# Patient Record
Sex: FEMALE | Race: AMERICAN INDIAN OR ALASKA NATIVE | NOT HISPANIC OR LATINO | Employment: OTHER | URBAN - METROPOLITAN AREA
[De-identification: names, ages, dates, MRNs, and addresses within clinical notes are randomized per-mention and may not be internally consistent; named-entity substitution may affect disease eponyms.]

---

## 2017-01-06 ENCOUNTER — TELEPHONE (OUTPATIENT)
Dept: ENDOSCOPY | Facility: HOSPITAL | Age: 67
End: 2017-01-06

## 2017-01-06 ENCOUNTER — OFFICE VISIT (OUTPATIENT)
Dept: GASTROENTEROLOGY | Facility: CLINIC | Age: 67
End: 2017-01-06
Payer: MEDICARE

## 2017-01-06 VITALS
WEIGHT: 149.94 LBS | BODY MASS INDEX: 24.1 KG/M2 | HEIGHT: 66 IN | HEART RATE: 75 BPM | DIASTOLIC BLOOD PRESSURE: 68 MMHG | SYSTOLIC BLOOD PRESSURE: 129 MMHG

## 2017-01-06 DIAGNOSIS — R13.10 DYSPHAGIA, UNSPECIFIED TYPE: Primary | ICD-10-CM

## 2017-01-06 DIAGNOSIS — K21.9 GASTROESOPHAGEAL REFLUX DISEASE WITHOUT ESOPHAGITIS: ICD-10-CM

## 2017-01-06 DIAGNOSIS — Z51.81 ENCOUNTER FOR MONITORING LONG-TERM PROTON PUMP INHIBITOR THERAPY: ICD-10-CM

## 2017-01-06 DIAGNOSIS — Z79.899 ENCOUNTER FOR MONITORING LONG-TERM PROTON PUMP INHIBITOR THERAPY: ICD-10-CM

## 2017-01-06 PROCEDURE — 99999 PR PBB SHADOW E&M-EST. PATIENT-LVL IV: CPT | Mod: PBBFAC,,, | Performed by: NURSE PRACTITIONER

## 2017-01-06 PROCEDURE — 99214 OFFICE O/P EST MOD 30 MIN: CPT | Mod: S$PBB,,, | Performed by: NURSE PRACTITIONER

## 2017-01-06 PROCEDURE — 99214 OFFICE O/P EST MOD 30 MIN: CPT | Mod: PBBFAC | Performed by: NURSE PRACTITIONER

## 2017-01-06 NOTE — MR AVS SNAPSHOT
[facility] - Gastroenterology  [address]  [place] [address]  Phone: [phone]  Fax: [phone]                  Yamel Shah   2017 3:30 PM   Office Visit    Description:  Female : 1950   Provider:  Jeannette Pringle NP   Department:  [facility]           Reason for Visit     Gastroesophageal Reflux                To Do List           Future Appointments        Provider Department Dept Phone    2017 10:00 AM LAB, APPOINTMENT [place] [facility] [phone]    2017 11:00 AM Joshua Adames DO [facility] Rheumatology [phone]      Goals (5 Years of Data)     None      [facility]     [facility] -  Assistance  Registered nurses in the [facility] provide clinical advisement, health education, appointment booking, and other advisory services.  Call for this free service at [phone].             Medications           Message regarding Medications     Verify the changes and/or additions to your medication regime listed below are the same as discussed with your clinician today.  If any of these changes or additions are incorrect, please notify your healthcare provider.        STOP taking these medications     tramadol (ULTRAM) 50 mg tablet Take 1 tablet (50 mg total) by mouth every 6 (six) hours as needed for Pain.    FLUVIRIN [id number] 45 mcg (15 mcg x 3)/0.5 mL Susp            Verify that the below list of medications is an accurate representation of the medications you are currently taking.  If none reported, the list may be blank. If incorrect, please contact your healthcare provider. Carry this list with you in case of emergency.           Current Medications     ADCIRCA 20 mg Tab TAKE TWO TABLETS (40 MG) BY MOUTH ONCE DAILY. CALL [phone] FOR REFILLS.    albuterol 90 mcg/actuation inhaler Inhale 2 puffs into the lungs every 6 (six) hours as needed for Wheezing.     "gabapentin (NEURONTIN) 300 MG capsule TAKE 1 CAPSULE BY MOUTH THREE TIMES DAILY    hydrocodone-acetaminophen 5-325mg (NORCO) 5-325 mg per tablet Take 1 tablet by mouth 2 (two) times daily as needed for Pain.    meloxicam (MOBIC) 7.5 MG tablet TAKE 1 TABLET BY MOUTH TWICE DAILY WITH FOOD    multivitamin capsule Take 1 capsule by mouth once daily.     nifedipine (ADALAT CC) 90 MG TbSR Take 1 tablet (90 mg total) by mouth once daily.    omeprazole (PRILOSEC) 40 MG capsule TAKE ONE CAPSULE BY MOUTH EVERY MORNING    PATADAY 0.2 % Drop 1 drop once daily.     pravastatin (PRAVACHOL) 20 MG tablet TAKE 1 TABLET BY MOUTH EVERY EVENING    PREVIDENT 5000 BOOSTER PLUS 1.1 % Pste     ranitidine (ZANTAC) 150 MG tablet Take 1 tablet (150 mg total) by mouth nightly.    VITAMIN D2 50,000 unit capsule TAKE ONE CAPSULE BY MOUTH EVERY 7 DAYS    alprazolam (XANAX) 0.5 MG tablet Take 1 tablet (0.5 mg total) by mouth once as needed for Anxiety. Take one 0.5 mg tablet of xanax 1h before the EVLT procedure.  You may take a second tablet right before the procedure; please check with our nurse.    desonide (DESOWEN) 0.05 % cream Apply topically 2 (two) times daily.    ferrous gluconate (FERGON) 325 MG Tab Take 1 tablet (325 mg total) by mouth daily with breakfast.    mupirocin (BACTROBAN) 2 % ointment Apply topically 3 (three) times daily.           Clinical Reference Information           Vital Signs - Last Recorded  Most recent update: 1/6/2017  3:26 PM by Jessica Fabian MA    BP Pulse Ht Wt BMI    129/68 75 5' 6" (1.676 m) 68 kg (149 lb 14.6 oz) 24.2 kg/m2      Blood Pressure          Most Recent Value    BP  129/68      Allergies as of 1/6/2017     Sulfa (Sulfonamide Antibiotics)      Immunizations Administered on Date of Encounter - 1/6/2017     None      "

## 2017-01-06 NOTE — PATIENT INSTRUCTIONS
To aid in effective swallowing:  Take small bites.  Cut foods into tiny pieces before chewing.  Chew foods thoroughly  Wash (drink small amounts of liquids) with liquids between bites.  Take your time while eating.  Do not talk while eating.  Avoid trigger foods.

## 2017-01-13 ENCOUNTER — TELEPHONE (OUTPATIENT)
Dept: RHEUMATOLOGY | Facility: CLINIC | Age: 67
End: 2017-01-13

## 2017-01-13 ENCOUNTER — LAB VISIT (OUTPATIENT)
Dept: LAB | Facility: HOSPITAL | Age: 67
End: 2017-01-13
Attending: INTERNAL MEDICINE
Payer: MEDICARE

## 2017-01-13 DIAGNOSIS — I27.29 PULMONARY HYPERTENSION ASSOCIATED WITH SYSTEMIC DISORDER: Chronic | ICD-10-CM

## 2017-01-13 DIAGNOSIS — R77.8 ELEVATED TOTAL PROTEIN: Primary | ICD-10-CM

## 2017-01-13 DIAGNOSIS — Z51.81 ENCOUNTER FOR MONITORING LONG-TERM PROTON PUMP INHIBITOR THERAPY: ICD-10-CM

## 2017-01-13 DIAGNOSIS — M19.079 OSTEOARTHRITIS OF ANKLE AND FOOT, UNSPECIFIED LATERALITY: ICD-10-CM

## 2017-01-13 DIAGNOSIS — Z79.899 ENCOUNTER FOR MONITORING LONG-TERM PROTON PUMP INHIBITOR THERAPY: ICD-10-CM

## 2017-01-13 LAB
ALBUMIN SERPL BCP-MCNC: 3.5 G/DL
ALBUMIN SERPL BCP-MCNC: 3.5 G/DL
ALP SERPL-CCNC: 114 U/L
ALP SERPL-CCNC: 114 U/L
ALT SERPL W/O P-5'-P-CCNC: 7 U/L
ALT SERPL W/O P-5'-P-CCNC: 7 U/L
ANION GAP SERPL CALC-SCNC: 9 MMOL/L
ANION GAP SERPL CALC-SCNC: 9 MMOL/L
AST SERPL-CCNC: 17 U/L
AST SERPL-CCNC: 17 U/L
BILIRUB SERPL-MCNC: 0.3 MG/DL
BILIRUB SERPL-MCNC: 0.3 MG/DL
BUN SERPL-MCNC: 16 MG/DL
BUN SERPL-MCNC: 16 MG/DL
CALCIUM SERPL-MCNC: 9.2 MG/DL
CALCIUM SERPL-MCNC: 9.2 MG/DL
CHLORIDE SERPL-SCNC: 106 MMOL/L
CHLORIDE SERPL-SCNC: 106 MMOL/L
CO2 SERPL-SCNC: 25 MMOL/L
CO2 SERPL-SCNC: 25 MMOL/L
CREAT SERPL-MCNC: 0.8 MG/DL
CREAT SERPL-MCNC: 0.8 MG/DL
CRP SERPL-MCNC: 5.2 MG/L
ERYTHROCYTE [SEDIMENTATION RATE] IN BLOOD BY WESTERGREN METHOD: 23 MM/HR
EST. GFR  (AFRICAN AMERICAN): >60 ML/MIN/1.73 M^2
EST. GFR  (AFRICAN AMERICAN): >60 ML/MIN/1.73 M^2
EST. GFR  (NON AFRICAN AMERICAN): >60 ML/MIN/1.73 M^2
EST. GFR  (NON AFRICAN AMERICAN): >60 ML/MIN/1.73 M^2
GLUCOSE SERPL-MCNC: 92 MG/DL
GLUCOSE SERPL-MCNC: 92 MG/DL
IGA SERPL-MCNC: 487 MG/DL
IGG SERPL-MCNC: 2625 MG/DL
IGM SERPL-MCNC: 121 MG/DL
POTASSIUM SERPL-SCNC: 3.8 MMOL/L
POTASSIUM SERPL-SCNC: 3.8 MMOL/L
PROT SERPL-MCNC: 8.9 G/DL
PROT SERPL-MCNC: 8.9 G/DL
SODIUM SERPL-SCNC: 140 MMOL/L
SODIUM SERPL-SCNC: 140 MMOL/L
VIT B12 SERPL-MCNC: 522 PG/ML

## 2017-01-13 PROCEDURE — 86334 IMMUNOFIX E-PHORESIS SERUM: CPT

## 2017-01-13 PROCEDURE — 86140 C-REACTIVE PROTEIN: CPT

## 2017-01-13 PROCEDURE — 82607 VITAMIN B-12: CPT

## 2017-01-13 PROCEDURE — 85651 RBC SED RATE NONAUTOMATED: CPT

## 2017-01-13 PROCEDURE — 84165 PROTEIN E-PHORESIS SERUM: CPT

## 2017-01-13 PROCEDURE — 84165 PROTEIN E-PHORESIS SERUM: CPT | Mod: 26,,, | Performed by: PATHOLOGY

## 2017-01-13 PROCEDURE — 80053 COMPREHEN METABOLIC PANEL: CPT

## 2017-01-13 PROCEDURE — 86334 IMMUNOFIX E-PHORESIS SERUM: CPT | Mod: 26,,, | Performed by: PATHOLOGY

## 2017-01-13 PROCEDURE — 36415 COLL VENOUS BLD VENIPUNCTURE: CPT

## 2017-01-13 PROCEDURE — 82784 ASSAY IGA/IGD/IGG/IGM EACH: CPT | Mod: 59

## 2017-01-16 LAB
ALBUMIN SERPL ELPH-MCNC: 3.78 G/DL
ALPHA1 GLOB SERPL ELPH-MCNC: 0.3 G/DL
ALPHA2 GLOB SERPL ELPH-MCNC: 0.71 G/DL
B-GLOBULIN SERPL ELPH-MCNC: 1.14 G/DL
GAMMA GLOB SERPL ELPH-MCNC: 2.57 G/DL
INTERPRETATION SERPL IFE-IMP: NORMAL
PATHOLOGIST INTERPRETATION IFE: NORMAL
PATHOLOGIST INTERPRETATION SPE: NORMAL
PROT SERPL-MCNC: 8.5 G/DL

## 2017-01-17 ENCOUNTER — HOSPITAL ENCOUNTER (OUTPATIENT)
Dept: RADIOLOGY | Facility: HOSPITAL | Age: 67
Discharge: HOME | End: 2017-01-17
Attending: INTERNAL MEDICINE
Payer: MEDICARE

## 2017-01-17 ENCOUNTER — OFFICE VISIT (OUTPATIENT)
Dept: RHEUMATOLOGY | Facility: CLINIC | Age: 67
End: 2017-01-17
Payer: MEDICARE

## 2017-01-17 VITALS
HEART RATE: 72 BPM | SYSTOLIC BLOOD PRESSURE: 110 MMHG | BODY MASS INDEX: 25.16 KG/M2 | WEIGHT: 147.38 LBS | HEIGHT: 64 IN | DIASTOLIC BLOOD PRESSURE: 55 MMHG

## 2017-01-17 DIAGNOSIS — K21.9 GASTROESOPHAGEAL REFLUX DISEASE, ESOPHAGITIS PRESENCE NOT SPECIFIED: ICD-10-CM

## 2017-01-17 DIAGNOSIS — I78.1 TELANGIECTASIA: ICD-10-CM

## 2017-01-17 DIAGNOSIS — I87.2 VENOUS INSUFFICIENCY OF BOTH LOWER EXTREMITIES: ICD-10-CM

## 2017-01-17 DIAGNOSIS — E55.9 VITAMIN D DEFICIENCY: ICD-10-CM

## 2017-01-17 DIAGNOSIS — J84.9 ILD (INTERSTITIAL LUNG DISEASE): ICD-10-CM

## 2017-01-17 DIAGNOSIS — L97.509 ULCER OF OTHER PART OF FOOT: ICD-10-CM

## 2017-01-17 DIAGNOSIS — M34.9 SCLERODERMA: ICD-10-CM

## 2017-01-17 DIAGNOSIS — M34.9 SCLERODERMA: Primary | ICD-10-CM

## 2017-01-17 PROCEDURE — 99999 PR PBB SHADOW E&M-EST. PATIENT-LVL IV: CPT | Mod: PBBFAC,GC,, | Performed by: INTERNAL MEDICINE

## 2017-01-17 PROCEDURE — 73130 X-RAY EXAM OF HAND: CPT | Mod: TC,RT

## 2017-01-17 PROCEDURE — 73130 X-RAY EXAM OF HAND: CPT | Mod: 26,RT,, | Performed by: RADIOLOGY

## 2017-01-17 PROCEDURE — 99214 OFFICE O/P EST MOD 30 MIN: CPT | Mod: PBBFAC,25 | Performed by: INTERNAL MEDICINE

## 2017-01-17 PROCEDURE — 99214 OFFICE O/P EST MOD 30 MIN: CPT | Mod: S$PBB,GC,, | Performed by: INTERNAL MEDICINE

## 2017-01-17 ASSESSMENT — ROUTINE ASSESSMENT OF PATIENT INDEX DATA (RAPID3)
AM STIFFNESS SCORE: 0, NO
MDHAQ FUNCTION SCORE: .9
PAIN SCORE: 3
PATIENT GLOBAL ASSESSMENT SCORE: 3
FATIGUE SCORE: 3
TOTAL RAPID3 SCORE: 3
PSYCHOLOGICAL DISTRESS SCORE: 3.3

## 2017-01-17 NOTE — PROGRESS NOTES
Subjective:       Patient ID: Yamel Shah is a 66 y.o. female.    Chief Complaint: No chief complaint on file.    S:  67 yo F for follow-up for scleroderma, diagnosed 1983- She has been seeing Dr. Ahuja for over 10 years. She is +SCL-70, -RNAP3. She has stable ILD on imaging 9/2015 and had mild exercise induced pulmonary hypertension in 2013 that has resolved on 6/2015 RHC. PFTs 4/2016 show normal DLCO, RV, FEV1. Mild restriction.    She has problems with digital ulcerations and required admission 6/2015 for epoprostenol. She is also on adcirca and nifedipine 90mg daily. She did not qualify for Bosentan. We last saw her lat September and she had worsening of they ulcers on he fit and we sent in for admission for epoprostenol. Her ulcers have improved since and healing but not totally resolved yet. She also saw vascular who will consider EVLT once ulcers have healed.      Her skin and breathing are unchanged.     Labs from 9/22 show worsening microcytic anemia with Hb of 9.5. She has some occasional trouble with food getting stuck in her chest, but denies hematemesis, hemoptysis, dark or bloody stools. She has GERD on EGD from 2014, but denies abdominal pain though she does take prilosec daily and zantac prn. She is scheduled for an EGD 1/26/17    She injured her hand recently and concerned she may have fractured her R 5th digit.     ROS:  Fever: no  Dry eyes no  Red eyes yes  Dry mouth:no  Mouth ulcers:no  Swollen glands:no  Headache:no  Abnormal hair loss:no  Chest pain: no  SOB: no  Difficulty swallowing:no  Cough: no  Heartburn: yes  Constipation:no  Diarrhea:no  Genital ulcers:no  Burning on urination: no  Skin rash: no  Color changes fingers:no  Burning or tingling:no  Bruising:no    Current Outpatient Prescriptions on File Prior to Visit   Medication Sig Dispense Refill    ADCIRCA 20 mg Tab TAKE TWO TABLETS (40 MG) BY MOUTH ONCE DAILY. CALL [phone] FOR REFILLS. 180 tablet 2    multivitamin  capsule Take 1 capsule by mouth once daily.       nifedipine (ADALAT CC) 90 MG TbSR Take 1 tablet (90 mg total) by mouth once daily. 90 tablet 3    omeprazole (PRILOSEC) 40 MG capsule TAKE ONE CAPSULE BY MOUTH EVERY MORNING 90 capsule 3    PATADAY 0.2 % Drop 1 drop once daily.   30    pravastatin (PRAVACHOL) 20 MG tablet TAKE 1 TABLET BY MOUTH EVERY EVENING 90 tablet 3    PREVIDENT 5000 BOOSTER PLUS 1.1 % Pste       VITAMIN D2 50,000 unit capsule TAKE ONE CAPSULE BY MOUTH EVERY 7 DAYS 12 capsule 0    [DISCONTINUED] albuterol 90 mcg/actuation inhaler Inhale 2 puffs into the lungs every 6 (six) hours as needed for Wheezing. 18 g 0    desonide (DESOWEN) 0.05 % cream Apply topically 2 (two) times daily. 1 Tube 0    mupirocin (BACTROBAN) 2 % ointment Apply topically 3 (three) times daily. 30 g 1    [DISCONTINUED] alprazolam (XANAX) 0.5 MG tablet Take 1 tablet (0.5 mg total) by mouth once as needed for Anxiety. Take one 0.5 mg tablet of xanax 1h before the EVLT procedure.  You may take a second tablet right before the procedure; please check with our nurse. 2 tablet 0    [DISCONTINUED] ferrous gluconate (FERGON) 325 MG Tab Take 1 tablet (325 mg total) by mouth daily with breakfast. 90 tablet 0    [DISCONTINUED] gabapentin (NEURONTIN) 300 MG capsule TAKE 1 CAPSULE BY MOUTH THREE TIMES DAILY 270 capsule 0    [DISCONTINUED] hydrocodone-acetaminophen 5-325mg (NORCO) 5-325 mg per tablet Take 1 tablet by mouth 2 (two) times daily as needed for Pain. 60 tablet 0    [DISCONTINUED] meloxicam (MOBIC) 7.5 MG tablet TAKE 1 TABLET BY MOUTH TWICE DAILY WITH FOOD 180 tablet 0    [DISCONTINUED] ranitidine (ZANTAC) 150 MG tablet Take 1 tablet (150 mg total) by mouth nightly. 90 tablet 3     No current facility-administered medications on file prior to visit.        Objective:     Vitals:    01/17/17 1126   BP: (!) 110/55   Pulse: 72     Constitutional: She is oriented to person, place, and time.   HENT:   Head:  Normocephalic and atraumatic.   Mouth/Throat: No oropharyngeal exudate.  + mauskopf face  Eyes: Conjunctivae and EOM are normal. Pupils are equal, round, and reactive to light. Right eye exhibits no discharge. Left eye exhibits no discharge. No scleral icterus.   Neck: Normal range of motion. No tracheal deviation present. No thyromegaly present.   Cardiovascular: Normal rate and regular rhythm.   Pulmonary/Chest: She has She has rales and mild wheezing at bases.   Abdominal: Soft. Bowel sounds are normal. She exhibits no distension and no mass. There is no tenderness. There is no rebound and no guarding.   Neurological: She is oriented to person, place, and time. She displays normal reflexes. Coordination normal.   Skin: Skin is warm. 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 She refuses to unwrap them.  Vasc: no edema    MRS -  28-->33-->19--> 28--> 35--> 28--->28 -->21--> 14.5--<18 today not including the feet.     LABS:  +anti-scleroderma Ab (topoisomerase Ab)  -RNA Polymerase III  +YG  +SSA  Low complement C4         STUDIES:  RHC 6/2015  Exercise:  AOSAT: 97  FICKCI: 3.42  FICKCO: 6.06  PAPRES: 35/12 (21)  PASAT: 73  PVR: 1.98  PWPRES: 12/12 (9)  RAPRES: 11/9 (5)  RVPRES: 31/1, 6    C. SUMMARY/POST-OPERATIVE DIAGNOSIS:    1. RHC demonstrates normal right and left-sided filling pressures.   2. Normal Cardiac output / index.   3. Normal pulmonary pressures.   4. No evidence of exercise induced pulmonary hypertension .    Right heart cath 1/2013  PAPRES: 29/9 (16)  PA:43/15 (26)  1. Normal PAP at rest  2. Normal right and left-sided filling pressures.   3. Normal Cardiac output / index.   4. Mild Exercise induced pulmonary hypertension with a significant decrease in CO/Ci with exercise. Consider ischemic evaluation if pt has not had one recently.    Echo:  7/2016: PPAP  30 (28)    PFTs  4/2016  FVC 70%  RV 89%  DLCO 84%  Mild restriction     9/2015  fvc 66%  TLC 66%  DLCO 71%  6 minute walk not done due to foot ulcerations    4/4/14- EGD  Findings:  One 7 mm polyp with no bleeding was found at the gastroesophageal   junction. This was biopsied with a cold forceps for histology.   Estimated blood loss was minimal.  The Z-line was irregular.  The entire examined stomach was normal.  The examined duodenum was normal.    Path EGD 4/2014  #1 GASTRIC TYPE MUCOSA WITH MILD ACUTE AND CHRONIC INFLAMMATION, AND HYPERPLASTIC  CHANGE.  NO INTESTINAL METAPLASIA OR DYSPLASIA IDENTIFIED.    HRCT 9/2015  Stable-appearing chronic interstitial lung disease consistent with patient's history of scleroderma. No acute abnormality is identified. Given the long-term stability of findings dating back to 2007, follow-up as clinically warranted.    Assessment:       1. Scleroderma (GERD, +telangiectasias, ILD, exercise induced pHTN (resolved 6/2015 RHC), Raynaud's, sclerodactyly with claw hand) Skin score up and down over the last 2 years (range 19-33). Skin, pulmonary all stable today. Foot ulcers improving. She self manages her dressings.     2. Exercise induced Pulmonary hypertension RHC 1/13- resolved on RHC 6/2015. Doesn't qualify for Bosentan. She is on Adcirca 40mg daily.      3. ILD (interstitial lung disease) - PFTS 4/2016 with normal DLCO, FEV1 and RV. 9/2015 HRCT stable ILD from 2007.    4. GERD (gastroesophageal reflux disease) - on PPI once daily with persistent symptoms. Had gastric polyp on EGD 2014, scheduled for EGD 1/26/17   5. Vitamin D insufficiency- on vitamin D weekly   6. Claw hand, unspecified laterality    7.  Osteopenia             Plan:   Continue Nifedipine SR 90mg daily, Continue statin  Continue ASA 81mg daily  Continue daily PPI and start evening zantac prn  Continue adcirca 40mg daily    Follow up with vascular surgery once ulcers heal    Repeat DXA, PFTs     Immunizations  UTD    Check Xray R hand    RTC in 3 months    Joshua Adames DO  Rheumatology fellow  PGY5

## 2017-01-17 NOTE — MR AVS SNAPSHOT
[facility] Rheumatology  [address]  [place] [address]  Phone: [phone]  Fax: [phone]                  Yamel Shah   2017 11:00 AM   Office Visit    Description:  Female : 1950   Provider:  Joshua Adames DO   Department:  [facility] - Rheumatology           Diagnoses this Visit        Comments    Scleroderma    -  Primary     Vitamin D deficiency         Gastroesophageal reflux disease, esophagitis presence not specified         Ulcer of other part of foot         Telangiectasia         Venous insufficiency of both lower extremities         Secondary pulmonary hypertension         ILD (interstitial lung disease)                To Do List           Future Appointments        Provider Department Dept Phone    2017 12:15 PM LAB, SAME DAY [facility] [phone]    2017 12:45 PM [facility] [id number] [address] LIMIT [facility] [phone]    2017 1:00 PM PULMONARY FUNCTION [facility] Pulmonary Lab [phone]    2017 1:15 PM PULMONARY FUNCTION [facility] - Pulmonary Lab [phone]    2017 1:30 PM PULMONARY FUNCTION [facility] Pulmonary Lab [phone]      Your Future Surgeries/Procedures     2017   Surgery with Annamaria Mendoza MD   [facility]    [address] [address] [phone]              Goals (5 Years of Data)     None      Follow-Up and Disposition     Return in about 3 months (around 2017).      [facility]     [facility] Care [facility] -  Assistance  Registered nurses in the [facility] provide clinical advisement, health education, appointment booking, and other advisory services.  Call for this free service at [phone].             Medications           Message regarding Medications     Verify the changes and/or additions to your medication regime listed below are the same as discussed  with your clinician today.  If any of these changes or additions are incorrect, please notify your healthcare provider.        STOP taking these medications     albuterol 90 mcg/actuation inhaler Inhale 2 puffs into the lungs every 6 (six) hours as needed for Wheezing.    alprazolam (XANAX) 0.5 MG tablet Take 1 tablet (0.5 mg total) by mouth once as needed for Anxiety. Take one 0.5 mg tablet of xanax 1h before the EVLT procedure.  You may take a second tablet right before the procedure; please check with our nurse.    ferrous gluconate (FERGON) 325 MG Tab Take 1 tablet (325 mg total) by mouth daily with breakfast.    gabapentin (NEURONTIN) 300 MG capsule TAKE 1 CAPSULE BY MOUTH THREE TIMES DAILY    hydrocodone-acetaminophen 5-325mg (NORCO) 5-325 mg per tablet Take 1 tablet by mouth 2 (two) times daily as needed for Pain.    meloxicam (MOBIC) 7.5 MG tablet TAKE 1 TABLET BY MOUTH TWICE DAILY WITH FOOD    ranitidine (ZANTAC) 150 MG tablet Take 1 tablet (150 mg total) by mouth nightly.           Verify that the below list of medications is an accurate representation of the medications you are currently taking.  If none reported, the list may be blank. If incorrect, please contact your healthcare provider. Carry this list with you in case of emergency.           Current Medications     ADCIRCA 20 mg Tab TAKE TWO TABLETS (40 MG) BY MOUTH ONCE DAILY. CALL [phone] FOR REFILLS.    multivitamin capsule Take 1 capsule by mouth once daily.     nifedipine (ADALAT CC) 90 MG TbSR Take 1 tablet (90 mg total) by mouth once daily.    omeprazole (PRILOSEC) 40 MG capsule TAKE ONE CAPSULE BY MOUTH EVERY MORNING    PATADAY 0.2 % Drop 1 drop once daily.     pravastatin (PRAVACHOL) 20 MG tablet TAKE 1 TABLET BY MOUTH EVERY EVENING    PREVIDENT 5000 BOOSTER PLUS 1.1 % Pste     ranitidine (ZANTAC) 150 MG capsule Take 150 mg by mouth 2 (two) times daily.    VITAMIN D2 50,000 unit capsule TAKE ONE CAPSULE BY MOUTH EVERY 7 DAYS     "desonide (DESOWEN) 0.05 % cream Apply topically 2 (two) times daily.    mupirocin (BACTROBAN) 2 % ointment Apply topically 3 (three) times daily.           Clinical Reference Information           Vital Signs - Last Recorded  Most recent update: 1/17/2017 11:29 AM by Mirian Cabrera MA    BP Pulse Ht Wt BMI    (!) 110/55 72 5' 3.6" (1.615 m) 66.9 kg (147 lb 6.4 oz) 25.62 kg/m2      Blood Pressure          Most Recent Value    BP  (!)  110/55      Allergies as of 1/17/2017     Sulfa (Sulfonamide Antibiotics)      Immunizations Administered on Date of Encounter - 1/17/2017     None      Orders Placed During Today's Visit     Future Labs/Procedures Expected by Expires    DXA Bone Density Spine And Hip_Axial Skeleton  1/17/2017 1/18/2018    X-Ray Hand Complete Right  1/17/2017 1/17/2018    Recurring Lab Work Interval Expires    C-reactive protein   7/17/2017    CBC auto differential   7/17/2017    Comprehensive metabolic panel   7/17/2017    Sedimentation rate, manual   7/17/2017      "

## 2017-01-17 NOTE — PROGRESS NOTES
I  Have personally take the history and examined the patient and agree with fellow's note as stated above. Would also make another attempt to obtain bosentan which is indicated for recurrent/ new digital ulcers. It is approved in [place] for treatment of scleroderma with recurrent digital ulcers. Will send through [facility].

## 2017-01-18 ENCOUNTER — DOCUMENTATION ONLY (OUTPATIENT)
Dept: PHARMACY | Facility: CLINIC | Age: 67
End: 2017-01-18

## 2017-01-18 DIAGNOSIS — I27.29 PULMONARY HYPERTENSION ASSOCIATED WITH SYSTEMIC DISORDER: Primary | Chronic | ICD-10-CM

## 2017-01-18 RX ORDER — BOSENTAN 125 MG/1
TABLET, FILM COATED ORAL
Qty: 60 TABLET | Refills: 5 | Status: ON HOLD | OUTPATIENT
Start: 2017-01-18 | End: 2017-01-26 | Stop reason: SDUPTHER

## 2017-01-18 NOTE — PROGRESS NOTES
[facility] recevied prescription for Tracleer 62.5mg daily for 4 weeks then 125mg twice daily.    Studied dosage:  62.5 mg twice daily for 4 weeks followed by an increase to a maintenance dose of 125 mg twice daily; has been studied in clinical trials for up to 12 weeks (Monica 2004) for the prevention of digital ulcers in systemic sclerosis (off-label use).

## 2017-01-20 ENCOUNTER — TELEPHONE (OUTPATIENT)
Dept: PHARMACY | Facility: CLINIC | Age: 67
End: 2017-01-20

## 2017-01-25 ENCOUNTER — TELEPHONE (OUTPATIENT)
Dept: PHARMACY | Facility: CLINIC | Age: 67
End: 2017-01-25

## 2017-01-25 ENCOUNTER — TELEPHONE (OUTPATIENT)
Dept: TRANSPLANT | Facility: CLINIC | Age: 67
End: 2017-01-25

## 2017-01-25 DIAGNOSIS — I27.29 PULMONARY HYPERTENSION ASSOCIATED WITH SYSTEMIC DISORDER: Chronic | ICD-10-CM

## 2017-01-25 NOTE — TELEPHONE ENCOUNTER
Returned call. Patient states that she has only 2 days left of Adcirca and when she called the pharmacy they told her that a prior authorization was needed and the medication was still listed under Dr. Bowens name.  Notified Tamika at [facility]. Will send sample if needed.

## 2017-01-25 NOTE — TELEPHONE ENCOUNTER
----- Message from Carolyn Lainez sent at 1/25/2017  4:16 PM CST -----  Contact: self   Pt is calling in to get a refill on her meds.     Pt has stated that she has talked to the pharmacy and they need authorization fromt he doctor to fill the meds.     Please contact pt for more information needed at  [phone].    Thanks

## 2017-01-26 ENCOUNTER — HOSPITAL ENCOUNTER (OUTPATIENT)
Facility: HOSPITAL | Age: 67
Discharge: HOME | End: 2017-01-26
Attending: INTERNAL MEDICINE | Admitting: INTERNAL MEDICINE
Payer: MEDICARE

## 2017-01-26 ENCOUNTER — ANESTHESIA (OUTPATIENT)
Dept: ENDOSCOPY | Facility: HOSPITAL | Age: 67
End: 2017-01-26
Payer: MEDICARE

## 2017-01-26 ENCOUNTER — ANESTHESIA EVENT (OUTPATIENT)
Dept: ENDOSCOPY | Facility: HOSPITAL | Age: 67
End: 2017-01-26
Payer: MEDICARE

## 2017-01-26 ENCOUNTER — PATIENT MESSAGE (OUTPATIENT)
Dept: RHEUMATOLOGY | Facility: CLINIC | Age: 67
End: 2017-01-26

## 2017-01-26 VITALS
RESPIRATION RATE: 16 BRPM | OXYGEN SATURATION: 99 % | WEIGHT: 145 LBS | TEMPERATURE: 98 F | HEART RATE: 67 BPM | DIASTOLIC BLOOD PRESSURE: 62 MMHG | SYSTOLIC BLOOD PRESSURE: 119 MMHG | BODY MASS INDEX: 23.3 KG/M2 | HEIGHT: 66 IN

## 2017-01-26 DIAGNOSIS — R13.10 DYSPHAGIA, UNSPECIFIED TYPE: Primary | ICD-10-CM

## 2017-01-26 DIAGNOSIS — R13.10 DYSPHAGIA: ICD-10-CM

## 2017-01-26 DIAGNOSIS — I27.29 PULMONARY HYPERTENSION ASSOCIATED WITH SYSTEMIC DISORDER: Primary | ICD-10-CM

## 2017-01-26 PROCEDURE — D9220A PRA ANESTHESIA: Mod: CRNA,,, | Performed by: NURSE ANESTHETIST, CERTIFIED REGISTERED

## 2017-01-26 PROCEDURE — 27201089 HC SNARE, DISP (ANY): Performed by: INTERNAL MEDICINE

## 2017-01-26 PROCEDURE — 37000009 HC ANESTHESIA EA ADD 15 MINS: Performed by: INTERNAL MEDICINE

## 2017-01-26 PROCEDURE — 25000003 PHARM REV CODE 250: Performed by: INTERNAL MEDICINE

## 2017-01-26 PROCEDURE — 43251 EGD REMOVE LESION SNARE: CPT | Mod: ,,, | Performed by: INTERNAL MEDICINE

## 2017-01-26 PROCEDURE — 43239 EGD BIOPSY SINGLE/MULTIPLE: CPT | Mod: 59,,, | Performed by: INTERNAL MEDICINE

## 2017-01-26 PROCEDURE — D9220A PRA ANESTHESIA: Mod: ANES,,, | Performed by: ANESTHESIOLOGY

## 2017-01-26 PROCEDURE — 88312 SPECIAL STAINS GROUP 1: CPT | Mod: 26,,, | Performed by: PATHOLOGY

## 2017-01-26 PROCEDURE — 27201012 HC FORCEPS, HOT/COLD, DISP: Performed by: INTERNAL MEDICINE

## 2017-01-26 PROCEDURE — 25000003 PHARM REV CODE 250: Performed by: NURSE ANESTHETIST, CERTIFIED REGISTERED

## 2017-01-26 PROCEDURE — 88305 TISSUE EXAM BY PATHOLOGIST: CPT | Performed by: PATHOLOGY

## 2017-01-26 PROCEDURE — 43239 EGD BIOPSY SINGLE/MULTIPLE: CPT | Performed by: INTERNAL MEDICINE

## 2017-01-26 PROCEDURE — 43251 EGD REMOVE LESION SNARE: CPT | Performed by: INTERNAL MEDICINE

## 2017-01-26 PROCEDURE — 88305 TISSUE EXAM BY PATHOLOGIST: CPT | Mod: 26,,, | Performed by: PATHOLOGY

## 2017-01-26 PROCEDURE — 27201042 HC RETRIEVAL NET: Performed by: INTERNAL MEDICINE

## 2017-01-26 PROCEDURE — 63600175 PHARM REV CODE 636 W HCPCS: Performed by: NURSE ANESTHETIST, CERTIFIED REGISTERED

## 2017-01-26 PROCEDURE — 37000008 HC ANESTHESIA 1ST 15 MINUTES: Performed by: INTERNAL MEDICINE

## 2017-01-26 RX ORDER — LIDOCAINE HCL/PF 100 MG/5ML
SYRINGE (ML) INTRAVENOUS
Status: DISCONTINUED | OUTPATIENT
Start: 2017-01-26 | End: 2017-01-26

## 2017-01-26 RX ORDER — PROPOFOL 10 MG/ML
VIAL (ML) INTRAVENOUS CONTINUOUS PRN
Status: DISCONTINUED | OUTPATIENT
Start: 2017-01-26 | End: 2017-01-26

## 2017-01-26 RX ORDER — PROPOFOL 10 MG/ML
VIAL (ML) INTRAVENOUS
Status: DISCONTINUED | OUTPATIENT
Start: 2017-01-26 | End: 2017-01-26

## 2017-01-26 RX ORDER — BOSENTAN 125 MG/1
TABLET, FILM COATED ORAL
Qty: 60 TABLET | Refills: 5 | Status: SHIPPED | OUTPATIENT
Start: 2017-01-26 | End: 2017-02-09 | Stop reason: SDUPTHER

## 2017-01-26 RX ORDER — GLUCAGON 1 MG
KIT INJECTION
Status: DISCONTINUED | OUTPATIENT
Start: 2017-01-26 | End: 2017-01-26

## 2017-01-26 RX ORDER — TADALAFIL 20 MG/1
40 TABLET ORAL DAILY
Qty: 14 TABLET | Refills: 0 | COMMUNITY
Start: 2017-01-26 | End: 2017-02-10 | Stop reason: SDUPTHER

## 2017-01-26 RX ORDER — BOSENTAN 125 MG/1
TABLET, FILM COATED ORAL
Qty: 60 TABLET | Refills: 5 | Status: SHIPPED | OUTPATIENT
Start: 2017-01-26 | End: 2017-01-26 | Stop reason: SDUPTHER

## 2017-01-26 RX ORDER — SODIUM CHLORIDE 9 MG/ML
INJECTION, SOLUTION INTRAVENOUS CONTINUOUS
Status: DISCONTINUED | OUTPATIENT
Start: 2017-01-26 | End: 2017-01-26 | Stop reason: HOSPADM

## 2017-01-26 RX ADMIN — SODIUM CHLORIDE: 0.9 INJECTION, SOLUTION INTRAVENOUS at 10:01

## 2017-01-26 RX ADMIN — LIDOCAINE HYDROCHLORIDE 100 MG: 20 INJECTION, SOLUTION INTRAVENOUS at 10:01

## 2017-01-26 RX ADMIN — SODIUM CHLORIDE: 0.9 INJECTION, SOLUTION INTRAVENOUS at 09:01

## 2017-01-26 RX ADMIN — GLUCAGON HYDROCHLORIDE 0.5 MG: KIT at 10:01

## 2017-01-26 RX ADMIN — PROPOFOL 150 MCG/KG/MIN: 10 INJECTION, EMULSION INTRAVENOUS at 10:01

## 2017-01-26 RX ADMIN — PROPOFOL 50 MG: 10 INJECTION, EMULSION INTRAVENOUS at 10:01

## 2017-01-26 NOTE — IP AVS SNAPSHOT
[facility]  [address]  [facility] [address]  Phone: [phone]           Patient Discharge Instructions     Our goal is to set you up for success. This packet includes information on your condition, medications, and your home care. It will help you to care for yourself so you don't get sicker and need to go back to the hospital.     Please ask your nurse if you have any questions.        There are many details to remember when preparing to leave the hospital. Here is what you will need to do:    1. Take your medicine. If you are prescribed medications, review your Medication List in the following pages. You may have new medications to  at the pharmacy and others that you'll need to stop taking. Review the instructions for how and when to take your medications. Talk with your doctor or nurses if you are unsure of what to do.     2. Go to your follow-up appointments. Specific follow-up information is listed in the following pages. Your may be contacted by a transition nurse or clinical provider about future appointments. Be sure we have all of the phone numbers to reach you, if needed. Please contact your provider's office if you are unable to make an appointment.     3. Watch for warning signs. Your doctor or nurse will give you detailed warning signs to watch for and when to call for assistance. These instructions may also include educational information about your condition. If you experience any of warning signs to your health, call your doctor.               [facility]  Unless otherwise directed by your provider, please contact [facility], our nurse care line that is available for 24/7 assistance.     [phone] (toll-free)    Registered nurses in the [facility] provide clinical advisement, health education, appointment booking, and other advisory services.                    ** Verify the list of medication(s) below is accurate and up  to date. Carry this with you in case of emergency. If your medications have changed, please notify your healthcare provider.             Medication List      ASK your doctor about these medications        Additional Info                      ADCIRCA 20 mg Tab   Quantity:  180 tablet   Refills:  2   Comments:  Refill reqest   Generic drug:  tadalafil (PULMONARY HYPERTENSION)    Instructions:  TAKE TWO TABLETS (40 MG) BY MOUTH ONCE DAILY. CALL [phone] FOR REFILLS.     Begin Date    AM    Noon    PM    Bedtime       bosentan 125 MG Tab   Commonly known as:  TRACLEER   Quantity:  60 tablet   Refills:  5    Instructions:  Take one half tab daily for 4 weeks, then increase to one full tab twice daily thereafter     Begin Date    AM    Noon    PM    Bedtime       desonide 0.05 % cream   Commonly known as:  DESOWEN   Quantity:  1 Tube   Refills:  0    Instructions:  Apply topically 2 (two) times daily.     Begin Date    AM    Noon    PM    Bedtime       multivitamin capsule   Refills:  0   Dose:  1 capsule    Instructions:  Take 1 capsule by mouth once daily.     Begin Date    AM    Noon    PM    Bedtime       mupirocin 2 % ointment   Commonly known as:  BACTROBAN   Quantity:  30 g   Refills:  1    Instructions:  Apply topically 3 (three) times daily.     Begin Date    AM    Noon    PM    Bedtime       nifedipine 90 MG Tbsr   Commonly known as:  ADALAT CC   Quantity:  90 tablet   Refills:  3   Dose:  90 mg    Instructions:  Take 1 tablet (90 mg total) by mouth once daily.     Begin Date    AM    Noon    PM    Bedtime       omeprazole 40 MG capsule   Commonly known as:  PRILOSEC   Quantity:  90 capsule   Refills:  3    Instructions:  TAKE ONE CAPSULE BY MOUTH EVERY MORNING     Begin Date    AM    Noon    PM    Bedtime       PATADAY 0.2 % Drop   Refills:  30   Dose:  1 drop   Generic drug:  olopatadine    Instructions:  1 drop once daily.     Begin Date    AM    Noon    PM    Bedtime       pravastatin 20 MG tablet    Commonly known as:  PRAVACHOL   Quantity:  90 tablet   Refills:  3    Instructions:  TAKE 1 TABLET BY MOUTH EVERY EVENING     Begin Date    AM    Noon    PM    Bedtime       PREVIDENT 5000 BOOSTER PLUS 1.1 % Pste   Refills:  0   Generic drug:  sodium fluoride      Begin Date    AM    Noon    PM    Bedtime       ranitidine 150 MG capsule   Commonly known as:  ZANTAC   Refills:  0   Dose:  150 mg    Instructions:  Take 150 mg by mouth 2 (two) times daily.     Begin Date    AM    Noon    PM    Bedtime       VITAMIN D2 50,000 unit Cap   Quantity:  12 capsule   Refills:  0   Comments:  **Patient requests 90 days supply**   Generic drug:  ergocalciferol    Instructions:  TAKE ONE CAPSULE BY MOUTH EVERY 7 DAYS     Begin Date    AM    Noon    PM    Bedtime                  Please bring to all follow up appointments:    1. A copy of your discharge instructions.  2. All medicines you are currently taking in their original bottles.  3. Identification and insurance card.    Please arrive 15 minutes ahead of scheduled appointment time.    Please call 24 hours in advance if you must reschedule your appointment and/or time.        Your Scheduled Appointments     Apr 13, 2017 10:00 AM CDT   Non-Fasting Lab with LAB, APPOINTMENT [place] [facility] ([facility])    [address] [facility] [address]   [phone]            Apr 18, 2017 11:00 AM CDT   Established Patient Visit with Joshua Adames DO   [facility] - Rheumatology ([facility] )    [address]  [place] [address]   [phone]                  Discharge Instructions         Upper GI Endoscopy     During endoscopy, a long, flexible tube is used to view the inside of your upper GI tract.      Upper GI endoscopy allows your healthcare provider to look directly into the beginning of your gastrointestinal (GI) tract. The esophagus, stomach, and duodenum (the first part of the small intestine) make up the  upper GI tract.   Before the exam  Follow these and any other instructions you are given before your endoscopy. If you dont follow the healthcare providers instructions carefully, the test may need to be canceled or done over:  · Don't eat or drink anything after midnight the night before your exam. If your exam is in the afternoon, drink only clear liquids in the morning. Don't eat or drink anything for 8 hours before the exam. In some cases, you may be able to take medicines with sips of water until 2 hours before the procedure. Speak with your healthcare provider about this.   · Bring your X-rays and any other test results you have.  · Because you will be sedated, arrange for an adult to drive you home after the exam.  · Tell your healthcare provider before the exam if you are taking any medicines or have any medical problems.  The procedure  Here is what to expect:  · You will lie on the endoscopy table. Usually patients lie on the left side.  · You will be monitored and given oxygen.  · Your throat may be numbed with a spray or gargle. You are given medicine through an intravenous (IV) line that will help you relax and remain comfortable. You may be awake or asleep during the procedure.  · The healthcare provider will put the endoscope in your mouth and down your esophagus. It is thinner than most pieces of food that you swallow. It will not affect your breathing. The medicine helps keep you from gagging.  · Air is put into your GI tract to expand it. It can make you burp.  · During the procedure, the healthcare provider can take biopsies (tissue samples), remove abnormalities, such as polyps, or treat abnormalities through a variety of devices placed through the endoscope. You will not feel this.   · The endoscope carries images of your upper GI tract to a video screen. If you are awake, you may be able to look at the images.  · After the procedure is done, you will rest for a time. An adult must drive you  "home.  When to call your healthcare provider  Contact your healthcare provider if you have:  · Black or tarry stools, or blood in your stool  · Fever  · Pain in your belly that does not go away  · Nausea and vomiting, or vomiting blood   © 6371-5091 [employer]. [address] [place] [address]. All rights reserved. This information is not intended as a substitute for professional medical care. Always follow your healthcare professional's instructions.            Admission Information     Date & Time Provider Department CSN    1/26/2017  8:46 AM Annamaria Mendoza MD [facility] [id number]      Care Providers     Provider Role Specialty Primary office phone    Annamaria Mendoza MD Attending Provider Gastroenterology [phone]    Annamaria Mendoza MD Surgeon  Gastroenterology [phone]      Your Vitals Were     BP Pulse Temp Resp Height Weight    104/51 (BP Location: Left arm, Patient Position: Lying, BP Method: Automatic) 68 98 °F (36.7 °C) (Axillary) 16 5' 6" (1.676 m) 65.8 kg (145 lb)    SpO2 BMI             99% 23.4 kg/m2         Recent Lab Values     No lab values to display.      Pending Labs     Order Current Status    Specimen to Pathology - Surgery Collected (01/26/17 1117)      Allergies as of 1/26/2017        Reactions    Sulfa (Sulfonamide Antibiotics)     Other reaction(s): Rash      Advance Directives     An advance directive is a document which, in the event you are no longer able to make decisions for yourself, tells your healthcare team what kind of treatment you do or do not want to receive, or who you would like to make those decisions for you.  If you do not currently have an advance directive, [facility] encourages you to create one.  For more information call:  [phone]), [phone] ([phone]),  or log on to [email or web address].        Language Assistance Services     ATTENTION: Language assistance services are available, free " of charge. Please call [phone].      ATENCIÓN: Si habla español, tiene a hamilton disposición servicios gratuitos de asistencia lingüística. Llame al [phone].     CHÚ Ý: N?u b?n nói Ti?ng Vi?t, có các d?ch v? h? tr? ngôn ng? mi?n phí dành cho b?n. G?i s? [phone].         [facility] complies with applicable Federal civil rights laws and does not discriminate on the basis of race, color, national origin, age, disability, or sex.

## 2017-01-26 NOTE — DISCHARGE INSTRUCTIONS

## 2017-01-26 NOTE — ANESTHESIA PREPROCEDURE EVALUATION
01/26/2017  Yamel Shah is a 66 y.o., female.  Patient Active Problem List   Diagnosis    Scleroderma    Idiopathic neuropathy    Pulmonary hypertension associated with systemic disorder    Venous insufficiency of both lower extremities    Telangiectasia    ILD (interstitial lung disease)    Fecal incontinence    Urinary incontinence    GERD (gastroesophageal reflux disease)    Vitamin D deficiency    Osteopenia    Ulcer of other part of foot    Varicose veins of leg with complications    Varicose veins of lower extremities with ulcer    Cutaneous scleroderma    Cardiomegaly    Secondary pulmonary hypertension    Dysphagia       [facility] Anesthesia Evaluation    I have reviewed the Patient Summary Reports.    I have reviewed the Nursing Notes.   I have reviewed the Medications.     Review of Systems      Physical Exam  General:  Well nourished    Airway/Jaw/Neck:  Airway Findings: Mouth Opening: Normal General Airway Assessment: Adult  Mallampati: II  Improves to II with phonation.  Jaw/Neck Findings:  Limited Ability to Prognath  Neck ROM: Normal ROM     Eyes/Ears/Nose:  Eyes/Ears/Nose Findings:    Dental:  Dental Findings: In tact   Chest/Lungs:  Chest/Lungs Findings: Clear to auscultation, Normal Respiratory Rate     Heart/Vascular:  Heart Findings: Rate: Normal  Rhythm: Regular Rhythm  Sounds: Normal     Abdomen:  Abdomen Findings:  Normal     Musculoskeletal:  Musculoskeletal Findings:    Skin:  Skin Findings:     Mental Status:  Mental Status Findings:  Cooperative, Alert and Oriented         Anesthesia Plan  Type of Anesthesia, risks & benefits discussed:  Anesthesia Type:  general, MAC  Patient's Preference:   Intra-op Monitoring Plan:   Intra-op Monitoring Plan Comments:   Post Op Pain Control Plan:   Post Op Pain Control Plan Comments:   Induction:   IV  Beta Blocker:  Patient  is not currently on a Beta-Blocker (No further documentation required).       Informed Consent: Patient understands risks and agrees with Anesthesia plan.  Questions answered. Anesthesia consent signed with patient.  ASA Score: 2     Day of Surgery Review of History & Physical:    H&P update referred to the surgeon.         Ready For Surgery From Anesthesia Perspective.

## 2017-01-26 NOTE — H&P
Short Stay Endoscopy History and Physical    PCP - Aly Rand MD     Procedure - EGD  ASA - per anesthesia  Mallampati - per anesthesia  History of Anesthesia problems - no  Family history Anesthesia problems -  no   Plan of anesthesia - General    HPI:  This is a 66 y.o. female here for evaluation of dysphagia, gerd, nodule at gej w EGD     ROS:  Constitutional: No fevers, chills, No weight loss  CV: No chest pain  Pulm: No cough, No shortness of breath  Ophtho: No vision changes  GI: see HPI  Derm: No rash    Medical History:  has a past medical history of Abnormal Pap smear; Acid reflux; Allergy; Arthritis; GERD (gastroesophageal reflux disease); History of migraine headaches; Hypertension; Idiopathic neuropathy (7/20/2012); ILD (interstitial lung disease) (11/6/2013); Iron deficiency anemia (3/18/2014); MRSA carrier; Osteopenia; Pulmonary fibrosis; Pulmonary hypertension; Raynaud's disease; Scleroderma, diffuse; Sjogren's syndrome; and Vitamin D deficiency (11/14/2013).    Surgical History:  has a past surgical history that includes Dilation and curettage of uterus; Breast biopsy; Cervical conization w/ laser (1970); and Hysterectomy (1990).    Family History: family history includes Breast cancer in her maternal aunt, mother, and sister; Stroke in her father. There is no history of Melanoma, Colon cancer, Crohn's disease, Stomach cancer, Ulcerative colitis, Rectal cancer, Irritable bowel syndrome, Esophageal cancer, or Celiac disease.. Otherwise no colon cancer, inflammatory bowel disease, or GI malignancies.    Social History:  reports that she has never smoked. She has never used smokeless tobacco. She reports that she drinks alcohol. She reports that she does not use illicit drugs.    Review of patient's allergies indicates:   Allergen Reactions    Sulfa (sulfonamide antibiotics)      Other reaction(s): Rash       Medications:   Prescriptions Prior to Admission   Medication Sig Dispense Refill Last  Dose    ADCIRCA 20 mg Tab TAKE TWO TABLETS (40 MG) BY MOUTH ONCE DAILY. CALL [phone] FOR REFILLS. 180 tablet 2 1/25/2017 at Unknown time    bosentan (TRACLEER) 125 MG Tab Take one half tab daily for 4 weeks, then increase to one full tab twice daily thereafter 60 tablet 5 1/25/2017 at Unknown time    multivitamin capsule Take 1 capsule by mouth once daily.    1/25/2017 at Unknown time    nifedipine (ADALAT CC) 90 MG TbSR Take 1 tablet (90 mg total) by mouth once daily. 90 tablet 3 1/25/2017 at Unknown time    omeprazole (PRILOSEC) 40 MG capsule TAKE ONE CAPSULE BY MOUTH EVERY MORNING 90 capsule 3 1/25/2017 at Unknown time    PATADAY 0.2 % Drop 1 drop once daily.   30 1/25/2017 at Unknown time    pravastatin (PRAVACHOL) 20 MG tablet TAKE 1 TABLET BY MOUTH EVERY EVENING 90 tablet 3 1/25/2017 at Unknown time    PREVIDENT 5000 BOOSTER PLUS 1.1 % Pste    1/25/2017 at Unknown time    ranitidine (ZANTAC) 150 MG capsule Take 150 mg by mouth 2 (two) times daily.   1/25/2017 at Unknown time    VITAMIN D2 50,000 unit capsule TAKE ONE CAPSULE BY MOUTH EVERY 7 DAYS 12 capsule 0 1/25/2017 at Unknown time    desonide (DESOWEN) 0.05 % cream Apply topically 2 (two) times daily. 1 Tube 0 Taking    mupirocin (BACTROBAN) 2 % ointment Apply topically 3 (three) times daily. 30 g 1 Taking       Physical Exam:    Vital Signs:   Vitals:    01/26/17 0935   BP: (!) 141/65   Pulse: 76   Resp: 15   Temp: 97.9 °F (36.6 °C)       General Appearance: Well appearing in no acute distress  Eyes:    No scleral icterus  Lungs: CTA anteriorly  Heart:  Regular rate, S1, S2 normal, no murmurs heard.  Abdomen: Soft, non tender, non distended with normal bowel sounds. No hepatosplenomegaly, ascites, or mass.  Extremities: No edema  Skin: No rash    Labs:  Lab Results   Component Value Date    WBC 3.64 (L) 10/09/2016    HGB 8.9 (L) 10/09/2016    HCT 27.7 (L) 10/09/2016     10/09/2016    CHOL 122 06/01/2016    TRIG 53 06/01/2016     HDL 46 06/01/2016    ALT 7 (L) 01/13/2017    ALT 7 (L) 01/13/2017    AST 17 01/13/2017    AST 17 01/13/2017     01/13/2017     01/13/2017    K 3.8 01/13/2017    K 3.8 01/13/2017     01/13/2017     01/13/2017    CREATININE 0.8 01/13/2017    CREATININE 0.8 01/13/2017    BUN 16 01/13/2017    BUN 16 01/13/2017    CO2 25 01/13/2017    CO2 25 01/13/2017    TSH 1.287 11/25/2015    INR 1.0 06/29/2015       I have explained the risks and benefits of endoscopy procedures to the patient including but not limited to bleeding, perforation, infection, and death.      Annamaria Mendoza MD

## 2017-01-26 NOTE — ANESTHESIA POSTPROCEDURE EVALUATION
"Anesthesia Post Evaluation    Patient: Yamel Shah    Procedure(s) Performed: Procedure(s) (LRB):  ESOPHAGOGASTRODUODENOSCOPY (EGD) (N/A)    Final Anesthesia Type: general  Patient location during evaluation: PACU  Patient participation: Yes- Able to Participate  Level of consciousness: awake and alert and oriented  Pain management: adequate  Airway patency: patent  PONV status at discharge: No PONV  Anesthetic complications: no      Cardiovascular status: blood pressure returned to baseline and hemodynamically stable  Respiratory status: unassisted  Hydration status: euvolemic  Follow-up not needed.        Visit Vitals    /62 (BP Location: Left arm, Patient Position: Lying, BP Method: Automatic)    Pulse 67    Temp 36.7 °C (98 °F) (Axillary)    Resp 16    Ht 5' 6" (1.676 m)    Wt 65.8 kg (145 lb)    SpO2 99%    Breastfeeding No    BMI 23.4 kg/m2       Pain/Lory Score: Pain Assessment Performed: Yes (1/26/2017 11:40 AM)  Presence of Pain: denies (1/26/2017 11:40 AM)  Lory Score: 10 (1/26/2017 11:40 AM)      "

## 2017-01-26 NOTE — TRANSFER OF CARE
"Anesthesia Transfer of Care Note    Patient: Yamel Shah    Procedure(s) Performed: Procedure(s) (LRB):  ESOPHAGOGASTRODUODENOSCOPY (EGD) (N/A)    Patient location: GI    Anesthesia Type: MAC    Transport from OR: Transported from OR on room air with adequate spontaneous ventilation    Post pain: adequate analgesia    Post assessment: no apparent anesthetic complications and tolerated procedure well    Post vital signs: stable    Level of consciousness: awake, alert and oriented    Nausea/Vomiting: no nausea/vomiting    Complications: none          Last vitals:   Visit Vitals    BP (!) 141/65    Pulse 76    Temp 36.6 °C (97.9 °F)    Resp 15    Ht 5' 6" (1.676 m)    Wt 65.8 kg (145 lb)    SpO2 100%    Breastfeeding No    BMI 23.4 kg/m2     "

## 2017-01-26 NOTE — TELEPHONE ENCOUNTER
Dr SEVILLA,    [facility] received the rx for the Tracleer in error.  I routed this to [facility] for you.  Please cosign my order.    Jeff Ayers, PharmD, [facility]  [facility]  [phone]

## 2017-02-02 ENCOUNTER — TELEPHONE (OUTPATIENT)
Dept: ENDOSCOPY | Facility: HOSPITAL | Age: 67
End: 2017-02-02

## 2017-02-09 ENCOUNTER — TELEPHONE (OUTPATIENT)
Dept: RHEUMATOLOGY | Facility: CLINIC | Age: 67
End: 2017-02-09

## 2017-02-09 DIAGNOSIS — I27.29 PULMONARY HYPERTENSION ASSOCIATED WITH SYSTEMIC DISORDER: Primary | ICD-10-CM

## 2017-02-09 DIAGNOSIS — I27.29 PULMONARY HYPERTENSION ASSOCIATED WITH SYSTEMIC DISORDER: ICD-10-CM

## 2017-02-09 RX ORDER — BOSENTAN 125 MG/1
TABLET, FILM COATED ORAL
Qty: 60 TABLET | Refills: 5 | Status: SHIPPED | OUTPATIENT
Start: 2017-02-09 | End: 2017-02-20

## 2017-02-09 RX ORDER — BOSENTAN 125 MG/1
TABLET, FILM COATED ORAL
Qty: 60 TABLET | Refills: 5 | Status: SHIPPED | OUTPATIENT
Start: 2017-02-09 | End: 2017-02-09 | Stop reason: SDUPTHER

## 2017-02-14 RX ORDER — TADALAFIL 20 MG/1
40 TABLET ORAL DAILY
Qty: 14 TABLET | Refills: 11 | COMMUNITY
Start: 2017-02-14 | End: 2018-02-05 | Stop reason: SDUPTHER

## 2017-02-20 ENCOUNTER — HOSPITAL ENCOUNTER (OUTPATIENT)
Dept: RADIOLOGY | Facility: HOSPITAL | Age: 67
Discharge: HOME | End: 2017-02-20
Attending: INTERNAL MEDICINE
Payer: MEDICARE

## 2017-02-20 ENCOUNTER — OFFICE VISIT (OUTPATIENT)
Dept: INTERNAL MEDICINE | Facility: CLINIC | Age: 67
End: 2017-02-20
Payer: MEDICARE

## 2017-02-20 VITALS
WEIGHT: 147.5 LBS | TEMPERATURE: 100 F | SYSTOLIC BLOOD PRESSURE: 128 MMHG | RESPIRATION RATE: 16 BRPM | OXYGEN SATURATION: 96 % | DIASTOLIC BLOOD PRESSURE: 58 MMHG | BODY MASS INDEX: 23.7 KG/M2 | HEART RATE: 91 BPM | HEIGHT: 66 IN

## 2017-02-20 DIAGNOSIS — J18.9 PNEUMONIA OF LEFT LUNG DUE TO INFECTIOUS ORGANISM, UNSPECIFIED PART OF LUNG: Primary | ICD-10-CM

## 2017-02-20 DIAGNOSIS — J84.9 ILD (INTERSTITIAL LUNG DISEASE): ICD-10-CM

## 2017-02-20 DIAGNOSIS — R05.9 COUGH: ICD-10-CM

## 2017-02-20 DIAGNOSIS — R50.9 FEVER, UNSPECIFIED FEVER CAUSE: ICD-10-CM

## 2017-02-20 DIAGNOSIS — M34.9 SCLERODERMA: ICD-10-CM

## 2017-02-20 LAB
FLUAV AG SPEC QL IA: NEGATIVE
FLUBV AG SPEC QL IA: NEGATIVE
SPECIMEN SOURCE: NORMAL

## 2017-02-20 PROCEDURE — 99999 PR PBB SHADOW E&M-EST. PATIENT-LVL IV: CPT | Mod: PBBFAC,,, | Performed by: INTERNAL MEDICINE

## 2017-02-20 PROCEDURE — 71020 XR CHEST PA AND LATERAL: CPT | Mod: TC,PO

## 2017-02-20 PROCEDURE — 99214 OFFICE O/P EST MOD 30 MIN: CPT | Mod: S$PBB,,, | Performed by: INTERNAL MEDICINE

## 2017-02-20 PROCEDURE — 71020 XR CHEST PA AND LATERAL: CPT | Mod: 26,GC,, | Performed by: RADIOLOGY

## 2017-02-20 RX ORDER — CEFTRIAXONE 1 G/1
1 INJECTION, POWDER, FOR SOLUTION INTRAMUSCULAR; INTRAVENOUS
Status: COMPLETED | OUTPATIENT
Start: 2017-02-20 | End: 2017-02-20

## 2017-02-20 RX ORDER — LEVOFLOXACIN 500 MG/1
500 TABLET, FILM COATED ORAL DAILY
Qty: 10 TABLET | Refills: 0 | Status: SHIPPED | OUTPATIENT
Start: 2017-02-20 | End: 2017-03-02

## 2017-02-20 RX ORDER — PROMETHAZINE HYDROCHLORIDE AND CODEINE PHOSPHATE 6.25; 1 MG/5ML; MG/5ML
5 SOLUTION ORAL EVERY 4 HOURS PRN
Qty: 240 ML | Refills: 0 | Status: SHIPPED | OUTPATIENT
Start: 2017-02-20 | End: 2017-03-02

## 2017-02-20 RX ORDER — ALBUTEROL SULFATE 90 UG/1
2 AEROSOL, METERED RESPIRATORY (INHALATION) EVERY 4 HOURS PRN
Qty: 1 EACH | Refills: 1 | Status: SHIPPED | OUTPATIENT
Start: 2017-02-20 | End: 2019-01-31 | Stop reason: SDUPTHER

## 2017-02-20 RX ADMIN — CEFTRIAXONE 1 G: 1 INJECTION, POWDER, FOR SOLUTION INTRAMUSCULAR; INTRAVENOUS at 05:02

## 2017-02-20 NOTE — PROGRESS NOTES
Subjective:       Patient ID: Yamel Shah is a 66 y.o. female.    Chief Complaint: Cough; Headache; Chills; and Sore Throat    HPI   The patient presents with a 3 day history of sore throat, headache and cough.  Symptoms have been progressive.  She now notes that the chest hurts and she is slightly short of breath.  Increased chest congestion is reported.  Cough is productive of purulent sputum.  She is also noting intermittent wheezing.  She has felt chills but no documented fevers noted.  Slight rhinorrhea is noted this afternoon.    The patient has received influenza vaccine.  She also has received Pneumovax.  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    The patient has scleroderma and associated interstitial lung disease.  She is followed closely by rheumatology.    Review of Systems   Constitutional: Positive for chills and fatigue. Negative for activity change, appetite change and unexpected weight change.   HENT: Positive for congestion, rhinorrhea and sore throat.    Respiratory: Positive for cough, shortness of breath and wheezing.    Cardiovascular: Positive for chest pain. Negative for palpitations and leg swelling.   Gastrointestinal: Negative for abdominal distention, abdominal pain, diarrhea, nausea and vomiting.   Genitourinary: Negative for dysuria and flank pain.   Musculoskeletal: Positive for myalgias. Negative for back pain, gait problem and joint swelling.   Skin: Negative for rash.   Neurological: Positive for weakness and headaches.   Psychiatric/Behavioral: Negative for confusion.       Objective:      Physical Exam   Constitutional: She is oriented to person, place, and time. She appears well-developed and well-nourished. No distress.   She has a cough.   HENT:   Head: Normocephalic and atraumatic.   Right Ear: External ear normal.   Left Ear: External ear normal.   Pharynx is mildly injected; no  exudate is noted.   Eyes: Conjunctivae are normal. No scleral icterus.   Neck: Normal range of motion. Neck supple.   Cardiovascular: Normal rate, regular rhythm and normal heart sounds.  Exam reveals no gallop and no friction rub.    No murmur heard.  Pulmonary/Chest: Effort normal. No respiratory distress.   Crackles are noted at the bases.  No wheezes are audible at this time.   Abdominal: Soft. Bowel sounds are normal. She exhibits no distension and no mass. There is no tenderness.   Musculoskeletal:   sclerodactyly is present bilaterally.   Lymphadenopathy:     She has no cervical adenopathy.   Neurological: She is alert and oriented to person, place, and time.   Skin: Skin is warm and dry. No rash noted.   Psychiatric: She has a normal mood and affect. Her behavior is normal.   Nursing note and vitals reviewed.      The rapid flu test for influenza A and B was negative today.    Chest x-ray today shows some interstitial lung changes.  A left mid lung field infiltrate is noted suspicious for pneumonia.  No effusions are noted.  Assessment:       1. Pneumonia of left lung due to infectious organism, unspecified part of lung    2. Fever, unspecified fever cause    3. ILD (interstitial lung disease)    4. Scleroderma    5. Cough        Plan:     Yamel was seen today for cough, headache, chills and sore throat.  The patient has evidence of left midlung pneumonia on chest x-ray.  Rocephin will be administered at this time.  Prescriptions for Levaquin and Ventolin will be given to the patient.  She has been advised to rest at home and to use Tylenol or ibuprofen for relief of pain or fever.  She should increase oral fluid intake to maintain adequate hydration.  She has been advised not to return to work at this time.  She should spend at least one week at home completing antibiotic therapy.  A follow-up chest x-ray is recommended in 3 weeks.  The patient was advised if she develops worsening shortness of breath  or congestion in spite of current therapy she should go to the emergency room for further evaluation for probable hospital admission.    Diagnoses and all orders for this visit:    Pneumonia of left lung due to infectious organism, unspecified part of lung    Fever, unspecified fever cause  -     X-Ray Chest PA And Lateral; Future  -     Influenza antigen Nasopharyngeal Swab    ILD (interstitial lung disease)    Scleroderma    Cough  -     X-Ray Chest PA And Lateral; Future    Other orders  -     cefTRIAXone injection 1 g; Inject 1 g into the muscle one time.  -     levoFLOXacin (LEVAQUIN) 500 MG tablet; Take 1 tablet (500 mg total) by mouth once daily. For infection.  -     albuterol 90 mcg/actuation inhaler; Inhale 2 puffs into the lungs every 4 (four) hours as needed for Wheezing.  -     promethazine-codeine 6.25-10 mg/5 ml (PHENERGAN WITH CODEINE) 6.25-10 mg/5 mL syrup; Take 5 mLs by mouth every 4 (four) hours as needed for Cough.

## 2017-02-22 ENCOUNTER — TELEPHONE (OUTPATIENT)
Dept: GASTROENTEROLOGY | Facility: CLINIC | Age: 67
End: 2017-02-22

## 2017-02-22 NOTE — TELEPHONE ENCOUNTER
Pt needs yearly f/u (next year) for long term PPI use, but can come sooner PRN for GI problems.    Thanks,  Tiff,NP

## 2017-03-09 ENCOUNTER — TELEPHONE (OUTPATIENT)
Dept: TRANSPLANT | Facility: CLINIC | Age: 67
End: 2017-03-09

## 2017-03-16 ENCOUNTER — TELEPHONE (OUTPATIENT)
Dept: TRANSPLANT | Facility: CLINIC | Age: 67
End: 2017-03-16

## 2017-03-17 ENCOUNTER — OFFICE VISIT (OUTPATIENT)
Dept: INTERNAL MEDICINE | Facility: CLINIC | Age: 67
End: 2017-03-17
Payer: MEDICARE

## 2017-03-17 ENCOUNTER — HOSPITAL ENCOUNTER (OUTPATIENT)
Dept: RADIOLOGY | Facility: HOSPITAL | Age: 67
Discharge: HOME | End: 2017-03-17
Attending: INTERNAL MEDICINE
Payer: MEDICARE

## 2017-03-17 VITALS
HEIGHT: 66 IN | BODY MASS INDEX: 23.84 KG/M2 | DIASTOLIC BLOOD PRESSURE: 60 MMHG | SYSTOLIC BLOOD PRESSURE: 140 MMHG | HEART RATE: 81 BPM | WEIGHT: 148.38 LBS | RESPIRATION RATE: 16 BRPM | TEMPERATURE: 98 F

## 2017-03-17 DIAGNOSIS — R19.7 DIARRHEA, UNSPECIFIED TYPE: ICD-10-CM

## 2017-03-17 DIAGNOSIS — J13 PNEUMONIA OF LEFT LOWER LOBE DUE TO STREPTOCOCCUS PNEUMONIAE: ICD-10-CM

## 2017-03-17 DIAGNOSIS — M34.9 SCLERODERMA: ICD-10-CM

## 2017-03-17 DIAGNOSIS — J13 PNEUMONIA OF LEFT LOWER LOBE DUE TO STREPTOCOCCUS PNEUMONIAE: Primary | ICD-10-CM

## 2017-03-17 PROCEDURE — 71020 XR CHEST PA AND LATERAL: CPT | Mod: TC,PO

## 2017-03-17 PROCEDURE — 71020 XR CHEST PA AND LATERAL: CPT | Mod: 26,76,, | Performed by: RADIOLOGY

## 2017-03-17 PROCEDURE — 99213 OFFICE O/P EST LOW 20 MIN: CPT | Mod: S$PBB,,, | Performed by: INTERNAL MEDICINE

## 2017-03-17 PROCEDURE — 99999 PR PBB SHADOW E&M-EST. PATIENT-LVL III: CPT | Mod: PBBFAC,,, | Performed by: INTERNAL MEDICINE

## 2017-03-17 PROCEDURE — 99213 OFFICE O/P EST LOW 20 MIN: CPT | Mod: PBBFAC,PO | Performed by: INTERNAL MEDICINE

## 2017-03-17 RX ORDER — ERGOCALCIFEROL 1.25 MG/1
50000 CAPSULE ORAL
Qty: 12 CAPSULE | Refills: 0 | Status: SHIPPED | OUTPATIENT
Start: 2017-03-17 | End: 2017-06-10 | Stop reason: SDUPTHER

## 2017-03-23 NOTE — PROGRESS NOTES
Subjective:       Patient ID: Yamel Shah is a 66 y.o. female.    Chief Complaint: Follow-up    HPI   The patient presents for follow-up of pneumonia.  Her cough is clearing well.  She is not experiencing any systemic symptoms of chills and fever.  Her appetite is improving although she has had diarrhea for the past 2 weeks.  Review of Systems   Constitutional: Positive for appetite change and fatigue. Negative for chills and fever.   Respiratory: Positive for cough. Negative for shortness of breath and wheezing.    Cardiovascular: Negative for chest pain.   Gastrointestinal: Positive for diarrhea. Negative for abdominal pain, blood in stool, nausea and vomiting.   Neurological: Negative for dizziness and headaches.       Objective:      Physical Exam   Constitutional: She is oriented to person, place, and time. She appears well-developed and well-nourished. No distress.   Neck: Normal range of motion.   Cardiovascular: Normal rate, regular rhythm and normal heart sounds.  Exam reveals no gallop.    No murmur heard.  Pulmonary/Chest: Effort normal and breath sounds normal. She has no wheezes. She has no rales.   Abdominal: Soft. Bowel sounds are normal. She exhibits no distension and no mass. There is no tenderness.   Musculoskeletal: She exhibits no tenderness.   Lymphadenopathy:     She has no cervical adenopathy.   Neurological: She is alert and oriented to person, place, and time.   Skin: Skin is warm and dry. No rash noted.   Nursing note and vitals reviewed.      Chest x-ray today showed nearly complete clearing of the left lingula infiltrate.  Assessment:       1. Pneumonia of left lower lobe due to Streptococcus pneumoniae    2. Scleroderma    3. Diarrhea, unspecified type        Plan:       Yamel was seen today for follow-up.  Stool will be sent for testing for C. difficile and other pathogens.  Current medications be continued.  The patient is to return to clinic in 4 months.    Diagnoses and  all orders for this visit:    Pneumonia of left lower lobe due to Streptococcus pneumoniae  -     X-Ray Chest PA And Lateral; Future    Scleroderma    Diarrhea, unspecified type  -     CULTURE, STOOL; Future  -     WBC, Stool; Future  -     Stool Exam-Ova,Cysts,Parasites; Future  -     CLOSTRIDIUM DIFFICILE; Future    Other orders  -     ergocalciferol (VITAMIN D2) 50,000 unit Cap; Take 1 capsule (50,000 Units total) by mouth every 7 days.

## 2017-03-31 RX ORDER — OMEPRAZOLE 40 MG/1
CAPSULE, DELAYED RELEASE ORAL
Qty: 90 CAPSULE | Refills: 3 | Status: SHIPPED | OUTPATIENT
Start: 2017-03-31 | End: 2018-03-01 | Stop reason: SDUPTHER

## 2017-06-02 ENCOUNTER — TELEPHONE (OUTPATIENT)
Dept: WOUND CARE | Facility: CLINIC | Age: 67
End: 2017-06-02

## 2017-06-02 ENCOUNTER — OFFICE VISIT (OUTPATIENT)
Dept: WOUND CARE | Facility: CLINIC | Age: 67
End: 2017-06-02
Payer: MEDICARE

## 2017-06-02 VITALS
BODY MASS INDEX: 24.12 KG/M2 | DIASTOLIC BLOOD PRESSURE: 59 MMHG | WEIGHT: 153.69 LBS | SYSTOLIC BLOOD PRESSURE: 134 MMHG | TEMPERATURE: 98 F | HEART RATE: 85 BPM | HEIGHT: 67 IN

## 2017-06-02 DIAGNOSIS — M34.89 CUTANEOUS SCLERODERMA: ICD-10-CM

## 2017-06-02 DIAGNOSIS — L97.519 SKIN ULCERS OF BOTH FEET: Primary | ICD-10-CM

## 2017-06-02 DIAGNOSIS — L97.529 SKIN ULCERS OF BOTH FEET: Primary | ICD-10-CM

## 2017-06-02 PROCEDURE — 99999 PR PBB SHADOW E&M-EST. PATIENT-LVL V: CPT | Mod: PBBFAC,,, | Performed by: NURSE PRACTITIONER

## 2017-06-02 PROCEDURE — 99215 OFFICE O/P EST HI 40 MIN: CPT | Mod: PBBFAC | Performed by: NURSE PRACTITIONER

## 2017-06-02 PROCEDURE — 99213 OFFICE O/P EST LOW 20 MIN: CPT | Mod: S$PBB,,, | Performed by: NURSE PRACTITIONER

## 2017-06-02 NOTE — PATIENT INSTRUCTIONS
You may shower using a mild soap such as Dove.  Irrigate the wound with lukewarm water for 5 minutes and dry thoroughly.  Medihoney gel to bilateral foot ulcers and cover with aquacel extra.  Hydrofiber and gauze in between toes.  Cover with gauze and secure with roll gauze.  Change dressing daily.

## 2017-06-02 NOTE — TELEPHONE ENCOUNTER
----- Message from Estee Hernadez sent at 6/1/2017  3:31 PM CDT -----  Contact: self  Yamel States that they need a call returned by a nurse in reference to her having ulcers in her feet and they are very painful and burning  .Please call Yamel  @  [phone]                                            Thanks :)

## 2017-06-02 NOTE — PROGRESS NOTES
Subjective:       Patient ID: Yamel Shah is a 66 y.o. female.    Chief Complaint: Wound Check    Wound Check     This patient is well-known to my service.  She was last seen in this clinic in July 2015 when she was treated for ulcers to both feet.  She did not follow up in wound care following this last visit.  She presents today for reevaluation and management of these same ulcers to both feet.  The ulcers on the left foot started in January 2015 and the one on the right foot began the end of March 2015.  She was using iodosorb gel on the wounds daily and felt they were getting better until recently when they started draining a lot more fluid.  In the past she has had pulsed Veletri infusions which have helped temporarily.  With her history of scleroderma this could possibly a vasculitis.  She is afebrile and denies swelling or redness.  She reports a large amount of serous exudate.  Her wound healing is complicated by scleroderma and venous insufficiency.  Her pain level is 7/10.  Review of Systems   Constitutional: Negative for chills, diaphoresis and fever.   HENT: Positive for tinnitus (left hear). Negative for hearing loss, postnasal drip, rhinorrhea, sinus pressure, sneezing, sore throat and trouble swallowing.    Eyes: Negative for visual disturbance.   Respiratory: Negative for cough, shortness of breath and wheezing.    Cardiovascular: Negative for chest pain, palpitations and leg swelling.   Gastrointestinal: Negative for constipation, diarrhea, nausea and vomiting.   Genitourinary: Negative for difficulty urinating, dysuria and hematuria.        Urinary incontinence   Musculoskeletal: Negative for arthralgias, back pain and joint swelling.   Skin: Positive for wound.   Neurological: Negative for dizziness, light-headedness and headaches.   Hematological: Bruises/bleeds easily.   Psychiatric/Behavioral: Positive for sleep disturbance. Negative for decreased concentration. The patient is not  nervous/anxious.        Objective:      Physical Exam   Constitutional: She is oriented to person, place, and time. She appears well-developed and well-nourished. No distress.   HENT:   Head: Normocephalic and atraumatic.   Neck: Normal range of motion.   Cardiovascular: Intact distal pulses.    Pulmonary/Chest: Effort normal. No respiratory distress.   Musculoskeletal: Normal range of motion. She exhibits no edema or tenderness.        Feet:    Neurological: She is alert and oriented to person, place, and time.   Skin: Skin is warm and dry. No rash noted. She is not diaphoretic. No cyanosis or erythema. Nails show no clubbing.   Psychiatric: She has a normal mood and affect. Her behavior is normal. Judgment and thought content normal.   Nursing note and vitals reviewed.      Assessment:       1. Skin ulcers of both feet    2. Cutaneous scleroderma        Plan:           Refer to dermatology for biopsy to rule out vasculitis.  Medihoney gel to bilateral foot ulcers and cover with hydrofiber.  Hydrofiber and gauze in between toes.  Cover with gauze and secure with roll gauze.  Return to clinic in 2-3 weeks.    Right foot    Left foot    Bilateral plantar feet

## 2017-06-12 RX ORDER — ERGOCALCIFEROL 1.25 MG/1
CAPSULE ORAL
Qty: 12 CAPSULE | Refills: 0 | Status: SHIPPED | OUTPATIENT
Start: 2017-06-12 | End: 2017-09-06 | Stop reason: SDUPTHER

## 2017-06-26 ENCOUNTER — TELEPHONE (OUTPATIENT)
Dept: RADIOLOGY | Facility: HOSPITAL | Age: 67
End: 2017-06-26

## 2017-06-27 ENCOUNTER — HOSPITAL ENCOUNTER (OUTPATIENT)
Dept: RADIOLOGY | Facility: HOSPITAL | Age: 67
Discharge: HOME | End: 2017-06-27
Attending: INTERNAL MEDICINE
Payer: MEDICARE

## 2017-06-27 ENCOUNTER — OFFICE VISIT (OUTPATIENT)
Dept: TRANSPLANT | Facility: CLINIC | Age: 67
End: 2017-06-27
Payer: MEDICARE

## 2017-06-27 VITALS
HEART RATE: 74 BPM | DIASTOLIC BLOOD PRESSURE: 53 MMHG | WEIGHT: 151.69 LBS | BODY MASS INDEX: 24.38 KG/M2 | OXYGEN SATURATION: 100 % | HEIGHT: 66 IN | SYSTOLIC BLOOD PRESSURE: 135 MMHG

## 2017-06-27 DIAGNOSIS — I27.29 PULMONARY HYPERTENSION ASSOCIATED WITH SYSTEMIC DISORDER: Chronic | ICD-10-CM

## 2017-06-27 DIAGNOSIS — J84.9 ILD (INTERSTITIAL LUNG DISEASE): ICD-10-CM

## 2017-06-27 DIAGNOSIS — I27.29 PULMONARY HYPERTENSION ASSOCIATED WITH SYSTEMIC DISORDER: Primary | Chronic | ICD-10-CM

## 2017-06-27 DIAGNOSIS — M34.9 SCLERODERMA: ICD-10-CM

## 2017-06-27 DIAGNOSIS — I78.1 TELANGIECTASIA: ICD-10-CM

## 2017-06-27 PROCEDURE — 78582 LUNG VENTILAT&PERFUS IMAGING: CPT | Mod: 26,,, | Performed by: RADIOLOGY

## 2017-06-27 PROCEDURE — 99214 OFFICE O/P EST MOD 30 MIN: CPT | Mod: S$PBB,,, | Performed by: PHYSICIAN ASSISTANT

## 2017-06-27 PROCEDURE — 99999 PR PBB SHADOW E&M-EST. PATIENT-LVL IV: CPT | Mod: PBBFAC,,, | Performed by: PHYSICIAN ASSISTANT

## 2017-06-27 PROCEDURE — 99214 OFFICE O/P EST MOD 30 MIN: CPT | Mod: PBBFAC,25 | Performed by: PHYSICIAN ASSISTANT

## 2017-06-27 NOTE — PROGRESS NOTES
Subjective:    Patient ID:  Yamel Shah is a 66 y.o. female who presents for follow-up of Pulmonary Hypertension (1yr visit...Pt unable to do the 6mwt due to foot issues...c/o getting tired easily)    HPI   very pleasant 65 year old woman with an underlying history of scleroderma, diagnosed in 1983, previously followed by Dr Boudreaux for pulmonary hypertension who prsents to establish care with me-  She was started on Revatio 2013, but had difficulty with affording it, so she was switched to Adcirca 40mg daily. She was last seen by Dr kong 2/14 prior to his leaving for [employer]- she had planned to follow him, but decided to stay here on the advice on Dr Ahuja.  Since last visit patient states she is doing fine. No new complaints. Still having issues with bilateral foot ulcers, she sees wound care regularly and is having biopsy in august to r/o vasculitis. States her SOB is at baseline, and her ulcers (not SOB) prevent her from being active. Last echo one year ago, will order repeat for surveillance and schedule patient at her earliest convenience. Still no 6 minute walk due to foot ulcers/pain. Currently on Adcirca 20 QD. BP controlled, O2 sats on room air 100%. V/Q scan today low probability for PE.     TTE 7/22/16  1 - Normal left ventricular systolic function (EF 60-65%).   2 - Mild left atrial enlargement.   3 - Normal right ventricular systolic function .   4 - The estimated PA systolic pressure is 30 mmHg.     PFTs 9/16  Normal airflow, mild restriction, normal DLCO    CT chest 9/15  Stable-appearing chronic interstitial lung disease consistent with patient's history of scleroderma. No acute abnormality is identified.  Given the long-term stability of findings dating back to 2007, follow-up as clinically warranted.    Dependent secretions throughout the esophagus to suggest dysmotility    RHC 6/29/ 15  AOSAT: 97  FICKCI: 3.42  FICKCO: 6.06  PAPRES: 35/12 (21)  PASAT: 73  PVR: 1.98  PWPRES: 12/12  "(9)  RAPRES: 11/9 (5)  RVPRES: 31/1, 6     Review of Systems   Constitution: Negative for chills, fever, malaise/fatigue and weight gain.   HENT: Negative.    Eyes: Negative.    Cardiovascular: Negative for chest pain, dyspnea on exertion, leg swelling, near-syncope, orthopnea, palpitations, paroxysmal nocturnal dyspnea and syncope.   Respiratory: Negative for cough and shortness of breath.    Endocrine: Negative.    Skin: Negative.    Musculoskeletal: Negative.         Foot pain due to ulcers   Gastrointestinal: Negative for bloating, abdominal pain and change in bowel habit.   Neurological: Negative for dizziness and light-headedness.   Psychiatric/Behavioral: Negative for depression.     Objective:   BP (!) 135/53   Pulse 74   Ht 5' 6" (1.676 m)   Wt 68.8 kg (151 lb 10.8 oz)   SpO2 100%   BMI 24.48 kg/m²      Physical Exam   Constitutional: She is oriented to person, place, and time. She appears well-developed and well-nourished.   LE in orthotic ankle boots due to ulcers   HENT:   Head: Normocephalic and atraumatic.   Eyes: Right eye exhibits no discharge. Left eye exhibits no discharge.   Neck: Neck supple. No JVD present. No thyromegaly present.   Cardiovascular: Normal rate and regular rhythm.  Exam reveals no gallop and no friction rub.    No murmur heard.       Pulmonary/Chest: Effort normal and breath sounds normal. No respiratory distress. She has no wheezes. She has no rales.   Abdominal: Soft. Bowel sounds are normal. She exhibits no distension. There is no tenderness.   Musculoskeletal: Normal range of motion. She exhibits no edema or tenderness.   Neurological: She is alert and oriented to person, place, and time. No cranial nerve deficit. Coordination normal.   Skin: Skin is warm and dry. No rash noted.   Severe tightening of skin hands   Psychiatric: She has a normal mood and affect. Judgment and thought content normal.         Lab Results   Component Value Date    BNP 69 07/22/2016     " 01/13/2017     01/13/2017    K 3.8 01/13/2017    K 3.8 01/13/2017    MG 2.1 10/09/2016     01/13/2017     01/13/2017    CO2 25 01/13/2017    CO2 25 01/13/2017    BUN 16 01/13/2017    BUN 16 01/13/2017    CREATININE 0.8 01/13/2017    CREATININE 0.8 01/13/2017    GLU 92 01/13/2017    GLU 92 01/13/2017    AST 17 01/13/2017    AST 17 01/13/2017    ALT 7 (L) 01/13/2017    ALT 7 (L) 01/13/2017    ALBUMIN 3.5 01/13/2017    ALBUMIN 3.5 01/13/2017    PROT 8.9 (H) 01/13/2017    PROT 8.9 (H) 01/13/2017    BILITOT 0.3 01/13/2017    BILITOT 0.3 01/13/2017    CHOL 122 06/01/2016    HDL 46 06/01/2016    LDLCALC 65.4 06/01/2016    TRIG 53 06/01/2016       Magnesium   Date Value Ref Range Status   10/09/2016 2.1 1.6 - 2.6 mg/dL Final       Lab Results   Component Value Date    WBC 3.64 (L) 10/09/2016    HGB 8.9 (L) 10/09/2016    HCT 27.7 (L) 10/09/2016    MCV 81 (L) 10/09/2016     10/09/2016       Lab Results   Component Value Date    INR 1.0 06/29/2015    INR 1.0 01/21/2013    INR 1.0 09/29/2007     BNP   Date Value Ref Range Status   07/22/2016 69 0 - 99 pg/mL Final     Comment:     Values of less than 100 pg/ml are consistent with non-CHF populations.   09/01/2015 46 0 - 99 pg/mL Final     Comment:     Values of less than 100 pg/ml are consistent with non-CHF populations.   06/18/2013 37 0 - 99 pg/mL Final     Comment:     Values of less than 100 pg/ml are consistent with non-CHF populations.     LD   Date Value Ref Range Status   09/30/2007 139 110 - 260 U/L Final       Assessment:       1. Pulmonary hypertension associated with systemic disorder- normal PAP on RHC earlier this year, with normal RV function by echo, BNP normal, euvolemic on exam- no 6mw due to foot ulcers   2. Scleroderma    3. Telangiectasia    4. ILD (interstitial lung disease) - PFts also surprisingly good, chest imaging stable     Plan:   - VQ scan today low probability for PE  - Continue Adcirca 20, echo ordered today for routine  monitoring  - Patient can bee seen in 6 months, will call her with echo results. Continue to see wound care, will have to delay 6 minute walk until patients foot pain is controlled.

## 2017-07-19 ENCOUNTER — OFFICE VISIT (OUTPATIENT)
Dept: WOUND CARE | Facility: CLINIC | Age: 67
End: 2017-07-19
Payer: MEDICARE

## 2017-07-19 VITALS
DIASTOLIC BLOOD PRESSURE: 60 MMHG | TEMPERATURE: 98 F | HEIGHT: 66 IN | WEIGHT: 149.13 LBS | BODY MASS INDEX: 23.97 KG/M2 | SYSTOLIC BLOOD PRESSURE: 135 MMHG | HEART RATE: 77 BPM

## 2017-07-19 DIAGNOSIS — M34.9 SCLERODERMA: ICD-10-CM

## 2017-07-19 DIAGNOSIS — L97.529 SKIN ULCERS OF BOTH FEET: Primary | ICD-10-CM

## 2017-07-19 DIAGNOSIS — L97.519 SKIN ULCERS OF BOTH FEET: Primary | ICD-10-CM

## 2017-07-19 PROCEDURE — 99212 OFFICE O/P EST SF 10 MIN: CPT | Mod: S$PBB,,, | Performed by: NURSE PRACTITIONER

## 2017-07-19 PROCEDURE — 99999 PR PBB SHADOW E&M-EST. PATIENT-LVL IV: CPT | Mod: PBBFAC,,, | Performed by: NURSE PRACTITIONER

## 2017-07-19 PROCEDURE — 99214 OFFICE O/P EST MOD 30 MIN: CPT | Mod: PBBFAC | Performed by: NURSE PRACTITIONER

## 2017-07-19 NOTE — PATIENT INSTRUCTIONS
Wash your feet with a mild soap such as dove and irrigate the wounds with lukewarm water for 5 minutes.  Dry thoroughly.  Medihoney gel to bilateral foot ulcers and cover with hydrofiber.  Hydrofiber and gauze in between toes.  Cover with gauze and secure with roll gauze.

## 2017-07-19 NOTE — PROGRESS NOTES
Subjective:       Patient ID: Yamel Shah is a 66 y.o. female.    Chief Complaint: Wound Check    Wound Check     This patient is seen today for reevaluation of ulcers to both feet.  The ulcers on the left foot started in January 2015 and the one on the right foot began the end of March 2015.  She is using medihoney gel daily on the wounds and covering with a hydrofiber rope dressing.  In the past she has had pulsed Veletri infusions which have helped temporarily.  She is afebrile.  She denies increased swelling, redness or purulent drainage.  Her wound healing is complicated by scleroderma and venous insufficiency.  Her pain level is 6/10.  Review of Systems   Constitutional: Negative for chills, diaphoresis and fever.   HENT: Positive for tinnitus (left hear). Negative for hearing loss, postnasal drip, rhinorrhea, sinus pressure, sneezing, sore throat and trouble swallowing.    Eyes: Negative for visual disturbance.   Respiratory: Negative for cough, shortness of breath and wheezing.    Cardiovascular: Negative for chest pain, palpitations and leg swelling.   Gastrointestinal: Negative for constipation, diarrhea, nausea and vomiting.   Genitourinary: Negative for difficulty urinating, dysuria and hematuria.        Urinary incontinence   Musculoskeletal: Negative for arthralgias, back pain and joint swelling.   Skin: Positive for wound.   Neurological: Negative for dizziness, light-headedness and headaches.   Hematological: Bruises/bleeds easily.   Psychiatric/Behavioral: Positive for sleep disturbance. Negative for decreased concentration. The patient is not nervous/anxious.        Objective:      Physical Exam   Constitutional: She is oriented to person, place, and time. She appears well-developed and well-nourished. No distress.   HENT:   Head: Normocephalic and atraumatic.   Neck: Normal range of motion.   Cardiovascular: Intact distal pulses.    Pulmonary/Chest: Effort normal. No respiratory distress.    Musculoskeletal: Normal range of motion. She exhibits no edema or tenderness.        Feet:    Neurological: She is alert and oriented to person, place, and time.   Skin: Skin is warm and dry. No rash noted. She is not diaphoretic. No cyanosis or erythema. Nails show no clubbing.   Psychiatric: She has a normal mood and affect. Her behavior is normal. Judgment and thought content normal.   Nursing note and vitals reviewed.      Assessment:       1. Skin ulcers of both feet    2. Scleroderma        Plan:           Medihoney gel to bilateral foot ulcers and cover with hydrofiber.  Hydrofiber and gauze in between toes.  Cover with gauze and secure with roll gauze.  Return to clinic in 2-3 weeks.    Bilateral dorsal feet        Bilateral plantar feet

## 2017-08-09 ENCOUNTER — CLINICAL SUPPORT (OUTPATIENT)
Dept: CARDIOLOGY | Facility: CLINIC | Age: 67
End: 2017-08-09
Payer: MEDICARE

## 2017-08-09 ENCOUNTER — INITIAL CONSULT (OUTPATIENT)
Dept: DERMATOLOGY | Facility: CLINIC | Age: 67
End: 2017-08-09
Payer: MEDICARE

## 2017-08-09 ENCOUNTER — OFFICE VISIT (OUTPATIENT)
Dept: WOUND CARE | Facility: CLINIC | Age: 67
End: 2017-08-09
Payer: MEDICARE

## 2017-08-09 VITALS
DIASTOLIC BLOOD PRESSURE: 48 MMHG | HEIGHT: 66 IN | WEIGHT: 144.31 LBS | HEART RATE: 89 BPM | SYSTOLIC BLOOD PRESSURE: 111 MMHG | TEMPERATURE: 98 F | BODY MASS INDEX: 23.19 KG/M2

## 2017-08-09 VITALS — BODY MASS INDEX: 23.24 KG/M2 | WEIGHT: 144 LBS

## 2017-08-09 DIAGNOSIS — I27.29 PULMONARY HYPERTENSION ASSOCIATED WITH SYSTEMIC DISORDER: Chronic | ICD-10-CM

## 2017-08-09 DIAGNOSIS — L97.529 SKIN ULCERS OF BOTH FEET: Primary | ICD-10-CM

## 2017-08-09 DIAGNOSIS — M34.9 SCLERODERMA: Primary | ICD-10-CM

## 2017-08-09 DIAGNOSIS — M34.89 CUTANEOUS SCLERODERMA: ICD-10-CM

## 2017-08-09 DIAGNOSIS — L08.0 PYODERMA: ICD-10-CM

## 2017-08-09 DIAGNOSIS — L97.519 SKIN ULCERS OF BOTH FEET: Primary | ICD-10-CM

## 2017-08-09 LAB
DIASTOLIC DYSFUNCTION: NO
ESTIMATED PA SYSTOLIC PRESSURE: 27.8
MITRAL VALVE REGURGITATION: NORMAL
RETIRED EF AND QEF - SEE NOTES: 65 (ref 55–65)
TRICUSPID VALVE REGURGITATION: NORMAL

## 2017-08-09 PROCEDURE — 99999 PR PBB SHADOW E&M-EST. PATIENT-LVL IV: CPT | Mod: PBBFAC,,, | Performed by: NURSE PRACTITIONER

## 2017-08-09 PROCEDURE — 99999 PR PBB SHADOW E&M-EST. PATIENT-LVL III: CPT | Mod: PBBFAC,,, | Performed by: DERMATOLOGY

## 2017-08-09 PROCEDURE — 11100 PR BIOPSY OF SKIN LESION: CPT | Mod: S$PBB,,, | Performed by: DERMATOLOGY

## 2017-08-09 PROCEDURE — 3008F BODY MASS INDEX DOCD: CPT | Mod: ,,, | Performed by: DERMATOLOGY

## 2017-08-09 PROCEDURE — 11100 PR BIOPSY OF SKIN LESION: CPT | Mod: PBBFAC,PO | Performed by: DERMATOLOGY

## 2017-08-09 PROCEDURE — 3008F BODY MASS INDEX DOCD: CPT | Mod: ,,, | Performed by: NURSE PRACTITIONER

## 2017-08-09 PROCEDURE — 88312 SPECIAL STAINS GROUP 1: CPT | Mod: 26,,, | Performed by: PATHOLOGY

## 2017-08-09 PROCEDURE — 88305 TISSUE EXAM BY PATHOLOGIST: CPT | Performed by: PATHOLOGY

## 2017-08-09 PROCEDURE — 93306 TTE W/DOPPLER COMPLETE: CPT | Mod: PBBFAC,PO | Performed by: INTERNAL MEDICINE

## 2017-08-09 PROCEDURE — 99212 OFFICE O/P EST SF 10 MIN: CPT | Mod: S$PBB,,, | Performed by: NURSE PRACTITIONER

## 2017-08-09 PROCEDURE — 99213 OFFICE O/P EST LOW 20 MIN: CPT | Mod: 25,S$PBB,, | Performed by: DERMATOLOGY

## 2017-08-09 NOTE — PROGRESS NOTES
Subjective:       Patient ID: Yamel Shah is a 66 y.o. female.    Chief Complaint: Wound Check    Wound Check     This patient is seen today for reevaluation of ulcers to both feet.  The ulcers on the left foot started in January 2015 and the one on the right foot began the end of March 2015.  She is using medihoney gel daily on the wounds and covering with a hydrofiber rope dressing.  She is unable to tolerate Santyl due to excessive burning.  The left third toe wound is healed.  However, she has a new wound to the left dorsal foot and right lateral ankle.  In the past she has had pulsed Veletri infusions which have helped temporarily.  She is scheduled to see dermatology today for a biopsy to rule out vasculitis.  She is afebrile.  She denies increased swelling, redness or purulent drainage.  Her wound healing is complicated by scleroderma and venous insufficiency.  Her pain level is 8/10.  Review of Systems   Constitutional: Negative for chills, diaphoresis and fever.   HENT: Positive for tinnitus (left hear). Negative for hearing loss, postnasal drip, rhinorrhea, sinus pressure, sneezing, sore throat and trouble swallowing.    Eyes: Negative for visual disturbance.   Respiratory: Negative for cough, shortness of breath and wheezing.    Cardiovascular: Negative for chest pain, palpitations and leg swelling.   Gastrointestinal: Negative for constipation, diarrhea, nausea and vomiting.   Genitourinary: Negative for difficulty urinating, dysuria and hematuria.        Urinary incontinence   Musculoskeletal: Negative for arthralgias, back pain and joint swelling.   Skin: Positive for wound.   Neurological: Negative for dizziness, light-headedness and headaches.   Hematological: Bruises/bleeds easily.   Psychiatric/Behavioral: Positive for sleep disturbance. Negative for decreased concentration. The patient is not nervous/anxious.        Objective:      Physical Exam   Constitutional: She is oriented to  person, place, and time. She appears well-developed and well-nourished. No distress.   HENT:   Head: Normocephalic and atraumatic.   Neck: Normal range of motion.   Cardiovascular: Intact distal pulses.    Pulmonary/Chest: Effort normal. No respiratory distress.   Musculoskeletal: Normal range of motion. She exhibits no edema or tenderness.        Feet:    Neurological: She is alert and oriented to person, place, and time.   Skin: Skin is warm and dry. No rash noted. She is not diaphoretic. No cyanosis or erythema. Nails show no clubbing.   Psychiatric: She has a normal mood and affect. Her behavior is normal. Judgment and thought content normal.   Nursing note and vitals reviewed.      Assessment:       1. Skin ulcers of both feet    2. Cutaneous scleroderma        Plan:           Medihoney gel to bilateral foot ulcers and cover with hydrofiber.  Hydrofiber and gauze in between toes.  Pad left lateral foot with ABD pad for cushioning.  Cover with gauze and secure with roll gauze.  Return to clinic in 2-3 weeks.    Bilateral dorsal feet     Left dorsal foot    Right lateral ankle

## 2017-08-09 NOTE — LETTER
August 9, 2017      Citlaly Rogers, NP  [address] [facility] [address]           [place] [facility]  [address] [facility]  [place] [address]  Phone: [phone]  Fax: [phone]          Patient: Yamel Shah   MR Number: [id number]   YOB: 1950   Date of Visit: 8/9/2017       Dear Citlaly Rogers:    Thank you for referring Yamel Shah to me for evaluation. Attached you will find relevant portions of my assessment and plan of care.    If you have questions, please do not hesitate to call me. I look forward to following Yamel Shah along with you.    Sincerely,    Abena Auguste MD    Enclosure  CC:  No Recipients    If you would like to receive this communication electronically, please contact [email or web address] or [phone] to request more information on [employer] Link access.    For providers and/or their staff who would like to refer a patient to [facility], please contact us through our one-stop-shop provider referral line, [facility], at [phone].    If you feel you have received this communication in error or would no longer like to receive these types of communications, please e-mail [email or web address]

## 2017-08-09 NOTE — PROGRESS NOTES
"  Subjective:       Patient ID:  Yamel Shah is a 66 y.o. female who presents for   Chief Complaint   Patient presents with    Lesion     History of Present Illness: The patient presents with chief complaint of ulcers.  Location: feet  Duration: years  Signs/Symptoms: painful    Prior treatments: see notes Pallavi Rogers    Referred for bx to r/o vasculitis on lesion left dorsal foot.         Lesion         Review of Systems   Constitutional: Negative for fever.   Skin: Negative for itching and rash.   Hematologic/Lymphatic: Does not bruise/bleed easily.        Objective:    Physical Exam   Skin:   Areas Examined (abnormalities noted in diagram):   RLE Inspected  LLE Inspection Performed                   Assessment / Plan:      Pathology Orders:      Normal Orders This Visit    Tissue Specimen To Pathology, Dermatology     Questions:    Directional Terms:  Other(comment)    Clinical information:  scleroderma    Specific Site:  left foot        Scleroderma  -     Tissue Specimen To Pathology, Dermatology  -     Aerobic culture  Left foot  Punch bx performed after anesthesia 1% xylocaine with epi, patient tolerated procedure well.  Directions for post bx care given.   difficult to obtain due to pain and "hidebound:" quality of area   Clinical picture c/w scleroderma               Return if symptoms worsen or fail to improve.  "

## 2017-08-09 NOTE — PATIENT INSTRUCTIONS
Medihoney gel to bilateral foot ulcers and cover with hydrofiber.  Hydrofiber and gauze in between toes.  Pad left lateral foot with Kotex pad for cushioning.  Cover with gauze and secure with roll gauze.

## 2017-08-14 ENCOUNTER — TELEPHONE (OUTPATIENT)
Dept: DERMATOLOGY | Facility: CLINIC | Age: 67
End: 2017-08-14

## 2017-08-14 DIAGNOSIS — L08.0 PYODERMA: Primary | ICD-10-CM

## 2017-08-14 LAB
BACTERIA SPEC AEROBE CULT: NORMAL
BACTERIA SPEC AEROBE CULT: NORMAL

## 2017-08-14 RX ORDER — CIPROFLOXACIN 500 MG/1
500 TABLET ORAL 2 TIMES DAILY
Qty: 14 TABLET | Refills: 3 | Status: SHIPPED | OUTPATIENT
Start: 2017-08-14 | End: 2018-02-21

## 2017-08-14 NOTE — TELEPHONE ENCOUNTER
----- Message from Abena Auguste MD sent at 8/14/2017  8:51 AM CDT -----  The culture grew 2 bacteria, an antibiotic has been called in for her to take for a week twice a day.

## 2017-08-14 NOTE — TELEPHONE ENCOUNTER
Left message on voice mail (cell ) that a rx is at her pharmacy and to call the office with any questions.

## 2017-08-16 ENCOUNTER — TELEPHONE (OUTPATIENT)
Dept: DERMATOLOGY | Facility: CLINIC | Age: 67
End: 2017-08-16

## 2017-08-16 NOTE — TELEPHONE ENCOUNTER
Left another message on recorder that it is important that she take the medications prescribed. Will also mail a letter to the patient as I have not been able to reach her by phone.

## 2017-08-17 ENCOUNTER — TELEPHONE (OUTPATIENT)
Dept: DERMATOLOGY | Facility: CLINIC | Age: 67
End: 2017-08-17

## 2017-08-30 ENCOUNTER — OFFICE VISIT (OUTPATIENT)
Dept: WOUND CARE | Facility: CLINIC | Age: 67
End: 2017-08-30
Payer: MEDICARE

## 2017-08-30 VITALS
HEIGHT: 66 IN | TEMPERATURE: 98 F | WEIGHT: 144.13 LBS | DIASTOLIC BLOOD PRESSURE: 63 MMHG | SYSTOLIC BLOOD PRESSURE: 104 MMHG | BODY MASS INDEX: 23.16 KG/M2 | HEART RATE: 100 BPM

## 2017-08-30 DIAGNOSIS — L97.529 SKIN ULCERS OF BOTH FEET: Primary | ICD-10-CM

## 2017-08-30 DIAGNOSIS — I83.899 VARICOSE VEINS OF LEG WITH COMPLICATIONS: ICD-10-CM

## 2017-08-30 DIAGNOSIS — M34.89 CUTANEOUS SCLERODERMA: ICD-10-CM

## 2017-08-30 DIAGNOSIS — L97.519 SKIN ULCERS OF BOTH FEET: Primary | ICD-10-CM

## 2017-08-30 PROCEDURE — 99212 OFFICE O/P EST SF 10 MIN: CPT | Mod: S$PBB,,, | Performed by: NURSE PRACTITIONER

## 2017-08-30 PROCEDURE — 99999 PR PBB SHADOW E&M-EST. PATIENT-LVL V: CPT | Mod: PBBFAC,,, | Performed by: NURSE PRACTITIONER

## 2017-08-30 PROCEDURE — 99215 OFFICE O/P EST HI 40 MIN: CPT | Mod: PBBFAC | Performed by: NURSE PRACTITIONER

## 2017-08-30 NOTE — PROGRESS NOTES
Subjective:       Patient ID: Yamel Shah is a 66 y.o. female.    Chief Complaint: Wound Check    Wound Check     This patient is seen today for reevaluation of ulcers to both feet.  The ulcers on the left foot started in January 2015 and the one on the right foot began the end of March 2015.  She is using medihoney gel daily on the wounds and covering with a hydrofiber rope dressing.  She is unable to tolerate Santyl due to excessive burning.  The toe wounds are improving as evidenced by wound contracture.  However, the left dorsal foot and right lateral ankle wounds are larger in size due to debridement with medihoney.  All of her wounds are full-thickness.  In the past she has had pulsed Veletri infusions which have helped temporarily.  She just completed a course of cipro prescribed for a deep tissue culture positive for Citrobacter koseri and Pseudomonas aeruginosa.  She is afebrile.  She denies increased swelling, redness or purulent drainage.  Her wound healing is complicated by scleroderma and venous insufficiency.  Her pain level is 7/10.  Review of Systems   Constitutional: Negative for chills, diaphoresis and fever.   HENT: Positive for tinnitus (left hear). Negative for hearing loss, postnasal drip, rhinorrhea, sinus pressure, sneezing, sore throat and trouble swallowing.    Eyes: Negative for visual disturbance.   Respiratory: Negative for cough, shortness of breath and wheezing.    Cardiovascular: Negative for chest pain, palpitations and leg swelling.   Gastrointestinal: Negative for constipation, diarrhea, nausea and vomiting.   Genitourinary: Negative for difficulty urinating, dysuria and hematuria.        Urinary incontinence   Musculoskeletal: Negative for arthralgias, back pain and joint swelling.   Skin: Positive for wound.   Neurological: Negative for dizziness, light-headedness and headaches.   Hematological: Bruises/bleeds easily.   Psychiatric/Behavioral: Positive for sleep  disturbance. Negative for decreased concentration. The patient is not nervous/anxious.        Objective:      Physical Exam   Constitutional: She is oriented to person, place, and time. She appears well-developed and well-nourished. No distress.   HENT:   Head: Normocephalic and atraumatic.   Neck: Normal range of motion.   Cardiovascular: Intact distal pulses.    Pulmonary/Chest: Effort normal. No respiratory distress.   Musculoskeletal: Normal range of motion. She exhibits no edema or tenderness.        Feet:    Neurological: She is alert and oriented to person, place, and time.   Skin: Skin is warm and dry. No rash noted. She is not diaphoretic. No cyanosis or erythema. Nails show no clubbing.   Psychiatric: She has a normal mood and affect. Her behavior is normal. Judgment and thought content normal.   Nursing note and vitals reviewed.      Assessment:       1. Skin ulcers of both feet    2. Cutaneous scleroderma        Plan:           Medihoney gel to bilateral foot full-thickness ulcers and cover with hydrofiber.  Hydrofiber and gauze in between toes.  Pad left lateral foot with ABD pad for cushioning.  Cover with gauze and secure with roll gauze.  Return to clinic in 3-4 weeks.    Bilateral dorsal feet      Left dorsal foot      Right lateral ankle

## 2017-08-30 NOTE — PATIENT INSTRUCTIONS
Medihoney gel to bilateral foot ulcers and cover with hydrofiber.  Hydrofiber and gauze in between toes.  Pad left lateral foot with ABD pad for cushioning.  Cover with gauze and secure with roll gauze.

## 2017-09-06 RX ORDER — ERGOCALCIFEROL 1.25 MG/1
CAPSULE ORAL
Qty: 12 CAPSULE | Refills: 0 | Status: SHIPPED | OUTPATIENT
Start: 2017-09-06 | End: 2017-11-30 | Stop reason: SDUPTHER

## 2017-09-24 RX ORDER — NIFEDIPINE 90 MG/1
TABLET, FILM COATED, EXTENDED RELEASE ORAL
Qty: 90 TABLET | Refills: 3 | Status: SHIPPED | OUTPATIENT
Start: 2017-09-24 | End: 2018-02-21 | Stop reason: SDUPTHER

## 2017-09-28 ENCOUNTER — TELEPHONE (OUTPATIENT)
Dept: WOUND CARE | Facility: CLINIC | Age: 67
End: 2017-09-28

## 2017-10-09 ENCOUNTER — TELEPHONE (OUTPATIENT)
Dept: TRANSPLANT | Facility: CLINIC | Age: 67
End: 2017-10-09

## 2017-10-09 NOTE — TELEPHONE ENCOUNTER
"Note from Pharmacy. Patient has been unreachable. Gave pharmacy her emergency contact and will also try to reach patient.    "Yamel Shah, 1950, [place]  I just tried reaching out to schedule next shipment. I called [phone] first; the voicemail was full and I was unable to leave a message. [phone] just rings with no option to leave a message. Do you have a better contact number for patient? I left a live message on 9/29. The automated system left messages on 9/20 and 9/27. We also sent refill reminders via email on 9/15, 9/17, 9/26, 9/29, 10/1 and today (10/9). We last shipped a 90 day supply of medication on 6/26; she had medication on hand at that time but should be running low. Thanks!"  "

## 2017-10-12 ENCOUNTER — OFFICE VISIT (OUTPATIENT)
Dept: WOUND CARE | Facility: CLINIC | Age: 67
End: 2017-10-12
Payer: MEDICARE

## 2017-10-12 VITALS
SYSTOLIC BLOOD PRESSURE: 102 MMHG | HEIGHT: 66 IN | DIASTOLIC BLOOD PRESSURE: 50 MMHG | TEMPERATURE: 98 F | HEART RATE: 69 BPM | BODY MASS INDEX: 22.16 KG/M2 | WEIGHT: 137.88 LBS

## 2017-10-12 DIAGNOSIS — L97.519 SKIN ULCERS OF BOTH FEET: Primary | ICD-10-CM

## 2017-10-12 DIAGNOSIS — M34.89 CUTANEOUS SCLERODERMA: ICD-10-CM

## 2017-10-12 DIAGNOSIS — L97.529 SKIN ULCERS OF BOTH FEET: Primary | ICD-10-CM

## 2017-10-12 PROCEDURE — 99999 PR PBB SHADOW E&M-EST. PATIENT-LVL V: CPT | Mod: PBBFAC,,, | Performed by: NURSE PRACTITIONER

## 2017-10-12 PROCEDURE — 99215 OFFICE O/P EST HI 40 MIN: CPT | Mod: PBBFAC | Performed by: NURSE PRACTITIONER

## 2017-10-12 PROCEDURE — 99212 OFFICE O/P EST SF 10 MIN: CPT | Mod: S$PBB,,, | Performed by: NURSE PRACTITIONER

## 2017-10-12 NOTE — PROGRESS NOTES
Subjective:       Patient ID: Yamel Shah is a 66 y.o. female.    Chief Complaint: Wound Check    Wound Check     This patient is seen today for reevaluation of ulcers to both feet.  The ulcers on the left foot started in January 2015 and the one on the right foot began the end of March 2015.  She is using medihoney gel daily on the wounds and covering with a hydrofiber rope dressing.  She is unable to tolerate Santyl due to excessive burning.  The toe wounds are improving as evidenced by wound contracture.  However, the left dorsal foot and right lateral ankle wounds are larger in size due to debridement with medihoney.  All of her wounds are full-thickness.  In the past she has had pulsed Veletri infusions which have helped temporarily.  She is afebrile.  She denies increased swelling, redness or purulent drainage.  Her wound healing is complicated by scleroderma and venous insufficiency.  Her pain level is 5/10.  Review of Systems   Constitutional: Negative for chills, diaphoresis and fever.   HENT: Positive for tinnitus (left hear). Negative for hearing loss, postnasal drip, rhinorrhea, sinus pressure, sneezing, sore throat and trouble swallowing.    Eyes: Negative for visual disturbance.   Respiratory: Negative for cough, shortness of breath and wheezing.    Cardiovascular: Negative for chest pain, palpitations and leg swelling.   Gastrointestinal: Negative for constipation, diarrhea, nausea and vomiting.   Genitourinary: Negative for difficulty urinating, dysuria and hematuria.        Urinary incontinence   Musculoskeletal: Negative for arthralgias, back pain and joint swelling.   Skin: Positive for wound.   Neurological: Negative for dizziness, light-headedness and headaches.   Hematological: Bruises/bleeds easily.   Psychiatric/Behavioral: Positive for sleep disturbance. Negative for decreased concentration. The patient is not nervous/anxious.        Objective:      Physical Exam   Constitutional:  "She is oriented to person, place, and time. She appears well-developed and well-nourished. No distress.   HENT:   Head: Normocephalic and atraumatic.   Neck: Normal range of motion.   Cardiovascular: Intact distal pulses.    Pulmonary/Chest: Effort normal. No respiratory distress.   Musculoskeletal: Normal range of motion. She exhibits no edema or tenderness.        Feet:    Neurological: She is alert and oriented to person, place, and time.   Skin: Skin is warm and dry. No rash noted. She is not diaphoretic. No cyanosis or erythema. Nails show no clubbing.   Psychiatric: She has a normal mood and affect. Her behavior is normal. Judgment and thought content normal.   Nursing note and vitals reviewed.      Assessment:       1. Skin ulcers of both feet    2. Cutaneous scleroderma        Plan:           Medihoney gel to bilateral foot full-thickness ulcers and cover with hydrofiber.  Hydrofiber and gauze in between toes.  Pad left lateral foot with ABD pad for cushioning.  Cover with gauze and secure with roll gauze.  Compression with one 4" ace wrap bilateral lower legs.  Return to clinic in 4 weeks.    Bilateral dorsal feet      Bilateral plantar feet      Left medial third toe      Right lateral ankle                                                                                        "

## 2017-10-12 NOTE — PATIENT INSTRUCTIONS
"Medihoney gel to bilateral foot full-thickness ulcers and cover with hydrofiber.  Hydrofiber and gauze in between toes.  Pad left lateral foot with ABD pad for cushioning.  Cover with gauze and secure with roll gauze.  Compression with one 4" ace wrap bilateral lower legs.    "

## 2017-10-17 RX ORDER — GABAPENTIN 300 MG/1
CAPSULE ORAL
Qty: 270 CAPSULE | Refills: 0 | Status: SHIPPED | OUTPATIENT
Start: 2017-10-17 | End: 2018-01-02 | Stop reason: SDUPTHER

## 2017-11-02 DIAGNOSIS — M34.9 SCLERODERMA: ICD-10-CM

## 2017-11-02 DIAGNOSIS — E78.5 HYPERLIPIDEMIA: ICD-10-CM

## 2017-11-03 ENCOUNTER — TELEPHONE (OUTPATIENT)
Dept: RHEUMATOLOGY | Facility: CLINIC | Age: 67
End: 2017-11-03

## 2017-11-03 DIAGNOSIS — Z79.899 ON STATIN THERAPY: ICD-10-CM

## 2017-11-03 DIAGNOSIS — M34.9 SCLERODERMA: Primary | ICD-10-CM

## 2017-11-03 RX ORDER — PRAVASTATIN SODIUM 20 MG/1
TABLET ORAL
Qty: 90 TABLET | Refills: 3 | Status: SHIPPED | OUTPATIENT
Start: 2017-11-03 | End: 2018-02-21 | Stop reason: SDUPTHER

## 2017-11-03 NOTE — TELEPHONE ENCOUNTER
Please schedule overdue standing labs and lipid panel and visit with fellow and myself, former pt of Dr. Adames Thanks OMAR

## 2017-11-09 ENCOUNTER — OFFICE VISIT (OUTPATIENT)
Dept: WOUND CARE | Facility: CLINIC | Age: 67
End: 2017-11-09
Payer: MEDICARE

## 2017-11-09 VITALS
DIASTOLIC BLOOD PRESSURE: 49 MMHG | HEART RATE: 92 BPM | TEMPERATURE: 97 F | SYSTOLIC BLOOD PRESSURE: 99 MMHG | HEIGHT: 66 IN | WEIGHT: 141.19 LBS | BODY MASS INDEX: 22.69 KG/M2

## 2017-11-09 DIAGNOSIS — M34.89 CUTANEOUS SCLERODERMA: ICD-10-CM

## 2017-11-09 DIAGNOSIS — L97.529 SKIN ULCERS OF BOTH FEET: Primary | ICD-10-CM

## 2017-11-09 DIAGNOSIS — L97.519 SKIN ULCERS OF BOTH FEET: Primary | ICD-10-CM

## 2017-11-09 PROCEDURE — 99215 OFFICE O/P EST HI 40 MIN: CPT | Mod: PBBFAC | Performed by: NURSE PRACTITIONER

## 2017-11-09 PROCEDURE — 99999 PR PBB SHADOW E&M-EST. PATIENT-LVL V: CPT | Mod: PBBFAC,,, | Performed by: NURSE PRACTITIONER

## 2017-11-09 PROCEDURE — 99212 OFFICE O/P EST SF 10 MIN: CPT | Mod: S$PBB,,, | Performed by: NURSE PRACTITIONER

## 2017-11-09 NOTE — PROGRESS NOTES
Subjective:       Patient ID: Yamel Shah is a 66 y.o. female.    Chief Complaint: Wound Check    Wound Check     This patient is seen today for reevaluation of ulcers to both feet.  The ulcers on the left foot started in January 2015 and the one on the right foot began the end of March 2015.  She is using medihoney gel daily on the wounds and covering with a hydrofiber rope dressing.  She is unable to tolerate Santyl due to excessive burning.  All of the original wounds are healing.  However she has new wounds to the left lateral ankle and right medial foot.  All of her wounds are full-thickness.  In the past she has had pulsed Veletri infusions which have helped temporarily.  She is afebrile.  She denies increased swelling, redness or purulent drainage.  Her wound healing is complicated by scleroderma and venous insufficiency.  Her pain level is 5/10.  Review of Systems   Constitutional: Negative for chills, diaphoresis and fever.   HENT: Positive for tinnitus (left hear). Negative for hearing loss, postnasal drip, rhinorrhea, sinus pressure, sneezing, sore throat and trouble swallowing.    Eyes: Negative for visual disturbance.   Respiratory: Negative for cough, shortness of breath and wheezing.    Cardiovascular: Negative for chest pain, palpitations and leg swelling.   Gastrointestinal: Negative for constipation, diarrhea, nausea and vomiting.   Genitourinary: Negative for difficulty urinating, dysuria and hematuria.        Urinary incontinence   Musculoskeletal: Negative for arthralgias, back pain and joint swelling.   Skin: Positive for wound.   Neurological: Negative for dizziness, light-headedness and headaches.   Hematological: Bruises/bleeds easily.   Psychiatric/Behavioral: Positive for sleep disturbance. Negative for decreased concentration. The patient is not nervous/anxious.        Objective:      Physical Exam   Constitutional: She is oriented to person, place, and time. She appears  "well-developed and well-nourished. No distress.   HENT:   Head: Normocephalic and atraumatic.   Neck: Normal range of motion.   Cardiovascular: Intact distal pulses.    Pulmonary/Chest: Effort normal. No respiratory distress.   Musculoskeletal: Normal range of motion. She exhibits no edema or tenderness.        Feet:    Neurological: She is alert and oriented to person, place, and time.   Skin: Skin is warm and dry. No rash noted. She is not diaphoretic. No cyanosis or erythema. Nails show no clubbing.   Psychiatric: She has a normal mood and affect. Her behavior is normal. Judgment and thought content normal.   Nursing note and vitals reviewed.      Assessment:       1. Skin ulcers of both feet    2. Cutaneous scleroderma        Plan:           Medihoney gel to bilateral foot full-thickness ulcers and cover with hydrofiber.  Hydrofiber and gauze in between toes.  Pad left lateral foot with ABD pad for cushioning.  Cover with gauze and secure with roll gauze.  Compression with one 4" ace wrap bilateral lower legs.  Return to clinic in 4 weeks.    Right dorsal foot    Left dorsal foot    Left medial second toe    Left lateral great toe    Left lateral dorsal foot and ankle ulcers    Right medial foot    Right lateral ankle                                                                                        "

## 2017-12-02 RX ORDER — ERGOCALCIFEROL 1.25 MG/1
CAPSULE ORAL
Qty: 12 CAPSULE | Refills: 0 | Status: SHIPPED | OUTPATIENT
Start: 2017-12-02 | End: 2018-02-21 | Stop reason: SDUPTHER

## 2017-12-07 ENCOUNTER — OFFICE VISIT (OUTPATIENT)
Dept: WOUND CARE | Facility: CLINIC | Age: 67
End: 2017-12-07
Payer: MEDICARE

## 2017-12-07 ENCOUNTER — TELEPHONE (OUTPATIENT)
Dept: RHEUMATOLOGY | Facility: CLINIC | Age: 67
End: 2017-12-07

## 2017-12-07 VITALS
SYSTOLIC BLOOD PRESSURE: 124 MMHG | TEMPERATURE: 97 F | HEIGHT: 66 IN | WEIGHT: 143.31 LBS | HEART RATE: 74 BPM | DIASTOLIC BLOOD PRESSURE: 48 MMHG | BODY MASS INDEX: 23.03 KG/M2

## 2017-12-07 DIAGNOSIS — M34.89 CUTANEOUS SCLERODERMA: ICD-10-CM

## 2017-12-07 DIAGNOSIS — L97.519 SKIN ULCERS OF BOTH FEET: Primary | ICD-10-CM

## 2017-12-07 DIAGNOSIS — L97.529 SKIN ULCERS OF BOTH FEET: Primary | ICD-10-CM

## 2017-12-07 PROCEDURE — 99212 OFFICE O/P EST SF 10 MIN: CPT | Mod: S$PBB,,, | Performed by: NURSE PRACTITIONER

## 2017-12-07 PROCEDURE — 99999 PR PBB SHADOW E&M-EST. PATIENT-LVL V: CPT | Mod: PBBFAC,,, | Performed by: NURSE PRACTITIONER

## 2017-12-07 PROCEDURE — 99215 OFFICE O/P EST HI 40 MIN: CPT | Mod: PBBFAC | Performed by: NURSE PRACTITIONER

## 2017-12-07 NOTE — PROGRESS NOTES
Subjective:       Patient ID: Yamel Shah is a 67 y.o. female.    Chief Complaint: Wound Check    Wound Check     This patient is seen today for reevaluation of ulcers to both feet.  The ulcers on the left foot started in January 2015 and the one on the right foot began the end of March 2015.  She is using medihoney gel daily on the wounds and covering with a hydrofiber rope dressing.  She is unable to tolerate Santyl due to excessive burning.  The wounds are healing very slowly.  All of her wounds are full-thickness.  In the past she has had pulsed Veletri infusions which have helped temporarily.  She is afebrile.  She denies increased swelling, redness or purulent drainage.  Her wound healing is complicated by scleroderma and venous insufficiency.  Her pain level is 6/10.  Review of Systems   Constitutional: Negative for chills, diaphoresis and fever.   HENT: Positive for tinnitus (left hear). Negative for hearing loss, postnasal drip, rhinorrhea, sinus pressure, sneezing, sore throat and trouble swallowing.    Eyes: Negative for visual disturbance.   Respiratory: Negative for cough, shortness of breath and wheezing.    Cardiovascular: Negative for chest pain, palpitations and leg swelling.   Gastrointestinal: Negative for constipation, diarrhea, nausea and vomiting.   Genitourinary: Negative for difficulty urinating, dysuria and hematuria.        Urinary incontinence   Musculoskeletal: Negative for arthralgias, back pain and joint swelling.   Skin: Positive for wound.   Neurological: Negative for dizziness, light-headedness and headaches.   Hematological: Bruises/bleeds easily.   Psychiatric/Behavioral: Positive for sleep disturbance. Negative for decreased concentration. The patient is not nervous/anxious.        Objective:      Physical Exam   Constitutional: She is oriented to person, place, and time. She appears well-developed and well-nourished. No distress.   HENT:   Head: Normocephalic and  atraumatic.   Neck: Normal range of motion.   Cardiovascular: Intact distal pulses.    Pulmonary/Chest: Effort normal. No respiratory distress.   Musculoskeletal: Normal range of motion. She exhibits no edema or tenderness.        Feet:    Neurological: She is alert and oriented to person, place, and time.   Skin: Skin is warm and dry. No rash noted. She is not diaphoretic. No cyanosis or erythema. Nails show no clubbing.   Psychiatric: She has a normal mood and affect. Her behavior is normal. Judgment and thought content normal.   Nursing note and vitals reviewed.      Assessment:       1. Skin ulcers of both feet    2. Cutaneous scleroderma        Plan:           Iodosorb gel to bilateral foot full-thickness ulcers and cover with hydrofiber.  Hydrofiber and gauze in between toes.  Pad left lateral foot with ABD pad for cushioning.  Cover with gauze and secure with roll gauze.  Return to clinic in 4 weeks.    Right dorsal foot      Left dorsal foot      Left medial second toe      Left lateral dorsal foot and ankle ulcers      Right medial foot        Right lateral ankle      Right medial ankle

## 2017-12-07 NOTE — TELEPHONE ENCOUNTER
Note 11/3/17:     Please schedule overdue standing labs and lipid panel and visit with fellow and myself, former pt of Dr. Adames Thanks OMAR

## 2018-01-01 ENCOUNTER — OFFICE VISIT (OUTPATIENT)
Dept: URGENT CARE | Facility: CLINIC | Age: 68
End: 2018-01-01
Payer: MEDICARE

## 2018-01-01 VITALS
RESPIRATION RATE: 16 BRPM | TEMPERATURE: 99 F | SYSTOLIC BLOOD PRESSURE: 156 MMHG | HEART RATE: 74 BPM | OXYGEN SATURATION: 100 % | WEIGHT: 143 LBS | BODY MASS INDEX: 22.98 KG/M2 | DIASTOLIC BLOOD PRESSURE: 59 MMHG | HEIGHT: 66 IN

## 2018-01-01 DIAGNOSIS — J98.01 ACUTE BRONCHOSPASM: Primary | ICD-10-CM

## 2018-01-01 DIAGNOSIS — B96.89 ACUTE BACTERIAL SINUSITIS: ICD-10-CM

## 2018-01-01 DIAGNOSIS — J20.9 ACUTE PURULENT BRONCHITIS: ICD-10-CM

## 2018-01-01 DIAGNOSIS — J01.90 ACUTE BACTERIAL SINUSITIS: ICD-10-CM

## 2018-01-01 PROCEDURE — 99213 OFFICE O/P EST LOW 20 MIN: CPT | Mod: 25,S$GLB,, | Performed by: INTERNAL MEDICINE

## 2018-01-01 PROCEDURE — 96372 THER/PROPH/DIAG INJ SC/IM: CPT | Mod: S$GLB,,, | Performed by: INTERNAL MEDICINE

## 2018-01-01 RX ORDER — ALBUTEROL SULFATE 90 UG/1
2 AEROSOL, METERED RESPIRATORY (INHALATION) EVERY 4 HOURS PRN
Qty: 1 INHALER | Refills: 0 | Status: SHIPPED | OUTPATIENT
Start: 2018-01-01 | End: 2018-01-31

## 2018-01-01 RX ORDER — AZITHROMYCIN 250 MG/1
TABLET, FILM COATED ORAL
Qty: 6 TABLET | Refills: 0 | Status: SHIPPED | OUTPATIENT
Start: 2018-01-01 | End: 2018-01-09 | Stop reason: ALTCHOICE

## 2018-01-01 RX ORDER — BETAMETHASONE SODIUM PHOSPHATE AND BETAMETHASONE ACETATE 3; 3 MG/ML; MG/ML
9 INJECTION, SUSPENSION INTRA-ARTICULAR; INTRALESIONAL; INTRAMUSCULAR; SOFT TISSUE ONCE
Status: COMPLETED | OUTPATIENT
Start: 2018-01-01 | End: 2018-01-01

## 2018-01-01 RX ADMIN — BETAMETHASONE SODIUM PHOSPHATE AND BETAMETHASONE ACETATE 9 MG: 3; 3 INJECTION, SUSPENSION INTRA-ARTICULAR; INTRALESIONAL; INTRAMUSCULAR; SOFT TISSUE at 02:01

## 2018-01-01 NOTE — PROGRESS NOTES
"Subjective:       Patient ID: Yamel Shah is a 67 y.o. female.    Vitals:  height is 5' 6" (1.676 m) and weight is 64.9 kg (143 lb). Her temperature is 98.8 °F (37.1 °C). Her blood pressure is 156/59 (abnormal) and her pulse is 74. Her respiration is 16 and oxygen saturation is 100%.     Chief Complaint: Sore Throat    Pt states sore throat started yesterday, pt also has cough and runny nose      Sore Throat    This is a new problem. The current episode started yesterday. The problem has been gradually worsening. There has been no fever. Associated symptoms include coughing and trouble swallowing. Pertinent negatives include no abdominal pain, congestion, ear pain, headaches, hoarse voice or shortness of breath.     Review of Systems   Constitution: Negative for chills, fever and malaise/fatigue.   HENT: Positive for sore throat and trouble swallowing. Negative for congestion, ear pain and hoarse voice.    Eyes: Negative for discharge and redness.   Cardiovascular: Negative for chest pain, dyspnea on exertion and leg swelling.   Respiratory: Positive for cough and sputum production. Negative for shortness of breath and wheezing.    Musculoskeletal: Negative for myalgias.   Gastrointestinal: Negative for abdominal pain and nausea.   Neurological: Negative for headaches.       Objective:      Physical Exam   Constitutional: She appears well-developed and well-nourished.   HENT:   Head: Normocephalic and atraumatic.   Nose: Mucosal edema present. Right sinus exhibits maxillary sinus tenderness. Left sinus exhibits maxillary sinus tenderness.   Mouth/Throat: Posterior oropharyngeal edema and posterior oropharyngeal erythema present.   Eyes: Conjunctivae and EOM are normal. Pupils are equal, round, and reactive to light.   Neck: Normal range of motion. Neck supple.   Cardiovascular: Normal rate.    Pulmonary/Chest: Effort normal.   Upper airway congestion with exp wheeze   Vitals reviewed.      Assessment:     "   1. Acute bronchospasm    2. Acute purulent bronchitis    3. Acute bacterial sinusitis        Plan:         Acute bronchospasm  -     betamethasone acetate-betamethasone sodium phosphate injection 9 mg; Inject 1.5 mLs (9 mg total) into the muscle once.  -     albuterol 90 mcg/actuation inhaler; Inhale 2 puffs into the lungs every 4 (four) hours as needed for Wheezing. Rescue  Dispense: 1 Inhaler; Refill: 0    Acute purulent bronchitis  -     azithromycin (ZITHROMAX Z-IMELDA) 250 MG tablet; Take 2 tablets (500 mg) on  Day 1,  followed by 1 tablet (250 mg) once daily on Days 2 through 5.  Dispense: 6 tablet; Refill: 0    Acute bacterial sinusitis  -     betamethasone acetate-betamethasone sodium phosphate injection 9 mg; Inject 1.5 mLs (9 mg total) into the muscle once.  -     azithromycin (ZITHROMAX Z-IMELDA) 250 MG tablet; Take 2 tablets (500 mg) on  Day 1,  followed by 1 tablet (250 mg) once daily on Days 2 through 5.  Dispense: 6 tablet; Refill: 0

## 2018-01-02 ENCOUNTER — OFFICE VISIT (OUTPATIENT)
Dept: TRANSPLANT | Facility: CLINIC | Age: 68
End: 2018-01-02
Payer: MEDICARE

## 2018-01-02 ENCOUNTER — OFFICE VISIT (OUTPATIENT)
Dept: WOUND CARE | Facility: CLINIC | Age: 68
End: 2018-01-02
Payer: MEDICARE

## 2018-01-02 ENCOUNTER — TELEPHONE (OUTPATIENT)
Dept: RHEUMATOLOGY | Facility: CLINIC | Age: 68
End: 2018-01-02

## 2018-01-02 VITALS
TEMPERATURE: 98 F | WEIGHT: 142 LBS | BODY MASS INDEX: 22.82 KG/M2 | DIASTOLIC BLOOD PRESSURE: 51 MMHG | SYSTOLIC BLOOD PRESSURE: 108 MMHG | HEART RATE: 82 BPM | HEIGHT: 66 IN

## 2018-01-02 VITALS
SYSTOLIC BLOOD PRESSURE: 127 MMHG | OXYGEN SATURATION: 97 % | HEIGHT: 66 IN | WEIGHT: 145.31 LBS | DIASTOLIC BLOOD PRESSURE: 60 MMHG | BODY MASS INDEX: 23.35 KG/M2 | HEART RATE: 87 BPM

## 2018-01-02 DIAGNOSIS — L97.519 SKIN ULCERS OF BOTH FEET: Primary | ICD-10-CM

## 2018-01-02 DIAGNOSIS — I51.7 CARDIOMEGALY: ICD-10-CM

## 2018-01-02 DIAGNOSIS — L97.529 SKIN ULCERS OF BOTH FEET: ICD-10-CM

## 2018-01-02 DIAGNOSIS — M34.9 SCLERODERMA: ICD-10-CM

## 2018-01-02 DIAGNOSIS — L97.519 SKIN ULCERS OF BOTH FEET: ICD-10-CM

## 2018-01-02 DIAGNOSIS — M34.89 CUTANEOUS SCLERODERMA: ICD-10-CM

## 2018-01-02 DIAGNOSIS — L97.529 SKIN ULCERS OF BOTH FEET: Primary | ICD-10-CM

## 2018-01-02 DIAGNOSIS — I27.29 PULMONARY HYPERTENSION ASSOCIATED WITH SYSTEMIC DISORDER: Primary | Chronic | ICD-10-CM

## 2018-01-02 PROCEDURE — 99213 OFFICE O/P EST LOW 20 MIN: CPT | Mod: PBBFAC,27 | Performed by: INTERNAL MEDICINE

## 2018-01-02 PROCEDURE — 99999 PR PBB SHADOW E&M-EST. PATIENT-LVL V: CPT | Mod: PBBFAC,,, | Performed by: NURSE PRACTITIONER

## 2018-01-02 PROCEDURE — 99204 OFFICE O/P NEW MOD 45 MIN: CPT | Mod: S$PBB,,, | Performed by: INTERNAL MEDICINE

## 2018-01-02 PROCEDURE — 99212 OFFICE O/P EST SF 10 MIN: CPT | Mod: S$PBB,,, | Performed by: NURSE PRACTITIONER

## 2018-01-02 PROCEDURE — 99215 OFFICE O/P EST HI 40 MIN: CPT | Mod: PBBFAC | Performed by: NURSE PRACTITIONER

## 2018-01-02 PROCEDURE — 99999 PR PBB SHADOW E&M-EST. PATIENT-LVL III: CPT | Mod: PBBFAC,,, | Performed by: INTERNAL MEDICINE

## 2018-01-02 RX ORDER — GABAPENTIN 300 MG/1
300 CAPSULE ORAL 3 TIMES DAILY
Qty: 360 CAPSULE | Refills: 1 | Status: SHIPPED | OUTPATIENT
Start: 2018-01-02 | End: 2018-02-21 | Stop reason: SDUPTHER

## 2018-01-02 RX ORDER — BENZONATATE 100 MG/1
100 CAPSULE ORAL 3 TIMES DAILY PRN
Qty: 60 CAPSULE | Refills: 1 | Status: SHIPPED | OUTPATIENT
Start: 2018-01-02 | End: 2018-01-12

## 2018-01-02 NOTE — PROGRESS NOTES
Subjective:    Patient ID:  Yamel Shah is a 67 y.o. female who presents for follow-up of Pulmonary Hypertension (6mo visit. Pt seen in  for Bronchitis yesterday)      HPI   very pleasant 67 year old woman with scleroderma, diagnosed in 1983, previously followed by Dr Boudreaux for pulmonary hypertension here for PH f/u-  She was started on Revatio 2013, but had difficulty with affording it, so she was switched to Adcirca 40mg daily.     Since last visit pt was dx'd with bronchitis (had an urgent care visit yest and given a zpack albuterol and mucinex)  Sx started Sunday as she was flying back from seeing her daughter. Before this says everything was going well, was doing ok with her breathing- main issue has been ulcers on her feet. Has had a little swelling in her ankles which she attributes to inflammation, and because she's not walking due to the ulcers she tires more easily when she walks than she used to.  She is going to wound care and sees Dr Ahuja again later this month. Says she has a little discomofrt in her chest from coughing, but other than that none, sleeps on 3 pillows for her acid reflux- no PND.       No 6mw today due to the ulcers in her feet    TTE 8/17    1 - Upper limit of normal left ventricular enlargement.     2 - Normal left ventricular systolic function (EF 60-65%).     3 - No wall motion abnormalities.     4 - Normal left ventricular diastolic function.     5 - Mild left atrial enlargement.     6 - Normal right ventricular systolic function .     7 - Trivial to mild mitral regurgitation.     8 - The estimated PA systolic pressure is 28 mmHg.     TTE   1 - Normal left ventricular systolic function (EF 60-65%).     2 - Mild left atrial enlargement.     3 - Normal right ventricular systolic function .     4 - The estimated PA systolic pressure is 30 mmHg.     PFTs 9/16  Normal airflow, mild restriction, normal DLCO    CT chest 9/15  Stable-appearing chronic interstitial lung  "disease consistent with patient's history of scleroderma. No acute abnormality is identified.  Given the long-term stability of findings dating back to 2007, follow-up as clinically warranted.    Dependent secretions throughout the esophagus to suggest dysmotility  RHC 6/29/ 15    AOSAT: 97  FICKCI: 3.42  FICKCO: 6.06  PAPRES: 35/12 (21)  PASAT: 73  PVR: 1.98  PWPRES: 12/12 (9)  RAPRES: 11/9 (5)  RVPRES: 31/1, 6    TTE 6/15  1 - Normal left ventricular systolic function (EF 60-65%).   2 - Normal left ventricular diastolic function.   3 - Normal right ventricular systolic function .   4 - Trivial tricuspid regurgitation.      Review of Systems   Constitution: Negative for chills, fever, malaise/fatigue and weight gain.   HENT: Negative.    Eyes: Negative.    Cardiovascular: Negative for chest pain, dyspnea on exertion, leg swelling, near-syncope, orthopnea, palpitations, paroxysmal nocturnal dyspnea and syncope.   Respiratory: Negative for cough and shortness of breath.    Endocrine: Negative.    Skin: Negative.    Musculoskeletal: Negative.    Gastrointestinal: Negative for bloating, abdominal pain and change in bowel habit.   Neurological: Negative for dizziness and light-headedness.   Psychiatric/Behavioral: Negative for depression.        Objective:   /60   Pulse 87   Ht 5' 6" (1.676 m)   Wt 65.9 kg (145 lb 4.5 oz)   SpO2 97%   BMI 23.45 kg/m²       Physical Exam   Constitutional: She is oriented to person, place, and time. She appears well-developed and well-nourished.   HENT:   Head: Normocephalic and atraumatic.   Eyes: Right eye exhibits no discharge. Left eye exhibits no discharge.   Neck: Neck supple. No JVD present. No thyromegaly present.   Cardiovascular: Normal rate and regular rhythm.  Exam reveals no gallop and no friction rub.    No murmur heard.       Pulmonary/Chest: Effort normal and breath sounds normal. No respiratory distress. She has no wheezes. She has no rales.   Abdominal: Soft. " Bowel sounds are normal. She exhibits no distension. There is no tenderness.   Musculoskeletal: Normal range of motion. She exhibits no edema or tenderness.   Neurological: She is alert and oriented to person, place, and time. No cranial nerve deficit. Coordination normal.   Skin: Skin is warm and dry. No rash noted.   Severe tightening of skin hands   Psychiatric: She has a normal mood and affect. Judgment and thought content normal.         Lab Results   Component Value Date    BNP 48 06/27/2017     06/27/2017    K 3.8 06/27/2017    MG 2.1 10/09/2016     06/27/2017    CO2 23 06/27/2017    BUN 12 06/27/2017    CREATININE 0.7 06/27/2017    GLU 99 06/27/2017    AST 17 06/27/2017    ALT 8 (L) 06/27/2017    ALBUMIN 3.3 (L) 06/27/2017    PROT 8.4 06/27/2017    BILITOT 0.3 06/27/2017    CHOL 122 06/01/2016    HDL 46 06/01/2016    LDLCALC 65.4 06/01/2016    TRIG 53 06/01/2016       Magnesium   Date Value Ref Range Status   10/09/2016 2.1 1.6 - 2.6 mg/dL Final       Lab Results   Component Value Date    WBC 5.35 06/27/2017    HGB 8.1 (L) 06/27/2017    HCT 27.9 (L) 06/27/2017    MCV 71 (L) 06/27/2017     06/27/2017       Lab Results   Component Value Date    INR 1.0 06/29/2015    INR 1.0 01/21/2013    INR 1.0 09/29/2007       BNP   Date Value Ref Range Status   06/27/2017 48 0 - 99 pg/mL Final     Comment:     Values of less than 100 pg/ml are consistent with non-CHF populations.   07/22/2016 69 0 - 99 pg/mL Final     Comment:     Values of less than 100 pg/ml are consistent with non-CHF populations.   09/01/2015 46 0 - 99 pg/mL Final     Comment:     Values of less than 100 pg/ml are consistent with non-CHF populations.       LD   Date Value Ref Range Status   09/30/2007 139 110 - 260 U/L Final           Assessment:       1. Pulmonary hypertension associated with systemic disorder- normal PAP on RHC  with normal RV function by echo, BNP normal, euvolemic on exam- no 6mw due to foot ulcers   2.  Scleroderma    3. Telangiectasia    4. ILD (interstitial lung disease) - PFts also surprisingly good, chest imaging stable        Plan:    no PH medicine changes today- added tessalon perles for her to take a night to help her rest with the cough and renewed her neurontin today    From a PH standpoint, based on her last echo and RHC she appears to be doing very well- PFTs also surprisingly good, chest imaging stable    Will plan to see pt back this summer with repeat echo, labs and walk if she is able to do it

## 2018-01-02 NOTE — PATIENT INSTRUCTIONS
Continue mucinex during the day, try the tessalon perles to help you rest at night    By next Monday if no better call your primary care doc     Keep salt intake to under 2000 mg sodium, fluids to under 2 L (64 oz)    Call us if you find yourself getting more short of breath, have more swelling or unexpected weight changes

## 2018-01-02 NOTE — PROGRESS NOTES
Subjective:       Patient ID: Yamel Shah is a 67 y.o. female.    Chief Complaint: Wound Check    Wound Check     This patient is seen today for reevaluation of ulcers to both feet.  The ulcers on the left foot started in January 2015 and the one on the right foot began the end of March 2015.  She is using iodosorb gel daily on the wounds and covering with a hydrofiber rope dressing.  She is unable to tolerate the iodosorb due to excessive burning.  The wounds are healing very slowly.  All of her wounds are full-thickness.  In the past she has had pulsed Veletri infusions which have helped temporarily.  She is afebrile.  She denies increased swelling, redness or purulent drainage.  Her wound healing is complicated by scleroderma and venous insufficiency.  Her pain level is 8/10.  Review of Systems   Constitutional: Negative for chills, diaphoresis and fever.   HENT: Positive for tinnitus (left hear). Negative for hearing loss, postnasal drip, rhinorrhea, sinus pressure, sneezing, sore throat and trouble swallowing.    Eyes: Negative for visual disturbance.   Respiratory: Negative for cough, shortness of breath and wheezing.    Cardiovascular: Negative for chest pain, palpitations and leg swelling.   Gastrointestinal: Negative for constipation, diarrhea, nausea and vomiting.   Genitourinary: Negative for difficulty urinating, dysuria and hematuria.        Urinary incontinence   Musculoskeletal: Negative for arthralgias, back pain and joint swelling.   Skin: Positive for wound.   Neurological: Negative for dizziness, light-headedness and headaches.   Hematological: Bruises/bleeds easily.   Psychiatric/Behavioral: Positive for sleep disturbance. Negative for decreased concentration. The patient is not nervous/anxious.        Objective:      Physical Exam   Constitutional: She is oriented to person, place, and time. She appears well-developed and well-nourished. No distress.   HENT:   Head: Normocephalic and  atraumatic.   Neck: Normal range of motion.   Cardiovascular: Intact distal pulses.    Pulmonary/Chest: Effort normal. No respiratory distress.   Musculoskeletal: Normal range of motion. She exhibits no edema or tenderness.        Feet:    Neurological: She is alert and oriented to person, place, and time.   Skin: Skin is warm and dry. No rash noted. She is not diaphoretic. No cyanosis or erythema. Nails show no clubbing.   Psychiatric: She has a normal mood and affect. Her behavior is normal. Judgment and thought content normal.   Nursing note and vitals reviewed.      Assessment:       1. Skin ulcers of both feet    2. Cutaneous scleroderma        Plan:           Medihoney gel to bilateral foot full-thickness ulcers and cover with hydrofiber.  Hydrofiber and gauze in between toes.  Pad left lateral foot with ABD pad for cushioning.  Cover with gauze and secure with roll gauze.  Return to clinic in 4 weeks.    Right dorsal foot      Left dorsal foot      Left medial second toe      Left lateral second toe      Left lateral ankle ulcers      Right medial foot      Right lateral ankle      Right medial ankle

## 2018-01-02 NOTE — PATIENT INSTRUCTIONS
Medihoney gel to bilateral foot full-thickness ulcers and cover with hydrofiber.  Hydrofiber and gauze in between toes.  Cover with gauze and secure with roll gauze.

## 2018-01-09 ENCOUNTER — HOSPITAL ENCOUNTER (OUTPATIENT)
Dept: RADIOLOGY | Facility: HOSPITAL | Age: 68
Discharge: HOME | End: 2018-01-09
Attending: INTERNAL MEDICINE
Payer: MEDICARE

## 2018-01-09 ENCOUNTER — TELEPHONE (OUTPATIENT)
Dept: INTERNAL MEDICINE | Facility: CLINIC | Age: 68
End: 2018-01-09

## 2018-01-09 ENCOUNTER — OFFICE VISIT (OUTPATIENT)
Dept: INTERNAL MEDICINE | Facility: CLINIC | Age: 68
End: 2018-01-09
Payer: MEDICARE

## 2018-01-09 VITALS
BODY MASS INDEX: 22.6 KG/M2 | WEIGHT: 140.63 LBS | HEART RATE: 84 BPM | DIASTOLIC BLOOD PRESSURE: 56 MMHG | SYSTOLIC BLOOD PRESSURE: 107 MMHG | TEMPERATURE: 98 F | HEIGHT: 66 IN

## 2018-01-09 DIAGNOSIS — J84.9 ILD (INTERSTITIAL LUNG DISEASE): ICD-10-CM

## 2018-01-09 DIAGNOSIS — R05.9 COUGH: ICD-10-CM

## 2018-01-09 DIAGNOSIS — R68.89 FLU-LIKE SYMPTOMS: Primary | ICD-10-CM

## 2018-01-09 DIAGNOSIS — I27.29 PULMONARY HYPERTENSION ASSOCIATED WITH SYSTEMIC DISORDER: Chronic | ICD-10-CM

## 2018-01-09 DIAGNOSIS — R09.82 POST-NASAL DRIP: ICD-10-CM

## 2018-01-09 LAB
FLUAV AG SPEC QL IA: NEGATIVE
FLUBV AG SPEC QL IA: NEGATIVE
SPECIMEN SOURCE: NORMAL

## 2018-01-09 PROCEDURE — 99213 OFFICE O/P EST LOW 20 MIN: CPT | Mod: PBBFAC,PO | Performed by: INTERNAL MEDICINE

## 2018-01-09 PROCEDURE — 99999 PR PBB SHADOW E&M-EST. PATIENT-LVL III: CPT | Mod: PBBFAC,,, | Performed by: INTERNAL MEDICINE

## 2018-01-09 PROCEDURE — 71046 X-RAY EXAM CHEST 2 VIEWS: CPT | Mod: 26,,, | Performed by: RADIOLOGY

## 2018-01-09 PROCEDURE — 71046 X-RAY EXAM CHEST 2 VIEWS: CPT | Mod: TC,PO

## 2018-01-09 PROCEDURE — 87400 INFLUENZA A/B EACH AG IA: CPT | Mod: PO

## 2018-01-09 PROCEDURE — 99214 OFFICE O/P EST MOD 30 MIN: CPT | Mod: S$PBB,,, | Performed by: INTERNAL MEDICINE

## 2018-01-09 NOTE — PROGRESS NOTES
"Subjective:       Patient ID: Yamel Shah is a 67 y.o. female.    Chief Complaint: Cough; Otalgia; Generalized Body Aches; Nasal Congestion; and Sore Throat    HPI   This is a new patient to me but established to [facility].      Pt went to  on [place] and diagnosed w/ bronchitis -given zpak. Maybe had some improvement. She still reports some wheezing. She has a lot of coughing. Cough is dry. Sore throat is improved. No fevers. She had body aches at first but denies any currently. She reports increased dyspnea and is concerned that when she went to  that she didn't get a CXR.     Review of Systems   Constitutional: Negative for chills and fever.   HENT: Positive for postnasal drip and sore throat. Negative for rhinorrhea and sinus pain.    Eyes: Negative for discharge and redness.   Respiratory: Positive for cough and wheezing.    Cardiovascular: Negative for chest pain.   Gastrointestinal: Negative for diarrhea, nausea and vomiting.   Musculoskeletal: Negative for arthralgias and myalgias.   Skin: Negative for rash.   Allergic/Immunologic: Negative for immunocompromised state.   Neurological: Negative for headaches.   Hematological: Negative for adenopathy.       Objective:        Vitals:    01/09/18 1554   BP: (!) 107/56   BP Location: Right arm   Patient Position: Sitting   BP Method: Medium (Automatic)   Pulse: 84   Temp: 97.6 °F (36.4 °C)   TempSrc: Oral   Weight: 63.8 kg (140 lb 10.5 oz)   Height: 5' 6" (1.676 m)       Body mass index is 22.7 kg/m².    Physical Exam   Constitutional: She is oriented to person, place, and time. She appears well-developed and well-nourished.   HENT:   Head: Normocephalic and atraumatic.   Right Ear: External ear normal.   Left Ear: External ear normal.   Nose: Mucosal edema and rhinorrhea present. Right sinus exhibits no maxillary sinus tenderness and no frontal sinus tenderness. Left sinus exhibits no maxillary sinus tenderness and no frontal sinus tenderness. "   Mouth/Throat: Posterior oropharyngeal erythema present. No posterior oropharyngeal edema. No tonsillar exudate.   Cobblestoning of posterior oropharynx     Eyes: Conjunctivae are normal. Right eye exhibits no discharge. Left eye exhibits no discharge. Right conjunctiva is not injected. Left conjunctiva is not injected.   Neck: Normal range of motion.   Cardiovascular: Normal rate, regular rhythm, normal heart sounds and intact distal pulses.    Pulmonary/Chest: Effort normal. No respiratory distress. She has wheezes.   Abdominal: Soft. Bowel sounds are normal.   Lymphadenopathy:     She has no cervical adenopathy.   Neurological: She is alert and oriented to person, place, and time.   Skin: Skin is warm and dry. No rash noted.   Psychiatric: She has a normal mood and affect.       Assessment:     1. Flu-like symptoms    2. Cough    3. ILD (interstitial lung disease)    4. Pulmonary hypertension associated with systemic disorder    5. Post-nasal drip           Plan:         1. Flu-like symptoms  - given high risk pt will eval for flu despite body aches now being resolved. Will treat based on results.  - Influenza antigen Nasopharyngeal Swab    2. Cough  - suspect d/t post nasal drip. Discussed antihistamine, nasal steroid spray, nasal saline rinse. Okay to continue tessalon perles, albuterol inhaler  - X-Ray Chest PA And Lateral; Future    3. ILD (interstitial lung disease)  - stable, continue meds  - X-Ray Chest PA And Lateral; Future    4. Pulmonary hypertension associated with systemic disorder  - stable, managed by cardiology    5. Post-nasal drip  - discussed otc symptomatic treatment: antihistamine, nasal steroid spray, nasal saline rinse; chloraseptic spray for sore throat; dextromethorphan to suppress coughing.

## 2018-01-10 ENCOUNTER — TELEPHONE (OUTPATIENT)
Dept: INTERNAL MEDICINE | Facility: CLINIC | Age: 68
End: 2018-01-10

## 2018-01-10 NOTE — TELEPHONE ENCOUNTER
----- Message from Keisha Burgos MD sent at 1/10/2018  7:43 AM CST -----  No pneumonia seen on chest x-ray.

## 2018-01-25 RX ORDER — GABAPENTIN 300 MG/1
CAPSULE ORAL
Qty: 270 CAPSULE | Refills: 0 | Status: SHIPPED | OUTPATIENT
Start: 2018-01-25 | End: 2018-02-21 | Stop reason: SDUPTHER

## 2018-02-06 ENCOUNTER — OFFICE VISIT (OUTPATIENT)
Dept: WOUND CARE | Facility: CLINIC | Age: 68
End: 2018-02-06
Payer: MEDICARE

## 2018-02-06 VITALS
TEMPERATURE: 97 F | WEIGHT: 142.69 LBS | BODY MASS INDEX: 22.93 KG/M2 | DIASTOLIC BLOOD PRESSURE: 54 MMHG | HEART RATE: 80 BPM | SYSTOLIC BLOOD PRESSURE: 100 MMHG | HEIGHT: 66 IN

## 2018-02-06 DIAGNOSIS — L97.519 SKIN ULCERS OF BOTH FEET: Primary | ICD-10-CM

## 2018-02-06 DIAGNOSIS — M34.9 SCLERODERMA: ICD-10-CM

## 2018-02-06 DIAGNOSIS — L97.529 SKIN ULCERS OF BOTH FEET: Primary | ICD-10-CM

## 2018-02-06 PROCEDURE — 1159F MED LIST DOCD IN RCRD: CPT | Mod: ,,, | Performed by: NURSE PRACTITIONER

## 2018-02-06 PROCEDURE — 1125F AMNT PAIN NOTED PAIN PRSNT: CPT | Mod: ,,, | Performed by: NURSE PRACTITIONER

## 2018-02-06 PROCEDURE — 99999 PR PBB SHADOW E&M-EST. PATIENT-LVL V: CPT | Mod: PBBFAC,,, | Performed by: NURSE PRACTITIONER

## 2018-02-06 PROCEDURE — 99215 OFFICE O/P EST HI 40 MIN: CPT | Mod: PBBFAC | Performed by: NURSE PRACTITIONER

## 2018-02-06 PROCEDURE — 99212 OFFICE O/P EST SF 10 MIN: CPT | Mod: S$PBB,,, | Performed by: NURSE PRACTITIONER

## 2018-02-06 NOTE — PROGRESS NOTES
Subjective:       Patient ID: Yamel Shah is a 67 y.o. female.    Chief Complaint: No chief complaint on file.    Wound Check     This patient is seen today for reevaluation of ulcers to both feet.  The ulcers on the left foot started in January 2015 and the one on the right foot began the end of March 2015.  She is using iodosorb gel daily on the wounds and covering with a hydrofiber rope dressing.  She is unable to tolerate the iodosorb due to excessive burning.  The wounds are healing very slowly.  All of her wounds are full-thickness.  In the past she has had pulsed Veletri infusions which have helped temporarily.  She is afebrile.  She denies increased swelling, redness or purulent drainage.  Her wound healing is complicated by scleroderma and venous insufficiency.  Her pain level is 8/10.  Review of Systems   Constitutional: Negative for chills, diaphoresis and fever.   HENT: Positive for tinnitus (left hear). Negative for hearing loss, postnasal drip, rhinorrhea, sinus pressure, sneezing, sore throat and trouble swallowing.    Eyes: Negative for visual disturbance.   Respiratory: Negative for cough, shortness of breath and wheezing.    Cardiovascular: Negative for chest pain, palpitations and leg swelling.   Gastrointestinal: Negative for constipation, diarrhea, nausea and vomiting.   Genitourinary: Negative for difficulty urinating, dysuria and hematuria.        Urinary incontinence   Musculoskeletal: Negative for arthralgias, back pain and joint swelling.   Skin: Positive for wound.   Neurological: Negative for dizziness, light-headedness and headaches.   Hematological: Bruises/bleeds easily.   Psychiatric/Behavioral: Positive for sleep disturbance. Negative for decreased concentration. The patient is not nervous/anxious.        Objective:      Physical Exam   Constitutional: She is oriented to person, place, and time. She appears well-developed and well-nourished. No distress.   HENT:   Head:  Normocephalic and atraumatic.   Neck: Normal range of motion.   Cardiovascular: Intact distal pulses.    Pulmonary/Chest: Effort normal. No respiratory distress.   Musculoskeletal: Normal range of motion. She exhibits no edema or tenderness.        Feet:    Neurological: She is alert and oriented to person, place, and time.   Skin: Skin is warm and dry. No rash noted. She is not diaphoretic. No cyanosis or erythema. Nails show no clubbing.   Psychiatric: She has a normal mood and affect. Her behavior is normal. Judgment and thought content normal.   Nursing note and vitals reviewed.      Assessment:       1. Skin ulcers of both feet    2. Scleroderma        Plan:           Medihoney gel to bilateral foot full-thickness ulcers and cover with hydrofiber.  Hydrofiber and gauze in between toes.  Pad left lateral foot with ABD pad for cushioning.  Cover with gauze and secure with roll gauze.  Return to clinic in 4 weeks.    Right dorsal foot      Left dorsal/lateral foot        Left medial second toe      Right lateral ankle      Right medial ankle

## 2018-02-06 NOTE — PATIENT INSTRUCTIONS
Medihoney gel to bilateral foot full-thickness ulcers and cover with hydrofiber.  Hydrofiber and gauze in between toes.  Pad left lateral foot with ABD pad for cushioning.  Cover with gauze and secure with roll gauze.

## 2018-02-08 RX ORDER — TADALAFIL 20 MG/1
TABLET ORAL
Qty: 180 TABLET | Refills: 2 | Status: SHIPPED | OUTPATIENT
Start: 2018-02-08 | End: 2018-02-21 | Stop reason: SDUPTHER

## 2018-02-21 ENCOUNTER — HOSPITAL ENCOUNTER (INPATIENT)
Facility: HOSPITAL | Age: 68
LOS: 1 days | Discharge: HOME | DRG: 546 | End: 2018-02-22
Attending: EMERGENCY MEDICINE | Admitting: EMERGENCY MEDICINE
Payer: MEDICARE

## 2018-02-21 ENCOUNTER — OFFICE VISIT (OUTPATIENT)
Dept: RHEUMATOLOGY | Facility: CLINIC | Age: 68
DRG: 546 | End: 2018-02-21
Payer: MEDICARE

## 2018-02-21 VITALS
WEIGHT: 143.81 LBS | BODY MASS INDEX: 24.55 KG/M2 | HEART RATE: 82 BPM | SYSTOLIC BLOOD PRESSURE: 114 MMHG | DIASTOLIC BLOOD PRESSURE: 52 MMHG | HEIGHT: 64 IN

## 2018-02-21 DIAGNOSIS — E78.5 HYPERLIPIDEMIA, UNSPECIFIED HYPERLIPIDEMIA TYPE: ICD-10-CM

## 2018-02-21 DIAGNOSIS — J84.9 ILD (INTERSTITIAL LUNG DISEASE): ICD-10-CM

## 2018-02-21 DIAGNOSIS — K21.9 GASTROESOPHAGEAL REFLUX DISEASE, ESOPHAGITIS PRESENCE NOT SPECIFIED: ICD-10-CM

## 2018-02-21 DIAGNOSIS — R79.9 ABNORMAL FINDING OF BLOOD CHEMISTRY: ICD-10-CM

## 2018-02-21 DIAGNOSIS — M89.9 DISORDER OF BONE: ICD-10-CM

## 2018-02-21 DIAGNOSIS — I87.2 VENOUS INSUFFICIENCY OF BOTH LOWER EXTREMITIES: ICD-10-CM

## 2018-02-21 DIAGNOSIS — E46 PROTEIN-CALORIE MALNUTRITION, UNSPECIFIED SEVERITY: ICD-10-CM

## 2018-02-21 DIAGNOSIS — D64.9 ANEMIA: ICD-10-CM

## 2018-02-21 DIAGNOSIS — I27.20 PULMONARY HYPERTENSION: ICD-10-CM

## 2018-02-21 DIAGNOSIS — M34.9 SCLERODERMA: Primary | ICD-10-CM

## 2018-02-21 LAB
ABO + RH BLD: NORMAL
BASOPHILS # BLD AUTO: 0.04 K/UL
BASOPHILS NFR BLD: 1 %
BILIRUB UR QL STRIP: NEGATIVE
BLD GP AB SCN CELLS X3 SERPL QL: NORMAL
CLARITY UR REFRACT.AUTO: CLEAR
COLOR UR AUTO: NORMAL
DIFFERENTIAL METHOD: ABNORMAL
EOSINOPHIL # BLD AUTO: 0.1 K/UL
EOSINOPHIL NFR BLD: 2.4 %
ERYTHROCYTE [DISTWIDTH] IN BLOOD BY AUTOMATED COUNT: 25 %
GLUCOSE UR QL STRIP: NEGATIVE
HCT VFR BLD AUTO: 23.8 %
HGB BLD-MCNC: 6.9 G/DL
HGB UR QL STRIP: NEGATIVE
IMM GRANULOCYTES # BLD AUTO: 0.01 K/UL
IMM GRANULOCYTES NFR BLD AUTO: 0.2 %
KETONES UR QL STRIP: NEGATIVE
LEUKOCYTE ESTERASE UR QL STRIP: NEGATIVE
LYMPHOCYTES # BLD AUTO: 1.4 K/UL
LYMPHOCYTES NFR BLD: 34 %
MCH RBC QN AUTO: 21.3 PG
MCHC RBC AUTO-ENTMCNC: 29 G/DL
MCV RBC AUTO: 74 FL
MONOCYTES # BLD AUTO: 0.4 K/UL
MONOCYTES NFR BLD: 10.2 %
NEUTROPHILS # BLD AUTO: 2.2 K/UL
NEUTROPHILS NFR BLD: 52.2 %
NITRITE UR QL STRIP: NEGATIVE
NRBC BLD-RTO: 0 /100 WBC
PH UR STRIP: 6 [PH] (ref 5–8)
PLATELET # BLD AUTO: 313 K/UL
PMV BLD AUTO: 10.3 FL
PROT UR QL STRIP: NEGATIVE
RBC # BLD AUTO: 3.24 M/UL
SP GR UR STRIP: 1 (ref 1–1.03)
URN SPEC COLLECT METH UR: NORMAL
UROBILINOGEN UR STRIP-ACNC: NEGATIVE EU/DL
WBC # BLD AUTO: 4.12 K/UL

## 2018-02-21 PROCEDURE — 99214 OFFICE O/P EST MOD 30 MIN: CPT | Mod: S$PBB,GC,, | Performed by: INTERNAL MEDICINE

## 2018-02-21 PROCEDURE — 99215 OFFICE O/P EST HI 40 MIN: CPT | Mod: PBBFAC | Performed by: INTERNAL MEDICINE

## 2018-02-21 PROCEDURE — 86860 RBC ANTIBODY ELUTION: CPT

## 2018-02-21 PROCEDURE — 86880 COOMBS TEST DIRECT: CPT

## 2018-02-21 PROCEDURE — 85025 COMPLETE CBC W/AUTO DIFF WBC: CPT

## 2018-02-21 PROCEDURE — 86922 COMPATIBILITY TEST ANTIGLOB: CPT

## 2018-02-21 PROCEDURE — 1159F MED LIST DOCD IN RCRD: CPT | Mod: GC,,, | Performed by: INTERNAL MEDICINE

## 2018-02-21 PROCEDURE — 20600001 HC STEP DOWN PRIVATE ROOM

## 2018-02-21 PROCEDURE — 86850 RBC ANTIBODY SCREEN: CPT

## 2018-02-21 PROCEDURE — 99999 PR PBB SHADOW E&M-EST. PATIENT-LVL V: CPT | Mod: PBBFAC,GC,, | Performed by: INTERNAL MEDICINE

## 2018-02-21 PROCEDURE — 81003 URINALYSIS AUTO W/O SCOPE: CPT

## 2018-02-21 PROCEDURE — 99285 EMERGENCY DEPT VISIT HI MDM: CPT | Mod: 27

## 2018-02-21 PROCEDURE — 1125F AMNT PAIN NOTED PAIN PRSNT: CPT | Mod: GC,,, | Performed by: INTERNAL MEDICINE

## 2018-02-21 PROCEDURE — 25000003 PHARM REV CODE 250: Performed by: STUDENT IN AN ORGANIZED HEALTH CARE EDUCATION/TRAINING PROGRAM

## 2018-02-21 PROCEDURE — 12000002 HC ACUTE/MED SURGE SEMI-PRIVATE ROOM

## 2018-02-21 PROCEDURE — 86905 BLOOD TYPING RBC ANTIGENS: CPT

## 2018-02-21 PROCEDURE — 86978 RBC PRETREATMENT SERUM: CPT

## 2018-02-21 PROCEDURE — 86870 RBC ANTIBODY IDENTIFICATION: CPT

## 2018-02-21 RX ORDER — GABAPENTIN 300 MG/1
CAPSULE ORAL
Qty: 360 CAPSULE | Refills: 4 | Status: SHIPPED | OUTPATIENT
Start: 2018-02-21 | End: 2018-04-04 | Stop reason: SDUPTHER

## 2018-02-21 RX ORDER — BOSENTAN 62.5 MG/1
62.5 TABLET, FILM COATED ORAL EVERY 12 HOURS
Qty: 60 TABLET | Refills: 2 | Status: SHIPPED | OUTPATIENT
Start: 2018-02-21 | End: 2018-02-21 | Stop reason: ALTCHOICE

## 2018-02-21 RX ORDER — ERGOCALCIFEROL 1.25 MG/1
50000 CAPSULE ORAL
Qty: 12 CAPSULE | Refills: 0 | Status: SHIPPED | OUTPATIENT
Start: 2018-02-21 | End: 2018-05-25 | Stop reason: SDUPTHER

## 2018-02-21 RX ORDER — TADALAFIL 20 MG/1
TABLET ORAL
Qty: 180 TABLET | Refills: 2 | Status: SHIPPED | OUTPATIENT
Start: 2018-02-21 | End: 2018-03-22 | Stop reason: SDUPTHER

## 2018-02-21 RX ORDER — PRAVASTATIN SODIUM 20 MG/1
20 TABLET ORAL NIGHTLY
Qty: 90 TABLET | Refills: 3 | Status: SHIPPED | OUTPATIENT
Start: 2018-02-21 | End: 2019-07-05

## 2018-02-21 RX ORDER — NIFEDIPINE 90 MG/1
TABLET, FILM COATED, EXTENDED RELEASE ORAL
Qty: 90 TABLET | Refills: 3 | Status: SHIPPED | OUTPATIENT
Start: 2018-02-21 | End: 2020-01-11 | Stop reason: SDUPTHER

## 2018-02-21 RX ORDER — HYDROCODONE BITARTRATE AND ACETAMINOPHEN 500; 5 MG/1; MG/1
TABLET ORAL
Status: DISCONTINUED | OUTPATIENT
Start: 2018-02-21 | End: 2018-02-22 | Stop reason: HOSPADM

## 2018-02-21 RX ORDER — GABAPENTIN 300 MG/1
300 CAPSULE ORAL
Status: COMPLETED | OUTPATIENT
Start: 2018-02-21 | End: 2018-02-21

## 2018-02-21 RX ADMIN — GABAPENTIN 300 MG: 300 CAPSULE ORAL at 11:02

## 2018-02-21 ASSESSMENT — ROUTINE ASSESSMENT OF PATIENT INDEX DATA (RAPID3)
TOTAL RAPID3 SCORE: 5.72
PAIN SCORE: 8
AM STIFFNESS SCORE: 0, NO
MDHAQ FUNCTION SCORE: 1.1
FATIGUE SCORE: 5
PSYCHOLOGICAL DISTRESS SCORE: 3.3
PATIENT GLOBAL ASSESSMENT SCORE: 5.5

## 2018-02-21 NOTE — PROGRESS NOTES
Subjective:       Patient ID: Yamel Shah is a 67 y.o. female.    Chief Complaint: No chief complaint on file.    HPI   68 yo F for follow-up for systemic scleroderma, diagnosed 1983- She has been seeing Dr. Ahuja for over 10 years. She is +SCL-70, -RNAP3. She has stable ILD on imaging 9/2015 and had mild exercise induced pulmonary hypertension in 2013 that has resolved on 6/2015 RHC and repeat echo. PFTs 4/2016 show normal DLCO, RV, FEV1. Mild restriction.     Pt has not been in clinic since 1/2017. She reports that her b/l wounds are still active and are causing her the most problems. Pt denies any joint pains/swelling, denies hemoptysis.. She feels well but still has difficulty swallowing at times. She has not seen GI since 2016 but was suppose to f/u post EGD. She notes weight loss as well.   In the past pt's digital ulcerations required admission and treatment with epoprostenol (6/2015 and Sept/2016). She does not feel epo helped with the wounds.   She is on  adcirca and nifedipine 90mg daily. She did not qualify for Bosentan in the past.    She also saw vascular surgery in the past who will consider EVLT once ulcers have healed.      Her breathing is unchanged but she feels her skin has improved.   Pt has seen Dr. Lim recently and is stable, no change in medications, planning for repeat echo this summer.      Review of Systems   Constitutional: Positive for unexpected weight change. Negative for appetite change, chills, fatigue and fever.   HENT: Positive for facial swelling and trouble swallowing. Negative for mouth sores, nosebleeds and sore throat.         Dry mouth     Eyes: Negative for photophobia, pain and visual disturbance.   Respiratory: Negative for cough, choking, chest tightness and shortness of breath.    Cardiovascular: Negative for chest pain, palpitations and leg swelling.   Musculoskeletal: Negative for arthralgias, back pain, gait problem and joint swelling.   Skin: Positive  "for color change and wound. Negative for pallor and rash.   Neurological: Negative for tremors, weakness, light-headedness, numbness and headaches.         Objective:   BP (!) 114/52   Pulse 82   Ht 5' 3.6" (1.615 m)   Wt 65.2 kg (143 lb 12.8 oz)   BMI 24.99 kg/m²      Physical Exam   Constitutional: She is well-developed, well-nourished, and in no distress.   HENT:   Mouth/Throat: Oropharynx is clear and moist.   Eyes: Conjunctivae are normal. No scleral icterus.   Neck: Normal range of motion. Neck supple.   Cardiovascular: Normal rate, normal heart sounds and intact distal pulses.    Pulmonary/Chest: Effort normal. She has rales.   Abdominal: Soft.   Skin: Skin is warm and dry. No rash noted. No erythema.     MRSS 20  Telangiectasias on the face and upper chest wall  Skin tightening b/l  Claw hand deformity. Flexion contractures b/l.   Feet bandaged---see wound care pictures.    Musculoskeletal: She exhibits deformity. She exhibits no edema or tenderness.           Labs:   Results for orders placed or performed in visit on 06/27/17   CBC auto differential   Result Value Ref Range    WBC 5.35 3.90 - 12.70 K/uL    RBC 3.92 (L) 4.00 - 5.40 M/uL    Hemoglobin 8.1 (L) 12.0 - 16.0 g/dL    Hematocrit 27.9 (L) 37.0 - 48.5 %    MCV 71 (L) 82 - 98 fL    MCH 20.7 (L) 27.0 - 31.0 pg    MCHC 29.0 (L) 32.0 - 36.0 %    RDW 18.1 (H) 11.5 - 14.5 %    Platelets 309 150 - 350 K/uL    MPV 10.4 9.2 - 12.9 fL    Gran # (ANC) 3.7 1.8 - 7.7 K/uL    Lymph # 1.1 1.0 - 4.8 K/uL    Mono # 0.5 0.3 - 1.0 K/uL    Eos # 0.1 0.0 - 0.5 K/uL    Baso # 0.03 0.00 - 0.20 K/uL    Gran% 69.1 38.0 - 73.0 %    Lymph% 19.8 18.0 - 48.0 %    Mono% 9.0 4.0 - 15.0 %    Eosinophil% 1.3 0.0 - 8.0 %    Basophil% 0.6 0.0 - 1.9 %    Differential Method Automated      Results for orders placed or performed in visit on 06/27/17   Comprehensive metabolic panel   Result Value Ref Range    Sodium 140 136 - 145 mmol/L    Potassium 3.8 3.5 - 5.1 mmol/L    Chloride " 110 95 - 110 mmol/L    CO2 23 23 - 29 mmol/L    Glucose 99 70 - 110 mg/dL    BUN, Bld 12 8 - 23 mg/dL    Creatinine 0.7 0.5 - 1.4 mg/dL    Calcium 8.5 (L) 8.7 - 10.5 mg/dL    Total Protein 8.4 6.0 - 8.4 g/dL    Albumin 3.3 (L) 3.5 - 5.2 g/dL    Total Bilirubin 0.3 0.1 - 1.0 mg/dL    Alkaline Phosphatase 118 55 - 135 U/L    AST 17 10 - 40 U/L    ALT 8 (L) 10 - 44 U/L    Anion Gap 7 (L) 8 - 16 mmol/L    eGFR if African American >60.0 >60 mL/min/1.73 m^2    eGFR if non African American >60.0 >60 mL/min/1.73 m^2     Results for orders placed or performed in visit on 06/01/16   Vitamin D   Result Value Ref Range    Vit D, 25-Hydroxy 53 30 - 96 ng/mL     No results found for this or any previous visit.  No results found for this or any previous visit.  Results for orders placed or performed in visit on 01/13/17   Sedimentation rate, manual   Result Value Ref Range    Sed Rate 23 (H) 0 - 20 mm/Hr     Results for orders placed or performed in visit on 01/13/17   C-reactive protein   Result Value Ref Range    CRP 5.2 0.0 - 8.2 mg/L       Results for orders placed or performed in visit on 11/10/09   Sjogrens syndrome-A extractable nuclear antibody   Result Value Ref Range    Anti-SSA Antibody 181.22 (A) EU    Anti-SSA Interpretation Positive (A)      Results for orders placed or performed in visit on 11/10/09   Sjogrens syndrome-B extractable nuclear antibody   Result Value Ref Range    Anti-SSB Antibody 16.46 EU    Anti-SSB Interpretation Negative      Results for orders placed or performed in visit on 11/10/09   Anti-DNA antibody, double-stranded   Result Value Ref Range    ds DNA Ab Negative Negative Titer     Results for orders placed or performed in visit on 11/11/13   Urinalysis   Result Value Ref Range    Specimen UA Urine, Unspecified     Color, UA Straw Yellow, Straw, [place]    Appearance, UA Clear Clear    pH, UA 7.0 5.0 - 8.0    Specific Gravity, UA 1.010 1.005 - 1.030    Protein, UA Negative Negative    Glucose, UA  Negative Negative    Ketones, UA Negative Negative    Bilirubin (UA) Negative Negative    Occult Blood UA Negative Negative    Nitrite, UA Negative Negative    Urobilinogen, UA Negative <2.0 EU/dL    Leukocytes, UA Negative Negative     Results for orders placed or performed in visit on 11/15/10   Protein / creatinine ratio, urine   Result Value Ref Range    Protein, Urine Random 25 mg/dl    Creatinine, Random Ur 281 15 - 325 mg/dl    Prot/Creat Ratio, Ur 0.09 0.05 - 1.07       Imaging reviewed.      Assessment:       1. Scleroderma    2. Venous insufficiency of both lower extremities    3. Gastroesophageal reflux disease, esophagitis presence not specified    4. ILD (interstitial lung disease)    5. Abnormal finding of blood chemistry     6. Protein-calorie malnutrition, unspecified severity     7. Disorder of bone     8. Pulmonary hypertension    9. Hyperlipidemia, unspecified hyperlipidemia type             Scleroderma (GERD, +telangiectasias, ILD, exercise induced pHTN (resolved 6/2015 RHC), Raynaud's, sclerodactyly with claw hand) Skin score 20 . Skin, pulmonary all stable today. Foot ulcers persistent.  She follows closely with wound care.  Scleroderma stable.   She was unable to have EVLT due to persistent wounds    Exercise induced Pulmonary hypertension RHC 1/13- resolved on RHC 6/2015. Doesn't qualify for Bosentan. She is on Adcirca 40mg daily.       ILD (interstitial lung disease) - PFTS 4/2016 with normal DLCO, FEV1 and RV. 9/2015 HRCT stable ILD from 2007.     GERD (gastroesophageal reflux disease) - on PPI once daily with persistent symptoms    Vitamin D insufficiency- on vitamin D weekly    Claw hand, unspecified laterality    .  Osteopenia- due for repeat dexa   Microcytic anemia- will obtain CBC and anemia labs as she has not had them in some time.   HLD- statin refilled.   Baseline EKG done.     Plan:   - Continue with adcira 40 mg daily  - Repeat PFTs prior to next visit along with DEXA  -  Referral back to GI given ongoing trouble swallowing   - Given persistent ulcerations will ask for repeat vascular evaluation for EVLT to assist with wound healing as noted with above article.   - Pt needs Tdap vaccination  - Standing scleroderma labs plus lipids  - given Pulmonary hypertension will send Rx for Bosentan to [facility].   - continue wound care.   - repeat Echo per Dr. Lim this summer, last was normal.       RTC in 3 months.         --------Addendum:   Labs returned today with significant iron deficiency  anemia. Pt notified to go to ED for evaluation for anemia.

## 2018-02-22 ENCOUNTER — TELEPHONE (OUTPATIENT)
Dept: GASTROENTEROLOGY | Facility: CLINIC | Age: 68
End: 2018-02-22

## 2018-02-22 VITALS
TEMPERATURE: 98 F | OXYGEN SATURATION: 99 % | SYSTOLIC BLOOD PRESSURE: 159 MMHG | HEART RATE: 63 BPM | DIASTOLIC BLOOD PRESSURE: 70 MMHG | RESPIRATION RATE: 16 BRPM

## 2018-02-22 DIAGNOSIS — D50.8 OTHER IRON DEFICIENCY ANEMIA: Primary | ICD-10-CM

## 2018-02-22 PROBLEM — D64.9 SYMPTOMATIC ANEMIA: Status: ACTIVE | Noted: 2018-02-22

## 2018-02-22 LAB
ANION GAP SERPL CALC-SCNC: 8 MMOL/L
BASOPHILS # BLD AUTO: 0.04 K/UL
BASOPHILS # BLD AUTO: 0.04 K/UL
BASOPHILS NFR BLD: 0.9 %
BASOPHILS NFR BLD: 1.1 %
BLD PROD TYP BPU: NORMAL
BLD PROD TYP BPU: NORMAL
BLOOD GROUP ANTIBODIES SERPL: NORMAL
BLOOD UNIT EXPIRATION DATE: NORMAL
BLOOD UNIT EXPIRATION DATE: NORMAL
BLOOD UNIT TYPE CODE: 5100
BLOOD UNIT TYPE CODE: 5100
BLOOD UNIT TYPE: NORMAL
BLOOD UNIT TYPE: NORMAL
BUN SERPL-MCNC: 15 MG/DL
CALCIUM SERPL-MCNC: 8.5 MG/DL
CHLORIDE SERPL-SCNC: 109 MMOL/L
CO2 SERPL-SCNC: 22 MMOL/L
CODING SYSTEM: NORMAL
CODING SYSTEM: NORMAL
CREAT SERPL-MCNC: 0.7 MG/DL
DAT IGG-SP REAG RBC-IMP: NORMAL
DIFFERENTIAL METHOD: ABNORMAL
DIFFERENTIAL METHOD: ABNORMAL
DISPENSE STATUS: NORMAL
DISPENSE STATUS: NORMAL
EOSINOPHIL # BLD AUTO: 0.1 K/UL
EOSINOPHIL # BLD AUTO: 0.1 K/UL
EOSINOPHIL NFR BLD: 1.5 %
EOSINOPHIL NFR BLD: 3.3 %
ERYTHROCYTE [DISTWIDTH] IN BLOOD BY AUTOMATED COUNT: 23.8 %
ERYTHROCYTE [DISTWIDTH] IN BLOOD BY AUTOMATED COUNT: 25.5 %
EST. GFR  (AFRICAN AMERICAN): >60 ML/MIN/1.73 M^2
EST. GFR  (NON AFRICAN AMERICAN): >60 ML/MIN/1.73 M^2
GIANT PLATELETS BLD QL SMEAR: PRESENT
GLUCOSE SERPL-MCNC: 85 MG/DL
HAPTOGLOB SERPL-MCNC: 122 MG/DL
HCT VFR BLD AUTO: 20.6 %
HCT VFR BLD AUTO: 30.3 %
HGB BLD-MCNC: 5.9 G/DL
HGB BLD-MCNC: 9 G/DL
IMM GRANULOCYTES # BLD AUTO: 0.01 K/UL
IMM GRANULOCYTES # BLD AUTO: 0.02 K/UL
IMM GRANULOCYTES NFR BLD AUTO: 0.3 %
IMM GRANULOCYTES NFR BLD AUTO: 0.4 %
LDH SERPL L TO P-CCNC: 161 U/L
LYMPHOCYTES # BLD AUTO: 1.2 K/UL
LYMPHOCYTES # BLD AUTO: 1.5 K/UL
LYMPHOCYTES NFR BLD: 24.9 %
LYMPHOCYTES NFR BLD: 39.7 %
MCH RBC QN AUTO: 20.7 PG
MCH RBC QN AUTO: 21.5 PG
MCHC RBC AUTO-ENTMCNC: 28.6 G/DL
MCHC RBC AUTO-ENTMCNC: 29.7 G/DL
MCV RBC AUTO: 72 FL
MCV RBC AUTO: 73 FL
MONOCYTES # BLD AUTO: 0.4 K/UL
MONOCYTES # BLD AUTO: 0.5 K/UL
MONOCYTES NFR BLD: 10.7 %
MONOCYTES NFR BLD: 9.9 %
NEUTROPHILS # BLD AUTO: 1.6 K/UL
NEUTROPHILS # BLD AUTO: 2.9 K/UL
NEUTROPHILS NFR BLD: 44.9 %
NEUTROPHILS NFR BLD: 62.4 %
NRBC BLD-RTO: 0 /100 WBC
NRBC BLD-RTO: 0 /100 WBC
NUM UNITS TRANS PACKED RBC: NORMAL
PLATELET # BLD AUTO: 276 K/UL
PLATELET # BLD AUTO: 292 K/UL
PLATELET BLD QL SMEAR: ABNORMAL
PMV BLD AUTO: 10.1 FL
PMV BLD AUTO: 10.6 FL
POTASSIUM SERPL-SCNC: 4.3 MMOL/L
RBC # BLD AUTO: 2.85 M/UL
RBC # BLD AUTO: 4.18 M/UL
SODIUM SERPL-SCNC: 139 MMOL/L
TRANS ERYTHROCYTES VOL PATIENT: NORMAL ML
WBC # BLD AUTO: 3.65 K/UL
WBC # BLD AUTO: 4.65 K/UL

## 2018-02-22 PROCEDURE — 25000003 PHARM REV CODE 250: Performed by: STUDENT IN AN ORGANIZED HEALTH CARE EDUCATION/TRAINING PROGRAM

## 2018-02-22 PROCEDURE — 80048 BASIC METABOLIC PNL TOTAL CA: CPT

## 2018-02-22 PROCEDURE — 86902 BLOOD TYPE ANTIGEN DONOR EA: CPT | Mod: 59

## 2018-02-22 PROCEDURE — 85025 COMPLETE CBC W/AUTO DIFF WBC: CPT | Mod: 91

## 2018-02-22 PROCEDURE — 83010 ASSAY OF HAPTOGLOBIN QUANT: CPT

## 2018-02-22 PROCEDURE — 83615 LACTATE (LD) (LDH) ENZYME: CPT

## 2018-02-22 PROCEDURE — P9021 RED BLOOD CELLS UNIT: HCPCS

## 2018-02-22 PROCEDURE — P9016 RBC LEUKOCYTES REDUCED: HCPCS

## 2018-02-22 PROCEDURE — 36430 TRANSFUSION BLD/BLD COMPNT: CPT

## 2018-02-22 PROCEDURE — 99223 1ST HOSP IP/OBS HIGH 75: CPT | Mod: AI,GC,, | Performed by: HOSPITALIST

## 2018-02-22 PROCEDURE — 99223 1ST HOSP IP/OBS HIGH 75: CPT | Mod: GC,,, | Performed by: INTERNAL MEDICINE

## 2018-02-22 PROCEDURE — 36415 COLL VENOUS BLD VENIPUNCTURE: CPT

## 2018-02-22 RX ORDER — TADALAFIL 20 MG/1
20 TABLET ORAL DAILY
Status: DISCONTINUED | OUTPATIENT
Start: 2018-02-22 | End: 2018-02-22 | Stop reason: HOSPADM

## 2018-02-22 RX ORDER — NIFEDIPINE 30 MG/1
90 TABLET, EXTENDED RELEASE ORAL DAILY
Status: DISCONTINUED | OUTPATIENT
Start: 2018-02-22 | End: 2018-02-22 | Stop reason: HOSPADM

## 2018-02-22 RX ORDER — FERROUS SULFATE 325(65) MG
325 TABLET, DELAYED RELEASE (ENTERIC COATED) ORAL 3 TIMES DAILY
Status: DISCONTINUED | OUTPATIENT
Start: 2018-02-22 | End: 2018-02-22 | Stop reason: HOSPADM

## 2018-02-22 RX ORDER — ACETAMINOPHEN 325 MG/1
650 TABLET ORAL EVERY 8 HOURS PRN
Status: DISCONTINUED | OUTPATIENT
Start: 2018-02-22 | End: 2018-02-22 | Stop reason: HOSPADM

## 2018-02-22 RX ORDER — AMOXICILLIN 250 MG
1 CAPSULE ORAL DAILY
Status: DISCONTINUED | OUTPATIENT
Start: 2018-02-22 | End: 2018-02-22 | Stop reason: HOSPADM

## 2018-02-22 RX ORDER — HYDROCODONE BITARTRATE AND ACETAMINOPHEN 500; 5 MG/1; MG/1
TABLET ORAL
Status: DISCONTINUED | OUTPATIENT
Start: 2018-02-22 | End: 2018-02-22 | Stop reason: HOSPADM

## 2018-02-22 RX ORDER — ALBUTEROL SULFATE 90 UG/1
2 AEROSOL, METERED RESPIRATORY (INHALATION) EVERY 4 HOURS PRN
Status: DISCONTINUED | OUTPATIENT
Start: 2018-02-22 | End: 2018-02-22 | Stop reason: HOSPADM

## 2018-02-22 RX ORDER — PANTOPRAZOLE SODIUM 40 MG/1
40 TABLET, DELAYED RELEASE ORAL DAILY
Status: DISCONTINUED | OUTPATIENT
Start: 2018-02-22 | End: 2018-02-22 | Stop reason: HOSPADM

## 2018-02-22 RX ORDER — PRAVASTATIN SODIUM 20 MG/1
20 TABLET ORAL NIGHTLY
Status: DISCONTINUED | OUTPATIENT
Start: 2018-02-22 | End: 2018-02-22 | Stop reason: HOSPADM

## 2018-02-22 RX ORDER — GABAPENTIN 300 MG/1
300 CAPSULE ORAL 3 TIMES DAILY
Status: DISCONTINUED | OUTPATIENT
Start: 2018-02-22 | End: 2018-02-22

## 2018-02-22 RX ORDER — FERROUS SULFATE 325(65) MG
325 TABLET, DELAYED RELEASE (ENTERIC COATED) ORAL 3 TIMES DAILY
Refills: 0 | COMMUNITY
Start: 2018-02-22 | End: 2020-01-17

## 2018-02-22 RX ORDER — GABAPENTIN 100 MG/1
200 CAPSULE ORAL 3 TIMES DAILY
Status: DISCONTINUED | OUTPATIENT
Start: 2018-02-22 | End: 2018-02-22 | Stop reason: HOSPADM

## 2018-02-22 RX ADMIN — GABAPENTIN 200 MG: 100 CAPSULE ORAL at 02:02

## 2018-02-22 RX ADMIN — PANTOPRAZOLE SODIUM 40 MG: 40 TABLET, DELAYED RELEASE ORAL at 10:02

## 2018-02-22 RX ADMIN — PRAVASTATIN SODIUM 20 MG: 20 TABLET ORAL at 01:02

## 2018-02-22 RX ADMIN — STANDARDIZED SENNA CONCENTRATE AND DOCUSATE SODIUM 1 TABLET: 8.6; 5 TABLET, FILM COATED ORAL at 10:02

## 2018-02-22 RX ADMIN — NIFEDIPINE 90 MG: 30 TABLET, FILM COATED, EXTENDED RELEASE ORAL at 10:02

## 2018-02-22 RX ADMIN — GABAPENTIN 200 MG: 100 CAPSULE ORAL at 10:02

## 2018-02-22 RX ADMIN — ACETAMINOPHEN 650 MG: 325 TABLET ORAL at 10:02

## 2018-02-22 RX ADMIN — FERROUS SULFATE TAB EC 325 MG (65 MG FE EQUIVALENT) 325 MG: 325 (65 FE) TABLET DELAYED RESPONSE at 02:02

## 2018-02-22 RX ADMIN — FERROUS SULFATE TAB EC 325 MG (65 MG FE EQUIVALENT) 325 MG: 325 (65 FE) TABLET DELAYED RESPONSE at 06:02

## 2018-02-22 RX ADMIN — TADALAFIL 20 MG: 20 TABLET ORAL at 10:02

## 2018-02-22 RX ADMIN — ALUMINUM HYDROXIDE, MAGNESIUM HYDROXIDE, AND SIMETHICONE: 200; 200; 20 SUSPENSION ORAL at 02:02

## 2018-02-22 NOTE — PROGRESS NOTES
Discharge instructions reviewed. Prescriptions reviewed and given to patient. Pt . Verbalized understanding. All questions answered. PIV d'c'd with cath tip intact and discarded. Pt currently awaiting transport.

## 2018-02-22 NOTE — ED NOTES
Patient ambulated to bathroom with steady gait, patient given urine specimen cup - instructed on how to obtain clean catch urine specimen. Patient acknowledged understanding, denies any further questions at this time.

## 2018-02-22 NOTE — PLAN OF CARE
Problem: Patient Care Overview  Goal: Plan of Care Review  Outcome: Ongoing (interventions implemented as appropriate)  Plan of care reviewed with patient and . Verbal understanding received. Patient AOX4 on RA, VSS. PT Hgb 6.4, RN to transfuse 1 unit PRBC as prescribed. Patient independent to bathroom. Tolerating regular diet. Bed in low locked position, call light within reach. Frequent rounding made to ensure patient safety and comfort. RN will continue to monitor

## 2018-02-22 NOTE — ED NOTES
Report given to Brandy Dior. Opportunity for questions given, denies any further questions at this time. Transport requested.

## 2018-02-22 NOTE — ASSESSMENT & PLAN NOTE
Iron studies suggestive of ROXANNE. Recent EGD with Dr. Mendoza and colon 2014 - unremarkable.   No evidence of intraluminal bleeding such as melena, dark stools, hematemesis. FOBT negative.     Recommend supportive care with blood transfusion  Consider alternative diagnosis of anemia, such as other hematological problems.   We will arrange outpatient EGD/colonoscopy - if negative, will discuss VCE as outpatient.   No inpatient workup from GI perspective.

## 2018-02-22 NOTE — TELEPHONE ENCOUNTER
----- Message from Jean-Claude Dior MD sent at 2/22/2018  3:43 PM CST -----  Marlon Braswell,   Can I see her in clinic as follow up, ideally in 6-8 weeks, but no rush.   Thanks.   Jean-Claude

## 2018-02-22 NOTE — CONSULTS
[facility]  Gastroenterology  Consult Note    Patient Name: Yamel Shah  MRN: [id number]  Admission Date: 2/21/2018  Hospital Length of Stay: 1 days  Code Status: Full Code   Attending Provider: Patel Wang MD   Consulting Provider: Michelle Narayanan MD  Primary Care Physician: Aly Rand MD  Principal Problem:Anemia    Inpatient consult to Gastroenterology  Consult performed by: MICHELLE NARAYANAN  Consult ordered by: NORMA FUENTES  Reason for consult: anemia        Subjective:     HPI:  Yamel Shah is a 67 y.o. female with h/o scleroderma,  ROXANNE currently not on iron supplement presents to ED with anemia associated with fatigue. She had routine labs done by clinic which showed drop in Hgb from baseline ~ 8 to 6.5. Denies melena, BRBPR or bleeding from any other sites. She had EGD one year ago ( hyperplastic GE junction polyp resected ) and unremarkable colonoscopy in 2014 for iron deficiency anemia. Dr. Mendoza recommended surveillance EGD 6 months later but patient forgot to follow up. CBC and iron studies consistent with iron deficiency anemia with likely background anemia of chronic disease.FOBT negative per ED. No evidence of acute blood loss.     Past Medical History:   Diagnosis Date    Abnormal Pap smear     Acid reflux     Allergy     Arthritis     GERD (gastroesophageal reflux disease)     History of migraine headaches     Hypertension     Idiopathic neuropathy 7/20/2012    ILD (interstitial lung disease) 11/6/2013    Iron deficiency anemia 3/18/2014    MRSA carrier     Osteopenia     Pulmonary fibrosis     Pulmonary hypertension     Raynaud's disease     Scleroderma, diffuse     Sjogren's syndrome     Vitamin D deficiency 11/14/2013       Past Surgical History:   Procedure Laterality Date    BREAST BIOPSY      Left, benign    CERVICAL CONIZATION   W/ LASER  1970    DILATION AND CURETTAGE OF UTERUS      HYSTERECTOMY  1990     FRANDY (AUB, Fibroids), ovaries remain       Review of patient's allergies indicates:   Allergen Reactions    Sulfa (sulfonamide antibiotics)      Other reaction(s): Rash     Family History     Problem Relation (Age of Onset)    Breast cancer Mother, Sister, Maternal Aunt    Stroke Father        Social History Main Topics    Smoking status: Never Smoker    Smokeless tobacco: Never Used    Alcohol use 0.0 oz/week      Comment: ocassional glass of wine    Drug use: No    Sexual activity: Yes     Partners: Male     Review of Systems   Constitutional: Positive for activity change and appetite change.   HENT: Negative for ear discharge and facial swelling.    Eyes: Negative for photophobia and redness.   Respiratory: Negative for wheezing and stridor.    Cardiovascular: Negative for chest pain and palpitations.   Gastrointestinal: Negative for abdominal distention, abdominal pain, anal bleeding, blood in stool, constipation, diarrhea, nausea, rectal pain and vomiting.   Endocrine: Negative for cold intolerance and heat intolerance.   Genitourinary: Negative for dysuria and frequency.   Skin: Negative for color change and rash.   Allergic/Immunologic: Negative for food allergies.   Neurological: Negative for seizures and numbness.   Hematological: Does not bruise/bleed easily.   Psychiatric/Behavioral: Negative for agitation and behavioral problems.     Objective:     Vital Signs (Most Recent):  Temp: 98.2 °F (36.8 °C) (02/22/18 1149)  Pulse: 63 (02/22/18 1149)  Resp: 16 (02/22/18 1149)  BP: (!) 159/70 (02/22/18 1149)  SpO2: 99 % (02/22/18 1149) Vital Signs (24h Range):  Temp:  [97.5 °F (36.4 °C)-98.5 °F (36.9 °C)] 98.2 °F (36.8 °C)  Pulse:  [60-88] 63  Resp:  [14-42] 16  SpO2:  [94 %-100 %] 99 %  BP: (136-175)/(55-76) 159/70        There is no height or weight on file to calculate BMI.      Intake/Output Summary (Last 24 hours) at 02/22/18 1347  Last data filed at 02/22/18 1154   Gross per 24 hour   Intake            624.24 ml   Output             1600 ml   Net          -975.76 ml       Lines/Drains/Airways     Peripheral Intravenous Line                 Peripheral IV - Single Lumen 10/06/16 1352 Left Hand 504 days         Peripheral IV - Single Lumen 02/21/18 2026 Right Antecubital less than 1 day                Physical Exam   Constitutional: She is oriented to person, place, and time. She appears well-developed and well-nourished.   Eyes: No scleral icterus.   Neck: Neck supple.   Cardiovascular: Normal rate and regular rhythm.  Exam reveals no gallop.    Pulmonary/Chest: Effort normal. No respiratory distress. She has rales.   Abdominal: Soft. Bowel sounds are normal. She exhibits no distension and no mass. There is no tenderness. There is no rebound and no guarding. No hernia.   Musculoskeletal: She exhibits no edema.   Neurological: She is alert and oriented to person, place, and time.   Skin: Skin is warm.   Psychiatric: She has a normal mood and affect. Her behavior is normal.     Lab Results   Component Value Date    WBC 4.65 02/22/2018    HGB 9.0 (L) 02/22/2018    HCT 30.3 (L) 02/22/2018    MCV 73 (L) 02/22/2018     02/22/2018     Lab Results   Component Value Date    INR 1.0 06/29/2015     Lab Results   Component Value Date     02/22/2018    K 4.3 02/22/2018    CREATININE 0.7 02/22/2018     Lab Results   Component Value Date    ALBUMIN 3.2 (L) 02/21/2018    ALBUMIN 3.2 (L) 02/21/2018    ALT 7 (L) 02/21/2018    ALT 7 (L) 02/21/2018    AST 16 02/21/2018    AST 16 02/21/2018    ALKPHOS 100 02/21/2018    ALKPHOS 100 02/21/2018    BILITOT 0.3 02/21/2018    BILITOT 0.3 02/21/2018     Imaging:  Reviewed and as noted in HPI.         Assessment/Plan:     * Anemia    Iron studies suggestive of ROXANNE. Recent EGD with Dr. Mendoza and colon 2014 - unremarkable.   No evidence of intraluminal bleeding such as melena, dark stools, hematemesis. FOBT negative.     Recommend supportive care with blood transfusion  Consider  alternative diagnosis of anemia, such as other hematological problems.   We will arrange outpatient EGD/colonoscopy - if negative, will discuss VCE as outpatient.   No inpatient workup from GI perspective.             Thank you for your consult. I will follow-up with patient. Please contact us if you have any additional questions.    Jean-Claude Dior MD  Gastroenterology  [facility]

## 2018-02-22 NOTE — PLAN OF CARE
Aly Rand MD   [address] [facility] [address] [place] [address]       [facility] - [place], [place] - [address] AT [place] [address]  [address]  [facility] [address]  Phone: [phone] Fax: [phone]    [facility] - [place], [place] - [address] AT [address] [address]  [address]  [facility] [address]  Phone: [phone] Fax: [phone]    [facility] - [place], [place] - [address]  [address]  [address]  [facility] [address]  Phone: [phone] Fax: [phone]    [facility] [place] - [address]  [address]  [facility] [address]  Phone: [phone] Fax: [phone]    [facility] [place] - [address] [facility] [address]  [address] [facility] [address]  Phone: [phone] Fax: [phone]    [facility] [address] [facility]  [address] [facility] [address]  Phone: [phone] Fax: [phone]    Payor: MEDICARE / Plan: MEDICARE PART A & B / Product Type: [facility] /         02/22/18 1129   Discharge Assessment   Assessment Type Discharge Planning Assessment   Confirmed/corrected address and phone number on facesheet? Yes   Assessment information obtained from? Patient   Expected Length of Stay (days) 2   Communicated expected length of stay with patient/caregiver yes   Prior to hospitilization cognitive status: Alert/Oriented   Prior to hospitalization functional status: Independent   Current cognitive status: Alert/Oriented   Current Functional Status: Independent   Lives With spouse   Able to Return to Prior Arrangements yes   Is patient able to care for self after discharge? Yes   Who are your caregiver(s) and their phone number(s)? Lewis Shah ( spouse) [phone]   Patient's perception of discharge disposition  home or selfcare   Readmission Within The Last 30 Days no previous admission in last 30 days   Patient currently being followed by outpatient case management? No   Patient currently receives any other outside agency services? No   Equipment Currently Used at Home cane, straight   Do you have any problems affording any of your prescribed medications? No   Is the patient taking medications as prescribed? yes   Does the patient have transportation home? Yes   Transportation Available car;family or friend will provide   Does the patient receive services at the [facility]? No   Discharge Plan A Home with family   Patient/Family In Agreement With Plan yes

## 2018-02-22 NOTE — H&P
[facility] Medicine  History & Physical    Patient Name: Yamel Shah  MRN: [id number]  Admission Date: 2/21/2018  Attending Physician: Patel Wang MD   Primary Care Provider: Aly Rand MD    [facility] Medicine Team: [facility] [place] [facility]P MED 2 Juanjo Barlow MD     Patient information was obtained from patient, spouse/SO, past medical records and ER records.     Subjective:     Principal Problem:Anemia    Chief Complaint:   Chief Complaint   Patient presents with    Abnormal Lab     pt explains being recommended to the ed for low blod count. pt H/H 6.4/22.5. pt is assymptomatic. pt is aox 4.         HPI: Yamel Shah is a 67-year-old female with scleroderma and ILD with pulmonary hypertension presenting from home with anemia. Patient was in her usual state of health and underwent routine blood work with her rheumatologist today which resulted a H/H of 6.4/22.5 and was instructed to present to the ED. She only reports mild fatigue over the past several days. She denies abdominal pain, melena, BRBPR, or vaginal bleeding. She uses naproxen daily over the past several years for pain control related to her scleroderma. She underwent an EGD in 1/2017 for indigestion and dysphagia that did not reveal any ulcers or bleeding. She has a past history of iron deficient anemia for which she took iron supplements however she is unsure when or why they were stopped.    Her work-up is significant for MCV 71, Iron 12, TIBC 521, and Ferritin 3. A ANGELINE in the ED was negative for blood. She is currently asymptomatic and has no complaints.    Past Medical History:   Diagnosis Date    Abnormal Pap smear     Acid reflux     Allergy     Arthritis     GERD (gastroesophageal reflux disease)     History of migraine headaches     Hypertension     Idiopathic neuropathy 7/20/2012    ILD (interstitial lung disease) 11/6/2013    Iron deficiency anemia 3/18/2014    MRSA carrier      Osteopenia     Pulmonary fibrosis     Pulmonary hypertension     Raynaud's disease     Scleroderma, diffuse     Sjogren's syndrome     Vitamin D deficiency 11/14/2013       Past Surgical History:   Procedure Laterality Date    BREAST BIOPSY      Left, benign    CERVICAL CONIZATION   W/ LASER  1970    DILATION AND CURETTAGE OF UTERUS      HYSTERECTOMY  1990    FRANDY (AUB, Fibroids), ovaries remain       Review of patient's allergies indicates:   Allergen Reactions    Sulfa (sulfonamide antibiotics)      Other reaction(s): Rash       No current facility-administered medications on file prior to encounter.      Current Outpatient Prescriptions on File Prior to Encounter   Medication Sig    albuterol 90 mcg/actuation inhaler Inhale 2 puffs into the lungs every 4 (four) hours as needed for Wheezing.    ergocalciferol (ERGOCALCIFEROL) 50,000 unit Cap Take 1 capsule (50,000 Units total) by mouth every 7 days.    gabapentin (NEURONTIN) 300 MG capsule Take 2 in the morning, 1 pill in the afternoon, and 3 pills at night.    NIFEdipine (ADALAT CC) 90 MG TbSR TAKE 1 TABLET(90 MG) BY MOUTH EVERY DAY    omeprazole (PRILOSEC) 40 MG capsule TAKE 1 CAPSULE BY MOUTH EVERY MORNING    pravastatin (PRAVACHOL) 20 MG tablet Take 1 tablet (20 mg total) by mouth every evening.    tadalafil, PULMONARY HYPERTENSION, (ADCIRCA) 20 mg Tab TAKE TWO  TABLETS (40MG) BY MOUTH ONCE DAILY. CALL [phone] TO REFILL.    desonide (DESOWEN) 0.05 % cream Apply topically 2 (two) times daily.    FLUZONE HIGH-DOSE 2017-18, PF, 180 mcg/0.5 mL vaccine ADM 0.5ML IM UTD    multivitamin capsule Take 1 capsule by mouth once daily.     PATADAY 0.2 % Drop 1 drop once daily.     PREVIDENT 5000 BOOSTER PLUS 1.1 % Pste     ranitidine (ZANTAC) 150 MG capsule Take 150 mg by mouth 2 (two) times daily.     Family History     Problem Relation (Age of Onset)    Breast cancer Mother, Sister, Maternal Aunt    Stroke Father        Social History Main  Topics    Smoking status: Never Smoker    Smokeless tobacco: Never Used    Alcohol use 0.0 oz/week      Comment: ocassional glass of wine    Drug use: No    Sexual activity: Yes     Partners: Male     Review of Systems   Constitutional: Positive for fatigue. Negative for chills and fever.   HENT: Negative for congestion and sore throat.    Respiratory: Negative for cough, chest tightness and shortness of breath.    Cardiovascular: Negative for chest pain and palpitations.   Gastrointestinal: Negative for abdominal distention, abdominal pain, anal bleeding and blood in stool.   Genitourinary: Negative for vaginal bleeding.   Neurological: Negative for light-headedness and headaches.   Hematological: Does not bruise/bleed easily.     Objective:     Vital Signs (Most Recent):  Temp: 98.2 °F (36.8 °C) (02/21/18 1925)  Pulse: 69 (02/21/18 2340)  Resp: 16 (02/21/18 2340)  BP: (!) 165/70 (02/21/18 2340)  SpO2: 99 % (02/21/18 2340) Vital Signs (24h Range):  Temp:  [98.2 °F (36.8 °C)] 98.2 °F (36.8 °C)  Pulse:  [69-88] 69  Resp:  [14-42] 16  SpO2:  [95 %-100 %] 99 %  BP: (114-173)/(52-76) 165/70        There is no height or weight on file to calculate BMI.    Physical Exam   Constitutional: She is oriented to person, place, and time. She appears well-developed and well-nourished.   HENT:   Head: Normocephalic and atraumatic.   Eyes: EOM are normal. Pupils are equal, round, and reactive to light.   Neck: Normal range of motion. Neck supple.   Cardiovascular: Normal rate and regular rhythm.  Exam reveals no friction rub.    No murmur heard.  Pulmonary/Chest: Effort normal and breath sounds normal. No respiratory distress. She has no wheezes.   Abdominal: Soft. Bowel sounds are normal.   Genitourinary: Rectal exam shows guaiac negative stool (minimal clear stool on ANGELINE, per ED).   Musculoskeletal: She exhibits deformity (amputations of multiple digits).   Neurological: She is alert and oriented to person, place, and time.  No cranial nerve deficit.   Skin: Skin is warm and dry.         CRANIAL NERVES     CN III, IV, VI   Pupils are equal, round, and reactive to light.  Extraocular motions are normal.        Significant Labs:   BMP:   Recent Labs  Lab 02/21/18  1112   GLU 88  88     139   K 4.2  4.2     107   CO2 22*  22*   BUN 16  16   CREATININE 0.7  0.7   CALCIUM 8.7  8.7     CMP:   Recent Labs  Lab 02/21/18  1112     139   K 4.2  4.2     107   CO2 22*  22*   GLU 88  88   BUN 16  16   CREATININE 0.7  0.7   CALCIUM 8.7  8.7   PROT 8.6*  8.6*   ALBUMIN 3.2*  3.2*   BILITOT 0.3  0.3   ALKPHOS 100  100   AST 16  16   ALT 7*  7*   ANIONGAP 10  10   EGFRNONAA >60.0  >60.0       Significant Imaging: No new imaging    Assessment/Plan:     * Anemia    Microcytic anemia on lab work indicating ROXANNE (MCV 71, Iron 12, TIBC 521, and Ferritin 3). Chronically anemic likely due to anemia of chronic disease 2/2 scleroderma. Previous history of ROXANNE treated with iron supplementation. No clear source of bleeding (GI vs vaginal). Asymptomatic.  - Hgb 6.4 on admit  - Transfuse 1 unit pRBC  - oral iron replacement with stool softener  - protonix  - Follow-up CBC after transfusion  - Consider outpatient GI follow-up if Hgb remains stable        Pulmonary hypertension associated with systemic disorder    Stable  - continue tadalafil        Idiopathic neuropathy    - continue gabapentin          VTE Risk Mitigation         Ordered     Medium Risk of VTE  Once      02/22/18 0047     Place sequential compression device  Until discontinued      02/22/18 0047     Reason for No Pharmacological VTE Prophylaxis  Once      02/22/18 0047             Juanjo Barlow MD  Department of Hospital Medicine   [facility]

## 2018-02-22 NOTE — HPI
Yamel Shah is a 67-year-old female with scleroderma and ILD with pulmonary hypertension presenting from home with anemia. Patient was in her usual state of health and underwent routine blood work with her rheumatologist today which resulted a H/H of 6.4/22.5 and was instructed to present to the ED. She only reports mild fatigue over the past several days. She denies abdominal pain, melena, BRBPR, or vaginal bleeding. She uses naproxen daily over the past several years for pain control related to her scleroderma. She underwent an EGD in 1/2017 for indigestion and dysphagia that did not reveal any ulcers or bleeding. She has a past history of iron deficient anemia for which she took iron supplements however she is unsure when or why they were stopped.

## 2018-02-22 NOTE — SUBJECTIVE & OBJECTIVE
Past Medical History:   Diagnosis Date    Abnormal Pap smear     Acid reflux     Allergy     Arthritis     GERD (gastroesophageal reflux disease)     History of migraine headaches     Hypertension     Idiopathic neuropathy 7/20/2012    ILD (interstitial lung disease) 11/6/2013    Iron deficiency anemia 3/18/2014    MRSA carrier     Osteopenia     Pulmonary fibrosis     Pulmonary hypertension     Raynaud's disease     Scleroderma, diffuse     Sjogren's syndrome     Vitamin D deficiency 11/14/2013       Past Surgical History:   Procedure Laterality Date    BREAST BIOPSY      Left, benign    CERVICAL CONIZATION   W/ LASER  1970    DILATION AND CURETTAGE OF UTERUS      HYSTERECTOMY  1990    FRANDY (AUB, Fibroids), ovaries remain       Review of patient's allergies indicates:   Allergen Reactions    Sulfa (sulfonamide antibiotics)      Other reaction(s): Rash     Family History     Problem Relation (Age of Onset)    Breast cancer Mother, Sister, Maternal Aunt    Stroke Father        Social History Main Topics    Smoking status: Never Smoker    Smokeless tobacco: Never Used    Alcohol use 0.0 oz/week      Comment: ocassional glass of wine    Drug use: No    Sexual activity: Yes     Partners: Male     Review of Systems   Constitutional: Positive for activity change and appetite change.   HENT: Negative for ear discharge and facial swelling.    Eyes: Negative for photophobia and redness.   Respiratory: Negative for wheezing and stridor.    Cardiovascular: Negative for chest pain and palpitations.   Gastrointestinal: Negative for abdominal distention, abdominal pain, anal bleeding, blood in stool, constipation, diarrhea, nausea, rectal pain and vomiting.   Endocrine: Negative for cold intolerance and heat intolerance.   Genitourinary: Negative for dysuria and frequency.   Skin: Negative for color change and rash.   Allergic/Immunologic: Negative for food allergies.   Neurological: Negative for  seizures and numbness.   Hematological: Does not bruise/bleed easily.   Psychiatric/Behavioral: Negative for agitation and behavioral problems.     Objective:     Vital Signs (Most Recent):  Temp: 98.2 °F (36.8 °C) (02/22/18 1149)  Pulse: 63 (02/22/18 1149)  Resp: 16 (02/22/18 1149)  BP: (!) 159/70 (02/22/18 1149)  SpO2: 99 % (02/22/18 1149) Vital Signs (24h Range):  Temp:  [97.5 °F (36.4 °C)-98.5 °F (36.9 °C)] 98.2 °F (36.8 °C)  Pulse:  [60-88] 63  Resp:  [14-42] 16  SpO2:  [94 %-100 %] 99 %  BP: (136-175)/(55-76) 159/70        There is no height or weight on file to calculate BMI.      Intake/Output Summary (Last 24 hours) at 02/22/18 1347  Last data filed at 02/22/18 1154   Gross per 24 hour   Intake           624.24 ml   Output             1600 ml   Net          -975.76 ml       Lines/Drains/Airways     Peripheral Intravenous Line                 Peripheral IV - Single Lumen 10/06/16 1352 Left Hand 504 days         Peripheral IV - Single Lumen 02/21/18 2026 Right Antecubital less than 1 day                Physical Exam   Constitutional: She is oriented to person, place, and time. She appears well-developed and well-nourished.   Eyes: No scleral icterus.   Neck: Neck supple.   Cardiovascular: Normal rate and regular rhythm.  Exam reveals no gallop.    Pulmonary/Chest: Effort normal. No respiratory distress. She has rales.   Abdominal: Soft. Bowel sounds are normal. She exhibits no distension and no mass. There is no tenderness. There is no rebound and no guarding. No hernia.   Musculoskeletal: She exhibits no edema.   Neurological: She is alert and oriented to person, place, and time.   Skin: Skin is warm.   Psychiatric: She has a normal mood and affect. Her behavior is normal.     Lab Results   Component Value Date    WBC 4.65 02/22/2018    HGB 9.0 (L) 02/22/2018    HCT 30.3 (L) 02/22/2018    MCV 73 (L) 02/22/2018     02/22/2018     Lab Results   Component Value Date    INR 1.0 06/29/2015     Lab Results    Component Value Date     02/22/2018    K 4.3 02/22/2018    CREATININE 0.7 02/22/2018     Lab Results   Component Value Date    ALBUMIN 3.2 (L) 02/21/2018    ALBUMIN 3.2 (L) 02/21/2018    ALT 7 (L) 02/21/2018    ALT 7 (L) 02/21/2018    AST 16 02/21/2018    AST 16 02/21/2018    ALKPHOS 100 02/21/2018    ALKPHOS 100 02/21/2018    BILITOT 0.3 02/21/2018    BILITOT 0.3 02/21/2018     Imaging:  Reviewed and as noted in HPI.

## 2018-02-22 NOTE — ED NOTES
"Patient states went to Primary MD- Dr Belcher, today for well check up annual exam. Patient states routine blood work performed and hemoglobin noted to be to "maybe a 6" per patient. Patient denies any s/s at this time. Denies dizziness, shortness of breath, chest pain. Patient A7O x's 4. Pink, warm, dry. No acute distress noted at this time.  "

## 2018-02-22 NOTE — HPI
Yamel Shah is a 67 y.o. female with h/o scleroderma,  ROXANNE currently not on iron supplement presents to ED with anemia associated with fatigue. She had routine labs done by clinic which showed drop in Hgb from baseline ~ 8 to 6.5. Denies melena, BRBPR or bleeding from any other sites. She had EGD one year ago ( hyperplastic GE junction polyp resected ) and unremarkable colonoscopy in 2014 for iron deficiency anemia. Dr. Mendoza recommended surveillance EGD 6 months later but patient forgot to follow up. CBC and iron studies consistent with iron deficiency anemia with likely background anemia of chronic disease.FOBT negative per ED. No evidence of acute blood loss.

## 2018-02-22 NOTE — ASSESSMENT & PLAN NOTE
Microcytic anemia on lab work indicating ROXANNE (MCV 71, Iron 12, TIBC 521, and Ferritin 3). Chronically anemic likely due to anemia of chronic disease 2/2 scleroderma. Previous history of ROXANNE treated with iron supplementation. No clear source of bleeding (GI vs vaginal). Asymptomatic.  - Hgb 6.4 on admit  - Transfuse 1 unit pRBC  - oral iron replacement with stool softener  - protonix  - Follow-up CBC after transfusion  - Consider outpatient GI follow-up if Hgb remains stable

## 2018-02-22 NOTE — ED NOTES
"Attempted to call report for room 629. States "give 5 minutes and call back for report" will return call for report.  "

## 2018-02-22 NOTE — SUBJECTIVE & OBJECTIVE
Past Medical History:   Diagnosis Date    Abnormal Pap smear     Acid reflux     Allergy     Arthritis     GERD (gastroesophageal reflux disease)     History of migraine headaches     Hypertension     Idiopathic neuropathy 7/20/2012    ILD (interstitial lung disease) 11/6/2013    Iron deficiency anemia 3/18/2014    MRSA carrier     Osteopenia     Pulmonary fibrosis     Pulmonary hypertension     Raynaud's disease     Scleroderma, diffuse     Sjogren's syndrome     Vitamin D deficiency 11/14/2013       Past Surgical History:   Procedure Laterality Date    BREAST BIOPSY      Left, benign    CERVICAL CONIZATION   W/ LASER  1970    DILATION AND CURETTAGE OF UTERUS      HYSTERECTOMY  1990    FRANDY (AUB, Fibroids), ovaries remain       Review of patient's allergies indicates:   Allergen Reactions    Sulfa (sulfonamide antibiotics)      Other reaction(s): Rash       No current facility-administered medications on file prior to encounter.      Current Outpatient Prescriptions on File Prior to Encounter   Medication Sig    albuterol 90 mcg/actuation inhaler Inhale 2 puffs into the lungs every 4 (four) hours as needed for Wheezing.    ergocalciferol (ERGOCALCIFEROL) 50,000 unit Cap Take 1 capsule (50,000 Units total) by mouth every 7 days.    gabapentin (NEURONTIN) 300 MG capsule Take 2 in the morning, 1 pill in the afternoon, and 3 pills at night.    NIFEdipine (ADALAT CC) 90 MG TbSR TAKE 1 TABLET(90 MG) BY MOUTH EVERY DAY    omeprazole (PRILOSEC) 40 MG capsule TAKE 1 CAPSULE BY MOUTH EVERY MORNING    pravastatin (PRAVACHOL) 20 MG tablet Take 1 tablet (20 mg total) by mouth every evening.    tadalafil, PULMONARY HYPERTENSION, (ADCIRCA) 20 mg Tab TAKE TWO  TABLETS (40MG) BY MOUTH ONCE DAILY. CALL [phone] TO REFILL.    desonide (DESOWEN) 0.05 % cream Apply topically 2 (two) times daily.    FLUZONE HIGH-DOSE 2017-18, PF, 180 mcg/0.5 mL vaccine ADM 0.5ML IM UTD    multivitamin capsule Take 1  capsule by mouth once daily.     PATADAY 0.2 % Drop 1 drop once daily.     PREVIDENT 5000 BOOSTER PLUS 1.1 % Pste     ranitidine (ZANTAC) 150 MG capsule Take 150 mg by mouth 2 (two) times daily.     Family History     Problem Relation (Age of Onset)    Breast cancer Mother, Sister, Maternal Aunt    Stroke Father        Social History Main Topics    Smoking status: Never Smoker    Smokeless tobacco: Never Used    Alcohol use 0.0 oz/week      Comment: ocassional glass of wine    Drug use: No    Sexual activity: Yes     Partners: Male     Review of Systems   Constitutional: Positive for fatigue. Negative for chills and fever.   HENT: Negative for congestion and sore throat.    Respiratory: Negative for cough, chest tightness and shortness of breath.    Cardiovascular: Negative for chest pain and palpitations.   Gastrointestinal: Negative for abdominal distention, abdominal pain, anal bleeding and blood in stool.   Genitourinary: Negative for vaginal bleeding.   Neurological: Negative for light-headedness and headaches.   Hematological: Does not bruise/bleed easily.     Objective:     Vital Signs (Most Recent):  Temp: 98.2 °F (36.8 °C) (02/21/18 1925)  Pulse: 69 (02/21/18 2340)  Resp: 16 (02/21/18 2340)  BP: (!) 165/70 (02/21/18 2340)  SpO2: 99 % (02/21/18 2340) Vital Signs (24h Range):  Temp:  [98.2 °F (36.8 °C)] 98.2 °F (36.8 °C)  Pulse:  [69-88] 69  Resp:  [14-42] 16  SpO2:  [95 %-100 %] 99 %  BP: (114-173)/(52-76) 165/70        There is no height or weight on file to calculate BMI.    Physical Exam   Constitutional: She is oriented to person, place, and time. She appears well-developed and well-nourished.   HENT:   Head: Normocephalic and atraumatic.   Eyes: EOM are normal. Pupils are equal, round, and reactive to light.   Neck: Normal range of motion. Neck supple.   Cardiovascular: Normal rate and regular rhythm.  Exam reveals no friction rub.    No murmur heard.  Pulmonary/Chest: Effort normal and breath  sounds normal. No respiratory distress. She has no wheezes.   Abdominal: Soft. Bowel sounds are normal.   Genitourinary: Rectal exam shows guaiac negative stool (minimal clear stool on ANGELINE, per ED).   Musculoskeletal: She exhibits deformity (amputations of multiple digits).   Neurological: She is alert and oriented to person, place, and time. No cranial nerve deficit.   Skin: Skin is warm and dry.         CRANIAL NERVES     CN III, IV, VI   Pupils are equal, round, and reactive to light.  Extraocular motions are normal.        Significant Labs:   BMP:   Recent Labs  Lab 02/21/18  1112   GLU 88  88     139   K 4.2  4.2     107   CO2 22*  22*   BUN 16  16   CREATININE 0.7  0.7   CALCIUM 8.7  8.7     CMP:   Recent Labs  Lab 02/21/18  1112     139   K 4.2  4.2     107   CO2 22*  22*   GLU 88  88   BUN 16  16   CREATININE 0.7  0.7   CALCIUM 8.7  8.7   PROT 8.6*  8.6*   ALBUMIN 3.2*  3.2*   BILITOT 0.3  0.3   ALKPHOS 100  100   AST 16  16   ALT 7*  7*   ANIONGAP 10  10   EGFRNONAA >60.0  >60.0       Significant Imaging: No new imaging

## 2018-02-22 NOTE — ED PROVIDER NOTES
Encounter Date: 2/21/2018       History     Chief Complaint   Patient presents with    Abnormal Lab     pt explains being recommended to the ed for low blod count. pt H/H 6.4/22.5. pt is assymptomatic. pt is aox 4.      Yamel Shah is a very pleasant 67 year old female with pertinent h/o Scleroderma (dx'd in 1983), ROXANNE who presents after routine labs revealed a Hgb of 6.4. Endorses SOB on exertion which is minimally worse than b/l however denies dizziness, blurred vision, chest pain, shortness of breath, melena, BRBPR, epistaxis, hematuria. She is not on any blood thinners. States she has never had a transfusion. Denies using any blood thinners. Of note, patient had an EGD done in 1/2017 which revealed a polyp at the GE junction with recommended follow up 6 months from the procedure to ensure complete removal however patient forgot to get follow up scope.          Review of patient's allergies indicates:   Allergen Reactions    Sulfa (sulfonamide antibiotics)      Other reaction(s): Rash     Past Medical History:   Diagnosis Date    Abnormal Pap smear     Acid reflux     Allergy     Arthritis     GERD (gastroesophageal reflux disease)     History of migraine headaches     Hypertension     Idiopathic neuropathy 7/20/2012    ILD (interstitial lung disease) 11/6/2013    Iron deficiency anemia 3/18/2014    MRSA carrier     Osteopenia     Pulmonary fibrosis     Pulmonary hypertension     Raynaud's disease     Scleroderma, diffuse     Sjogren's syndrome     Vitamin D deficiency 11/14/2013     Past Surgical History:   Procedure Laterality Date    BREAST BIOPSY      Left, benign    CERVICAL CONIZATION   W/ LASER  1970    DILATION AND CURETTAGE OF UTERUS      HYSTERECTOMY  1990    FRANDY (AUB, Fibroids), ovaries remain     Family History   Problem Relation Age of Onset    Breast cancer Mother     Stroke Father     Breast cancer Sister     Breast cancer Maternal Aunt     Melanoma Neg Hx      Colon cancer Neg Hx     Crohn's disease Neg Hx     Stomach cancer Neg Hx     Ulcerative colitis Neg Hx     Rectal cancer Neg Hx     Irritable bowel syndrome Neg Hx     Esophageal cancer Neg Hx     Celiac disease Neg Hx      Social History   Substance Use Topics    Smoking status: Never Smoker    Smokeless tobacco: Never Used    Alcohol use 0.0 oz/week      Comment: ocassional glass of wine     Review of Systems   Constitutional: Negative for activity change, chills and fever.   HENT: Negative for congestion and sore throat.    Eyes: Negative.    Respiratory: Negative for cough and shortness of breath.    Cardiovascular: Negative for chest pain and leg swelling.   Gastrointestinal: Negative for abdominal pain and constipation.   Endocrine: Negative.    Genitourinary: Negative for difficulty urinating, dysuria, hematuria and vaginal bleeding.   Musculoskeletal: Negative.    Neurological: Negative for dizziness and headaches.       Physical Exam     Initial Vitals [02/21/18 1925]   BP Pulse Resp Temp SpO2   (!) 173/73 80 20 98.2 °F (36.8 °C) 100 %      MAP       106.33         Physical Exam    Constitutional: She appears well-developed and well-nourished.   HENT:   Head: Normocephalic and atraumatic.   Eyes: Pupils are equal, round, and reactive to light.   Neck: Normal range of motion.   Cardiovascular: Normal rate and regular rhythm. Exam reveals no gallop and no friction rub.    No murmur heard.  Pulmonary/Chest: No respiratory distress. She has no wheezes. She has no rales.   Abdominal: Soft. There is no tenderness.   Genitourinary:   Genitourinary Comments: -FOBT, very minimal clear stool in rectal vault.   Neurological: She is alert and oriented to person, place, and time.         ED Course   Procedures  Labs Reviewed   CBC W/ AUTO DIFFERENTIAL - Abnormal; Notable for the following:        Result Value    RBC 3.24 (*)     Hemoglobin 6.9 (*)     Hematocrit 23.8 (*)     MCV 74 (*)     MCH 21.3 (*)      MCHC 29.0 (*)     RDW 25.0 (*)     All other components within normal limits    Narrative:     cbcwd add on per Renan Gaytan MD [id number]   02/21/2018  21:36    URINALYSIS, REFLEX TO URINE CULTURE    Narrative:     Preferred Collection Type->Urine, Clean Catch  Received a cup of urine.   CBC W/ AUTO DIFFERENTIAL   TYPE & SCREEN   ANTIBODY IDENTIFICATION                   APC / Resident Notes:   Patient is a 66 yo F who presents after routine labs revealed Hgb 6.4. Given 1 U pRBC. CBC ordered prior to unit and Hgb actually 6.9. Admitted to hospital medicine.         Attending Attestation:   Physician Attestation Statement for Resident:  As the supervising MD   Physician Attestation Statement: I have personally seen and examined this patient.   I agree with the above history. -:   As the supervising MD I agree with the above PE.    As the supervising MD I agree with the above treatment, course, plan, and disposition.   -: Patient with acute symptomatic anemia.  Fecal occult blood was negative.  We will admit to medicine for transfusion.  I have reviewed and agree with the residents interpretation of the following: lab data.                       Clinical Impression:   The encounter diagnosis was Anemia.    Disposition:   Disposition: Admitted  Condition: Stable                        Edi Duarte MD  Resident  02/21/18 2240       Edi Duarte MD  Resident  02/21/18 2240       Renan Gaytan MD  02/21/18 7654

## 2018-02-23 ENCOUNTER — TELEPHONE (OUTPATIENT)
Dept: RHEUMATOLOGY | Facility: CLINIC | Age: 68
End: 2018-02-23

## 2018-02-23 DIAGNOSIS — I87.2 VENOUS INSUFFICIENCY OF BOTH LOWER EXTREMITIES: Primary | ICD-10-CM

## 2018-02-23 DIAGNOSIS — D50.0 IRON DEFICIENCY ANEMIA DUE TO CHRONIC BLOOD LOSS: Primary | ICD-10-CM

## 2018-02-23 NOTE — PLAN OF CARE
Future Appointments  Date Time Provider Department Center   2/26/2018 2:30 PM Timmy Claudio MD [facility] VASCSUR [facility]   3/6/2018 7:40 AM Citlaly Rogers NP [facility] WOUND [facility]   3/6/2018 9:00 AM PULMONARY FUNCTION [facility] PULMLAB [facility]   3/6/2018 9:30 AM PULMONARY FUNCTION [facility] PUL[facility]   3/6/2018 10:00 AM PULMONARY FUNCTION [facility] PULMLAB [facility]   3/23/2018 11:20 AM [facility], DEXA1 [facility] BMD [facility]   3/23/2018 1:00 PM Jeannette Pringle NP [facility] GASTRO [facility]   5/18/2018 8:30 AM LAB, APPOINTMENT [facility] VNP [facility]   5/23/2018 9:00 AM Nahum Ayoub MD [facility] RHEUM [facility]        02/23/18 0747   Final Note   Assessment Type Final Discharge Note   Discharge Disposition Home   What phone number can be called within the next 1-3 days to see how you are doing after discharge? [phone]   Hospital Follow Up  Appt(s) scheduled? Yes   Right Care Referral Info   Post Acute Recommendation No Care

## 2018-02-23 NOTE — DISCHARGE SUMMARY
[facility] Hospital Medicine  Discharge Summary      Patient Name: Yamel Shah  MRN: [id number]  Admission Date: 2/21/2018  Hospital Length of Stay: 1 days  Discharge Date and Time:  02/22/2018 8:42 PM  Attending Physician: Patel Wang MD  Discharging Provider: Maria Quintanilla MD  Primary Care Provider: lAy Rand MD  [facility] Medicine Team: [facility] MED 2 Maria Quintanilla MD    HPI:   Yamel Shah is a 67-year-old female with scleroderma and ILD with pulmonary hypertension presenting from home with anemia. Patient was in her usual state of health and underwent routine blood work with her rheumatologist today which resulted a H/H of 6.4/22.5 and was instructed to present to the ED. She only reports mild fatigue over the past several days. She denies abdominal pain, melena, BRBPR, or vaginal bleeding. She uses naproxen daily over the past several years for pain control related to her scleroderma. She underwent an EGD in 1/2017 for indigestion and dysphagia that did not reveal any ulcers or bleeding. She has a past history of iron deficient anemia for which she took iron supplements however she is unsure when or why they were stopped.      * No surgery found *      Hospital Course:   Upon presentation to the ED she was hemodynamically stable, ANGELINE negative for gross blood in stool. In the labs, Hgb 6.9, MCV 71, Iron 12, TIBC 521, and Ferritin 3. Patient admitted for blood transfusion after consent was obtained, received 2 units of pRBC without any complications and Hgb improved to 9. LDH and haptoglobin WNL as well. She was evaluated by GI team who recommended supportive care and outpatient follow up in the clinic for colonoscopy and EGD, since the patient did not have any findings indicative of active bleeding.         Review of Systems   Constitutional: Positive for fatigue. Negative for chills and fever.   HENT: Negative for sore throat.    Eyes: Negative for visual  disturbance.   Respiratory: Positive for shortness of breath (upon going up the stairs). Negative for cough.    Cardiovascular: Negative for chest pain and palpitations.   Gastrointestinal: Negative for abdominal pain, blood in stool, diarrhea, nausea and vomiting.   Genitourinary: Negative for hematuria and vaginal bleeding.   Musculoskeletal: Positive for arthralgias and myalgias.   Skin: Negative for rash.   Neurological: Negative for dizziness, syncope and headaches.   Psychiatric/Behavioral: Negative for agitation and confusion. The patient is not nervous/anxious.       Objective:      Vital Signs (Most Recent):  Temp: 98.2 °F (36.8 °C) (02/22/18 1149)  Pulse: 63 (02/22/18 1149)  Resp: 16 (02/22/18 1149)  BP: (!) 159/70 (02/22/18 1149)  SpO2: 99 % (02/22/18 1149) Vital Signs (24h Range):  Temp:  [97.5 °F (36.4 °C)-98.5 °F (36.9 °C)] 98.2 °F (36.8 °C)  Pulse:  [60-88] 63  Resp:  [14-42] 16  SpO2:  [94 %-100 %] 99 %  BP: (136-175)/(55-76) 159/70      There is no height or weight on file to calculate BMI.     Intake/Output Summary (Last 24 hours) at 02/22/18 2016  Last data filed at 02/22/18 1154    Gross per 24 hour   Intake           624.24 ml   Output             1600 ml   Net          -975.76 ml      Physical Exam   Constitutional: She is oriented to person, place, and time. She is cooperative. She does not appear ill. No distress.   HENT:   Head: Normocephalic and atraumatic.   Mouth/Throat: Mucous membranes are normal.   Wrinkling of the lips  Mild microstomia   Eyes: Conjunctivae and EOM are normal. Pupils are equal, round, and reactive to light. No scleral icterus.   Neck: Normal range of motion. Neck supple.   Cardiovascular: Normal rate and regular rhythm.  Exam reveals no gallop.    No murmur heard.  Pulses:       Radial pulses are 2+ on the right side, and 2+ on the left side.   Pulmonary/Chest: Effort normal. No accessory muscle usage. No tachypnea. No respiratory distress. She has no decreased breath  sounds. She has wheezes (right sided, mild). She has no rales.   Abdominal: Soft. Bowel sounds are normal. She exhibits no distension. There is no tenderness. There is no rigidity and no guarding.   Musculoskeletal: She exhibits deformity (distal extremities). She exhibits no edema.   Sclerodactyly  Degeneration and contracture of the digital joints  Multiple ulcers in bilateral feet  Neurological: She is alert and oriented to person, place, and time.   Skin: Skin is warm. No erythema.   Increased thickness more evident in the face  absence of appendicular hair   No evident sign of calcinosis cutis   Psychiatric: She has a normal mood and affect. Her speech is normal and behavior is normal. Cognition and memory are normal.   Vitals reviewed.       Consults:   Consults         Status Ordering Provider     Inpatient consult to Gastroenterology  Once     Provider:  (Not yet assigned)    NORMA Taylor          * Anemia    Microcytic anemia on lab work indicating ROXANNE (MCV 71, Iron 12, TIBC 521, and Ferritin 3). Asymptomatic. No evidence of active bleeding  - on arrival Hgb 6.9  - Transfused 2 units of pRBC   - repeat Hgb 9  - GI evaluated the patient and will have her follow up in the clinic for outpatient EGD and colonoscopy  - Discharged with iron supplement and stool softeners        Pulmonary hypertension associated with systemic disorder    stable  - continue tadalafil        Idiopathic neuropathy    - continue gabapentin          Final Active Diagnoses:    Diagnosis Date Noted POA    PRINCIPAL PROBLEM:  Anemia [D64.9] 02/21/2018 Yes    Pulmonary hypertension associated with systemic disorder [I27.29] 01/08/2013 Yes     Chronic    Idiopathic neuropathy [G60.9] 07/20/2012 Yes      Problems Resolved During this Admission:    Diagnosis Date Noted Date Resolved POA       Discharged Condition: good    Disposition: Home or Self Care    Follow Up:  Follow-up Information     [facility].     Specialty:  Gastroenterology  Why:  you will be contacted by GI clinic to schedule an appointment for outpatient colonoscopy and EGD  Contact information:  An[facility] Gatica  [facility] [address] [phone]  Additional information:  [facility] [address]                 Significant Diagnostic Studies: Labs:   CMP   Recent Labs  Lab 02/21/18  1112 02/22/18  0410     139 139   K 4.2  4.2 4.3     107 109   CO2 22*  22* 22*   GLU 88  88 85   BUN 16  16 15   CREATININE 0.7  0.7 0.7   CALCIUM 8.7  8.7 8.5*   PROT 8.6*  8.6*  --    ALBUMIN 3.2*  3.2*  --    BILITOT 0.3  0.3  --    ALKPHOS 100  100  --    AST 16  16  --    ALT 7*  7*  --    ANIONGAP 10  10 8   ESTGFRAFRICA >60.0  >60.0 >60.0   EGFRNONAA >60.0  >60.0 >60.0   , CBC   Recent Labs  Lab 02/21/18  2043 02/22/18  0410 02/22/18  1307   WBC 4.12 3.65* 4.65   HGB 6.9* 5.9* 9.0*   HCT 23.8* 20.6* 30.3*    276 292   , INR   Lab Results   Component Value Date    INR 1.0 06/29/2015    INR 1.0 01/21/2013    INR 1.0 09/29/2007    and Lipid Panel   Lab Results   Component Value Date    CHOL 118 (L) 02/21/2018    HDL 54 02/21/2018    LDLCALC 52.4 (L) 02/21/2018    TRIG 58 02/21/2018    CHOLHDL 45.8 02/21/2018       Pending Diagnostic Studies:     None         Medications:  Reconciled Home Medications:   Discharge Medication List as of 2/22/2018  2:56 PM      START taking these medications    Details   ferrous sulfate 325 (65 FE) MG EC tablet Take 1 tablet (325 mg total) by mouth 3 (three) times daily., Starting Thu 2/22/2018, OTC         CONTINUE these medications which have NOT CHANGED    Details   albuterol 90 mcg/actuation inhaler Inhale 2 puffs into the lungs every 4 (four) hours as needed for Wheezing., Starting 2/20/2017, Until Discontinued, Normal      desonide (DESOWEN) 0.05 % cream Apply topically 2 (two) times daily., Starting 11/10/2015, Until Fri 11/20/15, Normal      ergocalciferol (ERGOCALCIFEROL) 50,000 unit  Cap Take 1 capsule (50,000 Units total) by mouth every 7 days., Starting Wed 2/21/2018, Normal      gabapentin (NEURONTIN) 300 MG capsule Take 2 in the morning, 1 pill in the afternoon, and 3 pills at night., Normal      multivitamin capsule Take 1 capsule by mouth once daily. , Until Discontinued, Historical Med      NIFEdipine (ADALAT CC) 90 MG TbSR TAKE 1 TABLET(90 MG) BY MOUTH EVERY DAY, Normal      omeprazole (PRILOSEC) 40 MG capsule TAKE 1 CAPSULE BY MOUTH EVERY MORNING, Normal      PATADAY 0.2 % Drop 1 drop once daily. , Starting 8/1/2015, Until Discontinued, Historical Med      pravastatin (PRAVACHOL) 20 MG tablet Take 1 tablet (20 mg total) by mouth every evening., Starting Wed 2/21/2018, Normal      PREVIDENT 5000 BOOSTER PLUS 1.1 % Pste Starting 12/18/2015, Until Discontinued, Historical Med      ranitidine (ZANTAC) 150 MG capsule Take 150 mg by mouth 2 (two) times daily., Until Discontinued, Historical Med      tadalafil, PULMONARY HYPERTENSION, (ADCIRCA) 20 mg Tab TAKE TWO  TABLETS (40MG) BY MOUTH ONCE DAILY. CALL [phone] TO REFILL., Normal         STOP taking these medications       FLUZONE HIGH-DOSE 2017-18, PF, 180 mcg/0.5 mL vaccine Comments:   Reason for Stopping:               Indwelling Lines/Drains at time of discharge:   Lines/Drains/Airways          No matching active lines, drains, or airways          Time spent on the discharge of patient: 45 minutes  Patient was seen and examined on the date of discharge and determined to be suitable for discharge.       Maria Quintanilla MD  Department of Hospital Medicine  [facility]

## 2018-02-23 NOTE — ASSESSMENT & PLAN NOTE
Microcytic anemia on lab work indicating ROXANNE (MCV 71, Iron 12, TIBC 521, and Ferritin 3). Asymptomatic. No evidence of active bleeding  - on arrival Hgb 6.9  - Transfused 2 units of pRBC   - repeat Hgb 9  - GI evaluated the patient and will have her follow up in the clinic for outpatient EGD and colonoscopy  - Discharged with iron supplement and stool softeners

## 2018-02-23 NOTE — HOSPITAL COURSE
Upon presentation to the ED she was hemodynamically stable, ANGELINE negative for gross blood in stool. In the labs, Hgb 6.9, MCV 71, Iron 12, TIBC 521, and Ferritin 3. Patient admitted for blood transfusion after consent was obtained, received 2 units of pRBC without any complications and Hgb improved to 9. LDH and haptoglobin WNL as well. She was evaluated by GI team who recommended supportive care and outpatient follow up in the clinic for colonoscopy and EGD, since the patient did not have any findings indicative of active bleeding.         Review of Systems   Constitutional: Positive for fatigue. Negative for chills and fever.   HENT: Negative for sore throat.    Eyes: Negative for visual disturbance.   Respiratory: Positive for shortness of breath (upon going up the stairs). Negative for cough.    Cardiovascular: Negative for chest pain and palpitations.   Gastrointestinal: Negative for abdominal pain, blood in stool, diarrhea, nausea and vomiting.   Genitourinary: Negative for hematuria and vaginal bleeding.   Musculoskeletal: Positive for arthralgias and myalgias.   Skin: Negative for rash.   Neurological: Negative for dizziness, syncope and headaches.   Psychiatric/Behavioral: Negative for agitation and confusion. The patient is not nervous/anxious.       Objective:      Vital Signs (Most Recent):  Temp: 98.2 °F (36.8 °C) (02/22/18 1149)  Pulse: 63 (02/22/18 1149)  Resp: 16 (02/22/18 1149)  BP: (!) 159/70 (02/22/18 1149)  SpO2: 99 % (02/22/18 1149) Vital Signs (24h Range):  Temp:  [97.5 °F (36.4 °C)-98.5 °F (36.9 °C)] 98.2 °F (36.8 °C)  Pulse:  [60-88] 63  Resp:  [14-42] 16  SpO2:  [94 %-100 %] 99 %  BP: (136-175)/(55-76) 159/70      There is no height or weight on file to calculate BMI.     Intake/Output Summary (Last 24 hours) at 02/22/18 2016  Last data filed at 02/22/18 1154    Gross per 24 hour   Intake           624.24 ml   Output             1600 ml   Net          -975.76 ml      Physical Exam    Constitutional: She is oriented to person, place, and time. She is cooperative. She does not appear ill. No distress.   HENT:   Head: Normocephalic and atraumatic.   Mouth/Throat: Mucous membranes are normal.   Wrinkling of the lips  Mild microstomia   Eyes: Conjunctivae and EOM are normal. Pupils are equal, round, and reactive to light. No scleral icterus.   Neck: Normal range of motion. Neck supple.   Cardiovascular: Normal rate and regular rhythm.  Exam reveals no gallop.    No murmur heard.  Pulses:       Radial pulses are 2+ on the right side, and 2+ on the left side.   Pulmonary/Chest: Effort normal. No accessory muscle usage. No tachypnea. No respiratory distress. She has no decreased breath sounds. She has wheezes (right sided, mild). She has no rales.   Abdominal: Soft. Bowel sounds are normal. She exhibits no distension. There is no tenderness. There is no rigidity and no guarding.   Musculoskeletal: She exhibits deformity (distal extremities). She exhibits no edema.   Sclerodactyly  Degeneration and contracture of the digital joints  Multiple ulcers in bilateral feet  Neurological: She is alert and oriented to person, place, and time.   Skin: Skin is warm. No erythema.   Increased thickness more evident in the face  absence of appendicular hair   No evident sign of calcinosis cutis   Psychiatric: She has a normal mood and affect. Her speech is normal and behavior is normal. Cognition and memory are normal.   Vitals reviewed.

## 2018-02-23 NOTE — SUBJECTIVE & OBJECTIVE
Review of Systems   Constitutional: Positive for fatigue. Negative for chills and fever.   HENT: Negative for sore throat.    Eyes: Negative for visual disturbance.   Respiratory: Positive for shortness of breath (upon going up the stairs). Negative for cough.    Cardiovascular: Negative for chest pain and palpitations.   Gastrointestinal: Negative for abdominal pain, blood in stool, diarrhea, nausea and vomiting.   Genitourinary: Negative for hematuria and vaginal bleeding.   Musculoskeletal: Positive for arthralgias and myalgias.   Skin: Negative for rash.   Neurological: Negative for dizziness, syncope and headaches.   Psychiatric/Behavioral: Negative for agitation and confusion. The patient is not nervous/anxious.      Objective:     Vital Signs (Most Recent):  Temp: 98.2 °F (36.8 °C) (02/22/18 1149)  Pulse: 63 (02/22/18 1149)  Resp: 16 (02/22/18 1149)  BP: (!) 159/70 (02/22/18 1149)  SpO2: 99 % (02/22/18 1149) Vital Signs (24h Range):  Temp:  [97.5 °F (36.4 °C)-98.5 °F (36.9 °C)] 98.2 °F (36.8 °C)  Pulse:  [60-88] 63  Resp:  [14-42] 16  SpO2:  [94 %-100 %] 99 %  BP: (136-175)/(55-76) 159/70        There is no height or weight on file to calculate BMI.    Intake/Output Summary (Last 24 hours) at 02/22/18 2016  Last data filed at 02/22/18 1154   Gross per 24 hour   Intake           624.24 ml   Output             1600 ml   Net          -975.76 ml      Physical Exam   Constitutional: She is oriented to person, place, and time. She is cooperative. She does not appear ill. No distress.   HENT:   Head: Normocephalic and atraumatic.   Mouth/Throat: Mucous membranes are normal.   Wrinkling of the lips  Mild microstomia     Eyes: Conjunctivae and EOM are normal. Pupils are equal, round, and reactive to light. No scleral icterus.   Neck: Normal range of motion. Neck supple.   Cardiovascular: Normal rate and regular rhythm.  Exam reveals no gallop.    No murmur heard.  Pulses:       Radial pulses are 2+ on the right  side, and 2+ on the left side.   Pulmonary/Chest: Effort normal. No accessory muscle usage. No tachypnea. No respiratory distress. She has no decreased breath sounds. She has wheezes (right sided, mild). She has no rales.   Abdominal: Soft. Bowel sounds are normal. She exhibits no distension. There is no tenderness. There is no rigidity and no guarding.   Musculoskeletal: She exhibits deformity (distal extremities). She exhibits no edema.   Sclerodactyly  Degeneration and contracture of the digital joints  Multiple ulcers in bilateral feet     Neurological: She is alert and oriented to person, place, and time.   Skin: Skin is warm. No erythema.   Increased thickness more evident in the face  absence of appendicular hair   No evident sign of calcinosis cutis   Psychiatric: She has a normal mood and affect. Her speech is normal and behavior is normal. Cognition and memory are normal.   Vitals reviewed.      Significant Labs:   CBC:   Recent Labs  Lab 02/21/18  2043 02/22/18  0410 02/22/18  1307   WBC 4.12 3.65* 4.65   HGB 6.9* 5.9* 9.0*   HCT 23.8* 20.6* 30.3*    276 292     CMP:   Recent Labs  Lab 02/21/18  1112 02/22/18  0410     139 139   K 4.2  4.2 4.3     107 109   CO2 22*  22* 22*   GLU 88  88 85   BUN 16  16 15   CREATININE 0.7  0.7 0.7   CALCIUM 8.7  8.7 8.5*   PROT 8.6*  8.6*  --    ALBUMIN 3.2*  3.2*  --    BILITOT 0.3  0.3  --    ALKPHOS 100  100  --    AST 16  16  --    ALT 7*  7*  --    ANIONGAP 10  10 8   EGFRNONAA >60.0  >60.0 >60.0     Lipid Panel:   Recent Labs  Lab 02/21/18  1112   CHOL 118*   HDL 54   LDLCALC 52.4*   TRIG 58   CHOLHDL 45.8     Recent Lab Results       02/22/18  1307 02/22/18  0410 02/21/18  2144 02/21/18  2043 02/21/18  2040      Immature Granulocytes 0.4 0.3  0.2      Immature Grans (Abs) 0.02  Comment:  Mild elevation in immature granulocytes is non specific and   can be seen in a variety of conditions including stress response,   acute  inflammation, trauma and pregnancy. Correlation with other   laboratory and clinical findings is essential.   0.01  Comment:  Mild elevation in immature granulocytes is non specific and   can be seen in a variety of conditions including stress response,   acute inflammation, trauma and pregnancy. Correlation with other   laboratory and clinical findings is essential.    0.01  Comment:  Mild elevation in immature granulocytes is non specific and   can be seen in a variety of conditions including stress response,   acute inflammation, trauma and pregnancy. Correlation with other   laboratory and clinical findings is essential.        Unit Blood Type Code     5100[P]     Unit Expiration     [id number][P]     Unit Blood Type     O POS[P]     Anion Gap  8        Antibody Identification     POS  Comment:  Warm Autoagglutinin  Anti-V       Appearance, UA   Clear       Baso # 0.04 0.04  0.04      Basophil% 0.9 1.1  1.0      Bilirubin (UA)   Negative       BUN, Bld  15        Calcium  8.5(L)        Chloride  109        CO2  22(L)        CODING SYSTEM     [id number][P]     Color, UA   Straw       Creatinine  0.7        Differential Method Automated Automated  Automated      Direct Sameer (DORA)          DISPENSE STATUS     ISSUED[P]     eGFR if   >60.0        eGFR if non   >60.0  Comment:  Calculation used to obtain the estimated glomerular filtration  rate (eGFR) is the CKD-EPI equation.           Eos # 0.1 0.1  0.1      Eosinophil% 1.5 3.3  2.4      Large/Giant Platelets  Present        Glucose  85        Glucose, UA   Negative       Gran # (ANC) 2.9 1.6(L)  2.2      Gran% 62.4 44.9  52.2      Group & Rh          Haptoglobin 122         Hematocrit 30.3(L) 20.6(L)  23.8(L)      Hemoglobin 9.0(L) 5.9  Comment:  HGB critical result(s) called and verbal readback obtained from   GIDEON MADISON RN, 02/22/2018 08:15  (LL)  6.9(L)      INDIRECT SAMEER          Ketones, UA   Negative       LD  161  Comment:  Results are increased in hemolyzed samples.         Leukocytes, UA   Negative       Lymph # 1.2 1.5  1.4      Lymph% 24.9 39.7  34.0      MCH 21.5(L) 20.7(L)  21.3(L)      MCHC 29.7(L) 28.6(L)  29.0(L)      MCV 73(L) 72(L)  74(L)      Mono # 0.5 0.4  0.4      Mono% 9.9 10.7  10.2      MPV 10.1 10.6  10.3      Nitrite, UA   Negative       nRBC 0 0  0      Occult Blood UA   Negative       pH, UA   6.0       Platelet Estimate  Appears normal        Platelets 292 276  313      Potassium  4.3        PRODUCT CODE     E1554K61[P]     Protein, UA   Negative  Comment:  Recommend a 24 hour urine protein or a urine   protein/creatinine ratio if globulin induced proteinuria is  clinically suspected.         RBC 4.18 2.85(L)  3.24(L)      RDW 25.5(H) 23.8(H)  25.0(H)      Sodium  139        Specific Waka, UA   1.005       Specimen UA   Urine, Clean Catch       UNIT NUMBER     [id number][P]     Urobilinogen, UA   Negative       WBC 4.65 3.65(L)  4.12                  02/21/18  2030      Immature Granulocytes      Immature Grans (Abs)      Unit Blood Type Code 5100[P]      5100[P]      5100[P]     Unit Expiration [id number][P]      [id number][P]      [id number][P]     Unit Blood Type O POS[P]      O POS[P]      O POS[P]     Anion Gap      Antibody Identification      Appearance, UA      Baso #      Basophil%      Bilirubin (UA)      BUN, Bld      Calcium      Chloride      CO2      CODING SYSTEM [id number][P]      OIAN683[P]      NXJA847[P]     Color, UA      Creatinine      Differential Method      Direct Papo (DORA) POS  Comment:  @02/22/18 10:03 by PCT:  The patient's DORA is strongly reactive.  The patient's cells are coated  with both IGG (3+) and Complement (weakly positive).  An elution, prepared  from the patient's cells, is strongly reactive with all cells tested.       DISPENSE STATUS CROSSMATCHED[P]      CROSSMATCHED[P]      ISSUED[P]     eGFR if       eGFR if non        Eos #      Eosinophil%      Large/Giant Platelets      Glucose      Glucose, UA      Gran # (ANC)      Gran%      Group & Rh O POS     Haptoglobin      Hematocrit      Hemoglobin      INDIRECT SAMEER POS     Ketones, UA      LD      Leukocytes, UA      Lymph #      Lymph%      MCH      MCHC      MCV      Mono #      Mono%      MPV      Nitrite, UA      nRBC      Occult Blood UA      pH, UA      Platelet Estimate      Platelets      Potassium      PRODUCT CODE [id number][P]      O5108C92[P]      S6258S92[P]     Protein, UA      RBC      RDW      Sodium      Specific Gravity, UA      Specimen UA      UNIT NUMBER [id number][P]      [id number][P]      [id number][P]     Urobilinogen, UA      WBC            Significant Imaging: none

## 2018-02-23 NOTE — TELEPHONE ENCOUNTER
Please schedule gastro consult for severe iron deficiency anemia, as well as weekly cbc x 2 and f/u with Dr. Ayoub and myself in a month. Thanks SARINA

## 2018-02-25 LAB
BLD PROD TYP BPU: NORMAL
BLD PROD TYP BPU: NORMAL
BLOOD UNIT EXPIRATION DATE: NORMAL
BLOOD UNIT EXPIRATION DATE: NORMAL
BLOOD UNIT TYPE CODE: 5100
BLOOD UNIT TYPE CODE: 5100
BLOOD UNIT TYPE: NORMAL
BLOOD UNIT TYPE: NORMAL
CODING SYSTEM: NORMAL
CODING SYSTEM: NORMAL
DISPENSE STATUS: NORMAL
DISPENSE STATUS: NORMAL
TRANS ERYTHROCYTES VOL PATIENT: NORMAL ML
TRANS ERYTHROCYTES VOL PATIENT: NORMAL ML

## 2018-03-01 DIAGNOSIS — K21.9 GASTROESOPHAGEAL REFLUX DISEASE, ESOPHAGITIS PRESENCE NOT SPECIFIED: ICD-10-CM

## 2018-03-01 DIAGNOSIS — M34.9 SCLERODERMA: Primary | ICD-10-CM

## 2018-03-01 RX ORDER — OMEPRAZOLE 40 MG/1
40 CAPSULE, DELAYED RELEASE ORAL
Qty: 90 CAPSULE | Refills: 3 | Status: SHIPPED | OUTPATIENT
Start: 2018-03-01 | End: 2018-09-04

## 2018-03-05 ENCOUNTER — OFFICE VISIT (OUTPATIENT)
Dept: INTERNAL MEDICINE | Facility: CLINIC | Age: 68
End: 2018-03-05
Payer: MEDICARE

## 2018-03-05 VITALS
SYSTOLIC BLOOD PRESSURE: 145 MMHG | BODY MASS INDEX: 24.72 KG/M2 | WEIGHT: 144.81 LBS | TEMPERATURE: 99 F | HEIGHT: 64 IN | HEART RATE: 93 BPM | DIASTOLIC BLOOD PRESSURE: 62 MMHG

## 2018-03-05 DIAGNOSIS — R13.10 DYSPHAGIA, UNSPECIFIED TYPE: ICD-10-CM

## 2018-03-05 DIAGNOSIS — M34.9 SCLERODERMA: ICD-10-CM

## 2018-03-05 DIAGNOSIS — D50.8 OTHER IRON DEFICIENCY ANEMIA: Primary | ICD-10-CM

## 2018-03-05 DIAGNOSIS — K21.9 GASTROESOPHAGEAL REFLUX DISEASE, ESOPHAGITIS PRESENCE NOT SPECIFIED: ICD-10-CM

## 2018-03-05 PROCEDURE — 99999 PR PBB SHADOW E&M-EST. PATIENT-LVL III: CPT | Mod: PBBFAC,,, | Performed by: INTERNAL MEDICINE

## 2018-03-05 PROCEDURE — 99214 OFFICE O/P EST MOD 30 MIN: CPT | Mod: S$PBB,,, | Performed by: INTERNAL MEDICINE

## 2018-03-05 PROCEDURE — 99213 OFFICE O/P EST LOW 20 MIN: CPT | Mod: PBBFAC,PO | Performed by: INTERNAL MEDICINE

## 2018-03-06 ENCOUNTER — HOSPITAL ENCOUNTER (OUTPATIENT)
Dept: PULMONOLOGY | Facility: CLINIC | Age: 68
Discharge: HOME | End: 2018-03-06
Payer: MEDICARE

## 2018-03-06 ENCOUNTER — OFFICE VISIT (OUTPATIENT)
Dept: WOUND CARE | Facility: CLINIC | Age: 68
End: 2018-03-06
Payer: MEDICARE

## 2018-03-06 VITALS
WEIGHT: 145.31 LBS | TEMPERATURE: 97 F | BODY MASS INDEX: 25.75 KG/M2 | SYSTOLIC BLOOD PRESSURE: 111 MMHG | HEIGHT: 63 IN | HEART RATE: 73 BPM | DIASTOLIC BLOOD PRESSURE: 57 MMHG

## 2018-03-06 DIAGNOSIS — J84.9 ILD (INTERSTITIAL LUNG DISEASE): ICD-10-CM

## 2018-03-06 DIAGNOSIS — L97.529 SKIN ULCERS OF BOTH FEET: Primary | ICD-10-CM

## 2018-03-06 DIAGNOSIS — M34.89 CUTANEOUS SCLERODERMA: ICD-10-CM

## 2018-03-06 DIAGNOSIS — K21.9 GASTROESOPHAGEAL REFLUX DISEASE, ESOPHAGITIS PRESENCE NOT SPECIFIED: ICD-10-CM

## 2018-03-06 DIAGNOSIS — L97.519 SKIN ULCERS OF BOTH FEET: Primary | ICD-10-CM

## 2018-03-06 DIAGNOSIS — M34.9 SCLERODERMA: ICD-10-CM

## 2018-03-06 LAB
PRE FEV1 FVC: 79
PRE FEV1: 1.52
PRE FVC: 1.93
PREDICTED FEV1 FVC: 78
PREDICTED FEV1: 2.38
PREDICTED FVC: 3.03

## 2018-03-06 PROCEDURE — 94729 DIFFUSING CAPACITY: CPT | Mod: 26,S$PBB,, | Performed by: INTERNAL MEDICINE

## 2018-03-06 PROCEDURE — 94010 BREATHING CAPACITY TEST: CPT | Mod: 26,S$PBB,, | Performed by: INTERNAL MEDICINE

## 2018-03-06 PROCEDURE — 94727 GAS DIL/WSHOT DETER LNG VOL: CPT | Mod: PBBFAC | Performed by: INTERNAL MEDICINE

## 2018-03-06 PROCEDURE — 99215 OFFICE O/P EST HI 40 MIN: CPT | Mod: PBBFAC,25 | Performed by: NURSE PRACTITIONER

## 2018-03-06 PROCEDURE — 99999 PR PBB SHADOW E&M-EST. PATIENT-LVL V: CPT | Mod: PBBFAC,,, | Performed by: NURSE PRACTITIONER

## 2018-03-06 PROCEDURE — 99212 OFFICE O/P EST SF 10 MIN: CPT | Mod: S$PBB,,, | Performed by: NURSE PRACTITIONER

## 2018-03-06 PROCEDURE — 94729 DIFFUSING CAPACITY: CPT | Mod: PBBFAC | Performed by: INTERNAL MEDICINE

## 2018-03-06 PROCEDURE — 94010 BREATHING CAPACITY TEST: CPT | Mod: PBBFAC | Performed by: INTERNAL MEDICINE

## 2018-03-06 PROCEDURE — 94727 GAS DIL/WSHOT DETER LNG VOL: CPT | Mod: 26,S$PBB,, | Performed by: INTERNAL MEDICINE

## 2018-03-06 NOTE — PROGRESS NOTES
Subjective:       Patient ID: Yamel Shah is a 67 y.o. female.    Chief Complaint: Wound Check    Wound Check     This patient is seen today for reevaluation of ulcers to both feet.  The ulcers on the left foot started in January 2015 and the one on the right foot began the end of March 2015.  She is using medihoney gel gel daily on the wounds and covering with a hydrofiber rope dressing.  The wounds are healing slowly as evidenced by increased granulation and wound contracture.  All of her wounds are full-thickness.  In the past she has had pulsed Veletri infusions which have helped temporarily.  She is afebrile.  She denies increased swelling, redness or purulent drainage.  Her wound healing is complicated by scleroderma and venous insufficiency.  Her pain level is 8/10.  Review of Systems   Constitutional: Negative for chills, diaphoresis and fever.   HENT: Positive for tinnitus (left hear). Negative for hearing loss, postnasal drip, rhinorrhea, sinus pressure, sneezing, sore throat and trouble swallowing.    Eyes: Negative for visual disturbance.   Respiratory: Negative for cough, shortness of breath and wheezing.    Cardiovascular: Negative for chest pain, palpitations and leg swelling.   Gastrointestinal: Negative for constipation, diarrhea, nausea and vomiting.   Genitourinary: Negative for difficulty urinating, dysuria and hematuria.        Urinary incontinence   Musculoskeletal: Negative for arthralgias, back pain and joint swelling.   Skin: Positive for wound.   Neurological: Negative for dizziness, light-headedness and headaches.   Hematological: Bruises/bleeds easily.   Psychiatric/Behavioral: Positive for sleep disturbance. Negative for decreased concentration. The patient is not nervous/anxious.        Objective:      Physical Exam   Constitutional: She is oriented to person, place, and time. She appears well-developed and well-nourished. No distress.   HENT:   Head: Normocephalic and  atraumatic.   Neck: Normal range of motion.   Cardiovascular: Intact distal pulses.    Pulmonary/Chest: Effort normal. No respiratory distress.   Musculoskeletal: Normal range of motion. She exhibits no edema or tenderness.        Feet:    Neurological: She is alert and oriented to person, place, and time.   Skin: Skin is warm and dry. No rash noted. She is not diaphoretic. No cyanosis or erythema. Nails show no clubbing.   Psychiatric: She has a normal mood and affect. Her behavior is normal. Judgment and thought content normal.   Nursing note and vitals reviewed.      Assessment:       1. Skin ulcers of both feet    2. Cutaneous scleroderma        Plan:           Skin protectant to periwound area.  Medihoney gel to bilateral foot full-thickness ulcers and cover with hydrofiber.  Hydrofiber and gauze in between toes.  Pad both feet with ABD pads for cushioning.  Cover with gauze and secure with roll gauze.  Return to clinic in 4 weeks.    Right dorsal foot        Left dorsal/lateral foot        Left medial second toe        Right lateral ankle      Right medial ankle

## 2018-03-06 NOTE — PATIENT INSTRUCTIONS
Skin protectant to periwound area.  Medihoney gel to bilateral foot full-thickness ulcers and cover with hydrofiber.  Hydrofiber and gauze in between toes.  Pad both feet with ABD pads for cushioning.  Cover with gauze and secure with roll gauze.      The supply company is [employer].  The telephone number is [phone].

## 2018-03-07 ENCOUNTER — TELEPHONE (OUTPATIENT)
Dept: RHEUMATOLOGY | Facility: CLINIC | Age: 68
End: 2018-03-07

## 2018-03-07 NOTE — TELEPHONE ENCOUNTER
Mirian, please schedule pt with Nahum and myself last week of March or first week of April Thanks SARINA

## 2018-03-07 NOTE — TELEPHONE ENCOUNTER
----- Message from Luis Ahuja MD sent at 3/7/2018  9:06 AM CST -----  Please tell pt the spirometry shows normal airflow. SARINA

## 2018-03-12 ENCOUNTER — OFFICE VISIT (OUTPATIENT)
Dept: VASCULAR SURGERY | Facility: CLINIC | Age: 68
End: 2018-03-12
Payer: MEDICARE

## 2018-03-12 VITALS
SYSTOLIC BLOOD PRESSURE: 126 MMHG | HEIGHT: 66 IN | TEMPERATURE: 98 F | WEIGHT: 142.88 LBS | HEART RATE: 81 BPM | BODY MASS INDEX: 22.96 KG/M2 | DIASTOLIC BLOOD PRESSURE: 61 MMHG

## 2018-03-12 DIAGNOSIS — I87.2 VENOUS INSUFFICIENCY OF BOTH LOWER EXTREMITIES: Primary | ICD-10-CM

## 2018-03-12 PROCEDURE — 99999 PR PBB SHADOW E&M-EST. PATIENT-LVL III: CPT | Mod: PBBFAC,,, | Performed by: SURGERY

## 2018-03-12 PROCEDURE — 99213 OFFICE O/P EST LOW 20 MIN: CPT | Mod: PBBFAC | Performed by: SURGERY

## 2018-03-12 PROCEDURE — 99214 OFFICE O/P EST MOD 30 MIN: CPT | Mod: S$PBB,,, | Performed by: SURGERY

## 2018-03-12 NOTE — PROGRESS NOTES
Yamel Shah  03/12/2018    HPI:  Patient is a 67 y.o. female with PMH of scleroderma (recent rx with iv veletri / prostaglandin) who is here today for f/u -  She presents with bilateral lower extremity venous ulcers, these began in February 2015; healed in summer 2015.   She also reports abut a 10 year history of venous ulcers, which in the past have always responded to compression dressings. She did have a 1 year period of time immediately preceding this most recent episode where she did not have ulcers.  Now has toe ulcers thought to be from sclerodema  - s/p recent iv veletri    No MI/stroke  Tobacco use: no  History of DVT/PE: no    Pain interfers with daily activities; has attempted elevation and analgesics with minimal relief and pain persists.    Past Medical History:   Diagnosis Date    Abnormal Pap smear     Acid reflux     Allergy     Arthritis     GERD (gastroesophageal reflux disease)     History of migraine headaches     Hypertension     Idiopathic neuropathy 7/20/2012    ILD (interstitial lung disease) 11/6/2013    Iron deficiency anemia 3/18/2014    MRSA carrier     Osteopenia     Pulmonary fibrosis     Pulmonary hypertension     Raynaud's disease     Scleroderma, diffuse     Sjogren's syndrome     Vitamin D deficiency 11/14/2013     Past Surgical History:   Procedure Laterality Date    BREAST BIOPSY      Left, benign    CERVICAL CONIZATION   W/ LASER  1970    DILATION AND CURETTAGE OF UTERUS      HYSTERECTOMY  1990    FRANDY (AUB, Fibroids), ovaries remain     Family History   Problem Relation Age of Onset    Breast cancer Mother     Stroke Father     Breast cancer Sister     Breast cancer Maternal Aunt     Melanoma Neg Hx     Colon cancer Neg Hx     Crohn's disease Neg Hx     Stomach cancer Neg Hx     Ulcerative colitis Neg Hx     Rectal cancer Neg Hx     Irritable bowel syndrome Neg Hx     Esophageal cancer Neg Hx     Celiac disease Neg Hx      Social  History     Social History    Marital status:      Spouse name: Lewis    Number of children: 4    Years of education: N/A     Occupational History    -retired [employer]     Retired     Social History Main Topics    Smoking status: Never Smoker    Smokeless tobacco: Never Used    Alcohol use 0.0 oz/week      Comment: ocassional glass of wine    Drug use: No    Sexual activity: Yes     Partners: Male     Other Topics Concern    Are You Pregnant Or Think You May Be? No    Breast-Feeding No     Social History Narrative         Current Outpatient Prescriptions on File Prior to Visit   Medication Sig    albuterol 90 mcg/actuation inhaler Inhale 2 puffs into the lungs every 4 (four) hours as needed for Wheezing.    desonide (DESOWEN) 0.05 % cream Apply topically 2 (two) times daily.    ergocalciferol (ERGOCALCIFEROL) 50,000 unit Cap Take 1 capsule (50,000 Units total) by mouth every 7 days.    ferrous sulfate 325 (65 FE) MG EC tablet Take 1 tablet (325 mg total) by mouth 3 (three) times daily.    gabapentin (NEURONTIN) 300 MG capsule Take 2 in the morning, 1 pill in the afternoon, and 3 pills at night.    multivitamin capsule Take 1 capsule by mouth once daily.     NIFEdipine (ADALAT CC) 90 MG TbSR TAKE 1 TABLET(90 MG) BY MOUTH EVERY DAY    omeprazole (PRILOSEC) 40 MG capsule Take 1 capsule (40 mg total) by mouth before breakfast.    PATADAY 0.2 % Drop 1 drop once daily.     pravastatin (PRAVACHOL) 20 MG tablet Take 1 tablet (20 mg total) by mouth every evening.    PREVIDENT 5000 BOOSTER PLUS 1.1 % Pste     ranitidine (ZANTAC) 150 MG capsule Take 1 capsule (150 mg total) by mouth 2 (two) times daily.    tadalafil, PULMONARY HYPERTENSION, (ADCIRCA) 20 mg Tab TAKE TWO  TABLETS (40MG) BY MOUTH ONCE DAILY. CALL [phone] TO REFILL.     No current facility-administered medications on file prior to visit.        REVIEW OF SYSTEMS:  General: negative;  ENT: negative; Allergy and Immunology: negative; Hematological and Lymphatic: Negative; Endocrine: negative; Respiratory: no cough, shortness of breath, or wheezing; Cardiovascular: no chest pain or dyspnea on exertion; Gastrointestinal: no abdominal pain/back, change in bowel habits, or bloody stools; Genito-Urinary: no dysuria, trouble voiding, or hematuria; Musculoskeletal: Leg discomfort secondary to chronic venous insufficiency; Neurological: no TIA or stroke symptoms    PHYSICAL EXAM:   Right Arm BP - Sittin/61 (18 1451)  Left Arm BP - Sittin/58 (18 1451)  Pulse: 81  Temp: 98.2 °F (36.8 °C)    General appearance:  Alert, well-appearing, and in no distress.  Oriented to person, place, and time   Neurological: Normal speech, no focal findings noted; CN II - XII grossly intact           Musculoskeletal: Digits/nail without cyanosis/clubbing.  Normal muscle strength/tone.                 Neck: Supple, no significant adenopathy; thyroid is not enlarged                  Carotid bruits cannot be auscultated                Chest:  Clear to auscultation, no wheezes, rales or rhonchi, symmetric air entry     No use of accessory muscles             Cardiac: Normal rate and regular rhythm, S1 and S2 normal; PMI non-displaced          Abdomen: Soft, nontender, nondistended, no masses or organomegaly     No rebound tenderness noted; bowel sounds normal     Pulsatile aortic mass is not palpable.      Extremities:   2+ femoral pulses bilaterally      2+ pedal pulses palpable.     No pedal edema     Edema/leg swellin+ bilateral leg     Hyperpigmentation: bilateral leg.     R medial and lateral ulcers; R dorsal ulcers    LAB RESULTS:  Lab Results   Component Value Date    K 4.3 2018    K 4.2 2018    K 4.2 2018    CREATININE 0.7 2018    CREATININE 0.7 2018    CREATININE 0.7 2018     Lab Results   Component Value Date    WBC 5.51 2018    WBC 4.65 2018     WBC 3.65 (L) 02/22/2018    HCT 31.9 (L) 03/06/2018    HCT 30.3 (L) 02/22/2018    HCT 20.6 (L) 02/22/2018     03/06/2018     02/22/2018     02/22/2018     No results found for: HGBA1C  IMAGING:  Venous U/S:  R GSV: 5.4 mm  L GSV: 5.8 mm    R SSV: 4.2 mm  L SSV:3.9 mm  No DVT    IMP/PLAN:  67 y.o. female with h/o scleroderma and bilateral chronic venous insufficiency - CEAP C5 bilaterally  Though the location and character of these ulcers is not classic findings for venous ulcers, though she has edema x2 and healed / previous venous stasis ulcers - she seeks any help to prevent recurrence    With her history of scleroderma this may be more likely related to a vasculitis of the L foot  She does have good palpable DP pulses / no PAD  Could plan for R GSV evlt in future; ideally wait for the medial and lateral ulcers to heal so she can be ambulatory post-procedure; she agrees and understands bc currently not ambulating much    Timmy Claudio MD FACS  Vascular/Endovascular Surgery

## 2018-03-12 NOTE — LETTER
March 12, 2018      Nahum Ayoub MD  [address]  [place] [place] [facility] [address]           [facility]  [address]  [place] [address]  Phone: [phone]  Fax: [phone]          Patient: Yamel Shah   MR Number: [id number]   YOB: 1950   Date of Visit: 3/12/2018       Dear Dr. Nahum Ayoub:    Thank you for referring Yamel Shah to me for evaluation. Attached you will find relevant portions of my assessment and plan of care.    If you have questions, please do not hesitate to call me. I look forward to following Yamel Shah along with you.    Sincerely,    Timmy Claudio MD    Enclosure  CC:  No Recipients    If you would like to receive this communication electronically, please contact [email or web address] or [phone] to request more information on [employer] access.    For providers and/or their staff who would like to refer a patient to [facility], please contact us through our one-stop-shop provider referral line, [facility], at [phone].    If you feel you have received this communication in error or would no longer like to receive these types of communications, please e-mail [email or web address]

## 2018-03-12 NOTE — PROGRESS NOTES
Subjective:       Patient ID: Yamel Shah is a 67 y.o. female.    Chief Complaint: Follow-up (hospital)    HPI   The patient presents for follow-up posthospitalization for anemia.  The patient was transfused 2 units of packed red blood cells for symptomatic anemia.  She was found to be iron deficient.  Symptoms of weakness and lightheadedness have improved.  She feels her appetite is good but she does have problems with swallowing.  Food often gets stuck after swallowing.  These symptoms have been present for the past 2-3 months.  Intermittent symptoms of acid reflux or ulcer noted.  She rarely experiences aspiration.  She denies having any gross rectal bleeding.  The patient is followed regularly by her rheumatologist for scleroderma.      Review of Systems   Constitutional: Positive for fatigue and unexpected weight change. Negative for activity change and appetite change.   HENT: Positive for trouble swallowing.    Eyes: Negative for visual disturbance.   Respiratory: Negative for cough and shortness of breath.    Cardiovascular: Negative for chest pain, palpitations and leg swelling.   Gastrointestinal: Negative for abdominal pain, anal bleeding, blood in stool, constipation and diarrhea.   Genitourinary: Negative for dysuria and hematuria.   Musculoskeletal: Positive for arthralgias. Negative for neck pain and neck stiffness.   Skin: Negative for rash.   Neurological: Negative for dizziness, syncope and headaches.   Psychiatric/Behavioral: Negative for sleep disturbance.        Sleepiness is noted related to use of gabapentin.       Objective:      Physical Exam   Constitutional: She is oriented to person, place, and time. She appears well-developed and well-nourished. No distress.   HENT:   Head: Normocephalic and atraumatic.   Eyes: Conjunctivae and EOM are normal. No scleral icterus.   Neck: Normal range of motion. Neck supple. No JVD present. No thyromegaly present.   Cardiovascular: Normal rate,  regular rhythm, normal heart sounds and intact distal pulses.  Exam reveals no gallop and no friction rub.    No murmur heard.  Pulmonary/Chest: Effort normal and breath sounds normal. No respiratory distress. She has no wheezes. She has no rales.   Abdominal: Soft. Bowel sounds are normal. She exhibits no mass. There is no tenderness.   Musculoskeletal: She exhibits no tenderness.   Sclerodactyly is noted involving both hands.  No hand ulcerations are present.   Lymphadenopathy:     She has no cervical adenopathy.   Neurological: She is alert and oriented to person, place, and time.   Skin: Skin is warm and dry. No rash noted.   Nursing note and vitals reviewed.      Assessment:       1. Other iron deficiency anemia    2. Dysphagia, unspecified type    3. Gastroesophageal reflux disease, esophagitis presence not specified    4. Scleroderma        Plan:     Yamel was seen today for follow-up.  The patient was advised that she may add ranitidine at bedtime since this has provided symptomatic relief before.  She will continue with Prilosec every morning.  She does not wish to use it twice daily at this time.  An EGD is pending.  She is due to follow up with GI.  Colonoscopy will be ordered.  The patient may need.  Capsule endoscopy if standard endoscopy is negative.  Blood tests will be repeated in 2 weeks.  Routine medical follow-up visit in 3 months is recommended.    Diagnoses and all orders for this visit:    Other iron deficiency anemia  -     Cancel: Case request GI: COLONOSCOPY    Dysphagia, unspecified type    Gastroesophageal reflux disease, esophagitis presence not specified    Scleroderma  -     CBC auto differential; Future  -     Comprehensive metabolic panel; Future    Other orders  -     ranitidine (ZANTAC) 150 MG capsule; Take 1 capsule (150 mg total) by mouth 2 (two) times daily.

## 2018-03-15 ENCOUNTER — TELEPHONE (OUTPATIENT)
Dept: WOUND CARE | Facility: CLINIC | Age: 68
End: 2018-03-15

## 2018-03-15 NOTE — TELEPHONE ENCOUNTER
----- Message from Henry Fraga sent at 3/14/2018  4:53 PM CDT -----  Pt is asking for a call back regarding medical supplies. Pt said she was sent one wrong item. Please call pt regarding this at [phone]

## 2018-03-16 ENCOUNTER — TELEPHONE (OUTPATIENT)
Dept: WOUND CARE | Facility: CLINIC | Age: 68
End: 2018-03-16

## 2018-03-16 NOTE — TELEPHONE ENCOUNTER
----- Message from Estee Hernadez sent at 3/16/2018 11:17 AM CDT -----  Contact: self   Yamel States that she needs a call returned by a nurse in reference to some wound care supplies but it was the incorrect order. Please call Yamel @  [phone]. Thanks :)

## 2018-03-16 NOTE — TELEPHONE ENCOUNTER
----- Message from Henry Fraga sent at 3/16/2018 11:56 AM CDT -----  Pt is returning missed phone call regarding the incorrect supplies she received. Please call pt at [phone]

## 2018-03-17 ENCOUNTER — LAB VISIT (OUTPATIENT)
Dept: LAB | Facility: HOSPITAL | Age: 68
End: 2018-03-17
Attending: INTERNAL MEDICINE
Payer: MEDICARE

## 2018-03-17 DIAGNOSIS — D50.0 IRON DEFICIENCY ANEMIA DUE TO CHRONIC BLOOD LOSS: ICD-10-CM

## 2018-03-17 LAB
ANISOCYTOSIS BLD QL SMEAR: ABNORMAL
BASOPHILS # BLD AUTO: 0.03 K/UL
BASOPHILS NFR BLD: 0.5 %
DIFFERENTIAL METHOD: ABNORMAL
EOSINOPHIL # BLD AUTO: 0 K/UL
EOSINOPHIL NFR BLD: 0.7 %
ERYTHROCYTE [DISTWIDTH] IN BLOOD BY AUTOMATED COUNT: 34.1 %
HCT VFR BLD AUTO: 37 %
HGB BLD-MCNC: 11.4 G/DL
IMM GRANULOCYTES # BLD AUTO: 0.01 K/UL
IMM GRANULOCYTES NFR BLD AUTO: 0.2 %
LYMPHOCYTES # BLD AUTO: 1.4 K/UL
LYMPHOCYTES NFR BLD: 24.8 %
MCH RBC QN AUTO: 34.5 PG
MCHC RBC AUTO-ENTMCNC: 30.8 G/DL
MCV RBC AUTO: ABNORMAL FL
MONOCYTES # BLD AUTO: 0.3 K/UL
MONOCYTES NFR BLD: 5.8 %
NEUTROPHILS # BLD AUTO: 3.9 K/UL
NEUTROPHILS NFR BLD: 68 %
NRBC BLD-RTO: 0 /100 WBC
PLATELET # BLD AUTO: 238 K/UL
PLATELET BLD QL SMEAR: ABNORMAL
PMV BLD AUTO: 11.4 FL
POIKILOCYTOSIS BLD QL SMEAR: SLIGHT
RBC # BLD AUTO: 3.3 M/UL
RBC AGGLUT BLD QL: PRESENT
WBC # BLD AUTO: 5.69 K/UL

## 2018-03-17 PROCEDURE — 85025 COMPLETE CBC W/AUTO DIFF WBC: CPT

## 2018-03-17 PROCEDURE — 36415 COLL VENOUS BLD VENIPUNCTURE: CPT | Mod: PO

## 2018-03-22 DIAGNOSIS — M34.9 SCLERODERMA: ICD-10-CM

## 2018-03-22 DIAGNOSIS — I27.20 PULMONARY HYPERTENSION: ICD-10-CM

## 2018-03-22 DIAGNOSIS — J84.9 ILD (INTERSTITIAL LUNG DISEASE): ICD-10-CM

## 2018-03-22 RX ORDER — TADALAFIL 20 MG/1
TABLET ORAL
Qty: 60 TABLET | Refills: 11 | Status: SHIPPED | OUTPATIENT
Start: 2018-03-22 | End: 2019-06-14 | Stop reason: SDUPTHER

## 2018-03-23 ENCOUNTER — OFFICE VISIT (OUTPATIENT)
Dept: GASTROENTEROLOGY | Facility: CLINIC | Age: 68
End: 2018-03-23
Payer: MEDICARE

## 2018-03-23 VITALS
WEIGHT: 144.38 LBS | HEART RATE: 68 BPM | DIASTOLIC BLOOD PRESSURE: 65 MMHG | SYSTOLIC BLOOD PRESSURE: 132 MMHG | BODY MASS INDEX: 24.06 KG/M2 | HEIGHT: 65 IN

## 2018-03-23 DIAGNOSIS — K21.9 GASTROESOPHAGEAL REFLUX DISEASE WITHOUT ESOPHAGITIS: ICD-10-CM

## 2018-03-23 DIAGNOSIS — R15.1 FECAL SOILING: ICD-10-CM

## 2018-03-23 DIAGNOSIS — D50.9 IRON DEFICIENCY ANEMIA, UNSPECIFIED IRON DEFICIENCY ANEMIA TYPE: Primary | ICD-10-CM

## 2018-03-23 DIAGNOSIS — R13.19 ESOPHAGEAL DYSPHAGIA: ICD-10-CM

## 2018-03-23 PROCEDURE — 99214 OFFICE O/P EST MOD 30 MIN: CPT | Mod: S$PBB,,, | Performed by: NURSE PRACTITIONER

## 2018-03-23 PROCEDURE — 99999 PR PBB SHADOW E&M-EST. PATIENT-LVL IV: CPT | Mod: PBBFAC,,, | Performed by: NURSE PRACTITIONER

## 2018-03-23 PROCEDURE — 99214 OFFICE O/P EST MOD 30 MIN: CPT | Mod: PBBFAC | Performed by: NURSE PRACTITIONER

## 2018-03-23 NOTE — LETTER
March 23, 2018      Nahum Ayoub MD  [address] [facility] [address]           [facility] - Gastroenterology  [address]  [facility] [address]  Phone: [phone]  Fax: [phone]          Patient: Yamel Shah   MR Number: [id number]   YOB: 1950   Date of Visit: 3/23/2018       Dear Dr. Nahum Ayoub:    Thank you for referring Yamel Shah to me for evaluation. Attached you will find relevant portions of my assessment and plan of care.    If you have questions, please do not hesitate to call me. I look forward to following Yamel Shah along with you.    Sincerely,    Jeannette Pringle, SEAN    Enclosure  CC:  No Recipients    If you would like to receive this communication electronically, please contact [email or web address] or [phone] to request more information on [facility] [facility] access.    For providers and/or their staff who would like to refer a patient to [facility], please contact us through our one-stop-shop provider referral line, [facility] , at [phone].    If you feel you have received this communication in error or would no longer like to receive these types of communications, please e-mail [email or web address]

## 2018-03-23 NOTE — PROGRESS NOTES
[facility] Established Patient Visit    Reason for Visit:  The primary encounter diagnosis was Iron deficiency anemia, unspecified iron deficiency anemia type. Diagnoses of Esophageal dysphagia, Gastroesophageal reflux disease without esophagitis, and Fecal soiling were also pertinent to this visit.    PCP: Aly Rand    HPI:  Anemia    Onset-chronic with recent worsening. Admitted to the hospital from 2/21-2/22/18 with fatigue and drop in hgb/hcrt to 6.4/22.5. She received 2 u of PRBC. GI was consulted and advised out pt EGD/colon for further eval  SOB-no  CP-no  Increased fatigue-improving. Still some fatigue  Syncope/near syncope-none  Dizziness/ light headedness-none  PICA-was craving ice prior to blood transfusion  GI Bleeding-none. Dark stools s/p PO iron initially, none in a while. Take PO iron TID since hospital d/c  Previous studies and /or eval per GI-[id number]    Lab Results   Component Value Date    IRON 12 (L) 02/21/2018    TIBC 521 (H) 02/21/2018    FERRITIN 3 (L) 02/21/2018     Lab Results   Component Value Date    WBC 5.69 03/17/2018    HGB 11.4 (L) 03/17/2018    HCT 37.0 03/17/2018    MCV SEE COMMENT 03/17/2018     03/17/2018     Abdominal pain-denies  Reflux - + hx GERD. Tastes acid three days weekly. 40 mg omeprazole once daily 1 hour b/f breakfast x multiple years. She also takes 150 mg Zantac PRN HS for night time s/s.   Dysphagia/odynophagia - +hx dysphagia. Intermittent. Lately multiple days weekly. Solids get stuck in mid esophagus. Some improvement if she chews thoroughly. No problems with liquids. materials eventually pass after about 30 minutes. Washing with liquids does not help. Has not had to go to ED for this. No problems initiating swallow.   Bowel habits - 1 formed BM/day.  2-3 days per week will pass mucus. With fecal leakage once weekly. Wears pad.    GI bleeding - denies hematochezia, hematemesis, melena, BRBPR, black/tarry stools, and coffee ground  emesis  NSAID usage - 81 mg ASA just about daily.       Previous HPI (1/2017): This is a 67 y.o. female here for f/u of GERD and dysphagia.  Ms. Shah has been previously seen in GI by JUNIOR Flores. Her last GI clinic visit was in 2014. She has a hx of esophageal candida, and GEJ polyps. She is overdue for a recommended 6 month repeat EGD. She has a hx notable for scleroderma, ILD, PHTN.    ROS:  Constitutional: No fevers, no chills, No unintentional weight loss, + improving fatigue,   ENT: No allergies  CV: No chest pain, no palpitations, +  perif. edema, no sob on exertion  Pulm: No cough, no shortness of breath, + wheezes, no sputum  Ophtho: No vision changes  GI: see HPI; also no nausea, no vomiting, no change in appetite  Derm: No rash  Heme: No lymphadenopathy, No bruising  MSK: + arthritis, no muscle pain, no muscle weakness  : No dysuria, No hematuria  Endo: No hot or cold intolerance  Neuro: No syncope, No seizure,     PMHX:  has a past medical history of Abnormal Pap smear; Acid reflux; Allergy; Arthritis; GERD (gastroesophageal reflux disease); History of migraine headaches; Hypertension; Idiopathic neuropathy (7/20/2012); ILD (interstitial lung disease) (11/6/2013); Iron deficiency anemia (3/18/2014); MRSA carrier; Osteopenia; Pulmonary fibrosis; Pulmonary hypertension; Raynaud's disease; Scleroderma, diffuse; Sjogren's syndrome; and Vitamin D deficiency (11/14/2013).    PSHX:  has a past surgical history that includes Dilation and curettage of uterus; Breast biopsy; Cervical conization w/ laser (1970); and Hysterectomy (1990).    The patient's social and family histories were reviewed by me and updated in the appropriate section of the electronic medical record.    Review of patient's allergies indicates:   Allergen Reactions    Sulfa (sulfonamide antibiotics)      Other reaction(s): Rash       Current Outpatient Prescriptions   Medication Sig    ADCIRCA 20 mg Tab TAKE TWO  TABLETS (40MG) BY  "MOUTH ONCE DAILY. CALL [phone] TO REFILL.    albuterol 90 mcg/actuation inhaler Inhale 2 puffs into the lungs every 4 (four) hours as needed for Wheezing.    ergocalciferol (ERGOCALCIFEROL) 50,000 unit Cap Take 1 capsule (50,000 Units total) by mouth every 7 days.    ferrous sulfate 325 (65 FE) MG EC tablet Take 1 tablet (325 mg total) by mouth 3 (three) times daily.    gabapentin (NEURONTIN) 300 MG capsule Take 2 in the morning, 1 pill in the afternoon, and 3 pills at night.    multivitamin capsule Take 1 capsule by mouth once daily.     NIFEdipine (ADALAT CC) 90 MG TbSR TAKE 1 TABLET(90 MG) BY MOUTH EVERY DAY    omeprazole (PRILOSEC) 40 MG capsule Take 1 capsule (40 mg total) by mouth before breakfast.    PATADAY 0.2 % Drop 1 drop once daily.     pravastatin (PRAVACHOL) 20 MG tablet Take 1 tablet (20 mg total) by mouth every evening.    PREVIDENT 5000 BOOSTER PLUS 1.1 % Pste     ranitidine (ZANTAC) 150 MG capsule Take 1 capsule (150 mg total) by mouth 2 (two) times daily.    desonide (DESOWEN) 0.05 % cream Apply topically 2 (two) times daily.     No current facility-administered medications for this visit.          Objective Findings:    Vital Signs:  /65   Pulse 68   Ht 5' 5" (1.651 m)   Wt 65.5 kg (144 lb 6.4 oz)   BMI 24.03 kg/m²  Body mass index is 24.03 kg/m².    Physical Exam:   General Appearance: Well appearing in no acute distress  Head:   Normocephalic, without obvious abnormality  Eyes:    No scleral icterus, EOMI  Throat: Lips, mucosa, and tongue normal; teeth and gums normal  Lungs: CTA bilaterally in anterior and posterior fields, no wheezes, no crackles.  Heart:  Regular rate and rhythm, S1, S2 normal, no murmurs heard  Abdomen: Soft, non tender, non distended with positive bowel sounds in all four quadrants. No hepatosplenomegaly, ascites, or mass  Extremities:    2+ radial pulses, no clubbing, cyanosis. + bilateral periferal edema. Skin changes to fingers c/w hx of " scleroderma.  Skin: No rash to exposed areas  Neurologic: A&Ox4      Labs:  Lab Results   Component Value Date    WBC 5.69 03/17/2018    HGB 11.4 (L) 03/17/2018    HCT 37.0 03/17/2018     03/17/2018    CHOL 118 (L) 02/21/2018    TRIG 58 02/21/2018    HDL 54 02/21/2018    ALT 7 (L) 02/21/2018    ALT 7 (L) 02/21/2018    AST 16 02/21/2018    AST 16 02/21/2018     02/22/2018    K 4.3 02/22/2018     02/22/2018    CREATININE 0.7 02/22/2018    BUN 15 02/22/2018    CO2 22 (L) 02/22/2018    TSH 1.287 11/25/2015    INR 1.0 06/29/2015       Endoscopy:   1/26/2017 EGD Lammi: NL esophagus(-).  irrg z line (-). Nl stomach. Duodenal congestion (mild chr inflamm). Esophageal polyps (hp). Rpt 6 months to check for complete removal.   2014 EGD Dr. Gusman-GEJ polyps. bx-chronic inflammation;hyperplastic change. Repeat in 6 months for surveillance.   2014 colonoscopy Dr. Gusman - 3 mm descending colon polyp. Medium internal hemorrhoids. path- fecal matter, no human tissue present. Repeat in 5 years.  2011 EGD Ch- HH. White nummular esophageal lesions. irregular z line. Esophageal nodule. Path- mild chronic gastritis. No IM. Esophageal candida.  2011 colonoscopy Ch- sigmoid tics. Internal hemorrhoids. 4 mm ascending colon polyp. Path-hyperplastic polyp. repeat in 5 years.   2009 EGD Ch- Schatzki ring, esophogeal nodule, HH, irregular z line. Path-chronic gastritis  2007 colonoscopy Girgrah- WNL  2007 EGD Girgrah- irregular z line. Path-chronic inflammation. No IM.    Assessment:    1. Iron deficiency anemia, unspecified iron deficiency anemia type    2. Esophageal dysphagia    3. Gastroesophageal reflux disease without esophagitis    4. Fecal soiling          Recommendations:  1. ROXANNE- EGD/colon as previously ordered. VCE if unrevealing.  2. Dysphagia- EGD. Manometry if EGD unrevealing. Swallow precautions.  3. GERD- continue  omeprazole 40 mg daily. Take Zantac every HS.   4. Fiber as per handout provided.      F/u pending results.     Order summary:       Thank you for allowing me to participate in the care of JACINTA Aguilera, FNP-C

## 2018-03-23 NOTE — PATIENT INSTRUCTIONS
HIGH FIBER KEY POINTS:  1. Goal of 20-25 grams of fiber each day for women, 30-35 grams each day for men. Slowly build up to this goal as you may experience gas and bloating at first.  2. Take a fiber supplement to help reach this goal: Metamucil, Citrucel, Fibercon, Konsyl, or Psyllium  3. For Constipation: Finely ground psyllium husk (with or without flavoring or Additives)   - To start: 1 teaspoon once a day in the morning with 8 oz of liquid    followed by an additional 8 ounce glass of water   - After a week: add a second teaspoon in the middle of the day   - After two weeks: add a third teaspoon at bedtime   - Follow each dose with another glass of water. Can add a splash of juice or lemonade for taste.  3. Drink 6-8 glasses of water a day.  4. Try to do plant fiber (fruits and vegetables) over processed fibers (cereals and breads)     HIGH FIBER DIET  Fiber is present in all fruits, vegetables, cereals and grains. Fiber passes through the body undigested. A high fiber diet helps food move through the intestinal tract. The added bulk is helpful in preventing constipation. In persons with diverticulosis it serves to clean out the pouches along the colon wall while preventing new ones from forming. A high fiber diet may reduce the risk of colon cancer, decreases blood cholesterol and prevents high blood sugar in diabetics.    The foods listed below are high in fiber and should be included in your diet. If you are not used to high fiber foods, start with 1 or 2 foods from this list. Every 3-4 days add a new one to your diet until you are eating 4 high fiber foods per day. This should give you 20-35 Gm of fiber/day. It is also important to drink a lot of water when you are on this diet (6-8 glasses a day). Water causes the fiber to swell and increases the benefit.  Add Fiber to Your Diet   Adding fiber to your diet can help relieve constipation by making stools softer and easier to pass. To increase your fiber  intake, your doctor may recommend a bulking agent, such as psyllium. This is a high-fiber supplement available at most grocery and drugstores. Eating more fiber-rich foods will also help. There are two types of fiber:   Insoluble fiber is the main ingredient in bulking agents. Its also found in foods such as wheat bran, whole-grain breads, fresh fruits, and vegetables.   Soluble fiber is found in foods such as oat bran. Although soluble fiber is good for you, it may not ease constipation as much as foods high in insoluble fiber.    FOODS HIGH IN DIETARY FIBER:  BREADS: Made with 100% whole wheat flour; Kian, wheat or rye crackers; tortillas, bran muffins. Whole grains, such as wheat bran, corn bran, and brown rice.  CEREALS: Whole grain cereal with bran (Chex, Raisin Bran, Corn Bran), oatmeal, rolled oats, granola, wheat flakes, brown rice  NUTS: Any nuts  FRUITS: All fresh fruits along with edible skins, (bananas, citrus fruit, mangoes, pears, prunes, raisins, apples, pineapple, apricot, melon, jams and marmalades), fruit juices (especially prune juice)  VEGETABLES: All types, preferably raw or lightly cooked: especially, celery, eggplant, potatoes,spinach, broccoli, brussel sprouts, winter squash, carrots, cauliflower, soybeans, lentils, fresh and dried beans of all kinds  OTHER: Popcorn, any spices.   Nuts and legumes, especially peanuts, lentils, and kidney beans.  Easy Ways to Add Fiber   The tips below offer some simple ways to add more high-fiber foods to your meals.   Start your day with a high-fiber breakfast. Eat a wheat bran cereal along with a sliced banana. Or, try peanut butter on whole-wheat toast.   Eat carrot sticks for snacks. Theyre easy to prepare, taste great, and are low in calories.   Use whole-grain breads instead of white bread for sandwiches.   Eat fruits for treats. Try an apple and some raisins instead of a candy bar.  Vegetables  Artichoke, cooked 10.3  Asparagus - 2.8  Beans -  2  Broccoli, boiled 1 cup - 5.1  [place] sprouts, cooked 1 cup - 4.1  Carrots - boiled 2.5, raw 4  Celery - 1  Corn - 4  Corn on the Cob - 5.9  Lettuce - 1  Peas (canned) - 4  Peas (dried) - 7.9  Green peas (cooked) - 8.8  Potato, with skin, baked - 3.0  Spinach - 4  Sweet potato - 3.4  Turnip greens, boiled 1 cup - 5.0  Sweet corn, cooked  1 cup  4.0  Tomato paste -1/4 cup -2.7  Yam - 2.7  Legumes, Nuts, and Seeds  Split peas, cooked  1 cup  16.3  Lentils, cooked 1 cup 15.6  Black beans, cooked 1 cup 15.0  Black eyed peas (1/2 cup) 4.2  Chick peas (1/2 cup) 5  Lima beans, cooked 1 cup  13.2  Baked beans, vegetarian, canned, cooked 1 cup 10.4  Sunflower seed kernels 1/4 cup 3.9  Almonds 1 ounce (23 nuts)  3.5  Pistachio nuts 1 ounce (49 nuts) 2.9  Pecans 1 ounce (19 halves) 2.7  Beans (lima,kidney,baked) - 10 (1/2 cup)  Refried beans -12 (1cup)  Lentils - 8  Soybeans (1/2 cup) 5  Fruit  Raspberries  1 cup  8.0  Pear, with skin - 5.5  Apple, with skin 4.4 (Juice = 0)  Banana - 3.1  Orange - 3.1  Strawberries (halves) 1 cup - 3.0  Figs, dried 2 medium - 1.6  Raisins 1 ounce (60 raisins) -1.0  Grapefruit - 1.4  Peach - 2  Kiwi - 5  Geovani - 3.7  Pineapple - 2  Cereal, Grains, and Pasta  Spaghetti, whole-wheat, cooked 1 cup 6.3  Barley, pearled, cooked 1 cup 6.0  Bran flakes 3/4 cup 5.3  Oat bran muffin 1 medium 5.2  Oatmeal, instant, cooked 1 cup 4.0  Popcorn, air-popped 3 cups 3.5  Brown rice, cooked  1 cup  3.5  Bread, rye 1 slice 1.9, pumpernickel - 2.1  Bagel - 1.6  Bread, whole-wheat or multigrain  1 slice 1.9  Fiber One - 14  100% Bran - 13  Raisin Bran - 3.5  All Bran Extra Fiber - 13

## 2018-03-24 ENCOUNTER — LAB VISIT (OUTPATIENT)
Dept: LAB | Facility: HOSPITAL | Age: 68
End: 2018-03-24
Attending: INTERNAL MEDICINE
Payer: MEDICARE

## 2018-03-24 DIAGNOSIS — M34.9 SCLERODERMA: ICD-10-CM

## 2018-03-24 LAB
ALBUMIN SERPL BCP-MCNC: 3.3 G/DL
ALP SERPL-CCNC: 94 U/L
ALT SERPL W/O P-5'-P-CCNC: 9 U/L
ANION GAP SERPL CALC-SCNC: 8 MMOL/L
ANISOCYTOSIS BLD QL SMEAR: ABNORMAL
AST SERPL-CCNC: 20 U/L
BASOPHILS # BLD AUTO: 0.04 K/UL
BASOPHILS NFR BLD: 0.9 %
BILIRUB SERPL-MCNC: 0.4 MG/DL
BUN SERPL-MCNC: 15 MG/DL
CALCIUM SERPL-MCNC: 9.1 MG/DL
CHLORIDE SERPL-SCNC: 107 MMOL/L
CO2 SERPL-SCNC: 25 MMOL/L
CREAT SERPL-MCNC: 0.7 MG/DL
DACRYOCYTES BLD QL SMEAR: ABNORMAL
DIFFERENTIAL METHOD: ABNORMAL
EOSINOPHIL # BLD AUTO: 0.1 K/UL
EOSINOPHIL NFR BLD: 1.4 %
ERYTHROCYTE [DISTWIDTH] IN BLOOD BY AUTOMATED COUNT: ABNORMAL %
EST. GFR  (AFRICAN AMERICAN): >60 ML/MIN/1.73 M^2
EST. GFR  (NON AFRICAN AMERICAN): >60 ML/MIN/1.73 M^2
GLUCOSE SERPL-MCNC: 90 MG/DL
HCT VFR BLD AUTO: 35.7 %
HGB BLD-MCNC: 12.1 G/DL
HYPOCHROMIA BLD QL SMEAR: ABNORMAL
IMM GRANULOCYTES # BLD AUTO: 0.01 K/UL
IMM GRANULOCYTES NFR BLD AUTO: 0.2 %
LYMPHOCYTES # BLD AUTO: 1.6 K/UL
LYMPHOCYTES NFR BLD: 35.6 %
MCH RBC QN AUTO: 28.8 PG
MCHC RBC AUTO-ENTMCNC: 33.9 G/DL
MCV RBC AUTO: 85 FL
MONOCYTES # BLD AUTO: 0.4 K/UL
MONOCYTES NFR BLD: 7.9 %
NEUTROPHILS # BLD AUTO: 2.4 K/UL
NEUTROPHILS NFR BLD: 54 %
NRBC BLD-RTO: 0 /100 WBC
OVALOCYTES BLD QL SMEAR: ABNORMAL
PLATELET # BLD AUTO: 199 K/UL
PLATELET BLD QL SMEAR: ABNORMAL
PMV BLD AUTO: ABNORMAL FL
POIKILOCYTOSIS BLD QL SMEAR: SLIGHT
POLYCHROMASIA BLD QL SMEAR: ABNORMAL
POTASSIUM SERPL-SCNC: 4.1 MMOL/L
PROT SERPL-MCNC: 8.5 G/DL
RBC # BLD AUTO: 4.2 M/UL
SODIUM SERPL-SCNC: 140 MMOL/L
WBC # BLD AUTO: 4.41 K/UL

## 2018-03-24 PROCEDURE — 80053 COMPREHEN METABOLIC PANEL: CPT

## 2018-03-24 PROCEDURE — 36415 COLL VENOUS BLD VENIPUNCTURE: CPT | Mod: PO

## 2018-03-24 PROCEDURE — 85025 COMPLETE CBC W/AUTO DIFF WBC: CPT

## 2018-04-04 ENCOUNTER — OFFICE VISIT (OUTPATIENT)
Dept: RHEUMATOLOGY | Facility: CLINIC | Age: 68
End: 2018-04-04
Payer: MEDICARE

## 2018-04-04 VITALS
SYSTOLIC BLOOD PRESSURE: 132 MMHG | WEIGHT: 146.13 LBS | HEART RATE: 68 BPM | DIASTOLIC BLOOD PRESSURE: 70 MMHG | BODY MASS INDEX: 24.95 KG/M2 | HEIGHT: 64 IN

## 2018-04-04 DIAGNOSIS — L97.519 SKIN ULCERS OF BOTH FEET: Primary | ICD-10-CM

## 2018-04-04 DIAGNOSIS — L97.529 SKIN ULCERS OF BOTH FEET: Primary | ICD-10-CM

## 2018-04-04 DIAGNOSIS — M34.9 SCLERODERMA: ICD-10-CM

## 2018-04-04 DIAGNOSIS — E55.9 VITAMIN D DEFICIENCY: ICD-10-CM

## 2018-04-04 DIAGNOSIS — M89.9 DISORDER OF BONE: ICD-10-CM

## 2018-04-04 DIAGNOSIS — G60.9 IDIOPATHIC NEUROPATHY: ICD-10-CM

## 2018-04-04 PROCEDURE — 99215 OFFICE O/P EST HI 40 MIN: CPT | Mod: PBBFAC | Performed by: INTERNAL MEDICINE

## 2018-04-04 PROCEDURE — 99999 PR PBB SHADOW E&M-EST. PATIENT-LVL V: CPT | Mod: PBBFAC,GC,, | Performed by: INTERNAL MEDICINE

## 2018-04-04 PROCEDURE — 99214 OFFICE O/P EST MOD 30 MIN: CPT | Mod: S$PBB,GC,, | Performed by: INTERNAL MEDICINE

## 2018-04-04 RX ORDER — GABAPENTIN 300 MG/1
CAPSULE ORAL
Qty: 420 CAPSULE | Refills: 4 | Status: SHIPPED | OUTPATIENT
Start: 2018-04-04 | End: 2018-07-24

## 2018-04-04 ASSESSMENT — ROUTINE ASSESSMENT OF PATIENT INDEX DATA (RAPID3)
PAIN SCORE: 8
PATIENT GLOBAL ASSESSMENT SCORE: 6
TOTAL RAPID3 SCORE: 5.89
FATIGUE SCORE: 5
MDHAQ FUNCTION SCORE: 1.1
PSYCHOLOGICAL DISTRESS SCORE: 3.3
AM STIFFNESS SCORE: 0, NO

## 2018-04-04 NOTE — PROGRESS NOTES
Subjective:       Patient ID: Yamel Shah is a 67 y.o. female.    Chief Complaint: Disease Management    HPI   66 yo F for follow-up for systemic scleroderma, diagnosed 1983- She has been seeing Dr. Ahuja for over 10 years. She is +SCL-70, -RNAP3. She has stable ILD on imaging 9/2015 and had mild exercise induced pulmonary hypertension in 2013 that has resolved on 6/2015 RHC and repeat echo. PFTs 4/2016 show normal DLCO, RV, FEV1. Mild restriction.    Interval History:   Pt was seen in clinic on 2/22/18 and found to have a significant ROXANNE, hb 5 which required hospital admission and 2 units of pRBCs. Her anemia has resolved, she is currently on iron 325 tid. Pt will undergo gi w/u with EGD given anemia and dysphagia. 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   Mrs. Shah' only complaints today are the dysphagia and pain with the feet associated with her wounds. She feels the wounds are getting slightly better. She takes gabapentin 300 mg, 2 in the am, 1 in the afternoon, and 3 in the evening.   In the past pt's digital ulcerations required admission and treatment with epoprostenol (6/2015 and Sept/2016). She does not feel epo helped with the wounds.   She is on  adcirca 40 mg  and nifedipine 90mg daily. She did not qualify for Bosentan in the past.    She also saw vascular surgery in the past who will consider EVLT once ulcers have healed, repeat assessment revealed the same assessment.      Her breathing is unchanged but she feels her skin has improved.   Pt has seen Dr. Lim recently and is stable, no change in medications, planning for repeat echo this summer.  Her PFTs are stable but mod restriction.      Review of Systems   Constitutional: Positive for unexpected weight change. Negative for appetite change, chills, fatigue and fever.   HENT: Positive for trouble swallowing. Negative for facial swelling, mouth sores, nosebleeds and sore throat.         Dry mouth     Eyes: Negative for photophobia,  "pain and visual disturbance.   Respiratory: Negative for cough, choking, chest tightness and shortness of breath.    Cardiovascular: Negative for chest pain, palpitations and leg swelling.   Musculoskeletal: Negative for arthralgias, back pain, gait problem and joint swelling.   Skin: Positive for color change and wound. Negative for pallor and rash.   Neurological: Negative for tremors, weakness, light-headedness, numbness and headaches.         Objective:   /70   Pulse 68   Ht 5' 3.6" (1.615 m)   Wt 66.3 kg (146 lb 1.6 oz)   BMI 25.39 kg/m²      Physical Exam   Constitutional: She is well-developed, well-nourished, and in no distress.   HENT:   Mouth/Throat: Oropharynx is clear and moist.   Eyes: Conjunctivae are normal. No scleral icterus.   Neck: Normal range of motion. Neck supple.   Cardiovascular: Normal rate, normal heart sounds and intact distal pulses.    Pulmonary/Chest: Effort normal. She has rales.   Abdominal: Soft.   Skin: Skin is warm and dry. No rash noted. No erythema.     MRSS 22  Telangiectasias on the face and upper chest wall  Skin tightening b/l  Claw hand deformity. Flexion contractures b/l.   Feet bandaged---see wound care pictures.    Musculoskeletal: She exhibits deformity. She exhibits no edema or tenderness.           Labs:   Results for orders placed or performed in visit on 03/24/18   CBC auto differential   Result Value Ref Range    WBC 4.41 3.90 - 12.70 K/uL    RBC 4.20 4.00 - 5.40 M/uL    Hemoglobin 12.1 12.0 - 16.0 g/dL    Hematocrit 35.7 (L) 37.0 - 48.5 %    MCV 85 82 - 98 fL    MCH 28.8 27.0 - 31.0 pg    MCHC 33.9 32.0 - 36.0 g/dL    RDW SEE COMMENT 11.5 - 14.5 %    Platelets 199 150 - 350 K/uL    MPV SEE COMMENT 9.2 - 12.9 fL    Immature Granulocytes 0.2 0.0 - 0.5 %    Gran # (ANC) 2.4 1.8 - 7.7 K/uL    Immature Grans (Abs) 0.01 0.00 - 0.04 K/uL    Lymph # 1.6 1.0 - 4.8 K/uL    Mono # 0.4 0.3 - 1.0 K/uL    Eos # 0.1 0.0 - 0.5 K/uL    Baso # 0.04 0.00 - 0.20 K/uL    " nRBC 0 0 /100 WBC    Gran% 54.0 38.0 - 73.0 %    Lymph% 35.6 18.0 - 48.0 %    Mono% 7.9 4.0 - 15.0 %    Eosinophil% 1.4 0.0 - 8.0 %    Basophil% 0.9 0.0 - 1.9 %    Platelet Estimate Appears normal     Aniso Moderate     Poik Slight     Poly Occasional     Hypo Occasional     Ovalocytes Occasional     Tear Drop Cells Occasional     Differential Method Automated      Results for orders placed or performed in visit on 03/24/18   Comprehensive metabolic panel   Result Value Ref Range    Sodium 140 136 - 145 mmol/L    Potassium 4.1 3.5 - 5.1 mmol/L    Chloride 107 95 - 110 mmol/L    CO2 25 23 - 29 mmol/L    Glucose 90 70 - 110 mg/dL    BUN, Bld 15 8 - 23 mg/dL    Creatinine 0.7 0.5 - 1.4 mg/dL    Calcium 9.1 8.7 - 10.5 mg/dL    Total Protein 8.5 (H) 6.0 - 8.4 g/dL    Albumin 3.3 (L) 3.5 - 5.2 g/dL    Total Bilirubin 0.4 0.1 - 1.0 mg/dL    Alkaline Phosphatase 94 55 - 135 U/L    AST 20 10 - 40 U/L    ALT 9 (L) 10 - 44 U/L    Anion Gap 8 8 - 16 mmol/L    eGFR if African American >60.0 >60 mL/min/1.73 m^2    eGFR if non African American >60.0 >60 mL/min/1.73 m^2     Results for orders placed or performed in visit on 06/01/16   Vitamin D   Result Value Ref Range    Vit D, 25-Hydroxy 53 30 - 96 ng/mL     No results found for this or any previous visit.  No results found for this or any previous visit.  Results for orders placed or performed in visit on 02/21/18   SEDIMENTATION RATE, MANUAL   Result Value Ref Range    Sed Rate 61 (H) 0 - 20 mm/Hr     Results for orders placed or performed in visit on 02/21/18   C-REACTIVE PROTEIN   Result Value Ref Range    CRP 4.1 0.0 - 8.2 mg/L       Results for orders placed or performed in visit on 11/10/09   Sjogrens syndrome-A extractable nuclear antibody   Result Value Ref Range    Anti-SSA Antibody 181.22 (A) EU    Anti-SSA Interpretation Positive (A)      Results for orders placed or performed in visit on 11/10/09   Sjogrens syndrome-B extractable nuclear antibody   Result Value Ref  Range    Anti-SSB Antibody 16.46 EU    Anti-SSB Interpretation Negative      Results for orders placed or performed in visit on 11/10/09   Anti-DNA antibody, double-stranded   Result Value Ref Range    ds DNA Ab Negative Negative Titer     Results for orders placed or performed in visit on 11/11/13   Urinalysis   Result Value Ref Range    Specimen UA Urine, Unspecified     Color, UA Straw Yellow, Straw, Omaira    Appearance, UA Clear Clear    pH, UA 7.0 5.0 - 8.0    Specific Gravity, UA 1.010 1.005 - 1.030    Protein, UA Negative Negative    Glucose, UA Negative Negative    Ketones, UA Negative Negative    Bilirubin (UA) Negative Negative    Occult Blood UA Negative Negative    Nitrite, UA Negative Negative    Urobilinogen, UA Negative <2.0 EU/dL    Leukocytes, UA Negative Negative     Results for orders placed or performed in visit on 11/15/10   Protein / creatinine ratio, urine   Result Value Ref Range    Protein, Urine Random 25 mg/dl    Creatinine, Random Ur 281 15 - 325 mg/dl    Prot/Creat Ratio, Ur 0.09 0.05 - 1.07       Imaging reviewed.      Assessment:       1. Skin ulcers of both feet    2. Vitamin D deficiency    3. Disorder of bone              Scleroderma (GERD, +telangiectasias, ILD, exercise induced pHTN (resolved 6/2015 RHC), Raynaud's, sclerodactyly with claw hand) Skin score 20 . Skin, pulmonary all stable today. Foot ulcers persistent.  She follows closely with wound care.  Scleroderma stable.   She is unable to have EVLT due to persistent wounds    Exercise induced Pulmonary hypertension RHC 1/13- resolved on RHC 6/2015. Doesn't qualify for Bosentan. She is on Adcirca 40mg daily.       ILD (interstitial lung disease) - PFTS stable with moderate restriction     GERD (gastroesophageal reflux disease) - on PPI once daily with persistent symptoms    Vitamin D insufficiency- on vitamin D weekly 50,000    Claw hand, unspecified laterality    .  Osteopenia- due for repeat dexa   ROXANNE- resolved, on iron  supplements, s/p 2 units pRBCs  Baseline EKG done.     Plan:   - Continue with adcira 40 mg daily  - Plan to do DEXA prior to next visit.   - Continue GI f/u for dysphagia evaluation, planning for EGD, etc   - Will follow PFTs every 6 months.    - continue wound care .   - repeat Echo per Dr. Lim this summer, last was normal.   - referral to pain management for pain associated with wound ulcers.   - increase gabapentin 300 mg to 2 pills in the morning, 2 in the afternoon, and 3 in the evening..     RTC in 3 months.

## 2018-04-04 NOTE — PATIENT INSTRUCTIONS
Take your reflux medication as directed by GI ( Prilosec in the morning and Zantac at night)  Take gabapentin 2 in the morning, 2 in the afternoon, and 3 in the evening.   Decrease Iron supplement to twice daily.

## 2018-04-06 ENCOUNTER — TELEPHONE (OUTPATIENT)
Dept: WOUND CARE | Facility: CLINIC | Age: 68
End: 2018-04-06

## 2018-04-10 ENCOUNTER — OFFICE VISIT (OUTPATIENT)
Dept: WOUND CARE | Facility: CLINIC | Age: 68
End: 2018-04-10
Payer: MEDICARE

## 2018-04-10 VITALS
HEART RATE: 84 BPM | BODY MASS INDEX: 25.11 KG/M2 | WEIGHT: 147.06 LBS | TEMPERATURE: 97 F | SYSTOLIC BLOOD PRESSURE: 119 MMHG | HEIGHT: 64 IN | DIASTOLIC BLOOD PRESSURE: 62 MMHG

## 2018-04-10 DIAGNOSIS — L97.519 SKIN ULCERS OF BOTH FEET: Primary | ICD-10-CM

## 2018-04-10 DIAGNOSIS — M34.89 CUTANEOUS SCLERODERMA: ICD-10-CM

## 2018-04-10 DIAGNOSIS — L97.529 SKIN ULCERS OF BOTH FEET: Primary | ICD-10-CM

## 2018-04-10 PROCEDURE — 99212 OFFICE O/P EST SF 10 MIN: CPT | Mod: S$PBB,,, | Performed by: NURSE PRACTITIONER

## 2018-04-10 PROCEDURE — 99999 PR PBB SHADOW E&M-EST. PATIENT-LVL V: CPT | Mod: PBBFAC,,, | Performed by: NURSE PRACTITIONER

## 2018-04-10 PROCEDURE — 99215 OFFICE O/P EST HI 40 MIN: CPT | Mod: PBBFAC | Performed by: NURSE PRACTITIONER

## 2018-04-10 NOTE — PATIENT INSTRUCTIONS
Skin protectant to periwound area.  Medihoney gel to bilateral foot full-thickness ulcers and cover with hydrofiber.  Hydrofiber and gauze in between toes.  Pad both feet with ABD pads for cushioning.  Cover with gauze and secure with roll gauze.

## 2018-04-10 NOTE — PROGRESS NOTES
Subjective:       Patient ID: Yamel Shah is a 67 y.o. female.    Chief Complaint: Wound Check    Wound Check     This patient is seen today for reevaluation of ulcers to both feet.  The ulcers on the left foot started in January 2015 and the one on the right foot began the end of March 2015.  She is using medihoney gel gel daily on the wounds and covering with a hydrofiber rope dressing.  The wounds are healing slowly as evidenced by increased granulation and wound contracture.  All of her wounds are full-thickness.  In the past she has had pulsed Veletri infusions which have helped temporarily.  She is afebrile.  She denies increased swelling, redness or purulent drainage.  Her wound healing is complicated by scleroderma and venous insufficiency.  Her pain level is 8/10.  Review of Systems   Constitutional: Negative for chills, diaphoresis and fever.   HENT: Positive for tinnitus (left hear). Negative for hearing loss, postnasal drip, rhinorrhea, sinus pressure, sneezing, sore throat and trouble swallowing.    Eyes: Negative for visual disturbance.   Respiratory: Negative for cough, shortness of breath and wheezing.    Cardiovascular: Negative for chest pain, palpitations and leg swelling.   Gastrointestinal: Negative for constipation, diarrhea, nausea and vomiting.   Genitourinary: Negative for difficulty urinating, dysuria and hematuria.        Urinary incontinence   Musculoskeletal: Negative for arthralgias, back pain and joint swelling.   Skin: Positive for wound.   Neurological: Negative for dizziness, light-headedness and headaches.   Hematological: Bruises/bleeds easily.   Psychiatric/Behavioral: Positive for sleep disturbance. Negative for decreased concentration. The patient is not nervous/anxious.        Objective:      Physical Exam   Constitutional: She is oriented to person, place, and time. She appears well-developed and well-nourished. No distress.   HENT:   Head: Normocephalic and  atraumatic.   Neck: Normal range of motion.   Cardiovascular: Intact distal pulses.    Pulmonary/Chest: Effort normal. No respiratory distress.   Musculoskeletal: Normal range of motion. She exhibits no edema or tenderness.        Feet:    Neurological: She is alert and oriented to person, place, and time.   Skin: Skin is warm and dry. No rash noted. She is not diaphoretic. No cyanosis or erythema. Nails show no clubbing.   Psychiatric: She has a normal mood and affect. Her behavior is normal. Judgment and thought content normal.   Nursing note and vitals reviewed.      Assessment:       1. Skin ulcers of both feet    2. Cutaneous scleroderma        Plan:           Skin protectant to periwound area.  Medihoney gel to bilateral foot full-thickness ulcers and cover with hydrofiber.  Hydrofiber and gauze in between toes.  Pad both feet with ABD pads for cushioning.  Cover with gauze and secure with roll gauze.  Return to clinic in 4 weeks.    Right dorsal foot      Left dorsal/lateral foot      Right lateral ankle      Right medial ankle

## 2018-04-18 ENCOUNTER — HOSPITAL ENCOUNTER (OUTPATIENT)
Dept: RADIOLOGY | Facility: OTHER | Age: 68
Discharge: HOME | End: 2018-04-18
Attending: ANESTHESIOLOGY
Payer: MEDICARE

## 2018-04-18 ENCOUNTER — OFFICE VISIT (OUTPATIENT)
Dept: SPINE | Facility: CLINIC | Age: 68
End: 2018-04-18
Attending: ANESTHESIOLOGY
Payer: MEDICARE

## 2018-04-18 VITALS
WEIGHT: 147 LBS | BODY MASS INDEX: 23.63 KG/M2 | HEART RATE: 72 BPM | DIASTOLIC BLOOD PRESSURE: 56 MMHG | HEIGHT: 66 IN | TEMPERATURE: 96 F | SYSTOLIC BLOOD PRESSURE: 115 MMHG

## 2018-04-18 DIAGNOSIS — G60.9 IDIOPATHIC NEUROPATHY: ICD-10-CM

## 2018-04-18 DIAGNOSIS — L97.529 SKIN ULCERS OF BOTH FEET: ICD-10-CM

## 2018-04-18 DIAGNOSIS — M54.16 LUMBAR RADICULOPATHY: ICD-10-CM

## 2018-04-18 DIAGNOSIS — M54.16 LUMBAR RADICULOPATHY: Primary | ICD-10-CM

## 2018-04-18 DIAGNOSIS — L97.519 SKIN ULCERS OF BOTH FEET: ICD-10-CM

## 2018-04-18 PROCEDURE — 99999 PR PBB SHADOW E&M-EST. PATIENT-LVL IV: CPT | Mod: PBBFAC,,, | Performed by: ANESTHESIOLOGY

## 2018-04-18 PROCEDURE — 72114 X-RAY EXAM L-S SPINE BENDING: CPT | Mod: TC,FY

## 2018-04-18 PROCEDURE — 72114 X-RAY EXAM L-S SPINE BENDING: CPT | Mod: 26,,, | Performed by: INTERNAL MEDICINE

## 2018-04-18 PROCEDURE — 99214 OFFICE O/P EST MOD 30 MIN: CPT | Mod: PBBFAC,25 | Performed by: ANESTHESIOLOGY

## 2018-04-18 PROCEDURE — 99204 OFFICE O/P NEW MOD 45 MIN: CPT | Mod: S$PBB,,, | Performed by: ANESTHESIOLOGY

## 2018-04-18 RX ORDER — GABAPENTIN 300 MG/1
CAPSULE ORAL
Qty: 270 CAPSULE | Refills: 2 | Status: SHIPPED | OUTPATIENT
Start: 2018-04-18 | End: 2018-07-13 | Stop reason: SDUPTHER

## 2018-04-18 NOTE — PROGRESS NOTES
Chronic Pain - New Consult    Referring Physician: Nahum Ayoub MD    Chief Complaint:   Chief Complaint   Patient presents with    Foot Pain     Both         SUBJECTIVE: Disclaimer: This note has been generated using voice-recognition software. There may be typographical errors that have been missed during proof-reading    Initial encounter:    Yamel Shah presents to the clinic for the evaluation of foot pain. The pain started 2 years ago following ulcers and symptoms have been worsening.    Brief history: History of scleroderma    Pain Description:    The pain is located in the both feet in the area of slowly healing chronic foot ulcers which were partly from venous insufficiency and stasis associated with her scleroderma.  In her right lower extremity she describes radicular symptoms in the L4/5 distribution.    At BEST  5/10     At WORST  10/10 on the WORST day.      On average pain is rated as 8/10.     Today the pain is rated as 8/10    The pain is described as burning, sharp, shooting and constant      Symptoms interfere with daily activity, sleeping and work.     Exacerbating factors: Laying, Walking, Night Time, Morning and Getting out of bed/chair.      Mitigating factors medications.     Patient denies night fever/night sweats, urinary incontinence, bowel incontinence, significant weight loss, significant motor weakness and loss of sensations.  Patient denies any suicidal or homicidal ideations    Pain Medications:  Current:  Aleve  Gabapentin 2100    Tried in Past:  NSAIDs -Never  TCA -Never  SNRI -Never  Anti-convulsants -Never  Muscle Relaxants -Never  Opioids-Never    Physical Therapy/Home Exercise: no       report:  Reviewed and consistent with medication use as prescribed.    Pain Procedures:     Chiropractor -never  Acupuncture - never  TENS unit -never  Spinal decompression -never  Joint replacement -never    Imaging:   X-ray lumbar spine 6/1/2016:  2 views: Alignment is  normal. There is mild DJD. No fracture dislocation bone destruction seen.   Impression      Mild DJD.           Past Medical History:   Diagnosis Date    Abnormal Pap smear     Acid reflux     Allergy     Arthritis     GERD (gastroesophageal reflux disease)     History of migraine headaches     Hypertension     Idiopathic neuropathy 7/20/2012    ILD (interstitial lung disease) 11/6/2013    Iron deficiency anemia 3/18/2014    MRSA carrier     Osteopenia     Pulmonary fibrosis     Pulmonary hypertension     Raynaud's disease     Scleroderma, diffuse     Sjogren's syndrome     Vitamin D deficiency 11/14/2013     Past Surgical History:   Procedure Laterality Date    BREAST BIOPSY      Left, benign    CERVICAL CONIZATION   W/ LASER  1970    DILATION AND CURETTAGE OF UTERUS      HYSTERECTOMY  1990    FRANDY (AUB, Fibroids), ovaries remain     Social History     Social History    Marital status:      Spouse name: Lewis    Number of children: 4    Years of education: N/A     Occupational History    -retired [employer]     Retired     Social History Main Topics    Smoking status: Never Smoker    Smokeless tobacco: Never Used    Alcohol use 0.0 oz/week      Comment: ocassional glass of wine    Drug use: No    Sexual activity: Yes     Partners: Male     Other Topics Concern    Are You Pregnant Or Think You May Be? No    Breast-Feeding No     Social History Narrative         Family History   Problem Relation Age of Onset    Breast cancer Mother     Stroke Father     Breast cancer Sister     Breast cancer Maternal Aunt     Melanoma Neg Hx     Colon cancer Neg Hx     Crohn's disease Neg Hx     Stomach cancer Neg Hx     Ulcerative colitis Neg Hx     Rectal cancer Neg Hx     Irritable bowel syndrome Neg Hx     Esophageal cancer Neg Hx     Celiac disease Neg Hx        Review of patient's allergies indicates:   Allergen Reactions    Sulfa  (sulfonamide antibiotics)      Other reaction(s): Rash       Current Outpatient Prescriptions   Medication Sig    ADCIRCA 20 mg Tab TAKE TWO  TABLETS (40MG) BY MOUTH ONCE DAILY. CALL [phone] TO REFILL.    ergocalciferol (ERGOCALCIFEROL) 50,000 unit Cap Take 1 capsule (50,000 Units total) by mouth every 7 days.    ferrous sulfate 325 (65 FE) MG EC tablet Take 1 tablet (325 mg total) by mouth 3 (three) times daily.    gabapentin (NEURONTIN) 300 MG capsule Take 2 in the morning, 2 pills in the afternoon, and 3 pills at night.    multivitamin capsule Take 1 capsule by mouth once daily.     NIFEdipine (ADALAT CC) 90 MG TbSR TAKE 1 TABLET(90 MG) BY MOUTH EVERY DAY    omeprazole (PRILOSEC) 40 MG capsule Take 1 capsule (40 mg total) by mouth before breakfast.    pravastatin (PRAVACHOL) 20 MG tablet Take 1 tablet (20 mg total) by mouth every evening.    PREVIDENT 5000 BOOSTER PLUS 1.1 % Pste     ranitidine (ZANTAC) 150 MG capsule Take 1 capsule (150 mg total) by mouth 2 (two) times daily.    albuterol 90 mcg/actuation inhaler Inhale 2 puffs into the lungs every 4 (four) hours as needed for Wheezing.    desonide (DESOWEN) 0.05 % cream Apply topically 2 (two) times daily.    gabapentin (NEURONTIN) 300 MG capsule Then increase to 4 tablets at night, 2 in AM and 2 in afternoon for 7 days.  Then increase to 4 tablets at night and 2 in morning, 3 tablet in afternoon    PATADAY 0.2 % Drop 1 drop once daily.      No current facility-administered medications for this visit.        REVIEW OF SYSTEMS:    GENERAL:  No weight loss, malaise or fevers.  HEENT:   No recent changes in vision or hearing  NECK:  Negative for lumps,  difficulty with swallowing associated with scleroderma.  RESPIRATORY:  Negative for cough, wheezing or shortness of breath, patient denies any recent URI. Albuterol (history of ILD)  CARDIOVASCULAR:  Negative for chest pain, leg swelling or palpitations.  GI:  Negative for abdominal discomfort,  "blood in stools or black stools or change in bowel habits. GERD controlled zantac  MUSCULOSKELETAL:  See HPI.  SKIN:   Chronic low healing venous stasis ulcerations bilateral lower extremities   PSYCH:  No mood disorder or recent psychosocial stressors.  Patients sleep is not disturbed secondary to pain.  HEMATOLOGY/LYMPHOLOGY:   History of iron deficiency anemia states that she received several units of blood several months ago but has been having improved feeling in her feet since transfusion, takes daily iron supplementation.    ENDO: No history of diabetes or thyroid dysfunction  NEURO:   No history of headaches, syncope, paralysis, seizures or tremors.  All other reviewed and negative other than HPI.    OBJECTIVE:    BP (!) 115/56   Pulse 72   Temp 96.3 °F (35.7 °C)   Ht 5' 6" (1.676 m)   Wt 66.7 kg (147 lb)   BMI 23.73 kg/m²     PHYSICAL EXAMINATION:    GENERAL: Well appearing, in no acute distress, alert and oriented x3.  PSYCH:  Mood and affect appropriate.  SKIN: Tight skin associated with scleroderma. Pictures of foot ulcers seen in wound consult note, dressings not removed in clinic today  HEAD/FACE:  Normocephalic, atraumatic. Cranial nerves grossly intact.  CV: RRR with palpation of the radial artery.  PULM: No evidence of respiratory difficulty, symmetric chest rise.  BACK:  There is pain with palpation over the facet joints of the lumbar spine bilaterally. There is decreased range of motion with extension to 15 degrees, and facet loading maneuvers cause reproducible pain.    EXTREMITIES: contractures over on bilateral hands with ulcerations.  MUSCULOSKELETAL:  There is no pain to palpation over the greater trochanteric bursa bilaterally.  FABERs test is positive.  FADIRs test is negative.   Bilateral lower extremity strength testing limited secondary to pain in the feet.    NEURO: Bilateral lower extremity coordination and muscle stretch reflexes are physiologic and symmetric.  Plantar response " are downgoing. No clonus.  No loss of sensation is noted.  GAIT: Antalgic, ambulates without assistance         Lab Results   Component Value Date    WBC 4.41 03/24/2018    HGB 12.1 03/24/2018    HCT 35.7 (L) 03/24/2018    MCV 85 03/24/2018     03/24/2018     BMP  Lab Results   Component Value Date     03/24/2018    K 4.1 03/24/2018     03/24/2018    CO2 25 03/24/2018    BUN 15 03/24/2018    CREATININE 0.7 03/24/2018    CALCIUM 9.1 03/24/2018    ANIONGAP 8 03/24/2018    ESTGFRAFRICA >60.0 03/24/2018    EGFRNONAA >60.0 03/24/2018         ASSESSMENT: 67 y.o. year old female with pain, consistent with     Encounter Diagnoses   Name Primary?    Lumbar radiculopathy Yes    Skin ulcers of both feet     Idiopathic neuropathy        PLAN:   Continue escalation of gabapentin as the patient is tolerating side effects at this time.  A dose escalation schedule was provided the patient, with dose escalation results in side effects such as significant sedation we can trial Lyrica as an alternative but we do note that anticonvulsants are helping with her pain but not completely.    Topical compounded pain cream to apply over painful areas without applying to any open or broken skin    Trial tramadol 50 mg tablets every 8 hours by mouth when necessary quantity #10, refill 0 if this is helpful without significant side effects we can potentially continue this medication for intermittent pain exacerbations.      Flexion extension x-rays lumbar spine to rule out any change compared to x-rays from 2016 that would result in neuroforaminal stenosis potentially adding to radiculopathy, if there is any evidence may benefit from MRI of the lumbar spine in the future and epidural steroid injections for diagnostic/therapeutic purposes.    Follow-up in 2 weeks to review imaging and response to gabapentin escalation or need for further workup and medication change.      Greater than 25 minutes spent in total in todays visit  with the patient, with more than half that time direct face to face counseling and education with the patient today. We discussed the disease process, prognosis, treatment plan, and risks and benefits.     Adalgisa King  04/18/2018

## 2018-04-18 NOTE — LETTER
April 18, 2018      Nahum Ayoub MD  [address]  [facility] [address]           [facility]  [address], [address]  [facility] [address]  Phone: [phone]  Fax: [phone]          Patient: Yamel Shah   MR Number: [id number]   YOB: 1950   Date of Visit: 4/18/2018       Dear Dr. Nahum Ayoub:    Thank you for referring Yamel Shah to me for evaluation. Attached you will find relevant portions of my assessment and plan of care.    If you have questions, please do not hesitate to call me. I look forward to following Yamel Shah along with you.    Sincerely,    Adalgisa King MD    Enclosure  CC:  No Recipients    If you would like to receive this communication electronically, please contact [email or web address] or [phone] to request more information on "[employer] Link access.    For providers and/or their staff who would like to refer a patient to [facility], please contact us through our one-stop-shop provider referral line, [facility], at [phone].    If you feel you have received this communication in error or would no longer like to receive these types of communications, please e-mail [email or web address]

## 2018-04-25 ENCOUNTER — TELEPHONE (OUTPATIENT)
Dept: PAIN MEDICINE | Facility: CLINIC | Age: 68
End: 2018-04-25

## 2018-04-25 NOTE — TELEPHONE ENCOUNTER
Request from [facility] for demographics on patient in order to fill. Demographics faxed to [facility].

## 2018-05-15 ENCOUNTER — TELEPHONE (OUTPATIENT)
Dept: WOUND CARE | Facility: CLINIC | Age: 68
End: 2018-05-15

## 2018-05-16 ENCOUNTER — OFFICE VISIT (OUTPATIENT)
Dept: SPINE | Facility: CLINIC | Age: 68
End: 2018-05-16
Attending: ANESTHESIOLOGY
Payer: MEDICARE

## 2018-05-16 VITALS
TEMPERATURE: 96 F | BODY MASS INDEX: 24.49 KG/M2 | HEIGHT: 65 IN | SYSTOLIC BLOOD PRESSURE: 110 MMHG | DIASTOLIC BLOOD PRESSURE: 52 MMHG | HEART RATE: 83 BPM | WEIGHT: 147 LBS

## 2018-05-16 DIAGNOSIS — G60.9 IDIOPATHIC NEUROPATHY: ICD-10-CM

## 2018-05-16 DIAGNOSIS — M34.89 CUTANEOUS SCLERODERMA: Primary | ICD-10-CM

## 2018-05-16 DIAGNOSIS — L97.529 SKIN ULCERS OF BOTH FEET: ICD-10-CM

## 2018-05-16 DIAGNOSIS — L97.519 SKIN ULCERS OF BOTH FEET: ICD-10-CM

## 2018-05-16 PROCEDURE — 99999 PR PBB SHADOW E&M-EST. PATIENT-LVL IV: CPT | Mod: PBBFAC,,, | Performed by: ANESTHESIOLOGY

## 2018-05-16 PROCEDURE — 99214 OFFICE O/P EST MOD 30 MIN: CPT | Mod: S$PBB,,, | Performed by: ANESTHESIOLOGY

## 2018-05-16 PROCEDURE — 99214 OFFICE O/P EST MOD 30 MIN: CPT | Mod: PBBFAC | Performed by: ANESTHESIOLOGY

## 2018-05-16 RX ORDER — TRAMADOL HYDROCHLORIDE 50 MG/1
50 TABLET ORAL EVERY 6 HOURS PRN
Qty: 10 TABLET | Refills: 0 | Status: SHIPPED | OUTPATIENT
Start: 2018-05-16 | End: 2018-05-26

## 2018-05-16 RX ORDER — LIDOCAINE AND PRILOCAINE 25; 25 MG/G; MG/G
CREAM TOPICAL
COMMUNITY
Start: 2018-04-26 | End: 2019-03-12

## 2018-05-16 NOTE — PROGRESS NOTES
Chronic Pain - Follow Up    Referring Physician: No ref. provider found    Chief Complaint:   Chief Complaint   Patient presents with    Foot Pain     Both        SUBJECTIVE: Disclaimer: This note has been generated using voice-recognition software. There may be typographical errors that have been missed during proof-reading  Interval history 05/16/2018:      The patient has had x-rays of the lumbar spine which did not reveal any evidence of significant neuroforaminal stenosis with degenerative disc disease.  There is mild facet arthropathy.  She has escalated her gabapentin and is currently taking 2100 mg of gabapentin per day without any noticeable improvement but daytime somnolence.  She continues to have nonhealing ulcers and topical pain cream is helping to a limited degree over her leg in areas where there is no skin disruption.    Initial encounter:    Yamel Shah presents to the clinic for the evaluation of foot pain. The pain started 2 years ago following ulcers and symptoms have been worsening.    Brief history: History of scleroderma    Pain Description:    The pain is located in the both feet in the area of slowly healing chronic foot ulcers which were partly from venous insufficiency and stasis associated with her scleroderma.  In her right lower extremity she describes radicular symptoms in the L4/5 distribution.    At BEST  5/10     At WORST  10/10 on the WORST day.      On average pain is rated as 8/10.     Today the pain is rated as 8/10    The pain is described as burning, sharp, shooting and constant      Symptoms interfere with daily activity, sleeping and work.     Exacerbating factors: Laying, Walking, Night Time, Morning and Getting out of bed/chair.      Mitigating factors medications.     Patient denies night fever/night sweats, urinary incontinence, bowel incontinence, significant weight loss, significant motor weakness and loss of sensations.  Patient denies any suicidal or  homicidal ideations    Pain Medications:  Current:  Aleve  Gabapentin 2100    Tried in Past:  NSAIDs -Never  TCA -Never  SNRI -Never  Anti-convulsants -Never  Muscle Relaxants -Never  Opioids-Never    Physical Therapy/Home Exercise: no       report:  Reviewed and consistent with medication use as prescribed.    Pain Procedures:     Chiropractor -never  Acupuncture - never  TENS unit -never  Spinal decompression -never  Joint replacement -never    Imaging:   X-ray lumbar spine 6/1/2016:  2 views: Alignment is normal. There is mild DJD. No fracture dislocation bone destruction seen.   Impression      Mild DJD.     Xray lumbar spine 4/2018  EXAMINATION:  XR LUMBAR SPINE 5 VIEW WITH FLEX AND EXT    CLINICAL HISTORY:  evaluate facet arthropathy and r/o instability and neuroforaminal stenosis;  Radiculopathy, lumbar region    TECHNIQUE:  Five views of the lumbar spine plus flexion extension views were performed.    COMPARISON:  June 2016    FINDINGS:  There is slight curvature of the lumbar spine.  The vertebral bodies are normally aligned and normal in height.  Mild disc space narrowing present at L5-S1.  There is mild facet degenerative change in the lower spine.  No significant osteophytic spurring present.  There is no change in alignment with flexion or extension.      Past Medical History:   Diagnosis Date    Abnormal Pap smear     Acid reflux     Allergy     Arthritis     GERD (gastroesophageal reflux disease)     History of migraine headaches     Hypertension     Idiopathic neuropathy 7/20/2012    ILD (interstitial lung disease) 11/6/2013    Iron deficiency anemia 3/18/2014    MRSA carrier     Osteopenia     Pulmonary fibrosis     Pulmonary hypertension     Raynaud's disease     Scleroderma, diffuse     Sjogren's syndrome     Vitamin D deficiency 11/14/2013     Past Surgical History:   Procedure Laterality Date    BREAST BIOPSY      Left, benign    CERVICAL CONIZATION   W/ LASER  1970     DILATION AND CURETTAGE OF UTERUS      HYSTERECTOMY  1990    FRANDY (AUB, Fibroids), ovaries remain     Social History     Social History    Marital status:      Spouse name: Lewis    Number of children: 4    Years of education: N/A     Occupational History    -retired [employer]     Retired     Social History Main Topics    Smoking status: Never Smoker    Smokeless tobacco: Never Used    Alcohol use 0.0 oz/week      Comment: ocassional glass of wine    Drug use: No    Sexual activity: Yes     Partners: Male     Other Topics Concern    Are You Pregnant Or Think You May Be? No    Breast-Feeding No     Social History Narrative         Family History   Problem Relation Age of Onset    Breast cancer Mother     Stroke Father     Breast cancer Sister     Breast cancer Maternal Aunt     Melanoma Neg Hx     Colon cancer Neg Hx     Crohn's disease Neg Hx     Stomach cancer Neg Hx     Ulcerative colitis Neg Hx     Rectal cancer Neg Hx     Irritable bowel syndrome Neg Hx     Esophageal cancer Neg Hx     Celiac disease Neg Hx        Review of patient's allergies indicates:   Allergen Reactions    Sulfa (sulfonamide antibiotics)      Other reaction(s): Rash       Current Outpatient Prescriptions   Medication Sig    ADCIRCA 20 mg Tab TAKE TWO  TABLETS (40MG) BY MOUTH ONCE DAILY. CALL [phone] TO REFILL.    albuterol 90 mcg/actuation inhaler Inhale 2 puffs into the lungs every 4 (four) hours as needed for Wheezing.    ergocalciferol (ERGOCALCIFEROL) 50,000 unit Cap Take 1 capsule (50,000 Units total) by mouth every 7 days.    ferrous sulfate 325 (65 FE) MG EC tablet Take 1 tablet (325 mg total) by mouth 3 (three) times daily.    gabapentin (NEURONTIN) 300 MG capsule Take 2 in the morning, 2 pills in the afternoon, and 3 pills at night.    gabapentin (NEURONTIN) 300 MG capsule Then increase to 4 tablets at night, 2 in AM and 2 in afternoon for 7  days.  Then increase to 4 tablets at night and 2 in morning, 3 tablet in afternoon    lidocaine-prilocaine (EMLA) cream     multivitamin capsule Take 1 capsule by mouth once daily.     NIFEdipine (ADALAT CC) 90 MG TbSR TAKE 1 TABLET(90 MG) BY MOUTH EVERY DAY    omeprazole (PRILOSEC) 40 MG capsule Take 1 capsule (40 mg total) by mouth before breakfast.    PATADAY 0.2 % Drop 1 drop once daily.     pravastatin (PRAVACHOL) 20 MG tablet Take 1 tablet (20 mg total) by mouth every evening.    ranitidine (ZANTAC) 150 MG capsule Take 1 capsule (150 mg total) by mouth 2 (two) times daily.    desonide (DESOWEN) 0.05 % cream Apply topically 2 (two) times daily.    PREVIDENT 5000 BOOSTER PLUS 1.1 % Pste      No current facility-administered medications for this visit.        REVIEW OF SYSTEMS:    GENERAL:  No weight loss, malaise or fevers.  HEENT:   No recent changes in vision or hearing  NECK:  Negative for lumps,  difficulty with swallowing associated with scleroderma.  RESPIRATORY:  Negative for cough, wheezing or shortness of breath, patient denies any recent URI. Albuterol (history of ILD)  CARDIOVASCULAR:  Negative for chest pain, leg swelling or palpitations.  GI:  Negative for abdominal discomfort, blood in stools or black stools or change in bowel habits. GERD controlled zantac  MUSCULOSKELETAL:  See HPI.  SKIN:   Chronic low healing venous stasis ulcerations bilateral lower extremities   PSYCH:  No mood disorder or recent psychosocial stressors.  Patients sleep is not disturbed secondary to pain.  HEMATOLOGY/LYMPHOLOGY:   History of iron deficiency anemia states that she received several units of blood several months ago but has been having improved feeling in her feet since transfusion, takes daily iron supplementation.    ENDO: No history of diabetes or thyroid dysfunction  NEURO:   No history of headaches, syncope, paralysis, seizures or tremors.  All other reviewed and negative other than  "HPI.    OBJECTIVE:    BP (!) 110/52   Pulse 83   Temp 96.2 °F (35.7 °C)   Ht 5' 5" (1.651 m)   Wt 66.7 kg (147 lb)   BMI 24.46 kg/m²     PHYSICAL EXAMINATION:    GENERAL: Well appearing, in no acute distress, alert and oriented x3.  PSYCH:  Mood and affect appropriate.  SKIN: Tight skin associated with scleroderma. Pictures of foot ulcers seen in wound consult note, dressings not removed in clinic today  HEAD/FACE:  Normocephalic, atraumatic. Cranial nerves grossly intact.  CV: RRR with palpation of the radial artery.  PULM: No evidence of respiratory difficulty, symmetric chest rise.  BACK:  There is pain with palpation over the facet joints of the lumbar spine bilaterally. There is decreased range of motion with extension to 15 degrees, and facet loading maneuvers cause reproducible pain.    EXTREMITIES: contractures over on bilateral hands with ulcerations.  MUSCULOSKELETAL:  There is no pain to palpation over the greater trochanteric bursa bilaterally.  FABERs test is positive.  FADIRs test is negative.   Bilateral lower extremity strength testing limited secondary to pain in the feet.    NEURO: Bilateral lower extremity coordination and muscle stretch reflexes are physiologic and symmetric.  Plantar response are downgoing. No clonus.  No loss of sensation is noted.  GAIT: Antalgic, ambulates without assistance         Lab Results   Component Value Date    WBC 4.41 03/24/2018    HGB 12.1 03/24/2018    HCT 35.7 (L) 03/24/2018    MCV 85 03/24/2018     03/24/2018     BMP  Lab Results   Component Value Date     03/24/2018    K 4.1 03/24/2018     03/24/2018    CO2 25 03/24/2018    BUN 15 03/24/2018    CREATININE 0.7 03/24/2018    CALCIUM 9.1 03/24/2018    ANIONGAP 8 03/24/2018    ESTGFRAFRICA >60.0 03/24/2018    EGFRNONAA >60.0 03/24/2018         ASSESSMENT: 67 y.o. year old female with pain, consistent with     Encounter Diagnoses   Name Primary?    Cutaneous scleroderma Yes    Skin ulcers " of both feet     Idiopathic neuropathy        PLAN:   It is uncertain if gabapentin is helping for her lower extremity pain is radicular versus vascular at this time. Based on x-ray imaging does not appear to be any significant neuroforaminal stenosis.  We will deescalate Gabapentin by removing 1 tablet per day every 3 days, if pain starts to worsen I instructed patient to resume previous effective dose and call clinic with update.   If she completed discontinues Gabapentin, we can consider trialing Lyrica versus further workup for vascular deficiency.    Topical compounded pain cream to apply over painful areas without applying to any open or broken skin    Trial tramadol 50 mg tablets every 8 hours by mouth when necessary quantity #10, refill 0 if this is helpful without significant side effects we can potentially continue this medication for intermittent pain exacerbations.      F/u in 1 month      Adalgisa King  05/16/2018

## 2018-05-25 RX ORDER — ERGOCALCIFEROL 1.25 MG/1
50000 CAPSULE ORAL
Qty: 12 CAPSULE | Refills: 0 | Status: SHIPPED | OUTPATIENT
Start: 2018-05-25 | End: 2018-09-04 | Stop reason: SDUPTHER

## 2018-06-01 ENCOUNTER — HOSPITAL ENCOUNTER (OUTPATIENT)
Dept: CARDIOLOGY | Facility: CLINIC | Age: 68
Discharge: HOME | End: 2018-06-01
Attending: INTERNAL MEDICINE
Payer: MEDICARE

## 2018-06-01 ENCOUNTER — OFFICE VISIT (OUTPATIENT)
Dept: TRANSPLANT | Facility: CLINIC | Age: 68
End: 2018-06-01
Payer: MEDICARE

## 2018-06-01 VITALS
BODY MASS INDEX: 24.55 KG/M2 | SYSTOLIC BLOOD PRESSURE: 130 MMHG | DIASTOLIC BLOOD PRESSURE: 56 MMHG | WEIGHT: 152.75 LBS | HEART RATE: 64 BPM | OXYGEN SATURATION: 98 % | HEIGHT: 66 IN

## 2018-06-01 DIAGNOSIS — I27.29 PULMONARY HYPERTENSION ASSOCIATED WITH SYSTEMIC DISORDER: Chronic | ICD-10-CM

## 2018-06-01 DIAGNOSIS — I78.1 TELANGIECTASIA: ICD-10-CM

## 2018-06-01 DIAGNOSIS — M34.89 CUTANEOUS SCLERODERMA: ICD-10-CM

## 2018-06-01 DIAGNOSIS — I27.29 PULMONARY HYPERTENSION ASSOCIATED WITH SYSTEMIC DISORDER: Primary | Chronic | ICD-10-CM

## 2018-06-01 DIAGNOSIS — J84.9 ILD (INTERSTITIAL LUNG DISEASE): ICD-10-CM

## 2018-06-01 DIAGNOSIS — D50.8 OTHER IRON DEFICIENCY ANEMIA: ICD-10-CM

## 2018-06-01 DIAGNOSIS — I51.7 CARDIOMEGALY: ICD-10-CM

## 2018-06-01 DIAGNOSIS — I36.9 NONRHEUMATIC TRICUSPID VALVE DISORDER: ICD-10-CM

## 2018-06-01 LAB
DIASTOLIC DYSFUNCTION: NO
ESTIMATED PA SYSTOLIC PRESSURE: 28.4
MITRAL VALVE REGURGITATION: NORMAL
RETIRED EF AND QEF - SEE NOTES: 65 (ref 55–65)
TRICUSPID VALVE REGURGITATION: NORMAL

## 2018-06-01 PROCEDURE — 93306 TTE W/DOPPLER COMPLETE: CPT | Mod: PBBFAC | Performed by: INTERNAL MEDICINE

## 2018-06-01 PROCEDURE — 99214 OFFICE O/P EST MOD 30 MIN: CPT | Mod: S$PBB,,, | Performed by: INTERNAL MEDICINE

## 2018-06-01 PROCEDURE — 99213 OFFICE O/P EST LOW 20 MIN: CPT | Mod: PBBFAC,25 | Performed by: INTERNAL MEDICINE

## 2018-06-01 PROCEDURE — 99999 PR PBB SHADOW E&M-EST. PATIENT-LVL III: CPT | Mod: PBBFAC,,, | Performed by: INTERNAL MEDICINE

## 2018-06-01 NOTE — PATIENT INSTRUCTIONS
1. Continue current therapy.  2.  Keep salt intake to under 2000 mg sodium, fluids to under 2 L (64 oz)  3  Check your weights every morning after getting out of bed and urinating. If your weight goes up 3# overnight or 5# in one week call us  4. Call us if you find yourself getting more short of breath, have more swelling or unexpected weight changes, fatigue or chest pain    Flu shot in the fall  You will be due to for the PREVNAR pneumonia shot because you last got it in 2013  You got pneumovax in 2016 (due for 2021)    Talk to your PCP about the shingles vaccine

## 2018-06-01 NOTE — PROGRESS NOTES
Subjective:    Patient ID:  Yamel Shah is a 67 y.o. female who presents for follow-up of Pulmonary Hypertension (5mo clinic visit w/ ECHO done today)      HPI   very pleasant 67 year old woman with scleroderma, diagnosed in 1983, previously followed by Dr Boudreaux for pulmonary hypertension here for PH f/u-  She was started on Revatio 2013, but had difficulty with affording it, so she was switched to Adcirca 40mg daily.     Since last visit pt cont to deal with the ulcers on her feet, because of this she cant walk like she used to. Has chronic swelling in her ankles, R>L, has right leg wrapped- thinks the three ulcers contribute to the swelling  Says her breathing has been better and no CP.  No palps, orthopnea, PND    No 6mw today due to the ulcers in her feet    TTE today  Right Ventricle: The right ventricle is normal in size measuring 3.8 cm at the base in the apical right ventricle-focused view. Global right ventricular systolic function appears normal. Tricuspid annular plane systolic excursion (TAPSE) is 2.5 cm.   Tissue Doppler-derived tricuspid annular peak systolic velocity (S prime) is 12.5 cm/s. The estimated PA systolic pressure is 28 mmHg.   left atrial volume index is mildly enlarged, measuring 38.00 cc/m2.     Left Ventricle: The left ventricle is upper limit of normal, with an end-diastolic diameter of 5.0 cm, and an end-systolic diameter of 2.9 cm. LV wall thickness is normal, with the septum measuring 0.8 cm and the posterior wall measuring 0.9 cm across.   Relative wall thickness was normal at 0.36, and the LV mass index was 97.2 g/m2 consistent with normal left ventricular mass. There are no regional wall motion abnormalities. Left ventricular systolic function appears normal. Visually estimated ejection   fraction is 60-65%. The LV Doppler derived stroke volume equals 63.0 ccs.    TTE 8/17    1 - Upper limit of normal left ventricular enlargement.     2 - Normal left ventricular  systolic function (EF 60-65%).     3 - No wall motion abnormalities.     4 - Normal left ventricular diastolic function.     5 - Mild left atrial enlargement.     6 - Normal right ventricular systolic function .     7 - Trivial to mild mitral regurgitation.     8 - The estimated PA systolic pressure is 28 mmHg.     TTE   1 - Normal left ventricular systolic function (EF 60-65%).     2 - Mild left atrial enlargement.     3 - Normal right ventricular systolic function .     4 - The estimated PA systolic pressure is 30 mmHg.     PFTs 9/16  Normal airflow, mild restriction, normal DLCO    CT chest 9/15  Stable-appearing chronic interstitial lung disease consistent with patient's history of scleroderma. No acute abnormality is identified.  Given the long-term stability of findings dating back to 2007, follow-up as clinically warranted.    Dependent secretions throughout the esophagus to suggest dysmotility  RHC 6/29/ 15    AOSAT: 97  FICKCI: 3.42  FICKCO: 6.06  PAPRES: 35/12 (21)  PASAT: 73  PVR: 1.98  PWPRES: 12/12 (9)  RAPRES: 11/9 (5)  RVPRES: 31/1, 6    TTE 6/15  1 - Normal left ventricular systolic function (EF 60-65%).   2 - Normal left ventricular diastolic function.   3 - Normal right ventricular systolic function .   4 - Trivial tricuspid regurgitation.      Review of Systems   Constitution: Negative for chills, fever, malaise/fatigue and weight gain.   HENT: Negative.    Eyes: Negative.    Cardiovascular: Negative for chest pain, dyspnea on exertion, leg swelling, near-syncope, orthopnea, palpitations, paroxysmal nocturnal dyspnea and syncope.   Respiratory: Negative for cough and shortness of breath.    Endocrine: Negative.    Skin: Negative.    Musculoskeletal: Negative.         Foot ulcer pain   Gastrointestinal: Negative for bloating, abdominal pain and change in bowel habit.   Neurological: Negative for dizziness and light-headedness.   Psychiatric/Behavioral: Negative for depression.        Objective:  "  BP (!) 130/56   Pulse 64   Ht 5' 6" (1.676 m)   Wt 69.3 kg (152 lb 12.5 oz)   SpO2 98%   BMI 24.66 kg/m²       Physical Exam   Constitutional: She is oriented to person, place, and time. She appears well-developed and well-nourished.   HENT:   Head: Normocephalic and atraumatic.   Eyes: Right eye exhibits no discharge. Left eye exhibits no discharge.   Neck: Neck supple. No JVD present. No thyromegaly present.   Cardiovascular: Normal rate and regular rhythm.  Exam reveals no gallop and no friction rub.    No murmur heard.       Pulmonary/Chest: Effort normal and breath sounds normal. No respiratory distress. She has no wheezes. She has no rales.   Abdominal: Soft. Bowel sounds are normal. She exhibits no distension. There is no tenderness.   Musculoskeletal: Normal range of motion. She exhibits no edema or tenderness.   Neurological: She is alert and oriented to person, place, and time. No cranial nerve deficit. Coordination normal.   Skin: Skin is warm and dry. No rash noted.   Severe tightening of skin hands   Psychiatric: She has a normal mood and affect. Judgment and thought content normal.         Lab Results   Component Value Date    BNP 75 06/01/2018     06/01/2018    K 4.1 06/01/2018    MG 2.1 10/09/2016     06/01/2018    CO2 25 06/01/2018    BUN 20 06/01/2018    CREATININE 0.7 06/01/2018    GLU 93 06/01/2018    AST 20 06/01/2018    ALT 10 06/01/2018    ALBUMIN 3.3 (L) 06/01/2018    PROT 8.2 06/01/2018    BILITOT 0.5 06/01/2018    CHOL 118 (L) 02/21/2018    HDL 54 02/21/2018    LDLCALC 52.4 (L) 02/21/2018    TRIG 58 02/21/2018       Magnesium   Date Value Ref Range Status   10/09/2016 2.1 1.6 - 2.6 mg/dL Final       Lab Results   Component Value Date    WBC 4.33 06/01/2018    HGB 12.4 06/01/2018    HCT 36.3 (L) 06/01/2018    MCV 96 06/01/2018     06/01/2018       Lab Results   Component Value Date    INR 1.0 06/29/2015    INR 1.0 01/21/2013    INR 1.0 09/29/2007       BNP   Date " Value Ref Range Status   06/01/2018 75 0 - 99 pg/mL Final     Comment:     Values of less than 100 pg/ml are consistent with non-CHF populations.   06/27/2017 48 0 - 99 pg/mL Final     Comment:     Values of less than 100 pg/ml are consistent with non-CHF populations.   07/22/2016 69 0 - 99 pg/mL Final     Comment:     Values of less than 100 pg/ml are consistent with non-CHF populations.       LD   Date Value Ref Range Status   02/22/2018 161 110 - 260 U/L Final     Comment:     Results are increased in hemolyzed samples.   09/30/2007 139 110 - 260 U/L Final           Assessment:       1. Pulmonary hypertension associated with systemic disorder- normal PAP on RHC  with normal RV function by echo, BNP normal, euvolemic on exam- no 6mw due to foot ulcers   2. Scleroderma    3. Telangiectasia    4. ILD (interstitial lung disease) - PFts also surprisingly good, chest imaging stable        Plan:    no PH medicine changes today-    Due for prevnar- asked her to talk to her PCP about this and shingles when she sees him next week    From a PH standpoint, based on her echo today and last RHC she appears to be doing very well- PFTs also surprisingly good, chest imaging stable    Keep salt intake to under 2000 mg sodium, fluids to under 2 L (64 oz)    Check weights every morning after getting out of bed and urinating. If weight goes up 3# overnight or 5# in one week she should call us    Will plan to see pt back in 4 mo, labs and walk if she is able to do it

## 2018-06-04 RX ORDER — TRAMADOL HYDROCHLORIDE 50 MG/1
50 TABLET ORAL EVERY 8 HOURS PRN
Qty: 90 TABLET | Refills: 0 | Status: SHIPPED | OUTPATIENT
Start: 2018-06-04 | End: 2018-07-24

## 2018-06-04 NOTE — TELEPHONE ENCOUNTER
----- Message from Maya Coyne sent at 6/4/2018  1:42 PM CDT -----      Can the clinic reply in [facility]: no      Please refill the medication(s) listed below. Please call the patient when the prescription(s) is ready for  at this phone number  [phone].       Medication #1 -  traMADol (ULTRAM) 50 mg tablet 10 tablet 0 5/16/2018 5/26/2018 --  Sig - Route: Take 1 tablet (50 mg total) by mouth every 6 (six) hours as needed for Pain. - Oral  Class: Normal  Order: [id number]        Medication #2 n/a      Preferred Pharmacy:[facility] [address] [place] [address] [address] AT [address] [phone] (Phone)  [phone] (Fax)

## 2018-06-04 NOTE — TELEPHONE ENCOUNTER
Patient requesting refill on tramadol.     Office visit note from 05/16/2018 states the following:   Trial tramadol 50 mg tablets every 8 hours by mouth when necessary quantity #10, refill 0 if this is helpful without significant side effects we can potentially continue this medication for intermittent pain exacerbations.

## 2018-06-05 ENCOUNTER — HOSPITAL ENCOUNTER (OUTPATIENT)
Dept: RADIOLOGY | Facility: HOSPITAL | Age: 68
Discharge: HOME | End: 2018-06-05
Attending: INTERNAL MEDICINE
Payer: MEDICARE

## 2018-06-05 ENCOUNTER — OFFICE VISIT (OUTPATIENT)
Dept: INTERNAL MEDICINE | Facility: CLINIC | Age: 68
End: 2018-06-05
Payer: MEDICARE

## 2018-06-05 VITALS
SYSTOLIC BLOOD PRESSURE: 112 MMHG | BODY MASS INDEX: 24.13 KG/M2 | HEART RATE: 71 BPM | HEIGHT: 66 IN | DIASTOLIC BLOOD PRESSURE: 54 MMHG | TEMPERATURE: 98 F | WEIGHT: 150.13 LBS

## 2018-06-05 DIAGNOSIS — S89.91XA INJURY OF RIGHT KNEE, INITIAL ENCOUNTER: Primary | ICD-10-CM

## 2018-06-05 DIAGNOSIS — K21.9 GASTROESOPHAGEAL REFLUX DISEASE, ESOPHAGITIS PRESENCE NOT SPECIFIED: ICD-10-CM

## 2018-06-05 DIAGNOSIS — M34.9 SCLERODERMA: ICD-10-CM

## 2018-06-05 DIAGNOSIS — D50.8 OTHER IRON DEFICIENCY ANEMIA: ICD-10-CM

## 2018-06-05 DIAGNOSIS — I27.29 PULMONARY HYPERTENSION ASSOCIATED WITH SYSTEMIC DISORDER: Chronic | ICD-10-CM

## 2018-06-05 DIAGNOSIS — E78.49 OTHER HYPERLIPIDEMIA: ICD-10-CM

## 2018-06-05 DIAGNOSIS — S89.91XA INJURY OF RIGHT KNEE, INITIAL ENCOUNTER: ICD-10-CM

## 2018-06-05 PROCEDURE — 73562 X-RAY EXAM OF KNEE 3: CPT | Mod: 26,RT,, | Performed by: RADIOLOGY

## 2018-06-05 PROCEDURE — 99214 OFFICE O/P EST MOD 30 MIN: CPT | Mod: S$PBB,,, | Performed by: INTERNAL MEDICINE

## 2018-06-05 PROCEDURE — 99214 OFFICE O/P EST MOD 30 MIN: CPT | Mod: PBBFAC,25,PO | Performed by: INTERNAL MEDICINE

## 2018-06-05 PROCEDURE — 73562 X-RAY EXAM OF KNEE 3: CPT | Mod: TC,PO,RT

## 2018-06-05 PROCEDURE — 99999 PR PBB SHADOW E&M-EST. PATIENT-LVL IV: CPT | Mod: PBBFAC,,, | Performed by: INTERNAL MEDICINE

## 2018-06-14 NOTE — PROGRESS NOTES
Subjective:       Patient ID: Yamel Shah is a 67 y.o. female.    Chief Complaint: Follow-up (3 month)    HPI     The patient presents for follow-up of the arthritis, iron deficiency anemia, GERD, and right knee injury.  The patient is seen a pain medicine specialist.  She is currently on tramadol.  She is being weaned off of gabapentin since this does not appear to be effective.  She has been experiencing recent symptoms of posterior neck pain and headache since tapering off of the gabapentin.  Transient nausea has also been noted.    She has been receiving chronic wound care therapy for chronic right foot pain with associated foot ulcers.    The patient has been evaluated by GI for persistent symptoms of GERD.  She is using omeprazole and Zantac at bedtime in an effort to control her reflux symptoms.  An EGD and colonoscopy have been ordered.      She has been experiencing right knee pain for the past 2 months following an injury.    Review of Systems   Constitutional: Negative for activity change, appetite change, chills, fatigue, fever and unexpected weight change.   Eyes: Negative for visual disturbance.   Respiratory: Negative for cough and shortness of breath.    Cardiovascular: Negative for chest pain, palpitations and leg swelling.   Gastrointestinal: Negative for abdominal pain, blood in stool, constipation and diarrhea.   Genitourinary: Negative for dysuria and hematuria.   Musculoskeletal: Positive for arthralgias and neck pain. Negative for neck stiffness.   Skin: Positive for wound. Negative for rash.   Neurological: Positive for headaches. Negative for dizziness and syncope.   Psychiatric/Behavioral: Negative for sleep disturbance.       Objective:      Physical Exam   Constitutional: She is oriented to person, place, and time. She appears well-developed and well-nourished. No distress.   HENT:   Head: Normocephalic and atraumatic.   Eyes: Conjunctivae and EOM are normal. No scleral icterus.    Neck: Normal range of motion. Neck supple. No JVD present. No thyromegaly present.   Cardiovascular: Normal rate, regular rhythm, normal heart sounds and intact distal pulses.  Exam reveals no gallop and no friction rub.    No murmur heard.  Pulmonary/Chest: Effort normal and breath sounds normal. No respiratory distress. She has no wheezes. She has no rales.   Abdominal: Soft. Bowel sounds are normal. She exhibits no mass. There is no tenderness.   Musculoskeletal: She exhibits tenderness and deformity.   Chronic hand joint deformities are present.  Slight tenderness is noted along the lateral aspect of the right knee on palpation.  Knee range of motion is intact.  No effusion is present.     Lymphadenopathy:     She has no cervical adenopathy.   Neurological: She is alert and oriented to person, place, and time.   Skin: Skin is warm and dry. No rash noted.   Nursing note and vitals reviewed.      Assessment:       1. Injury of right knee, initial encounter    2. Other iron deficiency anemia    3. Gastroesophageal reflux disease, esophagitis presence not specified    4. Scleroderma    5. Pulmonary hypertension associated with systemic disorder    6. Other hyperlipidemia        Plan:     Yamel was seen today for follow-up.  X-rays of the right knee will be obtained.  The patient will follow up with her pain specialist as scheduled.  Current therapy will be continued.  Blood tests and a follow-up visit in 3 months will be obtained.    Diagnoses and all orders for this visit:    Injury of right knee, initial encounter  -     Cancel: X-Ray Knee Complete 4 Or More Views Right; Future    Other iron deficiency anemia  -     CBC auto differential; Future    Gastroesophageal reflux disease, esophagitis presence not specified    Scleroderma    Pulmonary hypertension associated with systemic disorder    Other hyperlipidemia  -     Comprehensive metabolic panel; Future  -     Lipid panel; Future

## 2018-06-29 DIAGNOSIS — Z12.39 BREAST CANCER SCREENING: ICD-10-CM

## 2018-07-11 ENCOUNTER — OFFICE VISIT (OUTPATIENT)
Dept: PAIN MEDICINE | Facility: CLINIC | Age: 68
End: 2018-07-11
Attending: ANESTHESIOLOGY
Payer: MEDICARE

## 2018-07-11 VITALS
DIASTOLIC BLOOD PRESSURE: 59 MMHG | BODY MASS INDEX: 24.1 KG/M2 | HEART RATE: 69 BPM | HEIGHT: 66 IN | SYSTOLIC BLOOD PRESSURE: 132 MMHG | RESPIRATION RATE: 18 BRPM | TEMPERATURE: 98 F | WEIGHT: 149.94 LBS

## 2018-07-11 DIAGNOSIS — G60.9 IDIOPATHIC NEUROPATHY: Primary | ICD-10-CM

## 2018-07-11 DIAGNOSIS — M34.9 SCLERODERMA: ICD-10-CM

## 2018-07-11 DIAGNOSIS — M34.89 CUTANEOUS SCLERODERMA: ICD-10-CM

## 2018-07-11 PROCEDURE — 99213 OFFICE O/P EST LOW 20 MIN: CPT | Mod: S$PBB,,, | Performed by: ANESTHESIOLOGY

## 2018-07-11 PROCEDURE — 99999 PR PBB SHADOW E&M-EST. PATIENT-LVL III: CPT | Mod: PBBFAC,,, | Performed by: ANESTHESIOLOGY

## 2018-07-11 PROCEDURE — 99213 OFFICE O/P EST LOW 20 MIN: CPT | Mod: PBBFAC | Performed by: ANESTHESIOLOGY

## 2018-07-11 RX ORDER — MELOXICAM 15 MG/1
15 TABLET ORAL DAILY
Qty: 30 TABLET | Refills: 2 | Status: SHIPPED | OUTPATIENT
Start: 2018-07-11 | End: 2018-07-24

## 2018-07-11 RX ORDER — PREGABALIN 75 MG/1
CAPSULE ORAL
Qty: 60 CAPSULE | Refills: 2 | Status: SHIPPED | OUTPATIENT
Start: 2018-07-11 | End: 2018-08-30 | Stop reason: SDUPTHER

## 2018-07-11 NOTE — PROGRESS NOTES
Chronic Pain - Follow Up    Referring Physician: No ref. provider found    Chief Complaint:   No chief complaint on file.       SUBJECTIVE: Disclaimer: This note has been generated using voice-recognition software. There may be typographical errors that have been missed during proof-reading  Interval History 7/11/2018:  Since previous encounter she has weaned from gabapentin to 600mg / day and did not notice significant worsening of pain, but was having somnelence from higher doses of the medication in the past.  We are weaning in an attempt to trial Lyrica as an alternative to gabapentin.  Tramadol causes significant sedation, limiting its use.  She has discontinued the use of the tramadol.  Additionally she does have benefit from anti-inflammatory medication, has been using aleve, has not trialed CANTRELL-2 inhibitors in the past.    Interval history 05/16/2018:      The patient has had x-rays of the lumbar spine which did not reveal any evidence of significant neuroforaminal stenosis with degenerative disc disease.  There is mild facet arthropathy.  She has escalated her gabapentin and is currently taking 2100 mg of gabapentin per day without any noticeable improvement but daytime somnolence.  She continues to have nonhealing ulcers and topical pain cream is helping to a limited degree over her leg in areas where there is no skin disruption.    Initial encounter:    Yamel Shah presents to the clinic for the evaluation of foot pain. The pain started 2 years ago following ulcers and symptoms have been worsening.    Brief history: History of scleroderma    Pain Description:    The pain is located in the both feet in the area of slowly healing chronic foot ulcers which were partly from venous insufficiency and stasis associated with her scleroderma.  In her right lower extremity she describes radicular symptoms in the L4/5 distribution.    At BEST  5/10     At WORST  10/10 on the WORST day.      On average  pain is rated as 8/10.     Today the pain is rated as 8/10    The pain is described as burning, sharp, shooting and constant      Symptoms interfere with daily activity, sleeping and work.     Exacerbating factors: Laying, Walking, Night Time, Morning and Getting out of bed/chair.      Mitigating factors medications.     Patient denies night fever/night sweats, urinary incontinence, bowel incontinence, significant weight loss, significant motor weakness and loss of sensations.  Patient denies any suicidal or homicidal ideations    Pain Medications:  Current:  Aleve  Gabapentin 2100    Tried in Past:  NSAIDs -Never  TCA -Never  SNRI -Never  Anti-convulsants -Never  Muscle Relaxants -Never  Opioids-Never    Physical Therapy/Home Exercise: no       report:  Reviewed and consistent with medication use as prescribed.    Pain Procedures:     Chiropractor -never  Acupuncture - never  TENS unit -never  Spinal decompression -never  Joint replacement -never    Imaging:   X-ray lumbar spine 6/1/2016:  2 views: Alignment is normal. There is mild DJD. No fracture dislocation bone destruction seen.   Impression      Mild DJD.     Xray lumbar spine 4/2018  EXAMINATION:  XR LUMBAR SPINE 5 VIEW WITH FLEX AND EXT    CLINICAL HISTORY:  evaluate facet arthropathy and r/o instability and neuroforaminal stenosis;  Radiculopathy, lumbar region    TECHNIQUE:  Five views of the lumbar spine plus flexion extension views were performed.    COMPARISON:  June 2016    FINDINGS:  There is slight curvature of the lumbar spine.  The vertebral bodies are normally aligned and normal in height.  Mild disc space narrowing present at L5-S1.  There is mild facet degenerative change in the lower spine.  No significant osteophytic spurring present.  There is no change in alignment with flexion or extension.      Past Medical History:   Diagnosis Date    Abnormal Pap smear     Acid reflux     Allergy     Arthritis     GERD (gastroesophageal reflux  disease)     History of migraine headaches     Hypertension     Idiopathic neuropathy 7/20/2012    ILD (interstitial lung disease) 11/6/2013    Iron deficiency anemia 3/18/2014    MRSA carrier     Osteopenia     Pulmonary fibrosis     Pulmonary hypertension     Raynaud's disease     Scleroderma, diffuse     Sjogren's syndrome     Vitamin D deficiency 11/14/2013     Past Surgical History:   Procedure Laterality Date    BREAST BIOPSY      Left, benign    CERVICAL CONIZATION   W/ LASER  1970    DILATION AND CURETTAGE OF UTERUS      HYSTERECTOMY  1990    FRANDY (AUB, Fibroids), ovaries remain     Social History     Social History    Marital status:      Spouse name: Lewis    Number of children: 4    Years of education: N/A     Occupational History    -retired [employer]     Retired     Social History Main Topics    Smoking status: Never Smoker    Smokeless tobacco: Never Used    Alcohol use 0.0 oz/week      Comment: ocassional glass of wine    Drug use: No    Sexual activity: Yes     Partners: Male     Other Topics Concern    Are You Pregnant Or Think You May Be? No    Breast-Feeding No     Social History Narrative         Family History   Problem Relation Age of Onset    Breast cancer Mother     Stroke Father     Breast cancer Sister     Breast cancer Maternal Aunt     Melanoma Neg Hx     Colon cancer Neg Hx     Crohn's disease Neg Hx     Stomach cancer Neg Hx     Ulcerative colitis Neg Hx     Rectal cancer Neg Hx     Irritable bowel syndrome Neg Hx     Esophageal cancer Neg Hx     Celiac disease Neg Hx        Review of patient's allergies indicates:   Allergen Reactions    Sulfa (sulfonamide antibiotics)      Other reaction(s): Rash       Current Outpatient Prescriptions   Medication Sig    ADCIRCA 20 mg Tab TAKE TWO  TABLETS (40MG) BY MOUTH ONCE DAILY. CALL [phone] TO REFILL.    albuterol 90 mcg/actuation inhaler Inhale  2 puffs into the lungs every 4 (four) hours as needed for Wheezing.    desonide (DESOWEN) 0.05 % cream Apply topically 2 (two) times daily.    ergocalciferol (ERGOCALCIFEROL) 50,000 unit Cap Take 1 capsule (50,000 Units total) by mouth every 7 days.    ferrous sulfate 325 (65 FE) MG EC tablet Take 1 tablet (325 mg total) by mouth 3 (three) times daily.    gabapentin (NEURONTIN) 300 MG capsule Take 2 in the morning, 2 pills in the afternoon, and 3 pills at night.    lidocaine-prilocaine (EMLA) cream     multivitamin capsule Take 1 capsule by mouth once daily.     NIFEdipine (ADALAT CC) 90 MG TbSR TAKE 1 TABLET(90 MG) BY MOUTH EVERY DAY    omeprazole (PRILOSEC) 40 MG capsule Take 1 capsule (40 mg total) by mouth before breakfast.    PATADAY 0.2 % Drop 1 drop once daily.     pravastatin (PRAVACHOL) 20 MG tablet Take 1 tablet (20 mg total) by mouth every evening.    PREVIDENT 5000 BOOSTER PLUS 1.1 % Pste     ranitidine (ZANTAC) 150 MG capsule Take 1 capsule (150 mg total) by mouth 2 (two) times daily.    traMADol (ULTRAM) 50 mg tablet Take 1 tablet (50 mg total) by mouth every 8 (eight) hours as needed for Pain.     No current facility-administered medications for this visit.        REVIEW OF SYSTEMS:    GENERAL:  No weight loss, malaise or fevers.  HEENT:   No recent changes in vision or hearing  NECK:  Negative for lumps,  difficulty with swallowing associated with scleroderma.  RESPIRATORY:  Negative for cough, wheezing or shortness of breath, patient denies any recent URI. Albuterol (history of ILD)  CARDIOVASCULAR:  Negative for chest pain, leg swelling or palpitations.  GI:  Negative for abdominal discomfort, blood in stools or black stools or change in bowel habits. GERD controlled zantac  MUSCULOSKELETAL:  See HPI.  SKIN:   Chronic low healing venous stasis ulcerations bilateral lower extremities   PSYCH:  No mood disorder or recent psychosocial stressors.  Patients sleep is not disturbed secondary  "to pain.  HEMATOLOGY/LYMPHOLOGY:   History of iron deficiency anemia states that she received several units of blood several months ago but has been having improved feeling in her feet since transfusion, takes daily iron supplementation.    ENDO: No history of diabetes or thyroid dysfunction  NEURO:   No history of headaches, syncope, paralysis, seizures or tremors.  All other reviewed and negative other than HPI.    OBJECTIVE:    BP (!) 132/59   Pulse 69   Temp 97.8 °F (36.6 °C) (Oral)   Resp 18   Ht 5' 6" (1.676 m)   Wt 68 kg (149 lb 14.6 oz)   BMI 24.20 kg/m²     PHYSICAL EXAMINATION:    GENERAL: Well appearing, in no acute distress, alert and oriented x3.  PSYCH:  Mood and affect appropriate.  SKIN: Tight skin associated with scleroderma. Pictures of foot ulcers seen in wound consult note, dressings not removed in clinic today  HEAD/FACE:  Normocephalic, atraumatic. Cranial nerves grossly intact.  CV: RRR with palpation of the radial artery.  PULM: No evidence of respiratory difficulty, symmetric chest rise.  BACK:  There is pain with palpation over the facet joints of the lumbar spine bilaterally. There is decreased range of motion with extension to 15 degrees, and facet loading maneuvers cause reproducible pain.    EXTREMITIES: contractures over on bilateral hands with ulcerations.  MUSCULOSKELETAL:  There is no pain to palpation over the greater trochanteric bursa bilaterally.  FABERs test is positive.  FADIRs test is negative.   Bilateral lower extremity strength testing limited secondary to pain in the feet.    NEURO: Bilateral lower extremity coordination and muscle stretch reflexes are physiologic and symmetric.  Plantar response are downgoing. No clonus.  No loss of sensation is noted.  GAIT: Antalgic, ambulates without assistance         Lab Results   Component Value Date    WBC 4.33 06/01/2018    HGB 12.4 06/01/2018    HCT 36.3 (L) 06/01/2018    MCV 96 06/01/2018     06/01/2018 "     BMP  Lab Results   Component Value Date     06/01/2018    K 4.1 06/01/2018     06/01/2018    CO2 25 06/01/2018    BUN 20 06/01/2018    CREATININE 0.7 06/01/2018    CALCIUM 8.9 06/01/2018    ANIONGAP 6 (L) 06/01/2018    ESTGFRAFRICA >60.0 06/01/2018    EGFRNONAA >60.0 06/01/2018         ASSESSMENT: 67 y.o. year old female with pain, consistent with     Encounter Diagnoses   Name Primary?    Idiopathic neuropathy Yes    Scleroderma     Cutaneous scleroderma        PLAN:   It is uncertain if gabapentin is helping for her lower extremity pain is radicular versus vascular at this time. Based on x-ray imaging does not appear to be any significant neuroforaminal stenosis.   She has sufficiently weaned her gabapentin.  We will make the transition to Lyrica.      Discontinue use naproxen, trial meloxicam 15mg/day for several days before transition to lyrica to r/o any side effects from the medication.    In the future she may require further vascular workup.    Topical compounded pain cream to apply over painful areas without applying to any open or broken skin    Discontinue use of tramadol    F/u in 1 month      Adalgisa King  07/11/2018

## 2018-07-24 ENCOUNTER — OFFICE VISIT (OUTPATIENT)
Dept: RHEUMATOLOGY | Facility: CLINIC | Age: 68
End: 2018-07-24
Payer: MEDICARE

## 2018-07-24 VITALS
DIASTOLIC BLOOD PRESSURE: 67 MMHG | HEIGHT: 66 IN | SYSTOLIC BLOOD PRESSURE: 116 MMHG | HEART RATE: 78 BPM | WEIGHT: 151.19 LBS | BODY MASS INDEX: 24.3 KG/M2

## 2018-07-24 DIAGNOSIS — I27.29 PULMONARY HYPERTENSION ASSOCIATED WITH SYSTEMIC DISORDER: Chronic | ICD-10-CM

## 2018-07-24 DIAGNOSIS — L97.529 SKIN ULCERS OF BOTH FEET: ICD-10-CM

## 2018-07-24 DIAGNOSIS — L97.519 SKIN ULCERS OF BOTH FEET: ICD-10-CM

## 2018-07-24 DIAGNOSIS — M34.9 SCLERODERMA: Primary | ICD-10-CM

## 2018-07-24 DIAGNOSIS — J84.9 ILD (INTERSTITIAL LUNG DISEASE): ICD-10-CM

## 2018-07-24 DIAGNOSIS — K21.9 GASTROESOPHAGEAL REFLUX DISEASE, ESOPHAGITIS PRESENCE NOT SPECIFIED: ICD-10-CM

## 2018-07-24 DIAGNOSIS — M85.80 OSTEOPENIA, UNSPECIFIED LOCATION: ICD-10-CM

## 2018-07-24 DIAGNOSIS — I78.1 TELANGIECTASIA: ICD-10-CM

## 2018-07-24 PROCEDURE — 99999 PR PBB SHADOW E&M-EST. PATIENT-LVL V: CPT | Mod: PBBFAC,GC,, | Performed by: INTERNAL MEDICINE

## 2018-07-24 PROCEDURE — 99215 OFFICE O/P EST HI 40 MIN: CPT | Mod: PBBFAC | Performed by: INTERNAL MEDICINE

## 2018-07-24 PROCEDURE — 99215 OFFICE O/P EST HI 40 MIN: CPT | Mod: S$PBB,GC,, | Performed by: INTERNAL MEDICINE

## 2018-07-24 NOTE — PATIENT INSTRUCTIONS
Labs in 3 months    CT chest and lung volumes and DEXA    Continue Nifedipine and Adcirca    Referral placed for vascular surgery and GI

## 2018-07-24 NOTE — PROGRESS NOTES
"Subjective:       Patient ID: Yamel Shah is a 67 y.o. female.    Chief Complaint: No chief complaint on file.    HPI   67YF with Systemic scleroderma, diagnosed 1983- She has been seeing Dr. Ahuja for over 10 years.( +SCL-70, -RNAP3, ILD). She has stable ILD on imaging 9/2015 and had mild exercise induced pulmonary hypertension in 2013 that has resolved on 6/2015 RHC and repeat echo. PFTs 4/2016 show normal DLCO, RV, FEV1. Mild restriction. Repeat PFTs 03/2018 show normal DLco, decreased RV (89-->64), SVC remains the same at 70 and FVC 70.    Since last visit 04/2018, pt was weaned off gabapentin and placed on tramadol by pain management which has been discontinued due to intolerance and is now on Lyrica. Seen by Dr Lim with repeat 2D ECHO with normal EF ,sPAP 28. "From a PH standpoint, based on her echo today and last RHC she appears to be doing very well- PFTs also surprisingly good, chest imaging stable"  Pt's main compliant is the ulcers on her feet. She had seen Vascular surgery in 03/2018 who will consider EVLT once ulcers have healed. Pt reports compliance with adcirca 40 mg and nifedipine 90mg daily.    Previous hx  Pt was seen in clinic on 2/22/18 and found to have a significant ROXANNE, hb 5 which required hospital admission and 2 units of pRBCs. Her anemia has resolved, she is currently on iron 325 tid. Pt will undergo gi w/u with EGD given anemia and dysphagia. 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   In the past pt's digital ulcerations required admission and treatment with epoprostenol (6/2015 and Sept/2016). She does not feel epo helped with the wounds. She is on adcirca 40 mg  and nifedipine 90mg daily. She did not qualify for Bosentan in the past.     Review of Systems   Constitutional: Negative for chills and fever.   HENT: Negative for dental problem, mouth sores and trouble swallowing.    Eyes: Negative for visual disturbance.   Respiratory: Negative for chest tightness and shortness " "of breath.    Cardiovascular: Negative for chest pain and leg swelling.   Gastrointestinal: Negative for abdominal pain, blood in stool, diarrhea, nausea and vomiting.   Endocrine: Negative for polyuria.   Genitourinary: Negative for difficulty urinating, dysuria, hematuria and urgency.   Musculoskeletal: Negative for arthralgias, joint swelling and neck pain.   Skin: Positive for color change and wound. Negative for rash.   Neurological: Negative for dizziness, weakness and numbness.   Psychiatric/Behavioral: Negative for decreased concentration and sleep disturbance.         Objective:   /67   Pulse 78   Ht 5' 6" (1.676 m)   Wt 68.6 kg (151 lb 3.2 oz)   BMI 24.40 kg/m²      Physical Exam   Constitutional: She is oriented to person, place, and time and well-developed, well-nourished, and in no distress.   HENT:   Head: Normocephalic and atraumatic.   Eyes: EOM are normal. Pupils are equal, round, and reactive to light.   Neck: Normal range of motion. Neck supple.   Cardiovascular: Normal rate and regular rhythm.    Pulmonary/Chest: Effort normal and breath sounds normal.   Abdominal: Soft. Bowel sounds are normal.   Neurological: She is alert and oriented to person, place, and time.   Skin: Skin is warm and dry.     Psychiatric: Affect normal.   Musculoskeletal: Normal range of motion. She exhibits deformity. She exhibits no edema or tenderness.   mRSS 25  Telangiectasias on the face and upper chest wall  Skin tightening b/l  Claw hand deformity. Flexion contractures b/l.                    Results for DANA GARCÍA (MRN [id number]) as of 7/24/2018 13:08   Ref. Range 3/24/2018 08:36 6/1/2018 09:18   WBC Latest Ref Range: 3.90 - 12.70 K/uL 4.41 4.33   RBC Latest Ref Range: 4.00 - 5.40 M/uL 4.20 3.78 (L)   Hemoglobin Latest Ref Range: 12.0 - 16.0 g/dL 12.1 12.4   Hematocrit Latest Ref Range: 37.0 - 48.5 % 35.7 (L) 36.3 (L)   MCV Latest Ref Range: 82 - 98 fL 85 96   MCH Latest Ref Range: 27.0 - 31.0 pg " 28.8 32.8 (H)   MCHC Latest Ref Range: 32.0 - 36.0 g/dL 33.9 34.2   RDW Latest Ref Range: 11.5 - 14.5 % SEE COMMENT 14.5   Platelets Latest Ref Range: 150 - 350 K/uL 199 176   MPV Latest Ref Range: 9.2 - 12.9 fL SEE COMMENT 10.9   Gran% Latest Ref Range: 38.0 - 73.0 % 54.0 55.0   Gran # (ANC) Latest Ref Range: 1.8 - 7.7 K/uL 2.4 2.4   Immature Granulocytes Latest Ref Range: 0.0 - 0.5 % 0.2 0.2   Immature Grans (Abs) Latest Ref Range: 0.00 - 0.04 K/uL 0.01 0.01   Lymph% Latest Ref Range: 18.0 - 48.0 % 35.6 32.8   Lymph # Latest Ref Range: 1.0 - 4.8 K/uL 1.6 1.4   Mono% Latest Ref Range: 4.0 - 15.0 % 7.9 8.5   Mono # Latest Ref Range: 0.3 - 1.0 K/uL 0.4 0.4   Eosinophil% Latest Ref Range: 0.0 - 8.0 % 1.4 2.8   Eos # Latest Ref Range: 0.0 - 0.5 K/uL 0.1 0.1   Basophil% Latest Ref Range: 0.0 - 1.9 % 0.9 0.7   Baso # Latest Ref Range: 0.00 - 0.20 K/uL 0.04 0.03   nRBC Latest Ref Range: 0 /100 WBC 0 0     Results for DANA GARCÍA (MRN [id number]) as of 7/24/2018 13:08   Ref. Range 3/24/2018 08:36 6/1/2018 09:18   Sodium Latest Ref Range: 136 - 145 mmol/L 140 141   Potassium Latest Ref Range: 3.5 - 5.1 mmol/L 4.1 4.1   Chloride Latest Ref Range: 95 - 110 mmol/L 107 110   CO2 Latest Ref Range: 23 - 29 mmol/L 25 25   Anion Gap Latest Ref Range: 8 - 16 mmol/L 8 6 (L)   BUN, Bld Latest Ref Range: 8 - 23 mg/dL 15 20   Creatinine Latest Ref Range: 0.5 - 1.4 mg/dL 0.7 0.7   eGFR if non African American Latest Ref Range: >60 mL/min/1.73 m^2 >60.0 >60.0   eGFR if African American Latest Ref Range: >60 mL/min/1.73 m^2 >60.0 >60.0   Glucose Latest Ref Range: 70 - 110 mg/dL 90 93   Calcium Latest Ref Range: 8.7 - 10.5 mg/dL 9.1 8.9   Alkaline Phosphatase Latest Ref Range: 55 - 135 U/L 94 99   Total Protein Latest Ref Range: 6.0 - 8.4 g/dL 8.5 (H) 8.2   Albumin Latest Ref Range: 3.5 - 5.2 g/dL 3.3 (L) 3.3 (L)   Total Bilirubin Latest Ref Range: 0.1 - 1.0 mg/dL 0.4 0.5   AST Latest Ref Range: 10 - 40 U/L 20 20   ALT Latest Ref  Range: 10 - 44 U/L 9 (L) 10     Results for DANA GARCÍA (MRN [id number]) as of 7/24/2018 13:08   Ref. Range 11/10/2009 16:40 1/7/2015 12:33   Anti-SSA Antibody Latest Units: .22 (A)    Anti-SSA Interpretation Unknown Positive (A)    Anti-SSB Antibody Latest Units: EU 16.46    Anti-SSB Interpretation Unknown Negative    ds DNA Ab Latest Ref Range: Negative Titer Negative    Scleroderma SCL- Latest Ref Range: <20 UNITS  133 (H)   Complement (C-3) Latest Ref Range: 50 - 180 mg/dl 87    Complement (C-4) Latest Ref Range: 11 - 44 mg/dl 10 (L)    Complement,Total, Serum Latest Ref Range: 30 - 75 U/mL 43       Results for DANA GARCÍA (MRN [id number]) as of 7/24/2018 13:08   Ref. Range 1/13/2017 08:20   IgG - Serum Latest Ref Range: 650 - 1600 mg/dL 2625 (H)   IgM Latest Ref Range: 50 - 300 mg/dL 121   IgA Latest Ref Range: 40 - 350 mg/dL 487 (H)     SPEP 1/2017  Electronically reviewed and signed by:   Camila Soliman M.D.   Signed on 01/16/17 at 15:54   Increased total protein. Increased gamma globulin, polyclonal.     EGD 01/2017  Normal esophagus. Two biopsies were obtained in                         the upper third of the esophagus.                        - Z-line irregular. Biopsied.                        - Esophageal polyp(s) were found. Resected and                         retrieved.                        - Normal stomach.                        - Congested duodenal mucosa. Biopsied.  Recommendation:       - Await pathology results.                        - Discharge patient to home (ambulatory).                        - Resume previous diet.                        - Continue present medications.                        - Repeat the upper endoscopy in 6 months to assure                         complete removal of polyp at GE junction.    2D ECHO 06/2018  CONCLUSIONS     1 - Upper limit of normal left ventricular enlargement.     2 - Normal left ventricular systolic function (EF 60-65%).     3 -  No wall motion abnormalities.     4 - Normal left ventricular diastolic function.     5 - Biatrial enlargement.     6 - Normal right ventricular systolic function .     7 - Trivial mitral regurgitation.     8 - Mild tricuspid regurgitation.     9 - Trivial pulmonic regurgitation.     10 - The estimated PA systolic pressure is 28 mmHg.     CT chest 09/2015  Findings:  Examination of the vascular and soft tissue structures at the base of the neck is unremarkable.  The thoracic aorta maintains normal caliber, contour, and course without significant atherosclerotic calcification within its course.  The heart is not enlarged and there is no evidence of pericardial effusion. The esophagus is mildly dilated and   retained pneumatized secretions are identified within the dependent portion of the esophagus placing the patient at risk for aspiration and also suggestive esophageal dysmotility associated with scleroderma or reflux. There is no axillary, mediastinal,   or hilar lymphadenopathy.    A benign appearing but enlarged left axillary lymph node is identified, which appears stable to the prior examination dated 11/27/07.  Scattered normal sized and benign appearing mediastinal lymphadenopathy is also appreciated, also grossly similar to   the prior examination.     The lungs again are symmetrically expanded and demonstrate chronic generalized lung disease with small opacities and reticulations in a peripheral predominance.  This again is most notable in the lung bases were there is dilatation of small airways and   faint groundglass opacity.  Findings appear grossly stable to the prior chest CT in 2007 and are consistent with interstitial lung disease as can be seen with scleroderma, pulmonary fibrosis or interstitial pneumonitis.  No focal mass lesion is   identified.  Limited views of the abdomen demonstrate nothing unusual on this noncontrast examination.  The osseous structures demonstrate mild degenerative changes  and a stable appearing compression deformity of the T9 vertebral body.    PFTs       FVC     SVC      RV     DLco    3/6/18      64         70         64      112  4/8/16      70         70         89       84  9/4/15      66         62         72       71  1/19/15    66           2/14/14    64                                 87  Assessment:       1. Scleroderma    2. Telangiectasia    3. ILD (interstitial lung disease)    4. Pulmonary hypertension associated with systemic disorder    5. Osteopenia, unspecified location            Plan:         Diagnoses and all orders for this visit:    Scleroderma ( +scl-70, neg RNAP3, Raynauds, ILD, GERD, sclerodactyly with claw hand + telangiectasias, exercise induced pHTN (resolved 6/2015 RHC) )  mRSS increased 20-->25 since last visit. Lung volumes DLco wnl however decrease in RV, will obtain CT chest for further evaluation of ILD  Labs show normocytic anemia, platelet wnl. Metabolic profile with normal renal and liver function.   Continue Nifedipine and adcirca  Continue statin  Repeat lung volumes   May consider therapy with Cellcept pending repeat CT chest and lung volumes  -     Sedimentation rate; Standing  -     CBC auto differential; Standing  -     C-reactive protein; Standing  -     Comprehensive metabolic panel; Standing  -     CT Chest Without Contrast; Future  -     Spriometry w/Tracings; Future  -     DLCO; Future  -     Lung Volume; Future  -     Ambulatory referral to Vascular Surgery  -     Ambulatory Referral to Gastroenterology    Telangiectasia    ILD (interstitial lung disease)  -     CT Chest Without Contrast; Future  -     Ambulatory Referral to Gastroenterology    Pulmonary hypertension associated with systemic disorder  Exercise induced Pulmonary hypertension RHC 1/13- resolved on RHC 6/2015.  Stable. Doesn't qualify for Bosentan. She is on Adcirca 40mg daily  -     Ambulatory Referral to Gastroenterology    Osteopenia, unspecified location  DEXA  01/2015  Repeat DEXA    Skin ulcers of both feet  Re-refer to Vascular for evaluation for consideration of EVLT. Pt also wanted to know if hyperbaric oxygen will help with her ulcers. Advised her to discuss with Vascular surgery.  Advised to take Lyrica 75mg BID  -     Ambulatory referral to Vascular Surgery    Gastroesophageal reflux disease, esophagitis presence not specified  Refer to GI for evaluation of dysphagia  Pt was supposed to have repeat EGD 05/2018 which was not done.     Other orders  -     (In Office Administered) Tdap Vaccine  -     (In Office Administered) Zoster Recombinant Vaccine

## 2018-08-30 ENCOUNTER — OFFICE VISIT (OUTPATIENT)
Dept: PAIN MEDICINE | Facility: CLINIC | Age: 68
End: 2018-08-30
Payer: MEDICARE

## 2018-08-30 VITALS
SYSTOLIC BLOOD PRESSURE: 112 MMHG | BODY MASS INDEX: 25.01 KG/M2 | DIASTOLIC BLOOD PRESSURE: 56 MMHG | HEIGHT: 66 IN | HEART RATE: 69 BPM | WEIGHT: 155.63 LBS | TEMPERATURE: 97 F

## 2018-08-30 DIAGNOSIS — M34.89 CUTANEOUS SCLERODERMA: ICD-10-CM

## 2018-08-30 DIAGNOSIS — M34.9 SCLERODERMA: ICD-10-CM

## 2018-08-30 DIAGNOSIS — G60.9 IDIOPATHIC NEUROPATHY: Primary | ICD-10-CM

## 2018-08-30 DIAGNOSIS — G60.9 IDIOPATHIC NEUROPATHY: ICD-10-CM

## 2018-08-30 PROCEDURE — 99213 OFFICE O/P EST LOW 20 MIN: CPT | Mod: S$PBB,,, | Performed by: NURSE PRACTITIONER

## 2018-08-30 PROCEDURE — 99213 OFFICE O/P EST LOW 20 MIN: CPT | Mod: PBBFAC | Performed by: NURSE PRACTITIONER

## 2018-08-30 PROCEDURE — 99999 PR PBB SHADOW E&M-EST. PATIENT-LVL III: CPT | Mod: PBBFAC,,, | Performed by: NURSE PRACTITIONER

## 2018-08-30 RX ORDER — CELECOXIB 200 MG/1
200 CAPSULE ORAL 2 TIMES DAILY
Qty: 60 CAPSULE | Refills: 0 | Status: SHIPPED | OUTPATIENT
Start: 2018-08-30 | End: 2018-08-30 | Stop reason: SDUPTHER

## 2018-08-30 RX ORDER — PREGABALIN 75 MG/1
75 CAPSULE ORAL 3 TIMES DAILY
Qty: 90 CAPSULE | Refills: 1 | Status: SHIPPED | OUTPATIENT
Start: 2018-08-30 | End: 2018-11-20 | Stop reason: SDUPTHER

## 2018-08-30 RX ORDER — CELECOXIB 200 MG/1
CAPSULE ORAL
Qty: 180 CAPSULE | Refills: 0 | Status: SHIPPED | OUTPATIENT
Start: 2018-08-30 | End: 2018-11-15 | Stop reason: SDUPTHER

## 2018-08-30 NOTE — PROGRESS NOTES
Chronic Pain - Follow Up    Referring Physician: No ref. provider found    Chief Complaint:   Chief Complaint   Patient presents with    Follow-up        SUBJECTIVE: Disclaimer: This note has been generated using voice-recognition software. There may be typographical errors that have been missed during proof-reading    Interval History 8/30/2018:  The patient returns to clinic today for follow up. She continues to report bilateral foot pain that is burning and tingling in nature. She reports that this pain is worse at night. She is currently taking Lyrica 75 mg BID with limited relief. She did not have any benefit with Mobic. She continues to take Aleve with some benefit. She continues to have nonhealing ulcers. She wears an ACE bandage to her right calf, as well as boots to her bilateral feet. She denies any other health changes. Her pain today is 7/10.     Interval History 7/11/2018:  Since previous encounter she has weaned from gabapentin to 600mg / day and did not notice significant worsening of pain, but was having somnelence from higher doses of the medication in the past.  We are weaning in an attempt to trial Lyrica as an alternative to gabapentin.  Tramadol causes significant sedation, limiting its use.  She has discontinued the use of the tramadol.  Additionally she does have benefit from anti-inflammatory medication, has been using aleve, has not trialed CANTRELL-2 inhibitors in the past.    Interval history 05/16/2018:      The patient has had x-rays of the lumbar spine which did not reveal any evidence of significant neuroforaminal stenosis with degenerative disc disease.  There is mild facet arthropathy.  She has escalated her gabapentin and is currently taking 2100 mg of gabapentin per day without any noticeable improvement but daytime somnolence.  She continues to have nonhealing ulcers and topical pain cream is helping to a limited degree over her leg in areas where there is no skin  disruption.    Initial encounter:    Yamel Shah presents to the clinic for the evaluation of foot pain. The pain started 2 years ago following ulcers and symptoms have been worsening.    Brief history: History of scleroderma    Pain Description:    The pain is located in the both feet in the area of slowly healing chronic foot ulcers which were partly from venous insufficiency and stasis associated with her scleroderma.  In her right lower extremity she describes radicular symptoms in the L4/5 distribution.    At BEST  5/10     At WORST  10/10 on the WORST day.      On average pain is rated as 8/10.     Today the pain is rated as 8/10    The pain is described as burning, sharp, shooting and constant      Symptoms interfere with daily activity, sleeping and work.     Exacerbating factors: Laying, Walking, Night Time, Morning and Getting out of bed/chair.      Mitigating factors medications.     Patient denies night fever/night sweats, urinary incontinence, bowel incontinence, significant weight loss, significant motor weakness and loss of sensations.  Patient denies any suicidal or homicidal ideations    Pain Medications:  Current:  Aleve  Lyrica 75 mg BID    Tried in Past:  NSAIDs -Aleve, Mobic  TCA -Never  SNRI -Never  Anti-convulsants - Gabapentin, Lyrica  Muscle Relaxants -Never  Opioids-Tramadol    Physical Therapy/Home Exercise: no       report:  Reviewed and consistent with medication use as prescribed.    Pain Procedures: none    Chiropractor -never  Acupuncture - never  TENS unit -never  Spinal decompression -never  Joint replacement -never    Imaging:   X-ray lumbar spine 6/1/2016:  2 views: Alignment is normal. There is mild DJD. No fracture dislocation bone destruction seen.   Impression      Mild DJD.     Xray lumbar spine 4/2018:  COMPARISON:  June 2016    FINDINGS:  There is slight curvature of the lumbar spine.  The vertebral bodies are normally aligned and normal in height.  Mild disc  space narrowing present at L5-S1.  There is mild facet degenerative change in the lower spine.  No significant osteophytic spurring present.  There is no change in alignment with flexion or extension.      Past Medical History:   Diagnosis Date    Abnormal Pap smear     Acid reflux     Allergy     Arthritis     GERD (gastroesophageal reflux disease)     History of migraine headaches     Hypertension     Idiopathic neuropathy 7/20/2012    ILD (interstitial lung disease) 11/6/2013    Iron deficiency anemia 3/18/2014    MRSA carrier     Osteopenia     Pulmonary fibrosis     Pulmonary hypertension     Raynaud's disease     Scleroderma, diffuse     Sjogren's syndrome     Vitamin D deficiency 11/14/2013     Past Surgical History:   Procedure Laterality Date    BREAST BIOPSY      Left, benign    CERVICAL CONIZATION   W/ LASER  1970    DILATION AND CURETTAGE OF UTERUS      HYSTERECTOMY  1990    FRANDY (AUB, Fibroids), ovaries remain     Social History     Socioeconomic History    Marital status:      Spouse name: Lewis    Number of children: 4    Years of education: Not on file    Highest education level: Not on file   Social Needs    Financial resource strain: Not on file    Food insecurity - worry: Not on file    Food insecurity - inability: Not on file    Transportation needs - medical: Not on file    Transportation needs - non-medical: Not on file   Occupational History    Occupation: -retired     Employer: [employer]     Comment: [place]     Employer: RETIRED   Tobacco Use    Smoking status: Never Smoker    Smokeless tobacco: Never Used   Substance and Sexual Activity    Alcohol use: Yes     Alcohol/week: 0.0 oz     Comment: ocassional glass of wine    Drug use: No    Sexual activity: Yes     Partners: Male   Other Topics Concern    Are you pregnant or think you may be? No    Breast-feeding No   Social History Narrative         Family History    Problem Relation Age of Onset    Breast cancer Mother     Stroke Father     Breast cancer Sister     Breast cancer Maternal Aunt     Melanoma Neg Hx     Colon cancer Neg Hx     Crohn's disease Neg Hx     Stomach cancer Neg Hx     Ulcerative colitis Neg Hx     Rectal cancer Neg Hx     Irritable bowel syndrome Neg Hx     Esophageal cancer Neg Hx     Celiac disease Neg Hx        Review of patient's allergies indicates:   Allergen Reactions    Sulfa (sulfonamide antibiotics)      Other reaction(s): Rash       Current Outpatient Medications   Medication Sig    ADCIRCA 20 mg Tab TAKE TWO  TABLETS (40MG) BY MOUTH ONCE DAILY. CALL [phone] TO REFILL.    albuterol 90 mcg/actuation inhaler Inhale 2 puffs into the lungs every 4 (four) hours as needed for Wheezing.    desonide (DESOWEN) 0.05 % cream Apply topically 2 (two) times daily.    ergocalciferol (ERGOCALCIFEROL) 50,000 unit Cap Take 1 capsule (50,000 Units total) by mouth every 7 days.    ferrous sulfate 325 (65 FE) MG EC tablet Take 1 tablet (325 mg total) by mouth 3 (three) times daily.    lidocaine-prilocaine (EMLA) cream     multivitamin capsule Take 1 capsule by mouth once daily.     NIFEdipine (ADALAT CC) 90 MG TbSR TAKE 1 TABLET(90 MG) BY MOUTH EVERY DAY    omeprazole (PRILOSEC) 40 MG capsule Take 1 capsule (40 mg total) by mouth before breakfast.    PATADAY 0.2 % Drop 1 drop once daily.     pravastatin (PRAVACHOL) 20 MG tablet Take 1 tablet (20 mg total) by mouth every evening.    pregabalin (LYRICA) 75 MG capsule Take 1 at night for 7 days, then increase to morning and night    PREVIDENT 5000 BOOSTER PLUS 1.1 % Pste     ranitidine (ZANTAC) 150 MG capsule Take 1 capsule (150 mg total) by mouth 2 (two) times daily.     No current facility-administered medications for this visit.        REVIEW OF SYSTEMS:    GENERAL:  No weight loss, malaise or fevers.  HEENT:   No recent changes in vision or hearing  NECK:  Negative for lumps,   "difficulty with swallowing associated with scleroderma.  RESPIRATORY:  Negative for cough, wheezing or shortness of breath, patient denies any recent URI. Albuterol (history of ILD)  CARDIOVASCULAR:  Negative for chest pain, leg swelling or palpitations.  GI:  Negative for abdominal discomfort, blood in stools or black stools or change in bowel habits. GERD controlled zantac  MUSCULOSKELETAL:  See HPI.  SKIN:   Chronic low healing venous stasis ulcerations bilateral lower extremities   PSYCH:  No mood disorder or recent psychosocial stressors.  Patients sleep is not disturbed secondary to pain.  HEMATOLOGY/LYMPHOLOGY:   History of iron deficiency anemia states that she received several units of blood several months ago but has been having improved feeling in her feet since transfusion, takes daily iron supplementation.    ENDO: No history of diabetes or thyroid dysfunction  NEURO:   No history of headaches, syncope, paralysis, seizures or tremors.  All other reviewed and negative other than HPI.    OBJECTIVE:    BP (!) 112/56   Pulse 69   Temp 97.2 °F (36.2 °C)   Ht 5' 6" (1.676 m)   Wt 70.6 kg (155 lb 10.3 oz)   BMI 25.12 kg/m²     PHYSICAL EXAMINATION:    GENERAL: Well appearing, in no acute distress, alert and oriented x3.  PSYCH:  Mood and affect appropriate.  SKIN: Tight skin associated with scleroderma. Pictures of foot ulcers seen in wound consult note, dressings not removed in clinic today  HEAD/FACE:  Normocephalic, atraumatic. Cranial nerves grossly intact.  CV: RRR with palpation of the radial artery.  PULM: No evidence of respiratory difficulty, symmetric chest rise.  BACK:  There is mild pain with palpation over the facet joints of the lumbar spine bilaterally. Limited ROM with mild pain on extension. Positive facet loading bilaterally.    EXTREMITIES: contractures over on bilateral hands with ulcerations. RLE with ACE wrap. Boots noted to bilateral feet.   MUSCULOSKELETAL:  There is no pain to " palpation over the greater trochanteric bursa bilaterally.  FABERs test is positive.  FADIRs test is negative.   Bilateral lower extremity strength testing limited secondary to pain in the feet.    NEURO: Bilateral lower extremity coordination and muscle stretch reflexes are physiologic and symmetric.  Plantar response are downgoing. No clonus.  No loss of sensation is noted.  GAIT: Antalgic, ambulates without assistance         Lab Results   Component Value Date    WBC 4.33 06/01/2018    HGB 12.4 06/01/2018    HCT 36.3 (L) 06/01/2018    MCV 96 06/01/2018     06/01/2018     BMP  Lab Results   Component Value Date     06/01/2018    K 4.1 06/01/2018     06/01/2018    CO2 25 06/01/2018    BUN 20 06/01/2018    CREATININE 0.7 06/01/2018    CALCIUM 8.9 06/01/2018    ANIONGAP 6 (L) 06/01/2018    ESTGFRAFRICA >60.0 06/01/2018    EGFRNONAA >60.0 06/01/2018         ASSESSMENT: 67 y.o. year old female with pain, consistent with     Encounter Diagnoses   Name Primary?    Idiopathic neuropathy Yes    Scleroderma     Cutaneous scleroderma        PLAN:     - Previous imaging was reviewed and discussed with the patient today.    - Increase Lyrica to 75 mg TID. Medication management provided by Dr. King.     - Trial Celebrex 200 mg BID. Discontinue Aleve.     - If limited relief, we may consider vascular work up.     - RTC in 1 month.     The above plan and management options were discussed at length with patient. Patient is in agreement with the above and verbalized understanding.     Viktoriya Camara NP  08/30/2018

## 2018-09-04 ENCOUNTER — HOSPITAL ENCOUNTER (OUTPATIENT)
Dept: RADIOLOGY | Facility: CLINIC | Age: 68
Discharge: HOME | End: 2018-09-04
Attending: INTERNAL MEDICINE
Payer: MEDICARE

## 2018-09-04 ENCOUNTER — OFFICE VISIT (OUTPATIENT)
Dept: GASTROENTEROLOGY | Facility: CLINIC | Age: 68
End: 2018-09-04
Payer: COMMERCIAL

## 2018-09-04 VITALS
HEIGHT: 66 IN | HEART RATE: 78 BPM | BODY MASS INDEX: 25.22 KG/M2 | DIASTOLIC BLOOD PRESSURE: 74 MMHG | WEIGHT: 156.94 LBS | SYSTOLIC BLOOD PRESSURE: 125 MMHG

## 2018-09-04 DIAGNOSIS — M34.9 SCLERODERMA: ICD-10-CM

## 2018-09-04 DIAGNOSIS — R13.19 ESOPHAGEAL DYSPHAGIA: ICD-10-CM

## 2018-09-04 DIAGNOSIS — M89.9 DISORDER OF BONE: ICD-10-CM

## 2018-09-04 DIAGNOSIS — K21.9 GASTROESOPHAGEAL REFLUX DISEASE WITHOUT ESOPHAGITIS: ICD-10-CM

## 2018-09-04 DIAGNOSIS — D50.9 IRON DEFICIENCY ANEMIA, UNSPECIFIED IRON DEFICIENCY ANEMIA TYPE: Primary | ICD-10-CM

## 2018-09-04 DIAGNOSIS — K21.9 GASTROESOPHAGEAL REFLUX DISEASE, ESOPHAGITIS PRESENCE NOT SPECIFIED: ICD-10-CM

## 2018-09-04 PROCEDURE — 77080 DXA BONE DENSITY AXIAL: CPT | Mod: 26,,, | Performed by: INTERNAL MEDICINE

## 2018-09-04 PROCEDURE — 99999 PR PBB SHADOW E&M-EST. PATIENT-LVL IV: CPT | Mod: PBBFAC,,, | Performed by: NURSE PRACTITIONER

## 2018-09-04 PROCEDURE — 99214 OFFICE O/P EST MOD 30 MIN: CPT | Mod: PBBFAC,25 | Performed by: NURSE PRACTITIONER

## 2018-09-04 PROCEDURE — 99214 OFFICE O/P EST MOD 30 MIN: CPT | Mod: S$PBB,,, | Performed by: NURSE PRACTITIONER

## 2018-09-04 PROCEDURE — 77080 DXA BONE DENSITY AXIAL: CPT | Mod: TC

## 2018-09-04 RX ORDER — OMEPRAZOLE 40 MG/1
40 CAPSULE, DELAYED RELEASE ORAL
Qty: 180 CAPSULE | Refills: 3 | Status: SHIPPED | OUTPATIENT
Start: 2018-09-04 | End: 2019-01-17 | Stop reason: SDUPTHER

## 2018-09-04 NOTE — PROGRESS NOTES
[facility] Established Patient Visit    Reason for Visit:  The primary encounter diagnosis was Iron deficiency anemia, unspecified iron deficiency anemia type. Diagnoses of Gastroesophageal reflux disease without esophagitis and Esophageal dysphagia were also pertinent to this visit.    PCP: Aly Rand    HPI:This is a 67 y.o. female here for f/u of anemia and dysphagia. She has a hx of esophageal candida, and GEJ polyps. She is overdue for a recommended 6 month repeat EGD. She has a hx notable for scleroderma, ILD, PHTN.    Anemia- stable labs.  Onset-chronic. Admitted to the hospital from 2/21-2/22/18 with fatigue and drop in hgb/hcrt to 6.4/22.5. She received 2 u of PRBC. GI was consulted and advised out pt EGD/colon for further eval, but pt has not had as previously advised.  SOB-no  CP-no  Increased fatigue- Still with some fatigue  Syncope/near syncope-none  Dizziness/ light headedness-some dizziness since increasing lyrica dose  PICA-was craving ice prior to blood transfusion, none since  GI Bleeding-none. Dark stools s/p PO iron initially, none in a while. Takes PO iron TID 2/2018  Previous studies and /or eval per GI-EGD 2017 with GEJ polyp. EGD/[id number], EGD with polyps. Previous EGD with GAVE    Lab Results   Component Value Date    IRON 12 (L) 02/21/2018    TIBC 521 (H) 02/21/2018    FERRITIN 3 (L) 02/21/2018     Lab Results   Component Value Date    WBC 4.33 06/01/2018    HGB 12.4 06/01/2018    HCT 36.3 (L) 06/01/2018    MCV 96 06/01/2018     06/01/2018     Abdominal pain-denies  Reflux - + hx GERD. Tastes acid 1-2 days weekly.  Takes omeprazole 40 mg once daily 1 hour b/f breakfast x multiple years. She also takes 150 mg Zantac PRN  Dysphagia/odynophagia - +hx dysphagia.  Lately multiple days weekly. Solids get stuck in mid esophagus. Some improvement if she chews thoroughly. No problems with liquids. materials eventually pass after about 30 minutes. Washing with liquids does  not help. Has not had to go to ED for this. No problems initiating swallow. Improvement with dilation in the past. Has systemic sclerosis  Bowel habits - 1 formed BM/day.  2-3 days per week will pass mucus. With fecal leakage once weekly. Wears pad.    GI bleeding - denies hematochezia, hematemesis, melena, BRBPR, black/tarry stools, and coffee ground emesis  NSAID usage - 81 mg ASA just about daily.     ROS:  Constitutional: No fevers, no chills, No unintentional weight loss, + improving fatigue,   ENT: No allergies  CV: No chest pain, no palpitations, +  perif. edema, no sob on exertion  Pulm: No cough, no shortness of breath, + wheezes, no sputum  Ophtho: No vision changes  GI: see HPI; also no nausea, no vomiting, no change in appetite  Derm: No rash  Heme: No lymphadenopathy, No bruising  MSK: + arthritis, no muscle pain, no muscle weakness  : No dysuria, No hematuria  Endo: No hot or cold intolerance  Neuro: No syncope, No seizure,     PMHX:  has a past medical history of Abnormal Pap smear, Acid reflux, Allergy, Arthritis, GERD (gastroesophageal reflux disease), History of migraine headaches, Hypertension, Idiopathic neuropathy (7/20/2012), ILD (interstitial lung disease) (11/6/2013), Iron deficiency anemia (3/18/2014), MRSA carrier, Osteopenia, Pulmonary fibrosis, Pulmonary hypertension, Raynaud's disease, Scleroderma, diffuse, Sjogren's syndrome, and Vitamin D deficiency (11/14/2013).    PSHX:  has a past surgical history that includes Dilation and curettage of uterus; Breast biopsy; Cervical conization w/ laser (1970); Hysterectomy (1990); ESOPHAGOGASTRODUODENOSCOPY (EGD) (N/A, 1/26/2017); EGD (ESOPHAGOGASTRODUODENOSCOPY) (N/A, 4/4/2014); and COLONOSCOPY (N/A, 4/4/2014).    The patient's social and family histories were reviewed by me and updated in the appropriate section of the electronic medical record.    Review of patient's allergies indicates:   Allergen Reactions    Sulfa (sulfonamide  "antibiotics)      Other reaction(s): Rash       Current Outpatient Medications   Medication Sig    ADCIRCA 20 mg Tab TAKE TWO  TABLETS (40MG) BY MOUTH ONCE DAILY. CALL [phone] TO REFILL.    albuterol 90 mcg/actuation inhaler Inhale 2 puffs into the lungs every 4 (four) hours as needed for Wheezing.    celecoxib (CELEBREX) 200 MG capsule TAKE 1 CAPSULE(200 MG) BY MOUTH TWICE DAILY    ferrous sulfate 325 (65 FE) MG EC tablet Take 1 tablet (325 mg total) by mouth 3 (three) times daily.    lidocaine-prilocaine (EMLA) cream     multivitamin capsule Take 1 capsule by mouth once daily.     NIFEdipine (ADALAT CC) 90 MG TbSR TAKE 1 TABLET(90 MG) BY MOUTH EVERY DAY    PATADAY 0.2 % Drop 1 drop once daily.     pravastatin (PRAVACHOL) 20 MG tablet Take 1 tablet (20 mg total) by mouth every evening.    pregabalin (LYRICA) 75 MG capsule Take 1 capsule (75 mg total) by mouth 3 (three) times daily.    PREVIDENT 5000 BOOSTER PLUS 1.1 % Pste     ranitidine (ZANTAC) 150 MG capsule Take 1 capsule (150 mg total) by mouth 2 (two) times daily.    desonide (DESOWEN) 0.05 % cream Apply topically 2 (two) times daily.    ergocalciferol (ERGOCALCIFEROL) 50,000 unit Cap Take 1 capsule (50,000 Units total) by mouth every 7 days.    omeprazole (PRILOSEC) 40 MG capsule Take 1 capsule (40 mg total) by mouth 2 (two) times daily before meals.     No current facility-administered medications for this visit.          Objective Findings:    Vital Signs:  /74   Pulse 78   Ht 5' 6" (1.676 m)   Wt 71.2 kg (156 lb 15.5 oz)   BMI 25.34 kg/m²  Body mass index is 25.34 kg/m².    Physical Exam:   General Appearance: Well appearing in no acute distress  Head:   Normocephalic, without obvious abnormality  Eyes:    No scleral icterus, EOMI  Throat: Lips, mucosa, and tongue normal; teeth and gums normal  Lungs: CTA bilaterally in anterior and posterior fields, no wheezes, no crackles.  Heart:  Regular rate and rhythm, S1, S2 normal, " no murmurs heard  Abdomen: Soft, non tender, non distended with positive bowel sounds in all four quadrants. No hepatosplenomegaly, ascites, or mass  Extremities:    2+ radial pulses, no clubbing, cyanosis. + bilateral periferal edema. Skin changes to fingers c/w hx of scleroderma.  Skin: No rash to exposed areas  Neurologic: A&Ox4      Labs:  Lab Results   Component Value Date    WBC 4.33 06/01/2018    HGB 12.4 06/01/2018    HCT 36.3 (L) 06/01/2018     06/01/2018    CHOL 118 (L) 02/21/2018    TRIG 58 02/21/2018    HDL 54 02/21/2018    ALT 10 06/01/2018    AST 20 06/01/2018     06/01/2018    K 4.1 06/01/2018     06/01/2018    CREATININE 0.7 06/01/2018    BUN 20 06/01/2018    CO2 25 06/01/2018    TSH 1.287 11/25/2015    INR 1.0 06/29/2015       Endoscopy:   1/26/2017 EGD L[facility]i: NL esophagus(-).  irrg z line (-). Nl stomach. Duodenal congestion (mild chr inflamm). Esophageal polyps (hp). Rpt 6 months to check for complete removal.   2014 EGD Dr. Gusman-GEJ polyps. bx-chronic inflammation;hyperplastic change. Repeat in 6 months for surveillance.   2014 colonoscopy Dr. Gusman - 3 mm descending colon polyp. Medium internal hemorrhoids. path- fecal matter, no human tissue present. Repeat in 5 years.  2011 EGD [facility]- HH. White nummular esophageal lesions. irregular z line. Esophageal nodule. Path- mild chronic gastritis. No IM. Esophageal candida.  2011 colonoscopy [facility]- sigmoid tics. Internal hemorrhoids. 4 mm ascending colon polyp. Path-hyperplastic polyp. repeat in 5 years.   2009 EGD [facility]- Schatzki ring, esophogeal nodule, HH, irregular z line. Path-chronic gastritis  2007 colonoscopy [facility]- WNL  2007 EGD [facility]- irregular z line. Path-chronic inflammation. No IM.    Assessment:    1. Iron deficiency anemia, unspecified iron deficiency anemia type    2. Gastroesophageal reflux disease without esophagitis    3. Esophageal dysphagia          Recommendations:  1. ROXANNE- EGD/colon as previously  ordered. VCE if unrevealing. Check ferritin and iron and TIBC  2. Dysphagia- EGD. Manometry if EGD unrevealing. Swallow precautions.  3. GERD- increase omeprazole 40 mg to  BID.     F/u pending results.     Order summary:  Orders Placed This Encounter    Ferritin    Iron and TIBC    omeprazole (PRILOSEC) 40 MG capsule    Case request GI: EGD (ESOPHAGOGASTRODUODENOSCOPY), COLONOSCOPY       Thank you for allowing me to participate in the care of Yamel Pringle, APRN, FNP-C

## 2018-09-05 RX ORDER — ERGOCALCIFEROL 1.25 MG/1
50000 CAPSULE ORAL
Qty: 12 CAPSULE | Refills: 0 | Status: SHIPPED | OUTPATIENT
Start: 2018-09-05 | End: 2018-11-28 | Stop reason: SDUPTHER

## 2018-09-06 ENCOUNTER — LAB VISIT (OUTPATIENT)
Dept: LAB | Facility: HOSPITAL | Age: 68
End: 2018-09-06
Attending: INTERNAL MEDICINE
Payer: MEDICARE

## 2018-09-06 ENCOUNTER — OFFICE VISIT (OUTPATIENT)
Dept: INTERNAL MEDICINE | Facility: CLINIC | Age: 68
End: 2018-09-06
Payer: MEDICARE

## 2018-09-06 VITALS
DIASTOLIC BLOOD PRESSURE: 54 MMHG | HEIGHT: 66 IN | WEIGHT: 156.94 LBS | BODY MASS INDEX: 25.22 KG/M2 | TEMPERATURE: 98 F | SYSTOLIC BLOOD PRESSURE: 113 MMHG | HEART RATE: 82 BPM

## 2018-09-06 DIAGNOSIS — D50.8 OTHER IRON DEFICIENCY ANEMIA: ICD-10-CM

## 2018-09-06 DIAGNOSIS — L03.114 CELLULITIS OF LEFT UPPER EXTREMITY: Primary | ICD-10-CM

## 2018-09-06 DIAGNOSIS — E78.49 OTHER HYPERLIPIDEMIA: ICD-10-CM

## 2018-09-06 DIAGNOSIS — D50.9 IRON DEFICIENCY ANEMIA, UNSPECIFIED IRON DEFICIENCY ANEMIA TYPE: ICD-10-CM

## 2018-09-06 LAB
ALBUMIN SERPL BCP-MCNC: 3.2 G/DL
ALP SERPL-CCNC: 118 U/L
ALT SERPL W/O P-5'-P-CCNC: 11 U/L
ANION GAP SERPL CALC-SCNC: 8 MMOL/L
AST SERPL-CCNC: 18 U/L
BASOPHILS # BLD AUTO: 0.03 K/UL
BASOPHILS NFR BLD: 0.6 %
BILIRUB SERPL-MCNC: 0.4 MG/DL
BUN SERPL-MCNC: 15 MG/DL
CALCIUM SERPL-MCNC: 9.4 MG/DL
CHLORIDE SERPL-SCNC: 108 MMOL/L
CHOLEST SERPL-MCNC: 115 MG/DL
CHOLEST/HDLC SERPL: 2.7 {RATIO}
CO2 SERPL-SCNC: 23 MMOL/L
CREAT SERPL-MCNC: 0.7 MG/DL
DIFFERENTIAL METHOD: ABNORMAL
EOSINOPHIL # BLD AUTO: 0.1 K/UL
EOSINOPHIL NFR BLD: 2.4 %
ERYTHROCYTE [DISTWIDTH] IN BLOOD BY AUTOMATED COUNT: 14 %
EST. GFR  (AFRICAN AMERICAN): >60 ML/MIN/1.73 M^2
EST. GFR  (NON AFRICAN AMERICAN): >60 ML/MIN/1.73 M^2
FERRITIN SERPL-MCNC: 31 NG/ML
GLUCOSE SERPL-MCNC: 88 MG/DL
HCT VFR BLD AUTO: 37.5 %
HDLC SERPL-MCNC: 43 MG/DL
HDLC SERPL: 37.4 %
HGB BLD-MCNC: 13.3 G/DL
IMM GRANULOCYTES # BLD AUTO: 0.01 K/UL
IMM GRANULOCYTES NFR BLD AUTO: 0.2 %
IRON SERPL-MCNC: 45 UG/DL
LDLC SERPL CALC-MCNC: 62.2 MG/DL
LYMPHOCYTES # BLD AUTO: 1.1 K/UL
LYMPHOCYTES NFR BLD: 21.7 %
MCH RBC QN AUTO: 35.3 PG
MCHC RBC AUTO-ENTMCNC: 35.5 G/DL
MCV RBC AUTO: 100 FL
MONOCYTES # BLD AUTO: 0.4 K/UL
MONOCYTES NFR BLD: 7.7 %
NEUTROPHILS # BLD AUTO: 3.4 K/UL
NEUTROPHILS NFR BLD: 67.4 %
NONHDLC SERPL-MCNC: 72 MG/DL
NRBC BLD-RTO: 0 /100 WBC
PLATELET # BLD AUTO: 214 K/UL
PMV BLD AUTO: 12.4 FL
POTASSIUM SERPL-SCNC: 4.1 MMOL/L
PROT SERPL-MCNC: 8.5 G/DL
RBC # BLD AUTO: 3.77 M/UL
SATURATED IRON: 13 %
SODIUM SERPL-SCNC: 139 MMOL/L
TOTAL IRON BINDING CAPACITY: 339 UG/DL
TRANSFERRIN SERPL-MCNC: 229 MG/DL
TRIGL SERPL-MCNC: 49 MG/DL
WBC # BLD AUTO: 5.06 K/UL

## 2018-09-06 PROCEDURE — 85025 COMPLETE CBC W/AUTO DIFF WBC: CPT

## 2018-09-06 PROCEDURE — 87186 SC STD MICRODIL/AGAR DIL: CPT

## 2018-09-06 PROCEDURE — 99999 PR PBB SHADOW E&M-EST. PATIENT-LVL III: CPT | Mod: PBBFAC,,, | Performed by: INTERNAL MEDICINE

## 2018-09-06 PROCEDURE — 87077 CULTURE AEROBIC IDENTIFY: CPT

## 2018-09-06 PROCEDURE — 83540 ASSAY OF IRON: CPT

## 2018-09-06 PROCEDURE — 36415 COLL VENOUS BLD VENIPUNCTURE: CPT | Mod: PO

## 2018-09-06 PROCEDURE — 82728 ASSAY OF FERRITIN: CPT

## 2018-09-06 PROCEDURE — 87070 CULTURE OTHR SPECIMN AEROBIC: CPT

## 2018-09-06 PROCEDURE — 80061 LIPID PANEL: CPT

## 2018-09-06 PROCEDURE — 80053 COMPREHEN METABOLIC PANEL: CPT

## 2018-09-06 PROCEDURE — 99213 OFFICE O/P EST LOW 20 MIN: CPT | Mod: S$PBB,,, | Performed by: INTERNAL MEDICINE

## 2018-09-06 PROCEDURE — 99213 OFFICE O/P EST LOW 20 MIN: CPT | Mod: PBBFAC,PO | Performed by: INTERNAL MEDICINE

## 2018-09-06 RX ORDER — CEPHALEXIN 500 MG/1
500 CAPSULE ORAL EVERY 6 HOURS
Qty: 40 CAPSULE | Refills: 1 | Status: SHIPPED | OUTPATIENT
Start: 2018-09-06 | End: 2019-01-02 | Stop reason: ALTCHOICE

## 2018-09-10 LAB — BACTERIA SPEC AEROBE CULT: NORMAL

## 2018-09-12 NOTE — PROGRESS NOTES
Subjective:       Patient ID: Yamel Shah is a 67 y.o. female.    Chief Complaint: Cyst (left elbow)    HPI     The patient presents with complaints of an enlarging cyst on the left elbow.  Patient states this has been present for approximately 2 weeks.  There has been no discharge from the area; however, she has noted increased tenderness at the site.  She does not recall sustaining any trauma to the elbow or sustaining any skin insect  bites to the area.  Intermittent sharp stinging pain is reported.  She has not experienced any chills or fever.     Review of Systems   Constitutional: Negative for activity change, chills and fever.   Musculoskeletal: Positive for arthralgias and joint swelling.   Skin: Positive for wound.       Objective:      Physical Exam   Constitutional: She is oriented to person, place, and time. She appears well-developed and well-nourished.   Musculoskeletal:   Left elbow exhibits soft tissue swelling on the dorsal aspect.  There is some peeling of the skin but no discharge is present.  Mild erythema of the site is present. Decreased flexion and extension of the joint is noted.    Neurological: She is alert and oriented to person, place, and time. No sensory deficit. She exhibits normal muscle tone.   Skin: Skin is warm and dry.   Nursing note and vitals reviewed.      A small amount of discharge was obtained from the elbow wound site.  This was sent for culture.   Assessment:       1. Cellulitis of left upper extremity        Plan:       Yamel was seen today for  Elbow swelling.  Cephalexin will be ordered.  Warm compresses to the affected area is recommended.  The patient should follow up with her rheumatologist regarding her left elbow swelling.  The patient should return to clinic in 2 weeks for a recheck.     Diagnoses and all orders for this visit:    Cellulitis of left upper extremity  -     CULTURE, AEROBIC  (SPECIFY SOURCE)    Other orders  -     cephALEXin (KEFLEX)  500 MG capsule; Take 1 capsule (500 mg total) by mouth every 6 (six) hours. For infection.

## 2018-09-14 ENCOUNTER — OFFICE VISIT (OUTPATIENT)
Dept: INTERNAL MEDICINE | Facility: CLINIC | Age: 68
End: 2018-09-14
Payer: MEDICARE

## 2018-09-14 ENCOUNTER — HOSPITAL ENCOUNTER (OUTPATIENT)
Dept: RADIOLOGY | Facility: HOSPITAL | Age: 68
Discharge: HOME | End: 2018-09-14
Attending: INTERNAL MEDICINE
Payer: MEDICARE

## 2018-09-14 VITALS
HEIGHT: 66 IN | SYSTOLIC BLOOD PRESSURE: 122 MMHG | DIASTOLIC BLOOD PRESSURE: 70 MMHG | WEIGHT: 158.31 LBS | BODY MASS INDEX: 25.44 KG/M2 | TEMPERATURE: 99 F | RESPIRATION RATE: 17 BRPM | HEART RATE: 78 BPM

## 2018-09-14 DIAGNOSIS — M34.9 SCLERODERMA: ICD-10-CM

## 2018-09-14 DIAGNOSIS — D50.8 OTHER IRON DEFICIENCY ANEMIA: ICD-10-CM

## 2018-09-14 DIAGNOSIS — L03.114 CELLULITIS OF LEFT ELBOW: ICD-10-CM

## 2018-09-14 DIAGNOSIS — L03.114 CELLULITIS OF LEFT ELBOW: Primary | ICD-10-CM

## 2018-09-14 DIAGNOSIS — K21.9 GASTROESOPHAGEAL REFLUX DISEASE, ESOPHAGITIS PRESENCE NOT SPECIFIED: ICD-10-CM

## 2018-09-14 PROCEDURE — 73070 X-RAY EXAM OF ELBOW: CPT | Mod: 26,LT,, | Performed by: RADIOLOGY

## 2018-09-14 PROCEDURE — 99213 OFFICE O/P EST LOW 20 MIN: CPT | Mod: PBBFAC,25,PO | Performed by: INTERNAL MEDICINE

## 2018-09-14 PROCEDURE — 99213 OFFICE O/P EST LOW 20 MIN: CPT | Mod: S$PBB,,, | Performed by: INTERNAL MEDICINE

## 2018-09-14 PROCEDURE — 99999 PR PBB SHADOW E&M-EST. PATIENT-LVL III: CPT | Mod: PBBFAC,,, | Performed by: INTERNAL MEDICINE

## 2018-09-14 PROCEDURE — 73070 X-RAY EXAM OF ELBOW: CPT | Mod: TC,PO,LT

## 2018-09-14 RX ORDER — MUPIROCIN 20 MG/G
OINTMENT TOPICAL 3 TIMES DAILY
Qty: 30 G | Refills: 0 | Status: SHIPPED | OUTPATIENT
Start: 2018-09-14 | End: 2019-03-12

## 2018-09-14 NOTE — PROGRESS NOTES
Subjective:       Patient ID: Yamel Shah is a 67 y.o. female.    Chief Complaint: Follow-up    HPI     The patient presents for follow-up of left elbow cellulitis.  She tolerated Keflex well.  The skin discharge was noted soon after starting antibiotic therapy.  Elbow range of motion is reported to be good.    The [facility] bilateral foot ulcers.    Patient is requiring twice daily omeprazole for management of her reflux.   Review of Systems   Constitutional: Negative for activity change, chills and fever.   Respiratory: Negative for shortness of breath.    Cardiovascular: Negative for chest pain.   Musculoskeletal: Positive for arthralgias.   Skin: Positive for wound.   Neurological: Negative for dizziness and headaches.       Objective:      Physical Exam   Constitutional: She appears well-developed and well-nourished. No distress.   Musculoskeletal:   The left elbow exhibits good range of motion.  Two small areas of crusting of the skin are noted.  No discharge is present.      Foot wounds are dressed and are in orthopedic boots.       A culture obtained of the skin of the left elbow showed Staph aureus which was sensitive to all routine antibiotics.   Assessment:       1. Cellulitis of left elbow    2. Scleroderma    3. Gastroesophageal reflux disease, esophagitis presence not specified    4. Other iron deficiency anemia        Plan:       Yamel was seen today for follow-up. X-ray of the left elbow will be obtained.  Bactroban ointment will be ordered.   Routine follow-up in 4 months is recommended.     Diagnoses and all orders for this visit:    Cellulitis of left elbow  -     X-Ray Elbow 2 Views Left; Future    Scleroderma    Gastroesophageal reflux disease, esophagitis presence not specified    Other iron deficiency anemia    Other orders  -     mupirocin (BACTROBAN) 2 % ointment; Apply topically 3 (three) times daily.

## 2018-10-29 RX ORDER — NIFEDIPINE 90 MG/1
TABLET, FILM COATED, EXTENDED RELEASE ORAL
Qty: 90 TABLET | Refills: 3 | Status: SHIPPED | OUTPATIENT
Start: 2018-10-29 | End: 2019-01-02 | Stop reason: SDUPTHER

## 2018-11-03 ENCOUNTER — HOSPITAL ENCOUNTER (OUTPATIENT)
Dept: RADIOLOGY | Facility: HOSPITAL | Age: 68
Discharge: HOME | End: 2018-11-03
Attending: INTERNAL MEDICINE
Payer: MEDICARE

## 2018-11-03 DIAGNOSIS — Z12.39 BREAST CANCER SCREENING: ICD-10-CM

## 2018-11-03 PROCEDURE — 77067 SCR MAMMO BI INCL CAD: CPT | Mod: TC

## 2018-11-03 PROCEDURE — 77063 BREAST TOMOSYNTHESIS BI: CPT | Mod: 26,,, | Performed by: RADIOLOGY

## 2018-11-03 PROCEDURE — 77063 BREAST TOMOSYNTHESIS BI: CPT | Mod: TC

## 2018-11-03 PROCEDURE — 77067 SCR MAMMO BI INCL CAD: CPT | Mod: 26,,, | Performed by: RADIOLOGY

## 2018-11-15 DIAGNOSIS — M34.9 SCLERODERMA: ICD-10-CM

## 2018-11-15 DIAGNOSIS — G60.9 IDIOPATHIC NEUROPATHY: ICD-10-CM

## 2018-11-15 DIAGNOSIS — M34.89 CUTANEOUS SCLERODERMA: ICD-10-CM

## 2018-11-16 RX ORDER — CELECOXIB 200 MG/1
CAPSULE ORAL
Qty: 180 CAPSULE | Refills: 0 | Status: SHIPPED | OUTPATIENT
Start: 2018-11-16 | End: 2018-12-05 | Stop reason: SDUPTHER

## 2018-11-20 ENCOUNTER — TELEPHONE (OUTPATIENT)
Dept: PAIN MEDICINE | Facility: CLINIC | Age: 68
End: 2018-11-20

## 2018-11-20 DIAGNOSIS — M34.9 SCLERODERMA: ICD-10-CM

## 2018-11-20 DIAGNOSIS — G60.9 IDIOPATHIC NEUROPATHY: Primary | ICD-10-CM

## 2018-11-20 RX ORDER — PREGABALIN 75 MG/1
75 CAPSULE ORAL 3 TIMES DAILY
Qty: 90 CAPSULE | Refills: 2 | Status: SHIPPED | OUTPATIENT
Start: 2018-11-20 | End: 2018-12-20

## 2018-11-20 NOTE — TELEPHONE ENCOUNTER
----- Message from Roshni Solis LPN sent at 11/20/2018 10:43 AM CST -----  Contact: Patient Herself      ----- Message -----  From: Kaylah Hidalgo  Sent: 11/20/2018   9:41 AM  To: Fernando Diana Staff                                                    MEDICATION  REFILL  REQUEST      Please refill the medication(s) listed below. Please call the patient when the prescription(s) is ready for  at this phone number   Patient / ph#  [phone]      Medication #1  pregabalin (LYRICA) 75 MG capsule          Preferred Pharmacy:  [facility] # [id number] - [place] - [address] AT [address]     [phone] (Phone)  [phone] (Fax)

## 2018-11-28 RX ORDER — ERGOCALCIFEROL 1.25 MG/1
CAPSULE ORAL
Qty: 12 CAPSULE | Refills: 1 | Status: SHIPPED | OUTPATIENT
Start: 2018-11-28 | End: 2019-06-25 | Stop reason: SDUPTHER

## 2018-12-05 ENCOUNTER — OFFICE VISIT (OUTPATIENT)
Dept: SPINE | Facility: CLINIC | Age: 68
End: 2018-12-05
Attending: ANESTHESIOLOGY
Payer: MEDICARE

## 2018-12-05 VITALS
BODY MASS INDEX: 25.39 KG/M2 | SYSTOLIC BLOOD PRESSURE: 151 MMHG | WEIGHT: 158 LBS | HEIGHT: 66 IN | DIASTOLIC BLOOD PRESSURE: 70 MMHG | TEMPERATURE: 98 F | HEART RATE: 65 BPM

## 2018-12-05 DIAGNOSIS — M34.89 CUTANEOUS SCLERODERMA: ICD-10-CM

## 2018-12-05 DIAGNOSIS — M34.9 SCLERODERMA: ICD-10-CM

## 2018-12-05 DIAGNOSIS — G60.9 IDIOPATHIC NEUROPATHY: Primary | ICD-10-CM

## 2018-12-05 PROCEDURE — 99214 OFFICE O/P EST MOD 30 MIN: CPT | Mod: S$PBB,,, | Performed by: ANESTHESIOLOGY

## 2018-12-05 PROCEDURE — 99999 PR PBB SHADOW E&M-EST. PATIENT-LVL IV: CPT | Mod: PBBFAC,,, | Performed by: ANESTHESIOLOGY

## 2018-12-05 PROCEDURE — 99214 OFFICE O/P EST MOD 30 MIN: CPT | Mod: PBBFAC | Performed by: ANESTHESIOLOGY

## 2018-12-05 RX ORDER — CELECOXIB 200 MG/1
CAPSULE ORAL
Qty: 180 CAPSULE | Refills: 0 | Status: SHIPPED | OUTPATIENT
Start: 2018-12-05 | End: 2019-01-31

## 2018-12-05 RX ORDER — PREGABALIN 150 MG/1
150 CAPSULE ORAL 3 TIMES DAILY
Qty: 90 CAPSULE | Refills: 6 | Status: SHIPPED | OUTPATIENT
Start: 2018-12-05 | End: 2019-06-13 | Stop reason: SDUPTHER

## 2018-12-05 RX ORDER — ZOSTER VACCINE RECOMBINANT, ADJUVANTED 50 MCG/0.5
KIT INTRAMUSCULAR
Refills: 0 | COMMUNITY
Start: 2018-11-30 | End: 2019-01-16

## 2018-12-05 NOTE — PROGRESS NOTES
Chronic Pain - Follow Up    Referring Physician: No ref. provider found    Chief Complaint:   Chief Complaint   Patient presents with    Follow-up        SUBJECTIVE: Disclaimer: This note has been generated using voice-recognition software. There may be typographical errors that have been missed during proof-reading    Interval History 12/5/18:  Patient returns to clinic today for follow up. She reports that since increasing her medications, she is having significant improvement in pain during the day time. She is currently taking Lyrica 75mg TID and Celebrex 200mg TID. However, she states that the burning  And tingling pain in both her feet increase in the evening around 6 or 7pm. She is unable to sleep during the nights due to the pain. She is still wearing her bilateral boots due to non healing ulcers from her scleroderma.     Interval History 8/30/2018:  The patient returns to clinic today for follow up. She continues to report bilateral foot pain that is burning and tingling in nature. She reports that this pain is worse at night. She is currently taking Lyrica 75 mg BID with limited relief. She did not have any benefit with Mobic. She continues to take Aleve with some benefit. She continues to have nonhealing ulcers. She wears an ACE bandage to her right calf, as well as boots to her bilateral feet. She denies any other health changes. Her pain today is 7/10.     Interval History 7/11/2018:  Since previous encounter she has weaned from gabapentin to 600mg / day and did not notice significant worsening of pain, but was having somnelence from higher doses of the medication in the past.  We are weaning in an attempt to trial Lyrica as an alternative to gabapentin.  Tramadol causes significant sedation, limiting its use.  She has discontinued the use of the tramadol.  Additionally she does have benefit from anti-inflammatory medication, has been using aleve, has not trialed CANTRELL-2 inhibitors in the  past.    Interval history 05/16/2018:      The patient has had x-rays of the lumbar spine which did not reveal any evidence of significant neuroforaminal stenosis with degenerative disc disease.  There is mild facet arthropathy.  She has escalated her gabapentin and is currently taking 2100 mg of gabapentin per day without any noticeable improvement but daytime somnolence.  She continues to have nonhealing ulcers and topical pain cream is helping to a limited degree over her leg in areas where there is no skin disruption.    Initial encounter:    Yamel Shah presents to the clinic for the evaluation of foot pain. The pain started 2 years ago following ulcers and symptoms have been worsening.    Brief history: History of scleroderma    Pain Description:    The pain is located in the both feet in the area of slowly healing chronic foot ulcers which were partly from venous insufficiency and stasis associated with her scleroderma.  In her right lower extremity she describes radicular symptoms in the L4/5 distribution.    At BEST  5/10     At WORST  10/10 on the WORST day.      On average pain is rated as 8/10.     Today the pain is rated as 8/10    The pain is described as burning, sharp, shooting and constant      Symptoms interfere with daily activity, sleeping and work.     Exacerbating factors: Laying, Walking, Night Time, Morning and Getting out of bed/chair.      Mitigating factors medications.     Patient denies night fever/night sweats, urinary incontinence, bowel incontinence, significant weight loss, significant motor weakness and loss of sensations.  Patient denies any suicidal or homicidal ideations    Pain Medications:  Current:  Lyrica 75 mg TID    Tried in Past:  NSAIDs -Aleve, Mobic  TCA -Never  SNRI -Never  Anti-convulsants - Gabapentin, Lyrica  Muscle Relaxants -Never  Opioids-Tramadol    Physical Therapy/Home Exercise: no       report:  Reviewed and consistent with medication use as  prescribed.    Pain Procedures: none    Chiropractor -never  Acupuncture - never  TENS unit -never  Spinal decompression -never  Joint replacement -never    Imaging:   X-ray lumbar spine 6/1/2016:  2 views: Alignment is normal. There is mild DJD. No fracture dislocation bone destruction seen.   Impression      Mild DJD.     Xray lumbar spine 4/2018:  COMPARISON:  June 2016    FINDINGS:  There is slight curvature of the lumbar spine.  The vertebral bodies are normally aligned and normal in height.  Mild disc space narrowing present at L5-S1.  There is mild facet degenerative change in the lower spine.  No significant osteophytic spurring present.  There is no change in alignment with flexion or extension.      Past Medical History:   Diagnosis Date    Abnormal Pap smear     Acid reflux     Allergy     Arthritis     GERD (gastroesophageal reflux disease)     History of migraine headaches     Hypertension     Idiopathic neuropathy 7/20/2012    ILD (interstitial lung disease) 11/6/2013    Iron deficiency anemia 3/18/2014    MRSA carrier     Osteopenia     Pulmonary fibrosis     Pulmonary hypertension     Raynaud's disease     Scleroderma, diffuse     Sjogren's syndrome     Vitamin D deficiency 11/14/2013     Past Surgical History:   Procedure Laterality Date    BREAST BIOPSY      Left, benign    CERVICAL CONIZATION   W/ LASER  1970    COLONOSCOPY N/A 4/4/2014    [facility] (4TH FLR)    DILATION AND CURETTAGE OF UTERUS      EGD (ESOPHAGOGASTRODUODENOSCOPY) N/A 4/4/2014    Performed by [facility] Gus[facility] at [facility] ENDO (4TH FLR)    ESOPHAGOGASTRODUODENOSCOPY (EGD) N/A 1/26/2017    Performed by Annamaria Mendoza MD at [facility] ENDO (4TH FLR)    HYSTERECTOMY  1990    FRANDY (AUB, Fibroids), ovaries remain     Social History     Socioeconomic History    Marital status:      Spouse name: Lewis    Number of children: 4    Years of education: Not on file    Highest  education level: Not on file   Social Needs    Financial resource strain: Not on file    Food insecurity - worry: Not on file    Food insecurity - inability: Not on file    Transportation needs - medical: Not on file    Transportation needs - non-medical: Not on file   Occupational History    Occupation: -retired     Employer: [employer]     Comment: [employer]     Employer: RETIRED   Tobacco Use    Smoking status: Never Smoker    Smokeless tobacco: Never Used   Substance and Sexual Activity    Alcohol use: Yes     Alcohol/week: 0.0 oz     Comment: ocassional glass of wine    Drug use: No    Sexual activity: Yes     Partners: Male   Other Topics Concern    Are you pregnant or think you may be? No    Breast-feeding No   Social History Narrative         Family History   Problem Relation Age of Onset    Breast cancer Mother     Stroke Father     Breast cancer Sister     Breast cancer Maternal Aunt     Melanoma Neg Hx     Colon cancer Neg Hx     Crohn's disease Neg Hx     Stomach cancer Neg Hx     Ulcerative colitis Neg Hx     Rectal cancer Neg Hx     Irritable bowel syndrome Neg Hx     Esophageal cancer Neg Hx     Celiac disease Neg Hx        Review of patient's allergies indicates:   Allergen Reactions    Sulfa (sulfonamide antibiotics)      Other reaction(s): Rash       Current Outpatient Medications   Medication Sig    ADCIRCA 20 mg Tab TAKE TWO  TABLETS (40MG) BY MOUTH ONCE DAILY. CALL [phone] TO REFILL.    celecoxib (CELEBREX) 200 MG capsule TAKE 1 CAPSULE(200 MG) BY MOUTH TWICE DAILY    ergocalciferol (ERGOCALCIFEROL) 50,000 unit Cap TAKE 1 CAPSULE BY MOUTH EVERY 7 DAYS    ferrous sulfate 325 (65 FE) MG EC tablet Take 1 tablet (325 mg total) by mouth 3 (three) times daily.    FLUZONE HIGH-DOSE 2018-19, PF, 180 mcg/0.5 mL vaccine ADM 0.5ML IM UTD    multivitamin capsule Take 1 capsule by mouth once daily.     NIFEdipine (ADALAT CC) 90 MG TbSR TAKE  1 TABLET(90 MG) BY MOUTH EVERY DAY    NIFEdipine (ADALAT CC) 90 MG TbSR TAKE 1 TABLET(90 MG) BY MOUTH EVERY DAY    pravastatin (PRAVACHOL) 20 MG tablet Take 1 tablet (20 mg total) by mouth every evening.    pregabalin (LYRICA) 75 MG capsule Take 1 capsule (75 mg total) by mouth 3 (three) times daily.    PREVIDENT 5000 BOOSTER PLUS 1.1 % Pste     ranitidine (ZANTAC) 150 MG capsule TAKE 1 CAPSULE(150 MG) BY MOUTH TWICE DAILY    SHINGRIX, PF, 50 mcg/0.5 mL injection ADM 0.5ML IM UTD    albuterol 90 mcg/actuation inhaler Inhale 2 puffs into the lungs every 4 (four) hours as needed for Wheezing.    cephALEXin (KEFLEX) 500 MG capsule Take 1 capsule (500 mg total) by mouth every 6 (six) hours. For infection.    desonide (DESOWEN) 0.05 % cream Apply topically 2 (two) times daily.    lidocaine-prilocaine (EMLA) cream     mupirocin (BACTROBAN) 2 % ointment Apply topically 3 (three) times daily.    omeprazole (PRILOSEC) 40 MG capsule Take 1 capsule (40 mg total) by mouth 2 (two) times daily before meals.    PATADAY 0.2 % Drop 1 drop once daily.      No current facility-administered medications for this visit.        REVIEW OF SYSTEMS:    GENERAL:  No weight loss, malaise or fevers.  HEENT:   No recent changes in vision or hearing  NECK:  Negative for lumps,  difficulty with swallowing associated with scleroderma.  RESPIRATORY:  Negative for cough, wheezing or shortness of breath, patient denies any recent URI. Albuterol (history of ILD)  CARDIOVASCULAR:  Negative for chest pain, leg swelling or palpitations.  GI:  Negative for abdominal discomfort, blood in stools or black stools or change in bowel habits. GERD controlled zantac  MUSCULOSKELETAL:  See HPI.  SKIN:   Chronic low healing venous stasis ulcerations bilateral lower extremities   PSYCH:  No mood disorder or recent psychosocial stressors.  Patients sleep is not disturbed secondary to pain.  HEMATOLOGY/LYMPHOLOGY:   History of iron deficiency anemia states  "that she received several units of blood several months ago but has been having improved feeling in her feet since transfusion, takes daily iron supplementation.    ENDO: No history of diabetes or thyroid dysfunction  NEURO:   No history of headaches, syncope, paralysis, seizures or tremors.  All other reviewed and negative other than HPI.    OBJECTIVE:    BP (!) 151/70   Pulse 65   Temp 98.3 °F (36.8 °C)   Ht 5' 6" (1.676 m)   Wt 71.7 kg (158 lb)   BMI 25.50 kg/m²     PHYSICAL EXAMINATION:    GENERAL: Well appearing, in no acute distress, alert and oriented x3.  PSYCH:  Mood and affect appropriate.  SKIN: Tight skin associated with scleroderma. Pictures of foot ulcers seen in wound consult note, dressings not removed in clinic today  HEAD/FACE:  Normocephalic, atraumatic. Cranial nerves grossly intact.  CV: RRR with palpation of the radial artery.  PULM: No evidence of respiratory difficulty, symmetric chest rise.  BACK:  There is mild pain with palpation over the facet joints of the lumbar spine bilaterally. Limited ROM with mild pain on extension. Positive facet loading bilaterally.    EXTREMITIES: contractures over on bilateral hands with ulcerations. RLE with ACE wrap. Boots noted to bilateral feet.   MUSCULOSKELETAL:  There is no pain to palpation over the greater trochanteric bursa bilaterally.  FABERs test is positive.  FADIRs test is negative.   Bilateral lower extremity strength testing limited secondary to pain in the feet.    NEURO: Bilateral lower extremity coordination and muscle stretch reflexes are physiologic and symmetric.  Plantar response are downgoing. No clonus.  No loss of sensation is noted.  GAIT: Antalgic, ambulates without assistance         Lab Results   Component Value Date    WBC 5.06 09/06/2018    HGB 13.3 09/06/2018    HCT 37.5 09/06/2018     (H) 09/06/2018     09/06/2018     BMP  Lab Results   Component Value Date     09/06/2018    K 4.1 09/06/2018     " 09/06/2018    CO2 23 09/06/2018    BUN 15 09/06/2018    CREATININE 0.7 09/06/2018    CALCIUM 9.4 09/06/2018    ANIONGAP 8 09/06/2018    ESTGFRAFRICA >60.0 09/06/2018    EGFRNONAA >60.0 09/06/2018         ASSESSMENT: 68 y.o. year old female with pain, consistent with     Encounter Diagnoses   Name Primary?    Idiopathic neuropathy Yes    Scleroderma     Cutaneous scleroderma        PLAN:     - Will Increase Lyrica to 150mg tabs BID from 75mg tabs TID x1 week. If no additional benefit and no side effects, pt instructed to increase to 150mg TID. A new prescription provided today    - Continue Celebrex 200mg TID if no side effects.      - Will order BMP to monitor BUN/Cr with increased Celebrex dosing. Pt instructed to decrease dose to BID if kidney function impaired.     - If pt experiences improvement with increased lyrica dosing, will reduce Celebrex dose in the future.    - Pt to see vascular surgery for workup in January (once pt retires).    - Recommend pt starting vitamin B12.    - RTC in 2 months.    The above plan and management options were discussed at length with patient. Patient is in agreement with the above and verbalized understanding.     Susanne Smith DO  12/05/2018     I reviewed and edited the  fellow's note, I conducted my own interview and physical examination and agree with the findings.     Adalgisa King  12/05/2018

## 2018-12-08 ENCOUNTER — LAB VISIT (OUTPATIENT)
Dept: LAB | Facility: HOSPITAL | Age: 68
End: 2018-12-08
Attending: ANESTHESIOLOGY
Payer: MEDICARE

## 2018-12-08 DIAGNOSIS — G60.9 IDIOPATHIC NEUROPATHY: ICD-10-CM

## 2018-12-08 DIAGNOSIS — M34.89 CUTANEOUS SCLERODERMA: ICD-10-CM

## 2018-12-08 DIAGNOSIS — M34.9 SCLERODERMA: ICD-10-CM

## 2018-12-08 LAB
ANION GAP SERPL CALC-SCNC: 7 MMOL/L
BUN SERPL-MCNC: 15 MG/DL
CALCIUM SERPL-MCNC: 9.1 MG/DL
CHLORIDE SERPL-SCNC: 107 MMOL/L
CO2 SERPL-SCNC: 25 MMOL/L
CREAT SERPL-MCNC: 0.7 MG/DL
EST. GFR  (AFRICAN AMERICAN): >60 ML/MIN/1.73 M^2
EST. GFR  (NON AFRICAN AMERICAN): >60 ML/MIN/1.73 M^2
GLUCOSE SERPL-MCNC: 90 MG/DL
POTASSIUM SERPL-SCNC: 4.4 MMOL/L
SODIUM SERPL-SCNC: 139 MMOL/L

## 2018-12-08 PROCEDURE — 36415 COLL VENOUS BLD VENIPUNCTURE: CPT | Mod: PO

## 2018-12-08 PROCEDURE — 80048 BASIC METABOLIC PNL TOTAL CA: CPT

## 2018-12-10 DIAGNOSIS — I10 HYPERTENSION, ESSENTIAL: Primary | ICD-10-CM

## 2018-12-11 ENCOUNTER — TELEPHONE (OUTPATIENT)
Dept: PAIN MEDICINE | Facility: CLINIC | Age: 68
End: 2018-12-11

## 2018-12-11 NOTE — TELEPHONE ENCOUNTER
----- Message from Zofia Loredo sent at 12/11/2018 11:25 AM CST -----  Name of Who is Calling: [facility] ([facility])      What is the request in detail: Needs an order for a urinalysis      Can the clinic reply by [facility]:    No       What Number to Call Back if not in [facility]: [phone] or [phone]

## 2018-12-11 NOTE — TELEPHONE ENCOUNTER
"Chief Complaint   Patient presents with     Derm Problem     itching red round dry spots on both arms x 2 month       Initial /74 mmHg  Pulse 80  Temp(Src) 97  F (36.1  C) (Oral)  Ht 5' 7\" (1.702 m)  Wt 141 lb 12.8 oz (64.32 kg)  BMI 22.20 kg/m2  SpO2 98% Estimated body mass index is 22.2 kg/(m^2) as calculated from the following:    Height as of this encounter: 5' 7\" (1.702 m).    Weight as of this encounter: 141 lb 12.8 oz (64.32 kg).  BP completed using cuff size: dolores Clark CMA      " Spoke with lab. This was ordered by PCP.

## 2018-12-26 ENCOUNTER — PES CALL (OUTPATIENT)
Dept: ADMINISTRATIVE | Facility: CLINIC | Age: 68
End: 2018-12-26

## 2019-01-02 ENCOUNTER — OFFICE VISIT (OUTPATIENT)
Dept: INTERNAL MEDICINE | Facility: CLINIC | Age: 69
End: 2019-01-02
Payer: MEDICARE

## 2019-01-02 ENCOUNTER — HOSPITAL ENCOUNTER (OUTPATIENT)
Dept: RADIOLOGY | Facility: HOSPITAL | Age: 69
Discharge: HOME | End: 2019-01-02
Attending: INTERNAL MEDICINE
Payer: MEDICARE

## 2019-01-02 VITALS
SYSTOLIC BLOOD PRESSURE: 146 MMHG | TEMPERATURE: 99 F | WEIGHT: 157.63 LBS | RESPIRATION RATE: 18 BRPM | DIASTOLIC BLOOD PRESSURE: 60 MMHG | HEART RATE: 102 BPM | BODY MASS INDEX: 25.33 KG/M2 | HEIGHT: 66 IN

## 2019-01-02 DIAGNOSIS — J20.8 ACUTE BACTERIAL BRONCHITIS: ICD-10-CM

## 2019-01-02 DIAGNOSIS — B96.89 ACUTE BACTERIAL BRONCHITIS: ICD-10-CM

## 2019-01-02 DIAGNOSIS — J84.9 ILD (INTERSTITIAL LUNG DISEASE): ICD-10-CM

## 2019-01-02 DIAGNOSIS — J01.80 ACUTE NON-RECURRENT SINUSITIS OF OTHER SINUS: Primary | ICD-10-CM

## 2019-01-02 PROCEDURE — 99214 PR OFFICE/OUTPT VISIT, EST, LEVL IV, 30-39 MIN: ICD-10-PCS | Mod: S$PBB,,, | Performed by: INTERNAL MEDICINE

## 2019-01-02 PROCEDURE — 71046 X-RAY EXAM CHEST 2 VIEWS: CPT | Mod: TC,PO

## 2019-01-02 PROCEDURE — 96372 THER/PROPH/DIAG INJ SC/IM: CPT | Mod: PBBFAC,PO

## 2019-01-02 PROCEDURE — 99999 PR PBB SHADOW E&M-EST. PATIENT-LVL III: ICD-10-PCS | Mod: PBBFAC,,, | Performed by: INTERNAL MEDICINE

## 2019-01-02 PROCEDURE — 71046 X-RAY EXAM CHEST 2 VIEWS: CPT | Mod: 26,,, | Performed by: RADIOLOGY

## 2019-01-02 PROCEDURE — 99214 OFFICE O/P EST MOD 30 MIN: CPT | Mod: S$PBB,,, | Performed by: INTERNAL MEDICINE

## 2019-01-02 PROCEDURE — 99213 OFFICE O/P EST LOW 20 MIN: CPT | Mod: PBBFAC,PO,25 | Performed by: INTERNAL MEDICINE

## 2019-01-02 PROCEDURE — 99999 PR PBB SHADOW E&M-EST. PATIENT-LVL III: CPT | Mod: PBBFAC,,, | Performed by: INTERNAL MEDICINE

## 2019-01-02 PROCEDURE — 71046 XR CHEST PA AND LATERAL: ICD-10-PCS | Mod: 26,,, | Performed by: RADIOLOGY

## 2019-01-02 RX ORDER — AZITHROMYCIN 250 MG/1
TABLET, FILM COATED ORAL
Qty: 6 TABLET | Refills: 0 | Status: SHIPPED | OUTPATIENT
Start: 2019-01-02 | End: 2019-01-07

## 2019-01-02 RX ORDER — TRIAMCINOLONE ACETONIDE 40 MG/ML
40 INJECTION, SUSPENSION INTRA-ARTICULAR; INTRAMUSCULAR
Status: COMPLETED | OUTPATIENT
Start: 2019-01-02 | End: 2019-01-02

## 2019-01-02 RX ORDER — CODEINE PHOSPHATE AND GUAIFENESIN 10; 100 MG/5ML; MG/5ML
5 SOLUTION ORAL 3 TIMES DAILY PRN
Qty: 236 ML | Refills: 0 | Status: SHIPPED | OUTPATIENT
Start: 2019-01-02 | End: 2019-01-12

## 2019-01-02 RX ORDER — MULTIVIT WITH MINERALS/HERBS
1 TABLET ORAL DAILY
COMMUNITY
End: 2020-01-17

## 2019-01-02 RX ADMIN — TRIAMCINOLONE ACETONIDE 40 MG: 40 INJECTION, SUSPENSION INTRA-ARTICULAR; INTRAMUSCULAR at 01:01

## 2019-01-11 ENCOUNTER — OFFICE VISIT (OUTPATIENT)
Dept: INTERNAL MEDICINE | Facility: CLINIC | Age: 69
End: 2019-01-11
Payer: MEDICARE

## 2019-01-11 VITALS
DIASTOLIC BLOOD PRESSURE: 60 MMHG | HEART RATE: 67 BPM | HEIGHT: 66 IN | WEIGHT: 155.19 LBS | SYSTOLIC BLOOD PRESSURE: 114 MMHG | BODY MASS INDEX: 24.94 KG/M2 | TEMPERATURE: 98 F

## 2019-01-11 DIAGNOSIS — I10 HYPERTENSION, UNSPECIFIED TYPE: ICD-10-CM

## 2019-01-11 DIAGNOSIS — M34.9 SCLERODERMA: Primary | ICD-10-CM

## 2019-01-11 DIAGNOSIS — K21.9 GASTROESOPHAGEAL REFLUX DISEASE, ESOPHAGITIS PRESENCE NOT SPECIFIED: ICD-10-CM

## 2019-01-11 DIAGNOSIS — E55.9 VITAMIN D DEFICIENCY: ICD-10-CM

## 2019-01-11 DIAGNOSIS — M85.80 OSTEOPENIA, UNSPECIFIED LOCATION: ICD-10-CM

## 2019-01-11 PROCEDURE — 99999 PR PBB SHADOW E&M-EST. PATIENT-LVL III: ICD-10-PCS | Mod: PBBFAC,,, | Performed by: INTERNAL MEDICINE

## 2019-01-11 PROCEDURE — 99214 OFFICE O/P EST MOD 30 MIN: CPT | Mod: S$PBB,,, | Performed by: INTERNAL MEDICINE

## 2019-01-11 PROCEDURE — 99213 OFFICE O/P EST LOW 20 MIN: CPT | Mod: PBBFAC,PO | Performed by: INTERNAL MEDICINE

## 2019-01-11 PROCEDURE — 99214 PR OFFICE/OUTPT VISIT, EST, LEVL IV, 30-39 MIN: ICD-10-PCS | Mod: S$PBB,,, | Performed by: INTERNAL MEDICINE

## 2019-01-11 PROCEDURE — 99999 PR PBB SHADOW E&M-EST. PATIENT-LVL III: CPT | Mod: PBBFAC,,, | Performed by: INTERNAL MEDICINE

## 2019-01-11 RX ORDER — ALBUTEROL SULFATE 90 UG/1
2 AEROSOL, METERED RESPIRATORY (INHALATION) EVERY 4 HOURS PRN
Qty: 18 G | Refills: 1 | Status: SHIPPED | OUTPATIENT
Start: 2019-01-11 | End: 2019-09-06

## 2019-01-11 NOTE — PROGRESS NOTES
Subjective:       Patient ID: Yamel Shah is a 68 y.o. female.    Chief Complaint: Follow-up (4 month)    HPI   The patient presents for follow-up of medical conditions.  She has scleroderma complicated by pulmonary hypertension, Raynaud's disease without gangrene, skin ulcers of both feet, interstitial lung disease, venous insufficiency of both lower extremities, and idiopathic neuropathy.  Today her chief complaint this is an upper respiratory infection.  She has been experiencing laryngitis since 01/01/2019.  She denies having any head congestion although she does experience nocturnal wheezing with the respiratory infection.  She has minimal cough.  She denies having any chills, fever, or night sweats.  She has not experienced any chest pain.  Review of Systems   Constitutional: Positive for unexpected weight change. Negative for activity change.   HENT: Positive for hearing loss and voice change. Negative for rhinorrhea, sore throat and trouble swallowing.    Eyes: Positive for visual disturbance. Negative for discharge.   Respiratory: Positive for wheezing. Negative for chest tightness.    Cardiovascular: Negative for chest pain and palpitations.   Gastrointestinal: Negative for blood in stool, constipation, diarrhea and vomiting.   Endocrine: Negative for polydipsia and polyuria.   Genitourinary: Negative for difficulty urinating, dysuria, hematuria and menstrual problem.   Musculoskeletal: Positive for arthralgias. Negative for joint swelling and neck pain.   Skin: Positive for wound (Bilateral foot ulcers are noted.).   Neurological: Positive for weakness. Negative for headaches.   Psychiatric/Behavioral: Negative for confusion and dysphoric mood.       Objective:      Physical Exam   Constitutional: She is oriented to person, place, and time. She appears well-developed and well-nourished. No distress.   HENT:   Head: Normocephalic and atraumatic.   Right Ear: External ear normal.   Left Ear:  External ear normal.   Mouth/Throat: Oropharynx is clear and moist.   The patient is hoarse.   Eyes: Conjunctivae and EOM are normal. Right eye exhibits no discharge. Left eye exhibits no discharge. No scleral icterus.   Neck: Normal range of motion. Neck supple. No JVD present. No thyromegaly present.   Cardiovascular: Normal rate, regular rhythm and normal heart sounds. Exam reveals no gallop and no friction rub.   No murmur heard.  Pulmonary/Chest: Effort normal. No respiratory distress. She has no wheezes. She has rales (Faint bibasilar crackles are present.).   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    Wound dressings are intact on both feet.   Lymphadenopathy:     She has no cervical adenopathy.   Neurological: She is alert and oriented to person, place, and time.   Skin: Skin is warm and dry. No rash noted.   Small crusted area of the right elbow is noted. Left elbow is clear with no ulcer present.   Psychiatric: She has a normal mood and affect. Her behavior is normal. Thought content normal.   Nursing note and vitals reviewed.      Assessment:       1. Scleroderma    2. Gastroesophageal reflux disease, esophagitis presence not specified    3. Vitamin D deficiency    4. Osteopenia, unspecified location    5. Hypertension, unspecified type        Plan:       Yamel was seen today for follow-up.  Ventolin inhaler will be ordered to use for wheezing as needed.  Current medications will be continued.  Blood tests and a follow-up visit in 4 months will be obtained.    Diagnoses and all orders for this visit:    Scleroderma    Gastroesophageal reflux disease, esophagitis presence not specified    Vitamin D deficiency  -     Vitamin D; Future    Osteopenia, unspecified location    Hypertension, unspecified type  -     CBC auto differential; Future  -     Comprehensive metabolic panel; Future  -     Lipid panel; Future  -     TSH; Future    Other orders  -     albuterol (VENTOLIN HFA) 90  mcg/actuation inhaler; Inhale 2 puffs into the lungs every 4 (four) hours as needed for Wheezing. Rescue

## 2019-01-15 ENCOUNTER — TELEPHONE (OUTPATIENT)
Dept: PAIN MEDICINE | Facility: CLINIC | Age: 69
End: 2019-01-15

## 2019-01-15 PROBLEM — D64.9 SYMPTOMATIC ANEMIA: Status: RESOLVED | Noted: 2018-02-22 | Resolved: 2019-01-15

## 2019-01-15 PROBLEM — D64.9 ANEMIA: Status: RESOLVED | Noted: 2018-02-21 | Resolved: 2019-01-15

## 2019-01-15 NOTE — TELEPHONE ENCOUNTER
Staff contacted the patient to reschedule her 2/6/19 9 am Back and Spine appointment with Dr. Fernando MD due to the provider not being available. At this time the appointment is cancelled. Staff apologize for any inconvenience.     Patient accepted an appointment with Viktoriya Camara Np in Pain-Management for 2/6/19 8:40am. Patient verbalized understanding and expressed thanks.

## 2019-01-16 ENCOUNTER — LAB VISIT (OUTPATIENT)
Dept: LAB | Facility: HOSPITAL | Age: 69
End: 2019-01-16
Attending: NURSE PRACTITIONER
Payer: MEDICARE

## 2019-01-16 ENCOUNTER — OFFICE VISIT (OUTPATIENT)
Dept: INTERNAL MEDICINE | Facility: CLINIC | Age: 69
End: 2019-01-16
Payer: MEDICARE

## 2019-01-16 VITALS
RESPIRATION RATE: 15 BRPM | DIASTOLIC BLOOD PRESSURE: 60 MMHG | WEIGHT: 155 LBS | HEIGHT: 66 IN | HEART RATE: 68 BPM | SYSTOLIC BLOOD PRESSURE: 100 MMHG | BODY MASS INDEX: 24.91 KG/M2

## 2019-01-16 DIAGNOSIS — M34.89 CUTANEOUS SCLERODERMA: ICD-10-CM

## 2019-01-16 DIAGNOSIS — E55.9 VITAMIN D DEFICIENCY: ICD-10-CM

## 2019-01-16 DIAGNOSIS — J84.9 ILD (INTERSTITIAL LUNG DISEASE): ICD-10-CM

## 2019-01-16 DIAGNOSIS — Z00.00 ENCOUNTER FOR PREVENTIVE HEALTH EXAMINATION: Primary | ICD-10-CM

## 2019-01-16 DIAGNOSIS — M85.80 OSTEOPENIA, UNSPECIFIED LOCATION: ICD-10-CM

## 2019-01-16 DIAGNOSIS — I78.1 TELANGIECTASIA: ICD-10-CM

## 2019-01-16 DIAGNOSIS — K21.9 GASTROESOPHAGEAL REFLUX DISEASE, ESOPHAGITIS PRESENCE NOT SPECIFIED: ICD-10-CM

## 2019-01-16 DIAGNOSIS — Z11.59 NEED FOR HEPATITIS C SCREENING TEST: ICD-10-CM

## 2019-01-16 DIAGNOSIS — M34.9 SCLERODERMA: ICD-10-CM

## 2019-01-16 DIAGNOSIS — L97.519 SKIN ULCERS OF BOTH FEET: ICD-10-CM

## 2019-01-16 DIAGNOSIS — R32 URINARY INCONTINENCE, UNSPECIFIED TYPE: ICD-10-CM

## 2019-01-16 DIAGNOSIS — R13.10 DYSPHAGIA, UNSPECIFIED TYPE: ICD-10-CM

## 2019-01-16 DIAGNOSIS — R15.9 INCONTINENCE OF FECES, UNSPECIFIED FECAL INCONTINENCE TYPE: ICD-10-CM

## 2019-01-16 DIAGNOSIS — M24.50 FLEXION CONTRACTURES: ICD-10-CM

## 2019-01-16 DIAGNOSIS — I87.2 VENOUS INSUFFICIENCY OF BOTH LOWER EXTREMITIES: ICD-10-CM

## 2019-01-16 DIAGNOSIS — L97.529 SKIN ULCERS OF BOTH FEET: ICD-10-CM

## 2019-01-16 DIAGNOSIS — I27.29 PULMONARY HYPERTENSION ASSOCIATED WITH SYSTEMIC DISORDER: Chronic | ICD-10-CM

## 2019-01-16 DIAGNOSIS — I73.00 RAYNAUD'S DISEASE WITHOUT GANGRENE: ICD-10-CM

## 2019-01-16 DIAGNOSIS — G60.9 IDIOPATHIC NEUROPATHY: ICD-10-CM

## 2019-01-16 PROCEDURE — 99999 PR PBB SHADOW E&M-EST. PATIENT-LVL V: CPT | Mod: PBBFAC,,, | Performed by: NURSE PRACTITIONER

## 2019-01-16 PROCEDURE — G0439 PR MEDICARE ANNUAL WELLNESS SUBSEQUENT VISIT: ICD-10-PCS | Mod: S$GLB,,, | Performed by: NURSE PRACTITIONER

## 2019-01-16 PROCEDURE — G0439 PPPS, SUBSEQ VISIT: HCPCS | Mod: S$GLB,,, | Performed by: NURSE PRACTITIONER

## 2019-01-16 PROCEDURE — 99999 PR PBB SHADOW E&M-EST. PATIENT-LVL V: ICD-10-PCS | Mod: PBBFAC,,, | Performed by: NURSE PRACTITIONER

## 2019-01-16 PROCEDURE — 36415 COLL VENOUS BLD VENIPUNCTURE: CPT | Mod: PO

## 2019-01-16 PROCEDURE — 86803 HEPATITIS C AB TEST: CPT

## 2019-01-16 PROCEDURE — 99215 OFFICE O/P EST HI 40 MIN: CPT | Mod: PBBFAC,PO | Performed by: NURSE PRACTITIONER

## 2019-01-16 NOTE — PATIENT INSTRUCTIONS
Counseling and Referral of Other Preventative  (Italic type indicates deductible and co-insurance are waived)    Patient Name: Yamel Shah  Today's Date: 1/16/2019    Health Maintenance       Date Due Completion Date    Hepatitis C Screening Ordered   ---    TETANUS VACCINE In future for inuury   ---    Mammogram 11/03/2019   11/3/2018    DEXA SCAN 09/04/2020 9/4/2018    Pneumococcal Vaccine (65+ Low/Medium Risk) (2 of 2 - PPSV23) Already received- Not needed   9/27/2016    Lipid Panel 09/06/2019 9/6/2018    Colonoscopy 04/04/2019 4/4/2014        Orders Placed This Encounter   Procedures    Hepatitis C antibody     The following information is provided to all patients.  This information is to help you find resources for any of the problems found today that may be affecting your health:                Living healthy guide: www.[facility].[place].gov      Understanding Diabetes: www.diabetes.org      Eating healthy: www.cdc.gov/healthyweight      CDC home safety checklist: www.cdc.gov/steadi/patient.html      Agency on Aging: www.goea.[place].gov      Alcoholics anonymous (AA): www.aa.org      Physical Activity: www.damon.nih.gov/mz3mlow      Tobacco use: www.quitwithusla.org

## 2019-01-16 NOTE — Clinical Note
Primary Care Providers:Aly Rand MD, MD (General)Your patient was seen today for a HRA visit. Gap(s) in care (HEDIS gaps) have been identified during this visit that require additional testing and possible follow up.Orders Placed This Encounter    Hepatitis C antibody        Standing Status: Future        Number of Occurrences: 1        Standing Expiration Date: 1/16/2020These orders were placed using [facility] approved protocol and any results will be forwarded to your office for appropriate follow up. I have included a copy of my visit note; please review the note and feel free to contact me with any questions. Thank you for allowing me to participate in the care of your patients.Brandy Mccoy NP

## 2019-01-16 NOTE — PROGRESS NOTES
"Yamel Shah presented for a  Medicare AWV and comprehensive Health Risk Assessment today. The following components were reviewed and updated:    · Medical history  · Family History  · Social history- retired from law firm.  · Allergies and Current Medications  · Health Risk Assessment  · Health Maintenance  · Care Team     ** See Completed Assessments for Annual Wellness Visit within the encounter summary.**       The following assessments were completed:  · Living Situation- states independent w ADLs in lieu of her hand/fingers contractures  · CAGE  · Depression Screening  · Timed Get Up and Go  · Whisper Test  · Cognitive Function Screening  · Nutrition Screening  · ADL Screening  · PAQ Screening    Vitals:    01/16/19 1051   BP: 100/60   Pulse: 68   Resp: 15   Weight: 70.3 kg (155 lb)   Height: 5' 6" (1.676 m)     Body mass index is 25.02 kg/m².  Physical Exam   Constitutional: She is oriented to person, place, and time. She appears well-developed and well-nourished.   HENT:   Head: Normocephalic and atraumatic.   Cardiovascular: Normal rate, regular rhythm and normal heart sounds.   No murmur heard.  Pulmonary/Chest: Effort normal and breath sounds normal.   Abdominal: Soft. Bowel sounds are normal. She exhibits no distension.   Musculoskeletal: She exhibits deformity. She exhibits no edema.   Claw deformities to rick hands- fingers     Neurological: She is alert and oriented to person, place, and time.   Skin: Skin is warm and dry.   Thickened perioral & hand skin texture    Psychiatric: She has a normal mood and affect. Her behavior is normal. Judgment and thought content normal.   Nursing note and vitals reviewed.        Diagnoses and health risks identified today and associated recommendations/orders:    1. Cutaneous scleroderma  Stable- followed by rheumatology    2. Osteopenia, unspecified location  Stable- followed by rheumatology., PCP-last DXA 9/4/18    3. Gastroesophageal reflux disease, " esophagitis presence not specified  Stable- followed by PCP    4. ILD (interstitial lung disease)  Stable- followed by rheumatology    5. Telangiectasia  Stable- followed by rheumatology    6. Venous insufficiency of both lower extremities  Stable- followed by Dr Claudio    7. Idiopathic neuropathy  Stable- followed by  PCP, pain mgt    8. Scleroderma  Stable- followed by rheumatology    9. Vitamin D deficiency  Stable- followed by rheumatology    10. Dysphagia, unspecified type  Stable- followed by rheumatology, PCP, GI    11. Pulmonary hypertension associated with systemic disorder  Stable- followed by transplant    12. Need for hepatitis C screening test  Stable- followed by PCP  - Hepatitis C antibody; Future    13. Encounter for preventive health examination  Assessments completed  Preventative health recommendations reviewed       14. Incontinence of feces, unspecified fecal incontinence type  Stable- followed by PCP    15. Urinary incontinence, unspecified type  Stable- followed by PCP    16. Skin ulcers of both feet  Stable- followed by PCP- wound care    17. 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 Recommendations were developed using the USPSTF age appropriate recommendations. Education, counseling, and referrals were provided as needed. After Visit Summary printed and given to patient which includes a list of additional screenings\tests needed. Does not exercise due to feet pains-followed in wound clinic for foot ulcers.She states she has been performing wound care per self. Calcium food handout given.    Follow-up in about 1 year (around 1/16/2020) for HRA.    Brandy Mccoy NP

## 2019-01-17 ENCOUNTER — OFFICE VISIT (OUTPATIENT)
Dept: GASTROENTEROLOGY | Facility: CLINIC | Age: 69
End: 2019-01-17
Payer: MEDICARE

## 2019-01-17 VITALS
WEIGHT: 156.75 LBS | HEART RATE: 62 BPM | BODY MASS INDEX: 25.19 KG/M2 | DIASTOLIC BLOOD PRESSURE: 65 MMHG | SYSTOLIC BLOOD PRESSURE: 129 MMHG | HEIGHT: 66 IN

## 2019-01-17 DIAGNOSIS — K21.9 GASTROESOPHAGEAL REFLUX DISEASE, ESOPHAGITIS PRESENCE NOT SPECIFIED: Primary | ICD-10-CM

## 2019-01-17 DIAGNOSIS — R13.19 ESOPHAGEAL DYSPHAGIA: ICD-10-CM

## 2019-01-17 DIAGNOSIS — D50.9 IRON DEFICIENCY ANEMIA, UNSPECIFIED IRON DEFICIENCY ANEMIA TYPE: ICD-10-CM

## 2019-01-17 LAB — HCV AB SERPL QL IA: NEGATIVE

## 2019-01-17 PROCEDURE — 99215 OFFICE O/P EST HI 40 MIN: CPT | Mod: PBBFAC | Performed by: NURSE PRACTITIONER

## 2019-01-17 PROCEDURE — 99213 OFFICE O/P EST LOW 20 MIN: CPT | Mod: S$PBB,,, | Performed by: NURSE PRACTITIONER

## 2019-01-17 PROCEDURE — 99999 PR PBB SHADOW E&M-EST. PATIENT-LVL V: CPT | Mod: PBBFAC,,, | Performed by: NURSE PRACTITIONER

## 2019-01-17 PROCEDURE — 99213 PR OFFICE/OUTPT VISIT, EST, LEVL III, 20-29 MIN: ICD-10-PCS | Mod: S$PBB,,, | Performed by: NURSE PRACTITIONER

## 2019-01-17 PROCEDURE — 99999 PR PBB SHADOW E&M-EST. PATIENT-LVL V: ICD-10-PCS | Mod: PBBFAC,,, | Performed by: NURSE PRACTITIONER

## 2019-01-17 RX ORDER — OMEPRAZOLE 40 MG/1
40 CAPSULE, DELAYED RELEASE ORAL
Qty: 180 CAPSULE | Refills: 3 | Status: SHIPPED | OUTPATIENT
Start: 2019-01-17 | End: 2019-10-25 | Stop reason: SDUPTHER

## 2019-01-17 NOTE — PATIENT INSTRUCTIONS
For reflux: take omeprazole 40 mg 30-45 minutes before breakfast and 30-45 mg minutes before dinner and take zantac/ranitadine 150 mg before bedtime.    For difficulty swallowing: continue to practice swallow precautions. Use biotin 3-4 times daily (before meals) to help with dryness.     Proceed with your EGD/colonoscopy as previously advised/ordered.

## 2019-01-17 NOTE — PROGRESS NOTES
[facility] Established Patient Visit    Reason for Visit:  The primary encounter diagnosis was Gastroesophageal reflux disease, esophagitis presence not specified. Diagnoses of Esophageal dysphagia and Iron deficiency anemia, unspecified iron deficiency anemia type were also pertinent to this visit.    PCP: Aly Rand    HPI:This is a 67 y.o. female here for f/u of anemia, gerd,  and dysphagia. She has a hx of esophageal candida, and GEJ polyps. She is overdue for a recommended 6 month repeat EGD for the GEJ polyps. She has a hx notable for scleroderma, ILD, PHTN.    Anemia- improved labs. On PO iron once daily  Onset-chronic. Admitted to the hospital from 2/21-2/22/18 with fatigue and drop in hgb/hcrt to 6.4/22.5. She received 2 u of PRBC. GI was consulted and advised out pt EGD/colon for further eval, but pt has not had as previously advised.  SOB-no  CP-no  Increased fatigue- improving fatigue  Syncope/near syncope-none  Dizziness/ light headedness-none  PICA-was craving ice prior to blood transfusion, none since  GI Bleeding-none. Dark stools s/p PO iron initially, none in a while. Takes PO iron once daily   Previous studies and /or eval per GI-EGD 2017 with GEJ polyp. EGD/[id number], EGD with polyps. Previous EGD with GAVE    Lab Results   Component Value Date    IRON 45 09/06/2018    TIBC 339 09/06/2018    FERRITIN 31 09/06/2018     Lab Results   Component Value Date    WBC 5.06 09/06/2018    HGB 13.3 09/06/2018    HCT 37.5 09/06/2018     (H) 09/06/2018     09/06/2018     Abdominal pain-denies  Reflux - + hx GERD. Tastes acid 2-3 days weekly.  Takes omeprazole 40 mg once daily 1 hour b/f breakfast x multiple years. She also takes 150 mg Zantac HS  Dysphagia/odynophagia - +hx dysphagia.  Improving. Lately occurring multiple days monthly. Solids get stuck in mid esophagus. Some improvement if she chews thoroughly. No problems with liquids. materials eventually pass after about 30  minutes. Washing with liquids does not help. Has not had to go to ED for this. No problems initiating swallow. Improvement with dilation in the past. Has systemic sclerosis. Uses biotin mouth wash sometimes  Bowel habits - 1 formed BM/day.  2-3 days per week will pass mucus. With fecal leakage once weekly. Wears pad in underwear.    GI bleeding - denies hematochezia, hematemesis, melena, BRBPR, black/tarry stools, and coffee ground emesis  NSAID usage - none    ROS:  Constitutional: No fevers, no chills, No unintentional weight loss, + improving fatigue,   ENT: No allergies  CV: No chest pain, no palpitations, +  perif. edema, no sob on exertion  Pulm: No cough, no shortness of breath, + wheezes, no sputum  Ophtho: No vision changes  GI: see HPI; also no nausea, no vomiting, no change in appetite  Derm: No rash  Heme: No lymphadenopathy, No bruising  MSK: + arthritis, no muscle pain, no muscle weakness  : No dysuria, No hematuria  Endo: No hot or cold intolerance  Neuro: No syncope, No seizure,     PMHX:  has a past medical history of Abnormal Pap smear, Acid reflux, Allergy, Arthritis, GERD (gastroesophageal reflux disease), History of migraine headaches, Hypertension, Idiopathic neuropathy (7/20/2012), ILD (interstitial lung disease) (11/6/2013), Iron deficiency anemia (3/18/2014), MRSA carrier, Osteopenia, Pulmonary fibrosis, Pulmonary hypertension, Raynaud's disease, Scleroderma, diffuse, Sjogren's syndrome, and Vitamin D deficiency (11/14/2013).    PSHX:  has a past surgical history that includes Dilation and curettage of uterus; Breast biopsy; Cervical conization w/ laser (1970); and Hysterectomy (1990).    The patient's social and family histories were reviewed by me and updated in the appropriate section of the electronic medical record.    Review of patient's allergies indicates:   Allergen Reactions    Sulfa (sulfonamide antibiotics)      Other reaction(s): Rash       Current Outpatient Medications  "  Medication Sig    ADCIRCA 20 mg Tab TAKE TWO  TABLETS (40MG) BY MOUTH ONCE DAILY. CALL [phone] TO REFILL.    albuterol (VENTOLIN HFA) 90 mcg/actuation inhaler Inhale 2 puffs into the lungs every 4 (four) hours as needed for Wheezing. Rescue    albuterol 90 mcg/actuation inhaler Inhale 2 puffs into the lungs every 4 (four) hours as needed for Wheezing.    b complex vitamins tablet Take 1 tablet by mouth once daily.    celecoxib (CELEBREX) 200 MG capsule TAKE 1 CAPSULE(200 MG) BY MOUTH TWICE DAILY    ergocalciferol (ERGOCALCIFEROL) 50,000 unit Cap TAKE 1 CAPSULE BY MOUTH EVERY 7 DAYS    ferrous sulfate 325 (65 FE) MG EC tablet Take 1 tablet (325 mg total) by mouth 3 (three) times daily.    multivitamin capsule Take 1 capsule by mouth once daily.     NIFEdipine (ADALAT CC) 90 MG TbSR TAKE 1 TABLET(90 MG) BY MOUTH EVERY DAY    pravastatin (PRAVACHOL) 20 MG tablet Take 1 tablet (20 mg total) by mouth every evening.    pregabalin (LYRICA) 150 MG capsule Take 1 capsule (150 mg total) by mouth 3 (three) times daily.    PREVIDENT 5000 BOOSTER PLUS 1.1 % Pste     ranitidine (ZANTAC) 150 MG capsule TAKE 1 CAPSULE(150 MG) BY MOUTH TWICE DAILY    desonide (DESOWEN) 0.05 % cream Apply topically 2 (two) times daily.    lidocaine-prilocaine (EMLA) cream     mupirocin (BACTROBAN) 2 % ointment Apply topically 3 (three) times daily.    omeprazole (PRILOSEC) 40 MG capsule Take 1 capsule (40 mg total) by mouth 2 (two) times daily before meals.    PATADAY 0.2 % Drop 1 drop once daily.      No current facility-administered medications for this visit.          Objective Findings:    Vital Signs:  /65   Pulse 62   Ht 5' 6" (1.676 m)   Wt 71.1 kg (156 lb 12 oz)   BMI 25.30 kg/m²  Body mass index is 25.3 kg/m².    Physical Exam:   General Appearance: Well appearing in no acute distress  Head:   Normocephalic, without obvious abnormality  Eyes:    No scleral icterus, EOMI  Throat: Lips, mucosa, and tongue " normal; teeth and gums normal; narrow oral apeture  Lungs: CTA bilaterally in anterior and posterior fields, no wheezes, no crackles.  Heart:  Regular rate and rhythm, S1, S2 normal, no murmurs heard  Abdomen: Soft, non tender, non distended with positive bowel sounds in all four quadrants. No hepatosplenomegaly, ascites, or mass  Extremities:    2+ radial pulses, no clubbing, cyanosis. + bilateral periferal edema. Skin changes to fingers c/w hx of scleroderma.  Skin: No rash to exposed areas  Neurologic: A&Ox4      Labs:  Lab Results   Component Value Date    WBC 5.06 09/06/2018    HGB 13.3 09/06/2018    HCT 37.5 09/06/2018     09/06/2018    CHOL 115 (L) 09/06/2018    TRIG 49 09/06/2018    HDL 43 09/06/2018    ALT 11 09/06/2018    AST 18 09/06/2018     12/08/2018    K 4.4 12/08/2018     12/08/2018    CREATININE 0.7 12/08/2018    BUN 15 12/08/2018    CO2 25 12/08/2018    TSH 1.287 11/25/2015    INR 1.0 06/29/2015       Endoscopy:   1/26/2017 EGD [facility]: NL esophagus(-).  irrg z line (-). Nl stomach. Duodenal congestion (mild chr inflamm). Esophageal polyps (hp). Rpt 6 months to check for complete removal.   2014 EGD Dr. Gusman-GEJ polyps. bx-chronic inflammation;hyperplastic change. Repeat in 6 months for surveillance.   2014 colonoscopy Dr. Gusman - 3 mm descending colon polyp. Medium internal hemorrhoids. path- fecal matter, no human tissue present. Repeat in 5 years.  2011 EGD Ch- HH. White nummular esophageal lesions. irregular z line. Esophageal nodule. Path- mild chronic gastritis. No IM. Esophageal candida.  2011 colonoscopy Ch- sigmoid tics. Internal hemorrhoids. 4 mm ascending colon polyp. Path-hyperplastic polyp. repeat in 5 years.   2009 EGD Ch- Schatzki ring, esophogeal nodule, HH, irregular z line. Path-chronic gastritis  2007 colonoscopy [facility]- WNL  2007 EGD [facility]- irregular z line. Path-chronic inflammation. No IM.    Assessment:    1. Gastroesophageal reflux disease,  esophagitis presence not specified    2. Esophageal dysphagia    3. Iron deficiency anemia, unspecified iron deficiency anemia type          Recommendations:  1. GERD- increase omeprazole 40 mg to twice daily. Continue zantac 150 mg HS.   2. Esophageal dysphagia- better. Continue swallow precautions. Use biotin 3-4 times daily. Will consider esophageal manometry/esophagram with 13 mm BT if worsens.  3. ROXANNE-EGD/colon as previously advised.     F/u pending results.     Order summary:  Orders Placed This Encounter    omeprazole (PRILOSEC) 40 MG capsule       Thank you for allowing me to participate in the care of Yamel Pringle, APRN, FNP-C

## 2019-01-30 ENCOUNTER — TELEPHONE (OUTPATIENT)
Dept: ENDOSCOPY | Facility: HOSPITAL | Age: 69
End: 2019-01-30

## 2019-01-30 NOTE — TELEPHONE ENCOUNTER
Dr. Mendoza,    Patient was seen in clinic by Jeannette Pringle NP. She placed an order for EGD/Colonoscopy with you. When discussing preps with patient, she stated her last colonoscopy, she did not do a prescription prep. She said she was put on a modified 3 day diet and did enemas. I do not see this information documented in patient's chart. She stated the last time she attempted a colonoscopy prep, it burned and she was unable to keep it down. Please advise on prep you want patient to do for procedures.    Thank you,  Marlene

## 2019-01-30 NOTE — TELEPHONE ENCOUNTER
Spoke with pt and informed her that Jeannette has no knowledge of what prep she took during her previous colonoscopy b/c she did not order it. Jeannette's  Zofran for nausea recommendation was given.

## 2019-01-31 ENCOUNTER — OFFICE VISIT (OUTPATIENT)
Dept: TRANSPLANT | Facility: CLINIC | Age: 69
End: 2019-01-31
Payer: MEDICARE

## 2019-01-31 VITALS
SYSTOLIC BLOOD PRESSURE: 126 MMHG | OXYGEN SATURATION: 82 % | DIASTOLIC BLOOD PRESSURE: 59 MMHG | WEIGHT: 157.63 LBS | BODY MASS INDEX: 25.44 KG/M2 | HEART RATE: 63 BPM

## 2019-01-31 DIAGNOSIS — J84.9 ILD (INTERSTITIAL LUNG DISEASE): Primary | ICD-10-CM

## 2019-01-31 DIAGNOSIS — I73.00 RAYNAUD'S DISEASE WITHOUT GANGRENE: ICD-10-CM

## 2019-01-31 DIAGNOSIS — Z12.11 SPECIAL SCREENING FOR MALIGNANT NEOPLASMS, COLON: Primary | ICD-10-CM

## 2019-01-31 DIAGNOSIS — I27.29 PULMONARY HYPERTENSION ASSOCIATED WITH SYSTEMIC DISORDER: Chronic | ICD-10-CM

## 2019-01-31 DIAGNOSIS — I87.2 VENOUS INSUFFICIENCY OF BOTH LOWER EXTREMITIES: ICD-10-CM

## 2019-01-31 DIAGNOSIS — M34.89 CUTANEOUS SCLERODERMA: ICD-10-CM

## 2019-01-31 PROCEDURE — 99999 PR PBB SHADOW E&M-EST. PATIENT-LVL III: ICD-10-PCS | Mod: PBBFAC,,, | Performed by: INTERNAL MEDICINE

## 2019-01-31 PROCEDURE — 99999 PR PBB SHADOW E&M-EST. PATIENT-LVL III: CPT | Mod: PBBFAC,,, | Performed by: INTERNAL MEDICINE

## 2019-01-31 PROCEDURE — 99213 OFFICE O/P EST LOW 20 MIN: CPT | Mod: PBBFAC | Performed by: INTERNAL MEDICINE

## 2019-01-31 PROCEDURE — 99215 PR OFFICE/OUTPT VISIT, EST, LEVL V, 40-54 MIN: ICD-10-PCS | Mod: S$PBB,,, | Performed by: INTERNAL MEDICINE

## 2019-01-31 PROCEDURE — 99215 OFFICE O/P EST HI 40 MIN: CPT | Mod: S$PBB,,, | Performed by: INTERNAL MEDICINE

## 2019-01-31 RX ORDER — SODIUM, POTASSIUM,MAG SULFATES 17.5-3.13G
1 SOLUTION, RECONSTITUTED, ORAL ORAL ONCE
Qty: 1 BOTTLE | Refills: 0 | Status: SHIPPED | OUTPATIENT
Start: 2019-01-31 | End: 2019-01-31

## 2019-01-31 NOTE — PROGRESS NOTES
Subjective:    Patient ID:  Yamel Shah is a 68 y.o. female who presents for follow-up of Pulmonary Hypertension (Colonoscopy Clearance)      HPI   very pleasant 68 year old woman with scleroderma, diagnosed in 1983, previously followed by Dr Boudreaux for pulmonary hypertension here for PH f/u-  Here for a colonoscopy. She is doing well      She was started on Revatio 2013, but had difficulty with affording it, so she was switched to Adcirca 40mg daily.     Since last visit pt cont to deal with the ulcers on her feet, because of this she cant walk like she used to. Has chronic swelling in her ankles, R>L, has right leg wrapped- thinks the three ulcers contribute to the swelling  Says her breathing has been better and no CP.  No palps, orthopnea, PND    No 6mw today due to the ulcers in her feet    TTE today  Right Ventricle: The right ventricle is normal in size measuring 3.8 cm at the base in the apical right ventricle-focused view. Global right ventricular systolic function appears normal. Tricuspid annular plane systolic excursion (TAPSE) is 2.5 cm.   Tissue Doppler-derived tricuspid annular peak systolic velocity (S prime) is 12.5 cm/s. The estimated PA systolic pressure is 28 mmHg.   left atrial volume index is mildly enlarged, measuring 38.00 cc/m2.     Left Ventricle: The left ventricle is upper limit of normal, with an end-diastolic diameter of 5.0 cm, and an end-systolic diameter of 2.9 cm. LV wall thickness is normal, with the septum measuring 0.8 cm and the posterior wall measuring 0.9 cm across.   Relative wall thickness was normal at 0.36, and the LV mass index was 97.2 g/m2 consistent with normal left ventricular mass. There are no regional wall motion abnormalities. Left ventricular systolic function appears normal. Visually estimated ejection   fraction is 60-65%. The LV Doppler derived stroke volume equals 63.0 ccs.    TTE 8/17    1 - Upper limit of normal left ventricular enlargement.      2 - Normal left ventricular systolic function (EF 60-65%).     3 - No wall motion abnormalities.     4 - Normal left ventricular diastolic function.     5 - Mild left atrial enlargement.     6 - Normal right ventricular systolic function .     7 - Trivial to mild mitral regurgitation.     8 - The estimated PA systolic pressure is 28 mmHg.     TTE   1 - Normal left ventricular systolic function (EF 60-65%).     2 - Mild left atrial enlargement.     3 - Normal right ventricular systolic function .     4 - The estimated PA systolic pressure is 30 mmHg.     PFTs 9/16  Normal airflow, mild restriction, normal DLCO    CT chest 9/15  Stable-appearing chronic interstitial lung disease consistent with patient's history of scleroderma. No acute abnormality is identified.  Given the long-term stability of findings dating back to 2007, follow-up as clinically warranted.    Dependent secretions throughout the esophagus to suggest dysmotility  RHC 6/29/ 15    AOSAT: 97  FICKCI: 3.42  FICKCO: 6.06  PAPRES: 35/12 (21)  PASAT: 73  PVR: 1.98  PWPRES: 12/12 (9)  RAPRES: 11/9 (5)  RVPRES: 31/1, 6    TTE 6/15  1 - Normal left ventricular systolic function (EF 60-65%).   2 - Normal left ventricular diastolic function.   3 - Normal right ventricular systolic function .   4 - Trivial tricuspid regurgitation.      Review of Systems   Constitution: Negative for chills, fever, malaise/fatigue and weight gain.   HENT: Negative.    Eyes: Negative.    Cardiovascular: Negative for chest pain, dyspnea on exertion, leg swelling, near-syncope, orthopnea, palpitations, paroxysmal nocturnal dyspnea and syncope.   Respiratory: Negative for cough and shortness of breath.    Endocrine: Negative.    Skin: Negative.    Musculoskeletal: Negative.         Foot ulcer pain   Gastrointestinal: Negative for bloating, abdominal pain and change in bowel habit.   Neurological: Negative for dizziness and light-headedness.   Psychiatric/Behavioral: Negative for  depression.        Objective:   BP (!) 126/59 (BP Location: Left arm, Patient Position: Sitting, BP Method: Medium (Automatic))   Pulse 63   Wt 71.5 kg (157 lb 10.1 oz)   SpO2 (!) 82%   BMI 25.44 kg/m²       Physical Exam   Constitutional: She is oriented to person, place, and time. She appears well-developed and well-nourished.   HENT:   Head: Normocephalic and atraumatic.   Eyes: Right eye exhibits no discharge. Left eye exhibits no discharge.   Neck: Neck supple. No JVD present. No thyromegaly present.   Cardiovascular: Normal rate and regular rhythm. Exam reveals no gallop and no friction rub.   No murmur heard.       Pulmonary/Chest: Effort normal and breath sounds normal. No respiratory distress. She has no wheezes. She has no rales.   Abdominal: Soft. Bowel sounds are normal. She exhibits no distension. There is no tenderness.   Musculoskeletal: Normal range of motion. She exhibits no edema or tenderness.   Neurological: She is alert and oriented to person, place, and time. No cranial nerve deficit. Coordination normal.   Skin: Skin is warm and dry. No rash noted.   Severe tightening of skin hands   Psychiatric: She has a normal mood and affect. Judgment and thought content normal.         Lab Results   Component Value Date    BNP 75 06/01/2018     12/08/2018    K 4.4 12/08/2018    MG 2.1 10/09/2016     12/08/2018    CO2 25 12/08/2018    BUN 15 12/08/2018    CREATININE 0.7 12/08/2018    GLU 90 12/08/2018    AST 18 09/06/2018    ALT 11 09/06/2018    ALBUMIN 3.2 (L) 09/06/2018    PROT 8.5 (H) 09/06/2018    BILITOT 0.4 09/06/2018    CHOL 115 (L) 09/06/2018    HDL 43 09/06/2018    LDLCALC 62.2 (L) 09/06/2018    TRIG 49 09/06/2018       Magnesium   Date Value Ref Range Status   10/09/2016 2.1 1.6 - 2.6 mg/dL Final       Lab Results   Component Value Date    WBC 5.06 09/06/2018    HGB 13.3 09/06/2018    HCT 37.5 09/06/2018     (H) 09/06/2018     09/06/2018       Lab Results   Component  Value Date    INR 1.0 06/29/2015    INR 1.0 01/21/2013    INR 1.0 09/29/2007       BNP   Date Value Ref Range Status   06/01/2018 75 0 - 99 pg/mL Final     Comment:     Values of less than 100 pg/ml are consistent with non-CHF populations.   06/27/2017 48 0 - 99 pg/mL Final     Comment:     Values of less than 100 pg/ml are consistent with non-CHF populations.   07/22/2016 69 0 - 99 pg/mL Final     Comment:     Values of less than 100 pg/ml are consistent with non-CHF populations.       LD   Date Value Ref Range Status   02/22/2018 161 110 - 260 U/L Final     Comment:     Results are increased in hemolyzed samples.   09/30/2007 139 110 - 260 U/L Final           Assessment:       1. Pulmonary hypertension associated with systemic disorder- normal PAP on RHC  with normal RV function by echo, BNP normal, euvolemic on exam- no 6mw due to foot ulcers   2. Scleroderma    3. Telangiectasia    4. ILD (interstitial lung disease) - PFts also surprisingly good, chest imaging stable        Plan:       Low risk for a colonoscopy    no PH medicine changes today-    From a PH standpoint, based on her echo today and last RHC she appears to be doing very well- PFTs also surprisingly good, chest imaging stable    Keep salt intake to under 2000 mg sodium, fluids to under 2 L (64 oz)    Check weights every morning after getting out of bed and urinating. If weight goes up 3# overnight or 5# in one week she should call us    RTC as planned

## 2019-02-05 DIAGNOSIS — K21.9 GASTROESOPHAGEAL REFLUX DISEASE, ESOPHAGITIS PRESENCE NOT SPECIFIED: ICD-10-CM

## 2019-02-05 DIAGNOSIS — M34.9 SCLERODERMA: ICD-10-CM

## 2019-02-05 DIAGNOSIS — M35.9 PULMONARY ARTERY HYPERTENSION ASSOCIATED WITH CONNECTIVE TISSUE DISEASE: Primary | ICD-10-CM

## 2019-02-05 DIAGNOSIS — I27.21 PULMONARY ARTERY HYPERTENSION ASSOCIATED WITH CONNECTIVE TISSUE DISEASE: Primary | ICD-10-CM

## 2019-02-05 RX ORDER — OMEPRAZOLE 40 MG/1
CAPSULE, DELAYED RELEASE ORAL
Qty: 90 CAPSULE | Refills: 3 | Status: SHIPPED | OUTPATIENT
Start: 2019-02-05 | End: 2019-03-12 | Stop reason: SDUPTHER

## 2019-02-06 ENCOUNTER — OFFICE VISIT (OUTPATIENT)
Dept: PAIN MEDICINE | Facility: CLINIC | Age: 69
End: 2019-02-06
Attending: ANESTHESIOLOGY
Payer: MEDICARE

## 2019-02-06 VITALS
TEMPERATURE: 98 F | BODY MASS INDEX: 25.15 KG/M2 | DIASTOLIC BLOOD PRESSURE: 47 MMHG | HEART RATE: 69 BPM | SYSTOLIC BLOOD PRESSURE: 119 MMHG | HEIGHT: 66 IN | WEIGHT: 156.5 LBS

## 2019-02-06 DIAGNOSIS — G60.9 IDIOPATHIC NEUROPATHY: Primary | ICD-10-CM

## 2019-02-06 DIAGNOSIS — M34.9 SCLERODERMA: ICD-10-CM

## 2019-02-06 DIAGNOSIS — G89.4 CHRONIC PAIN DISORDER: ICD-10-CM

## 2019-02-06 PROCEDURE — 99999 PR PBB SHADOW E&M-EST. PATIENT-LVL III: ICD-10-PCS | Mod: PBBFAC,,, | Performed by: NURSE PRACTITIONER

## 2019-02-06 PROCEDURE — 99213 OFFICE O/P EST LOW 20 MIN: CPT | Mod: PBBFAC | Performed by: NURSE PRACTITIONER

## 2019-02-06 PROCEDURE — 99213 OFFICE O/P EST LOW 20 MIN: CPT | Mod: S$PBB,,, | Performed by: NURSE PRACTITIONER

## 2019-02-06 PROCEDURE — 99213 PR OFFICE/OUTPT VISIT, EST, LEVL III, 20-29 MIN: ICD-10-PCS | Mod: S$PBB,,, | Performed by: NURSE PRACTITIONER

## 2019-02-06 PROCEDURE — 99999 PR PBB SHADOW E&M-EST. PATIENT-LVL III: CPT | Mod: PBBFAC,,, | Performed by: NURSE PRACTITIONER

## 2019-02-06 NOTE — PROGRESS NOTES
Chronic Pain - Follow Up    Referring Physician: No ref. provider found    Chief Complaint:   Chief Complaint   Patient presents with    Follow-up     2 month        SUBJECTIVE: Disclaimer: This note has been generated using voice-recognition software. There may be typographical errors that have been missed during proof-reading    Interval History 2/6/2019:  The patient returns to clinic today for follow up. She reports that her bilateral foot wounds have significantly improved since last visit. She does report some significant improvement in her foot pain. She reports intermittent bilateral foot pain especially with prolonged walking. She describes this pain as heavy and tingling in nature. She is no longer taking Celebrex. She is currently taking Lyrica 150 mg BID with benefit. She does report that the Lyrica is expensive for her. She denies any other health changes. Her pain today is 5/10.    Interval History 12/5/18:  Patient returns to clinic today for follow up. She reports that since increasing her medications, she is having significant improvement in pain during the day time. She is currently taking Lyrica 75mg TID and Celebrex 200mg TID. However, she states that the burning  And tingling pain in both her feet increase in the evening around 6 or 7pm. She is unable to sleep during the nights due to the pain. She is still wearing her bilateral boots due to non healing ulcers from her scleroderma.     Interval History 8/30/2018:  The patient returns to clinic today for follow up. She continues to report bilateral foot pain that is burning and tingling in nature. She reports that this pain is worse at night. She is currently taking Lyrica 75 mg BID with limited relief. She did not have any benefit with Mobic. She continues to take Aleve with some benefit. She continues to have nonhealing ulcers. She wears an ACE bandage to her right calf, as well as boots to her bilateral feet. She denies any other health  changes. Her pain today is 7/10.     Interval History 7/11/2018:  Since previous encounter she has weaned from gabapentin to 600mg / day and did not notice significant worsening of pain, but was having somnelence from higher doses of the medication in the past.  We are weaning in an attempt to trial Lyrica as an alternative to gabapentin.  Tramadol causes significant sedation, limiting its use.  She has discontinued the use of the tramadol.  Additionally she does have benefit from anti-inflammatory medication, has been using aleve, has not trialed CANTRELL-2 inhibitors in the past.    Interval history 05/16/2018:      The patient has had x-rays of the lumbar spine which did not reveal any evidence of significant neuroforaminal stenosis with degenerative disc disease.  There is mild facet arthropathy.  She has escalated her gabapentin and is currently taking 2100 mg of gabapentin per day without any noticeable improvement but daytime somnolence.  She continues to have nonhealing ulcers and topical pain cream is helping to a limited degree over her leg in areas where there is no skin disruption.    Initial encounter:    Yamel Shah presents to the clinic for the evaluation of foot pain. The pain started 2 years ago following ulcers and symptoms have been worsening.    Brief history: History of scleroderma    Pain Description:    The pain is located in the both feet in the area of slowly healing chronic foot ulcers which were partly from venous insufficiency and stasis associated with her scleroderma.  In her right lower extremity she describes radicular symptoms in the L4/5 distribution.    At BEST  5/10     At WORST  10/10 on the WORST day.      On average pain is rated as 8/10.     Today the pain is rated as 8/10    The pain is described as burning, sharp, shooting and constant      Symptoms interfere with daily activity, sleeping and work.     Exacerbating factors: Laying, Walking, Night Time, Morning and  Getting out of bed/chair.      Mitigating factors medications.     Patient denies night fever/night sweats, urinary incontinence, bowel incontinence, significant weight loss, significant motor weakness and loss of sensations.  Patient denies any suicidal or homicidal ideations    Pain Medications:  Current:  Aleve  Lyrica 150 mg BID    Tried in Past:  NSAIDs -Aleve, Mobic, Celebrex  TCA -Never  SNRI -Never  Anti-convulsants - Gabapentin, Lyrica  Muscle Relaxants -Never  Opioids-Tramadol    Physical Therapy/Home Exercise: no       report:  Reviewed and consistent with medication use as prescribed.    Pain Procedures: none    Chiropractor -never  Acupuncture - never  TENS unit -never  Spinal decompression -never  Joint replacement -never    Imaging:   X-ray lumbar spine 6/1/2016:  2 views: Alignment is normal. There is mild DJD. No fracture dislocation bone destruction seen.   Impression      Mild DJD.     Xray lumbar spine 4/2018:  COMPARISON:  June 2016    FINDINGS:  There is slight curvature of the lumbar spine.  The vertebral bodies are normally aligned and normal in height.  Mild disc space narrowing present at L5-S1.  There is mild facet degenerative change in the lower spine.  No significant osteophytic spurring present.  There is no change in alignment with flexion or extension.      Past Medical History:   Diagnosis Date    Abnormal Pap smear     Acid reflux     Allergy     Arthritis     GERD (gastroesophageal reflux disease)     History of migraine headaches     Hypertension     Idiopathic neuropathy 7/20/2012    ILD (interstitial lung disease) 11/6/2013    Iron deficiency anemia 3/18/2014    MRSA carrier     Osteopenia     Pulmonary fibrosis     Pulmonary hypertension     Raynaud's disease     Scleroderma, diffuse     Sjogren's syndrome     Vitamin D deficiency 11/14/2013     Past Surgical History:   Procedure Laterality Date    BREAST BIOPSY      Left, benign    CERVICAL CONIZATION    W/ LASER  1970    COLONOSCOPY N/A 4/4/2014    Performed by [facility]enn[facility] Gusman [facility] at [facility]    DILATION AND CURETTAGE OF UTERUS      EGD (ESOPHAGOGASTRODUODENOSCOPY) N/A 4/4/2014    Performed by [facility] at [facility] ([facility])    ESOPHAGOGASTRODUODENOSCOPY (EGD) N/A 1/26/2017    Performed by Annamaria Mendoza MD at [facility] (4[facility] F[facility])    HYSTERECTOMY  1990    FRANDY (AUB, Fibroids), ovaries remain     Social History     Socioeconomic History    Marital status:      Spouse name: Lewis    Number of children: 4    Years of education: Not on file    Highest education level: Not on file   Social Needs    Financial resource strain: Not on file    Food insecurity - worry: Not on file    Food insecurity - inability: Not on file    Transportation needs - medical: Not on file    Transportation needs - non-medical: Not on file   Occupational History    Occupation: -retired     Employer: [email or web address]     Comment:  district [address]     Employer: RETIRED   Tobacco Use    Smoking status: Never Smoker    Smokeless tobacco: Never Used   Substance and Sexual Activity    Alcohol use: No     Alcohol/week: 0.0 oz     Frequency: Never    Drug use: No    Sexual activity: Yes     Partners: Male   Other Topics Concern    Are you pregnant or think you may be? No    Breast-feeding No   Social History Narrative         Family History   Problem Relation Age of Onset    Breast cancer Mother     Stroke Father     No Known Problems Daughter     No Known Problems Son     No Known Problems Daughter     No Known Problems Son     Breast cancer Sister     Breast cancer Maternal Aunt     Melanoma Neg Hx     Colon cancer Neg Hx     Crohn's disease Neg Hx     Stomach cancer Neg Hx     Ulcerative colitis Neg Hx     Rectal cancer Neg Hx     Irritable bowel syndrome Neg Hx     Esophageal cancer Neg Hx     Celiac disease Neg Hx        Review of patient's allergies indicates:    Allergen Reactions    Sulfa (sulfonamide antibiotics)      Other reaction(s): Rash       Current Outpatient Medications   Medication Sig    ADCIRCA 20 mg Tab TAKE TWO  TABLETS (40MG) BY MOUTH ONCE DAILY. CALL [phone] TO REFILL.    albuterol (VENTOLIN HFA) 90 mcg/actuation inhaler Inhale 2 puffs into the lungs every 4 (four) hours as needed for Wheezing. Rescue    b complex vitamins tablet Take 1 tablet by mouth once daily.    desonide (DESOWEN) 0.05 % cream Apply topically 2 (two) times daily.    ergocalciferol (ERGOCALCIFEROL) 50,000 unit Cap TAKE 1 CAPSULE BY MOUTH EVERY 7 DAYS    ferrous sulfate 325 (65 FE) MG EC tablet Take 1 tablet (325 mg total) by mouth 3 (three) times daily.    lidocaine-prilocaine (EMLA) cream     multivitamin capsule Take 1 capsule by mouth once daily.     mupirocin (BACTROBAN) 2 % ointment Apply topically 3 (three) times daily.    NIFEdipine (ADALAT CC) 90 MG TbSR TAKE 1 TABLET(90 MG) BY MOUTH EVERY DAY    omeprazole (PRILOSEC) 40 MG capsule Take 1 capsule (40 mg total) by mouth 2 (two) times daily before meals.    omeprazole (PRILOSEC) 40 MG capsule TAKE 1 CAPSULE(40 MG) BY MOUTH BEFORE BREAKFAST    pravastatin (PRAVACHOL) 20 MG tablet Take 1 tablet (20 mg total) by mouth every evening.    pregabalin (LYRICA) 150 MG capsule Take 1 capsule (150 mg total) by mouth 3 (three) times daily.    ranitidine (ZANTAC) 150 MG capsule TAKE 1 CAPSULE(150 MG) BY MOUTH TWICE DAILY     No current facility-administered medications for this visit.        REVIEW OF SYSTEMS:    GENERAL:  No weight loss, malaise or fevers.  HEENT:   No recent changes in vision or hearing  NECK:  Negative for lumps,  difficulty with swallowing associated with scleroderma.  RESPIRATORY:  Negative for cough, wheezing or shortness of breath, patient denies any recent URI. Albuterol (history of ILD)  CARDIOVASCULAR:  Negative for chest pain, leg swelling or palpitations.  GI:  Negative for abdominal  "discomfort, blood in stools or black stools or change in bowel habits. GERD controlled zantac  MUSCULOSKELETAL:  See HPI.  SKIN:   Chronic low healing venous stasis ulcerations bilateral lower extremities   PSYCH:  No mood disorder or recent psychosocial stressors.  Patients sleep is not disturbed secondary to pain.  HEMATOLOGY/LYMPHOLOGY:   History of iron deficiency anemia states that she received several units of blood several months ago but has been having improved feeling in her feet since transfusion, takes daily iron supplementation.    ENDO: No history of diabetes or thyroid dysfunction  NEURO:   No history of headaches, syncope, paralysis, seizures or tremors.  All other reviewed and negative other than HPI.    OBJECTIVE:    BP (!) 119/47   Pulse 69   Temp 97.9 °F (36.6 °C)   Ht 5' 6" (1.676 m)   Wt 71 kg (156 lb 8.4 oz)   BMI 25.26 kg/m²     PHYSICAL EXAMINATION:    GENERAL: Well appearing, in no acute distress, alert and oriented x3.  PSYCH:  Mood and affect appropriate.  SKIN: Tight skin associated with scleroderma. Pictures of foot ulcers seen in wound consult note, dressings not removed in clinic today.   HEAD/FACE:  Normocephalic, atraumatic. Cranial nerves grossly intact.  CV: RRR with palpation of the radial artery.  PULM: No evidence of respiratory difficulty, symmetric chest rise.  BACK:  There is mild pain with palpation over the facet joints of the lumbar spine bilaterally. Limited ROM with mild pain on extension. Positive facet loading bilaterally.    EXTREMITIES: Contractures over on bilateral hands with ulcerations. Boots noted to bilateral feet.   MUSCULOSKELETAL:  There is no pain to palpation over the greater trochanteric bursa bilaterally.  FABERs test is positive bilaterally.  FADIRs test is negative.   Bilateral lower extremity strength testing limited secondary to pain in the feet.    NEURO: Bilateral lower extremity coordination and muscle stretch reflexes are physiologic and " symmetric.  Plantar response are downgoing. No clonus.  No loss of sensation is noted.  GAIT: Antalgic, ambulates without assistance         Lab Results   Component Value Date    WBC 5.06 09/06/2018    HGB 13.3 09/06/2018    HCT 37.5 09/06/2018     (H) 09/06/2018     09/06/2018     BMP  Lab Results   Component Value Date     12/08/2018    K 4.4 12/08/2018     12/08/2018    CO2 25 12/08/2018    BUN 15 12/08/2018    CREATININE 0.7 12/08/2018    CALCIUM 9.1 12/08/2018    ANIONGAP 7 (L) 12/08/2018    ESTGFRAFRICA >60.0 12/08/2018    EGFRNONAA >60.0 12/08/2018         ASSESSMENT: 68 y.o. year old female with pain, consistent with     Encounter Diagnoses   Name Primary?    Idiopathic neuropathy Yes    Scleroderma     Chronic pain disorder        PLAN:     - Previous imaging was reviewed and discussed with the patient today.    - Continue Lyrica 150 mg BID. She does not need a refill today.     - If cost continues to be an issue, we may consider changing to Gabapentin.     - Continue to follow up with wound care.     - RTC in 1 month.     The above plan and management options were discussed at length with patient. Patient is in agreement with the above and verbalized understanding.     Viktoriya Camara NP  02/06/2019

## 2019-02-08 ENCOUNTER — IMMUNIZATION (OUTPATIENT)
Dept: PHARMACY | Facility: CLINIC | Age: 69
End: 2019-02-08
Payer: MEDICARE

## 2019-03-12 ENCOUNTER — OFFICE VISIT (OUTPATIENT)
Dept: TRANSPLANT | Facility: CLINIC | Age: 69
End: 2019-03-12
Payer: MEDICARE

## 2019-03-12 ENCOUNTER — HOSPITAL ENCOUNTER (OUTPATIENT)
Dept: PULMONOLOGY | Facility: CLINIC | Age: 69
Discharge: HOME | End: 2019-03-12
Payer: MEDICARE

## 2019-03-12 ENCOUNTER — HOSPITAL ENCOUNTER (OUTPATIENT)
Dept: CARDIOLOGY | Facility: CLINIC | Age: 69
Discharge: HOME | End: 2019-03-12
Attending: INTERNAL MEDICINE
Payer: MEDICARE

## 2019-03-12 ENCOUNTER — OFFICE VISIT (OUTPATIENT)
Dept: RHEUMATOLOGY | Facility: CLINIC | Age: 69
End: 2019-03-12
Payer: MEDICARE

## 2019-03-12 VITALS
HEART RATE: 63 BPM | WEIGHT: 161.81 LBS | HEIGHT: 66 IN | DIASTOLIC BLOOD PRESSURE: 66 MMHG | BODY MASS INDEX: 26.01 KG/M2 | SYSTOLIC BLOOD PRESSURE: 148 MMHG

## 2019-03-12 VITALS
BODY MASS INDEX: 25.07 KG/M2 | DIASTOLIC BLOOD PRESSURE: 67 MMHG | HEART RATE: 74 BPM | HEIGHT: 66 IN | WEIGHT: 156 LBS | SYSTOLIC BLOOD PRESSURE: 167 MMHG

## 2019-03-12 VITALS
DIASTOLIC BLOOD PRESSURE: 67 MMHG | SYSTOLIC BLOOD PRESSURE: 144 MMHG | BODY MASS INDEX: 25.68 KG/M2 | WEIGHT: 163.63 LBS | HEART RATE: 71 BPM | HEIGHT: 67 IN

## 2019-03-12 DIAGNOSIS — I51.89 DIASTOLIC DYSFUNCTION: ICD-10-CM

## 2019-03-12 DIAGNOSIS — J84.9 ILD (INTERSTITIAL LUNG DISEASE): ICD-10-CM

## 2019-03-12 DIAGNOSIS — M34.9 SCLERODERMA: ICD-10-CM

## 2019-03-12 DIAGNOSIS — I10 ESSENTIAL HYPERTENSION: ICD-10-CM

## 2019-03-12 DIAGNOSIS — M35.9 PULMONARY ARTERY HYPERTENSION ASSOCIATED WITH CONNECTIVE TISSUE DISEASE: ICD-10-CM

## 2019-03-12 DIAGNOSIS — M85.89 OSTEOPENIA OF MULTIPLE SITES: ICD-10-CM

## 2019-03-12 DIAGNOSIS — I87.2 VENOUS INSUFFICIENCY OF BOTH LOWER EXTREMITIES: Primary | ICD-10-CM

## 2019-03-12 DIAGNOSIS — I27.21 PULMONARY ARTERY HYPERTENSION ASSOCIATED WITH CONNECTIVE TISSUE DISEASE: ICD-10-CM

## 2019-03-12 DIAGNOSIS — I73.00 RAYNAUD'S DISEASE WITHOUT GANGRENE: ICD-10-CM

## 2019-03-12 DIAGNOSIS — L97.519 SKIN ULCERS OF BOTH FEET: ICD-10-CM

## 2019-03-12 DIAGNOSIS — I27.29 PULMONARY HYPERTENSION ASSOCIATED WITH SYSTEMIC DISORDER: Chronic | ICD-10-CM

## 2019-03-12 DIAGNOSIS — I78.1 TELANGIECTASIA: ICD-10-CM

## 2019-03-12 DIAGNOSIS — L97.529 SKIN ULCERS OF BOTH FEET: ICD-10-CM

## 2019-03-12 DIAGNOSIS — R10.11 RIGHT UPPER QUADRANT ABDOMINAL PAIN: ICD-10-CM

## 2019-03-12 DIAGNOSIS — M34.9 SCLERODERMA: Primary | ICD-10-CM

## 2019-03-12 DIAGNOSIS — I27.29 PULMONARY HYPERTENSION ASSOCIATED WITH SYSTEMIC DISORDER: Primary | Chronic | ICD-10-CM

## 2019-03-12 LAB
ASCENDING AORTA: 3 CM
AV INDEX (PROSTH): 0.66
AV MEAN GRADIENT: 5.4 MMHG
AV PEAK GRADIENT: 10.24 MMHG
AV VALVE AREA: 2.07 CM2
AV VELOCITY RATIO: 0.59
BSA FOR ECHO PROCEDURE: 1.82 M2
CV ECHO LV RWT: 0.31 CM
DOP CALC AO PEAK VEL: 1.6 M/S
DOP CALC AO VTI: 38.11 CM
DOP CALC LVOT AREA: 3.11 CM2
DOP CALC LVOT DIAMETER: 1.99 CM
DOP CALC LVOT PEAK VEL: 0.94 M/S
DOP CALC LVOT STROKE VOLUME: 78.71 CM3
DOP CALCLVOT PEAK VEL VTI: 25.32 CM
E WAVE DECELERATION TIME: 217.17 MSEC
E/A RATIO: 1.18
E/E' RATIO: 14.46
ECHO LV POSTERIOR WALL: 0.8 CM (ref 0.6–1.1)
FRACTIONAL SHORTENING: 34 % (ref 28–44)
INTERVENTRICULAR SEPTUM: 0.9 CM (ref 0.6–1.1)
IVRT: 0.1 MSEC
LA MAJOR: 5.5 CM
LA MINOR: 6 CM
LA WIDTH: 3.38 CM
LEFT ATRIUM SIZE: 3.8 CM
LEFT ATRIUM VOLUME INDEX: 34.8 ML/M2
LEFT ATRIUM VOLUME: 62.66 CM3
LEFT INTERNAL DIMENSION IN SYSTOLE: 3.39 CM (ref 2.1–4)
LEFT VENTRICLE DIASTOLIC VOLUME INDEX: 68.51 ML/M2
LEFT VENTRICLE DIASTOLIC VOLUME: 123.3 ML
LEFT VENTRICLE MASS INDEX: 84.4 G/M2
LEFT VENTRICLE SYSTOLIC VOLUME INDEX: 26.1 ML/M2
LEFT VENTRICLE SYSTOLIC VOLUME: 47.04 ML
LEFT VENTRICULAR INTERNAL DIMENSION IN DIASTOLE: 5.1 CM (ref 3.5–6)
LEFT VENTRICULAR MASS: 151.84 G
LV LATERAL E/E' RATIO: 13.43
LV SEPTAL E/E' RATIO: 15.67
MV PEAK A VEL: 0.8 M/S
MV PEAK E VEL: 0.94 M/S
PISA TR MAX VEL: 3.09 M/S
PRE FEV1 FVC: 79
PRE FEV1: 1.43
PRE FVC: 1.82
PREDICTED FEV1 FVC: 78
PREDICTED FEV1: 2.36
PREDICTED FVC: 3.01
PULM VEIN S/D RATIO: 1.35
PV PEAK D VEL: 0.43 M/S
PV PEAK S VEL: 0.58 M/S
RA MAJOR: 4.53 CM
RA PRESSURE: 3 MMHG
RA WIDTH: 4.09 CM
RIGHT VENTRICULAR END-DIASTOLIC DIMENSION: 3.27 CM
RV TISSUE DOPPLER FREE WALL SYSTOLIC VELOCITY 1 (APICAL 4 CHAMBER VIEW): 13.51 M/S
SINUS: 3.15 CM
STJ: 2.66 CM
TDI LATERAL: 0.07
TDI SEPTAL: 0.06
TDI: 0.07
TR MAX PG: 38.19 MMHG
TRICUSPID ANNULAR PLANE SYSTOLIC EXCURSION: 2.81 CM
TV REST PULMONARY ARTERY PRESSURE: 41 MMHG

## 2019-03-12 PROCEDURE — 94729 PR C02/MEMBANE DIFFUSE CAPACITY: ICD-10-PCS | Mod: 26,S$PBB,, | Performed by: INTERNAL MEDICINE

## 2019-03-12 PROCEDURE — 99214 OFFICE O/P EST MOD 30 MIN: CPT | Mod: S$PBB,GC,, | Performed by: INTERNAL MEDICINE

## 2019-03-12 PROCEDURE — 94727 GAS DIL/WSHOT DETER LNG VOL: CPT | Mod: PBBFAC | Performed by: INTERNAL MEDICINE

## 2019-03-12 PROCEDURE — 94727 GAS DIL/WSHOT DETER LNG VOL: CPT | Mod: 26,S$PBB,, | Performed by: INTERNAL MEDICINE

## 2019-03-12 PROCEDURE — 99999 PR PBB SHADOW E&M-EST. PATIENT-LVL IV: ICD-10-PCS | Mod: PBBFAC,GC,, | Performed by: INTERNAL MEDICINE

## 2019-03-12 PROCEDURE — 93306 TTE W/DOPPLER COMPLETE: CPT | Mod: PBBFAC | Performed by: INTERNAL MEDICINE

## 2019-03-12 PROCEDURE — 94729 DIFFUSING CAPACITY: CPT | Mod: PBBFAC | Performed by: INTERNAL MEDICINE

## 2019-03-12 PROCEDURE — 99213 OFFICE O/P EST LOW 20 MIN: CPT | Mod: PBBFAC,25 | Performed by: INTERNAL MEDICINE

## 2019-03-12 PROCEDURE — 99999 PR PBB SHADOW E&M-EST. PATIENT-LVL IV: CPT | Mod: PBBFAC,GC,, | Performed by: INTERNAL MEDICINE

## 2019-03-12 PROCEDURE — 99214 OFFICE O/P EST MOD 30 MIN: CPT | Mod: PBBFAC,25,27 | Performed by: INTERNAL MEDICINE

## 2019-03-12 PROCEDURE — 94729 DIFFUSING CAPACITY: CPT | Mod: 26,S$PBB,, | Performed by: INTERNAL MEDICINE

## 2019-03-12 PROCEDURE — 99214 PR OFFICE/OUTPT VISIT, EST, LEVL IV, 30-39 MIN: ICD-10-PCS | Mod: S$PBB,,, | Performed by: INTERNAL MEDICINE

## 2019-03-12 PROCEDURE — 94010 BREATHING CAPACITY TEST: ICD-10-PCS | Mod: 26,S$PBB,, | Performed by: INTERNAL MEDICINE

## 2019-03-12 PROCEDURE — 94727 PR PULM FUNCTION TEST BY GAS: ICD-10-PCS | Mod: 26,S$PBB,, | Performed by: INTERNAL MEDICINE

## 2019-03-12 PROCEDURE — 99214 OFFICE O/P EST MOD 30 MIN: CPT | Mod: S$PBB,,, | Performed by: INTERNAL MEDICINE

## 2019-03-12 PROCEDURE — 94010 BREATHING CAPACITY TEST: CPT | Mod: 26,S$PBB,, | Performed by: INTERNAL MEDICINE

## 2019-03-12 PROCEDURE — 93306 TRANSTHORACIC ECHO (TTE) COMPLETE (CUPID ONLY): ICD-10-PCS | Mod: 26,S$PBB,, | Performed by: INTERNAL MEDICINE

## 2019-03-12 PROCEDURE — 94010 BREATHING CAPACITY TEST: CPT | Mod: PBBFAC | Performed by: INTERNAL MEDICINE

## 2019-03-12 PROCEDURE — 99999 PR PBB SHADOW E&M-EST. PATIENT-LVL III: CPT | Mod: PBBFAC,,, | Performed by: INTERNAL MEDICINE

## 2019-03-12 PROCEDURE — 99999 PR PBB SHADOW E&M-EST. PATIENT-LVL III: ICD-10-PCS | Mod: PBBFAC,,, | Performed by: INTERNAL MEDICINE

## 2019-03-12 PROCEDURE — 99214 PR OFFICE/OUTPT VISIT, EST, LEVL IV, 30-39 MIN: ICD-10-PCS | Mod: S$PBB,GC,, | Performed by: INTERNAL MEDICINE

## 2019-03-12 RX ORDER — SPIRONOLACTONE 25 MG/1
25 TABLET ORAL DAILY
Qty: 30 TABLET | Refills: 11 | Status: SHIPPED | OUTPATIENT
Start: 2019-03-12 | End: 2020-03-13

## 2019-03-12 ASSESSMENT — ROUTINE ASSESSMENT OF PATIENT INDEX DATA (RAPID3)
MDHAQ FUNCTION SCORE: 1.2
PSYCHOLOGICAL DISTRESS SCORE: 3.3
PATIENT GLOBAL ASSESSMENT SCORE: 5
TOTAL RAPID3 SCORE: 5
FATIGUE SCORE: 5
PAIN SCORE: 6
AM STIFFNESS SCORE: 0, NO

## 2019-03-12 NOTE — PROGRESS NOTES
Subjective:       Patient ID: Yamel Shah is a 68 y.o. female.    Chief Complaint: Disease Management    HPI   67YF with Systemic scleroderma, diagnosed - She has been seeing Dr. Ahuja for over 10 years.( +SSA, +SCL-70, -RNAP3, ILD). She has stable ILD on imaging 2015 and had mild exercise induced pulmonary hypertension in  that has resolved on 2015 RHC and most recent ECHO done 3/2019 shows normal EF with grade 2 diastolic dysfunction, increased sPAP 41 compared to 28mmHg in 2018. PFTs 2016 showed normal DLCO, RV, FEV1 with mild restriction. Repeat PFTs 2018 show normal DLco, decreased RV (89-->64), SVC remains the same at 70 and FVC 70. Planned to obtain CT chest and repeat lung volumes on last visit, however was not done. Pt reports that pain meds were adjusted with Lyrica was increased to 150mg TID which is helping with her symptoms although only taking Lyrica 150mg daily. He recommended Vitamin B supplements which she started taking and noticed that the ulcers are starting to heal faster. ( 3 out of 7 ulcers have healed).     Pt c/o several months history of RUQ abd pain worse with bending, better with rest, no association with food, no nausea/vomiting or changes in bowel habits.    Seen by GI 2019 with plans for EGD/C-scope 19 for further workup of ROXANNE. Pt reports that she was doing well with breathing until this AM where she felt more SOB and NEW. Recently came back from [place] for a  and thinks it might be related to the weather changes.      Pt was referred to vascular surgery on last visit for EVLT of ulcers however cancelled appointment. She had seen Vascular surgery in 2018 who will consider EVLT once ulcers have healed.  Pt is currently on nifedipine 90mg, pravastatin 20mg and adcirca 40mg    Pt denies fever, chills, CP, changes in bowel/bladder habits, new digital ulcers on fingertips, nausea/vomiting, hair loss, joint pain/swelling.     Recently retired in  "Dec 2018, worked as a .       Clinic visit 04/2018  pt was weaned off gabapentin and placed on tramadol by pain management which has been discontinued due to intolerance and is now on Lyrica. Seen by Dr Lim with repeat 2D ECHO with normal EF ,sPAP 28. "From a PH standpoint, based on her echo today and last RHC she appears to be doing very well- PFTs also surprisingly good, chest imaging stable"  Pt's main compliant is the ulcers on her feet. Pt reports compliance with adcirca 40 mg and nifedipine 90mg daily.    Previous hx  Pt was seen in clinic on 2/22/18 and found to have a significant ROXANNE, hb 5 which required hospital admission and 2 units of pRBCs. Her anemia has resolved, she is currently on iron 325 tid. Pt will undergo gi w/u with EGD given anemia and dysphagia. 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   In the past pt's digital ulcerations required admission and treatment with epoprostenol (6/2015 and Sept/2016). She does not feel epo helped with the wounds. She is on adcirca 40 mg  and nifedipine 90mg daily. She did not qualify for Bosentan in the past.       Review of Systems   Constitutional: Negative for chills and fever.   HENT: Negative for dental problem, mouth sores and trouble swallowing.    Eyes: Negative for visual disturbance.   Respiratory: Negative for chest tightness and shortness of breath.    Cardiovascular: Negative for chest pain and leg swelling.   Gastrointestinal: Negative for abdominal pain, blood in stool, diarrhea, nausea and vomiting.   Endocrine: Negative for polyuria.   Genitourinary: Negative for difficulty urinating, dysuria, hematuria and urgency.   Musculoskeletal: Negative for arthralgias, joint swelling and neck pain.   Skin: Positive for color change and wound. Negative for rash.   Neurological: Negative for dizziness, weakness and numbness.   Psychiatric/Behavioral: Negative for decreased concentration and sleep disturbance.         Objective:   BP (!) 144/67  " " Pulse 71   Ht 5' 7.2" (1.707 m)   Wt 74.2 kg (163 lb 9.6 oz)   BMI 25.47 kg/m²      Physical Exam   Constitutional: She is oriented to person, place, and time and well-developed, well-nourished, and in no distress.   HENT:   Head: Normocephalic and atraumatic.   Eyes: EOM are normal. Pupils are equal, round, and reactive to light.   Neck: Normal range of motion. Neck supple.   Cardiovascular: Normal rate and regular rhythm.    Pulmonary/Chest: Effort normal.   Bibasilar crackles    Abdominal: Soft. Bowel sounds are normal.   Neurological: She is alert and oriented to person, place, and time.   Skin: Skin is warm and dry.     Feet wrapped in dressing   Psychiatric: Affect normal.   Musculoskeletal: Normal range of motion. She exhibits deformity. She exhibits no edema or tenderness.   mRSS 31  Telangiectasias on the face and upper chest wall  Skin tightening b/l  Claw hand deformity. Flexion contractures b/l.                  Results for DANA GARCÍA (MRN [id number]) as of 3/12/2019 09:54   Ref. Range 6/1/2018 09:18 9/6/2018 09:52   WBC Latest Ref Range: 3.90 - 12.70 K/uL 4.33 5.06   RBC Latest Ref Range: 4.00 - 5.40 M/uL 3.78 (L) 3.77 (L)   Hemoglobin Latest Ref Range: 12.0 - 16.0 g/dL 12.4 13.3   Hematocrit Latest Ref Range: 37.0 - 48.5 % 36.3 (L) 37.5   MCV Latest Ref Range: 82 - 98 fL 96 100 (H)   MCH Latest Ref Range: 27.0 - 31.0 pg 32.8 (H) 35.3 (H)   MCHC Latest Ref Range: 32.0 - 36.0 g/dL 34.2 35.5   RDW Latest Ref Range: 11.5 - 14.5 % 14.5 14.0   Platelets Latest Ref Range: 150 - 350 K/uL 176 214   MPV Latest Ref Range: 9.2 - 12.9 fL 10.9 12.4   Gran% Latest Ref Range: 38.0 - 73.0 % 55.0 67.4   Gran # (ANC) Latest Ref Range: 1.8 - 7.7 K/uL 2.4 3.4   Immature Granulocytes Latest Ref Range: 0.0 - 0.5 % 0.2 0.2   Immature Grans (Abs) Latest Ref Range: 0.00 - 0.04 K/uL 0.01 0.01   Lymph% Latest Ref Range: 18.0 - 48.0 % 32.8 21.7   Lymph # Latest Ref Range: 1.0 - 4.8 K/uL 1.4 1.1   Mono% Latest Ref " Range: 4.0 - 15.0 % 8.5 7.7   Mono # Latest Ref Range: 0.3 - 1.0 K/uL 0.4 0.4   Eosinophil% Latest Ref Range: 0.0 - 8.0 % 2.8 2.4   Eos # Latest Ref Range: 0.0 - 0.5 K/uL 0.1 0.1   Basophil% Latest Ref Range: 0.0 - 1.9 % 0.7 0.6   Baso # Latest Ref Range: 0.00 - 0.20 K/uL 0.03 0.03   nRBC Latest Ref Range: 0 /100 WBC 0 0     Results for DANA GARCÍA (MRN [id number]) as of 3/12/2019 09:54   Ref. Range 9/6/2018 09:52 12/8/2018 10:53   Sodium Latest Ref Range: 136 - 145 mmol/L 139 139   Potassium Latest Ref Range: 3.5 - 5.1 mmol/L 4.1 4.4   Chloride Latest Ref Range: 95 - 110 mmol/L 108 107   CO2 Latest Ref Range: 23 - 29 mmol/L 23 25   Anion Gap Latest Ref Range: 8 - 16 mmol/L 8 7 (L)   BUN, Bld Latest Ref Range: 8 - 23 mg/dL 15 15   Creatinine Latest Ref Range: 0.5 - 1.4 mg/dL 0.7 0.7   eGFR if non African American Latest Ref Range: >60 mL/min/1.73 m^2 >60.0 >60.0   eGFR if African American Latest Ref Range: >60 mL/min/1.73 m^2 >60.0 >60.0   Glucose Latest Ref Range: 70 - 110 mg/dL 88 90   Calcium Latest Ref Range: 8.7 - 10.5 mg/dL 9.4 9.1   Alkaline Phosphatase Latest Ref Range: 55 - 135 U/L 118    Total Protein Latest Ref Range: 6.0 - 8.4 g/dL 8.5 (H)    Albumin Latest Ref Range: 3.5 - 5.2 g/dL 3.2 (L)    Total Bilirubin Latest Ref Range: 0.1 - 1.0 mg/dL 0.4    AST Latest Ref Range: 10 - 40 U/L 18    ALT Latest Ref Range: 10 - 44 U/L 11        Results for DANA GARCÍA (MRN [id number]) as of 7/24/2018 13:08   Ref. Range 11/10/2009 16:40 1/7/2015 12:33   Anti-SSA Antibody Latest Units: .22 (A)    Anti-SSA Interpretation Unknown Positive (A)    Anti-SSB Antibody Latest Units: EU 16.46    Anti-SSB Interpretation Unknown Negative    ds DNA Ab Latest Ref Range: Negative Titer Negative    Scleroderma SCL- Latest Ref Range: <20 UNITS  133 (H)   Complement (C-3) Latest Ref Range: 50 - 180 mg/dl 87    Complement (C-4) Latest Ref Range: 11 - 44 mg/dl 10 (L)    Complement,Total, Serum Latest Ref Range: 30 -  75 U/mL 43       Results for DANA GARCÍA (MRN [id number]) as of 7/24/2018 13:08   Ref. Range 1/13/2017 08:20   IgG - Serum Latest Ref Range: 650 - 1600 mg/dL 2625 (H)   IgM Latest Ref Range: 50 - 300 mg/dL 121   IgA Latest Ref Range: 40 - 350 mg/dL 487 (H)     Results for DANA GARCÍA (MRN [id number]) as of 3/12/2019 14:06   Ref. Range 2/21/2018 11:12 2/21/2018 11:12 3/12/2019 11:04   Sed Rate Latest Ref Range: 0 - 36 mm/Hr 61 (H) 61 (H) 46 (H)     Results for DANA GARCÍA (MRN [id number]) as of 3/12/2019 14:06   Ref. Range 2/21/2018 11:12 2/21/2018 11:12 3/12/2019 11:04   CRP Latest Ref Range: 0.0 - 8.2 mg/L 4.1 4.1 34.0 (H)     SPEP 1/2017  Electronically reviewed and signed by:   Camila Soliman M.D.   Signed on 01/16/17 at 15:54   Increased total protein. Increased gamma globulin, polyclonal.     EGD 01/2017  Normal esophagus. Two biopsies were obtained in                         the upper third of the esophagus.                        - Z-line irregular. Biopsied.                        - Esophageal polyp(s) were found. Resected and                         retrieved.                        - Normal stomach.                        - Congested duodenal mucosa. Biopsied.  Recommendation:       - Await pathology results.                        - Discharge patient to home (ambulatory).                        - Resume previous diet.                        - Continue present medications.                        - Repeat the upper endoscopy in 6 months to assure                         complete removal of polyp at GE junction.      DEXA 09/2018  Osteopenia. There is a 11.9% risk of a major osteoporotic fracture and a 2.6% risk of hip fracture in the next 10 years (FRAX).  Compared with previous DXA, BMD at the lumbar spine has remained stable, and the BMD at the total hip has decreased by -7.4%    2D ECHO 03/2019  · Normal left ventricular systolic function. The estimated ejection fraction is  60%  · Grade II (moderate) left ventricular diastolic dysfunction consistent with pseudonormalization.  · Elevated left atrial pressure.  · No wall motion abnormalities.  · Normal right ventricular systolic function.  · Mild left atrial enlargement.  · Mild mitral regurgitation.  · The estimated PA systolic pressure is 41 mm Hg  · Normal central venous pressure (3 mm Hg).      2D ECHO 06/2018  CONCLUSIONS     1 - Upper limit of normal left ventricular enlargement.     2 - Normal left ventricular systolic function (EF 60-65%).     3 - No wall motion abnormalities.     4 - Normal left ventricular diastolic function.     5 - Biatrial enlargement.     6 - Normal right ventricular systolic function .     7 - Trivial mitral regurgitation.     8 - Mild tricuspid regurgitation.     9 - Trivial pulmonic regurgitation.     10 - The estimated PA systolic pressure is 28 mmHg.     CT chest 09/2015  Findings:  Examination of the vascular and soft tissue structures at the base of the neck is unremarkable.  The thoracic aorta maintains normal caliber, contour, and course without significant atherosclerotic calcification within its course.  The heart is not enlarged and there is no evidence of pericardial effusion. The esophagus is mildly dilated and   retained pneumatized secretions are identified within the dependent portion of the esophagus placing the patient at risk for aspiration and also suggestive esophageal dysmotility associated with scleroderma or reflux. There is no axillary, mediastinal,   or hilar lymphadenopathy.    A benign appearing but enlarged left axillary lymph node is identified, which appears stable to the prior examination dated 11/27/07.  Scattered normal sized and benign appearing mediastinal lymphadenopathy is also appreciated, also grossly similar to   the prior examination.     The lungs again are symmetrically expanded and demonstrate chronic generalized lung disease with small opacities and  reticulations in a peripheral predominance.  This again is most notable in the lung bases were there is dilatation of small airways and faint groundglass opacity.  Findings appear grossly stable to the prior chest CT in 2007 and are consistent with interstitial lung disease as can be seen with scleroderma, pulmonary fibrosis or interstitial pneumonitis.  No focal mass lesion is identified.Limited views of the abdomen demonstrate nothing unusual on this noncontrast examination.  The osseous structures demonstrate mild degenerative changes and a stable appearing compression deformity of the T9 vertebral body.    PFTs       FVC     SVC      RV     DLco    3/6/18      64         70         64      112  4/8/16      70         70         89       84  9/4/15      66         62         72       71  1/19/15    66           2/14/14    64                                 87  Assessment:       1. Scleroderma    2. Right upper quadrant abdominal pain    3. ILD (interstitial lung disease)    4. Pulmonary hypertension associated with systemic disorder    5. Raynaud's disease without gangrene    6. Osteopenia of multiple sites    7. Telangiectasia    8. Skin ulcers of both feet            Plan:         Diagnoses and all orders for this visit:    Scleroderma ( +scl-70, neg RNAP3, Raynauds, ILD, GERD, sclerodactyly with claw hand + telangiectasias, exercise induced pHTN (resolved 6/2015 RHC) )  mRSS increased 25-->31 since last visit. Lung volumes DLco wnl however decrease in RV, will reschedule CT chest for further evaluation of ILD  Most recent labs show anemia improved with normal WBC and platelet wnl. Metabolic profile with normal renal and liver function.   Continue Nifedipine and adcirca  Continue statin  Repeat lung volumes   May consider therapy with Cellcept pending repeat CT chest and lung volumes.   -     Sedimentation rate; Standing  -     CBC auto differential; Standing  -     C-reactive protein; Standing  -      Comprehensive metabolic panel; Standing  -     CT Chest Without Contrast; Future  -     Spriometry w/Tracings; Future  -     DLCO; Future  -     Lung Volume; Future    RUQ abd pain  Unsure etiology. Obtain CT abd for further evaluation    Telangiectasia    ILD (interstitial lung disease)  -     CT Chest Without Contrast; Future      Pulmonary hypertension associated with systemic disorder  Exercise induced Pulmonary hypertension RHC 1/13- resolved on RHC 6/2015.  Stable. Doesn't qualify for Bosentan. She is on Adcirca 40mg daily  2D ECHO done 3/2019 shows increased sPAP 28-->41mmg with diastolic dysfunction and normal EF.  Keep appt with Dr Lim      Osteopenia, unspecified location  DEXA 09/2018  FRAX 11.9% MOF, HF 2.6%  Continue Vit D and Ca supplements    Skin ulcers of both feet  Re-refer to Vascular for further evaluation for consideration of EVLT  Continue Lyrica 150mg, pt is currently taking once daily  Continue Vitamin B supplements      Gastroesophageal reflux disease, esophagitis presence not specified  Continue prilosec and rantidine  Scheduled for EGD/C-scope this month        UTD with immunizations

## 2019-03-12 NOTE — PROGRESS NOTES
Subjective:    Patient ID:  Yamel Shah is a 68 y.o. female who presents for follow-up of Pulmonary Hypertension      HPI   very pleasant 68 year old woman with scleroderma, diagnosed in 1983, previously followed by Dr Boudreaux for pulmonary hypertension here for PH f/u-        She was started on Revatio 2013, but had difficulty with affording it, so she was switched to Adcirca 40mg daily.     Since last visit pt cont to deal with the ulcers on her feet, both feet still in boots today. Says the ulcers are healing. Saw Dr Ahuja today and he ordered PFTs today. Thinks the swelling in her feet is less this visit than last.     Says her breathing has been better and no CP.  No palps, orthopnea, PND    No 6mw today due to the ulcers in her feet    TTE today  · Normal left ventricular systolic function. The estimated ejection fraction is 60%  · Grade II (moderate) left ventricular diastolic dysfunction consistent with pseudonormalization.  · Elevated left atrial pressure.  · No wall motion abnormalities.  · Normal right ventricular systolic function.  · Mild left atrial enlargement.  · Mild mitral regurgitation.  · The estimated PA systolic pressure is 41 mm Hg  · Normal central venous pressure (3 mm Hg).   Normal cavity size, wall thickness and ejection fraction. Wall motion normal. RV 3.3  TAPSE 2.8      TTE   Right Ventricle: The right ventricle is normal in size measuring 3.8 cm at the base in the apical right ventricle-focused view. Global right ventricular systolic function appears normal. Tricuspid annular plane systolic excursion (TAPSE) is 2.5 cm.   Tissue Doppler-derived tricuspid annular peak systolic velocity (S prime) is 12.5 cm/s. The estimated PA systolic pressure is 28 mmHg.   left atrial volume index is mildly enlarged, measuring 38.00 cc/m2.     Left Ventricle: The left ventricle is upper limit of normal, with an end-diastolic diameter of 5.0 cm, and an end-systolic diameter of 2.9 cm. LV  wall thickness is normal, with the septum measuring 0.8 cm and the posterior wall measuring 0.9 cm across.   Relative wall thickness was normal at 0.36, and the LV mass index was 97.2 g/m2 consistent with normal left ventricular mass. There are no regional wall motion abnormalities. Left ventricular systolic function appears normal. Visually estimated ejection   fraction is 60-65%. The LV Doppler derived stroke volume equals 63.0 ccs.    TTE 8/17    1 - Upper limit of normal left ventricular enlargement.     2 - Normal left ventricular systolic function (EF 60-65%).     3 - No wall motion abnormalities.     4 - Normal left ventricular diastolic function.     5 - Mild left atrial enlargement.     6 - Normal right ventricular systolic function .     7 - Trivial to mild mitral regurgitation.     8 - The estimated PA systolic pressure is 28 mmHg.     TTE   1 - Normal left ventricular systolic function (EF 60-65%).     2 - Mild left atrial enlargement.     3 - Normal right ventricular systolic function .     4 - The estimated PA systolic pressure is 30 mmHg.     PFTs 9/16  Normal airflow, mild restriction, normal DLCO    CT chest 9/15  Stable-appearing chronic interstitial lung disease consistent with patient's history of scleroderma. No acute abnormality is identified.  Given the long-term stability of findings dating back to 2007, follow-up as clinically warranted.    Dependent secretions throughout the esophagus to suggest dysmotility  RHC 6/29/ 15    AOSAT: 97  FICKCI: 3.42  FICKCO: 6.06  PAPRES: 35/12 (21)  PASAT: 73  PVR: 1.98  PWPRES: 12/12 (9)  RAPRES: 11/9 (5)  RVPRES: 31/1, 6    TTE 6/15  1 - Normal left ventricular systolic function (EF 60-65%).   2 - Normal left ventricular diastolic function.   3 - Normal right ventricular systolic function .   4 - Trivial tricuspid regurgitation.      Review of Systems   Constitution: Negative for chills, fever, malaise/fatigue and weight gain.   HENT: Negative.    Eyes:  "Negative.    Cardiovascular: Negative for chest pain, dyspnea on exertion, leg swelling, near-syncope, orthopnea, palpitations, paroxysmal nocturnal dyspnea and syncope.   Respiratory: Negative for cough and shortness of breath.    Endocrine: Negative.    Skin: Negative.    Musculoskeletal: Negative.         Foot ulcer pain   Gastrointestinal: Negative for bloating, abdominal pain and change in bowel habit.   Neurological: Negative for dizziness and light-headedness.   Psychiatric/Behavioral: Negative for depression.        Objective:   BP (!) 148/66 (BP Location: Right arm, Patient Position: Sitting, BP Method: Small (Automatic))   Pulse 63   Ht 5' 6" (1.676 m)   Wt 73.4 kg (161 lb 13.1 oz)   BMI 26.12 kg/m²       Physical Exam   Constitutional: She is oriented to person, place, and time. She appears well-developed and well-nourished.   HENT:   Head: Normocephalic and atraumatic.   Eyes: Right eye exhibits no discharge. Left eye exhibits no discharge.   Neck: Neck supple. No JVD present. No thyromegaly present.   Cardiovascular: Normal rate and regular rhythm. Exam reveals no gallop and no friction rub.   No murmur heard.       Pulmonary/Chest: Effort normal and breath sounds normal. No respiratory distress. She has no wheezes. She has no rales.   Abdominal: Soft. Bowel sounds are normal. She exhibits no distension. There is no tenderness.   Musculoskeletal: Normal range of motion. She exhibits no edema or tenderness.   Neurological: She is alert and oriented to person, place, and time. No cranial nerve deficit. Coordination normal.   Skin: Skin is warm and dry. No rash noted.   Severe tightening of skin hands   Psychiatric: She has a normal mood and affect. Judgment and thought content normal.         Lab Results   Component Value Date    BNP 37 03/12/2019     03/12/2019    K 3.8 03/12/2019    MG 2.1 10/09/2016     03/12/2019    CO2 23 03/12/2019    BUN 20 03/12/2019    CREATININE 0.7 03/12/2019    " GLU 91 03/12/2019    AST 20 03/12/2019    ALT 11 03/12/2019    ALBUMIN 3.5 03/12/2019    PROT 8.5 (H) 03/12/2019    BILITOT 0.5 03/12/2019    CHOL 115 (L) 09/06/2018    HDL 43 09/06/2018    LDLCALC 62.2 (L) 09/06/2018    TRIG 49 09/06/2018       Magnesium   Date Value Ref Range Status   10/09/2016 2.1 1.6 - 2.6 mg/dL Final       Lab Results   Component Value Date    WBC 5.04 03/12/2019    HGB 13.1 03/12/2019    HCT 39.8 03/12/2019    MCV 93 03/12/2019     03/12/2019       Lab Results   Component Value Date    INR 1.0 06/29/2015    INR 1.0 01/21/2013    INR 1.0 09/29/2007       BNP   Date Value Ref Range Status   03/12/2019 37 0 - 99 pg/mL Final     Comment:     Values of less than 100 pg/ml are consistent with non-CHF populations.   06/01/2018 75 0 - 99 pg/mL Final     Comment:     Values of less than 100 pg/ml are consistent with non-CHF populations.   06/27/2017 48 0 - 99 pg/mL Final     Comment:     Values of less than 100 pg/ml are consistent with non-CHF populations.       LD   Date Value Ref Range Status   02/22/2018 161 110 - 260 U/L Final     Comment:     Results are increased in hemolyzed samples.   09/30/2007 139 110 - 260 U/L Final           Assessment:       1. Pulmonary hypertension associated with systemic disorder- normal PAP on RHC  with normal RV function by echo, BNP normal, euvolemic on exam- no 6mw due to foot ulcers. Now with diastolic dysfxn on echo along with mild LAE   2. Scleroderma    3. Telangiectasia    4. ILD (interstitial lung disease) - PFts also surprisingly good, chest imaging stable        Plan:       Given appearance of diastolic dysfxn on echo today and uncontrolled HTN/swelling, will go ahead and add aldactone 25mg daily today- needs BMP/BNP this am and again in 1 wk    no PH medicine changes today-    From a PH standpoint, based on her echo today and last RHC she appears to be doing very well- PFTs to be repeated per Dr Ahuja, chest imaging stable    Keep salt intake to  under 2000 mg sodium, fluids to under 2 L (64 oz)    Check weights every morning after getting out of bed and urinating. If weight goes up 3# overnight or 5# in one week she should call us    RTC 4 mo with labs and walk if she can do it

## 2019-03-12 NOTE — PATIENT INSTRUCTIONS
Add spironolactone 25mg once a day for blood pressure, the stiff heart and swelling.     Start tracking blood pressure at home and bring log to next visit.    Labs today and again at one week to recheck the kidney function and potassium level on the new medicine    Keep salt intake to under 2000 mg sodium, fluids to under 2 L (64 oz)

## 2019-03-13 ENCOUNTER — HOSPITAL ENCOUNTER (OUTPATIENT)
Dept: RADIOLOGY | Facility: OTHER | Age: 69
Discharge: HOME | End: 2019-03-13
Attending: INTERNAL MEDICINE
Payer: MEDICARE

## 2019-03-13 ENCOUNTER — OFFICE VISIT (OUTPATIENT)
Dept: PAIN MEDICINE | Facility: CLINIC | Age: 69
End: 2019-03-13
Payer: MEDICARE

## 2019-03-13 VITALS
WEIGHT: 158.75 LBS | DIASTOLIC BLOOD PRESSURE: 75 MMHG | TEMPERATURE: 98 F | RESPIRATION RATE: 18 BRPM | SYSTOLIC BLOOD PRESSURE: 115 MMHG | BODY MASS INDEX: 25.51 KG/M2 | HEIGHT: 66 IN | HEART RATE: 75 BPM

## 2019-03-13 DIAGNOSIS — R10.11 RIGHT UPPER QUADRANT ABDOMINAL PAIN: ICD-10-CM

## 2019-03-13 DIAGNOSIS — J84.9 ILD (INTERSTITIAL LUNG DISEASE): Primary | ICD-10-CM

## 2019-03-13 DIAGNOSIS — M34.89 CUTANEOUS SCLERODERMA: ICD-10-CM

## 2019-03-13 DIAGNOSIS — J84.9 ILD (INTERSTITIAL LUNG DISEASE): ICD-10-CM

## 2019-03-13 DIAGNOSIS — G60.9 IDIOPATHIC NEUROPATHY: ICD-10-CM

## 2019-03-13 DIAGNOSIS — M34.9 SCLERODERMA: ICD-10-CM

## 2019-03-13 DIAGNOSIS — G89.4 CHRONIC PAIN DISORDER: Primary | ICD-10-CM

## 2019-03-13 PROCEDURE — 99999 PR PBB SHADOW E&M-EST. PATIENT-LVL III: CPT | Mod: PBBFAC,,, | Performed by: NURSE PRACTITIONER

## 2019-03-13 PROCEDURE — 99213 OFFICE O/P EST LOW 20 MIN: CPT | Mod: S$PBB,,, | Performed by: NURSE PRACTITIONER

## 2019-03-13 PROCEDURE — 71250 CT THORAX DX C-: CPT | Mod: 26,,, | Performed by: RADIOLOGY

## 2019-03-13 PROCEDURE — 99213 OFFICE O/P EST LOW 20 MIN: CPT | Mod: PBBFAC,25 | Performed by: NURSE PRACTITIONER

## 2019-03-13 PROCEDURE — 74150 CT ABDOMEN W/O CONTRAST: CPT | Mod: TC

## 2019-03-13 PROCEDURE — 99213 PR OFFICE/OUTPT VISIT, EST, LEVL III, 20-29 MIN: ICD-10-PCS | Mod: S$PBB,,, | Performed by: NURSE PRACTITIONER

## 2019-03-13 PROCEDURE — 74150 CT ABDOMEN W/O CONTRAST: CPT | Mod: 26,,, | Performed by: RADIOLOGY

## 2019-03-13 PROCEDURE — 74150 CT CHEST ABDOMEN WITHOUT CONTRAST (XPD): ICD-10-PCS | Mod: 26,,, | Performed by: RADIOLOGY

## 2019-03-13 PROCEDURE — 71250 CT CHEST ABDOMEN WITHOUT CONTRAST (XPD): ICD-10-PCS | Mod: 26,,, | Performed by: RADIOLOGY

## 2019-03-13 PROCEDURE — 25500020 PHARM REV CODE 255: Performed by: INTERNAL MEDICINE

## 2019-03-13 PROCEDURE — 99999 PR PBB SHADOW E&M-EST. PATIENT-LVL III: ICD-10-PCS | Mod: PBBFAC,,, | Performed by: NURSE PRACTITIONER

## 2019-03-13 RX ADMIN — IOHEXOL 15 ML: 350 INJECTION, SOLUTION INTRAVENOUS at 01:03

## 2019-03-13 NOTE — PROGRESS NOTES
Chronic Pain - Follow Up    Referring Physician: No ref. provider found    Chief Complaint:   Chief Complaint   Patient presents with    Follow-up        SUBJECTIVE: Disclaimer: This note has been generated using voice-recognition software. There may be typographical errors that have been missed during proof-reading    Interval History 3/13/2019:  The patient returns to clinic today for follow up. She continues to report bilateral foot pain that is burning and tingling in nature. Her pain is worse with prolonged walking and standing. She continues to have bilateral foot wounds. She reports that these wounds have significantly improved since last visit. She is currently taking Vitamin B12 with benefit. She continues to report benefit with Lyrica. She is currently taking this once daily. She denies any other health changes. Her pain today is 5/10.    Interval History 2/6/2019:  The patient returns to clinic today for follow up. She reports that her bilateral foot wounds have significantly improved since last visit. She does report some significant improvement in her foot pain. She reports intermittent bilateral foot pain especially with prolonged walking. She describes this pain as heavy and tingling in nature. She is no longer taking Celebrex. She is currently taking Lyrica 150 mg BID with benefit. She does report that the Lyrica is expensive for her. She denies any other health changes. Her pain today is 5/10.    Interval History 12/5/18:  Patient returns to clinic today for follow up. She reports that since increasing her medications, she is having significant improvement in pain during the day time. She is currently taking Lyrica 75mg TID and Celebrex 200mg TID. However, she states that the burning  And tingling pain in both her feet increase in the evening around 6 or 7pm. She is unable to sleep during the nights due to the pain. She is still wearing her bilateral boots due to non healing ulcers from her  scleroderma.     Interval History 8/30/2018:  The patient returns to clinic today for follow up. She continues to report bilateral foot pain that is burning and tingling in nature. She reports that this pain is worse at night. She is currently taking Lyrica 75 mg BID with limited relief. She did not have any benefit with Mobic. She continues to take Aleve with some benefit. She continues to have nonhealing ulcers. She wears an ACE bandage to her right calf, as well as boots to her bilateral feet. She denies any other health changes. Her pain today is 7/10.     Interval History 7/11/2018:  Since previous encounter she has weaned from gabapentin to 600mg / day and did not notice significant worsening of pain, but was having somnelence from higher doses of the medication in the past.  We are weaning in an attempt to trial Lyrica as an alternative to gabapentin.  Tramadol causes significant sedation, limiting its use.  She has discontinued the use of the tramadol.  Additionally she does have benefit from anti-inflammatory medication, has been using aleve, has not trialed CANTRELL-2 inhibitors in the past.    Interval history 05/16/2018:      The patient has had x-rays of the lumbar spine which did not reveal any evidence of significant neuroforaminal stenosis with degenerative disc disease.  There is mild facet arthropathy.  She has escalated her gabapentin and is currently taking 2100 mg of gabapentin per day without any noticeable improvement but daytime somnolence.  She continues to have nonhealing ulcers and topical pain cream is helping to a limited degree over her leg in areas where there is no skin disruption.    Initial encounter:    Yamel JUDE Shah presents to the clinic for the evaluation of foot pain. The pain started 2 years ago following ulcers and symptoms have been worsening.    Brief history: History of scleroderma    Pain Description:    The pain is located in the both feet in the area of slowly healing  chronic foot ulcers which were partly from venous insufficiency and stasis associated with her scleroderma.  In her right lower extremity she describes radicular symptoms in the L4/5 distribution.    At BEST  5/10     At WORST  10/10 on the WORST day.      On average pain is rated as 8/10.     Today the pain is rated as 8/10    The pain is described as burning, sharp, shooting and constant      Symptoms interfere with daily activity, sleeping and work.     Exacerbating factors: Laying, Walking, Night Time, Morning and Getting out of bed/chair.      Mitigating factors medications.     Patient denies night fever/night sweats, urinary incontinence, bowel incontinence, significant weight loss, significant motor weakness and loss of sensations.  Patient denies any suicidal or homicidal ideations    Pain Medications:  Current:  Aleve  Lyrica 150 mg daily    Tried in Past:  NSAIDs -Aleve, Mobic, Celebrex  TCA -Never  SNRI -Never  Anti-convulsants - Gabapentin, Lyrica  Muscle Relaxants -Never  Opioids-Tramadol    Physical Therapy/Home Exercise: no       report:  Reviewed and consistent with medication use as prescribed.    Pain Procedures: none    Chiropractor -never  Acupuncture - never  TENS unit -never  Spinal decompression -never  Joint replacement -never    Imaging:   X-ray lumbar spine 6/1/2016:  2 views: Alignment is normal. There is mild DJD. No fracture dislocation bone destruction seen.   Impression      Mild DJD.     Xray lumbar spine 4/2018:  COMPARISON:  June 2016    FINDINGS:  There is slight curvature of the lumbar spine.  The vertebral bodies are normally aligned and normal in height.  Mild disc space narrowing present at L5-S1.  There is mild facet degenerative change in the lower spine.  No significant osteophytic spurring present.  There is no change in alignment with flexion or extension.      Past Medical History:   Diagnosis Date    Abnormal Pap smear     Acid reflux     Allergy     Arthritis      GERD (gastroesophageal reflux disease)     History of migraine headaches     Hypertension     Idiopathic neuropathy 7/20/2012    ILD (interstitial lung disease) 11/6/2013    Iron deficiency anemia 3/18/2014    MRSA carrier     Osteopenia     Pulmonary fibrosis     Pulmonary hypertension     Raynaud's disease     Scleroderma, diffuse     Sjogren's syndrome     Vitamin D deficiency 11/14/2013     Past Surgical History:   Procedure Laterality Date    BREAST BIOPSY      Left, benign    CERVICAL CONIZATION   W/ LASER  1970    COLONOSCOPY N/A 4/4/2014    Performed by [facility] Gusman [facility] at [facility] (4TH FLR)    DILATION AND CURETTAGE OF UTERUS      EGD (ESOPHAGOGASTRODUODENOSCOPY) N/A 4/4/2014    Performed by [facility] at [facility] (4TH FLR)    ESOPHAGOGASTRODUODENOSCOPY (EGD) N/A 1/26/2017    Performed by Annamaria Mendoza MD at [facility] (4TH FLR)    HYSTERECTOMY  1990    FRANDY (AUB, Fibroids), ovaries remain     Social History     Socioeconomic History    Marital status:      Spouse name: Lewis    Number of children: 4    Years of education: Not on file    Highest education level: Not on file   Social Needs    Financial resource strain: Not on file    Food insecurity - worry: Not on file    Food insecurity - inability: Not on file    Transportation needs - medical: Not on file    Transportation needs - non-medical: Not on file   Occupational History    Occupation: -retired     Employer: [employer]     Comment: [place]     Employer: RETIRED   Tobacco Use    Smoking status: Never Smoker    Smokeless tobacco: Never Used   Substance and Sexual Activity    Alcohol use: No     Alcohol/week: 0.0 oz     Frequency: Never    Drug use: No    Sexual activity: Yes     Partners: Male   Other Topics Concern    Are you pregnant or think you may be? No    Breast-feeding No   Social History Narrative         Family History   Problem Relation Age of  Onset    Breast cancer Mother     Stroke Father     No Known Problems Daughter     No Known Problems Son     No Known Problems Daughter     No Known Problems Son     Breast cancer Sister     Breast cancer Maternal Aunt     Melanoma Neg Hx     Colon cancer Neg Hx     Crohn's disease Neg Hx     Stomach cancer Neg Hx     Ulcerative colitis Neg Hx     Rectal cancer Neg Hx     Irritable bowel syndrome Neg Hx     Esophageal cancer Neg Hx     Celiac disease Neg Hx        Review of patient's allergies indicates:   Allergen Reactions    Sulfa (sulfonamide antibiotics)      Other reaction(s): Rash       Current Outpatient Medications   Medication Sig    ADCIRCA 20 mg Tab TAKE TWO  TABLETS (40MG) BY MOUTH ONCE DAILY. CALL [phone] TO REFILL.    albuterol (VENTOLIN HFA) 90 mcg/actuation inhaler Inhale 2 puffs into the lungs every 4 (four) hours as needed for Wheezing. Rescue    b complex vitamins tablet Take 1 tablet by mouth once daily.    ergocalciferol (ERGOCALCIFEROL) 50,000 unit Cap TAKE 1 CAPSULE BY MOUTH EVERY 7 DAYS    ferrous sulfate 325 (65 FE) MG EC tablet Take 1 tablet (325 mg total) by mouth 3 (three) times daily. (Patient taking differently: Take 325 mg by mouth once daily. )    multivitamin capsule Take 1 capsule by mouth once daily.     NIFEdipine (ADALAT CC) 90 MG TbSR TAKE 1 TABLET(90 MG) BY MOUTH EVERY DAY    omeprazole (PRILOSEC) 40 MG capsule Take 1 capsule (40 mg total) by mouth 2 (two) times daily before meals. (Patient taking differently: Take 40 mg by mouth every morning. )    pravastatin (PRAVACHOL) 20 MG tablet Take 1 tablet (20 mg total) by mouth every evening.    pregabalin (LYRICA) 150 MG capsule Take 1 capsule (150 mg total) by mouth 3 (three) times daily. (Patient taking differently: Take 150 mg by mouth once daily. )    ranitidine (ZANTAC) 150 MG capsule TAKE 1 CAPSULE(150 MG) BY MOUTH TWICE DAILY (Patient taking differently: TAKE 1 CAPSULE(150 MG) BY MOUTH  "DAILY EVENINGS)    spironolactone (ALDACTONE) 25 MG tablet Take 1 tablet (25 mg total) by mouth once daily.     No current facility-administered medications for this visit.        REVIEW OF SYSTEMS:    GENERAL:  No weight loss, malaise or fevers.  HEENT:   No recent changes in vision or hearing  NECK:  Negative for lumps,  difficulty with swallowing associated with scleroderma.  RESPIRATORY:  Negative for cough, wheezing or shortness of breath, patient denies any recent URI. Albuterol (history of ILD)  CARDIOVASCULAR:  Negative for chest pain, leg swelling or palpitations.  GI:  Negative for abdominal discomfort, blood in stools or black stools or change in bowel habits. GERD controlled zantac  MUSCULOSKELETAL:  See HPI.  SKIN:   Chronic low healing venous stasis ulcerations bilateral lower extremities   PSYCH:  No mood disorder or recent psychosocial stressors.  Patients sleep is not disturbed secondary to pain.  HEMATOLOGY/LYMPHOLOGY:   History of iron deficiency anemia states that she received several units of blood several months ago but has been having improved feeling in her feet since transfusion, takes daily iron supplementation.    ENDO: No history of diabetes or thyroid dysfunction  NEURO:   No history of headaches, syncope, paralysis, seizures or tremors.  All other reviewed and negative other than HPI.    OBJECTIVE:    /75   Pulse 75   Temp 98 °F (36.7 °C) (Oral)   Resp 18   Ht 5' 6" (1.676 m)   Wt 72 kg (158 lb 11.7 oz)   BMI 25.62 kg/m²     PHYSICAL EXAMINATION:    GENERAL: Well appearing, in no acute distress, alert and oriented x3.  PSYCH:  Mood and affect appropriate.  SKIN: Tight skin associated with scleroderma.   HEAD/FACE:  Normocephalic, atraumatic. Cranial nerves grossly intact.  CV: RRR with palpation of the radial artery.  PULM: No evidence of respiratory difficulty, symmetric chest rise.  BACK:  There is mild pain with palpation over the facet joints of the lumbar spine " bilaterally. Limited ROM with mild pain on extension. Positive facet loading bilaterally.    EXTREMITIES: Contractures over on bilateral hands with ulcerations to knuckles. Boots noted to bilateral feet.   MUSCULOSKELETAL:  There is no pain to palpation over the greater trochanteric bursa bilaterally.  FABERs test is positive bilaterally.  FADIRs test is negative.   Bilateral lower extremity strength testing limited secondary to pain in the feet.    NEURO: Bilateral lower extremity coordination and muscle stretch reflexes are physiologic and symmetric. Decreased sensation to BLE. Plantar response are downgoing. No clonus.  No loss of sensation is noted.  GAIT: Antalgic, ambulates without assistance         Lab Results   Component Value Date    WBC 5.04 03/12/2019    HGB 13.1 03/12/2019    HCT 39.8 03/12/2019    MCV 93 03/12/2019     03/12/2019     BMP  Lab Results   Component Value Date     03/12/2019    K 3.8 03/12/2019     03/12/2019    CO2 23 03/12/2019    BUN 20 03/12/2019    CREATININE 0.7 03/12/2019    CALCIUM 9.2 03/12/2019    ANIONGAP 7 (L) 03/12/2019    ESTGFRAFRICA >60.0 03/12/2019    EGFRNONAA >60.0 03/12/2019         ASSESSMENT: 68 y.o. year old female with pain, consistent with     Encounter Diagnoses   Name Primary?    Chronic pain disorder Yes    Cutaneous scleroderma     Idiopathic neuropathy        PLAN:     - Previous imaging was reviewed and discussed with the patient today.    - Continue Lyrica 150 mg daily. She may call for refills.     - Continue to follow up with wound care.     - Continues home exercise routine.     - RTC in 3 months.     - Dr. King was consulted on the patient and agrees with this plan.    The above plan and management options were discussed at length with patient. Patient is in agreement with the above and verbalized understanding.     Viktoriya Camara, SEAN  03/13/2019

## 2019-03-14 ENCOUNTER — TELEPHONE (OUTPATIENT)
Dept: TRANSPLANT | Facility: CLINIC | Age: 69
End: 2019-03-14

## 2019-03-14 NOTE — TELEPHONE ENCOUNTER
----- Message from Lucy Blum MA sent at 3/14/2019  3:24 PM CDT -----  Contact: pt      ----- Message -----  From: Deisy Le  Sent: 3/14/2019   3:18 PM  To: [facility]    Pt of Dr. Lim is calling to get a referral to the [facility] and can be reached at [phone].    Thank you

## 2019-03-14 NOTE — TELEPHONE ENCOUNTER
Returned call to pt, who requests referral for digital HTN clinic. Uncertain how to refer patient. Message sent to [facility] pool regarding same. Requested pt contact us again, if she does not hear back from somebody regarding possible enrollment in program in next several days. Pt verbalized understanding.

## 2019-03-14 NOTE — TELEPHONE ENCOUNTER
Contacted patient regarding scheduling pulmonary visit with Dr. Nixon.  Explained the reason for the referral to us from Dr. Guo and offered to schedule pt tomorrow, 3/15.  She accepts.  Explained our location and told patient we would see her for 10:00.  Pt verbalized understanding.    ----- Message from Roberta Nixon DO sent at 3/14/2019  1:42 PM CDT -----  Can we schedule as a pulmonary appointment with no testing?  Thanks.      ----- Message -----  From: Mirian Cabrera MA  Sent: 3/13/2019   4:18 PM  To: Roberta Nixon DO    Afternoon dr nixon. Dr guo would like you to see miss james.  For her ILD AND scleroderma

## 2019-03-15 ENCOUNTER — OFFICE VISIT (OUTPATIENT)
Dept: TRANSPLANT | Facility: CLINIC | Age: 69
End: 2019-03-15
Payer: MEDICARE

## 2019-03-15 VITALS
WEIGHT: 156 LBS | DIASTOLIC BLOOD PRESSURE: 62 MMHG | HEIGHT: 66 IN | OXYGEN SATURATION: 98 % | SYSTOLIC BLOOD PRESSURE: 129 MMHG | TEMPERATURE: 97 F | BODY MASS INDEX: 25.07 KG/M2 | RESPIRATION RATE: 20 BRPM | HEART RATE: 80 BPM

## 2019-03-15 DIAGNOSIS — I27.20 PULMONARY HYPERTENSION: ICD-10-CM

## 2019-03-15 DIAGNOSIS — I10 HYPERTENSION, UNSPECIFIED TYPE: ICD-10-CM

## 2019-03-15 DIAGNOSIS — I27.0 PRIMARY PULMONARY HYPERTENSION: ICD-10-CM

## 2019-03-15 DIAGNOSIS — M35.9 PULMONARY ARTERY HYPERTENSION ASSOCIATED WITH CONNECTIVE TISSUE DISEASE: ICD-10-CM

## 2019-03-15 DIAGNOSIS — I73.00 RAYNAUD'S DISEASE WITHOUT GANGRENE: ICD-10-CM

## 2019-03-15 DIAGNOSIS — I78.1 TELANGIECTASIA: ICD-10-CM

## 2019-03-15 DIAGNOSIS — M34.9 SCLERODERMA: ICD-10-CM

## 2019-03-15 DIAGNOSIS — I27.21 PULMONARY ARTERY HYPERTENSION ASSOCIATED WITH CONNECTIVE TISSUE DISEASE: ICD-10-CM

## 2019-03-15 DIAGNOSIS — J84.9 ILD (INTERSTITIAL LUNG DISEASE): ICD-10-CM

## 2019-03-15 DIAGNOSIS — I10 ESSENTIAL HYPERTENSION: Primary | ICD-10-CM

## 2019-03-15 DIAGNOSIS — J84.9 ILD (INTERSTITIAL LUNG DISEASE): Primary | ICD-10-CM

## 2019-03-15 PROCEDURE — 99204 OFFICE O/P NEW MOD 45 MIN: CPT | Mod: S$PBB,GC,, | Performed by: INTERNAL MEDICINE

## 2019-03-15 PROCEDURE — 99213 OFFICE O/P EST LOW 20 MIN: CPT | Mod: PBBFAC | Performed by: INTERNAL MEDICINE

## 2019-03-15 PROCEDURE — 99999 PR PBB SHADOW E&M-EST. PATIENT-LVL III: CPT | Mod: PBBFAC,,, | Performed by: INTERNAL MEDICINE

## 2019-03-15 PROCEDURE — 99204 PR OFFICE/OUTPT VISIT, NEW, LEVL IV, 45-59 MIN: ICD-10-PCS | Mod: S$PBB,GC,, | Performed by: INTERNAL MEDICINE

## 2019-03-15 PROCEDURE — 99999 PR PBB SHADOW E&M-EST. PATIENT-LVL III: ICD-10-PCS | Mod: PBBFAC,,, | Performed by: INTERNAL MEDICINE

## 2019-03-15 RX ORDER — TADALAFIL 20 MG/1
40 TABLET ORAL DAILY
Qty: 180 TABLET | Refills: 3 | Status: CANCELLED | OUTPATIENT
Start: 2019-03-15

## 2019-03-15 NOTE — PROGRESS NOTES
PULMONARY MEDICINE INITIAL EVALUATION                                                                                                                                           Reason for Visit:  Evaluation for Interstitial lung disease    Referring Physician: Luis Ahuja MD    History of Present Illness: Ms. Yamel Shah is a 68YF with Systemic scleroderma (+SSA, +SCL-70, -RNAP3, ILD), diagnosed 1983- She has been seeing Dr. Ahuja for over 10 years. She has stable ILD on imaging. She had mild exercise induced pulmonary hypertension in 2013 that has resolved on 6/2015 RHC and most recent ECHO done 3/2019 shows normal EF with grade 2 diastolic dysfunction, increased sPAP 41 compared to 28mmHg in 06/2018. She was started on Revatio 2013, but had difficulty with affording it, so she was switched to Adcirca 40mg daily. She follows with Dr. Ahuja for Rheumatology and Dr. Cooper for Pulmonary hypertension.      She is referred to Pulmonary to establish care for ILD. She reports her breathing is good. Walking up to 1-2 blocks make her SOB. Over last 1 year, she feels the distance has not changed. She has acid reflux at nights when she starts coughing and has occasional wheezing as well. No cough, wheezing, chest pain, fever, chills in the daytime. Denies orthopnea or PND. Denies being on ventilator or being hospitalized because of her breathing. She is on PRN albuterol inhaler for her breathing but she has not used it in last 1 year.     She also has raynaud' of both the hands. Sometimes she has difficulty swallowing. She has bilateral digital ulcerations of her hands and feet. Feet ulcers are painful which limit her exercise capacity.    Past Medical History:   Diagnosis Date    Abnormal Pap smear     Acid reflux     Allergy     Arthritis     GERD (gastroesophageal reflux disease)     History of migraine headaches     Hypertension     Idiopathic neuropathy 7/20/2012    ILD (interstitial lung  disease) 11/6/2013    Iron deficiency anemia 3/18/2014    MRSA carrier     Osteopenia     Pulmonary fibrosis     Pulmonary hypertension     Raynaud's disease     Scleroderma, diffuse     Sjogren's syndrome     Vitamin D deficiency 11/14/2013       Past Surgical History:   Procedure Laterality Date    BREAST BIOPSY      Left, benign    CERVICAL CONIZATION   W/ LASER  1970    COLONOSCOPY N/A 4/4/2014    Performed by Gennaro Gusman MD at [facility] ENDO (4TH FLR)    DILATION AND CURETTAGE OF UTERUS      EGD (ESOPHAGOGASTRODUODENOSCOPY) N/A 4/4/2014    Performed by Gennaro Gusman MD at [facility] ENDO (4TH FLR)    ESOPHAGOGASTRODUODENOSCOPY (EGD) N/A 1/26/2017    Performed by Annamaria Mendoza MD at [facility] ENDO (4TH FLR)    HYSTERECTOMY  1990    FRANDY (AUB, Fibroids), ovaries remain       Allergies: Sulfa (sulfonamide antibiotics)    Current Outpatient Medications   Medication Sig    ADCIRCA 20 mg Tab TAKE TWO  TABLETS (40MG) BY MOUTH ONCE DAILY. CALL [phone] TO REFILL.    albuterol (VENTOLIN HFA) 90 mcg/actuation inhaler Inhale 2 puffs into the lungs every 4 (four) hours as needed for Wheezing. Rescue    b complex vitamins tablet Take 1 tablet by mouth once daily.    ergocalciferol (ERGOCALCIFEROL) 50,000 unit Cap TAKE 1 CAPSULE BY MOUTH EVERY 7 DAYS    ferrous sulfate 325 (65 FE) MG EC tablet Take 1 tablet (325 mg total) by mouth 3 (three) times daily. (Patient taking differently: Take 325 mg by mouth once daily. )    multivitamin capsule Take 1 capsule by mouth once daily.     NIFEdipine (ADALAT CC) 90 MG TbSR TAKE 1 TABLET(90 MG) BY MOUTH EVERY DAY    omeprazole (PRILOSEC) 40 MG capsule Take 1 capsule (40 mg total) by mouth 2 (two) times daily before meals. (Patient taking differently: Take 40 mg by mouth every morning. )    pravastatin (PRAVACHOL) 20 MG tablet Take 1 tablet (20 mg total) by mouth every evening.    pregabalin (LYRICA) 150 MG capsule Take 1 capsule (150 mg total) by mouth 3  (three) times daily. (Patient taking differently: Take 150 mg by mouth once daily. )    ranitidine (ZANTAC) 150 MG capsule TAKE 1 CAPSULE(150 MG) BY MOUTH TWICE DAILY (Patient taking differently: TAKE 1 CAPSULE(150 MG) BY MOUTH DAILY EVENINGS)    spironolactone (ALDACTONE) 25 MG tablet Take 1 tablet (25 mg total) by mouth once daily.     No current facility-administered medications for this visit.        Immunization History   Administered Date(s) Administered    Influenza 11/02/2015    Influenza - High Dose 10/11/2017, 10/27/2018    Influenza - Quadrivalent - PF 09/27/2016    Influenza Split 11/03/2006, 10/08/2007, 09/29/2009, 09/26/2014    Pneumococcal Conjugate 01/16/2013    Pneumococcal Conjugate - 13 Valent 01/16/2013    Pneumococcal Polysaccharide - 23 Valent 09/27/2016    Tdap 02/06/2019    Zoster 01/16/2013    Zoster Recombinant 11/30/2018, 02/12/2019     Family History:    Family History   Problem Relation Age of Onset    Breast cancer Mother     No Known Problems Daughter     No Known Problems Son     No Known Problems Daughter     No Known Problems Son     Breast cancer Sister     Breast cancer Maternal Aunt     Osteoarthritis Brother     Melanoma Neg Hx     Colon cancer Neg Hx     Crohn's disease Neg Hx     Stomach cancer Neg Hx     Ulcerative colitis Neg Hx     Rectal cancer Neg Hx     Irritable bowel syndrome Neg Hx     Esophageal cancer Neg Hx     Celiac disease Neg Hx      Social History     Substance and Sexual Activity   Alcohol Use No    Alcohol/week: 0.0 oz    Frequency: Never      Social History     Substance and Sexual Activity   Drug Use No      Social History     Socioeconomic History    Marital status:      Spouse name: Lewis    Number of children: 4    Years of education: Not on file    Highest education level: Not on file   Social Needs    Financial resource strain: Not on file    Food insecurity - worry: Not on file    Food insecurity -  "inability: Not on file    Transportation needs - medical: Not on file    Transportation needs - non-medical: Not on file   Occupational History    Occupation: -retired     Employer: [employer]     Comment: [place] [employer]     Employer: RETIRED   Tobacco Use    Smoking status: Never Smoker    Smokeless tobacco: Never Used   Substance and Sexual Activity    Alcohol use: No     Alcohol/week: 0.0 oz     Frequency: Never    Drug use: No    Sexual activity: Yes     Partners: Male   Other Topics Concern    Are you pregnant or think you may be? No    Breast-feeding No   Social History Narrative         Review of Systems   Constitutional: Negative for chills, fever, malaise/fatigue and weight loss.   HENT: Negative for congestion, ear discharge, ear pain, nosebleeds, sinus pain and sore throat.    Eyes: Negative for blurred vision and double vision.   Cardiovascular: Negative for palpitations, orthopnea, claudication, leg swelling and PND.   Gastrointestinal: Positive for heartburn. Negative for abdominal pain, diarrhea, nausea and vomiting.   Musculoskeletal: Positive for joint pain and myalgias. Negative for back pain and neck pain.        Raynaud's phenomenon   Skin: Positive for rash (Digital ulcerations). Negative for itching.   Neurological: Positive for tingling. Negative for tremors, focal weakness, seizures and headaches.   Endo/Heme/Allergies: Does not bruise/bleed easily.   Psychiatric/Behavioral: Negative for depression and hallucinations. The patient is not nervous/anxious and does not have insomnia.      Vitals  /62   Pulse 80   Temp 96.5 °F (35.8 °C) (Oral)   Resp 20   Ht 5' 6" (1.676 m)   Wt 70.8 kg (156 lb)   SpO2 98% Comment: room air  BMI 25.18 kg/m²   Physical Exam   Constitutional: She is oriented to person, place, and time and well-developed, well-nourished, and in no distress. No distress.   HENT:   Head: Normocephalic and atraumatic.   Facial and forehead " telangectasia   Eyes: EOM are normal.   Neck: Neck supple. No tracheal deviation present.   Cardiovascular: Normal rate and regular rhythm. Exam reveals friction rub.   No murmur heard.  Pulmonary/Chest: Effort normal. No stridor. No respiratory distress. She has no wheezes. She has rales (Bilateral basal crackles).   Abdominal: Soft. Bowel sounds are normal. She exhibits no distension. There is no tenderness.   Musculoskeletal: She exhibits deformity (Sclerodactyly of both hands, feet, tiny ulcers of the knuckles). She exhibits no edema or tenderness.   Neurological: She is alert and oriented to person, place, and time. GCS score is 15.   Skin: Skin is warm and dry.   Dermal sclerosis on face, hands   Psychiatric: Mood, affect and judgment normal.       Labs:  Hospital Outpatient Visit on 03/12/2019   Component Date Value    BSA 03/12/2019 1.82     TDI SEPTAL 03/12/2019 0.06     LV LATERAL E/E' RATIO 03/12/2019 13.43     LV SEPTAL E/E' RATIO 03/12/2019 15.67     LA WIDTH 03/12/2019 3.38     TDI LATERAL 03/12/2019 0.07     LVIDD 03/12/2019 5.10     IVS 03/12/2019 0.90     PW 03/12/2019 0.80     LVIDS 03/12/2019 3.39     FS 03/12/2019 34     LA volume 03/12/2019 62.66     Sinus 03/12/2019 3.15     STJ 03/12/2019 2.66     Ascending aorta 03/12/2019 3.00     LV mass 03/12/2019 151.84     LA size 03/12/2019 3.80     RVDD 03/12/2019 3.27     TAPSE 03/12/2019 2.81     RV S' 03/12/2019 13.51     Left Ventricle Relative * 03/12/2019 0.31     AV mean gradient 03/12/2019 5.40     AV valve area 03/12/2019 2.07     AV Velocity Ratio 03/12/2019 0.59     AV index (prosthetic) 03/12/2019 0.66     E/A ratio 03/12/2019 1.18     Mean e' 03/12/2019 0.07     E wave decelartion time 03/12/2019 217.17     IVRT 03/12/2019 0.10     Pulm vein S/D ratio 03/12/2019 1.35     LVOT diameter 03/12/2019 1.99     LVOT area 03/12/2019 3.11     LVOT peak jung 03/12/2019 [id number]     LVOT peak VTI 03/12/2019  25.32     Ao peak dionicio 03/12/2019 1.60     Ao VTI 03/12/2019 38.11     LVOT stroke volume 03/12/2019 78.71     AV peak gradient 03/12/2019 10.24     E/E' ratio 03/12/2019 14.46     MV Peak E Dionicio 03/12/2019 0.94     TR Max Dionicio 03/12/2019 3.09     MV Peak A Dionicio 03/12/2019 0.80     PV Peak S Dionicio 03/12/2019 0.58     PV Peak D Dionicio 03/12/2019 0.43     LV Systolic Volume 03/12/2019 47.04     LV Systolic Volume Index 03/12/2019 26.1     LV Diastolic Volume 03/12/2019 123.30     LV Diastolic Volume Index 03/12/2019 68.51     LA Volume Index 03/12/2019 34.8     LV Mass Index 03/12/2019 84.4     RA Major Axis 03/12/2019 4.53     Left Atrium Minor Axis 03/12/2019 6.00     Left Atrium Major Axis 03/12/2019 5.50     Triscuspid Valve Regurgi* 03/12/2019 38.19     RA Width 03/12/2019 4.09     Right Atrial Pressure (f* 03/12/2019 3     TV rest pulmonary artery* 03/12/2019 41    Lab Visit on 03/12/2019   Component Date Value    BNP 03/12/2019 37     WBC 03/12/2019 5.04     RBC 03/12/2019 4.29     Hemoglobin 03/12/2019 13.1     Hematocrit 03/12/2019 39.8     MCV 03/12/2019 93     MCH 03/12/2019 30.5     MCHC 03/12/2019 32.9     RDW 03/12/2019 16.7*    Platelets 03/12/2019 181     MPV 03/12/2019 11.9     Immature Granulocytes 03/12/2019 0.0     Gran # (ANC) 03/12/2019 3.3     Immature Grans (Abs) 03/12/2019 0.00     Lymph # 03/12/2019 1.2     Mono # 03/12/2019 0.3     Eos # 03/12/2019 0.2     Baso # 03/12/2019 0.03     nRBC 03/12/2019 0     Gran% 03/12/2019 65.6     Lymph% 03/12/2019 24.6     Mono% 03/12/2019 6.2     Eosinophil% 03/12/2019 3.0     Basophil% 03/12/2019 0.6     Differential Method 03/12/2019 Automated     Sodium 03/12/2019 138     Potassium 03/12/2019 3.8     Chloride 03/12/2019 108     CO2 03/12/2019 23     Glucose 03/12/2019 91     BUN, Bld 03/12/2019 20     Creatinine 03/12/2019 0.7     Calcium 03/12/2019 9.2     Total Protein 03/12/2019 8.5*    Albumin  03/12/2019 3.5     Total Bilirubin 03/12/2019 0.5     Alkaline Phosphatase 03/12/2019 104     AST 03/12/2019 20     ALT 03/12/2019 11     Anion Gap 03/12/2019 7*    eGFR if African American 03/12/2019 >60.0     eGFR if non African Amer* 03/12/2019 >60.0     Sed Rate 03/12/2019 46*    CRP 03/12/2019 34.0*       Pulmonary Function Tests 3/12/2019 3/6/2018 4/8/2016   FVC 1.82 1.93 2.06   FEV1 1.43 1.52 1.65   TLC (liters) 3.13 3.39 3.74   DLCO (ml/mmHg sec) 15 16.9 15.4   FVC% 60 64 70   FEV1% 61 64 71   FEF 25-75 1.32 1.47 1.54   FEF 25-75% 58 64 66   TLC% 62 68 77   RV 1.21 1.27 1.68   RV% 61 64 89   DLCO% 82 112 84     6MW 9/4/2015 1/19/2015 2/14/2014 6/18/2013 1/8/2013   6MWT Status not completed completed without stopping completed without stopping - completed without stopping   Patient Reported - No complaints No complaints No complaints No complaints   Was O2 used? No No No No No   6MW Distance walked (feet) - 1700 1500 1800 1740   Distance walked (meters) - 518.16 457.2 548.64 530.35   Did patient stop? - No No No No   Oxygen Saturation 99 98 100 96 98   Supplemental Oxygen Room Air Room Air Room Air Room Air Room Air   Heart Rate 67 65 65 89 71   Blood Pressure 124/65 135/62 112/55 128/60 133/77   Ju Dyspnea Rating  moderate very, very light (just noticeable) moderate light moderate   Oxygen Saturation - 100 100 96 96   Supplemental Oxygen - Room Air Room Air Room Air Room Air   Heart Rate - 49 93 120 100   Blood Pressure - 143/60 147/66 143/63 165/71   Uj Dyspnea Rating  - light moderate moderate very light   Recovery Time (seconds) - 72 61 70 140   Oxygen Saturation - 100 100 98 98   Supplemental Oxygen - Room Air Room Air Room Air Room Air   Heart Rate - 71 88 99 78     Results for DANA GARCÍA (MRN [id number]) as of 3/12/2019 09:54    Ref. Range 6/1/2018 09:18 9/6/2018 09:52   WBC Latest Ref Range: 3.90 - 12.70 K/uL 4.33 5.06   RBC Latest Ref Range: 4.00 - 5.40 M/uL 3.78 (L) 3.77 (L)    Hemoglobin Latest Ref Range: 12.0 - 16.0 g/dL 12.4 13.3   Hematocrit Latest Ref Range: 37.0 - 48.5 % 36.3 (L) 37.5   MCV Latest Ref Range: 82 - 98 fL 96 100 (H)   MCH Latest Ref Range: 27.0 - 31.0 pg 32.8 (H) 35.3 (H)   MCHC Latest Ref Range: 32.0 - 36.0 g/dL 34.2 35.5   RDW Latest Ref Range: 11.5 - 14.5 % 14.5 14.0   Platelets Latest Ref Range: 150 - 350 K/uL 176 214   MPV Latest Ref Range: 9.2 - 12.9 fL 10.9 12.4   Gran% Latest Ref Range: 38.0 - 73.0 % 55.0 67.4   Gran # (ANC) Latest Ref Range: 1.8 - 7.7 K/uL 2.4 3.4   Immature Granulocytes Latest Ref Range: 0.0 - 0.5 % 0.2 0.2   Immature Grans (Abs) Latest Ref Range: 0.00 - 0.04 K/uL 0.01 0.01   Lymph% Latest Ref Range: 18.0 - 48.0 % 32.8 21.7   Lymph # Latest Ref Range: 1.0 - 4.8 K/uL 1.4 1.1   Mono% Latest Ref Range: 4.0 - 15.0 % 8.5 7.7   Mono # Latest Ref Range: 0.3 - 1.0 K/uL 0.4 0.4   Eosinophil% Latest Ref Range: 0.0 - 8.0 % 2.8 2.4   Eos # Latest Ref Range: 0.0 - 0.5 K/uL 0.1 0.1   Basophil% Latest Ref Range: 0.0 - 1.9 % 0.7 0.6   Baso # Latest Ref Range: 0.00 - 0.20 K/uL 0.03 0.03   nRBC Latest Ref Range: 0 /100 WBC 0 0      Results for TEJAS GARCÍAINE JUDE (MRN [id number]) as of 3/12/2019 09:54    Ref. Range 9/6/2018 09:52 12/8/2018 10:53   Sodium Latest Ref Range: 136 - 145 mmol/L 139 139   Potassium Latest Ref Range: 3.5 - 5.1 mmol/L 4.1 4.4   Chloride Latest Ref Range: 95 - 110 mmol/L 108 107   CO2 Latest Ref Range: 23 - 29 mmol/L 23 25   Anion Gap Latest Ref Range: 8 - 16 mmol/L 8 7 (L)   BUN, Bld Latest Ref Range: 8 - 23 mg/dL 15 15   Creatinine Latest Ref Range: 0.5 - 1.4 mg/dL 0.7 0.7   eGFR if non African American Latest Ref Range: >60 mL/min/1.73 m^2 >60.0 >60.0   eGFR if African American Latest Ref Range: >60 mL/min/1.73 m^2 >60.0 >60.0   Glucose Latest Ref Range: 70 - 110 mg/dL 88 90   Calcium Latest Ref Range: 8.7 - 10.5 mg/dL 9.4 9.1   Alkaline Phosphatase Latest Ref Range: 55 - 135 U/L 118     Total Protein Latest Ref Range: 6.0 - 8.4 g/dL  8.5 (H)     Albumin Latest Ref Range: 3.5 - 5.2 g/dL 3.2 (L)     Total Bilirubin Latest Ref Range: 0.1 - 1.0 mg/dL 0.4     AST Latest Ref Range: 10 - 40 U/L 18     ALT Latest Ref Range: 10 - 44 U/L 11           Results for DANA GARCÍA (MRN [id number]) as of 7/24/2018 13:08    Ref. Range 11/10/2009 16:40 1/7/2015 12:33   Anti-SSA Antibody Latest Units: .22 (A)     Anti-SSA Interpretation Unknown Positive (A)     Anti-SSB Antibody Latest Units: EU 16.46     Anti-SSB Interpretation Unknown Negative     ds DNA Ab Latest Ref Range: Negative Titer Negative     Scleroderma SCL- Latest Ref Range: <20 UNITS   133 (H)   Complement (C-3) Latest Ref Range: 50 - 180 mg/dl 87     Complement (C-4) Latest Ref Range: 11 - 44 mg/dl 10 (L)     Complement,Total, Serum Latest Ref Range: 30 - 75 U/mL 43         Results for DANA GARCÍA (MRN [id number]) as of 7/24/2018 13:08    Ref. Range 1/13/2017 08:20   IgG - Serum Latest Ref Range: 650 - 1600 mg/dL 2625 (H)   IgM Latest Ref Range: 50 - 300 mg/dL 121   IgA Latest Ref Range: 40 - 350 mg/dL 487 (H)      Results for DANA GARCÍA (MRN [id number]) as of 3/12/2019 14:06    Ref. Range 2/21/2018 11:12 2/21/2018 11:12 3/12/2019 11:04   Sed Rate Latest Ref Range: 0 - 36 mm/Hr 61 (H) 61 (H) 46 (H)      Results for DANA GARCÍA (MRN [id number]) as of 3/12/2019 14:06    Ref. Range 2/21/2018 11:12 2/21/2018 11:12 3/12/2019 11:04   CRP Latest Ref Range: 0.0 - 8.2 mg/L 4.1 4.1 34.0 (H)          Imaging:  CT CHEST ABDOMEN WITHOUT CONTRAST (XPD) 03/13/2019  CLINICAL HISTORY:  pt with scleroderma with ILD complaining of worsening SOB with reduced lung volumes and right sided abdominal pain. Please evaluate.;Systemic sclerosis, unspecified    TECHNIQUE:  Low dose axial images, sagittal and coronal reformations were obtained from the thoracic inlet to the pelvic inlet following the oral administration of 30 mL of Omnipaque 350.    COMPARISON:  CT chest  09/18/2015    FINDINGS:  Thoracic soft tissues: No significant abnormality.    Aorta: Normal in course and caliber, without significant atherosclerotic plaque. There are three branching vessels at the arch.    Heart: Normal in size. No pericardial effusion.    Barbara/Mediastinum: No significant lymphadenopathy  the esophagus is mildly dilated and fluid-filled.    Lungs: There is biapical scarring.  In addition, there is chronic changes again noted predominantly in the lung bases where there is cystic change in prominence of the interstitial markings.  In addition there is associated dilation of the small airways in this location.  Multiple scattered foci of tree-in-bud nodularity are noted throughout the lungs (for example in the right upper lobe series 2, image 338).  There is a 4 mm pulmonary nodule in the right upper lobe (series 2, image 14) which was seen on the 2015 examination..    Liver: Normal in size and attenuation, with no focal hepatic lesions.    Gallbladder: No calcified gallstones.    Bile Ducts: No evidence of dilated ducts.    Pancreas: No mass or peripancreatic fat stranding.    Spleen: Unremarkable.    Adrenals: Unremarkable.    Kidneys/ Ureters: Normal in size and location. Normal concentration and excretion of contrast. No hydronephrosis or nephrolithiasis. No ureteral dilatation.    The visualized portion of the GI tract is grossly unremarkable with no evidence of bowel wall thickening or inflammation.    Bones: No acute fracture.      Impression       Stable appearing chronic interstitial lung disease consistent with patient's known history of scleroderma; it is worth noting that there is new tree-in-bud opacity noted in the right upper lobe which while nonspecific can be seen in the setting of inflammatory infectious processes such as aspiration.    Persistently fluid-filled esophagus which can place the patient at risk for aspiration         Results for orders placed during the hospital  encounter of 01/02/19   X-Ray Chest PA And Lateral    Narrative EXAMINATION:  XR CHEST PA AND LATERAL    CLINICAL HISTORY:  Acute bronchitis due to other specified organisms    FINDINGS:  Heart size is normal.  There is pattern of bibasal interstitial fibrosis.  No acute process seen.      Impression See above    Basal interstitial fibrosis.      Electronically signed by: Tejas Morejon MD  Date:    01/02/2019  Time:    14:03     Results for orders placed during the hospital encounter of 09/04/18   DXA Bone Density Spine And Hip    Narrative EXAMINATION:  DEXA BONE DENSITY SPINE HIP    CLINICAL HISTORY:  Disorder of bone, [id number] y/o female with no history of fractures.  She had a Hysterectomy at 40 y/o and Menopausal symptoms at 47 y/o.  She is taking 50,000 once a week once a week.  She has a history of Rheumatoid Arthritis.  She does not exercise or smoke.    TECHNIQUE:  DXA specification: [facility] [employer] [id number].    Bone Mineral Density scanning was performed over the hip and lumbar spine. Review of the images confirms satisfactory positioning and technique.    COMPARISON:  Comparison study done on 01/19/2015. Lumbar spine BMD 0.824 g/cm2 and the T-score -2.0.  The Total Hip BMD 0.823 g/cm2 and the T-score -1.0.    FINDINGS:  Lumbar Spine: Lumbar bone mineral density L1-L4 is 0.823g/cm2, which is a T-score of -2.0. The Z-score is -0.1.    Total Hip: Total hip bone mineral density is 0.763g/cm2.  The T-score is -1.5, and the Z-score is -0.3.    Femoral neck: Bone mineral density is 0.599g/cm2 and the T-score is -2.2 and the Z-score is -0.8 g/cm2.    There is a 11.9% risk of a major osteoporotic fracture and a 2.6% risk of hip fracture in the next 10 years (FRAX).    Compared with previous DXA, BMD at the lumbar spine has remained stable, and the BMD at the total hip has decreased by -7.4%.      Impression Osteopenia of the total hip, femoral neck and lumbar spine.  FRAX calculations do not  suggest treatment.    RECOMMENDATIONS of [facility]:    1.  Calcium 5733-2458 mg daily and vitamin D 800-1000 units daily, adequate exercise.    2.  Recommended therapy    3.  Repeat BMD in 2 years    EXPLANATION OF RESULTS:    The T-score compares these results to the average bone density of a 20 year-old of the same gender. The Z-score compares this result to the average bone density to people of the same age, gender, and race. The amounts indicate the number of standard deviations above or below the mean.    * Osteoporosis is generally defined as having a T-score between less than or equal to -2.5.    * Osteopenia is generally defined as having a T-score between -1.0 and -2.5.    * The normal range is generally defined as having a t-score greater than or equal to -1.0.    * Calculated FRAX scores for fracture risk prediction may not be accurate in the setting of certain clinical factors such as pharmacologic therapy for osteoporosis, prior fragility fractures, high dose glucocorticoid use etc.      Electronically signed by: Rere Olmstead MD  Date:    09/07/2018  Time:    14:18     Cardiodiagnostics:    Assessment:  1. ILD (interstitial lung disease)    2. Scleroderma    3. 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 Raynaud's disease without gangrene    5. Telangiectasia      Plan:   - Patient with significant scleroderma multiorgan involvement  - Her PFTs reveal trend towards decrease in FVC  - A 6MWT is not available at this time  - Patient does have poor exercise function because of pain in the feet when she ambulates.  - She may benefit from starting on Immunosuppression with mycophenolate, although risks and benefit need to be reviewed further since she has had scleroderma since 1983. We initiated and provided the patient with basic information ont he medication. She is going to review with Dr. Ahuja as well.   - Recommend to  repeat PFTs w/DLCO and 6MWT (If patient can tolerate) every 6 months.   - May enroll in Pulmonary rehab once leg ulcers improve.    RTC in 6 months    Attending Note:     I have seen and evaluated the patient with the fellow. Their note reflects the content of our discussion and my plan of care.  Patient presents to establish care for scleroderma associated ILD.      Patient has clear evidence of ILD associated with scleroderma as well as Group I PAH.  It is odd that she has been relatively stable since the 1980's when she was diagnosed.  Her FVC has had a slight decrease over time but her DLCO has remained stable.  We discussed initiation of MMF to help with disease stabilization.  She is somewhat resistant given her stability over time.  We discussed MMF and it's safety profile, risks, and benefits.  Family will plan to review with Dr. Ahuja on their next visit.  We will continue to follow every 6 months with PFTs and 6MWT.  Should she have continued evidence of decline, would then recommend treatment at that time.       Roberta Leigh, DO  Pulmonary/Critical Care Medicine

## 2019-03-18 RX ORDER — TADALAFIL 20 MG/1
40 TABLET ORAL DAILY
Qty: 180 TABLET | Refills: 3 | OUTPATIENT
Start: 2019-03-18

## 2019-03-18 RX ORDER — TADALAFIL 20 MG/1
TABLET ORAL
Qty: 60 TABLET | Refills: 11 | OUTPATIENT
Start: 2019-03-18

## 2019-03-19 ENCOUNTER — PATIENT MESSAGE (OUTPATIENT)
Dept: ADMINISTRATIVE | Facility: OTHER | Age: 69
End: 2019-03-19

## 2019-03-19 ENCOUNTER — LAB VISIT (OUTPATIENT)
Dept: LAB | Facility: HOSPITAL | Age: 69
End: 2019-03-19
Attending: INTERNAL MEDICINE
Payer: MEDICARE

## 2019-03-19 DIAGNOSIS — I27.29 PULMONARY HYPERTENSION ASSOCIATED WITH SYSTEMIC DISORDER: Chronic | ICD-10-CM

## 2019-03-19 DIAGNOSIS — I51.89 DIASTOLIC DYSFUNCTION: ICD-10-CM

## 2019-03-19 LAB
ANION GAP SERPL CALC-SCNC: 7 MMOL/L
BUN SERPL-MCNC: 13 MG/DL
CALCIUM SERPL-MCNC: 9.2 MG/DL
CHLORIDE SERPL-SCNC: 107 MMOL/L
CO2 SERPL-SCNC: 24 MMOL/L
CREAT SERPL-MCNC: 0.8 MG/DL
EST. GFR  (AFRICAN AMERICAN): >60 ML/MIN/1.73 M^2
EST. GFR  (NON AFRICAN AMERICAN): >60 ML/MIN/1.73 M^2
GLUCOSE SERPL-MCNC: 116 MG/DL
POTASSIUM SERPL-SCNC: 4.1 MMOL/L
SODIUM SERPL-SCNC: 138 MMOL/L

## 2019-03-19 PROCEDURE — 36415 COLL VENOUS BLD VENIPUNCTURE: CPT

## 2019-03-19 PROCEDURE — 80048 BASIC METABOLIC PNL TOTAL CA: CPT

## 2019-03-21 ENCOUNTER — PATIENT OUTREACH (OUTPATIENT)
Dept: OTHER | Facility: OTHER | Age: 69
End: 2019-03-21

## 2019-03-21 NOTE — LETTER
March 21, 2019     Yamel Shah  [address] [facility] [address]       Dear Yamel,    Welcome to [facility]! Our goal is to make care effective, proactive and convenient by using data you send us from home to better treat your chronic conditions.          My name is Johnna Carroll, and I am your dedicated Digital Medicine clinician. As an expert in medication management, I will help ensure that the medications you are taking continue to provide the intended benefits and help you reach your goals. You can reach me directly at [phone] or by sending me a message directly through your [facility] account.        I am Melany Salazar and I will be your health . My job is to help you identify lifestyle changes to improve your disease control. We will talk about nutrition, exercise, and other ways you may be able to adjust your current habits to better your health. Additionally, we will help ensure you are completing the tests and screenings that are necessary to help manage your conditions. You can reach me directly at [phone] or by sending me a message directly through your [facility] account.    Most importantly, YOU are at the center of this team. Together, we will work to improve your overall health and encourage you to meet your goals for a healthier lifestyle.     What we expect from YOU:  · Please take frequent home blood pressure measurements. We ask that you take at least 1 blood pressure reading per week, but more information will better help us get you know you. Be sure you rest for a few minutes before taking the reading in a quiet, comfortable place.     Be available to receive phone calls or [facility] messages, when appropriate, from your care team. Please let us know if there are any specific days or times that work best for us to reach you via phone.     Complete routine tests and screenings. Dont worry, we will help keep you on track!           What you  should expect from your Digital Medicine Care Team:   We will work with you to create a personalized plan of care and provide you with encouragement and education, including regarding lifestyle changes, that could help you manage your disease states.     We will adjust your current medications, if needed, and continue to monitor your long-term progress.     We will provide you and your physician with monthly progress reports after you have been in the program for more than 30 days.     We will send you reminders through [facility] and text messages to help ensure you do not miss any testing deadlines to help manage your disease states.    You will be able to reach us by phone or through your [facility] account by clicking our names under Care Team on the right side of the home screen.    I look forward to working with you to achieve your blood pressure goals!    We look forward to working with you to help manage your health,    Sincerely,    Your Digital Medicine Team    Please visit our websites to learn more:   · Hypertension: [email or web address]      Remember, we are not available for emergencies. If you have an emergency, please contact your doctors office directly or call [facility] on[facility]call ([phone] or [phone]) or [phone].

## 2019-03-21 NOTE — PROGRESS NOTES
Digital Medicine Enrollment Call    Introduced Mrs. Yamel Shah to Digital Medicine.     Discussed program expectations and requirements.    Introduced digital medicine care team.     Reviewed the importance of self-monitoring for digital medicine participation.     Reviewed that the Digital Medicine team is not available for emergencies and instructed the patient to call 911 or [facility] ([phone] or [phone]) if one arises.          1st attempt for enrollment call. Left voicemail.         Last 5 Patient Entered Readings                                      Current 30 Day Average: 138/69     Recent Readings 3/20/2019 3/19/2019    SBP (mmHg) 136 140    DBP (mmHg) 67 70    Pulse 79 64

## 2019-03-27 ENCOUNTER — PATIENT OUTREACH (OUTPATIENT)
Dept: OTHER | Facility: OTHER | Age: 69
End: 2019-03-27

## 2019-03-27 NOTE — PROGRESS NOTES
"Last 5 Patient Entered Readings                                      Current 30 Day Average: 137/68     Recent Readings 3/26/2019 3/25/2019 3/24/2019 3/23/2019 3/22/2019    SBP (mmHg) 130 131 145 133 154    DBP (mmHg) 80 66 69 62 68    Pulse 86 70 70 78 73        Patient was taking readings while sitting on bed. Reviewed proper BP technique and importance of adherence to ensure accurate readings.     Digital Medicine: Health  Introduction    Introduced Mrs. Yamel Shah to Digital Medicine. Discussed health  role and recommended lifestyle modifications.    Lifestyle Assessment:  Current Dietary Habits(i.e. low sodium, food labels, dining out): Reports she does not eat "salty foods". Started a plant-based diet last week. Reports eating most meals at home, dining out once weekly, typically [employer]. Seasons meals with pepper or Mediterranean mix, no added salt. Reports limited processed foods. Patient reports she is reading food labels, limiting sodium to 100 mg per serving. Typical meals consist of the following:    Breakfast: oatmeal, boiled eggs, muffin, egg McMuffin without ham, or grits   Lunch: fruit, cottage cheese, tuna in water, cheese and crackers   Dinner: chicken, fish, "a lot" of vegetables   Snacks: nuts, sweet potato chips, unsalted tortilla chips    Beverages: 2 16.9-oz water bottles, 1 cup coffee, tea, milk    Exercise: Patient reports walking, ulcers on feet limit movement. Verbalized goal to lose weight. Will encourage patient to set SMART goal next call.     Alcohol/Tobacco: Wine "occasionally".    Medication Adherence: has been compliant with the medicaiton regimen. Reports forgetting to take BP medication about twice per month.     Reviewed AHA/AACE recommendations:  Limit sodium intake to <2000mg/day  Perform 150 minutes of physical activity per week    Reviewed the importance of self-monitoring, medication adherence, and that the health  can be used as a resource for " lifestyle modifications to help reduce or maintain a healthy lifestyle.  Reviewed that the Digital Medicine team is not available for emergencies and instructed the patient to call 911 or [facility] ([phone] or [phone]) if one arises.

## 2019-03-28 ENCOUNTER — TELEPHONE (OUTPATIENT)
Dept: INTERNAL MEDICINE | Facility: CLINIC | Age: 69
End: 2019-03-28

## 2019-03-29 ENCOUNTER — HOSPITAL ENCOUNTER (OUTPATIENT)
Facility: HOSPITAL | Age: 69
Discharge: HOME | End: 2019-03-29
Attending: INTERNAL MEDICINE | Admitting: INTERNAL MEDICINE
Payer: MEDICARE

## 2019-03-29 ENCOUNTER — ANESTHESIA (OUTPATIENT)
Dept: ENDOSCOPY | Facility: HOSPITAL | Age: 69
End: 2019-03-29
Payer: MEDICARE

## 2019-03-29 ENCOUNTER — ANESTHESIA EVENT (OUTPATIENT)
Dept: ENDOSCOPY | Facility: HOSPITAL | Age: 69
End: 2019-03-29
Payer: MEDICARE

## 2019-03-29 VITALS
BODY MASS INDEX: 24.11 KG/M2 | DIASTOLIC BLOOD PRESSURE: 58 MMHG | HEIGHT: 66 IN | RESPIRATION RATE: 15 BRPM | HEART RATE: 54 BPM | WEIGHT: 150 LBS | OXYGEN SATURATION: 98 % | SYSTOLIC BLOOD PRESSURE: 125 MMHG | TEMPERATURE: 98 F

## 2019-03-29 DIAGNOSIS — D50.9 IRON DEFICIENCY ANEMIA, UNSPECIFIED IRON DEFICIENCY ANEMIA TYPE: Primary | ICD-10-CM

## 2019-03-29 DIAGNOSIS — D50.9 IDA (IRON DEFICIENCY ANEMIA): ICD-10-CM

## 2019-03-29 PROCEDURE — 43239 PR EGD, FLEX, W/BIOPSY, SGL/MULTI: ICD-10-PCS | Mod: 51,,, | Performed by: INTERNAL MEDICINE

## 2019-03-29 PROCEDURE — 45380 COLONOSCOPY AND BIOPSY: CPT | Performed by: INTERNAL MEDICINE

## 2019-03-29 PROCEDURE — 88305 TISSUE EXAM BY PATHOLOGIST: CPT | Performed by: PATHOLOGY

## 2019-03-29 PROCEDURE — 43239 EGD BIOPSY SINGLE/MULTIPLE: CPT | Performed by: INTERNAL MEDICINE

## 2019-03-29 PROCEDURE — D9220A PRA ANESTHESIA: Mod: CRNA,,, | Performed by: NURSE ANESTHETIST, CERTIFIED REGISTERED

## 2019-03-29 PROCEDURE — D9220A PRA ANESTHESIA: ICD-10-PCS | Mod: ANES,,, | Performed by: ANESTHESIOLOGY

## 2019-03-29 PROCEDURE — 45380 COLONOSCOPY AND BIOPSY: CPT | Mod: ,,, | Performed by: INTERNAL MEDICINE

## 2019-03-29 PROCEDURE — 63600175 PHARM REV CODE 636 W HCPCS: Performed by: NURSE ANESTHETIST, CERTIFIED REGISTERED

## 2019-03-29 PROCEDURE — 37000008 HC ANESTHESIA 1ST 15 MINUTES: Performed by: INTERNAL MEDICINE

## 2019-03-29 PROCEDURE — 45380 PR COLONOSCOPY,BIOPSY: ICD-10-PCS | Mod: ,,, | Performed by: INTERNAL MEDICINE

## 2019-03-29 PROCEDURE — D9220A PRA ANESTHESIA: ICD-10-PCS | Mod: CRNA,,, | Performed by: NURSE ANESTHETIST, CERTIFIED REGISTERED

## 2019-03-29 PROCEDURE — 88305 TISSUE EXAM BY PATHOLOGIST: CPT | Mod: 26,,, | Performed by: PATHOLOGY

## 2019-03-29 PROCEDURE — 27201012 HC FORCEPS, HOT/COLD, DISP: Performed by: INTERNAL MEDICINE

## 2019-03-29 PROCEDURE — 88305 TISSUE SPECIMEN TO PATHOLOGY - SURGERY: ICD-10-PCS | Mod: 26,,, | Performed by: PATHOLOGY

## 2019-03-29 PROCEDURE — 37000009 HC ANESTHESIA EA ADD 15 MINS: Performed by: INTERNAL MEDICINE

## 2019-03-29 PROCEDURE — 43239 EGD BIOPSY SINGLE/MULTIPLE: CPT | Mod: 51,,, | Performed by: INTERNAL MEDICINE

## 2019-03-29 PROCEDURE — D9220A PRA ANESTHESIA: Mod: ANES,,, | Performed by: ANESTHESIOLOGY

## 2019-03-29 PROCEDURE — 25000003 PHARM REV CODE 250: Performed by: INTERNAL MEDICINE

## 2019-03-29 RX ORDER — HYDROMORPHONE HYDROCHLORIDE 1 MG/ML
0.2 INJECTION, SOLUTION INTRAMUSCULAR; INTRAVENOUS; SUBCUTANEOUS EVERY 5 MIN PRN
Status: DISCONTINUED | OUTPATIENT
Start: 2019-03-29 | End: 2019-03-29 | Stop reason: HOSPADM

## 2019-03-29 RX ORDER — LIDOCAINE HCL/PF 100 MG/5ML
SYRINGE (ML) INTRAVENOUS
Status: DISCONTINUED | OUTPATIENT
Start: 2019-03-29 | End: 2019-03-29

## 2019-03-29 RX ORDER — ONDANSETRON 2 MG/ML
4 INJECTION INTRAMUSCULAR; INTRAVENOUS ONCE AS NEEDED
Status: DISCONTINUED | OUTPATIENT
Start: 2019-03-29 | End: 2019-03-29 | Stop reason: HOSPADM

## 2019-03-29 RX ORDER — PROPOFOL 10 MG/ML
VIAL (ML) INTRAVENOUS
Status: DISCONTINUED | OUTPATIENT
Start: 2019-03-29 | End: 2019-03-29

## 2019-03-29 RX ORDER — PROPOFOL 10 MG/ML
VIAL (ML) INTRAVENOUS CONTINUOUS PRN
Status: DISCONTINUED | OUTPATIENT
Start: 2019-03-29 | End: 2019-03-29

## 2019-03-29 RX ORDER — SODIUM CHLORIDE 9 MG/ML
INJECTION, SOLUTION INTRAVENOUS CONTINUOUS
Status: DISCONTINUED | OUTPATIENT
Start: 2019-03-29 | End: 2019-03-29 | Stop reason: HOSPADM

## 2019-03-29 RX ORDER — FENTANYL CITRATE 50 UG/ML
25 INJECTION, SOLUTION INTRAMUSCULAR; INTRAVENOUS EVERY 5 MIN PRN
Status: DISCONTINUED | OUTPATIENT
Start: 2019-03-29 | End: 2019-03-29 | Stop reason: HOSPADM

## 2019-03-29 RX ORDER — SODIUM CHLORIDE 0.9 % (FLUSH) 0.9 %
10 SYRINGE (ML) INJECTION
Status: DISCONTINUED | OUTPATIENT
Start: 2019-03-29 | End: 2019-03-29 | Stop reason: HOSPADM

## 2019-03-29 RX ORDER — MEPERIDINE HYDROCHLORIDE 25 MG/ML
12.5 INJECTION INTRAMUSCULAR; INTRAVENOUS; SUBCUTANEOUS ONCE AS NEEDED
Status: DISCONTINUED | OUTPATIENT
Start: 2019-03-29 | End: 2019-03-29 | Stop reason: HOSPADM

## 2019-03-29 RX ORDER — DIPHENHYDRAMINE HYDROCHLORIDE 50 MG/ML
25 INJECTION INTRAMUSCULAR; INTRAVENOUS EVERY 6 HOURS PRN
Status: DISCONTINUED | OUTPATIENT
Start: 2019-03-29 | End: 2019-03-29 | Stop reason: HOSPADM

## 2019-03-29 RX ADMIN — PROPOFOL 30 MG: 10 INJECTION, EMULSION INTRAVENOUS at 10:03

## 2019-03-29 RX ADMIN — LIDOCAINE HYDROCHLORIDE 50 MG: 20 INJECTION, SOLUTION INTRAVENOUS at 09:03

## 2019-03-29 RX ADMIN — SODIUM CHLORIDE: 0.9 INJECTION, SOLUTION INTRAVENOUS at 09:03

## 2019-03-29 RX ADMIN — PROPOFOL 80 MG: 10 INJECTION, EMULSION INTRAVENOUS at 09:03

## 2019-03-29 RX ADMIN — LIDOCAINE HYDROCHLORIDE 100 MG: 20 INJECTION, SOLUTION INTRAVENOUS at 10:03

## 2019-03-29 RX ADMIN — PROPOFOL 150 MCG/KG/MIN: 10 INJECTION, EMULSION INTRAVENOUS at 09:03

## 2019-03-29 NOTE — TRANSFER OF CARE
"Anesthesia Transfer of Care Note    Patient: Yamel Shah    Procedure(s) Performed: Procedure(s) (LRB):  EGD (ESOPHAGOGASTRODUODENOSCOPY) (N/A)  COLONOSCOPY (N/A)    Patient location: PACU    Anesthesia Type: general    Transport from OR: Transported from OR on room air with adequate spontaneous ventilation    Post pain: adequate analgesia    Post assessment: no apparent anesthetic complications    Post vital signs: stable    Level of consciousness: awake    Nausea/Vomiting: no nausea/vomiting    Complications: none    Transfer of care protocol was followed      Last vitals:   Visit Vitals  /66 (BP Location: Left arm, Patient Position: Lying)   Pulse 74   Temp 37.1 °C (98.8 °F) (Oral)   Resp 18   Ht 5' 6" (1.676 m)   Wt 68 kg (150 lb)   SpO2 99%   Breastfeeding? No   BMI 24.21 kg/m²     "

## 2019-03-29 NOTE — PLAN OF CARE
Patient states they are ready to be discharged. Instructions given to patient and family. Both verbalize understanding. Patient tolerating po liquids with no difficulty. Patient states pain is at a tolerable level for them. Anesthesia consent and surgical consent in chart upon patient's discharge from [facility].

## 2019-03-29 NOTE — [employer]ATION PATIENT INSTRUCTIONS
Discharge Summary/Instructions after an Endoscopic Procedure  Patient Name: Yamel Shah  Patient MRN: [id number]  Patient YOB: 1950 Friday, March 29, 2019  Annamaria Mendoza MD  RESTRICTIONS:  During your procedure today, you received medications for sedation.  These   medications may affect your judgment, balance and coordination.  Therefore,   for 24 hours, you have the following restrictions:   - DO NOT drive a car, operate machinery, make legal/financial decisions,   sign important papers or drink alcohol.    ACTIVITY:  Today: no heavy lifting, straining or running due to procedural   sedation/anesthesia.  The following day: return to full activity including work.  DIET:  Eat and drink normally unless instructed otherwise.     TREATMENT FOR COMMON SIDE EFFECTS:  - Mild abdominal pain, nausea, belching, bloating or excessive gas:  rest,   eat lightly and use a heating pad.  - Sore Throat: treat with throat lozenges and/or gargle with warm salt   water.  - Because air was used during the procedure, expelling large amounts of air   from your rectum or belching is normal.  - If a bowel prep was taken, you may not have a bowel movement for 1-3 days.    This is normal.  SYMPTOMS TO WATCH FOR AND REPORT TO YOUR PHYSICIAN:  1. Abdominal pain or bloating, other than gas cramps.  2. Chest pain.  3. Back pain.  4. Signs of infection such as: chills or fever occurring within 24 hours   after the procedure.  5. Rectal bleeding, which would show as bright red, maroon, or black stools.   (A tablespoon of blood from the rectum is not serious, especially if   hemorrhoids are present.)  6. Vomiting.  7. Weakness or dizziness.  GO DIRECTLY TO THE NEAREST EMERGENCY ROOM IF YOU HAVE ANY OF THE FOLLOWING:      Difficulty breathing              Chills and/or fever over 101 F   Persistent vomiting and/or vomiting blood   Severe abdominal pain   Severe chest pain   Black, tarry stools   Bleeding- more than one  tablespoon   Any other symptom or condition that you feel may need urgent attention  Your doctor recommends these additional instructions:  If any biopsies were taken, your doctors clinic will contact you in 1 to 2   weeks with any results.  - Discharge patient to home (with escort).   - Resume previous diet.   - Continue present medications.   - Repeat colonoscopy in 3 years for surveillance.   - The findings and recommendations were discussed with the patient.   - Patient has a contact number available for emergencies.  The signs and   symptoms of potential delayed complications were discussed with the   patient.  Return to normal activities tomorrow.  Written discharge   instructions were provided to the patient.   - Follow up with Jeannette stahl NP.    - Check CBC and iron studies.  Check VCE if evidence of ROXANNE.  For questions, problems or results please call your physician - Annamaria Mendoza MD at Work:  [phone].  [facility], EMERGENCY ROOM PHONE NUMBER: [phone]  IF A COMPLICATION OR EMERGENCY SITUATION ARISES AND YOU ARE UNABLE TO REACH   YOUR PHYSICIAN - GO DIRECTLY TO THE EMERGENCY ROOM.  Annamaria Mendoza MD  3/29/2019 10:46:41 AM  This report has been verified and signed electronically.  [employer]

## 2019-03-29 NOTE — ANESTHESIA PREPROCEDURE EVALUATION
Difficult airway predicted                                                                                                                   03/29/2019  Yamel Shah is a 68 y.o., female.    Anesthesia Evaluation         Review of Systems  Anesthesia Hx:  No problems with previous Anesthesia   Social:  Non-Smoker    Cardiovascular:   Exercise tolerance: poor Hypertension Denies CAD.     Denies Angina. NEW  Functional Capacity Can you climb two flights of stairs? ==> Yes    Pulmonary:   Asthma Denies Recent URI.  Denies Sleep Apnea.             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 Denies Hiatal Hernia. GERD, well controlled Denies Liver Disease.  Denies Hepatitis.    Musculoskeletal:               Scleroderma               Neurological:   Denies CVA. Denies Seizures.    Endocrine:   Denies Diabetes. Denies Hypothyroidism.        Physical Exam  General:  Well nourished    Airway/Jaw/Neck:  Airway Findings: Mouth Opening: < 3 cm Tongue: Normal  General Airway Assessment: Adult, Possible difficult mask airway, Possible difficult intubation  Mallampati: IV  Improves to III with phonation.  TM Distance: < 4 cm  Jaw/Neck Findings:  Neck ROM: Extension Decreased, Mild  Neck Findings:              Scleroderma               Eyes/Ears/Nose:  EYES/EARS/NOSE FINDINGS: Normal   Dental:  Dental Findings: In tact   Chest/Lungs:  Chest/Lungs Findings: Clear to auscultation     Heart/Vascular:  Heart Findings: Rate: Normal  Rhythm: Regular Rhythm  Sounds: Normal        Mental Status:  Mental Status Findings:  Alert and Oriented         Anesthesia Plan  Type of Anesthesia, risks & benefits discussed:  Anesthesia Type:  general  Patient's Preference: Proceed with anesthesia understanding that the risks are very small but could be serious or life threatening.  Intra-op Monitoring Plan: standard ASA monitors  Intra-op Monitoring Plan Comments:   Post Op Pain  PHYSICAL THERAPY Evaluation NOTE  Patient Name: Poncho Ozuna  TQVBN'R Date: 3/15/2019     AGE:   81 y o  Mrn:   [id number]  ADMIT DX:  UTI (urinary tract infection) [N39 0]  Weakness [R53 1]  Elevated troponin [R74 8]    Past Medical History:   Diagnosis Date    [facility] pectoris ([facility] )     Chronic kidney disease     Coronary artery disease     Diabetes mellitus ([facility] )     Glaucoma     Hypertension     Multiple myeloma ([facility] )     Occluded coronary artery stent     Left     Length Of Stay: 2  PHYSICAL THERAPY EVALUATION :    03/15/19 1009   Note Type   Note type Eval only   Pain Assessment   Pain Assessment No/denies pain   Home Living   Type of Home House; Other (Comment)  (+ LUIS)   Home Layout Two level;Bed/bath upstairs;1/2 bath on main level; Laundry in basement   [employer] Grab bars in shower; Shower chair   Bathroom Accessibility Accessible; Other (Comment)  (primary bathroom up flight of stairs)   Home Equipment Cane;Grab bars   Additional Comments Pt reports living alone in 2   Pt reports having L handrail on steps when ascending and grasp rail w/ B UE   Prior Function   Level of [place] Independent with ADLs and functional mobility   Lives With Alone   Receives Help From Family; Other (Comment)  (supportive son, niece/ nephew)   ADL Assistance Independent   IADLs Needs assistance  (-)   Falls in the last 6 months 1 to 4  (pt reports 1 fall on ice)   Vocational Retired   Comments Pt reports no AD for functional mobility at baseline, but uses furniture / walls for support  Pt does not drive, and family assist w/ shopping, meal prep  Pt reports doing laundry in basement   Restrictions/Precautions   Weight Bearing Precautions Per Order No   Other Precautions Chair Alarm; Bed Alarm; Fall Risk;Telemetry   General   Additional Pertinent History Pt  Control Plan:   Post Op Pain Control Plan Comments:   Induction:   IV  Beta Blocker:  Patient is not currently on a Beta-Blocker (No further documentation required).       Informed Consent: Patient understands risks and agrees with Anesthesia plan.  Questions answered. Anesthesia consent signed with patient.  ASA Score: 3     Day of Surgery Review of History & Physical: I have interviewed and examined the patient. I have reviewed the patient's H&P dated:            Ready For Surgery From Anesthesia Perspective.             Difficult airway predicted               is an 80 yo M who presents with SOB  Pt  reports over the last week has become extremely dyspneic on exertion, reporting that minimal activity makes him extremely SOB  Dx: Acute on Chronic Combined CHF, comorbidities include DM2, HLD, CKD4, Anemia, Kappa light chain myeloma, UTI, generalized weakness, cardiomyopathy, MRSA, cough, and dizziness  Family/Caregiver Present No   Cognition   Overall Cognitive Status WFL   Attention Attends with cues to redirect   Orientation Level Oriented to person;Oriented to place;Oriented to time;Oriented to situation  (able to report month , year w/ + time)   Memory Decreased recall of recent events  (increaesd time)   Following Commands Follows multistep commands with increased time or repetition   Comments Identified pt by full name and birthdate  Pt able to participate in conversation w/ increased time  Able to provide social history w/ increased time for consistent recall  Alert, cooperative and generally oriented  Pt   RLE Assessment   RLE Assessment   (grossly 4-/5)   LLE Assessment   LLE Assessment   (grossly 4-/5)   Coordination   Movements are Fluid and Coordinated 1   Sensation WFL   Light Touch   RLE Light Touch Grossly intact   LLE Light Touch Grossly intact   Bed Mobility   Additional Comments Pt  seated OOB in chair prior to PT session   Transfers   Sit to Stand 5  Supervision   Additional items Assist x 1; Armrests; Increased time required   Stand to Sit 5  Supervision   Additional items Assist x 1; Armrests; Increased time required   Ambulation/Elevation   Gait pattern Poor UE support;Narrow DUNG; Forward Flexion;Decreased foot clearance;Shuffling; Short stride;Redundant gait at times; Excessively slow   Gait Assistance 4  Minimal assist  (CGA)   Additional items Assist x 1;Verbal cues  (for AD management and sequencign)   Assistive Device Rolling walker   Distance 150'x1   Stair Management Assistance 4  Minimal assist   Additional items Assist x 1   Stair Management Technique One rail L;Step to pattern   Number of Stairs 15  (required seated rest break following steps; BP-115/54 w/ SOB)   Balance   Static Sitting Good   Dynamic Sitting Fair   Static Standing Fair -   Dynamic Standing Poor +   Ambulatory Poor +   Activity Tolerance   Activity Tolerance Patient limited by fatigue   Medical Staff Made Aware Spoke to Susana Agudelo OT    Nurse Made Aware Spoke to RNKrissy   Assessment   Prognosis Good   Problem List Decreased strength;Decreased range of motion;Decreased endurance; Impaired balance;Decreased mobility   Assessment Pt  is an 80 yo M who presents with SOB  Pt  reports over the last week has become extremely dyspneic on exertion, reporting that minimal activity makes him extremely SOB  Dx: Acute on Chronic Combined CHF, order placed for PT eval and tx, w/ activity order of up as tolerated  pt presents w/ comorbidities of DM2, HLD, CKD4, Anemia, Kappa light chain myeloma, UTI, generalized weakness, cardiomyopathy, MRSA, cough, and dizziness  and personal factors of advanced age, living in 2 story house, mobilizing w/ assistive device, stair(s) to enter home, inability to navigate community distances, inability to navigate level surfaces w/o external assistance, unable to perform dynamic tasks in community, positive fall history, inability to perform IADLs and inability to live alone  pt presents w/ weakness, decreased ROM, decreased endurance, impaired cognition, impaired balance, gait deviations, decreased safety awareness and fall risk  these impairments are evident in findings from physical examination (weakness and edema of extremities), mobility assessment (need for Min assist w/ all phases of mobility when usually mobilizing independently, tolerance to only 150 feet of ambulation and need for cueing for mobility technique), and Barthel Index: 70/100  pt needed input for task focus and mobility technique   pt is at risk for falls due to physical and safety awareness deficits  pt's clinical presentation is unstable/unpredictable (evident in need for assist w/ all phases of mobility when usually mobilizing independently, tolerance to only 150 feet of ambulation, need for input for mobility technique, need for input for task focus and mobility technique and need for input for mobility technique/safety)  pt needs inpatient PT tx to improve mobility deficits  discharge recommendation is for inpatient rehab to reduce fall risk and maximize level of functional independence  Goals   Patient Goals to return to his home   STG Expiration Date 03/25/19   Short Term Goal #1 pt will: Increase bilateral LE strength 1/2 grade to facilitate independent mobility, Perform all bed mobility tasks independently to decrease fall risk factors, Perform all transfers modified independent to improve independence, Ambulate 350 ft  with least restrictive assistive device modified independent w/o LOB, Navigate 15 stairs w/ supervision with unilateral handrail to facilitate return to previous living environment, Increase all balance 1/2 grade to decrease risk for falls and Improve Barthel Index score to 95 or greater to facilitate independence   Treatment Day 0   Plan   Treatment/Interventions Functional transfer training;LE strengthening/ROM; Elevations; Therapeutic exercise; Endurance training;Equipment eval/education;Patient/family training;Bed mobility;Gait training;Spoke to nursing;Spoke to case management;OT   PT Frequency 5x/wk   Recommendation   Recommendation Short-term skilled PT   Equipment Recommended Walker   PT - OK to Discharge Yes   Additional Comments to rehab when medically appropriate   Barthel Index   Feeding 10   Bathing 0   Grooming Score 5   Dressing Score 5   Bladder Score 10   Bowels Score 10   Toilet Use Score 5   Transfers (Bed/Chair) Score 10   Mobility (Level Surface) Score 10   Stairs Score 5   Barthel Index Score 70     Skilled PT recommended while in hospital and upon DC to progress pt toward treatment goals       Tami Alcantara, PT 3/15/2019

## 2019-03-29 NOTE — PROVATION PATIENT INSTRUCTIONS
Discharge Summary/Instructions after an Endoscopic Procedure  Patient Name: Yamel Shah  Patient MRN: [id number]  Patient YOB: 1950 Friday, March 29, 2019  Annamaria Mendoza MD  RESTRICTIONS:  During your procedure today, you received medications for sedation.  These   medications may affect your judgment, balance and coordination.  Therefore,   for 24 hours, you have the following restrictions:   - DO NOT drive a car, operate machinery, make legal/financial decisions,   sign important papers or drink alcohol.    ACTIVITY:  Today: no heavy lifting, straining or running due to procedural   sedation/anesthesia.  The following day: return to full activity including work.  DIET:  Eat and drink normally unless instructed otherwise.     TREATMENT FOR COMMON SIDE EFFECTS:  - Mild abdominal pain, nausea, belching, bloating or excessive gas:  rest,   eat lightly and use a heating pad.  - Sore Throat: treat with throat lozenges and/or gargle with warm salt   water.  - Because air was used during the procedure, expelling large amounts of air   from your rectum or belching is normal.  - If a bowel prep was taken, you may not have a bowel movement for 1-3 days.    This is normal.  SYMPTOMS TO WATCH FOR AND REPORT TO YOUR PHYSICIAN:  1. Abdominal pain or bloating, other than gas cramps.  2. Chest pain.  3. Back pain.  4. Signs of infection such as: chills or fever occurring within 24 hours   after the procedure.  5. Rectal bleeding, which would show as bright red, maroon, or black stools.   (A tablespoon of blood from the rectum is not serious, especially if   hemorrhoids are present.)  6. Vomiting.  7. Weakness or dizziness.  GO DIRECTLY TO THE NEAREST EMERGENCY ROOM IF YOU HAVE ANY OF THE FOLLOWING:      Difficulty breathing              Chills and/or fever over 101 F   Persistent vomiting and/or vomiting blood   Severe abdominal pain   Severe chest pain   Black, tarry stools   Bleeding- more than one  tablespoon   Any other symptom or condition that you feel may need urgent attention  Your doctor recommends these additional instructions:  If any biopsies were taken, your doctors clinic will contact you in 1 to 2   weeks with any results.  - Await pathology results.   - Discharge patient to home (with escort).   - Resume previous diet.   - Continue present medications.   - Return to my office as previously scheduled.   - The findings and recommendations were discussed with the patient.   - Patient has a contact number available for emergencies.  The signs and   symptoms of potential delayed complications were discussed with the   patient.  Return to normal activities tomorrow.  Written discharge   instructions were provided to the patient.  For questions, problems or results please call your physician - Annamaria Mendoza MD at Work:  [phone].  [facility], EMERGENCY ROOM PHONE NUMBER: [phone]  IF A COMPLICATION OR EMERGENCY SITUATION ARISES AND YOU ARE UNABLE TO REACH   YOUR PHYSICIAN - GO DIRECTLY TO THE EMERGENCY ROOM.  Annamaria Mendoza MD  3/29/2019 10:04:18 AM  This report has been verified and signed electronically.  [employer]ATION

## 2019-03-29 NOTE — ANESTHESIA POSTPROCEDURE EVALUATION
Anesthesia Post Evaluation    Patient: Yamel Shah    Procedure(s) Performed: Procedure(s) (LRB):  EGD (ESOPHAGOGASTRODUODENOSCOPY) (N/A)  COLONOSCOPY (N/A)    Final Anesthesia Type: general  Patient location during evaluation: PACU  Patient participation: Yes- Able to Participate  Level of consciousness: awake and alert  Post-procedure vital signs: reviewed and stable  Pain management: adequate  Airway patency: patent  PONV status at discharge: No PONV  Anesthetic complications: no      Cardiovascular status: blood pressure returned to baseline  Respiratory status: unassisted  Hydration status: euvolemic  Follow-up not needed.          Vitals Value Taken Time   /58 3/29/2019 12:17 PM   Temp 36.8 °C (98.2 °F) 3/29/2019 12:15 PM   Pulse 57 3/29/2019 12:20 PM   Resp 15 3/29/2019 12:15 PM   SpO2 100 % 3/29/2019 12:20 PM   Vitals shown include unvalidated device data.      No case tracking events are documented in the log.      Pain/Lory Score: Lory Score: 10 (3/29/2019 10:55 AM)

## 2019-03-29 NOTE — H&P
Short Stay Endoscopy History and Physical    PCP - Aly Rand MD    Procedure - Colonoscopy/EGD  ASA - per anesthesia  Mallampati - per anesthesia  History of Anesthesia problems - no  Family history Anesthesia problems - no   Plan of anesthesia - General    HPI:  This is a 68 y.o. female here for evaluation of ROXANNE, dysphagia:      ROS:  Constitutional: No fevers, chills, No weight loss  CV: No chest pain  Pulm: No cough, No shortness of breath  GI: see HPI  Derm: No rash    Medical History:  has a past medical history of Abnormal Pap smear, Acid reflux, Allergy, Arthritis, GERD (gastroesophageal reflux disease), History of migraine headaches, Hypertension, Idiopathic neuropathy (7/20/2012), ILD (interstitial lung disease) (11/6/2013), Iron deficiency anemia (3/18/2014), MRSA carrier, Osteopenia, Pulmonary fibrosis, Pulmonary hypertension, Raynaud's disease, Scleroderma, diffuse, Sjogren's syndrome, and Vitamin D deficiency (11/14/2013).    Surgical History:  has a past surgical history that includes Dilation and curettage of uterus; Breast biopsy; Cervical conization w/ laser (1970); Hysterectomy (1990); Esophagogastroduodenoscopy; and Colonoscopy.    Family History: family history includes Breast cancer in her maternal aunt, mother, and sister; No Known Problems in her daughter, daughter, son, and son; Osteoarthritis in her brother.. Otherwise no colon cancer, inflammatory bowel disease, or GI malignancies.    Social History:  reports that she has never smoked. She has never used smokeless tobacco. She reports that she does not drink alcohol or use drugs.    Review of patient's allergies indicates:   Allergen Reactions    Sulfa (sulfonamide antibiotics)      Other reaction(s): Rash       Medications:   Medications Prior to Admission   Medication Sig Dispense Refill Last Dose    ADCIRCA 20 mg Tab TAKE TWO  TABLETS (40MG) BY MOUTH ONCE DAILY. CALL [phone] TO REFILL. 60 tablet 11 3/28/2019 at Unknown  time    albuterol (VENTOLIN HFA) 90 mcg/actuation inhaler Inhale 2 puffs into the lungs every 4 (four) hours as needed for Wheezing. Rescue 18 g 1 Past Week at Unknown time    b complex vitamins tablet Take 1 tablet by mouth once daily.   Past Week at Unknown time    ergocalciferol (ERGOCALCIFEROL) 50,000 unit Cap TAKE 1 CAPSULE BY MOUTH EVERY 7 DAYS 12 capsule 1 Past Week at Unknown time    ferrous sulfate 325 (65 FE) MG EC tablet Take 1 tablet (325 mg total) by mouth 3 (three) times daily. (Patient taking differently: Take 325 mg by mouth once daily. )  0 Past Week at Unknown time    multivitamin capsule Take 1 capsule by mouth once daily.    Past Week at Unknown time    NIFEdipine (ADALAT CC) 90 MG TbSR TAKE 1 TABLET(90 MG) BY MOUTH EVERY DAY 90 tablet 3 3/28/2019 at Unknown time    omeprazole (PRILOSEC) 40 MG capsule Take 1 capsule (40 mg total) by mouth 2 (two) times daily before meals. (Patient taking differently: Take 40 mg by mouth every morning. ) 180 capsule 3 3/29/2019 at Unknown time    pravastatin (PRAVACHOL) 20 MG tablet Take 1 tablet (20 mg total) by mouth every evening. 90 tablet 3 3/28/2019 at Unknown time    pregabalin (LYRICA) 150 MG capsule Take 1 capsule (150 mg total) by mouth 3 (three) times daily. (Patient taking differently: Take 150 mg by mouth once daily. ) 90 capsule 6 3/28/2019 at Unknown time    ranitidine (ZANTAC) 150 MG capsule TAKE 1 CAPSULE(150 MG) BY MOUTH TWICE DAILY (Patient taking differently: TAKE 1 CAPSULE(150 MG) BY MOUTH DAILY EVENINGS) 60 capsule 0 3/28/2019 at Unknown time    spironolactone (ALDACTONE) 25 MG tablet Take 1 tablet (25 mg total) by mouth once daily. 30 tablet 11 3/28/2019 at Unknown time         Physical Exam:    Vital Signs:   Vitals:    03/29/19 0834   BP: 129/66   Pulse: 74   Resp: 18   Temp: 98.8 °F (37.1 °C)       General Appearance: Well appearing in no acute distress  Eyes:    No scleral icterus  Lungs: CTA bilaterally  Heart:  S1, S2  normal, no murmurs heard  Abdomen: Soft, non tender, non distended with positive bowel sounds. No hepatosplenomegaly, ascites, or mass.  Extremities: 2+ pulses, no clubbing, cyanosis or edema  Skin: No rash      Labs:  Lab Results   Component Value Date    WBC 5.04 03/12/2019    HGB 13.1 03/12/2019    HCT 39.8 03/12/2019     03/12/2019    CHOL 115 (L) 09/06/2018    TRIG 49 09/06/2018    HDL 43 09/06/2018    ALT 11 03/12/2019    AST 20 03/12/2019     03/19/2019    K 4.1 03/19/2019     03/19/2019    CREATININE 0.8 03/19/2019    BUN 13 03/19/2019    CO2 24 03/19/2019    TSH 1.287 11/25/2015    INR 1.0 06/29/2015       I have explained the risks and benefits of endoscopy procedures to the patient including but not limited to bleeding, perforation, infection, and death.      Annamaria Mendoza MD

## 2019-04-01 ENCOUNTER — TELEPHONE (OUTPATIENT)
Dept: ENDOSCOPY | Facility: HOSPITAL | Age: 69
End: 2019-04-01

## 2019-04-05 ENCOUNTER — TELEPHONE (OUTPATIENT)
Dept: GASTROENTEROLOGY | Facility: CLINIC | Age: 69
End: 2019-04-05

## 2019-04-10 ENCOUNTER — TELEPHONE (OUTPATIENT)
Dept: VASCULAR SURGERY | Facility: CLINIC | Age: 69
End: 2019-04-10

## 2019-04-10 ENCOUNTER — LAB VISIT (OUTPATIENT)
Dept: LAB | Facility: HOSPITAL | Age: 69
End: 2019-04-10
Payer: MEDICARE

## 2019-04-10 ENCOUNTER — OFFICE VISIT (OUTPATIENT)
Dept: GASTROENTEROLOGY | Facility: CLINIC | Age: 69
End: 2019-04-10
Payer: MEDICARE

## 2019-04-10 VITALS
HEIGHT: 66 IN | SYSTOLIC BLOOD PRESSURE: 118 MMHG | WEIGHT: 160.5 LBS | DIASTOLIC BLOOD PRESSURE: 65 MMHG | HEART RATE: 66 BPM | BODY MASS INDEX: 25.79 KG/M2

## 2019-04-10 DIAGNOSIS — D50.9 IRON DEFICIENCY ANEMIA, UNSPECIFIED IRON DEFICIENCY ANEMIA TYPE: ICD-10-CM

## 2019-04-10 DIAGNOSIS — D50.9 IRON DEFICIENCY ANEMIA, UNSPECIFIED IRON DEFICIENCY ANEMIA TYPE: Primary | ICD-10-CM

## 2019-04-10 DIAGNOSIS — K21.9 GASTROESOPHAGEAL REFLUX DISEASE, ESOPHAGITIS PRESENCE NOT SPECIFIED: ICD-10-CM

## 2019-04-10 DIAGNOSIS — R13.19 ESOPHAGEAL DYSPHAGIA: ICD-10-CM

## 2019-04-10 LAB
BASOPHILS # BLD AUTO: 0.03 K/UL (ref 0–0.2)
BASOPHILS NFR BLD: 0.6 % (ref 0–1.9)
DIFFERENTIAL METHOD: ABNORMAL
EOSINOPHIL # BLD AUTO: 0 K/UL (ref 0–0.5)
EOSINOPHIL NFR BLD: 0.6 % (ref 0–8)
ERYTHROCYTE [DISTWIDTH] IN BLOOD BY AUTOMATED COUNT: 15.9 % (ref 11.5–14.5)
FERRITIN SERPL-MCNC: 25 NG/ML (ref 20–300)
HCT VFR BLD AUTO: 40.5 % (ref 37–48.5)
HGB BLD-MCNC: 13.4 G/DL (ref 12–16)
IMM GRANULOCYTES # BLD AUTO: 0.01 K/UL (ref 0–0.04)
IMM GRANULOCYTES NFR BLD AUTO: 0.2 % (ref 0–0.5)
IRON SERPL-MCNC: 43 UG/DL (ref 30–160)
LYMPHOCYTES # BLD AUTO: 1.4 K/UL (ref 1–4.8)
LYMPHOCYTES NFR BLD: 26.3 % (ref 18–48)
MCH RBC QN AUTO: 31.2 PG (ref 27–31)
MCHC RBC AUTO-ENTMCNC: 33.1 G/DL (ref 32–36)
MCV RBC AUTO: 94 FL (ref 82–98)
MONOCYTES # BLD AUTO: 0.4 K/UL (ref 0.3–1)
MONOCYTES NFR BLD: 7.9 % (ref 4–15)
NEUTROPHILS # BLD AUTO: 3.3 K/UL (ref 1.8–7.7)
NEUTROPHILS NFR BLD: 64.4 % (ref 38–73)
NRBC BLD-RTO: 0 /100 WBC
PLATELET # BLD AUTO: 195 K/UL (ref 150–350)
PMV BLD AUTO: 12 FL (ref 9.2–12.9)
RBC # BLD AUTO: 4.3 M/UL (ref 4–5.4)
SATURATED IRON: 12 % (ref 20–50)
TOTAL IRON BINDING CAPACITY: 354 UG/DL (ref 250–450)
TRANSFERRIN SERPL-MCNC: 239 MG/DL (ref 200–375)
WBC # BLD AUTO: 5.18 K/UL (ref 3.9–12.7)

## 2019-04-10 PROCEDURE — 99213 OFFICE O/P EST LOW 20 MIN: CPT | Mod: S$PBB,,, | Performed by: NURSE PRACTITIONER

## 2019-04-10 PROCEDURE — 99999 PR PBB SHADOW E&M-EST. PATIENT-LVL IV: CPT | Mod: PBBFAC,,, | Performed by: NURSE PRACTITIONER

## 2019-04-10 PROCEDURE — 99213 PR OFFICE/OUTPT VISIT, EST, LEVL III, 20-29 MIN: ICD-10-PCS | Mod: S$PBB,,, | Performed by: NURSE PRACTITIONER

## 2019-04-10 PROCEDURE — 85025 COMPLETE CBC W/AUTO DIFF WBC: CPT

## 2019-04-10 PROCEDURE — 83540 ASSAY OF IRON: CPT

## 2019-04-10 PROCEDURE — 36415 COLL VENOUS BLD VENIPUNCTURE: CPT

## 2019-04-10 PROCEDURE — 99214 OFFICE O/P EST MOD 30 MIN: CPT | Mod: PBBFAC | Performed by: NURSE PRACTITIONER

## 2019-04-10 PROCEDURE — 82728 ASSAY OF FERRITIN: CPT

## 2019-04-10 PROCEDURE — 99999 PR PBB SHADOW E&M-EST. PATIENT-LVL IV: ICD-10-PCS | Mod: PBBFAC,,, | Performed by: NURSE PRACTITIONER

## 2019-04-10 NOTE — TELEPHONE ENCOUNTER
Spoke to Mrs Shah herself and informed her of an appt change in the schedule. She agreed to the new  appt time and a letter was mailed out.

## 2019-04-10 NOTE — PROGRESS NOTES
"    [facility] Established Patient Visit    Reason for Visit:  The primary encounter diagnosis was Iron deficiency anemia, unspecified iron deficiency anemia type. Diagnoses of Gastroesophageal reflux disease, esophagitis presence not specified and Esophageal dysphagia were also pertinent to this visit.    PCP: Aly Rand    HPI:This is a 67 y.o. female here for f/u of anemia, gerd,  and dysphagia. She has a hx of esophageal candida, and GEJ polyps.  She has a hx notable for scleroderma, ILD, PHTN.    Anemia- improved labs. On PO iron once daily  Onset-chronic. Admitted to the hospital from 2/21-2/22/18 with fatigue and drop in hgb/hcrt to 6.4/22.5. She received 2 u of PRBC. GI was consulted and advised out pt EGD/colon for further eval, but pt has not had as previously advised.  SOB-some SOB since being told she has fluid "around heart". Has started aldactone for this  CP-no  Increased fatigue- improving fatigue  Syncope/near syncope-none  Dizziness/ light headedness-none  PICA-was craving ice prior to blood transfusion, none since  GI Bleeding-none. Dark stools s/p PO iron initially, none in a while. Takes PO iron once daily   Previous studies and /or eval per GI-EGD 2017 with GEJ polyp. EGD/[id number], EGD with polyps. Previous EGD with GAVE. EGD/colon 3/29/ 2019 with dilated esophagus with benign appearing stricture, benign gastric polyps, a 3 cm hiatal hernia and 5 small transverse colon TAs.     Lab Results   Component Value Date    IRON 45 09/06/2018    TIBC 339 09/06/2018    FERRITIN 31 09/06/2018     Lab Results   Component Value Date    WBC 5.04 03/12/2019    HGB 13.1 03/12/2019    HCT 39.8 03/12/2019    MCV 93 03/12/2019     03/12/2019     Abdominal pain-denies  Reflux - + hx GERD. Currently well controlled  Takes omeprazole 40 mg once daily 1 hour b/f breakfast x multiple years. She also takes Zantac 150 mg BID  Dysphagia/odynophagia - +hx dysphagia.  Improving since changing to " plant based diet (less meat, more fruits). Lately occurring twice monthly. Solids get stuck in mid esophagus. Some improvement if she chews thoroughly. No problems with liquids. materials eventually pass after about 30 minutes. Washing with liquids does not help. Has not had to go to ED for this. No problems initiating swallow. Improvement with dilation in the past. Has systemic sclerosis. Uses biotin mouth wash sometimes  Bowel habits - 1 formed BM/day.  2-3 days per week will pass mucus. With fecal leakage once weekly. Wears pad in underwear. GI bleeding - denies hematochezia, hematemesis, melena, BRBPR, black/tarry stools, and coffee ground emesis  NSAID usage - none    ROS:  Constitutional: No fevers, no chills, No unintentional weight loss, + improving fatigue,   ENT: No allergies  CV: No chest pain, no palpitations, +  perif. edema, no sob on exertion  Pulm: No cough, no shortness of breath, + wheezes, no sputum  Ophtho: No vision changes  GI: see HPI; also no nausea, no vomiting, no change in appetite  Derm: No rash  Heme: No lymphadenopathy, No bruising  MSK: + arthritis, no muscle pain, no muscle weakness  : No dysuria, No hematuria  Endo: No hot or cold intolerance  Neuro: No syncope, No seizure,     PMHX:  has a past medical history of Abnormal Pap smear, Acid reflux, Allergy, Arthritis, GERD (gastroesophageal reflux disease), History of migraine headaches, Hypertension, Idiopathic neuropathy (7/20/2012), ILD (interstitial lung disease) (11/6/2013), Iron deficiency anemia (3/18/2014), MRSA carrier, Osteopenia, Pulmonary fibrosis, Pulmonary hypertension, Raynaud's disease, Scleroderma, diffuse, Sjogren's syndrome, and Vitamin D deficiency (11/14/2013).    PSHX:  has a past surgical history that includes Dilation and curettage of uterus; Breast biopsy; Cervical conization w/ laser (1970); Hysterectomy (1990); Esophagogastroduodenoscopy; Colonoscopy; Esophagogastroduodenoscopy (N/A, 3/29/2019); and  "Colonoscopy (N/A, 3/29/2019).    The patient's social and family histories were reviewed by me and updated in the appropriate section of the electronic medical record.    Review of patient's allergies indicates:   Allergen Reactions    Sulfa (sulfonamide antibiotics)      Other reaction(s): Rash       Current Outpatient Medications   Medication Sig    ADCIRCA 20 mg Tab TAKE TWO  TABLETS (40MG) BY MOUTH ONCE DAILY. CALL [phone] TO REFILL.    b complex vitamins tablet Take 1 tablet by mouth once daily.    ergocalciferol (ERGOCALCIFEROL) 50,000 unit Cap TAKE 1 CAPSULE BY MOUTH EVERY 7 DAYS    ferrous sulfate 325 (65 FE) MG EC tablet Take 1 tablet (325 mg total) by mouth 3 (three) times daily. (Patient taking differently: Take 325 mg by mouth once daily. )    multivitamin capsule Take 1 capsule by mouth once daily.     NIFEdipine (ADALAT CC) 90 MG TbSR TAKE 1 TABLET(90 MG) BY MOUTH EVERY DAY    omeprazole (PRILOSEC) 40 MG capsule Take 1 capsule (40 mg total) by mouth 2 (two) times daily before meals. (Patient taking differently: Take 40 mg by mouth every morning. )    pravastatin (PRAVACHOL) 20 MG tablet Take 1 tablet (20 mg total) by mouth every evening.    pregabalin (LYRICA) 150 MG capsule Take 1 capsule (150 mg total) by mouth 3 (three) times daily. (Patient taking differently: Take 150 mg by mouth once daily. )    ranitidine (ZANTAC) 150 MG capsule TAKE 1 CAPSULE(150 MG) BY MOUTH TWICE DAILY (Patient taking differently: TAKE 1 CAPSULE(150 MG) BY MOUTH DAILY EVENINGS)    spironolactone (ALDACTONE) 25 MG tablet Take 1 tablet (25 mg total) by mouth once daily.    albuterol (VENTOLIN HFA) 90 mcg/actuation inhaler Inhale 2 puffs into the lungs every 4 (four) hours as needed for Wheezing. Rescue     No current facility-administered medications for this visit.          Objective Findings:    Vital Signs:  /65   Pulse 66   Ht 5' 6" (1.676 m)   Wt 72.8 kg (160 lb 7.9 oz)   BMI 25.90 kg/m²  Body " mass index is 25.9 kg/m².    Physical Exam:   General appearance: alert, cooperative, no distress. + evidence of systemic sclerosis w  narrow oral aperture.   HENT: Normocephalic, atraumatic, neck symmetrical, no nasal discharge  Eyes: conjunctivae/corneas clear, PERRL, EOM's intact  Lungs: clear to auscultation bilaterally, no dullness to percussion bilaterally  Heart: regular rate and rhythm without rub; no displacement of the PMI  Abdomen: soft, non-tender; bowel sounds normoactive; no organomegaly  Extremities: extremities symmetric; no clubbing, cyanosis, or edema, no raynauds.   +sclerodactyly, osteolysis of digits, digital ulcers.  Integument: Skin color, texture, turgor normal; no rashes; hair distrubution normal,  +telangectasia, and tightness of skin.    Neurologic: Alert and oriented X 3, normal strength, normal coordination and gait  Psychiatric: no pressured speech; normal affect; no evidence of impaired cognition        Labs:  Lab Results   Component Value Date    WBC 5.04 03/12/2019    HGB 13.1 03/12/2019    HCT 39.8 03/12/2019     03/12/2019    CHOL 115 (L) 09/06/2018    TRIG 49 09/06/2018    HDL 43 09/06/2018    ALT 11 03/12/2019    AST 20 03/12/2019     03/19/2019    K 4.1 03/19/2019     03/19/2019    CREATININE 0.8 03/19/2019    BUN 13 03/19/2019    CO2 24 03/19/2019    TSH 1.287 11/25/2015    INR 1.0 06/29/2015       Endoscopy:   3/29/19 EGD:dilation in the entire esophagus. irreg zline (-). 3 cm HH. Benign appearing esophageal stenosis. Nl stomach. Few gastric polyps (hp). Nl duodenum (-).   3/29/19 colon:GPTTI. Five 2-5 mm TC polyps (TAs). Rpt in 3 yrs  1/26/2017 EGD [facility]: NL esophagus(-).  irrg z line (-). Nl stomach. Duodenal congestion (mild chr inflamm). Esophageal polyps (hp). Rpt 6 months to check for complete removal.   2014 EGD Dr. Gusman-[facility] [place] polyps. bx-chronic inflammation;hyperplastic change. Repeat in 6 months for surveillance.   2014 colonoscopy Dr. Gusman - 3 mm  descending colon polyp. Medium internal hemorrhoids. path- fecal matter, no human tissue present. Repeat in 5 years.  2011 EGD [facility]- HH. White nummular esophageal lesions. irregular z line. Esophageal nodule. Path- mild chronic gastritis. No IM. Esophageal candida.  2011 colonoscopy [facility]- sigmoid tics. Internal hemorrhoids. 4 mm ascending colon polyp. Path-hyperplastic polyp. repeat in 5 years.   2009 EGD [facility]- Schatzki ring, esophogeal nodule, HH, irregular z line. Path-chronic gastritis  2007 colonoscopy [facility]- WNL  2007 EGD [facility]- irregular z line. Path-chronic inflammation. No IM.    Assessment:    1. Iron deficiency anemia, unspecified iron deficiency anemia type    2. Gastroesophageal reflux disease, esophagitis presence not specified    3. Esophageal dysphagia          Recommendations:  1. GERD- well controlled. Continue present meds  2. Esophageal dysphagia- better. Continue swallow precautions.continue  biotin 3-4 times daily. Will consider esophageal manometry/esophagram with 13 mm BT if worsens.  3. ROXANNE-check labs. If ROXANNE, will discuss VCE.     F/u pending results.     Order summary:  Orders Placed This Encounter    CBC auto differential    Iron and TIBC    Ferritin       Thank you for allowing me to participate in the care of Yamel Pringle, JACINTA, FNP-C

## 2019-04-15 ENCOUNTER — TELEPHONE (OUTPATIENT)
Dept: GASTROENTEROLOGY | Facility: CLINIC | Age: 69
End: 2019-04-15

## 2019-04-15 NOTE — TELEPHONE ENCOUNTER
----- Message from Lela Mooney sent at 4/15/2019 11:50 AM CDT -----  Contact: pt [phone]  Patient Returning Call from [facility]    Who Left Message for Patient:Esthela  Communication Preference:pt [phone]  Additional Information:

## 2019-04-15 NOTE — TELEPHONE ENCOUNTER
----- Message from Annamaria Mendoza MD sent at 4/12/2019  6:49 PM CDT -----  PATH:    Please let the pt know that pathology from recent procedure shows:    Pre-cancerous polyps. These type of polyps may develop into cancer if not removed. The polyp(s) were removed.      Benign polyps in the stomqch . These type of polyps do not develop into cancer.     No other significant abnormality    Will need repeat colonoscopy in 3 years      Endoscopy and colonoscopy is not a perfect exam of the colon. Rarely a significant polyp or colon cancer can be missed. He/she should not ignore symptoms such as blood with bowel movements or abdominal pain and report these symptoms to the doctor if they occur.     Should follow up with ALIS Pringle NP next available

## 2019-04-17 ENCOUNTER — TELEPHONE (OUTPATIENT)
Dept: GASTROENTEROLOGY | Facility: CLINIC | Age: 69
End: 2019-04-17

## 2019-04-17 NOTE — TELEPHONE ENCOUNTER
----- Message from Jeannette Pringle NP sent at 4/17/2019  3:45 PM CDT -----  Results released to pt in [facility].  Please have pt f/u with me in motility clinic in 6 months.    Thanks,  Clara, NP

## 2019-04-17 NOTE — TELEPHONE ENCOUNTER
Spoke w/ pt and informed her that her results have been released on the portal and Jeannette will like for her to f/u in 6 months in the motility clinic.

## 2019-04-22 ENCOUNTER — PATIENT OUTREACH (OUTPATIENT)
Dept: OTHER | Facility: OTHER | Age: 69
End: 2019-04-22

## 2019-04-22 NOTE — PROGRESS NOTES
HPI:  Mrs. Yamel Shah is a 68 y.o. female who is newly enrolled in the [facility]. Pertinent PMH includes pulmonary hypertension with . Reviewed allergies and current list of medications on file. Patient takes nifedipine in the evening and spironolactone in the morning with no complaints. She is concerned that blood pressure cuff is not accurate as in office readings have been lower. Reviewed technique and patient is checking blood pressure cuff properly. She plans to take device to the [facility].        Last 5 Patient Entered Readings                                      Current 30 Day Average: 138/68     Recent Readings 4/22/2019 4/20/2019 4/18/2019 4/17/2019 4/17/2019    SBP (mmHg) 152 141 138 159 158    DBP (mmHg) 72 64 69 72 77    Pulse 64 81 64 60 62          Patient denies s/s of hypotension (lightheadedness, dizziness, nausea, fatigue) associated with low readings. Instructed patient to inform me if this occurs, patient confirms understanding.    Patient denies s/s of hypertension (SOB, CP, severe headaches, changes in vision) associated with high readings. Instructed patient to go to the ED if BP >180/110 and accompanied by hypertensive s/s, patient confirms understanding.    Assessment:  Patient's current 30-day average is slightly above goal of <130/80 mmHg. Possible concern regarding cuff.        Plan:  Continue current regimen.  Patient referred to the [facility].   Patients health , Melany Salazar, will be following up every 3-4 weeks.   I will continue to monitor regularly and will follow-up in 5 weeks, sooner if blood pressure begins to trend upward or downward.     Current medication regimen:  Hypertension Medications             NIFEdipine (ADALAT CC) 90 MG TbSR TAKE 1 TABLET(90 MG) BY MOUTH EVERY DAY    spironolactone (ALDACTONE) 25 MG tablet Take 1 tablet (25 mg total) by mouth once daily.        Patient has my contact information and knows to call with any concerns  or clinical changes.

## 2019-04-25 ENCOUNTER — PATIENT OUTREACH (OUTPATIENT)
Dept: OTHER | Facility: OTHER | Age: 69
End: 2019-04-25

## 2019-04-25 NOTE — PROGRESS NOTES
Last 5 Patient Entered Readings                                      Current 30 Day Average: 138/69     Recent Readings 4/25/2019 4/24/2019 4/23/2019 4/22/2019 4/20/2019    SBP (mmHg) 125 157 123 152 141    DBP (mmHg) 63 75 75 72 64    Pulse 81 59 73 64 81          Patient has been taking BP readings while sitting in a chair, arm supported, sitting for 5 minutes, and waiting at least an hour after taking BP medications. Reviewed proper BP technique and importance of adherence to ensure accurate readings.    Digital Medicine: Health  Follow Up    Lifestyle Modifications:    1.Dietary Modifications (Sodium intake <2,000mg/day, food labels, dining out): Reports continued salt monitoring and plant-based diet, limiting processed foods.Typical meals consist of the following:    Breakfast: banana, fruit, oatmeal, 2 boiled eggs every day, egg McMuffin 1/week    Lunch: leftovers, sandwich    Dinner: chicken, stir clemente vegetablels   Snacks: dark chocolate   Beverages: water, tea, root bear, green tea or coffee    2.Physical Activity: Patient reports walking, ulcers on feet limit movement. Verbalized goal to lose weight, has already noticed slight weight loss. Has been trying to walk stairs and stretching at home. Would like to try wrist weights, requested links be sent via email.     3.Medication Therapy: Patient has been compliant with the medication regimen.     4.Patient has the following medication side effects/concerns: None    Follow up with Mrs. Yamel Shah completed. No further questions or concerns. Will continue to follow up to achieve health goals.

## 2019-05-10 ENCOUNTER — LAB VISIT (OUTPATIENT)
Dept: LAB | Facility: HOSPITAL | Age: 69
End: 2019-05-10
Attending: INTERNAL MEDICINE
Payer: MEDICARE

## 2019-05-10 DIAGNOSIS — E55.9 VITAMIN D DEFICIENCY: ICD-10-CM

## 2019-05-10 DIAGNOSIS — I10 HYPERTENSION, UNSPECIFIED TYPE: ICD-10-CM

## 2019-05-10 LAB
25(OH)D3+25(OH)D2 SERPL-MCNC: 31 NG/ML (ref 30–96)
ALBUMIN SERPL BCP-MCNC: 3.4 G/DL (ref 3.5–5.2)
ALP SERPL-CCNC: 102 U/L (ref 55–135)
ALT SERPL W/O P-5'-P-CCNC: 9 U/L (ref 10–44)
ANION GAP SERPL CALC-SCNC: 6 MMOL/L (ref 8–16)
AST SERPL-CCNC: 19 U/L (ref 10–40)
BASOPHILS # BLD AUTO: 0.03 K/UL (ref 0–0.2)
BASOPHILS NFR BLD: 0.5 % (ref 0–1.9)
BILIRUB SERPL-MCNC: 0.4 MG/DL (ref 0.1–1)
BUN SERPL-MCNC: 17 MG/DL (ref 8–23)
CALCIUM SERPL-MCNC: 9.5 MG/DL (ref 8.7–10.5)
CHLORIDE SERPL-SCNC: 108 MMOL/L (ref 95–110)
CHOLEST SERPL-MCNC: 125 MG/DL (ref 120–199)
CHOLEST/HDLC SERPL: 2.9 {RATIO} (ref 2–5)
CO2 SERPL-SCNC: 25 MMOL/L (ref 23–29)
CREAT SERPL-MCNC: 0.7 MG/DL (ref 0.5–1.4)
DIFFERENTIAL METHOD: ABNORMAL
EOSINOPHIL # BLD AUTO: 0.1 K/UL (ref 0–0.5)
EOSINOPHIL NFR BLD: 0.9 % (ref 0–8)
ERYTHROCYTE [DISTWIDTH] IN BLOOD BY AUTOMATED COUNT: 15 % (ref 11.5–14.5)
EST. GFR  (AFRICAN AMERICAN): >60 ML/MIN/1.73 M^2
EST. GFR  (NON AFRICAN AMERICAN): >60 ML/MIN/1.73 M^2
GLUCOSE SERPL-MCNC: 94 MG/DL (ref 70–110)
HCT VFR BLD AUTO: 37.6 % (ref 37–48.5)
HDLC SERPL-MCNC: 43 MG/DL (ref 40–75)
HDLC SERPL: 34.4 % (ref 20–50)
HGB BLD-MCNC: 12.7 G/DL (ref 12–16)
IMM GRANULOCYTES # BLD AUTO: 0.01 K/UL (ref 0–0.04)
IMM GRANULOCYTES NFR BLD AUTO: 0.2 % (ref 0–0.5)
LDLC SERPL CALC-MCNC: 64.6 MG/DL (ref 63–159)
LYMPHOCYTES # BLD AUTO: 1.4 K/UL (ref 1–4.8)
LYMPHOCYTES NFR BLD: 24.2 % (ref 18–48)
MCH RBC QN AUTO: 33.1 PG (ref 27–31)
MCHC RBC AUTO-ENTMCNC: 33.8 G/DL (ref 32–36)
MCV RBC AUTO: 98 FL (ref 82–98)
MONOCYTES # BLD AUTO: 0.4 K/UL (ref 0.3–1)
MONOCYTES NFR BLD: 6.8 % (ref 4–15)
NEUTROPHILS # BLD AUTO: 3.8 K/UL (ref 1.8–7.7)
NEUTROPHILS NFR BLD: 67.4 % (ref 38–73)
NONHDLC SERPL-MCNC: 82 MG/DL
NRBC BLD-RTO: 0 /100 WBC
PLATELET # BLD AUTO: 193 K/UL (ref 150–350)
PMV BLD AUTO: 13 FL (ref 9.2–12.9)
POTASSIUM SERPL-SCNC: 4.2 MMOL/L (ref 3.5–5.1)
PROT SERPL-MCNC: 8.4 G/DL (ref 6–8.4)
RBC # BLD AUTO: 3.84 M/UL (ref 4–5.4)
SODIUM SERPL-SCNC: 139 MMOL/L (ref 136–145)
TRIGL SERPL-MCNC: 87 MG/DL (ref 30–150)
TSH SERPL DL<=0.005 MIU/L-ACNC: 1.49 UIU/ML (ref 0.4–4)
WBC # BLD AUTO: 5.59 K/UL (ref 3.9–12.7)

## 2019-05-10 PROCEDURE — 84443 ASSAY THYROID STIM HORMONE: CPT

## 2019-05-10 PROCEDURE — 80061 LIPID PANEL: CPT

## 2019-05-10 PROCEDURE — 82306 VITAMIN D 25 HYDROXY: CPT

## 2019-05-10 PROCEDURE — 85025 COMPLETE CBC W/AUTO DIFF WBC: CPT

## 2019-05-10 PROCEDURE — 80053 COMPREHEN METABOLIC PANEL: CPT

## 2019-05-10 PROCEDURE — 36415 COLL VENOUS BLD VENIPUNCTURE: CPT | Mod: PO

## 2019-05-13 ENCOUNTER — OFFICE VISIT (OUTPATIENT)
Dept: INTERNAL MEDICINE | Facility: CLINIC | Age: 69
End: 2019-05-13
Payer: MEDICARE

## 2019-05-13 VITALS
RESPIRATION RATE: 14 BRPM | SYSTOLIC BLOOD PRESSURE: 140 MMHG | DIASTOLIC BLOOD PRESSURE: 62 MMHG | HEART RATE: 76 BPM | TEMPERATURE: 98 F | BODY MASS INDEX: 26.83 KG/M2 | WEIGHT: 166.25 LBS

## 2019-05-13 DIAGNOSIS — L97.529 SKIN ULCERS OF BOTH FEET: ICD-10-CM

## 2019-05-13 DIAGNOSIS — D50.9 IRON DEFICIENCY ANEMIA, UNSPECIFIED IRON DEFICIENCY ANEMIA TYPE: ICD-10-CM

## 2019-05-13 DIAGNOSIS — G60.9 IDIOPATHIC NEUROPATHY: ICD-10-CM

## 2019-05-13 DIAGNOSIS — M34.9 SCLERODERMA: ICD-10-CM

## 2019-05-13 DIAGNOSIS — L97.519 SKIN ULCERS OF BOTH FEET: ICD-10-CM

## 2019-05-13 DIAGNOSIS — G89.29 OTHER CHRONIC PAIN: Chronic | ICD-10-CM

## 2019-05-13 DIAGNOSIS — I10 ESSENTIAL HYPERTENSION: Primary | ICD-10-CM

## 2019-05-13 PROCEDURE — 99213 OFFICE O/P EST LOW 20 MIN: CPT | Mod: PBBFAC,PO | Performed by: INTERNAL MEDICINE

## 2019-05-13 PROCEDURE — 99214 PR OFFICE/OUTPT VISIT, EST, LEVL IV, 30-39 MIN: ICD-10-PCS | Mod: S$PBB,,, | Performed by: INTERNAL MEDICINE

## 2019-05-13 PROCEDURE — 99999 PR PBB SHADOW E&M-EST. PATIENT-LVL III: CPT | Mod: PBBFAC,,, | Performed by: INTERNAL MEDICINE

## 2019-05-13 PROCEDURE — 99999 PR PBB SHADOW E&M-EST. PATIENT-LVL III: ICD-10-PCS | Mod: PBBFAC,,, | Performed by: INTERNAL MEDICINE

## 2019-05-13 PROCEDURE — 99214 OFFICE O/P EST MOD 30 MIN: CPT | Mod: S$PBB,,, | Performed by: INTERNAL MEDICINE

## 2019-05-19 PROBLEM — I77.1 ARTERIAL INSUFFICIENCY WITH ISCHEMIC ULCER: Status: ACTIVE | Noted: 2019-05-19

## 2019-05-19 PROBLEM — L98.499 ARTERIAL INSUFFICIENCY WITH ISCHEMIC ULCER: Status: ACTIVE | Noted: 2019-05-19

## 2019-05-19 PROBLEM — G89.29 CHRONIC PAIN: Chronic | Status: ACTIVE | Noted: 2019-05-19

## 2019-05-19 PROBLEM — E46 PROTEIN-CALORIE MALNUTRITION: Status: ACTIVE | Noted: 2019-05-19

## 2019-05-19 NOTE — PROGRESS NOTES
Subjective:       Patient ID: Yamel Shah is a 68 y.o. female.    Chief Complaint: Hypertension and Follow-up    HPI   The patient presents for follow-up on medical conditions which include hypertension, peripheral neuropathy, iron deficiency anemia, scleroderma, chronic skin ulcers of both feet, GERD, and chronic pain. The patient is currently using omeprazole in the morning and ranitidine at bedtime for management of her reflux symptoms.  The patient reports she had changed to a plant based diet for the past 2 months.    She remains on Lyrica therapy for chronic pain primarily due to her peripheral neuropathy.  She has chronic bilateral foot ulcers.  She is caring for these at home on her own.  She has declined going to [facility] for re-evaluation feeling that she can perform the same skin treatments at home.  Other medical conditions include scleroderma and iron deficiency anemia.  She is followed by Rheumatology for her scleroderma.  In spite of her pain she still remains active at home.  She recently retired from her job.    Patient reports her blood pressures do fluctuate.  She is followed by the Digital Hypertension service.  Systolic blood pressures have ranged from 115-130.    Review of Systems   Constitutional: Positive for activity change, fatigue and unexpected weight change. Negative for chills and fever.   HENT: Negative for sore throat, trouble swallowing and voice change.    Respiratory: Negative for cough and shortness of breath.    Cardiovascular: Positive for leg swelling. Negative for chest pain.   Gastrointestinal: Negative for abdominal pain.   Genitourinary: Negative for dysuria.   Musculoskeletal: Positive for arthralgias.   Skin: Positive for wound.   Neurological: Negative for dizziness, tremors, weakness and headaches.       Objective:      Physical Exam   Constitutional: She is oriented to person, place, and time. She appears well-developed and well-nourished.  No distress.   Patient has gained 11 lb since 01/01/2019.   HENT:   Head: Normocephalic and atraumatic.   Mouth/Throat: Oropharynx is clear and moist.   Eyes: Conjunctivae are normal. No scleral icterus.   Neck: Normal range of motion. Neck supple. No thyromegaly present.   Cardiovascular: Normal rate, regular rhythm and normal heart sounds. Exam reveals no gallop.   Pulmonary/Chest: Effort normal. No respiratory distress. She has no wheezes. She has no rales.   Abdominal: Soft. Bowel sounds are normal. She exhibits no distension and no mass. There is no tenderness.   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   Lymphadenopathy:     She has no cervical adenopathy.   Neurological: She is alert and oriented to person, place, and time. No cranial nerve deficit. Coordination normal.   Skin: Skin is warm and dry. No rash noted.   Compression dressings are present on both lower extremities.   Psychiatric: She has a normal mood and affect. Her behavior is normal.   Nursing note and vitals reviewed.      Assessment:       1. Essential hypertension    2. Iron deficiency anemia, unspecified iron deficiency anemia type    3. Scleroderma    4. Idiopathic neuropathy    5. Skin ulcers of both feet    6. Other chronic pain        Plan:     Yamel was seen today for hypertension and follow-up.  Blood tests and follow-up visit will be obtained in 4 months.  The patient has scheduled follow-up vascular surgery soon regarding peripheral ischemia.  Current therapy will be continued.  She was encouraged to use omeprazole twice daily for the next and then decrease to once daily for maintenance therapy.  This should alleviate her flare of reflux.    Diagnoses and all orders for this visit:    Essential hypertension  -     CBC auto differential; Future  -     Comprehensive metabolic panel; Future    Iron deficiency anemia, unspecified iron deficiency anemia type    Scleroderma    Idiopathic neuropathy    Skin ulcers of  both feet    Other chronic pain

## 2019-05-28 ENCOUNTER — TELEPHONE (OUTPATIENT)
Dept: VASCULAR SURGERY | Facility: CLINIC | Age: 69
End: 2019-05-28

## 2019-05-28 NOTE — TELEPHONE ENCOUNTER
----- Message from Shae Rand sent at 5/28/2019 11:02 AM CDT -----  Reason for call:Pt calling to schedule a appt.        Communication Preference:[phone]    Additional Information:

## 2019-05-28 NOTE — PROGRESS NOTES
Last 5 Patient Entered Readings                                      Current 30 Day Average: 128/63     Recent Readings 5/28/2019 5/27/2019 5/26/2019 5/25/2019 5/24/2019    SBP (mmHg) 128 128 157 115 113    DBP (mmHg) 73 66 62 58 54    Pulse 74 69 60 68 86        Chart Reviewed. Patient's current 30-day average is at goal of <130/80 mmHg per 2017 ACC/AHA HTN guidelines. No medication recommendations at this time. I will continue monitoring and follow-up as scheduled, sooner if blood pressure begins to trend upward or downward.

## 2019-05-29 ENCOUNTER — PATIENT OUTREACH (OUTPATIENT)
Dept: OTHER | Facility: OTHER | Age: 69
End: 2019-05-29

## 2019-05-29 NOTE — PROGRESS NOTES
Last 5 Patient Entered Readings                                      Current 30 Day Average: 128/63     Recent Readings 5/29/2019 5/29/2019 5/28/2019 5/27/2019 5/26/2019    SBP (mmHg) 139 160 128 128 157    DBP (mmHg) 56 75 73 66 62    Pulse 63 72 74 69 60          Digital Medicine: Health  Follow Up    Left voicemail to follow up with Ms. Yamel Shah.  Current BP average 128/63 mmHg is at goal,130/80

## 2019-05-29 NOTE — PROGRESS NOTES
"Last 5 Patient Entered Readings                                      Current 30 Day Average: 128/63     Recent Readings 5/29/2019 5/29/2019 5/28/2019 5/27/2019 5/26/2019    SBP (mmHg) 139 160 128 128 157    DBP (mmHg) 56 75 73 66 62    Pulse 63 72 74 69 60        Digital Medicine: Health  Follow Up    Lifestyle Modifications:    1.Dietary Modifications (Sodium intake <2,000mg/day, food labels, dining out): Patient follows plant-based diet. Recently switched to pink salt, discussed sodium content. Goes to [employer] for egg McMuffin 3 times per week, discussed elevated sodium content (550+ mg sodium). Patient stated she will start making her own at home to reduce sodium intake. Typical meals consist of the following:    Breakfast:oatmeal with banana or berries, [employer] egg and muffin, grits without hot sauce or salt   Lunch: big salad with cranberries, raisins, apples, mixed greens, grilled chicken, raspberry dressing   Dinner: deferred    Snacks: 1-2 scoops of ice cream, italian ice cream    Beverages: "large fast food sized-cup" of water    2.Physical Activity: Patient enjoys walking in [employer]. Staying active doing yard work. Purchased wrist weights from [employer], verbalized intent to start routine. Will try various times/durations to discuss further next call.     3.Medication Therapy: Patient has been compliant with the medication regimen. Reports forgetting to take BP medications "about twice" per week.     4.Patient has the following medication side effects/concerns: Patient reports feet feel "inflammed" sometimes. Reports recently gaining "a lot" of weight on Lyrica. Has appointment with pain management in June, verbalized intent to discuss further then.       Follow up with Ms. Yamel Shah completed. No further questions or concerns. Will continue to follow up to achieve health goals.    "

## 2019-06-13 ENCOUNTER — TELEPHONE (OUTPATIENT)
Dept: PAIN MEDICINE | Facility: CLINIC | Age: 69
End: 2019-06-13

## 2019-06-13 ENCOUNTER — OFFICE VISIT (OUTPATIENT)
Dept: PAIN MEDICINE | Facility: CLINIC | Age: 69
End: 2019-06-13
Payer: MEDICARE

## 2019-06-13 VITALS
SYSTOLIC BLOOD PRESSURE: 132 MMHG | HEART RATE: 70 BPM | DIASTOLIC BLOOD PRESSURE: 64 MMHG | TEMPERATURE: 98 F | WEIGHT: 156.75 LBS | BODY MASS INDEX: 25.19 KG/M2 | HEIGHT: 66 IN

## 2019-06-13 DIAGNOSIS — G89.4 CHRONIC PAIN DISORDER: Primary | ICD-10-CM

## 2019-06-13 DIAGNOSIS — M34.89 CUTANEOUS SCLERODERMA: ICD-10-CM

## 2019-06-13 DIAGNOSIS — G60.9 IDIOPATHIC NEUROPATHY: ICD-10-CM

## 2019-06-13 PROCEDURE — 99213 OFFICE O/P EST LOW 20 MIN: CPT | Mod: PBBFAC | Performed by: NURSE PRACTITIONER

## 2019-06-13 PROCEDURE — 99999 PR PBB SHADOW E&M-EST. PATIENT-LVL III: CPT | Mod: PBBFAC,,, | Performed by: NURSE PRACTITIONER

## 2019-06-13 PROCEDURE — 99999 PR PBB SHADOW E&M-EST. PATIENT-LVL III: ICD-10-PCS | Mod: PBBFAC,,, | Performed by: NURSE PRACTITIONER

## 2019-06-13 PROCEDURE — 99213 OFFICE O/P EST LOW 20 MIN: CPT | Mod: S$PBB,,, | Performed by: NURSE PRACTITIONER

## 2019-06-13 PROCEDURE — 99213 PR OFFICE/OUTPT VISIT, EST, LEVL III, 20-29 MIN: ICD-10-PCS | Mod: S$PBB,,, | Performed by: NURSE PRACTITIONER

## 2019-06-13 RX ORDER — PREGABALIN 150 MG/1
150 CAPSULE ORAL 2 TIMES DAILY
Qty: 60 CAPSULE | Refills: 6 | Status: SHIPPED | OUTPATIENT
Start: 2019-06-13 | End: 2019-09-05

## 2019-06-13 NOTE — PROGRESS NOTES
Chronic Pain - Follow Up    Referring Physician: No ref. provider found    Chief Complaint:   Chief Complaint   Patient presents with    Follow-up     3 months        SUBJECTIVE: Disclaimer: This note has been generated using voice-recognition software. There may be typographical errors that have been missed during proof-reading    Interval History 6/13/2019:  The patient returns to clinic for follow up for bilateral foot pain. She continues to report bilateral foot pain that is burning in nature. She continues to have slow healing wounds to both feet from ulcers. She continues to take Lyrica once daily but reports increased pain. She continues to take Vitamin B12. She denies any other health changes. Her pain today is 8/10.    Interval History 3/13/2019:  The patient returns to clinic today for follow up. She continues to report bilateral foot pain that is burning and tingling in nature. Her pain is worse with prolonged walking and standing. She continues to have bilateral foot wounds. She reports that these wounds have significantly improved since last visit. She is currently taking Vitamin B12 with benefit. She continues to report benefit with Lyrica. She is currently taking this once daily. She denies any other health changes. Her pain today is 5/10.    Interval History 2/6/2019:  The patient returns to clinic today for follow up. She reports that her bilateral foot wounds have significantly improved since last visit. She does report some significant improvement in her foot pain. She reports intermittent bilateral foot pain especially with prolonged walking. She describes this pain as heavy and tingling in nature. She is no longer taking Celebrex. She is currently taking Lyrica 150 mg BID with benefit. She does report that the Lyrica is expensive for her. She denies any other health changes. Her pain today is 5/10.    Interval History 12/5/18:  Patient returns to clinic today for follow up. She reports that  since increasing her medications, she is having significant improvement in pain during the day time. She is currently taking Lyrica 75mg TID and Celebrex 200mg TID. However, she states that the burning  And tingling pain in both her feet increase in the evening around 6 or 7pm. She is unable to sleep during the nights due to the pain. She is still wearing her bilateral boots due to non healing ulcers from her scleroderma.     Interval History 8/30/2018:  The patient returns to clinic today for follow up. She continues to report bilateral foot pain that is burning and tingling in nature. She reports that this pain is worse at night. She is currently taking Lyrica 75 mg BID with limited relief. She did not have any benefit with Mobic. She continues to take Aleve with some benefit. She continues to have nonhealing ulcers. She wears an ACE bandage to her right calf, as well as boots to her bilateral feet. She denies any other health changes. Her pain today is 7/10.     Interval History 7/11/2018:  Since previous encounter she has weaned from gabapentin to 600mg / day and did not notice significant worsening of pain, but was having somnelence from higher doses of the medication in the past.  We are weaning in an attempt to trial Lyrica as an alternative to gabapentin.  Tramadol causes significant sedation, limiting its use.  She has discontinued the use of the tramadol.  Additionally she does have benefit from anti-inflammatory medication, has been using aleve, has not trialed CANTRELL-2 inhibitors in the past.    Interval history 05/16/2018:      The patient has had x-rays of the lumbar spine which did not reveal any evidence of significant neuroforaminal stenosis with degenerative disc disease.  There is mild facet arthropathy.  She has escalated her gabapentin and is currently taking 2100 mg of gabapentin per day without any noticeable improvement but daytime somnolence.  She continues to have nonhealing ulcers and topical  pain cream is helping to a limited degree over her leg in areas where there is no skin disruption.    Initial encounter:    Yamel Shah presents to the clinic for the evaluation of foot pain. The pain started 2 years ago following ulcers and symptoms have been worsening.    Brief history: History of scleroderma    Pain Description:    The pain is located in the both feet in the area of slowly healing chronic foot ulcers which were partly from venous insufficiency and stasis associated with her scleroderma.  In her right lower extremity she describes radicular symptoms in the L4/5 distribution.    At BEST  5/10     At WORST  10/10 on the WORST day.      On average pain is rated as 8/10.     Today the pain is rated as 8/10    The pain is described as burning, sharp, shooting and constant      Symptoms interfere with daily activity, sleeping and work.     Exacerbating factors: Laying, Walking, Night Time, Morning and Getting out of bed/chair.      Mitigating factors medications.     Patient denies night fever/night sweats, urinary incontinence, bowel incontinence, significant weight loss, significant motor weakness and loss of sensations.  Patient denies any suicidal or homicidal ideations    Pain Medications:  Current:  Lyrica 150 mg daily    Tried in Past:  NSAIDs -Aleve, Mobic, Celebrex  TCA -Never  SNRI -Never  Anti-convulsants - Gabapentin, Lyrica  Muscle Relaxants -Never  Opioids-Tramadol    Physical Therapy/Home Exercise: no       report:  Reviewed and consistent with medication use as prescribed.    Pain Procedures: none    Chiropractor -never  Acupuncture - never  TENS unit -never  Spinal decompression -never  Joint replacement -never    Imaging:   X-ray lumbar spine 6/1/2016:  2 views: Alignment is normal. There is mild DJD. No fracture dislocation bone destruction seen.   Impression      Mild DJD.     Xray lumbar spine 4/2018:  COMPARISON:  June 2016    FINDINGS:  There is slight curvature of the  lumbar spine.  The vertebral bodies are normally aligned and normal in height.  Mild disc space narrowing present at L5-S1.  There is mild facet degenerative change in the lower spine.  No significant osteophytic spurring present.  There is no change in alignment with flexion or extension.      Past Medical History:   Diagnosis Date    Abnormal Pap smear     Acid reflux     Allergy     Arthritis     GERD (gastroesophageal reflux disease)     History of migraine headaches     Hypertension     Idiopathic neuropathy 7/20/2012    ILD (interstitial lung disease) 11/6/2013    Iron deficiency anemia 3/18/2014    MRSA carrier     Osteopenia     Pulmonary fibrosis     Pulmonary hypertension     Raynaud's disease     Scleroderma, diffuse     Sjogren's syndrome     Vitamin D deficiency 11/14/2013     Past Surgical History:   Procedure Laterality Date    BREAST BIOPSY      Left, benign    CERVICAL CONIZATION   W/ LASER  1970    COLONOSCOPY      COLONOSCOPY N/A 3/29/2019    [facility] at [facility] (2ND F[facility])    COLONOSCOPY N/A 4/4/2014    Performed by [facility] Gusman [facility] at [facility] END[facility] (4TH FLR)    DILATION AND CURETTAGE OF UTERUS      EGD (ESOPHAGOGASTRODUODENOSCOPY) N/A 3/29/2019    Performed by [facility] at [facility] END[facility] (2ND FLR)    EGD (ESOPHAGOGASTRODUODENOSCOPY) N/A 4/4/2014    Performed by [facility] at [facility] END[facility] (4TH FLR)    ESOPHAGOGASTRODUODENOSCOPY      ESOPHAGOGASTRODUODENOSCOPY (EGD) N/A 1/26/2017    Performed by [facility] at [facility] (4TH FLR)    HYSTERECTOMY  1990    FRANDY (AUB, Fibroids), ovaries remain     Social History     Socioeconomic History    Marital status:      Spouse name: Lewis    Number of children: 4    Years of education: Not on file    Highest education level: Not on file   Occupational History    Occupation: -retired     Employer: [employer]     Comment[place]     Employer: RETIRED    Social Needs    Financial resource strain: Not on file    Food insecurity:     Worry: Not on file     Inability: Not on file    Transportation needs:     Medical: Not on file     Non-medical: Not on file   Tobacco Use    Smoking status: Never Smoker    Smokeless tobacco: Never Used   Substance and Sexual Activity    Alcohol use: No     Alcohol/week: 0.0 oz     Frequency: Never    Drug use: No    Sexual activity: Yes     Partners: Male   Lifestyle    Physical activity:     Days per week: Not on file     Minutes per session: Not on file    Stress: Not on file   Relationships    Social connections:     Talks on phone: Not on file     Gets together: Not on file     Attends Gnosticist service: Not on file     Active member of club or organization: Not on file     Attends meetings of clubs or organizations: Not on file     Relationship status: Not on file   Other Topics Concern    Are you pregnant or think you may be? No    Breast-feeding No   Social History Narrative         Family History   Problem Relation Age of Onset    Breast cancer Mother     No Known Problems Daughter     No Known Problems Son     No Known Problems Daughter     No Known Problems Son     Breast cancer Sister     Breast cancer Maternal Aunt     Osteoarthritis Brother     Melanoma Neg Hx     Colon cancer Neg Hx     Crohn's disease Neg Hx     Stomach cancer Neg Hx     Ulcerative colitis Neg Hx     Rectal cancer Neg Hx     Irritable bowel syndrome Neg Hx     Esophageal cancer Neg Hx     Celiac disease Neg Hx        Review of patient's allergies indicates:   Allergen Reactions    Sulfa (sulfonamide antibiotics)      Other reaction(s): Rash       Current Outpatient Medications   Medication Sig    ADCIRCA 20 mg Tab TAKE TWO  TABLETS (40MG) BY MOUTH ONCE DAILY. CALL [phone] TO REFILL.    albuterol (VENTOLIN HFA) 90 mcg/actuation inhaler Inhale 2 puffs into the lungs every 4 (four) hours as needed for Wheezing.  Rescue    b complex vitamins tablet Take 1 tablet by mouth once daily.    ergocalciferol (ERGOCALCIFEROL) 50,000 unit Cap TAKE 1 CAPSULE BY MOUTH EVERY 7 DAYS    ferrous sulfate 325 (65 FE) MG EC tablet Take 1 tablet (325 mg total) by mouth 3 (three) times daily. (Patient taking differently: Take 325 mg by mouth once daily. )    multivitamin capsule Take 1 capsule by mouth once daily.     NIFEdipine (ADALAT CC) 90 MG TbSR TAKE 1 TABLET(90 MG) BY MOUTH EVERY DAY    pravastatin (PRAVACHOL) 20 MG tablet Take 1 tablet (20 mg total) by mouth every evening.    ranitidine (ZANTAC) 150 MG capsule TAKE 1 CAPSULE(150 MG) BY MOUTH TWICE DAILY (Patient taking differently: TAKE 1 CAPSULE(150 MG) BY MOUTH DAILY EVENINGS)    spironolactone (ALDACTONE) 25 MG tablet Take 1 tablet (25 mg total) by mouth once daily.    omeprazole (PRILOSEC) 40 MG capsule Take 1 capsule (40 mg total) by mouth 2 (two) times daily before meals. (Patient taking differently: Take 40 mg by mouth every morning. )    pregabalin (LYRICA) 150 MG capsule Take 1 capsule (150 mg total) by mouth 3 (three) times daily. (Patient taking differently: Take 150 mg by mouth once daily. )     No current facility-administered medications for this visit.        REVIEW OF SYSTEMS:    GENERAL:  No weight loss, malaise or fevers.  HEENT:   No recent changes in vision or hearing  NECK:  Negative for lumps,  difficulty with swallowing associated with scleroderma.  RESPIRATORY:  Negative for cough, wheezing or shortness of breath, patient denies any recent URI. Albuterol (history of ILD)  CARDIOVASCULAR:  Negative for chest pain, leg swelling or palpitations.  GI:  Negative for abdominal discomfort, blood in stools or black stools or change in bowel habits. GERD controlled zantac  MUSCULOSKELETAL:  See HPI.  SKIN:   Chronic low healing venous stasis ulcerations to bilateral lower extremities   PSYCH:  No mood disorder or recent psychosocial stressors.  Patients sleep is  "not disturbed secondary to pain.  HEMATOLOGY/LYMPHOLOGY:   History of iron deficiency anemia, takes daily iron supplementation.    ENDO: No history of diabetes or thyroid dysfunction  NEURO:   No history of headaches, syncope, paralysis, seizures or tremors.  All other reviewed and negative other than HPI.    OBJECTIVE:    /64   Pulse 70   Temp 97.8 °F (36.6 °C)   Ht 5' 6" (1.676 m)   Wt 71.1 kg (156 lb 12 oz)   BMI 25.30 kg/m²     PHYSICAL EXAMINATION:    GENERAL: Well appearing, in no acute distress, alert and oriented x3.  PSYCH:  Mood and affect appropriate.  SKIN: Tight skin associated with scleroderma.   HEAD/FACE:  Normocephalic, atraumatic. Cranial nerves grossly intact.  CV: RRR with palpation of the radial artery.  PULM: No evidence of respiratory difficulty, symmetric chest rise.  BACK:  There is no pain with palpation over the facet joints of the lumbar spine bilaterally. Limited ROM with mild pain on extension. Positive facet loading bilaterally.    EXTREMITIES: Contractures over on bilateral hands with ulcerations to knuckles, right greater than left. Boots noted to bilateral feet.   MUSCULOSKELETAL:  There is no pain to palpation over the greater trochanteric bursa bilaterally.  FABERs test is positive bilaterally.  FADIRs test is negative.   Bilateral lower extremity strength testing limited secondary to pain in the feet.    NEURO: Bilateral lower extremity coordination and muscle stretch reflexes are physiologic and symmetric. Decreased sensation to BLE. Plantar response are downgoing. No clonus.  No loss of sensation is noted.  GAIT: Antalgic, ambulates without assistance         Lab Results   Component Value Date    WBC 5.59 05/10/2019    HGB 12.7 05/10/2019    HCT 37.6 05/10/2019    MCV 98 05/10/2019     05/10/2019     BMP  Lab Results   Component Value Date     05/10/2019    K 4.2 05/10/2019     05/10/2019    CO2 25 05/10/2019    BUN 17 05/10/2019    CREATININE 0.7 " 05/10/2019    CALCIUM 9.5 05/10/2019    ANIONGAP 6 (L) 05/10/2019    ESTGFRAFRICA >60.0 05/10/2019    EGFRNONAA >60.0 05/10/2019         ASSESSMENT: 68 y.o. year old female with pain, consistent with     Encounter Diagnoses   Name Primary?    Chronic pain disorder Yes    Cutaneous scleroderma     Idiopathic neuropathy        PLAN:     - Previous imaging was reviewed and discussed with the patient today.    - Increase Lyrica to 150 mg BID, #60. Refill provided today. Medication management provided by Dr. King.     - Continue to follow up with wound care.     - Continues home exercise routine.     - RTC in 3 months.     - Dr. King was consulted on the patient and agrees with this plan.    The above plan and management options were discussed at length with patient. Patient is in agreement with the above and verbalized understanding.     Viktoriya Camara NP  06/13/2019

## 2019-06-13 NOTE — TELEPHONE ENCOUNTER
----- Message from Kanchan Mccoy sent at 6/13/2019 11:42 AM CDT -----  Contact: huag with [facility]   Name of Who is Calling: huag with [facility]       What is the request in detail: pharmacy would like to speak with nurse regarding pregabalin (LYRICA) 150 MG capsule medication. Please call     Can the clinic reply by [facility]: no    What Number to Call Back if not in [facility]: [phone]

## 2019-06-14 ENCOUNTER — TELEPHONE (OUTPATIENT)
Dept: TRANSPLANT | Facility: CLINIC | Age: 69
End: 2019-06-14

## 2019-06-14 RX ORDER — TADALAFIL 20 MG/1
20 TABLET ORAL DAILY
Qty: 60 TABLET | Refills: 11 | Status: SHIPPED | OUTPATIENT
Start: 2019-06-14 | End: 2019-06-19

## 2019-06-14 NOTE — TELEPHONE ENCOUNTER
----- Message from Kari Mckinley sent at 6/14/2019  9:23 AM CDT -----  Contact: Self  .Needs Advice    Reason for call: Pt is asking if she can take the generic- ADCIRCA because of price. Thanks        Communication Preference:  [phone]    Additional Information:

## 2019-06-19 ENCOUNTER — TELEPHONE (OUTPATIENT)
Dept: TRANSPLANT | Facility: CLINIC | Age: 69
End: 2019-06-19

## 2019-06-19 RX ORDER — TADALAFIL 20 MG/1
40 TABLET ORAL DAILY
Qty: 60 TABLET | Refills: 11
Start: 2019-06-19 | End: 2019-10-10 | Stop reason: SDUPTHER

## 2019-06-19 NOTE — TELEPHONE ENCOUNTER
----- Message from Kari Mckinley sent at 6/19/2019  3:55 PM CDT -----  Contact: [facility]  .Pharmacy Calling    Reason for call: Clarifies direction-     [facility]    Prescription Name: tadalafil (ADCIRCA) 20 mg Tab    Phone Number:   Direct line RX- [phone]    Additional Information: Pt use to take 2 tabs daily, prescription now state- Take 1 tablet (20 mg total) by mouth once daily.    .  [id number] - [place], [place] - [address]  [address] [address]  Phone: [phone] Fax: [phone]    [facility] [address] [place] [address]  Phone: [phone] Fax: [phone]        [id number] - [place], [place] - [address] [facility]  [address] [facility]  [address]  [facility] [address]  Phone: [phone] Fax: [phone]

## 2019-06-20 ENCOUNTER — HOSPITAL ENCOUNTER (OUTPATIENT)
Dept: VASCULAR SURGERY | Facility: CLINIC | Age: 69
Discharge: HOME | End: 2019-06-20
Attending: SURGERY
Payer: MEDICARE

## 2019-06-20 DIAGNOSIS — I87.2 VENOUS INSUFFICIENCY OF BOTH LOWER EXTREMITIES: ICD-10-CM

## 2019-06-20 PROCEDURE — 93970 PR US DUPLEX, UPPER OR LOWER EXT VENOUS,COMPLETE BILAT: ICD-10-PCS | Mod: 26,S$PBB,, | Performed by: SURGERY

## 2019-06-20 PROCEDURE — 93970 EXTREMITY STUDY: CPT | Mod: PBBFAC | Performed by: SURGERY

## 2019-06-20 PROCEDURE — 93970 EXTREMITY STUDY: CPT | Mod: 26,S$PBB,, | Performed by: SURGERY

## 2019-06-21 ENCOUNTER — LAB VISIT (OUTPATIENT)
Dept: LAB | Facility: HOSPITAL | Age: 69
End: 2019-06-21
Attending: INTERNAL MEDICINE
Payer: MEDICARE

## 2019-06-21 DIAGNOSIS — M34.9 SCLERODERMA: ICD-10-CM

## 2019-06-21 LAB
ALBUMIN SERPL BCP-MCNC: 3.6 G/DL (ref 3.5–5.2)
ALP SERPL-CCNC: 107 U/L (ref 55–135)
ALT SERPL W/O P-5'-P-CCNC: 10 U/L (ref 10–44)
ANION GAP SERPL CALC-SCNC: 11 MMOL/L (ref 8–16)
AST SERPL-CCNC: 19 U/L (ref 10–40)
BASOPHILS # BLD AUTO: 0.03 K/UL (ref 0–0.2)
BASOPHILS NFR BLD: 0.5 % (ref 0–1.9)
BILIRUB SERPL-MCNC: 0.3 MG/DL (ref 0.1–1)
BUN SERPL-MCNC: 14 MG/DL (ref 8–23)
CALCIUM SERPL-MCNC: 9.1 MG/DL (ref 8.7–10.5)
CHLORIDE SERPL-SCNC: 107 MMOL/L (ref 95–110)
CO2 SERPL-SCNC: 21 MMOL/L (ref 23–29)
CREAT SERPL-MCNC: 0.7 MG/DL (ref 0.5–1.4)
CRP SERPL-MCNC: 13 MG/L (ref 0–8.2)
DIFFERENTIAL METHOD: ABNORMAL
EOSINOPHIL # BLD AUTO: 0.1 K/UL (ref 0–0.5)
EOSINOPHIL NFR BLD: 0.8 % (ref 0–8)
ERYTHROCYTE [DISTWIDTH] IN BLOOD BY AUTOMATED COUNT: 14.7 % (ref 11.5–14.5)
ERYTHROCYTE [SEDIMENTATION RATE] IN BLOOD BY WESTERGREN METHOD: 52 MM/HR (ref 0–36)
EST. GFR  (AFRICAN AMERICAN): >60 ML/MIN/1.73 M^2
EST. GFR  (NON AFRICAN AMERICAN): >60 ML/MIN/1.73 M^2
GLUCOSE SERPL-MCNC: 87 MG/DL (ref 70–110)
HCT VFR BLD AUTO: 41.6 % (ref 37–48.5)
HGB BLD-MCNC: 13.3 G/DL (ref 12–16)
IMM GRANULOCYTES # BLD AUTO: 0.01 K/UL (ref 0–0.04)
IMM GRANULOCYTES NFR BLD AUTO: 0.2 % (ref 0–0.5)
LYMPHOCYTES # BLD AUTO: 1.6 K/UL (ref 1–4.8)
LYMPHOCYTES NFR BLD: 26.4 % (ref 18–48)
MCH RBC QN AUTO: 31.7 PG (ref 27–31)
MCHC RBC AUTO-ENTMCNC: 32 G/DL (ref 32–36)
MCV RBC AUTO: 99 FL (ref 82–98)
MONOCYTES # BLD AUTO: 0.4 K/UL (ref 0.3–1)
MONOCYTES NFR BLD: 6 % (ref 4–15)
NEUTROPHILS # BLD AUTO: 4.1 K/UL (ref 1.8–7.7)
NEUTROPHILS NFR BLD: 66.1 % (ref 38–73)
NRBC BLD-RTO: 0 /100 WBC
PLATELET # BLD AUTO: 198 K/UL (ref 150–350)
PMV BLD AUTO: 13.3 FL (ref 9.2–12.9)
POTASSIUM SERPL-SCNC: 4 MMOL/L (ref 3.5–5.1)
PROT SERPL-MCNC: 8.5 G/DL (ref 6–8.4)
RBC # BLD AUTO: 4.19 M/UL (ref 4–5.4)
SODIUM SERPL-SCNC: 139 MMOL/L (ref 136–145)
WBC # BLD AUTO: 6.13 K/UL (ref 3.9–12.7)

## 2019-06-21 PROCEDURE — 86140 C-REACTIVE PROTEIN: CPT

## 2019-06-21 PROCEDURE — 85025 COMPLETE CBC W/AUTO DIFF WBC: CPT

## 2019-06-21 PROCEDURE — 80053 COMPREHEN METABOLIC PANEL: CPT

## 2019-06-21 PROCEDURE — 36415 COLL VENOUS BLD VENIPUNCTURE: CPT | Mod: PN

## 2019-06-21 PROCEDURE — 85652 RBC SED RATE AUTOMATED: CPT

## 2019-06-25 ENCOUNTER — TELEPHONE (OUTPATIENT)
Dept: PHARMACY | Facility: CLINIC | Age: 69
End: 2019-06-25

## 2019-06-25 ENCOUNTER — OFFICE VISIT (OUTPATIENT)
Dept: RHEUMATOLOGY | Facility: CLINIC | Age: 69
End: 2019-06-25
Payer: MEDICARE

## 2019-06-25 ENCOUNTER — TELEPHONE (OUTPATIENT)
Dept: RHEUMATOLOGY | Facility: CLINIC | Age: 69
End: 2019-06-25

## 2019-06-25 VITALS
WEIGHT: 160.88 LBS | HEIGHT: 64 IN | DIASTOLIC BLOOD PRESSURE: 60 MMHG | SYSTOLIC BLOOD PRESSURE: 112 MMHG | HEART RATE: 72 BPM | BODY MASS INDEX: 27.46 KG/M2

## 2019-06-25 DIAGNOSIS — Z11.4 ENCOUNTER FOR SCREENING FOR HIV: ICD-10-CM

## 2019-06-25 DIAGNOSIS — L97.529 SKIN ULCERS OF BOTH FEET: ICD-10-CM

## 2019-06-25 DIAGNOSIS — K21.9 GASTROESOPHAGEAL REFLUX DISEASE, ESOPHAGITIS PRESENCE NOT SPECIFIED: ICD-10-CM

## 2019-06-25 DIAGNOSIS — J84.9 ILD (INTERSTITIAL LUNG DISEASE): ICD-10-CM

## 2019-06-25 DIAGNOSIS — E55.9 VITAMIN D DEFICIENCY: ICD-10-CM

## 2019-06-25 DIAGNOSIS — M85.89 OSTEOPENIA OF MULTIPLE SITES: ICD-10-CM

## 2019-06-25 DIAGNOSIS — L97.519 SKIN ULCERS OF BOTH FEET: ICD-10-CM

## 2019-06-25 DIAGNOSIS — I78.1 TELANGIECTASIA: ICD-10-CM

## 2019-06-25 DIAGNOSIS — I73.00 RAYNAUD'S DISEASE WITHOUT GANGRENE: ICD-10-CM

## 2019-06-25 DIAGNOSIS — I27.29 PULMONARY HYPERTENSION ASSOCIATED WITH SYSTEMIC DISORDER: Chronic | ICD-10-CM

## 2019-06-25 DIAGNOSIS — M34.9 SCLERODERMA: Primary | ICD-10-CM

## 2019-06-25 PROCEDURE — 99214 OFFICE O/P EST MOD 30 MIN: CPT | Mod: S$PBB,GC,, | Performed by: INTERNAL MEDICINE

## 2019-06-25 PROCEDURE — 99999 PR PBB SHADOW E&M-EST. PATIENT-LVL III: ICD-10-PCS | Mod: PBBFAC,GC,, | Performed by: INTERNAL MEDICINE

## 2019-06-25 PROCEDURE — 99999 PR PBB SHADOW E&M-EST. PATIENT-LVL III: CPT | Mod: PBBFAC,GC,, | Performed by: INTERNAL MEDICINE

## 2019-06-25 PROCEDURE — 99214 PR OFFICE/OUTPT VISIT, EST, LEVL IV, 30-39 MIN: ICD-10-PCS | Mod: S$PBB,GC,, | Performed by: INTERNAL MEDICINE

## 2019-06-25 PROCEDURE — 99213 OFFICE O/P EST LOW 20 MIN: CPT | Mod: PBBFAC | Performed by: INTERNAL MEDICINE

## 2019-06-25 RX ORDER — ERGOCALCIFEROL 1.25 MG/1
50000 CAPSULE ORAL
Qty: 12 CAPSULE | Refills: 1 | Status: SHIPPED | OUTPATIENT
Start: 2019-06-25 | End: 2019-08-20 | Stop reason: SDUPTHER

## 2019-06-25 RX ORDER — MYCOPHENOLATE MOFETIL 500 MG/1
TABLET ORAL
Qty: 120 TABLET | Refills: 2 | Status: SHIPPED | OUTPATIENT
Start: 2019-06-25 | End: 2019-07-15

## 2019-06-25 ASSESSMENT — ROUTINE ASSESSMENT OF PATIENT INDEX DATA (RAPID3)
TOTAL RAPID3 SCORE: 5.44
PSYCHOLOGICAL DISTRESS SCORE: 3.3
AM STIFFNESS SCORE: 0, NO
MDHAQ FUNCTION SCORE: 1
FATIGUE SCORE: 4
PAIN SCORE: 6.5
PATIENT GLOBAL ASSESSMENT SCORE: 6.5

## 2019-06-25 NOTE — TELEPHONE ENCOUNTER
LVM for callback to inform patient that [facility] received prescription for Mycophenolate and benefits investigation is required.  [facility] will be back in touch once insurance determination is received.

## 2019-06-25 NOTE — PROGRESS NOTES
I have personally reviewed the history, confirmed exam findings, and discussed assessment and plan with fellow.

## 2019-06-25 NOTE — PATIENT INSTRUCTIONS
Start Cellcept ( mycophenolate mofetil)     Labs every 2 weeks for 1 month and then CBC and CMP monthly    Setup appt with Dr Claudio

## 2019-06-25 NOTE — PROGRESS NOTES
"Subjective:       Patient ID: Yamel Shah is a 68 y.o. female.    Chief Complaint: Disease Management    HPI   67YF with Systemic scleroderma, diagnosed 1983- She has been seeing Dr. Ahuja for over 10 years.( +SSA, +SCL-70, -RNAP3, ILD). She has stable ILD on imaging 9/2015 and had mild exercise induced pulmonary hypertension in 2013 that has resolved on 6/2015 RHC and most recent ECHO done 3/2019 shows normal EF with grade 2 diastolic dysfunction, increased sPAP 41 compared to 28mmHg in 06/2018. PFTs 4/2016 showed normal DLCO, RV, FEV1 with mild restriction. Repeat PFTs 03/2018 show normal DLco, decreased RV (89-->64), SVC remains the same at 70 and FVC 70. Repeat PFTs 03/2019 showed FVC 60, SVC 64, RV 61, DLco 82.     Since last visit 03/2019, CT chest/abd was done which showed stable appearing chronic interstitial lung disease consistent with patient's known history of scleroderma; there is new tree-in-bud opacity noted in the right upper lobe which while nonspecific can be seen in the setting of inflammatory infectious processes such as aspiration. Pt was referred to Dr Barry for evaluation of ILD.   As per Dr Barry note " Patient with significant scleroderma multiorgan involvement, Her PFTs reveal trend towards decrease in FVC,A 6MWT is not available at this time,Patient does have poor exercise function because of pain in the feet when she ambulates.  - She may benefit from starting on Immunosuppression with mycophenolate, although risks and benefit need to be reviewed further since she has had scleroderma since 1983. We initiated and provided the patient with basic information on the medication. She is going to review with Dr. Ahuja as well.   - Recommend to repeat PFTs w/DLCO and 6MWT (If patient can tolerate) every 6 months. May enroll in Pulmonary rehab once leg ulcers improve.    Seen by Dr Lim 03/2019 as per note "Given appearance of diastolic dysfxn on echo today and uncontrolled " "HTN/swelling, will go ahead and add aldactone 25mg daily today- needs BMP/BNP this am and again in 1 wk,no PH medicine changes today. From a PH standpoint, based on her echo today and last RHC she appears to be doing very well- PFTs to be repeated per Dr Ahuja, chest imaging stable"    S/p EGD and C-scope as part of workup for anemia (report below). She states that the leg ulcers seem to be slowly healing, she had US venous done for further workup of her LLE ulcers. (Report below). Planning on going to [place] to visit her daughter    Pt denies fever, chills, CP, changes in bowel/bladder habits, new digital ulcers on fingertips, nausea/vomiting, hair loss, joint pain/swelling.     Clinic visit 2019  Pt reports that pain meds were adjusted with Lyrica was increased to 150mg TID which is helping with her symptoms although only taking Lyrica 150mg daily. He recommended Vitamin B supplements which she started taking and noticed that the ulcers are starting to heal faster. ( 3 out of 7 ulcers have healed).     Pt c/o several months history of RUQ abd pain worse with bending, better with rest, no association with food, no nausea/vomiting or changes in bowel habits.    Seen by GI 2019 with plans for EGD/C-scope 19 for further workup of ROXANNE. Pt reports that she was doing well with breathing until this AM where she felt more SOB and NEW. Recently came back from [place] for a  and thinks it might be related to the weather changes.      Pt was referred to vascular surgery on last visit for EVLT of ulcers however cancelled appointment. She had seen Vascular surgery in 2018 who will consider EVLT once ulcers have healed.  Pt is currently on nifedipine 90mg, pravastatin 20mg and adcirca 40mg      Recently retired in Dec 2018, worked as a .       Clinic visit 2018  pt was weaned off gabapentin and placed on tramadol by pain management which has been discontinued due to intolerance and is now on " "Kirill. Seen by Dr Lim with repeat 2D ECHO with normal EF ,sPAP 28. "From a PH standpoint, based on her echo today and last RHC she appears to be doing very well- PFTs also surprisingly good, chest imaging stable"  Pt's main compliant is the ulcers on her feet. Pt reports compliance with adcirca 40 mg and nifedipine 90mg daily.    Previous hx  Pt was seen in clinic on 2/22/18 and found to have a significant ROXANNE, hb 5 which required hospital admission and 2 units of pRBCs. Her anemia has resolved, she is currently on iron 325 tid. Pt will undergo gi w/u with EGD given anemia and dysphagia. 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   In the past pt's digital ulcerations required admission and treatment with epoprostenol (6/2015 and Sept/2016). She does not feel epo helped with the wounds. She is on adcirca 40 mg  and nifedipine 90mg daily. She did not qualify for Bosentan in the past.       Review of Systems   Constitutional: Negative for chills and fever.   HENT: Negative for dental problem, mouth sores and trouble swallowing.    Eyes: Negative for visual disturbance.   Respiratory: Negative for chest tightness and shortness of breath.    Cardiovascular: Negative for chest pain and leg swelling.   Gastrointestinal: Negative for abdominal pain, blood in stool, diarrhea, nausea and vomiting.   Endocrine: Negative for polyuria.   Genitourinary: Negative for difficulty urinating, dysuria, hematuria and urgency.   Musculoskeletal: Negative for arthralgias, joint swelling and neck pain.   Skin: Positive for color change and wound. Negative for rash.   Neurological: Negative for dizziness, weakness and numbness.   Psychiatric/Behavioral: Negative for decreased concentration and sleep disturbance.         Objective:   /60   Pulse 72   Ht 5' 3.6" (1.615 m)   Wt 73 kg (160 lb 14.4 oz)   BMI 27.97 kg/m²      Physical Exam   Constitutional: She is oriented to person, place, and time and well-developed, " well-nourished, and in no distress.   HENT:   Head: Normocephalic and atraumatic.   Eyes: EOM are normal. Pupils are equal, round, and reactive to light.   Neck: Normal range of motion. Neck supple.   Cardiovascular: Normal rate and regular rhythm.    Pulmonary/Chest: Effort normal.   Bibasilar crackles    Abdominal: Soft. Bowel sounds are normal.   Neurological: She is alert and oriented to person, place, and time.   Skin: Skin is warm and dry.     Psychiatric: Affect normal.   Musculoskeletal: Normal range of motion. She exhibits deformity. She exhibits no edema or tenderness.   mRSS 28  Telangiectasias on the face and upper chest wall  Skin tightening b/l  Claw hand deformity. Flexion contractures b/l.                                            Results for DANA GARCÍA (MRN [id number]) as of 6/25/2019 08:28   Ref. Range 6/21/2019 11:01   WBC Latest Ref Range: 3.90 - 12.70 K/uL 6.13   RBC Latest Ref Range: 4.00 - 5.40 M/uL 4.19   Hemoglobin Latest Ref Range: 12.0 - 16.0 g/dL 13.3   Hematocrit Latest Ref Range: 37.0 - 48.5 % 41.6   MCV Latest Ref Range: 82 - 98 fL 99 (H)   MCH Latest Ref Range: 27.0 - 31.0 pg 31.7 (H)   MCHC Latest Ref Range: 32.0 - 36.0 g/dL 32.0   RDW Latest Ref Range: 11.5 - 14.5 % 14.7 (H)   Platelets Latest Ref Range: 150 - 350 K/uL 198   MPV Latest Ref Range: 9.2 - 12.9 fL 13.3 (H)   Gran% Latest Ref Range: 38.0 - 73.0 % 66.1   Gran # (ANC) Latest Ref Range: 1.8 - 7.7 K/uL 4.1   Lymph% Latest Ref Range: 18.0 - 48.0 % 26.4   Lymph # Latest Ref Range: 1.0 - 4.8 K/uL 1.6   Mono% Latest Ref Range: 4.0 - 15.0 % 6.0   Mono # Latest Ref Range: 0.3 - 1.0 K/uL 0.4   Eosinophil% Latest Ref Range: 0.0 - 8.0 % 0.8   Eos # Latest Ref Range: 0.0 - 0.5 K/uL 0.1   Basophil% Latest Ref Range: 0.0 - 1.9 % 0.5   Baso # Latest Ref Range: 0.00 - 0.20 K/uL 0.03   nRBC Latest Ref Range: 0 /100 WBC 0   Differential Method Unknown Automated   Immature Grans (Abs) Latest Ref Range: 0.00 - 0.04 K/uL 0.01    Immature Granulocytes Latest Ref Range: 0.0 - 0.5 % 0.2     Results for DANA GARCÍA (MRN [id number]) as of 6/25/2019 08:28   Ref. Range 6/21/2019 11:01   Sodium Latest Ref Range: 136 - 145 mmol/L 139   Potassium Latest Ref Range: 3.5 - 5.1 mmol/L 4.0   Chloride Latest Ref Range: 95 - 110 mmol/L 107   CO2 Latest Ref Range: 23 - 29 mmol/L 21 (L)   Anion Gap Latest Ref Range: 8 - 16 mmol/L 11   BUN, Bld Latest Ref Range: 8 - 23 mg/dL 14   Creatinine Latest Ref Range: 0.5 - 1.4 mg/dL 0.7   eGFR if non African American Latest Ref Range: >60 mL/min/1.73 m^2 >60.0   eGFR if African American Latest Ref Range: >60 mL/min/1.73 m^2 >60.0   Glucose Latest Ref Range: 70 - 110 mg/dL 87   Calcium Latest Ref Range: 8.7 - 10.5 mg/dL 9.1   Alkaline Phosphatase Latest Ref Range: 55 - 135 U/L 107   PROTEIN TOTAL Latest Ref Range: 6.0 - 8.4 g/dL 8.5 (H)   Albumin Latest Ref Range: 3.5 - 5.2 g/dL 3.6   BILIRUBIN TOTAL Latest Ref Range: 0.1 - 1.0 mg/dL 0.3   AST Latest Ref Range: 10 - 40 U/L 19   ALT Latest Ref Range: 10 - 44 U/L 10     Results for DANA GARCÍA (MRN [id number]) as of 6/25/2019 08:28   Ref. Range 2/21/2018 11:12 2/21/2018 11:12 3/12/2019 11:04 6/21/2019 11:01   Sed Rate Latest Ref Range: 0 - 36 mm/Hr 61 (H) 61 (H) 46 (H) 52 (H)     Results for DANA GARCÍA (MRN [id number]) as of 6/25/2019 08:28   Ref. Range 2/21/2018 11:12 3/12/2019 11:04 6/21/2019 11:01   CRP Latest Ref Range: 0.0 - 8.2 mg/L 4.1 34.0 (H) 13.0 (H)     Results for DANA GARCÍA (MRN [id number]) as of 7/24/2018 13:08   Ref. Range 11/10/2009 16:40 1/7/2015 12:33   Anti-SSA Antibody Latest Units: .22 (A)    Anti-SSA Interpretation Unknown Positive (A)    Anti-SSB Antibody Latest Units: EU 16.46    Anti-SSB Interpretation Unknown Negative    ds DNA Ab Latest Ref Range: Negative Titer Negative    Scleroderma SCL- Latest Ref Range: <20 UNITS  133 (H)   Complement (C-3) Latest Ref Range: 50 - 180 mg/dl 87    Complement (C-4) Latest  Ref Range: 11 - 44 mg/dl 10 (L)    Complement,Total, Serum Latest Ref Range: 30 - 75 U/mL 43       Results for DANA GARCÍA (MRN [id number]) as of 7/24/2018 13:08   Ref. Range 1/13/2017 08:20   IgG - Serum Latest Ref Range: 650 - 1600 mg/dL 2625 (H)   IgM Latest Ref Range: 50 - 300 mg/dL 121   IgA Latest Ref Range: 40 - 350 mg/dL 487 (H)         SPEP 1/2017  Electronically reviewed and signed by:   Camila Soliman M.D.   Signed on 01/16/17 at 15:54   Increased total protein. Increased gamma globulin, polyclonal.      US Venous 06/2019  Impression:   Right Leg:  Color flow evaluation of the lower extremity demonstrates fully compressible and patent veins; however, due to heavy pitting edema, one of the paired peroneal veins cannot be appreciated which could suggest chronic occlusive thrombus versus   poor imaging. There is no evidence of venous thrombosis in the remaining deep or superficial veins.  Significant reflux is noted in the GSV including the saphenofemoral junction (SFJ) and the SSV including the saphenopopliteal junction (SPJ). There is no   evidence of reflux in the Accessory vein. Many branches are noted throughout the entire course of the GSV and SSV.  Incidental finding of significant reflux is noted in the FV and POP V.    Left Leg:  Color flow evaluation of the lower extremity demonstrates chronic occlusive thrombus in a small segment of the GSV BK. There is no evidence of venous thrombosis in the remaining deep or superficial veins.  Significant reflux is noted in the   GSV including the saphenofemoral junction (SFJ) and the SSV including the saphenopopliteal junction (SPJ). There is no evidence of reflux in the Accessory vein. Noted is a diameter decrease in the GSV from 4.8 mm to 2.6 mm at the level of the distal   thigh and 1.9 mm at the knee.  Multiple branches of the GSV are also noted throughout its entire course. Incidental finding of significant reflux is noted in the FV and POP  V.      EGD 01/2017  Normal esophagus. Two biopsies were obtained in                         the upper third of the esophagus.                        - Z-line irregular. Biopsied.                        - Esophageal polyp(s) were found. Resected and                         retrieved.                        - Normal stomach.                        - Congested duodenal mucosa. Biopsied.  Recommendation:  - Await pathology results.                        - Discharge patient to home (ambulatory).                        - Resume previous diet.                        - Continue present medications.                        - Repeat the upper endoscopy in 6 months to assure                         complete removal of polyp at GE junction.      EGD 03/2019  Findings:       The lumen of the esophagus was moderately dilated.       The Z-line was irregular (1-cm) and was found 38 cm from the        incisors. Four biopsies were taken with a cold forceps for histology.       A 3 cm hiatal hernia was present.       One benign-appearing, intrinsic mild stenosis was found 38 cm from        the incisors. This stenosis measured >2.5 cm (inner diameter) x less        than one cm (in length). The stenosis was traversed.       The entire examined stomach was normal.       A few 3 mm sessile polyps were found in the gastric body. Biopsies        were taken with a cold forceps for histology.       The cardia and gastric fundus were normal on retroflexion.       The examined duodenum was normal. Biopsies for histology were taken        with a cold forceps for evaluation of celiac disease.  Impression:    - Dilation in the entire esophagus.                        - Z-line irregular, 38 cm from the incisors.                         Biopsied.                        - 3 cm hiatal hernia.                        - Benign-appearing esophageal stenosis.                        - Normal stomach.                        - A few gastric polyps.  Biopsied.                        - Normal examined duodenum. Biopsied.  Recommendation:  - Await pathology results.                        - Discharge patient to home (with escort).                        - Resume previous diet.    C-scope 03/2019  Findings:       The perianal and digital rectal examinations were normal.       Five sessile polyps were found in the transverse colon. The polyps        were 2 to 5 mm in size. These polyps were removed with a jumbo cold        forceps. Resection and retrieval were complete.       The colon (entire examined portion) appeared normal.       The retroflexed view of the distal rectum and anal verge was normal        and showed no anal or rectal abnormalities.       The terminal ileum appeared normal.       Non-bleeding internal hemorrhoids were found during retroflexion.        The hemorrhoids were large.  Impression:     - Five 2 to 5 mm polyps in the transverse colon,                         removed with a jumbo cold forceps. Resected and                         retrieved.                        - The entire examined colon is normal.                        - The distal rectum and anal verge are normal on                         retroflexion view.                        - The examined portion of the ileum was normal.  Recommendation:    - Discharge patient to home (with escort).    CT chest/abd 03/2019   FINDINGS:  Thoracic soft tissues: No significant abnormality.    Aorta: Normal in course and caliber, without significant atherosclerotic plaque. There are three branching vessels at the arch.    Heart: Normal in size. No pericardial effusion.    Barbara/Mediastinum: No significant lymphadenopathy  the esophagus is mildly dilated and fluid-filled.    Lungs: There is biapical scarring.  In addition, there is chronic changes again noted predominantly in the lung bases where there is cystic change in prominence of the interstitial markings.  In addition there is associated  dilation of the small airways in this location.  Multiple scattered foci of tree-in-bud nodularity are noted throughout the lungs (for example in the right upper lobe series 2, image 338).  There is a 4 mm pulmonary nodule in the right upper lobe (series 2, image 14) which was seen on the 2015 examination..    Liver: Normal in size and attenuation, with no focal hepatic lesions.    Gallbladder: No calcified gallstones.    Bile Ducts: No evidence of dilated ducts.    Pancreas: No mass or peripancreatic fat stranding.    Spleen: Unremarkable.    Adrenals: Unremarkable.    Kidneys/ Ureters: Normal in size and location. Normal concentration and excretion of contrast. No hydronephrosis or nephrolithiasis. No ureteral dilatation.    The visualized portion of the GI tract is grossly unremarkable with no evidence of bowel wall thickening or inflammation.    Bones: No acute fracture.      Impression       Stable appearing chronic interstitial lung disease consistent with patient's known history of scleroderma; it is worth noting that there is new tree-in-bud opacity noted in the right upper lobe which while nonspecific can be seen in the setting of inflammatory infectious processes such as aspiration.    Persistently fluid-filled esophagus which can place the patient at risk for aspiration           DEXA 09/2018  Osteopenia. There is a 11.9% risk of a major osteoporotic fracture and a 2.6% risk of hip fracture in the next 10 years (FRAX).  Compared with previous DXA, BMD at the lumbar spine has remained stable, and the BMD at the total hip has decreased by -7.4%    2D ECHO 03/2019  · Normal left ventricular systolic function. The estimated ejection fraction is 60%  · Grade II (moderate) left ventricular diastolic dysfunction consistent with pseudonormalization.  · Elevated left atrial pressure.  · No wall motion abnormalities.  · Normal right ventricular systolic function.  · Mild left atrial enlargement.  · Mild mitral  regurgitation.  · The estimated PA systolic pressure is 41 mm Hg  · Normal central venous pressure (3 mm Hg).      2D ECHO 06/2018  CONCLUSIONS     1 - Upper limit of normal left ventricular enlargement.     2 - Normal left ventricular systolic function (EF 60-65%).     3 - No wall motion abnormalities.     4 - Normal left ventricular diastolic function.     5 - Biatrial enlargement.     6 - Normal right ventricular systolic function .     7 - Trivial mitral regurgitation.     8 - Mild tricuspid regurgitation.     9 - Trivial pulmonic regurgitation.     10 - The estimated PA systolic pressure is 28 mmHg.     CT chest 09/2015  Findings:  Examination of the vascular and soft tissue structures at the base of the neck is unremarkable.  The thoracic aorta maintains normal caliber, contour, and course without significant atherosclerotic calcification within its course.  The heart is not enlarged and there is no evidence of pericardial effusion. The esophagus is mildly dilated and   retained pneumatized secretions are identified within the dependent portion of the esophagus placing the patient at risk for aspiration and also suggestive esophageal dysmotility associated with scleroderma or reflux. There is no axillary, mediastinal,   or hilar lymphadenopathy.    A benign appearing but enlarged left axillary lymph node is identified, which appears stable to the prior examination dated 11/27/07.  Scattered normal sized and benign appearing mediastinal lymphadenopathy is also appreciated, also grossly similar to   the prior examination.     The lungs again are symmetrically expanded and demonstrate chronic generalized lung disease with small opacities and reticulations in a peripheral predominance.  This again is most notable in the lung bases were there is dilatation of small airways and faint groundglass opacity.  Findings appear grossly stable to the prior chest CT in 2007 and are consistent with interstitial lung disease  as can be seen with scleroderma, pulmonary fibrosis or interstitial pneumonitis.  No focal mass lesion is identified.Limited views of the abdomen demonstrate nothing unusual on this noncontrast examination.  The osseous structures demonstrate mild degenerative changes and a stable appearing compression deformity of the T9 vertebral body.      PFTs       FVC     SVC      RV     DLco  3/12/19   60         64         61        82  3/6/18      64         70         64      112  4/8/16      70         70         89       84  9/4/15      66         62         72       71  1/19/15    66           2/14/14    64                                 87  Assessment:       1. Scleroderma    2. ILD (interstitial lung disease)    3. Telangiectasia    4. Skin ulcers of both feet    5. Osteopenia of multiple sites    6. Vitamin D deficiency    7. Pulmonary hypertension associated with systemic disorder    8. Raynaud's disease without gangrene    9. Gastroesophageal reflux disease, esophagitis presence not specified            Plan:         Diagnoses and all orders for this visit:    Scleroderma ( +scl-70, neg RNAP3, Raynauds, ILD, GERD, sclerodactyly with claw hand + telangiectasias, exercise induced pHTN (resolved 6/2015 RHC) )  mRSS 31---> 28 c/w moderate activity since last visit. Most recent Lung volumes DLco wnl however decrease in RV, thus CT chest was done which showed stable ILD changes with non specific new tree in bud RUL which could be related to aspiration  Most recent labs show normal WBC, normal Hb and platelet wnl. Metabolic profile with normal renal and liver function. ESR 46--> 54,CRP 34-->13  Continue Nifedipine and adcirca  Continue statin  After evalaution by pulmonary Dr Barry, recommends that pt may benefit from Cellcept due to downward trend in FVC.   Pre-DMARD labs today  Start Cellcept 500mg BID for 1 week and then increase to 1000mg BID to prevent progression of ILD. If pt unable to tolerate pills, will  switch to liquid formulation  Discussed risks and benefits of medication, pt voiced understanding and all questions were answered.  Hand out given on Cellcept  CBC in 2 weeks for 1 month then CBC CMP in 1 month  Labs prior to next clinic visit  -     Sedimentation rate; Standing  -     CBC auto differential; Standing  -     C-reactive protein; Standing  -     Comprehensive metabolic panel; Standing        Telangiectasia    ILD (interstitial lung disease)  #See above    Pulmonary hypertension associated with systemic disorder  Exercise induced Pulmonary hypertension RHC 1/13- resolved on RHC 6/2015.  Stable. Doesn't qualify for Bosentan. She is on Adcirca 40mg daily  2D ECHO done 3/2019 shows increased sPAP 28-->41mmg with diastolic dysfunction and normal EF.  F/u with Dr Lim      Osteopenia, unspecified location  DEXA 09/2018  FRAX 11.9% MOF, HF 2.6%  Continue Vit D and Ca supplements    Skin ulcers of both feet  Re-refer to Vascular for further evaluation for consideration of EVLT. Keep appt with Dr Claudio  Continue Lyrica 150mg, pt is currently taking once daily  Continue Vitamin B supplements      Gastroesophageal reflux disease, esophagitis presence not specified  Continue prilosec and rantidine  F/u GI        UTD with immunizations

## 2019-06-26 ENCOUNTER — PATIENT OUTREACH (OUTPATIENT)
Dept: OTHER | Facility: OTHER | Age: 69
End: 2019-06-26

## 2019-06-26 ENCOUNTER — LAB VISIT (OUTPATIENT)
Dept: LAB | Facility: HOSPITAL | Age: 69
End: 2019-06-26
Payer: MEDICARE

## 2019-06-26 DIAGNOSIS — Z11.4 ENCOUNTER FOR SCREENING FOR HIV: ICD-10-CM

## 2019-06-26 DIAGNOSIS — M34.9 SCLERODERMA: ICD-10-CM

## 2019-06-26 PROCEDURE — 86790 VIRUS ANTIBODY NOS: CPT

## 2019-06-26 PROCEDURE — 36415 COLL VENOUS BLD VENIPUNCTURE: CPT

## 2019-06-26 PROCEDURE — 86704 HEP B CORE ANTIBODY TOTAL: CPT

## 2019-06-26 PROCEDURE — 87340 HEPATITIS B SURFACE AG IA: CPT

## 2019-06-26 PROCEDURE — 86706 HEP B SURFACE ANTIBODY: CPT

## 2019-06-26 PROCEDURE — 86592 SYPHILIS TEST NON-TREP QUAL: CPT

## 2019-06-26 PROCEDURE — 86787 VARICELLA-ZOSTER ANTIBODY: CPT

## 2019-06-26 PROCEDURE — 86803 HEPATITIS C AB TEST: CPT

## 2019-06-26 PROCEDURE — 86703 HIV-1/HIV-2 1 RESULT ANTBDY: CPT

## 2019-06-26 NOTE — PROGRESS NOTES
Last 5 Patient Entered Readings                                      Current 30 Day Average: 127/64     Recent Readings 6/24/2019 6/23/2019 6/22/2019 6/18/2019 6/17/2019    SBP (mmHg) 123 155 114 128 117    DBP (mmHg) 58 69 61 62 55    Pulse 91 68 87 83 79            Digital Medicine: Health  Follow Up    Left voicemail to follow up with Ms. Yamel Shah.  Current BP average 127/64 mmHg is at goal, 130/80

## 2019-06-26 NOTE — PROGRESS NOTES
Last 5 Patient Entered Readings                                      Current 30 Day Average: 127/64     Recent Readings 6/24/2019 6/23/2019 6/22/2019 6/18/2019 6/17/2019    SBP (mmHg) 123 155 114 128 117    DBP (mmHg) 58 69 61 62 55    Pulse 91 68 87 83 79          Patient RCB to complete HC f/u.

## 2019-06-27 LAB
HBV CORE AB SERPL QL IA: NEGATIVE
HBV SURFACE AB SER-ACNC: NEGATIVE M[IU]/ML
HBV SURFACE AG SERPL QL IA: NEGATIVE
HCV AB SERPL QL IA: NEGATIVE
HEPATITIS A ANTIBODY, IGG: POSITIVE
HIV 1+2 AB+HIV1 P24 AG SERPL QL IA: NEGATIVE
RPR SER QL: NORMAL
STRONGYLOIDES ANTIBODY IGG: NEGATIVE
VARICELLA INTERPRETATION: POSITIVE
VARICELLA ZOSTER IGG: 3.56 ISR (ref 0–0.9)

## 2019-06-29 ENCOUNTER — PATIENT MESSAGE (OUTPATIENT)
Dept: RHEUMATOLOGY | Facility: CLINIC | Age: 69
End: 2019-06-29

## 2019-07-02 NOTE — TELEPHONE ENCOUNTER
DOCUMENTATION ONLY:  Prior authorization for Mycophenolate not required.    Co-pay: $5.00    Patient Assistance is not required.

## 2019-07-03 NOTE — PROGRESS NOTES
Last 5 Patient Entered Readings                                      Current 30 Day Average: 125/65     Recent Readings 7/2/2019 7/1/2019 6/30/2019 6/29/2019 6/27/2019    SBP (mmHg) 120 114 118 122 117    DBP (mmHg) 64 63 66 70 77    Pulse 77 86 79 74 95            Digital Medicine: Health  Follow Up    Left voicemail to follow up with Ms. Yamel Shah.  Current BP average 125/65 mmHg is at goal, 130/80. Sent [employer] message.

## 2019-07-05 DIAGNOSIS — M34.9 SCLERODERMA: ICD-10-CM

## 2019-07-05 DIAGNOSIS — E78.5 HYPERLIPIDEMIA: ICD-10-CM

## 2019-07-05 RX ORDER — PRAVASTATIN SODIUM 20 MG/1
TABLET ORAL
Qty: 90 TABLET | Refills: 0 | Status: SHIPPED | OUTPATIENT
Start: 2019-07-05 | End: 2019-10-06 | Stop reason: SDUPTHER

## 2019-07-09 ENCOUNTER — TELEPHONE (OUTPATIENT)
Dept: TRANSPLANT | Facility: CLINIC | Age: 69
End: 2019-07-09

## 2019-07-10 ENCOUNTER — TELEPHONE (OUTPATIENT)
Dept: TRANSPLANT | Facility: CLINIC | Age: 69
End: 2019-07-10

## 2019-07-15 ENCOUNTER — PATIENT OUTREACH (OUTPATIENT)
Dept: OTHER | Facility: OTHER | Age: 69
End: 2019-07-15

## 2019-07-15 RX ORDER — MYCOPHENOLATE MOFETIL 200 MG/ML
500 POWDER, FOR SUSPENSION ORAL 2 TIMES DAILY
Qty: 320 ML | Refills: 1 | Status: SHIPPED | OUTPATIENT
Start: 2019-07-15 | End: 2019-08-20 | Stop reason: SDUPTHER

## 2019-07-15 NOTE — PROGRESS NOTES
HPI:  Patient returned call for digital medicine follow-up. She reports adherence to medication with no complaints. Prescribed mycophenolate however, unable to start new medication due to difficulty swallowing. Will discuss changing to liquid and wanted to make me aware of new medication.     Last 5 Patient Entered Readings                                      Current 30 Day Average: 125/64     Recent Readings 7/12/2019 7/9/2019 7/5/2019 7/2/2019 7/1/2019    SBP (mmHg) 116 131 139 120 114    DBP (mmHg) 59 65 67 64 63    Pulse 84 67 74 77 86          Patient denies s/s of hypotension (lightheadedness, dizziness, nausea, fatigue) associated with low readings. Instructed patient to inform me if this occurs, patient confirms understanding.    Patient denies s/s of hypertension (SOB, CP, severe headaches, changes in vision) associated with high readings. Instructed patient to go to the ED if BP >180/110 and accompanied by hypertensive s/s, patient confirms understanding.    Assessment:  Patient's current 30-day average is at goal of <130/80 mmHg.       Plan:  Continue current regimen.   Patients health , Melany Salazar, will follow-up as scheduled.    I will continue to monitor regularly and will follow-up in 12 weeks, sooner if blood pressure begins to trend upward or downward.     Current medication regimen:  Hypertension Medications             NIFEdipine (ADALAT CC) 90 MG TbSR TAKE 1 TABLET(90 MG) BY MOUTH EVERY DAY    spironolactone (ALDACTONE) 25 MG tablet Take 1 tablet (25 mg total) by mouth once daily.        Patient has my contact information and knows to call with any concerns or clinical changes.

## 2019-07-23 NOTE — PROGRESS NOTES
Last 5 Patient Entered Readings                                      Current 30 Day Average: 127/65     Recent Readings 7/20/2019 7/19/2019 7/17/2019 7/16/2019 7/12/2019    SBP (mmHg) 118 148 104 153 116    DBP (mmHg) 60 71 50 75 59    Pulse 74 58 75 70 84            Digital Medicine: Health  Follow Up    Left voicemail to follow up with Ms. Yamel Shah.  Current BP average 127/65 mmHg is at goal, 130/80

## 2019-07-25 ENCOUNTER — TELEPHONE (OUTPATIENT)
Dept: PHARMACY | Facility: CLINIC | Age: 69
End: 2019-07-25

## 2019-07-26 ENCOUNTER — TELEPHONE (OUTPATIENT)
Dept: PHARMACY | Facility: CLINIC | Age: 69
End: 2019-07-26

## 2019-07-26 NOTE — TELEPHONE ENCOUNTER
Initial Mycophenolate 200mg/ml suspension consult completed on . $15 copay. Patient intends to start Mycophenolate on .  Confirmed 2 patient identifiers - name and . Therapy Appropriate.      Patient was counseled on the administration directions for Mycophenolate suspension:  -Take 2.5ml twice daily for 7 days, increase to 5 ml twice daily thereafter.  -Should be taken on an empty stomach (1 hour before or 2 hours after meals) but can be taken with food when necessary to decrease gastrointestinal side effects       Patient was counseled on the following possible side effects, which include, but are not limited to:   · GI: Abdominal pain (25% to 63%), nausea (20% to 55%), diarrhea (31% to 51%), constipation (19% to 41%), vomiting (33% to 34%), anorexia (25%), dyspepsia (22%)  · Hematologic & oncologic: Leukopenia (23% to 46%), anemia (26% to 43%), leukocytosis (22% to 41%), thrombocytopenia (24% to 38%), hypochromic anemia (25%) increased risk of malignancies (particularly of skin)  · Cardiovascular: Hyper/hypotension, edema, chest pain (26%), tachycardia (20% to 22%)  · Central nervous system: Pain (31% to 76%), headache (16% to 54%), insomnia (41% to 52%), dizziness (29%), anxiety (28%), paresthesia (21%)  · Dermatologic: Skin rash (22%)  · Endocrine & metabolic: Hyperglycemia (44% to 47%), hypercholesterolemia (41%), hypomagnesemia (39%), hypokalemia (32% to 37%), hypocalcemia (30%), increased lactate dehydrogenase (23%), hyperkalemia (22%)  · Genitourinary: Urinary tract infection (37%)  · Hepatic: Abnormal hepatic function tests (25%), ascites (24%)  · Infection: Sepsis (27%), infection (18% to 27%), candidiasis (17% to 22%), herpes simplex infection (10% to 21%)  · Neuromuscular & skeletal: Back pain (35% to 47%), weakness (35% to 43%), tremor (24% to 34%)  · Renal: Increased serum creatinine (39%), increased blood urea nitrogen (35%), renal function abnormality (22% to 26%)  · Respiratory: Dyspnea  (31% to 37%), respiratory tract infection (22% to 37%), pleural effusion (34%), cough (31%), pulmonary disease (22% to 30%), sinusitis (26%)  · Miscellaneous: Fever (21% to 52%)      Patient verbalized understanding. Consultation included: indication; goals of treatment; administration; storage and handling; side effects; how to handle side effects; the importance of compliance; how to handle missed doses; the importance of laboratory monitoring; the importance of keeping all follow up appointments. Mycophenolate REMS Medication Guide reviewed. Patient understands to report any medication changes to [facility] and provider.  All questions answered and addressed to patients satisfaction. I will f/u with patient in 1 week from start, [facility] to contact patient in 3 weeks for refills.

## 2019-08-02 ENCOUNTER — LAB VISIT (OUTPATIENT)
Dept: LAB | Facility: HOSPITAL | Age: 69
End: 2019-08-02
Attending: INTERNAL MEDICINE
Payer: MEDICARE

## 2019-08-02 DIAGNOSIS — M34.9 SCLERODERMA: ICD-10-CM

## 2019-08-02 LAB
ALBUMIN SERPL BCP-MCNC: 3.6 G/DL (ref 3.5–5.2)
ALP SERPL-CCNC: 109 U/L (ref 55–135)
ALT SERPL W/O P-5'-P-CCNC: 12 U/L (ref 10–44)
ANION GAP SERPL CALC-SCNC: 10 MMOL/L (ref 8–16)
AST SERPL-CCNC: 21 U/L (ref 10–40)
BASOPHILS # BLD AUTO: 0.02 K/UL (ref 0–0.2)
BASOPHILS NFR BLD: 0.4 % (ref 0–1.9)
BILIRUB SERPL-MCNC: 0.3 MG/DL (ref 0.1–1)
BUN SERPL-MCNC: 14 MG/DL (ref 8–23)
CALCIUM SERPL-MCNC: 8.7 MG/DL (ref 8.7–10.5)
CHLORIDE SERPL-SCNC: 107 MMOL/L (ref 95–110)
CO2 SERPL-SCNC: 20 MMOL/L (ref 23–29)
CREAT SERPL-MCNC: 0.8 MG/DL (ref 0.5–1.4)
DIFFERENTIAL METHOD: ABNORMAL
EOSINOPHIL # BLD AUTO: 0 K/UL (ref 0–0.5)
EOSINOPHIL NFR BLD: 0.9 % (ref 0–8)
ERYTHROCYTE [DISTWIDTH] IN BLOOD BY AUTOMATED COUNT: 14.9 % (ref 11.5–14.5)
EST. GFR  (AFRICAN AMERICAN): >60 ML/MIN/1.73 M^2
EST. GFR  (NON AFRICAN AMERICAN): >60 ML/MIN/1.73 M^2
GLUCOSE SERPL-MCNC: 110 MG/DL (ref 70–110)
HCT VFR BLD AUTO: 40.6 % (ref 37–48.5)
HGB BLD-MCNC: 13.2 G/DL (ref 12–16)
IMM GRANULOCYTES # BLD AUTO: 0.01 K/UL (ref 0–0.04)
IMM GRANULOCYTES NFR BLD AUTO: 0.2 % (ref 0–0.5)
LYMPHOCYTES # BLD AUTO: 1.4 K/UL (ref 1–4.8)
LYMPHOCYTES NFR BLD: 31.6 % (ref 18–48)
MCH RBC QN AUTO: 32.3 PG (ref 27–31)
MCHC RBC AUTO-ENTMCNC: 32.5 G/DL (ref 32–36)
MCV RBC AUTO: 99 FL (ref 82–98)
MONOCYTES # BLD AUTO: 0.4 K/UL (ref 0.3–1)
MONOCYTES NFR BLD: 8.1 % (ref 4–15)
NEUTROPHILS # BLD AUTO: 2.7 K/UL (ref 1.8–7.7)
NEUTROPHILS NFR BLD: 58.8 % (ref 38–73)
NRBC BLD-RTO: 0 /100 WBC
PLATELET # BLD AUTO: 204 K/UL (ref 150–350)
PMV BLD AUTO: 13.3 FL (ref 9.2–12.9)
POTASSIUM SERPL-SCNC: 3.7 MMOL/L (ref 3.5–5.1)
PROT SERPL-MCNC: 8.5 G/DL (ref 6–8.4)
RBC # BLD AUTO: 4.09 M/UL (ref 4–5.4)
SODIUM SERPL-SCNC: 137 MMOL/L (ref 136–145)
WBC # BLD AUTO: 4.55 K/UL (ref 3.9–12.7)

## 2019-08-02 PROCEDURE — 80053 COMPREHEN METABOLIC PANEL: CPT

## 2019-08-02 PROCEDURE — 85025 COMPLETE CBC W/AUTO DIFF WBC: CPT

## 2019-08-02 PROCEDURE — 36415 COLL VENOUS BLD VENIPUNCTURE: CPT | Mod: PN

## 2019-08-06 DIAGNOSIS — I27.29 PULMONARY HYPERTENSION ASSOCIATED WITH SYSTEMIC DISORDER: Primary | Chronic | ICD-10-CM

## 2019-08-06 DIAGNOSIS — J84.9 ILD (INTERSTITIAL LUNG DISEASE): ICD-10-CM

## 2019-08-06 DIAGNOSIS — M34.9 SCLERODERMA: ICD-10-CM

## 2019-08-06 NOTE — PROGRESS NOTES
Last 5 Patient Entered Readings                                      Current 30 Day Average: 128/65     Recent Readings 8/5/2019 8/4/2019 8/3/2019 8/2/2019 8/1/2019    SBP (mmHg) 128 113 149 115 120    DBP (mmHg) 66 59 74 64 63    Pulse 96 95 60 79 75            Digital Medicine: Health  Follow Up    Left voicemail to follow up with Ms. Yamel Shah.  Current BP average 128/65 mmHg is at goal, 130/80

## 2019-08-20 ENCOUNTER — OFFICE VISIT (OUTPATIENT)
Dept: RHEUMATOLOGY | Facility: CLINIC | Age: 69
End: 2019-08-20
Payer: MEDICARE

## 2019-08-20 VITALS
BODY MASS INDEX: 25.39 KG/M2 | HEART RATE: 77 BPM | SYSTOLIC BLOOD PRESSURE: 128 MMHG | WEIGHT: 158 LBS | DIASTOLIC BLOOD PRESSURE: 64 MMHG | HEIGHT: 66 IN

## 2019-08-20 DIAGNOSIS — L97.519 SKIN ULCERS OF BOTH FEET: ICD-10-CM

## 2019-08-20 DIAGNOSIS — E55.9 VITAMIN D DEFICIENCY: ICD-10-CM

## 2019-08-20 DIAGNOSIS — I27.29 PULMONARY HYPERTENSION ASSOCIATED WITH SYSTEMIC DISORDER: ICD-10-CM

## 2019-08-20 DIAGNOSIS — M34.9 SCLERODERMA: Primary | ICD-10-CM

## 2019-08-20 DIAGNOSIS — I87.2 VENOUS INSUFFICIENCY OF BOTH LOWER EXTREMITIES: ICD-10-CM

## 2019-08-20 DIAGNOSIS — K21.9 GASTROESOPHAGEAL REFLUX DISEASE, ESOPHAGITIS PRESENCE NOT SPECIFIED: ICD-10-CM

## 2019-08-20 DIAGNOSIS — L97.529 SKIN ULCERS OF BOTH FEET: ICD-10-CM

## 2019-08-20 DIAGNOSIS — M85.89 OSTEOPENIA OF MULTIPLE SITES: ICD-10-CM

## 2019-08-20 DIAGNOSIS — J84.9 ILD (INTERSTITIAL LUNG DISEASE): ICD-10-CM

## 2019-08-20 PROCEDURE — 99213 OFFICE O/P EST LOW 20 MIN: CPT | Mod: PBBFAC | Performed by: STUDENT IN AN ORGANIZED HEALTH CARE EDUCATION/TRAINING PROGRAM

## 2019-08-20 PROCEDURE — 99999 PR PBB SHADOW E&M-EST. PATIENT-LVL III: CPT | Mod: PBBFAC,GC,, | Performed by: STUDENT IN AN ORGANIZED HEALTH CARE EDUCATION/TRAINING PROGRAM

## 2019-08-20 PROCEDURE — 99999 PR PBB SHADOW E&M-EST. PATIENT-LVL III: ICD-10-PCS | Mod: PBBFAC,GC,, | Performed by: STUDENT IN AN ORGANIZED HEALTH CARE EDUCATION/TRAINING PROGRAM

## 2019-08-20 PROCEDURE — 99214 OFFICE O/P EST MOD 30 MIN: CPT | Mod: S$PBB,GC,, | Performed by: STUDENT IN AN ORGANIZED HEALTH CARE EDUCATION/TRAINING PROGRAM

## 2019-08-20 PROCEDURE — 99214 PR OFFICE/OUTPT VISIT, EST, LEVL IV, 30-39 MIN: ICD-10-PCS | Mod: S$PBB,GC,, | Performed by: STUDENT IN AN ORGANIZED HEALTH CARE EDUCATION/TRAINING PROGRAM

## 2019-08-20 RX ORDER — ERGOCALCIFEROL 1.25 MG/1
50000 CAPSULE ORAL
Qty: 12 CAPSULE | Refills: 1 | Status: CANCELLED | OUTPATIENT
Start: 2019-08-20

## 2019-08-20 RX ORDER — ERGOCALCIFEROL 1.25 MG/1
50000 CAPSULE ORAL
Qty: 12 CAPSULE | Refills: 1 | Status: SHIPPED | OUTPATIENT
Start: 2019-08-20 | End: 2020-11-06 | Stop reason: SDUPTHER

## 2019-08-20 RX ORDER — MYCOPHENOLATE MOFETIL 200 MG/ML
1500 POWDER, FOR SUSPENSION ORAL 2 TIMES DAILY
Qty: 480 ML | Refills: 1 | Status: SHIPPED | OUTPATIENT
Start: 2019-08-20 | End: 2019-10-15

## 2019-08-20 ASSESSMENT — ROUTINE ASSESSMENT OF PATIENT INDEX DATA (RAPID3)
PAIN SCORE: 6
MDHAQ FUNCTION SCORE: 1
TOTAL RAPID3 SCORE: 4.78
FATIGUE SCORE: 5
PSYCHOLOGICAL DISTRESS SCORE: 3.3
AM STIFFNESS SCORE: 0, NO
PATIENT GLOBAL ASSESSMENT SCORE: 5

## 2019-08-20 NOTE — PROGRESS NOTES
"Subjective:       Patient ID: Yamel Shah is a 68 y.o. female.    Chief Complaint: Disease Managment    HPI: Follow up for Systemic scleroderma. Last seen by Dr. Ahuja and Dr. Thornton on 6/25/2019.    Interval Hx: Since last visit, pt has started mycophenolate 500 mg BID, increased to 1000 mg BID (liquid formulation=1 tsp BID or 5ml BID). Pt reports increase in energy. She states she has been able to get 8 hrs of sleep now. She also reports looser skin. She states she feels slightly "shaking or anxious" within 1st hr of taking the medication but it goes away quickly. She takes the medicine with food. AM stiffness ~15 min.    67YF with Systemic scleroderma, diagnosed 1983- She has been seeing Dr. Ahuja for over 10 years.( +SSA, +SCL-70, -RNAP3, ILD). She has stable ILD on imaging 9/2015 and had mild exercise induced pulmonary hypertension in 2013 that has resolved on 6/2015 RHC and most recent ECHO done 3/2019 shows normal EF with grade 2 diastolic dysfunction, increased sPAP 41 compared to 28mmHg in 06/2018. PFTs 4/2016 showed normal DLCO, RV, FEV1 with mild restriction. Repeat PFTs 03/2018 show normal DLco, decreased RV (89-->64), SVC remains the same at 70 and FVC 70. Repeat PFTs 03/2019 showed FVC 60, SVC 64, RV 61, DLco 82.      03/2019 CT chest/abd was done which showed stable appearing chronic interstitial lung disease consistent with patient's known history of scleroderma; there is new tree-in-bud opacity noted in the right upper lobe which while nonspecific can be seen in the setting of inflammatory infectious processes such as aspiration. Pt was referred to Dr Barry for evaluation of ILD.   As per Dr Barry note " Patient with significant scleroderma multiorgan involvement, Her PFTs reveal trend towards decrease in FVC,A 6MWT is not available at this time,Patient does have poor exercise function because of pain in the feet when she ambulates.  - She may benefit from starting on " "Immunosuppression with mycophenolate, although risks and benefit need to be reviewed further since she has had scleroderma since . We initiated and provided the patient with basic information on the medication. She is going to review with Dr. Ahuja as well.   - Recommend to repeat PFTs w/DLCO and 6MWT (If patient can tolerate) every 6 months. May enroll in Pulmonary rehab once leg ulcers improve.     Seen by Dr Lim 2019 as per note "Given appearance of diastolic dysfxn on echo today and uncontrolled HTN/swelling, will go ahead and add aldactone 25mg daily today- needs BMP/BNP this am and again in 1 wk,no PH medicine changes today. From a PH standpoint, based on her echo today and last RHC she appears to be doing very well- PFTs to be repeated per Dr Ahuja, chest imaging stable"     S/p EGD and C-scope as part of workup for anemia (report below). She states that the leg ulcers seem to be slowly healing, she had US venous done for further workup of her LLE ulcers. (Report below). Planning on going to [place] to visit her daughter     Pt denies fever, chills, CP, changes in bowel/bladder habits, new digital ulcers on fingertips, nausea/vomiting, hair loss, joint pain/swelling.      Clinic visit 2019  Pt reports that pain meds were adjusted with Lyrica was increased to 150mg TID which is helping with her symptoms although only taking Lyrica 150mg daily. He recommended Vitamin B supplements which she started taking and noticed that the ulcers are starting to heal faster. ( 3 out of 7 ulcers have healed).      Pt c/o several months history of RUQ abd pain worse with bending, better with rest, no association with food, no nausea/vomiting or changes in bowel habits.     Seen by GI 2019 with plans for EGD/C-scope 19 for further workup of ROXANNE. Pt reports that she was doing well with breathing until this AM where she felt more SOB and NEW. Recently came back from [place] for a  and thinks it might " "be related to the weather changes.       Pt was referred to vascular surgery on last visit for EVLT of ulcers however cancelled appointment. She had seen Vascular surgery in 03/2018 who will consider EVLT once ulcers have healed.  Pt is currently on nifedipine 90mg, pravastatin 20mg and adcirca 40mg        Recently retired in Dec 2018, worked as a .         Clinic visit 04/2018  pt was weaned off gabapentin and placed on tramadol by pain management which has been discontinued due to intolerance and is now on Lyrica. Seen by Dr Lim with repeat 2D ECHO with normal EF ,sPAP 28. "From a PH standpoint, based on her echo today and last RHC she appears to be doing very well- PFTs also surprisingly good, chest imaging stable"  Pt's main compliant is the ulcers on her feet. Pt reports compliance with adcirca 40 mg and nifedipine 90mg daily.     Previous hx  Pt was seen in clinic on 2/22/18 and found to have a significant ROXANNE, hb 5 which required hospital admission and 2 units of pRBCs. Her anemia has resolved, she is currently on iron 325 tid. Pt will undergo gi w/u with EGD given anemia and dysphagia. 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   In the past pt's digital ulcerations required admission and treatment with epoprostenol (6/2015 and Sept/2016). She does not feel epo helped with the wounds. She is on adcirca 40 mg  and nifedipine 90mg daily. She did not qualify for Bosentan in the past.     Review of Systems   Constitutional: Negative for fever and unexpected weight change.   HENT: Negative for trouble swallowing.    Eyes: Negative for redness.   Respiratory: Negative for cough and shortness of breath.    Cardiovascular: Negative for chest pain.   Gastrointestinal: Negative for constipation and diarrhea.   Genitourinary: Negative for dysuria and genital sores.   Skin: Negative for rash.   Neurological: Negative for headaches.   Hematological: Does not bruise/bleed easily.         Objective:   /64  " " Pulse 77   Ht 5' 6" (1.676 m)   Wt 71.7 kg (158 lb)   BMI 25.50 kg/m²      Physical Exam   Vitals reviewed.  Constitutional: She is oriented to person, place, and time and well-developed, well-nourished, and in no distress. No distress.   HENT:   Head: Normocephalic and atraumatic.   Right Ear: External ear normal.   Left Ear: External ear normal.   Nose: Nose normal.   Mouth/Throat: Oropharynx is clear and moist. No oropharyngeal exudate.   Eyes: Conjunctivae and EOM are normal. Pupils are equal, round, and reactive to light. Right eye exhibits no discharge. Left eye exhibits no discharge. No scleral icterus.   Neck: Neck supple. No thyromegaly present.   Cardiovascular: Normal rate, regular rhythm, normal heart sounds and intact distal pulses.    No murmur heard.  Pulmonary/Chest: Effort normal. No respiratory distress. She exhibits no tenderness.   B/l crackles   Abdominal: Soft. Bowel sounds are normal. She exhibits no distension. There is no tenderness. There is no guarding.   Neurological: She is alert and oriented to person, place, and time.   Skin: Skin is warm and dry. She is not diaphoretic.     Psychiatric: Mood and affect normal.   Musculoskeletal: She exhibits deformity. She exhibits no edema or tenderness.   Please see pics from prior visit.  Skin tightness  Claw hand deformity, flexion contractures b/l  LE bandaged and covered today, improving per pt report  Skin score: 32                  SPEP 1/2017  Electronically reviewed and signed by:   Camila Soliman M.D.   Signed on 01/16/17 at 15:54   Increased total protein. Increased gamma globulin, polyclonal.      US Venous 06/2019  Impression:   Right Leg:  Color flow evaluation of the lower extremity demonstrates fully compressible and patent veins; however, due to heavy pitting edema, one of the paired peroneal veins cannot be appreciated which could suggest chronic occlusive thrombus versus   poor imaging. There is no evidence of venous " thrombosis in the remaining deep or superficial veins.  Significant reflux is noted in the GSV including the saphenofemoral junction (SFJ) and the SSV including the saphenopopliteal junction (SPJ). There is no   evidence of reflux in the Accessory vein. Many branches are noted throughout the entire course of the GSV and SSV.  Incidental finding of significant reflux is noted in the FV and POP V.    Left Leg:  Color flow evaluation of the lower extremity demonstrates chronic occlusive thrombus in a small segment of the GSV BK. There is no evidence of venous thrombosis in the remaining deep or superficial veins.  Significant reflux is noted in the   GSV including the saphenofemoral junction (SFJ) and the SSV including the saphenopopliteal junction (SPJ). There is no evidence of reflux in the Accessory vein. Noted is a diameter decrease in the GSV from 4.8 mm to 2.6 mm at the level of the distal   thigh and 1.9 mm at the knee.  Multiple branches of the GSV are also noted throughout its entire course. Incidental finding of significant reflux is noted in the FV and POP V.     EGD 01/2017  Normal esophagus. Two biopsies were obtained in                         the upper third of the esophagus.                        - Z-line irregular. Biopsied.                        - Esophageal polyp(s) were found. Resected and                         retrieved.                        - Normal stomach.                        - Congested duodenal mucosa. Biopsied.  Recommendation:  - Await pathology results.                        - Discharge patient to home (ambulatory).                        - Resume previous diet.                        - Continue present medications.                        - Repeat the upper endoscopy in 6 months to assure                         complete removal of polyp at GE junction.        EGD 03/2019  Impression:    - Dilation in the entire esophagus.                        - Z-line irregular, 38 cm from the  incisors.                         Biopsied.                        - 3 cm hiatal hernia.                        - Benign-appearing esophageal stenosis.                        - Normal stomach.                        - A few gastric polyps. Biopsied.                        - Normal examined duodenum. Biopsied.  Recommendation:  - Await pathology results.                        - Discharge patient to home (with escort).                        - Resume previous diet.     C-scope 03/2019  Impression:     - Five 2 to 5 mm polyps in the transverse colon,                         removed with a jumbo cold forceps. Resected and                         retrieved.                        - The entire examined colon is normal.                        - The distal rectum and anal verge are normal on                         retroflexion view.                        - The examined portion of the ileum was normal.  Recommendation:    - Discharge patient to home (with escort).     CT chest/abd 03/2019              Impression         Stable appearing chronic interstitial lung disease consistent with patient's known history of scleroderma; it is worth noting that there is new tree-in-bud opacity noted in the right upper lobe which while nonspecific can be seen in the setting of inflammatory infectious processes such as aspiration.    Persistently fluid-filled esophagus which can place the patient at risk for aspiration            DEXA 09/2018  Osteopenia. There is a 11.9% risk of a major osteoporotic fracture and a 2.6% risk of hip fracture in the next 10 years (FRAX).  Compared with previous DXA, BMD at the lumbar spine has remained stable, and the BMD at the total hip has decreased by -7.4%     2D ECHO 03/2019  · Normal left ventricular systolic function. The estimated ejection fraction is 60%  · Grade II (moderate) left ventricular diastolic dysfunction consistent with pseudonormalization.  · Elevated left atrial pressure.  · No wall  motion abnormalities.  · Normal right ventricular systolic function.  · Mild left atrial enlargement.  · Mild mitral regurgitation.  · The estimated PA systolic pressure is 41 mm Hg  · Normal central venous pressure (3 mm Hg).     CT chest 09/2015  Findings:  Examination of the vascular and soft tissue structures at the base of the neck is unremarkable.  The thoracic aorta maintains normal caliber, contour, and course without significant atherosclerotic calcification within its course.  The heart is not enlarged and there is no evidence of pericardial effusion. The esophagus is mildly dilated and   retained pneumatized secretions are identified within the dependent portion of the esophagus placing the patient at risk for aspiration and also suggestive esophageal dysmotility associated with scleroderma or reflux. There is no axillary, mediastinal,   or hilar lymphadenopathy.    A benign appearing but enlarged left axillary lymph node is identified, which appears stable to the prior examination dated 11/27/07.  Scattered normal sized and benign appearing mediastinal lymphadenopathy is also appreciated, also grossly similar to   the prior examination.     The lungs again are symmetrically expanded and demonstrate chronic generalized lung disease with small opacities and reticulations in a peripheral predominance.  This again is most notable in the lung bases were there is dilatation of small airways and faint groundglass opacity.  Findings appear grossly stable to the prior chest CT in 2007 and are consistent with interstitial lung disease as can be seen with scleroderma, pulmonary fibrosis or interstitial pneumonitis.  No focal mass lesion is identified.Limited views of the abdomen demonstrate nothing unusual on this noncontrast examination.  The osseous structures demonstrate mild degenerative changes and a stable appearing compression deformity of the T9 vertebral body.        PFTs                FVC     SVC      RV  "    DLco  3/12/19   60         64         61        82  3/6/18      64         70         64      112  4/8/16      70         70         89       84  9/4/15      66         62         72       71  1/19/15    66           2/14/14    64                                 87      Assessment:       1. Scleroderma    2. Vitamin D deficiency    3. ILD (interstitial lung disease)    4. Skin ulcers of both feet    5. Venous insufficiency of both lower extremities    6. Osteopenia of multiple sites    7. Pulmonary hypertension associated with systemic disorder    8. Gastroesophageal reflux disease, esophagitis presence not specified            Plan:       Follow up for Systemic scleroderma. Last seen by Dr. Ahuja and Dr. Thornton on 6/25/2019.    67YF with Systemic scleroderma, diagnosed 1983- She has been seeing Dr. Ahuja for over 10 years.( +SSA, +SCL-70, -RNAP3, ILD). She has stable ILD on imaging 9/2015 and had mild exercise induced pulmonary hypertension. Since last visit, pt has started mycophenolate 500 mg BID, increased to 1000 mg BID (liquid formulation=1 tsp BID or 5ml BID). Pt reports increase in energy. She states she has been able to get 8 hrs of sleep now. She also reports looser skin. She states she feels slightly "shaking or anxious" within 1st hr of taking the medication but it goes away quickly. She takes the medicine with food. AM stiffness ~15 min. Follows with Vascular Surgery for venous insufficienc, pulmonary for ILD recommending q6 month PFTs and 6 MWT, Dr. Lim with [facility] for PH on Adcirca 40 daily, and GI for reflux on omeprazole and Zantac. On Vit D and Ca supplements for osteopenia from DXA 9/2018.     - ergocalciferol (ERGOCALCIFEROL) 50,000 unit Cap; Take 1 capsule (50,000 Units total) by mouth every 7 days.  Dispense: 12 capsule; Refill: 1  - Ambulatory Referral to Vascular Surgery    Plan:  - Increase Mycophenolate to 1500 mg BID (7.5 mls BID or 1.5 tsp BID). If any increased "shakiness or " "anxiety" with increased dose, pt to notify us.   - refilled Vit D   - amb referral to vasc surgery to see Dr. Claudio. Recent imaging with VAS U/S Venous Lower Extremities suggesting significant reflux. EVLT may help improve ulcers.  - CBC in 2 wks. CBC, CMP, ESR, CRP in 4 wks. RTC in 6 wks    Discussed with Dr. Ahuja.    Gretchen Madison,   Rheumatology, PGY4    "

## 2019-08-20 NOTE — PROGRESS NOTES
Last 5 Patient Entered Readings                                      Current 30 Day Average: 129/66     Recent Readings 8/16/2019 8/14/2019 8/14/2019 8/13/2019 8/12/2019    SBP (mmHg) 119 135 165 127 143    DBP (mmHg) 63 63 76 66 66    Pulse 84 65 58 72 62            Digital Medicine: Health  Follow Up    Left voicemail to follow up with Ms. Yamel Shah.  Current BP average 129/66 mmHg is at goal, 130/80. Sent [employer] message

## 2019-08-28 ENCOUNTER — TELEPHONE (OUTPATIENT)
Dept: PHARMACY | Facility: CLINIC | Age: 69
End: 2019-08-28

## 2019-08-28 NOTE — TELEPHONE ENCOUNTER
Cellcept Refill. Pt confirmed PickUp of [facility]cept on 8/29. $15 copay in 004. Pt has ~4 doses on hand. Pt is aware of Cellcept dose change to 7.5ml BID and has been taking 7.5ml BID since her 8/20 appt. Pt reported no missed doses. Pt did not start any new medications. Pt had no further questions or concerns.

## 2019-09-05 ENCOUNTER — NURSE TRIAGE (OUTPATIENT)
Dept: ADMINISTRATIVE | Facility: CLINIC | Age: 69
End: 2019-09-05

## 2019-09-05 ENCOUNTER — TELEPHONE (OUTPATIENT)
Dept: PAIN MEDICINE | Facility: CLINIC | Age: 69
End: 2019-09-05

## 2019-09-05 DIAGNOSIS — G89.4 CHRONIC PAIN DISORDER: Primary | ICD-10-CM

## 2019-09-05 DIAGNOSIS — M34.89 CUTANEOUS SCLERODERMA: ICD-10-CM

## 2019-09-05 DIAGNOSIS — G60.9 IDIOPATHIC NEUROPATHY: ICD-10-CM

## 2019-09-05 RX ORDER — GABAPENTIN 300 MG/1
300 CAPSULE ORAL NIGHTLY
Qty: 30 CAPSULE | Refills: 4 | Status: SHIPPED | OUTPATIENT
Start: 2019-09-05 | End: 2019-09-17

## 2019-09-05 NOTE — TELEPHONE ENCOUNTER
Patient states that she reported that yesterday she woke up with a headache, dizziness, chills, cough- chest discomfort with cough, fever, and chest pressure. Patient states that the chest pressure is constant lasting longer than 5 minutes and SOB. Patient advised to call 911 and go the ER. Patient refused disposition and was not happy that she had to talk to the nurse triage line before attempting to make an appointment. Patient refused disposition.     Reason for Disposition   Chest pain lasting longer than 5 minutes and ANY of the following:* Over 50 years old* Over 30 years old and at least one cardiac risk factor (i.e., high blood pressure, diabetes, high cholesterol, obesity, smoker or strong family history of heart disease)* Pain is crushing, pressure-like, or heavy * Took nitroglycerin and chest pain was not relieved* History of heart disease (i.e., angina, heart attack, bypass surgery, angioplasty, CHF)    Additional Information   Negative: SEVERE difficulty breathing (e.g., struggling for each breath, speaks in single words)   Negative: Passed out (i.e., fainted, collapsed and was not responding)    Protocols used: ST CHEST PAIN-A-[place]

## 2019-09-05 NOTE — TELEPHONE ENCOUNTER
Phoned patient and discussed her message. She reports that her Lyrica co-pay is $400 which she cannot afford at this time. She would like to switch to Gabapentin. We will start with one pill at bedtime. We may escalate this further in the future. RX sent to pharmacy.

## 2019-09-05 NOTE — TELEPHONE ENCOUNTER
"----- Message from Kaylah Hidalgo sent at 9/5/2019  9:10 AM CDT -----  Contact: DANA GARCÍA [[id number]]  Name of Who is Calling: DANA GARCÍA [[id number]]      What is the request in detail:   Patient called requesting a cheaper medication. Patient states,  "pregabalin (LYRICA) 150 MG capsule is too expensive."  Patient is requesting something cheaper.  Please give a call back and further advise.     THANKS!      Can the clinic reply by [facility]: no      Number to Call Back :  DANA GARCÍA / ph# [phone] (M)                                      "

## 2019-09-06 ENCOUNTER — HOSPITAL ENCOUNTER (EMERGENCY)
Facility: HOSPITAL | Age: 69
Discharge: HOME | End: 2019-09-06
Attending: EMERGENCY MEDICINE
Payer: MEDICARE

## 2019-09-06 VITALS
BODY MASS INDEX: 25.39 KG/M2 | WEIGHT: 158 LBS | HEIGHT: 66 IN | DIASTOLIC BLOOD PRESSURE: 68 MMHG | HEART RATE: 78 BPM | OXYGEN SATURATION: 100 % | TEMPERATURE: 98 F | RESPIRATION RATE: 16 BRPM | SYSTOLIC BLOOD PRESSURE: 134 MMHG

## 2019-09-06 DIAGNOSIS — J18.9 PNEUMONIA DUE TO INFECTIOUS ORGANISM, UNSPECIFIED LATERALITY, UNSPECIFIED PART OF LUNG: Primary | ICD-10-CM

## 2019-09-06 DIAGNOSIS — R06.02 SHORTNESS OF BREATH: ICD-10-CM

## 2019-09-06 DIAGNOSIS — J98.8 WHEEZING-ASSOCIATED RESPIRATORY INFECTION (WARI): ICD-10-CM

## 2019-09-06 DIAGNOSIS — R05.9 COUGH: ICD-10-CM

## 2019-09-06 LAB
ALBUMIN SERPL BCP-MCNC: 4 G/DL (ref 3.5–5.2)
ALP SERPL-CCNC: 104 U/L (ref 55–135)
ALT SERPL W/O P-5'-P-CCNC: 13 U/L (ref 10–44)
ANION GAP SERPL CALC-SCNC: 9 MMOL/L (ref 8–16)
AST SERPL-CCNC: 19 U/L (ref 10–40)
BASOPHILS # BLD AUTO: 0.03 K/UL (ref 0–0.2)
BASOPHILS NFR BLD: 0.4 % (ref 0–1.9)
BILIRUB SERPL-MCNC: 0.5 MG/DL (ref 0.1–1)
BNP SERPL-MCNC: 42 PG/ML (ref 0–99)
BUN SERPL-MCNC: 11 MG/DL (ref 8–23)
CALCIUM SERPL-MCNC: 9.4 MG/DL (ref 8.7–10.5)
CHLORIDE SERPL-SCNC: 107 MMOL/L (ref 95–110)
CO2 SERPL-SCNC: 24 MMOL/L (ref 23–29)
CREAT SERPL-MCNC: 0.8 MG/DL (ref 0.5–1.4)
DIFFERENTIAL METHOD: ABNORMAL
EOSINOPHIL # BLD AUTO: 0 K/UL (ref 0–0.5)
EOSINOPHIL NFR BLD: 0.1 % (ref 0–8)
ERYTHROCYTE [DISTWIDTH] IN BLOOD BY AUTOMATED COUNT: 14.6 % (ref 11.5–14.5)
EST. GFR  (AFRICAN AMERICAN): >60 ML/MIN/1.73 M^2
EST. GFR  (NON AFRICAN AMERICAN): >60 ML/MIN/1.73 M^2
GLUCOSE SERPL-MCNC: 110 MG/DL (ref 70–110)
HCT VFR BLD AUTO: 39.6 % (ref 37–48.5)
HGB BLD-MCNC: 13.5 G/DL (ref 12–16)
IMM GRANULOCYTES # BLD AUTO: 0.02 K/UL (ref 0–0.04)
IMM GRANULOCYTES NFR BLD AUTO: 0.3 % (ref 0–0.5)
LYMPHOCYTES # BLD AUTO: 1.3 K/UL (ref 1–4.8)
LYMPHOCYTES NFR BLD: 19.1 % (ref 18–48)
MCH RBC QN AUTO: 31.5 PG (ref 27–31)
MCHC RBC AUTO-ENTMCNC: 34.1 G/DL (ref 32–36)
MCV RBC AUTO: 92 FL (ref 82–98)
MONOCYTES # BLD AUTO: 0.5 K/UL (ref 0.3–1)
MONOCYTES NFR BLD: 7.7 % (ref 4–15)
NEUTROPHILS # BLD AUTO: 4.9 K/UL (ref 1.8–7.7)
NEUTROPHILS NFR BLD: 72.4 % (ref 38–73)
NRBC BLD-RTO: 0 /100 WBC
PLATELET # BLD AUTO: 185 K/UL (ref 150–350)
PMV BLD AUTO: 11.1 FL (ref 9.2–12.9)
POTASSIUM SERPL-SCNC: 4.2 MMOL/L (ref 3.5–5.1)
PROT SERPL-MCNC: 9.4 G/DL (ref 6–8.4)
RBC # BLD AUTO: 4.29 M/UL (ref 4–5.4)
SODIUM SERPL-SCNC: 140 MMOL/L (ref 136–145)
TROPONIN I SERPL DL<=0.01 NG/ML-MCNC: 0.01 NG/ML (ref 0–0.03)
WBC # BLD AUTO: 6.71 K/UL (ref 3.9–12.7)

## 2019-09-06 PROCEDURE — 93010 ELECTROCARDIOGRAM REPORT: CPT | Mod: ,,, | Performed by: INTERNAL MEDICINE

## 2019-09-06 PROCEDURE — 93005 ELECTROCARDIOGRAM TRACING: CPT

## 2019-09-06 PROCEDURE — 99285 EMERGENCY DEPT VISIT HI MDM: CPT | Mod: 25

## 2019-09-06 PROCEDURE — 84484 ASSAY OF TROPONIN QUANT: CPT

## 2019-09-06 PROCEDURE — 85025 COMPLETE CBC W/AUTO DIFF WBC: CPT

## 2019-09-06 PROCEDURE — 94640 AIRWAY INHALATION TREATMENT: CPT

## 2019-09-06 PROCEDURE — 36000 PLACE NEEDLE IN VEIN: CPT

## 2019-09-06 PROCEDURE — 25000242 PHARM REV CODE 250 ALT 637 W/ HCPCS: Performed by: EMERGENCY MEDICINE

## 2019-09-06 PROCEDURE — 99285 EMERGENCY DEPT VISIT HI MDM: CPT | Mod: ,,, | Performed by: EMERGENCY MEDICINE

## 2019-09-06 PROCEDURE — 93010 EKG 12-LEAD: ICD-10-PCS | Mod: ,,, | Performed by: INTERNAL MEDICINE

## 2019-09-06 PROCEDURE — 83880 ASSAY OF NATRIURETIC PEPTIDE: CPT

## 2019-09-06 PROCEDURE — 99285 PR EMERGENCY DEPT VISIT,LEVEL V: ICD-10-PCS | Mod: ,,, | Performed by: EMERGENCY MEDICINE

## 2019-09-06 PROCEDURE — 80053 COMPREHEN METABOLIC PANEL: CPT

## 2019-09-06 RX ORDER — ALBUTEROL SULFATE 90 UG/1
2 AEROSOL, METERED RESPIRATORY (INHALATION) EVERY 4 HOURS PRN
Qty: 18 G | Refills: 1 | Status: SHIPPED | OUTPATIENT
Start: 2019-09-06 | End: 2019-09-06 | Stop reason: SDUPTHER

## 2019-09-06 RX ORDER — AMOXICILLIN AND CLAVULANATE POTASSIUM 400; 57 MG/5ML; MG/5ML
25 POWDER, FOR SUSPENSION ORAL EVERY 12 HOURS
Qty: 154 ML | Refills: 0 | Status: SHIPPED | OUTPATIENT
Start: 2019-09-06 | End: 2019-09-06 | Stop reason: SDUPTHER

## 2019-09-06 RX ORDER — AMOXICILLIN AND CLAVULANATE POTASSIUM 875; 125 MG/1; MG/1
1 TABLET, FILM COATED ORAL 2 TIMES DAILY
Qty: 14 TABLET | Refills: 0 | Status: SHIPPED | OUTPATIENT
Start: 2019-09-06 | End: 2019-09-06 | Stop reason: CLARIF

## 2019-09-06 RX ORDER — AMOXICILLIN AND CLAVULANATE POTASSIUM 400; 57 MG/5ML; MG/5ML
25 POWDER, FOR SUSPENSION ORAL EVERY 12 HOURS
Qty: 154 ML | Refills: 0 | Status: SHIPPED | OUTPATIENT
Start: 2019-09-06 | End: 2019-09-13

## 2019-09-06 RX ORDER — IPRATROPIUM BROMIDE AND ALBUTEROL SULFATE 2.5; .5 MG/3ML; MG/3ML
3 SOLUTION RESPIRATORY (INHALATION)
Status: COMPLETED | OUTPATIENT
Start: 2019-09-06 | End: 2019-09-06

## 2019-09-06 RX ORDER — ALBUTEROL SULFATE 90 UG/1
2 AEROSOL, METERED RESPIRATORY (INHALATION) EVERY 4 HOURS PRN
Qty: 18 G | Refills: 1 | Status: SHIPPED | OUTPATIENT
Start: 2019-09-06 | End: 2020-12-11 | Stop reason: SDUPTHER

## 2019-09-06 RX ADMIN — IPRATROPIUM BROMIDE AND ALBUTEROL SULFATE 3 ML: .5; 3 SOLUTION RESPIRATORY (INHALATION) at 02:09

## 2019-09-06 NOTE — DISCHARGE INSTRUCTIONS
X-Ray Chest PA And Lateral (Final result)   Result time 09/06/19 14:51:29   Final result by Tejas Morejon III, MD (09/06/19 14:51:29)                Impression:      Mild edema versus infiltrate.      Electronically signed by: Tejas Morejon MD  Date: 09/06/2019  Time: 14:51            Narrative:    EXAMINATION:  XR CHEST PA AND LATERAL    CLINICAL HISTORY:  Cough    FINDINGS:  Heart size is normal.  There is mild edema.  Bones bowel gas noncontributory.

## 2019-09-06 NOTE — ED TRIAGE NOTES
Pt arrived POV from home with c/o SOB, generalized weakness, slight CP. Started on Wednesday and got better yesterday but then still SOB today with slight chest pain. Denies fever, chills, nausea or emesis.

## 2019-09-06 NOTE — ED PROVIDER NOTES
Encounter Date: 9/6/2019    SCRIBE #1 NOTE: I, Jose Bowen, am scribing for, and in the presence of,  Dr. Puri. I have scribed the entire note.       History     Chief Complaint   Patient presents with    Shortness of Breath     with CP since wednesday. Denies Cp current, reports SOB current. hx of scleraderma     Time patient was seen by the provider: 12:57 PM      The patient is a 68 y.o. female with known scleroderma and other co-morbidities including: HTN, and idiopathic neuropathy, who presents to the ED with a complaint of 3-4 days of worsening general malaise, pleuritic non-radiating chest pain, fatigue, SOB, and cough. Patient reports she has chest pain and shortness of breath since last Wednesday, about 9 days ago, after taking a trip to [facility]. She states while reaching a high elevation on the plane she began to have the SOB and got an albuterol when she landed. She then notes that at Wednesday morning 2 days ago she awoke with Acid reflux, took Pepto bismol, then went back to bed. When she awoke again she had chills, fever, chest pain, generalized weakness, cough, and SOB. She describes her chest pain as worse on breathing, sharp, and radiating to her back at times. She reports no exacerbation of pain on rotation. No hx of blood clots, PE, and denies any cold symptoms. Patient does note that her legs are slightly swollen at this time. She denies any other associated symptoms at this time.    The history is provided by the patient.     Review of patient's allergies indicates:   Allergen Reactions    Sulfa (sulfonamide antibiotics)      Other reaction(s): Rash     Past Medical History:   Diagnosis Date    Abnormal Pap smear     Acid reflux     Allergy     Arthritis     GERD (gastroesophageal reflux disease)     History of migraine headaches     Hypertension     Idiopathic neuropathy 7/20/2012    ILD (interstitial lung disease) 11/6/2013    Iron deficiency anemia 3/18/2014    MRSA  carrier     Osteopenia     Pulmonary fibrosis     Pulmonary hypertension     Raynaud's disease     Scleroderma, diffuse     Sjogren's syndrome     Vitamin D deficiency 11/14/2013     Past Surgical History:   Procedure Laterality Date    BREAST BIOPSY      Left, benign    CERVICAL CONIZATION   W/ LASER  1970    COLONOSCOPY      COLONOSCOPY N/A 3/29/2019    Performed by [facility]endoza MD at [facility] (2ND FLR)    COLONOSCOPY N/A 4/4/2014    Performed by [facility]ennaro Gusman MD at [facility] END[facility] (4TH FLR)    DILATION AND CURETTAGE OF UTERUS      EGD (ESOPHAGOGASTRODUODENOSCOPY) N/A 3/29/2019    Performed by An[facility] MD at [facility] END[facility] (2ND FLR)    EGD (ESOPHAGOGASTRODUODENOSCOPY) N/A 4/4/2014    Performed by [facility]enn[facility] Gusman MD at [facility] END[facility] (4TH FLR)    ESOPHAGOGASTRODUODENOSCOPY      ESOPHAGOGASTRODUODENOSCOPY (EGD) N/A 1/26/2017    Performed by [facility] MD at [facility] END[facility] (4TH FLR)    HYSTERECTOMY  1990    FRANDY (AUB, Fibroids), ovaries remain     Family History   Problem Relation Age of Onset    Breast cancer Mother     No Known Problems Daughter     No Known Problems Son     No Known Problems Daughter     No Known Problems Son     Breast cancer Sister     Breast cancer Maternal Aunt     Osteoarthritis Brother     Melanoma Neg Hx     Colon cancer Neg Hx     Crohn's disease Neg Hx     Stomach cancer Neg Hx     Ulcerative colitis Neg Hx     Rectal cancer Neg Hx     Irritable bowel syndrome Neg Hx     Esophageal cancer Neg Hx     Celiac disease Neg Hx      Social History     Tobacco Use    Smoking status: Never Smoker    Smokeless tobacco: Never Used   Substance Use Topics    Alcohol use: No     Alcohol/week: 0.0 oz     Frequency: Never    Drug use: No     Review of Systems   Constitutional: Positive for chills, fatigue and fever.   HENT: Negative for congestion, ear pain, hearing loss, rhinorrhea, sinus pressure, sneezing, sore throat and voice change.    Eyes: Negative for  redness.   Respiratory: Positive for cough and shortness of breath.    Cardiovascular: Positive for chest pain and leg swelling.   Gastrointestinal: Negative for abdominal pain, blood in stool, diarrhea, nausea and vomiting.   Genitourinary: Negative for difficulty urinating, dysuria, hematuria, vaginal bleeding and vaginal discharge.   Musculoskeletal: Negative for back pain and joint swelling.   Skin: Negative for rash.   Neurological: Negative for seizures and weakness.   Psychiatric/Behavioral: Negative for hallucinations.   All other systems reviewed and are negative.      Physical Exam     Initial Vitals [09/06/19 1149]   BP Pulse Resp Temp SpO2   139/61 80 20 98.1 °F (36.7 °C) 98 %      MAP       --         Physical Exam    Nursing note and vitals reviewed.  Constitutional: She appears well-developed and well-nourished. She is not diaphoretic. She is active and cooperative.  Non-toxic appearance. She does not have a sickly appearance. She appears ill (Chronically ill). No distress.   HENT:   Head: Normocephalic and atraumatic.   Eyes: EOM are normal. Pupils are equal, round, and reactive to light.   Neck: Normal range of motion and phonation normal. No stridor present.   Cardiovascular: Normal rate, regular rhythm and normal heart sounds.   Pulmonary/Chest: She has wheezes (Scattered expiratory wheezing). Rales: Scattered.   Musculoskeletal: Normal range of motion. She exhibits edema. She exhibits no tenderness.   Skin:   1+ pitting edema to the tibia. Hyperpigmented skin.          ED Course   Procedures  Labs Reviewed   CBC W/ AUTO DIFFERENTIAL - Abnormal; Notable for the following components:       Result Value    Mean Corpuscular Hemoglobin 31.5 (*)     RDW 14.6 (*)     All other components within normal limits   COMPREHENSIVE METABOLIC PANEL - Abnormal; Notable for the following components:    Total Protein 9.4 (*)     All other components within normal limits   TROPONIN I   B-TYPE NATRIURETIC PEPTIDE         ECG Results          EKG 12-lead (Final result)  Result time 09/06/19 15:18:14    Final result by Interface, Lab In [facility] (09/06/19 15:18:14)                 Narrative:    Test Reason : R06.02,    Vent. Rate : 077 BPM     Atrial Rate : 077 BPM     P-R Int : 152 ms          QRS Dur : 082 ms      QT Int : 382 ms       P-R-T Axes : 049 -42 079 degrees     QTc Int : 432 ms    Normal sinus rhythm  Left atrial enlargement  Left anterior fascicular (deniz) block  Cannot rule out Anterior infarct (cited on or before 06-SEP-2019)  Abnormal ECG  When compared with ECG of 14-FEB-2014 14:10,    The axis Shifted left  Confirmed by SOL NAVAS MD ([id number]) on 9/6/2019 3:18:00 PM    Referred By:             Confirmed By:SOL NAVAS MD                            Imaging Results          X-Ray Chest PA And Lateral (Final result)  Result time 09/06/19 14:51:29    Final result by Tejas Morejon III, MD (09/06/19 14:51:29)                 Impression:      Mild edema versus infiltrate.      Electronically signed by: Tejas Morejon MD  Date:    09/06/2019  Time:    14:51             Narrative:    EXAMINATION:  XR CHEST PA AND LATERAL    CLINICAL HISTORY:  Cough    FINDINGS:  Heart size is normal.  There is mild edema.  Bones bowel gas noncontributory.                                 Medical Decision Making:   History:   Old Medical Records: I decided to obtain old medical records.  Initial Assessment:   Afebrile, atraumatic and hemodynamically stable F p/w Si/Sx URT with radiological evidence of PNA. No confusion, hypoxia, tachypnea or any resp distress warranting inpatient Tx. Rx provided and discussed Sx warranting ED return, which she and  understood.  ____________________  Kenneth Puri MD, FAAEM  Emergency Medicine Staff    Independently Interpreted Test(s):   I have ordered and independently interpreted EKG Reading(s) - see prior notes  Clinical Tests:   Lab Tests: Ordered and Reviewed  Radiological Study:  Ordered and Reviewed  Medical Tests: Ordered and Reviewed            Scribe Attestation:   Scribe #1: I performed the above scribed service and the documentation accurately describes the services I performed. I attest to the accuracy of the note.  Comments: STAFF ATTENDING PHYSICIAN NOTE:  I provided and agree with the documentation provided by TROY on Yamel Shah.  ____________________  Kenneth Puri MD, [facility]  Emergency Medicine Staff               Clinical Impression:       ICD-10-CM ICD-9-CM   1. Pneumonia due to infectious organism, unspecified laterality, unspecified part of lung J18.9 136.9     484.8   2. Shortness of breath R06.02 786.05   3. Cough R05 786.2   4. Wheezing-associated respiratory infection (WARI) J98.8 519.8         Disposition:   Disposition: Discharged  Condition: Stable                        Matt Puri MD  09/13/19 0840

## 2019-09-09 ENCOUNTER — HOSPITAL ENCOUNTER (OUTPATIENT)
Dept: PULMONOLOGY | Facility: CLINIC | Age: 69
Discharge: HOME | End: 2019-09-09
Payer: MEDICARE

## 2019-09-09 ENCOUNTER — OFFICE VISIT (OUTPATIENT)
Dept: TRANSPLANT | Facility: CLINIC | Age: 69
End: 2019-09-09
Payer: MEDICARE

## 2019-09-09 ENCOUNTER — IMMUNIZATION (OUTPATIENT)
Dept: PHARMACY | Facility: CLINIC | Age: 69
End: 2019-09-09
Payer: MEDICARE

## 2019-09-09 VITALS
OXYGEN SATURATION: 100 % | BODY MASS INDEX: 26.08 KG/M2 | HEART RATE: 56 BPM | HEIGHT: 65 IN | DIASTOLIC BLOOD PRESSURE: 52 MMHG | SYSTOLIC BLOOD PRESSURE: 138 MMHG | WEIGHT: 156.5 LBS

## 2019-09-09 VITALS — HEIGHT: 65 IN | WEIGHT: 156.31 LBS | BODY MASS INDEX: 26.04 KG/M2

## 2019-09-09 DIAGNOSIS — I27.29 PULMONARY HYPERTENSION ASSOCIATED WITH SYSTEMIC DISORDER: Chronic | ICD-10-CM

## 2019-09-09 DIAGNOSIS — I27.29 PULMONARY HYPERTENSION ASSOCIATED WITH SYSTEMIC DISORDER: Primary | Chronic | ICD-10-CM

## 2019-09-09 DIAGNOSIS — I77.1 ARTERIAL INSUFFICIENCY WITH ISCHEMIC ULCER: ICD-10-CM

## 2019-09-09 DIAGNOSIS — L98.499 ARTERIAL INSUFFICIENCY WITH ISCHEMIC ULCER: ICD-10-CM

## 2019-09-09 DIAGNOSIS — I10 ESSENTIAL HYPERTENSION: ICD-10-CM

## 2019-09-09 DIAGNOSIS — J84.9 ILD (INTERSTITIAL LUNG DISEASE): ICD-10-CM

## 2019-09-09 DIAGNOSIS — M34.9 SCLERODERMA: ICD-10-CM

## 2019-09-09 DIAGNOSIS — D50.9 IRON DEFICIENCY ANEMIA, UNSPECIFIED IRON DEFICIENCY ANEMIA TYPE: ICD-10-CM

## 2019-09-09 LAB
DLCO ADJ PRE: 12.72 ML/(MIN*MMHG) (ref 16.58–28.05)
DLCO SINGLE BREATH LLN: 16.58
DLCO SINGLE BREATH PRE REF: 56.6 %
DLCO SINGLE BREATH REF: 22.32
DLCOC SBVA LLN: 2.97
DLCOC SBVA PRE REF: 95.8 %
DLCOC SBVA REF: 4.38
DLCOC SINGLE BREATH LLN: 16.58
DLCOC SINGLE BREATH PRE REF: 57 %
DLCOC SINGLE BREATH REF: 22.32
DLCOCSBVAULN: 5.79
DLCOCSINGLEBREATHULN: 28.05
DLCOSINGLEBREATHULN: 28.05
DLCOVA LLN: 2.97
DLCOVA PRE REF: 95.2 %
DLCOVA PRE: 4.17 ML/(MIN*MMHG*L) (ref 2.97–5.79)
DLCOVA REF: 4.38
DLCOVAULN: 5.79
DLVAADJ PRE: 4.19 ML/(MIN*MMHG*L) (ref 2.97–5.79)
FEF 25 75 LLN: 0.89
FEF 25 75 PRE REF: 92.2 %
FEF 25 75 REF: 1.89
FEV05 LLN: 0.94
FEV05 REF: 1.79
FEV1 FVC LLN: 67
FEV1 FVC PRE REF: 102 %
FEV1 FVC REF: 79
FEV1 LLN: 1.6
FEV1 PRE REF: 73 %
FEV1 REF: 2.19
FVC LLN: 2.08
FVC PRE REF: 71.1 %
FVC REF: 2.78
IVC PRE: 1.89 L (ref 2.08–3.48)
IVC SINGLE BREATH LLN: 2.08
IVC SINGLE BREATH PRE REF: 67.8 %
IVC SINGLE BREATH REF: 2.78
IVCSINGLEBREATHULN: 3.48
PRE DLCO: 12.64 ML/(MIN*MMHG) (ref 16.58–28.05)
PRE FEF 25 75: 1.74 L/S (ref 0.89–2.89)
PRE FET 100: 6.58 SEC
PRE FEV05 REF: 75.3 %
PRE FEV1 FVC: 80.68 % (ref 67.19–91.03)
PRE FEV1: 1.59 L (ref 1.6–2.77)
PRE FEV5: 1.35 L (ref 0.94–2.65)
PRE FVC: 1.98 L (ref 2.08–3.48)
PRE PEF: 5.45 L/S
VA PRE: 3.04 L (ref 4.95–4.95)
VA SINGLE BREATH LLN: 4.95
VA SINGLE BREATH PRE REF: 61.5 %
VA SINGLE BREATH REF: 4.95
VASINGLEBREATHULN: 4.95

## 2019-09-09 PROCEDURE — 94729 DIFFUSING CAPACITY: CPT | Mod: 26,S$PBB,, | Performed by: INTERNAL MEDICINE

## 2019-09-09 PROCEDURE — 94618 PULMONARY STRESS TESTING: CPT | Mod: 26,S$PBB,, | Performed by: INTERNAL MEDICINE

## 2019-09-09 PROCEDURE — 99214 PR OFFICE/OUTPT VISIT, EST, LEVL IV, 30-39 MIN: ICD-10-PCS | Mod: S$PBB,,, | Performed by: INTERNAL MEDICINE

## 2019-09-09 PROCEDURE — 99214 OFFICE O/P EST MOD 30 MIN: CPT | Mod: S$PBB,,, | Performed by: INTERNAL MEDICINE

## 2019-09-09 PROCEDURE — 99999 PR PBB SHADOW E&M-EST. PATIENT-LVL IV: CPT | Mod: PBBFAC,,, | Performed by: INTERNAL MEDICINE

## 2019-09-09 PROCEDURE — 94729 PR C02/MEMBANE DIFFUSE CAPACITY: ICD-10-PCS | Mod: 26,S$PBB,, | Performed by: INTERNAL MEDICINE

## 2019-09-09 PROCEDURE — 99214 OFFICE O/P EST MOD 30 MIN: CPT | Mod: PBBFAC,25 | Performed by: INTERNAL MEDICINE

## 2019-09-09 PROCEDURE — 94010 BREATHING CAPACITY TEST: CPT | Mod: 26,S$PBB,, | Performed by: INTERNAL MEDICINE

## 2019-09-09 PROCEDURE — 94618 PULMONARY STRESS TESTING: ICD-10-PCS | Mod: 26,S$PBB,, | Performed by: INTERNAL MEDICINE

## 2019-09-09 PROCEDURE — 94729 DIFFUSING CAPACITY: CPT | Mod: PBBFAC | Performed by: INTERNAL MEDICINE

## 2019-09-09 PROCEDURE — 94010 BREATHING CAPACITY TEST: CPT | Mod: PBBFAC | Performed by: INTERNAL MEDICINE

## 2019-09-09 PROCEDURE — 94618 PULMONARY STRESS TESTING: CPT | Mod: PBBFAC | Performed by: INTERNAL MEDICINE

## 2019-09-09 PROCEDURE — 94010 BREATHING CAPACITY TEST: ICD-10-PCS | Mod: 26,S$PBB,, | Performed by: INTERNAL MEDICINE

## 2019-09-09 PROCEDURE — 99999 PR PBB SHADOW E&M-EST. PATIENT-LVL IV: ICD-10-PCS | Mod: PBBFAC,,, | Performed by: INTERNAL MEDICINE

## 2019-09-09 RX ORDER — PREGABALIN 150 MG/1
150 CAPSULE ORAL 2 TIMES DAILY
Refills: 3 | COMMUNITY
Start: 2019-09-06 | End: 2019-09-17 | Stop reason: SDUPTHER

## 2019-09-09 NOTE — PATIENT INSTRUCTIONS
No medicine changes today    Keep salt intake to under 2000 mg sodium, fluids to under 2 L (64 oz)    Flu shot this fall    Call us if you find yourself getting more short of breath, have more swelling or unexpected weight changes

## 2019-09-09 NOTE — PROGRESS NOTES
Subjective:    Patient ID:  Yamel Shah is a 68 y.o. female who presents for follow-up of Pulmonary Hypertension (4 mos. w/ labs,6mwt)      HPI   very pleasant 68 year old woman with scleroderma, diagnosed in 1983, previously followed by Dr Boudreaux for pulmonary hypertension here for PH f/u-    She was started on Revatio 2013, but had difficulty with affording it, so she was switched to Adcirca 40mg daily.     Since last visit  Pt had an ER visit last weekend with SOB- was in [employer] the week before, had to go through [place] and think the elevation played a role- got an inhaler which she used while in [place] but then when she got home, started having F/C/HA/SOB- CXr with Mild edema versus infiltrate- prescribed abx and inhaler and got a breathing tx in the ED- feeling much better today- did her 6mw today for the first time in a long time and says it felt good to feel her heart pumping- foot ulcers are slowly healing  Had some swelling in her legs and feels like her belly is distended- carries her wt in her belly recently (x2-3 mo) and has felt more winded- didn't get winded during her walk today though she felt her heart beating faster and got alittle LH  Walks around grocery store without having to stop to catch her breath (only if her feet hurt)    No orthopnea, PND    6mw today 335 m (   m in    )                                              O2 sat 98  -> 98  %                                                           HR  63 -> 80                                                                  BP  146 /67   ->129 /58                                                         Ju   2  -> 3      TTE 3/12/19  · Normal left ventricular systolic function. The estimated ejection fraction is 60%  · Grade II (moderate) left ventricular diastolic dysfunction consistent with pseudonormalization.  · Elevated left atrial pressure.  · No wall motion abnormalities.  · Normal right ventricular systolic function.  · Mild  left atrial enlargement.  · Mild mitral regurgitation.  · The estimated PA systolic pressure is 41 mm Hg  · Normal central venous pressure (3 mm Hg).   Normal cavity size, wall thickness and ejection fraction. Wall motion normal. RV 3.3  TAPSE 2.8      TTE   Right Ventricle: The right ventricle is normal in size measuring 3.8 cm at the base in the apical right ventricle-focused view. Global right ventricular systolic function appears normal. Tricuspid annular plane systolic excursion (TAPSE) is 2.5 cm.   Tissue Doppler-derived tricuspid annular peak systolic velocity (S prime) is 12.5 cm/s. The estimated PA systolic pressure is 28 mmHg.   left atrial volume index is mildly enlarged, measuring 38.00 cc/m2.     Left Ventricle: The left ventricle is upper limit of normal, with an end-diastolic diameter of 5.0 cm, and an end-systolic diameter of 2.9 cm. LV wall thickness is normal, with the septum measuring 0.8 cm and the posterior wall measuring 0.9 cm across.   Relative wall thickness was normal at 0.36, and the LV mass index was 97.2 g/m2 consistent with normal left ventricular mass. There are no regional wall motion abnormalities. Left ventricular systolic function appears normal. Visually estimated ejection   fraction is 60-65%. The LV Doppler derived stroke volume equals 63.0 ccs.    TTE 8/17    1 - Upper limit of normal left ventricular enlargement.     2 - Normal left ventricular systolic function (EF 60-65%).     3 - No wall motion abnormalities.     4 - Normal left ventricular diastolic function.     5 - Mild left atrial enlargement.     6 - Normal right ventricular systolic function .     7 - Trivial to mild mitral regurgitation.     8 - The estimated PA systolic pressure is 28 mmHg.     TTE   1 - Normal left ventricular systolic function (EF 60-65%).     2 - Mild left atrial enlargement.     3 - Normal right ventricular systolic function .     4 - The estimated PA systolic pressure is 30 mmHg.     PFTs  "9/16  Normal airflow, mild restriction, normal DLCO    CT chest 9/15  Stable-appearing chronic interstitial lung disease consistent with patient's history of scleroderma. No acute abnormality is identified.  Given the long-term stability of findings dating back to 2007, follow-up as clinically warranted.    Dependent secretions throughout the esophagus to suggest dysmotility  RHC 6/29/ 15    AOSAT: 97  FICKCI: 3.42  FICKCO: 6.06  PAPRES: 35/12 (21)  PASAT: 73  PVR: 1.98  PWPRES: 12/12 (9)  RAPRES: 11/9 (5)  RVPRES: 31/1, 6    TTE 6/15  1 - Normal left ventricular systolic function (EF 60-65%).   2 - Normal left ventricular diastolic function.   3 - Normal right ventricular systolic function .   4 - Trivial tricuspid regurgitation.      Review of Systems   Constitution: Negative for chills, fever, malaise/fatigue and weight gain.   HENT: Negative.    Eyes: Negative.    Cardiovascular: Negative for chest pain, dyspnea on exertion, leg swelling, near-syncope, orthopnea, palpitations, paroxysmal nocturnal dyspnea and syncope.   Respiratory: Negative for cough and shortness of breath.    Endocrine: Negative.    Skin: Negative.    Musculoskeletal: Negative.         Foot ulcer pain   Gastrointestinal: Negative for bloating, abdominal pain and change in bowel habit.   Neurological: Negative for dizziness and light-headedness.   Psychiatric/Behavioral: Negative for depression.        Objective:   BP (!) 138/52 (BP Location: Right arm, Patient Position: Sitting, BP Method: Medium (Automatic))   Pulse (!) 56   Ht 5' 5" (1.651 m)   Wt 71 kg (156 lb 8.4 oz)   SpO2 100%   BMI 26.05 kg/m²       Physical Exam   Constitutional: She is oriented to person, place, and time. She appears well-developed and well-nourished.   HENT:   Head: Normocephalic and atraumatic.   Eyes: Right eye exhibits no discharge. Left eye exhibits no discharge.   Neck: Neck supple. No JVD present. No thyromegaly present.   Cardiovascular: Normal rate and " regular rhythm. Exam reveals no gallop and no friction rub.   No murmur heard.       Pulmonary/Chest: Effort normal and breath sounds normal. No respiratory distress. She has no wheezes. She has no rales.   Abdominal: Soft. Bowel sounds are normal. She exhibits no distension. There is no tenderness.   Musculoskeletal: Normal range of motion. She exhibits no edema or tenderness.   Neurological: She is alert and oriented to person, place, and time. No cranial nerve deficit. Coordination normal.   Skin: Skin is warm and dry. No rash noted.   Severe tightening of skin hands   Psychiatric: She has a normal mood and affect. Judgment and thought content normal.         Lab Results   Component Value Date    BNP 36 09/09/2019     09/06/2019    K 4.2 09/06/2019    MG 2.1 10/09/2016     09/06/2019    CO2 24 09/06/2019    BUN 11 09/06/2019    CREATININE 0.8 09/06/2019     09/06/2019    AST 19 09/06/2019    ALT 13 09/06/2019    ALBUMIN 4.0 09/06/2019    PROT 9.4 (H) 09/06/2019    BILITOT 0.5 09/06/2019    CHOL 125 05/10/2019    HDL 43 05/10/2019    LDLCALC 64.6 05/10/2019    TRIG 87 05/10/2019       Magnesium   Date Value Ref Range Status   10/09/2016 2.1 1.6 - 2.6 mg/dL Final       Lab Results   Component Value Date    WBC 4.25 09/09/2019    HGB 12.4 09/09/2019    HCT 38.3 09/09/2019    MCV 97 09/09/2019     09/09/2019       Lab Results   Component Value Date    INR 1.0 06/29/2015    INR 1.0 01/21/2013    INR 1.0 09/29/2007       BNP   Date Value Ref Range Status   09/09/2019 36 0 - 99 pg/mL Final     Comment:     Values of less than 100 pg/ml are consistent with non-CHF populations.   09/06/2019 42 0 - 99 pg/mL Final     Comment:     Values of less than 100 pg/ml are consistent with non-CHF populations.   03/12/2019 37 0 - 99 pg/mL Final     Comment:     Values of less than 100 pg/ml are consistent with non-CHF populations.       LD   Date Value Ref Range Status   02/22/2018 161 110 - 260 U/L Final      Comment:     Results are increased in hemolyzed samples.   09/30/2007 139 110 - 260 U/L Final           Assessment:       1. Pulmonary hypertension associated with systemic disorder- normal PAP on RHC  with normal RV function by echo, BNP normal, euvolemic on exam- 6mw limited by foot ulcers. Now with diastolic dysfxn on echo along with mild LAE   2. Scleroderma    3. Telangiectasia    4. ILD (interstitial lung disease) - PFts also surprisingly good, chest imaging stable        Plan:       no PH medicine changes today- cont adcirca, flu shot this fall (now on MMF)    From a PH standpoint, based on her echo and last RHC she appears to be doing very well- PFTs repeated per Dr Ahuja, chest imaging stable    Keep salt intake to under 2000 mg sodium, fluids to under 2 L (64 oz)    Check weights every morning after getting out of bed and urinating. If weight goes up 3# overnight or 5# in one week she should call us    RTC 6 mo with echo labs and walk if she can do it

## 2019-09-09 NOTE — PROCEDURES
Yamel Shah is a 68 y.o.  female patient, who presents for a 6 minute walk test ordered by MD Raphael.  The diagnosis is Scleroderma/CREST.  The patient's BMI is 26.1 kg/m2.  Predicted distance (lower limit of normal) is 324.53 meters.      Test Results:    The test was completed without stopping.The total time walked was 360 seconds.  During walking, the patient reported:  Dyspnea. The patient used no assistive devices  during testing.     09/09/2019---------Distance: 335.28 meters (1100 feet)     O2 Sat % Supplemental Oxygen Heart Rate Blood Pressure Ju Scale   Pre-exercise  (Resting) 98 % Room Air 63 bpm 146/67 mmHg 2   During Exercise 98 % Room Air 80 bpm 129/58 mmHg 3   Post-exercise  (Recovery) 99 % Room Air  67 bpm 128/62 mmHg       Recovery Time: 180 seconds    Performing nurse/tech: Frannie ZALDIVAR      PREVIOUS STUDY:   The patient had a previous study.    01/19/2015---------Distance: 518.16 meters (1700 feet)       O2 Sat % Supplemental Oxygen Heart Rate Blood Pressure Ju Scale   Pre-exercise  (Resting) 98 % Room Air 65 bpm 135/62 mmHg 0.5   During Exercise 100 % Room Air 49 bpm 143/60 mmHg 2   Post-exercise  (Recovery) 100 % Room Air  71 bpm          CLINICAL INTERPRETATION:  Six minute walk distance is 335.28 meters (1100 feet) with moderate dyspnea.  During exercise, there was no significant desaturation while breathing room air.  Blood pressure decreased significantly and Heart rate remained stable with walking.  This may represent an abnormal cardiovascular response to exercise.  The patient did not report non-pulmonary symptoms during exercise.  Since the previous study in January 2015, exercise capacity may be somewhat worse.  Based upon age and body mass index, exercise capacity is normal.

## 2019-09-16 ENCOUNTER — OFFICE VISIT (OUTPATIENT)
Dept: INTERNAL MEDICINE | Facility: CLINIC | Age: 69
End: 2019-09-16
Payer: MEDICARE

## 2019-09-16 VITALS
RESPIRATION RATE: 19 BRPM | TEMPERATURE: 98 F | HEIGHT: 65 IN | BODY MASS INDEX: 25.93 KG/M2 | DIASTOLIC BLOOD PRESSURE: 62 MMHG | WEIGHT: 155.63 LBS | SYSTOLIC BLOOD PRESSURE: 110 MMHG | HEART RATE: 72 BPM

## 2019-09-16 DIAGNOSIS — I27.29 PULMONARY HYPERTENSION ASSOCIATED WITH SYSTEMIC DISORDER: Chronic | ICD-10-CM

## 2019-09-16 DIAGNOSIS — G60.9 IDIOPATHIC NEUROPATHY: ICD-10-CM

## 2019-09-16 DIAGNOSIS — J18.9 PNEUMONIA DUE TO INFECTIOUS ORGANISM, UNSPECIFIED LATERALITY, UNSPECIFIED PART OF LUNG: Primary | ICD-10-CM

## 2019-09-16 DIAGNOSIS — M34.9 SCLERODERMA: ICD-10-CM

## 2019-09-16 DIAGNOSIS — I10 ESSENTIAL HYPERTENSION: ICD-10-CM

## 2019-09-16 PROCEDURE — 99214 PR OFFICE/OUTPT VISIT, EST, LEVL IV, 30-39 MIN: ICD-10-PCS | Mod: S$PBB,,, | Performed by: INTERNAL MEDICINE

## 2019-09-16 PROCEDURE — 99999 PR PBB SHADOW E&M-EST. PATIENT-LVL III: CPT | Mod: PBBFAC,,, | Performed by: INTERNAL MEDICINE

## 2019-09-16 PROCEDURE — 99214 OFFICE O/P EST MOD 30 MIN: CPT | Mod: S$PBB,,, | Performed by: INTERNAL MEDICINE

## 2019-09-16 PROCEDURE — 99213 OFFICE O/P EST LOW 20 MIN: CPT | Mod: PBBFAC,PO | Performed by: INTERNAL MEDICINE

## 2019-09-16 PROCEDURE — 99999 PR PBB SHADOW E&M-EST. PATIENT-LVL III: ICD-10-PCS | Mod: PBBFAC,,, | Performed by: INTERNAL MEDICINE

## 2019-09-17 ENCOUNTER — OFFICE VISIT (OUTPATIENT)
Dept: PAIN MEDICINE | Facility: CLINIC | Age: 69
End: 2019-09-17
Payer: MEDICARE

## 2019-09-17 VITALS
RESPIRATION RATE: 18 BRPM | SYSTOLIC BLOOD PRESSURE: 134 MMHG | BODY MASS INDEX: 26.08 KG/M2 | HEIGHT: 65 IN | HEART RATE: 73 BPM | WEIGHT: 156.5 LBS | DIASTOLIC BLOOD PRESSURE: 59 MMHG

## 2019-09-17 DIAGNOSIS — G89.4 CHRONIC PAIN DISORDER: Primary | ICD-10-CM

## 2019-09-17 DIAGNOSIS — M34.89 CUTANEOUS SCLERODERMA: ICD-10-CM

## 2019-09-17 DIAGNOSIS — G60.9 IDIOPATHIC NEUROPATHY: ICD-10-CM

## 2019-09-17 PROCEDURE — 99213 OFFICE O/P EST LOW 20 MIN: CPT | Mod: S$PBB,,, | Performed by: NURSE PRACTITIONER

## 2019-09-17 PROCEDURE — 99999 PR PBB SHADOW E&M-EST. PATIENT-LVL III: CPT | Mod: PBBFAC,,, | Performed by: NURSE PRACTITIONER

## 2019-09-17 PROCEDURE — 99213 PR OFFICE/OUTPT VISIT, EST, LEVL III, 20-29 MIN: ICD-10-PCS | Mod: S$PBB,,, | Performed by: NURSE PRACTITIONER

## 2019-09-17 PROCEDURE — 99213 OFFICE O/P EST LOW 20 MIN: CPT | Mod: PBBFAC | Performed by: NURSE PRACTITIONER

## 2019-09-17 PROCEDURE — 99999 PR PBB SHADOW E&M-EST. PATIENT-LVL III: ICD-10-PCS | Mod: PBBFAC,,, | Performed by: NURSE PRACTITIONER

## 2019-09-17 RX ORDER — PREGABALIN 150 MG/1
150 CAPSULE ORAL 2 TIMES DAILY
Qty: 60 CAPSULE | Refills: 2 | Status: SHIPPED | OUTPATIENT
Start: 2019-09-17 | End: 2019-12-18 | Stop reason: DRUGHIGH

## 2019-09-17 NOTE — PROGRESS NOTES
Chronic Pain - Follow Up    Referring Physician: No ref. provider found    Chief Complaint:   Chief Complaint   Patient presents with    Follow-up        SUBJECTIVE: Disclaimer: This note has been generated using voice-recognition software. There may be typographical errors that have been missed during proof-reading    Interval History 9/17/2019:  The patient returns to clinic today for follow up. She continues to report bilateral foot pain that is burning and tingling in nature. Her pain is worse with sitting and at night. She continues to have slow healing ulcers to both feet. She continues to perform a home wound care program. She is no longer taking Gabapentin which was previously called in. She is now taking Pregabalin generic with benefit. She denies any other health changes. Her pain today is 4/10.    Interval History 6/13/2019:  The patient returns to clinic for follow up for bilateral foot pain. She continues to report bilateral foot pain that is burning in nature. She continues to have slow healing wounds to both feet from ulcers. She continues to take Lyrica once daily but reports increased pain. She continues to take Vitamin B12. She denies any other health changes. Her pain today is 8/10.    Interval History 3/13/2019:  The patient returns to clinic today for follow up. She continues to report bilateral foot pain that is burning and tingling in nature. Her pain is worse with prolonged walking and standing. She continues to have bilateral foot wounds. She reports that these wounds have significantly improved since last visit. She is currently taking Vitamin B12 with benefit. She continues to report benefit with Lyrica. She is currently taking this once daily. She denies any other health changes. Her pain today is 5/10.    Interval History 2/6/2019:  The patient returns to clinic today for follow up. She reports that her bilateral foot wounds have significantly improved since last visit. She does  report some significant improvement in her foot pain. She reports intermittent bilateral foot pain especially with prolonged walking. She describes this pain as heavy and tingling in nature. She is no longer taking Celebrex. She is currently taking Lyrica 150 mg BID with benefit. She does report that the Lyrica is expensive for her. She denies any other health changes. Her pain today is 5/10.    Interval History 12/5/18:  Patient returns to clinic today for follow up. She reports that since increasing her medications, she is having significant improvement in pain during the day time. She is currently taking Lyrica 75mg TID and Celebrex 200mg TID. However, she states that the burning  And tingling pain in both her feet increase in the evening around 6 or 7pm. She is unable to sleep during the nights due to the pain. She is still wearing her bilateral boots due to non healing ulcers from her scleroderma.     Interval History 8/30/2018:  The patient returns to clinic today for follow up. She continues to report bilateral foot pain that is burning and tingling in nature. She reports that this pain is worse at night. She is currently taking Lyrica 75 mg BID with limited relief. She did not have any benefit with Mobic. She continues to take Aleve with some benefit. She continues to have nonhealing ulcers. She wears an ACE bandage to her right calf, as well as boots to her bilateral feet. She denies any other health changes. Her pain today is 7/10.     Interval History 7/11/2018:  Since previous encounter she has weaned from gabapentin to 600mg / day and did not notice significant worsening of pain, but was having somnelence from higher doses of the medication in the past.  We are weaning in an attempt to trial Lyrica as an alternative to gabapentin.  Tramadol causes significant sedation, limiting its use.  She has discontinued the use of the tramadol.  Additionally she does have benefit from anti-inflammatory medication,  has been using aleve, has not trialed CANTRELL-2 inhibitors in the past.    Interval history 05/16/2018:      The patient has had x-rays of the lumbar spine which did not reveal any evidence of significant neuroforaminal stenosis with degenerative disc disease.  There is mild facet arthropathy.  She has escalated her gabapentin and is currently taking 2100 mg of gabapentin per day without any noticeable improvement but daytime somnolence.  She continues to have nonhealing ulcers and topical pain cream is helping to a limited degree over her leg in areas where there is no skin disruption.    Initial encounter:    Yamel Shah presents to the clinic for the evaluation of foot pain. The pain started 2 years ago following ulcers and symptoms have been worsening.    Brief history: History of scleroderma    Pain Description:    The pain is located in the both feet in the area of slowly healing chronic foot ulcers which were partly from venous insufficiency and stasis associated with her scleroderma.  In her right lower extremity she describes radicular symptoms in the L4/5 distribution.    At BEST  5/10     At WORST  10/10 on the WORST day.      On average pain is rated as 8/10.     Today the pain is rated as 8/10    The pain is described as burning, sharp, shooting and constant      Symptoms interfere with daily activity, sleeping and work.     Exacerbating factors: Laying, Walking, Night Time, Morning and Getting out of bed/chair.      Mitigating factors medications.     Patient denies night fever/night sweats, urinary incontinence, bowel incontinence, significant weight loss, significant motor weakness and loss of sensations.  Patient denies any suicidal or homicidal ideations    Pain Medications:  Current:  Lyrica 150 mg daily    Tried in Past:  NSAIDs -Aleve, Mobic, Celebrex  TCA -Never  SNRI -Never  Anti-convulsants - Gabapentin, Lyrica  Muscle Relaxants -Never  Opioids-Tramadol    Physical Therapy/Home Exercise:  no       report:  Reviewed and consistent with medication use as prescribed.    Pain Procedures: none    Chiropractor -never  Acupuncture - never  TENS unit -never  Spinal decompression -never  Joint replacement -never    Imaging:   X-ray lumbar spine 6/1/2016:  2 views: Alignment is normal. There is mild DJD. No fracture dislocation bone destruction seen.   Impression      Mild DJD.     Xray lumbar spine 4/2018:  COMPARISON:  June 2016    FINDINGS:  There is slight curvature of the lumbar spine.  The vertebral bodies are normally aligned and normal in height.  Mild disc space narrowing present at L5-S1.  There is mild facet degenerative change in the lower spine.  No significant osteophytic spurring present.  There is no change in alignment with flexion or extension.      Past Medical History:   Diagnosis Date    Abnormal Pap smear     Acid reflux     Allergy     Arthritis     GERD (gastroesophageal reflux disease)     History of migraine headaches     Hypertension     Idiopathic neuropathy 7/20/2012    ILD (interstitial lung disease) 11/6/2013    Iron deficiency anemia 3/18/2014    MRSA carrier     Osteopenia     Pulmonary fibrosis     Pulmonary hypertension     Raynaud's disease     Scleroderma, diffuse     Sjogren's syndrome     Vitamin D deficiency 11/14/2013     Past Surgical History:   Procedure Laterality Date    BREAST BIOPSY      Left, benign    CERVICAL CONIZATION   W/ LASER  1970    COLONOSCOPY      COLONOSCOPY N/A 3/29/2019    Performed by [facility] at [facility] (2ND FLR)    COLONOSCOPY N/A 4/4/2014    [facility] at [facility] (4TH FLR)    DILATION AND CURETTAGE OF UTERUS      EGD (ESOPHAGOGASTRODUODENOSCOPY) N/A 3/29/2019    Performed by [facility] at [facility] (2ND FLR)    EGD (ESOPHAGOGASTRODUODENOSCOPY) N/A 4/4/2014    Performed by [facility] at 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     Employer: RETIRED   Social Needs    Financial resource strain: Not on file    Food insecurity:     Worry: Not on file     Inability: Not on file    Transportation needs:     Medical: Not on file     Non-medical: Not on file   Tobacco Use    Smoking status: Never Smoker    Smokeless tobacco: Never Used   Substance and Sexual Activity    Alcohol use: No     Alcohol/week: 0.0 oz     Frequency: Never    Drug use: No    Sexual activity: Yes     Partners: Male   Lifestyle    Physical activity:     Days per week: Not on file     Minutes per session: Not on file    Stress: Not on file   Relationships    Social connections:     Talks on phone: Not on file     Gets together: Not on file     Attends Confucianist service: Not on file     Active member of club or organization: Not on file     Attends meetings of clubs or organizations: Not on file     Relationship status: Not on file   Other Topics Concern    Are you pregnant or think you may be? No    Breast-feeding No   Social History Narrative         Family History   Problem Relation Age of Onset    Breast cancer Mother     No Known Problems Daughter     No Known Problems Son     No Known Problems Daughter     No Known Problems Son     Breast cancer Sister     Breast cancer Maternal Aunt     Osteoarthritis Brother     Melanoma Neg Hx     Colon cancer Neg Hx     Crohn's disease Neg Hx     Stomach cancer Neg Hx     Ulcerative colitis Neg Hx     Rectal cancer Neg Hx      Irritable bowel syndrome Neg Hx     Esophageal cancer Neg Hx     Celiac disease Neg Hx        Review of patient's allergies indicates:   Allergen Reactions    Sulfa (sulfonamide antibiotics)      Other reaction(s): Rash       Current Outpatient Medications   Medication Sig    albuterol (VENTOLIN HFA) 90 mcg/actuation inhaler Inhale 2 puffs into the lungs every 4 (four) hours as needed for Wheezing. Rescue    b complex vitamins tablet Take 1 tablet by mouth once daily.    ergocalciferol (ERGOCALCIFEROL) 50,000 unit Cap Take 1 capsule (50,000 Units total) by mouth every 7 days.    ferrous sulfate 325 (65 FE) MG EC tablet Take 1 tablet (325 mg total) by mouth 3 (three) times daily. (Patient taking differently: Take 325 mg by mouth once daily. )    flu vacc [id number],65yr up,PF (FLUZONE HIGH-DOSE 2019-20, PF,) 180 mcg/0.5 mL Syrg INJECT INTO THE MUSCLE.    gabapentin (NEURONTIN) 300 MG capsule Take 1 capsule (300 mg total) by mouth every evening.    multivitamin capsule Take 1 capsule by mouth once daily.     mycophenolate mofetil (CELLCEPT) 200 mg/mL SusR Take 7.5 mLs (1,500 mg total) by mouth 2 (two) times daily.    NIFEdipine (ADALAT CC) 90 MG TbSR TAKE 1 TABLET(90 MG) BY MOUTH EVERY DAY    omeprazole (PRILOSEC) 40 MG capsule Take 1 capsule (40 mg total) by mouth 2 (two) times daily before meals. (Patient taking differently: Take 40 mg by mouth every morning. )    pravastatin (PRAVACHOL) 20 MG tablet TAKE 1 TABLET BY MOUTH EVERY EVENING    pregabalin (LYRICA) 150 MG capsule Take 150 mg by mouth 2 (two) times daily.    ranitidine (ZANTAC) 150 MG capsule TAKE 1 CAPSULE(150 MG) BY MOUTH TWICE DAILY (Patient taking differently: TAKE 1 CAPSULE(150 MG) BY MOUTH DAILY EVENINGS)    spironolactone (ALDACTONE) 25 MG tablet Take 1 tablet (25 mg total) by mouth once daily.    tadalafil (ADCIRCA) 20 mg Tab Take 2 tablets (40 mg total) by mouth once daily.     No current facility-administered medications for  "this visit.        REVIEW OF SYSTEMS:    GENERAL:  No weight loss, malaise or fevers.  HEENT:   No recent changes in vision or hearing  NECK:  Negative for lumps,  difficulty with swallowing associated with scleroderma.  RESPIRATORY:  Negative for cough, wheezing or shortness of breath, patient denies any recent URI. Albuterol (history of ILD)  CARDIOVASCULAR:  Negative for chest pain, leg swelling or palpitations.  GI:  Negative for abdominal discomfort, blood in stools or black stools or change in bowel habits. GERD controlled zantac  MUSCULOSKELETAL:  See HPI.  SKIN:   Chronic low healing venous stasis ulcerations to bilateral lower extremities   PSYCH:  No mood disorder or recent psychosocial stressors.  Patients sleep is not disturbed secondary to pain.  HEMATOLOGY/LYMPHOLOGY:   History of iron deficiency anemia, takes daily iron supplementation.    ENDO: No history of diabetes or thyroid dysfunction  NEURO:   No history of headaches, syncope, paralysis, seizures or tremors.  All other reviewed and negative other than HPI.    OBJECTIVE:    BP (!) 134/59   Pulse 73   Resp 18   Ht 5' 5" (1.651 m)   Wt 71 kg (156 lb 8.4 oz)   BMI 26.05 kg/m²     PHYSICAL EXAMINATION:    GENERAL: Well appearing, in no acute distress, alert and oriented x3.  PSYCH:  Mood and affect appropriate.  SKIN: Tight skin associated with scleroderma. Dressing noted to bilateral feet.   HEAD/FACE:  Normocephalic, atraumatic. Cranial nerves grossly intact.  CV: RRR with palpation of the radial artery.  PULM: No evidence of respiratory difficulty, symmetric chest rise.  BACK:  There is no pain with palpation over the facet joints of the lumbar spine bilaterally. Limited ROM with mild pain on extension. Positive facet loading bilaterally.    EXTREMITIES: Contractures over on bilateral hands with ulcerations to knuckles, right greater than left. Boots noted to bilateral feet.   MUSCULOSKELETAL:  There is no pain to palpation over the greater " trochanteric bursa bilaterally.  FABERs test is positive bilaterally.  FADIRs test is negative.   Bilateral lower extremity strength testing limited secondary to pain in the feet.    NEURO: Bilateral lower extremity coordination and muscle stretch reflexes are physiologic and symmetric. Decreased sensation to BLE. Plantar response are downgoing. No clonus.  No loss of sensation is noted.  GAIT: Antalgic, ambulates without assistance         Lab Results   Component Value Date    WBC 4.25 09/09/2019    HGB 12.4 09/09/2019    HCT 38.3 09/09/2019    MCV 97 09/09/2019     09/09/2019     BMP  Lab Results   Component Value Date     09/09/2019    K 4.2 09/09/2019     09/09/2019    CO2 20 (L) 09/09/2019    BUN 13 09/09/2019    CREATININE 0.7 09/09/2019    CALCIUM 9.3 09/09/2019    ANIONGAP 11 09/09/2019    ESTGFRAFRICA >60.0 09/09/2019    EGFRNONAA >60.0 09/09/2019         ASSESSMENT: 68 y.o. year old female with pain, consistent with     Encounter Diagnoses   Name Primary?    Chronic pain disorder Yes    Cutaneous scleroderma     Idiopathic neuropathy        PLAN:     - Previous imaging was reviewed and discussed with the patient today.    - Continue Lyrica to 150 mg BID, #60. Refill provided today. Medication management provided by Dr. King.     - Continue to follow up with wound care.     - Continues home exercise routine.     - RTC in 3 months.     - Dr. King was consulted on the patient and agrees with this plan.    The above plan and management options were discussed at length with patient. Patient is in agreement with the above and verbalized understanding.     Viktoriya Camara NP  09/17/2019

## 2019-09-22 NOTE — PROGRESS NOTES
Subjective:       Patient ID: Yamel Shah is a 68 y.o. female.    Chief Complaint: Follow-up (4 Month)    HPI   The patient presents for follow-up of medical conditions.  She is status post treatment for pneumonia.  She is doing much better.  She denies having any shortness of breath, chills, fever, or sweats at this time.    Active medical conditions include pulmonary hypertension, scleroderma.  The patient is managing her chronic foot ulcers.  Intermittent dysphagia is reported.  Her appetite remained stable.  She is followed by Rheumatology and was recently started on CellCept.    Review of Systems   Constitutional: Negative for activity change, appetite change, fatigue and unexpected weight change.   HENT: Positive for trouble swallowing.    Eyes: Negative for visual disturbance.   Respiratory: Negative for cough and shortness of breath.    Cardiovascular: Negative for chest pain, palpitations and leg swelling.   Gastrointestinal: Negative for abdominal pain, blood in stool, constipation and diarrhea.   Genitourinary: Negative for dysuria and hematuria.   Musculoskeletal: Negative for arthralgias, neck pain and neck stiffness.   Skin: Positive for wound. Negative for rash.   Neurological: Negative for dizziness, syncope and headaches.   Psychiatric/Behavioral: Negative for sleep disturbance.       Objective:      Physical Exam   Constitutional: She is oriented to person, place, and time. She appears well-developed and well-nourished. No distress.   HENT:   Head: Normocephalic and atraumatic.   Eyes: Conjunctivae and EOM are normal. No scleral icterus.   Neck: Normal range of motion. Neck supple. No JVD present. No thyromegaly present.   Cardiovascular: Normal rate, regular rhythm, normal heart sounds and intact distal pulses. Exam reveals no gallop and no friction rub.   No murmur heard.  Pulmonary/Chest: Effort normal. No respiratory distress. She has no wheezes.   Abdominal: Soft. Bowel sounds are  normal. She exhibits no mass. There is no tenderness.   Musculoskeletal: Normal range of motion. She exhibits no tenderness.   Lymphadenopathy:     She has no cervical adenopathy.   Neurological: She is alert and oriented to person, place, and time.   Skin: Skin is warm and dry. No rash noted.   Wound dressings are in place on both feet.   Nursing note and vitals reviewed.      Assessment:       1. Pneumonia due to infectious organism, unspecified laterality, unspecified part of lung    2. Pulmonary hypertension associated with systemic disorder    3. Essential hypertension    4. Scleroderma    5. Idiopathic neuropathy        Plan:     Yamel was seen today for follow-up.  Current therapy will be continued.  A follow-up chest x-ray will be obtained in 1 month to assure clearing up.  The patient will follow up with Cardiology as scheduled.    Diagnoses and all orders for this visit:    Pneumonia due to infectious organism, unspecified laterality, unspecified part of lung  -     X-Ray Chest PA And Lateral; Future    Pulmonary hypertension associated with systemic disorder    Essential hypertension    Scleroderma    Idiopathic neuropathy

## 2019-09-24 ENCOUNTER — TELEPHONE (OUTPATIENT)
Dept: PHARMACY | Facility: CLINIC | Age: 69
End: 2019-09-24

## 2019-10-02 ENCOUNTER — OFFICE VISIT (OUTPATIENT)
Dept: PRIMARY CARE CLINIC | Facility: CLINIC | Age: 69
End: 2019-10-02
Payer: MEDICARE

## 2019-10-02 ENCOUNTER — LAB VISIT (OUTPATIENT)
Dept: LAB | Facility: HOSPITAL | Age: 69
End: 2019-10-02
Attending: NURSE PRACTITIONER
Payer: MEDICARE

## 2019-10-02 VITALS
DIASTOLIC BLOOD PRESSURE: 58 MMHG | HEIGHT: 65 IN | SYSTOLIC BLOOD PRESSURE: 136 MMHG | TEMPERATURE: 98 F | BODY MASS INDEX: 26.26 KG/M2 | OXYGEN SATURATION: 98 % | WEIGHT: 157.63 LBS | HEART RATE: 69 BPM

## 2019-10-02 DIAGNOSIS — D84.821 IMMUNOSUPPRESSION DUE TO DRUG THERAPY: ICD-10-CM

## 2019-10-02 DIAGNOSIS — J18.9 RECURRENT PNEUMONIA: Primary | ICD-10-CM

## 2019-10-02 DIAGNOSIS — H04.123 DRY EYES, BILATERAL: ICD-10-CM

## 2019-10-02 DIAGNOSIS — K21.9 GASTROESOPHAGEAL REFLUX DISEASE, ESOPHAGITIS PRESENCE NOT SPECIFIED: ICD-10-CM

## 2019-10-02 DIAGNOSIS — J18.9 RECURRENT PNEUMONIA: ICD-10-CM

## 2019-10-02 DIAGNOSIS — J30.89 ENVIRONMENTAL AND SEASONAL ALLERGIES: ICD-10-CM

## 2019-10-02 DIAGNOSIS — Z79.899 IMMUNOSUPPRESSION DUE TO DRUG THERAPY: ICD-10-CM

## 2019-10-02 LAB
BASOPHILS # BLD AUTO: 0.03 K/UL (ref 0–0.2)
BASOPHILS NFR BLD: 1 % (ref 0–1.9)
DIFFERENTIAL METHOD: ABNORMAL
EOSINOPHIL # BLD AUTO: 0 K/UL (ref 0–0.5)
EOSINOPHIL NFR BLD: 1.3 % (ref 0–8)
ERYTHROCYTE [DISTWIDTH] IN BLOOD BY AUTOMATED COUNT: 14.6 % (ref 11.5–14.5)
HCT VFR BLD AUTO: 35.7 % (ref 37–48.5)
HGB BLD-MCNC: 11.2 G/DL (ref 12–16)
IMM GRANULOCYTES # BLD AUTO: 0.02 K/UL (ref 0–0.04)
IMM GRANULOCYTES NFR BLD AUTO: 0.6 % (ref 0–0.5)
LYMPHOCYTES # BLD AUTO: 1.1 K/UL (ref 1–4.8)
LYMPHOCYTES NFR BLD: 34.3 % (ref 18–48)
MCH RBC QN AUTO: 31 PG (ref 27–31)
MCHC RBC AUTO-ENTMCNC: 31.4 G/DL (ref 32–36)
MCV RBC AUTO: 99 FL (ref 82–98)
MONOCYTES # BLD AUTO: 0.4 K/UL (ref 0.3–1)
MONOCYTES NFR BLD: 13.1 % (ref 4–15)
NEUTROPHILS # BLD AUTO: 1.6 K/UL (ref 1.8–7.7)
NEUTROPHILS NFR BLD: 49.7 % (ref 38–73)
NRBC BLD-RTO: 0 /100 WBC
PLATELET # BLD AUTO: 228 K/UL (ref 150–350)
PMV BLD AUTO: 12.1 FL (ref 9.2–12.9)
RBC # BLD AUTO: 3.61 M/UL (ref 4–5.4)
WBC # BLD AUTO: 3.12 K/UL (ref 3.9–12.7)

## 2019-10-02 PROCEDURE — 99215 OFFICE O/P EST HI 40 MIN: CPT | Mod: PBBFAC,PN,25 | Performed by: NURSE PRACTITIONER

## 2019-10-02 PROCEDURE — 99999 PR PBB SHADOW E&M-EST. PATIENT-LVL V: CPT | Mod: PBBFAC,,, | Performed by: NURSE PRACTITIONER

## 2019-10-02 PROCEDURE — 99214 PR OFFICE/OUTPT VISIT, EST, LEVL IV, 30-39 MIN: ICD-10-PCS | Mod: S$PBB,,, | Performed by: NURSE PRACTITIONER

## 2019-10-02 PROCEDURE — 99214 OFFICE O/P EST MOD 30 MIN: CPT | Mod: S$PBB,,, | Performed by: NURSE PRACTITIONER

## 2019-10-02 PROCEDURE — 36415 COLL VENOUS BLD VENIPUNCTURE: CPT | Mod: PN

## 2019-10-02 PROCEDURE — 85025 COMPLETE CBC W/AUTO DIFF WBC: CPT

## 2019-10-02 PROCEDURE — 99999 PR PBB SHADOW E&M-EST. PATIENT-LVL V: ICD-10-PCS | Mod: PBBFAC,,, | Performed by: NURSE PRACTITIONER

## 2019-10-02 PROCEDURE — 96372 THER/PROPH/DIAG INJ SC/IM: CPT | Mod: PBBFAC,PN

## 2019-10-02 RX ORDER — CODEINE PHOSPHATE AND GUAIFENESIN 10; 100 MG/5ML; MG/5ML
5-10 SOLUTION ORAL NIGHTLY
Qty: 120 ML | Refills: 0 | Status: SHIPPED | OUTPATIENT
Start: 2019-10-02 | End: 2019-11-12

## 2019-10-02 RX ORDER — LIDOCAINE HYDROCHLORIDE 10 MG/ML
1 INJECTION INFILTRATION; PERINEURAL
Status: COMPLETED | OUTPATIENT
Start: 2019-10-02 | End: 2019-10-02

## 2019-10-02 RX ORDER — CARBOXYMETHYLCELLULOSE SODIUM 5 MG/ML
SOLUTION/ DROPS OPHTHALMIC
COMMUNITY
Start: 2019-10-02

## 2019-10-02 RX ORDER — DOXYCYCLINE 100 MG/1
CAPSULE ORAL
COMMUNITY
Start: 2019-09-27 | End: 2019-11-12

## 2019-10-02 RX ORDER — AZITHROMYCIN 250 MG/1
TABLET, FILM COATED ORAL
COMMUNITY
Start: 2019-09-27 | End: 2019-11-12

## 2019-10-02 RX ORDER — CEFTRIAXONE 500 MG/1
500 INJECTION, POWDER, FOR SOLUTION INTRAMUSCULAR; INTRAVENOUS
Status: COMPLETED | OUTPATIENT
Start: 2019-10-02 | End: 2019-10-02

## 2019-10-02 RX ORDER — SODIUM FLUORIDE 6 MG/ML
PASTE, DENTIFRICE DENTAL
COMMUNITY
Start: 2014-04-07 | End: 2020-01-17

## 2019-10-02 RX ORDER — GUAIFENESIN 600 MG/1
TABLET, EXTENDED RELEASE ORAL
Qty: 60 TABLET | Refills: 1 | Status: SHIPPED | OUTPATIENT
Start: 2019-10-02 | End: 2019-11-12

## 2019-10-02 RX ORDER — MONTELUKAST SODIUM 10 MG/1
10 TABLET ORAL NIGHTLY
Qty: 30 TABLET | Refills: 0 | Status: SHIPPED | OUTPATIENT
Start: 2019-10-02 | End: 2019-11-01

## 2019-10-02 RX ADMIN — LIDOCAINE HYDROCHLORIDE 1 ML: 10 INJECTION INFILTRATION; PERINEURAL at 03:10

## 2019-10-02 RX ADMIN — CEFTRIAXONE SODIUM 500 MG: 1 INJECTION, POWDER, FOR SOLUTION INTRAMUSCULAR; INTRAVENOUS at 03:10

## 2019-10-02 NOTE — PROGRESS NOTES
Two patient identifiers verified.  Allergies reviewed.  Ceftriaxone 500 mg IM administered to Right Dorsagluteal per MD order.  Patient tolerated injection well; no redness, bleeding, or bruising noted to injection site.  Patient instructed to remain in clinic setting for 15 minutes.  Verbalizes understanding.

## 2019-10-02 NOTE — Clinical Note
"Dr. Ahuja, I saw Ms. Shah today for pneumonia that was diagnosed this past weekend while she was in [place].  She was also diagnosed with pneumonia on 9/6/19. The xray on 9/6 mentioned only "mild edema." The report (uploaded as clinic photo in media) for the cxr on 9/29 mentions bilateral infiltrates L>R. On 8/20, you increased her cellcept from 500mg BID to 1500mg BID. So I thought you would want to be aware of these developments. If I can be of further assistance, please let me know. Kind regards, JACINTA Mccoy"

## 2019-10-02 NOTE — PROGRESS NOTES
"Subjective:       Patient ID: Yamel Shah is a 68 y.o. female.    Chief Complaint: Cough (bad cough with acid reflux); Fever (body aches, chills, felt like flu symptoms, went to [facility] in [place] last week and was told she has pneumonia); and Headache    Ms. Shah presents today for follow up of pneumonia. She was in the [facility] area this past weekend. On Thursday 9/26 she went to bed with severe heart burn and awoke on Friday 9/27 with a bad cough, right sided chest pain, and fever. She went to [facility] urgent care and was diagnosed with pneumonia. This is her second bout of pneumonia in the month of September. She was started on azithromycin and doxycycline. She returned to that clinic 2 days later, on Sunday 9/29 and had a repeat CBC. She was told her WBC count was improving and so the treatment is effective. She flew back to [place] the following day. Since being home she does not feel she has improved. She has also noticed her eyes feel itchy and dry and now her nose is runny and she has a headache. She has been taking Tylenol for the headache, which provides mild relief.   Of note, her dose of cellcept was increased on 8/20/19 from 500 mg BID to 1500 mg BID. The first diagnosis of pneumonia this month occurred on 9/6/19 at the [facility] ER. PMH also includes ILD, pulmonary htn, GERD, scleroderma, Reynaud's, PAD with ulcerations of both feet and peripheral neuropathy.     Review of Systems   Constitutional: Positive for fatigue and fever.   HENT: Positive for rhinorrhea and sneezing. Negative for facial swelling.    Respiratory: Positive for cough and shortness of breath.    Cardiovascular: Positive for chest pain.   Gastrointestinal: Negative for diarrhea, nausea and vomiting.   Genitourinary: Negative for dysuria.   Musculoskeletal: Negative for gait problem.   Neurological: Positive for headaches.   Psychiatric/Behavioral: Negative for confusion.       Objective:    "   Physical Exam   Constitutional: She is oriented to person, place, and time. She appears well-developed and well-nourished. No distress.   HENT:   Head: Normocephalic.   Right Ear: Tympanic membrane and ear canal normal.   Left Ear: Tympanic membrane and ear canal normal.   Nose: Rhinorrhea present. No mucosal edema.   Eyes: Right eye exhibits no discharge and no exudate. Left eye exhibits no discharge and no exudate. Right conjunctiva is injected. Left conjunctiva is injected. No scleral icterus.   Neck: Normal range of motion. Neck supple.   Cardiovascular: Normal rate, regular rhythm and normal heart sounds.   Pulmonary/Chest: She has wheezes. She has rales.   Lymphadenopathy:     She has cervical adenopathy.   Neurological: She is alert and oriented to person, place, and time.   Skin: She is not diaphoretic.   Psychiatric: Her behavior is normal.   Nursing note and vitals reviewed.      Assessment:       1. Recurrent pneumonia    2. Gastroesophageal reflux disease, esophagitis presence not specified    3. Dry eyes, bilateral    4. Environmental and seasonal allergies    5. Immunosuppression due to drug therapy        Plan:   1. Recurrent pneumonia  - Ambulatory consult to Speech Therapy  - guaifenesin-codeine 100-10 mg/5 ml (TUSSI-ORGANIDIN NR)  mg/5 mL syrup; Take 5-10 mLs by mouth every evening.  Dispense: 120 mL; Refill: 0  - guaiFENesin (MUCINEX) 600 mg 12 hr tablet; Take 2 tablets in the morning with a large glass of water  Dispense: 60 tablet; Refill: 1  - cefTRIAXone injection 500 mg  - CBC auto differential; Future    2. Gastroesophageal reflux disease, esophagitis presence not specified  - Ambulatory consult to Speech Therapy    3. Dry eyes, bilateral  - carboxymethylcellulose sodium (REFRESH TEARS) 0.5 % Drop; Apply 1-2 drops to every as needed    4. Environmental and seasonal allergies  - montelukast (SINGULAIR) 10 mg tablet; Take 1 tablet (10 mg total) by mouth every evening.  Dispense: 30  tablet; Refill: 0    5. Immunosuppression due to drug therapy  - Hold cellcept while on antibiotics until respiratory infection is resolved, as recommended by Dr. Ahuja.   - This was communicated to the patient, post visit, by phone. She verbalized understanding of these instructions.     Pt has been given instructions populated from [employer] database and has verbalized understanding of the after visit summary and information contained wherein.    Follow up with a primary care provider. May go to ER for acute shortness of breath, lightheadedness, fever, or any other emergent complaints or changes in condition.

## 2019-10-03 ENCOUNTER — PATIENT MESSAGE (OUTPATIENT)
Dept: PHARMACY | Facility: CLINIC | Age: 69
End: 2019-10-03

## 2019-10-03 ENCOUNTER — TELEPHONE (OUTPATIENT)
Dept: SPEECH THERAPY | Facility: HOSPITAL | Age: 69
End: 2019-10-03

## 2019-10-03 ENCOUNTER — TELEPHONE (OUTPATIENT)
Dept: INTERNAL MEDICINE | Facility: CLINIC | Age: 69
End: 2019-10-03

## 2019-10-03 NOTE — TELEPHONE ENCOUNTER
Spoke with patient she was told she needs to check with her pharmacy to see if her medication was involved in the recall.

## 2019-10-03 NOTE — PROGRESS NOTES
"Digital Medicine: Health  Follow-Up    Patient reports she is well, no complaints.   Reports she has been busy lately, apologized for missing calls. Recovering from pneumonia.     The history is provided by the patient.           Diet:   Patient reports eating or drinking the following: fast food, soda, water, restaurant food and green tea, trying to cut back on soft drinks    The patient drinks 32 ounces of water per day.    She skips meals regularly.  late lunch/ skip dinner      The patient states that she typically eats a non-home cooked meal (ex: dining out, take out, frozen meals) 3-4 times per week.  She cooks for self, has meals prepared by family and .    Patient does the shopping for groceries and lets family grocery shop.  She gets groceries from the grocery store.      Not eating "as much" recently, has been sick    Typical meals consist of the following:    Breakfast: bagel with cream cheese, oatmeal with raisin and apple   Lunch: macaroni, green beans, [employer], [employer]   Dinner: sometimes skips   Snacks: small snickers, milkshakes from [employer] or [employer]    "Trying to watch salt intake".     Physical Activity:   When asked if exercising, patient responded: unable    Stated "my main activity is sleeping".     Medication Adherence:   She misses doses: once a week      Forgets to take BP medication when traveling.       [facility]    INTERVENTION(S)  recommended diet modifications and goal setting    PLAN  patient verbalizes understanding and patient amenable to changes    Declined additional health coaching/ resources at this time.       There are no preventive care reminders to display for this patient.    Last 5 Patient Entered Readings                                      Current 30 Day Average: 136/68     Recent Readings 10/1/2019 9/30/2019 9/29/2019 9/27/2019 9/25/2019    SBP (mmHg) 148 134 147 123 149    DBP (mmHg) 68 67 66 59 71    Pulse 103 90 110 85 72                "

## 2019-10-03 NOTE — TELEPHONE ENCOUNTER
----- Message from Mirian Garzon sent at 10/3/2019 10:48 AM CDT -----  Contact: self/ [phone]  Please call patient about her medication Ranitidine. It has been recalled.

## 2019-10-06 DIAGNOSIS — M34.9 SCLERODERMA: ICD-10-CM

## 2019-10-06 DIAGNOSIS — E78.5 HYPERLIPIDEMIA: ICD-10-CM

## 2019-10-07 ENCOUNTER — PATIENT MESSAGE (OUTPATIENT)
Dept: ADMINISTRATIVE | Facility: OTHER | Age: 69
End: 2019-10-07

## 2019-10-07 ENCOUNTER — PATIENT OUTREACH (OUTPATIENT)
Dept: OTHER | Facility: OTHER | Age: 69
End: 2019-10-07

## 2019-10-07 NOTE — PROGRESS NOTES
"Digital Medicine: Clinician Follow-Up    Called patient for follow-up. She reports adherence to medication regimen with no complaints and denies missed doses. Recently seen in the ED for SOB and treated for pneumonia. States that she has just started feeling better however, that blood pressure was "up and down" when ill. Most recent BP reading today WNL.     The history is provided by the patient. No  was used.     Follow Up  Follow-up reason(s): reading review      Readings are trending up due to acute illness.            Sleep Apnea  Patient not previously diagnosed with MARY and         Current 30-day average is slightly above goal of <130/80 mmHg though at goal in the past prior to developing pneumonia.       INTERVENTION(S)  encouragement/support    PLAN  await resolution of current disease process and additional monitoring needed    Continue current medication regimen.  Continue monitoring and follow-up in 4-6 weeks, sooner if needed.       There are no preventive care reminders to display for this patient.    Last 5 Patient Entered Readings                                      Current 30 Day Average: 136/68     Recent Readings 10/7/2019 10/1/2019 9/30/2019 9/29/2019 9/27/2019    SBP (mmHg) 125 148 134 147 123    DBP (mmHg) 61 68 67 66 59    Pulse 77 103 90 110 85             Hypertension Medications             NIFEdipine (ADALAT CC) 90 MG TbSR TAKE 1 TABLET(90 MG) BY MOUTH EVERY DAY    spironolactone (ALDACTONE) 25 MG tablet Take 1 tablet (25 mg total) by mouth once daily.               "

## 2019-10-08 RX ORDER — PRAVASTATIN SODIUM 20 MG/1
TABLET ORAL
Qty: 90 TABLET | Refills: 0 | Status: SHIPPED | OUTPATIENT
Start: 2019-10-08 | End: 2020-01-06 | Stop reason: SDUPTHER

## 2019-10-10 ENCOUNTER — TELEPHONE (OUTPATIENT)
Dept: PULMONOLOGY | Facility: CLINIC | Age: 69
End: 2019-10-10

## 2019-10-10 DIAGNOSIS — J84.9 ILD (INTERSTITIAL LUNG DISEASE): Primary | ICD-10-CM

## 2019-10-10 RX ORDER — TADALAFIL 20 MG/1
40 TABLET ORAL DAILY
Qty: 60 TABLET | Refills: 11 | Status: SHIPPED | OUTPATIENT
Start: 2019-10-10 | End: 2019-10-15

## 2019-10-11 ENCOUNTER — TELEPHONE (OUTPATIENT)
Dept: PULMONOLOGY | Facility: CLINIC | Age: 69
End: 2019-10-11

## 2019-10-14 ENCOUNTER — HOSPITAL ENCOUNTER (OUTPATIENT)
Dept: RADIOLOGY | Facility: HOSPITAL | Age: 69
Discharge: HOME | End: 2019-10-14
Attending: INTERNAL MEDICINE
Payer: MEDICARE

## 2019-10-14 DIAGNOSIS — J18.9 PNEUMONIA DUE TO INFECTIOUS ORGANISM, UNSPECIFIED LATERALITY, UNSPECIFIED PART OF LUNG: ICD-10-CM

## 2019-10-14 PROCEDURE — 71046 X-RAY EXAM CHEST 2 VIEWS: CPT | Mod: 26,,, | Performed by: RADIOLOGY

## 2019-10-14 PROCEDURE — 71046 XR CHEST PA AND LATERAL: ICD-10-PCS | Mod: 26,,, | Performed by: RADIOLOGY

## 2019-10-14 PROCEDURE — 71046 X-RAY EXAM CHEST 2 VIEWS: CPT | Mod: TC,PO

## 2019-10-15 ENCOUNTER — OFFICE VISIT (OUTPATIENT)
Dept: RHEUMATOLOGY | Facility: CLINIC | Age: 69
End: 2019-10-15
Payer: MEDICARE

## 2019-10-15 ENCOUNTER — LAB VISIT (OUTPATIENT)
Dept: LAB | Facility: HOSPITAL | Age: 69
End: 2019-10-15
Attending: INTERNAL MEDICINE
Payer: MEDICARE

## 2019-10-15 VITALS
BODY MASS INDEX: 25.07 KG/M2 | HEART RATE: 57 BPM | SYSTOLIC BLOOD PRESSURE: 150 MMHG | HEIGHT: 66 IN | DIASTOLIC BLOOD PRESSURE: 70 MMHG | WEIGHT: 156 LBS

## 2019-10-15 DIAGNOSIS — I87.2 VENOUS INSUFFICIENCY OF BOTH LOWER EXTREMITIES: ICD-10-CM

## 2019-10-15 DIAGNOSIS — M34.9 SCLERODERMA: ICD-10-CM

## 2019-10-15 DIAGNOSIS — I27.29 PULMONARY HYPERTENSION ASSOCIATED WITH SYSTEMIC DISORDER: ICD-10-CM

## 2019-10-15 DIAGNOSIS — J84.9 ILD (INTERSTITIAL LUNG DISEASE): ICD-10-CM

## 2019-10-15 DIAGNOSIS — L97.529 SKIN ULCERS OF BOTH FEET: ICD-10-CM

## 2019-10-15 DIAGNOSIS — K21.9 GASTROESOPHAGEAL REFLUX DISEASE, ESOPHAGITIS PRESENCE NOT SPECIFIED: ICD-10-CM

## 2019-10-15 DIAGNOSIS — L97.519 SKIN ULCERS OF BOTH FEET: ICD-10-CM

## 2019-10-15 DIAGNOSIS — M34.9 SCLERODERMA: Primary | ICD-10-CM

## 2019-10-15 LAB
BASOPHILS # BLD AUTO: 0.03 K/UL (ref 0–0.2)
BASOPHILS NFR BLD: 0.8 % (ref 0–1.9)
CRP SERPL-MCNC: 9.4 MG/L (ref 0–8.2)
DIFFERENTIAL METHOD: ABNORMAL
EOSINOPHIL # BLD AUTO: 0 K/UL (ref 0–0.5)
EOSINOPHIL NFR BLD: 0.5 % (ref 0–8)
ERYTHROCYTE [DISTWIDTH] IN BLOOD BY AUTOMATED COUNT: 14.7 % (ref 11.5–14.5)
ERYTHROCYTE [SEDIMENTATION RATE] IN BLOOD BY WESTERGREN METHOD: 49 MM/HR (ref 0–36)
HCT VFR BLD AUTO: 40.7 % (ref 37–48.5)
HGB BLD-MCNC: 12.6 G/DL (ref 12–16)
IMM GRANULOCYTES # BLD AUTO: 0 K/UL (ref 0–0.04)
IMM GRANULOCYTES NFR BLD AUTO: 0 % (ref 0–0.5)
LYMPHOCYTES # BLD AUTO: 1.2 K/UL (ref 1–4.8)
LYMPHOCYTES NFR BLD: 32.4 % (ref 18–48)
MCH RBC QN AUTO: 30.9 PG (ref 27–31)
MCHC RBC AUTO-ENTMCNC: 31 G/DL (ref 32–36)
MCV RBC AUTO: 100 FL (ref 82–98)
MONOCYTES # BLD AUTO: 0.3 K/UL (ref 0.3–1)
MONOCYTES NFR BLD: 8.4 % (ref 4–15)
NEUTROPHILS # BLD AUTO: 2.1 K/UL (ref 1.8–7.7)
NEUTROPHILS NFR BLD: 57.9 % (ref 38–73)
NRBC BLD-RTO: 0 /100 WBC
PLATELET # BLD AUTO: 217 K/UL (ref 150–350)
PMV BLD AUTO: 11.5 FL (ref 9.2–12.9)
RBC # BLD AUTO: 4.08 M/UL (ref 4–5.4)
WBC # BLD AUTO: 3.7 K/UL (ref 3.9–12.7)

## 2019-10-15 PROCEDURE — 85025 COMPLETE CBC W/AUTO DIFF WBC: CPT

## 2019-10-15 PROCEDURE — 99214 OFFICE O/P EST MOD 30 MIN: CPT | Mod: S$PBB,GC,, | Performed by: STUDENT IN AN ORGANIZED HEALTH CARE EDUCATION/TRAINING PROGRAM

## 2019-10-15 PROCEDURE — 85652 RBC SED RATE AUTOMATED: CPT

## 2019-10-15 PROCEDURE — 99213 OFFICE O/P EST LOW 20 MIN: CPT | Mod: PBBFAC | Performed by: STUDENT IN AN ORGANIZED HEALTH CARE EDUCATION/TRAINING PROGRAM

## 2019-10-15 PROCEDURE — 36415 COLL VENOUS BLD VENIPUNCTURE: CPT

## 2019-10-15 PROCEDURE — 86140 C-REACTIVE PROTEIN: CPT

## 2019-10-15 PROCEDURE — 99214 PR OFFICE/OUTPT VISIT, EST, LEVL IV, 30-39 MIN: ICD-10-PCS | Mod: S$PBB,GC,, | Performed by: STUDENT IN AN ORGANIZED HEALTH CARE EDUCATION/TRAINING PROGRAM

## 2019-10-15 PROCEDURE — 99999 PR PBB SHADOW E&M-EST. PATIENT-LVL III: ICD-10-PCS | Mod: PBBFAC,GC,, | Performed by: STUDENT IN AN ORGANIZED HEALTH CARE EDUCATION/TRAINING PROGRAM

## 2019-10-15 PROCEDURE — 99999 PR PBB SHADOW E&M-EST. PATIENT-LVL III: CPT | Mod: PBBFAC,GC,, | Performed by: STUDENT IN AN ORGANIZED HEALTH CARE EDUCATION/TRAINING PROGRAM

## 2019-10-15 RX ORDER — TADALAFIL 20 MG/1
40 TABLET ORAL DAILY
Qty: 180 TABLET | Refills: 3 | Status: SHIPPED | OUTPATIENT
Start: 2019-10-15 | End: 2020-01-14 | Stop reason: SDUPTHER

## 2019-10-15 RX ORDER — MYCOPHENOLATE MOFETIL 200 MG/ML
1000 POWDER, FOR SUSPENSION ORAL 2 TIMES DAILY
Qty: 480 ML | Refills: 1 | Status: SHIPPED | OUTPATIENT
Start: 2019-10-15 | End: 2020-01-14

## 2019-10-15 ASSESSMENT — ROUTINE ASSESSMENT OF PATIENT INDEX DATA (RAPID3)
PATIENT GLOBAL ASSESSMENT SCORE: .5
TOTAL RAPID3 SCORE: 2.61
MDHAQ FUNCTION SCORE: .7
PSYCHOLOGICAL DISTRESS SCORE: 3.3
FATIGUE SCORE: 5
PAIN SCORE: 5
AM STIFFNESS SCORE: 0, NO

## 2019-10-15 NOTE — PROGRESS NOTES
"   Patient ID: Yamel Shah is a 68 y.o. female.    Chief Complaint: F/u for systemic scleroderma disease management    Follow up for Systemic scleroderma. Last seen by Dr. Ahuja and myself on 8/20/2019.     Interval Hx: Since last visit, pt has increased mycophenolate to 1500 BID, (liquid formulation=1.5 tsp BID or 7.5ml BID). Since last visit, pt went to the ER with SOB and cough, diagnosed with PNA, unclear if/what abx she was given. She has traveled to [place] and then to [facility] but cough worsened and combined with fevers. She was seen in [place], [place] and given antibiotics, doxycycline and ceftriaxone IM x 1. She also messaged Dr. Ahuja and was told to hold her mycophenolate during this infection. Her last dose of abx was last Wednesday. She feels much improved today, no cough, SOB, or fever/chills.  She also reports looser skin. She states the slightly "shaking or anxious" feeling within 1st hr of taking the medication has improved. She takes the medicine with food. AM stiffness ~15 min.    Pt states she has been walking a lot per Dr. Claudio's (vas surgery) request. Will f/u in the future but LE ulcers improving and swelling decreased. Has appt with Dr. Leigh with pulm on 10/25. Saw Dr. Lim for Pulm HTN on 9/9, continues on Adcirca (tadalifil). 6 MWT on 9/9 "exercise capacity maybe somewhate worse," but hasn't been able to do one in 3 yrs. UTD immunizations.    Initial Hx:   67YF with Systemic scleroderma, diagnosed 1983- She has been seeing Dr. Ahuja for over 10 years.( +SSA, +SCL-70, -RNAP3, ILD). She has stable ILD on imaging 9/2015 and had mild exercise induced pulmonary hypertension in 2013 that has resolved on 6/2015 RHC and most recent ECHO done 3/2019 shows normal EF with grade 2 diastolic dysfunction, increased sPAP 41 compared to 28mmHg in 06/2018. PFTs 4/2016 showed normal DLCO, RV, FEV1 with mild restriction. Repeat PFTs 03/2018 show normal DLco, decreased RV (89-->64), " "SVC remains the same at 70 and FVC 70. Repeat PFTs 03/2019 showed FVC 60, SVC 64, RV 61, DLco 82.      03/2019 CT chest/abd was done which showed stable appearing chronic interstitial lung disease consistent with patient's known history of scleroderma; there is new tree-in-bud opacity noted in the right upper lobe which while nonspecific can be seen in the setting of inflammatory infectious processes such as aspiration. Pt was referred to Dr Barry for evaluation of ILD.   As per Dr Barry note " Patient with significant scleroderma multiorgan involvement, Her PFTs reveal trend towards decrease in FVC,A 6MWT is not available at this time,Patient does have poor exercise function because of pain in the feet when she ambulates.  - She may benefit from starting on Immunosuppression with mycophenolate, although risks and benefit need to be reviewed further since she has had scleroderma since 1983. We initiated and provided the patient with basic information on the medication. She is going to review with Dr. Ahuja as well.   - Recommend to repeat PFTs w/DLCO and 6MWT (If patient can tolerate) every 6 months. May enroll in Pulmonary rehab once leg ulcers improve.     Seen by Dr Lim 03/2019 as per note "Given appearance of diastolic dysfxn on echo today and uncontrolled HTN/swelling, will go ahead and add aldactone 25mg daily today- needs BMP/BNP this am and again in 1 wk,no PH medicine changes today. From a PH standpoint, based on her echo and last RHC she appears to be doing very well- PFTs to be repeated per Dr Ahuja, chest imaging stable"    She had seen Vascular surgery in 03/2018 who will consider EVLT once ulcers have healed.  Pt is currently on nifedipine 90mg, pravastatin 20mg and adcirca 40mg     Recently retired in Dec 2018, worked as a .     Review of Systems   Constitutional: Negative for fever and unexpected weight change.   HENT: Negative for trouble swallowing.    Eyes: Negative for " "redness.   Respiratory: Positive for cough (improved). Negative for shortness of breath.    Cardiovascular: Negative for chest pain.   Gastrointestinal: Negative for constipation and diarrhea.   Genitourinary: Negative for dysuria and genital sores.   Skin: Negative for rash.   Neurological: Negative for headaches.   Hematological: Does not bruise/bleed easily.         Objective:   BP (!) 150/70   Pulse (!) 57   Ht 5' 6" (1.676 m)   Wt 70.8 kg (156 lb)   BMI 25.18 kg/m²      Physical Exam   Vitals reviewed.  Constitutional: She is oriented to person, place, and time and well-developed, well-nourished, and in no distress. No distress.   HENT:   Head: Normocephalic and atraumatic.   Right Ear: External ear normal.   Left Ear: External ear normal.   Nose: Nose normal.   Mouth/Throat: Oropharynx is clear and moist. No oropharyngeal exudate.   Eyes: Conjunctivae and EOM are normal. Pupils are equal, round, and reactive to light. Right eye exhibits no discharge. Left eye exhibits no discharge. No scleral icterus.   Neck: Neck supple. No thyromegaly present.   Cardiovascular: Normal rate, regular rhythm, normal heart sounds and intact distal pulses.    No murmur heard.  Pulmonary/Chest: Effort normal. No respiratory distress. She has no wheezes. She exhibits no tenderness.   B/l crackles   Abdominal: Soft. Bowel sounds are normal. She exhibits no distension. There is no tenderness. There is no guarding.   Neurological: She is alert and oriented to person, place, and time.   Skin: Skin is warm and dry. No rash noted. She is not diaphoretic. No erythema.     LE ulcers improving. Decreased swelling from prior   Psychiatric: Mood and affect normal.   Musculoskeletal: Normal range of motion. She exhibits deformity. She exhibits no edema or tenderness.   Please see pics from prior visit.  Skin tightness  Claw hand deformity, flexion contractures b/l  LE bandaged and covered today, improving. See pics  Skin score: 30      "         [facility] 1/2017  Electronically reviewed and signed by:   Camila Soliman M.D.   Signed on 01/16/17 at 15:54   Increased total protein. Increased gamma globulin, polyclonal.      US Venous 06/2019  Impression:   Right Leg:  Color flow evaluation of the lower extremity demonstrates fully compressible and patent veins; however, due to heavy pitting edema, one of the paired peroneal veins cannot be appreciated which could suggest chronic occlusive thrombus versus   poor imaging. There is no evidence of venous thrombosis in the remaining deep or superficial veins.  Significant reflux is noted in the GSV including the saphenofemoral junction (SFJ) and the SSV including the saphenopopliteal junction (SPJ). There is no   evidence of reflux in the Accessory vein. Many branches are noted throughout the entire course of the GSV and SSV.  Incidental finding of significant reflux is noted in the FV and POP V.    Left Leg:  Color flow evaluation of the lower extremity demonstrates chronic occlusive thrombus in a small segment of the GSV BK. There is no evidence of venous thrombosis in the remaining deep or superficial veins.  Significant reflux is noted in the   GSV including the saphenofemoral junction (SFJ) and the SSV including the saphenopopliteal junction (SPJ). There is no evidence of reflux in the Accessory vein. Noted is a diameter decrease in the GSV from 4.8 mm to 2.6 mm at the level of the distal   thigh and 1.9 mm at the knee.  Multiple branches of the GSV are also noted throughout its entire course. Incidental finding of significant reflux is noted in the FV and POP V.     EGD 01/2017  Normal esophagus. Two biopsies were obtained in                         the upper third of the esophagus.                        - Z-line irregular. Biopsied.                        - Esophageal polyp(s) were found. Resected and                         retrieved.                        - Normal stomach.                        -  Congested duodenal mucosa. Biopsied.  Recommendation:  - Await pathology results.                        - Discharge patient to home (ambulatory).                        - Resume previous diet.                        - Continue present medications.                        - Repeat the upper endoscopy in 6 months to assure                         complete removal of polyp at GE junction.        EGD 03/2019  Impression:    - Dilation in the entire esophagus.                        - Z-line irregular, 38 cm from the incisors.                         Biopsied.                        - 3 cm hiatal hernia.                        - Benign-appearing esophageal stenosis.                        - Normal stomach.                        - A few gastric polyps. Biopsied.                        - Normal examined duodenum. Biopsied.  Recommendation:  - Await pathology results.                        - Discharge patient to home (with escort).                        - Resume previous diet.     C-scope 03/2019  Impression:     - Five 2 to 5 mm polyps in the transverse colon,                         removed with a jumbo cold forceps. Resected and                         retrieved.                        - The entire examined colon is normal.                        - The distal rectum and anal verge are normal on                         retroflexion view.                        - The examined portion of the ileum was normal.  Recommendation:    - Discharge patient to home (with escort).     CT chest/abd 03/2019                   Impression         Stable appearing chronic interstitial lung disease consistent with patient's known history of scleroderma; it is worth noting that there is new tree-in-bud opacity noted in the right upper lobe which while nonspecific can be seen in the setting of inflammatory infectious processes such as aspiration.    Persistently fluid-filled esophagus which can place the patient at risk for aspiration             DEXA 09/2018  Osteopenia. There is a 11.9% risk of a major osteoporotic fracture and a 2.6% risk of hip fracture in the next 10 years (FRAX).  Compared with previous DXA, BMD at the lumbar spine has remained stable, and the BMD at the total hip has decreased by -7.4%     2D ECHO 03/2019  · Normal left ventricular systolic function. The estimated ejection fraction is 60%  · Grade II (moderate) left ventricular diastolic dysfunction consistent with pseudonormalization.  · Elevated left atrial pressure.  · No wall motion abnormalities.  · Normal right ventricular systolic function.  · Mild left atrial enlargement.  · Mild mitral regurgitation.  · The estimated PA systolic pressure is 41 mm Hg  · Normal central venous pressure (3 mm Hg).     CT chest 09/2015  Findings:  Examination of the vascular and soft tissue structures at the base of the neck is unremarkable.  The thoracic aorta maintains normal caliber, contour, and course without significant atherosclerotic calcification within its course.  The heart is not enlarged and there is no evidence of pericardial effusion. The esophagus is mildly dilated and   retained pneumatized secretions are identified within the dependent portion of the esophagus placing the patient at risk for aspiration and also suggestive esophageal dysmotility associated with scleroderma or reflux. There is no axillary, mediastinal,   or hilar lymphadenopathy.    A benign appearing but enlarged left axillary lymph node is identified, which appears stable to the prior examination dated 11/27/07.  Scattered normal sized and benign appearing mediastinal lymphadenopathy is also appreciated, also grossly similar to   the prior examination.     The lungs again are symmetrically expanded and demonstrate chronic generalized lung disease with small opacities and reticulations in a peripheral predominance.  This again is most notable in the lung bases were there is dilatation of small airways  and faint groundglass opacity.  Findings appear grossly stable to the prior chest CT in 2007 and are consistent with interstitial lung disease as can be seen with scleroderma, pulmonary fibrosis or interstitial pneumonitis.  No focal mass lesion is identified.Limited views of the abdomen demonstrate nothing unusual on this noncontrast examination.  The osseous structures demonstrate mild degenerative changes and a stable appearing compression deformity of the T9 vertebral body.        PFTs                FVC     SVC      RV     DLco  3/12/19   60         64         61        82  3/6/18      64         70         64      112  4/8/16      70         70         89       84  9/4/15      66         62         72       71  1/19/15    66           2/14/14    64                                 87       Assessment:       1. Scleroderma    2. ILD (interstitial lung disease)    3. Skin ulcers of both feet    4. Venous insufficiency of both lower extremities    5. Pulmonary hypertension associated with systemic disorder    6. Gastroesophageal reflux disease, esophagitis presence not specified            Plan:       Problem List Items Addressed This Visit        Derm    Skin ulcers of both feet       Pulmonary    Pulmonary hypertension associated with systemic disorder (Chronic)    ILD (interstitial lung disease)       Cardiac/Vascular    Venous insufficiency of both lower extremities       Immunology/Multi System    Scleroderma - Primary    Relevant Orders    CBC auto differential    Sedimentation rate    C-reactive protein       GI    GERD (gastroesophageal reflux disease)        Follow up for Systemic scleroderma. Last seen by Dr. Ahuja and myself on 8/20/2019.     Interval Hx: Since last visit, pt has increased mycophenolate to 1500 BID, (liquid formulation=1.5 tsp BID or 7.5ml BID). Since last visit, pt went to the ER with SOB and cough, diagnosed with PNA, unclear if/what abx she was given. She has traveled to [place] and  "then to [facility] but cough worsened and combined with fevers. She was seen in [place], [place] and given antibiotics, doxycycline and ceftriaxone IM x 1. She also messaged Dr. Ahuja and was told to hold her mycophenolate during this infection. Her last dose of abx was last Wednesday. She feels much improved today, no cough, SOB, or fever/chills.  She also reports looser skin. She states the slightly "shaking or anxious" feeling within 1st hr of taking the medication has improved. She takes the medicine with food. AM stiffness ~15 min.    Pt states she has been walking a lot per Dr. Claudio's ([facility] surgery) request. Will f/u in the future but LE ulcers improving and swelling decreased. Has appt with Dr. Leigh with pulm on 10/25. Saw Dr. Lim for Pulm HTN on 9/9, continues on Adcirca (tadalifil). 6 MWT on 9/9 "exercise capacity maybe somewhate worse," but hasn't been able to do one in 3 yrs. UTD immunizations.    Plan:  - labs today: CBC, ESR, CRP. Standing labs in 1 month.  - Decrease mycophenolate to 1000 mg BID (liquid formulation=1.5 tsp BID or 7.5ml BID) given leukopenia and anemia  - Discussed to hold mycophenolate for any infections or antibiotic use  - UTD flu vaccine    Discussed with Dr. Ahuja. RTC 3 months.    Gretchen Madison,   Rheumatology, PGY4  "

## 2019-10-16 ENCOUNTER — TELEPHONE (OUTPATIENT)
Dept: PRIMARY CARE CLINIC | Facility: CLINIC | Age: 69
End: 2019-10-16

## 2019-10-16 ENCOUNTER — TELEPHONE (OUTPATIENT)
Dept: SPEECH THERAPY | Facility: HOSPITAL | Age: 69
End: 2019-10-16

## 2019-10-16 DIAGNOSIS — J18.9 RECURRENT PNEUMONIA: ICD-10-CM

## 2019-10-16 DIAGNOSIS — K21.9 GASTROESOPHAGEAL REFLUX DISEASE, ESOPHAGITIS PRESENCE NOT SPECIFIED: Primary | ICD-10-CM

## 2019-10-16 NOTE — TELEPHONE ENCOUNTER
I recommended that Ms. Shah see [facility] [place] a swallow study. I heard back from that department that I need to order the study, not them. So, I have ordered it. Can you (or the ) reach out to her to schedule this?  Thanks

## 2019-10-16 NOTE — PROGRESS NOTES
I have personally taken the history and examined the patient and agree with the fellow's note as stated above with the following exceptions: the dose of mycophenolate liquid 200mg/ml, new reduced dose is 1000mg(5ml) po twice daily, not 7.5ml twice daily. SARINA

## 2019-10-23 ENCOUNTER — HOSPITAL ENCOUNTER (OUTPATIENT)
Dept: PULMONOLOGY | Facility: CLINIC | Age: 69
Discharge: HOME | End: 2019-10-23
Payer: MEDICARE

## 2019-10-23 VITALS — BODY MASS INDEX: 25.99 KG/M2 | WEIGHT: 156 LBS | HEIGHT: 65 IN

## 2019-10-23 DIAGNOSIS — J84.9 ILD (INTERSTITIAL LUNG DISEASE): ICD-10-CM

## 2019-10-23 PROCEDURE — 94618 PULMONARY STRESS TESTING: CPT | Mod: 26,S$PBB,, | Performed by: INTERNAL MEDICINE

## 2019-10-23 PROCEDURE — 94618 PULMONARY STRESS TESTING: ICD-10-PCS | Mod: 26,S$PBB,, | Performed by: INTERNAL MEDICINE

## 2019-10-23 PROCEDURE — 94618 PULMONARY STRESS TESTING: CPT | Mod: PBBFAC | Performed by: INTERNAL MEDICINE

## 2019-10-24 ENCOUNTER — TELEPHONE (OUTPATIENT)
Dept: PHARMACY | Facility: CLINIC | Age: 69
End: 2019-10-24

## 2019-10-24 NOTE — TELEPHONE ENCOUNTER
Pt plans to pickup Cellcept suspension (5ml BID) from [facility] on 10/25. Name and  verified, $10 copay in 004. Pt will restart her Cellcept tomorrow. Pt has a full bottle on hand, but this was picked up . Advised pt to discard remaining doses on hand since Cellcept suspension is stable for 60days once reconstituted. Pt voiced understanding and had no further questions.

## 2019-10-25 ENCOUNTER — OFFICE VISIT (OUTPATIENT)
Dept: PULMONOLOGY | Facility: CLINIC | Age: 69
End: 2019-10-25
Payer: MEDICARE

## 2019-10-25 VITALS
HEIGHT: 65 IN | WEIGHT: 158.94 LBS | BODY MASS INDEX: 26.48 KG/M2 | HEART RATE: 89 BPM | DIASTOLIC BLOOD PRESSURE: 62 MMHG | SYSTOLIC BLOOD PRESSURE: 110 MMHG | OXYGEN SATURATION: 98 %

## 2019-10-25 DIAGNOSIS — M34.9 SCLERODERMA WITH PULMONARY INVOLVEMENT: Primary | ICD-10-CM

## 2019-10-25 DIAGNOSIS — I27.29 PULMONARY HYPERTENSION ASSOCIATED WITH SYSTEMIC DISORDER: ICD-10-CM

## 2019-10-25 DIAGNOSIS — K21.9 GASTROESOPHAGEAL REFLUX DISEASE, ESOPHAGITIS PRESENCE NOT SPECIFIED: ICD-10-CM

## 2019-10-25 PROCEDURE — 99214 OFFICE O/P EST MOD 30 MIN: CPT | Mod: S$PBB,,, | Performed by: INTERNAL MEDICINE

## 2019-10-25 PROCEDURE — 99213 OFFICE O/P EST LOW 20 MIN: CPT | Mod: PBBFAC | Performed by: INTERNAL MEDICINE

## 2019-10-25 PROCEDURE — 99999 PR PBB SHADOW E&M-EST. PATIENT-LVL III: CPT | Mod: PBBFAC,,, | Performed by: INTERNAL MEDICINE

## 2019-10-25 PROCEDURE — 99999 PR PBB SHADOW E&M-EST. PATIENT-LVL III: ICD-10-PCS | Mod: PBBFAC,,, | Performed by: INTERNAL MEDICINE

## 2019-10-25 PROCEDURE — 99214 PR OFFICE/OUTPT VISIT, EST, LEVL IV, 30-39 MIN: ICD-10-PCS | Mod: S$PBB,,, | Performed by: INTERNAL MEDICINE

## 2019-10-25 RX ORDER — OMEPRAZOLE 40 MG/1
40 CAPSULE, DELAYED RELEASE ORAL
Qty: 180 CAPSULE | Refills: 3 | Status: SHIPPED | OUTPATIENT
Start: 2019-10-25 | End: 2020-03-04

## 2019-10-25 NOTE — PROGRESS NOTES
Subjective:       Patient ID: Yamel Shah is a 68 y.o. female.    Chief Complaint: Interstitial Lung Disease    Patient presents today for routine advanced lung disease clinic follow-up.  She has a diagnosis of Sc-ILD.  She follows with Dr. Ahuja for her scleroderma.  Since her last visit, she had been started on MMF.  This was held as she developed a fever/pneumonia symptoms earlier this month.  She was seen in the ED at [facility] [place] and given abx.  She then traveled and was seen at an  who again gave her abx.  Most recently, she was seen at a local  and given a rocephin shot.  Since then, she has been doing well.  States that it took her longer than she anticipated for her to get over her illness but that she is now back to her baseline.  She was previously limited in mobility due to ulcers on her feet.  These are now improving and she states that she has been more active with little to no respiratory complaints.  Her main issue is GERD.  She states that she gets constant burning in her throat and a dry cough/bad taste in her mouth that is present in the mornings.  She currently takes omeprazole daily and zantac.      Review of Systems   Constitutional: Negative for fever, chills, weight loss, weight gain, activity change, appetite change, fatigue, night sweats and weakness.   HENT: Negative for nosebleeds, postnasal drip, rhinorrhea, sinus pressure, sore throat, trouble swallowing, voice change, congestion, ear pain and hearing loss.    Eyes: Negative for redness.   Respiratory: Negative for snoring, cough, sputum production, choking, chest tightness, shortness of breath, wheezing, orthopnea, dyspnea on extertion and Paroxysmal Nocturnal Dyspnea.    Cardiovascular: Negative for chest pain, palpitations and leg swelling.   Genitourinary: Negative for difficulty urinating.   Endocrine: Negative for polydipsia, cold intolerance, heat intolerance and polyuria.    Musculoskeletal: Positive for gait problem.  Negative for arthralgias, back pain, joint swelling and myalgias.   Skin: Negative for rash.   Gastrointestinal: Positive for acid reflux. Negative for nausea, vomiting, abdominal pain and abdominal distention.   Neurological: Negative for dizziness, syncope, weakness, light-headedness and headaches.   Hematological: Negative for adenopathy. Does not bruise/bleed easily.   Psychiatric/Behavioral: Negative for confusion. The patient is not nervous/anxious.        Objective:      Physical Exam   Constitutional: She is oriented to person, place, and time. She appears well-developed and well-nourished. No distress.   HENT:   Head: Normocephalic.   Nose: Nose normal.   Mouth/Throat: Oropharynx is clear and moist. Normal dentition. No oropharyngeal exudate.   Neck: Normal range of motion. Neck supple. No JVD present. No tracheal deviation present. No thyromegaly present.   Cardiovascular: Normal rate, regular rhythm, normal heart sounds and intact distal pulses. Exam reveals no gallop and no friction rub.   No murmur heard.  Pulmonary/Chest: Normal expansion, symmetric chest wall expansion and effort normal. No respiratory distress. She has no decreased breath sounds. She has no wheezes. She has no rhonchi. She has rales in the right lower field and the left lower field. She exhibits no tenderness.   Abdominal: Soft. Bowel sounds are normal. She exhibits no distension. There is no tenderness.   Musculoskeletal: Normal range of motion. She exhibits no edema, tenderness or deformity.   Lymphadenopathy:     She has no cervical adenopathy.   Neurological: She is alert and oriented to person, place, and time. Gait normal.   Skin: Skin is warm and dry. She is not diaphoretic.   Psychiatric: She has a normal mood and affect. Judgment normal.   Nursing note and vitals reviewed.    Personal Diagnostic Review  6MW 10/23/2019 9/9/2019 9/4/2015 1/19/2015 2/14/2014 6/18/2013 1/8/2013   6MWT Status completed without stopping completed  without stopping not completed completed without stopping completed without stopping - completed without stopping   Patient Reported Dyspnea;Leg pain Dyspnea - No complaints No complaints No complaints No complaints   Was O2 used? No No No No No No No   6MW Distance walked (feet) 1362 1100 - 1700 1500 1800 1740   Distance walked (meters) 415.14 335.28 - 518.16 457.2 548.64 530.35   Did patient stop? No No - No No No No   Oxygen Saturation 99 98 99 98 100 96 98   Supplemental Oxygen Room Air Room Air Room Air Room Air Room Air Room Air Room Air   Heart Rate 76 63 67 65 65 89 71   Blood Pressure 133/80 146/67 124/65 135/62 112/55 128/60 133/77   Ju Dyspnea Rating  light light moderate very, very light (just noticeable) moderate light moderate   Oxygen Saturation 99 98 - 100 100 96 96   Supplemental Oxygen Room Air Room Air - Room Air Room Air Room Air Room Air   Heart Rate 92 80 - 49 93 120 100   Blood Pressure 136/60 129/58 - 143/60 147/66 143/63 165/71   Ju Dyspnea Rating  moderate moderate - light moderate moderate very light   Recovery Time (seconds) 78 180 - 72 61 70 140   Oxygen Saturation 99 99 - 100 100 98 98   Supplemental Oxygen Room Air Room Air - Room Air Room Air Room Air Room Air   Heart Rate 71 67 - 71 88 99 78           Assessment:       1. Scleroderma with pulmonary involvement    2. Pulmonary hypertension associated with systemic disorder    3. Gastroesophageal reflux disease, esophagitis presence not specified        Outpatient Encounter Medications as of 10/25/2019   Medication Sig Dispense Refill    albuterol (VENTOLIN HFA) 90 mcg/actuation inhaler Inhale 2 puffs into the lungs every 4 (four) hours as needed for Wheezing. Rescue 18 g 1    azithromycin (Z-IMELDA) 250 MG tablet       b complex vitamins tablet Take 1 tablet by mouth once daily.      carboxymethylcellulose sodium (REFRESH TEARS) 0.5 % Drop Apply 1-2 drops to every as needed      doxycycline (VIBRAMYCIN) 100 MG Cap        ergocalciferol (ERGOCALCIFEROL) 50,000 unit Cap Take 1 capsule (50,000 Units total) by mouth every 7 days. 12 capsule 1    ferrous sulfate 325 (65 FE) MG EC tablet Take 1 tablet (325 mg total) by mouth 3 (three) times daily. (Patient taking differently: Take 325 mg by mouth once daily. )  0    flu vacc [id number],65yr up,PF (FLUZONE HIGH-DOSE 2019-20, PF,) 180 mcg/0.5 mL Syrg INJECT INTO THE MUSCLE. 0.5 mL 0    fluoride, sodium, (PREVIDENT 5000) 1.1 % Pste       guaiFENesin (MUCINEX) 600 mg 12 hr tablet Take 2 tablets in the morning with a large glass of water 60 tablet 1    guaifenesin-codeine 100-10 mg/5 ml (TUSSI-ORGANIDIN NR)  mg/5 mL syrup Take 5-10 mLs by mouth every evening. 120 mL 0    montelukast (SINGULAIR) 10 mg tablet Take 1 tablet (10 mg total) by mouth every evening. 30 tablet 0    multivitamin capsule Take 1 capsule by mouth once daily.       NIFEdipine (ADALAT CC) 90 MG TbSR TAKE 1 TABLET(90 MG) BY MOUTH EVERY DAY 90 tablet 3    pravastatin (PRAVACHOL) 20 MG tablet TAKE 1 TABLET BY MOUTH EVERY EVENING 90 tablet 0    pregabalin (LYRICA) 150 MG capsule Take 1 capsule (150 mg total) by mouth 2 (two) times daily. 60 capsule 2    ranitidine (ZANTAC) 150 MG capsule TAKE 1 CAPSULE(150 MG) BY MOUTH TWICE DAILY (Patient taking differently: TAKE 1 CAPSULE(150 MG) BY MOUTH DAILY EVENINGS) 60 capsule 0    spironolactone (ALDACTONE) 25 MG tablet Take 1 tablet (25 mg total) by mouth once daily. 30 tablet 11    tadalafil (ADCIRCA) 20 mg Tab Take 2 tablets (40 mg total) by mouth once daily. 180 tablet 3    mycophenolate mofetil (CELLCEPT) 200 mg/mL SusR Take 5 mLs (1,000 mg total) by mouth 2 (two) times daily. (Patient not taking: Reported on 10/25/2019) 480 mL 1    omeprazole (PRILOSEC) 40 MG capsule Take 1 capsule (40 mg total) by mouth 2 (two) times daily before meals. 180 capsule 3    [DISCONTINUED] omeprazole (PRILOSEC) 40 MG capsule Take 1 capsule (40 mg total) by mouth 2 (two) times daily  before meals. (Patient taking differently: Take 40 mg by mouth every morning. ) 180 capsule 3     No facility-administered encounter medications on file as of 10/25/2019.      No orders of the defined types were placed in this encounter.      Plan:       1. Currently followed for Sc-ILD.  Her radiographic findings and PFTs have been stable.  Symptomatically she is doing well; has recovered from her recent acute illness.  Has plans to restart her MMF per Dr. Ahuja.  Continue with MMF.  Will continue to monitor her lung function and monitor her FVC and DLCO over time.  Would see how she does on MMF and if her lung function remains stable, would not advocate for more therapy.  She may be a candidate for addition of OFEV but I would not offer this at this time due to her uncontrolled GI symptoms. She is very interested in pulmonary rehab and I will place a referral today.     2. Continue to follow with Dr. Lim for PH.  Her disease appears well controlled and she is on adcirca.  No desaturations noted on 6MWT.      3. Her GERD appears uncontrolled with known dilated esophagus and presumed dysmotility as seen on EGD and CT chest.  I recommend aggressive treatment and lifestyle modifications as GERD can trigger ongoing pulmonary fibrosis.  I increased her PPI to BID today.  Discussed the importance of limiting oral intake 2-4 hours prior to bed.  Recommended that she position her bed at a slight incline and limit spicy/fatty food intake.  She verbalized understanding.    4. Follow-up in 3 months with repeat PFTs.      Roberta Leigh D.O.  Pulmonary/Critical Care Medicine

## 2019-10-28 NOTE — PROCEDURES
Yamel Shah is a 68 y.o.  female patient, who presents for a 6 minute walk test ordered by Roberta Leigh DO.  The diagnosis is Scleroderma; Pulmonary Hypertension; Interstitial Lung Disease.  The patient's BMI is 26 kg/m2.  Predicted distance (lower limit of normal) is 325.15 meters.      Test Results:    The test was completed without stopping.  The total time walked was 360 seconds.  During walking, the patient reported:  Dyspnea, Leg pain.  The patient used no assistive devices during testing.     10/23/2019---------Distance: 415.14 meters (1362 feet)     O2 Sat % Supplemental Oxygen Heart Rate Blood Pressure Ju Scale   Pre-exercise  (Resting) 99 % Room Air 76 bpm 133/80 mmHg 2   During Exercise 99 % Room Air 92 bpm 136/60 mmHg 3   Post-exercise  (Recovery) 99 % Room Air  71 bpm       Recovery Time:  78 seconds    Performing nurse/tech:  Ap ZALDIVAR      PREVIOUS STUDY:   09/09/2019---------Distance: 335.28 meters (1100 feet)       O2 Sat % Supplemental Oxygen Heart Rate Blood Pressure Ju Scale   Pre-exercise  (Resting) 98 % Room Air 63 bpm 146/67 mmHg 2   During Exercise   98 % Room Air 80 bpm 129/58 mmHg 3   Post-exercise  (Recovery) 99 % Room Air  67 bpm 128/62 mmHg        CLINICAL INTERPRETATION:  Six minute walk distance is 415.14 meters (1362 feet) with moderate dyspnea.  During exercise, there was no desaturation while breathing room air.  Both blood pressure and heart rate remained stable with walking.  The patient reported non-pulmonary symptoms during exercise.  Since the previous study in September 2019, exercise capacity is significantly improved.  Based upon age and body mass index, exercise capacity is normal.

## 2019-10-30 ENCOUNTER — PATIENT MESSAGE (OUTPATIENT)
Dept: GASTROENTEROLOGY | Facility: CLINIC | Age: 69
End: 2019-10-30

## 2019-10-30 ENCOUNTER — TELEPHONE (OUTPATIENT)
Dept: RADIOLOGY | Facility: HOSPITAL | Age: 69
End: 2019-10-30

## 2019-10-30 ENCOUNTER — PATIENT OUTREACH (OUTPATIENT)
Dept: OTHER | Facility: OTHER | Age: 69
End: 2019-10-30

## 2019-10-30 DIAGNOSIS — K21.9 GASTROESOPHAGEAL REFLUX DISEASE, ESOPHAGITIS PRESENCE NOT SPECIFIED: Primary | ICD-10-CM

## 2019-10-30 RX ORDER — FAMOTIDINE 20 MG/1
20 TABLET, FILM COATED ORAL 2 TIMES DAILY
Qty: 60 TABLET | Refills: 11 | Status: SHIPPED | OUTPATIENT
Start: 2019-10-30 | End: 2019-12-17

## 2019-10-31 ENCOUNTER — CLINICAL SUPPORT (OUTPATIENT)
Dept: SPEECH THERAPY | Facility: HOSPITAL | Age: 69
End: 2019-10-31
Payer: MEDICARE

## 2019-10-31 ENCOUNTER — HOSPITAL ENCOUNTER (OUTPATIENT)
Dept: RADIOLOGY | Facility: HOSPITAL | Age: 69
Discharge: HOME | End: 2019-10-31
Attending: NURSE PRACTITIONER
Payer: MEDICARE

## 2019-10-31 DIAGNOSIS — J18.9 RECURRENT PNEUMONIA: ICD-10-CM

## 2019-10-31 DIAGNOSIS — K21.9 GASTROESOPHAGEAL REFLUX DISEASE, ESOPHAGITIS PRESENCE NOT SPECIFIED: ICD-10-CM

## 2019-10-31 PROCEDURE — A9698 NON-RAD CONTRAST MATERIALNOC: HCPCS | Performed by: NURSE PRACTITIONER

## 2019-10-31 PROCEDURE — 74230 X-RAY XM SWLNG FUNCJ C+: CPT | Mod: 26,,, | Performed by: RADIOLOGY

## 2019-10-31 PROCEDURE — 92611 MOTION FLUOROSCOPY/SWALLOW: CPT | Mod: GN

## 2019-10-31 PROCEDURE — 25500020 PHARM REV CODE 255: Performed by: NURSE PRACTITIONER

## 2019-10-31 PROCEDURE — 74230 FL MODIFIED BARIUM SWALLOW SPEECH STUDY: ICD-10-PCS | Mod: 26,,, | Performed by: RADIOLOGY

## 2019-10-31 PROCEDURE — 74230 X-RAY XM SWLNG FUNCJ C+: CPT | Mod: TC

## 2019-10-31 RX ADMIN — BARIUM SULFATE 5 G: 960 POWDER, FOR SUSPENSION ORAL at 02:10

## 2019-10-31 NOTE — Clinical Note
Hi Hope, I completed a MBSS for this patient yesterday. She has a PMH of PNA, scleroderma, and esophageal issues. She had silent penetration with cup sips of thin liquids that cleared spontaneously. She also has decreased tongue base retraction and pharyngeal contraction resulting in vallecular and pyriform residue. In addition, PE segment opening is mildly reduced. I think she will benefit from pharyngeal and base of tongue strengthening exercises to increase swallowing efficiency. Please let me know if you have any questions. Thanks!

## 2019-10-31 NOTE — PROGRESS NOTES
Referring provider: Natalie Dominguez  Reason for visit:  Modified barium swallow study (CPT 69246)    Report Summary   --Date: 10/31/2019  --Purpose: to evaluate anatomy and physiology of the oropharyngeal swallow, to determine effectiveness of rehabilitation strategies, and to determine diet consistency and intervention recommendations.   --Diet recommendations: regular as tolerated, thin liquids.     Subjective / History    Yamel Shah is a 68 y.o. female referred by Natalie Dominguez for Modified Barium Swallow Study (96099).  She is currently on a regular diet with thin liquids.  Patient reports a  history of difficulty swallowing heavier food such as meat; she describes feeling that these foods go down slow and that she needs to drink water when eating. She also reports coughing when swallowing large pills and liquid.   She describes having lots of reflux at night and in the morning. She takes pepcid for reflux symptoms. She was recently diagnosed with pneumonia and told that the cause was reflux related.     Diet modification as a result of this problem: no.    Weight loss: 20 # in 1 year.  History of aspiration pneumonia: Yes, patient with h/o recent pneumonia.     Past Medical History:   Diagnosis Date    Abnormal Pap smear     Acid reflux     Allergy     Arthritis     GERD (gastroesophageal reflux disease)     History of migraine headaches     Hypertension     Idiopathic neuropathy 7/20/2012    ILD (interstitial lung disease) 11/6/2013    Iron deficiency anemia 3/18/2014    MRSA carrier     Osteopenia     Pulmonary fibrosis     Pulmonary hypertension     Raynaud's disease     Scleroderma, diffuse     Sjogren's syndrome     Vitamin D deficiency 11/14/2013     Current Outpatient Medications on File Prior to Visit   Medication Sig Dispense Refill    albuterol (VENTOLIN HFA) 90 mcg/actuation inhaler Inhale 2 puffs into the lungs every 4 (four) hours as needed for Wheezing. Rescue 18 g 1     azithromycin (Z-IMELDA) 250 MG tablet       b complex vitamins tablet Take 1 tablet by mouth once daily.      carboxymethylcellulose sodium (REFRESH TEARS) 0.5 % Drop Apply 1-2 drops to every as needed      doxycycline (VIBRAMYCIN) 100 MG Cap       ergocalciferol (ERGOCALCIFEROL) 50,000 unit Cap Take 1 capsule (50,000 Units total) by mouth every 7 days. 12 capsule 1    famotidine (PEPCID) 20 MG tablet Take 1 tablet (20 mg total) by mouth 2 (two) times daily. 60 tablet 11    ferrous sulfate 325 (65 FE) MG EC tablet Take 1 tablet (325 mg total) by mouth 3 (three) times daily. (Patient taking differently: Take 325 mg by mouth once daily. )  0    flu vacc [id number],65yr up,PF (FLUZONE HIGH-DOSE 2019-20, PF,) 180 mcg/0.5 mL Syrg INJECT INTO THE MUSCLE. 0.5 mL 0    fluoride, sodium, (PREVIDENT 5000) 1.1 % Pste       guaiFENesin (MUCINEX) 600 mg 12 hr tablet Take 2 tablets in the morning with a large glass of water 60 tablet 1    guaifenesin-codeine 100-10 mg/5 ml (TUSSI-ORGANIDIN NR)  mg/5 mL syrup Take 5-10 mLs by mouth every evening. 120 mL 0    montelukast (SINGULAIR) 10 mg tablet Take 1 tablet (10 mg total) by mouth every evening. 30 tablet 0    multivitamin capsule Take 1 capsule by mouth once daily.       mycophenolate mofetil (CELLCEPT) 200 mg/mL SusR Take 5 mLs (1,000 mg total) by mouth 2 (two) times daily. (Patient not taking: Reported on 10/25/2019) 480 mL 1    NIFEdipine (ADALAT CC) 90 MG TbSR TAKE 1 TABLET(90 MG) BY MOUTH EVERY DAY 90 tablet 3    omeprazole (PRILOSEC) 40 MG capsule Take 1 capsule (40 mg total) by mouth 2 (two) times daily before meals. 180 capsule 3    pravastatin (PRAVACHOL) 20 MG tablet TAKE 1 TABLET BY MOUTH EVERY EVENING 90 tablet 0    pregabalin (LYRICA) 150 MG capsule Take 1 capsule (150 mg total) by mouth 2 (two) times daily. 60 capsule 2    spironolactone (ALDACTONE) 25 MG tablet Take 1 tablet (25 mg total) by mouth once daily. 30 tablet 11    tadalafil (ADCIRCA)  20 mg Tab Take 2 tablets (40 mg total) by mouth once daily. 180 tablet 3     No current facility-administered medications on file prior to visit.        Objective   Lateral videofluorographic views were obtained. The radiologist and speech pathologist were present for the entire procedure.     Consistencies assessed / method of administration  Thin liquid/cup sip  Thin liquid/sequential cup sips  Applesauce/tsp  Pudding/tsp  Cracker  16 Fr capsule w/ water    Oral phase  - Adequate lip closure.  - Adequate tongue control during bolus hold.  - Adequate bolus preparation.  - Reduced bolus transport/lingual motion across all consistencies, resulting in mild residue on posterior lingual surface.   - Mild oral residue with thin liquid consistencies. Moderate oral residue with piecemeal deglutition demonstrated with apple sauce, pudding, and cracker consistency.     Pharyngeal phase  - Delayed swallow initiation to the pyriform sinuses across all consistencies.   - Adequate soft palate elevation.  - Slightly reduced laryngeal elevation and severely reduced anterior hyoid excursion.  - Mild to moderately reduced tongue base retraction across all consistencies.   - Incomplete epiglottic movement across all consistencies.   - Incomplete laryngeal vestibular closure with cup sips and sequential sips of thin liquid, resulting in penetration that was spontaneously cleared.   - Mild to moderately reduced pharyngeal stripping wave across all consistencies. Moderately reduced pharyngeal stripping wave with 16 Fr pill, resulting in brief pyriform stasis. Pill cleared through the UES; however, patient began coughing and reported that the pill felt stuck.   - Moderate vallecular and moderate to severe pyriform residue with cup sips of thin liquid consistencies, which increased with larger sips and sequential sips. Moderate vallecular and mild to moderate pyriform sinus residue with applesauce consistency. Moderate to severe  vallecular residue with pudding consistency. With pudding consistency, patient volitionally brought up residue in valleculae to her oral cavity and swallowed remainder of bolus.   - Mildly reduced extent of PE segment opening across all consistencies, which worsened with larger volumes.     Strategies/therapeutic interventions attempted  - Ongoing treatment recommendations and rationale (if treatment is recommended): Target base of tongue as well as pharyngeal strengthening exercises.  -Alternate solids with liquid wash was effective in reducing vallecular and pyriform residue.  -Multiple swallows per bolus was effective in clearing vallecular and pyriform residue.  -Effortful swallow was mildly effective in reducing vallecular and pyriform residue.     Rosenbek Penetration-Aspiration Scale (Rosenbek et al 1996)  Best Trial: 1. Contrast does not enter airway.   Worst Trial: 2. Contrast enters airway but remains above TVF, no residue.     Functional goals  Length Status Goal   Long term Initiated  Patient will maintain adequate hydration/nutrition with optimum safety and efficiency of swallowing function on least restrictive PO diet level without overt signs and symptoms of aspiration.    Long term Initiated  Patient will utilize compensatory strategies with optimum safety and efficiency of swallowing function on least restrictive PO diet level without overt signs and symptoms of aspiration.    Short term Initiated  Patient will correctly demonstrate pharyngeal swallow strengthening exercises on 10/10 trials.   Short term Initiated  Patient will independently alternate liquids and solids as well as take multiple swallows per bolus to clear any oral cavity and pharyngeal residue on 8/10 trials.       Assessment / Impressions   The results of this MBSS indicate that patient demonstrates moderate swallowing dysfunction c/b intermittent silent penetration of thin liquids as well as moderate pharyngeal residue across  consistencies. No aspiration observed. Given patient's esophageal symptoms and her history of scleroderma, prognosis for improvement of swallowing with therapy is good/ fair.      Recommendations / POC   -Diet: recommend thin liquids and regular as tolerated.   -Therapeutic technique: recommend small bites/sips, alternate solid with liquid wash and multiple swallows per bolus.  -Dysphagia therapy, for 4-6 sessions over the course of 4-6 weeks, in order to increase tongue base retraction, increase pharyngeal contraction and increase swallow safety by implementing therapeutic maneuvers.   -Repeat MBSS in 6-8 weeks for reeval after completion of dysphagia therapy.  -Contact clinician or referring provider for repeat MBSS if swallowing status changes or worsens.  -Recommend follow-up with GI.

## 2019-11-01 ENCOUNTER — TELEPHONE (OUTPATIENT)
Dept: PULMONOLOGY | Facility: CLINIC | Age: 69
End: 2019-11-01

## 2019-11-01 ENCOUNTER — TELEPHONE (OUTPATIENT)
Dept: GASTROENTEROLOGY | Facility: CLINIC | Age: 69
End: 2019-11-01

## 2019-11-01 NOTE — TELEPHONE ENCOUNTER
Received fax from [facility] that indicated PA needed for famotidine 20 mg. Attempted to contact insurance to initiate PA. Insurance rep stated that this rx requires no PA and that pt needs to contact them reg coverage options.

## 2019-11-01 NOTE — PROGRESS NOTES
"Digital Medicine: Health  Follow-Up    Patient reports she is well, no complaints.     The history is provided by the patient.     Follow Up  Follow-up reason(s): reading review and routine education          INTERVENTION(S)  recommended diet modifications, recommend physical activity, encouragement/support, denied further coaching, denied resources and denied questions    PLAN  patient verbalizes understanding, patient amenable to changes and continue monitoring      There are no preventive care reminders to display for this patient.    Last 5 Patient Entered Readings                                      Current 30 Day Average: 133/65     Recent Readings 10/31/2019 10/30/2019 10/29/2019 10/28/2019 10/26/2019    SBP (mmHg) 135 140 138 135 142    DBP (mmHg) 61 69 73 65 65    Pulse 81 80 80 85 70                      Diet Screening   No change to diet.  Patient reports eating or drinking the following: fast food, soda, water, fresh vegetables and desserts/sweetsShe has the following dietary restrictions: low sodium diet    The patient states that she typically eats a non-home cooked meal (ex: dining out, take out, frozen meals) 1-2 times per week.  She cooks for self.    Patient does the shopping for groceries.  She gets groceries from the grocery store.      She does not find cooking difficult.      Reports she only drinks soda only twice per month now, assessed goal in chart. Reports eating fast food "every now and then".    Reports she is making efforts to cut back on sweets and chips. Reports she gives it to her  to take to [employer].     Physical Activity Screening   When asked if exercising, patient responded: yes    Patient participates in the following activities: walking    Reports she is trying to walk more, particularly in [facility].     Walks up and down the stairs at home.     Medication Adherence Screening   She misses doses: never        SDOH  "

## 2019-11-01 NOTE — TELEPHONE ENCOUNTER
Pt. Called and informed that pulmonary rehab is not covered by her insurance for her diagnosis, instructed pt that Dr. Leigh recommended possibly finding pulmonary rehab videos online to exercise with, pt verbalized understanding

## 2019-11-12 ENCOUNTER — OFFICE VISIT (OUTPATIENT)
Dept: PRIMARY CARE CLINIC | Facility: CLINIC | Age: 69
End: 2019-11-12
Payer: MEDICARE

## 2019-11-12 ENCOUNTER — HOSPITAL ENCOUNTER (OUTPATIENT)
Dept: RADIOLOGY | Facility: HOSPITAL | Age: 69
Discharge: HOME | End: 2019-11-12
Attending: NURSE PRACTITIONER
Payer: MEDICARE

## 2019-11-12 VITALS
OXYGEN SATURATION: 99 % | BODY MASS INDEX: 26.55 KG/M2 | SYSTOLIC BLOOD PRESSURE: 122 MMHG | HEIGHT: 65 IN | HEART RATE: 71 BPM | TEMPERATURE: 98 F | WEIGHT: 159.38 LBS | DIASTOLIC BLOOD PRESSURE: 60 MMHG

## 2019-11-12 DIAGNOSIS — D84.821 IMMUNOSUPPRESSION DUE TO DRUG THERAPY: ICD-10-CM

## 2019-11-12 DIAGNOSIS — J18.9 PNEUMONIA OF LEFT LOWER LOBE DUE TO INFECTIOUS ORGANISM: ICD-10-CM

## 2019-11-12 DIAGNOSIS — J18.9 PNEUMONIA OF LEFT LOWER LOBE DUE TO INFECTIOUS ORGANISM: Primary | ICD-10-CM

## 2019-11-12 DIAGNOSIS — Z79.899 IMMUNOSUPPRESSION DUE TO DRUG THERAPY: ICD-10-CM

## 2019-11-12 PROBLEM — E46 PROTEIN-CALORIE MALNUTRITION: Status: RESOLVED | Noted: 2019-05-19 | Resolved: 2019-11-12

## 2019-11-12 PROCEDURE — 71046 X-RAY EXAM CHEST 2 VIEWS: CPT | Mod: 26,,, | Performed by: RADIOLOGY

## 2019-11-12 PROCEDURE — 99214 OFFICE O/P EST MOD 30 MIN: CPT | Mod: S$PBB,,, | Performed by: NURSE PRACTITIONER

## 2019-11-12 PROCEDURE — 99999 PR PBB SHADOW E&M-EST. PATIENT-LVL V: ICD-10-PCS | Mod: PBBFAC,,, | Performed by: NURSE PRACTITIONER

## 2019-11-12 PROCEDURE — 99214 PR OFFICE/OUTPT VISIT, EST, LEVL IV, 30-39 MIN: ICD-10-PCS | Mod: S$PBB,,, | Performed by: NURSE PRACTITIONER

## 2019-11-12 PROCEDURE — 71046 X-RAY EXAM CHEST 2 VIEWS: CPT | Mod: TC,PN

## 2019-11-12 PROCEDURE — 99215 OFFICE O/P EST HI 40 MIN: CPT | Mod: PBBFAC,25,PN | Performed by: NURSE PRACTITIONER

## 2019-11-12 PROCEDURE — 96372 THER/PROPH/DIAG INJ SC/IM: CPT | Mod: PBBFAC,PN

## 2019-11-12 PROCEDURE — 99999 PR PBB SHADOW E&M-EST. PATIENT-LVL V: CPT | Mod: PBBFAC,,, | Performed by: NURSE PRACTITIONER

## 2019-11-12 PROCEDURE — 71046 XR CHEST PA AND LATERAL: ICD-10-PCS | Mod: 26,,, | Performed by: RADIOLOGY

## 2019-11-12 RX ORDER — DOXYCYCLINE HYCLATE 100 MG
100 TABLET ORAL 2 TIMES DAILY
Qty: 20 TABLET | Refills: 0 | Status: SHIPPED | OUTPATIENT
Start: 2019-11-12 | End: 2019-11-12

## 2019-11-12 RX ORDER — CODEINE PHOSPHATE AND GUAIFENESIN 10; 100 MG/5ML; MG/5ML
5-10 SOLUTION ORAL NIGHTLY
Qty: 120 ML | Refills: 0 | Status: SHIPPED | OUTPATIENT
Start: 2019-11-12 | End: 2019-12-08 | Stop reason: SDUPTHER

## 2019-11-12 RX ORDER — LIDOCAINE HYDROCHLORIDE 10 MG/ML
1 INJECTION INFILTRATION; PERINEURAL
Status: COMPLETED | OUTPATIENT
Start: 2019-11-12 | End: 2019-11-12

## 2019-11-12 RX ORDER — DOXYCYCLINE HYCLATE 100 MG
100 TABLET ORAL 2 TIMES DAILY
Qty: 20 TABLET | Refills: 0 | Status: SHIPPED | OUTPATIENT
Start: 2019-11-12 | End: 2019-11-22

## 2019-11-12 RX ORDER — CEFTRIAXONE 500 MG/1
500 INJECTION, POWDER, FOR SOLUTION INTRAMUSCULAR; INTRAVENOUS
Status: COMPLETED | OUTPATIENT
Start: 2019-11-12 | End: 2019-11-12

## 2019-11-12 RX ADMIN — CEFTRIAXONE SODIUM 500 MG: 500 INJECTION, POWDER, FOR SOLUTION INTRAMUSCULAR; INTRAVENOUS at 11:11

## 2019-11-12 RX ADMIN — LIDOCAINE HYDROCHLORIDE 1 ML: 10 INJECTION INFILTRATION; PERINEURAL at 11:11

## 2019-11-12 NOTE — PROGRESS NOTES
Subjective:       Patient ID: Yamel Shah is a 68 y.o. female.    Chief Complaint: Sore Throat; Cough; Hoarse; and Headache    Ms Shah presents today for cough, body aches, headaches, sore throat and fatigue that began Sunday and has worsened. The cough is productive of yellowish mucus. She is immunosuppressed with Cellcept for scleroderma. She has had 2 episodes of pneumonia since the Cellcept dose was increased. She does find that the higher dose is very helpful, when she is not ill.       Review of Systems   Constitutional: Positive for chills and fatigue. Negative for fever.   HENT: Positive for sore throat. Negative for facial swelling.    Respiratory: Positive for cough. Negative for shortness of breath.    Cardiovascular: Negative for chest pain.   Gastrointestinal: Negative for diarrhea, nausea and vomiting.   Genitourinary: Negative for dysuria.   Musculoskeletal: Negative for gait problem.   Skin: Negative for rash.   Neurological: Positive for headaches.   Psychiatric/Behavioral: Negative for confusion.       Objective:      Physical Exam   Constitutional: She is oriented to person, place, and time. She appears well-developed and well-nourished. She appears ill. No distress.   HENT:   Mouth/Throat: No oropharyngeal exudate.   Eyes: No scleral icterus.   Cardiovascular: Normal rate, regular rhythm and normal heart sounds.   Pulmonary/Chest: No tachypnea. No respiratory distress. She has rales in the left lower field.   Neurological: She is alert and oriented to person, place, and time.   Skin: Skin is warm and dry. She is not diaphoretic.   Psychiatric: She has a normal mood and affect. Her behavior is normal.   Nursing note and vitals reviewed.      Assessment:       1. Pneumonia of left lower lobe due to infectious organism    2. Immunosuppression due to drug therapy    3. Recurrent pneumonia        Plan:   1. Pneumonia of left lower lobe due to infectious organism  - X-Ray Chest PA And  Lateral; Future  - cefTRIAXone injection 500 mg  - doxycycline (VIBRA-TABS) 100 MG tablet; Take 1 tablet (100 mg total) by mouth 2 (two) times daily. for 10 days  Dispense: 20 tablet; Refill: 0  - guaifenesin-codeine 100-10 mg/5 ml (TUSSI-ORGANIDIN NR)  mg/5 mL syrup; Take 5-10 mLs by mouth every evening.  Dispense: 120 mL; Refill: 0  - lidocaine HCL 10 mg/ml (1%) injection 1 mL    2. Immunosuppression due to drug therapy  - doxycycline (VIBRA-TABS) 100 MG tablet; Take 1 tablet (100 mg total) by mouth 2 (two) times daily. for 10 days  Dispense: 20 tablet; Refill: 0      Pt has been given instructions populated from [employer] database and has verbalized understanding of the after visit summary and information contained wherein.    Follow up with a primary care provider. May go to ER for acute shortness of breath, lightheadedness, fever, or any other emergent complaints or changes in condition.

## 2019-11-12 NOTE — PATIENT INSTRUCTIONS
- Hold mycophenolate (cellcept) until you have taken the entire course of antibiotics, then restart it.

## 2019-11-12 NOTE — Clinical Note
Hi Dr. Ahuja, I advised Ms. Shah to hold mycophenolate until she completes the course of doxycycline, based on the recommendations you made in early October. If you do not agree with this, please let me know so I can advise her differently. Thank you. Natalie

## 2019-11-14 ENCOUNTER — PATIENT OUTREACH (OUTPATIENT)
Dept: OTHER | Facility: OTHER | Age: 69
End: 2019-11-14

## 2019-11-15 ENCOUNTER — LAB VISIT (OUTPATIENT)
Dept: LAB | Facility: HOSPITAL | Age: 69
End: 2019-11-15
Attending: INTERNAL MEDICINE
Payer: MEDICARE

## 2019-11-15 DIAGNOSIS — I27.29 PULMONARY HYPERTENSION ASSOCIATED WITH SYSTEMIC DISORDER: Chronic | ICD-10-CM

## 2019-11-15 DIAGNOSIS — M34.9 SCLERODERMA: ICD-10-CM

## 2019-11-15 LAB
ALBUMIN SERPL BCP-MCNC: 3.7 G/DL (ref 3.5–5.2)
ALP SERPL-CCNC: 116 U/L (ref 55–135)
ALT SERPL W/O P-5'-P-CCNC: 10 U/L (ref 10–44)
ANION GAP SERPL CALC-SCNC: 8 MMOL/L (ref 8–16)
AST SERPL-CCNC: 17 U/L (ref 10–40)
BASOPHILS # BLD AUTO: 0.02 K/UL (ref 0–0.2)
BASOPHILS NFR BLD: 0.5 % (ref 0–1.9)
BILIRUB SERPL-MCNC: 0.4 MG/DL (ref 0.1–1)
BUN SERPL-MCNC: 18 MG/DL (ref 8–23)
CALCIUM SERPL-MCNC: 9.2 MG/DL (ref 8.7–10.5)
CHLORIDE SERPL-SCNC: 108 MMOL/L (ref 95–110)
CO2 SERPL-SCNC: 24 MMOL/L (ref 23–29)
CREAT SERPL-MCNC: 0.7 MG/DL (ref 0.5–1.4)
CRP SERPL-MCNC: 9.7 MG/L (ref 0–8.2)
DIFFERENTIAL METHOD: ABNORMAL
EOSINOPHIL # BLD AUTO: 0.1 K/UL (ref 0–0.5)
EOSINOPHIL NFR BLD: 1.3 % (ref 0–8)
ERYTHROCYTE [DISTWIDTH] IN BLOOD BY AUTOMATED COUNT: 14.4 % (ref 11.5–14.5)
ERYTHROCYTE [SEDIMENTATION RATE] IN BLOOD BY WESTERGREN METHOD: 62 MM/HR (ref 0–36)
EST. GFR  (AFRICAN AMERICAN): >60 ML/MIN/1.73 M^2
EST. GFR  (NON AFRICAN AMERICAN): >60 ML/MIN/1.73 M^2
GLUCOSE SERPL-MCNC: 111 MG/DL (ref 70–110)
HCT VFR BLD AUTO: 41.3 % (ref 37–48.5)
HGB BLD-MCNC: 13.1 G/DL (ref 12–16)
IMM GRANULOCYTES # BLD AUTO: 0.01 K/UL (ref 0–0.04)
IMM GRANULOCYTES NFR BLD AUTO: 0.3 % (ref 0–0.5)
LYMPHOCYTES # BLD AUTO: 1.5 K/UL (ref 1–4.8)
LYMPHOCYTES NFR BLD: 38.7 % (ref 18–48)
MCH RBC QN AUTO: 30 PG (ref 27–31)
MCHC RBC AUTO-ENTMCNC: 31.7 G/DL (ref 32–36)
MCV RBC AUTO: 95 FL (ref 82–98)
MONOCYTES # BLD AUTO: 0.3 K/UL (ref 0.3–1)
MONOCYTES NFR BLD: 8.4 % (ref 4–15)
NEUTROPHILS # BLD AUTO: 1.9 K/UL (ref 1.8–7.7)
NEUTROPHILS NFR BLD: 50.8 % (ref 38–73)
NRBC BLD-RTO: 0 /100 WBC
PLATELET # BLD AUTO: 207 K/UL (ref 150–350)
PMV BLD AUTO: 11.3 FL (ref 9.2–12.9)
POTASSIUM SERPL-SCNC: 4.1 MMOL/L (ref 3.5–5.1)
PROT SERPL-MCNC: 8.6 G/DL (ref 6–8.4)
RBC # BLD AUTO: 4.37 M/UL (ref 4–5.4)
SODIUM SERPL-SCNC: 140 MMOL/L (ref 136–145)
WBC # BLD AUTO: 3.8 K/UL (ref 3.9–12.7)

## 2019-11-15 PROCEDURE — 36415 COLL VENOUS BLD VENIPUNCTURE: CPT

## 2019-11-15 PROCEDURE — 85652 RBC SED RATE AUTOMATED: CPT

## 2019-11-15 PROCEDURE — 86140 C-REACTIVE PROTEIN: CPT

## 2019-11-15 PROCEDURE — 85025 COMPLETE CBC W/AUTO DIFF WBC: CPT

## 2019-11-15 PROCEDURE — 80053 COMPREHEN METABOLIC PANEL: CPT

## 2019-11-16 ENCOUNTER — TELEPHONE (OUTPATIENT)
Dept: RHEUMATOLOGY | Facility: CLINIC | Age: 69
End: 2019-11-16

## 2019-11-16 DIAGNOSIS — R70.0 SEDIMENTATION RATE ELEVATION: Primary | ICD-10-CM

## 2019-11-18 ENCOUNTER — CLINICAL SUPPORT (OUTPATIENT)
Dept: REHABILITATION | Facility: HOSPITAL | Age: 69
End: 2019-11-18
Payer: MEDICARE

## 2019-11-18 DIAGNOSIS — R13.14 PHARYNGOESOPHAGEAL DYSPHAGIA: Primary | ICD-10-CM

## 2019-11-18 PROCEDURE — 92610 EVALUATE SWALLOWING FUNCTION: CPT | Mod: PO

## 2019-11-18 PROCEDURE — 92526 ORAL FUNCTION THERAPY: CPT | Mod: PO

## 2019-11-18 NOTE — PROGRESS NOTES
Chart reviewed. Current average is still above goal however, readings are trending down appropriately. Call pushed to provide additional monitoring.     Last 5 Patient Entered Readings                                      Current 30 Day Average: 139/70     Recent Readings 11/12/2019 11/11/2019 11/9/2019 11/8/2019 11/7/2019    SBP (mmHg) 132 137 127 134 148    DBP (mmHg) 68 68 66 60 79    Pulse 96 90 72 77 74          Hypertension Medications             NIFEdipine (ADALAT CC) 90 MG TbSR TAKE 1 TABLET(90 MG) BY MOUTH EVERY DAY    spironolactone (ALDACTONE) 25 MG tablet Take 1 tablet (25 mg total) by mouth once daily.

## 2019-11-18 NOTE — PLAN OF CARE
"Outpatient Neurological Rehabilitation  Speech and Language Therapy Evaluation    Date: 11/18/2019     Name: Yamel Shah   MRN: [id number]    Therapy Diagnosis:   Encounter Diagnosis   Name Primary?    Pharyngoesophageal dysphagia Yes    Physician: Natalie Dominguez NP  Physician Orders: Ambulatory Consult to Speech Therapy; Concern for possible aspiration  Medical Diagnosis: Recurrent pneumonia [J18.9]  Gastroesophageal reflux disease, esophagitis presence not specified [K21.9]    Visit # / Visits Authorized:  1 / 20   Date of Evaluation:  11/18/2019   Insurance Authorization Period: 10/2/19 to 12/31/19  Plan of Care Certification:    11/18/2019 to 1/3/19      Time In: 0945   Time Out: 1030   Total Billable Time: 45 minutes   Procedure Min.   Swallow and Oral Function Evaluation   25   Dysphagia Therapy  20     Precautions:Standard and aspiration  Subjective   Date of Onset: fall 2019 for most recent onset  History of Current Condition:   Pt reports " I think the reason they referred me here is because I was having swallowing problems." reports that she is having difficulty with meats or anything big, gets suck mid esophagus and goes slowly down. Any food that is bulky has a problem going down. She recently had pneumonia from acid reflux per her own report. Yamel has a history of scleroderma and has had swallowing problems in the remote past.    Past Medical History: Yamel Shah  has a past medical history of Abnormal Pap smear, Acid reflux, Allergy, Arthritis, GERD (gastroesophageal reflux disease), History of migraine headaches, Hypertension, Idiopathic neuropathy (7/20/2012), ILD (interstitial lung disease) (11/6/2013), Iron deficiency anemia (3/18/2014), MRSA carrier, Osteopenia, Pulmonary fibrosis, Pulmonary hypertension, Raynaud's disease, Scleroderma, diffuse, Sjogren's syndrome, and Vitamin D deficiency (11/14/2013).  Yamel Shah  has a past surgical history that includes " "Dilation and curettage of uterus; Breast biopsy; Cervical conization w/ laser (1970); Hysterectomy (1990); Esophagogastroduodenoscopy; Colonoscopy; Esophagogastroduodenoscopy (N/A, 3/29/2019); and Colonoscopy (N/A, 3/29/2019).  Medical Hx and Allergies: Yamel has a current medication list which includes the following prescription(s): albuterol, b complex vitamins, carboxymethylcellulose sodium, conjugated estrogens, doxycycline, ergocalciferol, famotidine, ferrous sulfate, fluoride (sodium), guaifenesin-codeine 100-10 mg/5 ml, multivitamin, mycophenolate mofetil, nifedipine, omeprazole, pravastatin, pregabalin, spironolactone, and tadalafil.   Review of patient's allergies indicates:   Allergen Reactions    Sulfa (sulfonamide antibiotics)      Other reaction(s): Rash     Prior Therapy:  MBSS 10/31/19  Social History:  Pt eats a variety of foods and textures.   prior Level of Function: minimal to no difficulty swallowing    Current Level of Function: globus sensation with solids; GERD is reportedly poorly managed  Pain:   0/10  Pain Location / Description: n/a  Nutrition:  Regular diet, thin liquids  Patient's Therapy Goals:  To have the stuck feeling in her esophagus go away.  Objective   Formal Assessment:  MBSS Results:  "- Delayed swallow initiation to the pyriform sinuses across all consistencies.   - Adequate soft palate elevation.  - Slightly reduced laryngeal elevation and severely reduced anterior hyoid excursion.  - Mild to moderately reduced tongue base retraction across all consistencies.   - Incomplete epiglottic movement across all consistencies.   - Incomplete laryngeal vestibular closure with cup sips and sequential sips of thin liquid, resulting in penetration that was spontaneously cleared.   - Mild to moderately reduced pharyngeal stripping wave across all consistencies. Moderately reduced pharyngeal stripping wave with 16 Fr pill, resulting in brief pyriform stasis. Pill cleared through the " "UES; however, patient began coughing and reported that the pill felt stuck.   - Moderate vallecular and moderate to severe pyriform residue with cup sips of thin liquid consistencies, which increased with larger sips and sequential sips. Moderate vallecular and mild to moderate pyriform sinus residue with applesauce consistency. Moderate to severe vallecular residue with pudding consistency. With pudding consistency, patient volitionally brought up residue in valleculae to her oral cavity and swallowed remainder of bolus.   - Mildly reduced extent of PE segment opening across all consistencies, which worsened with larger volumes."    Clinical Swallow Exam/ Stacie Mechanism Exam:  Oral Motor Exam:  Mandibular Strength and Mobility - Trigeminal Nerve (CNV) Rest: Range of Motion WFL for oral intake    Lateralization: Range of Motion WFL for oral intake   Protrusion: Range of Motion WFL for oral intake   Retraction:  Range of Motion WFL for oral intake   Involuntary Movement: absent    Oral Labial Strength and Mobility -Facial Nerve (CN VII)  Rest: Range of Motion WFL for oral intake    Lateralization: Range of Motion WFL for oral intake   Protrusion: Range of Motion WFL for oral intake   Retraction:  Range of Motion WFL for oral intake   Involuntary Movement: absent    Lingual Strength and Mobility- Hypoglossal Nerve (CN XII)  Rest: Range of Motion WFL for oral intake    Lateralization: Range of Motion WFL for oral intake   Protrusion: slight deviation to left on protrusion   Retraction:  Range of Motion WFL for oral intake   Involuntary Movement: absent    Velar Elevation - Glossopharyngeal Nerve (CN IX) and Vagus (CN X) Rest: symmetrical   Symmetry with Short Production of "ah": symmetrical  Maximum Phonation Time: did not test   Involuntary Movement: absent    Buccal Strength and Mobility - Facial Nerve (CN VII)  WFL **   Facial Sensation and Movement - Facial Nerve (CN VII) Symmetrical at rest: yes  Wrinkle forehead: " yes  Able to close eyes tightly: yes  Taste to Anterior 2/3 of Tongue: yes     Structure Abnormalities: yes - small jaw- unclear if diagnosed with micrognathia in the remote past  Dentition: missing bottom molars bilaterally  Secretion Management: Range of Motion WFL for oral intake   Mucosal Quality: good    S/z ratio: 1.25    Oxford Swallow Protocol:  PASS   Oxford Swallow Protocol dictates pt remain NPO if fail screener; (Sergo et al. 2014) however, as objective swallow assessment is not available for greater than a week, pt will remain on current diet until objective assessment is completed unless otherwise indicated.     Clinical Swallow Examination:   Pt presented with:   THIN:-3 oz water test and self regulated thin liquid via cup    PUREE:- tsp bites of pudding x2    DENTAL SOFT:- tsp bites of peaches x2    SOLID: -bite of nancy cracker x1     DESCRIPTION: oral phase: oral phase of swallow was WFL. Adequate seal around cup and spoon with trace buccal residue.  Pharyngeal Phase: pharyngeal deficits likely due to history of esophageal dysphagia. No over s/sx of aspiration today on clinical swallow exam but symptoms are well documented on MBSS.      [place] National Outcome Measures System (NOMS):   NOMS Swallowing  LEVEL 5: Swallowing is safe with minimal diet restriction and/or occasionally requires minimal cueing to use compensatory strategies. The individual may occasionally self-cue. All nutrition and hydration needs are met by mouth at mealtime     Treatment   Treatment Time In: 1010  Treatment Time Out: 1030  Total Treatment Time: 20 minutes  effortful swallow reviewed in detail. max cues required to perform effortful swallow. pt perfromed effortful swallow x10 with those cues.    Education: {Plan of Care, role of SLP in care, aspiration precautions , GERD precautions and results of MBSS were discussed with pt.  Extensive education regarding reflux management was reviewed. Swallow physiology was reviewed  Patient expressed understanding.     Home Program: complete effortful swallow 10x each, 2-3 times per day  Assessment     Yamel presents to [facility] s/p medical diagnosis of  GERD and dysphagia.  Demonstrates impairments including limitations as described in the problem list.She presents with pharyngoesophageal dysphagia c/b reduced hyolaryngeal elevation / excursion, reduced tongue base retraction, reduced laryngeal vestibule closure, reduced pharyngeal stripping wave, reduced PE segment opening.  Positive prognostic factors include patient motivation. Negative prognostic factors include history of scleroderma which often results in dysphagia .No barriers to therapy identified. Patient will benefit from skilled, outpatient neurological rehabilitation speech therapy.    Rehab Potential: fair  Pt's spiritual, cultural and educational needs considered and pt agreeable to plan of care and goals.    Short Term Goals (3 weeks):   1. Pt will complete effortful swallow 60-90x per session to improve pharyngeal stripping wave and tongue base retraction.  2. Pt willcomplete Chin Tuck Against Resistance (CTAR) hold for 45 seconds immediately followed by 45 repetitions to improve hyolaryngeal elevation and excursion and PE segment opening.   3. Pt will complete supraglottic swallow 30-45x per session to improve laryngeal vestibule closure.  4. Pt will complete mendelsohn maneuver 30-45x per session to improve hyolaryneal elevation / excursion.     Long Term Goals (6 weeks):   1.  She  will maintain adequate hydration/nutrition with optimum safety and efficiency of swallowing function on PO intake without overt signs and symptoms of aspiration for regular diet level.     2. She  will utilize compensatory strategies with optimum safety and efficiency of swallowing function on PO intake without overt signs and symptoms of aspiration for regular diet level.      Plan     Plan of Care Certification  Period: 11/18/2019  to 1/3/19    Recommended Treatment Plan:  Patient will participate in the [facility] neurological rehabilitation program for speech therapy 2 times per week to address her  Swallow deficits, to educate patient and their family, and to participate in a home exercise program.    Other Recommendations: none at this time     Therapist's Name:   CARMEN Fierro, CCC-SLP   11/18/2019     Physician Name: _______________________________    Physician Signature: ____________________________

## 2019-11-19 ENCOUNTER — OFFICE VISIT (OUTPATIENT)
Dept: OBSTETRICS AND GYNECOLOGY | Facility: CLINIC | Age: 69
End: 2019-11-19
Payer: MEDICARE

## 2019-11-19 VITALS
HEIGHT: 65 IN | DIASTOLIC BLOOD PRESSURE: 62 MMHG | WEIGHT: 157.88 LBS | SYSTOLIC BLOOD PRESSURE: 110 MMHG | BODY MASS INDEX: 26.3 KG/M2

## 2019-11-19 DIAGNOSIS — Z01.419 WELL WOMAN EXAM WITH ROUTINE GYNECOLOGICAL EXAM: Primary | ICD-10-CM

## 2019-11-19 DIAGNOSIS — N64.4 BREAST PAIN: ICD-10-CM

## 2019-11-19 DIAGNOSIS — R32 URINARY INCONTINENCE, UNSPECIFIED TYPE: ICD-10-CM

## 2019-11-19 DIAGNOSIS — N89.8 VAGINAL DRYNESS: ICD-10-CM

## 2019-11-19 DIAGNOSIS — Z12.31 ENCOUNTER FOR SCREENING MAMMOGRAM FOR MALIGNANT NEOPLASM OF BREAST: ICD-10-CM

## 2019-11-19 LAB
BACTERIA #/AREA URNS AUTO: ABNORMAL /HPF
BILIRUB SERPL-MCNC: ABNORMAL MG/DL
BILIRUB UR QL STRIP: NEGATIVE
BLOOD URINE, POC: 1
CLARITY UR REFRACT.AUTO: ABNORMAL
COLOR UR AUTO: ABNORMAL
COLOR, POC UA: ABNORMAL
GLUCOSE UR QL STRIP: ABNORMAL
GLUCOSE UR QL STRIP: NEGATIVE
HGB UR QL STRIP: ABNORMAL
HYALINE CASTS UR QL AUTO: 0 /LPF
KETONES UR QL STRIP: ABNORMAL
KETONES UR QL STRIP: NEGATIVE
LEUKOCYTE ESTERASE UR QL STRIP: ABNORMAL
LEUKOCYTE ESTERASE URINE, POC: ABNORMAL
MICROSCOPIC COMMENT: ABNORMAL
NITRITE UR QL STRIP: NEGATIVE
NITRITE, POC UA: ABNORMAL
PH UR STRIP: 5 [PH] (ref 5–8)
PH, POC UA: ABNORMAL
PROT UR QL STRIP: ABNORMAL
PROTEIN, POC: ABNORMAL
RBC #/AREA URNS AUTO: 4 /HPF (ref 0–4)
SP GR UR STRIP: 1.02 (ref 1–1.03)
SPECIFIC GRAVITY, POC UA: ABNORMAL
SQUAMOUS #/AREA URNS AUTO: 1 /HPF
URN SPEC COLLECT METH UR: ABNORMAL
UROBILINOGEN, POC UA: 1
WBC #/AREA URNS AUTO: 0 /HPF (ref 0–5)

## 2019-11-19 PROCEDURE — G0101 CA SCREEN;PELVIC/BREAST EXAM: HCPCS | Mod: S$PBB,,, | Performed by: STUDENT IN AN ORGANIZED HEALTH CARE EDUCATION/TRAINING PROGRAM

## 2019-11-19 PROCEDURE — G0101 CA SCREEN;PELVIC/BREAST EXAM: HCPCS | Mod: PBBFAC,PN | Performed by: STUDENT IN AN ORGANIZED HEALTH CARE EDUCATION/TRAINING PROGRAM

## 2019-11-19 PROCEDURE — 99999 PR PBB SHADOW E&M-EST. PATIENT-LVL III: ICD-10-PCS | Mod: PBBFAC,,, | Performed by: STUDENT IN AN ORGANIZED HEALTH CARE EDUCATION/TRAINING PROGRAM

## 2019-11-19 PROCEDURE — 81002 URINALYSIS NONAUTO W/O SCOPE: CPT | Mod: PBBFAC,PN | Performed by: STUDENT IN AN ORGANIZED HEALTH CARE EDUCATION/TRAINING PROGRAM

## 2019-11-19 PROCEDURE — 99999 PR PBB SHADOW E&M-EST. PATIENT-LVL III: CPT | Mod: PBBFAC,,, | Performed by: STUDENT IN AN ORGANIZED HEALTH CARE EDUCATION/TRAINING PROGRAM

## 2019-11-19 PROCEDURE — 81001 URINALYSIS AUTO W/SCOPE: CPT

## 2019-11-19 PROCEDURE — G0101 PR CA SCREEN;PELVIC/BREAST EXAM: ICD-10-PCS | Mod: S$PBB,,, | Performed by: STUDENT IN AN ORGANIZED HEALTH CARE EDUCATION/TRAINING PROGRAM

## 2019-11-19 PROCEDURE — 87086 URINE CULTURE/COLONY COUNT: CPT

## 2019-11-19 PROCEDURE — 99213 OFFICE O/P EST LOW 20 MIN: CPT | Mod: PBBFAC,PN,25 | Performed by: STUDENT IN AN ORGANIZED HEALTH CARE EDUCATION/TRAINING PROGRAM

## 2019-11-19 NOTE — PATIENT INSTRUCTIONS
Persons nine to 18 years of age: 1,300 mg of calcium, 600 IU of vitamin D  Persons 19 to 50 years of age: 1,000 mg of calcium, 600 IU of vitamin D  Persons 51 to 70 years of age: 1,200 mg of calcium, 600 IU of vitamin D  Persons 71 years and older: 1,200 mg of calcium, 800 IU of vitamin D    Constipation:  Controlling constipation may help bladder urgency/leakage and fiber may better control cholesterol and blood glucose.  Start daily fiber.  Take 1 tsp of fiber powder (psyllium or other sugar-free powder).  Mix in 8 oz of water.  Take x 3-5 days.  Then, increase fiber by 1 tsp every 3-5 days until stool is easy to pass.  Stop and continue at that dose.   Do not exceed 6 tsps/day.  May also use over the counter stool softener 1-2 x/day.  AVOID laxatives.    Vaginal Dryness:  Vagifem  Olive oil, coconut oil  Premarin cream/estrogen cream     Mixed urinary incontinence, urge > stress:    --Empty bladder every 3 hours.  Empty well: wait a minute, lean forward on toilet.    --Avoid dietary irritants (see sheet).  Keep diary x 3-5 days to determine your irritants.  --KEGELS: do 10 in AM and 10 in PM, holding each x 10 seconds.  When you feel urge to go, STOP, KEGEL, and when urge has passed, then go to bathroom.  Consider PT in future.    --URGE: consider anticholinergic.   --STRESS:  Pessary vs. Sling.     Bladder Irritants  Certain foods and drinks have been associated with worsening symptoms of urinary frequency, urgency, urge incontinence, or  bladder pain. If you suffer from any of these conditions, you may wish to try eliminating one or more of these foods from your  diet and see if your symptoms improve. If bladder symptoms are related to dietary factors, strict adherence to a diet that  eliminates the food should bring marked relief in 10 days. Once you are feeling better, you can begin to add foods back into  your diet, one at a time. If symptoms return, you will be able to identify the irritant. As you add  foods back to your diet it is  very important that you drink significant amounts of water.    List of Common Bladder Irritants*  Alcoholic beverages  Apples and apple juice  Cantaloupe  Carbonated beverages  Chili and spicy foods  Chocolate  Citrus fruit  Coffee (including decaffeinated)  Cranberries and cranberry juice  Grapes  Guava  Milk Products: milk, cheese, cottage cheese, yogurt, ice cream  Peaches  Pineapple  Plums  Strawberries  Sugar especially artificial sweeteners, saccharin, aspartame, corn sweeteners, honey, fructose, sucrose, lactose  Tea  Tomatoes and tomato juice  Vitamin B complex  Vinegar  *Most people are not sensitive to ALL of these products; your goal is to find the foods that make YOUR  symptoms worse     Low-acid fruit substitutions include apricots, papaya, pears and watermelon. Coffee drinkers can drink Kava or other lowacid instant drinks. Tea drinkers can substitute non-citrus herbal and sun brewed teas. Calcium carbonate co-buffered with  calcium ascorbate can be substituted for Vitamin C. Prelief is a dietary supplement that works as an acid blocker for the  bladder.  Where to get more information:   Overcoming Bladder Disorders by Lynn Tate and Fabricio Smith, 1990   You Dont Have to Live with Cystitis! By Nuria Echols, 1988

## 2019-11-21 LAB — BACTERIA UR CULT: NO GROWTH

## 2019-11-21 NOTE — PROGRESS NOTES
Discussed missing BP readings, patient reports she has recently been very busy.     Verbalized intent to submit a BP reading today to discuss in 1-2 weeks.

## 2019-11-22 ENCOUNTER — PATIENT MESSAGE (OUTPATIENT)
Dept: PHARMACY | Facility: CLINIC | Age: 69
End: 2019-11-22

## 2019-11-22 ENCOUNTER — CLINICAL SUPPORT (OUTPATIENT)
Dept: REHABILITATION | Facility: HOSPITAL | Age: 69
End: 2019-11-22
Payer: MEDICARE

## 2019-11-22 ENCOUNTER — TELEPHONE (OUTPATIENT)
Dept: PHARMACY | Facility: CLINIC | Age: 69
End: 2019-11-22

## 2019-11-22 ENCOUNTER — HOSPITAL ENCOUNTER (OUTPATIENT)
Dept: RADIOLOGY | Facility: HOSPITAL | Age: 69
Discharge: HOME | End: 2019-11-22
Attending: STUDENT IN AN ORGANIZED HEALTH CARE EDUCATION/TRAINING PROGRAM
Payer: MEDICARE

## 2019-11-22 DIAGNOSIS — Z01.419 WELL WOMAN EXAM WITH ROUTINE GYNECOLOGICAL EXAM: ICD-10-CM

## 2019-11-22 DIAGNOSIS — R13.14 PHARYNGOESOPHAGEAL DYSPHAGIA: ICD-10-CM

## 2019-11-22 DIAGNOSIS — N64.4 BREAST PAIN: ICD-10-CM

## 2019-11-22 PROCEDURE — 77066 MAMMO DIGITAL DIAGNOSTIC BILAT WITH TOMOSYNTHESIS_CAD: ICD-10-PCS | Mod: 26,,, | Performed by: RADIOLOGY

## 2019-11-22 PROCEDURE — 76642 ULTRASOUND BREAST LIMITED: CPT | Mod: TC,PO,LT

## 2019-11-22 PROCEDURE — 77066 DX MAMMO INCL CAD BI: CPT | Mod: 26,,, | Performed by: RADIOLOGY

## 2019-11-22 PROCEDURE — 92526 ORAL FUNCTION THERAPY: CPT | Mod: PO

## 2019-11-22 PROCEDURE — 77062 MAMMO DIGITAL DIAGNOSTIC BILAT WITH TOMOSYNTHESIS_CAD: ICD-10-PCS | Mod: 26,,, | Performed by: RADIOLOGY

## 2019-11-22 PROCEDURE — 76642 ULTRASOUND BREAST LIMITED: CPT | Mod: 26,LT,, | Performed by: RADIOLOGY

## 2019-11-22 PROCEDURE — 76642 US BREAST LEFT LIMITED: ICD-10-PCS | Mod: 26,LT,, | Performed by: RADIOLOGY

## 2019-11-22 PROCEDURE — 77066 DX MAMMO INCL CAD BI: CPT | Mod: TC,PO

## 2019-11-22 PROCEDURE — 77062 BREAST TOMOSYNTHESIS BI: CPT | Mod: 26,,, | Performed by: RADIOLOGY

## 2019-11-22 NOTE — PATIENT INSTRUCTIONS
Effortful Swallow:  Goal: The goal of this activity is to keep food or fluid from getting stuck in your pharynx, or throat, by improving the force and timing of your swallow.    Directions:  · Swallow normally, but tightly squeeze your tongue and throat muscles throughout the swallow.  · Try to swallow with as much effort as you can.  · Repeat as instructed by your therapist.    Explanation: The muscles of your tongue and pharynx work together to help you swallow properly. By swallowing with as much effort as possible, you can keep food from getting stuck in your throat.    Supraglottic Swallow:  Goal: The goal of this activity is to improve your ability to close your trachea, or windpipe, before you swallow, and clear any remaining food or liquid.    Directions:  · Take a moderate breath in and hold it.  · Consciously hold your breath and swallow.  · Before taking another breath, immediately cough and swallow again.  · Repeat as instructed by your therapist.    Explanation: Your epiglottis and vocal folds work together to prevent any food or liquid from traveling into your trachea when you swallow. When you swallow, your vocal folds close together, and your epiglottis folds down, to form a barrier to protect your airway. When you consciously hold your breath in this activity, your vocal folds close together so nothing can pass through to your trachea. Then by immediately coughing and swallowing again you clear any remaining food or fluid in your throat.    Chin Tuck Against Resistance (CTAR):  Goal: to strengthen the suprahyoid muscles  Directions:   · Place small resistance ball between chin and chest  · Press chin into ball on chest for 45 repetitions  · Press chin into ball on chest and hold for 45 seconds    Complete each exercise 15x unless otherwise stated 2-3x per day.     Exercise descriptions from:   Home Exercise Program. (n.d.). Retrieved October 10, 2017, from  [email or web address]

## 2019-11-22 NOTE — PROGRESS NOTES
Outpatient Neurological Rehabilitation   Speech and Language Therapy Daily Note  Date:  11/22/2019     Name: Yamel Shah   MRN: [id number]   Therapy Diagnosis:   Encounter Diagnosis   Name Primary?    Pharyngoesophageal dysphagia    Physician: Natalie Dominguez NP  Physician Orders: Ambulatory Consult to Speech Therapy; Concern for possible aspiration  Medical Diagnosis: Recurrent pneumonia [J18.9]  Gastroesophageal reflux disease, esophagitis presence not specified [K21.9]     Visit # / Visits Authorized:  1 / 20   Date of Evaluation:  11/18/2019   Insurance Authorization Period: 10/2/19 to 12/31/19  Plan of Care Certification:    11/18/2019 to 1/3/19   Extended POC:  n/a   Progress Note: due 12/18/19   Visits Cancelled: 0  Visits No Show: 0    Time In:  1115  Time Out:  1155  Total Billable Time: 40 minutes      Precautions: Standard and aspiration, GERD    Subjective:   Pt reports: that even though she is still doing the effortful swallow, she still is experiencing reflux.   She was compliant to home exercise program.   Response to previous treatment: thought it was interesting   Pain Scale:  0/10 on VAS currently.   Pain Location: n/a  Objective:   UNTIMED  Procedure Min.   Dysphagia Therapy  40   Total Untimed Units: 1  Charges Billed/# of units: 1    Short Term Goals: (3 weeks) Current Progress:   1. Pt will complete effortful swallow 60-90x per session to improve pharyngeal stripping wave and tongue base retraction.  Progressing/ Not Met 11/22/2019   X 61 with thin liquids with max verbal cues (1 sip of thin followed by 2 dry swallows in sets of 20)   2. Pt willcomplete Chin Tuck Against Resistance (CTAR) hold for 45 seconds immediately followed by 45 repetitions to improve hyolaryngeal elevation and excursion and PE segment opening.   Progressing/ Not Met 11/22/2019   X 45 second hold x 3  X 45 repetitions with ball x2  X 45 repetitions with rolled towel x 1   3. Pt will complete supraglottic swallow  30-45x per session to improve laryngeal vestibule closure.  Progressing/ Not Met 11/22/2019    x 25 with max verbal cues  (1 sip of thin, effortful swallow followed by two dry swallows with supraglottic swallow)   4. Pt will complete mendelsohn maneuver 30-45x per session to improve hyolaryneal elevation / excursion.   Progressing/ Not Met 11/22/2019   Not formally addressed       Patient Education/Response:   Proper technique for each exercise reviewed. Home program reviewed. Pt verbalized understanding to all.     Written Home Exercises Provided: yes.  Exercises were reviewed and Yamel was able to demonstrate them prior to the end of the session.  Yamel demonstrated good  understanding of the education provided.     See EMR under Patient Instructions for exercises provided 11/22/2019.  Assessment:   Yamel is progressing well towards her goals. Decreased reported symptoms of dysphagia in home environment today. Continues to complain of reflux. Unclear if patient is attempting to manage diet to manage reflux. independence performing exercises increased as session progress. No greater than fatigue level 2/10 reported. Current goals remain appropriate. Goals to be updated as necessary.     Pt prognosis is Fair. Pt will continue to benefit from skilled outpatient speech and language therapy to address the deficits listed in the problem list on initial evaluation, provide pt/family education and to maximize pt's level of independence in the home and community environment.   Medical necessity is demonstrated by the following IMPAIRMENTS:  Decreased efficiency of swallow results in risk for development of aspiration pneumonia  Barriers to Therapy: none   Pt's spiritual, cultural and educational needs considered and pt agreeable to plan of care and goals.  Plan:   Continue POC with focus on improving swallow physiology.    CARMEN Fierro, CCC-SLP   11/22/2019

## 2019-11-22 NOTE — TELEPHONE ENCOUNTER
LVM with patient on 11/22 for Cellcept refill/follow-up. Sent [employer] message. $15 copay in 004.

## 2019-11-26 ENCOUNTER — CLINICAL SUPPORT (OUTPATIENT)
Dept: REHABILITATION | Facility: HOSPITAL | Age: 69
End: 2019-11-26
Payer: MEDICARE

## 2019-11-26 DIAGNOSIS — R13.14 PHARYNGOESOPHAGEAL DYSPHAGIA: ICD-10-CM

## 2019-11-26 PROCEDURE — 92526 ORAL FUNCTION THERAPY: CPT | Mod: PO

## 2019-11-26 NOTE — PROGRESS NOTES
Outpatient Neurological Rehabilitation   Speech and Language Therapy Daily Note  Date:  11/26/2019     Name: Yamel Shah   MRN: [id number]   Therapy Diagnosis:   Encounter Diagnosis   Name Primary?    Pharyngoesophageal dysphagia    Physician: Natalie Dominguez NP  Physician Orders: Ambulatory Consult to Speech Therapy; Concern for possible aspiration  Medical Diagnosis: Recurrent pneumonia [J18.9]  Gastroesophageal reflux disease, esophagitis presence not specified [K21.9]     Visit # / Visits Authorized:  2 / 20 (1 prior to this authorization)  Date of Evaluation:  11/18/2019   Insurance Authorization Period: 10/2/19 to 12/31/19  Plan of Care Certification:    11/18/2019 to 1/3/19   Extended POC:  n/a   Progress Note: due 12/18/19   Visits Cancelled: 0  Visits No Show: 0    Time In:  1304  Time Out:  1345   Total Billable Time: 41 minutes      Precautions: Standard and aspiration, GERD  Subjective:   Pt reports: that swallowing beef this weekend with effortful swallow was easier. Experienced reflux again this weekend after drinkinga drink with a citrus juice.    She was compliant to home exercise program.   Response to previous treatment: thought it was interesting   Pain Scale:  0/10 on VAS currently.   Pain Location: n/a  Objective:   UNTIMED  Procedure Min.   Dysphagia Therapy  41    Total Untimed Units: 1  Charges Billed/# of units: 1    Short Term Goals: (3 weeks) Current Progress:   1. Pt will complete effortful swallow 60-90x per session to improve pharyngeal stripping wave and tongue base retraction.  Progressing/ Not Met 11/26/2019   X 63with thin liquids with max verbal cues (1 sip of thin followed by 2 dry swallows in sets of 21)   2. Pt willcomplete Chin Tuck Against Resistance (CTAR) hold for 45 seconds immediately followed by 45 repetitions to improve hyolaryngeal elevation and excursion and PE segment opening.   Progressing/ Not Met 11/26/2019   X 45 second hold with rolled towel x 2  X 45  repetitions with rolled towel x2   3. Pt will complete supraglottic swallow 30-45x per session to improve laryngeal vestibule closure.  Progressing/ Not Met 11/26/2019    x 30 with max verbal cues     4. Pt will complete mendelsohn maneuver 30-45x per session to improve hyolaryneal elevation / excursion.   Progressing/ Not Met 11/26/2019   Not formally addressed       Patient Education/Response:   Use of washcloth reviewed. Pt asked why she needed to use the towel and not just nod her head. SLP explained that the towel provided resistance to work the muscles. Proper technique for CTAR reviewed. Discussed that Yamel should not push up with her hands while stabilizing the towel the in CTAR exercise.  Pt verbalized understanding to all.       See EMR under Patient Instructions for exercises provided 11/22/2019.  Assessment:   Yamel is progressing well towards her goals. Decreased cueing required for effortful swallow. Current goals remain appropriate. Goals to be updated as necessary.     Pt prognosis is Fair. Pt will continue to benefit from skilled outpatient speech and language therapy to address the deficits listed in the problem list on initial evaluation, provide pt/family education and to maximize pt's level of independence in the home and community environment.   Medical necessity is demonstrated by the following IMPAIRMENTS:  Decreased efficiency of swallow results in risk for development of aspiration pneumonia  Barriers to Therapy: none   Pt's spiritual, cultural and educational needs considered and pt agreeable to plan of care and goals.  Plan:   Continue POC with focus on improving swallow physiology. Attempt exercises with thicker consistency tomorrow.     CARMEN Fierro, CCC-SLP   11/26/2019

## 2019-11-27 ENCOUNTER — DOCUMENTATION ONLY (OUTPATIENT)
Dept: REHABILITATION | Facility: HOSPITAL | Age: 69
End: 2019-11-27

## 2019-11-27 DIAGNOSIS — R13.14 PHARYNGOESOPHAGEAL DYSPHAGIA: ICD-10-CM

## 2019-12-02 ENCOUNTER — CLINICAL SUPPORT (OUTPATIENT)
Dept: REHABILITATION | Facility: HOSPITAL | Age: 69
End: 2019-12-02
Payer: MEDICARE

## 2019-12-02 DIAGNOSIS — R13.14 PHARYNGOESOPHAGEAL DYSPHAGIA: ICD-10-CM

## 2019-12-02 PROCEDURE — 92526 ORAL FUNCTION THERAPY: CPT | Mod: PO

## 2019-12-02 NOTE — PROGRESS NOTES
Outpatient Neurological Rehabilitation   Speech and Language Therapy Daily Note  Date:  12/2/2019     Name: Yamel Shah   MRN: [id number]   Therapy Diagnosis:   Encounter Diagnosis   Name Primary?    Pharyngoesophageal dysphagia    Physician: Natalie Dominguez NP  Physician Orders: Ambulatory Consult to Speech Therapy; Concern for possible aspiration  Medical Diagnosis: Recurrent pneumonia [J18.9]  Gastroesophageal reflux disease, esophagitis presence not specified [K21.9]     Visit # / Visits Authorized:  3/ 20 (1 prior to this authorization)  Date of Evaluation:  11/18/2019   Insurance Authorization Period: 10/2/19 to 12/31/19  Plan of Care Certification:    11/18/2019 to 1/3/19   Extended POC:  n/a   Progress Note: due 12/18/19   Visits Cancelled: 0  Visits No Show: 1    Time In:  0950   Time Out:  1030   Total Billable Time: 40 minutes      Precautions: Standard and aspiration, GERD  Subjective:   Pt reports: that she had difficulty with meats at Thanksgiving where she felt the food stuck in the middle of her esophagus and then moved very slowly down.    She was compliant to home exercise program.   Response to previous treatment: feels it has been helpful  Pain Scale:  0/10 on VAS currently.   Pain Location: n/a  Objective:   UNTIMED  Procedure Min.   Dysphagia Therapy  40    Total Untimed Units: 1  Charges Billed/# of units: 1    Short Term Goals: (3 weeks) Current Progress:   1. Pt will complete effortful swallow 60-90x per session to improve pharyngeal stripping wave and tongue base retraction.  Progressing/ Not Met 12/2/2019   X 21 thin liquids with max verbal cues (1 sip of puree followed by 2 dry swallows in sets of 21)   2. Pt willcomplete Chin Tuck Against Resistance (CTAR) hold for 45 seconds immediately followed by 45 repetitions to improve hyolaryngeal elevation and excursion and PE segment opening.   Progressing/ Not Met 12/2/2019   X 45 second hold with rolled towel x 1  X 45 repetitions with  rolled towel x1   3. Pt will complete supraglottic swallow 30-45x per session to improve laryngeal vestibule closure.  Progressing/ Not Met 12/2/2019   Not formally addressed     4. Pt will complete mendelsohn maneuver 30-45x per session to improve hyolaryneal elevation / excursion.   Progressing/ Not Met 12/2/2019   Not formally addressed       Patient Education/Response:   Pt reports neck pain from hitting her head on rafter of attic x 1 month. Discussed reporting these complaints to PCP and requesting PT referral if appropriate. Discussed progress and importance of GI referral. Discussed that her symptoms have shifted from including more pharyngeal phase deficits to more esophageal phase deficits.  Pt verbalized understanding to all.       See EMR under Patient Instructions for exercises provided 11/22/2019.  Assessment:   Yamel is progressing well towards her goals. Decreasing cueing while performing CTAR allowed for less perceived neck discomfort. Current goals remain appropriate. Goals to be updated as necessary.     Pt prognosis is Fair. Pt will continue to benefit from skilled outpatient speech and language therapy to address the deficits listed in the problem list on initial evaluation, provide pt/family education and to maximize pt's level of independence in the home and community environment.   Medical necessity is demonstrated by the following IMPAIRMENTS:  Decreased efficiency of swallow results in risk for development of aspiration pneumonia  Barriers to Therapy: none   Pt's spiritual, cultural and educational needs considered and pt agreeable to plan of care and goals.  Plan:   Continue POC with focus on improving swallow physiology. Attempt exercises with thicker consistency tomorrow.     CARMEN Fierro, CCC-SLP   12/2/2019

## 2019-12-02 NOTE — TELEPHONE ENCOUNTER
Attempted to reach patient for [employer] refill and follow-up. No answer. Left voicemail for call back. Will follow-up.

## 2019-12-03 NOTE — PROGRESS NOTES
Outpatient Neurological Rehabilitation   Speech and Language Therapy Daily Note  Date:  12/5/2019     Name: Yamel Shah   MRN: [id number]   Therapy Diagnosis:   Encounter Diagnosis   Name Primary?    Pharyngoesophageal dysphagia    Physician: Natalie Dominguez NP  Physician Orders: Ambulatory Consult to Speech Therapy; Concern for possible aspiration  Medical Diagnosis: Recurrent pneumonia [J18.9]  Gastroesophageal reflux disease, esophagitis presence not specified [K21.9]     Visit # / Visits Authorized:  4/ 20 (1 prior to this authorization)  Date of Evaluation:  11/18/2019   Insurance Authorization Period: 10/2/19 to 12/31/19  Plan of Care Certification:    11/18/2019 to 1/3/19   Extended POC:  n/a   Progress Note: due 12/18/19   Visits Cancelled: 0  Visits No Show: 1    Time In:  0945  Time Out:  1020   Total Billable Time: 35  minutes      Precautions: Standard and aspiration, GERD  Subjective:   Pt reports: that she continues to experience daily reflux. She also reports a 2 year history of hoarseness that worsens when her reflux is worse. SLP counseled that we will re-assess voice after reflux is managed.  She was compliant to home exercise program.   Pain Scale:  0/10 on VAS currently.   Pain Location: n/a  Objective:   UNTIMED  Procedure Min.   Dysphagia Therapy  40    Total Untimed Units: 1  Charges Billed/# of units: 1    Short Term Goals: (3 weeks) Current Progress:   1. Pt will complete effortful swallow 60-90x per session to improve pharyngeal stripping wave and tongue base retraction.  Progressing/ Not Met 12/5/2019   X 40 thin liquids with max verbal cues (2 sets of 20)   2. Pt willcomplete Chin Tuck Against Resistance (CTAR) hold for 45 seconds immediately followed by 45 repetitions to improve hyolaryngeal elevation and excursion and PE segment opening.   Progressing/ Not Met 12/5/2019   X 45 second hold with rolled towel x 1  X 30 second hold with a rolled towel x 1  X 45 repetitions with a  rolled towel x 1  X 30 repetitions with a rolled towel x 1    Decreased discomfort on 30 second hold and repetitions of 30    3. Pt will complete supraglottic swallow 30-45x per session to improve laryngeal vestibule closure.  Progressing/ Not Met 12/5/2019   Not formally addressed     4. Pt will complete mendelsohn maneuver 30-45x per session to improve hyolaryneal elevation / excursion.   Progressing/ Not Met 12/5/2019   Not formally addressed       Patient Education/Response:   Discussed patient communicating with her PCP regarding neck pain. Discussed putting ST on hold until after GI referral. Pt to call GI office to make appointment. Pt verbalized understanding to all.       See EMR under Patient Instructions for exercises provided 11/22/2019.  Assessment:   Yamel is progressing well towards her goals. Pt performed exercises independently with verbal cues prior to onset of exercises. As Yamel is no longer experiencing overt s/sx of aspiration, it is likely that her swallowing complaints are esophageal primary which is best managed by GI. ST on hold until reflux is managed. Yamel to follow up with SLP after GI consult.      Pt prognosis is Fair. Pt will continue to benefit from skilled outpatient speech and language therapy to address the deficits listed in the problem list on initial evaluation, provide pt/family education and to maximize pt's level of independence in the home and community environment.   Medical necessity is demonstrated by the following IMPAIRMENTS:  Decreased efficiency of swallow results in risk for development of aspiration pneumonia  Barriers to Therapy: none   Pt's spiritual, cultural and educational needs considered and pt agreeable to plan of care and goals.  Plan:   ST on hold until after GI consult.     CARMEN Fierro, CCC-SLP   12/5/2019

## 2019-12-05 ENCOUNTER — CLINICAL SUPPORT (OUTPATIENT)
Dept: REHABILITATION | Facility: HOSPITAL | Age: 69
End: 2019-12-05
Payer: MEDICARE

## 2019-12-05 ENCOUNTER — PATIENT MESSAGE (OUTPATIENT)
Dept: GASTROENTEROLOGY | Facility: CLINIC | Age: 69
End: 2019-12-05

## 2019-12-05 DIAGNOSIS — R13.14 PHARYNGOESOPHAGEAL DYSPHAGIA: ICD-10-CM

## 2019-12-05 PROCEDURE — 92526 ORAL FUNCTION THERAPY: CPT | Mod: PO

## 2019-12-05 NOTE — PROGRESS NOTES
Subjective:       Patient ID: Yamel Shah is a 69 y.o. female.    Chief Complaint: Neck Pain    Patient is a 69 y.o.female who presents today for neck pain.     A couple of months ago, she hit her head on one of the rafters on her pepe. It hurt for a while but it had resolved. Pain is now located in occiput and traveling to the neck. Ongoing for two months. No LOC when she hit the rafter. No changes in vision. When she does sidebend in either direction she does have decreased range of motion to the cervical spine. She rates the pain 4/10 and worse at night. She is taking lyrica and it does help her discomfort.   Review of Systems   Constitutional: Negative for appetite change, chills, diaphoresis and fever.   HENT: Negative for congestion, ear discharge, ear pain, postnasal drip, tinnitus, trouble swallowing and voice change.    Eyes: Negative for discharge, redness and itching.   Respiratory: Negative for cough, chest tightness, shortness of breath and wheezing.    Cardiovascular: Negative for chest pain, palpitations and leg swelling.   Gastrointestinal: Negative for abdominal pain, constipation, diarrhea, nausea and vomiting.   Endocrine: Negative for cold intolerance and heat intolerance.   Genitourinary: Negative for difficulty urinating, flank pain, hematuria and urgency.   Musculoskeletal: Positive for myalgias and neck pain. Negative for arthralgias, gait problem and neck stiffness.   Skin: Negative for color change and rash.   Neurological: Negative for dizziness, seizures, syncope and headaches.   Hematological: Negative for adenopathy.   Psychiatric/Behavioral: Negative for agitation, behavioral problems, confusion and sleep disturbance.       Objective:      Physical Exam   Constitutional: She is oriented to person, place, and time. She appears well-developed and well-nourished. No distress.   HENT:   Head: Normocephalic and atraumatic.   Right Ear: External ear normal.   Left Ear: External ear  normal.   Nose: Nose normal.   Mouth/Throat: Oropharynx is clear and moist. No oropharyngeal exudate.   Eyes: Pupils are equal, round, and reactive to light. Conjunctivae and EOM are normal. Right eye exhibits no discharge. Left eye exhibits no discharge. No scleral icterus.   Neck: Neck supple. No JVD present. Spinous process tenderness and muscular tenderness present. No neck rigidity. Decreased range of motion (upon flexion, bilateral sidebending and bilateral rotation) present. No tracheal deviation, no edema and no erythema present. No thyromegaly present.   Cardiovascular: Normal rate, normal heart sounds and intact distal pulses. Exam reveals no gallop and no friction rub.   No murmur heard.  Pulmonary/Chest: Effort normal and breath sounds normal. No stridor. No respiratory distress. She has no wheezes. She has no rales. She exhibits no tenderness.   Abdominal: Soft. Bowel sounds are normal. She exhibits no distension. There is no tenderness. There is no rebound.   Musculoskeletal: She exhibits no edema or tenderness.   Lymphadenopathy:     She has no cervical adenopathy.   Neurological: She is alert and oriented to person, place, and time.   Skin: Skin is warm and dry. No rash noted. She is not diaphoretic. No erythema.   Psychiatric: She has a normal mood and affect. Her behavior is normal.   Nursing note and vitals reviewed.      Assessment and Plan:       1. Nonintractable headache, unspecified chronicity pattern, unspecified headache type  - CT Head Without Contrast; Future    2. Neck pain  - X-Ray Cervical Spine Complete 5 view; Future      Notify clinic if symptoms change, worsen or do not improve    Will wait on imaging to decide plan of care.        No follow-ups on file.

## 2019-12-06 ENCOUNTER — HOSPITAL ENCOUNTER (OUTPATIENT)
Dept: RADIOLOGY | Facility: HOSPITAL | Age: 69
Discharge: HOME | End: 2019-12-06
Attending: INTERNAL MEDICINE
Payer: MEDICARE

## 2019-12-06 ENCOUNTER — OFFICE VISIT (OUTPATIENT)
Dept: INTERNAL MEDICINE | Facility: CLINIC | Age: 69
End: 2019-12-06
Payer: MEDICARE

## 2019-12-06 ENCOUNTER — PATIENT MESSAGE (OUTPATIENT)
Dept: INTERNAL MEDICINE | Facility: CLINIC | Age: 69
End: 2019-12-06

## 2019-12-06 VITALS
BODY MASS INDEX: 26.48 KG/M2 | WEIGHT: 158.94 LBS | DIASTOLIC BLOOD PRESSURE: 60 MMHG | HEART RATE: 72 BPM | HEIGHT: 65 IN | RESPIRATION RATE: 16 BRPM | SYSTOLIC BLOOD PRESSURE: 118 MMHG | TEMPERATURE: 98 F

## 2019-12-06 DIAGNOSIS — R51.9 NONINTRACTABLE HEADACHE, UNSPECIFIED CHRONICITY PATTERN, UNSPECIFIED HEADACHE TYPE: Primary | ICD-10-CM

## 2019-12-06 DIAGNOSIS — M54.2 NECK PAIN: ICD-10-CM

## 2019-12-06 DIAGNOSIS — R51.9 NONINTRACTABLE HEADACHE, UNSPECIFIED CHRONICITY PATTERN, UNSPECIFIED HEADACHE TYPE: ICD-10-CM

## 2019-12-06 PROCEDURE — 99214 PR OFFICE/OUTPT VISIT, EST, LEVL IV, 30-39 MIN: ICD-10-PCS | Mod: S$PBB,,, | Performed by: INTERNAL MEDICINE

## 2019-12-06 PROCEDURE — 72050 X-RAY EXAM NECK SPINE 4/5VWS: CPT | Mod: 26,,, | Performed by: RADIOLOGY

## 2019-12-06 PROCEDURE — 99999 PR PBB SHADOW E&M-EST. PATIENT-LVL III: CPT | Mod: PBBFAC,,, | Performed by: INTERNAL MEDICINE

## 2019-12-06 PROCEDURE — 99999 PR PBB SHADOW E&M-EST. PATIENT-LVL III: ICD-10-PCS | Mod: PBBFAC,,, | Performed by: INTERNAL MEDICINE

## 2019-12-06 PROCEDURE — 70450 CT HEAD WITHOUT CONTRAST: ICD-10-PCS | Mod: 26,,, | Performed by: RADIOLOGY

## 2019-12-06 PROCEDURE — 72050 XR CERVICAL SPINE COMPLETE 5 VIEW: ICD-10-PCS | Mod: 26,,, | Performed by: RADIOLOGY

## 2019-12-06 PROCEDURE — 70450 CT HEAD/BRAIN W/O DYE: CPT | Mod: TC

## 2019-12-06 PROCEDURE — 70450 CT HEAD/BRAIN W/O DYE: CPT | Mod: 26,,, | Performed by: RADIOLOGY

## 2019-12-06 PROCEDURE — 1125F AMNT PAIN NOTED PAIN PRSNT: CPT | Mod: ,,, | Performed by: INTERNAL MEDICINE

## 2019-12-06 PROCEDURE — 99214 OFFICE O/P EST MOD 30 MIN: CPT | Mod: S$PBB,,, | Performed by: INTERNAL MEDICINE

## 2019-12-06 PROCEDURE — 72050 X-RAY EXAM NECK SPINE 4/5VWS: CPT | Mod: TC

## 2019-12-06 PROCEDURE — 1159F MED LIST DOCD IN RCRD: CPT | Mod: ,,, | Performed by: INTERNAL MEDICINE

## 2019-12-06 PROCEDURE — 1159F PR MEDICATION LIST DOCUMENTED IN MEDICAL RECORD: ICD-10-PCS | Mod: ,,, | Performed by: INTERNAL MEDICINE

## 2019-12-06 PROCEDURE — 99213 OFFICE O/P EST LOW 20 MIN: CPT | Mod: PBBFAC,PO,25 | Performed by: INTERNAL MEDICINE

## 2019-12-06 PROCEDURE — 1125F PR PAIN SEVERITY QUANTIFIED, PAIN PRESENT: ICD-10-PCS | Mod: ,,, | Performed by: INTERNAL MEDICINE

## 2019-12-06 RX ORDER — CYCLOBENZAPRINE HCL 5 MG
5 TABLET ORAL NIGHTLY
Qty: 10 TABLET | Refills: 0 | Status: SHIPPED | OUTPATIENT
Start: 2019-12-06 | End: 2019-12-16

## 2019-12-08 ENCOUNTER — OFFICE VISIT (OUTPATIENT)
Dept: URGENT CARE | Facility: CLINIC | Age: 69
End: 2019-12-08
Payer: MEDICARE

## 2019-12-08 VITALS
OXYGEN SATURATION: 98 % | HEIGHT: 65 IN | BODY MASS INDEX: 26.33 KG/M2 | RESPIRATION RATE: 16 BRPM | TEMPERATURE: 99 F | DIASTOLIC BLOOD PRESSURE: 63 MMHG | SYSTOLIC BLOOD PRESSURE: 134 MMHG | HEART RATE: 80 BPM | WEIGHT: 158 LBS

## 2019-12-08 DIAGNOSIS — R52 BODY ACHES: ICD-10-CM

## 2019-12-08 DIAGNOSIS — R05.9 COUGH: ICD-10-CM

## 2019-12-08 DIAGNOSIS — J40 SINOBRONCHITIS: Primary | ICD-10-CM

## 2019-12-08 DIAGNOSIS — J98.01 ACUTE BRONCHOSPASM: ICD-10-CM

## 2019-12-08 DIAGNOSIS — J32.9 SINOBRONCHITIS: Primary | ICD-10-CM

## 2019-12-08 LAB
CTP QC/QA: YES
FLUAV AG NPH QL: NEGATIVE
FLUBV AG NPH QL: NEGATIVE

## 2019-12-08 PROCEDURE — 96372 PR INJECTION,THERAP/PROPH/DIAG2ST, IM OR SUBCUT: ICD-10-PCS | Mod: 59,S$GLB,, | Performed by: NURSE PRACTITIONER

## 2019-12-08 PROCEDURE — 87804 POCT INFLUENZA A/B: ICD-10-PCS | Mod: QW,S$GLB,, | Performed by: NURSE PRACTITIONER

## 2019-12-08 PROCEDURE — 99214 PR OFFICE/OUTPT VISIT, EST, LEVL IV, 30-39 MIN: ICD-10-PCS | Mod: 25,S$GLB,, | Performed by: NURSE PRACTITIONER

## 2019-12-08 PROCEDURE — 96372 THER/PROPH/DIAG INJ SC/IM: CPT | Mod: 59,S$GLB,, | Performed by: NURSE PRACTITIONER

## 2019-12-08 PROCEDURE — 71046 XR CHEST PA AND LATERAL: ICD-10-PCS | Mod: S$GLB,,, | Performed by: RADIOLOGY

## 2019-12-08 PROCEDURE — 87804 INFLUENZA ASSAY W/OPTIC: CPT | Mod: QW,S$GLB,, | Performed by: NURSE PRACTITIONER

## 2019-12-08 PROCEDURE — 99214 OFFICE O/P EST MOD 30 MIN: CPT | Mod: 25,S$GLB,, | Performed by: NURSE PRACTITIONER

## 2019-12-08 PROCEDURE — 94640 AIRWAY INHALATION TREATMENT: CPT | Mod: S$GLB,,, | Performed by: NURSE PRACTITIONER

## 2019-12-08 PROCEDURE — 94640 PR INHAL RX, AIRWAY OBST/DX SPUTUM INDUCT: ICD-10-PCS | Mod: S$GLB,,, | Performed by: NURSE PRACTITIONER

## 2019-12-08 PROCEDURE — 71046 X-RAY EXAM CHEST 2 VIEWS: CPT | Mod: S$GLB,,, | Performed by: RADIOLOGY

## 2019-12-08 RX ORDER — AMOXICILLIN AND CLAVULANATE POTASSIUM 600; 42.9 MG/5ML; MG/5ML
875 POWDER, FOR SUSPENSION ORAL EVERY 12 HOURS
Qty: 150 ML | Refills: 0 | Status: SHIPPED | OUTPATIENT
Start: 2019-12-08 | End: 2019-12-17

## 2019-12-08 RX ORDER — CODEINE PHOSPHATE AND GUAIFENESIN 10; 100 MG/5ML; MG/5ML
10 SOLUTION ORAL EVERY 6 HOURS PRN
Qty: 120 ML | Refills: 0 | Status: SHIPPED | OUTPATIENT
Start: 2019-12-08 | End: 2019-12-17

## 2019-12-08 RX ORDER — IPRATROPIUM BROMIDE 0.5 MG/2.5ML
0.5 SOLUTION RESPIRATORY (INHALATION)
Status: COMPLETED | OUTPATIENT
Start: 2019-12-08 | End: 2019-12-08

## 2019-12-08 RX ORDER — CEFTRIAXONE 1 G/1
1 INJECTION, POWDER, FOR SOLUTION INTRAMUSCULAR; INTRAVENOUS
Status: COMPLETED | OUTPATIENT
Start: 2019-12-08 | End: 2019-12-08

## 2019-12-08 RX ORDER — ALBUTEROL SULFATE 0.83 MG/ML
2.5 SOLUTION RESPIRATORY (INHALATION)
Status: COMPLETED | OUTPATIENT
Start: 2019-12-08 | End: 2019-12-08

## 2019-12-08 RX ADMIN — ALBUTEROL SULFATE 2.5 MG: 0.83 SOLUTION RESPIRATORY (INHALATION) at 02:12

## 2019-12-08 RX ADMIN — CEFTRIAXONE 1 G: 1 INJECTION, POWDER, FOR SOLUTION INTRAMUSCULAR; INTRAVENOUS at 02:12

## 2019-12-08 RX ADMIN — IPRATROPIUM BROMIDE 0.5 MG: 0.5 SOLUTION RESPIRATORY (INHALATION) at 02:12

## 2019-12-08 NOTE — PATIENT INSTRUCTIONS
Follow up closely with your PCP and/or Pulmonologist.    Please drink plenty of fluids.  Please get plenty of rest.  Please return here or go to the Emergency Department for any concerns or worsening of condition.  If you were prescribed antibiotics, please take them to completion.  If you were prescribed a narcotic medication, do not drive or operate heavy equipment or machinery while taking these medications.  If you were given a steroid shot in the clinic and have also been given a prescription for a steroid such as Prednisone or a Medrol Dose Pack, please begin taking them tomorrow.  If you do not have Hypertension or any history of palpitations, it is ok to take over the counter Sudafed or Mucinex D or Allegra-D or Claritin-D or Zyrtec-D.  If you do take one of the above, it is ok to combine that with plain over the counter Mucinex or Allegra or Claritin or Zyrtec.  If for example you are taking Zyrtec -D, you can combine that with Mucinex, but not Mucinex-D.  If you are taking Mucinex-D, you can combine that with plain Allegra or Claritin or Zyrtec.   If you do have Hypertension or palpitations, it is safe to take Coricidin HBP for relief of sinus symptoms.  If not allergic, please take over the counter Tylenol (Acetaminophen) and/or Motrin (Ibuprofen) as directed for control of pain and/or fever.  Please follow up with your primary care doctor or specialist as needed.    If you  smoke, please stop smoking.  Bronchitis, Antibiotic Treatment (Adult)    Bronchitis is an infection of the air passages (bronchial tubes) in your lungs. It often occurs when you have a cold. This illness is contagious during the first few days and is spread through the air by coughing and sneezing, or by direct contact (touching the sick person and then touching your own eyes, nose, or mouth).  Symptoms of bronchitis include cough with mucus (phlegm) and low-grade fever. Bronchitis usually lasts 7 to 14 days. Mild cases can be treated  with simple home remedies. More severe infection is treated with an antibiotic.  Home care  Follow these guidelines when caring for yourself at home:  · If your symptoms are severe, rest at home for the first 2 to 3 days. When you go back to your usual activities, don't let yourself get too tired.  · Do not smoke. Also avoid being exposed to secondhand smoke.  · You may use over-the-counter medicines to control fever or pain, unless another medicine was prescribed. (Note: If you have chronic liver or kidney disease or have ever had a stomach ulcer or gastrointestinal bleeding, talk with your healthcare provider before using these medicines. Also talk to your provider if you are taking medicine to prevent blood clots.) Aspirin should never be given to anyone younger than 18 years of age who is ill with a viral infection or fever. It may cause severe liver or brain damage.  · Your appetite may be poor, so a light diet is fine. Avoid dehydration by drinking 6 to 8 glasses of fluids per day (such as water, soft drinks, sports drinks, juices, tea, or soup). Extra fluids will help loosen secretions in the nose and lungs.  · Over-the-counter cough, cold, and sore-throat medicines will not shorten the length of the illness, but they may be helpful to reduce symptoms. (Note: Do not use decongestants if you have high blood pressure.)  · Finish all antibiotic medicine. Do this even if you are feeling better after only a few days.  Follow-up care  Follow up with your healthcare provider, or as advised. If you had an X-ray or ECG (electrocardiogram), a specialist will review it. You will be notified of any new findings that may affect your care.  Note: If you are age 65 or older, or if you have a chronic lung disease or condition that affects your immune system, or you smoke, talk to your healthcare provider about having pneumococcal vaccinations and a yearly influenza vaccination (flu shot).  When to seek medical advice  Call  your healthcare provider right away if any of these occur:  · Fever of 100.4°F (38°C) or higher  · Coughing up increased amounts of colored sputum  · Weakness, drowsiness, headache, facial pain, ear pain, or a stiff neck  Call 911, or get immediate medical care  Contact emergency services right away if any of these occur.  · Coughing up blood  · Worsening weakness, drowsiness, headache, or stiff neck  · Trouble breathing, wheezing, or pain with breathing  Date Last Reviewed: 9/13/2015 © 2000-2017 [employer]. [address] [place] [id number]. All rights reserved. This information is not intended as a substitute for professional medical care. Always follow your healthcare professional's instructions.

## 2019-12-08 NOTE — PROGRESS NOTES
"Subjective:       Patient ID: Yamel Shah is a 69 y.o. female.    Vitals:  height is 5' 5" (1.651 m) and weight is 71.7 kg (158 lb). Her temperature is 99.4 °F (37.4 °C). Her blood pressure is 134/63 and her pulse is 80. Her respiration is 16 and oxygen saturation is 98%.     Chief Complaint: URI    Pt states her symptoms started yesterday with running nose, and today the coughing and body aches started.  Patient states that she keeps getting this about once a month and is being treated by her PCP every time like a recurrent pneumonia.  Hx of Scleroderma and immunosuppression.  She has a nebulizer at home but has not used it for this.  Denies sick contacts.    URI    This is a new problem. The current episode started yesterday. The problem has been unchanged. Maximum temperature: Subjective, never measured. Associated symptoms include congestion, coughing, headaches, rhinorrhea, sneezing, a sore throat and wheezing. Pertinent negatives include no abdominal pain, chest pain, diarrhea, dysuria, ear pain, joint pain, joint swelling, nausea, neck pain, plugged ear sensation, rash, sinus pain, swollen glands or vomiting. She has tried nothing for the symptoms.       Constitution: Negative for chills, sweating, fatigue and fever.   HENT: Positive for congestion, sinus pressure and sore throat. Negative for ear pain, sinus pain and voice change.    Neck: Negative for neck pain and painful lymph nodes.   Cardiovascular: Negative for chest pain.   Eyes: Negative for eye redness.   Respiratory: Positive for cough and wheezing. Negative for chest tightness, sputum production, bloody sputum, COPD, shortness of breath, stridor and asthma.    Gastrointestinal: Negative for abdominal pain, nausea, vomiting and diarrhea.   Genitourinary: Negative for dysuria.   Musculoskeletal: Positive for muscle ache.   Skin: Negative for rash.   Allergic/Immunologic: Positive for sneezing. Negative for seasonal allergies and asthma. "   Neurological: Positive for headaches.   Hematologic/Lymphatic: Negative for swollen lymph nodes.       Objective:      Physical Exam   Constitutional: She is oriented to person, place, and time. She appears well-developed and well-nourished. She is cooperative.  Non-toxic appearance. She does not have a sickly appearance. She does not appear ill. No distress.   HENT:   Head: Normocephalic and atraumatic.   Right Ear: Hearing, tympanic membrane, external ear and ear canal normal.   Left Ear: Hearing, tympanic membrane, external ear and ear canal normal.   Nose: Nose normal. No mucosal edema, rhinorrhea or nasal deformity. No epistaxis. Right sinus exhibits no maxillary sinus tenderness and no frontal sinus tenderness. Left sinus exhibits no maxillary sinus tenderness and no frontal sinus tenderness.   Mouth/Throat: Uvula is midline, oropharynx is clear and moist and mucous membranes are normal. No trismus in the jaw. Normal dentition. No uvula swelling. No oropharyngeal exudate, posterior oropharyngeal edema or posterior oropharyngeal erythema.   Eyes: Conjunctivae and lids are normal. No scleral icterus.   Neck: Trachea normal, full passive range of motion without pain and phonation normal. Neck supple. No neck rigidity. No edema and no erythema present.   Cardiovascular: Normal rate, regular rhythm, normal heart sounds, intact distal pulses and normal pulses.   Pulmonary/Chest: Effort normal. No respiratory distress. She has no decreased breath sounds. She has wheezes in the right upper field, the right lower field and the left lower field. She has no rhonchi.   Abdominal: Normal appearance.   Musculoskeletal: Normal range of motion. She exhibits no edema or deformity.   Neurological: She is alert and oriented to person, place, and time. She exhibits normal muscle tone. Coordination normal.   Skin: Skin is warm, dry, intact, not diaphoretic and not pale.   Psychiatric: She has a normal mood and affect. Her speech  is normal and behavior is normal. Judgment and thought content normal. Cognition and memory are normal.   Nursing note and vitals reviewed.    Results for orders placed or performed in visit on 12/08/19   POCT Influenza A/B   Result Value Ref Range    Rapid Influenza A Ag Negative Negative    Rapid Influenza B Ag Negative Negative     Acceptable Yes      X-ray Chest Pa And Lateral    Result Date: 12/8/2019  EXAMINATION: XR CHEST PA AND LATERAL CLINICAL HISTORY: Cough TECHNIQUE: PA and lateral views of the chest were performed. COMPARISON: Radiograph 11/12/2019. FINDINGS: Mediastinal structures are midline.  Hilar contours are unremarkable.  Cardiac silhouette is stable in size.  Lung volumes are low but symmetric.  Subsegmental areas of ground-glass attenuation and reticulation noted within the lower lung zones, findings are stable when compared to the previous exam and consistent with the patient's known interstitial lung disease.  No new focal consolidation.  No pneumothorax or pleural effusions.  No free air beneath the diaphragm.  No acute osseous abnormalities.     1. No interval detrimental change when compared to radiograph dated 11/12/2019.  Stable chronic findings as detailed above. Electronically signed by: Aleks Nelson MD Date:    12/08/2019 Time:    14:31    Breath sounds much improved to auscultation post breathing treatment.  Pulse ox improved to 98% on room air.      Assessment:       1. Sinobronchitis    2. Body aches    3. Cough    4. Acute bronchospasm        Plan:         Sinobronchitis  -     amoxicillin-clavulanate (AUGMENTIN) 600-42.9 mg/5 mL SusR; Take 7.5 mLs (900 mg total) by mouth every 12 (twelve) hours. for 10 days  Dispense: 150 mL; Refill: 0  -     cefTRIAXone injection 1 g  -     guaifenesin-codeine 100-10 mg/5 ml (TUSSI-ORGANIDIN NR)  mg/5 mL syrup; Take 10 mLs by mouth every 6 (six) hours as needed for Cough.  Dispense: 120 mL; Refill: 0    Body aches  -      POCT Influenza A/B    Cough  -     X-Ray Chest PA And Lateral; Future; Expected date: 12/08/2019    Acute bronchospasm  -     X-Ray Chest PA And Lateral; Future; Expected date: 12/08/2019  -     ipratropium 0.02 % nebulizer solution 0.5 mg  -     albuterol nebulizer solution 2.5 mg      Patient Instructions   Follow up closely with your PCP and/or Pulmonologist.    Please drink plenty of fluids.  Please get plenty of rest.  Please return here or go to the Emergency Department for any concerns or worsening of condition.  If you were prescribed antibiotics, please take them to completion.  If you were prescribed a narcotic medication, do not drive or operate heavy equipment or machinery while taking these medications.  If you were given a steroid shot in the clinic and have also been given a prescription for a steroid such as Prednisone or a Medrol Dose Pack, please begin taking them tomorrow.  If you do not have Hypertension or any history of palpitations, it is ok to take over the counter Sudafed or Mucinex D or Allegra-D or Claritin-D or Zyrtec-D.  If you do take one of the above, it is ok to combine that with plain over the counter Mucinex or Allegra or Claritin or Zyrtec.  If for example you are taking Zyrtec -D, you can combine that with Mucinex, but not Mucinex-D.  If you are taking Mucinex-D, you can combine that with plain Allegra or Claritin or Zyrtec.   If you do have Hypertension or palpitations, it is safe to take Coricidin HBP for relief of sinus symptoms.  If not allergic, please take over the counter Tylenol (Acetaminophen) and/or Motrin (Ibuprofen) as directed for control of pain and/or fever.  Please follow up with your primary care doctor or specialist as needed.    If you  smoke, please stop smoking.  Bronchitis, Antibiotic Treatment (Adult)    Bronchitis is an infection of the air passages (bronchial tubes) in your lungs. It often occurs when you have a cold. This illness is contagious during the  first few days and is spread through the air by coughing and sneezing, or by direct contact (touching the sick person and then touching your own eyes, nose, or mouth).  Symptoms of bronchitis include cough with mucus (phlegm) and low-grade fever. Bronchitis usually lasts 7 to 14 days. Mild cases can be treated with simple home remedies. More severe infection is treated with an antibiotic.  Home care  Follow these guidelines when caring for yourself at home:  · If your symptoms are severe, rest at home for the first 2 to 3 days. When you go back to your usual activities, don't let yourself get too tired.  · Do not smoke. Also avoid being exposed to secondhand smoke.  · You may use over-the-counter medicines to control fever or pain, unless another medicine was prescribed. (Note: If you have chronic liver or kidney disease or have ever had a stomach ulcer or gastrointestinal bleeding, talk with your healthcare provider before using these medicines. Also talk to your provider if you are taking medicine to prevent blood clots.) Aspirin should never be given to anyone younger than 18 years of age who is ill with a viral infection or fever. It may cause severe liver or brain damage.  · Your appetite may be poor, so a light diet is fine. Avoid dehydration by drinking 6 to 8 glasses of fluids per day (such as water, soft drinks, sports drinks, juices, tea, or soup). Extra fluids will help loosen secretions in the nose and lungs.  · Over-the-counter cough, cold, and sore-throat medicines will not shorten the length of the illness, but they may be helpful to reduce symptoms. (Note: Do not use decongestants if you have high blood pressure.)  · Finish all antibiotic medicine. Do this even if you are feeling better after only a few days.  Follow-up care  Follow up with your healthcare provider, or as advised. If you had an X-ray or ECG (electrocardiogram), a specialist will review it. You will be notified of any new findings  that may affect your care.  Note: If you are age 65 or older, or if you have a chronic lung disease or condition that affects your immune system, or you smoke, talk to your healthcare provider about having pneumococcal vaccinations and a yearly influenza vaccination (flu shot).  When to seek medical advice  Call your healthcare provider right away if any of these occur:  · Fever of 100.4°F (38°C) or higher  · Coughing up increased amounts of colored sputum  · Weakness, drowsiness, headache, facial pain, ear pain, or a stiff neck  Call 911, or get immediate medical care  Contact emergency services right away if any of these occur.  · Coughing up blood  · Worsening weakness, drowsiness, headache, or stiff neck  · Trouble breathing, wheezing, or pain with breathing  Date Last Reviewed: 9/13/2015  © 5146-5994 [email or web address] [address] [place] [address]. All rights reserved. This information is not intended as a substitute for professional medical care. Always follow your healthcare professional's instructions.

## 2019-12-09 ENCOUNTER — PATIENT MESSAGE (OUTPATIENT)
Dept: PRIMARY CARE CLINIC | Facility: CLINIC | Age: 69
End: 2019-12-09

## 2019-12-09 ENCOUNTER — PATIENT MESSAGE (OUTPATIENT)
Dept: RHEUMATOLOGY | Facility: CLINIC | Age: 69
End: 2019-12-09

## 2019-12-10 ENCOUNTER — PATIENT MESSAGE (OUTPATIENT)
Dept: RHEUMATOLOGY | Facility: CLINIC | Age: 69
End: 2019-12-10

## 2019-12-10 NOTE — PROGRESS NOTES
"[facility]: Health  Follow-Up    Patient reports she is "okay", recovering from an upper respiratory infection. Reports she has been resting in bed, feeling a "little better". Reports her  is taking care of her. Reports she is using OTC Flonase, denies use of decongestants. Reports she is able to sleep through the night.     Reports her children and grandchildren are coming in town for [place] this year.     The history is provided by the patient.     Follow Up  Follow-up reason(s): reading review and routine education      Readings are trending up due to lifestyle change and respiratory infection .        INTERVENTION(S)  encouragement/support, denied further coaching, denied resources and denied questions    PLAN  patient verbalizes understanding, demonstrates understanding via teach back, patient amenable to changes and continue monitoring      There are no preventive care reminders to display for this patient.    Last 5 Patient Entered Readings                                      Current 30 Day Average: 137/66     Recent Readings 12/6/2019 12/3/2019 11/30/2019 11/26/2019 11/23/2019    SBP (mmHg) 138 148 144 144 124    DBP (mmHg) 67 70 68 66 56    Pulse 87 64 87 70 69                      Diet Screening   No change to diet.  Patient reports eating or drinking the following: "mostly" water, hot tea     Recovering from upper respiratory infection.     Physical Activity Screening   When asked if exercising, patient responded: unable    Medication Adherence Screening   She misses doses: never          "

## 2019-12-14 ENCOUNTER — OFFICE VISIT (OUTPATIENT)
Dept: INTERNAL MEDICINE | Facility: CLINIC | Age: 69
End: 2019-12-14
Payer: MEDICARE

## 2019-12-14 VITALS
SYSTOLIC BLOOD PRESSURE: 120 MMHG | DIASTOLIC BLOOD PRESSURE: 74 MMHG | HEART RATE: 86 BPM | WEIGHT: 158.31 LBS | HEIGHT: 65 IN | BODY MASS INDEX: 26.38 KG/M2 | OXYGEN SATURATION: 97 %

## 2019-12-14 DIAGNOSIS — B96.89 ACUTE BACTERIAL BRONCHITIS: Primary | ICD-10-CM

## 2019-12-14 DIAGNOSIS — K21.9 GASTROESOPHAGEAL REFLUX DISEASE, ESOPHAGITIS PRESENCE NOT SPECIFIED: ICD-10-CM

## 2019-12-14 DIAGNOSIS — R13.14 PHARYNGOESOPHAGEAL DYSPHAGIA: ICD-10-CM

## 2019-12-14 DIAGNOSIS — G60.9 IDIOPATHIC NEUROPATHY: ICD-10-CM

## 2019-12-14 DIAGNOSIS — I77.1 ARTERIAL INSUFFICIENCY WITH ISCHEMIC ULCER: ICD-10-CM

## 2019-12-14 DIAGNOSIS — M24.50 FLEXION CONTRACTURES: ICD-10-CM

## 2019-12-14 DIAGNOSIS — D50.9 IRON DEFICIENCY ANEMIA, UNSPECIFIED IRON DEFICIENCY ANEMIA TYPE: ICD-10-CM

## 2019-12-14 DIAGNOSIS — M34.9 SCLERODERMA: ICD-10-CM

## 2019-12-14 DIAGNOSIS — R32 URINARY INCONTINENCE, UNSPECIFIED TYPE: ICD-10-CM

## 2019-12-14 DIAGNOSIS — I27.29 PULMONARY HYPERTENSION ASSOCIATED WITH SYSTEMIC DISORDER: Chronic | ICD-10-CM

## 2019-12-14 DIAGNOSIS — I10 ESSENTIAL HYPERTENSION: ICD-10-CM

## 2019-12-14 DIAGNOSIS — M34.89 CUTANEOUS SCLERODERMA: ICD-10-CM

## 2019-12-14 DIAGNOSIS — L97.529 SKIN ULCERS OF BOTH FEET: ICD-10-CM

## 2019-12-14 DIAGNOSIS — J84.9 ILD (INTERSTITIAL LUNG DISEASE): ICD-10-CM

## 2019-12-14 DIAGNOSIS — I78.1 TELANGIECTASIA: ICD-10-CM

## 2019-12-14 DIAGNOSIS — J20.8 ACUTE BACTERIAL BRONCHITIS: Primary | ICD-10-CM

## 2019-12-14 DIAGNOSIS — M85.89 OSTEOPENIA OF MULTIPLE SITES: ICD-10-CM

## 2019-12-14 DIAGNOSIS — E55.9 VITAMIN D DEFICIENCY: ICD-10-CM

## 2019-12-14 DIAGNOSIS — G89.29 OTHER CHRONIC PAIN: Chronic | ICD-10-CM

## 2019-12-14 DIAGNOSIS — I87.2 VENOUS INSUFFICIENCY OF BOTH LOWER EXTREMITIES: ICD-10-CM

## 2019-12-14 DIAGNOSIS — I73.00 RAYNAUD'S DISEASE WITHOUT GANGRENE: ICD-10-CM

## 2019-12-14 DIAGNOSIS — R15.9 INCONTINENCE OF FECES, UNSPECIFIED FECAL INCONTINENCE TYPE: ICD-10-CM

## 2019-12-14 DIAGNOSIS — L98.499 ARTERIAL INSUFFICIENCY WITH ISCHEMIC ULCER: ICD-10-CM

## 2019-12-14 DIAGNOSIS — L97.519 SKIN ULCERS OF BOTH FEET: ICD-10-CM

## 2019-12-14 PROCEDURE — 96372 THER/PROPH/DIAG INJ SC/IM: CPT | Mod: PBBFAC

## 2019-12-14 PROCEDURE — 99999 PR PBB SHADOW E&M-EST. PATIENT-LVL III: ICD-10-PCS | Mod: PBBFAC,,, | Performed by: FAMILY MEDICINE

## 2019-12-14 PROCEDURE — 1125F PR PAIN SEVERITY QUANTIFIED, PAIN PRESENT: ICD-10-PCS | Mod: ,,, | Performed by: FAMILY MEDICINE

## 2019-12-14 PROCEDURE — 99213 PR OFFICE/OUTPT VISIT, EST, LEVL III, 20-29 MIN: ICD-10-PCS | Mod: S$PBB,,, | Performed by: FAMILY MEDICINE

## 2019-12-14 PROCEDURE — 99213 OFFICE O/P EST LOW 20 MIN: CPT | Mod: PBBFAC,25 | Performed by: FAMILY MEDICINE

## 2019-12-14 PROCEDURE — 1159F MED LIST DOCD IN RCRD: CPT | Mod: ,,, | Performed by: FAMILY MEDICINE

## 2019-12-14 PROCEDURE — 1159F PR MEDICATION LIST DOCUMENTED IN MEDICAL RECORD: ICD-10-PCS | Mod: ,,, | Performed by: FAMILY MEDICINE

## 2019-12-14 PROCEDURE — 1125F AMNT PAIN NOTED PAIN PRSNT: CPT | Mod: ,,, | Performed by: FAMILY MEDICINE

## 2019-12-14 PROCEDURE — 99999 PR PBB SHADOW E&M-EST. PATIENT-LVL III: CPT | Mod: PBBFAC,,, | Performed by: FAMILY MEDICINE

## 2019-12-14 PROCEDURE — 99213 OFFICE O/P EST LOW 20 MIN: CPT | Mod: S$PBB,,, | Performed by: FAMILY MEDICINE

## 2019-12-14 RX ORDER — FLUCONAZOLE 150 MG/1
150 TABLET ORAL DAILY
Qty: 1 TABLET | Refills: 0 | Status: SHIPPED | OUTPATIENT
Start: 2019-12-14 | End: 2019-12-15

## 2019-12-14 RX ORDER — ALPRAZOLAM 0.25 MG/1
0.25 TABLET ORAL 3 TIMES DAILY PRN
Qty: 15 TABLET | Refills: 0 | Status: SHIPPED | OUTPATIENT
Start: 2019-12-14 | End: 2020-01-17

## 2019-12-14 RX ORDER — PROMETHAZINE HYDROCHLORIDE AND DEXTROMETHORPHAN HYDROBROMIDE 6.25; 15 MG/5ML; MG/5ML
5 SYRUP ORAL NIGHTLY PRN
Qty: 118 ML | Refills: 0 | Status: SHIPPED | OUTPATIENT
Start: 2019-12-14 | End: 2019-12-24

## 2019-12-14 RX ORDER — TRIAMCINOLONE ACETONIDE 40 MG/ML
40 INJECTION, SUSPENSION INTRA-ARTICULAR; INTRAMUSCULAR
Status: COMPLETED | OUTPATIENT
Start: 2019-12-14 | End: 2019-12-14

## 2019-12-14 RX ORDER — LEVOFLOXACIN 500 MG/1
500 TABLET, FILM COATED ORAL DAILY
Qty: 10 TABLET | Refills: 0 | Status: SHIPPED | OUTPATIENT
Start: 2019-12-14 | End: 2020-01-17

## 2019-12-14 RX ORDER — METHYLPREDNISOLONE 4 MG/1
TABLET ORAL
Qty: 1 PACKAGE | Refills: 0 | Status: SHIPPED | OUTPATIENT
Start: 2019-12-14 | End: 2020-01-17

## 2019-12-14 RX ADMIN — TRIAMCINOLONE ACETONIDE 40 MG: 40 INJECTION, SUSPENSION INTRA-ARTICULAR; INTRAMUSCULAR at 09:12

## 2019-12-14 NOTE — PROGRESS NOTES
Subjective:   Patient ID: Yamel Shah is a 69 y.o. female.    Chief Complaint: Follow-up; Cough; Nasal Congestion; Wheezing; and Sore Throat (pain rate 3)      Cough   This is a new problem. The current episode started 1 to 4 weeks ago. The problem has been rapidly worsening. The cough is productive of sputum and productive of purulent sputum. Associated symptoms include chills, ear pain, a fever, headaches, nasal congestion and postnasal drip. Pertinent negatives include no chest pain, rhinorrhea, sore throat or shortness of breath. The symptoms are aggravated by dust and exercise. She has tried nothing for the symptoms. The treatment provided no relief. There is no history of asthma.       Patient queried and denies any further complaints.    LOCATION  DURATION  SEVERITY  QUALITY  TIMING  CAUSE  ASSOCIATED SYMPTOMS  MODIFIERS.    ALLERGIES AND MEDICATIONS: updated and reviewed.  Review of patient's allergies indicates:   Allergen Reactions    Sulfa (sulfonamide antibiotics)      Other reaction(s): Rash       Current Outpatient Medications:     albuterol (VENTOLIN HFA) 90 mcg/actuation inhaler, Inhale 2 puffs into the lungs every 4 (four) hours as needed for Wheezing. Rescue (Patient not taking: Reported on 12/6/2019), Disp: 18 g, Rfl: 1    ALPRAZolam (XANAX) 0.25 MG tablet, Take 1 tablet (0.25 mg total) by mouth 3 (three) times daily as needed for Anxiety. Do not take regularly, Disp: 15 tablet, Rfl: 0    amoxicillin-clavulanate (AUGMENTIN) 600-42.9 mg/5 mL SusR, Take 7.5 mLs (900 mg total) by mouth every 12 (twelve) hours. for 10 days, Disp: 150 mL, Rfl: 0    b complex vitamins tablet, Take 1 tablet by mouth once daily., Disp: , Rfl:     carboxymethylcellulose sodium (REFRESH TEARS) 0.5 % Drop, Apply 1-2 drops to every as needed (Patient not taking: Reported on 12/8/2019), Disp: , Rfl:     conjugated estrogens (PREMARIN) vaginal cream, Place 1 g vaginally twice a week. Place pea sized amount to  vagina twice per day for two weeks then twice per week therafter, Disp: 30 g, Rfl: 12    cyclobenzaprine (FLEXERIL) 5 MG tablet, Take 1 tablet (5 mg total) by mouth nightly. for 10 days, Disp: 10 tablet, Rfl: 0    ergocalciferol (ERGOCALCIFEROL) 50,000 unit Cap, Take 1 capsule (50,000 Units total) by mouth every 7 days., Disp: 12 capsule, Rfl: 1    famotidine (PEPCID) 20 MG tablet, Take 1 tablet (20 mg total) by mouth 2 (two) times daily. (Patient not taking: Reported on 12/8/2019), Disp: 60 tablet, Rfl: 11    ferrous sulfate 325 (65 FE) MG EC tablet, Take 1 tablet (325 mg total) by mouth 3 (three) times daily. (Patient not taking: Reported on 12/6/2019), Disp: , Rfl: 0    fluconazole (DIFLUCAN) 150 MG Tab, Take 1 tablet (150 mg total) by mouth once daily. Prn y east vaginitis for 1 day, Disp: 1 tablet, Rfl: 0    fluoride, sodium, (PREVIDENT 5000) 1.1 % Pste, , Disp: , Rfl:     guaifenesin-codeine 100-10 mg/5 ml (TUSSI-ORGANIDIN NR)  mg/5 mL syrup, Take 10 mLs by mouth every 6 (six) hours as needed for Cough., Disp: 120 mL, Rfl: 0    levoFLOXacin (LEVAQUIN) 500 MG tablet, Take 1 tablet (500 mg total) by mouth once daily., Disp: 10 tablet, Rfl: 0    methylPREDNISolone (MEDROL DOSEPACK) 4 mg tablet, use as directed, Disp: 1 Package, Rfl: 0    multivitamin capsule, Take 1 capsule by mouth once daily. , Disp: , Rfl:     mycophenolate mofetil (CELLCEPT) 200 mg/mL SusR, Take 5 mLs (1,000 mg total) by mouth 2 (two) times daily., Disp: 480 mL, Rfl: 1    NIFEdipine (ADALAT CC) 90 MG TbSR, TAKE 1 TABLET(90 MG) BY MOUTH EVERY DAY, Disp: 90 tablet, Rfl: 3    omeprazole (PRILOSEC) 40 MG capsule, Take 1 capsule (40 mg total) by mouth 2 (two) times daily before meals., Disp: 180 capsule, Rfl: 3    pravastatin (PRAVACHOL) 20 MG tablet, TAKE 1 TABLET BY MOUTH EVERY EVENING, Disp: 90 tablet, Rfl: 0    pregabalin (LYRICA) 150 MG capsule, Take 1 capsule (150 mg total) by mouth 2 (two) times daily., Disp: 60  "capsule, Rfl: 2    promethazine-dextromethorphan (PROMETHAZINE-DM) 6.25-15 mg/5 mL Syrp, Take 5 mLs by mouth nightly as needed. Do not take and drive. Do not take while working., Disp: 118 mL, Rfl: 0    spironolactone (ALDACTONE) 25 MG tablet, Take 1 tablet (25 mg total) by mouth once daily., Disp: 30 tablet, Rfl: 11    tadalafil (ADCIRCA) 20 mg Tab, Take 2 tablets (40 mg total) by mouth once daily., Disp: 180 tablet, Rfl: 3  No current facility-administered medications for this visit.     Review of Systems   Constitutional: Positive for chills and fever. Negative for fatigue.   HENT: Positive for ear pain and postnasal drip. Negative for congestion, rhinorrhea, sinus pressure and sore throat.    Respiratory: Positive for cough. Negative for shortness of breath.    Cardiovascular: Negative for chest pain, palpitations and leg swelling.   Gastrointestinal: Negative for abdominal pain, diarrhea, nausea and vomiting.   Genitourinary: Negative for dysuria.   Neurological: Positive for headaches. Negative for dizziness and light-headedness.       Objective:     Vitals:    12/14/19 0808   BP: 120/74   Pulse: 86   SpO2: 97%   Weight: 71.8 kg (158 lb 4.6 oz)   Height: 5' 5" (1.651 m)   PainSc:   3     Body mass index is 26.34 kg/m².    Physical Exam   Constitutional: She is oriented to person, place, and time. She appears well-developed and well-nourished. She is cooperative. She does not have a sickly appearance. No distress.   HENT:   Head: Normocephalic and atraumatic.   Right Ear: Hearing, tympanic membrane, external ear and ear canal normal. No tenderness.   Left Ear: Hearing, tympanic membrane, external ear and ear canal normal. No tenderness.   Nose: Nose normal.   Mouth/Throat: Oropharynx is clear and moist. Normal dentition. No oropharyngeal exudate, posterior oropharyngeal edema or posterior oropharyngeal erythema.   Eyes: Conjunctivae and lids are normal. Right eye exhibits no discharge. Left eye exhibits no " discharge. Right conjunctiva is not injected. Left conjunctiva is not injected. No scleral icterus. Right eye exhibits normal extraocular motion. Left eye exhibits normal extraocular motion.   Neck: Normal range of motion. Neck supple. No JVD present. Carotid bruit is not present. No tracheal deviation and no edema present. No thyromegaly present.   Cardiovascular: Normal rate, regular rhythm, normal heart sounds and normal pulses. Exam reveals no friction rub.   No murmur heard.  Pulmonary/Chest: Effort normal and breath sounds normal. No accessory muscle usage. No respiratory distress. She has no wheezes. She has no rhonchi. She has no rales.   Musculoskeletal: She exhibits no edema.   Lymphadenopathy:        Head (right side): No submandibular adenopathy present.        Head (left side): No submandibular adenopathy present.     She has no cervical adenopathy.   Neurological: She is alert and oriented to person, place, and time.   Skin: Skin is warm and dry. She is not diaphoretic.   Psychiatric: Her speech is normal and behavior is normal. Thought content normal. Her mood appears not anxious. Her affect is not angry, not labile and not inappropriate. She does not exhibit a depressed mood.   Nursing note and vitals reviewed.      Assessment and Plan:   Yamel was seen today for follow-up, cough, nasal congestion, wheezing and sore throat.    Diagnoses and all orders for this visit:    Acute bacterial bronchitis    Other chronic pain    Idiopathic neuropathy    Telangiectasia    Skin ulcers of both feet    Pulmonary hypertension associated with systemic disorder    ILD (interstitial lung disease)    Venous insufficiency of both lower extremities    Raynaud's disease without gangrene    Essential hypertension    Arterial insufficiency with ischemic ulcer    Urinary incontinence, unspecified type    Scleroderma    Cutaneous scleroderma    Iron deficiency anemia, unspecified iron deficiency anemia type    Vitamin D  deficiency    Incontinence of feces, unspecified fecal incontinence type    Gastroesophageal reflux disease, esophagitis presence not specified    Pharyngoesophageal dysphagia    Osteopenia of multiple sites    contracture    Other orders  -     triamcinolone acetonide injection 40 mg  -     promethazine-dextromethorphan (PROMETHAZINE-DM) 6.25-15 mg/5 mL Syrp; Take 5 mLs by mouth nightly as needed. Do not take and drive. Do not take while working.  -     methylPREDNISolone (MEDROL DOSEPACK) 4 mg tablet; use as directed  -     levoFLOXacin (LEVAQUIN) 500 MG tablet; Take 1 tablet (500 mg total) by mouth once daily.  -     ALPRAZolam (XANAX) 0.25 MG tablet; Take 1 tablet (0.25 mg total) by mouth 3 (three) times daily as needed for Anxiety. Do not take regularly  -     fluconazole (DIFLUCAN) 150 MG Tab; Take 1 tablet (150 mg total) by mouth once daily. Prn y east vaginitis for 1 day      Hydrate, rest, OTC Mucinex Expectorant as directed, Nasal saline as needed.  OTC Zyrtec as directed.    Follow up in about 2 weeks (around 12/28/2019).    THIS NOTE WILL BE SHARED WITH THE PATIENT.

## 2019-12-16 ENCOUNTER — TELEPHONE (OUTPATIENT)
Dept: GASTROENTEROLOGY | Facility: CLINIC | Age: 69
End: 2019-12-16

## 2019-12-16 ENCOUNTER — TELEPHONE (OUTPATIENT)
Dept: PAIN MEDICINE | Facility: CLINIC | Age: 69
End: 2019-12-16

## 2019-12-16 ENCOUNTER — OFFICE VISIT (OUTPATIENT)
Dept: GASTROENTEROLOGY | Facility: CLINIC | Age: 69
End: 2019-12-16
Payer: MEDICARE

## 2019-12-16 VITALS
DIASTOLIC BLOOD PRESSURE: 67 MMHG | HEIGHT: 66 IN | BODY MASS INDEX: 25.12 KG/M2 | HEART RATE: 79 BPM | SYSTOLIC BLOOD PRESSURE: 125 MMHG | WEIGHT: 156.31 LBS

## 2019-12-16 DIAGNOSIS — K21.9 GASTROESOPHAGEAL REFLUX DISEASE, ESOPHAGITIS PRESENCE NOT SPECIFIED: Primary | ICD-10-CM

## 2019-12-16 DIAGNOSIS — R13.10 DYSPHAGIA, UNSPECIFIED TYPE: ICD-10-CM

## 2019-12-16 DIAGNOSIS — R15.1 FECAL SOILING: ICD-10-CM

## 2019-12-16 DIAGNOSIS — R14.0 BLOATING: ICD-10-CM

## 2019-12-16 PROCEDURE — 99214 OFFICE O/P EST MOD 30 MIN: CPT | Mod: S$PBB,,, | Performed by: NURSE PRACTITIONER

## 2019-12-16 PROCEDURE — 99999 PR PBB SHADOW E&M-EST. PATIENT-LVL V: ICD-10-PCS | Mod: PBBFAC,,, | Performed by: NURSE PRACTITIONER

## 2019-12-16 PROCEDURE — 99214 PR OFFICE/OUTPT VISIT, EST, LEVL IV, 30-39 MIN: ICD-10-PCS | Mod: S$PBB,,, | Performed by: NURSE PRACTITIONER

## 2019-12-16 PROCEDURE — 99999 PR PBB SHADOW E&M-EST. PATIENT-LVL V: CPT | Mod: PBBFAC,,, | Performed by: NURSE PRACTITIONER

## 2019-12-16 PROCEDURE — 1159F MED LIST DOCD IN RCRD: CPT | Mod: ,,, | Performed by: NURSE PRACTITIONER

## 2019-12-16 PROCEDURE — 1159F PR MEDICATION LIST DOCUMENTED IN MEDICAL RECORD: ICD-10-PCS | Mod: ,,, | Performed by: NURSE PRACTITIONER

## 2019-12-16 PROCEDURE — 99215 OFFICE O/P EST HI 40 MIN: CPT | Mod: PBBFAC | Performed by: NURSE PRACTITIONER

## 2019-12-16 NOTE — PROGRESS NOTES
[facility] Established Patient Visit    Reason for Visit:  The primary encounter diagnosis was Gastroesophageal reflux disease, esophagitis presence not specified. Diagnoses of Dysphagia, unspecified type, Bloating, and Fecal soiling were also pertinent to this visit.    PCP: Aly Rand    HPI:This is a 69 y.o. Female with a PMH of  scleroderma, ILD, PHTN here today for the following GI symptoms:    GERD. Pyrosis. Regurgitation.   Worse x 1 month. Prev well controlled w omeprazole 40 mg daily.   No longer on zantac d/t recall. Worse with pepcid.   caffeine is a trigger.    Has had aspiration pneumonia x 4 and pulm is concerned it may be d/t reflux    Dysphagia.  Previously improved after changing to plant based diet (less meat, more fruits).   Lately occurring 3 days weekly; prev few times monthly.    Solids get stuck in mid esophagus. No problems with liquids.  Materials eventually pass after about 30 minutes.   Washing with liquids helps a little. Some improvement if she chews thoroughly.  Improvement with dilation in the past.   Has not had to go to ED for this.       Dry mouth. Uses biotin mouth wash sometimes.     History of  esophageal candida, and GEJ polyps.     Bloating  And gas. With distention. Worse with meals. Worse as the day progresses. Consumes lactose and artificial sugars.    Normal Bowel movements. 1 bristol type 4,5 BM/day.  will pass mucus almost daily.     Fecal leakage once weekly. Wears pad in underwear.     Anemia. Now with normal blood counts and HGB. No longer on PO iron once daily. EGD/colon negative.    Denies abdominal pain, diarrhea, constipation, anxiety/deperssion, insomnia, melena, BRBPR.    Total visit time was 30 minutes, more than 50% of which was spent in face-to-face counseling with patient regarding symptoms, diagnostic results, prognosis, risks and benefits of treatment options, instructions for management, stress reduction, coping strategies and  coordination of care.      Previous Studies:  MBSS 10/31/19:Flash penetration, no aspiration.  Moderate vallecular pooling, mild Piriforms sinus pooling. The results of this MBSS indicate that patient demonstrates moderate swallowing dysfunction c/b intermittent silent penetration of thin liquids as well as moderate pharyngeal residue across consistencies. No aspiration observed. Given patient's esophageal symptoms and her history of scleroderma, prognosis for improvement of swallowing with therapy is good/ fair.   Recommendations / POC   -Diet: recommend thin liquids and regular as tolerated.   -Therapeutic technique: recommend small bites/sips, alternate solid with liquid wash and multiple swallows per bolus.  -Dysphagia therapy, for 4-6 sessions over the course of 4-6 weeks, in order to increase tongue base retraction, increase pharyngeal contraction and increase swallow safety by implementing therapeutic maneuvers.   -Repeat MBSS in 6-8 weeks for reeval after completion of dysphagia therapy.  -Contact clinician or referring provider for repeat MBSS if swallowing status changes or worsens.  -Recommend follow-up with GI.     3/29/19 EGD:dilation in the entire esophagus. irreg zline (-). 3 cm HH. Benign appearing esophageal stenosis. Nl stomach. Few gastric polyps (hp). Nl duodenum (-).   3/29/19 colon:GPTTI. Five 2-5 mm TC polyps (TAs). Rpt in 3 yrs  1/26/2017 EGD Lammi: NL esophagus(-).  irrg z line (-). Nl stomach. Duodenal congestion (mild chr inflamm). Esophageal polyps (hp). Rpt 6 months to check for complete removal.   2014 EGD Dr. Gusman-GEJ polyps. bx-chronic inflammation;hyperplastic change. Repeat in 6 months for surveillance.   2014 colonoscopy Dr. Gusman - 3 mm descending colon polyp. Medium internal hemorrhoids. path- fecal matter, no human tissue present. Repeat in 5 years.  2011 EGD [facility]- HH. White nummular esophageal lesions. irregular z line. Esophageal nodule. Path- mild chronic gastritis. No  IM. Esophageal candida.  2011 colonoscopy Ch- sigmoid tics. Internal hemorrhoids. 4 mm ascending colon polyp. Path-hyperplastic polyp. repeat in 5 years.   2009 EGD Ch- Schatzki ring, esophogeal nodule, HH, irregular z line. Path-chronic gastritis  2007 colonoscopy Girgrah- WNL  2007 EGD Girgrah- irregular z line. Path-chronic inflammation. No IM.      ROS:  Constitutional: No fevers, no chills, No unintentional weight loss, + improving fatigue,   ENT: No allergies  CV: No chest pain, no palpitations, +  perif. edema, no sob on exertion  Pulm: No cough, no shortness of breath, + wheezes, no sputum  Ophtho: No vision changes  GI: see HPI; also no nausea, no vomiting, no change in appetite  Derm: No rash  Heme: No lymphadenopathy, No bruising  MSK: + arthritis, no muscle pain, no muscle weakness  : No dysuria, No hematuria  Endo: No hot or cold intolerance  Neuro: No syncope, No seizure,     PMHX:  has a past medical history of Abnormal Pap smear, Acid reflux, Allergy, Arthritis, GERD (gastroesophageal reflux disease), History of migraine headaches, Hypertension, Idiopathic neuropathy (7/20/2012), ILD (interstitial lung disease) (11/6/2013), Iron deficiency anemia (3/18/2014), MRSA carrier, Osteopenia, Pulmonary fibrosis, Pulmonary hypertension, Raynaud's disease, Scleroderma, diffuse, Sjogren's syndrome, and Vitamin D deficiency (11/14/2013).    PSHX:  has a past surgical history that includes Dilation and curettage of uterus; Breast biopsy; Cervical conization w/ laser (1970); Hysterectomy (1990); Esophagogastroduodenoscopy; Colonoscopy; Esophagogastroduodenoscopy (N/A, 3/29/2019); and Colonoscopy (N/A, 3/29/2019).    The patient's social and family histories were reviewed by me and updated in the appropriate section of the electronic medical record.    Review of patient's allergies indicates:   Allergen Reactions    Sulfa (sulfonamide antibiotics)      Other reaction(s): Rash       Current Outpatient  Medications   Medication Sig    albuterol (VENTOLIN HFA) 90 mcg/actuation inhaler Inhale 2 puffs into the lungs every 4 (four) hours as needed for Wheezing. Rescue    ALPRAZolam (XANAX) 0.25 MG tablet Take 1 tablet (0.25 mg total) by mouth 3 (three) times daily as needed for Anxiety. Do not take regularly    b complex vitamins tablet Take 1 tablet by mouth once daily.    carboxymethylcellulose sodium (REFRESH TEARS) 0.5 % Drop Apply 1-2 drops to every as needed    conjugated estrogens (PREMARIN) vaginal cream Place 1 g vaginally twice a week. Place pea sized amount to vagina twice per day for two weeks then twice per week therafter    ergocalciferol (ERGOCALCIFEROL) 50,000 unit Cap Take 1 capsule (50,000 Units total) by mouth every 7 days.    famotidine (PEPCID) 20 MG tablet Take 1 tablet (20 mg total) by mouth 2 (two) times daily.    ferrous sulfate 325 (65 FE) MG EC tablet Take 1 tablet (325 mg total) by mouth 3 (three) times daily.    fluoride, sodium, (PREVIDENT 5000) 1.1 % Pste     guaifenesin-codeine 100-10 mg/5 ml (TUSSI-ORGANIDIN NR)  mg/5 mL syrup Take 10 mLs by mouth every 6 (six) hours as needed for Cough.    levoFLOXacin (LEVAQUIN) 500 MG tablet Take 1 tablet (500 mg total) by mouth once daily.    methylPREDNISolone (MEDROL DOSEPACK) 4 mg tablet use as directed    multivitamin capsule Take 1 capsule by mouth once daily.     mycophenolate mofetil (CELLCEPT) 200 mg/mL SusR Take 5 mLs (1,000 mg total) by mouth 2 (two) times daily.    NIFEdipine (ADALAT CC) 90 MG TbSR TAKE 1 TABLET(90 MG) BY MOUTH EVERY DAY    omeprazole (PRILOSEC) 40 MG capsule Take 1 capsule (40 mg total) by mouth 2 (two) times daily before meals.    pravastatin (PRAVACHOL) 20 MG tablet TAKE 1 TABLET BY MOUTH EVERY EVENING    pregabalin (LYRICA) 150 MG capsule Take 1 capsule (150 mg total) by mouth 2 (two) times daily.    promethazine-dextromethorphan (PROMETHAZINE-DM) 6.25-15 mg/5 mL Syrp Take 5 mLs by mouth  "nightly as needed. Do not take and drive. Do not take while working.    spironolactone (ALDACTONE) 25 MG tablet Take 1 tablet (25 mg total) by mouth once daily.    tadalafil (ADCIRCA) 20 mg Tab Take 2 tablets (40 mg total) by mouth once daily.    amoxicillin-clavulanate (AUGMENTIN) 600-42.9 mg/5 mL SusR Take 7.5 mLs (900 mg total) by mouth every 12 (twelve) hours. for 10 days (Patient not taking: Reported on 12/16/2019)     No current facility-administered medications for this visit.          Objective Findings:    Vital Signs:  /67   Pulse 79   Ht 5' 6" (1.676 m)   Wt 70.9 kg (156 lb 4.9 oz)   BMI 25.23 kg/m²  Body mass index is 25.23 kg/m².    Physical Exam:   General appearance: alert, cooperative, no distress. + evidence of systemic sclerosis w  narrow oral aperture.   HENT: Normocephalic, atraumatic, neck symmetrical, no nasal discharge  Eyes: conjunctivae/corneas clear,EOM's intact  Lungs: clear to auscultation bilaterally,   Heart: regular rate and rhythm without rub;  Abdomen: soft, non-tender; bowel sounds normoactive; no organomegaly  Extremities: extremities symmetric; no clubbing, cyanosis, or edema, no raynauds.   +sclerodactyly, osteolysis of digits, digital ulcers.  Integument: Skin color, texture, turgor normal; no rashes; hair distrubution normal,  +telangectasia, and tightness of skin.    Neurologic: Alert and oriented X 3, normal strength, normal coordination and gait  Psychiatric: no pressured speech; normal affect; no evidence of impaired cognition        Labs:  Lab Results   Component Value Date    WBC 3.80 (L) 11/15/2019    HGB 13.1 11/15/2019    HCT 41.3 11/15/2019     11/15/2019    CHOL 125 05/10/2019    TRIG 87 05/10/2019    HDL 43 05/10/2019    ALT 10 11/15/2019    AST 17 11/15/2019     11/15/2019    K 4.1 11/15/2019     11/15/2019    CREATININE 0.7 11/15/2019    BUN 18 11/15/2019    CO2 24 11/15/2019    TSH 1.487 05/10/2019    INR 1.0 06/29/2015 "         Assessment and plan:  This is a 69 y.o. Female with a PMH of  scleroderma, ILD, PHTN here today for the following GI symptoms:    GERD.  Prev well controlled w omeprazole 40 mg daily. Has had aspiration pneumonia x 4 and pulmonolgy/rheumatology is concerned it may be due to reflux  No longer on zantac due to recall.   Worse with pepcid.   -Continue omeprazole 40 mg BID  -Start gaviscon w alginate PRN.  -Esophageal manometry with pH impedance off PPI      Dysphagia. MBSS with moderate swallowing dysfunction c/b intermittent silent penetration of thin liquids as well as moderate pharyngeal residue across consistencies. No aspiration observed. Speech therapy recommended.   Previously improved after changing to plant based diet (less meat, more fruits).   Washing with liquids helps a little. Some improvement if she chews thoroughly.  Improvement with dilation in the past.   -Esophageal manometry    Dry mouth. Uses biotin mouth wash sometimes.     History of  esophageal candida, and GEJ polyps.     Gas and bloating. distention.   -Eliminate lactose and artificial sugars.    Normal Bowel movements. 1 bristol type 4,5 BM/day.  will pass mucus almost daily.     Fecal leakage once weekly. Wears pad in underwear.     Anemia. Now with normal blood counts and HGB. No longer on PO iron once daily. EGD/colon negative.    Follow up in about 3 months (around 3/16/2020) for Motility w Dr. Mendoza.      1. Gastroesophageal reflux disease, esophagitis presence not specified    2. Dysphagia, unspecified type    3. Bloating    4. Fecal soiling        Orders Placed This Encounter   Procedures    Case request GI: MANOMETRY, ESOPHAGUS, WITH IMPEDANCE MEASUREMENT, IMPEDANCE PH STUDY, ESOPHAGEAL, 24 HOUR, IN PATIENT NOT TAKING ACID REDUCING MEDICATION     Order Specific Question:   Medical Necessity:     Answer:   Medically Urgent [101]     Order Specific Question:   CPT Code:     Answer:   AK ESOPHAGEAL MOTILITY STUDY, MANOMETRY W  3-D PRESSURE TOPOGRAPHY [0240T]     Order Specific Question:   CPT Code:     Answer:   MO GERD TST W/ NASAL PH ELECTROD [47632]     Order Specific Question:   Case Referring Provider     Answer:   DIANE FERRER [[id number]]         Thank you for allowing me to participate in the care of JACINTA Aguilera, FNP-C

## 2019-12-16 NOTE — TELEPHONE ENCOUNTER
MOTILITY CLINIC PROCEDURE ORDERS    CLEARANCE FOR PROCEDURES:  Not needed     PROCEDURES  Esophageal manometry with impedance - dysphagia   pH Impedance 24 hours     FLOOR:  4th Floor     PREP  Standard Prep    MEDICATIONS   pH Studies    OFF PPI/H2 Blocker     Motility Studies (esophageal manometry/anorectal manometry)  Hold Narcotics x 1 days   Hold TCA x 1 days  Propofol only. No fentanyl or benzodiazepine during sedation. If additional sedation needed, discuss with Dr. Mendoza.    ORDER OF TESTING:    No special requirements - pt lives locally

## 2019-12-16 NOTE — LETTER
December 17, 2019      Aly Rand MD  [address] [place] [address]           [facility]  [address]  [place] [address]  Phone: [phone]  Fax: [phone]          Patient: Yamel Shah   MR Number: [id number]   YOB: 1950   Date of Visit: 12/16/2019       Dear Dr. Aly Rand:    Thank you for referring Yamel Shah to me for evaluation. Attached you will find relevant portions of my assessment and plan of care.    If you have questions, please do not hesitate to call me. I look forward to following Yamel Shah along with you.    Sincerely,    Jeannette Pringle, SEAN    Enclosure  CC:  No Recipients    If you would like to receive this communication electronically, please contact [email or web address] or [phone] to request more information on [facility] Link access.    For providers and/or their staff who would like to refer a patient to [facility], please contact us through our one-stop-shop provider referral line, [facility] , at [phone].    If you feel you have received this communication in error or would no longer like to receive these types of communications, please e-mail [email or web address]

## 2019-12-16 NOTE — PATIENT INSTRUCTIONS
I have ordered an esophageal manometry with pH acid testing for further evaluation.    Continue omeprazole 40 mg twice daily for acid reflux. Also take over the counter gaviscon with algintate 1-4 times daily as needed for reflux. Take this in addition to your routine reflux medications. You may have to order it online (ie amazon.com).    Eliminate all forms of dairy/lactose (milk, cheese, butter, creamers, ice cream etc) and artificial sweeteners (splenda, equal, stevia, truvia, crystal lite, diet sodas, sugar free candy/gum etc) from your diet for 14 days to see if gas and bloating improve.

## 2019-12-17 ENCOUNTER — OFFICE VISIT (OUTPATIENT)
Dept: PAIN MEDICINE | Facility: CLINIC | Age: 69
End: 2019-12-17
Payer: MEDICARE

## 2019-12-17 VITALS
BODY MASS INDEX: 26 KG/M2 | HEART RATE: 61 BPM | SYSTOLIC BLOOD PRESSURE: 157 MMHG | RESPIRATION RATE: 18 BRPM | OXYGEN SATURATION: 100 % | TEMPERATURE: 98 F | DIASTOLIC BLOOD PRESSURE: 76 MMHG | WEIGHT: 156.06 LBS | HEIGHT: 65 IN

## 2019-12-17 DIAGNOSIS — G60.9 IDIOPATHIC NEUROPATHY: ICD-10-CM

## 2019-12-17 DIAGNOSIS — G89.4 CHRONIC PAIN DISORDER: Primary | ICD-10-CM

## 2019-12-17 DIAGNOSIS — M34.89 CUTANEOUS SCLERODERMA: ICD-10-CM

## 2019-12-17 PROCEDURE — 99999 PR PBB SHADOW E&M-EST. PATIENT-LVL IV: ICD-10-PCS | Mod: PBBFAC,,, | Performed by: NURSE PRACTITIONER

## 2019-12-17 PROCEDURE — 1159F PR MEDICATION LIST DOCUMENTED IN MEDICAL RECORD: ICD-10-PCS | Mod: ,,, | Performed by: NURSE PRACTITIONER

## 2019-12-17 PROCEDURE — 1125F PR PAIN SEVERITY QUANTIFIED, PAIN PRESENT: ICD-10-PCS | Mod: ,,, | Performed by: NURSE PRACTITIONER

## 2019-12-17 PROCEDURE — 99213 PR OFFICE/OUTPT VISIT, EST, LEVL III, 20-29 MIN: ICD-10-PCS | Mod: S$PBB,,, | Performed by: NURSE PRACTITIONER

## 2019-12-17 PROCEDURE — 1125F AMNT PAIN NOTED PAIN PRSNT: CPT | Mod: ,,, | Performed by: NURSE PRACTITIONER

## 2019-12-17 PROCEDURE — 1159F MED LIST DOCD IN RCRD: CPT | Mod: ,,, | Performed by: NURSE PRACTITIONER

## 2019-12-17 PROCEDURE — 99213 OFFICE O/P EST LOW 20 MIN: CPT | Mod: S$PBB,,, | Performed by: NURSE PRACTITIONER

## 2019-12-17 PROCEDURE — 99999 PR PBB SHADOW E&M-EST. PATIENT-LVL IV: CPT | Mod: PBBFAC,,, | Performed by: NURSE PRACTITIONER

## 2019-12-17 PROCEDURE — 99214 OFFICE O/P EST MOD 30 MIN: CPT | Mod: PBBFAC | Performed by: NURSE PRACTITIONER

## 2019-12-17 NOTE — PROGRESS NOTES
Chronic Pain - Follow Up    Referring Physician: No ref. provider found    Chief Complaint:   Chief Complaint   Patient presents with    Follow-up     3 months        SUBJECTIVE: Disclaimer: This note has been generated using voice-recognition software. There may be typographical errors that have been missed during proof-reading    Interval History 12/17/2019:  The patient returns to clinic today for follow up. She reports improving foot pain. She reports improved healing ulcers to her left foot. She continues to report right foot pain that is burning and tingling in nature. She continues to participate in a home wound care routine. She continues to take Lyrica. She asks about decreasing this. She denies any other health changes. Her pain today is 5/10.    Interval History 9/17/2019:  The patient returns to clinic today for follow up. She continues to report bilateral foot pain that is burning and tingling in nature. Her pain is worse with sitting and at night. She continues to have slow healing ulcers to both feet. She continues to perform a home wound care program. She is no longer taking Gabapentin which was previously called in. She is now taking Pregabalin generic with benefit. She denies any other health changes. Her pain today is 4/10.    Interval History 6/13/2019:  The patient returns to clinic for follow up for bilateral foot pain. She continues to report bilateral foot pain that is burning in nature. She continues to have slow healing wounds to both feet from ulcers. She continues to take Lyrica once daily but reports increased pain. She continues to take Vitamin B12. She denies any other health changes. Her pain today is 8/10.    Interval History 3/13/2019:  The patient returns to clinic today for follow up. She continues to report bilateral foot pain that is burning and tingling in nature. Her pain is worse with prolonged walking and standing. She continues to have bilateral foot wounds. She reports  that these wounds have significantly improved since last visit. She is currently taking Vitamin B12 with benefit. She continues to report benefit with Lyrica. She is currently taking this once daily. She denies any other health changes. Her pain today is 5/10.    Interval History 2/6/2019:  The patient returns to clinic today for follow up. She reports that her bilateral foot wounds have significantly improved since last visit. She does report some significant improvement in her foot pain. She reports intermittent bilateral foot pain especially with prolonged walking. She describes this pain as heavy and tingling in nature. She is no longer taking Celebrex. She is currently taking Lyrica 150 mg BID with benefit. She does report that the Lyrica is expensive for her. She denies any other health changes. Her pain today is 5/10.    Interval History 12/5/18:  Patient returns to clinic today for follow up. She reports that since increasing her medications, she is having significant improvement in pain during the day time. She is currently taking Lyrica 75mg TID and Celebrex 200mg TID. However, she states that the burning  And tingling pain in both her feet increase in the evening around 6 or 7pm. She is unable to sleep during the nights due to the pain. She is still wearing her bilateral boots due to non healing ulcers from her scleroderma.     Interval History 8/30/2018:  The patient returns to clinic today for follow up. She continues to report bilateral foot pain that is burning and tingling in nature. She reports that this pain is worse at night. She is currently taking Lyrica 75 mg BID with limited relief. She did not have any benefit with Mobic. She continues to take Aleve with some benefit. She continues to have nonhealing ulcers. She wears an ACE bandage to her right calf, as well as boots to her bilateral feet. She denies any other health changes. Her pain today is 7/10.     Interval History 7/11/2018:  Since  previous encounter she has weaned from gabapentin to 600mg / day and did not notice significant worsening of pain, but was having somnelence from higher doses of the medication in the past.  We are weaning in an attempt to trial Lyrica as an alternative to gabapentin.  Tramadol causes significant sedation, limiting its use.  She has discontinued the use of the tramadol.  Additionally she does have benefit from anti-inflammatory medication, has been using aleve, has not trialed CANTRELL-2 inhibitors in the past.    Interval history 05/16/2018:      The patient has had x-rays of the lumbar spine which did not reveal any evidence of significant neuroforaminal stenosis with degenerative disc disease.  There is mild facet arthropathy.  She has escalated her gabapentin and is currently taking 2100 mg of gabapentin per day without any noticeable improvement but daytime somnolence.  She continues to have nonhealing ulcers and topical pain cream is helping to a limited degree over her leg in areas where there is no skin disruption.    Initial encounter:    Yamel Shah presents to the clinic for the evaluation of foot pain. The pain started 2 years ago following ulcers and symptoms have been worsening.    Brief history: History of scleroderma    Pain Description:    The pain is located in the both feet in the area of slowly healing chronic foot ulcers which were partly from venous insufficiency and stasis associated with her scleroderma.  In her right lower extremity she describes radicular symptoms in the L4/5 distribution.    At BEST  5/10     At WORST  10/10 on the WORST day.      On average pain is rated as 8/10.     Today the pain is rated as 8/10    The pain is described as burning, sharp, shooting and constant      Symptoms interfere with daily activity, sleeping and work.     Exacerbating factors: Laying, Walking, Night Time, Morning and Getting out of bed/chair.      Mitigating factors medications.     Patient  denies night fever/night sweats, urinary incontinence, bowel incontinence, significant weight loss, significant motor weakness and loss of sensations.  Patient denies any suicidal or homicidal ideations    Pain Medications:  Current:  Lyrica 150 mg daily    Tried in Past:  NSAIDs -Aleve, Mobic, Celebrex  TCA -Never  SNRI -Never  Anti-convulsants - Gabapentin, Lyrica  Muscle Relaxants -Never  Opioids-Tramadol    Physical Therapy/Home Exercise: no       report:  Reviewed and consistent with medication use as prescribed.    Pain Procedures: none    Chiropractor -never  Acupuncture - never  TENS unit -never  Spinal decompression -never  Joint replacement -never    Imaging:   X-ray lumbar spine 6/1/2016:  2 views: Alignment is normal. There is mild DJD. No fracture dislocation bone destruction seen.   Impression      Mild DJD.     Xray lumbar spine 4/2018:  COMPARISON:  June 2016    FINDINGS:  There is slight curvature of the lumbar spine.  The vertebral bodies are normally aligned and normal in height.  Mild disc space narrowing present at L5-S1.  There is mild facet degenerative change in the lower spine.  No significant osteophytic spurring present.  There is no change in alignment with flexion or extension.      Past Medical History:   Diagnosis Date    Abnormal Pap smear     Acid reflux     Allergy     Arthritis     GERD (gastroesophageal reflux disease)     History of migraine headaches     Hypertension     Idiopathic neuropathy 7/20/2012    ILD (interstitial lung disease) 11/6/2013    Iron deficiency anemia 3/18/2014    MRSA carrier     Osteopenia     Pulmonary fibrosis     Pulmonary hypertension     Raynaud's disease     Scleroderma, diffuse     Sjogren's syndrome     Vitamin D deficiency 11/14/2013     Past Surgical History:   Procedure Laterality Date    BREAST BIOPSY      Left, benign    CERVICAL CONIZATION   W/ LASER  1970    COLONOSCOPY      COLONOSCOPY N/A 3/29/2019    Procedure:  COLONOSCOPY;  Surgeon: Annamaria Mendoza MD;  Location: [facility] ([address] [place] [address]);  Service: Endoscopy;  Laterality: N/A;    DILATION AND CURETTAGE OF UTERUS      ESOPHAGOGASTRODUODENOSCOPY      ESOPHAGOGASTRODUODENOSCOPY N/A 3/29/2019    Procedure: EGD (ESOPHAGOGASTRODUODENOSCOPY);  Surgeon: Annamaria Mendoza MD;  Location: [facility] ([address] [place] [address]);  Service: Endoscopy;  Laterality: N/A;  pulmonary htn    HYSTERECTOMY  1990    FRANDY (AUB, Fibroids), ovaries remain     Social History     Socioeconomic History    Marital status:      Spouse name: Lewis    Number of children: 4    Years of education: Not on file    Highest education level: Not on file   Occupational History    Occupation: -retired     Employer: [employer]     Comment: [employer] [address]     Employer: RETIRED   Social Needs    Financial resource strain: Not very hard    Food insecurity:     Worry: Sometimes true     Inability: Never true    Transportation needs:     Medical: No     Non-medical: No   Tobacco Use    Smoking status: Never Smoker    Smokeless tobacco: Never Used   Substance and Sexual Activity    Alcohol use: Yes     Alcohol/week: 0.0 standard drinks     Frequency: Never     Comment: ocasionally    Drug use: No    Sexual activity: Yes     Partners: Male     Birth control/protection: None   Lifestyle    Physical activity:     Days per week: Not on file     Minutes per session: Not on file    Stress: Not on file   Relationships    Social connections:     Talks on phone: More than three times a week     Gets together: Once a week     Attends [employer] service: More than 4 times per year     Active member of club or organization: No     Attends meetings of clubs or organizations: Never     Relationship status:    Other Topics Concern    Are you pregnant or think you may be? No    Breast-feeding No   Social History Narrative         Family History   Problem Relation Age of Onset    Breast cancer Mother      No Known Problems Daughter     No Known Problems Son     No Known Problems Daughter     No Known Problems Son     Breast cancer Sister     Breast cancer Maternal Aunt     Osteoarthritis Brother     Melanoma Neg Hx     Colon cancer Neg Hx     Crohn's disease Neg Hx     Stomach cancer Neg Hx     Ulcerative colitis Neg Hx     Rectal cancer Neg Hx     Irritable bowel syndrome Neg Hx     Esophageal cancer Neg Hx     Celiac disease Neg Hx     Ovarian cancer Neg Hx        Review of patient's allergies indicates:   Allergen Reactions    Sulfa (sulfonamide antibiotics)      Other reaction(s): Rash       Current Outpatient Medications   Medication Sig    albuterol (VENTOLIN HFA) 90 mcg/actuation inhaler Inhale 2 puffs into the lungs every 4 (four) hours as needed for Wheezing. Rescue    ALPRAZolam (XANAX) 0.25 MG tablet Take 1 tablet (0.25 mg total) by mouth 3 (three) times daily as needed for Anxiety. Do not take regularly    amoxicillin-clavulanate (AUGMENTIN) 600-42.9 mg/5 mL SusR Take 7.5 mLs (900 mg total) by mouth every 12 (twelve) hours. for 10 days (Patient not taking: Reported on 12/16/2019)    b complex vitamins tablet Take 1 tablet by mouth once daily.    carboxymethylcellulose sodium (REFRESH TEARS) 0.5 % Drop Apply 1-2 drops to every as needed    conjugated estrogens (PREMARIN) vaginal cream Place 1 g vaginally twice a week. Place pea sized amount to vagina twice per day for two weeks then twice per week therafter    ergocalciferol (ERGOCALCIFEROL) 50,000 unit Cap Take 1 capsule (50,000 Units total) by mouth every 7 days.    famotidine (PEPCID) 20 MG tablet Take 1 tablet (20 mg total) by mouth 2 (two) times daily.    ferrous sulfate 325 (65 FE) MG EC tablet Take 1 tablet (325 mg total) by mouth 3 (three) times daily.    fluoride, sodium, (PREVIDENT 5000) 1.1 % Pste     guaifenesin-codeine 100-10 mg/5 ml (TUSSI-ORGANIDIN NR)  mg/5 mL syrup Take 10 mLs by mouth every 6 (six)  hours as needed for Cough.    levoFLOXacin (LEVAQUIN) 500 MG tablet Take 1 tablet (500 mg total) by mouth once daily.    methylPREDNISolone (MEDROL DOSEPACK) 4 mg tablet use as directed    multivitamin capsule Take 1 capsule by mouth once daily.     mycophenolate mofetil (CELLCEPT) 200 mg/mL SusR Take 5 mLs (1,000 mg total) by mouth 2 (two) times daily.    NIFEdipine (ADALAT CC) 90 MG TbSR TAKE 1 TABLET(90 MG) BY MOUTH EVERY DAY    omeprazole (PRILOSEC) 40 MG capsule Take 1 capsule (40 mg total) by mouth 2 (two) times daily before meals.    pravastatin (PRAVACHOL) 20 MG tablet TAKE 1 TABLET BY MOUTH EVERY EVENING    pregabalin (LYRICA) 150 MG capsule Take 1 capsule (150 mg total) by mouth 2 (two) times daily.    promethazine-dextromethorphan (PROMETHAZINE-DM) 6.25-15 mg/5 mL Syrp Take 5 mLs by mouth nightly as needed. Do not take and drive. Do not take while working.    spironolactone (ALDACTONE) 25 MG tablet Take 1 tablet (25 mg total) by mouth once daily.    tadalafil (ADCIRCA) 20 mg Tab Take 2 tablets (40 mg total) by mouth once daily.     No current facility-administered medications for this visit.        REVIEW OF SYSTEMS:    GENERAL:  No weight loss, malaise or fevers.  HEENT:   No recent changes in vision or hearing  NECK:  Negative for lumps,  difficulty with swallowing associated with scleroderma.  RESPIRATORY:  Negative for cough, wheezing or shortness of breath, patient denies any recent URI. Albuterol (history of ILD)  CARDIOVASCULAR:  Negative for chest pain, leg swelling or palpitations.  GI:  Negative for abdominal discomfort, blood in stools or black stools or change in bowel habits. GERD controlled zantac  MUSCULOSKELETAL:  See HPI.  SKIN:   Chronic low healing venous stasis ulcerations to bilateral lower extremities   PSYCH:  No mood disorder or recent psychosocial stressors.  Patients sleep is not disturbed secondary to pain.  HEMATOLOGY/LYMPHOLOGY:   History of iron deficiency anemia,  "takes daily iron supplementation.    ENDO: No history of diabetes or thyroid dysfunction  NEURO:   No history of headaches, syncope, paralysis, seizures or tremors.  All other reviewed and negative other than HPI.    OBJECTIVE:    BP (!) 157/76   Pulse 61   Temp 97.6 °F (36.4 °C)   Resp 18   Ht 5' 5" (1.651 m)   Wt 70.8 kg (156 lb 1.4 oz)   SpO2 100%   BMI 25.97 kg/m²     PHYSICAL EXAMINATION:    GENERAL: Well appearing, in no acute distress, alert and oriented x3.  PSYCH:  Mood and affect appropriate.  SKIN: Tight skin associated with scleroderma.   HEAD/FACE:  Normocephalic, atraumatic. Cranial nerves grossly intact.  CV: RRR with palpation of the radial artery.  PULM: No evidence of respiratory difficulty, symmetric chest rise.  BACK:  There is no pain with palpation over the facet joints of the lumbar spine bilaterally. Limited ROM with mild pain on extension. Positive facet loading bilaterally.    EXTREMITIES: Contractures over on bilateral hands with ulcerations to knuckles, right greater than left. Boots noted to bilateral feet.   MUSCULOSKELETAL:  There is no pain to palpation over the greater trochanteric bursa bilaterally.  FABERs test is positive bilaterally.  FADIRs test is negative.   Bilateral lower extremity strength testing limited secondary to pain in the feet.    NEURO: Bilateral lower extremity coordination and muscle stretch reflexes are physiologic and symmetric. Decreased sensation to BLE. Plantar response are downgoing. No clonus.  No loss of sensation is noted.  GAIT: Antalgic, ambulates without assistance         Lab Results   Component Value Date    WBC 3.80 (L) 11/15/2019    HGB 13.1 11/15/2019    HCT 41.3 11/15/2019    MCV 95 11/15/2019     11/15/2019     BMP  Lab Results   Component Value Date     11/15/2019    K 4.1 11/15/2019     11/15/2019    CO2 24 11/15/2019    BUN 18 11/15/2019    CREATININE 0.7 11/15/2019    CALCIUM 9.2 11/15/2019    ANIONGAP 8 11/15/2019 "    ESTGFRAFRICA >60.0 11/15/2019    EGFRNONAA >60.0 11/15/2019         ASSESSMENT: 69 y.o. year old female with pain, consistent with     Encounter Diagnoses   Name Primary?    Chronic pain disorder Yes    Cutaneous scleroderma     Idiopathic neuropathy        PLAN:     - Previous imaging was reviewed and discussed with the patient today.    - Decrease Lyrica to 100 mg BID.     - Continue to follow up with wound care.     - Continues home exercise routine.     - RTC in 1 month.     - Dr. King was consulted on the patient and agrees with this plan.    The above plan and management options were discussed at length with patient. Patient is in agreement with the above and verbalized understanding.     Viktoriya Camara NP  12/17/2019

## 2019-12-18 ENCOUNTER — PATIENT MESSAGE (OUTPATIENT)
Dept: GASTROENTEROLOGY | Facility: CLINIC | Age: 69
End: 2019-12-18

## 2019-12-18 RX ORDER — PREGABALIN 100 MG/1
100 CAPSULE ORAL 2 TIMES DAILY
Qty: 60 CAPSULE | Refills: 2 | Status: SHIPPED | OUTPATIENT
Start: 2019-12-18 | End: 2020-01-17

## 2019-12-30 ENCOUNTER — TELEPHONE (OUTPATIENT)
Dept: PULMONOLOGY | Facility: CLINIC | Age: 69
End: 2019-12-30

## 2019-12-30 DIAGNOSIS — M34.9 SCLERODERMA WITH PULMONARY INVOLVEMENT: Primary | ICD-10-CM

## 2020-01-02 ENCOUNTER — TELEPHONE (OUTPATIENT)
Dept: GASTROENTEROLOGY | Facility: CLINIC | Age: 70
End: 2020-01-02

## 2020-01-02 DIAGNOSIS — E78.5 HYPERLIPIDEMIA: ICD-10-CM

## 2020-01-02 DIAGNOSIS — M34.9 SCLERODERMA: ICD-10-CM

## 2020-01-02 NOTE — TELEPHONE ENCOUNTER
Yes, the schedule for 24 hr pH test are booked up currently through the end of February, she will be contacted soon to schedule procedure for sometime in March.

## 2020-01-03 ENCOUNTER — TELEPHONE (OUTPATIENT)
Dept: ENDOSCOPY | Facility: HOSPITAL | Age: 70
End: 2020-01-03

## 2020-01-03 NOTE — TELEPHONE ENCOUNTER
Patient is scheduled for 24 hr pH impedance test and Esophageal Manometry on 3/4/20.    Thanks,  Rae

## 2020-01-06 DIAGNOSIS — E78.5 HYPERLIPIDEMIA: ICD-10-CM

## 2020-01-06 DIAGNOSIS — M34.9 SCLERODERMA: ICD-10-CM

## 2020-01-06 RX ORDER — PRAVASTATIN SODIUM 20 MG/1
TABLET ORAL
Qty: 90 TABLET | Refills: 0 | Status: SHIPPED | OUTPATIENT
Start: 2020-01-06 | End: 2021-02-05 | Stop reason: SDUPTHER

## 2020-01-06 RX ORDER — PRAVASTATIN SODIUM 20 MG/1
20 TABLET ORAL NIGHTLY
Qty: 90 TABLET | Refills: 3 | Status: SHIPPED | OUTPATIENT
Start: 2020-01-06 | End: 2020-01-17 | Stop reason: SDUPTHER

## 2020-01-10 ENCOUNTER — HOSPITAL ENCOUNTER (OUTPATIENT)
Dept: PULMONOLOGY | Facility: CLINIC | Age: 70
Discharge: HOME | End: 2020-01-10
Payer: MEDICARE

## 2020-01-10 ENCOUNTER — TELEPHONE (OUTPATIENT)
Dept: RHEUMATOLOGY | Facility: CLINIC | Age: 70
End: 2020-01-10

## 2020-01-10 ENCOUNTER — OFFICE VISIT (OUTPATIENT)
Dept: PULMONOLOGY | Facility: CLINIC | Age: 70
End: 2020-01-10
Payer: MEDICARE

## 2020-01-10 VITALS
HEART RATE: 69 BPM | OXYGEN SATURATION: 99 % | HEIGHT: 65 IN | BODY MASS INDEX: 25.49 KG/M2 | DIASTOLIC BLOOD PRESSURE: 60 MMHG | WEIGHT: 153 LBS | SYSTOLIC BLOOD PRESSURE: 128 MMHG

## 2020-01-10 DIAGNOSIS — M34.9 SCLERODERMA WITH PULMONARY INVOLVEMENT: ICD-10-CM

## 2020-01-10 DIAGNOSIS — K21.9 GASTROESOPHAGEAL REFLUX DISEASE, ESOPHAGITIS PRESENCE NOT SPECIFIED: ICD-10-CM

## 2020-01-10 DIAGNOSIS — J84.9 ILD (INTERSTITIAL LUNG DISEASE): ICD-10-CM

## 2020-01-10 DIAGNOSIS — D69.6 THROMBOCYTOPENIA: Primary | ICD-10-CM

## 2020-01-10 DIAGNOSIS — I27.29 PULMONARY HYPERTENSION ASSOCIATED WITH SYSTEMIC DISORDER: Chronic | ICD-10-CM

## 2020-01-10 LAB
DLCO ADJ PRE: 16.03 ML/(MIN*MMHG) (ref 16.44–27.9)
DLCO SINGLE BREATH LLN: 16.44
DLCO SINGLE BREATH PRE REF: 71.6 %
DLCO SINGLE BREATH REF: 22.17
DLCOC SBVA LLN: 2.94
DLCOC SBVA PRE REF: 103.8 %
DLCOC SBVA REF: 4.35
DLCOC SINGLE BREATH LLN: 16.44
DLCOC SINGLE BREATH PRE REF: 72.3 %
DLCOC SINGLE BREATH REF: 22.17
DLCOCSBVAULN: 5.75
DLCOCSINGLEBREATHULN: 27.9
DLCOSINGLEBREATHULN: 27.9
DLCOVA LLN: 2.94
DLCOVA PRE REF: 102.8 %
DLCOVA PRE: 4.47 ML/(MIN*MMHG*L) (ref 2.94–5.75)
DLCOVA REF: 4.35
DLCOVAULN: 5.75
DLVAADJ PRE: 4.51 ML/(MIN*MMHG*L) (ref 2.94–5.75)
ERVN2 LLN: 0.69
ERVN2 PRE REF: 52.2 %
ERVN2 PRE: 0.36 L (ref 0.69–0.69)
ERVN2 REF: 0.69
ERVN2ULN: 0.69
FEF 25 75 LLN: 0.68
FEF 25 75 PRE REF: 102.9 %
FEF 25 75 REF: 1.75
FEV05 LLN: 0.92
FEV05 REF: 1.78
FEV1 FVC LLN: 66
FEV1 FVC PRE REF: 104.5 %
FEV1 FVC REF: 79
FEV1 LLN: 1.42
FEV1 PRE REF: 78.2 %
FEV1 REF: 2.01
FRCN2 LLN: 1.94
FRCN2 PRE REF: 54.3 %
FRCN2 REF: 2.76
FRCN2ULN: 3.59
FVC LLN: 1.84
FVC PRE REF: 74.3 %
FVC REF: 2.58
IVC PRE: 2.12 L (ref 1.84–3.31)
IVC SINGLE BREATH LLN: 1.84
IVC SINGLE BREATH PRE REF: 82.4 %
IVC SINGLE BREATH REF: 2.58
IVCSINGLEBREATHULN: 3.31
PEF LLN: 3.11
PEF PRE REF: 118.3 %
PEF REF: 5.17
PHYSICIAN COMMENT: ABNORMAL
PRE DLCO: 15.88 ML/(MIN*MMHG) (ref 16.44–27.9)
PRE FEF 25 75: 1.8 L/S (ref 0.68–2.82)
PRE FET 100: 5.14 SEC
PRE FEV05 REF: 75.3 %
PRE FEV1 FVC: 82.19 % (ref 66.03–91.3)
PRE FEV1: 1.57 L (ref 1.42–2.61)
PRE FEV5: 1.34 L (ref 0.92–2.63)
PRE FRC N2: 1.5 L
PRE FVC: 1.92 L (ref 1.84–3.31)
PRE PEF: 6.12 L/S (ref 3.11–7.24)
RVN2 LLN: 1.5
RVN2 PRE REF: 55 %
RVN2 PRE: 1.14 L (ref 1.5–2.65)
RVN2 REF: 2.07
RVN2TLCN2 LLN: 32.49
RVN2TLCN2 PRE REF: 85.2 %
RVN2TLCN2 PRE: 35.85 % (ref 32.49–51.67)
RVN2TLCN2 REF: 42.08
RVN2TLCN2ULN: 51.67
RVN2ULN: 2.65
TLCN2 LLN: 4.11
TLCN2 PRE REF: 62.4 %
TLCN2 PRE: 3.18 L (ref 4.11–6.09)
TLCN2 REF: 5.1
TLCN2ULN: 6.09
VA PRE: 3.55 L (ref 4.95–4.95)
VA SINGLE BREATH LLN: 4.95
VA SINGLE BREATH PRE REF: 71.8 %
VA SINGLE BREATH REF: 4.95
VASINGLEBREATHULN: 4.95
VCMAXN2 LLN: 1.84
VCMAXN2 PRE REF: 79.2 %
VCMAXN2 PRE: 2.04 L (ref 1.84–3.31)
VCMAXN2 REF: 2.58
VCMAXN2ULN: 3.31

## 2020-01-10 PROCEDURE — 99214 OFFICE O/P EST MOD 30 MIN: CPT | Mod: 25,S$PBB,, | Performed by: INTERNAL MEDICINE

## 2020-01-10 PROCEDURE — 94010 BREATHING CAPACITY TEST: CPT | Mod: PBBFAC | Performed by: INTERNAL MEDICINE

## 2020-01-10 PROCEDURE — 99999 PR PBB SHADOW E&M-EST. PATIENT-LVL III: ICD-10-PCS | Mod: PBBFAC,,, | Performed by: INTERNAL MEDICINE

## 2020-01-10 PROCEDURE — 1126F PR PAIN SEVERITY QUANTIFIED, NO PAIN PRESENT: ICD-10-PCS | Mod: ,,, | Performed by: INTERNAL MEDICINE

## 2020-01-10 PROCEDURE — 94727 GAS DIL/WSHOT DETER LNG VOL: CPT | Mod: 26,S$PBB,, | Performed by: INTERNAL MEDICINE

## 2020-01-10 PROCEDURE — 1159F MED LIST DOCD IN RCRD: CPT | Mod: ,,, | Performed by: INTERNAL MEDICINE

## 2020-01-10 PROCEDURE — 1126F AMNT PAIN NOTED NONE PRSNT: CPT | Mod: ,,, | Performed by: INTERNAL MEDICINE

## 2020-01-10 PROCEDURE — 94729 DIFFUSING CAPACITY: CPT | Mod: 26,S$PBB,, | Performed by: INTERNAL MEDICINE

## 2020-01-10 PROCEDURE — 99213 OFFICE O/P EST LOW 20 MIN: CPT | Mod: PBBFAC,25 | Performed by: INTERNAL MEDICINE

## 2020-01-10 PROCEDURE — 94727 PR PULM FUNCTION TEST BY GAS: ICD-10-PCS | Mod: 26,S$PBB,, | Performed by: INTERNAL MEDICINE

## 2020-01-10 PROCEDURE — 94729 DIFFUSING CAPACITY: CPT | Mod: PBBFAC | Performed by: INTERNAL MEDICINE

## 2020-01-10 PROCEDURE — 99214 PR OFFICE/OUTPT VISIT, EST, LEVL IV, 30-39 MIN: ICD-10-PCS | Mod: 25,S$PBB,, | Performed by: INTERNAL MEDICINE

## 2020-01-10 PROCEDURE — 94727 GAS DIL/WSHOT DETER LNG VOL: CPT | Mod: PBBFAC | Performed by: INTERNAL MEDICINE

## 2020-01-10 PROCEDURE — 99999 PR PBB SHADOW E&M-EST. PATIENT-LVL III: CPT | Mod: PBBFAC,,, | Performed by: INTERNAL MEDICINE

## 2020-01-10 PROCEDURE — 94010 BREATHING CAPACITY TEST: ICD-10-PCS | Mod: 26,S$PBB,, | Performed by: INTERNAL MEDICINE

## 2020-01-10 PROCEDURE — 1159F PR MEDICATION LIST DOCUMENTED IN MEDICAL RECORD: ICD-10-PCS | Mod: ,,, | Performed by: INTERNAL MEDICINE

## 2020-01-10 PROCEDURE — 94729 PR C02/MEMBANE DIFFUSE CAPACITY: ICD-10-PCS | Mod: 26,S$PBB,, | Performed by: INTERNAL MEDICINE

## 2020-01-10 PROCEDURE — 94010 BREATHING CAPACITY TEST: CPT | Mod: 26,S$PBB,, | Performed by: INTERNAL MEDICINE

## 2020-01-11 RX ORDER — NIFEDIPINE 90 MG/1
TABLET, FILM COATED, EXTENDED RELEASE ORAL
Qty: 90 TABLET | Refills: 3 | Status: ON HOLD | OUTPATIENT
Start: 2020-01-11 | End: 2021-02-01 | Stop reason: SDUPTHER

## 2020-01-13 NOTE — PROGRESS NOTES
ADVANCED LUNG DISEASE FOLLOW-UP    Subjective:       Patient ID: Yamel Shah is a 69 y.o. female.    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  Since her last visit, she is currently doing well.  States that her dyspnea is at her baseline and only present with heavy exertion.  Her skin and joint findings remains stable with her current therapy.  She has been able to be more active as the wound on her foot continues to heal.  She did have a few bouts of pneumonia a couple months ago.  She states that she developed a productive cough and was given antibiotics which eventually improved her symptoms.  She does endorse significant GERD and is scheduled to see Dr. Mendoza.  She believes that a large portion of her pulmonary symptoms are related to aspiration.      Review of Systems   Constitutional: Negative for fever, chills, weight loss, weight gain, activity change, appetite change, fatigue, night sweats and weakness.   HENT: Negative for nosebleeds, postnasal drip, rhinorrhea, sinus pressure, sore throat, trouble swallowing, voice change, congestion, ear pain and hearing loss.    Eyes: Negative for redness.   Respiratory: Negative for snoring, cough, sputum production, choking, chest tightness, shortness of breath, wheezing, orthopnea, dyspnea on extertion and Paroxysmal Nocturnal Dyspnea.    Cardiovascular: Negative for chest pain, palpitations and leg swelling.   Genitourinary: Negative for difficulty urinating.   Endocrine: Negative for polydipsia, cold intolerance, heat intolerance and polyuria.    Musculoskeletal: Positive for gait problem. Negative for arthralgias, back pain, joint swelling and myalgias.   Skin: Negative for rash.   Gastrointestinal: Positive for acid reflux. Negative for nausea, vomiting, abdominal pain and abdominal distention.   Neurological: Negative for dizziness, syncope, weakness, light-headedness and headaches.    Hematological: Negative for adenopathy. Does not bruise/bleed easily.   Psychiatric/Behavioral: Negative for confusion. The patient is not nervous/anxious.        Objective:      Physical Exam   Constitutional: She is oriented to person, place, and time. She appears well-developed and well-nourished. No distress.   HENT:   Head: Normocephalic.   Nose: Nose normal.   Mouth/Throat: Oropharynx is clear and moist. Normal dentition. No oropharyngeal exudate.   Neck: Normal range of motion. Neck supple. No JVD present. No tracheal deviation present. No thyromegaly present.   Cardiovascular: Normal rate, regular rhythm, normal heart sounds and intact distal pulses. Exam reveals no gallop and no friction rub.   No murmur heard.  Pulmonary/Chest: Normal expansion, symmetric chest wall expansion and effort normal. No respiratory distress. She has no decreased breath sounds. She has no wheezes. She has no rhonchi. She has rales in the right lower field and the left lower field. She exhibits no tenderness.   Abdominal: Soft. Bowel sounds are normal. She exhibits no distension. There is no tenderness.   Musculoskeletal: Normal range of motion. She exhibits no edema, tenderness or deformity.   Lymphadenopathy:     She has no cervical adenopathy.   Neurological: She is alert and oriented to person, place, and time. Gait normal.   Skin: Skin is warm and dry. She is not diaphoretic.   Psychiatric: She has a normal mood and affect. Judgment normal.   Nursing note and vitals reviewed.    Personal Diagnostic Review  PFTs today reviewed.  FVC and DLCO stable from previous.      6MW 10/23/2019 9/9/2019 9/4/2015 1/19/2015 2/14/2014 6/18/2013 1/8/2013   6MWT Status completed without stopping completed without stopping not completed completed without stopping completed without stopping - completed without stopping   Patient Reported Dyspnea;Leg pain Dyspnea - No complaints No complaints No complaints No complaints   Was O2 used? No No No  No No No No   6MW Distance walked (feet) 1362 1100 - 1700 1500 1800 1740   Distance walked (meters) 415.14 335.28 - 518.16 457.2 548.64 530.35   Did patient stop? No No - No No No No   Oxygen Saturation 99 98 99 98 100 96 98   Supplemental Oxygen Room Air Room Air Room Air Room Air Room Air Room Air Room Air   Heart Rate 76 63 67 65 65 89 71   Blood Pressure 133/80 146/67 124/65 135/62 112/55 128/60 133/77   Ju Dyspnea Rating  light light moderate very, very light (just noticeable) moderate light moderate   Oxygen Saturation 99 98 - 100 100 96 96   Supplemental Oxygen Room Air Room Air - Room Air Room Air Room Air Room Air   Heart Rate 92 80 - 49 93 120 100   Blood Pressure 136/60 129/58 - 143/60 147/66 143/63 165/71   Ju Dyspnea Rating  moderate moderate - light moderate moderate very light   Recovery Time (seconds) 78 180 - 72 61 70 140   Oxygen Saturation 99 99 - 100 100 98 98   Supplemental Oxygen Room Air Room Air - Room Air Room Air Room Air Room Air   Heart Rate 71 67 - 71 88 99 78        Assessment:       No diagnosis found.    Outpatient Encounter Medications as of 1/10/2020   Medication Sig Dispense Refill    albuterol (VENTOLIN HFA) 90 mcg/actuation inhaler Inhale 2 puffs into the lungs every 4 (four) hours as needed for Wheezing. Rescue 18 g 1    carboxymethylcellulose sodium (REFRESH TEARS) 0.5 % Drop Apply 1-2 drops to every as needed      conjugated estrogens (PREMARIN) vaginal cream Place 1 g vaginally twice a week. Place pea sized amount to vagina twice per day for two weeks then twice per week therafter 30 g 12    ergocalciferol (ERGOCALCIFEROL) 50,000 unit Cap Take 1 capsule (50,000 Units total) by mouth every 7 days. 12 capsule 1    multivitamin capsule Take 1 capsule by mouth once daily.       mycophenolate mofetil (CELLCEPT) 200 mg/mL SusR Take 5 mLs (1,000 mg total) by mouth 2 (two) times daily. 480 mL 1    omeprazole (PRILOSEC) 40 MG capsule Take 1 capsule (40 mg total) by mouth 2  (two) times daily before meals. 180 capsule 3    pravastatin (PRAVACHOL) 20 MG tablet TAKE 1 TABLET BY MOUTH EVERY EVENING 90 tablet 0    pravastatin (PRAVACHOL) 20 MG tablet Take 1 tablet (20 mg total) by mouth every evening. 90 tablet 3    pregabalin (LYRICA) 100 MG capsule Take 1 capsule (100 mg total) by mouth 2 (two) times daily. 60 capsule 2    spironolactone (ALDACTONE) 25 MG tablet Take 1 tablet (25 mg total) by mouth once daily. 30 tablet 11    tadalafil (ADCIRCA) 20 mg Tab Take 2 tablets (40 mg total) by mouth once daily. 180 tablet 3    [DISCONTINUED] NIFEdipine (ADALAT CC) 90 MG TbSR TAKE 1 TABLET(90 MG) BY MOUTH EVERY DAY 90 tablet 3    ALPRAZolam (XANAX) 0.25 MG tablet Take 1 tablet (0.25 mg total) by mouth 3 (three) times daily as needed for Anxiety. Do not take regularly (Patient not taking: Reported on 1/10/2020) 15 tablet 0    b complex vitamins tablet Take 1 tablet by mouth once daily.      ferrous sulfate 325 (65 FE) MG EC tablet Take 1 tablet (325 mg total) by mouth 3 (three) times daily. (Patient not taking: Reported on 1/10/2020)  0    fluoride, sodium, (PREVIDENT 5000) 1.1 % Pste       levoFLOXacin (LEVAQUIN) 500 MG tablet Take 1 tablet (500 mg total) by mouth once daily. (Patient not taking: Reported on 1/10/2020) 10 tablet 0    methylPREDNISolone (MEDROL DOSEPACK) 4 mg tablet use as directed (Patient not taking: Reported on 1/10/2020) 1 Package 0     No facility-administered encounter medications on file as of 1/10/2020.      No orders of the defined types were placed in this encounter.      Plan:          Problem List Items Addressed This Visit        Pulmonary    Pulmonary hypertension associated with systemic disorder (Chronic)    Overview     6/2015 [facility] - no resting or exercise induced pulmonary hypertension. Not a bosentan candidate.          Current Assessment & Plan     Continue with Adcirca.         ILD (interstitial lung disease)    Overview     PFTs       FVC     SVC       RV     DLco    1/10/20    74       72  3/12/19    60         64         61      82  3/6/18      64         70         64      112  4/8/16      70         70         89       84  9/4/15      66         62         72       71  1/19/15    66           2/14/14    64                                 87           Current Assessment & Plan     2/2 SSc-ILD.  FVC and DLCO are stable therefore I do not suspect any progression of her ILD.  Continue with MMF 1000mg BID per rheumatology.  I suspect that her occasional acute respiratory symptoms are related to her GERD and encouraged GI follow-up with Dr. Mendoza.  Will continue to monitor PFTs ever 6 months.  Given her GI issues and stability in her PFTs, I do not think she would benefit from OFEV at this time.              GI    GERD (gastroesophageal reflux disease)    Current Assessment & Plan     Scheduled to follow with Dr. Mendoza.  GERD symptoms stable.  Likely has esophageal dysmotility and microaspiration.             Follow-up in 6 months with repeat PFTs.      Roberta Leigh D.O.  Pulmonary/Critical Care Medicine

## 2020-01-13 NOTE — ASSESSMENT & PLAN NOTE
Scheduled to follow with Dr. Mendoza.  GERD symptoms stable.  Likely has esophageal dysmotility and microaspiration.

## 2020-01-13 NOTE — ASSESSMENT & PLAN NOTE
2/2 SSc-ILD.  FVC and DLCO are stable therefore I do not suspect any progression of her ILD.  Continue with MMF 1000mg BID per rheumatology.  I suspect that her occasional acute respiratory symptoms are related to her GERD and encouraged GI follow-up with Dr. Mendoza.  Will continue to monitor PFTs ever 6 months.  Given her GI issues and stability in her PFTs, I do not think she would benefit from OFEV at this time.

## 2020-01-14 ENCOUNTER — TELEPHONE (OUTPATIENT)
Dept: PHARMACY | Facility: CLINIC | Age: 70
End: 2020-01-14

## 2020-01-14 ENCOUNTER — OFFICE VISIT (OUTPATIENT)
Dept: RHEUMATOLOGY | Facility: CLINIC | Age: 70
End: 2020-01-14
Payer: MEDICARE

## 2020-01-14 VITALS
SYSTOLIC BLOOD PRESSURE: 114 MMHG | HEIGHT: 66 IN | BODY MASS INDEX: 25.15 KG/M2 | HEART RATE: 87 BPM | WEIGHT: 156.5 LBS | DIASTOLIC BLOOD PRESSURE: 63 MMHG

## 2020-01-14 DIAGNOSIS — D69.6 THROMBOCYTOPENIA: ICD-10-CM

## 2020-01-14 DIAGNOSIS — L97.519 SKIN ULCERS OF BOTH FEET: ICD-10-CM

## 2020-01-14 DIAGNOSIS — M34.9 SCLERODERMA: Primary | ICD-10-CM

## 2020-01-14 DIAGNOSIS — L97.529 SKIN ULCERS OF BOTH FEET: ICD-10-CM

## 2020-01-14 DIAGNOSIS — J84.9 ILD (INTERSTITIAL LUNG DISEASE): ICD-10-CM

## 2020-01-14 DIAGNOSIS — I27.29 PULMONARY HYPERTENSION ASSOCIATED WITH SYSTEMIC DISORDER: ICD-10-CM

## 2020-01-14 DIAGNOSIS — K21.9 GASTROESOPHAGEAL REFLUX DISEASE, ESOPHAGITIS PRESENCE NOT SPECIFIED: ICD-10-CM

## 2020-01-14 DIAGNOSIS — I87.2 VENOUS INSUFFICIENCY OF BOTH LOWER EXTREMITIES: ICD-10-CM

## 2020-01-14 PROCEDURE — 1159F MED LIST DOCD IN RCRD: CPT | Mod: GC,,, | Performed by: STUDENT IN AN ORGANIZED HEALTH CARE EDUCATION/TRAINING PROGRAM

## 2020-01-14 PROCEDURE — 1125F AMNT PAIN NOTED PAIN PRSNT: CPT | Mod: GC,,, | Performed by: STUDENT IN AN ORGANIZED HEALTH CARE EDUCATION/TRAINING PROGRAM

## 2020-01-14 PROCEDURE — 99214 OFFICE O/P EST MOD 30 MIN: CPT | Mod: S$PBB,GC,, | Performed by: STUDENT IN AN ORGANIZED HEALTH CARE EDUCATION/TRAINING PROGRAM

## 2020-01-14 PROCEDURE — 99214 PR OFFICE/OUTPT VISIT, EST, LEVL IV, 30-39 MIN: ICD-10-PCS | Mod: S$PBB,GC,, | Performed by: STUDENT IN AN ORGANIZED HEALTH CARE EDUCATION/TRAINING PROGRAM

## 2020-01-14 PROCEDURE — 99999 PR PBB SHADOW E&M-EST. PATIENT-LVL IV: ICD-10-PCS | Mod: PBBFAC,GC,, | Performed by: STUDENT IN AN ORGANIZED HEALTH CARE EDUCATION/TRAINING PROGRAM

## 2020-01-14 PROCEDURE — 1159F PR MEDICATION LIST DOCUMENTED IN MEDICAL RECORD: ICD-10-PCS | Mod: GC,,, | Performed by: STUDENT IN AN ORGANIZED HEALTH CARE EDUCATION/TRAINING PROGRAM

## 2020-01-14 PROCEDURE — 99999 PR PBB SHADOW E&M-EST. PATIENT-LVL IV: CPT | Mod: PBBFAC,GC,, | Performed by: STUDENT IN AN ORGANIZED HEALTH CARE EDUCATION/TRAINING PROGRAM

## 2020-01-14 PROCEDURE — 99214 OFFICE O/P EST MOD 30 MIN: CPT | Mod: PBBFAC | Performed by: STUDENT IN AN ORGANIZED HEALTH CARE EDUCATION/TRAINING PROGRAM

## 2020-01-14 PROCEDURE — 1125F PR PAIN SEVERITY QUANTIFIED, PAIN PRESENT: ICD-10-PCS | Mod: GC,,, | Performed by: STUDENT IN AN ORGANIZED HEALTH CARE EDUCATION/TRAINING PROGRAM

## 2020-01-14 RX ORDER — MYCOPHENOLATE MOFETIL 200 MG/ML
500 POWDER, FOR SUSPENSION ORAL 2 TIMES DAILY
Qty: 480 ML | Refills: 1 | Status: SHIPPED | OUTPATIENT
Start: 2020-01-14 | End: 2020-07-30 | Stop reason: SDUPTHER

## 2020-01-14 RX ORDER — TADALAFIL 20 MG/1
40 TABLET ORAL DAILY
Qty: 180 TABLET | Refills: 3 | Status: SHIPPED | OUTPATIENT
Start: 2020-01-14 | End: 2020-01-23

## 2020-01-14 ASSESSMENT — ROUTINE ASSESSMENT OF PATIENT INDEX DATA (RAPID3)
FATIGUE SCORE: 3
MDHAQ FUNCTION SCORE: 1.9
AM STIFFNESS SCORE: 0, NO
TOTAL RAPID3 SCORE: 4.78
PSYCHOLOGICAL DISTRESS SCORE: 0
PAIN SCORE: 5
PATIENT GLOBAL ASSESSMENT SCORE: 3

## 2020-01-14 NOTE — PROGRESS NOTES
Subjective:       Patient ID: Yamel Shah is a 69 y.o. female.    Chief Complaint: F/u for systemic scleroderma disease    Follow up for Systemic scleroderma. Last seen by Dr. Ahuja and myself on 10/15/2019.     Interval Hx: Since last visit, pt has had 2 infections and is now off of mycophenolate. Nov 12 went to urgent care and diganosed with PNA and given abx x 1 week. Mycophenolate stopped on Nov 12 and restarted on Nov 26. On Dec 7, she had cough/sore throat/mucous and went to urgent care, dx with Bronchitis, given abx and steroid shot. She stopped Mycophenolate on Dec 8 and has remained off up to this point. Pt was supposed to be on mycophenolate to 1000 BID, (liquid formulation= 5ml BID).  She is feeling back to baseline now.     Seen by pulmonary Dr. Barry 1/13/2020 for follow-up of SSc-ILD.  Dyspnea is at her baseline and only present with heavy exertion.  Her skin and joint findings remains stable with her current therapy.  She has been able to be more active as the wound on her foot continues to heal.  She did have a few bouts of pneumonia a couple months ago.  She states that she developed a productive cough and was given antibiotics which eventually improved her symptoms.  She does endorse significant GERD and is scheduled to see Dr. Mendoza.  She believes that a large portion of her pulmonary symptoms are related to aspiration. PFTs reviewed. FVC and DLCO are stable therefore Dr. Leigh does not suspect any progression of her ILD.  Continue with MMF 1000mg BID.  She suspects that her occasional acute respiratory symptoms are related to her GERD and encouraged GI follow-up with Dr. Mendoza. Monitor PFTs ever 6 months.  Given her GI issues and stability in her PFTs, do not think she would benefit from OFEV at this time. Continue with Adcirca.    10/28 6 MWT : Six minute walk distance is 415.14 meters (1362 feet) with moderate dyspnea. During exercise, there was no desaturation while breathing  "room air. Both blood pressure and heart rate remained stable with walking. The patient reported non-pulmonary symptoms during exercise. Since the previous study in September 2019, exercise capacity is significantly improved. Based upon age and body mass index, exercise capacity is normal.    Appt with GI Dr. Mendoza scheduled, Patient is scheduled for 24 hr pH impedance test and Esophageal Manometry on 3/4/20.    Follows with pain management, Dr. Celis and NP Viktoriya Camara. Seen on 12/18/19, decreased Lyrica to 100 mg BID.    Pt states she has been walking a lot per Dr. Claudio's ([facility] surgery) request. Will f/u in the future but for now LE ulcers improving and swelling decreased. Saw Dr. Lim for Pulm HTN on 9/9, continues on Adcirca (tadalifil). UTD immunizations.         Initial Hx:   67YF with Systemic scleroderma, diagnosed 1983- She has been seeing Dr. Ahuja for over 10 years.( +SSA, +SCL-70, -RNAP3, ILD). She has stable ILD on imaging 9/2015 and had mild exercise induced pulmonary hypertension in 2013 that has resolved on 6/2015 RHC and most recent ECHO done 3/2019 shows normal EF with grade 2 diastolic dysfunction, increased sPAP 41 compared to 28mmHg in 06/2018. PFTs 4/2016 showed normal DLCO, RV, FEV1 with mild restriction. Repeat PFTs 03/2018 show normal DLco, decreased RV (89-->64), SVC remains the same at 70 and FVC 70. Repeat PFTs 03/2019 showed FVC 60, SVC 64, RV 61, DLco 82.      03/2019 CT chest/abd was done which showed stable appearing chronic interstitial lung disease consistent with patient's known history of scleroderma; there is new tree-in-bud opacity noted in the right upper lobe which while nonspecific can be seen in the setting of inflammatory infectious processes such as aspiration. Pt was referred to Dr Barry for evaluation of ILD.   As per Dr Barry note " Patient with significant scleroderma multiorgan involvement, Her PFTs reveal trend towards decrease in FVC,A 6MWT is not " "available at this time,Patient does have poor exercise function because of pain in the feet when she ambulates.  - She may benefit from starting on Immunosuppression with mycophenolate, although risks and benefit need to be reviewed further since she has had scleroderma since 1983. We initiated and provided the patient with basic information on the medication. She is going to review with Dr. Ahuja as well.   - Recommend to repeat PFTs w/DLCO and 6MWT (If patient can tolerate) every 6 months. May enroll in Pulmonary rehab once leg ulcers improve.     Seen by Dr Lim 03/2019 as per note "Given appearance of diastolic dysfxn on echo today and uncontrolled HTN/swelling, will go ahead and add aldactone 25mg daily today- needs BMP/BNP this am and again in 1 wk,no PH medicine changes today. From a PH standpoint, based on her echo and last RHC she appears to be doing very well- PFTs to be repeated per Dr Ahuja, chest imaging stable"     She had seen Vascular surgery in 03/2018 who will consider EVLT once ulcers have healed.  Pt is currently on nifedipine 90mg, pravastatin 20mg and adcirca 40mg     Recently retired in Dec 2018, worked as a .     Review of Systems   Constitutional: Negative for fatigue and fever.   HENT: Negative for congestion and rhinorrhea.    Eyes: Negative for pain and visual disturbance.   Respiratory: Negative for cough and shortness of breath.    Cardiovascular: Negative for chest pain and palpitations.   Gastrointestinal: Negative for abdominal pain and nausea.   Genitourinary: Negative for dysuria and hematuria.   Musculoskeletal: Negative for arthralgias and back pain.   Skin: Negative for color change and rash.   Neurological: Negative for dizziness and headaches.   Hematological: Negative for adenopathy. Does not bruise/bleed easily.   Psychiatric/Behavioral: Negative for agitation and confusion.         Objective:   /63   Pulse 87   Ht 5' 6" (1.676 m)   Wt 71 kg (156 lb 8 " oz)   BMI 25.26 kg/m²      Physical Exam   Vitals reviewed.  Constitutional: She is oriented to person, place, and time and well-developed, well-nourished, and in no distress. No distress.   HENT:   Head: Normocephalic and atraumatic.   Right Ear: External ear normal.   Left Ear: External ear normal.   Nose: Nose normal.   Mouth/Throat: Oropharynx is clear and moist. No oropharyngeal exudate.   Eyes: Conjunctivae and EOM are normal. Pupils are equal, round, and reactive to light. Right eye exhibits no discharge. Left eye exhibits no discharge. No scleral icterus.   Neck: Neck supple. No thyromegaly present.   Cardiovascular: Normal rate, regular rhythm, normal heart sounds and intact distal pulses.    No murmur heard.  Pulmonary/Chest: Effort normal. No respiratory distress. She has wheezes (mild). She has no rales. She exhibits no tenderness.   Abdominal: Soft. Bowel sounds are normal. She exhibits no distension. There is no tenderness. There is no guarding.   Neurological: She is alert and oriented to person, place, and time.   Skin: Skin is warm and dry. She is not diaphoretic.     Psychiatric: Mood and affect normal.   Musculoskeletal: Normal range of motion. She exhibits deformity. She exhibits no edema or tenderness.   Please see pics from prior visit.  Skin tightness  Claw hand deformity, flexion contractures b/l  LE bandaged and covered today, improving. See pics form prior  Skin score: 29  LE ulcers improving. Decreased swelling from prior        SPEP 1/10/2020  Increased total protein.   Increased gamma globulin, polyclonal.     JASON 1/10/2020  No monoclonal peaks identified.      US Venous 06/2019  Impression:   Right Leg:  Color flow evaluation of the lower extremity demonstrates fully compressible and patent veins; however, due to heavy pitting edema, one of the paired peroneal veins cannot be appreciated which could suggest chronic occlusive thrombus versus   poor imaging. There is no evidence of  venous thrombosis in the remaining deep or superficial veins.  Significant reflux is noted in the GSV including the saphenofemoral junction (SFJ) and the SSV including the saphenopopliteal junction (SPJ). There is no   evidence of reflux in the Accessory vein. Many branches are noted throughout the entire course of the GSV and SSV.  Incidental finding of significant reflux is noted in the FV and POP V.    Left Leg:  Color flow evaluation of the lower extremity demonstrates chronic occlusive thrombus in a small segment of the GSV BK. There is no evidence of venous thrombosis in the remaining deep or superficial veins.  Significant reflux is noted in the   GSV including the saphenofemoral junction (SFJ) and the SSV including the saphenopopliteal junction (SPJ). There is no evidence of reflux in the Accessory vein. Noted is a diameter decrease in the GSV from 4.8 mm to 2.6 mm at the level of the distal   thigh and 1.9 mm at the knee.  Multiple branches of the GSV are also noted throughout its entire course. Incidental finding of significant reflux is noted in the FV and POP V.     EGD 01/2017  Normal esophagus. Two biopsies were obtained in                         the upper third of the esophagus.                        - Z-line irregular. Biopsied.                        - Esophageal polyp(s) were found. Resected and                         retrieved.                        - Normal stomach.                        - Congested duodenal mucosa. Biopsied.  Recommendation:  - Await pathology results.                        - Discharge patient to home (ambulatory).                        - Resume previous diet.                        - Continue present medications.                        - Repeat the upper endoscopy in 6 months to assure                         complete removal of polyp at GE junction.        EGD 03/2019  Impression:    - Dilation in the entire esophagus.                        - Z-line irregular, 38 cm  from the incisors.                         Biopsied.                        - 3 cm hiatal hernia.                        - Benign-appearing esophageal stenosis.                        - Normal stomach.                        - A few gastric polyps. Biopsied.                        - Normal examined duodenum. Biopsied.  Recommendation:  - Await pathology results.                        - Discharge patient to home (with escort).                        - Resume previous diet.     C-scope 03/2019  Impression:     - Five 2 to 5 mm polyps in the transverse colon,                         removed with a jumbo cold forceps. Resected and                         retrieved.                        - The entire examined colon is normal.                        - The distal rectum and anal verge are normal on                         retroflexion view.                        - The examined portion of the ileum was normal.  Recommendation:    - Discharge patient to home (with escort).     CT chest/abd 03/2019                   Impression         Stable appearing chronic interstitial lung disease consistent with patient's known history of scleroderma; it is worth noting that there is new tree-in-bud opacity noted in the right upper lobe which while nonspecific can be seen in the setting of inflammatory infectious processes such as aspiration.    Persistently fluid-filled esophagus which can place the patient at risk for aspiration            DEXA 09/2018  Osteopenia. There is a 11.9% risk of a major osteoporotic fracture and a 2.6% risk of hip fracture in the next 10 years (FRAX).  Compared with previous DXA, BMD at the lumbar spine has remained stable, and the BMD at the total hip has decreased by -7.4%     2D ECHO 03/2019  · Normal left ventricular systolic function. The estimated ejection fraction is 60%  · Grade II (moderate) left ventricular diastolic dysfunction consistent with pseudonormalization.  · Elevated left atrial  pressure.  · No wall motion abnormalities.  · Normal right ventricular systolic function.  · Mild left atrial enlargement.  · Mild mitral regurgitation.  · The estimated PA systolic pressure is 41 mm Hg  · Normal central venous pressure (3 mm Hg).     CT chest 09/2015  Findings:  Examination of the vascular and soft tissue structures at the base of the neck is unremarkable.  The thoracic aorta maintains normal caliber, contour, and course without significant atherosclerotic calcification within its course.  The heart is not enlarged and there is no evidence of pericardial effusion. The esophagus is mildly dilated and   retained pneumatized secretions are identified within the dependent portion of the esophagus placing the patient at risk for aspiration and also suggestive esophageal dysmotility associated with scleroderma or reflux. There is no axillary, mediastinal,   or hilar lymphadenopathy.    A benign appearing but enlarged left axillary lymph node is identified, which appears stable to the prior examination dated 11/27/07.  Scattered normal sized and benign appearing mediastinal lymphadenopathy is also appreciated, also grossly similar to   the prior examination.     The lungs again are symmetrically expanded and demonstrate chronic generalized lung disease with small opacities and reticulations in a peripheral predominance.  This again is most notable in the lung bases were there is dilatation of small airways and faint groundglass opacity.  Findings appear grossly stable to the prior chest CT in 2007 and are consistent with interstitial lung disease as can be seen with scleroderma, pulmonary fibrosis or interstitial pneumonitis.  No focal mass lesion is identified.Limited views of the abdomen demonstrate nothing unusual on this noncontrast examination.  The osseous structures demonstrate mild degenerative changes and a stable appearing compression deformity of the T9 vertebral body.     PFTs       FVC      SVC      RV     DLco     1/10/20    74                                72  3/12/19    60         64         61      82  3/6/18      64         70         64      112  4/8/16      70         70         89       84  9/4/15      66         62         72       71  1/19/15    66           2/14/14    64                                 87        Assessment:       1. Scleroderma    2. ILD (interstitial lung disease)    3. Skin ulcers of both feet    4. Venous insufficiency of both lower extremities    5. Pulmonary hypertension associated with systemic disorder    6. Gastroesophageal reflux disease, esophagitis presence not specified    7. Thrombocytopenia          Plan:       Problem List Items Addressed This Visit        Derm    Skin ulcers of both feet       Pulmonary    Pulmonary hypertension associated with systemic disorder (Chronic)    ILD (interstitial lung disease)       Cardiac/Vascular    Venous insufficiency of both lower extremities       GI    GERD (gastroesophageal reflux disease)      Other Visit Diagnoses     Scleroderma    -  Primary    Thrombocytopenia             Follow up for Systemic scleroderma. Last seen by Dr. Ahuja and myself on 10/15/2019.     Interval Hx: Since last visit, pt has had 2 infections and is now off of mycophenolate. Nov 12 went to urgent care and diganosed with PNA and given abx x 1 week. Mycophenolate stopped on Nov 12 and restarted on Nov 26. On Dec 7, she had cough/sore throat/mucous and went to urgent care, dx with Bronchitis, given abx and steroid shot. She stopped Mycophenolate on Dec 8 and has remained off up to this point. Pt was supposed to be on mycophenolate to 1000 BID, (liquid formulation= 5ml BID).  She is feeling back to baseline now.     Seen by pulmonary Dr. Barry 1/13/2020 for follow-up of SSc-ILD.  Dyspnea is at her baseline and only present with heavy exertion.  Her skin and joint findings remains stable with her current therapy.  She has been able to be more  active as the wound on her foot continues to heal.  She did have a few bouts of pneumonia a couple months ago.  She states that she developed a productive cough and was given antibiotics which eventually improved her symptoms.  She does endorse significant GERD and is scheduled to see Dr. Mendoza.  She believes that a large portion of her pulmonary symptoms are related to aspiration. PFTs reviewed. FVC and DLCO are stable therefore Dr. Leigh does not suspect any progression of her ILD.  Continue with MMF 1000mg BID. Suspect that her occasional acute respiratory symptoms are related to her GERD and encouraged GI follow-up with Dr. Mendoza. Monitor PFTs ever 6 months.  Given her GI issues and stability in her PFTs, do not think she would benefit from OFEV at this time. Continue with Adcirca.    10/28 6 MWT : Six minute walk distance is 415.14 meters (1362 feet) with moderate dyspnea. During exercise, there was no desaturation while breathing room air. Both blood pressure and heart rate remained stable with walking. The patient reported non-pulmonary symptoms during exercise. Since the previous study in September 2019, exercise capacity is significantly improved. Based upon age and body mass index, exercise capacity is normal.    Appt with GI Dr. Mendoza, Patient is scheduled for 24 hr pH impedance test and Esophageal Manometry on 3/4 and f/u appt 3/23.    Follows with pain management, Dr. Celis and NP Viktoriya Camara. Seen on 12/18/19, decreased Lyrica to 100 mg BID.    Pt states she has been walking a lot per Dr. Claudio's ([facility] surgery) request. Will f/u in the future but for now LE ulcers improving and swelling decreased. Saw Dr. Lim for Pulm HTN on 9/9, continues on Adcirca (tadalifil). UTD immunizations.    Recent labs: WBC improved 4.6, hgb 13.2, plts decreased 207 > 113. Unremarkable CMP. wnl ESR 62 > 36 and CRP 9.7 > 6.6. 12/8/19 CXR: No interval detrimental change when compared to radiograph dated 11/12/2019.   Stable chronic findings.    Plan:  - Reviewed recent CBC, CMP, ESR, CRP. Inflammatory markers improved and wnl. CBC with thrombocytopenia 207 > 113. Repeat CBC in 2 wks. However thrombocytopenia was OFF of mycophenolate, so likely 2/2 abx/recent infections.  - Decrease mycophenolate to 500 mg BID (liquid formulation= 2.5 ml BID). Changed her prescription to reflect this and written in [facility] pt instructions.   - Discussed to hold mycophenolate for any infections or antibiotic use  - cont following with pulm Dr. Leigh with repeat PFTs in 6 months. F/u with GI Dr. Mendoza 3/4/2020  - UTD vaccines     Discussed with Dr. Ahuja. RTC 2-3 months. CBC in 2 wks, 4 labs (CBC, CMP, ESR, CRP) in 4 weeks.     Gretchen Madison DO  Rheumatology, PGY4

## 2020-01-14 NOTE — TELEPHONE ENCOUNTER
Patient picked-up Cellcept from [facility] on .$15 copay in 004. Name and  verified.  Reviewed that Cellcept dose changed to 2.5 ml po BID. Patient voiced understanding, and reported she plans to start dose change today. She reported 0 ml on hand and said it has been over a month since her last dose due to holding from being sick. She stated she is feeling well now, and MD cleared her to take Cellcept at appt today. Patient reported no new medications or dose changes. She stated she has not developed any new allergies or health conditions. Patient had no further questions or concerns.

## 2020-01-17 ENCOUNTER — OFFICE VISIT (OUTPATIENT)
Dept: PAIN MEDICINE | Facility: CLINIC | Age: 70
End: 2020-01-17
Payer: MEDICARE

## 2020-01-17 ENCOUNTER — TELEPHONE (OUTPATIENT)
Dept: PHARMACY | Facility: CLINIC | Age: 70
End: 2020-01-17

## 2020-01-17 VITALS
RESPIRATION RATE: 18 BRPM | HEART RATE: 76 BPM | HEIGHT: 66 IN | BODY MASS INDEX: 24.8 KG/M2 | SYSTOLIC BLOOD PRESSURE: 130 MMHG | WEIGHT: 154.31 LBS | TEMPERATURE: 97 F | DIASTOLIC BLOOD PRESSURE: 60 MMHG

## 2020-01-17 DIAGNOSIS — G60.9 IDIOPATHIC NEUROPATHY: ICD-10-CM

## 2020-01-17 DIAGNOSIS — M34.89 CUTANEOUS SCLERODERMA: ICD-10-CM

## 2020-01-17 DIAGNOSIS — G89.4 CHRONIC PAIN DISORDER: Primary | ICD-10-CM

## 2020-01-17 PROCEDURE — 1159F PR MEDICATION LIST DOCUMENTED IN MEDICAL RECORD: ICD-10-PCS | Mod: ,,, | Performed by: NURSE PRACTITIONER

## 2020-01-17 PROCEDURE — 99999 PR PBB SHADOW E&M-EST. PATIENT-LVL IV: ICD-10-PCS | Mod: PBBFAC,,, | Performed by: NURSE PRACTITIONER

## 2020-01-17 PROCEDURE — 1125F PR PAIN SEVERITY QUANTIFIED, PAIN PRESENT: ICD-10-PCS | Mod: ,,, | Performed by: NURSE PRACTITIONER

## 2020-01-17 PROCEDURE — 99213 OFFICE O/P EST LOW 20 MIN: CPT | Mod: S$PBB,,, | Performed by: NURSE PRACTITIONER

## 2020-01-17 PROCEDURE — 99999 PR PBB SHADOW E&M-EST. PATIENT-LVL IV: CPT | Mod: PBBFAC,,, | Performed by: NURSE PRACTITIONER

## 2020-01-17 PROCEDURE — 1125F AMNT PAIN NOTED PAIN PRSNT: CPT | Mod: ,,, | Performed by: NURSE PRACTITIONER

## 2020-01-17 PROCEDURE — 99214 OFFICE O/P EST MOD 30 MIN: CPT | Mod: PBBFAC | Performed by: NURSE PRACTITIONER

## 2020-01-17 PROCEDURE — 1159F MED LIST DOCD IN RCRD: CPT | Mod: ,,, | Performed by: NURSE PRACTITIONER

## 2020-01-17 PROCEDURE — 99213 PR OFFICE/OUTPT VISIT, EST, LEVL III, 20-29 MIN: ICD-10-PCS | Mod: S$PBB,,, | Performed by: NURSE PRACTITIONER

## 2020-01-17 RX ORDER — PREGABALIN 150 MG/1
150 CAPSULE ORAL DAILY
Qty: 30 CAPSULE | Refills: 2 | Status: SHIPPED | OUTPATIENT
Start: 2020-01-17 | End: 2020-05-05

## 2020-01-17 NOTE — TELEPHONE ENCOUNTER
LVM for callback to inform patient that [facility] received prescription for Adcirca and benefits investigation is required.  [facility] will be back in touch once insurance determination is received.

## 2020-01-17 NOTE — PROGRESS NOTES
Chronic Pain - Follow Up    Referring Physician: No ref. provider found    Chief Complaint:   Chief Complaint   Patient presents with    Foot Pain        SUBJECTIVE: Disclaimer: This note has been generated using voice-recognition software. There may be typographical errors that have been missed during proof-reading    Interval History 1/17/2020:  The patient returns to clinic today for follow up. She continues to report bilateral foot pain. She describes this pain as burning and tingling in nature. At last visit, we decreased her Lyrica dose to 100 mg at night. She reports increased pain since then. She would like to go to back to the 150 mg dose. She continues to have ulcers to her feet, left worse than right. She continues to participate in a home wound care program. She denies any other health changes. Her pain today is 6/10.    Interval History 12/17/2019:  The patient returns to clinic today for follow up. She reports improving foot pain. She reports improved healing ulcers to her left foot. She continues to report right foot pain that is burning and tingling in nature. She continues to participate in a home wound care routine. She continues to take Lyrica. She asks about decreasing this. She denies any other health changes. Her pain today is 5/10.    Interval History 9/17/2019:  The patient returns to clinic today for follow up. She continues to report bilateral foot pain that is burning and tingling in nature. Her pain is worse with sitting and at night. She continues to have slow healing ulcers to both feet. She continues to perform a home wound care program. She is no longer taking Gabapentin which was previously called in. She is now taking Pregabalin generic with benefit. She denies any other health changes. Her pain today is 4/10.    Interval History 6/13/2019:  The patient returns to clinic for follow up for bilateral foot pain. She continues to report bilateral foot pain that is burning in nature.  She continues to have slow healing wounds to both feet from ulcers. She continues to take Lyrica once daily but reports increased pain. She continues to take Vitamin B12. She denies any other health changes. Her pain today is 8/10.    Interval History 3/13/2019:  The patient returns to clinic today for follow up. She continues to report bilateral foot pain that is burning and tingling in nature. Her pain is worse with prolonged walking and standing. She continues to have bilateral foot wounds. She reports that these wounds have significantly improved since last visit. She is currently taking Vitamin B12 with benefit. She continues to report benefit with Lyrica. She is currently taking this once daily. She denies any other health changes. Her pain today is 5/10.    Interval History 2/6/2019:  The patient returns to clinic today for follow up. She reports that her bilateral foot wounds have significantly improved since last visit. She does report some significant improvement in her foot pain. She reports intermittent bilateral foot pain especially with prolonged walking. She describes this pain as heavy and tingling in nature. She is no longer taking Celebrex. She is currently taking Lyrica 150 mg BID with benefit. She does report that the Lyrica is expensive for her. She denies any other health changes. Her pain today is 5/10.    Interval History 12/5/18:  Patient returns to clinic today for follow up. She reports that since increasing her medications, she is having significant improvement in pain during the day time. She is currently taking Lyrica 75mg TID and Celebrex 200mg TID. However, she states that the burning  And tingling pain in both her feet increase in the evening around 6 or 7pm. She is unable to sleep during the nights due to the pain. She is still wearing her bilateral boots due to non healing ulcers from her scleroderma.     Interval History 8/30/2018:  The patient returns to clinic today for follow  up. She continues to report bilateral foot pain that is burning and tingling in nature. She reports that this pain is worse at night. She is currently taking Lyrica 75 mg BID with limited relief. She did not have any benefit with Mobic. She continues to take Aleve with some benefit. She continues to have nonhealing ulcers. She wears an ACE bandage to her right calf, as well as boots to her bilateral feet. She denies any other health changes. Her pain today is 7/10.     Interval History 7/11/2018:  Since previous encounter she has weaned from gabapentin to 600mg / day and did not notice significant worsening of pain, but was having somnelence from higher doses of the medication in the past.  We are weaning in an attempt to trial Lyrica as an alternative to gabapentin.  Tramadol causes significant sedation, limiting its use.  She has discontinued the use of the tramadol.  Additionally she does have benefit from anti-inflammatory medication, has been using aleve, has not trialed CANTRELL-2 inhibitors in the past.    Interval history 05/16/2018:      The patient has had x-rays of the lumbar spine which did not reveal any evidence of significant neuroforaminal stenosis with degenerative disc disease.  There is mild facet arthropathy.  She has escalated her gabapentin and is currently taking 2100 mg of gabapentin per day without any noticeable improvement but daytime somnolence.  She continues to have nonhealing ulcers and topical pain cream is helping to a limited degree over her leg in areas where there is no skin disruption.    Initial encounter:    Yamel Shah presents to the clinic for the evaluation of foot pain. The pain started 2 years ago following ulcers and symptoms have been worsening.    Brief history: History of scleroderma    Pain Description:    The pain is located in the both feet in the area of slowly healing chronic foot ulcers which were partly from venous insufficiency and stasis associated with  her scleroderma.  In her right lower extremity she describes radicular symptoms in the L4/5 distribution.    At BEST  5/10     At WORST  10/10 on the WORST day.      On average pain is rated as 8/10.     Today the pain is rated as 8/10    The pain is described as burning, sharp, shooting and constant      Symptoms interfere with daily activity, sleeping and work.     Exacerbating factors: Laying, Walking, Night Time, Morning and Getting out of bed/chair.      Mitigating factors medications.     Patient denies night fever/night sweats, urinary incontinence, bowel incontinence, significant weight loss, significant motor weakness and loss of sensations.  Patient denies any suicidal or homicidal ideations    Pain Medications:  Current:  Lyrica 150 mg daily    Tried in Past:  NSAIDs -Aleve, Mobic, Celebrex  TCA -Never  SNRI -Never  Anti-convulsants - Gabapentin, Lyrica  Muscle Relaxants -Never  Opioids-Tramadol    Physical Therapy/Home Exercise: no       report:  Reviewed and consistent with medication use as prescribed.    Pain Procedures: none    Chiropractor -never  Acupuncture - never  TENS unit -never  Spinal decompression -never  Joint replacement -never    Imaging:   X-ray lumbar spine 6/1/2016:  2 views: Alignment is normal. There is mild DJD. No fracture dislocation bone destruction seen.   Impression      Mild DJD.     Xray lumbar spine 4/2018:  COMPARISON:  June 2016    FINDINGS:  There is slight curvature of the lumbar spine.  The vertebral bodies are normally aligned and normal in height.  Mild disc space narrowing present at L5-S1.  There is mild facet degenerative change in the lower spine.  No significant osteophytic spurring present.  There is no change in alignment with flexion or extension.      Past Medical History:   Diagnosis Date    Abnormal Pap smear     Acid reflux     Allergy     Arthritis     GERD (gastroesophageal reflux disease)     History of migraine headaches     Hypertension      Idiopathic neuropathy 7/20/2012    ILD (interstitial lung disease) 11/6/2013    Iron deficiency anemia 3/18/2014    MRSA carrier     Osteopenia     Pulmonary fibrosis     Pulmonary hypertension     Raynaud's disease     Scleroderma, diffuse     Sjogren's syndrome     Vitamin D deficiency 11/14/2013     Past Surgical History:   Procedure Laterality Date    BREAST BIOPSY      Left, benign    CERVICAL CONIZATION   W/ LASER  1970    COLONOSCOPY      COLONOSCOPY N/A 3/29/2019    Procedure: COLONOSCOPY;  Surgeon: Annamaria Mendoza MD;  Location: [facility] ([address] [place] [address]);  Service: Endoscopy;  Laterality: N/A;    DILATION AND CURETTAGE OF UTERUS      ESOPHAGOGASTRODUODENOSCOPY      ESOPHAGOGASTRODUODENOSCOPY N/A 3/29/2019    Procedure: EGD (ESOPHAGOGASTRODUODENOSCOPY);  Surgeon: Annamaria Mendoza MD;  Location: [facility] ([address] [place] [address]);  Service: Endoscopy;  Laterality: N/A;  pulmonary htn    HYSTERECTOMY  1990    FRANDY (AUB, Fibroids), ovaries remain     Social History     Socioeconomic History    Marital status:      Spouse name: Lewis    Number of children: 4    Years of education: Not on file    Highest education level: Not on file   Occupational History    Occupation: -retired     Employer: [employer]     Comment:  district [employer]     Employer: RETIRED   Social Needs    Financial resource strain: Not very hard    Food insecurity:     Worry: Sometimes true     Inability: Never true    Transportation needs:     Medical: No     Non-medical: No   Tobacco Use    Smoking status: Never Smoker    Smokeless tobacco: Never Used   Substance and Sexual Activity    Alcohol use: Yes     Alcohol/week: 0.0 standard drinks     Frequency: Never     Comment: ocasionally    Drug use: No    Sexual activity: Yes     Partners: Male     Birth control/protection: None   Lifestyle    Physical activity:     Days per week: Not on file     Minutes per session: Not on file    Stress: Not on file    Relationships    Social connections:     Talks on phone: More than three times a week     Gets together: Once a week     Attends Protestant service: More than 4 times per year     Active member of club or organization: No     Attends meetings of clubs or organizations: Never     Relationship status:    Other Topics Concern    Are you pregnant or think you may be? No    Breast-feeding No   Social History Narrative         Family History   Problem Relation Age of Onset    Breast cancer Mother     No Known Problems Daughter     No Known Problems Son     No Known Problems Daughter     No Known Problems Son     Breast cancer Sister     Breast cancer Maternal Aunt     Osteoarthritis Brother     Melanoma Neg Hx     Colon cancer Neg Hx     Crohn's disease Neg Hx     Stomach cancer Neg Hx     Ulcerative colitis Neg Hx     Rectal cancer Neg Hx     Irritable bowel syndrome Neg Hx     Esophageal cancer Neg Hx     Celiac disease Neg Hx     Ovarian cancer Neg Hx        Review of patient's allergies indicates:   Allergen Reactions    Sulfa (sulfonamide antibiotics)      Other reaction(s): Rash       Current Outpatient Medications   Medication Sig    albuterol (VENTOLIN HFA) 90 mcg/actuation inhaler Inhale 2 puffs into the lungs every 4 (four) hours as needed for Wheezing. Rescue    carboxymethylcellulose sodium (REFRESH TEARS) 0.5 % Drop Apply 1-2 drops to every as needed    conjugated estrogens (PREMARIN) vaginal cream Place 1 g vaginally twice a week. Place pea sized amount to vagina twice per day for two weeks then twice per week therafter    ergocalciferol (ERGOCALCIFEROL) 50,000 unit Cap Take 1 capsule (50,000 Units total) by mouth every 7 days.    multivitamin capsule Take 1 capsule by mouth once daily.     mycophenolate mofetil (CELLCEPT) 200 mg/mL SusR Take 2.5 mLs (500 mg total) by mouth 2 (two) times daily.    NIFEdipine (ADALAT CC) 90 MG TbSR TAKE 1 TABLET(90 MG) BY MOUTH  EVERY DAY    omeprazole (PRILOSEC) 40 MG capsule Take 1 capsule (40 mg total) by mouth 2 (two) times daily before meals.    pravastatin (PRAVACHOL) 20 MG tablet TAKE 1 TABLET BY MOUTH EVERY EVENING    pregabalin (LYRICA) 100 MG capsule Take 1 capsule (100 mg total) by mouth 2 (two) times daily.    spironolactone (ALDACTONE) 25 MG tablet Take 1 tablet (25 mg total) by mouth once daily.    tadalafil (ADCIRCA) 20 mg Tab Take 2 tablets (40 mg total) by mouth once daily.    ALPRAZolam (XANAX) 0.25 MG tablet Take 1 tablet (0.25 mg total) by mouth 3 (three) times daily as needed for Anxiety. Do not take regularly (Patient not taking: Reported on 1/10/2020)    b complex vitamins tablet Take 1 tablet by mouth once daily.    ferrous sulfate 325 (65 FE) MG EC tablet Take 1 tablet (325 mg total) by mouth 3 (three) times daily. (Patient not taking: Reported on 1/10/2020)    fluoride, sodium, (PREVIDENT 5000) 1.1 % Pste     levoFLOXacin (LEVAQUIN) 500 MG tablet Take 1 tablet (500 mg total) by mouth once daily. (Patient not taking: Reported on 1/10/2020)    methylPREDNISolone (MEDROL DOSEPACK) 4 mg tablet use as directed (Patient not taking: Reported on 1/10/2020)    pravastatin (PRAVACHOL) 20 MG tablet Take 1 tablet (20 mg total) by mouth every evening.     No current facility-administered medications for this visit.        REVIEW OF SYSTEMS:    GENERAL:  No weight loss, malaise or fevers.  HEENT:   No recent changes in vision or hearing  NECK:  Negative for lumps,  difficulty with swallowing associated with scleroderma.  RESPIRATORY:  Negative for cough, wheezing or shortness of breath, patient denies any recent URI. Albuterol (history of ILD)  CARDIOVASCULAR:  Negative for chest pain, leg swelling or palpitations.  GI:  Negative for abdominal discomfort, blood in stools or black stools or change in bowel habits. GERD controlled zantac  MUSCULOSKELETAL:  See HPI.  SKIN:   Chronic low healing venous stasis ulcerations  "to bilateral lower extremities   PSYCH:  No mood disorder or recent psychosocial stressors.  Patients sleep is not disturbed secondary to pain.  HEMATOLOGY/LYMPHOLOGY:   History of iron deficiency anemia, takes daily iron supplementation.    ENDO: No history of diabetes or thyroid dysfunction  NEURO:   No history of headaches, syncope, paralysis, seizures or tremors.  All other reviewed and negative other than HPI.    OBJECTIVE:    /60   Pulse 76   Temp 97.4 °F (36.3 °C) (Oral)   Resp 18   Ht 5' 6" (1.676 m)   Wt 70 kg (154 lb 5.2 oz)   BMI 24.91 kg/m²     PHYSICAL EXAMINATION:    GENERAL: Well appearing, in no acute distress, alert and oriented x3.  PSYCH:  Mood and affect appropriate.  SKIN: Tight skin associated with scleroderma.   HEAD/FACE:  Normocephalic, atraumatic. Cranial nerves grossly intact.  CV: RRR with palpation of the radial artery.  PULM: No evidence of respiratory difficulty, symmetric chest rise.  BACK:  There is no pain with palpation over the facet joints of the lumbar spine bilaterally. Limited ROM with mild pain on extension. Positive facet loading bilaterally.    EXTREMITIES: Contractures over on bilateral hands with ulcerations to knuckles, right greater than left. Dressings noted to bilateral feet.   MUSCULOSKELETAL:  There is no pain to palpation over the greater trochanteric bursa bilaterally.  FABERs test is positive bilaterally.  FADIRs test is negative.   Bilateral lower extremity strength testing limited secondary to pain in the feet.    NEURO: Bilateral lower extremity coordination and muscle stretch reflexes are physiologic and symmetric. Decreased sensation to BLE. Plantar response are downgoing. No clonus.  No loss of sensation is noted.  GAIT: Antalgic, ambulates without assistance         Lab Results   Component Value Date    WBC 4.61 01/10/2020    HGB 13.2 01/10/2020    HCT 42.8 01/10/2020    MCV 97 01/10/2020     (L) 01/10/2020     BMP  Lab Results "   Component Value Date     01/10/2020    K 3.6 01/10/2020     01/10/2020    CO2 25 01/10/2020    BUN 17 01/10/2020    CREATININE 0.8 01/10/2020    CALCIUM 9.2 01/10/2020    ANIONGAP 8 01/10/2020    ESTGFRAFRICA >60.0 01/10/2020    EGFRNONAA >60.0 01/10/2020         ASSESSMENT: 69 y.o. year old female with pain, consistent with     Encounter Diagnoses   Name Primary?    Chronic pain disorder Yes    Cutaneous scleroderma     Idiopathic neuropathy        PLAN:     - Previous imaging was reviewed and discussed with the patient today.    - Increase Lyrica to 150 mg daily.     - Continue to follow up with wound care.     - Continues home exercise routine.     - RTC in 3 months.     - Dr. King was consulted on the patient and agrees with this plan.    The above plan and management options were discussed at length with patient. Patient is in agreement with the above and verbalized understanding.     Viktoriya Camara NP  01/17/2020

## 2020-01-20 ENCOUNTER — PATIENT OUTREACH (OUTPATIENT)
Dept: OTHER | Facility: OTHER | Age: 70
End: 2020-01-20

## 2020-01-20 NOTE — PROGRESS NOTES
"Digital Medicine: Health  Follow-Up    Patient reports she is well. Reports she resumed cellcept medication recently and reports feeling tired.     Denies symptoms of hypertension, including HA, chest pains, SOB and changes in vision. Stated no changes to lifestyle since last call.     Declined to set new health goal today.     The history is provided by the patient.     Follow Up  Follow-up reason(s): reading review and routine education      Readings are trending down       INTERVENTION(S)  recommended diet modifications, recommend physical activity, encouragement/support, denied further coaching, denied resources and denied questions    PLAN  patient verbalizes understanding, demonstrates understanding via teach back, patient amenable to changes and continue monitoring      There are no preventive care reminders to display for this patient.    Last 5 Patient Entered Readings                                      Current 30 Day Average: 136/69     Recent Readings 1/20/2020 1/19/2020 1/16/2020 1/14/2020 1/13/2020    SBP (mmHg) 140 130 121 134 129    DBP (mmHg) 74 65 64 61 66    Pulse 86 75 71 80 71                      Diet Screening   No change to diet.      Patient stated "no change" to eating habits since last call, declined to discuss further today.     Physical Activity Screening   No change to exercise routine.        Patient stated "no change" to physical activity since last call, declined to discuss further today.     Medication Adherence Screening   She misses doses: never        SDOH  "

## 2020-01-22 NOTE — TELEPHONE ENCOUNTER
"[facility] Staff; MD Dr. Raphael Suarez and staff,     [facility] is unable to fill Tadalafil for Ms. Shah.  Her insurance requires that she continue to receive it from [facility].     Please send Tadalafil prescription to [facility] (added to patient's EPIC profile).     To complete this in EPIC, the original order MUST be discontinued and re-typed as a new prescription with the updated pharmacy listed. Clicking "reorder" will continue to route the rx to [facility] even if the pharmacy is changed. Please opt the patient out of [facility] when the BPA is fired.     Thank you for allowing us to participate in the care for your patients.  Please inform us if there is anything further we can do to assist.        "

## 2020-01-23 RX ORDER — TADALAFIL 20 MG/1
40 TABLET ORAL DAILY
Qty: 60 TABLET | Refills: 11 | Status: SHIPPED | OUTPATIENT
Start: 2020-01-23 | End: 2020-05-22 | Stop reason: SDUPTHER

## 2020-01-27 ENCOUNTER — PATIENT OUTREACH (OUTPATIENT)
Dept: OTHER | Facility: OTHER | Age: 70
End: 2020-01-27
Payer: MEDICARE

## 2020-01-27 ENCOUNTER — LAB VISIT (OUTPATIENT)
Dept: LAB | Facility: HOSPITAL | Age: 70
End: 2020-01-27
Attending: INTERNAL MEDICINE
Payer: MEDICARE

## 2020-01-27 DIAGNOSIS — I10 ESSENTIAL HYPERTENSION: Primary | ICD-10-CM

## 2020-01-27 DIAGNOSIS — D69.6 THROMBOCYTOPENIA: ICD-10-CM

## 2020-01-27 LAB
BASOPHILS # BLD AUTO: 0.03 K/UL (ref 0–0.2)
BASOPHILS NFR BLD: 0.5 % (ref 0–1.9)
DIFFERENTIAL METHOD: ABNORMAL
EOSINOPHIL # BLD AUTO: 0 K/UL (ref 0–0.5)
EOSINOPHIL NFR BLD: 0.3 % (ref 0–8)
ERYTHROCYTE [DISTWIDTH] IN BLOOD BY AUTOMATED COUNT: 15.4 % (ref 11.5–14.5)
HCT VFR BLD AUTO: 44 % (ref 37–48.5)
HGB BLD-MCNC: 13.3 G/DL (ref 12–16)
IMM GRANULOCYTES # BLD AUTO: 0.01 K/UL (ref 0–0.04)
IMM GRANULOCYTES NFR BLD AUTO: 0.2 % (ref 0–0.5)
LYMPHOCYTES # BLD AUTO: 1.5 K/UL (ref 1–4.8)
LYMPHOCYTES NFR BLD: 23.5 % (ref 18–48)
MCH RBC QN AUTO: 28.8 PG (ref 27–31)
MCHC RBC AUTO-ENTMCNC: 30.2 G/DL (ref 32–36)
MCV RBC AUTO: 95 FL (ref 82–98)
MONOCYTES # BLD AUTO: 0.5 K/UL (ref 0.3–1)
MONOCYTES NFR BLD: 7.4 % (ref 4–15)
NEUTROPHILS # BLD AUTO: 4.2 K/UL (ref 1.8–7.7)
NEUTROPHILS NFR BLD: 68.1 % (ref 38–73)
NRBC BLD-RTO: 0 /100 WBC
PLATELET # BLD AUTO: 187 K/UL (ref 150–350)
PMV BLD AUTO: 11.4 FL (ref 9.2–12.9)
RBC # BLD AUTO: 4.62 M/UL (ref 4–5.4)
WBC # BLD AUTO: 6.22 K/UL (ref 3.9–12.7)

## 2020-01-27 PROCEDURE — 85025 COMPLETE CBC W/AUTO DIFF WBC: CPT

## 2020-01-27 PROCEDURE — 36415 COLL VENOUS BLD VENIPUNCTURE: CPT

## 2020-02-07 ENCOUNTER — TELEPHONE (OUTPATIENT)
Dept: PHARMACY | Facility: CLINIC | Age: 70
End: 2020-02-07

## 2020-02-19 ENCOUNTER — TELEPHONE (OUTPATIENT)
Dept: GASTROENTEROLOGY | Facility: CLINIC | Age: 70
End: 2020-02-19

## 2020-02-19 NOTE — TELEPHONE ENCOUNTER
----- Message from Fabiola Quigley sent at 2/19/2020 10:45 AM CST -----  I spoke to my supervisor who advised we would not know that information due to pre-service just checking the codes on the Medicare payable list, in which code 0240T is no longer a billable code. She asked if you could possibly check with the doctor to get a valid code?  If so, I can update the new code on my end.        ----- Message -----  From: Jeannette Pringle NP  Sent: 2/19/2020   9:56 AM CST  To: Fabiola Quigley    I do not know how to change the CPT code. This is the one we always use for this procedure. Also, since it is linked to a scheduled procedure, I cannot edit the order. What CPT code is billable?    SEAN Machado  ----- Message -----  From: Karyn Rodney MA  Sent: 2/19/2020   8:04 AM CST  To: Jeannette Pringle NP        ----- Message -----  From: Fabiola Quigley  Sent: 2/18/2020   8:30 PM CST  To: Yary Machado Staff    Good Evening,     I am working to get this case cleared for DOS 3/4/2020, but I can not get it approved with CPT code 0240T. Can you please update with a billable CPT code and remove 0240T?  Please use one of the valid payable codes for a Pr Esophageal Motility Study, Manometry W 3-D Pressure Topography.  Thanks     Fabiola CARLISLE  [phone]

## 2020-02-20 ENCOUNTER — OFFICE VISIT (OUTPATIENT)
Dept: INTERNAL MEDICINE | Facility: CLINIC | Age: 70
End: 2020-02-20
Payer: MEDICARE

## 2020-02-20 VITALS
HEIGHT: 65 IN | DIASTOLIC BLOOD PRESSURE: 62 MMHG | WEIGHT: 158.31 LBS | SYSTOLIC BLOOD PRESSURE: 112 MMHG | BODY MASS INDEX: 26.38 KG/M2 | HEART RATE: 76 BPM | TEMPERATURE: 98 F

## 2020-02-20 DIAGNOSIS — R13.14 PHARYNGOESOPHAGEAL DYSPHAGIA: ICD-10-CM

## 2020-02-20 DIAGNOSIS — K21.9 GASTROESOPHAGEAL REFLUX DISEASE, ESOPHAGITIS PRESENCE NOT SPECIFIED: ICD-10-CM

## 2020-02-20 DIAGNOSIS — I77.1 ARTERIAL INSUFFICIENCY WITH ISCHEMIC ULCER: ICD-10-CM

## 2020-02-20 DIAGNOSIS — J84.9 ILD (INTERSTITIAL LUNG DISEASE): ICD-10-CM

## 2020-02-20 DIAGNOSIS — I10 ESSENTIAL HYPERTENSION: ICD-10-CM

## 2020-02-20 DIAGNOSIS — M34.9 SCLERODERMA WITH PULMONARY INVOLVEMENT: ICD-10-CM

## 2020-02-20 DIAGNOSIS — L97.529 SKIN ULCERS OF BOTH FEET: Primary | ICD-10-CM

## 2020-02-20 DIAGNOSIS — L97.519 SKIN ULCERS OF BOTH FEET: Primary | ICD-10-CM

## 2020-02-20 DIAGNOSIS — L98.499 ARTERIAL INSUFFICIENCY WITH ISCHEMIC ULCER: ICD-10-CM

## 2020-02-20 DIAGNOSIS — G60.9 IDIOPATHIC NEUROPATHY: ICD-10-CM

## 2020-02-20 PROCEDURE — 99213 OFFICE O/P EST LOW 20 MIN: CPT | Mod: PBBFAC,PO | Performed by: INTERNAL MEDICINE

## 2020-02-20 PROCEDURE — 99214 PR OFFICE/OUTPT VISIT, EST, LEVL IV, 30-39 MIN: ICD-10-PCS | Mod: S$PBB,,, | Performed by: INTERNAL MEDICINE

## 2020-02-20 PROCEDURE — 99214 OFFICE O/P EST MOD 30 MIN: CPT | Mod: S$PBB,,, | Performed by: INTERNAL MEDICINE

## 2020-02-20 PROCEDURE — 99999 PR PBB SHADOW E&M-EST. PATIENT-LVL III: CPT | Mod: PBBFAC,,, | Performed by: INTERNAL MEDICINE

## 2020-02-20 PROCEDURE — 99999 PR PBB SHADOW E&M-EST. PATIENT-LVL III: ICD-10-PCS | Mod: PBBFAC,,, | Performed by: INTERNAL MEDICINE

## 2020-02-20 NOTE — PROGRESS NOTES
Subjective:       Patient ID: Yamel Shah is a 69 y.o. female.    Chief Complaint: Follow-up (4 mo)    HPI   The patient is still noting reflux approximately 3 nights a week.  This has been a chronic problem.  It has persisted in spite of changing medications.  She experiences burning in the throat and coughing at night in association with the reflux.  She has been avoiding dairy products, artificial sweeteners, and carbonated beverages.  She does complain of bloating although her appetite remains good.    She has a chronic ft ulcers.  The left foot ulcer has healed.  She still notes 1 ulcer on the right foot.  She has been performing her own wound care at home.    Active medical conditions include scleroderma artificial insufficiency with ischemic ulcer, hypertension, GERD, interstitial lung disease, iron deficiency anemia, idiopathic neuropathy, osteopenia, pharyngoesophageal dysphagia, Raynaud's disease, vitamin-D deficiency, urinary incontinence.    Review of Systems   Constitutional: Negative for activity change and unexpected weight change.   HENT: Positive for trouble swallowing. Negative for hearing loss and rhinorrhea.    Eyes: Negative for discharge and visual disturbance.   Respiratory: Positive for wheezing. Negative for chest tightness.    Cardiovascular: Negative for chest pain and palpitations.   Gastrointestinal: Negative for blood in stool, constipation, diarrhea and vomiting.   Endocrine: Negative for polydipsia and polyuria.   Genitourinary: Negative for difficulty urinating, dysuria, hematuria and menstrual problem.   Musculoskeletal: Positive for arthralgias. Negative for joint swelling and neck pain.   Skin: Positive for wound.   Neurological: Negative for weakness and headaches.   Psychiatric/Behavioral: Negative for confusion and dysphoric mood.       Objective:      Physical Exam   Constitutional: She is oriented to person, place, and time. She appears well-developed and  well-nourished. No distress.   The patient has gained 3 lb since 07/16/2019.   HENT:   Head: Normocephalic and atraumatic.   Eyes: Conjunctivae and EOM are normal. No scleral icterus.   Neck: Normal range of motion. Neck supple. No JVD present. No thyromegaly present.   Cardiovascular: Normal rate, regular rhythm, normal heart sounds and intact distal pulses. Exam reveals no gallop and no friction rub.   No murmur heard.  Pulmonary/Chest: Effort normal. No respiratory distress. She has no wheezes. She has no rales.   Course breath sounds are noted at both bases posteriorly.   Abdominal: Soft. Bowel sounds are normal. She exhibits no mass. There is no tenderness.   Musculoskeletal: She exhibits no tenderness.   Contractures of the fingers are present bilaterally.   Lymphadenopathy:     She has no cervical adenopathy.   Neurological: She is alert and oriented to person, place, and time.   Skin: Skin is warm and dry. No rash noted.   Left ankle and foot:  ulcer site has healed.  Right foot:  Ulcer with a clean base is present along the lateral malleolar area.   Psychiatric: She has a normal mood and affect. Her behavior is normal.   Nursing note and vitals reviewed.      Assessment:       1. Skin ulcers of both feet    2. Idiopathic neuropathy    3. Essential hypertension    4. Arterial insufficiency with ischemic ulcer    5. ILD (interstitial lung disease)    6. Scleroderma with pulmonary involvement    7. Gastroesophageal reflux disease, esophagitis presence not specified    8. Pharyngoesophageal dysphagia        Plan:     Yamel was seen today for follow-up.  Current medications will be continued.  The patient will follow up with her gastroenterologist regarding reflux symptoms.  The patient is to return to clinic in 4 months.    Diagnoses and all orders for this visit:    Skin ulcers of both feet    Idiopathic neuropathy    Essential hypertension    Arterial insufficiency with ischemic ulcer    ILD (interstitial  lung disease)    Scleroderma with pulmonary involvement    Gastroesophageal reflux disease, esophagitis presence not specified    Pharyngoesophageal dysphagia

## 2020-03-04 ENCOUNTER — HOSPITAL ENCOUNTER (OUTPATIENT)
Facility: HOSPITAL | Age: 70
Discharge: HOME | End: 2020-03-04
Attending: INTERNAL MEDICINE | Admitting: INTERNAL MEDICINE
Payer: MEDICARE

## 2020-03-04 VITALS
SYSTOLIC BLOOD PRESSURE: 146 MMHG | DIASTOLIC BLOOD PRESSURE: 65 MMHG | HEIGHT: 65 IN | BODY MASS INDEX: 25.99 KG/M2 | HEART RATE: 75 BPM | TEMPERATURE: 98 F | RESPIRATION RATE: 14 BRPM | OXYGEN SATURATION: 100 % | WEIGHT: 156 LBS

## 2020-03-04 DIAGNOSIS — R13.10 DYSPHAGIA: ICD-10-CM

## 2020-03-04 PROCEDURE — 91037 ESOPH IMPED FUNCTION TEST: CPT | Performed by: INTERNAL MEDICINE

## 2020-03-04 PROCEDURE — 91010 ESOPHAGUS MOTILITY STUDY: CPT | Mod: 26,,, | Performed by: INTERNAL MEDICINE

## 2020-03-04 PROCEDURE — 91037 PR GERD TST W/ NASAL IMPEDENCE ELECTROD: ICD-10-PCS | Mod: 26,,, | Performed by: INTERNAL MEDICINE

## 2020-03-04 PROCEDURE — 25000003 PHARM REV CODE 250: Performed by: INTERNAL MEDICINE

## 2020-03-04 PROCEDURE — 91010 ESOPHAGUS MOTILITY STUDY: CPT | Performed by: INTERNAL MEDICINE

## 2020-03-04 PROCEDURE — 91037 ESOPH IMPED FUNCTION TEST: CPT | Mod: 26,,, | Performed by: INTERNAL MEDICINE

## 2020-03-04 PROCEDURE — 91010 PR ESOPHAGEAL MOTILITY STUDY, MA2METRY: ICD-10-PCS | Mod: 26,,, | Performed by: INTERNAL MEDICINE

## 2020-03-04 RX ORDER — LIDOCAINE HYDROCHLORIDE 20 MG/ML
JELLY TOPICAL ONCE
Status: COMPLETED | OUTPATIENT
Start: 2020-03-04 | End: 2020-03-04

## 2020-03-04 RX ADMIN — LIDOCAINE HYDROCHLORIDE 10 ML: 20 JELLY TOPICAL at 09:03

## 2020-03-05 NOTE — OR NURSING
24 hour pH impedance catheter removed on 03/05/2020 at 12:31.  Skin intact.  Catheter at 36.5 at right naris.  Patient tolerated well.

## 2020-03-06 ENCOUNTER — TELEPHONE (OUTPATIENT)
Dept: GASTROENTEROLOGY | Facility: CLINIC | Age: 70
End: 2020-03-06

## 2020-03-06 PROCEDURE — 91037 PR GERD TST W/ NASAL IMPEDENCE ELECTROD: ICD-10-PCS | Mod: 26,,, | Performed by: INTERNAL MEDICINE

## 2020-03-06 PROCEDURE — 91037 ESOPH IMPED FUNCTION TEST: CPT | Mod: 26,,, | Performed by: INTERNAL MEDICINE

## 2020-03-07 NOTE — TELEPHONE ENCOUNTER
Manometry Results:  Low LES pressure with complete relaxation  Absent contractility (Scleroderma esophagus)  Incomplete bolus clearance  Hiatal hernia   Abnormal multiple rapid swallows test  No significant difference with upright swallows  Unable to properly perform applesauce and nancy cracker swallows without double swallowing  Evidence of residual liquid in esophagus after 150 cc bolus    pH Impedence Results:    Significant acid reflux off   Total percent time pH less than 4 (< 4.2): 41  Percent time pH less than 4 Upright (<6.3):32  Percent time pH less than 4 Recumbent (<1.2):53  DeMeester Score (<14.7): 133  No weakly acidic reflux  No non-acidic reflux  Impedance results likely affected by poor esophageal motility  Good correlation between regurgitation and susan reflux episodes    Please let patient know that the pH Impedence study shows  Severe acid reflux    Please let patient know that the manometry shows  Weak muscles of the esophagus from scleroderma    Recommendation:  Continue omeprazole 40mg BID   -Use gaviscon with alginate.  Chew 1-2 tablets after meals and at bedtime as needed (up to 4x a day).  For best results follow by a half glass of water or other liquid.   Gaviscon is available in liquid formulation.  Take 1-4 tablespoons 4x a day for Regular Strength and 1-4 teaspoonfuls 4x a day for Extra Strength.  The special european formulation with alginate appears more effective and is usually available on [employer].  You can try regular Gaviscon if you are unable to find the one with alginate      -We need to check GES to see if her stomach is slow and contributing to acid reflux   -Fu in June w me

## 2020-03-07 NOTE — [employer] PATIENT INSTRUCTIONS
Discharge Summary/Instructions after an Endoscopic Procedure  Patient Name: Yamel Shah  Patient MRN: [id number]  Patient YOB: 1950 Friday, March 06, 2020  Annamaria Mendoza MD  RESTRICTIONS:  During your procedure today, you received medications for sedation.  These   medications may affect your judgment, balance and coordination.  Therefore,   for 24 hours, you have the following restrictions:   - DO NOT drive a car, operate machinery, make legal/financial decisions,   sign important papers or drink alcohol.    ACTIVITY:  Today: no heavy lifting, straining or running due to procedural   sedation/anesthesia.  The following day: return to full activity including work.  DIET:  Eat and drink normally unless instructed otherwise.     TREATMENT FOR COMMON SIDE EFFECTS:  - Mild abdominal pain, nausea, belching, bloating or excessive gas:  rest,   eat lightly and use a heating pad.  - Sore Throat: treat with throat lozenges and/or gargle with warm salt   water.  - Because air was used during the procedure, expelling large amounts of air   from your rectum or belching is normal.  - If a bowel prep was taken, you may not have a bowel movement for 1-3 days.    This is normal.  SYMPTOMS TO WATCH FOR AND REPORT TO YOUR PHYSICIAN:  1. Abdominal pain or bloating, other than gas cramps.  2. Chest pain.  3. Back pain.  4. Signs of infection such as: chills or fever occurring within 24 hours   after the procedure.  5. Rectal bleeding, which would show as bright red, maroon, or black stools.   (A tablespoon of blood from the rectum is not serious, especially if   hemorrhoids are present.)  6. Vomiting.  7. Weakness or dizziness.  GO DIRECTLY TO THE NEAREST EMERGENCY ROOM IF YOU HAVE ANY OF THE FOLLOWING:      Difficulty breathing              Chills and/or fever over 101 F   Persistent vomiting and/or vomiting blood   Severe abdominal pain   Severe chest pain   Black, tarry stools   Bleeding- more than one  tablespoon   Any other symptom or condition that you feel may need urgent attention  Your doctor recommends these additional instructions:  If any biopsies were taken, your doctors clinic will contact you in 1 to 2   weeks with any results.  - Return to my office as previously scheduled.  For questions, problems or results please call your physician - Annamaria Mendoza MD at Work:  [phone].  [facility] [place], EMERGENCY ROOM PHONE NUMBER: [phone]  IF A COMPLICATION OR EMERGENCY SITUATION ARISES AND YOU ARE UNABLE TO REACH   YOUR PHYSICIAN - GO DIRECTLY TO THE EMERGENCY ROOM.  Annamaria Mendoza MD  3/6/2020 6:18:26 PM  This report has been verified and signed electronically.  [employer]

## 2020-03-09 ENCOUNTER — TELEPHONE (OUTPATIENT)
Dept: GASTROENTEROLOGY | Facility: CLINIC | Age: 70
End: 2020-03-09

## 2020-03-09 ENCOUNTER — PATIENT OUTREACH (OUTPATIENT)
Dept: OTHER | Facility: OTHER | Age: 70
End: 2020-03-09

## 2020-03-09 DIAGNOSIS — K21.9 GASTROESOPHAGEAL REFLUX DISEASE, ESOPHAGITIS PRESENCE NOT SPECIFIED: Primary | ICD-10-CM

## 2020-03-09 NOTE — TELEPHONE ENCOUNTER
----- Message from Tiera Navarro MA sent at 3/9/2020 10:08 AM CDT -----  Contact: [phone] (M)  Pt said she had a missed a call aand  thought that it may be about her test results.     Please try again  [phone] (M)

## 2020-03-09 NOTE — TELEPHONE ENCOUNTER
----- Message from Tiera Navarro MA sent at 3/9/2020 10:08 AM CDT -----  Contact: [phone] (M)  Pt said she had a missed a call aand  thought that it may be about her test results.     Please try again  [phone] (M)

## 2020-03-09 NOTE — TELEPHONE ENCOUNTER
----- Message from Tiera Navarro MA sent at 3/9/2020 10:08 AM CDT -----  Contact: [phone] (M)  Pt said she had a missed a call aand  thought that it may be about her test results.     Please try again  [phone] (M)

## 2020-03-09 NOTE — PROGRESS NOTES
"Digital Medicine: Health  Follow-Up    Patient reports she is well, no complaints. Denies symptoms of hypertension, including HA, chest pains, SOB and changes in vision.   Reminded patient to charge BP device 1-2 times per month.     The history is provided by the patient.     Follow Up  Follow-up reason(s): reading review and routine education      Readings are trending up due to anxiety from procedure last week .        INTERVENTION(S)  recommended diet modifications, recommend physical activity, encouragement/support, denied further coaching, denied resources and denied questions    PLAN  patient verbalizes understanding, demonstrates understanding via teach back, patient amenable to changes and continue monitoring      There are no preventive care reminders to display for this patient.    Last 5 Patient Entered Readings                                      Current 30 Day Average: 138/68     Recent Readings 3/6/2020 3/2/2020 3/1/2020 2/29/2020 2/28/2020    SBP (mmHg) 132 125 145 140 135    DBP (mmHg) 62 65 68 63 67    Pulse 89 83 70 78 67                      Diet Screening   Breakfast is typically between. Cereal with banana, tea.  Lunch is typically between. "not sure"- turkey hot dog .    Dinner is typically between. Red beans and rice .    Snacks are typically. Apple sauce .    Patient reports eating or drinking the following: soda, fruit, water, caffeine, packaged/processed foods, deli meat and oat milk, coffee, candy- snickers (100mg)    She eats 3 meals and 2 snacks per day.      Reports she is working to increase vegetable intake, discussed ideas to eat frozen vegetables because when she purchases fresh vegetables, "they go bad too quickly".    Reports she monitors salt intake, reports using "very little salt".     Read nutrition label on hot dog during call, 480 mg per hot dog + bun (250mg)  + mustard.     Reports she is planning to plant tomatoes in her backyard this spring.    Intervention(s): low " sodium diet education    Physical Activity Screening   No change to exercise routine.    When asked if exercising, patient responded: yes  Patient has limited mobility: old injury  Patient has the following chronic pain: foot ulcers    Patient participates in the following activities: walking    She identified the following barriers to physical activity: pain/injury/recent surgery    Reports she walks around [employer] and other big [employer].     Reports ulcers on her feet limit movement.     Medication Adherence Screening   She did not miss a dose this month.      SDOH

## 2020-03-10 ENCOUNTER — TELEPHONE (OUTPATIENT)
Dept: GASTROENTEROLOGY | Facility: CLINIC | Age: 70
End: 2020-03-10

## 2020-03-10 NOTE — TELEPHONE ENCOUNTER
Spoke with patient about scheduling her GES appt also letting her know of the upcoming appt with ms stahl was cancel and her f/u will be in June

## 2020-03-13 ENCOUNTER — TELEPHONE (OUTPATIENT)
Dept: PHARMACY | Facility: CLINIC | Age: 70
End: 2020-03-13

## 2020-03-13 RX ORDER — SPIRONOLACTONE 25 MG/1
TABLET ORAL
Qty: 30 TABLET | Refills: 11 | Status: SHIPPED | OUTPATIENT
Start: 2020-03-13 | End: 2020-11-06

## 2020-03-15 ENCOUNTER — PATIENT MESSAGE (OUTPATIENT)
Dept: RHEUMATOLOGY | Facility: CLINIC | Age: 70
End: 2020-03-15

## 2020-03-23 ENCOUNTER — DOCUMENTATION ONLY (OUTPATIENT)
Dept: REHABILITATION | Facility: HOSPITAL | Age: 70
End: 2020-03-23

## 2020-03-23 DIAGNOSIS — R13.14 PHARYNGOESOPHAGEAL DYSPHAGIA: ICD-10-CM

## 2020-03-23 NOTE — PROGRESS NOTES
"  Outpatient Therapy Discharge Summary   Discharge Date: 3/23/2020   Name: Yamel Shah  Clinic Number: [id number]  Therapy Diagnosis: No diagnosis found.  Physician: Natalie Dominguez NP  Physician Orders: Ambulatory Consult to Speech Therapy; Concern for possible aspiration  Medical Diagnosis: Recurrent pneumonia [J18.9]  Gastroesophageal reflux disease, esophagitis presence not specified [K21.9]     Visit # / Visits Authorized:  4/ 20 (1 prior to this authorization)  Date of Evaluation:  11/18/2019   Insurance Authorization Period: 10/2/19 to 12/31/19  Plan of Care Certification:    11/18/2019 to 1/3/19   Extended POC:  n/a   Progress Note: due 12/18/19   Visits Cancelled: 0  No Show Visits: 1    Assessment    Assessment of Current Status: per assessment 12/5/19 "Pt performed exercises independently with verbal cues prior to onset of exercises. As Yamel is no longer experiencing overt s/sx of aspiration, it is likely that her swallowing complaints are esophageal primary which is best managed by GI. ST on hold until reflux is managed. Yamel to follow up with SLP after GI consult.  "     Goals:   Short Term Goals: (3 weeks) Current Progress:   1. Pt will complete effortful swallow 60-90x per session to improve pharyngeal stripping wave and tongue base retraction. Goal Met Intermittently / Discontinue    2. Pt willcomplete Chin Tuck Against Resistance (CTAR) hold for 45 seconds immediately followed by 45 repetitions to improve hyolaryngeal elevation and excursion and PE segment opening.  Goal Met / Discontinue     3. Pt will complete supraglottic swallow 30-45x per session to improve laryngeal vestibule closure. Goal Met Intermittently / Discontinue    4. Pt will complete mendelsohn maneuver 30-45x per session to improve hyolaryneal elevation / excursion.  Goal Met Intermittently / Discontinue      Long Term Goals:  1.  She  will maintain adequate hydration/nutrition with optimum safety and efficiency of " swallowing function on PO intake without overt signs and symptoms of aspiration for regular diet level. Goal Met / Discontinue       2. She  will utilize compensatory strategies with optimum safety and efficiency of swallowing function on PO intake without overt signs and symptoms of aspiration for regular diet level.   Goal Met / Discontinue      LILIANA NOMS (National Outcome Measure System)  NOMS Swallowing  LEVEL 6: Swallowing is safe, and the individual eats and drinks independently and may rarely require minimal cueing. The individual usually self-cues when difficulty occurs. May need to avoid specific food items (e.g., popcorn and nuts), or require additional time (due to dysphagia).      Discharge reason: Other:  Pt has met pharyngeal goals for dysphagia. Esophageal dysphagia is being managed by GI.    Plan   This patient is discharged from Speech Therapy    [place] CARMEN Tracey, CCC-SLP   3/23/2020

## 2020-03-27 ENCOUNTER — LAB VISIT (OUTPATIENT)
Dept: LAB | Facility: HOSPITAL | Age: 70
End: 2020-03-27
Attending: INTERNAL MEDICINE
Payer: MEDICARE

## 2020-03-27 DIAGNOSIS — M34.9 SCLERODERMA: ICD-10-CM

## 2020-03-27 LAB
ALBUMIN SERPL BCP-MCNC: 3.8 G/DL (ref 3.5–5.2)
ALP SERPL-CCNC: 115 U/L (ref 55–135)
ALT SERPL W/O P-5'-P-CCNC: 13 U/L (ref 10–44)
ANION GAP SERPL CALC-SCNC: 10 MMOL/L (ref 8–16)
AST SERPL-CCNC: 24 U/L (ref 10–40)
BASOPHILS # BLD AUTO: 0.04 K/UL (ref 0–0.2)
BASOPHILS NFR BLD: 0.8 % (ref 0–1.9)
BILIRUB SERPL-MCNC: 0.3 MG/DL (ref 0.1–1)
BUN SERPL-MCNC: 19 MG/DL (ref 8–23)
CALCIUM SERPL-MCNC: 9.1 MG/DL (ref 8.7–10.5)
CHLORIDE SERPL-SCNC: 108 MMOL/L (ref 95–110)
CO2 SERPL-SCNC: 23 MMOL/L (ref 23–29)
CREAT SERPL-MCNC: 0.8 MG/DL (ref 0.5–1.4)
CRP SERPL-MCNC: 6.5 MG/L (ref 0–8.2)
DIFFERENTIAL METHOD: ABNORMAL
EOSINOPHIL # BLD AUTO: 0.1 K/UL (ref 0–0.5)
EOSINOPHIL NFR BLD: 1 % (ref 0–8)
ERYTHROCYTE [DISTWIDTH] IN BLOOD BY AUTOMATED COUNT: 16.5 % (ref 11.5–14.5)
ERYTHROCYTE [SEDIMENTATION RATE] IN BLOOD BY WESTERGREN METHOD: 45 MM/HR (ref 0–36)
EST. GFR  (AFRICAN AMERICAN): >60 ML/MIN/1.73 M^2
EST. GFR  (NON AFRICAN AMERICAN): >60 ML/MIN/1.73 M^2
GLUCOSE SERPL-MCNC: 105 MG/DL (ref 70–110)
HCT VFR BLD AUTO: 38.9 % (ref 37–48.5)
HGB BLD-MCNC: 13 G/DL (ref 12–16)
IMM GRANULOCYTES # BLD AUTO: 0.01 K/UL (ref 0–0.04)
IMM GRANULOCYTES NFR BLD AUTO: 0.2 % (ref 0–0.5)
LYMPHOCYTES # BLD AUTO: 2 K/UL (ref 1–4.8)
LYMPHOCYTES NFR BLD: 41.2 % (ref 18–48)
MCH RBC QN AUTO: 31.6 PG (ref 27–31)
MCHC RBC AUTO-ENTMCNC: 33.4 G/DL (ref 32–36)
MCV RBC AUTO: 94 FL (ref 82–98)
MONOCYTES # BLD AUTO: 0.4 K/UL (ref 0.3–1)
MONOCYTES NFR BLD: 8.9 % (ref 4–15)
NEUTROPHILS # BLD AUTO: 2.4 K/UL (ref 1.8–7.7)
NEUTROPHILS NFR BLD: 47.9 % (ref 38–73)
NRBC BLD-RTO: 0 /100 WBC
PLATELET # BLD AUTO: 190 K/UL (ref 150–350)
PMV BLD AUTO: 13.2 FL (ref 9.2–12.9)
POTASSIUM SERPL-SCNC: 3.7 MMOL/L (ref 3.5–5.1)
PROT SERPL-MCNC: 8.4 G/DL (ref 6–8.4)
RBC # BLD AUTO: 4.12 M/UL (ref 4–5.4)
SODIUM SERPL-SCNC: 141 MMOL/L (ref 136–145)
WBC # BLD AUTO: 4.93 K/UL (ref 3.9–12.7)

## 2020-03-27 PROCEDURE — 86140 C-REACTIVE PROTEIN: CPT

## 2020-03-27 PROCEDURE — 85025 COMPLETE CBC W/AUTO DIFF WBC: CPT

## 2020-03-27 PROCEDURE — 36415 COLL VENOUS BLD VENIPUNCTURE: CPT | Mod: PN

## 2020-03-27 PROCEDURE — 80053 COMPREHEN METABOLIC PANEL: CPT

## 2020-03-27 PROCEDURE — 85652 RBC SED RATE AUTOMATED: CPT

## 2020-03-31 ENCOUNTER — TELEPHONE (OUTPATIENT)
Dept: PAIN MEDICINE | Facility: CLINIC | Age: 70
End: 2020-03-31

## 2020-03-31 ENCOUNTER — TELEPHONE (OUTPATIENT)
Dept: ADMINISTRATIVE | Facility: OTHER | Age: 70
End: 2020-03-31

## 2020-03-31 NOTE — TELEPHONE ENCOUNTER
----- Message from Venus Denise sent at 3/31/2020 12:10 PM CDT -----  Contact: DANA GARCÍA [[id number]]  Type:  Patient Returning Call    Who Called: DANA GARCÍA [[id number]]    Who Left Message for Patient: Danna    Does the patient know what this is regarding?: Appointment     Can the clinic reply in [facility]: No    Best Call Back Number: [phone]    Additional Information: N/A

## 2020-04-03 ENCOUNTER — PATIENT MESSAGE (OUTPATIENT)
Dept: RHEUMATOLOGY | Facility: CLINIC | Age: 70
End: 2020-04-03

## 2020-04-13 ENCOUNTER — TELEPHONE (OUTPATIENT)
Dept: TRANSPLANT | Facility: CLINIC | Age: 70
End: 2020-04-13

## 2020-04-13 NOTE — TELEPHONE ENCOUNTER
----- Message from DINA Ocampo, RN sent at 4/13/2020  9:56 AM CDT -----  [facility] called and can't reach pt. Requesting updated contact info.    Their number is [phone].

## 2020-04-15 ENCOUNTER — TELEPHONE (OUTPATIENT)
Dept: PHARMACY | Facility: CLINIC | Age: 70
End: 2020-04-15

## 2020-04-16 ENCOUNTER — TELEPHONE (OUTPATIENT)
Dept: GASTROENTEROLOGY | Facility: CLINIC | Age: 70
End: 2020-04-16

## 2020-04-17 ENCOUNTER — TELEPHONE (OUTPATIENT)
Dept: GASTROENTEROLOGY | Facility: CLINIC | Age: 70
End: 2020-04-17

## 2020-04-17 NOTE — TELEPHONE ENCOUNTER
----- Message from Lela Mooney sent at 4/17/2020  3:39 PM CDT -----  Karyn -- pt is returning your call [phone]

## 2020-05-03 ENCOUNTER — PATIENT MESSAGE (OUTPATIENT)
Dept: RHEUMATOLOGY | Facility: CLINIC | Age: 70
End: 2020-05-03

## 2020-05-04 ENCOUNTER — TELEPHONE (OUTPATIENT)
Dept: GASTROENTEROLOGY | Facility: CLINIC | Age: 70
End: 2020-05-04

## 2020-05-04 ENCOUNTER — PATIENT OUTREACH (OUTPATIENT)
Dept: OTHER | Facility: OTHER | Age: 70
End: 2020-05-04

## 2020-05-04 ENCOUNTER — TELEPHONE (OUTPATIENT)
Dept: RHEUMATOLOGY | Facility: CLINIC | Age: 70
End: 2020-05-04

## 2020-05-04 ENCOUNTER — PATIENT MESSAGE (OUTPATIENT)
Dept: RHEUMATOLOGY | Facility: CLINIC | Age: 70
End: 2020-05-04

## 2020-05-04 ENCOUNTER — HOSPITAL ENCOUNTER (OUTPATIENT)
Dept: RADIOLOGY | Facility: HOSPITAL | Age: 70
Discharge: HOME | End: 2020-05-04
Attending: INTERNAL MEDICINE
Payer: MEDICARE

## 2020-05-04 DIAGNOSIS — K21.9 GASTROESOPHAGEAL REFLUX DISEASE, ESOPHAGITIS PRESENCE NOT SPECIFIED: ICD-10-CM

## 2020-05-04 PROCEDURE — 78264 NM GASTRIC EMPTYING: ICD-10-PCS | Mod: 26,,, | Performed by: RADIOLOGY

## 2020-05-04 PROCEDURE — 78264 GASTRIC EMPTYING IMG STUDY: CPT | Mod: 26,,, | Performed by: RADIOLOGY

## 2020-05-04 PROCEDURE — A9541 TC99M SULFUR COLLOID: HCPCS

## 2020-05-04 NOTE — TELEPHONE ENCOUNTER
Pls let pt know that hir/her gastric emptying scan shows significant delay if emptying of food from the stomach.  He/she should eat six small, low fat and low fiber meals per day.  We will address this further at next clinic appointment, please arrange next available tele health apt

## 2020-05-05 ENCOUNTER — OFFICE VISIT (OUTPATIENT)
Dept: RHEUMATOLOGY | Facility: CLINIC | Age: 70
End: 2020-05-05
Payer: MEDICARE

## 2020-05-05 ENCOUNTER — PATIENT MESSAGE (OUTPATIENT)
Dept: RHEUMATOLOGY | Facility: CLINIC | Age: 70
End: 2020-05-05

## 2020-05-05 VITALS
HEIGHT: 66 IN | WEIGHT: 158 LBS | BODY MASS INDEX: 25.39 KG/M2 | HEART RATE: 92 BPM | DIASTOLIC BLOOD PRESSURE: 62 MMHG | SYSTOLIC BLOOD PRESSURE: 112 MMHG

## 2020-05-05 DIAGNOSIS — H93.19 TINNITUS, UNSPECIFIED LATERALITY: ICD-10-CM

## 2020-05-05 DIAGNOSIS — J84.9 ILD (INTERSTITIAL LUNG DISEASE): ICD-10-CM

## 2020-05-05 DIAGNOSIS — L97.529 SKIN ULCERS OF BOTH FEET: ICD-10-CM

## 2020-05-05 DIAGNOSIS — I87.2 VENOUS INSUFFICIENCY OF BOTH LOWER EXTREMITIES: ICD-10-CM

## 2020-05-05 DIAGNOSIS — L97.519 SKIN ULCERS OF BOTH FEET: ICD-10-CM

## 2020-05-05 DIAGNOSIS — I27.29 PULMONARY HYPERTENSION ASSOCIATED WITH SYSTEMIC DISORDER: ICD-10-CM

## 2020-05-05 DIAGNOSIS — M34.9 SCLERODERMA: Primary | ICD-10-CM

## 2020-05-05 DIAGNOSIS — K21.9 GASTROESOPHAGEAL REFLUX DISEASE, ESOPHAGITIS PRESENCE NOT SPECIFIED: ICD-10-CM

## 2020-05-05 PROCEDURE — 99213 OFFICE O/P EST LOW 20 MIN: CPT | Mod: 95,GC,, | Performed by: STUDENT IN AN ORGANIZED HEALTH CARE EDUCATION/TRAINING PROGRAM

## 2020-05-05 PROCEDURE — 99213 PR OFFICE/OUTPT VISIT, EST, LEVL III, 20-29 MIN: ICD-10-PCS | Mod: 95,GC,, | Performed by: STUDENT IN AN ORGANIZED HEALTH CARE EDUCATION/TRAINING PROGRAM

## 2020-05-05 ASSESSMENT — ROUTINE ASSESSMENT OF PATIENT INDEX DATA (RAPID3)
FATIGUE SCORE: 3
PAIN SCORE: 4
MDHAQ FUNCTION SCORE: 1
PATIENT GLOBAL ASSESSMENT SCORE: 6
AM STIFFNESS SCORE: 0, NO
TOTAL RAPID3 SCORE: 4.44
PSYCHOLOGICAL DISTRESS SCORE: 3.3

## 2020-05-05 NOTE — PROGRESS NOTES
Pre chart  Answers for HPI/ROS submitted by the patient on 5/4/2020   fever: No  eye redness: No  headaches: No  shortness of breath: No  chest pain: No  trouble swallowing: No  diarrhea: No  constipation: No  unexpected weight change: No  genital sore: No  dysuria: No  During the last 3 days, have you had a skin rash?: No  Bruises or bleeds easily: No  cough: No

## 2020-05-05 NOTE — PROGRESS NOTES
I have personally reviewed the history, unable to examine as audio only and discussed assessment and plan with fellow. Dr. Madison did participate in video as well as audio.

## 2020-05-05 NOTE — PATIENT INSTRUCTIONS
Eating Heart-Healthy Food: Using the DASH Plan    Eating for your heart doesnt have to be hard or boring. You just need to know how to make healthier choices. The DASH eating plan has been developed to help you do just that. DASH stands for Dietary Approaches to Stop Hypertension. It is a plan that has been proven to be healthier for your heart and to lower your risk for high blood pressure. It can also help lower your risk for cancer, heart disease, osteoporosis, and diabetes.  Choosing from each food group  Choose foods from each of the food groups below each day. Try to get the recommended number of servings for each food group. The serving numbers are based on a diet of 2,000 calories a day. Talk to your doctor if youre unsure about your calorie needs. Along with getting the correct servings, the DASH plan also recommends a sodium intake less than 2,300 mg per day.        Grains  Servings: 6 to 8 a day  A serving is:  · 1 slice bread  · 1 ounce dry cereal  · Half a cup cooked rice, pasta or cereal  Best choices: Whole grains and any grains high in fiber. Vegetables  Servings: 4 to 5 a day  A serving is:  · 1 cup raw leafy vegetable  · Half a cup cut-up raw or cooked vegetable  · Half a cup vegetable juice  Best choices: Fresh or frozen vegetables prepared without added salt or fat.   Fruits  Servings: 4 to 5 a day  A serving is:  · 1 medium fruit  · One-quarter cup dried fruit  · Half a cup fresh, frozen, or canned fruit  · Half a cup of 100% fruit juices  Best choices: A variety of fresh fruits of different colors. Whole fruits are a better choice than fruit juices. Low-fat or fat-free dairy  Servings: 2 to 3 a day  A serving is:  · 1 cup milk  · 1 cup yogurt  · One and a half ounces cheese  Best choices: Skim or 1% milk, low-fat or fat-free yogurt or buttermilk, and low-fat cheeses.         Lean meats, poultry, fish  Servings: 6 or fewer a day  A serving is:  · 1 ounce cooked meats, poultry, or fish  · 1  egg  Best choices: Lean poultry and fish. Trim away visible fat. Broil, grill, roast, or boil instead of frying. Remove skin from poultry before eating. Limit how much red meat you eat.  Nuts, seeds, beans  Servings: 4 to 5 a week  A serving is:  · One-third cup nuts (one and a half ounces)  · 2 tablespoons nut butter or seeds  · Half a cup cooked dry beans or legumes  Best choices: Dry roasted nuts with no salt added, lentils, kidney beans, garbanzo beans, and whole charles beans.   Fats and oils  Servings: 2 to 3 a day  A serving is:  · 1 teaspoon vegetable oil  · 1 teaspoon soft margarine  · 1 tablespoon mayonnaise  · 2 tablespoons salad dressing  Best choices: Nut and vegetable oils (nontropical vegetable oils), such as olive and canola oil. Sweets  Servings: 5 a week or fewer  A serving is:  · 1 tablespoon sugar, maple syrup, or honey  · 1 tablespoon jam or jelly  · 1 half-ounce jelly beans (about 15)  · 1 cup lemonade  Best choices: Dried fruit can be a satisfying sweet. Choose low-fat sweets. And watch your serving sizes!      For more on the DASH eating plan, visit:  www.nhlbi.nih.gov/health/health-topics/topics/dash   Date Last Reviewed: 6/1/2016  © 9842-7065 [employer]. [address] [place] [address], [place] [address]. All rights reserved. This information is not intended as a substitute for professional medical care. Always follow your healthcare professional's instructions.

## 2020-05-05 NOTE — PROGRESS NOTES
Subjective:       Patient ID: Yamel Shah is a 69 y.o. female.    Chief Complaint: F/u for systemic scleroderma disease    The patient location is: home  The chief complaint leading to consultation is: f/u  Visit type: audiovisual  Total time spent with patient: 30 min  Each patient to whom he or she provides medical services by telemedicine is:  (1) informed of the relationship between the physician and patient and the respective role of any other health care provider with respect to management of the patient; and (2) notified that he or she may decline to receive medical services by telemedicine and may withdraw from such care at any time.    Follow up for Systemic scleroderma. Last seen by Dr. Ahuja and myself on 1/14/2020.     Interval Hx: Since last visit, pt states she is doing well. No more infections and has remained on mycophenolate. She is supposed to be on mycophenolate 500 mg BID (2.5 mLs BID) but states she is forgetting to take evening doses so she takes all 5 mLs in the AM. States she feels her skin is looser and sees more wrinkles. LE swelling and ulcers have improved, only 1 ulcer remaining on R ankle. Monitors BP at home, well controlled today. Complains of ringing of her ears which is new and bothersome. Recent labs unremarkable, CRP wnl and ESR stable 43.     Seen by pulmonary Dr. Barry 1/13/2020 for follow-up of SSc-ILD.  Dyspnea is at her baseline and only present with heavy exertion.  Her skin and joint findings remains stable with her current therapy.  She has been able to be more active as the wound on her foot continues to heal.  She did have a few bouts of pneumonia a couple months ago.  She states that she developed a productive cough and was given antibiotics which eventually improved her symptoms.  She does endorse significant GERD and is scheduled to see Dr. Mendoza.  She believes that a large portion of her pulmonary symptoms are related to aspiration. PFTs reviewed. FVC  "and DLCO are stable therefore Dr. Leigh does not suspect any progression of her ILD.  She suspects that her occasional acute respiratory symptoms are related to her GERD and encouraged GI follow-up with Dr. Mendoza. Monitor PFTs ever 6 months.  Given her GI issues and stability in her PFTs, do not think she would benefit from OFEV at this time. Continue with Adcirca.     10/28 6 MWT : Six minute walk distance is 415.14 meters (1362 feet) with moderate dyspnea. During exercise, there was no desaturation while breathing room air. Both blood pressure and heart rate remained stable with walking. The patient reported non-pulmonary symptoms during exercise. Since the previous study in September 2019, exercise capacity is significantly improved. Based upon age and body mass index, exercise capacity is normal.     Follows with GI Dr. Mendoza, Patient is scheduled for 24 hr pH impedance test and Esophageal Manometry on 3/4/20. Showed "her gastric emptying scan shows significant delay if emptying of food from the stomach.  she should eat six small, low fat and low fiber meals per day."     Follows with pain management, Dr. Celis and NP Viktoriya Camara. Was on Lyrica to 100 mg BID, now off. Pt able to wean herself off as her pain has improved.     Pt states she has been walking a lot per Dr. Claudio's ([facility] surgery) request. Will f/u in the future but for now LE ulcers improving and swelling decreased. Saw Dr. Lim for Pulm HTN, continues on Adcirca (tadalifil). UTD immunizations.           Initial Hx:   67YF with Systemic scleroderma, diagnosed 1983- She has been seeing Dr. Ahuja for over 10 years.( +SSA, +SCL-70, -RNAP3, ILD). She has stable ILD on imaging 9/2015 and had mild exercise induced pulmonary hypertension in 2013 that has resolved on 6/2015 RHC and most recent ECHO done 3/2019 shows normal EF with grade 2 diastolic dysfunction, increased sPAP 41 compared to 28mmHg in 06/2018. PFTs 4/2016 showed normal DLCO, RV, " "FEV1 with mild restriction. Repeat PFTs 03/2018 show normal DLco, decreased RV (89-->64), SVC remains the same at 70 and FVC 70. Repeat PFTs 03/2019 showed FVC 60, SVC 64, RV 61, DLco 82.      03/2019 CT chest/abd was done which showed stable appearing chronic interstitial lung disease consistent with patient's known history of scleroderma; there is new tree-in-bud opacity noted in the right upper lobe which while nonspecific can be seen in the setting of inflammatory infectious processes such as aspiration. Pt was referred to Dr Barry for evaluation of ILD.   As per Dr Barry note " Patient with significant scleroderma multiorgan involvement, Her PFTs reveal trend towards decrease in FVC,A 6MWT is not available at this time,Patient does have poor exercise function because of pain in the feet when she ambulates.  - She may benefit from starting on Immunosuppression with mycophenolate, although risks and benefit need to be reviewed further since she has had scleroderma since 1983. We initiated and provided the patient with basic information on the medication. She is going to review with Dr. Ahuja as well.   - Recommend to repeat PFTs w/DLCO and 6MWT (If patient can tolerate) every 6 months. May enroll in Pulmonary rehab once leg ulcers improve.     Seen by Dr Lim 03/2019 as per note "Given appearance of diastolic dysfxn on echo today and uncontrolled HTN/swelling, will go ahead and add aldactone 25mg daily today- needs BMP/BNP this am and again in 1 wk,no PH medicine changes today. From a PH standpoint, based on her echo and last RHC she appears to be doing very well- PFTs to be repeated per Dr Ahuja, chest imaging stable"     She had seen Vascular surgery in 03/2018 who will consider EVLT once ulcers have healed.  Pt is currently on nifedipine 90mg, pravastatin 20mg and adcirca 40mg     Recently retired in Dec 2018, worked as a .     Review of Systems   Constitutional: Negative for chills, " "fatigue, fever and unexpected weight change.   HENT: Negative for hearing loss and trouble swallowing.    Eyes: Negative for redness and visual disturbance.   Respiratory: Negative for cough and shortness of breath.    Cardiovascular: Negative for chest pain.   Gastrointestinal: Negative for abdominal pain, constipation and diarrhea.   Genitourinary: Negative for dysuria and genital sores.   Musculoskeletal: Negative for arthralgias, back pain and joint swelling.   Skin: Positive for wound. Negative for color change and rash.   Neurological: Negative for weakness and headaches.   Hematological: Does not bruise/bleed easily.   Psychiatric/Behavioral: Negative for agitation. The patient is not nervous/anxious.          Objective:   /62   Pulse 92   Ht 5' 6" (1.676 m)   Wt 71.7 kg (158 lb)   BMI 25.50 kg/m²      Physical Exam   Not performed 2/2 telemedicine.      SPEP 1/10/2020  Increased total protein.   Increased gamma globulin, polyclonal.      JASON 1/10/2020  No monoclonal peaks identified.      US Venous 06/2019  Impression:   Right Leg:  Color flow evaluation of the lower extremity demonstrates fully compressible and patent veins; however, due to heavy pitting edema, one of the paired peroneal veins cannot be appreciated which could suggest chronic occlusive thrombus versus   poor imaging. There is no evidence of venous thrombosis in the remaining deep or superficial veins.  Significant reflux is noted in the GSV including the saphenofemoral junction (SFJ) and the SSV including the saphenopopliteal junction (SPJ). There is no   evidence of reflux in the Accessory vein. Many branches are noted throughout the entire course of the GSV and SSV.  Incidental finding of significant reflux is noted in the FV and POP V.    Left Leg:  Color flow evaluation of the lower extremity demonstrates chronic occlusive thrombus in a small segment of the GSV BK. There is no evidence of venous thrombosis in the remaining " deep or superficial veins.  Significant reflux is noted in the   GSV including the saphenofemoral junction (SFJ) and the SSV including the saphenopopliteal junction (SPJ). There is no evidence of reflux in the Accessory vein. Noted is a diameter decrease in the GSV from 4.8 mm to 2.6 mm at the level of the distal   thigh and 1.9 mm at the knee.  Multiple branches of the GSV are also noted throughout its entire course. Incidental finding of significant reflux is noted in the FV and POP V.     EGD 01/2017  Normal esophagus. Two biopsies were obtained in                         the upper third of the esophagus.                        - Z-line irregular. Biopsied.                        - Esophageal polyp(s) were found. Resected and                         retrieved.                        - Normal stomach.                        - Congested duodenal mucosa. Biopsied.  Recommendation:  - Await pathology results.                        - Discharge patient to home (ambulatory).                        - Resume previous diet.                        - Continue present medications.                        - Repeat the upper endoscopy in 6 months to assure                         complete removal of polyp at GE junction.        EGD 03/2019  Impression:    - Dilation in the entire esophagus.                        - Z-line irregular, 38 cm from the incisors.                         Biopsied.                        - 3 cm hiatal hernia.                        - Benign-appearing esophageal stenosis.                        - Normal stomach.                        - A few gastric polyps. Biopsied.                        - Normal examined duodenum. Biopsied.  Recommendation:  - Await pathology results.                        - Discharge patient to home (with escort).                        - Resume previous diet.     C-scope 03/2019  Impression:     - Five 2 to 5 mm polyps in the transverse colon,                         removed with  a jumbo cold forceps. Resected and                         retrieved.                        - The entire examined colon is normal.                        - The distal rectum and anal verge are normal on                         retroflexion view.                        - The examined portion of the ileum was normal.  Recommendation:    - Discharge patient to home (with escort).     CT chest/abd 03/2019                   Impression         Stable appearing chronic interstitial lung disease consistent with patient's known history of scleroderma; it is worth noting that there is new tree-in-bud opacity noted in the right upper lobe which while nonspecific can be seen in the setting of inflammatory infectious processes such as aspiration.    Persistently fluid-filled esophagus which can place the patient at risk for aspiration         DEXA 09/2018  Osteopenia. There is a 11.9% risk of a major osteoporotic fracture and a 2.6% risk of hip fracture in the next 10 years (FRAX).  Compared with previous DXA, BMD at the lumbar spine has remained stable, and the BMD at the total hip has decreased by -7.4%     2D ECHO 03/2019  · Normal left ventricular systolic function. The estimated ejection fraction is 60%  · Grade II (moderate) left ventricular diastolic dysfunction consistent with pseudonormalization.  · Elevated left atrial pressure.  · No wall motion abnormalities.  · Normal right ventricular systolic function.  · Mild left atrial enlargement.  · Mild mitral regurgitation.  · The estimated PA systolic pressure is 41 mm Hg  · Normal central venous pressure (3 mm Hg).     CT chest 09/2015  Findings:  Examination of the vascular and soft tissue structures at the base of the neck is unremarkable.  The thoracic aorta maintains normal caliber, contour, and course without significant atherosclerotic calcification within its course.  The heart is not enlarged and there is no evidence of pericardial effusion. The esophagus is  mildly dilated and   retained pneumatized secretions are identified within the dependent portion of the esophagus placing the patient at risk for aspiration and also suggestive esophageal dysmotility associated with scleroderma or reflux. There is no axillary, mediastinal,   or hilar lymphadenopathy.    A benign appearing but enlarged left axillary lymph node is identified, which appears stable to the prior examination dated 11/27/07.  Scattered normal sized and benign appearing mediastinal lymphadenopathy is also appreciated, also grossly similar to   the prior examination.     The lungs again are symmetrically expanded and demonstrate chronic generalized lung disease with small opacities and reticulations in a peripheral predominance.  This again is most notable in the lung bases were there is dilatation of small airways and faint groundglass opacity.  Findings appear grossly stable to the prior chest CT in 2007 and are consistent with interstitial lung disease as can be seen with scleroderma, pulmonary fibrosis or interstitial pneumonitis.  No focal mass lesion is identified.Limited views of the abdomen demonstrate nothing unusual on this noncontrast examination.  The osseous structures demonstrate mild degenerative changes and a stable appearing compression deformity of the T9 vertebral body.       PFTs       FVC     SVC      RV     DLco     1/10/20    74                                72  3/12/19    60         64         61      82  3/6/18      64         70         64      112  4/8/16      70         70         89       84  9/4/15      66         62         72       71  1/19/15    66           2/14/14    64                                 87         Assessment:       1. Scleroderma    2. Tinnitus, unspecified laterality    3. ILD (interstitial lung disease)    4. Skin ulcers of both feet    5. Venous insufficiency of both lower extremities    6. Gastroesophageal reflux disease, esophagitis presence not  specified    7. Pulmonary hypertension associated with systemic disorder            Plan:       Problem List Items Addressed This Visit        Derm    Skin ulcers of both feet       Pulmonary    Pulmonary hypertension associated with systemic disorder (Chronic)    ILD (interstitial lung disease)       Cardiac/Vascular    Venous insufficiency of both lower extremities       GI    GERD (gastroesophageal reflux disease)      Other Visit Diagnoses     Scleroderma    -  Primary    Relevant Orders    Urinalysis    Protein / creatinine ratio, urine    Ambulatory referral/consult to Physical/Occupational Therapy    Tinnitus, unspecified laterality        Relevant Orders    Ambulatory referral/consult to ENT        Follow up for Systemic scleroderma.      Interval Hx: Since last visit, pt states she is doing well. No more infections and has remained on mycophenolate. She is supposed to be on mycophenolate 500 mg BID (2.5 mLs BID) but states she is forgetting to take evening doses so she takes all 5 mLs in the AM. States she feels her skin is looser and sees more wrinkles. LE swelling and ulcers have improved, only 1 ulcer remaining on R ankle. Monitors BP at home, well controlled today. Complains of ringing of her ears which is new and bothersome. Recent labs unremarkable, CRP wnl and ESR stable 43.     Seen by pulmonary Dr. Barry 1/13/2020 for follow-up of SSc-ILD.  Dyspnea is at her baseline and only present with heavy exertion.  Her skin and joint findings remains stable with her current therapy.  She has been able to be more active as the wound on her foot continues to heal.  She did have a few bouts of pneumonia a couple months ago.  She states that she developed a productive cough and was given antibiotics which eventually improved her symptoms.  She does endorse significant GERD and is scheduled to see Dr. Mendoza.  She believes that a large portion of her pulmonary symptoms are related to aspiration. PFTs reviewed.  "FVC and DLCO are stable therefore Dr. Leigh does not suspect any progression of her ILD.  She suspects that her occasional acute respiratory symptoms are related to her GERD and encouraged GI follow-up with Dr. Mendoza. Monitor PFTs ever 6 months.  Given her GI issues and stability in her PFTs, do not think she would benefit from OFEV at this time. Continue with Adcirca.     10/28 6 MWT : Six minute walk distance is 415.14 meters (1362 feet) with moderate dyspnea. During exercise, there was no desaturation while breathing room air. Both blood pressure and heart rate remained stable with walking. The patient reported non-pulmonary symptoms during exercise. Since the previous study in September 2019, exercise capacity is significantly improved. Based upon age and body mass index, exercise capacity is normal.     Follows with GI Dr. Mendoza, Patient is scheduled for 24 hr pH impedance test and Esophageal Manometry on 3/4/20. Showed "her gastric emptying scan shows significant delay if emptying of food from the stomach.  she should eat six small, low fat and low fiber meals per day."     Follows with pain management, Dr. Celis and NP Viktoriya Camara. Was on Lyrica to 100 mg BID, now off. Pt able to wean herself off as her pain has improved.     Pt states she has been walking a lot per Dr. Claudio's ([facility] surgery) request. Will f/u in the future but for now LE ulcers improving and swelling decreased. Saw Dr. Lim for Pulm HTN, continues on Adcirca (tadalifil). UTD immunizations.     Recent labs: Unremarkable CBC and CMP. Persistently elevated ESR 45 > 43 and CRP wnl. 12/8/19 CXR: No interval detrimental change when compared to radiograph dated 11/12/2019.  Stable chronic findings.     - Urinalysis; Standing  - Protein / creatinine ratio, urine; Standing  - Ambulatory referral/consult to Physical/Occupational Therapy; Future  - Ambulatory referral/consult to ENT; Future      Plan:  - Reviewed recent CBC, CMP, ESR, CRP. " Inflammatory markers with persistently elevated ESR and wnl CRP. Check UA and UPCR with standing labs  - Mycophenolate 500 mg BID (liquid formulation= 2.5 ml BID). Discussed importance of taking medication twice a day rather than daily  - Discussed to hold mycophenolate for any infections or antibiotic use  - pt to monitor BP at home and message if consistently elevated > 120/80  - cont following with pulm Dr. Leigh with repeat PFTs in 6 months and with GI Dr. Mendoza. Information on low fiber diet and DASH diet sent to pt via portal.  - UTD vaccines  - ENT referral to tinnitus  - PT/OT for deformity of hands and need of hand splint     Discussed with Dr. Ahuja. RTC 3 months.     Gretchen Madison DO  Rheumatology, PGY4

## 2020-05-08 ENCOUNTER — PATIENT MESSAGE (OUTPATIENT)
Dept: TRANSPLANT | Facility: CLINIC | Age: 70
End: 2020-05-08

## 2020-05-13 ENCOUNTER — TELEPHONE (OUTPATIENT)
Dept: PHARMACY | Facility: CLINIC | Age: 70
End: 2020-05-13

## 2020-05-14 ENCOUNTER — PATIENT MESSAGE (OUTPATIENT)
Dept: TRANSPLANT | Facility: CLINIC | Age: 70
End: 2020-05-14

## 2020-05-19 NOTE — TELEPHONE ENCOUNTER
Confirmed [employer] shipment  to arrive to patient home . Patient stated she has enough medication on hand to last until Friday,  and no missed doses in the past month. Address and  verified. $15 copay [facility][id number] ([id number]).

## 2020-05-20 ENCOUNTER — OFFICE VISIT (OUTPATIENT)
Dept: OTOLARYNGOLOGY | Facility: CLINIC | Age: 70
End: 2020-05-20
Payer: MEDICARE

## 2020-05-20 ENCOUNTER — CLINICAL SUPPORT (OUTPATIENT)
Dept: AUDIOLOGY | Facility: CLINIC | Age: 70
End: 2020-05-20
Payer: MEDICARE

## 2020-05-20 VITALS — BODY MASS INDEX: 26.47 KG/M2 | HEIGHT: 66 IN | WEIGHT: 164.69 LBS

## 2020-05-20 DIAGNOSIS — H90.3 SENSORINEURAL HEARING LOSS (SNHL) OF BOTH EARS: Primary | ICD-10-CM

## 2020-05-20 DIAGNOSIS — H93.12 TINNITUS, LEFT: ICD-10-CM

## 2020-05-20 DIAGNOSIS — H90.3 SENSORINEURAL HEARING LOSS (SNHL), BILATERAL: Primary | ICD-10-CM

## 2020-05-20 DIAGNOSIS — H93.19 TINNITUS, UNSPECIFIED LATERALITY: ICD-10-CM

## 2020-05-20 PROCEDURE — 99999 PR PBB SHADOW E&M-EST. PATIENT-LVL III: CPT | Mod: PBBFAC,,, | Performed by: OTOLARYNGOLOGY

## 2020-05-20 PROCEDURE — 99204 PR OFFICE/OUTPT VISIT, NEW, LEVL IV, 45-59 MIN: ICD-10-PCS | Mod: S$PBB,,, | Performed by: OTOLARYNGOLOGY

## 2020-05-20 PROCEDURE — 99211 OFF/OP EST MAY X REQ PHY/QHP: CPT | Mod: PBBFAC,25 | Performed by: AUDIOLOGIST

## 2020-05-20 PROCEDURE — 92567 TYMPANOMETRY: CPT | Mod: PBBFAC | Performed by: AUDIOLOGIST

## 2020-05-20 PROCEDURE — 92557 COMPREHENSIVE HEARING TEST: CPT | Mod: PBBFAC | Performed by: AUDIOLOGIST

## 2020-05-20 PROCEDURE — 99999 PR PBB SHADOW E&M-EST. PATIENT-LVL I: ICD-10-PCS | Mod: PBBFAC,,, | Performed by: AUDIOLOGIST

## 2020-05-20 PROCEDURE — 99999 PR PBB SHADOW E&M-EST. PATIENT-LVL III: ICD-10-PCS | Mod: PBBFAC,,, | Performed by: OTOLARYNGOLOGY

## 2020-05-20 PROCEDURE — 99204 OFFICE O/P NEW MOD 45 MIN: CPT | Mod: S$PBB,,, | Performed by: OTOLARYNGOLOGY

## 2020-05-20 PROCEDURE — 99999 PR PBB SHADOW E&M-EST. PATIENT-LVL I: CPT | Mod: PBBFAC,,, | Performed by: AUDIOLOGIST

## 2020-05-20 PROCEDURE — 99213 OFFICE O/P EST LOW 20 MIN: CPT | Mod: PBBFAC,27,25 | Performed by: OTOLARYNGOLOGY

## 2020-05-20 RX ORDER — OMEPRAZOLE 40 MG/1
CAPSULE, DELAYED RELEASE ORAL
COMMUNITY
Start: 2020-05-11 | End: 2020-10-30 | Stop reason: SDUPTHER

## 2020-05-20 NOTE — LETTER
May 20, 2020      Gretchen Madison MD  [address]  [facility] [address]           [facility] Otorhinol[facility]ology  [address]  [facility] [address]  Phone: [phone]  Fax: [phone]          Patient: Yamel Shah   MR Number: [id number]   YOB: 1950   Date of Visit: 5/20/2020       Dear Dr. Gretchen Madison:    Thank you for referring Yamel Shah to me for evaluation. Attached you will find relevant portions of my assessment and plan of care.    If you have questions, please do not hesitate to call me. I look forward to following Yamel Shah along with you.    Sincerely,    Mc Odom MD    Enclosure  CC:  No Recipients    If you would like to receive this communication electronically, please contact [email or web address] or [phone] to request more information on [employer] access.    For providers and/or their staff who would like to refer a patient to [facility], please contact us through our one-stop-shop provider referral line, [facility], at [phone].    If you feel you have received this communication in error or would no longer like to receive these types of communications, please e-mail [email or web address]

## 2020-05-20 NOTE — PROGRESS NOTES
Name: Yamel Shah  [facility] Number: [id number]  Date of Treatment: 05/20/2020   Diagnosis:   Encounter Diagnoses   Name Primary?    Tinnitus, unspecified laterality     Sensorineural hearing loss (SNHL) of both ears Yes       Subjective:  70 y/o F presents for evaluation of tinnitus that has been ongoing for many years. She started taking medications including Cellcept and Aldactone about a year ago. Since that time she feels like her tinnitus has been getting worse. She feels like her hearing is not a problem. Denies any otalgia, otorrhea, or vertigo.     Past Medical History:   Diagnosis Date    Abnormal Pap smear     Acid reflux     Allergy     Arthritis     GERD (gastroesophageal reflux disease)     History of migraine headaches     Hypertension     Idiopathic neuropathy 7/20/2012    ILD (interstitial lung disease) 11/6/2013    Iron deficiency anemia 3/18/2014    MRSA carrier     Osteopenia     Pulmonary fibrosis     Pulmonary hypertension     Raynaud's disease     Scleroderma, diffuse     Sjogren's syndrome     Vitamin D deficiency 11/14/2013     Past Surgical History:   Procedure Laterality Date    24 HOUR IMPEDANCE PH MONITORING OF ESOPHAGUS IN PATIENT NOT TAKING ACID REDUCING MEDICATIONS N/A 3/4/2020    Procedure: IMPEDANCE PH STUDY, ESOPHAGEAL, 24 HOUR, IN PATIENT NOT TAKING ACID REDUCING MEDICATION;  Surgeon: Annamaria Mendoza MD;  Location: [facility] (4TH FLR);  Service: Endoscopy;  Laterality: N/A;  OFF PPI/H2 Blocker   Motility Studies   Hold Narcotics x 1 days   Hold TCA x 1 days  2/26 - LVM attempting to confirm appt  2/27 - Confirmed appt    BREAST BIOPSY      Left, benign    CERVICAL CONIZATION   W/ LASER  1970    COLONOSCOPY      COLONOSCOPY N/A 3/29/2019    Procedure: COLONOSCOPY;  Surgeon: Annamaria Mendoza MD;  Location: [facility] (2ND FLR);  Service: Endoscopy;  Laterality: N/A;    DILATION AND CURETTAGE OF UTERUS      ESOPHAGEAL MANOMETRY WITH MEASUREMENT OF  IMPEDANCE N/A 3/4/2020    Procedure: MANOMETRY, ESOPHAGUS, WITH IMPEDANCE MEASUREMENT;  Surgeon: Annamaria Mendoza MD;  Location: [facility] (4TH FLR);  Service: Endoscopy;  Laterality: N/A;  OFF PPI/H2 Blocker   Motility Studies   Hold Narcotics x 1 days   Hold TCA x 1 days    ESOPHAGOGASTRODUODENOSCOPY      ESOPHAGOGASTRODUODENOSCOPY N/A 3/29/2019    Procedure: EGD (ESOPHAGOGASTRODUODENOSCOPY);  Surgeon: Annamaria Mendoza MD;  Location: [facility] (2ND FLR);  Service: Endoscopy;  Laterality: N/A;  pulmonary htn    HYSTERECTOMY  1990    FRANDY (AUB, Fibroids), ovaries remain     Current Outpatient Medications on File Prior to Visit   Medication Sig Dispense Refill    albuterol (VENTOLIN HFA) 90 mcg/actuation inhaler Inhale 2 puffs into the lungs every 4 (four) hours as needed for Wheezing. Rescue 18 g 1    carboxymethylcellulose sodium (REFRESH TEARS) 0.5 % Drop Apply 1-2 drops to every as needed      ergocalciferol (ERGOCALCIFEROL) 50,000 unit Cap Take 1 capsule (50,000 Units total) by mouth every 7 days. 12 capsule 1    multivitamin capsule Take 1 capsule by mouth once daily.       mycophenolate mofetil (CELLCEPT) 200 mg/mL SusR Take 2.5 mLs (500 mg total) by mouth 2 (two) times daily. 480 mL 1    NIFEdipine (ADALAT CC) 90 MG TbSR TAKE 1 TABLET(90 MG) BY MOUTH EVERY DAY 90 tablet 3    omeprazole (PRILOSEC) 40 MG capsule       pravastatin (PRAVACHOL) 20 MG tablet TAKE 1 TABLET BY MOUTH EVERY EVENING 90 tablet 0    spironolactone (ALDACTONE) 25 MG tablet TAKE 1 TABLET(25 MG) BY MOUTH EVERY DAY 30 tablet 11    tadalafil (ADCIRCA) 20 mg Tab Take 2 tablets (40 mg total) by mouth once daily. 60 tablet 11    conjugated estrogens (PREMARIN) vaginal cream Place 1 g vaginally twice a week. Place pea sized amount to vagina twice per day for two weeks then twice per week therafter 30 g 12     No current facility-administered medications on file prior to visit.      Review of Systems   Constitutional: Negative for  chills and fever.   HENT: Positive for tinnitus. Negative for ear discharge and ear pain.    Eyes: Negative for pain and discharge.   Respiratory: Negative for cough.    Cardiovascular: Negative for chest pain.   Gastrointestinal: Negative for nausea and vomiting.   Neurological: Negative for dizziness.   Psychiatric/Behavioral: Negative for depression.       Objective  Physical Exam   Constitutional: She is oriented to person, place, and time and well-developed, well-nourished, and in no distress. No distress.   HENT:   Head: Normocephalic and atraumatic.   Right Ear: External ear normal. Tympanic membrane is not scarred, not erythematous and not retracted.   Left Ear: External ear normal. Tympanic membrane is not scarred, not erythematous and not retracted.   Eyes: Pupils are equal, round, and reactive to light. Conjunctivae are normal.   Cardiovascular: Normal rate.   Pulmonary/Chest: Effort normal.   Musculoskeletal: Normal range of motion.   Neurological: She is alert and oriented to person, place, and time.   Skin: Skin is warm.   Psychiatric: Affect normal.             Assessment:   Yamel was seen today for tinnitus and hearing loss.    Diagnoses and all orders for this visit:    Sensorineural hearing loss (SNHL) of both ears    Tinnitus, unspecified laterality  -     Ambulatory referral/consult to ENT          Plan:  - discussed talking with her doctors regarding switching aldactone to help with tinnitus  - RTC prn

## 2020-05-21 ENCOUNTER — PATIENT MESSAGE (OUTPATIENT)
Dept: TRANSPLANT | Facility: CLINIC | Age: 70
End: 2020-05-21

## 2020-05-21 ENCOUNTER — PATIENT MESSAGE (OUTPATIENT)
Dept: PAIN MEDICINE | Facility: CLINIC | Age: 70
End: 2020-05-21

## 2020-05-22 ENCOUNTER — PATIENT MESSAGE (OUTPATIENT)
Dept: RHEUMATOLOGY | Facility: CLINIC | Age: 70
End: 2020-05-22

## 2020-05-22 RX ORDER — TADALAFIL 20 MG/1
40 TABLET ORAL DAILY
Qty: 28 TABLET | Refills: 11 | Status: SHIPPED | OUTPATIENT
Start: 2020-05-22 | End: 2021-12-30 | Stop reason: SDUPTHER

## 2020-05-27 ENCOUNTER — PES CALL (OUTPATIENT)
Dept: ADMINISTRATIVE | Facility: CLINIC | Age: 70
End: 2020-05-27

## 2020-05-29 ENCOUNTER — OFFICE VISIT (OUTPATIENT)
Dept: PULMONOLOGY | Facility: CLINIC | Age: 70
End: 2020-05-29
Payer: MEDICARE

## 2020-05-29 DIAGNOSIS — I27.29 PULMONARY HYPERTENSION ASSOCIATED WITH SYSTEMIC DISORDER: Chronic | ICD-10-CM

## 2020-05-29 DIAGNOSIS — J84.9 ILD (INTERSTITIAL LUNG DISEASE): ICD-10-CM

## 2020-05-29 PROCEDURE — 99213 OFFICE O/P EST LOW 20 MIN: CPT | Mod: 95,,, | Performed by: INTERNAL MEDICINE

## 2020-05-29 PROCEDURE — 99213 PR OFFICE/OUTPT VISIT, EST, LEVL III, 20-29 MIN: ICD-10-PCS | Mod: 95,,, | Performed by: INTERNAL MEDICINE

## 2020-05-31 PROCEDURE — 99454 REM MNTR PHYSIOL PARAM 16-30: CPT | Mod: PBBFAC | Performed by: INTERNAL MEDICINE

## 2020-06-01 NOTE — ASSESSMENT & PLAN NOTE
Due to SSc-ILD.  FVC and DLCO have previously been stable.  Will need repeat testing but based on symptoms, I do not suspect progression of her disease.  Continue with MMF.  She has significant esophageal dysfunction and GERD which I believe is contributing to her symptoms the most.  Continue with outpatient follow-up with Dr. Mendoza.  Given GI issues and previous stability in her PFTs, I do not think that addition of OFEV is warranted at the time.

## 2020-06-01 NOTE — PROGRESS NOTES
ADVANCED LUNG DISEASE CLINIC FOLLOW-UP    The patient location is: Home  The chief complaint leading to consultation is: ILD follow-up    Visit type: audio only    15 minutes of total time spent on the encounter, which includes face to face time and non-face to face time preparing to see the patient (eg, review of tests), Obtaining and/or reviewing separately obtained history, Documenting clinical information in the electronic or other health record, Independently interpreting results (not separately reported) and communicating results to the patient/family/caregiver, or Care coordination (not separately reported).     Each patient to whom he or she provides medical services by telemedicine is:  (1) informed of the relationship between the physician and patient and the respective role of any other health care provider with respect to management of the patient; and (2) notified that he or she may decline to receive medical services by telemedicine and may withdraw from such care at any time.      Subjective:       Patient ID: Yamel Shah is a 69 y.o. female.    Chief Complaint: No chief complaint on file.    Patient presents via audio call for ILD follow-up.  She states that since her last visit she has been doing well.  Self isolating due to the COVID pandemic.  She denies any changes in her dyspnea.  Has occasional cough.  Has significant GERD symptoms that are worse at night.  Follows with Dr. Lim for PAH.  Has a lot of questions today regarding COVID and safety of coming to the hospital or community.      Review of Systems   Constitutional: Negative for fever, chills, weight loss, weight gain, activity change, appetite change, fatigue, night sweats and weakness.   HENT: Negative for nosebleeds, postnasal drip, rhinorrhea, sinus pressure, sore throat, trouble swallowing, voice change, congestion, ear pain and hearing loss.    Eyes: Negative for redness.   Respiratory: Negative for snoring, cough,  sputum production, choking, chest tightness, shortness of breath, wheezing, orthopnea, dyspnea on extertion and Paroxysmal Nocturnal Dyspnea.    Cardiovascular: Negative for chest pain, palpitations and leg swelling.   Genitourinary: Negative for difficulty urinating.   Endocrine: Negative for polydipsia, cold intolerance, heat intolerance and polyuria.    Musculoskeletal: Negative for arthralgias, back pain, gait problem, joint swelling and myalgias.   Skin: Negative for rash.   Gastrointestinal: Positive for acid reflux. Negative for nausea, vomiting, abdominal pain and abdominal distention.   Neurological: Negative for dizziness, syncope, weakness, light-headedness and headaches.   Hematological: Negative for adenopathy. Does not bruise/bleed easily.   Psychiatric/Behavioral: Negative for confusion. The patient is not nervous/anxious.        Objective:      Physical Exam Deferred due to audio only visit.    Personal Diagnostic Review   No new PFTs available today.  Previous FVC and DLCO have remained stable.    6MW 10/23/2019 9/9/2019 9/4/2015 1/19/2015 2/14/2014 6/18/2013 1/8/2013   6MWT Status completed without stopping completed without stopping not completed completed without stopping completed without stopping - completed without stopping   Patient Reported Dyspnea;Leg pain Dyspnea - No complaints No complaints No complaints No complaints   Was O2 used? No No No No No No No   6MW Distance walked (feet) 1362 1100 - 1700 1500 1800 1740   Distance walked (meters) 415.14 335.28 - 518.16 457.2 548.64 530.35   Did patient stop? No No - No No No No   Oxygen Saturation 99 98 99 98 100 96 98   Supplemental Oxygen Room Air Room Air Room Air Room Air Room Air Room Air Room Air   Heart Rate 76 63 67 65 65 89 71   Blood Pressure 133/80 146/67 124/65 135/62 112/55 128/60 133/77   Ju Dyspnea Rating  light light moderate very, very light (just noticeable) moderate light moderate   Oxygen Saturation 99 98 - 100 100 96 96    Supplemental Oxygen Room Air Room Air - Room Air Room Air Room Air Room Air   Heart Rate 92 80 - 49 93 120 100   Blood Pressure 136/60 129/58 - 143/60 147/66 143/63 165/71   Ju Dyspnea Rating  moderate moderate - light moderate moderate very light   Recovery Time (seconds) 78 180 - 72 61 70 140   Oxygen Saturation 99 99 - 100 100 98 98   Supplemental Oxygen Room Air Room Air - Room Air Room Air Room Air Room Air   Heart Rate 71 67 - 71 88 99 78     Results for orders placed during the hospital encounter of 09/18/15   CT Chest Without Contrast    Narrative Technique:  The chest was surveyed from the lung apices through the costophrenic angles without the use of intravenous contrast material.  Data was reformatted for contiguous 5 mm images in the axial, sagittal, and coronal planes.  Data was post   processed for extraction of 1.25 mm images at 10 mm increments for effective high-resolution CT imaging without additional radiation to the patient.    Comparison: Chest radiograph 09/01/15; CT thorax 11/27/07.    Findings:    Examination of the vascular and soft tissue structures at the base of the neck is unremarkable.    The thoracic aorta maintains normal caliber, contour, and course without significant atherosclerotic calcification within its course.  The heart is not enlarged and there is no evidence of pericardial effusion. The esophagus is mildly dilated and   retained pneumatized secretions are identified within the dependent portion of the esophagus placing the patient at risk for aspiration and also suggestive esophageal dysmotility associated with scleroderma or reflux. There is no axillary, mediastinal,   or hilar lymphadenopathy.      A benign appearing but enlarged left axillary lymph node is identified, which appears stable to the prior examination dated 11/27/07.  Scattered normal sized and benign appearing mediastinal lymphadenopathy is also appreciated, also grossly similar to   the prior  examination.       The lungs again are symmetrically expanded and demonstrate chronic generalized lung disease with small opacities and reticulations in a peripheral predominance.  This again is most notable in the lung bases were there is dilatation of small airways and   faint groundglass opacity.  Findings appear grossly stable to the prior chest CT in 2007 and are consistent with interstitial lung disease as can be seen with scleroderma, pulmonary fibrosis or interstitial pneumonitis.  No focal mass lesion is   identified.    Limited views of the abdomen demonstrate nothing unusual on this noncontrast examination.    The osseous structures demonstrate mild degenerative changes and a stable appearing compression deformity of the T9 vertebral body.    Impression Stable-appearing chronic interstitial lung disease consistent with patient's history of scleroderma. No acute abnormality is identified.  Given the long-term stability of findings dating back to 2007, follow-up as clinically warranted.    Dependent secretions throughout the esophagus to suggest dysmotility.  ______________________________________     Electronically signed by resident: RANDY CAST MD  Date:     09/19/15  Time:    07:57          As the supervising and teaching physician, I personally reviewed the images and resident's interpretation and I agree with the findings.          Electronically signed by: ANALILIA MEDELLIN MD  Date:     09/19/15  Time:    08:57      TTE 3/2019  · Normal left ventricular systolic function. The estimated ejection fraction is 60%  · Grade II (moderate) left ventricular diastolic dysfunction consistent with pseudonormalization.  · Elevated left atrial pressure.  · No wall motion abnormalities.  · Normal right ventricular systolic function.  · Mild left atrial enlargement.  · Mild mitral regurgitation.  · The estimated PA systolic pressure is 41 mm Hg  Normal central venous pressure (3 mm Hg).   Assessment:       1.  ILD (interstitial lung disease)    2. Pulmonary hypertension associated with systemic disorder        Outpatient Encounter Medications as of 5/29/2020   Medication Sig Dispense Refill    albuterol (VENTOLIN HFA) 90 mcg/actuation inhaler Inhale 2 puffs into the lungs every 4 (four) hours as needed for Wheezing. Rescue 18 g 1    carboxymethylcellulose sodium (REFRESH TEARS) 0.5 % Drop Apply 1-2 drops to every as needed      ergocalciferol (ERGOCALCIFEROL) 50,000 unit Cap Take 1 capsule (50,000 Units total) by mouth every 7 days. 12 capsule 1    multivitamin capsule Take 1 capsule by mouth once daily.       mycophenolate mofetil (CELLCEPT) 200 mg/mL SusR Take 2.5 mLs (500 mg total) by mouth 2 (two) times daily. 480 mL 1    NIFEdipine (ADALAT CC) 90 MG TbSR TAKE 1 TABLET(90 MG) BY MOUTH EVERY DAY 90 tablet 3    omeprazole (PRILOSEC) 40 MG capsule       pravastatin (PRAVACHOL) 20 MG tablet TAKE 1 TABLET BY MOUTH EVERY EVENING 90 tablet 0    spironolactone (ALDACTONE) 25 MG tablet TAKE 1 TABLET(25 MG) BY MOUTH EVERY DAY 30 tablet 11    tadalafil (ADCIRCA) 20 mg Tab Take 2 tablets (40 mg total) by mouth once daily. 28 tablet 11     No facility-administered encounter medications on file as of 5/29/2020.      No orders of the defined types were placed in this encounter.      Plan:       Problem List Items Addressed This Visit        Pulmonary    Pulmonary hypertension associated with systemic disorder (Chronic)     Continue follow-up with Dr. Lim.  Currently on Adcirca.         ILD (interstitial lung disease)     Due to SSc-ILD.  FVC and DLCO have previously been stable.  Will need repeat testing but based on symptoms, I do not suspect progression of her disease.  Continue with MMF.  She has significant esophageal dysfunction and GERD which I believe is contributing to her symptoms the most.  Continue with outpatient follow-up with Dr. Mendoza.  Given GI issues and previous stability in her PFTs, I do not think  that addition of OFEV is warranted at the time.             Follow-up in 6 months.  Will need PFTs this fall.      Roberta Leigh D.O.  Pulmonary/Critical Care Medicine

## 2020-06-02 NOTE — PROGRESS NOTES
"Digital Medicine: Health  Follow-Up    Patient reports she is well, no complaints. Denies symptoms of hypertension- severe HA, chest pains and SOB. Reports she had a headache this weekend, but believes it was related to neck arthritis.    Discussed COVID-19 and importance of proper hand hygiene and social distancing.       The history is provided by the patient.     Follow Up  Follow-up reason(s): reading review and routine education      Readings are trending up       INTERVENTION(S)  recommended diet modifications, recommend physical activity, encouragement/support, denied further coaching, denied resources and denied questions    PLAN  patient verbalizes understanding, demonstrates understanding via teach back, patient amenable to changes and continue monitoring      There are no preventive care reminders to display for this patient.    Last 5 Patient Entered Readings                                      Current 30 Day Average: 134/66     Recent Readings 6/1/2020 5/31/2020 5/29/2020 5/23/2020 5/22/2020    SBP (mmHg) 140 132 142 144 134    DBP (mmHg) 68 67 71 63 63    Pulse 80 78 97 80 96                      Diet Screening   Breakfast is typically between. Eggs, fruits.  Patient reports eating or drinking the following: fruit, water, fresh vegetables and avocados, baked/broiled meats, "occasional" fried chickenShe has the following dietary restrictions: low sodium diet and DASH    Reports she has recently changed her diet due to doctor's recommendation, following DASH/mediteranean diet. Using herbs, pepper, and "a little" salt to season meals. Using "a little Zatarans" to season meals.     Reports drinking water, tea, almond milk and soda "every once in a while". Reports she has not been drinking coffee lately.     Physical Activity Screening   When asked if exercising, patient responded: yes    Reports she has been doing "limited" activity, walking up and down her stairs "sometimes",, but not walking " outside due to COVID-19 concerns. Advised patient to increase activity as tolerated, discussed walking around her house and up/ down her stairs.     Medication Adherence Screening   She did not miss a dose this month.      SDOH

## 2020-06-03 ENCOUNTER — PES CALL (OUTPATIENT)
Dept: ADMINISTRATIVE | Facility: CLINIC | Age: 70
End: 2020-06-03

## 2020-06-05 ENCOUNTER — OFFICE VISIT (OUTPATIENT)
Dept: PAIN MEDICINE | Facility: CLINIC | Age: 70
End: 2020-06-05
Payer: MEDICARE

## 2020-06-05 DIAGNOSIS — G60.9 IDIOPATHIC NEUROPATHY: ICD-10-CM

## 2020-06-05 DIAGNOSIS — G89.4 CHRONIC PAIN DISORDER: Primary | ICD-10-CM

## 2020-06-05 DIAGNOSIS — M34.89 CUTANEOUS SCLERODERMA: ICD-10-CM

## 2020-06-05 PROCEDURE — 99499 UNLISTED E&M SERVICE: CPT | Mod: 95,,, | Performed by: NURSE PRACTITIONER

## 2020-06-05 PROCEDURE — 99441 PR PHYSICIAN TELEPHONE EVALUATION 5-10 MIN: CPT | Mod: 95,,, | Performed by: NURSE PRACTITIONER

## 2020-06-05 PROCEDURE — 99441 PR PHYSICIAN TELEPHONE EVALUATION 5-10 MIN: ICD-10-PCS | Mod: 95,,, | Performed by: NURSE PRACTITIONER

## 2020-06-05 PROCEDURE — 99499 NO LOS: ICD-10-PCS | Mod: 95,,, | Performed by: NURSE PRACTITIONER

## 2020-06-05 NOTE — PROGRESS NOTES
Chronic Pain - Follow Up  Chronic Pain-Tele-Medicine-Established Note (Follow up visit)        The patient location is: Home  The chief complaint leading to consultation is: pain  Visit type: Virtual visit with synchronous audio only. The reason for the audio only service rather than synchronous audio and video virtual visit was related to technical difficulties or patient preference/necessity.  Total time spent with patient: 5 min  Each patient to whom he or she provides medical services by telemedicine is:  (1) informed of the relationship between the physician and patient and the respective role of any other health care provider with respect to management of the patient; and (2) notified that he or she may decline to receive medical services by telemedicine and may withdraw from such care at any time.    Referring Physician: No ref. provider found    Chief Complaint:   Chief Complaint   Patient presents with    Leg Pain        SUBJECTIVE: Disclaimer: This note has been generated using voice-recognition software. There may be typographical errors that have been missed during proof-reading    Interval History 6/5/2020:  The patient requests audio visit today for follow up of bilateral foot pain. She reports intermittent foot pain that is tolerable at this time. She has discontinued Lyrica as of April. She continues to have ulcers to her feet. She does have wound care. She denies any other health changes.     Interval History 1/17/2020:  The patient returns to clinic today for follow up. She continues to report bilateral foot pain. She describes this pain as burning and tingling in nature. At last visit, we decreased her Lyrica dose to 100 mg at night. She reports increased pain since then. She would like to go to back to the 150 mg dose. She continues to have ulcers to her feet, left worse than right. She continues to participate in a home wound care program. She denies any other health changes. Her pain today  is 6/10.    Interval History 12/17/2019:  The patient returns to clinic today for follow up. She reports improving foot pain. She reports improved healing ulcers to her left foot. She continues to report right foot pain that is burning and tingling in nature. She continues to participate in a home wound care routine. She continues to take Lyrica. She asks about decreasing this. She denies any other health changes. Her pain today is 5/10.    Interval History 9/17/2019:  The patient returns to clinic today for follow up. She continues to report bilateral foot pain that is burning and tingling in nature. Her pain is worse with sitting and at night. She continues to have slow healing ulcers to both feet. She continues to perform a home wound care program. She is no longer taking Gabapentin which was previously called in. She is now taking Pregabalin generic with benefit. She denies any other health changes. Her pain today is 4/10.    Interval History 6/13/2019:  The patient returns to clinic for follow up for bilateral foot pain. She continues to report bilateral foot pain that is burning in nature. She continues to have slow healing wounds to both feet from ulcers. She continues to take Lyrica once daily but reports increased pain. She continues to take Vitamin B12. She denies any other health changes. Her pain today is 8/10.    Interval History 3/13/2019:  The patient returns to clinic today for follow up. She continues to report bilateral foot pain that is burning and tingling in nature. Her pain is worse with prolonged walking and standing. She continues to have bilateral foot wounds. She reports that these wounds have significantly improved since last visit. She is currently taking Vitamin B12 with benefit. She continues to report benefit with Lyrica. She is currently taking this once daily. She denies any other health changes. Her pain today is 5/10.    Interval History 2/6/2019:  The patient returns to clinic  today for follow up. She reports that her bilateral foot wounds have significantly improved since last visit. She does report some significant improvement in her foot pain. She reports intermittent bilateral foot pain especially with prolonged walking. She describes this pain as heavy and tingling in nature. She is no longer taking Celebrex. She is currently taking Lyrica 150 mg BID with benefit. She does report that the Lyrica is expensive for her. She denies any other health changes. Her pain today is 5/10.    Interval History 12/5/18:  Patient returns to clinic today for follow up. She reports that since increasing her medications, she is having significant improvement in pain during the day time. She is currently taking Lyrica 75mg TID and Celebrex 200mg TID. However, she states that the burning  And tingling pain in both her feet increase in the evening around 6 or 7pm. She is unable to sleep during the nights due to the pain. She is still wearing her bilateral boots due to non healing ulcers from her scleroderma.     Interval History 8/30/2018:  The patient returns to clinic today for follow up. She continues to report bilateral foot pain that is burning and tingling in nature. She reports that this pain is worse at night. She is currently taking Lyrica 75 mg BID with limited relief. She did not have any benefit with Mobic. She continues to take Aleve with some benefit. She continues to have nonhealing ulcers. She wears an ACE bandage to her right calf, as well as boots to her bilateral feet. She denies any other health changes. Her pain today is 7/10.     Interval History 7/11/2018:  Since previous encounter she has weaned from gabapentin to 600mg / day and did not notice significant worsening of pain, but was having somnelence from higher doses of the medication in the past.  We are weaning in an attempt to trial Lyrica as an alternative to gabapentin.  Tramadol causes significant sedation, limiting its use.   She has discontinued the use of the tramadol.  Additionally she does have benefit from anti-inflammatory medication, has been using aleve, has not trialed CANTRELL-2 inhibitors in the past.    Interval history 05/16/2018:      The patient has had x-rays of the lumbar spine which did not reveal any evidence of significant neuroforaminal stenosis with degenerative disc disease.  There is mild facet arthropathy.  She has escalated her gabapentin and is currently taking 2100 mg of gabapentin per day without any noticeable improvement but daytime somnolence.  She continues to have nonhealing ulcers and topical pain cream is helping to a limited degree over her leg in areas where there is no skin disruption.    Initial encounter:    Yamel Shah presents to the clinic for the evaluation of foot pain. The pain started 2 years ago following ulcers and symptoms have been worsening.    Brief history: History of scleroderma    Pain Description:    The pain is located in the both feet in the area of slowly healing chronic foot ulcers which were partly from venous insufficiency and stasis associated with her scleroderma.  In her right lower extremity she describes radicular symptoms in the L4/5 distribution.    At BEST  5/10     At WORST  10/10 on the WORST day.      On average pain is rated as 8/10.     Today the pain is rated as 8/10    The pain is described as burning, sharp, shooting and constant      Symptoms interfere with daily activity, sleeping and work.     Exacerbating factors: Laying, Walking, Night Time, Morning and Getting out of bed/chair.      Mitigating factors medications.     Patient denies night fever/night sweats, urinary incontinence, bowel incontinence, significant weight loss, significant motor weakness and loss of sensations.  Patient denies any suicidal or homicidal ideations    Pain Medications:  Current:  Lyrica 150 mg daily    Tried in Past:  NSAIDs -Aleve, Mobic, Celebrex  TCA -Never  SNRI  -Never  Anti-convulsants - Gabapentin, Lyrica  Muscle Relaxants -Never  Opioids-Tramadol    Physical Therapy/Home Exercise: no       report:  Reviewed and consistent with medication use as prescribed.    Pain Procedures: none    Chiropractor -never  Acupuncture - never  TENS unit -never  Spinal decompression -never  Joint replacement -never    Imaging:   X-ray lumbar spine 6/1/2016:  2 views: Alignment is normal. There is mild DJD. No fracture dislocation bone destruction seen.   Impression      Mild DJD.     Xray lumbar spine 4/2018:  COMPARISON:  June 2016    FINDINGS:  There is slight curvature of the lumbar spine.  The vertebral bodies are normally aligned and normal in height.  Mild disc space narrowing present at L5-S1.  There is mild facet degenerative change in the lower spine.  No significant osteophytic spurring present.  There is no change in alignment with flexion or extension.      Past Medical History:   Diagnosis Date    Abnormal Pap smear     Acid reflux     Allergy     Arthritis     GERD (gastroesophageal reflux disease)     History of migraine headaches     Hypertension     Idiopathic neuropathy 7/20/2012    ILD (interstitial lung disease) 11/6/2013    Iron deficiency anemia 3/18/2014    MRSA carrier     Osteopenia     Pulmonary fibrosis     Pulmonary hypertension     Raynaud's disease     Scleroderma, diffuse     Sjogren's syndrome     Vitamin D deficiency 11/14/2013     Past Surgical History:   Procedure Laterality Date    24 HOUR IMPEDANCE PH MONITORING OF ESOPHAGUS IN PATIENT NOT TAKING ACID REDUCING MEDICATIONS N/A 3/4/2020    Procedure: IMPEDANCE PH STUDY, ESOPHAGEAL, 24 HOUR, IN PATIENT NOT TAKING ACID REDUCING MEDICATION;  Surgeon: Annamaria Mendoza MD;  Location: [facility] ([address] [place] [address]);  Service: Endoscopy;  Laterality: N/A;  OFF PPI/H2 Blocker   Motility Studies   Hold Narcotics x 1 days   Hold TCA x 1 days  2/26 - LVM attempting to confirm appt  2/27 - Confirmed appt     BREAST BIOPSY      Left, benign    CERVICAL CONIZATION   W/ LASER  1970    COLONOSCOPY      COLONOSCOPY N/A 3/29/2019    Procedure: COLONOSCOPY;  Surgeon: Annamaria Mendoza MD;  Location: [facility] (2ND FLR);  Service: Endoscopy;  Laterality: N/A;    DILATION AND CURETTAGE OF UTERUS      ESOPHAGEAL MANOMETRY WITH MEASUREMENT OF IMPEDANCE N/A 3/4/2020    Procedure: MANOMETRY, ESOPHAGUS, WITH IMPEDANCE MEASUREMENT;  Surgeon: Annamaria Mendoza MD;  Location: [facility] (4TH FLR);  Service: Endoscopy;  Laterality: N/A;  OFF PPI/H2 Blocker   Motility Studies   Hold Narcotics x 1 days   Hold TCA x 1 days    ESOPHAGOGASTRODUODENOSCOPY      ESOPHAGOGASTRODUODENOSCOPY N/A 3/29/2019    Procedure: EGD (ESOPHAGOGASTRODUODENOSCOPY);  Surgeon: Annamaria Mendoza MD;  Location: 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     Employer: RETIRED   Social Needs    Financial resource strain: Not very hard    Food insecurity:     Worry: Never true     Inability: Never true    Transportation needs:     Medical: No     Non-medical: No   Tobacco Use    Smoking status: Never Smoker    Smokeless tobacco: Never Used   Substance and Sexual Activity    Alcohol use: Yes     Alcohol/week: 0.0 standard drinks     Frequency: Monthly or less     Drinks per session: 1 or 2     Binge frequency: Never     Comment: ocasionally    Drug use: No    Sexual activity: Yes     Partners: Male     Birth control/protection: None   Lifestyle    Physical activity:     Days per week: 0 days     Minutes per session: 0 min    Stress: Only a little    Relationships    Social connections:     Talks on phone: More than three times a week     Gets together: Patient refused     Attends Confucianist service: More than 4 times per year     Active member of club or organization: No     Attends meetings of clubs or organizations: Never     Relationship status:    Other Topics Concern    Are you pregnant or think you may be? No    Breast-feeding No   Social History Narrative         Family History   Problem Relation Age of Onset    Breast cancer Mother     No Known Problems Daughter     No Known Problems Son     No Known Problems Daughter     No Known Problems Son     Breast cancer Sister     Breast cancer Maternal Aunt     Osteoarthritis Brother     Melanoma Neg Hx     Colon cancer Neg Hx     Crohn's disease Neg Hx     Stomach cancer Neg Hx     Ulcerative colitis Neg Hx     Rectal cancer Neg Hx     Irritable bowel syndrome Neg Hx     Esophageal cancer Neg Hx     Celiac disease Neg Hx     Ovarian cancer Neg Hx        Review of patient's allergies indicates:   Allergen Reactions    Sulfa (sulfonamide antibiotics)      Other reaction(s): Rash       Current Outpatient Medications   Medication Sig    albuterol (VENTOLIN HFA) 90 mcg/actuation inhaler Inhale 2 puffs into the lungs every 4 (four) hours as needed for Wheezing. Rescue    carboxymethylcellulose sodium (REFRESH TEARS) 0.5 % Drop Apply 1-2 drops to every as needed    conjugated estrogens (PREMARIN) vaginal cream Place 1 g vaginally twice a week. Place pea sized amount to vagina twice per day for two weeks then twice per week therafter    ergocalciferol (ERGOCALCIFEROL) 50,000 unit Cap Take 1 capsule (50,000 Units total) by mouth every 7 days.    multivitamin capsule Take 1 capsule by mouth once daily.     mycophenolate mofetil (CELLCEPT) 200 mg/mL SusR Take 2.5 mLs (500 mg total) by mouth 2 (two) times daily.    NIFEdipine (ADALAT CC) 90 MG TbSR TAKE 1 TABLET(90 MG) BY MOUTH  EVERY DAY    omeprazole (PRILOSEC) 40 MG capsule     pravastatin (PRAVACHOL) 20 MG tablet TAKE 1 TABLET BY MOUTH EVERY EVENING    spironolactone (ALDACTONE) 25 MG tablet TAKE 1 TABLET(25 MG) BY MOUTH EVERY DAY    tadalafil (ADCIRCA) 20 mg Tab Take 2 tablets (40 mg total) by mouth once daily.     No current facility-administered medications for this visit.        REVIEW OF SYSTEMS:    GENERAL:  No weight loss, malaise or fevers.  HEENT:   No recent changes in vision or hearing  NECK:  Negative for lumps,  difficulty with swallowing associated with scleroderma.  RESPIRATORY:  Negative for cough, wheezing or shortness of breath, patient denies any recent URI. Albuterol (history of ILD)  CARDIOVASCULAR:  Negative for chest pain, leg swelling or palpitations.  GI:  Negative for abdominal discomfort, blood in stools or black stools or change in bowel habits. GERD controlled zantac  MUSCULOSKELETAL:  See HPI.  SKIN:   Chronic low healing venous stasis ulcerations to bilateral lower extremities   PSYCH:  No mood disorder or recent psychosocial stressors.  Patients sleep is not disturbed secondary to pain.  HEMATOLOGY/LYMPHOLOGY:   History of iron deficiency anemia, takes daily iron supplementation.    ENDO: No history of diabetes or thyroid dysfunction  NEURO:   No history of headaches, syncope, paralysis, seizures or tremors.  All other reviewed and negative other than HPI.    OBJECTIVE:    PHYSICAL EXAMINATION:  Voice normal.  Normal affect without evidence of distress.      Previous physical exam:  There were no vitals taken for this visit.    PHYSICAL EXAMINATION:    GENERAL: Well appearing, in no acute distress, alert and oriented x3.  PSYCH:  Mood and affect appropriate.  SKIN: Tight skin associated with scleroderma.   HEAD/FACE:  Normocephalic, atraumatic. Cranial nerves grossly intact.  CV: RRR with palpation of the radial artery.  PULM: No evidence of respiratory difficulty, symmetric chest rise.  BACK:  There  is no pain with palpation over the facet joints of the lumbar spine bilaterally. Limited ROM with mild pain on extension. Positive facet loading bilaterally.    EXTREMITIES: Contractures over on bilateral hands with ulcerations to knuckles, right greater than left. Dressings noted to bilateral feet.   MUSCULOSKELETAL:  There is no pain to palpation over the greater trochanteric bursa bilaterally.  FABERs test is positive bilaterally.  FADIRs test is negative.   Bilateral lower extremity strength testing limited secondary to pain in the feet.    NEURO: Bilateral lower extremity coordination and muscle stretch reflexes are physiologic and symmetric. Decreased sensation to BLE. Plantar response are downgoing. No clonus.  No loss of sensation is noted.  GAIT: Antalgic, ambulates without assistance         Lab Results   Component Value Date    WBC 4.74 05/04/2020    HGB 12.8 05/04/2020    HCT 41.6 05/04/2020    MCV 92 05/04/2020     05/04/2020     BMP  Lab Results   Component Value Date     05/04/2020    K 3.7 05/04/2020     05/04/2020    CO2 24 05/04/2020    BUN 17 05/04/2020    CREATININE 0.8 05/04/2020    CALCIUM 9.1 05/04/2020    ANIONGAP 8 05/04/2020    ESTGFRAFRICA >60.0 05/04/2020    EGFRNONAA >60.0 05/04/2020         ASSESSMENT: 69 y.o. year old female with pain, consistent with     Encounter Diagnoses   Name Primary?    Chronic pain disorder Yes    Cutaneous scleroderma     Idiopathic neuropathy        PLAN:     - Previous imaging was reviewed and discussed with the patient today.    - She is no longer taking Lyrica. We discussed that we can restart this at a lower dose should her pain worsen.     - Continue to follow up with wound care.     - Continues home exercise routine.     - RTC PRN.     The above plan and management options were discussed at length with patient. Patient is in agreement with the above and verbalized understanding.     Viktoriya Camara NP  06/05/2020

## 2020-06-16 ENCOUNTER — TELEPHONE (OUTPATIENT)
Dept: PHARMACY | Facility: CLINIC | Age: 70
End: 2020-06-16

## 2020-06-19 ENCOUNTER — PES CALL (OUTPATIENT)
Dept: ADMINISTRATIVE | Facility: CLINIC | Age: 70
End: 2020-06-19

## 2020-06-25 ENCOUNTER — PES CALL (OUTPATIENT)
Dept: ADMINISTRATIVE | Facility: CLINIC | Age: 70
End: 2020-06-25

## 2020-07-01 NOTE — TELEPHONE ENCOUNTER
Refill call regarding [facility]. Patient has 1 dose left, Will ship out on  for patient to receive . Patient states that she has missed a couple of doses in the last month and was transferred to [facility]. Copay is $15.00 CCO[facility] 8899.Patient has not started any new medications including OTC drugs. Patient has not had any medication/ dose or instruction changes. No new allergies or side effects reported with this shipment. Medication is being taken as prescribed by physician and properly stored. Two patient identifiers:  and Address verified. Patient has no questions or concerns for [facility].ARS

## 2020-07-02 ENCOUNTER — OFFICE VISIT (OUTPATIENT)
Dept: INTERNAL MEDICINE | Facility: CLINIC | Age: 70
End: 2020-07-02
Payer: MEDICARE

## 2020-07-02 VITALS
OXYGEN SATURATION: 95 % | DIASTOLIC BLOOD PRESSURE: 62 MMHG | HEIGHT: 65 IN | TEMPERATURE: 99 F | BODY MASS INDEX: 26.08 KG/M2 | SYSTOLIC BLOOD PRESSURE: 114 MMHG | WEIGHT: 156.5 LBS | HEART RATE: 86 BPM | RESPIRATION RATE: 18 BRPM

## 2020-07-02 DIAGNOSIS — I10 ESSENTIAL HYPERTENSION: ICD-10-CM

## 2020-07-02 DIAGNOSIS — M34.9 SCLERODERMA: Primary | ICD-10-CM

## 2020-07-02 DIAGNOSIS — I27.29 PULMONARY HYPERTENSION ASSOCIATED WITH SYSTEMIC DISORDER: ICD-10-CM

## 2020-07-02 DIAGNOSIS — K21.9 GASTROESOPHAGEAL REFLUX DISEASE, ESOPHAGITIS PRESENCE NOT SPECIFIED: ICD-10-CM

## 2020-07-02 DIAGNOSIS — G47.9 SLEEP DISTURBANCE: ICD-10-CM

## 2020-07-02 DIAGNOSIS — D50.9 IRON DEFICIENCY ANEMIA, UNSPECIFIED IRON DEFICIENCY ANEMIA TYPE: ICD-10-CM

## 2020-07-02 DIAGNOSIS — G89.29 OTHER CHRONIC PAIN: ICD-10-CM

## 2020-07-02 PROCEDURE — 99214 PR OFFICE/OUTPT VISIT, EST, LEVL IV, 30-39 MIN: ICD-10-PCS | Mod: S$PBB,,, | Performed by: INTERNAL MEDICINE

## 2020-07-02 PROCEDURE — 99999 PR PBB SHADOW E&M-EST. PATIENT-LVL IV: ICD-10-PCS | Mod: PBBFAC,,, | Performed by: INTERNAL MEDICINE

## 2020-07-02 PROCEDURE — 99999 PR PBB SHADOW E&M-EST. PATIENT-LVL IV: CPT | Mod: PBBFAC,,, | Performed by: INTERNAL MEDICINE

## 2020-07-02 PROCEDURE — 99214 OFFICE O/P EST MOD 30 MIN: CPT | Mod: S$PBB,,, | Performed by: INTERNAL MEDICINE

## 2020-07-02 PROCEDURE — 99214 OFFICE O/P EST MOD 30 MIN: CPT | Mod: PBBFAC,PO | Performed by: INTERNAL MEDICINE

## 2020-07-02 RX ORDER — TRAZODONE HYDROCHLORIDE 50 MG/1
50 TABLET ORAL NIGHTLY
Qty: 30 TABLET | Refills: 4 | Status: SHIPPED | OUTPATIENT
Start: 2020-07-02 | End: 2020-12-15

## 2020-07-02 RX ORDER — SODIUM FLUORIDE/POTASSIUM NIT 1.1 %-5 %
PASTE (ML) DENTAL
COMMUNITY
Start: 2020-06-17

## 2020-07-13 NOTE — PROGRESS NOTES
Subjective:       Patient ID: Yamel Shah is a 69 y.o. female.    Chief Complaint: Follow-up (4 mo)    HPI   The patient presents for follow-up of medical conditions which include hypertension, scleroderma, pulmonary hypertension, hyperlipidemia, chronic right foot ulcer, chronic neck pain, and sleep disturbance.    The patient has been enrolled in the home digital Medicine program for hypertension.  Blood pressures have been elevated to a maximum of 140 systolic.  Recent blood pressure readings have ranged from 120-130 systolic.    The patient has been confined to home due to the COVID-19 pandemic in view of her underlying severe chronic illnesses.  She has not experienced any recent respiratory infections.    Chronic right foot pain due to foot ulcers is still noted.  The patient has been performing her own wound care at home.  She has 2 right foot ulcers which have been slowly healing.    Chronic neck pain is unchanged.  She has had associated headaches.    Review of Systems   Constitutional: Negative for activity change, appetite change, fatigue and unexpected weight change.   HENT: Positive for hearing loss and trouble swallowing.    Eyes: Negative for visual disturbance.   Respiratory: Negative for cough and shortness of breath.    Cardiovascular: Negative for chest pain, palpitations and leg swelling.   Gastrointestinal: Positive for reflux. Negative for abdominal pain, blood in stool, constipation and diarrhea.   Genitourinary: Negative for dysuria and hematuria.   Musculoskeletal: Positive for arthralgias and neck pain. Negative for neck stiffness.   Integumentary:  Positive for wound. Negative for rash.   Neurological: Positive for headaches. Negative for dizziness and syncope.   Psychiatric/Behavioral: Positive for sleep disturbance.         Objective:      Physical Exam  Vitals signs and nursing note reviewed.   Constitutional:       General: She is not in acute distress.     Appearance: She is  well-developed.   HENT:      Head: Normocephalic and atraumatic.   Eyes:      General: No scleral icterus.     Conjunctiva/sclera: Conjunctivae normal.   Neck:      Musculoskeletal: Normal range of motion and neck supple.      Thyroid: No thyromegaly.      Vascular: No JVD.   Cardiovascular:      Rate and Rhythm: Normal rate and regular rhythm.      Heart sounds: Normal heart sounds. No murmur. No friction rub. No gallop.    Pulmonary:      Effort: Pulmonary effort is normal. No respiratory distress.      Breath sounds: No wheezing.      Comments: Crackles are noted at both lung bases.  Abdominal:      General: Bowel sounds are normal.      Palpations: Abdomen is soft. There is no mass.      Tenderness: There is no abdominal tenderness.   Musculoskeletal: Normal range of motion.         General: No tenderness.   Lymphadenopathy:      Cervical: No cervical adenopathy.   Skin:     General: Skin is warm and dry.      Findings: No rash.      Comments: Superficial ulcers are noted on the lateral aspect of the right foot and on the dorsal aspect of the right foot.   Neurological:      Mental Status: She is alert and oriented to person, place, and time.         Assessment:       1. Scleroderma    2. Pulmonary hypertension associated with systemic disorder    3. Essential hypertension    4. Gastroesophageal reflux disease, esophagitis presence not specified    5. Iron deficiency anemia, unspecified iron deficiency anemia type    6. Other chronic pain    7. Sleep disturbance        Plan:     Yamel was seen today for follow-up.  Trazodone will be ordered for sleep.  The patient has been encouraged to increase her exercise level and to lose weight.  She may use Tylenol Arthritis for pain.  She is also scheduled to follow-up with Pain Management regarding chronic pain symptoms.  The patient will continue to follow-up with her rheumatologist as scheduled.    Diagnoses and all orders for this  visit:    Scleroderma    Pulmonary hypertension associated with systemic disorder    Essential hypertension    Gastroesophageal reflux disease, esophagitis presence not specified    Iron deficiency anemia, unspecified iron deficiency anemia type    Other chronic pain    Sleep disturbance    Other orders  -     traZODone (DESYREL) 50 MG tablet; Take 1 tablet (50 mg total) by mouth every evening.

## 2020-07-14 ENCOUNTER — CLINICAL SUPPORT (OUTPATIENT)
Dept: REHABILITATION | Facility: HOSPITAL | Age: 70
End: 2020-07-14
Payer: MEDICARE

## 2020-07-14 ENCOUNTER — PATIENT OUTREACH (OUTPATIENT)
Dept: OTHER | Facility: OTHER | Age: 70
End: 2020-07-14

## 2020-07-14 DIAGNOSIS — M34.9 SCLERODERMA: ICD-10-CM

## 2020-07-14 DIAGNOSIS — M25.60 STIFFNESS IN JOINT: ICD-10-CM

## 2020-07-14 PROCEDURE — L3913 HFO W/O JOINTS CF: HCPCS | Mod: PO

## 2020-07-14 NOTE — PROGRESS NOTES
"OCCUPATIONAL THERAPY --- ORTHOTIC EVALUATION - FITTING - TRAINING     Patient: Yamel Shah  Date of Evaluation: 7/14/2020  MRN: [id number]  Diagnosis:   Encounter Diagnoses   Name Primary?    Scleroderma     Stiffness in joint      Physician: Gretchen Madison MD    SUBJECTIVE:   " I have been having this since 1980    Pain: 0 /10    OBJECTIVE:     Observations: contracture of left hand     Range of Motion (in degrees): contracture     Strength (in pounds): Deferred       Circumferential Edema Measurements (in cm): Deferred       ADLs/Function:      ASSESSMENT:   Initial OT evaluation completed.  Fabricated custom orthotic resting mitt orthosis per MD orders.  Instructed in orthotic use, wear, care and precautions in detail w/ patient.    Supplied extra straps and padding.    Rehab Potential: good      PLAN:    Discharge with static resting pan orthosis for night use       ISREAL Zhou, OTR/L, CHT  Occupational therapist, Certified Hand Therapist    "

## 2020-07-15 DIAGNOSIS — Z71.89 COMPLEX CARE COORDINATION: ICD-10-CM

## 2020-07-23 ENCOUNTER — TELEPHONE (OUTPATIENT)
Dept: PHARMACY | Facility: CLINIC | Age: 70
End: 2020-07-23

## 2020-07-31 ENCOUNTER — TELEPHONE (OUTPATIENT)
Dept: PHARMACY | Facility: CLINIC | Age: 70
End: 2020-07-31

## 2020-07-31 RX ORDER — MYCOPHENOLATE MOFETIL 200 MG/ML
500 POWDER, FOR SUSPENSION ORAL 2 TIMES DAILY
Qty: 480 ML | Refills: 1 | Status: SHIPPED | OUTPATIENT
Start: 2020-07-31 | End: 2020-08-11 | Stop reason: SDUPTHER

## 2020-08-06 ENCOUNTER — LAB VISIT (OUTPATIENT)
Dept: LAB | Facility: HOSPITAL | Age: 70
End: 2020-08-06
Attending: INTERNAL MEDICINE
Payer: MEDICARE

## 2020-08-06 ENCOUNTER — OFFICE VISIT (OUTPATIENT)
Dept: INTERNAL MEDICINE | Facility: CLINIC | Age: 70
End: 2020-08-06
Payer: MEDICARE

## 2020-08-06 VITALS
HEIGHT: 65 IN | HEART RATE: 76 BPM | DIASTOLIC BLOOD PRESSURE: 60 MMHG | WEIGHT: 156.5 LBS | BODY MASS INDEX: 26.08 KG/M2 | SYSTOLIC BLOOD PRESSURE: 116 MMHG

## 2020-08-06 DIAGNOSIS — I27.29 PULMONARY HYPERTENSION ASSOCIATED WITH SYSTEMIC DISORDER: Chronic | ICD-10-CM

## 2020-08-06 DIAGNOSIS — L98.499 ARTERIAL INSUFFICIENCY WITH ISCHEMIC ULCER: ICD-10-CM

## 2020-08-06 DIAGNOSIS — M34.9 SCLERODERMA WITH PULMONARY INVOLVEMENT: ICD-10-CM

## 2020-08-06 DIAGNOSIS — G60.9 IDIOPATHIC NEUROPATHY: ICD-10-CM

## 2020-08-06 DIAGNOSIS — L97.519 SKIN ULCERS OF BOTH FEET: ICD-10-CM

## 2020-08-06 DIAGNOSIS — M34.89 CUTANEOUS SCLERODERMA: ICD-10-CM

## 2020-08-06 DIAGNOSIS — I10 ESSENTIAL HYPERTENSION: ICD-10-CM

## 2020-08-06 DIAGNOSIS — Z00.00 ENCOUNTER FOR PREVENTIVE HEALTH EXAMINATION: Primary | ICD-10-CM

## 2020-08-06 DIAGNOSIS — R32 URINARY INCONTINENCE, UNSPECIFIED TYPE: ICD-10-CM

## 2020-08-06 DIAGNOSIS — I87.2 VENOUS INSUFFICIENCY OF BOTH LOWER EXTREMITIES: ICD-10-CM

## 2020-08-06 DIAGNOSIS — D50.9 IRON DEFICIENCY ANEMIA, UNSPECIFIED IRON DEFICIENCY ANEMIA TYPE: ICD-10-CM

## 2020-08-06 DIAGNOSIS — G89.29 OTHER CHRONIC PAIN: Chronic | ICD-10-CM

## 2020-08-06 DIAGNOSIS — I77.1 ARTERIAL INSUFFICIENCY WITH ISCHEMIC ULCER: ICD-10-CM

## 2020-08-06 DIAGNOSIS — L97.529 SKIN ULCERS OF BOTH FEET: ICD-10-CM

## 2020-08-06 DIAGNOSIS — E55.9 VITAMIN D DEFICIENCY: ICD-10-CM

## 2020-08-06 DIAGNOSIS — K21.9 GASTROESOPHAGEAL REFLUX DISEASE, ESOPHAGITIS PRESENCE NOT SPECIFIED: ICD-10-CM

## 2020-08-06 DIAGNOSIS — M85.89 OSTEOPENIA OF MULTIPLE SITES: ICD-10-CM

## 2020-08-06 DIAGNOSIS — I73.00 RAYNAUD'S DISEASE WITHOUT GANGRENE: ICD-10-CM

## 2020-08-06 DIAGNOSIS — M24.549 CONTRACTURE OF HAND: ICD-10-CM

## 2020-08-06 DIAGNOSIS — J84.9 ILD (INTERSTITIAL LUNG DISEASE): ICD-10-CM

## 2020-08-06 DIAGNOSIS — M34.9 SCLERODERMA: ICD-10-CM

## 2020-08-06 LAB
ALBUMIN SERPL BCP-MCNC: 3.7 G/DL (ref 3.5–5.2)
ALP SERPL-CCNC: 115 U/L (ref 55–135)
ALT SERPL W/O P-5'-P-CCNC: 11 U/L (ref 10–44)
ANION GAP SERPL CALC-SCNC: 8 MMOL/L (ref 8–16)
AST SERPL-CCNC: 19 U/L (ref 10–40)
BASOPHILS # BLD AUTO: 0.03 K/UL (ref 0–0.2)
BASOPHILS # BLD AUTO: 0.03 K/UL (ref 0–0.2)
BASOPHILS NFR BLD: 0.6 % (ref 0–1.9)
BASOPHILS NFR BLD: 0.6 % (ref 0–1.9)
BILIRUB SERPL-MCNC: 0.3 MG/DL (ref 0.1–1)
BUN SERPL-MCNC: 15 MG/DL (ref 8–23)
CALCIUM SERPL-MCNC: 8.9 MG/DL (ref 8.7–10.5)
CHLORIDE SERPL-SCNC: 109 MMOL/L (ref 95–110)
CO2 SERPL-SCNC: 24 MMOL/L (ref 23–29)
CREAT SERPL-MCNC: 0.8 MG/DL (ref 0.5–1.4)
CRP SERPL-MCNC: 11.6 MG/L (ref 0–8.2)
CRP SERPL-MCNC: 11.6 MG/L (ref 0–8.2)
DIFFERENTIAL METHOD: ABNORMAL
DIFFERENTIAL METHOD: ABNORMAL
EOSINOPHIL # BLD AUTO: 0 K/UL (ref 0–0.5)
EOSINOPHIL # BLD AUTO: 0 K/UL (ref 0–0.5)
EOSINOPHIL NFR BLD: 0.8 % (ref 0–8)
EOSINOPHIL NFR BLD: 0.8 % (ref 0–8)
ERYTHROCYTE [DISTWIDTH] IN BLOOD BY AUTOMATED COUNT: 15.6 % (ref 11.5–14.5)
ERYTHROCYTE [DISTWIDTH] IN BLOOD BY AUTOMATED COUNT: 15.6 % (ref 11.5–14.5)
ERYTHROCYTE [SEDIMENTATION RATE] IN BLOOD BY WESTERGREN METHOD: 45 MM/HR (ref 0–36)
ERYTHROCYTE [SEDIMENTATION RATE] IN BLOOD BY WESTERGREN METHOD: 45 MM/HR (ref 0–36)
EST. GFR  (AFRICAN AMERICAN): >60 ML/MIN/1.73 M^2
EST. GFR  (NON AFRICAN AMERICAN): >60 ML/MIN/1.73 M^2
GLUCOSE SERPL-MCNC: 117 MG/DL (ref 70–110)
HCT VFR BLD AUTO: 40.1 % (ref 37–48.5)
HCT VFR BLD AUTO: 40.1 % (ref 37–48.5)
HGB BLD-MCNC: 12.2 G/DL (ref 12–16)
HGB BLD-MCNC: 12.2 G/DL (ref 12–16)
IMM GRANULOCYTES # BLD AUTO: 0.01 K/UL (ref 0–0.04)
IMM GRANULOCYTES # BLD AUTO: 0.01 K/UL (ref 0–0.04)
IMM GRANULOCYTES NFR BLD AUTO: 0.2 % (ref 0–0.5)
IMM GRANULOCYTES NFR BLD AUTO: 0.2 % (ref 0–0.5)
LYMPHOCYTES # BLD AUTO: 2 K/UL (ref 1–4.8)
LYMPHOCYTES # BLD AUTO: 2 K/UL (ref 1–4.8)
LYMPHOCYTES NFR BLD: 39.8 % (ref 18–48)
LYMPHOCYTES NFR BLD: 39.8 % (ref 18–48)
MCH RBC QN AUTO: 28.4 PG (ref 27–31)
MCH RBC QN AUTO: 28.4 PG (ref 27–31)
MCHC RBC AUTO-ENTMCNC: 30.4 G/DL (ref 32–36)
MCHC RBC AUTO-ENTMCNC: 30.4 G/DL (ref 32–36)
MCV RBC AUTO: 93 FL (ref 82–98)
MCV RBC AUTO: 93 FL (ref 82–98)
MONOCYTES # BLD AUTO: 0.4 K/UL (ref 0.3–1)
MONOCYTES # BLD AUTO: 0.4 K/UL (ref 0.3–1)
MONOCYTES NFR BLD: 8.2 % (ref 4–15)
MONOCYTES NFR BLD: 8.2 % (ref 4–15)
NEUTROPHILS # BLD AUTO: 2.6 K/UL (ref 1.8–7.7)
NEUTROPHILS # BLD AUTO: 2.6 K/UL (ref 1.8–7.7)
NEUTROPHILS NFR BLD: 50.4 % (ref 38–73)
NEUTROPHILS NFR BLD: 50.4 % (ref 38–73)
NRBC BLD-RTO: 0 /100 WBC
NRBC BLD-RTO: 0 /100 WBC
PLATELET # BLD AUTO: 183 K/UL (ref 150–350)
PLATELET # BLD AUTO: 183 K/UL (ref 150–350)
PMV BLD AUTO: 11.9 FL (ref 9.2–12.9)
PMV BLD AUTO: 11.9 FL (ref 9.2–12.9)
POTASSIUM SERPL-SCNC: 3.8 MMOL/L (ref 3.5–5.1)
PROT SERPL-MCNC: 8.4 G/DL (ref 6–8.4)
RBC # BLD AUTO: 4.3 M/UL (ref 4–5.4)
RBC # BLD AUTO: 4.3 M/UL (ref 4–5.4)
SODIUM SERPL-SCNC: 141 MMOL/L (ref 136–145)
WBC # BLD AUTO: 5.1 K/UL (ref 3.9–12.7)
WBC # BLD AUTO: 5.1 K/UL (ref 3.9–12.7)

## 2020-08-06 PROCEDURE — G0439 PR MEDICARE ANNUAL WELLNESS SUBSEQUENT VISIT: ICD-10-PCS | Mod: S$GLB,,, | Performed by: NURSE PRACTITIONER

## 2020-08-06 PROCEDURE — 86140 C-REACTIVE PROTEIN: CPT

## 2020-08-06 PROCEDURE — 99999 PR PBB SHADOW E&M-EST. PATIENT-LVL V: CPT | Mod: PBBFAC,,, | Performed by: NURSE PRACTITIONER

## 2020-08-06 PROCEDURE — G0439 PPPS, SUBSEQ VISIT: HCPCS | Mod: S$GLB,,, | Performed by: NURSE PRACTITIONER

## 2020-08-06 PROCEDURE — 80053 COMPREHEN METABOLIC PANEL: CPT

## 2020-08-06 PROCEDURE — 36415 COLL VENOUS BLD VENIPUNCTURE: CPT

## 2020-08-06 PROCEDURE — 99215 OFFICE O/P EST HI 40 MIN: CPT | Mod: PBBFAC | Performed by: NURSE PRACTITIONER

## 2020-08-06 PROCEDURE — 85025 COMPLETE CBC W/AUTO DIFF WBC: CPT

## 2020-08-06 PROCEDURE — 85652 RBC SED RATE AUTOMATED: CPT

## 2020-08-06 PROCEDURE — 99999 PR PBB SHADOW E&M-EST. PATIENT-LVL V: ICD-10-PCS | Mod: PBBFAC,,, | Performed by: NURSE PRACTITIONER

## 2020-08-06 NOTE — PROGRESS NOTES
I offered to discuss end of life issues, including information on how to make advance directives that the patient could use to name someone who would make medical decisions on their behalf if they became too ill to make themselves.    _X_Patient reports that she has advanced directives at home.  She just needs to fill them out and bring them in  ___Patient is interested, I provided paper work and offered to discuss.

## 2020-08-06 NOTE — PROGRESS NOTES
"  Yamel Shah presented for a  Medicare AWV and comprehensive Health Risk Assessment today. The following components were reviewed and updated:    · Medical history  · Family History  · Social history  · Allergies and Current Medications  · Health Risk Assessment  · Health Maintenance  · Care Team     ** See Completed Assessments for Annual Wellness Visit within the encounter summary.**         The following assessments were completed:  · Living Situation  · CAGE  · Depression Screening  · Timed Get Up and Go  · Whisper Test  · Cognitive Function Screening  · Nutrition Screening  · ADL Screening  · PAQ Screening        Vitals:    08/06/20 0941   BP: 116/60   BP Location: Left arm   Patient Position: Sitting   Pulse: 76   Weight: 71 kg (156 lb 8.4 oz)   Height: 5' 5" (1.651 m)     Body mass index is 26.05 kg/m².     Physical Exam  Constitutional:       General: She is not in acute distress.     Appearance: She is well-developed. She is not ill-appearing or diaphoretic.   HENT:      Head: Normocephalic and atraumatic.   Eyes:      General: No scleral icterus.     Conjunctiva/sclera: Conjunctivae normal.   Neck:      Musculoskeletal: Normal range of motion and neck supple.   Cardiovascular:      Rate and Rhythm: Normal rate and regular rhythm.      Pulses: Normal pulses.      Heart sounds: Normal heart sounds.   Pulmonary:      Effort: Pulmonary effort is normal. No respiratory distress.      Breath sounds: Wheezing and rales present.      Comments: Occasional rale and wheeze noted  Abdominal:      General: There is no distension.   Musculoskeletal: Normal range of motion.   Skin:     General: Skin is warm and dry.   Neurological:      Mental Status: She is alert and oriented to person, place, and time.   Psychiatric:         Behavior: Behavior normal.           Diagnoses and health risks identified today and associated recommendations/orders:    1. Encounter for preventive health examination  Assessment " completed  Preventive health recommendations reviewed    2. Cutaneous scleroderma  Stable.   Controlled with current medical therapy  Followed by rheumatology     3. Scleroderma with pulmonary involvement  Stable.   Controlled with current medical therapy  Followed by rheumatology and pulmonology      - Ambulatory referral/consult to Orthopedics; Future    4. ILD (interstitial lung disease)  Stable.   Controlled with current medical therapy  Followed by pulmonology.     5. Pulmonary hypertension associated with systemic disorder  Stable.   Controlled with current medical therapy  Followed by cardiology.     6. Raynaud's disease without gangrene  Stable.   Controlled with current medical therapy  Followed by PCP.     - Ambulatory referral/consult to Orthopedics; Future    7. Arterial insufficiency with ischemic ulcer  Stable.   Controlled with current medical therapy  Followed by PCP.  was at 1 time seen wound care.  Now dressing wound herself    8. Venous insufficiency of both lower extremities  Stable.   Controlled with current medical therapy  Followed by PCP.     9. Skin ulcers of both feet  Stable.   Controlled with current medical therapy  Followed by PCP.  was at 1 time seen wound care.  Now dressing wound herself     10. Contracture of hand  Patient has contractures of hands from scleroderma.  She was made a orthotic brace by occupational therapy, however does not feel that this brace fits well and does what it is desired to do and would like orders for another custom brace or for a referral to a place that can make her one    - Ambulatory referral/consult to Orthopedics; Future    11. Idiopathic neuropathy  Stable.   Controlled with current medical therapy  Followed by PCP and pain management.     12. Essential hypertension  Stable.   Controlled with current medical therapy  Followed by PCP.     13. Iron deficiency anemia, unspecified iron deficiency anemia type  Per patient this is improved  Followed by  PCP.     14. Vitamin D deficiency  Stable.   Controlled with current medical therapy  Followed by PCP.     15. Gastroesophageal reflux disease, esophagitis presence not specified  Stable.   Controlled with current medical therapy  Followed by PCP.     16. Urinary incontinence, unspecified type  Stable.   Followed by PCP.     17. Other chronic pain  Stable.   Controlled with current medical therapy  Followed by pain management.     18. Osteopenia of multiple sites  Stable.   Controlled with current medical therapy  Followed by PCP.     Provided Yamel with a 5-10 year written screening schedule and personal prevention plan. Recommendations were developed using the USPSTF age appropriate recommendations. Education, counseling, and referrals were provided as needed. After Visit Summary printed and given to patient which includes a list of additional screenings\tests needed.    Follow up in 3 months (on 11/6/2020) for a routine visit with your primary care provider or sooner if problems arise.    Katherine Galdamez NP

## 2020-08-06 NOTE — PATIENT INSTRUCTIONS
Referral placed for hand clinic.  Can schedule appointment at the check out desk      Counseling and Referral of Other Preventative  (Italic type indicates deductible and co-insurance are waived)    Patient Name: Yamel Shah  Today's Date: 8/6/2020    Health Maintenance       Date Due Completion Date    Influenza Vaccine (1) 09/01/2020 9/9/2019    DEXA SCAN 09/04/2021 9/4/2018    Pneumococcal Vaccine (65+ Low/Medium Risk) (2 of 2 - PPSV23) 09/27/2021 9/27/2016    Mammogram 11/22/2021 11/22/2019    Colorectal Cancer Screening 03/29/2022 3/29/2019    Lipid Panel 05/10/2024 5/10/2019    TETANUS VACCINE 02/06/2029 2/6/2019        No orders of the defined types were placed in this encounter.    The following information is provided to all patients.  This information is to help you find resources for any of the problems found today that may be affecting your health:                Living healthy guide: www.[facility].[place].[facility]      Understanding Diabetes: www.diabetes.org      Eating healthy: www.cdc.gov/healthyweight      CDC home safety checklist: www.cdc.gov/steadi/patient.html      Agency on Aging: www.goea.[place].[facility]      Alcoholics anonymous (AA): www.aa.org      Physical Activity: www.damon.nih.gov/mc4makr      Tobacco use: www.quitwithusla.org

## 2020-08-10 ENCOUNTER — HOSPITAL ENCOUNTER (OUTPATIENT)
Dept: RADIOLOGY | Facility: HOSPITAL | Age: 70
Discharge: HOME | End: 2020-08-10
Attending: ORTHOPAEDIC SURGERY
Payer: MEDICARE

## 2020-08-10 DIAGNOSIS — M79.642 BILATERAL HAND PAIN: Primary | ICD-10-CM

## 2020-08-10 DIAGNOSIS — M79.641 BILATERAL HAND PAIN: Primary | ICD-10-CM

## 2020-08-10 DIAGNOSIS — M79.642 LEFT HAND PAIN: Primary | ICD-10-CM

## 2020-08-10 DIAGNOSIS — M79.642 LEFT HAND PAIN: ICD-10-CM

## 2020-08-10 PROCEDURE — 73130 X-RAY EXAM OF HAND: CPT | Mod: TC,LT

## 2020-08-10 PROCEDURE — 73130 XR HAND COMPLETE 3 VIEW LEFT: ICD-10-PCS | Mod: 26,LT,, | Performed by: RADIOLOGY

## 2020-08-10 PROCEDURE — 73130 X-RAY EXAM OF HAND: CPT | Mod: 26,LT,, | Performed by: RADIOLOGY

## 2020-08-10 NOTE — PROGRESS NOTES
"Digital Medicine: Health  Follow-Up    Reports her reading are not transmitting to chart, placed CRM and gave her direct number to Tech Support. Patient reports she is well, no complaints. Denies symptoms of hypertension- HA, chest pains and SOB. Reports neck arthritis causes headaches "every now and then".     Discussed COVID-19 and importance of proper hand hygiene and social distancing.     Reports her reading today was 120/80, pulse is 80. Patient verbalized intent to continue checking BP, and send me her readings via [employer].     The history is provided by the patient.             Reason for review: Blood pressure at goal        Topics Covered on Call: physical activity and Diet          Diet-Change  24 hour dietary recall  Dinner is typically between. Baked chicken-sweet and sour, sweet potatoes, spinach.     Dietary Improvements:Reports she has been following low-sodium diet. Still working to limit sugar intake. Reports she feels she is eating healthier- more fruits and vegetables. Reports her doctor recommended the mediterranean diet to her in efforts to reduce inflammation.     Reports she has been drinking plenty of water lately.     Dietary Indiscretions:          Physical Activity-Change      Additional physical activity details: Walking up and down her stairs, going outside "sometimes".       Medication Adherence-Medication adherence was asssessed.  Patient continue taking medication as prescribed.          Substance, Sleep, Stress-change  stress-not assessed  Details:  Intervention(s):    Sleep-assessed  Details:sleeping 7-8 hours per night.   Intervention(s):    Alcohol -not assessed  Details:  Intervention(s):    Tobacco-Not Assessed  Details:  Intervention(s):          Continue current diet/physical activity routine.  Tech support needed.  Provided patient education.       Addressed any questions or concerns and patient has my contact information if needed prior to next outreach. Patient " verbalizes understanding.      Explained the importance of self-monitoring and medication adherence. Encouraged the patient to communicate with their health  for lifestyle modifications to help improve or maintain a healthy lifestyle.            There are no preventive care reminders to display for this patient.    Last 5 Patient Entered Readings                                      Current 30 Day Average: 123/62     Recent Readings 8/2/2020 7/31/2020 7/30/2020 7/28/2020 7/27/2020    SBP (mmHg) 121 116 128 118 126    DBP (mmHg) 66 65 60 61 62    Pulse 84 74 82 84 87

## 2020-08-11 ENCOUNTER — OFFICE VISIT (OUTPATIENT)
Dept: ORTHOPEDICS | Facility: CLINIC | Age: 70
End: 2020-08-11
Payer: MEDICARE

## 2020-08-11 ENCOUNTER — OFFICE VISIT (OUTPATIENT)
Dept: RHEUMATOLOGY | Facility: CLINIC | Age: 70
End: 2020-08-11
Payer: MEDICARE

## 2020-08-11 ENCOUNTER — TELEPHONE (OUTPATIENT)
Dept: PHARMACY | Facility: CLINIC | Age: 70
End: 2020-08-11

## 2020-08-11 VITALS
BODY MASS INDEX: 25.39 KG/M2 | WEIGHT: 158 LBS | SYSTOLIC BLOOD PRESSURE: 120 MMHG | DIASTOLIC BLOOD PRESSURE: 59 MMHG | HEIGHT: 66 IN | HEART RATE: 80 BPM

## 2020-08-11 VITALS — WEIGHT: 155.44 LBS | BODY MASS INDEX: 24.98 KG/M2 | HEIGHT: 66 IN

## 2020-08-11 DIAGNOSIS — I27.29 PULMONARY HYPERTENSION ASSOCIATED WITH SYSTEMIC DISORDER: ICD-10-CM

## 2020-08-11 DIAGNOSIS — M85.80 OSTEOPENIA, UNSPECIFIED LOCATION: ICD-10-CM

## 2020-08-11 DIAGNOSIS — J84.9 ILD (INTERSTITIAL LUNG DISEASE): ICD-10-CM

## 2020-08-11 DIAGNOSIS — M34.9 SCLERODERMA WITH PULMONARY INVOLVEMENT: ICD-10-CM

## 2020-08-11 DIAGNOSIS — I73.00 RAYNAUD'S DISEASE WITHOUT GANGRENE: ICD-10-CM

## 2020-08-11 DIAGNOSIS — K21.9 GASTROESOPHAGEAL REFLUX DISEASE, ESOPHAGITIS PRESENCE NOT SPECIFIED: ICD-10-CM

## 2020-08-11 DIAGNOSIS — R70.0 SEDIMENTATION RATE ELEVATION: ICD-10-CM

## 2020-08-11 DIAGNOSIS — M34.9 SCLERODERMA: Primary | ICD-10-CM

## 2020-08-11 DIAGNOSIS — L97.529 SKIN ULCERS OF BOTH FEET: ICD-10-CM

## 2020-08-11 DIAGNOSIS — I87.2 VENOUS INSUFFICIENCY OF BOTH LOWER EXTREMITIES: ICD-10-CM

## 2020-08-11 DIAGNOSIS — L97.519 SKIN ULCERS OF BOTH FEET: ICD-10-CM

## 2020-08-11 DIAGNOSIS — M24.549 CONTRACTURE OF HAND: ICD-10-CM

## 2020-08-11 DIAGNOSIS — Z78.0 ASYMPTOMATIC MENOPAUSAL STATE: ICD-10-CM

## 2020-08-11 PROCEDURE — 99999 PR PBB SHADOW E&M-EST. PATIENT-LVL IV: CPT | Mod: PBBFAC,,, | Performed by: ORTHOPAEDIC SURGERY

## 2020-08-11 PROCEDURE — 99204 PR OFFICE/OUTPT VISIT, NEW, LEVL IV, 45-59 MIN: ICD-10-PCS | Mod: S$PBB,,, | Performed by: ORTHOPAEDIC SURGERY

## 2020-08-11 PROCEDURE — 99999 PR PBB SHADOW E&M-EST. PATIENT-LVL IV: ICD-10-PCS | Mod: PBBFAC,GC,, | Performed by: STUDENT IN AN ORGANIZED HEALTH CARE EDUCATION/TRAINING PROGRAM

## 2020-08-11 PROCEDURE — 99204 OFFICE O/P NEW MOD 45 MIN: CPT | Mod: S$PBB,,, | Performed by: ORTHOPAEDIC SURGERY

## 2020-08-11 PROCEDURE — 99214 OFFICE O/P EST MOD 30 MIN: CPT | Mod: S$PBB,GC,, | Performed by: STUDENT IN AN ORGANIZED HEALTH CARE EDUCATION/TRAINING PROGRAM

## 2020-08-11 PROCEDURE — 99214 OFFICE O/P EST MOD 30 MIN: CPT | Mod: PBBFAC,27 | Performed by: STUDENT IN AN ORGANIZED HEALTH CARE EDUCATION/TRAINING PROGRAM

## 2020-08-11 PROCEDURE — 99214 OFFICE O/P EST MOD 30 MIN: CPT | Mod: PBBFAC | Performed by: ORTHOPAEDIC SURGERY

## 2020-08-11 PROCEDURE — 99999 PR PBB SHADOW E&M-EST. PATIENT-LVL IV: CPT | Mod: PBBFAC,GC,, | Performed by: STUDENT IN AN ORGANIZED HEALTH CARE EDUCATION/TRAINING PROGRAM

## 2020-08-11 PROCEDURE — 99214 PR OFFICE/OUTPT VISIT, EST, LEVL IV, 30-39 MIN: ICD-10-PCS | Mod: S$PBB,GC,, | Performed by: STUDENT IN AN ORGANIZED HEALTH CARE EDUCATION/TRAINING PROGRAM

## 2020-08-11 PROCEDURE — 99999 PR PBB SHADOW E&M-EST. PATIENT-LVL IV: ICD-10-PCS | Mod: PBBFAC,,, | Performed by: ORTHOPAEDIC SURGERY

## 2020-08-11 RX ORDER — MYCOPHENOLATE MOFETIL 200 MG/ML
750 POWDER, FOR SUSPENSION ORAL 2 TIMES DAILY
Qty: 480 ML | Refills: 1 | Status: SHIPPED | OUTPATIENT
Start: 2020-08-11 | End: 2020-12-29 | Stop reason: SDUPTHER

## 2020-08-11 RX ORDER — PREGABALIN 100 MG/1
100 CAPSULE ORAL 2 TIMES DAILY
Qty: 90 CAPSULE | Refills: 5 | Status: SHIPPED | OUTPATIENT
Start: 2020-08-11 | End: 2020-10-02 | Stop reason: SDUPTHER

## 2020-08-11 ASSESSMENT — ROUTINE ASSESSMENT OF PATIENT INDEX DATA (RAPID3)
PAIN SCORE: 4
FATIGUE SCORE: 0
AM STIFFNESS SCORE: 0, NO
PATIENT GLOBAL ASSESSMENT SCORE: 3
TOTAL RAPID3 SCORE: 3.89
PSYCHOLOGICAL DISTRESS SCORE: 0
MDHAQ FUNCTION SCORE: 1.4

## 2020-08-11 NOTE — PROGRESS NOTES
Subjective:       Patient ID: Yamel Shah is a 69 y.o. female.    Chief Complaint: F/u for systemic scleroderma disease    Follow up for Systemic scleroderma. Last seen by Dr. Ahuja and myself on 5/5/2020. On Mycophenolate since 6/2019.    Interval Hx: Since last visit, pt states she is doing well. Continues on Mycophenolate 500 mg BID (2.5 mls BID), no recent infections. States she feels her skin is looser and sees more wrinkles. LE swelling and ulcers stable, only 2 ulcers remaining on R ankle and forefoot. Has seen Dr. Leigh on 6/1: no progression of disease, no need for OFEV. She suspects that her occasional acute respiratory symptoms are related to her GERD. Plan for repeat PFTs in the fall. Pt saw ENT for tinnitus, stopped aldactone. Pt now off x few days, Dr. Lim aware. PT/OT and hand splint but too large. Pt has appt with Dr. Phelps hand sx today for customized splint. Recent labs unremarkable including CBC, CMP UA, UPCR. CRP 7.3 > 11.6 and ESR 45>43>45 always elevated. Monitors BP at home, well controlled today.    Endorses R foot 3rd toe with burning nerve pain intermittently x 2 wks. States improved with Lyrica, She called pain clinic for a refill but was told she needs to be seen in person. She had some Lyrica left over so is currently taking 100 mg qHS which helps the toe pain and chronic neck pain with DJD. Pt would like a refill of Lyrica 100 mg BID for now until she can get into pain clinic again. She was seen virtually June 2020 but they want to see her in person.          10/28 6 MWT : Six minute walk distance is 415.14 meters (1362 feet) with moderate dyspnea. During exercise, there was no desaturation while breathing room air. Both blood pressure and heart rate remained stable with walking. The patient reported non-pulmonary symptoms during exercise. Since the previous study in September 2019, exercise capacity is significantly improved. Based upon age and body mass index,  "exercise capacity is normal.     Follows with GI Dr. Mendoza, Patient is scheduled for 24 hr pH impedance test and Esophageal Manometry on 3/4/20. Showed "her gastric emptying scan shows significant delay if emptying of food from the stomach.  she should eat six small, low fat and low fiber meals per day."     Follows with pain management, Dr. Celis and NP Viktoriya Camara. Was on Lyrica to 100 mg BID. Pt was able to wean herself off as her pain has improved last visit.     Pt states she has been walking a lot per Dr. Claudio's ([facility] surgery) request. Will f/u in the future but for now LE ulcers improving and swelling decreased. Saw Dr. Lim for Pulm HTN, continues on Adcirca (tadalifil). UTD immunizations.           Initial Hx:   67YF with Systemic scleroderma, diagnosed 1983- She has been seeing Dr. Ahuja for over 10 years.( +SSA, +SCL-70, -RNAP3, ILD). She has stable ILD on imaging 9/2015 and had mild exercise induced pulmonary hypertension in 2013 that has resolved on 6/2015 RHC and most recent ECHO done 3/2019 shows normal EF with grade 2 diastolic dysfunction, increased sPAP 41 compared to 28mmHg in 06/2018. PFTs 4/2016 showed normal DLCO, RV, FEV1 with mild restriction. Repeat PFTs 03/2018 show normal DLco, decreased RV (89-->64), SVC remains the same at 70 and FVC 70. Repeat PFTs 03/2019 showed FVC 60, SVC 64, RV 61, DLco 82.      03/2019 CT chest/abd was done which showed stable appearing chronic interstitial lung disease consistent with patient's known history of scleroderma; there is new tree-in-bud opacity noted in the right upper lobe which while nonspecific can be seen in the setting of inflammatory infectious processes such as aspiration. Pt was referred to Dr Barry for evaluation of ILD.   As per Dr Barry note " Patient with significant scleroderma multiorgan involvement, Her PFTs reveal trend towards decrease in FVC,A 6MWT is not available at this time,Patient does have poor exercise function " "because of pain in the feet when she ambulates.  - She may benefit from starting on Immunosuppression with mycophenolate, although risks and benefit need to be reviewed further since she has had scleroderma since 1983. We initiated and provided the patient with basic information on the medication. She is going to review with Dr. Ahuja as well.   - Recommend to repeat PFTs w/DLCO and 6MWT (If patient can tolerate) every 6 months. May enroll in Pulmonary rehab once leg ulcers improve.     Seen by Dr Lim 03/2019 as per note "Given appearance of diastolic dysfxn on echo today and uncontrolled HTN/swelling, will go ahead and add aldactone 25mg daily today- needs BMP/BNP this am and again in 1 wk,no PH medicine changes today. From a PH standpoint, based on her echo and last RHC she appears to be doing very well- PFTs to be repeated per Dr Ahuja, chest imaging stable"     She had seen Vascular surgery in 03/2018 who will consider EVLT once ulcers have healed.  Pt is currently on nifedipine 90mg, pravastatin 20mg and adcirca 40mg     Recently retired in Dec 2018, worked as a .     Review of Systems   Constitutional: Negative for chills, fatigue, fever and unexpected weight change.   HENT: Negative for hearing loss and trouble swallowing.    Eyes: Negative for redness and visual disturbance.   Respiratory: Negative for cough and shortness of breath.    Cardiovascular: Negative for chest pain.   Gastrointestinal: Negative for abdominal pain, constipation and diarrhea.   Genitourinary: Negative for dysuria and genital sores.   Musculoskeletal: Negative for arthralgias, back pain and joint swelling.   Skin: Positive for wound. Negative for color change and rash.   Neurological: Negative for weakness and headaches.   Hematological: Does not bruise/bleed easily.   Psychiatric/Behavioral: Negative for agitation. The patient is not nervous/anxious.          Objective:   BP (!) 120/59   Pulse 80   Ht 5' 6" (1.676 m) "   Wt 71.7 kg (158 lb)   BMI 25.50 kg/m²      Physical Exam   Vitals reviewed.  Constitutional: She is oriented to person, place, and time and well-developed, well-nourished, and in no distress. No distress.   HENT:   Head: Normocephalic and atraumatic.   Right Ear: External ear normal.   Left Ear: External ear normal.   Nose: Nose normal.   Mouth/Throat: Oropharynx is clear and moist. No oropharyngeal exudate.   Eyes: Conjunctivae and EOM are normal. Pupils are equal, round, and reactive to light. Right eye exhibits no discharge. Left eye exhibits no discharge. No scleral icterus.   Neck: Neck supple. No thyromegaly present.   Cardiovascular: Normal rate, regular rhythm, normal heart sounds and intact distal pulses.    No murmur heard.  Pulmonary/Chest: Effort normal and breath sounds normal. No respiratory distress. She has no wheezes. She exhibits no tenderness.   Crackles b/l stable from prior exam   Abdominal: Soft. Bowel sounds are normal. She exhibits no distension. There is no abdominal tenderness. There is no guarding.   Neurological: She is alert and oriented to person, place, and time.   Skin: Skin is warm and dry. No rash noted. She is not diaphoretic. No erythema.     Psychiatric: Mood and affect normal.   Musculoskeletal: Normal range of motion. Deformity present. No tenderness or edema.      Comments: Please see pics from prior visit.  Skin tightness  Claw hand deformity, flexion contractures b/l  LE bandaged and covered today, improving. See pics form prior  Skin score: 34  LE ulcers improving overall but still w/2 ulcers and swelling LLE        Not performed 2/2 telemedicine.      SPEP 1/10/2020  Increased total protein.   Increased gamma globulin, polyclonal.      JASON 1/10/2020  No monoclonal peaks identified.      US Venous 06/2019  Impression:   Right Leg:  Color flow evaluation of the lower extremity demonstrates fully compressible and patent veins; however, due to heavy pitting edema, one of  the paired peroneal veins cannot be appreciated which could suggest chronic occlusive thrombus versus   poor imaging. There is no evidence of venous thrombosis in the remaining deep or superficial veins.  Significant reflux is noted in the GSV including the saphenofemoral junction (SFJ) and the SSV including the saphenopopliteal junction (SPJ). There is no   evidence of reflux in the Accessory vein. Many branches are noted throughout the entire course of the GSV and SSV.  Incidental finding of significant reflux is noted in the FV and POP V.    Left Leg:  Color flow evaluation of the lower extremity demonstrates chronic occlusive thrombus in a small segment of the GSV BK. There is no evidence of venous thrombosis in the remaining deep or superficial veins.  Significant reflux is noted in the   GSV including the saphenofemoral junction (SFJ) and the SSV including the saphenopopliteal junction (SPJ). There is no evidence of reflux in the Accessory vein. Noted is a diameter decrease in the GSV from 4.8 mm to 2.6 mm at the level of the distal   thigh and 1.9 mm at the knee.  Multiple branches of the GSV are also noted throughout its entire course. Incidental finding of significant reflux is noted in the FV and POP V.     EGD 01/2017  Normal esophagus. Two biopsies were obtained in                         the upper third of the esophagus.                        - Z-line irregular. Biopsied.                        - Esophageal polyp(s) were found. Resected and                         retrieved.                        - Normal stomach.                        - Congested duodenal mucosa. Biopsied.  Recommendation:  - Await pathology results.                        - Discharge patient to home (ambulatory).                        - Resume previous diet.                        - Continue present medications.                        - Repeat the upper endoscopy in 6 months to assure                         complete removal of  polyp at GE junction.        EGD 03/2019  Impression:    - Dilation in the entire esophagus.                        - Z-line irregular, 38 cm from the incisors.                         Biopsied.                        - 3 cm hiatal hernia.                        - Benign-appearing esophageal stenosis.                        - Normal stomach.                        - A few gastric polyps. Biopsied.                        - Normal examined duodenum. Biopsied.  Recommendation:  - Await pathology results.                        - Discharge patient to home (with escort).                        - Resume previous diet.     C-scope 03/2019  Impression:     - Five 2 to 5 mm polyps in the transverse colon,                         removed with a jumbo cold forceps. Resected and                         retrieved.                        - The entire examined colon is normal.                        - The distal rectum and anal verge are normal on                         retroflexion view.                        - The examined portion of the ileum was normal.  Recommendation:    - Discharge patient to home (with escort).     CT chest/abd 03/2019                   Impression         Stable appearing chronic interstitial lung disease consistent with patient's known history of scleroderma; it is worth noting that there is new tree-in-bud opacity noted in the right upper lobe which while nonspecific can be seen in the setting of inflammatory infectious processes such as aspiration.    Persistently fluid-filled esophagus which can place the patient at risk for aspiration         DEXA 09/2018  Osteopenia. There is a 11.9% risk of a major osteoporotic fracture and a 2.6% risk of hip fracture in the next 10 years (FRAX).  Compared with previous DXA, BMD at the lumbar spine has remained stable, and the BMD at the total hip has decreased by -7.4%     2D ECHO 03/2019  · Normal left ventricular systolic function. The estimated ejection  fraction is 60%  · Grade II (moderate) left ventricular diastolic dysfunction consistent with pseudonormalization.  · Elevated left atrial pressure.  · No wall motion abnormalities.  · Normal right ventricular systolic function.  · Mild left atrial enlargement.  · Mild mitral regurgitation.  · The estimated PA systolic pressure is 41 mm Hg  · Normal central venous pressure (3 mm Hg).     CT chest 09/2015  Findings:  Examination of the vascular and soft tissue structures at the base of the neck is unremarkable.  The thoracic aorta maintains normal caliber, contour, and course without significant atherosclerotic calcification within its course.  The heart is not enlarged and there is no evidence of pericardial effusion. The esophagus is mildly dilated and   retained pneumatized secretions are identified within the dependent portion of the esophagus placing the patient at risk for aspiration and also suggestive esophageal dysmotility associated with scleroderma or reflux. There is no axillary, mediastinal,   or hilar lymphadenopathy.    A benign appearing but enlarged left axillary lymph node is identified, which appears stable to the prior examination dated 11/27/07.  Scattered normal sized and benign appearing mediastinal lymphadenopathy is also appreciated, also grossly similar to   the prior examination.     The lungs again are symmetrically expanded and demonstrate chronic generalized lung disease with small opacities and reticulations in a peripheral predominance.  This again is most notable in the lung bases were there is dilatation of small airways and faint groundglass opacity.  Findings appear grossly stable to the prior chest CT in 2007 and are consistent with interstitial lung disease as can be seen with scleroderma, pulmonary fibrosis or interstitial pneumonitis.  No focal mass lesion is identified.Limited views of the abdomen demonstrate nothing unusual on this noncontrast examination.  The osseous  structures demonstrate mild degenerative changes and a stable appearing compression deformity of the T9 vertebral body.       PFTs       FVC     SVC      RV     DLco     1/10/20    74                                72  3/12/19    60         64         61      82  3/6/18      64         70         64      112  4/8/16      70         70         89       84  9/4/15      66         62         72       71  1/19/15    66           2/14/14    64                                 87         Assessment:       1. Scleroderma    2. ILD (interstitial lung disease)    3. Skin ulcers of both feet    4. Gastroesophageal reflux disease, esophagitis presence not specified    5. Pulmonary hypertension associated with systemic disorder    6. Sedimentation rate elevation    7. Venous insufficiency of both lower extremities    8. Osteopenia, unspecified location    9. Asymptomatic menopausal state             Plan:       Problem List Items Addressed This Visit        Derm    Skin ulcers of both feet    Relevant Orders    Ambulatory referral/consult to Wound Clinic       Pulmonary    Pulmonary hypertension associated with systemic disorder (Chronic)    ILD (interstitial lung disease)    Relevant Orders    Complete PFT with bronchodilator    PULSE OXIMETRY WITH REST - PULM    COVID-19 Routine Screening       Cardiac/Vascular    Venous insufficiency of both lower extremities       GI    GERD (gastroesophageal reflux disease)       Orthopedic    Osteopenia    Relevant Orders    DXA Bone Density Spine And Hip      Other Visit Diagnoses     Scleroderma    -  Primary    Relevant Medications    mycophenolate mofetil (CELLCEPT) 200 mg/mL SusR    pregabalin (LYRICA) 100 MG capsule    Sedimentation rate elevation        Asymptomatic menopausal state         Relevant Orders    DXA Bone Density Spine And Hip         Follow up for Systemic scleroderma. Last seen by Dr. Ahuja and myself on 5/5/2020. On Mycophenolate since 6/2019.    Interval Hx: Since  "last visit, pt states she is doing well. Continues on Mycophenolate 500 mg BID (2.5 mls BID), no recent infections. States she feels her skin is looser and sees more wrinkles. LE swelling and ulcers stable, only 2 ulcers remaining on R ankle and forefoot. Pt saw ENT for tinnitus, stopped aldactone. Pt now off x few days, Dr. Lim aware. PT/OT and hand splint but too large. Pt has appt with Dr. Phelps hand sx today for customized splint. Recent labs unremarkable including CBC, CMP UA, UPCR. CRP 7.3 > 11.6 and ESR 45>43>45 always elevated. Monitors BP at home, well controlled today.    Has seen Dr. Leigh on 6/1. FVC and DLCO are stable therefore Dr. Leigh does not suspect any progression of her ILD.  She suspects that her occasional acute respiratory symptoms are related to her GERD and encouraged GI follow-up with Dr. Mendoza. Monitor PFTs ever 6 months.  Given her GI issues and stability in her PFTs, do not think she would benefit from OFEV at this time. Continue with Adcirca.     Follows with GI Dr. Mendoza, Patient is scheduled for 24 hr pH impedance test and Esophageal Manometry on 3/4/20. Showed "her gastric emptying scan shows significant delay if emptying of food from the stomach.  she should eat six small, low fat and low fiber meals per day."     Follows with pain management, Dr. Celis and NP Viktoriya Camara. Was on Lyrica to 100 mg BID, needs refill.     Pt states she has been walking a lot per Dr. Claudio's (vas surgery) request. Will f/u in the future but for now LE ulcers improving and swelling decreased. Saw Dr. Lim for Pulm HTN, continues on Adcirca (tadalifil). UTD immunizations.     Recent labs: Unremarkable CBC and CMP. Persistently elevated ESR 45 and CRP wnl. 12/8/19 CXR: No interval detrimental change when compared to radiograph dated 11/12/2019.  Stable chronic findings.     - mycophenolate mofetil (CELLCEPT) 200 mg/mL SusR; Take 3.75 mLs (750 mg total) by mouth 2 (two) times daily.  " Dispense: 480 mL; Refill: 1  - pregabalin (LYRICA) 100 MG capsule; Take 1 capsule (100 mg total) by mouth 2 (two) times daily.  Dispense: 90 capsule; Refill: 5  - Complete PFT with bronchodilator; Future  - PULSE OXIMETRY WITH REST - PULM; Future  - COVID-19 Routine Screening  - Ambulatory referral/consult to Wound Clinic; Future  - DXA Bone Density Spine And Hip; Future      Plan:  - Reviewed recent CBC, CMP, ESR, CRP. Inflammatory markers with persistently elevated ESR and CRP. wnl UA and UPCR.  - Increase Mycophenolate 500 mg BID (liquid formulation= 2.5 ml BID) to 750 mg BID (3.75 ml BID). Discussed importance of taking medication twice a day rather than daily  -  CBC in 2 wks, then CBC and CMP in 4 wks. Check lipid panel at next appt  - Discussed to hold mycophenolate for any infections or antibiotic use  -  DXA  - Referral to wound clinic and f/u in Vascular sx clinic with Dr. Claudio  - pt to monitor BP at home and message if consistently elevated > 120/80  - cont following with pulm Dr. Leigh with repeat PFTs. PFTs ordered.  - Cont to f/u with GI Dr. Mendoza as needed. Information on low fiber diet and DASH diet sent to pt via portal.  - PT/OT for deformity of hands and need of hand splint. Splint was too large. Appt with hand sx Dr. Phelps today for customized splint  - Cont to f/u with ENT as need  - UTD vaccines  - will message pt with quad exercises  - refilled Lyrica 100 mg BID until she can be seen in pain clinic    Discussed with Dr. Ahuja. RTC 3 months with standing labs prior.     Gretchen Madison DO  Rheumatology, PGY5

## 2020-08-11 NOTE — PROGRESS NOTES
[facility]  History & Physical  Orthopedics    SUBJECTIVE:      COVID-19 attestation:  This patient was treated during the COVID-19 pandemic.  This was discussed with the patient, they are aware of our current policies and procedures, were given the option of delaying their visit and or switching to a virtual visit, delaying their surgery when applicable, and they elect to proceed.    Chief Complaint: Need new left wrist splint    Referring Provider: Katherine Galdamez NP     History of Present Illness:  Patient is a 69 y.o. left hand dominant female who presents today with complaints of needing a new, better fitted brace. Pt states she has had scleroderma since the 80s and progressive stiffness in her hand. Her rheumatologist recommenced she see OT for custom splints.  The custom splint was made but she feels it does not fit her well and will not keep her motion in her fingers from degrading. She states her right hand is a 'lost cause'. Pt denies any pain, numbness, or new symptoms.     The patient is a/an does not work.    Onset of symptoms/DOI was 1980s.    Symptoms are aggravated by activity and at night.    Symptoms are alleviated by rest and immobilization.    Symptoms consist of decreased ROM.    The patient rates their pain as a 2/10.    Attempted treatment(s) and/or interventions include physical and/or occupational therapy, immobilization and splinting/casting.     The patient denies any fevers, chills, N/V, D/C and presents for evaluation.       Past Medical History:   Diagnosis Date    Abnormal Pap smear     Acid reflux     Allergy     Arthritis     GERD (gastroesophageal reflux disease)     History of migraine headaches     Hypertension     Idiopathic neuropathy 7/20/2012    ILD (interstitial lung disease) 11/6/2013    Iron deficiency anemia 3/18/2014    MRSA carrier     Osteopenia     Pneumonia     Pulmonary fibrosis     Pulmonary hypertension     Raynaud's  disease     Scleroderma, diffuse     Sjogren's syndrome     Vitamin D deficiency 11/14/2013     Past Surgical History:   Procedure Laterality Date    24 HOUR IMPEDANCE PH MONITORING OF ESOPHAGUS IN PATIENT NOT TAKING ACID REDUCING MEDICATIONS N/A 3/4/2020    Procedure: IMPEDANCE PH STUDY, ESOPHAGEAL, 24 HOUR, IN PATIENT NOT TAKING ACID REDUCING MEDICATION;  Surgeon: Annamaria Mendoza MD;  Location: [facility] (4TH FLR);  Service: Endoscopy;  Laterality: N/A;  OFF PPI/H2 Blocker   Motility Studies   Hold Narcotics x 1 days   Hold TCA x 1 days  2/26 - LVM attempting to confirm appt  2/27 - Confirmed appt    BREAST BIOPSY      Left, benign    CERVICAL CONIZATION   W/ LASER  1970    COLONOSCOPY      COLONOSCOPY N/A 3/29/2019    Procedure: COLONOSCOPY;  Surgeon: Annamaria Mendoza MD;  Location: [facility] (2ND FLR);  Service: Endoscopy;  Laterality: N/A;    DILATION AND CURETTAGE OF UTERUS      ESOPHAGEAL MANOMETRY WITH MEASUREMENT OF IMPEDANCE N/A 3/4/2020    Procedure: MANOMETRY, ESOPHAGUS, WITH IMPEDANCE MEASUREMENT;  Surgeon: Annamaria Mendoza MD;  Location: [facility] (4TH FLR);  Service: Endoscopy;  Laterality: N/A;  OFF PPI/H2 Blocker   Motility Studies   Hold Narcotics x 1 days   Hold TCA x 1 days    ESOPHAGOGASTRODUODENOSCOPY      ESOPHAGOGASTRODUODENOSCOPY N/A 3/29/2019    Procedure: EGD (ESOPHAGOGASTRODUODENOSCOPY);  Surgeon: Annamaria Mendoza MD;  Location: [facility] (2ND FLR);  Service: Endoscopy;  Laterality: N/A;  pulmonary htn    HYSTERECTOMY  1990    FRANDY (AUB, Fibroids), ovaries remain     Review of patient's allergies indicates:   Allergen Reactions    Sulfa (sulfonamide antibiotics)      Other reaction(s): Rash     Social History     Social History Narrative         Family History   Problem Relation Age of Onset    Breast cancer Mother     No Known Problems Daughter     No Known Problems Son     No Known Problems Daughter     No Known Problems Son     Breast cancer Sister      Breast cancer Maternal Aunt     Osteoarthritis Brother     Melanoma Neg Hx     Colon cancer Neg Hx     Crohn's disease Neg Hx     Stomach cancer Neg Hx     Ulcerative colitis Neg Hx     Rectal cancer Neg Hx     Irritable bowel syndrome Neg Hx     Esophageal cancer Neg Hx     Celiac disease Neg Hx     Ovarian cancer Neg Hx          Current Outpatient Medications:     albuterol (VENTOLIN HFA) 90 mcg/actuation inhaler, Inhale 2 puffs into the lungs every 4 (four) hours as needed for Wheezing. Rescue, Disp: 18 g, Rfl: 1    carboxymethylcellulose sodium (REFRESH TEARS) 0.5 % Drop, Apply 1-2 drops to every as needed, Disp: , Rfl:     ergocalciferol (ERGOCALCIFEROL) 50,000 unit Cap, Take 1 capsule (50,000 Units total) by mouth every 7 days., Disp: 12 capsule, Rfl: 1    multivitamin capsule, Take 1 capsule by mouth once daily. , Disp: , Rfl:     mycophenolate mofetil (CELLCEPT) 200 mg/mL SusR, Take 3.75 mLs (750 mg total) by mouth 2 (two) times daily., Disp: 480 mL, Rfl: 1    NIFEdipine (ADALAT CC) 90 MG TbSR, TAKE 1 TABLET(90 MG) BY MOUTH EVERY DAY, Disp: 90 tablet, Rfl: 3    omeprazole (PRILOSEC) 40 MG capsule, , Disp: , Rfl:     pravastatin (PRAVACHOL) 20 MG tablet, TAKE 1 TABLET BY MOUTH EVERY EVENING, Disp: 90 tablet, Rfl: 0    pregabalin (LYRICA) 100 MG capsule, Take 1 capsule (100 mg total) by mouth 2 (two) times daily., Disp: 90 capsule, Rfl: 5    PREVIDENT 5000 SENSITIVE 1.1-5 % Pste, BRUSH FOR 2 MINUTES TWICE PER DAY, Disp: , Rfl:     spironolactone (ALDACTONE) 25 MG tablet, TAKE 1 TABLET(25 MG) BY MOUTH EVERY DAY, Disp: 30 tablet, Rfl: 11    tadalafil (ADCIRCA) 20 mg Tab, Take 2 tablets (40 mg total) by mouth once daily., Disp: 28 tablet, Rfl: 11    traZODone (DESYREL) 50 MG tablet, Take 1 tablet (50 mg total) by mouth every evening., Disp: 30 tablet, Rfl: 4    conjugated estrogens (PREMARIN) vaginal cream, Place 1 g vaginally twice a week. Place pea sized amount to vagina twice per day  "for two weeks then twice per week therafter, Disp: 30 g, Rfl: 12      Review of Systems:  Constitutional: no fever or chills  Eyes: no visual changes  ENT: no nasal congestion or sore throat  Respiratory: no cough or shortness of breath  Cardiovascular: no chest pain  Gastrointestinal: no nausea or vomiting, tolerating diet  Musculoskeletal: arthralgias and decreased ROM    OBJECTIVE:      Vital Signs (Most Recent):  Vitals:    08/11/20 1040   Weight: 70.5 kg (155 lb 6.8 oz)   Height: 5' 6" (1.676 m)     Body mass index is 25.09 kg/m².      Physical Exam:  Constitutional: The patient appears well-developed and well-nourished. No distress.   Head: Normocephalic and atraumatic.   Nose: Nose normal.   Eyes: Conjunctivae and EOM are normal.   Neck: No tracheal deviation present.   Cardiovascular: Normal rate and intact distal pulses.    Pulmonary/Chest: Effort normal. No respiratory distress.   Abdominal: There is no guarding.   Neurological: The patient is alert.   Psychiatric: The patient has a normal mood and affect.     Bilateral Hand/Wrist Examination:    Observation/Inspection:  Swelling  none    Deformity  End stage contraction of right hand, milder on left but very advanced contracture   Discoloration  none     Scars   none    Atrophy  none    HAND/WRIST EXAMINATION:  Unable to passively extend any digits of her right hand  Much more motion of left hand particularly at MCPJ    Neurovascular Exam:  Digits WWP, brisk CR < 3s throughout  NVI motor/LTS to M/R/U nerves, radial pulse 2+  Tinel's Test - Carpal Tunnel  Neg  Tinel's Test - Cubital Tunnel  Neg  Phalen's Test    Neg  Median Nerve Compression Test Neg    ROM hand/wrist/elbow full, painless        Diagnostic Results:     Xray - Carpal fusion, advanced degenerative changes of the MCPs, PIPs, DIPs.   EMG - None    ASSESSMENT/PLAN:      Yamel Shah is a 69 y.o. female with scleroderma with hand contractures.   Plan:   1) She would like to have a new " brace for her left hand and wrist  2) I will refer her for new splint with OT  3) RTC PRAURELIA Phelps M.D.     Please be aware that this note has been generated with the assistance of [employer] voice-to-text.  Please excuse any spelling or grammatical errors.

## 2020-08-11 NOTE — LETTER
August 11, 2020      Katherine Galdamez, NP  [address]  [facility] [address]           [facility] - Orthopedics  [address]  [facility] [address]  Phone: [phone]          Patient: Yamel Shah   MR Number: [id number]   YOB: 1950   Date of Visit: 8/11/2020       Dear Katherine Galdamez:    Thank you for referring Yamel Shah to me for evaluation. Attached you will find relevant portions of my assessment and plan of care.    If you have questions, please do not hesitate to call me. I look forward to following Yamel Shah along with you.    Sincerely,    Ruben Phelps MD    Enclosure  CC:  No Recipients    If you would like to receive this communication electronically, please contact [email or web address] or [phone] to request more information on [employer] Link access.    For providers and/or their staff who would like to refer a patient to [facility], please contact us through our one-stop-shop provider referral line, [facility], at [phone].    If you feel you have received this communication in error or would no longer like to receive these types of communications, please e-mail [email or web address]

## 2020-08-17 ENCOUNTER — OFFICE VISIT (OUTPATIENT)
Dept: WOUND CARE | Facility: CLINIC | Age: 70
End: 2020-08-17
Payer: MEDICARE

## 2020-08-17 ENCOUNTER — TELEPHONE (OUTPATIENT)
Dept: REHABILITATION | Facility: HOSPITAL | Age: 70
End: 2020-08-17

## 2020-08-17 VITALS
HEIGHT: 66 IN | WEIGHT: 154.31 LBS | SYSTOLIC BLOOD PRESSURE: 124 MMHG | HEART RATE: 72 BPM | TEMPERATURE: 97 F | DIASTOLIC BLOOD PRESSURE: 76 MMHG | BODY MASS INDEX: 24.8 KG/M2

## 2020-08-17 DIAGNOSIS — L97.529 SKIN ULCERS OF BOTH FEET: ICD-10-CM

## 2020-08-17 DIAGNOSIS — L97.519 SKIN ULCERS OF BOTH FEET: ICD-10-CM

## 2020-08-17 PROCEDURE — 99999 PR PBB SHADOW E&M-EST. PATIENT-LVL V: CPT | Mod: PBBFAC,,, | Performed by: NURSE PRACTITIONER

## 2020-08-17 PROCEDURE — 99215 OFFICE O/P EST HI 40 MIN: CPT | Mod: PBBFAC | Performed by: NURSE PRACTITIONER

## 2020-08-17 PROCEDURE — 99213 PR OFFICE/OUTPT VISIT, EST, LEVL III, 20-29 MIN: ICD-10-PCS | Mod: S$PBB,,, | Performed by: NURSE PRACTITIONER

## 2020-08-17 PROCEDURE — 99213 OFFICE O/P EST LOW 20 MIN: CPT | Mod: S$PBB,,, | Performed by: NURSE PRACTITIONER

## 2020-08-17 PROCEDURE — 99999 PR PBB SHADOW E&M-EST. PATIENT-LVL V: ICD-10-PCS | Mod: PBBFAC,,, | Performed by: NURSE PRACTITIONER

## 2020-08-17 NOTE — PROGRESS NOTES
Subjective:       Patient ID: Yamel Shah is a 69 y.o. female.    Chief Complaint: Follow-up    HPI     69 y.o. female presents for evaluation and treatment of ulcers to right foot that have been present on and off since 2015.  She is known to this clinic, last seen 4/2018 for ulcers to her feet. She has a history of Raynaud's disease and scleroderma with progressive contraction and stiffness to her hands, R>L.  Current wound care is OTC antibiotic ointment to wounds every other day and covering with dry dressing.  States wounds have improved with the ointment.  States medihoney she used in the past made the wounds worse and debridement of wounds make it worse also. Her wound healing is complicated by scleroderma and venous insufficiency.  She has venous US 6/2019 and has f/u with vascular 8/26/2020.     Past Medical History:   Diagnosis Date    Abnormal Pap smear     Acid reflux     Allergy     Arthritis     GERD (gastroesophageal reflux disease)     History of migraine headaches     Hypertension     Idiopathic neuropathy 7/20/2012    ILD (interstitial lung disease) 11/6/2013    Iron deficiency anemia 3/18/2014    MRSA carrier     Osteopenia     Pneumonia     Pulmonary fibrosis     Pulmonary hypertension     Raynaud's disease     Scleroderma, diffuse     Sjogren's syndrome     Vitamin D deficiency 11/14/2013     Past Surgical History:   Procedure Laterality Date    24 HOUR IMPEDANCE PH MONITORING OF ESOPHAGUS IN PATIENT NOT TAKING ACID REDUCING MEDICATIONS N/A 3/4/2020    Procedure: IMPEDANCE PH STUDY, ESOPHAGEAL, 24 HOUR, IN PATIENT NOT TAKING ACID REDUCING MEDICATION;  Surgeon: Annamaria Mendoza MD;  Location: [facility] ([address] [place] [address]);  Service: Endoscopy;  Laterality: N/A;  OFF PPI/H2 Blocker   Motility Studies   Hold Narcotics x 1 days   Hold TCA x 1 days  2/26 - LVM attempting to confirm appt  2/27 - Confirmed appt    BREAST BIOPSY      Left, benign    CERVICAL CONIZATION   W/ LASER   1970    COLONOSCOPY      COLONOSCOPY N/A 3/29/2019    Procedure: COLONOSCOPY;  Surgeon: Annamaria Mendoza MD;  Location: [facility] (2ND FLR);  Service: Endoscopy;  Laterality: N/A;    DILATION AND CURETTAGE OF UTERUS      ESOPHAGEAL MANOMETRY WITH MEASUREMENT OF IMPEDANCE N/A 3/4/2020    Procedure: MANOMETRY, ESOPHAGUS, WITH IMPEDANCE MEASUREMENT;  Surgeon: Annamaria Mendoza MD;  Location: [facility] (4TH FLR);  Service: Endoscopy;  Laterality: N/A;  OFF PPI/H2 Blocker   Motility Studies   Hold Narcotics x 1 days   Hold TCA x 1 days    ESOPHAGOGASTRODUODENOSCOPY      ESOPHAGOGASTRODUODENOSCOPY N/A 3/29/2019    Procedure: EGD (ESOPHAGOGASTRODUODENOSCOPY);  Surgeon: Annamaria Mendoza MD;  Location: [facility] (2ND FLR);  Service: Endoscopy;  Laterality: N/A;  pulmonary htn    HYSTERECTOMY  1990    FRANDY (AUB, Fibroids), ovaries remain          Review of Systems   Constitutional: Negative for activity change, chills, diaphoresis, fatigue and fever.   Respiratory: Negative for apnea, cough, chest tightness and shortness of breath.    Cardiovascular: Negative for chest pain, palpitations and leg swelling.   Musculoskeletal: Negative for gait problem and joint swelling.   Skin: Positive for wound. Negative for pallor and rash.   Neurological: Negative for syncope, weakness and numbness.   Psychiatric/Behavioral: Negative for agitation. The patient is not nervous/anxious.    All other systems reviewed and are negative.            Objective:      Physical Exam  Vitals signs reviewed.   Constitutional:       General: She is not in acute distress.     Appearance: She is well-developed.   HENT:      Head: Normocephalic and atraumatic.   Eyes:      Pupils: Pupils are equal, round, and reactive to light.   Neck:      Musculoskeletal: Normal range of motion.   Cardiovascular:      Rate and Rhythm: Normal rate.   Pulmonary:      Effort: Pulmonary effort is normal. No respiratory distress.   Musculoskeletal:         General:  Deformity (hands bilaterally) present.   Skin:     General: Skin is warm and dry.      Capillary Refill: Capillary refill takes less than 2 seconds.   Neurological:      Mental Status: She is alert and oriented to person, place, and time.   Psychiatric:         Judgment: Judgment normal.         Assessment:       1. Skin ulcers of right feet           Wound 08/17/20 0916 Ulceration Right lateral Leg #1 (Active)   08/17/20 0916    Pre-existing: Yes   Primary Wound Type: Ulceration   Side: Right   Orientation: lateral   Location: Leg   Wound Number (optional): #1   Ankle-Brachial Index:    Pulses:    Removal Indication and Assessment:    Wound Outcome:    (Retired) Wound Type:    (Retired) Wound Length (cm):    (Retired) Wound Width (cm):    (Retired) Depth (cm):    Wound Description (Comments):    Removal Indications:    Wound Image   08/17/20 0916   Dressing Appearance Dry;Intact 08/17/20 0916   Drainage Amount Scant 08/17/20 0916   Drainage Characteristics/Odor Clear 08/17/20 0916   Appearance Eschar;Red;Fibrin 08/17/20 0916   Tissue loss description Partial thickness 08/17/20 0916   Black (%), Wound Tissue Color 25 % 08/17/20 0916   Red (%), Wound Tissue Color 75 % 08/17/20 0916   Periwound Area Intact 08/17/20 0916   Wound Edges Undefined 08/17/20 0916   Wound Length (cm) 1 cm 08/17/20 0916   Wound Width (cm) 1.7 cm 08/17/20 0916   Wound Depth (cm) 0.1 cm 08/17/20 0916   Wound Volume (cm^3) 0.17 cm^3 08/17/20 0916   Wound Surface Area (cm^2) 1.7 cm^2 08/17/20 0916            Wound 08/17/20 0919 Ulceration Right dorsal Foot #2 (Active)   08/17/20 0919    Pre-existing: Yes   Primary Wound Type: Ulceration   Side: Right   Orientation: dorsal   Location: Foot   Wound Number (optional): #2   Ankle-Brachial Index:    Pulses:    Removal Indication and Assessment:    Wound Outcome:    (Retired) Wound Type:    (Retired) Wound Length (cm):    (Retired) Wound Width (cm):    (Retired) Depth (cm):    Wound Description  (Comments):    Removal Indications:    Wound Image   08/17/20 0919   Dressing Appearance Dry;Intact 08/17/20 0919   Drainage Amount Scant 08/17/20 0919   Drainage Characteristics/Odor Clear 08/17/20 0919   Appearance Red;Fibrin 08/17/20 0919   Tissue loss description Partial thickness 08/17/20 0919   Red (%), Wound Tissue Color 100 % 08/17/20 0919   Periwound Area Intact 08/17/20 0919   Wound Edges Undefined 08/17/20 0919   Wound Length (cm) 1 cm 08/17/20 0919   Wound Width (cm) 0.7 cm 08/17/20 0919   Wound Depth (cm) 0.1 cm 08/17/20 0919   Wound Volume (cm^3) 0.07 cm^3 08/17/20 0919   Wound Surface Area (cm^2) 0.7 cm^2 08/17/20 0919           Plan:       Clean wounds with mild soap and water and pat dry  Hydrofera blue ready to wounds and cover with dry dressing, change every 5 days and PRN if soiled   Do not get wound dressings wet, if wet remove soiled dressings and apply new dressings  Observe for signs and symptoms of infection and report symptoms to clinic  RTC 3 weeks

## 2020-08-17 NOTE — PATIENT INSTRUCTIONS
Clean wounds with mild soap and water and pat dry  Apply hydrofera blue ready to wounds and cover with dry dressing, change dressing every 5 days and as needed if soiled  Do not get wound dressings wet, if wet remove soiled dressings and apply new dressings  Observe for signs and symptoms of infection and report symptoms to clinic

## 2020-08-17 NOTE — LETTER
August 17, 2020      Gretchen Madison MD  [address]  [facility] [address]           [facility]  [address]  [facility] [address]  Phone: [phone]          Patient: Yamel Shah   MR Number: [id number]   YOB: 1950   Date of Visit: 8/17/2020       Dear Dr. Gretchen Madison:    Thank you for referring Yamel Shah to me for evaluation. Attached you will find relevant portions of my assessment and plan of care.    If you have questions, please do not hesitate to call me. I look forward to following Yamel Shah along with you.    Sincerely,    Diana Ortiz, SEAN    Enclosure  CC:  No Recipients    If you would like to receive this communication electronically, please contact [email or web address] or [phone] to request more information on [employer] access.    For providers and/or their staff who would like to refer a patient to [facility], please contact us through our one-stop-shop provider referral line, [facility], at [phone].    If you feel you have received this communication in error or would no longer like to receive these types of communications, please e-mail [email or web address]

## 2020-08-26 ENCOUNTER — OFFICE VISIT (OUTPATIENT)
Dept: VASCULAR SURGERY | Facility: CLINIC | Age: 70
End: 2020-08-26
Payer: MEDICARE

## 2020-08-26 ENCOUNTER — LAB VISIT (OUTPATIENT)
Dept: LAB | Facility: HOSPITAL | Age: 70
End: 2020-08-26
Attending: INTERNAL MEDICINE
Payer: MEDICARE

## 2020-08-26 ENCOUNTER — TELEPHONE (OUTPATIENT)
Dept: VASCULAR SURGERY | Facility: CLINIC | Age: 70
End: 2020-08-26

## 2020-08-26 VITALS
WEIGHT: 155.19 LBS | TEMPERATURE: 98 F | BODY MASS INDEX: 25.85 KG/M2 | SYSTOLIC BLOOD PRESSURE: 119 MMHG | HEIGHT: 65 IN | RESPIRATION RATE: 20 BRPM | DIASTOLIC BLOOD PRESSURE: 62 MMHG | HEART RATE: 84 BPM

## 2020-08-26 DIAGNOSIS — I87.2 VENOUS INSUFFICIENCY OF BOTH LOWER EXTREMITIES: Primary | ICD-10-CM

## 2020-08-26 DIAGNOSIS — M34.9 SCLERODERMA: ICD-10-CM

## 2020-08-26 LAB
BASOPHILS # BLD AUTO: 0.02 K/UL (ref 0–0.2)
BASOPHILS NFR BLD: 0.4 % (ref 0–1.9)
DIFFERENTIAL METHOD: ABNORMAL
EOSINOPHIL # BLD AUTO: 0.1 K/UL (ref 0–0.5)
EOSINOPHIL NFR BLD: 0.9 % (ref 0–8)
ERYTHROCYTE [DISTWIDTH] IN BLOOD BY AUTOMATED COUNT: 15.6 % (ref 11.5–14.5)
HCT VFR BLD AUTO: 38.5 % (ref 37–48.5)
HGB BLD-MCNC: 12.4 G/DL (ref 12–16)
IMM GRANULOCYTES # BLD AUTO: 0.01 K/UL (ref 0–0.04)
IMM GRANULOCYTES NFR BLD AUTO: 0.2 % (ref 0–0.5)
LYMPHOCYTES # BLD AUTO: 2.1 K/UL (ref 1–4.8)
LYMPHOCYTES NFR BLD: 37.8 % (ref 18–48)
MCH RBC QN AUTO: 30.2 PG (ref 27–31)
MCHC RBC AUTO-ENTMCNC: 32.2 G/DL (ref 32–36)
MCV RBC AUTO: 94 FL (ref 82–98)
MONOCYTES # BLD AUTO: 0.5 K/UL (ref 0.3–1)
MONOCYTES NFR BLD: 8.6 % (ref 4–15)
NEUTROPHILS # BLD AUTO: 2.9 K/UL (ref 1.8–7.7)
NEUTROPHILS NFR BLD: 52.1 % (ref 38–73)
NRBC BLD-RTO: 0 /100 WBC
PLATELET # BLD AUTO: 181 K/UL (ref 150–350)
PMV BLD AUTO: 13.3 FL (ref 9.2–12.9)
RBC # BLD AUTO: 4.1 M/UL (ref 4–5.4)
WBC # BLD AUTO: 5.47 K/UL (ref 3.9–12.7)

## 2020-08-26 PROCEDURE — 36415 COLL VENOUS BLD VENIPUNCTURE: CPT | Mod: PN

## 2020-08-26 PROCEDURE — 99215 OFFICE O/P EST HI 40 MIN: CPT | Mod: PBBFAC | Performed by: SURGERY

## 2020-08-26 PROCEDURE — 99999 PR PBB SHADOW E&M-EST. PATIENT-LVL V: ICD-10-PCS | Mod: PBBFAC,,, | Performed by: SURGERY

## 2020-08-26 PROCEDURE — 99214 OFFICE O/P EST MOD 30 MIN: CPT | Mod: S$PBB,,, | Performed by: SURGERY

## 2020-08-26 PROCEDURE — 99999 PR PBB SHADOW E&M-EST. PATIENT-LVL V: CPT | Mod: PBBFAC,,, | Performed by: SURGERY

## 2020-08-26 PROCEDURE — 99214 PR OFFICE/OUTPT VISIT, EST, LEVL IV, 30-39 MIN: ICD-10-PCS | Mod: S$PBB,,, | Performed by: SURGERY

## 2020-08-26 PROCEDURE — 85025 COMPLETE CBC W/AUTO DIFF WBC: CPT

## 2020-08-26 RX ORDER — ALPRAZOLAM 0.5 MG/1
0.5 TABLET ORAL ONCE AS NEEDED
Qty: 2 TABLET | Refills: 0 | Status: SHIPPED | OUTPATIENT
Start: 2020-08-26 | End: 2020-10-16

## 2020-08-26 RX ORDER — MELOXICAM 7.5 MG/1
7.5 TABLET ORAL 2 TIMES DAILY
Qty: 10 TABLET | Refills: 0 | Status: SHIPPED | OUTPATIENT
Start: 2020-08-26 | End: 2020-08-31

## 2020-08-26 NOTE — PROGRESS NOTES
Yamel Shah  08/26/2020    HPI:  Patient is a 69 y.o. female h/o scleroderma (recent rx with iv veletri / prostaglandin) who is here today for f/u -  She presents with bilateral lower extremity venous ulcers, these began in February 2015; healed in summer 2015.   She also reports abut a 10 year history of venous ulcers, which in the past have always responded to compression dressings. She did have a 1 year period of time immediately preceding this most recent episode where she did not have ulcers.  Now has toe ulcers thought to be from sclerodema  - s/p recent iv veletri     No MI/stroke  Tobacco use: no  History of DVT/PE: no    Has done local care to area for years; not tolerated compression  Pain interfers with daily activities; has attempted elevation and analgesics with minimal relief and pain persists.    Past Medical History:   Diagnosis Date    Abnormal Pap smear     Acid reflux     Allergy     Arthritis     GERD (gastroesophageal reflux disease)     History of migraine headaches     Hypertension     Idiopathic neuropathy 7/20/2012    ILD (interstitial lung disease) 11/6/2013    Iron deficiency anemia 3/18/2014    MRSA carrier     Osteopenia     Pneumonia     Pulmonary fibrosis     Pulmonary hypertension     Raynaud's disease     Scleroderma, diffuse     Sjogren's syndrome     Vitamin D deficiency 11/14/2013     Past Surgical History:   Procedure Laterality Date    24 HOUR IMPEDANCE PH MONITORING OF ESOPHAGUS IN PATIENT NOT TAKING ACID REDUCING MEDICATIONS N/A 3/4/2020    Procedure: IMPEDANCE PH STUDY, ESOPHAGEAL, 24 HOUR, IN PATIENT NOT TAKING ACID REDUCING MEDICATION;  Surgeon: Annamaria Mendoza MD;  Location: [facility] ([address] [place] [address]);  Service: Endoscopy;  Laterality: N/A;  OFF PPI/H2 Blocker   Motility Studies   Hold Narcotics x 1 days   Hold TCA x 1 days  2/26 - LVM attempting to confirm appt  2/27 - Confirmed appt    BREAST BIOPSY      Left, benign    CERVICAL CONIZATION   W/  LASER  1970    COLONOSCOPY      COLONOSCOPY N/A 3/29/2019    Procedure: COLONOSCOPY;  Surgeon: Annamaria Mendoza MD;  Location: [facility] ENDO (2ND FLR);  Service: Endoscopy;  Laterality: N/A;    DILATION AND CURETTAGE OF UTERUS      ESOPHAGEAL MANOMETRY WITH MEASUREMENT OF IMPEDANCE N/A 3/4/2020    Procedure: MANOMETRY, ESOPHAGUS, WITH IMPEDANCE MEASUREMENT;  Surgeon: Annamaria Mendoza MD;  Location: [facility] ENDO (4TH FLR);  Service: Endoscopy;  Laterality: N/A;  OFF PPI/H2 Blocker   Motility Studies   Hold Narcotics x 1 days   Hold TCA x 1 days    ESOPHAGOGASTRODUODENOSCOPY      ESOPHAGOGASTRODUODENOSCOPY N/A 3/29/2019    Procedure: EGD (ESOPHAGOGASTRODUODENOSCOPY);  Surgeon: Annamaria Mendoza MD;  Location: [facility] AUGUSTIN (2ND FLR);  Service: Endoscopy;  Laterality: N/A;  pulmonary htn    HYSTERECTOMY  1990    FRANDY (AUB, Fibroids), ovaries remain     Family History   Problem Relation Age of Onset    Breast cancer Mother     No Known Problems Daughter     No Known Problems Son     No Known Problems Daughter     No Known Problems Son     Breast cancer Sister     Breast cancer Maternal Aunt     Osteoarthritis Brother     Melanoma Neg Hx     Colon cancer Neg Hx     Crohn's disease Neg Hx     Stomach cancer Neg Hx     Ulcerative colitis Neg Hx     Rectal cancer Neg Hx     Irritable bowel syndrome Neg Hx     Esophageal cancer Neg Hx     Celiac disease Neg Hx     Ovarian cancer Neg Hx      Social History     Socioeconomic History    Marital status:      Spouse name: Lewis    Number of children: 4    Years of education: Not on file    Highest education level: Not on file   Occupational History    Occupation: -retired     Employer: [employer]     Comment: [employer]     Employer: RETIRED   Social Needs    Financial resource strain: Not very hard    Food insecurity     Worry: Never true     Inability: Never true    Transportation needs     Medical: No     Non-medical: No    Tobacco Use    Smoking status: Never Smoker    Smokeless tobacco: Never Used   Substance and Sexual Activity    Alcohol use: Yes     Alcohol/week: 0.0 standard drinks     Frequency: Monthly or less     Drinks per session: 1 or 2     Binge frequency: Never     Comment: ocasionally    Drug use: No    Sexual activity: Yes     Partners: Male     Birth control/protection: None   Lifestyle    Physical activity     Days per week: 0 days     Minutes per session: 0 min    Stress: Only a little   Relationships    Social connections     Talks on phone: More than three times a week     Gets together: Patient refused     Attends [employer] service: More than 4 times per year     Active member of club or organization: No     Attends meetings of clubs or organizations: Never     Relationship status:    Other Topics Concern    Are you pregnant or think you may be? No    Breast-feeding No   Social History Narrative           Current Outpatient Medications:     albuterol (VENTOLIN HFA) 90 mcg/actuation inhaler, Inhale 2 puffs into the lungs every 4 (four) hours as needed for Wheezing. Rescue, Disp: 18 g, Rfl: 1    carboxymethylcellulose sodium (REFRESH TEARS) 0.5 % Drop, Apply 1-2 drops to every as needed, Disp: , Rfl:     ergocalciferol (ERGOCALCIFEROL) 50,000 unit Cap, Take 1 capsule (50,000 Units total) by mouth every 7 days., Disp: 12 capsule, Rfl: 1    multivitamin capsule, Take 1 capsule by mouth once daily. , Disp: , Rfl:     mycophenolate mofetil (CELLCEPT) 200 mg/mL SusR, Take 3.75 mLs (750 mg total) by mouth 2 (two) times daily., Disp: 480 mL, Rfl: 1    NIFEdipine (ADALAT CC) 90 MG TbSR, TAKE 1 TABLET(90 MG) BY MOUTH EVERY DAY, Disp: 90 tablet, Rfl: 3    omeprazole (PRILOSEC) 40 MG capsule, , Disp: , Rfl:     pravastatin (PRAVACHOL) 20 MG tablet, TAKE 1 TABLET BY MOUTH EVERY EVENING, Disp: 90 tablet, Rfl: 0    pregabalin (LYRICA) 100 MG capsule, Take 1 capsule (100 mg total) by mouth 2  (two) times daily., Disp: 90 capsule, Rfl: 5    PREVIDENT 5000 SENSITIVE 1.1-5 % Pste, BRUSH FOR 2 MINUTES TWICE PER DAY, Disp: , Rfl:     tadalafil (ADCIRCA) 20 mg Tab, Take 2 tablets (40 mg total) by mouth once daily., Disp: 28 tablet, Rfl: 11    conjugated estrogens (PREMARIN) vaginal cream, Place 1 g vaginally twice a week. Place pea sized amount to vagina twice per day for two weeks then twice per week therafter, Disp: 30 g, Rfl: 12    spironolactone (ALDACTONE) 25 MG tablet, TAKE 1 TABLET(25 MG) BY MOUTH EVERY DAY (Patient not taking: Reported on 8/26/2020), Disp: 30 tablet, Rfl: 11    traZODone (DESYREL) 50 MG tablet, Take 1 tablet (50 mg total) by mouth every evening. (Patient not taking: Reported on 8/26/2020), Disp: 30 tablet, Rfl: 4    REVIEW OF SYSTEMS:  General: negative; ENT: negative; Allergy and Immunology: negative; Hematological and Lymphatic: Negative; Endocrine: negative; Respiratory: no cough, shortness of breath, or wheezing; Cardiovascular: no chest pain or dyspnea on exertion; Gastrointestinal: no abdominal pain/back, change in bowel habits, or bloody stools; Genito-Urinary: no dysuria, trouble voiding, or hematuria; Musculoskeletal: Leg discomfort secondary to chronic venous insufficiency; Neurological: no TIA or stroke symptoms; Psychiatric: no nervousness, anxiety or depression.    PHYSICAL EXAM:       Vitals:    08/26/20 0930   BP: 119/62   Pulse: 84   Resp:    Temp:      Pulse: 84  Temp: 98.3 °F (36.8 °C)    General appearance:  Alert, well-appearing, and in no distress.  Oriented to person, place, and time   Neurological: Normal speech, no focal findings noted; CN II - XII grossly intact           Musculoskeletal: Digits/nail without cyanosis/clubbing.  Normal muscle strength/tone.                 Neck: Supple, no significant adenopathy; thyroid is not enlarged                  Carotid bruits cannot be auscultated                Chest:  Clear to auscultation, no wheezes, rales or  rhonchi, symmetric air entry     No use of accessory muscles             Cardiac: Normal rate and regular rhythm, S1 and S2 normal; PMI non-displaced          Abdomen: Soft, nontender, nondistended, no masses or organomegaly     No rebound tenderness noted; bowel sounds normal     Pulsatile aortic mass is not palpable.      Extremities:    2+ femoral pulses bilaterally                                                2+ pedal pulses palpable.                                               No pedal edema                                               Edema/leg swellin+ bilateral leg                                               Hyperpigmentation: bilateral leg.                                               Healed R medial; active R lateral ulcers; R dorsal ulcers    LAB RESULTS:  Lab Results   Component Value Date    K 3.8 2020    K 3.7 2020    K 3.7 2020    CREATININE 0.8 2020    CREATININE 0.8 2020    CREATININE 0.8 2020     Lab Results   Component Value Date    WBC 5.10 2020    WBC 5.10 2020    WBC 4.74 2020    HCT 40.1 2020    HCT 40.1 2020    HCT 41.6 2020     2020     2020     2020     No results found for: HGBA1C  IMAGING:    Prior:  Venous U/S:  R GSV: 5.4 mm  L GSV: 5.8 mm    R SSV: 4.2 mm  L SSV:3.9 mm  No DVT    IMP/PLAN:  69 y.o. female withh/o scleroderma (recent rx with iv veletri / prostaglandin) with venous ulcers, these began in 2015; healed in summer 2015; now Chronic venous insufficiency - CEAP C6 R lateral ulcer, C5 R medial ulcer    More ambulatory now  Plan for R GSV evlt, followed by R LSV elvt    Timmy Claudio MD [facility]  [facility]    The EVLT procedure will be done as an ambulatory procedure in the office / procedure room under sterile conditions with local anesthesia.Venous U/S:

## 2020-08-26 NOTE — TELEPHONE ENCOUNTER
Attempted to contact pt again but no answer.Left a voice message with a call back number   ----- Message from Naina Cordova sent at 8/26/2020 11:38 AM CDT -----  Regarding: Pt  Reason: Pt returning Anca call. Please call     Communication: [phone]

## 2020-09-01 DIAGNOSIS — I87.2 VENOUS INSUFFICIENCY OF BOTH LOWER EXTREMITIES: Primary | ICD-10-CM

## 2020-09-03 ENCOUNTER — PATIENT OUTREACH (OUTPATIENT)
Dept: ADMINISTRATIVE | Facility: OTHER | Age: 70
End: 2020-09-03

## 2020-09-04 ENCOUNTER — HOSPITAL ENCOUNTER (OUTPATIENT)
Dept: VASCULAR SURGERY | Facility: CLINIC | Age: 70
Discharge: HOME | End: 2020-09-04
Attending: SURGERY
Payer: MEDICARE

## 2020-09-04 DIAGNOSIS — I87.2 VENOUS INSUFFICIENCY OF BOTH LOWER EXTREMITIES: ICD-10-CM

## 2020-09-04 PROCEDURE — 93970 PR US DUPLEX, UPPER OR LOWER EXT VENOUS,COMPLETE BILAT: ICD-10-PCS | Mod: 26,S$PBB,, | Performed by: SURGERY

## 2020-09-04 PROCEDURE — 93970 EXTREMITY STUDY: CPT | Mod: 26,S$PBB,, | Performed by: SURGERY

## 2020-09-04 PROCEDURE — 93970 EXTREMITY STUDY: CPT | Mod: PBBFAC | Performed by: SURGERY

## 2020-09-04 NOTE — PROGRESS NOTES
LINKS immunization registry updated  Care Everywhere updated  Health Maintenance updated  Chart reviewed for overdue Proactive [facility] Encounters (OPAL) health maintenance testing (CRS, Breast Ca, Diabetic Eye Exam)   Orders entered:N/A

## 2020-09-08 ENCOUNTER — HOSPITAL ENCOUNTER (OUTPATIENT)
Dept: RADIOLOGY | Facility: CLINIC | Age: 70
Discharge: HOME | End: 2020-09-08
Attending: STUDENT IN AN ORGANIZED HEALTH CARE EDUCATION/TRAINING PROGRAM
Payer: MEDICARE

## 2020-09-08 ENCOUNTER — OFFICE VISIT (OUTPATIENT)
Dept: WOUND CARE | Facility: CLINIC | Age: 70
End: 2020-09-08
Payer: MEDICARE

## 2020-09-08 ENCOUNTER — TELEPHONE (OUTPATIENT)
Dept: RHEUMATOLOGY | Facility: CLINIC | Age: 70
End: 2020-09-08

## 2020-09-08 VITALS
SYSTOLIC BLOOD PRESSURE: 120 MMHG | WEIGHT: 156.06 LBS | DIASTOLIC BLOOD PRESSURE: 70 MMHG | TEMPERATURE: 97 F | BODY MASS INDEX: 25.97 KG/M2 | HEART RATE: 64 BPM | RESPIRATION RATE: 20 BRPM

## 2020-09-08 DIAGNOSIS — M34.9 SCLERODERMA: Primary | ICD-10-CM

## 2020-09-08 DIAGNOSIS — E83.51 HYPOCALCEMIA: Primary | ICD-10-CM

## 2020-09-08 DIAGNOSIS — L97.529 SKIN ULCERS OF BOTH FEET: Primary | ICD-10-CM

## 2020-09-08 DIAGNOSIS — D64.9 ANEMIA, UNSPECIFIED TYPE: ICD-10-CM

## 2020-09-08 DIAGNOSIS — Z79.899 OTHER LONG TERM (CURRENT) DRUG THERAPY: ICD-10-CM

## 2020-09-08 DIAGNOSIS — Z78.0 ASYMPTOMATIC MENOPAUSAL STATE: ICD-10-CM

## 2020-09-08 DIAGNOSIS — M85.80 OSTEOPENIA, UNSPECIFIED LOCATION: ICD-10-CM

## 2020-09-08 DIAGNOSIS — L97.519 SKIN ULCERS OF BOTH FEET: Primary | ICD-10-CM

## 2020-09-08 PROCEDURE — 99213 OFFICE O/P EST LOW 20 MIN: CPT | Mod: S$PBB,,, | Performed by: NURSE PRACTITIONER

## 2020-09-08 PROCEDURE — 77080 DXA BONE DENSITY AXIAL: CPT | Mod: TC

## 2020-09-08 PROCEDURE — 99215 OFFICE O/P EST HI 40 MIN: CPT | Mod: PBBFAC,25 | Performed by: NURSE PRACTITIONER

## 2020-09-08 PROCEDURE — 77080 DXA BONE DENSITY AXIAL: CPT | Mod: 26,,, | Performed by: INTERNAL MEDICINE

## 2020-09-08 PROCEDURE — 99213 PR OFFICE/OUTPT VISIT, EST, LEVL III, 20-29 MIN: ICD-10-PCS | Mod: S$PBB,,, | Performed by: NURSE PRACTITIONER

## 2020-09-08 PROCEDURE — 99999 PR PBB SHADOW E&M-EST. PATIENT-LVL V: CPT | Mod: PBBFAC,,, | Performed by: NURSE PRACTITIONER

## 2020-09-08 PROCEDURE — 77080 DEXA BONE DENSITY SPINE HIP: ICD-10-PCS | Mod: 26,,, | Performed by: INTERNAL MEDICINE

## 2020-09-08 PROCEDURE — 99999 PR PBB SHADOW E&M-EST. PATIENT-LVL V: ICD-10-PCS | Mod: PBBFAC,,, | Performed by: NURSE PRACTITIONER

## 2020-09-08 NOTE — PATIENT INSTRUCTIONS
Clean wounds with mild soap and water and pat dry  Hydrofera blue ready to wounds and cover with dry dressing, change every 5 days and PRN if soiled   Do not get wound dressings wet, if wet remove soiled dressings and apply new dressings  Observe for signs and symptoms of infection and report symptoms to clinic

## 2020-09-08 NOTE — PROGRESS NOTES
Subjective:       Patient ID: Yamel Shah is a 69 y.o. female.    Chief Complaint: Wound Check    HPI     69 y.o. female presents for evaluation and treatment of ulcers to right foot that have been present on and off since 2015.  She is known to this clinic, last seen 4/2018 for ulcers to her feet. She has a history of Raynaud's disease and scleroderma with progressive contraction and stiffness to her hands, R>L.  Wound care was OTC antibiotic ointment to wounds every other day and covering with dry dressing.  [facility] wounds have improved with the ointment.  States medihoney she used in the past made the wounds worse and debridement of wounds make it worse also and she does not tolerated compression. Current wound care is hydrofera blue ready and kerlix, states wound has improved since last visit. Her wound healing is complicated by scleroderma and venous insufficiency.  She has venous US 6/2019 and had f/u with vascular 8/26/2020 with bilateral lower extremity reflux on 9/4/20, results reviewed. She is scheduled for GSV EVLT of RLE in October.          Past Medical History:   Diagnosis Date    Abnormal Pap smear     Acid reflux     Allergy     Arthritis     GERD (gastroesophageal reflux disease)     History of migraine headaches     Hypertension     Idiopathic neuropathy 7/20/2012    ILD (interstitial lung disease) 11/6/2013    Iron deficiency anemia 3/18/2014    MRSA carrier     Osteopenia     Pneumonia     Pulmonary fibrosis     Pulmonary hypertension     Raynaud's disease     Scleroderma, diffuse     Sjogren's syndrome     Vitamin D deficiency 11/14/2013     Past Surgical History:   Procedure Laterality Date    24 HOUR IMPEDANCE PH MONITORING OF ESOPHAGUS IN PATIENT NOT TAKING ACID REDUCING MEDICATIONS N/A 3/4/2020    Procedure: IMPEDANCE PH STUDY, ESOPHAGEAL, 24 HOUR, IN PATIENT NOT TAKING ACID REDUCING MEDICATION;  Surgeon: Annamaria Mendoza MD;  Location[facility] ([address] [place] [address]);   Service: Endoscopy;  Laterality: N/A;  OFF PPI/H2 Blocker   Motility Studies   Hold Narcotics x 1 days   Hold TCA x 1 days  2/26 - LVM attempting to confirm appt  2/27 - Confirmed appt    BREAST BIOPSY      Left, benign    CERVICAL CONIZATION   W/ LASER  1970    COLONOSCOPY      COLONOSCOPY N/A 3/29/2019    Procedure: COLONOSCOPY;  Surgeon: Annamaria Mendoza MD;  Location: [facility] (2ND FLR);  Service: Endoscopy;  Laterality: N/A;    DILATION AND CURETTAGE OF UTERUS      ESOPHAGEAL MANOMETRY WITH MEASUREMENT OF IMPEDANCE N/A 3/4/2020    Procedure: MANOMETRY, ESOPHAGUS, WITH IMPEDANCE MEASUREMENT;  Surgeon: Annamaria Mendoza MD;  Location: [facility] (4TH FLR);  Service: Endoscopy;  Laterality: N/A;  OFF PPI/H2 Blocker   Motility Studies   Hold Narcotics x 1 days   Hold TCA x 1 days    ESOPHAGOGASTRODUODENOSCOPY      ESOPHAGOGASTRODUODENOSCOPY N/A 3/29/2019    Procedure: EGD (ESOPHAGOGASTRODUODENOSCOPY);  Surgeon: Annamaria Mendoza MD;  Location: [facility] (2ND FLR);  Service: Endoscopy;  Laterality: N/A;  pulmonary htn    HYSTERECTOMY  1990    FRANDY (AUB, Fibroids), ovaries remain          Review of Systems   Constitutional: Negative for activity change, chills, diaphoresis, fatigue and fever.   Respiratory: Negative for apnea, cough, chest tightness and shortness of breath.    Cardiovascular: Negative for chest pain, palpitations and leg swelling.   Musculoskeletal: Negative for gait problem and joint swelling.   Skin: Positive for wound. Negative for pallor and rash.   Neurological: Negative for syncope, weakness and numbness.   Psychiatric/Behavioral: Negative for agitation. The patient is not nervous/anxious.    All other systems reviewed and are negative.            Objective:      Physical Exam  Vitals signs reviewed.   Constitutional:       General: She is not in acute distress.     Appearance: She is well-developed.   HENT:      Head: Normocephalic and atraumatic.   Eyes:      Pupils: Pupils are equal,  round, and reactive to light.   Neck:      Musculoskeletal: Normal range of motion.   Cardiovascular:      Rate and Rhythm: Normal rate.   Pulmonary:      Effort: Pulmonary effort is normal. No respiratory distress.   Musculoskeletal:         General: Deformity (hands bilaterally) present.   Skin:     General: Skin is warm and dry.      Capillary Refill: Capillary refill takes less than 2 seconds.   Neurological:      Mental Status: She is alert and oriented to person, place, and time.   Psychiatric:         Judgment: Judgment normal.         Assessment:       1. Skin ulcers of right feet           Wound 08/17/20 0916 Ulceration Right lateral Leg #1 (Active)   08/17/20 0916    Pre-existing: Yes   Primary Wound Type: Ulceration   Side: Right   Orientation: lateral   Location: Leg   Wound Number (optional): #1   Ankle-Brachial Index:    Pulses:    Removal Indication and Assessment:    Wound Outcome:    (Retired) Wound Type:    (Retired) Wound Length (cm):    (Retired) Wound Width (cm):    (Retired) Depth (cm):    Wound Description (Comments):    Removal Indications:    Wound Image   09/08/20 0906   Dressing Appearance Dry;Intact 09/08/20 0906   Drainage Amount Scant 09/08/20 0906   Drainage Characteristics/Odor Clear 09/08/20 0906   Appearance Slough;Granulating 09/08/20 0906   Tissue loss description Partial thickness 09/08/20 0906   Red (%), Wound Tissue Color 75 % 09/08/20 0906   Yellow (%), Wound Tissue Color 25 % 09/08/20 0906   Periwound Area Intact 09/08/20 0906   Wound Edges Undefined 09/08/20 0906   Wound Length (cm) 1 cm 09/08/20 0906   Wound Width (cm) 1.9 cm 09/08/20 0906   Wound Depth (cm) 0.1 cm 09/08/20 0906   Wound Volume (cm^3) 0.19 cm^3 09/08/20 0906   Wound Surface Area (cm^2) 1.9 cm^2 09/08/20 0906            Wound 08/17/20 0919 Ulceration Right dorsal Foot #2 (Active)   08/17/20 0919    Pre-existing: Yes   Primary Wound Type: Ulceration   Side: Right   Orientation: dorsal   Location: Foot   Wound  Number (optional): #2   Ankle-Brachial Index:    Pulses:    Removal Indication and Assessment:    Wound Outcome:    (Retired) Wound Type:    (Retired) Wound Length (cm):    (Retired) Wound Width (cm):    (Retired) Depth (cm):    Wound Description (Comments):    Removal Indications:    Wound Image   09/08/20 0906   Dressing Appearance Dry;Intact 09/08/20 0906   Drainage Amount Scant 09/08/20 0906   Drainage Characteristics/Odor Clear 09/08/20 0906   Appearance Pink;Slough;Granulating 09/08/20 0906   Tissue loss description Partial thickness 09/08/20 0906   Red (%), Wound Tissue Color 75 % 09/08/20 0906   Yellow (%), Wound Tissue Color 25 % 09/08/20 0906   Periwound Area Intact 09/08/20 0906   Wound Edges Undefined 09/08/20 0906   Wound Length (cm) 1.4 cm 09/08/20 0906   Wound Width (cm) 0.6 cm 09/08/20 0906   Wound Depth (cm) 0.1 cm 09/08/20 0906   Wound Volume (cm^3) 0.08 cm^3 09/08/20 0906   Wound Surface Area (cm^2) 0.84 cm^2 09/08/20 0906           Plan:       Clean wounds with mild soap and water and pat dry  Hydrofera blue ready to wounds and cover with dry dressing, change every 5 days and PRN if soiled   Do not get wound dressings wet, if wet remove soiled dressings and apply new dressings  Observe for signs and symptoms of infection and report symptoms to clinic  RTC 1 month

## 2020-09-09 ENCOUNTER — LAB VISIT (OUTPATIENT)
Dept: SURGERY | Facility: CLINIC | Age: 70
End: 2020-09-09
Payer: MEDICARE

## 2020-09-09 DIAGNOSIS — Z13.9 SCREENING PROCEDURE: ICD-10-CM

## 2020-09-09 LAB — SARS-COV-2 RNA RESP QL NAA+PROBE: NOT DETECTED

## 2020-09-09 PROCEDURE — U0003 INFECTIOUS AGENT DETECTION BY NUCLEIC ACID (DNA OR RNA); SEVERE ACUTE RESPIRATORY SYNDROME CORONAVIRUS 2 (SARS-COV-2) (CORONAVIRUS DISEASE [COVID-19]), AMPLIFIED PROBE TECHNIQUE, MAKING USE OF HIGH THROUGHPUT TECHNOLOGIES AS DESCRIBED BY CMS-2020-01-R: HCPCS

## 2020-09-11 ENCOUNTER — TELEPHONE (OUTPATIENT)
Dept: RHEUMATOLOGY | Facility: CLINIC | Age: 70
End: 2020-09-11

## 2020-09-11 ENCOUNTER — HOSPITAL ENCOUNTER (OUTPATIENT)
Dept: PULMONOLOGY | Facility: CLINIC | Age: 70
Discharge: HOME | End: 2020-09-11
Payer: MEDICARE

## 2020-09-11 DIAGNOSIS — I27.20 PULMONARY HYPERTENSION: Primary | ICD-10-CM

## 2020-09-11 DIAGNOSIS — J84.9 ILD (INTERSTITIAL LUNG DISEASE): ICD-10-CM

## 2020-09-11 PROCEDURE — 94729 DIFFUSING CAPACITY: CPT | Mod: PBBFAC | Performed by: INTERNAL MEDICINE

## 2020-09-11 PROCEDURE — 94727 PR PULM FUNCTION TEST BY GAS: ICD-10-PCS | Mod: 26,S$PBB,, | Performed by: INTERNAL MEDICINE

## 2020-09-11 PROCEDURE — 94727 GAS DIL/WSHOT DETER LNG VOL: CPT | Mod: PBBFAC | Performed by: INTERNAL MEDICINE

## 2020-09-11 PROCEDURE — 94729 DIFFUSING CAPACITY: CPT | Mod: 26,S$PBB,, | Performed by: INTERNAL MEDICINE

## 2020-09-11 PROCEDURE — 94060 EVALUATION OF WHEEZING: CPT | Mod: 26,S$PBB,, | Performed by: INTERNAL MEDICINE

## 2020-09-11 PROCEDURE — 94729 PR C02/MEMBANE DIFFUSE CAPACITY: ICD-10-PCS | Mod: 26,S$PBB,, | Performed by: INTERNAL MEDICINE

## 2020-09-11 PROCEDURE — 94727 GAS DIL/WSHOT DETER LNG VOL: CPT | Mod: 26,S$PBB,, | Performed by: INTERNAL MEDICINE

## 2020-09-11 PROCEDURE — 94060 PR EVAL OF BRONCHOSPASM: ICD-10-PCS | Mod: 26,S$PBB,, | Performed by: INTERNAL MEDICINE

## 2020-09-11 PROCEDURE — 94060 EVALUATION OF WHEEZING: CPT | Mod: PBBFAC | Performed by: INTERNAL MEDICINE

## 2020-09-11 NOTE — TELEPHONE ENCOUNTER
Please tell pt she is overdue for echocardiogram to check pulmonary hypertension. Please schedule this week.  Thanks SARINA

## 2020-09-11 NOTE — TELEPHONE ENCOUNTER
yaneli Vela TTE needs f/u in Heart Transplant saw Dr. Lim in Sept 2019 and was due in 6 months. Please schedule with Dr. Martins in Heart Transplant as Dr. Lim leaving. Thanks OMAR

## 2020-09-14 ENCOUNTER — IMMUNIZATION (OUTPATIENT)
Dept: PHARMACY | Facility: CLINIC | Age: 70
End: 2020-09-14
Payer: MEDICARE

## 2020-09-14 ENCOUNTER — PATIENT OUTREACH (OUTPATIENT)
Dept: OTHER | Facility: OTHER | Age: 70
End: 2020-09-14

## 2020-09-15 NOTE — PROGRESS NOTES
Digital Medicine: Health  Follow-Up    The history is provided by the patient.             Reason for review: Blood pressure at goal        Topics Covered on Call: physical activity and Diet    Additional Follow-up details: BP essentially at goal, 131/65. Patient reports she is well, no complaints. Denies symptoms of hypertension- HA, chest pains and SOB. Denies symptoms of hypotension.     Discussed COVID-19 and importance of proper hand hygiene and social distancing.             Diet-Change    Patient reports eating or drinking the following: Reports she drinks 2 bottles of water per day. Cooking all her meals at home, cooking with less salt. Eating smaller portions of food lately.       Physical Activity-Change      Additional physical activity details:  recently had surgery, has been very busy going to appointments with him. Staying inside usually.       Medication Adherence-Medication adherence was assessed.        Substance, Sleep, Stress-change  stress-assessed  Details:COVID, but doing well   Intervention(s):    Sleep-assessed  Details:sleeping well   Intervention(s):    Alcohol -  Details:  Intervention(s):    Tobacco-  Details:  Intervention(s):          Continue current diet/physical activity routine.  Provided patient education.       Addressed patient questions and patient has my contact information if needed prior to next outreach. Patient verbalizes understanding.      Explained the importance of self-monitoring and medication adherence. Encouraged the patient to communicate with their health  for lifestyle modifications to help improve or maintain a healthy lifestyle.            There are no preventive care reminders to display for this patient.    Last 5 Patient Entered Readings                                      Current 30 Day Average: 131/65     Recent Readings 9/12/2020 9/7/2020 9/3/2020 8/31/2020 8/31/2020    SBP (mmHg) 132 119 127 134 145    DBP (mmHg) 65 56 59 67 66    Pulse  66 73 68 65 67

## 2020-09-18 ENCOUNTER — HOSPITAL ENCOUNTER (OUTPATIENT)
Dept: CARDIOLOGY | Facility: HOSPITAL | Age: 70
Discharge: HOME | End: 2020-09-18
Attending: INTERNAL MEDICINE
Payer: MEDICARE

## 2020-09-18 ENCOUNTER — TELEPHONE (OUTPATIENT)
Dept: RHEUMATOLOGY | Facility: CLINIC | Age: 70
End: 2020-09-18

## 2020-09-18 VITALS
HEART RATE: 78 BPM | DIASTOLIC BLOOD PRESSURE: 58 MMHG | SYSTOLIC BLOOD PRESSURE: 130 MMHG | WEIGHT: 156 LBS | HEIGHT: 65 IN | BODY MASS INDEX: 25.99 KG/M2

## 2020-09-18 DIAGNOSIS — I27.20 PULMONARY HYPERTENSION: ICD-10-CM

## 2020-09-18 LAB
ASCENDING AORTA: 2.83 CM
AV INDEX (PROSTH): 0.68
AV MEAN GRADIENT: 5 MMHG
AV PEAK GRADIENT: 9 MMHG
AV VALVE AREA: 2.21 CM2
AV VELOCITY RATIO: 0.57
BSA FOR ECHO PROCEDURE: 1.8 M2
CV ECHO LV RWT: 0.34 CM
DOP CALC AO PEAK VEL: 1.5 M/S
DOP CALC AO VTI: 31.5 CM
DOP CALC LVOT AREA: 3.2 CM2
DOP CALC LVOT DIAMETER: 2.03 CM
DOP CALC LVOT PEAK VEL: 0.85 M/S
DOP CALC LVOT STROKE VOLUME: 69.62 CM3
DOP CALCLVOT PEAK VEL VTI: 21.52 CM
E WAVE DECELERATION TIME: 199.64 MSEC
E/A RATIO: 1.19
E/E' RATIO: 10 M/S
ECHO LV POSTERIOR WALL: 0.88 CM (ref 0.6–1.1)
FRACTIONAL SHORTENING: 31 % (ref 28–44)
INTERVENTRICULAR SEPTUM: 0.76 CM (ref 0.6–1.1)
IVRT: 85.63 MSEC
LA MAJOR: 6.04 CM
LA MINOR: 6.1 CM
LA WIDTH: 4.65 CM
LEFT ATRIUM SIZE: 3.54 CM
LEFT ATRIUM VOLUME INDEX: 47.7 ML/M2
LEFT ATRIUM VOLUME: 84.93 CM3
LEFT INTERNAL DIMENSION IN SYSTOLE: 3.57 CM (ref 2.1–4)
LEFT VENTRICLE DIASTOLIC VOLUME INDEX: 71.7 ML/M2
LEFT VENTRICLE DIASTOLIC VOLUME: 127.62 ML
LEFT VENTRICLE MASS INDEX: 83 G/M2
LEFT VENTRICLE SYSTOLIC VOLUME INDEX: 30 ML/M2
LEFT VENTRICLE SYSTOLIC VOLUME: 53.43 ML
LEFT VENTRICULAR INTERNAL DIMENSION IN DIASTOLE: 5.17 CM (ref 3.5–6)
LEFT VENTRICULAR MASS: 148.39 G
LV LATERAL E/E' RATIO: 9.5 M/S
LV SEPTAL E/E' RATIO: 10.56 M/S
MV PEAK A VEL: 0.8 M/S
MV PEAK E VEL: 0.95 M/S
MV STENOSIS PRESSURE HALF TIME: 57.9 MS
MV VALVE AREA P 1/2 METHOD: 3.8 CM2
PISA TR MAX VEL: 2.83 M/S
PULM VEIN S/D RATIO: 1.28
PV PEAK D VEL: 0.46 M/S
PV PEAK S VEL: 0.59 M/S
RA MAJOR: 5.34 CM
RA PRESSURE: 8 MMHG
RA WIDTH: 4.36 CM
RIGHT VENTRICULAR END-DIASTOLIC DIMENSION: 3.85 CM
RV TISSUE DOPPLER FREE WALL SYSTOLIC VELOCITY 1 (APICAL 4 CHAMBER VIEW): 11.71 CM/S
SINUS: 2.99 CM
STJ: 2.66 CM
TDI LATERAL: 0.1 M/S
TDI SEPTAL: 0.09 M/S
TDI: 0.1 M/S
TR MAX PG: 32 MMHG
TRICUSPID ANNULAR PLANE SYSTOLIC EXCURSION: 2.49 CM
TV REST PULMONARY ARTERY PRESSURE: 40 MMHG

## 2020-09-18 PROCEDURE — 93306 ECHO (CUPID ONLY): ICD-10-PCS | Mod: 26,,, | Performed by: INTERNAL MEDICINE

## 2020-09-18 PROCEDURE — 93306 TTE W/DOPPLER COMPLETE: CPT

## 2020-09-18 PROCEDURE — 93306 TTE W/DOPPLER COMPLETE: CPT | Mod: 26,,, | Performed by: INTERNAL MEDICINE

## 2020-09-18 NOTE — TELEPHONE ENCOUNTER
Former pt of Dr. Lim, overdue for f/u in Heart Transplant, please schedule with Dr.Sapna Martins. Thank you.SARINA

## 2020-09-19 ENCOUNTER — TELEPHONE (OUTPATIENT)
Dept: PHARMACY | Facility: CLINIC | Age: 70
End: 2020-09-19

## 2020-09-21 ENCOUNTER — PATIENT MESSAGE (OUTPATIENT)
Dept: TRANSPLANT | Facility: CLINIC | Age: 70
End: 2020-09-21

## 2020-09-22 ENCOUNTER — PATIENT MESSAGE (OUTPATIENT)
Dept: RESEARCH | Facility: OTHER | Age: 70
End: 2020-09-22

## 2020-09-22 ENCOUNTER — CLINICAL SUPPORT (OUTPATIENT)
Dept: REHABILITATION | Facility: HOSPITAL | Age: 70
End: 2020-09-22
Payer: MEDICARE

## 2020-09-22 DIAGNOSIS — M25.60 STIFFNESS IN JOINT: ICD-10-CM

## 2020-09-22 PROCEDURE — 97763 ORTHC/PROSTC MGMT SBSQ ENC: CPT

## 2020-09-22 NOTE — PROGRESS NOTES
"OCCUPATIONAL THERAPY --- ORTHOTIC EVALUATION - FITTING - TRAINING     Patient: Yamel Shah  Date of Evaluation: 9/22/2020  MRN: [id number]  Diagnosis:   Encounter Diagnosis   Name Primary?    Stiffness in joint      Physician: Ruben Phelps MD    SUBJECTIVE:   " I have been having this since 1980 (reffering to contractures)   Patient referred for re-fabrication of left hand orthosis due to being uncomfortable and patient being unable to doff/chanel IND'ly without difficulty.     Pain: 0 /10    OBJECTIVE:     Observations: contracture of left hand     Range of Motion (in degrees): contracture     Strength (in pounds): Deferred       Circumferential Edema Measurements (in cm): Deferred       ADLs/Function:      ASSESSMENT:    Fabricated custom orthotic resting hand based static volar mitt orthosis (HFO - billed [id number] x 2 (30 min) per MD orders with [employer]. Padded with foam for comfort and support  Instructed in orthotic use, wear, care and precautions in detail w/ patient.    Supplied extra straps and padding.    Rehab Potential: good      PLAN:    Discharge with static resting hand based orthosis  for night use to prevent further contractures      ES Perez, CHT  Occupational therapist, Certified Hand Therapist      "

## 2020-09-29 ENCOUNTER — TELEPHONE (OUTPATIENT)
Dept: RHEUMATOLOGY | Facility: CLINIC | Age: 70
End: 2020-09-29

## 2020-10-01 NOTE — TELEPHONE ENCOUNTER
Confirmed [employer] shipment 10/1 to arrive to patient home 10/2. Patient reported enough doses on hand to last until Monday and about 4 missed doses in the past month. Reviewed use of phone reminders/alarm. Patient stated she would try that and would also make taking her medication as soon as she gets up a routine like making her bed. Emphasized the importance of compliance. Patient voiced understanding. Address and  verified. $15 copay (004).

## 2020-10-02 ENCOUNTER — OFFICE VISIT (OUTPATIENT)
Dept: PAIN MEDICINE | Facility: CLINIC | Age: 70
End: 2020-10-02
Payer: MEDICARE

## 2020-10-02 VITALS
RESPIRATION RATE: 18 BRPM | HEART RATE: 63 BPM | OXYGEN SATURATION: 100 % | WEIGHT: 154.31 LBS | BODY MASS INDEX: 25.71 KG/M2 | TEMPERATURE: 98 F | DIASTOLIC BLOOD PRESSURE: 62 MMHG | SYSTOLIC BLOOD PRESSURE: 134 MMHG | HEIGHT: 65 IN

## 2020-10-02 DIAGNOSIS — M34.89 CUTANEOUS SCLERODERMA: ICD-10-CM

## 2020-10-02 DIAGNOSIS — M79.18 MYOFASCIAL PAIN: ICD-10-CM

## 2020-10-02 DIAGNOSIS — M50.30 DDD (DEGENERATIVE DISC DISEASE), CERVICAL: ICD-10-CM

## 2020-10-02 DIAGNOSIS — G89.4 CHRONIC PAIN DISORDER: Primary | ICD-10-CM

## 2020-10-02 DIAGNOSIS — G60.9 IDIOPATHIC NEUROPATHY: ICD-10-CM

## 2020-10-02 DIAGNOSIS — M47.812 CERVICAL SPONDYLOSIS: ICD-10-CM

## 2020-10-02 PROCEDURE — 99213 OFFICE O/P EST LOW 20 MIN: CPT | Mod: S$PBB,,, | Performed by: NURSE PRACTITIONER

## 2020-10-02 PROCEDURE — 99999 PR PBB SHADOW E&M-EST. PATIENT-LVL V: CPT | Mod: PBBFAC,,, | Performed by: NURSE PRACTITIONER

## 2020-10-02 PROCEDURE — 99999 PR PBB SHADOW E&M-EST. PATIENT-LVL V: ICD-10-PCS | Mod: PBBFAC,,, | Performed by: NURSE PRACTITIONER

## 2020-10-02 PROCEDURE — 99215 OFFICE O/P EST HI 40 MIN: CPT | Mod: PBBFAC | Performed by: NURSE PRACTITIONER

## 2020-10-02 PROCEDURE — 99213 PR OFFICE/OUTPT VISIT, EST, LEVL III, 20-29 MIN: ICD-10-PCS | Mod: S$PBB,,, | Performed by: NURSE PRACTITIONER

## 2020-10-02 RX ORDER — PREGABALIN 100 MG/1
100 CAPSULE ORAL 2 TIMES DAILY
Qty: 60 CAPSULE | Refills: 5 | Status: SHIPPED | OUTPATIENT
Start: 2020-10-02 | End: 2020-11-13

## 2020-10-02 RX ORDER — TIZANIDINE 4 MG/1
4 TABLET ORAL NIGHTLY PRN
Qty: 30 TABLET | Refills: 1 | Status: SHIPPED | OUTPATIENT
Start: 2020-10-02 | End: 2020-11-01

## 2020-10-02 NOTE — PROGRESS NOTES
Chronic Pain - Follow Up    Referring Physician: No ref. provider found    Chief Complaint:   Chief Complaint   Patient presents with    Foot Pain        SUBJECTIVE: Disclaimer: This note has been generated using voice-recognition software. There may be typographical errors that have been missed during proof-reading    Interval History 10/2/2020:  The patient returns to clinic today for follow up of foot pain. She reports increased bilateral foot pain over the last few months. This pain is burning in nature. This pain is worse at night. She continues to have ulcers to her feet related to her scleroderma. She would like to restart the Lyrica. She also reports increased neck pain that is sore and aching in nature. She denies any radiating arm pain. This is worse with turning her head and at night. She denies any other health changes. Her pain today is 7/10.    Interval History 6/5/2020:  The patient requests audio visit today for follow up of bilateral foot pain. She reports intermittent foot pain that is tolerable at this time. She has discontinued Lyrica as of April. She continues to have ulcers to her feet. She does have wound care. She denies any other health changes.     Interval History 1/17/2020:  The patient returns to clinic today for follow up. She continues to report bilateral foot pain. She describes this pain as burning and tingling in nature. At last visit, we decreased her Lyrica dose to 100 mg at night. She reports increased pain since then. She would like to go to back to the 150 mg dose. She continues to have ulcers to her feet, left worse than right. She continues to participate in a home wound care program. She denies any other health changes. Her pain today is 6/10.    Interval History 12/17/2019:  The patient returns to clinic today for follow up. She reports improving foot pain. She reports improved healing ulcers to her left foot. She continues to report right foot pain that is burning and  tingling in nature. She continues to participate in a home wound care routine. She continues to take Lyrica. She asks about decreasing this. She denies any other health changes. Her pain today is 5/10.    Interval History 9/17/2019:  The patient returns to clinic today for follow up. She continues to report bilateral foot pain that is burning and tingling in nature. Her pain is worse with sitting and at night. She continues to have slow healing ulcers to both feet. She continues to perform a home wound care program. She is no longer taking Gabapentin which was previously called in. She is now taking Pregabalin generic with benefit. She denies any other health changes. Her pain today is 4/10.    Interval History 6/13/2019:  The patient returns to clinic for follow up for bilateral foot pain. She continues to report bilateral foot pain that is burning in nature. She continues to have slow healing wounds to both feet from ulcers. She continues to take Lyrica once daily but reports increased pain. She continues to take Vitamin B12. She denies any other health changes. Her pain today is 8/10.    Interval History 3/13/2019:  The patient returns to clinic today for follow up. She continues to report bilateral foot pain that is burning and tingling in nature. Her pain is worse with prolonged walking and standing. She continues to have bilateral foot wounds. She reports that these wounds have significantly improved since last visit. She is currently taking Vitamin B12 with benefit. She continues to report benefit with Lyrica. She is currently taking this once daily. She denies any other health changes. Her pain today is 5/10.    Interval History 2/6/2019:  The patient returns to clinic today for follow up. She reports that her bilateral foot wounds have significantly improved since last visit. She does report some significant improvement in her foot pain. She reports intermittent bilateral foot pain especially with prolonged  walking. She describes this pain as heavy and tingling in nature. She is no longer taking Celebrex. She is currently taking Lyrica 150 mg BID with benefit. She does report that the Lyrica is expensive for her. She denies any other health changes. Her pain today is 5/10.    Interval History 12/5/18:  Patient returns to clinic today for follow up. She reports that since increasing her medications, she is having significant improvement in pain during the day time. She is currently taking Lyrica 75mg TID and Celebrex 200mg TID. However, she states that the burning  And tingling pain in both her feet increase in the evening around 6 or 7pm. She is unable to sleep during the nights due to the pain. She is still wearing her bilateral boots due to non healing ulcers from her scleroderma.     Interval History 8/30/2018:  The patient returns to clinic today for follow up. She continues to report bilateral foot pain that is burning and tingling in nature. She reports that this pain is worse at night. She is currently taking Lyrica 75 mg BID with limited relief. She did not have any benefit with Mobic. She continues to take Aleve with some benefit. She continues to have nonhealing ulcers. She wears an ACE bandage to her right calf, as well as boots to her bilateral feet. She denies any other health changes. Her pain today is 7/10.     Interval History 7/11/2018:  Since previous encounter she has weaned from gabapentin to 600mg / day and did not notice significant worsening of pain, but was having somnelence from higher doses of the medication in the past.  We are weaning in an attempt to trial Lyrica as an alternative to gabapentin.  Tramadol causes significant sedation, limiting its use.  She has discontinued the use of the tramadol.  Additionally she does have benefit from anti-inflammatory medication, has been using aleve, has not trialed CANTRELL-2 inhibitors in the past.    Interval history 05/16/2018:      The patient has had  x-rays of the lumbar spine which did not reveal any evidence of significant neuroforaminal stenosis with degenerative disc disease.  There is mild facet arthropathy.  She has escalated her gabapentin and is currently taking 2100 mg of gabapentin per day without any noticeable improvement but daytime somnolence.  She continues to have nonhealing ulcers and topical pain cream is helping to a limited degree over her leg in areas where there is no skin disruption.    Initial encounter:    Yamel Shah presents to the clinic for the evaluation of foot pain. The pain started 2 years ago following ulcers and symptoms have been worsening.    Brief history: History of scleroderma    Pain Description:    The pain is located in the both feet in the area of slowly healing chronic foot ulcers which were partly from venous insufficiency and stasis associated with her scleroderma.  In her right lower extremity she describes radicular symptoms in the L4/5 distribution.    At BEST  5/10     At WORST  10/10 on the WORST day.      On average pain is rated as 8/10.     Today the pain is rated as 8/10    The pain is described as burning, sharp, shooting and constant      Symptoms interfere with daily activity, sleeping and work.     Exacerbating factors: Laying, Walking, Night Time, Morning and Getting out of bed/chair.      Mitigating factors medications.     Patient denies night fever/night sweats, urinary incontinence, bowel incontinence, significant weight loss, significant motor weakness and loss of sensations.  Patient denies any suicidal or homicidal ideations    Pain Medications:  Current:  Lyrica 150 mg daily    Tried in Past:  NSAIDs -Aleve, Mobic, Celebrex  TCA -Never  SNRI -Never  Anti-convulsants - Gabapentin, Lyrica  Muscle Relaxants -Never  Opioids-Tramadol    Physical Therapy/Home Exercise: no       report:  Reviewed and consistent with medication use as prescribed.    Pain Procedures: none    Chiropractor  -never  Acupuncture - never  TENS unit -never  Spinal decompression -never  Joint replacement -never    Imaging:   Xray Cervical Spine 12/6/2019:  FINDINGS:  Odontoid prevertebral soft tissues and posterior elements are intact.  Neural foramina are patent.  No fracture dislocation bone destruction seen.  There is mild DJD.     Impression:     Mild DJD.    X-ray lumbar spine 6/1/2016:  2 views: Alignment is normal. There is mild DJD. No fracture dislocation bone destruction seen.   Impression      Mild DJD.     Xray lumbar spine 4/2018:  COMPARISON:  June 2016    FINDINGS:  There is slight curvature of the lumbar spine.  The vertebral bodies are normally aligned and normal in height.  Mild disc space narrowing present at L5-S1.  There is mild facet degenerative change in the lower spine.  No significant osteophytic spurring present.  There is no change in alignment with flexion or extension.      Past Medical History:   Diagnosis Date    Abnormal Pap smear     Acid reflux     Allergy     Arthritis     GERD (gastroesophageal reflux disease)     History of migraine headaches     Hypertension     Idiopathic neuropathy 7/20/2012    ILD (interstitial lung disease) 11/6/2013    Iron deficiency anemia 3/18/2014    MRSA carrier     Osteopenia     Pneumonia     Pulmonary fibrosis     Pulmonary hypertension     Raynaud's disease     Scleroderma, diffuse     Sjogren's syndrome     Vitamin D deficiency 11/14/2013     Past Surgical History:   Procedure Laterality Date    24 HOUR IMPEDANCE PH MONITORING OF ESOPHAGUS IN PATIENT NOT TAKING ACID REDUCING MEDICATIONS N/A 3/4/2020    Procedure: IMPEDANCE PH STUDY, ESOPHAGEAL, 24 HOUR, IN PATIENT NOT TAKING ACID REDUCING MEDICATION;  Surgeon: Annamaria Mendoza MD;  Location: [facility] ([address] [place] [address]);  Service: Endoscopy;  Laterality: N/A;  OFF PPI/H2 Blocker   Motility Studies   Hold Narcotics x 1 days   Hold TCA x 1 days  2/26 - LVM attempting to confirm appt  2/27 -  Confirmed appt    BREAST BIOPSY      Left, benign    CERVICAL CONIZATION   W/ LASER  1970    COLONOSCOPY      COLONOSCOPY N/A 3/29/2019    Procedure: COLONOSCOPY;  Surgeon: Annamaria Mendoza MD;  Location: [facility] (2ND FLR);  Service: Endoscopy;  Laterality: N/A;    DILATION AND CURETTAGE OF UTERUS      ESOPHAGEAL MANOMETRY WITH MEASUREMENT OF IMPEDANCE N/A 3/4/2020    Procedure: MANOMETRY, ESOPHAGUS, WITH IMPEDANCE MEASUREMENT;  Surgeon: Annamaria Mendoza MD;  Location: [facility] (4TH FLR);  Service: Endoscopy;  Laterality: N/A;  OFF PPI/H2 Blocker   Motility Studies   Hold Narcotics x 1 days   Hold TCA x 1 days    ESOPHAGOGASTRODUODENOSCOPY      ESOPHAGOGASTRODUODENOSCOPY N/A 3/29/2019    Procedure: EGD (ESOPHAGOGASTRODUODENOSCOPY);  Surgeon: Annamaria Mendoza MD;  Location: [facility] (2ND FLR);  Service: Endoscopy;  Laterality: N/A;  pulmonary htn    HYSTERECTOMY  1990    FRANDY (AUB, Fibroids), ovaries remain     Social History     Socioeconomic History    Marital status:      Spouse name: Lewis    Number of children: 4    Years of education: Not on file    Highest education level: Not on file   Occupational History    Occupation: -retired     Employer: [employer]     Comment: [employer]     Employer: RETIRED   Social Needs    Financial resource strain: Not very hard    Food insecurity     Worry: Never true     Inability: Never true    Transportation needs     Medical: No     Non-medical: No   Tobacco Use    Smoking status: Never Smoker    Smokeless tobacco: Never Used   Substance and Sexual Activity    Alcohol use: Yes     Alcohol/week: 0.0 standard drinks     Frequency: Monthly or less     Drinks per session: 1 or 2     Binge frequency: Never     Comment: ocasionally    Drug use: No    Sexual activity: Yes     Partners: Male     Birth control/protection: None   Lifestyle    Physical activity     Days per week: 0 days     Minutes per session: 0 min    Stress: Only  a little   Relationships    Social connections     Talks on phone: More than three times a week     Gets together: Patient refused     Attends [employer] service: More than 4 times per year     Active member of club or organization: No     Attends meetings of clubs or organizations: Never     Relationship status:    Other Topics Concern    Are you pregnant or think you may be? No    Breast-feeding No   Social History Narrative         Family History   Problem Relation Age of Onset    Breast cancer Mother     No Known Problems Daughter     No Known Problems Son     No Known Problems Daughter     No Known Problems Son     Breast cancer Sister     Breast cancer Maternal Aunt     Osteoarthritis Brother     Melanoma Neg Hx     Colon cancer Neg Hx     Crohn's disease Neg Hx     Stomach cancer Neg Hx     Ulcerative colitis Neg Hx     Rectal cancer Neg Hx     Irritable bowel syndrome Neg Hx     Esophageal cancer Neg Hx     Celiac disease Neg Hx     Ovarian cancer Neg Hx        Review of patient's allergies indicates:   Allergen Reactions    Sulfa (sulfonamide antibiotics)      Other reaction(s): Rash       Current Outpatient Medications   Medication Sig    albuterol (VENTOLIN HFA) 90 mcg/actuation inhaler Inhale 2 puffs into the lungs every 4 (four) hours as needed for Wheezing. Rescue    carboxymethylcellulose sodium (REFRESH TEARS) 0.5 % Drop Apply 1-2 drops to every as needed    ergocalciferol (ERGOCALCIFEROL) 50,000 unit Cap Take 1 capsule (50,000 Units total) by mouth every 7 days.    multivitamin capsule Take 1 capsule by mouth once daily.     mycophenolate mofetil (CELLCEPT) 200 mg/mL SusR Take 3.75 mLs (750 mg total) by mouth 2 (two) times daily.    NIFEdipine (ADALAT CC) 90 MG TbSR TAKE 1 TABLET(90 MG) BY MOUTH EVERY DAY    omeprazole (PRILOSEC) 40 MG capsule     pravastatin (PRAVACHOL) 20 MG tablet TAKE 1 TABLET BY MOUTH EVERY EVENING    pregabalin (LYRICA) 100 MG  capsule Take 1 capsule (100 mg total) by mouth 2 (two) times daily.    PREVIDENT 5000 SENSITIVE 1.1-5 % Pste BRUSH FOR 2 MINUTES TWICE PER DAY    spironolactone (ALDACTONE) 25 MG tablet TAKE 1 TABLET(25 MG) BY MOUTH EVERY DAY    tadalafil (ADCIRCA) 20 mg Tab Take 2 tablets (40 mg total) by mouth once daily.    traZODone (DESYREL) 50 MG tablet Take 1 tablet (50 mg total) by mouth every evening.    ALPRAZolam (XANAX) 0.5 MG tablet Take 1 tablet (0.5 mg total) by mouth once as needed for Anxiety. Take one 0.5 mg tablet of xanax 1h before the EVLT procedure.  You may take a second tablet right before the procedure; please check with our nurse.    conjugated estrogens (PREMARIN) vaginal cream Place 1 g vaginally twice a week. Place pea sized amount to vagina twice per day for two weeks then twice per week therafter     No current facility-administered medications for this visit.        REVIEW OF SYSTEMS:    GENERAL:  No weight loss, malaise or fevers.  HEENT:   No recent changes in vision or hearing  NECK:  Negative for lumps,  difficulty with swallowing associated with scleroderma.  RESPIRATORY:  Negative for cough, wheezing or shortness of breath, patient denies any recent URI. Albuterol (history of ILD)  CARDIOVASCULAR:  Negative for chest pain, leg swelling or palpitations.  GI:  Negative for abdominal discomfort, blood in stools or black stools or change in bowel habits. GERD controlled zantac  MUSCULOSKELETAL:  See HPI.  SKIN:   Chronic low healing venous stasis ulcerations to bilateral lower extremities   PSYCH:  No mood disorder or recent psychosocial stressors.  Patients sleep is not disturbed secondary to pain.  HEMATOLOGY/LYMPHOLOGY:   History of iron deficiency anemia, takes daily iron supplementation.    ENDO: No history of diabetes or thyroid dysfunction  NEURO:   No history of headaches, syncope, paralysis, seizures or tremors.  All other reviewed and negative other than HPI.    OBJECTIVE:      BP  "134/62   Pulse 63   Temp 97.6 °F (36.4 °C)   Resp 18   Ht 5' 5" (1.651 m)   Wt 70 kg (154 lb 5.2 oz)   SpO2 100%   BMI 25.68 kg/m²     PHYSICAL EXAMINATION:    GENERAL: Well appearing, in no acute distress, alert and oriented x3.  PSYCH:  Mood and affect appropriate.  SKIN: Tight skin associated with scleroderma.   HEAD/FACE:  Normocephalic, atraumatic. Cranial nerves grossly intact.  NECK: There is pain with palpation over cervical paraspinals and trapezius muscle on the right. There is pain with palpation over cervical facet joints on the right. Limited ROM with pain on flexion, extension, and lateral rotation on the right.   CV: RRR with palpation of the radial artery.  PULM: No evidence of respiratory difficulty, symmetric chest rise.  BACK:  There is no pain with palpation over the facet joints of the lumbar spine bilaterally. Limited ROM with mild pain on extension. Positive facet loading bilaterally.    EXTREMITIES: Contractures over on bilateral hands with ulcerations to knuckles, right greater than left. Dressings noted to bilateral feet.   MUSCULOSKELETAL:  There is no pain to palpation over the greater trochanteric bursa bilaterally.  FABERs test is positive bilaterally.  FADIRs test is negative.   Bilateral lower extremity strength testing limited secondary to pain in the feet.    NEURO: Bilateral lower extremity coordination and muscle stretch reflexes are physiologic and symmetric. Decreased sensation to BLE. Plantar response are downgoing. No clonus.  No loss of sensation is noted.  GAIT: Antalgic, ambulates without assistance         Lab Results   Component Value Date    WBC 3.62 (L) 09/08/2020    HGB 11.7 (L) 09/08/2020    HCT 38.0 09/08/2020    MCV 93 09/08/2020     09/08/2020     BMP  Lab Results   Component Value Date     09/08/2020    K 3.7 09/08/2020     09/08/2020    CO2 23 09/08/2020    BUN 12 09/08/2020    CREATININE 0.7 09/08/2020    CALCIUM 8.5 (L) 09/08/2020    " ANIONGAP 7 (L) 09/08/2020    ESTGFRAFRICA >60.0 09/08/2020    EGFRNONAA >60.0 09/08/2020         ASSESSMENT: 69 y.o. year old female with pain, consistent with     Encounter Diagnoses   Name Primary?    Chronic pain disorder Yes    Cutaneous scleroderma     Idiopathic neuropathy     Cervical spondylosis     DDD (degenerative disc disease), cervical     Myofascial pain        PLAN:     - Previous imaging was reviewed and discussed with the patient today.    - Consider cervical facet joint injections in the future.     - Consult physical therapy.     - Lyrica 100 mg BID.     - Trial Zanaflex 4 mg at bedtime.     - Continue to follow up with wound care.     - RTC in 6 weeks or sooner if needed.     - Dr. King was consulted on the patient and agrees with this plan.    The above plan and management options were discussed at length with patient. Patient is in agreement with the above and verbalized understanding.     Viktoriya Camara NP  10/02/2020

## 2020-10-06 ENCOUNTER — OFFICE VISIT (OUTPATIENT)
Dept: WOUND CARE | Facility: CLINIC | Age: 70
End: 2020-10-06
Payer: MEDICARE

## 2020-10-06 VITALS
TEMPERATURE: 98 F | BODY MASS INDEX: 26.3 KG/M2 | DIASTOLIC BLOOD PRESSURE: 63 MMHG | HEIGHT: 65 IN | HEART RATE: 90 BPM | WEIGHT: 157.88 LBS | SYSTOLIC BLOOD PRESSURE: 125 MMHG

## 2020-10-06 DIAGNOSIS — L97.519 SKIN ULCERS OF BOTH FEET: Primary | ICD-10-CM

## 2020-10-06 DIAGNOSIS — L97.529 SKIN ULCERS OF BOTH FEET: Primary | ICD-10-CM

## 2020-10-06 PROCEDURE — 99999 PR PBB SHADOW E&M-EST. PATIENT-LVL V: ICD-10-PCS | Mod: PBBFAC,,, | Performed by: NURSE PRACTITIONER

## 2020-10-06 PROCEDURE — 99215 OFFICE O/P EST HI 40 MIN: CPT | Mod: PBBFAC | Performed by: NURSE PRACTITIONER

## 2020-10-06 PROCEDURE — 99999 PR PBB SHADOW E&M-EST. PATIENT-LVL V: CPT | Mod: PBBFAC,,, | Performed by: NURSE PRACTITIONER

## 2020-10-06 PROCEDURE — 99213 OFFICE O/P EST LOW 20 MIN: CPT | Mod: S$PBB,,, | Performed by: NURSE PRACTITIONER

## 2020-10-06 PROCEDURE — 99213 PR OFFICE/OUTPT VISIT, EST, LEVL III, 20-29 MIN: ICD-10-PCS | Mod: S$PBB,,, | Performed by: NURSE PRACTITIONER

## 2020-10-06 RX ORDER — CEPHALEXIN 500 MG/1
CAPSULE ORAL
COMMUNITY
Start: 2020-09-23 | End: 2020-11-24 | Stop reason: SDUPTHER

## 2020-10-06 NOTE — PROGRESS NOTES
Subjective:       Patient ID: Yamel Shah is a 69 y.o. female.    Chief Complaint: Wound Check    HPI     69 y.o. female presents for evaluation and treatment of ulcers to right foot that have been present on and off since 2015.  She is known to this clinic, last seen 4/2018 for ulcers to her feet. She has a history of Raynaud's disease and scleroderma with progressive contraction and stiffness to her hands, R>L.  Wound care was OTC antibiotic ointment to wounds every other day and covering with dry dressing.  [facility] wounds have improved with the ointment.  States medihoney she used in the past made the wounds worse and debridement of wounds make it worse also and she does not tolerated compression. Current wound care is hydrofera blue ready and kerlix, states wound has improved since last visit. Her wound healing is complicated by scleroderma and venous insufficiency.  She has venous US 6/2019 and had f/u with vascular 8/26/2020 with bilateral lower extremity reflux on 9/4/20, results reviewed. She is scheduled for GSV EVLT of RLE in October.          Past Medical History:   Diagnosis Date    Abnormal Pap smear     Acid reflux     Allergy     Arthritis     GERD (gastroesophageal reflux disease)     History of migraine headaches     Hypertension     Idiopathic neuropathy 7/20/2012    ILD (interstitial lung disease) 11/6/2013    Iron deficiency anemia 3/18/2014    MRSA carrier     Osteopenia     Pneumonia     Pulmonary fibrosis     Pulmonary hypertension     Raynaud's disease     Scleroderma, diffuse     Sjogren's syndrome     Vitamin D deficiency 11/14/2013     Past Surgical History:   Procedure Laterality Date    24 HOUR IMPEDANCE PH MONITORING OF ESOPHAGUS IN PATIENT NOT TAKING ACID REDUCING MEDICATIONS N/A 3/4/2020    Procedure: IMPEDANCE PH STUDY, ESOPHAGEAL, 24 HOUR, IN PATIENT NOT TAKING ACID REDUCING MEDICATION;  Surgeon: Annamaria Mendoza MD;  Location: [facility] ([address] [place] [address]);   Service: Endoscopy;  Laterality: N/A;  OFF PPI/H2 Blocker   Motility Studies   Hold Narcotics x 1 days   Hold TCA x 1 days  2/26 - LVM attempting to confirm appt  2/27 - Confirmed appt    BREAST BIOPSY      Left, benign    CERVICAL CONIZATION   W/ LASER  1970    COLONOSCOPY      COLONOSCOPY N/A 3/29/2019    Procedure: COLONOSCOPY;  Surgeon: Annamaria Mendoza MD;  Location: [facility] (2ND FLR);  Service: Endoscopy;  Laterality: N/A;    DILATION AND CURETTAGE OF UTERUS      ESOPHAGEAL MANOMETRY WITH MEASUREMENT OF IMPEDANCE N/A 3/4/2020    Procedure: MANOMETRY, ESOPHAGUS, WITH IMPEDANCE MEASUREMENT;  Surgeon: Annamaria Mendoza MD;  Location: [facility] (4TH FLR);  Service: Endoscopy;  Laterality: N/A;  OFF PPI/H2 Blocker   Motility Studies   Hold Narcotics x 1 days   Hold TCA x 1 days    ESOPHAGOGASTRODUODENOSCOPY      ESOPHAGOGASTRODUODENOSCOPY N/A 3/29/2019    Procedure: EGD (ESOPHAGOGASTRODUODENOSCOPY);  Surgeon: Annamaria Mendoza MD;  Location: [facility] (2ND FLR);  Service: Endoscopy;  Laterality: N/A;  pulmonary htn    HYSTERECTOMY  1990    FRANDY (AUB, Fibroids), ovaries remain          Review of Systems   Constitutional: Negative for activity change, chills, diaphoresis, fatigue and fever.   Respiratory: Negative for apnea, cough, chest tightness and shortness of breath.    Cardiovascular: Negative for chest pain, palpitations and leg swelling.   Musculoskeletal: Negative for gait problem and joint swelling.   Skin: Positive for wound. Negative for pallor and rash.   Neurological: Negative for syncope, weakness and numbness.   Psychiatric/Behavioral: Negative for agitation. The patient is not nervous/anxious.    All other systems reviewed and are negative.            Objective:      Physical Exam  Vitals signs reviewed.   Constitutional:       General: She is not in acute distress.     Appearance: She is well-developed.   HENT:      Head: Normocephalic and atraumatic.   Eyes:      Pupils: Pupils are equal,  round, and reactive to light.   Neck:      Musculoskeletal: Normal range of motion.   Cardiovascular:      Rate and Rhythm: Normal rate.   Pulmonary:      Effort: Pulmonary effort is normal. No respiratory distress.   Musculoskeletal:         General: Deformity (hands bilaterally) present.   Skin:     General: Skin is warm and dry.      Capillary Refill: Capillary refill takes less than 2 seconds.   Neurological:      Mental Status: She is alert and oriented to person, place, and time.   Psychiatric:         Judgment: Judgment normal.         Assessment:       1. Skin ulcers of right feet           Wound 08/17/20 0916 Ulceration Right lateral Leg #1 (Active)   08/17/20 0916    Pre-existing: Yes   Primary Wound Type: Ulceration   Side: Right   Orientation: lateral   Location: Leg   Wound Number (optional): #1   Ankle-Brachial Index:    Pulses:    Removal Indication and Assessment:    Wound Outcome:    (Retired) Wound Type:    (Retired) Wound Length (cm):    (Retired) Wound Width (cm):    (Retired) Depth (cm):    Wound Description (Comments):    Removal Indications:    Wound Image   10/06/20 0920   Dressing Appearance Dry;Intact 10/06/20 0920   Drainage Amount Small 10/06/20 0920   Drainage Characteristics/Odor Serosanguineous 10/06/20 0920   Appearance Yellow;Red;Granulating;Slough 10/06/20 0920   Tissue loss description Full thickness 10/06/20 0920   Red (%), Wound Tissue Color 50 % 10/06/20 0920   Yellow (%), Wound Tissue Color 50 % 10/06/20 0920   Periwound Area Intact 10/06/20 0920   Wound Edges Undefined 10/06/20 0920   Wound Length (cm) 1 cm 10/06/20 0920   Wound Width (cm) 1.2 cm 10/06/20 0920   Wound Depth (cm) 0.1 cm 10/06/20 0920   Wound Volume (cm^3) 0.12 cm^3 10/06/20 0920   Wound Surface Area (cm^2) 1.2 cm^2 10/06/20 0920            Wound 08/17/20 0919 Ulceration Right dorsal Foot #2 (Active)   08/17/20 0919    Pre-existing: Yes   Primary Wound Type: Ulceration   Side: Right   Orientation: dorsal    Location: Foot   Wound Number (optional): #2   Ankle-Brachial Index:    Pulses:    Removal Indication and Assessment:    Wound Outcome:    (Retired) Wound Type:    (Retired) Wound Length (cm):    (Retired) Wound Width (cm):    (Retired) Depth (cm):    Wound Description (Comments):    Removal Indications:    Wound Image   10/06/20 0921   Dressing Appearance Dry;Intact 10/06/20 0921   Drainage Amount Small 10/06/20 0921   Drainage Characteristics/Odor Serosanguineous 10/06/20 0921   Appearance Red;Yellow;Granulating 10/06/20 0921   Tissue loss description Full thickness 10/06/20 0921   Red (%), Wound Tissue Color 50 % 10/06/20 0921   Yellow (%), Wound Tissue Color 50 % 10/06/20 0921   Periwound Area Intact 10/06/20 0921   Wound Edges Undefined 10/06/20 0921   Wound Length (cm) 1.5 cm 10/06/20 0921   Wound Width (cm) 0.6 cm 10/06/20 0921   Wound Depth (cm) 0.1 cm 10/06/20 0921   Wound Volume (cm^3) 0.09 cm^3 10/06/20 0921   Wound Surface Area (cm^2) 0.9 cm^2 10/06/20 0921           Plan:       Clean wounds with mild soap and water and pat dry  Hydrofera blue ready to wounds and cover with dry dressing, change every 3 days and PRN if soiled   Do not get wound dressings wet, if wet remove soiled dressings and apply new dressings  Observe for signs and symptoms of infection and report symptoms to clinic  RTC 1 month

## 2020-10-12 ENCOUNTER — PATIENT MESSAGE (OUTPATIENT)
Dept: RHEUMATOLOGY | Facility: CLINIC | Age: 70
End: 2020-10-12

## 2020-10-15 ENCOUNTER — PATIENT MESSAGE (OUTPATIENT)
Dept: VASCULAR SURGERY | Facility: CLINIC | Age: 70
End: 2020-10-15

## 2020-10-15 DIAGNOSIS — I87.2 VENOUS INSUFFICIENCY OF BOTH LOWER EXTREMITIES: Primary | ICD-10-CM

## 2020-10-16 ENCOUNTER — PROCEDURE VISIT (OUTPATIENT)
Dept: VASCULAR SURGERY | Facility: CLINIC | Age: 70
End: 2020-10-16
Payer: MEDICARE

## 2020-10-16 VITALS
SYSTOLIC BLOOD PRESSURE: 136 MMHG | TEMPERATURE: 98 F | DIASTOLIC BLOOD PRESSURE: 78 MMHG | HEART RATE: 81 BPM | BODY MASS INDEX: 26.19 KG/M2 | WEIGHT: 162.94 LBS | HEIGHT: 66 IN

## 2020-10-16 DIAGNOSIS — I87.2 VENOUS INSUFFICIENCY (CHRONIC) (PERIPHERAL): Primary | ICD-10-CM

## 2020-10-16 DIAGNOSIS — I87.2 VENOUS INSUFFICIENCY OF BOTH LOWER EXTREMITIES: ICD-10-CM

## 2020-10-16 PROCEDURE — 36478 PR ENDOVENOUS LASER, 1ST VEIN: ICD-10-PCS | Mod: S$PBB,RT,, | Performed by: SURGERY

## 2020-10-16 PROCEDURE — 36478 ENDOVENOUS LASER 1ST VEIN: CPT | Mod: S$PBB,RT,, | Performed by: SURGERY

## 2020-10-16 PROCEDURE — 36478 ENDOVENOUS LASER 1ST VEIN: CPT | Mod: PBBFAC,RT | Performed by: SURGERY

## 2020-10-16 RX ORDER — MELOXICAM 7.5 MG/1
7.5 TABLET ORAL DAILY
Qty: 7 TABLET | Refills: 0 | Status: SHIPPED | OUTPATIENT
Start: 2020-10-16 | End: 2020-11-06

## 2020-10-16 RX ORDER — MELOXICAM 7.5 MG/1
7.5 TABLET ORAL DAILY
Qty: 7 TABLET | Refills: 0 | Status: SHIPPED | OUTPATIENT
Start: 2020-10-16 | End: 2020-12-04 | Stop reason: SDUPTHER

## 2020-10-16 NOTE — PROCEDURES
Yamel Shah; 10/16/2020    Pre-Procedure Diagnosis: Right greater saphenous vein reflux; significant superficial venous insufficiency    Post-Procedure Diagnsosis: Same    Procedure: Laser endovenous ablation of the right greater saphenous vein    Surgeon: Timmy Claudio MD FACS     Anesthesia: Local    The skin of the leg was prepped and draped in sterile fashion.  Ultrasound-guidance was used throughout the procedure with a portable duplex ultrasound machine.  The right GSV was cannulated below the knee using a micro-puncture system.  An 0.035 wire J-tip followed by a 4-Fr Angiodynamics sheath was placed throughout the right GSV.   Using tumescent anesthesia, the entire sheathed portion of the GSV was anesthesized keeping the vein at least one cm from the skin; Klein pump was used.  Position of the tip of the laser was then reconfirmed to be approximately 2 cm from the saphenofemoral junction.  The 1470 nm laser was then activated and the entire length of the vein was treated at ~ 49 J/cm.  The fiber and sheath were then removed intact.  Duplex confirmed occlusion of the GSV with continued patency of the common femoral vein.   The incision was closed with Steri-strips.   Sterile compression dressings and a compression stocking were applied and the patient was discharged to home in a satisfactory condition.    Cannulation site: Below-the-knee    Sheath length: 10 + 29 cm    Mora of power: 7 W    Laser time: 206.4 sec    Joules: 1444.4 J           Timmy Claudio MD FACS   Vascular & Endovascular Surgery

## 2020-10-21 ENCOUNTER — HOSPITAL ENCOUNTER (OUTPATIENT)
Dept: VASCULAR SURGERY | Facility: CLINIC | Age: 70
Discharge: HOME | End: 2020-10-21
Attending: SURGERY
Payer: MEDICARE

## 2020-10-21 ENCOUNTER — OFFICE VISIT (OUTPATIENT)
Dept: VASCULAR SURGERY | Facility: CLINIC | Age: 70
End: 2020-10-21
Payer: MEDICARE

## 2020-10-21 VITALS
WEIGHT: 158.75 LBS | TEMPERATURE: 98 F | DIASTOLIC BLOOD PRESSURE: 65 MMHG | HEIGHT: 66 IN | BODY MASS INDEX: 25.51 KG/M2 | SYSTOLIC BLOOD PRESSURE: 141 MMHG | HEART RATE: 68 BPM

## 2020-10-21 DIAGNOSIS — I87.2 VENOUS INSUFFICIENCY OF BOTH LOWER EXTREMITIES: Primary | ICD-10-CM

## 2020-10-21 DIAGNOSIS — I87.2 VENOUS INSUFFICIENCY OF BOTH LOWER EXTREMITIES: ICD-10-CM

## 2020-10-21 PROCEDURE — 93971 EXTREMITY STUDY: CPT | Mod: PBBFAC | Performed by: SURGERY

## 2020-10-21 PROCEDURE — 93971 EXTREMITY STUDY: CPT | Mod: 26,S$PBB,, | Performed by: SURGERY

## 2020-10-21 PROCEDURE — 99214 PR OFFICE/OUTPT VISIT, EST, LEVL IV, 30-39 MIN: ICD-10-PCS | Mod: S$PBB,,, | Performed by: SURGERY

## 2020-10-21 PROCEDURE — 99214 OFFICE O/P EST MOD 30 MIN: CPT | Mod: S$PBB,,, | Performed by: SURGERY

## 2020-10-21 PROCEDURE — 93971 PR US DUPLEX, UPPER OR LOWER EXT VENOUS,UNILAT OR LTD: ICD-10-PCS | Mod: 26,S$PBB,, | Performed by: SURGERY

## 2020-10-21 PROCEDURE — 99999 PR PBB SHADOW E&M-EST. PATIENT-LVL IV: ICD-10-PCS | Mod: PBBFAC,,, | Performed by: SURGERY

## 2020-10-21 PROCEDURE — 99214 OFFICE O/P EST MOD 30 MIN: CPT | Mod: PBBFAC,25 | Performed by: SURGERY

## 2020-10-21 PROCEDURE — 99999 PR PBB SHADOW E&M-EST. PATIENT-LVL IV: CPT | Mod: PBBFAC,,, | Performed by: SURGERY

## 2020-10-21 RX ORDER — ALPRAZOLAM 0.5 MG/1
0.5 TABLET ORAL ONCE AS NEEDED
Qty: 2 TABLET | Refills: 0 | Status: SHIPPED | OUTPATIENT
Start: 2020-10-21 | End: 2020-12-15

## 2020-10-21 RX ORDER — MELOXICAM 7.5 MG/1
7.5 TABLET ORAL 2 TIMES DAILY
Qty: 10 TABLET | Refills: 0 | Status: SHIPPED | OUTPATIENT
Start: 2020-10-21 | End: 2020-10-26

## 2020-10-23 DIAGNOSIS — I87.2 VENOUS INSUFFICIENCY OF BOTH LOWER EXTREMITIES: Primary | ICD-10-CM

## 2020-10-26 ENCOUNTER — CLINICAL SUPPORT (OUTPATIENT)
Dept: REHABILITATION | Facility: OTHER | Age: 70
End: 2020-10-26
Attending: NURSE PRACTITIONER
Payer: MEDICARE

## 2020-10-26 DIAGNOSIS — R29.898 WEAKNESS OF BOTH UPPER EXTREMITIES: ICD-10-CM

## 2020-10-26 DIAGNOSIS — M54.2 PAINFUL CERVICAL RANGE OF MOTION: ICD-10-CM

## 2020-10-26 DIAGNOSIS — M47.812 CERVICAL SPONDYLOSIS: ICD-10-CM

## 2020-10-26 DIAGNOSIS — R29.3 POSTURE ABNORMALITY: ICD-10-CM

## 2020-10-26 PROCEDURE — 97162 PT EVAL MOD COMPLEX 30 MIN: CPT | Mod: PN

## 2020-10-27 PROBLEM — R29.3 POSTURE ABNORMALITY: Status: ACTIVE | Noted: 2020-10-27

## 2020-10-27 PROBLEM — M54.2 PAINFUL CERVICAL RANGE OF MOTION: Status: ACTIVE | Noted: 2020-10-27

## 2020-10-27 PROBLEM — R29.898 WEAKNESS OF BOTH UPPER EXTREMITIES: Status: ACTIVE | Noted: 2020-10-27

## 2020-10-29 NOTE — PLAN OF CARE
"  [facility] - PHYSICAL THERAPY EVALUATION     Name: Yamel Shah  Clinic Number: [id number]    Therapy Diagnosis:   Encounter Diagnoses   Name Primary?    Cervical spondylosis     Painful cervical range of motion     Weakness of both upper extremities     Posture abnormality      Physician: Viktoriya Camara, NP    Physician Orders: PT Eval and Treat     Medical Diagnosis from Referral: M47.812 (ICD-10-CM) - Cervical spondylosis    Evaluation Date: 10/26/2020  Authorization Period Expiration: 10/02/2021  Plan of Care Expiration: 12/21/20 (8 weeks)  Reassessment Due: 11/27/20  Visit # / Visits authorized: 1/ 1    Time In:  0930 (Patient arrived late to session)  Time Out: 1000  Total Billable Time: 30 minutes    Precautions: Hx of MRSA; Recent Endovenous laser procedure; Scleroderma with pulmonary involvement; Venous and arterial insufficiencies; chronic pain; Hx of fecal incontinence; Hx of contractures; (See medical problem list). Deformed hands/fingers.      Subjective   Date of onset: Months.     History of current condition - Yamel reports "I have arthritis at the R side of the my". "It is painful to turn my neck". Pain is at the right side with movements to both sides. She denies any MVA or falls recently. She denies any radicular symptoms to either UE's, and no shooting pain. Neck hurts with bending fwd, flexion.      Medical History:   Past Medical History:   Diagnosis Date    Abnormal Pap smear     Acid reflux     Allergy     Arthritis     GERD (gastroesophageal reflux disease)     History of migraine headaches     Hypertension     Idiopathic neuropathy 7/20/2012    ILD (interstitial lung disease) 11/6/2013    Iron deficiency anemia 3/18/2014    MRSA carrier     Osteopenia     Pneumonia     Pulmonary fibrosis     Pulmonary hypertension     Raynaud's disease     Scleroderma, diffuse     Sjogren's syndrome     Vitamin D deficiency 11/14/2013       Surgical History:   Yamel ROQUE" "Pablo  has a past surgical history that includes Dilation and curettage of uterus; Breast biopsy; Cervical conization w/ laser (1970); Hysterectomy (1990); Esophagogastroduodenoscopy; Colonoscopy; Esophagogastroduodenoscopy (N/A, 3/29/2019); Colonoscopy (N/A, 3/29/2019); Esophageal manometry with measurement of impedance (N/A, 3/4/2020); and 24 hour impedance pH monitoring of esophagus in patient not taking acid reducing medications (N/A, 3/4/2020).    Medications:   Yamel has a current medication list which includes the following prescription(s): albuterol, alprazolam, carboxymethylcellulose sodium, cephalexin, conjugated estrogens, ergocalciferol, meloxicam, meloxicam, multivitamin, mycophenolate mofetil, nifedipine, omeprazole, pravastatin, pregabalin, prevident 5000 sensitive, spironolactone, tadalafil, tizanidine, and trazodone, and the following Facility-Administered Medications: lidocaine-EPINEPHrine 1%-1:100,000 30 mL, lidocaine HCL 10 mg/ml (1%) 20 mL, sodium bicarbonate 10 mL in sodium chloride 0.9% 500 mL solution.    Allergies:   Review of patient's allergies indicates:   Allergen Reactions    Sulfa (sulfonamide antibiotics)      Other reaction(s): Rash        Imaging:  Imaging:   Xray Cervical Spine 12/6/2019:  FINDINGS:  Odontoid prevertebral soft tissues and posterior elements are intact.  Neural foramina are patent.  No fracture dislocation bone destruction seen.  There is mild DJD.    Impression:  Mild DJD    Prior Therapy: No  Prior Treatment: No  Social History:  Lives with .  Occupation: Retired  Leisure: [place], [employer] research at [employer]       Prior Level of Function: Unlimited   Current Level of Function: unable to sit to sewe or use computer for a long time; unable to turn or bend head fwd head comfortably  DME owned/used: NA    Pain: Took pain medication today  Current 5/10, worst 8/10, best 3/10   Location: right neck  Description: "Pulling/tearing muscle, some " "sharp"  Aggravating Factors: Prolonged sitting, neck rotation B; bending head down   Easing Factors: pain medication and heating pad  Disturbed Sleep: No    Pattern of pain questions:  1.  Where is your pain the worst? R side neck  2.  Is your pain constant or intermittent? Constant  3.  Does bending forward make your typical pain worse? Yes  4.  Since the start of your neck pain, has there been a change in your bowel or bladder? No  5.  What can't you do now that you use to be able to do? Turn head to either sides, bending neck fwd, sitting for a long time at computer      Pts goals: "Feel better; alleviate pain".       Red Flag Screening:   Cough  Sneeze  Strain: (--)  Bladder/ bowel: (--)  Falls: (--)  Night pain: (--)  Unexplained weight loss: (--)  General health: "Fair; due to scleroderma; take a lot of medication".     OBJECTIVE     Postural examination/scapula alignment: Rounded shoulder, Head forward and Slouched posture  Joint integrity: Hypomobile cervical spine  Skin integrity: Consistent with venous and arterial condition. Patient with compression wrap at R LE.   Edema: Mild    Cervical Range of Motion      Flexion 35 deg ERP   Extension 30 deg ERP   Side bending Right 10 deg ERP   Side bending Left 10 deg ERP   Rotation Right ERP right 50 deg   Rotation Left  ERP right 45 deg   Neck Protraction WFL==>ERP   Neck Retraction WFL==>ERP       Upper Extremity Strength  (R) UE  (L) UE    Shoulder flexion: 4/5 Shoulder flexion: 4/5   Shoulder Abduction: 4/5 Shoulder abduction: 4/5   Elbow flexion: 4/5 Elbow flexion: 4/5   Elbow extension: 4/5 Elbow extension: 4/5   Wrist flexion: 4/5 Wrist flexion: 4/5   Wrist extension: 4/5 Wrist extension: 4/5    4/5 : 4/5     NEUROLOGICAL SCREEN    Sensory deficit: WNL B Upper Quarter to light touch    Special Tests: DNT today    Reflexes: Did not test today    REPEATED TEST MOVEMENTS:   Repeated Protraction in Sitting end range pain  worse   Repeated Flexion in " Sitting worse  tightness   Repeated Retraction in Sitting  pain during motion  no worse   Repeated Retraction Extension in Sitting worse   Repeated Protraction in lying worse   Repeated Flexion in lying worse   Repeated Retraction in lying mild discomfort   Repeated Retraction Extension in lying DNT     GAIT:  Assistive Device used: none  Level of Assistance: independent  Patient displays the following gait deviations:  unsteady gait.            Treatment     Total Treatment time separate from Evaluation: 00 minutes    Yamel received therapeutic exercises to develop/improve posture, lumbar/cervical ROM, strength and muscular endurance for 00 minutes including the following exercises:     Continue self management at home per relieving factors identified.  HEP at next visit.  Seated cervical retractions  Open book (upper trunk/neck rotation SL)  Scap retractions  UT stretch    Yamel received cold pack for 00 minutes.    Assessment   Yamel is a 69 y.o. female referred to [facility] with a medical diagnosis of Cervical spondylosis. Pt presents with R side neck/shoulder pain mostly with B rotations. Patient with cervical spine imaging study revealing mild DJD. Patient presented with limited and  painful cervical range of motion; weakness of both upper extremities; and posture abnormality noted. Repeated movement asessment for pain pattern, revealed a possible cervical spine retraction positive response in relation to none pain worsening movement. Patient will be provided with postural correction, and cervico-thoracic stabilization exercises, at next visit.       Pt prognosis is Good.   Pt will benefit from skilled outpatient Physical Therapy to address the deficits stated above and in the chart below, provide pt/family education, and to maximize pt's level of independence.     Plan of care discussed with patient: Yes  Pt's spiritual, cultural and educational needs considered and patient is agreeable  to the plan of care and goals as stated below:     Anticipated Barriers for therapy: Multiple co-morbidities    PT Evaluation Completed? Yes    Medical necessity is demonstrated by the following problem list.    Pt presents with the following impairments:     History  Co-morbidities and personal factors that may impact the plan of care Co-morbidities:   Hx of MRSA; Recent Endovenous laser procedure; Scleroderma with pulmonary involvement; Venous and arterial insufficiencies; chronic pain; Hx of fecal incontinence; Hx of contractures; (See medical problem list). Deformed hands/fingers.    Personal Factors:   lifestyle     moderate   Examination  Body Structures and Functions, activity limitations and participation restrictions that may impact the plan of care Body Regions:   neck  upper extremities  trunk    Body Systems:    ROM  strength  balance  transfers  transitions    Participation Restrictions:   Per tolerance    Activity limitations:   Learning and applying knowledge  no deficits    General Tasks and Commands  no deficits    Communication  no deficits    Mobility  lifting and carrying objects  walking  using transportation (bus, train, plane, car)  driving (bike, car, motorcycle)    Self care  washing oneself (bathing, drying, washing hands)  dressing    Domestic Life  shopping  cooking  doing house work (cleaning house, washing dishes, laundry)  assisting others    Interactions/Relationships  no deficits    Life Areas  no deficits    Community and Social Life  community life  recreation and leisure         moderate   Clinical Presentation evolving clinical presentation with changing clinical characteristics moderate   Decision Making/ Complexity Score: moderate       GOALS: Pt is in agreement with the following goals.      Short term goals: 4 weeks    1.  Pt will demonstrate  >50%cervical AROM  W/o increasing ERP by more than 1-2 points  2. Pt will initiate home exercise program to improve strength,  "flexibility, endurance, and neck mobility  to return pt to PLOF   3. Pt will report improved ability to lift and carry, and sustain good posture while performing ADL's and work w/o  Increasing neck pain     Long term goals: 8 weeks  1. Pt will demonstrate >70% of  cervical AROM without increasing  ERP by more than 1 point,  which results in WFL ROM of neck for ease with ADLs and driving, and working/reading  2. Pt will demonstrate reduced pain and improved functional outcomes as reported on the FOTO by reaching a limitation score of  20% or less in order to demonstrate subjective improvement in pt's condition.    3. Pt will demonstrate independence with reducing or controlling symptoms with ther ex, movement, or position independently, able to reduce pain at worst at B shoulders 3-4 points overall on pain scale using strategies taught in therapy  4. Pt will demonstrate independence with the HEP at discharge.  5. Pt will demonstrate B UE MMT strength at 4+/5     Plan   Outpatient physical therapy 2x week for 8 weeks (POC Exp:12/21/20) to include the following:   - Patient education  - Therapeutic exercise  - Manual therapy  - Dry needling   - Neuromuscular Re-education  - Therapeutic activity   - Modalities    Pt may be seen by PTA as part of the rehabilitation team.     Therapist: Kenton Snell, PT  10/27/2020      "I certify the need for these services furnished under this plan of treatment and while under my care."    ____________________________________  Physician/Referring Practitioner    _______________  Date of Signature            "

## 2020-11-06 ENCOUNTER — CLINICAL SUPPORT (OUTPATIENT)
Dept: REHABILITATION | Facility: OTHER | Age: 70
End: 2020-11-06
Attending: NURSE PRACTITIONER
Payer: MEDICARE

## 2020-11-06 ENCOUNTER — OFFICE VISIT (OUTPATIENT)
Dept: INTERNAL MEDICINE | Facility: CLINIC | Age: 70
End: 2020-11-06
Payer: MEDICARE

## 2020-11-06 ENCOUNTER — LAB VISIT (OUTPATIENT)
Dept: LAB | Facility: HOSPITAL | Age: 70
End: 2020-11-06
Attending: INTERNAL MEDICINE
Payer: MEDICARE

## 2020-11-06 VITALS
RESPIRATION RATE: 18 BRPM | HEIGHT: 66 IN | WEIGHT: 159.63 LBS | SYSTOLIC BLOOD PRESSURE: 124 MMHG | OXYGEN SATURATION: 96 % | DIASTOLIC BLOOD PRESSURE: 64 MMHG | TEMPERATURE: 98 F | HEART RATE: 67 BPM | BODY MASS INDEX: 25.66 KG/M2

## 2020-11-06 DIAGNOSIS — M34.9 SCLERODERMA: ICD-10-CM

## 2020-11-06 DIAGNOSIS — I10 ESSENTIAL HYPERTENSION: Primary | ICD-10-CM

## 2020-11-06 DIAGNOSIS — M54.2 PAINFUL CERVICAL RANGE OF MOTION: ICD-10-CM

## 2020-11-06 DIAGNOSIS — R29.898 WEAKNESS OF BOTH UPPER EXTREMITIES: ICD-10-CM

## 2020-11-06 DIAGNOSIS — E55.9 VITAMIN D DEFICIENCY: ICD-10-CM

## 2020-11-06 DIAGNOSIS — L97.911: ICD-10-CM

## 2020-11-06 DIAGNOSIS — R29.3 POSTURE ABNORMALITY: ICD-10-CM

## 2020-11-06 DIAGNOSIS — M34.9 SCLERODERMA WITH PULMONARY INVOLVEMENT: ICD-10-CM

## 2020-11-06 DIAGNOSIS — K21.9 GASTROESOPHAGEAL REFLUX DISEASE, UNSPECIFIED WHETHER ESOPHAGITIS PRESENT: ICD-10-CM

## 2020-11-06 PROCEDURE — 99214 PR OFFICE/OUTPT VISIT, EST, LEVL IV, 30-39 MIN: ICD-10-PCS | Mod: S$PBB,,, | Performed by: INTERNAL MEDICINE

## 2020-11-06 PROCEDURE — 36415 COLL VENOUS BLD VENIPUNCTURE: CPT | Mod: PO

## 2020-11-06 PROCEDURE — 99214 OFFICE O/P EST MOD 30 MIN: CPT | Mod: S$PBB,,, | Performed by: INTERNAL MEDICINE

## 2020-11-06 PROCEDURE — 99213 OFFICE O/P EST LOW 20 MIN: CPT | Mod: PBBFAC,PO | Performed by: INTERNAL MEDICINE

## 2020-11-06 PROCEDURE — 99999 PR PBB SHADOW E&M-EST. PATIENT-LVL III: ICD-10-PCS | Mod: PBBFAC,,, | Performed by: INTERNAL MEDICINE

## 2020-11-06 PROCEDURE — 99999 PR PBB SHADOW E&M-EST. PATIENT-LVL III: CPT | Mod: PBBFAC,,, | Performed by: INTERNAL MEDICINE

## 2020-11-06 PROCEDURE — 82306 VITAMIN D 25 HYDROXY: CPT

## 2020-11-06 PROCEDURE — 97110 THERAPEUTIC EXERCISES: CPT | Mod: PN,CQ

## 2020-11-06 RX ORDER — ERGOCALCIFEROL 1.25 MG/1
50000 CAPSULE ORAL
Qty: 12 CAPSULE | Refills: 1 | Status: SHIPPED | OUTPATIENT
Start: 2020-11-06 | End: 2021-03-22

## 2020-11-06 RX ORDER — TIZANIDINE 4 MG/1
TABLET ORAL
COMMUNITY
Start: 2020-11-02 | End: 2020-11-13 | Stop reason: SDUPTHER

## 2020-11-06 NOTE — PROGRESS NOTES
"    Physical Therapy Daily Treatment Note     Name: Yamel Shah  Clinic Number: [id number]    Therapy Diagnosis:   Encounter Diagnoses   Name Primary?    Painful cervical range of motion     Weakness of both upper extremities     Posture abnormality      Physician: Viktoriya Camara NP    Visit Date: 11/6/2020    Physician Orders: PT Eval and Treat      Medical Diagnosis from Referral: M47.812 (ICD-10-CM) - Cervical spondylosis     Evaluation Date: 10/26/2020  Authorization Period Expiration: 10/02/2021  Plan of Care Expiration: 12/21/20 (8 weeks)  Reassessment Due: 11/27/20  Visit # / Visits authorized: 2/ 20     Time In: 1200 (arrived 75 minutes late )  Time Out: 1245  Total Billable Time: 45 minutes    Precautions: Hx of MRSA; Recent Endovenous laser procedure; Scleroderma with pulmonary involvement; Venous and arterial insufficiencies; chronic pain; Hx of fecal incontinence; Hx of contractures; (See medical problem list). Deformed hands/fingers.         Subjective     Pt reports: States her neck hurts when she tries to turn her head or look down. States she did see some improvement since her last PT session.   She was compliant with home exercise program.  Response to previous treatment: tolerated well  Functional change: na  Prior Level of Function: Unlimited   Current Level of Function: unable to sit to sewe or use computer for a long time; unable to turn or bend head fwd head comfortably  DME owned/used: NA     Pain: Took pain medication today  Current 4/10, worst 8/10, best 3/10   Location: right neck  Description: "Pulling/tearing muscle, some sharp"  Aggravating Factors: Prolonged sitting, neck rotation B; bending head down   Easing Factors: pain medication and heating pad  Disturbed Sleep: No     Pattern of pain questions:  1.  Where is your pain the worst? R side neck  2.  Is your pain constant or intermittent? Constant  3.  Does bending forward make your typical pain worse? Yes  4.  Since the " "start of your neck pain, has there been a change in your bowel or bladder? No  5.  What can't you do now that you use to be able to do? Turn head to either sides, bending neck fwd, sitting for a long time at computer        Pts goals: "Feel better; alleviate pain".       Objective     Yamel received therapeutic exercises to develop/improve posture, lumbar/cervical ROM, strength and muscular endurance for 45 minutes including the following exercises:      Seated cervical retractions 20x3"  +Seated no monies/slouch corrections otb 20x3" modified with loops for wrists  Open book (upper trunk/neck rotation SL) 20x ea  +Supine chin tucks 20x  Scap retractions 20x3"  UT stretch 3x30" B  +Rows otb 20x  modified with loops for wrists  +Shld ext  otb 20x -modified with loops for wrists          Yamel received the following manual therapy techniques: Manual traction and Soft tissue Mobilization were applied to the: 00 for 00 minutes, including:  NP    Yamel participated in neuromuscular re-education activities to improve: Posture for 00 minutes. The following activities were included:  NP      Yamel received hot pack for 00 minutes to NP.    Ymael received cold pack for 00 minutes to NP.      Home Exercises Provided and Patient Education Provided     Education provided:   - Pt education regarding proper technique for there-ex/HEP, as well as rationale for exercise/ POC.       Written Home Exercises Provided: yes.  Exercises were reviewed and Yamel was able to demonstrate them prior to the end of the session.  Yamel demonstrated good  understanding of the education provided.     See EMR under Patient Instructions for exercises provided 11/6/2020.    Assessment   Pt tolerated exercise well. Decreased cervical ROM was noted in all planes. Pt reported increased pain with cervical flexion. Pt was able to perform postural reeducation exercises with min >mod verbal/tactile cuing. Modification was " required with T-band exercises for hands/UE with good performance noted. HEP was updated and pt received education regarding safe and proper jay for HEP execution.     Yamel is progressing well towards her goals.   Pt prognosis is Good.       Pt will continue to benefit from skilled outpatient physical therapy to address the deficits listed in the problem list box on initial evaluation, provide pt/family education and to maximize pt's level of independence in the home and community environment.     Pt's spiritual, cultural and educational needs considered and pt agreeable to plan of care and goals.     Anticipated Barriers for therapy: Multiple co-morbidities    Short term goals: 4 weeks    1.  Pt will demonstrate  >50%cervical AROM  W/o increasing ERP by more than 1-2 points  2. Pt will initiate home exercise program to improve strength, flexibility, endurance, and neck mobility  to return pt to PLOF   3. Pt will report improved ability to lift and carry, and sustain good posture while performing ADL's and work w/o  Increasing neck pain     Long term goals: 8 weeks  1. Pt will demonstrate >70% of  cervical AROM without increasing  ERP by more than 1 point,  which results in WFL ROM of neck for ease with ADLs and driving, and working/reading  2. Pt will demonstrate reduced pain and improved functional outcomes as reported on the FOTO by reaching a limitation score of  20% or less in order to demonstrate subjective improvement in pt's condition.    3. Pt will demonstrate independence with reducing or controlling symptoms with ther ex, movement, or position independently, able to reduce pain at worst at B shoulders 3-4 points overall on pain scale using strategies taught in therapy  4. Pt will demonstrate independence with the HEP at discharge.  5. Pt will demonstrate B UE MMT strength at 4+/5       Plan   Outpatient physical therapy 2x week for 8 weeks (POC Exp:12/21/20) to include the following:   - Patient  education  - Therapeutic exercise  - Manual therapy  - Dry needling   - Neuromuscular Re-education  - Therapeutic activity   - Modalities    Continue with current POC for further strengthening, flexibility, improve ROM and ADL performance in accordance with goals set forth by supervising PT.  Will progress there-ex as tolerated.     Ag Trejo, PTA

## 2020-11-07 LAB — 25(OH)D3+25(OH)D2 SERPL-MCNC: 38 NG/ML (ref 30–96)

## 2020-11-11 ENCOUNTER — CLINICAL SUPPORT (OUTPATIENT)
Dept: REHABILITATION | Facility: OTHER | Age: 70
End: 2020-11-11
Attending: NURSE PRACTITIONER
Payer: MEDICARE

## 2020-11-11 DIAGNOSIS — M54.2 PAINFUL CERVICAL RANGE OF MOTION: ICD-10-CM

## 2020-11-11 DIAGNOSIS — R29.3 POSTURE ABNORMALITY: ICD-10-CM

## 2020-11-11 DIAGNOSIS — R29.898 WEAKNESS OF BOTH UPPER EXTREMITIES: ICD-10-CM

## 2020-11-11 PROCEDURE — 97110 THERAPEUTIC EXERCISES: CPT | Mod: PN,CQ

## 2020-11-11 NOTE — PROGRESS NOTES
"    Physical Therapy Daily Treatment Note     Name: Yamel Shah  Clinic Number: [id number]    Therapy Diagnosis:   Encounter Diagnoses   Name Primary?    Painful cervical range of motion     Weakness of both upper extremities     Posture abnormality      Physician: Viktoriya Camara NP    Visit Date: 11/11/2020    Physician Orders: PT Eval and Treat      Medical Diagnosis from Referral: M47.812 (ICD-10-CM) - Cervical spondylosis     Evaluation Date: 10/26/2020  Authorization Period Expiration: 10/02/2021  Plan of Care Expiration: 12/21/20 (8 weeks)  Reassessment Due: 11/27/20  Visit # / Visits authorized: 3/ 20     Time In: 1145  Time Out: 1230  Total Billable Time: 45 minutes    Precautions: Hx of MRSA; Recent Endovenous laser procedure; Scleroderma with pulmonary involvement; Venous and arterial insufficiencies; chronic pain; Hx of fecal incontinence; Hx of contractures; (See medical problem list). Deformed hands/fingers.         Subjective     Pt reports: she is feeling a little better since her last PT session. States she is having some pain in her UT on the R.   She was compliant with home exercise program.  Response to previous treatment: tolerated well  Functional change: na  Prior Level of Function: Unlimited   Current Level of Function: unable to sit to sewe or use computer for a long time; unable to turn or bend head fwd head comfortably  DME owned/used: NA     Pain: Took pain medication today  Current 5/10, worst 8/10, best 3/10   Location: right neck  Description: "Pulling/tearing muscle, some sharp"  Aggravating Factors: Prolonged sitting, neck rotation B; bending head down   Easing Factors: pain medication and heating pad  Disturbed Sleep: No     Pattern of pain questions:  1.  Where is your pain the worst? R side neck  2.  Is your pain constant or intermittent? Constant  3.  Does bending forward make your typical pain worse? Yes  4.  Since the start of your neck pain, has there been a change " "in your bowel or bladder? No  5.  What can't you do now that you use to be able to do? Turn head to either sides, bending neck fwd, sitting for a long time at computer        Pts goals: "Feel better; alleviate pain".       Objective     Yamel received therapeutic exercises to develop/improve posture, lumbar/cervical ROM, strength and muscular endurance for 45 minutes including the following exercises:      Seated cervical retractions 20x3"  Seated no monies/slouch corrections otb 20x3" modified with loops for wrists  Open book (upper trunk/neck rotation SL) 20x ea  Supine chin tucks 20x  Scap retractions 20x3"  UT stretch 3x30" B  Rows otb 20x  modified with loops for wrists  Shld ext  otb 20x -modified with loops for wrists          Yamel received the following manual therapy techniques: Manual traction and Soft tissue Mobilization were applied to the: 00 for 00 minutes, including:  NP    Yamel participated in neuromuscular re-education activities to improve: Posture for 00 minutes. The following activities were included:  NP      Yamel received hot pack for 00 minutes to NP.    Yamel received cold pack for 00 minutes to NP.      Home Exercises Provided and Patient Education Provided     Education provided:   - Pt education regarding proper technique for there-ex/HEP, as well as rationale for exercise/ POC.       Written Home Exercises Provided: Patient instructed to cont prior HEP.  Exercises were reviewed and Yamel was able to demonstrate them prior to the end of the session.  Yamel demonstrated good  understanding of the education provided.     See EMR under Patient Instructions for exercises provided 11/6/2020.    Assessment   Pt tolerated exercise well. Decreased cervical ROM was noted in all planes. Pt reported increased pain with cervical flexion. Weakness in scapular muscles is noted as well as kyphotic posture is noted in sitting/standing postures. Modification was required " with T-band exercises for use hands/UE with good tolerance. Will continue to focus on improving scap strength and postural reeducation.     Yamel is progressing well towards her goals.   Pt prognosis is Good.       Pt will continue to benefit from skilled outpatient physical therapy to address the deficits listed in the problem list box on initial evaluation, provide pt/family education and to maximize pt's level of independence in the home and community environment.     Pt's spiritual, cultural and educational needs considered and pt agreeable to plan of care and goals.     Anticipated Barriers for therapy: Multiple co-morbidities    Short term goals: 4 weeks    1.  Pt will demonstrate  >50%cervical AROM  W/o increasing ERP by more than 1-2 points  2. Pt will initiate home exercise program to improve strength, flexibility, endurance, and neck mobility  to return pt to PLOF   3. Pt will report improved ability to lift and carry, and sustain good posture while performing ADL's and work w/o  Increasing neck pain     Long term goals: 8 weeks  1. Pt will demonstrate >70% of  cervical AROM without increasing  ERP by more than 1 point,  which results in WFL ROM of neck for ease with ADLs and driving, and working/reading  2. Pt will demonstrate reduced pain and improved functional outcomes as reported on the FOTO by reaching a limitation score of  20% or less in order to demonstrate subjective improvement in pt's condition.    3. Pt will demonstrate independence with reducing or controlling symptoms with ther ex, movement, or position independently, able to reduce pain at worst at B shoulders 3-4 points overall on pain scale using strategies taught in therapy  4. Pt will demonstrate independence with the HEP at discharge.  5. Pt will demonstrate B UE MMT strength at 4+/5       Plan   Outpatient physical therapy 2x week for 8 weeks (POC Exp:12/21/20) to include the following:   - Patient education  - Therapeutic  exercise  - Manual therapy  - Dry needling   - Neuromuscular Re-education  - Therapeutic activity   - Modalities    Continue with current POC for further strengthening, flexibility, improve ROM and ADL performance in accordance with goals set forth by supervising PT.  Will progress there-ex as tolerated.     Ag Trejo, PTA

## 2020-11-13 ENCOUNTER — CLINICAL SUPPORT (OUTPATIENT)
Dept: REHABILITATION | Facility: OTHER | Age: 70
End: 2020-11-13
Attending: NURSE PRACTITIONER
Payer: MEDICARE

## 2020-11-13 ENCOUNTER — OFFICE VISIT (OUTPATIENT)
Dept: PAIN MEDICINE | Facility: CLINIC | Age: 70
End: 2020-11-13
Payer: MEDICARE

## 2020-11-13 VITALS
OXYGEN SATURATION: 100 % | WEIGHT: 174.19 LBS | HEIGHT: 66 IN | TEMPERATURE: 98 F | BODY MASS INDEX: 27.99 KG/M2 | RESPIRATION RATE: 18 BRPM | SYSTOLIC BLOOD PRESSURE: 135 MMHG | HEART RATE: 83 BPM | DIASTOLIC BLOOD PRESSURE: 64 MMHG

## 2020-11-13 DIAGNOSIS — M47.812 CERVICAL SPONDYLOSIS: ICD-10-CM

## 2020-11-13 DIAGNOSIS — M54.2 PAINFUL CERVICAL RANGE OF MOTION: Primary | ICD-10-CM

## 2020-11-13 DIAGNOSIS — G60.9 IDIOPATHIC NEUROPATHY: ICD-10-CM

## 2020-11-13 DIAGNOSIS — M34.89 CUTANEOUS SCLERODERMA: ICD-10-CM

## 2020-11-13 DIAGNOSIS — G89.4 CHRONIC PAIN DISORDER: Primary | ICD-10-CM

## 2020-11-13 DIAGNOSIS — R29.3 POSTURE ABNORMALITY: ICD-10-CM

## 2020-11-13 DIAGNOSIS — R29.898 WEAKNESS OF BOTH UPPER EXTREMITIES: ICD-10-CM

## 2020-11-13 DIAGNOSIS — M79.18 MYOFASCIAL PAIN: ICD-10-CM

## 2020-11-13 DIAGNOSIS — M50.30 DDD (DEGENERATIVE DISC DISEASE), CERVICAL: ICD-10-CM

## 2020-11-13 PROCEDURE — 97110 THERAPEUTIC EXERCISES: CPT | Mod: PN

## 2020-11-13 PROCEDURE — 99213 OFFICE O/P EST LOW 20 MIN: CPT | Mod: S$PBB,,, | Performed by: NURSE PRACTITIONER

## 2020-11-13 PROCEDURE — 99213 PR OFFICE/OUTPT VISIT, EST, LEVL III, 20-29 MIN: ICD-10-PCS | Mod: S$PBB,,, | Performed by: NURSE PRACTITIONER

## 2020-11-13 PROCEDURE — 99215 OFFICE O/P EST HI 40 MIN: CPT | Mod: PBBFAC | Performed by: NURSE PRACTITIONER

## 2020-11-13 PROCEDURE — 99999 PR PBB SHADOW E&M-EST. PATIENT-LVL V: ICD-10-PCS | Mod: PBBFAC,,, | Performed by: NURSE PRACTITIONER

## 2020-11-13 PROCEDURE — 99999 PR PBB SHADOW E&M-EST. PATIENT-LVL V: CPT | Mod: PBBFAC,,, | Performed by: NURSE PRACTITIONER

## 2020-11-13 RX ORDER — PREGABALIN 300 MG/1
300 CAPSULE ORAL DAILY
Qty: 30 CAPSULE | Refills: 6 | Status: SHIPPED | OUTPATIENT
Start: 2020-11-13 | End: 2021-05-06 | Stop reason: SDUPTHER

## 2020-11-13 RX ORDER — TIZANIDINE 4 MG/1
4 TABLET ORAL NIGHTLY PRN
Qty: 30 TABLET | Refills: 3 | Status: SHIPPED | OUTPATIENT
Start: 2020-11-13 | End: 2021-01-08 | Stop reason: SDUPTHER

## 2020-11-13 NOTE — PROGRESS NOTES
"    Physical Therapy Daily Treatment Note     Name: Yamel Shah  Clinic Number: [id number]    Therapy Diagnosis:   Encounter Diagnoses   Name Primary?    Painful cervical range of motion Yes    Weakness of both upper extremities     Posture abnormality      Physician: Viktoriya Camara NP    Visit Date: 11/13/2020    Physician Orders: PT Eval and Treat      Medical Diagnosis from Referral: M47.812 (ICD-10-CM) - Cervical spondylosis     Evaluation Date: 10/26/2020  Authorization Period Expiration: 10/02/2021  Plan of Care Expiration: 12/21/20 (8 weeks)  Reassessment Due: 11/27/20  Visit # / Visits authorized: 4/ 20     Time In: 1120  Time Out: 1205  Total Billable Time: 45 minutes    Precautions: Hx of MRSA; Recent Endovenous laser procedure; Scleroderma with pulmonary involvement; Venous and arterial insufficiencies; chronic pain; Hx of fecal incontinence; Hx of contractures; (See medical problem list). Deformed hands/fingers.         Subjective     Yamel returns to PT and reports feeling improving in her condition. She still felt a 4/10 pain at the R UT this morning, which is less at this moment. Turning her head left to right has gotten a lot better she reports.     She was compliant with home exercise program.  Response to previous treatment: tolerated well  Functional change: na  Prior Level of Function: Unlimited   Current Level of Function: unable to sit to sewe or use computer for a long time; unable to turn or bend head fwd head comfortably  DME owned/used: NA     Pain: Took pain medication today  Current 4/10, worst 8/10, best 3/10   Location: right neck  Description: "Pulling/tearing muscle, some sharp"  Aggravating Factors: Prolonged sitting, neck rotation B; bending head down   Easing Factors: pain medication and heating pad  Disturbed Sleep: No     Pattern of pain questions:  1.  Where is your pain the worst? R side neck  2.  Is your pain constant or intermittent? Constant  3.  Does bending " "forward make your typical pain worse? Yes  4.  Since the start of your neck pain, has there been a change in your bowel or bladder? No  5.  What can't you do now that you use to be able to do? Turn head to either sides, bending neck fwd, sitting for a long time at computer        Pts goals: "Feel better; alleviate pain".       Objective     Yamel received therapeutic exercises to develop/improve posture, lumbar/cervical ROM, strength and muscular endurance for 45 minutes including the following exercises:      Recumbent bike for 5 minutes L 1    Seated cervical retractions 2x 10 x2-3"  Seated no monies/slouch corrections otb 20x3" modified with loops for wrists  Open book (upper trunk/neck rotation SL) 20x ea  Supine chin tucks 20x  Scap retractions 20x3"   UT stretch 3x30" B   Rows otb 20x  modified with loops for wrists  Shld ext  otb 20x -modified with loops for wrists    Yamel received the following manual therapy techniques: Manual traction and Soft tissue Mobilization were applied to the: 00 for 00 minutes, including:  NP    Yamel participated in neuromuscular re-education activities to improve: Posture for 00 minutes. The following activities were included:  NP    Yamel received hot pack for 00 minutes to NP.    Yamel received cold pack for 00 minutes to NP.      Home Exercises Provided and Patient Education Provided     Education provided:   - Pt education regarding proper technique for there-ex/HEP, as well as rationale for exercise/ POC.       Written Home Exercises Provided: Patient instructed to cont prior HEP.  Exercises were reviewed and Yamel was able to demonstrate them prior to the end of the session.  Yamel demonstrated good  understanding of the education provided.     See EMR under Patient Instructions for exercises provided 11/6/2020.    Assessment     Patient completed all therex today w/o increasing prior symptoms. She reports increased exertion and muscle fatigue " with the exercises. It appeared that she is progressing well with exercise tolerance. Continue with cervico-thoracic stabilization and postural correction exercises.     Yamel is progressing well towards her goals.   Pt prognosis is Good.       Pt will continue to benefit from skilled outpatient physical therapy to address the deficits listed in the problem list box on initial evaluation, provide pt/family education and to maximize pt's level of independence in the home and community environment.     Pt's spiritual, cultural and educational needs considered and pt agreeable to plan of care and goals.     Anticipated Barriers for therapy: Multiple co-morbidities    Short term goals: 4 weeks    1.  Pt will demonstrate  >50%cervical AROM  W/o increasing ERP by more than 1-2 points  2. Pt will initiate home exercise program to improve strength, flexibility, endurance, and neck mobility  to return pt to PLOF   3. Pt will report improved ability to lift and carry, and sustain good posture while performing ADL's and work w/o  Increasing neck pain     Long term goals: 8 weeks  1. Pt will demonstrate >70% of  cervical AROM without increasing  ERP by more than 1 point,  which results in WFL ROM of neck for ease with ADLs and driving, and working/reading  2. Pt will demonstrate reduced pain and improved functional outcomes as reported on the FOTO by reaching a limitation score of  20% or less in order to demonstrate subjective improvement in pt's condition.    3. Pt will demonstrate independence with reducing or controlling symptoms with ther ex, movement, or position independently, able to reduce pain at worst at B shoulders 3-4 points overall on pain scale using strategies taught in therapy  4. Pt will demonstrate independence with the HEP at discharge.  5. Pt will demonstrate B UE MMT strength at 4+/5       Plan   Outpatient physical therapy 2x week for 8 weeks (POC Exp:12/21/20) to include the following:   - Patient  education  - Therapeutic exercise  - Manual therapy  - Dry needling   - Neuromuscular Re-education  - Therapeutic activity   - Modalities    Continue with current POC for further strengthening, flexibility, improve ROM and ADL performance in accordance with goals set forth by supervising PT.  Will progress there-ex as tolerated.     Kenton Snell, PT

## 2020-11-13 NOTE — PROGRESS NOTES
Chronic Pain - Follow Up    Referring Physician: No ref. provider found    Chief Complaint:   Chief Complaint   Patient presents with    Neck Pain        SUBJECTIVE: Disclaimer: This note has been generated using voice-recognition software. There may be typographical errors that have been missed during proof-reading    Interval History 11/13/2020:  The patient returns to clinic today for follow up of neck and foot pain. She continues to report bilateral foot pain. Since last visit, she had a venous ablation procedure on her right leg through [facility]. She is scheduled for the left side in the next month. She continues to report bilateral foot pain, worse at night. She continues to report neck pain that is tight and aching in nature. She denies any radicular pain. Her pain is worse flexion and turning her head to the side. She is currently participating in physical therapy with some benefit. She continues to take Lyrica but does ask if this could be increased. She continues to take Zanaflex with benefit. She denies any other health changes. Her pain today is 5/10    Interval History 10/2/2020:  The patient returns to clinic today for follow up of foot pain. She reports increased bilateral foot pain over the last few months. This pain is burning in nature. This pain is worse at night. She continues to have ulcers to her feet related to her scleroderma. She would like to restart the Lyrica. She also reports increased neck pain that is sore and aching in nature. She denies any radiating arm pain. This is worse with turning her head and at night. She denies any other health changes. Her pain today is 7/10.    Interval History 6/5/2020:  The patient requests audio visit today for follow up of bilateral foot pain. She reports intermittent foot pain that is tolerable at this time. She has discontinued Lyrica as of April. She continues to have ulcers to her feet. She does have wound care. She denies any other health  changes.     Interval History 1/17/2020:  The patient returns to clinic today for follow up. She continues to report bilateral foot pain. She describes this pain as burning and tingling in nature. At last visit, we decreased her Lyrica dose to 100 mg at night. She reports increased pain since then. She would like to go to back to the 150 mg dose. She continues to have ulcers to her feet, left worse than right. She continues to participate in a home wound care program. She denies any other health changes. Her pain today is 6/10.    Interval History 12/17/2019:  The patient returns to clinic today for follow up. She reports improving foot pain. She reports improved healing ulcers to her left foot. She continues to report right foot pain that is burning and tingling in nature. She continues to participate in a home wound care routine. She continues to take Lyrica. She asks about decreasing this. She denies any other health changes. Her pain today is 5/10.    Interval History 9/17/2019:  The patient returns to clinic today for follow up. She continues to report bilateral foot pain that is burning and tingling in nature. Her pain is worse with sitting and at night. She continues to have slow healing ulcers to both feet. She continues to perform a home wound care program. She is no longer taking Gabapentin which was previously called in. She is now taking Pregabalin generic with benefit. She denies any other health changes. Her pain today is 4/10.    Interval History 6/13/2019:  The patient returns to clinic for follow up for bilateral foot pain. She continues to report bilateral foot pain that is burning in nature. She continues to have slow healing wounds to both feet from ulcers. She continues to take Lyrica once daily but reports increased pain. She continues to take Vitamin B12. She denies any other health changes. Her pain today is 8/10.    Interval History 3/13/2019:  The patient returns to clinic today for follow  up. She continues to report bilateral foot pain that is burning and tingling in nature. Her pain is worse with prolonged walking and standing. She continues to have bilateral foot wounds. She reports that these wounds have significantly improved since last visit. She is currently taking Vitamin B12 with benefit. She continues to report benefit with Lyrica. She is currently taking this once daily. She denies any other health changes. Her pain today is 5/10.    Interval History 2/6/2019:  The patient returns to clinic today for follow up. She reports that her bilateral foot wounds have significantly improved since last visit. She does report some significant improvement in her foot pain. She reports intermittent bilateral foot pain especially with prolonged walking. She describes this pain as heavy and tingling in nature. She is no longer taking Celebrex. She is currently taking Lyrica 150 mg BID with benefit. She does report that the Lyrica is expensive for her. She denies any other health changes. Her pain today is 5/10.    Interval History 12/5/18:  Patient returns to clinic today for follow up. She reports that since increasing her medications, she is having significant improvement in pain during the day time. She is currently taking Lyrica 75mg TID and Celebrex 200mg TID. However, she states that the burning  And tingling pain in both her feet increase in the evening around 6 or 7pm. She is unable to sleep during the nights due to the pain. She is still wearing her bilateral boots due to non healing ulcers from her scleroderma.     Interval History 8/30/2018:  The patient returns to clinic today for follow up. She continues to report bilateral foot pain that is burning and tingling in nature. She reports that this pain is worse at night. She is currently taking Lyrica 75 mg BID with limited relief. She did not have any benefit with Mobic. She continues to take Aleve with some benefit. She continues to have  nonhealing ulcers. She wears an ACE bandage to her right calf, as well as boots to her bilateral feet. She denies any other health changes. Her pain today is 7/10.     Interval History 7/11/2018:  Since previous encounter she has weaned from gabapentin to 600mg / day and did not notice significant worsening of pain, but was having somnelence from higher doses of the medication in the past.  We are weaning in an attempt to trial Lyrica as an alternative to gabapentin.  Tramadol causes significant sedation, limiting its use.  She has discontinued the use of the tramadol.  Additionally she does have benefit from anti-inflammatory medication, has been using aleve, has not trialed CANTRELL-2 inhibitors in the past.    Interval history 05/16/2018:      The patient has had x-rays of the lumbar spine which did not reveal any evidence of significant neuroforaminal stenosis with degenerative disc disease.  There is mild facet arthropathy.  She has escalated her gabapentin and is currently taking 2100 mg of gabapentin per day without any noticeable improvement but daytime somnolence.  She continues to have nonhealing ulcers and topical pain cream is helping to a limited degree over her leg in areas where there is no skin disruption.    Initial encounter:    Yamel Shah presents to the clinic for the evaluation of foot pain. The pain started 2 years ago following ulcers and symptoms have been worsening.    Brief history: History of scleroderma    Pain Description:    The pain is located in the both feet in the area of slowly healing chronic foot ulcers which were partly from venous insufficiency and stasis associated with her scleroderma.  In her right lower extremity she describes radicular symptoms in the L4/5 distribution.    At BEST  5/10     At WORST  10/10 on the WORST day.      On average pain is rated as 8/10.     Today the pain is rated as 8/10    The pain is described as burning, sharp, shooting and constant       Symptoms interfere with daily activity, sleeping and work.     Exacerbating factors: Laying, Walking, Night Time, Morning and Getting out of bed/chair.      Mitigating factors medications.     Patient denies night fever/night sweats, urinary incontinence, bowel incontinence, significant weight loss, significant motor weakness and loss of sensations.  Patient denies any suicidal or homicidal ideations    Pain Medications:  Current:  Lyrica 150 mg daily    Tried in Past:  NSAIDs -Aleve, Mobic, Celebrex  TCA -Never  SNRI -Never  Anti-convulsants - Gabapentin, Lyrica  Muscle Relaxants -Never  Opioids-Tramadol    Physical Therapy/Home Exercise: no       report:  Reviewed and consistent with medication use as prescribed.    Pain Procedures: none    Chiropractor -never  Acupuncture - never  TENS unit -never  Spinal decompression -never  Joint replacement -never    Imaging:   Xray Cervical Spine 12/6/2019:  FINDINGS:  Odontoid prevertebral soft tissues and posterior elements are intact.  Neural foramina are patent.  No fracture dislocation bone destruction seen.  There is mild DJD.     Impression:     Mild DJD.    X-ray lumbar spine 6/1/2016:  2 views: Alignment is normal. There is mild DJD. No fracture dislocation bone destruction seen.   Impression      Mild DJD.     Xray lumbar spine 4/2018:  COMPARISON:  June 2016    FINDINGS:  There is slight curvature of the lumbar spine.  The vertebral bodies are normally aligned and normal in height.  Mild disc space narrowing present at L5-S1.  There is mild facet degenerative change in the lower spine.  No significant osteophytic spurring present.  There is no change in alignment with flexion or extension.      Past Medical History:   Diagnosis Date    Abnormal Pap smear     Acid reflux     Allergy     Arthritis     GERD (gastroesophageal reflux disease)     History of migraine headaches     Hypertension     Idiopathic neuropathy 7/20/2012    ILD (interstitial lung  disease) 11/6/2013    Iron deficiency anemia 3/18/2014    MRSA carrier     Osteopenia     Pneumonia     Pulmonary fibrosis     Pulmonary hypertension     Raynaud's disease     Scleroderma, diffuse     Sjogren's syndrome     Vitamin D deficiency 11/14/2013     Past Surgical History:   Procedure Laterality Date    24 HOUR IMPEDANCE PH MONITORING OF ESOPHAGUS IN PATIENT NOT TAKING ACID REDUCING MEDICATIONS N/A 3/4/2020    Procedure: IMPEDANCE PH STUDY, ESOPHAGEAL, 24 HOUR, IN PATIENT NOT TAKING ACID REDUCING MEDICATION;  Surgeon: Annamaria Mendoza MD;  Location: [facility] (4TH FLR);  Service: Endoscopy;  Laterality: N/A;  OFF PPI/H2 Blocker   Motility Studies   Hold Narcotics x 1 days   Hold TCA x 1 days  2/26 - LVM attempting to confirm appt  2/27 - Confirmed appt    BREAST BIOPSY      Left, benign    CERVICAL CONIZATION   W/ LASER  1970    COLONOSCOPY      COLONOSCOPY N/A 3/29/2019    Procedure: COLONOSCOPY;  Surgeon: Annamaria Mendoza MD;  Location: [facility] (2ND FLR);  Service: Endoscopy;  Laterality: N/A;    DILATION AND CURETTAGE OF UTERUS      ESOPHAGEAL MANOMETRY WITH MEASUREMENT OF IMPEDANCE N/A 3/4/2020    Procedure: MANOMETRY, ESOPHAGUS, WITH IMPEDANCE MEASUREMENT;  Surgeon: Annamaria Mendoza MD;  Location: [facility] (4TH FLR);  Service: Endoscopy;  Laterality: N/A;  OFF PPI/H2 Blocker   Motility Studies   Hold Narcotics x 1 days   Hold TCA x 1 days    ESOPHAGOGASTRODUODENOSCOPY      ESOPHAGOGASTRODUODENOSCOPY N/A 3/29/2019    Procedure: EGD (ESOPHAGOGASTRODUODENOSCOPY);  Surgeon: Annamaria Mendoza MD;  Location: [facility] (2ND FLR);  Service: Endoscopy;  Laterality: N/A;  pulmonary htn    HYSTERECTOMY  1990    FRANDY (AUB, Fibroids), ovaries remain     Social History     Socioeconomic History    Marital status:      Spouse name: Lewis    Number of children: 4    Years of education: Not on file    Highest education level: Not on file   Occupational History    Occupation: Center  -retired     Employer: [employer]     Comment[place]     Employer: RETIRED   Social Needs    Financial resource strain: Not very hard    Food insecurity     Worry: Never true     Inability: Never true    Transportation needs     Medical: No     Non-medical: No   Tobacco Use    Smoking status: Never Smoker    Smokeless tobacco: Never Used   Substance and Sexual Activity    Alcohol use: Yes     Alcohol/week: 0.0 standard drinks     Frequency: Monthly or less     Drinks per session: 1 or 2     Binge frequency: Never     Comment: ocasionally    Drug use: No    Sexual activity: Yes     Partners: Male     Birth control/protection: None   Lifestyle    Physical activity     Days per week: 0 days     Minutes per session: 0 min    Stress: Only a little   Relationships    Social connections     Talks on phone: More than three times a week     Gets together: Patient refused     Attends Judaism service: More than 4 times per year     Active member of club or organization: No     Attends meetings of clubs or organizations: Never     Relationship status:    Other Topics Concern    Are you pregnant or think you may be? No    Breast-feeding No   Social History Narrative         Family History   Problem Relation Age of Onset    Breast cancer Mother     No Known Problems Daughter     No Known Problems Son     No Known Problems Daughter     No Known Problems Son     Breast cancer Sister     Breast cancer Maternal Aunt     Osteoarthritis Brother     Melanoma Neg Hx     Colon cancer Neg Hx     Crohn's disease Neg Hx     Stomach cancer Neg Hx     Ulcerative colitis Neg Hx     Rectal cancer Neg Hx     Irritable bowel syndrome Neg Hx     Esophageal cancer Neg Hx     Celiac disease Neg Hx     Ovarian cancer Neg Hx        Review of patient's allergies indicates:   Allergen Reactions    Sulfa (sulfonamide antibiotics)      Other reaction(s): Rash       Current Outpatient  Medications   Medication Sig    albuterol (VENTOLIN HFA) 90 mcg/actuation inhaler Inhale 2 puffs into the lungs every 4 (four) hours as needed for Wheezing. Rescue    carboxymethylcellulose sodium (REFRESH TEARS) 0.5 % Drop Apply 1-2 drops to every as needed    cephALEXin (KEFLEX) 500 MG capsule TK ONE C PO  Q 8 H TAT    ergocalciferol (ERGOCALCIFEROL) 50,000 unit Cap Take 1 capsule (50,000 Units total) by mouth every 7 days.    meloxicam (MOBIC) 7.5 MG tablet Take 1 tablet (7.5 mg total) by mouth once daily.    multivitamin capsule Take 1 capsule by mouth once daily.     mycophenolate mofetil (CELLCEPT) 200 mg/mL SusR Take 3.75 mLs (750 mg total) by mouth 2 (two) times daily.    NIFEdipine (ADALAT CC) 90 MG TbSR TAKE 1 TABLET(90 MG) BY MOUTH EVERY DAY    omeprazole (PRILOSEC) 40 MG capsule TAKE 1 CAPSULE(40 MG) BY MOUTH TWICE DAILY BEFORE MEALS    pravastatin (PRAVACHOL) 20 MG tablet TAKE 1 TABLET BY MOUTH EVERY EVENING    pregabalin (LYRICA) 100 MG capsule Take 1 capsule (100 mg total) by mouth 2 (two) times daily.    PREVIDENT 5000 SENSITIVE 1.1-5 % Pste BRUSH FOR 2 MINUTES TWICE PER DAY    tadalafil (ADCIRCA) 20 mg Tab Take 2 tablets (40 mg total) by mouth once daily.    tiZANidine (ZANAFLEX) 4 MG tablet     traZODone (DESYREL) 50 MG tablet Take 1 tablet (50 mg total) by mouth every evening.    ALPRAZolam (XANAX) 0.5 MG tablet Take 1 tablet by mouth 1 hour  before the EVLT procedure.  You may take a second tablet right before the procedure; please check with our nurse.    conjugated estrogens (PREMARIN) vaginal cream Place 1 g vaginally twice a week. Place pea sized amount to vagina twice per day for two weeks then twice per week therafter     Current Facility-Administered Medications   Medication    lidocaine-EPINEPHrine 1%-1:100,000 30 mL, lidocaine HCL 10 mg/ml (1%) 20 mL, sodium bicarbonate 10 mL in sodium chloride 0.9% 500 mL solution       REVIEW OF SYSTEMS:    GENERAL:  No weight loss,  "malaise or fevers.  HEENT:   No recent changes in vision or hearing  NECK:  Negative for lumps,  difficulty with swallowing associated with scleroderma.  RESPIRATORY:  Negative for cough, wheezing or shortness of breath, patient denies any recent URI. Albuterol (history of ILD)  CARDIOVASCULAR:  Negative for chest pain, leg swelling or palpitations.  GI:  Negative for abdominal discomfort, blood in stools or black stools or change in bowel habits. GERD controlled zantac  MUSCULOSKELETAL:  See HPI.  SKIN:   Chronic low healing venous stasis ulcerations to bilateral lower extremities   PSYCH:  No mood disorder or recent psychosocial stressors.  Patients sleep is not disturbed secondary to pain.  HEMATOLOGY/LYMPHOLOGY:   History of iron deficiency anemia, takes daily iron supplementation.    ENDO: No history of diabetes or thyroid dysfunction  NEURO:   No history of headaches, syncope, paralysis, seizures or tremors.  All other reviewed and negative other than HPI.    OBJECTIVE:      /64   Pulse 83   Temp 98.2 °F (36.8 °C)   Resp 18   Ht 5' 6" (1.676 m)   Wt 79 kg (174 lb 2.6 oz)   SpO2 100%   BMI 28.11 kg/m²     PHYSICAL EXAMINATION:    GENERAL: Well appearing, in no acute distress, alert and oriented x3.  PSYCH:  Mood and affect appropriate.  SKIN: Tight skin associated with scleroderma.   HEAD/FACE:  Normocephalic, atraumatic. Cranial nerves grossly intact.  NECK: There is pain with palpation over cervical paraspinals and trapezius muscle bilaterally. There is pain with palpation over cervical facet joints on the right. Limited ROM with pain on flexion, extension, and lateral rotation on the right.   CV: RRR with palpation of the radial artery.  PULM: No evidence of respiratory difficulty, symmetric chest rise.  BACK:  There is no pain with palpation over the facet joints of the lumbar spine bilaterally. Limited ROM with mild pain on extension. Positive facet loading bilaterally.    EXTREMITIES: " Contractures over on bilateral hands with ulcerations to knuckles, right greater than left.   MUSCULOSKELETAL:  There is no pain to palpation over the greater trochanteric bursa bilaterally.  FABERs test is positive bilaterally.  FADIRs test is negative.   Bilateral lower extremity strength testing limited secondary to pain in the feet.    NEURO: Bilateral lower extremity coordination and muscle stretch reflexes are physiologic and symmetric. Decreased sensation to BLE. Plantar response are downgoing. No clonus.  No loss of sensation is noted.  GAIT: Antalgic, ambulates without assistance         Lab Results   Component Value Date    WBC 3.62 (L) 09/08/2020    HGB 11.7 (L) 09/08/2020    HCT 38.0 09/08/2020    MCV 93 09/08/2020     09/08/2020     BMP  Lab Results   Component Value Date     09/08/2020    K 3.7 09/08/2020     09/08/2020    CO2 23 09/08/2020    BUN 12 09/08/2020    CREATININE 0.7 09/08/2020    CALCIUM 8.5 (L) 09/08/2020    ANIONGAP 7 (L) 09/08/2020    ESTGFRAFRICA >60.0 09/08/2020    EGFRNONAA >60.0 09/08/2020         ASSESSMENT: 69 y.o. year old female with pain, consistent with     Encounter Diagnoses   Name Primary?    Chronic pain disorder Yes    Cutaneous scleroderma     Idiopathic neuropathy     Cervical spondylosis     DDD (degenerative disc disease), cervical     Myofascial pain        PLAN:     - Previous imaging was reviewed and discussed with the patient today.    - Consider cervical facet joint injections in the future. She would like to wait at this time.     - Continue physical therapy.     - Increase Lyrica to 300 mg once daily.     - Continue Zanaflex 4 mg at bedtime. Refill provided today.     - Continue to follow up with wound care. Continue follow up with V[facility].     - RTC in 6 weeks or sooner if needed.     The above plan and management options were discussed at length with patient. Patient is in agreement with the above and verbalized understanding.      Viktoriya Camara, SEAN  11/13/2020

## 2020-11-16 NOTE — PROGRESS NOTES
Subjective:       Patient ID: Yamel Shah is a 69 y.o. female.    Chief Complaint: Follow-up and Medication Refill (Vit D)    HPI   The patient presents for follow-up of medical conditions which include hypertension, scleroderma, skin ulcer of right lower extremities, GERD, and vitamin-D deficiency.    The patient has started physical therapy for her neck.  She is status post right greater saphenous vein EVLT procedure.  She is scheduled to have a 2nd procedure next month.  She is followed by Dr. Claudio of the [facility] Surgery service.  She still notes right lower extremity ulcers on the leg    Review of Systems   Constitutional: Negative for activity change, appetite change, fatigue and unexpected weight change.   Eyes: Negative for visual disturbance.   Respiratory: Negative for cough and shortness of breath.    Cardiovascular: Negative for chest pain, palpitations and leg swelling.   Gastrointestinal: Negative for abdominal pain, blood in stool, constipation and diarrhea.   Genitourinary: Negative for dysuria and hematuria.   Musculoskeletal: Positive for arthralgias, back pain, myalgias and neck pain. Negative for neck stiffness.   Integumentary:  Negative for rash.   Neurological: Negative for dizziness, syncope and headaches.   Psychiatric/Behavioral: Negative for sleep disturbance.         Objective:      Physical Exam  Vitals signs and nursing note reviewed.   Constitutional:       General: She is not in acute distress.     Appearance: She is well-developed.   HENT:      Head: Normocephalic and atraumatic.   Eyes:      General: No scleral icterus.     Conjunctiva/sclera: Conjunctivae normal.   Neck:      Musculoskeletal: Normal range of motion and neck supple.      Thyroid: No thyromegaly.      Vascular: No JVD.   Cardiovascular:      Rate and Rhythm: Normal rate and regular rhythm.      Heart sounds: Normal heart sounds. No murmur. No friction rub. No gallop.    Pulmonary:      Effort: Pulmonary  effort is normal. No respiratory distress.      Breath sounds: Normal breath sounds. No wheezing or rales.   Abdominal:      General: Bowel sounds are normal.      Palpations: Abdomen is soft. There is no mass.      Tenderness: There is no abdominal tenderness.   Musculoskeletal: Normal range of motion.         General: Deformity present. No tenderness.      Comments: The right lower extremity is in a dressing.  This was not removed for today's visit.    No finger ulcers are present.  There is tightening of the skin of both hands.   Lymphadenopathy:      Cervical: No cervical adenopathy.   Skin:     General: Skin is warm and dry.      Findings: No rash.   Neurological:      Mental Status: She is alert and oriented to person, place, and time.         Assessment:       1. Essential hypertension    2. Scleroderma with pulmonary involvement    3. Scleroderma    4. Vitamin D deficiency    5. Skin ulcer of multiple sites of right lower extremity, limited to breakdown of skin    6. Gastroesophageal reflux disease, unspecified whether esophagitis present        Plan:     Yamel was seen today for follow-up and medication refill.  Current therapy will be continued.  Anti reflux measures were discussed.  Vitamin-D level will be obtained today.  The patient should use calcium in a dose of 1158-5206 mg daily.  A follow-up visit in 4 months is recommended.    Diagnoses and all orders for this visit:    Essential hypertension    Scleroderma with pulmonary involvement    Scleroderma    Vitamin D deficiency  -     ergocalciferol (ERGOCALCIFEROL) 50,000 unit Cap; Take 1 capsule (50,000 Units total) by mouth every 7 days.  -     Vitamin D; Future    Skin ulcer of multiple sites of right lower extremity, limited to breakdown of skin    Gastroesophageal reflux disease, unspecified whether esophagitis present

## 2020-11-17 ENCOUNTER — SPECIALTY PHARMACY (OUTPATIENT)
Dept: PHARMACY | Facility: CLINIC | Age: 70
End: 2020-11-17

## 2020-11-17 ENCOUNTER — CLINICAL SUPPORT (OUTPATIENT)
Dept: REHABILITATION | Facility: OTHER | Age: 70
End: 2020-11-17
Attending: NURSE PRACTITIONER
Payer: MEDICARE

## 2020-11-17 DIAGNOSIS — R29.898 WEAKNESS OF BOTH UPPER EXTREMITIES: ICD-10-CM

## 2020-11-17 DIAGNOSIS — R29.3 POSTURE ABNORMALITY: ICD-10-CM

## 2020-11-17 DIAGNOSIS — M54.2 PAINFUL CERVICAL RANGE OF MOTION: ICD-10-CM

## 2020-11-17 PROCEDURE — 97110 THERAPEUTIC EXERCISES: CPT | Mod: PN,CQ

## 2020-11-17 NOTE — TELEPHONE ENCOUNTER
Specialty Pharmacy - Clinical Reassessment  Specialty Pharmacy - Refill Coordination    Specialty Medication Orders Linked to Encounter      Most Recent Value   Medication #1  mycophenolate mofetil (CELLCEPT) 200 mg/mL SusR (Order#[id number], Rx#[id number])        Subjective    Yamel Shah is a 69 y.o. female, who is followed by the specialty pharmacy service for management and education.    Recent Encounters     Date Type Provider Description    11/17/2020 Specialty Pharmacy Patrick Miranda Follow-up Clinical Reassessment; Refill Coordination        Clinical call attempts since last clinical assessment   11/6/2020  4:12 PM - Specialty Pharmacy - Clinical Reassessment by Ruben Chino PharmD  11/11/2020  6:10 PM - Specialty Pharmacy - Clinical Reassessment by Ruben Chino PharmD  11/17/2020  4:55 PM - Specialty Pharmacy - Clinical Reassessment by Ruben Chino PharmD     Today she received follow up education for her specialty medication(s).    Current Outpatient Medications   Medication Sig    albuterol (VENTOLIN HFA) 90 mcg/actuation inhaler Inhale 2 puffs into the lungs every 4 (four) hours as needed for Wheezing. Rescue    ALPRAZolam (XANAX) 0.5 MG tablet Take 1 tablet by mouth 1 hour  before the EVLT procedure.  You may take a second tablet right before the procedure; please check with our nurse.    carboxymethylcellulose sodium (REFRESH TEARS) 0.5 % Drop Apply 1-2 drops to every as needed    cephALEXin (KEFLEX) 500 MG capsule TK ONE C PO  Q 8 H TAT    conjugated estrogens (PREMARIN) vaginal cream Place 1 g vaginally twice a week. Place pea sized amount to vagina twice per day for two weeks then twice per week therafter    ergocalciferol (ERGOCALCIFEROL) 50,000 unit Cap Take 1 capsule (50,000 Units total) by mouth every 7 days.    meloxicam (MOBIC) 7.5 MG tablet Take 1 tablet (7.5 mg total) by mouth once daily.    multivitamin capsule Take 1 capsule by mouth once daily.     mycophenolate mofetil  (CELLCEPT) 200 mg/mL SusR Take 3.75 mLs (750 mg total) by mouth 2 (two) times daily.    NIFEdipine (ADALAT CC) 90 MG TbSR TAKE 1 TABLET(90 MG) BY MOUTH EVERY DAY    omeprazole (PRILOSEC) 40 MG capsule TAKE 1 CAPSULE(40 MG) BY MOUTH TWICE DAILY BEFORE MEALS    pravastatin (PRAVACHOL) 20 MG tablet TAKE 1 TABLET BY MOUTH EVERY EVENING    pregabalin (LYRICA) 300 MG Cap Take 1 capsule (300 mg total) by mouth once daily.    PREVIDENT 5000 SENSITIVE 1.1-5 % Pste BRUSH FOR 2 MINUTES TWICE PER DAY    tadalafil (ADCIRCA) 20 mg Tab Take 2 tablets (40 mg total) by mouth once daily.    tiZANidine (ZANAFLEX) 4 MG tablet Take 1 tablet (4 mg total) by mouth nightly as needed.    traZODone (DESYREL) 50 MG tablet Take 1 tablet (50 mg total) by mouth every evening.   Last reviewed on 11/13/2020  1:21 PM by Viktoriya Camara NP    Review of patient's allergies indicates:   Allergen Reactions    Sulfa (sulfonamide antibiotics)      Other reaction(s): Rash   Last reviewed on  11/13/2020 1:21 PM by Viktoriya Camara    Drug Interactions    Drug interactions evaluated: yes  Clinically relevant drug interactions identified: no  Provided the patient with educational material regarding drug interactions: not applicable       Medication Adherence    Patient reported X missed doses in the last month: 0  Any gaps in refill history greater than 2 weeks in the last 3 months: no  Demonstrates understanding of importance of adherence: yes  Informant: patient  Reliability of informant: reliable  Provider-estimated medication adherence level: good  Reasons for non-adherence: no problems identified  Support network for adherence: family member  Confirmed plan for next specialty medication refill: delivery by pharmacy  Refills needed for supportive medications: not needed         Assessment Questions - Documented Responses      Most Recent Value   Assessment   Medication Reconciliation completed for patient  Yes   During the past 4 weeks, has patient  missed any activities due to condition or medication?  No   During the past 4 weeks, did patient have any of the following urgent care visits?  None   Goals of Therapy Status  Achieving   Welcome packet contents reviewed and discussed with patient?  No   Assesment completed?  Yes   Plan  Therapy continued   Do you need to open a clinical intervention (i-vent)?  No   Do you want to schedule first shipment?  No   Medication #1 Assessment Info   Patient status  Existing medication, Exisiting to [facility]   Is this medication appropriate for the patient?  Yes   Is this medication effective?  Yes        Refill Questions - Documented Responses      Most Recent Value   Relationship to patient of person spoken to?  Self   HIPAA/medical authority confirmed?  Yes   Any changes in contact preferences or allowed representatives?  No   Has the patient had any insurance changes?  No   Has the patient had any changes to specialty medication, dose, or instructions?  No   Has the patient started taking any new medications, herbals, or supplements?  No   Has the patient been diagnosed with any new medical conditions?  No   Does the patient have any new allergies to medications or foods?  No   Does the patient have any concerns about side effects?  No   Can the patient store medication/sharps container properly (at the correct temperature, away from children/pets, etc.)?  Yes   Can the patient call emergency services (911) in the event of an emergency?  Yes   Does the patient have any concerns or questions about taking or administering this medication as prescribed?  No   How many doses did the patient miss in the past 4 weeks or since the last fill?  0   How many doses does the patient have on hand?  5   Does the number of doses/days supply remaining match pharmacy expected amounts?  Yes   Does the patient feel that this medication is effective?  Yes   During the past 4 weeks, has patient missed any activities due to condition or  "medication?  No   During the past 4 weeks, did patient have any of the following urgent care visits?  None   How will the patient receive the medication?  Mail   When does the patient need to receive the medication?  11/20/20   Shipping Address  Home   Address in [facility] confirmed and updated if neccessary?  Yes   Expected Copay ($)  15   Is the patient able to afford the medication copay?  Yes   Payment Method  CC on file   Days supply of Refill  42   Would patient like to speak to a pharmacist?  No   Do you want to trigger an intervention?  No   Do you want to trigger an additional referral task?  No   Refill activity completed?  Yes   Refill activity plan  Refill scheduled   Shipment/Pickup Date:  11/19/20          Objective    She has a past medical history of Abnormal Pap smear, Acid reflux, Allergy, Arthritis, GERD (gastroesophageal reflux disease), History of migraine headaches, Hypertension, Idiopathic neuropathy (7/20/2012), ILD (interstitial lung disease) (11/6/2013), Iron deficiency anemia (3/18/2014), MRSA carrier, Osteopenia, Pneumonia, Pulmonary fibrosis, Pulmonary hypertension, Raynaud's disease, Scleroderma, diffuse, Sjogren's syndrome, and Vitamin D deficiency (11/14/2013).        BP Readings from Last 4 Encounters:   11/13/20 135/64   11/06/20 124/64   10/21/20 (!) 141/65   10/16/20 136/78     Ht Readings from Last 4 Encounters:   11/13/20 5' 6" (1.676 m)   11/06/20 5' 6" (1.676 m)   10/21/20 5' 6" (1.676 m)   10/16/20 5' 6" (1.676 m)     Wt Readings from Last 4 Encounters:   11/13/20 79 kg (174 lb 2.6 oz)   11/06/20 72.4 kg (159 lb 9.8 oz)   10/21/20 72 kg (158 lb 11.7 oz)   10/16/20 73.9 kg (162 lb 14.7 oz)     Recent Labs   Lab Result Units 09/08/20  0934   Creatinine mg/dL 0.7   ALT U/L 9 L   AST U/L 17     The goals of prescribed drug therapy management include:  · Supporting patient to meet the prescriber's medical treatment objectives  · Improving or maintaining quality of " life  · Maintaining optimal therapy adherence  · Minimizing and managing side effects      Goals of Therapy Status: Achieving    Assessment/Plan  Patient plans to continue therapy without changes      Indication, dosage, appropriateness, effectiveness, safety and convenience of her specialty medication(s) were reviewed today.     Patient Counseling    Counseled the patient on the following: doses and administration discussed, safe handling, storage, and disposal discussed, possible adverse effects and management discussed, possible drug and prescription drug interactions discussed, possible drug and OTC drug and food interactions discussed, lab monitoring and follow-up discussed, therapeutic rationale discussed, cost of medications and cost implications discussed, adherence and missed doses discussed, pharmacy contact information discussed       Cellcept refill and follow up were completed on 11/17/20. Pt takes cellcept for Scleroderma and ILD. Pt states that she is doing very well on the medication, and her symptoms have improved. She has not experienced any side effects from Cellcept. Pt's medication list was reviewed. No DDIs. Pt is adherent. She has not missed work or planned activities. Pt denies new allergies, new medications, new conditions, and recent ER / urgent care visits. Pt confirmed shipping of Cellcept on 11/19 for delivery on 11/20. $15.00 copayment in 004 will be charged to the [facility]. Pt had no further questions or concerns.    Tasks added this encounter   No tasks added.   Tasks due within next 3 months   11/6/2020 - Clinical - Follow Up Assesement (365 day)  11/6/2020 - Refill Call     Patrick Miranda  [facility]  [address] [place] [id number]  Phone: [phone]  Fax: [phone]

## 2020-11-17 NOTE — PROGRESS NOTES
"    Physical Therapy Daily Treatment Note     Name: Yamel Shah  Clinic Number: [id number]    Therapy Diagnosis:   Encounter Diagnoses   Name Primary?    Painful cervical range of motion     Weakness of both upper extremities     Posture abnormality      Physician: Viktoriya Camara NP    Visit Date: 11/17/2020    Physician Orders: PT Eval and Treat      Medical Diagnosis from Referral: M47.812 (ICD-10-CM) - Cervical spondylosis     Evaluation Date: 10/26/2020  Authorization Period Expiration: 10/02/2021  Plan of Care Expiration: 12/21/20 (8 weeks)  Reassessment Due: 11/27/20  Visit # / Visits authorized: 5/ 20     Time In: 1107  Time Out: 1150  Total Billable Time: 43 minutes    Precautions: Hx of MRSA; Recent Endovenous laser procedure; Scleroderma with pulmonary involvement; Venous and arterial insufficiencies; chronic pain; Hx of fecal incontinence; Hx of contractures; (See medical problem list). Deformed hands/fingers.         Subjective     Pt reports feeling the same w/ 4/10 pn in neck.  Pt mentioned she feels as if therapy is helping.      She was compliant with home exercise program.  Response to previous treatment: some muscle soreness   Functional change: no change     Prior Level of Function: Unlimited   Current Level of Function: unable to sit to sewe or use computer for a long time; unable to turn or bend head fwd head comfortably  DME owned/used: NA     Pain: Took pain medication today  Current 4/10, worst 8/10, best 3/10   Location: right neck  Description: "Pulling/tearing muscle, some sharp"  Aggravating Factors: Prolonged sitting, neck rotation B; bending head down   Easing Factors: pain medication and heating pad  Disturbed Sleep: No     Pattern of pain questions:  1.  Where is your pain the worst? R side neck  2.  Is your pain constant or intermittent? Constant  3.  Does bending forward make your typical pain worse? Yes  4.  Since the start of your neck pain, has there been a change in " "your bowel or bladder? No  5.  What can't you do now that you use to be able to do? Turn head to either sides, bending neck fwd, sitting for a long time at computer        Pts goals: "Feel better; alleviate pain".       Objective     Yamel received therapeutic exercises to develop/improve posture, lumbar/cervical ROM, strength and muscular endurance for 43 minutes including the following exercises:      Recumbent bike for 5 minutes L 2 to increase blood flow   UT stretch 3x30" B   Scap retractions 20x3"   Seated cervical retractions 3 x 10 x 2-3" supine ( unable to perform seated)   Seated no monies/slouch corrections gtb 3 x 10 3" modified with loops for wrists  Supine chin tucks 20x  Open book (upper trunk/neck rotation SL) 20x ea  Rows otb 20x  modified with loops for wrists  Shld ext  otb 20x -modified with loops for wrists    Yamel received the following manual therapy techniques: Manual traction and Soft tissue Mobilization were applied to the: 00 for 00 minutes, including:  NP    Yamel participated in neuromuscular re-education activities to improve: Posture for 00 minutes. The following activities were included:  NP    Yamel received hot pack for 00 minutes to NP.    Yamel received cold pack for 00 minutes to NP.      Home Exercises Provided and Patient Education Provided     Education provided:   - Pt edu on proper exercise technique.        Written Home Exercises Provided: Patient instructed to cont prior HEP.  Exercises were reviewed and Yamel was able to demonstrate them prior to the end of the session.  Yamel demonstrated good  understanding of the education provided.     See EMR under Patient Instructions for exercises provided 11/6/2020.    Assessment   Pt unable to perform cervical retraction seated w/out extending her back.  Pt displayed increased strength during therex.  Pt cont to need VCs to avoid upper trap activation during therex.  Pt jelly tx well w/ no c/o pn.  "       Yamel is progressing well towards her goals.   Pt prognosis is Good.       Pt will continue to benefit from skilled outpatient physical therapy to address the deficits listed in the problem list box on initial evaluation, provide pt/family education and to maximize pt's level of independence in the home and community environment.     Pt's spiritual, cultural and educational needs considered and pt agreeable to plan of care and goals.     Anticipated Barriers for therapy: Multiple co-morbidities    Short term goals: 4 weeks    1.  Pt will demonstrate  >50%cervical AROM  W/o increasing ERP by more than 1-2 points  2. Pt will initiate home exercise program to improve strength, flexibility, endurance, and neck mobility  to return pt to PLOF   3. Pt will report improved ability to lift and carry, and sustain good posture while performing ADL's and work w/o  Increasing neck pain     Long term goals: 8 weeks  1. Pt will demonstrate >70% of  cervical AROM without increasing  ERP by more than 1 point,  which results in WFL ROM of neck for ease with ADLs and driving, and working/reading  2. Pt will demonstrate reduced pain and improved functional outcomes as reported on the FOTO by reaching a limitation score of  20% or less in order to demonstrate subjective improvement in pt's condition.    3. Pt will demonstrate independence with reducing or controlling symptoms with ther ex, movement, or position independently, able to reduce pain at worst at B shoulders 3-4 points overall on pain scale using strategies taught in therapy  4. Pt will demonstrate independence with the HEP at discharge.  5. Pt will demonstrate B UE MMT strength at 4+/5       Plan   Cont to progress towards goals set by PT.  Work to increase cervical ROM and posture next visit.          Jerel Orona, PTA

## 2020-11-19 ENCOUNTER — CLINICAL SUPPORT (OUTPATIENT)
Dept: REHABILITATION | Facility: OTHER | Age: 70
End: 2020-11-19
Attending: NURSE PRACTITIONER
Payer: MEDICARE

## 2020-11-19 DIAGNOSIS — R29.3 POSTURE ABNORMALITY: ICD-10-CM

## 2020-11-19 DIAGNOSIS — R29.898 WEAKNESS OF BOTH UPPER EXTREMITIES: ICD-10-CM

## 2020-11-19 DIAGNOSIS — M54.2 PAINFUL CERVICAL RANGE OF MOTION: ICD-10-CM

## 2020-11-19 PROCEDURE — 97110 THERAPEUTIC EXERCISES: CPT | Mod: PN,CQ

## 2020-11-19 NOTE — PROGRESS NOTES
"    Physical Therapy Daily Treatment Note     Name: Yamel Shah  Clinic Number: [id number]    Therapy Diagnosis:   Encounter Diagnoses   Name Primary?    Painful cervical range of motion     Weakness of both upper extremities     Posture abnormality      Physician: Viktoriya Camara NP    Visit Date: 11/19/2020    Physician Orders: PT Eval and Treat      Medical Diagnosis from Referral: M47.812 (ICD-10-CM) - Cervical spondylosis     Evaluation Date: 10/26/2020  Authorization Period Expiration: 10/02/2021  Plan of Care Expiration: 12/21/20 (8 weeks)  Reassessment Due: 11/27/20  Visit # / Visits authorized: 6/ 20     Time In: 1246  Time Out: 1334   Total Billable Time: 48 minutes    Precautions: Hx of MRSA; Recent Endovenous laser procedure; Scleroderma with pulmonary involvement; Venous and arterial insufficiencies; chronic pain; Hx of fecal incontinence; Hx of contractures; (See medical problem list). Deformed hands/fingers.         Subjective     Pt states feeling better w/ no c/o pn in neck.     She was compliant with home exercise program.  Response to previous treatment: some muscle soreness   Functional change: no change     Prior Level of Function: Unlimited   Current Level of Function: unable to sit to sewe or use computer for a long time; unable to turn or bend head fwd head comfortably  DME owned/used: NA     Pain: Took pain medication today  Current 4/10, worst 8/10, best 3/10   Location: right neck  Description: "Pulling/tearing muscle, some sharp"  Aggravating Factors: Prolonged sitting, neck rotation B; bending head down   Easing Factors: pain medication and heating pad  Disturbed Sleep: No     Pattern of pain questions:  1.  Where is your pain the worst? R side neck  2.  Is your pain constant or intermittent? Constant  3.  Does bending forward make your typical pain worse? Yes  4.  Since the start of your neck pain, has there been a change in your bowel or bladder? No  5.  What can't you do now " "that you use to be able to do? Turn head to either sides, bending neck fwd, sitting for a long time at computer        Pts goals: "Feel better; alleviate pain".       Objective     Yamel received therapeutic exercises to develop/improve posture, lumbar/cervical ROM, strength and muscular endurance for 48 minutes including the following exercises:      Recumbent bike for 5 minutes L 2 to increase blood flow   UT stretch 3x30" B   Scap retractions 30x3"   Supine cervical retractions 3 x 10 x 3"  ( unable to perform seated)   Supine chin tucks 30x  Open book (upper trunk/neck rotation SL) 20x ea  Seated no monies/slouch corrections gtb 3 x 10 3" modified with loops for wrists  Rows otb 20x  modified with loops for wrists  Shld ext  otb 20x -modified with loops for wrists    Yamel received the following manual therapy techniques: Manual traction and Soft tissue Mobilization were applied to the: 00 for 00 minutes, including:  NP    Yamel participated in neuromuscular re-education activities to improve: Posture for 00 minutes. The following activities were included:  NP    Yamel received hot pack for 00 minutes to NP.    Yamel received cold pack for 00 minutes to NP.      Home Exercises Provided and Patient Education Provided     Education provided:   - Pt edu on proper exercise technique.        Written Home Exercises Provided: Patient instructed to cont prior HEP.  Exercises were reviewed and Yamel was able to demonstrate them prior to the end of the session.  Yamel demonstrated good  understanding of the education provided.     See EMR under Patient Instructions for exercises provided 11/6/2020.    Assessment   Pt displayed improved mobility with cervical retraction.  Pt had no adverse effects from tx.  Pt cont to lack scap control and some cervical ROM.  Pt showed increased muscular endurance during therex.    Yamel is progressing well towards her goals.   Pt prognosis is Good. "       Pt will continue to benefit from skilled outpatient physical therapy to address the deficits listed in the problem list box on initial evaluation, provide pt/family education and to maximize pt's level of independence in the home and community environment.     Pt's spiritual, cultural and educational needs considered and pt agreeable to plan of care and goals.     Anticipated Barriers for therapy: Multiple co-morbidities    Short term goals: 4 weeks    1.  Pt will demonstrate  >50%cervical AROM  W/o increasing ERP by more than 1-2 points  2. Pt will initiate home exercise program to improve strength, flexibility, endurance, and neck mobility  to return pt to PLOF   3. Pt will report improved ability to lift and carry, and sustain good posture while performing ADL's and work w/o  Increasing neck pain     Long term goals: 8 weeks  1. Pt will demonstrate >70% of  cervical AROM without increasing  ERP by more than 1 point,  which results in WFL ROM of neck for ease with ADLs and driving, and working/reading  2. Pt will demonstrate reduced pain and improved functional outcomes as reported on the FOTO by reaching a limitation score of  20% or less in order to demonstrate subjective improvement in pt's condition.    3. Pt will demonstrate independence with reducing or controlling symptoms with ther ex, movement, or position independently, able to reduce pain at worst at B shoulders 3-4 points overall on pain scale using strategies taught in therapy  4. Pt will demonstrate independence with the HEP at discharge.  5. Pt will demonstrate B UE MMT strength at 4+/5       Plan   Cont to progress towards goals set by PT.  Work to increase cervical ROM and posture next visit.          Jerel Orona, PTA

## 2020-11-23 ENCOUNTER — PATIENT MESSAGE (OUTPATIENT)
Dept: GASTROENTEROLOGY | Facility: CLINIC | Age: 70
End: 2020-11-23

## 2020-11-24 ENCOUNTER — OFFICE VISIT (OUTPATIENT)
Dept: RHEUMATOLOGY | Facility: CLINIC | Age: 70
End: 2020-11-24
Payer: MEDICARE

## 2020-11-24 ENCOUNTER — CLINICAL SUPPORT (OUTPATIENT)
Dept: REHABILITATION | Facility: OTHER | Age: 70
End: 2020-11-24
Attending: NURSE PRACTITIONER
Payer: MEDICARE

## 2020-11-24 ENCOUNTER — HOSPITAL ENCOUNTER (OUTPATIENT)
Dept: RADIOLOGY | Facility: HOSPITAL | Age: 70
Discharge: HOME | End: 2020-11-24
Attending: STUDENT IN AN ORGANIZED HEALTH CARE EDUCATION/TRAINING PROGRAM
Payer: MEDICARE

## 2020-11-24 ENCOUNTER — TELEPHONE (OUTPATIENT)
Dept: PULMONOLOGY | Facility: CLINIC | Age: 70
End: 2020-11-24

## 2020-11-24 VITALS
HEIGHT: 66 IN | SYSTOLIC BLOOD PRESSURE: 130 MMHG | WEIGHT: 165 LBS | DIASTOLIC BLOOD PRESSURE: 63 MMHG | BODY MASS INDEX: 26.52 KG/M2 | HEART RATE: 80 BPM

## 2020-11-24 DIAGNOSIS — D64.9 ANEMIA, UNSPECIFIED TYPE: ICD-10-CM

## 2020-11-24 DIAGNOSIS — M34.9 SCLERODERMA: ICD-10-CM

## 2020-11-24 DIAGNOSIS — R29.3 POSTURE ABNORMALITY: ICD-10-CM

## 2020-11-24 DIAGNOSIS — R29.898 WEAKNESS OF BOTH UPPER EXTREMITIES: ICD-10-CM

## 2020-11-24 DIAGNOSIS — M54.2 PAINFUL CERVICAL RANGE OF MOTION: ICD-10-CM

## 2020-11-24 DIAGNOSIS — M34.9 SCLERODERMA: Primary | ICD-10-CM

## 2020-11-24 DIAGNOSIS — K21.9 GASTROESOPHAGEAL REFLUX DISEASE, UNSPECIFIED WHETHER ESOPHAGITIS PRESENT: ICD-10-CM

## 2020-11-24 DIAGNOSIS — L03.114 CELLULITIS OF LEFT UPPER EXTREMITY: ICD-10-CM

## 2020-11-24 DIAGNOSIS — J84.9 ILD (INTERSTITIAL LUNG DISEASE): ICD-10-CM

## 2020-11-24 DIAGNOSIS — I87.2 VENOUS INSUFFICIENCY OF BOTH LOWER EXTREMITIES: ICD-10-CM

## 2020-11-24 DIAGNOSIS — I27.20 PULMONARY HYPERTENSION: ICD-10-CM

## 2020-11-24 PROCEDURE — 73070 X-RAY EXAM OF ELBOW: CPT | Mod: TC,LT

## 2020-11-24 PROCEDURE — 99214 OFFICE O/P EST MOD 30 MIN: CPT | Mod: S$PBB,GC,, | Performed by: STUDENT IN AN ORGANIZED HEALTH CARE EDUCATION/TRAINING PROGRAM

## 2020-11-24 PROCEDURE — 73070 XR ELBOW 2 VIEWS LEFT: ICD-10-PCS | Mod: 26,LT,, | Performed by: RADIOLOGY

## 2020-11-24 PROCEDURE — 73070 X-RAY EXAM OF ELBOW: CPT | Mod: 26,LT,, | Performed by: RADIOLOGY

## 2020-11-24 PROCEDURE — 97110 THERAPEUTIC EXERCISES: CPT | Mod: PN,CQ

## 2020-11-24 PROCEDURE — 99999 PR PBB SHADOW E&M-EST. PATIENT-LVL V: ICD-10-PCS | Mod: PBBFAC,GC,, | Performed by: STUDENT IN AN ORGANIZED HEALTH CARE EDUCATION/TRAINING PROGRAM

## 2020-11-24 PROCEDURE — 99215 OFFICE O/P EST HI 40 MIN: CPT | Mod: PBBFAC,25 | Performed by: STUDENT IN AN ORGANIZED HEALTH CARE EDUCATION/TRAINING PROGRAM

## 2020-11-24 PROCEDURE — 99214 PR OFFICE/OUTPT VISIT, EST, LEVL IV, 30-39 MIN: ICD-10-PCS | Mod: S$PBB,GC,, | Performed by: STUDENT IN AN ORGANIZED HEALTH CARE EDUCATION/TRAINING PROGRAM

## 2020-11-24 PROCEDURE — 99999 PR PBB SHADOW E&M-EST. PATIENT-LVL V: CPT | Mod: PBBFAC,GC,, | Performed by: STUDENT IN AN ORGANIZED HEALTH CARE EDUCATION/TRAINING PROGRAM

## 2020-11-24 RX ORDER — CEPHALEXIN 500 MG/1
500 CAPSULE ORAL EVERY 6 HOURS
Qty: 20 CAPSULE | Refills: 0 | Status: SHIPPED | OUTPATIENT
Start: 2020-11-24 | End: 2020-11-29

## 2020-11-24 NOTE — PROGRESS NOTES
"    Physical Therapy Daily Treatment Note     Name: Yamel Shah  Clinic Number: [id number]    Therapy Diagnosis:   Encounter Diagnoses   Name Primary?    Painful cervical range of motion     Weakness of both upper extremities     Posture abnormality      Physician: Viktoriya Camara NP    Visit Date: 11/24/2020    Physician Orders: PT Eval and Treat      Medical Diagnosis from Referral: M47.812 (ICD-10-CM) - Cervical spondylosis     Evaluation Date: 10/26/2020  Authorization Period Expiration: 10/02/2021  Plan of Care Expiration: 12/21/20 (8 weeks)  Reassessment Due: 11/27/20  Visit # / Visits authorized: 7/ 20     Time In: 1600  Time Out: 1645   Total Billable Time: 45 minutes    Precautions: Hx of MRSA; Recent Endovenous laser procedure; Scleroderma with pulmonary involvement; Venous and arterial insufficiencies; chronic pain; Hx of fecal incontinence; Hx of contractures; (See medical problem list). Deformed hands/fingers.         Subjective     Pt reports w/ no c/o pn in neck currently.      She was compliant with home exercise program.  Response to previous treatment: no adverse effects  Functional change: no change     Prior Level of Function: Unlimited   Current Level of Function: unable to sit to sewe or use computer for a long time; unable to turn or bend head fwd head comfortably  DME owned/used: NA     Pain: Took pain medication today  Current 4/10, worst 8/10, best 3/10   Location: right neck  Description: "Pulling/tearing muscle, some sharp"  Aggravating Factors: Prolonged sitting, neck rotation B; bending head down   Easing Factors: pain medication and heating pad  Disturbed Sleep: No     Pattern of pain questions:  1.  Where is your pain the worst? R side neck  2.  Is your pain constant or intermittent? Constant  3.  Does bending forward make your typical pain worse? Yes  4.  Since the start of your neck pain, has there been a change in your bowel or bladder? No  5.  What can't you do now that " "you use to be able to do? Turn head to either sides, bending neck fwd, sitting for a long time at computer        Pts goals: "Feel better; alleviate pain".       Objective     Yamel received therapeutic exercises to develop/improve posture, lumbar/cervical ROM, strength and muscular endurance for 45 minutes including the following exercises:      Recumbent bike for 5 minutes L 2 to increase blood flow   UT stretch 3x30" B   Scap retractions 30x5"   Supine cervical retractions 2 x 15 x 3"  ( unable to perform seated)   Supine chin tucks 30x  Supine cervical rotations 2 x 10 B   Open book (upper trunk/neck rotation SL) 20x ea  Seated no monies/slouch corrections gtb 3 x 10 3" modified with loops for wrists  Rows otb 20x  modified with loops for wrists  Shld ext  otb 20x -modified with loops for wrists    Yamel received the following manual therapy techniques: Manual traction and Soft tissue Mobilization were applied to the: 00 for 00 minutes, including:  NP    Yamel participated in neuromuscular re-education activities to improve: Posture for 00 minutes. The following activities were included:  NP    Yamel received hot pack for 00 minutes to NP.    Yamel received cold pack for 00 minutes to NP.      Home Exercises Provided and Patient Education Provided     Education provided:   - Pt edu on proper exercise technique.        Written Home Exercises Provided: Patient instructed to cont prior HEP.  Exercises were reviewed and Yamel was able to demonstrate them prior to the end of the session.  Yamel demonstrated good  understanding of the education provided.     See EMR under Patient Instructions for exercises provided 11/6/2020.    Assessment     Pt able to jelly cervical rotation in supine.  Cervical ROM cont to be somewhat limited.  Pt showed increased strength and endurance during therex.    Yamel is progressing well towards her goals.   Pt prognosis is Good.       Pt will continue to " benefit from skilled outpatient physical therapy to address the deficits listed in the problem list box on initial evaluation, provide pt/family education and to maximize pt's level of independence in the home and community environment.     Pt's spiritual, cultural and educational needs considered and pt agreeable to plan of care and goals.     Anticipated Barriers for therapy: Multiple co-morbidities    Short term goals: 4 weeks    1.  Pt will demonstrate  >50%cervical AROM  W/o increasing ERP by more than 1-2 points  2. Pt will initiate home exercise program to improve strength, flexibility, endurance, and neck mobility  to return pt to PLOF   3. Pt will report improved ability to lift and carry, and sustain good posture while performing ADL's and work w/o  Increasing neck pain     Long term goals: 8 weeks  1. Pt will demonstrate >70% of  cervical AROM without increasing  ERP by more than 1 point,  which results in WFL ROM of neck for ease with ADLs and driving, and working/reading  2. Pt will demonstrate reduced pain and improved functional outcomes as reported on the FOTO by reaching a limitation score of  20% or less in order to demonstrate subjective improvement in pt's condition.    3. Pt will demonstrate independence with reducing or controlling symptoms with ther ex, movement, or position independently, able to reduce pain at worst at B shoulders 3-4 points overall on pain scale using strategies taught in therapy  4. Pt will demonstrate independence with the HEP at discharge.  5. Pt will demonstrate B UE MMT strength at 4+/5       Plan   Cont to progress towards goals set by PT.  Work to increase cervical ROM and posture next visit.          Jerel Orona, PTA

## 2020-11-24 NOTE — PROGRESS NOTES
"Subjective:       Patient ID: Yamel Shah is a 70 y.o. female.    Chief Complaint: F/u for systemic scleroderma disease    Follow up for Systemic scleroderma. Last seen by Dr. Ahuja and myself on 8/11/2020. On Mycophenolate since 6/2019.    Interval Hx: Since last visit, pt states she is doing well. Last visit increased to Mycophenolate 750 mg BID (3.75 mls BID), no recent infections. States she feels her skin is looser and sees more wrinkles. LE swelling and ulcers stable, only 2 ulcers remaining on R ankle and forefoot.     PULM: Saw Dr. Leigh on 6/1: no progression of disease, no need for OFEV. She suspects that her occasional acute respiratory symptoms are related to her GERD. Repeat PFTs in Sept 2020 stable FVC, slight decrease TLC, but decreased DLCO. Needs to see pulmonary again.    HRT TX: Was following with Dr. Lim who has left. Needs referral to see another doctor. Echo done with stable Pulm HTN 40.  ENT: for tinnitus, stopped aldactone. Tinnitus not improved, and pt remains off diuretic, Dr. Lim was aware.   ORTHO: Dr. Phelps hand sx for customized splint in the past, but she wants 2nd opinion.   PAIN CLINIC: on Lyrica 300 mg daily and Zanaflex 4 mg qHS.  GI: Follows with GI Dr. Mendoza, Patient had 24 hr pH impedance test and Esophageal Manometry on 3/4/20. Showed "her gastric emptying scan shows significant delay if emptying of food from the stomach.  she should eat six small, low fat and low fiber meals per day."    LABS:  Recent labs with leukopenia WBC 3.62 but normal ANC and lymphocyte count. Anemia hgb 11.7, anemia labs never scheduled. Wnl CMP, UA, UPCR. CRP 7.3 > 11.6 and ESR 45>43>45 always elevated. Monitors BP at home, well controlled today.  DXA osteopenia, on Vit D supplements and dietary Ca    Today, pt endorses increased acid reflux, occurs daily ~3-4AM. Follows with GI Dr. Mendoza on Prilosec 40 mg BID. Pt has messaged her and was told there is no available appts until " "January. Also, with LE edema and no longer on diuretic. Pt to discuss with hrt tx. Also with L elbow possible infection, states daily pus drainage from area. None today to culture. Has taken Keflex in the past without issue. Sulfa allergy.          Initial Hx:   67YF with Systemic scleroderma, diagnosed 1983- She has been seeing Dr. Ahuja for over 10 years.( +SSA, +SCL-70, -RNAP3, ILD). She has stable ILD on imaging 9/2015 and had mild exercise induced pulmonary hypertension in 2013 that has resolved on 6/2015 RHC and most recent ECHO done 3/2019 shows normal EF with grade 2 diastolic dysfunction, increased sPAP 41 compared to 28mmHg in 06/2018. PFTs 4/2016 showed normal DLCO, RV, FEV1 with mild restriction. Repeat PFTs 03/2018 show normal DLco, decreased RV (89-->64), SVC remains the same at 70 and FVC 70. Repeat PFTs 03/2019 showed FVC 60, SVC 64, RV 61, DLco 82.      03/2019 CT chest/abd was done which showed stable appearing chronic interstitial lung disease consistent with patient's known history of scleroderma; there is new tree-in-bud opacity noted in the right upper lobe which while nonspecific can be seen in the setting of inflammatory infectious processes such as aspiration. Pt was referred to Dr Barry for evaluation of ILD.   As per Dr Barry note " Patient with significant scleroderma multiorgan involvement, Her PFTs reveal trend towards decrease in FVC,A 6MWT is not available at this time,Patient does have poor exercise function because of pain in the feet when she ambulates.  - She may benefit from starting on Immunosuppression with mycophenolate, although risks and benefit need to be reviewed further since she has had scleroderma since 1983. We initiated and provided the patient with basic information on the medication. She is going to review with Dr. Ahuja as well.   - Recommend to repeat PFTs w/DLCO and 6MWT (If patient can tolerate) every 6 months. May enroll in Pulmonary rehab once leg " "ulcers improve.     10/28 6 MWT : Six minute walk distance is 415.14 meters (1362 feet) with moderate dyspnea. During exercise, there was no desaturation while breathing room air. Both blood pressure and heart rate remained stable with walking. The patient reported non-pulmonary symptoms during exercise. Since the previous study in September 2019, exercise capacity is significantly improved. Based upon age and body mass index, exercise capacity is normal.    Seen by Dr Lim 03/2019 as per note "Given appearance of diastolic dysfxn on echo today and uncontrolled HTN/swelling, will go ahead and add aldactone 25mg daily today- needs BMP/BNP this am and again in 1 wk,no PH medicine changes today. From a PH standpoint, based on her echo and last RHC she appears to be doing very well- PFTs to be repeated per Dr Ahuja, chest imaging stable"     She had seen Vascular surgery in 03/2018 who will consider EVLT once ulcers have healed.  Pt is currently on nifedipine 90mg, pravastatin 20mg and adcirca 40mg     Recently retired in Dec 2018, worked as a .     Review of Systems   Constitutional: Negative for chills, fatigue, fever and unexpected weight change.   HENT: Negative for hearing loss, mouth sores and trouble swallowing.    Eyes: Negative for photophobia, pain, redness and visual disturbance.   Respiratory: Negative for cough and shortness of breath.    Cardiovascular: Negative for chest pain.   Gastrointestinal: Negative for abdominal pain, constipation, diarrhea and vomiting.        Reflux   Genitourinary: Negative for dysuria and genital sores.   Musculoskeletal: Negative for arthralgias, back pain, gait problem, joint swelling and myalgias.   Skin: Positive for wound (chronic LE improving). Negative for color change and rash.   Neurological: Negative for weakness and headaches.   Hematological: Does not bruise/bleed easily.   Psychiatric/Behavioral: Negative for agitation. The patient is not " "nervous/anxious.          Objective:   /63   Pulse 80   Ht 5' 6" (1.676 m)   Wt 74.8 kg (165 lb)   BMI 26.63 kg/m²      Physical Exam   Vitals reviewed.  Constitutional: She is oriented to person, place, and time and well-developed, well-nourished, and in no distress. No distress.   HENT:   Head: Normocephalic and atraumatic.   Right Ear: External ear normal.   Left Ear: External ear normal.   Nose: Nose normal.   Mouth/Throat: Oropharynx is clear and moist. No oropharyngeal exudate.   Eyes: Conjunctivae and EOM are normal. Pupils are equal, round, and reactive to light. Right eye exhibits no discharge. Left eye exhibits no discharge. No scleral icterus.   Neck: Neck supple. No thyromegaly present.   Cardiovascular: Normal rate, regular rhythm, normal heart sounds and intact distal pulses.    No murmur heard.  Pulmonary/Chest: Effort normal and breath sounds normal. No respiratory distress. She has no wheezes. She exhibits no tenderness.   Crackles b/l stable from prior exam   Abdominal: Soft. Bowel sounds are normal. She exhibits no distension. There is no abdominal tenderness. There is no guarding.   Neurological: She is alert and oriented to person, place, and time.   Skin: Skin is warm and dry. No rash noted. She is not diaphoretic. No erythema.     Psychiatric: Mood and affect normal.   Musculoskeletal: Normal range of motion. Deformity present. No tenderness or edema.      Comments: Please see pics from prior visit.  Skin tightness  Claw hand deformity, flexion contractures b/l  R LE bandaged and covered today, improving. See pics form prior  Skin score: 34  LE ulcers improving overall but still w/2 ulcers and pitting edema                 SPEP 1/10/2020  Increased total protein.   Increased gamma globulin, polyclonal.      JASON 1/10/2020  No monoclonal peaks identified.      US Venous 06/2019  Impression:   Right Leg:  Color flow evaluation of the lower extremity demonstrates fully compressible and " patent veins; however, due to heavy pitting edema, one of the paired peroneal veins cannot be appreciated which could suggest chronic occlusive thrombus versus   poor imaging. There is no evidence of venous thrombosis in the remaining deep or superficial veins.  Significant reflux is noted in the GSV including the saphenofemoral junction (SFJ) and the SSV including the saphenopopliteal junction (SPJ). There is no   evidence of reflux in the Accessory vein. Many branches are noted throughout the entire course of the GSV and SSV.  Incidental finding of significant reflux is noted in the FV and POP V.    Left Leg:  Color flow evaluation of the lower extremity demonstrates chronic occlusive thrombus in a small segment of the GSV BK. There is no evidence of venous thrombosis in the remaining deep or superficial veins.  Significant reflux is noted in the   GSV including the saphenofemoral junction (SFJ) and the SSV including the saphenopopliteal junction (SPJ). There is no evidence of reflux in the Accessory vein. Noted is a diameter decrease in the GSV from 4.8 mm to 2.6 mm at the level of the distal   thigh and 1.9 mm at the knee.  Multiple branches of the GSV are also noted throughout its entire course. Incidental finding of significant reflux is noted in the FV and POP V.     EGD 01/2017  Normal esophagus. Two biopsies were obtained in                         the upper third of the esophagus.                        - Z-line irregular. Biopsied.                        - Esophageal polyp(s) were found. Resected and                         retrieved.                        - Normal stomach.                        - Congested duodenal mucosa. Biopsied.  Recommendation:  - Await pathology results.                        - Discharge patient to home (ambulatory).                        - Resume previous diet.                        - Continue present medications.                        - Repeat the upper endoscopy in 6  months to assure                         complete removal of polyp at GE junction.        EGD 03/2019  Impression:    - Dilation in the entire esophagus.                        - Z-line irregular, 38 cm from the incisors.                         Biopsied.                        - 3 cm hiatal hernia.                        - Benign-appearing esophageal stenosis.                        - Normal stomach.                        - A few gastric polyps. Biopsied.                        - Normal examined duodenum. Biopsied.  Recommendation:  - Await pathology results.                        - Discharge patient to home (with escort).                        - Resume previous diet.     C-scope 03/2019  Impression:     - Five 2 to 5 mm polyps in the transverse colon,                         removed with a jumbo cold forceps. Resected and                         retrieved.                        - The entire examined colon is normal.                        - The distal rectum and anal verge are normal on                         retroflexion view.                        - The examined portion of the ileum was normal.  Recommendation:    - Discharge patient to home (with escort).     CT chest/abd 03/2019                   Impression         Stable appearing chronic interstitial lung disease consistent with patient's known history of scleroderma; it is worth noting that there is new tree-in-bud opacity noted in the right upper lobe which while nonspecific can be seen in the setting of inflammatory infectious processes such as aspiration.    Persistently fluid-filled esophagus which can place the patient at risk for aspiration         DEXA 09/2018  Osteopenia. There is a 11.9% risk of a major osteoporotic fracture and a 2.6% risk of hip fracture in the next 10 years (FRAX).  Compared with previous DXA, BMD at the lumbar spine has remained stable, and the BMD at the total hip has decreased by -7.4%     2D ECHO 03/2019  · Normal left  ventricular systolic function. The estimated ejection fraction is 60%  · Grade II (moderate) left ventricular diastolic dysfunction consistent with pseudonormalization.  · Elevated left atrial pressure.  · No wall motion abnormalities.  · Normal right ventricular systolic function.  · Mild left atrial enlargement.  · Mild mitral regurgitation.  · The estimated PA systolic pressure is 41 mm Hg  · Normal central venous pressure (3 mm Hg).     CT chest 09/2015  Findings:  Examination of the vascular and soft tissue structures at the base of the neck is unremarkable.  The thoracic aorta maintains normal caliber, contour, and course without significant atherosclerotic calcification within its course.  The heart is not enlarged and there is no evidence of pericardial effusion. The esophagus is mildly dilated and   retained pneumatized secretions are identified within the dependent portion of the esophagus placing the patient at risk for aspiration and also suggestive esophageal dysmotility associated with scleroderma or reflux. There is no axillary, mediastinal,   or hilar lymphadenopathy.    A benign appearing but enlarged left axillary lymph node is identified, which appears stable to the prior examination dated 11/27/07.  Scattered normal sized and benign appearing mediastinal lymphadenopathy is also appreciated, also grossly similar to   the prior examination.     The lungs again are symmetrically expanded and demonstrate chronic generalized lung disease with small opacities and reticulations in a peripheral predominance.  This again is most notable in the lung bases were there is dilatation of small airways and faint groundglass opacity.  Findings appear grossly stable to the prior chest CT in 2007 and are consistent with interstitial lung disease as can be seen with scleroderma, pulmonary fibrosis or interstitial pneumonitis.  No focal mass lesion is identified.Limited views of the abdomen demonstrate nothing  unusual on this noncontrast examination.  The osseous structures demonstrate mild degenerative changes and a stable appearing compression deformity of the T9 vertebral body.       PFTs       FVC     SVC      RV     DLco     1/10/20    74                                72  3/12/19    60         64         61      82  3/6/18      64         70         64      112  4/8/16      70         70         89       84  9/4/15      66         62         72       71  1/19/15    66           2/14/14    64                                 87         Assessment:       1. Scleroderma    2. Pulmonary hypertension    3. ILD (interstitial lung disease)    4. Venous insufficiency of both lower extremities    5. Gastroesophageal reflux disease, unspecified whether esophagitis present    6. Anemia, unspecified type    7. Cellulitis of left upper extremity          Plan:       Problem List Items Addressed This Visit        Pulmonary    ILD (interstitial lung disease)    Relevant Orders    Ambulatory referral/consult to Pulmonology       Cardiac/Vascular    Venous insufficiency of both lower extremities       GI    GERD (gastroesophageal reflux disease)    Relevant Orders    Ambulatory referral/consult to Gastroenterology      Other Visit Diagnoses     Scleroderma    -  Primary    Relevant Orders    X-Ray Elbow 2 Views Left    HME - OTHER    Pulmonary hypertension        Relevant Orders    Ambulatory referral/consult to Transplant, Heart    Anemia, unspecified type        Relevant Orders    CBC Auto Differential    Cellulitis of left upper extremity        Relevant Medications    cephALEXin (KEFLEX) 500 MG capsule        Follow up for Systemic scleroderma. Last seen by Dr. Ahuja and myself on 5/5/2020. On Mycophenolate since 6/2019.  On Mycophenolate 750 mg BID (3.75 mls BID), no recent infections.      PULM: Saw Dr. Leigh on 6/1: no progression of disease, no need for OFEV. She suspects that her occasional acute respiratory symptoms are  "related to her GERD. Repeat PFTs in Sept 2020 stable FVC, slight decrease TLC, but decreased DLCO. Needs to see pulmonary again.    HRT TX: Was following with Dr. Lim who has left. Needs referral to see another doctor. Echo done with stable Pulm HTN 40.  ENT: for tinnitus, stopped aldactone. Tinnitus not improved, and pt remains off diuretic, Dr. Lim was aware.   ORTHO: Dr. Phelps hand sx for customized splint in the past, but she wants 2nd opinion.   PAIN CLINIC: on Lyrica 300 mg daily and Zanaflex 4 mg qHS.  GI: Follows with GI Dr. Mendoza, Patient had 24 hr pH impedance test and Esophageal Manometry on 3/4/20. Showed "her gastric emptying scan shows significant delay if emptying of food from the stomach.  she should eat six small, low fat and low fiber meals per day."    LABS:  Recent labs with leukopenia WBC 3.62 but normal ANC and lymphocyte count. Anemia hgb 11.7, anemia labs never scheduled. Wnl CMP, UA, UPCR. CRP 7.3 > 11.6 and ESR 45>43>45 always elevated. Monitors BP at home, well controlled today.  DXA osteopenia, on Vit D supplements and dietary Ca    - Ambulatory referral/consult to Transplant, Heart; Future  - Ambulatory referral/consult to Pulmonology; Future  - Ambulatory referral/consult to Gastroenterology; Future  - CBC Auto Differential; Future  - X-Ray Elbow 2 Views Left; Future  - HME - OTHER  - cephALEXin (KEFLEX) 500 MG capsule; Take 1 capsule (500 mg total) by mouth every 6 (six) hours. for 5 days  Dispense: 20 capsule; Refill: 0      Plan:  - Reviewed recent CBC, CMP, ESR, CRP. Inflammatory markers with persistently elevated ESR and CRP. wnl UA and UPCR.  - Continue Mycophenolate 750 mg BID (3.75 ml BID). Discussed importance of taking medication.  - Check anemia work up previously ordered per Dr. Ahuja. Check lipid panel at next appt when fasting.  - Discussed to hold mycophenolate for any infections or antibiotic use. Current L elbow cellulitis, Keflex prescription sent. Hold " Mycophenolate until infection resolved. If no improvement, pt to discuss with PCP about infection. Will check L elbow xray.  - referral to pulmonary (was seeing Dr. Leigh) for ILD and referral to heart tx for Pulm HTN (former pt of Dr. Lim)  - Cont to f/u with vascular surgery Dr. Claudio  - Cont to f/u with wound clinic  - Cont to f/u with GI Dr. Mendoza as needed. No appt available soon so referral to GI sent for sooner appt. Information on low fiber diet and DASH diet sent to pt via portal prior visit.  - Cont to f/u with ENT as needed  - pt to monitor BP at home and message PCP if consistently elevated > 120/80  - PT/OT for deformity of hands and hand splint. DME elbow pad prescription sent.  - UTD vaccines    Discussed with Dr. Ahuja. RTC 3 months with standing labs prior.     Gretchen Madison,   Rheumatology, PGY5

## 2020-11-24 NOTE — PROGRESS NOTES
Pre chart    Answers for HPI/ROS submitted by the patient on 11/23/2020   fever: No  eye redness: No  mouth sores: No  headaches: No  shortness of breath: No  chest pain: No  trouble swallowing: Yes  diarrhea: No  constipation: No  unexpected weight change: No  genital sore: No  dysuria: No  During the last 3 days, have you had a skin rash?: No  Bruises or bleeds easily: No  cough: No

## 2020-11-25 DIAGNOSIS — R06.82 TACHYPNEA: ICD-10-CM

## 2020-11-25 DIAGNOSIS — Z79.899 POLYPHARMACY: Primary | ICD-10-CM

## 2020-11-25 DIAGNOSIS — E83.51 HYPOCALCEMIA: Primary | ICD-10-CM

## 2020-11-25 NOTE — TELEPHONE ENCOUNTER
PLease let her know that this is related to her gastroparesis and we should concentrate of optimizing gastroparesis. She should see our Gi dietitian as soon as possible for GP diet. PLs mail her GP diet handout. She should avoid eating anything for three hours prior to bedtime. She should start consuming significantly more food in liquid form (high protein shakes, soups) as these travel down the gi tract more easily.  IT may be heplful to switch to liquid meal for dinner so that her stomach is empty before bedtime. Schedule fu w me as soon as one is available

## 2020-11-27 ENCOUNTER — PATIENT OUTREACH (OUTPATIENT)
Dept: OTHER | Facility: OTHER | Age: 70
End: 2020-11-27

## 2020-11-27 NOTE — PROGRESS NOTES
"Digital Medicine: Health  Follow-Up    The history is provided by the patient.             Reason for review: Blood pressure not at goal        Topics Covered on Call: physical activity and Diet    Additional Follow-up details: Patient reports she is well, no complaints. Denies symptoms of hypertension- HA, chest pains and SOB. Reports she feels tired sometimes, but denies feeling lightheaded or dizzy. Reports she was on her feet all day yesterday cooking for Thanksgiving, resting today.     Discussed COVID-19 and importance of proper hand hygiene and social distancing.     Patient denies further needs from the program today.               Diet-Change      Dietary Improvements:Stopped drinking coffee since last call, switched to tea. Reports she is still trying not to cook with salt, but did eat his son's gumbo yesterday. Reports her  says her food tastes like "hospital food" due to low sodium content              Physical Activity-Change      She exercises for 45 minutes per day     Additional physical activity details: Reports she has been going to physical therapy, for her neck.       Medication Adherence-Medication adherence was assessed.        Substance, Sleep, Stress-No change  stress-assessed  Details:reports she is worried about her blood pressure sometimes, likely causing elevations  Intervention(s):    Sleep-assessed  Details:reports she deals with acid reflux around 3/4am every night, interupts her sleep. going to see gastrologist to discuss this next week.   Intervention(s):    Alcohol -  Details:  Intervention(s):    Tobacco-  Details:  Intervention(s):          Continue current diet/physical activity routine.  Provided patient education.  Reviewed Device Techniques.     Addressed patient questions and patient has my contact information if needed prior to next outreach. Patient verbalizes understanding.      Explained the importance of self-monitoring and medication adherence. Encouraged " the patient to communicate with their health  for lifestyle modifications to help improve or maintain a healthy lifestyle.               There are no preventive care reminders to display for this patient.      Last 5 Patient Entered Readings                                      Current 30 Day Average: 141/64     Recent Readings 11/23/2020 11/23/2020 11/18/2020 11/12/2020 11/10/2020    SBP (mmHg) 145 147 138 137 131    DBP (mmHg) 63 67 65 66 61    Pulse 75 64 65 63 71

## 2020-11-30 ENCOUNTER — CLINICAL SUPPORT (OUTPATIENT)
Dept: GASTROENTEROLOGY | Facility: CLINIC | Age: 70
End: 2020-11-30
Payer: MEDICARE

## 2020-11-30 DIAGNOSIS — R13.10 DYSPHAGIA, UNSPECIFIED TYPE: ICD-10-CM

## 2020-11-30 DIAGNOSIS — M34.9 SCLERODERMA WITH PULMONARY INVOLVEMENT: ICD-10-CM

## 2020-11-30 DIAGNOSIS — K21.9 GASTROESOPHAGEAL REFLUX DISEASE, UNSPECIFIED WHETHER ESOPHAGITIS PRESENT: ICD-10-CM

## 2020-11-30 DIAGNOSIS — R13.14 PHARYNGOESOPHAGEAL DYSPHAGIA: Primary | ICD-10-CM

## 2020-11-30 PROCEDURE — 98968 PR NONPHYSICIAN TELEPHONE ASSESSMENT 21-30 MIN: ICD-10-PCS | Mod: 95,,, | Performed by: DIETITIAN, REGISTERED

## 2020-11-30 PROCEDURE — 98968 PH1 ASSMT&MGMT NQHP 21-30: CPT | Mod: 95,,, | Performed by: DIETITIAN, REGISTERED

## 2020-11-30 NOTE — PROGRESS NOTES
Virtual Visit   The patient location is: home   The chief complaint leading to consultation is: gastroparesis ; dysphagia   Visit type: Virtual visit with synchronous audio and video  Total time spent with patient: 60 minutes   Each patient to whom he or she provides medical services by telemedicine is:  (1) informed of the relationship between the physician and patient and the respective role of any other health care provider with respect to management of the patient; and (2) notified that he or she may decline to receive medical services by telemedicine and may withdraw from such care at any time.        GI NUTRITIONAL ASSESSMENT  Referring Provider: Dr. Kelsie Shah is a 70 y.o. female referred regarding the following problems:  Reason for Visit: Nutrition assessment and recommendations related to:   Chief Complaint   Patient presents with    Gastroesophageal Reflux     HOB elevated ; nocturnal Reflux     Dysphagia with low moisture foods     Water not tolerated as well as milk viscosity , mixed texture trigger coughing     Dry mouth and missing teeth     difficulty masticating foods mayte meat and vegetables     MARY ? snoring /reflux    Gastroparesis     infrequent nausea/gassiness        Symptoms: swallowing problems with low moisture food( bread, rice, meat)  ; Reflux with high fat foods  Patient Nutrition Goals : ease of swallowing foods; absence of reflux  Medical/Surgical History  Pertinent Social History:  Social History     Tobacco Use    Smoking status: Never Smoker    Smokeless tobacco: Never Used   Substance Use Topics    Alcohol use: Yes     Alcohol/week: 0.0 standard drinks     Frequency: Monthly or less     Drinks per session: 1 or 2     Binge frequency: Never     Comment: ocasionally    Drug use: No        Previous Medical History:  Past Medical History:   Diagnosis Date    Abnormal Pap smear     Acid reflux     Allergy     Arthritis     GERD (gastroesophageal  reflux disease)     History of migraine headaches     Hypertension     Idiopathic neuropathy 7/20/2012    ILD (interstitial lung disease) 11/6/2013    Iron deficiency anemia 3/18/2014    MRSA carrier     Osteopenia     Pneumonia     Pulmonary fibrosis     Pulmonary hypertension     Raynaud's disease     Scleroderma, diffuse     Sjogren's syndrome     Vitamin D deficiency 11/14/2013       Previous Surgical History:  Past Surgical History:   Procedure Laterality Date    24 HOUR IMPEDANCE PH MONITORING OF ESOPHAGUS IN PATIENT NOT TAKING ACID REDUCING MEDICATIONS N/A 3/4/2020    Procedure: IMPEDANCE PH STUDY, ESOPHAGEAL, 24 HOUR, IN PATIENT NOT TAKING ACID REDUCING MEDICATION;  Surgeon: Annamaria Mendoza MD;  Location: [facility] (4TH FLR);  Service: Endoscopy;  Laterality: N/A;  OFF PPI/H2 Blocker   Motility Studies   Hold Narcotics x 1 days   Hold TCA x 1 days  2/26 - LVM attempting to confirm appt  2/27 - Confirmed appt    BREAST BIOPSY      Left, benign    CERVICAL CONIZATION   W/ LASER  1970    COLONOSCOPY      COLONOSCOPY N/A 3/29/2019    Procedure: COLONOSCOPY;  Surgeon: Annamaria Mendoza MD;  Location: [facility] (2ND FLR);  Service: Endoscopy;  Laterality: N/A;    DILATION AND CURETTAGE OF UTERUS      ESOPHAGEAL MANOMETRY WITH MEASUREMENT OF IMPEDANCE N/A 3/4/2020    Procedure: MANOMETRY, ESOPHAGUS, WITH IMPEDANCE MEASUREMENT;  Surgeon: Annamaria Mendoza MD;  Location: [facility] (4TH FLR);  Service: Endoscopy;  Laterality: N/A;  OFF PPI/H2 Blocker   Motility Studies   Hold Narcotics x 1 days   Hold TCA x 1 days    ESOPHAGOGASTRODUODENOSCOPY      ESOPHAGOGASTRODUODENOSCOPY N/A 3/29/2019    Procedure: EGD (ESOPHAGOGASTRODUODENOSCOPY);  Surgeon: Annamaria Mendoza MD;  Location: [facility] (2ND FLR);  Service: Endoscopy;  Laterality: N/A;  pulmonary htn    HYSTERECTOMY  1990    FRANDY (AUB, Fibroids), ovaries remain      Anthropometric Data Usual Weight: 155#   has increased 10# pounds over last year   "  Patient perplexed because intake has not increased and in fact- intake of animal protein has decreased due to dental changes and difficulty chewing   Estimated body mass index is 26.63 kg/m² as calculated from the following:    Height as of 11/24/20: 5' 6" (1.676 m).    Weight as of 11/24/20: 74.8 kg (165 lb).  Weight History:  Wt Readings from Last 12 Encounters:   11/24/20 74.8 kg (165 lb)   11/13/20 79 kg (174 lb 2.6 oz)   11/06/20 72.4 kg (159 lb 9.8 oz)   10/21/20 72 kg (158 lb 11.7 oz)   10/16/20 73.9 kg (162 lb 14.7 oz)   10/06/20 71.6 kg (157 lb 13.6 oz)   10/02/20 70 kg (154 lb 5.2 oz)   09/18/20 70.8 kg (156 lb)   09/08/20 70.8 kg (156 lb 1.4 oz)   08/26/20 70.4 kg (155 lb 3.3 oz)   08/17/20 70 kg (154 lb 5.2 oz)   08/11/20 70.5 kg (155 lb 6.8 oz)   ]  BMI: There is no height or weight on file to calculate BMI.  Calculated calorie needs : 1500 calories ( Msj x 1.2)   Calculated Protein needs :60 grams (.8 grams /kg)   Nutrition Focused Physical Findings:   Unable to assess via video    Meds  and Biochemical Data   Labs  @BRIEFLAB(GLU,K,PHOS,MG,CHOL,HDL,LDL,TRIG,ALBUMIN,PREALBUMIN,AMMONIA,HGBA1C,CALCIUM)  Lab Results   Component Value Date    WBC 4.93 11/24/2020    HGB 11.4 (L) 11/24/2020    HCT 36.8 (L) 11/24/2020    MCV 92 11/24/2020     11/24/2020     Lab Results   Component Value Date    FERRITIN 5 (L) 11/24/2020     Lab Results   Component Value Date     11/24/2020    K 3.7 11/24/2020     11/24/2020    CO2 25 11/24/2020     (H) 11/24/2020    BUN 16 11/24/2020    CREATININE 0.7 11/24/2020    CALCIUM 8.6 (L) 11/24/2020    CALCIUM 8.6 (L) 11/24/2020    PROT 8.0 11/24/2020    ALBUMIN 3.5 11/24/2020    BILITOT 0.3 11/24/2020    ALKPHOS 135 11/24/2020    AST 16 11/24/2020    ALT 10 11/24/2020     Lab Results   Component Value Date    TSH 1.487 05/10/2019     Lab Results   Component Value Date    CRP 12.9 (H) 11/24/2020     Phosphorus   Date Value Ref Range Status   11/24/2020 3.2 " 2.7 - 4.5 mg/dL Final     No results found for: PREALBUMIN  Cholesterol   Date Value Ref Range Status   05/10/2019 125 120 - 199 mg/dL Final     Comment:     The National Cholesterol Education Program (NCEP) has set the  following guidelines (reference ranges) for Cholesterol:  Optimal.....................<200 mg/dL  Borderline High.............200-239 mg/dL  High........................> or = 240 mg/dL     09/06/2018 115 (L) 120 - 199 mg/dL Final     Comment:     The National Cholesterol Education Program (NCEP) has set the  following guidelines (reference ranges) for Cholesterol:  Optimal.....................<200 mg/dL  Borderline High.............200-239 mg/dL  High........................> or = 240 mg/dL       Iron   Date Value Ref Range Status   11/24/2020 29 (L) 30 - 160 ug/dL Final   04/10/2019 43 30 - 160 ug/dL Final     Folate   Date Value Ref Range Status   11/24/2020 11.5 4.0 - 24.0 ng/mL Final   02/21/2018 9.5 4.0 - 24.0 ng/mL Final     No components found for: BOEIZCEN06  No components found for: BTRKHUVE22  No components found for: VITAMIND2  No components found for: VITAMIND    Lab Results   Component Value Date    FERRITIN 5 (L) 11/24/2020     Lab Results   Component Value Date    CRP 12.9 (H) 11/24/2020     Lab Results   Component Value Date    SEDRATE 42 (H) 11/24/2020     Assessment of Lab Values: Decreased iron and H &H  , increased BG ; decreased calcium   Medication:  Current Outpatient Medications   Medication Sig    albuterol (VENTOLIN HFA) 90 mcg/actuation inhaler Inhale 2 puffs into the lungs every 4 (four) hours as needed for Wheezing. Rescue    ALPRAZolam (XANAX) 0.5 MG tablet Take 1 tablet by mouth 1 hour  before the EVLT procedure.  You may take a second tablet right before the procedure; please check with our nurse.    carboxymethylcellulose sodium (REFRESH TEARS) 0.5 % Drop Apply 1-2 drops to every as needed    conjugated estrogens (PREMARIN) vaginal cream Place 1 g vaginally  twice a week. Place pea sized amount to vagina twice per day for two weeks then twice per week therafter    ergocalciferol (ERGOCALCIFEROL) 50,000 unit Cap Take 1 capsule (50,000 Units total) by mouth every 7 days.    meloxicam (MOBIC) 7.5 MG tablet Take 1 tablet (7.5 mg total) by mouth once daily.    multivitamin capsule Take 1 capsule by mouth once daily.     mycophenolate mofetil (CELLCEPT) 200 mg/mL SusR Take 3.75 mLs (750 mg total) by mouth 2 (two) times daily.    NIFEdipine (ADALAT CC) 90 MG TbSR TAKE 1 TABLET(90 MG) BY MOUTH EVERY DAY    omeprazole (PRILOSEC) 40 MG capsule TAKE 1 CAPSULE(40 MG) BY MOUTH TWICE DAILY BEFORE MEALS    pravastatin (PRAVACHOL) 20 MG tablet TAKE 1 TABLET BY MOUTH EVERY EVENING    pregabalin (LYRICA) 300 MG Cap Take 1 capsule (300 mg total) by mouth once daily.    PREVIDENT 5000 SENSITIVE 1.1-5 % Pste BRUSH FOR 2 MINUTES TWICE PER DAY    tadalafil (ADCIRCA) 20 mg Tab Take 2 tablets (40 mg total) by mouth once daily.    tiZANidine (ZANAFLEX) 4 MG tablet Take 1 tablet (4 mg total) by mouth nightly as needed.    traZODone (DESYREL) 50 MG tablet Take 1 tablet (50 mg total) by mouth every evening.     Current Facility-Administered Medications   Medication    lidocaine-EPINEPHrine 1%-1:100,000 30 mL, lidocaine HCL 10 mg/ml (1%) 20 mL, sodium bicarbonate 10 mL in sodium chloride 0.9% 500 mL solution     Nutritionally significant meds: Cellcept ( increases lipids ,anorexia, abd pain,  Pravachol ( decreases lipids )  Vitamin/Supplements/Herbs: refer to above   Potential Food drug Interaction:   Allergies:  Review of patient's allergies indicates:   Allergen Reactions    Sulfa (sulfonamide antibiotics)      Other reaction(s): Rash      Dietary Data  Current Diet: Difficulty chewing foods due to tooth loss and compromised dentition - missing lower teeth due to delay due to COVID ; foods particularly difficult to chew and swallow : bread, rice, meat ; foods best tolerated - Milk  using 2% milk and almond milk ,ice cream,  Pudding , jello , grits, eggs;   Diet Recall: Breakfast : grits and egg, toast, [place] : lunch [place]: tuna salad ; supper : Turkey chopped, stuffing , sweet potatoes, cake or pudding   Fluid Intake /quantity: Milk , water,   Fruit: increased volume banana   Vegetables: cooked well   Meal preparation/shopping: self   Dining out: none   Foods poorly tolerated : breads, meats, crackers, cookies, rice ,nuts, vegetables unless overcooked to make easier to swallow   Milk tolerance : no problem noted - uses 2%  Or almond   Nuts/seeds: avoids due to difficulty chewing   Fat tolerance : notes it helps with swallowing but has rebound reflux and digestive effects ( increased amount-looser stools)  Sugars and desserts increased preference recently   Supplements/ MVI: above  Review of patient's allergies indicates:   Allergen Reactions    Sulfa (sulfonamide antibiotics)      Other reaction(s): Rash      Nutrition Diagnosis: Altered GI Function related to mouth dryness, reflux ( especially nocturnal) , dysphagia with low moisture foods and meats partly due to absence of teeth as evidenced by increased use of cooked starches ,desserts and fewer meats and vegetables .    Goals/Recommendation: Increase moisture of bolus( crock pot, sauces, ff gravies) ; mechanically modify meats,vegetables ; limit bread and crackers unless moistened ; avoidance of foods which cause reflux ; limit meal size to reduce reflux ; address sleep /snoring which may be contributing to reflux ; address dental issues when safe and appropriate.  Education Provided by email Reflux, moistening food bolus ( crock pot, sauces, ff gravy , jelly) ; mechanically modifying foods in absence of dentition ; meal replacements   Patient and/or family comprehend instructions: yes   Outcome: patient indicated understanding through clarification questions   Monitoring: weight, evidence of reflux/aspiration, BG, reflux symptoms ,  coughing, choking   Follow-up: 1 month per scheduling /audio appt requested   Counseling Time: 60 minutes

## 2020-12-03 ENCOUNTER — OFFICE VISIT (OUTPATIENT)
Dept: WOUND CARE | Facility: CLINIC | Age: 70
End: 2020-12-03
Payer: MEDICARE

## 2020-12-03 VITALS
BODY MASS INDEX: 26.75 KG/M2 | WEIGHT: 166.44 LBS | TEMPERATURE: 97 F | HEIGHT: 66 IN | DIASTOLIC BLOOD PRESSURE: 52 MMHG | HEART RATE: 89 BPM | SYSTOLIC BLOOD PRESSURE: 107 MMHG

## 2020-12-03 DIAGNOSIS — L97.529 SKIN ULCERS OF BOTH FEET: Primary | ICD-10-CM

## 2020-12-03 DIAGNOSIS — I87.2 VENOUS INSUFFICIENCY OF BOTH LOWER EXTREMITIES: Primary | ICD-10-CM

## 2020-12-03 DIAGNOSIS — L97.519 SKIN ULCERS OF BOTH FEET: Primary | ICD-10-CM

## 2020-12-03 PROCEDURE — 99999 PR PBB SHADOW E&M-EST. PATIENT-LVL V: CPT | Mod: PBBFAC,,, | Performed by: NURSE PRACTITIONER

## 2020-12-03 PROCEDURE — 99215 OFFICE O/P EST HI 40 MIN: CPT | Mod: PBBFAC | Performed by: NURSE PRACTITIONER

## 2020-12-03 PROCEDURE — 99999 PR PBB SHADOW E&M-EST. PATIENT-LVL V: ICD-10-PCS | Mod: PBBFAC,,, | Performed by: NURSE PRACTITIONER

## 2020-12-03 PROCEDURE — 99213 OFFICE O/P EST LOW 20 MIN: CPT | Mod: S$PBB,,, | Performed by: NURSE PRACTITIONER

## 2020-12-03 PROCEDURE — 99213 PR OFFICE/OUTPT VISIT, EST, LEVL III, 20-29 MIN: ICD-10-PCS | Mod: S$PBB,,, | Performed by: NURSE PRACTITIONER

## 2020-12-03 NOTE — PROGRESS NOTES
Subjective:       Patient ID: Yamel Shah is a 70 y.o. female.    Chief Complaint: Wound Check    Wound Check         69 y.o. female presents for evaluation and treatment of ulcers to right foot that have been present on and off since 2015.  She is known to this clinic, last seen 4/2018 for ulcers to her feet. She has a history of Raynaud's disease and scleroderma with progressive contraction and stiffness to her hands, R>L.  Wound care was OTC antibiotic ointment to wounds every other day and covering with dry dressing.  States wounds have improved with the ointment.  States she cannot tolerate medihoney or Santyl and that they  made the wounds worse and debridement of wounds make it worse also and she does not tolerated compression. She also states she cannot tolerated the hydrofera blue ready.  She had been applying just dry dressing with improvement to the wounds. Her wound healing is complicated by scleroderma and venous insufficiency.  She has venous US 6/2019 and had f/u with vascular 8/26/2020 with bilateral lower extremity reflux on 9/4/20, results reviewed.       Past Medical History:   Diagnosis Date    Abnormal Pap smear     Acid reflux     Allergy     Arthritis     GERD (gastroesophageal reflux disease)     History of migraine headaches     Hypertension     Idiopathic neuropathy 7/20/2012    ILD (interstitial lung disease) 11/6/2013    Iron deficiency anemia 3/18/2014    MRSA carrier     Osteopenia     Pneumonia     Pulmonary fibrosis     Pulmonary hypertension     Raynaud's disease     Scleroderma, diffuse     Sjogren's syndrome     Vitamin D deficiency 11/14/2013     Past Surgical History:   Procedure Laterality Date    24 HOUR IMPEDANCE PH MONITORING OF ESOPHAGUS IN PATIENT NOT TAKING ACID REDUCING MEDICATIONS N/A 3/4/2020    Procedure: IMPEDANCE PH STUDY, ESOPHAGEAL, 24 HOUR, IN PATIENT NOT TAKING ACID REDUCING MEDICATION;  Surgeon: Annamaria Mendoza MD;  Location: [facility]  ENDO (4TH FLR);  Service: Endoscopy;  Laterality: N/A;  OFF PPI/H2 Blocker   Motility Studies   Hold Narcotics x 1 days   Hold TCA x 1 days  2/26 - LVM attempting to confirm appt  2/27 - Confirmed appt    BREAST BIOPSY      Left, benign    CERVICAL CONIZATION   W/ LASER  1970    COLONOSCOPY      COLONOSCOPY N/A 3/29/2019    Procedure: COLONOSCOPY;  Surgeon: Annamaria Mendoza MD;  Location: [facility] (2ND FLR);  Service: Endoscopy;  Laterality: N/A;    DILATION AND CURETTAGE OF UTERUS      ESOPHAGEAL MANOMETRY WITH MEASUREMENT OF IMPEDANCE N/A 3/4/2020    Procedure: MANOMETRY, ESOPHAGUS, WITH IMPEDANCE MEASUREMENT;  Surgeon: Annamaria Mendoza MD;  Location: [facility] (4TH FLR);  Service: Endoscopy;  Laterality: N/A;  OFF PPI/H2 Blocker   Motility Studies   Hold Narcotics x 1 days   Hold TCA x 1 days    ESOPHAGOGASTRODUODENOSCOPY      ESOPHAGOGASTRODUODENOSCOPY N/A 3/29/2019    Procedure: EGD (ESOPHAGOGASTRODUODENOSCOPY);  Surgeon: Annamaria Mendoza MD;  Location: [facility] (2ND FLR);  Service: Endoscopy;  Laterality: N/A;  pulmonary htn    HYSTERECTOMY  1990    FRANDY (AUB, Fibroids), ovaries remain          Review of Systems   Constitutional: Negative for activity change, chills, diaphoresis, fatigue and fever.   Respiratory: Negative for apnea, cough, chest tightness and shortness of breath.    Cardiovascular: Negative for chest pain, palpitations and leg swelling.   Musculoskeletal: Negative for gait problem and joint swelling.   Skin: Positive for wound. Negative for pallor and rash.   Neurological: Negative for syncope, weakness and numbness.   Psychiatric/Behavioral: Negative for agitation. The patient is not nervous/anxious.    All other systems reviewed and are negative.            Objective:      Physical Exam  Vitals signs reviewed.   Constitutional:       General: She is not in acute distress.     Appearance: She is well-developed.   HENT:      Head: Normocephalic and atraumatic.   Eyes:      Pupils:  Pupils are equal, round, and reactive to light.   Neck:      Musculoskeletal: Normal range of motion.   Cardiovascular:      Rate and Rhythm: Normal rate.   Pulmonary:      Effort: Pulmonary effort is normal. No respiratory distress.   Musculoskeletal:         General: Deformity (hands bilaterally) present.   Skin:     General: Skin is warm and dry.      Capillary Refill: Capillary refill takes less than 2 seconds.   Neurological:      Mental Status: She is alert and oriented to person, place, and time.   Psychiatric:         Judgment: Judgment normal.         Assessment:       1. Skin ulcers of right feet           Wound 08/17/20 0916 Ulceration Right lateral Leg #1 (Active)   08/17/20 0916    Pre-existing: Yes   Primary Wound Type: Ulceration   Side: Right   Orientation: lateral   Location: Leg   Wound Number (optional): #1   Ankle-Brachial Index:    Pulses:    Removal Indication and Assessment:    Wound Outcome:    (Retired) Wound Type:    (Retired) Wound Length (cm):    (Retired) Wound Width (cm):    (Retired) Depth (cm):    Wound Description (Comments):    Removal Indications:    Wound Image   12/03/20 1115   Dressing Appearance Intact;Dry 12/03/20 1115   Drainage Amount Small 12/03/20 1115   Drainage Characteristics/Odor Clear 12/03/20 1115   Appearance Red;Slough;Granulating 12/03/20 1115   Tissue loss description Partial thickness 12/03/20 1115   Red (%), Wound Tissue Color 50 % 12/03/20 1115   Yellow (%), Wound Tissue Color 50 % 12/03/20 1115   Periwound Area Intact 12/03/20 1115   Wound Edges Undefined 12/03/20 1115   Wound Length (cm) 1 cm 12/03/20 1115   Wound Width (cm) 1.1 cm 12/03/20 1115   Wound Depth (cm) 0.2 cm 12/03/20 1115   Wound Volume (cm^3) 0.22 cm^3 12/03/20 1115   Wound Surface Area (cm^2) 1.1 cm^2 12/03/20 1115   Care Cleansed with:;Wound cleanser 12/03/20 1115   Dressing Applied;Gauze 12/03/20 1115            Wound 08/17/20 0919 Ulceration Right dorsal Foot #2 (Active)   08/17/20 0919     Pre-existing: Yes   Primary Wound Type: Ulceration   Side: Right   Orientation: dorsal   Location: Foot   Wound Number (optional): #2   Ankle-Brachial Index:    Pulses:    Removal Indication and Assessment:    Wound Outcome:    (Retired) Wound Type:    (Retired) Wound Length (cm):    (Retired) Wound Width (cm):    (Retired) Depth (cm):    Wound Description (Comments):    Removal Indications:    Wound Image   12/03/20 1115   Dressing Appearance Dry;Intact 12/03/20 1115   Drainage Amount Small 12/03/20 1115   Drainage Characteristics/Odor Clear 12/03/20 1115   Appearance Slough 12/03/20 1115   Tissue loss description Partial thickness 12/03/20 1115   Yellow (%), Wound Tissue Color 100 % 12/03/20 1115   Periwound Area Dry;Intact 12/03/20 1115   Wound Edges Undefined 12/03/20 1115   Wound Length (cm) 1 cm 12/03/20 1115   Wound Width (cm) 1 cm 12/03/20 1115   Wound Depth (cm) 0.2 cm 12/03/20 1115   Wound Volume (cm^3) 0.2 cm^3 12/03/20 1115   Wound Surface Area (cm^2) 1 cm^2 12/03/20 1115   Care Cleansed with:;Wound cleanser 12/03/20 1115   Dressing Applied;Gauze 12/03/20 1115           Plan:       Per patient request, apply dry gauze to wounds daily  Advised the importance of wound dressings for optimal wound healing  Do not get wound dressings wet, if wet remove soiled dressings and apply new dressings  Observe for signs and symptoms of infection and report symptoms to clinic  F/U with vascular as scheduled  RTC as scheduled

## 2020-12-04 ENCOUNTER — PROCEDURE VISIT (OUTPATIENT)
Dept: VASCULAR SURGERY | Facility: CLINIC | Age: 70
End: 2020-12-04
Payer: MEDICARE

## 2020-12-04 VITALS
HEART RATE: 70 BPM | DIASTOLIC BLOOD PRESSURE: 68 MMHG | BODY MASS INDEX: 26.75 KG/M2 | HEIGHT: 66 IN | SYSTOLIC BLOOD PRESSURE: 160 MMHG | TEMPERATURE: 98 F | WEIGHT: 166.44 LBS

## 2020-12-04 DIAGNOSIS — I87.2 VENOUS INSUFFICIENCY OF BOTH LOWER EXTREMITIES: ICD-10-CM

## 2020-12-04 DIAGNOSIS — I87.2 VENOUS INSUFFICIENCY (CHRONIC) (PERIPHERAL): Primary | ICD-10-CM

## 2020-12-04 PROCEDURE — 36478 PR ENDOVENOUS LASER, 1ST VEIN: ICD-10-PCS | Mod: S$PBB,RT,, | Performed by: SURGERY

## 2020-12-04 PROCEDURE — 36478 ENDOVENOUS LASER 1ST VEIN: CPT | Mod: S$PBB,RT,, | Performed by: SURGERY

## 2020-12-04 PROCEDURE — 36478 ENDOVENOUS LASER 1ST VEIN: CPT | Mod: PBBFAC,RT | Performed by: SURGERY

## 2020-12-04 RX ORDER — MELOXICAM 7.5 MG/1
7.5 TABLET ORAL DAILY
Qty: 7 TABLET | Refills: 0 | Status: SHIPPED | OUTPATIENT
Start: 2020-12-04 | End: 2020-12-15

## 2020-12-04 NOTE — PROCEDURES
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  Ultrasound-guidance was used throughout the procedure with a portable duplex ultrasound machine.  The right LSV was cannulated using a micro-puncture system.  An 0.018 wire J-tip followed by a 4-Fr Angiodynamics sheath was placed 4 cm from the sapheno-popliteal function.  The laser fiber was passed through the sheath and the sheath was then pin-pulled back leaving the laser tip 4 cm from the junction.  Using tumescent anesthesia, the entire sheathed portion of the LSV was anesthesized keeping the vein at least one cm from the skin; Klein pump was used.  Position of the tip of the laser was then reconfirmed to be 4 cm from the saphenopopliteal junction.  The 1470 nm laser was then activated and the entire length of the vein was treated at ~ 35 J/cm.    The fiber and sheath were then removed intact.  Duplex confirmed occlusion of the LSV with continued patency of the popliteal vein.  The incisions were closed with Steri-strips.   Sterile compression dressings and a compression stocking were applied and the patient was discharged to home in a satisfactory condition.    Cannulation site: Distal posterior calf    Sheath length: 20 cm    Mora of power: 7 W    Laser time: 100 sec    Joules: 240 J         Timmy Claudio MD DFSVS FACS  Vascular & Endovascular Surgery

## 2020-12-07 ENCOUNTER — CLINICAL SUPPORT (OUTPATIENT)
Dept: REHABILITATION | Facility: OTHER | Age: 70
End: 2020-12-07
Attending: NURSE PRACTITIONER
Payer: MEDICARE

## 2020-12-07 DIAGNOSIS — R29.3 POSTURE ABNORMALITY: ICD-10-CM

## 2020-12-07 DIAGNOSIS — R29.898 WEAKNESS OF BOTH UPPER EXTREMITIES: ICD-10-CM

## 2020-12-07 DIAGNOSIS — M54.2 PAINFUL CERVICAL RANGE OF MOTION: ICD-10-CM

## 2020-12-07 PROCEDURE — 97110 THERAPEUTIC EXERCISES: CPT | Mod: PN,CQ

## 2020-12-07 NOTE — PROGRESS NOTES
"    Physical Therapy Daily Treatment Note     Name: Yamel Shah  Clinic Number: [id number]    Therapy Diagnosis:   Encounter Diagnoses   Name Primary?    Painful cervical range of motion     Weakness of both upper extremities     Posture abnormality      Physician: Viktoriya Camara NP    Visit Date: 12/7/2020    Physician Orders: PT Eval and Treat      Medical Diagnosis from Referral: M47.812 (ICD-10-CM) - Cervical spondylosis     Evaluation Date: 10/26/2020  Authorization Period Expiration: 10/02/2021  Plan of Care Expiration: 12/21/20 (8 weeks)  Reassessment Due: 11/27/20  Visit # / Visits authorized: 8/ 20     Time In: 1348  Time Out: 1424  Total Billable Time: 30 minutes    Precautions: Hx of MRSA; Recent Endovenous laser procedure; Scleroderma with pulmonary involvement; Venous and arterial insufficiencies; chronic pain; Hx of fecal incontinence; Hx of contractures; (See medical problem list). Deformed hands/fingers.         Subjective     Pt states having a LE procedure performed Friday.  Pt mentioned her physician wants her to limit lower body activity until Wednesday 12/9/2020.  Pt reports w/ an ulcer on her L elbow that she is currently taking antibiotics for.  Pt also mentioned burning her L wrist cooking last Wednesday.  Pt asked if she can use hot packs for the soreness in her neck    She was compliant with home exercise program.  Response to previous treatment: no adverse effects  Functional change: no change     Prior Level of Function: Unlimited   Current Level of Function: unable to sit to sewe or use computer for a long time; unable to turn or bend head fwd head comfortably  DME owned/used: NA     Pain: Took pain medication today  Current 4/10, worst 8/10, best 3/10   Location: right neck  Description: "Pulling/tearing muscle, some sharp"  Aggravating Factors: Prolonged sitting, neck rotation B; bending head down   Easing Factors: pain medication and heating pad  Disturbed Sleep: No   " "  Pattern of pain questions:  1.  Where is your pain the worst? R side neck  2.  Is your pain constant or intermittent? Constant  3.  Does bending forward make your typical pain worse? Yes  4.  Since the start of your neck pain, has there been a change in your bowel or bladder? No  5.  What can't you do now that you use to be able to do? Turn head to either sides, bending neck fwd, sitting for a long time at computer        Pts goals: "Feel better; alleviate pain".       Objective     Yamel received therapeutic exercises to develop/improve posture, lumbar/cervical ROM, strength and muscular endurance for  30 minutes including the following exercises:    Scap retractions 30x5"    Supine chin tucks 30x  Supine cervical rotations 2 x 10 B     Recumbent bike for 5 minutes L 2 to increase blood flow   UT stretch 3x30" B   Supine cervical retractions 2 x 15 x 3"  ( unable to perform seated)Open book (upper trunk/neck rotation SL) 20x ea  Seated no monies/slouch corrections gtb 3 x 10 3" modified with loops for wrists  Rows otb 20x  modified with loops for wrists  Shld ext  otb 20x -modified with loops for wrists    Yamel received the following manual therapy techniques: Manual traction and Soft tissue Mobilization were applied to the: 00 for 00 minutes, including:  NP    Yamel participated in neuromuscular re-education activities to improve: Posture for 00 minutes. The following activities were included:  NP    Yamel received hot pack for 00 minutes to NP.    Yamel received cold pack for 00 minutes to NP.      Home Exercises Provided and Patient Education Provided     Education provided:   - Pt edu on proper exercise technique.  Pt advised to use hot packs with caution making sure not to burn her skin and not to apply anywhere with any loss of sensation.        Written Home Exercises Provided: Patient instructed to cont prior HEP.  Exercises were reviewed and Yamel was able to demonstrate them " prior to the end of the session.  Yamel demonstrated good  understanding of the education provided.     See EMR under Patient Instructions for exercises provided 11/6/2020.    Assessment   Therex limited 2* LE procedure and L elbow ulcer.  Pt jelly tx well w/ no c/o pn.  Pt cont to lack cervical ROM.        Yamel is progressing well towards her goals.   Pt prognosis is Good.       Pt will continue to benefit from skilled outpatient physical therapy to address the deficits listed in the problem list box on initial evaluation, provide pt/family education and to maximize pt's level of independence in the home and community environment.     Pt's spiritual, cultural and educational needs considered and pt agreeable to plan of care and goals.     Anticipated Barriers for therapy: Multiple co-morbidities    Short term goals: 4 weeks    1.  Pt will demonstrate  >50%cervical AROM  W/o increasing ERP by more than 1-2 points  2. Pt will initiate home exercise program to improve strength, flexibility, endurance, and neck mobility  to return pt to PLOF   3. Pt will report improved ability to lift and carry, and sustain good posture while performing ADL's and work w/o  Increasing neck pain     Long term goals: 8 weeks  1. Pt will demonstrate >70% of  cervical AROM without increasing  ERP by more than 1 point,  which results in WFL ROM of neck for ease with ADLs and driving, and working/reading  2. Pt will demonstrate reduced pain and improved functional outcomes as reported on the FOTO by reaching a limitation score of  20% or less in order to demonstrate subjective improvement in pt's condition.    3. Pt will demonstrate independence with reducing or controlling symptoms with ther ex, movement, or position independently, able to reduce pain at worst at B shoulders 3-4 points overall on pain scale using strategies taught in therapy  4. Pt will demonstrate independence with the HEP at discharge.  5. Pt will demonstrate B UE  MMT strength at 4+/5       Plan   Cont to progress towards goals set by PT.  Work to increase cervical ROM and posture next visit.          Jerel Orona, PTA

## 2020-12-08 ENCOUNTER — OFFICE VISIT (OUTPATIENT)
Dept: GASTROENTEROLOGY | Facility: CLINIC | Age: 70
End: 2020-12-08
Payer: MEDICARE

## 2020-12-08 ENCOUNTER — LAB VISIT (OUTPATIENT)
Dept: LAB | Facility: HOSPITAL | Age: 70
End: 2020-12-08
Attending: INTERNAL MEDICINE
Payer: MEDICARE

## 2020-12-08 VITALS
SYSTOLIC BLOOD PRESSURE: 127 MMHG | HEART RATE: 78 BPM | HEIGHT: 66 IN | BODY MASS INDEX: 26.72 KG/M2 | DIASTOLIC BLOOD PRESSURE: 60 MMHG | WEIGHT: 166.25 LBS

## 2020-12-08 DIAGNOSIS — R63.5 WEIGHT GAIN: ICD-10-CM

## 2020-12-08 DIAGNOSIS — D50.9 IRON DEFICIENCY ANEMIA, UNSPECIFIED IRON DEFICIENCY ANEMIA TYPE: Primary | ICD-10-CM

## 2020-12-08 DIAGNOSIS — R13.10 DYSPHAGIA, UNSPECIFIED TYPE: ICD-10-CM

## 2020-12-08 DIAGNOSIS — R14.0 BLOATING: ICD-10-CM

## 2020-12-08 DIAGNOSIS — K21.9 GASTROESOPHAGEAL REFLUX DISEASE, UNSPECIFIED WHETHER ESOPHAGITIS PRESENT: ICD-10-CM

## 2020-12-08 DIAGNOSIS — D50.9 IRON DEFICIENCY ANEMIA, UNSPECIFIED IRON DEFICIENCY ANEMIA TYPE: ICD-10-CM

## 2020-12-08 DIAGNOSIS — R10.9 ABDOMINAL PAIN, UNSPECIFIED ABDOMINAL LOCATION: ICD-10-CM

## 2020-12-08 DIAGNOSIS — R68.81 EARLY SATIETY: ICD-10-CM

## 2020-12-08 LAB
IGA SERPL-MCNC: 406 MG/DL (ref 40–350)
TSH SERPL DL<=0.005 MIU/L-ACNC: 1.37 UIU/ML (ref 0.4–4)

## 2020-12-08 PROCEDURE — 36415 COLL VENOUS BLD VENIPUNCTURE: CPT

## 2020-12-08 PROCEDURE — 82784 ASSAY IGA/IGD/IGG/IGM EACH: CPT

## 2020-12-08 PROCEDURE — 99215 PR OFFICE/OUTPT VISIT, EST, LEVL V, 40-54 MIN: ICD-10-PCS | Mod: S$PBB,,, | Performed by: INTERNAL MEDICINE

## 2020-12-08 PROCEDURE — 99999 PR PBB SHADOW E&M-EST. PATIENT-LVL IV: ICD-10-PCS | Mod: PBBFAC,,, | Performed by: INTERNAL MEDICINE

## 2020-12-08 PROCEDURE — 99999 PR PBB SHADOW E&M-EST. PATIENT-LVL IV: CPT | Mod: PBBFAC,,, | Performed by: INTERNAL MEDICINE

## 2020-12-08 PROCEDURE — 99214 OFFICE O/P EST MOD 30 MIN: CPT | Mod: PBBFAC | Performed by: INTERNAL MEDICINE

## 2020-12-08 PROCEDURE — 99215 OFFICE O/P EST HI 40 MIN: CPT | Mod: S$PBB,,, | Performed by: INTERNAL MEDICINE

## 2020-12-08 PROCEDURE — 83516 IMMUNOASSAY NONANTIBODY: CPT

## 2020-12-08 PROCEDURE — 84443 ASSAY THYROID STIM HORMONE: CPT

## 2020-12-08 RX ORDER — DEXLANSOPRAZOLE 30 MG/1
30 CAPSULE, DELAYED RELEASE ORAL EVERY 12 HOURS
Qty: 90 CAPSULE | Refills: 3 | Status: SHIPPED | OUTPATIENT
Start: 2020-12-08 | End: 2021-02-08

## 2020-12-08 NOTE — PROGRESS NOTES
[facility] Established Patient Visit    Reason for Visit:  The primary encounter diagnosis was Iron deficiency anemia, unspecified iron deficiency anemia type. Diagnoses of Weight gain, Dysphagia, unspecified type, Gastroesophageal reflux disease, unspecified whether esophagitis present, Early satiety, Bloating, and Abdominal pain, unspecified abdominal location were also pertinent to this visit.    PCP: Aly Rand    HPI:This is a 70 y.o. Female with a PMH of  scleroderma, ILD, PHTN here today for the following GI symptoms:    GERD.   Pyrosis. Every night at 3am, ok during the day   Regurgitation. Daily. More at night  Coughing at nigh    MEDs:  omeprazole 40 mg BID, helps somewhat, not as much at night   Caffeine is a trigger.      Dysphagia.  Difficult w solids  Doing ok w plants    Dry mouth. Not using biotin mouth wash anymore    Gastroparesis.   Denies nausea, vomiting   Early satiety daily   Seen by dietitian re gp diet     Abd pain.   Located in upper abd/epigastric and lower mid  Better w BMs  Associated w distention     Bloating  And gas.  With distention. Worse with meals. Worse as the day progresses.   Still consumes lactose bc elimination    and artificial sugars.    Normal Bowel movements.   Har[place] type 4-5  BM daily    Fecal incontinece. Resolved    Anemia. ROXANNE. No overt bleeding.   Not taking iron     Denies abdominal pain, diarrhea, constipation, anxiety/deperssion, insomnia, melena, BRBPR.    Weight gain. 155 to 166.      Total visit time was 40 minutes, more than 50% of which was spent in face-to-face counseling with patient regarding symptoms, diagnostic results, prognosis, risks and benefits of treatment options, instructions for management, stress reduction, coping strategies and coordination of care.        Previous Studies:  Gastric emptying study 05/04/2020:  Delayed gastric emptying.  For hour % retention of 25%.  PH impedance 03/06/2020:  Significant acid reflux of  PI.  Total % pH less than 4 was 41.  [place] 133.  Manometry 03/04/2020:  Low LES pressure with complete relaxation.  Absent contractility.  Incomplete bolus clearance.  Hiatal hernia.  Abnormal multiple rapid swallow test.  Unable to perform apple and nancy cracker swallows.  Evidence of residual liquid after 150 cc bolus  MBSS 10/31/19:Flash penetration, no aspiration.  Moderate vallecular pooling, mild Piriforms sinus pooling. The results of this MBSS indicate that patient demonstrates moderate swallowing dysfunction c/b intermittent silent penetration of thin liquids as well as moderate pharyngeal residue across consistencies. No aspiration observed. Given patient's esophageal symptoms and her history of scleroderma, prognosis for improvement of swallowing with therapy is good/ fair.   -Diet: recommend thin liquids and regular as tolerated.   -Therapeutic technique: recommend small bites/sips, alternate solid with liquid wash and multiple swallows per bolus.  -Dysphagia therapy, for 4-6 sessions over the course of 4-6 weeks, in order to increase tongue base retraction, increase pharyngeal contraction and increase swallow safety by implementing therapeutic maneuvers.   -Repeat MBSS in 6-8 weeks for reeval after completion of dysphagia therapy.  -Contact clinician or referring provider for repeat MBSS if swallowing status changes or worsens.  -Recommend follow-up with GI.     3/29/19 EGD:dilation in the entire esophagus. irreg zline (-). 3 cm HH. Benign appearing esophageal stenosis. Nl stomach. Few gastric polyps (hp). Nl duodenum (-).   3/29/19 colon:GPTTI. Five 2-5 mm TC polyps (TAs). Rpt in 3 yrs  1/26/2017 EGD Lammi: NL esophagus(-).  irrg z line (-). Nl stomach. Duodenal congestion (mild chr inflamm). Esophageal polyps (hp). Rpt 6 months to check for complete removal.   2014 EGD Dr. Gusman-GEJ polyps. bx-chronic inflammation;hyperplastic change. Repeat in 6 months for surveillance.   2014 colonoscopy   Naseem - 3 mm descending colon polyp. Medium internal hemorrhoids. path- fecal matter, no human tissue present. Repeat in 5 years.  2011 EGD Ch- HH. White nummular esophageal lesions. irregular z line. Esophageal nodule. Path- mild chronic gastritis. No IM. Esophageal candida.  2011 colonoscopy Ch- sigmoid tics. Internal hemorrhoids. 4 mm ascending colon polyp. Path-hyperplastic polyp. repeat in 5 years.   2009 EGD Ch- Schatzki ring, esophogeal nodule, HH, irregular z line. Path-chronic gastritis  2007 colonoscopy Girgrah- WNL  2007 EGD Girgrah- irregular z line. Path-chronic inflammation. No IM.      ROS:  Constitutional: No fevers, no chills, No unintentional weight loss, + improving fatigue,   ENT: No allergies  CV: No chest pain, no palpitations, +  perif. edema, no sob on exertion  Pulm: No cough, no shortness of breath, + wheezes, no sputum  Ophtho: No vision changes  GI: see HPI; also no nausea, no vomiting, no change in appetite  Derm: No rash  Heme: No lymphadenopathy, No bruising  MSK: + arthritis, no muscle pain, no muscle weakness  : No dysuria, No hematuria  Endo: No hot or cold intolerance  Neuro: No syncope, No seizure,     PMHX:  has a past medical history of Abnormal Pap smear, Acid reflux, Allergy, Arthritis, GERD (gastroesophageal reflux disease), History of migraine headaches, Hypertension, Idiopathic neuropathy (7/20/2012), ILD (interstitial lung disease) (11/6/2013), Iron deficiency anemia (3/18/2014), MRSA carrier, Osteopenia, Pneumonia, Pulmonary fibrosis, Pulmonary hypertension, Raynaud's disease, Scleroderma, diffuse, Sjogren's syndrome, and Vitamin D deficiency (11/14/2013).    PSHX:  has a past surgical history that includes Dilation and curettage of uterus; Breast biopsy; Cervical conization w/ laser (1970); Hysterectomy (1990); Esophagogastroduodenoscopy; Colonoscopy; Esophagogastroduodenoscopy (N/A, 3/29/2019); Colonoscopy (N/A, 3/29/2019); Esophageal manometry with  measurement of impedance (N/A, 3/4/2020); and 24 hour impedance pH monitoring of esophagus in patient not taking acid reducing medications (N/A, 3/4/2020).    The patient's social and family histories were reviewed by me and updated in the appropriate section of the electronic medical record.    Review of patient's allergies indicates:   Allergen Reactions    Sulfa (sulfonamide antibiotics)      Other reaction(s): Rash       Current Outpatient Medications   Medication Sig    albuterol (VENTOLIN HFA) 90 mcg/actuation inhaler Inhale 2 puffs into the lungs every 4 (four) hours as needed for Wheezing. Rescue    carboxymethylcellulose sodium (REFRESH TEARS) 0.5 % Drop Apply 1-2 drops to every as needed    ergocalciferol (ERGOCALCIFEROL) 50,000 unit Cap Take 1 capsule (50,000 Units total) by mouth every 7 days.    meloxicam (MOBIC) 7.5 MG tablet Take 1 tablet (7.5 mg total) by mouth once daily.    multivitamin capsule Take 1 capsule by mouth once daily.     mycophenolate mofetil (CELLCEPT) 200 mg/mL SusR Take 3.75 mLs (750 mg total) by mouth 2 (two) times daily.    NIFEdipine (ADALAT CC) 90 MG TbSR TAKE 1 TABLET(90 MG) BY MOUTH EVERY DAY    omeprazole (PRILOSEC) 40 MG capsule TAKE 1 CAPSULE(40 MG) BY MOUTH TWICE DAILY BEFORE MEALS    pravastatin (PRAVACHOL) 20 MG tablet TAKE 1 TABLET BY MOUTH EVERY EVENING    pregabalin (LYRICA) 300 MG Cap Take 1 capsule (300 mg total) by mouth once daily.    PREVIDENT 5000 SENSITIVE 1.1-5 % Pste BRUSH FOR 2 MINUTES TWICE PER DAY    tadalafil (ADCIRCA) 20 mg Tab Take 2 tablets (40 mg total) by mouth once daily.    tiZANidine (ZANAFLEX) 4 MG tablet Take 1 tablet (4 mg total) by mouth nightly as needed.    traZODone (DESYREL) 50 MG tablet Take 1 tablet (50 mg total) by mouth every evening.    ALPRAZolam (XANAX) 0.5 MG tablet Take 1 tablet by mouth 1 hour  before the EVLT procedure.  You may take a second tablet right before the procedure; please check with our nurse.     "conjugated estrogens (PREMARIN) vaginal cream Place 1 g vaginally twice a week. Place pea sized amount to vagina twice per day for two weeks then twice per week therafter    dexlansoprazole (DEXILANT) 30 mg CpDM Take 1 capsule (30 mg total) by mouth every 12 (twelve) hours.     Current Facility-Administered Medications   Medication    lidocaine-EPINEPHrine 1%-1:100,000 30 mL, lidocaine HCL 10 mg/ml (1%) 20 mL, sodium bicarbonate 10 mL in sodium chloride 0.9% 500 mL solution    lidocaine-EPINEPHrine 1%-1:100,000 30 mL, lidocaine HCL 10 mg/ml (1%) 20 mL, sodium bicarbonate 10 mL in sodium chloride 0.9% 500 mL solution         Objective Findings:    Vital Signs:  /60   Pulse 78   Ht 5' 6" (1.676 m)   Wt 75.4 kg (166 lb 3.6 oz)   BMI 26.83 kg/m²  Body mass index is 26.83 kg/m².    Physical Exam:   General appearance: alert, cooperative, no distress. + evidence of systemic sclerosis w  narrow oral aperture.   HENT: Normocephalic, atraumatic, neck symmetrical, no nasal discharge  Eyes: conjunctivae/corneas clear,EOM's intact  Lungs: clear to auscultation bilaterally,   Heart: regular rate and rhythm without rub;  Abdomen: soft, non-tender; bowel sounds normoactive; no organomegaly  Extremities: extremities symmetric; no clubbing, cyanosis, or edema, no raynauds.   +sclerodactyly, osteolysis of digits, digital ulcers.  Integument: Skin color, texture, turgor normal; no rashes; hair distrubution normal,  +telangectasia, and tightness of skin.    Neurologic: Alert and oriented X 3, normal strength, normal coordination and gait  Psychiatric: no pressured speech; normal affect; no evidence of impaired cognition        Labs:  Lab Results   Component Value Date    WBC 4.93 11/24/2020    HGB 11.4 (L) 11/24/2020    HCT 36.8 (L) 11/24/2020     11/24/2020    CHOL 125 05/10/2019    TRIG 87 05/10/2019    HDL 43 05/10/2019    ALT 10 11/24/2020    AST 16 11/24/2020     11/24/2020    K 3.7 11/24/2020     " 11/24/2020    CREATININE 0.7 11/24/2020    BUN 16 11/24/2020    CO2 25 11/24/2020    TSH 1.371 12/08/2020    INR 1.0 06/29/2015         Assessment and plan:  This is a 70 y.o. Female with a PMH of  scleroderma, ILD, PHTN here today for the following GI symptoms:    GERD.  Severe GERD on Ph impedance  Ho aspiration pneumonia x 4 and pulmonolgy/rheumatology is concerned it may be due to reflux  Prev well controlled w omeprazole 40 mg daily.   No longer on zantac due to recall.   Worse with pepcid.   No improvement with omeprazole 40 mg BID  -Start dexilant 30mg BID  -Gaviscon w alginate PRN.  Optimize gastroapresis       Dysphagia. Esophageal manometry w scleroderma esophagus  Improvement with dilation in the past.   -Swallowing precautions    Oropharyngeal dysphagia.   MBSS with moderate swallowing dysfunction. No aspiration observed.Recs: Dysphagia therapy, for 4-6 sessions over the course of 4-6 weeks, in order to increase tongue base retraction, increase pharyngeal contraction and increase swallow safety by implementing therapeutic maneuvers. Repeat MBSS in 6-8 weeks for reeval after completion of dysphagia therapy.  -Never went to speech therapy.  Will discuss at next visit     Dry mouth.   -Start biotin    History of  esophageal candida    Gastroparesis. Early satiety.  No nausea or vomiting.   -Start Gp diet    -See gi dietitian   -Will consider promotility agents     Epigastric discomfort. Lower abd discomfort related to BMs  -IBguard prn   -FDgiuard prn      Gas and bloating. distention.   -Avoids artificial sugars.  -eliminate lactose  -Eliminate honey   -Provided handout    Fecal incontinence.  Improved.     ROXANNE. EGD/Colon negative 2019  Start iron BID  -EGD w g/d bx/dissach     Weight gain   -Check TSH   -Fu w PCP     Follow up in about 4 months (around 4/8/2021).      1. Iron deficiency anemia, unspecified iron deficiency anemia type    2. Weight gain    3. Dysphagia, unspecified type    4.  Gastroesophageal reflux disease, unspecified whether esophagitis present    5. Early satiety    6. Bloating    7. Abdominal pain, unspecified abdominal location        Orders Placed This Encounter   Procedures    TSH     Standing Status:   Future     Number of Occurrences:   1     Standing Expiration Date:   2/6/2022    Tissue transglutaminase, IgA     Standing Status:   Future     Number of Occurrences:   1     Standing Expiration Date:   12/8/2021    IgA     Standing Status:   Future     Number of Occurrences:   1     Standing Expiration Date:   12/8/2021    Case Request Endoscopy: ESOPHAGOGASTRODUODENOSCOPY (EGD)     Order Specific Question:   CPT Code:     Answer:   NM EGD, FLEX, DIAGNOSTIC [09671]     Order Specific Question:   Case Referring Provider     Answer:   ELENO AMBRIZ [[id number]]     Order Specific Question:   Medical Necessity:     Answer:   Medically Non-Urgent [100]     Order Specific Question:   Is an on-site pathologist required for this procedure?     Answer:   N/A         Thank you for allowing me to participate in the care of Yamel Ambriz MD

## 2020-12-08 NOTE — PATIENT INSTRUCTIONS
-Start dexilant 30 twice per day   -Stop omeprazole if your insurance covers dexilant   -Use biotin 3-4 times per day as needed for dry mouth. This should improve your difficulty swallowing as saliva is needed to lubricate food.    -Eat small, frequent meals. Avoid large or medium sized meals.  -Never lay down after eating. The food sitting in  our esophagus can regurgitate and end up in your lungs.   -Give yourself 3 hours after eating before laying down.   -Be very careful about taking pills. Pills can spend a long time in your esophagus and cause significant damage. Swallow pills with a large amount of water (8-12 oz) and remain upright for 1 hr after taking them. You may also try getting liquid formulations of your medicines if possible. Compounding pharmacies can make almost any medication in liquid form.   Eat sitting completely upright only as gravity can help empty the esophagus.   Swallow only once per bite. Avoid double swallows as taking a second swallow can diminish esophageal motility. Wait 15 seconds after every swallow before initiating a second swallow.   -Follow up with Gi dietitian to work on gastroparesis diet   -Eliminate all forms of dairy/lactose (milk, cheese, butter, creamers, ice cream etc) from your diet for 14 days to see if your symptoms improve  -Stop honey for 2 weeks to see if your bloating improves  -Try FDguard 1 tablet three times per day as needed for abdominal pain, nausea, satiety, vomiting.  You can purchase this over the counter. Look for coupons on line.   -Try IBguard 1 tablet three times per day as needed for abdominal pain, bloating.  You can purchase this over the counter. Look for coupons on line.   -Your labs show low iron level anemia. Please start over the counter iron 325 (65 Fe) MG twice daily   -Complete EGD to evaluate anemia.  -Check labs   -Follow up with Dr. Rand  To discuss weight gain

## 2020-12-09 ENCOUNTER — HOSPITAL ENCOUNTER (OUTPATIENT)
Dept: VASCULAR SURGERY | Facility: CLINIC | Age: 70
Discharge: HOME | End: 2020-12-09
Attending: SURGERY
Payer: MEDICARE

## 2020-12-09 ENCOUNTER — CLINICAL SUPPORT (OUTPATIENT)
Dept: REHABILITATION | Facility: OTHER | Age: 70
End: 2020-12-09
Attending: NURSE PRACTITIONER
Payer: MEDICARE

## 2020-12-09 ENCOUNTER — OFFICE VISIT (OUTPATIENT)
Dept: VASCULAR SURGERY | Facility: CLINIC | Age: 70
End: 2020-12-09
Payer: MEDICARE

## 2020-12-09 VITALS
BODY MASS INDEX: 27.18 KG/M2 | WEIGHT: 163.13 LBS | SYSTOLIC BLOOD PRESSURE: 127 MMHG | HEART RATE: 70 BPM | DIASTOLIC BLOOD PRESSURE: 57 MMHG | TEMPERATURE: 98 F | HEIGHT: 65 IN

## 2020-12-09 DIAGNOSIS — M54.2 PAINFUL CERVICAL RANGE OF MOTION: Primary | ICD-10-CM

## 2020-12-09 DIAGNOSIS — R29.3 POSTURE ABNORMALITY: ICD-10-CM

## 2020-12-09 DIAGNOSIS — R29.898 WEAKNESS OF BOTH UPPER EXTREMITIES: ICD-10-CM

## 2020-12-09 DIAGNOSIS — I87.2 VENOUS INSUFFICIENCY OF BOTH LOWER EXTREMITIES: Primary | ICD-10-CM

## 2020-12-09 DIAGNOSIS — I87.2 VENOUS INSUFFICIENCY OF BOTH LOWER EXTREMITIES: ICD-10-CM

## 2020-12-09 PROCEDURE — 97110 THERAPEUTIC EXERCISES: CPT | Mod: PN

## 2020-12-09 PROCEDURE — 99214 OFFICE O/P EST MOD 30 MIN: CPT | Mod: PBBFAC,25 | Performed by: SURGERY

## 2020-12-09 PROCEDURE — 99213 PR OFFICE/OUTPT VISIT, EST, LEVL III, 20-29 MIN: ICD-10-PCS | Mod: S$PBB,,, | Performed by: SURGERY

## 2020-12-09 PROCEDURE — 99213 OFFICE O/P EST LOW 20 MIN: CPT | Mod: S$PBB,,, | Performed by: SURGERY

## 2020-12-09 PROCEDURE — 99999 PR PBB SHADOW E&M-EST. PATIENT-LVL IV: ICD-10-PCS | Mod: PBBFAC,,, | Performed by: SURGERY

## 2020-12-09 PROCEDURE — 93971 PR US DUPLEX, UPPER OR LOWER EXT VENOUS,UNILAT OR LTD: ICD-10-PCS | Mod: 26,S$PBB,, | Performed by: SURGERY

## 2020-12-09 PROCEDURE — 99999 PR PBB SHADOW E&M-EST. PATIENT-LVL IV: CPT | Mod: PBBFAC,,, | Performed by: SURGERY

## 2020-12-09 PROCEDURE — 93971 EXTREMITY STUDY: CPT | Mod: 26,S$PBB,, | Performed by: SURGERY

## 2020-12-09 PROCEDURE — 93971 EXTREMITY STUDY: CPT | Mod: PBBFAC | Performed by: SURGERY

## 2020-12-09 NOTE — PROGRESS NOTES
"    Physical Therapy Re-assessment     Name: Yamel Shah  Clinic Number: [id number]    Therapy Diagnosis:   Encounter Diagnoses   Name Primary?    Painful cervical range of motion Yes    Weakness of both upper extremities     Posture abnormality      Physician: Viktoriya Camara NP    Visit Date: 12/9/2020    Physician Orders: PT Eval and Treat      Medical Diagnosis from Referral: M47.812 (ICD-10-CM) - Cervical spondylosis     Evaluation Date: 10/26/2020  Authorization Period Expiration: 10/02/2021  Plan of Care Expiration: 12/21/20 (8 weeks)  Reassessment Completed: 12/09/20  Visit # / Visits authorized: 9/ 20     Time In: 1115  Time Out: 1200  See POC for eval note.   Total Billable Time: 45 minutes    Precautions: Hx of MRSA; Recent Endovenous laser procedure; Scleroderma with pulmonary involvement; Venous and arterial insufficiencies; chronic pain; Hx of fecal incontinence; Hx of contractures; (See medical problem list). Deformed hands/fingers.       Subjective     Yamel returns to PT and reports the neck is getting better. Able to tolerate prolonged sitting more now; easier to turn neck to either way and turn it further; less pain with fwd neck flexion. She has not been on the computer much lately. She feels the exercises are helping in general.     She was compliant with home exercise program.  Response to previous treatment: no adverse effects  Functional change: no change     Prior Level of Function: Unlimited   Current Level of Function: unable to sit to sewe or use computer for a long time; unable to turn or bend head fwd head comfortably  DME owned/used: NA     Pain: Took pain medication today  Current 4/10, worst 6/10, best 3-4/10   Location: right neck  Description: "Pulling/tearing muscle, some sharp"  Aggravating Factors: Prolonged sitting, neck rotation B; bending head down   Easing Factors: pain medication and heating pad  Disturbed Sleep: No     Pattern of pain questions:  1.  Where is " "your pain the worst? R side neck  2.  Is your pain constant or intermittent? Constant  3.  Does bending forward make your typical pain worse? Yes  4.  Since the start of your neck pain, has there been a change in your bowel or bladder? No  5.  What can't you do now that you use to be able to do? Turn head to either sides, bending neck fwd, sitting for a long time at computer        Pts goals: "Feel better; alleviate pain".       Objective      12/09/20  Postural examination/scapula alignment: Rounded shoulder, Head forward and Slouched posture  Joint integrity: Hypomobile cervical spine  Skin integrity: Consistent with venous and arterial condition. Patient with compression wrap at R LE.   Edema: Mild     Cervical Range of Motion    (Bolded=Re-assessment 12/09/20)   Flexion 35 deg ERP==>60 deg less ERP   Extension 30 deg ERP==>30 deg ERP   Side bending Right 10 deg ERP==> 20 deg ERP   Side bending Left 10 deg ERP ==> 20 deg ERP   Rotation Right ERP right 50 deg==> 50 deg no pain   Rotation Left  ERP right 45 deg==> 50 deg No pain   Neck Protraction WFL==>ERP ==>(no change) mild ERP   Neck Retraction WFL==>ERP ==>(no change) mild ERP         Upper Extremity Strength  (R) UE   (L) UE     Shoulder flexion: 5/5 Shoulder flexion: 5/5   Shoulder Abduction: 5/5 Shoulder abduction: 5/5   Elbow flexion: 5/5 Elbow flexion: 5/5   Elbow extension: 5/5 Elbow extension: 5/5   Wrist flexion: +/5 Wrist flexion: 4+/5   Wrist extension: 4+/5 Wrist extension: 4+/5    4/5 : 4/5      NEUROLOGICAL SCREEN     Sensory deficit: WNL B Upper Quarter to light touch     Special Tests: DNT today     Reflexes: Did not test today     REPEATED TEST MOVEMENTS:   Repeated Protraction in Sitting end range pain  worse   Repeated Flexion in Sitting worse  tightness   Repeated Retraction in Sitting  pain during motion  no worse   Repeated Retraction Extension in Sitting worse   Repeated Protraction in lying worse   Repeated Flexion in lying " "worse   Repeated Retraction in lying mild discomfort   Repeated Retraction Extension in lying DNT      GAIT:  Assistive Device used: none  Level of Assistance: independent  Patient displays the following gait deviations:  unsteady gait.        Treatment     Yamel received therapeutic exercises to develop/improve posture, lumbar/cervical ROM, strength and muscular endurance for  45  minutes including the following exercises:   Scap retractions 30x5"   Supine chin tucks 30x  Open book cervical rotations x20 B     Recumbent bike for 5 minutes L 2 to increase blood flow   UT stretch 3x30" B   Supine cervical retractions 2 x 15 x 3"  ( unable to perform seated)Open book (upper trunk/neck rotation SL) 20x ea  Seated no monies/slouch corrections gtb 3 x 10 3" modified with loops for wrists  Rows otb 20x  modified with loops for wrists  Shld ext  otb 20x -modified with loops for wrists    Yamel received the following manual therapy techniques: Manual traction and Soft tissue Mobilization were applied to the: 00 for 00 minutes, including:  NP    Yamel participated in neuromuscular re-education activities to improve: Posture for 00 minutes. The following activities were included:  NP    Yamel received hot pack for 00 minutes to NP.    Yamel received cold pack for 00 minutes to NP.      Home Exercises Provided and Patient Education Provided     Education provided:   - Pt edu on proper exercise technique.  Pt advised to use hot packs with caution making sure not to burn her skin and not to apply anywhere with any loss of sensation.        Written Home Exercises Provided: Patient instructed to cont prior HEP.  Exercises were reviewed and Yamel was able to demonstrate them prior to the end of the session.  Yamel demonstrated good  understanding of the education provided.     See EMR under Patient Instructions for exercises provided 11/6/2020.    Assessment     Patient displayed improvement with " cervical spine AROM, with persistent ERP with some movements, and no ERP with others (Flex, rotation). Bilateral UE MMT strength improved as well; but FOTO score remained unchanged per previous score of 47% impairment/limitation.      Yamel is progressing well towards her goals.   Pt prognosis is Good.       Pt will continue to benefit from skilled outpatient physical therapy to address the deficits listed in the problem list box on initial evaluation, provide pt/family education and to maximize pt's level of independence in the home and community environment.     Pt's spiritual, cultural and educational needs considered and pt agreeable to plan of care and goals.     Anticipated Barriers for therapy: Multiple co-morbidities    Short term goals: 4 weeks    1.  Pt will demonstrate  >50%cervical AROM  W/o increasing ERP by more than 1-2 points MET 12/9/20  2. Pt will initiate home exercise program to improve strength, flexibility, endurance, and neck mobility  to return pt to PLOF (Progressing well)  3. Pt will report improved ability to lift and carry, and sustain good posture while performing ADL's and work w/o  Increasing neck pain    (Progressing well with a little bit of irritation with ability to carry groceries and laundry)     Long term goals: 8 weeks  1. Pt will demonstrate >70% of  cervical AROM without increasing  ERP by more than 1 point,  which results in WFL ROM of neck for ease with ADLs and driving, and working/reading   (Not met, progressing slowly)  2. Pt will demonstrate reduced pain and improved functional outcomes as reported on the FOTO by reaching a limitation score of  20% or less in order to demonstrate subjective improvement in pt's condition.(Not met, progressing slowly)  3. Pt will demonstrate independence with reducing or controlling symptoms with ther ex, movement, or position independently, able to reduce pain at worst at B shoulders 3-4 points overall on pain scale using strategies  taught in therapy (Progressing well)  4. Pt will demonstrate independence with the HEP at discharge. (Progressing well)  5. Pt will demonstrate B UE MMT strength at 4+/5 (MET 12/9/20; continue)      Plan   Cont to progress towards goals set by PT.  Work to increase cervical ROM and posture next visit.          Kenton Snell, PT

## 2020-12-10 ENCOUNTER — OFFICE VISIT (OUTPATIENT)
Dept: ENDOCRINOLOGY | Facility: CLINIC | Age: 70
End: 2020-12-10
Payer: MEDICARE

## 2020-12-10 ENCOUNTER — LAB VISIT (OUTPATIENT)
Dept: LAB | Facility: HOSPITAL | Age: 70
End: 2020-12-10
Payer: MEDICARE

## 2020-12-10 VITALS
BODY MASS INDEX: 27.42 KG/M2 | RESPIRATION RATE: 18 BRPM | HEIGHT: 65 IN | WEIGHT: 164.56 LBS | HEART RATE: 72 BPM | SYSTOLIC BLOOD PRESSURE: 116 MMHG | DIASTOLIC BLOOD PRESSURE: 60 MMHG

## 2020-12-10 DIAGNOSIS — R79.9 ABNORMAL FINDING OF BLOOD CHEMISTRY, UNSPECIFIED: ICD-10-CM

## 2020-12-10 DIAGNOSIS — I10 ESSENTIAL HYPERTENSION: ICD-10-CM

## 2020-12-10 DIAGNOSIS — E55.9 VITAMIN D DEFICIENCY: ICD-10-CM

## 2020-12-10 DIAGNOSIS — E83.51 HYPOCALCEMIA: ICD-10-CM

## 2020-12-10 DIAGNOSIS — I10 ESSENTIAL HYPERTENSION: Primary | ICD-10-CM

## 2020-12-10 DIAGNOSIS — M85.89 OSTEOPENIA OF MULTIPLE SITES: ICD-10-CM

## 2020-12-10 LAB
ESTIMATED AVG GLUCOSE: 108 MG/DL (ref 68–131)
HBA1C MFR BLD HPLC: 5.4 % (ref 4–5.6)
TTG IGA SER-ACNC: 10 UNITS

## 2020-12-10 PROCEDURE — 99203 OFFICE O/P NEW LOW 30 MIN: CPT | Mod: S$PBB,GC,, | Performed by: STUDENT IN AN ORGANIZED HEALTH CARE EDUCATION/TRAINING PROGRAM

## 2020-12-10 PROCEDURE — 83036 HEMOGLOBIN GLYCOSYLATED A1C: CPT

## 2020-12-10 PROCEDURE — 99999 PR PBB SHADOW E&M-EST. PATIENT-LVL V: CPT | Mod: PBBFAC,GC,, | Performed by: STUDENT IN AN ORGANIZED HEALTH CARE EDUCATION/TRAINING PROGRAM

## 2020-12-10 PROCEDURE — 99999 PR PBB SHADOW E&M-EST. PATIENT-LVL V: ICD-10-PCS | Mod: PBBFAC,GC,, | Performed by: STUDENT IN AN ORGANIZED HEALTH CARE EDUCATION/TRAINING PROGRAM

## 2020-12-10 PROCEDURE — 36415 COLL VENOUS BLD VENIPUNCTURE: CPT

## 2020-12-10 PROCEDURE — 99203 PR OFFICE/OUTPT VISIT, NEW, LEVL III, 30-44 MIN: ICD-10-PCS | Mod: S$PBB,GC,, | Performed by: STUDENT IN AN ORGANIZED HEALTH CARE EDUCATION/TRAINING PROGRAM

## 2020-12-10 PROCEDURE — 99215 OFFICE O/P EST HI 40 MIN: CPT | Mod: PBBFAC | Performed by: STUDENT IN AN ORGANIZED HEALTH CARE EDUCATION/TRAINING PROGRAM

## 2020-12-10 NOTE — ASSESSMENT & PLAN NOTE
Found to have calcium 8.5 on chemistry  However corrected with albumin is 8.9 which is normal  Ionized calcium is normal  No symptoms of hypocalcemia  Celiac testing obtained by GI, she has normal vitamin D  PTH is mildly elevated which can be in the setting of low calcium diet  Counseled on intake of calcium of at least 1,000 mg daily - list of foods rich in calcium given, she needs to adjust based on GERD/scleroderma diet

## 2020-12-10 NOTE — ASSESSMENT & PLAN NOTE
DXA from Sep 2020 osteopenia, FRAX does not suggest treatment  Continue vitamin D  Calcium 1,000 daily - [email or web address] recommendations given  DXA in 2022

## 2020-12-10 NOTE — PROGRESS NOTES
Subjective:      Patient ID: Dana Shah is a 70 y.o. female.    Chief Complaint:  Hypocalcemia      History of Present Illness  This is a 70 y.o. female. with a past medical history of systemic sclerosis, ILD, pulmonary HTN, GERD here referred by Dr. Gretchen Madison for hypocalcemia    With regards to hypocalcemia  Noted calcium low as per reference range in Sep 2020  However, corrected calcium 8.9-9.0 mg/dL which is normal  Ionized calcium normal  No perioral numbness or cramping     5/4/2020 10:06 8/6/2020 08:35 9/8/2020 09:34 11/24/2020 12:39 11/24/2020 12:39   Calcium 9.1 8.9 8.5 (L) 8.6 (L) 8.6 (L)   Ionized Calcium    1.19    Phosphorus    3.2    Magnesium    2.0    Albumin 3.7 3.7 3.5 3.5      Results for DANA SHAH (MRN [id number]) as of 12/10/2020 09:10   Ref. Range 6/1/2018 09:18 5/10/2019 08:55 11/6/2020 15:35   Vit D, 25-Hydroxy Latest Ref Range: 30 - 96 ng/mL 35 31 38     PTH 87    DXA Sep 2020  Lumbar spine (L1-L4):   BMD is 0.830 g/cm2, T-score is -2.0, and Z-score is 0.1.  Total hip:                    BMD is 0.753 g/cm2, T-score is -1.6, and Z-score is -0.2.  Femoral neck:             BMD is 0.606 g/cm2, T-score is -2.2, and Z-score is -0.6.  There is a 9.1% risk of a major osteoporotic fracture and a 1.9% risk of hip fracture in the next 10 years (FRAX adjusted for TBS).      Calcium intake is very low as she was advised to avoid dairy due to GERD/scleroderma       Review of Systems   Constitutional: Negative for unexpected weight change.   Eyes: Negative for visual disturbance.   Respiratory: Negative for shortness of breath.    Cardiovascular: Negative for chest pain.   Gastrointestinal: Negative for abdominal pain.   Musculoskeletal: Negative for arthralgias.   Skin: Negative for wound.   Allergic/Immunologic: Negative for food allergies.   Neurological: Negative for numbness and headaches.   Hematological: Does not bruise/bleed easily.       Social and family history  "reviewed  Current medications and allergies reviewed    Objective:   /60   Pulse 72   Resp 18   Ht 5' 5" (1.651 m)   Wt 74.6 kg (164 lb 9.2 oz)   BMI 27.39 kg/m²   Physical Exam  Constitutional:       Appearance: She is well-developed.   HENT:      Head: Normocephalic and atraumatic.   Neck:      Musculoskeletal: Neck supple.      Thyroid: No thyromegaly.   Cardiovascular:      Rate and Rhythm: Normal rate and regular rhythm.      Heart sounds: Normal heart sounds.   Pulmonary:      Effort: Pulmonary effort is normal. No respiratory distress.   Abdominal:      Palpations: Abdomen is soft.      Tenderness: There is no abdominal tenderness.   Skin:     General: Skin is warm.      Findings: No rash.      Comments: +sclerodactyly, osteolysis of digits, digital ulcers   Neurological:      Mental Status: She is alert and oriented to person, place, and time.   Psychiatric:         Behavior: Behavior normal.       BP Readings from Last 1 Encounters:   12/10/20 116/60      Wt Readings from Last 1 Encounters:   12/10/20 0900 74.6 kg (164 lb 9.2 oz)     Body mass index is 27.39 kg/m².    Lab Review:   Lab Results   Component Value Date    HGBA1C 5.4 12/10/2020     Lab Results   Component Value Date    CHOL 125 05/10/2019    HDL 43 05/10/2019    LDLCALC 64.6 05/10/2019    TRIG 87 05/10/2019    CHOLHDL 34.4 05/10/2019     Lab Results   Component Value Date     11/24/2020    K 3.7 11/24/2020     11/24/2020    CO2 25 11/24/2020     (H) 11/24/2020    BUN 16 11/24/2020    CREATININE 0.7 11/24/2020    CALCIUM 8.6 (L) 11/24/2020    CALCIUM 8.6 (L) 11/24/2020    PROT 8.0 11/24/2020    ALBUMIN 3.5 11/24/2020    BILITOT 0.3 11/24/2020    ALKPHOS 135 11/24/2020    AST 16 11/24/2020    ALT 10 11/24/2020    ANIONGAP 7 (L) 11/24/2020    ESTGFRAFRICA >60.0 11/24/2020    EGFRNONAA >60.0 11/24/2020    TSH 1.371 12/08/2020       All pertinent labs reviewed    Assessment and Plan     Abnormal finding of blood " chemistry, unspecified   Found to have calcium 8.5 on chemistry  However corrected with albumin is 8.9 which is normal  Ionized calcium is normal  No symptoms of hypocalcemia  Celiac testing obtained by GI, she has normal vitamin D  PTH is mildly elevated which can be in the setting of low calcium diet  Counseled on intake of calcium of at least 1,000 mg daily - list of foods rich in calcium given, she needs to adjust based on GERD/scleroderma diet      Osteopenia  DXA from Sep 2020 osteopenia, FRAX does not suggest treatment  Continue vitamin D  Calcium 1,000 daily - [email or web address] recommendations given  DXA in 2022    Vitamin D deficiency  Vitamin D levels at goal  Continue ergo 50K weekly    Follow up PRN    Isaac Leyva MD  Endocrinology Fellow    I, Andreina Smith MD,  have personally taken the history and examined the patient and agree with the resident's note as stated above.

## 2020-12-11 ENCOUNTER — OFFICE VISIT (OUTPATIENT)
Dept: PULMONOLOGY | Facility: CLINIC | Age: 70
End: 2020-12-11
Payer: MEDICARE

## 2020-12-11 VITALS
DIASTOLIC BLOOD PRESSURE: 62 MMHG | WEIGHT: 162.69 LBS | OXYGEN SATURATION: 98 % | HEIGHT: 65 IN | SYSTOLIC BLOOD PRESSURE: 124 MMHG | HEART RATE: 70 BPM | BODY MASS INDEX: 27.1 KG/M2

## 2020-12-11 DIAGNOSIS — J84.9 ILD (INTERSTITIAL LUNG DISEASE): Primary | ICD-10-CM

## 2020-12-11 PROCEDURE — 99214 OFFICE O/P EST MOD 30 MIN: CPT | Mod: PBBFAC | Performed by: INTERNAL MEDICINE

## 2020-12-11 PROCEDURE — 99214 PR OFFICE/OUTPT VISIT, EST, LEVL IV, 30-39 MIN: ICD-10-PCS | Mod: S$PBB,GC,, | Performed by: INTERNAL MEDICINE

## 2020-12-11 PROCEDURE — 99999 PR PBB SHADOW E&M-EST. PATIENT-LVL IV: CPT | Mod: PBBFAC,GC,, | Performed by: INTERNAL MEDICINE

## 2020-12-11 PROCEDURE — 99214 OFFICE O/P EST MOD 30 MIN: CPT | Mod: S$PBB,GC,, | Performed by: INTERNAL MEDICINE

## 2020-12-11 PROCEDURE — 99999 PR PBB SHADOW E&M-EST. PATIENT-LVL IV: ICD-10-PCS | Mod: PBBFAC,GC,, | Performed by: INTERNAL MEDICINE

## 2020-12-11 RX ORDER — ALBUTEROL SULFATE 90 UG/1
2 AEROSOL, METERED RESPIRATORY (INHALATION) EVERY 4 HOURS PRN
Qty: 18 G | Refills: 1 | Status: SHIPPED | OUTPATIENT
Start: 2020-12-11 | End: 2022-12-05 | Stop reason: SDUPTHER

## 2020-12-11 NOTE — PROGRESS NOTES
Subjective:       Patient ID: Yamel Shah is a 70 y.o. female.    Chief Complaint: No chief complaint on file.    Ms. Shah is a 69yo F with PMHx significant for Systemic scleroderma (+SSA, +SCL-70, -RNAP3, ILD) and ILD with associated dysphagia that presents to Pulmonary clinic for follow-up. Last seen in Pulmonary Clinic in June 2020 with Dr. Leigh. During that clinic visit there was discussion about starting her on Ofev for her ILD, however decision was made to reassess as she has had no progression of her symptoms or disease. This clinic visit she reports that she is doing well -- denies dyspnea, cough, wheezing, NEW, etc. Overall from a respiratory status she is stable. Does express some concerns for aspiration, however on review of MBBS, no aspiration events. Discussed her lifestyle habits around meals and appropriate way to take PPI/H2 blockers.     Review of Systems   Constitutional: Negative for weight loss and weight gain.   Respiratory: Positive for pleurisy. Negative for cough, sputum production, chest tightness, shortness of breath, wheezing, asthma nighttime symptoms and use of rescue inhaler.        Objective:      Physical Exam   Constitutional: She appears well-developed and well-nourished. She appears not cachectic. She is not obese.   Cardiovascular: Normal rate and regular rhythm.   Pulmonary/Chest:   Crackles in the bases   Skin:   Tightened skin with deformities over bilateral digits    Vitals reviewed.    Personal Diagnostic Review  CT Chest 3/2019  IMPRESSION:      Stable appearing chronic interstitial lung disease consistent with patient's known history of scleroderma; it is worth noting that there is new tree-in-bud opacity noted in the right upper lobe which while nonspecific can be seen in the setting of inflammatory infectious processes such as aspiration.     Persistently fluid-filled esophagus which can place the patient at risk for aspiration        No flowsheet data  found.      Assessment:       No diagnosis found.    Outpatient Encounter Medications as of 12/11/2020   Medication Sig Dispense Refill    albuterol (VENTOLIN HFA) 90 mcg/actuation inhaler Inhale 2 puffs into the lungs every 4 (four) hours as needed for Wheezing. Rescue 18 g 1    carboxymethylcellulose sodium (REFRESH TEARS) 0.5 % Drop Apply 1-2 drops to every as needed      dexlansoprazole (DEXILANT) 30 mg CpDM Take 1 capsule (30 mg total) by mouth every 12 (twelve) hours. 90 capsule 3    ergocalciferol (ERGOCALCIFEROL) 50,000 unit Cap Take 1 capsule (50,000 Units total) by mouth every 7 days. 12 capsule 1    meloxicam (MOBIC) 7.5 MG tablet Take 1 tablet (7.5 mg total) by mouth once daily. 7 tablet 0    multivitamin capsule Take 1 capsule by mouth once daily.       mycophenolate mofetil (CELLCEPT) 200 mg/mL SusR Take 3.75 mLs (750 mg total) by mouth 2 (two) times daily. 480 mL 1    NIFEdipine (ADALAT CC) 90 MG TbSR TAKE 1 TABLET(90 MG) BY MOUTH EVERY DAY 90 tablet 3    omeprazole (PRILOSEC) 40 MG capsule TAKE 1 CAPSULE(40 MG) BY MOUTH TWICE DAILY BEFORE MEALS 180 capsule 11    pravastatin (PRAVACHOL) 20 MG tablet TAKE 1 TABLET BY MOUTH EVERY EVENING 90 tablet 0    pregabalin (LYRICA) 300 MG Cap Take 1 capsule (300 mg total) by mouth once daily. 30 capsule 6    PREVIDENT 5000 SENSITIVE 1.1-5 % Pste BRUSH FOR 2 MINUTES TWICE PER DAY      tadalafil (ADCIRCA) 20 mg Tab Take 2 tablets (40 mg total) by mouth once daily. 28 tablet 11    tiZANidine (ZANAFLEX) 4 MG tablet Take 1 tablet (4 mg total) by mouth nightly as needed. 30 tablet 3    traZODone (DESYREL) 50 MG tablet Take 1 tablet (50 mg total) by mouth every evening. 30 tablet 4    ALPRAZolam (XANAX) 0.5 MG tablet Take 1 tablet by mouth 1 hour  before the EVLT procedure.  You may take a second tablet right before the procedure; please check with our nurse. (Patient not taking: Reported on 12/10/2020) 2 tablet 0    conjugated estrogens (PREMARIN)  vaginal cream Place 1 g vaginally twice a week. Place pea sized amount to vagina twice per day for two weeks then twice per week therafter 30 g 12    [DISCONTINUED] omeprazole (PRILOSEC) 40 MG capsule        Facility-Administered Encounter Medications as of 12/11/2020   Medication Dose Route Frequency Provider Last Rate Last Dose    lidocaine-EPINEPHrine 1%-1:100,000 30 mL, lidocaine HCL 10 mg/ml (1%) 20 mL, sodium bicarbonate 10 mL in sodium chloride 0.9% 500 mL solution   MISCELLANEOUS 1 time in Clinic/HOD Timmy Claudio MD        lidocaine-EPINEPHrine 1%-1:100,000 30 mL, lidocaine HCL 10 mg/ml (1%) 20 mL, sodium bicarbonate 10 mL in sodium chloride 0.9% 500 mL solution   MISCELLANEOUS 1 time in Clinic/HOD Timmy Claudio MD         No orders of the defined types were placed in this encounter.      Assessment and Plan:   #ILD  Due to SSc-ILD.  Continue with MMF. FVC and DLCO have previously been stable.  Will need repeat testing.   PFTs with [facility] prior to next clinic visit.         This case was discussed with staff, Dr. Sanjiv Merlos.           Ludmila Hannon M.D., PGY - V  [employer] Pulmonary/Critical Care Fellow

## 2020-12-14 ENCOUNTER — CLINICAL SUPPORT (OUTPATIENT)
Dept: REHABILITATION | Facility: OTHER | Age: 70
End: 2020-12-14
Attending: NURSE PRACTITIONER
Payer: MEDICARE

## 2020-12-14 DIAGNOSIS — M54.2 PAINFUL CERVICAL RANGE OF MOTION: ICD-10-CM

## 2020-12-14 DIAGNOSIS — R29.3 POSTURE ABNORMALITY: ICD-10-CM

## 2020-12-14 DIAGNOSIS — R29.898 WEAKNESS OF BOTH UPPER EXTREMITIES: ICD-10-CM

## 2020-12-14 PROCEDURE — 97110 THERAPEUTIC EXERCISES: CPT | Mod: PN,CQ

## 2020-12-14 NOTE — PROGRESS NOTES
"    Physical Therapy Re-assessment     Name: Yamel Shah  Clinic Number: [id number]    Therapy Diagnosis:   Encounter Diagnoses   Name Primary?    Painful cervical range of motion     Weakness of both upper extremities     Posture abnormality      Physician: Viktoriya Camara NP    Visit Date: 12/14/2020    Physician Orders: PT Eval and Treat      Medical Diagnosis from Referral: M47.812 (ICD-10-CM) - Cervical spondylosis     Evaluation Date: 10/26/2020  Authorization Period Expiration: 10/02/2021  Plan of Care Expiration: 12/21/20 (8 weeks)  Reassessment Completed: 12/09/20  Visit # / Visits authorized: 10/ 20     Time In: 1345  Time Out: 1419  See POC for eval note.   Total Billable Time: 34 minutes    Precautions: Hx of MRSA; Recent Endovenous laser procedure; Scleroderma with pulmonary involvement; Venous and arterial insufficiencies; chronic pain; Hx of fecal incontinence; Hx of contractures; (See medical problem list). Deformed hands/fingers.       Subjective     Pt reports w/ no c/o pn in neck.  Pt mentioned her wrist is feeling better as well.  Pt asked to refrain from exercises involving her hands today.     She was compliant with home exercise program.  Response to previous treatment: no adverse effects  Functional change: no change     Prior Level of Function: Unlimited   Current Level of Function: unable to sit to sewe or use computer for a long time; unable to turn or bend head fwd head comfortably  DME owned/used: NA     Pain: Took pain medication today  Current 4/10, worst 6/10, best 3-4/10   Location: right neck  Description: "Pulling/tearing muscle, some sharp"  Aggravating Factors: Prolonged sitting, neck rotation B; bending head down   Easing Factors: pain medication and heating pad  Disturbed Sleep: No     Pattern of pain questions:  1.  Where is your pain the worst? R side neck  2.  Is your pain constant or intermittent? Constant  3.  Does bending forward make your typical pain worse? " "Yes  4.  Since the start of your neck pain, has there been a change in your bowel or bladder? No  5.  What can't you do now that you use to be able to do? Turn head to either sides, bending neck fwd, sitting for a long time at computer        Pts goals: "Feel better; alleviate pain".       Objective      Yamel received therapeutic exercises to develop/improve posture, lumbar/cervical ROM, strength and muscular endurance for 34  minutes including the following exercises:   Scap retractions 30x5"   Supine chin tucks 30x  Supine cervical Rotations as jelly 20 x ea   Open book cervical rotations x20 B 3 sec hold   Seated marching for posture 2 x 30     Recumbent bike for 5 minutes L 2 to increase blood flow   UT stretch 3x30" B   Supine cervical retractions 2 x 15 x 3"  ( unable to perform seated)Open book (upper trunk/neck rotation SL) 20x ea  Seated no monies/slouch corrections gtb 3 x 10 3" modified with loops for wrists  Rows otb 20x  modified with loops for wrists  Shld ext  otb 20x -modified with loops for wrists    Yamel received the following manual therapy techniques: Manual traction and Soft tissue Mobilization were applied to the: 00 for 00 minutes, including:  NP    Yamel participated in neuromuscular re-education activities to improve: Posture for 00 minutes. The following activities were included:  NP    Yamel received hot pack for 00 minutes to NP.    Yamel received cold pack for 00 minutes to NP.      Home Exercises Provided and Patient Education Provided     Education provided:   - Pt edu on proper exercise technique.        Written Home Exercises Provided: Patient instructed to cont prior HEP.  Exercises were reviewed and Yamel was able to demonstrate them prior to the end of the session.  Yamel demonstrated good  understanding of the education provided.     See EMR under Patient Instructions for exercises provided 11/6/2020.    Assessment   Pt displayed increased muscular " endurance during therex.  Pt cont to lack some scap control and cervical mobility.  Pt had no adverse effects from t.x.        Yamel is progressing well towards her goals.   Pt prognosis is Good.       Pt will continue to benefit from skilled outpatient physical therapy to address the deficits listed in the problem list box on initial evaluation, provide pt/family education and to maximize pt's level of independence in the home and community environment.     Pt's spiritual, cultural and educational needs considered and pt agreeable to plan of care and goals.     Anticipated Barriers for therapy: Multiple co-morbidities    Short term goals: 4 weeks    1.  Pt will demonstrate  >50%cervical AROM  W/o increasing ERP by more than 1-2 points MET 12/9/20  2. Pt will initiate home exercise program to improve strength, flexibility, endurance, and neck mobility  to return pt to PLOF (Progressing well)  3. Pt will report improved ability to lift and carry, and sustain good posture while performing ADL's and work w/o  Increasing neck pain    (Progressing well with a little bit of irritation with ability to carry groceries and laundry)     Long term goals: 8 weeks  1. Pt will demonstrate >70% of  cervical AROM without increasing  ERP by more than 1 point,  which results in WFL ROM of neck for ease with ADLs and driving, and working/reading   (Not met, progressing slowly)  2. Pt will demonstrate reduced pain and improved functional outcomes as reported on the FOTO by reaching a limitation score of  20% or less in order to demonstrate subjective improvement in pt's condition.(Not met, progressing slowly)  3. Pt will demonstrate independence with reducing or controlling symptoms with ther ex, movement, or position independently, able to reduce pain at worst at B shoulders 3-4 points overall on pain scale using strategies taught in therapy (Progressing well)  4. Pt will demonstrate independence with the HEP at discharge.  (Progressing well)  5. Pt will demonstrate B UE MMT strength at 4+/5 (MET 12/9/20; continue)      Plan   Cont to progress towards goals set by PT.  Cont to improve cervical ROM and posture next visit.          Jerel Orona, PTA

## 2020-12-15 ENCOUNTER — HOSPITAL ENCOUNTER (OUTPATIENT)
Dept: PULMONOLOGY | Facility: CLINIC | Age: 70
Discharge: HOME | End: 2020-12-15
Payer: MEDICARE

## 2020-12-15 ENCOUNTER — OFFICE VISIT (OUTPATIENT)
Dept: TRANSPLANT | Facility: CLINIC | Age: 70
End: 2020-12-15
Payer: MEDICARE

## 2020-12-15 VITALS — HEIGHT: 65 IN | BODY MASS INDEX: 26.66 KG/M2 | WEIGHT: 160 LBS

## 2020-12-15 VITALS
DIASTOLIC BLOOD PRESSURE: 60 MMHG | SYSTOLIC BLOOD PRESSURE: 137 MMHG | HEIGHT: 65 IN | HEART RATE: 64 BPM | WEIGHT: 161.81 LBS | BODY MASS INDEX: 26.96 KG/M2

## 2020-12-15 DIAGNOSIS — I10 ESSENTIAL HYPERTENSION: ICD-10-CM

## 2020-12-15 DIAGNOSIS — J84.9 ILD (INTERSTITIAL LUNG DISEASE): ICD-10-CM

## 2020-12-15 DIAGNOSIS — Z01.818 PREOP TESTING: ICD-10-CM

## 2020-12-15 DIAGNOSIS — I27.20 PULMONARY HYPERTENSION: ICD-10-CM

## 2020-12-15 DIAGNOSIS — I73.00 RAYNAUD'S DISEASE WITHOUT GANGRENE: ICD-10-CM

## 2020-12-15 DIAGNOSIS — D64.89 ANEMIA DUE TO OTHER CAUSE, NOT CLASSIFIED: ICD-10-CM

## 2020-12-15 DIAGNOSIS — I27.29 PULMONARY HYPERTENSION ASSOCIATED WITH SYSTEMIC DISORDER: Primary | Chronic | ICD-10-CM

## 2020-12-15 PROCEDURE — 94618 PULMONARY STRESS TESTING: CPT | Mod: 26,S$PBB,, | Performed by: INTERNAL MEDICINE

## 2020-12-15 PROCEDURE — 99214 OFFICE O/P EST MOD 30 MIN: CPT | Mod: S$PBB,,, | Performed by: INTERNAL MEDICINE

## 2020-12-15 PROCEDURE — 94618 PULMONARY STRESS TESTING: ICD-10-PCS | Mod: 26,S$PBB,, | Performed by: INTERNAL MEDICINE

## 2020-12-15 PROCEDURE — 99214 PR OFFICE/OUTPT VISIT, EST, LEVL IV, 30-39 MIN: ICD-10-PCS | Mod: S$PBB,,, | Performed by: INTERNAL MEDICINE

## 2020-12-15 PROCEDURE — 99215 OFFICE O/P EST HI 40 MIN: CPT | Mod: PBBFAC,25 | Performed by: INTERNAL MEDICINE

## 2020-12-15 PROCEDURE — 99999 PR PBB SHADOW E&M-EST. PATIENT-LVL V: CPT | Mod: PBBFAC,,, | Performed by: INTERNAL MEDICINE

## 2020-12-15 PROCEDURE — 99999 PR PBB SHADOW E&M-EST. PATIENT-LVL V: ICD-10-PCS | Mod: PBBFAC,,, | Performed by: INTERNAL MEDICINE

## 2020-12-15 PROCEDURE — 94618 PULMONARY STRESS TESTING: CPT | Mod: PBBFAC | Performed by: INTERNAL MEDICINE

## 2020-12-15 RX ORDER — HYDROCHLOROTHIAZIDE 12.5 MG/1
12.5 CAPSULE ORAL DAILY
Qty: 30 CAPSULE | Refills: 11 | Status: SHIPPED | OUTPATIENT
Start: 2020-12-15 | End: 2021-11-12

## 2020-12-15 RX ORDER — HYDROCHLOROTHIAZIDE 12.5 MG/1
12.5 CAPSULE ORAL DAILY
Qty: 30 CAPSULE | Refills: 11 | Status: SHIPPED | OUTPATIENT
Start: 2020-12-15 | End: 2020-12-15 | Stop reason: SDUPTHER

## 2020-12-15 NOTE — PATIENT INSTRUCTIONS
Start hydrochlorothiazide 12.5mg one tablet daily.     Repeat lab 1 week.     Can eat a banana a day or other higher potassium food.     Check blood pressure at home, keep an eye on it, see you feel with adding this medication.

## 2020-12-15 NOTE — PROGRESS NOTES
Subjective:   Pulmonary hypertension    HPI:  Ms. Shah is a 70 y.o. year old     or    or Other  female who has presents for pulmonary hypertension. She was diagnosed with scleroderma in 1983, followed by Dr. Lim (and previously Dr. Boudreaux). She was on revatio in 2013, then switched to adcirca 40mg po daily, which she has remained on every since. pateint states she has minimal swelling in legs, more of a tightness feeling, had been on lasix in the past, but was taken off of it due to issues with other meds. Is asking if she can resume them. Is on nifedipine which can lead to some leg swelling. Would like to try another one if possible, to see if she does better with it. Home blood pressures running similar to today, 130s or so. Breathing is stable, but does not walk fast. 6MWT today was similar to what she did previously.     12/15/20: 6MWT 1380 feet, 420 meters, 95% starting, not able to get with walking, 99% after recoveryHR 65-94-71  //64    TTE 09/18/20:  · Normal left ventricular systolic function. The estimated ejection fraction is 60%.  · Indeterminate left ventricular diastolic function.  · No wall motion abnormalities.  · Normal right ventricular systolic function.  · Moderate biatrial enlargement.  · Mild mitral regurgitation.  · Mild tricuspid regurgitation.  · The estimated PA systolic pressure is 40 mmHg.  · Intermediate central venous pressure (8 mmHg).    PFTs 09/11/20:  DLCO % predicted 62  FEV1/FVC 99.8, post 83    EKG 09/06/19:  Normal sinus rhythm  Left atrial enlargement  Left anterior fascicular (deniz) block  Cannot rule out Anterior infarct (cited on or before 06-SEP-2019)  Abnormal ECG  When compared with    CT chest/abdomen without contrast 03/13/19:     Stable appearing chronic interstitial lung disease consistent with patient's known history of scleroderma; it is worth noting that there is new tree-in-bud  opacity noted in the right upper lobe which while nonspecific can be seen in the setting of inflammatory infectious processes such as aspiration.     Persistently fluid-filled esophagus which can place the patient at risk for aspiration     RHC 06/29/15:  AOSAT: 97   FICKCI: 3.42   FICKCO: 6.06   PAPRES: 35/12 (21)   PASAT: 73   PVR: 1.98   PWPRES: 12/12 (9)   RAPRES: 11/9 (5)   RVPRES: 31/1, 6     DSE 02/14/14:    1 - Normal left ventricular systolic function (EF 55-60%).     2 - Normal right ventricular systolic function .     3 - Normal left ventricular diastolic function.     4 - Mild left atrial enlargement.     No evidence of stress induced myocardial ischemia. Sensitivity is impaired due to failure to reach target heart rate.     Past Medical History:   Diagnosis Date    Abnormal Pap smear     Acid reflux     Allergy     Arthritis     GERD (gastroesophageal reflux disease)     History of migraine headaches     Hypertension     Idiopathic neuropathy 7/20/2012    ILD (interstitial lung disease) 11/6/2013    Iron deficiency anemia 3/18/2014    MRSA carrier     Osteopenia     Pneumonia     Pulmonary fibrosis     Pulmonary hypertension     Raynaud's disease     Scleroderma, diffuse     Sjogren's syndrome     Vitamin D deficiency 11/14/2013     Past Surgical History:   Procedure Laterality Date    24 HOUR IMPEDANCE PH MONITORING OF ESOPHAGUS IN PATIENT NOT TAKING ACID REDUCING MEDICATIONS N/A 3/4/2020    Procedure: IMPEDANCE PH STUDY, ESOPHAGEAL, 24 HOUR, IN PATIENT NOT TAKING ACID REDUCING MEDICATION;  Surgeon: Annamaria Mendoza MD;  Location: [address]);  Service: Endoscopy;  Laterality: N/A;  OFF PPI/H2 Blocker   Motility Studies   Hold Narcotics x 1 days   Hold TCA x 1 days  2/26 - LVM attempting to confirm appt  2/27 - Confirmed appt    BREAST BIOPSY      Left, benign    CERVICAL CONIZATION   W/ LASER  1970    COLONOSCOPY      COLONOSCOPY N/A 3/29/2019    Procedure: COLONOSCOPY;   Surgeon: Annamaria Mendoza MD;  Location: [facility] (2ND FLR);  Service: Endoscopy;  Laterality: N/A;    DILATION AND CURETTAGE OF UTERUS      ESOPHAGEAL MANOMETRY WITH MEASUREMENT OF IMPEDANCE N/A 3/4/2020    Procedure: MANOMETRY, ESOPHAGUS, WITH IMPEDANCE MEASUREMENT;  Surgeon: Annamaria Mendoza MD;  Location: [facility] ENDO (4TH FLR);  Service: Endoscopy;  Laterality: N/A;  OFF PPI/H2 Blocker   Motility Studies   Hold Narcotics x 1 days   Hold TCA x 1 days    ESOPHAGOGASTRODUODENOSCOPY      ESOPHAGOGASTRODUODENOSCOPY N/A 3/29/2019    Procedure: EGD (ESOPHAGOGASTRODUODENOSCOPY);  Surgeon: Annamaria Mendoza MD;  Location: [facility] ENDO (2ND FLR);  Service: Endoscopy;  Laterality: N/A;  pulmonary htn    HYSTERECTOMY  1990    FRANDY (AUB, Fibroids), ovaries remain       Family History   Problem Relation Age of Onset    Breast cancer Mother     No Known Problems Daughter     No Known Problems Son     No Known Problems Daughter     No Known Problems Son     Breast cancer Sister     Breast cancer Maternal Aunt     Osteoarthritis Brother     Melanoma Neg Hx     Colon cancer Neg Hx     Crohn's disease Neg Hx     Stomach cancer Neg Hx     Ulcerative colitis Neg Hx     Rectal cancer Neg Hx     Irritable bowel syndrome Neg Hx     Esophageal cancer Neg Hx     Celiac disease Neg Hx     Ovarian cancer Neg Hx        Review of Systems   Constitution: Positive for malaise/fatigue. Negative for chills, decreased appetite, diaphoresis, fever, weight gain and weight loss.   HENT: Negative for congestion.    Eyes: Negative for blurred vision and visual disturbance.   Cardiovascular: Positive for dyspnea on exertion and leg swelling. Negative for chest pain, irregular heartbeat, near-syncope, orthopnea, palpitations, paroxysmal nocturnal dyspnea and syncope.   Respiratory: Positive for shortness of breath. Negative for cough, sleep disturbances due to breathing, snoring and wheezing.    Hematologic/Lymphatic: Negative for  "bleeding problem. Does not bruise/bleed easily.   Skin: Positive for poor wound healing. Negative for rash.   Musculoskeletal: Positive for joint pain. Negative for arthritis and muscle weakness.   Gastrointestinal: Negative for bloating, abdominal pain, anorexia, constipation, diarrhea, hematemesis, hematochezia, melena, nausea and vomiting.   Genitourinary: Negative for frequency and urgency.   Neurological: Negative for difficulty with concentration, excessive daytime sleepiness, dizziness, headaches, light-headedness and weakness.   Psychiatric/Behavioral: Negative for depression. The patient does not have insomnia and is not nervous/anxious.        Objective:   Blood pressure 137/60, pulse 64, height 5' 5" (1.651 m), weight 73.4 kg (161 lb 13.1 oz).body mass index is 26.93 kg/m².    Physical Exam  Vitals signs and nursing note reviewed.   Constitutional:       General: She is not in acute distress.     Appearance: She is well-developed. She is not diaphoretic.   HENT:      Head: Normocephalic and atraumatic.   Eyes:      General:         Right eye: No discharge.         Left eye: No discharge.      Conjunctiva/sclera: Conjunctivae normal.   Neck:      Musculoskeletal: Normal range of motion and neck supple.      Vascular: No JVD.   Cardiovascular:      Rate and Rhythm: Normal rate and regular rhythm.      Heart sounds: No murmur. No friction rub. No gallop.    Pulmonary:      Effort: Pulmonary effort is normal.      Breath sounds: Normal breath sounds. No wheezing or rales.   Chest:      Chest wall: No tenderness.   Abdominal:      General: Bowel sounds are normal. There is no distension.      Palpations: Abdomen is soft. There is no mass.      Tenderness: There is no abdominal tenderness. There is no guarding or rebound.   Musculoskeletal: Normal range of motion.         General: No tenderness.      Comments: Digits on hansd with some amputations, some skin breakdown.    Skin:     General: Skin is warm and " dry.      Findings: No erythema or rash.      Comments: Tightness of skin over hands and face noted   Neurological:      Mental Status: She is alert and oriented to person, place, and time.   Psychiatric:         Behavior: Behavior normal.         Thought Content: Thought content normal.         Judgment: Judgment normal.         Labs:      Chemistry        Component Value Date/Time     12/15/2020 0805    K 3.9 12/15/2020 0805     12/15/2020 0805    CO2 24 12/15/2020 0805    BUN 17 12/15/2020 0805    CREATININE 0.7 12/15/2020 0805     12/15/2020 0805        Component Value Date/Time    CALCIUM 8.6 (L) 12/15/2020 0805    ALKPHOS 113 12/15/2020 0805    AST 18 12/15/2020 0805    ALT 11 12/15/2020 0805    BILITOT 0.3 12/15/2020 0805    ESTGFRAFRICA >60.0 12/15/2020 0805    EGFRNONAA >60.0 12/15/2020 0805          Magnesium   Date Value Ref Range Status   12/15/2020 2.0 1.6 - 2.6 mg/dL Final     Lab Results   Component Value Date    WBC 5.96 12/15/2020    HGB 11.3 (L) 12/15/2020    HCT 36.5 (L) 12/15/2020    MCV 89 12/15/2020     12/15/2020     BNP   Date Value Ref Range Status   12/15/2020 50 0 - 99 pg/mL Final     Comment:     Values of less than 100 pg/ml are consistent with non-CHF populations.   09/09/2019 36 0 - 99 pg/mL Final     Comment:     Values of less than 100 pg/ml are consistent with non-CHF populations.   09/06/2019 42 0 - 99 pg/mL Final     Comment:     Values of less than 100 pg/ml are consistent with non-CHF populations.     No results found for this or any previous visit.    Labs were reviewed with the patient.    Assessment:      1. Pulmonary hypertension associated with systemic disorder    2. Pulmonary hypertension    3. ILD (interstitial lung disease)    4. Raynaud's disease without gangrene    5. Essential hypertension        Plan:   Patient some decrease in DLCO noted on PFTs. Last CT was stable from 2019. That being said, will repeat CT chest and proceed with repeat  RHC with covid pretest.  Start HCTZ 12.5mg po daily.  Repeat labs 1 week to see how she does on HCTZ.   Recommend 2 gram sodium restriction and 1500cc fluid restriction.  Encourage physical activity with graded exercise program.  Requested patient to weigh themselves daily, and to notify us if their weight increases by more than 3 lbs in 1 day or 5 lbs in 1 week.    Tamanna Martins MD

## 2020-12-16 ENCOUNTER — CLINICAL SUPPORT (OUTPATIENT)
Dept: REHABILITATION | Facility: OTHER | Age: 70
End: 2020-12-16
Attending: NURSE PRACTITIONER
Payer: MEDICARE

## 2020-12-16 DIAGNOSIS — R29.898 WEAKNESS OF BOTH UPPER EXTREMITIES: ICD-10-CM

## 2020-12-16 DIAGNOSIS — R29.3 POSTURE ABNORMALITY: ICD-10-CM

## 2020-12-16 DIAGNOSIS — M54.2 PAINFUL CERVICAL RANGE OF MOTION: Primary | ICD-10-CM

## 2020-12-16 PROCEDURE — 97110 THERAPEUTIC EXERCISES: CPT | Mod: PN

## 2020-12-16 NOTE — PROCEDURES
Yamel Shah is a 70 y.o.  female patient, who presents for a 6 minute walk test ordered by MD Eliezer.  The diagnosis is Pulmonary Hypertension.  The patient's BMI is 26.6 kg/m2.  Predicted distance (lower limit of normal) is 309.75 meters.      Test Results:    The test was completed without stopping.   The total time walked was 360 seconds.  During walking, the patient reported:  Dyspnea. The patient used no assistive devices  during testing.     12/15/2020---------Distance: 420.62 meters (1380 feet)     O2 Sat % Supplemental Oxygen Heart Rate Blood Pressure Ju Scale   Pre-exercise  (Resting) 95 % Room Air 65 bpm 130/57 mmHg 3   During Exercise Unable to obtain Room Air 94 bpm 139/64 mmHg 4   Post-exercise  (Recovery) 99 % Room Air  71 bpm   mmHg       Recovery Time: 124 seconds    Performing nurse/tech: Yary WALTON      PREVIOUS STUDY:   The patient had a previous study.  10/23/2019---------Distance: 415.14 meters (1362 feet)       O2 Sat % Supplemental Oxygen Heart Rate Blood Pressure Ju Scale   Pre-exercise  (Resting) 99 % Room Air 76 bpm 133/80 mmHg 2   During Exercise 99 % Room Air 92 bpm 136/60 mmHg 3   Post-exercise  (Recovery) 99 % Room Air  71 bpm              CLINICAL INTERPRETATION:  Six minute walk distance is 420.62 meters (1380 feet) with somewhat heavy dyspnea.  Both blood pressure and heart rate remained stable with walking.  The patient did not report non-pulmonary symptoms during exercise.  Since the previous study in October 2019, exercise capacity is unchanged.  Based upon age and body mass index, exercise capacity is normal.

## 2020-12-16 NOTE — PROGRESS NOTES
"    Physical Therapy daily treatment note     Name: Yamel Shah  Clinic Number: [id number]    Therapy Diagnosis:   Encounter Diagnoses   Name Primary?    Painful cervical range of motion Yes    Weakness of both upper extremities     Posture abnormality      Physician: Viktoriya Camara NP    Visit Date: 12/16/2020    Physician Orders: PT Eval and Treat      Medical Diagnosis from Referral: M47.812 (ICD-10-CM) - Cervical spondylosis     Evaluation Date: 10/26/2020  Authorization Period Expiration: 10/02/2021  Plan of Care Expiration: 12/21/20 (8 weeks)  Reassessment Completed: 12/09/20  Visit # / Visits authorized: 11/ 20     Time In: 1205  Time Out: 1245    Total Billable Time: 40 minutes    Precautions: Hx of MRSA; Recent Endovenous laser procedure; Scleroderma with pulmonary involvement; Venous and arterial insufficiencies; chronic pain; Hx of fecal incontinence; Hx of contractures; (See medical problem list). Deformed hands/fingers.       Subjective     Yamel returns to PT and denies any pain to the neck/shoulders. She reports her hands and elbow are doing fine. She was using antibiotics for the elbow. She reports her aggravating factors are improving, feeling less and less discomfort.     She was compliant with home exercise program.  Response to previous treatment: no adverse effects  Functional change: no change     Prior Level of Function: Unlimited   Current Level of Function: unable to sit to sewe or use computer for a long time; unable to turn or bend head fwd head comfortably  DME owned/used: NA     Pain: She did not take any pain medication today (she took one last night for the leg pain)  Current 0/10, worst 6/10, best 3-4/10   Location: right neck  Description: "Pulling/tearing muscle, some sharp"  Aggravating Factors: Prolonged sitting, neck rotation B; bending head down   Easing Factors: pain medication and heating pad  Disturbed Sleep: No     Pattern of pain questions:  1.  Where is " "your pain the worst? R side neck  2.  Is your pain constant or intermittent? Constant  3.  Does bending forward make your typical pain worse? Yes  4.  Since the start of your neck pain, has there been a change in your bowel or bladder? No  5.  What can't you do now that you use to be able to do? Turn head to either sides, bending neck fwd, sitting for a long time at computer        Pts goals: "Feel better; alleviate pain".       Objective      Yamel received therapeutic exercises to develop/improve posture, lumbar/cervical ROM, strength and muscular endurance for 40  minutes including the following exercises:     Scap retractions 30x5"   Supine chin tucks 30x with 3-5 sec holds  Open book cervical rotations x15-20 B 3 sec hold   Seated marching for posture alternating for 3 minutes (TA activation and scap lightly retracted)  Supine cervical Rotations as jelly 20 x ea   Seated no monies/slouch corrections gtb 2x 15 reps for  3" modified with loops for wrists  +Standing Y x10 with 5 s/h  +Standing cervical ext IM activation hold head/ball/wall with B UE flexion to  deg x15  ==>Progress to Scaption, Scap retractions, abduction/snow angles; then progress with wrist weight 1-2# as tolerated.     Recumbent bike for 5 minutes L 2 to increase blood flow   UT stretch 3x30" B   Supine cervical retractions 2 x 15 x 3"  ( unable to perform seated)Open book (upper trunk/neck rotation SL) 20x ea  Rows otb 20x  modified with loops for wrists  Shld ext  otb 20x -modified with loops for wrists    Yamel received the following manual therapy techniques: Manual traction and Soft tissue Mobilization were applied to the: 00 for 00 minutes, including:  NP    Yamel participated in neuromuscular re-education activities to improve: Posture for 00 minutes. The following activities were included:  NP    Yamel received hot pack for 00 minutes to NP.    Yamel received cold pack for 00 minutes to NP.      Home Exercises " Provided and Patient Education Provided     Education provided:   - Pt edu on proper exercise technique.        Written Home Exercises Provided: Patient instructed to cont prior HEP.  Exercises were reviewed and Yamel was able to demonstrate them prior to the end of the session.  Yamel demonstrated good  understanding of the education provided.     See EMR under Patient Instructions for exercises provided 11/6/2020.    Assessment     Cervico-thoracic stabilization was progressed today, with extensors activation; requiring mod cues to good form to promote appropriate muscle activation. Patient reported increased muscle fatigue/mild tension with exercises, but no worse. Continue with extensors strengthening to improve posture and neck mobility.       Yamel is progressing well towards her goals.   Pt prognosis is Good.       Pt will continue to benefit from skilled outpatient physical therapy to address the deficits listed in the problem list box on initial evaluation, provide pt/family education and to maximize pt's level of independence in the home and community environment.     Pt's spiritual, cultural and educational needs considered and pt agreeable to plan of care and goals.     Anticipated Barriers for therapy: Multiple co-morbidities    Short term goals: 4 weeks    1.  Pt will demonstrate  >50%cervical AROM  W/o increasing ERP by more than 1-2 points MET 12/9/20  2. Pt will initiate home exercise program to improve strength, flexibility, endurance, and neck mobility  to return pt to PLOF (Progressing well)  3. Pt will report improved ability to lift and carry, and sustain good posture while performing ADL's and work w/o  Increasing neck pain    (Progressing well with a little bit of irritation with ability to carry groceries and laundry)     Long term goals: 8 weeks  1. Pt will demonstrate >70% of  cervical AROM without increasing  ERP by more than 1 point,  which results in WFL ROM of neck for ease  with ADLs and driving, and working/reading   (Not met, progressing slowly)  2. Pt will demonstrate reduced pain and improved functional outcomes as reported on the FOTO by reaching a limitation score of  20% or less in order to demonstrate subjective improvement in pt's condition.(Not met, progressing slowly)  3. Pt will demonstrate independence with reducing or controlling symptoms with ther ex, movement, or position independently, able to reduce pain at worst at B shoulders 3-4 points overall on pain scale using strategies taught in therapy (Progressing well)  4. Pt will demonstrate independence with the HEP at discharge. (Progressing well)  5. Pt will demonstrate B UE MMT strength at 4+/5 (MET 12/9/20; continue)      Plan   Cont to progress towards goals set by PT.  Cont to improve cervical ROM and posture next visit.          Kenton Snell, PT

## 2020-12-18 ENCOUNTER — PATIENT MESSAGE (OUTPATIENT)
Dept: ADMINISTRATIVE | Facility: OTHER | Age: 70
End: 2020-12-18

## 2020-12-22 ENCOUNTER — PATIENT MESSAGE (OUTPATIENT)
Dept: ENDOSCOPY | Facility: HOSPITAL | Age: 70
End: 2020-12-22

## 2020-12-22 ENCOUNTER — HOSPITAL ENCOUNTER (OUTPATIENT)
Dept: RADIOLOGY | Facility: HOSPITAL | Age: 70
Discharge: HOME | End: 2020-12-22
Attending: INTERNAL MEDICINE
Payer: MEDICARE

## 2020-12-22 DIAGNOSIS — Z01.818 PRE-OP TESTING: ICD-10-CM

## 2020-12-22 DIAGNOSIS — I27.20 PULMONARY HYPERTENSION: ICD-10-CM

## 2020-12-22 PROCEDURE — 71250 CT THORAX DX C-: CPT | Mod: TC

## 2020-12-22 PROCEDURE — 71250 CT CHEST WITHOUT CONTRAST: ICD-10-PCS | Mod: 26,,, | Performed by: RADIOLOGY

## 2020-12-22 PROCEDURE — 71250 CT THORAX DX C-: CPT | Mod: 26,,, | Performed by: RADIOLOGY

## 2020-12-23 ENCOUNTER — DOCUMENTATION ONLY (OUTPATIENT)
Dept: REHABILITATION | Facility: OTHER | Age: 70
End: 2020-12-23

## 2020-12-23 NOTE — PROGRESS NOTES
"    Physical Therapy daily treatment note     Name: Yamel Shah  Clinic Number: [id number]    Therapy Diagnosis:   Encounter Diagnoses   Name Primary?    Painful cervical range of motion     Weakness of both upper extremities     Posture abnormality      Physician: Viktoriya Camara NP    Visit Date: 12/24/2020    Physician Orders: PT Eval and Treat      Medical Diagnosis from Referral: M47.812 (ICD-10-CM) - Cervical spondylosis     Evaluation Date: 10/26/2020  Authorization Period Expiration: 10/02/2021  Plan of Care Expiration: 12/21/20 (8 weeks)  Reassessment Completed: 12/09/20  Visit # / Visits authorized: 12/ 20     Time In: 10:47 am  Time Out: 11:30 am    Total Billable Time: 43 minutes    Precautions: Hx of MRSA; Recent Endovenous laser procedure; Scleroderma with pulmonary involvement; Venous and arterial insufficiencies; chronic pain; Hx of fecal incontinence; Hx of contractures; (See medical problem list). Deformed hands/fingers.       Subjective     Yamel states she has been coming to therapy for her neck pain.  She notes neck is feeling okay stating "maybe 3/10" right now.  She is currently taking pain pills and states that she has arthritis and this is her usual pain.     She was compliant with home exercise program.  Response to previous treatment: no adverse effects  Functional change: no change     Prior Level of Function: Unlimited   Current Level of Function: unable to sit to sewe or use computer for a long time; unable to turn or bend head fwd head comfortably  DME owned/used: NA     Pain: She did not take any pain medication today (she took one last night for the leg pain)  Current 0/10, worst 6/10, best 3-4/10   Location: right neck  Description: "Pulling/tearing muscle, some sharp"  Aggravating Factors: Prolonged sitting, neck rotation B; bending head down   Easing Factors: pain medication and heating pad  Disturbed Sleep: No     Pattern of pain questions:  1.  Where is your " "pain the worst? R side neck  2.  Is your pain constant or intermittent? Constant  3.  Does bending forward make your typical pain worse? Yes  4.  Since the start of your neck pain, has there been a change in your bowel or bladder? No  5.  What can't you do now that you use to be able to do? Turn head to either sides, bending neck fwd, sitting for a long time at computer        Pts goals: "Feel better; alleviate pain".       Objective      Yamel received therapeutic exercises to develop/improve posture, lumbar/cervical ROM, strength and muscular endurance for 43 minutes including the following exercises:     Scap retractions 30x5"   Supine chin tucks 30x with 3-5 sec holds  Open book cervical rotations x15-20 B 3 sec hold   Seated marching for posture alternating for 3 minutes (TA activation and scap lightly retracted)  Supine cervical Rotations as jelly 20 x ea   Seated no monies/slouch corrections gtb 2x 15 reps for  3" modified with loops for wrists  Standing Y x10 with 5 s/h  +Standing cervical ext IM activation hold head/ball/wall with B UE flexion and abductio to  deg x15  ==>Progress to Scaption, Scap retractions, snow angles; then progress with wrist weight 1-2# as tolerated.     Recumbent bike for 5 minutes L 2 to increase blood flow   UT stretch 3x30" B   Supine cervical retractions 2 x 15 x 3"  ( unable to perform seated)Open book (upper trunk/neck rotation SL) 20x ea  Rows otb 20x  modified with loops for wrists  Shld ext  otb 20x -modified with loops for wrists        Home Exercises Provided and Patient Education Provided     Education provided:   - Pt edu on proper exercise technique.        Written Home Exercises Provided: Patient instructed to cont prior HEP.  Exercises were reviewed and Yamel was able to demonstrate them prior to the end of the session.  Yamel demonstrated good  understanding of the education provided.     See EMR under Patient Instructions for exercises provided " 11/6/2020.    Assessment     Patient had good tolerance to therapy today with no increase in pain or symptoms.  Core weakness evident with seated exercise as well as progressions of standing cervical retraction against wall and shoulder elevation.  Cueing to decrease trunk involvement today. DRE Montesinos is progressing well towards her goals.   Pt prognosis is Good.       Pt will continue to benefit from skilled outpatient physical therapy to address the deficits listed in the problem list box on initial evaluation, provide pt/family education and to maximize pt's level of independence in the home and community environment.     Pt's spiritual, cultural and educational needs considered and pt agreeable to plan of care and goals.     Anticipated Barriers for therapy: Multiple co-morbidities    Short term goals: 4 weeks    1.  Pt will demonstrate  >50%cervical AROM  W/o increasing ERP by more than 1-2 points MET 12/9/20  2. Pt will initiate home exercise program to improve strength, flexibility, endurance, and neck mobility  to return pt to PLOF (Progressing well)  3. Pt will report improved ability to lift and carry, and sustain good posture while performing ADL's and work w/o  Increasing neck pain    (Progressing well with a little bit of irritation with ability to carry groceries and laundry)     Long term goals: 8 weeks  1. Pt will demonstrate >70% of  cervical AROM without increasing  ERP by more than 1 point,  which results in WFL ROM of neck for ease with ADLs and driving, and working/reading   (Not met, progressing slowly)  2. Pt will demonstrate reduced pain and improved functional outcomes as reported on the FOTO by reaching a limitation score of  20% or less in order to demonstrate subjective improvement in pt's condition.(Not met, progressing slowly)  3. Pt will demonstrate independence with reducing or controlling symptoms with ther ex, movement, or position independently, able to reduce pain at worst  at B shoulders 3-4 points overall on pain scale using strategies taught in therapy (Progressing well)  4. Pt will demonstrate independence with the HEP at discharge. (Progressing well)  5. Pt will demonstrate B UE MMT strength at 4+/5 (MET 12/9/20; continue)      Plan   Cont to progress towards goals set by PT.  Cont to improve cervical ROM and posture next visit.          Franca Platt, PT

## 2020-12-24 ENCOUNTER — PATIENT MESSAGE (OUTPATIENT)
Dept: RHEUMATOLOGY | Facility: CLINIC | Age: 70
End: 2020-12-24

## 2020-12-24 ENCOUNTER — CLINICAL SUPPORT (OUTPATIENT)
Dept: REHABILITATION | Facility: OTHER | Age: 70
End: 2020-12-24
Attending: NURSE PRACTITIONER
Payer: MEDICARE

## 2020-12-24 DIAGNOSIS — M54.2 PAINFUL CERVICAL RANGE OF MOTION: ICD-10-CM

## 2020-12-24 DIAGNOSIS — R29.3 POSTURE ABNORMALITY: ICD-10-CM

## 2020-12-24 DIAGNOSIS — R29.898 WEAKNESS OF BOTH UPPER EXTREMITIES: ICD-10-CM

## 2020-12-24 PROCEDURE — 97110 THERAPEUTIC EXERCISES: CPT | Mod: PN

## 2020-12-28 ENCOUNTER — CLINICAL SUPPORT (OUTPATIENT)
Dept: REHABILITATION | Facility: OTHER | Age: 70
End: 2020-12-28
Attending: NURSE PRACTITIONER
Payer: MEDICARE

## 2020-12-28 DIAGNOSIS — R29.3 POSTURE ABNORMALITY: ICD-10-CM

## 2020-12-28 DIAGNOSIS — M54.2 PAINFUL CERVICAL RANGE OF MOTION: ICD-10-CM

## 2020-12-28 DIAGNOSIS — R29.898 WEAKNESS OF BOTH UPPER EXTREMITIES: ICD-10-CM

## 2020-12-28 PROCEDURE — 97110 THERAPEUTIC EXERCISES: CPT | Mod: PN,CQ

## 2020-12-28 NOTE — PROGRESS NOTES
"    Physical Therapy daily treatment note     Name: Yamel Shah  [facility] Number: [id number]    Therapy Diagnosis:   Encounter Diagnoses   Name Primary?    Painful cervical range of motion     Weakness of both upper extremities     Posture abnormality      Physician: Viktoriya Camara NP    Visit Date: 12/28/2020    Physician Orders: PT Eval and Treat      Medical Diagnosis from Referral: M47.812 (ICD-10-CM) - Cervical spondylosis     Evaluation Date: 10/26/2020  Authorization Period Expiration: 10/02/2021  Plan of Care Expiration: 12/21/20 (8 weeks)  Reassessment Completed: 12/09/20  Visit # / Visits authorized: 13/ 20     Time In: 1345  Time Out: 1430     Total Billable Time: 45 minutes    Precautions: Hx of MRSA; Recent Endovenous laser procedure; Scleroderma with pulmonary involvement; Venous and arterial insufficiencies; chronic pain; Hx of fecal incontinence; Hx of contractures; (See medical problem list). Deformed hands/fingers.       Subjective     Pt states feeling better w/ no c/o pn in neck.  She was compliant with home exercise program.  Response to previous treatment: no adverse effects  Functional change: no change     Prior Level of Function: Unlimited   Current Level of Function: unable to sit to sewe or use computer for a long time; unable to turn or bend head fwd head comfortably  DME owned/used: NA     Pain: She did not take any pain medication today (she took one last night for the leg pain)  Current 0/10, worst 6/10, best 3-4/10   Location: right neck  Description: "Pulling/tearing muscle, some sharp"  Aggravating Factors: Prolonged sitting, neck rotation B; bending head down   Easing Factors: pain medication and heating pad  Disturbed Sleep: No     Pattern of pain questions:  1.  Where is your pain the worst? R side neck  2.  Is your pain constant or intermittent? Constant  3.  Does bending forward make your typical pain worse? Yes  4.  Since the start of your neck pain, has there " "been a change in your bowel or bladder? No  5.  What can't you do now that you use to be able to do? Turn head to either sides, bending neck fwd, sitting for a long time at computer        Pts goals: "Feel better; alleviate pain".       Objective      Yamel received therapeutic exercises to develop/improve posture, lumbar/cervical ROM, strength and muscular endurance for 45 minutes including the following exercises:     Scap retractions 30x5"   Supine chin tucks 30x with 5 sec holds  Snow angles 20 x (VCs needed)   Open book cervical rotations x15-20 B 3 sec hold   Supine cervical Rotations as jelly 30 x ea   Seated marching for posture alternating for 3 minutes (TA activation and scap lightly retracted)  Seated no monies/slouch corrections btb 2x 15 reps for  3" modified with loops for wrists  Standing Y 20 x  with 5 s/h  +Standing cervical ext IM activation hold head/ball/wall with B UE flexion and abductio to  deg 20 x     ==>Progress to Scaption,  then progress with wrist weight 1-2# as tolerated.     Recumbent bike for 5 minutes L 2 to increase blood flow   UT stretch 3x30" B   Supine cervical retractions 2 x 15 x 3"  ( unable to perform seated)Open book (upper trunk/neck rotation SL) 20x ea  Rows otb 20x  modified with loops for wrists  Shld ext  otb 20x -modified with loops for wrists        Home Exercises Provided and Patient Education Provided     Education provided:   - Pt edu on proper exercise technique.        Written Home Exercises Provided: Patient instructed to cont prior HEP.  Exercises were reviewed and Yamel was able to demonstrate them prior to the end of the session.  Yamel demonstrated good  understanding of the education provided.     See EMR under Patient Instructions for exercises provided 11/6/2020.    Assessment   Pt showed increased cervical mobility and shld strength.  Pt cont to lack some scap control.  Pt had no adverse effects      Yamel is progressing well " towards her goals.   Pt prognosis is Good.       Pt will continue to benefit from skilled outpatient physical therapy to address the deficits listed in the problem list box on initial evaluation, provide pt/family education and to maximize pt's level of independence in the home and community environment.     Pt's spiritual, cultural and educational needs considered and pt agreeable to plan of care and goals.     Anticipated Barriers for therapy: Multiple co-morbidities    Short term goals: 4 weeks    1.  Pt will demonstrate  >50%cervical AROM  W/o increasing ERP by more than 1-2 points MET 12/9/20  2. Pt will initiate home exercise program to improve strength, flexibility, endurance, and neck mobility  to return pt to PLOF (Progressing well)  3. Pt will report improved ability to lift and carry, and sustain good posture while performing ADL's and work w/o  Increasing neck pain    (Progressing well with a little bit of irritation with ability to carry groceries and laundry)     Long term goals: 8 weeks  1. Pt will demonstrate >70% of  cervical AROM without increasing  ERP by more than 1 point,  which results in WFL ROM of neck for ease with ADLs and driving, and working/reading   (Not met, progressing slowly)  2. Pt will demonstrate reduced pain and improved functional outcomes as reported on the FOTO by reaching a limitation score of  20% or less in order to demonstrate subjective improvement in pt's condition.(Not met, progressing slowly)  3. Pt will demonstrate independence with reducing or controlling symptoms with ther ex, movement, or position independently, able to reduce pain at worst at B shoulders 3-4 points overall on pain scale using strategies taught in therapy (Progressing well)  4. Pt will demonstrate independence with the HEP at discharge. (Progressing well)  5. Pt will demonstrate B UE MMT strength at 4+/5 (MET 12/9/20; continue)      Plan   Cont to progress towards goals set by PT.  Cont to improve  cervical ROM and posture next visit.          Jerel Orona, PTA

## 2020-12-29 DIAGNOSIS — M34.9 SCLERODERMA: ICD-10-CM

## 2020-12-30 ENCOUNTER — CLINICAL SUPPORT (OUTPATIENT)
Dept: REHABILITATION | Facility: OTHER | Age: 70
End: 2020-12-30
Attending: NURSE PRACTITIONER
Payer: MEDICARE

## 2020-12-30 DIAGNOSIS — R29.898 WEAKNESS OF BOTH UPPER EXTREMITIES: ICD-10-CM

## 2020-12-30 DIAGNOSIS — R29.3 POSTURE ABNORMALITY: ICD-10-CM

## 2020-12-30 DIAGNOSIS — M54.2 PAINFUL CERVICAL RANGE OF MOTION: Primary | ICD-10-CM

## 2020-12-30 PROCEDURE — 97110 THERAPEUTIC EXERCISES: CPT | Mod: PN

## 2020-12-30 RX ORDER — MYCOPHENOLATE MOFETIL 200 MG/ML
750 POWDER, FOR SUSPENSION ORAL 2 TIMES DAILY
Qty: 480 ML | Refills: 1 | Status: SHIPPED | OUTPATIENT
Start: 2020-12-30 | End: 2021-05-25 | Stop reason: SDUPTHER

## 2020-12-30 NOTE — PROGRESS NOTES
"    Physical Therapy Recertification     Name: Yamel Shah  [facility] Number: [id number]    Therapy Diagnosis:   Encounter Diagnoses   Name Primary?    Painful cervical range of motion Yes    Weakness of both upper extremities     Posture abnormality      Physician: Viktoriya Camara NP    Visit Date: 12/30/2020    Physician Orders: PT Eval and Treat      Medical Diagnosis from Referral: M47.812 (ICD-10-CM) - Cervical spondylosis     Evaluation Date: 10/26/2020  Authorization Period Expiration: 10/02/2021  Plan of Care Expiration: 12/21/20 (8 weeks)==> Recommend Extending POC to continue 2x/week for 4 weeks (until 01/27/2021)  Reassessment Completed: 12/30/20  Visit # / Visits authorized: 14/ 20     Time In: 1330  Time Out: 1415      Total Billable Time: 45 minutes    Precautions: Hx of MRSA; Recent Endovenous laser procedure; Scleroderma with pulmonary involvement; Venous and arterial insufficiencies; chronic pain; Hx of fecal incontinence; Hx of contractures; (See medical problem list). Deformed hands/fingers.       Subjective     Patient returns to PT today and reports "my neck is feeling better, but still having pain and stiffness". She does not have pain with fwd neck flexion, nor with L rotation. There still pain R rotation and SB B. She reports feeling close to 80% of full function with her neck.     She was compliant with home exercise program.  Response to previous treatment: no adverse effects  Functional change: Improved prolonged sitting and looking down,  w/o pain.    Prior Level of Function: Unlimited   Current Level of Function: unable to sit to sewe or use computer for a long time; unable to turn or bend head fwd head comfortably  DME owned/used: NA     Pain: She did not take any pain medication today (she took one last night for the leg pain)  Current 3/10, worst 5/10, best  0/10   Location: right neck/shoulder  Description: "Pulling/tearing muscle, some sharp"  Aggravating Factors: " "Prolonged sitting, neck rotation B; bending head down   Easing Factors: pain medication and heating pad  Disturbed Sleep: No        Pts goals: "Feel better; alleviate pain".       Objective      Postural examination/scapula alignment: Rounded shoulder, Head forward and Slouched posture  Joint integrity: Hypomobile cervical spine  Skin integrity: Consistent with venous and arterial condition. Patient with compression wrap at R LE.   Edema: Mild     Cervical Range of Motion    (Bolded=Re-assessment 12/30/20)   Flexion 35 deg ERP==>60 deg less ERP   Extension 30 deg ERP==>30 deg ERP   Side bending Right 10 deg ERP==> 20 deg ERP   Side bending Left 10 deg ERP ==> 20 deg ERP   Rotation Right ERP right 50 deg==> 50 deg no pain   Rotation Left  ERP right 45 deg==> 50 deg No pain   Neck Protraction WFL==>ERP ==>(no change) mild ERP   Neck Retraction WFL==>ERP ==>(no change) mild ERP         Upper Extremity Strength  (R) UE   (L) UE     Shoulder flexion: 5/5 Shoulder flexion: 5/5   Shoulder Abduction: 5/5 Shoulder abduction: 5/5   Elbow flexion: 5/5 Elbow flexion: 5/5   Elbow extension: 5/5 Elbow extension: 5/5   Wrist flexion: +/5 Wrist flexion: 4+/5   Wrist extension: 4+/5 Wrist extension: 4+/5    4/5 : 4/5           Treatment      Yamel received therapeutic exercises to develop/improve posture, lumbar/cervical ROM, strength and muscular endurance for 45 minutes including the following exercises:   Scap retractions 30x5"   Supine chin tucks 30x with 5 sec holds  Snow angles 20 x (VCs needed)   Open book cervical rotations x20 B 3 sec hold   Seated cervical Rotations as jelly 20 x ea   Seated marching for posture alternating for 3 minutes (TA activation and scap lightly retracted)  Seated no monies/slouch corrections GTB 2x 15 reps for  3" modified with loops for wrists  Standing Y 20 x  with 5 s/h  +Standing cervical ext IM activation hold head/ball/wall with B UE flexion and abduction and W with 1# weights to " " deg x15 each       Recumbent bike for 5 minutes L 2 to increase blood flow   UT stretch 3x30" B   Supine cervical retractions 2 x 15 x 3"  ( unable to perform seated)  Open book (upper trunk/neck rotation SL) 20x ea  Rows otb 20x  modified with loops for wrists  Shld ext  otb 20x -modified with loops for wrists    Home Exercises Provided and Patient Education Provided     Education provided:   - Pt edu on proper exercise technique.        Written Home Exercises Provided: Patient instructed to cont prior HEP.  Exercises were reviewed and Yamel was able to demonstrate them prior to the end of the session.  Yamel demonstrated good  understanding of the education provided.     See EMR under Patient Instructions for exercises provided 11/6/2020.    Assessment     Patient is progressing well with pain-free neck ROM, B UE's strength and activities tolerance (improving foto score). She will require addition therapy to continue to address ROM, and cervico-thoracic muscle strengthening to improve neck mobility.       Yamel is progressing well towards her goals.   Pt prognosis is Good.       Pt will continue to benefit from skilled outpatient physical therapy to address the deficits listed in the problem list box on initial evaluation, provide pt/family education and to maximize pt's level of independence in the home and community environment.     Pt's spiritual, cultural and educational needs considered and pt agreeable to plan of care and goals.     Anticipated Barriers for therapy: Multiple co-morbidities    Long Term goals: 4 weeks      1. Pt will report improved ability to lift and carry, and sustain good posture while performing ADL's and work w/o  Increasing neck pain    (Progressing well with a little bit of irritation with ability to carry groceries and laundry)  2 Pt will demonstrate >80% of  cervical AROM without increasing  ERP by more than 1 point,  which results in WFL ROM of neck for ease with " ADLs and driving, and working/reading   (Not met, progressing slowly)  3. Pt will demonstrate reduced pain and improved functional outcomes as reported on the FOTO by reaching a limitation score of  20% or less in order to demonstrate subjective improvement in pt's condition.(Not met, progressing slowly)  4. Pt will demonstrate independence with reducing or controlling symptoms with ther ex, movement, or position independently, able to reduce pain at worst at B shoulders 2-3 points overall on pain scale using strategies taught in therapy (Progressing well)  5. Pt will demonstrate independence with the HEP at discharge. (Progressing well)  6. Pt will demonstrate B UE MMT strength at 5/5 (Progressing      Plan     ==> Recommend Extending POC to continue 2x/week for 4 weeks (until 01/27/2021)    Kenton Snell, PT

## 2020-12-30 NOTE — PLAN OF CARE
"    Physical Therapy Recertification     Name: Yamel Shah  [facility] Number: [id number]    Therapy Diagnosis:   Encounter Diagnoses   Name Primary?    Painful cervical range of motion Yes    Weakness of both upper extremities     Posture abnormality      Physician: Viktoriya Camara NP    Visit Date: 12/30/2020    Physician Orders: PT Eval and Treat      Medical Diagnosis from Referral: M47.812 (ICD-10-CM) - Cervical spondylosis     Evaluation Date: 10/26/2020  Authorization Period Expiration: 10/02/2021  Plan of Care Expiration: 12/21/20 (8 weeks)==> Recommend Extending POC to continue 2x/week for 4 weeks (until 01/27/2021)  Reassessment Completed: 12/30/20  Visit # / Visits authorized: 14/ 20     Time In: 1330  Time Out: 1415      Total Billable Time: 45 minutes    Precautions: Hx of MRSA; Recent Endovenous laser procedure; Scleroderma with pulmonary involvement; Venous and arterial insufficiencies; chronic pain; Hx of fecal incontinence; Hx of contractures; (See medical problem list). Deformed hands/fingers.       Subjective     Patient returns to PT today and reports "my neck is feeling better, but still having pain and stiffness". She does not have pain with fwd neck flexion, nor with L rotation. There still pain R rotation and SB B. She reports feeling close to 80% of full function with her neck.     She was compliant with home exercise program.  Response to previous treatment: no adverse effects  Functional change: Improved prolonged sitting and looking down,  w/o pain.    Prior Level of Function: Unlimited   Current Level of Function: unable to sit to sewe or use computer for a long time; unable to turn or bend head fwd head comfortably  DME owned/used: NA     Pain: She did not take any pain medication today (she took one last night for the leg pain)  Current 3/10, worst 5/10, best  0/10   Location: right neck/shoulder  Description: "Pulling/tearing muscle, some sharp"  Aggravating Factors: " "Prolonged sitting, neck rotation B; bending head down   Easing Factors: pain medication and heating pad  Disturbed Sleep: No        Pts goals: "Feel better; alleviate pain".       Objective      Postural examination/scapula alignment: Rounded shoulder, Head forward and Slouched posture  Joint integrity: Hypomobile cervical spine  Skin integrity: Consistent with venous and arterial condition. Patient with compression wrap at R LE.   Edema: Mild     Cervical Range of Motion    (Bolded=Re-assessment 12/30/20)   Flexion 35 deg ERP==>60 deg less ERP   Extension 30 deg ERP==>30 deg ERP   Side bending Right 10 deg ERP==> 20 deg ERP   Side bending Left 10 deg ERP ==> 20 deg ERP   Rotation Right ERP right 50 deg==> 50 deg no pain   Rotation Left  ERP right 45 deg==> 50 deg No pain   Neck Protraction WFL==>ERP ==>(no change) mild ERP   Neck Retraction WFL==>ERP ==>(no change) mild ERP         Upper Extremity Strength  (R) UE   (L) UE     Shoulder flexion: 5/5 Shoulder flexion: 5/5   Shoulder Abduction: 5/5 Shoulder abduction: 5/5   Elbow flexion: 5/5 Elbow flexion: 5/5   Elbow extension: 5/5 Elbow extension: 5/5   Wrist flexion: +/5 Wrist flexion: 4+/5   Wrist extension: 4+/5 Wrist extension: 4+/5    4/5 : 4/5           Treatment      Yamel received therapeutic exercises to develop/improve posture, lumbar/cervical ROM, strength and muscular endurance for 45 minutes including the following exercises:   Scap retractions 30x5"   Supine chin tucks 30x with 5 sec holds  Snow angles 20 x (VCs needed)   Open book cervical rotations x20 B 3 sec hold   Seated cervical Rotations as jelly 20 x ea   Seated marching for posture alternating for 3 minutes (TA activation and scap lightly retracted)  Seated no monies/slouch corrections GTB 2x 15 reps for  3" modified with loops for wrists  Standing Y 20 x  with 5 s/h  +Standing cervical ext IM activation hold head/ball/wall with B UE flexion and abduction and W with 1# weights to " " deg x15 each       Recumbent bike for 5 minutes L 2 to increase blood flow   UT stretch 3x30" B   Supine cervical retractions 2 x 15 x 3"  ( unable to perform seated)  Open book (upper trunk/neck rotation SL) 20x ea  Rows otb 20x  modified with loops for wrists  Shld ext  otb 20x -modified with loops for wrists    Home Exercises Provided and Patient Education Provided     Education provided:   - Pt edu on proper exercise technique.        Written Home Exercises Provided: Patient instructed to cont prior HEP.  Exercises were reviewed and Yamel was able to demonstrate them prior to the end of the session.  Yamel demonstrated good  understanding of the education provided.     See EMR under Patient Instructions for exercises provided 11/6/2020.    Assessment     Patient is progressing well with pain-free neck ROM, B UE's strength and activities tolerance (improving foto score). She will require addition therapy to continue to address ROM, and cervico-thoracic muscle strengthening to improve neck mobility.       Yamel is progressing well towards her goals.   Pt prognosis is Good.       Pt will continue to benefit from skilled outpatient physical therapy to address the deficits listed in the problem list box on initial evaluation, provide pt/family education and to maximize pt's level of independence in the home and community environment.     Pt's spiritual, cultural and educational needs considered and pt agreeable to plan of care and goals.     Anticipated Barriers for therapy: Multiple co-morbidities    Long Term goals: 4 weeks  (01/27/21)    1. Pt will report improved ability to lift and carry, and sustain good posture while performing ADL's and work w/o  Increasing neck pain    (Progressing well with a little bit of irritation with ability to carry groceries and laundry)  2 Pt will demonstrate >80% of  cervical AROM without increasing  ERP by more than 1 point,  which results in WFL ROM of neck for " ease with ADLs and driving, and working/reading   (Not met, progressing slowly)  3. Pt will demonstrate reduced pain and improved functional outcomes as reported on the FOTO by reaching a limitation score of  20% or less in order to demonstrate subjective improvement in pt's condition.(Not met, progressing slowly)  4. Pt will demonstrate independence with reducing or controlling symptoms with ther ex, movement, or position independently, able to reduce pain at worst at B shoulders 2-3 points overall on pain scale using strategies taught in therapy (Progressing well)  5. Pt will demonstrate independence with the HEP at discharge. (Progressing well)  6. Pt will demonstrate B UE MMT strength at 5/5 (Progressing      Plan     ==> Recommend Extending POC to continue 2x/week for 4 weeks (until 01/27/2021)    Kenton Snell, PT

## 2021-01-03 ENCOUNTER — LAB VISIT (OUTPATIENT)
Dept: URGENT CARE | Facility: CLINIC | Age: 71
End: 2021-01-03
Payer: MEDICARE

## 2021-01-03 VITALS — TEMPERATURE: 99 F

## 2021-01-03 DIAGNOSIS — Z01.818 PREOP TESTING: ICD-10-CM

## 2021-01-03 LAB — SARS-COV-2 RNA RESP QL NAA+PROBE: NOT DETECTED

## 2021-01-03 PROCEDURE — U0003 INFECTIOUS AGENT DETECTION BY NUCLEIC ACID (DNA OR RNA); SEVERE ACUTE RESPIRATORY SYNDROME CORONAVIRUS 2 (SARS-COV-2) (CORONAVIRUS DISEASE [COVID-19]), AMPLIFIED PROBE TECHNIQUE, MAKING USE OF HIGH THROUGHPUT TECHNOLOGIES AS DESCRIBED BY CMS-2020-01-R: HCPCS

## 2021-01-05 ENCOUNTER — PATIENT MESSAGE (OUTPATIENT)
Dept: INTERNAL MEDICINE | Facility: CLINIC | Age: 71
End: 2021-01-05

## 2021-01-05 ENCOUNTER — DOCUMENTATION ONLY (OUTPATIENT)
Dept: REHABILITATION | Facility: OTHER | Age: 71
End: 2021-01-05

## 2021-01-05 ENCOUNTER — PATIENT OUTREACH (OUTPATIENT)
Dept: ADMINISTRATIVE | Facility: OTHER | Age: 71
End: 2021-01-05

## 2021-01-05 ENCOUNTER — HOSPITAL ENCOUNTER (OUTPATIENT)
Facility: HOSPITAL | Age: 71
Discharge: HOME | End: 2021-01-05
Attending: INTERNAL MEDICINE | Admitting: INTERNAL MEDICINE
Payer: MEDICARE

## 2021-01-05 VITALS
SYSTOLIC BLOOD PRESSURE: 136 MMHG | HEIGHT: 65 IN | WEIGHT: 159.88 LBS | TEMPERATURE: 97 F | HEART RATE: 75 BPM | BODY MASS INDEX: 26.64 KG/M2 | OXYGEN SATURATION: 100 % | RESPIRATION RATE: 18 BRPM | DIASTOLIC BLOOD PRESSURE: 65 MMHG

## 2021-01-05 DIAGNOSIS — Z12.31 BREAST CANCER SCREENING BY MAMMOGRAM: Primary | ICD-10-CM

## 2021-01-05 DIAGNOSIS — I27.20 PULMONARY HYPERTENSION: ICD-10-CM

## 2021-01-05 PROCEDURE — G0378 HOSPITAL OBSERVATION PER HR: HCPCS

## 2021-01-05 PROCEDURE — 99499 NO LOS: ICD-10-PCS | Mod: ,,, | Performed by: INTERNAL MEDICINE

## 2021-01-05 PROCEDURE — C1751 CATH, INF, PER/CENT/MIDLINE: HCPCS | Performed by: INTERNAL MEDICINE

## 2021-01-05 PROCEDURE — 25000003 PHARM REV CODE 250: Performed by: INTERNAL MEDICINE

## 2021-01-05 PROCEDURE — 93451 RIGHT HEART CATH: CPT | Performed by: INTERNAL MEDICINE

## 2021-01-05 PROCEDURE — C1894 INTRO/SHEATH, NON-LASER: HCPCS | Performed by: INTERNAL MEDICINE

## 2021-01-05 PROCEDURE — 93451 PR RIGHT HEART CATH O2 SATURATION & CARDIAC OUTPUT: ICD-10-PCS | Mod: 26,,, | Performed by: INTERNAL MEDICINE

## 2021-01-05 PROCEDURE — 93451 RIGHT HEART CATH: CPT | Mod: 26,,, | Performed by: INTERNAL MEDICINE

## 2021-01-05 PROCEDURE — 99499 UNLISTED E&M SERVICE: CPT | Mod: ,,, | Performed by: INTERNAL MEDICINE

## 2021-01-05 RX ORDER — LIDOCAINE HYDROCHLORIDE 20 MG/ML
INJECTION, SOLUTION INFILTRATION; PERINEURAL
Status: DISCONTINUED | OUTPATIENT
Start: 2021-01-05 | End: 2021-01-05 | Stop reason: HOSPADM

## 2021-01-07 ENCOUNTER — TELEPHONE (OUTPATIENT)
Dept: PAIN MEDICINE | Facility: CLINIC | Age: 71
End: 2021-01-07

## 2021-01-08 ENCOUNTER — OFFICE VISIT (OUTPATIENT)
Dept: PAIN MEDICINE | Facility: CLINIC | Age: 71
End: 2021-01-08
Payer: MEDICARE

## 2021-01-08 VITALS
TEMPERATURE: 98 F | BODY MASS INDEX: 26.66 KG/M2 | DIASTOLIC BLOOD PRESSURE: 59 MMHG | WEIGHT: 160 LBS | RESPIRATION RATE: 18 BRPM | HEART RATE: 60 BPM | SYSTOLIC BLOOD PRESSURE: 129 MMHG | HEIGHT: 65 IN

## 2021-01-08 DIAGNOSIS — M50.30 DDD (DEGENERATIVE DISC DISEASE), CERVICAL: ICD-10-CM

## 2021-01-08 DIAGNOSIS — G60.9 IDIOPATHIC NEUROPATHY: ICD-10-CM

## 2021-01-08 DIAGNOSIS — G89.4 CHRONIC PAIN DISORDER: Primary | ICD-10-CM

## 2021-01-08 DIAGNOSIS — M79.18 MYOFASCIAL PAIN: ICD-10-CM

## 2021-01-08 DIAGNOSIS — M47.812 CERVICAL SPONDYLOSIS: ICD-10-CM

## 2021-01-08 DIAGNOSIS — M34.89 CUTANEOUS SCLERODERMA: ICD-10-CM

## 2021-01-08 PROCEDURE — 99213 OFFICE O/P EST LOW 20 MIN: CPT | Mod: S$PBB,,, | Performed by: NURSE PRACTITIONER

## 2021-01-08 PROCEDURE — 99213 PR OFFICE/OUTPT VISIT, EST, LEVL III, 20-29 MIN: ICD-10-PCS | Mod: S$PBB,,, | Performed by: NURSE PRACTITIONER

## 2021-01-08 PROCEDURE — 99999 PR PBB SHADOW E&M-EST. PATIENT-LVL IV: ICD-10-PCS | Mod: PBBFAC,,, | Performed by: NURSE PRACTITIONER

## 2021-01-08 PROCEDURE — 99999 PR PBB SHADOW E&M-EST. PATIENT-LVL IV: CPT | Mod: PBBFAC,,, | Performed by: NURSE PRACTITIONER

## 2021-01-08 PROCEDURE — 99214 OFFICE O/P EST MOD 30 MIN: CPT | Mod: PBBFAC | Performed by: NURSE PRACTITIONER

## 2021-01-08 RX ORDER — TIZANIDINE 4 MG/1
4 TABLET ORAL NIGHTLY PRN
Qty: 30 TABLET | Refills: 3 | Status: SHIPPED | OUTPATIENT
Start: 2021-01-08 | End: 2021-05-05 | Stop reason: SDUPTHER

## 2021-01-11 ENCOUNTER — SPECIALTY PHARMACY (OUTPATIENT)
Dept: PHARMACY | Facility: CLINIC | Age: 71
End: 2021-01-11

## 2021-01-13 ENCOUNTER — CLINICAL SUPPORT (OUTPATIENT)
Dept: REHABILITATION | Facility: OTHER | Age: 71
End: 2021-01-13
Attending: NURSE PRACTITIONER
Payer: MEDICARE

## 2021-01-13 DIAGNOSIS — R29.898 WEAKNESS OF BOTH UPPER EXTREMITIES: ICD-10-CM

## 2021-01-13 DIAGNOSIS — M54.2 PAINFUL CERVICAL RANGE OF MOTION: Primary | ICD-10-CM

## 2021-01-13 DIAGNOSIS — R29.3 POSTURE ABNORMALITY: ICD-10-CM

## 2021-01-13 PROCEDURE — 97110 THERAPEUTIC EXERCISES: CPT | Mod: PN

## 2021-01-20 ENCOUNTER — OFFICE VISIT (OUTPATIENT)
Dept: INTERNAL MEDICINE | Facility: CLINIC | Age: 71
End: 2021-01-20
Payer: MEDICARE

## 2021-01-20 ENCOUNTER — OFFICE VISIT (OUTPATIENT)
Dept: WOUND CARE | Facility: CLINIC | Age: 71
End: 2021-01-20
Payer: MEDICARE

## 2021-01-20 ENCOUNTER — OFFICE VISIT (OUTPATIENT)
Dept: GASTROENTEROLOGY | Facility: CLINIC | Age: 71
End: 2021-01-20
Payer: MEDICARE

## 2021-01-20 ENCOUNTER — TELEPHONE (OUTPATIENT)
Dept: INTERNAL MEDICINE | Facility: CLINIC | Age: 71
End: 2021-01-20

## 2021-01-20 VITALS
WEIGHT: 163.56 LBS | DIASTOLIC BLOOD PRESSURE: 63 MMHG | TEMPERATURE: 97 F | HEART RATE: 67 BPM | HEIGHT: 65 IN | BODY MASS INDEX: 27.25 KG/M2 | SYSTOLIC BLOOD PRESSURE: 142 MMHG

## 2021-01-20 VITALS
BODY MASS INDEX: 27.44 KG/M2 | HEIGHT: 65 IN | SYSTOLIC BLOOD PRESSURE: 130 MMHG | HEART RATE: 77 BPM | DIASTOLIC BLOOD PRESSURE: 58 MMHG | WEIGHT: 164.69 LBS

## 2021-01-20 DIAGNOSIS — L97.529 SKIN ULCERS OF BOTH FEET: Primary | ICD-10-CM

## 2021-01-20 DIAGNOSIS — K21.9 GASTROESOPHAGEAL REFLUX DISEASE, UNSPECIFIED WHETHER ESOPHAGITIS PRESENT: Primary | ICD-10-CM

## 2021-01-20 DIAGNOSIS — R13.10 DYSPHAGIA, UNSPECIFIED TYPE: ICD-10-CM

## 2021-01-20 DIAGNOSIS — L97.519 SKIN ULCERS OF BOTH FEET: Primary | ICD-10-CM

## 2021-01-20 DIAGNOSIS — D50.9 IRON DEFICIENCY ANEMIA, UNSPECIFIED IRON DEFICIENCY ANEMIA TYPE: ICD-10-CM

## 2021-01-20 DIAGNOSIS — R13.19 ESOPHAGEAL DYSPHAGIA: ICD-10-CM

## 2021-01-20 PROCEDURE — 99999 PR PBB SHADOW E&M-EST. PATIENT-LVL IV: CPT | Mod: PBBFAC,,, | Performed by: STUDENT IN AN ORGANIZED HEALTH CARE EDUCATION/TRAINING PROGRAM

## 2021-01-20 PROCEDURE — 99215 OFFICE O/P EST HI 40 MIN: CPT | Mod: PBBFAC,27 | Performed by: NURSE PRACTITIONER

## 2021-01-20 PROCEDURE — 99213 OFFICE O/P EST LOW 20 MIN: CPT | Mod: S$PBB,,, | Performed by: NURSE PRACTITIONER

## 2021-01-20 PROCEDURE — 99999 PR PBB SHADOW E&M-EST. PATIENT-LVL V: ICD-10-PCS | Mod: PBBFAC,,, | Performed by: NURSE PRACTITIONER

## 2021-01-20 PROCEDURE — 99442 PR PHYSICIAN TELEPHONE EVALUATION 11-20 MIN: CPT | Mod: 95,,, | Performed by: NURSE PRACTITIONER

## 2021-01-20 PROCEDURE — 99214 OFFICE O/P EST MOD 30 MIN: CPT | Mod: PBBFAC | Performed by: STUDENT IN AN ORGANIZED HEALTH CARE EDUCATION/TRAINING PROGRAM

## 2021-01-20 PROCEDURE — 99214 PR OFFICE/OUTPT VISIT, EST, LEVL IV, 30-39 MIN: ICD-10-PCS | Mod: S$PBB,,, | Performed by: STUDENT IN AN ORGANIZED HEALTH CARE EDUCATION/TRAINING PROGRAM

## 2021-01-20 PROCEDURE — 99442 PR PHYSICIAN TELEPHONE EVALUATION 11-20 MIN: ICD-10-PCS | Mod: 95,,, | Performed by: NURSE PRACTITIONER

## 2021-01-20 PROCEDURE — 99999 PR PBB SHADOW E&M-EST. PATIENT-LVL V: CPT | Mod: PBBFAC,,, | Performed by: NURSE PRACTITIONER

## 2021-01-20 PROCEDURE — 99214 OFFICE O/P EST MOD 30 MIN: CPT | Mod: S$PBB,,, | Performed by: STUDENT IN AN ORGANIZED HEALTH CARE EDUCATION/TRAINING PROGRAM

## 2021-01-20 PROCEDURE — 99999 PR PBB SHADOW E&M-EST. PATIENT-LVL IV: ICD-10-PCS | Mod: PBBFAC,,, | Performed by: STUDENT IN AN ORGANIZED HEALTH CARE EDUCATION/TRAINING PROGRAM

## 2021-01-20 PROCEDURE — 99213 PR OFFICE/OUTPT VISIT, EST, LEVL III, 20-29 MIN: ICD-10-PCS | Mod: S$PBB,,, | Performed by: NURSE PRACTITIONER

## 2021-01-20 RX ORDER — BENZONATATE 200 MG/1
200 CAPSULE ORAL 3 TIMES DAILY PRN
Qty: 30 CAPSULE | Refills: 0 | Status: SHIPPED | OUTPATIENT
Start: 2021-01-20 | End: 2021-01-20 | Stop reason: SDUPTHER

## 2021-01-20 RX ORDER — BENZONATATE 200 MG/1
200 CAPSULE ORAL 3 TIMES DAILY PRN
Qty: 30 CAPSULE | Refills: 0 | Status: SHIPPED | OUTPATIENT
Start: 2021-01-20 | End: 2021-01-30

## 2021-01-21 ENCOUNTER — CLINICAL SUPPORT (OUTPATIENT)
Dept: REHABILITATION | Facility: OTHER | Age: 71
End: 2021-01-21
Attending: NURSE PRACTITIONER
Payer: MEDICARE

## 2021-01-21 DIAGNOSIS — R29.898 WEAKNESS OF BOTH UPPER EXTREMITIES: ICD-10-CM

## 2021-01-21 DIAGNOSIS — R29.3 POSTURE ABNORMALITY: ICD-10-CM

## 2021-01-21 DIAGNOSIS — M54.2 PAINFUL CERVICAL RANGE OF MOTION: ICD-10-CM

## 2021-01-21 PROCEDURE — 97110 THERAPEUTIC EXERCISES: CPT | Mod: PN

## 2021-01-26 ENCOUNTER — CLINICAL SUPPORT (OUTPATIENT)
Dept: REHABILITATION | Facility: OTHER | Age: 71
End: 2021-01-26
Attending: NURSE PRACTITIONER
Payer: MEDICARE

## 2021-01-26 DIAGNOSIS — M54.2 PAINFUL CERVICAL RANGE OF MOTION: ICD-10-CM

## 2021-01-26 DIAGNOSIS — R29.3 POSTURE ABNORMALITY: ICD-10-CM

## 2021-01-26 DIAGNOSIS — R29.898 WEAKNESS OF BOTH UPPER EXTREMITIES: ICD-10-CM

## 2021-01-26 PROCEDURE — 97110 THERAPEUTIC EXERCISES: CPT | Mod: PN,CQ

## 2021-01-27 ENCOUNTER — HOSPITAL ENCOUNTER (OUTPATIENT)
Dept: RADIOLOGY | Facility: HOSPITAL | Age: 71
Discharge: HOME | End: 2021-01-27
Attending: INTERNAL MEDICINE
Payer: MEDICARE

## 2021-01-27 DIAGNOSIS — Z12.31 BREAST CANCER SCREENING BY MAMMOGRAM: ICD-10-CM

## 2021-01-27 PROCEDURE — 77063 MAMMO DIGITAL SCREENING BILAT WITH TOMO: ICD-10-PCS | Mod: 26,,, | Performed by: RADIOLOGY

## 2021-01-27 PROCEDURE — 77063 BREAST TOMOSYNTHESIS BI: CPT | Mod: 26,,, | Performed by: RADIOLOGY

## 2021-01-27 PROCEDURE — 77067 MAMMO DIGITAL SCREENING BILAT WITH TOMO: ICD-10-PCS | Mod: 26,,, | Performed by: RADIOLOGY

## 2021-01-27 PROCEDURE — 77067 SCR MAMMO BI INCL CAD: CPT | Mod: 26,,, | Performed by: RADIOLOGY

## 2021-01-27 PROCEDURE — 77067 SCR MAMMO BI INCL CAD: CPT | Mod: TC

## 2021-01-28 ENCOUNTER — CLINICAL SUPPORT (OUTPATIENT)
Dept: REHABILITATION | Facility: OTHER | Age: 71
End: 2021-01-28
Attending: NURSE PRACTITIONER
Payer: MEDICARE

## 2021-01-28 DIAGNOSIS — R29.898 WEAKNESS OF BOTH UPPER EXTREMITIES: ICD-10-CM

## 2021-01-28 DIAGNOSIS — M54.2 PAINFUL CERVICAL RANGE OF MOTION: ICD-10-CM

## 2021-01-28 DIAGNOSIS — R29.3 POSTURE ABNORMALITY: ICD-10-CM

## 2021-01-28 PROCEDURE — 97110 THERAPEUTIC EXERCISES: CPT | Mod: PN

## 2021-01-30 ENCOUNTER — LAB VISIT (OUTPATIENT)
Dept: INTERNAL MEDICINE | Facility: CLINIC | Age: 71
End: 2021-01-30
Payer: MEDICARE

## 2021-01-30 DIAGNOSIS — Z01.818 PRE-OP TESTING: ICD-10-CM

## 2021-01-30 PROCEDURE — U0003 INFECTIOUS AGENT DETECTION BY NUCLEIC ACID (DNA OR RNA); SEVERE ACUTE RESPIRATORY SYNDROME CORONAVIRUS 2 (SARS-COV-2) (CORONAVIRUS DISEASE [COVID-19]), AMPLIFIED PROBE TECHNIQUE, MAKING USE OF HIGH THROUGHPUT TECHNOLOGIES AS DESCRIBED BY CMS-2020-01-R: HCPCS

## 2021-01-31 LAB — SARS-COV-2 RNA RESP QL NAA+PROBE: NOT DETECTED

## 2021-02-02 ENCOUNTER — ANESTHESIA EVENT (OUTPATIENT)
Dept: ENDOSCOPY | Facility: HOSPITAL | Age: 71
End: 2021-02-02
Payer: MEDICARE

## 2021-02-02 ENCOUNTER — HOSPITAL ENCOUNTER (OUTPATIENT)
Facility: HOSPITAL | Age: 71
Discharge: HOME | End: 2021-02-02
Attending: INTERNAL MEDICINE | Admitting: INTERNAL MEDICINE
Payer: MEDICARE

## 2021-02-02 ENCOUNTER — TELEPHONE (OUTPATIENT)
Dept: GASTROENTEROLOGY | Facility: CLINIC | Age: 71
End: 2021-02-02

## 2021-02-02 ENCOUNTER — PATIENT MESSAGE (OUTPATIENT)
Dept: GASTROENTEROLOGY | Facility: CLINIC | Age: 71
End: 2021-02-02

## 2021-02-02 ENCOUNTER — ANESTHESIA (OUTPATIENT)
Dept: ENDOSCOPY | Facility: HOSPITAL | Age: 71
End: 2021-02-02
Payer: MEDICARE

## 2021-02-02 VITALS
DIASTOLIC BLOOD PRESSURE: 62 MMHG | WEIGHT: 158 LBS | HEART RATE: 61 BPM | SYSTOLIC BLOOD PRESSURE: 122 MMHG | TEMPERATURE: 98 F | HEIGHT: 65 IN | BODY MASS INDEX: 26.33 KG/M2 | OXYGEN SATURATION: 96 % | RESPIRATION RATE: 21 BRPM

## 2021-02-02 DIAGNOSIS — R13.10 DYSPHAGIA, UNSPECIFIED TYPE: Primary | ICD-10-CM

## 2021-02-02 DIAGNOSIS — D50.9 IDA (IRON DEFICIENCY ANEMIA): ICD-10-CM

## 2021-02-02 PROCEDURE — 43239 EGD BIOPSY SINGLE/MULTIPLE: CPT | Performed by: INTERNAL MEDICINE

## 2021-02-02 PROCEDURE — D9220A PRA ANESTHESIA: ICD-10-PCS | Mod: CRNA,,, | Performed by: NURSE ANESTHETIST, CERTIFIED REGISTERED

## 2021-02-02 PROCEDURE — 82657 ENZYME CELL ACTIVITY: CPT | Performed by: PATHOLOGY

## 2021-02-02 PROCEDURE — 88305 TISSUE EXAM BY PATHOLOGIST: CPT | Mod: 26,,, | Performed by: PATHOLOGY

## 2021-02-02 PROCEDURE — D9220A PRA ANESTHESIA: ICD-10-PCS | Mod: ANES,,, | Performed by: ANESTHESIOLOGY

## 2021-02-02 PROCEDURE — 88305 TISSUE EXAM BY PATHOLOGIST: CPT | Mod: 59 | Performed by: PATHOLOGY

## 2021-02-02 PROCEDURE — 88342 CHG IMMUNOCYTOCHEMISTRY: ICD-10-PCS | Mod: 26,,, | Performed by: PATHOLOGY

## 2021-02-02 PROCEDURE — 88305 TISSUE EXAM BY PATHOLOGIST: ICD-10-PCS | Mod: 26,,, | Performed by: PATHOLOGY

## 2021-02-02 PROCEDURE — 25000003 PHARM REV CODE 250: Performed by: INTERNAL MEDICINE

## 2021-02-02 PROCEDURE — 27201012 HC FORCEPS, HOT/COLD, DISP: Performed by: INTERNAL MEDICINE

## 2021-02-02 PROCEDURE — D9220A PRA ANESTHESIA: Mod: ANES,,, | Performed by: ANESTHESIOLOGY

## 2021-02-02 PROCEDURE — D9220A PRA ANESTHESIA: Mod: CRNA,,, | Performed by: NURSE ANESTHETIST, CERTIFIED REGISTERED

## 2021-02-02 PROCEDURE — 43239 PR EGD, FLEX, W/BIOPSY, SGL/MULTI: ICD-10-PCS | Mod: ,,, | Performed by: INTERNAL MEDICINE

## 2021-02-02 PROCEDURE — 88342 IMHCHEM/IMCYTCHM 1ST ANTB: CPT | Mod: 26,,, | Performed by: PATHOLOGY

## 2021-02-02 PROCEDURE — 63600175 PHARM REV CODE 636 W HCPCS: Performed by: NURSE ANESTHETIST, CERTIFIED REGISTERED

## 2021-02-02 PROCEDURE — 88342 IMHCHEM/IMCYTCHM 1ST ANTB: CPT | Performed by: PATHOLOGY

## 2021-02-02 PROCEDURE — 37000008 HC ANESTHESIA 1ST 15 MINUTES: Performed by: INTERNAL MEDICINE

## 2021-02-02 PROCEDURE — 43239 EGD BIOPSY SINGLE/MULTIPLE: CPT | Mod: ,,, | Performed by: INTERNAL MEDICINE

## 2021-02-02 PROCEDURE — 37000009 HC ANESTHESIA EA ADD 15 MINS: Performed by: INTERNAL MEDICINE

## 2021-02-02 RX ORDER — NIFEDIPINE 90 MG/1
TABLET, FILM COATED, EXTENDED RELEASE ORAL
Qty: 90 TABLET | Refills: 3 | Status: SHIPPED | OUTPATIENT
Start: 2021-02-02 | End: 2021-11-12 | Stop reason: SDUPTHER

## 2021-02-02 RX ORDER — PROPOFOL 10 MG/ML
VIAL (ML) INTRAVENOUS
Status: DISCONTINUED | OUTPATIENT
Start: 2021-02-02 | End: 2021-02-02

## 2021-02-02 RX ORDER — LIDOCAINE HCL/PF 100 MG/5ML
SYRINGE (ML) INTRAVENOUS
Status: DISCONTINUED | OUTPATIENT
Start: 2021-02-02 | End: 2021-02-02

## 2021-02-02 RX ORDER — SODIUM CHLORIDE 9 MG/ML
INJECTION, SOLUTION INTRAVENOUS CONTINUOUS
Status: DISCONTINUED | OUTPATIENT
Start: 2021-02-02 | End: 2021-02-02 | Stop reason: HOSPADM

## 2021-02-02 RX ORDER — SODIUM CHLORIDE 0.9 % (FLUSH) 0.9 %
10 SYRINGE (ML) INJECTION
Status: DISCONTINUED | OUTPATIENT
Start: 2021-02-02 | End: 2021-02-02 | Stop reason: HOSPADM

## 2021-02-02 RX ORDER — PROPOFOL 10 MG/ML
VIAL (ML) INTRAVENOUS CONTINUOUS PRN
Status: DISCONTINUED | OUTPATIENT
Start: 2021-02-02 | End: 2021-02-02

## 2021-02-02 RX ADMIN — PROPOFOL 200 MCG/KG/MIN: 10 INJECTION, EMULSION INTRAVENOUS at 11:02

## 2021-02-02 RX ADMIN — PROPOFOL 80 MG: 10 INJECTION, EMULSION INTRAVENOUS at 11:02

## 2021-02-02 RX ADMIN — Medication 100 MG: at 11:02

## 2021-02-02 RX ADMIN — SODIUM CHLORIDE: 0.9 INJECTION, SOLUTION INTRAVENOUS at 11:02

## 2021-02-02 RX ADMIN — SODIUM CHLORIDE: 0.9 INJECTION, SOLUTION INTRAVENOUS at 10:02

## 2021-02-04 ENCOUNTER — PATIENT MESSAGE (OUTPATIENT)
Dept: RHEUMATOLOGY | Facility: CLINIC | Age: 71
End: 2021-02-04

## 2021-02-04 ENCOUNTER — PATIENT MESSAGE (OUTPATIENT)
Dept: INTERNAL MEDICINE | Facility: CLINIC | Age: 71
End: 2021-02-04

## 2021-02-05 ENCOUNTER — PATIENT MESSAGE (OUTPATIENT)
Dept: RHEUMATOLOGY | Facility: CLINIC | Age: 71
End: 2021-02-05

## 2021-02-05 ENCOUNTER — TELEPHONE (OUTPATIENT)
Dept: PAIN MEDICINE | Facility: CLINIC | Age: 71
End: 2021-02-05

## 2021-02-05 DIAGNOSIS — E78.5 HYPERLIPIDEMIA: ICD-10-CM

## 2021-02-05 DIAGNOSIS — M34.9 SCLERODERMA: ICD-10-CM

## 2021-02-05 RX ORDER — PRAVASTATIN SODIUM 20 MG/1
20 TABLET ORAL NIGHTLY
Qty: 90 TABLET | Refills: 1 | Status: SHIPPED | OUTPATIENT
Start: 2021-02-05 | End: 2021-09-17 | Stop reason: SDUPTHER

## 2021-02-08 ENCOUNTER — OFFICE VISIT (OUTPATIENT)
Dept: PAIN MEDICINE | Facility: CLINIC | Age: 71
End: 2021-02-08
Payer: MEDICARE

## 2021-02-08 VITALS
BODY MASS INDEX: 26.96 KG/M2 | DIASTOLIC BLOOD PRESSURE: 65 MMHG | SYSTOLIC BLOOD PRESSURE: 137 MMHG | TEMPERATURE: 98 F | HEART RATE: 64 BPM | WEIGHT: 161.81 LBS | HEIGHT: 65 IN

## 2021-02-08 DIAGNOSIS — G89.4 CHRONIC PAIN DISORDER: Primary | ICD-10-CM

## 2021-02-08 DIAGNOSIS — M50.30 DDD (DEGENERATIVE DISC DISEASE), CERVICAL: ICD-10-CM

## 2021-02-08 DIAGNOSIS — M47.812 CERVICAL SPONDYLOSIS: ICD-10-CM

## 2021-02-08 DIAGNOSIS — G60.9 IDIOPATHIC NEUROPATHY: ICD-10-CM

## 2021-02-08 DIAGNOSIS — M34.89 CUTANEOUS SCLERODERMA: ICD-10-CM

## 2021-02-08 DIAGNOSIS — M79.18 MYOFASCIAL PAIN: ICD-10-CM

## 2021-02-08 LAB
FINAL PATHOLOGIC DIAGNOSIS: NORMAL
FINAL PATHOLOGIC DIAGNOSIS: NORMAL
GROSS: NORMAL
GROSS: NORMAL
Lab: NORMAL
Lab: NORMAL
MICROSCOPIC EXAM: NORMAL

## 2021-02-08 PROCEDURE — 99999 PR PBB SHADOW E&M-EST. PATIENT-LVL IV: CPT | Mod: PBBFAC,,, | Performed by: NURSE PRACTITIONER

## 2021-02-08 PROCEDURE — 99213 OFFICE O/P EST LOW 20 MIN: CPT | Mod: S$PBB,,, | Performed by: NURSE PRACTITIONER

## 2021-02-08 PROCEDURE — 99214 OFFICE O/P EST MOD 30 MIN: CPT | Mod: PBBFAC | Performed by: NURSE PRACTITIONER

## 2021-02-08 PROCEDURE — 99999 PR PBB SHADOW E&M-EST. PATIENT-LVL IV: ICD-10-PCS | Mod: PBBFAC,,, | Performed by: NURSE PRACTITIONER

## 2021-02-08 PROCEDURE — 99213 PR OFFICE/OUTPT VISIT, EST, LEVL III, 20-29 MIN: ICD-10-PCS | Mod: S$PBB,,, | Performed by: NURSE PRACTITIONER

## 2021-02-17 ENCOUNTER — PATIENT MESSAGE (OUTPATIENT)
Dept: RHEUMATOLOGY | Facility: CLINIC | Age: 71
End: 2021-02-17

## 2021-02-17 ENCOUNTER — TELEPHONE (OUTPATIENT)
Dept: PULMONOLOGY | Facility: CLINIC | Age: 71
End: 2021-02-17

## 2021-02-22 ENCOUNTER — CLINICAL SUPPORT (OUTPATIENT)
Dept: REHABILITATION | Facility: HOSPITAL | Age: 71
End: 2021-02-22
Payer: MEDICARE

## 2021-02-22 ENCOUNTER — SPECIALTY PHARMACY (OUTPATIENT)
Dept: PHARMACY | Facility: CLINIC | Age: 71
End: 2021-02-22

## 2021-02-22 DIAGNOSIS — M25.60 STIFFNESS IN JOINT: ICD-10-CM

## 2021-02-22 PROCEDURE — 97760 ORTHOTIC MGMT&TRAING 1ST ENC: CPT

## 2021-02-23 ENCOUNTER — PATIENT MESSAGE (OUTPATIENT)
Dept: RHEUMATOLOGY | Facility: CLINIC | Age: 71
End: 2021-02-23

## 2021-02-26 ENCOUNTER — PATIENT MESSAGE (OUTPATIENT)
Dept: GASTROENTEROLOGY | Facility: CLINIC | Age: 71
End: 2021-02-26

## 2021-02-26 ENCOUNTER — TELEPHONE (OUTPATIENT)
Dept: GASTROENTEROLOGY | Facility: CLINIC | Age: 71
End: 2021-02-26

## 2021-03-02 ENCOUNTER — OFFICE VISIT (OUTPATIENT)
Dept: GASTROENTEROLOGY | Facility: CLINIC | Age: 71
End: 2021-03-02
Payer: COMMERCIAL

## 2021-03-02 ENCOUNTER — TELEPHONE (OUTPATIENT)
Dept: ENDOSCOPY | Facility: HOSPITAL | Age: 71
End: 2021-03-02

## 2021-03-02 ENCOUNTER — TELEPHONE (OUTPATIENT)
Dept: GASTROENTEROLOGY | Facility: CLINIC | Age: 71
End: 2021-03-02

## 2021-03-02 VITALS
HEIGHT: 65 IN | DIASTOLIC BLOOD PRESSURE: 57 MMHG | WEIGHT: 159.69 LBS | OXYGEN SATURATION: 100 % | HEART RATE: 87 BPM | BODY MASS INDEX: 26.6 KG/M2 | SYSTOLIC BLOOD PRESSURE: 111 MMHG

## 2021-03-02 DIAGNOSIS — Z01.818 PRE-OP TESTING: ICD-10-CM

## 2021-03-02 DIAGNOSIS — K21.9 GASTROESOPHAGEAL REFLUX DISEASE, UNSPECIFIED WHETHER ESOPHAGITIS PRESENT: ICD-10-CM

## 2021-03-02 DIAGNOSIS — R68.81 EARLY SATIETY: ICD-10-CM

## 2021-03-02 DIAGNOSIS — D50.9 IRON DEFICIENCY ANEMIA, UNSPECIFIED IRON DEFICIENCY ANEMIA TYPE: Primary | ICD-10-CM

## 2021-03-02 DIAGNOSIS — R14.0 BLOATING: ICD-10-CM

## 2021-03-02 DIAGNOSIS — R13.10 DYSPHAGIA, UNSPECIFIED TYPE: ICD-10-CM

## 2021-03-02 DIAGNOSIS — R15.9 INCONTINENCE OF FECES, UNSPECIFIED FECAL INCONTINENCE TYPE: ICD-10-CM

## 2021-03-02 DIAGNOSIS — R10.9 ABDOMINAL PAIN, UNSPECIFIED ABDOMINAL LOCATION: ICD-10-CM

## 2021-03-02 PROCEDURE — 99999 PR PBB SHADOW E&M-EST. PATIENT-LVL IV: ICD-10-PCS | Mod: PBBFAC,,, | Performed by: INTERNAL MEDICINE

## 2021-03-02 PROCEDURE — 99214 OFFICE O/P EST MOD 30 MIN: CPT | Mod: PBBFAC | Performed by: INTERNAL MEDICINE

## 2021-03-02 PROCEDURE — 1159F PR MEDICATION LIST DOCUMENTED IN MEDICAL RECORD: ICD-10-PCS | Mod: S$PBB,,, | Performed by: INTERNAL MEDICINE

## 2021-03-02 PROCEDURE — 99215 PR OFFICE/OUTPT VISIT, EST, LEVL V, 40-54 MIN: ICD-10-PCS | Mod: S$PBB,,, | Performed by: INTERNAL MEDICINE

## 2021-03-02 PROCEDURE — 1159F MED LIST DOCD IN RCRD: CPT | Mod: S$PBB,,, | Performed by: INTERNAL MEDICINE

## 2021-03-02 PROCEDURE — 99999 PR PBB SHADOW E&M-EST. PATIENT-LVL IV: CPT | Mod: PBBFAC,,, | Performed by: INTERNAL MEDICINE

## 2021-03-02 PROCEDURE — 99215 OFFICE O/P EST HI 40 MIN: CPT | Mod: S$PBB,,, | Performed by: INTERNAL MEDICINE

## 2021-03-02 RX ORDER — RABEPRAZOLE SODIUM 20 MG/1
20 TABLET, DELAYED RELEASE ORAL 2 TIMES DAILY
Qty: 60 TABLET | Refills: 11 | Status: SHIPPED | OUTPATIENT
Start: 2021-03-02 | End: 2022-03-09 | Stop reason: SDUPTHER

## 2021-03-03 ENCOUNTER — TELEPHONE (OUTPATIENT)
Dept: GASTROENTEROLOGY | Facility: CLINIC | Age: 71
End: 2021-03-03

## 2021-03-03 DIAGNOSIS — Z12.11 COLON CANCER SCREENING: Primary | ICD-10-CM

## 2021-03-03 RX ORDER — SODIUM, POTASSIUM,MAG SULFATES 17.5-3.13G
SOLUTION, RECONSTITUTED, ORAL ORAL
Qty: 1 KIT | Refills: 0 | Status: SHIPPED | OUTPATIENT
Start: 2021-03-03 | End: 2021-05-17

## 2021-03-03 NOTE — TELEPHONE ENCOUNTER
3/3/2021    Louise Parker (:  1958) is a 65 Sonya Lau MD Informed the patient of test results.   

## 2021-03-04 ENCOUNTER — TELEPHONE (OUTPATIENT)
Dept: GASTROENTEROLOGY | Facility: CLINIC | Age: 71
End: 2021-03-04

## 2021-03-04 DIAGNOSIS — K29.80 DUODENITIS: Primary | ICD-10-CM

## 2021-03-05 ENCOUNTER — PATIENT MESSAGE (OUTPATIENT)
Dept: INTERNAL MEDICINE | Facility: CLINIC | Age: 71
End: 2021-03-05

## 2021-03-06 ENCOUNTER — PATIENT MESSAGE (OUTPATIENT)
Dept: ENDOSCOPY | Facility: HOSPITAL | Age: 71
End: 2021-03-06

## 2021-03-07 ENCOUNTER — NURSE TRIAGE (OUTPATIENT)
Dept: ADMINISTRATIVE | Facility: CLINIC | Age: 71
End: 2021-03-07

## 2021-03-08 ENCOUNTER — LAB VISIT (OUTPATIENT)
Dept: PRIMARY CARE CLINIC | Facility: OTHER | Age: 71
End: 2021-03-08
Attending: INTERNAL MEDICINE
Payer: MEDICARE

## 2021-03-08 ENCOUNTER — TELEPHONE (OUTPATIENT)
Dept: INTERNAL MEDICINE | Facility: CLINIC | Age: 71
End: 2021-03-08

## 2021-03-08 DIAGNOSIS — Z20.822 ENCOUNTER FOR LABORATORY TESTING FOR COVID-19 VIRUS: ICD-10-CM

## 2021-03-08 PROCEDURE — U0003 INFECTIOUS AGENT DETECTION BY NUCLEIC ACID (DNA OR RNA); SEVERE ACUTE RESPIRATORY SYNDROME CORONAVIRUS 2 (SARS-COV-2) (CORONAVIRUS DISEASE [COVID-19]), AMPLIFIED PROBE TECHNIQUE, MAKING USE OF HIGH THROUGHPUT TECHNOLOGIES AS DESCRIBED BY CMS-2020-01-R: HCPCS | Performed by: INTERNAL MEDICINE

## 2021-03-09 LAB — SARS-COV-2 RNA RESP QL NAA+PROBE: NOT DETECTED

## 2021-03-10 ENCOUNTER — TELEPHONE (OUTPATIENT)
Dept: ENDOSCOPY | Facility: HOSPITAL | Age: 71
End: 2021-03-10

## 2021-03-10 ENCOUNTER — PATIENT MESSAGE (OUTPATIENT)
Dept: ENDOSCOPY | Facility: HOSPITAL | Age: 71
End: 2021-03-10

## 2021-03-15 ENCOUNTER — LAB VISIT (OUTPATIENT)
Dept: LAB | Facility: HOSPITAL | Age: 71
End: 2021-03-15
Attending: INTERNAL MEDICINE
Payer: MEDICARE

## 2021-03-15 ENCOUNTER — TELEPHONE (OUTPATIENT)
Dept: RHEUMATOLOGY | Facility: CLINIC | Age: 71
End: 2021-03-15

## 2021-03-15 DIAGNOSIS — Z79.899 OTHER LONG TERM (CURRENT) DRUG THERAPY: ICD-10-CM

## 2021-03-15 DIAGNOSIS — M34.9 SCLERODERMA: ICD-10-CM

## 2021-03-15 DIAGNOSIS — D64.9 ANEMIA, UNSPECIFIED TYPE: Primary | ICD-10-CM

## 2021-03-15 DIAGNOSIS — E83.51 HYPOCALCEMIA: ICD-10-CM

## 2021-03-15 DIAGNOSIS — K29.80 DUODENITIS: ICD-10-CM

## 2021-03-15 LAB
ALBUMIN SERPL BCP-MCNC: 3.5 G/DL (ref 3.5–5.2)
ALP SERPL-CCNC: 127 U/L (ref 55–135)
ALT SERPL W/O P-5'-P-CCNC: 10 U/L (ref 10–44)
ANION GAP SERPL CALC-SCNC: 8 MMOL/L (ref 8–16)
AST SERPL-CCNC: 18 U/L (ref 10–40)
BASOPHILS # BLD AUTO: 0.04 K/UL (ref 0–0.2)
BASOPHILS NFR BLD: 0.9 % (ref 0–1.9)
BILIRUB SERPL-MCNC: 0.3 MG/DL (ref 0.1–1)
BUN SERPL-MCNC: 15 MG/DL (ref 8–23)
CALCIUM SERPL-MCNC: 8.5 MG/DL (ref 8.7–10.5)
CHLORIDE SERPL-SCNC: 107 MMOL/L (ref 95–110)
CO2 SERPL-SCNC: 25 MMOL/L (ref 23–29)
CREAT SERPL-MCNC: 0.8 MG/DL (ref 0.5–1.4)
CRP SERPL-MCNC: 9.4 MG/L (ref 0–8.2)
DIFFERENTIAL METHOD: ABNORMAL
EOSINOPHIL # BLD AUTO: 0.1 K/UL (ref 0–0.5)
EOSINOPHIL NFR BLD: 1.3 % (ref 0–8)
ERYTHROCYTE [DISTWIDTH] IN BLOOD BY AUTOMATED COUNT: 18.6 % (ref 11.5–14.5)
ERYTHROCYTE [SEDIMENTATION RATE] IN BLOOD BY WESTERGREN METHOD: 58 MM/HR (ref 0–36)
EST. GFR  (AFRICAN AMERICAN): >60 ML/MIN/1.73 M^2
EST. GFR  (NON AFRICAN AMERICAN): >60 ML/MIN/1.73 M^2
GLUCOSE SERPL-MCNC: 98 MG/DL (ref 70–110)
HCT VFR BLD AUTO: 35 % (ref 37–48.5)
HGB BLD-MCNC: 11.1 G/DL (ref 12–16)
IMM GRANULOCYTES # BLD AUTO: 0.01 K/UL (ref 0–0.04)
IMM GRANULOCYTES NFR BLD AUTO: 0.2 % (ref 0–0.5)
LYMPHOCYTES # BLD AUTO: 1.5 K/UL (ref 1–4.8)
LYMPHOCYTES NFR BLD: 32.3 % (ref 18–48)
MCH RBC QN AUTO: 26.7 PG (ref 27–31)
MCHC RBC AUTO-ENTMCNC: 31.7 G/DL (ref 32–36)
MCV RBC AUTO: 84 FL (ref 82–98)
MONOCYTES # BLD AUTO: 0.4 K/UL (ref 0.3–1)
MONOCYTES NFR BLD: 9.3 % (ref 4–15)
NEUTROPHILS # BLD AUTO: 2.6 K/UL (ref 1.8–7.7)
NEUTROPHILS NFR BLD: 56 % (ref 38–73)
NRBC BLD-RTO: 0 /100 WBC
PLATELET # BLD AUTO: 197 K/UL (ref 150–350)
PMV BLD AUTO: 11 FL (ref 9.2–12.9)
POTASSIUM SERPL-SCNC: 3.5 MMOL/L (ref 3.5–5.1)
PROT SERPL-MCNC: 8 G/DL (ref 6–8.4)
RBC # BLD AUTO: 4.16 M/UL (ref 4–5.4)
SODIUM SERPL-SCNC: 140 MMOL/L (ref 136–145)
WBC # BLD AUTO: 4.61 K/UL (ref 3.9–12.7)

## 2021-03-15 PROCEDURE — 86140 C-REACTIVE PROTEIN: CPT | Performed by: INTERNAL MEDICINE

## 2021-03-15 PROCEDURE — 85025 COMPLETE CBC W/AUTO DIFF WBC: CPT | Performed by: INTERNAL MEDICINE

## 2021-03-15 PROCEDURE — 36415 COLL VENOUS BLD VENIPUNCTURE: CPT | Performed by: INTERNAL MEDICINE

## 2021-03-15 PROCEDURE — 81375 HLA II TYPING AG EQUIV LR: CPT | Mod: PO | Performed by: INTERNAL MEDICINE

## 2021-03-15 PROCEDURE — 85652 RBC SED RATE AUTOMATED: CPT | Performed by: INTERNAL MEDICINE

## 2021-03-15 PROCEDURE — 80053 COMPREHEN METABOLIC PANEL: CPT | Performed by: INTERNAL MEDICINE

## 2021-03-16 ENCOUNTER — OFFICE VISIT (OUTPATIENT)
Dept: RHEUMATOLOGY | Facility: CLINIC | Age: 71
End: 2021-03-16
Payer: MEDICARE

## 2021-03-16 VITALS
SYSTOLIC BLOOD PRESSURE: 132 MMHG | DIASTOLIC BLOOD PRESSURE: 65 MMHG | BODY MASS INDEX: 27.16 KG/M2 | HEIGHT: 65 IN | HEART RATE: 66 BPM | WEIGHT: 163 LBS

## 2021-03-16 DIAGNOSIS — M34.9 SCLERODERMA: Primary | ICD-10-CM

## 2021-03-16 DIAGNOSIS — I27.20 PULMONARY HYPERTENSION: ICD-10-CM

## 2021-03-16 DIAGNOSIS — K21.9 GASTROESOPHAGEAL REFLUX DISEASE, UNSPECIFIED WHETHER ESOPHAGITIS PRESENT: ICD-10-CM

## 2021-03-16 DIAGNOSIS — L97.529 SKIN ULCERS OF BOTH FEET: ICD-10-CM

## 2021-03-16 DIAGNOSIS — J84.9 ILD (INTERSTITIAL LUNG DISEASE): ICD-10-CM

## 2021-03-16 DIAGNOSIS — Z79.899 OTHER LONG TERM (CURRENT) DRUG THERAPY: ICD-10-CM

## 2021-03-16 DIAGNOSIS — L97.519 SKIN ULCERS OF BOTH FEET: ICD-10-CM

## 2021-03-16 DIAGNOSIS — I87.2 VENOUS INSUFFICIENCY OF BOTH LOWER EXTREMITIES: ICD-10-CM

## 2021-03-16 PROCEDURE — 99999 PR PBB SHADOW E&M-EST. PATIENT-LVL III: CPT | Mod: PBBFAC,GC,, | Performed by: STUDENT IN AN ORGANIZED HEALTH CARE EDUCATION/TRAINING PROGRAM

## 2021-03-16 PROCEDURE — 99214 OFFICE O/P EST MOD 30 MIN: CPT | Mod: S$PBB,GC,, | Performed by: STUDENT IN AN ORGANIZED HEALTH CARE EDUCATION/TRAINING PROGRAM

## 2021-03-16 PROCEDURE — 99214 PR OFFICE/OUTPT VISIT, EST, LEVL IV, 30-39 MIN: ICD-10-PCS | Mod: S$PBB,GC,, | Performed by: STUDENT IN AN ORGANIZED HEALTH CARE EDUCATION/TRAINING PROGRAM

## 2021-03-16 PROCEDURE — 99213 OFFICE O/P EST LOW 20 MIN: CPT | Mod: PBBFAC | Performed by: STUDENT IN AN ORGANIZED HEALTH CARE EDUCATION/TRAINING PROGRAM

## 2021-03-16 PROCEDURE — 99999 PR PBB SHADOW E&M-EST. PATIENT-LVL III: ICD-10-PCS | Mod: PBBFAC,GC,, | Performed by: STUDENT IN AN ORGANIZED HEALTH CARE EDUCATION/TRAINING PROGRAM

## 2021-03-17 ENCOUNTER — OFFICE VISIT (OUTPATIENT)
Dept: WOUND CARE | Facility: CLINIC | Age: 71
End: 2021-03-17
Payer: MEDICARE

## 2021-03-17 VITALS
TEMPERATURE: 98 F | HEART RATE: 77 BPM | DIASTOLIC BLOOD PRESSURE: 59 MMHG | BODY MASS INDEX: 26.45 KG/M2 | SYSTOLIC BLOOD PRESSURE: 142 MMHG | HEIGHT: 65 IN | WEIGHT: 158.75 LBS

## 2021-03-17 DIAGNOSIS — L97.519 SKIN ULCERS OF BOTH FEET: Primary | ICD-10-CM

## 2021-03-17 DIAGNOSIS — L97.529 SKIN ULCERS OF BOTH FEET: Primary | ICD-10-CM

## 2021-03-17 PROCEDURE — 99213 OFFICE O/P EST LOW 20 MIN: CPT | Mod: S$PBB,,, | Performed by: NURSE PRACTITIONER

## 2021-03-17 PROCEDURE — 99999 PR PBB SHADOW E&M-EST. PATIENT-LVL IV: CPT | Mod: PBBFAC,,, | Performed by: NURSE PRACTITIONER

## 2021-03-17 PROCEDURE — 99213 PR OFFICE/OUTPT VISIT, EST, LEVL III, 20-29 MIN: ICD-10-PCS | Mod: S$PBB,,, | Performed by: NURSE PRACTITIONER

## 2021-03-17 PROCEDURE — 99214 OFFICE O/P EST MOD 30 MIN: CPT | Mod: PBBFAC | Performed by: NURSE PRACTITIONER

## 2021-03-17 PROCEDURE — 99999 PR PBB SHADOW E&M-EST. PATIENT-LVL IV: ICD-10-PCS | Mod: PBBFAC,,, | Performed by: NURSE PRACTITIONER

## 2021-03-18 ENCOUNTER — PATIENT MESSAGE (OUTPATIENT)
Dept: ADMINISTRATIVE | Facility: OTHER | Age: 71
End: 2021-03-18

## 2021-03-22 ENCOUNTER — CLINICAL SUPPORT (OUTPATIENT)
Dept: GASTROENTEROLOGY | Facility: CLINIC | Age: 71
End: 2021-03-22
Payer: MEDICARE

## 2021-03-22 DIAGNOSIS — K31.84 GASTROPARESIS: ICD-10-CM

## 2021-03-22 DIAGNOSIS — R13.10 DYSPHAGIA, UNSPECIFIED TYPE: Primary | ICD-10-CM

## 2021-03-22 DIAGNOSIS — M34.9 SCLERODERMA WITH PULMONARY INVOLVEMENT: ICD-10-CM

## 2021-03-22 DIAGNOSIS — J84.9 ILD (INTERSTITIAL LUNG DISEASE): ICD-10-CM

## 2021-03-22 DIAGNOSIS — K21.9 GASTROESOPHAGEAL REFLUX DISEASE, UNSPECIFIED WHETHER ESOPHAGITIS PRESENT: ICD-10-CM

## 2021-03-22 DIAGNOSIS — E55.9 VITAMIN D DEFICIENCY: ICD-10-CM

## 2021-03-22 PROCEDURE — 99499 UNLISTED E&M SERVICE: CPT | Mod: 95,,, | Performed by: DIETITIAN, REGISTERED

## 2021-03-22 PROCEDURE — 99499 NO LOS: ICD-10-PCS | Mod: 95,,, | Performed by: DIETITIAN, REGISTERED

## 2021-03-25 ENCOUNTER — TELEPHONE (OUTPATIENT)
Dept: RHEUMATOLOGY | Facility: CLINIC | Age: 71
End: 2021-03-25

## 2021-03-25 ENCOUNTER — TELEPHONE (OUTPATIENT)
Dept: RHEUMATOLOGY | Facility: CLINIC | Age: 71
End: 2021-03-25
Payer: MEDICARE

## 2021-03-25 ENCOUNTER — PATIENT MESSAGE (OUTPATIENT)
Dept: ADMINISTRATIVE | Facility: OTHER | Age: 71
End: 2021-03-25

## 2021-03-25 ENCOUNTER — LAB VISIT (OUTPATIENT)
Dept: LAB | Facility: HOSPITAL | Age: 71
End: 2021-03-25
Attending: INTERNAL MEDICINE
Payer: MEDICARE

## 2021-03-25 DIAGNOSIS — M34.9 SCLERODERMA: ICD-10-CM

## 2021-03-25 DIAGNOSIS — E83.51 HYPOCALCEMIA: ICD-10-CM

## 2021-03-25 DIAGNOSIS — Z79.899 OTHER LONG TERM (CURRENT) DRUG THERAPY: ICD-10-CM

## 2021-03-25 DIAGNOSIS — D64.9 ANEMIA, UNSPECIFIED TYPE: ICD-10-CM

## 2021-03-25 LAB
25(OH)D3+25(OH)D2 SERPL-MCNC: 48 NG/ML (ref 30–96)
BASOPHILS # BLD AUTO: 0.03 K/UL (ref 0–0.2)
BASOPHILS NFR BLD: 1 % (ref 0–1.9)
CA-I BLDV-SCNC: 1.21 MMOL/L (ref 1.06–1.42)
CALCIUM SERPL-MCNC: 8.6 MG/DL (ref 8.7–10.5)
DIFFERENTIAL METHOD: ABNORMAL
EOSINOPHIL # BLD AUTO: 0 K/UL (ref 0–0.5)
EOSINOPHIL NFR BLD: 1 % (ref 0–8)
ERYTHROCYTE [DISTWIDTH] IN BLOOD BY AUTOMATED COUNT: 18.7 % (ref 11.5–14.5)
FERRITIN SERPL-MCNC: 6 NG/ML (ref 20–300)
FOLATE SERPL-MCNC: 14.8 NG/ML (ref 4–24)
HAPTOGLOB SERPL-MCNC: 137 MG/DL (ref 30–250)
HCT VFR BLD AUTO: 36.7 % (ref 37–48.5)
HGB BLD-MCNC: 11.4 G/DL (ref 12–16)
IMM GRANULOCYTES # BLD AUTO: 0.01 K/UL (ref 0–0.04)
IMM GRANULOCYTES NFR BLD AUTO: 0.3 % (ref 0–0.5)
IRON SERPL-MCNC: 29 UG/DL (ref 30–160)
LDH SERPL L TO P-CCNC: 149 U/L (ref 110–260)
LYMPHOCYTES # BLD AUTO: 1 K/UL (ref 1–4.8)
LYMPHOCYTES NFR BLD: 33.9 % (ref 18–48)
MAGNESIUM SERPL-MCNC: 2.1 MG/DL (ref 1.6–2.6)
MCH RBC QN AUTO: 25.9 PG (ref 27–31)
MCHC RBC AUTO-ENTMCNC: 31.1 G/DL (ref 32–36)
MCV RBC AUTO: 83 FL (ref 82–98)
MONOCYTES # BLD AUTO: 0.4 K/UL (ref 0.3–1)
MONOCYTES NFR BLD: 11.4 % (ref 4–15)
NEUTROPHILS # BLD AUTO: 1.6 K/UL (ref 1.8–7.7)
NEUTROPHILS NFR BLD: 52.4 % (ref 38–73)
NRBC BLD-RTO: 0 /100 WBC
PATH REV BLD -IMP: NORMAL
PATH REV BLD -IMP: NORMAL
PHOSPHATE SERPL-MCNC: 3.6 MG/DL (ref 2.7–4.5)
PLATELET # BLD AUTO: 202 K/UL (ref 150–350)
PMV BLD AUTO: 12.1 FL (ref 9.2–12.9)
RBC # BLD AUTO: 4.4 M/UL (ref 4–5.4)
RETICS/RBC NFR AUTO: 1.9 % (ref 0.5–2.5)
SATURATED IRON: 6 % (ref 20–50)
TOTAL IRON BINDING CAPACITY: 453 UG/DL (ref 250–450)
TRANSFERRIN SERPL-MCNC: 306 MG/DL (ref 200–375)
VIT B12 SERPL-MCNC: 622 PG/ML (ref 210–950)
WBC # BLD AUTO: 3.07 K/UL (ref 3.9–12.7)

## 2021-03-25 PROCEDURE — 85045 AUTOMATED RETICULOCYTE COUNT: CPT | Performed by: INTERNAL MEDICINE

## 2021-03-25 PROCEDURE — 82310 ASSAY OF CALCIUM: CPT | Performed by: INTERNAL MEDICINE

## 2021-03-25 PROCEDURE — 83010 ASSAY OF HAPTOGLOBIN QUANT: CPT | Performed by: INTERNAL MEDICINE

## 2021-03-25 PROCEDURE — 83735 ASSAY OF MAGNESIUM: CPT | Performed by: INTERNAL MEDICINE

## 2021-03-25 PROCEDURE — 83540 ASSAY OF IRON: CPT | Performed by: INTERNAL MEDICINE

## 2021-03-25 PROCEDURE — 84100 ASSAY OF PHOSPHORUS: CPT | Performed by: INTERNAL MEDICINE

## 2021-03-25 PROCEDURE — 85060 PATHOLOGIST REVIEW: ICD-10-PCS | Mod: ,,, | Performed by: PATHOLOGY

## 2021-03-25 PROCEDURE — 83615 LACTATE (LD) (LDH) ENZYME: CPT | Performed by: INTERNAL MEDICINE

## 2021-03-25 PROCEDURE — 82607 VITAMIN B-12: CPT | Performed by: INTERNAL MEDICINE

## 2021-03-25 PROCEDURE — 85060 BLOOD SMEAR INTERPRETATION: CPT | Mod: ,,, | Performed by: PATHOLOGY

## 2021-03-25 PROCEDURE — 36415 COLL VENOUS BLD VENIPUNCTURE: CPT | Performed by: INTERNAL MEDICINE

## 2021-03-25 PROCEDURE — 85025 COMPLETE CBC W/AUTO DIFF WBC: CPT | Performed by: INTERNAL MEDICINE

## 2021-03-25 PROCEDURE — 82746 ASSAY OF FOLIC ACID SERUM: CPT | Performed by: INTERNAL MEDICINE

## 2021-03-25 PROCEDURE — 82306 VITAMIN D 25 HYDROXY: CPT | Performed by: INTERNAL MEDICINE

## 2021-03-25 PROCEDURE — 82330 ASSAY OF CALCIUM: CPT | Performed by: INTERNAL MEDICINE

## 2021-03-25 PROCEDURE — 82728 ASSAY OF FERRITIN: CPT | Performed by: INTERNAL MEDICINE

## 2021-03-26 LAB
CELIA INTERPRETATION: NORMAL
CELIA TESTING DATE: NORMAL
HLA DQA1 1: NORMAL
HLA DQA1 2: NORMAL
HLA DRB4 1: NORMAL
HLA-DP 1 SERO. EQUIV: NORMAL
HLA-DP 2 SERO. EQUIV: NORMAL
HLA-DPA1 1: NORMAL
HLA-DPA1 2: NORMAL
HLA-DPB1 1: NORMAL
HLA-DPB1 2: NORMAL
HLA-DQ 1 SERO. EQUIV: 7
HLA-DQ 2 SERO. EQUIV: NORMAL
HLA-DQB1 1: NORMAL
HLA-DQB1 2: NORMAL
HLA-DRB1 1 SERO. EQUIV: NORMAL
HLA-DRB1 1: NORMAL
HLA-DRB1 2 SERO. EQUIV: NORMAL
HLA-DRB1 2: NORMAL
HLA-DRB3 1: NORMAL
HLA-DRB3 2: NORMAL
HLA-DRB345 1 SERO. EQUIV: NORMAL
HLA-DRB345 2 SERO. EQUIV: NORMAL
HLA-DRB4 2: NORMAL
HLA-DRB5 1: NORMAL
HLA-DRB5 2: NORMAL

## 2021-04-06 ENCOUNTER — PATIENT MESSAGE (OUTPATIENT)
Dept: PHARMACY | Facility: CLINIC | Age: 71
End: 2021-04-06

## 2021-04-08 ENCOUNTER — LAB VISIT (OUTPATIENT)
Dept: LAB | Facility: HOSPITAL | Age: 71
End: 2021-04-08
Attending: INTERNAL MEDICINE
Payer: MEDICARE

## 2021-04-08 DIAGNOSIS — M34.9 SCLERODERMA: ICD-10-CM

## 2021-04-08 LAB
BASOPHILS # BLD AUTO: 0.03 K/UL (ref 0–0.2)
BASOPHILS NFR BLD: 0.8 % (ref 0–1.9)
DIFFERENTIAL METHOD: ABNORMAL
EOSINOPHIL # BLD AUTO: 0 K/UL (ref 0–0.5)
EOSINOPHIL NFR BLD: 1 % (ref 0–8)
ERYTHROCYTE [DISTWIDTH] IN BLOOD BY AUTOMATED COUNT: 20.4 % (ref 11.5–14.5)
HCT VFR BLD AUTO: 38.3 % (ref 37–48.5)
HGB BLD-MCNC: 12.2 G/DL (ref 12–16)
IMM GRANULOCYTES # BLD AUTO: 0 K/UL (ref 0–0.04)
IMM GRANULOCYTES NFR BLD AUTO: 0 % (ref 0–0.5)
LYMPHOCYTES # BLD AUTO: 1.6 K/UL (ref 1–4.8)
LYMPHOCYTES NFR BLD: 40.3 % (ref 18–48)
MCH RBC QN AUTO: 27.5 PG (ref 27–31)
MCHC RBC AUTO-ENTMCNC: 31.9 G/DL (ref 32–36)
MCV RBC AUTO: 86 FL (ref 82–98)
MONOCYTES # BLD AUTO: 0.4 K/UL (ref 0.3–1)
MONOCYTES NFR BLD: 9.6 % (ref 4–15)
NEUTROPHILS # BLD AUTO: 1.9 K/UL (ref 1.8–7.7)
NEUTROPHILS NFR BLD: 48.3 % (ref 38–73)
NRBC BLD-RTO: 0 /100 WBC
PLATELET # BLD AUTO: 189 K/UL (ref 150–450)
PMV BLD AUTO: 11.7 FL (ref 9.2–12.9)
RBC # BLD AUTO: 4.44 M/UL (ref 4–5.4)
WBC # BLD AUTO: 3.87 K/UL (ref 3.9–12.7)

## 2021-04-08 PROCEDURE — 36415 COLL VENOUS BLD VENIPUNCTURE: CPT | Performed by: INTERNAL MEDICINE

## 2021-04-08 PROCEDURE — 85025 COMPLETE CBC W/AUTO DIFF WBC: CPT | Performed by: INTERNAL MEDICINE

## 2021-04-12 ENCOUNTER — PATIENT MESSAGE (OUTPATIENT)
Dept: PHARMACY | Facility: CLINIC | Age: 71
End: 2021-04-12

## 2021-04-19 ENCOUNTER — PATIENT MESSAGE (OUTPATIENT)
Dept: PHARMACY | Facility: CLINIC | Age: 71
End: 2021-04-19

## 2021-04-23 ENCOUNTER — TELEPHONE (OUTPATIENT)
Dept: GASTROENTEROLOGY | Facility: CLINIC | Age: 71
End: 2021-04-23

## 2021-04-23 ENCOUNTER — SPECIALTY PHARMACY (OUTPATIENT)
Dept: PHARMACY | Facility: CLINIC | Age: 71
End: 2021-04-23

## 2021-04-23 ENCOUNTER — TELEPHONE (OUTPATIENT)
Dept: PHARMACY | Facility: CLINIC | Age: 71
End: 2021-04-23

## 2021-04-28 ENCOUNTER — TELEPHONE (OUTPATIENT)
Dept: GASTROENTEROLOGY | Facility: CLINIC | Age: 71
End: 2021-04-28

## 2021-05-03 ENCOUNTER — TELEPHONE (OUTPATIENT)
Dept: GASTROENTEROLOGY | Facility: CLINIC | Age: 71
End: 2021-05-03

## 2021-05-04 ENCOUNTER — TELEPHONE (OUTPATIENT)
Dept: PAIN MEDICINE | Facility: CLINIC | Age: 71
End: 2021-05-04

## 2021-05-05 ENCOUNTER — OFFICE VISIT (OUTPATIENT)
Dept: PAIN MEDICINE | Facility: CLINIC | Age: 71
End: 2021-05-05
Payer: MEDICARE

## 2021-05-05 VITALS
DIASTOLIC BLOOD PRESSURE: 66 MMHG | HEIGHT: 65 IN | WEIGHT: 158.5 LBS | BODY MASS INDEX: 26.41 KG/M2 | SYSTOLIC BLOOD PRESSURE: 138 MMHG | RESPIRATION RATE: 18 BRPM | HEART RATE: 67 BPM

## 2021-05-05 DIAGNOSIS — M50.30 DDD (DEGENERATIVE DISC DISEASE), CERVICAL: ICD-10-CM

## 2021-05-05 DIAGNOSIS — G60.9 IDIOPATHIC NEUROPATHY: ICD-10-CM

## 2021-05-05 DIAGNOSIS — M34.89 CUTANEOUS SCLERODERMA: ICD-10-CM

## 2021-05-05 DIAGNOSIS — G89.4 CHRONIC PAIN DISORDER: Primary | ICD-10-CM

## 2021-05-05 DIAGNOSIS — M79.18 MYOFASCIAL PAIN: ICD-10-CM

## 2021-05-05 DIAGNOSIS — M47.812 CERVICAL SPONDYLOSIS: ICD-10-CM

## 2021-05-05 PROCEDURE — 99213 OFFICE O/P EST LOW 20 MIN: CPT | Mod: S$PBB,,, | Performed by: NURSE PRACTITIONER

## 2021-05-05 PROCEDURE — 99213 PR OFFICE/OUTPT VISIT, EST, LEVL III, 20-29 MIN: ICD-10-PCS | Mod: S$PBB,,, | Performed by: NURSE PRACTITIONER

## 2021-05-05 PROCEDURE — 99214 OFFICE O/P EST MOD 30 MIN: CPT | Mod: PBBFAC | Performed by: NURSE PRACTITIONER

## 2021-05-05 PROCEDURE — 99999 PR PBB SHADOW E&M-EST. PATIENT-LVL IV: ICD-10-PCS | Mod: PBBFAC,,, | Performed by: NURSE PRACTITIONER

## 2021-05-05 PROCEDURE — 99999 PR PBB SHADOW E&M-EST. PATIENT-LVL IV: CPT | Mod: PBBFAC,,, | Performed by: NURSE PRACTITIONER

## 2021-05-05 RX ORDER — TIZANIDINE 4 MG/1
4 TABLET ORAL NIGHTLY PRN
Qty: 30 TABLET | Refills: 3 | Status: SHIPPED | OUTPATIENT
Start: 2021-05-05 | End: 2021-11-24

## 2021-05-06 DIAGNOSIS — G89.4 CHRONIC PAIN DISORDER: Primary | ICD-10-CM

## 2021-05-06 DIAGNOSIS — M34.89 CUTANEOUS SCLERODERMA: ICD-10-CM

## 2021-05-06 DIAGNOSIS — G60.9 IDIOPATHIC NEUROPATHY: ICD-10-CM

## 2021-05-06 RX ORDER — PREGABALIN 300 MG/1
300 CAPSULE ORAL 2 TIMES DAILY
Qty: 60 CAPSULE | Refills: 6 | Status: SHIPPED | OUTPATIENT
Start: 2021-05-06 | End: 2021-05-20 | Stop reason: SDUPTHER

## 2021-05-07 ENCOUNTER — HOSPITAL ENCOUNTER (OUTPATIENT)
Dept: PREADMISSION TESTING | Facility: OTHER | Age: 71
Discharge: HOME | End: 2021-05-07
Attending: ANESTHESIOLOGY
Payer: MEDICARE

## 2021-05-07 DIAGNOSIS — Z01.818 PRE-OP TESTING: ICD-10-CM

## 2021-05-07 LAB — SARS-COV-2 RNA RESP QL NAA+PROBE: NOT DETECTED

## 2021-05-07 PROCEDURE — U0005 INFEC AGEN DETEC AMPLI PROBE: HCPCS | Performed by: FAMILY MEDICINE

## 2021-05-07 PROCEDURE — U0003 INFECTIOUS AGENT DETECTION BY NUCLEIC ACID (DNA OR RNA); SEVERE ACUTE RESPIRATORY SYNDROME CORONAVIRUS 2 (SARS-COV-2) (CORONAVIRUS DISEASE [COVID-19]), AMPLIFIED PROBE TECHNIQUE, MAKING USE OF HIGH THROUGHPUT TECHNOLOGIES AS DESCRIBED BY CMS-2020-01-R: HCPCS | Performed by: FAMILY MEDICINE

## 2021-05-10 ENCOUNTER — ANESTHESIA EVENT (OUTPATIENT)
Dept: ENDOSCOPY | Facility: HOSPITAL | Age: 71
End: 2021-05-10
Payer: MEDICARE

## 2021-05-10 ENCOUNTER — TELEPHONE (OUTPATIENT)
Dept: GASTROENTEROLOGY | Facility: CLINIC | Age: 71
End: 2021-05-10

## 2021-05-10 ENCOUNTER — HOSPITAL ENCOUNTER (OUTPATIENT)
Facility: HOSPITAL | Age: 71
Discharge: HOME | End: 2021-05-10
Attending: INTERNAL MEDICINE | Admitting: INTERNAL MEDICINE
Payer: MEDICARE

## 2021-05-10 ENCOUNTER — ANESTHESIA (OUTPATIENT)
Dept: ENDOSCOPY | Facility: HOSPITAL | Age: 71
End: 2021-05-10
Payer: MEDICARE

## 2021-05-10 VITALS
SYSTOLIC BLOOD PRESSURE: 115 MMHG | DIASTOLIC BLOOD PRESSURE: 61 MMHG | BODY MASS INDEX: 25.49 KG/M2 | HEART RATE: 62 BPM | WEIGHT: 153 LBS | TEMPERATURE: 98 F | HEIGHT: 65 IN | RESPIRATION RATE: 16 BRPM | OXYGEN SATURATION: 98 %

## 2021-05-10 DIAGNOSIS — D50.9 IRON DEFICIENCY ANEMIA, UNSPECIFIED IRON DEFICIENCY ANEMIA TYPE: Primary | ICD-10-CM

## 2021-05-10 DIAGNOSIS — D50.9 IDA (IRON DEFICIENCY ANEMIA): ICD-10-CM

## 2021-05-10 PROCEDURE — 45380 PR COLONOSCOPY,BIOPSY: ICD-10-PCS | Mod: ,,, | Performed by: INTERNAL MEDICINE

## 2021-05-10 PROCEDURE — 88305 TISSUE EXAM BY PATHOLOGIST: CPT | Performed by: PATHOLOGY

## 2021-05-10 PROCEDURE — D9220A PRA ANESTHESIA: Mod: CRNA,,, | Performed by: NURSE ANESTHETIST, CERTIFIED REGISTERED

## 2021-05-10 PROCEDURE — D9220A PRA ANESTHESIA: ICD-10-PCS | Mod: ANES,,, | Performed by: ANESTHESIOLOGY

## 2021-05-10 PROCEDURE — 88305 TISSUE EXAM BY PATHOLOGIST: ICD-10-PCS | Mod: 26,,, | Performed by: PATHOLOGY

## 2021-05-10 PROCEDURE — 91122 HC ANORECTAL MANOMETRY: CPT | Performed by: INTERNAL MEDICINE

## 2021-05-10 PROCEDURE — 88305 TISSUE EXAM BY PATHOLOGIST: CPT | Mod: 26,,, | Performed by: PATHOLOGY

## 2021-05-10 PROCEDURE — 37000008 HC ANESTHESIA 1ST 15 MINUTES: Performed by: INTERNAL MEDICINE

## 2021-05-10 PROCEDURE — D9220A PRA ANESTHESIA: Mod: ANES,,, | Performed by: ANESTHESIOLOGY

## 2021-05-10 PROCEDURE — D9220A PRA ANESTHESIA: ICD-10-PCS | Mod: CRNA,,, | Performed by: NURSE ANESTHETIST, CERTIFIED REGISTERED

## 2021-05-10 PROCEDURE — 25000003 PHARM REV CODE 250: Performed by: INTERNAL MEDICINE

## 2021-05-10 PROCEDURE — 27201012 HC FORCEPS, HOT/COLD, DISP: Performed by: INTERNAL MEDICINE

## 2021-05-10 PROCEDURE — 25000003 PHARM REV CODE 250: Performed by: NURSE ANESTHETIST, CERTIFIED REGISTERED

## 2021-05-10 PROCEDURE — 45380 COLONOSCOPY AND BIOPSY: CPT | Mod: ,,, | Performed by: INTERNAL MEDICINE

## 2021-05-10 PROCEDURE — 45380 COLONOSCOPY AND BIOPSY: CPT | Performed by: INTERNAL MEDICINE

## 2021-05-10 PROCEDURE — 37000009 HC ANESTHESIA EA ADD 15 MINS: Performed by: INTERNAL MEDICINE

## 2021-05-10 PROCEDURE — 63600175 PHARM REV CODE 636 W HCPCS: Performed by: NURSE ANESTHETIST, CERTIFIED REGISTERED

## 2021-05-10 RX ORDER — PROPOFOL 10 MG/ML
VIAL (ML) INTRAVENOUS
Status: DISCONTINUED | OUTPATIENT
Start: 2021-05-10 | End: 2021-05-10

## 2021-05-10 RX ORDER — LIDOCAINE HYDROCHLORIDE 20 MG/ML
INJECTION, SOLUTION EPIDURAL; INFILTRATION; INTRACAUDAL; PERINEURAL
Status: DISCONTINUED | OUTPATIENT
Start: 2021-05-10 | End: 2021-05-10

## 2021-05-10 RX ORDER — PROPOFOL 10 MG/ML
VIAL (ML) INTRAVENOUS CONTINUOUS PRN
Status: DISCONTINUED | OUTPATIENT
Start: 2021-05-10 | End: 2021-05-10

## 2021-05-10 RX ORDER — SODIUM CHLORIDE 0.9 % (FLUSH) 0.9 %
10 SYRINGE (ML) INJECTION
Status: DISCONTINUED | OUTPATIENT
Start: 2021-05-10 | End: 2021-05-10 | Stop reason: HOSPADM

## 2021-05-10 RX ORDER — SODIUM CHLORIDE 9 MG/ML
INJECTION, SOLUTION INTRAVENOUS CONTINUOUS
Status: DISCONTINUED | OUTPATIENT
Start: 2021-05-10 | End: 2021-05-10 | Stop reason: HOSPADM

## 2021-05-10 RX ORDER — SODIUM CHLORIDE 0.9 % (FLUSH) 0.9 %
3 SYRINGE (ML) INJECTION
Status: DISCONTINUED | OUTPATIENT
Start: 2021-05-10 | End: 2021-05-10 | Stop reason: HOSPADM

## 2021-05-10 RX ADMIN — LIDOCAINE HYDROCHLORIDE 60 MG: 20 INJECTION, SOLUTION EPIDURAL; INFILTRATION; INTRACAUDAL at 01:05

## 2021-05-10 RX ADMIN — PROPOFOL 20 MG: 10 INJECTION, EMULSION INTRAVENOUS at 01:05

## 2021-05-10 RX ADMIN — PROPOFOL 80 MG: 10 INJECTION, EMULSION INTRAVENOUS at 01:05

## 2021-05-10 RX ADMIN — SODIUM CHLORIDE: 0.9 INJECTION, SOLUTION INTRAVENOUS at 12:05

## 2021-05-10 RX ADMIN — Medication 150 MCG/KG/MIN: at 01:05

## 2021-05-12 ENCOUNTER — TELEPHONE (OUTPATIENT)
Dept: GASTROENTEROLOGY | Facility: CLINIC | Age: 71
End: 2021-05-12

## 2021-05-12 DIAGNOSIS — R15.9 FECAL INCONTINENCE WITH INCOMPLETE DEFECATION: Primary | ICD-10-CM

## 2021-05-12 DIAGNOSIS — R15.0 FECAL INCONTINENCE WITH INCOMPLETE DEFECATION: Primary | ICD-10-CM

## 2021-05-12 PROCEDURE — 91120 PR RECTAL SENSATION TEST, BALLOON: CPT | Mod: 26,,, | Performed by: INTERNAL MEDICINE

## 2021-05-12 PROCEDURE — 91120 PR RECTAL SENSATION TEST, BALLOON: ICD-10-PCS | Mod: 26,,, | Performed by: INTERNAL MEDICINE

## 2021-05-12 PROCEDURE — 91122 PR ANAL PRESSURE RECORD: ICD-10-PCS | Mod: 26,,, | Performed by: INTERNAL MEDICINE

## 2021-05-12 PROCEDURE — 91122 PR ANAL PRESSURE RECORD: CPT | Mod: 26,,, | Performed by: INTERNAL MEDICINE

## 2021-05-13 ENCOUNTER — TELEPHONE (OUTPATIENT)
Dept: TRANSPLANT | Facility: CLINIC | Age: 71
End: 2021-05-13

## 2021-05-13 ENCOUNTER — TELEPHONE (OUTPATIENT)
Dept: GASTROENTEROLOGY | Facility: CLINIC | Age: 71
End: 2021-05-13

## 2021-05-14 ENCOUNTER — PATIENT MESSAGE (OUTPATIENT)
Dept: GASTROENTEROLOGY | Facility: CLINIC | Age: 71
End: 2021-05-14

## 2021-05-14 ENCOUNTER — TELEPHONE (OUTPATIENT)
Dept: GASTROENTEROLOGY | Facility: CLINIC | Age: 71
End: 2021-05-14

## 2021-05-14 LAB
FINAL PATHOLOGIC DIAGNOSIS: NORMAL
GROSS: NORMAL
Lab: NORMAL

## 2021-05-17 ENCOUNTER — TELEPHONE (OUTPATIENT)
Dept: GASTROENTEROLOGY | Facility: CLINIC | Age: 71
End: 2021-05-17

## 2021-05-18 ENCOUNTER — TELEPHONE (OUTPATIENT)
Dept: GASTROENTEROLOGY | Facility: CLINIC | Age: 71
End: 2021-05-18

## 2021-05-18 ENCOUNTER — OFFICE VISIT (OUTPATIENT)
Dept: GASTROENTEROLOGY | Facility: CLINIC | Age: 71
End: 2021-05-18
Payer: MEDICARE

## 2021-05-18 VITALS
DIASTOLIC BLOOD PRESSURE: 58 MMHG | SYSTOLIC BLOOD PRESSURE: 115 MMHG | HEART RATE: 68 BPM | BODY MASS INDEX: 26.22 KG/M2 | HEIGHT: 65 IN | WEIGHT: 157.38 LBS | OXYGEN SATURATION: 99 %

## 2021-05-18 DIAGNOSIS — R13.10 DYSPHAGIA, UNSPECIFIED TYPE: ICD-10-CM

## 2021-05-18 DIAGNOSIS — R68.81 EARLY SATIETY: ICD-10-CM

## 2021-05-18 DIAGNOSIS — K21.9 GASTROESOPHAGEAL REFLUX DISEASE, UNSPECIFIED WHETHER ESOPHAGITIS PRESENT: ICD-10-CM

## 2021-05-18 DIAGNOSIS — R15.9 INCONTINENCE OF FECES, UNSPECIFIED FECAL INCONTINENCE TYPE: ICD-10-CM

## 2021-05-18 DIAGNOSIS — K59.00 CONSTIPATION, UNSPECIFIED CONSTIPATION TYPE: ICD-10-CM

## 2021-05-18 DIAGNOSIS — D50.9 IRON DEFICIENCY ANEMIA, UNSPECIFIED IRON DEFICIENCY ANEMIA TYPE: Primary | ICD-10-CM

## 2021-05-18 PROCEDURE — 99214 PR OFFICE/OUTPT VISIT, EST, LEVL IV, 30-39 MIN: ICD-10-PCS | Mod: S$PBB,,, | Performed by: INTERNAL MEDICINE

## 2021-05-18 PROCEDURE — 99214 OFFICE O/P EST MOD 30 MIN: CPT | Mod: S$PBB,,, | Performed by: INTERNAL MEDICINE

## 2021-05-18 PROCEDURE — 99999 PR PBB SHADOW E&M-EST. PATIENT-LVL V: ICD-10-PCS | Mod: PBBFAC,,, | Performed by: INTERNAL MEDICINE

## 2021-05-18 PROCEDURE — 99999 PR PBB SHADOW E&M-EST. PATIENT-LVL V: CPT | Mod: PBBFAC,,, | Performed by: INTERNAL MEDICINE

## 2021-05-18 PROCEDURE — 99215 OFFICE O/P EST HI 40 MIN: CPT | Mod: PBBFAC | Performed by: INTERNAL MEDICINE

## 2021-05-19 ENCOUNTER — TELEPHONE (OUTPATIENT)
Dept: PAIN MEDICINE | Facility: CLINIC | Age: 71
End: 2021-05-19

## 2021-05-19 ENCOUNTER — OFFICE VISIT (OUTPATIENT)
Dept: WOUND CARE | Facility: CLINIC | Age: 71
End: 2021-05-19
Payer: MEDICARE

## 2021-05-19 VITALS
DIASTOLIC BLOOD PRESSURE: 63 MMHG | WEIGHT: 154.75 LBS | SYSTOLIC BLOOD PRESSURE: 158 MMHG | HEART RATE: 61 BPM | BODY MASS INDEX: 25.75 KG/M2 | TEMPERATURE: 99 F

## 2021-05-19 DIAGNOSIS — L97.529 SKIN ULCERS OF BOTH FEET: Primary | ICD-10-CM

## 2021-05-19 DIAGNOSIS — L97.519 SKIN ULCERS OF BOTH FEET: Primary | ICD-10-CM

## 2021-05-19 DIAGNOSIS — L97.521 ULCER OF LEFT FOOT, LIMITED TO BREAKDOWN OF SKIN: ICD-10-CM

## 2021-05-19 PROCEDURE — 99999 PR PBB SHADOW E&M-EST. PATIENT-LVL V: CPT | Mod: PBBFAC,,, | Performed by: NURSE PRACTITIONER

## 2021-05-19 PROCEDURE — 99213 PR OFFICE/OUTPT VISIT, EST, LEVL III, 20-29 MIN: ICD-10-PCS | Mod: S$PBB,,, | Performed by: NURSE PRACTITIONER

## 2021-05-19 PROCEDURE — 99999 PR PBB SHADOW E&M-EST. PATIENT-LVL V: ICD-10-PCS | Mod: PBBFAC,,, | Performed by: NURSE PRACTITIONER

## 2021-05-19 PROCEDURE — 99213 OFFICE O/P EST LOW 20 MIN: CPT | Mod: S$PBB,,, | Performed by: NURSE PRACTITIONER

## 2021-05-19 PROCEDURE — 99215 OFFICE O/P EST HI 40 MIN: CPT | Mod: PBBFAC | Performed by: NURSE PRACTITIONER

## 2021-05-19 RX ORDER — MUPIROCIN 20 MG/G
OINTMENT TOPICAL DAILY
Qty: 30 G | Refills: 1 | Status: SHIPPED | OUTPATIENT
Start: 2021-05-19 | End: 2021-05-26

## 2021-05-20 DIAGNOSIS — K59.00 CONSTIPATION, UNSPECIFIED CONSTIPATION TYPE: ICD-10-CM

## 2021-05-20 DIAGNOSIS — G89.4 CHRONIC PAIN DISORDER: ICD-10-CM

## 2021-05-20 DIAGNOSIS — M34.89 CUTANEOUS SCLERODERMA: ICD-10-CM

## 2021-05-20 DIAGNOSIS — G60.9 IDIOPATHIC NEUROPATHY: ICD-10-CM

## 2021-05-20 RX ORDER — SODIUM FLUORIDE/POTASSIUM NIT 1.1 %-5 %
PASTE (ML) DENTAL
OUTPATIENT
Start: 2021-05-20

## 2021-05-20 RX ORDER — PREGABALIN 300 MG/1
300 CAPSULE ORAL 2 TIMES DAILY
Qty: 60 CAPSULE | Refills: 6 | Status: SHIPPED | OUTPATIENT
Start: 2021-05-20 | End: 2021-09-17 | Stop reason: SDUPTHER

## 2021-05-25 ENCOUNTER — OFFICE VISIT (OUTPATIENT)
Dept: RHEUMATOLOGY | Facility: CLINIC | Age: 71
End: 2021-05-25
Payer: MEDICARE

## 2021-05-25 ENCOUNTER — TELEPHONE (OUTPATIENT)
Dept: PHARMACY | Facility: CLINIC | Age: 71
End: 2021-05-25

## 2021-05-25 ENCOUNTER — HOSPITAL ENCOUNTER (OUTPATIENT)
Dept: PULMONOLOGY | Facility: CLINIC | Age: 71
Discharge: HOME | End: 2021-05-25
Payer: MEDICARE

## 2021-05-25 VITALS
HEIGHT: 65 IN | BODY MASS INDEX: 26.33 KG/M2 | WEIGHT: 158 LBS | SYSTOLIC BLOOD PRESSURE: 109 MMHG | DIASTOLIC BLOOD PRESSURE: 60 MMHG | HEART RATE: 58 BPM

## 2021-05-25 DIAGNOSIS — L97.529 SKIN ULCERS OF BOTH FEET: ICD-10-CM

## 2021-05-25 DIAGNOSIS — L97.519 SKIN ULCERS OF BOTH FEET: ICD-10-CM

## 2021-05-25 DIAGNOSIS — M34.9 SCLERODERMA: Primary | ICD-10-CM

## 2021-05-25 DIAGNOSIS — J84.9 ILD (INTERSTITIAL LUNG DISEASE): ICD-10-CM

## 2021-05-25 DIAGNOSIS — I87.2 VENOUS INSUFFICIENCY OF BOTH LOWER EXTREMITIES: ICD-10-CM

## 2021-05-25 DIAGNOSIS — Z79.899 OTHER LONG TERM (CURRENT) DRUG THERAPY: ICD-10-CM

## 2021-05-25 DIAGNOSIS — K21.9 GASTROESOPHAGEAL REFLUX DISEASE, UNSPECIFIED WHETHER ESOPHAGITIS PRESENT: ICD-10-CM

## 2021-05-25 DIAGNOSIS — M85.80 OSTEOPENIA, UNSPECIFIED LOCATION: ICD-10-CM

## 2021-05-25 DIAGNOSIS — E55.9 VITAMIN D DEFICIENCY: ICD-10-CM

## 2021-05-25 PROCEDURE — 99999 PR PBB SHADOW E&M-EST. PATIENT-LVL IV: CPT | Mod: PBBFAC,GC,, | Performed by: STUDENT IN AN ORGANIZED HEALTH CARE EDUCATION/TRAINING PROGRAM

## 2021-05-25 PROCEDURE — 94727 GAS DIL/WSHOT DETER LNG VOL: CPT | Mod: PBBFAC | Performed by: INTERNAL MEDICINE

## 2021-05-25 PROCEDURE — 94060 EVALUATION OF WHEEZING: CPT | Mod: PBBFAC | Performed by: INTERNAL MEDICINE

## 2021-05-25 PROCEDURE — 94729 PR C02/MEMBANE DIFFUSE CAPACITY: ICD-10-PCS | Mod: 26,S$PBB,, | Performed by: INTERNAL MEDICINE

## 2021-05-25 PROCEDURE — 99999 PR PBB SHADOW E&M-EST. PATIENT-LVL IV: ICD-10-PCS | Mod: PBBFAC,GC,, | Performed by: STUDENT IN AN ORGANIZED HEALTH CARE EDUCATION/TRAINING PROGRAM

## 2021-05-25 PROCEDURE — 94729 DIFFUSING CAPACITY: CPT | Mod: PBBFAC | Performed by: INTERNAL MEDICINE

## 2021-05-25 PROCEDURE — 94060 EVALUATION OF WHEEZING: CPT | Mod: 26,S$PBB,, | Performed by: INTERNAL MEDICINE

## 2021-05-25 PROCEDURE — 94729 DIFFUSING CAPACITY: CPT | Mod: 26,S$PBB,, | Performed by: INTERNAL MEDICINE

## 2021-05-25 PROCEDURE — 94060 PR EVAL OF BRONCHOSPASM: ICD-10-PCS | Mod: 26,S$PBB,, | Performed by: INTERNAL MEDICINE

## 2021-05-25 PROCEDURE — 99214 OFFICE O/P EST MOD 30 MIN: CPT | Mod: S$PBB,GC,, | Performed by: STUDENT IN AN ORGANIZED HEALTH CARE EDUCATION/TRAINING PROGRAM

## 2021-05-25 PROCEDURE — 99214 PR OFFICE/OUTPT VISIT, EST, LEVL IV, 30-39 MIN: ICD-10-PCS | Mod: S$PBB,GC,, | Performed by: STUDENT IN AN ORGANIZED HEALTH CARE EDUCATION/TRAINING PROGRAM

## 2021-05-25 PROCEDURE — 94727 GAS DIL/WSHOT DETER LNG VOL: CPT | Mod: 26,S$PBB,, | Performed by: INTERNAL MEDICINE

## 2021-05-25 PROCEDURE — 94727 PR PULM FUNCTION TEST BY GAS: ICD-10-PCS | Mod: 26,S$PBB,, | Performed by: INTERNAL MEDICINE

## 2021-05-25 PROCEDURE — 99214 OFFICE O/P EST MOD 30 MIN: CPT | Mod: PBBFAC | Performed by: STUDENT IN AN ORGANIZED HEALTH CARE EDUCATION/TRAINING PROGRAM

## 2021-05-25 RX ORDER — MYCOPHENOLATE MOFETIL 200 MG/ML
1000 POWDER, FOR SUSPENSION ORAL 2 TIMES DAILY
Qty: 480 ML | Refills: 1 | Status: SHIPPED | OUTPATIENT
Start: 2021-05-25 | End: 2021-05-25

## 2021-05-25 RX ORDER — FERROUS SULFATE 325(65) MG
325 TABLET, DELAYED RELEASE (ENTERIC COATED) ORAL EVERY MORNING
COMMUNITY
End: 2023-09-18

## 2021-05-25 RX ORDER — MYCOPHENOLATE MOFETIL 200 MG/ML
1000 POWDER, FOR SUSPENSION ORAL 2 TIMES DAILY
Qty: 480 ML | Refills: 1 | Status: SHIPPED | OUTPATIENT
Start: 2021-05-25 | End: 2021-10-26 | Stop reason: SDUPTHER

## 2021-05-27 ENCOUNTER — TELEPHONE (OUTPATIENT)
Dept: PHARMACY | Facility: CLINIC | Age: 71
End: 2021-05-27

## 2021-05-31 ENCOUNTER — PATIENT MESSAGE (OUTPATIENT)
Dept: GASTROENTEROLOGY | Facility: CLINIC | Age: 71
End: 2021-05-31

## 2021-05-31 ENCOUNTER — SPECIALTY PHARMACY (OUTPATIENT)
Dept: PHARMACY | Facility: CLINIC | Age: 71
End: 2021-05-31

## 2021-06-02 ENCOUNTER — TELEPHONE (OUTPATIENT)
Dept: PAIN MEDICINE | Facility: CLINIC | Age: 71
End: 2021-06-02

## 2021-06-02 ENCOUNTER — PATIENT MESSAGE (OUTPATIENT)
Dept: PHARMACY | Facility: CLINIC | Age: 71
End: 2021-06-02

## 2021-06-03 ENCOUNTER — TELEPHONE (OUTPATIENT)
Dept: GASTROENTEROLOGY | Facility: CLINIC | Age: 71
End: 2021-06-03

## 2021-06-03 ENCOUNTER — CLINICAL SUPPORT (OUTPATIENT)
Dept: GASTROENTEROLOGY | Facility: CLINIC | Age: 71
End: 2021-06-03
Payer: MEDICARE

## 2021-06-03 ENCOUNTER — OFFICE VISIT (OUTPATIENT)
Dept: PAIN MEDICINE | Facility: CLINIC | Age: 71
End: 2021-06-03
Payer: MEDICARE

## 2021-06-03 VITALS
WEIGHT: 153 LBS | HEART RATE: 56 BPM | SYSTOLIC BLOOD PRESSURE: 140 MMHG | BODY MASS INDEX: 25.49 KG/M2 | TEMPERATURE: 97 F | RESPIRATION RATE: 18 BRPM | HEIGHT: 65 IN | DIASTOLIC BLOOD PRESSURE: 67 MMHG

## 2021-06-03 DIAGNOSIS — G89.4 CHRONIC PAIN DISORDER: Primary | ICD-10-CM

## 2021-06-03 DIAGNOSIS — G60.9 IDIOPATHIC NEUROPATHY: ICD-10-CM

## 2021-06-03 DIAGNOSIS — M47.812 CERVICAL SPONDYLOSIS: ICD-10-CM

## 2021-06-03 DIAGNOSIS — M79.18 MYOFASCIAL PAIN: ICD-10-CM

## 2021-06-03 DIAGNOSIS — D50.9 IRON DEFICIENCY ANEMIA, UNSPECIFIED IRON DEFICIENCY ANEMIA TYPE: ICD-10-CM

## 2021-06-03 DIAGNOSIS — M50.30 DDD (DEGENERATIVE DISC DISEASE), CERVICAL: ICD-10-CM

## 2021-06-03 DIAGNOSIS — M34.89 CUTANEOUS SCLERODERMA: ICD-10-CM

## 2021-06-03 PROCEDURE — 99214 OFFICE O/P EST MOD 30 MIN: CPT | Mod: PBBFAC | Performed by: NURSE PRACTITIONER

## 2021-06-03 PROCEDURE — 99211 OFF/OP EST MAY X REQ PHY/QHP: CPT | Mod: PBBFAC,27

## 2021-06-03 PROCEDURE — 99999 PR PBB SHADOW E&M-EST. PATIENT-LVL I: ICD-10-PCS | Mod: PBBFAC,,,

## 2021-06-03 PROCEDURE — 99999 PR PBB SHADOW E&M-EST. PATIENT-LVL IV: ICD-10-PCS | Mod: PBBFAC,,, | Performed by: NURSE PRACTITIONER

## 2021-06-03 PROCEDURE — 99999 PR PBB SHADOW E&M-EST. PATIENT-LVL IV: CPT | Mod: PBBFAC,,, | Performed by: NURSE PRACTITIONER

## 2021-06-03 PROCEDURE — 99213 OFFICE O/P EST LOW 20 MIN: CPT | Mod: S$PBB,,, | Performed by: NURSE PRACTITIONER

## 2021-06-03 PROCEDURE — 99999 PR PBB SHADOW E&M-EST. PATIENT-LVL I: CPT | Mod: PBBFAC,,,

## 2021-06-03 PROCEDURE — 99213 PR OFFICE/OUTPT VISIT, EST, LEVL III, 20-29 MIN: ICD-10-PCS | Mod: S$PBB,,, | Performed by: NURSE PRACTITIONER

## 2021-06-09 ENCOUNTER — TELEPHONE (OUTPATIENT)
Dept: PULMONOLOGY | Facility: CLINIC | Age: 71
End: 2021-06-09

## 2021-06-10 ENCOUNTER — PES CALL (OUTPATIENT)
Dept: ADMINISTRATIVE | Facility: CLINIC | Age: 71
End: 2021-06-10

## 2021-07-02 ENCOUNTER — PATIENT MESSAGE (OUTPATIENT)
Dept: RHEUMATOLOGY | Facility: CLINIC | Age: 71
End: 2021-07-02

## 2021-07-02 ENCOUNTER — PATIENT MESSAGE (OUTPATIENT)
Dept: GASTROENTEROLOGY | Facility: CLINIC | Age: 71
End: 2021-07-02

## 2021-07-06 ENCOUNTER — OFFICE VISIT (OUTPATIENT)
Dept: PULMONOLOGY | Facility: CLINIC | Age: 71
End: 2021-07-06
Payer: MEDICARE

## 2021-07-06 ENCOUNTER — CLINICAL SUPPORT (OUTPATIENT)
Dept: GASTROENTEROLOGY | Facility: CLINIC | Age: 71
End: 2021-07-06

## 2021-07-06 ENCOUNTER — TELEPHONE (OUTPATIENT)
Dept: GASTROENTEROLOGY | Facility: CLINIC | Age: 71
End: 2021-07-06

## 2021-07-06 ENCOUNTER — HOSPITAL ENCOUNTER (OUTPATIENT)
Dept: PULMONOLOGY | Facility: CLINIC | Age: 71
Discharge: HOME | End: 2021-07-06
Payer: MEDICARE

## 2021-07-06 ENCOUNTER — TELEPHONE (OUTPATIENT)
Dept: RHEUMATOLOGY | Facility: CLINIC | Age: 71
End: 2021-07-06

## 2021-07-06 VITALS — HEIGHT: 65 IN | BODY MASS INDEX: 26.33 KG/M2 | WEIGHT: 158 LBS

## 2021-07-06 VITALS
DIASTOLIC BLOOD PRESSURE: 58 MMHG | HEIGHT: 65 IN | SYSTOLIC BLOOD PRESSURE: 122 MMHG | WEIGHT: 157.44 LBS | BODY MASS INDEX: 26.23 KG/M2

## 2021-07-06 DIAGNOSIS — J84.9 ILD (INTERSTITIAL LUNG DISEASE): Primary | ICD-10-CM

## 2021-07-06 DIAGNOSIS — J84.9 ILD (INTERSTITIAL LUNG DISEASE): ICD-10-CM

## 2021-07-06 DIAGNOSIS — R77.8 ELEVATED TOTAL PROTEIN: Primary | ICD-10-CM

## 2021-07-06 PROCEDURE — 99214 PR OFFICE/OUTPT VISIT, EST, LEVL IV, 30-39 MIN: ICD-10-PCS | Mod: S$PBB,,, | Performed by: INTERNAL MEDICINE

## 2021-07-06 PROCEDURE — 94618 PULMONARY STRESS TESTING: CPT | Mod: 26,S$PBB,, | Performed by: INTERNAL MEDICINE

## 2021-07-06 PROCEDURE — 99999 PR PBB SHADOW E&M-EST. PATIENT-LVL III: ICD-10-PCS | Mod: PBBFAC,,, | Performed by: INTERNAL MEDICINE

## 2021-07-06 PROCEDURE — 99213 OFFICE O/P EST LOW 20 MIN: CPT | Mod: PBBFAC | Performed by: INTERNAL MEDICINE

## 2021-07-06 PROCEDURE — 94618 PULMONARY STRESS TESTING: CPT | Mod: PBBFAC | Performed by: INTERNAL MEDICINE

## 2021-07-06 PROCEDURE — 99214 OFFICE O/P EST MOD 30 MIN: CPT | Mod: S$PBB,,, | Performed by: INTERNAL MEDICINE

## 2021-07-06 PROCEDURE — 99999 PR PBB SHADOW E&M-EST. PATIENT-LVL III: CPT | Mod: PBBFAC,,, | Performed by: INTERNAL MEDICINE

## 2021-07-06 PROCEDURE — 94618 PULMONARY STRESS TESTING: ICD-10-PCS | Mod: 26,S$PBB,, | Performed by: INTERNAL MEDICINE

## 2021-07-10 ENCOUNTER — PATIENT MESSAGE (OUTPATIENT)
Dept: PHARMACY | Facility: CLINIC | Age: 71
End: 2021-07-10

## 2021-07-13 ENCOUNTER — SPECIALTY PHARMACY (OUTPATIENT)
Dept: PHARMACY | Facility: CLINIC | Age: 71
End: 2021-07-13

## 2021-07-26 ENCOUNTER — OFFICE VISIT (OUTPATIENT)
Dept: WOUND CARE | Facility: CLINIC | Age: 71
End: 2021-07-26
Payer: MEDICARE

## 2021-07-26 ENCOUNTER — LAB VISIT (OUTPATIENT)
Dept: LAB | Facility: HOSPITAL | Age: 71
End: 2021-07-26
Attending: INTERNAL MEDICINE
Payer: MEDICARE

## 2021-07-26 VITALS
HEART RATE: 66 BPM | SYSTOLIC BLOOD PRESSURE: 114 MMHG | BODY MASS INDEX: 26.19 KG/M2 | HEIGHT: 65 IN | RESPIRATION RATE: 20 BRPM | DIASTOLIC BLOOD PRESSURE: 59 MMHG | WEIGHT: 157.19 LBS

## 2021-07-26 DIAGNOSIS — R06.82 TACHYPNEA: ICD-10-CM

## 2021-07-26 DIAGNOSIS — M34.89 CUTANEOUS SCLERODERMA: ICD-10-CM

## 2021-07-26 DIAGNOSIS — L97.519 SKIN ULCERS OF BOTH FEET: Primary | ICD-10-CM

## 2021-07-26 DIAGNOSIS — L97.529 SKIN ULCERS OF BOTH FEET: Primary | ICD-10-CM

## 2021-07-26 DIAGNOSIS — Z79.899 POLYPHARMACY: ICD-10-CM

## 2021-07-26 DIAGNOSIS — R77.8 ELEVATED TOTAL PROTEIN: ICD-10-CM

## 2021-07-26 LAB
ALBUMIN SERPL BCP-MCNC: 3.4 G/DL (ref 3.5–5.2)
ALP SERPL-CCNC: 104 U/L (ref 55–135)
ALT SERPL W/O P-5'-P-CCNC: 12 U/L (ref 10–44)
ANION GAP SERPL CALC-SCNC: 12 MMOL/L (ref 8–16)
AST SERPL-CCNC: 18 U/L (ref 10–40)
BASOPHILS # BLD AUTO: 0.02 K/UL (ref 0–0.2)
BASOPHILS NFR BLD: 0.5 % (ref 0–1.9)
BILIRUB SERPL-MCNC: 0.3 MG/DL (ref 0.1–1)
BNP SERPL-MCNC: 106 PG/ML (ref 0–99)
BUN SERPL-MCNC: 11 MG/DL (ref 8–23)
CALCIUM SERPL-MCNC: 9.6 MG/DL (ref 8.7–10.5)
CHLORIDE SERPL-SCNC: 110 MMOL/L (ref 95–110)
CO2 SERPL-SCNC: 22 MMOL/L (ref 23–29)
CREAT SERPL-MCNC: 0.7 MG/DL (ref 0.5–1.4)
DIFFERENTIAL METHOD: ABNORMAL
EOSINOPHIL # BLD AUTO: 0 K/UL (ref 0–0.5)
EOSINOPHIL NFR BLD: 0.9 % (ref 0–8)
ERYTHROCYTE [DISTWIDTH] IN BLOOD BY AUTOMATED COUNT: 16.1 % (ref 11.5–14.5)
EST. GFR  (AFRICAN AMERICAN): >60 ML/MIN/1.73 M^2
EST. GFR  (NON AFRICAN AMERICAN): >60 ML/MIN/1.73 M^2
GLUCOSE SERPL-MCNC: 136 MG/DL (ref 70–110)
HCT VFR BLD AUTO: 37.4 % (ref 37–48.5)
HGB BLD-MCNC: 12.2 G/DL (ref 12–16)
IGA SERPL-MCNC: 433 MG/DL (ref 40–350)
IGG SERPL-MCNC: 2059 MG/DL (ref 650–1600)
IGM SERPL-MCNC: 90 MG/DL (ref 50–300)
IMM GRANULOCYTES # BLD AUTO: 0.01 K/UL (ref 0–0.04)
IMM GRANULOCYTES NFR BLD AUTO: 0.2 % (ref 0–0.5)
LYMPHOCYTES # BLD AUTO: 1.3 K/UL (ref 1–4.8)
LYMPHOCYTES NFR BLD: 28.9 % (ref 18–48)
MAGNESIUM SERPL-MCNC: 1.9 MG/DL (ref 1.6–2.6)
MCH RBC QN AUTO: 29.8 PG (ref 27–31)
MCHC RBC AUTO-ENTMCNC: 32.6 G/DL (ref 32–36)
MCV RBC AUTO: 91 FL (ref 82–98)
MONOCYTES # BLD AUTO: 0.3 K/UL (ref 0.3–1)
MONOCYTES NFR BLD: 6.3 % (ref 4–15)
NEUTROPHILS # BLD AUTO: 2.7 K/UL (ref 1.8–7.7)
NEUTROPHILS NFR BLD: 63.2 % (ref 38–73)
NRBC BLD-RTO: 0 /100 WBC
PLATELET # BLD AUTO: 196 K/UL (ref 150–450)
PMV BLD AUTO: 11.9 FL (ref 9.2–12.9)
POTASSIUM SERPL-SCNC: 3.4 MMOL/L (ref 3.5–5.1)
PROT SERPL-MCNC: 8.2 G/DL (ref 6–8.4)
RBC # BLD AUTO: 4.1 M/UL (ref 4–5.4)
SODIUM SERPL-SCNC: 144 MMOL/L (ref 136–145)
WBC # BLD AUTO: 4.32 K/UL (ref 3.9–12.7)

## 2021-07-26 PROCEDURE — 99214 PR OFFICE/OUTPT VISIT, EST, LEVL IV, 30-39 MIN: ICD-10-PCS | Mod: S$PBB,,, | Performed by: NURSE PRACTITIONER

## 2021-07-26 PROCEDURE — 84165 PROTEIN E-PHORESIS SERUM: CPT | Performed by: INTERNAL MEDICINE

## 2021-07-26 PROCEDURE — 99214 OFFICE O/P EST MOD 30 MIN: CPT | Mod: S$PBB,,, | Performed by: NURSE PRACTITIONER

## 2021-07-26 PROCEDURE — 80053 COMPREHEN METABOLIC PANEL: CPT | Performed by: INTERNAL MEDICINE

## 2021-07-26 PROCEDURE — 99215 OFFICE O/P EST HI 40 MIN: CPT | Mod: PBBFAC | Performed by: NURSE PRACTITIONER

## 2021-07-26 PROCEDURE — 99999 PR PBB SHADOW E&M-EST. PATIENT-LVL V: CPT | Mod: PBBFAC,,, | Performed by: NURSE PRACTITIONER

## 2021-07-26 PROCEDURE — 83735 ASSAY OF MAGNESIUM: CPT | Performed by: INTERNAL MEDICINE

## 2021-07-26 PROCEDURE — 82784 ASSAY IGA/IGD/IGG/IGM EACH: CPT | Mod: 59 | Performed by: INTERNAL MEDICINE

## 2021-07-26 PROCEDURE — 36415 COLL VENOUS BLD VENIPUNCTURE: CPT | Performed by: INTERNAL MEDICINE

## 2021-07-26 PROCEDURE — 84165 PATHOLOGIST INTERPRETATION SPE: ICD-10-PCS | Mod: 26,,, | Performed by: PATHOLOGY

## 2021-07-26 PROCEDURE — 86334 PATHOLOGIST INTERPRETATION IFE: ICD-10-PCS | Mod: 26,,, | Performed by: PATHOLOGY

## 2021-07-26 PROCEDURE — 86334 IMMUNOFIX E-PHORESIS SERUM: CPT | Performed by: INTERNAL MEDICINE

## 2021-07-26 PROCEDURE — 99999 PR PBB SHADOW E&M-EST. PATIENT-LVL V: ICD-10-PCS | Mod: PBBFAC,,, | Performed by: NURSE PRACTITIONER

## 2021-07-26 PROCEDURE — 84165 PROTEIN E-PHORESIS SERUM: CPT | Mod: 26,,, | Performed by: PATHOLOGY

## 2021-07-26 PROCEDURE — 83880 ASSAY OF NATRIURETIC PEPTIDE: CPT | Performed by: INTERNAL MEDICINE

## 2021-07-26 PROCEDURE — 85025 COMPLETE CBC W/AUTO DIFF WBC: CPT | Performed by: INTERNAL MEDICINE

## 2021-07-26 PROCEDURE — 86334 IMMUNOFIX E-PHORESIS SERUM: CPT | Mod: 26,,, | Performed by: PATHOLOGY

## 2021-07-27 ENCOUNTER — OFFICE VISIT (OUTPATIENT)
Dept: INTERNAL MEDICINE | Facility: CLINIC | Age: 71
End: 2021-07-27
Payer: MEDICARE

## 2021-07-27 ENCOUNTER — HOSPITAL ENCOUNTER (OUTPATIENT)
Dept: RADIOLOGY | Facility: HOSPITAL | Age: 71
Discharge: HOME | End: 2021-07-27
Attending: INTERNAL MEDICINE
Payer: MEDICARE

## 2021-07-27 VITALS
RESPIRATION RATE: 16 BRPM | WEIGHT: 156.5 LBS | DIASTOLIC BLOOD PRESSURE: 48 MMHG | SYSTOLIC BLOOD PRESSURE: 116 MMHG | TEMPERATURE: 98 F | BODY MASS INDEX: 26.08 KG/M2 | HEIGHT: 65 IN | HEART RATE: 64 BPM

## 2021-07-27 DIAGNOSIS — M34.9 SCLERODERMA: ICD-10-CM

## 2021-07-27 DIAGNOSIS — M25.561 ACUTE PAIN OF RIGHT KNEE: Primary | ICD-10-CM

## 2021-07-27 DIAGNOSIS — I10 ESSENTIAL HYPERTENSION: ICD-10-CM

## 2021-07-27 DIAGNOSIS — M25.561 ACUTE PAIN OF RIGHT KNEE: ICD-10-CM

## 2021-07-27 LAB
ALBUMIN SERPL ELPH-MCNC: 3.74 G/DL (ref 3.35–5.55)
ALPHA1 GLOB SERPL ELPH-MCNC: 0.36 G/DL (ref 0.17–0.41)
ALPHA2 GLOB SERPL ELPH-MCNC: 0.76 G/DL (ref 0.43–0.99)
B-GLOBULIN SERPL ELPH-MCNC: 1.06 G/DL (ref 0.5–1.1)
GAMMA GLOB SERPL ELPH-MCNC: 2.09 G/DL (ref 0.67–1.58)
INTERPRETATION SERPL IFE-IMP: NORMAL
PROT SERPL-MCNC: 8 G/DL (ref 6–8.4)

## 2021-07-27 PROCEDURE — 99999 PR PBB SHADOW E&M-EST. PATIENT-LVL V: ICD-10-PCS | Mod: PBBFAC,,, | Performed by: INTERNAL MEDICINE

## 2021-07-27 PROCEDURE — 99215 OFFICE O/P EST HI 40 MIN: CPT | Mod: PBBFAC,PO | Performed by: INTERNAL MEDICINE

## 2021-07-27 PROCEDURE — 99213 OFFICE O/P EST LOW 20 MIN: CPT | Mod: S$PBB,,, | Performed by: INTERNAL MEDICINE

## 2021-07-27 PROCEDURE — 96372 THER/PROPH/DIAG INJ SC/IM: CPT | Mod: PBBFAC,PO

## 2021-07-27 PROCEDURE — 99213 PR OFFICE/OUTPT VISIT, EST, LEVL III, 20-29 MIN: ICD-10-PCS | Mod: S$PBB,,, | Performed by: INTERNAL MEDICINE

## 2021-07-27 PROCEDURE — 99999 PR PBB SHADOW E&M-EST. PATIENT-LVL V: CPT | Mod: PBBFAC,,, | Performed by: INTERNAL MEDICINE

## 2021-07-27 PROCEDURE — 73562 X-RAY EXAM OF KNEE 3: CPT | Mod: TC,PO,RT

## 2021-07-27 PROCEDURE — 73562 X-RAY EXAM OF KNEE 3: CPT | Mod: 26,RT,, | Performed by: RADIOLOGY

## 2021-07-27 PROCEDURE — 73562 XR KNEE 3 VIEW RIGHT: ICD-10-PCS | Mod: 26,RT,, | Performed by: RADIOLOGY

## 2021-07-27 RX ORDER — KETOROLAC TROMETHAMINE 30 MG/ML
30 INJECTION, SOLUTION INTRAMUSCULAR; INTRAVENOUS
Status: COMPLETED | OUTPATIENT
Start: 2021-07-27 | End: 2021-07-27

## 2021-07-27 RX ORDER — NABUMETONE 750 MG/1
750 TABLET, FILM COATED ORAL 2 TIMES DAILY PRN
Qty: 60 TABLET | Refills: 3 | Status: SHIPPED | OUTPATIENT
Start: 2021-07-27 | End: 2021-12-08 | Stop reason: SDUPTHER

## 2021-07-27 RX ADMIN — KETOROLAC TROMETHAMINE 30 MG: 30 INJECTION, SOLUTION INTRAMUSCULAR; INTRAVENOUS at 10:07

## 2021-07-28 ENCOUNTER — PATIENT MESSAGE (OUTPATIENT)
Dept: INTERNAL MEDICINE | Facility: CLINIC | Age: 71
End: 2021-07-28

## 2021-07-28 ENCOUNTER — PATIENT MESSAGE (OUTPATIENT)
Dept: RHEUMATOLOGY | Facility: CLINIC | Age: 71
End: 2021-07-28

## 2021-07-28 ENCOUNTER — TELEPHONE (OUTPATIENT)
Dept: RHEUMATOLOGY | Facility: CLINIC | Age: 71
End: 2021-07-28

## 2021-07-28 LAB
PATHOLOGIST INTERPRETATION IFE: NORMAL
PATHOLOGIST INTERPRETATION SPE: NORMAL

## 2021-08-06 ENCOUNTER — TELEPHONE (OUTPATIENT)
Dept: ADMINISTRATIVE | Facility: CLINIC | Age: 71
End: 2021-08-06

## 2021-08-09 ENCOUNTER — OFFICE VISIT (OUTPATIENT)
Dept: HOME HEALTH SERVICES | Facility: CLINIC | Age: 71
End: 2021-08-09
Payer: MEDICARE

## 2021-08-09 VITALS — WEIGHT: 158 LBS | BODY MASS INDEX: 26.33 KG/M2 | HEIGHT: 65 IN

## 2021-08-09 DIAGNOSIS — G89.29 OTHER CHRONIC PAIN: Chronic | ICD-10-CM

## 2021-08-09 DIAGNOSIS — I73.00 RAYNAUD'S DISEASE WITHOUT GANGRENE: ICD-10-CM

## 2021-08-09 DIAGNOSIS — I87.2 VENOUS INSUFFICIENCY OF BOTH LOWER EXTREMITIES: ICD-10-CM

## 2021-08-09 DIAGNOSIS — K21.9 GASTROESOPHAGEAL REFLUX DISEASE, UNSPECIFIED WHETHER ESOPHAGITIS PRESENT: ICD-10-CM

## 2021-08-09 DIAGNOSIS — Z00.00 ENCOUNTER FOR PREVENTIVE HEALTH EXAMINATION: Primary | ICD-10-CM

## 2021-08-09 DIAGNOSIS — I27.20 PULMONARY HYPERTENSION: ICD-10-CM

## 2021-08-09 DIAGNOSIS — R32 URINARY INCONTINENCE, UNSPECIFIED TYPE: ICD-10-CM

## 2021-08-09 DIAGNOSIS — L97.519 SKIN ULCERS OF BOTH FEET: ICD-10-CM

## 2021-08-09 DIAGNOSIS — J84.9 ILD (INTERSTITIAL LUNG DISEASE): ICD-10-CM

## 2021-08-09 DIAGNOSIS — I27.29 PULMONARY HYPERTENSION ASSOCIATED WITH SYSTEMIC DISORDER: Chronic | ICD-10-CM

## 2021-08-09 DIAGNOSIS — I10 ESSENTIAL HYPERTENSION: ICD-10-CM

## 2021-08-09 DIAGNOSIS — I77.1 ARTERIAL INSUFFICIENCY WITH ISCHEMIC ULCER: ICD-10-CM

## 2021-08-09 DIAGNOSIS — L97.529 SKIN ULCERS OF BOTH FEET: ICD-10-CM

## 2021-08-09 DIAGNOSIS — L98.499 ARTERIAL INSUFFICIENCY WITH ISCHEMIC ULCER: ICD-10-CM

## 2021-08-09 DIAGNOSIS — M34.89 CUTANEOUS SCLERODERMA: ICD-10-CM

## 2021-08-09 DIAGNOSIS — M85.89 OSTEOPENIA OF MULTIPLE SITES: ICD-10-CM

## 2021-08-09 DIAGNOSIS — G60.9 IDIOPATHIC NEUROPATHY: ICD-10-CM

## 2021-08-09 PROCEDURE — G0439 PR MEDICARE ANNUAL WELLNESS SUBSEQUENT VISIT: ICD-10-PCS | Mod: 95,,, | Performed by: NURSE PRACTITIONER

## 2021-08-09 PROCEDURE — G0439 PPPS, SUBSEQ VISIT: HCPCS | Mod: 95,,, | Performed by: NURSE PRACTITIONER

## 2021-08-27 ENCOUNTER — OFFICE VISIT (OUTPATIENT)
Dept: PRIMARY CARE CLINIC | Facility: CLINIC | Age: 71
End: 2021-08-27
Payer: MEDICARE

## 2021-08-27 ENCOUNTER — HOSPITAL ENCOUNTER (OUTPATIENT)
Dept: RADIOLOGY | Facility: HOSPITAL | Age: 71
Discharge: HOME | End: 2021-08-27
Attending: INTERNAL MEDICINE
Payer: MEDICARE

## 2021-08-27 VITALS
TEMPERATURE: 98 F | SYSTOLIC BLOOD PRESSURE: 140 MMHG | BODY MASS INDEX: 25.42 KG/M2 | WEIGHT: 152.56 LBS | HEART RATE: 68 BPM | HEIGHT: 65 IN | DIASTOLIC BLOOD PRESSURE: 60 MMHG

## 2021-08-27 DIAGNOSIS — R05.9 COUGH: ICD-10-CM

## 2021-08-27 DIAGNOSIS — R05.9 COUGH: Primary | ICD-10-CM

## 2021-08-27 PROCEDURE — 99999 PR PBB SHADOW E&M-EST. PATIENT-LVL IV: ICD-10-PCS | Mod: PBBFAC,,, | Performed by: INTERNAL MEDICINE

## 2021-08-27 PROCEDURE — 99214 OFFICE O/P EST MOD 30 MIN: CPT | Mod: PBBFAC,PN | Performed by: INTERNAL MEDICINE

## 2021-08-27 PROCEDURE — 71046 X-RAY EXAM CHEST 2 VIEWS: CPT | Mod: 26,,, | Performed by: RADIOLOGY

## 2021-08-27 PROCEDURE — 71046 X-RAY EXAM CHEST 2 VIEWS: CPT | Mod: TC,PN

## 2021-08-27 PROCEDURE — 99999 PR PBB SHADOW E&M-EST. PATIENT-LVL IV: CPT | Mod: PBBFAC,,, | Performed by: INTERNAL MEDICINE

## 2021-08-27 PROCEDURE — 71046 XR CHEST PA AND LATERAL: ICD-10-PCS | Mod: 26,,, | Performed by: RADIOLOGY

## 2021-08-27 PROCEDURE — 99214 OFFICE O/P EST MOD 30 MIN: CPT | Mod: S$PBB,,, | Performed by: INTERNAL MEDICINE

## 2021-08-27 PROCEDURE — 99214 PR OFFICE/OUTPT VISIT, EST, LEVL IV, 30-39 MIN: ICD-10-PCS | Mod: S$PBB,,, | Performed by: INTERNAL MEDICINE

## 2021-08-27 RX ORDER — AMOXICILLIN AND CLAVULANATE POTASSIUM 250; 62.5 MG/5ML; MG/5ML
10 POWDER, FOR SUSPENSION ORAL 2 TIMES DAILY
Qty: 140 ML | Refills: 0 | Status: SHIPPED | OUTPATIENT
Start: 2021-08-27 | End: 2021-09-03

## 2021-08-27 RX ORDER — BENZONATATE 100 MG/1
100 CAPSULE ORAL 3 TIMES DAILY
COMMUNITY
Start: 2021-08-11 | End: 2022-01-24

## 2021-08-27 RX ORDER — AMOXICILLIN AND CLAVULANATE POTASSIUM 875; 125 MG/1; MG/1
1 TABLET, FILM COATED ORAL 2 TIMES DAILY
Qty: 14 TABLET | Refills: 0 | Status: SHIPPED | OUTPATIENT
Start: 2021-08-27 | End: 2021-08-27

## 2021-09-05 ENCOUNTER — SPECIALTY PHARMACY (OUTPATIENT)
Dept: PHARMACY | Facility: CLINIC | Age: 71
End: 2021-09-05

## 2021-09-13 ENCOUNTER — SPECIALTY PHARMACY (OUTPATIENT)
Dept: PHARMACY | Facility: CLINIC | Age: 71
End: 2021-09-13

## 2021-09-17 DIAGNOSIS — M34.9 SCLERODERMA: ICD-10-CM

## 2021-09-17 DIAGNOSIS — E78.5 HYPERLIPIDEMIA: ICD-10-CM

## 2021-09-17 RX ORDER — PRAVASTATIN SODIUM 20 MG/1
20 TABLET ORAL NIGHTLY
Qty: 90 TABLET | Refills: 1 | Status: SHIPPED | OUTPATIENT
Start: 2021-09-17 | End: 2021-12-21

## 2021-09-21 ENCOUNTER — IMMUNIZATION (OUTPATIENT)
Dept: PHARMACY | Facility: CLINIC | Age: 71
End: 2021-09-21
Payer: MEDICARE

## 2021-09-21 DIAGNOSIS — Z23 NEED FOR VACCINATION: Primary | ICD-10-CM

## 2021-09-22 ENCOUNTER — TELEPHONE (OUTPATIENT)
Dept: PHARMACY | Facility: CLINIC | Age: 71
End: 2021-09-22

## 2021-09-27 ENCOUNTER — PATIENT OUTREACH (OUTPATIENT)
Dept: ADMINISTRATIVE | Facility: OTHER | Age: 71
End: 2021-09-27

## 2021-09-30 ENCOUNTER — OFFICE VISIT (OUTPATIENT)
Dept: GASTROENTEROLOGY | Facility: CLINIC | Age: 71
End: 2021-09-30
Payer: MEDICARE

## 2021-09-30 ENCOUNTER — PATIENT MESSAGE (OUTPATIENT)
Dept: GASTROENTEROLOGY | Facility: CLINIC | Age: 71
End: 2021-09-30

## 2021-09-30 DIAGNOSIS — K21.9 GASTROESOPHAGEAL REFLUX DISEASE, UNSPECIFIED WHETHER ESOPHAGITIS PRESENT: Primary | ICD-10-CM

## 2021-09-30 DIAGNOSIS — R13.10 DYSPHAGIA, UNSPECIFIED TYPE: ICD-10-CM

## 2021-09-30 DIAGNOSIS — M34.9 SCLERODERMA: ICD-10-CM

## 2021-09-30 DIAGNOSIS — R10.9 ABDOMINAL PAIN, UNSPECIFIED ABDOMINAL LOCATION: ICD-10-CM

## 2021-09-30 DIAGNOSIS — R68.81 EARLY SATIETY: ICD-10-CM

## 2021-09-30 DIAGNOSIS — K59.00 CONSTIPATION, UNSPECIFIED CONSTIPATION TYPE: ICD-10-CM

## 2021-09-30 DIAGNOSIS — R15.9 INCONTINENCE OF FECES, UNSPECIFIED FECAL INCONTINENCE TYPE: ICD-10-CM

## 2021-09-30 PROCEDURE — 99213 OFFICE O/P EST LOW 20 MIN: CPT | Mod: 95,,, | Performed by: INTERNAL MEDICINE

## 2021-09-30 PROCEDURE — 99213 PR OFFICE/OUTPT VISIT, EST, LEVL III, 20-29 MIN: ICD-10-PCS | Mod: 95,,, | Performed by: INTERNAL MEDICINE

## 2021-10-04 ENCOUNTER — PATIENT MESSAGE (OUTPATIENT)
Dept: ADMINISTRATIVE | Facility: OTHER | Age: 71
End: 2021-10-04

## 2021-10-06 ENCOUNTER — OFFICE VISIT (OUTPATIENT)
Dept: PAIN MEDICINE | Facility: CLINIC | Age: 71
End: 2021-10-06
Payer: MEDICARE

## 2021-10-06 ENCOUNTER — LAB VISIT (OUTPATIENT)
Dept: LAB | Facility: OTHER | Age: 71
End: 2021-10-06
Attending: STUDENT IN AN ORGANIZED HEALTH CARE EDUCATION/TRAINING PROGRAM
Payer: MEDICARE

## 2021-10-06 VITALS
DIASTOLIC BLOOD PRESSURE: 54 MMHG | HEART RATE: 69 BPM | TEMPERATURE: 98 F | WEIGHT: 152.56 LBS | BODY MASS INDEX: 25.42 KG/M2 | RESPIRATION RATE: 19 BRPM | HEIGHT: 65 IN | SYSTOLIC BLOOD PRESSURE: 136 MMHG

## 2021-10-06 DIAGNOSIS — M50.30 DDD (DEGENERATIVE DISC DISEASE), CERVICAL: ICD-10-CM

## 2021-10-06 DIAGNOSIS — M47.812 CERVICAL SPONDYLOSIS: ICD-10-CM

## 2021-10-06 DIAGNOSIS — G60.9 IDIOPATHIC NEUROPATHY: ICD-10-CM

## 2021-10-06 DIAGNOSIS — M79.18 MYOFASCIAL PAIN: ICD-10-CM

## 2021-10-06 DIAGNOSIS — G89.4 CHRONIC PAIN DISORDER: Primary | ICD-10-CM

## 2021-10-06 DIAGNOSIS — M34.9 SCLERODERMA: ICD-10-CM

## 2021-10-06 LAB
ALBUMIN SERPL BCP-MCNC: 3.7 G/DL (ref 3.5–5.2)
ALP SERPL-CCNC: 119 U/L (ref 55–135)
ALT SERPL W/O P-5'-P-CCNC: 13 U/L (ref 10–44)
ANION GAP SERPL CALC-SCNC: 9 MMOL/L (ref 8–16)
AST SERPL-CCNC: 23 U/L (ref 10–40)
BASOPHILS # BLD AUTO: 0.04 K/UL (ref 0–0.2)
BASOPHILS NFR BLD: 0.8 % (ref 0–1.9)
BILIRUB SERPL-MCNC: 0.4 MG/DL (ref 0.1–1)
BUN SERPL-MCNC: 21 MG/DL (ref 8–23)
CALCIUM SERPL-MCNC: 9.2 MG/DL (ref 8.7–10.5)
CHLORIDE SERPL-SCNC: 106 MMOL/L (ref 95–110)
CO2 SERPL-SCNC: 24 MMOL/L (ref 23–29)
CREAT SERPL-MCNC: 0.8 MG/DL (ref 0.5–1.4)
CRP SERPL-MCNC: 18.5 MG/L (ref 0–8.2)
DIFFERENTIAL METHOD: ABNORMAL
EOSINOPHIL # BLD AUTO: 0.1 K/UL (ref 0–0.5)
EOSINOPHIL NFR BLD: 3 % (ref 0–8)
ERYTHROCYTE [DISTWIDTH] IN BLOOD BY AUTOMATED COUNT: 15.6 % (ref 11.5–14.5)
ERYTHROCYTE [SEDIMENTATION RATE] IN BLOOD: 80 MM/HR (ref 0–20)
EST. GFR  (AFRICAN AMERICAN): >60 ML/MIN/1.73 M^2
EST. GFR  (NON AFRICAN AMERICAN): >60 ML/MIN/1.73 M^2
GLUCOSE SERPL-MCNC: 89 MG/DL (ref 70–110)
HCT VFR BLD AUTO: 37.3 % (ref 37–48.5)
HGB BLD-MCNC: 12.4 G/DL (ref 12–16)
IMM GRANULOCYTES # BLD AUTO: 0.01 K/UL (ref 0–0.04)
IMM GRANULOCYTES NFR BLD AUTO: 0.2 % (ref 0–0.5)
LYMPHOCYTES # BLD AUTO: 1.5 K/UL (ref 1–4.8)
LYMPHOCYTES NFR BLD: 32.5 % (ref 18–48)
MCH RBC QN AUTO: 32 PG (ref 27–31)
MCHC RBC AUTO-ENTMCNC: 33.2 G/DL (ref 32–36)
MCV RBC AUTO: 96 FL (ref 82–98)
MONOCYTES # BLD AUTO: 0.4 K/UL (ref 0.3–1)
MONOCYTES NFR BLD: 8 % (ref 4–15)
NEUTROPHILS # BLD AUTO: 2.6 K/UL (ref 1.8–7.7)
NEUTROPHILS NFR BLD: 55.5 % (ref 38–73)
NRBC BLD-RTO: 0 /100 WBC
PLATELET # BLD AUTO: 190 K/UL (ref 150–450)
PMV BLD AUTO: 11.7 FL (ref 9.2–12.9)
POTASSIUM SERPL-SCNC: 4.3 MMOL/L (ref 3.5–5.1)
PROT SERPL-MCNC: 8.8 G/DL (ref 6–8.4)
RBC # BLD AUTO: 3.87 M/UL (ref 4–5.4)
SODIUM SERPL-SCNC: 139 MMOL/L (ref 136–145)
WBC # BLD AUTO: 4.74 K/UL (ref 3.9–12.7)

## 2021-10-06 PROCEDURE — 85651 RBC SED RATE NONAUTOMATED: CPT | Performed by: STUDENT IN AN ORGANIZED HEALTH CARE EDUCATION/TRAINING PROGRAM

## 2021-10-06 PROCEDURE — 36415 COLL VENOUS BLD VENIPUNCTURE: CPT | Performed by: STUDENT IN AN ORGANIZED HEALTH CARE EDUCATION/TRAINING PROGRAM

## 2021-10-06 PROCEDURE — 99214 PR OFFICE/OUTPT VISIT, EST, LEVL IV, 30-39 MIN: ICD-10-PCS | Mod: S$PBB,,, | Performed by: ANESTHESIOLOGY

## 2021-10-06 PROCEDURE — 80053 COMPREHEN METABOLIC PANEL: CPT | Performed by: STUDENT IN AN ORGANIZED HEALTH CARE EDUCATION/TRAINING PROGRAM

## 2021-10-06 PROCEDURE — 99213 OFFICE O/P EST LOW 20 MIN: CPT | Mod: PBBFAC | Performed by: ANESTHESIOLOGY

## 2021-10-06 PROCEDURE — 99214 OFFICE O/P EST MOD 30 MIN: CPT | Mod: S$PBB,,, | Performed by: ANESTHESIOLOGY

## 2021-10-06 PROCEDURE — 85025 COMPLETE CBC W/AUTO DIFF WBC: CPT | Performed by: STUDENT IN AN ORGANIZED HEALTH CARE EDUCATION/TRAINING PROGRAM

## 2021-10-06 PROCEDURE — 99999 PR PBB SHADOW E&M-EST. PATIENT-LVL III: ICD-10-PCS | Mod: PBBFAC,,, | Performed by: ANESTHESIOLOGY

## 2021-10-06 PROCEDURE — 99999 PR PBB SHADOW E&M-EST. PATIENT-LVL III: CPT | Mod: PBBFAC,,, | Performed by: ANESTHESIOLOGY

## 2021-10-06 PROCEDURE — 86140 C-REACTIVE PROTEIN: CPT | Performed by: STUDENT IN AN ORGANIZED HEALTH CARE EDUCATION/TRAINING PROGRAM

## 2021-10-06 RX ORDER — TRAMADOL HYDROCHLORIDE 50 MG/1
50 TABLET ORAL EVERY 8 HOURS PRN
Qty: 30 TABLET | Refills: 0 | Status: SHIPPED | OUTPATIENT
Start: 2021-10-06 | End: 2021-10-20

## 2021-10-12 ENCOUNTER — SPECIALTY PHARMACY (OUTPATIENT)
Dept: PHARMACY | Facility: CLINIC | Age: 71
End: 2021-10-12
Payer: MEDICARE

## 2021-10-13 ENCOUNTER — TELEPHONE (OUTPATIENT)
Dept: RHEUMATOLOGY | Facility: CLINIC | Age: 71
End: 2021-10-13

## 2021-10-15 ENCOUNTER — PATIENT MESSAGE (OUTPATIENT)
Dept: PAIN MEDICINE | Facility: CLINIC | Age: 71
End: 2021-10-15
Payer: MEDICARE

## 2021-10-18 ENCOUNTER — PATIENT MESSAGE (OUTPATIENT)
Dept: TRANSPLANT | Facility: CLINIC | Age: 71
End: 2021-10-18
Payer: MEDICARE

## 2021-10-20 ENCOUNTER — PATIENT MESSAGE (OUTPATIENT)
Dept: WOUND CARE | Facility: CLINIC | Age: 71
End: 2021-10-20

## 2021-10-20 ENCOUNTER — TELEPHONE (OUTPATIENT)
Dept: PAIN MEDICINE | Facility: CLINIC | Age: 71
End: 2021-10-20

## 2021-10-20 ENCOUNTER — OFFICE VISIT (OUTPATIENT)
Dept: PAIN MEDICINE | Facility: CLINIC | Age: 71
End: 2021-10-20
Payer: MEDICARE

## 2021-10-20 DIAGNOSIS — M79.18 MYOFASCIAL PAIN: ICD-10-CM

## 2021-10-20 DIAGNOSIS — G89.4 CHRONIC PAIN DISORDER: ICD-10-CM

## 2021-10-20 DIAGNOSIS — G89.4 CHRONIC PAIN DISORDER: Primary | ICD-10-CM

## 2021-10-20 DIAGNOSIS — M47.812 CERVICAL SPONDYLOSIS: ICD-10-CM

## 2021-10-20 DIAGNOSIS — M50.30 DDD (DEGENERATIVE DISC DISEASE), CERVICAL: ICD-10-CM

## 2021-10-20 DIAGNOSIS — G60.9 IDIOPATHIC NEUROPATHY: Primary | ICD-10-CM

## 2021-10-20 DIAGNOSIS — G60.9 IDIOPATHIC NEUROPATHY: ICD-10-CM

## 2021-10-20 DIAGNOSIS — M34.89 CUTANEOUS SCLERODERMA: ICD-10-CM

## 2021-10-20 PROCEDURE — 99213 PR OFFICE/OUTPT VISIT, EST, LEVL III, 20-29 MIN: ICD-10-PCS | Mod: 95,,, | Performed by: NURSE PRACTITIONER

## 2021-10-20 PROCEDURE — 99213 OFFICE O/P EST LOW 20 MIN: CPT | Mod: 95,,, | Performed by: NURSE PRACTITIONER

## 2021-10-20 RX ORDER — ACETAMINOPHEN AND CODEINE PHOSPHATE 300; 30 MG/1; MG/1
1 TABLET ORAL EVERY 8 HOURS PRN
Qty: 10 TABLET | Refills: 0 | Status: SHIPPED | OUTPATIENT
Start: 2021-10-20 | End: 2021-11-01 | Stop reason: SDUPTHER

## 2021-10-21 ENCOUNTER — LAB VISIT (OUTPATIENT)
Dept: LAB | Facility: HOSPITAL | Age: 71
End: 2021-10-21
Payer: MEDICARE

## 2021-10-21 DIAGNOSIS — M34.9 SCLERODERMA: ICD-10-CM

## 2021-10-21 LAB
ALBUMIN SERPL BCP-MCNC: 3.3 G/DL (ref 3.5–5.2)
ALP SERPL-CCNC: 104 U/L (ref 55–135)
ALT SERPL W/O P-5'-P-CCNC: 10 U/L (ref 10–44)
ANION GAP SERPL CALC-SCNC: 10 MMOL/L (ref 8–16)
AST SERPL-CCNC: 17 U/L (ref 10–40)
BASOPHILS # BLD AUTO: 0.03 K/UL (ref 0–0.2)
BASOPHILS NFR BLD: 0.8 % (ref 0–1.9)
BILIRUB SERPL-MCNC: 0.3 MG/DL (ref 0.1–1)
BUN SERPL-MCNC: 12 MG/DL (ref 8–23)
CALCIUM SERPL-MCNC: 8.6 MG/DL (ref 8.7–10.5)
CHLORIDE SERPL-SCNC: 108 MMOL/L (ref 95–110)
CO2 SERPL-SCNC: 23 MMOL/L (ref 23–29)
CREAT SERPL-MCNC: 0.7 MG/DL (ref 0.5–1.4)
CRP SERPL-MCNC: 13.6 MG/L (ref 0–8.2)
DIFFERENTIAL METHOD: ABNORMAL
EOSINOPHIL # BLD AUTO: 0 K/UL (ref 0–0.5)
EOSINOPHIL NFR BLD: 0.8 % (ref 0–8)
ERYTHROCYTE [DISTWIDTH] IN BLOOD BY AUTOMATED COUNT: 14.9 % (ref 11.5–14.5)
ERYTHROCYTE [SEDIMENTATION RATE] IN BLOOD BY WESTERGREN METHOD: 38 MM/HR (ref 0–36)
EST. GFR  (AFRICAN AMERICAN): >60 ML/MIN/1.73 M^2
EST. GFR  (NON AFRICAN AMERICAN): >60 ML/MIN/1.73 M^2
GLUCOSE SERPL-MCNC: 108 MG/DL (ref 70–110)
HCT VFR BLD AUTO: 38.1 % (ref 37–48.5)
HGB BLD-MCNC: 12.3 G/DL (ref 12–16)
IMM GRANULOCYTES # BLD AUTO: 0.02 K/UL (ref 0–0.04)
IMM GRANULOCYTES NFR BLD AUTO: 0.5 % (ref 0–0.5)
LYMPHOCYTES # BLD AUTO: 1.3 K/UL (ref 1–4.8)
LYMPHOCYTES NFR BLD: 33.1 % (ref 18–48)
MCH RBC QN AUTO: 31.8 PG (ref 27–31)
MCHC RBC AUTO-ENTMCNC: 32.3 G/DL (ref 32–36)
MCV RBC AUTO: 98 FL (ref 82–98)
MONOCYTES # BLD AUTO: 0.3 K/UL (ref 0.3–1)
MONOCYTES NFR BLD: 8.5 % (ref 4–15)
NEUTROPHILS # BLD AUTO: 2.3 K/UL (ref 1.8–7.7)
NEUTROPHILS NFR BLD: 56.3 % (ref 38–73)
NRBC BLD-RTO: 0 /100 WBC
PLATELET # BLD AUTO: 200 K/UL (ref 150–450)
PMV BLD AUTO: 11.6 FL (ref 9.2–12.9)
POTASSIUM SERPL-SCNC: 3.6 MMOL/L (ref 3.5–5.1)
PROT SERPL-MCNC: 7.6 G/DL (ref 6–8.4)
RBC # BLD AUTO: 3.87 M/UL (ref 4–5.4)
SODIUM SERPL-SCNC: 141 MMOL/L (ref 136–145)
WBC # BLD AUTO: 3.99 K/UL (ref 3.9–12.7)

## 2021-10-21 PROCEDURE — 86140 C-REACTIVE PROTEIN: CPT | Performed by: STUDENT IN AN ORGANIZED HEALTH CARE EDUCATION/TRAINING PROGRAM

## 2021-10-21 PROCEDURE — 85025 COMPLETE CBC W/AUTO DIFF WBC: CPT | Performed by: STUDENT IN AN ORGANIZED HEALTH CARE EDUCATION/TRAINING PROGRAM

## 2021-10-21 PROCEDURE — 80053 COMPREHEN METABOLIC PANEL: CPT | Performed by: STUDENT IN AN ORGANIZED HEALTH CARE EDUCATION/TRAINING PROGRAM

## 2021-10-21 PROCEDURE — 85652 RBC SED RATE AUTOMATED: CPT | Performed by: STUDENT IN AN ORGANIZED HEALTH CARE EDUCATION/TRAINING PROGRAM

## 2021-10-21 PROCEDURE — 36415 COLL VENOUS BLD VENIPUNCTURE: CPT | Performed by: STUDENT IN AN ORGANIZED HEALTH CARE EDUCATION/TRAINING PROGRAM

## 2021-10-26 ENCOUNTER — PATIENT MESSAGE (OUTPATIENT)
Dept: PHARMACY | Facility: CLINIC | Age: 71
End: 2021-10-26
Payer: MEDICARE

## 2021-10-26 DIAGNOSIS — M34.9 SCLERODERMA: ICD-10-CM

## 2021-10-26 RX ORDER — MYCOPHENOLATE MOFETIL 200 MG/ML
1000 POWDER, FOR SUSPENSION ORAL 2 TIMES DAILY
Qty: 480 ML | Refills: 1 | Status: SHIPPED | OUTPATIENT
Start: 2021-10-26 | End: 2022-03-12 | Stop reason: SDUPTHER

## 2021-10-26 RX ORDER — MYCOPHENOLATE MOFETIL 200 MG/ML
1000 POWDER, FOR SUSPENSION ORAL 2 TIMES DAILY
Qty: 480 ML | Refills: 1 | OUTPATIENT
Start: 2021-10-26 | End: 2021-10-26 | Stop reason: SDUPTHER

## 2021-10-27 ENCOUNTER — TELEPHONE (OUTPATIENT)
Dept: PODIATRY | Facility: CLINIC | Age: 71
End: 2021-10-27
Payer: MEDICARE

## 2021-10-27 ENCOUNTER — OFFICE VISIT (OUTPATIENT)
Dept: PODIATRY | Facility: CLINIC | Age: 71
End: 2021-10-27
Payer: MEDICARE

## 2021-10-27 ENCOUNTER — OFFICE VISIT (OUTPATIENT)
Dept: RHEUMATOLOGY | Facility: CLINIC | Age: 71
End: 2021-10-27
Payer: MEDICARE

## 2021-10-27 VITALS
BODY MASS INDEX: 26.23 KG/M2 | HEART RATE: 66 BPM | WEIGHT: 157.44 LBS | HEIGHT: 65 IN | SYSTOLIC BLOOD PRESSURE: 126 MMHG | DIASTOLIC BLOOD PRESSURE: 74 MMHG

## 2021-10-27 VITALS
SYSTOLIC BLOOD PRESSURE: 150 MMHG | HEIGHT: 65 IN | BODY MASS INDEX: 26.16 KG/M2 | RESPIRATION RATE: 18 BRPM | DIASTOLIC BLOOD PRESSURE: 66 MMHG | HEART RATE: 61 BPM | WEIGHT: 157 LBS

## 2021-10-27 DIAGNOSIS — J84.9 ILD (INTERSTITIAL LUNG DISEASE): ICD-10-CM

## 2021-10-27 DIAGNOSIS — M34.89 CUTANEOUS SCLERODERMA: ICD-10-CM

## 2021-10-27 DIAGNOSIS — I87.2 VENOUS INSUFFICIENCY OF BOTH LOWER EXTREMITIES: ICD-10-CM

## 2021-10-27 DIAGNOSIS — L97.519 SKIN ULCERS OF BOTH FEET: ICD-10-CM

## 2021-10-27 DIAGNOSIS — I87.2 VENOUS STASIS ULCER OF OTHER PART OF LEFT FOOT WITH FAT LAYER EXPOSED WITHOUT VARICOSE VEINS: ICD-10-CM

## 2021-10-27 DIAGNOSIS — M34.9 SCLERODERMA: ICD-10-CM

## 2021-10-27 DIAGNOSIS — L97.529 SKIN ULCERS OF BOTH FEET: ICD-10-CM

## 2021-10-27 DIAGNOSIS — K21.9 GASTROESOPHAGEAL REFLUX DISEASE, UNSPECIFIED WHETHER ESOPHAGITIS PRESENT: ICD-10-CM

## 2021-10-27 DIAGNOSIS — I27.20 PULMONARY HYPERTENSION: ICD-10-CM

## 2021-10-27 DIAGNOSIS — G60.9 IDIOPATHIC PERIPHERAL NEUROPATHY: Primary | ICD-10-CM

## 2021-10-27 DIAGNOSIS — L97.512 VENOUS STASIS ULCER OF OTHER PART OF RIGHT FOOT WITH FAT LAYER EXPOSED WITHOUT VARICOSE VEINS: ICD-10-CM

## 2021-10-27 DIAGNOSIS — L97.522 VENOUS STASIS ULCER OF OTHER PART OF LEFT FOOT WITH FAT LAYER EXPOSED WITHOUT VARICOSE VEINS: ICD-10-CM

## 2021-10-27 DIAGNOSIS — M34.9 SCLERODERMA: Primary | ICD-10-CM

## 2021-10-27 DIAGNOSIS — R70.0 SEDIMENTATION RATE ELEVATION: ICD-10-CM

## 2021-10-27 DIAGNOSIS — M85.80 OSTEOPENIA, UNSPECIFIED LOCATION: ICD-10-CM

## 2021-10-27 DIAGNOSIS — I87.2 VENOUS STASIS ULCER OF OTHER PART OF RIGHT FOOT WITH FAT LAYER EXPOSED WITHOUT VARICOSE VEINS: ICD-10-CM

## 2021-10-27 PROCEDURE — 99214 PR OFFICE/OUTPT VISIT, EST, LEVL IV, 30-39 MIN: ICD-10-PCS | Mod: S$PBB,GC,, | Performed by: STUDENT IN AN ORGANIZED HEALTH CARE EDUCATION/TRAINING PROGRAM

## 2021-10-27 PROCEDURE — 99215 OFFICE O/P EST HI 40 MIN: CPT | Mod: PBBFAC,25 | Performed by: PODIATRIST

## 2021-10-27 PROCEDURE — 99999 PR PBB SHADOW E&M-EST. PATIENT-LVL V: CPT | Mod: PBBFAC,,, | Performed by: PODIATRIST

## 2021-10-27 PROCEDURE — 99999 PR PBB SHADOW E&M-EST. PATIENT-LVL V: ICD-10-PCS | Mod: PBBFAC,,, | Performed by: PODIATRIST

## 2021-10-27 PROCEDURE — 99215 OFFICE O/P EST HI 40 MIN: CPT | Mod: PBBFAC,27 | Performed by: STUDENT IN AN ORGANIZED HEALTH CARE EDUCATION/TRAINING PROGRAM

## 2021-10-27 PROCEDURE — 99999 PR PBB SHADOW E&M-EST. PATIENT-LVL V: ICD-10-PCS | Mod: PBBFAC,GC,, | Performed by: STUDENT IN AN ORGANIZED HEALTH CARE EDUCATION/TRAINING PROGRAM

## 2021-10-27 PROCEDURE — 99999 PR PBB SHADOW E&M-EST. PATIENT-LVL V: CPT | Mod: PBBFAC,GC,, | Performed by: STUDENT IN AN ORGANIZED HEALTH CARE EDUCATION/TRAINING PROGRAM

## 2021-10-27 PROCEDURE — 99203 OFFICE O/P NEW LOW 30 MIN: CPT | Mod: S$PBB,,, | Performed by: PODIATRIST

## 2021-10-27 PROCEDURE — 99203 PR OFFICE/OUTPT VISIT, NEW, LEVL III, 30-44 MIN: ICD-10-PCS | Mod: S$PBB,,, | Performed by: PODIATRIST

## 2021-10-27 PROCEDURE — 99214 OFFICE O/P EST MOD 30 MIN: CPT | Mod: S$PBB,GC,, | Performed by: STUDENT IN AN ORGANIZED HEALTH CARE EDUCATION/TRAINING PROGRAM

## 2021-10-27 ASSESSMENT — ROUTINE ASSESSMENT OF PATIENT INDEX DATA (RAPID3)
FATIGUE SCORE: 5
PAIN SCORE: 8
PATIENT GLOBAL ASSESSMENT SCORE: 5
AM STIFFNESS SCORE: 0, NO
MDHAQ FUNCTION SCORE: 1.3
TOTAL RAPID3 SCORE: 5.78
PSYCHOLOGICAL DISTRESS SCORE: 3.3

## 2021-11-05 ENCOUNTER — TELEPHONE (OUTPATIENT)
Dept: PAIN MEDICINE | Facility: CLINIC | Age: 71
End: 2021-11-05
Payer: MEDICARE

## 2021-11-08 ENCOUNTER — OFFICE VISIT (OUTPATIENT)
Dept: PAIN MEDICINE | Facility: CLINIC | Age: 71
End: 2021-11-08
Payer: MEDICARE

## 2021-11-08 ENCOUNTER — PATIENT MESSAGE (OUTPATIENT)
Dept: INTERNAL MEDICINE | Facility: CLINIC | Age: 71
End: 2021-11-08
Payer: MEDICARE

## 2021-11-08 DIAGNOSIS — M47.812 CERVICAL SPONDYLOSIS: ICD-10-CM

## 2021-11-08 DIAGNOSIS — G89.4 CHRONIC PAIN DISORDER: Primary | ICD-10-CM

## 2021-11-08 DIAGNOSIS — G60.9 IDIOPATHIC NEUROPATHY: ICD-10-CM

## 2021-11-08 DIAGNOSIS — M50.30 DDD (DEGENERATIVE DISC DISEASE), CERVICAL: ICD-10-CM

## 2021-11-08 DIAGNOSIS — M34.89 CUTANEOUS SCLERODERMA: ICD-10-CM

## 2021-11-08 DIAGNOSIS — M79.18 MYOFASCIAL PAIN: ICD-10-CM

## 2021-11-08 PROCEDURE — 99213 OFFICE O/P EST LOW 20 MIN: CPT | Mod: 95,,, | Performed by: NURSE PRACTITIONER

## 2021-11-08 PROCEDURE — 99213 PR OFFICE/OUTPT VISIT, EST, LEVL III, 20-29 MIN: ICD-10-PCS | Mod: 95,,, | Performed by: NURSE PRACTITIONER

## 2021-11-10 ENCOUNTER — CLINICAL SUPPORT (OUTPATIENT)
Dept: REHABILITATION | Facility: HOSPITAL | Age: 71
End: 2021-11-10
Attending: INTERNAL MEDICINE
Payer: MEDICARE

## 2021-11-10 DIAGNOSIS — R15.9 FECAL INCONTINENCE WITH INCOMPLETE DEFECATION: ICD-10-CM

## 2021-11-10 DIAGNOSIS — M62.89 PELVIC FLOOR DYSFUNCTION: ICD-10-CM

## 2021-11-10 DIAGNOSIS — R15.0 FECAL INCONTINENCE WITH INCOMPLETE DEFECATION: ICD-10-CM

## 2021-11-10 PROCEDURE — 97110 THERAPEUTIC EXERCISES: CPT | Mod: PO

## 2021-11-10 PROCEDURE — 97162 PT EVAL MOD COMPLEX 30 MIN: CPT | Mod: PO

## 2021-11-12 ENCOUNTER — LAB VISIT (OUTPATIENT)
Dept: LAB | Facility: HOSPITAL | Age: 71
End: 2021-11-12
Payer: MEDICARE

## 2021-11-12 ENCOUNTER — OFFICE VISIT (OUTPATIENT)
Dept: TRANSPLANT | Facility: CLINIC | Age: 71
End: 2021-11-12
Payer: MEDICARE

## 2021-11-12 ENCOUNTER — HOSPITAL ENCOUNTER (OUTPATIENT)
Dept: WOUND CARE | Facility: HOSPITAL | Age: 71
Discharge: HOME | End: 2021-11-12
Attending: PREVENTIVE MEDICINE
Payer: MEDICARE

## 2021-11-12 VITALS
DIASTOLIC BLOOD PRESSURE: 63 MMHG | HEIGHT: 65 IN | WEIGHT: 149.94 LBS | TEMPERATURE: 98 F | BODY MASS INDEX: 24.98 KG/M2 | SYSTOLIC BLOOD PRESSURE: 141 MMHG | HEART RATE: 69 BPM

## 2021-11-12 VITALS
OXYGEN SATURATION: 99 % | HEART RATE: 64 BPM | BODY MASS INDEX: 25.56 KG/M2 | WEIGHT: 153.44 LBS | DIASTOLIC BLOOD PRESSURE: 68 MMHG | SYSTOLIC BLOOD PRESSURE: 153 MMHG | HEIGHT: 65 IN

## 2021-11-12 DIAGNOSIS — D64.9 ANEMIA, UNSPECIFIED TYPE: ICD-10-CM

## 2021-11-12 DIAGNOSIS — L97.521 ULCER OF LEFT FOOT, LIMITED TO BREAKDOWN OF SKIN: ICD-10-CM

## 2021-11-12 DIAGNOSIS — Z79.899 POLYPHARMACY: ICD-10-CM

## 2021-11-12 DIAGNOSIS — R06.82 TACHYPNEA: ICD-10-CM

## 2021-11-12 DIAGNOSIS — J84.9 ILD (INTERSTITIAL LUNG DISEASE): ICD-10-CM

## 2021-11-12 DIAGNOSIS — I87.2 VENOUS STASIS ULCER OF OTHER PART OF RIGHT FOOT WITH FAT LAYER EXPOSED WITHOUT VARICOSE VEINS: Primary | ICD-10-CM

## 2021-11-12 DIAGNOSIS — L97.529 SKIN ULCERS OF BOTH FEET: ICD-10-CM

## 2021-11-12 DIAGNOSIS — I10 ESSENTIAL HYPERTENSION: ICD-10-CM

## 2021-11-12 DIAGNOSIS — I87.2 VENOUS STASIS ULCER OF OTHER PART OF LEFT FOOT WITH FAT LAYER EXPOSED WITHOUT VARICOSE VEINS: ICD-10-CM

## 2021-11-12 DIAGNOSIS — L97.519 SKIN ULCERS OF BOTH FEET: ICD-10-CM

## 2021-11-12 DIAGNOSIS — M34.9 SCLERODERMA WITH PULMONARY INVOLVEMENT: ICD-10-CM

## 2021-11-12 DIAGNOSIS — I27.29 PULMONARY HYPERTENSION ASSOCIATED WITH SYSTEMIC DISORDER: Primary | ICD-10-CM

## 2021-11-12 DIAGNOSIS — L97.512 VENOUS STASIS ULCER OF OTHER PART OF RIGHT FOOT WITH FAT LAYER EXPOSED WITHOUT VARICOSE VEINS: Primary | ICD-10-CM

## 2021-11-12 DIAGNOSIS — L97.522 VENOUS STASIS ULCER OF OTHER PART OF LEFT FOOT WITH FAT LAYER EXPOSED WITHOUT VARICOSE VEINS: ICD-10-CM

## 2021-11-12 DIAGNOSIS — G60.9 IDIOPATHIC PERIPHERAL NEUROPATHY: ICD-10-CM

## 2021-11-12 DIAGNOSIS — I27.20 PULMONARY HYPERTENSION: ICD-10-CM

## 2021-11-12 LAB
ALBUMIN SERPL BCP-MCNC: 3.6 G/DL (ref 3.5–5.2)
ALP SERPL-CCNC: 114 U/L (ref 55–135)
ALT SERPL W/O P-5'-P-CCNC: 11 U/L (ref 10–44)
ANION GAP SERPL CALC-SCNC: 9 MMOL/L (ref 8–16)
AST SERPL-CCNC: 16 U/L (ref 10–40)
BASOPHILS # BLD AUTO: 0.03 K/UL (ref 0–0.2)
BASOPHILS # BLD AUTO: 0.03 K/UL (ref 0–0.2)
BASOPHILS NFR BLD: 0.6 % (ref 0–1.9)
BASOPHILS NFR BLD: 0.6 % (ref 0–1.9)
BILIRUB SERPL-MCNC: 0.3 MG/DL (ref 0.1–1)
BNP SERPL-MCNC: 80 PG/ML (ref 0–99)
BUN SERPL-MCNC: 13 MG/DL (ref 8–23)
CALCIUM SERPL-MCNC: 9.2 MG/DL (ref 8.7–10.5)
CHLORIDE SERPL-SCNC: 109 MMOL/L (ref 95–110)
CO2 SERPL-SCNC: 24 MMOL/L (ref 23–29)
CREAT SERPL-MCNC: 0.7 MG/DL (ref 0.5–1.4)
DIFFERENTIAL METHOD: ABNORMAL
DIFFERENTIAL METHOD: ABNORMAL
EOSINOPHIL # BLD AUTO: 0 K/UL (ref 0–0.5)
EOSINOPHIL # BLD AUTO: 0 K/UL (ref 0–0.5)
EOSINOPHIL NFR BLD: 0.4 % (ref 0–8)
EOSINOPHIL NFR BLD: 0.4 % (ref 0–8)
ERYTHROCYTE [DISTWIDTH] IN BLOOD BY AUTOMATED COUNT: 14.8 % (ref 11.5–14.5)
ERYTHROCYTE [DISTWIDTH] IN BLOOD BY AUTOMATED COUNT: 14.8 % (ref 11.5–14.5)
EST. GFR  (AFRICAN AMERICAN): >60 ML/MIN/1.73 M^2
EST. GFR  (NON AFRICAN AMERICAN): >60 ML/MIN/1.73 M^2
GLUCOSE SERPL-MCNC: 111 MG/DL (ref 70–110)
HCT VFR BLD AUTO: 37.5 % (ref 37–48.5)
HCT VFR BLD AUTO: 37.5 % (ref 37–48.5)
HGB BLD-MCNC: 12.3 G/DL (ref 12–16)
HGB BLD-MCNC: 12.3 G/DL (ref 12–16)
IMM GRANULOCYTES # BLD AUTO: 0.02 K/UL (ref 0–0.04)
IMM GRANULOCYTES # BLD AUTO: 0.02 K/UL (ref 0–0.04)
IMM GRANULOCYTES NFR BLD AUTO: 0.4 % (ref 0–0.5)
IMM GRANULOCYTES NFR BLD AUTO: 0.4 % (ref 0–0.5)
LYMPHOCYTES # BLD AUTO: 1.1 K/UL (ref 1–4.8)
LYMPHOCYTES # BLD AUTO: 1.1 K/UL (ref 1–4.8)
LYMPHOCYTES NFR BLD: 20.2 % (ref 18–48)
LYMPHOCYTES NFR BLD: 20.2 % (ref 18–48)
MAGNESIUM SERPL-MCNC: 2.1 MG/DL (ref 1.6–2.6)
MCH RBC QN AUTO: 31.5 PG (ref 27–31)
MCH RBC QN AUTO: 31.5 PG (ref 27–31)
MCHC RBC AUTO-ENTMCNC: 32.8 G/DL (ref 32–36)
MCHC RBC AUTO-ENTMCNC: 32.8 G/DL (ref 32–36)
MCV RBC AUTO: 96 FL (ref 82–98)
MCV RBC AUTO: 96 FL (ref 82–98)
MONOCYTES # BLD AUTO: 0.3 K/UL (ref 0.3–1)
MONOCYTES # BLD AUTO: 0.3 K/UL (ref 0.3–1)
MONOCYTES NFR BLD: 5.5 % (ref 4–15)
MONOCYTES NFR BLD: 5.5 % (ref 4–15)
NEUTROPHILS # BLD AUTO: 3.9 K/UL (ref 1.8–7.7)
NEUTROPHILS # BLD AUTO: 3.9 K/UL (ref 1.8–7.7)
NEUTROPHILS NFR BLD: 72.9 % (ref 38–73)
NEUTROPHILS NFR BLD: 72.9 % (ref 38–73)
NRBC BLD-RTO: 0 /100 WBC
NRBC BLD-RTO: 0 /100 WBC
PLATELET # BLD AUTO: 283 K/UL (ref 150–450)
PLATELET # BLD AUTO: 283 K/UL (ref 150–450)
PMV BLD AUTO: 11.7 FL (ref 9.2–12.9)
PMV BLD AUTO: 11.7 FL (ref 9.2–12.9)
POTASSIUM SERPL-SCNC: 3.9 MMOL/L (ref 3.5–5.1)
PROT SERPL-MCNC: 8.3 G/DL (ref 6–8.4)
RBC # BLD AUTO: 3.91 M/UL (ref 4–5.4)
RBC # BLD AUTO: 3.91 M/UL (ref 4–5.4)
SODIUM SERPL-SCNC: 142 MMOL/L (ref 136–145)
WBC # BLD AUTO: 5.31 K/UL (ref 3.9–12.7)
WBC # BLD AUTO: 5.31 K/UL (ref 3.9–12.7)

## 2021-11-12 PROCEDURE — 80053 COMPREHEN METABOLIC PANEL: CPT | Performed by: INTERNAL MEDICINE

## 2021-11-12 PROCEDURE — 83735 ASSAY OF MAGNESIUM: CPT | Performed by: INTERNAL MEDICINE

## 2021-11-12 PROCEDURE — 99214 OFFICE O/P EST MOD 30 MIN: CPT | Mod: S$PBB,,, | Performed by: INTERNAL MEDICINE

## 2021-11-12 PROCEDURE — 99205 OFFICE O/P NEW HI 60 MIN: CPT

## 2021-11-12 PROCEDURE — 36415 COLL VENOUS BLD VENIPUNCTURE: CPT | Performed by: INTERNAL MEDICINE

## 2021-11-12 PROCEDURE — 99999 PR PBB SHADOW E&M-EST. PATIENT-LVL IV: ICD-10-PCS | Mod: PBBFAC,,, | Performed by: INTERNAL MEDICINE

## 2021-11-12 PROCEDURE — 85025 COMPLETE CBC W/AUTO DIFF WBC: CPT | Performed by: STUDENT IN AN ORGANIZED HEALTH CARE EDUCATION/TRAINING PROGRAM

## 2021-11-12 PROCEDURE — 83880 ASSAY OF NATRIURETIC PEPTIDE: CPT | Performed by: INTERNAL MEDICINE

## 2021-11-12 PROCEDURE — 99214 OFFICE O/P EST MOD 30 MIN: CPT | Mod: PBBFAC,27 | Performed by: INTERNAL MEDICINE

## 2021-11-12 PROCEDURE — 99214 PR OFFICE/OUTPT VISIT, EST, LEVL IV, 30-39 MIN: ICD-10-PCS | Mod: S$PBB,,, | Performed by: INTERNAL MEDICINE

## 2021-11-12 PROCEDURE — 99999 PR PBB SHADOW E&M-EST. PATIENT-LVL IV: CPT | Mod: PBBFAC,,, | Performed by: INTERNAL MEDICINE

## 2021-11-12 RX ORDER — SODIUM CHLORIDE 0.9 G/100ML
2 IRRIGANT IRRIGATION DAILY PRN
Qty: 3000 ML | Refills: 0 | Status: SHIPPED | OUTPATIENT
Start: 2021-11-12 | End: 2023-02-28

## 2021-11-12 RX ORDER — NIFEDIPINE 90 MG/1
TABLET, FILM COATED, EXTENDED RELEASE ORAL
Qty: 90 TABLET | Refills: 3 | Status: SHIPPED | OUTPATIENT
Start: 2021-11-12 | End: 2022-10-18

## 2021-11-12 RX ORDER — NIFEDIPINE 30 MG/1
30 TABLET, EXTENDED RELEASE ORAL DAILY
Qty: 30 TABLET | Refills: 11 | Status: SHIPPED | OUTPATIENT
Start: 2021-11-12 | End: 2022-11-14

## 2021-11-12 RX ORDER — SILVER 2" X 2"
BANDAGE TOPICAL DAILY PRN
Qty: 10 G | Refills: 3 | Status: SHIPPED | OUTPATIENT
Start: 2021-11-12 | End: 2022-10-04

## 2021-11-15 ENCOUNTER — IMMUNIZATION (OUTPATIENT)
Dept: PHARMACY | Facility: CLINIC | Age: 71
End: 2021-11-15
Payer: MEDICARE

## 2021-11-30 ENCOUNTER — TELEPHONE (OUTPATIENT)
Dept: INTERNAL MEDICINE | Facility: CLINIC | Age: 71
End: 2021-11-30
Payer: MEDICARE

## 2021-11-30 ENCOUNTER — PATIENT MESSAGE (OUTPATIENT)
Dept: PHARMACY | Facility: CLINIC | Age: 71
End: 2021-11-30
Payer: MEDICARE

## 2021-12-01 ENCOUNTER — CLINICAL SUPPORT (OUTPATIENT)
Dept: REHABILITATION | Facility: HOSPITAL | Age: 71
End: 2021-12-01
Attending: INTERNAL MEDICINE
Payer: MEDICARE

## 2021-12-01 DIAGNOSIS — M62.89 PELVIC FLOOR DYSFUNCTION: Primary | ICD-10-CM

## 2021-12-01 PROCEDURE — 97112 NEUROMUSCULAR REEDUCATION: CPT | Mod: PO

## 2021-12-07 ENCOUNTER — TELEPHONE (OUTPATIENT)
Dept: PAIN MEDICINE | Facility: CLINIC | Age: 71
End: 2021-12-07
Payer: MEDICARE

## 2021-12-07 DIAGNOSIS — L98.499 ARTERIAL INSUFFICIENCY WITH ISCHEMIC ULCER: Primary | ICD-10-CM

## 2021-12-07 DIAGNOSIS — I77.1 ARTERIAL INSUFFICIENCY WITH ISCHEMIC ULCER: Primary | ICD-10-CM

## 2021-12-08 ENCOUNTER — HOSPITAL ENCOUNTER (OUTPATIENT)
Dept: VASCULAR SURGERY | Facility: CLINIC | Age: 71
Discharge: HOME | End: 2021-12-08
Attending: SURGERY
Payer: MEDICARE

## 2021-12-08 ENCOUNTER — OFFICE VISIT (OUTPATIENT)
Dept: PAIN MEDICINE | Facility: CLINIC | Age: 71
End: 2021-12-08
Payer: MEDICARE

## 2021-12-08 ENCOUNTER — OFFICE VISIT (OUTPATIENT)
Dept: VASCULAR SURGERY | Facility: CLINIC | Age: 71
End: 2021-12-08
Payer: MEDICARE

## 2021-12-08 VITALS
HEIGHT: 65 IN | WEIGHT: 152.13 LBS | TEMPERATURE: 98 F | DIASTOLIC BLOOD PRESSURE: 56 MMHG | BODY MASS INDEX: 25.34 KG/M2 | HEART RATE: 61 BPM | SYSTOLIC BLOOD PRESSURE: 122 MMHG

## 2021-12-08 DIAGNOSIS — I77.1 ARTERIAL INSUFFICIENCY WITH ISCHEMIC ULCER: ICD-10-CM

## 2021-12-08 DIAGNOSIS — M47.812 CERVICAL SPONDYLOSIS: ICD-10-CM

## 2021-12-08 DIAGNOSIS — G89.4 CHRONIC PAIN DISORDER: ICD-10-CM

## 2021-12-08 DIAGNOSIS — G89.4 CHRONIC PAIN DISORDER: Primary | ICD-10-CM

## 2021-12-08 DIAGNOSIS — L98.499 ARTERIAL INSUFFICIENCY WITH ISCHEMIC ULCER: ICD-10-CM

## 2021-12-08 DIAGNOSIS — I87.2 VENOUS INSUFFICIENCY OF BOTH LOWER EXTREMITIES: Primary | ICD-10-CM

## 2021-12-08 DIAGNOSIS — M79.18 MYOFASCIAL PAIN: ICD-10-CM

## 2021-12-08 DIAGNOSIS — G60.9 IDIOPATHIC NEUROPATHY: ICD-10-CM

## 2021-12-08 DIAGNOSIS — M34.89 CUTANEOUS SCLERODERMA: ICD-10-CM

## 2021-12-08 DIAGNOSIS — I73.9 PVD (PERIPHERAL VASCULAR DISEASE): ICD-10-CM

## 2021-12-08 DIAGNOSIS — M50.30 DDD (DEGENERATIVE DISC DISEASE), CERVICAL: ICD-10-CM

## 2021-12-08 PROCEDURE — 99999 PR PBB SHADOW E&M-EST. PATIENT-LVL IV: CPT | Mod: PBBFAC,,, | Performed by: SURGERY

## 2021-12-08 PROCEDURE — 99999 PR PBB SHADOW E&M-EST. PATIENT-LVL IV: ICD-10-PCS | Mod: PBBFAC,,, | Performed by: SURGERY

## 2021-12-08 PROCEDURE — 99214 OFFICE O/P EST MOD 30 MIN: CPT | Mod: S$PBB,,, | Performed by: SURGERY

## 2021-12-08 PROCEDURE — 93923 UPR/LXTR ART STDY 3+ LVLS: CPT | Mod: 26,S$PBB,, | Performed by: SURGERY

## 2021-12-08 PROCEDURE — 99214 OFFICE O/P EST MOD 30 MIN: CPT | Mod: PBBFAC | Performed by: SURGERY

## 2021-12-08 PROCEDURE — 99214 PR OFFICE/OUTPT VISIT, EST, LEVL IV, 30-39 MIN: ICD-10-PCS | Mod: S$PBB,,, | Performed by: SURGERY

## 2021-12-08 PROCEDURE — 93923 UPR/LXTR ART STDY 3+ LVLS: CPT | Mod: PBBFAC | Performed by: SURGERY

## 2021-12-08 PROCEDURE — 93923 PR NON-INVASIVE PHYSIOLOGIC STUDY EXTREMITY 3 LEVELS: ICD-10-PCS | Mod: 26,S$PBB,, | Performed by: SURGERY

## 2021-12-08 PROCEDURE — 99213 PR OFFICE/OUTPT VISIT, EST, LEVL III, 20-29 MIN: ICD-10-PCS | Mod: 95,,, | Performed by: NURSE PRACTITIONER

## 2021-12-08 PROCEDURE — 99213 OFFICE O/P EST LOW 20 MIN: CPT | Mod: 95,,, | Performed by: NURSE PRACTITIONER

## 2021-12-08 RX ORDER — ACETAMINOPHEN AND CODEINE PHOSPHATE 300; 30 MG/1; MG/1
1 TABLET ORAL DAILY PRN
Qty: 30 TABLET | Refills: 0 | Status: SHIPPED | OUTPATIENT
Start: 2021-12-23 | End: 2022-01-22

## 2021-12-08 RX ORDER — NABUMETONE 750 MG/1
750 TABLET, FILM COATED ORAL 2 TIMES DAILY PRN
Qty: 60 TABLET | Refills: 3 | Status: SHIPPED | OUTPATIENT
Start: 2021-12-08 | End: 2022-05-26 | Stop reason: SDUPTHER

## 2021-12-08 RX ORDER — TIZANIDINE 4 MG/1
4 TABLET ORAL NIGHTLY PRN
Qty: 30 TABLET | Refills: 3 | Status: SHIPPED | OUTPATIENT
Start: 2021-12-08 | End: 2022-02-15 | Stop reason: SDUPTHER

## 2021-12-15 ENCOUNTER — CLINICAL SUPPORT (OUTPATIENT)
Dept: REHABILITATION | Facility: HOSPITAL | Age: 71
End: 2021-12-15
Attending: INTERNAL MEDICINE
Payer: MEDICARE

## 2021-12-15 DIAGNOSIS — M62.89 PELVIC FLOOR DYSFUNCTION: Primary | ICD-10-CM

## 2021-12-15 PROCEDURE — 97112 NEUROMUSCULAR REEDUCATION: CPT | Mod: PO

## 2021-12-18 DIAGNOSIS — E55.9 VITAMIN D DEFICIENCY: ICD-10-CM

## 2021-12-18 DIAGNOSIS — E78.5 HYPERLIPIDEMIA: ICD-10-CM

## 2021-12-18 DIAGNOSIS — M34.9 SCLERODERMA: ICD-10-CM

## 2021-12-21 ENCOUNTER — SPECIALTY PHARMACY (OUTPATIENT)
Dept: PHARMACY | Facility: CLINIC | Age: 71
End: 2021-12-21
Payer: MEDICARE

## 2021-12-21 RX ORDER — PRAVASTATIN SODIUM 20 MG/1
TABLET ORAL
Qty: 90 TABLET | Refills: 1 | Status: SHIPPED | OUTPATIENT
Start: 2021-12-21 | End: 2022-08-02

## 2021-12-21 RX ORDER — ERGOCALCIFEROL 1.25 MG/1
CAPSULE ORAL
Qty: 12 CAPSULE | Refills: 1 | Status: SHIPPED | OUTPATIENT
Start: 2021-12-21 | End: 2022-05-25 | Stop reason: SDUPTHER

## 2021-12-28 ENCOUNTER — TELEPHONE (OUTPATIENT)
Dept: RESEARCH | Facility: HOSPITAL | Age: 71
End: 2021-12-28
Payer: MEDICARE

## 2021-12-29 ENCOUNTER — RESEARCH ENCOUNTER (OUTPATIENT)
Dept: RESEARCH | Facility: HOSPITAL | Age: 71
End: 2021-12-29

## 2021-12-29 ENCOUNTER — HOSPITAL ENCOUNTER (OUTPATIENT)
Dept: CARDIOLOGY | Facility: HOSPITAL | Age: 71
Discharge: HOME | End: 2021-12-29
Attending: STUDENT IN AN ORGANIZED HEALTH CARE EDUCATION/TRAINING PROGRAM
Payer: MEDICARE

## 2021-12-29 VITALS
SYSTOLIC BLOOD PRESSURE: 177 MMHG | DIASTOLIC BLOOD PRESSURE: 75 MMHG | HEIGHT: 65 IN | WEIGHT: 152 LBS | BODY MASS INDEX: 25.33 KG/M2 | HEART RATE: 54 BPM

## 2021-12-29 DIAGNOSIS — M34.9 SCLERODERMA: ICD-10-CM

## 2021-12-29 LAB
ASCENDING AORTA: 2.92 CM
AV INDEX (PROSTH): 0.71
AV MEAN GRADIENT: 4 MMHG
AV PEAK GRADIENT: 6 MMHG
AV VALVE AREA: 3.09 CM2
AV VELOCITY RATIO: 0.7
BSA FOR ECHO PROCEDURE: 1.78 M2
CV ECHO LV RWT: 0.31 CM
DOP CALC AO PEAK VEL: 1.27 M/S
DOP CALC AO VTI: 33.56 CM
DOP CALC LVOT AREA: 4.4 CM2
DOP CALC LVOT DIAMETER: 2.36 CM
DOP CALC LVOT PEAK VEL: 0.89 M/S
DOP CALC LVOT STROKE VOLUME: 103.71 CM3
DOP CALCLVOT PEAK VEL VTI: 23.72 CM
E WAVE DECELERATION TIME: 282.16 MSEC
E/A RATIO: 1.04
E/E' RATIO: 12.92 M/S
ECHO LV POSTERIOR WALL: 0.82 CM (ref 0.6–1.1)
EJECTION FRACTION: 63 %
FRACTIONAL SHORTENING: 37 % (ref 28–44)
INTERVENTRICULAR SEPTUM: 0.62 CM (ref 0.6–1.1)
LA MAJOR: 4.22 CM
LA MINOR: 4.22 CM
LA WIDTH: 3.56 CM
LEFT ATRIUM SIZE: 3.69 CM
LEFT ATRIUM VOLUME INDEX MOD: 20.5 ML/M2
LEFT ATRIUM VOLUME INDEX: 26.8 ML/M2
LEFT ATRIUM VOLUME MOD: 36.13 CM3
LEFT ATRIUM VOLUME: 47.12 CM3
LEFT INTERNAL DIMENSION IN SYSTOLE: 3.33 CM (ref 2.1–4)
LEFT VENTRICLE DIASTOLIC VOLUME INDEX: 77.22 ML/M2
LEFT VENTRICLE DIASTOLIC VOLUME: 135.91 ML
LEFT VENTRICLE MASS INDEX: 75 G/M2
LEFT VENTRICLE SYSTOLIC VOLUME INDEX: 25.7 ML/M2
LEFT VENTRICLE SYSTOLIC VOLUME: 45.22 ML
LEFT VENTRICULAR INTERNAL DIMENSION IN DIASTOLE: 5.31 CM (ref 3.5–6)
LEFT VENTRICULAR MASS: 131.91 G
LV LATERAL E/E' RATIO: 10.5 M/S
LV SEPTAL E/E' RATIO: 16.8 M/S
MV A" WAVE DURATION": 12.94 MSEC
MV PEAK A VEL: 0.81 M/S
MV PEAK E VEL: 0.84 M/S
MV STENOSIS PRESSURE HALF TIME: 81.83 MS
MV VALVE AREA P 1/2 METHOD: 2.69 CM2
PISA MRMAX VEL: 0.05 M/S
PISA TR MAX VEL: 2.68 M/S
PULM VEIN S/D RATIO: 1.42
PV PEAK D VEL: 0.26 M/S
PV PEAK S VEL: 0.37 M/S
RA MAJOR: 3.74 CM
RA PRESSURE: 3 MMHG
RA WIDTH: 3.36 CM
RIGHT VENTRICULAR END-DIASTOLIC DIMENSION: 3.34 CM
RV TISSUE DOPPLER FREE WALL SYSTOLIC VELOCITY 1 (APICAL 4 CHAMBER VIEW): 12.42 CM/S
SINUS: 2.86 CM
STJ: 2.8 CM
TDI LATERAL: 0.08 M/S
TDI SEPTAL: 0.05 M/S
TDI: 0.07 M/S
TR MAX PG: 29 MMHG
TRICUSPID ANNULAR PLANE SYSTOLIC EXCURSION: 2.08 CM
TV REST PULMONARY ARTERY PRESSURE: 32 MMHG

## 2021-12-29 PROCEDURE — 93306 TTE W/DOPPLER COMPLETE: CPT

## 2021-12-29 PROCEDURE — 93306 ECHO (CUPID ONLY): ICD-10-PCS | Mod: 26,,, | Performed by: INTERNAL MEDICINE

## 2021-12-29 PROCEDURE — 93306 TTE W/DOPPLER COMPLETE: CPT | Mod: 26,,, | Performed by: INTERNAL MEDICINE

## 2021-12-30 RX ORDER — TADALAFIL 20 MG/1
40 TABLET ORAL DAILY
Qty: 28 TABLET | Refills: 11 | Status: SHIPPED | OUTPATIENT
Start: 2021-12-30 | End: 2022-03-08 | Stop reason: SDUPTHER

## 2022-01-03 ENCOUNTER — HOSPITAL ENCOUNTER (OUTPATIENT)
Dept: PULMONOLOGY | Facility: CLINIC | Age: 72
Discharge: HOME | End: 2022-01-03
Payer: MEDICARE

## 2022-01-03 ENCOUNTER — OFFICE VISIT (OUTPATIENT)
Dept: RHEUMATOLOGY | Facility: CLINIC | Age: 72
End: 2022-01-03
Payer: MEDICARE

## 2022-01-03 ENCOUNTER — TELEPHONE (OUTPATIENT)
Dept: PAIN MEDICINE | Facility: CLINIC | Age: 72
End: 2022-01-03
Payer: MEDICARE

## 2022-01-03 VITALS
SYSTOLIC BLOOD PRESSURE: 111 MMHG | HEIGHT: 65 IN | DIASTOLIC BLOOD PRESSURE: 58 MMHG | BODY MASS INDEX: 26.04 KG/M2 | HEART RATE: 73 BPM | WEIGHT: 156.31 LBS

## 2022-01-03 DIAGNOSIS — J84.9 ILD (INTERSTITIAL LUNG DISEASE): ICD-10-CM

## 2022-01-03 DIAGNOSIS — M34.9 SCLERODERMA: ICD-10-CM

## 2022-01-03 DIAGNOSIS — I87.2 VENOUS INSUFFICIENCY OF BOTH LOWER EXTREMITIES: ICD-10-CM

## 2022-01-03 DIAGNOSIS — E55.9 VITAMIN D DEFICIENCY: ICD-10-CM

## 2022-01-03 DIAGNOSIS — M85.80 OSTEOPENIA, UNSPECIFIED LOCATION: ICD-10-CM

## 2022-01-03 DIAGNOSIS — I27.20 PULMONARY HYPERTENSION: ICD-10-CM

## 2022-01-03 DIAGNOSIS — M34.9 SCLERODERMA: Primary | ICD-10-CM

## 2022-01-03 DIAGNOSIS — Z01.812 PRE-PROCEDURE LAB EXAM: Primary | ICD-10-CM

## 2022-01-03 LAB
FEF 25 75 LLN: 0.83
FEF 25 75 PRE REF: 77.7 %
FEF 25 75 REF: 1.81
FET100 CHG: 1 %
FEV05 LLN: 0.89
FEV05 REF: 1.75
FEV1 CHG: 9.8 %
FEV1 FVC LLN: 67
FEV1 FVC PRE REF: 100 %
FEV1 FVC REF: 79
FEV1 LLN: 1.54
FEV1 PRE REF: 68 %
FEV1 REF: 2.12
FEV1 VOL CHG: 0.14
FVC CHG: 5.4 %
FVC LLN: 2.01
FVC PRE REF: 67.5 %
FVC REF: 2.71
FVC VOL CHG: 0.1
PEF LLN: 4.38
PEF PRE REF: 98.6 %
PEF REF: 5.87
PHYSICIAN COMMENT: ABNORMAL
POST FEF 25 75: 1.86 L/S (ref 0.83–2.79)
POST FET 100: 6.2 SEC
POST FEV1 FVC: 82.2 % (ref 66.62–91.03)
POST FEV1: 1.58 L (ref 1.54–2.7)
POST FEV5: 1.37 L (ref 0.89–2.6)
POST FVC: 1.93 L (ref 2.01–3.41)
POST PEF: 6.15 L/S (ref 4.38–7.35)
PRE FEF 25 75: 1.41 L/S (ref 0.83–2.79)
PRE FET 100: 6.14 SEC
PRE FEV05 REF: 70.1 %
PRE FEV1 FVC: 78.84 % (ref 66.62–91.03)
PRE FEV1: 1.44 L (ref 1.54–2.7)
PRE FEV5: 1.23 L (ref 0.89–2.6)
PRE FVC: 1.83 L (ref 2.01–3.41)
PRE PEF: 5.78 L/S (ref 4.38–7.35)
SARS-COV-2 RDRP RESP QL NAA+PROBE: NEGATIVE

## 2022-01-03 PROCEDURE — 94060 EVALUATION OF WHEEZING: CPT | Mod: 26,S$PBB,, | Performed by: INTERNAL MEDICINE

## 2022-01-03 PROCEDURE — 99214 PR OFFICE/OUTPT VISIT, EST, LEVL IV, 30-39 MIN: ICD-10-PCS | Mod: S$PBB,GC,, | Performed by: STUDENT IN AN ORGANIZED HEALTH CARE EDUCATION/TRAINING PROGRAM

## 2022-01-03 PROCEDURE — 99214 OFFICE O/P EST MOD 30 MIN: CPT | Mod: S$PBB,GC,, | Performed by: STUDENT IN AN ORGANIZED HEALTH CARE EDUCATION/TRAINING PROGRAM

## 2022-01-03 PROCEDURE — 99999 PR PBB SHADOW E&M-EST. PATIENT-LVL V: CPT | Mod: PBBFAC,GC,, | Performed by: STUDENT IN AN ORGANIZED HEALTH CARE EDUCATION/TRAINING PROGRAM

## 2022-01-03 PROCEDURE — 99215 OFFICE O/P EST HI 40 MIN: CPT | Mod: PBBFAC,25 | Performed by: STUDENT IN AN ORGANIZED HEALTH CARE EDUCATION/TRAINING PROGRAM

## 2022-01-03 PROCEDURE — 94060 EVALUATION OF WHEEZING: CPT | Mod: PBBFAC | Performed by: INTERNAL MEDICINE

## 2022-01-03 PROCEDURE — U0002 COVID-19 LAB TEST NON-CDC: HCPCS | Performed by: STUDENT IN AN ORGANIZED HEALTH CARE EDUCATION/TRAINING PROGRAM

## 2022-01-03 PROCEDURE — 99999 PR PBB SHADOW E&M-EST. PATIENT-LVL V: ICD-10-PCS | Mod: PBBFAC,GC,, | Performed by: STUDENT IN AN ORGANIZED HEALTH CARE EDUCATION/TRAINING PROGRAM

## 2022-01-03 PROCEDURE — 94060 PR EVAL OF BRONCHOSPASM: ICD-10-PCS | Mod: 26,S$PBB,, | Performed by: INTERNAL MEDICINE

## 2022-01-03 NOTE — PROGRESS NOTES
Rapid3 Question Responses and Scores 12/27/2021   MDHAQ Score 1   Psychologic Score 3.3   Pain Score 7   When you awakened in the morning OVER THE LAST WEEK, did you feel stiff? No   Fatigue Score 5   Global Health Score 5   RAPID3 Score 5.11     Answers for HPI/ROS submitted by the patient on 12/27/2021  fever: No  eye redness: No  mouth sores: No  headaches: No  shortness of breath: No  chest pain: No  trouble swallowing: No  diarrhea: No  constipation: No  unexpected weight change: No  genital sore: No  dysuria: No  During the last 3 days, have you had a skin rash?: No  Bruises or bleeds easily: No  cough: No

## 2022-01-03 NOTE — TELEPHONE ENCOUNTER
This message is for patient in regards to his/her appointment 01/04/22 at 11:00a       [facility] Policy: For the safety of all patients and staff members.     Patient Visitor policy: Due to social distancing and limited seating staff are requesting patient to arrive to their schedule appointments on time. During this visit we're asking all patients to only have one visitor over the age of 18yrs old to accompany to be seen by Viktoriya turpin NP. If patient do not required assistance with their visit, we're asking all visitors to remain outside the waiting area.    Upon arriving to your schedule appointment; patients are required to wear a face mask, if patient do not have a face mask one will be provided entering the facility. We ask patients to contact clinical staff at this number [phone] to notify staff that they have arrived or they may do so by utilizing the Mobile checked in [place](if patient have patient portal; clinical staff will send a message through there letting them know it's okay to proceed to their visit). If you have any questions or concerns please contact [phone]    Staff left  for patient regarding appt date & time listed above.

## 2022-01-03 NOTE — PROGRESS NOTES
Subjective:       Patient ID: Yamel Shah is a 71 y.o. female.    Chief Complaint: Systemic scleroderma disease    HPI     Yamel Shah is a 71 y.o. 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 She was originally diagnosed in 1983 and has been seeing Dr. Ahuja for over 10 years.( +SSA, +SCL-70, -RNAP3, ILD). She has been on MMF since 2019. Used to follow with Dr. Leigh in pulmonology but now follows with Dr. Merlos. Additionally, she has chronic ulcers in her feet and sees podiatry and vascular. She is s/p laser venous ablation on right foot in 12/2020 by Dr. Claudio. She follows with Dr. Martins in Cardiology and had a right heart cath 1/2021 which showed normal PA pressures. She takes tadalafil. Her last echo was in 9/2020 with indeteriminate diasystolic dysfunction and PA 40 mmhg.     Interval History:  10/27/21: Doing well today, does report persistent pain in her feet due to her chronic ulcers. She will see podiatry later today and states that she thinks that they are healing slowly. She denies joint pain, joint swelling, oral/nasal ulcers, fevers, or chest pain. She has occasional shortness of breath when she goes up stairs but states that this is stable. She was last seen by Dr. Merlos in pulmonology on 7/2021 where her PFTs were noted to be stable. Prior to this, seen by Dr. Leigh in 5/2020, stated that not a candidate for antifibrotic at that time. She will see podiatry later today for the chronic ulcers on her feet and states that these appear to be healing slowly. She last saw vascular surgery in 12/2020. Her last echo was in 9/2020 with indeteriminate diasystolic dysfunction and PA 40 mmhg. She had a right heart cath 1/2021 which showed normal PA pressures. She denies joint pain, joint swelling, oral/nasal ulcers, fevers,  "or chest pain.    1/3/21: she reports that she is doing well and that her ulcers are unchanged from prior visit. She was seen by Dr. Claudio in vascular surgery who stated that the ulcers due to venous insufficiency and not arterial, requiring podiatry and wound care. ABIs were normal from 12/2021. Last echo in 12/29/21 showed normal EF with PA pressure 32 hgmm (prior in 2020 was 40 hgmm). She denies increased SOB or chest pain. Got PneumoVax 23 in 11/2021. She has not had an issue with Raynaud's this season. She continues on nifedipine and tadalafil. Reports receiving COVID booster since last appointment but did not hold MMF for a week after this.    Treatment Regimen: (as of 1/3/21)  She is currently on MMF 1000mg BID (liquid suspension). Reports compliance with this    Review of Systems   Constitutional: Negative for fever and unexpected weight change.   HENT: Positive for trouble swallowing. Negative for mouth sores.    Eyes: Negative for redness.   Respiratory: Negative for cough and shortness of breath.    Cardiovascular: Negative for chest pain.   Gastrointestinal: Negative for constipation and diarrhea.   Genitourinary: Negative for dysuria and genital sores.   Musculoskeletal: Negative for joint swelling.   Skin: Positive for rash.   Neurological: Negative for headaches.   Hematological: Bruises/bleeds easily.         Objective:   BP (!) 111/58   Pulse 73   Ht 5' 5" (1.651 m)   Wt 70.9 kg (156 lb 4.9 oz)   BMI 26.01 kg/m²      Physical Exam   Vitals reviewed.  Constitutional: She is oriented to person, place, and time and well-developed, well-nourished, and in no distress. No distress.   HENT:   Head: Normocephalic and atraumatic.   Right Ear: External ear normal.   Left Ear: External ear normal.   Nose: Nose normal.   Mouth/Throat: Oropharynx is clear and moist.   Eyes: Conjunctivae and EOM are normal. Right eye exhibits no discharge. Left eye exhibits no discharge. No scleral icterus.   Neck: No " thyromegaly present.   Cardiovascular: Normal rate, regular rhythm, normal heart sounds and intact distal pulses.    No murmur heard.  Pulmonary/Chest: Effort normal and breath sounds normal. No respiratory distress. She has no wheezes. She exhibits no tenderness.   Crackles b/l stable from prior exam   Abdominal: Soft. Bowel sounds are normal. She exhibits no distension. There is no abdominal tenderness.   Neurological: She is alert and oriented to person, place, and time.   Skin: Skin is warm and dry. No rash noted. She is not diaphoretic. No erythema.     Psychiatric: Mood and affect normal.   Musculoskeletal: Normal range of motion. Deformity present. No tenderness or edema.      Comments: Skin tightness  Claw hand deformity, flexion contractures b/l  Skin score: 29  Chronic LE ulcers. New L dorsal foot ulcer.       Modified Skin Score:  Skin score head= 0  Chest= 1  Stomach=2  Right upper arm=1  Left upper arm=1    Right lower arm=1  Left lower arm: 1  Right hand=3 Left hand=2  Fingers= 3        Fingers=3  Right upper leg= 2  Left upper leg=2  Right lower leg= 1  Left lower leg= 1  Right foot= 3            Left foot= 3    Modified Skin Score: 29      Assessment:       1. Scleroderma    2. Venous insufficiency of both lower extremities    3. ILD (interstitial lung disease)    4. Pulmonary hypertension    5. Osteopenia, unspecified location    6. Vitamin D deficiency        Yamel Shah is a 70 y.o. female with SSc (+SSA, +SCL-70, -RNAP3) with ILD, diastolic dysfunction, and GERD with Pagan's esophagus who presents for follow up for disease management. She was originally diagnosed in 1983 and has been seeing Dr. Ahuja for over 10 years.( +SSA, +SCL-70, -RNAP3, ILD). She has been on MMF since 2019. Used to follow with Dr. Leigh in pulmonology but now follows with Dr. Merlos. Additionally, she has chronic ulcers in her feet and sees podiatry and vascular. She is s/p laser venous ablation on right  foot in 12/2020 by Dr. Claudio. She follows with Dr. Martins in Cardiology and had a right heart cath 1/2021 which showed normal PA pressures. She takes tadalafil. Her last echo was in 9/2020 with indeteriminate diasystolic dysfunction and PA 40 mmhg. She comes for follow up today. She is due for k2cittr PFTs.  Will obtain labs  with the addition uric acid and pro-BNP to help stratify using the DETECT algorithm. Will also get COVID ab to determine if developed immunity from recent COVID vaccine.    Plan:       Problem List Items Addressed This Visit        Pulmonary    Pulmonary hypertension    ILD (interstitial lung disease)       Cardiac/Vascular    Venous insufficiency of both lower extremities       Endocrine    Vitamin D deficiency       Orthopedic    Osteopenia      Other Visit Diagnoses     Scleroderma    -  Primary    Relevant Orders    Ambulatory referral/consult to Pulmonology        - continue MMF 1000mg BID (liquid suspension)  - PFTs ordered last appointment but have scheduled today, to see pulmonology in 1/26/2022, to evaluate if she would benefit from anti-fibrotic agent  - Will get labs today, pro-BNP and uric acid next time to help stratify using DETECT algorithm   - up to date on covid series and covid booster, will get COVID ab to see if developed immunity    Follow-up in 3 months with labs before visit    Patient seen and evaluated with Dr. Ahuja

## 2022-01-04 ENCOUNTER — OFFICE VISIT (OUTPATIENT)
Dept: PAIN MEDICINE | Facility: CLINIC | Age: 72
End: 2022-01-04
Payer: MEDICARE

## 2022-01-04 DIAGNOSIS — G89.4 CHRONIC PAIN DISORDER: Primary | ICD-10-CM

## 2022-01-04 DIAGNOSIS — M34.89 CUTANEOUS SCLERODERMA: ICD-10-CM

## 2022-01-04 DIAGNOSIS — M50.30 DDD (DEGENERATIVE DISC DISEASE), CERVICAL: ICD-10-CM

## 2022-01-04 DIAGNOSIS — M79.18 MYOFASCIAL PAIN: ICD-10-CM

## 2022-01-04 DIAGNOSIS — G60.9 IDIOPATHIC NEUROPATHY: ICD-10-CM

## 2022-01-04 DIAGNOSIS — M47.812 CERVICAL SPONDYLOSIS: ICD-10-CM

## 2022-01-04 PROCEDURE — 99213 PR OFFICE/OUTPT VISIT, EST, LEVL III, 20-29 MIN: ICD-10-PCS | Mod: 95,,, | Performed by: NURSE PRACTITIONER

## 2022-01-04 PROCEDURE — 99213 OFFICE O/P EST LOW 20 MIN: CPT | Mod: 95,,, | Performed by: NURSE PRACTITIONER

## 2022-01-04 NOTE — PROGRESS NOTES
I have personally reviewed the history, confirmed exam findings, and discussed assessment and plan with fellow. Would favor adding nintedinib for anti-fibrotic effect added to mycophenolate for scleroderma ILD SARINA

## 2022-01-04 NOTE — PROGRESS NOTES
Chronic Pain - Follow Up  Chronic Pain-Tele-Medicine-Established Note (Follow up visit)        The patient location is: Home  The chief complaint leading to consultation is: pain  Visit type: Virtual visit with synchronous audio and video  Total time spent with patient: 25 min  Each patient to whom he or she provides medical services by telemedicine is:  (1) informed of the relationship between the physician and patient and the respective role of any other health care provider with respect to management of the patient; and (2) notified that he or she may decline to receive medical services by telemedicine and may withdraw from such care at any time.      Referring Physician: No ref. provider found    Chief Complaint:   Chief Complaint   Patient presents with    Neck Pain        SUBJECTIVE: Disclaimer: This note has been generated using voice-recognition software. There may be typographical errors that have been missed during proof-reading    Interval History 1/4/2022:  The patient returns to clinic today for follow up of foot pain via virtual visit. She continues to report foot pain and wounds. She continues to follow up with podiatry and wound care. She reports increased neck pain. This pain is tight and aching in nature. She denies any radicular arm pain. She is currently taking Relafen, Lyrica, and Tylenol #3 with benefit and without adverse effects. She reports limited benefit with Zanaflex. She denies any other health changes.     Interval History 12/8/2021:  The patient returns to clinic today for foot pain via virtual visit. She continues to report foot pain. She does have foot wounds. She recently saw Dr. Claudio in [facility]. He does not recommend any vascular interventions at this time. She continues to follow up with Podiatry and wound care. She continues to take Zanaflex, Relafen, Lyrica and Tylenol #3 with benefit. She denies any adverse effects. She denies any other health changes. Her pain today is  7/10.    Interval History 11/8/2021:  The patient returns to clinic today for follow up of foot pain via virtual visit. At last visit, she was starting on Tylenol #3. She does find benefit with this. She sometimes breaks this in half. She continues to take Zanaflex, Relafen, and Lyrica. She continues to report bilateral foot pain and ulcers. She continues to follow up with podiatry. Her pain is worse at night. She denies any other health changes. Her pain today is 7/10.    Interval History 10/20/2021:  The patient returns to clinic today for follow up of foot pain. At last visit, she was started on Tramadol. She did have relief but had significant side effects. She experienced sedation, itching, headaches, and decreased appetite. This has resolved after stopping the medication. She continues to report foot pain. This pain is worse at night. She continues to follow up with podiatry. She continues to take Tizanidine, Relafen, and Lyrica with some benefit. She denies any adverse effects. She denies any other health changes. Her pain today is 8/10.    Interval history 10/06/2021:  Since previous encounter the patient continues to have nonhealing wound has been followed up with wound care and is scheduled to follow with Rheumatology for the wounds in her legs she was referred to podiatry with stated that they have done nothing different me that she with doing on her own.  She continues to have neck pain and bilateral foot pain.  She has been taking tizanidine Relafen Tylenol p.m. and Lyrica 600 mg per day.  She states that all these medications are helpful.    Interval History 6/3/2021:  The patient returns to clinic today for follow up of foot pain. She continues to report left foot pain secondary to ulcer. She is seeing Wound Care. She describes this pain as burning in nature. Her pain is worse with prolonged activity. She reports intermittent neck pain. She continues to take Zanaflex. She reports limited benefit with  increased Lyrica dose. She denies any other health changes. Her pain today is 5/10.    Interval History 5/5/2021:  The patient returns to clinic today for follow up of foot pain. She reports a new ulcer on her left foot. She is seeing Wound Care. She reports increased pain with this new ulcer. She describes this pain as burning. She continues to report neck pain that is tight and aching. She denies any radicular arm pain. Her pain is worse with prolonged activity, especially turning her head to the side. She continues to perform a home exercise routine. She continues to take Lyrica and Zanaflex with benefit. She denies any other health changes. Her pain today is 5/10.    Interval History 2/8/2021:  The patient returns to clinic today for follow up of neck and foot pain. She reports that her neck pain has significantly improved since last visit. She has recently completed physical therapy for this. She is performing a home stretching routine. She reports intermittent neck pain that is tight and aching in nature. She denies any radicular arm pain. She continues to report bilateral foot pain. This is worse at night. She continues to take Lyrica and Zanaflex with benefit. She denies any other health changes. Her pain today is 4/10.    Interval History 1/8/2021:  The patient returns to clinic today for follow up of neck and foot pain. She continues to report neck pain that is tight and aching in nature. She denies any radicular pain. She continues to participate in physical therapy and a home exercise routine. She continues to report bilateral foot pain, worse at night. She reports that increased Lyrica dose has provided improved benefit. She also takes Zanaflex with benefit. She denies any other health changes. Her pain today is 3/10.    Interval History 11/13/2020:  The patient returns to clinic today for follow up of neck and foot pain. She continues to report bilateral foot pain. Since last visit, she had a venous  ablation procedure on her right leg through Vascular. She is scheduled for the left side in the next month. She continues to report bilateral foot pain, worse at night. She continues to report neck pain that is tight and aching in nature. She denies any radicular pain. Her pain is worse flexion and turning her head to the side. She is currently participating in physical therapy with some benefit. She continues to take Lyrica but does ask if this could be increased. She continues to take Zanaflex with benefit. She denies any other health changes. Her pain today is 5/10    Interval History 10/2/2020:  The patient returns to clinic today for follow up of foot pain. She reports increased bilateral foot pain over the last few months. This pain is burning in nature. This pain is worse at night. She continues to have ulcers to her feet related to her scleroderma. She would like to restart the Lyrica. She also reports increased neck pain that is sore and aching in nature. She denies any radiating arm pain. This is worse with turning her head and at night. She denies any other health changes. Her pain today is 7/10.    Interval History 6/5/2020:  The patient requests audio visit today for follow up of bilateral foot pain. She reports intermittent foot pain that is tolerable at this time. She has discontinued Lyrica as of April. She continues to have ulcers to her feet. She does have wound care. She denies any other health changes.     Interval History 1/17/2020:  The patient returns to clinic today for follow up. She continues to report bilateral foot pain. She describes this pain as burning and tingling in nature. At last visit, we decreased her Lyrica dose to 100 mg at night. She reports increased pain since then. She would like to go to back to the 150 mg dose. She continues to have ulcers to her feet, left worse than right. She continues to participate in a home wound care program. She denies any other health changes.  Her pain today is 6/10.    Interval History 12/17/2019:  The patient returns to clinic today for follow up. She reports improving foot pain. She reports improved healing ulcers to her left foot. She continues to report right foot pain that is burning and tingling in nature. She continues to participate in a home wound care routine. She continues to take Lyrica. She asks about decreasing this. She denies any other health changes. Her pain today is 5/10.    Interval History 9/17/2019:  The patient returns to clinic today for follow up. She continues to report bilateral foot pain that is burning and tingling in nature. Her pain is worse with sitting and at night. She continues to have slow healing ulcers to both feet. She continues to perform a home wound care program. She is no longer taking Gabapentin which was previously called in. She is now taking Pregabalin generic with benefit. She denies any other health changes. Her pain today is 4/10.    Interval History 6/13/2019:  The patient returns to clinic for follow up for bilateral foot pain. She continues to report bilateral foot pain that is burning in nature. She continues to have slow healing wounds to both feet from ulcers. She continues to take Lyrica once daily but reports increased pain. She continues to take Vitamin B12. She denies any other health changes. Her pain today is 8/10.    Interval History 3/13/2019:  The patient returns to clinic today for follow up. She continues to report bilateral foot pain that is burning and tingling in nature. Her pain is worse with prolonged walking and standing. She continues to have bilateral foot wounds. She reports that these wounds have significantly improved since last visit. She is currently taking Vitamin B12 with benefit. She continues to report benefit with Lyrica. She is currently taking this once daily. She denies any other health changes. Her pain today is 5/10.    Interval History 2/6/2019:  The patient  returns to clinic today for follow up. She reports that her bilateral foot wounds have significantly improved since last visit. She does report some significant improvement in her foot pain. She reports intermittent bilateral foot pain especially with prolonged walking. She describes this pain as heavy and tingling in nature. She is no longer taking Celebrex. She is currently taking Lyrica 150 mg BID with benefit. She does report that the Lyrica is expensive for her. She denies any other health changes. Her pain today is 5/10.    Interval History 12/5/18:  Patient returns to clinic today for follow up. She reports that since increasing her medications, she is having significant improvement in pain during the day time. She is currently taking Lyrica 75mg TID and Celebrex 200mg TID. However, she states that the burning  And tingling pain in both her feet increase in the evening around 6 or 7pm. She is unable to sleep during the nights due to the pain. She is still wearing her bilateral boots due to non healing ulcers from her scleroderma.     Interval History 8/30/2018:  The patient returns to clinic today for follow up. She continues to report bilateral foot pain that is burning and tingling in nature. She reports that this pain is worse at night. She is currently taking Lyrica 75 mg BID with limited relief. She did not have any benefit with Mobic. She continues to take Aleve with some benefit. She continues to have nonhealing ulcers. She wears an ACE bandage to her right calf, as well as boots to her bilateral feet. She denies any other health changes. Her pain today is 7/10.     Interval History 7/11/2018:  Since previous encounter she has weaned from gabapentin to 600mg / day and did not notice significant worsening of pain, but was having somnelence from higher doses of the medication in the past.  We are weaning in an attempt to trial Lyrica as an alternative to gabapentin.  Tramadol causes significant sedation,  limiting its use.  She has discontinued the use of the tramadol.  Additionally she does have benefit from anti-inflammatory medication, has been using aleve, has not trialed CANTRELL-2 inhibitors in the past.    Interval history 05/16/2018:      The patient has had x-rays of the lumbar spine which did not reveal any evidence of significant neuroforaminal stenosis with degenerative disc disease.  There is mild facet arthropathy.  She has escalated her gabapentin and is currently taking 2100 mg of gabapentin per day without any noticeable improvement but daytime somnolence.  She continues to have nonhealing ulcers and topical pain cream is helping to a limited degree over her leg in areas where there is no skin disruption.    Initial encounter:    Yamel Shah presents to the clinic for the evaluation of foot pain. The pain started 2 years ago following ulcers and symptoms have been worsening.    Brief history: History of scleroderma    Pain Description:    The pain is located in the both feet in the area of slowly healing chronic foot ulcers which were partly from venous insufficiency and stasis associated with her scleroderma.  In her right lower extremity she describes radicular symptoms in the L4/5 distribution.    At BEST  5/10     At WORST  10/10 on the WORST day.      On average pain is rated as 8/10.     Today the pain is rated as 8/10    The pain is described as burning, sharp, shooting and constant      Symptoms interfere with daily activity, sleeping and work.     Exacerbating factors: Laying, Walking, Night Time, Morning and Getting out of bed/chair.      Mitigating factors medications.     Patient denies night fever/night sweats, urinary incontinence, bowel incontinence, significant weight loss, significant motor weakness and loss of sensations.  Patient denies any suicidal or homicidal ideations    Pain Medications:  Current:  Lyrica 300 mg daily  Zanaflex    Tried in Past:  NSAIDs -Aleve, Mobic,  Celebrex  TCA -Never  SNRI -Never  Anti-convulsants - Gabapentin, Lyrica  Muscle Relaxants -Never  Opioids-Tramadol    Physical Therapy/Home Exercise: no       report:  Reviewed and consistent with medication use as prescribed.    Pain Procedures: none    Chiropractor -never  Acupuncture - never  TENS unit -never  Spinal decompression -never  Joint replacement -never    Imaging:   Xray Cervical Spine 12/6/2019:  FINDINGS:  Odontoid prevertebral soft tissues and posterior elements are intact.  Neural foramina are patent.  No fracture dislocation bone destruction seen.  There is mild DJD.     Impression:     Mild DJD.    X-ray lumbar spine 6/1/2016:  2 views: Alignment is normal. There is mild DJD. No fracture dislocation bone destruction seen.   Impression      Mild DJD.     Xray lumbar spine 4/2018:  COMPARISON:  June 2016    FINDINGS:  There is slight curvature of the lumbar spine.  The vertebral bodies are normally aligned and normal in height.  Mild disc space narrowing present at L5-S1.  There is mild facet degenerative change in the lower spine.  No significant osteophytic spurring present.  There is no change in alignment with flexion or extension.      Past Medical History:   Diagnosis Date    Abnormal Pap smear     Acid reflux     Allergy     Arthritis     Encounter for blood transfusion     GERD (gastroesophageal reflux disease)     History of migraine headaches     Hx of colonic polyp     Hypertension     Idiopathic neuropathy 7/20/2012    ILD (interstitial lung disease) 11/6/2013    Iron deficiency anemia 3/18/2014    MRSA carrier     Osteopenia     Pneumonia     Pulmonary fibrosis     Pulmonary hypertension     Raynaud's disease     Scleroderma, diffuse     Sjogren's syndrome     Vitamin D deficiency 11/14/2013     Past Surgical History:   Procedure Laterality Date    24 HOUR IMPEDANCE PH MONITORING OF ESOPHAGUS IN PATIENT NOT TAKING ACID REDUCING MEDICATIONS N/A 3/4/2020     Procedure: IMPEDANCE PH STUDY, ESOPHAGEAL, 24 HOUR, IN PATIENT NOT TAKING ACID REDUCING MEDICATION;  Surgeon: Annamaria Mendoza MD;  Location: [facility] (4TH FLR);  Service: Endoscopy;  Laterality: N/A;  OFF PPI/H2 Blocker   Motility Studies   Hold Narcotics x 1 days   Hold TCA x 1 days  2/26 - LVM attempting to confirm appt  2/27 - Confirmed appt    ANORECTAL MANOMETRY N/A 5/10/2021    Procedure: MANOMETRY, ANORECTAL with balloon expulsion test;  Surgeon: Annamaria Mendoza MD;  Location: [facility] (4TH FLR);  Service: Endoscopy;  Laterality: N/A;  order combined  covid test 5/7 Conf[facility]ianist, instructions emailed-[facility]    BREAST BIOPSY      Left, benign    CERVICAL CONIZATION   W/ LASER  1970    COLONOSCOPY      COLONOSCOPY N/A 3/29/2019    Procedure: COLONOSCOPY;  Surgeon: Annamaria Mendoza MD;  Location: [facility] (2ND FLR);  Service: Endoscopy;  Laterality: N/A;    COLONOSCOPY N/A 5/10/2021    Procedure: COLONOSCOPY;  Surgeon: Annamaria Mendoza MD;  Location: [facility] (4TH FLR);  Service: Endoscopy;  Laterality: N/A;  2nd floor-previous scopes done on 2nd floor, gastroparesis  full liquid diet x2 days, clear liquid x1 day prior to procedure  covid test 5/7 Confucianist, instructions emailed-[facility]  5/6 pt confirmed [id number]    DILATION AND CURETTAGE OF UTERUS      ESOPHAGEAL MANOMETRY WITH MEASUREMENT OF IMPEDANCE N/A 3/4/2020    Procedure: MANOMETRY, ESOPHAGUS, WITH IMPEDANCE MEASUREMENT;  Surgeon: Annamaria Mendoza MD;  Location: [facility] (4TH FLR);  Service: Endoscopy;  Laterality: N/A;  OFF PPI/H2 Blocker   Motility Studies   Hold Narcotics x 1 days   Hold TCA x 1 days    ESOPHAGOGASTRODUODENOSCOPY      ESOPHAGOGASTRODUODENOSCOPY N/A 3/29/2019    Procedure: EGD (ESOPHAGOGASTRODUODENOSCOPY);  Surgeon: Annamaria Mendoza MD;  Location: [facility] ENDO (2ND FLR);  Service: Endoscopy;  Laterality: N/A;  pulmonary htn    ESOPHAGOGASTRODUODENOSCOPY N/A 2/2/2021    Procedure: ESOPHAGOGASTRODUODENOSCOPY (EGD);  Surgeon: Annamaria  SARA Mendoza MD;  Location: [facility] ENDO (2ND FLR);  Service: Endoscopy;  Laterality: N/A;  2nd floor gastroparesis  3 days full liquid diet and 1 day clears  covid [facility] 1[facility]30 [facility]    HYSTERECTOMY  1990    FRANDY (AUB, Fibroids), ovaries remain    RIGHT HEART CATHETERIZATION Right 1/5/2021    Procedure: INSERTION, CATHETER, RIGHT HEART;  Surgeon: Aureliano Sy MD;  Location: [facility] CATH LAB;  Service: Cardiology;  Laterality: Right;    VARICOSE VEIN SURGERY       Social History     Socioeconomic History    Marital status:      Spouse name: Lewis    Number of children: 4   Occupational History    Occupation: -retired     Employer: [employer]     Comment: [place] [employer]     Employer: RETIRED   Tobacco Use    Smoking status: Never Smoker    Smokeless tobacco: Never Used   Substance and Sexual Activity    Alcohol use: Yes     Alcohol/week: 0.0 standard drinks     Comment: occasionally    Drug use: No    Sexual activity: Yes     Partners: Male     Birth control/protection: None   Other Topics Concern    Are you pregnant or think you may be? No    Breast-feeding No   Social History Narrative         Social Determinants of Health     Financial Resource Strain: Low Risk     Difficulty of Paying Living Expenses: Not very hard   Food Insecurity: No Food Insecurity    Worried About Running Out of Food in the Last Year: Never true    Ran Out of Food in the Last Year: Never true   Transportation Needs: No Transportation Needs    Lack of Transportation (Medical): No    Lack of Transportation (Non-Medical): No   Physical Activity: Insufficiently Active    Days of Exercise per Week: 2 days    Minutes of Exercise per Session: 10 min   Stress: No Stress Concern Present    Feeling of Stress : Only a little   Social Connections: Moderately Isolated    Frequency of Communication with Friends and Family: More than three times a week     Frequency of Social Gatherings with Friends and Family: Once a week    Attends Cheondoism Services: Never    Active Member of Clubs or Organizations: No    Attends Club or Organization Meetings: Never    Marital Status:    Housing Stability: Low Risk     Unable to Pay for Housing in the Last Year: No    Number of Places Lived in the Last Year: 1    Unstable Housing in the Last Year: No     Family History   Problem Relation Age of Onset    Breast cancer Mother     Hypertension Father     No Known Problems Daughter     No Known Problems Son     No Known Problems Daughter     No Known Problems Son     Breast cancer Sister     Diabetes Sister     Breast cancer Maternal Aunt     Osteoarthritis Brother     Diabetes Brother     Melanoma Neg Hx     Colon cancer Neg Hx     Crohn's disease Neg Hx     Stomach cancer Neg Hx     Ulcerative colitis Neg Hx     Rectal cancer Neg Hx     Irritable bowel syndrome Neg Hx     Esophageal cancer Neg Hx     Celiac disease Neg Hx     Ovarian cancer Neg Hx     Liver cancer Neg Hx     Pancreatic cancer Neg Hx        Review of patient's allergies indicates:   Allergen Reactions    Sulfa (sulfonamide antibiotics)      Other reaction(s): Rash       Current Outpatient Medications   Medication Sig    acetaminophen-codeine 300-30mg (TYLENOL #3) 300-30 mg Tab Take 1 tablet by mouth daily as needed (pain). (Patient not taking: Reported on 1/3/2022)    albuterol (VENTOLIN HFA) 90 mcg/actuation inhaler Inhale 2 puffs into the lungs every 4 (four) hours as needed for Wheezing. Rescue (Patient not taking: Reported on 1/3/2022)    benzonatate (TESSALON) 100 MG capsule Take 100 mg by mouth 3 (three) times daily.    cadexomer iodine (IODOSORB) 0.9 % gel Apply topically daily as needed for Wound Care. (Patient not taking: Reported on 1/3/2022)    carboxymethylcellulose sodium (REFRESH TEARS) 0.5 % Drop Apply 1-2 drops to every as needed    ergocalciferol  (ERGOCALCIFEROL) 50,000 unit Cap TAKE 1 CAPSULE BY MOUTH EVERY 7 DAYS    ferrous sulfate 325 (65 FE) MG EC tablet Take 325 mg by mouth 2 (two) times daily.    multivitamin capsule Take 1 capsule by mouth once daily.    mycophenolate mofetil (CELLCEPT) 200 mg/mL SusR Take 5 mLs (1,000 mg total) by mouth 2 (two) times daily.    nabumetone (RELAFEN) 750 MG tablet Take 1 tablet (750 mg total) by mouth 2 (two) times daily as needed for Pain.    NIFEdipine (ADALAT CC) 90 MG TbSR TAKE 1 TABLET(90 MG) BY MOUTH EVERY DAY    NIFEdipine (PROCARDIA-XL) 30 MG (OSM) 24 hr tablet Take 1 tablet (30 mg total) by mouth once daily.    pravastatin (PRAVACHOL) 20 MG tablet TAKE 1 TABLET(20 MG) BY MOUTH EVERY EVENING    pregabalin (LYRICA) 300 MG Cap Take 1 capsule (300 mg total) by mouth 2 (two) times daily.    PREVIDENT 5000 SENSITIVE 1.1-5 % Pste BRUSH FOR 2 MINUTES TWICE PER DAY    RABEprazole (ACIPHEX) 20 mg tablet Take 1 tablet (20 mg total) by mouth 2 (two) times daily.    sodium chloride 0.9% 0.9 % irrigation Irrigate with 2 mLs as directed daily as needed (wound cleaning). (Patient not taking: Reported on 1/3/2022)    tadalafil (ADCIRCA) 20 mg Tab Take 2 tablets (40 mg total) by mouth once daily.    tiZANidine (ZANAFLEX) 4 MG tablet Take 1 tablet (4 mg total) by mouth nightly as needed (muscle pain).     No current facility-administered medications for this visit.       REVIEW OF SYSTEMS:    GENERAL:  No weight loss, malaise or fevers.  HEENT:   No recent changes in vision or hearing  NECK:  Negative for lumps,  difficulty with swallowing associated with scleroderma.  RESPIRATORY:  Negative for cough, wheezing or shortness of breath, patient denies any recent URI. Albuterol (history of ILD)  CARDIOVASCULAR:  Negative for chest pain, leg swelling or palpitations.  GI:  Negative for abdominal discomfort, blood in stools or black stools or change in bowel habits. GERD controlled zantac  MUSCULOSKELETAL:  See  HPI.  SKIN:   Chronic low healing venous stasis ulcerations to bilateral lower extremities   PSYCH:  No mood disorder or recent psychosocial stressors.  Patients sleep is not disturbed secondary to pain.  HEMATOLOGY/LYMPHOLOGY:   History of iron deficiency anemia, takes daily iron supplementation.    ENDO: No history of diabetes or thyroid dysfunction  NEURO:   No history of headaches, syncope, paralysis, seizures or tremors.  All other reviewed and negative other than HPI.    OBJECTIVE:    Exam limited due to virtual visit:  GENERAL: Well appearing, in no acute distress, alert and oriented x3.  PSYCH:  Mood and affect appropriate.    Previous physical exam:  There were no vitals taken for this visit.    PHYSICAL EXAMINATION:    GENERAL: Well appearing, in no acute distress, alert and oriented x3.  PSYCH:  Mood and affect appropriate.  SKIN: Tight skin associated with scleroderma. Wearing shoes today.   HEAD/FACE:  Normocephalic, atraumatic. Cranial nerves grossly intact.  NECK: There is mild pain with palpation over trapezius muscle bilaterally. There is no pain with palpation over cervical facet joints. Limited ROM with mild pain on extension and lateral rotation.    CV: RRR with palpation of the radial artery.  PULM: No evidence of respiratory difficulty, symmetric chest rise.  EXTREMITIES: Contractures over on bilateral hands with ulcerations to knuckles, right greater than left.   MUSCULOSKELETAL:   Bilateral lower extremity strength testing limited secondary to pain in the feet.    NEURO: Bilateral lower extremity coordination and muscle stretch reflexes are physiologic and symmetric. Decreased sensation to BLE. Plantar response are downgoing. No clonus.  No loss of sensation is noted.  GAIT: Antalgic, ambulates without assistance         Lab Results   Component Value Date    WBC 5.06 01/03/2022    HGB 12.3 01/03/2022    HCT 39.7 01/03/2022    MCV 97 01/03/2022     01/03/2022     BMP  Lab Results    Component Value Date     01/03/2022    K 4.1 01/03/2022     01/03/2022    CO2 25 01/03/2022    BUN 18 01/03/2022    CREATININE 0.7 01/03/2022    CALCIUM 9.7 01/03/2022    ANIONGAP 7 (L) 01/03/2022    ESTGFRAFRICA >60.0 01/03/2022    EGFRNONAA >60.0 01/03/2022         ASSESSMENT: 71 y.o. year old female with pain, consistent with     Encounter Diagnoses   Name Primary?    Chronic pain disorder Yes    Cutaneous scleroderma     Idiopathic neuropathy     Myofascial pain     Cervical spondylosis     DDD (degenerative disc disease), cervical        PLAN:     - We discussed that Dr. King has left [facility]. I will have her establish care with Dr. Hinson.     - Previous imaging was reviewed and discussed with the patient today.    - Consider cervical facet joint injections in the future.     - Continue home exercise routine.     - Cotinue Tylenol #3 once daily PRN.     - Continue Lyrica 300 mg BID, refill provided.     - Continue Relafen.     - Discontinue Zanaflex. Trial Flexeril 5 mg TID PRN muscle pain.     - Continue to follow up with wound care. Continue follow up with Vascular.     - RTC in 1 month.     The above plan and management options were discussed at length with patient. Patient is in agreement with the above and verbalized understanding.     Viktoriya Caamra NP  01/04/2022

## 2022-01-06 ENCOUNTER — CLINICAL SUPPORT (OUTPATIENT)
Dept: REHABILITATION | Facility: HOSPITAL | Age: 72
End: 2022-01-06
Attending: INTERNAL MEDICINE
Payer: MEDICARE

## 2022-01-06 ENCOUNTER — TELEPHONE (OUTPATIENT)
Dept: TRANSPLANT | Facility: CLINIC | Age: 72
End: 2022-01-06
Payer: MEDICARE

## 2022-01-06 DIAGNOSIS — M62.89 PELVIC FLOOR DYSFUNCTION: Primary | ICD-10-CM

## 2022-01-06 PROCEDURE — 97112 NEUROMUSCULAR REEDUCATION: CPT

## 2022-01-06 NOTE — PROGRESS NOTES
"  Pelvic Health Physical Therapy   Treatment Note     Name: Yamel Shah  Clinic Number: [id number]    Therapy Diagnosis:   Encounter Diagnosis   Name Primary?    Pelvic floor dysfunction Yes     Physician: Annamaria Mendoza MD    Visit Date: 1/6/2022    Physician Orders: PT Eval and Treat    Medical Diagnosis from Referral: Fecal incontinence with incomplete defecation [R15.9, R15.0]  Evaluation Date: 11/10/2021  Authorization Period Expiration: 12/31/2021  Plan of Care Expiration: 2/10/2022  Visit # / Visits authorized: 4/ 20    Cancelled Visits: 0  No Show Visits: 0    Time In: 12:08  Time Out: 12:48  Total Billable Time: 40 minutes    Precautions: Standard    Subjective     Pt reports: went to [place] for the holidays and got out of her exercise routine.  Urine leakage is "very very little".  Still having fecal smudges now and then but overall better.    She was compliant with home exercise program.  Response to previous treatment: no adverse effects  Functional change: less urinary leakage during the day, and less fecal leakage.      Pain: 0/10      Objective     Yamel participated in neuromuscular re-education activities to develop Coordination, Control and Down training for 40 minutes including: Kegels with assist of SEMG and abdominal sets  We worked in supine, and standing with external lead wires.  She demonstrated normal baseline resting, good WR rise, and fair holding in 10 sec Kegels.  Her derecruitment was delayed but more complete than in previous session.  We then looked at standing Kegels and she demonstrated good WR rise, fair/good holding ability.  Her baseline resting activity was normal in all positions.     Her holding ability in standing Kegels improved with reps.  We spoke about continuing her HEP and her current bowel regimen, and that if in time her symptoms worsen or return she is to follow up with MD for further workup of referral back to PT.  Pt verbalized understanding of " this instruction.      Home Exercises Provided and Patient Education Provided     Education provided:   - posture/body mechanices  Discussed progression of plan of care with patient; educated pt in activity modification; reviewed HEP with pt. Pt demonstrated and verbalized understanding of all instruction and was provided with a handout of HEP (see Patient Instructions).    Written Home Exercises Provided: yes.  Exercises were reviewed and Yamel was able to demonstrate them prior to the end of the session.  Yamel demonstrated good  understanding of the education provided.     See EMR under Patient Instructions for exercises provided prior sessions.    Assessment     Pt is reporting improvement in both urinary and fecal leakage, and is having regular BM's.  She will continue her home program I'ly.  No further need for PT services at this time.          Yamel is progressing well towards her goals.    Pt prognosis is Good.     Pt will continue to benefit from skilled outpatient physical therapy to address the deficits listed in the problem list box on initial evaluation, provide pt/family education and to maximize pt's level of independence in the home and community environment.     Pt's spiritual, cultural and educational needs considered and pt agreeable to plan of care and goals.     Anticipated barriers to physical therapy: none    Goals: 12 weeks   Pt will report improved ability to perform ADLs (ie. dressing, bathing, functional transfers) with little (drops) to no urinary leakage 7/7 days per week.MET  Pt will verbalize improved awareness of PFM activity as palpated by PT in order to improve activity involvement with HEP.MET  Pt will report improved ability to perform iADLs (ie. driving, home chores, grocery shopping) with little (drops) to no urinary leakage 7/7 days per week. MET  Pt will report improved ability to perform ADLs (ie. dressing, bathing, functional transfers) with little (drops) to no  fecal leakage 7/7 days per week. MET  Pt will report improved ability to perform iADLs (ie. driving, home chores, grocery shopping) with little (drops) to no fecal leakage 7/7 days per week. MET  Pt/family will be independent with HEP for continued self-management of symptoms.MET      Plan     D/c PT    Tracy Maya, PT, BCB-PMD

## 2022-01-06 NOTE — TELEPHONE ENCOUNTER
Contacted patient regarding the pulmonary / ALD consult from Dr. Wilson to Dr. Leigh.  Patient stated that she was aware of the referral.  Notified her of clinic availability.  Patient requested an appointment in February.  Informed patient that the schedule for the providers is not finalized for February.  Informed her that I will contact her once the February schedule is finalized.  She verbalized her understanding.    ----- Message from Cady Manzano MD sent at 1/6/2022  9:49 AM CST -----  Regarding: RE: Request for appointment  Just a 6MWT     Thanks the info you provided is helpful    ----- Message -----  From: Sammie Corcoran RN  Sent: 1/5/2022   8:27 PM CST  To: Magaly León RN, Cady Manzano MD  Subject: RE: Request for appointment                      What testing do you want ordered?  She had spirometry only on 1/3/22.  Last complete PFTs were done in May 2021.    CT of chest 12/20/20  6 min walk 7/6/21  Echo 12/29/21      ----- Message -----  From: Cady Manzano MD  Sent: 1/5/2022  12:53 PM CST  To: Magaly León RN, Sammie Corcoran RN  Subject: RE: Request for appointment                      Since Roberta has seen this patient we can continue seeing this patient in our clinic for now.        ----- Message -----  From: Magaly León RN  Sent: 1/5/2022  10:07 AM CST  To: Sammie Corcoran RN, Cady Manzano MD  Subject: RE: Request for appointment                      Cady,  The patient has been scheduled to see Dr. Merlos on 1/26.  Do you want her to just keep this pulmonary appt? Or do you want her scheduled with either you or Roberta?    ----- Message -----  From: Cady Manzano MD  Sent: 1/4/2022  12:12 PM CST  To: Magaly León RN, Sammie Corcoran RN, #  Subject: RE: Request for appointment                      Hey there Dr. Wilson    I have included my coordinators on this thread.  In the future, if the patient has been seen by us you can just have them call  up our clinic to make a follow up appt.  If its a new patient, I do appreciate a bit a of background similar to this message and you can put it a referral to [facility].      Thanks   [facility]   ----- Message -----  From: Jessica Wilson MD  Sent: 1/3/2022   6:34 PM CST  To: Roberta Leigh, DO  Subject: Request for appointment                          Dr. Leigh,  I wanted to reach out to you for this mutual patient. She is currently scheduled to in general pulmonology on 1/26/22. My staff Dr. Ahuja asked me to reach out to see if she would be able to see you in clinic as she had previously established with you in 2020 and especially given your speciality in ILD associated with connective tissue disease. He believes that she may be a good candidate to consider a antifibrotic agent.    Thanks,    Jessica Wilson PGY 4  Rheumatology

## 2022-01-14 ENCOUNTER — TELEPHONE (OUTPATIENT)
Dept: GASTROENTEROLOGY | Facility: CLINIC | Age: 72
End: 2022-01-14
Payer: MEDICARE

## 2022-01-14 NOTE — TELEPHONE ENCOUNTER
Called pt to marce fu visit in feb.  Pt requested  Fu visit in march.  Explained when march schedule opens, I will call her to schedule.  Pt agreed

## 2022-01-19 ENCOUNTER — TELEPHONE (OUTPATIENT)
Dept: TRANSPLANT | Facility: CLINIC | Age: 72
End: 2022-01-19
Payer: MEDICARE

## 2022-01-19 DIAGNOSIS — M34.9 SCLERODERMA WITH PULMONARY INVOLVEMENT: ICD-10-CM

## 2022-01-19 DIAGNOSIS — I27.29 PULMONARY HYPERTENSION ASSOCIATED WITH SYSTEMIC DISORDER: Primary | ICD-10-CM

## 2022-01-19 DIAGNOSIS — J84.9 ILD (INTERSTITIAL LUNG DISEASE): ICD-10-CM

## 2022-01-19 NOTE — TELEPHONE ENCOUNTER
Contacted patient to schedule an appointment per Rheumatology's request.  Informed patient that we have the February schedule.  Notified her of our clinic availability and the testing that is needed (6 minute walk test).  Patient requested an appointment with Dr. Leigh since she has seen her in the past.  Notified her of Dr. Leigh's schedule.  Patient requested an appointment on 3/15/22.  She also requested that I cancel the pulmonary appointment with Dr. Merlos since it is scheduled in [place] and since she will be seeing Dr. Leigh.    Appointment with Dr. Merlos canceled per patient's request.

## 2022-01-20 NOTE — PROGRESS NOTES
Digital Medicine: Clinician Follow-Up    Called patient for digital medicine follow-up patient reports doing well overall. Blood pressure readings were elevated and nifedipine dose was increased. Patient is tolerating with no complaints. Attributes spikes to pain.    The history is provided by the patient.      Review of patient's allergies indicates:   -- Sulfa (sulfonamide antibiotics)     --  Other reaction(s): Rash   -- Tramadol -- Itching  Follow-up reason(s): routine follow up.     Hypertension    Patient's blood pressure readings are labile.   Patient is not experiencing signs/symptoms of hypotension.  Patient is not experiencing signs/symptoms of hypertension.         Last 5 Patient Entered Readings                Current 30 Day Average: 155/65  Recent Readings 1/14/2022 12/20/2021 12/6/2021 11/29/2021 11/23/2021   SBP (mmHg) 155 120 126 139 145   DBP (mmHg) 65 57 64 62 68   Pulse 77 72 73 76 67                    Hypertension Medications             NIFEdipine (ADALAT CC) 90 MG TbSR TAKE 1 TABLET(90 MG) BY MOUTH EVERY DAY    NIFEdipine (PROCARDIA-XL) 30 MG (OSM) 24 hr tablet Take 1 tablet (30 mg total) by mouth once daily.                            ASSESSMENT(S)  Patients BP average is 135/64 mmHg, which is above goal. Patient's BP goal is less than or equal to 130/80.         Hypertension Plan  Additional monitoring needed.  Continue current therapy.  Encouraged patient to avoid checking blood pressure when in moderate to severe pain.      Addressed patient questions and patient has my contact information if needed prior to next outreach. Patient verbalizes understanding.            There are no preventive care reminders to display for this patient.

## 2022-01-24 ENCOUNTER — OFFICE VISIT (OUTPATIENT)
Dept: URGENT CARE | Facility: CLINIC | Age: 72
End: 2022-01-24
Payer: MEDICARE

## 2022-01-24 ENCOUNTER — SPECIALTY PHARMACY (OUTPATIENT)
Dept: PHARMACY | Facility: CLINIC | Age: 72
End: 2022-01-24
Payer: MEDICARE

## 2022-01-24 VITALS
RESPIRATION RATE: 18 BRPM | BODY MASS INDEX: 25.99 KG/M2 | DIASTOLIC BLOOD PRESSURE: 55 MMHG | TEMPERATURE: 98 F | SYSTOLIC BLOOD PRESSURE: 140 MMHG | WEIGHT: 156 LBS | OXYGEN SATURATION: 99 % | HEIGHT: 65 IN | HEART RATE: 78 BPM

## 2022-01-24 DIAGNOSIS — R09.81 NASAL CONGESTION: ICD-10-CM

## 2022-01-24 DIAGNOSIS — R05.9 COUGH: Primary | ICD-10-CM

## 2022-01-24 DIAGNOSIS — U07.1 COVID-19: ICD-10-CM

## 2022-01-24 LAB
CTP QC/QA: YES
SARS-COV-2 RDRP RESP QL NAA+PROBE: POSITIVE

## 2022-01-24 PROCEDURE — U0002 COVID-19 LAB TEST NON-CDC: HCPCS | Mod: QW,CR,S$GLB,

## 2022-01-24 PROCEDURE — U0002: ICD-10-PCS | Mod: QW,CR,S$GLB,

## 2022-01-24 PROCEDURE — 99213 PR OFFICE/OUTPT VISIT, EST, LEVL III, 20-29 MIN: ICD-10-PCS | Mod: CR,S$GLB,,

## 2022-01-24 PROCEDURE — 99213 OFFICE O/P EST LOW 20 MIN: CPT | Mod: CR,S$GLB,,

## 2022-01-24 RX ORDER — LORATADINE 10 MG/1
10 TABLET ORAL DAILY
Qty: 30 TABLET | Refills: 11 | Status: SHIPPED | OUTPATIENT
Start: 2022-01-24 | End: 2022-09-12

## 2022-01-24 RX ORDER — BENZONATATE 200 MG/1
200 CAPSULE ORAL 3 TIMES DAILY PRN
Qty: 30 CAPSULE | Refills: 0 | Status: SHIPPED | OUTPATIENT
Start: 2022-01-24 | End: 2022-02-03

## 2022-01-24 NOTE — TELEPHONE ENCOUNTER
Patient returned [facility]'s call for Cellcept refill. Patient reports that she does not want to set up refill at this time as she just tested positive for COVID-19 as of today (confirmed in chart). Advised patient to hold Cellcept until infection resolves and RPh will follow up in a week to assess infection status and determine if Cellcept is appropriate to refill at that time. Patient verbalized understanding.

## 2022-01-24 NOTE — PROGRESS NOTES
"Subjective:       Patient ID: Yamel Shah is a 71 y.o. female.    Vitals:  height is 5' 5" (1.651 m) and weight is 70.8 kg (156 lb). Her temperature is 97.6 °F (36.4 °C). Her blood pressure is 140/55 (abnormal) and her pulse is 78. Her respiration is 18 and oxygen saturation is 99%.     Chief Complaint: Cough (Sore throat, runny nose - Entered by patient)    PT REPORTS FULLY COVID VACCINATED, NO BOOSTER. NO KNOWN RECENT EXPOSURE TO COVID. Symptoms started Saturday.She has been using flonase and robitussin for symptoms which has helped. She is having nasal congestion. Denies fever, body aches or chills. She has also been having diarrhea for the last 2 days. She has been having loose stools, sometimes watery. No blood. She had about 6 bouts of diarrhea yesterday. She had 2 normal bowel movements today. She states she is keeping up with fluids.     Cough  This is a new problem. The current episode started in the past 7 days (X2 DAYS). The problem has been unchanged. The problem occurs every few hours. The cough is productive of sputum. Associated symptoms include chills, nasal congestion, rhinorrhea and a sore throat. Pertinent negatives include no chest pain, ear pain, eye redness, fever, headaches, myalgias, rash or shortness of breath. Nothing aggravates the symptoms. She has tried OTC cough suppressant (ROBITUSSIN AND FLONASE) for the symptoms. The treatment provided no relief.       Constitution: Positive for chills. Negative for fever.   HENT: Positive for congestion, sinus pain, sinus pressure and sore throat. Negative for ear pain, tinnitus and hearing loss.    Neck: Negative for neck pain and neck stiffness.   Cardiovascular: Negative for chest pain.   Eyes: Negative for eye pain and eye redness.   Respiratory: Positive for cough. Negative for shortness of breath.    Gastrointestinal: Positive for diarrhea. Negative for nausea, vomiting and constipation.   Musculoskeletal: Negative for muscle " ache.   Skin: Negative for color change, pale and rash.   Allergic/Immunologic: Negative for itching and sneezing.   Neurological: Negative for headaches, disorientation and altered mental status.   Psychiatric/Behavioral: Negative for altered mental status, disorientation and confusion.       Objective:      Physical Exam   Constitutional: She is oriented to person, place, and time. She appears well-developed and well-nourished. She is cooperative.  Non-toxic appearance. She does not have a sickly appearance. She does not appear ill. No distress.      Comments:Patient sits comfortably in exam chair. Answers questions in complete sentences. Does not show any signs of distress or discoloration.      HENT:   Head: Normocephalic and atraumatic.   Ears:   Right Ear: Hearing, tympanic membrane, external ear and ear canal normal.   Left Ear: Hearing, tympanic membrane, external ear and ear canal normal.   Nose: Rhinorrhea and congestion present. No mucosal edema or nasal deformity. No epistaxis. Right sinus exhibits no maxillary sinus tenderness and no frontal sinus tenderness. Left sinus exhibits no maxillary sinus tenderness and no frontal sinus tenderness.   Mouth/Throat: Uvula is midline, oropharynx is clear and moist and mucous membranes are normal. No trismus in the jaw. Normal dentition. No uvula swelling. No oropharyngeal exudate, posterior oropharyngeal edema or posterior oropharyngeal erythema.   Eyes: Conjunctivae and lids are normal. No scleral icterus.   Neck: Trachea normal and phonation normal. Neck supple. No edema present. No erythema present. No neck rigidity present.   Cardiovascular: Normal rate, regular rhythm, normal heart sounds, intact distal pulses and normal pulses.   Pulmonary/Chest: Effort normal and breath sounds normal. No stridor. No respiratory distress. She has no decreased breath sounds. She has no wheezes. She has no rhonchi. She has no rales.   Abdominal: Normal appearance.    Musculoskeletal: Normal range of motion.         General: No deformity or edema. Normal range of motion.   Neurological: She is alert and oriented to person, place, and time. She exhibits normal muscle tone. Coordination normal.   Skin: Skin is warm, dry, intact, not diaphoretic and not pale.   Psychiatric: She has a normal mood and affect. Her speech is normal and behavior is normal. Judgment and thought content normal. Cognition and memory  Nursing note and vitals reviewed.        Results for orders placed or performed in visit on 01/24/22   POCT COVID-19 Rapid Screening   Result Value Ref Range    POC Rapid COVID Positive (A) Negative     Acceptable Yes      *Note: Due to a large number of results and/or encounters for the requested time period, some results have not been displayed. A complete set of results can be found in Results Review.     4     Assessment:       1. Cough    2. Nasal congestion    3. COVID-19          Plan:         Cough  -     POCT COVID-19 Rapid Screening  -     benzonatate (TESSALON) 200 MG capsule; Take 1 capsule (200 mg total) by mouth 3 (three) times daily as needed for Cough.  Dispense: 30 capsule; Refill: 0    Nasal congestion  -     loratadine (CLARITIN) 10 mg tablet; Take 1 tablet (10 mg total) by mouth once daily.  Dispense: 30 tablet; Refill: 11    COVID-19  -     Ambulatory referral/consult to [facility]  -     COVID-19 Home Symptom Monitoring  - Duration (days): 14                 Patient Instructions   - Rest.    - Drink plenty of fluids.    - Acetaminophen (tylenol) or Ibuprofen (advil,motrin) as directed as needed for fever/pain. Avoid tylenol if you have a history of liver disease. Do not take ibuprofen if you have a history of GI bleeding, kidney disease, or if you take blood thinners.     - You must understand that you have received an Urgent Care treatment only and that you may be released before all of your medical problems are known or treated.   -  You, the patient, will arrange for follow up care as instructed.   - If your condition worsens or fails to improve we recommend that you receive another evaluation at the ER immediately or contact your PCP to discuss your concerns or return here.   - Follow up with your PCP or specialty clinic as directed in the next 1-2 weeks if not improved or as needed.  You can call [phone] to schedule an appointment with the appropriate provider.      If your symptoms do not improve or worsen, go to the emergency room immediately.    Patient Education       [place] Diet   About this topic   A bland diet is made up of foods which are soft, not spicy, cooked, low in fat, and low in fiber. These foods are not likely to upset the GI tract.  What will the results be?   This diet will not make your stomach upset. This diet will help you get nutrients while you do not feel well.  What changes to diet are needed?   · Eat small meals more often during the day.  · Avoid large meals.  · Stay away from spicy or seasoned foods or other foods that should be avoided.  · Stay away from fatty foods, like fried foods and high-fat dairy products.  · Eat slowly and chew your food carefully.  · Drink fluids slowly.  · Do not eat for at least 2 hours before going to bed.  Who should use this diet?   People with ulcers, heartburn, upset stomach, gas, loose stools, or throwing up should eat a bland diet.  What foods are good to eat?   · Low-fat milk and other dairy products  · Lactose-free products, like soy milk  · Cooked, canned, or frozen vegetables  · Fruit and vegetable juices without pulp. Dilute fruit juices with water if you have a problem with loose stools.  · Cooked or canned fruit with no skin or seeds, like applesauce  · Ripe bananas and melons  · Breads, crackers, and pasta made with white flour  · Hot cereals like cream of rice or cream of wheat  · Lean, tender meats that are steamed, baked, or grilled with no added fat  · Creamy  peanut butter  · Eggs  · Tofu  · Soup and broth  · Drinks without caffeine     What foods should be limited or avoided?   · Avoid any food or drinks that make your symptoms worse  · Full-fat dairy foods, like whole milk  · Raw vegetables  · Strong cheese, like pamela cheese  · Vegetables that can cause gas like broccoli, cabbage, and [place] sprouts  · Any fruits or vegetables that cause heartburn symptoms like tomatoes and citrus fruits  · Fresh fruits (besides ripe bananas and melons), dried fruits, or fruits canned in heavy syrup  · Whole grain or bran cereals, bread, crackers, or pasta  · Fried pastries, such as donuts  · Pickles, sauerkraut, and similar foods  · Spicy foods and seasonings  · Pepper  · Foods with a lot of sugar or honey in them  · Seeds and nuts  · Higher fat cuts of meat, poultry with the skin, hotdogs, salami, and sausage  · Meat or fish that has been seasoned or cured, like sardines and mcclure  · Fried foods  · Chocolate  · Beer, wine, and mixed drinks (alcohol)  · Peppermint and spearmint flavored foods and drinks  · Drinks with caffeine  When do I need to call the doctor?   · You are not feeling better in 2 to 3 days or you are feeling worse  · If you have questions about your diet  · Signs of fluid loss. These include dark-colored urine or no urine for more than 8 hours, dry mouth, cracked lips, sunken eyes, lack of energy, feeling faint, or passing out.  Last Reviewed Date   2021-03-12  Consumer Information Use and Disclaimer   This information is not specific medical advice and does not replace information you receive from your health care provider. This is only a brief summary of general information. It does NOT include all information about conditions, illnesses, injuries, tests, procedures, treatments, therapies, discharge instructions or life-style choices that may apply to you. You must talk with your health care provider for complete information about your health and treatment  options. This information should not be used to decide whether or not to accept your health care providers advice, instructions or recommendations. Only your health care provider has the knowledge and training to provide advice that is right for you.  Copyright   Copyright © 2021 [employer] and its affiliates and/or licensors. All rights reserved.  Patient Education       COVID-19 Discharge Instructions   About this topic   Coronavirus disease 2019 is also known as COVID-19. It is a viral illness that infects the lungs. It is caused by a virus called SARS-associated coronavirus (SARS-CoV-2).  The signs of COVID-19 most often start a few days after you have been infected. In some people, it takes longer to show signs. Others never show signs of the infection. You may have a cough, fever, shaking chills and it may be hard to breathe. You may be very tired, have muscle aches, a headache or sore throat. Some people have an upset stomach or loose stools. Others lose their sense of smell or taste. You may not have these signs all the time and they may come and go while you are sick.  The virus spreads easily through droplets when you talk, sneeze, or cough. You can pass the virus to others when you are talking close together, singing, hugging, sharing food, or shaking hands. Doctors believe the germs also survive on surfaces like tables, door handles, and telephones. However, this is not a common way that COVID-19 spreads. Doctors believe you can also spread the infection even if you dont have any symptoms, but they do not know how that happens. This is why getting vaccinated is one of the best ways to keep you healthy and slow the spread of the virus.  Some people have a mild case of COVID-19 and are able to stay at home and away from others until they feel better. Others may need to be in the hospital if they are very sick. Some people with COVID-19 can have some symptoms for weeks or months. People with COVID-19  must isolate themselves. You can start to be around others when your doctor says it is safe to do so.       What care is needed at home?   · Ask your doctor what you need to do when you go home. Make sure you ask questions if you do not understand what the doctor says.  · Drink lots of water, juice, or broth to replace fluids lost from a fever.  · You may use cool mist humidifiers to help ease congestion and coughing.  · Use 2 to 3 pillows to prop yourself up when you lie down to make it easier to breathe and sleep.  · Do not smoke and do not drink beer, wine, and mixed drinks (alcohol).  · To lower the chance of passing the infection to others, get a COVID-19 vaccine after your infection has resolved.  · If you have not been fully vaccinated:  ? Wear a mask over your mouth and nose if you are around others who are not sick. Cloth masks work best if they have more than one layer of fabric.  ? Wash your hands often.  ? Stay home in a separate room, if possible, away from others. Only go out to get medical care.  ? Use a separate bathroom if possible.  ? Do not make food for others.  What follow-up care is needed?   · Your doctor may ask you to make visits to the office to check on your progress. Be sure to keep these visits. Make sure you wear a mask at these visits.  · If you can, tell the staff you have COVID-19 ahead of time so they can take extra care to stop the disease from spreading.  · It may take a few weeks before your health returns to normal.  What drugs may be needed?   The doctor may order drugs to:  · Help with breathing  · Help with fever  · Help with swelling in your airways and lungs  · Control coughing  · Ease a sore throat  · Help a runny or stuffy nose  Will physical activity be limited?   You may have to limit your physical activity. Talk to your doctor about the right amount of activity for you. If you have been very sick with COVID-19, it can take some time to get your strength back.  Will  there be any other care needed?   Doctors do not know how long you can pass the virus on to others after you are sick. This is why it is important to stay in a separate room, if possible, when you are sick. For now, doctors are giving general guidelines for you to follow after you have been sick. Before you go around other people, you should:  · Be fever free for 24 hours without taking any drugs to lower the fever  · Have no symptoms of cough or shortness of breath  · Wait at least 10 days after first having symptoms or your first positive test, and you need to be symptom free as above. Some experts suggest waiting 20 days if you have had a more severe infection.  Talk with your doctor about getting a COVID-19 vaccine.  What problems could happen?   · Fluid loss. This is dehydration.  · Short-term or long-term lung damage  · Heart problems  · Death  When do I need to call the doctor?   · You are having so much trouble breathing that you can only say one or two words at a time.  · You need to sit upright at all times to be able to breathe and/or cannot lie down.  · You are very confused or cannot stay awake.  · Your lips or skin start to turn blue or grey.  · You think you might be having a medical emergency. Some examples of medical emergencies are:  ? Severe chest pain.  ? Not able to speak or move normally.  · You have trouble breathing when talking or sitting still.  · You have new shortness of breath.  · You become weak or dizzy.  · You have very dark urine or do not pass urine for more than 8 hours.  · You have new or worsening COVID-19 symptoms like:  ? Fever  ? Cough  ? Feeling very tired  ? Shaking chills  ? Headache  ? Trouble swallowing  ? Throwing up  ? Loose stools  ? Reddish purple spots on your fingers or toes  Teach Back: Helping You Understand   The Teach Back Method helps you understand the information we are giving you. After you talk with the staff, tell them in your own words what you learned.  This helps to make sure the staff has described each thing clearly. It also helps to explain things that may have been confusing. Before going home, make sure you can do these:  · I can tell you about my condition.  · I can tell you what may help ease my breathing.  · I can tell you what I can do to help avoid passing the infection to others.  · I can tell you what I will do if I have trouble breathing; feel sleepy or confused; or my fingertips, fingernails, skin, or lips are blue.  Where can I learn more?   Centers for Disease Control and Prevention  https://www.cdc.gov/coronavirus/2019-ncov/about/index.html   Centers for Disease Control and Prevention  https://www.cdc.gov/coronavirus/2019-ncov/hcp/disposition-in-home-patients.html   World Health Organization  https://www.who.int/news-room/q-a-detail/g-s-biiysirpcsmdp   Last Reviewed Date   2021-10-05  Consumer Information Use and Disclaimer   This information is not specific medical advice and does not replace information you receive from your health care provider. This is only a brief summary of general information. It does NOT include all information about conditions, illnesses, injuries, tests, procedures, treatments, therapies, discharge instructions or life-style choices that may apply to you. You must talk with your health care provider for complete information about your health and treatment options. This information should not be used to decide whether or not to accept your health care providers advice, instructions or recommendations. Only your health care provider has the knowledge and training to provide advice that is right for you.  Copyright   Copyright © 2021 [employer] and its affiliates and/or licensors. All rights reserved.

## 2022-01-24 NOTE — PATIENT INSTRUCTIONS
- Rest.    - Drink plenty of fluids.    - Acetaminophen (tylenol) or Ibuprofen (advil,motrin) as directed as needed for fever/pain. Avoid tylenol if you have a history of liver disease. Do not take ibuprofen if you have a history of GI bleeding, kidney disease, or if you take blood thinners.     - You must understand that you have received an Urgent Care treatment only and that you may be released before all of your medical problems are known or treated.   - You, the patient, will arrange for follow up care as instructed.   - If your condition worsens or fails to improve we recommend that you receive another evaluation at the ER immediately or contact your PCP to discuss your concerns or return here.   - Follow up with your PCP or specialty clinic as directed in the next 1-2 weeks if not improved or as needed.  You can call [phone] to schedule an appointment with the appropriate provider.      If your symptoms do not improve or worsen, go to the emergency room immediately.    Patient Education       [place] Diet   About this topic   A bland diet is made up of foods which are soft, not spicy, cooked, low in fat, and low in fiber. These foods are not likely to upset the GI tract.  What will the results be?   This diet will not make your stomach upset. This diet will help you get nutrients while you do not feel well.  What changes to diet are needed?   · Eat small meals more often during the day.  · Avoid large meals.  · Stay away from spicy or seasoned foods or other foods that should be avoided.  · Stay away from fatty foods, like fried foods and high-fat dairy products.  · Eat slowly and chew your food carefully.  · Drink fluids slowly.  · Do not eat for at least 2 hours before going to bed.  Who should use this diet?   People with ulcers, heartburn, upset stomach, gas, loose stools, or throwing up should eat a bland diet.  What foods are good to eat?   · Low-fat milk and other dairy products  · Lactose-free  products, like soy milk  · Cooked, canned, or frozen vegetables  · Fruit and vegetable juices without pulp. Dilute fruit juices with water if you have a problem with loose stools.  · Cooked or canned fruit with no skin or seeds, like applesauce  · Ripe bananas and melons  · Breads, crackers, and pasta made with white flour  · Hot cereals like cream of rice or cream of wheat  · Lean, tender meats that are steamed, baked, or grilled with no added fat  · Creamy peanut butter  · Eggs  · Tofu  · Soup and broth  · Drinks without caffeine     What foods should be limited or avoided?   · Avoid any food or drinks that make your symptoms worse  · Full-fat dairy foods, like whole milk  · Raw vegetables  · Strong cheese, like pamela cheese  · Vegetables that can cause gas like broccoli, cabbage, and [place] sprouts  · Any fruits or vegetables that cause heartburn symptoms like tomatoes and citrus fruits  · Fresh fruits (besides ripe bananas and melons), dried fruits, or fruits canned in heavy syrup  · Whole grain or bran cereals, bread, crackers, or pasta  · Fried pastries, such as donuts  · Pickles, sauerkraut, and similar foods  · Spicy foods and seasonings  · Pepper  · Foods with a lot of sugar or honey in them  · Seeds and nuts  · Higher fat cuts of meat, poultry with the skin, hotdogs, salami, and sausage  · Meat or fish that has been seasoned or cured, like sardines and mcclure  · Fried foods  · Chocolate  · Beer, wine, and mixed drinks (alcohol)  · Peppermint and spearmint flavored foods and drinks  · Drinks with caffeine  When do I need to call the doctor?   · You are not feeling better in 2 to 3 days or you are feeling worse  · If you have questions about your diet  · Signs of fluid loss. These include dark-colored urine or no urine for more than 8 hours, dry mouth, cracked lips, sunken eyes, lack of energy, feeling faint, or passing out.  Last Reviewed Date   2021-03-12  Consumer Information Use and Disclaimer   This  information is not specific medical advice and does not replace information you receive from your health care provider. This is only a brief summary of general information. It does NOT include all information about conditions, illnesses, injuries, tests, procedures, treatments, therapies, discharge instructions or life-style choices that may apply to you. You must talk with your health care provider for complete information about your health and treatment options. This information should not be used to decide whether or not to accept your health care providers advice, instructions or recommendations. Only your health care provider has the knowledge and training to provide advice that is right for you.  Copyright   Copyright © 2021 [employer] and its affiliates and/or licensors. All rights reserved.  Patient Education       COVID-19 Discharge Instructions   About this topic   Coronavirus disease 2019 is also known as COVID-19. It is a viral illness that infects the lungs. It is caused by a virus called SARS-associated coronavirus (SARS-CoV-2).  The signs of COVID-19 most often start a few days after you have been infected. In some people, it takes longer to show signs. Others never show signs of the infection. You may have a cough, fever, shaking chills and it may be hard to breathe. You may be very tired, have muscle aches, a headache or sore throat. Some people have an upset stomach or loose stools. Others lose their sense of smell or taste. You may not have these signs all the time and they may come and go while you are sick.  The virus spreads easily through droplets when you talk, sneeze, or cough. You can pass the virus to others when you are talking close together, singing, hugging, sharing food, or shaking hands. Doctors believe the germs also survive on surfaces like tables, door handles, and telephones. However, this is not a common way that COVID-19 spreads. Doctors believe you can also spread the  infection even if you dont have any symptoms, but they do not know how that happens. This is why getting vaccinated is one of the best ways to keep you healthy and slow the spread of the virus.  Some people have a mild case of COVID-19 and are able to stay at home and away from others until they feel better. Others may need to be in the hospital if they are very sick. Some people with COVID-19 can have some symptoms for weeks or months. People with COVID-19 must isolate themselves. You can start to be around others when your doctor says it is safe to do so.       What care is needed at home?   · Ask your doctor what you need to do when you go home. Make sure you ask questions if you do not understand what the doctor says.  · Drink lots of water, juice, or broth to replace fluids lost from a fever.  · You may use cool mist humidifiers to help ease congestion and coughing.  · Use 2 to 3 pillows to prop yourself up when you lie down to make it easier to breathe and sleep.  · Do not smoke and do not drink beer, wine, and mixed drinks (alcohol).  · To lower the chance of passing the infection to others, get a COVID-19 vaccine after your infection has resolved.  · If you have not been fully vaccinated:  ? Wear a mask over your mouth and nose if you are around others who are not sick. Cloth masks work best if they have more than one layer of fabric.  ? Wash your hands often.  ? Stay home in a separate room, if possible, away from others. Only go out to get medical care.  ? Use a separate bathroom if possible.  ? Do not make food for others.  What follow-up care is needed?   · Your doctor may ask you to make visits to the office to check on your progress. Be sure to keep these visits. Make sure you wear a mask at these visits.  · If you can, tell the staff you have COVID-19 ahead of time so they can take extra care to stop the disease from spreading.  · It may take a few weeks before your health returns to normal.  What  drugs may be needed?   The doctor may order drugs to:  · Help with breathing  · Help with fever  · Help with swelling in your airways and lungs  · Control coughing  · Ease a sore throat  · Help a runny or stuffy nose  Will physical activity be limited?   You may have to limit your physical activity. Talk to your doctor about the right amount of activity for you. If you have been very sick with COVID-19, it can take some time to get your strength back.  Will there be any other care needed?   Doctors do not know how long you can pass the virus on to others after you are sick. This is why it is important to stay in a separate room, if possible, when you are sick. For now, doctors are giving general guidelines for you to follow after you have been sick. Before you go around other people, you should:  · Be fever free for 24 hours without taking any drugs to lower the fever  · Have no symptoms of cough or shortness of breath  · Wait at least 10 days after first having symptoms or your first positive test, and you need to be symptom free as above. Some experts suggest waiting 20 days if you have had a more severe infection.  Talk with your doctor about getting a COVID-19 vaccine.  What problems could happen?   · Fluid loss. This is dehydration.  · Short-term or long-term lung damage  · Heart problems  · Death  When do I need to call the doctor?   · You are having so much trouble breathing that you can only say one or two words at a time.  · You need to sit upright at all times to be able to breathe and/or cannot lie down.  · You are very confused or cannot stay awake.  · Your lips or skin start to turn blue or grey.  · You think you might be having a medical emergency. Some examples of medical emergencies are:  ? Severe chest pain.  ? Not able to speak or move normally.  · You have trouble breathing when talking or sitting still.  · You have new shortness of breath.  · You become weak or dizzy.  · You have very dark urine  or do not pass urine for more than 8 hours.  · You have new or worsening COVID-19 symptoms like:  ? Fever  ? Cough  ? Feeling very tired  ? Shaking chills  ? Headache  ? Trouble swallowing  ? Throwing up  ? Loose stools  ? Reddish purple spots on your fingers or toes  Teach Back: Helping You Understand   The Teach Back Method helps you understand the information we are giving you. After you talk with the staff, tell them in your own words what you learned. This helps to make sure the staff has described each thing clearly. It also helps to explain things that may have been confusing. Before going home, make sure you can do these:  · I can tell you about my condition.  · I can tell you what may help ease my breathing.  · I can tell you what I can do to help avoid passing the infection to others.  · I can tell you what I will do if I have trouble breathing; feel sleepy or confused; or my fingertips, fingernails, skin, or lips are blue.  Where can I learn more?   Centers for Disease Control and Prevention  https://www.cdc.gov/coronavirus/2019-ncov/about/index.html   Centers for Disease Control and Prevention  https://www.cdc.gov/coronavirus/2019-ncov/hcp/disposition-in-home-patients.html   World Health Organization  https://www.who.int/news-room/q-a-detail/x-h-hkixgpmxykypz   Last Reviewed Date   2021-10-05  Consumer Information Use and Disclaimer   This information is not specific medical advice and does not replace information you receive from your health care provider. This is only a brief summary of general information. It does NOT include all information about conditions, illnesses, injuries, tests, procedures, treatments, therapies, discharge instructions or life-style choices that may apply to you. You must talk with your health care provider for complete information about your health and treatment options. This information should not be used to decide whether or not to accept your health care providers advice,  instructions or recommendations. Only your health care provider has the knowledge and training to provide advice that is right for you.  Copyright   Copyright © 2021 [employer] and its affiliates and/or licensors. All rights reserved.

## 2022-01-25 ENCOUNTER — RESEARCH ENCOUNTER (OUTPATIENT)
Dept: RESEARCH | Facility: HOSPITAL | Age: 72
End: 2022-01-25
Payer: MEDICARE

## 2022-01-25 ENCOUNTER — INFUSION (OUTPATIENT)
Dept: INFECTIOUS DISEASES | Facility: HOSPITAL | Age: 72
End: 2022-01-25
Payer: MEDICARE

## 2022-01-25 VITALS
DIASTOLIC BLOOD PRESSURE: 73 MMHG | SYSTOLIC BLOOD PRESSURE: 167 MMHG | TEMPERATURE: 99 F | WEIGHT: 155 LBS | OXYGEN SATURATION: 95 % | HEART RATE: 73 BPM | BODY MASS INDEX: 25.83 KG/M2 | RESPIRATION RATE: 16 BRPM | HEIGHT: 65 IN

## 2022-01-25 RX ORDER — ACETAMINOPHEN 325 MG/1
650 TABLET ORAL ONCE AS NEEDED
Status: DISCONTINUED | OUTPATIENT
Start: 2022-01-25 | End: 2022-10-04

## 2022-01-25 RX ORDER — EPINEPHRINE 0.3 MG/.3ML
0.3 INJECTION SUBCUTANEOUS
Status: DISCONTINUED | OUTPATIENT
Start: 2022-01-25 | End: 2022-10-04

## 2022-01-25 RX ORDER — SODIUM CHLORIDE 0.9 % (FLUSH) 0.9 %
10 SYRINGE (ML) INJECTION
Status: DISCONTINUED | OUTPATIENT
Start: 2022-01-25 | End: 2022-10-04

## 2022-01-25 RX ORDER — DIPHENHYDRAMINE HYDROCHLORIDE 50 MG/ML
25 INJECTION INTRAMUSCULAR; INTRAVENOUS ONCE AS NEEDED
Status: DISCONTINUED | OUTPATIENT
Start: 2022-01-25 | End: 2022-10-04

## 2022-01-25 RX ORDER — ONDANSETRON 4 MG/1
4 TABLET, ORALLY DISINTEGRATING ORAL ONCE AS NEEDED
Status: DISCONTINUED | OUTPATIENT
Start: 2022-01-25 | End: 2022-10-04

## 2022-01-25 RX ORDER — ALBUTEROL SULFATE 90 UG/1
2 AEROSOL, METERED RESPIRATORY (INHALATION)
Status: DISCONTINUED | OUTPATIENT
Start: 2022-01-25 | End: 2022-10-04

## 2022-01-25 NOTE — PROGRESS NOTES
Patient remains with no signs of complications noted. Patient received sotrovimab infusion with filtered tubing according to FDA recommendations and [facility] S[facility] without complications noted and left with mask in place. Drug fact sheet provided. Pt discharged home. Ambulatory. Remains AAox3. No distress noted. RR even and unlabored.

## 2022-01-25 NOTE — PROGRESS NOTES
Patient arrives for sotrovimab infusion. Ambulatory. Pt AAox3. No distress noted. RR even and unlabored.     Symptoms and onset date:  01/22/22 cough, congestion, headache, diarrhea     Tested COVID + on 01/24/22

## 2022-01-25 NOTE — PROGRESS NOTES
The Patient was called today regarding the patient's participation in (IRB #[id number] PI: Emily).   The Verbal Informed Consent was read and discussed by the consenter. The following was discussed:   Types of specimens to be collected   All medical information released to researchers will be stripped of identifiers and no patient information will be given to anyone outside of this research project.    Participating in a research study is not the same as getting regular medical care and will not improve the patient's health. The purpose of a research study is to gather information.  Being in this study does not interfere with your regular medical care.   The patient/LAR does not have to participate in this study. If they do not join, their care at [facility] will not be affected.  The person granting permission was provided adequate time to ask questions regarding the scope and purpose of the study.  Permission was obtained by telephone.   The above statements were read by the person obtaining permission to the person granting permission and witnessed by SANGITA Menjivar, who also confirmed the patient/LAR's consent to the study. The witness information was documented on the verbal consent form as well.  This Verbal Informed Consent process was conducted prior to initiation of any study procedures.

## 2022-01-28 ENCOUNTER — PATIENT MESSAGE (OUTPATIENT)
Dept: RHEUMATOLOGY | Facility: CLINIC | Age: 72
End: 2022-01-28
Payer: MEDICARE

## 2022-02-01 ENCOUNTER — PATIENT MESSAGE (OUTPATIENT)
Dept: RHEUMATOLOGY | Facility: CLINIC | Age: 72
End: 2022-02-01
Payer: MEDICARE

## 2022-02-07 ENCOUNTER — CLINICAL SUPPORT (OUTPATIENT)
Dept: URGENT CARE | Facility: CLINIC | Age: 72
End: 2022-02-07
Payer: MEDICARE

## 2022-02-07 DIAGNOSIS — Z20.822 ENCOUNTER FOR LABORATORY TESTING FOR COVID-19 VIRUS: Primary | ICD-10-CM

## 2022-02-07 LAB
CTP QC/QA: YES
SARS-COV-2 RDRP RESP QL NAA+PROBE: NEGATIVE

## 2022-02-07 PROCEDURE — U0002: ICD-10-PCS | Mod: QW,CR,S$GLB, | Performed by: PHYSICIAN ASSISTANT

## 2022-02-07 PROCEDURE — U0002 COVID-19 LAB TEST NON-CDC: HCPCS | Mod: QW,CR,S$GLB, | Performed by: PHYSICIAN ASSISTANT

## 2022-02-07 NOTE — PROGRESS NOTES
Requests COVID testing after exposure. VSS, no symptoms. Will call with results.     Patient Instructions   Instructions for Patients Awaiting COVID-19 Test Results    You will either be called with your test result or it will be released to the patient portal.  If you have any questions about your test, please visit [email or web address] or call our COVID-19 information line at [phone].    Prevention steps for patients with confirmed or suspected COVID-19     Stay home except to get medical care.   Separate yourself from other people and animals in your home   Call ahead before visiting your doctor.   Wear a facemask.   Cover your coughs and sneezes.   Clean your hands often.   Avoid sharing personal household items.   Clean all high-touch surfaces every day.   Monitor your symptoms. Seek prompt medical attention if your illness is worsening (e.g., difficulty breathing). Before seeking care, call your healthcare provider.   If you have a medical emergency and need to call 911, notify the dispatch personnel that you have, or are being evaluated for COVID-19. If possible, put on a facemask before emergency medical services arrive.   Discontinuing home isolation. Call your provider about guidance to discontinue home isolation.    Recommended precautions for household members, intimate partners, and caregivers in a nonhealthcare setting of a patient with symptomatic laboratory-confirmed COVID-19 or a patient under investigation.  Household members, intimate partners, and caregivers in a nonhealthcare setting may have close contact with a person with symptomatic, laboratory-confirmed COVID-19 or a person under investigation. Close contacts should monitor their health; they should call their healthcare provider right away if they develop symptoms suggestive of COVID-19 (e.g., fever, cough, shortness of breath).    Close contacts should also follow these recommendations:   Make sure that you  understand and can help the patient follow their healthcare provider's instructions for medication(s) and care. You should help the patient with basic needs in the home and provide support for getting groceries, prescriptions, and other personal needs.   Monitor the patient's symptoms. If the patient is getting sicker, call his or her healthcare provider and tell them that the patient has laboratory-confirmed COVID-19. This will help the healthcare provider's office take steps to keep other people in the office or waiting room from getting infected. Ask the healthcare provider to call the local or [facility] health department for additional guidance. If the patient has a medical emergency and you need to call 911, notify the dispatch personnel that the patient has, or is being evaluated for COVID-19.   Household members should stay in another room or be  from the patient as much as possible. Household members should use a separate bedroom and bathroom, if available.   Prohibit visitors who do not have an essential need to be in the home.   Household members should care for any pets in the home. Do not handle pets or other animals while sick.   Make sure that shared spaces in the home have good air flow, such as by an air conditioner or an opened window, weather permitting.   Perform hand hygiene frequently. Wash your hands often with soap and water for at least 20 seconds or use an alcohol-based hand  that contains 60 to 95% alcohol, covering all surfaces of your hands and rubbing them together until they feel dry. Soap and water should be used preferentially if hands are visibly dirty.   Avoid touching your eyes, nose, and mouth with unwashed hands.   The patient should wear a facemask when you are around other people. If the patient is not able to wear a facemask (for example, because it causes trouble breathing), you, as the caregiver should wear a mask when you are in the same room as the  patient.   Wear a disposable facemask and gloves when you touch or have contact with the patient's blood, stool, or body fluids, such as saliva, sputum, nasal mucus, vomit, urine.  o Throw out disposable facemasks and gloves after using them. Do not reuse.  o When removing personal protective equipment, first remove and dispose of gloves. Then, immediately clean your hands with soap and water or alcohol-based hand . Next, remove and dispose of facemask, and immediately clean your hands again with soap and water or alcohol-based hand .   Avoid sharing household items with the patient. You should not share dishes, drinking glasses, cups, eating utensils, towels, bedding, or other items. After the patient uses these items, you should wash them thoroughly (see below Wash laundry thoroughly).   Clean all high-touch surfaces, such as counters, tabletops, doorknobs, bathroom fixtures, toilets, phones, keyboards, tablets, and bedside tables, every day. Also, clean any surfaces that may have blood, stool, or body fluids on them.   Use a household cleaning spray or wipe, according to the label instructions. Labels contain instructions for safe and effective use of the cleaning product including precautions you should take when applying the product, such as wearing gloves and making sure you have good ventilation during use of the product.   Wash laundry thoroughly.  o Immediately remove and wash clothes or bedding that have blood, stool, or body fluids on them.  o Wear disposable gloves while handling soiled items and keep soiled items away from your body. Clean your hands (with soap and water or an alcohol-based hand ) immediately after removing your gloves.  o Read and follow directions on labels of laundry or clothing items and detergent. In general, using a normal laundry detergent according to washing machine instructions and dry thoroughly using the warmest temperatures recommended on  the clothing label.   Place all used disposable gloves, facemasks, and other contaminated items in a lined container before disposing of them with other household waste. Clean your hands (with soap and water or an alcohol-based hand ) immediately after handling these items. Soap and water should be used preferentially if hands are visibly dirty.   Discuss any additional questions with your [facility] or local health department or healthcare provider. Check available hours when contacting your local health department.    For more information see CDC link below.      https://www.cdc.gov/coronavirus/2019-ncov/hcp/guidance-prevent-spread.html#precautions        Sources:  [facility], [place] [facility] and [facility]        If not allergic,take tylenol (acetominophen) for fever control, chills, or body aches every 4 hours. Do not exceed 4000 mg/ day.If not allergic, take Motrin (Ibuprofen) every 4 hours for fever, chills, pain or inflammation. Do not exceed 2400 mg/day. You can alternate taking tylenol and motrin.    If you were prescribed a narcotic medication, do not drive or operate heavy equipment or machinery while taking these medications.  You must understand that you've received an Urgent Care treatment only and that you may be released before all your medical problems are known or treated. You, the patient, will arrange for follow up care as instructed.  Follow up with your PCP or specialty clinic as directed in the next 1-2 weeks if not improved or as needed.  You can call [phone] to schedule an appointment with the appropriate provider.  If your condition worsens we recommend that you receive another evaluation at the emergency room immediately or contact your primary medical clinics after hours call service to discuss your concerns.    Please return here or go to the Emergency Department for any concerns or worsening of condition.    If you have been referred to another provider and wish to  call to check on the status of your referral, please call [facility] Scheduling at [phone]

## 2022-02-07 NOTE — PATIENT INSTRUCTIONS
CDC Testing and Quarantine Guidelines for patients with exposure to a known-positive COVID-19 person:   A 'close exposure' is defined as anyone who has had an exposure (masked or unmasked) to a known COVID -19 positive person   o within 6 feet of someone   o for a cumulative total of 15 minutes or more over a 24-hour period.    vaccinated Have been boosted or completed the primary series of Pfizer or Moderna vaccine within the last 6 months or completed the primary series of J&J vaccine within the last 2 months and/or had a positive test within 90 days   o do NOT need to quarantine after contact with someone who had COVID-19 unless they have symptoms.   o fully vaccinated people who have not had a positive test within 90 days, should get tested 3-5 days after their exposure, even if they don't have symptoms and wear a mask indoors in public for 10 days following exposure or until their test result is negative on day 5.  o If you develop symptoms test and quarantine.     Unvaccinated, or are more than six months out from their second mRNA dose (or more than 2 months after the J&J vaccine) and not yet boosted,  and/or NOT had a positive test within 90 days and meet 'close exposure'  o you are required by CDC guidelines to quarantine for at least 5 days from time of exposure followed by 5 days of strict masking. It is recommended, but not required to test after 5 days, unless you develop symptoms, in which case you should test at that time.  o If you do decide to test at 5 days and are asymptomatic, the risk is that if you test without symptoms on Day 5 for example) and you are positive, your 5 day isolation begins on that day, and you turned your 5 day quarantine into 10 days.  o If your exposure does not meet the above definition, you can return to your normal daily activities to include social distancing, wearing a mask and frequent handwashing.  o Alternatively, if a 5-day quarantine is not feasible, it is  imperative that an exposed person wear a well-fitting mask at all times when around others for 10 days after exposure.

## 2022-02-14 ENCOUNTER — SPECIALTY PHARMACY (OUTPATIENT)
Dept: PHARMACY | Facility: CLINIC | Age: 72
End: 2022-02-14
Payer: MEDICARE

## 2022-02-14 ENCOUNTER — TELEPHONE (OUTPATIENT)
Dept: PAIN MEDICINE | Facility: CLINIC | Age: 72
End: 2022-02-14
Payer: MEDICARE

## 2022-02-14 NOTE — TELEPHONE ENCOUNTER
"This message is for patient in regards to his/her appointment 02/15/22 at 11:00a       [facility] Policy: For the safety of all patients and staff members.     Patient Visitor policy: Due to social distancing and limited seating staff are requesting patient to arrive to their schedule appointments on time.     Upon arriving to facility; patients are required to wear a face mask, if patient do not have a face mask one will be provided. Upon arriving patient we ask patients to contact clinic at this number [phone] to notify staff that they have arrived or they may do so by utilizing the [facility] in [place](if patient have patient portal; clinical staff will send a message through there letting them know it's okay to proceed to their visit). Staff will give the patient the "okay" to come up or wait inside their vehicle until clinic is ready for patient to be seen by Dr. Sree Hinson MD. If you have any questions or concerns please contact [phone]    [facility]  "

## 2022-02-14 NOTE — TELEPHONE ENCOUNTER
Specialty Pharmacy - Refill Coordination    Specialty Medication Orders Linked to Encounter    Flowsheet Row Most Recent Value   Medication #1 mycophenolate mofetil (CELLCEPT) 200 mg/mL SusR (Order#[id number], Rx#[id number])          Refill Questions - Documented Responses    Flowsheet Row Most Recent Value   Patient Availability and HIPAA Verification    Does patient want to proceed with activity? Yes   HIPAA/medical authority confirmed? Yes   Relationship to patient of person spoken to? Self   Refill Screening Questions    Changes to allergies? No   Changes to medications? No   New conditions since last clinic visit? No   Unplanned office visit, urgent care, ED, or hospital admission in the last 4 weeks? No   How does patient/caregiver feel medication is working? Excellent   How many doses of your specialty medications were missed in the last 4 weeks? > 5   Why were doses missed? Felt ill or sick   Would patient like to speak to a pharmacist? No   When does the patient need to receive the medication? 02/15/22   Refill Delivery Questions    How will the patient receive the medication? Delivery Subha   When does the patient need to receive the medication? 02/15/22   Shipping Address Home   Address in [facility] confirmed and updated if neccessary? Yes   Expected Copay ($) 15   Is the patient able to afford the medication copay? Yes   Payment Method CC on file   Days supply of Refill 32   Supplies needed? No supplies needed   Refill activity completed? Yes   Refill activity plan Refill scheduled   Shipment/Pickup Date: 02/15/22          Current Outpatient Medications   Medication Sig    albuterol (VENTOLIN HFA) 90 mcg/actuation inhaler Inhale 2 puffs into the lungs every 4 (four) hours as needed for Wheezing. Rescue (Patient not taking: No sig reported)    cadexomer iodine (IODOSORB) 0.9 % gel Apply topically daily as needed for Wound Care. (Patient not taking: No sig reported)    carboxymethylcellulose  sodium (REFRESH TEARS) 0.5 % Drop Apply 1-2 drops to every as needed    ergocalciferol (ERGOCALCIFEROL) 50,000 unit Cap TAKE 1 CAPSULE BY MOUTH EVERY 7 DAYS    ferrous sulfate 325 (65 FE) MG EC tablet Take 325 mg by mouth 2 (two) times daily.    loratadine (CLARITIN) 10 mg tablet Take 1 tablet (10 mg total) by mouth once daily.    multivitamin capsule Take 1 capsule by mouth once daily.    mycophenolate mofetil (CELLCEPT) 200 mg/mL SusR Take 5 mLs (1,000 mg total) by mouth 2 (two) times daily.    nabumetone (RELAFEN) 750 MG tablet Take 1 tablet (750 mg total) by mouth 2 (two) times daily as needed for Pain.    NIFEdipine (ADALAT CC) 90 MG TbSR TAKE 1 TABLET(90 MG) BY MOUTH EVERY DAY    NIFEdipine (PROCARDIA-XL) 30 MG (OSM) 24 hr tablet Take 1 tablet (30 mg total) by mouth once daily.    pravastatin (PRAVACHOL) 20 MG tablet TAKE 1 TABLET(20 MG) BY MOUTH EVERY EVENING    pregabalin (LYRICA) 300 MG Cap Take 1 capsule (300 mg total) by mouth 2 (two) times daily.    PREVIDENT 5000 SENSITIVE 1.1-5 % Pste BRUSH FOR 2 MINUTES TWICE PER DAY    RABEprazole (ACIPHEX) 20 mg tablet Take 1 tablet (20 mg total) by mouth 2 (two) times daily.    sodium chloride 0.9% 0.9 % irrigation Irrigate with 2 mLs as directed daily as needed (wound cleaning). (Patient not taking: No sig reported)    tadalafil (ADCIRCA) 20 mg Tab Take 2 tablets (40 mg total) by mouth once daily.    tiZANidine (ZANAFLEX) 4 MG tablet Take 1 tablet (4 mg total) by mouth nightly as needed (muscle pain).   Last reviewed on 1/24/2022 10:05 AM by Carla Orta PA-C    Review of patient's allergies indicates:   Allergen Reactions    Sulfa (sulfonamide antibiotics)      Other reaction(s): Rash    Tramadol Itching    Last reviewed on  1/25/2022 12:58 PM by Trey Gaytan      Tasks added this encounter   No tasks added.   Tasks due within next 3 months   2/21/2022 - Refill Call (Auto Added)     Ross, PharmD  [facility]  [id number]  [facility] [address]  Phone: [phone]  Fax: [phone]

## 2022-02-15 ENCOUNTER — OFFICE VISIT (OUTPATIENT)
Dept: PAIN MEDICINE | Facility: CLINIC | Age: 72
End: 2022-02-15
Payer: MEDICARE

## 2022-02-15 VITALS
DIASTOLIC BLOOD PRESSURE: 75 MMHG | HEART RATE: 77 BPM | RESPIRATION RATE: 18 BRPM | WEIGHT: 149.69 LBS | TEMPERATURE: 96 F | HEIGHT: 65 IN | SYSTOLIC BLOOD PRESSURE: 144 MMHG | BODY MASS INDEX: 24.94 KG/M2

## 2022-02-15 DIAGNOSIS — M79.18 MYOFASCIAL PAIN: ICD-10-CM

## 2022-02-15 DIAGNOSIS — M34.89 CUTANEOUS SCLERODERMA: ICD-10-CM

## 2022-02-15 DIAGNOSIS — G89.4 CHRONIC PAIN DISORDER: ICD-10-CM

## 2022-02-15 DIAGNOSIS — G60.9 IDIOPATHIC NEUROPATHY: ICD-10-CM

## 2022-02-15 PROCEDURE — 99215 OFFICE O/P EST HI 40 MIN: CPT | Mod: PBBFAC | Performed by: ANESTHESIOLOGY

## 2022-02-15 PROCEDURE — 99999 PR PBB SHADOW E&M-EST. PATIENT-LVL V: ICD-10-PCS | Mod: PBBFAC,,, | Performed by: ANESTHESIOLOGY

## 2022-02-15 PROCEDURE — 99999 PR PBB SHADOW E&M-EST. PATIENT-LVL V: CPT | Mod: PBBFAC,,, | Performed by: ANESTHESIOLOGY

## 2022-02-15 PROCEDURE — 99214 PR OFFICE/OUTPT VISIT, EST, LEVL IV, 30-39 MIN: ICD-10-PCS | Mod: S$PBB,,, | Performed by: ANESTHESIOLOGY

## 2022-02-15 PROCEDURE — 99214 OFFICE O/P EST MOD 30 MIN: CPT | Mod: S$PBB,,, | Performed by: ANESTHESIOLOGY

## 2022-02-15 RX ORDER — PREGABALIN 300 MG/1
300 CAPSULE ORAL 2 TIMES DAILY
Qty: 60 CAPSULE | Refills: 6 | Status: SHIPPED | OUTPATIENT
Start: 2022-02-15 | End: 2022-08-26 | Stop reason: SDUPTHER

## 2022-02-15 RX ORDER — TIZANIDINE 4 MG/1
4 TABLET ORAL NIGHTLY PRN
Qty: 30 TABLET | Refills: 3 | Status: SHIPPED | OUTPATIENT
Start: 2022-02-15 | End: 2022-08-26 | Stop reason: SDUPTHER

## 2022-02-15 RX ORDER — ACETAMINOPHEN AND CODEINE PHOSPHATE 300; 30 MG/1; MG/1
1 TABLET ORAL DAILY PRN
Qty: 30 TABLET | Refills: 0 | Status: SHIPPED | OUTPATIENT
Start: 2022-02-15 | End: 2022-03-22 | Stop reason: SDUPTHER

## 2022-02-15 NOTE — PROGRESS NOTES
Subjective:       Patient ID: Yamel Shah is a 71 y.o. female.    Chief Complaint: Neck Pain (Pain on her neck, feet, and right index finger-Evaluation )    Interval History 2/15/22  Patient presents in follow up. Was previously Dr. King patient with primary complaints of neck and foot pain. She reports her pain has been stable since her last visit. She did have covid in January and stopped all of ehr pain medications. She got an antibody infusion. She has fully recovered. She restarted her pain medications and reports that they are effective. She is taking Tylenol 3 daily, nambumetome 750 mg BID, lyrica 150 mg BID and Zanaflex 4 mg TID. She did do PT for her neck November to January per her report. She continues with mild neck pain without radiation into the arms or hands. Pain is worse with sidebending and rotation to the right and better with rest and medications. She denies any numbness tingling weakness or b/b incontinence.      Interval History 1/4/2022:  The patient returns to clinic today for follow up of foot pain via virtual visit. She continues to report foot pain and wounds. She continues to follow up with podiatry and wound care. She reports increased neck pain. This pain is tight and aching in nature. She denies any radicular arm pain. She is currently taking Relafen, Lyrica, and Tylenol #3 with benefit and without adverse effects. She reports limited benefit with Zanaflex. She denies any other health changes.      Interval History 12/8/2021:  The patient returns to clinic today for foot pain via virtual visit. She continues to report foot pain. She does have foot wounds. She recently saw Dr. Claudio in Vascular. He does not recommend any vascular interventions at this time. She continues to follow up with Podiatry and wound care. She continues to take Zanaflex, Relafen, Lyrica and Tylenol #3 with benefit. She denies any adverse effects. She denies any other health changes. Her pain  today is 7/10.     Interval History 11/8/2021:  The patient returns to clinic today for follow up of foot pain via virtual visit. At last visit, she was starting on Tylenol #3. She does find benefit with this. She sometimes breaks this in half. She continues to take Zanaflex, Relafen, and Lyrica. She continues to report bilateral foot pain and ulcers. She continues to follow up with podiatry. Her pain is worse at night. She denies any other health changes. Her pain today is 7/10.     Interval History 10/20/2021:  The patient returns to clinic today for follow up of foot pain. At last visit, she was started on Tramadol. She did have relief but had significant side effects. She experienced sedation, itching, headaches, and decreased appetite. This has resolved after stopping the medication. She continues to report foot pain. This pain is worse at night. She continues to follow up with podiatry. She continues to take Tizanidine, Relafen, and Lyrica with some benefit. She denies any adverse effects. She denies any other health changes. Her pain today is 8/10.     Interval history 10/06/2021:  Since previous encounter the patient continues to have nonhealing wound has been followed up with wound care and is scheduled to follow with Rheumatology for the wounds in her legs she was referred to podiatry with stated that they have done nothing different me that she with doing on her own.  She continues to have neck pain and bilateral foot pain.  She has been taking tizanidine Relafen Tylenol p.m. and Lyrica 600 mg per day.  She states that all these medications are helpful.     Interval History 6/3/2021:  The patient returns to clinic today for follow up of foot pain. She continues to report left foot pain secondary to ulcer. She is seeing Wound Care. She describes this pain as burning in nature. Her pain is worse with prolonged activity. She reports intermittent neck pain. She continues to take Zanaflex. She reports limited  benefit with increased Lyrica dose. She denies any other health changes. Her pain today is 5/10.     Interval History 5/5/2021:  The patient returns to clinic today for follow up of foot pain. She reports a new ulcer on her left foot. She is seeing Wound Care. She reports increased pain with this new ulcer. She describes this pain as burning. She continues to report neck pain that is tight and aching. She denies any radicular arm pain. Her pain is worse with prolonged activity, especially turning her head to the side. She continues to perform a home exercise routine. She continues to take Lyrica and Zanaflex with benefit. She denies any other health changes. Her pain today is 5/10.     Interval History 2/8/2021:  The patient returns to clinic today for follow up of neck and foot pain. She reports that her neck pain has significantly improved since last visit. She has recently completed physical therapy for this. She is performing a home stretching routine. She reports intermittent neck pain that is tight and aching in nature. She denies any radicular arm pain. She continues to report bilateral foot pain. This is worse at night. She continues to take Lyrica and Zanaflex with benefit. She denies any other health changes. Her pain today is 4/10.     Interval History 1/8/2021:  The patient returns to clinic today for follow up of neck and foot pain. She continues to report neck pain that is tight and aching in nature. She denies any radicular pain. She continues to participate in physical therapy and a home exercise routine. She continues to report bilateral foot pain, worse at night. She reports that increased Lyrica dose has provided improved benefit. She also takes Zanaflex with benefit. She denies any other health changes. Her pain today is 3/10.     Interval History 11/13/2020:  The patient returns to clinic today for follow up of neck and foot pain. She continues to report bilateral foot pain. Since last visit,  she had a venous ablation procedure on her right leg through Vascular. She is scheduled for the left side in the next month. She continues to report bilateral foot pain, worse at night. She continues to report neck pain that is tight and aching in nature. She denies any radicular pain. Her pain is worse flexion and turning her head to the side. She is currently participating in physical therapy with some benefit. She continues to take Lyrica but does ask if this could be increased. She continues to take Zanaflex with benefit. She denies any other health changes. Her pain today is 5/10     Interval History 10/2/2020:  The patient returns to clinic today for follow up of foot pain. She reports increased bilateral foot pain over the last few months. This pain is burning in nature. This pain is worse at night. She continues to have ulcers to her feet related to her scleroderma. She would like to restart the Lyrica. She also reports increased neck pain that is sore and aching in nature. She denies any radiating arm pain. This is worse with turning her head and at night. She denies any other health changes. Her pain today is 7/10.     Interval History 6/5/2020:  The patient requests audio visit today for follow up of bilateral foot pain. She reports intermittent foot pain that is tolerable at this time. She has discontinued Lyrica as of April. She continues to have ulcers to her feet. She does have wound care. She denies any other health changes.      Interval History 1/17/2020:  The patient returns to clinic today for follow up. She continues to report bilateral foot pain. She describes this pain as burning and tingling in nature. At last visit, we decreased her Lyrica dose to 100 mg at night. She reports increased pain since then. She would like to go to back to the 150 mg dose. She continues to have ulcers to her feet, left worse than right. She continues to participate in a home wound care program. She denies any  other health changes. Her pain today is 6/10.     Interval History 12/17/2019:  The patient returns to clinic today for follow up. She reports improving foot pain. She reports improved healing ulcers to her left foot. She continues to report right foot pain that is burning and tingling in nature. She continues to participate in a home wound care routine. She continues to take Lyrica. She asks about decreasing this. She denies any other health changes. Her pain today is 5/10.     Interval History 9/17/2019:  The patient returns to clinic today for follow up. She continues to report bilateral foot pain that is burning and tingling in nature. Her pain is worse with sitting and at night. She continues to have slow healing ulcers to both feet. She continues to perform a home wound care program. She is no longer taking Gabapentin which was previously called in. She is now taking Pregabalin generic with benefit. She denies any other health changes. Her pain today is 4/10.     Interval History 6/13/2019:  The patient returns to clinic for follow up for bilateral foot pain. She continues to report bilateral foot pain that is burning in nature. She continues to have slow healing wounds to both feet from ulcers. She continues to take Lyrica once daily but reports increased pain. She continues to take Vitamin B12. She denies any other health changes. Her pain today is 8/10.     Interval History 3/13/2019:  The patient returns to clinic today for follow up. She continues to report bilateral foot pain that is burning and tingling in nature. Her pain is worse with prolonged walking and standing. She continues to have bilateral foot wounds. She reports that these wounds have significantly improved since last visit. She is currently taking Vitamin B12 with benefit. She continues to report benefit with Lyrica. She is currently taking this once daily. She denies any other health changes. Her pain today is 5/10.     Interval History  2/6/2019:  The patient returns to clinic today for follow up. She reports that her bilateral foot wounds have significantly improved since last visit. She does report some significant improvement in her foot pain. She reports intermittent bilateral foot pain especially with prolonged walking. She describes this pain as heavy and tingling in nature. She is no longer taking Celebrex. She is currently taking Lyrica 150 mg BID with benefit. She does report that the Lyrica is expensive for her. She denies any other health changes. Her pain today is 5/10.     Interval History 12/5/18:  Patient returns to clinic today for follow up. She reports that since increasing her medications, she is having significant improvement in pain during the day time. She is currently taking Lyrica 75mg TID and Celebrex 200mg TID. However, she states that the burning  And tingling pain in both her feet increase in the evening around 6 or 7pm. She is unable to sleep during the nights due to the pain. She is still wearing her bilateral boots due to non healing ulcers from her scleroderma.      Interval History 8/30/2018:  The patient returns to clinic today for follow up. She continues to report bilateral foot pain that is burning and tingling in nature. She reports that this pain is worse at night. She is currently taking Lyrica 75 mg BID with limited relief. She did not have any benefit with Mobic. She continues to take Aleve with some benefit. She continues to have nonhealing ulcers. She wears an ACE bandage to her right calf, as well as boots to her bilateral feet. She denies any other health changes. Her pain today is 7/10.      Interval History 7/11/2018:  Since previous encounter she has weaned from gabapentin to 600mg / day and did not notice significant worsening of pain, but was having somnelence from higher doses of the medication in the past.  We are weaning in an attempt to trial Lyrica as an alternative to gabapentin.  Tramadol  causes significant sedation, limiting its use.  She has discontinued the use of the tramadol.  Additionally she does have benefit from anti-inflammatory medication, has been using aleve, has not trialed CANTRELL-2 inhibitors in the past.     Interval history 05/16/2018:       The patient has had x-rays of the lumbar spine which did not reveal any evidence of significant neuroforaminal stenosis with degenerative disc disease.  There is mild facet arthropathy.  She has escalated her gabapentin and is currently taking 2100 mg of gabapentin per day without any noticeable improvement but daytime somnolence.  She continues to have nonhealing ulcers and topical pain cream is helping to a limited degree over her leg in areas where there is no skin disruption.     Initial encounter:     Yamel Shah presents to the clinic for the evaluation of foot pain. The pain started 2 years ago following ulcers and symptoms have been worsening.     Brief history: History of scleroderma     Pain Description:     The pain is located in the both feet in the area of slowly healing chronic foot ulcers which were partly from venous insufficiency and stasis associated with her scleroderma.  In her right lower extremity she describes radicular symptoms in the L4/5 distribution.     At BEST  5/10      At WORST  10/10 on the WORST day.       On average pain is rated as 8/10.      Today the pain is rated as 8/10     The pain is described as burning, sharp, shooting and constant       Symptoms interfere with daily activity, sleeping and work.      Exacerbating factors: Laying, Walking, Night Time, Morning and Getting out of bed/chair.       Mitigating factors medications.      Patient denies night fever/night sweats, urinary incontinence, bowel incontinence, significant weight loss, significant motor weakness and loss of sensations.  Patient denies any suicidal or homicidal ideations     Pain Medications:  Current:  Lyrica 300 mg  daily  Zanaflex 4 mg  Tylenol 3 prn  Nambumetome 750 mg BID     Tried in Past:  NSAIDs -Aleve, Mobic, Celebrex  TCA -Never  SNRI -Never  Anti-convulsants - Gabapentin, Lyrica  Muscle Relaxants -Never  Opioids-Tramadol     Physical Therapy/Home Exercise: no        report:  Reviewed and consistent with medication use as prescribed.     Pain Procedures: none     Chiropractor -never  Acupuncture - never  TENS unit -never  Spinal decompression -never  Joint replacement -never     Imaging:   Xray Cervical Spine 12/6/2019:  FINDINGS:  Odontoid prevertebral soft tissues and posterior elements are intact.  Neural foramina are patent.  No fracture dislocation bone destruction seen.  There is mild DJD.     Impression:     Mild DJD.     X-ray lumbar spine 6/1/2016:  2 views: Alignment is normal. There is mild DJD. No fracture dislocation bone destruction seen.   Impression       Mild DJD.      Xray lumbar spine 4/2018:  COMPARISON:  June 2016    FINDINGS:  There is slight curvature of the lumbar spine.  The vertebral bodies are normally aligned and normal in height.  Mild disc space narrowing present at L5-S1.  There is mild facet degenerative change in the lower spine.  No significant osteophytic spurring present.  There is no change in alignment with flexion or extension.           Review of Systems   Constitutional: Negative for activity change.   HENT: Positive for mouth dryness.          Objective:      Physical Exam    Assessment:     71 year old female with chronic pain and PMH of scleroderma:    Problem List Items Addressed This Visit        Neuro    Idiopathic neuropathy    Relevant Medications    pregabalin (LYRICA) 300 MG Cap    Chronic pain disorder    Relevant Medications    pregabalin (LYRICA) 300 MG Cap    acetaminophen-codeine 300-30mg (TYLENOL #3) 300-30 mg Tab    tiZANidine (ZANAFLEX) 4 MG tablet       Immunology/Multi System    Cutaneous scleroderma    Relevant Medications    pregabalin (LYRICA) 300 MG Cap       Other Visit Diagnoses     Myofascial pain        Relevant Medications    tiZANidine (ZANAFLEX) 4 MG tablet          Plan:       PLAN:        - Previous imaging was reviewed and discussed with the patient today.     - Consider cervical facet joint injections in the future.      - Continue home exercise routine.      - Cotinue Tylenol #3 once daily PRN. Refilled today.     - Continue Lyrica 300 mg BID, refill provided.      - Continue Relafen.      - Refilled Tizanidine      - Continue to follow up with wound care. Continue follow up with Vascular.      - RTC in 2 months with G[facility].      The above plan and management options were discussed at length with patient. Patient is in agreement with the above and verbalized understanding.     Bebeto Rucker DO  [facility]       I have reviewed and concur with the resident's history, physical, assessment, and plan.  I have personally interviewed and examined the patient at bedside.  See below addendum for my evaluation and additional findings.    Sree Hinson MD

## 2022-02-24 ENCOUNTER — PATIENT MESSAGE (OUTPATIENT)
Dept: PAIN MEDICINE | Facility: CLINIC | Age: 72
End: 2022-02-24
Payer: MEDICARE

## 2022-02-24 RX ORDER — NABUMETONE 750 MG/1
750 TABLET, FILM COATED ORAL 2 TIMES DAILY
Qty: 60 TABLET | Refills: 3 | Status: SHIPPED | OUTPATIENT
Start: 2022-02-24 | End: 2022-03-26

## 2022-03-04 ENCOUNTER — TELEPHONE (OUTPATIENT)
Dept: TRANSPLANT | Facility: CLINIC | Age: 72
End: 2022-03-04
Payer: MEDICARE

## 2022-03-04 NOTE — TELEPHONE ENCOUNTER
----- Message from Sergio Wilks sent at 3/4/2022  3:14 PM CST -----  Regarding: cancel appt  Pt calling to reschedule visit 3/15    Call # [phone]

## 2022-03-07 ENCOUNTER — TELEPHONE (OUTPATIENT)
Dept: GASTROENTEROLOGY | Facility: CLINIC | Age: 72
End: 2022-03-07
Payer: MEDICARE

## 2022-03-09 ENCOUNTER — PATIENT MESSAGE (OUTPATIENT)
Dept: GASTROENTEROLOGY | Facility: CLINIC | Age: 72
End: 2022-03-09
Payer: MEDICARE

## 2022-03-09 RX ORDER — TADALAFIL 20 MG/1
40 TABLET ORAL DAILY
Qty: 28 TABLET | Refills: 11 | Status: SHIPPED | OUTPATIENT
Start: 2022-03-09 | End: 2023-05-01 | Stop reason: SDUPTHER

## 2022-03-10 RX ORDER — RABEPRAZOLE SODIUM 20 MG/1
20 TABLET, DELAYED RELEASE ORAL 2 TIMES DAILY
Qty: 60 TABLET | Refills: 11 | Status: SHIPPED | OUTPATIENT
Start: 2022-03-10 | End: 2022-06-27 | Stop reason: SDUPTHER

## 2022-03-12 ENCOUNTER — PATIENT MESSAGE (OUTPATIENT)
Dept: PHARMACY | Facility: CLINIC | Age: 72
End: 2022-03-12
Payer: MEDICARE

## 2022-03-12 DIAGNOSIS — M34.9 SCLERODERMA: ICD-10-CM

## 2022-03-13 RX ORDER — MYCOPHENOLATE MOFETIL 200 MG/ML
1000 POWDER, FOR SUSPENSION ORAL 2 TIMES DAILY
Qty: 480 ML | Refills: 1 | Status: SHIPPED | OUTPATIENT
Start: 2022-03-13 | End: 2022-08-26 | Stop reason: SDUPTHER

## 2022-03-15 ENCOUNTER — SPECIALTY PHARMACY (OUTPATIENT)
Dept: PHARMACY | Facility: CLINIC | Age: 72
End: 2022-03-15
Payer: MEDICARE

## 2022-03-19 ENCOUNTER — PATIENT MESSAGE (OUTPATIENT)
Dept: ADMINISTRATIVE | Facility: OTHER | Age: 72
End: 2022-03-19
Payer: MEDICARE

## 2022-03-24 DIAGNOSIS — J84.9 ILD (INTERSTITIAL LUNG DISEASE): Primary | ICD-10-CM

## 2022-04-07 ENCOUNTER — LAB VISIT (OUTPATIENT)
Dept: LAB | Facility: HOSPITAL | Age: 72
End: 2022-04-07
Payer: MEDICARE

## 2022-04-07 ENCOUNTER — PATIENT OUTREACH (OUTPATIENT)
Dept: ADMINISTRATIVE | Facility: OTHER | Age: 72
End: 2022-04-07
Payer: MEDICARE

## 2022-04-07 ENCOUNTER — TELEPHONE (OUTPATIENT)
Dept: RHEUMATOLOGY | Facility: CLINIC | Age: 72
End: 2022-04-07
Payer: MEDICARE

## 2022-04-07 DIAGNOSIS — Z79.899 OTHER LONG TERM (CURRENT) DRUG THERAPY: ICD-10-CM

## 2022-04-07 DIAGNOSIS — M34.9 SCLERODERMA: ICD-10-CM

## 2022-04-07 DIAGNOSIS — D64.9 ANEMIA, UNSPECIFIED TYPE: Primary | ICD-10-CM

## 2022-04-07 LAB
ALBUMIN SERPL BCP-MCNC: 3.4 G/DL (ref 3.5–5.2)
ALP SERPL-CCNC: 104 U/L (ref 55–135)
ALT SERPL W/O P-5'-P-CCNC: 7 U/L (ref 10–44)
ANION GAP SERPL CALC-SCNC: 7 MMOL/L (ref 8–16)
AST SERPL-CCNC: 14 U/L (ref 10–40)
BASOPHILS # BLD AUTO: 0.03 K/UL (ref 0–0.2)
BASOPHILS NFR BLD: 0.6 % (ref 0–1.9)
BILIRUB SERPL-MCNC: 0.3 MG/DL (ref 0.1–1)
BUN SERPL-MCNC: 19 MG/DL (ref 8–23)
CALCIUM SERPL-MCNC: 8.9 MG/DL (ref 8.7–10.5)
CHLORIDE SERPL-SCNC: 109 MMOL/L (ref 95–110)
CO2 SERPL-SCNC: 25 MMOL/L (ref 23–29)
CREAT SERPL-MCNC: 0.7 MG/DL (ref 0.5–1.4)
CRP SERPL-MCNC: 10.4 MG/L (ref 0–8.2)
DIFFERENTIAL METHOD: ABNORMAL
EOSINOPHIL # BLD AUTO: 0.1 K/UL (ref 0–0.5)
EOSINOPHIL NFR BLD: 1.5 % (ref 0–8)
ERYTHROCYTE [DISTWIDTH] IN BLOOD BY AUTOMATED COUNT: 15 % (ref 11.5–14.5)
ERYTHROCYTE [SEDIMENTATION RATE] IN BLOOD BY WESTERGREN METHOD: 46 MM/HR (ref 0–36)
EST. GFR  (AFRICAN AMERICAN): >60 ML/MIN/1.73 M^2
EST. GFR  (NON AFRICAN AMERICAN): >60 ML/MIN/1.73 M^2
GLUCOSE SERPL-MCNC: 93 MG/DL (ref 70–110)
HCT VFR BLD AUTO: 33.3 % (ref 37–48.5)
HGB BLD-MCNC: 10.6 G/DL (ref 12–16)
IMM GRANULOCYTES # BLD AUTO: 0.02 K/UL (ref 0–0.04)
IMM GRANULOCYTES NFR BLD AUTO: 0.4 % (ref 0–0.5)
LYMPHOCYTES # BLD AUTO: 1 K/UL (ref 1–4.8)
LYMPHOCYTES NFR BLD: 18.9 % (ref 18–48)
MCH RBC QN AUTO: 29.5 PG (ref 27–31)
MCHC RBC AUTO-ENTMCNC: 31.8 G/DL (ref 32–36)
MCV RBC AUTO: 93 FL (ref 82–98)
MONOCYTES # BLD AUTO: 0.5 K/UL (ref 0.3–1)
MONOCYTES NFR BLD: 9.6 % (ref 4–15)
NEUTROPHILS # BLD AUTO: 3.7 K/UL (ref 1.8–7.7)
NEUTROPHILS NFR BLD: 69 % (ref 38–73)
NRBC BLD-RTO: 0 /100 WBC
PLATELET # BLD AUTO: 206 K/UL (ref 150–450)
PMV BLD AUTO: 11.7 FL (ref 9.2–12.9)
POTASSIUM SERPL-SCNC: 4.2 MMOL/L (ref 3.5–5.1)
PROT SERPL-MCNC: 7.7 G/DL (ref 6–8.4)
RBC # BLD AUTO: 3.59 M/UL (ref 4–5.4)
SODIUM SERPL-SCNC: 141 MMOL/L (ref 136–145)
WBC # BLD AUTO: 5.4 K/UL (ref 3.9–12.7)

## 2022-04-07 PROCEDURE — 85652 RBC SED RATE AUTOMATED: CPT | Performed by: STUDENT IN AN ORGANIZED HEALTH CARE EDUCATION/TRAINING PROGRAM

## 2022-04-07 PROCEDURE — 86140 C-REACTIVE PROTEIN: CPT | Performed by: STUDENT IN AN ORGANIZED HEALTH CARE EDUCATION/TRAINING PROGRAM

## 2022-04-07 PROCEDURE — 36415 COLL VENOUS BLD VENIPUNCTURE: CPT | Performed by: STUDENT IN AN ORGANIZED HEALTH CARE EDUCATION/TRAINING PROGRAM

## 2022-04-07 PROCEDURE — 85025 COMPLETE CBC W/AUTO DIFF WBC: CPT | Performed by: STUDENT IN AN ORGANIZED HEALTH CARE EDUCATION/TRAINING PROGRAM

## 2022-04-07 PROCEDURE — 80053 COMPREHEN METABOLIC PANEL: CPT | Performed by: STUDENT IN AN ORGANIZED HEALTH CARE EDUCATION/TRAINING PROGRAM

## 2022-04-07 NOTE — PROGRESS NOTES
Health Maintenance Due   Topic Date Due    Sign Pain Contract  Never done    Complete Opioid Risk Tool  Never done    COVID-19 Vaccine (2 - Moderna risk 4-dose series) 10/19/2021    Mammogram  01/27/2022     Updates were requested from care everywhere.  Chart was reviewed for overdue Proactive [facility] Encounters (OPAL) topics (CRS, Breast Cancer Screening, Eye exam)  Health Maintenance has been updated.  LINKS immunization registry triggered.  Immunizations were reconciled.

## 2022-04-09 NOTE — PROGRESS NOTES
Subjective:       Patient ID: Yamel Shah is a 71 y.o. female.    Chief Complaint: Systemic scleroderma disease    HPI     Yamel Shah is a 71 y.o. 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 She was originally diagnosed in 1983 and has been seeing Dr. Ahuja for over 10 years.( +SSA, +SCL-70, -RNAP3, ILD). She has been on MMF since 2019. Used to follow with Dr. Leigh in pulmonology but now follows with Dr. Merlos. Additionally, she has chronic ulcers in her feet and sees podiatry and vascular. She is s/p laser venous ablation on right foot in 12/2020 by Dr. Claudio. She follows with Dr. Martins in Cardiology and had a right heart cath 1/2021 which showed normal PA pressures. She takes tadalafil. Her last echo was in 9/2020 with indeteriminate diasystolic dysfunction and PA 40 mmhg.     Interval History:  4/11/22: She comes today for follow-up.  She was previously taking CellCept but discontinued this 3 weeks ago due to concern that it was causing her to have frequent chills.  Of note, she denies fever, chest pain, shortness of breath, joint pains, or joint swelling. CBC from 4/7 showed moderate anemia (hgb 12.3-> 10.6), otherwise within normal limits.  She is scheduled to see GI after this appointment today.  She denies any bloody bowel movements or any dark tarry stools.  She also denies abdominal pain.  She is concerned about her nonhealing ulcers but states that these do appear improved from last appointment.  She was seen by Wound care in [place] last year who recommended possible skin grafts but she declines this at this time.  She is also scheduled to see pulmonology on 04/19/2022.      10/27/21: Doing well today, does report persistent pain in her feet due to her chronic ulcers. She will see podiatry later today and states that  she thinks that they are healing slowly. She denies joint pain, joint swelling, oral/nasal ulcers, fevers, or chest pain. She has occasional shortness of breath when she goes up stairs but states that this is stable. She was last seen by Dr. Merlos in pulmonology on 7/2021 where her PFTs were noted to be stable. Prior to this, seen by Dr. Leigh in 5/2020, stated that not a candidate for antifibrotic at that time. She will see podiatry later today for the chronic ulcers on her feet and states that these appear to be healing slowly. She last saw vascular surgery in 12/2020. Her last echo was in 9/2020 with indeteriminate diasystolic dysfunction and PA 40 mmhg. She had a right heart cath 1/2021 which showed normal PA pressures. She denies joint pain, joint swelling, oral/nasal ulcers, fevers, or chest pain.    1/3/21: she reports that she is doing well and that her ulcers are unchanged from prior visit. She was seen by Dr. Claudio in vascular surgery who stated that the ulcers due to venous insufficiency and not arterial, requiring podiatry and wound care. ABIs were normal from 12/2021. Last echo in 12/29/21 showed normal EF with PA pressure 32 hgmm (prior in 2020 was 40 hgmm). She denies increased SOB or chest pain. Got PneumoVax 23 in 11/2021. She has not had an issue with Raynaud's this season. She continues on nifedipine and tadalafil. Reports receiving COVID booster since last appointment but did not hold MMF for a week after this.    Treatment Regimen:   She is currently on MMF 1000mg BID (liquid suspension) (she reports that she has held this for the past 3 weeks as of 4/11/22)    Review of Systems   Constitutional: Negative for fever and unexpected weight change.   HENT: Positive for trouble swallowing. Negative for mouth sores.    Eyes: Negative for redness.   Respiratory: Negative for cough and shortness of breath.    Cardiovascular: Negative for chest pain.   Gastrointestinal: Negative for constipation and  "diarrhea.   Genitourinary: Negative for dysuria and genital sores.   Musculoskeletal: Negative for joint swelling.   Skin: Positive for rash.   Neurological: Negative for headaches.   Hematological: Bruises/bleeds easily.         Objective:   /65   Pulse 80   Temp 97 °F (36.1 °C)   Ht 5' 5" (1.651 m)   Wt 69.9 kg (154 lb)   BMI 25.63 kg/m²      Physical Exam   Constitutional: She is oriented to person, place, and time. No distress.   HENT:   Head: Normocephalic and atraumatic.   Right Ear: External ear normal.   Left Ear: External ear normal.   Nose: Nose normal.   Mouth/Throat: Oropharynx is clear and moist.   Eyes: Conjunctivae are normal. Right eye exhibits no discharge. Left eye exhibits no discharge. No scleral icterus.   Neck: No thyromegaly present.   Cardiovascular: Normal rate, regular rhythm and normal heart sounds.   No murmur heard.  Pulmonary/Chest: Effort normal and breath sounds normal. No respiratory distress. She has no wheezes. She exhibits no tenderness.   Crackles b/l stable from prior exam   Abdominal: Soft. Bowel sounds are normal. She exhibits no distension. There is no abdominal tenderness.   Musculoskeletal:         General: Deformity present. No tenderness. Normal range of motion.      Comments: Skin tightness  Claw hand deformity, flexion contractures b/l  Skin score: 33  Chronic LE ulcers.   Neurological: She is alert and oriented to person, place, and time.   Skin: Skin is warm and dry. No rash noted. She is not diaphoretic. No erythema.   Psychiatric: Mood and affect normal.   Vitals reviewed.                Modified Skin Score:  Skin score head= 2  Chest= 1  Stomach=2  Right upper arm=1  Left upper arm=1    Right lower arm=2  Left lower arm: 1  Right hand=3 Left hand=2  Fingers= 3        Fingers=3  Right upper leg= 2  Left upper leg=2  Right lower leg= 1  Left lower leg= 1  Right foot= 3            Left foot= 3    Modified Skin Score: 33      Assessment:       1. Osteopenia, " unspecified location    2. Scleroderma    3. ILD (interstitial lung disease)    4. Pulmonary hypertension    5. Vitamin D deficiency    6. Other long term (current) drug therapy     7. Anemia, unspecified type    8. Asymptomatic menopausal state         Yamel Shah is a 70 y.o. female with SSc (+SSA, +SCL-70, -RNAP3) with ILD, diastolic dysfunction, and GERD with Pagan's esophagus who presents for follow up for disease management. She was originally diagnosed in 1983 and has been seeing Dr. Ahuja for over 10 years.( +SSA, +SCL-70, -RNAP3, ILD). She has been on MMF since 2019. Used to follow with Dr. Leigh in pulmonology. She follows with Dr. Martins in Cardiology and had a right heart cath 1/2021 which showed normal PA pressures. She takes tadalafil. Her last echo was in 9/2020 with indeteriminate diasystolic dysfunction and PA 40 mmhg. Additionally, she has chronic ulcers in her feet. She is s/p laser venous ablation on right foot in 12/2020 by Dr. Claudio, recently seen in 12/2021 where ABIs were normal without signs of PVD. She comes for follow up today. We discussed that CellCept also for her ILD, she is amenable to restarting CellCept and monitoring.  Otherwise she has tolerated this medication well in the past.  Labs showed concern for decreased and hemoglobin, she denies melena or hematochezia.  She is scheduled to see GI after this appointment today. She will also see pulmonology later this month.  Will schedule DEXA today.    Plan:       Problem List Items Addressed This Visit        Pulmonary    Pulmonary hypertension    ILD (interstitial lung disease)       Endocrine    Vitamin D deficiency       Orthopedic    Osteopenia - Primary    Relevant Orders    DXA Bone Density Spine And Hip      Other Visit Diagnoses     Scleroderma        Other long term (current) drug therapy         Anemia, unspecified type        Relevant Orders    CBC Auto Differential    Asymptomatic menopausal state          Relevant Orders    DXA Bone Density Spine And Hip        - encouraged to resume continue MMF 1000mg BID (liquid suspension) and monitor for side-effects  - Cardiac: last echo 12/2021 with hgmm 32, EF 63%, and trivial posterior cardiac effusion   - Pulm: PFT from 1/202022 without DLCO, to see pulmonology Dr. Leigh in 4/19/2022, to evaluate if she would benefit from anti-fibrotic agent  - encouraged to get 2nd COVID booster vaccine and hold MMF for 1 week after this  - up to date on covid series and covid booster, will get COVID ab to see if developed immunity    Follow-up in 3 months with labs before visit    Patient seen and evaluated with Dr. Ahuja

## 2022-04-11 ENCOUNTER — OFFICE VISIT (OUTPATIENT)
Dept: RHEUMATOLOGY | Facility: CLINIC | Age: 72
End: 2022-04-11
Payer: MEDICARE

## 2022-04-11 ENCOUNTER — OFFICE VISIT (OUTPATIENT)
Dept: GASTROENTEROLOGY | Facility: CLINIC | Age: 72
End: 2022-04-11
Payer: MEDICARE

## 2022-04-11 ENCOUNTER — TELEPHONE (OUTPATIENT)
Dept: GASTROENTEROLOGY | Facility: CLINIC | Age: 72
End: 2022-04-11
Payer: MEDICARE

## 2022-04-11 ENCOUNTER — PATIENT MESSAGE (OUTPATIENT)
Dept: GASTROENTEROLOGY | Facility: CLINIC | Age: 72
End: 2022-04-11

## 2022-04-11 ENCOUNTER — LAB VISIT (OUTPATIENT)
Dept: LAB | Facility: HOSPITAL | Age: 72
End: 2022-04-11
Attending: INTERNAL MEDICINE
Payer: MEDICARE

## 2022-04-11 VITALS
BODY MASS INDEX: 25.44 KG/M2 | HEART RATE: 79 BPM | HEIGHT: 65 IN | OXYGEN SATURATION: 100 % | WEIGHT: 152.69 LBS | SYSTOLIC BLOOD PRESSURE: 121 MMHG | DIASTOLIC BLOOD PRESSURE: 66 MMHG

## 2022-04-11 VITALS
DIASTOLIC BLOOD PRESSURE: 65 MMHG | SYSTOLIC BLOOD PRESSURE: 120 MMHG | WEIGHT: 154 LBS | BODY MASS INDEX: 25.66 KG/M2 | HEIGHT: 65 IN | HEART RATE: 80 BPM | TEMPERATURE: 97 F

## 2022-04-11 DIAGNOSIS — R15.9 INCONTINENCE OF FECES, UNSPECIFIED FECAL INCONTINENCE TYPE: ICD-10-CM

## 2022-04-11 DIAGNOSIS — E55.9 VITAMIN D DEFICIENCY: ICD-10-CM

## 2022-04-11 DIAGNOSIS — Z79.899 OTHER LONG TERM (CURRENT) DRUG THERAPY: ICD-10-CM

## 2022-04-11 DIAGNOSIS — R14.0 BLOATING: ICD-10-CM

## 2022-04-11 DIAGNOSIS — D64.9 ANEMIA, UNSPECIFIED TYPE: ICD-10-CM

## 2022-04-11 DIAGNOSIS — M85.80 OSTEOPENIA, UNSPECIFIED LOCATION: Primary | ICD-10-CM

## 2022-04-11 DIAGNOSIS — M34.9 SCLERODERMA: ICD-10-CM

## 2022-04-11 DIAGNOSIS — R11.0 NAUSEA: ICD-10-CM

## 2022-04-11 DIAGNOSIS — R19.7 DIARRHEA, UNSPECIFIED TYPE: ICD-10-CM

## 2022-04-11 DIAGNOSIS — J84.9 ILD (INTERSTITIAL LUNG DISEASE): ICD-10-CM

## 2022-04-11 DIAGNOSIS — Z78.0 ASYMPTOMATIC MENOPAUSAL STATE: ICD-10-CM

## 2022-04-11 DIAGNOSIS — I27.20 PULMONARY HYPERTENSION: ICD-10-CM

## 2022-04-11 DIAGNOSIS — K21.9 GASTROESOPHAGEAL REFLUX DISEASE, UNSPECIFIED WHETHER ESOPHAGITIS PRESENT: ICD-10-CM

## 2022-04-11 DIAGNOSIS — D50.9 IRON DEFICIENCY ANEMIA, UNSPECIFIED IRON DEFICIENCY ANEMIA TYPE: Primary | ICD-10-CM

## 2022-04-11 LAB
BASOPHILS # BLD AUTO: 0.04 K/UL (ref 0–0.2)
BASOPHILS NFR BLD: 0.7 % (ref 0–1.9)
DIFFERENTIAL METHOD: ABNORMAL
EOSINOPHIL # BLD AUTO: 0.1 K/UL (ref 0–0.5)
EOSINOPHIL NFR BLD: 1.3 % (ref 0–8)
ERYTHROCYTE [DISTWIDTH] IN BLOOD BY AUTOMATED COUNT: 15.2 % (ref 11.5–14.5)
FERRITIN SERPL-MCNC: 13 NG/ML (ref 20–300)
FOLATE SERPL-MCNC: 11.4 NG/ML (ref 4–24)
HAPTOGLOB SERPL-MCNC: 160 MG/DL (ref 30–250)
HCT VFR BLD AUTO: 36.1 % (ref 37–48.5)
HGB BLD-MCNC: 12 G/DL (ref 12–16)
IMM GRANULOCYTES # BLD AUTO: 0.01 K/UL (ref 0–0.04)
IMM GRANULOCYTES NFR BLD AUTO: 0.2 % (ref 0–0.5)
IRON SERPL-MCNC: 39 UG/DL (ref 30–160)
LDH SERPL L TO P-CCNC: 184 U/L (ref 110–260)
LYMPHOCYTES # BLD AUTO: 1.3 K/UL (ref 1–4.8)
LYMPHOCYTES NFR BLD: 24.6 % (ref 18–48)
MCH RBC QN AUTO: 31.2 PG (ref 27–31)
MCHC RBC AUTO-ENTMCNC: 33.2 G/DL (ref 32–36)
MCV RBC AUTO: 94 FL (ref 82–98)
MONOCYTES # BLD AUTO: 0.3 K/UL (ref 0.3–1)
MONOCYTES NFR BLD: 5.9 % (ref 4–15)
NEUTROPHILS # BLD AUTO: 3.7 K/UL (ref 1.8–7.7)
NEUTROPHILS NFR BLD: 67.3 % (ref 38–73)
NRBC BLD-RTO: 0 /100 WBC
PATH REV BLD -IMP: NORMAL
PLATELET # BLD AUTO: 196 K/UL (ref 150–450)
PMV BLD AUTO: 11.8 FL (ref 9.2–12.9)
RBC # BLD AUTO: 3.85 M/UL (ref 4–5.4)
RETICS/RBC NFR AUTO: 1.8 % (ref 0.5–2.5)
SATURATED IRON: 8 % (ref 20–50)
TOTAL IRON BINDING CAPACITY: 482 UG/DL (ref 250–450)
TRANSFERRIN SERPL-MCNC: 326 MG/DL (ref 200–375)
VIT B12 SERPL-MCNC: 520 PG/ML (ref 210–950)
WBC # BLD AUTO: 5.44 K/UL (ref 3.9–12.7)

## 2022-04-11 PROCEDURE — 99214 PR OFFICE/OUTPT VISIT, EST, LEVL IV, 30-39 MIN: ICD-10-PCS | Mod: S$PBB,GC,, | Performed by: STUDENT IN AN ORGANIZED HEALTH CARE EDUCATION/TRAINING PROGRAM

## 2022-04-11 PROCEDURE — 85060 BLOOD SMEAR INTERPRETATION: CPT | Mod: ,,, | Performed by: PATHOLOGY

## 2022-04-11 PROCEDURE — 85025 COMPLETE CBC W/AUTO DIFF WBC: CPT | Performed by: STUDENT IN AN ORGANIZED HEALTH CARE EDUCATION/TRAINING PROGRAM

## 2022-04-11 PROCEDURE — 84466 ASSAY OF TRANSFERRIN: CPT | Performed by: INTERNAL MEDICINE

## 2022-04-11 PROCEDURE — 99214 OFFICE O/P EST MOD 30 MIN: CPT | Mod: S$PBB,GC,, | Performed by: STUDENT IN AN ORGANIZED HEALTH CARE EDUCATION/TRAINING PROGRAM

## 2022-04-11 PROCEDURE — 83615 LACTATE (LD) (LDH) ENZYME: CPT | Performed by: INTERNAL MEDICINE

## 2022-04-11 PROCEDURE — 99213 OFFICE O/P EST LOW 20 MIN: CPT | Mod: PBBFAC | Performed by: INTERNAL MEDICINE

## 2022-04-11 PROCEDURE — 85045 AUTOMATED RETICULOCYTE COUNT: CPT | Performed by: INTERNAL MEDICINE

## 2022-04-11 PROCEDURE — 36415 COLL VENOUS BLD VENIPUNCTURE: CPT | Performed by: INTERNAL MEDICINE

## 2022-04-11 PROCEDURE — 82607 VITAMIN B-12: CPT | Performed by: INTERNAL MEDICINE

## 2022-04-11 PROCEDURE — 85060 PATHOLOGIST REVIEW: ICD-10-PCS | Mod: ,,, | Performed by: PATHOLOGY

## 2022-04-11 PROCEDURE — 99215 OFFICE O/P EST HI 40 MIN: CPT | Mod: PBBFAC,27 | Performed by: STUDENT IN AN ORGANIZED HEALTH CARE EDUCATION/TRAINING PROGRAM

## 2022-04-11 PROCEDURE — 99999 PR PBB SHADOW E&M-EST. PATIENT-LVL III: CPT | Mod: PBBFAC,,, | Performed by: INTERNAL MEDICINE

## 2022-04-11 PROCEDURE — 83010 ASSAY OF HAPTOGLOBIN QUANT: CPT | Performed by: INTERNAL MEDICINE

## 2022-04-11 PROCEDURE — 82728 ASSAY OF FERRITIN: CPT | Performed by: INTERNAL MEDICINE

## 2022-04-11 PROCEDURE — 99999 PR PBB SHADOW E&M-EST. PATIENT-LVL III: ICD-10-PCS | Mod: PBBFAC,,, | Performed by: INTERNAL MEDICINE

## 2022-04-11 PROCEDURE — 99999 PR PBB SHADOW E&M-EST. PATIENT-LVL V: CPT | Mod: PBBFAC,GC,, | Performed by: STUDENT IN AN ORGANIZED HEALTH CARE EDUCATION/TRAINING PROGRAM

## 2022-04-11 PROCEDURE — 99999 PR PBB SHADOW E&M-EST. PATIENT-LVL V: ICD-10-PCS | Mod: PBBFAC,GC,, | Performed by: STUDENT IN AN ORGANIZED HEALTH CARE EDUCATION/TRAINING PROGRAM

## 2022-04-11 PROCEDURE — 99213 PR OFFICE/OUTPT VISIT, EST, LEVL III, 20-29 MIN: ICD-10-PCS | Mod: S$PBB,,, | Performed by: INTERNAL MEDICINE

## 2022-04-11 PROCEDURE — 82746 ASSAY OF FOLIC ACID SERUM: CPT | Performed by: INTERNAL MEDICINE

## 2022-04-11 PROCEDURE — 99213 OFFICE O/P EST LOW 20 MIN: CPT | Mod: S$PBB,,, | Performed by: INTERNAL MEDICINE

## 2022-04-11 RX ORDER — FLUORIDE (SODIUM) 1.1 %
PASTE (ML) DENTAL
COMMUNITY
Start: 2022-02-14

## 2022-04-11 NOTE — PROGRESS NOTES
Pre chart    Answers for HPI/ROS submitted by the patient on 4/7/2022  fever: No  eye redness: No  mouth sores: No  headaches: No  shortness of breath: No  chest pain: No  trouble swallowing: Yes  diarrhea: No  constipation: No  unexpected weight change: No  genital sore: No  dysuria: No  During the last 3 days, have you had a skin rash?: No  Bruises or bleeds easily: Yes  cough: No

## 2022-04-11 NOTE — TELEPHONE ENCOUNTER
[facility] PROCEDURE ORDERS    CLEARANCE FOR PROCEDURES:  [] Not needed   [] Cardiology   [] Pulmonary  [] PCP    PROCEDURES  [] EGD   [] Colonoscopy   [] EGD with EndoFlip   [] Esophageal manometry with impedance - dysphagia   [] Esophageal manometry with impedance - rumination  [] Esophageal manometry with impedance - belching   [] EGD with BRAVO 48 hours   [] EGD with BRAVO 96 hours   [] pH Impedance 24 hours   [] Anorectal manometry   [] Rectal Ultrasound     Does manometry need to be placed endoscopically:   [] Yes    FLOOR:  [] 4th Floor   [] 2nd Floor    Reason for 2nd Floor:   [] Gastroparesis   [] End stage achalasia  [] Pre lung transplant   [] Pre hear transplant   [] Pulmonary HTN (PAP>50 or on meds)   [] Severe pulmonary Disease   [] Complex medical problems   [] BMI>50  [] History of anesthesia issues  [] Other:    PREP  [] Standard Prep  [] Severe Constipation Prep (Low residue diet 7 days.  1.5 prep the day prior, 0.5 prep the day of procedure)  [] Full liquid diet 5 days   [] Full liquid diet 3 days   [] Full liquid diet 2 days   [] Full liquid diet 1 day   [] Other:     MEDICATIONS    pH Studies  [] ON PPI/H2 Blocker   [] OFF PPI/H2 Blocker     Metal allergy (stainless steal w chromium, nickel, copper, cobalt and iron).:   []  Yes    Pacemaker/defibrillator:  [] Yes    Motility Studies (esophageal manometry/anorectal manometry)  Hold narcotics x 1 days if able   Hold TCA x 1 days if able  Propofol/lidocaine only during sedation.  Discuss with Dr. Mendoza if additional sedation needed.   Hold baclofen for thee days (at least one day) if able   Hold muscle relaxants x 1 day if able     Anticoagulation/antiplt agents:   []  Yes    ORDER OF TESTING:  Day 1:   Day 2:  Day 3:  Day 4:     1 week after:  2 weeks after:   4 weeks after:     [] No special requirements - pt lives locally     SCHEDULING PRIORITY  [] Urgent  (<7 days)  [] Priority (<30 days)  [] Routine 1 (schedule ASAP)  [] Routine 2 (next  available, < 3 months)   [] Routine 3 (ok if > 3 months)       PHYSICIAN  []  Ok with Dr. Benites   []  Ok with any GI MD       [facility] PROCEDURE ORDERS    CLEARANCE FOR PROCEDURES:  [x] Not needed   [] Cardiology   [] Pulmonary  [] PCP    PROCEDURES  [x] EGD   [] Colonoscopy   [] EGD with EndoFlip   [] Esophageal manometry with impedance - dysphagia   [] Esophageal manometry with impedance - rumination  [] Esophageal manometry with impedance - belching   [] EGD with BRAVO 48 hours   [] EGD with BRAVO 96 hours   [] pH Impedance 24 hours   [] Anorectal manometry   [] Rectal Ultrasound     Does manometry need to be placed endoscopically:   [] Yes    FLOOR:  [] 4th Floor   [x] 2nd Floor    Reason for 2nd Floor:   [x] Gastroparesis   [] End stage achalasia  [] Pre lung transplant   [] Pre hear transplant   [] Pulmonary HTN (PAP>50 or on meds)   [] Severe pulmonary Disease   [] Complex medical problems   [] BMI>50  [] History of anesthesia issues  [] Other:    PREP  [] Standard Prep  [] Severe Constipation Prep (Low residue diet 7 days.  1.5 prep the day prior, 0.5 prep the day of procedure)  [] Full liquid diet 5 days   [x] Full liquid diet 3 days   [] Full liquid diet 2 days   [] Full liquid diet 1 day   [] Other:     MEDICATIONS    pH Studies  [] ON PPI/H2 Blocker   [] OFF PPI/H2 Blocker     Metal allergy (stainless steal w chromium, nickel, copper, cobalt and iron).:   []  Yes    Pacemaker/defibrillator:  [] Yes    Motility Studies (esophageal manometry/anorectal manometry)  Hold narcotics x 1 days if able   Hold TCA x 1 days if able  Propofol/lidocaine only during sedation.  Discuss with Dr. Mendoza if additional sedation needed.   Hold baclofen for thee days (at least one day) if able   Hold muscle relaxants x 1 day if able     Anticoagulation/antiplt agents:   []  Yes    ORDER OF TESTING:  Day 1: EGD     [] No special requirements - pt lives locally     SCHEDULING PRIORITY  [] Urgent  (<7 days)  [x] Priority  (<30 days)  [] Routine 1 (schedule ASAP)  [] Routine 2 (next available, < 3 months)   [] Routine 3 (ok if > 3 months)       PHYSICIAN  []  Ok with Dr. Benites   []  Ok with any GI MD

## 2022-04-11 NOTE — PROGRESS NOTES
[facility] Visit    Reason for Visit:  The primary encounter diagnosis was Iron deficiency anemia, unspecified iron deficiency anemia type. Diagnoses of Gastroesophageal reflux disease, unspecified whether esophagitis present, Diarrhea, unspecified type, Incontinence of feces, unspecified fecal incontinence type, Bloating, and Nausea were also pertinent to this visit.    PCP: Aly Rand     Ref: Jeannette Pringle NP    HPI:This is a 71 y.o. Female with a PMH of  scleroderma, ILD, PHTN here today for the following GI symptoms:    GERD.   Pyrosis. 3 per week   Regurgitation. 3 per week   [facility]ough[facility] at [facility]  Nocturnal reflux    MEDs:  aciphex 20 BID  Tums prn  Gaviscon prn     Dysphagia. Improved. Occasional     Dry mouth.   using biotin    Gastroparesis.   Denies nausea, vomiting   Early satiety occasional     Following GP diet.   Seen by dietitian re gp diet     Abd pain.   Better w BMs  Associated w distention   Improved   No meds     Bloating and gas.Improved  Consumes some lactose  Avoids artificial sugars.    Lactase deficiency  Following lactose free diet-feels better     Constipation   Normal Bowel movements.   Vermilion type 4  BM 2/day     Med:  No longer on motegrity  No miralax     Fecal incontinece. Occasional   Completed PT w improvement  Not interested in interstim at this time     Anemia. ROXANNE. No overt bleeding.   Ho IV iron w normalization of anemia   No longer on iron, unsure when stopped   Rheum labs pending     Denies diarrhea, melena, BRBPR.    Weight gain.   Stable now. 153 from 157      Total visit time was 23 minutes, more than 50% of which was spent in face-to-face counseling with patient regarding symptoms, diagnostic results, prognosis, risks and benefits of treatment options, instructions for management, stress reduction, coping strategies and coordination of care.        Previous Studies:  Rectum manometry 05/12/2021:  Low basal sphincter pressure.  Inadequate squeeze.   Dyssynergic defecation type 2.  Hypersensitivity in rectum.  Rare present.  Patient unable to evacuate 50 cc balloon.  Colonoscopy 05/10/2021:  Good prep to cecum.  5 mm polyp in ascending colon (TA).  6 mm polyp descending colon (-).  4 2-4 mm polyps at rectosigmoid (Ta + HP).  Nonbleeding internal hemorrhoids.  Repeat in 5 years.  EGD 02/02/2021:  Normal esophagus.  Grade a esophagitis.  Z line irregular (Intestinal metaplasia).  2 cm hiatal hernia.  Erythema in the body (-).  Few gastric polyps (FG).  Normal duodenum (mild increased intraepithelial lymphocytes (see comment, Dissach: lactase deficiency).  Gastric emptying study 05/04/2020:  Delayed gastric emptying.  For hour % retention of 25%.  PH impedance 03/06/2020:  Significant acid reflux of PI.  Total % pH less than 4 was 41.  DeMeester 133.  Manometry 03/04/2020:  Low LES pressure with complete relaxation.  Absent contractility.  Incomplete bolus clearance.  Hiatal hernia.  Abnormal multiple rapid swallow test.  Unable to perform apple and nancy cracker swallows.  Evidence of residual liquid after 150 cc bolus  MBSS 10/31/19:Flash penetration, no aspiration.  Moderate vallecular pooling, mild Piriforms sinus pooling. The results of this MBSS indicate that patient demonstrates moderate swallowing dysfunction c/b intermittent silent penetration of thin liquids as well as moderate pharyngeal residue across consistencies. No aspiration observed. Given patient's esophageal symptoms and her history of scleroderma, prognosis for improvement of swallowing with therapy is good/ fair.   -Diet: recommend thin liquids and regular as tolerated.   -Therapeutic technique: recommend small bites/sips, alternate solid with liquid wash and multiple swallows per bolus.  -Dysphagia therapy, for 4-6 sessions over the course of 4-6 weeks, in order to increase tongue base retraction, increase pharyngeal contraction and increase swallow safety by implementing therapeutic  maneuvers.   -Repeat MBSS in 6-8 weeks for reeval after completion of dysphagia therapy.  -Contact clinician or referring provider for repeat MBSS if swallowing status changes or worsens.  -Recommend follow-up with GI.   3/29/19 EGD:dilation in the entire esophagus. irreg zline (-). 3 cm HH. Benign appearing esophageal stenosis. Nl stomach. Few gastric polyps (hp). Nl duodenum (-).   3/29/19 colon:GPTTI. Five 2-5 mm TC polyps (TAs). Rpt in 3 yrs  1/26/2017 EGD L[facility]i: NL esophagus(-).  irrg z line (-). Nl stomach. Duodenal congestion (mild chr inflamm). Esophageal polyps (hp). Rpt 6 months to check for complete removal.   2014 EGD Dr. Gusman-GEJ polyps. bx-chronic inflammation;hyperplastic change. Repeat in 6 months for surveillance.   2014 colonoscopy Dr. Gusman - 3 mm descending colon polyp. Medium internal hemorrhoids. path- fecal matter, no human tissue present. Repeat in 5 years.  2011 EGD [facility]- HH. White nummular esophageal lesions. irregular z line. Esophageal nodule. Path- mild chronic gastritis. No IM. Esophageal candida.  2011 colonoscopy [facility]- sigmoid tics. Internal hemorrhoids. 4 mm ascending colon polyp. Path-hyperplastic polyp. repeat in 5 years.   2009 EGD [facility]- Schatzki ring, esophogeal nodule, HH, irregular z line. Path-chronic gastritis  2007 colonoscopy [facility]- WNL  [facility]-chronic inflammation. No IM.      ROS:  Constitutional: No fevers, no chills, +weight loss,   ENT: No allergies  CV: No chest pain, no palpitations, +  perif. edema, no sob on exertion  Pulm: No cough, no shortness of breath, + wheezes, no sputum  Ophtho: No vision changes  GI: see HPI; also no nausea, no vomiting, no change in appetite  Derm: No rash  Heme: No lymphadenopathy, No bruising  MSK: + arthritis, no muscle pain, no muscle weakness  : No dysuria, No hematuria  Endo: No hot or cold intolerance  Neuro: No syncope, No seizure,     PMHX:  has a past medical history of Abnormal Pap  smear, Acid reflux, Allergy, Arthritis, Encounter for blood transfusion, GERD (gastroesophageal reflux disease), History of migraine headaches, colonic polyp, Hypertension, Idiopathic neuropathy (7/20/2012), ILD (interstitial lung disease) (11/6/2013), Iron deficiency anemia (3/18/2014), MRSA carrier, Osteopenia, Pneumonia, Pulmonary fibrosis, Pulmonary hypertension, Raynaud's disease, Scleroderma, diffuse, Sjogren's syndrome, and Vitamin D deficiency (11/14/2013).    PSHX:  has a past surgical history that includes Dilation and curettage of uterus; Breast biopsy; Cervical conization w/ laser (1970); Hysterectomy (1990); Esophagogastroduodenoscopy; Colonoscopy; Esophagogastroduodenoscopy (N/A, 3/29/2019); Colonoscopy (N/A, 3/29/2019); Esophageal manometry with measurement of impedance (N/A, 3/4/2020); 24 hour impedance pH monitoring of esophagus in patient not taking acid reducing medications (N/A, 3/4/2020); Varicose vein surgery; Right heart catheterization (Right, 1/5/2021); Esophagogastroduodenoscopy (N/A, 2/2/2021); Colonoscopy (N/A, 5/10/2021); and Anorectal manometry (N/A, 5/10/2021).    The patient's social and family histories were reviewed by me and updated in the appropriate section of the electronic medical record.    Review of patient's allergies indicates:   Allergen Reactions    Sulfa (sulfonamide antibiotics)      Other reaction(s): Rash       Current Outpatient Medications   Medication Sig    acetaminophen-codeine 300-30mg (TYLENOL #3) 300-30 mg Tab Take 1 tablet by mouth daily as needed.    albuterol (VENTOLIN HFA) 90 mcg/actuation inhaler Inhale 2 puffs into the lungs every 4 (four) hours as needed for Wheezing. Rescue    carboxymethylcellulose sodium (REFRESH TEARS) 0.5 % Drop Apply 1-2 drops to every as needed    ergocalciferol (ERGOCALCIFEROL) 50,000 unit Cap TAKE 1 CAPSULE BY MOUTH EVERY 7 DAYS    ferrous sulfate 325 (65 FE) MG EC tablet Take 325 mg by mouth 2 (two) times daily.     "loratadine (CLARITIN) 10 mg tablet Take 1 tablet (10 mg total) by mouth once daily.    multivitamin capsule Take 1 capsule by mouth once daily.    mycophenolate mofetil (CELLCEPT) 200 mg/mL SusR Take 5 mLs (1,000 mg total) by mouth 2 (two) times daily.    nabumetone (RELAFEN) 750 MG tablet Take 1 tablet (750 mg total) by mouth 2 (two) times daily as needed for Pain.    NIFEdipine (ADALAT CC) 90 MG TbSR TAKE 1 TABLET(90 MG) BY MOUTH EVERY DAY    NIFEdipine (PROCARDIA-XL) 30 MG (OSM) 24 hr tablet Take 1 tablet (30 mg total) by mouth once daily.    pravastatin (PRAVACHOL) 20 MG tablet TAKE 1 TABLET(20 MG) BY MOUTH EVERY EVENING    pregabalin (LYRICA) 300 MG Cap Take 1 capsule (300 mg total) by mouth 2 (two) times daily.    PREVIDENT 5000 BOOSTER PLUS 1.1 % Pste SMARTSIG:To Teeth    PREVIDENT 5000 SENSITIVE 1.1-5 % Pste BRUSH FOR 2 MINUTES TWICE PER DAY    RABEprazole (ACIPHEX) 20 mg tablet Take 1 tablet (20 mg total) by mouth 2 (two) times daily.    sodium chloride 0.9% 0.9 % irrigation Irrigate with 2 mLs as directed daily as needed (wound cleaning).    tadalafil (ADCIRCA) 20 mg Tab Take 2 tablets (40 mg total) by mouth once daily.    tiZANidine (ZANAFLEX) 4 MG tablet Take 1 tablet (4 mg total) by mouth nightly as needed (muscle pain).    cadexomer iodine (IODOSORB) 0.9 % gel Apply topically daily as needed for Wound Care. (Patient not taking: Reported on 4/11/2022)     Current Facility-Administered Medications   Medication    acetaminophen tablet 650 mg    albuterol inhaler 2 puff    diphenhydrAMINE injection 25 mg    EPINEPHrine (EPIPEN) 0.3 mg/0.3 mL pen injection 0.3 mg    methylPREDNISolone sodium succinate injection 40 mg    ondansetron disintegrating tablet 4 mg    sodium chloride 0.9% 500 mL flush bag    sodium chloride 0.9% flush 10 mL         Objective Findings:    Vital Signs:  /66   Pulse 79   Ht 5' 5" (1.651 m)   Wt 69.3 kg (152 lb 11.2 oz)   SpO2 100%   BMI 25.41 kg/m²  " Body mass index is 25.41 kg/m².    Physical Exam:   General appearance: alert, cooperative, no distress, evidence of systemic sclerosis w  narrow oral aperture.   HENT: Normocephalic, atraumatic, neck symmetrical, no nasal discharge  Eyes: conjunctivae/corneas clear, PERRL, EOM's intact  Lungs: clear to auscultation bilaterally, no dullness to percussion bilaterally  Heart: regular rate and rhythm without rub; no displacement of the PMI  Abdomen: soft, non-tender; bowel sounds normoactive; no organomegaly  Extremities: extremities symmetric; no clubbing, cyanosis, or edema,  raynauds,   sclerodactyly, osteolysis of digits, digital ulcers.  Integument: Skin color, texture, turgor normal; no rashes; hair distrubution normal,  telangectasia, tightness of skin.    Neurologic: Alert and oriented X 3, normal strength, normal coordination and gait  Psychiatric: no pressured speech; normal affect; no evidence of impaired cognition        Labs:  Lab Results   Component Value Date    WBC 5.40 04/07/2022    HGB 10.6 (L) 04/07/2022    HCT 33.3 (L) 04/07/2022     04/07/2022    CHOL 125 05/10/2019    TRIG 87 05/10/2019    HDL 43 05/10/2019    ALT 7 (L) 04/07/2022    AST 14 04/07/2022     04/07/2022    K 4.2 04/07/2022     04/07/2022    CREATININE 0.7 04/07/2022    BUN 19 04/07/2022    CO2 25 04/07/2022    TSH 1.371 12/08/2020    INR 1.0 06/29/2015    HGBA1C 5.4 12/10/2020         Assessment and plan:  This is a 71 y.o. Female with a PMH of  scleroderma, ILD, PHTN here today for the following GI symptoms:    GERD.  Severe GERD on Ph impedance  Ho aspiration pneumonia x 4 and pulmonolgy/rheumatology is concerned it may be due to reflux  No improvement with omeprazole 40 mg BID  Unable to get dexilant 30mg BID  -aciphex 20mg BID   -Gaviscon w alginate PRN.  -Optimize gastroapresis     New Barretts Esophagus.  No dysplasia  -Repeat EGD w GEJ bx in 3 years - 02/02/2024:      Dysphagia. Esophageal manometry w  scleroderma esophagus  Improvement with dilation in the past.   -Swallowing precautions    Oropharyngeal dysphagia.   MBSS with moderate swallowing dysfunction. No aspiration observed.Recs: Dysphagia therapy, for 4-6 sessions over the course of 4-6 weeks, in order to increase tongue base retraction, increase pharyngeal contraction and increase swallow safety by implementing therapeutic maneuvers. Repeat MBSS in 6-8 weeks for reeval after completion of dysphagia therapy.  -Never went to speech therapy, but doing well right now.      Dry mouth.   -biotin    History of  esophageal candida    Gastroparesis. Early satiety.  No nausea or vomiting.   -GP diet.   -Fu w gi dietitian   -Stop prucalopride due to increased FI     Epigastric discomfort. Lower abd discomfort related to BMs.  Resolved     Gas and bloating. distention.   Lactase deficiency   -Avoids artificial sugars.  -eliminate lactose  -Eliminate honey     Constipation.  Resolved    Fecal incontinence.   ARM abnormal  -Stoped prucalopride due to increased FI  -Improved w PT  -Not interested in Inter Stim     Recurrent ROXANNE off iron .   Egd/Colon negative 2021. VCE negative 2021     -EGD w GEJ bx  -Rheum labs pending   -Fu w rheumatology to treat w IV vs PO iron      Weigh loss  -Shakes daily     Duodenum w increased lymphocytes, nl villi on gluten.  TTG IGA negative on gluten  HLA Dq2/8 negative     Colon polyps.   -C-scope repeat 5/2026    Systemic Sclerosis      Follow up in about 4 months (around 8/11/2022).      1. Iron deficiency anemia, unspecified iron deficiency anemia type    2. Gastroesophageal reflux disease, unspecified whether esophagitis present    3. Diarrhea, unspecified type    4. Incontinence of feces, unspecified fecal incontinence type    5. Bloating    6. Nausea        Orders Placed This Encounter   Procedures    Case Request Endoscopy: ESOPHAGOGASTRODUODENOSCOPY (EGD)     Order Specific Question:   CPT Code:     Answer:   RI EGD, FLEX,  DIAGNOSTIC [[id number]]     Order Specific Question:   Case Referring Provider     Answer:   ELENO AMBRIZ [[id number]]     Order Specific Question:   Medical Necessity:     Answer:   Medically Urgent [101]     Order Specific Question:   Is an on-site pathologist required for this procedure?     Answer:   N/A         Thank you for allowing me to participate in the care of Yamel Ambriz MD

## 2022-04-11 NOTE — PATIENT INSTRUCTIONS
-Drink high protein, lactose shake daily to help maintain your weight and nutrition     These lifestyle modifications may help with acid reflux at night   -Elevate the head of the bed.  This can be achieved by purchasing an adjustable bed frame or by putting six- to eight-inch blocks under the legs at the head of the bed or a styrofoam wedge under the mattress.   -Refrain from laying down after meals  -Avoid meals two to three hours before bedtime    You can try the following products to help with dry mouth.  This should improve your difficulty swallowing as saliva is needed to lubricate food.    -Biotene (artificial saliva) 3-4 times per day  prior to meals or as needed for dry mouth.  -Xylimelts.  You can find these in local pharmacy, such as [facility] or on internet.  -Lozenge - Maynard's Breezers, ACT dry mouth lozenges sold with cough drops  -Therabreath mouthwash  -ACT mouthwash  -Grether's Pastilles, these are usually found on internet      -Eliminate all forms of dairy/lactose (milk, cheese, butter, creamers, ice cream etc) from your diet for 14 days to see if your symptoms improve.    -Complete EGD to evaluate anemia   EGD is an endoscopic procedure that allows your doctor to examine your esophagus, stomach and duodenum (part of your small intestine). EGD is an outpatient procedure, meaning you can go home that same day. It takes approximately 30 to 60 minutes to perform.    -Follow up with Rheumatology to discuss IV iron treatment vs iron pills

## 2022-04-12 LAB — PATH REV BLD -IMP: NORMAL

## 2022-04-12 NOTE — TELEPHONE ENCOUNTER
Pt does not want [facility] to call her for refills. She still plans to fill cellcept with [facility], but she will call when she needs a refill.

## 2022-04-14 ENCOUNTER — TELEPHONE (OUTPATIENT)
Dept: PAIN MEDICINE | Facility: CLINIC | Age: 72
End: 2022-04-14
Payer: MEDICARE

## 2022-04-14 ENCOUNTER — TELEPHONE (OUTPATIENT)
Dept: PULMONOLOGY | Facility: CLINIC | Age: 72
End: 2022-04-14
Payer: MEDICARE

## 2022-04-14 NOTE — TELEPHONE ENCOUNTER
Staff called pt to confirmed appt. Patient stated she would like to cancel and will call  to r/s. Staff verbalized understanding and confirmed. Sg

## 2022-04-14 NOTE — TELEPHONE ENCOUNTER
----- Message from Leobardo Escamilla sent at 4/13/2022 12:05 PM CDT -----  Regarding: Nia with [facility]      The Caller states that the script that was sent out wasn't what was ordered before and the Qty ordered doesn't match with the days.    The caller states that you ordered a Qty of 60 for 30 Days, as the Pt will pay a $65.00 copay for the 14 day supply that was ordered and it is the same copay for the 30 day supply.    Please contact the pharmacy to discuss.    Pharmacy Ph # [phone] (anyone can help, just ask for a pharmacist)

## 2022-04-14 NOTE — TELEPHONE ENCOUNTER
I spoke to pharmacist, Ramses. They were calling to correct rx  tadalafil (ADCIRCA) 20 mg Tab changed to qty 60 for 30 days with 11 refills. It was sent for qty 28 for 14 days. Ramses verbalized understanding.

## 2022-04-18 ENCOUNTER — PATIENT MESSAGE (OUTPATIENT)
Dept: ADMINISTRATIVE | Facility: OTHER | Age: 72
End: 2022-04-18
Payer: MEDICARE

## 2022-04-21 ENCOUNTER — IMMUNIZATION (OUTPATIENT)
Dept: PHARMACY | Facility: CLINIC | Age: 72
End: 2022-04-21
Payer: MEDICARE

## 2022-04-21 DIAGNOSIS — Z23 NEED FOR VACCINATION: Primary | ICD-10-CM

## 2022-04-27 ENCOUNTER — PATIENT MESSAGE (OUTPATIENT)
Dept: INTERNAL MEDICINE | Facility: CLINIC | Age: 72
End: 2022-04-27
Payer: MEDICARE

## 2022-04-27 DIAGNOSIS — Z00.00 ANNUAL PHYSICAL EXAM: Primary | ICD-10-CM

## 2022-04-27 DIAGNOSIS — Z12.31 BREAST CANCER SCREENING BY MAMMOGRAM: ICD-10-CM

## 2022-04-28 ENCOUNTER — DOCUMENTATION ONLY (OUTPATIENT)
Dept: TRANSPLANT | Facility: CLINIC | Age: 72
End: 2022-04-28
Payer: MEDICARE

## 2022-04-29 ENCOUNTER — SPECIALTY PHARMACY (OUTPATIENT)
Dept: PHARMACY | Facility: CLINIC | Age: 72
End: 2022-04-29
Payer: MEDICARE

## 2022-04-29 NOTE — TELEPHONE ENCOUNTER
Specialty Pharmacy - Refill Coordination    Specialty Medication Orders Linked to Encounter    Flowsheet Row Most Recent Value   Medication #1 mycophenolate mofetil (CELLCEPT) 200 mg/mL SusR (Order#[id number], Rx#[id number])          Refill Questions - Documented Responses    Flowsheet Row Most Recent Value   Patient Availability and HIPAA Verification    Does patient want to proceed with activity? Yes   HIPAA/medical authority confirmed? Yes   Relationship to patient of person spoken to? Self   Refill Screening Questions    Would patient like to speak to a pharmacist? No   When does the patient need to receive the medication? 04/29/22   Refill Delivery Questions    How will the patient receive the medication? Pickup   When does the patient need to receive the medication? 04/29/22   Address in [facility] confirmed and updated if neccessary? No   Expected Copay ($) 15   Is the patient able to afford the medication copay? Yes   Payment Method CC on file   Days supply of Refill 48   Supplies needed? No supplies needed   Refill activity completed? Yes   Refill activity plan Refill scheduled   Shipment/Pickup Date: 04/29/22          Current Outpatient Medications   Medication Sig    albuterol (VENTOLIN HFA) 90 mcg/actuation inhaler Inhale 2 puffs into the lungs every 4 (four) hours as needed for Wheezing. Rescue    cadexomer iodine (IODOSORB) 0.9 % gel Apply topically daily as needed for Wound Care. (Patient not taking: Reported on 4/11/2022)    carboxymethylcellulose sodium (REFRESH TEARS) 0.5 % Drop Apply 1-2 drops to every as needed    ergocalciferol (ERGOCALCIFEROL) 50,000 unit Cap TAKE 1 CAPSULE BY MOUTH EVERY 7 DAYS    ferrous sulfate 325 (65 FE) MG EC tablet Take 325 mg by mouth 2 (two) times daily.    loratadine (CLARITIN) 10 mg tablet Take 1 tablet (10 mg total) by mouth once daily.    multivitamin capsule Take 1 capsule by mouth once daily.    mycophenolate mofetil (CELLCEPT) 200 mg/mL SusR Take  5 mLs (1,000 mg total) by mouth 2 (two) times daily.    nabumetone (RELAFEN) 750 MG tablet Take 1 tablet (750 mg total) by mouth 2 (two) times daily as needed for Pain.    NIFEdipine (ADALAT CC) 90 MG TbSR TAKE 1 TABLET(90 MG) BY MOUTH EVERY DAY    NIFEdipine (PROCARDIA-XL) 30 MG (OSM) 24 hr tablet Take 1 tablet (30 mg total) by mouth once daily.    pravastatin (PRAVACHOL) 20 MG tablet TAKE 1 TABLET(20 MG) BY MOUTH EVERY EVENING    pregabalin (LYRICA) 300 MG Cap Take 1 capsule (300 mg total) by mouth 2 (two) times daily.    PREVIDENT 5000 BOOSTER PLUS 1.1 % Pste SMARTSIG:To Teeth    PREVIDENT 5000 SENSITIVE 1.1-5 % Pste BRUSH FOR 2 MINUTES TWICE PER DAY    RABEprazole (ACIPHEX) 20 mg tablet Take 1 tablet (20 mg total) by mouth 2 (two) times daily.    sars-cov-2, covid-19, (MODERNA COVID-19) 50 mcg/0.25 ml injection (BOOSTER) Inject into the muscle.    sodium chloride 0.9% 0.9 % irrigation Irrigate with 2 mLs as directed daily as needed (wound cleaning).    tadalafil (ADCIRCA) 20 mg Tab Take 2 tablets (40 mg total) by mouth once daily.    tiZANidine (ZANAFLEX) 4 MG tablet Take 1 tablet (4 mg total) by mouth nightly as needed (muscle pain).   Last reviewed on 4/11/2022  9:58 AM by Judith Lawrence MA    Review of patient's allergies indicates:   Allergen Reactions    Sulfa (sulfonamide antibiotics)      Other reaction(s): Rash    Tramadol Itching    Last reviewed on  4/11/2022 9:50 AM by Judith Lawrence      Tasks added this encounter   No tasks added.   Tasks due within next 3 months   3/12/2022 - Refill Call (Auto Added)     Mario Blake, PharmD  [facility]  [address] [place] [id number]  Phone: [phone]  Fax: [phone]

## 2022-05-03 ENCOUNTER — OFFICE VISIT (OUTPATIENT)
Dept: TRANSPLANT | Facility: CLINIC | Age: 72
End: 2022-05-03
Payer: MEDICARE

## 2022-05-03 ENCOUNTER — HOSPITAL ENCOUNTER (OUTPATIENT)
Dept: PULMONOLOGY | Facility: CLINIC | Age: 72
Discharge: HOME | End: 2022-05-03
Payer: MEDICARE

## 2022-05-03 VITALS
BODY MASS INDEX: 25.83 KG/M2 | DIASTOLIC BLOOD PRESSURE: 58 MMHG | SYSTOLIC BLOOD PRESSURE: 128 MMHG | TEMPERATURE: 97 F | HEART RATE: 74 BPM | WEIGHT: 155 LBS | OXYGEN SATURATION: 99 % | RESPIRATION RATE: 20 BRPM | HEIGHT: 65 IN

## 2022-05-03 VITALS — HEIGHT: 65 IN | BODY MASS INDEX: 25.83 KG/M2 | WEIGHT: 155 LBS

## 2022-05-03 DIAGNOSIS — M34.9 SCLERODERMA WITH PULMONARY INVOLVEMENT: ICD-10-CM

## 2022-05-03 DIAGNOSIS — I27.20 PULMONARY HYPERTENSION: Primary | ICD-10-CM

## 2022-05-03 DIAGNOSIS — I27.29 PULMONARY HYPERTENSION ASSOCIATED WITH SYSTEMIC DISORDER: ICD-10-CM

## 2022-05-03 DIAGNOSIS — J84.9 ILD (INTERSTITIAL LUNG DISEASE): ICD-10-CM

## 2022-05-03 PROCEDURE — 99213 OFFICE O/P EST LOW 20 MIN: CPT | Mod: PBBFAC,TXP | Performed by: INTERNAL MEDICINE

## 2022-05-03 PROCEDURE — 99214 PR OFFICE/OUTPT VISIT, EST, LEVL IV, 30-39 MIN: ICD-10-PCS | Mod: S$PBB,NTX,, | Performed by: INTERNAL MEDICINE

## 2022-05-03 PROCEDURE — 94618 PULMONARY STRESS TESTING: ICD-10-PCS | Mod: 26,S$PBB,NTX, | Performed by: INTERNAL MEDICINE

## 2022-05-03 PROCEDURE — 99999 PR PBB SHADOW E&M-EST. PATIENT-LVL III: ICD-10-PCS | Mod: PBBFAC,TXP,, | Performed by: INTERNAL MEDICINE

## 2022-05-03 PROCEDURE — 99999 PR PBB SHADOW E&M-EST. PATIENT-LVL III: CPT | Mod: PBBFAC,TXP,, | Performed by: INTERNAL MEDICINE

## 2022-05-03 PROCEDURE — 99214 OFFICE O/P EST MOD 30 MIN: CPT | Mod: S$PBB,NTX,, | Performed by: INTERNAL MEDICINE

## 2022-05-03 PROCEDURE — 94618 PULMONARY STRESS TESTING: CPT | Mod: 26,S$PBB,NTX, | Performed by: INTERNAL MEDICINE

## 2022-05-03 PROCEDURE — 94618 PULMONARY STRESS TESTING: CPT | Mod: PBBFAC,NTX | Performed by: INTERNAL MEDICINE

## 2022-05-03 NOTE — LETTER
May 3, 2022        Luis Ahuja  [address]  [facility] [address]  Phone: [phone]  Fax: [phone]             [facility] 1st Fl  [address]  [facility] [address]  Phone: [phone]   Patient: Yamel Shah   MR Number: [id number]   YOB: 1950   Date of Visit: 5/3/2022       Dear Dr. Luis Ahuja    Thank you for referring Yamel Shah to me for evaluation. Attached you will find relevant portions of my assessment and plan of care.    If you have questions, please do not hesitate to call me. I look forward to following Yamel Shah along with you.    Sincerely,    Roberta Leigh, DO    Enclosure    If you would like to receive this communication electronically, please contact [email or web address] or [phone] to request [employer] access.    [employer] is a tool which provides read-only access to select patient information with whom you have a relationship. Its easy to use and provides real time access to review your patients record including encounter summaries, notes, results, and demographic information.    If you feel you have received this communication in error or would no longer like to receive these types of communications, please e-mail [email or web address]

## 2022-05-03 NOTE — PROGRESS NOTES
ADVANCED LUNG DISEASE CLINIC FOLLOW-UP    Subjective:       Patient ID: Yamel Shah is a 71 y.o. female.    Chief Complaint: Shortness of Breath (With exertion)    Ms. Shah presents for a follow up appointment.  She states that since her last visit she has been doing well.  She did contract [employer] in January this year, but had mild symptoms.  She denies any changes in her dyspnea overall and feels that she is stable.  Has occasional cough.  Has GERD symptoms for which she is taking Aciphex.  Follows with [facility] for PAH.  Reports having good days and bad days, but overall she is stable and doing well.  Compliant with her medications.     Shortness of Breath  Pertinent negatives include no abdominal pain, chest pain, ear pain, fever, headaches, leg swelling, rash, rhinorrhea, sore throat, sputum production, vomiting or wheezing.     Review of Systems   Constitutional: Negative for fever, chills, weight loss, weight gain, activity change, appetite change, fatigue, night sweats and weakness.   HENT: Negative for nosebleeds, postnasal drip, rhinorrhea, sinus pressure, sore throat, trouble swallowing, voice change, congestion, ear pain and hearing loss.    Eyes: Negative for redness.   Respiratory: Positive for shortness of breath. Negative for snoring, cough, sputum production, choking, chest tightness, wheezing, orthopnea, dyspnea on extertion and Paroxysmal Nocturnal Dyspnea.    Cardiovascular: Negative for chest pain, palpitations and leg swelling.   Genitourinary: Negative for difficulty urinating.   Endocrine: Negative for polydipsia, cold intolerance, heat intolerance and polyuria.    Musculoskeletal: Negative for arthralgias, back pain, gait problem, joint swelling and myalgias.   Skin: Negative for rash.   Gastrointestinal: Positive for acid reflux. Negative for nausea, vomiting, abdominal pain and abdominal distention.   Neurological: Negative for dizziness, syncope, weakness, light-headedness  and headaches.   Hematological: Negative for adenopathy. Does not bruise/bleed easily.   Psychiatric/Behavioral: Negative for confusion. The patient is not nervous/anxious.        Objective:      Physical Exam   Constitutional: She is oriented to person, place, and time. She appears well-developed and well-nourished.   HENT:   Head: Normocephalic.   Cardiovascular: Normal rate and regular rhythm.   Pulmonary/Chest: No accessory muscle usage. No respiratory distress. She has no decreased breath sounds. She has no wheezes. She has rhonchi in the right lower field and the left lower field. She has no rales.   Musculoskeletal:         General: Edema (BLE from calves to feet) present.      Cervical back: Normal range of motion.   Neurological: She is alert and oriented to person, place, and time.   Skin:   Bilateral feet wrapped and with noted edema   Psychiatric: She has a normal mood and affect.        X-Ray Chest PA And Lateral    Narrative  EXAMINATION:  XR CHEST PA AND LATERAL    CLINICAL HISTORY:  Cough    TECHNIQUE:  PA and lateral views of the chest were performed.    COMPARISON:  Chest x-ray 12/08/2019    FINDINGS:  Mediastinal structures are midline. Visualized air in the thoracic esophagus.  Cardiac silhouette is are normal.  Prominent pulmonary vasculature.    Stable reticular opacities in both lungs.  No acute consolidation, pleural effusion or pneumothorax.    Osseous structures are normal.    Impression  Stable chronic interstitial lung disease.  Visualized air within the thoracic esophagus could be related to underlying dysmotility.    Electronically signed by resident: Clare Morris  Date:    08/27/2021  Time:    09:57    Electronically signed by: Lilia Cueto  Date:    08/27/2021  Time:    11:14    Results for orders placed during the hospital encounter of 12/29/21    Echo    Interpretation Summary  · The left ventricle is normal in size with normal systolic function.  · The estimated ejection  fraction is 63%.  · Normal left ventricular diastolic function.  · Normal right ventricular size with normal right ventricular systolic function.  · Mild pulmonic regurgitation.  · Normal central venous pressure (3 mmHg).  · The estimated PA systolic pressure is 32 mmHg.  · Trivial posterior pericardial effusion.    6MW 5/3/2022 7/6/2021 12/15/2020 10/23/2019 9/9/2019 9/4/2015 1/19/2015   6MWT Status completed without stopping completed without stopping completed without stopping completed without stopping completed without stopping not completed completed without stopping   Patient Reported Dyspnea;Leg pain Dyspnea Dyspnea Dyspnea;Leg pain Dyspnea - No complaints   Was O2 used? No No No No No No No   6MW Distance walked (feet) 1400 1400 1380 1362 1100 - 1700   Distance walked (meters) 426.72 426.72 420.62 415.14 335.28 - 518.16   Did patient stop? No No No No No - No   Oxygen Saturation 99 98 95 99 98 99 98   Supplemental Oxygen Room Air Room Air Room Air Room Air Room Air Room Air Room Air   Heart Rate 71 66 65 76 63 67 65   Blood Pressure 115/54 119/58 130/57 133/80 146/67 124/65 135/62   Ju Dyspnea Rating  moderate moderate moderate light light moderate very, very light (just noticeable)   Oxygen Saturation 96 97 - 99 98 - 100   Supplemental Oxygen Room Air Room Air Room Air Room Air Room Air - Room Air   Heart Rate 88 97 94 92 80 - 49   Blood Pressure 138/63 135/63 139/64 136/60 129/58 - 143/60   Unable to obtain - - Oxygen Saturation - - - -   Ju Dyspnea Rating  somewhat heavy somewhat heavy somewhat heavy moderate moderate - light   Recovery Time (seconds) 130 145 124 78 180 - 72   Oxygen Saturation 98 99 99 99 99 - 100   Supplemental Oxygen Room Air Room Air Room Air Room Air Room Air - Room Air   Heart Rate 83 72 71 71 67 - 71       Assessment:       1. ILD (interstitial lung disease)        Outpatient Encounter Medications as of 5/3/2022   Medication Sig Dispense Refill    albuterol (VENTOLIN HFA) 90  mcg/actuation inhaler Inhale 2 puffs into the lungs every 4 (four) hours as needed for Wheezing. Rescue 18 g 1    carboxymethylcellulose sodium (REFRESH TEARS) 0.5 % Drop Apply 1-2 drops to every as needed      ergocalciferol (ERGOCALCIFEROL) 50,000 unit Cap TAKE 1 CAPSULE BY MOUTH EVERY 7 DAYS 12 capsule 1    ferrous sulfate 325 (65 FE) MG EC tablet Take 325 mg by mouth 2 (two) times daily.      loratadine (CLARITIN) 10 mg tablet Take 1 tablet (10 mg total) by mouth once daily. 30 tablet 11    multivitamin capsule Take 1 capsule by mouth once daily.      mycophenolate mofetil (CELLCEPT) 200 mg/mL SusR Take 5 mLs (1,000 mg total) by mouth 2 (two) times daily. 480 mL 1    nabumetone (RELAFEN) 750 MG tablet Take 1 tablet (750 mg total) by mouth 2 (two) times daily as needed for Pain. 60 tablet 3    NIFEdipine (ADALAT CC) 90 MG TbSR TAKE 1 TABLET(90 MG) BY MOUTH EVERY DAY 90 tablet 3    NIFEdipine (PROCARDIA-XL) 30 MG (OSM) 24 hr tablet Take 1 tablet (30 mg total) by mouth once daily. 30 tablet 11    pravastatin (PRAVACHOL) 20 MG tablet TAKE 1 TABLET(20 MG) BY MOUTH EVERY EVENING 90 tablet 1    pregabalin (LYRICA) 300 MG Cap Take 1 capsule (300 mg total) by mouth 2 (two) times daily. 60 capsule 6    PREVIDENT 5000 SENSITIVE 1.1-5 % Pste BRUSH FOR 2 MINUTES TWICE PER DAY      RABEprazole (ACIPHEX) 20 mg tablet Take 1 tablet (20 mg total) by mouth 2 (two) times daily. 60 tablet 11    sodium chloride 0.9% 0.9 % irrigation Irrigate with 2 mLs as directed daily as needed (wound cleaning). 3000 mL 0    tadalafil (ADCIRCA) 20 mg Tab Take 2 tablets (40 mg total) by mouth once daily. 28 tablet 11    tiZANidine (ZANAFLEX) 4 MG tablet Take 1 tablet (4 mg total) by mouth nightly as needed (muscle pain). 30 tablet 3    cadexomer iodine (IODOSORB) 0.9 % gel Apply topically daily as needed for Wound Care. (Patient not taking: No sig reported) 10 g 3    PREVIDENT 5000 BOOSTER PLUS 1.1 % Pste SMARTSIG:To Teeth       sars-cov-2, covid-19, (MODERNA COVID-19) 50 mcg/0.25 ml injection (BOOSTER) Inject into the muscle. (Patient not taking: Reported on 5/3/2022) 0.25 mL 0     Facility-Administered Encounter Medications as of 5/3/2022   Medication Dose Route Frequency Provider Last Rate Last Admin    acetaminophen tablet 650 mg  650 mg Oral Once PRN Darrius Rose MD        albuterol inhaler 2 puff  2 puff Inhalation Q20 Min PRN Darrius Rose MD        diphenhydrAMINE injection 25 mg  25 mg Intravenous Once PRN Darrius Rose MD        EPINEPHrine (EPIPEN) 0.3 mg/0.3 mL pen injection 0.3 mg  0.3 mg Intramuscular PRN Darrius Rose MD        methylPREDNISolone sodium succinate injection 40 mg  40 mg Intravenous Once PRN Darrius Rose MD        ondansetron disintegrating tablet 4 mg  4 mg Oral Once PRN Darrius Rose MD        sodium chloride 0.9% 500 mL flush bag   Intravenous PRN Darrius Rose MD        sodium chloride 0.9% flush 10 mL  10 mL Intravenous PRN Darrius Rose MD         No orders of the defined types were placed in this encounter.      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  FVC and DLCO from January 2022 remain stable.  Continue with MMF.  6MWT stable.  Has GERD which is currently stable.  Given GI and esophageal issues and stability in her PFTs, will continue to hold off on initiating OFEV at this time.      2. Continue Adcirca and follow up with cardiology for PAH.    3. RTC in 6 months.    Lexa Bush NP   Lung Transplant    Attending Note:     I have seen and evaluated the patient with Lexa Bush NP. Their note reflects the content of our discussion and my plan of care.  Pt followed for SSc-ILD with associated PAH.  FVC remains stable.  No desaturation on 6MWT.  On adcirca for PAH and followed by cardiology.  On MMF for SSc.  Has severe  esophageal disease and is on appropriate PPI therapy; knows dietary and lifestyle modifications.  Continue with current therapy.  Given her spirometric stability and severe GI disease, will avoid antifibrotics until she has clinical decline.  Pt aware and in agreement to the plan.         Roberta Leigh D.O.  Pulmonary/Critical Care and Lung Transplantation  [facility] [place]

## 2022-05-03 NOTE — PROCEDURES
Yamel Shah is a 71 y.o.  female patient, who presents for a 6 minute walk test ordered by DO Lu.  The diagnosis is  (ILD).  The patient's BMI is 25.8 kg/m2.  Predicted distance (lower limit of normal) is 308.91 meters.      Test Results:    The test was completed without stopping.   The total time walked was 360 seconds.  During walking, the patient reported:  Dyspnea, Leg pain. The patient used no assistive devices  during testing.     05/03/2022---------Distance: 426.72 meters (1400 feet)     O2 Sat % Supplemental Oxygen Heart Rate Blood Pressure Ju Scale   Pre-exercise  (Resting) 99 % Room Air 71 bpm 115/54 mmHg 3   During Exercise 96 % Room Air 88 bpm 138/63 mmHg 4   Post-exercise  (Recovery) 98 % Room Air  83 bpm   mmHg       Recovery Time: 130 seconds    Performing nurse/tech: HELEN Hernandez      PREVIOUS STUDY:   The patient had a previous study.  07/06/2021---------Distance: 426.72 meters (1400 feet)       O2 Sat % Supplemental Oxygen Heart Rate Blood Pressure Ju Scale   Pre-exercise  (Resting) 98 % Room Air 66 bpm 119/58 mmHg 3   During Exercise 97 % Room Air 97 bpm 135/63 mmHg 4   Post-exercise  (Recovery) 99 % Room Air  72 bpm   mmHg           CLINICAL INTERPRETATION:  Six minute walk distance is 426.72 meters (1400 feet) with somewhat heavy dyspnea.  During exercise, there was significant desaturation while breathing room air.  Both blood pressure and heart rate remained stable with walking.  The patient reported non-pulmonary symptoms during exercise.  Since the previous study in July 2021, exercise capacity is unchanged.  Based upon age and body mass index, exercise capacity is normal.

## 2022-05-09 ENCOUNTER — PATIENT MESSAGE (OUTPATIENT)
Dept: ENDOSCOPY | Facility: HOSPITAL | Age: 72
End: 2022-05-09
Payer: MEDICARE

## 2022-05-09 ENCOUNTER — TELEPHONE (OUTPATIENT)
Dept: ENDOSCOPY | Facility: HOSPITAL | Age: 72
End: 2022-05-09
Payer: MEDICARE

## 2022-05-25 DIAGNOSIS — E55.9 VITAMIN D DEFICIENCY: ICD-10-CM

## 2022-05-25 RX ORDER — ERGOCALCIFEROL 1.25 MG/1
50000 CAPSULE ORAL
Qty: 12 CAPSULE | Refills: 1 | Status: SHIPPED | OUTPATIENT
Start: 2022-05-25 | End: 2022-09-07 | Stop reason: SDUPTHER

## 2022-05-26 ENCOUNTER — OFFICE VISIT (OUTPATIENT)
Dept: PAIN MEDICINE | Facility: CLINIC | Age: 72
End: 2022-05-26
Payer: MEDICARE

## 2022-05-26 DIAGNOSIS — M50.30 DDD (DEGENERATIVE DISC DISEASE), CERVICAL: ICD-10-CM

## 2022-05-26 DIAGNOSIS — M79.18 MYOFASCIAL PAIN: ICD-10-CM

## 2022-05-26 DIAGNOSIS — G60.9 IDIOPATHIC NEUROPATHY: ICD-10-CM

## 2022-05-26 DIAGNOSIS — M34.89 CUTANEOUS SCLERODERMA: ICD-10-CM

## 2022-05-26 DIAGNOSIS — G89.4 CHRONIC PAIN DISORDER: Primary | ICD-10-CM

## 2022-05-26 DIAGNOSIS — M47.812 CERVICAL SPONDYLOSIS: ICD-10-CM

## 2022-05-26 PROCEDURE — 99213 OFFICE O/P EST LOW 20 MIN: CPT | Mod: 95,,, | Performed by: NURSE PRACTITIONER

## 2022-05-26 PROCEDURE — 99213 PR OFFICE/OUTPT VISIT, EST, LEVL III, 20-29 MIN: ICD-10-PCS | Mod: 95,,, | Performed by: NURSE PRACTITIONER

## 2022-05-26 RX ORDER — NABUMETONE 750 MG/1
750 TABLET, FILM COATED ORAL 2 TIMES DAILY PRN
Qty: 60 TABLET | Refills: 3 | Status: SHIPPED | OUTPATIENT
Start: 2022-05-26 | End: 2022-08-26 | Stop reason: SDUPTHER

## 2022-05-26 NOTE — PROGRESS NOTES
Chronic Pain - Follow Up  Chronic Pain-Tele-Medicine-Established Note (Follow up visit)        The patient location is: Home  The chief complaint leading to consultation is: pain  Visit type: Virtual visit with synchronous audio and video  Total time spent with patient: 25 min  Each patient to whom he or she provides medical services by telemedicine is:  (1) informed of the relationship between the physician and patient and the respective role of any other health care provider with respect to management of the patient; and (2) notified that he or she may decline to receive medical services by telemedicine and may withdraw from such care at any time.      Referring Physician: No ref. provider found    Chief Complaint:   Chief Complaint   Patient presents with    Neck Pain        SUBJECTIVE: Disclaimer: This note has been generated using voice-recognition software. There may be typographical errors that have been missed during proof-reading    Interval History 5/26/2022:  The patient returns to clinic today for follow up of neck and foot pain via virtual visit. She continues to report bilateral foot pain. She continues to report ulcers to her feet. She continues to report neck pain with intermittent radiating pain into her arms. She continues to report benefit with current medications. She is taking Lyrica, Zanaflex, and Relafen. She also takes Tylenol #3 with benefit. She denies any adverse effects with these medications. She continues to perform a home exercise routine. She denies any other health changes.     Interval History 2/15/22  Patient presents in follow up. Was previously Dr. King patient with primary complaints of neck and foot pain. She reports her pain has been stable since her last visit. She did have covid in January and stopped all of ehr pain medications. She got an antibody infusion. She has fully recovered. She restarted her pain medications and reports that they are effective. She is taking  Tylenol 3 daily, nambumetome 750 mg BID, lyrica 150 mg BID and Zanaflex 4 mg TID. She did do PT for her neck November to January per her report. She continues with mild neck pain without radiation into the arms or hands. Pain is worse with sidebending and rotation to the right and better with rest and medications. She denies any numbness tingling weakness or b/b incontinence.    Interval History 1/4/2022:  The patient returns to clinic today for follow up of foot pain via virtual visit. She continues to report foot pain and wounds. She continues to follow up with podiatry and wound care. She reports increased neck pain. This pain is tight and aching in nature. She denies any radicular arm pain. She is currently taking Relafen, Lyrica, and Tylenol #3 with benefit and without adverse effects. She reports limited benefit with Zanaflex. She denies any other health changes.     Interval History 12/8/2021:  The patient returns to clinic today for foot pain via virtual visit. She continues to report foot pain. She does have foot wounds. She recently saw Dr. Claudio in [facility]. He does not recommend any vascular interventions at this time. She continues to follow up with Podiatry and wound care. She continues to take Zanaflex, Relafen, Lyrica and Tylenol #3 with benefit. She denies any adverse effects. She denies any other health changes. Her pain today is 7/10.    Interval History 11/8/2021:  The patient returns to clinic today for follow up of foot pain via virtual visit. At last visit, she was starting on Tylenol #3. She does find benefit with this. She sometimes breaks this in half. She continues to take Zanaflex, Relafen, and Lyrica. She continues to report bilateral foot pain and ulcers. She continues to follow up with podiatry. Her pain is worse at night. She denies any other health changes. Her pain today is 7/10.    Interval History 10/20/2021:  The patient returns to clinic today for follow up of foot pain. At last  visit, she was started on Tramadol. She did have relief but had significant side effects. She experienced sedation, itching, headaches, and decreased appetite. This has resolved after stopping the medication. She continues to report foot pain. This pain is worse at night. She continues to follow up with podiatry. She continues to take Tizanidine, Relafen, and Lyrica with some benefit. She denies any adverse effects. She denies any other health changes. Her pain today is 8/10.    Interval history 10/06/2021:  Since previous encounter the patient continues to have nonhealing wound has been followed up with wound care and is scheduled to follow with Rheumatology for the wounds in her legs she was referred to podiatry with stated that they have done nothing different me that she with doing on her own.  She continues to have neck pain and bilateral foot pain.  She has been taking tizanidine Relafen Tylenol p.m. and Lyrica 600 mg per day.  She states that all these medications are helpful.    Interval History 6/3/2021:  The patient returns to clinic today for follow up of foot pain. She continues to report left foot pain secondary to ulcer. She is seeing Wound Care. She describes this pain as burning in nature. Her pain is worse with prolonged activity. She reports intermittent neck pain. She continues to take Zanaflex. She reports limited benefit with increased Lyrica dose. She denies any other health changes. Her pain today is 5/10.    Interval History 5/5/2021:  The patient returns to clinic today for follow up of foot pain. She reports a new ulcer on her left foot. She is seeing Wound Care. She reports increased pain with this new ulcer. She describes this pain as burning. She continues to report neck pain that is tight and aching. She denies any radicular arm pain. Her pain is worse with prolonged activity, especially turning her head to the side. She continues to perform a home exercise routine. She continues to  take Lyrica and Zanaflex with benefit. She denies any other health changes. Her pain today is 5/10.    Interval History 2/8/2021:  The patient returns to clinic today for follow up of neck and foot pain. She reports that her neck pain has significantly improved since last visit. She has recently completed physical therapy for this. She is performing a home stretching routine. She reports intermittent neck pain that is tight and aching in nature. She denies any radicular arm pain. She continues to report bilateral foot pain. This is worse at night. She continues to take Lyrica and Zanaflex with benefit. She denies any other health changes. Her pain today is 4/10.    Interval History 1/8/2021:  The patient returns to clinic today for follow up of neck and foot pain. She continues to report neck pain that is tight and aching in nature. She denies any radicular pain. She continues to participate in physical therapy and a home exercise routine. She continues to report bilateral foot pain, worse at night. She reports that increased Lyrica dose has provided improved benefit. She also takes Zanaflex with benefit. She denies any other health changes. Her pain today is 3/10.    Interval History 11/13/2020:  The patient returns to clinic today for follow up of neck and foot pain. She continues to report bilateral foot pain. Since last visit, she had a venous ablation procedure on her right leg through Vascular. She is scheduled for the left side in the next month. She continues to report bilateral foot pain, worse at night. She continues to report neck pain that is tight and aching in nature. She denies any radicular pain. Her pain is worse flexion and turning her head to the side. She is currently participating in physical therapy with some benefit. She continues to take Lyrica but does ask if this could be increased. She continues to take Zanaflex with benefit. She denies any other health changes. Her pain today is  5/10    Interval History 10/2/2020:  The patient returns to clinic today for follow up of foot pain. She reports increased bilateral foot pain over the last few months. This pain is burning in nature. This pain is worse at night. She continues to have ulcers to her feet related to her scleroderma. She would like to restart the Lyrica. She also reports increased neck pain that is sore and aching in nature. She denies any radiating arm pain. This is worse with turning her head and at night. She denies any other health changes. Her pain today is 7/10.    Interval History 6/5/2020:  The patient requests audio visit today for follow up of bilateral foot pain. She reports intermittent foot pain that is tolerable at this time. She has discontinued Lyrica as of April. She continues to have ulcers to her feet. She does have wound care. She denies any other health changes.     Interval History 1/17/2020:  The patient returns to clinic today for follow up. She continues to report bilateral foot pain. She describes this pain as burning and tingling in nature. At last visit, we decreased her Lyrica dose to 100 mg at night. She reports increased pain since then. She would like to go to back to the 150 mg dose. She continues to have ulcers to her feet, left worse than right. She continues to participate in a home wound care program. She denies any other health changes. Her pain today is 6/10.    Interval History 12/17/2019:  The patient returns to clinic today for follow up. She reports improving foot pain. She reports improved healing ulcers to her left foot. She continues to report right foot pain that is burning and tingling in nature. She continues to participate in a home wound care routine. She continues to take Lyrica. She asks about decreasing this. She denies any other health changes. Her pain today is 5/10.    Interval History 9/17/2019:  The patient returns to clinic today for follow up. She continues to report  bilateral foot pain that is burning and tingling in nature. Her pain is worse with sitting and at night. She continues to have slow healing ulcers to both feet. She continues to perform a home wound care program. She is no longer taking Gabapentin which was previously called in. She is now taking Pregabalin generic with benefit. She denies any other health changes. Her pain today is 4/10.    Interval History 6/13/2019:  The patient returns to clinic for follow up for bilateral foot pain. She continues to report bilateral foot pain that is burning in nature. She continues to have slow healing wounds to both feet from ulcers. She continues to take Lyrica once daily but reports increased pain. She continues to take Vitamin B12. She denies any other health changes. Her pain today is 8/10.    Interval History 3/13/2019:  The patient returns to clinic today for follow up. She continues to report bilateral foot pain that is burning and tingling in nature. Her pain is worse with prolonged walking and standing. She continues to have bilateral foot wounds. She reports that these wounds have significantly improved since last visit. She is currently taking Vitamin B12 with benefit. She continues to report benefit with Lyrica. She is currently taking this once daily. She denies any other health changes. Her pain today is 5/10.    Interval History 2/6/2019:  The patient returns to clinic today for follow up. She reports that her bilateral foot wounds have significantly improved since last visit. She does report some significant improvement in her foot pain. She reports intermittent bilateral foot pain especially with prolonged walking. She describes this pain as heavy and tingling in nature. She is no longer taking Celebrex. She is currently taking Lyrica 150 mg BID with benefit. She does report that the Lyrica is expensive for her. She denies any other health changes. Her pain today is 5/10.    Interval History 12/5/18:  Patient  returns to clinic today for follow up. She reports that since increasing her medications, she is having significant improvement in pain during the day time. She is currently taking Lyrica 75mg TID and Celebrex 200mg TID. However, she states that the burning  And tingling pain in both her feet increase in the evening around 6 or 7pm. She is unable to sleep during the nights due to the pain. She is still wearing her bilateral boots due to non healing ulcers from her scleroderma.     Interval History 8/30/2018:  The patient returns to clinic today for follow up. She continues to report bilateral foot pain that is burning and tingling in nature. She reports that this pain is worse at night. She is currently taking Lyrica 75 mg BID with limited relief. She did not have any benefit with Mobic. She continues to take Aleve with some benefit. She continues to have nonhealing ulcers. She wears an ACE bandage to her right calf, as well as boots to her bilateral feet. She denies any other health changes. Her pain today is 7/10.     Interval History 7/11/2018:  Since previous encounter she has weaned from gabapentin to 600mg / day and did not notice significant worsening of pain, but was having somnelence from higher doses of the medication in the past.  We are weaning in an attempt to trial Lyrica as an alternative to gabapentin.  Tramadol causes significant sedation, limiting its use.  She has discontinued the use of the tramadol.  Additionally she does have benefit from anti-inflammatory medication, has been using aleve, has not trialed CANTRELL-2 inhibitors in the past.    Interval history 05/16/2018:      The patient has had x-rays of the lumbar spine which did not reveal any evidence of significant neuroforaminal stenosis with degenerative disc disease.  There is mild facet arthropathy.  She has escalated her gabapentin and is currently taking 2100 mg of gabapentin per day without any noticeable improvement but daytime  somnolence.  She continues to have nonhealing ulcers and topical pain cream is helping to a limited degree over her leg in areas where there is no skin disruption.    Initial encounter:    Yamel Shah presents to the clinic for the evaluation of foot pain. The pain started 2 years ago following ulcers and symptoms have been worsening.    Brief history: History of scleroderma    Pain Description:    The pain is located in the both feet in the area of slowly healing chronic foot ulcers which were partly from venous insufficiency and stasis associated with her scleroderma.  In her right lower extremity she describes radicular symptoms in the L4/5 distribution.    At BEST  5/10     At WORST  10/10 on the WORST day.      On average pain is rated as 8/10.     Today the pain is rated as 8/10    The pain is described as burning, sharp, shooting and constant      Symptoms interfere with daily activity, sleeping and work.     Exacerbating factors: Laying, Walking, Night Time, Morning and Getting out of bed/chair.      Mitigating factors medications.     Patient denies night fever/night sweats, urinary incontinence, bowel incontinence, significant weight loss, significant motor weakness and loss of sensations.  Patient denies any suicidal or homicidal ideations    Pain Medications:  Current:  Lyrica 300 mg daily  Zanaflex    Tried in Past:  NSAIDs -Aleve, Mobic, Celebrex  TCA -Never  SNRI -Never  Anti-convulsants - Gabapentin, Lyrica  Muscle Relaxants -Never  Opioids-Tramadol    Physical Therapy/Home Exercise: no       report:  Reviewed and consistent with medication use as prescribed.    Pain Procedures: none    Chiropractor -never  Acupuncture - never  TENS unit -never  Spinal decompression -never  Joint replacement -never    Imaging:   Xray Cervical Spine 12/6/2019:  FINDINGS:  Odontoid prevertebral soft tissues and posterior elements are intact.  Neural foramina are patent.  No fracture dislocation bone  destruction seen.  There is mild DJD.     Impression:     Mild DJD.    X-ray lumbar spine 6/1/2016:  2 views: Alignment is normal. There is mild DJD. No fracture dislocation bone destruction seen.   Impression      Mild DJD.     Xray lumbar spine 4/2018:  COMPARISON:  June 2016    FINDINGS:  There is slight curvature of the lumbar spine.  The vertebral bodies are normally aligned and normal in height.  Mild disc space narrowing present at L5-S1.  There is mild facet degenerative change in the lower spine.  No significant osteophytic spurring present.  There is no change in alignment with flexion or extension.      Past Medical History:   Diagnosis Date    Abnormal Pap smear     Acid reflux     Allergy     Arthritis     Encounter for blood transfusion     GERD (gastroesophageal reflux disease)     History of migraine headaches     Hx of colonic polyp     Hypertension     Idiopathic neuropathy 7/20/2012    ILD (interstitial lung disease) 11/6/2013    Iron deficiency anemia 3/18/2014    MRSA carrier     Osteopenia     Pneumonia     Pulmonary fibrosis     Pulmonary hypertension     Raynaud's disease     Scleroderma, diffuse     Sjogren's syndrome     Vitamin D deficiency 11/14/2013     Past Surgical History:   Procedure Laterality Date    24 HOUR IMPEDANCE PH MONITORING OF ESOPHAGUS IN PATIENT NOT TAKING ACID REDUCING MEDICATIONS N/A 3/4/2020    Procedure: IMPEDANCE PH STUDY, ESOPHAGEAL, 24 HOUR, IN PATIENT NOT TAKING ACID REDUCING MEDICATION;  Surgeon: Annamaria Mendoza MD;  [facility]  Service: Endoscopy;  Laterality: N/A;  OFF PPI/H2 Blocker   Motility Studies   Hold Narcotics x 1 days   Hold TCA x 1 days  2/26 - LVM attempting to confirm appt  2/27 - Confirmed appt    ANORECTAL MANOMETRY N/A 5/10/2021    Procedure: MANOMETRY, ANORECTAL with balloon expulsion test;  Surgeon: Annamaria Mendoza MD;  [facility]  Service: Endoscopy;  Laterality: N/A;  order  combined  covid test 5/7 [facility], [facility]    BREAST BIOPSY      Left, benign    CERVICAL CONIZATION   W/ LASER  1970    COLONOSCOPY      COLONOSCOPY N/A 3/29/2019    Procedure: COLONOSCOPY;  Surgeon: Annamaria Mendoza MD;  Location: [facility] (2ND FLR);  Service: Endoscopy;  Laterality: N/A;    COLONOSCOPY N/A 5/10/2021    Procedure: COLONOSCOPY;  Surgeon: Annamaria Mendoza MD;  Location: [facility] (4TH FLR);  Service: Endoscopy;  Laterality: N/A;  2nd floor-previous scopes done on 2nd floor, gastroparesis  full liquid diet x2 days, clear liquid x1 day prior to procedure  covid test 5/7 [facility]  [facility]    DILATION AND CURETTAGE OF UTERUS      ESOPHAGEAL MANOMETRY WITH MEASUREMENT OF IMPEDANCE N/A 3/4/2020    Procedure: MANOMETRY, ESOPHAGUS, WITH IMPEDANCE MEASUREMENT;  Surgeon: Annamaria Mendoza MD;  Location: [facility] (4TH FLR);  Service: Endoscopy;  Laterality: N/A;  OFF PPI/H2 Blocker   Motility Studies   Hold Narcotics x 1 days   Hold TCA x 1 days    ESOPHAGOGASTRODUODENOSCOPY      ESOPHAGOGASTRODUODENOSCOPY N/A 3/29/2019    Procedure: EGD (ESOPHAGOGASTRODUODENOSCOPY);  Surgeon: Annamaria Mendoza MD;  Location: [facility] (2ND FLR);  Service: Endoscopy;  Laterality: N/A;  pulmonary htn    ESOPHAGOGASTRODUODENOSCOPY N/A 2/2/2021    Procedure: ESOPHAGOGASTRODUODENOSCOPY (EGD);  Surgeon: Annamaria Mendoza MD;  Location: [facility] (2ND FLR);  Service: Endoscopy;  Laterality: N/A;  2nd floor gastroparesis  3 days full liquid diet and 1 day clears  covid test 1/30 [facility]    HYSTERECTOMY  1990    FRANDY (AUB, Fibroids), ovaries remain    RIGHT HEART CATHETERIZATION Right 1/5/2021    Procedure: INSERTION, CATHETER, RIGHT HEART;  Surgeon: Aureliano Sy MD;  Location: [facility] CATH LAB;  Service: Cardiology;  Laterality: Right;    VARICOSE VEIN SURGERY       Social History     Socioeconomic History     Marital status:      Spouse name: Lewis    Number of children: 4   Occupational History    Occupation: -retired     Employer: [employer]     Comment[place]     Employer: RETIRED   Tobacco Use    Smoking status: Never Smoker    Smokeless tobacco: Never Used   Substance and Sexual Activity    Alcohol use: Yes     Alcohol/week: 0.0 standard drinks     Comment: occasionally    Drug use: No    Sexual activity: Yes     Partners: Male     Birth control/protection: None   Other Topics Concern    Are you pregnant or think you may be? No    Breast-feeding No   Social History Narrative         Social Determinants of Health     Financial Resource Strain: Low Risk     Difficulty of Paying Living Expenses: Not very hard   Food Insecurity: No Food Insecurity    Worried About Running Out of Food in the Last Year: Never true    Ran Out of Food in the Last Year: Never true   Transportation Needs: No Transportation Needs    Lack of Transportation (Medical): No    Lack of Transportation (Non-Medical): No   Physical Activity: Insufficiently Active    Days of Exercise per Week: 2 days    Minutes of Exercise per Session: 10 min   Stress: No Stress Concern Present    Feeling of Stress : Only a little   Social Connections: Moderately Isolated    Frequency of Communication with Friends and Family: More than three times a week    Frequency of Social Gatherings with Friends and Family: Once a week    Attends Taoism Services: Never    Active Member of Clubs or Organizations: No    Attends Club or Organization Meetings: Never    Marital Status:    Housing Stability: Low Risk     Unable to Pay for Housing in the Last Year: No    Number of Places Lived in the Last Year: 1    Unstable Housing in the Last Year: No     Family History   Problem Relation Age of Onset    Breast cancer Mother     Hypertension Father     No Known Problems Daughter     No Known Problems Son      No Known Problems Daughter     No Known Problems Son     Breast cancer Sister     Diabetes Sister     Breast cancer Maternal Aunt     Osteoarthritis Brother     Diabetes Brother     Melanoma Neg Hx     Colon cancer Neg Hx     Crohn's disease Neg Hx     Stomach cancer Neg Hx     Ulcerative colitis Neg Hx     Rectal cancer Neg Hx     Irritable bowel syndrome Neg Hx     Esophageal cancer Neg Hx     Celiac disease Neg Hx     Ovarian cancer Neg Hx     Liver cancer Neg Hx     Pancreatic cancer Neg Hx        Review of patient's allergies indicates:   Allergen Reactions    Sulfa (sulfonamide antibiotics)      Other reaction(s): Rash       Current Outpatient Medications   Medication Sig    albuterol (VENTOLIN HFA) 90 mcg/actuation inhaler Inhale 2 puffs into the lungs every 4 (four) hours as needed for Wheezing. Rescue    cadexomer iodine (IODOSORB) 0.9 % gel Apply topically daily as needed for Wound Care. (Patient not taking: No sig reported)    carboxymethylcellulose sodium (REFRESH TEARS) 0.5 % Drop Apply 1-2 drops to every as needed    ergocalciferol (ERGOCALCIFEROL) 50,000 unit Cap Take 1 capsule (50,000 Units total) by mouth every 7 days.    ferrous sulfate 325 (65 FE) MG EC tablet Take 325 mg by mouth 2 (two) times daily.    loratadine (CLARITIN) 10 mg tablet Take 1 tablet (10 mg total) by mouth once daily.    multivitamin capsule Take 1 capsule by mouth once daily.    mycophenolate mofetil (CELLCEPT) 200 mg/mL SusR Take 5 mLs (1,000 mg total) by mouth 2 (two) times daily.    nabumetone (RELAFEN) 750 MG tablet Take 1 tablet (750 mg total) by mouth 2 (two) times daily as needed for Pain.    NIFEdipine (ADALAT CC) 90 MG TbSR TAKE 1 TABLET(90 MG) BY MOUTH EVERY DAY    NIFEdipine (PROCARDIA-XL) 30 MG (OSM) 24 hr tablet Take 1 tablet (30 mg total) by mouth once daily.    pravastatin (PRAVACHOL) 20 MG tablet TAKE 1 TABLET(20 MG) BY MOUTH EVERY EVENING    pregabalin (LYRICA) 300 MG Cap  Take 1 capsule (300 mg total) by mouth 2 (two) times daily.    PREVIDENT 5000 BOOSTER PLUS 1.1 % Pste SMARTSIG:To Teeth    PREVIDENT 5000 SENSITIVE 1.1-5 % Pste BRUSH FOR 2 MINUTES TWICE PER DAY    RABEprazole (ACIPHEX) 20 mg tablet Take 1 tablet (20 mg total) by mouth 2 (two) times daily.    sars-cov-2, covid-19, (MODERNA COVID-19) 50 mcg/0.25 ml injection (BOOSTER) Inject into the muscle. (Patient not taking: Reported on 5/3/2022)    sodium chloride 0.9% 0.9 % irrigation Irrigate with 2 mLs as directed daily as needed (wound cleaning).    tadalafil (ADCIRCA) 20 mg Tab Take 2 tablets (40 mg total) by mouth once daily.    tiZANidine (ZANAFLEX) 4 MG tablet Take 1 tablet (4 mg total) by mouth nightly as needed (muscle pain).     Current Facility-Administered Medications   Medication    acetaminophen tablet 650 mg    albuterol inhaler 2 puff    diphenhydrAMINE injection 25 mg    EPINEPHrine (EPIPEN) 0.3 mg/0.3 mL pen injection 0.3 mg    methylPREDNISolone sodium succinate injection 40 mg    ondansetron disintegrating tablet 4 mg    sodium chloride 0.9% 500 mL flush bag    sodium chloride 0.9% flush 10 mL       REVIEW OF SYSTEMS:    GENERAL:  No weight loss, malaise or fevers.  HEENT:   No recent changes in vision or hearing  NECK:  Negative for lumps,  difficulty with swallowing associated with scleroderma.  RESPIRATORY:  Negative for cough, wheezing or shortness of breath, patient denies any recent URI. Albuterol (history of ILD)  CARDIOVASCULAR:  Negative for chest pain, leg swelling or palpitations.  GI:  Negative for abdominal discomfort, blood in stools or black stools or change in bowel habits. GERD controlled zantac  MUSCULOSKELETAL:  See HPI.  SKIN:   Chronic low healing venous stasis ulcerations to bilateral lower extremities   PSYCH:  No mood disorder or recent psychosocial stressors.  Patients sleep is not disturbed secondary to pain.  HEMATOLOGY/LYMPHOLOGY:   History of iron deficiency  anemia, takes daily iron supplementation.    ENDO: No history of diabetes or thyroid dysfunction  NEURO:   No history of headaches, syncope, paralysis, seizures or tremors.  All other reviewed and negative other than HPI.    OBJECTIVE:    Exam limited due to virtual visit:  GENERAL: Well appearing, in no acute distress, alert and oriented x3.  PSYCH:  Mood and affect appropriate.    Previous physical exam:  There were no vitals taken for this visit.    PHYSICAL EXAMINATION:    GENERAL: Well appearing, in no acute distress, alert and oriented x3.  PSYCH:  Mood and affect appropriate.  SKIN: Tight skin associated with scleroderma. Wearing shoes today.   HEAD/FACE:  Normocephalic, atraumatic. Cranial nerves grossly intact.  NECK: There is mild pain with palpation over trapezius muscle bilaterally. There is no pain with palpation over cervical facet joints. Limited ROM with mild pain on extension and lateral rotation.    CV: RRR with palpation of the radial artery.  PULM: No evidence of respiratory difficulty, symmetric chest rise.  EXTREMITIES: Contractures over on bilateral hands with ulcerations to knuckles, right greater than left.   MUSCULOSKELETAL:   Bilateral lower extremity strength testing limited secondary to pain in the feet.    NEURO: Bilateral lower extremity coordination and muscle stretch reflexes are physiologic and symmetric. Decreased sensation to BLE. Plantar response are downgoing. No clonus.  No loss of sensation is noted.  GAIT: Antalgic, ambulates without assistance       ASSESSMENT: 71 y.o. year old female with pain, consistent with     Encounter Diagnoses   Name Primary?    Chronic pain disorder Yes    Cutaneous scleroderma     Idiopathic neuropathy     Myofascial pain     Cervical spondylosis     DDD (degenerative disc disease), cervical        PLAN:     - Previous imaging was reviewed and discussed with the patient today.    - Consider cervical facet joint injections in the future.     -  Continue Lyrica 300 mg BID.     - Continue Relafen.     - Continue Zanaflex, refill provided.     - Continue Tylenol #3 once daily PRN.     - The patient is here today for a refill of current pain medications and they believe these provide effective pain control and improvements in quality of life.  The patient notes no serious side effects, and feels the benefits outweigh the risks.  The patient was reminded of the pain contract that they signed previously as well as the risks and benefits of the medication including possible death.  The updated [place] Board  Pharmacy prescription monitoring program was reviewed, and the patient has been found to be compliant with current treatment plan.    - Continue to follow up with wound care. Continue follow up with Vascular.     - Continue home exercise routine.     - RTC in 3 months or sooner if needed. She may call for refills.     - Dr. Hinson was consulted on the patient and agrees with this plan.    The above plan and management options were discussed at length with patient. Patient is in agreement with the above and verbalized understanding.     Viktoriya Camara NP  05/26/2022

## 2022-05-27 DIAGNOSIS — M34.89 CUTANEOUS SCLERODERMA: ICD-10-CM

## 2022-05-27 DIAGNOSIS — G89.4 CHRONIC PAIN DISORDER: Primary | ICD-10-CM

## 2022-05-27 DIAGNOSIS — G60.9 IDIOPATHIC NEUROPATHY: ICD-10-CM

## 2022-05-27 RX ORDER — ACETAMINOPHEN AND CODEINE PHOSPHATE 300; 30 MG/1; MG/1
1 TABLET ORAL DAILY PRN
Qty: 30 TABLET | Refills: 0 | Status: SHIPPED | OUTPATIENT
Start: 2022-05-27 | End: 2022-06-25 | Stop reason: SDUPTHER

## 2022-05-31 ENCOUNTER — HOSPITAL ENCOUNTER (OUTPATIENT)
Dept: RADIOLOGY | Facility: HOSPITAL | Age: 72
Discharge: HOME | End: 2022-05-31
Attending: INTERNAL MEDICINE
Payer: MEDICARE

## 2022-05-31 VITALS — BODY MASS INDEX: 25.99 KG/M2 | WEIGHT: 156 LBS | HEIGHT: 65 IN

## 2022-05-31 DIAGNOSIS — Z12.31 BREAST CANCER SCREENING BY MAMMOGRAM: ICD-10-CM

## 2022-05-31 PROCEDURE — 77067 SCR MAMMO BI INCL CAD: CPT | Mod: 26,NTX,, | Performed by: RADIOLOGY

## 2022-05-31 PROCEDURE — 77063 BREAST TOMOSYNTHESIS BI: CPT | Mod: 26,NTX,, | Performed by: RADIOLOGY

## 2022-05-31 PROCEDURE — 77063 BREAST TOMOSYNTHESIS BI: CPT | Mod: TC,NTX

## 2022-05-31 PROCEDURE — 77067 SCR MAMMO BI INCL CAD: CPT | Mod: TC,NTX

## 2022-05-31 PROCEDURE — 77063 MAMMO DIGITAL SCREENING BILAT WITH TOMO: ICD-10-PCS | Mod: 26,NTX,, | Performed by: RADIOLOGY

## 2022-05-31 PROCEDURE — 77067 MAMMO DIGITAL SCREENING BILAT WITH TOMO: ICD-10-PCS | Mod: 26,NTX,, | Performed by: RADIOLOGY

## 2022-06-03 ENCOUNTER — TELEPHONE (OUTPATIENT)
Dept: ENDOSCOPY | Facility: HOSPITAL | Age: 72
End: 2022-06-03
Payer: MEDICARE

## 2022-06-03 ENCOUNTER — PATIENT MESSAGE (OUTPATIENT)
Dept: ENDOSCOPY | Facility: HOSPITAL | Age: 72
End: 2022-06-03
Payer: MEDICARE

## 2022-06-03 NOTE — TELEPHONE ENCOUNTER
Contacted patient to schedule procedure. As per our conversation,  patient is scheduled for EGD on 7/1/22 at 10:45am. Instructions reviewed with patient and informed instructions will be on patient portal for review.

## 2022-06-08 ENCOUNTER — SPECIALTY PHARMACY (OUTPATIENT)
Dept: PHARMACY | Facility: CLINIC | Age: 72
End: 2022-06-08
Payer: MEDICARE

## 2022-06-08 NOTE — TELEPHONE ENCOUNTER
Spoke w pt regarding Cellcept refill. Pt stated she is going out of town tomorrow and would like to pickup today (6/8). Pt stated she missed a few doses and waiting on approval from [facility] to set up refill.

## 2022-06-08 NOTE — TELEPHONE ENCOUNTER
Specialty Pharmacy - Refill Coordination    Specialty Medication Orders Linked to Encounter    Flowsheet Row Most Recent Value   Medication #1 mycophenolate mofetil (CELLCEPT) 200 mg/mL SusR (Order#[id number], Rx#[id number])          Refill Questions - Documented Responses    Flowsheet Row Most Recent Value   Patient Availability and HIPAA Verification    Does patient want to proceed with activity? Yes   HIPAA/medical authority confirmed? Yes   Relationship to patient of person spoken to? Self   Refill Screening Questions    Would patient like to speak to a pharmacist? No   When does the patient need to receive the medication? 06/08/22   Refill Delivery Questions    How will the patient receive the medication? Pickup   When does the patient need to receive the medication? 06/08/22   Address in [facility] confirmed and updated if neccessary? Yes   Expected Copay ($) 15   Is the patient able to afford the medication copay? Yes   Payment Method CC on file   Days supply of Refill 32   Supplies needed? No supplies needed   Refill activity completed? Yes   Refill activity plan Refill scheduled   Shipment/Pickup Date: 06/08/22          Current Outpatient Medications   Medication Sig    acetaminophen-codeine 300-30mg (TYLENOL #3) 300-30 mg Tab Take 1 tablet by mouth daily as needed (pain).    albuterol (VENTOLIN HFA) 90 mcg/actuation inhaler Inhale 2 puffs into the lungs every 4 (four) hours as needed for Wheezing. Rescue    cadexomer iodine (IODOSORB) 0.9 % gel Apply topically daily as needed for Wound Care. (Patient not taking: No sig reported)    carboxymethylcellulose sodium (REFRESH TEARS) 0.5 % Drop Apply 1-2 drops to every as needed    ergocalciferol (ERGOCALCIFEROL) 50,000 unit Cap Take 1 capsule (50,000 Units total) by mouth every 7 days.    ferrous sulfate 325 (65 FE) MG EC tablet Take 325 mg by mouth 2 (two) times daily.    loratadine (CLARITIN) 10 mg tablet Take 1 tablet (10 mg total) by mouth once  daily.    multivitamin capsule Take 1 capsule by mouth once daily.    mycophenolate mofetil (CELLCEPT) 200 mg/mL SusR Take 5 mLs (1,000 mg total) by mouth 2 (two) times daily.    nabumetone (RELAFEN) 750 MG tablet Take 1 tablet (750 mg total) by mouth 2 (two) times daily as needed for Pain.    NIFEdipine (ADALAT CC) 90 MG TbSR TAKE 1 TABLET(90 MG) BY MOUTH EVERY DAY    NIFEdipine (PROCARDIA-XL) 30 MG (OSM) 24 hr tablet Take 1 tablet (30 mg total) by mouth once daily.    pravastatin (PRAVACHOL) 20 MG tablet TAKE 1 TABLET(20 MG) BY MOUTH EVERY EVENING    pregabalin (LYRICA) 300 MG Cap Take 1 capsule (300 mg total) by mouth 2 (two) times daily.    PREVIDENT 5000 BOOSTER PLUS 1.1 % Pste SMARTSIG:To Teeth    PREVIDENT 5000 SENSITIVE 1.1-5 % Pste BRUSH FOR 2 MINUTES TWICE PER DAY    RABEprazole (ACIPHEX) 20 mg tablet Take 1 tablet (20 mg total) by mouth 2 (two) times daily.    sars-cov-2, covid-19, (MODERNA COVID-19) 50 mcg/0.25 ml injection (BOOSTER) Inject into the muscle. (Patient not taking: Reported on 5/3/2022)    sodium chloride 0.9% 0.9 % irrigation Irrigate with 2 mLs as directed daily as needed (wound cleaning).    tadalafil (ADCIRCA) 20 mg Tab Take 2 tablets (40 mg total) by mouth once daily.    tiZANidine (ZANAFLEX) 4 MG tablet Take 1 tablet (4 mg total) by mouth nightly as needed (muscle pain).   Last reviewed on 5/27/2022  9:09 AM by Viktoriya Camara NP    Review of patient's allergies indicates:   Allergen Reactions    Sulfa (sulfonamide antibiotics)      Other reaction(s): Rash    Tramadol Itching    Last reviewed on  6/3/2022 1:50 PM by Lizy Lawrence      Tasks added this encounter   7/3/2022 - Refill Call (Auto Added)   Tasks due within next 3 months   3/12/2022 - Refill Call (Auto Added)     Mckenna Garrison, Patient Care Assistant  [facility]  [address] [place] [address]  Phone: [phone]  Fax: [phone]

## 2022-06-27 ENCOUNTER — TELEPHONE (OUTPATIENT)
Dept: GASTROENTEROLOGY | Facility: CLINIC | Age: 72
End: 2022-06-27
Payer: MEDICARE

## 2022-06-27 RX ORDER — RABEPRAZOLE SODIUM 20 MG/1
20 TABLET, DELAYED RELEASE ORAL 2 TIMES DAILY
Qty: 60 TABLET | Refills: 11 | Status: SHIPPED | OUTPATIENT
Start: 2022-06-27 | End: 2023-03-22

## 2022-06-27 NOTE — TELEPHONE ENCOUNTER
[facility] explained [facility] RX rabeprazole transferred from [facility] to [facility] [place].  Pt was told rx needed PA.  Explained will ask Dr Mendoza to sent Rx to [facility] pharm to help with PA.  After PA obtained pt can have rx mailed or can pickup at [facility] pharm.    Pt prefers to  at [facility] location.

## 2022-06-27 NOTE — TELEPHONE ENCOUNTER
Called jean warren explaining pt's rx aciphex has been cancelled by dr quinonez because it has been re-ordered and sent to another pharmacy.  Direct ph no provided if pharmacy has any questions.

## 2022-06-29 ENCOUNTER — TELEPHONE (OUTPATIENT)
Dept: PHARMACY | Facility: CLINIC | Age: 72
End: 2022-06-29
Payer: MEDICARE

## 2022-06-30 ENCOUNTER — ANESTHESIA EVENT (OUTPATIENT)
Dept: ENDOSCOPY | Facility: HOSPITAL | Age: 72
End: 2022-06-30
Payer: MEDICARE

## 2022-06-30 ENCOUNTER — PATIENT MESSAGE (OUTPATIENT)
Dept: RHEUMATOLOGY | Facility: CLINIC | Age: 72
End: 2022-06-30
Payer: MEDICARE

## 2022-06-30 NOTE — ANESTHESIA PREPROCEDURE EVALUATION
06/30/2022  Yamel Shah is a 71 y.o., female.  Patient Active Problem List   Diagnosis    Scleroderma with pulmonary involvement    Idiopathic neuropathy    Pulmonary hypertension associated with systemic disorder    Venous insufficiency of both lower extremities    Telangiectasia    ILD (interstitial lung disease)    Fecal incontinence    Urinary incontinence    GERD (gastroesophageal reflux disease)    Vitamin D deficiency    Osteopenia    Skin ulcers of both feet    Cutaneous scleroderma    Raynaud's disease without gangrene    contracture    Essential hypertension    ROXANNE (iron deficiency anemia)    Arterial insufficiency with ischemic ulcer    Chronic pain disorder    Dysphagia    Stiffness in joint    Painful cervical range of motion    Weakness of both upper extremities    Posture abnormality    Abnormal finding of blood chemistry, unspecified     Pulmonary hypertension    Cervical spondylosis    Pelvic floor dysfunction    PVD (peripheral vascular disease)    Pre-procedure lab exam           Pre-op Assessment          Review of Systems      Physical Exam    Airway:  Mallampati: III   Mouth Opening: Normal  TM Distance: < 4 cm  No airway management difficulties anticipated  Dental:  Prominent Incisors  No active dental issues noted  Chest/Lungs:  Clear to auscultation    Heart:  Rate: Normal  Rhythm: Regular Rhythm  Sounds: Normal        Anesthesia Plan  Type of Anesthesia, risks & benefits discussed:    Anesthesia Type: Gen Natural Airway  Informed Consent: Informed consent signed with the Patient and all parties understand the risks and agree with anesthesia plan.  All questions answered.   ASA Score: 3  Anesthesia Plan Notes: Chart reviewed. Patient seen and examined. Anesthesia plan discussed and questions answered. E-consent signed. Darrius Roa  MD    Ready For Surgery From Anesthesia Perspective.     .

## 2022-07-01 ENCOUNTER — ANESTHESIA (OUTPATIENT)
Dept: ENDOSCOPY | Facility: HOSPITAL | Age: 72
End: 2022-07-01
Payer: MEDICARE

## 2022-07-01 ENCOUNTER — HOSPITAL ENCOUNTER (OUTPATIENT)
Facility: HOSPITAL | Age: 72
Discharge: HOME | End: 2022-07-01
Attending: INTERNAL MEDICINE | Admitting: INTERNAL MEDICINE
Payer: MEDICARE

## 2022-07-01 VITALS
HEIGHT: 65 IN | HEART RATE: 66 BPM | WEIGHT: 154 LBS | OXYGEN SATURATION: 98 % | RESPIRATION RATE: 17 BRPM | BODY MASS INDEX: 25.66 KG/M2 | TEMPERATURE: 98 F | DIASTOLIC BLOOD PRESSURE: 75 MMHG | SYSTOLIC BLOOD PRESSURE: 109 MMHG

## 2022-07-01 DIAGNOSIS — D50.9 IDA (IRON DEFICIENCY ANEMIA): ICD-10-CM

## 2022-07-01 DIAGNOSIS — D50.9 IRON DEFICIENCY ANEMIA, UNSPECIFIED IRON DEFICIENCY ANEMIA TYPE: Primary | ICD-10-CM

## 2022-07-01 PROCEDURE — 88342 IMHCHEM/IMCYTCHM 1ST ANTB: CPT | Mod: TXP | Performed by: PATHOLOGY

## 2022-07-01 PROCEDURE — 25000003 PHARM REV CODE 250: Mod: TXP | Performed by: INTERNAL MEDICINE

## 2022-07-01 PROCEDURE — 37000008 HC ANESTHESIA 1ST 15 MINUTES: Mod: NTX | Performed by: INTERNAL MEDICINE

## 2022-07-01 PROCEDURE — 88342 CHG IMMUNOCYTOCHEMISTRY: ICD-10-PCS | Mod: 26,NTX,, | Performed by: PATHOLOGY

## 2022-07-01 PROCEDURE — 25000003 PHARM REV CODE 250: Mod: TXP | Performed by: NURSE ANESTHETIST, CERTIFIED REGISTERED

## 2022-07-01 PROCEDURE — 63600175 PHARM REV CODE 636 W HCPCS: Mod: NTX | Performed by: NURSE ANESTHETIST, CERTIFIED REGISTERED

## 2022-07-01 PROCEDURE — 43239 EGD BIOPSY SINGLE/MULTIPLE: CPT | Mod: NTX,,, | Performed by: INTERNAL MEDICINE

## 2022-07-01 PROCEDURE — D9220A PRA ANESTHESIA: Mod: ANES,NTX,, | Performed by: ANESTHESIOLOGY

## 2022-07-01 PROCEDURE — 43239 PR EGD, FLEX, W/BIOPSY, SGL/MULTI: ICD-10-PCS | Mod: NTX,,, | Performed by: INTERNAL MEDICINE

## 2022-07-01 PROCEDURE — 43239 EGD BIOPSY SINGLE/MULTIPLE: CPT | Mod: TXP | Performed by: INTERNAL MEDICINE

## 2022-07-01 PROCEDURE — 88305 TISSUE EXAM BY PATHOLOGIST: CPT | Mod: 26,NTX,, | Performed by: PATHOLOGY

## 2022-07-01 PROCEDURE — 88342 IMHCHEM/IMCYTCHM 1ST ANTB: CPT | Mod: 26,NTX,, | Performed by: PATHOLOGY

## 2022-07-01 PROCEDURE — D9220A PRA ANESTHESIA: ICD-10-PCS | Mod: ANES,NTX,, | Performed by: ANESTHESIOLOGY

## 2022-07-01 PROCEDURE — 27201012 HC FORCEPS, HOT/COLD, DISP: Mod: TXP | Performed by: INTERNAL MEDICINE

## 2022-07-01 PROCEDURE — D9220A PRA ANESTHESIA: ICD-10-PCS | Mod: CRNA,NTX,, | Performed by: NURSE ANESTHETIST, CERTIFIED REGISTERED

## 2022-07-01 PROCEDURE — 88305 TISSUE EXAM BY PATHOLOGIST: CPT | Mod: NTX | Performed by: PATHOLOGY

## 2022-07-01 PROCEDURE — D9220A PRA ANESTHESIA: Mod: CRNA,NTX,, | Performed by: NURSE ANESTHETIST, CERTIFIED REGISTERED

## 2022-07-01 PROCEDURE — 37000009 HC ANESTHESIA EA ADD 15 MINS: Mod: TXP | Performed by: INTERNAL MEDICINE

## 2022-07-01 PROCEDURE — 88305 TISSUE EXAM BY PATHOLOGIST: ICD-10-PCS | Mod: 26,NTX,, | Performed by: PATHOLOGY

## 2022-07-01 RX ORDER — SODIUM CHLORIDE 9 MG/ML
INJECTION, SOLUTION INTRAVENOUS CONTINUOUS
Status: DISCONTINUED | OUTPATIENT
Start: 2022-07-01 | End: 2022-07-01 | Stop reason: HOSPADM

## 2022-07-01 RX ORDER — ONDANSETRON 2 MG/ML
4 INJECTION INTRAMUSCULAR; INTRAVENOUS ONCE AS NEEDED
Status: DISCONTINUED | OUTPATIENT
Start: 2022-07-01 | End: 2022-07-01 | Stop reason: HOSPADM

## 2022-07-01 RX ORDER — HYDROMORPHONE HYDROCHLORIDE 1 MG/ML
0.2 INJECTION, SOLUTION INTRAMUSCULAR; INTRAVENOUS; SUBCUTANEOUS EVERY 5 MIN PRN
Status: DISCONTINUED | OUTPATIENT
Start: 2022-07-01 | End: 2022-07-01 | Stop reason: HOSPADM

## 2022-07-01 RX ORDER — FENTANYL CITRATE 50 UG/ML
25 INJECTION, SOLUTION INTRAMUSCULAR; INTRAVENOUS EVERY 5 MIN PRN
Status: DISCONTINUED | OUTPATIENT
Start: 2022-07-01 | End: 2022-07-01 | Stop reason: HOSPADM

## 2022-07-01 RX ORDER — LIDOCAINE HYDROCHLORIDE 20 MG/ML
INJECTION INTRAVENOUS
Status: DISCONTINUED | OUTPATIENT
Start: 2022-07-01 | End: 2022-07-01

## 2022-07-01 RX ORDER — DIPHENHYDRAMINE HYDROCHLORIDE 50 MG/ML
25 INJECTION INTRAMUSCULAR; INTRAVENOUS EVERY 6 HOURS PRN
Status: DISCONTINUED | OUTPATIENT
Start: 2022-07-01 | End: 2022-07-01 | Stop reason: HOSPADM

## 2022-07-01 RX ORDER — PROPOFOL 10 MG/ML
VIAL (ML) INTRAVENOUS
Status: DISCONTINUED | OUTPATIENT
Start: 2022-07-01 | End: 2022-07-01

## 2022-07-01 RX ORDER — PROPOFOL 10 MG/ML
VIAL (ML) INTRAVENOUS CONTINUOUS PRN
Status: DISCONTINUED | OUTPATIENT
Start: 2022-07-01 | End: 2022-07-01

## 2022-07-01 RX ORDER — SODIUM CHLORIDE 0.9 % (FLUSH) 0.9 %
10 SYRINGE (ML) INJECTION
Status: DISCONTINUED | OUTPATIENT
Start: 2022-07-01 | End: 2022-07-01 | Stop reason: HOSPADM

## 2022-07-01 RX ADMIN — LIDOCAINE HYDROCHLORIDE 75 MG: 20 INJECTION, SOLUTION INTRAVENOUS at 08:07

## 2022-07-01 RX ADMIN — PROPOFOL 70 MG: 10 INJECTION, EMULSION INTRAVENOUS at 08:07

## 2022-07-01 RX ADMIN — Medication 175 MCG/KG/MIN: at 08:07

## 2022-07-01 RX ADMIN — SODIUM CHLORIDE: 0.9 INJECTION, SOLUTION INTRAVENOUS at 07:07

## 2022-07-01 NOTE — PLAN OF CARE
Discharge instructions given to patient. Copies provided. Patient voiced understanding. VSS. No c/o pain or nausea. No s/s of distress noted. No RX given. MD spoke with patient at bedside. Patient meets criteria for discharge. Patient taken to vehicle via wc.

## 2022-07-01 NOTE — H&P
Short Stay Endoscopy History and Physical    PCP - Aly Rand MD     Procedure - EGD  ASA - per anesthesia  Mallampati - per anesthesia  History of Anesthesia problems - no  Family history Anesthesia problems -  no   Plan of anesthesia - General    HPI:  This is a 71 y.o. female here for evaluation of ROXANNE        Medical History:  has a past medical history of Abnormal Pap smear, Acid reflux, Allergy, Arthritis, Encounter for blood transfusion, GERD (gastroesophageal reflux disease), History of migraine headaches, colonic polyp, Hypertension, Idiopathic neuropathy (7/20/2012), ILD (interstitial lung disease) (11/6/2013), Iron deficiency anemia (3/18/2014), MRSA carrier, Osteopenia, Pneumonia, Pulmonary fibrosis, Pulmonary hypertension, Raynaud's disease, Scleroderma, diffuse, Sjogren's syndrome, and Vitamin D deficiency (11/14/2013).    Surgical History:  has a past surgical history that includes Dilation and curettage of uterus; Breast biopsy; Cervical conization w/ laser (1970); Hysterectomy (1990); Esophagogastroduodenoscopy; Colonoscopy; Esophagogastroduodenoscopy (N/A, 3/29/2019); Colonoscopy (N/A, 3/29/2019); Esophageal manometry with measurement of impedance (N/A, 3/4/2020); 24 hour impedance pH monitoring of esophagus in patient not taking acid reducing medications (N/A, 3/4/2020); Varicose vein surgery; Right heart catheterization (Right, 1/5/2021); Esophagogastroduodenoscopy (N/A, 2/2/2021); Colonoscopy (N/A, 5/10/2021); and Anorectal manometry (N/A, 5/10/2021).    Family History: family history includes Breast cancer in her maternal aunt, mother, and sister; Diabetes in her brother and sister; Hypertension in her father; No Known Problems in her daughter, daughter, son, and son; Osteoarthritis in her brother.. Otherwise no colon cancer, inflammatory bowel disease, or GI malignancies.    Social History:  reports that she has never smoked. She has never used smokeless tobacco. She reports current  alcohol use. She reports that she does not use drugs.    Review of patient's allergies indicates:   Allergen Reactions    Sulfa (sulfonamide antibiotics)      Other reaction(s): Rash    Tramadol Itching       Medications:   Facility-Administered Medications Prior to Admission   Medication Dose Route Frequency Provider Last Rate Last Admin    acetaminophen tablet 650 mg  650 mg Oral Once PRN Darrius Rose MD        albuterol inhaler 2 puff  2 puff Inhalation Q20 Min PRN Darrius Rose MD        diphenhydrAMINE injection 25 mg  25 mg Intravenous Once PRN Darrius Rose MD        EPINEPHrine (EPIPEN) 0.3 mg/0.3 mL pen injection 0.3 mg  0.3 mg Intramuscular PRN Darrius Rose MD        methylPREDNISolone sodium succinate injection 40 mg  40 mg Intravenous Once PRN Darrius Rose MD        ondansetron disintegrating tablet 4 mg  4 mg Oral Once PRN Darrius Rose MD        sodium chloride 0.9% 500 mL flush bag   Intravenous PRN Darrius Rose MD        sodium chloride 0.9% flush 10 mL  10 mL Intravenous PRN Darrius Rose MD         Medications Prior to Admission   Medication Sig Dispense Refill Last Dose    ergocalciferol (ERGOCALCIFEROL) 50,000 unit Cap Take 1 capsule (50,000 Units total) by mouth every 7 days. 12 capsule 1 Past Week at Unknown time    ferrous sulfate 325 (65 FE) MG EC tablet Take 325 mg by mouth 2 (two) times daily.   Past Week at Unknown time    mycophenolate mofetil (CELLCEPT) 200 mg/mL SusR Take 5 mLs (1,000 mg total) by mouth 2 (two) times daily. 480 mL 1 6/30/2022    nabumetone (RELAFEN) 750 MG tablet Take 1 tablet (750 mg total) by mouth 2 (two) times daily as needed for Pain. 60 tablet 3 6/30/2022 at Unknown time    NIFEdipine (ADALAT CC) 90 MG TbSR TAKE 1 TABLET(90 MG) BY MOUTH EVERY DAY 90 tablet 3 6/30/2022 at Unknown time    pravastatin (PRAVACHOL) 20 MG tablet TAKE 1 TABLET(20 MG) BY MOUTH EVERY EVENING 90 tablet 1 6/30/2022 at Unknown time     pregabalin (LYRICA) 300 MG Cap Take 1 capsule (300 mg total) by mouth 2 (two) times daily. 60 capsule 6 6/30/2022 at Unknown time    RABEprazole (ACIPHEX) 20 mg tablet Take 1 tablet (20 mg total) by mouth 2 (two) times daily. 60 tablet 11 6/30/2022 at Unknown time    tadalafil (ADCIRCA) 20 mg Tab Take 2 tablets (40 mg total) by mouth once daily. 28 tablet 11 6/30/2022 at Unknown time    tiZANidine (ZANAFLEX) 4 MG tablet Take 1 tablet (4 mg total) by mouth nightly as needed (muscle pain). 30 tablet 3 6/30/2022 at Unknown time    acetaminophen-codeine 300-30mg (TYLENOL #3) 300-30 mg Tab Take 1 tablet by mouth daily as needed (pain). 30 tablet 0     albuterol (VENTOLIN HFA) 90 mcg/actuation inhaler Inhale 2 puffs into the lungs every 4 (four) hours as needed for Wheezing. Rescue 18 g 1 More than a month at Unknown time    cadexomer iodine (IODOSORB) 0.9 % gel Apply topically daily as needed for Wound Care. (Patient not taking: No sig reported) 10 g 3     carboxymethylcellulose sodium (REFRESH TEARS) 0.5 % Drop Apply 1-2 drops to every as needed       loratadine (CLARITIN) 10 mg tablet Take 1 tablet (10 mg total) by mouth once daily. 30 tablet 11     multivitamin capsule Take 1 capsule by mouth once daily.       NIFEdipine (PROCARDIA-XL) 30 MG (OSM) 24 hr tablet Take 1 tablet (30 mg total) by mouth once daily. 30 tablet 11     PREVIDENT 5000 BOOSTER PLUS 1.1 % Pste SMARTSIG:To Teeth       PREVIDENT 5000 SENSITIVE 1.1-5 % Pste BRUSH FOR 2 MINUTES TWICE PER DAY       sars-cov-2, covid-19, (MODERNA COVID-19) 50 mcg/0.25 ml injection (BOOSTER) Inject into the muscle. (Patient not taking: Reported on 5/3/2022) 0.25 mL 0     sodium chloride 0.9% 0.9 % irrigation Irrigate with 2 mLs as directed daily as needed (wound cleaning). 3000 mL 0        Physical Exam:    Vital Signs:   Vitals:    07/01/22 0730   BP: 139/64   Pulse: 69   Resp: 16   Temp: 99.1 °F (37.3 °C)       I have explained the risks and benefits  of endoscopy procedures to the patient including but not limited to bleeding, perforation, infection, and death.      Annamaria Mendoza MD

## 2022-07-01 NOTE — ANESTHESIA POSTPROCEDURE EVALUATION
Anesthesia Post Evaluation    Patient: Yamel Shah    Procedure(s) Performed: Procedure(s) (LRB):  ESOPHAGOGASTRODUODENOSCOPY (EGD) (N/A)    Final Anesthesia Type: general      Level of consciousness: awake and alert  Post-procedure vital signs: reviewed and stable  Pain control: Pain has been treated.  Airway patency: patent    PONV status: Absent or treated.  Anesthetic complications: no      Cardiovascular status: hemodynamically stable  Respiratory status: unassisted  Hydration status: euvolemic            Vitals Value Taken Time   /75 07/01/22 0900   Temp 36.4 °C (97.5 °F) 07/01/22 0825   Pulse 63 07/01/22 0901   Resp 43 07/01/22 0900   SpO2 98 % 07/01/22 0901   Vitals shown include unvalidated device data.      No case tracking events are documented in the log.      Pain/Lory Score: Lory Score: 10 (7/1/2022  9:10 AM)

## 2022-07-01 NOTE — TRANSFER OF CARE
"Anesthesia Transfer of Care Note    Patient: Yamel Shah    Procedure(s) Performed: Procedure(s) (LRB):  ESOPHAGOGASTRODUODENOSCOPY (EGD) (N/A)    Patient location: PACU    Anesthesia Type: general    Transport from OR: Transported from OR on room air with adequate spontaneous ventilation    Post pain: adequate analgesia    Post assessment: no apparent anesthetic complications and tolerated procedure well    Post vital signs: stable    Level of consciousness: awake, alert and oriented    Nausea/Vomiting: no nausea/vomiting    Complications: none    Transfer of care protocol was followed      Last vitals:   Visit Vitals  /55   Pulse 62   Temp 37.3 °C (99.1 °F)   Resp 16   Ht 5' 5" (1.651 m)   Wt 69.9 kg (154 lb)   SpO2 99%   Breastfeeding No   BMI 25.63 kg/m²     "

## 2022-07-01 NOTE — PROVATION PATIENT INSTRUCTIONS
Discharge Summary/Instructions after an Endoscopic Procedure  Patient Name: Yamel Shah  Patient MRN: [id number]  Patient YOB: 1950 Friday, July 1, 2022  Annamaria Mendoza MD  Dear patient,  As a result of recent federal legislation (The Federal Cures Act), you may   receive lab or pathology results from your procedure in your [facility]   account before your physician is able to contact you. Your physician or   their representative will relay the results to you with their   recommendations at their soonest availability.  Thank you,  RESTRICTIONS:  During your procedure today, you received medications for sedation.  These   medications may affect your judgment, balance and coordination.  Therefore,   for 24 hours, you have the following restrictions:   - DO NOT drive a car, operate machinery, make legal/financial decisions,   sign important papers or drink alcohol.    ACTIVITY:  Today: no heavy lifting, straining or running due to procedural   sedation/anesthesia.  The following day: return to full activity including work.  DIET:  Eat and drink normally unless instructed otherwise.     TREATMENT FOR COMMON SIDE EFFECTS:  - Mild abdominal pain, nausea, belching, bloating or excessive gas:  rest,   eat lightly and use a heating pad.  - Sore Throat: treat with throat lozenges and/or gargle with warm salt   water.  - Because air was used during the procedure, expelling large amounts of air   from your rectum or belching is normal.  - If a bowel prep was taken, you may not have a bowel movement for 1-3 days.    This is normal.  SYMPTOMS TO WATCH FOR AND REPORT TO YOUR PHYSICIAN:  1. Abdominal pain or bloating, other than gas cramps.  2. Chest pain.  3. Back pain.  4. Signs of infection such as: chills or fever occurring within 24 hours   after the procedure.  5. Rectal bleeding, which would show as bright red, maroon, or black stools.   (A tablespoon of blood from the rectum is not serious, especially if    hemorrhoids are present.)  6. Vomiting.  7. Weakness or dizziness.  GO DIRECTLY TO THE NEAREST EMERGENCY ROOM IF YOU HAVE ANY OF THE FOLLOWING:      Difficulty breathing              Chills and/or fever over 101 F   Persistent vomiting and/or vomiting blood   Severe abdominal pain   Severe chest pain   Black, tarry stools   Bleeding- more than one tablespoon   Any other symptom or condition that you feel may need urgent attention  Your doctor recommends these additional instructions:  If any biopsies were taken, your doctors clinic will contact you in 1 to 2   weeks with any results.  - Await pathology results.   - Discharge patient to home (with escort).   - Resume previous diet.   - Continue present medications.   - The findings and recommendations were discussed with the patient.   - Patient has a contact number available for emergencies.  The signs and   symptoms of potential delayed complications were discussed with the   patient.  Return to normal activities tomorrow.  Written discharge   instructions were provided to the patient.  For questions, problems or results please call your physician - Annamaria Mendoza MD at Work:  [phone].  [facility], EMERGENCY ROOM PHONE NUMBER: [phone]  IF A COMPLICATION OR EMERGENCY SITUATION ARISES AND YOU ARE UNABLE TO REACH   YOUR PHYSICIAN - GO DIRECTLY TO THE EMERGENCY ROOM.  Annamaria Mendoza MD  7/1/2022 8:23:59 AM  This report has been verified and signed electronically.  Dear patient,  As a result of recent federal legislation (The Federal Cures Act), you may   receive lab or pathology results from your procedure in your [facility]   account before your physician is able to contact you. Your physician or   their representative will relay the results to you with their   recommendations at their soonest availability.  Thank you,  PROVATION

## 2022-07-05 ENCOUNTER — SPECIALTY PHARMACY (OUTPATIENT)
Dept: PHARMACY | Facility: CLINIC | Age: 72
End: 2022-07-05
Payer: MEDICARE

## 2022-07-05 NOTE — TELEPHONE ENCOUNTER
Specialty Pharmacy - Refill Coordination    Specialty Medication Orders Linked to Encounter    Flowsheet Row Most Recent Value   Medication #1 mycophenolate mofetil (CELLCEPT) 200 mg/mL SusR (Order#[id number], Rx#[id number])          Refill Questions - Documented Responses    Flowsheet Row Most Recent Value   Patient Availability and HIPAA Verification    Does patient want to proceed with activity? Yes   HIPAA/medical authority confirmed? Yes   Relationship to patient of person spoken to? Self   Refill Screening Questions    Would patient like to speak to a pharmacist? No   When does the patient need to receive the medication? 07/13/22   Refill Delivery Questions    How will the patient receive the medication? Delivery Subha   When does the patient need to receive the medication? 07/13/22   Shipping Address Home   Address in [facility] confirmed and updated if neccessary? Yes   Expected Copay ($) 15   Is the patient able to afford the medication copay? Yes   Payment Method CC on file   Days supply of Refill 32   Supplies needed? No supplies needed   Refill activity completed? Yes   Refill activity plan Refill scheduled   Shipment/Pickup Date: 07/05/22          Current Outpatient Medications   Medication Sig    acetaminophen-codeine 300-30mg (TYLENOL #3) 300-30 mg Tab Take 1 tablet by mouth daily as needed (pain).    albuterol (VENTOLIN HFA) 90 mcg/actuation inhaler Inhale 2 puffs into the lungs every 4 (four) hours as needed for Wheezing. Rescue    cadexomer iodine (IODOSORB) 0.9 % gel Apply topically daily as needed for Wound Care. (Patient not taking: No sig reported)    carboxymethylcellulose sodium (REFRESH TEARS) 0.5 % Drop Apply 1-2 drops to every as needed    ergocalciferol (ERGOCALCIFEROL) 50,000 unit Cap Take 1 capsule (50,000 Units total) by mouth every 7 days.    ferrous sulfate 325 (65 FE) MG EC tablet Take 325 mg by mouth 2 (two) times daily.    loratadine (CLARITIN) 10 mg tablet Take 1  tablet (10 mg total) by mouth once daily.    multivitamin capsule Take 1 capsule by mouth once daily.    mycophenolate mofetil (CELLCEPT) 200 mg/mL SusR Take 5 mLs (1,000 mg total) by mouth 2 (two) times daily.    nabumetone (RELAFEN) 750 MG tablet Take 1 tablet (750 mg total) by mouth 2 (two) times daily as needed for Pain.    NIFEdipine (ADALAT CC) 90 MG TbSR TAKE 1 TABLET(90 MG) BY MOUTH EVERY DAY    NIFEdipine (PROCARDIA-XL) 30 MG (OSM) 24 hr tablet Take 1 tablet (30 mg total) by mouth once daily.    pravastatin (PRAVACHOL) 20 MG tablet TAKE 1 TABLET(20 MG) BY MOUTH EVERY EVENING    pregabalin (LYRICA) 300 MG Cap Take 1 capsule (300 mg total) by mouth 2 (two) times daily.    PREVIDENT 5000 BOOSTER PLUS 1.1 % Pste SMARTSIG:To Teeth    PREVIDENT 5000 SENSITIVE 1.1-5 % Pste BRUSH FOR 2 MINUTES TWICE PER DAY    RABEprazole (ACIPHEX) 20 mg tablet Take 1 tablet (20 mg total) by mouth 2 (two) times daily.    sars-cov-2, covid-19, (MODERNA COVID-19) 50 mcg/0.25 ml injection (BOOSTER) Inject into the muscle. (Patient not taking: Reported on 5/3/2022)    sodium chloride 0.9% 0.9 % irrigation Irrigate with 2 mLs as directed daily as needed (wound cleaning).    tadalafil (ADCIRCA) 20 mg Tab Take 2 tablets (40 mg total) by mouth once daily.    tiZANidine (ZANAFLEX) 4 MG tablet Take 1 tablet (4 mg total) by mouth nightly as needed (muscle pain).   Last reviewed on 7/1/2022  7:25 AM by Aaron Parikh RN    Review of patient's allergies indicates:   Allergen Reactions    Sulfa (sulfonamide antibiotics)      Other reaction(s): Rash    Tramadol Itching    Last reviewed on  7/1/2022 8:39 AM by Norma Hernandez      Tasks added this encounter   No tasks added.   Tasks due within next 3 months   3/12/2022 - Refill Call (Auto Added)     Iva [facility]  [id number] [facility] [address]  Phone: [phone]  Fax: [phone]

## 2022-07-12 ENCOUNTER — PATIENT MESSAGE (OUTPATIENT)
Dept: ADMINISTRATIVE | Facility: OTHER | Age: 72
End: 2022-07-12
Payer: MEDICARE

## 2022-07-12 LAB
FINAL PATHOLOGIC DIAGNOSIS: NORMAL
Lab: NORMAL

## 2022-07-15 ENCOUNTER — PES CALL (OUTPATIENT)
Dept: ADMINISTRATIVE | Facility: CLINIC | Age: 72
End: 2022-07-15
Payer: MEDICARE

## 2022-08-26 ENCOUNTER — OFFICE VISIT (OUTPATIENT)
Dept: PAIN MEDICINE | Facility: CLINIC | Age: 72
End: 2022-08-26
Payer: MEDICARE

## 2022-08-26 ENCOUNTER — PATIENT MESSAGE (OUTPATIENT)
Dept: ADMINISTRATIVE | Facility: CLINIC | Age: 72
End: 2022-08-26
Payer: MEDICARE

## 2022-08-26 ENCOUNTER — SPECIALTY PHARMACY (OUTPATIENT)
Dept: PHARMACY | Facility: CLINIC | Age: 72
End: 2022-08-26
Payer: MEDICARE

## 2022-08-26 ENCOUNTER — TELEPHONE (OUTPATIENT)
Dept: ADMINISTRATIVE | Facility: CLINIC | Age: 72
End: 2022-08-26
Payer: MEDICARE

## 2022-08-26 VITALS
DIASTOLIC BLOOD PRESSURE: 67 MMHG | WEIGHT: 147.69 LBS | SYSTOLIC BLOOD PRESSURE: 132 MMHG | BODY MASS INDEX: 24.61 KG/M2 | HEIGHT: 65 IN | HEART RATE: 71 BPM | RESPIRATION RATE: 18 BRPM

## 2022-08-26 DIAGNOSIS — M79.18 MYOFASCIAL PAIN: ICD-10-CM

## 2022-08-26 DIAGNOSIS — Z79.891 ENCOUNTER FOR MONITORING OPIOID MAINTENANCE THERAPY: ICD-10-CM

## 2022-08-26 DIAGNOSIS — G89.4 CHRONIC PAIN DISORDER: Primary | ICD-10-CM

## 2022-08-26 DIAGNOSIS — G89.4 CHRONIC PAIN DISORDER: ICD-10-CM

## 2022-08-26 DIAGNOSIS — M50.30 DDD (DEGENERATIVE DISC DISEASE), CERVICAL: ICD-10-CM

## 2022-08-26 DIAGNOSIS — M34.9 SCLERODERMA: ICD-10-CM

## 2022-08-26 DIAGNOSIS — Z51.81 ENCOUNTER FOR MONITORING OPIOID MAINTENANCE THERAPY: ICD-10-CM

## 2022-08-26 DIAGNOSIS — G60.9 IDIOPATHIC NEUROPATHY: ICD-10-CM

## 2022-08-26 DIAGNOSIS — M34.89 CUTANEOUS SCLERODERMA: ICD-10-CM

## 2022-08-26 DIAGNOSIS — M47.812 CERVICAL SPONDYLOSIS: ICD-10-CM

## 2022-08-26 PROCEDURE — 80326 AMPHETAMINES 5 OR MORE: CPT | Mod: TXP | Performed by: NURSE PRACTITIONER

## 2022-08-26 PROCEDURE — 99214 PR OFFICE/OUTPT VISIT, EST, LEVL IV, 30-39 MIN: ICD-10-PCS | Mod: S$PBB,,, | Performed by: NURSE PRACTITIONER

## 2022-08-26 PROCEDURE — 99999 PR PBB SHADOW E&M-EST. PATIENT-LVL V: ICD-10-PCS | Mod: PBBFAC,,, | Performed by: NURSE PRACTITIONER

## 2022-08-26 PROCEDURE — 99215 OFFICE O/P EST HI 40 MIN: CPT | Mod: PBBFAC,NTX | Performed by: NURSE PRACTITIONER

## 2022-08-26 PROCEDURE — 99214 OFFICE O/P EST MOD 30 MIN: CPT | Mod: S$PBB,,, | Performed by: NURSE PRACTITIONER

## 2022-08-26 PROCEDURE — 99999 PR PBB SHADOW E&M-EST. PATIENT-LVL V: CPT | Mod: PBBFAC,,, | Performed by: NURSE PRACTITIONER

## 2022-08-26 RX ORDER — NABUMETONE 750 MG/1
750 TABLET, FILM COATED ORAL 2 TIMES DAILY PRN
Qty: 60 TABLET | Refills: 3 | Status: SHIPPED | OUTPATIENT
Start: 2022-08-26 | End: 2022-12-30 | Stop reason: SDUPTHER

## 2022-08-26 RX ORDER — PREGABALIN 300 MG/1
300 CAPSULE ORAL 2 TIMES DAILY
Qty: 60 CAPSULE | Refills: 6 | Status: SHIPPED | OUTPATIENT
Start: 2022-08-26 | End: 2022-12-30 | Stop reason: SDUPTHER

## 2022-08-26 RX ORDER — TIZANIDINE 4 MG/1
4 TABLET ORAL NIGHTLY PRN
Qty: 30 TABLET | Refills: 3 | Status: SHIPPED | OUTPATIENT
Start: 2022-08-26 | End: 2022-12-30 | Stop reason: SDUPTHER

## 2022-08-26 RX ORDER — MYCOPHENOLATE MOFETIL 200 MG/ML
1000 POWDER, FOR SUSPENSION ORAL 2 TIMES DAILY
Qty: 480 ML | Refills: 1 | Status: SHIPPED | OUTPATIENT
Start: 2022-08-26 | End: 2023-01-26 | Stop reason: SDUPTHER

## 2022-08-26 RX ORDER — ACETAMINOPHEN AND CODEINE PHOSPHATE 300; 30 MG/1; MG/1
1 TABLET ORAL DAILY PRN
Qty: 30 TABLET | Refills: 0 | Status: SHIPPED | OUTPATIENT
Start: 2022-08-26 | End: 2022-09-25

## 2022-08-26 NOTE — TELEPHONE ENCOUNTER
Patients reports only a few doses on hand for Cellcept, sent refill request to provider. I informed patient we will call her back as soon as refill is approved.

## 2022-08-26 NOTE — TELEPHONE ENCOUNTER
Called pt; no answer; left message informing patient I was calling to confirm her virtual EAWV on 8/29/22 at 1:00pm and to see if she needed any help with setting up for virtual appt or e-pre check and to login 15 minutes prior to scheduled appt; left my name & number for pt to return my call if she has any questions or concerns; sent message through portal

## 2022-08-26 NOTE — PROGRESS NOTES
Chronic Pain - Follow Up  Chronic Pain-Tele-Medicine-Established Note (Follow up visit)        The patient location is: Home  The chief complaint leading to consultation is: pain  Visit type: Virtual visit with synchronous audio and video  Total time spent with patient: 25 min  Each patient to whom he or she provides medical services by telemedicine is:  (1) informed of the relationship between the physician and patient and the respective role of any other health care provider with respect to management of the patient; and (2) notified that he or she may decline to receive medical services by telemedicine and may withdraw from such care at any time.      Referring Physician: No ref. provider found    Chief Complaint:   Chief Complaint   Patient presents with    Foot Pain        SUBJECTIVE: Disclaimer: This note has been generated using voice-recognition software. There may be typographical errors that have been missed during proof-reading    Interval History 8/26/2022:  The patient returns to clinic today for follow up neck and foot pain. She reports increased pain over the last few days. She attributes this as she is out of Lyrica. She continues to report bilateral foot pain. She continues to have wounds. She continues to perform wound care. She continues to report intermittent neck pain. She continues to take Lyrica, Zanaflex, and Relafen with benefit. She continues to take Tylenol #3 for severe pain as needed with benefit. She denies any adverse effects. She denies any other health changes. Her pain today is 4/10.    Interval History 5/26/2022:  The patient returns to clinic today for follow up of neck and foot pain via virtual visit. She continues to report bilateral foot pain. She continues to report ulcers to her feet. She continues to report neck pain with intermittent radiating pain into her arms. She continues to report benefit with current medications. She is taking Lyrica, Zanaflex, and Relafen. She  also takes Tylenol #3 with benefit. She denies any adverse effects with these medications. She continues to perform a home exercise routine. She denies any other health changes.     Interval History 2/15/22  Patient presents in follow up. Was previously Dr. King patient with primary complaints of neck and foot pain. She reports her pain has been stable since her last visit. She did have covid in January and stopped all of ehr pain medications. She got an antibody infusion. She has fully recovered. She restarted her pain medications and reports that they are effective. She is taking Tylenol 3 daily, nambumetome 750 mg BID, lyrica 150 mg BID and Zanaflex 4 mg TID. She did do PT for her neck November to January per her report. She continues with mild neck pain without radiation into the arms or hands. Pain is worse with sidebending and rotation to the right and better with rest and medications. She denies any numbness tingling weakness or b/b incontinence.    Interval History 1/4/2022:  The patient returns to clinic today for follow up of foot pain via virtual visit. She continues to report foot pain and wounds. She continues to follow up with podiatry and wound care. She reports increased neck pain. This pain is tight and aching in nature. She denies any radicular arm pain. She is currently taking Relafen, Lyrica, and Tylenol #3 with benefit and without adverse effects. She reports limited benefit with Zanaflex. She denies any other health changes.     Interval History 12/8/2021:  The patient returns to clinic today for foot pain via virtual visit. She continues to report foot pain. She does have foot wounds. She recently saw Dr. Claudio in [facility]. He does not recommend any vascular interventions at this time. She continues to follow up with Podiatry and wound care. She continues to take Zanaflex, Relafen, Lyrica and Tylenol #3 with benefit. She denies any adverse effects. She denies any other health changes. Her  pain today is 7/10.    Interval History 11/8/2021:  The patient returns to clinic today for follow up of foot pain via virtual visit. At last visit, she was starting on Tylenol #3. She does find benefit with this. She sometimes breaks this in half. She continues to take Zanaflex, Relafen, and Lyrica. She continues to report bilateral foot pain and ulcers. She continues to follow up with podiatry. Her pain is worse at night. She denies any other health changes. Her pain today is 7/10.    Interval History 10/20/2021:  The patient returns to clinic today for follow up of foot pain. At last visit, she was started on Tramadol. She did have relief but had significant side effects. She experienced sedation, itching, headaches, and decreased appetite. This has resolved after stopping the medication. She continues to report foot pain. This pain is worse at night. She continues to follow up with podiatry. She continues to take Tizanidine, Relafen, and Lyrica with some benefit. She denies any adverse effects. She denies any other health changes. Her pain today is 8/10.    Interval history 10/06/2021:  Since previous encounter the patient continues to have nonhealing wound has been followed up with wound care and is scheduled to follow with Rheumatology for the wounds in her legs she was referred to podiatry with stated that they have done nothing different me that she with doing on her own.  She continues to have neck pain and bilateral foot pain.  She has been taking tizanidine Relafen Tylenol p.m. and Lyrica 600 mg per day.  She states that all these medications are helpful.    Interval History 6/3/2021:  The patient returns to clinic today for follow up of foot pain. She continues to report left foot pain secondary to ulcer. She is seeing Wound Care. She describes this pain as burning in nature. Her pain is worse with prolonged activity. She reports intermittent neck pain. She continues to take Zanaflex. She reports limited  benefit with increased Lyrica dose. She denies any other health changes. Her pain today is 5/10.    Interval History 5/5/2021:  The patient returns to clinic today for follow up of foot pain. She reports a new ulcer on her left foot. She is seeing Wound Care. She reports increased pain with this new ulcer. She describes this pain as burning. She continues to report neck pain that is tight and aching. She denies any radicular arm pain. Her pain is worse with prolonged activity, especially turning her head to the side. She continues to perform a home exercise routine. She continues to take Lyrica and Zanaflex with benefit. She denies any other health changes. Her pain today is 5/10.    Interval History 2/8/2021:  The patient returns to clinic today for follow up of neck and foot pain. She reports that her neck pain has significantly improved since last visit. She has recently completed physical therapy for this. She is performing a home stretching routine. She reports intermittent neck pain that is tight and aching in nature. She denies any radicular arm pain. She continues to report bilateral foot pain. This is worse at night. She continues to take Lyrica and Zanaflex with benefit. She denies any other health changes. Her pain today is 4/10.    Interval History 1/8/2021:  The patient returns to clinic today for follow up of neck and foot pain. She continues to report neck pain that is tight and aching in nature. She denies any radicular pain. She continues to participate in physical therapy and a home exercise routine. She continues to report bilateral foot pain, worse at night. She reports that increased Lyrica dose has provided improved benefit. She also takes Zanaflex with benefit. She denies any other health changes. Her pain today is 3/10.    Interval History 11/13/2020:  The patient returns to clinic today for follow up of neck and foot pain. She continues to report bilateral foot pain. Since last visit, she  had a venous ablation procedure on her right leg through Vascular. She is scheduled for the left side in the next month. She continues to report bilateral foot pain, worse at night. She continues to report neck pain that is tight and aching in nature. She denies any radicular pain. Her pain is worse flexion and turning her head to the side. She is currently participating in physical therapy with some benefit. She continues to take Lyrica but does ask if this could be increased. She continues to take Zanaflex with benefit. She denies any other health changes. Her pain today is 5/10    Interval History 10/2/2020:  The patient returns to clinic today for follow up of foot pain. She reports increased bilateral foot pain over the last few months. This pain is burning in nature. This pain is worse at night. She continues to have ulcers to her feet related to her scleroderma. She would like to restart the Lyrica. She also reports increased neck pain that is sore and aching in nature. She denies any radiating arm pain. This is worse with turning her head and at night. She denies any other health changes. Her pain today is 7/10.    Interval History 6/5/2020:  The patient requests audio visit today for follow up of bilateral foot pain. She reports intermittent foot pain that is tolerable at this time. She has discontinued Lyrica as of April. She continues to have ulcers to her feet. She does have wound care. She denies any other health changes.     Interval History 1/17/2020:  The patient returns to clinic today for follow up. She continues to report bilateral foot pain. She describes this pain as burning and tingling in nature. At last visit, we decreased her Lyrica dose to 100 mg at night. She reports increased pain since then. She would like to go to back to the 150 mg dose. She continues to have ulcers to her feet, left worse than right. She continues to participate in a home wound care program. She denies any other  health changes. Her pain today is 6/10.    Interval History 12/17/2019:  The patient returns to clinic today for follow up. She reports improving foot pain. She reports improved healing ulcers to her left foot. She continues to report right foot pain that is burning and tingling in nature. She continues to participate in a home wound care routine. She continues to take Lyrica. She asks about decreasing this. She denies any other health changes. Her pain today is 5/10.    Interval History 9/17/2019:  The patient returns to clinic today for follow up. She continues to report bilateral foot pain that is burning and tingling in nature. Her pain is worse with sitting and at night. She continues to have slow healing ulcers to both feet. She continues to perform a home wound care program. She is no longer taking Gabapentin which was previously called in. She is now taking Pregabalin generic with benefit. She denies any other health changes. Her pain today is 4/10.    Interval History 6/13/2019:  The patient returns to clinic for follow up for bilateral foot pain. She continues to report bilateral foot pain that is burning in nature. She continues to have slow healing wounds to both feet from ulcers. She continues to take Lyrica once daily but reports increased pain. She continues to take Vitamin B12. She denies any other health changes. Her pain today is 8/10.    Interval History 3/13/2019:  The patient returns to clinic today for follow up. She continues to report bilateral foot pain that is burning and tingling in nature. Her pain is worse with prolonged walking and standing. She continues to have bilateral foot wounds. She reports that these wounds have significantly improved since last visit. She is currently taking Vitamin B12 with benefit. She continues to report benefit with Lyrica. She is currently taking this once daily. She denies any other health changes. Her pain today is 5/10.    Interval History  2/6/2019:  The patient returns to clinic today for follow up. She reports that her bilateral foot wounds have significantly improved since last visit. She does report some significant improvement in her foot pain. She reports intermittent bilateral foot pain especially with prolonged walking. She describes this pain as heavy and tingling in nature. She is no longer taking Celebrex. She is currently taking Lyrica 150 mg BID with benefit. She does report that the Lyrica is expensive for her. She denies any other health changes. Her pain today is 5/10.    Interval History 12/5/18:  Patient returns to clinic today for follow up. She reports that since increasing her medications, she is having significant improvement in pain during the day time. She is currently taking Lyrica 75mg TID and Celebrex 200mg TID. However, she states that the burning  And tingling pain in both her feet increase in the evening around 6 or 7pm. She is unable to sleep during the nights due to the pain. She is still wearing her bilateral boots due to non healing ulcers from her scleroderma.     Interval History 8/30/2018:  The patient returns to clinic today for follow up. She continues to report bilateral foot pain that is burning and tingling in nature. She reports that this pain is worse at night. She is currently taking Lyrica 75 mg BID with limited relief. She did not have any benefit with Mobic. She continues to take Aleve with some benefit. She continues to have nonhealing ulcers. She wears an ACE bandage to her right calf, as well as boots to her bilateral feet. She denies any other health changes. Her pain today is 7/10.     Interval History 7/11/2018:  Since previous encounter she has weaned from gabapentin to 600mg / day and did not notice significant worsening of pain, but was having somnelence from higher doses of the medication in the past.  We are weaning in an attempt to trial Lyrica as an alternative to gabapentin.  Tramadol  causes significant sedation, limiting its use.  She has discontinued the use of the tramadol.  Additionally she does have benefit from anti-inflammatory medication, has been using aleve, has not trialed CANTRELL-2 inhibitors in the past.    Interval history 05/16/2018:      The patient has had x-rays of the lumbar spine which did not reveal any evidence of significant neuroforaminal stenosis with degenerative disc disease.  There is mild facet arthropathy.  She has escalated her gabapentin and is currently taking 2100 mg of gabapentin per day without any noticeable improvement but daytime somnolence.  She continues to have nonhealing ulcers and topical pain cream is helping to a limited degree over her leg in areas where there is no skin disruption.    Initial encounter:    Yamel Shah presents to the clinic for the evaluation of foot pain. The pain started 2 years ago following ulcers and symptoms have been worsening.    Brief history: History of scleroderma    Pain Description:    The pain is located in the both feet in the area of slowly healing chronic foot ulcers which were partly from venous insufficiency and stasis associated with her scleroderma.  In her right lower extremity she describes radicular symptoms in the L4/5 distribution.    At BEST  5/10     At WORST  10/10 on the WORST day.      On average pain is rated as 8/10.     Today the pain is rated as 8/10    The pain is described as burning, sharp, shooting and constant      Symptoms interfere with daily activity, sleeping and work.     Exacerbating factors: Laying, Walking, Night Time, Morning and Getting out of bed/chair.      Mitigating factors medications.     Patient denies night fever/night sweats, urinary incontinence, bowel incontinence, significant weight loss, significant motor weakness and loss of sensations.  Patient denies any suicidal or homicidal ideations    Pain Medications:  Current:  Lyrica 300 mg daily  Zanaflex    Tried in  Past:  NSAIDs -Aleve, Mobic, Celebrex  TCA -Never  SNRI -Never  Anti-convulsants - Gabapentin, Lyrica  Muscle Relaxants -Never  Opioids-Tramadol    Physical Therapy/Home Exercise: no       report:  Reviewed and consistent with medication use as prescribed.    Pain Procedures: none    Chiropractor -never  Acupuncture - never  TENS unit -never  Spinal decompression -never  Joint replacement -never    Imaging:   Xray Cervical Spine 12/6/2019:  FINDINGS:  Odontoid prevertebral soft tissues and posterior elements are intact.  Neural foramina are patent.  No fracture dislocation bone destruction seen.  There is mild DJD.     Impression:     Mild DJD.    X-ray lumbar spine 6/1/2016:  2 views: Alignment is normal. There is mild DJD. No fracture dislocation bone destruction seen.   Impression      Mild DJD.     Xray lumbar spine 4/2018:  COMPARISON:  June 2016    FINDINGS:  There is slight curvature of the lumbar spine.  The vertebral bodies are normally aligned and normal in height.  Mild disc space narrowing present at L5-S1.  There is mild facet degenerative change in the lower spine.  No significant osteophytic spurring present.  There is no change in alignment with flexion or extension.      Past Medical History:   Diagnosis Date    Abnormal Pap smear     Acid reflux     Allergy     Arthritis     Encounter for blood transfusion     GERD (gastroesophageal reflux disease)     History of migraine headaches     Hx of colonic polyp     Hypertension     Idiopathic neuropathy 7/20/2012    ILD (interstitial lung disease) 11/6/2013    Iron deficiency anemia 3/18/2014    MRSA carrier     Osteopenia     Pneumonia     Pulmonary fibrosis     Pulmonary hypertension     Raynaud's disease     Scleroderma, diffuse     Sjogren's syndrome     Vitamin D deficiency 11/14/2013     Past Surgical History:   Procedure Laterality Date    24 HOUR IMPEDANCE PH MONITORING OF ESOPHAGUS IN PATIENT NOT TAKING ACID REDUCING  MEDICATIONS N/A 3/4/2020    Procedure: IMPEDANCE PH STUDY, ESOPHAGEAL, 24 HOUR, IN PATIENT NOT TAKING ACID REDUCING MEDICATION;  Surgeon: Annamaria Mendoza MD;  Location: [facility] (4TH FLR);  Service: Endoscopy;  Laterality: N/A;  OFF PPI/H2 Blocker   Motility Studies   Hold Narcotics x 1 days   Hold TCA x 1 days  2/26 - LVM attempting to confirm appt  2/27 - Confirmed appt    ANORECTAL MANOMETRY N/A 5/10/2021    Procedure: MANOMETRY, ANORECTAL with balloon expulsion test;  Surgeon: Annamaria Mendoza MD;  Location: [facility] (4TH FLR);  Service: Endoscopy;  Laterality: N/A;  order combined  covid test 5/7 [employer], instructions emailed-[facility]    BREAST BIOPSY      Left, benign    CERVICAL CONIZATION   W/ LASER  1970    COLONOSCOPY      COLONOSCOPY N/A 3/29/2019    Procedure: COLONOSCOPY;  Surgeon: Annamaria Mendoza MD;  Location: [facility] (2ND FLR);  Service: Endoscopy;  Laterality: N/A;    COLONOSCOPY N/A 5/10/2021    Procedure: COLONOSCOPY;  Surgeon: Annamaria Mendoza MD;  Location: [facility] (4TH FLR);  Service: Endoscopy;  Laterality: N/A;  2nd floor-previous scopes done on 2nd floor, gastroparesis  full liquid diet x2 days, clear liquid x1 day prior to procedure  covid test 5/7 [employer], instructions emailed-[facility]  5/6 pt confirmed appt-[facility]    DILATION AND CURETTAGE OF UTERUS      ESOPHAGEAL MANOMETRY WITH MEASUREMENT OF IMPEDANCE N/A 3/4/2020    Procedure: MANOMETRY, ESOPHAGUS, WITH IMPEDANCE MEASUREMENT;  Surgeon: Annamaria Mendoza MD;  Location: [facility] (4TH FLR);  Service: Endoscopy;  Laterality: N/A;  OFF PPI/H2 Blocker   Motility Studies   Hold Narcotics x 1 days   Hold TCA x 1 days    ESOPHAGOGASTRODUODENOSCOPY      ESOPHAGOGASTRODUODENOSCOPY N/A 3/29/2019    Procedure: EGD (ESOPHAGOGASTRODUODENOSCOPY);  Surgeon: Annamaria Mendoza MD;  Location: [facility] (2ND FLR);  Service: Endoscopy;  Laterality: N/A;  pulmonary htn    ESOPHAGOGASTRODUODENOSCOPY N/A 2/2/2021    Procedure:  ESOPHAGOGASTRODUODENOSCOPY (EGD);  Surgeon: Annamaria Mendoza MD;  [facility]  Service: Endoscopy;  Laterality: N/A;  2nd floor gastroparesis  3 days full liquid diet and 1 day clears  covid test 1/30 primary care, instructions sent to [facility]    ESOPHAGOGASTRODUODENOSCOPY N/A 7/1/2022    Procedure: ESOPHAGOGASTRODUODENOSCOPY (EGD);  Surgeon: Annamaria Mendoza MD;  Location: [address]);  Service: Endoscopy;  Laterality: N/A;  2nd floor-gastroparesis  full liquid diet x3 days, clear liquid diet x1 day prior to procedure  fully vaccinated, instructions sent to [facility]  6/29-pt confirmed new arrival time-[facility]    HYSTERECTOMY  1990    FRANDY (AUB, Fibroids), ovaries remain    RIGHT HEART CATHETERIZATION Right 1/5/2021    Procedure: INSERTION, CATHETER, RIGHT HEART;  Surgeon: Aureliano Sy MD;  Location: [facility] CATH LAB;  Service: Cardiology;  Laterality: Right;    VARICOSE VEIN SURGERY       Social History     Socioeconomic History    Marital status:      Spouse name: Lewis    Number of children: 4   Occupational History    Occupation: -retired     Employer: [employer]     Comment: [employer] [address] [employer]     Employer: RETIRED   Tobacco Use    Smoking status: Never Smoker    Smokeless tobacco: Never Used   Substance and Sexual Activity    Alcohol use: Yes     Alcohol/week: 0.0 standard drinks     Comment: occasionally    Drug use: No    Sexual activity: Yes     Partners: Male     Birth control/protection: None   Other Topics Concern    Are you pregnant or think you may be? No    Breast-feeding No   Social History Narrative         Family History   Problem Relation Age of Onset    Breast cancer Mother     Hypertension Father     No Known Problems Daughter     No Known Problems Son     No Known Problems Daughter     No Known Problems Son     Breast cancer Sister     Diabetes Sister     Breast cancer Maternal Aunt     Osteoarthritis  Brother     Diabetes Brother     Melanoma Neg Hx     Colon cancer Neg Hx     Crohn's disease Neg Hx     Stomach cancer Neg Hx     Ulcerative colitis Neg Hx     Rectal cancer Neg Hx     Irritable bowel syndrome Neg Hx     Esophageal cancer Neg Hx     Celiac disease Neg Hx     Ovarian cancer Neg Hx     Liver cancer Neg Hx     Pancreatic cancer Neg Hx        Review of patient's allergies indicates:   Allergen Reactions    Sulfa (sulfonamide antibiotics)      Other reaction(s): Rash       Current Outpatient Medications   Medication Sig    albuterol (VENTOLIN HFA) 90 mcg/actuation inhaler Inhale 2 puffs into the lungs every 4 (four) hours as needed for Wheezing. Rescue    cadexomer iodine (IODOSORB) 0.9 % gel Apply topically daily as needed for Wound Care. (Patient not taking: No sig reported)    carboxymethylcellulose sodium (REFRESH TEARS) 0.5 % Drop Apply 1-2 drops to every as needed    ergocalciferol (ERGOCALCIFEROL) 50,000 unit Cap Take 1 capsule (50,000 Units total) by mouth every 7 days.    ferrous sulfate 325 (65 FE) MG EC tablet Take 325 mg by mouth 2 (two) times daily.    loratadine (CLARITIN) 10 mg tablet Take 1 tablet (10 mg total) by mouth once daily.    multivitamin capsule Take 1 capsule by mouth once daily.    mycophenolate mofetil (CELLCEPT) 200 mg/mL SusR Take 5 mLs (1,000 mg total) by mouth 2 (two) times daily.    nabumetone (RELAFEN) 750 MG tablet Take 1 tablet (750 mg total) by mouth 2 (two) times daily as needed for Pain.    NIFEdipine (ADALAT CC) 90 MG TbSR TAKE 1 TABLET(90 MG) BY MOUTH EVERY DAY    NIFEdipine (PROCARDIA-XL) 30 MG (OSM) 24 hr tablet Take 1 tablet (30 mg total) by mouth once daily.    pravastatin (PRAVACHOL) 20 MG tablet TAKE 1 TABLET(20 MG) BY MOUTH EVERY EVENING    pregabalin (LYRICA) 300 MG Cap Take 1 capsule (300 mg total) by mouth 2 (two) times daily.    PREVIDENT 5000 BOOSTER PLUS 1.1 % Pste SMARTSIG:To Teeth    PREVIDENT 5000 SENSITIVE 1.1-5 %  Pste BRUSH FOR 2 MINUTES TWICE PER DAY    RABEprazole (ACIPHEX) 20 mg tablet Take 1 tablet (20 mg total) by mouth 2 (two) times daily.    sars-cov-2, covid-19, (MODERNA COVID-19) 50 mcg/0.25 ml injection (BOOSTER) Inject into the muscle. (Patient not taking: Reported on 5/3/2022)    sodium chloride 0.9% 0.9 % irrigation Irrigate with 2 mLs as directed daily as needed (wound cleaning).    tadalafil (ADCIRCA) 20 mg Tab Take 2 tablets (40 mg total) by mouth once daily.    tiZANidine (ZANAFLEX) 4 MG tablet Take 1 tablet (4 mg total) by mouth nightly as needed (muscle pain).     Current Facility-Administered Medications   Medication    acetaminophen tablet 650 mg    albuterol inhaler 2 puff    diphenhydrAMINE injection 25 mg    EPINEPHrine (EPIPEN) 0.3 mg/0.3 mL pen injection 0.3 mg    methylPREDNISolone sodium succinate injection 40 mg    ondansetron disintegrating tablet 4 mg    sodium chloride 0.9% 500 mL flush bag    sodium chloride 0.9% flush 10 mL       REVIEW OF SYSTEMS:    GENERAL:  No weight loss, malaise or fevers.  HEENT:   No recent changes in vision or hearing  NECK:  Negative for lumps,  difficulty with swallowing associated with scleroderma.  RESPIRATORY:  Negative for cough, wheezing or shortness of breath, patient denies any recent URI. Albuterol (history of ILD)  CARDIOVASCULAR:  Negative for chest pain, leg swelling or palpitations.  GI:  Negative for abdominal discomfort, blood in stools or black stools or change in bowel habits. GERD controlled zantac  MUSCULOSKELETAL:  See HPI.  SKIN:   Chronic low healing venous stasis ulcerations to bilateral lower extremities   PSYCH:  No mood disorder or recent psychosocial stressors.  Patients sleep is not disturbed secondary to pain.  HEMATOLOGY/LYMPHOLOGY:   History of iron deficiency anemia, takes daily iron supplementation.    ENDO: No history of diabetes or thyroid dysfunction  NEURO:   No history of headaches, syncope, paralysis, seizures or  "tremors.  All other reviewed and negative other than HPI.    OBJECTIVE:    /67 (BP Location: Right arm, Patient Position: Sitting, BP Method: Medium (Automatic))   Pulse 71   Resp 18   Ht 5' 5" (1.651 m)   Wt 67 kg (147 lb 11.3 oz)   BMI 24.58 kg/m²     PHYSICAL EXAMINATION:    GENERAL: Well appearing, in no acute distress, alert and oriented x3.  PSYCH:  Mood and affect appropriate.  SKIN: Tight skin associated with scleroderma. Wearing shoes today.   HEAD/FACE:  Normocephalic, atraumatic. Cranial nerves grossly intact.  NECK: There is no pain with palpation over trapezius muscle bilaterally. There is no pain with palpation over cervical facet joints. Limited ROM with mild pain on extension and lateral rotation.    CV: RRR with palpation of the radial artery.  PULM: No evidence of respiratory difficulty, symmetric chest rise.  EXTREMITIES: Contractures over on bilateral hands with ulcerations to knuckles, right greater than left.   MUSCULOSKELETAL:   Bilateral lower extremity strength testing limited secondary to pain in the feet.    NEURO: Bilateral lower extremity coordination and muscle stretch reflexes are physiologic and symmetric. Decreased sensation to BLE. Plantar response are downgoing. No clonus.  No loss of sensation is noted.  GAIT: Antalgic, ambulates without assistance       ASSESSMENT: 71 y.o. year old female with pain, consistent with     Encounter Diagnoses   Name Primary?    Chronic pain disorder Yes    Cutaneous scleroderma     Idiopathic neuropathy     Myofascial pain     Cervical spondylosis     DDD (degenerative disc disease), cervical     Encounter for monitoring opioid maintenance therapy        PLAN:     - Previous imaging was reviewed and discussed with the patient today.    - Consider cervical facet joint injections in the future. She declines at this time.     - Continue Lyrica 300 mg BID. Refill provided.     - Continue Relafen. Refill provided.     - Continue " Zanaflex, refill provided.     - Pain Contract signed today.     - Continue Tylenol #3 once daily PRN. Refill provided.     - The patient is here today for a refill of current pain medications and they believe these provide effective pain control and improvements in quality of life.  The patient notes no serious side effects, and feels the benefits outweigh the risks.  The patient was reminded of the pain contract that they signed previously as well as the risks and benefits of the medication including possible death.  The updated [place] Board  Pharmacy prescription monitoring program was reviewed, and the patient has been found to be compliant with current treatment plan.    - UDS done today.      - Continue home exercise routine.     - RTC in 3 months or sooner if needed. She may call for refills.     - Dr. Hinson was consulted on the patient and agrees with this plan.    The above plan and management options were discussed at length with patient. Patient is in agreement with the above and verbalized understanding.     Viktoriya Camara NP  08/26/2022

## 2022-08-29 ENCOUNTER — TELEPHONE (OUTPATIENT)
Dept: ADMINISTRATIVE | Facility: CLINIC | Age: 72
End: 2022-08-29
Payer: MEDICARE

## 2022-08-29 ENCOUNTER — OFFICE VISIT (OUTPATIENT)
Dept: HOME HEALTH SERVICES | Facility: CLINIC | Age: 72
End: 2022-08-29
Payer: MEDICARE

## 2022-08-29 DIAGNOSIS — D84.821 IMMUNOSUPPRESSION DUE TO DRUG THERAPY: ICD-10-CM

## 2022-08-29 DIAGNOSIS — R32 URINARY INCONTINENCE, UNSPECIFIED TYPE: ICD-10-CM

## 2022-08-29 DIAGNOSIS — I27.20 PULMONARY HYPERTENSION: ICD-10-CM

## 2022-08-29 DIAGNOSIS — G89.4 CHRONIC PAIN DISORDER: ICD-10-CM

## 2022-08-29 DIAGNOSIS — M34.89 CUTANEOUS SCLERODERMA: ICD-10-CM

## 2022-08-29 DIAGNOSIS — L97.519 SKIN ULCERS OF BOTH FEET: ICD-10-CM

## 2022-08-29 DIAGNOSIS — M85.89 OSTEOPENIA OF MULTIPLE SITES: ICD-10-CM

## 2022-08-29 DIAGNOSIS — L97.529 SKIN ULCERS OF BOTH FEET: ICD-10-CM

## 2022-08-29 DIAGNOSIS — Z00.00 ENCOUNTER FOR PREVENTIVE HEALTH EXAMINATION: Primary | ICD-10-CM

## 2022-08-29 DIAGNOSIS — Z79.899 IMMUNOSUPPRESSION DUE TO DRUG THERAPY: ICD-10-CM

## 2022-08-29 DIAGNOSIS — M47.812 CERVICAL SPONDYLOSIS: ICD-10-CM

## 2022-08-29 DIAGNOSIS — R15.9 INCONTINENCE OF FECES, UNSPECIFIED FECAL INCONTINENCE TYPE: ICD-10-CM

## 2022-08-29 DIAGNOSIS — I70.0 CALCIFICATION OF AORTA: ICD-10-CM

## 2022-08-29 DIAGNOSIS — I87.2 VENOUS INSUFFICIENCY OF BOTH LOWER EXTREMITIES: ICD-10-CM

## 2022-08-29 DIAGNOSIS — I10 ESSENTIAL HYPERTENSION: ICD-10-CM

## 2022-08-29 DIAGNOSIS — G60.9 IDIOPATHIC NEUROPATHY: ICD-10-CM

## 2022-08-29 DIAGNOSIS — J84.9 ILD (INTERSTITIAL LUNG DISEASE): ICD-10-CM

## 2022-08-29 DIAGNOSIS — I77.1 ARTERIAL INSUFFICIENCY WITH ISCHEMIC ULCER: ICD-10-CM

## 2022-08-29 DIAGNOSIS — L98.499 ARTERIAL INSUFFICIENCY WITH ISCHEMIC ULCER: ICD-10-CM

## 2022-08-29 DIAGNOSIS — K21.9 GASTROESOPHAGEAL REFLUX DISEASE, UNSPECIFIED WHETHER ESOPHAGITIS PRESENT: ICD-10-CM

## 2022-08-29 PROCEDURE — G0439 PPPS, SUBSEQ VISIT: HCPCS | Mod: 95,NTX,, | Performed by: NURSE PRACTITIONER

## 2022-08-29 PROCEDURE — G0439 PR MEDICARE ANNUAL WELLNESS SUBSEQUENT VISIT: ICD-10-PCS | Mod: 95,NTX,, | Performed by: NURSE PRACTITIONER

## 2022-08-29 NOTE — TELEPHONE ENCOUNTER
Specialty Pharmacy - Refill Coordination    Specialty Medication Orders Linked to Encounter      Flowsheet Row Most Recent Value   Medication #1 mycophenolate mofetil (CELLCEPT) 200 mg/mL SusR (Order#[id number], Rx#[id number])            Refill Questions - Documented Responses      Flowsheet Row Most Recent Value   Patient Availability and HIPAA Verification    Does patient want to proceed with activity? Yes   HIPAA/medical authority confirmed? Yes   Relationship to patient of person spoken to? Self   Refill Screening Questions    Would patient like to speak to a pharmacist? No   When does the patient need to receive the medication? 08/30/22   Refill Delivery Questions    How will the patient receive the medication? Delivery Subha   When does the patient need to receive the medication? 08/30/22   Shipping Address Home   Address in [facility] confirmed and updated if neccessary? Yes   Expected Copay ($) 15   Is the patient able to afford the medication copay? Yes   Payment Method CC on file   Days supply of Refill 48   Supplies needed? No supplies needed   Refill activity completed? Yes   Refill activity plan Refill scheduled   Shipment/Pickup Date: 08/29/22            Current Outpatient Medications   Medication Sig    acetaminophen-codeine 300-30mg (TYLENOL #3) 300-30 mg Tab Take 1 tablet by mouth daily as needed (pain).    albuterol (VENTOLIN HFA) 90 mcg/actuation inhaler Inhale 2 puffs into the lungs every 4 (four) hours as needed for Wheezing. Rescue    cadexomer iodine (IODOSORB) 0.9 % gel Apply topically daily as needed for Wound Care. (Patient not taking: No sig reported)    carboxymethylcellulose sodium (REFRESH TEARS) 0.5 % Drop Apply 1-2 drops to every as needed    ergocalciferol (ERGOCALCIFEROL) 50,000 unit Cap Take 1 capsule (50,000 Units total) by mouth every 7 days.    ferrous sulfate 325 (65 FE) MG EC tablet Take 325 mg by mouth 2 (two) times daily.    loratadine (CLARITIN) 10 mg tablet Take 1  tablet (10 mg total) by mouth once daily.    multivitamin capsule Take 1 capsule by mouth once daily.    mycophenolate mofetil (CELLCEPT) 200 mg/mL SusR Take 5 mLs (1,000 mg total) by mouth 2 (two) times daily.    nabumetone (RELAFEN) 750 MG tablet Take 1 tablet (750 mg total) by mouth 2 (two) times daily as needed for Pain.    NIFEdipine (ADALAT CC) 90 MG TbSR TAKE 1 TABLET(90 MG) BY MOUTH EVERY DAY    NIFEdipine (PROCARDIA-XL) 30 MG (OSM) 24 hr tablet Take 1 tablet (30 mg total) by mouth once daily.    pravastatin (PRAVACHOL) 20 MG tablet TAKE 1 TABLET(20 MG) BY MOUTH EVERY EVENING    pregabalin (LYRICA) 300 MG Cap Take 1 capsule (300 mg total) by mouth 2 (two) times daily.    PREVIDENT 5000 BOOSTER PLUS 1.1 % Pste SMARTSIG:To Teeth    PREVIDENT 5000 SENSITIVE 1.1-5 % Pste BRUSH FOR 2 MINUTES TWICE PER DAY    RABEprazole (ACIPHEX) 20 mg tablet Take 1 tablet (20 mg total) by mouth 2 (two) times daily.    sars-cov-2, covid-19, (MODERNA COVID-19) 50 mcg/0.25 ml injection (BOOSTER) Inject into the muscle.    sodium chloride 0.9% 0.9 % irrigation Irrigate with 2 mLs as directed daily as needed (wound cleaning). (Patient not taking: Reported on 8/29/2022)    tadalafil (ADCIRCA) 20 mg Tab Take 2 tablets (40 mg total) by mouth once daily.    tiZANidine (ZANAFLEX) 4 MG tablet Take 1 tablet (4 mg total) by mouth nightly as needed (muscle pain).   Last reviewed on 8/29/2022  1:10 PM by Romelia Christianson, SEAN    Review of patient's allergies indicates:   Allergen Reactions    Sulfa (sulfonamide antibiotics)      Other reaction(s): Rash    Tramadol Itching    Last reviewed on  8/29/2022 1:06 PM by Romelia Christianson      Tasks added this encounter   No tasks added.   Tasks due within next 3 months   No tasks due.     Harley Mitchell, PharmD  [facility]  [address] [facility] [address] [facility] [address] [facility] [address]  Phone: [phone]  Fax: [phone]

## 2022-08-29 NOTE — TELEPHONE ENCOUNTER
New script received for refill. No PA required.     Outgoing call to pt to set up refill. No answer, LVM.

## 2022-08-29 NOTE — PROGRESS NOTES
"The patient location is: [place]  The chief complaint leading to consultation is: Health Risk Assessment    Visit type: audiovisual    Face to Face time with patient: 25  40 minutes of total time spent on the encounter, which includes face to face time and non-face to face time preparing to see the patient (eg, review of tests), Obtaining and/or reviewing separately obtained history, Documenting clinical information in the electronic or other health record, Independently interpreting results (not separately reported) and communicating results to the patient/family/caregiver, or Care coordination (not separately reported).         Each patient to whom he or she provides medical services by telemedicine is:  (1) informed of the relationship between the physician and patient and the respective role of any other health care provider with respect to management of the patient; and (2) notified that he or she may decline to receive medical services by telemedicine and may withdraw from such care at any time.    Notes:       Yamel Shah presented for a  Medicare AWV and comprehensive Health Risk Assessment today. The following components were reviewed and updated:    Medical history  Family History  Social history  Allergies and Current Medications  Health Risk Assessment  Health Maintenance  Care Team         ** See Completed Assessments for Annual Wellness Visit within the encounter summary.**         The following assessments were completed:  Living Situation  CAGE  Depression Screening  Fall Risk Assessment (MACH 10)  Hearing Assessment(HHI)  Cognitive Function Screening  Nutrition Screening  ADL Screening  PAQ Screening      Vitals:    08/29/22 1306   Weight: 66.2 kg (146 lb)   Height: 5' 5" (1.651 m)     Body mass index is 24.3 kg/m².  Physical Exam  Constitutional:       Appearance: Normal appearance.   Neurological:      General: No focal deficit present.      Mental Status: She is alert and oriented to " person, place, and time.             Diagnoses and health risks identified today and associated recommendations/orders:    1. Encounter for preventive health examination  Assessments completed. Preventive measures and health maintenance reviewed with patient.    2. Immunosuppression due to drug therapy  Stable, patient on Cellcept. Followed by Rheumatology.    3. ILD (interstitial lung disease)  Stable, patient has Albuterol inhaler for use as needed. Followed by PCP.    4. Calcification of aorta  Stable, patient on Pravastatin. Followed by PCP.    5. Pulmonary hypertension  Stable, patient on Tadalafil. Followed by Transplant.     6. Cutaneous scleroderma  Stable, patient on Cellcept. Followed by Rheumatology.    7. Arterial insufficiency with ischemic ulcer  Stable, followed by Podiatry and Vascular Surgery.    8. Venous insufficiency of both lower extremities  Stable, followed by Vascular Surgery and PCP.    9. Skin ulcers of both feet  Stable, followed by Podiatry and Vascular Surgery.    10. Chronic pain disorder  Stable, patient on Zanaflex and Tylenol #3. Followed by Pain Management.    11. Cervical spondylosis  Stable, patient on Zanaflex and Tylenol #3. Followed by Pain Management.    12. Idiopathic neuropathy  Stable, patient on Lyrica. Followed by PCP.    13. Essential hypertension  Stable, patient on Nifedipine. Followed by PCP.    14. Urinary incontinence, unspecified type  Stable, followed by Gastroenterology.    15. Incontinence of feces, unspecified fecal incontinence type  Stable, followed by Gastroenterology.    16. Osteopenia of multiple sites  Stable, followed by PCP.    17. Gastroesophageal reflux disease, unspecified whether esophagitis present  Stable, patient on Aciphex. Followed by PCP.      Provided Yamel with a 5-10 year written screening schedule and personal prevention plan. Recommendations were developed using the USPSTF age appropriate recommendations. Education, counseling, and  referrals were provided as needed. After Visit Summary printed and given to patient which includes a list of additional screenings\tests needed.    Follow up in about 1 year (around 8/29/2023) for your next annual wellness visit.    Romelia Christianson NP  I offered to discuss advanced care planning, including how to pick a person who would make decisions for you if you were unable to make them for yourself, called a health care power of , and what kind of decisions you might make such as use of life sustaining treatments such as ventilators and tube feeding when faced with a life limiting illness recorded on a living will that they will need to know. (How you want to be cared for as you near the end of your natural life)     X Patient is interested in learning more about how to make advanced directives.  I provided them paperwork and offered to discuss this with them.

## 2022-08-29 NOTE — PATIENT INSTRUCTIONS
Counseling and Referral of Other Preventative  (Italic type indicates deductible and co-insurance are waived)    Patient Name: Yamel Shah  Today's Date: 8/29/2022    Health Maintenance       Date Due Completion Date    Sign Pain Contract Never done ---    Complete Opioid Risk Tool Never done ---    COVID-19 Vaccine (3 - Moderna risk series) 05/19/2022 4/21/2022    DEXA Scan 09/08/2022 9/8/2020    Influenza Vaccine (1) 09/01/2022 9/21/2021    Mammogram 05/31/2023 5/31/2022    Colorectal Cancer Screening 05/10/2024 5/10/2021    Lipid Panel 05/10/2024 5/10/2019    TETANUS VACCINE 02/06/2029 2/6/2019        No orders of the defined types were placed in this encounter.    The following information is provided to all patients.  This information is to help you find resources for any of the problems found today that may be affecting your health:                Living healthy guide: www.[facility].[place].[facility]      Understanding Diabetes: www.diabetes.org      Eating healthy: www.cdc.gov/healthyweight      CDC home safety checklist: www.cdc.gov/steadi/patient.html      Agency on Aging: www.goea.[place].gov      Alcoholics anonymous (AA): www.aa.org      Physical Activity: www.damon.nih.gov/vg2sbaf      Tobacco use: www.quitwithusla.org

## 2022-08-30 VITALS — HEIGHT: 65 IN | BODY MASS INDEX: 24.32 KG/M2 | WEIGHT: 146 LBS

## 2022-08-30 PROBLEM — Z79.899 IMMUNOSUPPRESSION DUE TO DRUG THERAPY: Status: ACTIVE | Noted: 2022-08-30

## 2022-08-30 PROBLEM — I70.0 CALCIFICATION OF AORTA: Status: ACTIVE | Noted: 2022-08-30

## 2022-08-30 PROBLEM — D84.821 IMMUNOSUPPRESSION DUE TO DRUG THERAPY: Status: ACTIVE | Noted: 2022-08-30

## 2022-09-01 LAB
6MAM UR QL: NOT DETECTED
7AMINOCLONAZEPAM UR QL: NOT DETECTED
A-OH ALPRAZ UR QL: NOT DETECTED
ALPHA-OH-MIDAZOLAM: NOT DETECTED
ALPRAZ UR QL: NOT DETECTED
AMPHET UR QL SCN: NOT DETECTED
ANNOTATION COMMENT IMP: NORMAL
ANNOTATION COMMENT IMP: NORMAL
BARBITURATES UR QL: NOT DETECTED
BUPRENORPHINE UR QL: NOT DETECTED
BZE UR QL: NOT DETECTED
CARBOXYTHC UR QL: NOT DETECTED
CARISOPRODOL UR QL: NOT DETECTED
CLONAZEPAM UR QL: NOT DETECTED
CODEINE UR QL: NOT DETECTED
CREAT UR-MCNC: 344.5 MG/DL (ref 20–400)
DIAZEPAM UR QL: NOT DETECTED
ETHYL GLUCURONIDE UR QL: PRESENT
FENTANYL UR QL: NOT DETECTED
GABAPENTIN: NOT DETECTED
HYDROCODONE UR QL: NOT DETECTED
HYDROMORPHONE UR QL: NOT DETECTED
LORAZEPAM UR QL: NOT DETECTED
MDA UR QL: NOT DETECTED
MDEA UR QL: NOT DETECTED
MDMA UR QL: NOT DETECTED
ME-PHENIDATE UR QL: NOT DETECTED
METHADONE UR QL: NOT DETECTED
METHAMPHET UR QL: NOT DETECTED
MIDAZOLAM UR QL SCN: NOT DETECTED
MORPHINE UR QL: NOT DETECTED
NALOXONE: NOT DETECTED
NORBUPRENORPHINE UR QL CFM: NOT DETECTED
NORDIAZEPAM UR QL: NOT DETECTED
NORFENTANYL UR QL: NOT DETECTED
NORHYDROCODONE UR QL CFM: NOT DETECTED
NORMEPERIDINE UR QL CFM: NOT DETECTED
NOROXYCODONE UR QL CFM: NOT DETECTED
NOROXYMORPHONE UR QL SCN: NOT DETECTED
OXAZEPAM UR QL: NOT DETECTED
OXYCODONE UR QL: NOT DETECTED
OXYMORPHONE UR QL: NOT DETECTED
PATHOLOGY STUDY: NORMAL
PCP UR QL: NOT DETECTED
PHENTERMINE UR QL: NOT DETECTED
PREGABALIN: PRESENT
SERVICE CMNT-IMP: NORMAL
TAPENTADOL UR QL SCN: NOT DETECTED
TAPENTADOL UR QL SCN: NOT DETECTED
TEMAZEPAM UR QL: NOT DETECTED
TRAMADOL UR QL: NOT DETECTED
ZOLPIDEM METABOLITE: NOT DETECTED
ZOLPIDEM UR QL: NOT DETECTED

## 2022-09-09 ENCOUNTER — LAB VISIT (OUTPATIENT)
Dept: LAB | Facility: HOSPITAL | Age: 72
End: 2022-09-09
Attending: INTERNAL MEDICINE
Payer: MEDICARE

## 2022-09-09 DIAGNOSIS — M34.9 SCLERODERMA: ICD-10-CM

## 2022-09-09 LAB
ALBUMIN SERPL BCP-MCNC: 3.5 G/DL (ref 3.5–5.2)
ALP SERPL-CCNC: 120 U/L (ref 55–135)
ALT SERPL W/O P-5'-P-CCNC: 13 U/L (ref 10–44)
ANION GAP SERPL CALC-SCNC: 6 MMOL/L (ref 8–16)
AST SERPL-CCNC: 21 U/L (ref 10–40)
BASOPHILS # BLD AUTO: 0.02 K/UL (ref 0–0.2)
BASOPHILS NFR BLD: 0.5 % (ref 0–1.9)
BILIRUB SERPL-MCNC: 0.3 MG/DL (ref 0.1–1)
BUN SERPL-MCNC: 15 MG/DL (ref 8–23)
CALCIUM SERPL-MCNC: 8.8 MG/DL (ref 8.7–10.5)
CHLORIDE SERPL-SCNC: 109 MMOL/L (ref 95–110)
CO2 SERPL-SCNC: 24 MMOL/L (ref 23–29)
CREAT SERPL-MCNC: 0.7 MG/DL (ref 0.5–1.4)
CRP SERPL-MCNC: 12.2 MG/L (ref 0–8.2)
DIFFERENTIAL METHOD: ABNORMAL
EOSINOPHIL # BLD AUTO: 0.1 K/UL (ref 0–0.5)
EOSINOPHIL NFR BLD: 2.4 % (ref 0–8)
ERYTHROCYTE [DISTWIDTH] IN BLOOD BY AUTOMATED COUNT: 15.4 % (ref 11.5–14.5)
ERYTHROCYTE [SEDIMENTATION RATE] IN BLOOD BY PHOTOMETRIC METHOD: 51 MM/HR (ref 0–36)
EST. GFR  (NO RACE VARIABLE): >60 ML/MIN/1.73 M^2
GLUCOSE SERPL-MCNC: 99 MG/DL (ref 70–110)
HCT VFR BLD AUTO: 36 % (ref 37–48.5)
HGB BLD-MCNC: 12.2 G/DL (ref 12–16)
IMM GRANULOCYTES # BLD AUTO: 0.01 K/UL (ref 0–0.04)
IMM GRANULOCYTES NFR BLD AUTO: 0.3 % (ref 0–0.5)
LYMPHOCYTES # BLD AUTO: 1.1 K/UL (ref 1–4.8)
LYMPHOCYTES NFR BLD: 30.1 % (ref 18–48)
MCH RBC QN AUTO: 30.3 PG (ref 27–31)
MCHC RBC AUTO-ENTMCNC: 33.9 G/DL (ref 32–36)
MCV RBC AUTO: 89 FL (ref 82–98)
MONOCYTES # BLD AUTO: 0.3 K/UL (ref 0.3–1)
MONOCYTES NFR BLD: 7.1 % (ref 4–15)
NEUTROPHILS # BLD AUTO: 2.3 K/UL (ref 1.8–7.7)
NEUTROPHILS NFR BLD: 59.6 % (ref 38–73)
NRBC BLD-RTO: 0 /100 WBC
PLATELET # BLD AUTO: 202 K/UL (ref 150–450)
PMV BLD AUTO: 11.1 FL (ref 9.2–12.9)
POTASSIUM SERPL-SCNC: 4.1 MMOL/L (ref 3.5–5.1)
PROT SERPL-MCNC: 8 G/DL (ref 6–8.4)
RBC # BLD AUTO: 4.03 M/UL (ref 4–5.4)
SODIUM SERPL-SCNC: 139 MMOL/L (ref 136–145)
WBC # BLD AUTO: 3.79 K/UL (ref 3.9–12.7)

## 2022-09-09 PROCEDURE — 80053 COMPREHEN METABOLIC PANEL: CPT | Mod: NTX | Performed by: INTERNAL MEDICINE

## 2022-09-09 PROCEDURE — 86140 C-REACTIVE PROTEIN: CPT | Mod: NTX | Performed by: INTERNAL MEDICINE

## 2022-09-09 PROCEDURE — 36415 COLL VENOUS BLD VENIPUNCTURE: CPT | Mod: NTX | Performed by: INTERNAL MEDICINE

## 2022-09-09 PROCEDURE — 85652 RBC SED RATE AUTOMATED: CPT | Mod: TXP | Performed by: INTERNAL MEDICINE

## 2022-09-09 PROCEDURE — 85025 COMPLETE CBC W/AUTO DIFF WBC: CPT | Mod: NTX | Performed by: INTERNAL MEDICINE

## 2022-09-12 ENCOUNTER — OFFICE VISIT (OUTPATIENT)
Dept: RHEUMATOLOGY | Facility: CLINIC | Age: 72
End: 2022-09-12
Payer: MEDICARE

## 2022-09-12 VITALS
SYSTOLIC BLOOD PRESSURE: 115 MMHG | DIASTOLIC BLOOD PRESSURE: 59 MMHG | WEIGHT: 155.88 LBS | BODY MASS INDEX: 25.97 KG/M2 | HEART RATE: 79 BPM | HEIGHT: 65 IN

## 2022-09-12 DIAGNOSIS — I27.20 PULMONARY HYPERTENSION: ICD-10-CM

## 2022-09-12 DIAGNOSIS — M85.80 OSTEOPENIA, UNSPECIFIED LOCATION: ICD-10-CM

## 2022-09-12 DIAGNOSIS — J84.9 ILD (INTERSTITIAL LUNG DISEASE): ICD-10-CM

## 2022-09-12 DIAGNOSIS — M34.9 SCLERODERMA: Primary | ICD-10-CM

## 2022-09-12 DIAGNOSIS — D64.9 ANEMIA, UNSPECIFIED TYPE: ICD-10-CM

## 2022-09-12 PROCEDURE — 99214 PR OFFICE/OUTPT VISIT, EST, LEVL IV, 30-39 MIN: ICD-10-PCS | Mod: S$PBB,GC,NTX, | Performed by: STUDENT IN AN ORGANIZED HEALTH CARE EDUCATION/TRAINING PROGRAM

## 2022-09-12 PROCEDURE — 99999 PR PBB SHADOW E&M-EST. PATIENT-LVL III: ICD-10-PCS | Mod: PBBFAC,GC,TXP, | Performed by: STUDENT IN AN ORGANIZED HEALTH CARE EDUCATION/TRAINING PROGRAM

## 2022-09-12 PROCEDURE — 99999 PR PBB SHADOW E&M-EST. PATIENT-LVL III: CPT | Mod: PBBFAC,GC,TXP, | Performed by: STUDENT IN AN ORGANIZED HEALTH CARE EDUCATION/TRAINING PROGRAM

## 2022-09-12 PROCEDURE — 99213 OFFICE O/P EST LOW 20 MIN: CPT | Mod: PBBFAC,NTX | Performed by: STUDENT IN AN ORGANIZED HEALTH CARE EDUCATION/TRAINING PROGRAM

## 2022-09-12 PROCEDURE — 99214 OFFICE O/P EST MOD 30 MIN: CPT | Mod: S$PBB,GC,NTX, | Performed by: STUDENT IN AN ORGANIZED HEALTH CARE EDUCATION/TRAINING PROGRAM

## 2022-09-12 ASSESSMENT — ROUTINE ASSESSMENT OF PATIENT INDEX DATA (RAPID3)
MDHAQ FUNCTION SCORE: 1.2
TOTAL RAPID3 SCORE: 5.33
FATIGUE SCORE: 5.5
PSYCHOLOGICAL DISTRESS SCORE: 3.3
PATIENT GLOBAL ASSESSMENT SCORE: 6
AM STIFFNESS SCORE: 0, NO
PAIN SCORE: 6

## 2022-09-12 NOTE — PROGRESS NOTES
Subjective:       Patient ID: Yamel Shah is a 71 y.o. female.    Chief Complaint: Systemic scleroderma disease    HPI     Yamel Shah is a 71 y.o. 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 She was originally diagnosed in 1983 and has been seeing Dr. Ahuja for over 10 years.( +SSA, +SCL-70, -RNAP3, ILD). She has been on MMF since 2019. Used to follow with Dr. Leigh in pulmonology but now follows with Dr. Merlos. Additionally, she has chronic ulcers in her feet and sees podiatry and vascular. She is s/p laser venous ablation on right foot in 12/2020 by Dr. Claudio. She follows with Dr. Martins in Cardiology and had a right heart cath 1/2021 which showed normal PA pressures. She takes tadalafil. Her last echo was in 9/2020 with indeteriminate diasystolic dysfunction and PA 40 mmhg.     Interval History:  9/12/22: she states that she is doing well. She denies fever, nights sweats, joint pain, rash, chest pain, or SOB. She does states that she notices more hot-flashes within the last week but no fever. She is compliant with MMF.    4/11/22: She comes today for follow-up.  She was previously taking CellCept but discontinued this 3 weeks ago due to concern that it was causing her to have frequent chills.  Of note, she denies fever, chest pain, shortness of breath, joint pains, or joint swelling. CBC from 4/7 showed moderate anemia (hgb 12.3-> 10.6), otherwise within normal limits.  She is scheduled to see GI after this appointment today.  She denies any bloody bowel movements or any dark tarry stools.  She also denies abdominal pain.  She is concerned about her nonhealing ulcers but states that these do appear improved from last appointment.  She was seen by Wound care in [place] last year who recommended possible skin grafts but she  declines this at this time.  She is also scheduled to see pulmonology on 04/19/2022.      10/27/21: Doing well today, does report persistent pain in her feet due to her chronic ulcers. She will see podiatry later today and states that she thinks that they are healing slowly. She denies joint pain, joint swelling, oral/nasal ulcers, fevers, or chest pain. She has occasional shortness of breath when she goes up stairs but states that this is stable. She was last seen by Dr. Merlos in pulmonology on 7/2021 where her PFTs were noted to be stable. Prior to this, seen by Dr. Leigh in 5/2020, stated that not a candidate for antifibrotic at that time. She will see podiatry later today for the chronic ulcers on her feet and states that these appear to be healing slowly. She last saw vascular surgery in 12/2020. Her last echo was in 9/2020 with indeteriminate diasystolic dysfunction and PA 40 mmhg. She had a right heart cath 1/2021 which showed normal PA pressures. She denies joint pain, joint swelling, oral/nasal ulcers, fevers, or chest pain.    1/3/21: she reports that she is doing well and that her ulcers are unchanged from prior visit. She was seen by Dr. Claudio in vascular surgery who stated that the ulcers due to venous insufficiency and not arterial, requiring podiatry and wound care. ABIs were normal from 12/2021. Last echo in 12/29/21 showed normal EF with PA pressure 32 hgmm (prior in 2020 was 40 hgmm). She denies increased SOB or chest pain. Got PneumoVax 23 in 11/2021. She has not had an issue with Raynaud's this season. She continues on nifedipine and tadalafil. Reports receiving COVID booster since last appointment but did not hold MMF for a week after this.    Treatment Regimen:   She is currently on MMF 1000mg BID (liquid suspension) (she reports that she has held this for the past 3 weeks as of 4/11/22)    Review of Systems   Constitutional:  Negative for fever and unexpected weight change.   HENT:  Positive  "for trouble swallowing. Negative for mouth sores.    Eyes:  Negative for redness.   Respiratory:  Negative for cough and shortness of breath.    Cardiovascular:  Negative for chest pain.   Gastrointestinal:  Negative for constipation and diarrhea.   Genitourinary:  Negative for dysuria and genital sores.   Musculoskeletal:  Negative for joint swelling.   Skin:  Positive for rash.   Neurological:  Negative for headaches.   Hematological:  Bruises/bleeds easily.       Objective:   BP (!) 115/59   Pulse 79   Ht 5' 5" (1.651 m)   Wt 70.7 kg (155 lb 13.8 oz)   BMI 25.94 kg/m²      Physical Exam   Constitutional: She is oriented to person, place, and time. No distress.   HENT:   Head: Normocephalic and atraumatic.   Right Ear: External ear normal.   Left Ear: External ear normal.   Nose: Nose normal.   Mouth/Throat: Oropharynx is clear and moist.   Eyes: Conjunctivae are normal. Right eye exhibits no discharge. Left eye exhibits no discharge. No scleral icterus.   Neck: No thyromegaly present.   Cardiovascular: Normal rate, regular rhythm and normal heart sounds.   No murmur heard.  Pulmonary/Chest: Effort normal and breath sounds normal. No respiratory distress. She has no wheezes. She exhibits no tenderness.   Crackles b/l stable from prior exam   Abdominal: Soft. Bowel sounds are normal. She exhibits no distension. There is no abdominal tenderness.   Musculoskeletal:         General: Deformity present. No tenderness. Normal range of motion.      Comments: Skin tightness  Claw hand deformity, flexion contractures b/l  Skin score: 33  Chronic LE ulcers.   Neurological: She is alert and oriented to person, place, and time.   Skin: Skin is warm and dry. No rash noted. She is not diaphoretic. No erythema.   Psychiatric: Mood and affect normal.   Vitals reviewed.            Modified Skin Score:  Skin score head= 1  Chest= 1  abdomen=1         Right upper arm=2  Left upper arm=1    Right lower arm=1 Left lower arm: " 1  Right hand=3 Left hand=2  R Fingers= 3        L Fingers=2  Right upper leg= 2  Left upper leg=2  Right lower leg= 1  Left lower leg= 1  Right foot= 3            Left foot= 3    Modified Skin Score: 22        Assessment:       1. Scleroderma    2. ILD (interstitial lung disease)    3. Pulmonary hypertension    4. Osteopenia, unspecified location    5. Anemia, unspecified type        Yamel Shah is a 70 y.o. female with SSc (+SSA, +SCL-70, -RNAP3) with ILD, diastolic dysfunction, and GERD with Pagan's esophagus who presents for follow up for disease management. She was originally diagnosed in 1983 and has been seeing Dr. Ahuja for over 10 years.( +SSA, +SCL-70, -RNAP3, ILD). She has been on MMF since 2019. Used to follow with Dr. Leigh in pulmonology. She follows with Dr. Martins in Cardiology and had a right heart cath 1/2021 which showed normal PA pressures. She takes tadalafil. Her last echo was in 9/2020 with indeteriminate diasystolic dysfunction and PA 40 mmhg. Additionally, she has chronic ulcers in her feet. She is s/p laser venous ablation on right foot in 12/2020 by Dr. Claudio, recently seen in 12/2021 where ABIs were normal without signs of PVD. Will refer back to vascular to see if additional intervention is needed.     Plan:       Problem List Items Addressed This Visit          Pulmonary    Pulmonary hypertension    ILD (interstitial lung disease)       Orthopedic    Osteopenia     Other Visit Diagnoses       Scleroderma    -  Primary    Anemia, unspecified type              - continue MMF 1000mg BID (liquid suspension)  - Cardiac: pHTN secondary to SSc, last echo 12/2021 with hgmm 32, EF 63%, and trivial posterior cardiac effusion   - Pulm: ILD, Last CT chest with stable from 2020. PFT from 1/202022 without DLCO, due to Dr. Leigh in 11/2022; will repeat PFTs & 6MWT today  2018: FVC 1.93/ FEV1 1.52/ FVC: FEV1 79/ DLCO n/a  2019: FVC 1.98/ FEV1 1.59/ FVC: FEV1 80/ DLCO 12.7  2020:  FVC 1.92/ FEV1 1.57/ FVC: FEV1 82/ DLCO 16.03  2022: FVC 1.83/ FEV1 1.44/ FVC: FEV1 78/ DLCO n/a  - Vascular: Chronic bilateral Foot ulcers; seen last by vascular surgery Dr. Claudio in 12/2021 and noted to ulcers are likely due to chronic venous insufficiency; will schedule follow up with Dr. Claudio to see if additional intervention is needed  - encouraged to get flu vaccine today, instructed to hold MMF for 1 week after this  - up to date on covid series and covid booster, can get 3rd COVID vaccine when available    Follow-up in 3 months with labs before visit    Patient seen and evaluated with Dr. Ahuja

## 2022-09-12 NOTE — PROGRESS NOTES
Rapid3 Question Responses and Scores 9/6/2022   MDHAQ Score 1.2   Psychologic Score 3.3   Pain Score 6   When you awakened in the morning OVER THE LAST WEEK, did you feel stiff? No   Fatigue Score 5.5   Global Health Score 6   RAPID3 Score 5.33

## 2022-09-13 ENCOUNTER — IMMUNIZATION (OUTPATIENT)
Dept: PHARMACY | Facility: CLINIC | Age: 72
End: 2022-09-13
Payer: MEDICARE

## 2022-09-19 ENCOUNTER — OFFICE VISIT (OUTPATIENT)
Dept: GASTROENTEROLOGY | Facility: CLINIC | Age: 72
End: 2022-09-19
Payer: MEDICARE

## 2022-09-19 VITALS
HEIGHT: 65 IN | BODY MASS INDEX: 25.47 KG/M2 | HEART RATE: 74 BPM | WEIGHT: 152.88 LBS | SYSTOLIC BLOOD PRESSURE: 109 MMHG | OXYGEN SATURATION: 99 % | DIASTOLIC BLOOD PRESSURE: 62 MMHG

## 2022-09-19 DIAGNOSIS — K59.00 CONSTIPATION, UNSPECIFIED CONSTIPATION TYPE: ICD-10-CM

## 2022-09-19 DIAGNOSIS — R13.10 DYSPHAGIA, UNSPECIFIED TYPE: ICD-10-CM

## 2022-09-19 DIAGNOSIS — R15.9 INCONTINENCE OF FECES, UNSPECIFIED FECAL INCONTINENCE TYPE: ICD-10-CM

## 2022-09-19 DIAGNOSIS — R14.0 BLOATING: ICD-10-CM

## 2022-09-19 DIAGNOSIS — K21.9 GASTROESOPHAGEAL REFLUX DISEASE, UNSPECIFIED WHETHER ESOPHAGITIS PRESENT: Primary | ICD-10-CM

## 2022-09-19 DIAGNOSIS — R68.81 EARLY SATIETY: ICD-10-CM

## 2022-09-19 PROCEDURE — 99999 PR PBB SHADOW E&M-EST. PATIENT-LVL III: ICD-10-PCS | Mod: PBBFAC,TXP,, | Performed by: INTERNAL MEDICINE

## 2022-09-19 PROCEDURE — 99999 PR PBB SHADOW E&M-EST. PATIENT-LVL III: CPT | Mod: PBBFAC,TXP,, | Performed by: INTERNAL MEDICINE

## 2022-09-19 PROCEDURE — 99215 OFFICE O/P EST HI 40 MIN: CPT | Mod: S$PBB,NTX,, | Performed by: INTERNAL MEDICINE

## 2022-09-19 PROCEDURE — 99215 PR OFFICE/OUTPT VISIT, EST, LEVL V, 40-54 MIN: ICD-10-PCS | Mod: S$PBB,NTX,, | Performed by: INTERNAL MEDICINE

## 2022-09-19 PROCEDURE — 99213 OFFICE O/P EST LOW 20 MIN: CPT | Mod: PBBFAC,TXP | Performed by: INTERNAL MEDICINE

## 2022-09-19 NOTE — PATIENT INSTRUCTIONS
-Try gaviscon with alginate at night before going to sleep.  Chew 1-2 tablets after meals and at bedtime as needed (up to 4x a day).  For best results follow by a half glass of water or other liquid.   Gaviscon is available in liquid formulation.  Take 1-4 tablespoons 4x a day for Regular Strength and 1-4 teaspoonfuls 4x a day for Extra Strength.  The special european formulation with alginate appears more effective and is usually available on [employer].  You can try regular Gaviscon if you are unable to find the one with alginate      -Take aciphex 20mg twice per day, 30 minutes before breakfast and dinner  -Eat small, frequent meals. Avoid large or medium sized meals.  -Never lay down after eating. The food sitting in  our esophagus can regurgitate and end up in your lungs.   -Give yourself 3 hours after eating before laying down.   -Be very careful about taking pills. Pills can spend a long time in your esophagus and cause significant damage. Swallow pills with a large amount of water (8-12 oz) and remain upright for 1 hr after taking them. You may also try getting liquid formulations of your medicines if possible. Compounding pharmacies can make almost any medication in liquid form.   Eat sitting completely upright only as gravity can help empty the esophagus.   Swallow only once per bite. Avoid double swallows as taking a second swallow can diminish esophageal motility. Wait 15 seconds after every swallow before initiating a second swallow.   Take iron (ferrous sulfate) 325 (65 Fe) MG daily.   -See Dr. Segura to discuss surgical treatment for acid reflux

## 2022-09-19 NOTE — PROGRESS NOTES
[facility] Visit    Reason for Visit:  The primary encounter diagnosis was Gastroesophageal reflux disease, unspecified whether esophagitis present. Diagnoses of Dysphagia, unspecified type, Early satiety, Bloating, Constipation, unspecified constipation type, and Incontinence of feces, unspecified fecal incontinence type were also pertinent to this visit.    PCP: Aly Rand     Ref: Jeannette Pringle NP    HPI:This is a 71 y.o. Female with a PMH of  scleroderma, ILD, PHTN here today for the following GI symptoms:    GERD.   Pyrosis. 2-3 per week   Regurgitation. 2-3 per week   Nocturnal reflux    MEDs:  aciphex 20 BID  Gaviscon prn     Dysphagia. Occasional-rare.  Few per month      Dry mouth.   Using biotin    Gastroparesis.   Denies nausea, vomiting   Early satiety occasional     Following GP diet.   Seen by dietitian re gp diet     Abd pain. Resolved   Improved   No meds     Bloating and gas. Resolved   Consumes some lactose  Avoids artificial sugars.    Lactase deficiency  Following lactose free diet    Constipation Improved   [place] type 4-5  BM 2/day     Med:  No longer on motegrity  No miralax     Fecal incontinece. Occasional   Completed PT w improvement  Not interested in interstim at this time     Anemia.Resolved  No overt bleeding.   Ho IV iron  On Fe daily   Rheum labs pending     Denies diarrhea, melena, BRBPR.    Weight gain. Stable  153 from 153      Total visit time was 23 minutes, more than 50% of which was spent in face-to-face counseling with patient regarding symptoms, diagnostic results, prognosis, risks and benefits of treatment options, instructions for management, stress reduction, coping strategies and coordination of care.        Previous Studies:  EGD 07/01/2022: Normal esophagus.  Grade a esophagitis.  Esophageal mucosal changes consistent with short-segment Pagan's.  Biopsied (intestinal metaplasia, no dysplasia).  Benign-appearing stenosis.  The stenosis was more  than 13 cm in length.  The stenosis was transverse.  Z-line 38 cm.  2 cm hiatal hernia.  Normal stomach.  Sessile polyps in the stomach (hyperplastic).  Normal duodenum (-).    Rectum manometry 05/12/2021:  Low basal sphincter pressure.  Inadequate squeeze.  Dyssynergic defecation type 2.  Hypersensitivity in rectum.  Rare present.  Patient unable to evacuate 50 cc balloon.  Colonoscopy 05/10/2021:  Good prep to cecum.  5 mm polyp in ascending colon (TA).  6 mm polyp descending colon (-).  4 2-4 mm polyps at rectosigmoid (Ta + HP).  Nonbleeding internal hemorrhoids.  Repeat in 5 years.  EGD 02/02/2021:  Normal esophagus.  Grade a esophagitis.  Z line irregular (Intestinal metaplasia).  2 cm hiatal hernia.  Erythema in the body (-).  Few gastric polyps (FG).  Normal duodenum (mild increased intraepithelial lymphocytes (see comment, Dissach: lactase deficiency).  Gastric emptying study 05/04/2020:  Delayed gastric emptying.  For hour % retention of 25%.  PH impedance 03/06/2020:  Significant acid reflux of PI.  Total % pH less than 4 was 41.  [place] 133.  Manometry 03/04/2020:  Low LES pressure with complete relaxation.  Absent contractility.  Incomplete bolus clearance.  Hiatal hernia.  Abnormal multiple rapid swallow test.  Unable to perform apple and nancy cracker swallows.  Evidence of residual liquid after 150 cc bolus  MBSS 10/31/19:Flash penetration, no aspiration.  Moderate vallecular pooling, mild Piriforms sinus pooling. The results of this MBSS indicate that patient demonstrates moderate swallowing dysfunction c/b intermittent silent penetration of thin liquids as well as moderate pharyngeal residue across consistencies. No aspiration observed. Given patient's esophageal symptoms and her history of scleroderma, prognosis for improvement of swallowing with therapy is good/ fair.   -Diet: recommend thin liquids and regular as tolerated.   -Therapeutic technique: recommend small bites/sips, alternate solid  with liquid wash and multiple swallows per bolus.  -Dysphagia therapy, for 4-6 sessions over the course of 4-6 weeks, in order to increase tongue base retraction, increase pharyngeal contraction and increase swallow safety by implementing therapeutic maneuvers.   -Repeat MBSS in 6-8 weeks for reeval after completion of dysphagia therapy.  -Contact clinician or referring provider for repeat MBSS if swallowing status changes or worsens.  -Recommend follow-up with GI.   3/29/19 EGD:dilation in the entire esophagus. irreg zline (-). 3 cm HH. Benign appearing esophageal stenosis. Nl stomach. Few gastric polyps (hp). Nl duodenum (-).   3/29/19 colon:GPTTI. Five 2-5 mm TC polyps (TAs). Rpt in 3 yrs  1/26/2017 EGD Lammi: NL esophagus(-).  irrg z line (-). Nl stomach. Duodenal congestion (mild chr inflamm). Esophageal polyps (hp). Rpt 6 months to check for complete removal.   2014 EGD Dr. Gusman-GEJ polyps. bx-chronic inflammation;hyperplastic change. Repeat in 6 months for surveillance.   2014 colonoscopy Dr. Gusman - 3 mm descending colon polyp. Medium internal hemorrhoids. path- fecal matter, no human tissue present. Repeat in 5 years.  2011 EGD Ch- HH. White nummular esophageal lesions. irregular z line. Esophageal nodule. Path- mild chronic gastritis. No IM. Esophageal candida.  2011 colonoscopy Ch- sigmoid tics. Internal hemorrhoids. 4 mm ascending colon polyp. Path-hyperplastic polyp. repeat in 5 years.   2009 EGD Ch- Schatzki ring, esophogeal nodule, HH, irregular z line. Path-chronic gastritis  2007 colonoscopy Girgrah- WNL  2007 EGD Girgr- irregular z line. Path-chronic inflammation. No IM.      ROS:  Constitutional: No fevers, no chills, +weight loss,   ENT: No allergies  CV: No chest pain, no palpitations, +  perif. edema, no sob on exertion  Pulm: No cough, no shortness of breath, + wheezes, no sputum  Ophtho: No vision changes  GI: see HPI; also no nausea, no vomiting, no change in appetite  Derm:  No rash  Heme: No lymphadenopathy, No bruising  MSK: + arthritis, no muscle pain, no muscle weakness  : No dysuria, No hematuria  Endo: No hot or cold intolerance  Neuro: No syncope, No seizure,     PMHX:  has a past medical history of Abnormal Pap smear, Acid reflux, Allergy, Arthritis, Encounter for blood transfusion, GERD (gastroesophageal reflux disease), History of migraine headaches, colonic polyp, Hypertension, Idiopathic neuropathy (7/20/2012), ILD (interstitial lung disease) (11/6/2013), Iron deficiency anemia (3/18/2014), MRSA carrier, Osteopenia, Pneumonia, Pulmonary fibrosis, Pulmonary hypertension, Raynaud's disease, Scleroderma, diffuse, Sjogren's syndrome, and Vitamin D deficiency (11/14/2013).    PSHX:  has a past surgical history that includes Dilation and curettage of uterus; Breast biopsy; Cervical conization w/ laser (1970); Hysterectomy (1990); Esophagogastroduodenoscopy; Colonoscopy; Esophagogastroduodenoscopy (N/A, 3/29/2019); Colonoscopy (N/A, 3/29/2019); Esophageal manometry with measurement of impedance (N/A, 3/4/2020); 24 hour impedance pH monitoring of esophagus in patient not taking acid reducing medications (N/A, 3/4/2020); Varicose vein surgery; Right heart catheterization (Right, 1/5/2021); Esophagogastroduodenoscopy (N/A, 2/2/2021); Colonoscopy (N/A, 5/10/2021); Anorectal manometry (N/A, 5/10/2021); and Esophagogastroduodenoscopy (N/A, 7/1/2022).    The patient's social and family histories were reviewed by me and updated in the appropriate section of the electronic medical record.    Review of patient's allergies indicates:   Allergen Reactions    Sulfa (sulfonamide antibiotics)      Other reaction(s): Rash       Current Outpatient Medications   Medication Sig    acetaminophen-codeine 300-30mg (TYLENOL #3) 300-30 mg Tab Take 1 tablet by mouth daily as needed (pain).    albuterol (VENTOLIN HFA) 90 mcg/actuation inhaler Inhale 2 puffs into the lungs every 4 (four) hours as needed  for Wheezing. Rescue    carboxymethylcellulose sodium (REFRESH TEARS) 0.5 % Drop Apply 1-2 drops to every as needed    ergocalciferol (ERGOCALCIFEROL) 50,000 unit Cap Take 1 capsule (50,000 Units total) by mouth every 7 days.    ferrous sulfate 325 (65 FE) MG EC tablet Take 325 mg by mouth 2 (two) times daily.    multivitamin capsule Take 1 capsule by mouth once daily.    mycophenolate mofetil (CELLCEPT) 200 mg/mL SusR Take 5 mLs (1,000 mg total) by mouth 2 (two) times daily.    nabumetone (RELAFEN) 750 MG tablet Take 1 tablet (750 mg total) by mouth 2 (two) times daily as needed for Pain.    NIFEdipine (ADALAT CC) 90 MG TbSR TAKE 1 TABLET(90 MG) BY MOUTH EVERY DAY    NIFEdipine (PROCARDIA-XL) 30 MG (OSM) 24 hr tablet Take 1 tablet (30 mg total) by mouth once daily.    pravastatin (PRAVACHOL) 20 MG tablet TAKE 1 TABLET(20 MG) BY MOUTH EVERY EVENING    pregabalin (LYRICA) 300 MG Cap Take 1 capsule (300 mg total) by mouth 2 (two) times daily.    PREVIDENT 5000 BOOSTER PLUS 1.1 % Pste SMARTSIG:To Teeth    PREVIDENT 5000 SENSITIVE 1.1-5 % Pste BRUSH FOR 2 MINUTES TWICE PER DAY    RABEprazole (ACIPHEX) 20 mg tablet Take 1 tablet (20 mg total) by mouth 2 (two) times daily.    tadalafil (ADCIRCA) 20 mg Tab Take 2 tablets (40 mg total) by mouth once daily.    tiZANidine (ZANAFLEX) 4 MG tablet Take 1 tablet (4 mg total) by mouth nightly as needed (muscle pain).    cadexomer iodine (IODOSORB) 0.9 % gel Apply topically daily as needed for Wound Care. (Patient not taking: Reported on 9/19/2022)    flu vac 2022 65up-knaNY20X,PF, (FLUAD QUAD 2022-23,65Y UP,,PF,) 60 mcg (15 mcg x 4)/0.5 mL Syrg Inject into the muscle.    sars-cov-2, covid-19, (MODERNA COVID-19) 50 mcg/0.25 ml injection (BOOSTER) Inject into the muscle.    sodium chloride 0.9% 0.9 % irrigation Irrigate with 2 mLs as directed daily as needed (wound cleaning).     Current Facility-Administered Medications   Medication    acetaminophen tablet 650 mg    albuterol  "inhaler 2 puff    diphenhydrAMINE injection 25 mg    EPINEPHrine (EPIPEN) 0.3 mg/0.3 mL pen injection 0.3 mg    methylPREDNISolone sodium succinate injection 40 mg    ondansetron disintegrating tablet 4 mg    sodium chloride 0.9% 500 mL flush bag    sodium chloride 0.9% flush 10 mL         Objective Findings:    Vital Signs:  /62   Pulse 74   Ht 5' 5" (1.651 m)   Wt 69.4 kg (152 lb 14.4 oz)   SpO2 99%   BMI 25.44 kg/m²  Body mass index is 25.44 kg/m².    Physical Exam:   General appearance: alert, cooperative, no distress, evidence of systemic sclerosis w  narrow oral aperture.   HENT: Normocephalic, atraumatic, neck symmetrical, no nasal discharge  Eyes: conjunctivae/corneas clear, PERRL, EOM's intact  Lungs: clear to auscultation bilaterally, no dullness to percussion bilaterally  Heart: regular rate and rhythm without rub; no displacement of the PMI  Abdomen: soft, non-tender; bowel sounds normoactive; no organomegaly  Extremities: extremities symmetric; no clubbing, cyanosis, or edema,  raynauds,   sclerodactyly, osteolysis of digits, digital ulcers.  Integument: Skin color, texture, turgor normal; no rashes; hair distrubution normal,  telangectasia, tightness of skin.    Neurologic: Alert and oriented X 3, normal strength, normal coordination and gait  Psychiatric: no pressured speech; normal affect; no evidence of impaired cognition        Labs:  Lab Results   Component Value Date    WBC 3.79 (L) 09/09/2022    HGB 12.2 09/09/2022    HCT 36.0 (L) 09/09/2022     09/09/2022    CHOL 125 05/10/2019    TRIG 87 05/10/2019    HDL 43 05/10/2019    ALT 13 09/09/2022    AST 21 09/09/2022     09/09/2022    K 4.1 09/09/2022     09/09/2022    CREATININE 0.7 09/09/2022    BUN 15 09/09/2022    CO2 24 09/09/2022    TSH 1.371 12/08/2020    INR 1.0 06/29/2015    HGBA1C 5.4 12/10/2020         Assessment and plan:  This is a 71 y.o. Female with a PMH of  scleroderma, ILD, PHTN here today for the " following GI symptoms:    GERD.    Severe GERD on Ph impedance  2cm HH  EGD w Gr A esophagitis on aciphex BID  Ho aspiration pneumonia x 4 and pulmonolgy/rheumatology is concerned it may be due to reflux  No improvement with omeprazole 40 mg BID  Unable to get dexilant 30mg BID  -aciphex 20mg BID   -Add Gaviscon w alginate at night   -Discussed pathophysiology, presentation and treatment of GERD including surgical options  -Discussed that gastroparesis may be contributing to GERD symptoms GP diet should help manage symptoms.    -Discussed that dysphagia makes surgical intervention more complicated.    -Discussed GERD lifestyle precautions    -Ref to Dr. Segura to discuss surgical treatment options      Pagan's Esophagus.  No dysplasia  -Repeat EGD w GEJ bx in 3 years - 02/02/2027:      Dysphagia.   Esophageal manometry w scleroderma esophagus  Improvement with dilation in the past.   -Swallowing precautions    Oropharyngeal dysphagia.   MBSS with moderate swallowing dysfunction. No aspiration observed.Recs: Dysphagia therapy, for 4-6 sessions over the course of 4-6 weeks, in order to increase tongue base retraction, increase pharyngeal contraction and increase swallow safety by implementing therapeutic maneuvers. Repeat MBSS in 6-8 weeks for reeval after completion of dysphagia therapy.  -Never went to speech therapy, but doing well right now.      Dry mouth.   -biotin    History of  esophageal candida    Gastroparesis. Early satiety.  No nausea or vomiting.   -GP diet.   -Seen gi dietitian   -Stop prucalopride due to increased FI     Epigastric discomfort. Lower abd discomfort related to BMs.  Resolved     Gas and bloating. distention. Improved   Lactase deficiency   -Avoids artificial sugars.  -eliminate lactose  -Eliminate honey     Constipation.  Resolved    Fecal incontinence.   ARM abnormal  -Stoped prucalopride due to increased FI  -Improved w PT  -Not interested in Inter Stim     Recurrent ROXANNE off iron .    Resolved on iron   Egd/Colon negative 2021. VCE negative 2021     EGD w Gr A esophagitis   -Po Fe daily       Weigh loss.  Stable  -Shakes daily     Duodenum w increased lymphocytes, nl villi on gluten.  TTG IGA negative on gluten  HLA Dq2/8 negative     Colon polyps.   -C-scope repeat 5/2026    Systemic Sclerosis      Follow up in about 5 months (around 2/19/2023).      1. Gastroesophageal reflux disease, unspecified whether esophagitis present    2. Dysphagia, unspecified type    3. Early satiety    4. Bloating    5. Constipation, unspecified constipation type    6. Incontinence of feces, unspecified fecal incontinence type          Orders Placed This Encounter   Procedures    Ambulatory referral/consult to General Surgery     Standing Status:   Future     Standing Expiration Date:   10/19/2023     Referral Priority:   Routine     Referral Type:   Surgical     Referral Reason:   Specialty Services Required     Referred to Provider:   Katherine Segura MD     Requested Specialty:   General Surgery     Number of Visits Requested:   1           Thank you for allowing me to participate in the care of Yamel Mendoza MD

## 2022-09-28 ENCOUNTER — TELEPHONE (OUTPATIENT)
Dept: INTERNAL MEDICINE | Facility: CLINIC | Age: 72
End: 2022-09-28
Payer: MEDICARE

## 2022-09-28 NOTE — TELEPHONE ENCOUNTER
----- Message from Florence Garzon sent at 9/28/2022  4:21 PM CDT -----  Contact: pt [phone]  Pt is returning a call she missed from Anay. Per pt, please call.          Thank you      Normal

## 2022-10-04 ENCOUNTER — OFFICE VISIT (OUTPATIENT)
Dept: SURGERY | Facility: CLINIC | Age: 72
End: 2022-10-04
Payer: MEDICARE

## 2022-10-04 VITALS
SYSTOLIC BLOOD PRESSURE: 132 MMHG | DIASTOLIC BLOOD PRESSURE: 60 MMHG | WEIGHT: 154 LBS | HEIGHT: 65 IN | BODY MASS INDEX: 25.66 KG/M2 | HEART RATE: 84 BPM

## 2022-10-04 DIAGNOSIS — G89.4 CHRONIC PAIN DISORDER: ICD-10-CM

## 2022-10-04 DIAGNOSIS — G60.9 IDIOPATHIC NEUROPATHY: ICD-10-CM

## 2022-10-04 DIAGNOSIS — I10 ESSENTIAL HYPERTENSION: ICD-10-CM

## 2022-10-04 DIAGNOSIS — J84.9 ILD (INTERSTITIAL LUNG DISEASE): ICD-10-CM

## 2022-10-04 DIAGNOSIS — K21.00 HIATAL HERNIA WITH GERD AND ESOPHAGITIS: Primary | ICD-10-CM

## 2022-10-04 DIAGNOSIS — M34.1 SCLERODERMA OF ESOPHAGUS: ICD-10-CM

## 2022-10-04 DIAGNOSIS — M34.9 SCLERODERMA WITH PULMONARY INVOLVEMENT: ICD-10-CM

## 2022-10-04 DIAGNOSIS — I73.9 PVD (PERIPHERAL VASCULAR DISEASE): ICD-10-CM

## 2022-10-04 DIAGNOSIS — K44.9 HIATAL HERNIA WITH GERD AND ESOPHAGITIS: Primary | ICD-10-CM

## 2022-10-04 DIAGNOSIS — I27.29 PULMONARY HYPERTENSION ASSOCIATED WITH SYSTEMIC DISORDER: Chronic | ICD-10-CM

## 2022-10-04 DIAGNOSIS — I73.00 RAYNAUD'S DISEASE WITHOUT GANGRENE: ICD-10-CM

## 2022-10-04 PROCEDURE — 99214 OFFICE O/P EST MOD 30 MIN: CPT | Mod: PBBFAC,NTX | Performed by: SURGERY

## 2022-10-04 PROCEDURE — 99999 PR PBB SHADOW E&M-EST. PATIENT-LVL IV: ICD-10-PCS | Mod: PBBFAC,TXP,, | Performed by: SURGERY

## 2022-10-04 PROCEDURE — 99999 PR PBB SHADOW E&M-EST. PATIENT-LVL IV: CPT | Mod: PBBFAC,TXP,, | Performed by: SURGERY

## 2022-10-04 PROCEDURE — 99214 OFFICE O/P EST MOD 30 MIN: CPT | Mod: S$PBB,NTX,, | Performed by: SURGERY

## 2022-10-04 PROCEDURE — 99214 PR OFFICE/OUTPT VISIT, EST, LEVL IV, 30-39 MIN: ICD-10-PCS | Mod: S$PBB,NTX,, | Performed by: SURGERY

## 2022-10-04 RX ORDER — CEFAZOLIN SODIUM 1 G/50ML
1 SOLUTION INTRAVENOUS
Status: CANCELLED | OUTPATIENT
Start: 2022-10-04

## 2022-10-04 RX ORDER — SODIUM CHLORIDE 9 MG/ML
INJECTION, SOLUTION INTRAVENOUS CONTINUOUS
Status: CANCELLED | OUTPATIENT
Start: 2022-10-04

## 2022-10-04 NOTE — PROGRESS NOTES
General Surgery Office Visit   History and Physical    Patient Name: Yamel Shah  YOB: 1950 (71 y.o.)  MRN: [id number]  Today's Date: 10/04/2022    Referring Md:   Annamaria Mendoza Md  [address] [place] [address]    SUBJECTIVE:     Chief Complaint: GERD    History of Present Illness:  Yamel Shah is a 71 y.o. female with PMHx of pHTN, ILD, ROXANNE, scleroderma, Raynaud's who presents to the clinic today complaining of GERD for the last 30 years. Has been taking Aciphex for last 2 years and still reports symptoms. Reports bloating, dysphagia, burning in throat, early satiety, regurgitation. She denies fever, chills, unintentional weight loss, n/v/d, constipation, hematochezia, dysuria, hematuria, CP, SOB, and all other symptoms. Patient reports being compliant with home medication regimen.     Taking Gaviscon nightly.    GERD Questionnaire:  On PPI-currently Aciphex with minor improvement  no Typical heartburn  yes Regurgitation  Occasional Dysphagia solids  occasional Dysphagia liquids  occasional Hoarseness  occasional Sore throat  occasional Cough  no Asthma  no Chest pain  occasional Water brash  no Globus  no Nausea  no Vomiting    Patient denies personal history of MI, CVA, DM  Previous abdominal surgeries include: hysterectomy with bilat salpingectomy   Denies alcohol, tobacco, and elicit drug use.   Not currently on any anticoagulants      Review of patient's allergies indicates:   Allergen Reactions    Sulfa (sulfonamide antibiotics)      Other reaction(s): Rash    Tramadol Itching       Past Medical History:   Diagnosis Date    Abnormal Pap smear     Acid reflux     Allergy     Arthritis     Encounter for blood transfusion     GERD (gastroesophageal reflux disease)     History of migraine headaches     Hx of colonic polyp     Hypertension     Idiopathic neuropathy 7/20/2012    ILD (interstitial lung disease) 11/6/2013    Iron deficiency  anemia 3/18/2014    MRSA carrier     Osteopenia     Pneumonia     Pulmonary fibrosis     Pulmonary hypertension     Raynaud's disease     Scleroderma, diffuse     Sjogren's syndrome     Vitamin D deficiency 11/14/2013     Past Surgical History:   Procedure Laterality Date    24 HOUR IMPEDANCE PH MONITORING OF ESOPHAGUS IN PATIENT NOT TAKING ACID REDUCING MEDICATIONS N/A 3/4/2020    Procedure: IMPEDANCE PH STUDY, ESOPHAGEAL, 24 HOUR, IN PATIENT NOT TAKING ACID REDUCING MEDICATION;  Surgeon: Annamaria Mendoza MD;  Location: [facility] (4TH FLR);  Service: Endoscopy;  Laterality: N/A;  OFF PPI/H2 Blocker   Motility Studies   Hold Narcotics x 1 days   Hold TCA x 1 days  2/26 - LVM attempting to confirm appt  2/27 - Confirmed appt    ANORECTAL MANOMETRY N/A 5/10/2021    Procedure: MANOMETRY, ANORECTAL with balloon expulsion test;  Surgeon: Annamaria Mendoza MD;  Location: [facility] (4TH FLR);  Service: Endoscopy;  Laterality: N/A;  order combined  covid test 5/7 Denominational, instructions emailed-[facility]    BREAST BIOPSY      Left, benign    CERVICAL CONIZATION   W/ LASER  1970    COLONOSCOPY      COLONOSCOPY N/A 3/29/2019    Procedure: COLONOSCOPY;  Surgeon: Annamaria Mendoza MD;  Location: [facility] (2ND FLR);  Service: Endoscopy;  Laterality: N/A;    COLONOSCOPY N/A 5/10/2021    Procedure: COLONOSCOPY;  Surgeon: Annamaria Mendoza MD;  Location: [facility] (4TH FLR);  Service: Endoscopy;  Laterality: N/A;  2nd floor-previous scopes done on 2nd floor, gastroparesis  full liquid diet x2 days, clear liquid x1 day prior to procedure  covid test 5/7 Denominational, instructions emailed-vt  5/6 pt confirmed appt-[facility]    DILATION AND CURETTAGE OF UTERUS      ESOPHAGEAL MANOMETRY WITH MEASUREMENT OF IMPEDANCE N/A 3/4/2020    Procedure: MANOMETRY, ESOPHAGUS, WITH IMPEDANCE MEASUREMENT;  Surgeon: Annamaria Mendoza MD;  Location: [facility] (4TH FLR);  Service: Endoscopy;  Laterality: N/A;  OFF PPI/H2 Blocker   Motility Studies    Hold Narcotics x 1 days   Hold TCA x 1 days    ESOPHAGOGASTRODUODENOSCOPY      ESOPHAGOGASTRODUODENOSCOPY N/A 3/29/2019    Procedure: EGD (ESOPHAGOGASTRODUODENOSCOPY);  Surgeon: Annamaria Mendoza MD;  [facility]  Service: Endoscopy;  Laterality: N/A;  pulmonary htn    ESOPHAGOGASTRODUODENOSCOPY N/A 2/2/2021    Procedure: ESOPHAGOGASTRODUODENOSCOPY (EGD);  Surgeon: Annamaria Mendoza MD;  [facility]  Service: Endoscopy;  Laterality: N/A;  2nd floor gastroparesis  3 days full liquid diet and 1 day clears  covid test 1/30 primary care, instructions sent to [facility]    ESOPHAGOGASTRODUODENOSCOPY N/A 7/1/2022    Procedure: ESOPHAGOGASTRODUODENOSCOPY (EGD);  Surgeon: Annamaria Mendoza MD;  Location: [facility] ([address] [place] [address]);  Service: Endoscopy;  Laterality: N/A;  2nd floor-gastroparesis  full liquid diet x3 days, clear liquid diet x1 day prior to procedure  fully vaccinated, instructions sent to [facility]  6/29-pt confirmed new arrival time-[facility]    HYSTERECTOMY  1990 TAH (AUB, Fibroids), ovaries remain    RIGHT HEART CATHETERIZATION Right 1/5/2021    Procedure: INSERTION, CATHETER, RIGHT HEART;  Surgeon: Aureliano Sy MD;  Location: [facility] CATH LAB;  Service: Cardiology;  Laterality: Right;    VARICOSE VEIN SURGERY       Family History   Problem Relation Age of Onset    Breast cancer Mother     Hypertension Father     Breast cancer Sister     Diabetes Sister     Osteoarthritis Brother     Diabetes Brother     No Known Problems Daughter     No Known Problems Daughter     No Known Problems Son     No Known Problems Son     Breast cancer Maternal Aunt     Melanoma Neg Hx     Colon cancer Neg Hx     Crohn's disease Neg Hx     Stomach cancer Neg Hx     Ulcerative colitis Neg Hx     Rectal cancer Neg Hx     Irritable bowel syndrome Neg Hx     Esophageal cancer Neg Hx     Celiac disease Neg Hx     Ovarian cancer Neg Hx     Liver cancer Neg Hx      "Pancreatic cancer Neg Hx      Social History     Tobacco Use    Smoking status: Never    Smokeless tobacco: Never   Substance Use Topics    Alcohol use: Yes     Comment: wine occasionally    Drug use: No        Review of Systems:  ROS    OBJECTIVE:     Vital Signs (Most Recent)  Ht 5' 5" (1.651 m)   Wt 69.8 kg (153 lb 15.9 oz)   BMI 25.63 kg/m²     Physical Exam      Labs:     Lab Results   Component Value Date    WBC 3.79 (L) 09/09/2022    HGB 12.2 09/09/2022    HCT 36.0 (L) 09/09/2022    MCV 89 09/09/2022     09/09/2022         CMP  Sodium   Date Value Ref Range Status   09/09/2022 139 136 - 145 mmol/L Final     Potassium   Date Value Ref Range Status   09/09/2022 4.1 3.5 - 5.1 mmol/L Final     Chloride   Date Value Ref Range Status   09/09/2022 109 95 - 110 mmol/L Final     CO2   Date Value Ref Range Status   09/09/2022 24 23 - 29 mmol/L Final     Glucose   Date Value Ref Range Status   09/09/2022 99 70 - 110 mg/dL Final     BUN   Date Value Ref Range Status   09/09/2022 15 8 - 23 mg/dL Final     Creatinine   Date Value Ref Range Status   09/09/2022 0.7 0.5 - 1.4 mg/dL Final     Calcium   Date Value Ref Range Status   09/09/2022 8.8 8.7 - 10.5 mg/dL Final     Total Protein   Date Value Ref Range Status   09/09/2022 8.0 6.0 - 8.4 g/dL Final     Albumin   Date Value Ref Range Status   09/09/2022 3.5 3.5 - 5.2 g/dL Final     Total Bilirubin   Date Value Ref Range Status   09/09/2022 0.3 0.1 - 1.0 mg/dL Final     Comment:     For infants and newborns, interpretation of results should be based  on gestational age, weight and in agreement with clinical  observations.    Premature Infant recommended reference ranges:  Up to 24 hours.............<8.0 mg/dL  Up to 48 hours............<12.0 mg/dL  3-5 days..................<15.0 mg/dL  6-29 days.................<15.0 mg/dL       Alkaline Phosphatase   Date Value Ref Range Status   09/09/2022 120 55 - 135 U/L Final     AST   Date Value Ref Range Status "   09/09/2022 21 10 - 40 U/L Final     ALT   Date Value Ref Range Status   09/09/2022 13 10 - 44 U/L Final     Anion Gap   Date Value Ref Range Status   09/09/2022 6 (L) 8 - 16 mmol/L Final     eGFR if    Date Value Ref Range Status   04/07/2022 >60.0 >60 mL/min/1.73 m^2 Final     eGFR if non    Date Value Ref Range Status   04/07/2022 >60.0 >60 mL/min/1.73 m^2 Final     Comment:     Calculation used to obtain the estimated glomerular filtration  rate (eGFR) is the CKD-EPI equation.              Imaging: EGD 7/1/22                         - LA Grade A reflux esophagitis with no bleeding.                          - Esophageal mucosal changes consistent with                          short-segment Pagan's esophagus. Biopsied.                          - Benign-appearing patent esophageal stenosis.                          - Z-line irregular, 38 cm from the incisors.                          - 2 cm hiatal hernia.                    Esophageal manometry 3/4/20            Low LES pressure with complete relaxation                         Absent contractility (Scleroderma esophagus)                         Incomplete bolus clearance                         Hiatal hernia                         Abnormal multiple rapid swallows test                         No significant difference with upright swallows                         Unable to properly perform applesauce and nancy                         cracker swallows without double swallowing                         Evidence of residual liquid in esophagus after 150                         cc bolus       ASSESSMENT/PLAN:     Diagnoses and all orders for this visit:    Gastroesophageal reflux disease, unspecified whether esophagitis present  -     Ambulatory referral/consult to General Surgery      Yameltiffany Shah is a 71 y.o. female PMHx of pHTN, ILD, ROXANNE, scleroderma, Raynaud's here for GERD. Patient has failed medical management for  her GERD and       - Schedule for hiatal hernia repair without fundoplication  - Consider pyloroplasty at future date given gastroparesis symptoms  - Patient instructed to call clinic with any questions, concerns, or new symptoms  - Patient understands and in agreement with plan; all questions answered    Case discussed with Dr. Segura.      Flakito Dahl M.D.  General Surgery PGY-1  [facility]

## 2022-10-05 ENCOUNTER — TELEPHONE (OUTPATIENT)
Dept: SURGERY | Facility: CLINIC | Age: 72
End: 2022-10-05
Payer: MEDICARE

## 2022-10-05 DIAGNOSIS — K44.9 HIATAL HERNIA WITH GERD AND ESOPHAGITIS: Primary | ICD-10-CM

## 2022-10-05 DIAGNOSIS — K21.00 HIATAL HERNIA WITH GERD AND ESOPHAGITIS: Primary | ICD-10-CM

## 2022-10-05 NOTE — TELEPHONE ENCOUNTER
MAILED SX BOOK , POST OP DIET RESTRICTIONS , AN INFORMED PATIENT OF PRE-OP LABS/EKG THAT'LL BE NEEDED.

## 2022-10-07 ENCOUNTER — HOSPITAL ENCOUNTER (OUTPATIENT)
Dept: RADIOLOGY | Facility: CLINIC | Age: 72
Discharge: HOME | End: 2022-10-07
Attending: STUDENT IN AN ORGANIZED HEALTH CARE EDUCATION/TRAINING PROGRAM
Payer: MEDICARE

## 2022-10-07 ENCOUNTER — HOSPITAL ENCOUNTER (OUTPATIENT)
Dept: PULMONOLOGY | Facility: CLINIC | Age: 72
Discharge: HOME | End: 2022-10-07
Payer: MEDICARE

## 2022-10-07 VITALS — WEIGHT: 155 LBS | HEIGHT: 65 IN | BODY MASS INDEX: 25.83 KG/M2

## 2022-10-07 DIAGNOSIS — J84.9 ILD (INTERSTITIAL LUNG DISEASE): ICD-10-CM

## 2022-10-07 DIAGNOSIS — M85.80 OSTEOPENIA, UNSPECIFIED LOCATION: ICD-10-CM

## 2022-10-07 DIAGNOSIS — Z78.0 ASYMPTOMATIC MENOPAUSAL STATE: ICD-10-CM

## 2022-10-07 PROCEDURE — 94726 PLETHYSMOGRAPHY LUNG VOLUMES: CPT | Mod: 26,S$PBB,NTX, | Performed by: INTERNAL MEDICINE

## 2022-10-07 PROCEDURE — 94010 BREATHING CAPACITY TEST: CPT | Mod: PBBFAC,NTX | Performed by: INTERNAL MEDICINE

## 2022-10-07 PROCEDURE — 94618 PULMONARY STRESS TESTING: CPT | Mod: PBBFAC,NTX | Performed by: INTERNAL MEDICINE

## 2022-10-07 PROCEDURE — 94729 PR C02/MEMBANE DIFFUSE CAPACITY: ICD-10-PCS | Mod: 26,S$PBB,NTX, | Performed by: INTERNAL MEDICINE

## 2022-10-07 PROCEDURE — 77080 DEXA BONE DENSITY SPINE HIP: ICD-10-PCS | Mod: 26,NTX,S$GLB, | Performed by: INTERNAL MEDICINE

## 2022-10-07 PROCEDURE — 94618 PULMONARY STRESS TESTING: ICD-10-PCS | Mod: 26,S$PBB,NTX, | Performed by: INTERNAL MEDICINE

## 2022-10-07 PROCEDURE — 94726 PULM FUNCT TST PLETHYSMOGRAP: ICD-10-PCS | Mod: 26,S$PBB,NTX, | Performed by: INTERNAL MEDICINE

## 2022-10-07 PROCEDURE — 94729 DIFFUSING CAPACITY: CPT | Mod: PBBFAC,NTX | Performed by: INTERNAL MEDICINE

## 2022-10-07 PROCEDURE — 94010 BREATHING CAPACITY TEST: CPT | Mod: 26,S$PBB,NTX, | Performed by: INTERNAL MEDICINE

## 2022-10-07 PROCEDURE — 94726 PLETHYSMOGRAPHY LUNG VOLUMES: CPT | Mod: PBBFAC,NTX | Performed by: INTERNAL MEDICINE

## 2022-10-07 PROCEDURE — 94729 DIFFUSING CAPACITY: CPT | Mod: 26,S$PBB,NTX, | Performed by: INTERNAL MEDICINE

## 2022-10-07 PROCEDURE — 94618 PULMONARY STRESS TESTING: CPT | Mod: 26,S$PBB,NTX, | Performed by: INTERNAL MEDICINE

## 2022-10-07 PROCEDURE — 77080 DXA BONE DENSITY AXIAL: CPT | Mod: 26,NTX,S$GLB, | Performed by: INTERNAL MEDICINE

## 2022-10-07 PROCEDURE — 94010 BREATHING CAPACITY TEST: ICD-10-PCS | Mod: 26,S$PBB,NTX, | Performed by: INTERNAL MEDICINE

## 2022-10-07 PROCEDURE — 77080 DXA BONE DENSITY AXIAL: CPT | Mod: TC,NTX

## 2022-10-10 ENCOUNTER — TELEPHONE (OUTPATIENT)
Dept: TRANSPLANT | Facility: CLINIC | Age: 72
End: 2022-10-10
Payer: MEDICARE

## 2022-10-10 NOTE — PROCEDURES
Yamel Shah is a 71 y.o.  female patient, who presents for a 6 minute walk test ordered by MD Steve.  The diagnosis is Scleroderma/CREST; Interstitial Lung Disease.  The patient's BMI is 25.8 kg/m2.  Predicted distance (lower limit of normal) is 308.91 meters.      Test Results:    The test was completed without stopping.  The total time walked was 360 seconds.  During walking, the patient reported:  Dyspnea, Lightheadedness.  The patient used no assistive devices during testing.     10/07/2022---------Distance: 426.72 meters (1400 feet)     O2 Sat % Supplemental Oxygen Heart Rate Blood Pressure Ju Scale   Pre-exercise  (Resting) 98 % Room Air 85 bpm 126/61 mmHg 3   During Exercise 97 % Room Air 108 bpm 138/63 mmHg 4   Post-exercise  (Recovery) 97 % Room Air  103 bpm       Recovery Time: 39 seconds    Performing nurse/tech: Zully ZALDIVAR      PREVIOUS STUDY:   05/03/2022---------Distance: 426.72 meters (1400 feet)       O2 Sat % Supplemental Oxygen Heart Rate Blood Pressure Ju Scale   Pre-exercise  (Resting) 99 % Room Air 71 bpm 115/54 mmHg 3   During Exercise 96 % Room Air 88 bpm 138/63 mmHg 4   Post-exercise  (Recovery) 98 % Room Air  83 bpm   mmHg        CLINICAL INTERPRETATION:  Six minute walk distance is 426.72 meters (1400 feet) with somewhat heavy dyspnea.  During exercise, there was no significant desaturation while breathing room air.  Blood pressure remained stable and Heart rate increased significantly with walking.  The patient reported non-pulmonary symptoms during exercise.  Since the previous study in May 2022, exercise capacity is unchanged.  Based upon age and body mass index, exercise capacity is normal.

## 2022-10-11 LAB
DLCO ADJ PRE: 12.12 ML/(MIN*MMHG) (ref 16.17–27.64)
DLCO SINGLE BREATH LLN: 16.17
DLCO SINGLE BREATH PRE REF: 54.3 %
DLCO SINGLE BREATH REF: 21.9
DLCOC SBVA LLN: 2.89
DLCOC SBVA PRE REF: 100.6 %
DLCOC SBVA REF: 4.29
DLCOC SINGLE BREATH LLN: 16.17
DLCOC SINGLE BREATH PRE REF: 55.3 %
DLCOC SINGLE BREATH REF: 21.9
DLCOCSBVAULN: 5.69
DLCOCSINGLEBREATHULN: 27.64
DLCOSINGLEBREATHULN: 27.64
DLCOVA LLN: 2.89
DLCOVA PRE REF: 98.7 %
DLCOVA PRE: 4.23 ML/(MIN*MMHG*L) (ref 2.89–5.69)
DLCOVA REF: 4.29
DLCOVAULN: 5.69
DLVAADJ PRE: 4.31 ML/(MIN*MMHG*L) (ref 2.89–5.69)
ERV LLN: -16449.34
ERV PRE REF: 91.1 %
ERV REF: 0.66
ERVULN: ABNORMAL
FEF 25 75 LLN: 0.81
FEF 25 75 PRE REF: 95.4 %
FEF 25 75 REF: 1.78
FEV05 LLN: 0.9
FEV05 REF: 1.75
FEV1 FVC LLN: 66
FEV1 FVC PRE REF: 104.1 %
FEV1 FVC REF: 79
FEV1 LLN: 1.52
FEV1 PRE REF: 70.9 %
FEV1 REF: 2.1
FRCPLETH LLN: 1.95
FRCPLETH PREREF: 69.8 %
FRCPLETH REF: 2.77
FRCPLETHULN: 3.59
FVC LLN: 1.99
FVC PRE REF: 67.6 %
FVC REF: 2.69
IVC PRE: 1.85 L (ref 1.99–3.42)
IVC SINGLE BREATH LLN: 1.99
IVC SINGLE BREATH PRE REF: 68.9 %
IVC SINGLE BREATH REF: 2.69
IVCSINGLEBREATHULN: 3.42
LLN IC: -16447.93
PEF LLN: 4.39
PEF PRE REF: 92.9 %
PEF REF: 5.87
PHYSICIAN COMMENT: ABNORMAL
PRE DLCO: 11.89 ML/(MIN*MMHG) (ref 16.17–27.64)
PRE ERV: 0.6 L (ref -16449.34–16450.66)
PRE FEF 25 75: 1.7 L/S (ref 0.81–3.17)
PRE FET 100: 6.31 SEC
PRE FEV05 REF: 73 %
PRE FEV1 FVC: 81.98 % (ref 66.43–89.45)
PRE FEV1: 1.49 L (ref 1.52–2.66)
PRE FEV5: 1.28 L (ref 0.9–2.61)
PRE FRC PL: 1.93 L (ref 1.95–3.59)
PRE FVC: 1.82 L (ref 1.99–3.42)
PRE IC: 1.27 L (ref -16447.93–16452.07)
PRE PEF: 5.45 L/S (ref 4.39–7.35)
PRE REF IC: 61.3 %
PRE RV: 1.33 L (ref 1.53–2.68)
PRE TLC: 3.2 L (ref 4.12–6.09)
PRE VTG: 2.03 L
RAW PRE REF: 133.1 %
RAW PRE: 4.07 CMH2O*S/L (ref 3.06–3.06)
RAW REF: 3.06
REF IC: 2.07
RV LLN: 1.53
RV PRE REF: 63.1 %
RV REF: 2.11
RVTLC LLN: 33
RVTLC PRE REF: 97.1 %
RVTLC PRE: 41.52 % (ref 33.17–52.35)
RVTLC REF: 43
RVTLCULN: 52
RVULN: 2.68
SGAW PRE REF: 94.8 %
SGAW PRE: 0.1 1/(CMH2O*S) (ref 0.1–0.1)
SGAW REF: 0.1
TLC LLN: 4.12
TLC PRE REF: 62.7 %
TLC REF: 5.11
TLC ULN: 6.09
ULN IC: ABNORMAL
VA PRE: 2.81 L (ref 4.96–4.96)
VA SINGLE BREATH LLN: 4.96
VA SINGLE BREATH PRE REF: 56.7 %
VA SINGLE BREATH REF: 4.96
VASINGLEBREATHULN: 4.96
VC LLN: 1.99
VC PRE REF: 69.7 %
VC PRE: 1.87 L (ref 1.99–3.42)
VC REF: 2.69
VC ULN: 3.42

## 2022-10-17 ENCOUNTER — OFFICE VISIT (OUTPATIENT)
Dept: INTERNAL MEDICINE | Facility: CLINIC | Age: 72
End: 2022-10-17
Payer: MEDICARE

## 2022-10-17 VITALS
HEART RATE: 74 BPM | SYSTOLIC BLOOD PRESSURE: 130 MMHG | DIASTOLIC BLOOD PRESSURE: 70 MMHG | RESPIRATION RATE: 14 BRPM | BODY MASS INDEX: 25.64 KG/M2 | HEIGHT: 65 IN | TEMPERATURE: 98 F | WEIGHT: 153.88 LBS | OXYGEN SATURATION: 98 %

## 2022-10-17 DIAGNOSIS — M34.9 SCLERODERMA WITH PULMONARY INVOLVEMENT: ICD-10-CM

## 2022-10-17 DIAGNOSIS — I27.29 PULMONARY HYPERTENSION ASSOCIATED WITH SYSTEMIC DISORDER: Chronic | ICD-10-CM

## 2022-10-17 DIAGNOSIS — L97.529 SKIN ULCERS OF BOTH FEET: ICD-10-CM

## 2022-10-17 DIAGNOSIS — J84.9 ILD (INTERSTITIAL LUNG DISEASE): ICD-10-CM

## 2022-10-17 DIAGNOSIS — I87.2 VENOUS INSUFFICIENCY OF BOTH LOWER EXTREMITIES: ICD-10-CM

## 2022-10-17 DIAGNOSIS — K21.9 GASTROESOPHAGEAL REFLUX DISEASE, UNSPECIFIED WHETHER ESOPHAGITIS PRESENT: ICD-10-CM

## 2022-10-17 DIAGNOSIS — D50.9 IRON DEFICIENCY ANEMIA, UNSPECIFIED IRON DEFICIENCY ANEMIA TYPE: ICD-10-CM

## 2022-10-17 DIAGNOSIS — L97.519 SKIN ULCERS OF BOTH FEET: ICD-10-CM

## 2022-10-17 DIAGNOSIS — I10 ESSENTIAL HYPERTENSION: Primary | ICD-10-CM

## 2022-10-17 PROCEDURE — 99999 PR PBB SHADOW E&M-EST. PATIENT-LVL V: ICD-10-PCS | Mod: PBBFAC,,, | Performed by: INTERNAL MEDICINE

## 2022-10-17 PROCEDURE — 99214 OFFICE O/P EST MOD 30 MIN: CPT | Mod: S$PBB,,, | Performed by: INTERNAL MEDICINE

## 2022-10-17 PROCEDURE — 99214 PR OFFICE/OUTPT VISIT, EST, LEVL IV, 30-39 MIN: ICD-10-PCS | Mod: S$PBB,,, | Performed by: INTERNAL MEDICINE

## 2022-10-17 PROCEDURE — 99999 PR PBB SHADOW E&M-EST. PATIENT-LVL V: CPT | Mod: PBBFAC,,, | Performed by: INTERNAL MEDICINE

## 2022-10-17 PROCEDURE — 99215 OFFICE O/P EST HI 40 MIN: CPT | Mod: PBBFAC,PO | Performed by: INTERNAL MEDICINE

## 2022-11-07 ENCOUNTER — TELEPHONE (OUTPATIENT)
Dept: TRANSPLANT | Facility: CLINIC | Age: 72
End: 2022-11-07
Payer: MEDICARE

## 2022-11-08 ENCOUNTER — OFFICE VISIT (OUTPATIENT)
Dept: TRANSPLANT | Facility: CLINIC | Age: 72
End: 2022-11-08
Payer: MEDICARE

## 2022-11-08 ENCOUNTER — CONFERENCE (OUTPATIENT)
Dept: SURGERY | Facility: CLINIC | Age: 72
End: 2022-11-08
Payer: MEDICARE

## 2022-11-08 VITALS
BODY MASS INDEX: 25.75 KG/M2 | SYSTOLIC BLOOD PRESSURE: 134 MMHG | WEIGHT: 154.56 LBS | DIASTOLIC BLOOD PRESSURE: 63 MMHG | OXYGEN SATURATION: 99 % | HEART RATE: 73 BPM | TEMPERATURE: 98 F | RESPIRATION RATE: 18 BRPM | HEIGHT: 65 IN

## 2022-11-08 DIAGNOSIS — J84.9 ILD (INTERSTITIAL LUNG DISEASE): Primary | ICD-10-CM

## 2022-11-08 DIAGNOSIS — K21.9 GASTROESOPHAGEAL REFLUX DISEASE, UNSPECIFIED WHETHER ESOPHAGITIS PRESENT: ICD-10-CM

## 2022-11-08 DIAGNOSIS — M34.9 SCLERODERMA WITH PULMONARY INVOLVEMENT: ICD-10-CM

## 2022-11-08 PROCEDURE — 99999 PR PBB SHADOW E&M-EST. PATIENT-LVL III: CPT | Mod: PBBFAC,TXP,, | Performed by: INTERNAL MEDICINE

## 2022-11-08 PROCEDURE — 99213 OFFICE O/P EST LOW 20 MIN: CPT | Mod: PBBFAC,NTX | Performed by: INTERNAL MEDICINE

## 2022-11-08 PROCEDURE — 99999 PR PBB SHADOW E&M-EST. PATIENT-LVL III: ICD-10-PCS | Mod: PBBFAC,TXP,, | Performed by: INTERNAL MEDICINE

## 2022-11-08 PROCEDURE — 99214 OFFICE O/P EST MOD 30 MIN: CPT | Mod: S$PBB,NTX,, | Performed by: INTERNAL MEDICINE

## 2022-11-08 PROCEDURE — 99214 PR OFFICE/OUTPT VISIT, EST, LEVL IV, 30-39 MIN: ICD-10-PCS | Mod: S$PBB,NTX,, | Performed by: INTERNAL MEDICINE

## 2022-11-08 NOTE — PROGRESS NOTES
ADVANCED LUNG DISEASE CLINIC FOLLOW-UP                                                                                                                                             Reason for Visit:  SSc-ILD    Referring Physician: No ref. provider found    History of Present Illness: Yamel Shah is a 71 y.o. female who is on 0L of oxygen.  She is on no assisted ventilation.  Her New York Heart Association Class is II and a Karnofsky score of 90% - Able to carry on normal activity: minor symptoms of disease. She is not diabetic.    She presents today for routine SSc-ILD follow-up.  She states that she has been doing well.  Does not have any changes in respiratory symptoms.  Does not require supplemental oxygen.  [place] the [employer] game yesterday and walked up 4 flights of stairs with minimal dyspnea.  Her GERD is fairly well controlled.  She knows her triggers and takes a PPI but does still endorse occasional reflux mainly at night when laying flat.  Denies any cough.  Has PH on Adcirca.  Denies BLE edema.  Overall, doing very well.    Past Medical History:   Diagnosis Date    Abnormal Pap smear     Acid reflux     Allergy     Arthritis     Encounter for blood transfusion     GERD (gastroesophageal reflux disease)     History of migraine headaches     Hx of colonic polyp     Hypertension     Idiopathic neuropathy 7/20/2012    ILD (interstitial lung disease) 11/6/2013    Iron deficiency anemia 3/18/2014    MRSA carrier     Osteopenia     Pneumonia     Pulmonary fibrosis     Pulmonary hypertension     Raynaud's disease     Scleroderma, diffuse     Sjogren's syndrome     Vitamin D deficiency 11/14/2013       Past Surgical History:   Procedure Laterality Date    24 HOUR IMPEDANCE PH MONITORING OF ESOPHAGUS IN PATIENT NOT TAKING ACID REDUCING MEDICATIONS N/A 3/4/2020    Procedure: IMPEDANCE PH STUDY, ESOPHAGEAL, 24 HOUR, IN PATIENT NOT TAKING ACID REDUCING MEDICATION;  Surgeon: Annamaria Mendoza MD;  Location:  [facility] (4TH FLR);  Service: Endoscopy;  Laterality: N/A;  OFF PPI/H2 Blocker   Motility Studies   Hold Narcotics x 1 days   Hold TCA x 1 days  2/26 - LVM attempting to confirm appt  2/27 - Confirmed appt    ANORECTAL MANOMETRY N/A 5/10/2021    Procedure: MANOMETRY, ANORECTAL with balloon expulsion test;  Surgeon: Annamaria Mendoza MD;  Location: [facility] (4TH FLR);  Service: Endoscopy;  Laterality: N/A;  order combined  covid test 5/7 Yarsani, instructions emailed-[facility]    BREAST BIOPSY      Left, benign    CERVICAL CONIZATION   W/ LASER  1970    COLONOSCOPY      COLONOSCOPY N/A 3/29/2019    Procedure: COLONOSCOPY;  Surgeon: Annamaria Mendoza MD;  Location: [facility] (2ND FLR);  Service: Endoscopy;  Laterality: N/A;    COLONOSCOPY N/A 5/10/2021    Procedure: COLONOSCOPY;  Surgeon: Annamaria Mendoza MD;  Location: [facility] (4TH FLR);  Service: Endoscopy;  Laterality: N/A;  2nd floor-previous scopes done on 2nd floor, gastroparesis  full liquid diet x2 days, clear liquid x1 day prior to procedure  covid test 5/7 Yarsani, instructions emailed-[facility]  5/6 pt confirmed appt-[facility]    DILATION AND CURETTAGE OF UTERUS      ESOPHAGEAL MANOMETRY WITH MEASUREMENT OF IMPEDANCE N/A 3/4/2020    Procedure: MANOMETRY, ESOPHAGUS, WITH IMPEDANCE MEASUREMENT;  Surgeon: Annamaria Mendoza MD;  Location: [facility] (4TH FLR);  Service: Endoscopy;  Laterality: N/A;  OFF PPI/H2 Blocker   Motility Studies   Hold Narcotics x 1 days   Hold TCA x 1 days    ESOPHAGOGASTRODUODENOSCOPY      ESOPHAGOGASTRODUODENOSCOPY N/A 3/29/2019    Procedure: EGD (ESOPHAGOGASTRODUODENOSCOPY);  Surgeon: Annamaria Mendoza MD;  Location: [facility] (2ND FLR);  Service: Endoscopy;  Laterality: N/A;  pulmonary htn    ESOPHAGOGASTRODUODENOSCOPY N/A 2/2/2021    Procedure: ESOPHAGOGASTRODUODENOSCOPY (EGD);  Surgeon: Annamaria Mendoza MD;  Location: [facility] (2ND FLR);  Service: Endoscopy;  Laterality: N/A;  2nd floor gastroparesis  3 days full liquid diet and 1 day  clears  covid test 1/30 primary care, instructions sent to [facility]    ESOPHAGOGASTRODUODENOSCOPY N/A 7/1/2022    Procedure: ESOPHAGOGASTRODUODENOSCOPY (EGD);  Surgeon: Annamaria Mendoza MD;  [facility]  Service: Endoscopy;  Laterality: N/A;  2nd floor-gastroparesis  full liquid diet x3 days, clear liquid diet x1 day prior to procedure  fully vaccinated, instructions sent to [facility]  6/29-pt confirmed new arrival time-[facility]    HYSTERECTOMY  1990    FRANDY (AUB, Fibroids), ovaries remain    RIGHT HEART CATHETERIZATION Right 1/5/2021    Procedure: INSERTION, CATHETER, RIGHT HEART;  Surgeon: Aureliano Sy MD;  Location: [facility] CATH LAB;  Service: Cardiology;  Laterality: Right;    VARICOSE VEIN SURGERY         Allergies: Sulfa (sulfonamide antibiotics) and Tramadol    Current Outpatient Medications   Medication Sig    albuterol (VENTOLIN HFA) 90 mcg/actuation inhaler Inhale 2 puffs into the lungs every 4 (four) hours as needed for Wheezing. Rescue    carboxymethylcellulose sodium (REFRESH TEARS) 0.5 % Drop Apply 1-2 drops to every as needed    ergocalciferol (ERGOCALCIFEROL) 50,000 unit Cap Take 1 capsule (50,000 Units total) by mouth every 7 days.    ferrous sulfate 325 (65 FE) MG EC tablet Take 325 mg by mouth 2 (two) times daily.    flu vac 2022 [id number]-[id number],PF, (FLUAD QUAD 2022-23,65Y UP,,PF,) 60 mcg (15 mcg x 4)/0.5 mL Syrg Inject into the muscle.    multivitamin capsule Take 1 capsule by mouth once daily.    mycophenolate mofetil (CELLCEPT) 200 mg/mL SusR Take 5 mLs (1,000 mg total) by mouth 2 (two) times daily.    nabumetone (RELAFEN) 750 MG tablet Take 1 tablet (750 mg total) by mouth 2 (two) times daily as needed for Pain.    NIFEdipine (ADALAT CC) 90 MG TbSR TAKE 1 TABLET(90 MG) BY MOUTH EVERY DAY    NIFEdipine (PROCARDIA-XL) 30 MG (OSM) 24 hr tablet Take 1 tablet (30 mg total) by mouth once daily.    pravastatin (PRAVACHOL) 20 MG tablet TAKE 1 TABLET(20 MG) BY MOUTH EVERY  EVENING    pregabalin (LYRICA) 300 MG Cap Take 1 capsule (300 mg total) by mouth 2 (two) times daily.    PREVIDENT 5000 BOOSTER PLUS 1.1 % Pste SMARTSIG:To Teeth    PREVIDENT 5000 SENSITIVE 1.1-5 % Pste BRUSH FOR 2 MINUTES TWICE PER DAY    RABEprazole (ACIPHEX) 20 mg tablet Take 1 tablet (20 mg total) by mouth 2 (two) times daily.    sars-cov-2, covid-19, (MODERNA COVID-19) 50 mcg/0.25 ml injection (BOOSTER) Inject into the muscle.    sodium chloride 0.9% 0.9 % irrigation Irrigate with 2 mLs as directed daily as needed (wound cleaning).    tadalafil (ADCIRCA) 20 mg Tab Take 2 tablets (40 mg total) by mouth once daily.    tiZANidine (ZANAFLEX) 4 MG tablet Take 1 tablet (4 mg total) by mouth nightly as needed (muscle pain).     No current facility-administered medications for this visit.       Immunization History   Administered Date(s) Administered    COVID-19 MRNA, LN-S PF (MODERNA HALF 0.25 ML DOSE) 03/12/2021, 04/21/2022    COVID-19, MRNA, LN-S, PF (MODERNA FULL 0.5 ML DOSE) 02/09/2021, 09/21/2021    COVID-19, mRNA, LNP-S, bivalent booster, PF (Moderna Omicron) 10/13/2022    Influenza 11/02/2015, 09/21/2021    Influenza (FLUAD) - Quadrivalent - Adjuvanted - PF *Preferred* (65+) 09/11/2020, 09/12/2022    Influenza - High Dose - PF (65 years and older) 10/09/2017, 10/27/2018, 09/09/2019    Influenza - Quadrivalent - PF *Preferred* (6 months and older) 11/03/2006, 10/08/2007, 09/29/2009, 09/26/2014, 11/02/2015, 09/27/2016    Influenza Split 11/03/2006, 10/08/2007, 09/29/2009, 09/26/2014    Pneumococcal Conjugate - 13 Valent 01/16/2013, 01/16/2013    Pneumococcal Polysaccharide - 23 Valent 09/27/2016, 11/12/2021    Tdap 02/06/2019    Zoster 01/16/2013    Zoster Recombinant 11/30/2018, 02/12/2019     Family History:    Family History   Problem Relation Age of Onset    Breast cancer Mother     Hypertension Father     Breast cancer Sister     Diabetes Sister     Osteoarthritis Brother     Diabetes Brother     No Known  Problems Daughter     No Known Problems Daughter     No Known Problems Son     No Known Problems Son     Breast cancer Maternal Aunt     Melanoma Neg Hx     Colon cancer Neg Hx     Crohn's disease Neg Hx     Stomach cancer Neg Hx     Ulcerative colitis Neg Hx     Rectal cancer Neg Hx     Irritable bowel syndrome Neg Hx     Esophageal cancer Neg Hx     Celiac disease Neg Hx     Ovarian cancer Neg Hx     Liver cancer Neg Hx     Pancreatic cancer Neg Hx      Social History     Substance and Sexual Activity   Alcohol Use Yes    Comment: wine occasionally      Social History     Substance and Sexual Activity   Drug Use No      Social History     Socioeconomic History    Marital status:      Spouse name: Lewis    Number of children: 4   Occupational History    Occupation: -retired     Employer: [employer]     Comment: [employer]     Employer: RETIRED   Tobacco Use    Smoking status: Never    Smokeless tobacco: Never   Substance and Sexual Activity    Alcohol use: Yes     Comment: wine occasionally    Drug use: No    Sexual activity: Yes     Partners: Male     Birth control/protection: None   Other Topics Concern    Are you pregnant or think you may be? No    Breast-feeding No   Social History Narrative         Social Determinants of Health     Financial Resource Strain: Low Risk     Difficulty of Paying Living Expenses: Not very hard   Food Insecurity: No Food Insecurity    Worried About Running Out of Food in the Last Year: Never true    Ran Out of Food in the Last Year: Never true   Transportation Needs: No Transportation Needs    Lack of Transportation (Medical): No    Lack of Transportation (Non-Medical): No   Physical Activity: Inactive    Days of Exercise per Week: 0 days    Minutes of Exercise per Session: 0 min   Stress: Stress Concern Present    Feeling of Stress : To some extent   Social Connections: Moderately Isolated    Frequency of Communication with Friends and Family:  More than three times a week    Frequency of Social Gatherings with Friends and Family: Never    Attends Baptism Services: Never    Active Member of Clubs or Organizations: No    Attends Club or Organization Meetings: Patient refused    Marital Status:    Housing Stability: Low Risk     Unable to Pay for Housing in the Last Year: No    Number of Places Lived in the Last Year: 1    Unstable Housing in the Last Year: No     Review of Systems   Constitutional:  Negative for chills, diaphoresis, fever, malaise/fatigue and weight loss.   HENT:  Negative for congestion, ear discharge, ear pain, hearing loss, nosebleeds, sinus pain, sore throat and tinnitus.    Eyes:  Negative for blurred vision, double vision, photophobia, pain, discharge and redness.   Respiratory:  Positive for shortness of breath (heavy exertion only). Negative for cough, hemoptysis, sputum production, wheezing and stridor.    Cardiovascular:  Negative for chest pain, palpitations, orthopnea, claudication, leg swelling and PND.   Gastrointestinal:  Positive for heartburn. Negative for abdominal pain, blood in stool, constipation, diarrhea, melena, nausea and vomiting.   Genitourinary:  Negative for dysuria, flank pain, frequency, hematuria and urgency.   Musculoskeletal:  Negative for back pain, falls, joint pain, myalgias and neck pain.   Skin:  Negative for itching and rash.   Neurological:  Negative for dizziness, tingling, tremors, sensory change, speech change, focal weakness, seizures, loss of consciousness, weakness and headaches.   Endo/Heme/Allergies:  Negative for environmental allergies and polydipsia. Does not bruise/bleed easily.   Psychiatric/Behavioral:  Negative for depression, hallucinations, memory loss, substance abuse and suicidal ideas. The patient is not nervous/anxious and does not have insomnia.    Vitals  /63 (BP Location: Left arm, Patient Position: Sitting, BP Method: Medium (Automatic))   Pulse 73   Temp  "98.3 °F (36.8 °C) (Oral)   Resp 18   Ht 5' 5" (1.651 m)   Wt 70.1 kg (154 lb 8.7 oz)   SpO2 99% Comment: ROOM AIR  BMI 25.72 kg/m²   Physical Exam  Vitals and nursing note reviewed.   Constitutional:       General: She is not in acute distress.     Appearance: She is well-developed. She is not diaphoretic.   HENT:      Head: Normocephalic and atraumatic.      Nose: Nose normal.      Mouth/Throat:      Pharynx: No oropharyngeal exudate.   Eyes:      General: No scleral icterus.     Conjunctiva/sclera: Conjunctivae normal.      Pupils: Pupils are equal, round, and reactive to light.   Neck:      Thyroid: No thyromegaly.      Vascular: No JVD.      Trachea: No tracheal deviation.   Cardiovascular:      Rate and Rhythm: Normal rate and regular rhythm.      Heart sounds: Normal heart sounds. No murmur heard.    No friction rub. No gallop.   Pulmonary:      Effort: Pulmonary effort is normal. No respiratory distress.      Breath sounds: Rales present. No wheezing.   Abdominal:      General: Bowel sounds are normal. There is no distension.      Palpations: Abdomen is soft.      Tenderness: There is no abdominal tenderness.   Musculoskeletal:         General: No deformity. Normal range of motion.      Cervical back: Normal range of motion and neck supple.   Skin:     General: Skin is warm and dry.      Capillary Refill: Capillary refill takes less than 2 seconds.      Coloration: Skin is not pale.      Findings: No erythema or rash.      Comments: Skin tightening.  Digital ulcers and nailbed deformities.  Bilateral foot ulcers in dry intact dressing     Neurological:      Mental Status: She is alert and oriented to person, place, and time.      Cranial Nerves: No cranial nerve deficit.   Psychiatric:         Judgment: Judgment normal.       Labs:      Pulmonary Function Tests 10/7/2022 1/3/2022 5/21/2021 9/11/2020 3/12/2019 3/6/2018 4/8/2016   FVC 1.82 1.83 1.85 1.99 1.82 1.93 2.06   FEV1 1.49 1.44 1.48 1.57 1.43 1.52 " 1.65   TLC (liters) 3.2 - 3.18 2.23 3.13 3.39 3.74   DLCO (ml/mmHg sec) 11.89 - 14.11 13.84 15 16.9 15.4   FVC% 67 67 68 72 60 64 70   FEV1% 71 68 69 73 61 64 71   FEF 25-75 1.7 - 1.57 1.55 1.32 1.47 1.54   FEF 25-75% 95 - 85 84 58 64 66   TLC% 63 - 62 63 62 68 77   RV 1.33 - 1.24 1.15 1.21 1.27 1.68   RV% 63 - 59 55 61 64 89   DLCO% 54 - 64 62 82 112 84     6MW 10/7/2022 5/3/2022 7/6/2021 12/15/2020 10/23/2019 9/9/2019 9/4/2015   6MWT Status completed without stopping completed without stopping completed without stopping completed without stopping completed without stopping completed without stopping not completed   Patient Reported Dyspnea;Lightheadedness Dyspnea;Leg pain Dyspnea Dyspnea Dyspnea;Leg pain Dyspnea -   Was O2 used? No No No No No No No   6MW Distance walked (feet) 1400 1400 1400 1380 1362 1100 -   Distance walked (meters) 426.72 426.72 426.72 420.62 415.14 335.28 -   Did patient stop? No No No No No No -   Oxygen Saturation 98 99 98 95 99 98 99   Supplemental Oxygen Room Air Room Air Room Air Room Air Room Air Room Air Room Air   Heart Rate 85 71 66 65 76 63 67   Blood Pressure 126/61 115/54 119/58 130/57 133/80 146/67 124/65   Ju Dyspnea Rating  moderate moderate moderate moderate light light moderate   Oxygen Saturation 97 96 97 - 99 98 -   Supplemental Oxygen Room Air Room Air Room Air Room Air Room Air Room Air -   Heart Rate 108 88 97 94 92 80 -   Blood Pressure 138/63 138/63 135/63 139/64 136/60 129/58 -   Unable to obtain - - - Oxygen Saturation - - -   Ju Dyspnea Rating  somewhat heavy somewhat heavy somewhat heavy somewhat heavy moderate moderate -   Recovery Time (seconds) 39 130 145 124 78 180 -   Oxygen Saturation 97 98 99 99 99 99 -   Supplemental Oxygen Room Air Room Air Room Air Room Air Room Air Room Air -   Heart Rate 103 83 72 71 71 67 -       Imaging:  Results for orders placed during the hospital encounter of 12/22/20    CT Chest Without Contrast    Narrative  EXAMINATION:  CT  CHEST WITHOUT CONTRAST    CLINICAL HISTORY:  pulmonary hypertension; Pulmonary hypertension, unspecified    TECHNIQUE:  Using low dose technique the chest was surveyed from above the pulmonary apices through the posterior costophrenic angles.  Data was reconstructed for multiplanar images in axial, sagittal and coronal planes and for maximal intensity projection images in the axial plane.    All CT scans at this facility use dose modulation, iterative reconstruction and/or weight based dosing when appropriate to reduce radiation dose to as low as reasonably achievable.    Xray dose: DLP = 152.03 mGy-cm.    COMPARISON:  CT chest abdomen without contrast 03/13/2019.  CT chest without contrast 09/18/2015.    FINDINGS:  Base of Neck: No significant abnormality.    Aorta: Left-sided aortic arch with 3 arterial branches. The aorta maintains normal caliber, contour and course. There is mild calcification of the thoracic aorta or coronary arteries.    Heart/pericardium: Normal size.  No pericardial effusion or calcification.  Calcification of the aortic valve annulus.    Pulmonary vasculature: Pulmonary arteries distribute normally.  Pulmonary artery size is neither specific nor sensitive for normal or elevated pulmonary arterial pressures.    -There are four pulmonary veins.    Barbara/Mediastinum: No pathologic noelle enlargement.    Esophagus: The esophagus is mildly dilated with dependent fluid, similar to prior exam.    Upper Abdomen: No abnormality of the partially imaged upper abdomen.    Thoracic soft tissues: Normal. Both breasts are present.    Bones: No acute fracture. No suspicious lytic or sclerotic lesion.    Airways: Patent.    Lungs/pleura:    -Stable biapical scarring.    -Redemonstration of cystic changes and reticulation plus subsegmental ground-glass disease, most extensive in the lower lung zones.  Radiographic appearance is more typical for NSIP but could represent UIP in this patient with  scleroderma.    -There are stable scattered foci of tree-in-bud nodularities, for example within the superior segment of the right lower lobe (axial series 4, image 211).  There is associated dilatation of multiple adjacent small airways.    -0.4 cm solid pulmonary nodule in the apical segment of the right upper lobe (axial series 4, image 83), stable dating back to 2015.    -No pleural fluid or thickening.No pleural calcification. No pneumothorax.    Impression  1.  Overall stable appearing chronic interstitial lung disease consistent with patient's history of scleroderma.  Radiographic appearance is more typical for NSIP and UIP, noting that UIP is a more common disease.    2.  Previously characterized tree-in-bud opacity along the superior aspect of the right oblique fissure appears stable and may reflect chronic inflammatory process.    3.  0.4 cm solid right upper lobe pulmonary nodule, stable dating back to 09/18/2015.  Such stability favors benign etiology.    4.  Persistent fluid-filled esophagus as noted on multiple priors placing the patient at increased risk for aspiration.  This may be related to the patient's history of scleroderma; clinical considerations will determine if further assessment of esophageal motility is warranted.    Electronically signed by resident: Leobardo Arellano  Date:    12/22/2020  Time:    11:43    Electronically signed by: Maxine Del Cid MD  Date:    12/22/2020  Time:    14:23      Cardiodiagnostics:  Results for orders placed during the hospital encounter of 12/29/21    Echo    Interpretation Summary  · The left ventricle is normal in size with normal systolic function.  · The estimated ejection fraction is 63%.  · Normal left ventricular diastolic function.  · Normal right ventricular size with normal right ventricular systolic function.  · Mild pulmonic regurgitation.  · Normal central venous pressure (3 mmHg).  · The estimated PA systolic pressure is 32 mmHg.  · Trivial  posterior pericardial effusion.        Assessment:  1. ILD (interstitial lung disease)    2. Scleroderma with pulmonary involvement    3. Gastroesophageal reflux disease, unspecified whether esophagitis present      Plan:   Followed for SSc-ILD with PAH.  Currently on MMF and tolerating well.  FVC has been stable.  Slight decrease in DLCO recently however no desaturations on 6MWT.  Has known PH which is stable on Adcirca.  No OFEV due to her GI symptoms at baseline.  Continue to monitor on routine visits.  Continue to follow with GI for GERD/scleroderma esophagus.  Discussed elevation of the head of her bed to help with nocturnal symptoms.  Continue with PPI.  Continue Adcirca and PH follow-up  Follow-up in 6 months with repeat testing.      Roberta Leigh D.O.  Pulmonary/Critical Care and Lung Transplantation  [facility]

## 2022-11-08 NOTE — LETTER
November 9, 2022        Luis Ahuja  [address]  [facility] [address]  Phone: [phone]  Fax: [phone]             [facility]  [address]  [facility] [address]  Phone: [phone]   Patient: Yamel Shah   MR Number: [id number]   YOB: 1950   Date of Visit: 11/8/2022       Dear Dr. Luis Ahuja    Thank you for referring Yamel Shah to me for evaluation. Attached you will find relevant portions of my assessment and plan of care.    If you have questions, please do not hesitate to call me. I look forward to following Yamel Shah along with you.    Sincerely,    Roberta Leigh, DO    Enclosure    If you would like to receive this communication electronically, please contact [email or web address] or [phone] to request [employer] access.    [employer] is a tool which provides read-only access to select patient information with whom you have a relationship. Its easy to use and provides real time access to review your patients record including encounter summaries, notes, results, and demographic information.    If you feel you have received this communication in error or would no longer like to receive these types of communications, please e-mail [email or web address]

## 2022-11-08 NOTE — PROGRESS NOTES
[facility]   [facility] MULTIDISCIPLINARY CONFERENCE  PATIENT REVIEW FORM  ____________________________________________________________    CLINIC #: [id number]    DATE: 11/08/2022    DIAGNOSIS:     [] Achalsia       [x] Gastropariesis           [] Functional Dyspepsia   [] Chronic Diarrhea      [] Dysphagia   [] Pseudoachalasia       [x] GERD   [x] Hiatal Hernia       [x] Dysmotility   [] Dumping Syndrome  [] Cyclic Vomiting   [] IBS       [] Outlet Obstruction    [] Fecal Incontinence    [] Hirschsprung's Disease        PRESENTER:      [] Kelly    [] Tanner   [] Marcin        [x] Imelda  [] Tony       PATIENT SUMMARY:   71 year-old woman with scleroderma complicated by ILD, pHTN, Raynaud's, and scleroderma esophagus presents with medically refractory GERD with non-dysplastic Pagan's. Primary complaint is frequent breakthrough heartburn, nocturnal reflux, and occasional regurgitation. GP symptoms are well controlled with dietary modifications. Denies nausea, vomiting, gas bloat, or abdominal pain. Rare dysphagia. Rare early satiety. Weight is stable.    Work-up includes:  HREM 3/4/20: Low LES pressure with complete relaxation, absent contractility, hiatal hernia  pH impedence 3/6/30: [place] 133  GES 5/4/20: Retention of 25% at 4 hours  EGD 7/1/22: LA grade A esophagitis, nondysplastic Pagan's, benign-appearing stenosis, 2-cm hiatal hernia.    RECOMMENDATIONS:   Hiatal hernia repair, no fundoplication, with pyloromyotomy    CONSULT NEEDED:        [] Surgery    [] ENT    [] GI    [] Speech Pathology                IMAGING:       [] Esophagram     [] MBS    [] CT ABD/PELVIS       [] GES   [] MRI    [] UGI w/SBFT   [] SITZ MARKER      [] EKG    [] U/S    [] DEFOGRAM    PROCEDURES:    [] EGD  [] Impedance  [] MANOMETRY  [x] SURGICAL INTERVENTION      [] COLONOSCOPY    [] ERCP    [] EUS    [] ENTEROSCOPY

## 2022-11-21 ENCOUNTER — SPECIALTY PHARMACY (OUTPATIENT)
Dept: PHARMACY | Facility: CLINIC | Age: 72
End: 2022-11-21
Payer: MEDICARE

## 2022-11-21 NOTE — TELEPHONE ENCOUNTER
Specialty Pharmacy - Refill Coordination    Specialty Medication Orders Linked to Encounter      Flowsheet Row Most Recent Value   Medication #1 mycophenolate mofetil (CELLCEPT) 200 mg/mL SusR (Order#[id number], Rx#[id number])            Refill Questions - Documented Responses      Flowsheet Row Most Recent Value   Patient Availability and HIPAA Verification    Does patient want to proceed with activity? Yes   HIPAA/medical authority confirmed? Yes   Relationship to patient of person spoken to? Self   Refill Screening Questions    Would patient like to speak to a pharmacist? No   When does the patient need to receive the medication? 11/24/22   Refill Delivery Questions    How will the patient receive the medication? MEDRx   When does the patient need to receive the medication? 11/24/22   Shipping Address Home   Address in [facility] confirmed and updated if neccessary? Yes   Expected Copay ($) 15   Is the patient able to afford the medication copay? Yes   Payment Method zero copay   Days supply of Refill 48   Supplies needed? No supplies needed   Refill activity completed? Yes   Refill activity plan Refill scheduled   Shipment/Pickup Date: 11/21/22            Current Outpatient Medications   Medication Sig    albuterol (VENTOLIN HFA) 90 mcg/actuation inhaler Inhale 2 puffs into the lungs every 4 (four) hours as needed for Wheezing. Rescue    carboxymethylcellulose sodium (REFRESH TEARS) 0.5 % Drop Apply 1-2 drops to every as needed    ergocalciferol (ERGOCALCIFEROL) 50,000 unit Cap Take 1 capsule (50,000 Units total) by mouth every 7 days.    ferrous sulfate 325 (65 FE) MG EC tablet Take 325 mg by mouth 2 (two) times daily.    flu vac 2022 65up-yvmQG20S,PF, (FLUAD QUAD 2022-23,65Y UP,,PF,) 60 mcg (15 mcg x 4)/0.5 mL Syrg Inject into the muscle.    multivitamin capsule Take 1 capsule by mouth once daily.    mycophenolate mofetil (CELLCEPT) 200 mg/mL SusR Take 5 mLs (1,000 mg total) by mouth 2 (two) times  daily.    nabumetone (RELAFEN) 750 MG tablet Take 1 tablet (750 mg total) by mouth 2 (two) times daily as needed for Pain.    NIFEdipine (ADALAT CC) 90 MG TbSR TAKE 1 TABLET(90 MG) BY MOUTH EVERY DAY    NIFEdipine (PROCARDIA-XL) 30 MG (OSM) 24 hr tablet TAKE 1 TABLET(30 MG) BY MOUTH EVERY DAY    pravastatin (PRAVACHOL) 20 MG tablet TAKE 1 TABLET(20 MG) BY MOUTH EVERY EVENING    pregabalin (LYRICA) 300 MG Cap Take 1 capsule (300 mg total) by mouth 2 (two) times daily.    PREVIDENT 5000 BOOSTER PLUS 1.1 % Pste SMARTSIG:To Teeth    PREVIDENT 5000 SENSITIVE 1.1-5 % Pste BRUSH FOR 2 MINUTES TWICE PER DAY    RABEprazole (ACIPHEX) 20 mg tablet Take 1 tablet (20 mg total) by mouth 2 (two) times daily.    sars-cov-2, covid-19, (MODERNA COVID-19) 50 mcg/0.25 ml injection (BOOSTER) Inject into the muscle.    sodium chloride 0.9% 0.9 % irrigation Irrigate with 2 mLs as directed daily as needed (wound cleaning).    tadalafil (ADCIRCA) 20 mg Tab Take 2 tablets (40 mg total) by mouth once daily.    tiZANidine (ZANAFLEX) 4 MG tablet Take 1 tablet (4 mg total) by mouth nightly as needed (muscle pain).   Last reviewed on 11/8/2022  2:01 PM by Roberta Leigh, DO    Review of patient's allergies indicates:   Allergen Reactions    Sulfa (sulfonamide antibiotics)      Other reaction(s): Rash    Tramadol Itching    Last reviewed on  11/8/2022 2:01 PM by Roberta Leigh      Tasks added this encounter   No tasks added.   Tasks due within next 3 months   No tasks due.     Mitra Gaytan, PharmD  [facility]  [address] [place] [address]  Phone: [phone]  Fax: [phone]

## 2022-11-21 NOTE — TELEPHONE ENCOUNTER
Outgoing call to pt regarding refill. Fulfillment only has 2 bottles in stock to verify if pt would like the 2 bottles or wait til Wednesday delivery for the 3 bottles. LVM. From earlier refill set up, pt states she was leaving out of town Wednesday therefore requested delivery for tomorrow.  Pt did not callback in time to make the delivery cut off time. Sent pt the 2 bottles for pt to have than not. Fulfillment will also make a  notation on the paperwork as such.  Pt is not set up for refill calls therefore unable to adjust and pend out refill accordingly.

## 2022-11-23 ENCOUNTER — PATIENT MESSAGE (OUTPATIENT)
Dept: PAIN MEDICINE | Facility: CLINIC | Age: 72
End: 2022-11-23
Payer: MEDICARE

## 2022-11-23 ENCOUNTER — TELEPHONE (OUTPATIENT)
Dept: PAIN MEDICINE | Facility: CLINIC | Age: 72
End: 2022-11-23
Payer: MEDICARE

## 2022-11-23 NOTE — TELEPHONE ENCOUNTER
Patient was contacted staff left a message on VM informing patient that her appt 11/28 would be cancelled due ot the provider being unavailable. Staff informed patient that she contact the office to reschedule.

## 2022-12-02 ENCOUNTER — PATIENT MESSAGE (OUTPATIENT)
Dept: ADMINISTRATIVE | Facility: OTHER | Age: 72
End: 2022-12-02
Payer: MEDICARE

## 2022-12-02 ENCOUNTER — TELEPHONE (OUTPATIENT)
Dept: TRANSPLANT | Facility: CLINIC | Age: 72
End: 2022-12-02
Payer: MEDICARE

## 2022-12-02 ENCOUNTER — CLINICAL SUPPORT (OUTPATIENT)
Dept: TRANSPLANT | Facility: CLINIC | Age: 72
End: 2022-12-02
Payer: MEDICARE

## 2022-12-02 ENCOUNTER — PATIENT MESSAGE (OUTPATIENT)
Dept: TRANSPLANT | Facility: CLINIC | Age: 72
End: 2022-12-02

## 2022-12-02 ENCOUNTER — HOSPITAL ENCOUNTER (OUTPATIENT)
Dept: RADIOLOGY | Facility: HOSPITAL | Age: 72
Discharge: HOME | End: 2022-12-02
Attending: INTERNAL MEDICINE
Payer: MEDICARE

## 2022-12-02 ENCOUNTER — TELEPHONE (OUTPATIENT)
Dept: TRANSPLANT | Facility: CLINIC | Age: 72
End: 2022-12-02

## 2022-12-02 ENCOUNTER — PATIENT MESSAGE (OUTPATIENT)
Dept: RHEUMATOLOGY | Facility: CLINIC | Age: 72
End: 2022-12-02
Payer: MEDICARE

## 2022-12-02 VITALS
SYSTOLIC BLOOD PRESSURE: 145 MMHG | HEART RATE: 68 BPM | OXYGEN SATURATION: 99 % | TEMPERATURE: 98 F | DIASTOLIC BLOOD PRESSURE: 67 MMHG | RESPIRATION RATE: 18 BRPM

## 2022-12-02 DIAGNOSIS — J84.9 ILD (INTERSTITIAL LUNG DISEASE): Primary | ICD-10-CM

## 2022-12-02 DIAGNOSIS — J18.9 SYMPTOMS OF PNEUMONIA: ICD-10-CM

## 2022-12-02 DIAGNOSIS — M34.9 SCLERODERMA WITH PULMONARY INVOLVEMENT: ICD-10-CM

## 2022-12-02 DIAGNOSIS — J84.9 ILD (INTERSTITIAL LUNG DISEASE): ICD-10-CM

## 2022-12-02 DIAGNOSIS — J06.9 URTI (ACUTE UPPER RESPIRATORY INFECTION): Primary | ICD-10-CM

## 2022-12-02 LAB
ADENOVIRUS: NOT DETECTED
BORDETELLA PARAPERTUSSIS (IS1001): NOT DETECTED
BORDETELLA PERTUSSIS (PTXP): NOT DETECTED
CHLAMYDIA PNEUMONIAE: NOT DETECTED
CORONAVIRUS 229E, COMMON COLD VIRUS: NOT DETECTED
CORONAVIRUS HKU1, COMMON COLD VIRUS: NOT DETECTED
CORONAVIRUS NL63, COMMON COLD VIRUS: NOT DETECTED
CORONAVIRUS OC43, COMMON COLD VIRUS: NOT DETECTED
FLUBV RNA NPH QL NAA+NON-PROBE: NOT DETECTED
HPIV1 RNA NPH QL NAA+NON-PROBE: NOT DETECTED
HPIV2 RNA NPH QL NAA+NON-PROBE: NOT DETECTED
HPIV3 RNA NPH QL NAA+NON-PROBE: NOT DETECTED
HPIV4 RNA NPH QL NAA+NON-PROBE: NOT DETECTED
HUMAN METAPNEUMOVIRUS: NOT DETECTED
INFLUENZA A (SUBTYPES H1,H1-2009,H3): NOT DETECTED
MYCOPLASMA PNEUMONIAE: NOT DETECTED
RESPIRATORY INFECTION PANEL SOURCE: ABNORMAL
RSV RNA NPH QL NAA+NON-PROBE: NOT DETECTED
RV+EV RNA NPH QL NAA+NON-PROBE: DETECTED
SARS-COV-2 RNA RESP QL NAA+PROBE: NOT DETECTED

## 2022-12-02 PROCEDURE — 99999 PR PBB SHADOW E&M-EST. PATIENT-LVL II: ICD-10-PCS | Mod: PBBFAC,TXP,,

## 2022-12-02 PROCEDURE — 99999 PR PBB SHADOW E&M-EST. PATIENT-LVL II: CPT | Mod: PBBFAC,TXP,,

## 2022-12-02 PROCEDURE — 87633 RESP VIRUS 12-25 TARGETS: CPT | Mod: TXP | Performed by: INTERNAL MEDICINE

## 2022-12-02 PROCEDURE — 71046 XR CHEST PA AND LATERAL: ICD-10-PCS | Mod: 26,NTX,, | Performed by: RADIOLOGY

## 2022-12-02 PROCEDURE — 99212 OFFICE O/P EST SF 10 MIN: CPT | Mod: PBBFAC,25,NTX

## 2022-12-02 PROCEDURE — 71046 X-RAY EXAM CHEST 2 VIEWS: CPT | Mod: 26,NTX,, | Performed by: RADIOLOGY

## 2022-12-02 PROCEDURE — 71046 X-RAY EXAM CHEST 2 VIEWS: CPT | Mod: TC,NTX

## 2022-12-02 RX ORDER — AMOXICILLIN AND CLAVULANATE POTASSIUM 500; 125 MG/1; MG/1
1 TABLET, FILM COATED ORAL 2 TIMES DAILY
Qty: 14 TABLET | Refills: 0 | Status: SHIPPED | OUTPATIENT
Start: 2022-12-02 | End: 2022-12-02

## 2022-12-02 RX ORDER — AMOXICILLIN AND CLAVULANATE POTASSIUM 250; 62.5 MG/5ML; MG/5ML
500 POWDER, FOR SUSPENSION ORAL 2 TIMES DAILY
Qty: 140 ML | Refills: 0 | Status: SHIPPED | OUTPATIENT
Start: 2022-12-02 | End: 2022-12-09

## 2022-12-02 NOTE — TELEPHONE ENCOUNTER
Liquid suspension requested by patient. Notified provider. Cannot swallow pills.  ----- Message from Jaclyn Arora sent at 12/2/2022  3:27 PM CST -----  Regarding: returning call  Contact: Contact  [phone]  PT returning call from Johanna requesting call back    Contact  [phone]

## 2022-12-02 NOTE — TELEPHONE ENCOUNTER
Patient presented to clinic without appointment and 'symptoms of pneumonia'. Dr. Leigh is not available  to see patient but requested Resp Inf. Panel and CXR PA and LAT. She states she will follow up with patient once x-ray results today. Patient to be seen in clinic for nasal swab.

## 2022-12-03 ENCOUNTER — TELEPHONE (OUTPATIENT)
Dept: TRANSPLANT | Facility: CLINIC | Age: 72
End: 2022-12-03
Payer: MEDICARE

## 2022-12-03 NOTE — TELEPHONE ENCOUNTER
Advanced lung disease patient follow up.  ----- Message from Roberta Leigh DO sent at 12/3/2022 12:21 PM CST -----  I sent her a message last night.  Just supportive care.  thx  ----- Message -----  From: Johanna Fernandez  Sent: 12/3/2022   9:06 AM CST  To: Roberta Leigh DO    Please advise if any action needed.  Thank you.

## 2022-12-05 ENCOUNTER — TELEPHONE (OUTPATIENT)
Dept: INTERNAL MEDICINE | Facility: CLINIC | Age: 72
End: 2022-12-05
Payer: MEDICARE

## 2022-12-05 ENCOUNTER — TELEPHONE (OUTPATIENT)
Dept: TRANSPLANT | Facility: CLINIC | Age: 72
End: 2022-12-05
Payer: MEDICARE

## 2022-12-05 RX ORDER — ALBUTEROL SULFATE 90 UG/1
2 AEROSOL, METERED RESPIRATORY (INHALATION) EVERY 4 HOURS PRN
Qty: 18 G | Refills: 3 | Status: SHIPPED | OUTPATIENT
Start: 2022-12-05

## 2022-12-05 NOTE — TELEPHONE ENCOUNTER
Lov 10/17/22     Cough, sore throat and runny nose.    Got her voicemail.  I left msg needs appt to be evaluated with someone since dr sosa is booked.  Not sure if she tried over the counter meds already.  I suggested she mask around others till she can see someone to be evaluated.  Urgent care is an option.

## 2022-12-05 NOTE — TELEPHONE ENCOUNTER
Attempted to reach patient by phone, left voicemail notifying of attempt to follow up, to verify if patient has started antibiotics prescribed by Dr. Leigh. Patient reminded of discussion during nurse visit on Friday. To follow up with supportive care: hydration, OTC cough medication, anti-histamine (caution with those that may raise blood pressure). Phone number provided.

## 2022-12-05 NOTE — TELEPHONE ENCOUNTER
No new care gaps identified.  [facility] Embedded Care Gaps. Reference number: [id number]. 12/05/2022   4:14:05 PM CST

## 2022-12-05 NOTE — TELEPHONE ENCOUNTER
----- Message from Estee Guillen LPN sent at 12/5/2022  9:48 AM CST -----  Contact: pt    ----- Message -----  From: Greg Palencia  Sent: 12/5/2022   7:40 AM CST  To: [facility] Staff    .Type:  Sooner Apoointment Request    Caller is requesting a sooner appointment.  Caller declined first available appointment listed below.  Caller will not accept being placed on the waitlist and is requesting a message be sent to doctor.  Name of Caller:pt  When is the first available appointment?  Symptoms:Coughing, sore throat, runny nose  Would the patient rather a call back or a response via [facility]?  Call back  Best Call Back Number:[phone]  Additional Information:

## 2022-12-07 ENCOUNTER — TELEPHONE (OUTPATIENT)
Dept: PAIN MEDICINE | Facility: CLINIC | Age: 72
End: 2022-12-07
Payer: MEDICARE

## 2022-12-08 ENCOUNTER — TELEPHONE (OUTPATIENT)
Dept: PAIN MEDICINE | Facility: CLINIC | Age: 72
End: 2022-12-08
Payer: MEDICARE

## 2022-12-08 NOTE — TELEPHONE ENCOUNTER
----- Message from Cady Acosta sent at 12/8/2022 11:32 AM CST -----  Regarding: Running late  Contact: YAMEL SHAH [[id number]]  Name of Who is Calling: Mrs. Yamel Shah          What is the request in detail: Pt she is running late but she is in building and her way up.   Please advise           Can the clinic reply by [facility]: No           What Number to Call Back if not in [facility]:[phone]

## 2022-12-08 NOTE — TELEPHONE ENCOUNTER
Staff lvm in regards to message that pt has a sury period of 15 minutes and it past that time and we have to speak with Viktoriya ardon Np if she is able to see patient.

## 2022-12-15 ENCOUNTER — LAB VISIT (OUTPATIENT)
Dept: LAB | Facility: HOSPITAL | Age: 72
End: 2022-12-15
Attending: STUDENT IN AN ORGANIZED HEALTH CARE EDUCATION/TRAINING PROGRAM
Payer: MEDICARE

## 2022-12-15 DIAGNOSIS — M34.9 SCLERODERMA: ICD-10-CM

## 2022-12-15 LAB
ALBUMIN SERPL BCP-MCNC: 3.4 G/DL (ref 3.5–5.2)
ALP SERPL-CCNC: 108 U/L (ref 55–135)
ALT SERPL W/O P-5'-P-CCNC: 9 U/L (ref 10–44)
ANION GAP SERPL CALC-SCNC: 8 MMOL/L (ref 8–16)
AST SERPL-CCNC: 18 U/L (ref 10–40)
BASOPHILS # BLD AUTO: 0.03 K/UL (ref 0–0.2)
BASOPHILS NFR BLD: 0.6 % (ref 0–1.9)
BILIRUB SERPL-MCNC: 0.3 MG/DL (ref 0.1–1)
BUN SERPL-MCNC: 15 MG/DL (ref 8–23)
CALCIUM SERPL-MCNC: 8.9 MG/DL (ref 8.7–10.5)
CHLORIDE SERPL-SCNC: 108 MMOL/L (ref 95–110)
CO2 SERPL-SCNC: 24 MMOL/L (ref 23–29)
CREAT SERPL-MCNC: 0.7 MG/DL (ref 0.5–1.4)
CRP SERPL-MCNC: 24.4 MG/L (ref 0–8.2)
DIFFERENTIAL METHOD: ABNORMAL
EOSINOPHIL # BLD AUTO: 0.1 K/UL (ref 0–0.5)
EOSINOPHIL NFR BLD: 1.4 % (ref 0–8)
ERYTHROCYTE [DISTWIDTH] IN BLOOD BY AUTOMATED COUNT: 15.6 % (ref 11.5–14.5)
ERYTHROCYTE [SEDIMENTATION RATE] IN BLOOD BY PHOTOMETRIC METHOD: 40 MM/HR (ref 0–36)
EST. GFR  (NO RACE VARIABLE): >60 ML/MIN/1.73 M^2
GLUCOSE SERPL-MCNC: 89 MG/DL (ref 70–110)
HCT VFR BLD AUTO: 36.8 % (ref 37–48.5)
HGB BLD-MCNC: 12.6 G/DL (ref 12–16)
IMM GRANULOCYTES # BLD AUTO: 0.02 K/UL (ref 0–0.04)
IMM GRANULOCYTES NFR BLD AUTO: 0.4 % (ref 0–0.5)
LYMPHOCYTES # BLD AUTO: 1.4 K/UL (ref 1–4.8)
LYMPHOCYTES NFR BLD: 27 % (ref 18–48)
MCH RBC QN AUTO: 33.2 PG (ref 27–31)
MCHC RBC AUTO-ENTMCNC: 34.2 G/DL (ref 32–36)
MCV RBC AUTO: 97 FL (ref 82–98)
MONOCYTES # BLD AUTO: 0.4 K/UL (ref 0.3–1)
MONOCYTES NFR BLD: 6.9 % (ref 4–15)
NEUTROPHILS # BLD AUTO: 3.2 K/UL (ref 1.8–7.7)
NEUTROPHILS NFR BLD: 63.7 % (ref 38–73)
NRBC BLD-RTO: 0 /100 WBC
PLATELET # BLD AUTO: 225 K/UL (ref 150–450)
PMV BLD AUTO: 12.2 FL (ref 9.2–12.9)
POTASSIUM SERPL-SCNC: 4 MMOL/L (ref 3.5–5.1)
PROT SERPL-MCNC: 8 G/DL (ref 6–8.4)
RBC # BLD AUTO: 3.79 M/UL (ref 4–5.4)
SODIUM SERPL-SCNC: 140 MMOL/L (ref 136–145)
WBC # BLD AUTO: 5.04 K/UL (ref 3.9–12.7)

## 2022-12-15 PROCEDURE — 85652 RBC SED RATE AUTOMATED: CPT | Mod: NTX | Performed by: STUDENT IN AN ORGANIZED HEALTH CARE EDUCATION/TRAINING PROGRAM

## 2022-12-15 PROCEDURE — 36415 COLL VENOUS BLD VENIPUNCTURE: CPT | Mod: PO,TXP | Performed by: STUDENT IN AN ORGANIZED HEALTH CARE EDUCATION/TRAINING PROGRAM

## 2022-12-15 PROCEDURE — 80053 COMPREHEN METABOLIC PANEL: CPT | Mod: NTX | Performed by: STUDENT IN AN ORGANIZED HEALTH CARE EDUCATION/TRAINING PROGRAM

## 2022-12-15 PROCEDURE — 86140 C-REACTIVE PROTEIN: CPT | Mod: NTX | Performed by: STUDENT IN AN ORGANIZED HEALTH CARE EDUCATION/TRAINING PROGRAM

## 2022-12-15 PROCEDURE — 85025 COMPLETE CBC W/AUTO DIFF WBC: CPT | Mod: NTX | Performed by: STUDENT IN AN ORGANIZED HEALTH CARE EDUCATION/TRAINING PROGRAM

## 2022-12-15 NOTE — PROGRESS NOTES
Subjective:       Patient ID: Yamel Shah is a 72 y.o. female.    Chief Complaint: Follow-up    HPI  The patient presents for follow-up of medical conditions which include hypertension, scleroderma, GERD, pulmonary hypertension, interstitial lung disease, chronic foot ulcers.  The patient reports her chronic reflux is not totally relieved with medication.  She anticipates having hiatal hernia repair surgery in January 2023.  She is following a Mediterranean diet.      Lower extremity skin ulcers are slowly improving.  The patient is performing wound care herself at home.  She is not interested in returning to the wound care clinic.  Occasional paresthesias are noted in the lower extremities.  She has not experienced any falls.    She denies having exertional chest pain or dyspnea.  She is not experiencing chronic cough or wheezing.  Her exercise tolerance for her usual activities has remained stable.    Review of Systems   Constitutional:  Negative for activity change, appetite change, fatigue and unexpected weight change.   Eyes:  Negative for visual disturbance.   Respiratory:  Negative for cough and shortness of breath.    Cardiovascular:  Negative for chest pain, palpitations and leg swelling.   Gastrointestinal:  Negative for abdominal pain, blood in stool, constipation and diarrhea.   Genitourinary:  Negative for dysuria and hematuria.   Musculoskeletal:  Positive for arthralgias. Negative for neck pain and neck stiffness.   Integumentary:  Positive for wound (Bilateral foot ulcers are slowly healing.). Negative for rash.   Neurological:  Negative for dizziness, syncope and headaches.   Psychiatric/Behavioral:  Negative for sleep disturbance.           Physical Exam  Vitals and nursing note reviewed.   Constitutional:       General: She is not in acute distress.     Appearance: Normal appearance. She is well-developed.   HENT:      Head: Normocephalic and atraumatic.   Eyes:      General: No  scleral icterus.     Extraocular Movements: Extraocular movements intact.      Conjunctiva/sclera: Conjunctivae normal.   Neck:      Thyroid: No thyromegaly.      Vascular: No JVD.   Cardiovascular:      Rate and Rhythm: Normal rate and regular rhythm.      Heart sounds: Normal heart sounds. No murmur heard.    No friction rub. No gallop.   Pulmonary:      Effort: Pulmonary effort is normal. No respiratory distress.      Breath sounds: No wheezing or rales.      Comments: Bibasilar crackles are audible.    Abdominal:      General: Bowel sounds are normal.      Palpations: Abdomen is soft. There is no mass.      Tenderness: There is no abdominal tenderness.   Musculoskeletal:         General: No tenderness. Normal range of motion.      Cervical back: Normal range of motion and neck supple.      Right lower leg: Edema present.      Left lower leg: Edema present.   Lymphadenopathy:      Cervical: No cervical adenopathy.   Skin:     General: Skin is warm and dry.      Findings: No rash.      Comments: Lower extremity dressings are intact on both feet.     Neurological:      Mental Status: She is alert and oriented to person, place, and time.   Psychiatric:         Mood and Affect: Mood normal.         Behavior: Behavior normal.             Assessment & Plan:      Yamel was seen today for follow-up.  Current therapy will be continued.  The patient remains medically stable on current therapy.     Diagnoses and all orders for this visit:    Essential hypertension    Iron deficiency anemia, unspecified iron deficiency anemia type    Gastroesophageal reflux disease, unspecified whether esophagitis present    ILD (interstitial lung disease)    Pulmonary hypertension associated with systemic disorder    Venous insufficiency of both lower extremities    Scleroderma with pulmonary involvement    Skin ulcers of both feet         No follow-ups on file.     Aly Rand MD

## 2022-12-19 ENCOUNTER — TELEPHONE (OUTPATIENT)
Dept: SURGERY | Facility: CLINIC | Age: 72
End: 2022-12-19
Payer: MEDICARE

## 2022-12-19 ENCOUNTER — OFFICE VISIT (OUTPATIENT)
Dept: RHEUMATOLOGY | Facility: CLINIC | Age: 72
End: 2022-12-19
Payer: MEDICARE

## 2022-12-19 VITALS
BODY MASS INDEX: 26.96 KG/M2 | HEIGHT: 65 IN | SYSTOLIC BLOOD PRESSURE: 120 MMHG | WEIGHT: 161.81 LBS | HEART RATE: 86 BPM | DIASTOLIC BLOOD PRESSURE: 60 MMHG

## 2022-12-19 DIAGNOSIS — M34.9 SCLERODERMA: Primary | ICD-10-CM

## 2022-12-19 DIAGNOSIS — M85.80 OSTEOPENIA, UNSPECIFIED LOCATION: ICD-10-CM

## 2022-12-19 DIAGNOSIS — J84.9 ILD (INTERSTITIAL LUNG DISEASE): ICD-10-CM

## 2022-12-19 DIAGNOSIS — I87.2 VENOUS INSUFFICIENCY OF BOTH LOWER EXTREMITIES: ICD-10-CM

## 2022-12-19 DIAGNOSIS — K21.9 GASTROESOPHAGEAL REFLUX DISEASE, UNSPECIFIED WHETHER ESOPHAGITIS PRESENT: ICD-10-CM

## 2022-12-19 DIAGNOSIS — I27.20 PULMONARY HYPERTENSION: ICD-10-CM

## 2022-12-19 PROCEDURE — 99999 PR PBB SHADOW E&M-EST. PATIENT-LVL V: ICD-10-PCS | Mod: PBBFAC,GC,TXP, | Performed by: STUDENT IN AN ORGANIZED HEALTH CARE EDUCATION/TRAINING PROGRAM

## 2022-12-19 PROCEDURE — 99215 OFFICE O/P EST HI 40 MIN: CPT | Mod: PBBFAC,TXP | Performed by: STUDENT IN AN ORGANIZED HEALTH CARE EDUCATION/TRAINING PROGRAM

## 2022-12-19 PROCEDURE — 99214 PR OFFICE/OUTPT VISIT, EST, LEVL IV, 30-39 MIN: ICD-10-PCS | Mod: S$PBB,GC,NTX, | Performed by: STUDENT IN AN ORGANIZED HEALTH CARE EDUCATION/TRAINING PROGRAM

## 2022-12-19 PROCEDURE — 99214 OFFICE O/P EST MOD 30 MIN: CPT | Mod: S$PBB,GC,NTX, | Performed by: STUDENT IN AN ORGANIZED HEALTH CARE EDUCATION/TRAINING PROGRAM

## 2022-12-19 PROCEDURE — 99999 PR PBB SHADOW E&M-EST. PATIENT-LVL V: CPT | Mod: PBBFAC,GC,TXP, | Performed by: STUDENT IN AN ORGANIZED HEALTH CARE EDUCATION/TRAINING PROGRAM

## 2022-12-19 NOTE — LETTER
December 19, 2022          No Recipients             [id number]  [address]  [facility] [address]  Phone: [phone]  Fax: [phone]   Patient: Yamel Shah   MR Number: [id number]   YOB: 1950   Date of Visit: 12/19/2022       Dear Dr. De La Rosa Recipients:    Thank you for referring Yamel Shah to me for evaluation. Below are the relevant portions of my assessment and plan of care.     1. Scleroderma    2. ILD (interstitial lung disease)    3. Pulmonary hypertension    4. Osteopenia, unspecified location    5. Gastroesophageal reflux disease, unspecified whether esophagitis present    6. Venous insufficiency of both lower extremities         Problem List Items Addressed This Visit          Pulmonary    Pulmonary hypertension    ILD (interstitial lung disease)       Cardiac/Vascular    Venous insufficiency of both lower extremities       GI    GERD (gastroesophageal reflux disease)       Orthopedic    Osteopenia     Other Visit Diagnoses       Scleroderma    -  Primary            - was previously holding MMF due to URI, instructed to restart and continue MMF 1000mg BID (liquid suspension) today  - Cardiac: pHTN secondary to SSc, last echo 12/2021 with hgmm 32, EF 63%, and trivial posterior cardiac effusion; will order today  - Pulm: ILD, Last CT chest with stable from 2020. PFT from 1/202022 without DLCO, due to Dr. Leigh in 11/2022; will send message to Dr. Shapley to see if candidate for OFEV  2018: FVC 1.93/ FEV1 1.52/ FVC: FEV1 79/ DLCO n/a  2019: FVC 1.98/ FEV1 1.59/ FVC: FEV1 80/ DLCO 12.7  2020: FVC 1.92/ FEV1 1.57/ FVC: FEV1 82/ DLCO 16.03  2022: FVC 1.83/ FEV1 1.44/ FVC: FEV1 78/ DLCO n/a  - Vascular: Chronic bilateral Foot ulcers; seen last by vascular surgery Dr. Claudio in 12/2021 and noted to ulcers are likely due to chronic venous insufficiency; will schedule follow up with Dr. Claudio to see if additional intervention is  needed  - instructed to hold MMF 1 week before hernia repair surgery and restart 1 week after  - up to date on covid series and flu vaccine    Follow-up in 3 months with labs before visit    Patient seen and evaluated with Dr. Ahuja          If you have questions, please do not hesitate to call me. I look forward to following Yamel along with you.    Sincerely,      Jessica Wilson MD           CC    No Recipients

## 2022-12-19 NOTE — PROGRESS NOTES
Subjective:       Patient ID: Yamel Shah is a 72 y.o. female.    Chief Complaint: Systemic scleroderma disease    HPI     Yamel Shah is a 72 y.o. 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 She was originally diagnosed in 1983 and has been seeing Dr. Ahuja for over 10 years.( +SSA, +SCL-70, -RNAP3, ILD). She has been on MMF since 2019. Used to follow with Dr. Leigh in pulmonology but now follows with Dr. Merlos. Additionally, she has chronic ulcers in her feet and sees podiatry and vascular. She is s/p laser venous ablation on right foot in 12/2020 by Dr. Claudio. She follows with Dr. Martins in Cardiology and had a right heart cath 1/2021 which showed normal PA pressures. She takes tadalafil. Her last echo was in 9/2020 with indeteriminate diasystolic dysfunction and PA 40 mmhg.     Interval History:  12/19/22: Reports that she has been having a upper respiratory infection. She reports a lingering cough since thanksgiving and she was given an antiboitic. She does report improvement since this time but does still a productive cough at night. She endorses mild wheezing. She has been holding her cellcept for the past few weeks. She states that the wound on her feet are improving. She is scheduled for a hernia repair in January and asks for instructions of how to hold her MMF at that time. She denies fevers, diarrhea, SOB, joint pain.     9/12/22: she states that she is doing well. She denies fever, nights sweats, joint pain, rash, chest pain, or SOB. She does states that she notices more hot-flashes within the last week but no fever. She is compliant with MMF.    4/11/22: She comes today for follow-up.  She was previously taking CellCept but discontinued this 3 weeks ago due to concern that it was causing her to have frequent chills.   Of note, she denies fever, chest pain, shortness of breath, joint pains, or joint swelling. CBC from 4/7 showed moderate anemia (hgb 12.3-> 10.6), otherwise within normal limits.  She is scheduled to see GI after this appointment today.  She denies any bloody bowel movements or any dark tarry stools.  She also denies abdominal pain.  She is concerned about her nonhealing ulcers but states that these do appear improved from last appointment.  She was seen by Wound care in [place] last year who recommended possible skin grafts but she declines this at this time.  She is also scheduled to see pulmonology on 04/19/2022.      10/27/21: Doing well today, does report persistent pain in her feet due to her chronic ulcers. She will see podiatry later today and states that she thinks that they are healing slowly. She denies joint pain, joint swelling, oral/nasal ulcers, fevers, or chest pain. She has occasional shortness of breath when she goes up stairs but states that this is stable. She was last seen by Dr. Merlos in pulmonology on 7/2021 where her PFTs were noted to be stable. Prior to this, seen by Dr. Leigh in 5/2020, stated that not a candidate for antifibrotic at that time. She will see podiatry later today for the chronic ulcers on her feet and states that these appear to be healing slowly. She last saw vascular surgery in 12/2020. Her last echo was in 9/2020 with indeteriminate diasystolic dysfunction and PA 40 mmhg. She had a right heart cath 1/2021 which showed normal PA pressures. She denies joint pain, joint swelling, oral/nasal ulcers, fevers, or chest pain.    1/3/21: she reports that she is doing well and that her ulcers are unchanged from prior visit. She was seen by Dr. Claudio in vascular surgery who stated that the ulcers due to venous insufficiency and not arterial, requiring podiatry and wound care. ABIs were normal from 12/2021. Last echo in 12/29/21 showed normal EF with PA pressure 32 hgmm (prior in  "2020 was 40 hgmm). She denies increased SOB or chest pain. Got PneumoVax 23 in 11/2021. She has not had an issue with Raynaud's this season. She continues on nifedipine and tadalafil. Reports receiving COVID booster since last appointment but did not hold MMF for a week after this.    Treatment Regimen:   She is currently on MMF 1000mg BID (liquid suspension) (she reports that she has held this for the past 3 weeks as of 4/11/22)    Review of Systems   Constitutional:  Negative for fever and unexpected weight change.   HENT:  Positive for trouble swallowing. Negative for mouth sores.    Eyes:  Negative for redness.   Respiratory:  Negative for cough and shortness of breath.    Cardiovascular:  Negative for chest pain.   Gastrointestinal:  Negative for constipation and diarrhea.   Genitourinary:  Negative for dysuria and genital sores.   Musculoskeletal:  Negative for joint swelling.   Skin:  Positive for rash.   Neurological:  Negative for headaches.   Hematological:  Bruises/bleeds easily.       Objective:   /60   Pulse 86   Ht 5' 5" (1.651 m)   Wt 73.4 kg (161 lb 13.1 oz)   BMI 26.93 kg/m²      Physical Exam   Constitutional: She is oriented to person, place, and time. No distress.   HENT:   Head: Normocephalic and atraumatic.   Right Ear: External ear normal.   Left Ear: External ear normal.   Nose: Nose normal.   Mouth/Throat: Oropharynx is clear and moist.   Eyes: Conjunctivae are normal. Right eye exhibits no discharge. Left eye exhibits no discharge. No scleral icterus.   Neck: No thyromegaly present.   Cardiovascular: Normal rate, regular rhythm and normal heart sounds.   No murmur heard.  Pulmonary/Chest: Effort normal and breath sounds normal. No respiratory distress. She has no wheezes. She exhibits no tenderness.   Crackles b/l stable from prior exam   Abdominal: Soft. Bowel sounds are normal. She exhibits no distension. There is no abdominal tenderness.   Musculoskeletal:         General: " Deformity present. No tenderness. Normal range of motion.      Comments: Skin tightness  Claw hand deformity, flexion contractures b/l  Skin score: 32  Chronic LE ulcers.   Neurological: She is alert and oriented to person, place, and time.   Skin: Skin is warm and dry. No rash noted. She is not diaphoretic. No erythema.   Psychiatric: Mood and affect normal.   Vitals reviewed.                  Modified Skin Score:  Skin score head= 1  Chest= 0  abdomen=0         Right upper arm=2  Left upper arm=2   Right lower arm=1 Left lower arm: 2  Right hand=3 Left hand=3  R Fingers= 3        L Fingers=3  Right upper leg= 2  Left upper leg=2  Right lower leg= 1  Left lower leg= 1  Right foot= 3            Left foot= 3    Modified Skin Score: 32      Chart Review:  CBC: hgb 12.6  CMP: done  ESR: 40  CRP: 24.4    Assessment:       1. Scleroderma    2. ILD (interstitial lung disease)    3. Pulmonary hypertension    4. Osteopenia, unspecified location    5. Gastroesophageal reflux disease, unspecified whether esophagitis present    6. Venous insufficiency of both lower extremities          Yamel Shah is a 70 y.o. female with SSc (+SSA, +SCL-70, -RNAP3) with ILD, diastolic dysfunction, and GERD with Pagan's esophagus who presents for follow up for disease management. She was originally diagnosed in 1983 and has been seeing Dr. Ahuja for over 10 years.( +SSA, +SCL-70, -RNAP3, ILD). She has been on MMF since 2019. Used to follow with Dr. Leigh in pulmonology. She follows with Dr. Martins in Cardiology and had a right heart cath 1/2021 which showed normal PA pressures. She takes tadalafil. Her last echo was in 9/2020 with indeteriminate diasystolic dysfunction and PA 40 mmhg. Additionally, she has chronic ulcers in her feet. She is s/p laser venous ablation on right foot in 12/2020 by Dr. Claudio, recently seen in 12/2021 where ABIs were normal without signs of PVD. Will refer back to vascular to see if additional  intervention is needed. She was holding her MMF for a URI for the past 3 weeks but has finished antibiotics. Recommended restarting back on today.     Plan:       Problem List Items Addressed This Visit          Pulmonary    Pulmonary hypertension    ILD (interstitial lung disease)       Cardiac/Vascular    Venous insufficiency of both lower extremities       GI    GERD (gastroesophageal reflux disease)       Orthopedic    Osteopenia     Other Visit Diagnoses       Scleroderma    -  Primary            - was previously holding MMF due to URI, instructed to restart and continue MMF 1000mg BID (liquid suspension) today  - Cardiac: pHTN secondary to SSc, last echo 12/2021 with hgmm 32, EF 63%, and trivial posterior cardiac effusion; will order today  - Pulm: ILD, Last CT chest with stable from 2020. PFT from 1/202022 without DLCO, due to Dr. Leigh in 11/2022; will send message to Dr. Leigh to see if candidate for OFEV  1/2020:   FVC 74.3%,   FEV1/FVC  104.5%,   TLC 62.4%    DLCO  71.0%   9/2020:   FVC 72.3%    FEV1/FVC  99.8%      TLC 63.3%    DLCO  66.2%  5/2021:   FVC 67.8%    FEV1/FVC  101.3%    TLC 62.4%    DLCO  66.7%                  10/2022: FVC 67.6%,   FEV1/FVC  104.1%,   TLC 62.7%    DLCO  55.3%  - Vascular: Chronic bilateral Foot ulcers; seen last by vascular surgery Dr. Claudio in 12/2021 and noted to ulcers are likely due to chronic venous insufficiency; will schedule follow up with Dr. Claudio to see if additional intervention is needed  - instructed to hold MMF 1 week before hernia repair surgery and restart 1 week after  - up to date on covid series and flu vaccine    Follow-up in 3 months with labs before visit    Patient seen and evaluated with Dr. Ahuja

## 2022-12-19 NOTE — PROGRESS NOTES
I  Have personally take the history and examined the patient and agree with fellow's note as stated above. Performed. mRSS. Still poorly healing foot ulcers but improving. Still concerned with PAOD and venous insufficiency as contributory and will refer to Dr. Claudio in Vascular for re-evaluation. Also will message Dr. Leigh about adding nintedanib after there esophageal surgery next month.

## 2022-12-19 NOTE — TELEPHONE ENCOUNTER
Patient has r/s surgery to 2/9/23. Labs and ekg to be done one week prior at [facility]. Patient is ok with plan, OR schedulers informed.

## 2022-12-19 NOTE — PROGRESS NOTES
Rapid3 Question Responses and Scores 12/16/2022   MDHAQ Score 1.1   Psychologic Score 3.3   Pain Score 7.5   When you awakened in the morning OVER THE LAST WEEK, did you feel stiff? No   Fatigue Score 5   Global Health Score 5   RAPID3 Score 5.39     Answers submitted by the patient for this visit:  Rheumatology Questionnaire (Submitted on 12/16/2022)  fever: No  eye redness: No  mouth sores: No  headaches: No  shortness of breath: No  chest pain: No  trouble swallowing: Yes  diarrhea: No  constipation: No  unexpected weight change: No  genital sore: No  dysuria: No  During the last 3 days, have you had a skin rash?: No  Bruises or bleeds easily: No  cough: Yes

## 2022-12-29 ENCOUNTER — TELEPHONE (OUTPATIENT)
Dept: PAIN MEDICINE | Facility: CLINIC | Age: 72
End: 2022-12-29
Payer: MEDICARE

## 2022-12-30 ENCOUNTER — HOSPITAL ENCOUNTER (OUTPATIENT)
Dept: CARDIOLOGY | Facility: OTHER | Age: 72
Discharge: HOME | End: 2022-12-30
Attending: STUDENT IN AN ORGANIZED HEALTH CARE EDUCATION/TRAINING PROGRAM
Payer: MEDICARE

## 2022-12-30 ENCOUNTER — OFFICE VISIT (OUTPATIENT)
Dept: PAIN MEDICINE | Facility: CLINIC | Age: 72
End: 2022-12-30
Payer: MEDICARE

## 2022-12-30 VITALS
WEIGHT: 161 LBS | SYSTOLIC BLOOD PRESSURE: 120 MMHG | HEIGHT: 65 IN | TEMPERATURE: 98 F | BODY MASS INDEX: 26.82 KG/M2 | SYSTOLIC BLOOD PRESSURE: 158 MMHG | HEART RATE: 61 BPM | DIASTOLIC BLOOD PRESSURE: 60 MMHG | DIASTOLIC BLOOD PRESSURE: 69 MMHG | HEIGHT: 65 IN | BODY MASS INDEX: 26.82 KG/M2 | WEIGHT: 161 LBS | RESPIRATION RATE: 18 BRPM

## 2022-12-30 DIAGNOSIS — G89.4 CHRONIC PAIN DISORDER: Primary | ICD-10-CM

## 2022-12-30 DIAGNOSIS — M47.812 CERVICAL SPONDYLOSIS: ICD-10-CM

## 2022-12-30 DIAGNOSIS — M50.30 DDD (DEGENERATIVE DISC DISEASE), CERVICAL: ICD-10-CM

## 2022-12-30 DIAGNOSIS — M79.18 MYOFASCIAL PAIN: ICD-10-CM

## 2022-12-30 DIAGNOSIS — G60.9 IDIOPATHIC NEUROPATHY: ICD-10-CM

## 2022-12-30 DIAGNOSIS — G89.4 CHRONIC PAIN DISORDER: ICD-10-CM

## 2022-12-30 DIAGNOSIS — M34.89 CUTANEOUS SCLERODERMA: ICD-10-CM

## 2022-12-30 DIAGNOSIS — M34.9 SCLERODERMA: ICD-10-CM

## 2022-12-30 DIAGNOSIS — Z51.81 ENCOUNTER FOR MONITORING OPIOID MAINTENANCE THERAPY: ICD-10-CM

## 2022-12-30 DIAGNOSIS — Z79.891 ENCOUNTER FOR MONITORING OPIOID MAINTENANCE THERAPY: ICD-10-CM

## 2022-12-30 LAB
ASCENDING AORTA: 2.55 CM
AV INDEX (PROSTH): 0.67
AV MEAN GRADIENT: 7 MMHG
AV PEAK GRADIENT: 11 MMHG
AV VALVE AREA: 2.07 CM2
AV VELOCITY RATIO: 0.66
BSA FOR ECHO PROCEDURE: 1.83 M2
CV ECHO LV RWT: 0.35 CM
DOP CALC AO PEAK VEL: 1.69 M/S
DOP CALC AO VTI: 39.7 CM
DOP CALC LVOT AREA: 3.1 CM2
DOP CALC LVOT DIAMETER: 1.98 CM
DOP CALC LVOT PEAK VEL: 1.12 M/S
DOP CALC LVOT STROKE VOLUME: 82.17 CM3
DOP CALCLVOT PEAK VEL VTI: 26.7 CM
E WAVE DECELERATION TIME: 165.26 MSEC
E/A RATIO: 0.78
E/E' RATIO: 8.7 M/S
ECHO LV POSTERIOR WALL: 0.89 CM (ref 0.6–1.1)
EJECTION FRACTION: 67 %
FRACTIONAL SHORTENING: 38 % (ref 28–44)
INTERVENTRICULAR SEPTUM: 0.97 CM (ref 0.6–1.1)
IVC DIAMETER: 1.81 CM
IVRT: 110.73 MSEC
LA MAJOR: 5.45 CM
LA MINOR: 5.42 CM
LA WIDTH: 4.7 CM
LEFT ATRIUM SIZE: 3.9 CM
LEFT ATRIUM VOLUME INDEX MOD: 41.7 ML/M2
LEFT ATRIUM VOLUME INDEX: 47 ML/M2
LEFT ATRIUM VOLUME MOD: 75 CM3
LEFT ATRIUM VOLUME: 84.68 CM3
LEFT INTERNAL DIMENSION IN SYSTOLE: 3.17 CM (ref 2.1–4)
LEFT VENTRICLE DIASTOLIC VOLUME INDEX: 68.2 ML/M2
LEFT VENTRICLE DIASTOLIC VOLUME: 122.76 ML
LEFT VENTRICLE MASS INDEX: 94 G/M2
LEFT VENTRICLE SYSTOLIC VOLUME INDEX: 22.3 ML/M2
LEFT VENTRICLE SYSTOLIC VOLUME: 40.13 ML
LEFT VENTRICULAR INTERNAL DIMENSION IN DIASTOLE: 5.08 CM (ref 3.5–6)
LEFT VENTRICULAR MASS: 169.63 G
LV LATERAL E/E' RATIO: 7.91 M/S
LV SEPTAL E/E' RATIO: 9.67 M/S
LVOT MG: 2.36 MMHG
LVOT MV: 0.71 CM/S
MV PEAK A VEL: 1.12 M/S
MV PEAK E VEL: 0.87 M/S
MV STENOSIS PRESSURE HALF TIME: 47.92 MS
MV VALVE AREA P 1/2 METHOD: 4.59 CM2
PISA MRMAX VEL: 5.67 M/S
PISA TR MAX VEL: 3.14 M/S
PULM VEIN S/D RATIO: 1.31
PV PEAK D VEL: 0.62 M/S
PV PEAK S VEL: 0.81 M/S
PV PEAK VELOCITY: 1.09 CM/S
RA MAJOR: 5.45 CM
RA PRESSURE: 3 MMHG
RA WIDTH: 4.2 CM
RIGHT VENTRICULAR END-DIASTOLIC DIMENSION: 3.62 CM
RV TISSUE DOPPLER FREE WALL SYSTOLIC VELOCITY 1 (APICAL 4 CHAMBER VIEW): 12 CM/S
SINUS: 3 CM
STJ: 2.25 CM
TDI LATERAL: 0.11 M/S
TDI SEPTAL: 0.09 M/S
TDI: 0.1 M/S
TR MAX PG: 39 MMHG
TRICUSPID ANNULAR PLANE SYSTOLIC EXCURSION: 2.7 CM
TV REST PULMONARY ARTERY PRESSURE: 42 MMHG

## 2022-12-30 PROCEDURE — 99214 OFFICE O/P EST MOD 30 MIN: CPT | Mod: S$PBB,,, | Performed by: NURSE PRACTITIONER

## 2022-12-30 PROCEDURE — 99215 OFFICE O/P EST HI 40 MIN: CPT | Mod: PBBFAC,25,NTX | Performed by: NURSE PRACTITIONER

## 2022-12-30 PROCEDURE — 99214 PR OFFICE/OUTPT VISIT, EST, LEVL IV, 30-39 MIN: ICD-10-PCS | Mod: S$PBB,,, | Performed by: NURSE PRACTITIONER

## 2022-12-30 PROCEDURE — 93306 ECHO (CUPID ONLY): ICD-10-PCS | Mod: 26,NTX,, | Performed by: INTERNAL MEDICINE

## 2022-12-30 PROCEDURE — 99999 PR PBB SHADOW E&M-EST. PATIENT-LVL V: ICD-10-PCS | Mod: PBBFAC,,, | Performed by: NURSE PRACTITIONER

## 2022-12-30 PROCEDURE — 93306 TTE W/DOPPLER COMPLETE: CPT | Mod: 26,NTX,, | Performed by: INTERNAL MEDICINE

## 2022-12-30 PROCEDURE — 93306 TTE W/DOPPLER COMPLETE: CPT | Mod: TXP

## 2022-12-30 PROCEDURE — 99999 PR PBB SHADOW E&M-EST. PATIENT-LVL V: CPT | Mod: PBBFAC,,, | Performed by: NURSE PRACTITIONER

## 2022-12-30 RX ORDER — TIZANIDINE 4 MG/1
4 TABLET ORAL NIGHTLY PRN
Qty: 30 TABLET | Refills: 3 | Status: SHIPPED | OUTPATIENT
Start: 2022-12-30 | End: 2023-04-28 | Stop reason: SDUPTHER

## 2022-12-30 RX ORDER — NABUMETONE 750 MG/1
750 TABLET, FILM COATED ORAL 2 TIMES DAILY PRN
Qty: 60 TABLET | Refills: 3 | Status: SHIPPED | OUTPATIENT
Start: 2022-12-30 | End: 2023-04-28 | Stop reason: SDUPTHER

## 2022-12-30 RX ORDER — ACETAMINOPHEN AND CODEINE PHOSPHATE 300; 30 MG/1; MG/1
1 TABLET ORAL DAILY PRN
Qty: 30 TABLET | Refills: 0 | Status: SHIPPED | OUTPATIENT
Start: 2022-12-30 | End: 2023-01-29

## 2022-12-30 RX ORDER — PREGABALIN 300 MG/1
300 CAPSULE ORAL 2 TIMES DAILY
Qty: 60 CAPSULE | Refills: 6 | Status: SHIPPED | OUTPATIENT
Start: 2022-12-30 | End: 2023-04-28 | Stop reason: SDUPTHER

## 2022-12-30 NOTE — PROGRESS NOTES
Chronic Pain - Follow Up      Referring Physician: No ref. provider found    Chief Complaint:   Chief Complaint   Patient presents with    Foot Pain     B/L        SUBJECTIVE: Disclaimer: This note has been generated using voice-recognition software. There may be typographical errors that have been missed during proof-reading    Interval History 12/30/2022:  The patient returns to clinic today for follow up of neck and foot pain. She reports increased foot pain over the last 2 months. She continues to report ulcers to her feet. She reports bilateral foot pain. Her pain is worse at night. She reports intermittent neck pain. She is taking Lyrica, Zanaflex, and Relafen with benefit. She also takes Tylenol #3 with benefit. She denies any adverse effects. She denies any other health changes. Her pain today is 6/10.    Interval History 8/26/2022:  The patient returns to clinic today for follow up neck and foot pain. She reports increased pain over the last few days. She attributes this as she is out of Lyrica. She continues to report bilateral foot pain. She continues to have wounds. She continues to perform wound care. She continues to report intermittent neck pain. She continues to take Lyrica, Zanaflex, and Relafen with benefit. She continues to take Tylenol #3 for severe pain as needed with benefit. She denies any adverse effects. She denies any other health changes. Her pain today is 4/10.    Interval History 5/26/2022:  The patient returns to clinic today for follow up of neck and foot pain via virtual visit. She continues to report bilateral foot pain. She continues to report ulcers to her feet. She continues to report neck pain with intermittent radiating pain into her arms. She continues to report benefit with current medications. She is taking Lyrica, Zanaflex, and Relafen. She also takes Tylenol #3 with benefit. She denies any adverse effects with these medications. She continues to perform a home exercise  routine. She denies any other health changes.     Interval History 2/15/22  Patient presents in follow up. Was previously Dr. King patient with primary complaints of neck and foot pain. She reports her pain has been stable since her last visit. She did have covid in January and stopped all of ehr pain medications. She got an antibody infusion. She has fully recovered. She restarted her pain medications and reports that they are effective. She is taking Tylenol 3 daily, nambumetome 750 mg BID, lyrica 150 mg BID and Zanaflex 4 mg TID. She did do PT for her neck November to January per her report. She continues with mild neck pain without radiation into the arms or hands. Pain is worse with sidebending and rotation to the right and better with rest and medications. She denies any numbness tingling weakness or b/b incontinence.    Interval History 1/4/2022:  The patient returns to clinic today for follow up of foot pain via virtual visit. She continues to report foot pain and wounds. She continues to follow up with podiatry and wound care. She reports increased neck pain. This pain is tight and aching in nature. She denies any radicular arm pain. She is currently taking Relafen, Lyrica, and Tylenol #3 with benefit and without adverse effects. She reports limited benefit with Zanaflex. She denies any other health changes.     Interval History 12/8/2021:  The patient returns to clinic today for foot pain via virtual visit. She continues to report foot pain. She does have foot wounds. She recently saw Dr. Claudio in [facility]. He does not recommend any vascular interventions at this time. She continues to follow up with Podiatry and wound care. She continues to take Zanaflex, Relafen, Lyrica and Tylenol #3 with benefit. She denies any adverse effects. She denies any other health changes. Her pain today is 7/10.    Interval History 11/8/2021:  The patient returns to clinic today for follow up of foot pain via virtual visit.  At last visit, she was starting on Tylenol #3. She does find benefit with this. She sometimes breaks this in half. She continues to take Zanaflex, Relafen, and Lyrica. She continues to report bilateral foot pain and ulcers. She continues to follow up with podiatry. Her pain is worse at night. She denies any other health changes. Her pain today is 7/10.    Interval History 10/20/2021:  The patient returns to clinic today for follow up of foot pain. At last visit, she was started on Tramadol. She did have relief but had significant side effects. She experienced sedation, itching, headaches, and decreased appetite. This has resolved after stopping the medication. She continues to report foot pain. This pain is worse at night. She continues to follow up with podiatry. She continues to take Tizanidine, Relafen, and Lyrica with some benefit. She denies any adverse effects. She denies any other health changes. Her pain today is 8/10.    Interval history 10/06/2021:  Since previous encounter the patient continues to have nonhealing wound has been followed up with wound care and is scheduled to follow with Rheumatology for the wounds in her legs she was referred to podiatry with stated that they have done nothing different me that she with doing on her own.  She continues to have neck pain and bilateral foot pain.  She has been taking tizanidine Relafen Tylenol p.m. and Lyrica 600 mg per day.  She states that all these medications are helpful.    Interval History 6/3/2021:  The patient returns to clinic today for follow up of foot pain. She continues to report left foot pain secondary to ulcer. She is seeing Wound Care. She describes this pain as burning in nature. Her pain is worse with prolonged activity. She reports intermittent neck pain. She continues to take Zanaflex. She reports limited benefit with increased Lyrica dose. She denies any other health changes. Her pain today is 5/10.    Interval History 5/5/2021:  The  patient returns to clinic today for follow up of foot pain. She reports a new ulcer on her left foot. She is seeing Wound Care. She reports increased pain with this new ulcer. She describes this pain as burning. She continues to report neck pain that is tight and aching. She denies any radicular arm pain. Her pain is worse with prolonged activity, especially turning her head to the side. She continues to perform a home exercise routine. She continues to take Lyrica and Zanaflex with benefit. She denies any other health changes. Her pain today is 5/10.    Interval History 2/8/2021:  The patient returns to clinic today for follow up of neck and foot pain. She reports that her neck pain has significantly improved since last visit. She has recently completed physical therapy for this. She is performing a home stretching routine. She reports intermittent neck pain that is tight and aching in nature. She denies any radicular arm pain. She continues to report bilateral foot pain. This is worse at night. She continues to take Lyrica and Zanaflex with benefit. She denies any other health changes. Her pain today is 4/10.    Interval History 1/8/2021:  The patient returns to clinic today for follow up of neck and foot pain. She continues to report neck pain that is tight and aching in nature. She denies any radicular pain. She continues to participate in physical therapy and a home exercise routine. She continues to report bilateral foot pain, worse at night. She reports that increased Lyrica dose has provided improved benefit. She also takes Zanaflex with benefit. She denies any other health changes. Her pain today is 3/10.    Interval History 11/13/2020:  The patient returns to clinic today for follow up of neck and foot pain. She continues to report bilateral foot pain. Since last visit, she had a venous ablation procedure on her right leg through Vascular. She is scheduled for the left side in the next month. She continues  to report bilateral foot pain, worse at night. She continues to report neck pain that is tight and aching in nature. She denies any radicular pain. Her pain is worse flexion and turning her head to the side. She is currently participating in physical therapy with some benefit. She continues to take Lyrica but does ask if this could be increased. She continues to take Zanaflex with benefit. She denies any other health changes. Her pain today is 5/10    Interval History 10/2/2020:  The patient returns to clinic today for follow up of foot pain. She reports increased bilateral foot pain over the last few months. This pain is burning in nature. This pain is worse at night. She continues to have ulcers to her feet related to her scleroderma. She would like to restart the Lyrica. She also reports increased neck pain that is sore and aching in nature. She denies any radiating arm pain. This is worse with turning her head and at night. She denies any other health changes. Her pain today is 7/10.    Interval History 6/5/2020:  The patient requests audio visit today for follow up of bilateral foot pain. She reports intermittent foot pain that is tolerable at this time. She has discontinued Lyrica as of April. She continues to have ulcers to her feet. She does have wound care. She denies any other health changes.     Interval History 1/17/2020:  The patient returns to clinic today for follow up. She continues to report bilateral foot pain. She describes this pain as burning and tingling in nature. At last visit, we decreased her Lyrica dose to 100 mg at night. She reports increased pain since then. She would like to go to back to the 150 mg dose. She continues to have ulcers to her feet, left worse than right. She continues to participate in a home wound care program. She denies any other health changes. Her pain today is 6/10.    Interval History 12/17/2019:  The patient returns to clinic today for follow up. She reports  improving foot pain. She reports improved healing ulcers to her left foot. She continues to report right foot pain that is burning and tingling in nature. She continues to participate in a home wound care routine. She continues to take Lyrica. She asks about decreasing this. She denies any other health changes. Her pain today is 5/10.    Interval History 9/17/2019:  The patient returns to clinic today for follow up. She continues to report bilateral foot pain that is burning and tingling in nature. Her pain is worse with sitting and at night. She continues to have slow healing ulcers to both feet. She continues to perform a home wound care program. She is no longer taking Gabapentin which was previously called in. She is now taking Pregabalin generic with benefit. She denies any other health changes. Her pain today is 4/10.    Interval History 6/13/2019:  The patient returns to clinic for follow up for bilateral foot pain. She continues to report bilateral foot pain that is burning in nature. She continues to have slow healing wounds to both feet from ulcers. She continues to take Lyrica once daily but reports increased pain. She continues to take Vitamin B12. She denies any other health changes. Her pain today is 8/10.    Interval History 3/13/2019:  The patient returns to clinic today for follow up. She continues to report bilateral foot pain that is burning and tingling in nature. Her pain is worse with prolonged walking and standing. She continues to have bilateral foot wounds. She reports that these wounds have significantly improved since last visit. She is currently taking Vitamin B12 with benefit. She continues to report benefit with Lyrica. She is currently taking this once daily. She denies any other health changes. Her pain today is 5/10.    Interval History 2/6/2019:  The patient returns to clinic today for follow up. She reports that her bilateral foot wounds have significantly improved since last  visit. She does report some significant improvement in her foot pain. She reports intermittent bilateral foot pain especially with prolonged walking. She describes this pain as heavy and tingling in nature. She is no longer taking Celebrex. She is currently taking Lyrica 150 mg BID with benefit. She does report that the Lyrica is expensive for her. She denies any other health changes. Her pain today is 5/10.    Interval History 12/5/18:  Patient returns to clinic today for follow up. She reports that since increasing her medications, she is having significant improvement in pain during the day time. She is currently taking Lyrica 75mg TID and Celebrex 200mg TID. However, she states that the burning  And tingling pain in both her feet increase in the evening around 6 or 7pm. She is unable to sleep during the nights due to the pain. She is still wearing her bilateral boots due to non healing ulcers from her scleroderma.     Interval History 8/30/2018:  The patient returns to clinic today for follow up. She continues to report bilateral foot pain that is burning and tingling in nature. She reports that this pain is worse at night. She is currently taking Lyrica 75 mg BID with limited relief. She did not have any benefit with Mobic. She continues to take Aleve with some benefit. She continues to have nonhealing ulcers. She wears an ACE bandage to her right calf, as well as boots to her bilateral feet. She denies any other health changes. Her pain today is 7/10.     Interval History 7/11/2018:  Since previous encounter she has weaned from gabapentin to 600mg / day and did not notice significant worsening of pain, but was having somnelence from higher doses of the medication in the past.  We are weaning in an attempt to trial Lyrica as an alternative to gabapentin.  Tramadol causes significant sedation, limiting its use.  She has discontinued the use of the tramadol.  Additionally she does have benefit from  anti-inflammatory medication, has been using aleve, has not trialed CANTRELL-2 inhibitors in the past.    Interval history 05/16/2018:      The patient has had x-rays of the lumbar spine which did not reveal any evidence of significant neuroforaminal stenosis with degenerative disc disease.  There is mild facet arthropathy.  She has escalated her gabapentin and is currently taking 2100 mg of gabapentin per day without any noticeable improvement but daytime somnolence.  She continues to have nonhealing ulcers and topical pain cream is helping to a limited degree over her leg in areas where there is no skin disruption.    Initial encounter:    Yamel Shah presents to the clinic for the evaluation of foot pain. The pain started 2 years ago following ulcers and symptoms have been worsening.    Brief history: History of scleroderma    Pain Description:    The pain is located in the both feet in the area of slowly healing chronic foot ulcers which were partly from venous insufficiency and stasis associated with her scleroderma.  In her right lower extremity she describes radicular symptoms in the L4/5 distribution.    At BEST  5/10     At WORST  10/10 on the WORST day.      On average pain is rated as 8/10.     Today the pain is rated as 8/10    The pain is described as burning, sharp, shooting and constant       Symptoms interfere with daily activity, sleeping and work.     Exacerbating factors: Laying, Walking, Night Time, Morning and Getting out of bed/chair.      Mitigating factors medications.     Patient denies night fever/night sweats, urinary incontinence, bowel incontinence, significant weight loss, significant motor weakness and loss of sensations.  Patient denies any suicidal or homicidal ideations    Pain Medications:  Current:  Lyrica 300 mg daily  Zanaflex  Relafen  Tylenol #3    Tried in Past:  NSAIDs -Aleve, Mobic, Celebrex  TCA -Never  SNRI -Never  Anti-convulsants - Gabapentin, Lyrica  Muscle  Relaxants -Never  Opioids-Tramadol    Physical Therapy/Home Exercise: no       report:  Reviewed and consistent with medication use as prescribed.    Pain Procedures: none    Chiropractor -never  Acupuncture - never  TENS unit -never  Spinal decompression -never  Joint replacement -never    Imaging:   Xray Cervical Spine 12/6/2019:  FINDINGS:  Odontoid prevertebral soft tissues and posterior elements are intact.  Neural foramina are patent.  No fracture dislocation bone destruction seen.  There is mild DJD.     Impression:     Mild DJD.    X-ray lumbar spine 6/1/2016:  2 views: Alignment is normal. There is mild DJD. No fracture dislocation bone destruction seen.   Impression      Mild DJD.     Xray lumbar spine 4/2018:  COMPARISON:  June 2016    FINDINGS:  There is slight curvature of the lumbar spine.  The vertebral bodies are normally aligned and normal in height.  Mild disc space narrowing present at L5-S1.  There is mild facet degenerative change in the lower spine.  No significant osteophytic spurring present.  There is no change in alignment with flexion or extension.      Past Medical History:   Diagnosis Date    Abnormal Pap smear     Acid reflux     Allergy     Arthritis     Encounter for blood transfusion     GERD (gastroesophageal reflux disease)     History of migraine headaches     Hx of colonic polyp     Hypertension     Idiopathic neuropathy 7/20/2012    ILD (interstitial lung disease) 11/6/2013    Iron deficiency anemia 3/18/2014    MRSA carrier     Osteopenia     Pneumonia     Pulmonary fibrosis     Pulmonary hypertension     Raynaud's disease     Scleroderma, diffuse     Sjogren's syndrome     Vitamin D deficiency 11/14/2013     Past Surgical History:   Procedure Laterality Date    24 HOUR IMPEDANCE PH MONITORING OF ESOPHAGUS IN PATIENT NOT TAKING ACID REDUCING MEDICATIONS N/A 3/4/2020    Procedure: IMPEDANCE PH STUDY, ESOPHAGEAL, 24 HOUR, IN PATIENT NOT TAKING ACID REDUCING MEDICATION;   Surgeon: Annamaria Mendoza MD;  Location: [facility] (4TH FLR);  Service: Endoscopy;  Laterality: N/A;  OFF PPI/H2 Blocker   Motility Studies   Hold Narcotics x 1 days   Hold TCA x 1 days  2/26 - LVM attempting to confirm appt  2/27 - Confirmed appt    ANORECTAL MANOMETRY N/A 5/10/2021    Procedure: MANOMETRY, ANORECTAL with balloon expulsion test;  Surgeon: Annamaria Mendoza MD;  Location: [facility] (4TH FLR);  Service: Endoscopy;  Laterality: N/A;  order combined  covid test 5/7 [facility], instructions emailed-hospitals    BREAST BIOPSY      Left, benign    CERVICAL CONIZATION   W/ LASER  1970    COLONOSCOPY      COLONOSCOPY N/A 3/29/2019    Procedure: COLONOSCOPY;  Surgeon: Annamaria Mendoza MD;  Location: [facility] (2ND FLR);  Service: Endoscopy;  Laterality: N/A;    COLONOSCOPY N/A 5/10/2021    Procedure: COLONOSCOPY;  Surgeon: Annamaria Mendoza MD;  Location: [facility] (4TH FLR);  Service: Endoscopy;  Laterality: N/A;  2nd floor-previous scopes done on 2nd floor, gastroparesis  full liquid diet x2 days, clear liquid x1 day prior to procedure  covid test 5/7 [facility], instructions emailed-hospitals  5/6 pt confirmed [id number]    DILATION AND CURETTAGE OF UTERUS      ESOPHAGEAL MANOMETRY WITH MEASUREMENT OF IMPEDANCE N/A 3/4/2020    Procedure: MANOMETRY, ESOPHAGUS, WITH IMPEDANCE MEASUREMENT;  Surgeon: Annamaria Mendoza MD;  Location: [facility] (4TH FLR);  Service: Endoscopy;  Laterality: N/A;  OFF PPI/H2 Blocker   Motility Studies   Hold Narcotics x 1 days   Hold TCA x 1 days    ESOPHAGOGASTRODUODENOSCOPY      ESOPHAGOGASTRODUODENOSCOPY N/A 3/29/2019    Procedure: EGD (ESOPHAGOGASTRODUODENOSCOPY);  Surgeon: Annamaria Mendoza MD;  Location: [facility] (2ND FLR);  Service: Endoscopy;  Laterality: N/A;  pulmonary htn    ESOPHAGOGASTRODUODENOSCOPY N/A 2/2/2021    Procedure: ESOPHAGOGASTRODUODENOSCOPY (EGD);  Surgeon: Annamaria Mendoza MD;  Location: [facility] (2ND FLR);  Service: Endoscopy;  Laterality: N/A;  2nd floor gastroparesis  3  days full liquid diet and 1 day clears  covid test 1/30 primary care, instructions sent to [facility]    ESOPHAGOGASTRODUODENOSCOPY N/A 7/1/2022    Procedure: ESOPHAGOGASTRODUODENOSCOPY (EGD);  Surgeon: Annamaria Mendoza MD;  [facility]  Service: Endoscopy;  Laterality: N/A;  2nd floor-gastroparesis  full liquid diet x3 days, clear liquid diet x1 day prior to procedure  fully vaccinated, instructions sent to [facility]  6/29-pt confirmed new arrival time-[facility]    HYSTERECTOMY  1990    FRANDY (AUB, Fibroids), ovaries remain    RIGHT HEART CATHETERIZATION Right 1/5/2021    Procedure: INSERTION, CATHETER, RIGHT HEART;  Surgeon: Aureliano Sy MD;  Location: [facility] CATH LAB;  Service: Cardiology;  Laterality: Right;    VARICOSE VEIN SURGERY       Social History     Socioeconomic History    Marital status:      Spouse name: Lewis    Number of children: 4   Occupational History    Occupation: -retired     Employer: [employer]     Comment: [employer]     Employer: RETIRED   Tobacco Use    Smoking status: Never    Smokeless tobacco: Never   Substance and Sexual Activity    Alcohol use: Yes     Comment: wine occasionally    Drug use: No    Sexual activity: Yes     Partners: Male     Birth control/protection: None   Other Topics Concern    Are you pregnant or think you may be? No    Breast-feeding No   Social History Narrative         Social Determinants of Health     Financial Resource Strain: Low Risk     Difficulty of Paying Living Expenses: Not very hard   Food Insecurity: No Food Insecurity    Worried About Running Out of Food in the Last Year: Never true    Ran Out of Food in the Last Year: Never true   Transportation Needs: No Transportation Needs    Lack of Transportation (Medical): No    Lack of Transportation (Non-Medical): No   Physical Activity: Inactive    Days of Exercise per Week: 0 days    Minutes of Exercise per Session: 0 min   Stress: Stress  Concern Present    Feeling of Stress : To some extent   Social Connections: Moderately Isolated    Frequency of Communication with Friends and Family: More than three times a week    Frequency of Social Gatherings with Friends and Family: Never    Attends Yazidi Services: Never    Active Member of Clubs or Organizations: No    Attends Club or Organization Meetings: Patient refused    Marital Status:    Housing Stability: Low Risk     Unable to Pay for Housing in the Last Year: No    Number of Places Lived in the Last Year: 1    Unstable Housing in the Last Year: No     Family History   Problem Relation Age of Onset    Breast cancer Mother     Hypertension Father     Breast cancer Sister     Diabetes Sister     Osteoarthritis Brother     Diabetes Brother     No Known Problems Daughter     No Known Problems Daughter     No Known Problems Son     No Known Problems Son     Breast cancer Maternal Aunt     Melanoma Neg Hx     Colon cancer Neg Hx     Crohn's disease Neg Hx     Stomach cancer Neg Hx     Ulcerative colitis Neg Hx     Rectal cancer Neg Hx     Irritable bowel syndrome Neg Hx     Esophageal cancer Neg Hx     Celiac disease Neg Hx     Ovarian cancer Neg Hx     Liver cancer Neg Hx     Pancreatic cancer Neg Hx        Review of patient's allergies indicates:   Allergen Reactions    Sulfa (sulfonamide antibiotics)      Other reaction(s): Rash       Current Outpatient Medications   Medication Sig    albuterol (VENTOLIN HFA) 90 mcg/actuation inhaler Inhale 2 puffs into the lungs every 4 (four) hours as needed for Wheezing. Rescue    carboxymethylcellulose sodium (REFRESH TEARS) 0.5 % Drop Apply 1-2 drops to every as needed    ergocalciferol (ERGOCALCIFEROL) 50,000 unit Cap Take 1 capsule (50,000 Units total) by mouth every 7 days.    ferrous sulfate 325 (65 FE) MG EC tablet Take 325 mg by mouth 2 (two) times daily.    flu vac 2022 [id number], (FLUAD QUAD 2022-23,65Y UP,,PF,) 60 mcg (15 mcg x 4)/0.5 mL  Syrg Inject into the muscle.    multivitamin capsule Take 1 capsule by mouth once daily.    nabumetone (RELAFEN) 750 MG tablet Take 1 tablet (750 mg total) by mouth 2 (two) times daily as needed for Pain.    NIFEdipine (ADALAT CC) 90 MG TbSR TAKE 1 TABLET(90 MG) BY MOUTH EVERY DAY    NIFEdipine (PROCARDIA-XL) 30 MG (OSM) 24 hr tablet TAKE 1 TABLET(30 MG) BY MOUTH EVERY DAY    pravastatin (PRAVACHOL) 20 MG tablet TAKE 1 TABLET(20 MG) BY MOUTH EVERY EVENING    pregabalin (LYRICA) 300 MG Cap Take 1 capsule (300 mg total) by mouth 2 (two) times daily.    PREVIDENT 5000 BOOSTER PLUS 1.1 % Pste SMARTSIG:To Teeth    PREVIDENT 5000 SENSITIVE 1.1-5 % Pste BRUSH FOR 2 MINUTES TWICE PER DAY    RABEprazole (ACIPHEX) 20 mg tablet Take 1 tablet (20 mg total) by mouth 2 (two) times daily.    sars-cov-2, covid-19, (MODERNA COVID-19) 50 mcg/0.25 ml injection (BOOSTER) Inject into the muscle.    sodium chloride 0.9% 0.9 % irrigation Irrigate with 2 mLs as directed daily as needed (wound cleaning).    tadalafil (ADCIRCA) 20 mg Tab Take 2 tablets (40 mg total) by mouth once daily.    tiZANidine (ZANAFLEX) 4 MG tablet Take 1 tablet (4 mg total) by mouth nightly as needed (muscle pain).    mycophenolate mofetil (CELLCEPT) 200 mg/mL SusR Take 5 mLs (1,000 mg total) by mouth 2 (two) times daily.     No current facility-administered medications for this visit.       REVIEW OF SYSTEMS:    GENERAL:  No weight loss, malaise or fevers.  HEENT:   No recent changes in vision or hearing  NECK:  Negative for lumps,  difficulty with swallowing associated with scleroderma.  RESPIRATORY:  Negative for cough, wheezing or shortness of breath, patient denies any recent URI. Albuterol (history of ILD)  CARDIOVASCULAR:  Negative for chest pain, leg swelling or palpitations.  GI:  Negative for abdominal discomfort, blood in stools or black stools or change in bowel habits. GERD controlled zantac  MUSCULOSKELETAL:  See HPI.  SKIN:   Chronic low healing  "venous stasis ulcerations to bilateral lower extremities   PSYCH:  No mood disorder or recent psychosocial stressors.  Patients sleep is not disturbed secondary to pain.  HEMATOLOGY/LYMPHOLOGY:   History of iron deficiency anemia, takes daily iron supplementation.    ENDO: No history of diabetes or thyroid dysfunction  NEURO:   No history of headaches, syncope, paralysis, seizures or tremors.  All other reviewed and negative other than HPI.    OBJECTIVE:    BP (!) 158/69   Pulse 61   Temp 98 °F (36.7 °C) (Oral)   Resp 18   Ht 5' 5" (1.651 m)   Wt 73 kg (161 lb)   BMI 26.79 kg/m²     PHYSICAL EXAMINATION:    GENERAL: Well appearing, in no acute distress, alert and oriented x3.  PSYCH:  Mood and affect appropriate.  SKIN: Tight skin associated with scleroderma. Wearing shoes today.   HEAD/FACE:  Normocephalic, atraumatic. Cranial nerves grossly intact.  NECK: There is no pain with palpation over trapezius muscle bilaterally. There is no pain with palpation over cervical facet joints. Limited ROM with mild pain on extension and lateral rotation.    CV: RRR with palpation of the radial artery.  PULM: No evidence of respiratory difficulty, symmetric chest rise.  EXTREMITIES: Contractures over on bilateral hands with ulcerations to knuckles, right greater than left.   MUSCULOSKELETAL:   Bilateral lower extremity strength testing limited secondary to pain in the feet.    NEURO: Bilateral lower extremity coordination and muscle stretch reflexes are physiologic and symmetric. Decreased sensation to BLE. Plantar response are downgoing. No clonus.  No loss of sensation is noted.  GAIT: Antalgic, ambulates without assistance       ASSESSMENT: 72 y.o. year old female with pain, consistent with the following:    Encounter Diagnoses   Name Primary?    Chronic pain disorder Yes    Cutaneous scleroderma     Idiopathic neuropathy     Myofascial pain     Cervical spondylosis     DDD (degenerative disc disease), cervical     " Encounter for monitoring opioid maintenance therapy          PLAN:     - Previous imaging reviewed today. Labs reviewed.     - Consider cervical facet joint injections in the future.     - Continue Lyrica 300 mg BID. Refill provided.     - Continue Relafen. Refill provided.     - Continue Zanaflex, refill provided.     - Pain Contract signed 8/26/2022.     - Continue Tylenol #3 once daily PRN. Refill provided.     - The patient is here today for a refill of current pain medications and they believe these provide effective pain control and improvements in quality of life.  The patient notes no serious side effects, and feels the benefits outweigh the risks.  The patient was reminded of the pain contract that they signed previously as well as the risks and benefits of the medication including possible death.  The updated [place] [employer] prescription monitoring program was reviewed, and the patient has been found to be compliant with current treatment plan. Medication management provided by Dr. Hinson.     - Previous UDS reviewed.      - Continue home exercise routine.     - RTC in 3 months or sooner if needed. She may call for refills.     The above plan and management options were discussed at length with patient. Patient is in agreement with the above and verbalized understanding.     Viktoriya Camara NP  12/30/2022

## 2023-01-03 ENCOUNTER — TELEPHONE (OUTPATIENT)
Dept: RHEUMATOLOGY | Facility: CLINIC | Age: 73
End: 2023-01-03
Payer: MEDICARE

## 2023-01-03 ENCOUNTER — TELEPHONE (OUTPATIENT)
Dept: TRANSPLANT | Facility: CLINIC | Age: 73
End: 2023-01-03
Payer: MEDICARE

## 2023-01-03 DIAGNOSIS — I27.20 PULMONARY HYPERTENSION: Primary | ICD-10-CM

## 2023-01-03 DIAGNOSIS — Z79.899 POLYPHARMACY: ICD-10-CM

## 2023-01-03 DIAGNOSIS — R06.82 TACHYPNEA: Primary | ICD-10-CM

## 2023-01-03 DIAGNOSIS — I27.9 CHRONIC PULMONARY HEART DISEASE: ICD-10-CM

## 2023-01-03 NOTE — TELEPHONE ENCOUNTER
----- Message from Luis Ahuja MD sent at 12/30/2022  8:39 PM CST -----  Funmi, please arrange for Heart Transplant  f/u for [facility] given substantial increase in ePASP compared with one year ago.Thank you.RJQ  ----- Message -----  From: Antelmo Gaytan MD  Sent: 12/30/2022   5:18 PM CST  To: Luis Ahuja MD

## 2023-01-18 ENCOUNTER — TELEPHONE (OUTPATIENT)
Dept: TRANSPLANT | Facility: CLINIC | Age: 73
End: 2023-01-18
Payer: MEDICARE

## 2023-01-19 ENCOUNTER — TELEPHONE (OUTPATIENT)
Dept: TRANSPLANT | Facility: CLINIC | Age: 73
End: 2023-01-19
Payer: MEDICARE

## 2023-01-20 ENCOUNTER — TELEPHONE (OUTPATIENT)
Dept: TRANSPLANT | Facility: CLINIC | Age: 73
End: 2023-01-20
Payer: MEDICARE

## 2023-01-26 ENCOUNTER — PATIENT MESSAGE (OUTPATIENT)
Dept: PHARMACY | Facility: CLINIC | Age: 73
End: 2023-01-26
Payer: MEDICARE

## 2023-01-26 ENCOUNTER — SPECIALTY PHARMACY (OUTPATIENT)
Dept: PHARMACY | Facility: CLINIC | Age: 73
End: 2023-01-26
Payer: MEDICARE

## 2023-01-26 DIAGNOSIS — M34.9 SCLERODERMA: ICD-10-CM

## 2023-01-26 RX ORDER — MYCOPHENOLATE MOFETIL 200 MG/ML
1000 POWDER, FOR SUSPENSION ORAL 2 TIMES DAILY
Qty: 480 ML | Refills: 1 | Status: SHIPPED | OUTPATIENT
Start: 2023-01-26 | End: 2023-04-06 | Stop reason: SDUPTHER

## 2023-01-26 NOTE — TELEPHONE ENCOUNTER
Pt called states she is in [facility] with daughter and needs cellcept refill she will be out over the weekend. Prescription remaining qty transferred to [facility] pharmacy local to her. Message sent to [facility] for new order, if appropriate.     Explained to pharmacist, Ольга MALONEY qty remaining on script is only #160 mL, but provider messaged to send new order to her pharmacy. Voiced understanding.

## 2023-01-26 NOTE — TELEPHONE ENCOUNTER
Dr. Ahuja sent new order Cellcept[id number]

## 2023-02-01 ENCOUNTER — TELEPHONE (OUTPATIENT)
Dept: PAIN MEDICINE | Facility: CLINIC | Age: 73
End: 2023-02-01
Payer: MEDICARE

## 2023-02-01 NOTE — TELEPHONE ENCOUNTER
Patient was left a message regarding appointment on 03/30/23 with Viktoriya Camara NP. Patient was informed that her appointment will need to be rescheduled due to provider being out of office at that time. Patient appointment was canceled and also informed to contact office with any questions regarding rescheduling.

## 2023-02-07 ENCOUNTER — TELEPHONE (OUTPATIENT)
Dept: VASCULAR SURGERY | Facility: CLINIC | Age: 73
End: 2023-02-07
Payer: MEDICARE

## 2023-02-07 NOTE — TELEPHONE ENCOUNTER
Attempted to contact the pt regarding reason for appt but no answer.Left a voice message with a call back number for further discussion [phone]

## 2023-02-13 DIAGNOSIS — I87.2 VENOUS INSUFFICIENCY OF BOTH LOWER EXTREMITIES: Primary | ICD-10-CM

## 2023-02-28 ENCOUNTER — DOCUMENTATION ONLY (OUTPATIENT)
Dept: TRANSPLANT | Facility: CLINIC | Age: 73
End: 2023-02-28
Payer: MEDICARE

## 2023-02-28 ENCOUNTER — OFFICE VISIT (OUTPATIENT)
Dept: TRANSPLANT | Facility: CLINIC | Age: 73
End: 2023-02-28
Payer: MEDICARE

## 2023-02-28 ENCOUNTER — HOSPITAL ENCOUNTER (OUTPATIENT)
Dept: CARDIOLOGY | Facility: CLINIC | Age: 73
Discharge: HOME | End: 2023-02-28
Payer: MEDICARE

## 2023-02-28 ENCOUNTER — HOSPITAL ENCOUNTER (OUTPATIENT)
Dept: PULMONOLOGY | Facility: CLINIC | Age: 73
Discharge: HOME | End: 2023-02-28
Payer: MEDICARE

## 2023-02-28 VITALS
HEART RATE: 74 BPM | BODY MASS INDEX: 26.23 KG/M2 | SYSTOLIC BLOOD PRESSURE: 129 MMHG | DIASTOLIC BLOOD PRESSURE: 62 MMHG | HEIGHT: 65 IN | WEIGHT: 157.44 LBS

## 2023-02-28 VITALS — BODY MASS INDEX: 25.83 KG/M2 | WEIGHT: 155 LBS | HEIGHT: 65 IN

## 2023-02-28 DIAGNOSIS — M85.89 OSTEOPENIA OF MULTIPLE SITES: ICD-10-CM

## 2023-02-28 DIAGNOSIS — M34.9 SCLERODERMA WITH PULMONARY INVOLVEMENT: ICD-10-CM

## 2023-02-28 DIAGNOSIS — I73.00 RAYNAUD'S DISEASE WITHOUT GANGRENE: ICD-10-CM

## 2023-02-28 DIAGNOSIS — R13.10 DYSPHAGIA, UNSPECIFIED TYPE: ICD-10-CM

## 2023-02-28 DIAGNOSIS — K21.9 GASTROESOPHAGEAL REFLUX DISEASE, UNSPECIFIED WHETHER ESOPHAGITIS PRESENT: ICD-10-CM

## 2023-02-28 DIAGNOSIS — D50.9 IRON DEFICIENCY ANEMIA, UNSPECIFIED IRON DEFICIENCY ANEMIA TYPE: ICD-10-CM

## 2023-02-28 DIAGNOSIS — Z79.899 IMMUNOSUPPRESSION DUE TO DRUG THERAPY: ICD-10-CM

## 2023-02-28 DIAGNOSIS — K21.00 HIATAL HERNIA WITH GERD AND ESOPHAGITIS: ICD-10-CM

## 2023-02-28 DIAGNOSIS — I70.0 CALCIFICATION OF AORTA: ICD-10-CM

## 2023-02-28 DIAGNOSIS — I27.20 PULMONARY HYPERTENSION: Primary | ICD-10-CM

## 2023-02-28 DIAGNOSIS — I10 ESSENTIAL HYPERTENSION: ICD-10-CM

## 2023-02-28 DIAGNOSIS — K44.9 HIATAL HERNIA WITH GERD AND ESOPHAGITIS: ICD-10-CM

## 2023-02-28 DIAGNOSIS — J84.9 ILD (INTERSTITIAL LUNG DISEASE): ICD-10-CM

## 2023-02-28 DIAGNOSIS — D84.821 IMMUNOSUPPRESSION DUE TO DRUG THERAPY: ICD-10-CM

## 2023-02-28 DIAGNOSIS — I27.9 CHRONIC PULMONARY HEART DISEASE: ICD-10-CM

## 2023-02-28 DIAGNOSIS — M34.89 CUTANEOUS SCLERODERMA: ICD-10-CM

## 2023-02-28 DIAGNOSIS — I73.9 PVD (PERIPHERAL VASCULAR DISEASE): ICD-10-CM

## 2023-02-28 PROCEDURE — 93005 ELECTROCARDIOGRAM TRACING: CPT | Mod: PBBFAC,NTX | Performed by: INTERNAL MEDICINE

## 2023-02-28 PROCEDURE — 99214 OFFICE O/P EST MOD 30 MIN: CPT | Mod: PBBFAC,TXP | Performed by: INTERNAL MEDICINE

## 2023-02-28 PROCEDURE — 94618 PULMONARY STRESS TESTING: CPT | Mod: 26,S$PBB,NTX, | Performed by: INTERNAL MEDICINE

## 2023-02-28 PROCEDURE — 99999 PR PBB SHADOW E&M-EST. PATIENT-LVL IV: ICD-10-PCS | Mod: PBBFAC,TXP,, | Performed by: INTERNAL MEDICINE

## 2023-02-28 PROCEDURE — 94618 PULMONARY STRESS TESTING: ICD-10-PCS | Mod: 26,S$PBB,NTX, | Performed by: INTERNAL MEDICINE

## 2023-02-28 PROCEDURE — 99214 PR OFFICE/OUTPT VISIT, EST, LEVL IV, 30-39 MIN: ICD-10-PCS | Mod: S$PBB,NTX,, | Performed by: INTERNAL MEDICINE

## 2023-02-28 PROCEDURE — 99214 OFFICE O/P EST MOD 30 MIN: CPT | Mod: S$PBB,NTX,, | Performed by: INTERNAL MEDICINE

## 2023-02-28 PROCEDURE — 93010 EKG 12-LEAD: ICD-10-PCS | Mod: S$PBB,NTX,, | Performed by: INTERNAL MEDICINE

## 2023-02-28 PROCEDURE — 94618 PULMONARY STRESS TESTING: CPT | Mod: PBBFAC,NTX | Performed by: INTERNAL MEDICINE

## 2023-02-28 PROCEDURE — 99999 PR PBB SHADOW E&M-EST. PATIENT-LVL IV: CPT | Mod: PBBFAC,TXP,, | Performed by: INTERNAL MEDICINE

## 2023-02-28 PROCEDURE — 93010 ELECTROCARDIOGRAM REPORT: CPT | Mod: S$PBB,NTX,, | Performed by: INTERNAL MEDICINE

## 2023-02-28 NOTE — PROCEDURES
Yamel Shah is a 72 y.o.  female patient, who presents for a 6 minute walk test ordered by DO Neris.  The diagnosis is Interstitial Lung Disease; Scleroderma/CREST, Pulmonary Hypertension.  The patient's BMI is 25.8 kg/m2.  Predicted distance (lower limit of normal) is 303.08 meters.      Test Results:    The test was completed without stopping.  The total time walked was 360 seconds.  During walking, the patient reported:  Tachycardia.  The patient used no assistive devices during testing.     02/28/2023---------Distance: 457.2 meters (1500 feet)     O2 Sat % Supplemental Oxygen Heart Rate Blood Pressure Ju Scale   Pre-exercise  (Resting) 98 % Room Air 74 bpm 133/63 mmHg 3   During Exercise 94 % Room Air 113 bpm 169/76 mmHg 4   Post-exercise  (Recovery) 97 % Room Air  84 bpm 135/63 mmHg      Recovery Time: 160 seconds    Performing nurse/tech: [employer] RRT      PREVIOUS STUDY:   10/07/2022---------Distance: 426.72 meters (1400 feet)       O2 Sat % Supplemental Oxygen Heart Rate Blood Pressure Ju Scale   Pre-exercise  (Resting) 98 % Room Air 85 bpm 126/61 mmHg 3   During Exercise 97 % Room Air 108 bpm 138/63 mmHg 4   Post-exercise  (Recovery) 97 % Room Air  103 bpm           CLINICAL INTERPRETATION:  Six minute walk distance is 457.2 meters (1500 feet) with somewhat heavy dyspnea.  During exercise, there was desaturation while breathing room air.  Both blood pressure and heart rate increased significantly with walking.  The patient reported non-pulmonary symptoms during exercise.  Since the previous study in October 2022, exercise capacity is unchanged.  Based upon age and body mass index, exercise capacity is normal.

## 2023-02-28 NOTE — H&P (VIEW-ONLY)
ADVANCED HEART FAILURE AND TRANSPLANTATION PULMONARY HYPERTENSION CONSULTATION    REFERRING PHYSICIAN:  Jessica Wilson MD  [address] [place] [address]      CHIEF COMPLAINT:  Evaluation of pulmonary hypertension    HISTORY OF PRESENT ILLNESS:  Yamel Shah is a 72 y.o. female with a past medical history remarkable for scleroderma, PAH and ILD    Co-morbidities: venous insufficiency     PAH specific therapies: Adcirca (tadalafil)    NYHA Functional Class II  Syncope: no history  Symptoms: SOB   Contraception Method: n/a    Notes SOB with ambulating stairs. Chronic 2 pillow orthopnea with elevated bed. Chronic abdominal bloating and early satiety for last two years. Notes chronic LE edema. She is not on any diuretic therapy. Notes occasional orthostatic symptoms. Notes dyspepsia and follows with GI. She is on rabeprazole. Denies PND, bendopnea, nausea, chest discomfort, presyncope, palpitations, or syncope. Denies adverse bleeding, no hematochezia/melena/hematuria/hematemesis.    PH History:  She was diagnosed with scleroderma in 1983, followed by Dr. Lim (and previously Dr. Boudreaux). She was on revatio in 2013, then switched to adcirca 40mg po daily, which she has remained on every since. RHC in January 2021 demonstrated stable PA pressures on monotherapy.     Last seen in PH clinic 2021 at which time stable with no changes for PH therapy. Nifedipine was increased to 120 mg daily with yearly follow-up recommended.    Followed by lung transplant for SSc-ILD with PAH.  Currently on MMF and tolerating well.  At last visit, noted FVC has been stable.  Slight decrease in DLCO recently however no desaturations on 6MWT. No OFEV due to her GI symptoms at baseline with plan to continue to monitor on routine visits. She follows with GI for GERD/scleroderma esophagus.      Diagnostic Studies  BNP 2/28/2023: 83    6MWT 2/28/2023: 457.5 meters     Echocardiography  Results for orders  placed during the hospital encounter of 12/30/22  · Moderate left atrial enlargement.  · Normal central venous pressure (3 mmHg).  · The estimated PA systolic pressure is 42 mmHg.  · The left ventricle is normal in size with  · The estimated ejection fraction is 67%.  · Indeterminate left ventricular diastolic function.  · Normal right ventricular size with normal right ventricular systolic function.    Right heart catheterization  Results for orders placed during the hospital encounter of 01/05/21    · The estimated blood loss was none.  · The filling pressures on the right and left were normal.  · Normal PA pressure  · Normal cardiac output and index by Humberto  · PVR is 0.9 JOLLEY    ECG 2/28/2023: SR 77 bpm, LAD, QRS 90 ms    PFTs:   Latest Reference Range & Units 10/11/22 02:08   Pre FVC 1.99 - 3.42 L 1.82 (L)   Pre FEV1 1.52 - 2.66 L 1.49 (L)   Pre FEV1 FVC 66.43 - 89.45 % 81.98   Pre TLC 4.12 - 6.09 L 3.20 (L)   Pre DLCO 16.17 - 27.64 ml/(min*mmHg) 11.89 (L)   DLCO ADJ PRE 16.17 - 27.64 ml/(min*mmHg) 12.12 (L)   (L): Data is abnormally low    CXR  12/2/2022  Heart size normal.  Chronic interstitial lung disease more marked on the right side.  No significant airspace consolidation or pleural effusion.  No significant changes    CT Chest  12/2020  1.  Overall stable appearing chronic interstitial lung disease consistent with patient's history of scleroderma.  Radiographic appearance is more typical for NSIP and UIP, noting that UIP is a more common disease.  2.  Previously characterized tree-in-bud opacity along the superior aspect of the right oblique fissure appears stable and may reflect chronic inflammatory process.  3.  0.4 cm solid right upper lobe pulmonary nodule, stable dating back to 09/18/2015.  Such stability favors benign etiology.  4.  Persistent fluid-filled esophagus as noted on multiple priors placing the patient at increased risk for aspiration.  This may be related to the patient's history of  scleroderma; clinical considerations will determine if further assessment of esophageal motility is warranted.    V/Q Scan 6/2017 negative    PAST MEDICAL HISTORY:  Past Medical History:   Diagnosis Date    Abnormal Pap smear     Acid reflux     Allergy     Arthritis     Encounter for blood transfusion     GERD (gastroesophageal reflux disease)     History of migraine headaches     Hx of colonic polyp     Hypertension     Idiopathic neuropathy 7/20/2012    ILD (interstitial lung disease) 11/6/2013    Iron deficiency anemia 3/18/2014    MRSA carrier     Osteopenia     Pneumonia     Pulmonary fibrosis     Pulmonary hypertension     Raynaud's disease     Scleroderma, diffuse     Sjogren's syndrome     Vitamin D deficiency 11/14/2013       PAST SURGICAL HISTORY:  Past Surgical History:   Procedure Laterality Date    24 HOUR IMPEDANCE PH MONITORING OF ESOPHAGUS IN PATIENT NOT TAKING ACID REDUCING MEDICATIONS N/A 3/4/2020    Procedure: IMPEDANCE PH STUDY, ESOPHAGEAL, 24 HOUR, IN PATIENT NOT TAKING ACID REDUCING MEDICATION;  Surgeon: Annamaria Mendoza MD;  Location: [facility] (4TH FLR);  Service: Endoscopy;  Laterality: N/A;  OFF PPI/H2 Blocker   Motility Studies   Hold Narcotics x 1 days   Hold TCA x 1 days  2/26 - LVM attempting to confirm appt  2/27 - Confirmed appt    ANORECTAL MANOMETRY N/A 5/10/2021    Procedure: MANOMETRY, ANORECTAL with balloon expulsion test;  Surgeon: Annamaria Mendoza MD;  Location: [facility] (4TH FLR);  Service: Endoscopy;  Laterality: N/A;  order combined  covid test 5/7 Jainism, instructions emailed-[facility]    BREAST BIOPSY      Left, benign    CERVICAL CONIZATION   W/ LASER  1970    COLONOSCOPY      COLONOSCOPY N/A 3/29/2019    Procedure: COLONOSCOPY;  Surgeon: Annamaria Mendoza MD;  Location: [facility] (2ND FLR);  Service: Endoscopy;  Laterality: N/A;    COLONOSCOPY N/A 5/10/2021    Procedure: COLONOSCOPY;  Surgeon: Annamaria Mendoza MD;  Location: [facility] (4TH FLR);  Service: Endoscopy;   Laterality: N/A;  2nd floor-previous scopes done on 2nd floor, gastroparesis  full liquid diet x2 days, clear liquid x1 day prior to procedure  covid test 5/7 Scientologist, instructions emailed-[facility]  5/6 pt confirmed appt-[facility]    DILATION AND CURETTAGE OF UTERUS      ESOPHAGEAL MANOMETRY WITH MEASUREMENT OF IMPEDANCE N/A 3/4/2020    Procedure: MANOMETRY, ESOPHAGUS, WITH IMPEDANCE MEASUREMENT;  Surgeon: Annamaria Mendoza MD;  Location: [facility] (4TH FLR);  Service: Endoscopy;  Laterality: N/A;  OFF PPI/H2 Blocker   Motility Studies   Hold Narcotics x 1 days   Hold TCA x 1 days    ESOPHAGOGASTRODUODENOSCOPY      ESOPHAGOGASTRODUODENOSCOPY N/A 3/29/2019    Procedure: EGD (ESOPHAGOGASTRODUODENOSCOPY);  Surgeon: Annamaria Mendoza MD;  Location: [facility] (2ND FLR);  Service: Endoscopy;  Laterality: N/A;  pulmonary htn    ESOPHAGOGASTRODUODENOSCOPY N/A 2/2/2021    Procedure: ESOPHAGOGASTRODUODENOSCOPY (EGD);  Surgeon: Annamaria Mendoza MD;  Location: [facility] (2ND FLR);  Service: Endoscopy;  Laterality: N/A;  2nd floor gastroparesis  3 days full liquid diet and 1 day clears  covid test 1/30 primary care, instructions sent to [facility]    ESOPHAGOGASTRODUODENOSCOPY N/A 7/1/2022    Procedure: ESOPHAGOGASTRODUODENOSCOPY (EGD);  Surgeon: Annamaria Mendoza MD;  Location: [facility] (2ND FLR);  Service: Endoscopy;  Laterality: N/A;  2nd floor-gastroparesis  full liquid diet x3 days, clear liquid diet x1 day prior to procedure  fully vaccinated, instructions sent to [email or web address]  6/29-pt confirmed new arrival time-[facility]    HYSTERECTOMY  1990    FRANDY (AUB, Fibroids), ovaries remain    RIGHT HEART CATHETERIZATION Right 1/5/2021    Procedure: INSERTION, CATHETER, RIGHT HEART;  Surgeon: Aureliano Sy MD;  Location: [facility] CATH LAB;  Service: Cardiology;  Laterality: Right;    VARICOSE VEIN SURGERY         SOCIAL HISTORY  Social History     Socioeconomic History    Marital status:      Spouse name: Lewis    Number of  children: 4   Occupational History    Occupation: -retired     Employer: [employer]     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 No    Breast-feeding No   Social History Narrative         Social Determinants of Health     Financial Resource Strain: Low Risk     Difficulty of Paying Living Expenses: Not very hard   Food Insecurity: No Food Insecurity    Worried About Running Out of Food in the Last Year: Never true    Ran Out of Food in the Last Year: Never true   Transportation Needs: No Transportation Needs    Lack of Transportation (Medical): No    Lack of Transportation (Non-Medical): No   Physical Activity: Inactive    Days of Exercise per Week: 0 days    Minutes of Exercise per Session: 0 min   Stress: Stress Concern Present    Feeling of Stress : To some extent   Social Connections: Moderately Isolated    Frequency of Communication with Friends and Family: More than three times a week    Frequency of Social Gatherings with Friends and Family: Never    Attends Judaism Services: Never    Active Member of Clubs or Organizations: No    Attends Club or Organization Meetings: Patient refused    Marital Status:    Housing Stability: Low Risk     Unable to Pay for Housing in the Last Year: No    Number of Places Lived in the Last Year: 1    Unstable Housing in the Last Year: No       FAMILY HISTORY  Family History   Problem Relation Age of Onset    Breast cancer Mother     Hypertension Father     Breast cancer Sister     Diabetes Sister     Osteoarthritis Brother     Diabetes Brother     No Known Problems Daughter     No Known Problems Daughter     No Known Problems Son     No  Known Problems Son     Breast cancer Maternal Aunt     Melanoma Neg Hx     Colon cancer Neg Hx     Crohn's disease Neg Hx     Stomach cancer Neg Hx     Ulcerative colitis Neg Hx     Rectal cancer Neg Hx     Irritable bowel syndrome Neg Hx     Esophageal cancer Neg Hx     Celiac disease Neg Hx     Ovarian cancer Neg Hx     Liver cancer Neg Hx     Pancreatic cancer Neg Hx      ALLERGIES:  Review of patient's allergies indicates:   Allergen Reactions    Sulfa (sulfonamide antibiotics)      Other reaction(s): Rash    Tramadol Itching       MEDICATIONS  Current Outpatient Medications on File Prior to Visit   Medication Sig Dispense Refill    albuterol (VENTOLIN HFA) 90 mcg/actuation inhaler Inhale 2 puffs into the lungs every 4 (four) hours as needed for Wheezing. Rescue 18 g 3    carboxymethylcellulose sodium (REFRESH TEARS) 0.5 % Drop Apply 1-2 drops to every as needed      ergocalciferol (ERGOCALCIFEROL) 50,000 unit Cap Take 1 capsule (50,000 Units total) by mouth every 7 days. 12 capsule 1    ferrous sulfate 325 (65 FE) MG EC tablet Take 325 mg by mouth 2 (two) times daily.      flu vac 2022 [id number]-[id number],PF, (FLUAD QUAD 2022-23,65Y UP,,PF,) 60 mcg (15 mcg x 4)/0.5 mL Syrg Inject into the muscle. 0.5 mL 0    multivitamin capsule Take 1 capsule by mouth once daily.      mycophenolate mofetil (CELLCEPT) 200 mg/mL SusR Take 5 mLs (1,000 mg total) by mouth 2 (two) times daily. 480 mL 1    nabumetone (RELAFEN) 750 MG tablet Take 1 tablet (750 mg total) by mouth 2 (two) times daily as needed for Pain. 60 tablet 3    NIFEdipine (ADALAT CC) 90 MG TbSR TAKE 1 TABLET(90 MG) BY MOUTH EVERY DAY 90 tablet 3    NIFEdipine (PROCARDIA-XL) 30 MG (OSM) 24 hr tablet TAKE 1 TABLET(30 MG) BY MOUTH EVERY DAY 30 tablet 11    pravastatin (PRAVACHOL) 20 MG tablet TAKE 1 TABLET(20 MG) BY MOUTH EVERY EVENING 90 tablet 2    pregabalin (LYRICA) 300 MG Cap Take 1 capsule (300 mg total) by mouth 2 (two) times daily. 60 capsule 6    PREVIDENT 5000  "BOOSTER PLUS 1.1 % Pste SMARTSIG:To Teeth      PREVIDENT 5000 SENSITIVE 1.1-5 % Pste BRUSH FOR 2 MINUTES TWICE PER DAY      RABEprazole (ACIPHEX) 20 mg tablet Take 1 tablet (20 mg total) by mouth 2 (two) times daily. 60 tablet 11    sars-cov-2, covid-19, (MODERNA COVID-19) 50 mcg/0.25 ml injection (BOOSTER) Inject into the muscle. 0.25 mL 0    sodium chloride 0.9% 0.9 % irrigation Irrigate with 2 mLs as directed daily as needed (wound cleaning). 3000 mL 0    tadalafil (ADCIRCA) 20 mg Tab Take 2 tablets (40 mg total) by mouth once daily. 28 tablet 11    tiZANidine (ZANAFLEX) 4 MG tablet Take 1 tablet (4 mg total) by mouth nightly as needed (muscle pain). 30 tablet 3     No current facility-administered medications on file prior to visit.       REVIEW OF SYSTEMS  As per HPI. All other ROS reviewed and negative.    PHYSICAL EXAM:    Vitals:    02/28/23 1304 02/28/23 1305   BP: 138/65 129/62   BP Location: Left arm Left arm   Patient Position: Sitting Standing   BP Method: Medium (Automatic) Medium (Automatic)   Pulse: 72 74   Weight: 71.4 kg (157 lb 6.5 oz)    Height: 5' 5" (1.651 m)     Body mass index is 26.19 kg/m².    GENERAL: Alert, NAD   HEENT: Anicteric sclerae  NECK: JVP not visible above level of clavicle while sitting upright and estimated ~6cmH20 reclining 45 degrees  CARDIOVASCULAR: Regular rate and rhythm. Normal S1, prominent S2 with 3/6 holosystolic murmur LSB  PULMONARY: Lungs clear to auscultation bilaterally  ABDOMEN: Soft, nontender, nondistended. No guarding, no rebound, no hepatomegaly  EXTREMITIES: Warm. No clubbing, cyanosis. 2+ bilateral LE edema pitting to below knees. No pre-sacral edema  VASCULAR: 2+ bilateral radial pulses  NEUROLOGIC: No focal deficits    LABS:    Lab Results   Component Value Date     02/28/2023    K 4.0 02/28/2023     (H) 02/28/2023    CO2 24 02/28/2023    BUN 15 02/28/2023    CREATININE 0.9 02/28/2023    CALCIUM 9.0 02/28/2023    ANIONGAP 8 02/28/2023    " EGFRNORACEVR >60.0 02/28/2023       Magnesium   Date Value Ref Range Status   02/28/2023 2.0 1.6 - 2.6 mg/dL Final       Lab Results   Component Value Date    WBC 7.31 02/28/2023    HGB 12.6 02/28/2023    HCT 39.9 02/28/2023    MCV 94 02/28/2023     02/28/2023         Lab Results   Component Value Date    INR 1.0 06/29/2015    INR 1.0 01/21/2013    INR 1.0 09/29/2007       BNP   Date Value Ref Range Status   02/28/2023 80 0 - 99 pg/mL Final     Comment:     Values of less than 100 pg/ml are consistent with non-CHF populations.   11/12/2021 80 0 - 99 pg/mL Final     Comment:     Values of less than 100 pg/ml are consistent with non-CHF populations.   07/26/2021 106 (H) 0 - 99 pg/mL Final     Comment:     Values of less than 100 pg/ml are consistent with non-CHF populations.       LD   Date Value Ref Range Status   04/11/2022 184 110 - 260 U/L Final     Comment:     Results are increased in hemolyzed samples.   03/25/2021 149 110 - 260 U/L Final     Comment:     Results are increased in hemolyzed samples.   11/24/2020 142 110 - 260 U/L Final     Comment:     Results are increased in hemolyzed samples.         Chest x-ray:      IMPRESSION:    WHO Diagnostic Group 1 pulmonary arterial hypertension   WHO Functional Class   REVEAL 2.0 Risk Score: 4 (without RHC data)      1. Pulmonary hypertension    2. ILD (interstitial lung disease)    3. Raynaud's disease without gangrene    4. PVD (peripheral vascular disease)    5. Calcification of aorta    6. Essential hypertension    7. Scleroderma with pulmonary involvement    8. Cutaneous scleroderma    9. Immunosuppression due to drug therapy    10. Iron deficiency anemia, unspecified iron deficiency anemia type    11. Gastroesophageal reflux disease, unspecified whether esophagitis present    12. Dysphagia, unspecified type    13. Osteopenia of multiple sites            PLAN:    Will get RHC to follow this year. Does have ILD associated with above CTD. Follows with  transplant pulmonology, GI, rheumatology. Remains on tadalafil monotherapy.     Recommend 2 gram sodium restriction and 1500 mL fluid restriction.  Encourage physical activity with graded exercise program.  Requested patient to weigh themselves daily, and to notify us if their weight increases by more than 3 lbs in 1 day or 5 lbs in 1 week.     Pt to call us with more shortness of breath, swelling or unexpected weight changes, fever, chills, bloody or black bowel movements, or other concerns.    F/U 1 year      Electronically signed by:   Winifred Cordon   02/28/2023 12:11 PM

## 2023-02-28 NOTE — LETTER
February 28, 2023        Luis Ahuja  [address] [facility] [address]  Phone: [phone]  Fax: [phone]             [facility]  [address]  [facility] [address]  Phone: [phone]   Patient: Yamel Shah   MR Number: [id number]   YOB: 1950   Date of Visit: 2/28/2023       Dear Dr. Luis Ahuja    Thank you for referring Yamel Shah to me for evaluation. Attached you will find relevant portions of my assessment and plan of care.    If you have questions, please do not hesitate to call me. I look forward to following Yamel Shah along with you.    Sincerely,    Winifred Cordon, DO    Enclosure    If you would like to receive this communication electronically, please contact [email or web address] or [phone] to request navabi Link access.    nav[employer] Link is a tool which provides read-only access to select patient information with whom you have a relationship. Its easy to use and provides real time access to review your patients record including encounter summaries, notes, results, and demographic information.    If you feel you have received this communication in error or would no longer like to receive these types of communications, please e-mail [email or web address]

## 2023-02-28 NOTE — PROGRESS NOTES
02/28/2023   Yamel Moy Pablo  [address]        OUTPATIENT RIGHT HEART CATHETERIZATION INSTRUCTIONS     You have been scheduled for a procedure in the catheterization lab on 3/16/2023.      Please report to the Cardiology Waiting Area on the Third floor of the hospital and check in at 11:30 am.    You will then be taken to the SSCU (Short Stay Cardiac Unit) and prepared for your procedure. Please be aware that this is not the time of your procedure but the time you are to arrive. The procedures are scheduled on an hourly basis; however, emergency cases take precedence over all other cases.      You may eat a light meal before your procedure.    You may take your regular morning medications with water. If there are any medications that you should not take you will be instructed to hold them that morning.   If you are on Pradaxa, Eliquis or Coumadin , you can hold them 3 days prior to your procedure.  CALL US if you are on blood thinners for instructions. [phone]  Do not stop your Aspirin, Plavix, Effient, or Brilinta.    The procedure will take 30 minutes to perform. After the procedure, you will return to SSCU on the third floor of the hospital. Your family may remain in the room with you during this time.     You will be monitored for bleeding from the puncture site.  Your dressing may be removed the next day and dressed with a Band-Aid.  You may be able to be discharged home that same afternoon if there is someone to drive you home and there were no complications.     You may need to be admitted to the hospital after your procedure, so pack an overnight bag. Your doctor will determine, based on your progress, the date and time of your discharge. The results of your procedure will be discussed with you before you are discharged. Any further testing or procedures will be scheduled for you either before you leave or you will be called with these appointments.      If you should have any  questions, concerns, or need to change the date of your procedure, please call  Heart Transplant office and ask for your nurse. [phone]    Special Instructions:     THE ABOVE INSTRUCTIONS WERE GIVEN TO THE PATIENT VERBALLY AND THEY VERBALIZED UNDERSTANDING.  THEY DO NOT REQUIRE ANY SPECIAL NEEDS AND DO NOT HAVE ANY LEARNING BARRIERS.     Directions for Reporting to Cardiology Waiting Area in the Hospital  If you park in the Parking Garage:  Take elevators to the1st floor of the parking garage.  Continue past the gift shop, coffee shop, and piano.  Take a right and go to the gold elevators. (Elevator B)  Take the elevator to the 3rd floor.  Follow the arrow on the sign on the wall that says Cath Lab Registration/EP Lab Registration.  Follow the long hallway all the way around until you come to a big open area.  This is the registration area.  Check in at Reception Desk.     OR     If family is dropping you off:  Have them drop you off at the front of the Hospital under the green overhang.  Enter through the doors and take a right.  Take the E elevators to the 3rd floor Cardiology Waiting Area.  Check in at the Reception Desk in the waiting room.

## 2023-02-28 NOTE — PROGRESS NOTES
ADVANCED HEART FAILURE AND TRANSPLANTATION PULMONARY HYPERTENSION CONSULTATION    REFERRING PHYSICIAN:  Jessica Wilson MD  [address] [place] [address]      CHIEF COMPLAINT:  Evaluation of pulmonary hypertension    HISTORY OF PRESENT ILLNESS:  Yamel Shah is a 72 y.o. female with a past medical history remarkable for scleroderma, PAH and ILD    Co-morbidities: venous insufficiency     PAH specific therapies: Adcirca (tadalafil)    NYHA Functional Class II  Syncope: no history  Symptoms: SOB   Contraception Method: n/a    Notes SOB with ambulating stairs. Chronic 2 pillow orthopnea with elevated bed. Chronic abdominal bloating and early satiety for last two years. Notes chronic LE edema. She is not on any diuretic therapy. Notes occasional orthostatic symptoms. Notes dyspepsia and follows with GI. She is on rabeprazole. Denies PND, bendopnea, nausea, chest discomfort, presyncope, palpitations, or syncope. Denies adverse bleeding, no hematochezia/melena/hematuria/hematemesis.    PH History:  She was diagnosed with scleroderma in 1983, followed by Dr. Lim (and previously Dr. Boudreaux). She was on revatio in 2013, then switched to adcirca 40mg po daily, which she has remained on every since. RHC in January 2021 demonstrated stable PA pressures on monotherapy.     Last seen in PH clinic 2021 at which time stable with no changes for PH therapy. Nifedipine was increased to 120 mg daily with yearly follow-up recommended.    Followed by lung transplant for SSc-ILD with PAH.  Currently on MMF and tolerating well.  At last visit, noted FVC has been stable.  Slight decrease in DLCO recently however no desaturations on 6MWT. No OFEV due to her GI symptoms at baseline with plan to continue to monitor on routine visits. She follows with GI for GERD/scleroderma esophagus.      Diagnostic Studies  BNP 2/28/2023: 83    6MWT 2/28/2023: 457.5 meters     Echocardiography  Results for orders  placed during the hospital encounter of 12/30/22  · Moderate left atrial enlargement.  · Normal central venous pressure (3 mmHg).  · The estimated PA systolic pressure is 42 mmHg.  · The left ventricle is normal in size with  · The estimated ejection fraction is 67%.  · Indeterminate left ventricular diastolic function.  · Normal right ventricular size with normal right ventricular systolic function.    Right heart catheterization  Results for orders placed during the hospital encounter of 01/05/21    · The estimated blood loss was none.  · The filling pressures on the right and left were normal.  · Normal PA pressure  · Normal cardiac output and index by Humberto  · PVR is 0.9 JOLLEY    ECG 2/28/2023: SR 77 bpm, LAD, QRS 90 ms    PFTs:   Latest Reference Range & Units 10/11/22 02:08   Pre FVC 1.99 - 3.42 L 1.82 (L)   Pre FEV1 1.52 - 2.66 L 1.49 (L)   Pre FEV1 FVC 66.43 - 89.45 % 81.98   Pre TLC 4.12 - 6.09 L 3.20 (L)   Pre DLCO 16.17 - 27.64 ml/(min*mmHg) 11.89 (L)   DLCO ADJ PRE 16.17 - 27.64 ml/(min*mmHg) 12.12 (L)   (L): Data is abnormally low    CXR  12/2/2022  Heart size normal.  Chronic interstitial lung disease more marked on the right side.  No significant airspace consolidation or pleural effusion.  No significant changes    CT Chest  12/2020  1.  Overall stable appearing chronic interstitial lung disease consistent with patient's history of scleroderma.  Radiographic appearance is more typical for NSIP and UIP, noting that UIP is a more common disease.  2.  Previously characterized tree-in-bud opacity along the superior aspect of the right oblique fissure appears stable and may reflect chronic inflammatory process.  3.  0.4 cm solid right upper lobe pulmonary nodule, stable dating back to 09/18/2015.  Such stability favors benign etiology.  4.  Persistent fluid-filled esophagus as noted on multiple priors placing the patient at increased risk for aspiration.  This may be related to the patient's history of  scleroderma; clinical considerations will determine if further assessment of esophageal motility is warranted.    V/Q Scan 6/2017 negative    PAST MEDICAL HISTORY:  Past Medical History:   Diagnosis Date    Abnormal Pap smear     Acid reflux     Allergy     Arthritis     Encounter for blood transfusion     GERD (gastroesophageal reflux disease)     History of migraine headaches     Hx of colonic polyp     Hypertension     Idiopathic neuropathy 7/20/2012    ILD (interstitial lung disease) 11/6/2013    Iron deficiency anemia 3/18/2014    MRSA carrier     Osteopenia     Pneumonia     Pulmonary fibrosis     Pulmonary hypertension     Raynaud's disease     Scleroderma, diffuse     Sjogren's syndrome     Vitamin D deficiency 11/14/2013       PAST SURGICAL HISTORY:  Past Surgical History:   Procedure Laterality Date    24 HOUR IMPEDANCE PH MONITORING OF ESOPHAGUS IN PATIENT NOT TAKING ACID REDUCING MEDICATIONS N/A 3/4/2020    Procedure: IMPEDANCE PH STUDY, ESOPHAGEAL, 24 HOUR, IN PATIENT NOT TAKING ACID REDUCING MEDICATION;  Surgeon: Annamaria Mendoza MD;  Location: [facility] (4TH FLR);  Service: Endoscopy;  Laterality: N/A;  OFF PPI/H2 Blocker   Motility Studies   Hold Narcotics x 1 days   Hold TCA x 1 days  2/26 - LVM attempting to confirm appt  2/27 - Confirmed appt    ANORECTAL MANOMETRY N/A 5/10/2021    Procedure: MANOMETRY, ANORECTAL with balloon expulsion test;  Surgeon: Annamaria Mendoza MD;  Location: [facility] (4TH FLR);  Service: Endoscopy;  Laterality: N/A;  order combined  covid test 5/7 [facility], instructions emailed-[facility]    BREAST BIOPSY      Left, benign    CERVICAL CONIZATION   W/ LASER  1970    COLONOSCOPY      COLONOSCOPY N/A 3/29/2019    Procedure: COLONOSCOPY;  Surgeon: Annamaria Mendoza MD;  Location: [facility] (2ND FLR);  Service: Endoscopy;  Laterality: N/A;    COLONOSCOPY N/A 5/10/2021    Procedure: COLONOSCOPY;  Surgeon: Annamaria Mendoza MD;  Location: [facility] (4TH FLR);  Service: Endoscopy;   Laterality: N/A;  2nd floor-previous scopes done on 2nd floor, gastroparesis  full liquid diet x2 days, clear liquid x1 day prior to procedure  covid test 5/7 [facility], instructions emailed-[facility]  5/6 pt confirmed appt-[facility]    DILATION AND CURETTAGE OF UTERUS      ESOPHAGEAL MANOMETRY WITH MEASUREMENT OF IMPEDANCE N/A 3/4/2020    Procedure: MANOMETRY, ESOPHAGUS, WITH IMPEDANCE MEASUREMENT;  Surgeon: Annamaria Mendoza MD;  Location: [facility] (4TH FLR);  Service: Endoscopy;  Laterality: N/A;  OFF PPI/H2 Blocker   Motility Studies   Hold Narcotics x 1 days   Hold TCA x 1 days    ESOPHAGOGASTRODUODENOSCOPY      ESOPHAGOGASTRODUODENOSCOPY N/A 3/29/2019    Procedure: EGD (ESOPHAGOGASTRODUODENOSCOPY);  Surgeon: Annamaria Mendoza MD;  Location: [facility] (2ND FLR);  Service: Endoscopy;  Laterality: N/A;  pulmonary htn    ESOPHAGOGASTRODUODENOSCOPY N/A 2/2/2021    Procedure: ESOPHAGOGASTRODUODENOSCOPY (EGD);  Surgeon: Annamaria Mendoza MD;  Location: [facility] (2ND FLR);  Service: Endoscopy;  Laterality: N/A;  2nd floor gastroparesis  3 days full liquid diet and 1 day clears  covid test 1/30 primary care, instructions sent to [facility]    ESOPHAGOGASTRODUODENOSCOPY N/A 7/1/2022    Procedure: ESOPHAGOGASTRODUODENOSCOPY (EGD);  Surgeon: Annamaria Mendoza MD;  Location: [facility] (2ND FLR);  Service: Endoscopy;  Laterality: N/A;  2nd floor-gastroparesis  full liquid diet x3 days, clear liquid diet x1 day prior to procedure  fully vaccinated, instructions sent to [facility]  6/29-pt confirmed new arrival time-[facility]    HYSTERECTOMY  1990    FRANDY (AUB, Fibroids), ovaries remain    RIGHT HEART CATHETERIZATION Right 1/5/2021    Procedure: INSERTION, CATHETER, RIGHT HEART;  Surgeon: Aureliano Sy MD;  Location: [facility] CATH LAB;  Service: Cardiology;  Laterality: Right;    VARICOSE VEIN SURGERY         SOCIAL HISTORY  Social History     Socioeconomic History    Marital status:      Spouse name: Lewis    Number of  children: 4   Occupational History    Occupation: -retired     Employer: [employer]     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 No    Breast-feeding No   Social History Narrative         Social Determinants of Health     Financial Resource Strain: Low Risk     Difficulty of Paying Living Expenses: Not very hard   Food Insecurity: No Food Insecurity    Worried About Running Out of Food in the Last Year: Never true    Ran Out of Food in the Last Year: Never true   Transportation Needs: No Transportation Needs    Lack of Transportation (Medical): No    Lack of Transportation (Non-Medical): No   Physical Activity: Inactive    Days of Exercise per Week: 0 days    Minutes of Exercise per Session: 0 min   Stress: Stress Concern Present    Feeling of Stress : To some extent   Social Connections: Moderately Isolated    Frequency of Communication with Friends and Family: More than three times a week    Frequency of Social Gatherings with Friends and Family: Never    Attends Latter day Services: Never    Active Member of Clubs or Organizations: No    Attends Club or Organization Meetings: Patient refused    Marital Status:    Housing Stability: Low Risk     Unable to Pay for Housing in the Last Year: No    Number of Places Lived in the Last Year: 1    Unstable Housing in the Last Year: No       FAMILY HISTORY  Family History   Problem Relation Age of Onset    Breast cancer Mother     Hypertension Father     Breast cancer Sister     Diabetes Sister     Osteoarthritis Brother     Diabetes Brother     No Known Problems Daughter     No Known Problems Daughter     No Known Problems Son     No  Known Problems Son     Breast cancer Maternal Aunt     Melanoma Neg Hx     Colon cancer Neg Hx     Crohn's disease Neg Hx     Stomach cancer Neg Hx     Ulcerative colitis Neg Hx     Rectal cancer Neg Hx     Irritable bowel syndrome Neg Hx     Esophageal cancer Neg Hx     Celiac disease Neg Hx     Ovarian cancer Neg Hx     Liver cancer Neg Hx     Pancreatic cancer Neg Hx      ALLERGIES:  Review of patient's allergies indicates:   Allergen Reactions    Sulfa (sulfonamide antibiotics)      Other reaction(s): Rash    Tramadol Itching       MEDICATIONS  Current Outpatient Medications on File Prior to Visit   Medication Sig Dispense Refill    albuterol (VENTOLIN HFA) 90 mcg/actuation inhaler Inhale 2 puffs into the lungs every 4 (four) hours as needed for Wheezing. Rescue 18 g 3    carboxymethylcellulose sodium (REFRESH TEARS) 0.5 % Drop Apply 1-2 drops to every as needed      ergocalciferol (ERGOCALCIFEROL) 50,000 unit Cap Take 1 capsule (50,000 Units total) by mouth every 7 days. 12 capsule 1    ferrous sulfate 325 (65 FE) MG EC tablet Take 325 mg by mouth 2 (two) times daily.      flu vac 2022 [id number],PF, (FLUAD QUAD 2022-23,65Y UP,,PF,) 60 mcg (15 mcg x 4)/0.5 mL Syrg Inject into the muscle. 0.5 mL 0    multivitamin capsule Take 1 capsule by mouth once daily.      mycophenolate mofetil (CELLCEPT) 200 mg/mL SusR Take 5 mLs (1,000 mg total) by mouth 2 (two) times daily. 480 mL 1    nabumetone (RELAFEN) 750 MG tablet Take 1 tablet (750 mg total) by mouth 2 (two) times daily as needed for Pain. 60 tablet 3    NIFEdipine (ADALAT CC) 90 MG TbSR TAKE 1 TABLET(90 MG) BY MOUTH EVERY DAY 90 tablet 3    NIFEdipine (PROCARDIA-XL) 30 MG (OSM) 24 hr tablet TAKE 1 TABLET(30 MG) BY MOUTH EVERY DAY 30 tablet 11    pravastatin (PRAVACHOL) 20 MG tablet TAKE 1 TABLET(20 MG) BY MOUTH EVERY EVENING 90 tablet 2    pregabalin (LYRICA) 300 MG Cap Take 1 capsule (300 mg total) by mouth 2 (two) times daily. 60 capsule 6    PREVIDENT 5000  "BOOSTER PLUS 1.1 % Pste SMARTSIG:To Teeth      PREVIDENT 5000 SENSITIVE 1.1-5 % Pste BRUSH FOR 2 MINUTES TWICE PER DAY      RABEprazole (ACIPHEX) 20 mg tablet Take 1 tablet (20 mg total) by mouth 2 (two) times daily. 60 tablet 11    sars-cov-2, covid-19, (MODERNA COVID-19) 50 mcg/0.25 ml injection (BOOSTER) Inject into the muscle. 0.25 mL 0    sodium chloride 0.9% 0.9 % irrigation Irrigate with 2 mLs as directed daily as needed (wound cleaning). 3000 mL 0    tadalafil (ADCIRCA) 20 mg Tab Take 2 tablets (40 mg total) by mouth once daily. 28 tablet 11    tiZANidine (ZANAFLEX) 4 MG tablet Take 1 tablet (4 mg total) by mouth nightly as needed (muscle pain). 30 tablet 3     No current facility-administered medications on file prior to visit.       REVIEW OF SYSTEMS  As per HPI. All other ROS reviewed and negative.    PHYSICAL EXAM:    Vitals:    02/28/23 1304 02/28/23 1305   BP: 138/65 129/62   BP Location: Left arm Left arm   Patient Position: Sitting Standing   BP Method: Medium (Automatic) Medium (Automatic)   Pulse: 72 74   Weight: 71.4 kg (157 lb 6.5 oz)    Height: 5' 5" (1.651 m)     Body mass index is 26.19 kg/m².    GENERAL: Alert, NAD   HEENT: Anicteric sclerae  NECK: JVP not visible above level of clavicle while sitting upright and estimated ~6cmH20 reclining 45 degrees  CARDIOVASCULAR: Regular rate and rhythm. Normal S1, prominent S2 with 3/6 holosystolic murmur LSB  PULMONARY: Lungs clear to auscultation bilaterally  ABDOMEN: Soft, nontender, nondistended. No guarding, no rebound, no hepatomegaly  EXTREMITIES: Warm. No clubbing, cyanosis. 2+ bilateral LE edema pitting to below knees. No pre-sacral edema  VASCULAR: 2+ bilateral radial pulses  NEUROLOGIC: No focal deficits    LABS:    Lab Results   Component Value Date     02/28/2023    K 4.0 02/28/2023     (H) 02/28/2023    CO2 24 02/28/2023    BUN 15 02/28/2023    CREATININE 0.9 02/28/2023    CALCIUM 9.0 02/28/2023    ANIONGAP 8 02/28/2023    " EGFRNORACEVR >60.0 02/28/2023       Magnesium   Date Value Ref Range Status   02/28/2023 2.0 1.6 - 2.6 mg/dL Final       Lab Results   Component Value Date    WBC 7.31 02/28/2023    HGB 12.6 02/28/2023    HCT 39.9 02/28/2023    MCV 94 02/28/2023     02/28/2023         Lab Results   Component Value Date    INR 1.0 06/29/2015    INR 1.0 01/21/2013    INR 1.0 09/29/2007       BNP   Date Value Ref Range Status   02/28/2023 80 0 - 99 pg/mL Final     Comment:     Values of less than 100 pg/ml are consistent with non-CHF populations.   11/12/2021 80 0 - 99 pg/mL Final     Comment:     Values of less than 100 pg/ml are consistent with non-CHF populations.   07/26/2021 106 (H) 0 - 99 pg/mL Final     Comment:     Values of less than 100 pg/ml are consistent with non-CHF populations.       LD   Date Value Ref Range Status   04/11/2022 184 110 - 260 U/L Final     Comment:     Results are increased in hemolyzed samples.   03/25/2021 149 110 - 260 U/L Final     Comment:     Results are increased in hemolyzed samples.   11/24/2020 142 110 - 260 U/L Final     Comment:     Results are increased in hemolyzed samples.         Chest x-ray:      IMPRESSION:    WHO Diagnostic Group 1 pulmonary arterial hypertension   WHO Functional Class   REVEAL 2.0 Risk Score: 4 (without RHC data)      1. Pulmonary hypertension    2. ILD (interstitial lung disease)    3. Raynaud's disease without gangrene    4. PVD (peripheral vascular disease)    5. Calcification of aorta    6. Essential hypertension    7. Scleroderma with pulmonary involvement    8. Cutaneous scleroderma    9. Immunosuppression due to drug therapy    10. Iron deficiency anemia, unspecified iron deficiency anemia type    11. Gastroesophageal reflux disease, unspecified whether esophagitis present    12. Dysphagia, unspecified type    13. Osteopenia of multiple sites            PLAN:    Will get RHC to follow this year. Does have ILD associated with above CTD. Follows with  transplant pulmonology, GI, rheumatology. Remains on tadalafil monotherapy.     Recommend 2 gram sodium restriction and 1500 mL fluid restriction.  Encourage physical activity with graded exercise program.  Requested patient to weigh themselves daily, and to notify us if their weight increases by more than 3 lbs in 1 day or 5 lbs in 1 week.     Pt to call us with more shortness of breath, swelling or unexpected weight changes, fever, chills, bloody or black bowel movements, or other concerns.    F/U 1 year      Electronically signed by:   Winifred Cordon   02/28/2023 12:11 PM

## 2023-03-13 ENCOUNTER — LAB VISIT (OUTPATIENT)
Dept: LAB | Facility: HOSPITAL | Age: 73
End: 2023-03-13
Attending: STUDENT IN AN ORGANIZED HEALTH CARE EDUCATION/TRAINING PROGRAM
Payer: MEDICARE

## 2023-03-13 ENCOUNTER — OFFICE VISIT (OUTPATIENT)
Dept: RHEUMATOLOGY | Facility: CLINIC | Age: 73
End: 2023-03-13
Payer: MEDICARE

## 2023-03-13 VITALS
HEIGHT: 65 IN | HEART RATE: 71 BPM | WEIGHT: 155 LBS | DIASTOLIC BLOOD PRESSURE: 63 MMHG | BODY MASS INDEX: 25.83 KG/M2 | SYSTOLIC BLOOD PRESSURE: 121 MMHG

## 2023-03-13 DIAGNOSIS — M85.80 OSTEOPENIA, UNSPECIFIED LOCATION: ICD-10-CM

## 2023-03-13 DIAGNOSIS — M79.89 OTHER SPECIFIED SOFT TISSUE DISORDERS: ICD-10-CM

## 2023-03-13 DIAGNOSIS — J84.9 ILD (INTERSTITIAL LUNG DISEASE): ICD-10-CM

## 2023-03-13 DIAGNOSIS — I27.20 PULMONARY HYPERTENSION: ICD-10-CM

## 2023-03-13 DIAGNOSIS — L97.529 SKIN ULCERS OF BOTH FEET: ICD-10-CM

## 2023-03-13 DIAGNOSIS — Z79.899 OTHER LONG TERM (CURRENT) DRUG THERAPY: ICD-10-CM

## 2023-03-13 DIAGNOSIS — D64.9 ANEMIA, UNSPECIFIED TYPE: ICD-10-CM

## 2023-03-13 DIAGNOSIS — M34.9 SCLERODERMA: Primary | ICD-10-CM

## 2023-03-13 DIAGNOSIS — L97.519 SKIN ULCERS OF BOTH FEET: ICD-10-CM

## 2023-03-13 DIAGNOSIS — R19.09 RIGHT GROIN MASS: ICD-10-CM

## 2023-03-13 DIAGNOSIS — M34.9 SCLERODERMA: ICD-10-CM

## 2023-03-13 DIAGNOSIS — B99.9 RECURRENT INFECTIONS: ICD-10-CM

## 2023-03-13 DIAGNOSIS — I87.2 VENOUS INSUFFICIENCY OF BOTH LOWER EXTREMITIES: ICD-10-CM

## 2023-03-13 LAB
ALBUMIN SERPL BCP-MCNC: 3.5 G/DL (ref 3.5–5.2)
ALP SERPL-CCNC: 103 U/L (ref 55–135)
ALT SERPL W/O P-5'-P-CCNC: 9 U/L (ref 10–44)
ANION GAP SERPL CALC-SCNC: 13 MMOL/L (ref 8–16)
AST SERPL-CCNC: 17 U/L (ref 10–40)
BASOPHILS # BLD AUTO: 0.03 K/UL (ref 0–0.2)
BASOPHILS NFR BLD: 0.8 % (ref 0–1.9)
BILIRUB SERPL-MCNC: 0.3 MG/DL (ref 0.1–1)
BUN SERPL-MCNC: 16 MG/DL (ref 8–23)
CALCIUM SERPL-MCNC: 9.1 MG/DL (ref 8.7–10.5)
CHLORIDE SERPL-SCNC: 106 MMOL/L (ref 95–110)
CO2 SERPL-SCNC: 23 MMOL/L (ref 23–29)
CREAT SERPL-MCNC: 0.7 MG/DL (ref 0.5–1.4)
CRP SERPL-MCNC: 27.4 MG/L (ref 0–8.2)
DIFFERENTIAL METHOD: ABNORMAL
EOSINOPHIL # BLD AUTO: 0.1 K/UL (ref 0–0.5)
EOSINOPHIL NFR BLD: 3.5 % (ref 0–8)
ERYTHROCYTE [DISTWIDTH] IN BLOOD BY AUTOMATED COUNT: 15.2 % (ref 11.5–14.5)
ERYTHROCYTE [SEDIMENTATION RATE] IN BLOOD BY PHOTOMETRIC METHOD: 69 MM/HR (ref 0–36)
EST. GFR  (NO RACE VARIABLE): >60 ML/MIN/1.73 M^2
GLUCOSE SERPL-MCNC: 90 MG/DL (ref 70–110)
HCT VFR BLD AUTO: 37.8 % (ref 37–48.5)
HGB BLD-MCNC: 12.4 G/DL (ref 12–16)
IGA SERPL-MCNC: 508 MG/DL (ref 40–350)
IGG SERPL-MCNC: 2277 MG/DL (ref 650–1600)
IGM SERPL-MCNC: 102 MG/DL (ref 50–300)
IMM GRANULOCYTES # BLD AUTO: 0.01 K/UL (ref 0–0.04)
IMM GRANULOCYTES NFR BLD AUTO: 0.3 % (ref 0–0.5)
LYMPHOCYTES # BLD AUTO: 1.2 K/UL (ref 1–4.8)
LYMPHOCYTES NFR BLD: 31.4 % (ref 18–48)
MCH RBC QN AUTO: 31 PG (ref 27–31)
MCHC RBC AUTO-ENTMCNC: 32.8 G/DL (ref 32–36)
MCV RBC AUTO: 95 FL (ref 82–98)
MONOCYTES # BLD AUTO: 0.3 K/UL (ref 0.3–1)
MONOCYTES NFR BLD: 7.3 % (ref 4–15)
NEUTROPHILS # BLD AUTO: 2.1 K/UL (ref 1.8–7.7)
NEUTROPHILS NFR BLD: 56.7 % (ref 38–73)
NRBC BLD-RTO: 0 /100 WBC
PLATELET # BLD AUTO: 247 K/UL (ref 150–450)
PMV BLD AUTO: 11.4 FL (ref 9.2–12.9)
POTASSIUM SERPL-SCNC: 4 MMOL/L (ref 3.5–5.1)
PROT SERPL-MCNC: 8.3 G/DL (ref 6–8.4)
RBC # BLD AUTO: 4 M/UL (ref 4–5.4)
SODIUM SERPL-SCNC: 142 MMOL/L (ref 136–145)
WBC # BLD AUTO: 3.69 K/UL (ref 3.9–12.7)

## 2023-03-13 PROCEDURE — 99999 PR PBB SHADOW E&M-EST. PATIENT-LVL V: ICD-10-PCS | Mod: PBBFAC,GC,TXP, | Performed by: STUDENT IN AN ORGANIZED HEALTH CARE EDUCATION/TRAINING PROGRAM

## 2023-03-13 PROCEDURE — 86140 C-REACTIVE PROTEIN: CPT | Mod: TXP | Performed by: STUDENT IN AN ORGANIZED HEALTH CARE EDUCATION/TRAINING PROGRAM

## 2023-03-13 PROCEDURE — 99999 PR PBB SHADOW E&M-EST. PATIENT-LVL V: CPT | Mod: PBBFAC,GC,TXP, | Performed by: STUDENT IN AN ORGANIZED HEALTH CARE EDUCATION/TRAINING PROGRAM

## 2023-03-13 PROCEDURE — 99214 OFFICE O/P EST MOD 30 MIN: CPT | Mod: S$PBB,GC,NTX, | Performed by: STUDENT IN AN ORGANIZED HEALTH CARE EDUCATION/TRAINING PROGRAM

## 2023-03-13 PROCEDURE — 82784 ASSAY IGA/IGD/IGG/IGM EACH: CPT | Mod: NTX | Performed by: STUDENT IN AN ORGANIZED HEALTH CARE EDUCATION/TRAINING PROGRAM

## 2023-03-13 PROCEDURE — 99214 PR OFFICE/OUTPT VISIT, EST, LEVL IV, 30-39 MIN: ICD-10-PCS | Mod: S$PBB,GC,NTX, | Performed by: STUDENT IN AN ORGANIZED HEALTH CARE EDUCATION/TRAINING PROGRAM

## 2023-03-13 PROCEDURE — 85652 RBC SED RATE AUTOMATED: CPT | Mod: NTX | Performed by: STUDENT IN AN ORGANIZED HEALTH CARE EDUCATION/TRAINING PROGRAM

## 2023-03-13 PROCEDURE — 85025 COMPLETE CBC W/AUTO DIFF WBC: CPT | Mod: TXP | Performed by: STUDENT IN AN ORGANIZED HEALTH CARE EDUCATION/TRAINING PROGRAM

## 2023-03-13 PROCEDURE — 99215 OFFICE O/P EST HI 40 MIN: CPT | Mod: PBBFAC,NTX | Performed by: STUDENT IN AN ORGANIZED HEALTH CARE EDUCATION/TRAINING PROGRAM

## 2023-03-13 PROCEDURE — 36415 COLL VENOUS BLD VENIPUNCTURE: CPT | Mod: NTX | Performed by: STUDENT IN AN ORGANIZED HEALTH CARE EDUCATION/TRAINING PROGRAM

## 2023-03-13 PROCEDURE — 80053 COMPREHEN METABOLIC PANEL: CPT | Mod: TXP | Performed by: STUDENT IN AN ORGANIZED HEALTH CARE EDUCATION/TRAINING PROGRAM

## 2023-03-13 NOTE — PROGRESS NOTES
I have personally reviewed the history, confirmed exam findings, and discussed assessment and plan with fellow.      1. Scleroderma _+Scl-70 +SS-A mRSS 23   2. Pulmonary hypertension  to have RHC 3/16/23   3. ILD (interstitial lung disease)    4. Low bone mass DXA 10.21.22 FRAX hip 2% MOP 9.1%   5. Skin ulcers of both feet    6. Anemia, unspecified type    7. Other long term (current) drug therapy     8. Venous insufficiency of both lower extremities to see Dr. Claudio in Vasc Surg after EVLT study   9. Right groin mass    10. Other specified soft tissue disorders    11. Recurrent infections    12. Pagan's esophagus  13. To have abdominal wall hernia repair  14. Right groin mass      Ultrasound right groin, if abnormal, consider excision at time of hernia surgery  Will resume mycophenolate 1 wk post  hernia surgery  Await RHC 3/16/23  PFTs scheduled  F/u Dr. Leigh in Pulmonary in  May   Immunoglobulins, ref to Allergy-Immunology for recurrent infection eval.Answers submitted by the patient for this visit:  Rheumatology Questionnaire (Submitted on 3/6/2023)  fever: No  eye redness: No  mouth sores: No  headaches: No  shortness of breath: Yes  chest pain: No  trouble swallowing: Yes  diarrhea: No  constipation: No  unexpected weight change: No  genital sore: No  dysuria: No  During the last 3 days, have you had a skin rash?: No  Bruises or bleeds easily: No  cough: Yes

## 2023-03-13 NOTE — PROGRESS NOTES
Subjective:       Patient ID: Yamel Shah is a 72 y.o. female.    Chief Complaint: Systemic scleroderma disease    HPI     Yamel Shah is a 72 y.o. 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 She was originally diagnosed in 1983 and has been seeing Dr. Ahuja for over 10 years.( +SSA, +SCL-70, -RNAP3, ILD). She has been on MMF since 2019. Used to follow with Dr. Leigh in pulmonology but now follows with Dr. Merlos. Additionally, she has chronic ulcers in her feet and sees podiatry and vascular. She is s/p laser venous ablation on right foot in 12/2020 by Dr. Claudio. She follows with Dr. Martins in Cardiology and had a right heart cath 1/2021 which showed normal PA pressures. She takes tadalafil. Her last echo was in 9/2020 with indeteriminate diasystolic dysfunction and PA 40 mmhg.     Interval History:  3/13/23:  Reports that she 3 upper respiratory infections within the last 3 months.  She reports that she had rhino virus back in 12/2022 and was treated with antibiotics per her pulmonologist at that time.  Additionally, in January, when she was visiting her family in [place], she again developed symptoms of an upper respiratory infection.  She went to the ED and was prescribed Augmentin and azithromycin for presumed pneumonia.  Most recently, she was seen in urgent care on [facility] for similar symptoms upper respiratory infection with chills and myalgias and was found to be positive for the flu influenza B on 3/05/2023.  She was given Levaquin for 7 days.  She reports that she is held her CellCept all this time. She has held MMF for the past day 8. She scheduled to have a right heart cath on 3/16/23. Hernia repair is on 3/23/23.  She missed her last lab appointment due to the flu.    12/19/22: Reports that she has been  "having a upper respiratory infection. She reports a lingering cough since thanksgiving and she was given an antiboitic. She does report improvement since this time but does still a productive cough at night. She endorses mild wheezing. She has been holding her cellcept for the past few weeks. She states that the wound on her feet are improving. She is scheduled for a hernia repair in January and asks for instructions of how to hold her MMF at that time. She denies fevers, diarrhea, SOB, joint pain.     Current Treatment Regimen:   She is currently on MMF 1000mg BID (liquid suspension) (currently held due to recent flu infection as of 03/13/2023)    Review of Systems   Constitutional:  Negative for fever and unexpected weight change.   HENT:  Positive for trouble swallowing. Negative for mouth sores.    Eyes:  Negative for redness.   Respiratory:  Negative for cough and shortness of breath.    Cardiovascular:  Negative for chest pain.   Gastrointestinal:  Negative for constipation and diarrhea.   Genitourinary:  Negative for dysuria and genital sores.   Musculoskeletal:  Negative for joint swelling.   Skin:  Positive for rash.   Neurological:  Negative for headaches.   Hematological:  Bruises/bleeds easily.       Objective:   /63   Pulse 71   Ht 5' 5" (1.651 m)   Wt 70.3 kg (155 lb)   BMI 25.79 kg/m²      Physical Exam   Constitutional: She is oriented to person, place, and time. No distress.   HENT:   Head: Normocephalic and atraumatic.   Right Ear: External ear normal.   Left Ear: External ear normal.   Nose: Nose normal.   Mouth/Throat: Oropharynx is clear and moist.   Eyes: Conjunctivae are normal. Right eye exhibits no discharge. Left eye exhibits no discharge. No scleral icterus.   Neck: No thyromegaly present.   Cardiovascular: Normal rate, regular rhythm and normal heart sounds.   No murmur heard.  Pulmonary/Chest: Effort normal and breath sounds normal. No respiratory distress. She has no wheezes. " She exhibits no tenderness.   Crackles b/l stable from prior exam   Abdominal: Soft. Bowel sounds are normal. She exhibits no distension. There is no abdominal tenderness.   Musculoskeletal:         General: Swelling and deformity present. No tenderness. Normal range of motion.      Comments: Skin tightness  Claw hand deformity, flexion contractures b/l  Skin score: 23  Chronic LE ulcers.  3+ pitting of the lower extremities.  Pain on palpation of the right groin area without overlying erythema and mild soft swelling noted.   Neurological: She is alert and oriented to person, place, and time.   Skin: Skin is warm and dry. No rash noted. She is not diaphoretic. No erythema.   Psychiatric: Mood and affect normal.   Vitals reviewed.                      Modified Skin Score:  Skin score head= 1  Chest= 1  abdomen=0         Right upper arm=1  Left upper arm=1  Right lower arm=0 Left lower arm: 1  Right hand=3 Left hand=1  R Fingers= 3        L Fingers=3  Right upper leg= 0  Left upper leg=0  Right lower leg= 1  Left lower leg= 1  Right foot= 3            Left foot= 3    Modified Skin Score: 23    Assessment:       1. Scleroderma    2. Pulmonary hypertension    3. ILD (interstitial lung disease)    4. Osteopenia, unspecified location    5. Skin ulcers of both feet    6. Anemia, unspecified type    7. Other long term (current) drug therapy     8. Venous insufficiency of both lower extremities    9. Right groin mass    10. Other specified soft tissue disorders    11. Recurrent infections            Yamel Shah is a 70 y.o. female with SSc (+SSA, +SCL-70, -RNAP3) with ILD, diastolic dysfunction, and GERD with Pagan's esophagus who presents for follow up for disease management. She was originally diagnosed in 1983 and has been seeing Dr. Ahuja for over 10 years.( +SSA, +SCL-70, -RNAP3, ILD). She has been on MMF since 2019. Used to follow with Dr. Leigh in pulmonology. She follows with Dr. Martins in  Cardiology and had a right heart cath 1/2021 which showed normal PA pressures. She takes tadalafil. Her last echo was in 12/2022 with indeteriminate diasystolic dysfunction and PA 42 mmhg, will have RHC on 3/16/23. Additionally, she has chronic ulcers in her feet. She is s/p laser venous ablation on right foot in 12/2020 by Dr. Claudio, recently seen in 12/2021 where ABIs were normal without signs of PVD. Will see vascular surgery in 4/2023.  Will get labs and PFTs today.  She is due to see pulmonology.  She was holding her MMF for a URI for the past 3 weeks but has finished antibiotics.  Given her upcoming hernia surgery, instructed to continue to hold CellCept until 1 week after surgery.  Will also get soft tissue ultrasound of the right groin given that she reports pain here to r/o hernia.  Will get immunoglobulins today and refer to Allergy for further workup for recurrent infections given that she has had 3 URIs within the last 3 months.     Plan:       Problem List Items Addressed This Visit          Pulmonary    Pulmonary hypertension    ILD (interstitial lung disease)    Relevant Orders    Spriometry w/Tracings    DLCO    Lung Volume    Six Minute Walk       Cardiac/Vascular    Venous insufficiency of both lower extremities       Orthopedic    Skin ulcers of both feet    Osteopenia     Other Visit Diagnoses       Scleroderma    -  Primary    Relevant Orders    Spriometry w/Tracings    DLCO    Lung Volume    Six Minute Walk    IMMUNOGLOBULINS (IGG, IGA, IGM) QUANTITATIVE    Anemia, unspecified type        Other long term (current) drug therapy         Right groin mass        Relevant Orders    US Soft Tissue, Groin Right    Other specified soft tissue disorders        Relevant Orders    US Soft Tissue, Groin Right    Recurrent infections        Relevant Orders    IMMUNOGLOBULINS (IGG, IGA, IGM) QUANTITATIVE    Ambulatory referral/consult to Allergy          - labs today  - currently holding MMF 1000mg BID  (liquid suspension)  due to flu, given her hernia repair in 2 weeks, instructed to continue to hold CellCept until 1 week after surgery   - Cardiac: pHTN secondary to SSc, last echo 12/2022 with hgmm 42, EF 67%, and indeterminate diastolic dysfunction; scheduled for RHC on 3/16/23  - Pulm: ILD, Last CT chest with stable from 2020. PFT from 1/2022 without DLCO, last appointment with Dr. Leigh in 11/2022, will need appointment scheduled; will send message to Dr. Leigh, 6-month PFTs scheduled today  1/2020:   FVC 74.3%,   FEV1/FVC  104.5%,   TLC 62.4%    DLCO  71.0%   9/2020:   FVC 72.3%    FEV1/FVC  99.8%      TLC 63.3%    DLCO  66.2%  5/2021:   FVC 67.8%    FEV1/FVC  101.3%    TLC 62.4%    DLCO  66.7%                  10/2022: FVC 67.6%,   FEV1/FVC  104.1%,   TLC 62.7%    DLCO  55.3%  - Vascular: Chronic bilateral Foot ulcers; seen last by vascular surgery Dr. Claudio in 12/2021 and noted to ulcers are likely due to chronic venous insufficiency; scheduled to follow-up with Dr. Claudio in 4/2023 to see if additional intervention is needed  - Soft tissue swelling:  Reports right groin soft tissue swelling for the past few months, will get soft tissue ultrasound today to r/o hernia and send message to surgery  - will get immunoglobulins today and refer to Allergy for recurrent infections for further workup as she has had the URIs within the last 3 months  - up to date on covid series and flu vaccine    Follow-up in 3 months with labs before visit      Patient seen and evaluated with Dr. Ahuja

## 2023-03-13 NOTE — PROGRESS NOTES
Rapid3 Question Responses and Scores 3/6/2023   MDHAQ Score 1   Psychologic Score 3.3   Pain Score 7   When you awakened in the morning OVER THE LAST WEEK, did you feel stiff? No   If Yes, please indicate the number of hours until you are as limber as you will be for the day 8   Fatigue Score 6   Global Health Score 5.5   RAPID3 Score 5.28     Answers submitted by the patient for this visit:  Rheumatology Questionnaire (Submitted on 3/6/2023)  fever: No  eye redness: No  mouth sores: No  headaches: No  shortness of breath: Yes  chest pain: No  trouble swallowing: Yes  diarrhea: No  constipation: No  unexpected weight change: No  genital sore: No  dysuria: No  During the last 3 days, have you had a skin rash?: No  Bruises or bleeds easily: No  cough: Yes

## 2023-03-16 ENCOUNTER — HOSPITAL ENCOUNTER (OUTPATIENT)
Facility: HOSPITAL | Age: 73
Discharge: HOME | End: 2023-03-16
Attending: INTERNAL MEDICINE | Admitting: INTERNAL MEDICINE
Payer: MEDICARE

## 2023-03-16 VITALS
RESPIRATION RATE: 16 BRPM | HEIGHT: 65 IN | BODY MASS INDEX: 25.83 KG/M2 | DIASTOLIC BLOOD PRESSURE: 64 MMHG | OXYGEN SATURATION: 99 % | HEART RATE: 81 BPM | TEMPERATURE: 98 F | SYSTOLIC BLOOD PRESSURE: 127 MMHG | WEIGHT: 155 LBS

## 2023-03-16 DIAGNOSIS — I27.20 PULMONARY HYPERTENSION: ICD-10-CM

## 2023-03-16 PROCEDURE — 93451 PR RIGHT HEART CATH O2 SATURATION & CARDIAC OUTPUT: ICD-10-PCS | Mod: 26,NTX,, | Performed by: INTERNAL MEDICINE

## 2023-03-16 PROCEDURE — 93451 RIGHT HEART CATH: CPT | Mod: NTX | Performed by: INTERNAL MEDICINE

## 2023-03-16 PROCEDURE — 93451 RIGHT HEART CATH: CPT | Mod: 26,NTX,, | Performed by: INTERNAL MEDICINE

## 2023-03-16 PROCEDURE — C1751 CATH, INF, PER/CENT/MIDLINE: HCPCS | Mod: TXP | Performed by: INTERNAL MEDICINE

## 2023-03-16 PROCEDURE — C1894 INTRO/SHEATH, NON-LASER: HCPCS | Mod: TXP | Performed by: INTERNAL MEDICINE

## 2023-03-16 NOTE — PROGRESS NOTES
Report received from ANTON Oropeza. Patient s/p RHC. Dressing to R IJ cdi, soft. Vss. Family at bedside.

## 2023-03-16 NOTE — Clinical Note
The PA catheter was repositioned to the main pulmonary artery. Hemodynamics were performed. O2 saturation was measured at 74%.

## 2023-03-16 NOTE — PROGRESS NOTES
Patient discharged per MD orders. Instructions given on medications, wound care, activity, signs of infection, when to call MD, and follow up appointments. Pt verbalized understanding.  Patient AAOx3, VSS, no c/o pain or discomfort at this time. Patient left unit via wheelchair with transport and family.

## 2023-03-16 NOTE — DISCHARGE SUMMARY
[facility] - Short Stay Cardiac Unit  Discharge Note  Short Stay    Procedure(s) (LRB):  INSERTION, CATHETER, RIGHT HEART (Right)      OUTCOME: Patient tolerated treatment/procedure well without complication and is now ready for discharge.    DISPOSITION: Home or Self Care    FINAL DIAGNOSIS:  <principal problem not specified>    FOLLOWUP: In clinic    DISCHARGE INSTRUCTIONS:  No discharge procedures on file.     TIME SPENT ON DISCHARGE: 20 minutes

## 2023-03-16 NOTE — DISCHARGE INSTRUCTIONS
AFTER THE PROCEDURE:   -DO NOT DRINK OR EAT ANYTHING UNTIL NO LONGER HOARSE   -You may remove the bandage in 24 hours and wash with soap and water.   -You may shower, but do not soak in a tub for three days.   PRECAUTIONS FOR THE NEXT 24 HOURS:   -If you need to cough, sneeze, have a bowel movement, or bear down, hold pressure over your bandage.   -Do not  anything heavier than a gallon of milk(about 5 pounds)   -Avoid excessive bending over.   SYMPTOMS TO WATCH FOR AND REPORT TO YOUR DOCTOR:   -BLEEDING: hold pressure over the site until bleeding stops. Proceed to Emergency Room by ambulance (do not drive yourself) if unable to stop bleeding. Notify your doctor.   -HEMATOMA(hard bruise under the skin): Talib around the bruise if one develops. Call your doctor if it increases in size or if you have difficulty talking, swallowing, breathing or anything unusual.   SIGNS OF INFECTION:Fever (temperature over 100.5 F), pus or redness   -RASH   -CHEST PAIN OR SHORTNESS OF BREATH   You may call you coordinator in the Heart Failure/Heart Transplant/Pulmonary Hypertension Clinic at [phone] during normal business hours(Monday through Friday from 8 A.M. to 5 P.M.) After hours, call the Heart Transplant Service doctor on call at [phone].

## 2023-03-22 ENCOUNTER — HOSPITAL ENCOUNTER (OUTPATIENT)
Dept: VASCULAR SURGERY | Facility: CLINIC | Age: 73
Discharge: HOME | DRG: 327 | End: 2023-03-22
Attending: SURGERY
Payer: MEDICARE

## 2023-03-22 ENCOUNTER — TELEPHONE (OUTPATIENT)
Dept: SURGERY | Facility: CLINIC | Age: 73
End: 2023-03-22
Payer: MEDICARE

## 2023-03-22 ENCOUNTER — OFFICE VISIT (OUTPATIENT)
Dept: SURGERY | Facility: CLINIC | Age: 73
DRG: 327 | End: 2023-03-22
Payer: MEDICARE

## 2023-03-22 VITALS
BODY MASS INDEX: 26.14 KG/M2 | HEART RATE: 69 BPM | DIASTOLIC BLOOD PRESSURE: 65 MMHG | WEIGHT: 156.88 LBS | HEIGHT: 65 IN | SYSTOLIC BLOOD PRESSURE: 139 MMHG

## 2023-03-22 DIAGNOSIS — G60.9 IDIOPATHIC NEUROPATHY: ICD-10-CM

## 2023-03-22 DIAGNOSIS — M85.89 OSTEOPENIA OF MULTIPLE SITES: ICD-10-CM

## 2023-03-22 DIAGNOSIS — I10 ESSENTIAL HYPERTENSION: ICD-10-CM

## 2023-03-22 DIAGNOSIS — E55.9 VITAMIN D DEFICIENCY: ICD-10-CM

## 2023-03-22 DIAGNOSIS — D50.9 IRON DEFICIENCY ANEMIA, UNSPECIFIED IRON DEFICIENCY ANEMIA TYPE: ICD-10-CM

## 2023-03-22 DIAGNOSIS — K44.9 HIATAL HERNIA WITH GERD AND ESOPHAGITIS: Primary | ICD-10-CM

## 2023-03-22 DIAGNOSIS — I27.29 PULMONARY HYPERTENSION ASSOCIATED WITH SYSTEMIC DISORDER: ICD-10-CM

## 2023-03-22 DIAGNOSIS — I87.2 VENOUS INSUFFICIENCY OF BOTH LOWER EXTREMITIES: ICD-10-CM

## 2023-03-22 DIAGNOSIS — J84.9 ILD (INTERSTITIAL LUNG DISEASE): ICD-10-CM

## 2023-03-22 DIAGNOSIS — I73.00 RAYNAUD'S DISEASE WITHOUT GANGRENE: ICD-10-CM

## 2023-03-22 DIAGNOSIS — M47.812 CERVICAL SPONDYLOSIS: ICD-10-CM

## 2023-03-22 DIAGNOSIS — M34.1 SCLERODERMA OF ESOPHAGUS: ICD-10-CM

## 2023-03-22 DIAGNOSIS — I73.9 PVD (PERIPHERAL VASCULAR DISEASE): ICD-10-CM

## 2023-03-22 DIAGNOSIS — G89.4 CHRONIC PAIN DISORDER: ICD-10-CM

## 2023-03-22 DIAGNOSIS — I27.20 PULMONARY HYPERTENSION: ICD-10-CM

## 2023-03-22 DIAGNOSIS — M34.9 SCLERODERMA WITH PULMONARY INVOLVEMENT: ICD-10-CM

## 2023-03-22 DIAGNOSIS — K21.00 HIATAL HERNIA WITH GERD AND ESOPHAGITIS: Primary | ICD-10-CM

## 2023-03-22 PROCEDURE — 99999 PR PBB SHADOW E&M-EST. PATIENT-LVL III: CPT | Mod: PBBFAC,TXP,, | Performed by: SURGERY

## 2023-03-22 PROCEDURE — 99213 OFFICE O/P EST LOW 20 MIN: CPT | Mod: PBBFAC,TXP | Performed by: SURGERY

## 2023-03-22 PROCEDURE — 93970 EXTREMITY STUDY: CPT | Mod: 26,S$PBB,NTX, | Performed by: SURGERY

## 2023-03-22 PROCEDURE — 99215 OFFICE O/P EST HI 40 MIN: CPT | Mod: 57,S$PBB,NTX, | Performed by: SURGERY

## 2023-03-22 PROCEDURE — 99215 PR OFFICE/OUTPT VISIT, EST, LEVL V, 40-54 MIN: ICD-10-PCS | Mod: 57,S$PBB,NTX, | Performed by: SURGERY

## 2023-03-22 PROCEDURE — 93970 EXTREMITY STUDY: CPT | Mod: PBBFAC,NTX | Performed by: SURGERY

## 2023-03-22 PROCEDURE — 99999 PR PBB SHADOW E&M-EST. PATIENT-LVL III: ICD-10-PCS | Mod: PBBFAC,TXP,, | Performed by: SURGERY

## 2023-03-22 PROCEDURE — 93970 PR US DUPLEX, UPPER OR LOWER EXT VENOUS,COMPLETE BILAT: ICD-10-PCS | Mod: 26,S$PBB,NTX, | Performed by: SURGERY

## 2023-03-22 RX ORDER — ACETAMINOPHEN AND CODEINE PHOSPHATE 300; 30 MG/1; MG/1
TABLET ORAL NIGHTLY
COMMUNITY
End: 2023-03-27

## 2023-03-22 NOTE — TELEPHONE ENCOUNTER
I called and left a phone message.  Explained that Dr. Segura is asking to speak to her and her  about her surgery scheduled 3-23.  Explained that Dr. Segura has some time this morning and around 1 pm for a Virtual or an In person appointment - whatever is convenient for her.  I left the Office phone number for a return phone call.

## 2023-03-22 NOTE — PROGRESS NOTES
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 She has had symptoms of heartburn, dysphagia, and nocturnal regurgitation for the last 30 years, which have progressively worsened over the last two despite taking Aciphex. GES 5/4/20 demonstrated 25% retention at 4 hours. GP symptoms are well controlled with dietary modifications. Denies nausea, vomiting, gas bloat, or abdominal pain. Rare dysphagia. Rare early satiety. Weight is stable.     Work-up includes:  HREM 3/4/20: Low LES pressure with complete relaxation, absent contractility, hiatal hernia  pH impedence 3/6/30: [place] 133  GES 5/4/20: Retention of 25% at 4 hours  EGD 7/1/22: LA grade A esophagitis, nondysplastic Pagan's, benign-appearing stenosis, 2-cm hiatal hernia.     Interval History 3/22/23:  Patient is a 72 year-old woman who returns for pre-op discussion. Her symptoms and overall health are unchanged. Her case was presented at [facility] Multidisciplinary Swallow Conference 11/8/22 at which time concomitant pyloromyotomy and hiatal hernia repair without fundoplication was recommended.    Chief Complaint   Patient presents with    Follow-up    Hiatal Hernia    Gastroesophageal Reflux    Dysphagia     Review of patient's allergies indicates:   Allergen Reactions    Sulfa (sulfonamide antibiotics)      Other reaction(s): Rash    Tramadol Itching     Current Outpatient Medications   Medication Sig Dispense Refill    albuterol (VENTOLIN HFA) 90 mcg/actuation inhaler Inhale 2 puffs into the lungs every 4 (four) hours as needed for Wheezing. Rescue 18 g 3    carboxymethylcellulose sodium (REFRESH TEARS) 0.5 % Drop Apply 1-2 drops to every as needed      ergocalciferol (ERGOCALCIFEROL)  50,000 unit Cap Take 1 capsule (50,000 Units total) by mouth every 7 days. 12 capsule 1    ferrous sulfate 325 (65 FE) MG EC tablet Take 325 mg by mouth 2 (two) times daily.      flu vac 2022 [id number], (FLUAD QUAD 2022-23,65Y UP,,PF,) 60 mcg (15 mcg x 4)/0.5 mL Syrg Inject into the muscle. 0.5 mL 0    multivitamin capsule Take 1 capsule by mouth once daily.      mycophenolate mofetil (CELLCEPT) 200 mg/mL SusR Take 5 mLs (1,000 mg total) by mouth 2 (two) times daily. 480 mL 1    nabumetone (RELAFEN) 750 MG tablet Take 1 tablet (750 mg total) by mouth 2 (two) times daily as needed for Pain. 60 tablet 3    NIFEdipine (ADALAT CC) 90 MG TbSR TAKE 1 TABLET(90 MG) BY MOUTH EVERY DAY 90 tablet 3    NIFEdipine (PROCARDIA-XL) 30 MG (OSM) 24 hr tablet TAKE 1 TABLET(30 MG) BY MOUTH EVERY DAY 30 tablet 11    pravastatin (PRAVACHOL) 20 MG tablet TAKE 1 TABLET(20 MG) BY MOUTH EVERY EVENING 90 tablet 2    pregabalin (LYRICA) 300 MG Cap Take 1 capsule (300 mg total) by mouth 2 (two) times daily. 60 capsule 6    PREVIDENT 5000 BOOSTER PLUS 1.1 % Pste SMARTSIG:To Teeth      PREVIDENT 5000 SENSITIVE 1.1-5 % Pste BRUSH FOR 2 MINUTES TWICE PER DAY      sars-cov-2, covid-19, (MODERNA COVID-19) 50 mcg/0.25 ml injection (BOOSTER) Inject into the muscle. 0.25 mL 0    tadalafil (ADCIRCA) 20 mg Tab Take 2 tablets (40 mg total) by mouth once daily. 28 tablet 11    tiZANidine (ZANAFLEX) 4 MG tablet Take 1 tablet (4 mg total) by mouth nightly as needed (muscle pain). 30 tablet 3    RABEprazole (ACIPHEX) 20 mg tablet TAKE 1 TABLET BY MOUTH TWICE DAILY 60 tablet 11     No current facility-administered medications for this visit.     Past Medical History:   Diagnosis Date    Abnormal Pap smear     Acid reflux     Allergy     Arthritis     Encounter for blood transfusion     GERD (gastroesophageal reflux disease)     History of migraine headaches     Hx of colonic polyp     Hypertension     Idiopathic neuropathy 7/20/2012    ILD (interstitial  lung disease) 11/6/2013    Iron deficiency anemia 3/18/2014    MRSA carrier     Osteopenia     Pneumonia     Pulmonary fibrosis     Pulmonary hypertension     Raynaud's disease     Scleroderma, diffuse     Sjogren's syndrome     Vitamin D deficiency 11/14/2013     Past Surgical History:   Procedure Laterality Date    24 HOUR IMPEDANCE PH MONITORING OF ESOPHAGUS IN PATIENT NOT TAKING ACID REDUCING MEDICATIONS N/A 3/4/2020    Procedure: IMPEDANCE PH STUDY, ESOPHAGEAL, 24 HOUR, IN PATIENT NOT TAKING ACID REDUCING MEDICATION;  Surgeon: Annamaria Mendoza MD;  Location: [facility] (4TH FLR);  Service: Endoscopy;  Laterality: N/A;  OFF PPI/H2 Blocker   Motility Studies   Hold Narcotics x 1 days   Hold TCA x 1 days  2/26 - LVM attempting to confirm appt  2/27 - Confirmed appt    ANORECTAL MANOMETRY N/A 5/10/2021    Procedure: MANOMETRY, ANORECTAL with balloon expulsion test;  Surgeon: Annamaria Mendoza MD;  Location: [facility] (4TH FLR);  Service: Endoscopy;  Laterality: N/A;  order combined  covid test 5/7 [employer], instructions emailed-\[facility] [place] [facility]\""    BREAST BIOPSY      Left, benign    CERVICAL CONIZATION   W/ LASER  1970    COLONOSCOPY      COLONOSCOPY N/A 3/29/2019    Procedure: COLONOSCOPY;  Surgeon: Annamaria Mendoza MD;  Location: [facility] (2ND FLR);  Service: Endoscopy;  Laterality: N/A;    COLONOSCOPY N/A 5/10/2021    Procedure: COLONOSCOPY;  Surgeon: Annamaria Mendoza MD;  Location: [facility] (4TH FLR);  Service: Endoscopy;  Laterality: N/A;  2nd floor-previous scopes done on 2nd floor, gastroparesis  full liquid diet x2 days, clear liquid x1 day prior to procedure  covid test 5/7 [employer], instructions emailed-[facility]  5/6 pt confirmed appt-[facility]    DILATION AND CURETTAGE OF UTERUS      ESOPHAGEAL MANOMETRY WITH MEASUREMENT OF IMPEDANCE N/A 3/4/2020    Procedure: MANOMETRY, ESOPHAGUS, WITH IMPEDANCE MEASUREMENT;  Surgeon: Annamaria Mendoza MD;  Location: [facility] (4TH FLR);  Service: Endoscopy;  Laterality: N/A;  OFF PPI/H2  Blocker   Motility Studies   Hold Narcotics x 1 days   Hold TCA x 1 days    ESOPHAGOGASTRODUODENOSCOPY      ESOPHAGOGASTRODUODENOSCOPY N/A 3/29/2019    Procedure: EGD (ESOPHAGOGASTRODUODENOSCOPY);  Surgeon: Annamaria Mendoza MD;  Location: [facility] (2ND FLR);  Service: Endoscopy;  Laterality: N/A;  pulmonary htn    ESOPHAGOGASTRODUODENOSCOPY N/A 2/2/2021    Procedure: ESOPHAGOGASTRODUODENOSCOPY (EGD);  Surgeon: Annamaria Mendoza MD;  Location: [facility] (2ND FLR);  Service: Endoscopy;  Laterality: N/A;  2nd floor gastroparesis  3 days full liquid diet and 1 day clears  covid test 1/30 primary care, instructions sent to [facility]    ESOPHAGOGASTRODUODENOSCOPY N/A 7/1/2022    Procedure: ESOPHAGOGASTRODUODENOSCOPY (EGD);  Surgeon: Annamaria Mendoza MD;  Location: [facility] (2ND FLR);  Service: Endoscopy;  Laterality: N/A;  2nd floor-gastroparesis  full liquid diet x3 days, clear liquid diet x1 day prior to procedure  fully vaccinated, instructions sent to [facility]  6/29-pt confirmed new arrival time-[facility]    HYSTERECTOMY  1990    FRANDY (AUB, Fibroids), ovaries remain    RIGHT HEART CATHETERIZATION Right 1/5/2021    Procedure: INSERTION, CATHETER, RIGHT HEART;  Surgeon: Aureliano Sy MD;  Location: [facility] CATH LAB;  Service: Cardiology;  Laterality: Right;    RIGHT HEART CATHETERIZATION Right 3/16/2023    Procedure: INSERTION, CATHETER, RIGHT HEART;  Surgeon: Aureliano Sy MD;  Location: [facility] CATH LAB;  Service: Cardiology;  Laterality: Right;    VARICOSE VEIN SURGERY       Family History   Problem Relation Age of Onset    Breast cancer Mother     Hypertension Father     Breast cancer Sister     Diabetes Sister     Osteoarthritis Brother     Diabetes Brother     No Known Problems Daughter     No Known Problems Daughter     No Known Problems Son     No Known Problems Son     Breast cancer Maternal Aunt     Melanoma Neg Hx     Colon cancer Neg Hx     Crohn's disease Neg Hx     Stomach cancer Neg Hx   "   Ulcerative colitis Neg Hx     Rectal cancer Neg Hx     Irritable bowel syndrome Neg Hx     Esophageal cancer Neg Hx     Celiac disease Neg Hx     Ovarian cancer Neg Hx     Liver cancer Neg Hx     Pancreatic cancer Neg Hx      Social History     Tobacco Use    Smoking status: Never    Smokeless tobacco: Never   Substance Use Topics    Alcohol use: Yes     Comment: wine occasionally    Drug use: No      Review of Systems:  Review of Systems   Constitutional:  Positive for appetite change (early satiety). Negative for unexpected weight change.   HENT:  Positive for trouble swallowing.    Eyes: Negative.    Respiratory:  Positive for shortness of breath.    Cardiovascular:  Negative for chest pain.   Gastrointestinal:  Negative for abdominal pain.   Endocrine: Negative.    Genitourinary: Negative.    Musculoskeletal:  Positive for arthralgias and joint swelling.   Skin:  Positive for color change (BLE 2/2 chronic venous insufficiency).   Allergic/Immunologic: Positive for immunocompromised state.   Neurological: Negative.    Hematological: Negative.    Psychiatric/Behavioral: Negative.       OBJECTIVE:     Vital Signs (Most Recent)  Pulse: 69 (03/22/23 1119)  BP: 139/65 (03/22/23 1119)  5' 5" (1.651 m)  71.2 kg (156 lb 13.7 oz)     Physical Exam:  Physical Exam  Vitals reviewed.   Constitutional:       General: She is not in acute distress.     Appearance: Normal appearance. She is well-developed. She is not ill-appearing.   HENT:      Head: Normocephalic and atraumatic.   Eyes:      General: No scleral icterus.     Conjunctiva/sclera: Conjunctivae normal.   Cardiovascular:      Rate and Rhythm: Normal rate and regular rhythm.   Pulmonary:      Effort: Pulmonary effort is normal. No respiratory distress.   Abdominal:      General: There is no distension.      Palpations: Abdomen is soft.      Tenderness: There is no abdominal tenderness. There is no guarding.   Musculoskeletal:         General: Deformity present.     "  Cervical back: Normal range of motion and neck supple.   Skin:     General: Skin is warm and dry.   Neurological:      General: No focal deficit present.      Mental Status: She is alert and oriented to person, place, and time.   Psychiatric:         Mood and Affect: Mood normal.         Behavior: Behavior normal.     Laboratory  CBC: Reviewed  CMP: Reviewed    Diagnostic Results:  Upper GI: Reviewed  EGD, Bravo pH, HREM: Reviewed    ASSESSMENT/PLAN:   Patient is a 72 year-old woman with scleroderma esophagus, hiatal hernia, GERD, and gastroparesis. We discussed the anatomy and natural course of hiatal hernias, GERD, and gastroparesis, and the indications for surgical intervention. We discussed the risks, benefits and alternatives to hiatal hernia repair, the risks including but not limited to bleeding, infection, injury to bowel or spleen, esophageal or gastric perforation, vagal nerve injury which can result in dumping syndrome or delayed gastric emptying, pneumothorax, dysphagia, and recurrence of hiatal hernia and GERD. We discussed the rationale for not performing a fundoplication given her scleroderma esophagus and the increased risk of persistent GERD given her gastroparesis. We discussed the rationale for performing concomitant pyloromyotomy vs pyloroplasty to improve gastric emptying which carries additional risk of possible duodenal injury or perforation. All questions were answered to her and her 's satisfaction and she has elected to proceed with robotic hiatal hernia repair and pyloromyotomy (poss pyloroplasty). Informed consent was obtained. She is scheduled for tomorrow 3/23/23.    Katherine Segura  3/22/2023

## 2023-03-22 NOTE — H&P (VIEW-ONLY)
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 She has had symptoms of heartburn, dysphagia, and nocturnal regurgitation for the last 30 years, which have progressively worsened over the last two despite taking Aciphex. GES 5/4/20 demonstrated 25% retention at 4 hours. GP symptoms are well controlled with dietary modifications. Denies nausea, vomiting, gas bloat, or abdominal pain. Rare dysphagia. Rare early satiety. Weight is stable.     Work-up includes:  HREM 3/4/20: Low LES pressure with complete relaxation, absent contractility, hiatal hernia  pH impedence 3/6/30: [place] 133  GES 5/4/20: Retention of 25% at 4 hours  EGD 7/1/22: LA grade A esophagitis, nondysplastic Pagan's, benign-appearing stenosis, 2-cm hiatal hernia.     Interval History 3/22/23:  Patient is a 72 year-old woman who returns for pre-op discussion. Her symptoms and overall health are unchanged. Her case was presented at [facility] [place] [place] Multidisciplinary Swallow Conference 11/8/22 at which time concomitant pyloromyotomy and hiatal hernia repair without fundoplication was recommended.    Chief Complaint   Patient presents with    Follow-up    Hiatal Hernia    Gastroesophageal Reflux    Dysphagia     Review of patient's allergies indicates:   Allergen Reactions    Sulfa (sulfonamide antibiotics)      Other reaction(s): Rash    Tramadol Itching     Current Outpatient Medications   Medication Sig Dispense Refill    albuterol (VENTOLIN HFA) 90 mcg/actuation inhaler Inhale 2 puffs into the lungs every 4 (four) hours as needed for Wheezing. Rescue 18 g 3    carboxymethylcellulose sodium (REFRESH TEARS) 0.5 % Drop Apply 1-2 drops to every as needed      ergocalciferol (ERGOCALCIFEROL)  50,000 unit Cap Take 1 capsule (50,000 Units total) by mouth every 7 days. 12 capsule 1    ferrous sulfate 325 (65 FE) MG EC tablet Take 325 mg by mouth 2 (two) times daily.      flu vac 2022 [id number], (FLUAD QUAD 2022-23,65Y UP,,PF,) 60 mcg (15 mcg x 4)/0.5 mL Syrg Inject into the muscle. 0.5 mL 0    multivitamin capsule Take 1 capsule by mouth once daily.      mycophenolate mofetil (CELLCEPT) 200 mg/mL SusR Take 5 mLs (1,000 mg total) by mouth 2 (two) times daily. 480 mL 1    nabumetone (RELAFEN) 750 MG tablet Take 1 tablet (750 mg total) by mouth 2 (two) times daily as needed for Pain. 60 tablet 3    NIFEdipine (ADALAT CC) 90 MG TbSR TAKE 1 TABLET(90 MG) BY MOUTH EVERY DAY 90 tablet 3    NIFEdipine (PROCARDIA-XL) 30 MG (OSM) 24 hr tablet TAKE 1 TABLET(30 MG) BY MOUTH EVERY DAY 30 tablet 11    pravastatin (PRAVACHOL) 20 MG tablet TAKE 1 TABLET(20 MG) BY MOUTH EVERY EVENING 90 tablet 2    pregabalin (LYRICA) 300 MG Cap Take 1 capsule (300 mg total) by mouth 2 (two) times daily. 60 capsule 6    PREVIDENT 5000 BOOSTER PLUS 1.1 % Pste SMARTSIG:To Teeth      PREVIDENT 5000 SENSITIVE 1.1-5 % Pste BRUSH FOR 2 MINUTES TWICE PER DAY      sars-cov-2, covid-19, (MODERNA COVID-19) 50 mcg/0.25 ml injection (BOOSTER) Inject into the muscle. 0.25 mL 0    tadalafil (ADCIRCA) 20 mg Tab Take 2 tablets (40 mg total) by mouth once daily. 28 tablet 11    tiZANidine (ZANAFLEX) 4 MG tablet Take 1 tablet (4 mg total) by mouth nightly as needed (muscle pain). 30 tablet 3    RABEprazole (ACIPHEX) 20 mg tablet TAKE 1 TABLET BY MOUTH TWICE DAILY 60 tablet 11     No current facility-administered medications for this visit.     Past Medical History:   Diagnosis Date    Abnormal Pap smear     Acid reflux     Allergy     Arthritis     Encounter for blood transfusion     GERD (gastroesophageal reflux disease)     History of migraine headaches     Hx of colonic polyp     Hypertension     Idiopathic neuropathy 7/20/2012    ILD (interstitial  lung disease) 11/6/2013    Iron deficiency anemia 3/18/2014    MRSA carrier     Osteopenia     Pneumonia     Pulmonary fibrosis     Pulmonary hypertension     Raynaud's disease     Scleroderma, diffuse     Sjogren's syndrome     Vitamin D deficiency 11/14/2013     Past Surgical History:   Procedure Laterality Date    24 HOUR IMPEDANCE PH MONITORING OF ESOPHAGUS IN PATIENT NOT TAKING ACID REDUCING MEDICATIONS N/A 3/4/2020    Procedure: IMPEDANCE PH STUDY, ESOPHAGEAL, 24 HOUR, IN PATIENT NOT TAKING ACID REDUCING MEDICATION;  Surgeon: Annamaria Mendoza MD;  Location: [facility] (4TH FLR);  Service: Endoscopy;  Laterality: N/A;  OFF PPI/H2 Blocker   Motility Studies   Hold Narcotics x 1 days   Hold TCA x 1 days  2/26 - LVM attempting to confirm appt  2/27 - Confirmed appt    ANORECTAL MANOMETRY N/A 5/10/2021    Procedure: MANOMETRY, ANORECTAL with balloon expulsion test;  Surgeon: Annamaria Mendoza MD;  Location: [facility] (4TH FLR);  Service: Endoscopy;  Laterality: N/A;  order combined  covid test 5/7 [facility], instructions emailed-[facility]    BREAST BIOPSY      Left, benign    CERVICAL CONIZATION   W/ LASER  1970    COLONOSCOPY      COLONOSCOPY N/A 3/29/2019    Procedure: COLONOSCOPY;  Surgeon: Annamaria Mendoza MD;  Location: [facility] (2ND FLR);  Service: Endoscopy;  Laterality: N/A;    COLONOSCOPY N/A 5/10/2021    Procedure: COLONOSCOPY;  Surgeon: Annamaria Mendoza MD;  Location: [facility] (4TH FLR);  Service: Endoscopy;  Laterality: N/A;  2nd floor-previous scopes done on 2nd floor, gastroparesis  full liquid diet x2 days, clear liquid x1 day prior to procedure  covid test 5/7 [facility], instructions emailed-[facility]  5/6 pt confirmed appt-[facility]    DILATION AND CURETTAGE OF UTERUS      ESOPHAGEAL MANOMETRY WITH MEASUREMENT OF IMPEDANCE N/A 3/4/2020    Procedure: MANOMETRY, ESOPHAGUS, WITH IMPEDANCE MEASUREMENT;  Surgeon: Annamaria Mendoza MD;  Location: [facility] (4TH FLR);  Service: Endoscopy;  Laterality: N/A;  OFF PPI/H2  Blocker   Motility Studies   Hold Narcotics x 1 days   Hold TCA x 1 days    ESOPHAGOGASTRODUODENOSCOPY      ESOPHAGOGASTRODUODENOSCOPY N/A 3/29/2019    Procedure: EGD (ESOPHAGOGASTRODUODENOSCOPY);  Surgeon: Annamaria Mendoza MD;  Location: [facility] (2ND FLR);  Service: Endoscopy;  Laterality: N/A;  pulmonary htn    ESOPHAGOGASTRODUODENOSCOPY N/A 2/2/2021    Procedure: ESOPHAGOGASTRODUODENOSCOPY (EGD);  Surgeon: Annamaria Mendoza MD;  Location: [facility] (2ND FLR);  Service: Endoscopy;  Laterality: N/A;  2nd floor gastroparesis  3 days full liquid diet and 1 day clears  covid test 1/30 primary care, instructions sent to [facility]    ESOPHAGOGASTRODUODENOSCOPY N/A 7/1/2022    Procedure: ESOPHAGOGASTRODUODENOSCOPY (EGD);  Surgeon: Annamaria Mendoza MD;  Location: [facility] (2ND FLR);  Service: Endoscopy;  Laterality: N/A;  2nd floor-gastroparesis  full liquid diet x3 days, clear liquid diet x1 day prior to procedure  fully vaccinated, instructions sent to [facility]  6/29-pt confirmed new arrival time-[facility]    HYSTERECTOMY  1990    FRANDY (AUB, Fibroids), ovaries remain    RIGHT HEART CATHETERIZATION Right 1/5/2021    Procedure: INSERTION, CATHETER, RIGHT HEART;  Surgeon: Aureliano Sy MD;  Location: [facility] CATH LAB;  Service: Cardiology;  Laterality: Right;    RIGHT HEART CATHETERIZATION Right 3/16/2023    Procedure: INSERTION, CATHETER, RIGHT HEART;  Surgeon: Aureliano Sy MD;  Location: [facility] CATH LAB;  Service: Cardiology;  Laterality: Right;    VARICOSE VEIN SURGERY       Family History   Problem Relation Age of Onset    Breast cancer Mother     Hypertension Father     Breast cancer Sister     Diabetes Sister     Osteoarthritis Brother     Diabetes Brother     No Known Problems Daughter     No Known Problems Daughter     No Known Problems Son     No Known Problems Son     Breast cancer Maternal Aunt     Melanoma Neg Hx     Colon cancer Neg Hx     Crohn's disease Neg Hx     Stomach cancer Neg Hx   "   Ulcerative colitis Neg Hx     Rectal cancer Neg Hx     Irritable bowel syndrome Neg Hx     Esophageal cancer Neg Hx     Celiac disease Neg Hx     Ovarian cancer Neg Hx     Liver cancer Neg Hx     Pancreatic cancer Neg Hx      Social History     Tobacco Use    Smoking status: Never    Smokeless tobacco: Never   Substance Use Topics    Alcohol use: Yes     Comment: wine occasionally    Drug use: No      Review of Systems:  Review of Systems   Constitutional:  Positive for appetite change (early satiety). Negative for unexpected weight change.   HENT:  Positive for trouble swallowing.    Eyes: Negative.    Respiratory:  Positive for shortness of breath.    Cardiovascular:  Negative for chest pain.   Gastrointestinal:  Negative for abdominal pain.   Endocrine: Negative.    Genitourinary: Negative.    Musculoskeletal:  Positive for arthralgias and joint swelling.   Skin:  Positive for color change (BLE 2/2 chronic venous insufficiency).   Allergic/Immunologic: Positive for immunocompromised state.   Neurological: Negative.    Hematological: Negative.    Psychiatric/Behavioral: Negative.       OBJECTIVE:     Vital Signs (Most Recent)  Pulse: 69 (03/22/23 1119)  BP: 139/65 (03/22/23 1119)  5' 5" (1.651 m)  71.2 kg (156 lb 13.7 oz)     Physical Exam:  Physical Exam  Vitals reviewed.   Constitutional:       General: She is not in acute distress.     Appearance: Normal appearance. She is well-developed. She is not ill-appearing.   HENT:      Head: Normocephalic and atraumatic.   Eyes:      General: No scleral icterus.     Conjunctiva/sclera: Conjunctivae normal.   Cardiovascular:      Rate and Rhythm: Normal rate and regular rhythm.   Pulmonary:      Effort: Pulmonary effort is normal. No respiratory distress.   Abdominal:      General: There is no distension.      Palpations: Abdomen is soft.      Tenderness: There is no abdominal tenderness. There is no guarding.   Musculoskeletal:         General: Deformity present.     "  Cervical back: Normal range of motion and neck supple.   Skin:     General: Skin is warm and dry.   Neurological:      General: No focal deficit present.      Mental Status: She is alert and oriented to person, place, and time.   Psychiatric:         Mood and Affect: Mood normal.         Behavior: Behavior normal.     Laboratory  CBC: Reviewed  CMP: Reviewed    Diagnostic Results:  Upper GI: Reviewed  EGD, Bravo pH, HREM: Reviewed    ASSESSMENT/PLAN:   Patient is a 72 year-old woman with scleroderma esophagus, hiatal hernia, GERD, and gastroparesis. We discussed the anatomy and natural course of hiatal hernias, GERD, and gastroparesis, and the indications for surgical intervention. We discussed the risks, benefits and alternatives to hiatal hernia repair, the risks including but not limited to bleeding, infection, injury to bowel or spleen, esophageal or gastric perforation, vagal nerve injury which can result in dumping syndrome or delayed gastric emptying, pneumothorax, dysphagia, and recurrence of hiatal hernia and GERD. We discussed the rationale for not performing a fundoplication given her scleroderma esophagus and the increased risk of persistent GERD given her gastroparesis. We discussed the rationale for performing concomitant pyloromyotomy vs pyloroplasty to improve gastric emptying which carries additional risk of possible duodenal injury or perforation. All questions were answered to her and her 's satisfaction and she has elected to proceed with robotic hiatal hernia repair and pyloromyotomy (poss pyloroplasty). Informed consent was obtained. She is scheduled for tomorrow 3/23/23.    Katherine Segura  3/22/2023

## 2023-03-23 ENCOUNTER — ANESTHESIA (OUTPATIENT)
Dept: SURGERY | Facility: HOSPITAL | Age: 73
DRG: 327 | End: 2023-03-23
Payer: MEDICARE

## 2023-03-23 ENCOUNTER — HOSPITAL ENCOUNTER (INPATIENT)
Facility: HOSPITAL | Age: 73
LOS: 1 days | Discharge: HOME | DRG: 327 | End: 2023-03-24
Attending: SURGERY | Admitting: SURGERY
Payer: MEDICARE

## 2023-03-23 ENCOUNTER — ANESTHESIA EVENT (OUTPATIENT)
Dept: SURGERY | Facility: HOSPITAL | Age: 73
DRG: 327 | End: 2023-03-23
Payer: MEDICARE

## 2023-03-23 DIAGNOSIS — K44.9 HIATAL HERNIA: Primary | ICD-10-CM

## 2023-03-23 LAB
ABO + RH BLD: ABNORMAL
BLD GP AB SCN CELLS X3 SERPL QL: ABNORMAL
BLOOD GROUP ANTIBODIES SERPL: NORMAL
DAT IGG-SP REAG RBC-IMP: NORMAL

## 2023-03-23 PROCEDURE — 99900035 HC TECH TIME PER 15 MIN (STAT): Mod: NTX

## 2023-03-23 PROCEDURE — 25000003 PHARM REV CODE 250: Mod: NTX | Performed by: STUDENT IN AN ORGANIZED HEALTH CARE EDUCATION/TRAINING PROGRAM

## 2023-03-23 PROCEDURE — 63600175 PHARM REV CODE 636 W HCPCS: Mod: NTX | Performed by: NURSE ANESTHETIST, CERTIFIED REGISTERED

## 2023-03-23 PROCEDURE — 37000008 HC ANESTHESIA 1ST 15 MINUTES: Mod: NTX | Performed by: SURGERY

## 2023-03-23 PROCEDURE — 86870 RBC ANTIBODY IDENTIFICATION: CPT | Mod: NTX | Performed by: SURGERY

## 2023-03-23 PROCEDURE — 63600175 PHARM REV CODE 636 W HCPCS: Mod: NTX | Performed by: ANESTHESIOLOGY

## 2023-03-23 PROCEDURE — 71000015 HC POSTOP RECOV 1ST HR: Mod: NTX | Performed by: SURGERY

## 2023-03-23 PROCEDURE — D9220A PRA ANESTHESIA: ICD-10-PCS | Mod: ANES,NTX,, | Performed by: ANESTHESIOLOGY

## 2023-03-23 PROCEDURE — D9220A PRA ANESTHESIA: Mod: ANES,NTX,, | Performed by: ANESTHESIOLOGY

## 2023-03-23 PROCEDURE — 37000009 HC ANESTHESIA EA ADD 15 MINS: Mod: NTX | Performed by: SURGERY

## 2023-03-23 PROCEDURE — 11000001 HC ACUTE MED/SURG PRIVATE ROOM: Mod: NTX

## 2023-03-23 PROCEDURE — 43659 PR LAPAROSCOPIC; LOCAL; ULCER OR TUMOR OF STOMACH: ICD-10-PCS | Mod: NTX,,, | Performed by: SURGERY

## 2023-03-23 PROCEDURE — 25000003 PHARM REV CODE 250: Mod: NTX | Performed by: NURSE ANESTHETIST, CERTIFIED REGISTERED

## 2023-03-23 PROCEDURE — 25000003 PHARM REV CODE 250: Mod: NTX | Performed by: SURGERY

## 2023-03-23 PROCEDURE — 88342 CHG IMMUNOCYTOCHEMISTRY: ICD-10-PCS | Mod: 26,NTX,, | Performed by: PATHOLOGY

## 2023-03-23 PROCEDURE — 43280 PR LAP,ESOPHAGOGAST FUNDOPLASTY: ICD-10-PCS | Mod: NTX,,, | Performed by: SURGERY

## 2023-03-23 PROCEDURE — 86900 BLOOD TYPING SEROLOGIC ABO: CPT | Mod: NTX | Performed by: SURGERY

## 2023-03-23 PROCEDURE — 86880 COOMBS TEST DIRECT: CPT | Mod: NTX | Performed by: SURGERY

## 2023-03-23 PROCEDURE — 88342 IMHCHEM/IMCYTCHM 1ST ANTB: CPT | Mod: 26,NTX,, | Performed by: PATHOLOGY

## 2023-03-23 PROCEDURE — D9220A PRA ANESTHESIA: ICD-10-PCS | Mod: CRNA,NTX,, | Performed by: NURSE ANESTHETIST, CERTIFIED REGISTERED

## 2023-03-23 PROCEDURE — 88341 PR IHC OR ICC EACH ADD'L SINGLE ANTIBODY  STAINPR: ICD-10-PCS | Mod: 26,NTX,, | Performed by: PATHOLOGY

## 2023-03-23 PROCEDURE — D9220A PRA ANESTHESIA: Mod: CRNA,NTX,, | Performed by: NURSE ANESTHETIST, CERTIFIED REGISTERED

## 2023-03-23 PROCEDURE — 63600175 PHARM REV CODE 636 W HCPCS: Mod: NTX | Performed by: STUDENT IN AN ORGANIZED HEALTH CARE EDUCATION/TRAINING PROGRAM

## 2023-03-23 PROCEDURE — 71000033 HC RECOVERY, INTIAL HOUR: Mod: NTX | Performed by: SURGERY

## 2023-03-23 PROCEDURE — 71000016 HC POSTOP RECOV ADDL HR: Mod: NTX | Performed by: SURGERY

## 2023-03-23 PROCEDURE — 27201423 OPTIME MED/SURG SUP & DEVICES STERILE SUPPLY: Mod: NTX | Performed by: SURGERY

## 2023-03-23 PROCEDURE — 88305 TISSUE EXAM BY PATHOLOGIST: CPT | Mod: NTX | Performed by: PATHOLOGY

## 2023-03-23 PROCEDURE — C1768 GRAFT, VASCULAR: HCPCS | Mod: NTX | Performed by: SURGERY

## 2023-03-23 PROCEDURE — 88342 IMHCHEM/IMCYTCHM 1ST ANTB: CPT | Mod: NTX | Performed by: PATHOLOGY

## 2023-03-23 PROCEDURE — 88305 TISSUE EXAM BY PATHOLOGIST: ICD-10-PCS | Mod: 26,NTX,, | Performed by: PATHOLOGY

## 2023-03-23 PROCEDURE — 36000712 HC OR TIME LEV V 1ST 15 MIN: Mod: NTX | Performed by: SURGERY

## 2023-03-23 PROCEDURE — 36000713 HC OR TIME LEV V EA ADD 15 MIN: Mod: NTX | Performed by: SURGERY

## 2023-03-23 PROCEDURE — 94761 N-INVAS EAR/PLS OXIMETRY MLT: CPT | Mod: NTX

## 2023-03-23 PROCEDURE — 88341 IMHCHEM/IMCYTCHM EA ADD ANTB: CPT | Mod: NTX | Performed by: PATHOLOGY

## 2023-03-23 PROCEDURE — 88305 TISSUE EXAM BY PATHOLOGIST: CPT | Mod: 26,NTX,, | Performed by: PATHOLOGY

## 2023-03-23 PROCEDURE — 43659 UNLISTED LAPS PX STOMACH: CPT | Mod: NTX,,, | Performed by: SURGERY

## 2023-03-23 PROCEDURE — 88341 IMHCHEM/IMCYTCHM EA ADD ANTB: CPT | Mod: 26,NTX,, | Performed by: PATHOLOGY

## 2023-03-23 PROCEDURE — 43280 LAPAROSCOPY FUNDOPLASTY: CPT | Mod: NTX,,, | Performed by: SURGERY

## 2023-03-23 DEVICE — FELT TEFLON 1INX6IN: Type: IMPLANTABLE DEVICE | Site: ABDOMEN | Status: FUNCTIONAL

## 2023-03-23 RX ORDER — MIDAZOLAM HYDROCHLORIDE 1 MG/ML
INJECTION, SOLUTION INTRAMUSCULAR; INTRAVENOUS
Status: DISCONTINUED | OUTPATIENT
Start: 2023-03-23 | End: 2023-03-23

## 2023-03-23 RX ORDER — ONDANSETRON 2 MG/ML
INJECTION INTRAMUSCULAR; INTRAVENOUS
Status: DISCONTINUED | OUTPATIENT
Start: 2023-03-23 | End: 2023-03-23

## 2023-03-23 RX ORDER — DEXAMETHASONE SODIUM PHOSPHATE 4 MG/ML
INJECTION, SOLUTION INTRA-ARTICULAR; INTRALESIONAL; INTRAMUSCULAR; INTRAVENOUS; SOFT TISSUE
Status: DISCONTINUED | OUTPATIENT
Start: 2023-03-23 | End: 2023-03-23

## 2023-03-23 RX ORDER — BUPIVACAINE HYDROCHLORIDE 5 MG/ML
INJECTION, SOLUTION EPIDURAL; INTRACAUDAL
Status: DISCONTINUED | OUTPATIENT
Start: 2023-03-23 | End: 2023-03-23 | Stop reason: HOSPADM

## 2023-03-23 RX ORDER — ROCURONIUM BROMIDE 10 MG/ML
INJECTION, SOLUTION INTRAVENOUS
Status: DISCONTINUED | OUTPATIENT
Start: 2023-03-23 | End: 2023-03-23

## 2023-03-23 RX ORDER — VASOPRESSIN 20 [USP'U]/ML
INJECTION, SOLUTION INTRAMUSCULAR; SUBCUTANEOUS
Status: DISCONTINUED | OUTPATIENT
Start: 2023-03-23 | End: 2023-03-23

## 2023-03-23 RX ORDER — ACETAMINOPHEN 10 MG/ML
INJECTION, SOLUTION INTRAVENOUS
Status: DISCONTINUED | OUTPATIENT
Start: 2023-03-23 | End: 2023-03-23

## 2023-03-23 RX ORDER — HYDROMORPHONE HYDROCHLORIDE 1 MG/ML
0.2 INJECTION, SOLUTION INTRAMUSCULAR; INTRAVENOUS; SUBCUTANEOUS EVERY 5 MIN PRN
Status: DISCONTINUED | OUTPATIENT
Start: 2023-03-23 | End: 2023-03-23 | Stop reason: HOSPADM

## 2023-03-23 RX ORDER — NIFEDIPINE 30 MG/1
30 TABLET, EXTENDED RELEASE ORAL NIGHTLY
Status: DISCONTINUED | OUTPATIENT
Start: 2023-03-23 | End: 2023-03-23

## 2023-03-23 RX ORDER — SODIUM CHLORIDE, SODIUM LACTATE, POTASSIUM CHLORIDE, CALCIUM CHLORIDE 600; 310; 30; 20 MG/100ML; MG/100ML; MG/100ML; MG/100ML
INJECTION, SOLUTION INTRAVENOUS CONTINUOUS
Status: DISCONTINUED | OUTPATIENT
Start: 2023-03-23 | End: 2023-03-24

## 2023-03-23 RX ORDER — ACETAMINOPHEN 650 MG/20.3ML
650 LIQUID ORAL EVERY 6 HOURS
Status: DISCONTINUED | OUTPATIENT
Start: 2023-03-23 | End: 2023-03-23

## 2023-03-23 RX ORDER — NIFEDIPINE 30 MG/1
90 TABLET, EXTENDED RELEASE ORAL NIGHTLY
Status: DISCONTINUED | OUTPATIENT
Start: 2023-03-23 | End: 2023-03-23

## 2023-03-23 RX ORDER — ONDANSETRON 2 MG/ML
4 INJECTION INTRAMUSCULAR; INTRAVENOUS ONCE AS NEEDED
Status: DISCONTINUED | OUTPATIENT
Start: 2023-03-23 | End: 2023-03-23 | Stop reason: HOSPADM

## 2023-03-23 RX ORDER — CARBOXYMETHYLCELLULOSE SODIUM 10 MG/ML
2 GEL OPHTHALMIC EVERY 4 HOURS PRN
Status: DISCONTINUED | OUTPATIENT
Start: 2023-03-23 | End: 2023-03-24 | Stop reason: HOSPADM

## 2023-03-23 RX ORDER — PANTOPRAZOLE SODIUM 40 MG/1
40 TABLET, DELAYED RELEASE ORAL DAILY
Status: DISCONTINUED | OUTPATIENT
Start: 2023-03-23 | End: 2023-03-24 | Stop reason: HOSPADM

## 2023-03-23 RX ORDER — PROPOFOL 10 MG/ML
VIAL (ML) INTRAVENOUS
Status: DISCONTINUED | OUTPATIENT
Start: 2023-03-23 | End: 2023-03-23

## 2023-03-23 RX ORDER — SODIUM CHLORIDE 9 MG/ML
INJECTION, SOLUTION INTRAVENOUS CONTINUOUS
Status: DISCONTINUED | OUTPATIENT
Start: 2023-03-23 | End: 2023-03-24

## 2023-03-23 RX ORDER — DROPERIDOL 2.5 MG/ML
0.62 INJECTION, SOLUTION INTRAMUSCULAR; INTRAVENOUS ONCE AS NEEDED
Status: DISCONTINUED | OUTPATIENT
Start: 2023-03-23 | End: 2023-03-23 | Stop reason: HOSPADM

## 2023-03-23 RX ORDER — NIFEDIPINE 30 MG/1
90 TABLET, EXTENDED RELEASE ORAL DAILY
Status: DISCONTINUED | OUTPATIENT
Start: 2023-03-23 | End: 2023-03-23

## 2023-03-23 RX ORDER — DEXMEDETOMIDINE HYDROCHLORIDE 100 UG/ML
INJECTION, SOLUTION INTRAVENOUS
Status: DISCONTINUED | OUTPATIENT
Start: 2023-03-23 | End: 2023-03-23

## 2023-03-23 RX ORDER — NIFEDIPINE 30 MG/1
120 TABLET, EXTENDED RELEASE ORAL NIGHTLY
Status: DISCONTINUED | OUTPATIENT
Start: 2023-03-23 | End: 2023-03-24 | Stop reason: HOSPADM

## 2023-03-23 RX ORDER — LIDOCAINE HYDROCHLORIDE 20 MG/ML
INJECTION, SOLUTION EPIDURAL; INFILTRATION; INTRACAUDAL; PERINEURAL
Status: DISCONTINUED | OUTPATIENT
Start: 2023-03-23 | End: 2023-03-23

## 2023-03-23 RX ORDER — GABAPENTIN 250 MG/5ML
125 SOLUTION ORAL EVERY 8 HOURS
Status: DISCONTINUED | OUTPATIENT
Start: 2023-03-23 | End: 2023-03-24 | Stop reason: HOSPADM

## 2023-03-23 RX ORDER — FENTANYL CITRATE 50 UG/ML
INJECTION, SOLUTION INTRAMUSCULAR; INTRAVENOUS
Status: DISCONTINUED | OUTPATIENT
Start: 2023-03-23 | End: 2023-03-23

## 2023-03-23 RX ORDER — ALBUTEROL SULFATE 90 UG/1
2 AEROSOL, METERED RESPIRATORY (INHALATION) EVERY 4 HOURS PRN
Status: DISCONTINUED | OUTPATIENT
Start: 2023-03-23 | End: 2023-03-24 | Stop reason: HOSPADM

## 2023-03-23 RX ORDER — ACETAMINOPHEN 325 MG/1
650 TABLET ORAL EVERY 6 HOURS
Status: DISCONTINUED | OUTPATIENT
Start: 2023-03-23 | End: 2023-03-24 | Stop reason: HOSPADM

## 2023-03-23 RX ORDER — TADALAFIL 20 MG/1
40 TABLET ORAL NIGHTLY
Status: DISCONTINUED | OUTPATIENT
Start: 2023-03-23 | End: 2023-03-24 | Stop reason: HOSPADM

## 2023-03-23 RX ADMIN — FENTANYL CITRATE 100 MCG: 50 INJECTION INTRAMUSCULAR; INTRAVENOUS at 11:03

## 2023-03-23 RX ADMIN — ACETAMINOPHEN 1000 MG: 10 INJECTION INTRAVENOUS at 12:03

## 2023-03-23 RX ADMIN — VASOPRESSIN 1 UNITS: 20 INJECTION INTRAVENOUS at 12:03

## 2023-03-23 RX ADMIN — VASOPRESSIN 1 UNITS: 20 INJECTION INTRAVENOUS at 01:03

## 2023-03-23 RX ADMIN — HYDROMORPHONE HYDROCHLORIDE 0.2 MG: 1 INJECTION, SOLUTION INTRAMUSCULAR; INTRAVENOUS; SUBCUTANEOUS at 03:03

## 2023-03-23 RX ADMIN — SODIUM CHLORIDE: 0.9 INJECTION, SOLUTION INTRAVENOUS at 11:03

## 2023-03-23 RX ADMIN — DEXMEDETOMIDINE 12 MCG: 100 INJECTION, SOLUTION INTRAVENOUS at 02:03

## 2023-03-23 RX ADMIN — ROCURONIUM BROMIDE 50 MG: 10 INJECTION INTRAVENOUS at 11:03

## 2023-03-23 RX ADMIN — VASOPRESSIN 1 UNITS: 20 INJECTION INTRAVENOUS at 11:03

## 2023-03-23 RX ADMIN — METHOCARBAMOL 500 MG: 100 INJECTION, SOLUTION INTRAMUSCULAR; INTRAVENOUS at 05:03

## 2023-03-23 RX ADMIN — ONDANSETRON 4 MG: 2 INJECTION INTRAMUSCULAR; INTRAVENOUS at 02:03

## 2023-03-23 RX ADMIN — ACETAMINOPHEN 650 MG: 325 TABLET ORAL at 11:03

## 2023-03-23 RX ADMIN — HYDROMORPHONE HYDROCHLORIDE 0.2 MG: 1 INJECTION, SOLUTION INTRAMUSCULAR; INTRAVENOUS; SUBCUTANEOUS at 04:03

## 2023-03-23 RX ADMIN — PROPOFOL 150 MG: 10 INJECTION, EMULSION INTRAVENOUS at 11:03

## 2023-03-23 RX ADMIN — SUGAMMADEX 300 MG: 100 INJECTION, SOLUTION INTRAVENOUS at 02:03

## 2023-03-23 RX ADMIN — ROCURONIUM BROMIDE 10 MG: 10 INJECTION INTRAVENOUS at 12:03

## 2023-03-23 RX ADMIN — HYDROMORPHONE HYDROCHLORIDE 0.2 MG: 1 INJECTION, SOLUTION INTRAMUSCULAR; INTRAVENOUS; SUBCUTANEOUS at 05:03

## 2023-03-23 RX ADMIN — CEFAZOLIN 2 G: 2 INJECTION, POWDER, FOR SOLUTION INTRAMUSCULAR; INTRAVENOUS at 11:03

## 2023-03-23 RX ADMIN — SODIUM CHLORIDE, SODIUM GLUCONATE, SODIUM ACETATE, POTASSIUM CHLORIDE, MAGNESIUM CHLORIDE, SODIUM PHOSPHATE, DIBASIC, AND POTASSIUM PHOSPHATE: .53; .5; .37; .037; .03; .012; .00082 INJECTION, SOLUTION INTRAVENOUS at 12:03

## 2023-03-23 RX ADMIN — LIDOCAINE HYDROCHLORIDE 60 MG: 20 INJECTION, SOLUTION EPIDURAL; INFILTRATION; INTRACAUDAL at 11:03

## 2023-03-23 RX ADMIN — MIDAZOLAM 2 MG: 1 INJECTION INTRAMUSCULAR; INTRAVENOUS at 11:03

## 2023-03-23 RX ADMIN — SODIUM CHLORIDE, POTASSIUM CHLORIDE, SODIUM LACTATE AND CALCIUM CHLORIDE: 600; 310; 30; 20 INJECTION, SOLUTION INTRAVENOUS at 02:03

## 2023-03-23 RX ADMIN — PANTOPRAZOLE SODIUM 40 MG: 40 TABLET, DELAYED RELEASE ORAL at 03:03

## 2023-03-23 RX ADMIN — GABAPENTIN 125 MG: 250 SOLUTION ORAL at 09:03

## 2023-03-23 RX ADMIN — ACETAMINOPHEN 650 MG: 325 TABLET ORAL at 05:03

## 2023-03-23 RX ADMIN — NIFEDIPINE 120 MG: 30 TABLET, FILM COATED, EXTENDED RELEASE ORAL at 08:03

## 2023-03-23 RX ADMIN — ROCURONIUM BROMIDE 10 MG: 10 INJECTION INTRAVENOUS at 01:03

## 2023-03-23 RX ADMIN — TADALAFIL 40 MG: 20 TABLET, FILM COATED ORAL at 08:03

## 2023-03-23 RX ADMIN — DEXAMETHASONE SODIUM PHOSPHATE 8 MG: 4 INJECTION INTRA-ARTICULAR; INTRALESIONAL; INTRAMUSCULAR; INTRAVENOUS; SOFT TISSUE at 11:03

## 2023-03-23 RX ADMIN — GLYCOPYRROLATE 0.2 MG: 0.2 INJECTION INTRAMUSCULAR; INTRAVENOUS at 12:03

## 2023-03-23 RX ADMIN — GABAPENTIN 125 MG: 250 SOLUTION ORAL at 03:03

## 2023-03-23 NOTE — BRIEF OP NOTE
[facility] - Surgery ([facility])  Brief Operative Note    SUMMARY     Surgery Date: 3/23/2023     Surgeon(s) and Role:     * Joe Segura MD - Primary     * CHERYLE Calabrese MD - Resident - Assisting     * Yifan Jimenez MD - Consult        Pre-op Diagnosis:  Hiatal hernia with GERD and esophagitis [K44.9, K21.00]    Post-op Diagnosis:  Post-Op Diagnosis Codes:     * Hiatal hernia with GERD and esophagitis [K44.9, K21.00]    Procedure(s) (LRB):  XI ROBOTIC REPAIR, HERNIA, HIATAL (N/A)  XI ROBOTIC PYLOROMYOTOMY (N/A)  REPAIR, HERNIA, UMBILICAL (N/A)  XI ROBOTIC GASTROPEXY (N/A)  XI ROBOTIC EXCISION, LESION, STOMACH (N/A)    Anesthesia: General    Operative Findings:   Small hiatal hernia  Subcentimeter sub-serosal nodule of the fundus, which was removed and sent for permanent    Estimated Blood Loss: 15 ml    Estimated Blood Loss has been documented.         Specimens:   Specimen (24h ago, onward)       Start     Ordered    03/23/23 1344  Specimen to Pathology, Surgery General Surgery  Once        Comments: Pre-op Diagnosis: Hiatal hernia with GERD and esophagitis [K44.9, K21.00]Procedure(s):XI ROBOTIC REPAIR, HERNIA, HIATAL (WITHOUT FUNDOPLICATION)XI ROBOTIC PYLOROMYOTOMY Number of specimens: 1Name of specimens: 1) Subserosal nodule / Stomach (Permanent)     References:    Click here for ordering Quick Tip   Question Answer Comment   Procedure Type: General Surgery    Specimen Class: Routine/Screening    Which provider would you like to cc? JOE GARIBAY    Release to patient Immediate        03/23/23 1407                    [id number]      CHERYLE Calabrese MD   General Surgery, PGY-5

## 2023-03-23 NOTE — ANESTHESIA PREPROCEDURE EVALUATION
03/23/2023  Yamel Shah is a 72 y.o., female.      Pre-op Assessment          Review of Systems  Anesthesia Hx:  No problems with previous Anesthesia    Cardiovascular:   Hypertension Denies CAD.     Functional Capacity Can you climb two flights of stairs? ==> Yes    Pulmonary:   Denies Asthma.  Denies Sleep Apnea.       Interstitial lung disease, uses inhalers PRN, no home O2. LAst inhaler about a month ago, none needed today.         Renal/:   Denies Chronic Renal Disease.     Hepatic/GI:   Denies PUD. GERD Denies Liver Disease.    Musculoskeletal:           Sjogrens, raynauds, scleroderma with finger and lung involvement       Neurological:   Denies CVA. Denies Seizures.    Endocrine:   Denies Diabetes. Denies Hypothyroidism.        Physical Exam  General: Alert    Airway:  Mallampati: I   Mouth Opening: Normal  TM Distance: Normal  Tongue: Normal  Neck ROM: Normal ROM    Dental:  Intact, Caps / Implants        Anesthesia Plan  Type of Anesthesia, risks & benefits discussed:    Anesthesia Type: Gen ETT  Intra-op Monitoring Plan: Standard ASA Monitors  Post Op Pain Control Plan: multimodal analgesia and IV/PO Opioids PRN  Induction:  IV  Airway Plan: Direct  Informed Consent: Informed consent signed with the Patient and all parties understand the risks and agree with anesthesia plan.  All questions answered.   ASA Score: 3    Ready For Surgery From Anesthesia Perspective.     .

## 2023-03-23 NOTE — TRANSFER OF CARE
Anesthesia Transfer of Care Note    Patient: Yamel Shah    Procedure(s) Performed: Procedure(s) (LRB):  XI ROBOTIC REPAIR, HERNIA, HIATAL (N/A)  XI ROBOTIC PYLOROMYOTOMY (N/A)  REPAIR, HERNIA, UMBILICAL (N/A)  XI ROBOTIC GASTROPEXY (N/A)  XI ROBOTIC EXCISION, LESION, STOMACH (N/A)    Patient location: PACU    Anesthesia Type: general    Transport from OR: Transported from OR on 6-10 L/min O2 by face mask with adequate spontaneous ventilation    Post pain: adequate analgesia    Post assessment: no apparent anesthetic complications    Post vital signs: stable    Level of consciousness: awake and responds to stimulation    Nausea/Vomiting: no nausea/vomiting    Complications: none    Transfer of care protocol was followed      Last vitals:   Visit Vitals  BP (!) 164/66 (BP Location: Right arm, Patient Position: Lying)   Pulse 72   Temp 36.6 °C (97.9 °F) (Oral)   Resp 18   SpO2 98%   Breastfeeding No

## 2023-03-23 NOTE — OP NOTE
DATE OF PROCEDURE: 3/23/2023    PRE OP DIAGNOSIS: Hiatal hernia with GERD and esophagitis [K44.9, K21.00]  Pagan's esophagus without dysplasia  Scleroderma esophagus  Gastroparesis  Scleroderma  Interstitial lung disease  Pulmonary hypertension    POST OP DIAGNOSIS: Hiatal hernia with GERD and esophagitis [K44.9, K21.00]  Pagan's esophagus without dysplasia  Scleroderma esophagus  Gastroparesis  Scleroderma  Interstitial lung disease  Pulmonary hypertension    PROCEDURE: Procedure(s) (LRB):  XI ROBOTIC REPAIR, HERNIA, HIATAL (N/A)  XI ROBOTIC PYLOROMYOTOMY (N/A)  REPAIR, HERNIA, UMBILICAL (N/A)  XI ROBOTIC GASTROPEXY (N/A)  XI ROBOTIC EXCISION, LESION, STOMACH (N/A)    Surgeon(s) and Role:     * Katherine Segura MD - Primary     * CHERYLE Calabrese MD - Resident - Assisting     * Yifan Jimenez MD - Consult    ANESTHESIA: General    INDICATION: Patient is a 72 year-old woman with scleroderma esophagus, a sliding-type hiatal hernia with GERD and nondysplastic Pagan's, and gastroparesis, with symptoms refractory to medication and lifestyle management. After discussing the risks, benefits and alternatives to hiatal hernia repair with or without fundoplication and concomitant pyloromyotomy, she elected to proceed with robotic hiatal hernia repair with pyloromyotomy and no fundoplication, understanding she may need to continue antacid medications post-operatively. Informed consent was obtained.      FINDINGS:  1. Sliding-type 1 hiatal hernia, reduced and repaired.  2. 4 cm of intra-abdominal esophagus following reduction.  3. Gastropexy performed to L diaphragm.  4. Identification and preservation of both the anterior and posterior vagal nerves.  5. Subserosal nodule on anterior surface of proximal stomach, excised.  6. Small umbilical hernia, repaired primarily.  7. Pyloromyotomy.     PROCEDURE IN DETAIL: The patient was met in the pre-op area and her identity and consent confirmed. She was then  brought to the Operating Room and placed supine. General anesthesia was induced and she was intubated without incident. SCDs and a warming blanket were placed. An orogastric tube was placed, the stomach aspirated, and the OG removed. Pre-op antibiotics were infused within 30 minutes of the incision. The abdomen was prepped and draped in sterile fashion and a pre-procedural pause performed by all members of the surgical and anesthesia teams. A 1-cm umbilical hernia was noted. A curvilinear infraumbilical incision was made and dissection carried through the soft tissue to the fascia. A hemostat was used to dissect around the umbilical stalk and hernia sac circumferentially and the sac  from the underside of the stalk sharply with Metzenbaum scissors. The peritoneum was opened and an 8-mm trocar placed through the defect. The abdomen was insufflated to 15 mmHg and inspection yielded no injury. Two LLQ and one RLQ 8-mm trocars were placed in line with the initial port under direct vision. A R lateral subcostal 5-mm port was placed for a liver retractor to expose the esophageal hiatus. The patient was placed in reverse Trendelenburg and the Xi robot docked with the camera port at the umbilicus. At this point I scrubbed out and moved to the surgeons console. We started with the pyloromyotomy. The serosa was scored anteriorly over the pylorus muscle using the monopolar hook cautery. The circular muscles were then divided using gentle traction with long needle  forceps until the mucosa was the only layer remaining. We then turned out attention to the hiatus. A small sliding-type 1 hiatal hernia was noted. The pars flaccida and short gastric vessels were divided with the Synchroseal. Using this and blunt dissection, the hernia sac was divided from the crura and brought into the abdomen. The EGJ was controlled circumferentially and the mediastinal attachments to the esophagus were bluntly divided until the  stomach was fully reduced and the esophagus straigtened. The anterior and posterior vagus nerves were identified and preserved with the esophagus throughout. After dissection 4 cm of esophagus laid freely within the abdominal cavity without tension. The crural opening was medium in size and was reapproximated posteriorly with running 0-Vicryl with pledgets on either side. Once complete a grasper could just be placed between the crural closure and esophagus. A subserosal nodule was noted on the anterior wall of the proximal stomach which was sharply excised. This was oversewn with a figure-of-eight seromuscular bite using 2-0 Vicryl. The proximal greater curve was pexied to the underside of the left diaphragem in two locations with 2-0 Vicryl. The stomach lay nicely in the abdomen with good esophageal length without tension. The abdomen was inspected and hemostasis ensured. The liver retractor was removed. The robot was undocked and I scrubbed back in. The trocars were removed and the abdomen desufflated. The umbilical defect was closed with a figure-of-eight 0-Vicryl suture. All skin incisions were closed with 4-0 Monocryl and reinforced with Dermabond. The patient tolerated the operation well. She was extubated without incident and brought to the PACU in good condition. Sponge and needle counts were correct at the end of the case.      EBL: 5 mL  Specimens: Submucosal nodule, stomach  Drains: None  Complications: None apparent  Dispo: Surgical floor     I was present and scrubbed (or at the surgeon's console) for the entirety of this operation.     Katherine Segura  3/23/2023

## 2023-03-23 NOTE — ANESTHESIA PROCEDURE NOTES
Intubation    Date/Time: 3/23/2023 11:45 AM  Performed by: Manjula Booth CRNA  Authorized by: Rigo Ayoub MD     Intubation:     Induction:  Intravenous    Intubated:  Postinduction    Mask Ventilation:  Easy mask    Attempts:  1    Attempted By:  CRNA    Method of Intubation:  Video laryngoscopy    Blade:  West 3    Laryngeal View Grade: Grade IIb - only the arytenoids and epiglottis seen      Difficult Airway Encountered?: No      Complications:  None    Airway Device:  Oral endotracheal tube    Airway Device Size:  7.0    Style/Cuff Inflation:  Cuffed (inflated to minimal occlusive pressure)    Inflation Amount (mL):  7    Tube secured:  21    Secured at:  The lips    Placement Verified By:  Capnometry    Complicating Factors:  None    Findings Post-Intubation:  BS equal bilateral and atraumatic/condition of teeth unchanged

## 2023-03-24 VITALS
RESPIRATION RATE: 18 BRPM | OXYGEN SATURATION: 95 % | DIASTOLIC BLOOD PRESSURE: 74 MMHG | TEMPERATURE: 98 F | HEART RATE: 70 BPM | SYSTOLIC BLOOD PRESSURE: 130 MMHG

## 2023-03-24 PROBLEM — K44.9 HIATAL HERNIA: Status: ACTIVE | Noted: 2023-03-24

## 2023-03-24 LAB
ANION GAP SERPL CALC-SCNC: 8 MMOL/L (ref 8–16)
BASOPHILS # BLD AUTO: 0.01 K/UL (ref 0–0.2)
BASOPHILS NFR BLD: 0.2 % (ref 0–1.9)
BUN SERPL-MCNC: 10 MG/DL (ref 8–23)
CALCIUM SERPL-MCNC: 8.8 MG/DL (ref 8.7–10.5)
CHLORIDE SERPL-SCNC: 107 MMOL/L (ref 95–110)
CO2 SERPL-SCNC: 23 MMOL/L (ref 23–29)
CREAT SERPL-MCNC: 0.6 MG/DL (ref 0.5–1.4)
DIFFERENTIAL METHOD: ABNORMAL
EOSINOPHIL # BLD AUTO: 0 K/UL (ref 0–0.5)
EOSINOPHIL NFR BLD: 0 % (ref 0–8)
ERYTHROCYTE [DISTWIDTH] IN BLOOD BY AUTOMATED COUNT: 15.1 % (ref 11.5–14.5)
EST. GFR  (NO RACE VARIABLE): >60 ML/MIN/1.73 M^2
GLUCOSE SERPL-MCNC: 92 MG/DL (ref 70–110)
HCT VFR BLD AUTO: 35.2 % (ref 37–48.5)
HGB BLD-MCNC: 11.3 G/DL (ref 12–16)
IMM GRANULOCYTES # BLD AUTO: 0.01 K/UL (ref 0–0.04)
IMM GRANULOCYTES NFR BLD AUTO: 0.2 % (ref 0–0.5)
LYMPHOCYTES # BLD AUTO: 1 K/UL (ref 1–4.8)
LYMPHOCYTES NFR BLD: 19.6 % (ref 18–48)
MAGNESIUM SERPL-MCNC: 1.9 MG/DL (ref 1.6–2.6)
MCH RBC QN AUTO: 30.7 PG (ref 27–31)
MCHC RBC AUTO-ENTMCNC: 32.1 G/DL (ref 32–36)
MCV RBC AUTO: 96 FL (ref 82–98)
MONOCYTES # BLD AUTO: 0.5 K/UL (ref 0.3–1)
MONOCYTES NFR BLD: 9.4 % (ref 4–15)
NEUTROPHILS # BLD AUTO: 3.5 K/UL (ref 1.8–7.7)
NEUTROPHILS NFR BLD: 70.6 % (ref 38–73)
NRBC BLD-RTO: 0 /100 WBC
PHOSPHATE SERPL-MCNC: 3.5 MG/DL (ref 2.7–4.5)
PLATELET # BLD AUTO: 201 K/UL (ref 150–450)
PMV BLD AUTO: 12.6 FL (ref 9.2–12.9)
POTASSIUM SERPL-SCNC: 3.9 MMOL/L (ref 3.5–5.1)
RBC # BLD AUTO: 3.68 M/UL (ref 4–5.4)
SODIUM SERPL-SCNC: 138 MMOL/L (ref 136–145)
WBC # BLD AUTO: 4.91 K/UL (ref 3.9–12.7)

## 2023-03-24 PROCEDURE — 63600175 PHARM REV CODE 636 W HCPCS: Mod: NTX | Performed by: STUDENT IN AN ORGANIZED HEALTH CARE EDUCATION/TRAINING PROGRAM

## 2023-03-24 PROCEDURE — 85025 COMPLETE CBC W/AUTO DIFF WBC: CPT | Mod: NTX | Performed by: STUDENT IN AN ORGANIZED HEALTH CARE EDUCATION/TRAINING PROGRAM

## 2023-03-24 PROCEDURE — 25000003 PHARM REV CODE 250: Mod: NTX | Performed by: STUDENT IN AN ORGANIZED HEALTH CARE EDUCATION/TRAINING PROGRAM

## 2023-03-24 PROCEDURE — 80048 BASIC METABOLIC PNL TOTAL CA: CPT | Mod: NTX | Performed by: STUDENT IN AN ORGANIZED HEALTH CARE EDUCATION/TRAINING PROGRAM

## 2023-03-24 PROCEDURE — 83735 ASSAY OF MAGNESIUM: CPT | Mod: NTX | Performed by: STUDENT IN AN ORGANIZED HEALTH CARE EDUCATION/TRAINING PROGRAM

## 2023-03-24 PROCEDURE — 84100 ASSAY OF PHOSPHORUS: CPT | Mod: NTX | Performed by: STUDENT IN AN ORGANIZED HEALTH CARE EDUCATION/TRAINING PROGRAM

## 2023-03-24 PROCEDURE — 25000003 PHARM REV CODE 250: Mod: NTX | Performed by: SURGERY

## 2023-03-24 PROCEDURE — 36415 COLL VENOUS BLD VENIPUNCTURE: CPT | Mod: NTX | Performed by: STUDENT IN AN ORGANIZED HEALTH CARE EDUCATION/TRAINING PROGRAM

## 2023-03-24 RX ORDER — METHOCARBAMOL 500 MG/1
500 TABLET, FILM COATED ORAL 4 TIMES DAILY
Status: DISCONTINUED | OUTPATIENT
Start: 2023-03-24 | End: 2023-03-24 | Stop reason: HOSPADM

## 2023-03-24 RX ORDER — METHOCARBAMOL 500 MG/1
500 TABLET, FILM COATED ORAL 4 TIMES DAILY
Qty: 40 TABLET | Refills: 0 | Status: SHIPPED | OUTPATIENT
Start: 2023-03-24 | End: 2023-04-03

## 2023-03-24 RX ORDER — GABAPENTIN 300 MG/1
300 CAPSULE ORAL EVERY 8 HOURS
Qty: 30 CAPSULE | Refills: 0 | Status: SHIPPED | OUTPATIENT
Start: 2023-03-24 | End: 2023-04-03

## 2023-03-24 RX ADMIN — METHOCARBAMOL 500 MG: 100 INJECTION, SOLUTION INTRAMUSCULAR; INTRAVENOUS at 12:03

## 2023-03-24 RX ADMIN — PANTOPRAZOLE SODIUM 40 MG: 40 TABLET, DELAYED RELEASE ORAL at 08:03

## 2023-03-24 RX ADMIN — SODIUM CHLORIDE, POTASSIUM CHLORIDE, SODIUM LACTATE AND CALCIUM CHLORIDE: 600; 310; 30; 20 INJECTION, SOLUTION INTRAVENOUS at 12:03

## 2023-03-24 RX ADMIN — GABAPENTIN 125 MG: 250 SOLUTION ORAL at 03:03

## 2023-03-24 RX ADMIN — GABAPENTIN 125 MG: 250 SOLUTION ORAL at 05:03

## 2023-03-24 RX ADMIN — METHOCARBAMOL 500 MG: 500 TABLET ORAL at 01:03

## 2023-03-24 RX ADMIN — ACETAMINOPHEN 650 MG: 325 TABLET ORAL at 05:03

## 2023-03-24 RX ADMIN — METHOCARBAMOL 500 MG: 100 INJECTION, SOLUTION INTRAMUSCULAR; INTRAVENOUS at 05:03

## 2023-03-24 RX ADMIN — ACETAMINOPHEN 650 MG: 325 TABLET ORAL at 12:03

## 2023-03-24 NOTE — CONSULTS
Food & Nutrition  Education    Diet Education: Post-op POEM diet  Time Spent: 15  Learners: patient and     Nutrition Education provided with handouts: Diet After Gastric Surgery    Comments: Pt agreed to review education materials - encouraged full liquid diet x 1 week followed by post-nissen diet x 1 week. Discussed ways to prevent stomach stretching and excess gas to avoid possible complications. All questions and concerns answered. Dietitian's contact information provided.       Follow-Up: 3/31/23    Please Re-consult as needed.    Thanks!    Alida Gar, Registration Eligible, Provisional LDN

## 2023-03-24 NOTE — PROGRESS NOTES
[facility]a - Surgery  General Surgery  Progress Note    Subjective:     History of Present Illness:  No notes on file    Post-Op Info:  Procedure(s) (LRB):  XI ROBOTIC REPAIR, HERNIA, HIATAL (N/A)  XI ROBOTIC PYLOROMYOTOMY (N/A)  REPAIR, HERNIA, UMBILICAL (N/A)  XI ROBOTIC GASTROPEXY (N/A)  XI ROBOTIC EXCISION, LESION, STOMACH (N/A)   1 Day Post-Op     Interval History: Patient had robotic HH repair yesterday. NAEON, afebrile, HDS. Pain is well controlled. Refers her night was not that good because of headache.She was erroneously given jello cups which she took but did not have any issues with them.    Medications:  Continuous Infusions:  Scheduled Meds:   acetaminophen  650 mg Oral Q6H    gabapentin  125 mg Oral Q8H    methocarbamol (ROBAXIN) IVPB  500 mg Intravenous Q6H    NIFEdipine  120 mg Oral QHS    pantoprazole  40 mg Oral Daily    tadalafil  40 mg Oral Nightly     PRN Meds:albuterol, carboxymethylcellulose sodium     Review of patient's allergies indicates:   Allergen Reactions    Sulfa (sulfonamide antibiotics)      Other reaction(s): Rash    Tramadol Itching     Objective:     Vital Signs (Most Recent):  Temp: 97.8 °F (36.6 °C) (03/24/23 0721)  Pulse: 69 (03/24/23 0721)  Resp: 16 (03/24/23 0721)  BP: 138/65 (03/24/23 0721)  SpO2: (!) 93 % (03/24/23 0721)   Vital Signs (24h Range):  Temp:  [96.6 °F (35.9 °C)-98.2 °F (36.8 °C)] 97.8 °F (36.6 °C)  Pulse:  [66-78] 69  Resp:  [12-24] 16  SpO2:  [93 %-98 %] 93 %  BP: (119-169)/(57-74) 138/65        There is no height or weight on file to calculate BMI.    Intake/Output - Last 3 Shifts         03/22 0700  03/23 0659 03/23 0700  03/24 0659 03/24 0700  03/25 0659    IV Piggyback  1500     Total Intake  1500     Urine  3650     Total Output  3650     Net  -2150                    Physical Exam  Constitutional:       General: She is not in acute distress.     Appearance: Normal appearance.   HENT:      Mouth/Throat:      Mouth: Mucous membranes are moist.    Eyes:      Extraocular Movements: Extraocular movements intact.   Cardiovascular:      Rate and Rhythm: Normal rate and regular rhythm.   Pulmonary:      Effort: Pulmonary effort is normal. No respiratory distress.      Breath sounds: Normal breath sounds.   Abdominal:      General: There is no distension.      Palpations: Abdomen is soft.      Tenderness: There is abdominal tenderness. There is no guarding.      Comments: Incisions c/d/I  Appropriately TTP   Skin:     General: Skin is warm.   Neurological:      Mental Status: She is alert and oriented to person, place, and time. Mental status is at baseline.       Significant Labs:  I have reviewed all pertinent lab results within the past 24 hours.  CBC:   Recent Labs   Lab 03/24/23  0337   WBC 4.91   RBC 3.68*   HGB 11.3*   HCT 35.2*      MCV 96   MCH 30.7   MCHC 32.1     CMP:   Recent Labs   Lab 03/24/23  0337   GLU 92   CALCIUM 8.8      K 3.9   CO2 23      BUN 10   CREATININE 0.6       Significant Diagnostics:  I have reviewed all pertinent imaging results/findings within the past 24 hours.    Assessment/Plan:     * Hiatal hernia  Yamel Shah is a 72 y.o. femlae w/ PMHx including PHTN, GERD, scleroderma and hiatal hernia now s/p robotic hiatal hernia repair, gastric nodule excision, pyloromyotomy, and primary repair of umbilical hernia 1 Day Post-Op    - Pain control: multimodal  - Home medications as appropriate  - Encourage IS, acapella  - Diet: CLD     - Nutrition consult for dietary education. FLD for 1 week followed by soft diet for another week or until seen in clinic  - Bowel regimen  - PPX: SCD  - OOBTC, encourage ambulation    Dispo: possible discharge today if no changes on status          Fox Heredia MD  General Surgery  [facility] - Surgery

## 2023-03-24 NOTE — ASSESSMENT & PLAN NOTE
Yamel Shah is a 72 y.o. femlae w/ PMHx including PHTN, GERD, scleroderma and hiatal hernia now s/p robotic hiatal hernia repair, gastric nodule excision, pyloromyotomy, and primary repair of umbilical hernia 1 Day Post-Op    - Pain control: multimodal  - Home medications as appropriate  - Encourage IS, acapella  - Diet: CLD     - Nutrition consult for dietary education. FLD for 1 week followed by soft diet for another week or until seen in clinic  - Bowel regimen  - PPX: SCD  - OOBTC, encourage ambulation    Dispo: possible discharge today if no changes on status

## 2023-03-24 NOTE — HOSPITAL COURSE
Please see the preoperative H&P and other available documentation for full details related to history prior to this admission.  Briefly, Yamel Shah is a 72 y.o. female who was admitted following scheduled elective surgery for Hiatal hernia. They underwent the following procedures: robotic hiatal hernia repair and pyloroplasty    Following a complete preoperative discussion of the risks and benefits of surgery with signed informed consent, the patient was taken to the operating room on 3/23/2023 and underwent the above stated procedures. The patient tolerated surgery well and there were no complications. Please see the operative report for full intraoperative findings and details. Postoperatively, the patient did well and was transferred from the PACU to the floor in stable condition where they had a stable and uncomplicated hospital course. Labs and vital signs remained stable and appropriate throughout course. Diet was advanced as tolerated and the patient's pain was controlled on oral pain medications without problem. Ambulating without issue. Voiding without issue with adequate urine output. Passing gas and stool. Incision site is clean, dry, and intact.    Currently, the patient is doing well at 1 Day Post-Op and is stable and appropriate for discharge home at this time. Patient will follow up in clinic in 2 weeks.

## 2023-03-24 NOTE — PT/OT/SLP PROGRESS
Physical Therapy      Patient Name:  Yamel Shah   MRN:  [id number]    PT discussed pt status with RN. RN reported pt is ambulating in hallway without assistance, no concerns regarding mobility. Will discontinue orders. Please re-consult if pt experiences a change in status.

## 2023-03-24 NOTE — PLAN OF CARE
[facility] - Surgery  Initial Discharge Assessment       Primary Care Provider: Aly Rand MD    Admission Diagnosis: Hiatal hernia with GERD and esophagitis [K44.9, K21.00]  Hiatal hernia [K44.9]    Admission Date: 3/23/2023  Expected Discharge Date: 3/24/2023         Payor: MEDICARE / Plan: MEDICARE PART A & B / Product Type: Government /     Extended Emergency Contact Information  Primary Emergency Contact: PabloLewis  Address: [address] [place] [address] [facility]  Home Phone: [phone]  Mobile Phone: [phone]  Relation: Spouse    Discharge Plan A: Home with family  Discharge Plan B: Home with family      [id number] - [place], [place] - [address] AT [place] [address]  [address] [facility] [address]  Phone: [phone] Fax: [phone]    [facility]  [address] [facility] [address]  Phone: [phone] Fax: [phone]    [facility] [place]  [place] - [address]  [address]  [place] [address]  Phone: [phone] Fax: [phone]    [id number] - [place], [place] - [address] [facility] [address]  [address]  [facility] [address]  Phone: [phone] Fax: [phone]    [facility] [address] [place] [address]  Phone: [phone] Fax: [phone]    [id number] - [place], [place] - [address] [facility]  [address] [place] [address]  Phone: [phone] Fax: [phone]      Initial Assessment (most recent)       Adult Discharge Assessment - 03/24/23 1355          Discharge Assessment    Assessment Type Discharge Planning Assessment     Confirmed/corrected address, phone number and insurance Yes     Confirmed Demographics Correct on Facesheet     Source of Information patient     Does patient/caregiver understand observation  status Yes     Communicated VALENCIA with patient/caregiver Yes     People in Home spouse     Do you expect to return to your current living situation? Yes     Do you have help at home or someone to help you manage your care at home? Yes     Who are your caregiver(s) and their phone number(s)? Lewis Shah (Spouse) [phone]     Prior to hospitilization cognitive status: Alert/Oriented     Current cognitive status: Alert/Oriented     Walking or Climbing Stairs ambulation difficulty, requires equipment     Equipment Currently Used at Home none     Readmission within 30 days? No     Patient currently being followed by outpatient case management? No     Do you currently have service(s) that help you manage your care at home? No     Do you take prescription medications? Yes     Do you have prescription coverage? Yes     Do you have any problems affording any of your prescribed medications? No     Is the patient taking medications as prescribed? yes     Who is going to help you get home at discharge? Spouse     How do you get to doctors appointments? family or friend will provide     Are you on dialysis? No     Do you take coumadin? No     Discharge Plan A Home with family     Discharge Plan B Home with family                     Spoke with patient and spouse to complete d/c planning assessment. Patient lives with spouse in a Two-Story home with 4 steps to enter. Independent with ADL's. No DME in Home. Verified PCP, Pharmacy and health insurance. Patient will have help at home from spouse. No d/c needs noted. Will d/c home later today.    Philomena MCKEON  Case Management  [facility]  [phone]

## 2023-03-24 NOTE — DISCHARGE INSTRUCTIONS
Surgery Post Op Instructions:    You had a hiatal hernia repair and pyloromyotomy performed.     Wound care:  Your incision is covered with Dermabond, a type of surgical super glue. You may shower tomorrow - let soapy water run over the incision but do not scrub the area. You must avoid submerging the incision in pools, bathtubs, etc until it is fully healed in 4-6 weeks.     You need to limit yourself to light duty activities (no lifting/pulling/pushing >10 lbs) for a total of 6 weeks after surgery.    Stay on full liquid diet for 1 week. Afterwards, if tolerating, transition to soft diet for 1 week or until seen in clinic.     Medications:  A narcotic pain medication has been prescribed.  Please start to wean the pain medication over the following few days.  You can take tylenol instead of the pain medication.      Please take miralax 1 capful at night to prevent constipation associated with narcotic pain medications.      Follow up:  Return to clinic in 2 weeks for follow up and wound check.

## 2023-03-24 NOTE — DISCHARGE SUMMARY
[facility] - [facility]  General Surgery  Discharge Summary      Patient Name: Yamel Shah  MRN: [id number]  Admission Date: 3/23/2023  Hospital Length of Stay: 1 days  Discharge Date and Time:  03/24/2023 12:57 PM  Attending Physician: Katherine Segura*   Discharging Provider: Fox Heredia MD  Primary Care Provider: Aly Rand MD    HPI:   No notes on file    Procedure(s) (LRB):  XI ROBOTIC REPAIR, HERNIA, HIATAL (N/A)  XI ROBOTIC PYLOROMYOTOMY (N/A)  REPAIR, HERNIA, UMBILICAL (N/A)  XI ROBOTIC GASTROPEXY (N/A)  XI ROBOTIC EXCISION, LESION, STOMACH (N/A)      Indwelling Lines/Drains at time of discharge:   Lines/Drains/Airways     None               Hospital Course: Please see the preoperative H&P and other available documentation for full details related to history prior to this admission.  Briefly, Yamel Shah is a 72 y.o. female who was admitted following scheduled elective surgery for Hiatal hernia. They underwent the following procedures: robotic hiatal hernia repair and pyloroplasty    Following a complete preoperative discussion of the risks and benefits of surgery with signed informed consent, the patient was taken to the operating room on 3/23/2023 and underwent the above stated procedures. The patient tolerated surgery well and there were no complications. Please see the operative report for full intraoperative findings and details. Postoperatively, the patient did well and was transferred from the PACU to the floor in stable condition where they had a stable and uncomplicated hospital course. Labs and vital signs remained stable and appropriate throughout course. Diet was advanced as tolerated and the patient's pain was controlled on oral pain medications without problem. Ambulating without issue. Voiding without issue with adequate urine output. Passing gas and stool. Incision site is clean, dry, and intact.    Currently, the patient is doing well at 1 Day Post-Op  and is stable and appropriate for discharge home at this time. Patient will follow up in clinic in 2 weeks.        Goals of Care Treatment Preferences:  Code Status: Full Code      Consults:   Consults (From admission, onward)        Status Ordering Provider     Inpatient consult to Registered Dietitian/Nutritionist  Once        Provider:  (Not yet assigned)    Completed CHERYLE RODRIGUES          Significant Diagnostic Studies: Labs:   CMP   Recent Labs   Lab 03/24/23  0337      K 3.9      CO2 23   GLU 92   BUN 10   CREATININE 0.6   CALCIUM 8.8   ANIONGAP 8   , CBC   Recent Labs   Lab 03/24/23  0337   WBC 4.91   HGB 11.3*   HCT 35.2*      , Lipid Panel   Lab Results   Component Value Date    CHOL 109 (L) 10/07/2022    HDL 43 10/07/2022    LDLCALC 54.4 (L) 10/07/2022    TRIG 58 10/07/2022    CHOLHDL 39.4 10/07/2022   , A1C:   Recent Labs   Lab 10/07/22  0738   HGBA1C 5.2    and All labs within the past 24 hours have been reviewed  Radiology: X-Ray: CXR: X-Ray Chest 1 View (CXR): No results found for this visit on 03/23/23. and X-Ray Chest PA and Lateral (CXR): No results found for this visit on 03/23/23. and KUB: X-Ray Abdomen AP 1 View (KUB): No results found for this visit on 03/23/23.  CT scan: CT ABDOMEN PELVIS WITH CONTRAST: No results found for this visit on 03/23/23. and CT ABDOMEN PELVIS WITHOUT CONTRAST: No results found for this visit on 03/23/23.    Pending Diagnostic Studies:     Procedure Component Value Units Date/Time    Specimen to Pathology, Surgery General Surgery [[id number]] Collected: 03/23/23 1407    Order Status: Sent Lab Status: In process Updated: 03/24/23 1119    Specimen: Tissue         Final Active Diagnoses:    Diagnosis Date Noted POA    PRINCIPAL PROBLEM:  Hiatal hernia [K44.9] 03/24/2023 Yes      Problems Resolved During this Admission:      Discharged Condition: fair    Disposition: Home or Self Care    Follow Up:   Follow-up Information     Katherine Segura,  "MD. Schedule an appointment as soon as possible for a visit in 2 week(s).    Specialties: General Surgery, Bariatrics  Contact information:  [address]  [facility] [address]  [phone]                       Patient Instructions:      Lifting restrictions   Order Comments: No heavy lifting greater than 10 pounds (about the weight of a gallon of milk) for 6 week postoperatively. This is to prevent new/recurrent hernia at your incision sites while they heal.     No driving until:   Order Comments: While taking narcotic pain medications.     Notify your health care provider if you experience any of the following:  temperature >100.4     Notify your health care provider if you experience any of the following:  persistent nausea and vomiting or diarrhea     Notify your health care provider if you experience any of the following:  severe uncontrolled pain     Notify your health care provider if you experience any of the following:  redness, tenderness, or signs of infection (pain, swelling, redness, odor or green/yellow discharge around incision site)     Notify your health care provider if you experience any of the following:  difficulty breathing or increased cough     Notify your health care provider if you experience any of the following:  severe persistent headache     Notify your health care provider if you experience any of the following:  worsening rash     Notify your health care provider if you experience any of the following:  persistent dizziness, light-headedness, or visual disturbances     Notify your health care provider if you experience any of the following:  increased confusion or weakness     Shower on day dressing removed (No bath)   Order Comments: You may SHOWER 24 hours after surgery. If you have gauze/tape dressings in place, you should remove them prior to showering. If you have Dermabond (skin glue) or white "Steri strips" in place, these will eventually peel off on their own after 1-2 " weeks. NO SUBMERSION of your incisions (bath, pool, hot tub, etc) until your postop appointment. This allows your incisions to heal and to avoid a wound infection.     Medications:  Reconciled Home Medications:      Medication List      START taking these medications    gabapentin 300 mg/6 mL (6 mL) Soln  Take 6 mLs (300 mg total) by mouth every 8 (eight) hours. for 10 days     methocarbamoL 500 MG Tab  Commonly known as: ROBAXIN  Take 1 tablet (500 mg total) by mouth 4 (four) times daily. Crushed for 10 days        CHANGE how you take these medications    * NIFEdipine 90 MG Tbsr  Commonly known as: ADALAT CC  TAKE 1 TABLET(90 MG) BY MOUTH EVERY DAY  What changed:   · how much to take  · when to take this  · additional instructions     * NIFEdipine 30 MG (OSM) 24 hr tablet  Commonly known as: PROCARDIA-XL  TAKE 1 TABLET(30 MG) BY MOUTH EVERY DAY  What changed:   · how much to take  · when to take this  · additional instructions     RABEprazole 20 mg tablet  Commonly known as: ACIPHEX  TAKE 1 TABLET BY MOUTH TWICE DAILY  What changed:   · how much to take  · when to take this     tadalafil 20 mg Tab  Commonly known as: ADCIRCA  Take 2 tablets (40 mg total) by mouth once daily.  What changed: when to take this         * This list has 2 medication(s) that are the same as other medications prescribed for you. Read the directions carefully, and ask your doctor or other care provider to review them with you.            CONTINUE taking these medications    acetaminophen-codeine 300-30mg 300-30 mg Tab  Commonly known as: TYLENOL #3  Take by mouth nightly.     albuterol 90 mcg/actuation inhaler  Commonly known as: VENTOLIN HFA  Inhale 2 puffs into the lungs every 4 (four) hours as needed for Wheezing. Rescue     carboxymethylcellulose sodium 0.5 % Drop  Commonly known as: REFRESH TEARS  Apply 1-2 drops to every as needed     ergocalciferol 50,000 unit Cap  Commonly known as: ERGOCALCIFEROL  Take 1 capsule (50,000 Units  total) by mouth every 7 days.     ferrous sulfate 325 (65 FE) MG EC tablet  Take 325 mg by mouth every morning.     FLUAD QUAD 2022-23(65Y UP)(PF) 60 mcg (15 mcg x 4)/0.5 mL Syrg  Generic drug: flu vac 2022 65up-qtvZM54C(PF)  Inject into the muscle.     multivitamin capsule  Take 1 capsule by mouth once daily.     mycophenolate mofetil 200 mg/mL Susr  Commonly known as: CELLCEPT  Take 5 mLs (1,000 mg total) by mouth 2 (two) times daily.     pravastatin 20 MG tablet  Commonly known as: PRAVACHOL  TAKE 1 TABLET(20 MG) BY MOUTH EVERY EVENING     PREVIDENT 5000 BOOSTER PLUS 1.1 % Pste  Generic drug: fluoride (sodium)  SMARTSIG:To Teeth     PREVIDENT 5000 SENSITIVE 1.1-5 % Pste  Generic drug: sodium fluoride-pot nitrate  BRUSH FOR 2 MINUTES TWICE PER DAY     sars-cov-2 (covid-19) 50 mcg/0.25 ml injection (BOOSTER)  Commonly known as: MODERNA COVID-19  Inject into the muscle.     tiZANidine 4 MG tablet  Commonly known as: ZANAFLEX  Take 1 tablet (4 mg total) by mouth nightly as needed (muscle pain).        ASK your doctor about these medications    nabumetone 750 MG tablet  Commonly known as: RELAFEN  Take 1 tablet (750 mg total) by mouth 2 (two) times daily as needed for Pain.     pregabalin 300 MG Cap  Commonly known as: LYRICA  Take 1 capsule (300 mg total) by mouth 2 (two) times daily.          Time spent on the discharge of patient: 20 minutes    Fox Heredia MD  General Surgery  [facility] - Surgery

## 2023-03-24 NOTE — PLAN OF CARE
[facility] - [facility]  Discharge Final Note    Primary Care Provider: Aly Rand MD    Expected Discharge Date: 3/24/2023    Final Discharge Note (most recent)       Final Note - 03/24/23 1503          Final Note    Assessment Type Final Discharge Note     Anticipated Discharge Disposition Home or Self Care     What phone number can be called within the next 1-3 days to see how you are doing after discharge? --   [phone]    Hospital Resources/Appts/Education Provided Appointments scheduled and added to AVS                     Important Message from Medicare             Contact Info       Katherine Segura MD   Specialty: General Surgery, Bariatrics    [address]  [facility] [address]   Phone: [phone]       Next Steps: Schedule an appointment as soon as possible for a visit in 2 week(s)            Future Appointments   Date Time Provider Department Center   4/5/2023  1:45 PM Katherine Segura MD [facility] GENSUR [facility]   4/6/2023  9:40 AM SIX, MINUTE WALK [facility] PUL WLK [facility]   4/6/2023 10:00 AM PULMONARY FUNCTION [facility] PULMLAB [facility]   4/6/2023 10:15 AM PULMONARY FUNCTION [facility] PULMLAB [facility]   4/6/2023 10:30 AM PULMONARY FUNCTION [facility] PULMLAB [facility]   4/6/2023 11:00 AM [facility] OIC-US1 MASTER [facility]TR [facility]   4/12/2023  8:20 AM Timmy Claudio MD [facility] VASCSUR [facility]   4/25/2023 10:30 AM Aly Rand MD [facility] IM [place]   5/2/2023 10:00 AM Yanna Bahena MD [facility] ALLIMM [facility]   6/19/2023  8:00 AM Jessica Wilson MD [facility] RHEUM [facility]     Patient discharged home to care of family on 3/24/23.      Philomena Johnson RNCM  Case Management  [facility]  [phone]

## 2023-03-24 NOTE — NURSING TRANSFER
Nursing Transfer Note      3/23/2023     Reason patient is being transferred: recovery care complete    Transfer To: [address]        Transported by TRANSPORT STAFF    Medicines sent: lr @ 75ML/HR    Any special needs or follow-up needed: ROUTINE    Chart send with patient: Yes    Notified: spouse    Patient reassessed at: 03/23/23 1840 (date, time)

## 2023-03-24 NOTE — SUBJECTIVE & OBJECTIVE
Interval History: Patient had robotic HH repair yesterday. NAEON, afebrile, HDS. Pain is well controlled. Refers her night was not that good because of headache.She was erroneously given jello cups which she took but did not have any issues with them.    Medications:  Continuous Infusions:  Scheduled Meds:   acetaminophen  650 mg Oral Q6H    gabapentin  125 mg Oral Q8H    methocarbamol (ROBAXIN) IVPB  500 mg Intravenous Q6H    NIFEdipine  120 mg Oral QHS    pantoprazole  40 mg Oral Daily    tadalafil  40 mg Oral Nightly     PRN Meds:albuterol, carboxymethylcellulose sodium     Review of patient's allergies indicates:   Allergen Reactions    Sulfa (sulfonamide antibiotics)      Other reaction(s): Rash    Tramadol Itching     Objective:     Vital Signs (Most Recent):  Temp: 97.8 °F (36.6 °C) (03/24/23 0721)  Pulse: 69 (03/24/23 0721)  Resp: 16 (03/24/23 0721)  BP: 138/65 (03/24/23 0721)  SpO2: (!) 93 % (03/24/23 0721)   Vital Signs (24h Range):  Temp:  [96.6 °F (35.9 °C)-98.2 °F (36.8 °C)] 97.8 °F (36.6 °C)  Pulse:  [66-78] 69  Resp:  [12-24] 16  SpO2:  [93 %-98 %] 93 %  BP: (119-169)/(57-74) 138/65        There is no height or weight on file to calculate BMI.    Intake/Output - Last 3 Shifts         03/22 0700  03/23 0659 03/23 0700  03/24 0659 03/24 0700 03/25 0659    IV Piggyback  1500     Total Intake  1500     Urine  3650     Total Output  3650     Net  -2150                    Physical Exam  Constitutional:       General: She is not in acute distress.     Appearance: Normal appearance.   HENT:      Mouth/Throat:      Mouth: Mucous membranes are moist.   Eyes:      Extraocular Movements: Extraocular movements intact.   Cardiovascular:      Rate and Rhythm: Normal rate and regular rhythm.   Pulmonary:      Effort: Pulmonary effort is normal. No respiratory distress.      Breath sounds: Normal breath sounds.   Abdominal:      General: There is no distension.      Palpations: Abdomen is soft.      Tenderness: There  is abdominal tenderness. There is no guarding.      Comments: Incisions c/d/I  Appropriately TTP   Skin:     General: Skin is warm.   Neurological:      Mental Status: She is alert and oriented to person, place, and time. Mental status is at baseline.       Significant Labs:  I have reviewed all pertinent lab results within the past 24 hours.  CBC:   Recent Labs   Lab 03/24/23  0337   WBC 4.91   RBC 3.68*   HGB 11.3*   HCT 35.2*      MCV 96   MCH 30.7   MCHC 32.1     CMP:   Recent Labs   Lab 03/24/23  0337   GLU 92   CALCIUM 8.8      K 3.9   CO2 23      BUN 10   CREATININE 0.6       Significant Diagnostics:  I have reviewed all pertinent imaging results/findings within the past 24 hours.

## 2023-03-27 ENCOUNTER — TELEPHONE (OUTPATIENT)
Dept: SURGERY | Facility: CLINIC | Age: 73
End: 2023-03-27
Payer: MEDICARE

## 2023-03-27 ENCOUNTER — PATIENT OUTREACH (OUTPATIENT)
Dept: ADMINISTRATIVE | Facility: CLINIC | Age: 73
End: 2023-03-27
Payer: MEDICARE

## 2023-03-27 ENCOUNTER — TELEPHONE (OUTPATIENT)
Dept: SURGERY | Facility: HOSPITAL | Age: 73
End: 2023-03-27
Payer: MEDICARE

## 2023-03-27 RX ORDER — ACETAMINOPHEN AND CODEINE PHOSPHATE 300; 30 MG/1; MG/1
1 TABLET ORAL EVERY 6 HOURS PRN
Qty: 20 TABLET | Refills: 0 | Status: SHIPPED | OUTPATIENT
Start: 2023-03-27 | End: 2023-04-25 | Stop reason: SDUPTHER

## 2023-03-27 NOTE — ANESTHESIA POSTPROCEDURE EVALUATION
Anesthesia Post Evaluation    Patient: Yamel Shah    Procedure(s) Performed: Procedure(s) (LRB):  XI ROBOTIC REPAIR, HERNIA, HIATAL (N/A)  XI ROBOTIC PYLOROMYOTOMY (N/A)  REPAIR, HERNIA, UMBILICAL (N/A)  XI ROBOTIC GASTROPEXY (N/A)  XI ROBOTIC EXCISION, LESION, STOMACH (N/A)    Final Anesthesia Type: general      Patient location during evaluation: PACU  Patient participation: Yes- Able to Participate  Level of consciousness: awake  Post-procedure vital signs: reviewed and stable  Pain management: adequate  Airway patency: patent    PONV status at discharge: No PONV  Anesthetic complications: no      Cardiovascular status: blood pressure returned to baseline  Respiratory status: unassisted  Hydration status: euvolemic  Follow-up not needed.          Vitals Value Taken Time   /74 03/24/23 1153   Temp 36.4 °C (97.5 °F) 03/24/23 1153   Pulse 70 03/24/23 1153   Resp 18 03/24/23 1153   SpO2 95 % 03/24/23 1153         Event Time   Out of Recovery 15:00:00         Pain/Lory Score: No data recorded

## 2023-03-27 NOTE — TELEPHONE ENCOUNTER
----- Message from Jeannette Brito sent at 3/27/2023 12:22 PM CDT -----  Regarding: question  Contact: @  [phone]  Pt  is calling  in regards to his wife recent surgery ....Please call and adv @  [phone]

## 2023-03-27 NOTE — TELEPHONE ENCOUNTER
----- Message from Roger Waldrop MA sent at 3/27/2023 12:20 PM CDT -----  Contact: [phone]  pt's  Lewis requesting to speak with Astrid, and returning phone call. Please reach out to pt's  at [phone]       I called and left a phone message.  I apologized for the delay in returning their phone call.  I see that my coworker had called the Patient's  that they he had just spoken to Dr. Calabrese and had his questions answered.  I left the Office phone number for any additional questions.

## 2023-03-27 NOTE — TELEPHONE ENCOUNTER
Returned pt husbands phone call to discuss their post op concerns.  They just got off of the phone with Dr. Calabrese and seem to have all of their questions answered.  They have no further questions at this time, but will call the clinic if needed.

## 2023-03-30 LAB
FINAL PATHOLOGIC DIAGNOSIS: NORMAL
GROSS: NORMAL
Lab: NORMAL
MICROSCOPIC EXAM: NORMAL

## 2023-03-31 ENCOUNTER — TELEPHONE (OUTPATIENT)
Dept: TRANSPLANT | Facility: CLINIC | Age: 73
End: 2023-03-31
Payer: MEDICARE

## 2023-03-31 ENCOUNTER — TELEPHONE (OUTPATIENT)
Dept: VASCULAR SURGERY | Facility: CLINIC | Age: 73
End: 2023-03-31
Payer: MEDICARE

## 2023-03-31 NOTE — TELEPHONE ENCOUNTER
F/U with Mrs. Shah. She reports baseline symptoms. Recently had hernia surgery but recovering well. Has testing scheduled for May, included 6MWT.   Reviewed RHC results which show no PH while on adcirca 40 mg, once daily. Continue current regimen. Follow with pulm and rheum as scheduled.     Answered all questions and concerns.

## 2023-03-31 NOTE — TELEPHONE ENCOUNTER
"Spoke with the pt and informed her that the provider is out of the clinic on 4/12/23 and appt needs to be rescheduled.Attempted to schedule pt with a sooner appt but pt verbalized that she will be out of town and 5/10/23 is a "dark day"appointment scheduled and confirmed per pt request.  "

## 2023-04-03 NOTE — PHYSICIAN QUERY
PT Name: Yamel Shah  MR #: [id number]    DOCUMENTATION CLARIFICATION     CDS/: Mckenna Villarreal RN, CDS               Contact information: [email or web address]  This form is a permanent document in the medical record.     Query Date: April 3, 2023    By submitting this query, we are merely seeking further clarification of documentation.  Please utilize your independent clinical judgment when addressing the question(s) below.    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    Form No. [id number]

## 2023-04-05 ENCOUNTER — OFFICE VISIT (OUTPATIENT)
Dept: SURGERY | Facility: CLINIC | Age: 73
End: 2023-04-05
Payer: MEDICARE

## 2023-04-05 VITALS
SYSTOLIC BLOOD PRESSURE: 138 MMHG | BODY MASS INDEX: 25.51 KG/M2 | WEIGHT: 153.13 LBS | HEART RATE: 60 BPM | DIASTOLIC BLOOD PRESSURE: 65 MMHG | HEIGHT: 65 IN

## 2023-04-05 DIAGNOSIS — G89.4 CHRONIC PAIN DISORDER: ICD-10-CM

## 2023-04-05 DIAGNOSIS — J84.9 ILD (INTERSTITIAL LUNG DISEASE): ICD-10-CM

## 2023-04-05 DIAGNOSIS — I73.00 RAYNAUD'S DISEASE WITHOUT GANGRENE: ICD-10-CM

## 2023-04-05 DIAGNOSIS — K44.9 HIATAL HERNIA WITH GERD AND ESOPHAGITIS: ICD-10-CM

## 2023-04-05 DIAGNOSIS — D21.4 BENIGN GASTROINTESTINAL STROMAL TUMOR (GIST): ICD-10-CM

## 2023-04-05 DIAGNOSIS — I27.29 PULMONARY HYPERTENSION ASSOCIATED WITH SYSTEMIC DISORDER: ICD-10-CM

## 2023-04-05 DIAGNOSIS — K21.00 HIATAL HERNIA WITH GERD AND ESOPHAGITIS: ICD-10-CM

## 2023-04-05 DIAGNOSIS — M34.1 SCLERODERMA OF ESOPHAGUS: Primary | ICD-10-CM

## 2023-04-05 DIAGNOSIS — I10 ESSENTIAL HYPERTENSION: ICD-10-CM

## 2023-04-05 PROCEDURE — 99214 OFFICE O/P EST MOD 30 MIN: CPT | Mod: PBBFAC,NTX | Performed by: SURGERY

## 2023-04-05 PROCEDURE — 99024 PR POST-OP FOLLOW-UP VISIT: ICD-10-PCS | Mod: NTX,POP,, | Performed by: SURGERY

## 2023-04-05 PROCEDURE — 99024 POSTOP FOLLOW-UP VISIT: CPT | Mod: NTX,POP,, | Performed by: SURGERY

## 2023-04-05 PROCEDURE — 99999 PR PBB SHADOW E&M-EST. PATIENT-LVL IV: ICD-10-PCS | Mod: PBBFAC,TXP,, | Performed by: SURGERY

## 2023-04-05 PROCEDURE — 99999 PR PBB SHADOW E&M-EST. PATIENT-LVL IV: CPT | Mod: PBBFAC,TXP,, | Performed by: SURGERY

## 2023-04-05 NOTE — PROGRESS NOTES
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 She has had symptoms of heartburn, dysphagia, and nocturnal regurgitation for the last 30 years, which have progressively worsened over the last two despite taking Aciphex. GES 5/4/20 demonstrated 25% retention at 4 hours. GP symptoms are well controlled with dietary modifications. Denies nausea, vomiting, gas bloat, or abdominal pain. Rare dysphagia. Rare early satiety. Weight is stable.     Work-up includes:  HREM 3/4/20: Low LES pressure with complete relaxation, absent contractility, hiatal hernia  pH impedence 3/6/30: [place] 133  GES 5/4/20: Retention of 25% at 4 hours  EGD 7/1/22: LA grade A esophagitis, nondysplastic Pagan's, benign-appearing stenosis, 2-cm hiatal hernia.     Interval History 3/22/23:  Patient is a 72 year-old woman who returns for pre-op discussion. Her symptoms and overall health are unchanged. Her case was presented at [facility] Conference 11/8/22 at which time concomitant pyloromyotomy and hiatal hernia repair without fundoplication was recommended.    Interval History 4/5/23:  Patient here for follow up following laparoscopic hiatal hernia repair with pyloromyotomy performed on 3/23/23. Patient doing well since surgery. Post-op pain has resolved. Tolerating soft diet - occasionally feels like food gets stuck when swallowing. Denies any regurgitation, nausea/vomiting, or reflux. Denies fever or chills.     No chief complaint on file.    Review of patient's allergies indicates:   Allergen Reactions    Sulfa (sulfonamide antibiotics)      Other reaction(s): Rash    Tramadol Itching     Current Outpatient Medications   Medication Sig Dispense  Refill    acetaminophen-codeine 300-30mg (TYLENOL #3) 300-30 mg Tab Take 1 tablet by mouth every 6 (six) hours as needed. 20 tablet 0    albuterol (VENTOLIN HFA) 90 mcg/actuation inhaler Inhale 2 puffs into the lungs every 4 (four) hours as needed for Wheezing. Rescue 18 g 3    carboxymethylcellulose sodium (REFRESH TEARS) 0.5 % Drop Apply 1-2 drops to every as needed      ergocalciferol (ERGOCALCIFEROL) 50,000 unit Cap Take 1 capsule (50,000 Units total) by mouth every 7 days. 12 capsule 1    ferrous sulfate 325 (65 FE) MG EC tablet Take 325 mg by mouth every morning.      flu vac 2022 [id number], (FLUAD QUAD 2022-23,65Y UP,,PF,) 60 mcg (15 mcg x 4)/0.5 mL Syrg Inject into the muscle. 0.5 mL 0    multivitamin capsule Take 1 capsule by mouth once daily.      mycophenolate mofetil (CELLCEPT) 200 mg/mL SusR Take 5 mLs (1,000 mg total) by mouth 2 (two) times daily. 480 mL 1    nabumetone (RELAFEN) 750 MG tablet Take 1 tablet (750 mg total) by mouth 2 (two) times daily as needed for Pain. (Patient taking differently: Take 750 mg by mouth every evening.) 60 tablet 3    NIFEdipine (ADALAT CC) 90 MG TbSR TAKE 1 TABLET(90 MG) BY MOUTH EVERY DAY (Patient taking differently: Take by mouth nightly. TAKE 1 TABLET(90 MG) BY MOUTH EVERY DAY (TAKES NIGHTLY WITH 30 MG TABLET TOTAL 120 MG)) 90 tablet 3    NIFEdipine (PROCARDIA-XL) 30 MG (OSM) 24 hr tablet TAKE 1 TABLET(30 MG) BY MOUTH EVERY DAY (Patient taking differently: Take by mouth nightly. TAKE WITH 90 MG TABLET EVERY NIGHT (TOTAL 120 MG)) 30 tablet 11    pravastatin (PRAVACHOL) 20 MG tablet TAKE 1 TABLET(20 MG) BY MOUTH EVERY EVENING 90 tablet 2    pregabalin (LYRICA) 300 MG Cap Take 1 capsule (300 mg total) by mouth 2 (two) times daily. (Patient taking differently: Take 300 mg by mouth every evening.) 60 capsule 6    PREVIDENT 5000 BOOSTER PLUS 1.1 % Pste SMARTSIG:To Teeth      PREVIDENT 5000 SENSITIVE 1.1-5 % Pste BRUSH FOR 2 MINUTES TWICE PER DAY      RABEprazole  (ACIPHEX) 20 mg tablet TAKE 1 TABLET BY MOUTH TWICE DAILY (Patient taking differently: Take by mouth every morning.) 60 tablet 11    sars-cov-2, covid-19, (MODERNA COVID-19) 50 mcg/0.25 ml injection (BOOSTER) Inject into the muscle. 0.25 mL 0    tadalafil (ADCIRCA) 20 mg Tab Take 2 tablets (40 mg total) by mouth once daily. (Patient taking differently: Take 40 mg by mouth nightly.) 28 tablet 11    tiZANidine (ZANAFLEX) 4 MG tablet Take 1 tablet (4 mg total) by mouth nightly as needed (muscle pain). 30 tablet 3     No current facility-administered medications for this visit.     Past Medical History:   Diagnosis Date    Abnormal Pap smear     Acid reflux     Allergy     Arthritis     Encounter for blood transfusion     GERD (gastroesophageal reflux disease)     History of migraine headaches     Hx of colonic polyp     Hypertension     Idiopathic neuropathy 7/20/2012    ILD (interstitial lung disease) 11/6/2013    Iron deficiency anemia 3/18/2014    MRSA carrier     Osteopenia     Pneumonia     Pulmonary fibrosis     Pulmonary hypertension     Raynaud's disease     Scleroderma, diffuse     Sjogren's syndrome     Vitamin D deficiency 11/14/2013     Past Surgical History:   Procedure Laterality Date    24 HOUR IMPEDANCE PH MONITORING OF ESOPHAGUS IN PATIENT NOT TAKING ACID REDUCING MEDICATIONS N/A 3/4/2020    Procedure: IMPEDANCE PH STUDY, ESOPHAGEAL, 24 HOUR, IN PATIENT NOT TAKING ACID REDUCING MEDICATION;  Surgeon: Annamaria Mendoza MD;  Location: [facility] ([address] [place] [address]);  Service: Endoscopy;  Laterality: N/A;  OFF PPI/H2 Blocker   Motility Studies   Hold Narcotics x 1 days   Hold TCA x 1 days  2/26 - LVM attempting to confirm appt  2/27 - Confirmed appt    ANORECTAL MANOMETRY N/A 5/10/2021    Procedure: MANOMETRY, ANORECTAL with balloon expulsion test;  Surgeon: Annamaria Mendoza MD;  Location: [facility] (4TH FLR);  Service: Endoscopy;  Laterality: N/A;  order combined  covid test 5/7 [employer], instructions emailed-[facility]     BREAST BIOPSY      Left, benign    CERVICAL CONIZATION   W/ LASER  1970    COLONOSCOPY      COLONOSCOPY N/A 3/29/2019    Procedure: COLONOSCOPY;  Surgeon: Annamaria Mendoza MD;  Location: [facility] (2ND FLR);  Service: Endoscopy;  Laterality: N/A;    COLONOSCOPY N/A 5/10/2021    Procedure: COLONOSCOPY;  Surgeon: Annamaria Mendoza MD;  Location: [facility] (4TH FLR);  Service: Endoscopy;  Laterality: N/A;  2nd floor-previous scopes done on 2nd floor, gastroparesis  full liquid diet x2 days, clear liquid x1 day prior to procedure  covid test 5/7 [facility], instructions emailed-[facility]  5/6 pt confirmed appt-[facility]    DILATION AND CURETTAGE OF UTERUS      ESOPHAGEAL MANOMETRY WITH MEASUREMENT OF IMPEDANCE N/A 3/4/2020    Procedure: MANOMETRY, ESOPHAGUS, WITH IMPEDANCE MEASUREMENT;  Surgeon: Annamaria Mendoza MD;  Location: [facility] (4TH FLR);  Service: Endoscopy;  Laterality: N/A;  OFF PPI/H2 Blocker   Motility Studies   Hold Narcotics x 1 days   Hold TCA x 1 days    ESOPHAGOGASTRODUODENOSCOPY      ESOPHAGOGASTRODUODENOSCOPY N/A 3/29/2019    Procedure: EGD (ESOPHAGOGASTRODUODENOSCOPY);  Surgeon: Annamaria Mendoza MD;  Location: [facility] (2ND FLR);  Service: Endoscopy;  Laterality: N/A;  pulmonary htn    ESOPHAGOGASTRODUODENOSCOPY N/A 2/2/2021    Procedure: ESOPHAGOGASTRODUODENOSCOPY (EGD);  Surgeon: Annamaria Mendoza MD;  Location: [facility] (2ND FLR);  Service: Endoscopy;  Laterality: N/A;  2nd floor gastroparesis  3 days full liquid diet and 1 day clears  covid test 1/30 primary care, instructions sent to [facility]    ESOPHAGOGASTRODUODENOSCOPY N/A 7/1/2022    Procedure: ESOPHAGOGASTRODUODENOSCOPY (EGD);  Surgeon: Annamaria Mendoza MD;  Location: [facility] (2ND FLR);  Service: Endoscopy;  Laterality: N/A;  2nd floor-gastroparesis  full liquid diet x3 days, clear liquid diet x1 day prior to procedure  fully vaccinated, instructions sent to [facility]  6/29-pt confirmed new arrival time-[facility]    EXCISION, LESION, STOMACH,  LAPAROSCOPIC N/A 3/23/2023    Procedure: XI ROBOTIC EXCISION, LESION, STOMACH;  Surgeon: Katherine Segura MD;  Location: [facility] OR [address] [place] [address];  Service: General;  Laterality: N/A;    HYSTERECTOMY  1990    FRANDY (AUB, Fibroids), ovaries remain    REPAIR, HERNIA, UMBILICAL N/A 3/23/2023    Procedure: REPAIR, HERNIA, UMBILICAL;  Surgeon: Katherine Segura MD;  [facility];  Service: General;  Laterality: N/A;    RIGHT HEART CATHETERIZATION Right 1/5/2021    Procedure: INSERTION, CATHETER, RIGHT HEART;  Surgeon: Aureliano Sy MD;  Location: [facility] CATH LAB;  Service: Cardiology;  Laterality: Right;    RIGHT HEART CATHETERIZATION Right 3/16/2023    Procedure: INSERTION, CATHETER, RIGHT HEART;  Surgeon: Aureliano Sy MD;  Location: [facility] CATH LAB;  Service: Cardiology;  Laterality: Right;    ROBOT-ASSISTED REPAIR OF HIATAL HERNIA USING DA VERNELL XI N/A 3/23/2023    Procedure: XI ROBOTIC REPAIR, HERNIA, HIATAL;  Surgeon: Katherine Segura MD;  Location: [address];  Service: General;  Laterality: N/A;    VARICOSE VEIN SURGERY      XI ROBOTIC GASTROPEXY N/A 3/23/2023    Procedure: XI ROBOTIC GASTROPEXY;  Surgeon: Katherine Segura MD;  Location: [address];  Service: General;  Laterality: N/A;    XI ROBOTIC PYLOROMYOTOMY N/A 3/23/2023    Procedure: XI ROBOTIC PYLOROMYOTOMY;  Surgeon: Katherine Segura MD;  Location: [facility] OR [address] [place] [address];  Service: General;  Laterality: N/A;     Family History   Problem Relation Age of Onset    Breast cancer Mother     Hypertension Father     Breast cancer Sister     Diabetes Sister     Osteoarthritis Brother     Diabetes Brother     No Known Problems Daughter     No Known Problems Daughter     No Known Problems Son     No Known Problems Son     Breast cancer Maternal Aunt     Melanoma Neg Hx     Colon cancer Neg Hx     Crohn's disease Neg Hx     Stomach cancer Neg Hx     Ulcerative colitis Neg Hx     Rectal cancer Neg Hx      "Irritable bowel syndrome Neg Hx     Esophageal cancer Neg Hx     Celiac disease Neg Hx     Ovarian cancer Neg Hx     Liver cancer Neg Hx     Pancreatic cancer Neg Hx      Social History     Tobacco Use    Smoking status: Never    Smokeless tobacco: Never   Substance Use Topics    Alcohol use: Yes     Comment: wine occasionally    Drug use: No        OBJECTIVE:     Vital Signs (Most Recent)  Pulse: 60 (04/05/23 1333)  BP: 138/65 (04/05/23 1333)  5' 5" (1.651 m)  69.4 kg (153 lb 1.8 oz)     Physical Exam:  Physical Exam  Vitals reviewed.   Constitutional:       General: She is not in acute distress.     Appearance: Normal appearance. She is well-developed. She is not ill-appearing.   HENT:      Head: Normocephalic and atraumatic.   Eyes:      General: No scleral icterus.     Conjunctiva/sclera: Conjunctivae normal.   Cardiovascular:      Rate and Rhythm: Normal rate and regular rhythm.   Pulmonary:      Effort: Pulmonary effort is normal. No respiratory distress.   Abdominal:      General: There is no distension.      Palpations: Abdomen is soft.      Tenderness: There is no abdominal tenderness. There is no guarding.      Comments: Incisions c/d/i   Musculoskeletal:         General: Deformity present.      Cervical back: Normal range of motion and neck supple.   Skin:     General: Skin is warm and dry.   Neurological:      General: No focal deficit present.      Mental Status: She is alert and oriented to person, place, and time.   Psychiatric:         Mood and Affect: Mood normal.         Behavior: Behavior normal.     Laboratory  CBC: Reviewed  CMP: Reviewed    Diagnostic Results:  Upper GI: Reviewed  EGD, Bravo pH, HREM: Reviewed    ASSESSMENT/PLAN:   Patient is a 72 year-old woman with scleroderma esophagus, hiatal hernia, GERD, and gastroparesis. She is now s/p laparoscopic hiatal hernia repair with pyloroplasty performed on 3/23/23 and recovering well from surgery. Of note, a small 0.5cm nodule was noted " intra-op on the anterior wall of the stomach, along the lesser curve. This was resected at the time of her surgery and sent for pathology and was found to be a small GIST.     - Will have patient follow up with Medical Oncology given GIST tumor found at surgery.   - Follow up with General Surgery as needed.    Radha Dominguez  4/5/2023

## 2023-04-06 ENCOUNTER — HOSPITAL ENCOUNTER (OUTPATIENT)
Dept: RADIOLOGY | Facility: HOSPITAL | Age: 73
Discharge: HOME | End: 2023-04-06
Attending: STUDENT IN AN ORGANIZED HEALTH CARE EDUCATION/TRAINING PROGRAM
Payer: MEDICARE

## 2023-04-06 DIAGNOSIS — M79.89 OTHER SPECIFIED SOFT TISSUE DISORDERS: ICD-10-CM

## 2023-04-06 DIAGNOSIS — M34.9 SCLERODERMA: ICD-10-CM

## 2023-04-06 DIAGNOSIS — R19.09 RIGHT GROIN MASS: ICD-10-CM

## 2023-04-06 PROCEDURE — 76882 US SOFT TISSUE, GROIN RIGHT: ICD-10-PCS | Mod: 26,RT,NTX, | Performed by: STUDENT IN AN ORGANIZED HEALTH CARE EDUCATION/TRAINING PROGRAM

## 2023-04-06 PROCEDURE — 76882 US LMTD JT/FCL EVL NVASC XTR: CPT | Mod: TC,RT,TXP

## 2023-04-06 PROCEDURE — 76882 US LMTD JT/FCL EVL NVASC XTR: CPT | Mod: 26,RT,NTX, | Performed by: STUDENT IN AN ORGANIZED HEALTH CARE EDUCATION/TRAINING PROGRAM

## 2023-04-09 ENCOUNTER — TELEPHONE (OUTPATIENT)
Dept: RHEUMATOLOGY | Facility: CLINIC | Age: 73
End: 2023-04-09
Payer: MEDICARE

## 2023-04-09 DIAGNOSIS — K40.20 BILATERAL INGUINAL HERNIA WITHOUT OBSTRUCTION OR GANGRENE, RECURRENCE NOT SPECIFIED: Primary | ICD-10-CM

## 2023-04-09 DIAGNOSIS — R59.0 INGUINAL ADENOPATHY: ICD-10-CM

## 2023-04-09 RX ORDER — MYCOPHENOLATE MOFETIL 200 MG/ML
1000 POWDER, FOR SUSPENSION ORAL 2 TIMES DAILY
Qty: 480 ML | Refills: 1 | Status: SHIPPED | OUTPATIENT
Start: 2023-04-09 | End: 2023-10-03 | Stop reason: SDUPTHER

## 2023-04-09 NOTE — TELEPHONE ENCOUNTER
Please schedule appt with Gen Surgery for bilateral inguinal herniae and inguinal adenopathy requiring biopsy. Thank you AWA

## 2023-04-12 ENCOUNTER — SPECIALTY PHARMACY (OUTPATIENT)
Dept: PHARMACY | Facility: CLINIC | Age: 73
End: 2023-04-12
Payer: MEDICARE

## 2023-04-12 ENCOUNTER — PATIENT MESSAGE (OUTPATIENT)
Dept: RHEUMATOLOGY | Facility: CLINIC | Age: 73
End: 2023-04-12
Payer: MEDICARE

## 2023-04-12 DIAGNOSIS — M34.9 SCLERODERMA: Primary | ICD-10-CM

## 2023-04-12 NOTE — TELEPHONE ENCOUNTER
Jad, this is Anay Loving with [facility].  We are working on your prescription that your doctor has sent us. We will be working with your insurance to get this approved for you. We will be calling you along the way with updates on your medication.  If you have any questions, you can reach us at [phone].    Welcome call outcome: Patient/caregiver reached

## 2023-04-12 NOTE — TELEPHONE ENCOUNTER
Specialty Pharmacy - Initial Clinical Assessment    Specialty Medication Orders Linked to Encounter      Flowsheet Row Most Recent Value   Medication #1 mycophenolate mofetil (CELLCEPT) 200 mg/mL SusR (Order#[id number], Rx#[id number])          Patient Diagnosis   M34.9 - Scleroderma    Subjective    Yamel Shah is a 72 y.o. female, who is followed by the specialty pharmacy service for management and education.    Recent Encounters       Date Type Provider Description    04/12/2023 Specialty Pharmacy Anay Loving, Patrick Initial Clinical Assessment    04/12/2023 Specialty Pharmacy Anay Loving, Patrick Referral Authorization    01/26/2023 Specialty Pharmacy Nadine Mulligan, Patrick Refill Coordination    11/21/2022 Specialty Pharmacy Mitra Gaytan, Patrick Refill Coordination    08/26/2022 Specialty Pharmacy Loni Camacho, Patrick Refill Coordination          Clinical call attempts since last clinical assessment   11/6/2020  4:12 PM - Specialty Pharmacy - Clinical Reassessment by Ruben Chino PharmD  11/11/2020  6:10 PM - Specialty Pharmacy - Clinical Reassessment by Ruben Chino PharmD  11/17/2020  4:55 PM - Specialty Pharmacy - Clinical Reassessment by Ruben Chino PharmD     Current Outpatient Medications   Medication Sig    albuterol (VENTOLIN HFA) 90 mcg/actuation inhaler Inhale 2 puffs into the lungs every 4 (four) hours as needed for Wheezing. Rescue    carboxymethylcellulose sodium (REFRESH TEARS) 0.5 % Drop Apply 1-2 drops to every as needed    ergocalciferol (ERGOCALCIFEROL) 50,000 unit Cap Take 1 capsule (50,000 Units total) by mouth every 7 days.    ferrous sulfate 325 (65 FE) MG EC tablet Take 325 mg by mouth every morning.    flu vac 2022 [id number],PF, (FLUAD QUAD 2022-23,65Y UP,,PF,) 60 mcg (15 mcg x 4)/0.5 mL Syrg Inject into the muscle.    multivitamin capsule Take 1 capsule by mouth once daily.    mycophenolate mofetil (CELLCEPT) 200 mg/mL SusR Take 5 mLs (1,000 mg total) by mouth 2 (two)  times daily.    nabumetone (RELAFEN) 750 MG tablet Take 1 tablet (750 mg total) by mouth 2 (two) times daily as needed for Pain. (Patient taking differently: Take 750 mg by mouth every evening.)    NIFEdipine (ADALAT CC) 90 MG TbSR TAKE 1 TABLET(90 MG) BY MOUTH EVERY DAY (Patient taking differently: Take by mouth nightly. TAKE 1 TABLET(90 MG) BY MOUTH EVERY DAY (TAKES NIGHTLY WITH 30 MG TABLET TOTAL 120 MG))    NIFEdipine (PROCARDIA-XL) 30 MG (OSM) 24 hr tablet TAKE 1 TABLET(30 MG) BY MOUTH EVERY DAY (Patient taking differently: Take by mouth nightly. TAKE WITH 90 MG TABLET EVERY NIGHT (TOTAL 120 MG))    pravastatin (PRAVACHOL) 20 MG tablet TAKE 1 TABLET(20 MG) BY MOUTH EVERY EVENING    pregabalin (LYRICA) 300 MG Cap Take 1 capsule (300 mg total) by mouth 2 (two) times daily. (Patient taking differently: Take 300 mg by mouth every evening.)    PREVIDENT 5000 BOOSTER PLUS 1.1 % Pste SMARTSIG:To Teeth    PREVIDENT 5000 SENSITIVE 1.1-5 % Pste BRUSH FOR 2 MINUTES TWICE PER DAY    RABEprazole (ACIPHEX) 20 mg tablet TAKE 1 TABLET BY MOUTH TWICE DAILY (Patient taking differently: Take by mouth every morning.)    sars-cov-2, covid-19, (MODERNA COVID-19) 50 mcg/0.25 ml injection (BOOSTER) Inject into the muscle.    tadalafil (ADCIRCA) 20 mg Tab Take 2 tablets (40 mg total) by mouth once daily. (Patient taking differently: Take 40 mg by mouth nightly.)    tiZANidine (ZANAFLEX) 4 MG tablet Take 1 tablet (4 mg total) by mouth nightly as needed (muscle pain).   Last reviewed on 4/5/2023  1:37 PM by Kaya Garrison MA    Review of patient's allergies indicates:   Allergen Reactions    Sulfa (sulfonamide antibiotics)      Other reaction(s): Rash    Tramadol Itching   Last reviewed on  4/5/2023 1:37 PM by Kaya Garrison    Drug Interactions    Drug interactions evaluated: yes  Clinically relevant drug interactions identified: no  Provided the patient with educational material regarding drug interactions: not  applicable       Medication Adherence    Support network for adherence: family member       Adverse Effects    *All other systems reviewed and are negative       Assessment Questions - Documented Responses      Flowsheet Row Most Recent Value   Assessment    Medication Reconciliation completed for patient No   During the past 4 weeks, has patient missed any activities due to condition or medication? No   During the past 4 weeks, did patient have any of the following urgent care visits? None   Goals of Therapy Status Achieving   Status of the patients ability to self-administer: Is Able   All education points have been covered with patient? No, patient declined- printed education provided   Assesment completed? Yes   Plan Therapy continued   Do you need to open a clinical intervention (i-vent)? No   Do you want to schedule first shipment? Yes   Medication #1 Assessment Info    Patient status Existing medication, Exisiting to [facility]   Is this medication appropriate for the patient? Yes   Is this medication effective? Yes          Refill Questions - Documented Responses      Flowsheet Row Most Recent Value   Refill Screening Questions    When does the patient need to receive the medication? 04/13/23   Refill Delivery Questions    How will the patient receive the medication? [facility]   When does the patient need to receive the medication? 04/13/23   Shipping Address Home   Address in [facility] confirmed and updated if neccessary? Yes   Expected Copay ($) 15   Is the patient able to afford the medication copay? Yes   Payment Method CC on file   Days supply of Refill 48   Supplies needed? No supplies needed   Refill activity completed? Yes   Refill activity plan Refill scheduled   Shipment/Pickup Date: 04/12/23            Objective    She has a past medical history of Abnormal Pap smear, Acid reflux, Allergy, Arthritis, Encounter for blood transfusion, GERD (gastroesophageal reflux disease), History of migraine  "headaches, colonic polyp, Hypertension, Idiopathic neuropathy (7/20/2012), ILD (interstitial lung disease) (11/6/2013), Iron deficiency anemia (3/18/2014), MRSA carrier, Osteopenia, Pneumonia, Pulmonary fibrosis, Pulmonary hypertension, Raynaud's disease, Scleroderma, diffuse, Sjogren's syndrome, and Vitamin D deficiency (11/14/2013).    Tried/failed medications: MMF    BP Readings from Last 4 Encounters:   04/05/23 138/65   03/24/23 130/74   03/22/23 139/65   03/16/23 127/64     Ht Readings from Last 4 Encounters:   04/05/23 5' 5" (1.651 m)   03/22/23 5' 5" (1.651 m)   03/16/23 5' 5" (1.651 m)   03/13/23 5' 5" (1.651 m)     Wt Readings from Last 4 Encounters:   04/05/23 69.4 kg (153 lb 1.8 oz)   03/22/23 71.2 kg (156 lb 13.7 oz)   03/16/23 70.3 kg (155 lb)   03/13/23 70.3 kg (155 lb)     Recent Labs   Lab Result Units 03/24/23  0337 03/16/23  1128 03/13/23  0958 02/28/23  1056   Creatinine mg/dL 0.6 0.7 0.7 0.9   ALT U/L  --  7 L 9 L 10   AST U/L  --  17 17 15     The goals of prescribed drug therapy management include:  Supporting patient to meet the prescriber's medical treatment objectives  Improving or maintaining quality of life  Maintaining optimal therapy adherence  Minimizing and managing side effects      Goals of Therapy Status: Achieving    Assessment/Plan  Patient plans to continue therapy without changes      Indication, dosage, appropriateness, effectiveness, safety and convenience of her specialty medication(s) were reviewed today.     Patient Education   Pharmacist offer to  patient was declined. Printed educational materials will be provided with medication.      Refill for pt, She went to [place] in January and [facility] transferred the medication. She is now back home. Pt declined initial consult.     Tasks added this encounter   5/20/2023 - Refill Call (Auto Added)   Tasks due within next 3 months   No tasks due.     Anay Loving, PharmD  [facility]  [address] [place]  [facility] [place] [address]  Phone: [phone]  Fax: [phone]

## 2023-04-12 NOTE — TELEPHONE ENCOUNTER
PA not required, Submit wit PA code [id number].     Test claim pays $15.00    BI: Complete MMF    RX [employer]     Deductible: $0  Max OOP: $6000  Estimated Copay: $15  [facility] is in network, non-specialty drug   PA not required.     Forwarding to initial

## 2023-04-19 ENCOUNTER — PATIENT MESSAGE (OUTPATIENT)
Dept: SURGERY | Facility: CLINIC | Age: 73
End: 2023-04-19
Payer: MEDICARE

## 2023-04-21 ENCOUNTER — PATIENT MESSAGE (OUTPATIENT)
Dept: RHEUMATOLOGY | Facility: CLINIC | Age: 73
End: 2023-04-21
Payer: MEDICARE

## 2023-04-21 ENCOUNTER — TELEPHONE (OUTPATIENT)
Dept: HEMATOLOGY/ONCOLOGY | Facility: CLINIC | Age: 73
End: 2023-04-21
Payer: MEDICARE

## 2023-04-25 ENCOUNTER — OFFICE VISIT (OUTPATIENT)
Dept: INTERNAL MEDICINE | Facility: CLINIC | Age: 73
End: 2023-04-25
Payer: MEDICARE

## 2023-04-25 VITALS
HEART RATE: 74 BPM | BODY MASS INDEX: 25.97 KG/M2 | WEIGHT: 155.88 LBS | HEIGHT: 65 IN | DIASTOLIC BLOOD PRESSURE: 62 MMHG | RESPIRATION RATE: 18 BRPM | TEMPERATURE: 98 F | OXYGEN SATURATION: 96 % | SYSTOLIC BLOOD PRESSURE: 118 MMHG

## 2023-04-25 DIAGNOSIS — K21.9 GASTROESOPHAGEAL REFLUX DISEASE, UNSPECIFIED WHETHER ESOPHAGITIS PRESENT: ICD-10-CM

## 2023-04-25 DIAGNOSIS — L97.519 SKIN ULCERS OF BOTH FEET: ICD-10-CM

## 2023-04-25 DIAGNOSIS — D50.9 IRON DEFICIENCY ANEMIA, UNSPECIFIED IRON DEFICIENCY ANEMIA TYPE: ICD-10-CM

## 2023-04-25 DIAGNOSIS — E55.9 VITAMIN D DEFICIENCY: ICD-10-CM

## 2023-04-25 DIAGNOSIS — Z12.31 ENCOUNTER FOR SCREENING MAMMOGRAM FOR BREAST CANCER: ICD-10-CM

## 2023-04-25 DIAGNOSIS — I10 ESSENTIAL HYPERTENSION: Primary | ICD-10-CM

## 2023-04-25 DIAGNOSIS — L97.529 SKIN ULCERS OF BOTH FEET: ICD-10-CM

## 2023-04-25 DIAGNOSIS — R79.9 ABNORMAL FINDING OF BLOOD CHEMISTRY, UNSPECIFIED: ICD-10-CM

## 2023-04-25 DIAGNOSIS — M34.9 SCLERODERMA WITH PULMONARY INVOLVEMENT: ICD-10-CM

## 2023-04-25 PROCEDURE — 99999 PR PBB SHADOW E&M-EST. PATIENT-LVL V: ICD-10-PCS | Mod: PBBFAC,,, | Performed by: INTERNAL MEDICINE

## 2023-04-25 PROCEDURE — 99214 PR OFFICE/OUTPT VISIT, EST, LEVL IV, 30-39 MIN: ICD-10-PCS | Mod: S$PBB,,, | Performed by: INTERNAL MEDICINE

## 2023-04-25 PROCEDURE — 99215 OFFICE O/P EST HI 40 MIN: CPT | Mod: PBBFAC,PO | Performed by: INTERNAL MEDICINE

## 2023-04-25 PROCEDURE — 99999 PR PBB SHADOW E&M-EST. PATIENT-LVL V: CPT | Mod: PBBFAC,,, | Performed by: INTERNAL MEDICINE

## 2023-04-25 PROCEDURE — 99214 OFFICE O/P EST MOD 30 MIN: CPT | Mod: S$PBB,,, | Performed by: INTERNAL MEDICINE

## 2023-04-25 RX ORDER — ACETAMINOPHEN AND CODEINE PHOSPHATE 300; 30 MG/1; MG/1
1 TABLET ORAL EVERY 6 HOURS PRN
Qty: 30 TABLET | Refills: 1 | Status: SHIPPED | OUTPATIENT
Start: 2023-04-25 | End: 2023-05-05

## 2023-04-26 ENCOUNTER — PATIENT MESSAGE (OUTPATIENT)
Dept: ADMINISTRATIVE | Facility: OTHER | Age: 73
End: 2023-04-26
Payer: MEDICARE

## 2023-04-26 ENCOUNTER — OFFICE VISIT (OUTPATIENT)
Dept: SURGERY | Facility: CLINIC | Age: 73
End: 2023-04-26
Payer: MEDICARE

## 2023-04-26 ENCOUNTER — TELEPHONE (OUTPATIENT)
Dept: GASTROENTEROLOGY | Facility: CLINIC | Age: 73
End: 2023-04-26
Payer: MEDICARE

## 2023-04-26 VITALS
BODY MASS INDEX: 25.85 KG/M2 | WEIGHT: 155.19 LBS | DIASTOLIC BLOOD PRESSURE: 62 MMHG | SYSTOLIC BLOOD PRESSURE: 136 MMHG | HEIGHT: 65 IN | HEART RATE: 77 BPM

## 2023-04-26 DIAGNOSIS — G60.9 IDIOPATHIC NEUROPATHY: ICD-10-CM

## 2023-04-26 DIAGNOSIS — I27.29 PULMONARY HYPERTENSION ASSOCIATED WITH SYSTEMIC DISORDER: ICD-10-CM

## 2023-04-26 DIAGNOSIS — I10 ESSENTIAL HYPERTENSION: ICD-10-CM

## 2023-04-26 DIAGNOSIS — I87.2 VENOUS INSUFFICIENCY OF BOTH LOWER EXTREMITIES: ICD-10-CM

## 2023-04-26 DIAGNOSIS — M34.9 SCLERODERMA WITH PULMONARY INVOLVEMENT: ICD-10-CM

## 2023-04-26 DIAGNOSIS — I73.9 PVD (PERIPHERAL VASCULAR DISEASE): ICD-10-CM

## 2023-04-26 DIAGNOSIS — R59.0 INGUINAL ADENOPATHY: ICD-10-CM

## 2023-04-26 DIAGNOSIS — J84.9 ILD (INTERSTITIAL LUNG DISEASE): ICD-10-CM

## 2023-04-26 DIAGNOSIS — G89.4 CHRONIC PAIN DISORDER: ICD-10-CM

## 2023-04-26 DIAGNOSIS — K40.20 NON-RECURRENT BILATERAL INGUINAL HERNIA WITHOUT OBSTRUCTION OR GANGRENE: Primary | ICD-10-CM

## 2023-04-26 PROBLEM — K44.9 HIATAL HERNIA: Status: RESOLVED | Noted: 2023-03-24 | Resolved: 2023-04-26

## 2023-04-26 PROBLEM — R13.10 DYSPHAGIA: Status: RESOLVED | Noted: 2020-03-04 | Resolved: 2023-04-26

## 2023-04-26 PROCEDURE — 99214 OFFICE O/P EST MOD 30 MIN: CPT | Mod: PBBFAC,NTX | Performed by: SURGERY

## 2023-04-26 PROCEDURE — 99214 PR OFFICE/OUTPT VISIT, EST, LEVL IV, 30-39 MIN: ICD-10-PCS | Mod: S$PBB,24,NTX, | Performed by: SURGERY

## 2023-04-26 PROCEDURE — 99214 OFFICE O/P EST MOD 30 MIN: CPT | Mod: S$PBB,24,NTX, | Performed by: SURGERY

## 2023-04-26 PROCEDURE — 99999 PR PBB SHADOW E&M-EST. PATIENT-LVL IV: ICD-10-PCS | Mod: PBBFAC,TXP,, | Performed by: SURGERY

## 2023-04-26 PROCEDURE — 99999 PR PBB SHADOW E&M-EST. PATIENT-LVL IV: CPT | Mod: PBBFAC,TXP,, | Performed by: SURGERY

## 2023-04-26 RX ORDER — CEFAZOLIN SODIUM 2 G/50ML
2 SOLUTION INTRAVENOUS
Status: CANCELLED | OUTPATIENT
Start: 2023-04-26

## 2023-04-26 RX ORDER — SODIUM CHLORIDE 9 MG/ML
INJECTION, SOLUTION INTRAVENOUS CONTINUOUS
Status: CANCELLED | OUTPATIENT
Start: 2023-04-26

## 2023-04-26 NOTE — H&P (VIEW-ONLY)
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 She has had symptoms of heartburn, dysphagia, and nocturnal regurgitation for the last 30 years, which have progressively worsened over the last two despite taking Aciphex. GES 5/4/20 demonstrated 25% retention at 4 hours. GP symptoms are well controlled with dietary modifications. Denies nausea, vomiting, gas bloat, or abdominal pain. Rare dysphagia. Rare early satiety. Weight is stable.     Work-up includes:  HREM 3/4/20: Low LES pressure with complete relaxation, absent contractility, hiatal hernia  pH impedence 3/6/30: [place] 133  GES 5/4/20: Retention of 25% at 4 hours  EGD 7/1/22: LA grade A esophagitis, nondysplastic Pagan's, benign-appearing stenosis, 2-cm hiatal hernia.     Interval History 3/22/23:  Patient is a 72 year-old woman who returns for pre-op discussion. Her symptoms and overall health are unchanged. Her case was presented at [facility] 11/8/22 at which time concomitant pyloromyotomy and hiatal hernia repair without fundoplication was recommended.    Interval History 4/5/23:  Patient here for follow up following laparoscopic hiatal hernia repair with pyloromyotomy performed on 3/23/23. Patient doing well since surgery. Post-op pain has resolved. Tolerating soft diet - occasionally feels like food gets stuck when swallowing. Denies any regurgitation, nausea/vomiting, or reflux. Denies fever or chills.     Interval History 4/26/2023:  Patient presents to clinic today for surgical evaluation of bilateral inguinal hernias. Patient first noticed a right sided bulge a few months ago. She reports it has increased in size since that time. She does not  have much pain, but reports some discomfort if she were to press on the area. Her Rheumatologist ordered a US that demonstrated fat and bowel containing R inguinal hernia and fat containing L inguinal hernia. She denies any significant pain, overlying skin changes, or obstructive symptoms.       No chief complaint on file.    Review of patient's allergies indicates:   Allergen Reactions    Sulfa (sulfonamide antibiotics)      Other reaction(s): Rash    Tramadol Itching     Current Outpatient Medications   Medication Sig Dispense Refill    acetaminophen-codeine 300-30mg (TYLENOL #3) 300-30 mg Tab Take 1 tablet by mouth every 6 (six) hours as needed (pain). 30 tablet 1    albuterol (VENTOLIN HFA) 90 mcg/actuation inhaler Inhale 2 puffs into the lungs every 4 (four) hours as needed for Wheezing. Rescue 18 g 3    carboxymethylcellulose sodium (REFRESH TEARS) 0.5 % Drop Apply 1-2 drops to every as needed      ergocalciferol (ERGOCALCIFEROL) 50,000 unit Cap Take 1 capsule (50,000 Units total) by mouth every 7 days. 12 capsule 1    ferrous sulfate 325 (65 FE) MG EC tablet Take 325 mg by mouth every morning.      flu vac 2022 [id number],PF, (FLUAD QUAD 2022-23,65Y UP,,PF,) 60 mcg (15 mcg x 4)/0.5 mL Syrg Inject into the muscle. 0.5 mL 0    multivitamin capsule Take 1 capsule by mouth once daily.      mycophenolate mofetil (CELLCEPT) 200 mg/mL SusR Take 5 mLs (1,000 mg total) by mouth 2 (two) times daily. 480 mL 1    nabumetone (RELAFEN) 750 MG tablet Take 1 tablet (750 mg total) by mouth 2 (two) times daily as needed for Pain. (Patient taking differently: Take 750 mg by mouth every evening.) 60 tablet 3    NIFEdipine (ADALAT CC) 90 MG TbSR TAKE 1 TABLET(90 MG) BY MOUTH EVERY DAY (Patient taking differently: Take by mouth nightly. TAKE 1 TABLET(90 MG) BY MOUTH EVERY DAY (TAKES NIGHTLY WITH 30 MG TABLET TOTAL 120 MG)) 90 tablet 3    NIFEdipine (PROCARDIA-XL) 30 MG (OSM) 24 hr tablet TAKE 1 TABLET(30 MG) BY MOUTH EVERY DAY  (Patient taking differently: Take by mouth nightly. TAKE WITH 90 MG TABLET EVERY NIGHT (TOTAL 120 MG)) 30 tablet 11    pravastatin (PRAVACHOL) 20 MG tablet TAKE 1 TABLET(20 MG) BY MOUTH EVERY EVENING 90 tablet 2    pregabalin (LYRICA) 300 MG Cap Take 1 capsule (300 mg total) by mouth 2 (two) times daily. (Patient taking differently: Take 300 mg by mouth every evening.) 60 capsule 6    PREVIDENT 5000 BOOSTER PLUS 1.1 % Pste SMARTSIG:To Teeth      PREVIDENT 5000 SENSITIVE 1.1-5 % Pste BRUSH FOR 2 MINUTES TWICE PER DAY      RABEprazole (ACIPHEX) 20 mg tablet TAKE 1 TABLET BY MOUTH TWICE DAILY (Patient taking differently: Take by mouth every morning.) 60 tablet 11    sars-cov-2, covid-19, (MODERNA COVID-19) 50 mcg/0.25 ml injection (BOOSTER) Inject into the muscle. 0.25 mL 0    tadalafil (ADCIRCA) 20 mg Tab Take 2 tablets (40 mg total) by mouth once daily. (Patient taking differently: Take 40 mg by mouth nightly.) 28 tablet 11    tiZANidine (ZANAFLEX) 4 MG tablet Take 1 tablet (4 mg total) by mouth nightly as needed (muscle pain). 30 tablet 3     No current facility-administered medications for this visit.     Past Medical History:   Diagnosis Date    Abnormal Pap smear     Acid reflux     Allergy     Arthritis     Encounter for blood transfusion     GERD (gastroesophageal reflux disease)     History of migraine headaches     Hx of colonic polyp     Hypertension     Idiopathic neuropathy 7/20/2012    ILD (interstitial lung disease) 11/6/2013    Iron deficiency anemia 3/18/2014    MRSA carrier     Osteopenia     Pneumonia     Pulmonary fibrosis     Pulmonary hypertension     Raynaud's disease     Scleroderma, diffuse     Sjogren's syndrome     Vitamin D deficiency 11/14/2013     Past Surgical History:   Procedure Laterality Date    24 HOUR IMPEDANCE PH MONITORING OF ESOPHAGUS IN PATIENT NOT TAKING ACID REDUCING MEDICATIONS N/A 3/4/2020    Procedure: IMPEDANCE PH STUDY, ESOPHAGEAL, 24 HOUR, IN PATIENT NOT TAKING ACID  REDUCING MEDICATION;  Surgeon: Annamaria Mendoza MD;  Location: [facility] (4TH FLR);  Service: Endoscopy;  Laterality: N/A;  OFF PPI/H2 Blocker   Motility Studies   Hold Narcotics x 1 days   Hold TCA x 1 days  2/26 - LVM attempting to confirm appt  2/27 - Confirmed appt    ANORECTAL MANOMETRY N/A 5/10/2021    Procedure: MANOMETRY, ANORECTAL with balloon expulsion test;  Surgeon: Annamaria Mendoza MD;  Location: [facility] (4TH FLR);  Service: Endoscopy;  Laterality: N/A;  order combined  covid test 5/7 [employer], instructions emailed-[facility]    BREAST BIOPSY      Left, benign    CERVICAL CONIZATION   W/ LASER  1970    COLONOSCOPY      COLONOSCOPY N/A 3/29/2019    Procedure: COLONOSCOPY;  Surgeon: Annamaria Mendoza MD;  Location: [facility] (2ND FLR);  Service: Endoscopy;  Laterality: N/A;    COLONOSCOPY N/A 5/10/2021    Procedure: COLONOSCOPY;  Surgeon: Annamaria Mendoza MD;  Location: [facility] (4TH FLR);  Service: Endoscopy;  Laterality: N/A;  2nd floor-previous scopes done on 2nd floor, gastroparesis  full liquid diet x2 days, clear liquid x1 day prior to procedure  covid test 5/7 [employer], instructions emailed-vt  5/6 pt confirmed appt-[facility]    DILATION AND CURETTAGE OF UTERUS      ESOPHAGEAL MANOMETRY WITH MEASUREMENT OF IMPEDANCE N/A 3/4/2020    Procedure: MANOMETRY, ESOPHAGUS, WITH IMPEDANCE MEASUREMENT;  Surgeon: Annamaria Mendoza MD;  Location: [facility] (4TH FLR);  Service: Endoscopy;  Laterality: N/A;  OFF PPI/H2 Blocker   Motility Studies   Hold Narcotics x 1 days   Hold TCA x 1 days    ESOPHAGOGASTRODUODENOSCOPY      ESOPHAGOGASTRODUODENOSCOPY N/A 3/29/2019    Procedure: EGD (ESOPHAGOGASTRODUODENOSCOPY);  Surgeon: Annamaria Mendoza MD;  Location: [facility] (2ND FLR);  Service: Endoscopy;  Laterality: N/A;  pulmonary htn    ESOPHAGOGASTRODUODENOSCOPY N/A 2/2/2021    Procedure: ESOPHAGOGASTRODUODENOSCOPY (EGD);  Surgeon: Annamaria Mendoza MD;  Location: [facility] (2ND FLR);  Service: Endoscopy;  Laterality: N/A;  2nd  floor gastroparesis  3 days full liquid diet and 1 day clears  covid test 1/30 primary care, instructions sent to [facility]    ESOPHAGOGASTRODUODENOSCOPY N/A 7/1/2022    Procedure: ESOPHAGOGASTRODUODENOSCOPY (EGD);  Surgeon: Annamaria Mendoza MD;  Location: [facility] FLR);  Service: Endoscopy;  Laterality: N/A;  2nd floor-gastroparesis  full liquid diet x3 days, clear liquid diet x1 day prior to procedure  fully vaccinated, instructions sent to [facility]  6/29-pt confirmed new arrival time-[facility] [place]    EXCISION, LESION, STOMACH, LAPAROSCOPIC N/A 3/23/2023    Procedure: XI ROBOTIC EXCISION, LESION, STOMACH;  Surgeon: Katherine Segura MD;  Location: [address];  Service: General;  Laterality: N/A;    HYSTERECTOMY  1990    FRANDY (AUB, Fibroids), ovaries remain    REPAIR, HERNIA, UMBILICAL N/A 3/23/2023    Procedure: REPAIR, HERNIA, UMBILICAL;  Surgeon: Katherine Segura MD;  Location: [address];  Service: General;  Laterality: N/A;    RIGHT HEART CATHETERIZATION Right 1/5/2021    Procedure: INSERTION, CATHETER, RIGHT HEART;  Surgeon: Aureliano Sy MD;  Location: [facility] CATH LAB;  Service: Cardiology;  Laterality: Right;    RIGHT HEART CATHETERIZATION Right 3/16/2023    Procedure: INSERTION, CATHETER, RIGHT HEART;  Surgeon: Aureliano Sy MD;  Location: [facility] CATH LAB;  Service: Cardiology;  Laterality: Right;    ROBOT-ASSISTED REPAIR OF HIATAL HERNIA USING DA VERNELL XI N/A 3/23/2023    Procedure: XI ROBOTIC REPAIR, HERNIA, HIATAL;  Surgeon: Katherine Segura MD;  Location: [address];  Service: General;  Laterality: N/A;    VARICOSE VEIN SURGERY      XI ROBOTIC GASTROPEXY N/A 3/23/2023    Procedure: XI ROBOTIC GASTROPEXY;  Surgeon: Katherine Segura MD;  Location: [address];  Service: General;  Laterality: N/A;    XI ROBOTIC PYLOROMYOTOMY N/A 3/23/2023    Procedure: XI ROBOTIC PYLOROMYOTOMY;  Surgeon: Katherine Segura MD;  Location: [facility]  "OR 2ND FLR;  Service: General;  Laterality: N/A;     Family History   Problem Relation Age of Onset    Breast cancer Mother     Hypertension Father     Breast cancer Sister     Diabetes Sister     Osteoarthritis Brother     Diabetes Brother     No Known Problems Daughter     No Known Problems Daughter     No Known Problems Son     No Known Problems Son     Breast cancer Maternal Aunt     Melanoma Neg Hx     Colon cancer Neg Hx     Crohn's disease Neg Hx     Stomach cancer Neg Hx     Ulcerative colitis Neg Hx     Rectal cancer Neg Hx     Irritable bowel syndrome Neg Hx     Esophageal cancer Neg Hx     Celiac disease Neg Hx     Ovarian cancer Neg Hx     Liver cancer Neg Hx     Pancreatic cancer Neg Hx      Social History     Tobacco Use    Smoking status: Never    Smokeless tobacco: Never   Substance Use Topics    Alcohol use: Yes     Comment: wine occasionally    Drug use: No        OBJECTIVE:     Vital Signs (Most Recent)     5' 5" (1.651 m)  70.4 kg (155 lb 3.3 oz)     Physical Exam:  Physical Exam  Vitals reviewed.   Constitutional:       General: She is not in acute distress.     Appearance: Normal appearance. She is well-developed. She is not ill-appearing.   HENT:      Head: Normocephalic and atraumatic.   Eyes:      General: No scleral icterus.     Conjunctiva/sclera: Conjunctivae normal.   Cardiovascular:      Rate and Rhythm: Normal rate and regular rhythm.      Pulses: Normal pulses.   Pulmonary:      Effort: Pulmonary effort is normal. No respiratory distress.   Abdominal:      General: There is no distension.      Palpations: Abdomen is soft.      Tenderness: There is no abdominal tenderness. There is no guarding.      Hernia: A hernia is present.      Comments: Incisions c/d/i   Genitourinary:     Comments: Reducible R femoral hernia - tender to deep palpation   Reducible L inguinal hernia   Musculoskeletal:         General: Deformity present.      Cervical back: Normal range of motion and neck supple. "   Skin:     General: Skin is warm and dry.   Neurological:      General: No focal deficit present.      Mental Status: She is alert and oriented to person, place, and time.   Psychiatric:         Mood and Affect: Mood normal.         Behavior: Behavior normal.     Laboratory  CBC: Reviewed  CMP: Reviewed    US 4/6/2023  Narrative & Impression  EXAMINATION:  US SOFT TISSUE, GROIN RIGHT     CLINICAL HISTORY:  Right groin mass.     TECHNIQUE:  Ultrasound evaluation of both inguinal canals was performed with the patient in the supine, sitting and standing positions.     COMPARISON:  None.     FINDINGS:  Right inguinal hernia containing fat and possible bowel with hernia neck measuring 1.0 cm.  Left inguinal fat-containing hernia with neck measuring 0.2 cm.     Multiple bilateral inguinal hernias demonstrating normal morphology with elliptical shape and fatty efren.  The largest in the right groin measures 1.7 cm in short axis.  The largest in the left groin measure 1.6 cm in short axis and 1.2 cm in short axis.     Impression:     Right inguinal hernia containing fat and possible bowel.  Left inguinal fat-containing hernia.     Mildly enlarged bilateral inguinal lymph nodes with normal morphology, possibly reactive.    ASSESSMENT/PLAN:   Patient is a 72 year-old woman with scleroderma esophagus, hiatal hernia, GERD, and gastroparesis. She is now s/p laparoscopic hiatal hernia repair with pyloroplasty performed on 3/23/23 and doing well. She now presents with easily reducible right femoral and left inguinal hernias confirmed with US.     - Schedule in OR for robotic bilateral femoral/inguinal hernia repair   - Consent obtained in office  - ED precautions provided     Tiera Diego  4/26/2023

## 2023-04-26 NOTE — PROGRESS NOTES
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 She has had symptoms of heartburn, dysphagia, and nocturnal regurgitation for the last 30 years, which have progressively worsened over the last two despite taking Aciphex. GES 5/4/20 demonstrated 25% retention at 4 hours. GP symptoms are well controlled with dietary modifications. Denies nausea, vomiting, gas bloat, or abdominal pain. Rare dysphagia. Rare early satiety. Weight is stable.     Work-up includes:  HREM 3/4/20: Low LES pressure with complete relaxation, absent contractility, hiatal hernia  pH impedence 3/6/30: [place] 133  GES 5/4/20: Retention of 25% at 4 hours  EGD 7/1/22: LA grade A esophagitis, nondysplastic Pagan's, benign-appearing stenosis, 2-cm hiatal hernia.     Interval History 3/22/23:  Patient is a 72 year-old woman who returns for pre-op discussion. Her symptoms and overall health are unchanged. Her case was presented at [facility] 11/8/22 at which time concomitant pyloromyotomy and hiatal hernia repair without fundoplication was recommended.    Interval History 4/5/23:  Patient here for follow up following laparoscopic hiatal hernia repair with pyloromyotomy performed on 3/23/23. Patient doing well since surgery. Post-op pain has resolved. Tolerating soft diet - occasionally feels like food gets stuck when swallowing. Denies any regurgitation, nausea/vomiting, or reflux. Denies fever or chills.     Interval History 4/26/2023:  Patient presents to clinic today for surgical evaluation of bilateral inguinal hernias. Patient first noticed a right sided bulge a few months ago. She reports it has increased in size since that time. She does not  have much pain, but reports some discomfort if she were to press on the area. Her Rheumatologist ordered a US that demonstrated fat and bowel containing R inguinal hernia and fat containing L inguinal hernia. She denies any significant pain, overlying skin changes, or obstructive symptoms.       No chief complaint on file.    Review of patient's allergies indicates:   Allergen Reactions    Sulfa (sulfonamide antibiotics)      Other reaction(s): Rash    Tramadol Itching     Current Outpatient Medications   Medication Sig Dispense Refill    acetaminophen-codeine 300-30mg (TYLENOL #3) 300-30 mg Tab Take 1 tablet by mouth every 6 (six) hours as needed (pain). 30 tablet 1    albuterol (VENTOLIN HFA) 90 mcg/actuation inhaler Inhale 2 puffs into the lungs every 4 (four) hours as needed for Wheezing. Rescue 18 g 3    carboxymethylcellulose sodium (REFRESH TEARS) 0.5 % Drop Apply 1-2 drops to every as needed      ergocalciferol (ERGOCALCIFEROL) 50,000 unit Cap Take 1 capsule (50,000 Units total) by mouth every 7 days. 12 capsule 1    ferrous sulfate 325 (65 FE) MG EC tablet Take 325 mg by mouth every morning.      flu vac 2022 65up-tyvPN84N,PF, (FLUAD QUAD 2022-23,65Y UP,,PF,) 60 mcg (15 mcg x 4)/0.5 mL Syrg Inject into the muscle. 0.5 mL 0    multivitamin capsule Take 1 capsule by mouth once daily.      mycophenolate mofetil (CELLCEPT) 200 mg/mL SusR Take 5 mLs (1,000 mg total) by mouth 2 (two) times daily. 480 mL 1    nabumetone (RELAFEN) 750 MG tablet Take 1 tablet (750 mg total) by mouth 2 (two) times daily as needed for Pain. (Patient taking differently: Take 750 mg by mouth every evening.) 60 tablet 3    NIFEdipine (ADALAT CC) 90 MG TbSR TAKE 1 TABLET(90 MG) BY MOUTH EVERY DAY (Patient taking differently: Take by mouth nightly. TAKE 1 TABLET(90 MG) BY MOUTH EVERY DAY (TAKES NIGHTLY WITH 30 MG TABLET TOTAL 120 MG)) 90 tablet 3    NIFEdipine (PROCARDIA-XL) 30 MG (OSM) 24 hr tablet TAKE 1 TABLET(30 MG) BY MOUTH EVERY DAY  (Patient taking differently: Take by mouth nightly. TAKE WITH 90 MG TABLET EVERY NIGHT (TOTAL 120 MG)) 30 tablet 11    pravastatin (PRAVACHOL) 20 MG tablet TAKE 1 TABLET(20 MG) BY MOUTH EVERY EVENING 90 tablet 2    pregabalin (LYRICA) 300 MG Cap Take 1 capsule (300 mg total) by mouth 2 (two) times daily. (Patient taking differently: Take 300 mg by mouth every evening.) 60 capsule 6    PREVIDENT 5000 BOOSTER PLUS 1.1 % Pste SMARTSIG:To Teeth      PREVIDENT 5000 SENSITIVE 1.1-5 % Pste BRUSH FOR 2 MINUTES TWICE PER DAY      RABEprazole (ACIPHEX) 20 mg tablet TAKE 1 TABLET BY MOUTH TWICE DAILY (Patient taking differently: Take by mouth every morning.) 60 tablet 11    sars-cov-2, covid-19, (MODERNA COVID-19) 50 mcg/0.25 ml injection (BOOSTER) Inject into the muscle. 0.25 mL 0    tadalafil (ADCIRCA) 20 mg Tab Take 2 tablets (40 mg total) by mouth once daily. (Patient taking differently: Take 40 mg by mouth nightly.) 28 tablet 11    tiZANidine (ZANAFLEX) 4 MG tablet Take 1 tablet (4 mg total) by mouth nightly as needed (muscle pain). 30 tablet 3     No current facility-administered medications for this visit.     Past Medical History:   Diagnosis Date    Abnormal Pap smear     Acid reflux     Allergy     Arthritis     Encounter for blood transfusion     GERD (gastroesophageal reflux disease)     History of migraine headaches     Hx of colonic polyp     Hypertension     Idiopathic neuropathy 7/20/2012    ILD (interstitial lung disease) 11/6/2013    Iron deficiency anemia 3/18/2014    MRSA carrier     Osteopenia     Pneumonia     Pulmonary fibrosis     Pulmonary hypertension     Raynaud's disease     Scleroderma, diffuse     Sjogren's syndrome     Vitamin D deficiency 11/14/2013     Past Surgical History:   Procedure Laterality Date    24 HOUR IMPEDANCE PH MONITORING OF ESOPHAGUS IN PATIENT NOT TAKING ACID REDUCING MEDICATIONS N/A 3/4/2020    Procedure: IMPEDANCE PH STUDY, ESOPHAGEAL, 24 HOUR, IN PATIENT NOT TAKING ACID  REDUCING MEDICATION;  Surgeon: Annamaria Mendoza MD;  Location: [facility] (4TH FLR);  Service: Endoscopy;  Laterality: N/A;  OFF PPI/H2 Blocker   Motility Studies   Hold Narcotics x 1 days   Hold TCA x 1 days  2/26 - LVM attempting to confirm appt  2/27 - Confirmed appt    ANORECTAL MANOMETRY N/A 5/10/2021    Procedure: MANOMETRY, ANORECTAL with balloon expulsion test;  Surgeon: Annamaria Mendoza MD;  Location: [facility] (4TH FLR);  Service: Endoscopy;  Laterality: N/A;  order combined  covid test 5/7 Yarsani, instructions emailed-[facility]    BREAST BIOPSY      Left, benign    CERVICAL CONIZATION   W/ LASER  1970    COLONOSCOPY      COLONOSCOPY N/A 3/29/2019    Procedure: COLONOSCOPY;  Surgeon: Annamaria Mendoza MD;  Location: [facility] (2ND FLR);  Service: Endoscopy;  Laterality: N/A;    COLONOSCOPY N/A 5/10/2021    Procedure: COLONOSCOPY;  Surgeon: Annamaria Mendoza MD;  Location: [facility] (4TH FLR);  Service: Endoscopy;  Laterality: N/A;  2nd floor-previous scopes done on 2nd floor, gastroparesis  full liquid diet x2 days, clear liquid x1 day prior to procedure  covid test 5/7 Yarsani, instructions emailed-vt  5/6 pt confirmed appt-[facility]    DILATION AND CURETTAGE OF UTERUS      ESOPHAGEAL MANOMETRY WITH MEASUREMENT OF IMPEDANCE N/A 3/4/2020    Procedure: MANOMETRY, ESOPHAGUS, WITH IMPEDANCE MEASUREMENT;  Surgeon: Annamaria Mendoza MD;  Location: [facility] (4TH FLR);  Service: Endoscopy;  Laterality: N/A;  OFF PPI/H2 Blocker   Motility Studies   Hold Narcotics x 1 days   Hold TCA x 1 days    ESOPHAGOGASTRODUODENOSCOPY      ESOPHAGOGASTRODUODENOSCOPY N/A 3/29/2019    Procedure: EGD (ESOPHAGOGASTRODUODENOSCOPY);  Surgeon: Annamaria Mendoza MD;  Location: [facility] (2ND FLR);  Service: Endoscopy;  Laterality: N/A;  pulmonary htn    ESOPHAGOGASTRODUODENOSCOPY N/A 2/2/2021    Procedure: ESOPHAGOGASTRODUODENOSCOPY (EGD);  Surgeon: Annamaria Mendoza MD;  Location: [facility] (2ND FLR);  Service: Endoscopy;  Laterality: N/A;  2nd  floor gastroparesis  3 days full liquid diet and 1 day clears  covid test 1/30 primary care, instructions sent to [facility]    ESOPHAGOGASTRODUODENOSCOPY N/A 7/1/2022    Procedure: ESOPHAGOGASTRODUODENOSCOPY (EGD);  Surgeon: Annamaria Mendoza MD;  Location: [facility] FLR);  Service: Endoscopy;  Laterality: N/A;  2nd floor-gastroparesis  full liquid diet x3 days, clear liquid diet x1 day prior to procedure  fully vaccinated, instructions sent to [facility]  6/29-pt confirmed new arrival time-[facility]    EXCISION, LESION, STOMACH, LAPAROSCOPIC N/A 3/23/2023    Procedure: XI ROBOTIC EXCISION, LESION, STOMACH;  Surgeon: Katherine Segura MD;  Location: [address];  Service: General;  Laterality: N/A;    HYSTERECTOMY  1990    FRANDY (AUB, Fibroids), ovaries remain    REPAIR, HERNIA, UMBILICAL N/A 3/23/2023    Procedure: REPAIR, HERNIA, UMBILICAL;  Surgeon: Katherine Segura MD;  Location: [address];  Service: General;  Laterality: N/A;    RIGHT HEART CATHETERIZATION Right 1/5/2021    Procedure: INSERTION, CATHETER, RIGHT HEART;  Surgeon: Aureliano Sy MD;  Location: [facility] CATH LAB;  Service: Cardiology;  Laterality: Right;    RIGHT HEART CATHETERIZATION Right 3/16/2023    Procedure: INSERTION, CATHETER, RIGHT HEART;  Surgeon: Aureliano Sy MD;  Location: [facility] CATH LAB;  Service: Cardiology;  Laterality: Right;    ROBOT-ASSISTED REPAIR OF HIATAL HERNIA USING DA VERNELL XI N/A 3/23/2023    Procedure: XI ROBOTIC REPAIR, HERNIA, HIATAL;  Surgeon: Katherine Segura MD;  Location: [address];  Service: General;  Laterality: N/A;    VARICOSE VEIN SURGERY      XI ROBOTIC GASTROPEXY N/A 3/23/2023    Procedure: XI ROBOTIC GASTROPEXY;  Surgeon: Katherine Segura MD;  Location: [address];  Service: General;  Laterality: N/A;    XI ROBOTIC PYLOROMYOTOMY N/A 3/23/2023    Procedure: XI ROBOTIC PYLOROMYOTOMY;  Surgeon: Katherine Segura MD;  Location: [facility]  "OR 2ND FLR;  Service: General;  Laterality: N/A;     Family History   Problem Relation Age of Onset    Breast cancer Mother     Hypertension Father     Breast cancer Sister     Diabetes Sister     Osteoarthritis Brother     Diabetes Brother     No Known Problems Daughter     No Known Problems Daughter     No Known Problems Son     No Known Problems Son     Breast cancer Maternal Aunt     Melanoma Neg Hx     Colon cancer Neg Hx     Crohn's disease Neg Hx     Stomach cancer Neg Hx     Ulcerative colitis Neg Hx     Rectal cancer Neg Hx     Irritable bowel syndrome Neg Hx     Esophageal cancer Neg Hx     Celiac disease Neg Hx     Ovarian cancer Neg Hx     Liver cancer Neg Hx     Pancreatic cancer Neg Hx      Social History     Tobacco Use    Smoking status: Never    Smokeless tobacco: Never   Substance Use Topics    Alcohol use: Yes     Comment: wine occasionally    Drug use: No        OBJECTIVE:     Vital Signs (Most Recent)     5' 5" (1.651 m)  70.4 kg (155 lb 3.3 oz)     Physical Exam:  Physical Exam  Vitals reviewed.   Constitutional:       General: She is not in acute distress.     Appearance: Normal appearance. She is well-developed. She is not ill-appearing.   HENT:      Head: Normocephalic and atraumatic.   Eyes:      General: No scleral icterus.     Conjunctiva/sclera: Conjunctivae normal.   Cardiovascular:      Rate and Rhythm: Normal rate and regular rhythm.      Pulses: Normal pulses.   Pulmonary:      Effort: Pulmonary effort is normal. No respiratory distress.   Abdominal:      General: There is no distension.      Palpations: Abdomen is soft.      Tenderness: There is no abdominal tenderness. There is no guarding.      Hernia: A hernia is present.      Comments: Incisions c/d/i   Genitourinary:     Comments: Reducible R femoral hernia - tender to deep palpation   Reducible L inguinal hernia   Musculoskeletal:         General: Deformity present.      Cervical back: Normal range of motion and neck supple. "   Skin:     General: Skin is warm and dry.   Neurological:      General: No focal deficit present.      Mental Status: She is alert and oriented to person, place, and time.   Psychiatric:         Mood and Affect: Mood normal.         Behavior: Behavior normal.     Laboratory  CBC: Reviewed  CMP: Reviewed    US 4/6/2023  Narrative & Impression  EXAMINATION:  US SOFT TISSUE, GROIN RIGHT     CLINICAL HISTORY:  Right groin mass.     TECHNIQUE:  Ultrasound evaluation of both inguinal canals was performed with the patient in the supine, sitting and standing positions.     COMPARISON:  None.     FINDINGS:  Right inguinal hernia containing fat and possible bowel with hernia neck measuring 1.0 cm.  Left inguinal fat-containing hernia with neck measuring 0.2 cm.     Multiple bilateral inguinal hernias demonstrating normal morphology with elliptical shape and fatty efren.  The largest in the right groin measures 1.7 cm in short axis.  The largest in the left groin measure 1.6 cm in short axis and 1.2 cm in short axis.     Impression:     Right inguinal hernia containing fat and possible bowel.  Left inguinal fat-containing hernia.     Mildly enlarged bilateral inguinal lymph nodes with normal morphology, possibly reactive.    ASSESSMENT/PLAN:   Patient is a 72 year-old woman with scleroderma esophagus, hiatal hernia, GERD, and gastroparesis. She is now s/p laparoscopic hiatal hernia repair with pyloroplasty performed on 3/23/23 and doing well. She now presents with easily reducible right femoral and left inguinal hernias confirmed with US.     - Schedule in OR for robotic bilateral femoral/inguinal hernia repair   - Consent obtained in office  - ED precautions provided     Tiera Diego  4/26/2023

## 2023-04-27 ENCOUNTER — OFFICE VISIT (OUTPATIENT)
Dept: HEMATOLOGY/ONCOLOGY | Facility: CLINIC | Age: 73
End: 2023-04-27
Payer: MEDICARE

## 2023-04-27 VITALS
BODY MASS INDEX: 25.56 KG/M2 | TEMPERATURE: 98 F | DIASTOLIC BLOOD PRESSURE: 69 MMHG | HEIGHT: 65 IN | SYSTOLIC BLOOD PRESSURE: 150 MMHG | HEART RATE: 72 BPM | OXYGEN SATURATION: 100 % | RESPIRATION RATE: 18 BRPM | WEIGHT: 153.44 LBS

## 2023-04-27 DIAGNOSIS — D21.4 BENIGN GASTROINTESTINAL STROMAL TUMOR (GIST): ICD-10-CM

## 2023-04-27 DIAGNOSIS — I27.20 PULMONARY HYPERTENSION: ICD-10-CM

## 2023-04-27 DIAGNOSIS — I10 ESSENTIAL HYPERTENSION: ICD-10-CM

## 2023-04-27 DIAGNOSIS — M34.9 SCLERODERMA WITH PULMONARY INVOLVEMENT: ICD-10-CM

## 2023-04-27 DIAGNOSIS — C49.A2 GASTROINTESTINAL STROMAL TUMOR (GIST) OF STOMACH: Primary | ICD-10-CM

## 2023-04-27 PROCEDURE — 99204 PR OFFICE/OUTPT VISIT, NEW, LEVL IV, 45-59 MIN: ICD-10-PCS | Mod: S$PBB,,, | Performed by: INTERNAL MEDICINE

## 2023-04-27 PROCEDURE — 99204 OFFICE O/P NEW MOD 45 MIN: CPT | Mod: S$PBB,,, | Performed by: INTERNAL MEDICINE

## 2023-04-27 PROCEDURE — 99999 PR PBB SHADOW E&M-EST. PATIENT-LVL V: ICD-10-PCS | Mod: PBBFAC,,, | Performed by: INTERNAL MEDICINE

## 2023-04-27 PROCEDURE — 99999 PR PBB SHADOW E&M-EST. PATIENT-LVL V: CPT | Mod: PBBFAC,,, | Performed by: INTERNAL MEDICINE

## 2023-04-27 PROCEDURE — 99215 OFFICE O/P EST HI 40 MIN: CPT | Mod: PBBFAC | Performed by: INTERNAL MEDICINE

## 2023-04-27 RX ORDER — RABEPRAZOLE SODIUM 20 MG/1
20 TABLET, DELAYED RELEASE ORAL 2 TIMES DAILY
Qty: 60 TABLET | Refills: 11 | Status: SHIPPED | OUTPATIENT
Start: 2023-04-27

## 2023-04-27 NOTE — PROGRESS NOTES
MEDICAL ONCOLOGY - NEW PATIENT VISIT    Reason for visit: GIST Gastric     Best Contact Phone Number(s): [phone] (home)      Cancer/Stage/TNM:    Cancer Staging   No matching staging information was found for the patient.     Oncology History    No history exists.        HPI:   72 y.o. female with HTN, gastroparesis, scleroderma c/w ILD, pHTN, Raynaud's, absent esophageal contractility, with h/o hiatal hernia with GERD and non-dysplastic Pagan's esophagus who presents for a new diagnosis of GIST.   She underwent laparoscopic hiatal hernia repair with pyloroplasty on 03/23/23. Path returned with a subserosal nodule on proximal anterior lesser curve c/w 0.5 cm low-grade GIST with extensions of proliferation to margins.   She is planned to undergo repair for inguinofemoral hernia repair.     Today, she presents with her  today. She has recovered well from surgery. Her reflux has improved significantly after surgery, but still has trouble swallowing.       History has been obtained by chart review and discussion with the patient.    ROS:   Review of Systems   Constitutional:  Negative for chills, fever and weight loss.   HENT:  Positive for hearing loss. Negative for sore throat.         Dry mouth   Eyes:  Negative for blurred vision.        Dry eyes   Respiratory:  Positive for shortness of breath (on exertion). Negative for cough and wheezing.    Cardiovascular:  Positive for leg swelling. Negative for chest pain.   Gastrointestinal:  Negative for abdominal pain, constipation, diarrhea, heartburn, nausea and vomiting.        Dysphagia   Genitourinary:  Negative for dysuria.   Musculoskeletal:  Positive for joint pain.     Past Medical History:   Past Medical History:   Diagnosis Date    Abnormal Pap smear     Acid reflux     Allergy     Arthritis     Encounter for blood transfusion     GERD (gastroesophageal reflux disease)     Hiatal hernia 3/24/2023    History of migraine headaches     Hx of colonic  polyp     Hypertension     Idiopathic neuropathy 7/20/2012    ILD (interstitial lung disease) 11/6/2013    Iron deficiency anemia 3/18/2014    MRSA carrier     Osteopenia     Pneumonia     Pulmonary fibrosis     Pulmonary hypertension     Raynaud's disease     Scleroderma, diffuse     Sjogren's syndrome     Vitamin D deficiency 11/14/2013        Past Surgical History:   Past Surgical History:   Procedure Laterality Date    24 HOUR IMPEDANCE PH MONITORING OF ESOPHAGUS IN PATIENT NOT TAKING ACID REDUCING MEDICATIONS N/A 3/4/2020    Procedure: IMPEDANCE PH STUDY, ESOPHAGEAL, 24 HOUR, IN PATIENT NOT TAKING ACID REDUCING MEDICATION;  Surgeon: Annamaria Mendoza MD;  Location: [facility] (4TH FLR);  Service: Endoscopy;  Laterality: N/A;  OFF PPI/H2 Blocker   Motility Studies   Hold Narcotics x 1 days   Hold TCA x 1 days  2/26 - LVM attempting to confirm appt  2/27 - Confirmed appt    ANORECTAL MANOMETRY N/A 5/10/2021    Procedure: MANOMETRY, ANORECTAL with balloon expulsion test;  Surgeon: Annamaria Mendoza MD;  Location: [facility] (4TH FLR);  Service: Endoscopy;  Laterality: N/A;  order combined  covid test 5/7 [facility], instructions emailed-KPvt    BREAST BIOPSY      Left, benign    CERVICAL CONIZATION   W/ LASER  1970    COLONOSCOPY      COLONOSCOPY N/A 3/29/2019    Procedure: COLONOSCOPY;  Surgeon: Annamaria Mendoza MD;  Location: [facility] (2ND FLR);  Service: Endoscopy;  Laterality: N/A;    COLONOSCOPY N/A 5/10/2021    Procedure: COLONOSCOPY;  Surgeon: Annamaria Mendoza MD;  Location: [facility] (4TH FLR);  Service: Endoscopy;  Laterality: N/A;  2nd floor-previous scopes done on 2nd floor, gastroparesis  full liquid diet x2 days, clear liquid x1 day prior to procedure  covid test 5/7 [employer], instructions emailed-KPvt  5/6 pt confirmed appt-KPvt    DILATION AND CURETTAGE OF UTERUS      ESOPHAGEAL MANOMETRY WITH MEASUREMENT OF IMPEDANCE N/A 3/4/2020    Procedure: MANOMETRY, ESOPHAGUS, WITH IMPEDANCE MEASUREMENT;  Surgeon:  Annamaria Mendoza MD;  Location: [facility] (4TH FLR);  Service: Endoscopy;  Laterality: N/A;  OFF PPI/H2 Blocker   Motility Studies   Hold Narcotics x 1 days   Hold TCA x 1 days    ESOPHAGOGASTRODUODENOSCOPY      ESOPHAGOGASTRODUODENOSCOPY N/A 3/29/2019    Procedure: EGD (ESOPHAGOGASTRODUODENOSCOPY);  Surgeon: Annamaria Mendoza MD;  Location: [facility] ENDO (2ND FLR);  Service: Endoscopy;  Laterality: N/A;  pulmonary htn    ESOPHAGOGASTRODUODENOSCOPY N/A 2/2/2021    Procedure: ESOPHAGOGASTRODUODENOSCOPY (EGD);  Surgeon: Annamaria Mendoza MD;  Location: [facility] (2ND FLR);  Service: Endoscopy;  Laterality: N/A;  2nd floor gastroparesis  3 days full liquid diet and 1 day clears  covid test 1/30 primary care, instructions sent to [facility]    ESOPHAGOGASTRODUODENOSCOPY N/A 7/1/2022    Procedure: ESOPHAGOGASTRODUODENOSCOPY (EGD);  Surgeon: Annamaria Mendoza MD;  Location: [facility] (2ND FLR);  Service: Endoscopy;  Laterality: N/A;  2nd floor-gastroparesis  full liquid diet x3 days, clear liquid diet x1 day prior to procedure  fully vaccinated, instructions sent to [facility]  6/29-pt confirmed new arrival time-[facility]    EXCISION, LESION, STOMACH, LAPAROSCOPIC N/A 3/23/2023    Procedure: XI ROBOTIC EXCISION, LESION, STOMACH;  Surgeon: Katherine Segura MD;  Location: [facility] 2ND FLR;  Service: General;  Laterality: N/A;    HYSTERECTOMY  1990    FRANDY (AUB, Fibroids), ovaries remain    REPAIR, HERNIA, UMBILICAL N/A 3/23/2023    Procedure: REPAIR, HERNIA, UMBILICAL;  Surgeon: Katherine Segura MD;  Location: [facility] OR 2ND FLR;  Service: General;  Laterality: N/A;    RIGHT HEART CATHETERIZATION Right 1/5/2021    Procedure: INSERTION, CATHETER, RIGHT HEART;  Surgeon: Aureliano Sy MD;  Location: [facility] CATH LAB;  Service: Cardiology;  Laterality: Right;    RIGHT HEART CATHETERIZATION Right 3/16/2023    Procedure: INSERTION, CATHETER, RIGHT HEART;  Surgeon: Aureliano Sy MD;  Location: [facility] CATH LAB;   Service: Cardiology;  Laterality: Right;    ROBOT-ASSISTED REPAIR OF HIATAL HERNIA USING DA VERNELL XI N/A 3/23/2023    Procedure: XI ROBOTIC REPAIR, HERNIA, HIATAL;  Surgeon: Katherine Segura MD;  Location: [facility] OR [address] [place] [address];  Service: General;  Laterality: N/A;    VARICOSE VEIN SURGERY      XI ROBOTIC GASTROPEXY N/A 3/23/2023    Procedure: XI ROBOTIC GASTROPEXY;  Surgeon: Katherine Segura MD;  Location: [facility] OR [address] [place] [address];  Service: General;  Laterality: N/A;    XI ROBOTIC PYLOROMYOTOMY N/A 3/23/2023    Procedure: XI ROBOTIC PYLOROMYOTOMY;  Surgeon: Katherine Segura MD;  Location: [facility] OR [address] [place] [address];  Service: General;  Laterality: N/A;        Family History:   Family History   Problem Relation Age of Onset    Breast cancer Mother     Hypertension Father     Breast cancer Sister     Diabetes Sister     Osteoarthritis Brother     Diabetes Brother     No Known Problems Daughter     No Known Problems Daughter     No Known Problems Son     No Known Problems Son     Breast cancer Maternal Aunt     Melanoma Neg Hx     Colon cancer Neg Hx     Crohn's disease Neg Hx     Stomach cancer Neg Hx     Ulcerative colitis Neg Hx     Rectal cancer Neg Hx     Irritable bowel syndrome Neg Hx     Esophageal cancer Neg Hx     Celiac disease Neg Hx     Ovarian cancer Neg Hx     Liver cancer Neg Hx     Pancreatic cancer Neg Hx         Social History:   Social History     Tobacco Use    Smoking status: Never    Smokeless tobacco: Never   Substance Use Topics    Alcohol use: Yes     Comment: wine occasionally        I have reviewed and updated the patient's past medical, surgical, family and social histories.    Allergies:   Review of patient's allergies indicates:   Allergen Reactions    Sulfa (sulfonamide antibiotics)      Other reaction(s): Rash    Tramadol Itching        Medications:   Current Outpatient Medications   Medication Sig Dispense Refill    acetaminophen-codeine 300-30mg (TYLENOL #3) 300-30 mg Tab Take 1  tablet by mouth every 6 (six) hours as needed (pain). 30 tablet 1    albuterol (VENTOLIN HFA) 90 mcg/actuation inhaler Inhale 2 puffs into the lungs every 4 (four) hours as needed for Wheezing. Rescue 18 g 3    carboxymethylcellulose sodium (REFRESH TEARS) 0.5 % Drop Apply 1-2 drops to every as needed      ergocalciferol (ERGOCALCIFEROL) 50,000 unit Cap Take 1 capsule (50,000 Units total) by mouth every 7 days. 12 capsule 1    ferrous sulfate 325 (65 FE) MG EC tablet Take 325 mg by mouth every morning.      flu vac 2022 [id number],PF, (FLUAD QUAD 2022-23,65Y UP,,PF,) 60 mcg (15 mcg x 4)/0.5 mL Syrg Inject into the muscle. 0.5 mL 0    multivitamin capsule Take 1 capsule by mouth once daily.      mycophenolate mofetil (CELLCEPT) 200 mg/mL SusR Take 5 mLs (1,000 mg total) by mouth 2 (two) times daily. 480 mL 1    nabumetone (RELAFEN) 750 MG tablet Take 1 tablet (750 mg total) by mouth 2 (two) times daily as needed for Pain. (Patient taking differently: Take 750 mg by mouth every evening.) 60 tablet 3    NIFEdipine (ADALAT CC) 90 MG TbSR TAKE 1 TABLET(90 MG) BY MOUTH EVERY DAY (Patient taking differently: Take by mouth nightly. TAKE 1 TABLET(90 MG) BY MOUTH EVERY DAY (TAKES NIGHTLY WITH 30 MG TABLET TOTAL 120 MG)) 90 tablet 3    NIFEdipine (PROCARDIA-XL) 30 MG (OSM) 24 hr tablet TAKE 1 TABLET(30 MG) BY MOUTH EVERY DAY (Patient taking differently: Take by mouth nightly. TAKE WITH 90 MG TABLET EVERY NIGHT (TOTAL 120 MG)) 30 tablet 11    pravastatin (PRAVACHOL) 20 MG tablet TAKE 1 TABLET(20 MG) BY MOUTH EVERY EVENING 90 tablet 2    pregabalin (LYRICA) 300 MG Cap Take 1 capsule (300 mg total) by mouth 2 (two) times daily. (Patient taking differently: Take 300 mg by mouth every evening.) 60 capsule 6    PREVIDENT 5000 BOOSTER PLUS 1.1 % Pste SMARTSIG:To Teeth      PREVIDENT 5000 SENSITIVE 1.1-5 % Pste BRUSH FOR 2 MINUTES TWICE PER DAY      RABEprazole (ACIPHEX) 20 mg tablet Take 1 tablet (20 mg total) by mouth 2 (two)  "times daily. 60 tablet 11    sars-cov-2, covid-19, (MODERNA COVID-19) 50 mcg/0.25 ml injection (BOOSTER) Inject into the muscle. 0.25 mL 0    tadalafil (ADCIRCA) 20 mg Tab Take 2 tablets (40 mg total) by mouth once daily. (Patient taking differently: Take 40 mg by mouth nightly.) 28 tablet 11    tiZANidine (ZANAFLEX) 4 MG tablet Take 1 tablet (4 mg total) by mouth nightly as needed (muscle pain). 30 tablet 3     No current facility-administered medications for this visit.        Physical Exam:   BP (!) 150/69 (BP Location: Left arm, Patient Position: Sitting, BP Method: Medium (Automatic))   Pulse 72   Temp 97.7 °F (36.5 °C) (Oral)   Resp 18   Ht 5' 5" (1.651 m)   Wt 69.6 kg (153 lb 7 oz)   SpO2 100%   BMI 25.53 kg/m²      ECOG Performance status:0            Physical Exam  Constitutional:       General: She is not in acute distress.  HENT:      Head: Normocephalic and atraumatic.   Cardiovascular:      Rate and Rhythm: Normal rate.      Heart sounds: No murmur heard.    No gallop.   Pulmonary:      Effort: No respiratory distress.      Breath sounds: No wheezing or rales.   Abdominal:      General: There is no distension.      Tenderness: There is no abdominal tenderness.      Hernia: A hernia is present.   Musculoskeletal:         General: Deformity present.      Right lower leg: Edema present.      Left lower leg: Edema present.   Skin:     Findings: Lesion present.   Neurological:      General: No focal deficit present.      Mental Status: She is oriented to person, place, and time.         Labs:   No results found for this or any previous visit (from the past 48 hour(s)).       Imaging:    Unavailable.     Path:   Final Pathologic Diagnosis:  SUBSEROSAL NODULE FROM THE STOMACH:   GASTROINTESTINAL STROMAL TUMOR    Microscopic Exam:  The spindle cell lesion has an appearance which could be seen a schwannoma or in a gastrointestinal stromal tumor.  S100 positivity is not identified as would typically be " seen with a schwannoma.  On the other hand, the tumor is CD34 and  positive as would be seen in a gastrointestinal stromal tumor.   This tumor is not hypercellular.  Necrosis is not a feature.  High grade   nuclear atypia is not a feature.  Frequent mitoses are not identified.  The   proliferation extends to multiple edges of the specimen.  It is my impression that the surgical procedure was not that of a formal neoplastic resection, and therefore this report does not include a synoptic report.  If this procedure was a formal neoplastic resection, then a synoptic report can be provided.     Assessment:       1. Gastrointestinal stromal tumor (GIST) of stomach    2. Benign gastrointestinal stromal tumor (GIST)        Plan:     # GIST of stomach.   72 y.o. F patient with HTN, gastroparesis, scleroderma c/w ILD, pHTN, Raynaud's, absent esophageal contractility, with h/o hiatal hernia with GERD and non-dysplastic Pagan's esophagus who presents with incidental diagnosis of gastric GIST after undergoing pyloromyotomy.    Gross description of the nodule measures it 0.5 x 0.4 x 0.3 cm. There was no necrosis, or frequent mitosis. The proliferation was extending to the margins.     We have discussed the nature of GIST tumors with patient today. She has a gastric, <1 cm lesion, with no mitosis identified. This confers very low risk of recurrence even if margins may be positive.  At this point, we don't recommend further resection, work-up, or surveillance scans giving the very low risk of recurrence. She can continue with regular F/U with her GI and surgery doctors.   If she were to develop either symptomatic or incidental recurrence, we would be happy to see her back to discuss further management options.    # Scleroderma with ILD  Continue management per specialists.    # HTN  BP mildly elevated today.  Continue current management.    - Follow up: PRN      The above information has been reviewed with the patient and  all questions have been answered to their apparent satisfaction.  They understand that they can call the clinic with any questions.    The above was discussed and staffed with Dr. Peter Ramsey, PGY VI  Hematology/Oncology  [facility]

## 2023-04-28 ENCOUNTER — TELEPHONE (OUTPATIENT)
Dept: GASTROENTEROLOGY | Facility: CLINIC | Age: 73
End: 2023-04-28
Payer: MEDICARE

## 2023-04-28 ENCOUNTER — OFFICE VISIT (OUTPATIENT)
Dept: PAIN MEDICINE | Facility: CLINIC | Age: 73
End: 2023-04-28
Payer: MEDICARE

## 2023-04-28 VITALS
BODY MASS INDEX: 25.64 KG/M2 | SYSTOLIC BLOOD PRESSURE: 130 MMHG | HEIGHT: 65 IN | DIASTOLIC BLOOD PRESSURE: 70 MMHG | HEART RATE: 78 BPM | WEIGHT: 153.88 LBS

## 2023-04-28 DIAGNOSIS — M34.89 CUTANEOUS SCLERODERMA: ICD-10-CM

## 2023-04-28 DIAGNOSIS — G89.4 CHRONIC PAIN DISORDER: ICD-10-CM

## 2023-04-28 DIAGNOSIS — M79.18 MYOFASCIAL PAIN: ICD-10-CM

## 2023-04-28 DIAGNOSIS — G60.9 IDIOPATHIC NEUROPATHY: ICD-10-CM

## 2023-04-28 PROCEDURE — 99999 PR PBB SHADOW E&M-EST. PATIENT-LVL IV: CPT | Mod: PBBFAC,,, | Performed by: NURSE PRACTITIONER

## 2023-04-28 PROCEDURE — 99999 PR PBB SHADOW E&M-EST. PATIENT-LVL IV: ICD-10-PCS | Mod: PBBFAC,,, | Performed by: NURSE PRACTITIONER

## 2023-04-28 PROCEDURE — 99214 OFFICE O/P EST MOD 30 MIN: CPT | Mod: PBBFAC | Performed by: NURSE PRACTITIONER

## 2023-04-28 PROCEDURE — 99213 PR OFFICE/OUTPT VISIT, EST, LEVL III, 20-29 MIN: ICD-10-PCS | Mod: S$PBB,,, | Performed by: NURSE PRACTITIONER

## 2023-04-28 PROCEDURE — 99213 OFFICE O/P EST LOW 20 MIN: CPT | Mod: S$PBB,,, | Performed by: NURSE PRACTITIONER

## 2023-04-28 RX ORDER — NABUMETONE 750 MG/1
750 TABLET, FILM COATED ORAL NIGHTLY
Qty: 30 TABLET | Refills: 3 | Status: SHIPPED | OUTPATIENT
Start: 2023-04-28 | End: 2023-06-19

## 2023-04-28 RX ORDER — PREGABALIN 300 MG/1
300 CAPSULE ORAL 2 TIMES DAILY
Qty: 60 CAPSULE | Refills: 6 | Status: SHIPPED | OUTPATIENT
Start: 2023-04-28 | End: 2023-07-07 | Stop reason: SDUPTHER

## 2023-04-28 RX ORDER — TIZANIDINE 4 MG/1
4 TABLET ORAL NIGHTLY PRN
Qty: 30 TABLET | Refills: 3 | Status: SHIPPED | OUTPATIENT
Start: 2023-04-28 | End: 2023-05-31 | Stop reason: SDUPTHER

## 2023-04-28 NOTE — TELEPHONE ENCOUNTER
----- Message from Aga Villarreal sent at 4/28/2023 12:51 PM CDT -----  Regarding: PA APPROVED  Hello,    The prior authorization for the attached patient's (ACIPHEX 20MG) has been APPROVED until (4/25/2024).    Thanks,  Femi Villarreal [facility]  Prior Authorization  Medication Access Specialist

## 2023-04-28 NOTE — PROGRESS NOTES
Chronic Pain - Follow Up      Referring Physician: No ref. provider found    Chief Complaint:   Chief Complaint   Patient presents with    Neck Pain        SUBJECTIVE: Disclaimer: This note has been generated using voice-recognition software. There may be typographical errors that have been missed during proof-reading    Interval History 4/28/2023:  The patient returns to clinic today for follow up of neck and foot pain. Since last visit, she has had pneumonia. She also had hiatal hernia repair. She continues to report bilateral foot pain. This is burning in nature. Her pain is worse at night. She reports intermittent neck pain. She continues to take Lyrica, Zanaflex, and Relafen with benefit. She also takes Tylenol #3 for severe pain. She denies any adverse effects. She denies any other health changes. Her pain today is 4/10.    Interval History 12/30/2022:  The patient returns to clinic today for follow up of neck and foot pain. She reports increased foot pain over the last 2 months. She continues to report ulcers to her feet. She reports bilateral foot pain. Her pain is worse at night. She reports intermittent neck pain. She is taking Lyrica, Zanaflex, and Relafen with benefit. She also takes Tylenol #3 with benefit. She denies any adverse effects. She denies any other health changes. Her pain today is 6/10.    Interval History 8/26/2022:  The patient returns to clinic today for follow up neck and foot pain. She reports increased pain over the last few days. She attributes this as she is out of Lyrica. She continues to report bilateral foot pain. She continues to have wounds. She continues to perform wound care. She continues to report intermittent neck pain. She continues to take Lyrica, Zanaflex, and Relafen with benefit. She continues to take Tylenol #3 for severe pain as needed with benefit. She denies any adverse effects. She denies any other health changes. Her pain today is 4/10.    Interval History  5/26/2022:  The patient returns to clinic today for follow up of neck and foot pain via virtual visit. She continues to report bilateral foot pain. She continues to report ulcers to her feet. She continues to report neck pain with intermittent radiating pain into her arms. She continues to report benefit with current medications. She is taking Lyrica, Zanaflex, and Relafen. She also takes Tylenol #3 with benefit. She denies any adverse effects with these medications. She continues to perform a home exercise routine. She denies any other health changes.     Interval History 2/15/22  Patient presents in follow up. Was previously Dr. King patient with primary complaints of neck and foot pain. She reports her pain has been stable since her last visit. She did have covid in January and stopped all of ehr pain medications. She got an antibody infusion. She has fully recovered. She restarted her pain medications and reports that they are effective. She is taking Tylenol 3 daily, nambumetome 750 mg BID, lyrica 150 mg BID and Zanaflex 4 mg TID. She did do PT for her neck November to January per her report. She continues with mild neck pain without radiation into the arms or hands. Pain is worse with sidebending and rotation to the right and better with rest and medications. She denies any numbness tingling weakness or b/b incontinence.    Interval History 1/4/2022:  The patient returns to clinic today for follow up of foot pain via virtual visit. She continues to report foot pain and wounds. She continues to follow up with podiatry and wound care. She reports increased neck pain. This pain is tight and aching in nature. She denies any radicular arm pain. She is currently taking Relafen, Lyrica, and Tylenol #3 with benefit and without adverse effects. She reports limited benefit with Zanaflex. She denies any other health changes.     Interval History 12/8/2021:  The patient returns to clinic today for foot pain via virtual  visit. She continues to report foot pain. She does have foot wounds. She recently saw Dr. Claudio in [facility]. He does not recommend any vascular interventions at this time. She continues to follow up with Podiatry and wound care. She continues to take Zanaflex, Relafen, Lyrica and Tylenol #3 with benefit. She denies any adverse effects. She denies any other health changes. Her pain today is 7/10.    Interval History 11/8/2021:  The patient returns to clinic today for follow up of foot pain via virtual visit. At last visit, she was starting on Tylenol #3. She does find benefit with this. She sometimes breaks this in half. She continues to take Zanaflex, Relafen, and Lyrica. She continues to report bilateral foot pain and ulcers. She continues to follow up with podiatry. Her pain is worse at night. She denies any other health changes. Her pain today is 7/10.    Interval History 10/20/2021:  The patient returns to clinic today for follow up of foot pain. At last visit, she was started on Tramadol. She did have relief but had significant side effects. She experienced sedation, itching, headaches, and decreased appetite. This has resolved after stopping the medication. She continues to report foot pain. This pain is worse at night. She continues to follow up with podiatry. She continues to take Tizanidine, Relafen, and Lyrica with some benefit. She denies any adverse effects. She denies any other health changes. Her pain today is 8/10.    Interval history 10/06/2021:  Since previous encounter the patient continues to have nonhealing wound has been followed up with wound care and is scheduled to follow with Rheumatology for the wounds in her legs she was referred to podiatry with stated that they have done nothing different me that she with doing on her own.  She continues to have neck pain and bilateral foot pain.  She has been taking tizanidine Relafen Tylenol p.m. and Lyrica 600 mg per day.  She states that all these  medications are helpful.    Interval History 6/3/2021:  The patient returns to clinic today for follow up of foot pain. She continues to report left foot pain secondary to ulcer. She is seeing Wound Care. She describes this pain as burning in nature. Her pain is worse with prolonged activity. She reports intermittent neck pain. She continues to take Zanaflex. She reports limited benefit with increased Lyrica dose. She denies any other health changes. Her pain today is 5/10.    Interval History 5/5/2021:  The patient returns to clinic today for follow up of foot pain. She reports a new ulcer on her left foot. She is seeing Wound Care. She reports increased pain with this new ulcer. She describes this pain as burning. She continues to report neck pain that is tight and aching. She denies any radicular arm pain. Her pain is worse with prolonged activity, especially turning her head to the side. She continues to perform a home exercise routine. She continues to take Lyrica and Zanaflex with benefit. She denies any other health changes. Her pain today is 5/10.    Interval History 2/8/2021:  The patient returns to clinic today for follow up of neck and foot pain. She reports that her neck pain has significantly improved since last visit. She has recently completed physical therapy for this. She is performing a home stretching routine. She reports intermittent neck pain that is tight and aching in nature. She denies any radicular arm pain. She continues to report bilateral foot pain. This is worse at night. She continues to take Lyrica and Zanaflex with benefit. She denies any other health changes. Her pain today is 4/10.    Interval History 1/8/2021:  The patient returns to clinic today for follow up of neck and foot pain. She continues to report neck pain that is tight and aching in nature. She denies any radicular pain. She continues to participate in physical therapy and a home exercise routine. She continues to report  bilateral foot pain, worse at night. She reports that increased Lyrica dose has provided improved benefit. She also takes Zanaflex with benefit. She denies any other health changes. Her pain today is 3/10.    Interval History 11/13/2020:  The patient returns to clinic today for follow up of neck and foot pain. She continues to report bilateral foot pain. Since last visit, she had a venous ablation procedure on her right leg through Vascular. She is scheduled for the left side in the next month. She continues to report bilateral foot pain, worse at night. She continues to report neck pain that is tight and aching in nature. She denies any radicular pain. Her pain is worse flexion and turning her head to the side. She is currently participating in physical therapy with some benefit. She continues to take Lyrica but does ask if this could be increased. She continues to take Zanaflex with benefit. She denies any other health changes. Her pain today is 5/10    Interval History 10/2/2020:  The patient returns to clinic today for follow up of foot pain. She reports increased bilateral foot pain over the last few months. This pain is burning in nature. This pain is worse at night. She continues to have ulcers to her feet related to her scleroderma. She would like to restart the Lyrica. She also reports increased neck pain that is sore and aching in nature. She denies any radiating arm pain. This is worse with turning her head and at night. She denies any other health changes. Her pain today is 7/10.    Interval History 6/5/2020:  The patient requests audio visit today for follow up of bilateral foot pain. She reports intermittent foot pain that is tolerable at this time. She has discontinued Lyrica as of April. She continues to have ulcers to her feet. She does have wound care. She denies any other health changes.     Interval History 1/17/2020:  The patient returns to clinic today for follow up. She continues to report  bilateral foot pain. She describes this pain as burning and tingling in nature. At last visit, we decreased her Lyrica dose to 100 mg at night. She reports increased pain since then. She would like to go to back to the 150 mg dose. She continues to have ulcers to her feet, left worse than right. She continues to participate in a home wound care program. She denies any other health changes. Her pain today is 6/10.    Interval History 12/17/2019:  The patient returns to clinic today for follow up. She reports improving foot pain. She reports improved healing ulcers to her left foot. She continues to report right foot pain that is burning and tingling in nature. She continues to participate in a home wound care routine. She continues to take Lyrica. She asks about decreasing this. She denies any other health changes. Her pain today is 5/10.    Interval History 9/17/2019:  The patient returns to clinic today for follow up. She continues to report bilateral foot pain that is burning and tingling in nature. Her pain is worse with sitting and at night. She continues to have slow healing ulcers to both feet. She continues to perform a home wound care program. She is no longer taking Gabapentin which was previously called in. She is now taking Pregabalin generic with benefit. She denies any other health changes. Her pain today is 4/10.    Interval History 6/13/2019:  The patient returns to clinic for follow up for bilateral foot pain. She continues to report bilateral foot pain that is burning in nature. She continues to have slow healing wounds to both feet from ulcers. She continues to take Lyrica once daily but reports increased pain. She continues to take Vitamin B12. She denies any other health changes. Her pain today is 8/10.    Interval History 3/13/2019:  The patient returns to clinic today for follow up. She continues to report bilateral foot pain that is burning and tingling in nature. Her pain is worse with  prolonged walking and standing. She continues to have bilateral foot wounds. She reports that these wounds have significantly improved since last visit. She is currently taking Vitamin B12 with benefit. She continues to report benefit with Lyrica. She is currently taking this once daily. She denies any other health changes. Her pain today is 5/10.    Interval History 2/6/2019:  The patient returns to clinic today for follow up. She reports that her bilateral foot wounds have significantly improved since last visit. She does report some significant improvement in her foot pain. She reports intermittent bilateral foot pain especially with prolonged walking. She describes this pain as heavy and tingling in nature. She is no longer taking Celebrex. She is currently taking Lyrica 150 mg BID with benefit. She does report that the Lyrica is expensive for her. She denies any other health changes. Her pain today is 5/10.    Interval History 12/5/18:  Patient returns to clinic today for follow up. She reports that since increasing her medications, she is having significant improvement in pain during the day time. She is currently taking Lyrica 75mg TID and Celebrex 200mg TID. However, she states that the burning  And tingling pain in both her feet increase in the evening around 6 or 7pm. She is unable to sleep during the nights due to the pain. She is still wearing her bilateral boots due to non healing ulcers from her scleroderma.     Interval History 8/30/2018:  The patient returns to clinic today for follow up. She continues to report bilateral foot pain that is burning and tingling in nature. She reports that this pain is worse at night. She is currently taking Lyrica 75 mg BID with limited relief. She did not have any benefit with Mobic. She continues to take Aleve with some benefit. She continues to have nonhealing ulcers. She wears an ACE bandage to her right calf, as well as boots to her bilateral feet. She denies any  other health changes. Her pain today is 7/10.     Interval History 7/11/2018:  Since previous encounter she has weaned from gabapentin to 600mg / day and did not notice significant worsening of pain, but was having somnelence from higher doses of the medication in the past.  We are weaning in an attempt to trial Lyrica as an alternative to gabapentin.  Tramadol causes significant sedation, limiting its use.  She has discontinued the use of the tramadol.  Additionally she does have benefit from anti-inflammatory medication, has been using aleve, has not trialed CANTRELL-2 inhibitors in the past.    Interval history 05/16/2018:      The patient has had x-rays of the lumbar spine which did not reveal any evidence of significant neuroforaminal stenosis with degenerative disc disease.  There is mild facet arthropathy.  She has escalated her gabapentin and is currently taking 2100 mg of gabapentin per day without any noticeable improvement but daytime somnolence.  She continues to have nonhealing ulcers and topical pain cream is helping to a limited degree over her leg in areas where there is no skin disruption.    Initial encounter:    Yamel Shah presents to the clinic for the evaluation of foot pain. The pain started 2 years ago following ulcers and symptoms have been worsening.    Brief history: History of scleroderma    Pain Description:    The pain is located in the both feet in the area of slowly healing chronic foot ulcers which were partly from venous insufficiency and stasis associated with her scleroderma.  In her right lower extremity she describes radicular symptoms in the L4/5 distribution.    At BEST  5/10     At WORST  10/10 on the WORST day.      On average pain is rated as 8/10.     Today the pain is rated as 8/10    The pain is described as burning, sharp, shooting and constant       Symptoms interfere with daily activity, sleeping and work.     Exacerbating factors: Laying, Walking, Night  Time, Morning and Getting out of bed/chair.      Mitigating factors medications.     Patient denies night fever/night sweats, urinary incontinence, bowel incontinence, significant weight loss, significant motor weakness and loss of sensations.  Patient denies any suicidal or homicidal ideations    Pain Medications:  Current:  Lyrica 300 mg daily  Zanaflex  Relafen  Tylenol #3    Tried in Past:  NSAIDs -Aleve, Mobic, Celebrex  TCA -Never  SNRI -Never  Anti-convulsants - Gabapentin, Lyrica  Muscle Relaxants -Never  Opioids-Tramadol    Physical Therapy/Home Exercise: no       report:  Reviewed and consistent with medication use as prescribed.    Pain Procedures: none    Chiropractor -never  Acupuncture - never  TENS unit -never  Spinal decompression -never  Joint replacement -never    Imaging:   Xray Cervical Spine 12/6/2019:  FINDINGS:  Odontoid prevertebral soft tissues and posterior elements are intact.  Neural foramina are patent.  No fracture dislocation bone destruction seen.  There is mild DJD.     Impression:     Mild DJD.    X-ray lumbar spine 6/1/2016:  2 views: Alignment is normal. There is mild DJD. No fracture dislocation bone destruction seen.   Impression      Mild DJD.     Xray lumbar spine 4/2018:  COMPARISON:  June 2016    FINDINGS:  There is slight curvature of the lumbar spine.  The vertebral bodies are normally aligned and normal in height.  Mild disc space narrowing present at L5-S1.  There is mild facet degenerative change in the lower spine.  No significant osteophytic spurring present.  There is no change in alignment with flexion or extension.      Past Medical History:   Diagnosis Date    Abnormal Pap smear     Acid reflux     Allergy     Arthritis     Encounter for blood transfusion     GERD (gastroesophageal reflux disease)     Hiatal hernia 3/24/2023    History of migraine headaches     Hx of colonic polyp     Hypertension     Idiopathic neuropathy 7/20/2012    ILD (interstitial lung  disease) 11/6/2013    Iron deficiency anemia 3/18/2014    MRSA carrier     Osteopenia     Pneumonia     Pulmonary fibrosis     Pulmonary hypertension     Raynaud's disease     Scleroderma, diffuse     Sjogren's syndrome     Vitamin D deficiency 11/14/2013     Past Surgical History:   Procedure Laterality Date    24 HOUR IMPEDANCE PH MONITORING OF ESOPHAGUS IN PATIENT NOT TAKING ACID REDUCING MEDICATIONS N/A 3/4/2020    Procedure: IMPEDANCE PH STUDY, ESOPHAGEAL, 24 HOUR, IN PATIENT NOT TAKING ACID REDUCING MEDICATION;  Surgeon: Annamaria Mendoza MD;  Location: [facility] (4TH FLR);  Service: Endoscopy;  Laterality: N/A;  OFF PPI/H2 Blocker   Motility Studies   Hold Narcotics x 1 days   Hold TCA x 1 days  2/26 - LVM attempting to confirm appt  2/27 - Confirmed appt    ANORECTAL MANOMETRY N/A 5/10/2021    Procedure: MANOMETRY, ANORECTAL with balloon expulsion test;  Surgeon: Annamaria Mendoza MD;  Location: [facility] (4TH FLR);  Service: Endoscopy;  Laterality: N/A;  order combined  covid test 5/7 [facility], instructions emailed-[facility]    BREAST BIOPSY      Left, benign    CERVICAL CONIZATION   W/ LASER  1970    COLONOSCOPY      COLONOSCOPY N/A 3/29/2019    Procedure: COLONOSCOPY;  Surgeon: Annamaria Mendoza MD;  Location: [facility] (2ND FLR);  Service: Endoscopy;  Laterality: N/A;    COLONOSCOPY N/A 5/10/2021    Procedure: COLONOSCOPY;  Surgeon: Annamaria Mendoza MD;  Location: [facility] (4TH FLR);  Service: Endoscopy;  Laterality: N/A;  2nd floor-previous scopes done on 2nd floor, gastroparesis  full liquid diet x2 days, clear liquid x1 day prior to procedure  covid test 5/7 [facility], instructions emailed-vt  5/6 pt confirmed [id number]    DILATION AND CURETTAGE OF UTERUS      ESOPHAGEAL MANOMETRY WITH MEASUREMENT OF IMPEDANCE N/A 3/4/2020    Procedure: MANOMETRY, ESOPHAGUS, WITH IMPEDANCE MEASUREMENT;  Surgeon: Annamaria Mendoza MD;  Location: [facility] (4TH FLR);  Service: Endoscopy;  Laterality: N/A;  OFF PPI/H2 Blocker    Motility Studies   Hold Narcotics x 1 days   Hold TCA x 1 days    ESOPHAGOGASTRODUODENOSCOPY      ESOPHAGOGASTRODUODENOSCOPY N/A 3/29/2019    Procedure: EGD (ESOPHAGOGASTRODUODENOSCOPY);  Surgeon: Annamaria Mendoza MD;  [facility]  Service: Endoscopy;  Laterality: N/A;  pulmonary htn    ESOPHAGOGASTRODUODENOSCOPY N/A 2/2/2021    Procedure: ESOPHAGOGASTRODUODENOSCOPY (EGD);  Surgeon: Annamaria Mendoza MD;  Location: [facility] (2ND FLR);  Service: Endoscopy;  Laterality: N/A;  2nd floor gastroparesis  3 days full liquid diet and 1 day clears  covid test 1/30 primary care, instructions sent to [facility]    ESOPHAGOGASTRODUODENOSCOPY N/A 7/1/2022    Procedure: ESOPHAGOGASTRODUODENOSCOPY (EGD);  Surgeon: Annamaria Mendoza MD;  [facility]  Service: Endoscopy;  Laterality: N/A;  2nd floor-gastroparesis  full liquid diet x3 days, clear liquid diet x1 day prior to procedure  fully vaccinated, instructions sent to [email or web address]  6/29-pt confirmed new arrival time-[facility]    EXCISION, LESION, STOMACH, LAPAROSCOPIC N/A 3/23/2023    Procedure: XI ROBOTIC EXCISION, LESION, STOMACH;  Surgeon: Katherine Segura MD;  Location: [facility] OR [address] [place] [address];  Service: General;  Laterality: N/A;    HYSTERECTOMY  1990    FRANDY (AUB, Fibroids), ovaries remain    REPAIR, HERNIA, UMBILICAL N/A 3/23/2023    Procedure: REPAIR, HERNIA, UMBILICAL;  Surgeon: Katherine Segura MD;  Location: [facility] OR [address] [place] [address];  Service: General;  Laterality: N/A;    RIGHT HEART CATHETERIZATION Right 1/5/2021    Procedure: INSERTION, CATHETER, RIGHT HEART;  Surgeon: Aureliano Sy MD;  Location: [facility] CATH LAB;  Service: Cardiology;  Laterality: Right;    RIGHT HEART CATHETERIZATION Right 3/16/2023    Procedure: INSERTION, CATHETER, RIGHT HEART;  Surgeon: Aureliano Sy MD;  Location: [facility] CATH LAB;  Service: Cardiology;  Laterality: Right;    ROBOT-ASSISTED REPAIR OF HIATAL HERNIA USING DA VERNELL XI N/A  3/23/2023    Procedure: XI ROBOTIC REPAIR, HERNIA, HIATAL;  Surgeon: Katherine Segura MD;  Location: [facility] OR 2ND FLR;  Service: General;  Laterality: N/A;    VARICOSE VEIN SURGERY      XI ROBOTIC GASTROPEXY N/A 3/23/2023    Procedure: XI ROBOTIC GASTROPEXY;  Surgeon: Katherine Segura MD;  Location: [facility] OR 2ND FLR;  Service: General;  Laterality: N/A;    XI ROBOTIC PYLOROMYOTOMY N/A 3/23/2023    Procedure: XI ROBOTIC PYLOROMYOTOMY;  Surgeon: Katherine Segura MD;  Location: [facility] OR 2ND FLR;  Service: General;  Laterality: N/A;     Social History     Socioeconomic History    Marital status:      Spouse name: Lewis    Number of children: 4   Occupational History    Occupation: -retired     Employer: [employer]     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 No    Breast-feeding No   Social History Narrative         Social Determinants of Health     Financial Resource Strain: Low Risk     Difficulty of Paying Living Expenses: Not hard at all   Food Insecurity: No Food Insecurity    Worried About Running Out of Food in the Last Year: Never true    Ran Out of Food in the Last Year: Never true   Transportation Needs: No Transportation Needs    Lack of Transportation (Medical): No    Lack of Transportation (Non-Medical): No   Physical Activity: Inactive    Days of Exercise per Week: 0 days    Minutes of Exercise per Session: 0 min   Stress: No Stress Concern Present    Feeling of Stress : Only a little   Social Connections: Moderately Isolated    Frequency of Communication with Friends and Family: More than three times a week    Frequency of Social Gatherings  with Friends and Family: Never    Attends Baptism Services: Never    Active Member of Clubs or Organizations: No    Attends Club or Organization Meetings: Never    Marital Status:    Housing Stability: Low Risk     Unable to Pay for Housing in the Last Year: No    Number of Places Lived in the Last Year: 1    Unstable Housing in the Last Year: No     Family History   Problem Relation Age of Onset    Breast cancer Mother     Hypertension Father     Breast cancer Sister     Diabetes Sister     Osteoarthritis Brother     Diabetes Brother     No Known Problems Daughter     No Known Problems Daughter     No Known Problems Son     No Known Problems Son     Breast cancer Maternal Aunt     Melanoma Neg Hx     Colon cancer Neg Hx     Crohn's disease Neg Hx     Stomach cancer Neg Hx     Ulcerative colitis Neg Hx     Rectal cancer Neg Hx     Irritable bowel syndrome Neg Hx     Esophageal cancer Neg Hx     Celiac disease Neg Hx     Ovarian cancer Neg Hx     Liver cancer Neg Hx     Pancreatic cancer Neg Hx        Review of patient's allergies indicates:   Allergen Reactions    Sulfa (sulfonamide antibiotics)      Other reaction(s): Rash       Current Outpatient Medications   Medication Sig    acetaminophen-codeine 300-30mg (TYLENOL #3) 300-30 mg Tab Take 1 tablet by mouth every 6 (six) hours as needed (pain).    albuterol (VENTOLIN HFA) 90 mcg/actuation inhaler Inhale 2 puffs into the lungs every 4 (four) hours as needed for Wheezing. Rescue    carboxymethylcellulose sodium (REFRESH TEARS) 0.5 % Drop Apply 1-2 drops to every as needed    ergocalciferol (ERGOCALCIFEROL) 50,000 unit Cap Take 1 capsule (50,000 Units total) by mouth every 7 days.    ferrous sulfate 325 (65 FE) MG EC tablet Take 325 mg by mouth every morning.    flu vac 2022 [id number], (FLUAD QUAD 2022-23,65Y UP,,PF,) 60 mcg (15 mcg x 4)/0.5 mL Syrg Inject into the muscle.    multivitamin capsule Take 1 capsule by mouth once daily.    mycophenolate  mofetil (CELLCEPT) 200 mg/mL SusR Take 5 mLs (1,000 mg total) by mouth 2 (two) times daily.    nabumetone (RELAFEN) 750 MG tablet Take 1 tablet (750 mg total) by mouth 2 (two) times daily as needed for Pain. (Patient taking differently: Take 750 mg by mouth every evening.)    NIFEdipine (ADALAT CC) 90 MG TbSR TAKE 1 TABLET(90 MG) BY MOUTH EVERY DAY (Patient taking differently: Take by mouth nightly. TAKE 1 TABLET(90 MG) BY MOUTH EVERY DAY (TAKES NIGHTLY WITH 30 MG TABLET TOTAL 120 MG))    NIFEdipine (PROCARDIA-XL) 30 MG (OSM) 24 hr tablet TAKE 1 TABLET(30 MG) BY MOUTH EVERY DAY (Patient taking differently: Take by mouth nightly. TAKE WITH 90 MG TABLET EVERY NIGHT (TOTAL 120 MG))    pravastatin (PRAVACHOL) 20 MG tablet TAKE 1 TABLET(20 MG) BY MOUTH EVERY EVENING    pregabalin (LYRICA) 300 MG Cap Take 1 capsule (300 mg total) by mouth 2 (two) times daily. (Patient taking differently: Take 300 mg by mouth every evening.)    PREVIDENT 5000 BOOSTER PLUS 1.1 % Pste SMARTSIG:To Teeth    PREVIDENT 5000 SENSITIVE 1.1-5 % Pste BRUSH FOR 2 MINUTES TWICE PER DAY    RABEprazole (ACIPHEX) 20 mg tablet Take 1 tablet (20 mg total) by mouth 2 (two) times daily.    sars-cov-2, covid-19, (MODERNA COVID-19) 50 mcg/0.25 ml injection (BOOSTER) Inject into the muscle.    tadalafil (ADCIRCA) 20 mg Tab Take 2 tablets (40 mg total) by mouth once daily. (Patient taking differently: Take 40 mg by mouth nightly.)    tiZANidine (ZANAFLEX) 4 MG tablet Take 1 tablet (4 mg total) by mouth nightly as needed (muscle pain).     No current facility-administered medications for this visit.       REVIEW OF SYSTEMS:    GENERAL:  No weight loss, malaise or fevers.  HEENT:   No recent changes in vision or hearing  NECK:  Negative for lumps,  difficulty with swallowing associated with scleroderma.  RESPIRATORY:  Negative for cough, wheezing or shortness of breath, patient denies any recent URI. Albuterol (history of ILD)  CARDIOVASCULAR:  Negative for chest  "pain, leg swelling or palpitations.  GI:  Negative for abdominal discomfort, blood in stools or black stools or change in bowel habits. GERD controlled zantac  MUSCULOSKELETAL:  See HPI.  SKIN:   Chronic low healing venous stasis ulcerations to bilateral lower extremities   PSYCH:  No mood disorder or recent psychosocial stressors.  Patients sleep is not disturbed secondary to pain.  HEMATOLOGY/LYMPHOLOGY:   History of iron deficiency anemia, takes daily iron supplementation.    ENDO: No history of diabetes or thyroid dysfunction  NEURO:   No history of headaches, syncope, paralysis, seizures or tremors.  All other reviewed and negative other than HPI.    OBJECTIVE:    /70 (BP Location: Left arm, Patient Position: Sitting, BP Method: Medium (Automatic))   Pulse 78   Ht 5' 5" (1.651 m)   Wt 69.8 kg (153 lb 14.1 oz)   BMI 25.61 kg/m²     PHYSICAL EXAMINATION:    GENERAL: Well appearing, in no acute distress, alert and oriented x3.  PSYCH:  Mood and affect appropriate.  SKIN: Tight skin associated with scleroderma. Wearing shoes today.   HEAD/FACE:  Normocephalic, atraumatic. Cranial nerves grossly intact.  CV: RRR with palpation of the radial artery.  PULM: No evidence of respiratory difficulty, symmetric chest rise.  EXTREMITIES: Contractures over on bilateral hands with ulcerations to knuckles, right greater than left.   MUSCULOSKELETAL:   Bilateral lower extremity strength testing limited secondary to pain in the feet.    NEURO:  Decreased sensation to BLE.   GAIT: Antalgic, ambulates without assistance       ASSESSMENT: 72 y.o. year old female with pain, consistent with the following:    Encounter Diagnoses   Name Primary?    Chronic pain disorder     Cutaneous scleroderma     Myofascial pain            PLAN:     - Previous imaging reviewed today. Labs reviewed.      - Continue Lyrica 300 mg BID. Refill provided.     - Continue Relafen. Refill provided.     - Continue Zanaflex, refill provided.     - " Pain Contract signed 8/26/2022.     - Continue Tylenol #3 once daily PRN. Refill already sent.     - The patient is here today for a refill of current pain medications and they believe these provide effective pain control and improvements in quality of life.  The patient notes no serious side effects, and feels the benefits outweigh the risks.  The patient was reminded of the pain contract that they signed previously as well as the risks and benefits of the medication including possible death.  The updated [place] Board  Pharmacy prescription monitoring program was reviewed, and the patient has been found to be compliant with current treatment plan. Medication management provided by Dr. Hinson.     - Previous UDS reviewed.      - Continue home exercise routine.     - RTC in 3 months or sooner if needed.      The above plan and management options were discussed at length with patient. Patient is in agreement with the above and verbalized understanding.     Viktoriya Camara NP  04/28/2023

## 2023-05-01 RX ORDER — TADALAFIL 20 MG/1
40 TABLET ORAL DAILY
Qty: 28 TABLET | Refills: 11 | Status: SHIPPED | OUTPATIENT
Start: 2023-05-01 | End: 2023-05-08 | Stop reason: SDUPTHER

## 2023-05-02 ENCOUNTER — OFFICE VISIT (OUTPATIENT)
Dept: GASTROENTEROLOGY | Facility: CLINIC | Age: 73
End: 2023-05-02
Payer: MEDICARE

## 2023-05-02 ENCOUNTER — TELEPHONE (OUTPATIENT)
Dept: GASTROENTEROLOGY | Facility: CLINIC | Age: 73
End: 2023-05-02

## 2023-05-02 ENCOUNTER — OFFICE VISIT (OUTPATIENT)
Dept: ALLERGY | Facility: CLINIC | Age: 73
End: 2023-05-02
Payer: MEDICARE

## 2023-05-02 ENCOUNTER — LAB VISIT (OUTPATIENT)
Dept: LAB | Facility: HOSPITAL | Age: 73
End: 2023-05-02
Attending: INTERNAL MEDICINE
Payer: MEDICARE

## 2023-05-02 VITALS
HEIGHT: 65 IN | DIASTOLIC BLOOD PRESSURE: 65 MMHG | BODY MASS INDEX: 25.56 KG/M2 | WEIGHT: 153.44 LBS | HEART RATE: 71 BPM | OXYGEN SATURATION: 100 % | SYSTOLIC BLOOD PRESSURE: 125 MMHG

## 2023-05-02 VITALS — HEIGHT: 65 IN | WEIGHT: 153.44 LBS | BODY MASS INDEX: 25.56 KG/M2

## 2023-05-02 DIAGNOSIS — K21.9 GASTROESOPHAGEAL REFLUX DISEASE, UNSPECIFIED WHETHER ESOPHAGITIS PRESENT: Primary | ICD-10-CM

## 2023-05-02 DIAGNOSIS — D64.9 ANEMIA, UNSPECIFIED TYPE: ICD-10-CM

## 2023-05-02 DIAGNOSIS — R68.81 EARLY SATIETY: ICD-10-CM

## 2023-05-02 DIAGNOSIS — R13.10 DYSPHAGIA, UNSPECIFIED TYPE: ICD-10-CM

## 2023-05-02 DIAGNOSIS — K59.00 CONSTIPATION, UNSPECIFIED CONSTIPATION TYPE: ICD-10-CM

## 2023-05-02 DIAGNOSIS — B99.9 RECURRENT INFECTIONS: ICD-10-CM

## 2023-05-02 LAB
BASOPHILS # BLD AUTO: 0.03 K/UL (ref 0–0.2)
BASOPHILS NFR BLD: 0.5 % (ref 0–1.9)
DIFFERENTIAL METHOD: ABNORMAL
EOSINOPHIL # BLD AUTO: 0.1 K/UL (ref 0–0.5)
EOSINOPHIL NFR BLD: 0.9 % (ref 0–8)
ERYTHROCYTE [DISTWIDTH] IN BLOOD BY AUTOMATED COUNT: 15.4 % (ref 11.5–14.5)
FERRITIN SERPL-MCNC: 20 NG/ML (ref 20–300)
HCT VFR BLD AUTO: 40.3 % (ref 37–48.5)
HGB BLD-MCNC: 13 G/DL (ref 12–16)
IMM GRANULOCYTES # BLD AUTO: 0.02 K/UL (ref 0–0.04)
IMM GRANULOCYTES NFR BLD AUTO: 0.3 % (ref 0–0.5)
IRON SERPL-MCNC: 45 UG/DL (ref 30–160)
LYMPHOCYTES # BLD AUTO: 1.5 K/UL (ref 1–4.8)
LYMPHOCYTES NFR BLD: 23.1 % (ref 18–48)
MCH RBC QN AUTO: 30.4 PG (ref 27–31)
MCHC RBC AUTO-ENTMCNC: 32.3 G/DL (ref 32–36)
MCV RBC AUTO: 94 FL (ref 82–98)
MONOCYTES # BLD AUTO: 0.4 K/UL (ref 0.3–1)
MONOCYTES NFR BLD: 6.6 % (ref 4–15)
NEUTROPHILS # BLD AUTO: 4.5 K/UL (ref 1.8–7.7)
NEUTROPHILS NFR BLD: 68.6 % (ref 38–73)
NRBC BLD-RTO: 0 /100 WBC
PLATELET # BLD AUTO: 213 K/UL (ref 150–450)
PMV BLD AUTO: 11.3 FL (ref 9.2–12.9)
RBC # BLD AUTO: 4.28 M/UL (ref 4–5.4)
SATURATED IRON: 11 % (ref 20–50)
TOTAL IRON BINDING CAPACITY: 401 UG/DL (ref 250–450)
TRANSFERRIN SERPL-MCNC: 271 MG/DL (ref 200–375)
WBC # BLD AUTO: 6.55 K/UL (ref 3.9–12.7)

## 2023-05-02 PROCEDURE — 82728 ASSAY OF FERRITIN: CPT | Mod: TXP | Performed by: INTERNAL MEDICINE

## 2023-05-02 PROCEDURE — 99212 OFFICE O/P EST SF 10 MIN: CPT | Mod: 25,27,PBBFAC,NTX | Performed by: STUDENT IN AN ORGANIZED HEALTH CARE EDUCATION/TRAINING PROGRAM

## 2023-05-02 PROCEDURE — 99999 PR PBB SHADOW E&M-EST. PATIENT-LVL II: CPT | Mod: PBBFAC,GC,TXP, | Performed by: STUDENT IN AN ORGANIZED HEALTH CARE EDUCATION/TRAINING PROGRAM

## 2023-05-02 PROCEDURE — 99999 PR PBB SHADOW E&M-EST. PATIENT-LVL V: ICD-10-PCS | Mod: PBBFAC,TXP,, | Performed by: INTERNAL MEDICINE

## 2023-05-02 PROCEDURE — 99204 OFFICE O/P NEW MOD 45 MIN: CPT | Mod: S$PBB,GC,NTX, | Performed by: STUDENT IN AN ORGANIZED HEALTH CARE EDUCATION/TRAINING PROGRAM

## 2023-05-02 PROCEDURE — 84466 ASSAY OF TRANSFERRIN: CPT | Mod: NTX | Performed by: INTERNAL MEDICINE

## 2023-05-02 PROCEDURE — 99204 PR OFFICE/OUTPT VISIT, NEW, LEVL IV, 45-59 MIN: ICD-10-PCS | Mod: S$PBB,GC,NTX, | Performed by: STUDENT IN AN ORGANIZED HEALTH CARE EDUCATION/TRAINING PROGRAM

## 2023-05-02 PROCEDURE — 85025 COMPLETE CBC W/AUTO DIFF WBC: CPT | Mod: NTX | Performed by: INTERNAL MEDICINE

## 2023-05-02 PROCEDURE — 99214 PR OFFICE/OUTPT VISIT, EST, LEVL IV, 30-39 MIN: ICD-10-PCS | Mod: S$PBB,NTX,, | Performed by: INTERNAL MEDICINE

## 2023-05-02 PROCEDURE — 99999 PR PBB SHADOW E&M-EST. PATIENT-LVL V: CPT | Mod: PBBFAC,TXP,, | Performed by: INTERNAL MEDICINE

## 2023-05-02 PROCEDURE — 99215 OFFICE O/P EST HI 40 MIN: CPT | Mod: 25,PBBFAC,NTX | Performed by: INTERNAL MEDICINE

## 2023-05-02 PROCEDURE — 36415 COLL VENOUS BLD VENIPUNCTURE: CPT | Mod: NTX | Performed by: INTERNAL MEDICINE

## 2023-05-02 PROCEDURE — 99214 OFFICE O/P EST MOD 30 MIN: CPT | Mod: S$PBB,NTX,, | Performed by: INTERNAL MEDICINE

## 2023-05-02 PROCEDURE — 99999 PR PBB SHADOW E&M-EST. PATIENT-LVL II: ICD-10-PCS | Mod: PBBFAC,GC,TXP, | Performed by: STUDENT IN AN ORGANIZED HEALTH CARE EDUCATION/TRAINING PROGRAM

## 2023-05-02 NOTE — PROGRESS NOTES
[facility] Visit    Reason for Visit:  The primary encounter diagnosis was Gastroesophageal reflux disease, unspecified whether esophagitis present. Diagnoses of Anemia, unspecified type, Dysphagia, unspecified type, Early satiety, and Constipation, unspecified constipation type were also pertinent to this visit.    PCP: Aly Rand     Ref: Jeannette Pringle NP    HPI:This is a 72 y.o. Female with a PMH of  scleroderma, ILD, PHTN here today for the following GI symptoms:    5/26/2023:  Robotic hiatal hernia repair, pyloromyotomy, umbilical hernia repair, gastropexy, excision of lesion in the stomach (GIST)    72F with HTN, ROXANNE, gastroparesis, and scleroderma complicated by ILD, pHTN, Raynaud's, and absent esophageal contractility, with h/o hiatal hernia with GERD and non-dysplastic Pagan's esophagus s/p robotic hiatal hernia repair with gastropexy and pyloromyotomy 3/23/23, presents today with R>L inguinofemoral hernias confirmed on US 4/6/23 which was personally reviewed. Given the high risk of bowel incarceration, obstruction, and/or strangulation in the setting of femoral hernias, I recommend repair.     We discussed the anatomy and natural course of hernias and indications for repair. We discussed the technical and convalescent aspects of repair including open, laparoscopic and robotic, and the rationale for mesh. We discussed the risks, benefits and alternatives to repair, the risks including but not limited to bleeding, infection, injury to bowel or bladder, division of the round ligament which can result in groin pain, chronic groin pain, recurrence, and postop urinary retention. All questions were answered to her satisfaction and she has elected to proceed with robotic bilateral inguinofemoral hernia repair with mesh. Informed consent was obtained.   Katherine Segura MD  General Surgery  [facility] 2nd Fl       71 y/o F with incidentally found subcentimeter gastric  GIST while undergoing repair of hiatal hernia.  Given lack of high risk features including small size, location, lack of mitoses, no adjuvant therapy is indicated.  I would not at this time recommend formal surveillance either given the low risk and her other comorbid conditions.  Vernon Green MD  Hematology and Oncology  [facility] 2nd Fl    GERD.   Pyrosis. none   Regurgitation. rare  Nocturnal reflux: none     MEDs:  aciphex 20 BID  Gaviscon prn - no     Dysphagia. Few per week  Unchanged w surgery     Tolerating regular diet     Dry mouth.     Gastroparesis.   Denies nausea  Vomiting - few per month    Early satiety occasional     No longer following GP diet.   Seen by dietitian re gp diet     Abd pain. Resolved   No meds     Bloating and gas. Resolved   Consumes some lactose  Avoids artificial sugars.    Lactase deficiency  No longer following lactose free diet    Constipation Improved   [place] type 5  BM 1-2/day     Med:  No longer on motegrity  No miralax     Fecal incontinece. Occasional, minimal   Completed PT w improvement  Not interested in interstim at this time     Anemia.Resolved  No overt bleeding.   Ho IV iron  On Fe daily   Rheum labs pending     Denies diarrhea, melena, BRBPR.    Weight gain. Stable  153lb from 153      Total visit time was 31 minutes, more than 50% of which was spent in face-to-face counseling with patient regarding symptoms, diagnostic results, prognosis, risks and benefits of treatment options, instructions for management, stress reduction, coping strategies and coordination of care.        Previous Studies:  EGD 07/01/2022: Normal esophagus.  Grade a esophagitis.  Esophageal mucosal changes consistent with short-segment Pagan's.  Biopsied (intestinal metaplasia, no dysplasia).  Benign-appearing stenosis.  The stenosis was more than 13 cm in length.  The stenosis was transverse.  Z-line 38 cm.  2 cm hiatal hernia.  Normal stomach.  Sessile polyps in  the stomach (hyperplastic).  Normal duodenum (-).    Rectum manometry 05/12/2021:  Low basal sphincter pressure.  Inadequate squeeze.  Dyssynergic defecation type 2.  Hypersensitivity in rectum.  Rare present.  Patient unable to evacuate 50 cc balloon.  Colonoscopy 05/10/2021:  Good prep to cecum.  5 mm polyp in ascending colon (TA).  6 mm polyp descending colon (-).  4 2-4 mm polyps at rectosigmoid (Ta + HP).  Nonbleeding internal hemorrhoids.  Repeat in 5 years.  EGD 02/02/2021:  Normal esophagus.  Grade a esophagitis.  Z line irregular (Intestinal metaplasia).  2 cm hiatal hernia.  Erythema in the body (-).  Few gastric polyps (FG).  Normal duodenum (mild increased intraepithelial lymphocytes (see comment, Dissach: lactase deficiency).  Gastric emptying study 05/04/2020:  Delayed gastric emptying.  For hour % retention of 25%.  PH impedance 03/06/2020:  Significant acid reflux of PI.  Total % pH less than 4 was 41.  [place] 133.  Manometry 03/04/2020:  Low LES pressure with complete relaxation.  Absent contractility.  Incomplete bolus clearance.  Hiatal hernia.  Abnormal multiple rapid swallow test.  Unable to perform apple and nancy cracker swallows.  Evidence of residual liquid after 150 cc bolus  MBSS 10/31/19:Flash penetration, no aspiration.  Moderate vallecular pooling, mild Piriforms sinus pooling. The results of this MBSS indicate that patient demonstrates moderate swallowing dysfunction c/b intermittent silent penetration of thin liquids as well as moderate pharyngeal residue across consistencies. No aspiration observed. Given patient's esophageal symptoms and her history of scleroderma, prognosis for improvement of swallowing with therapy is good/ fair.   -Diet: recommend thin liquids and regular as tolerated.   -Therapeutic technique: recommend small bites/sips, alternate solid with liquid wash and multiple swallows per bolus.  -Dysphagia therapy, for 4-6 sessions over the course of 4-6 weeks, in  order to increase tongue base retraction, increase pharyngeal contraction and increase swallow safety by implementing therapeutic maneuvers.   -Repeat MBSS in 6-8 weeks for reeval after completion of dysphagia therapy.  -Contact clinician or referring provider for repeat MBSS if swallowing status changes or worsens.  -Recommend follow-up with GI.   3/29/19 EGD:dilation in the entire esophagus. irreg zline (-). 3 cm HH. Benign appearing esophageal stenosis. Nl stomach. Few gastric polyps (hp). Nl duodenum (-).   3/29/19 colon:GPTTI. Five 2-5 mm TC polyps (TAs). Rpt in 3 yrs  1/26/2017 EGD Lammi: NL esophagus(-).  irrg z line (-). Nl stomach. Duodenal congestion (mild chr inflamm). Esophageal polyps (hp). Rpt 6 months to check for complete removal.   2014 EGD Dr. Gusman-GEJ polyps. bx-chronic inflammation;hyperplastic change. Repeat in 6 months for surveillance.   2014 colonoscopy Dr. Gusman - 3 mm descending colon polyp. Medium internal hemorrhoids. path- fecal matter, no human tissue present. Repeat in 5 years.  2011 EGD Ch- HH. White nummular esophageal lesions. irregular z line. Esophageal nodule. Path- mild chronic gastritis. No IM. Esophageal candida.  2011 colonoscopy Ch- sigmoid tics. Internal hemorrhoids. 4 mm ascending colon polyp. Path-hyperplastic polyp. repeat in 5 years.   2009 EGD Ch- Schatzki ring, esophogeal nodule, HH, irregular z line. Path-chronic gastritis  2007 colonoscopy Girgrah- WNL  2007 EGD Girgrah- irregular z line. Path-chronic inflammation. No IM.    Previous Surgeries  5/26/2023:  Robotic hiatal hernia repair, pyloromyotomy, umbilical hernia repair, gastropexy, excision of lesion in the stomach (GIST)       ROS:  Constitutional: No fevers, no chills, +weight loss,   ENT: No allergies  CV: No chest pain, no palpitations, +  perif. edema, no sob on exertion  Pulm: No cough, no shortness of breath, + wheezes, no sputum  Ophtho: No vision changes  GI: see HPI; also no nausea, no  vomiting, no change in appetite  Derm: No rash  Heme: No lymphadenopathy, No bruising  MSK: + arthritis, no muscle pain, no muscle weakness  : No dysuria, No hematuria  Endo: No hot or cold intolerance  Neuro: No syncope, No seizure,     PMHX:  has a past medical history of Abnormal Pap smear, Acid reflux, Allergy, Arthritis, Encounter for blood transfusion, GERD (gastroesophageal reflux disease), Hiatal hernia (3/24/2023), History of migraine headaches, colonic polyp, Hypertension, Idiopathic neuropathy (7/20/2012), ILD (interstitial lung disease) (11/6/2013), Iron deficiency anemia (3/18/2014), MRSA carrier, Osteopenia, Pneumonia, Pulmonary fibrosis, Pulmonary hypertension, Raynaud's disease, Scleroderma, diffuse, Sjogren's syndrome, and Vitamin D deficiency (11/14/2013).    PSHX:  has a past surgical history that includes Dilation and curettage of uterus; Breast biopsy; Cervical conization w/ laser (1970); Hysterectomy (1990); Esophagogastroduodenoscopy; Colonoscopy; Esophagogastroduodenoscopy (N/A, 3/29/2019); Colonoscopy (N/A, 3/29/2019); Esophageal manometry with measurement of impedance (N/A, 3/4/2020); 24 hour impedance pH monitoring of esophagus in patient not taking acid reducing medications (N/A, 3/4/2020); Varicose vein surgery; Right heart catheterization (Right, 1/5/2021); Esophagogastroduodenoscopy (N/A, 2/2/2021); Colonoscopy (N/A, 5/10/2021); Anorectal manometry (N/A, 5/10/2021); Esophagogastroduodenoscopy (N/A, 7/1/2022); Right heart catheterization (Right, 3/16/2023); Robot-assisted repair of hiatal hernia using da Saturnino Xi (N/A, 3/23/2023); xi robotic pyloromyotomy (N/A, 3/23/2023); repair, hernia, umbilical (N/A, 3/23/2023); xi robotic gastropexy (N/A, 3/23/2023); and excision, lesion, stomach, laparoscopic (N/A, 3/23/2023).    The patient's social and family histories were reviewed by me and updated in the appropriate section of the electronic medical record.    Review of patient's allergies  indicates:   Allergen Reactions    Sulfa (sulfonamide antibiotics)      Other reaction(s): Rash       Current Outpatient Medications   Medication Sig    acetaminophen-codeine 300-30mg (TYLENOL #3) 300-30 mg Tab Take 1 tablet by mouth every 6 (six) hours as needed (pain).    albuterol (VENTOLIN HFA) 90 mcg/actuation inhaler Inhale 2 puffs into the lungs every 4 (four) hours as needed for Wheezing. Rescue    carboxymethylcellulose sodium (REFRESH TEARS) 0.5 % Drop Apply 1-2 drops to every as needed    ergocalciferol (ERGOCALCIFEROL) 50,000 unit Cap Take 1 capsule (50,000 Units total) by mouth every 7 days.    ferrous sulfate 325 (65 FE) MG EC tablet Take 325 mg by mouth every morning.    multivitamin capsule Take 1 capsule by mouth once daily.    mycophenolate mofetil (CELLCEPT) 200 mg/mL SusR Take 5 mLs (1,000 mg total) by mouth 2 (two) times daily.    nabumetone (RELAFEN) 750 MG tablet Take 1 tablet (750 mg total) by mouth every evening.    NIFEdipine (ADALAT CC) 90 MG TbSR TAKE 1 TABLET(90 MG) BY MOUTH EVERY DAY (Patient taking differently: Take by mouth nightly. TAKE 1 TABLET(90 MG) BY MOUTH EVERY DAY (TAKES NIGHTLY WITH 30 MG TABLET TOTAL 120 MG))    NIFEdipine (PROCARDIA-XL) 30 MG (OSM) 24 hr tablet TAKE 1 TABLET(30 MG) BY MOUTH EVERY DAY (Patient taking differently: Take by mouth nightly. TAKE WITH 90 MG TABLET EVERY NIGHT (TOTAL 120 MG))    pravastatin (PRAVACHOL) 20 MG tablet TAKE 1 TABLET(20 MG) BY MOUTH EVERY EVENING    pregabalin (LYRICA) 300 MG Cap Take 1 capsule (300 mg total) by mouth 2 (two) times daily.    PREVIDENT 5000 BOOSTER PLUS 1.1 % Pste SMARTSIG:To Teeth    PREVIDENT 5000 SENSITIVE 1.1-5 % Pste BRUSH FOR 2 MINUTES TWICE PER DAY    RABEprazole (ACIPHEX) 20 mg tablet Take 1 tablet (20 mg total) by mouth 2 (two) times daily.    tadalafil (ADCIRCA) 20 mg Tab Take 2 tablets (40 mg total) by mouth once daily.    tiZANidine (ZANAFLEX) 4 MG tablet Take 1 tablet (4 mg total) by mouth nightly as needed  "(muscle pain).    flu vac 2022 [id number], (FLUAD QUAD 2022-23,65Y UP,,PF,) 60 mcg (15 mcg x 4)/0.5 mL Syrg Inject into the muscle. (Patient not taking: Reported on 5/2/2023)    sars-cov-2, covid-19, (MODERNA COVID-19) 50 mcg/0.25 ml injection (BOOSTER) Inject into the muscle. (Patient not taking: Reported on 5/2/2023)     No current facility-administered medications for this visit.         Objective Findings:    Vital Signs:  /65   Pulse 71   Ht 5' 5" (1.651 m)   Wt 69.6 kg (153 lb 7 oz)   SpO2 100%   BMI 25.53 kg/m²  Body mass index is 25.53 kg/m².    Physical Exam:   General appearance: alert, cooperative, no distress, evidence of systemic sclerosis w  narrow oral aperture.   HENT: Normocephalic, atraumatic, neck symmetrical, no nasal discharge  Eyes: conjunctivae/corneas clear, PERRL, EOM's intact  Lungs: clear to auscultation bilaterally, no dullness to percussion bilaterally  Heart: regular rate and rhythm without rub; no displacement of the PMI  Abdomen: soft, non-tender; bowel sounds normoactive; no organomegaly  Extremities: extremities symmetric; no clubbing, cyanosis, or edema,  raynauds,   sclerodactyly, osteolysis of digits, digital ulcers.  Integument: Skin color, texture, turgor normal; no rashes; hair distrubution normal,  telangectasia, tightness of skin.    Neurologic: Alert and oriented X 3, normal strength, normal coordination and gait  Psychiatric: no pressured speech; normal affect; no evidence of impaired cognition        Labs:  Lab Results   Component Value Date    WBC 4.91 03/24/2023    HGB 11.3 (L) 03/24/2023    HCT 35.2 (L) 03/24/2023     03/24/2023    CHOL 109 (L) 10/07/2022    TRIG 58 10/07/2022    HDL 43 10/07/2022    ALT 7 (L) 03/16/2023    AST 17 03/16/2023     03/24/2023    K 3.9 03/24/2023     03/24/2023    CREATININE 0.6 03/24/2023    BUN 10 03/24/2023    CO2 23 03/24/2023    TSH 3.041 10/07/2022    INR 1.0 06/29/2015    HGBA1C 5.2 10/07/2022 "         Assessment and plan:  This is a 72 y.o. Female with a PMH of  scleroderma, ILD, PHTN here today for the following GI symptoms:    GERD.    Severe GERD on Ph impedance  2cm HH  EGD w Gr A esophagitis on aciphex BID  Ho aspiration pneumonia x 4 and pulmonolgy/rheumatology is concerned it may be due to reflux  Case discussed in Multidisciplinary conference   5/26/2023:  Robotic hiatal hernia repair, pyloromyotomy, umbilical hernia repair, gastropexy, excision of lesion in the stomach (GIST) with significant improvement in symptoms   -Taper aciphex to prn      Pagan's Esophagus.  No dysplasia  -Repeat EGD w GEJ bx in 3 years - 02/02/2027:      Dysphagia.   Esophageal manometry w scleroderma esophagus  Improvement with dilation in the past.   -Swallowing precautions    Oropharyngeal dysphagia.   MBSS with moderate swallowing dysfunction. No aspiration observed.Recs: Dysphagia therapy, for 4-6 sessions over the course of 4-6 weeks, in order to increase tongue base retraction, increase pharyngeal contraction and increase swallow safety by implementing therapeutic maneuvers. Repeat MBSS in 6-8 weeks for reeval after completion of dysphagia therapy.  -Never went to speech therapy, but doing well right now.      Dry mouth.   -biotene    History of  esophageal candida    Gastroparesis. Early satiety.  No nausea or vomiting.   5/26/2023:  pyloromyotomy  -Restart GP diet.   -See gi dietitian  -Unable to tolerate prucalopride due to FI     Epigastric discomfort. Lower abd discomfort related to BMs.  Resolved     Gas and bloating. distention. Improved   Lactase deficiency   -Avoids artificial sugars.  -eliminate lactose - see dietitian   -Eliminate honey     Constipation.   -Start PEG    Fecal incontinence.   ARM abnormal  -Stoped prucalopride due to increased FI  -Improved w PT  -Not interested in Inter Stim     Recurrent ROXANNE off iron .   Resolved on iron   Egd/Colon negative 2021. VCE negative 2021     EGD w Gr A  esophagitis   -Po Fe daily    -Check labs      Weigh loss.  Stable  -Shakes daily     Duodenum w increased lymphocytes, nl villi on gluten.  TTG IGA negative on gluten  HLA Dq2/8 negative     Colon polyps.   -C-scope repeat 5/2026    Subcentimeter gastric GIST found incidentally while undergoing repair of hiatal hernia.  Given lack of high risk features including small size, location, lack of mitoses, no adjuvant therapy is indicated.  I would not at this time recommend formal surveillance either given the low risk and her other comorbid conditions per Dr. Vernon Green    Systemic Sclerosis      Follow up in about 6 months (around 11/2/2023).      1. Gastroesophageal reflux disease, unspecified whether esophagitis present    2. Anemia, unspecified type    3. Dysphagia, unspecified type    4. Early satiety    5. Constipation, unspecified constipation type            Orders Placed This Encounter   Procedures    CBC Auto Differential     Standing Status:   Future     Standing Expiration Date:   6/30/2024    Ferritin     Standing Status:   Future     Standing Expiration Date:   6/30/2024    Iron and TIBC     Standing Status:   Future     Standing Expiration Date:   6/30/2024           Thank you for allowing me to participate in the care of Yamel Mendoza MD

## 2023-05-02 NOTE — PATIENT INSTRUCTIONS
-Try to taper off the aciphex 20mg: Take aciphex 20mg for one week, than PPI 20mg every other day for 1 week, than every two days for one week, than every three days for a week than as needed  -Your acid reflux symptoms may get worse while you are trying to taper the PPI because your stomach will automatically increase acid production as the medication is reduced. This should improve after 2-4 weeks.      -You can take  Gaviscon as needed if your reflex gets worse with this taper. Chew 1-2 tablets after meals and at bedtime as needed (up to 4x a day).  For best results follow by a half glass of water or other liquid. Gaviscon is available in liquid formulation.  Take 1-4 tablespoons 4x a day for Regular Strength and 1-4 teaspoonfuls 4x a day for Extra Strength. The special european formulation with alginate appears more effective and is usually available on [employer].  You can try regular Gaviscon if you are unable to find the one with alginate         -Check labs     -Please start miralax 17gm in 8 oz of water daily.  You can buy this other the counter in any drug store.  Increase it to twice per day if your constipation does not improve after five days.  You can increase it further to three times per day if you do not see an improvement after additional five days.      -Try biotene (artificial saliva) 3-6 times per day, especially prior to eating to help lubricate the food.    -Make sure to drink lots of water or other fluids w each meal  -Sit up straight while eating  -Try to eat more soft and liquid foods     You can try the following products to help with dry mouth.  This should improve your difficulty swallowing as saliva is needed to lubricate food.    -Biotene (artificial saliva) 3-4 times per day  prior to meals or as needed for dry mouth.  -Xylimelts.  You can find these in local pharmacy, such as [facility] or on internet.  -Lozenge - Maynard's Breezers, ACT dry mouth lozenges sold with cough drops  -Therabreath  "mouthwash  -ACT mouthwash  -Grether's Pastilles, these are usually found on internet      -See GI dietitian to review gastroparesis and dysphagia diet and lactose free diet     What is gastroparesis?   Gastroparesis is a condition that causes nausea and vomiting. It can also make you feel full too soon after you start eating. It happens because the stomach takes too long to empty and does not move food along through your body fast enough. Gastroparesis is also called "delayed gastric emptying." ("Gastric" means "having to do with the stomach.")    Gastroparesis is a common problem among people with diabetes. It also can happen to people who have had food poisoning (gastroenteritis). But it can sometimes happen to people who have not been sick and who do not have diabetes.    When gastroparesis starts after someone has had food poisoning, it often gets better in a few days to weeks. Sometimes it lasts longer or never goes away. In people with diabetes, it usually does not go away, but there are things that can make it better.    What are the symptoms of gastroparesis?   The symptoms can include:  -Nausea with or without vomiting  -Belly pain  -Feeling full too soon after you start eating  -Bloating (feeling as though your stomach is full of air)  -Weight loss    Should I change my diet to feel better?  Yes. Some people feel better if they:  -Eat 4 to 5 small meals during the day instead of 2 or 3 big ones.  -Put food through the  before eating it.  -Cut down on foods that have a lot of fat, such as cheese and fried foods.  -Cut down on foods that have a lot of "insoluble" fiber, such as some fruits, vegetables, and beans.  -Avoid fizzy drinks, like soda, as they can cause more bloating and gas  -Avoid alcohol and smoking    If you have diabetes, it's also very important to keep your blood sugar as close to normal as possible.    Should I see a doctor or nurse?  See your doctor or nurse if you have ongoing " nausea or vomiting, belly pain, trouble eating, or weight loss.    How is gastroparesis treated?  Treatments can include:  -Treatments to help you get the food and fluids you need - This might include taking liquid food supplements or - in rare cases - being fed through a tube.  -Medicines that make the stomach empty faster  -Medicines that help prevent nausea  -Emptying the stomach with a tube - To do this, doctors can put a tube down your throat or directly into your stomach through your skin.  -Electrical stimulation or surgery of the stomach - In very rare cases, doctors use electrical stimulation or surgery to make the stomach empty.

## 2023-05-02 NOTE — PROGRESS NOTES
ALLERGY & IMMUNOLOGY CLINIC - INITIAL CONSULTATION      HISTORY OF PRESENT ILLNESS     Patient ID: Yamel Shah is a 72 y.o. female    CC: evaluation for recurrent infections    HPI: Ms. Shah is a 72 year old female with a history of systemic scleroderma (on CellCept) with ILD, diastolic dysfunction, and GERD with Pagan's esophagus who presents to clinic today as a referral from Dr. Wilson for evaluation of recurrent infections. She notes history of pneumonia about once per year for the past 2-3 years, however, this year she has been for pneumonia three times with antibiotics. She states that these pneumonias have been diagnosed by a chest x-ray. She notes that she travels frequently to see her grandchildren and would like to stay healthy so she can continue to do so. She also notes recurrent sinus infections with green/yellow discharge requiring antibiotics multiple times per year. She has had multiple urgent care visit for infections during her travels. She denies ear infections. She is also treated for chronic ulcers on her feet.     H/o Asthma: denies  H/o Eczema: denies  H/o Rhinitis: denies  Oral Allergy:  denies  Food Allergy: denies  Venom Allergy: denies  Latex Allergy: denies  Medication allergy: Sulfa medications- rash; tramadol- itching     REVIEW OF SYSTEMS     Balance of review of systems negative except as mentioned above     MEDICAL HISTORY     MedHx: active problems reviewed  SurgHx:   Past Surgical History:   Procedure Laterality Date    24 HOUR IMPEDANCE PH MONITORING OF ESOPHAGUS IN PATIENT NOT TAKING ACID REDUCING MEDICATIONS N/A 3/4/2020    Procedure: IMPEDANCE PH STUDY, ESOPHAGEAL, 24 HOUR, IN PATIENT NOT TAKING ACID REDUCING MEDICATION;  Surgeon: Annamaria Mendoza MD;  Location: [address]);  Service: Endoscopy;  Laterality: N/A;  OFF PPI/H2 Blocker   Motility Studies   Hold Narcotics x 1 days   Hold TCA x 1 days  2/26 - LVM attempting to confirm appt  2/27 -  Confirmed appt    ANORECTAL MANOMETRY N/A 5/10/2021    Procedure: MANOMETRY, ANORECTAL with balloon expulsion test;  Surgeon: Annamaria Mendoza MD;  Location: [facility] (4TH FLR);  Service: Endoscopy;  Laterality: N/A;  order combined  covid test 5/7 [facility], instructions emailed-\[facility]\""    BREAST BIOPSY      Left, benign    CERVICAL CONIZATION   W/ LASER  1970    COLONOSCOPY      COLONOSCOPY N/A 3/29/2019    Procedure: COLONOSCOPY;  Surgeon: Annamaria Mendoza MD;  Location: [facility] (2ND FLR);  Service: Endoscopy;  Laterality: N/A;    COLONOSCOPY N/A 5/10/2021    Procedure: COLONOSCOPY;  Surgeon: Annamaria Mendoza MD;  Location: [facility] (4TH FLR);  Service: Endoscopy;  Laterality: N/A;  2nd floor-previous scopes done on 2nd floor, gastroparesis  full liquid diet x2 days, clear liquid x1 day prior to procedure  covid test 5/7 [facility], instructions emailed-\[facility]\""  5/6 pt confirmed appt-\[facility]\""    DILATION AND CURETTAGE OF UTERUS      ESOPHAGEAL MANOMETRY WITH MEASUREMENT OF IMPEDANCE N/A 3/4/2020    Procedure: MANOMETRY, ESOPHAGUS, WITH IMPEDANCE MEASUREMENT;  Surgeon: Annamaria Mendoza MD;  Location: [facility] (4TH FLR);  Service: Endoscopy;  Laterality: N/A;  OFF PPI/H2 Blocker   Motility Studies   Hold Narcotics x 1 days   Hold TCA x 1 days    ESOPHAGOGASTRODUODENOSCOPY      ESOPHAGOGASTRODUODENOSCOPY N/A 3/29/2019    Procedure: EGD (ESOPHAGOGASTRODUODENOSCOPY);  Surgeon: Annamaria Mendoza MD;  Location: [facility] (2ND FLR);  Service: Endoscopy;  Laterality: N/A;  pulmonary htn    ESOPHAGOGASTRODUODENOSCOPY N/A 2/2/2021    Procedure: ESOPHAGOGASTRODUODENOSCOPY (EGD);  Surgeon: Annamaria Mendoza MD;  Location: [facility] (2ND FLR);  Service: Endoscopy;  Laterality: N/A;  2nd floor gastroparesis  3 days full liquid diet and 1 day clears  covid test 1/30 primary care, instructions sent to [facility]    ESOPHAGOGASTRODUODENOSCOPY N/A 7/1/2022    Procedure: ESOPHAGOGASTRODUODENOSCOPY (EGD);  Surgeon: Annamaria Mendoza MD;   Location: [facility] ENDO (2ND FLR);  Service: Endoscopy;  Laterality: N/A;  2nd floor-gastroparesis  full liquid diet x3 days, clear liquid diet x1 day prior to procedure  fully vaccinated, instructions sent to [facility]  6/29-pt confirmed new arrival time-[facility]    EXCISION, LESION, STOMACH, LAPAROSCOPIC N/A 3/23/2023    Procedure: XI ROBOTIC EXCISION, LESION, STOMACH;  Surgeon: Katherine Segura MD;  [facility];  Service: General;  Laterality: N/A;    HYSTERECTOMY  1990    FRANDY (AUB, Fibroids), ovaries remain    REPAIR, HERNIA, UMBILICAL N/A 3/23/2023    Procedure: REPAIR, HERNIA, UMBILICAL;  Surgeon: Katherine Segura MD;  [address]  Service: General;  Laterality: N/A;    RIGHT HEART CATHETERIZATION Right 1/5/2021    Procedure: INSERTION, CATHETER, RIGHT HEART;  Surgeon: Aureliano Sy MD;  Location: [facility] CATH LAB;  Service: Cardiology;  Laterality: Right;    RIGHT HEART CATHETERIZATION Right 3/16/2023    Procedure: INSERTION, CATHETER, RIGHT HEART;  Surgeon: Aureliano Sy MD;  Location: [facility] CATH LAB;  Service: Cardiology;  Laterality: Right;    ROBOT-ASSISTED REPAIR OF HIATAL HERNIA USING DA VERNELL XI N/A 3/23/2023    Procedure: XI ROBOTIC REPAIR, HERNIA, HIATAL;  Surgeon: Katherine Segura MD;  Location: [address];  Service: General;  Laterality: N/A;    VARICOSE VEIN SURGERY      XI ROBOTIC GASTROPEXY N/A 3/23/2023    Procedure: XI ROBOTIC GASTROPEXY;  Surgeon: Katherine Segura MD;  Location: [address];  Service: General;  Laterality: N/A;    XI ROBOTIC PYLOROMYOTOMY N/A 3/23/2023    Procedure: XI ROBOTIC PYLOROMYOTOMY;  Surgeon: Katherine Segura MD;  Location: [facility] OR [address] [place] [address];  Service: General;  Laterality: N/A;       SocHx:   -Denies smoking history and illicit drug use  -Occasionally drinks wine     FamHx:   -Asthma: sister and daughter  -Atopic Dermatitis: brother  -Rhinitis: daughter, sister, son  -Immune  deficiency: denies    Allergies: see below  Medications: MAR reviewed     PHYSICAL EXAM     Physical Exam  Constitutional:       Appearance: Normal appearance.   HENT:      Head: Normocephalic and atraumatic.      Right Ear: Tympanic membrane normal.      Left Ear: Tympanic membrane normal.      Nose: Nose normal.      Mouth/Throat:      Mouth: Mucous membranes are moist.   Eyes:      Extraocular Movements: Extraocular movements intact.      Conjunctiva/sclera: Conjunctivae normal.   Cardiovascular:      Rate and Rhythm: Normal rate and regular rhythm.   Pulmonary:      Effort: Pulmonary effort is normal. No respiratory distress.      Comments: Bibasilar crackles appreciated  Musculoskeletal:      Comments: Claw hand deformity with flexion contractures bilaterally   Skin:     Comments: Tightening of the skin   Neurological:      General: No focal deficit present.      Mental Status: She is alert and oriented to person, place, and time.   Psychiatric:         Mood and Affect: Mood normal.         Behavior: Behavior normal.         Thought Content: Thought content normal.         Judgment: Judgment normal.      LABORATORY STUDIES     Component      Latest Ref Rng & Units 3/13/2023   IgG      650 - 1600 mg/dL 2277 (H)   IgA      40 - 350 mg/dL 508 (H)   IgM      50 - 300 mg/dL 102        ALLERGEN TESTING     Skin Prick: none    Immunocaps: none     PULMONARY FUNCTION     PFTs: 10/7/2022    FVC: 1.82 (67.6%)  FEV1: 1.49 (70.9%)  Ratio: 82%  TLC: 3.20    DLCO: 11.89 (54.3%)     IMAGING & OTHER DIAGNOSTICS     CXR 12/02/2022:  FINDINGS:  Heart size normal.  Chronic interstitial lung disease more marked on the right side.  No significant airspace consolidation or pleural effusion.  No significant changes.       ASSESSMENT & PLAN     Yamel Shah is a 72 y.o. female with     Recurrent infections: Patient with significant burden of sinopulmonary infections. She is on cellcept for systemic scleroderma. Will  perform an immune evaluation with the following labs. Will consider options for supporting her immune system based on lab results. Patient will RTC on 5/30/23 to go over lab results and discuss next steps in treatment.   -     Ambulatory referral/consult to Allergy  -     PROTEIN ELECTROPHORESIS, SERUM; Future; Expected date: 05/02/2023  -     CBC Auto Differential; Future; Expected date: 05/02/2023  -     Streptococcus pneumoniae IgG Antibody (23 Serotypes), MAID; Future; Expected date: 05/02/2023  -     LYMPHOCYTE PROLILE II; Future; Expected date: 05/02/2023  -     LYMPHOCYTE PROLIFERATION, MITOGENS; Future; Expected date: 05/02/2023  -     LYMPHOCYTE PROLIFERATION ANTIGENS; Future; Expected date: 05/02/2023  -     IMMUNOGLOBULINS (IGG, IGA, IGM) QUANTITATIVE; Future; Expected date: 05/02/2023    History of reaction to sulfa antibiotics: Patient notes rash with sulfa medications. If a sulfa medication is needed in the future can consider observed oral challenge in allergy clinic.     Follow up: 5/30/2023    Patient seen and discussed with attending, Dr. Trotter.    Yanna Bahena MD  Allergy/Immunology Fellow

## 2023-05-04 ENCOUNTER — DOCUMENTATION ONLY (OUTPATIENT)
Dept: PHARMACY | Facility: CLINIC | Age: 73
End: 2023-05-04
Payer: MEDICARE

## 2023-05-08 NOTE — TELEPHONE ENCOUNTER
----- Message from Julio C Maynard sent at 5/8/2023 10:23 AM CDT -----  Type:  Pharmacy Calling to Clarify an RX      Pharmacy Name:[facility]   Prescription Name:tadalafil (ADCIRCA) 20 mg Tab  What do they need to clarify?:Verify quantity   Best Call Back Number:[phone]  Additional Information:

## 2023-05-09 NOTE — TELEPHONE ENCOUNTER
----- Message from Cici Marcus sent at 5/9/2023  9:47 AM CDT -----  Regarding: pharmacy request  Name of Caller [facility] pharmacy         Pharmacy Name [facility] pharmacy         Prescription Name  tadalafil (ADCIRCA) 20 mg Tab        What do they need to clarify? Quantity to dispense         Best Call Back Number       [facility] [place] - [place] - [address]  [address] [address]  Phone: [phone] Fax: [phone]                    Additional Information:

## 2023-05-10 RX ORDER — TADALAFIL 20 MG/1
40 TABLET ORAL DAILY
Qty: 60 TABLET | Refills: 11 | Status: SHIPPED | OUTPATIENT
Start: 2023-05-10 | End: 2023-05-10 | Stop reason: SDUPTHER

## 2023-05-10 RX ORDER — TADALAFIL 20 MG/1
40 TABLET ORAL DAILY
Qty: 60 TABLET | Refills: 11 | Status: SHIPPED | OUTPATIENT
Start: 2023-05-10

## 2023-05-10 NOTE — TELEPHONE ENCOUNTER
----- Message from Luba Sierra sent at 5/10/2023  9:44 AM CDT -----  Contact: pt  Refill request. Sent to wrong pharmacy , pt states she has been out her meds for a week  tadalafil (ADCIRCA) 20 mg Tab      [facility] [place] - [address]  [address] [place] [address]  Phone: [phone] Fax: [phone]  Phone :[phone]

## 2023-05-17 ENCOUNTER — OFFICE VISIT (OUTPATIENT)
Dept: VASCULAR SURGERY | Facility: CLINIC | Age: 73
End: 2023-05-17
Payer: MEDICARE

## 2023-05-17 ENCOUNTER — NUTRITION (OUTPATIENT)
Dept: GASTROENTEROLOGY | Facility: CLINIC | Age: 73
End: 2023-05-17
Payer: MEDICARE

## 2023-05-17 VITALS
BODY MASS INDEX: 24.61 KG/M2 | DIASTOLIC BLOOD PRESSURE: 67 MMHG | TEMPERATURE: 98 F | WEIGHT: 147.69 LBS | SYSTOLIC BLOOD PRESSURE: 135 MMHG | HEIGHT: 65 IN | HEART RATE: 70 BPM

## 2023-05-17 DIAGNOSIS — M34.9 SCLERODERMA WITH PULMONARY INVOLVEMENT: Primary | ICD-10-CM

## 2023-05-17 DIAGNOSIS — Z71.9 ENCOUNTER FOR HEALTH EDUCATION: Primary | ICD-10-CM

## 2023-05-17 PROCEDURE — 99214 OFFICE O/P EST MOD 30 MIN: CPT | Mod: PBBFAC,TXP | Performed by: SURGERY

## 2023-05-17 PROCEDURE — 99214 OFFICE O/P EST MOD 30 MIN: CPT | Mod: S$PBB,NTX,, | Performed by: SURGERY

## 2023-05-17 PROCEDURE — 99999 PR PBB SHADOW E&M-EST. PATIENT-LVL IV: ICD-10-PCS | Mod: PBBFAC,TXP,, | Performed by: SURGERY

## 2023-05-17 PROCEDURE — 99999 PR PBB SHADOW E&M-EST. PATIENT-LVL II: ICD-10-PCS | Mod: PBBFAC,TXP,,

## 2023-05-17 PROCEDURE — 99999 PR PBB SHADOW E&M-EST. PATIENT-LVL II: CPT | Mod: PBBFAC,TXP,,

## 2023-05-17 PROCEDURE — 99214 PR OFFICE/OUTPT VISIT, EST, LEVL IV, 30-39 MIN: ICD-10-PCS | Mod: S$PBB,NTX,, | Performed by: SURGERY

## 2023-05-17 PROCEDURE — 99212 OFFICE O/P EST SF 10 MIN: CPT | Mod: PBBFAC,27,TXP

## 2023-05-17 PROCEDURE — 99999 PR PBB SHADOW E&M-EST. PATIENT-LVL IV: CPT | Mod: PBBFAC,TXP,, | Performed by: SURGERY

## 2023-05-17 NOTE — PROGRESS NOTES
"Referring Provider: Annamaria Mendoza MD  Reason for Nutrition Referral: Gastroparesis Nutrition Therapy; GERD Nutrition Therapy; Lactose Intolerance    Patient Information: Yamel Shah 72 y.o.-year-old  or  female   Nutrition-related concerns: Pt presents for Gastroparesis with delayed gastric emptying. Pt reports early satiety, trouble swallowing, and regurgitation of food          A = Nutrition Assessment  Anthropometric Data Estimated body mass index is 24.58 kg/m² as calculated from the following:    Height as of an earlier encounter on 5/17/23: 5' 5" (1.651 m).    Weight as of an earlier encounter on 5/17/23: 67 kg (147 lb 11.3 oz).  Ideal Body Weight (IBW): 135lbs (61.4kg); 109%IBW   Last 3 Weights:   Wt Readings from Last 3 Encounters:   05/17/23 67 kg (147 lb 11.3 oz)   05/02/23 69.6 kg (153 lb 7 oz)   05/02/23 69.6 kg (153 lb 7 oz)     Relevant Wt hx: Pt has lost approx. 8 lbs over the past 2 weeks; pt was eating mostly vegetarian while out-of-town and staying with daughter   Biochemical Data Labs:   Lab Results   Component Value Date    XSOAKMYG02AL 48 03/25/2021    [id number] 520 04/11/2022     Lab Results   Component Value Date    HGB 13.0 05/02/2023    HCT 37.9 05/02/2023     Lab Results   Component Value Date    ALBUMIN 3.4 (L) 03/16/2023     Hemoglobin A1C   Date Value Ref Range Status   10/07/2022 5.2 4.0 - 5.6 % Final     Comment:     ADA Screening Guidelines:  5.7-6.4%  Consistent with prediabetes  >or=6.5%  Consistent with diabetes    High levels of fetal hemoglobin interfere with the HbA1C  assay. Heterozygous hemoglobin variants (HbS, HgC, etc)do  not significantly interfere with this assay.   However, presence of multiple variants may affect accuracy.     12/10/2020 5.4 4.0 - 5.6 % Final     Comment:     ADA Screening Guidelines:  5.7-6.4%  Consistent with prediabetes  >or=6.5%  Consistent with diabetes  High levels of fetal hemoglobin interfere with " the HbA1C  assay. Heterozygous hemoglobin variants (HbS, HgC, etc)do  not significantly interfere with this assay.   However, presence of multiple variants may affect accuracy.       Meds:reviewed   Dietary Data  Appetite: Good  Dietary Intake:  Breakfast:  Boiled eggs (2); tea or coffee; banana; avocado  Lunch:  Combined lunch/dinner at 3-4pm: [employer] (nuggets, chicken sandwich); waffle fries; fruit bowl; sweet tea to drink   Snacks: 1-2x/day  Drinks Root Beer frequently  Dessert: Pudding or fruit (watermelon)  Snacks on Nuts, popcorn, crackers    Overall impression of dietary recall:   Patient currently eats 2 meals/day and occasional snacks. Pt has largest meal between 3p-5p in the afternoon and tries to stop all eating by 7pm each night. Pt does have many choices in her diet that are not consistent with Gastroparesis diet or Lactose-intolerance recommendations. Frequent fried-food and fast food consumption reported, as well as high-fiber choices, such as popcorn, nuts, whole wheat bread, etc..     Other Data:  Supplements/ MVI: MVI, iron,  Vit D                     Food Allergies/intolerances: Lactose  Dx: Delayed Gastric Emptying with Gastroparesis, Lactose intolerance, GERD     D = Nutrition Diagnosis   Patient Assessment: Patient is at increased nutrition risk due to dx of gastroparesis with delayed gastric emptying and need for diet education to manage symptoms and ensure adequate nutrition.    Patient knowledge of healthy eating and exercise patterns was assessed to be fair. Session was spent educating patient on minimizing fat and fiber intake, eating small, more frequent meals and snacks, preparing/cooking foods to a soft consistency and chewing well. Reviewed strategies for choosing and consuming healthy, well-balanced meals and snacks regularly that are within the recommended low-fiber, low-fat guidelines for the Gastroparesis diet. Emphasis was placed on quick, easy methods of food preparation (ex.  using a crockpot, canned meats, canned vegetables, fruit cups) to increase adherence.    Discussed lactose-free dairy alternatives, how to read ingredients lists to find hidden sources of lactose, and lower-lactose/well-tolerated lactose-containing dairy choices. Recommended use of Lactaid pills to help with the digestion of lactose-containing food/drink items when consumed (ex. Cheese). Also, briefly reviewed the most common GERD-triggering food and beverage sources to limit or avoid.     Discussed need for vitamin and mineral supplementation. Advised patient of potential need to add nutrition supplement beverages 1-2x daily, though patient expressed that she did not like the taste of these types of drinks.    One challenge noted with implementing of diet recommendations is the high-frequency of travel that patient and  are currently doing to visit their 4 children and grandchildren who all live in different states around the country. Discussed some potential strategies to help in keeping with eating recommendations as much as possible during travel periods.     Patient verbalized understanding.  Provided RD contact information for concerns or questions. Further education will be required to ensure adherence to Gastroparesis eating plan.  Expect fair compliance with dietary recommendations at this time.     Primary Problem: Altered GI function  Etiology: Related to a decrease in GI motility and delayed gastric emptying   Signs/symptoms: As evidenced by pt report of early satiety, and frequent regurgitation   Education Materials provided:   1. [place]: Gastroparesis Diet Tips  2. [place]: GERD Diet Tips  3. [place]: Managing Lactose Intolerance and Lactose Content of Common Foods  4. [place]: Easy to Chew and Swallow Diet (IDDSI 7)     I = Nutrition Intervention  Recommendation #1 Eat smaller, more frequent meals and snacks (4-6 times per day)    Recommendation #2 Avoid high-fat, fried, greasy foods; liquid fats may be better tolerated. Choose lean meats, poultry, and fish and cook to a tender, soft consistency. Avoid fatty, greasy meats and fried meats, poultry and fish. Choose low-fat dairy products. See Foods Not Recommended list on handout for foods that are high in fat.   Recommendation #3 Avoid high-fiber, uncooked/raw vegetables, fruits, grain products, beans, and whole nuts. Remove the skins, seeds, hulls of all fruits and vegetables and consume only at a softened consistency. See list Foods Recommended list on handout for low-fiber foods and preparation methods.   Recommendation #4 Avoid foods that increase acid reflux including acidic, spicy, fried and greasy foods. Avoid mint, caffeine, chocolate. Do not drink carbonated beverages.   Recommendation #5 Add a nutrition-supplement drink 1-2x/daily between meals to ensure meeting calorie and vitamin/mineral needs until weight is stable and appetite/tolerance for solid foods improves      M = Nutrition Monitoring   Indicator 1. Diet recall    Indicator 2. Weight/BMI      E= Nutrition Evaluation   Goal 1. Diet recall shows good compliance with recommendations reviewed during session    Goal 2. Weight shows trend towards goal of maintaining body weight without unintentional weight loss     Consultation Time: 55 Minutes  Follow Up: Patient provided with Dietitian contact number and advised to call or make future appointment if further consultation is needed/desired.    Communication with provider via Epic  Signature: Roberta Nettles, MPH, RDN, LDN

## 2023-05-17 NOTE — PROGRESS NOTES
Yamel Farzanehtorito Shah  05/17/2023    HPI:  Patient is a 72 y.o. female h/o scleroderma (recent rx with iv veletri / prostaglandin) who is here today for f/u -  She presented with bilateral lower extremity venous ulcers, these began in February 2015; healed in summer 2015 - have returned x2 - doing local wound care. No PAD symptoms - no claudication/rest pain.  She also reports abut a 10 year history of venous ulcers, which in the past have always responded to compression dressings. She did have a 1 year period of time immediately preceding this most recent episode where she did not have ulcers.  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      Current Outpatient Medications:     albuterol (VENTOLIN HFA) 90 mcg/actuation inhaler, Inhale 2 puffs into the lungs every 4 (four) hours as needed for Wheezing. Rescue, Disp: 18 g, Rfl: 3    carboxymethylcellulose sodium (REFRESH TEARS) 0.5 % Drop, Apply 1-2 drops to every as needed, Disp: , Rfl:     ergocalciferol (ERGOCALCIFEROL) 50,000 unit Cap, Take 1 capsule (50,000 Units total) by mouth every 7 days., Disp: 12 capsule, Rfl: 1    ferrous sulfate 325 (65 FE) MG EC tablet, Take 325 mg by mouth every morning., Disp: , Rfl:     flu vac 2022 [id number],PF, (FLUAD QUAD 2022-23,65Y UP,,PF,) 60 mcg (15 mcg x 4)/0.5 mL Syrg, Inject into the muscle., Disp: 0.5 mL, Rfl: 0    multivitamin capsule, Take 1 capsule by mouth once daily., Disp: , Rfl:     mycophenolate mofetil (CELLCEPT) 200 mg/mL SusR, Take 5 mLs (1,000 mg total) by mouth 2 (two) times daily., Disp: 480 mL, Rfl: 1    nabumetone (RELAFEN) 750 MG tablet, Take 1 tablet  (750 mg total) by mouth every evening., Disp: 30 tablet, Rfl: 3    NIFEdipine (ADALAT CC) 90 MG TbSR, TAKE 1 TABLET(90 MG) BY MOUTH EVERY DAY (Patient taking differently: Take by mouth nightly. TAKE 1 TABLET(90 MG) BY MOUTH EVERY DAY (TAKES NIGHTLY WITH 30 MG TABLET TOTAL 120 MG)), Disp: 90 tablet, Rfl: 3    NIFEdipine (PROCARDIA-XL) 30 MG (OSM) 24 hr tablet, TAKE 1 TABLET(30 MG) BY MOUTH EVERY DAY (Patient taking differently: Take by mouth nightly. TAKE WITH 90 MG TABLET EVERY NIGHT (TOTAL 120 MG)), Disp: 30 tablet, Rfl: 11    pravastatin (PRAVACHOL) 20 MG tablet, TAKE 1 TABLET(20 MG) BY MOUTH EVERY EVENING, Disp: 90 tablet, Rfl: 2    pregabalin (LYRICA) 300 MG Cap, Take 1 capsule (300 mg total) by mouth 2 (two) times daily., Disp: 60 capsule, Rfl: 6    PREVIDENT 5000 BOOSTER PLUS 1.1 % Pste, SMARTSIG:To Teeth, Disp: , Rfl:     PREVIDENT 5000 SENSITIVE 1.1-5 % Pste, BRUSH FOR 2 MINUTES TWICE PER DAY, Disp: , Rfl:     RABEprazole (ACIPHEX) 20 mg tablet, Take 1 tablet (20 mg total) by mouth 2 (two) times daily., Disp: 60 tablet, Rfl: 11    sars-cov-2, covid-19, (MODERNA COVID-19) 50 mcg/0.25 ml injection (BOOSTER), Inject into the muscle., Disp: 0.25 mL, Rfl: 0    tadalafil (ADCIRCA) 20 mg Tab, Take 2 tablets (40 mg total) by mouth once daily., Disp: 60 tablet, Rfl: 11    tiZANidine (ZANAFLEX) 4 MG tablet, Take 1 tablet (4 mg total) by mouth nightly as needed (muscle pain)., Disp: 30 tablet, Rfl: 3    PHYSICAL EXAM:   Right Arm BP - Sittin/63 (23 0842)  Left Arm BP - Sittin/67 (23 0842)   Vitals:    23 0839   BP: 135/67   Pulse: 70   Temp: 97.8 °F (36.6 °C)     Pulse: 70  Temp: 97.8 °F (36.6 °C)               Cardiac: Normal rate and regular rhythm, S1 and S2 normal; PMI non-displaced          Abdomen: Soft, nontender, nondistended, no masses or organomegaly     No rebound tenderness noted; bowel sounds normal     Pulsatile aortic mass is not palpable.      Extremities:    2+ femoral  pulses bilaterally                                                2+ pedal pulses palpable.                                               No pedal edema                                               Edema/leg swelling:  now L > R 2-3+ pitting                                               Hyperpigmentation: bilateral leg.                                               Recurred dorsum feet ulcers x2 > 5cm (prior: Healed R medial; active R lateral ulcers; R dorsal ulcers)    LAB RESULTS:  Lab Results   Component Value Date    K 3.9 03/24/2023    K 4.2 03/16/2023    K 4.0 03/13/2023    CREATININE 0.6 03/24/2023    CREATININE 0.7 03/16/2023    CREATININE 0.7 03/13/2023     Lab Results   Component Value Date    WBC 7.71 05/02/2023    WBC 6.55 05/02/2023    WBC 4.91 03/24/2023    HCT 37.9 05/02/2023    HCT 40.3 05/02/2023    HCT 35.2 (L) 03/24/2023     05/02/2023     05/02/2023     03/24/2023     Lab Results   Component Value Date    HGBA1C 5.2 10/07/2022    HGBA1C 5.4 12/10/2020     IMAGING:  ABIs  R 1.2  L 1.14  Triphasic waveforms x2    Prior:  U/S: closed R GSV   No DVT    Venous U/S: 3/22/23  R GSV: thrombosed; successful EVLT procedure  L GSV: 3.9mm (mid)    R SSV: 3.1 mm  L SSV: 5.0 mm  No DVT    IMP/PLAN:  72 y.o. female withh/o scleroderma (recent rx with iv veletri / prostaglandin) with venous ulcers, these began in February 2015; healed in summer 2015; now Chronic venous insufficiency - CEAP C6 R lateral ulcer, C5 R medial ulcer  Doing well s/p R GSV evlt - much less edema than before; s/p R LSV evlt  In Dec 2020, ulcers in dorsum of feet x2 had been healing and smaller R lateral ankle ulceration: in the past several months, the wounds in the dorsum have recurred  Re-assured; no arterial insufficiency  She will cont local wound care; I offered her wound care in our clinic and they'll think about it bc they've been thru many in the past    Timmy Claudio MD FACS [facility]  Vascular/Endovascular  Surgery

## 2023-05-22 ENCOUNTER — TELEPHONE (OUTPATIENT)
Dept: TRANSPLANT | Facility: CLINIC | Age: 73
End: 2023-05-22
Payer: MEDICARE

## 2023-05-22 ENCOUNTER — HOSPITAL ENCOUNTER (OUTPATIENT)
Dept: PULMONOLOGY | Facility: CLINIC | Age: 73
Discharge: HOME | End: 2023-05-22
Payer: MEDICARE

## 2023-05-22 ENCOUNTER — TELEPHONE (OUTPATIENT)
Dept: RHEUMATOLOGY | Facility: CLINIC | Age: 73
End: 2023-05-22
Payer: MEDICARE

## 2023-05-22 VITALS — WEIGHT: 152.31 LBS | BODY MASS INDEX: 25.38 KG/M2 | HEIGHT: 65 IN

## 2023-05-22 DIAGNOSIS — M34.9 SCLERODERMA: ICD-10-CM

## 2023-05-22 DIAGNOSIS — J84.9 ILD (INTERSTITIAL LUNG DISEASE): ICD-10-CM

## 2023-05-22 DIAGNOSIS — J84.9 INTERSTITIAL PULMONARY DISEASE, UNSPECIFIED: Primary | ICD-10-CM

## 2023-05-22 LAB
DLCO SINGLE BREATH LLN: 16.02
DLCO SINGLE BREATH PRE REF: 52.3 %
DLCO SINGLE BREATH REF: 21.76
DLCOC SBVA LLN: 2.87
DLCOC SBVA REF: 4.26
DLCOC SINGLE BREATH LLN: 16.02
DLCOC SINGLE BREATH REF: 21.76
DLCOCSBVAULN: 5.65
DLCOCSINGLEBREATHULN: 27.49
DLCOSINGLEBREATHULN: 27.49
DLCOVA LLN: 2.87
DLCOVA PRE REF: 92.5 %
DLCOVA PRE: 3.94 ML/(MIN*MMHG*L) (ref 2.87–5.65)
DLCOVA REF: 4.26
DLCOVAULN: 5.65
ERV LLN: -16449.36
ERV PRE REF: 94.8 %
ERV REF: 0.64
ERVULN: ABNORMAL
FEF 25 75 LLN: 0.8
FEF 25 75 PRE REF: 103.5 %
FEF 25 75 REF: 1.76
FEV05 LLN: 0.88
FEV05 REF: 1.74
FEV1 FVC LLN: 66
FEV1 FVC PRE REF: 105.6 %
FEV1 FVC REF: 79
FEV1 LLN: 1.5
FEV1 PRE REF: 75.6 %
FEV1 REF: 2.08
FRCPLETH LLN: 1.95
FRCPLETH PREREF: 74.7 %
FRCPLETH REF: 2.77
FRCPLETHULN: 3.59
FVC LLN: 1.97
FVC PRE REF: 71.1 %
FVC REF: 2.67
IVC PRE: 1.89 L (ref 1.97–3.4)
IVC SINGLE BREATH LLN: 1.97
IVC SINGLE BREATH PRE REF: 70.8 %
IVC SINGLE BREATH REF: 2.67
IVCSINGLEBREATHULN: 3.4
LLN IC: -16447.94
PEF LLN: 4.36
PEF PRE REF: 87.1 %
PEF REF: 5.84
PHYSICIAN COMMENT: ABNORMAL
PRE DLCO: 11.37 ML/(MIN*MMHG) (ref 16.02–27.49)
PRE ERV: 0.61 L (ref -16449.36–16450.64)
PRE FEF 25 75: 1.82 L/S (ref 0.8–3.15)
PRE FET 100: 5.83 SEC
PRE FEV05 REF: 76.7 %
PRE FEV1 FVC: 83.05 % (ref 66.27–89.45)
PRE FEV1: 1.58 L (ref 1.5–2.64)
PRE FEV5: 1.33 L (ref 0.88–2.59)
PRE FRC PL: 2.07 L (ref 1.95–3.59)
PRE FVC: 1.9 L (ref 1.97–3.4)
PRE IC: 1.35 L (ref -16447.94–16452.06)
PRE PEF: 5.08 L/S (ref 4.36–7.32)
PRE REF IC: 65.2 %
PRE RV: 1.46 L (ref 1.55–2.7)
PRE TLC: 3.41 L (ref 4.12–6.09)
PRE VTG: 2.18 L
RAW PRE REF: 106.1 %
RAW PRE: 3.25 CMH2O*S/L (ref 3.06–3.06)
RAW REF: 3.06
REF IC: 2.06
RV LLN: 1.55
RV PRE REF: 68.5 %
RV REF: 2.12
RVTLC LLN: 34
RVTLC PRE REF: 98.9 %
RVTLC PRE: 42.64 % (ref 33.51–52.69)
RVTLC REF: 43
RVTLCULN: 53
RVULN: 2.7
SGAW PRE REF: 120.2 %
SGAW PRE: 0.12 1/(CMH2O*S) (ref 0.1–0.1)
SGAW REF: 0.1
TLC LLN: 4.12
TLC PRE REF: 66.8 %
TLC REF: 5.11
TLC ULN: 6.09
ULN IC: ABNORMAL
VA PRE: 2.88 L (ref 4.96–4.96)
VA SINGLE BREATH LLN: 4.96
VA SINGLE BREATH PRE REF: 58.2 %
VA SINGLE BREATH REF: 4.96
VASINGLEBREATHULN: 4.96
VC LLN: 1.97
VC PRE REF: 73.3 %
VC PRE: 1.96 L (ref 1.97–3.4)
VC REF: 2.67
VC ULN: 3.4

## 2023-05-22 PROCEDURE — 94729 PR C02/MEMBANE DIFFUSE CAPACITY: ICD-10-PCS | Mod: 26,S$PBB,NTX, | Performed by: INTERNAL MEDICINE

## 2023-05-22 PROCEDURE — 94726 PULM FUNCT TST PLETHYSMOGRAP: ICD-10-PCS | Mod: 26,S$PBB,NTX, | Performed by: INTERNAL MEDICINE

## 2023-05-22 PROCEDURE — 94729 DIFFUSING CAPACITY: CPT | Mod: PBBFAC,NTX | Performed by: INTERNAL MEDICINE

## 2023-05-22 PROCEDURE — 94010 BREATHING CAPACITY TEST: ICD-10-PCS | Mod: 26,S$PBB,NTX, | Performed by: INTERNAL MEDICINE

## 2023-05-22 PROCEDURE — 94726 PLETHYSMOGRAPHY LUNG VOLUMES: CPT | Mod: PBBFAC,NTX | Performed by: INTERNAL MEDICINE

## 2023-05-22 PROCEDURE — 94010 BREATHING CAPACITY TEST: CPT | Mod: PBBFAC,NTX | Performed by: INTERNAL MEDICINE

## 2023-05-22 PROCEDURE — 94729 DIFFUSING CAPACITY: CPT | Mod: 26,S$PBB,NTX, | Performed by: INTERNAL MEDICINE

## 2023-05-22 PROCEDURE — 94618 PULMONARY STRESS TESTING: ICD-10-PCS | Mod: 26,S$PBB,NTX, | Performed by: INTERNAL MEDICINE

## 2023-05-22 PROCEDURE — 94618 PULMONARY STRESS TESTING: CPT | Mod: 26,S$PBB,NTX, | Performed by: INTERNAL MEDICINE

## 2023-05-22 PROCEDURE — 94726 PLETHYSMOGRAPHY LUNG VOLUMES: CPT | Mod: 26,S$PBB,NTX, | Performed by: INTERNAL MEDICINE

## 2023-05-22 PROCEDURE — 94010 BREATHING CAPACITY TEST: CPT | Mod: 26,S$PBB,NTX, | Performed by: INTERNAL MEDICINE

## 2023-05-22 PROCEDURE — 94618 PULMONARY STRESS TESTING: CPT | Mod: PBBFAC,NTX | Performed by: INTERNAL MEDICINE

## 2023-05-22 NOTE — PROCEDURES
Yamel Shah is a 72 y.o.  (other) female patient, who presents for a 6 minute walk test ordered by MD Steve.  The diagnosis is Interstitial Lung Disease; Scleroderma/CREST, Pulmonary Hypertension.  The patient's BMI is 25.4 kg/m2.  Predicted distance (lower limit of normal) is 305.57 meters.      Test Results:    The test was completed without stopping.  The total time walked was 360 seconds.  During walking, the patient reported:  Dyspnea; Lightheadedness. The patient used no assistive devices during testing.  Oxygen saturation was measured using a forehead oximetry probe.    05/22/2023---------Distance: 457.2 meters (1500 feet)     O2 Sat % Supplemental Oxygen Heart Rate Blood Pressure Ju Scale   Pre-exercise  (Resting) 100 % Room Air 62 bpm 109/51 mmHg 3   During Exercise 98 % Room Air 90 bpm 136/62 mmHg 4   Post-exercise  (Recovery) 100 % Room Air  71 bpm 120/54 mmHg      Recovery Time: 131 seconds    Performing nurse/tech: KAYLEY Tucker      PREVIOUS STUDY:   02/28/2023---------Distance: 457.2 meters (1500 feet)       O2 Sat % Supplemental Oxygen Heart Rate Blood Pressure Ju Scale   Pre-exercise  (Resting) 98 % Room Air 74 bpm 133/63 mmHg 3   During Exercise 94 % Room Air 113 bpm 169/76 mmHg 4   Post-exercise  (Recovery) 97 % Room Air  84 bpm 135/63 mmHg        CLINICAL INTERPRETATION:  Six minute walk distance is 457.2 meters (1500 feet) with somewhat heavy dyspnea.  During exercise, there was no significant desaturation while breathing room air.  Blood pressure remained stable and Heart rate increased significantly with walking.  The patient reported non-pulmonary symptoms during exercise.  Since the previous study in February 2023, exercise capacity is unchanged.  Based upon age and body mass index, exercise capacity is normal.

## 2023-05-23 ENCOUNTER — OFFICE VISIT (OUTPATIENT)
Dept: TRANSPLANT | Facility: CLINIC | Age: 73
End: 2023-05-23
Payer: MEDICARE

## 2023-05-23 VITALS
HEART RATE: 52 BPM | DIASTOLIC BLOOD PRESSURE: 56 MMHG | HEIGHT: 65 IN | WEIGHT: 151.88 LBS | BODY MASS INDEX: 25.3 KG/M2 | SYSTOLIC BLOOD PRESSURE: 115 MMHG | OXYGEN SATURATION: 98 %

## 2023-05-23 DIAGNOSIS — I27.20 PULMONARY HYPERTENSION: ICD-10-CM

## 2023-05-23 DIAGNOSIS — M34.9 SCLERODERMA WITH PULMONARY INVOLVEMENT: Primary | ICD-10-CM

## 2023-05-23 DIAGNOSIS — K21.9 GASTROESOPHAGEAL REFLUX DISEASE, UNSPECIFIED WHETHER ESOPHAGITIS PRESENT: ICD-10-CM

## 2023-05-23 PROCEDURE — 99214 PR OFFICE/OUTPT VISIT, EST, LEVL IV, 30-39 MIN: ICD-10-PCS | Mod: S$PBB,NTX,, | Performed by: INTERNAL MEDICINE

## 2023-05-23 PROCEDURE — 99214 OFFICE O/P EST MOD 30 MIN: CPT | Mod: PBBFAC,NTX | Performed by: INTERNAL MEDICINE

## 2023-05-23 PROCEDURE — 99214 OFFICE O/P EST MOD 30 MIN: CPT | Mod: S$PBB,NTX,, | Performed by: INTERNAL MEDICINE

## 2023-05-23 PROCEDURE — 99999 PR PBB SHADOW E&M-EST. PATIENT-LVL IV: ICD-10-PCS | Mod: PBBFAC,TXP,, | Performed by: INTERNAL MEDICINE

## 2023-05-23 PROCEDURE — 99999 PR PBB SHADOW E&M-EST. PATIENT-LVL IV: CPT | Mod: PBBFAC,TXP,, | Performed by: INTERNAL MEDICINE

## 2023-05-23 NOTE — PROGRESS NOTES
The breathing tests show mild and stable restriction. The diffusing capacity is lower and the right heart cath did not show pulmonary hypertension. Therefore, suggest high resolution CT chest to reassess the lung structure. Pallavi will schedule. SARINA

## 2023-05-23 NOTE — PROGRESS NOTES
ADVANCED LUNG DISEASE CLINIC FOLLOW-UP                                                                                                                                             Reason for Visit:  SSc-ILD    Referring Physician: No ref. provider found    History of Present Illness: Yamel Shah is a 72 y.o. female who is on 0L of oxygen.  She is on no assisted ventilation.  Her New York Heart Association Class is II and a Karnofsky score of 90% - Able to carry on normal activity: minor symptoms of disease. She is not diabetic.    She presents today for routine SSc-ILD follow-up.  She states that she has been doing well. She had pneumonia earlier this year, but has since returned to her baseline. Does not have any changes in respiratory symptoms.  Does not require supplemental oxygen. Her GERD is better controlled and she is scheduled for hernia repair later this week.  She knows her triggers and takes a PPI but does still endorse occasional reflux mainly at night when laying flat.  Denies any cough. Has PH on Adcirca. Notes trace pedal edema. Overall, doing very well. No recent hospitalizations/exacerbations. On MMF and tolerating well.     Past Medical History:   Diagnosis Date    Abnormal Pap smear     Acid reflux     Allergy     Arthritis     Encounter for blood transfusion     GERD (gastroesophageal reflux disease)     Hiatal hernia 3/24/2023    History of migraine headaches     Hx of colonic polyp     Hypertension     Idiopathic neuropathy 7/20/2012    ILD (interstitial lung disease) 11/6/2013    Iron deficiency anemia 3/18/2014    MRSA carrier     Osteopenia     Pneumonia     Pulmonary fibrosis     Pulmonary hypertension     Raynaud's disease     Scleroderma, diffuse     Sjogren's syndrome     Vitamin D deficiency 11/14/2013       Past Surgical History:   Procedure Laterality Date    24 HOUR IMPEDANCE PH MONITORING OF ESOPHAGUS IN PATIENT NOT TAKING ACID REDUCING MEDICATIONS N/A 3/4/2020     Procedure: IMPEDANCE PH STUDY, ESOPHAGEAL, 24 HOUR, IN PATIENT NOT TAKING ACID REDUCING MEDICATION;  Surgeon: Annamaria Mendoza MD;  Location: [facility] (4TH FLR);  Service: Endoscopy;  Laterality: N/A;  OFF PPI/H2 Blocker   Motility Studies   Hold Narcotics x 1 days   Hold TCA x 1 days  2/26 - LVM attempting to confirm appt  2/27 - Confirmed appt    ANORECTAL MANOMETRY N/A 5/10/2021    Procedure: MANOMETRY, ANORECTAL with balloon expulsion test;  Surgeon: Annamaria Mendoza MD;  Location: [facility] (4TH FLR);  Service: Endoscopy;  Laterality: N/A;  order combined  covid test 5/7 [employer], instructions emailed-\[facility]\""    BREAST BIOPSY      Left, benign    CERVICAL CONIZATION   W/ LASER  1970    COLONOSCOPY      COLONOSCOPY N/A 3/29/2019    Procedure: COLONOSCOPY;  Surgeon: Annamaria Mendoza MD;  Location: [facility] (2ND FLR);  Service: Endoscopy;  Laterality: N/A;    COLONOSCOPY N/A 5/10/2021    Procedure: COLONOSCOPY;  Surgeon: Annamaria Mendoza MD;  Location: [facility] (4TH FLR);  Service: Endoscopy;  Laterality: N/A;  2nd floor-previous scopes done on 2nd floor, gastroparesis  full liquid diet x2 days, clear liquid x1 day prior to procedure  covid test 5/7 [employer], instructions emailed-\[facility]\""  5/6 pt confirmed appt-[facility]    DILATION AND CURETTAGE OF UTERUS      ESOPHAGEAL MANOMETRY WITH MEASUREMENT OF IMPEDANCE N/A 3/4/2020    Procedure: MANOMETRY, ESOPHAGUS, WITH IMPEDANCE MEASUREMENT;  Surgeon: Annamaria Mendoza MD;  Location: [facility] (4TH FLR);  Service: Endoscopy;  Laterality: N/A;  OFF PPI/H2 Blocker   Motility Studies   Hold Narcotics x 1 days   Hold TCA x 1 days    ESOPHAGOGASTRODUODENOSCOPY      ESOPHAGOGASTRODUODENOSCOPY N/A 3/29/2019    Procedure: EGD (ESOPHAGOGASTRODUODENOSCOPY);  Surgeon: Annamaria Mendoza MD;  Location: [facility] (2ND FLR);  Service: Endoscopy;  Laterality: N/A;  pulmonary htn    ESOPHAGOGASTRODUODENOSCOPY N/A 2/2/2021    Procedure: ESOPHAGOGASTRODUODENOSCOPY (EGD);  Surgeon: Annamaria Mendoza,  MD;  [facility]  Service: Endoscopy;  Laterality: N/A;  2nd floor gastroparesis  3 days full liquid diet and 1 day clears  covid test 1/30 primary care, instructions sent to [facility]    ESOPHAGOGASTRODUODENOSCOPY N/A 7/1/2022    Procedure: ESOPHAGOGASTRODUODENOSCOPY (EGD);  Surgeon: Annamaria Mendoza MD;  [facility]  Service: Endoscopy;  Laterality: N/A;  2nd floor-gastroparesis  full liquid diet x3 days, clear liquid diet x1 day prior to procedure  fully vaccinated, instructions sent to [facility]  6/29-pt confirmed new arrival time-[facility]    EXCISION, LESION, STOMACH, LAPAROSCOPIC N/A 3/23/2023    Procedure: XI ROBOTIC EXCISION, LESION, STOMACH;  Surgeon: Katherine Segura MD;  Location: [address];  Service: General;  Laterality: N/A;    HYSTERECTOMY  1990    FRANDY (AUB, Fibroids), ovaries remain    REPAIR, HERNIA, UMBILICAL N/A 3/23/2023    Procedure: REPAIR, HERNIA, UMBILICAL;  Surgeon: Katherine Segura MD;  Location: [address];  Service: General;  Laterality: N/A;    RIGHT HEART CATHETERIZATION Right 1/5/2021    Procedure: INSERTION, CATHETER, RIGHT HEART;  Surgeon: Aureliano Sy MD;  Location: [facility] CATH LAB;  Service: Cardiology;  Laterality: Right;    RIGHT HEART CATHETERIZATION Right 3/16/2023    Procedure: INSERTION, CATHETER, RIGHT HEART;  Surgeon: Aureliano Sy MD;  Location: [facility] CATH LAB;  Service: Cardiology;  Laterality: Right;    ROBOT-ASSISTED REPAIR OF HIATAL HERNIA USING DA VERNELL XI N/A 3/23/2023    Procedure: XI ROBOTIC REPAIR, HERNIA, HIATAL;  Surgeon: Katherine Segura MD;  Location: [address];  Service: General;  Laterality: N/A;    VARICOSE VEIN SURGERY      XI ROBOTIC GASTROPEXY N/A 3/23/2023    Procedure: XI ROBOTIC GASTROPEXY;  Surgeon: Katherine Segura MD;  Location: [facility] OR [address] [place] [address];  Service: General;  Laterality: N/A;    XI ROBOTIC PYLOROMYOTOMY N/A 3/23/2023    Procedure:  XI ROBOTIC PYLOROMYOTOMY;  Surgeon: Katherine Segura MD;  Location: [facility] OR [address] [place] [address];  Service: General;  Laterality: N/A;       Allergies: Sulfa (sulfonamide antibiotics) and Tramadol    Current Outpatient Medications   Medication Sig    albuterol (VENTOLIN HFA) 90 mcg/actuation inhaler Inhale 2 puffs into the lungs every 4 (four) hours as needed for Wheezing. Rescue    carboxymethylcellulose sodium (REFRESH TEARS) 0.5 % Drop Apply 1-2 drops to every as needed    ergocalciferol (ERGOCALCIFEROL) 50,000 unit Cap Take 1 capsule (50,000 Units total) by mouth every 7 days.    ferrous sulfate 325 (65 FE) MG EC tablet Take 325 mg by mouth every morning.    flu vac 2022 [id number],PF, (FLUAD QUAD 2022-23,65Y UP,,PF,) 60 mcg (15 mcg x 4)/0.5 mL Syrg Inject into the muscle.    multivitamin capsule Take 1 capsule by mouth once daily.    mycophenolate mofetil (CELLCEPT) 200 mg/mL SusR Take 5 mLs (1,000 mg total) by mouth 2 (two) times daily.    nabumetone (RELAFEN) 750 MG tablet Take 1 tablet (750 mg total) by mouth every evening.    NIFEdipine (ADALAT CC) 90 MG TbSR TAKE 1 TABLET(90 MG) BY MOUTH EVERY DAY (Patient taking differently: Take by mouth nightly. TAKE 1 TABLET(90 MG) BY MOUTH EVERY DAY (TAKES NIGHTLY WITH 30 MG TABLET TOTAL 120 MG))    NIFEdipine (PROCARDIA-XL) 30 MG (OSM) 24 hr tablet TAKE 1 TABLET(30 MG) BY MOUTH EVERY DAY (Patient taking differently: Take by mouth nightly. TAKE WITH 90 MG TABLET EVERY NIGHT (TOTAL 120 MG))    pravastatin (PRAVACHOL) 20 MG tablet TAKE 1 TABLET(20 MG) BY MOUTH EVERY EVENING    pregabalin (LYRICA) 300 MG Cap Take 1 capsule (300 mg total) by mouth 2 (two) times daily.    PREVIDENT 5000 BOOSTER PLUS 1.1 % Pste SMARTSIG:To Teeth    PREVIDENT 5000 SENSITIVE 1.1-5 % Pste BRUSH FOR 2 MINUTES TWICE PER DAY    RABEprazole (ACIPHEX) 20 mg tablet Take 1 tablet (20 mg total) by mouth 2 (two) times daily.    sars-cov-2, covid-19, (MODERNA COVID-19) 50 mcg/0.25 ml injection (BOOSTER)  Inject into the muscle.    tadalafil (ADCIRCA) 20 mg Tab Take 2 tablets (40 mg total) by mouth once daily.    tiZANidine (ZANAFLEX) 4 MG tablet Take 1 tablet (4 mg total) by mouth nightly as needed (muscle pain).     No current facility-administered medications for this visit.       Immunization History   Administered Date(s) Administered    COVID-19 MRNA, LN-S PF (MODERNA HALF 0.25 ML DOSE) 03/12/2021, 04/21/2022    COVID-19, MRNA, LN-S, PF (MODERNA FULL 0.5 ML DOSE) 02/09/2021, 09/21/2021    COVID-19, MRNA, LN-S, PF (Pfizer) (Purple Cap) 10/13/2022    COVID-19, mRNA, LNP-S, bivalent booster, PF (Moderna Omicron) 10/13/2022    Influenza 11/02/2015, 09/21/2021    Influenza (FLUAD) - Quadrivalent - Adjuvanted - PF *Preferred* (65+) 09/11/2020, 09/12/2022    Influenza - High Dose - PF (65 years and older) 10/09/2017, 10/27/2018, 09/09/2019    Influenza - Quadrivalent - PF *Preferred* (6 months and older) 11/03/2006, 10/08/2007, 09/29/2009, 09/26/2014, 11/02/2015, 09/27/2016    Influenza Split 11/03/2006, 10/08/2007, 09/29/2009, 09/26/2014    Pneumococcal Conjugate - 13 Valent 01/16/2013, 01/16/2013    Pneumococcal Polysaccharide - 23 Valent 09/27/2016, 11/12/2021    Tdap 02/06/2019    Zoster 01/16/2013    Zoster Recombinant 11/30/2018, 02/12/2019     Family History:    Family History   Problem Relation Age of Onset    Breast cancer Mother     Hypertension Father     Breast cancer Sister     Diabetes Sister     Osteoarthritis Brother     Diabetes Brother     No Known Problems Daughter     No Known Problems Daughter     No Known Problems Son     No Known Problems Son     Breast cancer Maternal Aunt     Melanoma Neg Hx     Colon cancer Neg Hx     Crohn's disease Neg Hx     Stomach cancer Neg Hx     Ulcerative colitis Neg Hx     Rectal cancer Neg Hx     Irritable bowel syndrome Neg Hx     Esophageal cancer Neg Hx     Celiac disease Neg Hx     Ovarian cancer Neg Hx     Liver cancer Neg Hx     Pancreatic cancer Neg Hx       Social History     Substance and Sexual Activity   Alcohol Use Not Currently    Comment: wine occasionally      Social History     Substance and Sexual Activity   Drug Use No      Social History     Socioeconomic History    Marital status:      Spouse name: Lewis    Number of children: 4   Occupational History    Occupation: -retired     Employer: [employer]     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 No    Breast-feeding No   Social History Narrative         Social Determinants of Health     Financial Resource Strain: Low Risk     Difficulty of Paying Living Expenses: Not hard at all   Food Insecurity: No Food Insecurity    Worried About Running Out of Food in the Last Year: Never true    Ran Out of Food in the Last Year: Never true   Transportation Needs: No Transportation Needs    Lack of Transportation (Medical): No    Lack of Transportation (Non-Medical): No   Physical Activity: Inactive    Days of Exercise per Week: 0 days    Minutes of Exercise per Session: 0 min   Stress: No Stress Concern Present    Feeling of Stress : Only a little   Social Connections: Moderately Isolated    Frequency of Communication with Friends and Family: More than three times a week    Frequency of Social Gatherings with Friends and Family: Never    Attends Latter day Services: Never    Active Member of Clubs or Organizations: No    Attends Club or Organization Meetings: Never    Marital Status:    Housing Stability: Low Risk     Unable to Pay for Housing in the Last Year: No    Number of Places Lived in the Last Year: 1    Unstable Housing in the  "Last Year: No     Review of Systems   Constitutional:  Negative for chills, diaphoresis, fever, malaise/fatigue and weight loss.   HENT:  Negative for congestion, ear discharge, ear pain, hearing loss, nosebleeds, sinus pain, sore throat and tinnitus.    Eyes:  Negative for blurred vision, double vision, photophobia, pain, discharge and redness.   Respiratory:  Positive for shortness of breath (heavy exertion only). Negative for cough, hemoptysis, sputum production, wheezing and stridor.    Cardiovascular:  Negative for chest pain, palpitations, orthopnea, claudication, leg swelling and PND.   Gastrointestinal:  Positive for heartburn. Negative for abdominal pain, blood in stool, constipation, diarrhea, melena, nausea and vomiting.   Genitourinary:  Negative for dysuria, flank pain, frequency, hematuria and urgency.   Musculoskeletal:  Negative for back pain, falls, joint pain, myalgias and neck pain.   Skin:  Negative for itching and rash.   Neurological:  Negative for dizziness, tingling, tremors, sensory change, speech change, focal weakness, seizures, loss of consciousness, weakness and headaches.   Endo/Heme/Allergies:  Negative for environmental allergies and polydipsia. Does not bruise/bleed easily.   Psychiatric/Behavioral:  Negative for depression, hallucinations, memory loss, substance abuse and suicidal ideas. The patient is not nervous/anxious and does not have insomnia.    Vitals  BP (!) 115/56 (BP Location: Left arm, Patient Position: Sitting, BP Method: Medium (Automatic))   Pulse (!) 52   Ht 5' 5" (1.651 m)   Wt 68.9 kg (151 lb 14.4 oz)   SpO2 98% Comment: room air  BMI 25.28 kg/m²   Physical Exam  Vitals and nursing note reviewed.   Constitutional:       General: She is not in acute distress.     Appearance: She is well-developed. She is not diaphoretic.   HENT:      Head: Normocephalic and atraumatic.      Nose: Nose normal.      Mouth/Throat:      Pharynx: No oropharyngeal exudate.   Eyes:    "   General: No scleral icterus.     Conjunctiva/sclera: Conjunctivae normal.      Pupils: Pupils are equal, round, and reactive to light.   Neck:      Thyroid: No thyromegaly.      Vascular: No JVD.      Trachea: No tracheal deviation.   Cardiovascular:      Rate and Rhythm: Normal rate and regular rhythm.      Heart sounds: Normal heart sounds. No murmur heard.    No friction rub. No gallop.   Pulmonary:      Effort: Pulmonary effort is normal. No respiratory distress.      Breath sounds: Rales present. No wheezing.   Abdominal:      General: Bowel sounds are normal. There is no distension.      Palpations: Abdomen is soft.      Tenderness: There is no abdominal tenderness.   Musculoskeletal:         General: No deformity. Normal range of motion.      Cervical back: Normal range of motion and neck supple.   Skin:     General: Skin is warm and dry.      Capillary Refill: Capillary refill takes less than 2 seconds.      Coloration: Skin is not pale.      Findings: No erythema or rash.      Comments: Skin tightening.  Digital ulcers and nailbed deformities.  Bilateral foot ulcers in dry intact dressing     Neurological:      Mental Status: She is alert and oriented to person, place, and time.      Cranial Nerves: No cranial nerve deficit.   Psychiatric:         Judgment: Judgment normal.       Labs:      Pulmonary Function Tests 5/23/2023 10/7/2022 1/3/2022 5/21/2021 9/11/2020 3/12/2019 3/6/2018   FVC 1.9 1.82 1.83 1.85 1.99 1.82 1.93   FEV1 1.58 1.49 1.44 1.48 1.57 1.43 1.52   TLC (liters) 3.41 3.2 - 3.18 2.23 3.13 3.39   DLCO (ml/mmHg sec) 11.37 11.89 - 14.11 13.84 15 16.9   FVC% 71.1 67 67 68 72 60 64   FEV1% 75.6 71 68 69 73 61 64   FEF 25-75 1.82 1.7 - 1.57 1.55 1.32 1.47   FEF 25-75% 103.5 95 - 85 84 58 64   TLC% 66.8 63 - 62 63 62 68   RV 1.46 1.33 - 1.24 1.15 1.21 1.27   RV% 68.5 63 - 59 55 61 64   DLCO% 52.3 54 - 64 62 82 112     6MW 5/22/2023 2/28/2023 10/7/2022 5/3/2022 7/6/2021 12/15/2020 10/23/2019   6MWT  Status completed without stopping completed without stopping completed without stopping completed without stopping completed without stopping completed without stopping completed without stopping   Patient Reported No complaints Other (Comment) Dyspnea;Lightheadedness Dyspnea;Leg pain Dyspnea Dyspnea Dyspnea;Leg pain   Was O2 used? No No No No No No No   6MW Distance walked (feet) 1500 1500 1400 1400 1400 1380 1362   Distance walked (meters) 457.2 457.2 426.72 426.72 426.72 420.62 415.14   Did patient stop? No No No No No No No   Oxygen Saturation 100 98 98 99 98 95 99   Supplemental Oxygen Room Air Room Air Room Air Room Air Room Air Room Air Room Air   Heart Rate 62 74 85 71 66 65 76   Blood Pressure 109/51 133/63 126/61 115/54 119/58 130/57 133/80   Ju Dyspnea Rating  moderate moderate moderate moderate moderate moderate light   Oxygen Saturation 98 94 97 96 97 - 99   Supplemental Oxygen Room Air Room Air Room Air Room Air Room Air Room Air Room Air   Heart Rate 90 113 108 88 97 94 92   Blood Pressure 136/62 169/76 138/63 138/63 135/63 139/64 136/60   Unable to obtain - - - - - Oxygen Saturation -   Ju Dyspnea Rating  somewhat heavy somewhat heavy somewhat heavy somewhat heavy somewhat heavy somewhat heavy moderate   Recovery Time (seconds) 131 160 39 130 145 124 78   Oxygen Saturation 100 97 97 98 99 99 99   Supplemental Oxygen Room Air Room Air Room Air Room Air Room Air Room Air Room Air   Heart Rate 71 84 103 83 72 71 71       Imaging:  Results for orders placed during the hospital encounter of 12/22/20    CT Chest Without Contrast    Narrative  EXAMINATION:  CT CHEST WITHOUT CONTRAST    CLINICAL HISTORY:  pulmonary hypertension; Pulmonary hypertension, unspecified    TECHNIQUE:  Using low dose technique the chest was surveyed from above the pulmonary apices through the posterior costophrenic angles.  Data was reconstructed for multiplanar images in axial, sagittal and coronal planes and for maximal  intensity projection images in the axial plane.    All CT scans at this facility use dose modulation, iterative reconstruction and/or weight based dosing when appropriate to reduce radiation dose to as low as reasonably achievable.    Xray dose: DLP = 152.03 mGy-cm.    COMPARISON:  CT chest abdomen without contrast 03/13/2019.  CT chest without contrast 09/18/2015.    FINDINGS:  Base of Neck: No significant abnormality.    Aorta: Left-sided aortic arch with 3 arterial branches. The aorta maintains normal caliber, contour and course. There is mild calcification of the thoracic aorta or coronary arteries.    Heart/pericardium: Normal size.  No pericardial effusion or calcification.  Calcification of the aortic valve annulus.    Pulmonary vasculature: Pulmonary arteries distribute normally.  Pulmonary artery size is neither specific nor sensitive for normal or elevated pulmonary arterial pressures.    -There are four pulmonary veins.    Barbara/Mediastinum: No pathologic noelle enlargement.    Esophagus: The esophagus is mildly dilated with dependent fluid, similar to prior exam.    Upper Abdomen: No abnormality of the partially imaged upper abdomen.    Thoracic soft tissues: Normal. Both breasts are present.    Bones: No acute fracture. No suspicious lytic or sclerotic lesion.    Airways: Patent.    Lungs/pleura:    -Stable biapical scarring.    -Redemonstration of cystic changes and reticulation plus subsegmental ground-glass disease, most extensive in the lower lung zones.  Radiographic appearance is more typical for NSIP but could represent UIP in this patient with scleroderma.    -There are stable scattered foci of tree-in-bud nodularities, for example within the superior segment of the right lower lobe (axial series 4, image 211).  There is associated dilatation of multiple adjacent small airways.    -0.4 cm solid pulmonary nodule in the apical segment of the right upper lobe (axial series 4, image 83), stable dating  back to 2015.    -No pleural fluid or thickening.No pleural calcification. No pneumothorax.    Impression  1.  Overall stable appearing chronic interstitial lung disease consistent with patient's history of scleroderma.  Radiographic appearance is more typical for NSIP and UIP, noting that UIP is a more common disease.    2.  Previously characterized tree-in-bud opacity along the superior aspect of the right oblique fissure appears stable and may reflect chronic inflammatory process.    3.  0.4 cm solid right upper lobe pulmonary nodule, stable dating back to 09/18/2015.  Such stability favors benign etiology.    4.  Persistent fluid-filled esophagus as noted on multiple priors placing the patient at increased risk for aspiration.  This may be related to the patient's history of scleroderma; clinical considerations will determine if further assessment of esophageal motility is warranted.    Electronically signed by resident: Leobardo Arellano  Date:    12/22/2020  Time:    11:43    Electronically signed by: Maxine Del Cid MD  Date:    12/22/2020  Time:    14:23      Cardiodiagnostics:  Results for orders placed during the hospital encounter of 12/29/21    Echo    Interpretation Summary  · The left ventricle is normal in size with normal systolic function.  · The estimated ejection fraction is 63%.  · Normal left ventricular diastolic function.  · Normal right ventricular size with normal right ventricular systolic function.  · Mild pulmonic regurgitation.  · Normal central venous pressure (3 mmHg).  · The estimated PA systolic pressure is 32 mmHg.  · Trivial posterior pericardial effusion.        Assessment:  1. Scleroderma with pulmonary involvement    2. Gastroesophageal reflux disease, unspecified whether esophagitis present    3. Pulmonary hypertension      Plan:     Followed for SSc-ILD with PAH. Currently on MMF suspension and tolerating well. FVC has been stable. DLCO stable.  No desaturations on 6MWT and  distance unchanged. Has known PH which is stable on Adcirca. No OFEV due to her GI symptoms at baseline. Continue to monitor on routine visits. Has CT chest ordered per Dr. Ahuja.     Continue to follow with GI for GERD/scleroderma esophagus. Discussed elevation of the head of her bed to help with nocturnal symptoms. Continue with PPI.    Continue Adcirca and PH follow-up. Last mPAP 18 (stable from 2017).    Follow-up in 6 months with repeat 6MWT and spirometry.       Claire Pierre PA-C  Lung Transplant    Attending Note:     I have seen and evaluated the patient with Claire Pierre PA-C. Their note reflects the content of our discussion and my plan of care.  Had multiple bouts of URTI's but is now back to her baseline.  Overall doing well with stable FVC and DLCO.  Continue with MMF.  GERD improved following hiatal hernia repair.        Roberta Leigh D.O.  Pulmonary/Critical Care and Lung Transplantation  [facility]

## 2023-05-23 NOTE — LETTER
May 23, 2023        Luis Ahuja  [address]  [facility] [address]  Phone: [phone]  Fax: [phone]             [facility] - Transplant 1st Fl  [address]  [facility] [address]  Phone: [phone]   Patient: Yamel Shah   MR Number: [id number]   YOB: 1950   Date of Visit: 5/23/2023       Dear Dr. Luis Ahuja    Thank you for referring Yamel Shah to me for evaluation. Attached you will find relevant portions of my assessment and plan of care.    If you have questions, please do not hesitate to call me. I look forward to following Yamel Shah along with you.    Sincerely,    Roberta Leigh, DO    Enclosure    If you would like to receive this communication electronically, please contact [email or web address] or [phone] to request PCA Audit Link access.    PCA Audit Link is a tool which provides read-only access to select patient information with whom you have a relationship. Its easy to use and provides real time access to review your patients record including encounter summaries, notes, results, and demographic information.    If you feel you have received this communication in error or would no longer like to receive these types of communications, please e-mail [email or web address]

## 2023-05-25 ENCOUNTER — TELEPHONE (OUTPATIENT)
Dept: SURGERY | Facility: CLINIC | Age: 73
End: 2023-05-25
Payer: MEDICARE

## 2023-05-26 ENCOUNTER — ANESTHESIA EVENT (OUTPATIENT)
Dept: SURGERY | Facility: HOSPITAL | Age: 73
End: 2023-05-26
Payer: MEDICARE

## 2023-05-26 ENCOUNTER — HOSPITAL ENCOUNTER (OUTPATIENT)
Facility: HOSPITAL | Age: 73
Discharge: HOME | End: 2023-05-26
Attending: SURGERY | Admitting: SURGERY
Payer: MEDICARE

## 2023-05-26 ENCOUNTER — ANESTHESIA (OUTPATIENT)
Dept: SURGERY | Facility: HOSPITAL | Age: 73
End: 2023-05-26
Payer: MEDICARE

## 2023-05-26 VITALS
OXYGEN SATURATION: 95 % | HEIGHT: 65 IN | BODY MASS INDEX: 25.83 KG/M2 | RESPIRATION RATE: 19 BRPM | WEIGHT: 155 LBS | HEART RATE: 64 BPM | TEMPERATURE: 98 F | SYSTOLIC BLOOD PRESSURE: 149 MMHG | DIASTOLIC BLOOD PRESSURE: 63 MMHG

## 2023-05-26 DIAGNOSIS — K41.90 FEMORAL HERNIA OF RIGHT SIDE: Primary | ICD-10-CM

## 2023-05-26 DIAGNOSIS — K40.20 BILATERAL INGUINAL HERNIA WITHOUT OBSTRUCTION OR GANGRENE, RECURRENCE NOT SPECIFIED: ICD-10-CM

## 2023-05-26 DIAGNOSIS — K40.20 NON-RECURRENT BILATERAL INGUINAL HERNIA WITHOUT OBSTRUCTION OR GANGRENE: ICD-10-CM

## 2023-05-26 PROBLEM — K40.90 RIGHT INGUINAL HERNIA: Status: ACTIVE | Noted: 2023-05-26

## 2023-05-26 PROCEDURE — 71000016 HC POSTOP RECOV ADDL HR: Mod: NTX | Performed by: SURGERY

## 2023-05-26 PROCEDURE — 88341 IMHCHEM/IMCYTCHM EA ADD ANTB: CPT | Mod: 59,TXP | Performed by: PATHOLOGY

## 2023-05-26 PROCEDURE — 88185 FLOWCYTOMETRY/TC ADD-ON: CPT | Mod: 59,TXP | Performed by: PATHOLOGY

## 2023-05-26 PROCEDURE — 88305 TISSUE EXAM BY PATHOLOGIST: CPT | Mod: 26,NTX,, | Performed by: PATHOLOGY

## 2023-05-26 PROCEDURE — 88342 CHG IMMUNOCYTOCHEMISTRY: ICD-10-PCS | Mod: 26,59,NTX, | Performed by: PATHOLOGY

## 2023-05-26 PROCEDURE — 38500 PR BIOPSY/EXCISION, LYMPH NODE(S): ICD-10-PCS | Mod: 51,RT,, | Performed by: SURGERY

## 2023-05-26 PROCEDURE — 25000003 PHARM REV CODE 250: Mod: TXP | Performed by: SURGERY

## 2023-05-26 PROCEDURE — 88365 INSITU HYBRIDIZATION (FISH): CPT | Mod: 26,NTX,, | Performed by: PATHOLOGY

## 2023-05-26 PROCEDURE — 88365 PR  TISSUE HYBRIDIZATION: ICD-10-PCS | Mod: 26,NTX,, | Performed by: PATHOLOGY

## 2023-05-26 PROCEDURE — 88341 PR IHC OR ICC EACH ADD'L SINGLE ANTIBODY  STAINPR: ICD-10-PCS | Mod: 26,NTX,, | Performed by: PATHOLOGY

## 2023-05-26 PROCEDURE — D9220A PRA ANESTHESIA: ICD-10-PCS | Mod: NTX,,, | Performed by: ANESTHESIOLOGY

## 2023-05-26 PROCEDURE — 88364 INSITU HYBRIDIZATION (FISH): CPT | Mod: NTX | Performed by: PATHOLOGY

## 2023-05-26 PROCEDURE — 63600175 PHARM REV CODE 636 W HCPCS: Mod: TXP | Performed by: STUDENT IN AN ORGANIZED HEALTH CARE EDUCATION/TRAINING PROGRAM

## 2023-05-26 PROCEDURE — 88365 INSITU HYBRIDIZATION (FISH): CPT | Mod: NTX | Performed by: PATHOLOGY

## 2023-05-26 PROCEDURE — 88184 FLOWCYTOMETRY/ TC 1 MARKER: CPT | Mod: NTX | Performed by: PATHOLOGY

## 2023-05-26 PROCEDURE — 36000711: Mod: TXP | Performed by: SURGERY

## 2023-05-26 PROCEDURE — 88305 TISSUE EXAM BY PATHOLOGIST: ICD-10-PCS | Mod: 26,NTX,, | Performed by: PATHOLOGY

## 2023-05-26 PROCEDURE — C1781 MESH (IMPLANTABLE): HCPCS | Mod: TXP | Performed by: SURGERY

## 2023-05-26 PROCEDURE — 88189 PR  FLOWCYTOMETRY/READ, 16 & > MARKERS: ICD-10-PCS | Mod: NTX,,, | Performed by: PATHOLOGY

## 2023-05-26 PROCEDURE — 88342 IMHCHEM/IMCYTCHM 1ST ANTB: CPT | Mod: 26,59,NTX, | Performed by: PATHOLOGY

## 2023-05-26 PROCEDURE — 71000044 HC DOSC ROUTINE RECOVERY FIRST HOUR: Mod: NTX | Performed by: SURGERY

## 2023-05-26 PROCEDURE — 88305 TISSUE EXAM BY PATHOLOGIST: CPT | Mod: TXP | Performed by: PATHOLOGY

## 2023-05-26 PROCEDURE — 49659 UNLSTD LAPS PX HRNAP HRNRPHY: CPT | Mod: ,,, | Performed by: SURGERY

## 2023-05-26 PROCEDURE — 49659 PR LAPROSCOPIC FEMORAL HERNIA REPAIR: ICD-10-PCS | Mod: ,,, | Performed by: SURGERY

## 2023-05-26 PROCEDURE — 63600175 PHARM REV CODE 636 W HCPCS: Mod: NTX | Performed by: SURGERY

## 2023-05-26 PROCEDURE — 88189 FLOWCYTOMETRY/READ 16 & >: CPT | Mod: NTX,,, | Performed by: PATHOLOGY

## 2023-05-26 PROCEDURE — D9220A PRA ANESTHESIA: Mod: NTX,,, | Performed by: ANESTHESIOLOGY

## 2023-05-26 PROCEDURE — 38500 BIOPSY/REMOVAL LYMPH NODES: CPT | Mod: 51,RT,, | Performed by: SURGERY

## 2023-05-26 PROCEDURE — 36000710: Mod: TXP | Performed by: SURGERY

## 2023-05-26 PROCEDURE — 71000015 HC POSTOP RECOV 1ST HR: Mod: TXP | Performed by: SURGERY

## 2023-05-26 PROCEDURE — 27201423 OPTIME MED/SURG SUP & DEVICES STERILE SUPPLY: Mod: NTX | Performed by: SURGERY

## 2023-05-26 PROCEDURE — 37000008 HC ANESTHESIA 1ST 15 MINUTES: Mod: NTX | Performed by: SURGERY

## 2023-05-26 PROCEDURE — 37000009 HC ANESTHESIA EA ADD 15 MINS: Mod: NTX | Performed by: SURGERY

## 2023-05-26 PROCEDURE — 88342 IMHCHEM/IMCYTCHM 1ST ANTB: CPT | Mod: 59,NTX | Performed by: PATHOLOGY

## 2023-05-26 PROCEDURE — 25000003 PHARM REV CODE 250: Mod: TXP | Performed by: STUDENT IN AN ORGANIZED HEALTH CARE EDUCATION/TRAINING PROGRAM

## 2023-05-26 PROCEDURE — 25000003 PHARM REV CODE 250: Mod: NTX | Performed by: STUDENT IN AN ORGANIZED HEALTH CARE EDUCATION/TRAINING PROGRAM

## 2023-05-26 PROCEDURE — 88341 IMHCHEM/IMCYTCHM EA ADD ANTB: CPT | Mod: 26,NTX,, | Performed by: PATHOLOGY

## 2023-05-26 DEVICE — MESH 3DMAX ANAT LG RIGHT 4X6IN: Type: IMPLANTABLE DEVICE | Site: GROIN | Status: FUNCTIONAL

## 2023-05-26 RX ORDER — HYDROMORPHONE HYDROCHLORIDE 1 MG/ML
0.2 INJECTION, SOLUTION INTRAMUSCULAR; INTRAVENOUS; SUBCUTANEOUS EVERY 5 MIN PRN
Status: DISCONTINUED | OUTPATIENT
Start: 2023-05-26 | End: 2023-05-26 | Stop reason: HOSPADM

## 2023-05-26 RX ORDER — ONDANSETRON 2 MG/ML
INJECTION INTRAMUSCULAR; INTRAVENOUS
Status: DISCONTINUED | OUTPATIENT
Start: 2023-05-26 | End: 2023-05-26

## 2023-05-26 RX ORDER — PROPOFOL 10 MG/ML
VIAL (ML) INTRAVENOUS
Status: DISCONTINUED | OUTPATIENT
Start: 2023-05-26 | End: 2023-05-26

## 2023-05-26 RX ORDER — LIDOCAINE HYDROCHLORIDE 20 MG/ML
INJECTION, SOLUTION EPIDURAL; INFILTRATION; INTRACAUDAL; PERINEURAL
Status: DISCONTINUED | OUTPATIENT
Start: 2023-05-26 | End: 2023-05-26

## 2023-05-26 RX ORDER — ACETAMINOPHEN 10 MG/ML
INJECTION, SOLUTION INTRAVENOUS
Status: DISCONTINUED | OUTPATIENT
Start: 2023-05-26 | End: 2023-05-26

## 2023-05-26 RX ORDER — SODIUM CHLORIDE 9 MG/ML
INJECTION, SOLUTION INTRAVENOUS CONTINUOUS
Status: DISCONTINUED | OUTPATIENT
Start: 2023-05-26 | End: 2023-05-26 | Stop reason: HOSPADM

## 2023-05-26 RX ORDER — DEXAMETHASONE SODIUM PHOSPHATE 4 MG/ML
INJECTION, SOLUTION INTRA-ARTICULAR; INTRALESIONAL; INTRAMUSCULAR; INTRAVENOUS; SOFT TISSUE
Status: DISCONTINUED | OUTPATIENT
Start: 2023-05-26 | End: 2023-05-26

## 2023-05-26 RX ORDER — FENTANYL CITRATE 50 UG/ML
INJECTION, SOLUTION INTRAMUSCULAR; INTRAVENOUS
Status: DISCONTINUED | OUTPATIENT
Start: 2023-05-26 | End: 2023-05-26

## 2023-05-26 RX ORDER — HYDROCODONE BITARTRATE AND ACETAMINOPHEN 5; 325 MG/1; MG/1
1 TABLET ORAL EVERY 6 HOURS PRN
Qty: 18 TABLET | Refills: 0 | Status: SHIPPED | OUTPATIENT
Start: 2023-05-26 | End: 2023-07-28

## 2023-05-26 RX ORDER — SODIUM CHLORIDE 0.9 % (FLUSH) 0.9 %
10 SYRINGE (ML) INJECTION
Status: DISCONTINUED | OUTPATIENT
Start: 2023-05-26 | End: 2023-05-26 | Stop reason: HOSPADM

## 2023-05-26 RX ORDER — KETAMINE HCL IN 0.9 % NACL 50 MG/5 ML
SYRINGE (ML) INTRAVENOUS
Status: DISCONTINUED | OUTPATIENT
Start: 2023-05-26 | End: 2023-05-26

## 2023-05-26 RX ORDER — VASOPRESSIN 20 [USP'U]/ML
INJECTION, SOLUTION INTRAMUSCULAR; SUBCUTANEOUS
Status: DISCONTINUED | OUTPATIENT
Start: 2023-05-26 | End: 2023-05-26

## 2023-05-26 RX ORDER — DROPERIDOL 2.5 MG/ML
0.62 INJECTION, SOLUTION INTRAMUSCULAR; INTRAVENOUS ONCE AS NEEDED
Status: COMPLETED | OUTPATIENT
Start: 2023-05-26 | End: 2023-05-26

## 2023-05-26 RX ORDER — ROCURONIUM BROMIDE 10 MG/ML
INJECTION, SOLUTION INTRAVENOUS
Status: DISCONTINUED | OUTPATIENT
Start: 2023-05-26 | End: 2023-05-26

## 2023-05-26 RX ORDER — BUPIVACAINE HYDROCHLORIDE 2.5 MG/ML
INJECTION, SOLUTION EPIDURAL; INFILTRATION; INTRACAUDAL
Status: DISCONTINUED | OUTPATIENT
Start: 2023-05-26 | End: 2023-05-26 | Stop reason: HOSPADM

## 2023-05-26 RX ADMIN — ROCURONIUM BROMIDE 10 MG: 10 INJECTION INTRAVENOUS at 07:05

## 2023-05-26 RX ADMIN — ACETAMINOPHEN 1000 MG: 10 INJECTION, SOLUTION INTRAVENOUS at 08:05

## 2023-05-26 RX ADMIN — HYDROMORPHONE HYDROCHLORIDE 0.2 MG: 1 INJECTION, SOLUTION INTRAMUSCULAR; INTRAVENOUS; SUBCUTANEOUS at 10:05

## 2023-05-26 RX ADMIN — DROPERIDOL 0.62 MG: 2.5 INJECTION, SOLUTION INTRAMUSCULAR; INTRAVENOUS at 10:05

## 2023-05-26 RX ADMIN — CEFAZOLIN 2 G: 2 INJECTION, POWDER, FOR SOLUTION INTRAMUSCULAR; INTRAVENOUS at 07:05

## 2023-05-26 RX ADMIN — ONDANSETRON 4 MG: 2 INJECTION INTRAMUSCULAR; INTRAVENOUS at 07:05

## 2023-05-26 RX ADMIN — ROCURONIUM BROMIDE 50 MG: 10 INJECTION INTRAVENOUS at 07:05

## 2023-05-26 RX ADMIN — FENTANYL CITRATE 100 MCG: 50 INJECTION, SOLUTION INTRAMUSCULAR; INTRAVENOUS at 07:05

## 2023-05-26 RX ADMIN — VASOPRESSIN 1 UNITS: 20 INJECTION INTRAVENOUS at 07:05

## 2023-05-26 RX ADMIN — LIDOCAINE HYDROCHLORIDE 100 MG: 20 INJECTION, SOLUTION EPIDURAL; INFILTRATION; INTRACAUDAL; PERINEURAL at 07:05

## 2023-05-26 RX ADMIN — PROPOFOL 140 MG: 10 INJECTION, EMULSION INTRAVENOUS at 07:05

## 2023-05-26 RX ADMIN — DEXAMETHASONE SODIUM PHOSPHATE 4 MG: 4 INJECTION, SOLUTION INTRAMUSCULAR; INTRAVENOUS at 07:05

## 2023-05-26 RX ADMIN — ROCURONIUM BROMIDE 10 MG: 10 INJECTION INTRAVENOUS at 08:05

## 2023-05-26 RX ADMIN — SUGAMMADEX 200 MG: 100 INJECTION, SOLUTION INTRAVENOUS at 09:05

## 2023-05-26 RX ADMIN — Medication 20 MG: at 07:05

## 2023-05-26 NOTE — BRIEF OP NOTE
[facility] - Surgery ([facility])  Brief Operative Note    Surgery Date: 5/26/2023     Surgeon(s) and Role:     * Katherine Segura MD - Primary     * Susy Gray MD - Resident - Assisting        Pre-op Diagnosis:  Non-recurrent bilateral inguinal hernia without obstruction or gangrene [K40.20]    Post-op Diagnosis:  Post-Op Diagnosis Codes:     * Non-recurrent bilateral inguinal hernia without obstruction or gangrene [K40.20]    Procedure(s) (LRB):  XI ROBOTIC REPAIR, HERNIA, INGUINAL, FEMORAL (Right)  EXCISIONAL BIOPSY, LYMPH NODE, INGUINAL (Right)    Anesthesia: General    Operative Findings: right inguinal and femoral hernia containing fat; right inguinal lymph node (x2) removed    Estimated Blood Loss: * No values recorded between 5/26/2023  7:41 AM and 5/26/2023  9:38 AM *         Specimens:   Specimen (24h ago, onward)       Start     Ordered    05/26/23 0916  Specimen to Pathology, Surgery General Surgery  Once        Comments: Pre-op Diagnosis: Non-recurrent bilateral inguinal hernia without obstruction or gangrene [K40.20]Procedure(s):XI ROBOTIC REPAIR, HERNIA, INGUINAL, With Mesh Explore the leftBIOPSY, LYMPH NODE excisional right groin Number of specimens: 2Name of specimens: 1. Right groin lymph node- FRESH SENT FOR LYMPHOMA WORKUP2. Right groin lymph node- permanent     References:    Click here for ordering Quick Tip   Question Answer Comment   Procedure Type: General Surgery    Specimen Class: Complex case/Special    Which provider would you like to cc? MIRNA LEYVA    Release to patient Immediate        05/26/23 0921                      Discharge Note    OUTCOME: Patient tolerated treatment/procedure well without complication and is now ready for discharge.    DISPOSITION: Home or Self Care    FINAL DIAGNOSIS:  Femoral hernia of right side    FOLLOWUP: In clinic    DISCHARGE INSTRUCTIONS:    Discharge Procedure Orders   Lifting restrictions   Order Comments: No lifting objects over 15 lbs  for 4-6 weeks     Notify your health care provider if you experience any of the following:  temperature >100.4     Notify your health care provider if you experience any of the following:  persistent nausea and vomiting or diarrhea     Notify your health care provider if you experience any of the following:  severe uncontrolled pain     Notify your health care provider if you experience any of the following:  redness, tenderness, or signs of infection (pain, swelling, redness, odor or green/yellow discharge around incision site)     No dressing needed     Activity as tolerated     Shower on day dressing removed (No bath)   Order Comments: Do not submerge or soak incision. Do not scrub incision. You may allow water and gentle soap to run over the incision while showering.

## 2023-05-26 NOTE — PROGRESS NOTES
PreOp complete. Surgery consent needs to be clarified. Anesthesia consent and update to H&P needed. Patient's belongings given to , Lewis.

## 2023-05-26 NOTE — OP NOTE
DATE OF PROCEDURE: 5/26/2023    PRE OP DIAGNOSIS: Non-recurrent bilateral inguinal hernia without obstruction or gangrene [K40.20]  Bilateral inguinal lymphadenopathy    POST OP DIAGNOSIS: Non-recurrent right inguinal and femoral hernias without obstruction or gangrene  Bilateral inguinal lymphadenopathy    PROCEDURE: Procedure(s) (LRB):  XI ROBOTIC REPAIR, HERNIA, INGUINAL, FEMORAL (Right)  EXCISIONAL BIOPSY, LYMPH NODE, INGUINAL (Right)    Surgeon(s) and Role:     * Katherine Segura MD - Primary     * Susy Gray MD - Resident - Assisting    ANESTHESIA: General    INDICATION: Patient is a 72 year-old woman who presented with a symptomatic right groin bulge. Ultrasound noted bilateral inguinal hernias and bilateral inguinal lymphadenopathy R>L. After discussing the risks, benefits and alternatives, she elected to proceed with ATIF robotic bilateral inguinofemoral hernia repair with mesh and concomitant excisional inguinal lymph node biopsy. Informed consent was obtained.     FINDINGS:  1. Moderately sized right indirect and right femoral hernias, reduced and repaired with large Bard 3D MAX MID anatomic mesh.  2. No evidence of pelvic floor hernia on the left.  3. Palpable lymphadenopathy of right groin - excision of two adjacent right inguinal lymph nodes measuring 2.5-cm each in greatest diameter.     PROCEDURE IN DETAIL: The patient was met in the pre-op area, her identity and consent confirmed and surgical site marked. She was then brought to the Operating Room and placed supine on the table on a pink pad. General anesthesia was induced and she was intubated without incident. A Beck catheter was placed. SCDs and a warming blanket were placed and pre-op IV antibiotics were infused within 30 minutes of the incision. Her abdomen and groins were prepped and draped in sterile fashion and a pre-procedural pause was performed by all members of the surgical and anesthesia teams. Access to the abdomen was  gained via an infraumbilical incision using Contreras technique with an 8-mm trocar and the abdomen insufflated to 12-mmHg with CO2. Two additional 8-mm trocars were placed at the site of her prior robotic trocar scars under direct vision. The patient was placed in Trendelenburg and the Xi robot docked. At this point I scrubbed out and moved to the surgeons console. Inspection noted no hernia in the left abdomen, groin, or pelvis. We turned out attention to the right. The right peritoneum was scored at the laterally at the ASIS 6-cm cephalad to the internal ring and extended medially to the medial umbilical ligament. A peritoneal flap was created. Dissection was carried down laterally to the anterior superior iliac spine and medially to the pubic symphysis and ramus. A femoral and indirect hernia were noted. There was no evidence of a direct hernia. The defects were medium in size with a moderate amount of incarcerated fat which was reduced. The contents and brought deeply and freely into the preperitoneal space. The peritoneum was peeled down off the round ligament. Once complete, a right-sided large Bard 3D MAX MID anatomic mesh was placed into the preperitoneal space and fashioned such that all potential hernia defects were covered and the reduced peritoneum and retroperitoneal contents were below the inferior border of the mesh. The mesh was anchored medially to Iain's ligament with a 2-0 vicryl suture. The peritoneal flap was then closed with running 3-0 absorbable V-loc suture in a [place] fashion. The needles were removed. The robot was undocked and the abdomen was desufflated and the remaining ports were removed under visualization. The infraumbilical fascial defect was closed with an 0-vicryl suture in figure-of-eight fashion. A 3-cm incision was made directly over palpable right inguinal lymphadenopathy. Dissection was carried down through the soft tissue and connor's fascia opened. The enlarged lymph nodes  were cleared circumferentially tying off vessels and lymphatics with 3-0 silk. Once freed the specimen appeared to be two adjacent 2.5 cm lymph nodes. These were sharply halved and sent for lymphoma/leukemia screen and permanent pathologic evaluation. The wound was irrigated and hemostasis ensured. The fascia and soft tissues were reapproximated with 3-0 vicryl. All skin incisions were closed with 4-0 Monocryl and reinforced with Dermabond. The Beck catheter was removed. The patient was awoken from anesthesia and extubated without complication. She was brought to the PACU in good condition having tolerated the operation well.     EBL: 2 mL  Specimens: Right inguinal lymph node for lymphoma/leukemia screen and permanent pathologic evaluation  Drains: None  Complications: None apparent   Disposition: Home from PACU after voiding     Surgical sponge and instrument count was correct at the end of the case. I was present and scrubbed (or at the console) for the entirety of this operation.     Katherine Segura  5/26/2023

## 2023-05-26 NOTE — ANESTHESIA POSTPROCEDURE EVALUATION
Anesthesia Post Evaluation    Patient: Yamel Shah    Procedure(s) Performed: Procedure(s) (LRB):  XI ROBOTIC REPAIR, HERNIA, INGUINAL, FEMORAL (Right)  EXCISIONAL BIOPSY, LYMPH NODE, INGUINAL (Right)    Final Anesthesia Type: general      Patient location during evaluation: PACU  Patient participation: Yes- Able to Participate  Level of consciousness: awake  Post-procedure vital signs: reviewed and stable  Pain management: adequate  Airway patency: patent    PONV status at discharge: No PONV  Anesthetic complications: no      Cardiovascular status: hemodynamically stable  Respiratory status: unassisted and spontaneous ventilation  Hydration status: euvolemic  Follow-up not needed.          Vitals Value Taken Time   /64 05/26/23 0949   Temp 36.8 °C (98.2 °F) 05/26/23 0943   Pulse 66 05/26/23 0954   Resp 16 05/26/23 0954   SpO2 100 % 05/26/23 0954   Vitals shown include unvalidated device data.      No case tracking events are documented in the log.      Pain/Lory Score: No data recorded

## 2023-05-26 NOTE — INTERVAL H&P NOTE
The patient has been examined and the H&P has been reviewed:    I concur with the findings and no changes have occurred since H&P was written. Will also perform lymph node biopsy    Surgery risks, benefits and alternative options discussed and understood by patient/family.          There are no hospital problems to display for this patient.

## 2023-05-26 NOTE — TRANSFER OF CARE
"Anesthesia Transfer of Care Note    Patient: Yamel Shah    Procedure(s) Performed: Procedure(s) (LRB):  XI ROBOTIC REPAIR, HERNIA, INGUINAL, FEMORAL (Right)  EXCISIONAL BIOPSY, LYMPH NODE, INGUINAL (Right)    Patient location: PACU    Anesthesia Type: general    Transport from OR: Transported from OR on 6-10 L/min O2 by face mask with adequate spontaneous ventilation    Post pain: adequate analgesia    Post assessment: no apparent anesthetic complications    Post vital signs: stable    Level of consciousness: awake and alert    Nausea/Vomiting: no nausea/vomiting    Complications: none    Transfer of care protocol was followed      Last vitals:   Visit Vitals  /60 (BP Location: Left arm, Patient Position: Lying)   Pulse 75   Temp 37 °C (98.6 °F) (Temporal)   Resp 17   Ht 5' 5" (1.651 m)   Wt 70.3 kg (155 lb)   SpO2 98%   Breastfeeding No   BMI 25.79 kg/m²     "

## 2023-05-26 NOTE — ANESTHESIA PREPROCEDURE EVALUATION
[facility]  Anesthesia Pre-Operative Evaluation         Patient Name: Yamel Shah  YOB: 1950  MRN: [id number]    SUBJECTIVE:     Pre-operative evaluation for Procedure(s) (LRB):  XI ROBOTIC REPAIR, HERNIA, INGUINAL and FEMORAL, Bilateral, With Mesh (Bilateral)  BIOPSY, LYMPH NODE excisional right groin possible left (N/A)     05/26/2023    Yamel Shah is a 72 y.o. female w/ a significant PMHx of scleroderma with lung involvement but not on oxygen. Pulmonary hypertension.    Patient now presents for the above procedure(s).      LDA: None documented.       Prev airway:    Intubation:     Induction:  Intravenous    Intubated:  Postinduction    Mask Ventilation:  Easy mask    Attempts:  1    Attempted By:  CRNA    Method of Intubation:  Video laryngoscopy    Blade:  West 3    Laryngeal View Grade: Grade IIb - only the arytenoids and epiglottis seen      Difficult Airway Encountered?: No      Complications:  None    Airway Device:  Oral endotracheal tube    Airway Device Size:  7.0    Style/Cuff Inflation:  Cuffed (inflated to minimal occlusive pressure)    Inflation Amount (mL):  7    Tube secured:  21    Secured at:  The lips    Placement Verified By:  Capnometry    Complicating Factors:  None    Findings Post-Intubation:  BS equal bilateral and atraumatic/condition of teeth unchanged       Drips: None documented.   sodium chloride 0.9%         Patient Active Problem List   Diagnosis    Scleroderma with pulmonary involvement    Idiopathic neuropathy    Pulmonary hypertension associated with systemic disorder    Venous insufficiency of both lower extremities    Telangiectasia    ILD (interstitial lung disease)    Fecal incontinence    Urinary incontinence    Vitamin D deficiency    Osteopenia    Skin ulcers of both feet    Scleroderma    Raynaud's disease without gangrene    contracture    Essential hypertension    ROXANNE (iron deficiency  anemia)    Arterial insufficiency with ischemic ulcer    Chronic pain disorder    Stiffness in joint    Painful cervical range of motion    Weakness of both upper extremities    Posture abnormality    Abnormal finding of blood chemistry, unspecified     Pulmonary hypertension    Cervical spondylosis    Pelvic floor dysfunction    PVD (peripheral vascular disease)    Immunosuppression due to drug therapy    Calcification of aorta    Chronic pulmonary heart disease       Review of patient's allergies indicates:   Allergen Reactions    Sulfa (sulfonamide antibiotics)      Other reaction(s): Rash    Tramadol Itching       Current Inpatient Medications:      No current facility-administered medications on file prior to encounter.     Current Outpatient Medications on File Prior to Encounter   Medication Sig Dispense Refill    nabumetone (RELAFEN) 750 MG tablet Take 1 tablet (750 mg total) by mouth every evening. 30 tablet 3    NIFEdipine (ADALAT CC) 90 MG TbSR TAKE 1 TABLET(90 MG) BY MOUTH EVERY DAY (Patient taking differently: Take by mouth nightly. TAKE 1 TABLET(90 MG) BY MOUTH EVERY DAY (TAKES NIGHTLY WITH 30 MG TABLET TOTAL 120 MG)) 90 tablet 3    NIFEdipine (PROCARDIA-XL) 30 MG (OSM) 24 hr tablet TAKE 1 TABLET(30 MG) BY MOUTH EVERY DAY (Patient taking differently: Take by mouth nightly. TAKE WITH 90 MG TABLET EVERY NIGHT (TOTAL 120 MG)) 30 tablet 11    pravastatin (PRAVACHOL) 20 MG tablet TAKE 1 TABLET(20 MG) BY MOUTH EVERY EVENING 90 tablet 2    pregabalin (LYRICA) 300 MG Cap Take 1 capsule (300 mg total) by mouth 2 (two) times daily. 60 capsule 6    RABEprazole (ACIPHEX) 20 mg tablet Take 1 tablet (20 mg total) by mouth 2 (two) times daily. 60 tablet 11    tiZANidine (ZANAFLEX) 4 MG tablet Take 1 tablet (4 mg total) by mouth nightly as needed (muscle pain). 30 tablet 3    albuterol (VENTOLIN HFA) 90 mcg/actuation inhaler Inhale 2 puffs into the lungs every 4 (four) hours as needed for  Wheezing. Rescue 18 g 3    carboxymethylcellulose sodium (REFRESH TEARS) 0.5 % Drop Apply 1-2 drops to every as needed      ergocalciferol (ERGOCALCIFEROL) 50,000 unit Cap Take 1 capsule (50,000 Units total) by mouth every 7 days. 12 capsule 1    ferrous sulfate 325 (65 FE) MG EC tablet Take 325 mg by mouth every morning.      flu vac 2022 65up-swnXT02S,PF, (FLUAD QUAD 2022-23,65Y UP,,PF,) 60 mcg (15 mcg x 4)/0.5 mL Syrg Inject into the muscle. 0.5 mL 0    multivitamin capsule Take 1 capsule by mouth once daily.      mycophenolate mofetil (CELLCEPT) 200 mg/mL SusR Take 5 mLs (1,000 mg total) by mouth 2 (two) times daily. 480 mL 1    PREVIDENT 5000 BOOSTER PLUS 1.1 % Pste SMARTSIG:To Teeth      PREVIDENT 5000 SENSITIVE 1.1-5 % Pste BRUSH FOR 2 MINUTES TWICE PER DAY      sars-cov-2, covid-19, (MODERNA COVID-19) 50 mcg/0.25 ml injection (BOOSTER) Inject into the muscle. 0.25 mL 0       Past Surgical History:   Procedure Laterality Date    24 HOUR IMPEDANCE PH MONITORING OF ESOPHAGUS IN PATIENT NOT TAKING ACID REDUCING MEDICATIONS N/A 3/4/2020    Procedure: IMPEDANCE PH STUDY, ESOPHAGEAL, 24 HOUR, IN PATIENT NOT TAKING ACID REDUCING MEDICATION;  Surgeon: Annamaria Mendoza MD;  Location: [facility] (4TH FLR);  Service: Endoscopy;  Laterality: N/A;  OFF PPI/H2 Blocker   Motility Studies   Hold Narcotics x 1 days   Hold TCA x 1 days  2/26 - LVM attempting to confirm appt  2/27 - Confirmed appt    ANORECTAL MANOMETRY N/A 5/10/2021    Procedure: MANOMETRY, ANORECTAL with balloon expulsion test;  Surgeon: Annamaria Mendoza MD;  Location: [facility] (4TH FLR);  Service: Endoscopy;  Laterality: N/A;  order combined  covid test 5/7 [employer], instructions emailed-[facility]    BREAST BIOPSY      Left, benign    CERVICAL CONIZATION   W/ LASER  1970    COLONOSCOPY      COLONOSCOPY N/A 3/29/2019    Procedure: COLONOSCOPY;  Surgeon: Annamaria Mendoza MD;  Location: [facility] (2ND FLR);  Service: Endoscopy;  Laterality: N/A;     COLONOSCOPY N/A 5/10/2021    Procedure: COLONOSCOPY;  Surgeon: Annamaria Mendoza MD;  Location: [facility] (4TH FLR);  Service: Endoscopy;  Laterality: N/A;  2nd floor-previous scopes done on 2nd floor, gastroparesis  full liquid diet x2 days, clear liquid x1 day prior to procedure  covid test 5/7 [employer], instructions emailed-[facility]  5/6 pt confirmed appt-[facility]    DILATION AND CURETTAGE OF UTERUS      ESOPHAGEAL MANOMETRY WITH MEASUREMENT OF IMPEDANCE N/A 3/4/2020    Procedure: MANOMETRY, ESOPHAGUS, WITH IMPEDANCE MEASUREMENT;  Surgeon: Annamaria Mendoza MD;  Location: [facility] (4TH FLR);  Service: Endoscopy;  Laterality: N/A;  OFF PPI/H2 Blocker   Motility Studies   Hold Narcotics x 1 days   Hold TCA x 1 days    ESOPHAGOGASTRODUODENOSCOPY      ESOPHAGOGASTRODUODENOSCOPY N/A 3/29/2019    Procedure: EGD (ESOPHAGOGASTRODUODENOSCOPY);  Surgeon: Annamaria Mendoza MD;  Location: [facility] (2ND FLR);  Service: Endoscopy;  Laterality: N/A;  pulmonary htn    ESOPHAGOGASTRODUODENOSCOPY N/A 2/2/2021    Procedure: ESOPHAGOGASTRODUODENOSCOPY (EGD);  Surgeon: Annamaria Mendoza MD;  Location: [facility] (2ND FLR);  Service: Endoscopy;  Laterality: N/A;  2nd floor gastroparesis  3 days full liquid diet and 1 day clears  covid test 1/30 primary care, instructions sent to [facility]    ESOPHAGOGASTRODUODENOSCOPY N/A 7/1/2022    Procedure: ESOPHAGOGASTRODUODENOSCOPY (EGD);  Surgeon: Annamaria Mendoza MD;  Location: [facility] ENDO (2ND FLR);  Service: Endoscopy;  Laterality: N/A;  2nd floor-gastroparesis  full liquid diet x3 days, clear liquid diet x1 day prior to procedure  fully vaccinated, instructions sent to [facility]  6/29-pt confirmed new arrival time-vt    EXCISION, LESION, STOMACH, LAPAROSCOPIC N/A 3/23/2023    Procedure: XI ROBOTIC EXCISION, LESION, STOMACH;  Surgeon: Katherine Segura MD;  Location: [facility] OR 2ND FLR;  Service: General;  Laterality: N/A;    HYSTERECTOMY  1990    FRANDY (AUB, Fibroids), ovaries  remain    REPAIR, HERNIA, UMBILICAL N/A 3/23/2023    Procedure: REPAIR, HERNIA, UMBILICAL;  Surgeon: Katherine Segura MD;  [facility];  Service: General;  Laterality: N/A;    RIGHT HEART CATHETERIZATION Right 1/5/2021    Procedure: INSERTION, CATHETER, RIGHT HEART;  Surgeon: Aureliano Sy MD;  Location: [facility] CATH LAB;  Service: Cardiology;  Laterality: Right;    RIGHT HEART CATHETERIZATION Right 3/16/2023    Procedure: INSERTION, CATHETER, RIGHT HEART;  Surgeon: Aureliano Sy MD;  Location: [facility] CATH LAB;  Service: Cardiology;  Laterality: Right;    ROBOT-ASSISTED REPAIR OF HIATAL HERNIA USING DA VERNELL XI N/A 3/23/2023    Procedure: XI ROBOTIC REPAIR, HERNIA, HIATAL;  Surgeon: Katherine Segura MD;  Location: [facility] OR [address] [place] [address];  Service: General;  Laterality: N/A;    VARICOSE VEIN SURGERY      XI ROBOTIC GASTROPEXY N/A 3/23/2023    Procedure: XI ROBOTIC GASTROPEXY;  Surgeon: Katherine Segura MD;  Location: [facility] OR [address] [place] [address];  Service: General;  Laterality: N/A;    XI ROBOTIC PYLOROMYOTOMY N/A 3/23/2023    Procedure: XI ROBOTIC PYLOROMYOTOMY;  Surgeon: Katherine Segura MD;  Location: [facility] OR [address] [place] [address];  Service: General;  Laterality: N/A;       Social History     Socioeconomic History    Marital status:      Spouse name: Lewis    Number of children: 4   Occupational History    Occupation: -retired     Employer: [employer]     Comment:  [employer]     Employer: RETIRED   Tobacco Use    Smoking status: Never     Passive exposure: Never    Smokeless tobacco: Never   Substance and Sexual Activity    Alcohol use: Not Currently     Comment: wine occasionally    Drug use: No    Sexual activity: Yes     Partners: Male     Birth control/protection: None   Other Topics Concern    Are you pregnant or think you may be? No    Breast-feeding No   Social History Narrative         Social Determinants of Health      Financial Resource Strain: Low Risk     Difficulty of Paying Living Expenses: Not hard at all   Food Insecurity: No Food Insecurity    Worried About Running Out of Food in the Last Year: Never true    Ran Out of Food in the Last Year: Never true   Transportation Needs: No Transportation Needs    Lack of Transportation (Medical): No    Lack of Transportation (Non-Medical): No   Physical Activity: Inactive    Days of Exercise per Week: 0 days    Minutes of Exercise per Session: 0 min   Stress: No Stress Concern Present    Feeling of Stress : Only a little   Social Connections: Moderately Isolated    Frequency of Communication with Friends and Family: More than three times a week    Frequency of Social Gatherings with Friends and Family: Never    Attends Roman Catholic Services: Never    Active Member of Clubs or Organizations: No    Attends Club or Organization Meetings: Never    Marital Status:    Housing Stability: Low Risk     Unable to Pay for Housing in the Last Year: No    Number of Places Lived in the Last Year: 1    Unstable Housing in the Last Year: No       OBJECTIVE:     Vital Signs Range (Last 24H):  Temp:  [37 °C (98.6 °F)]   Pulse:  [75]   Resp:  [17]   BP: (124)/(60)   SpO2:  [98 %]       Significant Labs:  Lab Results   Component Value Date    WBC 7.71 05/02/2023    HGB 13.0 05/02/2023    HCT 37.9 05/02/2023     05/02/2023    CHOL 109 (L) 10/07/2022    TRIG 58 10/07/2022    HDL 43 10/07/2022    ALT 7 (L) 03/16/2023    AST 17 03/16/2023     03/24/2023    K 3.9 03/24/2023     03/24/2023    CREATININE 0.6 03/24/2023    BUN 10 03/24/2023    CO2 23 03/24/2023    TSH 3.041 10/07/2022    INR 1.0 06/29/2015    HGBA1C 5.2 10/07/2022       Diagnostic Studies: No relevant studies.    EKG:   Results for orders placed or performed during the hospital encounter of 02/28/23   SCHEDULED EKG 12-LEAD (to Muse)    Collection Time: 02/28/23 11:45 AM    Narrative    Test Reason :  K44.9,K21.00,    Vent. Rate : 077 BPM     Atrial Rate : 077 BPM     P-R Int : 154 ms          QRS Dur : 090 ms      QT Int : 410 ms       P-R-T Axes : 028 -45 079 degrees     QTc Int : 463 ms    Normal sinus rhythm  Left anterior fascicular block  Abnormal ECG  When compared with ECG of 06-SEP-2019 11:54,  No significant change was found  Confirmed by Rigo Mishra MD ([id number]) on 2/28/2023 1:01:54 PM    Referred By: JOE SALDIVAR           Confirmed By:Rigo Mishra MD       2D ECHO:  TTE:  Results for orders placed or performed during the hospital encounter of 12/30/22   Echo   Result Value Ref Range    BSA 1.83 m2    TDI SEPTAL 0.09 m/s    LV LATERAL E/E' RATIO 7.91 m/s    LV SEPTAL E/E' RATIO 9.67 m/s    LA WIDTH 4.70 cm    Right Atrial Pressure (from IVC) 3 mmHg    IVC diameter 1.81 cm    Left Ventricular Outflow Tract Mean Velocity 0.71 cm/s    Left Ventricular Outflow Tract Mean Gradient 2.36 mmHg    TDI LATERAL 0.11 m/s    PV PEAK VELOCITY 1.09 cm/s    LVIDd 5.08 3.5 - 6.0 cm    IVS 0.97 0.6 - 1.1 cm    Posterior Wall 0.89 0.6 - 1.1 cm    LVIDs 3.17 2.1 - 4.0 cm    FS 38 28 - 44 %    LA volume 84.68 cm3    Sinus 3.00 cm    STJ 2.25 cm    Ascending aorta 2.55 cm    LV mass 169.63 g    LA size 3.90 cm    RVDD 3.62 cm    TAPSE 2.70 cm    RV S' 12 cm/s    Left Ventricle Relative Wall Thickness 0.35 cm    AV mean gradient 7 mmHg    AV valve area 2.07 cm2    AV Velocity Ratio 0.66     AV index (prosthetic) 0.67     MV valve area p 1/2 method 4.59 cm2    E/A ratio 0.78     Mean e' 0.10 m/s    E wave deceleration time 165.26 msec    IVRT 110.73 msec    Pulm vein S/D ratio 1.31     LVOT diameter 1.98 cm    LVOT area 3.1 cm2    LVOT peak jung 1.12 m/s    LVOT peak VTI 26.70 cm    Ao peak jung 1.69 m/s    Ao VTI 39.7 cm    Mr max jung 5.67 m/s    LVOT stroke volume 82.17 cm3    AV peak gradient 11 mmHg    TV rest pulmonary artery pressure 42 mmHg    E/E' ratio 8.70 m/s    MV Peak E Jung 0.87 m/s    TR Max Jung 3.14  m/s    MV stenosis pressure 1/2 time 47.92 ms    MV Peak A Dionicio 1.12 m/s    PV Peak S Dionicio 0.81 m/s    PV Peak D Dionicio 0.62 m/s    LV Systolic Volume 40.13 mL    LV Systolic Volume Index 22.3 mL/m2    LV Diastolic Volume 122.76 mL    LV Diastolic Volume Index 68.20 mL/m2    LA Volume Index 47.0 mL/m2    LV Mass Index 94 g/m2    RA Major Axis 5.45 cm    Left Atrium Minor Axis 5.42 cm    Left Atrium Major Axis 5.45 cm    Triscuspid Valve Regurgitation Peak Gradient 39 mmHg    LA Volume Index (Mod) 41.7 mL/m2    LA volume (mod) 75.00 cm3    RA Width 4.20 cm    EF 67 %    Narrative    · Moderate left atrial enlargement.  · Normal central venous pressure (3 mmHg).  · The estimated PA systolic pressure is 42 mmHg.  · The left ventricle is normal in size with  · The estimated ejection fraction is 67%.  · Indeterminate left ventricular diastolic function.  · Normal right ventricular size with normal right ventricular systolic   function.          NELLIE:  No results found. However, due to the size of the patient record, not all encounters were searched. Please check Results Review for a complete set of results.    ASSESSMENT/PLAN:           Pre-op Assessment    I have reviewed the Patient Summary Reports.     I have reviewed the Nursing Notes.    I have reviewed the Medications.     Review of Systems  Anesthesia Hx:  No problems with previous Anesthesia  History of prior surgery of interest to airway management or planning: Denies Family Hx of Anesthesia complications.   Denies Personal Hx of Anesthesia complications.   Cardiovascular:   Hypertension Denies CAD.     Functional Capacity Can you climb two flights of stairs? ==> Yes    Pulmonary:   Denies Asthma.  Denies Sleep Apnea.       Interstitial lung disease, uses inhalers PRN, no home O2. LAst inhaler about a month ago, none needed today.         Renal/:   Denies Chronic Renal Disease.     Hepatic/GI:   Denies PUD. GERD Denies Liver Disease.    Musculoskeletal:            Sjogrens, raynauds, scleroderma with finger and lung involvement       Neurological:   Denies CVA. Denies Seizures.    Endocrine:   Denies Diabetes. Denies Hypothyroidism.        Physical Exam  General: Alert    Airway:  Mallampati: I   Mouth Opening: Normal  TM Distance: Normal  Tongue: Normal  Neck ROM: Normal ROM    Dental:  Intact, Caps / Implants        Anesthesia Plan  Type of Anesthesia, risks & benefits discussed:    Anesthesia Type: Gen ETT  Intra-op Monitoring Plan: Standard ASA Monitors  Post Op Pain Control Plan: multimodal analgesia and IV/PO Opioids PRN  Induction:  IV  Airway Plan: Direct  Informed Consent: Informed consent signed with the Patient and all parties understand the risks and agree with anesthesia plan.  All questions answered.   ASA Score: 3    Ready For Surgery From Anesthesia Perspective.     .       Discharged

## 2023-05-26 NOTE — ANESTHESIA PROCEDURE NOTES
Intubation    Date/Time: 5/26/2023 7:12 AM  Performed by: Nishant Davis MD  Authorized by: Marlene Ta MD     Intubation:     Induction:  Intravenous    Intubated:  Postinduction    Mask Ventilation:  Easy with oral airway    Attempts:  1    Attempted By:  Resident anesthesiologist    Method of Intubation:  Video laryngoscopy    Blade:  West 3    Laryngeal View Grade: Grade IIA - cords partially seen      Difficult Airway Encountered?: No      Complications:  None    Airway Device:  Oral endotracheal tube    Airway Device Size:  7.0    Style/Cuff Inflation:  Cuffed    Inflation Amount (mL):  7    Tube secured:  21    Secured at:  The lips    Placement Verified By:  Capnometry and Revisualization with laryngoscopy    Complicating Factors:  None    Findings Post-Intubation:  BS equal bilateral

## 2023-05-29 LAB
FLOW CYTOMETRY ANTIBODIES ANALYZED - LYMPH NODE: NORMAL
FLOW CYTOMETRY COMMENT - LYMPH NODE: NORMAL
FLOW CYTOMETRY INTERPRETATION - LYMPH NODE: NORMAL

## 2023-05-30 ENCOUNTER — TELEPHONE (OUTPATIENT)
Dept: ALLERGY | Facility: CLINIC | Age: 73
End: 2023-05-30
Payer: MEDICARE

## 2023-05-30 NOTE — TELEPHONE ENCOUNTER
I called Ms. Shah to discuss abnormal immunology labs. The good news is that her labs show normal humoral immune function. Thus, immunoglobulin replacement is not an option for treatment of her recurrent infections. 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 Per my understanding, her rheumatologic disease is severe so decreasing cellcept may not be an option. Thus there are two other options, treatment with daily prophylactic antibiotics such as azithromycin or amoxicillin vs aggressive treatment of infections as they arise such as starting antibiotics at symptom onset instead of watching and waiting. With this second option also suggest masking and handwashing to decrease infections. At this time, patient has decided on treating infections as they arise instead of prophylactic antibiotics. She also notes that infections are typically more frequent during the winter months. We discussed the option of prophylactic antibiotics during winter months. Will place an RTC in 3-4 months to check in on infection frequency. At this time, if this patient presents to any physician/urgent care at the onset of illness, suggest starting antibiotics right away.     Yanna Bahena MD  Allergy/Immunology Fellow    Case discussed with attending, Dr. Ramses Trotter.

## 2023-05-31 DIAGNOSIS — M34.9 SCLERODERMA: ICD-10-CM

## 2023-05-31 DIAGNOSIS — E78.5 HYPERLIPIDEMIA: ICD-10-CM

## 2023-05-31 DIAGNOSIS — M79.18 MYOFASCIAL PAIN: ICD-10-CM

## 2023-05-31 DIAGNOSIS — G89.4 CHRONIC PAIN DISORDER: ICD-10-CM

## 2023-06-01 LAB
COMMENT: NORMAL
FINAL PATHOLOGIC DIAGNOSIS: NORMAL
GROSS: NORMAL
Lab: NORMAL
MICROSCOPIC EXAM: NORMAL

## 2023-06-01 RX ORDER — TIZANIDINE 4 MG/1
4 TABLET ORAL NIGHTLY PRN
Qty: 30 TABLET | Refills: 3 | Status: SHIPPED | OUTPATIENT
Start: 2023-06-01 | End: 2023-07-28 | Stop reason: SDUPTHER

## 2023-06-01 NOTE — TELEPHONE ENCOUNTER
No care due was identified.  [facility] Embedded Care Due Messages. Reference number: [id number].   5/31/2023 8:15:19 PM CDT

## 2023-06-02 ENCOUNTER — HOSPITAL ENCOUNTER (OUTPATIENT)
Dept: RADIOLOGY | Facility: HOSPITAL | Age: 73
Discharge: HOME | End: 2023-06-02
Attending: INTERNAL MEDICINE
Payer: MEDICARE

## 2023-06-02 DIAGNOSIS — J84.9 INTERSTITIAL PULMONARY DISEASE, UNSPECIFIED: ICD-10-CM

## 2023-06-02 DIAGNOSIS — M34.9 SCLERODERMA: ICD-10-CM

## 2023-06-02 PROCEDURE — 71250 CT THORAX DX C-: CPT | Mod: TC,TXP

## 2023-06-02 PROCEDURE — 71250 CT CHEST WITHOUT CONTRAST: ICD-10-PCS | Mod: 26,NTX,, | Performed by: RADIOLOGY

## 2023-06-02 PROCEDURE — 71250 CT THORAX DX C-: CPT | Mod: 26,NTX,, | Performed by: RADIOLOGY

## 2023-06-02 RX ORDER — PRAVASTATIN SODIUM 20 MG/1
20 TABLET ORAL NIGHTLY
Qty: 90 TABLET | Refills: 2 | Status: SHIPPED | OUTPATIENT
Start: 2023-06-02 | End: 2024-03-06 | Stop reason: SDUPTHER

## 2023-06-02 NOTE — PROGRESS NOTES
Subjective:       Patient ID: Yamel Shah is a 72 y.o. female.    Chief Complaint: Annual Exam, Follow-up, and Hypertension    HPI  The patient presents for annual examination and follow-up of medical conditions which include hypertension, iron-deficiency anemia, dysphagia, vitamin-D deficiency, scleroderma, chronic foot pain.  The patient is status post hiatal hernia repair on 03/23/2023.  She is still unable to be meat such as steak.  She notes some mild dysphagia.  She is not experiencing any reflux at the present time.  Bowel movements are regular.    She does have bilateral inguinal hernias.  The right-sided hernia is more prominent.  She is to see General Surgery tomorrow for evaluation.    She has chronic foot pain secondary to chronic ulcers.  She is caring for the ulcers herself at home.  She uses Tylenol No. 3 for severe pain as needed.  She averages 1/2 tablet twice a day.    Screening tests were reviewed.      Immunization record was reviewed.  The the knee he    No interval change in past medical history, family history, or social history since prior evaluations.    Review of Systems   Constitutional:  Negative for fever.   HENT:  Positive for trouble swallowing.    Respiratory:  Positive for cough. Negative for wheezing.    Gastrointestinal:  Negative for reflux.   Musculoskeletal:  Positive for arthralgias.   Integumentary:  Positive for wound (chronic foot ulcers present).          Physical Exam  Vitals and nursing note reviewed.   Constitutional:       General: She is not in acute distress.     Appearance: Normal appearance. She is well-developed.   HENT:      Head: Normocephalic and atraumatic.   Eyes:      General: No scleral icterus.     Extraocular Movements: Extraocular movements intact.      Conjunctiva/sclera: Conjunctivae normal.   Neck:      Thyroid: No thyromegaly.      Vascular: No JVD.   Cardiovascular:      Rate and Rhythm: Normal rate and regular rhythm.      Heart  sounds: Normal heart sounds. No murmur heard.    No friction rub. No gallop.   Pulmonary:      Effort: Pulmonary effort is normal. No respiratory distress.      Breath sounds: Normal breath sounds. No wheezing or rales.   Abdominal:      General: Bowel sounds are normal.      Palpations: Abdomen is soft. There is no mass.      Tenderness: There is no abdominal tenderness.   Musculoskeletal:         General: No tenderness.      Cervical back: Normal range of motion and neck supple.      Right lower leg: No edema.      Left lower leg: No edema.      Comments: The the the the the Decreased hand  noted bilaterally secondary to skin changes from scleroderma.   Lymphadenopathy:      Cervical: No cervical adenopathy.   Skin:     General: Skin is warm and dry.      Findings: No rash.   Neurological:      Mental Status: She is alert and oriented to person, place, and time.   Psychiatric:         Mood and Affect: Mood normal.         Behavior: Behavior normal.             Assessment & Plan:      Yamel was seen today for annual exam and follow-up of medical conditions.  Fasting blood tests will be repeated in 10/23.  The patient will be due for screening mammogram after 05/31/2023.    Prescription for Tylenol No. 3 will be renewed.    Current medications for management of hypertension, hyperlipidemia, and chronic pain will be continued.    Diagnoses and all orders for this visit:    Essential hypertension  -     Comprehensive Metabolic Panel; Future  -     Lipid Panel; Future  -     CBC Auto Differential; Future  -     Hemoglobin A1C; Future  -     TSH; Future  -     Vitamin D; Future    Iron deficiency anemia, unspecified iron deficiency anemia type  -     Comprehensive Metabolic Panel; Future  -     Lipid Panel; Future  -     CBC Auto Differential; Future  -     Hemoglobin A1C; Future  -     TSH; Future  -     Vitamin D; Future    Vitamin D deficiency  -     Vitamin D; Future    Abnormal finding of blood chemistry,  unspecified   -     Hemoglobin A1C; Future    Scleroderma with pulmonary involvement    Gastroesophageal reflux disease, unspecified whether esophagitis present    Skin ulcers of both feet    Encounter for screening mammogram for breast cancer  -     Mammo Digital Screening Bilat w/ Jun; Future    Other orders  -     acetaminophen-codeine 300-30mg (TYLENOL #3) 300-30 mg Tab; Take 1 tablet by mouth every 6 (six) hours as needed (pain).         Follow up in about 6 months (around 10/25/2023).     Aly Rand MD

## 2023-06-02 NOTE — PROGRESS NOTES
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 Will copy Dr. Leigh your pulmonologist so she can review and advise on needed f/u. 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 SARINA

## 2023-06-05 ENCOUNTER — TELEPHONE (OUTPATIENT)
Dept: TRANSPLANT | Facility: CLINIC | Age: 73
End: 2023-06-05
Payer: MEDICARE

## 2023-06-05 ENCOUNTER — PATIENT MESSAGE (OUTPATIENT)
Dept: TRANSPLANT | Facility: CLINIC | Age: 73
End: 2023-06-05
Payer: MEDICARE

## 2023-06-05 DIAGNOSIS — R91.1 SOLITARY PULMONARY NODULE: ICD-10-CM

## 2023-06-05 DIAGNOSIS — R91.1 PULMONARY NODULE/LESION, SOLITARY: ICD-10-CM

## 2023-06-05 DIAGNOSIS — R93.89 ABNORMAL CT OF THE CHEST: Primary | ICD-10-CM

## 2023-06-05 NOTE — TELEPHONE ENCOUNTER
Informed patient that order has been placed for CT chest in three months (September 2023). Will be scheduled, request to .  ----- Message from Roberta Leigh DO sent at 6/5/2023 10:23 AM CDT -----  Thanks Dr. Ahuja.  Dominguez, can you schedule her for a repeat CT chest in 3 months for monitoring of the SHAUNA pulmonary nodule.  Thanks  ----- Message -----  From: Luis Ahuja MD  Sent: 6/2/2023   4:53 PM CDT  To: Roberta Leigh DO    The CT chest shows overall stable appearance of fibrotic NSIP scleroderma lung but a new 0.6 cm nodule in the left upper lobe. Will copy Dr. Leigh your pulmonologist so she can review and advise on needed f/u. 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 SARINA

## 2023-06-09 ENCOUNTER — TELEPHONE (OUTPATIENT)
Dept: TRANSPLANT | Facility: CLINIC | Age: 73
End: 2023-06-09
Payer: MEDICARE

## 2023-06-09 NOTE — TELEPHONE ENCOUNTER
Returned patient call. Left voicemail on home number, attempted cell  [phone], left voicemail there as well.   ----- Message from Gill Garzon sent at 6/9/2023 10:59 AM CDT -----  Regarding: Returning a Missed Call  Returning a Missed Call    Caller: Yamel Shah      Returning call to: Jacquelyn       Caller can be reached @: [phone] (home)      Nature of the call: Patient returning call to Jacquelyn regarding an appt reminder for tomorrow. No appt noted in the portal. Requesting a call back.

## 2023-06-14 ENCOUNTER — OFFICE VISIT (OUTPATIENT)
Dept: SURGERY | Facility: CLINIC | Age: 73
End: 2023-06-14
Payer: MEDICARE

## 2023-06-14 VITALS
BODY MASS INDEX: 25.85 KG/M2 | SYSTOLIC BLOOD PRESSURE: 158 MMHG | DIASTOLIC BLOOD PRESSURE: 71 MMHG | WEIGHT: 155.13 LBS | HEIGHT: 65 IN | HEART RATE: 70 BPM

## 2023-06-14 DIAGNOSIS — K41.90 FEMORAL HERNIA OF RIGHT SIDE: ICD-10-CM

## 2023-06-14 DIAGNOSIS — K40.20 NON-RECURRENT BILATERAL INGUINAL HERNIA WITHOUT OBSTRUCTION OR GANGRENE: Primary | ICD-10-CM

## 2023-06-14 DIAGNOSIS — M34.9 SCLERODERMA WITH PULMONARY INVOLVEMENT: ICD-10-CM

## 2023-06-14 DIAGNOSIS — R59.0 INGUINAL ADENOPATHY: ICD-10-CM

## 2023-06-14 PROCEDURE — 99024 POSTOP FOLLOW-UP VISIT: CPT | Mod: NTX,POP,, | Performed by: SURGERY

## 2023-06-14 PROCEDURE — 99999 PR PBB SHADOW E&M-EST. PATIENT-LVL IV: ICD-10-PCS | Mod: PBBFAC,TXP,, | Performed by: SURGERY

## 2023-06-14 PROCEDURE — 99214 OFFICE O/P EST MOD 30 MIN: CPT | Mod: PBBFAC,TXP | Performed by: SURGERY

## 2023-06-14 PROCEDURE — 99024 PR POST-OP FOLLOW-UP VISIT: ICD-10-PCS | Mod: NTX,POP,, | Performed by: SURGERY

## 2023-06-14 PROCEDURE — 99999 PR PBB SHADOW E&M-EST. PATIENT-LVL IV: CPT | Mod: PBBFAC,TXP,, | Performed by: SURGERY

## 2023-06-14 NOTE — PROGRESS NOTES
[facility]  General Surgery  Follow Up Clinic Note    Subjective:     Yamel Shah is a 72 y.o. female who presents to clinic for follow up s/p robotic repair of right inguinal and femoral hernia repair on 5/26/23. Patient also had right groin lymphadenopathy and had excision of two right inguinal nodes. Final pathology of the nodes showed reactive lymph nodes with histiocytosis and polytypic plasmacytosis. Pt reports that they are feeling well. Tolerating a regular diet and having regular bowel movements. Denies fever, chills, chest pain, and shortness of breath. Pain is well controlled. Denies redness or drainage of incision.    Medications:  Current Outpatient Medications on File Prior to Visit   Medication Sig Dispense Refill    albuterol (VENTOLIN HFA) 90 mcg/actuation inhaler Inhale 2 puffs into the lungs every 4 (four) hours as needed for Wheezing. Rescue 18 g 3    carboxymethylcellulose sodium (REFRESH TEARS) 0.5 % Drop Apply 1-2 drops to every as needed      ergocalciferol (ERGOCALCIFEROL) 50,000 unit Cap Take 1 capsule (50,000 Units total) by mouth every 7 days. 12 capsule 1    ferrous sulfate 325 (65 FE) MG EC tablet Take 325 mg by mouth every morning.      flu vac 2022 [id number], (FLUAD QUAD 2022-23,65Y UP,,PF,) 60 mcg (15 mcg x 4)/0.5 mL Syrg Inject into the muscle. 0.5 mL 0    HYDROcodone-acetaminophen (NORCO) 5-325 mg per tablet Take 1 tablet by mouth every 6 (six) hours as needed for Pain. 18 tablet 0    multivitamin capsule Take 1 capsule by mouth once daily.      mycophenolate mofetil (CELLCEPT) 200 mg/mL SusR Take 5 mLs (1,000 mg total) by mouth 2 (two) times daily. 480 mL 1    nabumetone (RELAFEN) 750 MG tablet Take 1 tablet (750 mg total) by mouth every evening. 30 tablet 3    NIFEdipine (ADALAT CC) 90 MG TbSR TAKE 1 TABLET(90 MG) BY MOUTH EVERY DAY (Patient taking differently: Take by mouth nightly. TAKE 1 TABLET(90 MG) BY MOUTH EVERY DAY (TAKES NIGHTLY  WITH 30 MG TABLET TOTAL 120 MG)) 90 tablet 3    NIFEdipine (PROCARDIA-XL) 30 MG (OSM) 24 hr tablet TAKE 1 TABLET(30 MG) BY MOUTH EVERY DAY (Patient taking differently: Take by mouth nightly. TAKE WITH 90 MG TABLET EVERY NIGHT (TOTAL 120 MG)) 30 tablet 11    pravastatin (PRAVACHOL) 20 MG tablet Take 1 tablet (20 mg total) by mouth every evening. 90 tablet 2    pregabalin (LYRICA) 300 MG Cap Take 1 capsule (300 mg total) by mouth 2 (two) times daily. 60 capsule 6    PREVIDENT 5000 BOOSTER PLUS 1.1 % Pste SMARTSIG:To Teeth      PREVIDENT 5000 SENSITIVE 1.1-5 % Pste BRUSH FOR 2 MINUTES TWICE PER DAY      RABEprazole (ACIPHEX) 20 mg tablet Take 1 tablet (20 mg total) by mouth 2 (two) times daily. 60 tablet 11    sars-cov-2, covid-19, (MODERNA COVID-19) 50 mcg/0.25 ml injection (BOOSTER) Inject into the muscle. 0.25 mL 0    tadalafil (ADCIRCA) 20 mg Tab Take 2 tablets (40 mg total) by mouth once daily. (Patient taking differently: Take 40 mg by mouth every evening.) 60 tablet 11    tiZANidine (ZANAFLEX) 4 MG tablet Take 1 tablet (4 mg total) by mouth nightly as needed (muscle pain). 30 tablet 3     No current facility-administered medications on file prior to visit.         Objective:     PHYSICAL EXAM:  Vital Signs (Most Recent)  Pulse: 70 (06/14/23 1441)  BP: (!) 158/71 (06/14/23 1441)    Physical Exam:  Gen: no apparent distress, awake and alert  CV: regular rate/rhythm  Pulm: non-labored breathing, equal and bilateral chest rise  Abd: soft, non-distended, non-tender  Ext: warm and well perfused, no edema  Skin: warm and dry, no lesions appreciated  Incisions: c/d/I, no surrounding erythema or edema  Neuro: motor and sensation grossly intact and symmetric     Assessment:     Yamel Shah is a 72 y.o. female presenting to clinic today for follow up s/p robotic repair of right inguinal and femoral hernia repair on 5/26/23.    Plan:     - Patient is recovering well  - Continue with lifting restrictions for 4  more weeks  - RTC PRN      Oliverio Landin MD   [facility] PGY-1

## 2023-06-19 ENCOUNTER — LAB VISIT (OUTPATIENT)
Dept: LAB | Facility: HOSPITAL | Age: 73
End: 2023-06-19
Attending: STUDENT IN AN ORGANIZED HEALTH CARE EDUCATION/TRAINING PROGRAM
Payer: MEDICARE

## 2023-06-19 ENCOUNTER — OFFICE VISIT (OUTPATIENT)
Dept: RHEUMATOLOGY | Facility: CLINIC | Age: 73
End: 2023-06-19
Payer: MEDICARE

## 2023-06-19 VITALS
HEIGHT: 65 IN | SYSTOLIC BLOOD PRESSURE: 126 MMHG | WEIGHT: 151.69 LBS | BODY MASS INDEX: 25.27 KG/M2 | DIASTOLIC BLOOD PRESSURE: 63 MMHG | HEART RATE: 69 BPM

## 2023-06-19 DIAGNOSIS — L97.529 SKIN ULCERS OF BOTH FEET: ICD-10-CM

## 2023-06-19 DIAGNOSIS — L97.519 SKIN ULCERS OF BOTH FEET: ICD-10-CM

## 2023-06-19 DIAGNOSIS — B99.9 RECURRENT INFECTIONS: ICD-10-CM

## 2023-06-19 DIAGNOSIS — M34.9 SCLERODERMA: Primary | ICD-10-CM

## 2023-06-19 DIAGNOSIS — M34.9 SCLERODERMA: ICD-10-CM

## 2023-06-19 DIAGNOSIS — J84.9 INTERSTITIAL PULMONARY DISEASE, UNSPECIFIED: ICD-10-CM

## 2023-06-19 DIAGNOSIS — K21.9 GASTROESOPHAGEAL REFLUX DISEASE, UNSPECIFIED WHETHER ESOPHAGITIS PRESENT: ICD-10-CM

## 2023-06-19 DIAGNOSIS — R59.0 INGUINAL ADENOPATHY: ICD-10-CM

## 2023-06-19 LAB
ALBUMIN SERPL BCP-MCNC: 3.6 G/DL (ref 3.5–5.2)
ALP SERPL-CCNC: 119 U/L (ref 55–135)
ALT SERPL W/O P-5'-P-CCNC: 8 U/L (ref 10–44)
ANION GAP SERPL CALC-SCNC: 12 MMOL/L (ref 8–16)
AST SERPL-CCNC: 18 U/L (ref 10–40)
BASOPHILS # BLD AUTO: 0.03 K/UL (ref 0–0.2)
BASOPHILS NFR BLD: 0.6 % (ref 0–1.9)
BILIRUB SERPL-MCNC: 0.3 MG/DL (ref 0.1–1)
BUN SERPL-MCNC: 21 MG/DL (ref 8–23)
CALCIUM SERPL-MCNC: 9.2 MG/DL (ref 8.7–10.5)
CHLORIDE SERPL-SCNC: 110 MMOL/L (ref 95–110)
CK SERPL-CCNC: 44 U/L (ref 20–180)
CO2 SERPL-SCNC: 22 MMOL/L (ref 23–29)
CREAT SERPL-MCNC: 0.7 MG/DL (ref 0.5–1.4)
CRP SERPL-MCNC: 27.6 MG/L (ref 0–8.2)
DIFFERENTIAL METHOD: ABNORMAL
EOSINOPHIL # BLD AUTO: 0.1 K/UL (ref 0–0.5)
EOSINOPHIL NFR BLD: 1 % (ref 0–8)
ERYTHROCYTE [DISTWIDTH] IN BLOOD BY AUTOMATED COUNT: 15.5 % (ref 11.5–14.5)
ERYTHROCYTE [SEDIMENTATION RATE] IN BLOOD BY PHOTOMETRIC METHOD: 72 MM/HR (ref 0–36)
EST. GFR  (NO RACE VARIABLE): >60 ML/MIN/1.73 M^2
GLUCOSE SERPL-MCNC: 109 MG/DL (ref 70–110)
HCT VFR BLD AUTO: 36.8 % (ref 37–48.5)
HGB BLD-MCNC: 12.7 G/DL (ref 12–16)
IMM GRANULOCYTES # BLD AUTO: 0.01 K/UL (ref 0–0.04)
IMM GRANULOCYTES NFR BLD AUTO: 0.2 % (ref 0–0.5)
LYMPHOCYTES # BLD AUTO: 1.3 K/UL (ref 1–4.8)
LYMPHOCYTES NFR BLD: 25.8 % (ref 18–48)
MCH RBC QN AUTO: 33.2 PG (ref 27–31)
MCHC RBC AUTO-ENTMCNC: 34.5 G/DL (ref 32–36)
MCV RBC AUTO: 96 FL (ref 82–98)
MONOCYTES # BLD AUTO: 0.2 K/UL (ref 0.3–1)
MONOCYTES NFR BLD: 4.2 % (ref 4–15)
NEUTROPHILS # BLD AUTO: 3.5 K/UL (ref 1.8–7.7)
NEUTROPHILS NFR BLD: 68.2 % (ref 38–73)
NRBC BLD-RTO: 0 /100 WBC
PLATELET # BLD AUTO: 190 K/UL (ref 150–450)
PMV BLD AUTO: 12.4 FL (ref 9.2–12.9)
POTASSIUM SERPL-SCNC: 3.9 MMOL/L (ref 3.5–5.1)
PROT SERPL-MCNC: 9 G/DL (ref 6–8.4)
RBC # BLD AUTO: 3.82 M/UL (ref 4–5.4)
SODIUM SERPL-SCNC: 144 MMOL/L (ref 136–145)
WBC # BLD AUTO: 5.19 K/UL (ref 3.9–12.7)

## 2023-06-19 PROCEDURE — 99215 OFFICE O/P EST HI 40 MIN: CPT | Mod: PBBFAC,NTX | Performed by: STUDENT IN AN ORGANIZED HEALTH CARE EDUCATION/TRAINING PROGRAM

## 2023-06-19 PROCEDURE — 99214 OFFICE O/P EST MOD 30 MIN: CPT | Mod: S$PBB,GC,NTX, | Performed by: STUDENT IN AN ORGANIZED HEALTH CARE EDUCATION/TRAINING PROGRAM

## 2023-06-19 PROCEDURE — 36415 COLL VENOUS BLD VENIPUNCTURE: CPT | Mod: TXP | Performed by: STUDENT IN AN ORGANIZED HEALTH CARE EDUCATION/TRAINING PROGRAM

## 2023-06-19 PROCEDURE — 99214 PR OFFICE/OUTPT VISIT, EST, LEVL IV, 30-39 MIN: ICD-10-PCS | Mod: S$PBB,GC,NTX, | Performed by: STUDENT IN AN ORGANIZED HEALTH CARE EDUCATION/TRAINING PROGRAM

## 2023-06-19 PROCEDURE — 85025 COMPLETE CBC W/AUTO DIFF WBC: CPT | Mod: NTX | Performed by: STUDENT IN AN ORGANIZED HEALTH CARE EDUCATION/TRAINING PROGRAM

## 2023-06-19 PROCEDURE — 99999 PR PBB SHADOW E&M-EST. PATIENT-LVL V: CPT | Mod: PBBFAC,GC,TXP, | Performed by: STUDENT IN AN ORGANIZED HEALTH CARE EDUCATION/TRAINING PROGRAM

## 2023-06-19 PROCEDURE — 80053 COMPREHEN METABOLIC PANEL: CPT | Mod: NTX | Performed by: STUDENT IN AN ORGANIZED HEALTH CARE EDUCATION/TRAINING PROGRAM

## 2023-06-19 PROCEDURE — 85652 RBC SED RATE AUTOMATED: CPT | Mod: NTX | Performed by: STUDENT IN AN ORGANIZED HEALTH CARE EDUCATION/TRAINING PROGRAM

## 2023-06-19 PROCEDURE — 99999 PR PBB SHADOW E&M-EST. PATIENT-LVL V: ICD-10-PCS | Mod: PBBFAC,GC,TXP, | Performed by: STUDENT IN AN ORGANIZED HEALTH CARE EDUCATION/TRAINING PROGRAM

## 2023-06-19 PROCEDURE — 82550 ASSAY OF CK (CPK): CPT | Mod: TXP | Performed by: STUDENT IN AN ORGANIZED HEALTH CARE EDUCATION/TRAINING PROGRAM

## 2023-06-19 PROCEDURE — 86140 C-REACTIVE PROTEIN: CPT | Mod: NTX | Performed by: STUDENT IN AN ORGANIZED HEALTH CARE EDUCATION/TRAINING PROGRAM

## 2023-06-19 RX ORDER — ACETAMINOPHEN AND CODEINE PHOSPHATE 300; 30 MG/1; MG/1
1 TABLET ORAL EVERY 6 HOURS PRN
COMMUNITY
Start: 2023-06-02 | End: 2023-07-28 | Stop reason: SDUPTHER

## 2023-06-19 NOTE — PROGRESS NOTES
Rapid3 Question Responses and Scores 6/14/2023   MDHAQ Score 0.7   Psychologic Score 1.1   Pain Score 5   When you awakened in the morning OVER THE LAST WEEK, did you feel stiff? No   If Yes, please indicate the number of hours until you are as limber as you will be for the day -   Fatigue Score 4   Global Health Score 5   RAPID3 Score 4.11     Answers submitted by the patient for this visit:  Rheumatology Questionnaire (Submitted on 6/14/2023)  fever: No  eye redness: No  mouth sores: No  headaches: No  shortness of breath: No  chest pain: No  trouble swallowing: Yes  diarrhea: No  constipation: No  unexpected weight change: No  genital sore: No  dysuria: No  During the last 3 days, have you had a skin rash?: No  Bruises or bleeds easily: No  cough: No

## 2023-06-19 NOTE — PROGRESS NOTES
I have personally reviewed the history, confirmed exam findings, and discussed assessment and plan with fellow.    EXAMINATION:  CT CHEST WITHOUT CONTRAST     CLINICAL HISTORY:  Interstitial lung disease;scleroderma; Interstitial pulmonary disease, unspecified     TECHNIQUE:  Low dose axial images, sagittal and coronal reformations were obtained from the thoracic inlet to the lung bases without IV contrast administration.     COMPARISON:  CT chest 12/22/2020, 03/13/2019     FINDINGS:  Base of Neck: No significant abnormality.     Thoracic soft tissues: Unremarkable.     Aorta: Left-sided three-vessel aortic arch.  No aneurysm.  Mild calcific atherosclerosis of the aortic arch and descending aorta.     Heart: Normal size. No effusion.     Pulmonary vasculature: Unremarkable.     Barbara/Mediastinum: No pathologic noelle enlargement.     Airways: Trachea and bronchi are patent.     Lungs/Pleura:     There is redemonstration of basilar and subpleural predominant reticular opacities, volume loss, bronchiolectasis, and bronchiectasis with architectural distortion.  There is a straight edge sign present due to sharply marginated posterobasilar honeycombing.  There are scattered, subtle foci of ground-glass opacities.  No significant detrimental change when compared to prior exam.     Stable scattered foci of tree-in-bud nodularities, as seen within the superior segment of the right lower lobe (4-209).     There is a 0.6 cm solid nodule within the left upper lobe (4-173), new when compared to prior exam.  Noted patchy decreased attenuation of the bilateral lungs during expiration, consistent air trapping.  No pleural fluid or pleural thickening.     Esophagus: Mildly dilated esophagus with dependent fluid, similar to prior exam.     Upper Abdomen: No abnormality of the partially imaged upper abdomen.     Bones: Age appropriate degenerative changes of the spine.  No acute fractures or suspicious osseous lesions.      Impression:     1.  Overall stable appearance of chronic interstitial lung disease changes consistent with reported history of scleroderma.  Although some of the features observed can be seen with usual interstitial pneumonia, the relative lack of progression and the presence of ground-glass opacities suggest an alternative diagnosis such as fibrotic nonspecific interstitial pneumonia.     2.  New 0.6 cm pulmonary nodule of the left upper lobe.  Clinical and oncologic considerations will determine the role and schedule for follow-up imaging.     3.  Persistent fluid-filled esophagus, similar when compared to prior and likely related to reported history of scleroderma.  Clinically increased risk of aspiration.     4.  Additional stable findings as above.     Electronically signed by resident: Katerina Soto  Date:                                            06/02/2023  Time:                                           13:43     Electronically signed by: Sunshine Darden  Date:                                            06/02/2023  Time:                                           16:36      RHC 3/16/23:     The filling pressures on the right and left were normal.    RA 5  PA 35/10 (18)  PCWP 10    CO 6.9 l/min  CI 3.9 l/min/m2.      dcSSc  MRSS: 23  Scleroderma-ILD stable by PFTs and HRCT chest  Venous insufficiency  Chronic foot ulcers  Dysphagia d/t scleroderma esophagus      PFTs       FVC     SVC      RV     DLco    TLC    5/22/23   71.1                   68.5   52.3      66.8  10/7/22   67.6                    63.1  68.9      62.7  1/3/22     67.5   5/25/21   67.8                    59.3   64.1      62.4  1/10/20    74       72  3/12/19    60         64         61      82  3/6/18      64         70         64      112  4/8/16      70         70         89       84  9/4/15      66         62         72       71  1/19/15    66           2/14/14    64                                 87            Standing labs  today  Bivalent Covid booster hold mycophenolate for 1 wk   2 g Na diet  Stop nabumetone which is contributing to edema  Nifedipine also contributory but needs for Raynaud's  Compression stockings which she can put on, elevation, walking, ankle exercises   Mycophenolate 1000mg twice daily  RTC 3 months with standing labs      Answers submitted by the patient for this visit:  Rheumatology Questionnaire (Submitted on 6/14/2023)  fever: No  eye redness: No  mouth sores: No  headaches: No  shortness of breath: No  chest pain: No  trouble swallowing: Yes  diarrhea: No  constipation: No  unexpected weight change: No  genital sore: No  dysuria: No  During the last 3 days, have you had a skin rash?: No  Bruises or bleeds easily: No  cough: No

## 2023-06-19 NOTE — PROGRESS NOTES
"Subjective:       Patient ID: Yamel Shah is a 72 y.o. female.    Chief Complaint: Systemic scleroderma disease    HPI     Yamel Shah is a 72 y.o. 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 She was originally diagnosed in 1983 and has been seeing Dr. Ahuja for over 10 years.( +SSA, +SCL-70, -RNAP3, ILD). She has been on MMF since 2019. Used to follow with Dr. Leigh in pulmonology but now follows with Dr. Merlos. Additionally, she has chronic ulcers in her feet and sees podiatry and vascular. She is s/p laser venous ablation on right foot in 12/2020 by Dr. Claudio. She follows with Dr. Martins in Cardiology and had a right heart cath 1/2021 which showed normal PA pressures. She takes tadalafil.      Interval History:  6/19/23: She reports that she has no complaints. She had her right heart cath in 3/2023 which normal pressures in left and right side of heart. She is s/p 5/2023 hernia repair surgery, had LN biopsy which showed "reactive lymph node with sinus histiocytosis and polytypic plasmacytosis". She reports more swelling in her lower extremities. She denies fever, chills, SOB, or joint complaints.    3/13/23:  Reports that she 3 upper respiratory infections within the last 3 months.  She reports that she had rhino virus back in 12/2022 and was treated with antibiotics per her pulmonologist at that time.  Additionally, in January, when she was visiting her family in [place], she again developed symptoms of an upper respiratory infection.  She went to the ED and was prescribed Augmentin and azithromycin for presumed pneumonia.  Most recently, she was seen in urgent care on [facility] for similar symptoms upper respiratory infection with chills and myalgias and was found to be positive for the flu influenza B on " "3/05/2023.  She was given Levaquin for 7 days.  She reports that she is held her CellCept all this time. She has held MMF for the past day 8. She scheduled to have a right heart cath on 3/16/23. Hernia repair is on 3/23/23.  She missed her last lab appointment due to the flu.    12/19/22: Reports that she has been having a upper respiratory infection. She reports a lingering cough since thanksgiving and she was given an antiboitic. She does report improvement since this time but does still a productive cough at night. She endorses mild wheezing. She has been holding her cellcept for the past few weeks. She states that the wound on her feet are improving. She is scheduled for a hernia repair in January and asks for instructions of how to hold her MMF at that time. She denies fevers, diarrhea, SOB, joint pain.     Current Treatment Regimen:   She is currently on MMF 1000mg BID (liquid suspension)     Review of Systems   Constitutional:  Negative for fever and unexpected weight change.   HENT:  Positive for trouble swallowing. Negative for mouth sores.    Eyes:  Negative for redness.   Respiratory:  Negative for cough and shortness of breath.    Cardiovascular:  Negative for chest pain.   Gastrointestinal:  Negative for constipation and diarrhea.   Genitourinary:  Negative for dysuria and genital sores.   Musculoskeletal:  Negative for joint swelling.   Skin:  Positive for rash.   Neurological:  Negative for headaches.   Hematological:  Bruises/bleeds easily.       Objective:   /63   Pulse 69   Ht 5' 5" (1.651 m)   Wt 68.8 kg (151 lb 10.8 oz)   BMI 25.24 kg/m²      Physical Exam   Constitutional: She is oriented to person, place, and time. No distress.   HENT:   Head: Normocephalic and atraumatic.   Right Ear: External ear normal.   Left Ear: External ear normal.   Nose: Nose normal.   Mouth/Throat: Oropharynx is clear and moist.   Eyes: Conjunctivae are normal. Right eye exhibits no discharge. Left eye " exhibits no discharge. No scleral icterus.   Neck: No thyromegaly present.   Cardiovascular: Normal rate, regular rhythm and normal heart sounds.   No murmur heard.  Pulmonary/Chest: Effort normal and breath sounds normal. No respiratory distress. She has no wheezes. She exhibits no tenderness.   Crackles b/l stable from prior exam   Abdominal: Soft. Bowel sounds are normal. She exhibits no distension. There is no abdominal tenderness.   Musculoskeletal:         General: Swelling and deformity present. No tenderness. Normal range of motion.      Comments: Skin tightness  Claw hand deformity, flexion contractures b/l  Skin score: 23  Chronic LE ulcers.  3+ pitting of the lower extremities.     Neurological: She is alert and oriented to person, place, and time.   Skin: Skin is warm and dry. No rash noted. She is not diaphoretic. No erythema.   Psychiatric: Mood and affect normal.   Vitals reviewed.                  Modified Skin Score:  Skin score head= 1  Chest= 1  abdomen=0         Right upper arm=1  Left upper arm=1  Right lower arm=0 Left lower arm: 1  Right hand=3 Left hand=1  R Fingers= 3        L Fingers=3  Right upper leg= 0  Left upper leg=0  Right lower leg= 1  Left lower leg= 1  Right foot= 3            Left foot= 3    Modified Skin Score: 23    Assessment:       1. Scleroderma    2. Interstitial pulmonary disease, unspecified    3. Skin ulcers of both feet    4. Inguinal adenopathy    5. Recurrent infections    6. Gastroesophageal reflux disease, unspecified whether esophagitis present              Plan:       Problem List Items Addressed This Visit          Immunology/Multi System    Scleroderma - Primary    Relevant Orders    Echo    Protein/Creatinine Ratio, Urine    Sedimentation rate (Completed)    Protein/Creatinine Ratio, Urine (Completed)    Urinalysis    C-Reactive Protein (Completed)    CBC Auto Differential (Completed)    Comprehensive Metabolic Panel (Completed)    CK (Completed)        "Orthopedic    Skin ulcers of both feet     Other Visit Diagnoses       Interstitial pulmonary disease, unspecified        Inguinal adenopathy        Recurrent infections        Gastroesophageal reflux disease, unspecified whether esophagitis present              Scleroderma:  initially diagnosed in 1983, has been seeing Dr. Ahuja for over 10 years.( +SSA, +SCL-70, -RNAP3, ILD, pHTN).   Organ involvement: Cardiac (pHTN), pulmonary (ILD- NSIP), Raynauds  mRSS: 23  Currently MMF 1000mg BID (liquid suspension)  Labs today    Pulmonary Hypertension: followed by Dr. Martins in heart transplant,   Last echo 12/2022 with hgmm 42, EF 67%, and indeterminate diastolic dysfunction  Last RHC in 3/2023 without elevated left or right pressures  Continue tadalafil per transplant    ILD (NSIP):  Last CT chest with stable from 2023.  last appointment with Dr. Leigh in 5/2023, on   1/2020:   FVC 74.3%,   FEV1/FVC  104.5%,   TLC 62.4%    DLCO  71.0%   9/2020:   FVC 72.3%    FEV1/FVC  99.8%      TLC 63.3%    DLCO  66.2%  5/2021:   FVC 67.8%    FEV1/FVC  101.3%    TLC 62.4%    DLCO  66.7%                  10/2022: FVC 67.6%,   FEV1/FVC  104.1%,   TLC 62.7%    DLCO  55.3%  5/2023:   FVC 71.1%    FEV1/FVC  105.6%    TLC 66.8%    DLCO  52.3%     Chronic bilateral foot ulcers and LE swelling: Chronic bilateral Foot ulcers; seen last by vascular surgery Dr. Claudio in 12/2021 and noted to ulcers/swelling are likely due to chronic venous insufficiency; last seen Dr. Claudio in 4/2023, no intervention is needed  Recommended compression stockings and elevation    Inguinal Adenopathy:  Soft history tissue ultrasound from 4/2023 showed inguinal hernia and mildly enlarged lymph nodes.  Now s/p hernia repair and lymph node removal on 5/2023. Pathology from lymph nodes showed: "reactive lymph node with sinus histiocytosis and polytypic plasmacytosis"  Ddx includes infection vs reactive from scleroderma  Repeat soft tissue US in 3 months to assess for " improvement        Follow-up in 3 months with labs before visit      Patient seen and evaluated with Dr. Ahuja

## 2023-06-20 ENCOUNTER — TELEPHONE (OUTPATIENT)
Dept: RHEUMATOLOGY | Facility: CLINIC | Age: 73
End: 2023-06-20
Payer: MEDICARE

## 2023-06-20 DIAGNOSIS — R80.9 PROTEINURIA, UNSPECIFIED TYPE: Primary | ICD-10-CM

## 2023-06-28 ENCOUNTER — SPECIALTY PHARMACY (OUTPATIENT)
Dept: PHARMACY | Facility: CLINIC | Age: 73
End: 2023-06-28
Payer: MEDICARE

## 2023-06-28 NOTE — TELEPHONE ENCOUNTER
Specialty Pharmacy - Refill Coordination    Specialty Medication Orders Linked to Encounter      Flowsheet Row Most Recent Value   Medication #1 mycophenolate mofetil (CELLCEPT) 200 mg/mL SusR (Order#[id number], Rx#[id number])            Refill Questions - Documented Responses      Flowsheet Row Most Recent Value   Patient Availability and HIPAA Verification    Does patient want to proceed with activity? Yes   HIPAA/medical authority confirmed? Yes   Relationship to patient of person spoken to? Self   Refill Screening Questions    Would patient like to speak to a pharmacist? No   When does the patient need to receive the medication? 06/29/23   Refill Delivery Questions    How will the patient receive the medication? MEDRx   When does the patient need to receive the medication? 06/29/23   Shipping Address Home   Address in [facility] confirmed and updated if neccessary? Yes   Expected Copay ($) 15   Is the patient able to afford the medication copay? Yes   Payment Method CC on file   Days supply of Refill 48   Supplies needed? No supplies needed   Refill activity completed? Yes   Refill activity plan Refill scheduled   Shipment/Pickup Date: 06/28/23            Current Outpatient Medications   Medication Sig    acetaminophen-codeine 300-30mg (TYLENOL #3) 300-30 mg Tab Take 1 tablet by mouth every 6 (six) hours as needed.    albuterol (VENTOLIN HFA) 90 mcg/actuation inhaler Inhale 2 puffs into the lungs every 4 (four) hours as needed for Wheezing. Rescue    carboxymethylcellulose sodium (REFRESH TEARS) 0.5 % Drop Apply 1-2 drops to every as needed    ergocalciferol (ERGOCALCIFEROL) 50,000 unit Cap Take 1 capsule (50,000 Units total) by mouth every 7 days.    ferrous sulfate 325 (65 FE) MG EC tablet Take 325 mg by mouth every morning.    flu vac 2022 [id number], (FLUAD QUAD 2022-23,65Y UP,,PF,) 60 mcg (15 mcg x 4)/0.5 mL Syrg Inject into the muscle.    HYDROcodone-acetaminophen (NORCO) 5-325 mg per tablet  Take 1 tablet by mouth every 6 (six) hours as needed for Pain.    multivitamin capsule Take 1 capsule by mouth once daily.    mycophenolate mofetil (CELLCEPT) 200 mg/mL SusR Take 5 mLs (1,000 mg total) by mouth 2 (two) times daily.    NIFEdipine (ADALAT CC) 90 MG TbSR TAKE 1 TABLET(90 MG) BY MOUTH EVERY DAY (Patient taking differently: Take by mouth nightly. TAKE 1 TABLET(90 MG) BY MOUTH EVERY DAY (TAKES NIGHTLY WITH 30 MG TABLET TOTAL 120 MG))    NIFEdipine (PROCARDIA-XL) 30 MG (OSM) 24 hr tablet TAKE 1 TABLET(30 MG) BY MOUTH EVERY DAY (Patient taking differently: Take by mouth nightly. TAKE WITH 90 MG TABLET EVERY NIGHT (TOTAL 120 MG))    pravastatin (PRAVACHOL) 20 MG tablet Take 1 tablet (20 mg total) by mouth every evening.    pregabalin (LYRICA) 300 MG Cap Take 1 capsule (300 mg total) by mouth 2 (two) times daily.    PREVIDENT 5000 BOOSTER PLUS 1.1 % Pste SMARTSIG:To Teeth    PREVIDENT 5000 SENSITIVE 1.1-5 % Pste BRUSH FOR 2 MINUTES TWICE PER DAY    RABEprazole (ACIPHEX) 20 mg tablet Take 1 tablet (20 mg total) by mouth 2 (two) times daily.    sars-cov-2, covid-19, (MODERNA COVID-19) 50 mcg/0.25 ml injection (BOOSTER) Inject into the muscle.    tadalafil (ADCIRCA) 20 mg Tab Take 2 tablets (40 mg total) by mouth once daily. (Patient taking differently: Take 40 mg by mouth every evening.)    tiZANidine (ZANAFLEX) 4 MG tablet Take 1 tablet (4 mg total) by mouth nightly as needed (muscle pain).   Last reviewed on 6/19/2023  8:31 AM by Jessica Wilson MD    Review of patient's allergies indicates:   Allergen Reactions    Sulfa (sulfonamide antibiotics)      Other reaction(s): Rash    Tramadol Itching    Last reviewed on  6/19/2023 8:31 AM by Jessica Wilson      Tasks added this encounter   No tasks added.   Tasks due within next 3 months   5/24/2023 - Refill Coordination Outreach (1 time occurrence)     Damon Gusman, PharmD  [facility]  [address] [facility]  [id number]  Phone: [phone]  Fax: [phone]

## 2023-07-05 ENCOUNTER — HOSPITAL ENCOUNTER (OUTPATIENT)
Dept: RADIOLOGY | Facility: HOSPITAL | Age: 73
Discharge: HOME | End: 2023-07-05
Attending: INTERNAL MEDICINE
Payer: MEDICARE

## 2023-07-05 DIAGNOSIS — Z12.31 ENCOUNTER FOR SCREENING MAMMOGRAM FOR BREAST CANCER: ICD-10-CM

## 2023-07-05 PROCEDURE — 77067 SCR MAMMO BI INCL CAD: CPT | Mod: 26,NTX,, | Performed by: RADIOLOGY

## 2023-07-05 PROCEDURE — 77063 MAMMO DIGITAL SCREENING BILAT WITH TOMO: ICD-10-PCS | Mod: 26,NTX,, | Performed by: RADIOLOGY

## 2023-07-05 PROCEDURE — 77067 SCR MAMMO BI INCL CAD: CPT | Mod: TC,TXP

## 2023-07-05 PROCEDURE — 77063 BREAST TOMOSYNTHESIS BI: CPT | Mod: 26,NTX,, | Performed by: RADIOLOGY

## 2023-07-05 PROCEDURE — 77067 MAMMO DIGITAL SCREENING BILAT WITH TOMO: ICD-10-PCS | Mod: 26,NTX,, | Performed by: RADIOLOGY

## 2023-07-07 DIAGNOSIS — M34.89 CUTANEOUS SCLERODERMA: ICD-10-CM

## 2023-07-07 DIAGNOSIS — G60.9 IDIOPATHIC NEUROPATHY: ICD-10-CM

## 2023-07-07 DIAGNOSIS — G89.4 CHRONIC PAIN DISORDER: ICD-10-CM

## 2023-07-07 RX ORDER — PREGABALIN 300 MG/1
300 CAPSULE ORAL 2 TIMES DAILY
Qty: 60 CAPSULE | Refills: 6 | Status: SHIPPED | OUTPATIENT
Start: 2023-07-07 | End: 2024-01-11 | Stop reason: SDUPTHER

## 2023-07-24 ENCOUNTER — PES CALL (OUTPATIENT)
Dept: ADMINISTRATIVE | Facility: CLINIC | Age: 73
End: 2023-07-24
Payer: MEDICARE

## 2023-07-25 ENCOUNTER — PES CALL (OUTPATIENT)
Dept: ADMINISTRATIVE | Facility: CLINIC | Age: 73
End: 2023-07-25
Payer: MEDICARE

## 2023-07-28 ENCOUNTER — OFFICE VISIT (OUTPATIENT)
Dept: PAIN MEDICINE | Facility: CLINIC | Age: 73
End: 2023-07-28
Payer: MEDICARE

## 2023-07-28 DIAGNOSIS — G60.9 IDIOPATHIC NEUROPATHY: ICD-10-CM

## 2023-07-28 DIAGNOSIS — M50.30 DDD (DEGENERATIVE DISC DISEASE), CERVICAL: ICD-10-CM

## 2023-07-28 DIAGNOSIS — M79.18 MYOFASCIAL PAIN: ICD-10-CM

## 2023-07-28 DIAGNOSIS — M34.89 CUTANEOUS SCLERODERMA: ICD-10-CM

## 2023-07-28 DIAGNOSIS — M47.812 CERVICAL SPONDYLOSIS: ICD-10-CM

## 2023-07-28 DIAGNOSIS — G89.4 CHRONIC PAIN DISORDER: Primary | ICD-10-CM

## 2023-07-28 PROCEDURE — 99213 PR OFFICE/OUTPT VISIT, EST, LEVL III, 20-29 MIN: ICD-10-PCS | Mod: 95,,, | Performed by: NURSE PRACTITIONER

## 2023-07-28 PROCEDURE — 99213 OFFICE O/P EST LOW 20 MIN: CPT | Mod: 95,,, | Performed by: NURSE PRACTITIONER

## 2023-07-28 RX ORDER — TIZANIDINE 4 MG/1
4 TABLET ORAL NIGHTLY PRN
Qty: 30 TABLET | Refills: 3 | Status: SHIPPED | OUTPATIENT
Start: 2023-07-28 | End: 2023-10-27 | Stop reason: SDUPTHER

## 2023-07-28 RX ORDER — ACETAMINOPHEN AND CODEINE PHOSPHATE 300; 30 MG/1; MG/1
1 TABLET ORAL DAILY PRN
Qty: 30 TABLET | Refills: 0 | Status: SHIPPED | OUTPATIENT
Start: 2023-07-28 | End: 2023-08-27

## 2023-07-28 NOTE — PROGRESS NOTES
Chronic Pain - Follow Up  Chronic Pain-Tele-Medicine-Established Note (Follow up visit)        The patient location is: Home  The chief complaint leading to consultation is: pain  Visit type: Virtual visit with synchronous audio and video  Total time spent with patient: 25 min  Each patient to whom he or she provides medical services by telemedicine is:  (1) informed of the relationship between the physician and patient and the respective role of any other health care provider with respect to management of the patient; and (2) notified that he or she may decline to receive medical services by telemedicine and may withdraw from such care at any time.        Referring Physician: No ref. provider found    Chief Complaint:   Chief Complaint   Patient presents with    Leg Pain        SUBJECTIVE: Disclaimer: This note has been generated using voice-recognition software. There may be typographical errors that have been missed during proof-reading    Interval History 7/28/2023:  The patient returns to clinic today for follow up of neck and foot pain via virtual visit. She continues to report bilateral foot pain. This is worse at night, described as burning in nature. She does report some new ulcers to her feet. She does have a home wound care regimen. She continues to report intermittent neck pain. She is taking Lyrica, Zanaflex, and Relafen with benefit. She also takes Tylenol #3 as needed for severe pain with benefit and without adverse effects. She denies any other health changes.     Interval History 4/28/2023:  The patient returns to clinic today for follow up of neck and foot pain. Since last visit, she has had pneumonia. She also had hiatal hernia repair. She continues to report bilateral foot pain. This is burning in nature. Her pain is worse at night. She reports intermittent neck pain. She continues to take Lyrica, Zanaflex, and Relafen with benefit. She also takes Tylenol #3 for severe pain. She denies any  adverse effects. She denies any other health changes. Her pain today is 4/10.    Interval History 12/30/2022:  The patient returns to clinic today for follow up of neck and foot pain. She reports increased foot pain over the last 2 months. She continues to report ulcers to her feet. She reports bilateral foot pain. Her pain is worse at night. She reports intermittent neck pain. She is taking Lyrica, Zanaflex, and Relafen with benefit. She also takes Tylenol #3 with benefit. She denies any adverse effects. She denies any other health changes. Her pain today is 6/10.    Interval History 8/26/2022:  The patient returns to clinic today for follow up neck and foot pain. She reports increased pain over the last few days. She attributes this as she is out of Lyrica. She continues to report bilateral foot pain. She continues to have wounds. She continues to perform wound care. She continues to report intermittent neck pain. She continues to take Lyrica, Zanaflex, and Relafen with benefit. She continues to take Tylenol #3 for severe pain as needed with benefit. She denies any adverse effects. She denies any other health changes. Her pain today is 4/10.    Interval History 5/26/2022:  The patient returns to clinic today for follow up of neck and foot pain via virtual visit. She continues to report bilateral foot pain. She continues to report ulcers to her feet. She continues to report neck pain with intermittent radiating pain into her arms. She continues to report benefit with current medications. She is taking Lyrica, Zanaflex, and Relafen. She also takes Tylenol #3 with benefit. She denies any adverse effects with these medications. She continues to perform a home exercise routine. She denies any other health changes.     Interval History 2/15/22  Patient presents in follow up. Was previously Dr. King patient with primary complaints of neck and foot pain. She reports her pain has been stable since her last visit. She  did have covid in January and stopped all of ehr pain medications. She got an antibody infusion. She has fully recovered. She restarted her pain medications and reports that they are effective. She is taking Tylenol 3 daily, nambumetome 750 mg BID, lyrica 150 mg BID and Zanaflex 4 mg TID. She did do PT for her neck November to January per her report. She continues with mild neck pain without radiation into the arms or hands. Pain is worse with sidebending and rotation to the right and better with rest and medications. She denies any numbness tingling weakness or b/b incontinence.    Interval History 1/4/2022:  The patient returns to clinic today for follow up of foot pain via virtual visit. She continues to report foot pain and wounds. She continues to follow up with podiatry and wound care. She reports increased neck pain. This pain is tight and aching in nature. She denies any radicular arm pain. She is currently taking Relafen, Lyrica, and Tylenol #3 with benefit and without adverse effects. She reports limited benefit with Zanaflex. She denies any other health changes.     Interval History 12/8/2021:  The patient returns to clinic today for foot pain via virtual visit. She continues to report foot pain. She does have foot wounds. She recently saw Dr. Claudio in [facility]. He does not recommend any vascular interventions at this time. She continues to follow up with Podiatry and wound care. She continues to take Zanaflex, Relafen, Lyrica and Tylenol #3 with benefit. She denies any adverse effects. She denies any other health changes. Her pain today is 7/10.    Interval History 11/8/2021:  The patient returns to clinic today for follow up of foot pain via virtual visit. At last visit, she was starting on Tylenol #3. She does find benefit with this. She sometimes breaks this in half. She continues to take Zanaflex, Relafen, and Lyrica. She continues to report bilateral foot pain and ulcers. She continues to follow up  with podiatry. Her pain is worse at night. She denies any other health changes. Her pain today is 7/10.    Interval History 10/20/2021:  The patient returns to clinic today for follow up of foot pain. At last visit, she was started on Tramadol. She did have relief but had significant side effects. She experienced sedation, itching, headaches, and decreased appetite. This has resolved after stopping the medication. She continues to report foot pain. This pain is worse at night. She continues to follow up with podiatry. She continues to take Tizanidine, Relafen, and Lyrica with some benefit. She denies any adverse effects. She denies any other health changes. Her pain today is 8/10.    Interval history 10/06/2021:  Since previous encounter the patient continues to have nonhealing wound has been followed up with wound care and is scheduled to follow with Rheumatology for the wounds in her legs she was referred to podiatry with stated that they have done nothing different me that she with doing on her own.  She continues to have neck pain and bilateral foot pain.  She has been taking tizanidine Relafen Tylenol p.m. and Lyrica 600 mg per day.  She states that all these medications are helpful.    Interval History 6/3/2021:  The patient returns to clinic today for follow up of foot pain. She continues to report left foot pain secondary to ulcer. She is seeing Wound Care. She describes this pain as burning in nature. Her pain is worse with prolonged activity. She reports intermittent neck pain. She continues to take Zanaflex. She reports limited benefit with increased Lyrica dose. She denies any other health changes. Her pain today is 5/10.    Interval History 5/5/2021:  The patient returns to clinic today for follow up of foot pain. She reports a new ulcer on her left foot. She is seeing Wound Care. She reports increased pain with this new ulcer. She describes this pain as burning. She continues to report neck pain that  is tight and aching. She denies any radicular arm pain. Her pain is worse with prolonged activity, especially turning her head to the side. She continues to perform a home exercise routine. She continues to take Lyrica and Zanaflex with benefit. She denies any other health changes. Her pain today is 5/10.    Interval History 2/8/2021:  The patient returns to clinic today for follow up of neck and foot pain. She reports that her neck pain has significantly improved since last visit. She has recently completed physical therapy for this. She is performing a home stretching routine. She reports intermittent neck pain that is tight and aching in nature. She denies any radicular arm pain. She continues to report bilateral foot pain. This is worse at night. She continues to take Lyrica and Zanaflex with benefit. She denies any other health changes. Her pain today is 4/10.    Interval History 1/8/2021:  The patient returns to clinic today for follow up of neck and foot pain. She continues to report neck pain that is tight and aching in nature. She denies any radicular pain. She continues to participate in physical therapy and a home exercise routine. She continues to report bilateral foot pain, worse at night. She reports that increased Lyrica dose has provided improved benefit. She also takes Zanaflex with benefit. She denies any other health changes. Her pain today is 3/10.    Interval History 11/13/2020:  The patient returns to clinic today for follow up of neck and foot pain. She continues to report bilateral foot pain. Since last visit, she had a venous ablation procedure on her right leg through Vascular. She is scheduled for the left side in the next month. She continues to report bilateral foot pain, worse at night. She continues to report neck pain that is tight and aching in nature. She denies any radicular pain. Her pain is worse flexion and turning her head to the side. She is currently participating in physical  therapy with some benefit. She continues to take Lyrica but does ask if this could be increased. She continues to take Zanaflex with benefit. She denies any other health changes. Her pain today is 5/10    Interval History 10/2/2020:  The patient returns to clinic today for follow up of foot pain. She reports increased bilateral foot pain over the last few months. This pain is burning in nature. This pain is worse at night. She continues to have ulcers to her feet related to her scleroderma. She would like to restart the Lyrica. She also reports increased neck pain that is sore and aching in nature. She denies any radiating arm pain. This is worse with turning her head and at night. She denies any other health changes. Her pain today is 7/10.    Interval History 6/5/2020:  The patient requests audio visit today for follow up of bilateral foot pain. She reports intermittent foot pain that is tolerable at this time. She has discontinued Lyrica as of April. She continues to have ulcers to her feet. She does have wound care. She denies any other health changes.     Interval History 1/17/2020:  The patient returns to clinic today for follow up. She continues to report bilateral foot pain. She describes this pain as burning and tingling in nature. At last visit, we decreased her Lyrica dose to 100 mg at night. She reports increased pain since then. She would like to go to back to the 150 mg dose. She continues to have ulcers to her feet, left worse than right. She continues to participate in a home wound care program. She denies any other health changes. Her pain today is 6/10.    Interval History 12/17/2019:  The patient returns to clinic today for follow up. She reports improving foot pain. She reports improved healing ulcers to her left foot. She continues to report right foot pain that is burning and tingling in nature. She continues to participate in a home wound care routine. She continues to take Lyrica. She asks  about decreasing this. She denies any other health changes. Her pain today is 5/10.    Interval History 9/17/2019:  The patient returns to clinic today for follow up. She continues to report bilateral foot pain that is burning and tingling in nature. Her pain is worse with sitting and at night. She continues to have slow healing ulcers to both feet. She continues to perform a home wound care program. She is no longer taking Gabapentin which was previously called in. She is now taking Pregabalin generic with benefit. She denies any other health changes. Her pain today is 4/10.    Interval History 6/13/2019:  The patient returns to clinic for follow up for bilateral foot pain. She continues to report bilateral foot pain that is burning in nature. She continues to have slow healing wounds to both feet from ulcers. She continues to take Lyrica once daily but reports increased pain. She continues to take Vitamin B12. She denies any other health changes. Her pain today is 8/10.    Interval History 3/13/2019:  The patient returns to clinic today for follow up. She continues to report bilateral foot pain that is burning and tingling in nature. Her pain is worse with prolonged walking and standing. She continues to have bilateral foot wounds. She reports that these wounds have significantly improved since last visit. She is currently taking Vitamin B12 with benefit. She continues to report benefit with Lyrica. She is currently taking this once daily. She denies any other health changes. Her pain today is 5/10.    Interval History 2/6/2019:  The patient returns to clinic today for follow up. She reports that her bilateral foot wounds have significantly improved since last visit. She does report some significant improvement in her foot pain. She reports intermittent bilateral foot pain especially with prolonged walking. She describes this pain as heavy and tingling in nature. She is no longer taking Celebrex. She is currently  taking Lyrica 150 mg BID with benefit. She does report that the Lyrica is expensive for her. She denies any other health changes. Her pain today is 5/10.    Interval History 12/5/18:  Patient returns to clinic today for follow up. She reports that since increasing her medications, she is having significant improvement in pain during the day time. She is currently taking Lyrica 75mg TID and Celebrex 200mg TID. However, she states that the burning  And tingling pain in both her feet increase in the evening around 6 or 7pm. She is unable to sleep during the nights due to the pain. She is still wearing her bilateral boots due to non healing ulcers from her scleroderma.     Interval History 8/30/2018:  The patient returns to clinic today for follow up. She continues to report bilateral foot pain that is burning and tingling in nature. She reports that this pain is worse at night. She is currently taking Lyrica 75 mg BID with limited relief. She did not have any benefit with Mobic. She continues to take Aleve with some benefit. She continues to have nonhealing ulcers. She wears an ACE bandage to her right calf, as well as boots to her bilateral feet. She denies any other health changes. Her pain today is 7/10.     Interval History 7/11/2018:  Since previous encounter she has weaned from gabapentin to 600mg / day and did not notice significant worsening of pain, but was having somnelence from higher doses of the medication in the past.  We are weaning in an attempt to trial Lyrica as an alternative to gabapentin.  Tramadol causes significant sedation, limiting its use.  She has discontinued the use of the tramadol.  Additionally she does have benefit from anti-inflammatory medication, has been using aleve, has not trialed CANTRELL-2 inhibitors in the past.    Interval history 05/16/2018:      The patient has had x-rays of the lumbar spine which did not reveal any evidence of significant neuroforaminal stenosis with degenerative  disc disease.  There is mild facet arthropathy.  She has escalated her gabapentin and is currently taking 2100 mg of gabapentin per day without any noticeable improvement but daytime somnolence.  She continues to have nonhealing ulcers and topical pain cream is helping to a limited degree over her leg in areas where there is no skin disruption.    Initial encounter:    Yamel Shah presents to the clinic for the evaluation of foot pain. The pain started 2 years ago following ulcers and symptoms have been worsening.    Brief history: History of scleroderma    Pain Description:    The pain is located in the both feet in the area of slowly healing chronic foot ulcers which were partly from venous insufficiency and stasis associated with her scleroderma.  In her right lower extremity she describes radicular symptoms in the L4/5 distribution.    At BEST  5/10     At WORST  10/10 on the WORST day.      On average pain is rated as 8/10.     Today the pain is rated as 8/10    The pain is described as burning, sharp, shooting and constant       Symptoms interfere with daily activity, sleeping and work.     Exacerbating factors: Laying, Walking, Night Time, Morning and Getting out of bed/chair.      Mitigating factors medications.     Patient denies night fever/night sweats, urinary incontinence, bowel incontinence, significant weight loss, significant motor weakness and loss of sensations.  Patient denies any suicidal or homicidal ideations    Pain Medications:  Current:  Lyrica 300 mg daily  Zanaflex  Relafen  Tylenol #3    Tried in Past:  NSAIDs -Aleve, Mobic, Celebrex  TCA -Never  SNRI -Never  Anti-convulsants - Gabapentin, Lyrica  Muscle Relaxants -Never  Opioids-Tramadol    Physical Therapy/Home Exercise: no       report:  Reviewed and consistent with medication use as prescribed.    Pain Procedures: none    Chiropractor -never  Acupuncture - never  TENS unit -never  Spinal decompression -never  Joint  replacement -never    Imaging:   Xray Cervical Spine 12/6/2019:  FINDINGS:  Odontoid prevertebral soft tissues and posterior elements are intact.  Neural foramina are patent.  No fracture dislocation bone destruction seen.  There is mild DJD.     Impression:     Mild DJD.    X-ray lumbar spine 6/1/2016:  2 views: Alignment is normal. There is mild DJD. No fracture dislocation bone destruction seen.   Impression      Mild DJD.     Xray lumbar spine 4/2018:  COMPARISON:  June 2016    FINDINGS:  There is slight curvature of the lumbar spine.  The vertebral bodies are normally aligned and normal in height.  Mild disc space narrowing present at L5-S1.  There is mild facet degenerative change in the lower spine.  No significant osteophytic spurring present.  There is no change in alignment with flexion or extension.      Past Medical History:   Diagnosis Date    Abnormal Pap smear     Acid reflux     Allergy     Arthritis     Encounter for blood transfusion     GERD (gastroesophageal reflux disease)     Hiatal hernia 03/24/2023    History of migraine headaches     Hx of colonic polyp     Hypertension     Idiopathic neuropathy 07/20/2012    ILD (interstitial lung disease) 11/06/2013    Iron deficiency anemia 03/18/2014    MRSA carrier     Osteopenia     Pneumonia     Pulmonary fibrosis     Pulmonary hypertension     Raynaud's disease     Scleroderma, diffuse     Sjogren's syndrome     Vitamin D deficiency 11/14/2013     Past Surgical History:   Procedure Laterality Date    24 HOUR IMPEDANCE PH MONITORING OF ESOPHAGUS IN PATIENT NOT TAKING ACID REDUCING MEDICATIONS N/A 03/04/2020    Procedure: IMPEDANCE PH STUDY, ESOPHAGEAL, 24 HOUR, IN PATIENT NOT TAKING ACID REDUCING MEDICATION;  Surgeon: Annamaria Mendoza MD;  Location: [facility] ([address] [place] [address]);  Service: Endoscopy;  Laterality: N/A;  OFF PPI/H2 Blocker   Motility Studies   Hold Narcotics x 1 days   Hold TCA x 1 days  2/26 - LVM attempting to confirm appt  2/27 - Confirmed  appt    ANORECTAL MANOMETRY N/A 05/10/2021    Procedure: MANOMETRY, ANORECTAL with balloon expulsion test;  Surgeon: Annamaria Mendoza MD;  Location: [facility] (4TH FLR);  Service: Endoscopy;  Laterality: N/A;  order combined  covid test 5/7 [facility], instructions emailed-[facility]    BREAST BIOPSY      Left, benign    CERVICAL CONIZATION   W/ LASER  1970    COLONOSCOPY      COLONOSCOPY N/A 03/29/2019    Procedure: COLONOSCOPY;  Surgeon: Annamaria Mendoza MD;  Location: [facility] (2ND FLR);  Service: Endoscopy;  Laterality: N/A;    COLONOSCOPY N/A 05/10/2021    Procedure: COLONOSCOPY;  Surgeon: Annamaria Mendoza MD;  Location: [facility] (4TH FLR);  Service: Endoscopy;  Laterality: N/A;  2nd floor-previous scopes done on 2nd floor, gastroparesis  full liquid diet x2 days, clear liquid x1 day prior to procedure  covid test 5/7 [facility]ian[facility], instructions emailed-[facility]  5/6 pt confirmed appt-[facility]    DILATION AND CURETTAGE OF UTERUS      ESOPHAGEAL MANOMETRY WITH MEASUREMENT OF IMPEDANCE N/A 03/04/2020    Procedure: MANOMETRY, ESOPHAGUS, WITH IMPEDANCE MEASUREMENT;  Surgeon: Annamaria Mendoza MD;  Location: [facility] (4TH FLR);  Service: Endoscopy;  Laterality: N/A;  OFF PPI/H2 Blocker   Motility Studies   Hold Narcotics x 1 days   Hold TCA x 1 days    ESOPHAGOGASTRODUODENOSCOPY      ESOPHAGOGASTRODUODENOSCOPY N/A 03/29/2019    Procedure: EGD (ESOPHAGOGASTRODUODENOSCOPY);  Surgeon: Annamaria Mendoza MD;  Location: [facility] (2ND FLR);  Service: Endoscopy;  Laterality: N/A;  pulmonary htn    ESOPHAGOGASTRODUODENOSCOPY N/A 02/02/2021    Procedure: ESOPHAGOGASTRODUODENOSCOPY (EGD);  Surgeon: Annamaria Mendoza MD;  Location: [facility] (2ND FLR);  Service: Endoscopy;  Laterality: N/A;  2nd floor gastroparesis  3 days full liquid diet and 1 day clears  covid test 1/30 primary care, instructions sent to [facility]    ESOPHAGOGASTRODUODENOSCOPY N/A 07/01/2022    Procedure: ESOPHAGOGASTRODUODENOSCOPY (EGD);  Surgeon: Annamaria Mendoza MD;   Location: [facility] ENDO (2ND FLR);  Service: Endoscopy;  Laterality: N/A;  2nd floor-gastroparesis  full liquid diet x3 days, clear liquid diet x1 day prior to procedure  fully vaccinated, instructions sent to [facility]  6/29-pt confirmed new arrival time-[facility]    EXCISION, LESION, STOMACH, LAPAROSCOPIC N/A 03/23/2023    Procedure: XI ROBOTIC EXCISION, LESION, STOMACH;  Surgeon: Katherine Segura MD;  [facility];  Service: General;  Laterality: N/A;    EXCISIONAL BIOPSY, LYMPH NODE Right 05/26/2023    Procedure: EXCISIONAL BIOPSY, LYMPH NODE, INGUINAL;  Surgeon: Katherine Segura MD;  [facility];  Service: General;  Laterality: Right;    HYSTERECTOMY  1990    FRANDY (AUB, Fibroids), ovaries remain    REPAIR, HERNIA, UMBILICAL N/A 03/23/2023    Procedure: REPAIR, HERNIA, UMBILICAL;  Surgeon: Katherine Segura MD;  [address]  Service: General;  Laterality: N/A;    RIGHT HEART CATHETERIZATION Right 01/05/2021    Procedure: INSERTION, CATHETER, RIGHT HEART;  Surgeon: Aureliano Sy MD;  Location: [facility] CATH LAB;  Service: Cardiology;  Laterality: Right;    RIGHT HEART CATHETERIZATION Right 03/16/2023    Procedure: INSERTION, CATHETER, RIGHT HEART;  Surgeon: Aureliano Sy MD;  Location: [facility] CATH LAB;  Service: Cardiology;  Laterality: Right;    ROBOT-ASSISTED LAPAROSCOPIC REPAIR OF INGUINAL HERNIA USING DA VERNELL XI Right 05/26/2023    Procedure: XI ROBOTIC REPAIR, HERNIA, INGUINAL, FEMORAL;  Surgeon: Katherine Segura MD;  [facility];  Service: General;  Laterality: Right;    ROBOT-ASSISTED REPAIR OF HIATAL HERNIA USING DA VERNELL XI N/A 03/23/2023    Procedure: XI ROBOTIC REPAIR, HERNIA, HIATAL;  Surgeon: Katherine Segura MD;  [facility];  Service: General;  Laterality: N/A;    VARICOSE VEIN SURGERY      XI ROBOTIC GASTROPEXY N/A 03/23/2023    Procedure: XI ROBOTIC GASTROPEXY;  Surgeon:  Katherine Segura MD;  [facility];  Service: General;  Laterality: N/A;    XI ROBOTIC PYLOROMYOTOMY N/A 03/23/2023    Procedure: XI ROBOTIC PYLOROMYOTOMY;  Surgeon: Katherine Segura MD;  Location: [facility];  Service: General;  Laterality: N/A;     Social History     Socioeconomic History    Marital status:      Spouse name: Lewis    Number of children: 4   Occupational History    Occupation: -retired     Employer: [employer]     Comment: [employer] [place] [employer]     Employer: RETIRED   Tobacco Use    Smoking status: Never     Passive exposure: Never    Smokeless tobacco: Never   Substance and Sexual Activity    Alcohol use: Not Currently     Comment: wine occasionally    Drug use: No    Sexual activity: Yes     Partners: Male     Birth control/protection: None   Other Topics Concern    Are you pregnant or think you may be? No    Breast-feeding No   Social History Narrative         Social Determinants of Health     Financial Resource Strain: Low Risk     Difficulty of Paying Living Expenses: Not hard at all   Food Insecurity: No Food Insecurity    Worried About Running Out of Food in the Last Year: Never true    Ran Out of Food in the Last Year: Never true   Transportation Needs: No Transportation Needs    Lack of Transportation (Medical): No    Lack of Transportation (Non-Medical): No   Physical Activity: Insufficiently Active    Days of Exercise per Week: 1 day    Minutes of Exercise per Session: 30 min   Stress: No Stress Concern Present    Feeling of Stress : Not at all   Social Connections: Moderately Isolated    Frequency of Communication with Friends and Family: Three times a week    Frequency of Social Gatherings with Friends and Family: Once a week    Attends Sikhism Services: Never    Active Member of Clubs or Organizations: No    Attends Club or Organization Meetings: Never    Marital Status:    Housing Stability: Low Risk     Unable to  Pay for Housing in the Last Year: No    Number of Places Lived in the Last Year: 1    Unstable Housing in the Last Year: No     Family History   Problem Relation Age of Onset    Breast cancer Mother     Hypertension Father     Breast cancer Sister     Diabetes Sister     Osteoarthritis Brother     Diabetes Brother     No Known Problems Daughter     No Known Problems Daughter     No Known Problems Son     No Known Problems Son     Breast cancer Maternal Aunt     Melanoma Neg Hx     Colon cancer Neg Hx     Crohn's disease Neg Hx     Stomach cancer Neg Hx     Ulcerative colitis Neg Hx     Rectal cancer Neg Hx     Irritable bowel syndrome Neg Hx     Esophageal cancer Neg Hx     Celiac disease Neg Hx     Ovarian cancer Neg Hx     Liver cancer Neg Hx     Pancreatic cancer Neg Hx        Review of patient's allergies indicates:   Allergen Reactions    Sulfa (sulfonamide antibiotics)      Other reaction(s): Rash       Current Outpatient Medications   Medication Sig    acetaminophen-codeine 300-30mg (TYLENOL #3) 300-30 mg Tab Take 1 tablet by mouth every 6 (six) hours as needed.    albuterol (VENTOLIN HFA) 90 mcg/actuation inhaler Inhale 2 puffs into the lungs every 4 (four) hours as needed for Wheezing. Rescue    carboxymethylcellulose sodium (REFRESH TEARS) 0.5 % Drop Apply 1-2 drops to every as needed    ergocalciferol (ERGOCALCIFEROL) 50,000 unit Cap Take 1 capsule (50,000 Units total) by mouth every 7 days.    ferrous sulfate 325 (65 FE) MG EC tablet Take 325 mg by mouth every morning.    flu vac 2022 65up-[id number],PF, (FLUAD QUAD 2022-23,65Y UP,,PF,) 60 mcg (15 mcg x 4)/0.5 mL Syrg Inject into the muscle.    HYDROcodone-acetaminophen (NORCO) 5-325 mg per tablet Take 1 tablet by mouth every 6 (six) hours as needed for Pain.    multivitamin capsule Take 1 capsule by mouth once daily.    mycophenolate mofetil (CELLCEPT) 200 mg/mL SusR Take 5 mLs (1,000 mg total) by mouth 2 (two) times daily.    NIFEdipine (ADALAT CC) 90 MG  TbSR TAKE 1 TABLET(90 MG) BY MOUTH EVERY DAY (Patient taking differently: Take by mouth nightly. TAKE 1 TABLET(90 MG) BY MOUTH EVERY DAY (TAKES NIGHTLY WITH 30 MG TABLET TOTAL 120 MG))    NIFEdipine (PROCARDIA-XL) 30 MG (OSM) 24 hr tablet TAKE 1 TABLET(30 MG) BY MOUTH EVERY DAY (Patient taking differently: Take by mouth nightly. TAKE WITH 90 MG TABLET EVERY NIGHT (TOTAL 120 MG))    pravastatin (PRAVACHOL) 20 MG tablet Take 1 tablet (20 mg total) by mouth every evening.    pregabalin (LYRICA) 300 MG Cap Take 1 capsule (300 mg total) by mouth 2 (two) times daily.    PREVIDENT 5000 BOOSTER PLUS 1.1 % Pste SMARTSIG:To Teeth    PREVIDENT 5000 SENSITIVE 1.1-5 % Pste BRUSH FOR 2 MINUTES TWICE PER DAY    RABEprazole (ACIPHEX) 20 mg tablet Take 1 tablet (20 mg total) by mouth 2 (two) times daily.    sars-cov-2, covid-19, (MODERNA COVID-19) 50 mcg/0.25 ml injection (BOOSTER) Inject into the muscle.    tadalafil (ADCIRCA) 20 mg Tab Take 2 tablets (40 mg total) by mouth once daily. (Patient taking differently: Take 40 mg by mouth every evening.)    tiZANidine (ZANAFLEX) 4 MG tablet Take 1 tablet (4 mg total) by mouth nightly as needed (muscle pain).     No current facility-administered medications for this visit.       REVIEW OF SYSTEMS:    GENERAL:  No weight loss, malaise or fevers.  HEENT:   No recent changes in vision or hearing  NECK:  Negative for lumps,  difficulty with swallowing associated with scleroderma.  RESPIRATORY:  Negative for cough, wheezing or shortness of breath, patient denies any recent URI. Albuterol (history of ILD)  CARDIOVASCULAR:  Negative for chest pain, leg swelling or palpitations.  GI:  Negative for abdominal discomfort, blood in stools or black stools or change in bowel habits. GERD controlled zantac  MUSCULOSKELETAL:  See HPI.  SKIN:   Chronic low healing venous stasis ulcerations to bilateral lower extremities   PSYCH:  No mood disorder or recent psychosocial stressors.  Patients sleep is not  disturbed secondary to pain.  HEMATOLOGY/LYMPHOLOGY:   History of iron deficiency anemia, takes daily iron supplementation.    ENDO: No history of diabetes or thyroid dysfunction  NEURO:   No history of headaches, syncope, paralysis, seizures or tremors.  All other reviewed and negative other than HPI.    OBJECTIVE:      Exam limited due to virtual visit:  GENERAL: Well appearing, in no acute distress, alert and oriented x3.  PSYCH:  Mood and affect appropriate.    Previous physical exam:  There were no vitals taken for this visit.    PHYSICAL EXAMINATION:    GENERAL: Well appearing, in no acute distress, alert and oriented x3.  PSYCH:  Mood and affect appropriate.  SKIN: Tight skin associated with scleroderma. Wearing shoes today.   HEAD/FACE:  Normocephalic, atraumatic. Cranial nerves grossly intact.  CV: RRR with palpation of the radial artery.  PULM: No evidence of respiratory difficulty, symmetric chest rise.  EXTREMITIES: Contractures over on bilateral hands with ulcerations to knuckles, right greater than left.   MUSCULOSKELETAL:   Bilateral lower extremity strength testing limited secondary to pain in the feet.    NEURO:  Decreased sensation to BLE.   GAIT: Antalgic, ambulates without assistance       ASSESSMENT: 72 y.o. year old female with pain, consistent with the following:    Encounter Diagnoses   Name Primary?    Chronic pain disorder Yes    Cutaneous scleroderma     Idiopathic neuropathy     Myofascial pain     Cervical spondylosis     DDD (degenerative disc disease), cervical          PLAN:     - Previous imaging reviewed today. Labs reviewed.      - Continue Lyrica 300 mg BID.     - Continue Relafen.     - Continue Zanaflex, refill provided.     - Pain Contract signed 8/26/2022.     - Continue Tylenol #3 once daily PRN. Refill provided.    - The patient is here today for a refill of current pain medications and they believe these provide effective pain control and improvements in quality of life.   The patient notes no serious side effects, and feels the benefits outweigh the risks.  The patient was reminded of the pain contract that they signed previously as well as the risks and benefits of the medication including possible death.  The updated [place] Board of Pharmacy prescription monitoring program was reviewed, and the patient has been found to be compliant with current treatment plan. Medication management provided by Dr. Hinson.     - Previous UDS reviewed.  Repeat next visit.    - Continue home exercise routine.     - RTC in 3 months or sooner if needed.      The above plan and management options were discussed at length with patient. Patient is in agreement with the above and verbalized understanding.     Viktoriya Camara NP  07/28/2023

## 2023-07-31 ENCOUNTER — PATIENT MESSAGE (OUTPATIENT)
Dept: TRANSPLANT | Facility: CLINIC | Age: 73
End: 2023-07-31
Payer: MEDICARE

## 2023-07-31 ENCOUNTER — OFFICE VISIT (OUTPATIENT)
Dept: PRIMARY CARE CLINIC | Facility: CLINIC | Age: 73
End: 2023-07-31
Payer: MEDICARE

## 2023-07-31 ENCOUNTER — TELEPHONE (OUTPATIENT)
Dept: INTERNAL MEDICINE | Facility: CLINIC | Age: 73
End: 2023-07-31
Payer: MEDICARE

## 2023-07-31 ENCOUNTER — HOSPITAL ENCOUNTER (OUTPATIENT)
Dept: RADIOLOGY | Facility: HOSPITAL | Age: 73
Discharge: HOME | End: 2023-07-31
Attending: INTERNAL MEDICINE
Payer: MEDICARE

## 2023-07-31 VITALS
DIASTOLIC BLOOD PRESSURE: 68 MMHG | WEIGHT: 151.44 LBS | OXYGEN SATURATION: 93 % | HEIGHT: 65 IN | SYSTOLIC BLOOD PRESSURE: 128 MMHG | BODY MASS INDEX: 25.23 KG/M2 | TEMPERATURE: 99 F | HEART RATE: 91 BPM

## 2023-07-31 DIAGNOSIS — R05.9 COUGH, UNSPECIFIED TYPE: Primary | ICD-10-CM

## 2023-07-31 DIAGNOSIS — R05.9 COUGH, UNSPECIFIED TYPE: ICD-10-CM

## 2023-07-31 PROCEDURE — 71046 X-RAY EXAM CHEST 2 VIEWS: CPT | Mod: 26,NTX,, | Performed by: RADIOLOGY

## 2023-07-31 PROCEDURE — 71046 XR CHEST PA AND LATERAL: ICD-10-PCS | Mod: 26,NTX,, | Performed by: RADIOLOGY

## 2023-07-31 PROCEDURE — 71046 X-RAY EXAM CHEST 2 VIEWS: CPT | Mod: TC,TXP

## 2023-07-31 PROCEDURE — 99214 OFFICE O/P EST MOD 30 MIN: CPT | Mod: S$PBB,NTX,, | Performed by: INTERNAL MEDICINE

## 2023-07-31 PROCEDURE — 99999 PR PBB SHADOW E&M-EST. PATIENT-LVL IV: ICD-10-PCS | Mod: PBBFAC,TXP,, | Performed by: INTERNAL MEDICINE

## 2023-07-31 PROCEDURE — 99214 PR OFFICE/OUTPT VISIT, EST, LEVL IV, 30-39 MIN: ICD-10-PCS | Mod: S$PBB,NTX,, | Performed by: INTERNAL MEDICINE

## 2023-07-31 PROCEDURE — 99999 PR PBB SHADOW E&M-EST. PATIENT-LVL IV: CPT | Mod: PBBFAC,TXP,, | Performed by: INTERNAL MEDICINE

## 2023-07-31 PROCEDURE — 99214 OFFICE O/P EST MOD 30 MIN: CPT | Mod: PBBFAC,25,TXP | Performed by: INTERNAL MEDICINE

## 2023-07-31 RX ORDER — DOXYCYCLINE HYCLATE 100 MG
100 TABLET ORAL 2 TIMES DAILY
Qty: 20 TABLET | Refills: 0 | Status: SHIPPED | OUTPATIENT
Start: 2023-07-31 | End: 2023-09-18

## 2023-07-31 NOTE — TELEPHONE ENCOUNTER
Started yesterday with Cough and fever.    I returned call and spoke to .  Has taken tylenol .  Pain in lung.    They booked jordan hartman for 1:20 today at [facility].    I told her in meantime to use home test for covid.  Keep appt for eval and treatment .    Urgent care will be needed if dr sofia doesn't see them.  In meantime, said ok to continue tylenol for fever and push fluids, rest.  Mask around others.

## 2023-07-31 NOTE — TELEPHONE ENCOUNTER
----- Message from Crystal Saab sent at 7/31/2023  9:38 AM CDT -----  Contact: Lewis [phone]  [email or web address]     Patient is calling for Medical Advice regarding: cough and running fever     How long has patient had these symptoms:last night     Pharmacy name and phone#:  [id number] - [place], [place] - [address]   [address]  [facility] [address]  Phone: [phone] Fax: [phone]       Would like response via "[employer]:  no    Comments:pts  is calling he states due to the pts immune system he has been told by the dr to call and there is nothing available until tomorrow

## 2023-07-31 NOTE — PROGRESS NOTES
[facility] Note    Chief Complaint      Chief Complaint   Patient presents with    Fatigue     Started last night, at home covid test negative     Cough    Fever     History of Present Illness      Yamel Shah is a 72 y.o. female who presents today for chief complaint cough, fever. Patient is new to me.    PCP: Aly Rand MD  Patient comes to appointment with her  .     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   She has fever at home but did take tylenol   She is scheduled fro covid vaccine tomorrow  They are flying to [facility] Wednesday   They discussed with pulmonology as well   Last augmentin in 12.22  No other recent abx but says she usuallytakes levaquin for lung issues   Active Problem List with Overview Notes    Diagnosis Date Noted    Right inguinal hernia 05/26/2023    Femoral hernia of right side 05/26/2023    Chronic pulmonary heart disease 02/28/2023    Immunosuppression due to drug therapy 08/30/2022    Calcification of aorta 08/30/2022    PVD (peripheral vascular disease)     Pelvic floor dysfunction 11/10/2021    Cervical spondylosis 10/20/2021    Pulmonary hypertension 01/05/2021    Abnormal finding of blood chemistry, unspecified  12/10/2020    Painful cervical range of motion 10/27/2020    Weakness of both upper extremities 10/27/2020    Posture abnormality 10/27/2020    Stiffness in joint 07/14/2020    Arterial insufficiency with ischemic ulcer 05/19/2019    Chronic pain disorder 05/19/2019    ROXANNE (iron deficiency anemia) 03/29/2019    Essential hypertension 03/12/2019    Raynaud's disease without gangrene 01/16/2019    contracture 01/16/2019     rick hand-finger contractures-      Scleroderma 06/25/2015    Skin ulcers of both feet 04/10/2015    Osteopenia 04/08/2015    Vitamin D deficiency 11/14/2013    ILD (interstitial lung disease) 11/06/2013     PFTs       FVC     SVC      RV      DLco    TLC    5/22/23   71.1                   68.5   52.3      66.8  10/7/22   67.6                    63.1  68.9      62.7  1/3/22     67.5   5/25/21   67.8                    59.3   64.1      62.4  1/10/20    74       72  3/12/19    60         64         61      82  3/6/18      64         70         64      112  4/8/16      70         70         89       84  9/4/15      66         62         72       71  1/19/15    66           2/14/14    64                                 87      Parameter FVC FEV1 FEV1/FVC TLC TLC_N2 DLCO  Date (L) (L) (%) (L) (L) (ml/(m...  2/14/2014 2.04 1.63 80 14.83  4/8/2016 2.07 1.65 80 3.74 15.36  3/12/2019 1.82 1.43 78 3.13 15.01  9/9/2019 1.98 1.60 81 12.64  1/10/2020 1.91 1.57 82 3.18 15.88  9/11/2020 1.99 1.57 79 3.23 13.84  5/25/2021 1.85 1.48 80 3.18 14.11  1/3/2022   1.83 1.44 79  FVC L  FEV1 L  1.00  1.30  1.60  1.90  2.20  2.50  2.80  Measured values  1/1/ 2015 1/1/ 2016 1/1/ 2017 1/1/ 2018 1/1/ 2019 1/1/ 2020 1/1/ 2021  TLC L  TLC_N2 L  3.00  3.20  3.40  3.60  3.80  4.00  Measured values  4/8  2016  3/12  2019  1/10  2020  9/11  2020  5/25  2021  Trend Table  Trend Graph - FVC & FEV1  Trend Graph - TLC  DLCO Adj ml/(min*mmHg)  12.00  12.50  13.00  13.50  14.00  14.50  15.00  15.50  16.00  16.50            Fecal incontinence 11/06/2013    Urinary incontinence 11/06/2013    Telangiectasia 05/01/2013    Venous insufficiency of both lower extremities 03/07/2013    Pulmonary hypertension associated with systemic disorder 01/08/2013 6/2015 [facility] - no resting or exercise induced pulmonary hypertension. Not a bosentan candidate.       Scleroderma with pulmonary involvement 07/20/2012    Idiopathic neuropathy 07/20/2012       Health Maintenance   Topic Date Due    Mammogram  07/05/2024    DEXA Scan  10/07/2024    Lipid Panel  10/07/2027    TETANUS VACCINE  02/06/2029    Hepatitis C Screening  Completed       Past Medical History:   Diagnosis Date    Abnormal Pap smear      Acid reflux     Allergy     Arthritis     Encounter for blood transfusion     GERD (gastroesophageal reflux disease)     Hiatal hernia 03/24/2023    History of migraine headaches     Hx of colonic polyp     Hypertension     Idiopathic neuropathy 07/20/2012    ILD (interstitial lung disease) 11/06/2013    Iron deficiency anemia 03/18/2014    MRSA carrier     Osteopenia     Pneumonia     Pulmonary fibrosis     Pulmonary hypertension     Raynaud's disease     Scleroderma, diffuse     Sjogren's syndrome     Vitamin D deficiency 11/14/2013       Past Surgical History:   Procedure Laterality Date    24 HOUR IMPEDANCE PH MONITORING OF ESOPHAGUS IN PATIENT NOT TAKING ACID REDUCING MEDICATIONS N/A 03/04/2020    Procedure: IMPEDANCE PH STUDY, ESOPHAGEAL, 24 HOUR, IN PATIENT NOT TAKING ACID REDUCING MEDICATION;  Surgeon: Annamaria Mendoza MD;  Location: [facility] (4TH FLR);  Service: Endoscopy;  Laterality: N/A;  OFF PPI/H2 Blocker   Motility Studies   Hold Narcotics x 1 days   Hold TCA x 1 days  2/26 - LVM attempting to confirm appt  2/27 - Confirmed appt    ANORECTAL MANOMETRY N/A 05/10/2021    Procedure: MANOMETRY, ANORECTAL with balloon expulsion test;  Surgeon: Annamaria Mendoza MD;  Location: [facility] (4TH FLR);  Service: Endoscopy;  Laterality: N/A;  order combined  covid test 5/7 Uatsdin, instructions emailed-KPvt    BREAST BIOPSY      Left, benign    CERVICAL CONIZATION   W/ LASER  1970    COLONOSCOPY      COLONOSCOPY N/A 03/29/2019    Procedure: COLONOSCOPY;  Surgeon: Annamaria Mendoza MD;  Location: [facility] (2ND FLR);  Service: Endoscopy;  Laterality: N/A;    COLONOSCOPY N/A 05/10/2021    Procedure: COLONOSCOPY;  Surgeon: Annamaria Mendoza MD;  Location: [facility] (4TH FLR);  Service: Endoscopy;  Laterality: N/A;  2nd floor-previous scopes done on 2nd floor, gastroparesis  full liquid diet x2 days, clear liquid x1 day prior to procedure  covid test 5/7 Uatsdin, instructions emailed-KPvt  5/6 pt confirmed appt-KPvt     DILATION AND CURETTAGE OF UTERUS      ESOPHAGEAL MANOMETRY WITH MEASUREMENT OF IMPEDANCE N/A 03/04/2020    Procedure: MANOMETRY, ESOPHAGUS, WITH IMPEDANCE MEASUREMENT;  Surgeon: Annamaria Mendoza MD;  Location: [facility] ENDO (4TH FLR);  Service: Endoscopy;  Laterality: N/A;  OFF PPI/H2 Blocker   Motility Studies   Hold Narcotics x 1 days   Hold TCA x 1 days    ESOPHAGOGASTRODUODENOSCOPY      ESOPHAGOGASTRODUODENOSCOPY N/A 03/29/2019    Procedure: EGD (ESOPHAGOGASTRODUODENOSCOPY);  Surgeon: Annamaria Mendoza MD;  Location: [facility] ENDO (2ND FLR);  Service: Endoscopy;  Laterality: N/A;  pulmonary htn    ESOPHAGOGASTRODUODENOSCOPY N/A 02/02/2021    Procedure: ESOPHAGOGASTRODUODENOSCOPY (EGD);  Surgeon: Annamaria Mendoza MD;  Location: [facility] ENDO (2ND FLR);  Service: Endoscopy;  Laterality: N/A;  2nd floor gastroparesis  3 days full liquid diet and 1 day clears  covid test 1/30 primary care, instructions sent to [facility]    ESOPHAGOGASTRODUODENOSCOPY N/A 07/01/2022    Procedure: ESOPHAGOGASTRODUODENOSCOPY (EGD);  Surgeon: Annamaria Mendoza MD;  Location: [facility] ENDO (2ND FLR);  Service: Endoscopy;  Laterality: N/A;  2nd floor-gastroparesis  full liquid diet x3 days, clear liquid diet x1 day prior to procedure  fully vaccinated, instructions sent to [facility]  6/29-pt confirmed new arrival time-[facility]ospit[facility] in [place]    EXCISION, LESION, STOMACH, LAPAROSCOPIC N/A 03/23/2023    Procedure: XI ROBOTIC EXCISION, LESION, STOMACH;  Surgeon: Katherine Segura MD;  Location: [facility] OR 2ND FLR;  Service: General;  Laterality: N/A;    EXCISIONAL BIOPSY, LYMPH NODE Right 05/26/2023    Procedure: EXCISIONAL BIOPSY, LYMPH NODE, INGUINAL;  Surgeon: Katherine Segura MD;  [facility];  Service: General;  Laterality: Right;    HYSTERECTOMY  1990    FRANDY (AUB, Fibroids), ovaries remain    REPAIR, HERNIA, UMBILICAL N/A 03/23/2023    Procedure: REPAIR, HERNIA, UMBILICAL;  Surgeon: Katherine Segura MD;  Location: [facility] OR  2ND FLR;  Service: General;  Laterality: N/A;    RIGHT HEART CATHETERIZATION Right 01/05/2021    Procedure: INSERTION, CATHETER, RIGHT HEART;  Surgeon: Aureliano Sy MD;  Location: [facility] CATH LAB;  Service: Cardiology;  Laterality: Right;    RIGHT HEART CATHETERIZATION Right 03/16/2023    Procedure: INSERTION, CATHETER, RIGHT HEART;  Surgeon: Aureliano Sy MD;  Location: [facility] CATH LAB;  Service: Cardiology;  Laterality: Right;    ROBOT-ASSISTED LAPAROSCOPIC REPAIR OF INGUINAL HERNIA USING DA VERNELL XI Right 05/26/2023    Procedure: XI ROBOTIC REPAIR, HERNIA, INGUINAL, FEMORAL;  Surgeon: Katherine Segura MD;  Location: [facility] OR 2ND FLR;  Service: General;  Laterality: Right;    ROBOT-ASSISTED REPAIR OF HIATAL HERNIA USING DA VERNELL XI N/A 03/23/2023    Procedure: XI ROBOTIC REPAIR, HERNIA, HIATAL;  Surgeon: Katherine Segura MD;  Location: [facility] OR 2ND FLR;  Service: General;  Laterality: N/A;    VARICOSE VEIN SURGERY      XI ROBOTIC GASTROPEXY N/A 03/23/2023    Procedure: XI ROBOTIC GASTROPEXY;  Surgeon: Katherine Segura MD;  Location: [facility] OR 2ND FLR;  Service: General;  Laterality: N/A;    XI ROBOTIC PYLOROMYOTOMY N/A 03/23/2023    Procedure: XI ROBOTIC PYLOROMYOTOMY;  Surgeon: Katherine Segura MD;  Location: [facility] OR 2ND FLR;  Service: General;  Laterality: N/A;       family history includes Arthritis in her brother and sister; Birth defects in her brother; Breast cancer in her maternal aunt, mother, and sister; Cancer in her maternal aunt, mother, and sister; Diabetes in her brother, father, and sister; Early death in her sister; Hypertension in her father; No Known Problems in her daughter, daughter, son, and son; Osteoarthritis in her brother.    Social History     Tobacco Use    Smoking status: Never     Passive exposure: Never    Smokeless tobacco: Never   Substance Use Topics    Alcohol use: Never     Comment: wine occasionally    Drug use: No       Review of  Systems   Constitutional:  Positive for fever and malaise/fatigue.   HENT:  Negative for congestion, sinus pain and sore throat.    Respiratory:  Positive for cough and wheezing. Negative for sputum production and shortness of breath.         Outpatient Encounter Medications as of 7/31/2023   Medication Sig Note Dispense Refill    acetaminophen-codeine 300-30mg (TYLENOL #3) 300-30 mg Tab Take 1 tablet by mouth daily as needed (pain).  30 tablet 0    albuterol (VENTOLIN HFA) 90 mcg/actuation inhaler Inhale 2 puffs into the lungs every 4 (four) hours as needed for Wheezing. Rescue 5/25/2023: May take morning of surgery if needed and bring to the hospital    18 g 3    carboxymethylcellulose sodium (REFRESH TEARS) 0.5 % Drop Apply 1-2 drops to every as needed 5/25/2023: May take morning of surgery if needed         ergocalciferol (ERGOCALCIFEROL) 50,000 unit Cap Take 1 capsule (50,000 Units total) by mouth every 7 days. 5/25/2023: Hold am of surgery 12 capsule 1    ferrous sulfate 325 (65 FE) MG EC tablet Take 325 mg by mouth every morning. 5/25/2023: Hold am of surgery      flu vac 2022 65up-[id number],PF, (FLUAD QUAD 2022-23,65Y UP,,PF,) 60 mcg (15 mcg x 4)/0.5 mL Syrg Inject into the muscle.  0.5 mL 0    multivitamin capsule Take 1 capsule by mouth once daily. 5/25/2023: Hold am of surgery      mycophenolate mofetil (CELLCEPT) 200 mg/mL SusR Take 5 mLs (1,000 mg total) by mouth 2 (two) times daily. 5/25/2023: Patient has been holding for 1 week 480 mL 1    NIFEdipine (ADALAT CC) 90 MG TbSR TAKE 1 TABLET(90 MG) BY MOUTH EVERY DAY (Patient taking differently: Take by mouth nightly. TAKE 1 TABLET(90 MG) BY MOUTH EVERY DAY (TAKES NIGHTLY WITH 30 MG TABLET TOTAL 120 MG)) 5/25/2023: May take pm before surgery 90 tablet 3    NIFEdipine (PROCARDIA-XL) 30 MG (OSM) 24 hr tablet TAKE 1 TABLET(30 MG) BY MOUTH EVERY DAY (Patient taking differently: Take by mouth nightly. TAKE WITH 90 MG TABLET EVERY NIGHT (TOTAL 120 MG)) 5/25/2023:  "May take pm before surgery 30 tablet 11    pravastatin (PRAVACHOL) 20 MG tablet Take 1 tablet (20 mg total) by mouth every evening.  90 tablet 2    pregabalin (LYRICA) 300 MG Cap Take 1 capsule (300 mg total) by mouth 2 (two) times daily.  60 capsule 6    PREVIDENT 5000 BOOSTER PLUS 1.1 % Pste SMARTSIG:To Teeth       PREVIDENT 5000 SENSITIVE 1.1-5 % Pste BRUSH FOR 2 MINUTES TWICE PER DAY       RABEprazole (ACIPHEX) 20 mg tablet Take 1 tablet (20 mg total) by mouth 2 (two) times daily. 5/25/2023: May take am of surgery 60 tablet 11    sars-cov-2, covid-19, (MODERNA COVID-19) 50 mcg/0.25 ml injection (BOOSTER) Inject into the muscle.  0.25 mL 0    tadalafil (ADCIRCA) 20 mg Tab Take 2 tablets (40 mg total) by mouth once daily. (Patient taking differently: Take 40 mg by mouth every evening.) 5/25/2023: May take pm before surgery 60 tablet 11    tiZANidine (ZANAFLEX) 4 MG tablet Take 1 tablet (4 mg total) by mouth nightly as needed (muscle pain).  30 tablet 3    doxycycline (VIBRA-TABS) 100 MG tablet Take 1 tablet (100 mg total) by mouth 2 (two) times daily.  20 tablet 0     No facility-administered encounter medications on file as of 7/31/2023.        Review of patient's allergies indicates:   Allergen Reactions    Sulfa (sulfonamide antibiotics)      Other reaction(s): Rash    Tramadol Itching           Physical Exam      Vital Signs  Temp: 99.3 °F (37.4 °C)  Pulse: 91  SpO2: (!) 93 %  BP: 128/68  BP Location: Left arm  Patient Position: Sitting  Height and Weight  Height: 5' 5" (165.1 cm)  Weight: 68.7 kg (151 lb 7.3 oz)  BSA (Calculated - sq m): 1.77 sq meters  BMI (Calculated): 25.2  Weight in (lb) to have BMI = 25: 149.9]    Physical Exam  Vitals reviewed.   Constitutional:       General: She is not in acute distress.     Appearance: She is well-developed. She is not diaphoretic.   HENT:      Head: Normocephalic and atraumatic.      Right Ear: External ear normal.      Left Ear: External ear normal.      Nose: Nose " "normal.   Eyes:      General:         Right eye: No discharge.         Left eye: No discharge.      Conjunctiva/sclera: Conjunctivae normal.   Cardiovascular:      Rate and Rhythm: Normal rate and regular rhythm.   Pulmonary:      Effort: Pulmonary effort is normal. No respiratory distress.      Breath sounds: Rales present.   Musculoskeletal:         General: Normal range of motion.      Cervical back: Normal range of motion.   Skin:     Coloration: Skin is not pale.      Findings: No rash.   Neurological:      Mental Status: She is alert and oriented to person, place, and time.   Psychiatric:         Behavior: Behavior normal.         Thought Content: Thought content normal.          Laboratory:  CBC:  Recent Labs   Lab Result Units 06/19/23  1125   WBC K/uL 5.19   RBC M/uL 3.82*   Hemoglobin g/dL 12.7   Hematocrit % 36.8*   Platelets K/uL 190   MCV fL 96   MCH pg 33.2*   MCHC g/dL 34.5     CMP:  Recent Labs   Lab Result Units 06/19/23  1125   Glucose mg/dL 109   Calcium mg/dL 9.2   Albumin g/dL 3.6   Total Protein g/dL 9.0*   Sodium mmol/L 144   Potassium mmol/L 3.9   CO2 mmol/L 22*   Chloride mmol/L 110   BUN mg/dL 21   Alkaline Phosphatase U/L 119   ALT U/L 8*   AST U/L 18   Total Bilirubin mg/dL 0.3     URINALYSIS:  Recent Labs   Lab Result Units 06/19/23  1141   Color, UA  Yellow   Specific Gravity, UA  1.020   pH, UA  6.0   Protein, UA  Trace*   Nitrite, UA  Negative   Leukocytes, UA  Negative      LIPIDS:  No results for input(s): "TSH", "HDL", "CHOL", "TRIG", "LDLCALC", "CHOLHDL", "NONHDLCHOL", "TOTALCHOLEST" in the last 2160 hours.  TSH:  No results for input(s): "TSH" in the last 2160 hours.  A1C:  No results for input(s): "HGBA1C" in the last 2160 hours.    Radiology:      Assessment/Plan     Yamel Shah is a 72 y.o.female with:    1. Cough, unspecified type  -     X-Ray Chest PA And Lateral; Future; Expected date: 07/31/2023  -     doxycycline (VIBRA-TABS) 100 MG tablet; Take 1 tablet " (100 mg total) by mouth 2 (two) times daily.  Dispense: 20 tablet; Refill: 0         Use of the [facility] Patient Portal discussed and encouraged during today's visit  -Continue current medications and maintain follow up with specialists.    Future Appointments   Date Time Provider Department Center   8[facility]17/2023  9[facility]   8/17/2023 12:15 PM PULMONARY FUNCTION [facility] PULMLAB [facility]   8/17/2023 12:30 PM PULMONARY FUNCTION [facility] PULMLAB [facility]   8/17/2023 12:45 PM PULMONARY FUNCTION [facility] PULMLAB [facility]   8/17/2023  1:30 PM Roberta Leigh,  [facility] LUNGTX [facility]   8/17/2023  2:20 PM SIX, MINUTE WALK [facility] PUL WLK [facility]   9/6/2023  9:00 AM Dinah Klein, NP [facility] PCW   9/6/2023 11:00 AM [facility] OIC-CT1 500 LB LIMIT [facility]   9/18/2023  8:50 AM LAB, APPOINTMENT [facility]   9/18/2023 10:40 AM SIX, MINUTE WALK [facility] PUL WLK [facility]   9/18/2023  1:00 PM Claire Pelaez, NP [facility] HEARTTX [facility]   10/19/2023  7:00 AM LAB, [place] [facility] [place]   10/26/2023  1:30 PM Aly Rand MD [facility] [place]   10/27/2023 11:00 AM Viktoriya Camara, NP [facility] PAINMGT [facility]       Leesa Barillas MD  8/7/2023 1:36 PM    Primary Care Internal Medicine

## 2023-07-31 NOTE — TELEPHONE ENCOUNTER
----- Message from Crystal Saab sent at 7/31/2023  9:56 AM CDT -----  Contact: [phone]  Previous message the temp is 100.7

## 2023-07-31 NOTE — Clinical Note
Discussed with patient and spouse, recent CXR shows worsening of scleroderma related ILD since last imaging in december

## 2023-08-01 ENCOUNTER — TELEPHONE (OUTPATIENT)
Dept: TRANSPLANT | Facility: CLINIC | Age: 73
End: 2023-08-01
Payer: MEDICARE

## 2023-08-01 ENCOUNTER — LAB VISIT (OUTPATIENT)
Dept: LAB | Facility: HOSPITAL | Age: 73
End: 2023-08-01
Attending: INTERNAL MEDICINE
Payer: MEDICARE

## 2023-08-01 DIAGNOSIS — R05.9 COUGH, UNSPECIFIED TYPE: ICD-10-CM

## 2023-08-01 DIAGNOSIS — M34.9 SCLERODERMA WITH PULMONARY INVOLVEMENT: ICD-10-CM

## 2023-08-01 DIAGNOSIS — M34.9 SCLERODERMA WITH PULMONARY INVOLVEMENT: Primary | ICD-10-CM

## 2023-08-01 DIAGNOSIS — R06.02 SHORTNESS OF BREATH: ICD-10-CM

## 2023-08-01 DIAGNOSIS — R05.9 COUGH, UNSPECIFIED TYPE: Primary | ICD-10-CM

## 2023-08-01 DIAGNOSIS — J18.9 SYMPTOMS OF PNEUMONIA: ICD-10-CM

## 2023-08-01 DIAGNOSIS — R06.82 TACHYPNEA: ICD-10-CM

## 2023-08-01 DIAGNOSIS — R05.8 PRODUCTIVE COUGH: ICD-10-CM

## 2023-08-01 PROCEDURE — 87070 CULTURE OTHR SPECIMN AEROBIC: CPT | Mod: NTX | Performed by: INTERNAL MEDICINE

## 2023-08-01 PROCEDURE — 87205 SMEAR GRAM STAIN: CPT | Mod: TXP | Performed by: INTERNAL MEDICINE

## 2023-08-01 PROCEDURE — 36415 COLL VENOUS BLD VENIPUNCTURE: CPT | Mod: PN,TXP | Performed by: INTERNAL MEDICINE

## 2023-08-01 PROCEDURE — 83880 ASSAY OF NATRIURETIC PEPTIDE: CPT | Mod: TXP | Performed by: INTERNAL MEDICINE

## 2023-08-01 RX ORDER — LEVOFLOXACIN 750 MG/1
750 TABLET ORAL DAILY
Qty: 10 TABLET | Refills: 0 | Status: SHIPPED | OUTPATIENT
Start: 2023-08-01 | End: 2023-08-11

## 2023-08-01 NOTE — TELEPHONE ENCOUNTER
Per Dr. Leigh, patient to have BNP, Sputum culture, levaquin 750 mg daily x  10 days (note patient reports she has started docycyline 100 mg bid x 7 days). Patient to have echo completed as soon as possible.  Patient to RTC in 2 weeks (8/16 or 17) with Dr. Leigh, PFTs, 6MWT. Request to .

## 2023-08-01 NOTE — TELEPHONE ENCOUNTER
Prescription submitted per Dr. Leigh request: TORB: levaquin 750 mg daily x 10 days, no refills. Patient agreeable to [facility] pharmacy.

## 2023-08-01 NOTE — TELEPHONE ENCOUNTER
Returned call to patient. She is agreeable to scheduling next available echo, in area. Nothing available prior to her going  out of town tomorrow. She requested once she returns.  Offered [place]. She selected am 8/17 brandy wb, and then appt with Dr. Leigh on 8/17 pm PFTs, 6MWT. She is aware to have these tests prior to seeing Dr. Leigh.   Confirmed BNP in progress, en route from [facility] lab per Jesika in lab.  ----- Message from Marlene Back sent at 8/1/2023  5:08 PM CDT -----  Type:  Call Back     Who Called:pt    Does the patient know what this is regarding?:echo  Would the patient rather a call back or a response via [facility]? Call   Best Call Back Number:[phone]  Additional Information:

## 2023-08-02 ENCOUNTER — TELEPHONE (OUTPATIENT)
Dept: TRANSPLANT | Facility: CLINIC | Age: 73
End: 2023-08-02
Payer: MEDICARE

## 2023-08-02 LAB — BNP SERPL-MCNC: 69 PG/ML (ref 0–99)

## 2023-08-02 NOTE — TELEPHONE ENCOUNTER
----- Message from Roberta Leigh DO sent at 8/2/2023 10:18 AM CDT -----  Perfect thank you.    ----- Message -----  From: Johanna Fernandez  Sent: 8/2/2023  10:06 AM CDT  To: Roberta Leigh DO    Follow up per request - BNP completed. Unable to schedule echo until patient returns from being out of town. (Same day as visit and follow up testing - 8/17/23)

## 2023-08-04 LAB
BACTERIA SPEC AEROBE CULT: NORMAL
BACTERIA SPEC AEROBE CULT: NORMAL
GRAM STN SPEC: NORMAL

## 2023-08-17 ENCOUNTER — OFFICE VISIT (OUTPATIENT)
Dept: TRANSPLANT | Facility: CLINIC | Age: 73
End: 2023-08-17
Payer: MEDICARE

## 2023-08-17 ENCOUNTER — HOSPITAL ENCOUNTER (OUTPATIENT)
Dept: PULMONOLOGY | Facility: CLINIC | Age: 73
Discharge: HOME | End: 2023-08-17
Payer: MEDICARE

## 2023-08-17 ENCOUNTER — HOSPITAL ENCOUNTER (OUTPATIENT)
Dept: CARDIOLOGY | Facility: HOSPITAL | Age: 73
Discharge: HOME | End: 2023-08-17
Attending: STUDENT IN AN ORGANIZED HEALTH CARE EDUCATION/TRAINING PROGRAM
Payer: MEDICARE

## 2023-08-17 VITALS
WEIGHT: 151 LBS | BODY MASS INDEX: 25.16 KG/M2 | OXYGEN SATURATION: 98 % | HEIGHT: 65 IN | SYSTOLIC BLOOD PRESSURE: 144 MMHG | HEART RATE: 59 BPM | DIASTOLIC BLOOD PRESSURE: 65 MMHG | WEIGHT: 151 LBS | BODY MASS INDEX: 25.16 KG/M2 | HEIGHT: 65 IN

## 2023-08-17 VITALS — HEIGHT: 65 IN | BODY MASS INDEX: 25.16 KG/M2 | WEIGHT: 151 LBS

## 2023-08-17 DIAGNOSIS — M34.9 SCLERODERMA WITH PULMONARY INVOLVEMENT: ICD-10-CM

## 2023-08-17 DIAGNOSIS — M34.9 SCLERODERMA: ICD-10-CM

## 2023-08-17 DIAGNOSIS — M34.9 SCLERODERMA WITH PULMONARY INVOLVEMENT: Primary | ICD-10-CM

## 2023-08-17 DIAGNOSIS — K21.9 GASTROESOPHAGEAL REFLUX DISEASE, UNSPECIFIED WHETHER ESOPHAGITIS PRESENT: ICD-10-CM

## 2023-08-17 DIAGNOSIS — I27.20 PULMONARY HYPERTENSION: ICD-10-CM

## 2023-08-17 LAB
ASCENDING AORTA: 2.75 CM
AV INDEX (PROSTH): 0.75
AV MEAN GRADIENT: 7 MMHG
AV PEAK GRADIENT: 14 MMHG
AV VALVE AREA BY VELOCITY RATIO: 1.48 CM²
AV VALVE AREA: 1.57 CM²
AV VELOCITY RATIO: 0.71
BSA FOR ECHO PROCEDURE: 1.77 M2
CV ECHO LV RWT: 0.42 CM
DLCO ADJ PRE: 11.05 ML/(MIN*MMHG) (ref 16.02–27.49)
DLCO SINGLE BREATH LLN: 16.02
DLCO SINGLE BREATH PRE REF: 49.7 %
DLCO SINGLE BREATH REF: 21.76
DLCOC SBVA LLN: 2.87
DLCOC SBVA PRE REF: 97.4 %
DLCOC SBVA REF: 4.26
DLCOC SINGLE BREATH LLN: 16.02
DLCOC SINGLE BREATH PRE REF: 50.8 %
DLCOC SINGLE BREATH REF: 21.76
DLCOCSBVAULN: 5.65
DLCOCSINGLEBREATHULN: 27.49
DLCOSINGLEBREATHULN: 27.49
DLCOVA LLN: 2.87
DLCOVA PRE REF: 95.2 %
DLCOVA PRE: 4.06 ML/(MIN*MMHG*L) (ref 2.87–5.65)
DLCOVA REF: 4.26
DLCOVAULN: 5.65
DLVAADJ PRE: 4.15 ML/(MIN*MMHG*L) (ref 2.87–5.65)
DOP CALC AO PEAK VEL: 1.84 M/S
DOP CALC AO VTI: 42.4 CM
DOP CALC LVOT AREA: 2.1 CM2
DOP CALC LVOT DIAMETER: 1.63 CM
DOP CALC LVOT PEAK VEL: 1.31 M/S
DOP CALC LVOT STROKE VOLUME: 66.74 CM3
DOP CALC MV VTI: 32.5 CM
DOP CALCLVOT PEAK VEL VTI: 32 CM
E WAVE DECELERATION TIME: 211.78 MSEC
E/A RATIO: 1.01
E/E' RATIO: 8.42 M/S
ECHO LV POSTERIOR WALL: 0.96 CM (ref 0.6–1.1)
ERV LLN: -16449.36
ERV PRE REF: 103.3 %
ERV REF: 0.64
ERVULN: ABNORMAL
FEF 25 75 LLN: 0.84
FEF 25 75 PRE REF: 73.2 %
FEF 25 75 REF: 1.85
FEV05 LLN: 0.88
FEV05 REF: 1.74
FEV1 FVC LLN: 64
FEV1 FVC PRE REF: 102.4 %
FEV1 FVC REF: 78
FEV1 LLN: 1.61
FEV1 PRE REF: 63.8 %
FEV1 REF: 2.23
FRACTIONAL SHORTENING: 43 % (ref 28–44)
FRCPLETH LLN: 1.95
FRCPLETH PREREF: 71.7 %
FRCPLETH REF: 2.77
FRCPLETHULN: 3.59
FVC LLN: 2.1
FVC PRE REF: 61.8 %
FVC REF: 2.89
INTERVENTRICULAR SEPTUM: 0.96 CM (ref 0.6–1.1)
IVC PRE: 1.7 L (ref 2.1–3.73)
IVC SINGLE BREATH LLN: 2.1
IVC SINGLE BREATH PRE REF: 58.6 %
IVC SINGLE BREATH REF: 2.89
IVCSINGLEBREATHULN: 3.73
LA MAJOR: 5.21 CM
LA MINOR: 4.87 CM
LA WIDTH: 4.4 CM
LEFT ATRIUM SIZE: 3.83 CM
LEFT ATRIUM VOLUME INDEX: 41 ML/M2
LEFT ATRIUM VOLUME: 72.11 CM3
LEFT INTERNAL DIMENSION IN SYSTOLE: 2.64 CM (ref 2.1–4)
LEFT VENTRICLE DIASTOLIC VOLUME INDEX: 55.17 ML/M2
LEFT VENTRICLE DIASTOLIC VOLUME: 97.1 ML
LEFT VENTRICLE MASS INDEX: 85 G/M2
LEFT VENTRICLE SYSTOLIC VOLUME INDEX: 14.5 ML/M2
LEFT VENTRICLE SYSTOLIC VOLUME: 25.52 ML
LEFT VENTRICULAR INTERNAL DIMENSION IN DIASTOLE: 4.6 CM (ref 3.5–6)
LEFT VENTRICULAR MASS: 150.22 G
LLN IC: -16447.94
LV LATERAL E/E' RATIO: 7.77 M/S
LV SEPTAL E/E' RATIO: 9.18 M/S
LVOT MG: 3.61 MMHG
LVOT MV: 0.88 CM/S
MV MEAN GRADIENT: 2 MMHG
MV PEAK A VEL: 1 M/S
MV PEAK E VEL: 1.01 M/S
MV PEAK GRADIENT: 7 MMHG
MV STENOSIS PRESSURE HALF TIME: 61.42 MS
MV VALVE AREA BY CONTINUITY EQUATION: 2.05 CM2
MV VALVE AREA P 1/2 METHOD: 3.58 CM2
PEF LLN: 3.96
PEF PRE REF: 102 %
PEF REF: 5.73
PHYSICIAN COMMENT: ABNORMAL
PISA MRMAX VEL: 5.4 M/S
PISA TR MAX VEL: 3.05 M/S
PRE DLCO: 10.8 ML/(MIN*MMHG) (ref 16.02–27.49)
PRE ERV: 0.67 L (ref -16449.36–16450.64)
PRE FEF 25 75: 1.35 L/S (ref 0.84–3.3)
PRE FET 100: 6.82 SEC
PRE FEV05 REF: 70.7 %
PRE FEV1 FVC: 79.65 % (ref 64.31–89.41)
PRE FEV1: 1.42 L (ref 1.61–2.82)
PRE FEV5: 1.23 L (ref 0.88–2.59)
PRE FRC PL: 1.99 L (ref 1.95–3.59)
PRE FVC: 1.79 L (ref 2.1–3.73)
PRE IC: 1.12 L (ref -16447.94–16452.06)
PRE PEF: 5.84 L/S (ref 3.96–7.49)
PRE REF IC: 54.3 %
PRE RV: 1.32 L (ref 1.55–2.7)
PRE TLC: 3.11 L (ref 4.12–6.09)
PV PEAK GRADIENT: 3 MMHG
PV PEAK VELOCITY: 0.91 M/S
RA MAJOR: 4.82 CM
RA PRESSURE ESTIMATED: 3 MMHG
RAW PRE REF: 130.9 %
RAW PRE: 4 CMH2O*S/L (ref 3.06–3.06)
RAW REF: 3.06
REF IC: 2.06
RIGHT VENTRICULAR END-DIASTOLIC DIMENSION: 3.16 CM
RV LLN: 1.55
RV PRE REF: 62.2 %
RV REF: 2.12
RV TB RVSP: 6 MMHG
RV TISSUE DOPPLER FREE WALL SYSTOLIC VELOCITY 1 (APICAL 4 CHAMBER VIEW): 13.76 CM/S
RVTLC LLN: 34
RVTLC PRE REF: 98.6 %
RVTLC PRE: 42.48 % (ref 33.51–52.69)
RVTLC REF: 43
RVTLCULN: 53
RVULN: 2.7
SGAW PRE REF: 102.9 %
SGAW PRE: 0.1 1/(CMH2O*S) (ref 0.1–0.1)
SGAW REF: 0.1
SINUS: 2.75 CM
STJ: 1.92 CM
TDI LATERAL: 0.13 M/S
TDI SEPTAL: 0.11 M/S
TDI: 0.12 M/S
TLC LLN: 4.12
TLC PRE REF: 60.9 %
TLC REF: 5.11
TLC ULN: 6.09
TR MAX PG: 37 MMHG
TRICUSPID ANNULAR PLANE SYSTOLIC EXCURSION: 2.88 CM
TV REST PULMONARY ARTERY PRESSURE: 40 MMHG
ULN IC: ABNORMAL
VA PRE: 2.66 L (ref 4.96–4.96)
VA SINGLE BREATH LLN: 4.96
VA SINGLE BREATH PRE REF: 53.7 %
VA SINGLE BREATH REF: 4.96
VASINGLEBREATHULN: 4.96
VC LLN: 2.1
VC PRE REF: 61.8 %
VC PRE: 1.79 L (ref 2.1–3.73)
VC REF: 2.89
VC ULN: 3.73
Z-SCORE OF LEFT VENTRICULAR DIMENSION IN END DIASTOLE: -0.56
Z-SCORE OF LEFT VENTRICULAR DIMENSION IN END SYSTOLE: -1.04

## 2023-08-17 PROCEDURE — 99214 OFFICE O/P EST MOD 30 MIN: CPT | Mod: PBBFAC,25,NTX | Performed by: INTERNAL MEDICINE

## 2023-08-17 PROCEDURE — 94010 BREATHING CAPACITY TEST: CPT | Mod: 26,S$PBB,NTX, | Performed by: INTERNAL MEDICINE

## 2023-08-17 PROCEDURE — 94726 PLETHYSMOGRAPHY LUNG VOLUMES: CPT | Mod: 26,S$PBB,NTX, | Performed by: INTERNAL MEDICINE

## 2023-08-17 PROCEDURE — 94010 BREATHING CAPACITY TEST: ICD-10-PCS | Mod: 26,S$PBB,NTX, | Performed by: INTERNAL MEDICINE

## 2023-08-17 PROCEDURE — 94726 PULM FUNCT TST PLETHYSMOGRAP: ICD-10-PCS | Mod: 26,S$PBB,NTX, | Performed by: INTERNAL MEDICINE

## 2023-08-17 PROCEDURE — 94729 PR C02/MEMBANE DIFFUSE CAPACITY: ICD-10-PCS | Mod: 26,S$PBB,NTX, | Performed by: INTERNAL MEDICINE

## 2023-08-17 PROCEDURE — 94618 PULMONARY STRESS TESTING: ICD-10-PCS | Mod: 26,S$PBB,NTX, | Performed by: INTERNAL MEDICINE

## 2023-08-17 PROCEDURE — 99214 PR OFFICE/OUTPT VISIT, EST, LEVL IV, 30-39 MIN: ICD-10-PCS | Mod: 25,S$PBB,NTX, | Performed by: INTERNAL MEDICINE

## 2023-08-17 PROCEDURE — 99214 OFFICE O/P EST MOD 30 MIN: CPT | Mod: 25,S$PBB,NTX, | Performed by: INTERNAL MEDICINE

## 2023-08-17 PROCEDURE — 93306 TTE W/DOPPLER COMPLETE: CPT | Mod: NTX

## 2023-08-17 PROCEDURE — 94729 DIFFUSING CAPACITY: CPT | Mod: PBBFAC,NTX | Performed by: INTERNAL MEDICINE

## 2023-08-17 PROCEDURE — 94726 PLETHYSMOGRAPHY LUNG VOLUMES: CPT | Mod: PBBFAC,NTX | Performed by: INTERNAL MEDICINE

## 2023-08-17 PROCEDURE — 99999 PR PBB SHADOW E&M-EST. PATIENT-LVL IV: CPT | Mod: PBBFAC,TXP,, | Performed by: INTERNAL MEDICINE

## 2023-08-17 PROCEDURE — 99999 PR PBB SHADOW E&M-EST. PATIENT-LVL IV: ICD-10-PCS | Mod: PBBFAC,TXP,, | Performed by: INTERNAL MEDICINE

## 2023-08-17 PROCEDURE — 93306 TTE W/DOPPLER COMPLETE: CPT | Mod: 26,NTX,, | Performed by: INTERNAL MEDICINE

## 2023-08-17 PROCEDURE — 94010 BREATHING CAPACITY TEST: CPT | Mod: PBBFAC,NTX | Performed by: INTERNAL MEDICINE

## 2023-08-17 PROCEDURE — 94729 DIFFUSING CAPACITY: CPT | Mod: 26,S$PBB,NTX, | Performed by: INTERNAL MEDICINE

## 2023-08-17 PROCEDURE — 93306 ECHO (CUPID ONLY): ICD-10-PCS | Mod: 26,NTX,, | Performed by: INTERNAL MEDICINE

## 2023-08-17 PROCEDURE — 94618 PULMONARY STRESS TESTING: CPT | Mod: 26,S$PBB,NTX, | Performed by: INTERNAL MEDICINE

## 2023-08-17 PROCEDURE — 94618 PULMONARY STRESS TESTING: CPT | Mod: PBBFAC,NTX | Performed by: INTERNAL MEDICINE

## 2023-08-17 NOTE — PROGRESS NOTES
ADVANCED LUNG DISEASE CLINIC FOLLOW-UP                                                                                                                                             Reason for Visit:  SSc-ILD    Referring Physician: No ref. provider found    History of Present Illness: Yamel Shah is a 72 y.o. female who is on 0L of oxygen.  She is on no assisted ventilation.  Her New York Heart Association Class is II and a Karnofsky score of 90% - Able to carry on normal activity: minor symptoms of disease. She is not diabetic.    She presents today for routine SSc-ILD follow-up. Had recent LRTI and was initially placed on doxycycline, but transitioned to levofloxacin for worsening symptoms. She reports interval improvement in her respiratory symptoms. Feels as though she is back at her baseline, but admits to recent inactivity. Continues to have reflux despite PPI therapy, but sleeps on an incline and is compliant with diet modification. Has PH and remains on Adcirca. Notes trace pedal edema. Overall, doing very well. No recent hospitalizations. On MMF and tolerating well. Has appointment with PH clinic September 18.     Past Medical History:   Diagnosis Date    Abnormal Pap smear     Acid reflux     Allergy     Arthritis     Encounter for blood transfusion     GERD (gastroesophageal reflux disease)     Hiatal hernia 03/24/2023    History of migraine headaches     Hx of colonic polyp     Hypertension     Idiopathic neuropathy 07/20/2012    ILD (interstitial lung disease) 11/06/2013    Iron deficiency anemia 03/18/2014    MRSA carrier     Osteopenia     Pneumonia     Pulmonary fibrosis     Pulmonary hypertension     Raynaud's disease     Scleroderma, diffuse     Sjogren's syndrome     Vitamin D deficiency 11/14/2013       Past Surgical History:   Procedure Laterality Date    24 HOUR IMPEDANCE PH MONITORING OF ESOPHAGUS IN PATIENT NOT TAKING ACID REDUCING MEDICATIONS N/A 03/04/2020    Procedure:  IMPEDANCE PH STUDY, ESOPHAGEAL, 24 HOUR, IN PATIENT NOT TAKING ACID REDUCING MEDICATION;  Surgeon: Annamaria Mendoza MD;  Location: [facility] (4TH FLR);  Service: Endoscopy;  Laterality: N/A;  OFF PPI/H2 Blocker   Motility Studies   Hold Narcotics x 1 days   Hold TCA x 1 days  2/26 - LVM attempting to confirm appt  2/27 - Confirmed appt    ANORECTAL MANOMETRY N/A 05/10/2021    Procedure: MANOMETRY, ANORECTAL with balloon expulsion test;  Surgeon: Annamaria Mendoza MD;  Location: [facility] (4TH FLR);  Service: Endoscopy;  Laterality: N/A;  order combined  covid test 5/7 [facility], instructions emailed-\[facility] [place] [facility]\""    BREAST BIOPSY      Left, benign    CERVICAL CONIZATION   W/ LASER  1970    COLONOSCOPY      COLONOSCOPY N/A 03/29/2019    Procedure: COLONOSCOPY;  Surgeon: Annamaria Mendoza MD;  Location: [facility] (2ND FLR);  Service: Endoscopy;  Laterality: N/A;    COLONOSCOPY N/A 05/10/2021    Procedure: COLONOSCOPY;  Surgeon: Annamaria Mendoza MD;  Location: [facility] (4TH FLR);  Service: Endoscopy;  Laterality: N/A;  2nd floor-previous scopes done on 2nd floor, gastroparesis  full liquid diet x2 days, clear liquid x1 day prior to procedure  covid test 5/7 [facility], instructions emailed-\[facility] [place] [facility]\""  5/6 pt confirmed appt-[facility]    DILATION AND CURETTAGE OF UTERUS      ESOPHAGEAL MANOMETRY WITH MEASUREMENT OF IMPEDANCE N/A 03/04/2020    Procedure: MANOMETRY, ESOPHAGUS, WITH IMPEDANCE MEASUREMENT;  Surgeon: Annamaria Mendoza MD;  Location: [facility] (4TH FLR);  Service: Endoscopy;  Laterality: N/A;  OFF PPI/H2 Blocker   Motility Studies   Hold Narcotics x 1 days   Hold TCA x 1 days    ESOPHAGOGASTRODUODENOSCOPY      ESOPHAGOGASTRODUODENOSCOPY N/A 03/29/2019    Procedure: EGD (ESOPHAGOGASTRODUODENOSCOPY);  Surgeon: Annamaria Mendoza MD;  Location: [facility] ENDO (2ND FLR);  Service: Endoscopy;  Laterality: N/A;  pulmonary htn    ESOPHAGOGASTRODUODENOSCOPY N/A 02/02/2021    Procedure: ESOPHAGOGASTRODUODENOSCOPY (EGD);  Surgeon: Annamaria Mendoza MD;   Location: [facility] ENDO (2ND FLR);  Service: Endoscopy;  Laterality: N/A;  2nd floor gastroparesis  3 days full liquid diet and 1 day clears  covid test 1/30 primary care, instructions sent to [facility]    ESOPHAGOGASTRODUODENOSCOPY N/A 07/01/2022    Procedure: ESOPHAGOGASTRODUODENOSCOPY (EGD);  Surgeon: Annamaria Mendoza MD;  [facility]  Service: Endoscopy;  Laterality: N/A;  2nd floor-gastroparesis  full liquid diet x3 days, clear liquid diet x1 day prior to procedure  fully vaccinated, instructions sent to [facility]  6/29-pt confirmed new arrival time-[facility]    EXCISION, LESION, STOMACH, LAPAROSCOPIC N/A 03/23/2023    Procedure: XI ROBOTIC EXCISION, LESION, STOMACH;  Surgeon: Katherine Segura MD;  Location: [address];  Service: General;  Laterality: N/A;    EXCISIONAL BIOPSY, LYMPH NODE Right 05/26/2023    Procedure: EXCISIONAL BIOPSY, LYMPH NODE, INGUINAL;  Surgeon: Katherine Segura MD;  Location: [address];  Service: General;  Laterality: Right;    HYSTERECTOMY  1990    FRANDY (AUB, Fibroids), ovaries remain    REPAIR, HERNIA, UMBILICAL N/A 03/23/2023    Procedure: REPAIR, HERNIA, UMBILICAL;  Surgeon: Katherine Segura MD;  Location: [address];  Service: General;  Laterality: N/A;    RIGHT HEART CATHETERIZATION Right 01/05/2021    Procedure: INSERTION, CATHETER, RIGHT HEART;  Surgeon: Aureliano Sy MD;  Location: [facility] CATH LAB;  Service: Cardiology;  Laterality: Right;    RIGHT HEART CATHETERIZATION Right 03/16/2023    Procedure: INSERTION, CATHETER, RIGHT HEART;  Surgeon: Aureliano Sy MD;  Location: [facility] CATH LAB;  Service: Cardiology;  Laterality: Right;    ROBOT-ASSISTED LAPAROSCOPIC REPAIR OF INGUINAL HERNIA USING DA VERNELL XI Right 05/26/2023    Procedure: XI ROBOTIC REPAIR, HERNIA, INGUINAL, FEMORAL;  Surgeon: Katherine Seugra MD;  Location: [address];  Service: General;  Laterality: Right;    ROBOT-ASSISTED  REPAIR OF HIATAL HERNIA USING DA VERNELL XI N/A 03/23/2023    Procedure: XI ROBOTIC REPAIR, HERNIA, HIATAL;  Surgeon: Katherine Segura MD;  Location: [facility] OR 2ND FLR;  Service: General;  Laterality: N/A;    VARICOSE VEIN SURGERY      XI ROBOTIC GASTROPEXY N/A 03/23/2023    Procedure: XI ROBOTIC GASTROPEXY;  Surgeon: Katherine Segura MD;  Location: [facility] OR 2ND FLR;  Service: General;  Laterality: N/A;    XI ROBOTIC PYLOROMYOTOMY N/A 03/23/2023    Procedure: XI ROBOTIC PYLOROMYOTOMY;  Surgeon: Katherine Segura MD;  [facility] 2[facility] F[facility];  Service: General;  Laterality: N/A;       Allergies: Sulfa (sulfonamide antibiotics) and Tramadol    Current Outpatient Medications   Medication Sig    acetaminophen-codeine 300-30mg (TYLENOL #3) 300-30 mg Tab Take 1 tablet by mouth daily as needed (pain).    albuterol (VENTOLIN HFA) 90 mcg/actuation inhaler Inhale 2 puffs into the lungs every 4 (four) hours as needed for Wheezing. Rescue    carboxymethylcellulose sodium (REFRESH TEARS) 0.5 % Drop Apply 1-2 drops to every as needed    doxycycline (VIBRA-TABS) 100 MG tablet Take 1 tablet (100 mg total) by mouth 2 (two) times daily.    ergocalciferol (ERGOCALCIFEROL) 50,000 unit Cap Take 1 capsule (50,000 Units total) by mouth every 7 days.    ferrous sulfate 325 (65 FE) MG EC tablet Take 325 mg by mouth every morning.    flu vac 2022 65up-istKU88G,PF, (FLUAD QUAD 2022-23,65Y UP,,PF,) 60 mcg (15 mcg x 4)/0.5 mL Syrg Inject into the muscle.    multivitamin capsule Take 1 capsule by mouth once daily.    mycophenolate mofetil (CELLCEPT) 200 mg/mL SusR Take 5 mLs (1,000 mg total) by mouth 2 (two) times daily.    NIFEdipine (ADALAT CC) 90 MG TbSR TAKE 1 TABLET(90 MG) BY MOUTH EVERY DAY (Patient taking differently: Take by mouth nightly. TAKE 1 TABLET(90 MG) BY MOUTH EVERY DAY (TAKES NIGHTLY WITH 30 MG TABLET TOTAL 120 MG))    NIFEdipine (PROCARDIA-XL) 30 MG (OSM) 24 hr tablet TAKE 1 TABLET(30 MG) BY MOUTH EVERY  DAY (Patient taking differently: Take by mouth nightly. TAKE WITH 90 MG TABLET EVERY NIGHT (TOTAL 120 MG))    pravastatin (PRAVACHOL) 20 MG tablet Take 1 tablet (20 mg total) by mouth every evening.    pregabalin (LYRICA) 300 MG Cap Take 1 capsule (300 mg total) by mouth 2 (two) times daily.    PREVIDENT 5000 BOOSTER PLUS 1.1 % Pste SMARTSIG:To Teeth    PREVIDENT 5000 SENSITIVE 1.1-5 % Pste BRUSH FOR 2 MINUTES TWICE PER DAY    RABEprazole (ACIPHEX) 20 mg tablet Take 1 tablet (20 mg total) by mouth 2 (two) times daily.    sars-cov-2, covid-19, (MODERNA COVID-19) 50 mcg/0.25 ml injection (BOOSTER) Inject into the muscle.    tadalafil (ADCIRCA) 20 mg Tab Take 2 tablets (40 mg total) by mouth once daily. (Patient taking differently: Take 40 mg by mouth every evening.)    tiZANidine (ZANAFLEX) 4 MG tablet Take 1 tablet (4 mg total) by mouth nightly as needed (muscle pain).     No current facility-administered medications for this visit.       Immunization History   Administered Date(s) Administered    COVID-19 MRNA, LN-S PF (MODERNA HALF 0.25 ML DOSE) 03/12/2021, 04/21/2022    COVID-19, MRNA, LN-S, PF (MODERNA FULL 0.5 ML DOSE) 02/09/2021, 09/21/2021    COVID-19, MRNA, LN-S, PF (Pfizer) (Purple Cap) 10/13/2022    COVID-19, mRNA, LNP-S, bivalent booster, PF (Moderna Omicron) 10/13/2022    Influenza 11/02/2015, 09/21/2021    Influenza (FLUAD) - Quadrivalent - Adjuvanted - PF *Preferred* (65+) 09/11/2020, 09/12/2022    Influenza - High Dose - PF (65 years and older) 10/09/2017, 10/27/2018, 09/09/2019    Influenza - Quadrivalent - PF *Preferred* (6 months and older) 11/03/2006, 10/08/2007, 09/29/2009, 09/26/2014, 11/02/2015, 09/27/2016    Influenza Split 11/03/2006, 10/08/2007, 09/29/2009, 09/26/2014    Pneumococcal Conjugate - 13 Valent 01/16/2013, 01/16/2013    Pneumococcal Polysaccharide - 23 Valent 09/27/2016, 11/12/2021    Tdap 02/06/2019    Zoster 01/16/2013    Zoster Recombinant 11/30/2018, 02/12/2019     Family  History:    Family History   Problem Relation Age of Onset    Breast cancer Mother     Cancer Mother         Breast    Hypertension Father     Diabetes Father         Amputation of legs    Breast cancer Sister     Diabetes Sister     Arthritis Sister     Cancer Sister         Breast    Osteoarthritis Brother     Diabetes Brother     Arthritis Brother     Birth defects Brother         Polio at birth, recently had knee replacement surgery on left knee    No Known Problems Daughter     No Known Problems Daughter     No Known Problems Son     No Known Problems Son     Breast cancer Maternal Aunt     Cancer Maternal Aunt         Breast    Early death Sister     Melanoma Neg Hx     Colon cancer Neg Hx     Crohn's disease Neg Hx     Stomach cancer Neg Hx     Ulcerative colitis Neg Hx     Rectal cancer Neg Hx     Irritable bowel syndrome Neg Hx     Esophageal cancer Neg Hx     Celiac disease Neg Hx     Ovarian cancer Neg Hx     Liver cancer Neg Hx     Pancreatic cancer Neg Hx      Social History     Substance and Sexual Activity   Alcohol Use Never    Comment: wine occasionally      Social History     Substance and Sexual Activity   Drug Use No      Social History     Socioeconomic History    Marital status:      Spouse name: Lewis    Number of children: 4   Occupational History    Occupation: -retired     Employer: [employer]     Comment: [employer]     Employer: RETIRED   Tobacco Use    Smoking status: Never     Passive exposure: Never    Smokeless tobacco: Never   Substance and Sexual Activity    Alcohol use: Never     Comment: wine occasionally    Drug use: No    Sexual activity: Yes     Partners: Male     Birth control/protection: Post-menopausal, None   Other Topics Concern    Are you pregnant or think you may be? No    Breast-feeding No   Social History Narrative         Social Determinants of Health     Financial Resource Strain: Low Risk  (8/12/2023)    Overall Financial Resource  Strain (CARDIA)     Difficulty of Paying Living Expenses: Not very hard   Food Insecurity: No Food Insecurity (8/12/2023)    Hunger Vital Sign     Worried About Running Out of Food in the Last Year: Never true     Ran Out of Food in the Last Year: Never true   Transportation Needs: No Transportation Needs (8/12/2023)    PRAPARE - Transportation     Lack of Transportation (Medical): No     Lack of Transportation (Non-Medical): No   Physical Activity: Insufficiently Active (8/12/2023)    Exercise Vital Sign     Days of Exercise per Week: 2 days     Minutes of Exercise per Session: 30 min   Stress: No Stress Concern Present (8/12/2023)    [place] [employer] - Occupational Stress Questionnaire     Feeling of Stress : Only a little   Social Connections: Socially Isolated (8/12/2023)    Social Connection and Isolation Panel [NHANES]     Frequency of Communication with Friends and Family: Twice a week     Frequency of Social Gatherings with Friends and Family: Never     Attends Druze Services: Never     Active Member of Clubs or Organizations: No     Attends Club or Organization Meetings: Never     Marital Status:    Housing Stability: Low Risk  (8/12/2023)    Housing Stability Vital Sign     Unable to Pay for Housing in the Last Year: No     Number of Places Lived in the Last Year: 1     Unstable Housing in the Last Year: No     Review of Systems   Constitutional:  Negative for chills, diaphoresis, fever, malaise/fatigue and weight loss.   HENT:  Negative for congestion, ear discharge, ear pain, hearing loss, nosebleeds, sinus pain, sore throat and tinnitus.    Eyes:  Negative for blurred vision, double vision, photophobia, pain, discharge and redness.   Respiratory:  Positive for shortness of breath (heavy exertion only). Negative for cough, hemoptysis, sputum production, wheezing and stridor.    Cardiovascular:  Negative for chest pain, palpitations, orthopnea, claudication, leg swelling  and PND.   Gastrointestinal:  Positive for heartburn. Negative for abdominal pain, blood in stool, constipation, diarrhea, melena, nausea and vomiting.   Genitourinary:  Negative for dysuria, flank pain, frequency, hematuria and urgency.   Musculoskeletal:  Negative for back pain, falls, joint pain, myalgias and neck pain.   Skin:  Negative for itching and rash.   Neurological:  Negative for dizziness, tingling, tremors, sensory change, speech change, focal weakness, seizures, loss of consciousness, weakness and headaches.   Endo/Heme/Allergies:  Negative for environmental allergies and polydipsia. Does not bruise/bleed easily.   Psychiatric/Behavioral:  Negative for depression, hallucinations, memory loss, substance abuse and suicidal ideas. The patient is not nervous/anxious and does not have insomnia.      Vitals  There were no vitals taken for this visit.  Physical Exam  Vitals and nursing note reviewed.   Constitutional:       General: She is not in acute distress.     Appearance: She is well-developed. She is not diaphoretic.   HENT:      Head: Normocephalic and atraumatic.      Nose: Nose normal.      Mouth/Throat:      Pharynx: No oropharyngeal exudate.   Eyes:      General: No scleral icterus.     Conjunctiva/sclera: Conjunctivae normal.      Pupils: Pupils are equal, round, and reactive to light.   Neck:      Thyroid: No thyromegaly.      Vascular: No JVD.      Trachea: No tracheal deviation.   Cardiovascular:      Rate and Rhythm: Normal rate and regular rhythm.      Heart sounds: Normal heart sounds. No murmur heard.     No friction rub. No gallop.   Pulmonary:      Effort: Pulmonary effort is normal. No respiratory distress.      Breath sounds: Rales present. No wheezing.   Abdominal:      General: Bowel sounds are normal. There is no distension.      Palpations: Abdomen is soft.      Tenderness: There is no abdominal tenderness.   Musculoskeletal:         General: No deformity. Normal range of motion.       Cervical back: Normal range of motion and neck supple.   Skin:     General: Skin is warm and dry.      Capillary Refill: Capillary refill takes less than 2 seconds.      Coloration: Skin is not pale.      Findings: No erythema or rash.      Comments: Skin tightening.  Digital ulcers and nailbed deformities.  Bilateral foot ulcers in dry intact dressing     Neurological:      Mental Status: She is alert and oriented to person, place, and time.      Cranial Nerves: No cranial nerve deficit.   Psychiatric:         Judgment: Judgment normal.         Labs:      Pulmonary Function Tests 8/17/2023 5/23/2023 10/7/2022 1/3/2022 5/21/2021 9/11/2020 3/12/2019   FVC 1.79 1.9 1.82 1.83 1.85 1.99 1.82   FEV1 1.42 1.58 1.49 1.44 1.48 1.57 1.43   TLC (liters) 3.11 3.41 3.2 - 3.18 2.23 3.13   DLCO (ml/mmHg sec) 10.8 11.37 11.89 - 14.11 13.84 15   FVC% 61.8 71.1 67 67 68 72 60   FEV1% 63.8 75.6 71 68 69 73 61   FEF 25-75 1.35 1.82 1.7 - 1.57 1.55 1.32   FEF 25-75% 73.2 103.5 95 - 85 84 58   TLC% 60.9 66.8 63 - 62 63 62   RV 1.32 1.46 1.33 - 1.24 1.15 1.21   RV% 62.2 68.5 63 - 59 55 61   DLCO% 49.7 52.3 54 - 64 62 82     6MW 5/22/2023 2/28/2023 10/7/2022 5/3/2022 7/6/2021 12/15/2020 10/23/2019   6MWT Status completed without stopping completed without stopping completed without stopping completed without stopping completed without stopping completed without stopping completed without stopping   Patient Reported No complaints Other (Comment) Dyspnea;Lightheadedness Dyspnea;Leg pain Dyspnea Dyspnea Dyspnea;Leg pain   Was O2 used? No No No No No No No   6MW Distance walked (feet) 1500 1500 1400 1400 1400 1380 1362   Distance walked (meters) 457.2 457.2 426.72 426.72 426.72 420.62 415.14   Did patient stop? No No No No No No No   Oxygen Saturation 100 98 98 99 98 95 99   Supplemental Oxygen Room Air Room Air Room Air Room Air Room Air Room Air Room Air   Heart Rate 62 74 85 71 66 65 76   Blood Pressure 109/51 133/63 126/61 115/54  119/58 130/57 133/80   Ju Dyspnea Rating  moderate moderate moderate moderate moderate moderate light   Oxygen Saturation 98 94 97 96 97 - 99   Supplemental Oxygen Room Air Room Air Room Air Room Air Room Air Room Air Room Air   Heart Rate 90 113 108 88 97 94 92   Blood Pressure 136/62 169/76 138/63 138/63 135/63 139/64 136/60   Unable to obtain - - - - - Oxygen Saturation -   Ju Dyspnea Rating  somewhat heavy somewhat heavy somewhat heavy somewhat heavy somewhat heavy somewhat heavy moderate   Recovery Time (seconds) 131 160 39 130 145 124 78   Oxygen Saturation 100 97 97 98 99 99 99   Supplemental Oxygen Room Air Room Air Room Air Room Air Room Air Room Air Room Air   Heart Rate 71 84 103 83 72 71 71       Imaging:  Results for orders placed during the hospital encounter of 12/22/20    CT Chest Without Contrast    Narrative  EXAMINATION:  CT CHEST WITHOUT CONTRAST    CLINICAL HISTORY:  pulmonary hypertension; Pulmonary hypertension, unspecified    TECHNIQUE:  Using low dose technique the chest was surveyed from above the pulmonary apices through the posterior costophrenic angles.  Data was reconstructed for multiplanar images in axial, sagittal and coronal planes and for maximal intensity projection images in the axial plane.    All CT scans at this facility use dose modulation, iterative reconstruction and/or weight based dosing when appropriate to reduce radiation dose to as low as reasonably achievable.    Xray dose: DLP = 152.03 mGy-cm.    COMPARISON:  CT chest abdomen without contrast 03/13/2019.  CT chest without contrast 09/18/2015.    FINDINGS:  Base of Neck: No significant abnormality.    Aorta: Left-sided aortic arch with 3 arterial branches. The aorta maintains normal caliber, contour and course. There is mild calcification of the thoracic aorta or coronary arteries.    Heart/pericardium: Normal size.  No pericardial effusion or calcification.  Calcification of the aortic valve annulus.    Pulmonary  vasculature: Pulmonary arteries distribute normally.  Pulmonary artery size is neither specific nor sensitive for normal or elevated pulmonary arterial pressures.    -There are four pulmonary veins.    Barbara/Mediastinum: No pathologic noelle enlargement.    Esophagus: The esophagus is mildly dilated with dependent fluid, similar to prior exam.    Upper Abdomen: No abnormality of the partially imaged upper abdomen.    Thoracic soft tissues: Normal. Both breasts are present.    Bones: No acute fracture. No suspicious lytic or sclerotic lesion.    Airways: Patent.    Lungs/pleura:    -Stable biapical scarring.    -Redemonstration of cystic changes and reticulation plus subsegmental ground-glass disease, most extensive in the lower lung zones.  Radiographic appearance is more typical for NSIP but could represent UIP in this patient with scleroderma.    -There are stable scattered foci of tree-in-bud nodularities, for example within the superior segment of the right lower lobe (axial series 4, image 211).  There is associated dilatation of multiple adjacent small airways.    -0.4 cm solid pulmonary nodule in the apical segment of the right upper lobe (axial series 4, image 83), stable dating back to 2015.    -No pleural fluid or thickening.No pleural calcification. No pneumothorax.    Impression  1.  Overall stable appearing chronic interstitial lung disease consistent with patient's history of scleroderma.  Radiographic appearance is more typical for NSIP and UIP, noting that UIP is a more common disease.    2.  Previously characterized tree-in-bud opacity along the superior aspect of the right oblique fissure appears stable and may reflect chronic inflammatory process.    3.  0.4 cm solid right upper lobe pulmonary nodule, stable dating back to 09/18/2015.  Such stability favors benign etiology.    4.  Persistent fluid-filled esophagus as noted on multiple priors placing the patient at increased risk for aspiration.   This may be related to the patient's history of scleroderma; clinical considerations will determine if further assessment of esophageal motility is warranted.    Electronically signed by resident: Leobardo Arellano  Date:    12/22/2020  Time:    11:43    Electronically signed by: Maxine Del Cid MD  Date:    12/22/2020  Time:    14:23      Cardiodiagnostics:  Results for orders placed during the hospital encounter of 12/29/21    Echo    Interpretation Summary  · The left ventricle is normal in size with normal systolic function.  · The estimated ejection fraction is 63%.  · Normal left ventricular diastolic function.  · Normal right ventricular size with normal right ventricular systolic function.  · Mild pulmonic regurgitation.  · Normal central venous pressure (3 mmHg).  · The estimated PA systolic pressure is 32 mmHg.  · Trivial posterior pericardial effusion.        Assessment:  1. Scleroderma with pulmonary involvement    2. Gastroesophageal reflux disease, unspecified whether esophagitis present    3. Pulmonary hypertension      Plan:     Followed for SSc-ILD with PAH. Currently on MMF suspension and tolerating well. No desaturations on 6MWT and distance unchanged. Has known PH which is stable on Adcirca. Has had decline in her FVC/DLCO, so would like to repeat her PFTs in 6 weeks given recent LRTI. Discussed indication for OFEV, but patient would like to defer therapy for now. Continue to monitor on routine visits.    Continue to follow with GI for GERD/scleroderma esophagus. Discussed elevation of the head of her bed to help with nocturnal symptoms. Continue with PPI.    Continue Adcirca and PH follow-up. Last mPAP 18 (stable from 2017).    RTC in 4 weeks with repeat 6MWT and spirometry.       Claire Pierre PA-C  [facility]

## 2023-08-17 NOTE — PROCEDURES
Yamel Shah is a 72 y.o.  female patient, who presents for a 6 minute walk test ordered by MD Steve.  The diagnosis is Interstitial Lung Disease; Scleroderma/CREST, Pulmonary Hypertension.  The patient's BMI is 25.1 kg/m2.  Predicted distance (lower limit of normal) is 307.45 meters.      Test Results:    The test was completed without stopping. The total time walked was 360 seconds. During walking, the patient reported:  Dyspnea. The patient used no assistive devices during testing.     08/17/2023---------Distance: 396.24 meters (1300 feet)     O2 Sat % Supplemental Oxygen Heart Rate Blood Pressure Ju Scale   Pre-exercise  (Resting) 98 % Room Air 70 bpm 141/63 mmHg 2   During Exercise 96 % Room Air 98 bpm 159/70 mmHg 4   Post-exercise  (Recovery) 100 % Room Air  82 bpm       Recovery Time: 100 seconds    Performing nurse/tech: Frannie ZALDIVAR      PREVIOUS STUDY:   05/22/2023---------Distance: 457.2 meters (1500 feet)       O2 Sat % Supplemental Oxygen Heart Rate Blood Pressure Ju Scale   Pre-exercise  (Resting) 100 % Room Air 62 bpm 109/51 mmHg 3   During Exercise 98 % Room Air 90 bpm 136/62 mmHg 4   Post-exercise  (Recovery) 100 % Room Air  71 bpm 120/54 mmHg         CLINICAL INTERPRETATION:  Six minute walk distance is 396.24 meters (1300 feet) with somewhat heavy dyspnea.  During exercise, there was no significant desaturation while breathing room air.  Blood pressure remained stable and Heart rate increased significantly with walking.  The patient did not report non-pulmonary symptoms during exercise.  Since the previous study in May 2023, exercise capacity may be somewhat worse.  Based upon age and body mass index, exercise capacity is normal.

## 2023-08-17 NOTE — LETTER
August 17, 2023        Luis Ahuja  [address]  [facility] [address]  Phone: [phone]  Fax: [phone]             [facility] 1st Fl  [address]  [facility] [address]  Phone: [phone]   Patient: Yamel Shah   MR Number: [id number]   YOB: 1950   Date of Visit: 8/17/2023       Dear Dr. Luis Ahuja    Thank you for referring Yamel Shah to me for evaluation. Attached you will find relevant portions of my assessment and plan of care.    If you have questions, please do not hesitate to call me. I look forward to following Yamel Shah along with you.    Sincerely,    Roberta Leigh, DO    Enclosure    If you would like to receive this communication electronically, please contact [email or web address] or [phone] to request [employer] access.    [employer] is a tool which provides read-only access to select patient information with whom you have a relationship. Its easy to use and provides real time access to review your patients record including encounter summaries, notes, results, and demographic information.    If you feel you have received this communication in error or would no longer like to receive these types of communications, please e-mail [email or web address]

## 2023-08-28 DIAGNOSIS — M34.9 SCLERODERMA WITH PULMONARY INVOLVEMENT: Primary | ICD-10-CM

## 2023-08-28 DIAGNOSIS — I27.20 PULMONARY HYPERTENSION: ICD-10-CM

## 2023-08-29 DIAGNOSIS — E55.9 VITAMIN D DEFICIENCY: ICD-10-CM

## 2023-08-29 NOTE — TELEPHONE ENCOUNTER
Refill Routing Note   Medication(s) are not appropriate for processing by [facility] for the following reason(s):      Medication outside of protocol    [facility] action(s):  Route Care Due:  None identified            Appointments  past 12m or future 3m with PCP    Date Provider   Last Visit   4/25/2023 Aly Rand MD   Next Visit   10/26/2023 Aly Rand MD   ED visits in past 90 days: 0        Note composed:1:12 PM 08/29/2023

## 2023-08-30 RX ORDER — ERGOCALCIFEROL 1.25 MG/1
50000 CAPSULE ORAL
Qty: 12 CAPSULE | Refills: 1 | Status: SHIPPED | OUTPATIENT
Start: 2023-08-30 | End: 2024-02-26

## 2023-09-05 ENCOUNTER — TELEPHONE (OUTPATIENT)
Dept: ADMINISTRATIVE | Facility: CLINIC | Age: 73
End: 2023-09-05
Payer: MEDICARE

## 2023-09-08 ENCOUNTER — HOSPITAL ENCOUNTER (OUTPATIENT)
Dept: RADIOLOGY | Facility: HOSPITAL | Age: 73
Discharge: HOME | End: 2023-09-08
Attending: INTERNAL MEDICINE
Payer: MEDICARE

## 2023-09-08 DIAGNOSIS — R91.1 PULMONARY NODULE/LESION, SOLITARY: ICD-10-CM

## 2023-09-08 DIAGNOSIS — R91.1 SOLITARY PULMONARY NODULE: ICD-10-CM

## 2023-09-08 DIAGNOSIS — R93.89 ABNORMAL CT OF THE CHEST: ICD-10-CM

## 2023-09-08 PROCEDURE — 71250 CT THORAX DX C-: CPT | Mod: 26,NTX,, | Performed by: STUDENT IN AN ORGANIZED HEALTH CARE EDUCATION/TRAINING PROGRAM

## 2023-09-08 PROCEDURE — 71250 CT CHEST WITHOUT CONTRAST: ICD-10-PCS | Mod: 26,NTX,, | Performed by: STUDENT IN AN ORGANIZED HEALTH CARE EDUCATION/TRAINING PROGRAM

## 2023-09-08 PROCEDURE — 71250 CT THORAX DX C-: CPT | Mod: TC,TXP

## 2023-09-12 ENCOUNTER — TELEPHONE (OUTPATIENT)
Dept: TRANSPLANT | Facility: CLINIC | Age: 73
End: 2023-09-12
Payer: MEDICARE

## 2023-09-12 ENCOUNTER — TELEPHONE (OUTPATIENT)
Dept: RHEUMATOLOGY | Facility: CLINIC | Age: 73
End: 2023-09-12
Payer: MEDICARE

## 2023-09-12 ENCOUNTER — PATIENT MESSAGE (OUTPATIENT)
Dept: TRANSPLANT | Facility: CLINIC | Age: 73
End: 2023-09-12
Payer: MEDICARE

## 2023-09-12 NOTE — TELEPHONE ENCOUNTER
Notified patient via [facility] message.   ----- Message from Roberta Leigh DO sent at 9/12/2023  9:16 AM CDT -----  Follow-up in clinic as scheduled with PFTs.  Thanks    ----- Message -----  From: Johanna Fernandez  Sent: 9/11/2023   5:34 PM CDT  To: Roberta Leigh DO    Please advise if any further action needed.

## 2023-09-12 NOTE — TELEPHONE ENCOUNTER
Please schedule f/u appt in next few wks with 1st year fellow and myself. Former pt ot Dr. Wilson. Thank you SARINA

## 2023-09-13 ENCOUNTER — TELEPHONE (OUTPATIENT)
Dept: RHEUMATOLOGY | Facility: CLINIC | Age: 73
End: 2023-09-13
Payer: MEDICARE

## 2023-09-14 ENCOUNTER — DOCUMENTATION ONLY (OUTPATIENT)
Dept: TRANSPLANT | Facility: CLINIC | Age: 73
End: 2023-09-14
Payer: MEDICARE

## 2023-09-18 ENCOUNTER — OFFICE VISIT (OUTPATIENT)
Dept: TRANSPLANT | Facility: CLINIC | Age: 73
End: 2023-09-18
Payer: MEDICARE

## 2023-09-18 ENCOUNTER — HOSPITAL ENCOUNTER (OUTPATIENT)
Dept: PULMONOLOGY | Facility: CLINIC | Age: 73
Discharge: HOME | End: 2023-09-18
Payer: MEDICARE

## 2023-09-18 VITALS
BODY MASS INDEX: 25.3 KG/M2 | BODY MASS INDEX: 24.98 KG/M2 | OXYGEN SATURATION: 100 % | DIASTOLIC BLOOD PRESSURE: 67 MMHG | TEMPERATURE: 97 F | WEIGHT: 149.94 LBS | WEIGHT: 151.88 LBS | RESPIRATION RATE: 20 BRPM | HEIGHT: 65 IN | HEART RATE: 85 BPM | HEIGHT: 65 IN | SYSTOLIC BLOOD PRESSURE: 150 MMHG

## 2023-09-18 VITALS
DIASTOLIC BLOOD PRESSURE: 73 MMHG | SYSTOLIC BLOOD PRESSURE: 166 MMHG | BODY MASS INDEX: 25.42 KG/M2 | HEART RATE: 69 BPM | HEIGHT: 65 IN | WEIGHT: 152.56 LBS

## 2023-09-18 DIAGNOSIS — I27.20 PULMONARY HYPERTENSION: ICD-10-CM

## 2023-09-18 DIAGNOSIS — J22 LRTI (LOWER RESPIRATORY TRACT INFECTION): ICD-10-CM

## 2023-09-18 DIAGNOSIS — M34.9 SCLERODERMA WITH PULMONARY INVOLVEMENT: ICD-10-CM

## 2023-09-18 DIAGNOSIS — M34.9 SCLERODERMA WITH PULMONARY INVOLVEMENT: Primary | ICD-10-CM

## 2023-09-18 DIAGNOSIS — J84.9 ILD (INTERSTITIAL LUNG DISEASE): ICD-10-CM

## 2023-09-18 DIAGNOSIS — I27.9 CHRONIC PULMONARY HEART DISEASE: ICD-10-CM

## 2023-09-18 DIAGNOSIS — Z79.899 IMMUNOSUPPRESSION DUE TO DRUG THERAPY: ICD-10-CM

## 2023-09-18 DIAGNOSIS — I27.29 PULMONARY HYPERTENSION ASSOCIATED WITH SYSTEMIC DISORDER: Primary | Chronic | ICD-10-CM

## 2023-09-18 DIAGNOSIS — D84.821 IMMUNOSUPPRESSION DUE TO DRUG THERAPY: ICD-10-CM

## 2023-09-18 DIAGNOSIS — K21.9 GASTROESOPHAGEAL REFLUX DISEASE, UNSPECIFIED WHETHER ESOPHAGITIS PRESENT: ICD-10-CM

## 2023-09-18 DIAGNOSIS — I10 ESSENTIAL HYPERTENSION: ICD-10-CM

## 2023-09-18 PROCEDURE — 94618 PULMONARY STRESS TESTING: CPT | Mod: PBBFAC,NTX | Performed by: INTERNAL MEDICINE

## 2023-09-18 PROCEDURE — 94010 BREATHING CAPACITY TEST: ICD-10-PCS | Mod: 26,59,S$PBB,NTX | Performed by: INTERNAL MEDICINE

## 2023-09-18 PROCEDURE — 94727 PR PULM FUNCTION TEST BY GAS: ICD-10-PCS | Mod: 26,S$PBB,NTX, | Performed by: INTERNAL MEDICINE

## 2023-09-18 PROCEDURE — 99999 PR PBB SHADOW E&M-EST. PATIENT-LVL IV: ICD-10-PCS | Mod: PBBFAC,TXP,,

## 2023-09-18 PROCEDURE — 99214 OFFICE O/P EST MOD 30 MIN: CPT | Mod: S$PBB,NTX,,

## 2023-09-18 PROCEDURE — 94727 GAS DIL/WSHOT DETER LNG VOL: CPT | Mod: 26,S$PBB,NTX, | Performed by: INTERNAL MEDICINE

## 2023-09-18 PROCEDURE — 94010 BREATHING CAPACITY TEST: CPT | Mod: PBBFAC,NTX | Performed by: INTERNAL MEDICINE

## 2023-09-18 PROCEDURE — 99999 PR PBB SHADOW E&M-EST. PATIENT-LVL IV: CPT | Mod: PBBFAC,TXP,,

## 2023-09-18 PROCEDURE — 87070 CULTURE OTHR SPECIMN AEROBIC: CPT | Mod: TXP | Performed by: PHYSICIAN ASSISTANT

## 2023-09-18 PROCEDURE — 99999 PR PBB SHADOW E&M-EST. PATIENT-LVL V: ICD-10-PCS | Mod: PBBFAC,TXP,, | Performed by: PHYSICIAN ASSISTANT

## 2023-09-18 PROCEDURE — 94729 DIFFUSING CAPACITY: CPT | Mod: PBBFAC,NTX | Performed by: INTERNAL MEDICINE

## 2023-09-18 PROCEDURE — 99214 PR OFFICE/OUTPT VISIT, EST, LEVL IV, 30-39 MIN: ICD-10-PCS | Mod: 25,S$PBB,NTX, | Performed by: PHYSICIAN ASSISTANT

## 2023-09-18 PROCEDURE — 94729 DIFFUSING CAPACITY: CPT | Mod: 26,S$PBB,NTX, | Performed by: INTERNAL MEDICINE

## 2023-09-18 PROCEDURE — 99215 OFFICE O/P EST HI 40 MIN: CPT | Mod: PBBFAC,NTX | Performed by: PHYSICIAN ASSISTANT

## 2023-09-18 PROCEDURE — 94010 BREATHING CAPACITY TEST: CPT | Mod: 26,59,S$PBB,NTX | Performed by: INTERNAL MEDICINE

## 2023-09-18 PROCEDURE — 94618 PULMONARY STRESS TESTING: CPT | Mod: 26,S$PBB,NTX, | Performed by: INTERNAL MEDICINE

## 2023-09-18 PROCEDURE — 94729 PR C02/MEMBANE DIFFUSE CAPACITY: ICD-10-PCS | Mod: 26,S$PBB,NTX, | Performed by: INTERNAL MEDICINE

## 2023-09-18 PROCEDURE — 94727 GAS DIL/WSHOT DETER LNG VOL: CPT | Mod: PBBFAC,NTX | Performed by: INTERNAL MEDICINE

## 2023-09-18 PROCEDURE — 99214 OFFICE O/P EST MOD 30 MIN: CPT | Mod: PBBFAC,27,TXP

## 2023-09-18 PROCEDURE — 99214 OFFICE O/P EST MOD 30 MIN: CPT | Mod: 25,S$PBB,NTX, | Performed by: PHYSICIAN ASSISTANT

## 2023-09-18 PROCEDURE — 94618 PULMONARY STRESS TESTING: ICD-10-PCS | Mod: 26,S$PBB,NTX, | Performed by: INTERNAL MEDICINE

## 2023-09-18 PROCEDURE — 87205 SMEAR GRAM STAIN: CPT | Mod: TXP | Performed by: PHYSICIAN ASSISTANT

## 2023-09-18 PROCEDURE — 99214 PR OFFICE/OUTPT VISIT, EST, LEVL IV, 30-39 MIN: ICD-10-PCS | Mod: S$PBB,NTX,,

## 2023-09-18 PROCEDURE — 99999 PR PBB SHADOW E&M-EST. PATIENT-LVL V: CPT | Mod: PBBFAC,TXP,, | Performed by: PHYSICIAN ASSISTANT

## 2023-09-18 RX ORDER — SPIRONOLACTONE 25 MG/1
25 TABLET ORAL DAILY
Qty: 30 TABLET | Refills: 11 | Status: SHIPPED | OUTPATIENT
Start: 2023-09-18 | End: 2023-10-24 | Stop reason: SINTOL

## 2023-09-18 RX ORDER — LEVOFLOXACIN 750 MG/1
750 TABLET ORAL DAILY
Qty: 7 TABLET | Refills: 0 | Status: SHIPPED | OUTPATIENT
Start: 2023-09-18 | End: 2023-10-23

## 2023-09-18 NOTE — PROGRESS NOTES
ADVANCED LUNG DISEASE CLINIC FOLLOW-UP                                                                                                                                             Reason for Visit:  SSc-ILD    Referring Physician: Roberta Leigh, *    History of Present Illness: Yamel Shah is a 72 y.o. female who is on 0L of oxygen.  She is on no assisted ventilation.  Her New York Heart Association Class is II and a Karnofsky score of 90% - Able to carry on normal activity: minor symptoms of disease. She is not diabetic.    She presents today for routine SSc-ILD follow-up. Patient states she recently went on a cruise in [place] and required a 2 day hospitalization for COVID and GI bleed. Completed a few days of paxlovid therapy, but discontinued due to side effects. Did not require oxygen during her hospitalization. Her MMF has been on hold since August 28 when she initially tested positive for COVID. She states she initially had symptomatic improvement, but reports increased cough, yellow sputum production and sore throat for the last week. She recently completed a course of levaquin in July. Continues to have reflux despite PPI therapy, but sleeps on an incline and is compliant with diet modification. Has PH and remains on Adcirca.     Past Medical History:   Diagnosis Date    Abnormal Pap smear     Acid reflux     Allergy     Arthritis     Encounter for blood transfusion     GERD (gastroesophageal reflux disease)     Hiatal hernia 03/24/2023    History of migraine headaches     Hx of colonic polyp     Hypertension     Idiopathic neuropathy 07/20/2012    ILD (interstitial lung disease) 11/06/2013    Iron deficiency anemia 03/18/2014    MRSA carrier     Osteopenia     Pneumonia     Pulmonary fibrosis     Pulmonary hypertension     Raynaud's disease     Scleroderma, diffuse     Sjogren's syndrome     Vitamin D deficiency 11/14/2013       Past Surgical History:   Procedure Laterality Date    24  HOUR IMPEDANCE PH MONITORING OF ESOPHAGUS IN PATIENT NOT TAKING ACID REDUCING MEDICATIONS N/A 03/04/2020    Procedure: IMPEDANCE PH STUDY, ESOPHAGEAL, 24 HOUR, IN PATIENT NOT TAKING ACID REDUCING MEDICATION;  Surgeon: Annamaria Mendoza MD;  Location: [facility] (4TH FLR);  Service: Endoscopy;  Laterality: N/A;  OFF PPI/H2 Blocker   Motility Studies   Hold Narcotics x 1 days   Hold TCA x 1 days  2/26 - LVM attempting to confirm appt  2/27 - Confirmed appt    ANORECTAL MANOMETRY N/A 05/10/2021    Procedure: MANOMETRY, ANORECTAL with balloon expulsion test;  Surgeon: Annamaria Mendoza MD;  Location: [facility] ENDO (4TH FLR);  Service: Endoscopy;  Laterality: N/A;  order combined  covid test 5/7 [facility], instructions emailed-[facility]    BREAST BIOPSY      Left, benign    CERVICAL CONIZATION   W/ LASER  1970    COLONOSCOPY      COLONOSCOPY N/A 03/29/2019    Procedure: COLONOSCOPY;  Surgeon: Annamaria Mendoza MD;  Location: [facility] (2ND FLR);  Service: Endoscopy;  Laterality: N/A;    COLONOSCOPY N/A 05/10/2021    Procedure: COLONOSCOPY;  Surgeon: Annamaria Mendoza MD;  Location: [facility] (4TH FLR);  Service: Endoscopy;  Laterality: N/A;  2nd floor-previous scopes done on 2nd floor, gastroparesis  full liquid diet x2 days, clear liquid x1 day prior to procedure  covid test 5/7 [facility], instructions emailed-vt  5/6 pt confirmed appt-[facility]    DILATION AND CURETTAGE OF UTERUS      ESOPHAGEAL MANOMETRY WITH MEASUREMENT OF IMPEDANCE N/A 03/04/2020    Procedure: MANOMETRY, ESOPHAGUS, WITH IMPEDANCE MEASUREMENT;  Surgeon: Annamaria Mendoza MD;  Location: [facility] (4TH FLR);  Service: Endoscopy;  Laterality: N/A;  OFF PPI/H2 Blocker   Motility Studies   Hold Narcotics x 1 days   Hold TCA x 1 days    ESOPHAGOGASTRODUODENOSCOPY      ESOPHAGOGASTRODUODENOSCOPY N/A 03/29/2019    Procedure: EGD (ESOPHAGOGASTRODUODENOSCOPY);  Surgeon: Annamaria Mendoza MD;  Location: [facility] ENDO (2ND FLR);  Service: Endoscopy;  Laterality: N/A;  pulmonary htn     ESOPHAGOGASTRODUODENOSCOPY N/A 02/02/2021    Procedure: ESOPHAGOGASTRODUODENOSCOPY (EGD);  Surgeon: Annamaria Mendoza MD;  [facility]  Service: Endoscopy;  Laterality: N/A;  2nd floor gastroparesis  3 days full liquid diet and 1 day clears  covid test 1/30 primary care, instructions sent to [facility]    ESOPHAGOGASTRODUODENOSCOPY N/A 07/01/2022    Procedure: ESOPHAGOGASTRODUODENOSCOPY (EGD);  Surgeon: Annamaria Mendoza MD;  [facility]  Service: Endoscopy;  Laterality: N/A;  2nd floor-gastroparesis  full liquid diet x3 days, clear liquid diet x1 day prior to procedure  fully vaccinated, instructions sent to [facility]  6/29-pt confirmed new arrival time-\[facility]\""    EXCISION, LESION, STOMACH, LAPAROSCOPIC N/A 03/23/2023    Procedure: XI ROBOTIC EXCISION, LESION, STOMACH;  Surgeon: Katherine Segura MD;  Location: [facility] OR [address] [place] [address];  Service: General;  Laterality: N/A;    EXCISIONAL BIOPSY, LYMPH NODE Right 05/26/2023    Procedure: EXCISIONAL BIOPSY, LYMPH NODE, INGUINAL;  Surgeon: Katherine Segura MD;  Location: [facility] OR [address] [place] [address];  Service: General;  Laterality: Right;    HYSTERECTOMY  1990    FRANDY (AUB, Fibroids), ovaries remain    REPAIR, HERNIA, UMBILICAL N/A 03/23/2023    Procedure: REPAIR, HERNIA, UMBILICAL;  Surgeon: Katherine Segura MD;  Location: [facility] OR [address] [place] [address];  Service: General;  Laterality: N/A;    RIGHT HEART CATHETERIZATION Right 01/05/2021    Procedure: INSERTION, CATHETER, RIGHT HEART;  Surgeon: Aureliano Sy MD;  Location: [facility] CATH LAB;  Service: Cardiology;  Laterality: Right;    RIGHT HEART CATHETERIZATION Right 03/16/2023    Procedure: INSERTION, CATHETER, RIGHT HEART;  Surgeon: Aureliano Sy MD;  Location: [facility] CATH LAB;  Service: Cardiology;  Laterality: Right;    ROBOT-ASSISTED LAPAROSCOPIC REPAIR OF INGUINAL HERNIA USING DA VERNELL XI Right 05/26/2023    Procedure: XI ROBOTIC REPAIR, HERNIA, INGUINAL, FEMORAL;   Surgeon: Katherine Segura MD;  Location: [facility] OR 2ND FLR;  Service: General;  Laterality: Right;    ROBOT-ASSISTED REPAIR OF HIATAL HERNIA USING DA VERNELL XI N/A 03/23/2023    Procedure: XI ROBOTIC REPAIR, HERNIA, HIATAL;  Surgeon: Katherine Segura MD;  Location: [facility] OR 2ND FLR;  Service: General;  Laterality: N/A;    VARICOSE VEIN SURGERY      XI ROBOTIC GASTROPEXY N/A 03/23/2023    Procedure: XI ROBOTIC GASTROPEXY;  Surgeon: Katherine Segura MD;  Location: [facility] OR 2ND FLR;  Service: General;  Laterality: N/A;    XI ROBOTIC PYLOROMYOTOMY N/A 03/23/2023    Procedure: XI ROBOTIC PYLOROMYOTOMY;  Surgeon: Katherine Segura MD;  Location: [facility] OR 2ND FLR;  Service: General;  Laterality: N/A;       Allergies: Sulfa (sulfonamide antibiotics) and Tramadol    Current Outpatient Medications   Medication Sig    albuterol (VENTOLIN HFA) 90 mcg/actuation inhaler Inhale 2 puffs into the lungs every 4 (four) hours as needed for Wheezing. Rescue    carboxymethylcellulose sodium (REFRESH TEARS) 0.5 % Drop Apply 1-2 drops to every as needed    doxycycline (VIBRA-TABS) 100 MG tablet Take 1 tablet (100 mg total) by mouth 2 (two) times daily. (Patient not taking: Reported on 8/17/2023)    ergocalciferol (ERGOCALCIFEROL) 50,000 unit Cap TAKE 1 CAPSULE BY MOUTH EVERY 7 DAYS    ferrous sulfate 325 (65 FE) MG EC tablet Take 325 mg by mouth every morning.    flu vac 2022 [id number],PF, (FLUAD QUAD 2022-23,65Y UP,,PF,) 60 mcg (15 mcg x 4)/0.5 mL Syrg Inject into the muscle. (Patient not taking: Reported on 8/17/2023)    multivitamin capsule Take 1 capsule by mouth once daily.    mycophenolate mofetil (CELLCEPT) 200 mg/mL SusR Take 5 mLs (1,000 mg total) by mouth 2 (two) times daily.    NIFEdipine (ADALAT CC) 90 MG TbSR TAKE 1 TABLET(90 MG) BY MOUTH EVERY DAY (Patient taking differently: Take by mouth nightly. TAKE 1 TABLET(90 MG) BY MOUTH EVERY DAY (TAKES NIGHTLY WITH 30 MG TABLET TOTAL 120 MG))     NIFEdipine (PROCARDIA-XL) 30 MG (OSM) 24 hr tablet TAKE 1 TABLET(30 MG) BY MOUTH EVERY DAY (Patient taking differently: Take by mouth nightly. TAKE WITH 90 MG TABLET EVERY NIGHT (TOTAL 120 MG))    pravastatin (PRAVACHOL) 20 MG tablet Take 1 tablet (20 mg total) by mouth every evening.    pregabalin (LYRICA) 300 MG Cap Take 1 capsule (300 mg total) by mouth 2 (two) times daily.    PREVIDENT 5000 BOOSTER PLUS 1.1 % Pste SMARTSIG:To Teeth    PREVIDENT 5000 SENSITIVE 1.1-5 % Pste BRUSH FOR 2 MINUTES TWICE PER DAY    RABEprazole (ACIPHEX) 20 mg tablet Take 1 tablet (20 mg total) by mouth 2 (two) times daily.    sars-cov-2, covid-19, (MODERNA COVID-19) 50 mcg/0.25 ml injection (BOOSTER) Inject into the muscle. (Patient not taking: Reported on 8/17/2023)    tadalafil (ADCIRCA) 20 mg Tab Take 2 tablets (40 mg total) by mouth once daily. (Patient taking differently: Take 40 mg by mouth every evening.)    tiZANidine (ZANAFLEX) 4 MG tablet Take 1 tablet (4 mg total) by mouth nightly as needed (muscle pain).     No current facility-administered medications for this visit.       Immunization History   Administered Date(s) Administered    COVID-19 MRNA, LN-S PF (MODERNA HALF 0.25 ML DOSE) 03/12/2021, 04/21/2022    COVID-19, MRNA, LN-S, PF (MODERNA FULL 0.5 ML DOSE) 02/09/2021, 09/21/2021    COVID-19, MRNA, LN-S, PF (Pfizer) (Purple Cap) 10/13/2022    COVID-19, mRNA, LNP-S, bivalent booster, PF (Moderna Omicron) 10/13/2022    Influenza 11/02/2015, 09/21/2021    Influenza (FLUAD) - Quadrivalent - Adjuvanted - PF *Preferred* (65+) 09/11/2020, 09/12/2022    Influenza - High Dose - PF (65 years and older) 10/09/2017, 10/27/2018, 09/09/2019    Influenza - Quadrivalent - PF *Preferred* (6 months and older) 11/03/2006, 10/08/2007, 09/29/2009, 09/26/2014, 11/02/2015, 09/27/2016    Influenza Split 11/03/2006, 10/08/2007, 09/29/2009, 09/26/2014    Pneumococcal Conjugate - 13 Valent 01/16/2013, 01/16/2013    Pneumococcal Polysaccharide - 23  Valent 09/27/2016, 11/12/2021    Tdap 02/06/2019    Zoster 01/16/2013    Zoster Recombinant 11/30/2018, 02/12/2019     Family History:    Family History   Problem Relation Age of Onset    Breast cancer Mother     Cancer Mother         Breast    Hypertension Father     Diabetes Father         Amputation of legs    Breast cancer Sister     Diabetes Sister     Arthritis Sister     Cancer Sister         Breast    Osteoarthritis Brother     Diabetes Brother     Arthritis Brother     Birth defects Brother         Polio at birth, recently had knee replacement surgery on left knee    No Known Problems Daughter     No Known Problems Daughter     No Known Problems Son     No Known Problems Son     Breast cancer Maternal Aunt     Cancer Maternal Aunt         Breast    Early death Sister     Melanoma Neg Hx     Colon cancer Neg Hx     Crohn's disease Neg Hx     Stomach cancer Neg Hx     Ulcerative colitis Neg Hx     Rectal cancer Neg Hx     Irritable bowel syndrome Neg Hx     Esophageal cancer Neg Hx     Celiac disease Neg Hx     Ovarian cancer Neg Hx     Liver cancer Neg Hx     Pancreatic cancer Neg Hx      Social History     Substance and Sexual Activity   Alcohol Use Never    Comment: wine occasionally      Social History     Substance and Sexual Activity   Drug Use No      Social History     Socioeconomic History    Marital status:      Spouse name: Lewis    Number of children: 4   Occupational History    Occupation: -retired     Employer: [employer]     Comment: [employer] [place] [employer]     Employer: RETIRED   Tobacco Use    Smoking status: Never     Passive exposure: Never    Smokeless tobacco: Never   Substance and Sexual Activity    Alcohol use: Never     Comment: wine occasionally    Drug use: No    Sexual activity: Yes     Partners: Male     Birth control/protection: Post-menopausal, None   Other Topics Concern    Are you pregnant or think you may be? No    Breast-feeding No   Social History Narrative          Social Determinants of Health     Financial Resource Strain: Low Risk  (8/12/2023)    Overall Financial Resource Strain (CARDIA)     Difficulty of Paying Living Expenses: Not very hard   Food Insecurity: No Food Insecurity (8/12/2023)    Hunger Vital Sign     Worried About Running Out of Food in the Last Year: Never true     Ran Out of Food in the Last Year: Never true   Transportation Needs: No Transportation Needs (8/12/2023)    PRAPARE - Transportation     Lack of Transportation (Medical): No     Lack of Transportation (Non-Medical): No   Physical Activity: Insufficiently Active (8/12/2023)    Exercise Vital Sign     Days of Exercise per Week: 2 days     Minutes of Exercise per Session: 30 min   Stress: No Stress Concern Present (8/12/2023)    [employer] - Occupational Stress Questionnaire     Feeling of Stress : Only a little   Social Connections: Socially Isolated (8/12/2023)    Social Connection and Isolation Panel [NHANES]     Frequency of Communication with Friends and Family: Twice a week     Frequency of Social Gatherings with Friends and Family: Never     Attends Islam Services: Never     Active Member of Clubs or Organizations: No     Attends Club or Organization Meetings: Never     Marital Status:    Housing Stability: Low Risk  (8/12/2023)    Housing Stability Vital Sign     Unable to Pay for Housing in the Last Year: No     Number of Places Lived in the Last Year: 1     Unstable Housing in the Last Year: No     Review of Systems   Constitutional:  Negative for chills, diaphoresis, fever, malaise/fatigue and weight loss.   HENT:  Negative for congestion, ear discharge, ear pain, hearing loss, nosebleeds, sinus pain, sore throat and tinnitus.    Eyes:  Negative for blurred vision, double vision, photophobia, pain, discharge and redness.   Respiratory:  Positive for cough, sputum production and shortness of breath (heavy exertion only). Negative for  "hemoptysis, wheezing and stridor.    Cardiovascular:  Negative for chest pain, palpitations, orthopnea, claudication, leg swelling and PND.   Gastrointestinal:  Positive for heartburn. Negative for abdominal pain, blood in stool, constipation, diarrhea, melena, nausea and vomiting.   Genitourinary:  Negative for dysuria, flank pain, frequency, hematuria and urgency.   Musculoskeletal:  Negative for back pain, falls, joint pain, myalgias and neck pain.   Skin:  Negative for itching and rash.   Neurological:  Negative for dizziness, tingling, tremors, sensory change, speech change, focal weakness, seizures, loss of consciousness, weakness and headaches.   Endo/Heme/Allergies:  Negative for environmental allergies and polydipsia. Does not bruise/bleed easily.   Psychiatric/Behavioral:  Negative for depression, hallucinations, memory loss, substance abuse and suicidal ideas. The patient is not nervous/anxious and does not have insomnia.      Vitals  BP (!) 150/67   Pulse 85   Temp 96.6 °F (35.9 °C) (Oral)   Resp 20   Ht 5' 5" (1.651 m)   Wt 68 kg (149 lb 14.6 oz)   SpO2 100%   BMI 24.95 kg/m²   Physical Exam  Vitals and nursing note reviewed.   Constitutional:       General: She is not in acute distress.     Appearance: She is well-developed. She is not diaphoretic.   HENT:      Head: Normocephalic and atraumatic.      Nose: Nose normal.      Mouth/Throat:      Pharynx: No oropharyngeal exudate.   Eyes:      General: No scleral icterus.     Conjunctiva/sclera: Conjunctivae normal.      Pupils: Pupils are equal, round, and reactive to light.   Neck:      Thyroid: No thyromegaly.      Vascular: No JVD.      Trachea: No tracheal deviation.   Cardiovascular:      Rate and Rhythm: Normal rate and regular rhythm.      Heart sounds: Normal heart sounds. No murmur heard.     No friction rub. No gallop.   Pulmonary:      Effort: Pulmonary effort is normal. No respiratory distress.      Breath sounds: Rales present. No " wheezing.      Comments: Diffuse expiratory squeaks  Abdominal:      General: Bowel sounds are normal. There is no distension.      Palpations: Abdomen is soft.      Tenderness: There is no abdominal tenderness.   Musculoskeletal:         General: No deformity. Normal range of motion.      Cervical back: Normal range of motion and neck supple.   Skin:     General: Skin is warm and dry.      Capillary Refill: Capillary refill takes less than 2 seconds.      Coloration: Skin is not pale.      Findings: No erythema or rash.      Comments: Skin tightening.  Digital ulcers and nailbed deformities.  Bilateral foot ulcers in dry intact dressing     Neurological:      Mental Status: She is alert and oriented to person, place, and time.      Cranial Nerves: No cranial nerve deficit.   Psychiatric:         Judgment: Judgment normal.         Labs:          9/18/2023    11:06 AM 8/17/2023     1:27 PM 5/23/2023     9:20 AM 10/7/2022    12:56 PM 1/3/2022    10:43 AM 5/21/2021    10:46 AM 9/11/2020    10:45 AM   Pulmonary Function Tests   FVC 1.76 liters 1.79 liters 1.9 liters 1.82 liters 1.83 liters 1.85 liters 1.99 liters   FEV1 1.46 liters 1.42 liters 1.58 liters 1.49 liters 1.44 liters 1.48 liters 1.57 liters   TLC (liters) 2.7 liters 3.11 liters 3.41 liters 3.2 liters  3.18 liters 2.23 liters   DLCO (ml/mmHg sec) 9.15 ml/mmHg sec 10.8 ml/mmHg sec 11.37 ml/mmHg sec 11.89 ml/mmHg sec  14.11 ml/mmHg sec 13.84 ml/mmHg sec   FVC% 66.1 61.8 71.1 67 67 68 72   FEV1% 70.5 63.8 75.6 71 68 69 73   FEF 25-75 1.64 1.35 1.82 1.7  1.57 1.55   FEF 25-75% 93.5 73.2 103.5 95  85 84   TLC% 52.8 60.9 66.8 63  62 63   RV 0.94 1.32 1.46 1.33  1.24 1.15   RV% 44.2 62.2 68.5 63  59 55   DLCO% 42.1 49.7 52.3 54  64 62         9/18/2023    10:57 AM 8/17/2023     1:29 PM 5/22/2023     9:20 AM 2/28/2023    12:42 PM 10/7/2022     9:33 AM 5/3/2022     8:40 AM 7/6/2021    10:50 AM   6MW   6MWT Status completed without stopping completed without stopping  completed without stopping completed without stopping completed without stopping completed without stopping completed without stopping   Patient Reported Dyspnea Dyspnea No complaints Other (Comment) Dyspnea;Lightheadedness Dyspnea;Leg pain Dyspnea   Was O2 used? No No No No No No No   6MW Distance walked (feet) 1450 feet 1300 feet 1500 feet 1500 feet 1400 feet 1400 feet 1400 feet   Distance walked (meters) 441.96 meters 396.24 meters 457.2 meters 457.2 meters 426.72 meters 426.72 meters 426.72 meters   Did patient stop? No No No No No No No   Oxygen Saturation 100 % 98 % 100 % 98 % 98 % 99 % 98 %   Supplemental Oxygen Room Air Room Air Room Air Room Air Room Air Room Air Room Air   Heart Rate 75 bpm 70 bpm 62 bpm 74 bpm 85 bpm 71 bpm 66 bpm   Blood Pressure 152/67 141/63 109/51 133/63 126/61 115/54 119/58   Ju Dyspnea Rating  light light moderate moderate moderate moderate moderate   Oxygen Saturation 98 % 96 % 98 % 94 % 97 % 96 % 97 %   Supplemental Oxygen Room Air Room Air Room Air Room Air Room Air Room Air Room Air   Heart Rate 102 bpm 98 bpm 90 bpm 113 bpm 108 bpm 88 bpm 97 bpm   Blood Pressure 168/71 159/70 136/62 169/76 138/63 138/63 135/63   Ju Dyspnea Rating  moderate somewhat heavy somewhat heavy somewhat heavy somewhat heavy somewhat heavy somewhat heavy   Recovery Time (seconds) 123 seconds 100 seconds 131 seconds 160 seconds 39 seconds 130 seconds 145 seconds   Oxygen Saturation 100 % 100 % 100 % 97 % 97 % 98 % 99 %   Supplemental Oxygen Room Air Room Air Room Air Room Air Room Air Room Air Room Air   Heart Rate 90 bpm 82 bpm 71 bpm 84 bpm 103 bpm 83 bpm 72 bpm       Imaging:  Results for orders placed during the hospital encounter of 12/22/20    CT Chest Without Contrast    Narrative  EXAMINATION:  CT CHEST WITHOUT CONTRAST    CLINICAL HISTORY:  pulmonary hypertension; Pulmonary hypertension, unspecified    TECHNIQUE:  Using low dose technique the chest was surveyed from above the pulmonary apices  through the posterior costophrenic angles.  Data was reconstructed for multiplanar images in axial, sagittal and coronal planes and for maximal intensity projection images in the axial plane.    All CT scans at this facility use dose modulation, iterative reconstruction and/or weight based dosing when appropriate to reduce radiation dose to as low as reasonably achievable.    Xray dose: DLP = 152.03 mGy-cm.    COMPARISON:  CT chest abdomen without contrast 03/13/2019.  CT chest without contrast 09/18/2015.    FINDINGS:  Base of Neck: No significant abnormality.    Aorta: Left-sided aortic arch with 3 arterial branches. The aorta maintains normal caliber, contour and course. There is mild calcification of the thoracic aorta or coronary arteries.    Heart/pericardium: Normal size.  No pericardial effusion or calcification.  Calcification of the aortic valve annulus.    Pulmonary vasculature: Pulmonary arteries distribute normally.  Pulmonary artery size is neither specific nor sensitive for normal or elevated pulmonary arterial pressures.    -There are four pulmonary veins.    Barbara/Mediastinum: No pathologic noelle enlargement.    Esophagus: The esophagus is mildly dilated with dependent fluid, similar to prior exam.    Upper Abdomen: No abnormality of the partially imaged upper abdomen.    Thoracic soft tissues: Normal. Both breasts are present.    Bones: No acute fracture. No suspicious lytic or sclerotic lesion.    Airways: Patent.    Lungs/pleura:    -Stable biapical scarring.    -Redemonstration of cystic changes and reticulation plus subsegmental ground-glass disease, most extensive in the lower lung zones.  Radiographic appearance is more typical for NSIP but could represent UIP in this patient with scleroderma.    -There are stable scattered foci of tree-in-bud nodularities, for example within the superior segment of the right lower lobe (axial series 4, image 211).  There is associated dilatation of multiple  adjacent small airways.    -0.4 cm solid pulmonary nodule in the apical segment of the right upper lobe (axial series 4, image 83), stable dating back to 2015.    -No pleural fluid or thickening.No pleural calcification. No pneumothorax.    Impression  1.  Overall stable appearing chronic interstitial lung disease consistent with patient's history of scleroderma.  Radiographic appearance is more typical for NSIP and UIP, noting that UIP is a more common disease.    2.  Previously characterized tree-in-bud opacity along the superior aspect of the right oblique fissure appears stable and may reflect chronic inflammatory process.    3.  0.4 cm solid right upper lobe pulmonary nodule, stable dating back to 09/18/2015.  Such stability favors benign etiology.    4.  Persistent fluid-filled esophagus as noted on multiple priors placing the patient at increased risk for aspiration.  This may be related to the patient's history of scleroderma; clinical considerations will determine if further assessment of esophageal motility is warranted.    Electronically signed by resident: Leobardo Arellano  Date:    12/22/2020  Time:    11:43    Electronically signed by: Maxine Del Cid MD  Date:    12/22/2020  Time:    14:23      Cardiodiagnostics:  Results for orders placed during the hospital encounter of 12/29/21    Echo    Interpretation Summary  · The left ventricle is normal in size with normal systolic function.  · The estimated ejection fraction is 63%.  · Normal left ventricular diastolic function.  · Normal right ventricular size with normal right ventricular systolic function.  · Mild pulmonic regurgitation.  · Normal central venous pressure (3 mmHg).  · The estimated PA systolic pressure is 32 mmHg.  · Trivial posterior pericardial effusion.    Results for orders placed during the hospital encounter of 08/17/23    Echo    Interpretation Summary    Left Ventricle: The left ventricle is normal in size. Normal wall thickness.  Normal wall motion. There is normal systolic function with a visually estimated ejection fraction of 60 - 65%. There is normal diastolic function.    Right Ventricle: Normal right ventricular cavity size. Wall thickness is normal. Right ventricle wall motion  is normal. Systolic function is normal.    Pulmonary Artery: The estimated pulmonary artery systolic pressure is 40 mmHg.      Assessment:  1. Scleroderma with pulmonary involvement    2. Gastroesophageal reflux disease, unspecified whether esophagitis present    3. Pulmonary hypertension    4. LRTI (lower respiratory tract infection)      Plan:     Followed for SSc-ILD with PAH. MMF suspension has been on hold since August 28. Instructed patient to resume. No desaturations on 6MWT and distance unchanged. Has known PH which is stable on Adcirca. Has had decline in her FVC/DLCO which could be reflective of acute infection. Will defer OFEV therapy and reassess in 4 weeks.     Continue to follow with GI for GERD/scleroderma esophagus. Discussed elevation of the head of her bed to help with nocturnal symptoms. Continue with PPI.    Continue Adcirca and PH follow-up.     Checking sputum culture and will give course of levaquin 750 mg daily x 7 days.     RTC in 4 weeks with repeat 6MWT and spirometry.       Claire Pierre PA-C  [facility] Transplant

## 2023-09-18 NOTE — H&P (VIEW-ONLY)
ADVANCED LUNG DISEASE CLINIC FOLLOW-UP                                                                                                                                             Reason for Visit:  SSc-ILD    Referring Physician: Roberta Leigh, *    History of Present Illness: Yamel Shah is a 72 y.o. female who is on 0L of oxygen.  She is on no assisted ventilation.  Her New York Heart Association Class is II and a Karnofsky score of 90% - Able to carry on normal activity: minor symptoms of disease. She is not diabetic.    She presents today for routine SSc-ILD follow-up. Patient states she recently went on a cruise in [place] and required a 2 day hospitalization for COVID and GI bleed. Completed a few days of paxlovid therapy, but discontinued due to side effects. Did not require oxygen during her hospitalization. Her MMF has been on hold since August 28 when she initially tested positive for COVID. She states she initially had symptomatic improvement, but reports increased cough, yellow sputum production and sore throat for the last week. She recently completed a course of levaquin in July. Continues to have reflux despite PPI therapy, but sleeps on an incline and is compliant with diet modification. Has PH and remains on Adcirca.     Past Medical History:   Diagnosis Date    Abnormal Pap smear     Acid reflux     Allergy     Arthritis     Encounter for blood transfusion     GERD (gastroesophageal reflux disease)     Hiatal hernia 03/24/2023    History of migraine headaches     Hx of colonic polyp     Hypertension     Idiopathic neuropathy 07/20/2012    ILD (interstitial lung disease) 11/06/2013    Iron deficiency anemia 03/18/2014    MRSA carrier     Osteopenia     Pneumonia     Pulmonary fibrosis     Pulmonary hypertension     Raynaud's disease     Scleroderma, diffuse     Sjogren's syndrome     Vitamin D deficiency 11/14/2013       Past Surgical History:   Procedure Laterality Date    24  HOUR IMPEDANCE PH MONITORING OF ESOPHAGUS IN PATIENT NOT TAKING ACID REDUCING MEDICATIONS N/A 03/04/2020    Procedure: IMPEDANCE PH STUDY, ESOPHAGEAL, 24 HOUR, IN PATIENT NOT TAKING ACID REDUCING MEDICATION;  Surgeon: Annamaria Mendoza MD;  Location: [facility] (4TH FLR);  Service: Endoscopy;  Laterality: N/A;  OFF PPI/H2 Blocker   Motility Studies   Hold Narcotics x 1 days   Hold TCA x 1 days  2/26 - LVM attempting to confirm appt  2/27 - Confirmed appt    ANORECTAL MANOMETRY N/A 05/10/2021    Procedure: MANOMETRY, ANORECTAL with balloon expulsion test;  Surgeon: Annamaria Mendoza MD;  Location: [facility] ENDO (4TH FLR);  Service: Endoscopy;  Laterality: N/A;  order combined  covid test 5/7 Mu-ism, instructions emailed-[facility]    BREAST BIOPSY      Left, benign    CERVICAL CONIZATION   W/ LASER  1970    COLONOSCOPY      COLONOSCOPY N/A 03/29/2019    Procedure: COLONOSCOPY;  Surgeon: Annamaria Mendoza MD;  Location: [facility] (2ND FLR);  Service: Endoscopy;  Laterality: N/A;    COLONOSCOPY N/A 05/10/2021    Procedure: COLONOSCOPY;  Surgeon: Annamaria Mendoza MD;  Location: [facility] (4TH FLR);  Service: Endoscopy;  Laterality: N/A;  2nd floor-previous scopes done on 2nd floor, gastroparesis  full liquid diet x2 days, clear liquid x1 day prior to procedure  covid test 5/7 Mu-ism, instructions emailed-vt  5/6 pt confirmed appt-[facility]    DILATION AND CURETTAGE OF UTERUS      ESOPHAGEAL MANOMETRY WITH MEASUREMENT OF IMPEDANCE N/A 03/04/2020    Procedure: MANOMETRY, ESOPHAGUS, WITH IMPEDANCE MEASUREMENT;  Surgeon: Annamaria Mendoza MD;  Location: [facility] (4TH FLR);  Service: Endoscopy;  Laterality: N/A;  OFF PPI/H2 Blocker   Motility Studies   Hold Narcotics x 1 days   Hold TCA x 1 days    ESOPHAGOGASTRODUODENOSCOPY      ESOPHAGOGASTRODUODENOSCOPY N/A 03/29/2019    Procedure: EGD (ESOPHAGOGASTRODUODENOSCOPY);  Surgeon: Annamaria Mendoza MD;  Location: [facility] ENDO (2ND FLR);  Service: Endoscopy;  Laterality: N/A;  pulmonary htn     ESOPHAGOGASTRODUODENOSCOPY N/A 02/02/2021    Procedure: ESOPHAGOGASTRODUODENOSCOPY (EGD);  Surgeon: Annamaria Mendoza MD;  [facility]  Service: Endoscopy;  Laterality: N/A;  2nd floor gastroparesis  3 days full liquid diet and 1 day clears  covid test 1/30 primary care, instructions sent to [facility]    ESOPHAGOGASTRODUODENOSCOPY N/A 07/01/2022    Procedure: ESOPHAGOGASTRODUODENOSCOPY (EGD);  Surgeon: Annamaria Mendoza MD;  [facility]  Service: Endoscopy;  Laterality: N/A;  2nd floor-gastroparesis  full liquid diet x3 days, clear liquid diet x1 day prior to procedure  fully vaccinated, instructions sent to [facility]  6/29-pt confirmed new arrival time-\[facility]\""    EXCISION, LESION, STOMACH, LAPAROSCOPIC N/A 03/23/2023    Procedure: XI ROBOTIC EXCISION, LESION, STOMACH;  Surgeon: Katherine Segura MD;  Location: [facility] OR [address] [place] [address];  Service: General;  Laterality: N/A;    EXCISIONAL BIOPSY, LYMPH NODE Right 05/26/2023    Procedure: EXCISIONAL BIOPSY, LYMPH NODE, INGUINAL;  Surgeon: Katherine Segura MD;  Location: [facility] OR [address] [place] [address];  Service: General;  Laterality: Right;    HYSTERECTOMY  1990    FRANDY (AUB, Fibroids), ovaries remain    REPAIR, HERNIA, UMBILICAL N/A 03/23/2023    Procedure: REPAIR, HERNIA, UMBILICAL;  Surgeon: Katherine Segura MD;  Location: [facility] OR [address] [place] [address];  Service: General;  Laterality: N/A;    RIGHT HEART CATHETERIZATION Right 01/05/2021    Procedure: INSERTION, CATHETER, RIGHT HEART;  Surgeon: Aureliano Sy MD;  Location: [facility] CATH LAB;  Service: Cardiology;  Laterality: Right;    RIGHT HEART CATHETERIZATION Right 03/16/2023    Procedure: INSERTION, CATHETER, RIGHT HEART;  Surgeon: Aureliano Sy MD;  Location: [facility] CATH LAB;  Service: Cardiology;  Laterality: Right;    ROBOT-ASSISTED LAPAROSCOPIC REPAIR OF INGUINAL HERNIA USING DA VERNELL XI Right 05/26/2023    Procedure: XI ROBOTIC REPAIR, HERNIA, INGUINAL, FEMORAL;   Surgeon: Katherine Segura MD;  Location: [facility] OR 2ND FLR;  Service: General;  Laterality: Right;    ROBOT-ASSISTED REPAIR OF HIATAL HERNIA USING DA VERNELL XI N/A 03/23/2023    Procedure: XI ROBOTIC REPAIR, HERNIA, HIATAL;  Surgeon: Katherine Segura MD;  Location: [facility] OR 2ND FLR;  Service: General;  Laterality: N/A;    VARICOSE VEIN SURGERY      XI ROBOTIC GASTROPEXY N/A 03/23/2023    Procedure: XI ROBOTIC GASTROPEXY;  Surgeon: Katherine Segura MD;  Location: [facility] OR 2ND FLR;  Service: General;  Laterality: N/A;    XI ROBOTIC PYLOROMYOTOMY N/A 03/23/2023    Procedure: XI ROBOTIC PYLOROMYOTOMY;  Surgeon: Katherine Segura MD;  Location: [facility] OR 2ND FLR;  Service: General;  Laterality: N/A;       Allergies: Sulfa (sulfonamide antibiotics) and Tramadol    Current Outpatient Medications   Medication Sig    albuterol (VENTOLIN HFA) 90 mcg/actuation inhaler Inhale 2 puffs into the lungs every 4 (four) hours as needed for Wheezing. Rescue    carboxymethylcellulose sodium (REFRESH TEARS) 0.5 % Drop Apply 1-2 drops to every as needed    doxycycline (VIBRA-TABS) 100 MG tablet Take 1 tablet (100 mg total) by mouth 2 (two) times daily. (Patient not taking: Reported on 8/17/2023)    ergocalciferol (ERGOCALCIFEROL) 50,000 unit Cap TAKE 1 CAPSULE BY MOUTH EVERY 7 DAYS    ferrous sulfate 325 (65 FE) MG EC tablet Take 325 mg by mouth every morning.    flu vac 2022 [id number]-[id number],PF, (FLUAD QUAD 2022-23,65Y UP,,PF,) 60 mcg (15 mcg x 4)/0.5 mL Syrg Inject into the muscle. (Patient not taking: Reported on 8/17/2023)    multivitamin capsule Take 1 capsule by mouth once daily.    mycophenolate mofetil (CELLCEPT) 200 mg/mL SusR Take 5 mLs (1,000 mg total) by mouth 2 (two) times daily.    NIFEdipine (ADALAT CC) 90 MG TbSR TAKE 1 TABLET(90 MG) BY MOUTH EVERY DAY (Patient taking differently: Take by mouth nightly. TAKE 1 TABLET(90 MG) BY MOUTH EVERY DAY (TAKES NIGHTLY WITH 30 MG TABLET TOTAL 120 MG))     NIFEdipine (PROCARDIA-XL) 30 MG (OSM) 24 hr tablet TAKE 1 TABLET(30 MG) BY MOUTH EVERY DAY (Patient taking differently: Take by mouth nightly. TAKE WITH 90 MG TABLET EVERY NIGHT (TOTAL 120 MG))    pravastatin (PRAVACHOL) 20 MG tablet Take 1 tablet (20 mg total) by mouth every evening.    pregabalin (LYRICA) 300 MG Cap Take 1 capsule (300 mg total) by mouth 2 (two) times daily.    PREVIDENT 5000 BOOSTER PLUS 1.1 % Pste SMARTSIG:To Teeth    PREVIDENT 5000 SENSITIVE 1.1-5 % Pste BRUSH FOR 2 MINUTES TWICE PER DAY    RABEprazole (ACIPHEX) 20 mg tablet Take 1 tablet (20 mg total) by mouth 2 (two) times daily.    sars-cov-2, covid-19, (MODERNA COVID-19) 50 mcg/0.25 ml injection (BOOSTER) Inject into the muscle. (Patient not taking: Reported on 8/17/2023)    tadalafil (ADCIRCA) 20 mg Tab Take 2 tablets (40 mg total) by mouth once daily. (Patient taking differently: Take 40 mg by mouth every evening.)    tiZANidine (ZANAFLEX) 4 MG tablet Take 1 tablet (4 mg total) by mouth nightly as needed (muscle pain).     No current facility-administered medications for this visit.       Immunization History   Administered Date(s) Administered    COVID-19 MRNA, LN-S PF (MODERNA HALF 0.25 ML DOSE) 03/12/2021, 04/21/2022    COVID-19, MRNA, LN-S, PF (MODERNA FULL 0.5 ML DOSE) 02/09/2021, 09/21/2021    COVID-19, MRNA, LN-S, PF (Pfizer) (Purple Cap) 10/13/2022    COVID-19, mRNA, LNP-S, bivalent booster, PF (Moderna Omicron) 10/13/2022    Influenza 11/02/2015, 09/21/2021    Influenza (FLUAD) - Quadrivalent - Adjuvanted - PF *Preferred* (65+) 09/11/2020, 09/12/2022    Influenza - High Dose - PF (65 years and older) 10/09/2017, 10/27/2018, 09/09/2019    Influenza - Quadrivalent - PF *Preferred* (6 months and older) 11/03/2006, 10/08/2007, 09/29/2009, 09/26/2014, 11/02/2015, 09/27/2016    Influenza Split 11/03/2006, 10/08/2007, 09/29/2009, 09/26/2014    Pneumococcal Conjugate - 13 Valent 01/16/2013, 01/16/2013    Pneumococcal Polysaccharide - 23  Valent 09/27/2016, 11/12/2021    Tdap 02/06/2019    Zoster 01/16/2013    Zoster Recombinant 11/30/2018, 02/12/2019     Family History:    Family History   Problem Relation Age of Onset    Breast cancer Mother     Cancer Mother         Breast    Hypertension Father     Diabetes Father         Amputation of legs    Breast cancer Sister     Diabetes Sister     Arthritis Sister     Cancer Sister         Breast    Osteoarthritis Brother     Diabetes Brother     Arthritis Brother     Birth defects Brother         Polio at birth, recently had knee replacement surgery on left knee    No Known Problems Daughter     No Known Problems Daughter     No Known Problems Son     No Known Problems Son     Breast cancer Maternal Aunt     Cancer Maternal Aunt         Breast    Early death Sister     Melanoma Neg Hx     Colon cancer Neg Hx     Crohn's disease Neg Hx     Stomach cancer Neg Hx     Ulcerative colitis Neg Hx     Rectal cancer Neg Hx     Irritable bowel syndrome Neg Hx     Esophageal cancer Neg Hx     Celiac disease Neg Hx     Ovarian cancer Neg Hx     Liver cancer Neg Hx     Pancreatic cancer Neg Hx      Social History     Substance and Sexual Activity   Alcohol Use Never    Comment: wine occasionally      Social History     Substance and Sexual Activity   Drug Use No      Social History     Socioeconomic History    Marital status:      Spouse name: Lewis    Number of children: 4   Occupational History    Occupation: -retired     Employer: [employer]     Comment: [employer]     Employer: RETIRED   Tobacco Use    Smoking status: Never     Passive exposure: Never    Smokeless tobacco: Never   Substance and Sexual Activity    Alcohol use: Never     Comment: wine occasionally    Drug use: No    Sexual activity: Yes     Partners: Male     Birth control/protection: Post-menopausal, None   Other Topics Concern    Are you pregnant or think you may be? No    Breast-feeding No   Social History Narrative          Social Determinants of Health     Financial Resource Strain: Low Risk  (8/12/2023)    Overall Financial Resource Strain (CARDIA)     Difficulty of Paying Living Expenses: Not very hard   Food Insecurity: No Food Insecurity (8/12/2023)    Hunger Vital Sign     Worried About Running Out of Food in the Last Year: Never true     Ran Out of Food in the Last Year: Never true   Transportation Needs: No Transportation Needs (8/12/2023)    PRAPARE - Transportation     Lack of Transportation (Medical): No     Lack of Transportation (Non-Medical): No   Physical Activity: Insufficiently Active (8/12/2023)    Exercise Vital Sign     Days of Exercise per Week: 2 days     Minutes of Exercise per Session: 30 min   Stress: No Stress Concern Present (8/12/2023)    [place] [employer] - Occupational Stress Questionnaire     Feeling of Stress : Only a little   Social Connections: Socially Isolated (8/12/2023)    Social Connection and Isolation Panel [NHANES]     Frequency of Communication with Friends and Family: Twice a week     Frequency of Social Gatherings with Friends and Family: Never     Attends Quaker Services: Never     Active Member of Clubs or Organizations: No     Attends Club or Organization Meetings: Never     Marital Status:    Housing Stability: Low Risk  (8/12/2023)    Housing Stability Vital Sign     Unable to Pay for Housing in the Last Year: No     Number of Places Lived in the Last Year: 1     Unstable Housing in the Last Year: No     Review of Systems   Constitutional:  Negative for chills, diaphoresis, fever, malaise/fatigue and weight loss.   HENT:  Negative for congestion, ear discharge, ear pain, hearing loss, nosebleeds, sinus pain, sore throat and tinnitus.    Eyes:  Negative for blurred vision, double vision, photophobia, pain, discharge and redness.   Respiratory:  Positive for cough, sputum production and shortness of breath (heavy exertion only). Negative for  "hemoptysis, wheezing and stridor.    Cardiovascular:  Negative for chest pain, palpitations, orthopnea, claudication, leg swelling and PND.   Gastrointestinal:  Positive for heartburn. Negative for abdominal pain, blood in stool, constipation, diarrhea, melena, nausea and vomiting.   Genitourinary:  Negative for dysuria, flank pain, frequency, hematuria and urgency.   Musculoskeletal:  Negative for back pain, falls, joint pain, myalgias and neck pain.   Skin:  Negative for itching and rash.   Neurological:  Negative for dizziness, tingling, tremors, sensory change, speech change, focal weakness, seizures, loss of consciousness, weakness and headaches.   Endo/Heme/Allergies:  Negative for environmental allergies and polydipsia. Does not bruise/bleed easily.   Psychiatric/Behavioral:  Negative for depression, hallucinations, memory loss, substance abuse and suicidal ideas. The patient is not nervous/anxious and does not have insomnia.      Vitals  BP (!) 150/67   Pulse 85   Temp 96.6 °F (35.9 °C) (Oral)   Resp 20   Ht 5' 5" (1.651 m)   Wt 68 kg (149 lb 14.6 oz)   SpO2 100%   BMI 24.95 kg/m²   Physical Exam  Vitals and nursing note reviewed.   Constitutional:       General: She is not in acute distress.     Appearance: She is well-developed. She is not diaphoretic.   HENT:      Head: Normocephalic and atraumatic.      Nose: Nose normal.      Mouth/Throat:      Pharynx: No oropharyngeal exudate.   Eyes:      General: No scleral icterus.     Conjunctiva/sclera: Conjunctivae normal.      Pupils: Pupils are equal, round, and reactive to light.   Neck:      Thyroid: No thyromegaly.      Vascular: No JVD.      Trachea: No tracheal deviation.   Cardiovascular:      Rate and Rhythm: Normal rate and regular rhythm.      Heart sounds: Normal heart sounds. No murmur heard.     No friction rub. No gallop.   Pulmonary:      Effort: Pulmonary effort is normal. No respiratory distress.      Breath sounds: Rales present. No " wheezing.      Comments: Diffuse expiratory squeaks  Abdominal:      General: Bowel sounds are normal. There is no distension.      Palpations: Abdomen is soft.      Tenderness: There is no abdominal tenderness.   Musculoskeletal:         General: No deformity. Normal range of motion.      Cervical back: Normal range of motion and neck supple.   Skin:     General: Skin is warm and dry.      Capillary Refill: Capillary refill takes less than 2 seconds.      Coloration: Skin is not pale.      Findings: No erythema or rash.      Comments: Skin tightening.  Digital ulcers and nailbed deformities.  Bilateral foot ulcers in dry intact dressing     Neurological:      Mental Status: She is alert and oriented to person, place, and time.      Cranial Nerves: No cranial nerve deficit.   Psychiatric:         Judgment: Judgment normal.         Labs:          9/18/2023    11:06 AM 8/17/2023     1:27 PM 5/23/2023     9:20 AM 10/7/2022    12:56 PM 1/3/2022    10:43 AM 5/21/2021    10:46 AM 9/11/2020    10:45 AM   Pulmonary Function Tests   FVC 1.76 liters 1.79 liters 1.9 liters 1.82 liters 1.83 liters 1.85 liters 1.99 liters   FEV1 1.46 liters 1.42 liters 1.58 liters 1.49 liters 1.44 liters 1.48 liters 1.57 liters   TLC (liters) 2.7 liters 3.11 liters 3.41 liters 3.2 liters  3.18 liters 2.23 liters   DLCO (ml/mmHg sec) 9.15 ml/mmHg sec 10.8 ml/mmHg sec 11.37 ml/mmHg sec 11.89 ml/mmHg sec  14.11 ml/mmHg sec 13.84 ml/mmHg sec   FVC% 66.1 61.8 71.1 67 67 68 72   FEV1% 70.5 63.8 75.6 71 68 69 73   FEF 25-75 1.64 1.35 1.82 1.7  1.57 1.55   FEF 25-75% 93.5 73.2 103.5 95  85 84   TLC% 52.8 60.9 66.8 63  62 63   RV 0.94 1.32 1.46 1.33  1.24 1.15   RV% 44.2 62.2 68.5 63  59 55   DLCO% 42.1 49.7 52.3 54  64 62         9/18/2023    10:57 AM 8/17/2023     1:29 PM 5/22/2023     9:20 AM 2/28/2023    12:42 PM 10/7/2022     9:33 AM 5/3/2022     8:40 AM 7/6/2021    10:50 AM   6MW   6MWT Status completed without stopping completed without stopping  completed without stopping completed without stopping completed without stopping completed without stopping completed without stopping   Patient Reported Dyspnea Dyspnea No complaints Other (Comment) Dyspnea;Lightheadedness Dyspnea;Leg pain Dyspnea   Was O2 used? No No No No No No No   6MW Distance walked (feet) 1450 feet 1300 feet 1500 feet 1500 feet 1400 feet 1400 feet 1400 feet   Distance walked (meters) 441.96 meters 396.24 meters 457.2 meters 457.2 meters 426.72 meters 426.72 meters 426.72 meters   Did patient stop? No No No No No No No   Oxygen Saturation 100 % 98 % 100 % 98 % 98 % 99 % 98 %   Supplemental Oxygen Room Air Room Air Room Air Room Air Room Air Room Air Room Air   Heart Rate 75 bpm 70 bpm 62 bpm 74 bpm 85 bpm 71 bpm 66 bpm   Blood Pressure 152/67 141/63 109/51 133/63 126/61 115/54 119/58   Ju Dyspnea Rating  light light moderate moderate moderate moderate moderate   Oxygen Saturation 98 % 96 % 98 % 94 % 97 % 96 % 97 %   Supplemental Oxygen Room Air Room Air Room Air Room Air Room Air Room Air Room Air   Heart Rate 102 bpm 98 bpm 90 bpm 113 bpm 108 bpm 88 bpm 97 bpm   Blood Pressure 168/71 159/70 136/62 169/76 138/63 138/63 135/63   Ju Dyspnea Rating  moderate somewhat heavy somewhat heavy somewhat heavy somewhat heavy somewhat heavy somewhat heavy   Recovery Time (seconds) 123 seconds 100 seconds 131 seconds 160 seconds 39 seconds 130 seconds 145 seconds   Oxygen Saturation 100 % 100 % 100 % 97 % 97 % 98 % 99 %   Supplemental Oxygen Room Air Room Air Room Air Room Air Room Air Room Air Room Air   Heart Rate 90 bpm 82 bpm 71 bpm 84 bpm 103 bpm 83 bpm 72 bpm       Imaging:  Results for orders placed during the hospital encounter of 12/22/20    CT Chest Without Contrast    Narrative  EXAMINATION:  CT CHEST WITHOUT CONTRAST    CLINICAL HISTORY:  pulmonary hypertension; Pulmonary hypertension, unspecified    TECHNIQUE:  Using low dose technique the chest was surveyed from above the pulmonary apices  through the posterior costophrenic angles.  Data was reconstructed for multiplanar images in axial, sagittal and coronal planes and for maximal intensity projection images in the axial plane.    All CT scans at this facility use dose modulation, iterative reconstruction and/or weight based dosing when appropriate to reduce radiation dose to as low as reasonably achievable.    Xray dose: DLP = 152.03 mGy-cm.    COMPARISON:  CT chest abdomen without contrast 03/13/2019.  CT chest without contrast 09/18/2015.    FINDINGS:  Base of Neck: No significant abnormality.    Aorta: Left-sided aortic arch with 3 arterial branches. The aorta maintains normal caliber, contour and course. There is mild calcification of the thoracic aorta or coronary arteries.    Heart/pericardium: Normal size.  No pericardial effusion or calcification.  Calcification of the aortic valve annulus.    Pulmonary vasculature: Pulmonary arteries distribute normally.  Pulmonary artery size is neither specific nor sensitive for normal or elevated pulmonary arterial pressures.    -There are four pulmonary veins.    Barbara/Mediastinum: No pathologic noelle enlargement.    Esophagus: The esophagus is mildly dilated with dependent fluid, similar to prior exam.    Upper Abdomen: No abnormality of the partially imaged upper abdomen.    Thoracic soft tissues: Normal. Both breasts are present.    Bones: No acute fracture. No suspicious lytic or sclerotic lesion.    Airways: Patent.    Lungs/pleura:    -Stable biapical scarring.    -Redemonstration of cystic changes and reticulation plus subsegmental ground-glass disease, most extensive in the lower lung zones.  Radiographic appearance is more typical for NSIP but could represent UIP in this patient with scleroderma.    -There are stable scattered foci of tree-in-bud nodularities, for example within the superior segment of the right lower lobe (axial series 4, image 211).  There is associated dilatation of multiple  adjacent small airways.    -0.4 cm solid pulmonary nodule in the apical segment of the right upper lobe (axial series 4, image 83), stable dating back to 2015.    -No pleural fluid or thickening.No pleural calcification. No pneumothorax.    Impression  1.  Overall stable appearing chronic interstitial lung disease consistent with patient's history of scleroderma.  Radiographic appearance is more typical for NSIP and UIP, noting that UIP is a more common disease.    2.  Previously characterized tree-in-bud opacity along the superior aspect of the right oblique fissure appears stable and may reflect chronic inflammatory process.    3.  0.4 cm solid right upper lobe pulmonary nodule, stable dating back to 09/18/2015.  Such stability favors benign etiology.    4.  Persistent fluid-filled esophagus as noted on multiple priors placing the patient at increased risk for aspiration.  This may be related to the patient's history of scleroderma; clinical considerations will determine if further assessment of esophageal motility is warranted.    Electronically signed by resident: Leobardo Arellano  Date:    12/22/2020  Time:    11:43    Electronically signed by: Maxine Del Cid MD  Date:    12/22/2020  Time:    14:23      Cardiodiagnostics:  Results for orders placed during the hospital encounter of 12/29/21    Echo    Interpretation Summary  · The left ventricle is normal in size with normal systolic function.  · The estimated ejection fraction is 63%.  · Normal left ventricular diastolic function.  · Normal right ventricular size with normal right ventricular systolic function.  · Mild pulmonic regurgitation.  · Normal central venous pressure (3 mmHg).  · The estimated PA systolic pressure is 32 mmHg.  · Trivial posterior pericardial effusion.    Results for orders placed during the hospital encounter of 08/17/23    Echo    Interpretation Summary    Left Ventricle: The left ventricle is normal in size. Normal wall thickness.  Normal wall motion. There is normal systolic function with a visually estimated ejection fraction of 60 - 65%. There is normal diastolic function.    Right Ventricle: Normal right ventricular cavity size. Wall thickness is normal. Right ventricle wall motion  is normal. Systolic function is normal.    Pulmonary Artery: The estimated pulmonary artery systolic pressure is 40 mmHg.      Assessment:  1. Scleroderma with pulmonary involvement    2. Gastroesophageal reflux disease, unspecified whether esophagitis present    3. Pulmonary hypertension    4. LRTI (lower respiratory tract infection)      Plan:     Followed for SSc-ILD with PAH. MMF suspension has been on hold since August 28. Instructed patient to resume. No desaturations on 6MWT and distance unchanged. Has known PH which is stable on Adcirca. Has had decline in her FVC/DLCO which could be reflective of acute infection. Will defer OFEV therapy and reassess in 4 weeks.     Continue to follow with GI for GERD/scleroderma esophagus. Discussed elevation of the head of her bed to help with nocturnal symptoms. Continue with PPI.    Continue Adcirca and PH follow-up.     Checking sputum culture and will give course of levaquin 750 mg daily x 7 days.     RTC in 4 weeks with repeat 6MWT and spirometry.       Claire Pierre PA-C  [facility]

## 2023-09-18 NOTE — LETTER
September 18, 2023        Luis Ahuja  [address]  [facility] [address]  Phone: [phone]  Fax: [phone]             [facility]  [address]  [facility] [address]  Phone: [phone]   Patient: Yamel Shah   MR Number: [id number]   YOB: 1950   Date of Visit: 9/18/2023       Dear Dr. Luis Ahuja    Thank you for referring Yamel Shah to me for evaluation. Attached you will find relevant portions of my assessment and plan of care.    If you have questions, please do not hesitate to call me. I look forward to following Yamel Shah along with you.    Sincerely,    FABRIZIO Ramososure    If you would like to receive this communication electronically, please contact [email or web address] or [phone] to request [employer] access.    [employer] is a tool which provides read-only access to select patient information with whom you have a relationship. Its easy to use and provides real time access to review your patients record including encounter summaries, notes, results, and demographic information.    If you feel you have received this communication in error or would no longer like to receive these types of communications, please e-mail [email or web address]

## 2023-09-18 NOTE — LETTER
September 25, 2023        Luis Ahuja  [address] [facility] [address]  Phone: [phone]  Fax: [phone]             [facility]  [address]  [facility] [address]  Phone: [phone]   Patient: Yamel Shah   MR Number: [id number]   YOB: 1950   Date of Visit: 9/18/2023       Dear Dr. Luis Ahuja    Thank you for referring Yamel Shah to me for evaluation. Attached you will find relevant portions of my assessment and plan of care.    If you have questions, please do not hesitate to call me. I look forward to following Yamel Shah along with you.    Sincerely,    Claire Pelaez, NP    Enclosure    If you would like to receive this communication electronically, please contact [email or web address] or [phone] to request [employer] Link access.    [employer] Link is a tool which provides read-only access to select patient information with whom you have a relationship. Its easy to use and provides real time access to review your patients record including encounter summaries, notes, results, and demographic information.    If you feel you have received this communication in error or would no longer like to receive these types of communications, please e-mail [email or web address]

## 2023-09-18 NOTE — PATIENT INSTRUCTIONS
Start taking spironolactone 25 mg, once daily.    Repeat labs on Friday ([facility]), locally.    Return to clinic in 6 months with labs, walk, ECHO.    If you are on any blood pressure medications make sure you have taken them. For blood pressure readings, sit in a calm, quite room with dim lighting. Feet flat on the floor, without stimulation (no TV, no talking) and relax for at least 3-5 minutes prior to measuring.    Recommend 2 gram sodium restriction and 1500cc fluid restriction.  Encourage physical activity with graded exercise program.  Requested patient to weigh themselves daily, and to notify us if their weight increases by more than 3 lbs in 1 day or 5 lbs in 1 week.

## 2023-09-19 ENCOUNTER — TELEPHONE (OUTPATIENT)
Dept: TRANSPLANT | Facility: CLINIC | Age: 73
End: 2023-09-19
Payer: MEDICARE

## 2023-09-19 NOTE — PROCEDURES
Yamel Shah is a 72 y.o.  female patient, who presents for a 6 minute walk test ordered by MD Neris.  The diagnosis is Interstitial Lung Disease; Scleroderma/CREST, Pulmonary Hypertension.  The patient's BMI is 25.3 kg/m2.  Predicted distance (lower limit of normal) is 306.2 meters.      Test Results:    The test was completed without stopping. The total time walked was 360 seconds. During walking, the patient reported:  Dyspnea. The patient used no assistive devices during testing. Oxygen saturation was measured using forehead oximetry probe.     09/18/2023---------Distance: 441.96 meters (1450 feet)     O2 Sat % Supplemental Oxygen Heart Rate Blood Pressure Ju Scale   Pre-exercise  (Resting) 100 % Room Air 75 bpm 152/67 mmHg 2   During Exercise 98 % Room Air 102 bpm 168/71 mmHg 3   Post-exercise  (Recovery) 100 % Room Air  90 bpm       Recovery Time: 123 seconds    Performing nurse/tech: Garrett ZALDIVAR      PREVIOUS STUDY:   08/17/2023---------Distance: 396.24 meters (1300 feet)       O2 Sat % Supplemental Oxygen Heart Rate Blood Pressure Ju Scale   Pre-exercise  (Resting) 98 % Room Air 70 bpm 141/63 mmHg 2   During Exercise 96 % Room Air 98 bpm 159/70 mmHg 4   Post-exercise  (Recovery) 100 % Room Air  82 bpm           CLINICAL INTERPRETATION:  Six minute walk distance is 441.96 meters (1450 feet) with moderate dyspnea.  During exercise, there was no significant desaturation while breathing room air.  Blood pressure remained stable and Heart rate increased significantly with walking.  Hypertension was present prior to exercise.  The patient did not report non-pulmonary symptoms during exercise.  Since the previous study in August 2023, exercise capacity may be somewhat improved.  Based upon age and body mass index, exercise capacity is normal.

## 2023-09-20 LAB
BACTERIA SPEC AEROBE CULT: NORMAL
BACTERIA SPEC AEROBE CULT: NORMAL
GRAM STN SPEC: NORMAL

## 2023-09-22 ENCOUNTER — PATIENT MESSAGE (OUTPATIENT)
Dept: TRANSPLANT | Facility: CLINIC | Age: 73
End: 2023-09-22
Payer: MEDICARE

## 2023-09-22 ENCOUNTER — LAB VISIT (OUTPATIENT)
Dept: LAB | Facility: HOSPITAL | Age: 73
End: 2023-09-22
Attending: INTERNAL MEDICINE
Payer: MEDICARE

## 2023-09-22 DIAGNOSIS — Z79.899 POLYPHARMACY: ICD-10-CM

## 2023-09-22 LAB
ALBUMIN SERPL BCP-MCNC: 3.1 G/DL (ref 3.5–5.2)
ALP SERPL-CCNC: 106 U/L (ref 55–135)
ALT SERPL W/O P-5'-P-CCNC: 8 U/L (ref 10–44)
ANION GAP SERPL CALC-SCNC: 8 MMOL/L (ref 8–16)
AST SERPL-CCNC: 17 U/L (ref 10–40)
BILIRUB SERPL-MCNC: 0.3 MG/DL (ref 0.1–1)
BUN SERPL-MCNC: 19 MG/DL (ref 8–23)
CALCIUM SERPL-MCNC: 8.8 MG/DL (ref 8.7–10.5)
CHLORIDE SERPL-SCNC: 110 MMOL/L (ref 95–110)
CO2 SERPL-SCNC: 22 MMOL/L (ref 23–29)
CREAT SERPL-MCNC: 0.7 MG/DL (ref 0.5–1.4)
EST. GFR  (NO RACE VARIABLE): >60 ML/MIN/1.73 M^2
GLUCOSE SERPL-MCNC: 111 MG/DL (ref 70–110)
POTASSIUM SERPL-SCNC: 4 MMOL/L (ref 3.5–5.1)
PROT SERPL-MCNC: 8 G/DL (ref 6–8.4)
SODIUM SERPL-SCNC: 140 MMOL/L (ref 136–145)

## 2023-09-22 PROCEDURE — 80053 COMPREHEN METABOLIC PANEL: CPT | Mod: NTX | Performed by: INTERNAL MEDICINE

## 2023-09-22 PROCEDURE — 36415 COLL VENOUS BLD VENIPUNCTURE: CPT | Mod: PN,TXP | Performed by: INTERNAL MEDICINE

## 2023-09-23 ENCOUNTER — OFFICE VISIT (OUTPATIENT)
Dept: INTERNAL MEDICINE | Facility: CLINIC | Age: 73
End: 2023-09-23
Payer: MEDICARE

## 2023-09-23 ENCOUNTER — HOSPITAL ENCOUNTER (OUTPATIENT)
Dept: RADIOLOGY | Facility: HOSPITAL | Age: 73
Discharge: HOME | End: 2023-09-23
Attending: INTERNAL MEDICINE
Payer: MEDICARE

## 2023-09-23 VITALS
HEART RATE: 86 BPM | WEIGHT: 149.5 LBS | HEIGHT: 65 IN | BODY MASS INDEX: 24.91 KG/M2 | SYSTOLIC BLOOD PRESSURE: 138 MMHG | OXYGEN SATURATION: 95 % | DIASTOLIC BLOOD PRESSURE: 60 MMHG

## 2023-09-23 DIAGNOSIS — D84.821 IMMUNOSUPPRESSION DUE TO DRUG THERAPY: ICD-10-CM

## 2023-09-23 DIAGNOSIS — J84.9 ILD (INTERSTITIAL LUNG DISEASE): ICD-10-CM

## 2023-09-23 DIAGNOSIS — I27.9 CHRONIC PULMONARY HEART DISEASE: ICD-10-CM

## 2023-09-23 DIAGNOSIS — Z79.899 IMMUNOSUPPRESSION DUE TO DRUG THERAPY: ICD-10-CM

## 2023-09-23 DIAGNOSIS — J22 LRTI (LOWER RESPIRATORY TRACT INFECTION): ICD-10-CM

## 2023-09-23 DIAGNOSIS — J84.9 ILD (INTERSTITIAL LUNG DISEASE): Primary | ICD-10-CM

## 2023-09-23 PROCEDURE — 99999PBSHW PR PBB SHADOW TECHNICAL ONLY FILED TO HB: ICD-10-PCS | Mod: PBBFAC,,,

## 2023-09-23 PROCEDURE — 99215 OFFICE O/P EST HI 40 MIN: CPT | Mod: S$PBB,,, | Performed by: INTERNAL MEDICINE

## 2023-09-23 PROCEDURE — 94640 AIRWAY INHALATION TREATMENT: CPT | Mod: PBBFAC,PO

## 2023-09-23 PROCEDURE — 99215 PR OFFICE/OUTPT VISIT, EST, LEVL V, 40-54 MIN: ICD-10-PCS | Mod: S$PBB,,, | Performed by: INTERNAL MEDICINE

## 2023-09-23 PROCEDURE — 71046 XR CHEST PA AND LATERAL: ICD-10-PCS | Mod: 26,NTX,, | Performed by: RADIOLOGY

## 2023-09-23 PROCEDURE — 99999 PR PBB SHADOW E&M-EST. PATIENT-LVL IV: CPT | Mod: PBBFAC,,, | Performed by: INTERNAL MEDICINE

## 2023-09-23 PROCEDURE — 99214 OFFICE O/P EST MOD 30 MIN: CPT | Mod: PBBFAC,PO | Performed by: INTERNAL MEDICINE

## 2023-09-23 PROCEDURE — 71046 X-RAY EXAM CHEST 2 VIEWS: CPT | Mod: 26,NTX,, | Performed by: RADIOLOGY

## 2023-09-23 PROCEDURE — 99999 PR PBB SHADOW E&M-EST. PATIENT-LVL IV: ICD-10-PCS | Mod: PBBFAC,,, | Performed by: INTERNAL MEDICINE

## 2023-09-23 PROCEDURE — 99999PBSHW PR PBB SHADOW TECHNICAL ONLY FILED TO HB: Mod: PBBFAC,,,

## 2023-09-23 PROCEDURE — 71046 X-RAY EXAM CHEST 2 VIEWS: CPT | Mod: TC,NTX

## 2023-09-23 RX ORDER — IPRATROPIUM BROMIDE AND ALBUTEROL SULFATE 2.5; .5 MG/3ML; MG/3ML
3 SOLUTION RESPIRATORY (INHALATION)
Status: COMPLETED | OUTPATIENT
Start: 2023-09-23 | End: 2023-09-23

## 2023-09-23 RX ORDER — ALBUTEROL SULFATE 0.83 MG/ML
2.5 SOLUTION RESPIRATORY (INHALATION) EVERY 4 HOURS PRN
Qty: 9 ML | Refills: 0 | Status: SHIPPED | OUTPATIENT
Start: 2023-09-23 | End: 2024-09-22

## 2023-09-23 RX ORDER — LEVOFLOXACIN 750 MG/1
750 TABLET ORAL DAILY
Qty: 7 TABLET | Refills: 0 | Status: SHIPPED | OUTPATIENT
Start: 2023-09-23 | End: 2023-10-23

## 2023-09-23 RX ADMIN — IPRATROPIUM BROMIDE AND ALBUTEROL SULFATE 3 ML: 2.5; .5 SOLUTION RESPIRATORY (INHALATION) at 11:09

## 2023-09-23 NOTE — PROGRESS NOTES
Patient ID: Yamel Shah is a 72 y.o. female.    Chief Complaint: No chief complaint on file.    LIDA Montesinos is a 72 y.o. female with scleroderma, ILD, pulmonary heart disease and immunosuppression who presents for urgent care visit with chief complaint of cough. She is a patient of Dr. Aly Rand. She has a complicated pulmonary history and follows with Transplant here at [facility].      Patient presents with chief complaint of cough. Exact onset is unknown.  However patient has had issues with cough going back to July 31st (per chart review).  This also includes when she had COVID in August. Associated symptoms: wheezing and productions of phlegm. Symptoms are constant in duration. Nothing is making them better. She is currently on levaquin day 6/7. She has albuterol, last use was last night. These have not relieved her symptoms. She is immunocompromised (on CellCept).  I reviewed her recent sputum culture results which were negative/normal.    Patient requests for nebulizer machine at home.     Review of Systems   Respiratory:  Positive for cough and wheezing.    All other systems reviewed and are negative.      Objective:     Vitals:    09/23/23 1137   BP: 138/60   Pulse: 86        Physical Exam  Vitals reviewed.   Constitutional:       General: She is not in acute distress.     Appearance: Normal appearance. She is well-developed. She is not ill-appearing, toxic-appearing or diaphoretic.   HENT:      Head: Normocephalic and atraumatic.      Right Ear: External ear normal.      Left Ear: External ear normal.      Nose: Nose normal.   Eyes:      General: No scleral icterus.        Right eye: No discharge.         Left eye: No discharge.      Extraocular Movements: Extraocular movements intact.      Conjunctiva/sclera: Conjunctivae normal.   Cardiovascular:      Rate and Rhythm: Normal rate and regular rhythm.      Heart sounds: Normal heart sounds. No murmur heard.     No friction rub. No gallop.    Pulmonary:      Effort: Pulmonary effort is normal. No respiratory distress.      Breath sounds: No stridor. Wheezing (diffuse, bilateral), rhonchi (diffuse and bilateral) and rales (diffuse, bilateral) present.   Skin:     General: Skin is warm and dry.   Neurological:      General: No focal deficit present.      Mental Status: She is alert and oriented to person, place, and time. Mental status is at baseline.   Psychiatric:         Mood and Affect: Mood normal.         Behavior: Behavior normal.         Thought Content: Thought content normal.         Judgment: Judgment normal.         Assessment:       1. ILD (interstitial lung disease) Chronic   2. Chronic pulmonary heart disease    3. Immunosuppression due to drug therapy Chronic   4. LRTI (lower respiratory tract infection) Chronic       Plan:     Discussed with patient that it is unclear to me if she has an active acute infection or if she simply has worsening of her ILD/scleroderma lung disease. I have given her a duoneb breathing treatment in the clinic and have given prescriptions for nebulizer and albuterol nebs to use at home. I have extended her course of levaquin for an additional 7 days. I am obtaining a CXR. If she has pneumonia while on levaquin, she will need to go to the ER. I am avoiding steroids due to reported heart disease (although her recent heart cath was normal). She needs to follow up with her lung doctor (who she messaged yesterday). Doctor may want to consider bronch if she is thinking this might be an infection. Otherwise, consider alternative tx for scleroderma lung disease (if there is one). If symptoms worsen, patient instructed to go to ER. Discussed all of this with patient and she agrees. Of note, I do believe this patient is too complicated to come into urgent care visits with new doctors. I hope she can see her lung doctor soon.     ILD (interstitial lung disease)  -     albuterol-ipratropium 2.5 mg-0.5 mg/3 mL nebulizer  solution 3 mL  -     X-Ray Chest PA And Lateral; Future; Expected date: 09/23/2023  -     levoFLOXacin (LEVAQUIN) 750 MG tablet; Take 1 tablet (750 mg total) by mouth once daily.  Dispense: 7 tablet; Refill: 0  -     NEBULIZER KIT (SUPPLIES) FOR HOME USE  -     NEBULIZER FOR HOME USE  -     albuterol (PROVENTIL) 2.5 mg /3 mL (0.083 %) nebulizer solution; Take 3 mLs (2.5 mg total) by nebulization every 4 (four) hours as needed for Wheezing or Shortness of Breath (or cough). Rescue  Dispense: 9 mL; Refill: 0    Chronic pulmonary heart disease  Comments:  Avoid steroids due to documented hx of pulmonary HTN    Immunosuppression due to drug therapy  -     albuterol-ipratropium 2.5 mg-0.5 mg/3 mL nebulizer solution 3 mL  -     X-Ray Chest PA And Lateral; Future; Expected date: 09/23/2023  -     levoFLOXacin (LEVAQUIN) 750 MG tablet; Take 1 tablet (750 mg total) by mouth once daily.  Dispense: 7 tablet; Refill: 0    LRTI (lower respiratory tract infection)  -     NEBULIZER KIT (SUPPLIES) FOR HOME USE  -     NEBULIZER FOR HOME USE        RTC PRN    I spent a total of 55 minutes on the day of the visit.This includes face to face time and non-face to face time preparing to see the patient (eg, review of tests), obtaining and/or reviewing separately obtained history, documenting clinical information in the electronic or other health record, independently interpreting results and communicating results to the patient/family/caregiver, or care coordinator.     Warning signs discussed, patient to call with any further issues or worsening of symptoms.       Parts of the above note were dictated using a voice dictation software. Please excuse any grammatical or typographical errors.

## 2023-09-25 ENCOUNTER — TELEPHONE (OUTPATIENT)
Dept: TRANSPLANT | Facility: CLINIC | Age: 73
End: 2023-09-25
Payer: MEDICARE

## 2023-09-25 NOTE — PATIENT INSTRUCTIONS
Alcides Morocho     [id number]      1960    REASON FOR VISIT:  Hospital/rehab discharge follow-up    HPI: Lamont here today for the above.      Per chart review, patient admitted on 9/15/2023-9/25/2023 for donor available liver transplantation.    During hospitalization, patient underwent liver transplant 8/31/2023, postoperative course was complicated by significant postop bleeding and perihepatic hematoma.  Patient was taken back to the OR 9/3/2023 where cauterization of liver laceration for control of bleeding was performed.  Patient also noted with postoperative bile leak, seen by Gastroenterology, underwent ERCP where no obvious leak was detected.  Stent was placed.    During hospital stay, had gradual improvement with appetite and oral intake.  He was noted to have continual abdominal discomfort with worsening of low back pain, longstanding in nature.    During rehab admission, patient was able to participate in therapies, demonstrated impairments in both mobility and performance of ADLs.  Based on assessment of medical and functional needs, was accepted to inpatient rehab.      He was able to participate actively with rehab team, noted to make strong gains in all areas, achieving supervision to minimal assistance for household activities and most ADLs.  Teaching was completed with wife regarding patient's limitations at time of discharge.  Patient was able to have improved therapy participation and functional gains during rehab stay.      He continued and continues to be followed by rehab transplant team for ongoing management of transplantation.      There is also plan for ongoing home health care for nursing, PT, and OT services following patient's discharge.              ROS:  Constitutional:  ***  Eyes: ***  ENT:  ***  Respiratory:  ***   Cardiovascular:  ***     GI:  ***  : ***   Musculoskeletal:  ***   Integument:  ***   Neurologic:  ***   Endocrine:  ***   Lymphatic: ***   Psychiatric:   Iodosorb gel (or medihoney if your insurance will not cover the iodosorb) to bilateral foot full-thickness ulcers and cover with hydrofiber.  Hydrofiber and gauze in between toes.  Pad left lateral foot with ABD pad for cushioning.  Cover with gauze and secure with roll gauze.     ***          Review Flowsheet  More data exists       9/15/2023   PHQ 2/9 Score   Adult PHQ 2 Score 0   Adult PHQ 2 Interpretation No further screening needed   Little interest or pleasure in activity? Not at all   Feeling down, depressed or hopeless? Not at all       DEPRESSION ASSESSMENT/PLAN:  {[facility] Depression Screening Follow-Up:712078}        Physical Exam:  There were no vitals taken for this visit.  General: alert, cooperative, no acute distress, nontoxic appearing   Head: atraumatic, normocephalic   Eyes: without conjunctival injection, PERRL, EOM's intact  Ears: normal TM's and external ear canals both ears   Nose: normal turbinates, no swelling or drainage or sinus tenderness   Throat: clear oropharynx, mmm, dentition in good repair   Neck: supple, symmetrical, trachea midline   Lungs: clear to auscultation bilaterally, respirations unlabored, no wheezing, rales   Heart: regular rate and rhythm, no murmur, gallop, rub  Abdomen: soft, non-tender to palpation, bowel sounds normal, no masses, no organomegaly   Extremities: no clubbing, cyanosis or edema, full ROM   Neurologic: CNII-XII grossly intact , Alert and oriented x 3, 5/5 muscle strength, +2/4 symmetric reflexes.    No diagnosis found.    ***    No follow-ups on file.      Magdiel Pearce,

## 2023-09-25 NOTE — TELEPHONE ENCOUNTER
Pulmonary Hypertension Nurse Navigator attempted to contact Yamel Shah to follow up on medication changes/results of labs.  Left voicemail message with callback information.    11:03 AM  Yamel Shah called NN back. She states that she is doing better today than she did on Saturday. She went to Urgent Care on Saturday, where she was put on an albuterol nebulizer solution and her antibiotic was extended. She does state that she is taking the new medication prescribed from ROSEMARIE Pelaez DNP APRN, and is urinating a lot.  NN advised that provider would be notified.

## 2023-09-26 ENCOUNTER — TELEPHONE (OUTPATIENT)
Dept: TRANSPLANT | Facility: CLINIC | Age: 73
End: 2023-09-26
Payer: MEDICARE

## 2023-09-26 DIAGNOSIS — J22 LRTI (LOWER RESPIRATORY TRACT INFECTION): Primary | ICD-10-CM

## 2023-09-26 DIAGNOSIS — J84.9 ILD (INTERSTITIAL LUNG DISEASE): ICD-10-CM

## 2023-09-26 NOTE — TELEPHONE ENCOUNTER
----- Message from Roberta Leigh DO sent at 9/26/2023  8:16 AM CDT -----  She has worsening imaging and bronch could exacerbate her ILD.  I would make sure that she is taking flonase and a daily antihistamine as post COVID sinus drainage/cough can last for quite some time.  Can obtain sputum for culture, AFB, and fungus--can do home collect as the sputum samples she has produced in clinic have not been adequate recently.  Can continue with nebs as prescribed by the .  Needs to see Dr. Benites for esophageal testing as she has significant esophageal dysfunction and gastroparesis that is likely contributing.  We discussed starting OFEV at her last appointment--she deferred but I'm still concerned over her imaging progression and decline in PFTs.  Is she still on abx?  I'll attach Dr. Ahuja to this message just so he is aware of what has been going on with her.    ----- Message -----  From: Johanna Fernandez  Sent: 9/25/2023  11:42 AM CDT  To: Roberta Leigh, DO    Please advise if any action needed by our team. (Bronch?) please review the  provider note.

## 2023-09-26 NOTE — TELEPHONE ENCOUNTER
Attempted to reach patient to discuss scheduling of outpatient bronch in OR. No answer, left voicemail requesting call back.  ----- Message from Roberta Leigh DO sent at 9/26/2023 10:00 AM CDT -----  I'm on inpatient next week and can bronch her if there's nothing that grows from her sputum cultures this week.  Dominguez, will need to be an OR bronch preferably at 8am on M, T, or Th.  Given her ILD exacerbation she is at risk for respiratory decline with bronch but hopefully in the OR this can be controlled.  Otherwise, Dominguez, please continue with the other recommendations in the first message.  Dr. Ahuja do you think she should hold her MMF for a short period of time given infectious concerns?  Thanks    ----- Message -----  From: Luis Ahuja MD  Sent: 9/26/2023   8:29 AM CDT  To: Roberta Leigh DO; Johanna Granados, still concern for infectious driven exacerbation of her ILD which has been stable for decades. We are seeing her next month and if infectious cause(besides  post Covid related worsening) ruled out, addition of OFEF and/or rituximab could be considered.  Bronch with BAL is not an option before deciding this? RJQ  ----- Message -----  From: Roberta Leigh DO  Sent: 9/26/2023   8:21 AM CDT  To: Luis Ahuja MD; Johanna Fernandez    She has worsening imaging and bronch could exacerbate her ILD.  I would make sure that she is taking flonase and a daily antihistamine as post COVID sinus drainage/cough can last for quite some time.  Can obtain sputum for culture, AFB, and fungus--can do home collect as the sputum samples she has produced in clinic have not been adequate recently.  Can continue with nebs as prescribed by the .  Needs to see Dr. Benites for esophageal testing as she has significant esophageal dysfunction and gastroparesis that is likely contributing.  We discussed starting OFEV at her last appointment--she deferred but I'm still concerned over  her imaging progression and decline in PFTs.  Is she still on abx?  I'll attach Dr. Ahuja to this message just so he is aware of what has been going on with her.    ----- Message -----  From: Johanna Fernandez  Sent: 9/25/2023  11:42 AM CDT  To: Roberta Leigh, DO    Please advise if any action needed by our team. (Bronch?) please review the  provider note.

## 2023-09-27 NOTE — TELEPHONE ENCOUNTER
Spoke with patient regarding planning of outpatient bronchoscopy. She verbalized understanding and reports she will come for OR bronch, requests Monday morning if available. Patient states she stopped her cellcept (per Dr. Ahuja), that she is not taking OTC flonase nor a daily antihistamine. Patient is aware these can be obtained OTC. Patient reports her mouth is dry after starting the spironolactone as well. The patient denies taking any aspirin or blood thinner. Patient will obtain OTC anti-histamine and flonase. Will take nebs prn, reports it gives her a headache and she is not sure if it and the albuterol rescue inhaler are helping.     Provided to patient via my [facility] message:   For bronchoscopy procedure:     1. Nothing to eat or drink after midnight,   2. Bring all routine meds to take after procedure (do not take medication prior to procedure),   3. Procedure scheduled on Monday, October 2 at 0800 with Dr. Leigh,   4. Arrive at 0700 to [facility] (day of surgery [place]), 2nd floor [facility],   5. Come with family/friend to drive you home,   6. Bring insurance card and picture ID (to leave with family/friend after verification).     Please let me know if you have any further questions.       Thank you,   ANTON Spencer     ----- Message from Pj Lund sent at 9/27/2023  4:44 PM CDT -----  Regarding: miss call  Pt returning miss call    Call

## 2023-09-28 ENCOUNTER — PATIENT MESSAGE (OUTPATIENT)
Dept: TRANSPLANT | Facility: CLINIC | Age: 73
End: 2023-09-28
Payer: MEDICARE

## 2023-09-28 ENCOUNTER — LAB VISIT (OUTPATIENT)
Dept: LAB | Facility: HOSPITAL | Age: 73
End: 2023-09-28
Attending: INTERNAL MEDICINE
Payer: MEDICARE

## 2023-09-28 DIAGNOSIS — J84.9 ILD (INTERSTITIAL LUNG DISEASE): ICD-10-CM

## 2023-09-28 DIAGNOSIS — J22 LRTI (LOWER RESPIRATORY TRACT INFECTION): ICD-10-CM

## 2023-09-28 DIAGNOSIS — J22 LRTI (LOWER RESPIRATORY TRACT INFECTION): Primary | ICD-10-CM

## 2023-09-28 DIAGNOSIS — J98.8 RESPIRATORY INFECTION: ICD-10-CM

## 2023-09-28 PROCEDURE — 87206 SMEAR FLUORESCENT/ACID STAI: CPT | Mod: TXP | Performed by: INTERNAL MEDICINE

## 2023-09-28 PROCEDURE — 87106 FUNGI IDENTIFICATION YEAST: CPT | Mod: NTX | Performed by: INTERNAL MEDICINE

## 2023-09-28 PROCEDURE — 87102 FUNGUS ISOLATION CULTURE: CPT | Mod: NTX | Performed by: INTERNAL MEDICINE

## 2023-09-28 PROCEDURE — 87070 CULTURE OTHR SPECIMN AEROBIC: CPT | Mod: NTX | Performed by: INTERNAL MEDICINE

## 2023-09-28 PROCEDURE — 87116 MYCOBACTERIA CULTURE: CPT | Mod: NTX | Performed by: INTERNAL MEDICINE

## 2023-09-28 PROCEDURE — 87205 SMEAR GRAM STAIN: CPT | Mod: NTX | Performed by: INTERNAL MEDICINE

## 2023-09-28 PROCEDURE — 87015 SPECIMEN INFECT AGNT CONCNTJ: CPT | Mod: TXP | Performed by: INTERNAL MEDICINE

## 2023-09-29 ENCOUNTER — ANESTHESIA EVENT (OUTPATIENT)
Dept: SURGERY | Facility: HOSPITAL | Age: 73
End: 2023-09-29
Payer: MEDICARE

## 2023-09-29 ENCOUNTER — PATIENT MESSAGE (OUTPATIENT)
Dept: TRANSPLANT | Facility: CLINIC | Age: 73
End: 2023-09-29
Payer: MEDICARE

## 2023-09-29 NOTE — ANESTHESIA PREPROCEDURE EVALUATION
[facility]  Anesthesia Pre-Operative Evaluation         Patient Name: Yamel Shah  YOB: 1950  MRN: [id number]    SUBJECTIVE:     Pre-operative evaluation for Procedure(s) (LRB):  bronch (N/A)     09/29/2023    Yamel Shah is a 72 y.o. female w/ a significant PMHx of scleroderma, ILD, pulmonary heart disease (last PAP 40mg) and immunosuppression (on cellcept). Currently taking adcirca. Of note pt hospitalized for covid in August, recently completed a course of levaquin.     RHC in March, 2023 shows normal PA pressures, normal filling pressures, normal CO/CI. She is followed closely by Rheum and ALD/lung transplant.    Patient now presents for the above procedure(s).    Echo Summary  Results for orders placed during the hospital encounter of 08/17/23    Echo    Interpretation Summary    Left Ventricle: The left ventricle is normal in size. Normal wall thickness. Normal wall motion. There is normal systolic function with a visually estimated ejection fraction of 60 - 65%. There is normal diastolic function.    Right Ventricle: Normal right ventricular cavity size. Wall thickness is normal. Right ventricle wall motion  is normal. Systolic function is normal.    Pulmonary Artery: The estimated pulmonary artery systolic pressure is 40 mmHg.       Prev airway 5/26/23:    Induction:  Intravenous    Intubated:  Postinduction    Mask Ventilation:  Easy with oral airway    Attempts:  1    Attempted By:  Resident anesthesiologist    Method of Intubation:  Video laryngoscopy    Blade:  West 3    Laryngeal View Grade: Grade IIA - cords partially seen      Difficult Airway Encountered?: No      Complications:  None    Airway Device:  Oral endotracheal tube    Airway Device Size:  7.0    Style/Cuff Inflation:  Cuffed    Inflation Amount (mL):  7    Tube secured:  21    Secured at:  The lips    Placement Verified By:  Capnometry and Revisualization with  laryngoscopy    Complicating Factors:  None    Findings Post-Intubation:  BS equal bilateral         Patient Active Problem List   Diagnosis    Scleroderma with pulmonary involvement    Idiopathic neuropathy    Pulmonary hypertension associated with systemic disorder    Venous insufficiency of both lower extremities    Telangiectasia    ILD (interstitial lung disease)    Fecal incontinence    Urinary incontinence    Vitamin D deficiency    Osteopenia    Skin ulcers of both feet    Scleroderma    Raynaud's disease without gangrene    contracture    Essential hypertension    ROXANNE (iron deficiency anemia)    Arterial insufficiency with ischemic ulcer    Chronic pain disorder    Stiffness in joint    Painful cervical range of motion    Weakness of both upper extremities    Posture abnormality    Abnormal finding of blood chemistry, unspecified     Pulmonary hypertension    Cervical spondylosis    Pelvic floor dysfunction    PVD (peripheral vascular disease)    Immunosuppression due to drug therapy    Calcification of aorta    Chronic pulmonary heart disease    Right inguinal hernia    Femoral hernia of right side       Review of patient's allergies indicates:   Allergen Reactions    Sulfa (sulfonamide antibiotics) Rash     Other reaction(s): Rash    Tramadol Itching and Rash       Current Inpatient Medications:      No current facility-administered medications on file prior to encounter.     Current Outpatient Medications on File Prior to Encounter   Medication Sig Dispense Refill    albuterol (PROVENTIL) 2.5 mg /3 mL (0.083 %) nebulizer solution Take 3 mLs (2.5 mg total) by nebulization every 4 (four) hours as needed for Wheezing or Shortness of Breath (or cough). Rescue 9 mL 0    albuterol (VENTOLIN HFA) 90 mcg/actuation inhaler Inhale 2 puffs into the lungs every 4 (four) hours as needed for Wheezing. Rescue 18 g 3    carboxymethylcellulose sodium (REFRESH TEARS) 0.5 % Drop Apply  1-2 drops to every as needed      ergocalciferol (ERGOCALCIFEROL) 50,000 unit Cap TAKE 1 CAPSULE BY MOUTH EVERY 7 DAYS 12 capsule 1    levoFLOXacin (LEVAQUIN) 750 MG tablet Take 1 tablet (750 mg total) by mouth once daily. 7 tablet 0    multivitamin capsule Take 1 capsule by mouth once daily.      mycophenolate mofetil (CELLCEPT) 200 mg/mL SusR Take 5 mLs (1,000 mg total) by mouth 2 (two) times daily. (Patient not taking: Reported on 9/18/2023) 480 mL 1    nebulizer and compressor Rosemary Use as directed 1 each 0    NIFEdipine (ADALAT CC) 90 MG TbSR TAKE 1 TABLET(90 MG) BY MOUTH EVERY DAY (Patient taking differently: Take by mouth nightly. TAKE 1 TABLET(90 MG) BY MOUTH EVERY DAY (TAKES NIGHTLY WITH 30 MG TABLET TOTAL 120 MG)) 90 tablet 3    NIFEdipine (PROCARDIA-XL) 30 MG (OSM) 24 hr tablet TAKE 1 TABLET(30 MG) BY MOUTH EVERY DAY (Patient taking differently: Take by mouth nightly. TAKE WITH 90 MG TABLET EVERY NIGHT (TOTAL 120 MG)) 30 tablet 11    pravastatin (PRAVACHOL) 20 MG tablet Take 1 tablet (20 mg total) by mouth every evening. 90 tablet 2    pregabalin (LYRICA) 300 MG Cap Take 1 capsule (300 mg total) by mouth 2 (two) times daily. 60 capsule 6    PREVIDENT 5000 BOOSTER PLUS 1.1 % Pste SMARTSIG:To Teeth      PREVIDENT 5000 SENSITIVE 1.1-5 % Pste BRUSH FOR 2 MINUTES TWICE PER DAY      RABEprazole (ACIPHEX) 20 mg tablet Take 1 tablet (20 mg total) by mouth 2 (two) times daily. 60 tablet 11    spironolactone (ALDACTONE) 25 MG tablet Take 1 tablet (25 mg total) by mouth once daily. 30 tablet 11    tadalafil (ADCIRCA) 20 mg Tab Take 2 tablets (40 mg total) by mouth once daily. (Patient taking differently: Take 40 mg by mouth every evening.) 60 tablet 11    tiZANidine (ZANAFLEX) 4 MG tablet Take 1 tablet (4 mg total) by mouth nightly as needed (muscle pain). 30 tablet 3       Past Surgical History:   Procedure Laterality Date    24 HOUR IMPEDANCE PH MONITORING OF ESOPHAGUS IN PATIENT NOT TAKING ACID  REDUCING MEDICATIONS N/A 03/04/2020    Procedure: IMPEDANCE PH STUDY, ESOPHAGEAL, 24 HOUR, IN PATIENT NOT TAKING ACID REDUCING MEDICATION;  Surgeon: Annamaria Mendoza MD;  Location: [facility] (4TH FLR);  Service: Endoscopy;  Laterality: N/A;  OFF PPI/H2 Blocker   Motility Studies   Hold Narcotics x 1 days   Hold TCA x 1 days  2/26 - LVM attempting to confirm appt  2/27 - Confirmed appt    ANORECTAL MANOMETRY N/A 05/10/2021    Procedure: MANOMETRY, ANORECTAL with balloon expulsion test;  Surgeon: Annamaria Mendoza MD;  Location: [facility] (4TH FLR);  Service: Endoscopy;  Laterality: N/A;  order combined  covid test 5/7 Rastafari, instructions emailed-[facility]    BREAST BIOPSY      Left, benign    CERVICAL CONIZATION   W/ LASER  1970    COLONOSCOPY      COLONOSCOPY N/A 03/29/2019    Procedure: COLONOSCOPY;  Surgeon: Annamaria Mendoza MD;  Location: [facility] (2ND FLR);  Service: Endoscopy;  Laterality: N/A;    COLONOSCOPY N/A 05/10/2021    Procedure: COLONOSCOPY;  Surgeon: Annamaria Mendoza MD;  Location: [facility] (4TH FLR);  Service: Endoscopy;  Laterality: N/A;  2nd floor-previous scopes done on 2nd floor, gastroparesis  full liquid diet x2 days, clear liquid x1 day prior to procedure  covid test 5/7 [place]astafari, instructions emailed-[facility]  5/6 pt confirmed appt-[facility]    DILATION AND CURETTAGE OF UTERUS      ESOPHAGEAL MANOMETRY WITH MEASUREMENT OF IMPEDANCE N/A 03/04/2020    Procedure: MANOMETRY, ESOPHAGUS, WITH IMPEDANCE MEASUREMENT;  Surgeon: Annamaria Mendoza MD;  Location: [facility] (4TH FLR);  Service: Endoscopy;  Laterality: N/A;  OFF PPI/H2 Blocker   Motility Studies   Hold Narcotics x 1 days   Hold TCA x 1 days    ESOPHAGOGASTRODUODENOSCOPY      ESOPHAGOGASTRODUODENOSCOPY N/A 03/29/2019    Procedure: EGD (ESOPHAGOGASTRODUODENOSCOPY);  Surgeon: Annamaria Mendoza MD;  Location: [facility] (2ND FLR);  Service: Endoscopy;  Laterality: N/A;  pulmonary htn    ESOPHAGOGASTRODUODENOSCOPY N/A 02/02/2021    Procedure:  ESOPHAGOGASTRODUODENOSCOPY (EGD);  Surgeon: Annamaria Mendoza MD;  Location: [facility] ENDO (2ND FLR);  Service: Endoscopy;  Laterality: N/A;  2nd floor gastroparesis  3 days full liquid diet and 1 day clears  covid test 1/30 primary care, instructions sent to [facility]    ESOPHAGOGASTRODUODENOSCOPY N/A 07/01/2022    Procedure: ESOPHAGOGASTRODUODENOSCOPY (EGD);  Surgeon: Annamaria Mendoza MD;  Location: [facility] ENDO (2ND FLR);  Service: Endoscopy;  Laterality: N/A;  2nd floor-gastroparesis  full liquid diet x3 days, clear liquid diet x1 day prior to procedure  fully vaccinated, [facility]  [facility]    EXCISION, LESION, STOMACH, LAPAROSCOPIC N/A 03/23/2023    Procedure: XI ROBOTIC EXCISION, LESION, STOMACH;  Surgeon: Katherine Segura MD;  Location: [facility] OR [address] [place] [address];  Service: General;  Laterality: N/A;    EXCISIONAL BIOPSY, LYMPH NODE Right 05/26/2023    Procedure: EXCISIONAL BIOPSY, LYMPH NODE, INGUINAL;  Surgeon: Katherine Segura MD;  Location: [facility] OR [address] [place] [address];  Service: General;  Laterality: Right;    HYSTERECTOMY  1990    FRANDY (AUB, Fibroids), ovaries remain    REPAIR, HERNIA, UMBILICAL N/A 03/23/2023    Procedure: REPAIR, HERNIA, UMBILICAL;  Surgeon: Katherine Segura MD;  Location: [facility] OR [address] [place] [address];  Service: General;  Laterality: N/A;    RIGHT HEART CATHETERIZATION Right 01/05/2021    Procedure: INSERTION, CATHETER, RIGHT HEART;  Surgeon: Aureliano Sy MD;  Location: [facility] CATH LAB;  Service: Cardiology;  Laterality: Right;    RIGHT HEART CATHETERIZATION Right 03/16/2023    Procedure: INSERTION, CATHETER, RIGHT HEART;  Surgeon: Aureliano Sy MD;  Location: [facility] CATH LAB;  Service: Cardiology;  Laterality: Right;    ROBOT-ASSISTED LAPAROSCOPIC REPAIR OF INGUINAL HERNIA USING DA VERNELL XI Right 05/26/2023    Procedure: XI ROBOTIC REPAIR, HERNIA, INGUINAL, FEMORAL;  Surgeon: Katherine Segura MD;  Location:  [facility] OR 2ND FLR;  Service: General;  Laterality: Right;    ROBOT-ASSISTED REPAIR OF HIATAL HERNIA USING DA VERNELL XI N/A 03/23/2023    Procedure: XI ROBOTIC REPAIR, HERNIA, HIATAL;  Surgeon: Katherine Segura MD;  Location: [facility] OR 2ND FLR;  Service: General;  Laterality: N/A;    VARICOSE VEIN SURGERY      XI ROBOTIC GASTROPEXY N/A 03/23/2023    Procedure: XI ROBOTIC GASTROPEXY;  Surgeon: Katherine Segura MD;  Location: [facility] OR 2ND FLR;  Service: General;  Laterality: N/A;    XI ROBOTIC PYLOROMYOTOMY N/A 03/23/2023    Procedure: XI ROBOTIC PYLOROMYOTOMY;  Surgeon: Katherine Segura MD;  [facility];  Service: General;  Laterality: N/A;       Social History:  Tobacco Use: Low Risk  (9/23/2023)    Patient History     Smoking Tobacco Use: Never     Smokeless Tobacco Use: Never     Passive Exposure: Never      Alcohol Use: Not At Risk (9/18/2023)    AUDIT-C     Frequency of Alcohol Consumption: Monthly or less     Average Number of Drinks: 1 or 2     Frequency of Binge Drinking: Never        OBJECTIVE:     Vital Signs Range (Last 24H):         Significant Labs:  Lab Results   Component Value Date    WBC 5.18 09/18/2023    HGB 11.3 (L) 09/18/2023    HCT 35.6 (L) 09/18/2023     09/18/2023    CHOL 109 (L) 10/07/2022    TRIG 58 10/07/2022    HDL 43 10/07/2022    ALT 8 (L) 09/22/2023    AST 17 09/22/2023     09/22/2023    K 4.0 09/22/2023     09/22/2023    CREATININE 0.7 09/22/2023    BUN 19 09/22/2023    CO2 22 (L) 09/22/2023    TSH 3.041 10/07/2022    INR 1.0 06/29/2015    HGBA1C 5.2 10/07/2022       Diagnostic Studies: No relevant studies.    EKG:   Results for orders placed or performed during the hospital encounter of 02/28/23   SCHEDULED EKG 12-LEAD (to Muse)    Collection Time: 02/28/23 11:45 AM    Narrative    Test Reason : K44.9,K21.00,    Vent. Rate : 077 BPM     Atrial Rate : 077 BPM     P-R Int : 154 ms          QRS Dur : 090 ms      QT Int : 410  ms       P-R-T Axes : 028 -45 079 degrees     QTc Int : 463 ms    Normal sinus rhythm  Left anterior fascicular block  Abnormal ECG  When compared with ECG of 06-SEP-2019 11:54,  No significant change was found  Confirmed by Rigo Mishra MD ([id number]) on 2/28/2023 1:01:54 PM    Referred By: JOE SALDIVAR           Confirmed By:Rigo Mishra MD       2D ECHO:  TTE:  Results for orders placed or performed during the hospital encounter of 08/17/23   Echo   Result Value Ref Range    BSA 1.77 m2    LVOT stroke volume 66.74 cm3    LVIDd 4.60 3.5 - 6.0 cm    LV Systolic Volume 25.52 mL    LV Systolic Volume Index 14.5 mL/m2    LVIDs 2.64 2.1 - 4.0 cm    LV Diastolic Volume 97.10 mL    LV Diastolic Volume Index 55.17 mL/m2    IVS 0.96 0.6 - 1.1 cm    LVOT diameter 1.63 cm    LVOT area 2.1 cm2    FS 43 28 - 44 %    Left Ventricle Relative Wall Thickness 0.42 cm    Posterior Wall 0.96 0.6 - 1.1 cm    LV mass 150.22 g    LV Mass Index 85 g/m2    MV Peak E Dionicio 1.01 m/s    TDI LATERAL 0.13 m/s    TDI SEPTAL 0.11 m/s    E/E' ratio 8.42 m/s    MV Peak A Dionicio 1.00 m/s    TR Max Dionicio 3.05 m/s    E/A ratio 1.01     E wave deceleration time 211.78 msec    LV SEPTAL E/E' RATIO 9.18 m/s    LV LATERAL E/E' RATIO 7.77 m/s    LVOT peak dionicio 1.31 m/s    Left Ventricular Outflow Tract Mean Velocity 0.88 cm/s    Left Ventricular Outflow Tract Mean Gradient 3.61 mmHg    LA size 3.83 cm    Left Atrium Major Axis 5.21 cm    Left Atrium Minor Axis 4.87 cm    RVDD 3.16 cm    RV S' 13.76 cm/s    TAPSE 2.88 cm    RA Major Axis 4.82 cm    AV mean gradient 7 mmHg    AV peak gradient 14 mmHg    Ao peak dionicio 1.84 m/s    Ao VTI 42.40 cm    LVOT peak VTI 32.00 cm    AV valve area 1.57 cm²    AV Velocity Ratio 0.71     AV index (prosthetic) 0.75     VÍCTOR by Velocity Ratio 1.48 cm²    Mr max dionicio 5.40 m/s    MV mean gradient 2 mmHg    MV peak gradient 7 mmHg    MV stenosis pressure 1/2 time 61.42 ms    MV valve area p 1/2 method 3.58 cm2    MV valve area  by continuity eq 2.05 cm2    MV VTI 32.5 cm    Triscuspid Valve Regurgitation Peak Gradient 37 mmHg    PV PEAK VELOCITY 0.91 m/s    PV peak gradient 3 mmHg    Sinus 2.75 cm    STJ 1.92 cm    Ascending aorta 2.75 cm    Mean e' 0.12 m/s    ZLVIDS -1.04     ZLVIDD -0.56     LA Volume Index 41.0 mL/m2    LA volume 72.11 cm3    LA WIDTH 4.4 cm    TV resting pulmonary artery pressure 40 mmHg    RV TB RVSP 6 mmHg    Est. RA pres 3 mmHg    Narrative      Left Ventricle: The left ventricle is normal in size. Normal wall   thickness. Normal wall motion. There is normal systolic function with a   visually estimated ejection fraction of 60 - 65%. There is normal   diastolic function.    Right Ventricle: Normal right ventricular cavity size. Wall thickness   is normal. Right ventricle wall motion  is normal. Systolic function is   normal.    Pulmonary Artery: The estimated pulmonary artery systolic pressure is   40 mmHg.         NELLIE:  No results found. However, due to the size of the patient record, not all encounters were searched. Please check Results Review for a complete set of results.    ASSESSMENT/PLAN:           Pre-op Assessment    I have reviewed the Patient Summary Reports.     I have reviewed the Nursing Notes.    I have reviewed the Medications.     Review of Systems  Anesthesia Hx:  No problems with previous Anesthesia  History of prior surgery of interest to airway management or planning: Denies Family Hx of Anesthesia complications.   Denies Personal Hx of Anesthesia complications.   Cardiovascular:   Hypertension Denies CAD.     Functional Capacity Can you climb two flights of stairs? ==> Yes    Pulmonary:   Denies Asthma.  Denies Sleep Apnea.       Interstitial lung disease, uses inhalers PRN, no home O2. LAst inhaler about a month ago, none needed today.         Renal/:   Denies Chronic Renal Disease.     Hepatic/GI:   Denies PUD. GERD Denies Liver Disease.    Musculoskeletal:           Sjogrens,  raynauds, scleroderma with finger and lung involvement       Neurological:   Denies CVA. Denies Seizures.    Endocrine:   Denies Diabetes. Denies Hypothyroidism.        Physical Exam  General: Alert    Airway:  Mallampati: I   Mouth Opening: Normal  TM Distance: Normal  Tongue: Normal  Neck ROM: Normal ROM    Dental:  Intact, Caps / Implants        Anesthesia Plan  Type of Anesthesia, risks & benefits discussed:    Anesthesia Type: Gen ETT  Intra-op Monitoring Plan: Standard ASA Monitors  Post Op Pain Control Plan: multimodal analgesia and IV/PO Opioids PRN  Induction:  IV  Airway Plan: Direct and Video  Informed Consent: Informed consent signed with the Patient and all parties understand the risks and agree with anesthesia plan.  All questions answered.   ASA Score: 3    Ready For Surgery From Anesthesia Perspective.     .

## 2023-10-01 LAB
BACTERIA SPEC AEROBE CULT: NORMAL
BACTERIA SPEC AEROBE CULT: NORMAL
GRAM STN SPEC: NORMAL

## 2023-10-02 ENCOUNTER — ANESTHESIA (OUTPATIENT)
Dept: SURGERY | Facility: HOSPITAL | Age: 73
End: 2023-10-02
Payer: MEDICARE

## 2023-10-02 ENCOUNTER — HOSPITAL ENCOUNTER (OUTPATIENT)
Facility: HOSPITAL | Age: 73
Discharge: HOME | End: 2023-10-02
Attending: INTERNAL MEDICINE | Admitting: INTERNAL MEDICINE
Payer: MEDICARE

## 2023-10-02 VITALS
BODY MASS INDEX: 24.83 KG/M2 | RESPIRATION RATE: 20 BRPM | SYSTOLIC BLOOD PRESSURE: 131 MMHG | DIASTOLIC BLOOD PRESSURE: 62 MMHG | HEIGHT: 65 IN | HEART RATE: 64 BPM | OXYGEN SATURATION: 97 % | TEMPERATURE: 98 F | WEIGHT: 149 LBS

## 2023-10-02 DIAGNOSIS — J84.9 ILD (INTERSTITIAL LUNG DISEASE): ICD-10-CM

## 2023-10-02 DIAGNOSIS — J98.8 RESPIRATORY INFECTION: ICD-10-CM

## 2023-10-02 DIAGNOSIS — J22 LRTI (LOWER RESPIRATORY TRACT INFECTION): Primary | ICD-10-CM

## 2023-10-02 LAB
APPEARANCE FLD: CLEAR
BODY FLD TYPE: NORMAL
COLOR FLD: COLORLESS
LYMPHOCYTES NFR FLD MANUAL: 5 %
MONOS+MACROS NFR FLD MANUAL: 90 %
NEUTROPHILS NFR FLD MANUAL: 5 %
WBC # FLD: 51 /CU MM

## 2023-10-02 PROCEDURE — 87015 SPECIMEN INFECT AGNT CONCNTJ: CPT | Mod: NTX | Performed by: INTERNAL MEDICINE

## 2023-10-02 PROCEDURE — 87305 ASPERGILLUS AG IA: CPT | Mod: 59,NTX | Performed by: INTERNAL MEDICINE

## 2023-10-02 PROCEDURE — 89051 BODY FLUID CELL COUNT: CPT | Mod: TXP | Performed by: INTERNAL MEDICINE

## 2023-10-02 PROCEDURE — 63600175 PHARM REV CODE 636 W HCPCS: Mod: NTX | Performed by: NURSE ANESTHETIST, CERTIFIED REGISTERED

## 2023-10-02 PROCEDURE — 25000003 PHARM REV CODE 250: Mod: NTX | Performed by: INTERNAL MEDICINE

## 2023-10-02 PROCEDURE — D9220A PRA ANESTHESIA: Mod: ANES,NTX,, | Performed by: ANESTHESIOLOGY

## 2023-10-02 PROCEDURE — 87305 ASPERGILLUS AG IA: CPT

## 2023-10-02 PROCEDURE — 87206 SMEAR FLUORESCENT/ACID STAI: CPT | Mod: TXP | Performed by: INTERNAL MEDICINE

## 2023-10-02 PROCEDURE — 37000008 HC ANESTHESIA 1ST 15 MINUTES: Mod: TXP | Performed by: INTERNAL MEDICINE

## 2023-10-02 PROCEDURE — 71000015 HC POSTOP RECOV 1ST HR: Mod: TXP | Performed by: INTERNAL MEDICINE

## 2023-10-02 PROCEDURE — D9220A PRA ANESTHESIA: ICD-10-PCS | Mod: ANES,NTX,, | Performed by: ANESTHESIOLOGY

## 2023-10-02 PROCEDURE — 25000003 PHARM REV CODE 250: Mod: NTX | Performed by: NURSE ANESTHETIST, CERTIFIED REGISTERED

## 2023-10-02 PROCEDURE — 87798 DETECT AGENT NOS DNA AMP: CPT | Mod: 59

## 2023-10-02 PROCEDURE — 36000706: Mod: TXP | Performed by: INTERNAL MEDICINE

## 2023-10-02 PROCEDURE — D9220A PRA ANESTHESIA: Mod: CRNA,NTX,, | Performed by: NURSE ANESTHETIST, CERTIFIED REGISTERED

## 2023-10-02 PROCEDURE — 87070 CULTURE OTHR SPECIMN AEROBIC: CPT | Mod: TXP | Performed by: INTERNAL MEDICINE

## 2023-10-02 PROCEDURE — D9220A PRA ANESTHESIA: ICD-10-PCS | Mod: CRNA,NTX,, | Performed by: NURSE ANESTHETIST, CERTIFIED REGISTERED

## 2023-10-02 PROCEDURE — 36000707: Mod: NTX | Performed by: INTERNAL MEDICINE

## 2023-10-02 PROCEDURE — C1726 CATH, BAL DIL, NON-VASCULAR: HCPCS | Mod: TXP | Performed by: INTERNAL MEDICINE

## 2023-10-02 PROCEDURE — 37000009 HC ANESTHESIA EA ADD 15 MINS: Mod: TXP | Performed by: INTERNAL MEDICINE

## 2023-10-02 PROCEDURE — 87798 DETECT AGENT NOS DNA AMP: CPT | Mod: 59 | Performed by: INTERNAL MEDICINE

## 2023-10-02 PROCEDURE — 87799 DETECT AGENT NOS DNA QUANT: CPT | Mod: 59

## 2023-10-02 PROCEDURE — 87541 LEGION PNEUMO DNA AMP PROB: CPT | Mod: TXP | Performed by: INTERNAL MEDICINE

## 2023-10-02 PROCEDURE — 87205 SMEAR GRAM STAIN: CPT | Mod: NTX | Performed by: INTERNAL MEDICINE

## 2023-10-02 PROCEDURE — 87116 MYCOBACTERIA CULTURE: CPT | Mod: NTX | Performed by: INTERNAL MEDICINE

## 2023-10-02 PROCEDURE — 71000044 HC DOSC ROUTINE RECOVERY FIRST HOUR: Mod: TXP | Performed by: INTERNAL MEDICINE

## 2023-10-02 PROCEDURE — 87102 FUNGUS ISOLATION CULTURE: CPT | Mod: NTX | Performed by: INTERNAL MEDICINE

## 2023-10-02 RX ORDER — FENTANYL CITRATE 50 UG/ML
25 INJECTION, SOLUTION INTRAMUSCULAR; INTRAVENOUS EVERY 5 MIN PRN
Status: ACTIVE | OUTPATIENT
Start: 2023-10-02

## 2023-10-02 RX ORDER — HALOPERIDOL 5 MG/ML
0.5 INJECTION INTRAMUSCULAR EVERY 10 MIN PRN
Status: ACTIVE | OUTPATIENT
Start: 2023-10-02

## 2023-10-02 RX ORDER — LIDOCAINE HYDROCHLORIDE 10 MG/ML
INJECTION, SOLUTION EPIDURAL; INFILTRATION; INTRACAUDAL; PERINEURAL
Status: DISCONTINUED | OUTPATIENT
Start: 2023-10-02 | End: 2023-10-02 | Stop reason: HOSPADM

## 2023-10-02 RX ORDER — FENTANYL CITRATE 50 UG/ML
INJECTION, SOLUTION INTRAMUSCULAR; INTRAVENOUS
Status: DISCONTINUED | OUTPATIENT
Start: 2023-10-02 | End: 2023-10-02

## 2023-10-02 RX ORDER — SODIUM CHLORIDE 0.9 % (FLUSH) 0.9 %
10 SYRINGE (ML) INJECTION
Status: ACTIVE | OUTPATIENT
Start: 2023-10-02

## 2023-10-02 RX ORDER — LIDOCAINE HYDROCHLORIDE 20 MG/ML
INJECTION, SOLUTION EPIDURAL; INFILTRATION; INTRACAUDAL; PERINEURAL
Status: DISCONTINUED | OUTPATIENT
Start: 2023-10-02 | End: 2023-10-02

## 2023-10-02 RX ORDER — DEXAMETHASONE SODIUM PHOSPHATE 4 MG/ML
INJECTION, SOLUTION INTRA-ARTICULAR; INTRALESIONAL; INTRAMUSCULAR; INTRAVENOUS; SOFT TISSUE
Status: DISCONTINUED | OUTPATIENT
Start: 2023-10-02 | End: 2023-10-02

## 2023-10-02 RX ORDER — ONDANSETRON 2 MG/ML
INJECTION INTRAMUSCULAR; INTRAVENOUS
Status: DISCONTINUED | OUTPATIENT
Start: 2023-10-02 | End: 2023-10-02

## 2023-10-02 RX ORDER — PROPOFOL 10 MG/ML
VIAL (ML) INTRAVENOUS
Status: DISCONTINUED | OUTPATIENT
Start: 2023-10-02 | End: 2023-10-02

## 2023-10-02 RX ORDER — DEXMEDETOMIDINE HYDROCHLORIDE 100 UG/ML
INJECTION, SOLUTION INTRAVENOUS
Status: DISCONTINUED | OUTPATIENT
Start: 2023-10-02 | End: 2023-10-02

## 2023-10-02 RX ADMIN — PROPOFOL 150 MG: 10 INJECTION, EMULSION INTRAVENOUS at 12:10

## 2023-10-02 RX ADMIN — FENTANYL CITRATE 25 MCG: 50 INJECTION, SOLUTION INTRAMUSCULAR; INTRAVENOUS at 12:10

## 2023-10-02 RX ADMIN — LIDOCAINE HYDROCHLORIDE 100 MG: 20 INJECTION, SOLUTION EPIDURAL; INFILTRATION; INTRACAUDAL; PERINEURAL at 12:10

## 2023-10-02 RX ADMIN — DEXMEDETOMIDINE 8 MCG: 100 INJECTION, SOLUTION, CONCENTRATE INTRAVENOUS at 12:10

## 2023-10-02 RX ADMIN — ONDANSETRON 4 MG: 2 INJECTION INTRAMUSCULAR; INTRAVENOUS at 12:10

## 2023-10-02 RX ADMIN — DEXAMETHASONE SODIUM PHOSPHATE 4 MG: 4 INJECTION, SOLUTION INTRAMUSCULAR; INTRAVENOUS at 12:10

## 2023-10-02 RX ADMIN — SODIUM CHLORIDE: 0.9 INJECTION, SOLUTION INTRAVENOUS at 11:10

## 2023-10-02 NOTE — DISCHARGE SUMMARY
[facility] (2nd Fl)  Lung Transplant  Discharge Summary      Patient Name: Yamel Shah  MRN: [id number]  Admission Date: 10/2/2023  Hospital Length of Stay: 0 days  Discharge Date and Time: 10/02/2023 12:28 PM  Attending Physician: Roberta Leigh DO   Discharging Provider: Roberta Leigh DO  Primary Care Provider: Aly Rand MD     HPI: No notes on file    Procedure(s) (LRB):  bronch (N/A)     Hospital Course: Underwent successful bronchoscopy with airway inspection and BAL      Goals of Care Treatment Preferences:  Code Status: Full Code          Significant Diagnostic Studies: Bronchoscopy: see full bronch report    Pending Diagnostic Studies:     Procedure Component Value Units Date/Time    Aspergillus Antigen, BAL [[id number]] Collected: 10/02/23 1214    Order Status: Sent Lab Status: In process Updated: 10/02/23 1214    Specimen: Body Fluid from Bronchial Alveolar Lavage (BAL)     Misc Sendout Test, Non-Blood Viracor [id number] [[id number]] Collected: 10/02/23 1214    Order Status: Sent Lab Status: In process Updated: 10/02/23 1214    Pepsin A BAL (BAL RML) [[id number]] Collected: 10/02/23 1214    Order Status: Sent Lab Status: In process Updated: 10/02/23 1214    RESPIRATORY PATHOGENS PANEL (TEM-PCR) Bronchial Alveolar Lavage [[id number]] Collected: 10/02/23 1214    Order Status: Sent Lab Status: In process Updated: 10/02/23 1214    WBC & Diff,Body Fluid Bronchial Wash (BAL RML) [[id number]] Collected: 10/02/23 1214    Order Status: Sent Lab Status: In process Updated: 10/02/23 1214        There are no hospital problems to display for this patient.     Discharged Condition: good    Disposition:     Medications:  Reconciled Home Medications:      Medication List      CHANGE how you take these medications    * NIFEdipine 90 MG Tbsr  Commonly known as: ADALAT CC  TAKE 1 TABLET(90 MG) BY MOUTH EVERY DAY  What changed:   · how much to take  · when to take this  · additional  instructions     * NIFEdipine 30 MG (OSM) 24 hr tablet  Commonly known as: PROCARDIA-XL  TAKE 1 TABLET(30 MG) BY MOUTH EVERY DAY  What changed:   · how much to take  · when to take this  · additional instructions     tadalafil 20 mg Tab  Commonly known as: ADCIRCA  Take 2 tablets (40 mg total) by mouth once daily.  What changed: when to take this         * This list has 2 medication(s) that are the same as other medications prescribed for you. Read the directions carefully, and ask your doctor or other care provider to review them with you.            CONTINUE taking these medications    * albuterol 90 mcg/actuation inhaler  Commonly known as: VENTOLIN HFA  Inhale 2 puffs into the lungs every 4 (four) hours as needed for Wheezing. Rescue     * albuterol 2.5 mg /3 mL (0.083 %) nebulizer solution  Commonly known as: PROVENTIL  Take 3 mLs (2.5 mg total) by nebulization every 4 (four) hours as needed for Wheezing or Shortness of Breath (or cough). Rescue     carboxymethylcellulose sodium 0.5 % Drop  Commonly known as: REFRESH TEARS  Apply 1-2 drops to every as needed     COMP-AIR NEBULIZER COMPRESSOR Rosemary  Generic drug: nebulizer and compressor  Use as directed     ergocalciferol 50,000 unit Cap  Commonly known as: ERGOCALCIFEROL  TAKE 1 CAPSULE BY MOUTH EVERY 7 DAYS     levoFLOXacin 750 MG tablet  Commonly known as: LEVAQUIN  Take 1 tablet (750 mg total) by mouth once daily.     multivitamin capsule  Take 1 capsule by mouth once daily.     mycophenolate mofetil 200 mg/mL Susr  Commonly known as: CELLCEPT  Take 5 mLs (1,000 mg total) by mouth 2 (two) times daily.     pravastatin 20 MG tablet  Commonly known as: PRAVACHOL  Take 1 tablet (20 mg total) by mouth every evening.     pregabalin 300 MG Cap  Commonly known as: LYRICA  Take 1 capsule (300 mg total) by mouth 2 (two) times daily.     PREVIDENT 5000 BOOSTER PLUS 1.1 % Pste  Generic drug: fluoride (sodium)  SMARTSIG:To Teeth     PREVIDENT 5000 SENSITIVE 1.1-5 %  Pste  Generic drug: sodium fluoride-pot nitrate  BRUSH FOR 2 MINUTES TWICE PER DAY     RABEprazole 20 mg tablet  Commonly known as: ACIPHEX  Take 1 tablet (20 mg total) by mouth 2 (two) times daily.     spironolactone 25 MG tablet  Commonly known as: ALDACTONE  Take 1 tablet (25 mg total) by mouth once daily.     tiZANidine 4 MG tablet  Commonly known as: ZANAFLEX  Take 1 tablet (4 mg total) by mouth nightly as needed (muscle pain).         * This list has 2 medication(s) that are the same as other medications prescribed for you. Read the directions carefully, and ask your doctor or other care provider to review them with you.              Patient Instructions:      Diet general     Call MD for:  temperature >101     Call MD for:  coughing up blood greater than 3 tablespoons in volume     Call MD for:  chest pain     Call MD for:  difficulty breathing or shortness of breath     Call MD for:  development of yellow/green sputum     Activity as tolerated     Follow Up:   Follow-up Information     Roberta Leigh, DO Follow up in 2 week(s).    Specialties: Transplant, Pulmonary Disease, Critical Care Medicine  Contact information:  [address]  [facility] [address]  [phone]                         Roberta Leigh, DO  Lung Transplant  [facility] - [facility] (2nd Fl)

## 2023-10-02 NOTE — HOSPITAL COURSE
Underwent successful bronchoscopy with airway inspection and BAL  
probably aspiration possibly strep pneumo

## 2023-10-02 NOTE — ANESTHESIA PROCEDURE NOTES
Intubation    Date/Time: 10/2/2023 12:04 PM    Performed by: Mckenna Montilla CRNA  Authorized by: Lilia Love MD    Intubation:     Induction:  Intravenous    Intubated:  Postinduction    Mask Ventilation:  N/a    Attempts:  1    Attempted By:  Student    Difficult Airway Encountered?: No      Complications:  None    Airway Device:  Supraglottic airway/LMA (I gel)    Airway Device Size:  4.0    Secured at:  The lips    Placement Verified By:  Capnometry    Findings Post-Intubation:  BS equal bilateral and atraumatic/condition of teeth unchanged

## 2023-10-02 NOTE — TRANSFER OF CARE
"Anesthesia Transfer of Care Note    Patient: Yamel Shah    Procedure(s) Performed: Procedure(s) (LRB):  bronch (N/A)    Patient location: PACU    Anesthesia Type: general    Transport from OR: Transported from OR on 6-10 L/min O2 by face mask with adequate spontaneous ventilation    Post pain: adequate analgesia    Post assessment: tolerated procedure well and no apparent anesthetic complications    Post vital signs: stable    Level of consciousness: awake    Nausea/Vomiting: no nausea/vomiting    Complications: none    Transfer of care protocol was followed      Last vitals:   Visit Vitals  BP (!) 124/58 (BP Location: Left arm, Patient Position: Lying)   Pulse 76   Temp 36.4 °C (97.5 °F) (Oral)   Resp 20   Ht 5' 5" (1.651 m)   Wt 67.6 kg (149 lb)   SpO2 100%   Breastfeeding No   BMI 24.79 kg/m²     "

## 2023-10-02 NOTE — PLAN OF CARE
Pt is AAOX4, VSS, No S/S of acute distress. Denies pain, denies N/V. Tolerates PO fluids wihtout difficuklty. Pt coughs have decreased but pt is still currently coughinfg up blood tinged sputum. Pt denies chest pain or shortness of breath.

## 2023-10-03 DIAGNOSIS — M34.9 SCLERODERMA: ICD-10-CM

## 2023-10-03 RX ORDER — MYCOPHENOLATE MOFETIL 200 MG/ML
1000 POWDER, FOR SUSPENSION ORAL 2 TIMES DAILY
Qty: 480 ML | Refills: 1 | Status: ACTIVE | OUTPATIENT
Start: 2023-10-03 | End: 2024-02-02 | Stop reason: SDUPTHER

## 2023-10-03 RX ORDER — NIFEDIPINE 30 MG/1
TABLET, EXTENDED RELEASE ORAL
Qty: 30 TABLET | Refills: 11 | Status: SHIPPED | OUTPATIENT
Start: 2023-10-03

## 2023-10-04 LAB
ENTEROVIRUS/RHINOVIRUS: NOT DETECTED
GALACTOMANNAN AG SPEC-ACNC: <0.5 INDEX
HUMAN BOCAVIRUS: NOT DETECTED
HUMAN CORONAVIRUS, COMMON COLD VIRUS: NOT DETECTED
INFLUENZA A - H1N1-09: NOT DETECTED
LEGIONELLA PNEUMOPHILA: NOT DETECTED
MISCELLANEOUS TEST NAME: NORMAL
MORAXELLA CATARRHALIS: NOT DETECTED
PARAINFLUENZA: NOT DETECTED
REFERENCE LAB: NORMAL
RVP - ADENOVIRUS: NOT DETECTED
RVP - HUMAN METAPNEUMOVIRUS (HMPV): NOT DETECTED
RVP - INFLUENZA A: NOT DETECTED
RVP - INFLUENZA B: NOT DETECTED
RVP - RESPIRATORY SYNCTIAL VIRUS (RSV) A: NOT DETECTED
RVP - RESPIRATORY VIRAL PANEL, SOURCE: NORMAL
SPECIMEN TYPE: NORMAL
TEM - ACINETOBACTER BAUMANNII: NOT DETECTED
TEM - BORDETELLA PERTUSSIS: NOT DETECTED
TEM - CHLAMYDOPHILA PNEUMONIAE: NOT DETECTED
TEM - KLEBSIELLA PNEUMONIAE: NOT DETECTED
TEM - MRSA: NOT DETECTED
TEM - MYCOPLASMA PNEUMONIAE: NOT DETECTED
TEM - NEISSERIA MENINGITIDIS: NOT DETECTED
TEM - PANTON-VALENTINE: NOT DETECTED
TEM - PSEUDOMONAS AERUGINOSA: NOT DETECTED
TEM - STAPHYLOCOCCUS AUREUS: NOT DETECTED
TEM - STREPTOCOCCUS PNEUMONIAE: NOT DETECTED
TEM - STREPTOCOCCUS PYOGENES A: NOT DETECTED
TEM- HAEMOPHILUS INFLUENZAE B: NOT DETECTED
TEM- HAEMOPHILUS INFLUENZAE: NOT DETECTED
TEST RESULT: NORMAL

## 2023-10-04 NOTE — ANESTHESIA RELEASE NOTE
"Anesthesia Release from PACU Note    Patient: Yamel Shah    Procedure(s) Performed: Procedure(s) (LRB):  bronch (N/A)    Anesthesia type: general    Post pain: Adequate analgesia    Post assessment: no apparent anesthetic complications and tolerated procedure well    Last Vitals:   Visit Vitals  /62 (BP Location: Right arm, Patient Position: Sitting)   Pulse 64   Temp 36.7 °C (98 °F) (Temporal)   Resp 20   Ht 5' 5" (1.651 m)   Wt 67.6 kg (149 lb)   SpO2 97%   Breastfeeding No   BMI 24.79 kg/m²       Post vital signs: stable    Level of consciousness: awake and alert     Nausea/Vomiting: no nausea/no vomiting    Complications: none    Airway Patency: patent    Respiratory: unassisted, spontaneous ventilation, room air    Cardiovascular: stable and blood pressure at baseline    Hydration: euvolemic  "

## 2023-10-04 NOTE — ANESTHESIA POSTPROCEDURE EVALUATION
Anesthesia Post Evaluation    Patient: Yamel Shah    Procedure(s) Performed: Procedure(s) (LRB):  bronch (N/A)    Final Anesthesia Type: general      Patient location during evaluation: PACU  Patient participation: Yes- Able to Participate  Level of consciousness: awake and alert  Post-procedure vital signs: reviewed and stable  Pain management: adequate  Airway patency: patent    PONV status at discharge: No PONV  Anesthetic complications: no      Cardiovascular status: hemodynamically stable  Respiratory status: unassisted, spontaneous ventilation and room air  Hydration status: euvolemic  Follow-up not needed.          Vitals Value Taken Time   /62 10/02/23 1345   Temp 36.4 °C (97.5 °F) 10/02/23 1230   Pulse 64 10/02/23 1345   Resp 20 10/02/23 1345   SpO2 97 % 10/02/23 1345         No case tracking events are documented in the log.      Pain/Lory Score: No data recorded

## 2023-10-05 ENCOUNTER — TELEPHONE (OUTPATIENT)
Dept: TRANSPLANT | Facility: CLINIC | Age: 73
End: 2023-10-05
Payer: MEDICARE

## 2023-10-05 DIAGNOSIS — M34.9 SCLERODERMA WITH PULMONARY INVOLVEMENT: ICD-10-CM

## 2023-10-05 DIAGNOSIS — J84.9 ILD (INTERSTITIAL LUNG DISEASE): Primary | ICD-10-CM

## 2023-10-05 NOTE — PROGRESS NOTES
Per IVA Leigh, patient RTC November 2023 with PFTs, 6MWT. Start on room air, modified. Request to .

## 2023-10-05 NOTE — TELEPHONE ENCOUNTER
----- Message from Roberta Leigh DO sent at 10/5/2023 10:41 AM CDT -----  Normal piedad.  Nothing to do.  Thanks    ----- Message -----  From: Johanna Fernandez  Sent: 10/5/2023   9:19 AM CDT  To: Roberta Leigh, DO    Please advise if any action needed.

## 2023-10-06 LAB
BACTERIA SPT CF RESP CULT: NO GROWTH
GRAM STN SPEC: NORMAL
GRAM STN SPEC: NORMAL

## 2023-10-11 LAB
PEPSIN A INTERPRETATION: NORMAL
PEPSIN A PROTEIN: 0.04 MG/ML
PEPSIN A SPECIMEN PH: 5
PEPSIN A SPECIMEN SOURCE: NORMAL
PEPSIN A UNITS: NORMAL NG/ML

## 2023-10-19 ENCOUNTER — LAB VISIT (OUTPATIENT)
Dept: LAB | Facility: HOSPITAL | Age: 73
End: 2023-10-19
Attending: INTERNAL MEDICINE
Payer: MEDICARE

## 2023-10-19 DIAGNOSIS — E55.9 VITAMIN D DEFICIENCY: ICD-10-CM

## 2023-10-19 DIAGNOSIS — D50.9 IRON DEFICIENCY ANEMIA, UNSPECIFIED IRON DEFICIENCY ANEMIA TYPE: ICD-10-CM

## 2023-10-19 DIAGNOSIS — I10 ESSENTIAL HYPERTENSION: ICD-10-CM

## 2023-10-19 DIAGNOSIS — R79.9 ABNORMAL FINDING OF BLOOD CHEMISTRY, UNSPECIFIED: ICD-10-CM

## 2023-10-19 LAB
25(OH)D3+25(OH)D2 SERPL-MCNC: 38 NG/ML (ref 30–96)
ALBUMIN SERPL BCP-MCNC: 3.4 G/DL (ref 3.5–5.2)
ALP SERPL-CCNC: 99 U/L (ref 55–135)
ALT SERPL W/O P-5'-P-CCNC: 9 U/L (ref 10–44)
ANION GAP SERPL CALC-SCNC: 10 MMOL/L (ref 8–16)
AST SERPL-CCNC: 20 U/L (ref 10–40)
BASOPHILS # BLD AUTO: 0.02 K/UL (ref 0–0.2)
BASOPHILS NFR BLD: 0.5 % (ref 0–1.9)
BILIRUB SERPL-MCNC: 0.3 MG/DL (ref 0.1–1)
BUN SERPL-MCNC: 18 MG/DL (ref 8–23)
CALCIUM SERPL-MCNC: 9.2 MG/DL (ref 8.7–10.5)
CHLORIDE SERPL-SCNC: 111 MMOL/L (ref 95–110)
CHOLEST SERPL-MCNC: 129 MG/DL (ref 120–199)
CHOLEST/HDLC SERPL: 2.7 {RATIO} (ref 2–5)
CO2 SERPL-SCNC: 20 MMOL/L (ref 23–29)
CREAT SERPL-MCNC: 0.7 MG/DL (ref 0.5–1.4)
DIFFERENTIAL METHOD: ABNORMAL
EOSINOPHIL # BLD AUTO: 0.1 K/UL (ref 0–0.5)
EOSINOPHIL NFR BLD: 2.6 % (ref 0–8)
ERYTHROCYTE [DISTWIDTH] IN BLOOD BY AUTOMATED COUNT: 15.5 % (ref 11.5–14.5)
EST. GFR  (NO RACE VARIABLE): >60 ML/MIN/1.73 M^2
ESTIMATED AVG GLUCOSE: 103 MG/DL (ref 68–131)
GLUCOSE SERPL-MCNC: 84 MG/DL (ref 70–110)
HBA1C MFR BLD: 5.2 % (ref 4–5.6)
HCT VFR BLD AUTO: 33 % (ref 37–48.5)
HDLC SERPL-MCNC: 47 MG/DL (ref 40–75)
HDLC SERPL: 36.4 % (ref 20–50)
HGB BLD-MCNC: 11.2 G/DL (ref 12–16)
IMM GRANULOCYTES # BLD AUTO: 0.01 K/UL (ref 0–0.04)
IMM GRANULOCYTES NFR BLD AUTO: 0.3 % (ref 0–0.5)
LDLC SERPL CALC-MCNC: 71.6 MG/DL (ref 63–159)
LYMPHOCYTES # BLD AUTO: 0.9 K/UL (ref 1–4.8)
LYMPHOCYTES NFR BLD: 23.7 % (ref 18–48)
MCH RBC QN AUTO: 32.1 PG (ref 27–31)
MCHC RBC AUTO-ENTMCNC: 33.9 G/DL (ref 32–36)
MCV RBC AUTO: 95 FL (ref 82–98)
MONOCYTES # BLD AUTO: 0.4 K/UL (ref 0.3–1)
MONOCYTES NFR BLD: 11.1 % (ref 4–15)
NEUTROPHILS # BLD AUTO: 2.4 K/UL (ref 1.8–7.7)
NEUTROPHILS NFR BLD: 61.8 % (ref 38–73)
NONHDLC SERPL-MCNC: 82 MG/DL
NRBC BLD-RTO: 0 /100 WBC
PLATELET # BLD AUTO: 183 K/UL (ref 150–450)
PMV BLD AUTO: 12.8 FL (ref 9.2–12.9)
POTASSIUM SERPL-SCNC: 3.9 MMOL/L (ref 3.5–5.1)
PROT SERPL-MCNC: 8.4 G/DL (ref 6–8.4)
RBC # BLD AUTO: 3.49 M/UL (ref 4–5.4)
SODIUM SERPL-SCNC: 141 MMOL/L (ref 136–145)
TRIGL SERPL-MCNC: 52 MG/DL (ref 30–150)
TSH SERPL DL<=0.005 MIU/L-ACNC: 1.46 UIU/ML (ref 0.4–4)
WBC # BLD AUTO: 3.8 K/UL (ref 3.9–12.7)

## 2023-10-19 PROCEDURE — 83036 HEMOGLOBIN GLYCOSYLATED A1C: CPT | Mod: NTX | Performed by: INTERNAL MEDICINE

## 2023-10-19 PROCEDURE — 85025 COMPLETE CBC W/AUTO DIFF WBC: CPT | Mod: TXP | Performed by: INTERNAL MEDICINE

## 2023-10-19 PROCEDURE — 82306 VITAMIN D 25 HYDROXY: CPT | Mod: TXP | Performed by: INTERNAL MEDICINE

## 2023-10-19 PROCEDURE — 80061 LIPID PANEL: CPT | Mod: NTX | Performed by: INTERNAL MEDICINE

## 2023-10-19 PROCEDURE — 84443 ASSAY THYROID STIM HORMONE: CPT | Mod: NTX | Performed by: INTERNAL MEDICINE

## 2023-10-19 PROCEDURE — 36415 COLL VENOUS BLD VENIPUNCTURE: CPT | Mod: PO,NTX | Performed by: INTERNAL MEDICINE

## 2023-10-19 PROCEDURE — 80053 COMPREHEN METABOLIC PANEL: CPT | Mod: TXP | Performed by: INTERNAL MEDICINE

## 2023-10-20 NOTE — PROGRESS NOTES
Ms. Shah had her recheck of CMP r/t spironolactone 25 mg addition on 9/18/2023.  Pulmonary Hypertension Nurse Navigator attempted to contact Yamel Shah to check in. Left message for patient to call NN back.

## 2023-10-22 NOTE — PROGRESS NOTES
Subjective:      Patient ID: Yamel Shah is a 72 y.o. female.    Chief Complaint: follow up for scleroderma     Yamel Shah is a 73yo F with PMH of systemic sclerosis with interstitial lung disease, HFpEF, BLE venous insufficiency with chronic ulcers/wounds, s/p laser venous ablation (12/2020), GERD, Apgan's esophagus, s/p hernia repair (5/2023).    Rheum History:  - SSc with ILD dx in 1983  - Serologies: +SSA, +Scl-70  - Previously followed with podiatry and vascular surgery for the venous insufficiency and wounds, but now just cares for them herself  - Has chronically elevated sed rate and CRP  - RHC/LHC (3/2023) with normal pressures   - Follows with pulmonology Dr. Leigh, who was considering initiation of anti fibrotic therapy  - Follows with cardiology, last saw Claire Pelaez NP (9/2023)  - Recently dx with covid while on a cruise on 8/28, was hospitalized in [place] for supportive care  - 6MW, PFTs, TTE completed in 9/2023 post covid, will be repeating on 11/29/23    Current Treatments:  - Mycophenolate mofetil liquid suspension 1000mg BID (2019-current)    Interval History:  Pt is here for follow up today and is accompanied by her . They were recently on an [facility] cruise to celebrate their 50th wedding anniversary. However, while on the cruise, pt contracted covid. She had significant nausea, vomiting, malaise, and generalized symptoms. Also had some cough and dyspnea. She ended up getting hospitalized for supportive care in [place], [place] for a couple of days. Tested positive on 8/28. After returning to [place], pt had seen Dr. Leigh, who performed a bronchoscopy on 10/2. Cultures were negative. Pt had completed 2wks of levaquin. Since then, she has continued to improve and now feels mostly back to her baseline. Pt reports breathing comfortably; no significant dyspnea on exertion, cough, or sputum production anymore. She received her covid vaccine on 10/18 and has  "not yet resumed her mmf (held since start of covid infection in end of August).     Pt denies any new skin changes, she feels her chronic skin changes have remained stable. Ulcers on the feet/ankles are stable and she cares for them herself at home.     Review of Systems   Constitutional:  Negative for activity change, appetite change, chills, fatigue, fever and unexpected weight change.   HENT:  Positive for trouble swallowing. Negative for congestion, dental problem, facial swelling, mouth sores, nosebleeds, sinus pain and sore throat.    Eyes:  Negative for photophobia, discharge, redness and visual disturbance.   Respiratory:  Negative for cough, chest tightness and shortness of breath.    Cardiovascular:  Negative for chest pain and leg swelling.   Gastrointestinal:  Negative for abdominal pain, constipation, diarrhea, nausea and vomiting.   Endocrine: Negative for cold intolerance and heat intolerance.   Genitourinary:  Negative for dysuria, frequency, genital sores, hematuria and urgency.   Musculoskeletal:  Negative for arthralgias, back pain, joint swelling and myalgias.   Skin:  Negative for color change, rash and wound.   Neurological:  Negative for dizziness, tremors, syncope, weakness, light-headedness and headaches.   Hematological:  Negative for adenopathy. Does not bruise/bleed easily.   Psychiatric/Behavioral:  Negative for agitation, confusion and sleep disturbance. The patient is not nervous/anxious.       Objective:   /66   Pulse 77   Ht 5' 5" (1.651 m)   Wt 70.3 kg (154 lb 15.7 oz)   BMI 25.79 kg/m²   Physical Exam   Constitutional: She is oriented to person, place, and time. She appears well-developed and well-nourished. No distress.   HENT:   Head: Normocephalic and atraumatic.   Right Ear: External ear normal.   Left Ear: External ear normal.   Nose: Nose normal. No nasal congestion.   Mouth/Throat: Mucous membranes are moist. Oropharynx is clear.   Eyes: Conjunctivae are normal. "   Cardiovascular: Normal rate, regular rhythm and normal heart sounds.   No murmur heard.  Pulmonary/Chest: Effort normal. No respiratory distress. She has wheezes (mild inspiratory wheezes). She has no rhonchi.   Pulmonary Comments: Velcro crackles present primarily at the lung bases  Abdominal: Soft. She exhibits no distension. There is no abdominal tenderness.   Musculoskeletal:      Cervical back: Normal range of motion and neck supple.      Comments: No synovitis in any of the small or large joints. There is limited ROM of the hands and feet in setting of chronic scleroderma skin changes.    Neurological: She is alert and oriented to person, place, and time.   Skin: Skin is warm and dry.   MRSS 20. There is sclerodactyly with chronic skin thickened changes. Fingertips are dry. No pitting ulcers. Bilateral feet/ankles with chronic skin thickening and ulcer changes. No significant warmth, erythema, purulence.   Psychiatric: Her behavior is normal. Mood normal.   Vitals reviewed.    Modified Skin Score:    Face= 2  Chest= 0  Abdomen=0         Right Upper Arm= 2     Left Upper Arm= 2  Right Lower Arm= 0     Left Lower Arm= 0  Right Hand= 1              Left Hand= 0  Right Fingers= 3          Left Fingers= 2  Right Upper Leg= 0     Left Upper Leg=0  Right Lower Leg= 1     Left Lower Leg= 1  Right Foot= 3               Left Foot= 3     TOTAL: 20     Assessment:     1. Scleroderma with pulmonary involvement    2. Immunosuppression due to drug therapy    3. Skin ulcers of both feet    4. PVD (peripheral vascular disease)    5. ILD (interstitial lung disease)    6. Pulmonary hypertension associated with systemic disorder    7. Venous insufficiency of both lower extremities    8. Raynaud's disease without gangrene      - Pt with established SSc with ILD for many years  - Overall, disease has remained stable most recently  - However, she recently had the acute covid infection which caused some transient changes  -  Hopefully, pt will continue to return to her prior baseline and remain stable  - Prior to this infectious issue, lung function and chronic skin changes had been stable  - She follows regularly with pulmonology, Dr. Leigh, who will determine if she requires nintedanib    Plan:     Problem List Items Addressed This Visit          Pulmonary    ILD (interstitial lung disease)       Cardiac/Vascular    Pulmonary hypertension associated with systemic disorder (Chronic)    Venous insufficiency of both lower extremities    Raynaud's disease without gangrene    PVD (peripheral vascular disease)       Immunology/Multi System    Scleroderma with pulmonary involvement - Primary    Relevant Orders    C-Reactive Protein    Sedimentation rate    Comprehensive Metabolic Panel    CBC Auto Differential    Urinalysis    Protein/Creatinine Ratio, Urine    Immunosuppression due to drug therapy    Relevant Orders    C-Reactive Protein    Sedimentation rate    Comprehensive Metabolic Panel    CBC Auto Differential    Urinalysis    Protein/Creatinine Ratio, Urine       Orthopedic    Skin ulcers of both feet     - Resume MMF this Wednesday (10/25/23), which would be 1 week after receiving covid vaccine   - Labs in about 1 month  - Pt will be doing PFTs on 11/29/23 and will see Dr. Leigh then as well  - If pulm sees significant change/progression in ILD, they may initiate anti fibrotic therapy  - At this time, pt does not appear to require additional immunosuppressive agents  - If she does need further immune therapy in the future, could consider tocilizumab or rituximab    Follow up here in clinic in 2 months or earlier if needed.    Assessment and plan discussed with supervising attending, Dr. Ahuja.      Saima Bennett MD  PGY-4, Rheumatology

## 2023-10-23 ENCOUNTER — OFFICE VISIT (OUTPATIENT)
Dept: RHEUMATOLOGY | Facility: CLINIC | Age: 73
End: 2023-10-23
Payer: MEDICARE

## 2023-10-23 VITALS
SYSTOLIC BLOOD PRESSURE: 138 MMHG | HEIGHT: 65 IN | WEIGHT: 155 LBS | DIASTOLIC BLOOD PRESSURE: 66 MMHG | HEART RATE: 77 BPM | BODY MASS INDEX: 25.83 KG/M2

## 2023-10-23 DIAGNOSIS — I87.2 VENOUS INSUFFICIENCY OF BOTH LOWER EXTREMITIES: ICD-10-CM

## 2023-10-23 DIAGNOSIS — M34.9 SCLERODERMA WITH PULMONARY INVOLVEMENT: Primary | ICD-10-CM

## 2023-10-23 DIAGNOSIS — I73.9 PVD (PERIPHERAL VASCULAR DISEASE): ICD-10-CM

## 2023-10-23 DIAGNOSIS — I73.00 RAYNAUD'S DISEASE WITHOUT GANGRENE: ICD-10-CM

## 2023-10-23 DIAGNOSIS — I27.29 PULMONARY HYPERTENSION ASSOCIATED WITH SYSTEMIC DISORDER: Chronic | ICD-10-CM

## 2023-10-23 DIAGNOSIS — J84.9 ILD (INTERSTITIAL LUNG DISEASE): ICD-10-CM

## 2023-10-23 DIAGNOSIS — L97.529 SKIN ULCERS OF BOTH FEET: ICD-10-CM

## 2023-10-23 DIAGNOSIS — D84.821 IMMUNOSUPPRESSION DUE TO DRUG THERAPY: ICD-10-CM

## 2023-10-23 DIAGNOSIS — Z79.899 IMMUNOSUPPRESSION DUE TO DRUG THERAPY: ICD-10-CM

## 2023-10-23 DIAGNOSIS — L97.519 SKIN ULCERS OF BOTH FEET: ICD-10-CM

## 2023-10-23 PROCEDURE — 99999 PR PBB SHADOW E&M-EST. PATIENT-LVL IV: ICD-10-PCS | Mod: PBBFAC,GC,TXP, | Performed by: STUDENT IN AN ORGANIZED HEALTH CARE EDUCATION/TRAINING PROGRAM

## 2023-10-23 PROCEDURE — 99214 OFFICE O/P EST MOD 30 MIN: CPT | Mod: S$PBB,GC,NTX, | Performed by: STUDENT IN AN ORGANIZED HEALTH CARE EDUCATION/TRAINING PROGRAM

## 2023-10-23 PROCEDURE — 99999 PR PBB SHADOW E&M-EST. PATIENT-LVL IV: CPT | Mod: PBBFAC,GC,TXP, | Performed by: STUDENT IN AN ORGANIZED HEALTH CARE EDUCATION/TRAINING PROGRAM

## 2023-10-23 PROCEDURE — 99214 OFFICE O/P EST MOD 30 MIN: CPT | Mod: PBBFAC,NTX | Performed by: STUDENT IN AN ORGANIZED HEALTH CARE EDUCATION/TRAINING PROGRAM

## 2023-10-23 PROCEDURE — 99214 PR OFFICE/OUTPT VISIT, EST, LEVL IV, 30-39 MIN: ICD-10-PCS | Mod: S$PBB,GC,NTX, | Performed by: STUDENT IN AN ORGANIZED HEALTH CARE EDUCATION/TRAINING PROGRAM

## 2023-10-23 NOTE — PROGRESS NOTES
10/22/2023     9:34 AM   Rapid3 Question Responses and Scores   MDHAQ Score 0.9   Psychologic Score 3.3   Pain Score 5   When you awakened in the morning OVER THE LAST WEEK, did you feel stiff? No   Fatigue Score 0.5   Global Health Score 5   RAPID3 Score 4.33     Answers submitted by the patient for this visit:  Rheumatology Questionnaire (Submitted on 10/22/2023)  fever: No  eye redness: No  mouth sores: No  headaches: No  shortness of breath: No  chest pain: No  trouble swallowing: Yes  diarrhea: No  constipation: No  unexpected weight change: No  genital sore: No  dysuria: No  During the last 3 days, have you had a skin rash?: No  Bruises or bleeds easily: No  cough: No

## 2023-10-23 NOTE — PROGRESS NOTES
I have personally reviewed the history, confirmed exam findings, and discussed assessment and plan with fellow.        PFTs       FVC     SVC      RV     DLco    TLC     9/18/23   66.1                  44.2    42.1      52.8  5/22/23   71.1                   68.5   52.3      66.8  10/7/22   67.6                    63.1  68.9      62.7  1/3/22     67.5   5/25/21   67.8                    59.3   64.1      62.4  1/10/20    74                              72  3/12/19    60         64         61      82  3/6/18      64         70         64      112  4/8/16      70         70         89       84  9/4/15      66         62         72       71  1/19/15    66           2/1EXAMINATION:  CT CHEST WITHOUT CONTRAST     CLINICAL HISTORY:  Interstitial lung disease;scleroderma; Interstitial pulmonary disease, unspecified     TECHNIQUE:  Low dose axial images, sagittal and coronal reformations were obtained from the thoracic inlet to the lung bases without IV contrast administration.     COMPARISON:  CT chest 12/22/2020, 03/13/2019     FINDINGS:  Base of Neck: No significant abnormality.     Thoracic soft tissues: Unremarkable.     Aorta: Left-sided three-vessel aortic arch.  No aneurysm.  Mild calcific atherosclerosis of the aortic arch and descending aorta.     Heart: Normal size. No effusion.     Pulmonary vasculature: Unremarkable.     Barbara/Mediastinum: No pathologic noelle enlargement.     Airways: Trachea and bronchi are patent.     Lungs/Pleura:     There is redemonstration of basilar and subpleural predominant reticular opacities, volume loss, bronchiolectasis, and bronchiectasis with architectural distortion.  There is a straight edge sign present due to sharply marginated posterobasilar honeycombing.  There are scattered, subtle foci of ground-glass opacities.  No significant detrimental change when compared to prior exam.     Stable scattered foci of tree-in-bud nodularities, as seen within the superior segment of the  right lower lobe (4-209).     There is a 0.6 cm solid nodule within the left upper lobe (4-173), new when compared to prior exam.  Noted patchy decreased attenuation of the bilateral lungs during expiration, consistent air trapping.  No pleural fluid or pleural thickening.     Esophagus: Mildly dilated esophagus with dependent fluid, similar to prior exam.     Upper Abdomen: No abnormality of the partially imaged upper abdomen.     Bones: Age appropriate degenerative changes of the spine.  No acute fractures or suspicious osseous lesions.     Impression:     1.  Overall stable appearance of chronic interstitial lung disease changes consistent with reported history of scleroderma.  Although some of the features observed can be seen with usual interstitial pneumonia, the relative lack of progression and the presence of ground-glass opacities suggest an alternative diagnosis such as fibrotic nonspecific interstitial pneumonia.     2.  New 0.6 cm pulmonary nodule of the left upper lobe.  Clinical and oncologic considerations will determine the role and schedule for follow-up imaging.     3.  Persistent fluid-filled esophagus, similar when compared to prior and likely related to reported history of scleroderma.  Clinically increased risk of aspiration.     4.  Additional stable findings as above.     Electronically signed by resident: Katerina Soto  Date:                                            06/02/2023  Time:                                           13:43     Electronically signed by: Sunshine Darden  Date:                                            06/02/2023  Time:                                           16:364/14    64                                 87        EXAMINATION:  CT CHEST WITHOUT CONTRAST     CLINICAL HISTORY:  Lung nodule, 6-8mm;3 month follow up solitary pulmonary nodule; Abnormal findings on diagnostic imaging of other specified body structures     TECHNIQUE:  Low dose axial images, sagittal and  coronal reformations were obtained from the thoracic inlet to the lung bases. Contrast was not administered.     All CT scans at this facility use dose modulation, iterative reconstruction and/or weight based dosing when appropriate to reduce rad iation dose to as low as reasonably achievable.     COMPARISON:  12/22/2020, 06/02/2023     FINDINGS:  No intravenous contrast was administered for this examination.  Therefore, it may have diminished sensitivity for detection of certain abnormalities.     Thyroid gland: Within normal limits.     Thoracic soft tissues: Unremarkable.     Mediastinum: No pathologically enlarged lymph nodes.     Cardiovascular: Normal heart size.No pericardial effusion.     Trachea: Within normal limits.     Lungs/pleura/airways:     Redemonstration of basilar and subpleural predominant reticulation and ground-glass with architectural distortion, volume loss, and traction bronchiectasis/bronchiolectasis.  Worsening of patchy areas of ground-glass bilaterally.  There is a straight edge sign present with sharply marginated basilar pseudo honeycombing likely representing clustered dilated bronchiectasis and cystic bronchiectasis.  Anterior upper lobe sign also noted.  These findings are grossly stable compared to CT 12/22/2020.     0.6 cm solid pulmonary nodule in the left upper lobe (4-193), 0.4 cm solid pulmonary nodule left upper lobe (4-212), right upper lobe 0.4 cm solid pulmonary nodule (4-89).  These nodules are unchanged from CT 06/02/2023, but new from CT 12/22/2020..     Esophagus: Esophagus is dilated and fluid-filled to the level of the aortic arch compatible with achalasia in the setting of scleroderma..     Upper abdomen:     Partially imaged.  No significant abnormalities.     Bones: Unremarkable for stated age.     Impression:     Redemonstration of pulmonary fibrosis with mild increased bilateral patchy areas of patchy ground-glass.  Findings favored represent as NSIP patient  with history of scleroderma.     Solid pulmonary nodules measuring up to 0.6 cm as detailed above which are stable from CT 06/02/2023, but new from 12/22/2020.  This can be reassessed in the next follow-up     Stable patulous esophagus.     Electronically signed by resident: Mikala Kruse  Date:                                            09/08/2023  Time:                                           15:29     Electronically signed by: Bg Granado  Date:                                            09/08/2023  Time:                                           17:14    TTE 8/17/23:    Left Ventricle: The left ventricle is normal in size. Normal wall thickness. Normal wall motion. There is normal systolic function with a visually estimated ejection fraction of 60 - 65%. There is normal diastolic function.    Right Ventricle: Normal right ventricular cavity size. Wall thickness is normal. Right ventricle wall motion  is normal. Systolic function is normal.    Pulmonary Artery: The estimated pulmonary artery systolic pressure is 40 mmHg.      RHC 3/16/23:     The estimated blood loss was none.    The filling pressures on the right and left were normal.    RA 5  PA 35/10 (18)  PCWP 10    CO 6.9 l/min  CI 3.9 l/min/m2.    dcSSc MRSS 14 improved  ILD as above HRCT chest in June was stable and PFTs stable except for decrease in DLco likely related to PH although RHC was normal in March. Then Covid 8/28/23 with worsened pulmonary symptoms, PFTs and CT chest  Now clinically respiratory approximately at baseline, despite being off mycophenolate for weeks  Chronic foot ulcers, venous stasis, venous insufficiency and scleroderm  PH improved on RHC March but ePASP on TTE in August.       Resume mycophenolate  suspension 1000mg twice daily  Has f/u with Dr. Leigh  11/29/23 with PFTs and will see her 12/11/23 to decide on adding rituximab. Would also favor adding nintedanib if she can tolerate.

## 2023-10-24 ENCOUNTER — TELEPHONE (OUTPATIENT)
Dept: TRANSPLANT | Facility: CLINIC | Age: 73
End: 2023-10-24
Payer: MEDICARE

## 2023-10-24 RX ORDER — FUROSEMIDE 20 MG/1
10 TABLET ORAL DAILY
Qty: 30 TABLET | Refills: 5 | Status: SHIPPED | OUTPATIENT
Start: 2023-10-24 | End: 2024-10-23

## 2023-10-24 NOTE — TELEPHONE ENCOUNTER
"Pulmonary Hypertension Nurse Navigator contacted Yamel Shah concerning start of new medication, spironolactone. She states, "it seems to be working okay, I seem to be peeing a lot."  Does endorse weight gain, was 150 now 154. NN asked about swelling and was advised that she has decreased swelling in legs. Ms. Shah states that she remembers now why she was taken off of the spironolactone. She states that she had a hearing test and that provider advised that the spironolactone caused the tinnitus. Hearing test was performed on 5/20/2020 by Mc Whyte MD. Patient was taken off of spironolactone from that time. "Now that I am starting back with the spironolactone the tinnitus starting becoming sharp, again."  When she was talking to provider Karlene, at last appointment, "I could not remember that it was the spironolactone that caused the tinnitus." She continues, "It is not dramatic, but when there is silence, it bothers me.  When there is background noise I don't notice it a lot." She states that she will keep the television on at night so that she can fall asleep.    NN advised patient that provider would be notified of the issue.  She acknowledged.    2:28 PM   NN contacted Mrs. Shah concerning recommendations from provider, ROSEMARIE Pelaez DNP.  She was advised to stop taking the spironolactone 25 mg, and to start taking furosemide 10 mg (the prescription states take 1/2 tablet of 20 mg) daily. Prescription was sent to [facility]. She can take 20 mg daily PRN for swelling. NN advised that she should start uploading her blood pressures again.  She states that she still has the BP cuff from [facility], and can start doing that again. She said that she could also send message weekly with summary of her blood pressure results.  "

## 2023-10-26 ENCOUNTER — OFFICE VISIT (OUTPATIENT)
Dept: INTERNAL MEDICINE | Facility: CLINIC | Age: 73
End: 2023-10-26
Payer: MEDICARE

## 2023-10-26 ENCOUNTER — TELEPHONE (OUTPATIENT)
Dept: PAIN MEDICINE | Facility: CLINIC | Age: 73
End: 2023-10-26
Payer: MEDICARE

## 2023-10-26 ENCOUNTER — HOSPITAL ENCOUNTER (OUTPATIENT)
Dept: RADIOLOGY | Facility: HOSPITAL | Age: 73
Discharge: HOME | End: 2023-10-26
Attending: INTERNAL MEDICINE
Payer: MEDICARE

## 2023-10-26 VITALS
HEART RATE: 75 BPM | WEIGHT: 152.56 LBS | DIASTOLIC BLOOD PRESSURE: 60 MMHG | TEMPERATURE: 98 F | SYSTOLIC BLOOD PRESSURE: 134 MMHG | BODY MASS INDEX: 25.42 KG/M2 | RESPIRATION RATE: 15 BRPM | OXYGEN SATURATION: 94 % | HEIGHT: 65 IN

## 2023-10-26 DIAGNOSIS — I10 ESSENTIAL HYPERTENSION: Primary | ICD-10-CM

## 2023-10-26 DIAGNOSIS — E55.9 VITAMIN D DEFICIENCY: ICD-10-CM

## 2023-10-26 DIAGNOSIS — L97.529 SKIN ULCERS OF BOTH FEET: ICD-10-CM

## 2023-10-26 DIAGNOSIS — M25.561 RIGHT KNEE PAIN, UNSPECIFIED CHRONICITY: ICD-10-CM

## 2023-10-26 DIAGNOSIS — Z79.899 IMMUNOSUPPRESSION DUE TO DRUG THERAPY: ICD-10-CM

## 2023-10-26 DIAGNOSIS — M34.9 SCLERODERMA WITH PULMONARY INVOLVEMENT: ICD-10-CM

## 2023-10-26 DIAGNOSIS — D84.821 IMMUNOSUPPRESSION DUE TO DRUG THERAPY: ICD-10-CM

## 2023-10-26 DIAGNOSIS — L97.519 SKIN ULCERS OF BOTH FEET: ICD-10-CM

## 2023-10-26 PROCEDURE — 99214 OFFICE O/P EST MOD 30 MIN: CPT | Mod: S$PBB,,, | Performed by: INTERNAL MEDICINE

## 2023-10-26 PROCEDURE — 73562 X-RAY EXAM OF KNEE 3: CPT | Mod: 26,RT,TXP, | Performed by: RADIOLOGY

## 2023-10-26 PROCEDURE — 99999 PR PBB SHADOW E&M-EST. PATIENT-LVL V: ICD-10-PCS | Mod: PBBFAC,,, | Performed by: INTERNAL MEDICINE

## 2023-10-26 PROCEDURE — 99999 PR PBB SHADOW E&M-EST. PATIENT-LVL V: CPT | Mod: PBBFAC,,, | Performed by: INTERNAL MEDICINE

## 2023-10-26 PROCEDURE — 73562 XR KNEE 3 VIEW RIGHT: ICD-10-PCS | Mod: 26,RT,TXP, | Performed by: RADIOLOGY

## 2023-10-26 PROCEDURE — 73562 X-RAY EXAM OF KNEE 3: CPT | Mod: TC,PO,RT,NTX

## 2023-10-26 PROCEDURE — 99214 PR OFFICE/OUTPT VISIT, EST, LEVL IV, 30-39 MIN: ICD-10-PCS | Mod: S$PBB,,, | Performed by: INTERNAL MEDICINE

## 2023-10-26 PROCEDURE — 99215 OFFICE O/P EST HI 40 MIN: CPT | Mod: PBBFAC,PO | Performed by: INTERNAL MEDICINE

## 2023-10-26 RX ORDER — NABUMETONE 750 MG/1
750 TABLET, FILM COATED ORAL
COMMUNITY
Start: 2023-10-23 | End: 2024-01-26

## 2023-10-26 RX ORDER — NIFEDIPINE 90 MG/1
90 TABLET, FILM COATED, EXTENDED RELEASE ORAL NIGHTLY
Qty: 90 TABLET | Refills: 3 | Status: SHIPPED | OUTPATIENT
Start: 2023-10-26

## 2023-10-27 ENCOUNTER — OFFICE VISIT (OUTPATIENT)
Dept: PAIN MEDICINE | Facility: CLINIC | Age: 73
End: 2023-10-27
Payer: MEDICARE

## 2023-10-27 VITALS
HEART RATE: 67 BPM | OXYGEN SATURATION: 98 % | WEIGHT: 151.44 LBS | BODY MASS INDEX: 25.2 KG/M2 | SYSTOLIC BLOOD PRESSURE: 123 MMHG | DIASTOLIC BLOOD PRESSURE: 56 MMHG | RESPIRATION RATE: 18 BRPM | TEMPERATURE: 98 F

## 2023-10-27 DIAGNOSIS — G89.4 CHRONIC PAIN DISORDER: Primary | ICD-10-CM

## 2023-10-27 DIAGNOSIS — M34.89 CUTANEOUS SCLERODERMA: ICD-10-CM

## 2023-10-27 DIAGNOSIS — G60.9 IDIOPATHIC NEUROPATHY: ICD-10-CM

## 2023-10-27 DIAGNOSIS — M79.18 MYOFASCIAL PAIN: ICD-10-CM

## 2023-10-27 PROCEDURE — 99215 OFFICE O/P EST HI 40 MIN: CPT | Mod: PBBFAC | Performed by: NURSE PRACTITIONER

## 2023-10-27 PROCEDURE — 99999 PR PBB SHADOW E&M-EST. PATIENT-LVL V: CPT | Mod: PBBFAC,,, | Performed by: NURSE PRACTITIONER

## 2023-10-27 PROCEDURE — 99214 PR OFFICE/OUTPT VISIT, EST, LEVL IV, 30-39 MIN: ICD-10-PCS | Mod: S$PBB,,, | Performed by: NURSE PRACTITIONER

## 2023-10-27 PROCEDURE — 99214 OFFICE O/P EST MOD 30 MIN: CPT | Mod: S$PBB,,, | Performed by: NURSE PRACTITIONER

## 2023-10-27 PROCEDURE — 99999 PR PBB SHADOW E&M-EST. PATIENT-LVL V: ICD-10-PCS | Mod: PBBFAC,,, | Performed by: NURSE PRACTITIONER

## 2023-10-27 RX ORDER — ACETAMINOPHEN AND CODEINE PHOSPHATE 300; 30 MG/1; MG/1
1 TABLET ORAL DAILY PRN
Qty: 30 TABLET | Refills: 0 | Status: SHIPPED | OUTPATIENT
Start: 2023-10-27 | End: 2023-11-26

## 2023-10-27 RX ORDER — TIZANIDINE 4 MG/1
4 TABLET ORAL NIGHTLY PRN
Qty: 30 TABLET | Refills: 3 | Status: SHIPPED | OUTPATIENT
Start: 2023-10-27 | End: 2024-01-26 | Stop reason: SDUPTHER

## 2023-10-27 NOTE — PROGRESS NOTES
Chronic Pain - Follow Up          Referring Physician: No ref. provider found    Chief Complaint:   Chief Complaint   Patient presents with    Chronic Pain        SUBJECTIVE: Disclaimer: This note has been generated using voice-recognition software. There may be typographical errors that have been missed during proof-reading    Interval History 10/27/2023:  The patient returns to clinic today for follow up of foot pain. She continues to report bilateral foot pain. She describes this pain as burning and stinging in nature. She now has sores under her ankles. She is having trouble laying on her sides at night due to pain. The sores by her toes are healing. She continues to report benefit with home medication regimen. She is taking Lyrica, Zanaflex, and Relafen. She also takes Tylenol #3 with benefit. She is out of this medication. She denies any adverse effects. She denies any other health changes. Her pain today is 5/10.     Interval History 7/28/2023:  The patient returns to clinic today for follow up of neck and foot pain via virtual visit. She continues to report bilateral foot pain. This is worse at night, described as burning in nature. She does report some new ulcers to her feet. She does have a home wound care regimen. She continues to report intermittent neck pain. She is taking Lyrica, Zanaflex, and Relafen with benefit. She also takes Tylenol #3 as needed for severe pain with benefit and without adverse effects. She denies any other health changes.     Interval History 4/28/2023:  The patient returns to clinic today for follow up of neck and foot pain. Since last visit, she has had pneumonia. She also had hiatal hernia repair. She continues to report bilateral foot pain. This is burning in nature. Her pain is worse at night. She reports intermittent neck pain. She continues to take Lyrica, Zanaflex, and Relafen with benefit. She also takes Tylenol #3 for severe pain. She denies any adverse effects. She  denies any other health changes. Her pain today is 4/10.    Interval History 12/30/2022:  The patient returns to clinic today for follow up of neck and foot pain. She reports increased foot pain over the last 2 months. She continues to report ulcers to her feet. She reports bilateral foot pain. Her pain is worse at night. She reports intermittent neck pain. She is taking Lyrica, Zanaflex, and Relafen with benefit. She also takes Tylenol #3 with benefit. She denies any adverse effects. She denies any other health changes. Her pain today is 6/10.    Interval History 8/26/2022:  The patient returns to clinic today for follow up neck and foot pain. She reports increased pain over the last few days. She attributes this as she is out of Lyrica. She continues to report bilateral foot pain. She continues to have wounds. She continues to perform wound care. She continues to report intermittent neck pain. She continues to take Lyrica, Zanaflex, and Relafen with benefit. She continues to take Tylenol #3 for severe pain as needed with benefit. She denies any adverse effects. She denies any other health changes. Her pain today is 4/10.    Interval History 5/26/2022:  The patient returns to clinic today for follow up of neck and foot pain via virtual visit. She continues to report bilateral foot pain. She continues to report ulcers to her feet. She continues to report neck pain with intermittent radiating pain into her arms. She continues to report benefit with current medications. She is taking Lyrica, Zanaflex, and Relafen. She also takes Tylenol #3 with benefit. She denies any adverse effects with these medications. She continues to perform a home exercise routine. She denies any other health changes.     Interval History 2/15/22  Patient presents in follow up. Was previously Dr. King patient with primary complaints of neck and foot pain. She reports her pain has been stable since her last visit. She did have covid in  January and stopped all of ehr pain medications. She got an antibody infusion. She has fully recovered. She restarted her pain medications and reports that they are effective. She is taking Tylenol 3 daily, nambumetome 750 mg BID, lyrica 150 mg BID and Zanaflex 4 mg TID. She did do PT for her neck November to January per her report. She continues with mild neck pain without radiation into the arms or hands. Pain is worse with sidebending and rotation to the right and better with rest and medications. She denies any numbness tingling weakness or b/b incontinence.    Interval History 1/4/2022:  The patient returns to clinic today for follow up of foot pain via virtual visit. She continues to report foot pain and wounds. She continues to follow up with podiatry and wound care. She reports increased neck pain. This pain is tight and aching in nature. She denies any radicular arm pain. She is currently taking Relafen, Lyrica, and Tylenol #3 with benefit and without adverse effects. She reports limited benefit with Zanaflex. She denies any other health changes.     Interval History 12/8/2021:  The patient returns to clinic today for foot pain via virtual visit. She continues to report foot pain. She does have foot wounds. She recently saw Dr. Claudio in [facility]. He does not recommend any vascular interventions at this time. She continues to follow up with Podiatry and wound care. She continues to take Zanaflex, Relafen, Lyrica and Tylenol #3 with benefit. She denies any adverse effects. She denies any other health changes. Her pain today is 7/10.    Interval History 11/8/2021:  The patient returns to clinic today for follow up of foot pain via virtual visit. At last visit, she was starting on Tylenol #3. She does find benefit with this. She sometimes breaks this in half. She continues to take Zanaflex, Relafen, and Lyrica. She continues to report bilateral foot pain and ulcers. She continues to follow up with podiatry. Her  pain is worse at night. She denies any other health changes. Her pain today is 7/10.    Interval History 10/20/2021:  The patient returns to clinic today for follow up of foot pain. At last visit, she was started on Tramadol. She did have relief but had significant side effects. She experienced sedation, itching, headaches, and decreased appetite. This has resolved after stopping the medication. She continues to report foot pain. This pain is worse at night. She continues to follow up with podiatry. She continues to take Tizanidine, Relafen, and Lyrica with some benefit. She denies any adverse effects. She denies any other health changes. Her pain today is 8/10.    Interval history 10/06/2021:  Since previous encounter the patient continues to have nonhealing wound has been followed up with wound care and is scheduled to follow with Rheumatology for the wounds in her legs she was referred to podiatry with stated that they have done nothing different me that she with doing on her own.  She continues to have neck pain and bilateral foot pain.  She has been taking tizanidine Relafen Tylenol p.m. and Lyrica 600 mg per day.  She states that all these medications are helpful.    Interval History 6/3/2021:  The patient returns to clinic today for follow up of foot pain. She continues to report left foot pain secondary to ulcer. She is seeing Wound Care. She describes this pain as burning in nature. Her pain is worse with prolonged activity. She reports intermittent neck pain. She continues to take Zanaflex. She reports limited benefit with increased Lyrica dose. She denies any other health changes. Her pain today is 5/10.    Interval History 5/5/2021:  The patient returns to clinic today for follow up of foot pain. She reports a new ulcer on her left foot. She is seeing Wound Care. She reports increased pain with this new ulcer. She describes this pain as burning. She continues to report neck pain that is tight and aching.  She denies any radicular arm pain. Her pain is worse with prolonged activity, especially turning her head to the side. She continues to perform a home exercise routine. She continues to take Lyrica and Zanaflex with benefit. She denies any other health changes. Her pain today is 5/10.    Interval History 2/8/2021:  The patient returns to clinic today for follow up of neck and foot pain. She reports that her neck pain has significantly improved since last visit. She has recently completed physical therapy for this. She is performing a home stretching routine. She reports intermittent neck pain that is tight and aching in nature. She denies any radicular arm pain. She continues to report bilateral foot pain. This is worse at night. She continues to take Lyrica and Zanaflex with benefit. She denies any other health changes. Her pain today is 4/10.    Interval History 1/8/2021:  The patient returns to clinic today for follow up of neck and foot pain. She continues to report neck pain that is tight and aching in nature. She denies any radicular pain. She continues to participate in physical therapy and a home exercise routine. She continues to report bilateral foot pain, worse at night. She reports that increased Lyrica dose has provided improved benefit. She also takes Zanaflex with benefit. She denies any other health changes. Her pain today is 3/10.    Interval History 11/13/2020:  The patient returns to clinic today for follow up of neck and foot pain. She continues to report bilateral foot pain. Since last visit, she had a venous ablation procedure on her right leg through Vascular. She is scheduled for the left side in the next month. She continues to report bilateral foot pain, worse at night. She continues to report neck pain that is tight and aching in nature. She denies any radicular pain. Her pain is worse flexion and turning her head to the side. She is currently participating in physical therapy with some  benefit. She continues to take Lyrica but does ask if this could be increased. She continues to take Zanaflex with benefit. She denies any other health changes. Her pain today is 5/10    Interval History 10/2/2020:  The patient returns to clinic today for follow up of foot pain. She reports increased bilateral foot pain over the last few months. This pain is burning in nature. This pain is worse at night. She continues to have ulcers to her feet related to her scleroderma. She would like to restart the Lyrica. She also reports increased neck pain that is sore and aching in nature. She denies any radiating arm pain. This is worse with turning her head and at night. She denies any other health changes. Her pain today is 7/10.    Interval History 6/5/2020:  The patient requests audio visit today for follow up of bilateral foot pain. She reports intermittent foot pain that is tolerable at this time. She has discontinued Lyrica as of April. She continues to have ulcers to her feet. She does have wound care. She denies any other health changes.     Interval History 1/17/2020:  The patient returns to clinic today for follow up. She continues to report bilateral foot pain. She describes this pain as burning and tingling in nature. At last visit, we decreased her Lyrica dose to 100 mg at night. She reports increased pain since then. She would like to go to back to the 150 mg dose. She continues to have ulcers to her feet, left worse than right. She continues to participate in a home wound care program. She denies any other health changes. Her pain today is 6/10.    Interval History 12/17/2019:  The patient returns to clinic today for follow up. She reports improving foot pain. She reports improved healing ulcers to her left foot. She continues to report right foot pain that is burning and tingling in nature. She continues to participate in a home wound care routine. She continues to take Lyrica. She asks about decreasing  this. She denies any other health changes. Her pain today is 5/10.    Interval History 9/17/2019:  The patient returns to clinic today for follow up. She continues to report bilateral foot pain that is burning and tingling in nature. Her pain is worse with sitting and at night. She continues to have slow healing ulcers to both feet. She continues to perform a home wound care program. She is no longer taking Gabapentin which was previously called in. She is now taking Pregabalin generic with benefit. She denies any other health changes. Her pain today is 4/10.    Interval History 6/13/2019:  The patient returns to clinic for follow up for bilateral foot pain. She continues to report bilateral foot pain that is burning in nature. She continues to have slow healing wounds to both feet from ulcers. She continues to take Lyrica once daily but reports increased pain. She continues to take Vitamin B12. She denies any other health changes. Her pain today is 8/10.    Interval History 3/13/2019:  The patient returns to clinic today for follow up. She continues to report bilateral foot pain that is burning and tingling in nature. Her pain is worse with prolonged walking and standing. She continues to have bilateral foot wounds. She reports that these wounds have significantly improved since last visit. She is currently taking Vitamin B12 with benefit. She continues to report benefit with Lyrica. She is currently taking this once daily. She denies any other health changes. Her pain today is 5/10.    Interval History 2/6/2019:  The patient returns to clinic today for follow up. She reports that her bilateral foot wounds have significantly improved since last visit. She does report some significant improvement in her foot pain. She reports intermittent bilateral foot pain especially with prolonged walking. She describes this pain as heavy and tingling in nature. She is no longer taking Celebrex. She is currently taking Lyrica 150  mg BID with benefit. She does report that the Lyrica is expensive for her. She denies any other health changes. Her pain today is 5/10.    Interval History 12/5/18:  Patient returns to clinic today for follow up. She reports that since increasing her medications, she is having significant improvement in pain during the day time. She is currently taking Lyrica 75mg TID and Celebrex 200mg TID. However, she states that the burning  And tingling pain in both her feet increase in the evening around 6 or 7pm. She is unable to sleep during the nights due to the pain. She is still wearing her bilateral boots due to non healing ulcers from her scleroderma.     Interval History 8/30/2018:  The patient returns to clinic today for follow up. She continues to report bilateral foot pain that is burning and tingling in nature. She reports that this pain is worse at night. She is currently taking Lyrica 75 mg BID with limited relief. She did not have any benefit with Mobic. She continues to take Aleve with some benefit. She continues to have nonhealing ulcers. She wears an ACE bandage to her right calf, as well as boots to her bilateral feet. She denies any other health changes. Her pain today is 7/10.     Interval History 7/11/2018:  Since previous encounter she has weaned from gabapentin to 600mg / day and did not notice significant worsening of pain, but was having somnelence from higher doses of the medication in the past.  We are weaning in an attempt to trial Lyrica as an alternative to gabapentin.  Tramadol causes significant sedation, limiting its use.  She has discontinued the use of the tramadol.  Additionally she does have benefit from anti-inflammatory medication, has been using aleve, has not trialed CANTRELL-2 inhibitors in the past.    Interval history 05/16/2018:      The patient has had x-rays of the lumbar spine which did not reveal any evidence of significant neuroforaminal stenosis with degenerative disc disease.   There is mild facet arthropathy.  She has escalated her gabapentin and is currently taking 2100 mg of gabapentin per day without any noticeable improvement but daytime somnolence.  She continues to have nonhealing ulcers and topical pain cream is helping to a limited degree over her leg in areas where there is no skin disruption.    Initial encounter:    Yamel Shah presents to the clinic for the evaluation of foot pain. The pain started 2 years ago following ulcers and symptoms have been worsening.    Brief history: History of scleroderma    Pain Description:    The pain is located in the both feet in the area of slowly healing chronic foot ulcers which were partly from venous insufficiency and stasis associated with her scleroderma.  In her right lower extremity she describes radicular symptoms in the L4/5 distribution.    At BEST  5/10     At WORST  10/10 on the WORST day.      On average pain is rated as 8/10.     Today the pain is rated as 8/10    The pain is described as burning, sharp, shooting and constant       Symptoms interfere with daily activity, sleeping and work.     Exacerbating factors: Laying, Walking, Night Time, Morning and Getting out of bed/chair.      Mitigating factors medications.     Patient denies night fever/night sweats, urinary incontinence, bowel incontinence, significant weight loss, significant motor weakness and loss of sensations.  Patient denies any suicidal or homicidal ideations    Pain Medications:  Current:  Lyrica 300 mg daily  Zanaflex  Relafen  Tylenol #3    Tried in Past:  NSAIDs -Aleve, Mobic, Celebrex  TCA -Never  SNRI -Never  Anti-convulsants - Gabapentin, Lyrica  Muscle Relaxants -Never  Opioids-Tramadol    Physical Therapy/Home Exercise: no       report:  Reviewed and consistent with medication use as prescribed.    Pain Procedures: none    Chiropractor -never  Acupuncture - never  TENS unit -never  Spinal decompression -never  Joint replacement  -never    Imaging:   Xray Cervical Spine 12/6/2019:  FINDINGS:  Odontoid prevertebral soft tissues and posterior elements are intact.  Neural foramina are patent.  No fracture dislocation bone destruction seen.  There is mild DJD.     Impression:     Mild DJD.    X-ray lumbar spine 6/1/2016:  2 views: Alignment is normal. There is mild DJD. No fracture dislocation bone destruction seen.   Impression      Mild DJD.     Xray lumbar spine 4/2018:  COMPARISON:  June 2016    FINDINGS:  There is slight curvature of the lumbar spine.  The vertebral bodies are normally aligned and normal in height.  Mild disc space narrowing present at L5-S1.  There is mild facet degenerative change in the lower spine.  No significant osteophytic spurring present.  There is no change in alignment with flexion or extension.      Past Medical History:   Diagnosis Date    Abnormal Pap smear     Acid reflux     Allergy     Arthritis     Encounter for blood transfusion     GERD (gastroesophageal reflux disease)     Hiatal hernia 03/24/2023    History of migraine headaches     Hx of colonic polyp     Hypertension     Idiopathic neuropathy 07/20/2012    ILD (interstitial lung disease) 11/06/2013    Iron deficiency anemia 03/18/2014    MRSA carrier     Osteopenia     Pneumonia     Pulmonary fibrosis     Pulmonary hypertension     Raynaud's disease     Scleroderma, diffuse     Sjogren's syndrome     Vitamin D deficiency 11/14/2013     Past Surgical History:   Procedure Laterality Date    24 HOUR IMPEDANCE PH MONITORING OF ESOPHAGUS IN PATIENT NOT TAKING ACID REDUCING MEDICATIONS N/A 03/04/2020    Procedure: IMPEDANCE PH STUDY, ESOPHAGEAL, 24 HOUR, IN PATIENT NOT TAKING ACID REDUCING MEDICATION;  Surgeon: Annamaria Mendoza MD;  Location: [facility] ([address] [place] [address]);  Service: Endoscopy;  Laterality: N/A;  OFF PPI/H2 Blocker   Motility Studies   Hold Narcotics x 1 days   Hold TCA x 1 days  2/26 - LVM attempting to confirm appt  2/27 - Confirmed appt     ANORECTAL MANOMETRY N/A 05/10/2021    Procedure: MANOMETRY, ANORECTAL with balloon expulsion test;  Surgeon: Annamaria Mendoza MD;  Location: [facility] ENDO (4TH FLR);  Service: Endoscopy;  Laterality: N/A;  order combined  covid test 5/7 [facility], instructions emailed-[facility]    BREAST BIOPSY      Left, benign    BRONCHOSCOPY N/A 10/2/2023    Procedure: bronch;  Surgeon: Roberta Leigh DO;  Location: [facility] OR 2ND FLR;  Service: Endoscopy;  Laterality: N/A;    CERVICAL CONIZATION   W/ LASER  1970    COLONOSCOPY      COLONOSCOPY N/A 03/29/2019    Procedure: COLONOSCOPY;  Surgeon: Annamaria Mendoza MD;  Location: [facility] ENDO (2ND FLR);  Service: Endoscopy;  Laterality: N/A;    COLONOSCOPY N/A 05/10/2021    Procedure: COLONOSCOPY;  Surgeon: Annamaria Mendoza MD;  Location: [facility] ENDO (4TH FLR);  Service: Endoscopy;  Laterality: N/A;  2nd floor-previous scopes done on 2nd floor, gastroparesis  full liquid diet x2 days, clear liquid x1 day prior to procedure  covid test 5/7 [facility], instructions emailed-vt  5/6 pt confirmed appt-[facility]    DILATION AND CURETTAGE OF UTERUS      ESOPHAGEAL MANOMETRY WITH MEASUREMENT OF IMPEDANCE N/A 03/04/2020    Procedure: MANOMETRY, ESOPHAGUS, WITH IMPEDANCE MEASUREMENT;  Surgeon: Annamaria Mendoza MD;  Location: [facility] ENDO (4TH FLR);  Service: Endoscopy;  Laterality: N/A;  OFF PPI/H2 Blocker   Motility Studies   Hold Narcotics x 1 days   Hold TCA x 1 days    ESOPHAGOGASTRODUODENOSCOPY      ESOPHAGOGASTRODUODENOSCOPY N/A 03/29/2019    Procedure: EGD (ESOPHAGOGASTRODUODENOSCOPY);  Surgeon: Annamaria Mendoza MD;  Location: [facility] ENDO (2ND FLR);  Service: Endoscopy;  Laterality: N/A;  pulmonary htn    ESOPHAGOGASTRODUODENOSCOPY N/A 02/02/2021    Procedure: ESOPHAGOGASTRODUODENOSCOPY (EGD);  Surgeon: Annamaria Mendoza MD;  Location: [facility] ENDO (2ND FLR);  Service: Endoscopy;  Laterality: N/A;  2nd floor gastroparesis  3 days full liquid diet and 1 day clears  covid test 1/30 primary care, instructions sent  to [facility]    ESOPHAGOGASTRODUODENOSCOPY N/A 07/01/2022    Procedure: ESOPHAGOGASTRODUODENOSCOPY (EGD);  Surgeon: Annamaria Mendoza MD;  Location: [facility] (2ND FLR);  Service: Endoscopy;  Laterality: N/A;  2nd floor-gastroparesis  full liquid diet x3 days, clear liquid diet x1 day prior to procedure  fully vaccinated, instructions sent to [email or web address]  6/29-pt confirmed new arrival time-[facility]    EXCISION, LESION, STOMACH, LAPAROSCOPIC N/A 03/23/2023    Procedure: XI ROBOTIC EXCISION, LESION, STOMACH;  Surgeon: Katherine Segura MD;  Location: [facility] OR [address] [place] [address];  Service: General;  Laterality: N/A;    EXCISIONAL BIOPSY, LYMPH NODE Right 05/26/2023    Procedure: EXCISIONAL BIOPSY, LYMPH NODE, INGUINAL;  Surgeon: Katherine Segura MD;  Location: [address];  Service: General;  Laterality: Right;    HYSTERECTOMY  1990    FRANDY (AUB, Fibroids), ovaries remain    REPAIR, HERNIA, UMBILICAL N/A 03/23/2023    Procedure: REPAIR, HERNIA, UMBILICAL;  Surgeon: Katherine Segura MD;  Location: [address];  Service: General;  Laterality: N/A;    RIGHT HEART CATHETERIZATION Right 01/05/2021    Procedure: INSERTION, CATHETER, RIGHT HEART;  Surgeon: Aureliano Sy MD;  Location: [facility] CATH LAB;  Service: Cardiology;  Laterality: Right;    RIGHT HEART CATHETERIZATION Right 03/16/2023    Procedure: INSERTION, CATHETER, RIGHT HEART;  Surgeon: Aureliano Sy MD;  Location: [facility] CATH LAB;  Service: Cardiology;  Laterality: Right;    ROBOT-ASSISTED LAPAROSCOPIC REPAIR OF INGUINAL HERNIA USING DA VERNELL XI Right 05/26/2023    Procedure: XI ROBOTIC REPAIR, HERNIA, INGUINAL, FEMORAL;  Surgeon: Katherine Segura MD;  Location: [address];  Service: General;  Laterality: Right;    ROBOT-ASSISTED REPAIR OF HIATAL HERNIA USING DA VERNELL XI N/A 03/23/2023    Procedure: XI ROBOTIC REPAIR, HERNIA, HIATAL;  Surgeon: Katherine Segura MD;  Location: [address];  Service:  General;  Laterality: N/A;    VARICOSE VEIN SURGERY      XI ROBOTIC GASTROPEXY N/A 03/23/2023    Procedure: XI ROBOTIC GASTROPEXY;  Surgeon: Katherine Segura MD;  [facility];  Service: General;  Laterality: N/A;    XI ROBOTIC PYLOROMYOTOMY N/A 03/23/2023    Procedure: XI ROBOTIC PYLOROMYOTOMY;  Surgeon: Katherine Segura MD;  [facility];  Service: General;  Laterality: N/A;     Social History     Socioeconomic History    Marital status:      Spouse name: Lewis    Number of children: 4   Occupational History    Occupation: -retired     Employer: [employer]     Comment: [employer]     Employer: RETIRED   Tobacco Use    Smoking status: Never     Passive exposure: Never    Smokeless tobacco: Never   Substance and Sexual Activity    Alcohol use: Never     Comment: wine occasionally    Drug use: No    Sexual activity: Yes     Partners: Male     Birth control/protection: Post-menopausal, None   Other Topics Concern    Are you pregnant or think you may be? No    Breast-feeding No   Social History Narrative         Social Determinants of Health     Financial Resource Strain: Low Risk  (10/22/2023)    Overall Financial Resource Strain (CARDIA)     Difficulty of Paying Living Expenses: Not hard at all   Food Insecurity: No Food Insecurity (10/22/2023)    Hunger Vital Sign     Worried About Running Out of Food in the Last Year: Never true     Ran Out of Food in the Last Year: Never true   Transportation Needs: No Transportation Needs (10/22/2023)    PRAPARE - Transportation     Lack of Transportation (Medical): No     Lack of Transportation (Non-Medical): No   Recent Concern: Transportation Needs - Unmet Transportation Needs (9/18/2023)    PRAPARE - Transportation     Lack of Transportation (Medical): Yes     Lack of Transportation (Non-Medical): No   Physical Activity: Insufficiently Active (10/22/2023)    Exercise Vital Sign     Days of Exercise  per Week: 1 day     Minutes of Exercise per Session: 10 min   Stress: No Stress Concern Present (10/22/2023)    [place] [employer] - Occupational Stress Questionnaire     Feeling of Stress : Only a little   Social Connections: Unknown (10/22/2023)    Social Connection and Isolation Panel [NHANES]     Frequency of Communication with Friends and Family: Twice a week     Frequency of Social Gatherings with Friends and Family: Patient refused     Active Member of Clubs or Organizations: No     Attends Club or Organization Meetings: Never     Marital Status:    Recent Concern: Social Connections - Socially Isolated (8/12/2023)    Social Connection and Isolation Panel [NHANES]     Frequency of Communication with Friends and Family: Twice a week     Frequency of Social Gatherings with Friends and Family: Never     Attends Confucianism Services: Never     Active Member of Clubs or Organizations: No     Attends Club or Organization Meetings: Never     Marital Status:    Housing Stability: Low Risk  (10/22/2023)    Housing Stability Vital Sign     Unable to Pay for Housing in the Last Year: No     Number of Places Lived in the Last Year: 1     Unstable Housing in the Last Year: No     Family History   Problem Relation Age of Onset    Breast cancer Mother     Cancer Mother         Breast    Hypertension Father     Diabetes Father         Amputation of legs    Breast cancer Sister     Diabetes Sister     Arthritis Sister     Cancer Sister         Breast    Osteoarthritis Brother     Diabetes Brother     Arthritis Brother     Birth defects Brother         Polio at birth, recently had knee replacement surgery on left knee    No Known Problems Daughter     No Known Problems Daughter     No Known Problems Son     No Known Problems Son     Breast cancer Maternal Aunt     Cancer Maternal Aunt         Breast    Early death Sister     Melanoma Neg Hx     Colon cancer Neg Hx     Crohn's disease Neg Hx      Stomach cancer Neg Hx     Ulcerative colitis Neg Hx     Rectal cancer Neg Hx     Irritable bowel syndrome Neg Hx     Esophageal cancer Neg Hx     Celiac disease Neg Hx     Ovarian cancer Neg Hx     Liver cancer Neg Hx     Pancreatic cancer Neg Hx        Review of patient's allergies indicates:   Allergen Reactions    Sulfa (sulfonamide antibiotics)      Other reaction(s): Rash       Current Outpatient Medications   Medication Sig    albuterol (PROVENTIL) 2.5 mg /3 mL (0.083 %) nebulizer solution Take 3 mLs (2.5 mg total) by nebulization every 4 (four) hours as needed for Wheezing or Shortness of Breath (or cough). Rescue    albuterol (VENTOLIN HFA) 90 mcg/actuation inhaler Inhale 2 puffs into the lungs every 4 (four) hours as needed for Wheezing. Rescue    carboxymethylcellulose sodium (REFRESH TEARS) 0.5 % Drop Apply 1-2 drops to every as needed    ergocalciferol (ERGOCALCIFEROL) 50,000 unit Cap TAKE 1 CAPSULE BY MOUTH EVERY 7 DAYS    furosemide (LASIX) 20 MG tablet Take 0.5 tablets (10 mg total) by mouth once daily.    multivitamin capsule Take 1 capsule by mouth once daily.    mycophenolate mofetil (CELLCEPT) 200 mg/mL SusR Take 5 mLs (1,000 mg total) by mouth 2 (two) times daily.    nabumetone (RELAFEN) 750 MG tablet Take 750 mg by mouth.    nebulizer and compressor Rosemary Use as directed    NIFEdipine (ADALAT CC) 90 MG TbSR Take 1 tablet (90 mg total) by mouth nightly. TAKE 1 TABLET(90 MG) BY MOUTH EVERY DAY (TAKES NIGHTLY WITH 30 MG TABLET TOTAL 120 MG)    NIFEdipine (PROCARDIA-XL) 30 MG (OSM) 24 hr tablet TAKE 1 TABLET(30 MG) BY MOUTH EVERY DAY    pravastatin (PRAVACHOL) 20 MG tablet Take 1 tablet (20 mg total) by mouth every evening.    pregabalin (LYRICA) 300 MG Cap Take 1 capsule (300 mg total) by mouth 2 (two) times daily.    PREVIDENT 5000 BOOSTER PLUS 1.1 % Pste SMARTSIG:To Teeth    PREVIDENT 5000 SENSITIVE 1.1-5 % Pste BRUSH FOR 2 MINUTES TWICE PER DAY    RABEprazole (ACIPHEX) 20 mg tablet Take 1  tablet (20 mg total) by mouth 2 (two) times daily.    tadalafil (ADCIRCA) 20 mg Tab Take 2 tablets (40 mg total) by mouth once daily. (Patient taking differently: Take 40 mg by mouth every evening.)    tiZANidine (ZANAFLEX) 4 MG tablet Take 1 tablet (4 mg total) by mouth nightly as needed (muscle pain).     No current facility-administered medications for this visit.     Facility-Administered Medications Ordered in Other Visits   Medication    fentaNYL 50 mcg/mL injection 25 mcg    haloperidol lactate injection 0.5 mg    sodium chloride 0.9% flush 10 mL       REVIEW OF SYSTEMS:    GENERAL:  No weight loss, malaise or fevers.  HEENT:   No recent changes in vision or hearing  NECK:  Negative for lumps,  difficulty with swallowing associated with scleroderma.  RESPIRATORY:  Negative for cough, wheezing or shortness of breath, patient denies any recent URI. Albuterol (history of ILD)  CARDIOVASCULAR:  Negative for chest pain, leg swelling or palpitations.  GI:  Negative for abdominal discomfort, blood in stools or black stools or change in bowel habits. GERD controlled zantac  MUSCULOSKELETAL:  See HPI.  SKIN:   Chronic low healing venous stasis ulcerations to bilateral lower extremities   PSYCH:  No mood disorder or recent psychosocial stressors.  Patients sleep is not disturbed secondary to pain.  HEMATOLOGY/LYMPHOLOGY:   History of iron deficiency anemia, takes daily iron supplementation.    ENDO: No history of diabetes or thyroid dysfunction  NEURO:   No history of headaches, syncope, paralysis, seizures or tremors.  All other reviewed and negative other than HPI.    OBJECTIVE:      BP (!) 123/56   Pulse 67   Temp 98.3 °F (36.8 °C)   Resp 18   Wt 68.7 kg (151 lb 7.3 oz)   SpO2 98%   BMI 25.20 kg/m²     PHYSICAL EXAMINATION:    GENERAL: Well appearing, in no acute distress, alert and oriented x3.  PSYCH:  Mood and affect appropriate.  SKIN: Tight skin associated with scleroderma. Wearing shoes today.    HEAD/FACE:  Normocephalic, atraumatic. Cranial nerves grossly intact.  CV: RRR with palpation of the radial artery.  PULM: No evidence of respiratory difficulty, symmetric chest rise.  EXTREMITIES: Contractures over on bilateral hands with ulcerations to knuckles, right greater than left.   MUSCULOSKELETAL:   Bilateral lower extremity strength testing limited secondary to pain in the feet.    NEURO:  Decreased sensation to BLE.   GAIT: Antalgic, ambulates without assistance       ASSESSMENT: 72 y.o. year old female with pain, consistent with the following:    Encounter Diagnoses   Name Primary?    Chronic pain disorder Yes    Cutaneous scleroderma     Idiopathic neuropathy     Myofascial pain            PLAN:     - Previous imaging reviewed today. Labs reviewed.      - Continue Lyrica 300 mg BID.     - Continue Relafen.     - Continue Zanaflex, refill provided.     - Pain Contract signed 8/26/2022.     - Continue Tylenol #3 once daily PRN. Refill provided.    - The patient is here today for a refill of current pain medications and they believe these provide effective pain control and improvements in quality of life.  The patient notes no serious side effects, and feels the benefits outweigh the risks.  The patient was reminded of the pain contract that they signed previously as well as the risks and benefits of the medication including possible death.  The updated [place] [employer] prescription monitoring program was reviewed, and the patient has been found to be compliant with current treatment plan. Medication management provided by Dr. Hinson.     - Previous UDS reviewed.  Repeat next visit, as she is out of medication.     - Continue home exercise routine.     - RTC in 3 months or sooner if needed.      The above plan and management options were discussed at length with patient. Patient is in agreement with the above and verbalized understanding.     Viktoriya Camara NP  10/27/2023

## 2023-10-31 LAB
FUNGUS SPEC CULT: ABNORMAL
FUNGUS SPEC CULT: NORMAL

## 2023-11-01 DIAGNOSIS — I27.20 PULMONARY HYPERTENSION: Primary | ICD-10-CM

## 2023-11-07 ENCOUNTER — TELEPHONE (OUTPATIENT)
Dept: INTERNAL MEDICINE | Facility: CLINIC | Age: 73
End: 2023-11-07
Payer: MEDICARE

## 2023-11-07 NOTE — TELEPHONE ENCOUNTER
----- Message from Anay Garcia MA sent at 11/7/2023  1:30 PM CST -----  Contact: [phone]    ----- Message -----  From: Purnima Jimenez  Sent: 11/7/2023   1:06 PM CST  To: Giorgi Lu A Staff    Caller is requesting an earlier appointment then we can schedule.  Caller is requesting a message be sent to the provider.  If this is for urgent care symptoms, did you offer other providers at this location, providers at other locations, or [facility]? (yes, no, n/a):  n/a  If this is for the patients physical, did you offer to schedule next available and put on wait list, or to see NP or PA for their physical?  (yes, no, n/a):  n/a  When is the next available appointment with their provider:  04/04/2024  Reason for the appointment:  pre op   Patient preference of timeframe to be scheduled:  before the end of December . Operation 01/09/2024  Would the patient like a call back, or a response through their [facility] portal?:   call back   Comments:

## 2023-11-07 NOTE — TELEPHONE ENCOUNTER
Surg is 1/9 so will need an appt after 12/9.  I will call soon ,waiting to see if get a cancellation in December.

## 2023-11-08 NOTE — TELEPHONE ENCOUNTER
Left detailed msg dr sosa is booked lavon.  She needs to be seen within 30 days of surgery date 1/9/24 for clearance.    I said dr sosa recommends she see either another dr in clinic or our  Np xi gaines for clearance.  Phone staff can help book.

## 2023-11-13 NOTE — PROGRESS NOTES
Subjective:       Patient ID: Yamel Shah is a 72 y.o. female.    Chief Complaint: Follow-up (6 mo)    HPI  The patient presents for follow-up of medical conditions which include hypertension, scleroderma, skin ulcers.  The patient also has peripheral neuropathy in his followed by pain management.  She is currently on Humira tone, tizanidine, and Lyrica for pain management.  Fair control is reported.  The patient also has interstitial lung disease related to her scleroderma.  She is also followed by her rheumatologist Dr. Ahuja.    She is status post COVID-19 infection in August of 2023.  Symptoms of nausea and diarrhea were noted.  She was treated in [place] at [facility] for 3 days.    The patient states she is eating smaller meal portions.  She eats very little meat since she has trouble digesting it.  She has had increased episodes of nausea and vomiting after eating certain foods.  She plans to discuss this with her gastroenterologist Dr. Dao.    Review of Systems   Constitutional:  Negative for activity change, appetite change, fatigue and unexpected weight change.   HENT:  Positive for trouble swallowing.    Eyes:  Negative for visual disturbance.   Respiratory:  Negative for cough and shortness of breath.    Cardiovascular:  Negative for chest pain, palpitations and leg swelling.   Gastrointestinal:  Positive for abdominal distention. Negative for abdominal pain, blood in stool, constipation and diarrhea.   Genitourinary:  Negative for dysuria and hematuria.   Musculoskeletal:  Positive for arthralgias. Negative for neck pain and neck stiffness.   Integumentary:  Positive for wound (chronic skin ulcers of the feet are present.). Negative for rash.   Neurological:  Positive for numbness. Negative for dizziness, syncope and headaches.   Psychiatric/Behavioral:  Negative for sleep disturbance.             Physical Exam  Vitals and nursing note reviewed.   Constitutional:       General: She is not  in acute distress.     Appearance: Normal appearance. She is well-developed.      Comments: The patient has lost 3 lb since 04/25/2023.   HENT:      Head: Normocephalic and atraumatic.   Eyes:      General: No scleral icterus.     Extraocular Movements: Extraocular movements intact.      Conjunctiva/sclera: Conjunctivae normal.   Neck:      Thyroid: No thyromegaly.      Vascular: No JVD.   Cardiovascular:      Rate and Rhythm: Normal rate and regular rhythm.      Heart sounds: Normal heart sounds. No murmur heard.     No friction rub. No gallop.   Pulmonary:      Effort: Pulmonary effort is normal. No respiratory distress.      Breath sounds: Normal breath sounds. No wheezing or rales.   Abdominal:      General: Bowel sounds are normal.      Palpations: Abdomen is soft. There is no mass.      Tenderness: There is no abdominal tenderness.   Musculoskeletal:         General: No tenderness.      Cervical back: Normal range of motion and neck supple.      Right lower leg: No edema.      Left lower leg: No edema.      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    Right knee: Hypertrophic joint changes are present.  Mild tenderness is present on range-of-motion testing.   Lymphadenopathy:      Cervical: No cervical adenopathy.   Skin:     General: Skin is warm and dry.      Findings: No rash.      Comments: Wound dressings are present on both lower extremities.   Neurological:      Mental Status: She is alert and oriented to person, place, and time.   Psychiatric:         Mood and Affect: Mood normal.         Behavior: Behavior normal.               Assessment & Plan:      Yamel was seen today for follow-up.  Current therapy will be continued.  We discussed the RSV vaccine availability through the local pharmacy.    X-ray of the right knee will be obtained.  The patient will follow-up with her rheumatologist scheduled.    Diagnoses and all orders for this visit:    Essential hypertension  -      Comprehensive Metabolic Panel; Future  -     CBC Auto Differential; Future    Scleroderma with pulmonary involvement    Skin ulcers of both feet    Immunosuppression due to drug therapy  -     Comprehensive Metabolic Panel; Future  -     CBC Auto Differential; Future    Vitamin D deficiency    Right knee pain, unspecified chronicity  -     X-Ray Knee 3 View Right; Future    Other orders  -     NIFEdipine (ADALAT CC) 90 MG TbSR; Take 1 tablet (90 mg total) by mouth nightly. TAKE 1 TABLET(90 MG) BY MOUTH EVERY DAY (TAKES NIGHTLY WITH 30 MG TABLET TOTAL 120 MG)         Follow up in about 6 months (around 4/26/2024).     Aly Rand MD

## 2023-11-17 LAB
ACID FAST MOD KINY STN SPEC: NORMAL
MYCOBACTERIUM SPEC QL CULT: NORMAL

## 2023-11-20 LAB
ACID FAST MOD KINY STN SPEC: NORMAL
MYCOBACTERIUM SPEC QL CULT: NORMAL

## 2023-11-22 ENCOUNTER — TELEPHONE (OUTPATIENT)
Dept: TRANSPLANT | Facility: CLINIC | Age: 73
End: 2023-11-22
Payer: MEDICARE

## 2023-11-22 NOTE — TELEPHONE ENCOUNTER
Spoke with patient about r/s upcoming appointments to 1/2/2024      ----- Message from Nilda Benton sent at 11/22/2023 12:48 PM CST -----  Reschedule Appointment:        Type of Appt: f/u       Appt Date: 11/29    Treating Physician:  Lu       Reason for Scheduling:  will be out of town       Name of Caller: self       Contact Preference: [phone]         Additional Notes: no dates in epic

## 2023-11-24 ENCOUNTER — TELEPHONE (OUTPATIENT)
Dept: TRANSPLANT | Facility: CLINIC | Age: 73
End: 2023-11-24
Payer: MEDICARE

## 2023-11-24 NOTE — TELEPHONE ENCOUNTER
NN called and left voicemail for patient to call back. NN was calling to check in on patient after starting lasix 10 mg and to follow up on BP. Current readings from November (patient using [facility] digital program) show tht BP are 130/60's.

## 2023-12-21 ENCOUNTER — OFFICE VISIT (OUTPATIENT)
Dept: INTERNAL MEDICINE | Facility: CLINIC | Age: 73
End: 2023-12-21
Payer: MEDICARE

## 2023-12-21 VITALS
DIASTOLIC BLOOD PRESSURE: 54 MMHG | WEIGHT: 152.75 LBS | BODY MASS INDEX: 25.45 KG/M2 | HEIGHT: 65 IN | OXYGEN SATURATION: 96 % | HEART RATE: 83 BPM | SYSTOLIC BLOOD PRESSURE: 104 MMHG

## 2023-12-21 DIAGNOSIS — Z01.818 PRE-OP EXAM: Primary | ICD-10-CM

## 2023-12-21 PROCEDURE — 99999 PR PBB SHADOW E&M-EST. PATIENT-LVL V: CPT | Mod: PBBFAC,GC,,

## 2023-12-21 PROCEDURE — 99215 OFFICE O/P EST HI 40 MIN: CPT | Mod: PBBFAC

## 2023-12-21 PROCEDURE — 99213 OFFICE O/P EST LOW 20 MIN: CPT | Mod: S$PBB,GC,,

## 2023-12-21 NOTE — PROGRESS NOTES
Clinic Note          Subjective     Chief Complaint:   Chief Complaint   Patient presents with    Pre-op Exam      History of Present Illness:  Ms. Yamel Shah is a 73 y.o. female with HTN, Scleroderma complicated by ILD, and skin ulcerations who presents to the clinic for Pre-Op.     Pt scheduled for lasic surgery on 01/09/24 presenting for pre-op evaluation. Functional METS >4 with RCRI score of 0 below. Additional concerns: pt on immunosuppressant ( Cellcept) for management of scleroderma.    PRE-OP ASSESSMENT:  Active cardiac issues:  Active decompensated heart failure? No   Unstable angina?  No   Significant uncontrolled arrhythmias? No   Severe valvular heart disease-Aortic or Mitral Stenosis? No   Recent MI or coronary revascularization < 30 days? No     Cardiac Risk Factors  History of CAD/ischemic heart disease? No   History of cerebrovascular disease? No   History of compensated heart failure? No   Type 2 diabetes requiring insulin? No   Serum Creatinine > 2? No   Total cardiac risk factors 0     Functional mets >4    < 4* METs -unable to walk > 2 blocks on level ground without stopping due to symptoms  - eating, dressing, toileting, walking indoors, light housework. POOR   > 4* METs -climbing > 1 flight of stairs without stopping  -walking up hill > 1-2 blocks  -scrubbing floors  -moving furniture  - golf, bowling, dancing or tennis  -running short distance MODERATE to EXCELLENT   * performance of any one of the activities     SCELRODERMA & ILD: Follow ed by Dr Ahuja. Recent PFT showing no acute changes. Currently on mycophenolate. PRN MYKEL for ILD    /54mmHg (baseline SBP reported at 130mmHg). Current medications include nifedipine.     HLD SCREEN: Last Lipids on 10/19/23, wnl. Current medications include pravastatin (Pravachor).    DM SCREEN: Last HbA1C on 10/19/23, wnl 5.2. No current medications or  symptoms/problems.    Review of Systems   Constitutional:  Negative for chills,  diaphoresis, fever, malaise/fatigue and weight loss.   HENT:  Negative for congestion, ear pain, hearing loss and sinus pain.    Eyes:  Positive for blurred vision. Negative for pain.   Respiratory:  Negative for cough, shortness of breath and wheezing.    Cardiovascular:  Negative for chest pain, palpitations, orthopnea, claudication, leg swelling and PND.   Gastrointestinal:  Positive for heartburn. Negative for abdominal pain, constipation, diarrhea, nausea and vomiting.   Genitourinary:  Negative for dysuria and hematuria.   Musculoskeletal:  Positive for joint pain and myalgias. Negative for back pain and falls.   Skin:  Negative for itching and rash.   Neurological:  Negative for dizziness, tremors, focal weakness, weakness and headaches.   Endo/Heme/Allergies:  Negative for polydipsia.   Psychiatric/Behavioral:  Negative for depression, hallucinations, memory loss, substance abuse and suicidal ideas. The patient is not nervous/anxious and does not have insomnia.         PAST HISTORY:     Past Medical History:   Diagnosis Date    Abnormal Pap smear     Acid reflux     Allergy     Arthritis     Encounter for blood transfusion     GERD (gastroesophageal reflux disease)     Hiatal hernia 03/24/2023    History of migraine headaches     Hx of colonic polyp     Hypertension     Idiopathic neuropathy 07/20/2012    ILD (interstitial lung disease) 11/06/2013    Iron deficiency anemia 03/18/2014    MRSA carrier     Osteopenia     Pneumonia     Pulmonary fibrosis     Pulmonary hypertension     Raynaud's disease     Scleroderma, diffuse     Sjogren's syndrome     Vitamin D deficiency 11/14/2013       Past Surgical History:   Procedure Laterality Date    24 HOUR IMPEDANCE PH MONITORING OF ESOPHAGUS IN PATIENT NOT TAKING ACID REDUCING MEDICATIONS N/A 03/04/2020    Procedure: IMPEDANCE PH STUDY, ESOPHAGEAL, 24 HOUR, IN PATIENT NOT TAKING ACID REDUCING MEDICATION;  Surgeon: Annamaria Mendoza MD;  Location: [facility] ([address] [place] [address]);   Service: Endoscopy;  Laterality: N/A;  OFF PPI/H2 Blocker   Motility Studies   Hold Narcotics x 1 days   Hold TCA x 1 days  2/26 - LVM attempting to confirm appt  2/27 - Confirmed appt    ANORECTAL MANOMETRY N/A 05/10/2021    Procedure: MANOMETRY, ANORECTAL with balloon expulsion test;  Surgeon: Annamaria Mendoza MD;  Location: [facility] (4TH FLR);  Service: Endoscopy;  Laterality: N/A;  order combined  covid test 5/7 [facility], instructions emailed-[facility]    BREAST BIOPSY      Left, benign    BRONCHOSCOPY N/A 10/2/2023    Procedure: bronch;  Surgeon: Roberta Leigh DO;  Location: [facility] OR 2ND FLR;  Service: Endoscopy;  Laterality: N/A;    CERVICAL CONIZATION   W/ LASER  1970    COLONOSCOPY      COLONOSCOPY N/A 03/29/2019    Procedure: COLONOSCOPY;  Surgeon: Annamaria Mendoza MD;  Location: [facility] (2ND FLR);  Service: Endoscopy;  Laterality: N/A;    COLONOSCOPY N/A 05/10/2021    Procedure: COLONOSCOPY;  Surgeon: Annamaria Mendoza MD;  Location: [facility] (4TH FLR);  Service: Endoscopy;  Laterality: N/A;  2nd floor-previous scopes done on 2nd floor, gastroparesis  full liquid diet x2 days, clear liquid x1 day prior to procedure  covid test 5/7 [facility], instructions emailed-[facility]  5/6 pt confirmed appt-[facility]    DILATION AND CURETTAGE OF UTERUS      ESOPHAGEAL MANOMETRY WITH MEASUREMENT OF IMPEDANCE N/A 03/04/2020    Procedure: MANOMETRY, ESOPHAGUS, WITH IMPEDANCE MEASUREMENT;  Surgeon: Annamaria Mendoza MD;  Location: [facility] (4TH FLR);  Service: Endoscopy;  Laterality: N/A;  OFF PPI/H2 Blocker   Motility Studies   Hold Narcotics x 1 days   Hold TCA x 1 days    ESOPHAGOGASTRODUODENOSCOPY      ESOPHAGOGASTRODUODENOSCOPY N/A 03/29/2019    Procedure: EGD (ESOPHAGOGASTRODUODENOSCOPY);  Surgeon: Annamaria Mendoza MD;  Location: [facility] (2ND FLR);  Service: Endoscopy;  Laterality: N/A;  pulmonary htn    ESOPHAGOGASTRODUODENOSCOPY N/A 02/02/2021    Procedure: ESOPHAGOGASTRODUODENOSCOPY (EGD);  Surgeon: Annamaria Mendoza,  MD;  [facility] ([address] [place] [address]);  Service: Endoscopy;  Laterality: N/A;  2nd floor gastroparesis  3 days full liquid diet and 1 day clears  covid test 1/30 primary care, instructions sent to [facility]    ESOPHAGOGASTRODUODENOSCOPY N/A 07/01/2022    Procedure: ESOPHAGOGASTRODUODENOSCOPY (EGD);  Surgeon: Annamaria Mendoza MD;  Location: [facility] ([address] [place] [address]);  Service: Endoscopy;  Laterality: N/A;  2nd floor-gastroparesis  full liquid diet x3 days, clear liquid diet x1 day prior to procedure  fully vaccinated, instructions sent to [facility]  6/29-pt confirmed new arrival time-\[facility] [place] [facility]\""    EXCISION, LESION, STOMACH, LAPAROSCOPIC N/A 03/23/2023    Procedure: XI ROBOTIC EXCISION, LESION, STOMACH;  Surgeon: Katherine Segura MD;  Location: [address];  Service: General;  Laterality: N/A;    EXCISIONAL BIOPSY, LYMPH NODE Right 05/26/2023    Procedure: EXCISIONAL BIOPSY, LYMPH NODE, INGUINAL;  Surgeon: Katherine Segura MD;  Location: [address];  Service: General;  Laterality: Right;    HYSTERECTOMY  1990    FRANDY (AUB, Fibroids), ovaries remain    REPAIR, HERNIA, UMBILICAL N/A 03/23/2023    Procedure: REPAIR, HERNIA, UMBILICAL;  Surgeon: Katherine Segura MD;  Location: [address];  Service: General;  Laterality: N/A;    RIGHT HEART CATHETERIZATION Right 01/05/2021    Procedure: INSERTION, CATHETER, RIGHT HEART;  Surgeon: Aureliano Sy MD;  [facility];  Service: Cardiology;  Laterality: Right;    RIGHT HEART CATHETERIZATION Right 03/16/2023    Procedure: INSERTION, CATHETER, RIGHT HEART;  Surgeon: Aureliano Sy MD;  Location: [facility];  Service: Cardiology;  Laterality: Right;    ROBOT-ASSISTED LAPAROSCOPIC REPAIR OF INGUINAL HERNIA USING DA VERNELL XI Right 05/26/2023    Procedure: XI ROBOTIC REPAIR, HERNIA, INGUINAL, FEMORAL;  Surgeon: Katherine Segura MD;  Location: [address];  Service: General;  Laterality: Right;     ROBOT-ASSISTED REPAIR OF HIATAL HERNIA USING DA VERNELL XI N/A 03/23/2023    Procedure: XI ROBOTIC REPAIR, HERNIA, HIATAL;  Surgeon: Katherine Segura MD;  Location: [facility] OR 2ND FLR;  Service: General;  Laterality: N/A;    VARICOSE VEIN SURGERY      XI ROBOTIC GASTROPEXY N/A 03/23/2023    Procedure: XI ROBOTIC GASTROPEXY;  Surgeon: Katherine Segura MD;  Location: [facility] OR 2ND FLR;  Service: General;  Laterality: N/A;    XI ROBOTIC PYLOROMYOTOMY N/A 03/23/2023    Procedure: XI ROBOTIC PYLOROMYOTOMY;  Surgeon: Katherine Segura MD;  Location: [facility] OR 2ND FLR;  Service: General;  Laterality: N/A;       MEDICATIONS & ALLERGIES:     Current Outpatient Medications on File Prior to Visit   Medication Sig    albuterol (PROVENTIL) 2.5 mg /3 mL (0.083 %) nebulizer solution Take 3 mLs (2.5 mg total) by nebulization every 4 (four) hours as needed for Wheezing or Shortness of Breath (or cough). Rescue    albuterol (VENTOLIN HFA) 90 mcg/actuation inhaler Inhale 2 puffs into the lungs every 4 (four) hours as needed for Wheezing. Rescue    carboxymethylcellulose sodium (REFRESH TEARS) 0.5 % Drop Apply 1-2 drops to every as needed    ergocalciferol (ERGOCALCIFEROL) 50,000 unit Cap TAKE 1 CAPSULE BY MOUTH EVERY 7 DAYS    furosemide (LASIX) 20 MG tablet Take 0.5 tablets (10 mg total) by mouth once daily.    multivitamin capsule Take 1 capsule by mouth once daily.    mycophenolate mofetil (CELLCEPT) 200 mg/mL SusR Take 5 mLs (1,000 mg total) by mouth 2 (two) times daily.    nabumetone (RELAFEN) 750 MG tablet Take 750 mg by mouth.    nebulizer and compressor Rosemary Use as directed    NIFEdipine (ADALAT CC) 90 MG TbSR Take 1 tablet (90 mg total) by mouth nightly. TAKE 1 TABLET(90 MG) BY MOUTH EVERY DAY (TAKES NIGHTLY WITH 30 MG TABLET TOTAL 120 MG)    NIFEdipine (PROCARDIA-XL) 30 MG (OSM) 24 hr tablet TAKE 1 TABLET(30 MG) BY MOUTH EVERY DAY    pravastatin (PRAVACHOL) 20 MG tablet Take 1 tablet (20 mg total) by mouth  "every evening.    pregabalin (LYRICA) 300 MG Cap Take 1 capsule (300 mg total) by mouth 2 (two) times daily.    PREVIDENT 5000 BOOSTER PLUS 1.1 % Pste SMARTSIG:To Teeth    PREVIDENT 5000 SENSITIVE 1.1-5 % Pste BRUSH FOR 2 MINUTES TWICE PER DAY    RABEprazole (ACIPHEX) 20 mg tablet Take 1 tablet (20 mg total) by mouth 2 (two) times daily.    tadalafil (ADCIRCA) 20 mg Tab Take 2 tablets (40 mg total) by mouth once daily. (Patient taking differently: Take 40 mg by mouth every evening.)    tiZANidine (ZANAFLEX) 4 MG tablet Take 1 tablet (4 mg total) by mouth nightly as needed (muscle pain).     Current Facility-Administered Medications on File Prior to Visit   Medication    fentaNYL 50 mcg/mL injection 25 mcg    haloperidol lactate injection 0.5 mg    sodium chloride 0.9% flush 10 mL       Review of patient's allergies indicates:   Allergen Reactions    Spironolactone Tinitus    Sulfa (sulfonamide antibiotics) Rash     Other reaction(s): Rash    Tramadol Itching and Rash       OBJECTIVE:     Vitals:    12/21/23 0853   BP: (Abnormal) 104/54   BP Location: Left arm   Patient Position: Sitting   BP Method: Medium (Manual)   Pulse: 83   SpO2: 96%   Weight: 69.3 kg (152 lb 12.5 oz)   Height: 5' 5" (1.651 m)       Body mass index is 25.42 kg/m².     Physical Exam  Constitutional:       Appearance: Normal appearance.   HENT:      Head: Normocephalic and atraumatic.      Right Ear: External ear normal.      Left Ear: External ear normal.      Nose: Nose normal. No congestion.      Mouth/Throat:      Mouth: Mucous membranes are moist.   Eyes:      Extraocular Movements: Extraocular movements intact.      Conjunctiva/sclera: Conjunctivae normal.   Cardiovascular:      Rate and Rhythm: Normal rate and regular rhythm.      Pulses:           Radial pulses are 2+ on the right side and 2+ on the left side.      Heart sounds: Normal heart sounds.   Pulmonary:      Effort: Pulmonary effort is normal. No respiratory distress.      Breath " sounds: Examination of the right-upper field reveals wheezing. Examination of the left-upper field reveals wheezing. Examination of the right-middle field reveals wheezing. Examination of the left-middle field reveals wheezing. Examination of the right-lower field reveals wheezing and rales. Examination of the left-lower field reveals wheezing and rales. Wheezing and rales present.   Musculoskeletal:      Right lower leg: No edema.      Left lower leg: No edema.      Comments: Claw hand deformity with flexion contractures bilaterally   Skin:     Comments: Tightening of the skin   Neurological:      General: No focal deficit present.      Mental Status: She is alert and oriented to person, place, and time.   Psychiatric:         Mood and Affect: Mood normal.         Behavior: Behavior normal.         Thought Content: Thought content normal.         Judgment: Judgment normal.          ASSESSMENT & PLAN:   Ms. Yamel Shah is a 73 y.o. female who was seen today in clinic for pre-op examination for future lasic surgery. Surgery is low risk and will be performed by an outside physician. Will complete pre-op paper work and fax to provider.  Recommend rheumatology verification of holding Cellcept one week before and after the date of surgery. May otherwise proceed to surgery on 01/09/24.      1. Pre-op exam  Assessment & Plan:  Non-emergent surgery.  No active cardiac problems (such as unstable angina, decompensated heart failure, significant uncontrolled arrhythmias or severe valvular disease).  Surgery is low risk  Functional Status: able to climb a flight of stairs (> 4 METS)    Revised Cardiac Risk Index   1. History of ischemic heart disease   2. History of congestive heart failure   3. History of cerebrovascular disease (stroke or transient ischemic attack)   4. History of diabetes requiring preoperative insulin use 5. Chronic kidney disease (creatinine > 2 mg/dL)   5. Undergoing suprainguinal vascular,  intraperitoneal, or intrathoracic surgery Risk for cardiac death, nonfatal myocardial infarction, and nonfatal cardiac arrest     0 predictors = 0.4%, 1 predictor = 0.9%, 2 predictors = 6.6%, ?3 predictors = >11%    RCRI Calculator Class and Risk percentage:    0.4%          Other Issues:   Immunotherapy with Cellcept: Cellcept previous held one week prior to surgery and restarted one week after surgery. Verify with Rheumatology.    Recommendation:  1. Proceed to Surgery.           1. Pre-op exam         Discussed with Dr Andrea - staff attestation to follow        Adria Bearden MD  Internal Medicine, PGY-3  [facility]  [address] [place], [place] [id number] [phone]

## 2023-12-21 NOTE — PROGRESS NOTES
73F with scleroderma ILD, HTN presenting for pre-op risk stratification for lasic surgery.     Scleroderma / ILD / pHTN- follows with Dr. Ahuja for scleroderma and Advanced Lung Disease for pHTN. She is on MMF immunosuppression and tadalafil for pHTN.   Patient to confirm with her rheumatologist how long before/after surgery she will hold her immunosupression. She does not have evidence of decompensation at this time.     Pre-op risk stratification - see resident note.

## 2023-12-21 NOTE — PATIENT INSTRUCTIONS
Non-emergent surgery.  No active cardiac problems (such as unstable angina, decompensated heart failure, significant uncontrolled arrhythmias or severe valvular disease).  Surgery is low risk  Functional Status: Good (> 4 METS)    RCRI Calculator Class and Risk percentage:    0.4%          Recommendation:  1. Proceed to Surgery.    Other Issues:   Immunotherapy with Cellcept: Cellcept previous held one week prior to surgery and restarted one week after surgery. Verify with Rheumatology.

## 2023-12-21 NOTE — ASSESSMENT & PLAN NOTE
Non-emergent surgery.  No active cardiac problems (such as unstable angina, decompensated heart failure, significant uncontrolled arrhythmias or severe valvular disease).  Surgery is low risk  Functional Status: able to climb a flight of stairs (> 4 METS)    Revised Cardiac Risk Index   1. History of ischemic heart disease   2. History of congestive heart failure   3. History of cerebrovascular disease (stroke or transient ischemic attack)   4. History of diabetes requiring preoperative insulin use 5. Chronic kidney disease (creatinine > 2 mg/dL)   5. Undergoing suprainguinal vascular, intraperitoneal, or intrathoracic surgery Risk for cardiac death, nonfatal myocardial infarction, and nonfatal cardiac arrest     0 predictors = 0.4%, 1 predictor = 0.9%, 2 predictors = 6.6%, ?3 predictors = >11%    RCRI Calculator Class and Risk percentage:    0.4%          Other Issues:   Immunotherapy with Cellcept: Cellcept previous held one week prior to surgery and restarted one week after surgery. Verify with Rheumatology.    Recommendation:  1. Proceed to Surgery.

## 2023-12-28 ENCOUNTER — TELEPHONE (OUTPATIENT)
Dept: TRANSPLANT | Facility: CLINIC | Age: 73
End: 2023-12-28
Payer: MEDICARE

## 2023-12-28 NOTE — TELEPHONE ENCOUNTER
Rescheduled appt to 01/30/2024.    ----- Message from Poppy Montaño sent at 12/28/2023  1:50 PM CST -----  Regarding: Appt  Contact: Pt [phone]  Pt is calling to reschedule appt and testing on 1/2 please call

## 2023-12-31 ENCOUNTER — HOSPITAL ENCOUNTER (EMERGENCY)
Facility: OTHER | Age: 73
Discharge: HOME | End: 2023-12-31
Attending: EMERGENCY MEDICINE
Payer: MEDICARE

## 2023-12-31 VITALS
HEIGHT: 65 IN | HEART RATE: 93 BPM | RESPIRATION RATE: 18 BRPM | SYSTOLIC BLOOD PRESSURE: 120 MMHG | BODY MASS INDEX: 25.83 KG/M2 | TEMPERATURE: 99 F | WEIGHT: 155 LBS | OXYGEN SATURATION: 100 % | DIASTOLIC BLOOD PRESSURE: 58 MMHG

## 2023-12-31 DIAGNOSIS — R06.02 SOB (SHORTNESS OF BREATH): ICD-10-CM

## 2023-12-31 DIAGNOSIS — J84.10 PULMONARY FIBROSIS: ICD-10-CM

## 2023-12-31 DIAGNOSIS — J18.9 ATYPICAL PNEUMONIA: Primary | ICD-10-CM

## 2023-12-31 LAB
ALBUMIN SERPL BCP-MCNC: 2.6 G/DL (ref 3.5–5.2)
ALP SERPL-CCNC: 126 U/L (ref 55–135)
ALT SERPL W/O P-5'-P-CCNC: 14 U/L (ref 10–44)
ANION GAP SERPL CALC-SCNC: 10 MMOL/L (ref 8–16)
AST SERPL-CCNC: 31 U/L (ref 10–40)
BASOPHILS # BLD AUTO: 0.03 K/UL (ref 0–0.2)
BASOPHILS NFR BLD: 0.2 % (ref 0–1.9)
BILIRUB SERPL-MCNC: 1.1 MG/DL (ref 0.1–1)
BNP SERPL-MCNC: 180 PG/ML (ref 0–99)
BUN SERPL-MCNC: 21 MG/DL (ref 8–23)
CALCIUM SERPL-MCNC: 8.3 MG/DL (ref 8.7–10.5)
CHLORIDE SERPL-SCNC: 104 MMOL/L (ref 95–110)
CO2 SERPL-SCNC: 18 MMOL/L (ref 23–29)
CREAT SERPL-MCNC: 1 MG/DL (ref 0.5–1.4)
CTP QC/QA: YES
CTP QC/QA: YES
DIFFERENTIAL METHOD BLD: ABNORMAL
EOSINOPHIL # BLD AUTO: 0 K/UL (ref 0–0.5)
EOSINOPHIL NFR BLD: 0.2 % (ref 0–8)
ERYTHROCYTE [DISTWIDTH] IN BLOOD BY AUTOMATED COUNT: 15.7 % (ref 11.5–14.5)
EST. GFR  (NO RACE VARIABLE): 60 ML/MIN/1.73 M^2
GLUCOSE SERPL-MCNC: 148 MG/DL (ref 70–110)
HCT VFR BLD AUTO: 30.9 % (ref 37–48.5)
HGB BLD-MCNC: 10 G/DL (ref 12–16)
IMM GRANULOCYTES # BLD AUTO: 0.07 K/UL (ref 0–0.04)
IMM GRANULOCYTES NFR BLD AUTO: 0.6 % (ref 0–0.5)
LYMPHOCYTES # BLD AUTO: 0.6 K/UL (ref 1–4.8)
LYMPHOCYTES NFR BLD: 5.1 % (ref 18–48)
MCH RBC QN AUTO: 27 PG (ref 27–31)
MCHC RBC AUTO-ENTMCNC: 32.4 G/DL (ref 32–36)
MCV RBC AUTO: 84 FL (ref 82–98)
MONOCYTES # BLD AUTO: 1.1 K/UL (ref 0.3–1)
MONOCYTES NFR BLD: 9 % (ref 4–15)
NEUTROPHILS # BLD AUTO: 10.2 K/UL (ref 1.8–7.7)
NEUTROPHILS NFR BLD: 84.9 % (ref 38–73)
NRBC BLD-RTO: 0 /100 WBC
PLATELET # BLD AUTO: 220 K/UL (ref 150–450)
PMV BLD AUTO: 10.7 FL (ref 9.2–12.9)
POC MOLECULAR INFLUENZA A AGN: NEGATIVE
POC MOLECULAR INFLUENZA B AGN: NEGATIVE
POTASSIUM SERPL-SCNC: 4.1 MMOL/L (ref 3.5–5.1)
PROT SERPL-MCNC: 8 G/DL (ref 6–8.4)
RBC # BLD AUTO: 3.7 M/UL (ref 4–5.4)
SARS-COV-2 RDRP RESP QL NAA+PROBE: NEGATIVE
SODIUM SERPL-SCNC: 132 MMOL/L (ref 136–145)
TROPONIN I SERPL DL<=0.01 NG/ML-MCNC: 0.01 NG/ML (ref 0–0.03)
WBC # BLD AUTO: 12.07 K/UL (ref 3.9–12.7)

## 2023-12-31 PROCEDURE — 80053 COMPREHEN METABOLIC PANEL: CPT | Mod: NTX | Performed by: EMERGENCY MEDICINE

## 2023-12-31 PROCEDURE — 85025 COMPLETE CBC W/AUTO DIFF WBC: CPT | Mod: NTX | Performed by: EMERGENCY MEDICINE

## 2023-12-31 PROCEDURE — 84484 ASSAY OF TROPONIN QUANT: CPT | Mod: NTX | Performed by: EMERGENCY MEDICINE

## 2023-12-31 PROCEDURE — 93005 ELECTROCARDIOGRAM TRACING: CPT | Mod: NTX | Performed by: INTERNAL MEDICINE

## 2023-12-31 PROCEDURE — 99285 EMERGENCY DEPT VISIT HI MDM: CPT | Mod: 25,NTX

## 2023-12-31 PROCEDURE — 94640 AIRWAY INHALATION TREATMENT: CPT | Mod: NTX

## 2023-12-31 PROCEDURE — 94761 N-INVAS EAR/PLS OXIMETRY MLT: CPT | Mod: NTX

## 2023-12-31 PROCEDURE — 93005 ELECTROCARDIOGRAM TRACING: CPT | Mod: NTX

## 2023-12-31 PROCEDURE — 87635 SARS-COV-2 COVID-19 AMP PRB: CPT | Mod: NTX | Performed by: EMERGENCY MEDICINE

## 2023-12-31 PROCEDURE — 25000242 PHARM REV CODE 250 ALT 637 W/ HCPCS: Mod: NTX | Performed by: EMERGENCY MEDICINE

## 2023-12-31 PROCEDURE — 63600175 PHARM REV CODE 636 W HCPCS: Mod: NTX | Performed by: EMERGENCY MEDICINE

## 2023-12-31 PROCEDURE — 83880 ASSAY OF NATRIURETIC PEPTIDE: CPT | Mod: NTX | Performed by: EMERGENCY MEDICINE

## 2023-12-31 PROCEDURE — 93010 ELECTROCARDIOGRAM REPORT: CPT | Mod: NTX,,, | Performed by: INTERNAL MEDICINE

## 2023-12-31 PROCEDURE — 96374 THER/PROPH/DIAG INJ IV PUSH: CPT | Mod: NTX

## 2023-12-31 RX ORDER — ALBUTEROL SULFATE 2.5 MG/.5ML
2.5 SOLUTION RESPIRATORY (INHALATION) EVERY 4 HOURS PRN
Qty: 30 EACH | Refills: 0 | Status: SHIPPED | OUTPATIENT
Start: 2023-12-31 | End: 2024-12-30

## 2023-12-31 RX ORDER — PROMETHAZINE HYDROCHLORIDE AND DEXTROMETHORPHAN HYDROBROMIDE 6.25; 15 MG/5ML; MG/5ML
5 SYRUP ORAL EVERY 4 HOURS PRN
Qty: 118 ML | Refills: 0 | Status: SHIPPED | OUTPATIENT
Start: 2023-12-31 | End: 2024-01-10

## 2023-12-31 RX ORDER — AZITHROMYCIN 250 MG/1
250 TABLET, FILM COATED ORAL DAILY
Qty: 6 TABLET | Refills: 0 | Status: SHIPPED | OUTPATIENT
Start: 2023-12-31 | End: 2024-01-25

## 2023-12-31 RX ORDER — IPRATROPIUM BROMIDE AND ALBUTEROL SULFATE 2.5; .5 MG/3ML; MG/3ML
3 SOLUTION RESPIRATORY (INHALATION)
Status: COMPLETED | OUTPATIENT
Start: 2023-12-31 | End: 2023-12-31

## 2023-12-31 RX ORDER — FUROSEMIDE 10 MG/ML
20 INJECTION INTRAMUSCULAR; INTRAVENOUS
Status: COMPLETED | OUTPATIENT
Start: 2023-12-31 | End: 2023-12-31

## 2023-12-31 RX ORDER — PREDNISONE 20 MG/1
40 TABLET ORAL DAILY
Qty: 10 TABLET | Refills: 0 | Status: SHIPPED | OUTPATIENT
Start: 2023-12-31 | End: 2024-01-05

## 2023-12-31 RX ORDER — ALBUTEROL SULFATE 90 UG/1
2 AEROSOL, METERED RESPIRATORY (INHALATION) EVERY 6 HOURS PRN
Qty: 8 G | Refills: 0 | Status: SHIPPED | OUTPATIENT
Start: 2023-12-31

## 2023-12-31 RX ADMIN — IPRATROPIUM BROMIDE AND ALBUTEROL SULFATE 3 ML: 2.5; .5 SOLUTION RESPIRATORY (INHALATION) at 09:12

## 2023-12-31 RX ADMIN — FUROSEMIDE 20 MG: 10 INJECTION, SOLUTION INTRAMUSCULAR; INTRAVENOUS at 08:12

## 2023-12-31 NOTE — ED PROVIDER NOTES
Encounter Date: 12/31/2023       History     Chief Complaint   Patient presents with    Cough     X 5 days. Pt also c/o SOB. Pt states she was not able to sleep last night r/t coughing      73-year-old female with history of pulmonary fibrosis, scleroderma, pulmonary hypertension Sjogren syndrome presents emergency department complaining of upper respiratory congestion, cough and malaise for the past 3 or 4 days.  She reports she may be having fever at home as well.  She denies any GI symptoms no vomiting or diarrhea.  She denies any chest pain except with coughing.  Her  has been giving her medication home with no relief including multiple over-the-counter medications Tessalon and Phenergan with codeine.  Patient reports exertional dyspnea.  Her cough was worse overnight last night.  She has previously been treated with Lasix for pulmonary edema.  She is on CellCept daily.  Denies sick contacts.  Is more active than usual during [place].     The history is provided by the patient.     Review of patient's allergies indicates:   Allergen Reactions    Spironolactone Tinitus    Sulfa (sulfonamide antibiotics) Rash     Other reaction(s): Rash    Tramadol Itching and Rash     Past Medical History:   Diagnosis Date    Abnormal Pap smear     Acid reflux     Allergy     Arthritis     Encounter for blood transfusion     GERD (gastroesophageal reflux disease)     Hiatal hernia 03/24/2023    History of migraine headaches     Hx of colonic polyp     Hypertension     Idiopathic neuropathy 07/20/2012    ILD (interstitial lung disease) 11/06/2013    Iron deficiency anemia 03/18/2014    MRSA carrier     Osteopenia     Pneumonia     Pulmonary fibrosis     Pulmonary hypertension     Raynaud's disease     Scleroderma, diffuse     Sjogren's syndrome     Vitamin D deficiency 11/14/2013     Past Surgical History:   Procedure Laterality Date    24 HOUR IMPEDANCE PH MONITORING OF ESOPHAGUS IN PATIENT NOT TAKING ACID REDUCING  MEDICATIONS N/A 03/04/2020    Procedure: IMPEDANCE PH STUDY, ESOPHAGEAL, 24 HOUR, IN PATIENT NOT TAKING ACID REDUCING MEDICATION;  Surgeon: Annamaria Mendoza MD;  Location: [facility] (4TH FLR);  Service: Endoscopy;  Laterality: N/A;  OFF PPI/H2 Blocker   Motility Studies   Hold Narcotics x 1 days   Hold TCA x 1 days  2/26 - LVM attempting to confirm appt  2/27 - Confirmed appt    ANORECTAL MANOMETRY N/A 05/10/2021    Procedure: MANOMETRY, ANORECTAL with balloon expulsion test;  Surgeon: Annamaria Mendoza MD;  Location: [facility] (4TH FLR);  Service: Endoscopy;  Laterality: N/A;  order combined  covid test 5/7 [facility], instructions emailed-[facility]    BREAST BIOPSY      Left, benign    BRONCHOSCOPY N/A 10/2/2023    Procedure: bronch;  Surgeon: Roberta Leigh DO;  Location: [facility] OR 2ND FLR;  Service: Endoscopy;  Laterality: N/A;    CERVICAL CONIZATION   W/ LASER  1970    COLONOSCOPY      COLONOSCOPY N/A 03/29/2019    Procedure: COLONOSCOPY;  Surgeon: Annamaria Mendoza MD;  Location: [facility] (2ND FLR);  Service: Endoscopy;  Laterality: N/A;    COLONOSCOPY N/A 05/10/2021    Procedure: COLONOSCOPY;  Surgeon: Annamaria Mendoza MD;  Location: [facility] (4TH FLR);  Service: Endoscopy;  Laterality: N/A;  2nd floor-previous scopes done on 2nd floor, gastroparesis  full liquid diet x2 days, clear liquid x1 day prior to procedure  covid test 5/7 [facility], instructions emailed-[facility]  5/6 pt confirmed appt-vt    DILATION AND CURETTAGE OF UTERUS      ESOPHAGEAL MANOMETRY WITH MEASUREMENT OF IMPEDANCE N/A 03/04/2020    Procedure: MANOMETRY, ESOPHAGUS, WITH IMPEDANCE MEASUREMENT;  Surgeon: Annamaria Mendoza MD;  Location: [facility] (4TH FLR);  Service: Endoscopy;  Laterality: N/A;  OFF PPI/H2 Blocker   Motility Studies   Hold Narcotics x 1 days   Hold TCA x 1 days    ESOPHAGOGASTRODUODENOSCOPY      ESOPHAGOGASTRODUODENOSCOPY N/A 03/29/2019    Procedure: EGD (ESOPHAGOGASTRODUODENOSCOPY);  Surgeon: Annamaria Mendoza MD;  [facility]  Service: Endoscopy;  Laterality: N/A;  pulmonary htn    ESOPHAGOGASTRODUODENOSCOPY N/A 02/02/2021    Procedure: ESOPHAGOGASTRODUODENOSCOPY (EGD);  Surgeon: Annamaria Mendoza MD;  [facility]  Service: Endoscopy;  Laterality: N/A;  2nd floor gastroparesis  3 days full liquid diet and 1 day clears  covid test 1/30 primary care, instructions sent to [facility]    ESOPHAGOGASTRODUODENOSCOPY N/A 07/01/2022    Procedure: ESOPHAGOGASTRODUODENOSCOPY (EGD);  Surgeon: Annamaria Mendoza MD;  [facility]  Service: Endoscopy;  Laterality: N/A;  2nd floor-gastroparesis  full liquid diet x3 days, clear liquid diet x1 day prior to procedure  fully vaccinated, instructions sent to [facility]  6/29-pt confirmed new arrival time-[facility]    EXCISION, LESION, STOMACH, LAPAROSCOPIC N/A 03/23/2023    Procedure: XI ROBOTIC EXCISION, LESION, STOMACH;  Surgeon: Katherine Segura MD;  Location: [facility] OR [address] [place] [address];  Service: General;  Laterality: N/A;    EXCISIONAL BIOPSY, LYMPH NODE Right 05/26/2023    Procedure: EXCISIONAL BIOPSY, LYMPH NODE, INGUINAL;  Surgeon: Katherine Segura MD;  Location: [facility] OR [address] [place] [address];  Service: General;  Laterality: Right;    HYSTERECTOMY  1990    FRANDY (AUB, Fibroids), ovaries remain    REPAIR, HERNIA, UMBILICAL N/A 03/23/2023    Procedure: REPAIR, HERNIA, UMBILICAL;  Surgeon: Katherine Segura MD;  Location: [facility] OR [address] [place] [address];  Service: General;  Laterality: N/A;    RIGHT HEART CATHETERIZATION Right 01/05/2021    Procedure: INSERTION, CATHETER, RIGHT HEART;  Surgeon: Aureliano Sy MD;  Location: [facility] CATH LAB;  Service: Cardiology;  Laterality: Right;    RIGHT HEART CATHETERIZATION Right 03/16/2023    Procedure: INSERTION, CATHETER, RIGHT HEART;  Surgeon: Aureliano yS MD;  Location: [facility] CATH LAB;  Service: Cardiology;  Laterality: Right;    ROBOT-ASSISTED LAPAROSCOPIC REPAIR OF INGUINAL HERNIA USING DA VERNELL XI  Right 05/26/2023    Procedure: XI ROBOTIC REPAIR, HERNIA, INGUINAL, FEMORAL;  Surgeon: Katherine Segura MD;  Location: [facility] OR 2ND FLR;  Service: General;  Laterality: Right;    ROBOT-ASSISTED REPAIR OF HIATAL HERNIA USING DA VERNELL XI N/A 03/23/2023    Procedure: XI ROBOTIC REPAIR, HERNIA, HIATAL;  Surgeon: Katherine Segura MD;  Location: [facility] OR 2ND FLR;  Service: General;  Laterality: N/A;    VARICOSE VEIN SURGERY      XI ROBOTIC GASTROPEXY N/A 03/23/2023    Procedure: XI ROBOTIC GASTROPEXY;  Surgeon: Katherine Segura MD;  Location: [facility] OR 2ND FLR;  Service: General;  Laterality: N/A;    XI ROBOTIC PYLOROMYOTOMY N/A 03/23/2023    Procedure: XI ROBOTIC PYLOROMYOTOMY;  Surgeon: Katherine Segura MD;  Location: [facility] OR 2ND FLR;  Service: General;  Laterality: N/A;     Family History   Problem Relation Age of Onset    Breast cancer Mother     Cancer Mother         Breast    Hypertension Father     Diabetes Father         Amputation of legs    Breast cancer Sister     Diabetes Sister     Arthritis Sister     Cancer Sister         Breast    Osteoarthritis Brother     Diabetes Brother     Arthritis Brother     Birth defects Brother         Polio at birth, recently had knee replacement surgery on left knee    No Known Problems Daughter     No Known Problems Daughter     No Known Problems Son     No Known Problems Son     Breast cancer Maternal Aunt     Cancer Maternal Aunt         Breast    Early death Sister     Melanoma Neg Hx     Colon cancer Neg Hx     Crohn's disease Neg Hx     Stomach cancer Neg Hx     Ulcerative colitis Neg Hx     Rectal cancer Neg Hx     Irritable bowel syndrome Neg Hx     Esophageal cancer Neg Hx     Celiac disease Neg Hx     Ovarian cancer Neg Hx     Liver cancer Neg Hx     Pancreatic cancer Neg Hx      Social History     Tobacco Use    Smoking status: Never     Passive exposure: Never    Smokeless tobacco: Never   Substance Use Topics    Alcohol use: Never      Comment: wine occasionally    Drug use: No     Review of Systems   Constitutional:  Positive for fatigue and fever.   HENT:  Negative for sore throat.    Respiratory:  Positive for cough, shortness of breath and wheezing.    Cardiovascular:  Negative for chest pain.   Gastrointestinal:  Negative for nausea.   Genitourinary:  Negative for dysuria.   Musculoskeletal:  Negative for back pain.   Skin:  Negative for color change, rash and wound.   Neurological:  Positive for weakness.   Hematological:  Does not bruise/bleed easily.   All other systems reviewed and are negative.      Physical Exam     Initial Vitals [12/31/23 0604]   BP Pulse Resp Temp SpO2   (!) 122/58 97 16 98.9 °F (37.2 °C) 99 %      MAP       --         Physical Exam    Nursing note and vitals reviewed.  Constitutional: She appears well-developed and well-nourished. No distress.   HENT:   Head: Normocephalic and atraumatic.   Right Ear: External ear normal.   Left Ear: External ear normal.   Eyes: Conjunctivae and EOM are normal. Pupils are equal, round, and reactive to light. Right eye exhibits no discharge. Left eye exhibits no discharge. No scleral icterus.   Neck: Neck supple.   Normal range of motion.  Cardiovascular:  Normal rate, regular rhythm and normal heart sounds.     Exam reveals no gallop and no friction rub.       No murmur heard.  Pulmonary/Chest: No stridor. No respiratory distress. She has wheezes. She has no rhonchi. She has rales.   Abdominal: Abdomen is soft. Bowel sounds are normal. She exhibits no distension and no mass. There is no abdominal tenderness. There is no rebound and no guarding.   Musculoskeletal:         General: No edema. Normal range of motion.      Cervical back: Normal range of motion and neck supple.      Comments: Contracture of bilateral hands     Neurological: She is alert and oriented to person, place, and time. She has normal strength. No cranial nerve deficit or sensory deficit. GCS score is 15. GCS eye  subscore is 4. GCS verbal subscore is 5. GCS motor subscore is 6.   Skin: Skin is warm and dry. Capillary refill takes less than 2 seconds.   Psychiatric: She has a normal mood and affect. Her behavior is normal. Judgment and thought content normal.         ED Course   Procedures  Labs Reviewed   CBC W/ AUTO DIFFERENTIAL - Abnormal; Notable for the following components:       Result Value    RBC 3.70 (*)     Hemoglobin 10.0 (*)     Hematocrit 30.9 (*)     RDW 15.7 (*)     Immature Granulocytes 0.6 (*)     Gran # (ANC) 10.2 (*)     Immature Grans (Abs) 0.07 (*)     Lymph # 0.6 (*)     Mono # 1.1 (*)     Gran % 84.9 (*)     Lymph % 5.1 (*)     All other components within normal limits   COMPREHENSIVE METABOLIC PANEL - Abnormal; Notable for the following components:    Sodium 132 (*)     CO2 18 (*)     Glucose 148 (*)     Calcium 8.3 (*)     Albumin 2.6 (*)     Total Bilirubin 1.1 (*)     All other components within normal limits   B-TYPE NATRIURETIC PEPTIDE - Abnormal; Notable for the following components:     (*)     All other components within normal limits   TROPONIN I   SARS-COV-2 RDRP GENE   POCT INFLUENZA A/B MOLECULAR     EKG Readings: (Independently Interpreted)   Initial Reading: No STEMI. Rhythm: Normal Sinus Rhythm. Heart Rate: 89. Ectopy: No Ectopy. Axis: Left Axis Deviation.       Imaging Results              X-Ray Chest AP Portable (Final result)  Result time 12/31/23 07:55:15      Final result by Kolton Agudelo MD (12/31/23 07:55:15)                   Impression:      Findings suggestive of chronic interstitial lung disease with possible new ground-glass or airspace opacities in the perihilar and lower lung zones.  Suggest correlation for superimposed pulmonary edema or infectious/inflammatory process.  Follow-up PA and lateral views may provide improved sensitivity if there is persistent clinical concern.      Electronically signed by: Kolton Agudelo MD  Date:    12/31/2023  Time:    07:55  "              Narrative:    EXAMINATION:  XR CHEST AP PORTABLE    CLINICAL HISTORY:  Provided history is "CHF;  ".    TECHNIQUE:  One view of the chest.    COMPARISON:  09/23/2023.    FINDINGS:  Cardiomediastinal silhouette is prominent and similar to prior study, potentially exaggerated by portable technique.  Atherosclerotic calcifications overlie the aortic arch.  There are coarse interstitial lung markings with chronic appearing interstitial changes throughout both lungs, better evaluated on prior chest CT dated 09/08/2023.  Suspect new/worse patchy perihilar and lower lung zone ground-glass and airspace opacities, possibly exaggerated by soft tissue attenuation of the x-ray beam, low lung volumes, and portable technique.  Upper lungs are relatively stable with chronic interstitial changes but no focal consolidation.  No large pleural effusion.  No distinct pneumothorax.                                    X-Rays:   Independently Interpreted Readings:   Chest X-Ray: Increased vascular markings consistent with CHF are present. There is an infiltrate in the RLL and LLL.     Medications   furosemide injection 20 mg (20 mg Intravenous Given 12/31/23 0849)   albuterol-ipratropium 2.5 mg-0.5 mg/3 mL nebulizer solution 3 mL (3 mLs Nebulization Given 12/31/23 0945)     Medical Decision Making  73-year-old female presents emergency department with upper and lower respiratory symptoms for the past several days worsening.  Patient is ill-appearing but nontoxic.  She has multiple comorbidities including pulmonary fibrosis and scleroderma.  She is on chronic CellCept due to her autoimmune disease.  Afebrile in the emergency department.  Plan septic workup, COVID and flu swabs.     Amount and/or Complexity of Data Reviewed  Labs: ordered.  Radiology: ordered.  Discussion of management or test interpretation with external provider(s): Patient with reassuring lab work overall with mildly elevated BNP and borderline white blood " cell count.  Chest x-ray shows bilateral lower haziness with ground-glass opacification concerning for atypical infection or small amount of superimposed fluid.  Will give patient a dose of Lasix as well as prescribe steroids and sent home for atypical infection.  Patient was advised to return for worsening symptoms.  Will also give symptomatic treatment with bronchodilators and cough medication.  Patient is not hypoxic and does not appear to need admission at this point.  However, I urged caution return for worsening given comorbidities.     Risk  Prescription drug management.  Decision regarding hospitalization.                                      Clinical Impression:  Final diagnoses:  [R06.02] SOB (shortness of breath)  [J84.10] Pulmonary fibrosis (Primary)  [J18.9] Atypical pneumonia          ED Disposition Condition    Discharge           ED Prescriptions       Medication Sig Dispense Start Date End Date Auth. Provider    predniSONE (DELTASONE) 20 MG tablet Take 2 tablets (40 mg total) by mouth once daily. for 5 days 10 tablet 12/31/2023 1/5/2024 Anay Cruz MD    albuterol (VENTOLIN HFA) 90 mcg/actuation inhaler Inhale 2 puffs into the lungs every 6 (six) hours as needed for Wheezing. Rescue 8 g 12/31/2023 -- Anay Cruz MD    albuterol sulfate 2.5 mg/0.5 mL Nebu Take 2.5 mg by nebulization every 4 (four) hours as needed (shortness of breath/wheezing). Rescue 30 each 12/31/2023 12/30/2024 Anay Cruz MD    azithromycin (Z-IMELDA) 250 MG tablet Take 1 tablet (250 mg total) by mouth once daily. Take first 2 tablets together, then 1 every day until finished. 6 tablet 12/31/2023 -- Anay Cruz MD    promethazine-dextromethorphan (PROMETHAZINE-DM) 6.25-15 mg/5 mL Syrp Take 5 mLs by mouth every 4 (four) hours as needed (COUGH). 118 mL 12/31/2023 1/10/2024 Anay Cruz MD          Follow-up Information       Follow up With Specialties Details Why Contact Info    Aly Rand MD Internal Medicine  Schedule an appointment as soon as possible for a visit   [address] [facility]  [place] [address]  [phone]               Anay Cruz MD  12/31/23 5470

## 2024-01-08 NOTE — PROGRESS NOTES
I have reviewed the notes, assessments, and/or procedures performed by Dr. Bearden, I concur with her/his documentation of Yamel Shah.  Date of Service: 12/21/2023

## 2024-01-11 DIAGNOSIS — M34.89 CUTANEOUS SCLERODERMA: ICD-10-CM

## 2024-01-11 DIAGNOSIS — G60.9 IDIOPATHIC NEUROPATHY: ICD-10-CM

## 2024-01-11 DIAGNOSIS — G89.4 CHRONIC PAIN DISORDER: ICD-10-CM

## 2024-01-11 RX ORDER — PREGABALIN 300 MG/1
300 CAPSULE ORAL 2 TIMES DAILY
Qty: 60 CAPSULE | Refills: 6 | Status: SHIPPED | OUTPATIENT
Start: 2024-01-11 | End: 2024-07-11

## 2024-01-17 ENCOUNTER — TELEPHONE (OUTPATIENT)
Dept: TRANSPLANT | Facility: CLINIC | Age: 74
End: 2024-01-17
Payer: MEDICARE

## 2024-01-17 NOTE — TELEPHONE ENCOUNTER
"Spoke with Ms. Shah - wants to reschedule appts to early March due to cateract surgery . Rescheduled to  03/04/2024.    ----- Message from Nilda Benton sent at 1/17/2024  2:26 PM CST -----  Consult/Advisory:      Name Of Caller: Self    Contact Preference:   [phone]       What is the nature of the call?: Pt called regarding appt on 1/30. Pt stated that she cannot make appt therefore would like to r/s. Please advised       Additional Notes: No dates in epic to r/s appt         "Thank you for all that you do for our patients"          "

## 2024-01-19 ENCOUNTER — TELEPHONE (OUTPATIENT)
Dept: INTERNAL MEDICINE | Facility: CLINIC | Age: 74
End: 2024-01-19
Payer: MEDICARE

## 2024-01-22 ENCOUNTER — TELEPHONE (OUTPATIENT)
Dept: INTERNAL MEDICINE | Facility: CLINIC | Age: 74
End: 2024-01-22
Payer: MEDICARE

## 2024-01-22 NOTE — TELEPHONE ENCOUNTER
Reached her and booked cataract preop for 11:20  This coming Thursday.    Direction given.  Expressed understanding.

## 2024-01-22 NOTE — TELEPHONE ENCOUNTER
----- Message from Edilma Barraza sent at 1/22/2024 10:09 AM CST -----  Contact: [phone]  Patient is returning a phone call.  Who left a message for the patient: Gabriela Johnson,  Does patient know what this is regarding:   pre op   Would you like a call back, or a response through your [facility] portal?:   call back  Comments:

## 2024-01-24 ENCOUNTER — TELEPHONE (OUTPATIENT)
Dept: INTERNAL MEDICINE | Facility: CLINIC | Age: 74
End: 2024-01-24
Payer: MEDICARE

## 2024-01-25 ENCOUNTER — OFFICE VISIT (OUTPATIENT)
Dept: INTERNAL MEDICINE | Facility: CLINIC | Age: 74
End: 2024-01-25
Payer: MEDICARE

## 2024-01-25 VITALS
DIASTOLIC BLOOD PRESSURE: 68 MMHG | HEIGHT: 65 IN | SYSTOLIC BLOOD PRESSURE: 134 MMHG | BODY MASS INDEX: 25.64 KG/M2 | TEMPERATURE: 99 F | WEIGHT: 153.88 LBS | RESPIRATION RATE: 19 BRPM

## 2024-01-25 DIAGNOSIS — Z01.818 PRE-OP EXAM: Primary | ICD-10-CM

## 2024-01-25 PROCEDURE — 99999 PR PBB SHADOW E&M-EST. PATIENT-LVL IV: CPT | Mod: PBBFAC,GC,,

## 2024-01-25 PROCEDURE — 99214 OFFICE O/P EST MOD 30 MIN: CPT | Mod: PBBFAC,PO

## 2024-01-25 PROCEDURE — 99213 OFFICE O/P EST LOW 20 MIN: CPT | Mod: S$PBB,GC,,

## 2024-01-25 NOTE — PROGRESS NOTES
73 y.o. female presents for preop  Surgery: Cataract Surgery  Surgeon: Dr. Camila Burdick  Date: 01/30/2024      ROS:  No fever, chills , or night sweats  No visual disturbance or d/c  No ear or sinus pressure or pain  No sore throat or dysphagia  No cough or wheezing  No chest pain or palpitations  No GERD or abdominal pain  No diarrhea or blood in stool  No dysuria or hematuria  No cold or heat intolerance  No increased thirst or urination  No HA or focal deficits  No skin rashes or lesions  No unusual bruising or bleeding or clotting  No problem w/ anesthesia in the past  Remainder of review negative except as previously noted    PMHX: Reviewed  PSHX:Reviewed  SHX:Reviewed  FHX: Reviewed    PE:  VSS:  GEN:WDWN, A&O, NAD, conversant and co-operative  EYES: Conj/lids unremarkable, sclera anicteric, pupils reactive  ENT: Canals free of cerumen, TM's unremarkable; nasal mucosa/turbinates w/o exudate or edema; o/p w/o exudate or erythema; sinus nontender  NECK: Supple w/o lymphadenopathy or thyromegaly  RESPIRATORY: Efforts unlabored; R lung has crackles present in lower lung; no wheezes present  CARDIOVASCULAR: Heart RRR, no carotid bruits noted, 1+ pedal pulses, no edema  GASTROINTESTINAL: BS+, soft, NT/ND, - HSM noted  MUSCULOSKELETAL: Gait normal, no CCE  NEUROLOGIC:GARBER. No tremor noted    Surgical Risk Assessment   Active cardiac issues:  Active decompensated heart failure? No   Unstable angina?  No   Significant uncontrolled arrhythmias? No   Severe valvular heart disease-Aortic or Mitral Stenosis? No   Recent MI or coronary revascularization < 30 days? No     Cardiac Risk Factors  History of CAD/ischemic heart disease? No   History of cerebrovascular disease?   No   History of congestive heart failure? No   Type 2 diabetes requiring insulin? No   Serum Creatinine > 2? No   Total cardiac risk factors 0     Revised cardiac risk index is 3.9% for 30-day risk of death, MI, or cardiac arrest.       Functional mets :  Has excellent functional mets at this time. Patient has a history of ILD but denies any dyspnea with exertion. Not on oxygen.     < 4* METs -unable to walk > 2 blocks on level ground without stopping due to symptoms  - eating, dressing, toileting, walking indoors, light housework. POOR   > 4* METs -climbing > 1 flight of stairs without stopping  -walking up hill > 1-2 blocks  -scrubbing floors  -moving furniture  - golf, bowling, dancing or tennis  -running short distance MODERATE to EXCELLENT   * performance of any one of the activities     Sandy Magi-Operative Cardiac Risk Score: 0.4% PERICO risk    Recommendation  1. Patient has the above magi-operative risk at this time given the information currently available.   2. Patient does not have any contraindication to anesthesia at this time.   3. Stable for Cataract surgery at this time.     Fredy Borrego MD PGY-3

## 2024-01-26 ENCOUNTER — OFFICE VISIT (OUTPATIENT)
Dept: PAIN MEDICINE | Facility: CLINIC | Age: 74
End: 2024-01-26
Payer: MEDICARE

## 2024-01-26 VITALS
OXYGEN SATURATION: 100 % | HEIGHT: 65 IN | HEART RATE: 83 BPM | TEMPERATURE: 97 F | RESPIRATION RATE: 12 BRPM | BODY MASS INDEX: 25.71 KG/M2 | WEIGHT: 154.31 LBS | SYSTOLIC BLOOD PRESSURE: 139 MMHG | DIASTOLIC BLOOD PRESSURE: 69 MMHG

## 2024-01-26 DIAGNOSIS — G60.9 IDIOPATHIC NEUROPATHY: ICD-10-CM

## 2024-01-26 DIAGNOSIS — M47.812 CERVICAL SPONDYLOSIS: ICD-10-CM

## 2024-01-26 DIAGNOSIS — M34.89 CUTANEOUS SCLERODERMA: ICD-10-CM

## 2024-01-26 DIAGNOSIS — Z79.891 ENCOUNTER FOR MONITORING OPIOID MAINTENANCE THERAPY: ICD-10-CM

## 2024-01-26 DIAGNOSIS — G89.4 CHRONIC PAIN DISORDER: Primary | ICD-10-CM

## 2024-01-26 DIAGNOSIS — Z51.81 ENCOUNTER FOR MONITORING OPIOID MAINTENANCE THERAPY: ICD-10-CM

## 2024-01-26 DIAGNOSIS — M79.18 MYOFASCIAL PAIN: ICD-10-CM

## 2024-01-26 DIAGNOSIS — M50.30 DDD (DEGENERATIVE DISC DISEASE), CERVICAL: ICD-10-CM

## 2024-01-26 PROCEDURE — 99215 OFFICE O/P EST HI 40 MIN: CPT | Mod: PBBFAC | Performed by: NURSE PRACTITIONER

## 2024-01-26 PROCEDURE — 80347 BENZODIAZEPINES 13 OR MORE: CPT | Mod: TXP | Performed by: NURSE PRACTITIONER

## 2024-01-26 PROCEDURE — 80355 GABAPENTIN NON-BLOOD: CPT | Mod: NTX | Performed by: NURSE PRACTITIONER

## 2024-01-26 PROCEDURE — 99999 PR PBB SHADOW E&M-EST. PATIENT-LVL V: CPT | Mod: PBBFAC,,, | Performed by: NURSE PRACTITIONER

## 2024-01-26 PROCEDURE — 80326 AMPHETAMINES 5 OR MORE: CPT | Mod: TXP | Performed by: NURSE PRACTITIONER

## 2024-01-26 PROCEDURE — 99214 OFFICE O/P EST MOD 30 MIN: CPT | Mod: S$PBB,,, | Performed by: NURSE PRACTITIONER

## 2024-01-26 RX ORDER — ACETAMINOPHEN AND CODEINE PHOSPHATE 300; 30 MG/1; MG/1
1 TABLET ORAL DAILY PRN
Qty: 30 TABLET | Refills: 0 | Status: SHIPPED | OUTPATIENT
Start: 2024-01-26 | End: 2024-02-27

## 2024-01-26 RX ORDER — TIZANIDINE 4 MG/1
8 TABLET ORAL NIGHTLY PRN
Qty: 60 TABLET | Refills: 3 | Status: SHIPPED | OUTPATIENT
Start: 2024-01-26 | End: 2024-04-29 | Stop reason: SDUPTHER

## 2024-01-26 RX ORDER — NABUMETONE 750 MG/1
750 TABLET, FILM COATED ORAL DAILY PRN
Qty: 30 TABLET | Refills: 2 | Status: SHIPPED | OUTPATIENT
Start: 2024-01-26 | End: 2024-02-26 | Stop reason: SDUPTHER

## 2024-01-26 NOTE — PROGRESS NOTES
Chronic Pain - Follow Up          Referring Physician: No ref. provider found    Chief Complaint:   Chief Complaint   Patient presents with    Foot Pain        SUBJECTIVE: Disclaimer: This note has been generated using voice-recognition software. There may be typographical errors that have been missed during proof-reading    Interval History 1/26/2024:  The patient returns to clinic today for follow up of foot pain. She continues to report bilateral foot pain. This pain is worse at night. She does sometimes wake up due to pain. She continues to have open wounds, currently on her ankles. She is currently taking Lyrica, Relafen, and Zanaflex. She also takes Tylenol #3 as needed with benefit. She denies any other health changes. Her pain today is 5/10.    Interval History 10/27/2023:  The patient returns to clinic today for follow up of foot pain. She continues to report bilateral foot pain. She describes this pain as burning and stinging in nature. She now has sores under her ankles. She is having trouble laying on her sides at night due to pain. The sores by her toes are healing. She continues to report benefit with home medication regimen. She is taking Lyrica, Zanaflex, and Relafen. She also takes Tylenol #3 with benefit. She is out of this medication. She denies any adverse effects. She denies any other health changes. Her pain today is 5/10.     Interval History 7/28/2023:  The patient returns to clinic today for follow up of neck and foot pain via virtual visit. She continues to report bilateral foot pain. This is worse at night, described as burning in nature. She does report some new ulcers to her feet. She does have a home wound care regimen. She continues to report intermittent neck pain. She is taking Lyrica, Zanaflex, and Relafen with benefit. She also takes Tylenol #3 as needed for severe pain with benefit and without adverse effects. She denies any other health changes.     Interval History  4/28/2023:  The patient returns to clinic today for follow up of neck and foot pain. Since last visit, she has had pneumonia. She also had hiatal hernia repair. She continues to report bilateral foot pain. This is burning in nature. Her pain is worse at night. She reports intermittent neck pain. She continues to take Lyrica, Zanaflex, and Relafen with benefit. She also takes Tylenol #3 for severe pain. She denies any adverse effects. She denies any other health changes. Her pain today is 4/10.    Interval History 12/30/2022:  The patient returns to clinic today for follow up of neck and foot pain. She reports increased foot pain over the last 2 months. She continues to report ulcers to her feet. She reports bilateral foot pain. Her pain is worse at night. She reports intermittent neck pain. She is taking Lyrica, Zanaflex, and Relafen with benefit. She also takes Tylenol #3 with benefit. She denies any adverse effects. She denies any other health changes. Her pain today is 6/10.    Interval History 8/26/2022:  The patient returns to clinic today for follow up neck and foot pain. She reports increased pain over the last few days. She attributes this as she is out of Lyrica. She continues to report bilateral foot pain. She continues to have wounds. She continues to perform wound care. She continues to report intermittent neck pain. She continues to take Lyrica, Zanaflex, and Relafen with benefit. She continues to take Tylenol #3 for severe pain as needed with benefit. She denies any adverse effects. She denies any other health changes. Her pain today is 4/10.    Interval History 5/26/2022:  The patient returns to clinic today for follow up of neck and foot pain via virtual visit. She continues to report bilateral foot pain. She continues to report ulcers to her feet. She continues to report neck pain with intermittent radiating pain into her arms. She continues to report benefit with current medications. She is taking  Lyrica, Zanaflex, and Relafen. She also takes Tylenol #3 with benefit. She denies any adverse effects with these medications. She continues to perform a home exercise routine. She denies any other health changes.     Interval History 2/15/22  Patient presents in follow up. Was previously Dr. King patient with primary complaints of neck and foot pain. She reports her pain has been stable since her last visit. She did have covid in January and stopped all of ehr pain medications. She got an antibody infusion. She has fully recovered. She restarted her pain medications and reports that they are effective. She is taking Tylenol 3 daily, nambumetome 750 mg BID, lyrica 150 mg BID and Zanaflex 4 mg TID. She did do PT for her neck November to January per her report. She continues with mild neck pain without radiation into the arms or hands. Pain is worse with sidebending and rotation to the right and better with rest and medications. She denies any numbness tingling weakness or b/b incontinence.    Interval History 1/4/2022:  The patient returns to clinic today for follow up of foot pain via virtual visit. She continues to report foot pain and wounds. She continues to follow up with podiatry and wound care. She reports increased neck pain. This pain is tight and aching in nature. She denies any radicular arm pain. She is currently taking Relafen, Lyrica, and Tylenol #3 with benefit and without adverse effects. She reports limited benefit with Zanaflex. She denies any other health changes.     Interval History 12/8/2021:  The patient returns to clinic today for foot pain via virtual visit. She continues to report foot pain. She does have foot wounds. She recently saw Dr. Claudio in [facility]. He does not recommend any vascular interventions at this time. She continues to follow up with Podiatry and wound care. She continues to take Zanaflex, Relafen, Lyrica and Tylenol #3 with benefit. She denies any adverse effects. She  denies any other health changes. Her pain today is 7/10.    Interval History 11/8/2021:  The patient returns to clinic today for follow up of foot pain via virtual visit. At last visit, she was starting on Tylenol #3. She does find benefit with this. She sometimes breaks this in half. She continues to take Zanaflex, Relafen, and Lyrica. She continues to report bilateral foot pain and ulcers. She continues to follow up with podiatry. Her pain is worse at night. She denies any other health changes. Her pain today is 7/10.    Interval History 10/20/2021:  The patient returns to clinic today for follow up of foot pain. At last visit, she was started on Tramadol. She did have relief but had significant side effects. She experienced sedation, itching, headaches, and decreased appetite. This has resolved after stopping the medication. She continues to report foot pain. This pain is worse at night. She continues to follow up with podiatry. She continues to take Tizanidine, Relafen, and Lyrica with some benefit. She denies any adverse effects. She denies any other health changes. Her pain today is 8/10.    Interval history 10/06/2021:  Since previous encounter the patient continues to have nonhealing wound has been followed up with wound care and is scheduled to follow with Rheumatology for the wounds in her legs she was referred to podiatry with stated that they have done nothing different me that she with doing on her own.  She continues to have neck pain and bilateral foot pain.  She has been taking tizanidine Relafen Tylenol p.m. and Lyrica 600 mg per day.  She states that all these medications are helpful.    Interval History 6/3/2021:  The patient returns to clinic today for follow up of foot pain. She continues to report left foot pain secondary to ulcer. She is seeing Wound Care. She describes this pain as burning in nature. Her pain is worse with prolonged activity. She reports intermittent neck pain. She continues  to take Zanaflex. She reports limited benefit with increased Lyrica dose. She denies any other health changes. Her pain today is 5/10.    Interval History 5/5/2021:  The patient returns to clinic today for follow up of foot pain. She reports a new ulcer on her left foot. She is seeing Wound Care. She reports increased pain with this new ulcer. She describes this pain as burning. She continues to report neck pain that is tight and aching. She denies any radicular arm pain. Her pain is worse with prolonged activity, especially turning her head to the side. She continues to perform a home exercise routine. She continues to take Lyrica and Zanaflex with benefit. She denies any other health changes. Her pain today is 5/10.    Interval History 2/8/2021:  The patient returns to clinic today for follow up of neck and foot pain. She reports that her neck pain has significantly improved since last visit. She has recently completed physical therapy for this. She is performing a home stretching routine. She reports intermittent neck pain that is tight and aching in nature. She denies any radicular arm pain. She continues to report bilateral foot pain. This is worse at night. She continues to take Lyrica and Zanaflex with benefit. She denies any other health changes. Her pain today is 4/10.    Interval History 1/8/2021:  The patient returns to clinic today for follow up of neck and foot pain. She continues to report neck pain that is tight and aching in nature. She denies any radicular pain. She continues to participate in physical therapy and a home exercise routine. She continues to report bilateral foot pain, worse at night. She reports that increased Lyrica dose has provided improved benefit. She also takes Zanaflex with benefit. She denies any other health changes. Her pain today is 3/10.    Interval History 11/13/2020:  The patient returns to clinic today for follow up of neck and foot pain. She continues to report  bilateral foot pain. Since last visit, she had a venous ablation procedure on her right leg through Vascular. She is scheduled for the left side in the next month. She continues to report bilateral foot pain, worse at night. She continues to report neck pain that is tight and aching in nature. She denies any radicular pain. Her pain is worse flexion and turning her head to the side. She is currently participating in physical therapy with some benefit. She continues to take Lyrica but does ask if this could be increased. She continues to take Zanaflex with benefit. She denies any other health changes. Her pain today is 5/10    Interval History 10/2/2020:  The patient returns to clinic today for follow up of foot pain. She reports increased bilateral foot pain over the last few months. This pain is burning in nature. This pain is worse at night. She continues to have ulcers to her feet related to her scleroderma. She would like to restart the Lyrica. She also reports increased neck pain that is sore and aching in nature. She denies any radiating arm pain. This is worse with turning her head and at night. She denies any other health changes. Her pain today is 7/10.    Interval History 6/5/2020:  The patient requests audio visit today for follow up of bilateral foot pain. She reports intermittent foot pain that is tolerable at this time. She has discontinued Lyrica as of April. She continues to have ulcers to her feet. She does have wound care. She denies any other health changes.     Interval History 1/17/2020:  The patient returns to clinic today for follow up. She continues to report bilateral foot pain. She describes this pain as burning and tingling in nature. At last visit, we decreased her Lyrica dose to 100 mg at night. She reports increased pain since then. She would like to go to back to the 150 mg dose. She continues to have ulcers to her feet, left worse than right. She continues to participate in a home  wound care program. She denies any other health changes. Her pain today is 6/10.    Interval History 12/17/2019:  The patient returns to clinic today for follow up. She reports improving foot pain. She reports improved healing ulcers to her left foot. She continues to report right foot pain that is burning and tingling in nature. She continues to participate in a home wound care routine. She continues to take Lyrica. She asks about decreasing this. She denies any other health changes. Her pain today is 5/10.    Interval History 9/17/2019:  The patient returns to clinic today for follow up. She continues to report bilateral foot pain that is burning and tingling in nature. Her pain is worse with sitting and at night. She continues to have slow healing ulcers to both feet. She continues to perform a home wound care program. She is no longer taking Gabapentin which was previously called in. She is now taking Pregabalin generic with benefit. She denies any other health changes. Her pain today is 4/10.    Interval History 6/13/2019:  The patient returns to clinic for follow up for bilateral foot pain. She continues to report bilateral foot pain that is burning in nature. She continues to have slow healing wounds to both feet from ulcers. She continues to take Lyrica once daily but reports increased pain. She continues to take Vitamin B12. She denies any other health changes. Her pain today is 8/10.    Interval History 3/13/2019:  The patient returns to clinic today for follow up. She continues to report bilateral foot pain that is burning and tingling in nature. Her pain is worse with prolonged walking and standing. She continues to have bilateral foot wounds. She reports that these wounds have significantly improved since last visit. She is currently taking Vitamin B12 with benefit. She continues to report benefit with Lyrica. She is currently taking this once daily. She denies any other health changes. Her pain today  is 5/10.    Interval History 2/6/2019:  The patient returns to clinic today for follow up. She reports that her bilateral foot wounds have significantly improved since last visit. She does report some significant improvement in her foot pain. She reports intermittent bilateral foot pain especially with prolonged walking. She describes this pain as heavy and tingling in nature. She is no longer taking Celebrex. She is currently taking Lyrica 150 mg BID with benefit. She does report that the Lyrica is expensive for her. She denies any other health changes. Her pain today is 5/10.    Interval History 12/5/18:  Patient returns to clinic today for follow up. She reports that since increasing her medications, she is having significant improvement in pain during the day time. She is currently taking Lyrica 75mg TID and Celebrex 200mg TID. However, she states that the burning  And tingling pain in both her feet increase in the evening around 6 or 7pm. She is unable to sleep during the nights due to the pain. She is still wearing her bilateral boots due to non healing ulcers from her scleroderma.     Interval History 8/30/2018:  The patient returns to clinic today for follow up. She continues to report bilateral foot pain that is burning and tingling in nature. She reports that this pain is worse at night. She is currently taking Lyrica 75 mg BID with limited relief. She did not have any benefit with Mobic. She continues to take Aleve with some benefit. She continues to have nonhealing ulcers. She wears an ACE bandage to her right calf, as well as boots to her bilateral feet. She denies any other health changes. Her pain today is 7/10.     Interval History 7/11/2018:  Since previous encounter she has weaned from gabapentin to 600mg / day and did not notice significant worsening of pain, but was having somnelence from higher doses of the medication in the past.  We are weaning in an attempt to trial Lyrica as an alternative  to gabapentin.  Tramadol causes significant sedation, limiting its use.  She has discontinued the use of the tramadol.  Additionally she does have benefit from anti-inflammatory medication, has been using aleve, has not trialed CANTRELL-2 inhibitors in the past.    Interval history 05/16/2018:      The patient has had x-rays of the lumbar spine which did not reveal any evidence of significant neuroforaminal stenosis with degenerative disc disease.  There is mild facet arthropathy.  She has escalated her gabapentin and is currently taking 2100 mg of gabapentin per day without any noticeable improvement but daytime somnolence.  She continues to have nonhealing ulcers and topical pain cream is helping to a limited degree over her leg in areas where there is no skin disruption.    Initial encounter:    Yamel Shah presents to the clinic for the evaluation of foot pain. The pain started 2 years ago following ulcers and symptoms have been worsening.    Brief history: History of scleroderma    Pain Description:    The pain is located in the both feet in the area of slowly healing chronic foot ulcers which were partly from venous insufficiency and stasis associated with her scleroderma.  In her right lower extremity she describes radicular symptoms in the L4/5 distribution.    At BEST  5/10     At WORST  10/10 on the WORST day.      On average pain is rated as 8/10.     Today the pain is rated as 8/10    The pain is described as burning, sharp, shooting and constant       Symptoms interfere with daily activity, sleeping and work.     Exacerbating factors: Laying, Walking, Night Time, Morning and Getting out of bed/chair.      Mitigating factors medications.     Patient denies night fever/night sweats, urinary incontinence, bowel incontinence, significant weight loss, significant motor weakness and loss of sensations.  Patient denies any suicidal or homicidal ideations    Pain Medications:  Current:  Lyrica 300 mg  daily  Zanaflex  Relafen  Tylenol #3    Tried in Past:  NSAIDs -Aleve, Mobic, Celebrex  TCA -Never  SNRI -Never  Anti-convulsants - Gabapentin, Lyrica  Muscle Relaxants -Never  Opioids-Tramadol    Physical Therapy/Home Exercise: no       report:  Reviewed and consistent with medication use as prescribed.    Pain Procedures: none    Chiropractor -never  Acupuncture - never  TENS unit -never  Spinal decompression -never  Joint replacement -never    Imaging:   Xray Cervical Spine 12/6/2019:  FINDINGS:  Odontoid prevertebral soft tissues and posterior elements are intact.  Neural foramina are patent.  No fracture dislocation bone destruction seen.  There is mild DJD.     Impression:     Mild DJD.    X-ray lumbar spine 6/1/2016:  2 views: Alignment is normal. There is mild DJD. No fracture dislocation bone destruction seen.   Impression      Mild DJD.     Xray lumbar spine 4/2018:  COMPARISON:  June 2016    FINDINGS:  There is slight curvature of the lumbar spine.  The vertebral bodies are normally aligned and normal in height.  Mild disc space narrowing present at L5-S1.  There is mild facet degenerative change in the lower spine.  No significant osteophytic spurring present.  There is no change in alignment with flexion or extension.      Past Medical History:   Diagnosis Date    Abnormal Pap smear     Acid reflux     Allergy     Arthritis     Encounter for blood transfusion     GERD (gastroesophageal reflux disease)     Hiatal hernia 03/24/2023    History of migraine headaches     Hx of colonic polyp     Hypertension     Idiopathic neuropathy 07/20/2012    ILD (interstitial lung disease) 11/06/2013    Iron deficiency anemia 03/18/2014    MRSA carrier     Osteopenia     Pneumonia     Pulmonary fibrosis     Pulmonary hypertension     Raynaud's disease     Scleroderma, diffuse     Sjogren's syndrome     Vitamin D deficiency 11/14/2013     Past Surgical History:   Procedure Laterality Date    24 HOUR IMPEDANCE PH  MONITORING OF ESOPHAGUS IN PATIENT NOT TAKING ACID REDUCING MEDICATIONS N/A 03/04/2020    Procedure: IMPEDANCE PH STUDY, ESOPHAGEAL, 24 HOUR, IN PATIENT NOT TAKING ACID REDUCING MEDICATION;  Surgeon: Annamaria Mendoza MD;  Location: [facility] (4TH FLR);  Service: Endoscopy;  Laterality: N/A;  OFF PPI/H2 Blocker   Motility Studies   Hold Narcotics x 1 days   Hold TCA x 1 days  2/26 - LVM attempting to confirm appt  2/27 - Confirmed appt    ANORECTAL MANOMETRY N/A 05/10/2021    Procedure: MANOMETRY, ANORECTAL with balloon expulsion test;  Surgeon: Annamaria Mendoza MD;  Location: [facility] ENDO (4TH FLR);  Service: Endoscopy;  Laterality: N/A;  order combined  covid test 5/7 [facility], instructions emailed-hospitals    BREAST BIOPSY      Left, benign    BRONCHOSCOPY N/A 10/2/2023    Procedure: bronch;  Surgeon: Roberta Leigh DO;  Location: [facility] OR 2ND FLR;  Service: Endoscopy;  Laterality: N/A;    CERVICAL CONIZATION   W/ LASER  1970    COLONOSCOPY      COLONOSCOPY N/A 03/29/2019    Procedure: COLONOSCOPY;  Surgeon: Annamaria Mendoza MD;  Location: [facility] (2ND FLR);  Service: Endoscopy;  Laterality: N/A;    COLONOSCOPY N/A 05/10/2021    Procedure: COLONOSCOPY;  Surgeon: Annamaria Mendoza MD;  Location: [facility] ENDO (4TH FLR);  Service: Endoscopy;  Laterality: N/A;  2nd floor-previous scopes done on 2nd floor, gastroparesis  full liquid diet x2 days, clear liquid x1 day prior to procedure  covid test 5/7 [facility], instructions emailed-hospitals  5/6 pt confirmed [id number]    DILATION AND CURETTAGE OF UTERUS      ESOPHAGEAL MANOMETRY WITH MEASUREMENT OF IMPEDANCE N/A 03/04/2020    Procedure: MANOMETRY, ESOPHAGUS, WITH IMPEDANCE MEASUREMENT;  Surgeon: Annamaria Mendoza MD;  Location: [facility] ENDO (4TH FLR);  Service: Endoscopy;  Laterality: N/A;  OFF PPI/H2 Blocker   Motility Studies   Hold Narcotics x 1 days   Hold TCA x 1 days    ESOPHAGOGASTRODUODENOSCOPY      ESOPHAGOGASTRODUODENOSCOPY N/A 03/29/2019    Procedure: EGD  (ESOPHAGOGASTRODUODENOSCOPY);  Surgeon: Annamaria Mendoza MD;  Location: [facility] ENDO (2ND FLR);  Service: Endoscopy;  Laterality: N/A;  pulmonary htn    ESOPHAGOGASTRODUODENOSCOPY N/A 02/02/2021    Procedure: ESOPHAGOGASTRODUODENOSCOPY (EGD);  Surgeon: Annamaria Mendoza MD;  Location: [facility] ENDO (2ND FLR);  Service: Endoscopy;  Laterality: N/A;  2nd floor gastroparesis  3 days full liquid diet and 1 day clears  covid test 1/30 primary care, instructions sent to [facility]    ESOPHAGOGASTRODUODENOSCOPY N/A 07/01/2022    Procedure: ESOPHAGOGASTRODUODENOSCOPY (EGD);  Surgeon: Annamaria Mendoza MD;  Location: [facility] ENDO (2ND FLR);  Service: Endoscopy;  Laterality: N/A;  2nd floor-gastroparesis  full liquid diet x3 days, clear liquid diet x1 day prior to procedure  fully vaccinated, instructions sent to [email or web address]  6/29-pt confirmed new arrival time-[facility]    EXCISION, LESION, STOMACH, LAPAROSCOPIC N/A 03/23/2023    Procedure: XI ROBOTIC EXCISION, LESION, STOMACH;  Surgeon: Katherine Segura MD;  [facility];  Service: General;  Laterality: N/A;    EXCISIONAL BIOPSY, LYMPH NODE Right 05/26/2023    Procedure: EXCISIONAL BIOPSY, LYMPH NODE, INGUINAL;  Surgeon: Katherine Segura MD;  Location: [facility] OR [address] [place] [address];  Service: General;  Laterality: Right;    HYSTERECTOMY  1990    FRANDY (AUB, Fibroids), ovaries remain    REPAIR, HERNIA, UMBILICAL N/A 03/23/2023    Procedure: REPAIR, HERNIA, UMBILICAL;  Surgeon: Katherine Segura MD;  Location: [facility] OR [address] [place] [address];  Service: General;  Laterality: N/A;    RIGHT HEART CATHETERIZATION Right 01/05/2021    Procedure: INSERTION, CATHETER, RIGHT HEART;  Surgeon: Aureliano Sy MD;  Location: [facility] CATH LAB;  Service: Cardiology;  Laterality: Right;    RIGHT HEART CATHETERIZATION Right 03/16/2023    Procedure: INSERTION, CATHETER, RIGHT HEART;  Surgeon: Aureliano Sy MD;  Location: [facility] CATH LAB;  Service: Cardiology;  Laterality: Right;     ROBOT-ASSISTED LAPAROSCOPIC REPAIR OF INGUINAL HERNIA USING DA VERNELL XI Right 05/26/2023    Procedure: XI ROBOTIC REPAIR, HERNIA, INGUINAL, FEMORAL;  Surgeon: Katherine Segura MD;  Location: [facility] OR 2ND FLR;  Service: General;  Laterality: Right;    ROBOT-ASSISTED REPAIR OF HIATAL HERNIA USING DA VERNELL XI N/A 03/23/2023    Procedure: XI ROBOTIC REPAIR, HERNIA, HIATAL;  Surgeon: Katherine Segura MD;  Location: [facility] OR 2ND FLR;  Service: General;  Laterality: N/A;    VARICOSE VEIN SURGERY      XI ROBOTIC GASTROPEXY N/A 03/23/2023    Procedure: XI ROBOTIC GASTROPEXY;  Surgeon: Katherine Segura MD;  Location: [facility] OR 2ND FLR;  Service: General;  Laterality: N/A;    XI ROBOTIC PYLOROMYOTOMY N/A 03/23/2023    Procedure: XI ROBOTIC PYLOROMYOTOMY;  Surgeon: Katherine Segura MD;  Location: [facility] OR 2ND FLR;  Service: General;  Laterality: N/A;     Social History     Socioeconomic History    Marital status:      Spouse name: Lewis    Number of children: 4   Occupational History    Occupation: -retired     Employer: [employer]     Comment: [place]     Employer: RETIRED   Tobacco Use    Smoking status: Never     Passive exposure: Never    Smokeless tobacco: Never   Substance and Sexual Activity    Alcohol use: Never     Comment: wine occasionally    Drug use: No    Sexual activity: Yes     Partners: Male     Birth control/protection: Post-menopausal, None   Other Topics Concern    Are you pregnant or think you may be? No    Breast-feeding No   Social History Narrative         Social Determinants of Health     Financial Resource Strain: Low Risk  (12/18/2023)    Overall Financial Resource Strain (CARDIA)     Difficulty of Paying Living Expenses: Not hard at all   Food Insecurity: No Food Insecurity (12/18/2023)    Hunger Vital Sign     Worried About Running Out of Food in the Last Year: Never true     Ran Out of Food in the Last Year: Never true    Transportation Needs: No Transportation Needs (12/18/2023)    PRAPARE - Transportation     Lack of Transportation (Medical): No     Lack of Transportation (Non-Medical): No   Physical Activity: Insufficiently Active (12/18/2023)    Exercise Vital Sign     Days of Exercise per Week: 2 days     Minutes of Exercise per Session: 10 min   Stress: No Stress Concern Present (12/18/2023)    [place] [employer] - Occupational Stress Questionnaire     Feeling of Stress : Only a little   Social Connections: Unknown (12/18/2023)    Social Connection and Isolation Panel [NHANES]     Frequency of Communication with Friends and Family: Twice a week     Frequency of Social Gatherings with Friends and Family: Patient declined     Active Member of Clubs or Organizations: No     Attends Club or Organization Meetings: Never     Marital Status:    Housing Stability: Low Risk  (12/18/2023)    Housing Stability Vital Sign     Unable to Pay for Housing in the Last Year: No     Number of Places Lived in the Last Year: 1     Unstable Housing in the Last Year: No     Family History   Problem Relation Age of Onset    Breast cancer Mother     Cancer Mother         Breast    Hypertension Father     Diabetes Father         Amputation of legs    Breast cancer Sister     Diabetes Sister     Arthritis Sister     Cancer Sister         Breast    Osteoarthritis Brother     Diabetes Brother     Arthritis Brother     Birth defects Brother         Pol[place] at birth, recently had knee replacement surgery on left knee    No Known Problems Daughter     No Known Problems Daughter     No Known Problems Son     No Known Problems Son     Breast cancer Maternal Aunt     Cancer Maternal Aunt         Breast    Early death Sister     Melanoma Neg Hx     Colon cancer Neg Hx     Crohn's disease Neg Hx     Stomach cancer Neg Hx     Ulcerative colitis Neg Hx     Rectal cancer Neg Hx     Irritable bowel syndrome Neg Hx     Esophageal cancer Neg  Hx     Celiac disease Neg Hx     Ovarian cancer Neg Hx     Liver cancer Neg Hx     Pancreatic cancer Neg Hx        Review of patient's allergies indicates:   Allergen Reactions    Sulfa (sulfonamide antibiotics)      Other reaction(s): Rash       Current Outpatient Medications   Medication Sig    albuterol (PROVENTIL) 2.5 mg /3 mL (0.083 %) nebulizer solution Take 3 mLs (2.5 mg total) by nebulization every 4 (four) hours as needed for Wheezing or Shortness of Breath (or cough). Rescue    albuterol (VENTOLIN HFA) 90 mcg/actuation inhaler Inhale 2 puffs into the lungs every 6 (six) hours as needed for Wheezing. Rescue    albuterol sulfate 2.5 mg/0.5 mL Nebu Take 2.5 mg by nebulization every 4 (four) hours as needed (shortness of breath/wheezing). Rescue    carboxymethylcellulose sodium (REFRESH TEARS) 0.5 % Drop Apply 1-2 drops to every as needed    ergocalciferol (ERGOCALCIFEROL) 50,000 unit Cap TAKE 1 CAPSULE BY MOUTH EVERY 7 DAYS    furosemide (LASIX) 20 MG tablet Take 0.5 tablets (10 mg total) by mouth once daily.    multivitamin capsule Take 1 capsule by mouth once daily.    nabumetone (RELAFEN) 750 MG tablet Take 750 mg by mouth.    nebulizer and compressor Rosemary Use as directed    NIFEdipine (ADALAT CC) 90 MG TbSR Take 1 tablet (90 mg total) by mouth nightly. TAKE 1 TABLET(90 MG) BY MOUTH EVERY DAY (TAKES NIGHTLY WITH 30 MG TABLET TOTAL 120 MG)    NIFEdipine (PROCARDIA-XL) 30 MG (OSM) 24 hr tablet TAKE 1 TABLET(30 MG) BY MOUTH EVERY DAY    pravastatin (PRAVACHOL) 20 MG tablet Take 1 tablet (20 mg total) by mouth every evening.    pregabalin (LYRICA) 300 MG Cap Take 1 capsule (300 mg total) by mouth 2 (two) times daily.    PREVIDENT 5000 BOOSTER PLUS 1.1 % Pste SMARTSIG:To Teeth    PREVIDENT 5000 SENSITIVE 1.1-5 % Pste BRUSH FOR 2 MINUTES TWICE PER DAY    RABEprazole (ACIPHEX) 20 mg tablet Take 1 tablet (20 mg total) by mouth 2 (two) times daily.    tadalafil (ADCIRCA) 20 mg Tab Take 2 tablets (40 mg total) by  "mouth once daily. (Patient taking differently: Take 40 mg by mouth every evening.)    tiZANidine (ZANAFLEX) 4 MG tablet Take 1 tablet (4 mg total) by mouth nightly as needed (muscle pain).    albuterol (VENTOLIN HFA) 90 mcg/actuation inhaler Inhale 2 puffs into the lungs every 4 (four) hours as needed for Wheezing. Rescue (Patient not taking: Reported on 1/25/2024)    mycophenolate mofetil (CELLCEPT) 200 mg/mL SusR Take 5 mLs (1,000 mg total) by mouth 2 (two) times daily.     No current facility-administered medications for this visit.     Facility-Administered Medications Ordered in Other Visits   Medication    fentaNYL 50 mcg/mL injection 25 mcg    haloperidol lactate injection 0.5 mg    sodium chloride 0.9% flush 10 mL       REVIEW OF SYSTEMS:    GENERAL:  No weight loss, malaise or fevers.  HEENT:   No recent changes in vision or hearing  NECK:  Negative for lumps,  difficulty with swallowing associated with scleroderma.  RESPIRATORY:  Negative for cough, wheezing or shortness of breath, patient denies any recent URI. Albuterol (history of ILD)  CARDIOVASCULAR:  Negative for chest pain, leg swelling or palpitations.  GI:  Negative for abdominal discomfort, blood in stools or black stools or change in bowel habits. GERD controlled zantac  MUSCULOSKELETAL:  See HPI.  SKIN:   Chronic low healing venous stasis ulcerations to bilateral lower extremities   PSYCH:  No mood disorder or recent psychosocial stressors.  Patients sleep is not disturbed secondary to pain.  HEMATOLOGY/LYMPHOLOGY:   History of iron deficiency anemia, takes daily iron supplementation.    ENDO: No history of diabetes or thyroid dysfunction  NEURO:   No history of headaches, syncope, paralysis, seizures or tremors.  All other reviewed and negative other than HPI.    OBJECTIVE:      /69 (BP Location: Right arm, Patient Position: Sitting, BP Method: Small (Automatic))   Pulse 83   Temp 97 °F (36.1 °C) (Oral)   Resp 12   Ht 5' 5" (1.651 m) "   Wt 70 kg (154 lb 5.2 oz)   SpO2 100%   BMI 25.68 kg/m²     PHYSICAL EXAMINATION:    GENERAL: Well appearing, in no acute distress, alert and oriented x3.  PSYCH:  Mood and affect appropriate.  SKIN: Tight skin associated with scleroderma. Wearing shoes today.   HEAD/FACE:  Normocephalic, atraumatic. Cranial nerves grossly intact.  CV: RRR with palpation of the radial artery.  PULM: No evidence of respiratory difficulty, symmetric chest rise.  EXTREMITIES: Contractures over on bilateral hands with ulcerations to knuckles, right greater than left.   MUSCULOSKELETAL:   Bilateral lower extremity strength testing limited secondary to pain in the feet.    NEURO:  Decreased sensation to BLE.   GAIT: Antalgic, ambulates without assistance       ASSESSMENT: 73 y.o. year old female with pain, consistent with the following:    Encounter Diagnoses   Name Primary?    Chronic pain disorder Yes    Cutaneous scleroderma     Idiopathic neuropathy     Myofascial pain     Cervical spondylosis     DDD (degenerative disc disease), cervical     Encounter for monitoring opioid maintenance therapy          PLAN:     - Previous imaging reviewed today. Labs reviewed.      - Continue Lyrica 300 mg BID.     - Continue Relafen.     - Increase Zanaflex to 8 mg at bedtime.     - Pain Contract signed 8/26/2022.     - Continue Tylenol #3 once daily PRN. Refill provided.    - The patient is here today for a refill of current pain medications and they believe these provide effective pain control and improvements in quality of life.  The patient notes no serious side effects, and feels the benefits outweigh the risks.  The patient was reminded of the pain contract that they signed previously as well as the risks and benefits of the medication including possible death.  The updated [place] [employer] prescription monitoring program was reviewed, and the patient has been found to be compliant with current treatment plan. Medication  management provided by Dr. Hinson.     - Repeat UDS today.     - Continue home exercise routine.     - RTC in 3 months or sooner if needed.  Next visit with Dr. Hinson for annual med visit.     The above plan and management options were discussed at length with patient. Patient is in agreement with the above and verbalized understanding.     Viktoriya Camara NP  01/26/2024

## 2024-02-01 LAB
6MAM UR QL: NOT DETECTED
7AMINOCLONAZEPAM UR QL: NOT DETECTED
A-OH ALPRAZ UR QL: NOT DETECTED
ALPHA-OH-MIDAZOLAM: NOT DETECTED
ALPRAZ UR QL: NOT DETECTED
AMPHET UR QL SCN: NOT DETECTED
ANNOTATION COMMENT IMP: NORMAL
BARBITURATES UR QL: NEGATIVE
BUPRENORPHINE UR QL: NOT DETECTED
BZE UR QL: NEGATIVE
CARBOXYTHC UR QL: NEGATIVE
CARISOPRODOL UR QL: NEGATIVE
CLONAZEPAM UR QL: NOT DETECTED
CODEINE UR QL: PRESENT
CREAT UR-MCNC: 112 MG/DL (ref 20–400)
DIAZEPAM UR QL: NOT DETECTED
ETHYL GLUCURONIDE UR QL: NEGATIVE
FENTANYL UR QL: NOT DETECTED
GABAPENTIN: NOT DETECTED
HYDROCODONE UR QL: NOT DETECTED
HYDROMORPHONE UR QL: NOT DETECTED
LORAZEPAM UR QL: NOT DETECTED
MDA UR QL: NOT DETECTED
MDEA UR QL: NOT DETECTED
MDMA UR QL: NOT DETECTED
ME-PHENIDATE UR QL: NOT DETECTED
METHADONE UR QL: NEGATIVE
METHAMPHET UR QL: NOT DETECTED
MIDAZOLAM UR QL SCN: NOT DETECTED
MORPHINE UR QL: PRESENT
NALOXONE: NOT DETECTED
NORBUPRENORPHINE UR QL CFM: NOT DETECTED
NORDIAZEPAM UR QL: NOT DETECTED
NORFENTANYL UR QL: NOT DETECTED
NORHYDROCODONE UR QL CFM: NOT DETECTED
NORMEPERIDINE UR QL CFM: NOT DETECTED
NOROXYCODONE UR QL CFM: NOT DETECTED
NOROXYMORPHONE UR QL SCN: NOT DETECTED
OXAZEPAM UR QL: NOT DETECTED
OXYCODONE UR QL: NOT DETECTED
OXYMORPHONE UR QL: NOT DETECTED
PATHOLOGY STUDY: NORMAL
PCP UR QL: NEGATIVE
PHENTERMINE UR QL: NOT DETECTED
PREGABALIN: PRESENT
SERVICE CMNT-IMP: NORMAL
TAPENTADOL UR QL SCN: NOT DETECTED
TAPENTADOL UR QL SCN: NOT DETECTED
TEMAZEPAM UR QL: NOT DETECTED
TRAMADOL UR QL: NEGATIVE
ZOLPIDEM METABOLITE: NOT DETECTED
ZOLPIDEM UR QL: NOT DETECTED

## 2024-02-02 ENCOUNTER — PATIENT MESSAGE (OUTPATIENT)
Dept: RHEUMATOLOGY | Facility: CLINIC | Age: 74
End: 2024-02-02
Payer: MEDICARE

## 2024-02-02 ENCOUNTER — TELEPHONE (OUTPATIENT)
Dept: TRANSPLANT | Facility: CLINIC | Age: 74
End: 2024-02-02
Payer: MEDICARE

## 2024-02-02 DIAGNOSIS — M34.9 SCLERODERMA: ICD-10-CM

## 2024-02-02 RX ORDER — MYCOPHENOLATE MOFETIL 200 MG/ML
1000 POWDER, FOR SUSPENSION ORAL 2 TIMES DAILY
Qty: 480 ML | Refills: 1 | Status: SHIPPED | OUTPATIENT
Start: 2024-02-02 | End: 2024-02-02 | Stop reason: SDUPTHER

## 2024-02-02 RX ORDER — MYCOPHENOLATE MOFETIL 200 MG/ML
1000 POWDER, FOR SUSPENSION ORAL 2 TIMES DAILY
Qty: 480 ML | Refills: 1 | Status: ACTIVE | OUTPATIENT
Start: 2024-02-02 | End: 2024-06-11 | Stop reason: SDUPTHER

## 2024-02-02 NOTE — TELEPHONE ENCOUNTER
Spoke with Ms. Shah - she is going to call Dr. Rand's office to reschedule labs.    ----- Message from Brittany Pelaez sent at 2/2/2024 12:26 PM CST -----  Regarding: reschedule Labs  Contact: Yamel    Caller: Yamel      Contact: [phone] (home)           Pt calling to reschedule  lab appointment on 03/19/2024 @ 7:15 am , will be out of town from 03/16-24/2024 ,  before seeing dr Rand on 03/26/2024 . Please advise. Requesting a call back.

## 2024-02-16 ENCOUNTER — PATIENT MESSAGE (OUTPATIENT)
Dept: ADMINISTRATIVE | Facility: OTHER | Age: 74
End: 2024-02-16
Payer: MEDICARE

## 2024-02-19 ENCOUNTER — PATIENT MESSAGE (OUTPATIENT)
Dept: ADMINISTRATIVE | Facility: HOSPITAL | Age: 74
End: 2024-02-19
Payer: MEDICARE

## 2024-02-26 DIAGNOSIS — G60.9 IDIOPATHIC NEUROPATHY: ICD-10-CM

## 2024-02-26 DIAGNOSIS — E55.9 VITAMIN D DEFICIENCY: ICD-10-CM

## 2024-02-26 DIAGNOSIS — M34.89 CUTANEOUS SCLERODERMA: ICD-10-CM

## 2024-02-26 DIAGNOSIS — M79.18 MYOFASCIAL PAIN: ICD-10-CM

## 2024-02-26 RX ORDER — NABUMETONE 750 MG/1
750 TABLET, FILM COATED ORAL DAILY PRN
Qty: 30 TABLET | Refills: 2 | Status: SHIPPED | OUTPATIENT
Start: 2024-02-26 | End: 2024-04-29 | Stop reason: SDUPTHER

## 2024-02-26 RX ORDER — ERGOCALCIFEROL 1.25 MG/1
50000 CAPSULE ORAL
Qty: 12 CAPSULE | Refills: 1 | Status: SHIPPED | OUTPATIENT
Start: 2024-02-26

## 2024-02-26 NOTE — TELEPHONE ENCOUNTER
Last filled 12 x1 refill 8/30/23    Last vitamin d lab was 10/19/23 normal at 38.  Lov 10/26/23    Refill?  Take otc?     Thanks sukhdev

## 2024-02-26 NOTE — TELEPHONE ENCOUNTER
No care due was identified.  [facility] Embedded Care Due Messages. Reference number: [id number].   2/26/2024 3:36:00 PM CST

## 2024-02-27 ENCOUNTER — LAB VISIT (OUTPATIENT)
Dept: LAB | Facility: HOSPITAL | Age: 74
End: 2024-02-27
Payer: MEDICARE

## 2024-02-27 ENCOUNTER — OFFICE VISIT (OUTPATIENT)
Dept: OBSTETRICS AND GYNECOLOGY | Facility: CLINIC | Age: 74
End: 2024-02-27
Payer: MEDICARE

## 2024-02-27 ENCOUNTER — OFFICE VISIT (OUTPATIENT)
Dept: TRANSPLANT | Facility: CLINIC | Age: 74
End: 2024-02-27
Payer: MEDICARE

## 2024-02-27 VITALS
BODY MASS INDEX: 25.38 KG/M2 | DIASTOLIC BLOOD PRESSURE: 68 MMHG | SYSTOLIC BLOOD PRESSURE: 150 MMHG | HEIGHT: 65 IN | WEIGHT: 152.31 LBS

## 2024-02-27 VITALS
SYSTOLIC BLOOD PRESSURE: 141 MMHG | DIASTOLIC BLOOD PRESSURE: 65 MMHG | HEIGHT: 65 IN | OXYGEN SATURATION: 100 % | BODY MASS INDEX: 25.49 KG/M2 | WEIGHT: 153 LBS | HEART RATE: 62 BPM

## 2024-02-27 DIAGNOSIS — L97.519 SKIN ULCERS OF BOTH FEET: ICD-10-CM

## 2024-02-27 DIAGNOSIS — I10 ESSENTIAL HYPERTENSION: ICD-10-CM

## 2024-02-27 DIAGNOSIS — J84.9 ILD (INTERSTITIAL LUNG DISEASE): ICD-10-CM

## 2024-02-27 DIAGNOSIS — D84.821 IMMUNOSUPPRESSION DUE TO DRUG THERAPY: ICD-10-CM

## 2024-02-27 DIAGNOSIS — R06.02 SHORTNESS OF BREATH: ICD-10-CM

## 2024-02-27 DIAGNOSIS — I27.20 PULMONARY HYPERTENSION: ICD-10-CM

## 2024-02-27 DIAGNOSIS — L97.529 SKIN ULCERS OF BOTH FEET: ICD-10-CM

## 2024-02-27 DIAGNOSIS — Z01.419 WELL WOMAN EXAM WITH ROUTINE GYNECOLOGICAL EXAM: ICD-10-CM

## 2024-02-27 DIAGNOSIS — I73.00 RAYNAUD'S DISEASE WITHOUT GANGRENE: ICD-10-CM

## 2024-02-27 DIAGNOSIS — I27.21 WHO GROUP 1 PULMONARY ARTERIAL HYPERTENSION: Primary | ICD-10-CM

## 2024-02-27 DIAGNOSIS — M34.9 SCLERODERMA WITH PULMONARY INVOLVEMENT: ICD-10-CM

## 2024-02-27 DIAGNOSIS — N64.4 BREAST PAIN: Primary | ICD-10-CM

## 2024-02-27 DIAGNOSIS — Z79.899 IMMUNOSUPPRESSION DUE TO DRUG THERAPY: ICD-10-CM

## 2024-02-27 DIAGNOSIS — I73.9 PVD (PERIPHERAL VASCULAR DISEASE): ICD-10-CM

## 2024-02-27 LAB
ALBUMIN SERPL BCP-MCNC: 3.4 G/DL (ref 3.5–5.2)
ALP SERPL-CCNC: 109 U/L (ref 55–135)
ALT SERPL W/O P-5'-P-CCNC: 9 U/L (ref 10–44)
ANION GAP SERPL CALC-SCNC: 10 MMOL/L (ref 8–16)
AST SERPL-CCNC: 14 U/L (ref 10–40)
BASOPHILS # BLD AUTO: 0.05 K/UL (ref 0–0.2)
BASOPHILS NFR BLD: 0.7 % (ref 0–1.9)
BILIRUB SERPL-MCNC: 0.3 MG/DL (ref 0.1–1)
BNP SERPL-MCNC: 53 PG/ML (ref 0–99)
BUN SERPL-MCNC: 17 MG/DL (ref 8–23)
CALCIUM SERPL-MCNC: 9.3 MG/DL (ref 8.7–10.5)
CHLORIDE SERPL-SCNC: 108 MMOL/L (ref 95–110)
CO2 SERPL-SCNC: 25 MMOL/L (ref 23–29)
CREAT SERPL-MCNC: 0.7 MG/DL (ref 0.5–1.4)
DIFFERENTIAL METHOD BLD: ABNORMAL
EOSINOPHIL # BLD AUTO: 0 K/UL (ref 0–0.5)
EOSINOPHIL NFR BLD: 0.3 % (ref 0–8)
ERYTHROCYTE [DISTWIDTH] IN BLOOD BY AUTOMATED COUNT: 16.8 % (ref 11.5–14.5)
EST. GFR  (NO RACE VARIABLE): >60 ML/MIN/1.73 M^2
GLUCOSE SERPL-MCNC: 96 MG/DL (ref 70–110)
HCT VFR BLD AUTO: 36.9 % (ref 37–48.5)
HGB BLD-MCNC: 11.5 G/DL (ref 12–16)
IMM GRANULOCYTES # BLD AUTO: 0.01 K/UL (ref 0–0.04)
IMM GRANULOCYTES NFR BLD AUTO: 0.1 % (ref 0–0.5)
LYMPHOCYTES # BLD AUTO: 1 K/UL (ref 1–4.8)
LYMPHOCYTES NFR BLD: 14.4 % (ref 18–48)
MAGNESIUM SERPL-MCNC: 2 MG/DL (ref 1.6–2.6)
MCH RBC QN AUTO: 26.8 PG (ref 27–31)
MCHC RBC AUTO-ENTMCNC: 31.2 G/DL (ref 32–36)
MCV RBC AUTO: 86 FL (ref 82–98)
MONOCYTES # BLD AUTO: 0.4 K/UL (ref 0.3–1)
MONOCYTES NFR BLD: 6 % (ref 4–15)
NEUTROPHILS # BLD AUTO: 5.6 K/UL (ref 1.8–7.7)
NEUTROPHILS NFR BLD: 78.5 % (ref 38–73)
NRBC BLD-RTO: 0 /100 WBC
PLATELET # BLD AUTO: 238 K/UL (ref 150–450)
PMV BLD AUTO: 11.4 FL (ref 9.2–12.9)
POTASSIUM SERPL-SCNC: 4.2 MMOL/L (ref 3.5–5.1)
PROT SERPL-MCNC: 8.5 G/DL (ref 6–8.4)
RBC # BLD AUTO: 4.29 M/UL (ref 4–5.4)
SODIUM SERPL-SCNC: 143 MMOL/L (ref 136–145)
WBC # BLD AUTO: 7.13 K/UL (ref 3.9–12.7)

## 2024-02-27 PROCEDURE — 83880 ASSAY OF NATRIURETIC PEPTIDE: CPT | Mod: NTX

## 2024-02-27 PROCEDURE — 83735 ASSAY OF MAGNESIUM: CPT | Mod: NTX

## 2024-02-27 PROCEDURE — G0101 CA SCREEN;PELVIC/BREAST EXAM: HCPCS | Mod: PBBFAC | Performed by: STUDENT IN AN ORGANIZED HEALTH CARE EDUCATION/TRAINING PROGRAM

## 2024-02-27 PROCEDURE — 99214 OFFICE O/P EST MOD 30 MIN: CPT | Mod: S$PBB,NTX,,

## 2024-02-27 PROCEDURE — 99999 PR PBB SHADOW E&M-EST. PATIENT-LVL III: CPT | Mod: PBBFAC,,, | Performed by: STUDENT IN AN ORGANIZED HEALTH CARE EDUCATION/TRAINING PROGRAM

## 2024-02-27 PROCEDURE — 99999 PR PBB SHADOW E&M-EST. PATIENT-LVL V: CPT | Mod: PBBFAC,TXP,,

## 2024-02-27 PROCEDURE — 36415 COLL VENOUS BLD VENIPUNCTURE: CPT | Mod: TXP

## 2024-02-27 PROCEDURE — 85025 COMPLETE CBC W/AUTO DIFF WBC: CPT | Mod: TXP

## 2024-02-27 PROCEDURE — 80053 COMPREHEN METABOLIC PANEL: CPT | Mod: NTX

## 2024-02-27 PROCEDURE — 99213 OFFICE O/P EST LOW 20 MIN: CPT | Mod: PBBFAC,25 | Performed by: STUDENT IN AN ORGANIZED HEALTH CARE EDUCATION/TRAINING PROGRAM

## 2024-02-27 PROCEDURE — G0101 CA SCREEN;PELVIC/BREAST EXAM: HCPCS | Mod: S$PBB,,, | Performed by: STUDENT IN AN ORGANIZED HEALTH CARE EDUCATION/TRAINING PROGRAM

## 2024-02-27 PROCEDURE — 99215 OFFICE O/P EST HI 40 MIN: CPT | Mod: PBBFAC,27,TXP,25

## 2024-02-27 NOTE — PROGRESS NOTES
Subjective:    Patient ID:  Yamel Shah is a 73 y.o. female who presents for follow-up of Pulmonary Hypertension.    HPI   Mrs. Shah was diagnosed with scleroderma in 1983, followed by Dr. Lim (and previously Dr. Boudreaux). She was on revatio in 2013, then switched to adcirca 40mg po daily, which she has remained on every since. Last clinic visit was February 2023. RHC in March, 2023 shows normal PA pressures, normal filling pressures, normal CO/CI. She is followed closely by Rheum and ALD/lung transplant.    Mrs. Shah feels good today. Has been taking 1/2 tablet of lasix because of tinnitus. She says she still has tinnitus but it's better. She tried spironolactone but also reported tinnitus and stopped this medication as well. She endorses leg swelling, especially in the evening. The lasix does make her urinate. Reports BP runs in the 130s-150s at home. Patient denies chest pain, chest pressure, syncope, pre-syncope, lightheadedness, dizziness, PND, orthopnea, abdominal pain, abdominal pressure, or N/V/F/C.    6MWT:   8/17/2023 9/18/2023   6MW     6MWT Status completed without stopping  completed without stopping    Patient Reported Dyspnea  Dyspnea    Was O2 used? No  No    6MW Distance walked (feet) 1300 feet  1450 feet    Distance walked (meters) 396.24 meters  441.96 meters    Did patient stop? No  No    Oxygen Saturation 98 %  100 %    Supplemental Oxygen Room Air  Room Air    Heart Rate 70 bpm  75 bpm    Blood Pressure 141/63  152/67    Ju Dyspnea Rating  light  light    Oxygen Saturation 96 %  98 %    Supplemental Oxygen Room Air  Room Air    Heart Rate 98 bpm  102 bpm    Blood Pressure 159/70  168/71    Unable to obtain     Ju Dyspnea Rating  somewhat heavy  moderate    Recovery Time (seconds) 100 seconds  123 seconds    Oxygen Saturation 100 %  100 %    Supplemental Oxygen Room Air  Room Air    Heart Rate 82 bpm  90 bpm        ECHO: 8/17/2023  Left Ventricle: The left  "ventricle is normal in size. Normal wall thickness. Normal wall motion. There is normal systolic function with a visually estimated ejection fraction of 60 - 65%. There is normal diastolic function.    Right Ventricle: Normal right ventricular cavity size. Wall thickness is normal. Right ventricle wall motion  is normal. Systolic function is normal.    Pulmonary Artery: The estimated pulmonary artery systolic pressure is 40 mmHg.    RHC: 3/16/2023  RA: 7/ 5/ 5 RV: 35/ 1/ 6 PA: 35/ 10/ 18 PWP: 16/ 16/ 10 .   Cardiac output was 6.9. Cardiac index is 3.9 L/min/m2.   O2 Sat: PA 74%.   Pulmonary vascular resistance: 1.1 JOLLEY.     SaO2:  95%  BSA 1.8 m2  HGB 12 g/dl.    PFTs:     9/18/2023    11:06 AM 8/17/2023     1:27 PM 5/23/2023     9:20 AM 10/7/2022    12:56 PM   Pulmonary Function Tests   FVC 1.76 liters 1.79 liters 1.9 liters 1.82 liters   FEV1 1.46 liters 1.42 liters 1.58 liters 1.49 liters   TLC (liters) 2.7 liters 3.11 liters 3.41 liters 3.2 liters   DLCO (ml/mmHg sec) 9.15 ml/mmHg sec 10.8 ml/mmHg sec 11.37 ml/mmHg sec 11.89 ml/mmHg sec   FVC% 66.1 61.8 71.1 67   FEV1% 70.5 63.8 75.6 71   FEF 25-75 1.64 1.35 1.82 1.7   FEF 25-75% 93.5 73.2 103.5 95   TLC% 52.8 60.9 66.8 63   RV 0.94 1.32 1.46 1.33   RV% 44.2 62.2 68.5 63   DLCO% 42.1 49.7 52.3 54       Review of Systems   Constitutional: Negative.   HENT: Negative.     Eyes: Negative.    Cardiovascular:  Positive for dyspnea on exertion and leg swelling. Negative for chest pain, near-syncope and syncope.   Respiratory:  Positive for cough.    Endocrine: Negative.    Hematologic/Lymphatic: Negative.    Skin: Negative.    Musculoskeletal: Negative.    Gastrointestinal: Negative.    Neurological: Negative.    Psychiatric/Behavioral: Negative.          Objective: BP (!) 141/65 (BP Location: Right arm, Patient Position: Sitting, BP Method: Medium (Automatic))   Pulse 62   Ht 5' 5" (1.651 m)   Wt 69.4 kg (153 lb)   SpO2 100%   BMI 25.46 kg/m²       Physical " Exam  Constitutional:       General: She is not in acute distress.     Appearance: Normal appearance. She is not ill-appearing.   HENT:      Head: Normocephalic.   Cardiovascular:      Rate and Rhythm: Normal rate and regular rhythm.      Pulses: Normal pulses.      Heart sounds: Normal heart sounds.   Pulmonary:      Effort: Pulmonary effort is normal.   Abdominal:      Palpations: Abdomen is soft.   Musculoskeletal:         General: Normal range of motion.      Cervical back: Normal range of motion.      Right lower leg: Edema present.      Left lower leg: Edema present.   Skin:     General: Skin is dry.      Capillary Refill: Capillary refill takes less than 2 seconds.      Comments: Tight skin, sclerotic changes   Neurological:      Mental Status: She is oriented to person, place, and time.   Psychiatric:         Mood and Affect: Mood normal.         Behavior: Behavior normal.           Lab Results   Component Value Date    BNP 53 02/27/2024     02/27/2024    K 4.2 02/27/2024    MG 2.0 02/27/2024     02/27/2024    CO2 25 02/27/2024    BUN 17 02/27/2024    CREATININE 0.7 02/27/2024    GLU 96 02/27/2024    HGBA1C 5.2 10/19/2023    AST 14 02/27/2024    ALT 9 (L) 02/27/2024    ALBUMIN 3.4 (L) 02/27/2024    PROT 8.5 (H) 02/27/2024    BILITOT 0.3 02/27/2024    CHOL 129 10/19/2023    HDL 47 10/19/2023    LDLCALC 71.6 10/19/2023    TRIG 52 10/19/2023       Magnesium   Date Value Ref Range Status   02/27/2024 2.0 1.6 - 2.6 mg/dL Final       Lab Results   Component Value Date    WBC 7.13 02/27/2024    HGB 11.5 (L) 02/27/2024    HCT 36.9 (L) 02/27/2024    MCV 86 02/27/2024     02/27/2024       Lab Results   Component Value Date    INR 1.0 06/29/2015    INR 1.0 01/21/2013    INR 1.0 09/29/2007       BNP   Date Value Ref Range Status   02/27/2024 53 0 - 99 pg/mL Final     Comment:     Values of less than 100 pg/ml are consistent with non-CHF populations.   12/31/2023 180 (H) 0 - 99 pg/mL Final     Comment:      Values of less than 100 pg/ml are consistent with non-CHF populations.   09/18/2023 81 0 - 99 pg/mL Final     Comment:     Values of less than 100 pg/ml are consistent with non-CHF populations.       LD   Date Value Ref Range Status   04/11/2022 184 110 - 260 U/L Final     Comment:     Results are increased in hemolyzed samples.   03/25/2021 149 110 - 260 U/L Final     Comment:     Results are increased in hemolyzed samples.   11/24/2020 142 110 - 260 U/L Final     Comment:     Results are increased in hemolyzed samples.         WHO Group: 1  Functional Class: II  REVEAL Score: 4 (Low Risk)  Assessment:       1. WHO group 1 pulmonary arterial hypertension    2. Shortness of breath    3. ILD (interstitial lung disease)    4. Essential hypertension    5. Raynaud's disease without gangrene    6. Immunosuppression due to drug therapy    7. Scleroderma with pulmonary involvement    8. Skin ulcers of both feet    9. PVD (peripheral vascular disease)         Plan:       Mrs. Shah came to clinic but has not had labs, walk, or ECHO. These tests are scheduled in the next couple of weeks. Will have her get labs today. Will review labs and diagnostic tests and contact patient with results. Has an appointment with lung txp and is scheduling a follow-up with Dr. Ahuja. We discussed increasing exercise but she is limited to walking, and with her hands (amputated fingers) it makes low weight lifting difficult. She is also unable to swim or be in the water 2/2 open blisters on her feet.    Schedule lab work for today.    Will call after results.    Please call Katherine with any questions at [phone]. Please call [phone] with any scheduling questions.      Recommend 2 gram sodium restriction and 1500cc fluid restriction.  Encourage physical activity with graded exercise program.  Requested patient to weigh themselves daily, and to notify us if their weight increases by more than 3 lbs in 1 day or 5 lbs in 1 week.

## 2024-02-27 NOTE — PROGRESS NOTES
History & Physical  Gynecology      SUBJECTIVE:     Chief Complaint: Breast Problem (Nipple lesion  with occasion pain 0/10)       History of Present Illness:  Sharif presents for nipple change. Pimple noted one month ago to right breast. No changes. No leakage. No breast lumps.  No changes in hygiene products or routines.  Has noted an increase in chocolate   Recent covid booster 10/2023  7/2023 normal  Family breast cancer: mother <50. Sister in her 30s.  Many Maternal aunts  Weight fluctuates  No fevers, appetite changes  Some UI but no need to change products throughout the day. Does get up to pee a lot at night, but takes fluid pill at night  Normal bowels  No issues with sex  Safe at home, wears seatbelts    Review of patient's allergies indicates:   Allergen Reactions    Spironolactone Tinitus    Sulfa (sulfonamide antibiotics) Rash     Other reaction(s): Rash    Other reaction(s): Rash   Other reaction(s): Rash    Tramadol Itching and Rash       Past Medical History:   Diagnosis Date    Abnormal Pap smear     Acid reflux     Allergy     Arthritis     Encounter for blood transfusion     GERD (gastroesophageal reflux disease)     Hiatal hernia 03/24/2023    History of migraine headaches     Hx of colonic polyp     Hypertension     Idiopathic neuropathy 07/20/2012    ILD (interstitial lung disease) 11/06/2013    Iron deficiency anemia 03/18/2014    MRSA carrier     Osteopenia     Pneumonia     Pulmonary fibrosis     Pulmonary hypertension     Raynaud's disease     Scleroderma, diffuse     Sjogren's syndrome     Vitamin D deficiency 11/14/2013     Past Surgical History:   Procedure Laterality Date    24 HOUR IMPEDANCE PH MONITORING OF ESOPHAGUS IN PATIENT NOT TAKING ACID REDUCING MEDICATIONS N/A 03/04/2020    Procedure: IMPEDANCE PH STUDY, ESOPHAGEAL, 24 HOUR, IN PATIENT NOT TAKING ACID REDUCING MEDICATION;  Surgeon: Annamaria Mendoza MD;  Location: [facility] ([address] [place] [address]);  Service: Endoscopy;  Laterality: N/A;  OFF  PPI/H2 Blocker   Motility Studies   Hold Narcotics x 1 days   Hold TCA x 1 days  2/26 - LVM attempting to confirm appt  2/27 - Confirmed appt    ANORECTAL MANOMETRY N/A 05/10/2021    Procedure: MANOMETRY, ANORECTAL with balloon expulsion test;  Surgeon: Annamaria Mendoza MD;  Location: [facility] (4TH FLR);  Service: Endoscopy;  Laterality: N/A;  order combined  covid test 5/7 [facility], instructions emailed-KPvt    BREAST BIOPSY      Left, benign    BRONCHOSCOPY N/A 10/2/2023    Procedure: bronch;  Surgeon: Roberta Leigh DO;  Location: [facility] OR 2ND FLR;  Service: Endoscopy;  Laterality: N/A;    CERVICAL CONIZATION   W/ LASER  1970    COLONOSCOPY      COLONOSCOPY N/A 03/29/2019    Procedure: COLONOSCOPY;  Surgeon: Annamaria Mendoza MD;  Location: [facility] (2ND FLR);  Service: Endoscopy;  Laterality: N/A;    COLONOSCOPY N/A 05/10/2021    Procedure: COLONOSCOPY;  Surgeon: Annamaria Mendoza MD;  Location: [facility] (4TH FLR);  Service: Endoscopy;  Laterality: N/A;  2nd floor-previous scopes done on 2nd floor, gastroparesis  full liquid diet x2 days, clear liquid x1 day prior to procedure  covid test 5/7 [facility], instructions emailed-KPvt  5/6 pt confirmed appt-KPvt    DILATION AND CURETTAGE OF UTERUS      ESOPHAGEAL MANOMETRY WITH MEASUREMENT OF IMPEDANCE N/A 03/04/2020    Procedure: MANOMETRY, ESOPHAGUS, WITH IMPEDANCE MEASUREMENT;  Surgeon: Annamaria Mendoza MD;  Location: [facility] (4TH FLR);  Service: Endoscopy;  Laterality: N/A;  OFF PPI/H2 Blocker   Motility Studies   Hold Narcotics x 1 days   Hold TCA x 1 days    ESOPHAGOGASTRODUODENOSCOPY      ESOPHAGOGASTRODUODENOSCOPY N/A 03/29/2019    Procedure: EGD (ESOPHAGOGASTRODUODENOSCOPY);  Surgeon: Annamaria Mendoza MD;  Location: [facility] (2ND FLR);  Service: Endoscopy;  Laterality: N/A;  pulmonary htn    ESOPHAGOGASTRODUODENOSCOPY N/A 02/02/2021    Procedure: ESOPHAGOGASTRODUODENOSCOPY (EGD);  Surgeon: Annamaria Mendoza MD;  Location: [facility] ([address] [place] [address]);  Service:  Endoscopy;  Laterality: N/A;  2nd floor gastroparesis  3 days full liquid diet and 1 day clears  covid test 1/30 primary care, instructions sent to [facility]    ESOPHAGOGASTRODUODENOSCOPY N/A 07/01/2022    Procedure: ESOPHAGOGASTRODUODENOSCOPY (EGD);  Surgeon: Annamaria Mendoza MD;  Location: [address]);  Service: Endoscopy;  Laterality: N/A;  2nd floor-gastroparesis  full liquid diet x3 days, clear liquid diet x1 day prior to procedure  fully vaccinated, instructions sent to [facility]  6/29-pt confirmed new arrival [facility]    EXCISION, LESION, STOMACH, LAPAROSCOPIC N/A 03/23/2023    Procedure: XI ROBOTIC EXCISION, LESION, STOMACH;  Surgeon: Katherine Segura MD;  Location: [address];  Service: General;  Laterality: N/A;    EXCISIONAL BIOPSY, LYMPH NODE Right 05/26/2023    Procedure: EXCISIONAL BIOPSY, LYMPH NODE, INGUINAL;  Surgeon: Katherine Segura MD;  Location: [address];  Service: General;  Laterality: Right;    HYSTERECTOMY  1990    FRANDY (AUB, Fibroids), ovaries remain    REPAIR, HERNIA, UMBILICAL N/A 03/23/2023    Procedure: REPAIR, HERNIA, UMBILICAL;  Surgeon: Katherine Segura MD;  Location: [address];  Service: General;  Laterality: N/A;    RIGHT HEART CATHETERIZATION Right 01/05/2021    Procedure: INSERTION, CATHETER, RIGHT HEART;  Surgeon: Aureliano Sy MD;  Location: [facility] CATH [facility];  Service: Cardiology;  Laterality: Right;    RIGHT HEART CATHETERIZATION Right 03/16/2023    Procedure: INSERTION, CATHETER, RIGHT HEART;  Surgeon: Aureliano Sy MD;  Location: [facility]ATH [facility];  Service: Cardiology;  Laterality: Right;    ROBOT-ASSISTED LAPAROSCOPIC REPAIR OF INGUINAL HERNIA USING DA VERNELL XI Right 05/26/2023    Procedure: XI ROBOTIC REPAIR, HERNIA, INGUINAL, FEMORAL;  Surgeon: Katherine Segura MD;  Location: [address];  Service: General;  Laterality: Right;    ROBOT-ASSISTED REPAIR OF HIATAL HERNIA USING DA VERNELL XI  N/A 2023    Procedure: XI ROBOTIC REPAIR, HERNIA, HIATAL;  Surgeon: Katherine Segura MD;  Location: [facility] OR 2ND FLR;  Service: General;  Laterality: N/A;    VARICOSE VEIN SURGERY      XI ROBOTIC GASTROPEXY N/A 2023    Procedure: XI ROBOTIC GASTROPEXY;  Surgeon: Katherine Segura MD;  Location: [facility] OR 2ND FLR;  Service: General;  Laterality: N/A;    XI ROBOTIC PYLOROMYOTOMY N/A 2023    Procedure: XI ROBOTIC PYLOROMYOTOMY;  Surgeon: Katherine Segura MD;  Location: [facility] OR 2ND FLR;  Service: General;  Laterality: N/A;     OB History          5    Para   4    Term   4            AB   1    Living   4         SAB   1    IAB        Ectopic        Multiple        Live Births   4               Family History   Problem Relation Age of Onset    Breast cancer Mother     Cancer Mother         Breast    Hypertension Father     Diabetes Father         Amputation of legs    Breast cancer Sister     Diabetes Sister     Arthritis Sister     Cancer Sister         Breast    Osteoarthritis Brother     Diabetes Brother     Arthritis Brother     Birth defects Brother         Polio at birth, recently had knee replacement surgery on left knee    No Known Problems Daughter     No Known Problems Daughter     No Known Problems Son     No Known Problems Son     Breast cancer Maternal Aunt     Cancer Maternal Aunt         Breast    Early death Sister     Melanoma Neg Hx     Colon cancer Neg Hx     Crohn's disease Neg Hx     Stomach cancer Neg Hx     Ulcerative colitis Neg Hx     Rectal cancer Neg Hx     Irritable bowel syndrome Neg Hx     Esophageal cancer Neg Hx     Celiac disease Neg Hx     Ovarian cancer Neg Hx     Liver cancer Neg Hx     Pancreatic cancer Neg Hx      Social History     Tobacco Use    Smoking status: Never     Passive exposure: Never    Smokeless tobacco: Never   Substance Use Topics    Alcohol use: Yes     Comment: wine occasionally    Drug use: No       Current  Outpatient Medications   Medication Sig    albuterol (PROVENTIL) 2.5 mg /3 mL (0.083 %) nebulizer solution Take 3 mLs (2.5 mg total) by nebulization every 4 (four) hours as needed for Wheezing or Shortness of Breath (or cough). Rescue    albuterol (VENTOLIN HFA) 90 mcg/actuation inhaler Inhale 2 puffs into the lungs every 4 (four) hours as needed for Wheezing. Rescue    albuterol (VENTOLIN HFA) 90 mcg/actuation inhaler Inhale 2 puffs into the lungs every 6 (six) hours as needed for Wheezing. Rescue    albuterol sulfate 2.5 mg/0.5 mL Nebu Take 2.5 mg by nebulization every 4 (four) hours as needed (shortness of breath/wheezing). Rescue    carboxymethylcellulose sodium (REFRESH TEARS) 0.5 % Drop Apply 1-2 drops to every as needed    ergocalciferol (ERGOCALCIFEROL) 50,000 unit Cap TAKE 1 CAPSULE BY MOUTH EVERY 7 DAYS    furosemide (LASIX) 20 MG tablet Take 0.5 tablets (10 mg total) by mouth once daily.    multivitamin capsule Take 1 capsule by mouth once daily.    mycophenolate mofetil (CELLCEPT) 200 mg/mL SusR Take 5 mLs (1,000 mg total) by mouth 2 (two) times daily.    nabumetone (RELAFEN) 750 MG tablet Take 1 tablet (750 mg total) by mouth daily as needed for Pain.    nebulizer and compressor Rosemary Use as directed    NIFEdipine (ADALAT CC) 90 MG TbSR Take 1 tablet (90 mg total) by mouth nightly. TAKE 1 TABLET(90 MG) BY MOUTH EVERY DAY (TAKES NIGHTLY WITH 30 MG TABLET TOTAL 120 MG)    NIFEdipine (PROCARDIA-XL) 30 MG (OSM) 24 hr tablet TAKE 1 TABLET(30 MG) BY MOUTH EVERY DAY    pravastatin (PRAVACHOL) 20 MG tablet Take 1 tablet (20 mg total) by mouth every evening.    pregabalin (LYRICA) 300 MG Cap Take 1 capsule (300 mg total) by mouth 2 (two) times daily.    PREVIDENT 5000 BOOSTER PLUS 1.1 % Pste SMARTSIG:To Teeth    PREVIDENT 5000 SENSITIVE 1.1-5 % Pste BRUSH FOR 2 MINUTES TWICE PER DAY    RABEprazole (ACIPHEX) 20 mg tablet Take 1 tablet (20 mg total) by mouth 2 (two) times daily.    tadalafil (ADCIRCA) 20 mg Tab  Take 2 tablets (40 mg total) by mouth once daily. (Patient taking differently: Take 40 mg by mouth every evening.)    tiZANidine (ZANAFLEX) 4 MG tablet Take 2 tablets (8 mg total) by mouth nightly as needed (muscle pain).     No current facility-administered medications for this visit.     Facility-Administered Medications Ordered in Other Visits   Medication    fentaNYL 50 mcg/mL injection 25 mcg    haloperidol lactate injection 0.5 mg    sodium chloride 0.9% flush 10 mL         Review of Systems:  Review of Systems   Constitutional:  Negative for activity change, appetite change, fever and unexpected weight change.   Respiratory:  Negative for shortness of breath.    Cardiovascular:  Negative for chest pain.   Gastrointestinal:  Negative for abdominal pain, blood in stool, constipation, diarrhea, nausea and vomiting.   Endocrine: Negative for hot flashes.   Genitourinary:  Positive for urinary incontinence. Negative for dyspareunia, dysuria, frequency, hematuria, pelvic pain, urgency, vaginal bleeding, vaginal discharge, vaginal pain, postcoital bleeding, postmenopausal bleeding and vaginal dryness.   Integumentary:  Positive for breast mass. Negative for nipple discharge, breast skin changes and breast tenderness.   Breast: Positive for breast self exam and mass.Negative for lump, nipple discharge, skin changes and tenderness       OBJECTIVE:     Physical Exam:  Physical Exam  Vitals and nursing note reviewed.   Constitutional:       General: She is not in acute distress.     Appearance: She is well-developed. She is not diaphoretic.   HENT:      Head: Normocephalic and atraumatic.   Eyes:      General: No scleral icterus.        Right eye: No discharge.         Left eye: No discharge.      Conjunctiva/sclera: Conjunctivae normal.   Neck:      Thyroid: No thyromegaly.   Cardiovascular:      Rate and Rhythm: Normal rate.      Heart sounds: No murmur heard.  Pulmonary:      Effort: Pulmonary effort is normal.    Chest:   Breasts:     Breasts are symmetrical.      Right: No inverted nipple, mass, nipple discharge, skin change or tenderness.      Left: No inverted nipple, mass, nipple discharge, skin change or tenderness.       Abdominal:      General: There is no distension.      Palpations: Abdomen is soft.      Tenderness: There is no abdominal tenderness.   Genitourinary:     Labia:         Right: No rash, tenderness, lesion or injury.         Left: No rash, tenderness, lesion or injury.       Vagina: No signs of injury and foreign body. No erythema, tenderness or bleeding.      Adnexa:         Right: No mass, tenderness or fullness.          Left: No mass, tenderness or fullness.        Comments: Uterus surgically absent. Atrophy apparent throughout   Musculoskeletal:         General: Normal range of motion.      Cervical back: Normal range of motion and neck supple.   Lymphadenopathy:      Cervical: No cervical adenopathy.   Skin:     General: Skin is warm and dry.      Findings: No erythema or rash.   Neurological:      Mental Status: She is alert and oriented to person, place, and time.           ASSESSMENT:       ICD-10-CM ICD-9-CM    1. Breast pain  N64.4 611.71              Plan:      WWE  - Vaccines utd  - Pap n/a  - Mammogram utd  - Colonoscopy utd  - DEXA utd  - Daily vitamin discussed.  - Consistent condom use discussed.   - CBE normal. Suspect EIC/skin change more than breast change. Recommend hot towel compress to help it release. Rest of  Physical exam normal. VSS.  - Recommend moving fluid pill to AM if cardiology ok with it to help with nocturia  - RTC for annual or PRN.     Counseling time: 30 minutes    Miriam Reyes

## 2024-02-27 NOTE — PATIENT INSTRUCTIONS
Schedule lab work for today.    Will call after results.    Please call Katherine with any questions at [phone]. Please call [phone] with any scheduling questions.      Recommend 2 gram sodium restriction and 1500cc fluid restriction.  Encourage physical activity with graded exercise program.  Requested patient to weigh themselves daily, and to notify us if their weight increases by more than 3 lbs in 1 day or 5 lbs in 1 week.

## 2024-02-27 NOTE — LETTER
February 27, 2024        Luis Ahuja  [address] [facility] [address]  Phone: [phone]  Fax: [phone]             [facility]  [address]  [facility] [address]  Phone: [phone]   Patient: Yamel Shah   MR Number: [id number]   YOB: 1950   Date of Visit: 2/27/2024       Dear Dr. Luis Ahuja    Thank you for referring Yamel Shah to me for evaluation. Attached you will find relevant portions of my assessment and plan of care.    If you have questions, please do not hesitate to call me. I look forward to following Yamel Shah along with you.    Sincerely,    Claire Pelaez, NP    Enclosure    If you would like to receive this communication electronically, please contact [email or web address] or [phone] to request [employer] access.    [employer] is a tool which provides read-only access to select patient information with whom you have a relationship. Its easy to use and provides real time access to review your patients record including encounter summaries, notes, results, and demographic information.    If you feel you have received this communication in error or would no longer like to receive these types of communications, please e-mail [email or web address]

## 2024-02-29 ENCOUNTER — TELEPHONE (OUTPATIENT)
Dept: TRANSPLANT | Facility: CLINIC | Age: 74
End: 2024-02-29
Payer: MEDICARE

## 2024-03-04 ENCOUNTER — HOSPITAL ENCOUNTER (OUTPATIENT)
Dept: PULMONOLOGY | Facility: CLINIC | Age: 74
Discharge: HOME | End: 2024-03-04
Payer: MEDICARE

## 2024-03-04 ENCOUNTER — OFFICE VISIT (OUTPATIENT)
Dept: TRANSPLANT | Facility: CLINIC | Age: 74
End: 2024-03-04
Payer: MEDICARE

## 2024-03-04 VITALS
BODY MASS INDEX: 25.01 KG/M2 | RESPIRATION RATE: 20 BRPM | OXYGEN SATURATION: 99 % | HEIGHT: 65 IN | HEART RATE: 78 BPM | DIASTOLIC BLOOD PRESSURE: 66 MMHG | SYSTOLIC BLOOD PRESSURE: 146 MMHG | TEMPERATURE: 98 F | WEIGHT: 150.13 LBS

## 2024-03-04 VITALS — HEIGHT: 65 IN | BODY MASS INDEX: 25.01 KG/M2 | WEIGHT: 150.13 LBS

## 2024-03-04 DIAGNOSIS — M34.9 SCLERODERMA WITH PULMONARY INVOLVEMENT: ICD-10-CM

## 2024-03-04 DIAGNOSIS — I27.21 WHO GROUP 1 PULMONARY ARTERIAL HYPERTENSION: ICD-10-CM

## 2024-03-04 DIAGNOSIS — J84.9 ILD (INTERSTITIAL LUNG DISEASE): ICD-10-CM

## 2024-03-04 DIAGNOSIS — D84.821 IMMUNOSUPPRESSION DUE TO DRUG THERAPY: ICD-10-CM

## 2024-03-04 DIAGNOSIS — Z79.899 IMMUNOSUPPRESSION DUE TO DRUG THERAPY: ICD-10-CM

## 2024-03-04 DIAGNOSIS — M34.9 SCLERODERMA WITH PULMONARY INVOLVEMENT: Primary | ICD-10-CM

## 2024-03-04 LAB
DLCO ADJ PRE: 11.34 ML/(MIN*MMHG) (ref 15.88–27.34)
DLCO SINGLE BREATH LLN: 15.88
DLCO SINGLE BREATH PRE REF: 49.1 %
DLCO SINGLE BREATH REF: 21.61
DLCOC SBVA LLN: 2.84
DLCOC SBVA PRE REF: 96.6 %
DLCOC SBVA REF: 4.23
DLCOC SINGLE BREATH LLN: 15.88
DLCOC SINGLE BREATH PRE REF: 52.5 %
DLCOC SINGLE BREATH REF: 21.61
DLCOCSBVAULN: 5.62
DLCOCSINGLEBREATHULN: 27.34
DLCOSINGLEBREATHULN: 27.34
DLCOVA LLN: 2.84
DLCOVA PRE REF: 90.4 %
DLCOVA PRE: 3.83 ML/(MIN*MMHG*L) (ref 2.84–5.62)
DLCOVA REF: 4.23
DLCOVAULN: 5.62
DLVAADJ PRE: 4.09 ML/(MIN*MMHG*L) (ref 2.84–5.62)
ERV LLN: -16449.37
ERV PRE REF: 62.5 %
ERV REF: 0.63
ERVULN: ABNORMAL
FEF 25 75 LLN: 0.83
FEF 25 75 PRE REF: 72.2 %
FEF 25 75 REF: 1.83
FEV05 LLN: 0.87
FEV05 REF: 1.72
FEV1 FVC LLN: 64
FEV1 FVC PRE REF: 101.5 %
FEV1 FVC REF: 78
FEV1 LLN: 1.6
FEV1 PRE REF: 64.4 %
FEV1 REF: 2.21
FRCPLETH LLN: 1.95
FRCPLETH PREREF: 62.1 %
FRCPLETH REF: 2.77
FRCPLETHULN: 3.59
FVC LLN: 2.08
FVC PRE REF: 62.9 %
FVC REF: 2.88
IVC PRE: 1.83 L (ref 2.08–3.71)
IVC SINGLE BREATH LLN: 2.08
IVC SINGLE BREATH PRE REF: 63.5 %
IVC SINGLE BREATH REF: 2.88
IVCSINGLEBREATHULN: 3.71
LLN IC: -16447.94
PEF LLN: 3.88
PEF PRE REF: 95.9 %
PEF REF: 5.65
PHYSICIAN COMMENT: ABNORMAL
PRE DLCO: 10.62 ML/(MIN*MMHG) (ref 15.88–27.34)
PRE ERV: 0.39 L (ref -16449.37–16450.63)
PRE FEF 25 75: 1.32 L/S (ref 0.83–3.28)
PRE FET 100: 6.26 SEC
PRE FEV05 REF: 70.3 %
PRE FEV1 FVC: 78.92 % (ref 64.17–89.41)
PRE FEV1: 1.43 L (ref 1.6–2.8)
PRE FEV5: 1.21 L (ref 0.87–2.58)
PRE FRC PL: 1.72 L (ref 1.95–3.59)
PRE FVC: 1.81 L (ref 2.08–3.71)
PRE IC: 1.56 L (ref -16447.94–16452.06)
PRE PEF: 5.42 L/S (ref 3.88–7.41)
PRE REF IC: 76.1 %
PRE RV: 1.33 L (ref 1.56–2.72)
PRE TLC: 3.28 L (ref 4.12–6.09)
RAW PRE REF: 145.8 %
RAW PRE: 4.46 CMH2O*S/L (ref 3.06–3.06)
RAW REF: 3.06
REF IC: 2.06
RV LLN: 1.56
RV PRE REF: 61.9 %
RV REF: 2.14
RVTLC LLN: 34
RVTLC PRE REF: 92.9 %
RVTLC PRE: 40.36 % (ref 33.85–53.03)
RVTLC REF: 43
RVTLCULN: 53
RVULN: 2.72
SGAW PRE REF: 91.8 %
SGAW PRE: 0.09 1/(CMH2O*S) (ref 0.1–0.1)
SGAW REF: 0.1
TLC LLN: 4.12
TLC PRE REF: 64.3 %
TLC REF: 5.11
TLC ULN: 6.09
ULN IC: ABNORMAL
VA PRE: 2.77 L (ref 4.96–4.96)
VA SINGLE BREATH LLN: 4.96
VA SINGLE BREATH PRE REF: 56 %
VA SINGLE BREATH REF: 4.96
VASINGLEBREATHULN: 4.96
VC LLN: 2.08
VC PRE REF: 68.1 %
VC PRE: 1.96 L (ref 2.08–3.71)
VC REF: 2.88
VC ULN: 3.71

## 2024-03-04 PROCEDURE — 99213 OFFICE O/P EST LOW 20 MIN: CPT | Mod: PBBFAC,25,NTX | Performed by: INTERNAL MEDICINE

## 2024-03-04 PROCEDURE — 99214 OFFICE O/P EST MOD 30 MIN: CPT | Mod: 25,S$PBB,NTX, | Performed by: INTERNAL MEDICINE

## 2024-03-04 PROCEDURE — 94010 BREATHING CAPACITY TEST: CPT | Mod: PBBFAC,NTX | Performed by: INTERNAL MEDICINE

## 2024-03-04 PROCEDURE — 99999 PR PBB SHADOW E&M-EST. PATIENT-LVL III: CPT | Mod: PBBFAC,TXP,, | Performed by: INTERNAL MEDICINE

## 2024-03-04 PROCEDURE — 94618 PULMONARY STRESS TESTING: CPT | Mod: 26,S$PBB,NTX, | Performed by: INTERNAL MEDICINE

## 2024-03-04 PROCEDURE — 94726 PLETHYSMOGRAPHY LUNG VOLUMES: CPT | Mod: 26,S$PBB,NTX, | Performed by: INTERNAL MEDICINE

## 2024-03-04 PROCEDURE — 94726 PLETHYSMOGRAPHY LUNG VOLUMES: CPT | Mod: PBBFAC,NTX | Performed by: INTERNAL MEDICINE

## 2024-03-04 PROCEDURE — 94010 BREATHING CAPACITY TEST: CPT | Mod: 26,59,S$PBB,NTX | Performed by: INTERNAL MEDICINE

## 2024-03-04 PROCEDURE — 94618 PULMONARY STRESS TESTING: CPT | Mod: PBBFAC,NTX | Performed by: INTERNAL MEDICINE

## 2024-03-04 PROCEDURE — 94729 DIFFUSING CAPACITY: CPT | Mod: 26,S$PBB,NTX, | Performed by: INTERNAL MEDICINE

## 2024-03-04 PROCEDURE — 94729 DIFFUSING CAPACITY: CPT | Mod: PBBFAC,NTX | Performed by: INTERNAL MEDICINE

## 2024-03-04 NOTE — LETTER
March 6, 2024        Luis Ahuja  [address]  [facility] [address]  Phone: [phone]  Fax: [phone]             [facility] 1st Fl  [address]  [facility] [address]  Phone: [phone]   Patient: Yamel Shah   MR Number: [id number]   YOB: 1950   Date of Visit: 3/4/2024       Dear Dr. Luis Ahuja    Thank you for referring Yamel Shah to me for evaluation. Attached you will find relevant portions of my assessment and plan of care.    If you have questions, please do not hesitate to call me. I look forward to following Yamel Shah along with you.    Sincerely,    Roberta Leigh, DO    Enclosure    If you would like to receive this communication electronically, please contact [email or web address] or [phone] to request [employer] access.    [employer] is a tool which provides read-only access to select patient information with whom you have a relationship. Its easy to use and provides real time access to review your patients record including encounter summaries, notes, results, and demographic information.    If you feel you have received this communication in error or would no longer like to receive these types of communications, please e-mail [email or web address]

## 2024-03-05 ENCOUNTER — PATIENT OUTREACH (OUTPATIENT)
Dept: ADMINISTRATIVE | Facility: HOSPITAL | Age: 74
End: 2024-03-05
Payer: MEDICARE

## 2024-03-05 NOTE — PROCEDURES
Yamel Shah is a 73 y.o.  female patient, who presents for a 6 minute walk test ordered by DO Mag.  The diagnosis is Interstitial Lung Disease; Scleroderma/CREST, Pulmonary Hypertension.  The patient's BMI is 25 kg/m2.  Predicted distance (lower limit of normal) is 302.24 meters.      Test Results:    The test was completed without stopping. The total time walked was 360 seconds. During walking, the patient reported:  Dyspnea; Right Knee/Foot pain. The patient used no assistive devices during testing.     03/04/2024---------Distance: 476.1 meters (1562 feet)     O2 Sat % Supplemental Oxygen Heart Rate Blood Pressure Ju Scale   Pre-exercise  (Resting) 100 % Room Air 77 bpm 134/88 mmHg 2   During Exercise 96 % Room Air 111 bpm 156/80 mmHg 4   Post-exercise  (Recovery) 100 % Room Air  84 bpm 138/89 mmHg      Recovery Time: 121 seconds    Performing nurse/tech: Ap [email or web address]      PREVIOUS STUDY:   09/18/2023---------Distance: 441.96 meters (1450 feet)       O2 Sat % Supplemental Oxygen Heart Rate Blood Pressure Ju Scale   Pre-exercise  (Resting) 100 % Room Air 75 bpm 152/67 mmHg 2   During Exercise 98 % Room Air 102 bpm 168/71 mmHg 3   Post-exercise  (Recovery) 100 % Room Air  90 bpm             CLINICAL INTERPRETATION:  Six minute walk distance is 476.1 meters (1562 feet) with somewhat heavy dyspnea.  During exercise, there was no significant desaturation while breathing room air.  Both blood pressure and heart rate increased significantly with walking.  The patient reported non-pulmonary symptoms during exercise.  Since the previous study in September 2023, exercise capacity is unchanged.  Based upon age and body mass index, exercise capacity is normal.

## 2024-03-06 ENCOUNTER — TELEPHONE (OUTPATIENT)
Dept: INTERNAL MEDICINE | Facility: CLINIC | Age: 74
End: 2024-03-06
Payer: MEDICARE

## 2024-03-06 DIAGNOSIS — M34.9 SCLERODERMA: ICD-10-CM

## 2024-03-06 DIAGNOSIS — E78.5 HYPERLIPIDEMIA: ICD-10-CM

## 2024-03-06 RX ORDER — PRAVASTATIN SODIUM 20 MG/1
20 TABLET ORAL NIGHTLY
Qty: 90 TABLET | Refills: 0 | Status: SHIPPED | OUTPATIENT
Start: 2024-03-06 | End: 2024-03-26

## 2024-03-06 NOTE — TELEPHONE ENCOUNTER
----- Message from Anay Garcia MA sent at 3/5/2024  5:05 PM CST -----  Regarding: FW: Colon Cancer Screening    ----- Message -----  From: Cady Little MA  Sent: 3/5/2024  12:46 PM CST  To: Aly Rand MD  Subject: Colon Cancer Screening                           Pt would like to schedule a colonoscopy. Pt needs order for colpomicroscopy.

## 2024-03-06 NOTE — TELEPHONE ENCOUNTER
No care due was identified.  Health [facility] Embedded Care Due Messages. Reference number: [id number].   3/06/2024 11:26:37 AM CST

## 2024-03-06 NOTE — TELEPHONE ENCOUNTER
Review of the patient's last colonoscopy report and GI recommendations showed a recommendation for follow-up colonoscopy in 5 years which would be 2026.  Were there any new GI recommendations since then ?

## 2024-03-06 NOTE — TELEPHONE ENCOUNTER
Lov 10/26/23,   Nov  3/26/24    Cady ahmadi spoke to patient about need  To do screening colonoscopy.  She agreed to do.    Please enter order for procedure.    Thanks [email or web address]

## 2024-03-06 NOTE — PROGRESS NOTES
ADVANCED LUNG DISEASE CLINIC FOLLOW-UP                                                                                                                                             Reason for Visit:  SSc-ILD    Referring Physician: Winifred Cordon DO    History of Present Illness: Yamel Shah is a 73 y.o. female who is on 0L of oxygen.  She is on no assisted ventilation.  Her New York Heart Association Class is II and a Karnofsky score of 90% - Able to carry on normal activity: minor symptoms of disease. She is not diabetic.    She presents today for routine SSc-ILD follow-up. She was last seen around October when she had a bronchoscopy and results did not show any infections.  She was treated for PNA in Dec.  Since then, she feels like she is back to baseline.  She has baseline dyspnea with exertion.  She tries to remain active but is limited by ulcers on her feet.  Continues to have reflux despite PPI therapy, but sleeps on an incline and is compliant with diet modification. Has PH and remains on Adcirca.  She takes MMF for SSc.     Past Medical History:   Diagnosis Date    Abnormal Pap smear     Acid reflux     Allergy     Arthritis     Encounter for blood transfusion     GERD (gastroesophageal reflux disease)     Hiatal hernia 03/24/2023    History of migraine headaches     Hx of colonic polyp     Hypertension     Idiopathic neuropathy 07/20/2012    ILD (interstitial lung disease) 11/06/2013    Iron deficiency anemia 03/18/2014    MRSA carrier     Osteopenia     Pneumonia     Pulmonary fibrosis     Pulmonary hypertension     Raynaud's disease     Scleroderma, diffuse     Sjogren's syndrome     Vitamin D deficiency 11/14/2013       Past Surgical History:   Procedure Laterality Date    24 HOUR IMPEDANCE PH MONITORING OF ESOPHAGUS IN PATIENT NOT TAKING ACID REDUCING MEDICATIONS N/A 03/04/2020    Procedure: IMPEDANCE PH STUDY, ESOPHAGEAL, 24 HOUR, IN PATIENT NOT TAKING ACID REDUCING MEDICATION;   Surgeon: Annamaria Mendoza MD;  Location: [facility] (4TH FLR);  Service: Endoscopy;  Laterality: N/A;  OFF PPI/H2 Blocker   Motility Studies   Hold Narcotics x 1 days   Hold TCA x 1 days  2/26 - LVM attempting to confirm appt  2/27 - Confirmed appt    ANORECTAL MANOMETRY N/A 05/10/2021    Procedure: MANOMETRY, ANORECTAL with balloon expulsion test;  Surgeon: Annamaria Mendoza MD;  Location: [facility] ENDO (4TH FLR);  Service: Endoscopy;  Laterality: N/A;  order combined  covid test 5/7 [facility], instructions emailed-KPvt    BREAST BIOPSY      Left, benign    BRONCHOSCOPY N/A 10/2/2023    Procedure: bronch;  Surgeon: Roberta Leigh DO;  Location: [facility] OR 2ND FLR;  Service: Endoscopy;  Laterality: N/A;    CERVICAL CONIZATION   W/ LASER  1970    COLONOSCOPY      COLONOSCOPY N/A 03/29/2019    Procedure: COLONOSCOPY;  Surgeon: Annamaria Mendoza MD;  Location: [facility] (2ND FLR);  Service: Endoscopy;  Laterality: N/A;    COLONOSCOPY N/A 05/10/2021    Procedure: COLONOSCOPY;  Surgeon: Annamaria Mendoza MD;  Location: [facility] (4TH FLR);  Service: Endoscopy;  Laterality: N/A;  2nd floor-previous scopes done on 2nd floor, gastroparesis  full liquid diet x2 days, clear liquid x1 day prior to procedure  covid test 5/7 [facility], instructions emailed-KPvt  5/6 pt confirmed appt-KPvt    DILATION AND CURETTAGE OF UTERUS      ESOPHAGEAL MANOMETRY WITH MEASUREMENT OF IMPEDANCE N/A 03/04/2020    Procedure: MANOMETRY, ESOPHAGUS, WITH IMPEDANCE MEASUREMENT;  Surgeon: Annamaria Mendoza MD;  Location: [facility] ENDO (4TH FLR);  Service: Endoscopy;  Laterality: N/A;  OFF PPI/H2 Blocker   Motility Studies   Hold Narcotics x 1 days   Hold TCA x 1 days    ESOPHAGOGASTRODUODENOSCOPY      ESOPHAGOGASTRODUODENOSCOPY N/A 03/29/2019    Procedure: EGD (ESOPHAGOGASTRODUODENOSCOPY);  Surgeon: Annamaria Mendoza MD;  Location: [facility] ENDO (2ND FLR);  Service: Endoscopy;  Laterality: N/A;  pulmonary htn    ESOPHAGOGASTRODUODENOSCOPY N/A 02/02/2021    Procedure:  ESOPHAGOGASTRODUODENOSCOPY (EGD);  Surgeon: Annamaria Mendoza MD;  Location: [facility] ENDO (2ND FLR);  Service: Endoscopy;  Laterality: N/A;  2nd floor gastroparesis  3 days full liquid diet and 1 day clears  covid test 1/30 primary care, instructions sent to [facility]    ESOPHAGOGASTRODUODENOSCOPY N/A 07/01/2022    Procedure: ESOPHAGOGASTRODUODENOSCOPY (EGD);  Surgeon: Annamaria Mendoza MD;  Location: [facility] ENDO (2ND FLR);  Service: Endoscopy;  Laterality: N/A;  2nd floor-gastroparesis  full liquid diet x3 days, clear liquid diet x1 day prior to procedure  fully vaccinated, instructions sent to [facility]  [facility]    EXCISION, LESION, STOMACH, LAPAROSCOPIC N/A 03/23/2023    Procedure: XI ROBOTIC EXCISION, LESION, STOMACH;  Surgeon: Katherine Segura MD;  Location: [facility] OR [address] [place] [address];  Service: General;  Laterality: N/A;    EXCISIONAL BIOPSY, LYMPH NODE Right 05/26/2023    Procedure: EXCISIONAL BIOPSY, LYMPH NODE, INGUINAL;  Surgeon: Katherine Segura MD;  Location: [facility] OR [address] [place] [address];  Service: General;  Laterality: Right;    HYSTERECTOMY  1990    FRANDY (AUB, Fibroids), ovaries remain    REPAIR, HERNIA, UMBILICAL N/A 03/23/2023    Procedure: REPAIR, HERNIA, UMBILICAL;  Surgeon: Katherine Segura MD;  Location: [facility] OR [address] [place] [address];  Service: General;  Laterality: N/A;    RIGHT HEART CATHETERIZATION Right 01/05/2021    Procedure: INSERTION, CATHETER, RIGHT HEART;  Surgeon: Aureliano Sy MD;  Location: [facility] CATH LAB;  Service: Cardiology;  Laterality: Right;    RIGHT HEART CATHETERIZATION Right 03/16/2023    Procedure: INSERTION, CATHETER, RIGHT HEART;  Surgeon: Aureliano Sy MD;  Location: [facility] CATH LAB;  Service: Cardiology;  Laterality: Right;    ROBOT-ASSISTED LAPAROSCOPIC REPAIR OF INGUINAL HERNIA USING DA VERNELL XI Right 05/26/2023    Procedure: XI ROBOTIC REPAIR, HERNIA, INGUINAL, FEMORAL;  Surgeon: Katherine Segura MD;  Location: [facility] OR  2ND FLR;  Service: General;  Laterality: Right;    ROBOT-ASSISTED REPAIR OF HIATAL HERNIA USING DA VERNELL XI N/A 03/23/2023    Procedure: XI ROBOTIC REPAIR, HERNIA, HIATAL;  Surgeon: Katherine Segura MD;  Location: [facility] OR 2ND FLR;  Service: General;  Laterality: N/A;    VARICOSE VEIN SURGERY      XI ROBOTIC GASTROPEXY N/A 03/23/2023    Procedure: XI ROBOTIC GASTROPEXY;  Surgeon: Katherine Segura MD;  Location: [facility] OR 2ND FLR;  Service: General;  Laterality: N/A;    XI ROBOTIC PYLOROMYOTOMY N/A 03/23/2023    Procedure: XI ROBOTIC PYLOROMYOTOMY;  Surgeon: Katherine Segura MD;  Location: [facility] OR 2ND FLR;  Service: General;  Laterality: N/A;       Allergies: Spironolactone, Sulfa (sulfonamide antibiotics), and Tramadol    Current Outpatient Medications   Medication Sig    albuterol (PROVENTIL) 2.5 mg /3 mL (0.083 %) nebulizer solution Take 3 mLs (2.5 mg total) by nebulization every 4 (four) hours as needed for Wheezing or Shortness of Breath (or cough). Rescue    albuterol (VENTOLIN HFA) 90 mcg/actuation inhaler Inhale 2 puffs into the lungs every 4 (four) hours as needed for Wheezing. Rescue    albuterol (VENTOLIN HFA) 90 mcg/actuation inhaler Inhale 2 puffs into the lungs every 6 (six) hours as needed for Wheezing. Rescue    albuterol sulfate 2.5 mg/0.5 mL Nebu Take 2.5 mg by nebulization every 4 (four) hours as needed (shortness of breath/wheezing). Rescue    carboxymethylcellulose sodium (REFRESH TEARS) 0.5 % Drop Apply 1-2 drops to every as needed    ergocalciferol (ERGOCALCIFEROL) 50,000 unit Cap TAKE 1 CAPSULE BY MOUTH EVERY 7 DAYS    furosemide (LASIX) 20 MG tablet Take 0.5 tablets (10 mg total) by mouth once daily.    multivitamin capsule Take 1 capsule by mouth once daily.    mycophenolate mofetil (CELLCEPT) 200 mg/mL SusR Take 5 mLs (1,000 mg total) by mouth 2 (two) times daily.    nabumetone (RELAFEN) 750 MG tablet Take 1 tablet (750 mg total) by mouth daily as needed for Pain.     nebulizer and compressor Rosemary Use as directed    NIFEdipine (ADALAT CC) 90 MG TbSR Take 1 tablet (90 mg total) by mouth nightly. TAKE 1 TABLET(90 MG) BY MOUTH EVERY DAY (TAKES NIGHTLY WITH 30 MG TABLET TOTAL 120 MG)    NIFEdipine (PROCARDIA-XL) 30 MG (OSM) 24 hr tablet TAKE 1 TABLET(30 MG) BY MOUTH EVERY DAY    pravastatin (PRAVACHOL) 20 MG tablet Take 1 tablet (20 mg total) by mouth every evening.    pregabalin (LYRICA) 300 MG Cap Take 1 capsule (300 mg total) by mouth 2 (two) times daily.    PREVIDENT 5000 BOOSTER PLUS 1.1 % Pste SMARTSIG:To Teeth    PREVIDENT 5000 SENSITIVE 1.1-5 % Pste BRUSH FOR 2 MINUTES TWICE PER DAY    RABEprazole (ACIPHEX) 20 mg tablet Take 1 tablet (20 mg total) by mouth 2 (two) times daily.    tadalafil (ADCIRCA) 20 mg Tab Take 2 tablets (40 mg total) by mouth once daily. (Patient taking differently: Take 40 mg by mouth every evening.)    tiZANidine (ZANAFLEX) 4 MG tablet Take 2 tablets (8 mg total) by mouth nightly as needed (muscle pain).     No current facility-administered medications for this visit.     Facility-Administered Medications Ordered in Other Visits   Medication    fentaNYL 50 mcg/mL injection 25 mcg    haloperidol lactate injection 0.5 mg    sodium chloride 0.9% flush 10 mL       Immunization History   Administered Date(s) Administered    COVID-19 MRNA, LN-S PF (MODERNA HALF 0.25 ML DOSE) 04/21/2022    COVID-19, MRNA, LN-S, PF (MODERNA FULL 0.5 ML DOSE) 02/09/2021, 03/12/2021, 09/21/2021    COVID-19, MRNA, LN-S, PF (Pfizer) (Purple Cap) 10/13/2022    COVID-19, mRNA, LNP-S, PF (Moderna 2023)Ages 12+ 10/18/2023    COVID-19, mRNA, LNP-S, bivalent booster, PF (Moderna Omicron)12 + YEARS 10/13/2022, 10/13/2022    Influenza 11/02/2015, 09/21/2021, 09/22/2023    Influenza (FLUAD) - Quadrivalent - Adjuvanted - PF *Preferred* (65+) 09/11/2020, 09/12/2022, 09/22/2023    Influenza - High Dose - PF (65 years and older) 10/09/2017, 10/11/2017, 10/27/2018, 09/09/2019    Influenza -  Quadrivalent - PF *Preferred* (6 months and older) 11/03/2006, 10/08/2007, 09/29/2009, 09/26/2014, 09/26/2014, 11/02/2015, 11/02/2015, 09/27/2016    Influenza - Trivalent (ADULT) 11/03/2006, 10/08/2007, 09/29/2009, 09/26/2014    Influenza - Trivalent - PF (ADULT) 11/02/2015    Influenza Split 11/03/2006, 10/08/2007, 09/29/2009, 09/26/2014    Pneumococcal Conjugate - 13 Valent 01/16/2013, 01/16/2013    Pneumococcal Polysaccharide - 23 Valent 09/27/2016, 11/12/2021    RSVpreF (Arexvy) 10/27/2023    Tdap 02/06/2019    Zoster 01/16/2013    Zoster Recombinant 11/30/2018, 02/12/2019     Family History:    Family History   Problem Relation Age of Onset    Breast cancer Mother     Cancer Mother         Breast    Hypertension Father     Diabetes Father         Amputation of legs    Breast cancer Sister     Diabetes Sister     Arthritis Sister     Cancer Sister         Breast    Osteoarthritis Brother     Diabetes Brother     Arthritis Brother     Birth defects Brother         Polio at birth, recently had knee replacement surgery on left knee    No Known Problems Daughter     No Known Problems Daughter     No Known Problems Son     No Known Problems Son     Breast cancer Maternal Aunt     Cancer Maternal Aunt         Breast    Early death Sister     Melanoma Neg Hx     Colon cancer Neg Hx     Crohn's disease Neg Hx     Stomach cancer Neg Hx     Ulcerative colitis Neg Hx     Rectal cancer Neg Hx     Irritable bowel syndrome Neg Hx     Esophageal cancer Neg Hx     Celiac disease Neg Hx     Ovarian cancer Neg Hx     Liver cancer Neg Hx     Pancreatic cancer Neg Hx      Social History     Substance and Sexual Activity   Alcohol Use Yes    Comment: wine occasionally      Social History     Substance and Sexual Activity   Drug Use No      Social History     Socioeconomic History    Marital status:      Spouse name: Lewis    Number of children: 4   Occupational History    Occupation: -retired     Employer: [employer]     Comment: [employer]     Employer: RETIRED   Tobacco Use    Smoking status: Never     Passive exposure: Never    Smokeless tobacco: Never   Substance and Sexual Activity    Alcohol use: Yes     Comment: wine occasionally    Drug use: No    Sexual activity: Yes     Partners: Male     Birth control/protection: Post-menopausal, None, See Surgical Hx     Comment: Hysterectomy   Other Topics Concern    Are you pregnant or think you may be? No    Breast-feeding No   Social History Narrative         Social Determinants of Health     Financial Resource Strain: Low Risk  (12/18/2023)    Overall Financial Resource Strain (CARDIA)     Difficulty of Paying Living Expenses: Not hard at all   Food Insecurity: No Food Insecurity (12/18/2023)    Hunger Vital Sign     Worried About Running Out of Food in the Last Year: Never true     Ran Out of Food in the Last Year: Never true   Transportation Needs: No Transportation Needs (12/18/2023)    PRAPARE - Transportation     Lack of Transportation (Medical): No     Lack of Transportation (Non-Medical): No   Physical Activity: Insufficiently Active (12/18/2023)    Exercise Vital Sign     Days of Exercise per Week: 2 days     Minutes of Exercise per Session: 10 min   Stress: No Stress Concern Present (12/18/2023)    [employer] - Occupational Stress Questionnaire     Feeling of Stress : Only a little   Social Connections: Unknown (12/18/2023)    Social Connection and Isolation Panel [NHANES]     Frequency of Communication with Friends and Family: Twice a week     Frequency of Social Gatherings with Friends and Family: Patient declined     Active Member of Clubs or Organizations: No     Attends Club or Organization Meetings: Never     Marital Status:    Housing Stability: Low Risk  (12/18/2023)    Housing Stability Vital Sign     Unable to Pay for Housing in the Last Year: No     Number of Places Lived in the Last Year: 1      "Unstable Housing in the Last Year: No     Review of Systems   Constitutional:  Negative for chills, diaphoresis, fever, malaise/fatigue and weight loss.   HENT:  Negative for congestion, ear discharge, ear pain, hearing loss, nosebleeds, sinus pain, sore throat and tinnitus.    Eyes:  Negative for blurred vision, double vision, photophobia, pain, discharge and redness.   Respiratory:  Positive for cough and shortness of breath (heavy exertion only). Negative for hemoptysis, sputum production, wheezing and stridor.    Cardiovascular:  Negative for chest pain, palpitations, orthopnea, claudication, leg swelling and PND.   Gastrointestinal:  Positive for heartburn. Negative for abdominal pain, blood in stool, constipation, diarrhea, melena, nausea and vomiting.   Genitourinary:  Negative for dysuria, flank pain, frequency, hematuria and urgency.   Musculoskeletal:  Negative for back pain, falls, joint pain, myalgias and neck pain.   Skin:  Negative for itching and rash.   Neurological:  Negative for dizziness, tingling, tremors, sensory change, speech change, focal weakness, seizures, loss of consciousness, weakness and headaches.   Endo/Heme/Allergies:  Negative for environmental allergies and polydipsia. Does not bruise/bleed easily.   Psychiatric/Behavioral:  Negative for depression, hallucinations, memory loss, substance abuse and suicidal ideas. The patient is not nervous/anxious and does not have insomnia.      Vitals  BP (!) 146/66 (BP Location: Right arm, Patient Position: Sitting, BP Method: Medium (Automatic))   Pulse 78   Temp 97.7 °F (36.5 °C) (Oral)   Resp 20   Ht 5' 5" (1.651 m)   Wt 68.1 kg (150 lb 2.1 oz)   SpO2 99% Comment: Room Air  BMI 24.98 kg/m²   Physical Exam  Vitals and nursing note reviewed.   Constitutional:       General: She is not in acute distress.     Appearance: She is well-developed. She is not diaphoretic.   HENT:      Head: Normocephalic and atraumatic.      Nose: Nose normal. "      Mouth/Throat:      Pharynx: No oropharyngeal exudate.   Eyes:      General: No scleral icterus.     Conjunctiva/sclera: Conjunctivae normal.      Pupils: Pupils are equal, round, and reactive to light.   Neck:      Thyroid: No thyromegaly.      Vascular: No JVD.      Trachea: No tracheal deviation.   Cardiovascular:      Rate and Rhythm: Normal rate and regular rhythm.      Heart sounds: Normal heart sounds. No murmur heard.     No friction rub. No gallop.   Pulmonary:      Effort: Pulmonary effort is normal. No respiratory distress.      Breath sounds: Rales present. No wheezing.      Comments: Diffuse expiratory squeaks  Abdominal:      General: Bowel sounds are normal. There is no distension.      Palpations: Abdomen is soft.      Tenderness: There is no abdominal tenderness.   Musculoskeletal:         General: No deformity. Normal range of motion.      Cervical back: Normal range of motion and neck supple.   Skin:     General: Skin is warm and dry.      Capillary Refill: Capillary refill takes less than 2 seconds.      Coloration: Skin is not pale.      Findings: No erythema or rash.      Comments: Skin tightening.  Digital ulcers and nailbed deformities.  Bilateral foot ulcers in dry intact dressing     Neurological:      Mental Status: She is alert and oriented to person, place, and time.      Cranial Nerves: No cranial nerve deficit.   Psychiatric:         Judgment: Judgment normal.         Labs:          3/4/2024     1:35 PM 9/18/2023    11:06 AM 8/17/2023     1:27 PM 5/23/2023     9:20 AM 10/7/2022    12:56 PM 1/3/2022    10:43 AM 5/21/2021    10:46 AM   Pulmonary Function Tests   FVC 1.81 liters 1.76 liters 1.79 liters 1.9 liters 1.82 liters 1.83 liters 1.85 liters   FEV1 1.43 liters 1.46 liters 1.42 liters 1.58 liters 1.49 liters 1.44 liters 1.48 liters   TLC (liters) 3.28 liters 2.7 liters 3.11 liters 3.41 liters 3.2 liters  3.18 liters   DLCO (ml/mmHg sec) 10.62 ml/mmHg sec 9.15 ml/mmHg sec 10.8  ml/mmHg sec 11.37 ml/mmHg sec 11.89 ml/mmHg sec  14.11 ml/mmHg sec   FVC% 63 66.1 61.8 71.1 67 67 68   FEV1% 64 70.5 63.8 75.6 71 68 69   FEF 25-75 1.32 1.64 1.35 1.82 1.7  1.57   FEF 25-75% 72 93.5 73.2 103.5 95  85   TLC% 64 52.8 60.9 66.8 63  62   RV 1.33 0.94 1.32 1.46 1.33  1.24   RV% 62 44.2 62.2 68.5 63  59   DLCO% 49 42.1 49.7 52.3 54  64         3/4/2024    12:40 PM 9/18/2023    10:57 AM 8/17/2023     1:29 PM 5/22/2023     9:20 AM 2/28/2023    12:42 PM 10/7/2022     9:33 AM 5/3/2022     8:40 AM   6MW   6MWT Status completed without stopping completed without stopping completed without stopping completed without stopping completed without stopping completed without stopping completed without stopping   Patient Reported Other (Comment) Dyspnea Dyspnea No complaints Other (Comment) Dyspnea;Lightheadedness Dyspnea;Leg pain   Was O2 used? No No No No No No No   6MW Distance walked (feet) 1562 feet 1450 feet 1300 feet 1500 feet 1500 feet 1400 feet 1400 feet   Distance walked (meters) 476.1 meters 441.96 meters 396.24 meters 457.2 meters 457.2 meters 426.72 meters 426.72 meters   Did patient stop? No No No No No No No   Oxygen Saturation 100 % 100 % 98 % 100 % 98 % 98 % 99 %   Supplemental Oxygen Room Air Room Air Room Air Room Air Room Air Room Air Room Air   Heart Rate 77 bpm 75 bpm 70 bpm 62 bpm 74 bpm 85 bpm 71 bpm   Blood Pressure 134/88 152/67 141/63 109/51 133/63 126/61 115/54   Ju Dyspnea Rating  light light light moderate moderate moderate moderate   Oxygen Saturation 96 % 98 % 96 % 98 % 94 % 97 % 96 %   Supplemental Oxygen Room Air Room Air Room Air Room Air Room Air Room Air Room Air   Heart Rate 111 bpm 102 bpm 98 bpm 90 bpm 113 bpm 108 bpm 88 bpm   Blood Pressure 156/80 168/71 159/70 136/62 169/76 138/63 138/63   Ju Dyspnea Rating  somewhat heavy moderate somewhat heavy somewhat heavy somewhat heavy somewhat heavy somewhat heavy   Recovery Time (seconds) 121 seconds 123 seconds 100 seconds 131  seconds 160 seconds 39 seconds 130 seconds   Oxygen Saturation 100 % 100 % 100 % 100 % 97 % 97 % 98 %   Supplemental Oxygen Room Air Room Air Room Air Room Air Room Air Room Air Room Air   Heart Rate 84 bpm 90 bpm 82 bpm 71 bpm 84 bpm 103 bpm 83 bpm       Imaging:  Results for orders placed during the hospital encounter of 12/22/20    CT Chest Without Contrast    Narrative  EXAMINATION:  CT CHEST WITHOUT CONTRAST    CLINICAL HISTORY:  pulmonary hypertension; Pulmonary hypertension, unspecified    TECHNIQUE:  Using low dose technique the chest was surveyed from above the pulmonary apices through the posterior costophrenic angles.  Data was reconstructed for multiplanar images in axial, sagittal and coronal planes and for maximal intensity projection images in the axial plane.    All CT scans at this facility use dose modulation, iterative reconstruction and/or weight based dosing when appropriate to reduce radiation dose to as low as reasonably achievable.    Xray dose: DLP = 152.03 mGy-cm.    COMPARISON:  CT chest abdomen without contrast 03/13/2019.  CT chest without contrast 09/18/2015.    FINDINGS:  Base of Neck: No significant abnormality.    Aorta: Left-sided aortic arch with 3 arterial branches. The aorta maintains normal caliber, contour and course. There is mild calcification of the thoracic aorta or coronary arteries.    Heart/pericardium: Normal size.  No pericardial effusion or calcification.  Calcification of the aortic valve annulus.    Pulmonary vasculature: Pulmonary arteries distribute normally.  Pulmonary artery size is neither specific nor sensitive for normal or elevated pulmonary arterial pressures.    -There are four pulmonary veins.    Barbara/Mediastinum: No pathologic noelle enlargement.    Esophagus: The esophagus is mildly dilated with dependent fluid, similar to prior exam.    Upper Abdomen: No abnormality of the partially imaged upper abdomen.    Thoracic soft tissues: Normal. Both breasts  are present.    Bones: No acute fracture. No suspicious lytic or sclerotic lesion.    Airways: Patent.    Lungs/pleura:    -Stable biapical scarring.    -Redemonstration of cystic changes and reticulation plus subsegmental ground-glass disease, most extensive in the lower lung zones.  Radiographic appearance is more typical for NSIP but could represent UIP in this patient with scleroderma.    -There are stable scattered foci of tree-in-bud nodularities, for example within the superior segment of the right lower lobe (axial series 4, image 211).  There is associated dilatation of multiple adjacent small airways.    -0.4 cm solid pulmonary nodule in the apical segment of the right upper lobe (axial series 4, image 83), stable dating back to 2015.    -No pleural fluid or thickening.No pleural calcification. No pneumothorax.    Impression  1.  Overall stable appearing chronic interstitial lung disease consistent with patient's history of scleroderma.  Radiographic appearance is more typical for NSIP and UIP, noting that UIP is a more common disease.    2.  Previously characterized tree-in-bud opacity along the superior aspect of the right oblique fissure appears stable and may reflect chronic inflammatory process.    3.  0.4 cm solid right upper lobe pulmonary nodule, stable dating back to 09/18/2015.  Such stability favors benign etiology.    4.  Persistent fluid-filled esophagus as noted on multiple priors placing the patient at increased risk for aspiration.  This may be related to the patient's history of scleroderma; clinical considerations will determine if further assessment of esophageal motility is warranted.    Electronically signed by resident: Leobardo Arellano  Date:    12/22/2020  Time:    11:43    Electronically signed by: Maxine Del Cid MD  Date:    12/22/2020  Time:    14:23      Cardiodiagnostics:  Results for orders placed during the hospital encounter of 12/29/21    Echo    Interpretation  Summary  · The left ventricle is normal in size with normal systolic function.  · The estimated ejection fraction is 63%.  · Normal left ventricular diastolic function.  · Normal right ventricular size with normal right ventricular systolic function.  · Mild pulmonic regurgitation.  · Normal central venous pressure (3 mmHg).  · The estimated PA systolic pressure is 32 mmHg.  · Trivial posterior pericardial effusion.    Results for orders placed during the hospital encounter of 08/17/23    Echo    Interpretation Summary    Left Ventricle: The left ventricle is normal in size. Normal wall thickness. Normal wall motion. There is normal systolic function with a visually estimated ejection fraction of 60 - 65%. There is normal diastolic function.    Right Ventricle: Normal right ventricular cavity size. Wall thickness is normal. Right ventricle wall motion  is normal. Systolic function is normal.    Pulmonary Artery: The estimated pulmonary artery systolic pressure is 40 mmHg.      Assessment:  1. Scleroderma with pulmonary involvement    2. ILD (interstitial lung disease)    3. WHO group 1 pulmonary arterial hypertension    4. Immunosuppression due to drug therapy      Plan:     Followed for SSc-ILD with PAH. On MMF. No desaturations on 6MWT and distance unchanged. Has known PH which is stable on Adcirca. Has had decline in her FVC/DLCO which could be reflective of previous infection.  Discussed OFEV.  She is agreeable to start.  Will start 100mg BID and monitor LFTs per protocol.      Continue to follow with GI for GERD/scleroderma esophagus. Discussed elevation of the head of her bed to help with nocturnal symptoms. Continue with PPI.    Continue Adcirca and PH follow-up.     Follow-up in 3 months with PFTs.  Refer to pulmonary rehab.       Roberta Leigh D.O.  Pulmonary/Critical Care and Lung Transplantation  [facility] [place]

## 2024-03-07 NOTE — TELEPHONE ENCOUNTER
Refill Decision Note   Yamel Shah  is requesting a refill authorization.  Brief Assessment and Rationale for Refill:  Approve     Medication Therapy Plan:  ED documentation reviewed. No changes to therapy noted. FOVS 3/26/24.      Extended chart review required: Yes   Comments:     Note composed:6:18 PM 03/06/2024

## 2024-03-11 ENCOUNTER — TELEPHONE (OUTPATIENT)
Dept: TRANSPLANT | Facility: CLINIC | Age: 74
End: 2024-03-11
Payer: MEDICARE

## 2024-03-11 ENCOUNTER — SOCIAL WORK (OUTPATIENT)
Dept: TRANSPLANT | Facility: CLINIC | Age: 74
End: 2024-03-11
Payer: MEDICARE

## 2024-03-11 DIAGNOSIS — I27.21 WHO GROUP 1 PULMONARY ARTERIAL HYPERTENSION: ICD-10-CM

## 2024-03-11 DIAGNOSIS — R06.02 SHORTNESS OF BREATH: ICD-10-CM

## 2024-03-11 DIAGNOSIS — M34.9 SCLERODERMA WITH PULMONARY INVOLVEMENT: Primary | ICD-10-CM

## 2024-03-11 RX ORDER — NINTEDANIB 100 MG/1
100 CAPSULE ORAL 2 TIMES DAILY
Qty: 60 CAPSULE | Refills: 11 | Status: SHIPPED | OUTPATIENT
Start: 2024-03-11

## 2024-03-11 NOTE — PROGRESS NOTES
MALINI routed internal pulmonary rehab orders to [facility] program (ph: [phone], fx: [phone]). MALINI in communication w/ REINALDO NAYAK following and remains available.

## 2024-03-11 NOTE — TELEPHONE ENCOUNTER
Per provider note, 3/4/24, patient to start OFEV 100 mg bid. Patient is agreeable and is aware to contact coordinator when she is ready to start medication or any questions.

## 2024-03-11 NOTE — TELEPHONE ENCOUNTER
3/14/24:  Completed PA per Covermymeds Key [id number]. Contacted [facility] [employer] part D spoke with Roberta, she stated this medication has been approved with effective dates 1/1/24 - 3/14/25. Acct. Number [id number].  3/12/24:   Received temporary authorization from [facility] pt. Insurance. 30 days. PA re-iniatied over the phone.  3/11/24:  Attempted to follow up OFEV PA, no answer at PA portal service / [facility]. Unable to view PA electronically through cover my meds.

## 2024-03-15 ENCOUNTER — HOSPITAL ENCOUNTER (OUTPATIENT)
Dept: CARDIOLOGY | Facility: HOSPITAL | Age: 74
Discharge: HOME | End: 2024-03-15
Payer: MEDICARE

## 2024-03-15 ENCOUNTER — PATIENT MESSAGE (OUTPATIENT)
Dept: TRANSPLANT | Facility: CLINIC | Age: 74
End: 2024-03-15
Payer: MEDICARE

## 2024-03-15 VITALS
WEIGHT: 150 LBS | BODY MASS INDEX: 24.99 KG/M2 | DIASTOLIC BLOOD PRESSURE: 60 MMHG | HEART RATE: 83 BPM | HEIGHT: 65 IN | SYSTOLIC BLOOD PRESSURE: 120 MMHG

## 2024-03-15 DIAGNOSIS — I27.20 PULMONARY HYPERTENSION: ICD-10-CM

## 2024-03-15 DIAGNOSIS — Z79.899 HIGH RISK MEDICATION USE: Primary | ICD-10-CM

## 2024-03-15 LAB
ASCENDING AORTA: 2.83 CM
AV INDEX (PROSTH): 0.7
AV MEAN GRADIENT: 7 MMHG
AV PEAK GRADIENT: 13 MMHG
AV VALVE AREA BY VELOCITY RATIO: 2.56 CM²
AV VALVE AREA: 2.43 CM²
AV VELOCITY RATIO: 0.74
BSA FOR ECHO PROCEDURE: 1.77 M2
CV ECHO LV RWT: 0.36 CM
DOP CALC AO PEAK VEL: 1.8 M/S
DOP CALC AO VTI: 37.62 CM
DOP CALC LVOT AREA: 3.5 CM2
DOP CALC LVOT DIAMETER: 2.1 CM
DOP CALC LVOT PEAK VEL: 1.33 M/S
DOP CALC LVOT STROKE VOLUME: 91.39 CM3
DOP CALC RVOT PEAK VEL: 0.95 M/S
DOP CALC RVOT VTI: 23.43 CM
DOP CALCLVOT PEAK VEL VTI: 26.4 CM
E WAVE DECELERATION TIME: 173.81 MSEC
E/A RATIO: 1.41
E/E' RATIO: 10.76 M/S
ECHO LV POSTERIOR WALL: 0.88 CM (ref 0.6–1.1)
FRACTIONAL SHORTENING: 43 % (ref 28–44)
INTERVENTRICULAR SEPTUM: 0.86 CM (ref 0.6–1.1)
LA MAJOR: 5.94 CM
LA MINOR: 5.41 CM
LA WIDTH: 4.15 CM
LEFT ATRIUM SIZE: 3.96 CM
LEFT ATRIUM VOLUME INDEX MOD: 38.9 ML/M2
LEFT ATRIUM VOLUME INDEX: 45.2 ML/M2
LEFT ATRIUM VOLUME MOD: 68.13 CM3
LEFT ATRIUM VOLUME: 79.1 CM3
LEFT INTERNAL DIMENSION IN SYSTOLE: 2.75 CM (ref 2.1–4)
LEFT VENTRICLE DIASTOLIC VOLUME INDEX: 63.26 ML/M2
LEFT VENTRICLE DIASTOLIC VOLUME: 110.71 ML
LEFT VENTRICLE MASS INDEX: 82 G/M2
LEFT VENTRICLE SYSTOLIC VOLUME INDEX: 16.1 ML/M2
LEFT VENTRICLE SYSTOLIC VOLUME: 28.2 ML
LEFT VENTRICULAR INTERNAL DIMENSION IN DIASTOLE: 4.86 CM (ref 3.5–6)
LEFT VENTRICULAR MASS: 144.29 G
LV LATERAL E/E' RATIO: 11.3 M/S
LV SEPTAL E/E' RATIO: 10.27 M/S
MV A" WAVE DURATION": 12.84 MSEC
MV PEAK A VEL: 0.8 M/S
MV PEAK E VEL: 1.13 M/S
MV STENOSIS PRESSURE HALF TIME: 50.41 MS
MV VALVE AREA P 1/2 METHOD: 4.36 CM2
PISA TR MAX VEL: 2.94 M/S
PULM VEIN S/D RATIO: 1.97
PV MEAN GRADIENT: 2 MMHG
PV PEAK D VEL: 0.38 M/S
PV PEAK GRADIENT: 5 MMHG
PV PEAK S VEL: 0.75 M/S
PV PEAK VELOCITY: 1.13 M/S
RA MAJOR: 5.31 CM
RA PRESSURE ESTIMATED: 8 MMHG
RA WIDTH: 3.56 CM
RIGHT VENTRICULAR END-DIASTOLIC DIMENSION: 3.09 CM
RV TB RVSP: 11 MMHG
SINUS: 2.96 CM
STJ: 2.48 CM
TDI LATERAL: 0.1 M/S
TDI SEPTAL: 0.11 M/S
TDI: 0.11 M/S
TR MAX PG: 35 MMHG
TRICUSPID ANNULAR PLANE SYSTOLIC EXCURSION: 2.66 CM
TV REST PULMONARY ARTERY PRESSURE: 43 MMHG
Z-SCORE OF LEFT VENTRICULAR DIMENSION IN END DIASTOLE: 0.04
Z-SCORE OF LEFT VENTRICULAR DIMENSION IN END SYSTOLE: -0.68

## 2024-03-15 PROCEDURE — 93306 TTE W/DOPPLER COMPLETE: CPT | Mod: 26,NTX,, | Performed by: INTERNAL MEDICINE

## 2024-03-15 PROCEDURE — 93306 TTE W/DOPPLER COMPLETE: CPT | Mod: PO,TXP

## 2024-03-19 ENCOUNTER — PATIENT MESSAGE (OUTPATIENT)
Dept: TRANSPLANT | Facility: CLINIC | Age: 74
End: 2024-03-19
Payer: MEDICARE

## 2024-03-25 DIAGNOSIS — M34.9 SCLERODERMA: ICD-10-CM

## 2024-03-25 DIAGNOSIS — E78.5 HYPERLIPIDEMIA: ICD-10-CM

## 2024-03-25 NOTE — TELEPHONE ENCOUNTER
No care due was identified.  [facility] Embedded Care Due Messages. Reference number: [id number].   3/25/2024 8:51:31 AM CDT

## 2024-03-26 ENCOUNTER — OFFICE VISIT (OUTPATIENT)
Dept: INTERNAL MEDICINE | Facility: CLINIC | Age: 74
End: 2024-03-26
Payer: MEDICARE

## 2024-03-26 VITALS
TEMPERATURE: 98 F | HEIGHT: 65 IN | SYSTOLIC BLOOD PRESSURE: 124 MMHG | HEART RATE: 68 BPM | DIASTOLIC BLOOD PRESSURE: 60 MMHG | BODY MASS INDEX: 24.61 KG/M2 | WEIGHT: 147.69 LBS | RESPIRATION RATE: 16 BRPM

## 2024-03-26 DIAGNOSIS — M34.9 SCLERODERMA WITH PULMONARY INVOLVEMENT: ICD-10-CM

## 2024-03-26 DIAGNOSIS — Z79.899 IMMUNOSUPPRESSION DUE TO DRUG THERAPY: ICD-10-CM

## 2024-03-26 DIAGNOSIS — I10 ESSENTIAL HYPERTENSION: Primary | ICD-10-CM

## 2024-03-26 DIAGNOSIS — D84.821 IMMUNOSUPPRESSION DUE TO DRUG THERAPY: ICD-10-CM

## 2024-03-26 DIAGNOSIS — L97.519 SKIN ULCERS OF BOTH FEET: ICD-10-CM

## 2024-03-26 DIAGNOSIS — J84.9 ILD (INTERSTITIAL LUNG DISEASE): ICD-10-CM

## 2024-03-26 DIAGNOSIS — L97.529 SKIN ULCERS OF BOTH FEET: ICD-10-CM

## 2024-03-26 PROCEDURE — 99214 OFFICE O/P EST MOD 30 MIN: CPT | Mod: S$PBB,,, | Performed by: INTERNAL MEDICINE

## 2024-03-26 PROCEDURE — 99214 OFFICE O/P EST MOD 30 MIN: CPT | Mod: PBBFAC,PO | Performed by: INTERNAL MEDICINE

## 2024-03-26 PROCEDURE — 99999 PR PBB SHADOW E&M-EST. PATIENT-LVL IV: CPT | Mod: PBBFAC,,, | Performed by: INTERNAL MEDICINE

## 2024-03-26 RX ORDER — GABAPENTIN 300 MG/1
300 CAPSULE ORAL 2 TIMES DAILY
COMMUNITY

## 2024-03-26 RX ORDER — PRAVASTATIN SODIUM 20 MG/1
20 TABLET ORAL NIGHTLY
Qty: 90 TABLET | Refills: 0 | Status: SHIPPED | OUTPATIENT
Start: 2024-03-26 | End: 2024-06-03 | Stop reason: SDUPTHER

## 2024-03-26 RX ORDER — ACETAMINOPHEN AND CODEINE PHOSPHATE 300; 60 MG/1; MG/1
1 TABLET ORAL EVERY 4 HOURS PRN
COMMUNITY

## 2024-04-01 ENCOUNTER — TELEPHONE (OUTPATIENT)
Dept: TRANSPLANT | Facility: CLINIC | Age: 74
End: 2024-04-01
Payer: MEDICARE

## 2024-04-01 NOTE — TELEPHONE ENCOUNTER
Spoke with patient, she is aware of Dr. Leigh's appt.    ----- Message from Brittany Pelaez sent at 4/1/2024  1:29 PM CDT -----  Regarding: return call  Contact: Yamel  Caller:Yamel Shah          Returning call to: Johanna        Caller can be reached @: [phone] (Mobile, requesting a call back.

## 2024-04-01 NOTE — TELEPHONE ENCOUNTER
Patient requesting appointment with provider, reports she is not feeling well after seeing PCP, being on azithromycin and delsym. Request sent to provider.   She reports: Was in [place] a few weeks ago (went to urgent care, [place] got azithromycin, prednisone, delsym. Back to [place], saw Dr. Rand and seemed better. Reports she is producing yellow / green sputum. Using nebulizer. No fever, still congested, coughing, seems to not be getting better.  She said it seems like what ever Dr. Leigh usually prescribes seems to work better.    Note patient has not started OFEV yet, she is contacting the pharmacy to obtain the medication. She is aware PA approved.

## 2024-04-02 ENCOUNTER — OFFICE VISIT (OUTPATIENT)
Dept: TRANSPLANT | Facility: CLINIC | Age: 74
End: 2024-04-02
Payer: MEDICARE

## 2024-04-02 ENCOUNTER — TELEPHONE (OUTPATIENT)
Dept: TRANSPLANT | Facility: CLINIC | Age: 74
End: 2024-04-02

## 2024-04-02 ENCOUNTER — TELEPHONE (OUTPATIENT)
Dept: RHEUMATOLOGY | Facility: CLINIC | Age: 74
End: 2024-04-02
Payer: MEDICARE

## 2024-04-02 VITALS
TEMPERATURE: 99 F | OXYGEN SATURATION: 98 % | SYSTOLIC BLOOD PRESSURE: 139 MMHG | WEIGHT: 143.94 LBS | DIASTOLIC BLOOD PRESSURE: 60 MMHG | HEART RATE: 80 BPM | BODY MASS INDEX: 23.98 KG/M2 | HEIGHT: 65 IN

## 2024-04-02 DIAGNOSIS — J22 LRTI (LOWER RESPIRATORY TRACT INFECTION): ICD-10-CM

## 2024-04-02 DIAGNOSIS — M34.9 SCLERODERMA WITH PULMONARY INVOLVEMENT: ICD-10-CM

## 2024-04-02 DIAGNOSIS — M34.9 SCLERODERMA WITH PULMONARY INVOLVEMENT: Primary | ICD-10-CM

## 2024-04-02 DIAGNOSIS — J84.9 ILD (INTERSTITIAL LUNG DISEASE): Primary | ICD-10-CM

## 2024-04-02 DIAGNOSIS — I27.21 WHO GROUP 1 PULMONARY ARTERIAL HYPERTENSION: ICD-10-CM

## 2024-04-02 DIAGNOSIS — R13.10 DYSPHAGIA, UNSPECIFIED TYPE: ICD-10-CM

## 2024-04-02 PROCEDURE — 87070 CULTURE OTHR SPECIMN AEROBIC: CPT | Mod: NTX | Performed by: PHYSICIAN ASSISTANT

## 2024-04-02 PROCEDURE — 99999 PR PBB SHADOW E&M-EST. PATIENT-LVL V: CPT | Mod: PBBFAC,TXP,, | Performed by: PHYSICIAN ASSISTANT

## 2024-04-02 PROCEDURE — 99214 OFFICE O/P EST MOD 30 MIN: CPT | Mod: 25,S$PBB,NTX, | Performed by: PHYSICIAN ASSISTANT

## 2024-04-02 PROCEDURE — 99215 OFFICE O/P EST HI 40 MIN: CPT | Mod: PBBFAC,TXP | Performed by: PHYSICIAN ASSISTANT

## 2024-04-02 PROCEDURE — 87205 SMEAR GRAM STAIN: CPT | Mod: TXP | Performed by: PHYSICIAN ASSISTANT

## 2024-04-02 RX ORDER — LEVOFLOXACIN 750 MG/1
750 TABLET ORAL DAILY
Qty: 7 TABLET | Refills: 0 | Status: SHIPPED | OUTPATIENT
Start: 2024-04-02 | End: 2024-04-29 | Stop reason: ALTCHOICE

## 2024-04-02 NOTE — PROGRESS NOTES
ADVANCED LUNG DISEASE CLINIC FOLLOW-UP                                                                                                                                             Reason for Visit:  SSc-ILD    Referring Physician: No ref. provider found    History of Present Illness: Yamel Shah is a 73 y.o. female who is on 0L of oxygen.  She is on no assisted ventilation.  Her New York Heart Association Class is II and a Karnofsky score of 90% - Able to carry on normal activity: minor symptoms of disease. She is not diabetic.    She presents today for sick visit. History of recurrent LRTI over the past 6 months. She started feeling ill around 3/11. She traveled to [place], [place], to visit family and developed sore throat, productive cough with yellow sputum production, and dyspnea worse than her baseline. She went to a local urgent care and was prescribed a prednisone burst and zpak. She states her symptoms did not really improve.     Today, she reports ongoing dyspnea and cough with yellow sputum production. Cough is worse at nighttime. History of significant reflux despite PPI therapy. Denies fevers, chills, myalgias, sinus congestion, post-nasal drip, n/v/d/c. She states she has held her MMF since early march due to infectious symptoms. She is awaiting OFEV.       Past Medical History:   Diagnosis Date    Abnormal Pap smear     Acid reflux     Allergy     Arthritis     Encounter for blood transfusion     GERD (gastroesophageal reflux disease)     Hiatal hernia 03/24/2023    History of migraine headaches     Hx of colonic polyp     Hypertension     Idiopathic neuropathy 07/20/2012    ILD (interstitial lung disease) 11/06/2013    Iron deficiency anemia 03/18/2014    MRSA carrier     Osteopenia     Pneumonia     Pulmonary fibrosis     Pulmonary hypertension     Raynaud's disease     Scleroderma, diffuse     Sjogren's syndrome     Vitamin D deficiency 11/14/2013       Past Surgical History:   Procedure  Laterality Date    24 HOUR IMPEDANCE PH MONITORING OF ESOPHAGUS IN PATIENT NOT TAKING ACID REDUCING MEDICATIONS N/A 03/04/2020    Procedure: IMPEDANCE PH STUDY, ESOPHAGEAL, 24 HOUR, IN PATIENT NOT TAKING ACID REDUCING MEDICATION;  Surgeon: Annamaria Mendoza MD;  Location: [facility] (4TH FLR);  Service: Endoscopy;  Laterality: N/A;  OFF PPI/H2 Blocker   Motility Studies   Hold Narcotics x 1 days   Hold TCA x 1 days  2/26 - LVM attempting to confirm appt  2/27 - Confirmed appt    ANORECTAL MANOMETRY N/A 05/10/2021    Procedure: MANOMETRY, ANORECTAL with balloon expulsion test;  Surgeon: Annamaria Mendoza MD;  Location: [facility] ENDO (4TH FLR);  Service: Endoscopy;  Laterality: N/A;  order combined  covid test 5/7 [facility], instructions emailed-[facility]    BREAST BIOPSY      Left, benign    BRONCHOSCOPY N/A 10/2/2023    Procedure: bronch;  Surgeon: Roberta Leigh DO;  Location: [facility] OR 2ND FLR;  Service: Endoscopy;  Laterality: N/A;    CERVICAL CONIZATION   W/ LASER  1970    COLONOSCOPY      COLONOSCOPY N/A 03/29/2019    Procedure: COLONOSCOPY;  Surgeon: Annamaria Mendoza MD;  Location: [facility] (2ND FLR);  Service: Endoscopy;  Laterality: N/A;    COLONOSCOPY N/A 05/10/2021    Procedure: COLONOSCOPY;  Surgeon: Annamaria Mendoza MD;  Location: [facility] (4TH FLR);  Service: Endoscopy;  Laterality: N/A;  2nd floor-previous scopes done on 2nd floor, gastroparesis  full liquid diet x2 days, clear liquid x1 day prior to procedure  covid test 5/7 [facility], instructions emailed-[facility]  5/6 pt confirmed [id number]    DILATION AND CURETTAGE OF UTERUS      ESOPHAGEAL MANOMETRY WITH MEASUREMENT OF IMPEDANCE N/A 03/04/2020    Procedure: MANOMETRY, ESOPHAGUS, WITH IMPEDANCE MEASUREMENT;  Surgeon: Annamaria Mendoza MD;  Location: [facility] (4TH FLR);  Service: Endoscopy;  Laterality: N/A;  OFF PPI/H2 Blocker   Motility Studies   Hold Narcotics x 1 days   Hold TCA x 1 days    ESOPHAGOGASTRODUODENOSCOPY      ESOPHAGOGASTRODUODENOSCOPY N/A  03/29/2019    Procedure: EGD (ESOPHAGOGASTRODUODENOSCOPY);  Surgeon: Annamaria Mendoza MD;  Location: [facility] ENDO (2ND FLR);  Service: Endoscopy;  Laterality: N/A;  pulmonary htn    ESOPHAGOGASTRODUODENOSCOPY N/A 02/02/2021    Procedure: ESOPHAGOGASTRODUODENOSCOPY (EGD);  Surgeon: Annamaria Mendoza MD;  Location: [facility] ENDO (2ND FLR);  Service: Endoscopy;  Laterality: N/A;  2nd floor gastroparesis  3 days full liquid diet and 1 day clears  covid test 1/30 primary care, instructions sent to [facility]    ESOPHAGOGASTRODUODENOSCOPY N/A 07/01/2022    Procedure: ESOPHAGOGASTRODUODENOSCOPY (EGD);  Surgeon: Annamaria Mendoza MD;  Location: [facility] ENDO (2ND FLR);  Service: Endoscopy;  Laterality: N/A;  2nd floor-gastroparesis  full liquid diet x3 days, clear liquid diet x1 day prior to procedure  fully vaccinated, instructions sent to [facility]  6/29-pt confirmed new arrival time-[facility]    EXCISION, LESION, STOMACH, LAPAROSCOPIC N/A 03/23/2023    Procedure: XI ROBOTIC EXCISION, LESION, STOMACH;  Surgeon: Katherine Segura MD;  Location: [address];  Service: General;  Laterality: N/A;    EXCISIONAL BIOPSY, LYMPH NODE Right 05/26/2023    Procedure: EXCISIONAL BIOPSY, LYMPH NODE, INGUINAL;  Surgeon: Katherine Segura MD;  [facility];  Service: General;  Laterality: Right;    HYSTERECTOMY  1990    FRANDY (AUB, Fibroids), ovaries remain    REPAIR, HERNIA, UMBILICAL N/A 03/23/2023    Procedure: REPAIR, HERNIA, UMBILICAL;  Surgeon: Katherine Segura MD;  Location: [facility] OR [address] [place] [address];  Service: General;  Laterality: N/A;    RIGHT HEART CATHETERIZATION Right 01/05/2021    Procedure: INSERTION, CATHETER, RIGHT HEART;  Surgeon: Aureliano Sy MD;  Location: [facility] CATH LAB;  Service: Cardiology;  Laterality: Right;    RIGHT HEART CATHETERIZATION Right 03/16/2023    Procedure: INSERTION, CATHETER, RIGHT HEART;  Surgeon: Aureliano Sy MD;  Location: [facility] CATH LAB;  Service:  Cardiology;  Laterality: Right;    ROBOT-ASSISTED LAPAROSCOPIC REPAIR OF INGUINAL HERNIA USING DA VERNELL XI Right 05/26/2023    Procedure: XI ROBOTIC REPAIR, HERNIA, INGUINAL, FEMORAL;  Surgeon: Katherine Segura MD;  Location: [facility] OR 2ND FLR;  Service: General;  Laterality: Right;    ROBOT-ASSISTED REPAIR OF HIATAL HERNIA USING DA VERNELL XI N/A 03/23/2023    Procedure: XI ROBOTIC REPAIR, HERNIA, HIATAL;  Surgeon: Katherine Segura MD;  Location: [facility] OR 2ND FLR;  Service: General;  Laterality: N/A;    VARICOSE VEIN SURGERY      XI ROBOTIC GASTROPEXY N/A 03/23/2023    Procedure: XI ROBOTIC GASTROPEXY;  Surgeon: Katherine Segura MD;  Location: [facility] OR 2ND FLR;  Service: General;  Laterality: N/A;    XI ROBOTIC PYLOROMYOTOMY N/A 03/23/2023    Procedure: XI ROBOTIC PYLOROMYOTOMY;  Surgeon: Katherine Segura MD;  Location: [facility] OR 2ND FLR;  Service: General;  Laterality: N/A;       Allergies: Spironolactone, Sulfa (sulfonamide antibiotics), and Tramadol    Current Outpatient Medications   Medication Sig    acetaminophen-codeine 300-60mg (TYLENOL #4) 300-60 mg Tab Take 1 tablet by mouth every 4 (four) hours as needed.    albuterol (PROVENTIL) 2.5 mg /3 mL (0.083 %) nebulizer solution Take 3 mLs (2.5 mg total) by nebulization every 4 (four) hours as needed for Wheezing or Shortness of Breath (or cough). Rescue    albuterol (VENTOLIN HFA) 90 mcg/actuation inhaler Inhale 2 puffs into the lungs every 4 (four) hours as needed for Wheezing. Rescue    albuterol (VENTOLIN HFA) 90 mcg/actuation inhaler Inhale 2 puffs into the lungs every 6 (six) hours as needed for Wheezing. Rescue    albuterol sulfate 2.5 mg/0.5 mL Nebu Take 2.5 mg by nebulization every 4 (four) hours as needed (shortness of breath/wheezing). Rescue    carboxymethylcellulose sodium (REFRESH TEARS) 0.5 % Drop Apply 1-2 drops to every as needed (Patient not taking: Reported on 3/26/2024)    ergocalciferol (ERGOCALCIFEROL) 50,000  unit Cap TAKE 1 CAPSULE BY MOUTH EVERY 7 DAYS    furosemide (LASIX) 20 MG tablet Take 0.5 tablets (10 mg total) by mouth once daily.    gabapentin (NEURONTIN) 300 MG capsule Take 300 mg by mouth 2 (two) times daily.    levoFLOXacin (LEVAQUIN) 750 MG tablet Take 1 tablet (750 mg total) by mouth once daily.    multivitamin capsule Take 1 capsule by mouth once daily.    mycophenolate mofetil (CELLCEPT) 200 mg/mL SusR Take 5 mLs (1,000 mg total) by mouth 2 (two) times daily.    nabumetone (RELAFEN) 750 MG tablet Take 1 tablet (750 mg total) by mouth daily as needed for Pain.    nebulizer and compressor Rosemary Use as directed    NIFEdipine (ADALAT CC) 90 MG TbSR Take 1 tablet (90 mg total) by mouth nightly. TAKE 1 TABLET(90 MG) BY MOUTH EVERY DAY (TAKES NIGHTLY WITH 30 MG TABLET TOTAL 120 MG)    NIFEdipine (PROCARDIA-XL) 30 MG (OSM) 24 hr tablet TAKE 1 TABLET(30 MG) BY MOUTH EVERY DAY    nintedanib (OFEV) 100 mg Cap Take 100 mg by mouth 2 (two) times a day.    pravastatin (PRAVACHOL) 20 MG tablet TAKE 1 TABLET(20 MG) BY MOUTH EVERY EVENING    pregabalin (LYRICA) 300 MG Cap Take 1 capsule (300 mg total) by mouth 2 (two) times daily.    PREVIDENT 5000 BOOSTER PLUS 1.1 % Pste SMARTSIG:To Teeth    PREVIDENT 5000 SENSITIVE 1.1-5 % Pste BRUSH FOR 2 MINUTES TWICE PER DAY    RABEprazole (ACIPHEX) 20 mg tablet Take 1 tablet (20 mg total) by mouth 2 (two) times daily.    tadalafil (ADCIRCA) 20 mg Tab Take 2 tablets (40 mg total) by mouth once daily. (Patient taking differently: Take 40 mg by mouth every evening.)    tiZANidine (ZANAFLEX) 4 MG tablet Take 2 tablets (8 mg total) by mouth nightly as needed (muscle pain).     No current facility-administered medications for this visit.     Facility-Administered Medications Ordered in Other Visits   Medication    fentaNYL 50 mcg/mL injection 25 mcg    haloperidol lactate injection 0.5 mg    sodium chloride 0.9% flush 10 mL       Immunization History   Administered Date(s) Administered     COVID-19 MRNA, LN-S PF (MODERNA HALF 0.25 ML DOSE) 04/21/2022    COVID-19, MRNA, LN-S, PF (MODERNA FULL 0.5 ML DOSE) 02/09/2021, 03/12/2021, 09/21/2021    COVID-19, MRNA, LN-S, PF (Pfizer) (Purple Cap) 10/13/2022    COVID-19, mRNA, LNP-S, PF (Moderna 2023)Ages 12+ 10/18/2023    COVID-19, mRNA, LNP-S, bivalent booster, PF (Moderna Omicron)12 + YEARS 10/13/2022, 10/13/2022    Influenza 11/02/2015, 09/21/2021, 09/22/2023    Influenza (FLUAD) - Quadrivalent - Adjuvanted - PF *Preferred* (65+) 09/11/2020, 09/12/2022, 09/22/2023    Influenza - High Dose - PF (65 years and older) 10/09/2017, 10/11/2017, 10/27/2018, 09/09/2019    Influenza - Quadrivalent - PF *Preferred* (6 months and older) 11/03/2006, 10/08/2007, 09/29/2009, 09/26/2014, 09/26/2014, 11/02/2015, 11/02/2015, 09/27/2016    Influenza - Trivalent (ADULT) 11/03/2006, 10/08/2007, 09/29/2009, 09/26/2014    Influenza - Trivalent - PF (ADULT) 11/02/2015    Influenza Split 11/03/2006, 10/08/2007, 09/29/2009, 09/26/2014    Pneumococcal Conjugate - 13 Valent 01/16/2013, 01/16/2013    Pneumococcal Polysaccharide - 23 Valent 09/27/2016, 11/12/2021    RSVpreF (Arexvy) 10/27/2023    Tdap 02/06/2019    Zoster 01/16/2013    Zoster Recombinant 11/30/2018, 02/12/2019     Family History:    Family History   Problem Relation Age of Onset    Breast cancer Mother     Cancer Mother         Breast    Hypertension Father     Diabetes Father         Amputation of legs    Breast cancer Sister     Diabetes Sister     Arthritis Sister     Cancer Sister         Breast    Osteoarthritis Brother     Diabetes Brother     Arthritis Brother     Birth defects Brother         Polio at birth, recently had knee replacement surgery on left knee    No Known Problems Daughter     No Known Problems Daughter     No Known Problems Son     No Known Problems Son     Breast cancer Maternal Aunt     Cancer Maternal Aunt         Breast    Early death Sister     Melanoma Neg Hx     Colon cancer Neg Hx      Crohn's disease Neg Hx     Stomach cancer Neg Hx     Ulcerative colitis Neg Hx     Rectal cancer Neg Hx     Irritable bowel syndrome Neg Hx     Esophageal cancer Neg Hx     Celiac disease Neg Hx     Ovarian cancer Neg Hx     Liver cancer Neg Hx     Pancreatic cancer Neg Hx      Social History     Substance and Sexual Activity   Alcohol Use Yes    Comment: wine occasionally      Social History     Substance and Sexual Activity   Drug Use No      Social History     Socioeconomic History    Marital status:      Spouse name: Lewis    Number of children: 4   Occupational History    Occupation: -retired     Employer: [employer]     Comment: [employer]     Employer: RETIRED   Tobacco Use    Smoking status: Never     Passive exposure: Never    Smokeless tobacco: Never   Substance and Sexual Activity    Alcohol use: Yes     Comment: wine occasionally    Drug use: No    Sexual activity: Yes     Partners: Male     Birth control/protection: Post-menopausal, None, See Surgical Hx     Comment: Hysterectomy   Other Topics Concern    Are you pregnant or think you may be? No    Breast-feeding No   Social History Narrative         Social Determinants of Health     Financial Resource Strain: Low Risk  (3/26/2024)    Overall Financial Resource Strain (CARDIA)     Difficulty of Paying Living Expenses: Not hard at all   Food Insecurity: No Food Insecurity (3/26/2024)    Hunger Vital Sign     Worried About Running Out of Food in the Last Year: Never true     Ran Out of Food in the Last Year: Never true   Transportation Needs: No Transportation Needs (3/26/2024)    PRAPARE - Transportation     Lack of Transportation (Medical): No     Lack of Transportation (Non-Medical): No   Physical Activity: Insufficiently Active (3/26/2024)    Exercise Vital Sign     Days of Exercise per Week: 1 day     Minutes of Exercise per Session: 10 min   Stress: No Stress Concern Present (3/26/2024)    [facility]  [facility] - Occupational Stress Questionnaire     Feeling of Stress : Only a little   Social Connections: Unknown (3/26/2024)    Social Connection and Isolation Panel [NHANES]     Frequency of Communication with Friends and Family: Three times a week     Frequency of Social Gatherings with Friends and Family: Patient declined     Active Member of Clubs or Organizations: No     Attends Club or Organization Meetings: Never     Marital Status:    Housing Stability: Low Risk  (3/26/2024)    Housing Stability Vital Sign     Unable to Pay for Housing in the Last Year: No     Number of Places Lived in the Last Year: 1     Unstable Housing in the Last Year: No     Review of Systems   Constitutional:  Negative for chills, diaphoresis, fever, malaise/fatigue and weight loss.   HENT:  Negative for congestion, ear discharge, ear pain, hearing loss, nosebleeds, sinus pain, sore throat and tinnitus.    Eyes:  Negative for blurred vision, double vision, photophobia, pain, discharge and redness.   Respiratory:  Positive for cough and shortness of breath (heavy exertion only). Negative for hemoptysis, sputum production, wheezing and stridor.    Cardiovascular:  Negative for chest pain, palpitations, orthopnea, claudication, leg swelling and PND.   Gastrointestinal:  Positive for heartburn. Negative for abdominal pain, blood in stool, constipation, diarrhea, melena, nausea and vomiting.   Genitourinary:  Negative for dysuria, flank pain, frequency, hematuria and urgency.   Musculoskeletal:  Negative for back pain, falls, joint pain, myalgias and neck pain.   Skin:  Negative for itching and rash.   Neurological:  Negative for dizziness, tingling, tremors, sensory change, speech change, focal weakness, seizures, loss of consciousness, weakness and headaches.   Endo/Heme/Allergies:  Negative for environmental allergies and polydipsia. Does not bruise/bleed easily.   Psychiatric/Behavioral:  Negative for depression,  "hallucinations, memory loss, substance abuse and suicidal ideas. The patient is not nervous/anxious and does not have insomnia.      Vitals  /60 (BP Location: Left arm, Patient Position: Sitting, BP Method: Medium (Automatic))   Pulse 80   Temp 98.7 °F (37.1 °C) (Oral)   Ht 5' 5" (1.651 m)   Wt 65.3 kg (143 lb 15.4 oz)   SpO2 98% Comment: room air  BMI 23.96 kg/m²   Physical Exam  Vitals and nursing note reviewed.   Constitutional:       General: She is not in acute distress.     Appearance: She is well-developed. She is not diaphoretic.   HENT:      Head: Normocephalic and atraumatic.      Nose: Nose normal.      Mouth/Throat:      Pharynx: No oropharyngeal exudate.   Eyes:      General: No scleral icterus.     Conjunctiva/sclera: Conjunctivae normal.      Pupils: Pupils are equal, round, and reactive to light.   Neck:      Thyroid: No thyromegaly.      Vascular: No JVD.      Trachea: No tracheal deviation.   Cardiovascular:      Rate and Rhythm: Normal rate and regular rhythm.      Heart sounds: Normal heart sounds. No murmur heard.     No friction rub. No gallop.   Pulmonary:      Effort: Pulmonary effort is normal. No respiratory distress.      Breath sounds: Wheezing and rales present.      Comments: Diffuse expiratory squeaks  Abdominal:      General: Bowel sounds are normal. There is no distension.      Palpations: Abdomen is soft.      Tenderness: There is no abdominal tenderness.   Musculoskeletal:         General: No deformity. Normal range of motion.      Cervical back: Normal range of motion and neck supple.   Skin:     General: Skin is warm and dry.      Capillary Refill: Capillary refill takes less than 2 seconds.      Coloration: Skin is not pale.      Findings: No erythema or rash.      Comments: Skin tightening.  Digital ulcers and nailbed deformities.  Bilateral foot ulcers in dry intact dressing     Neurological:      Mental Status: She is alert and oriented to person, place, and time. "      Cranial Nerves: No cranial nerve deficit.   Psychiatric:         Judgment: Judgment normal.         Labs:          3/4/2024     1:35 PM 9/18/2023    11:06 AM 8/17/2023     1:27 PM 5/23/2023     9:20 AM 10/7/2022    12:56 PM 1/3/2022    10:43 AM 5/21/2021    10:46 AM   Pulmonary Function Tests   FVC 1.81 liters 1.76 liters 1.79 liters 1.9 liters 1.82 liters 1.83 liters 1.85 liters   FEV1 1.43 liters 1.46 liters 1.42 liters 1.58 liters 1.49 liters 1.44 liters 1.48 liters   TLC (liters) 3.28 liters 2.7 liters 3.11 liters 3.41 liters 3.2 liters  3.18 liters   DLCO (ml/mmHg sec) 10.62 ml/mmHg sec 9.15 ml/mmHg sec 10.8 ml/mmHg sec 11.37 ml/mmHg sec 11.89 ml/mmHg sec  14.11 ml/mmHg sec   FVC% 63 66.1 61.8 71.1 67 67 68   FEV1% 64 70.5 63.8 75.6 71 68 69   FEF 25-75 1.32 1.64 1.35 1.82 1.7  1.57   FEF 25-75% 72 93.5 73.2 103.5 95  85   TLC% 64 52.8 60.9 66.8 63  62   RV 1.33 0.94 1.32 1.46 1.33  1.24   RV% 62 44.2 62.2 68.5 63  59   DLCO% 49 42.1 49.7 52.3 54  64         3/4/2024    12:40 PM 9/18/2023    10:57 AM 8/17/2023     1:29 PM 5/22/2023     9:20 AM 2/28/2023    12:42 PM 10/7/2022     9:33 AM 5/3/2022     8:40 AM   6MW   6MWT Status completed without stopping completed without stopping completed without stopping completed without stopping completed without stopping completed without stopping completed without stopping   Patient Reported Other (Comment) Dyspnea Dyspnea No complaints Other (Comment) Dyspnea;Lightheadedness Dyspnea;Leg pain   Was O2 used? No No No No No No No   6MW Distance walked (feet) 1562 feet 1450 feet 1300 feet 1500 feet 1500 feet 1400 feet 1400 feet   Distance walked (meters) 476.1 meters 441.96 meters 396.24 meters 457.2 meters 457.2 meters 426.72 meters 426.72 meters   Did patient stop? No No No No No No No   Oxygen Saturation 100 % 100 % 98 % 100 % 98 % 98 % 99 %   Supplemental Oxygen Room Air Room Air Room Air Room Air Room Air Room Air Room Air   Heart Rate 77 bpm 75 bpm 70 bpm 62 bpm  74 bpm 85 bpm 71 bpm   Blood Pressure 134/88 152/67 141/63 109/51 133/63 126/61 115/54   Ju Dyspnea Rating  light light light moderate moderate moderate moderate   Oxygen Saturation 96 % 98 % 96 % 98 % 94 % 97 % 96 %   Supplemental Oxygen Room Air Room Air Room Air Room Air Room Air Room Air Room Air   Heart Rate 111 bpm 102 bpm 98 bpm 90 bpm 113 bpm 108 bpm 88 bpm   Blood Pressure 156/80 168/71 159/70 136/62 169/76 138/63 138/63   Ju Dyspnea Rating  somewhat heavy moderate somewhat heavy somewhat heavy somewhat heavy somewhat heavy somewhat heavy   Recovery Time (seconds) 121 seconds 123 seconds 100 seconds 131 seconds 160 seconds 39 seconds 130 seconds   Oxygen Saturation 100 % 100 % 100 % 100 % 97 % 97 % 98 %   Supplemental Oxygen Room Air Room Air Room Air Room Air Room Air Room Air Room Air   Heart Rate 84 bpm 90 bpm 82 bpm 71 bpm 84 bpm 103 bpm 83 bpm       Imaging:  Results for orders placed during the hospital encounter of 12/22/20    CT Chest Without Contrast    Narrative  EXAMINATION:  CT CHEST WITHOUT CONTRAST    CLINICAL HISTORY:  pulmonary hypertension; Pulmonary hypertension, unspecified    TECHNIQUE:  Using low dose technique the chest was surveyed from above the pulmonary apices through the posterior costophrenic angles.  Data was reconstructed for multiplanar images in axial, sagittal and coronal planes and for maximal intensity projection images in the axial plane.    All CT scans at this facility use dose modulation, iterative reconstruction and/or weight based dosing when appropriate to reduce radiation dose to as low as reasonably achievable.    Xray dose: DLP = 152.03 mGy-cm.    COMPARISON:  CT chest abdomen without contrast 03/13/2019.  CT chest without contrast 09/18/2015.    FINDINGS:  Base of Neck: No significant abnormality.    Aorta: Left-sided aortic arch with 3 arterial branches. The aorta maintains normal caliber, contour and course. There is mild calcification of the thoracic  aorta or coronary arteries.    Heart/pericardium: Normal size.  No pericardial effusion or calcification.  Calcification of the aortic valve annulus.    Pulmonary vasculature: Pulmonary arteries distribute normally.  Pulmonary artery size is neither specific nor sensitive for normal or elevated pulmonary arterial pressures.    -There are four pulmonary veins.    Barbara/Mediastinum: No pathologic noelle enlargement.    Esophagus: The esophagus is mildly dilated with dependent fluid, similar to prior exam.    Upper Abdomen: No abnormality of the partially imaged upper abdomen.    Thoracic soft tissues: Normal. Both breasts are present.    Bones: No acute fracture. No suspicious lytic or sclerotic lesion.    Airways: Patent.    Lungs/pleura:    -Stable biapical scarring.    -Redemonstration of cystic changes and reticulation plus subsegmental ground-glass disease, most extensive in the lower lung zones.  Radiographic appearance is more typical for NSIP but could represent UIP in this patient with scleroderma.    -There are stable scattered foci of tree-in-bud nodularities, for example within the superior segment of the right lower lobe (axial series 4, image 211).  There is associated dilatation of multiple adjacent small airways.    -0.4 cm solid pulmonary nodule in the apical segment of the right upper lobe (axial series 4, image 83), stable dating back to 2015.    -No pleural fluid or thickening.No pleural calcification. No pneumothorax.    Impression  1.  Overall stable appearing chronic interstitial lung disease consistent with patient's history of scleroderma.  Radiographic appearance is more typical for NSIP and UIP, noting that UIP is a more common disease.    2.  Previously characterized tree-in-bud opacity along the superior aspect of the right oblique fissure appears stable and may reflect chronic inflammatory process.    3.  0.4 cm solid right upper lobe pulmonary nodule, stable dating back to 09/18/2015.   Such stability favors benign etiology.    4.  Persistent fluid-filled esophagus as noted on multiple priors placing the patient at increased risk for aspiration.  This may be related to the patient's history of scleroderma; clinical considerations will determine if further assessment of esophageal motility is warranted.    Electronically signed by resident: Leobardo Arellano  Date:    12/22/2020  Time:    11:43    Electronically signed by: Maxine Del Cid MD  Date:    12/22/2020  Time:    14:23      Cardiodiagnostics:  Results for orders placed during the hospital encounter of 12/29/21    Echo    Interpretation Summary  · The left ventricle is normal in size with normal systolic function.  · The estimated ejection fraction is 63%.  · Normal left ventricular diastolic function.  · Normal right ventricular size with normal right ventricular systolic function.  · Mild pulmonic regurgitation.  · Normal central venous pressure (3 mmHg).  · The estimated PA systolic pressure is 32 mmHg.  · Trivial posterior pericardial effusion.    Results for orders placed during the hospital encounter of 08/17/23    Echo    Interpretation Summary    Left Ventricle: The left ventricle is normal in size. Normal wall thickness. Normal wall motion. There is normal systolic function with a visually estimated ejection fraction of 60 - 65%. There is normal diastolic function.    Right Ventricle: Normal right ventricular cavity size. Wall thickness is normal. Right ventricle wall motion  is normal. Systolic function is normal.    Pulmonary Artery: The estimated pulmonary artery systolic pressure is 40 mmHg.      Assessment:  1. ILD (interstitial lung disease)    2. Scleroderma with pulmonary involvement    3. WHO group 1 pulmonary arterial hypertension    4. LRTI (lower respiratory tract infection)      Plan:     Followed for SSc-ILD with PAH. Currently holding her MMF due to acute illness. No desaturations on her previous 6MWT and distance  unchanged. Has known PH which is stable on Adcirca. Concerns for progression of her underlying disease given lack of response from steroids and antibiotic therapy. Previous cultures from her bronchoscopy were negative. She is awaiting OFEV prescription.     Continue to follow with GI for GERD/scleroderma esophagus. Discussed elevation of the head of her bed to help with nocturnal symptoms. Continue with PPI. Her uncontrolled reflux is likely contributing to her worsening symptoms.     Continue Adcirca and PH follow-up.     Start levaquin 750 mg daily x 7 days. Will check respiratory culture. Resume MMF once antibiotics are complete.     5. RTC in 1 month or sooner if needed.       Claire Pierre PA-C  [facility]

## 2024-04-02 NOTE — LETTER
April 2, 2024        Luis Ahuja  [address]  [facility] [address]  Phone: [phone]  Fax: [phone]             [facility] 1st Fl  [address]  [facility] [address]  Phone: [phone]   Patient: Yamel Shah   MR Number: [id number]   YOB: 1950   Date of Visit: 4/2/2024       Dear Dr. Luis Ahuja    Thank you for referring Yamel Shah to me for evaluation. Attached you will find relevant portions of my assessment and plan of care.    If you have questions, please do not hesitate to call me. I look forward to following Yamel Shah along with you.    Sincerely,    FABRIZIO Ramososure    If you would like to receive this communication electronically, please contact [email or web address] or [phone] to request [employer] access.    [employer] is a tool which provides read-only access to select patient information with whom you have a relationship. Its easy to use and provides real time access to review your patients record including encounter summaries, notes, results, and demographic information.    If you feel you have received this communication in error or would no longer like to receive these types of communications, please e-mail [email or web address]

## 2024-04-02 NOTE — TELEPHONE ENCOUNTER
Patient notified via [facility].  ----- Message from Roberta Leigh DO sent at 4/2/2024 10:43 AM CDT -----  Regarding: RE: Follow up questions after office visit  Yes follow up with PCP and rheum for anemia.  Can refer to GI.  Delmis is fine if she was managed by Kelly in the past but may get in sooner with another gastroenterologist.      Yes follow-up with PCP    OK to start OFEV.  Would wait until she finishes abx just because it could cause GI issues that may worsen with abx use.    Agree hold MMF till she finishes abx  ----- Message -----  From: Johanna Fernandez  Sent: 4/2/2024  10:25 AM CDT  To: Roberta Leigh DO; #  Subject: Follow up questions after office visit           This patient has a few questions she forgot to ask while in clinic.     1. She asked about her fatigue and asked if it was due to her dropping H/H (she read it has gone from Hgb 11 -->9), that she was on iron infusions in the past (more than two years ago). Patient denies tarry stool, coffee ground, black, etc. She reports she thought anemia was managed by Dr. Mendoza (GI), she is aware now that she is no longer at [facility]. She is asking if she should be referred to Dr. Benites?     2. Regarding fatigue, concern by patient for anemia, I advised her to follow up with her PCP to inquire on further H/H mgmt, iron studies, TSH, etc. She states she will do so.     3. She has not started OFEV yet, she is set to receive it tomorrow (Wednesday). Should she wait to start it or is it ok to start now?    4. I will let her know to restart her cellcept after she finishes her antibiotics, she wasn't sure.     Thank you.

## 2024-04-04 ENCOUNTER — TELEPHONE (OUTPATIENT)
Dept: TRANSPLANT | Facility: CLINIC | Age: 74
End: 2024-04-04
Payer: MEDICARE

## 2024-04-04 LAB
BACTERIA SPEC AEROBE CULT: NORMAL
BACTERIA SPEC AEROBE CULT: NORMAL
GRAM STN SPEC: NORMAL

## 2024-04-04 NOTE — TELEPHONE ENCOUNTER
Notified patient via [facility] Message.  ----- Message from Roberta Leigh DO sent at 4/4/2024  2:36 PM CDT -----  Nothing further.  Supportive care.  ----- Message -----  From: Johanna Fernandez  Sent: 4/4/2024   2:08 PM CDT  To: Roberta Leigh,     Please advise if any further action needed.

## 2024-04-08 ENCOUNTER — OFFICE VISIT (OUTPATIENT)
Dept: RHEUMATOLOGY | Facility: CLINIC | Age: 74
End: 2024-04-08
Payer: MEDICARE

## 2024-04-08 ENCOUNTER — LAB VISIT (OUTPATIENT)
Dept: LAB | Facility: HOSPITAL | Age: 74
End: 2024-04-08
Payer: MEDICARE

## 2024-04-08 VITALS
DIASTOLIC BLOOD PRESSURE: 66 MMHG | HEART RATE: 75 BPM | BODY MASS INDEX: 24.99 KG/M2 | SYSTOLIC BLOOD PRESSURE: 119 MMHG | HEIGHT: 65 IN | WEIGHT: 150 LBS

## 2024-04-08 DIAGNOSIS — J84.9 ILD (INTERSTITIAL LUNG DISEASE): ICD-10-CM

## 2024-04-08 DIAGNOSIS — I27.29 PULMONARY HYPERTENSION ASSOCIATED WITH SYSTEMIC DISORDER: Chronic | ICD-10-CM

## 2024-04-08 DIAGNOSIS — D50.9 IRON DEFICIENCY ANEMIA, UNSPECIFIED IRON DEFICIENCY ANEMIA TYPE: ICD-10-CM

## 2024-04-08 DIAGNOSIS — M34.9 SCLERODERMA: ICD-10-CM

## 2024-04-08 DIAGNOSIS — Z79.899 IMMUNOSUPPRESSION DUE TO DRUG THERAPY: ICD-10-CM

## 2024-04-08 DIAGNOSIS — I87.2 VENOUS INSUFFICIENCY OF BOTH LOWER EXTREMITIES: ICD-10-CM

## 2024-04-08 DIAGNOSIS — L97.529 SKIN ULCERS OF BOTH FEET: ICD-10-CM

## 2024-04-08 DIAGNOSIS — L97.519 SKIN ULCERS OF BOTH FEET: ICD-10-CM

## 2024-04-08 DIAGNOSIS — M34.9 SCLERODERMA WITH PULMONARY INVOLVEMENT: ICD-10-CM

## 2024-04-08 DIAGNOSIS — D84.821 IMMUNOSUPPRESSION DUE TO DRUG THERAPY: ICD-10-CM

## 2024-04-08 DIAGNOSIS — I73.00 RAYNAUD'S DISEASE WITHOUT GANGRENE: ICD-10-CM

## 2024-04-08 DIAGNOSIS — I27.20 PULMONARY HYPERTENSION: ICD-10-CM

## 2024-04-08 DIAGNOSIS — D53.9 ANEMIA ASSOCIATED WITH NUTRITIONAL DEFICIENCY: ICD-10-CM

## 2024-04-08 DIAGNOSIS — M34.9 SCLERODERMA: Primary | ICD-10-CM

## 2024-04-08 DIAGNOSIS — K21.9 GASTROESOPHAGEAL REFLUX DISEASE, UNSPECIFIED WHETHER ESOPHAGITIS PRESENT: ICD-10-CM

## 2024-04-08 DIAGNOSIS — D53.9 ANEMIA DUE TO POOR NUTRITION: ICD-10-CM

## 2024-04-08 LAB
BASOPHILS # BLD AUTO: 0.04 K/UL (ref 0–0.2)
BASOPHILS NFR BLD: 0.7 % (ref 0–1.9)
CRP SERPL-MCNC: 20.9 MG/L (ref 0–8.2)
DIFFERENTIAL METHOD BLD: ABNORMAL
EOSINOPHIL # BLD AUTO: 0.1 K/UL (ref 0–0.5)
EOSINOPHIL NFR BLD: 1.3 % (ref 0–8)
ERYTHROCYTE [DISTWIDTH] IN BLOOD BY AUTOMATED COUNT: 16.9 % (ref 11.5–14.5)
ERYTHROCYTE [SEDIMENTATION RATE] IN BLOOD BY PHOTOMETRIC METHOD: 95 MM/HR (ref 0–36)
FERRITIN SERPL-MCNC: 14 NG/ML (ref 20–300)
FOLATE SERPL-MCNC: 5.7 NG/ML (ref 4–24)
HCT VFR BLD AUTO: 36.8 % (ref 37–48.5)
HGB BLD-MCNC: 11.1 G/DL (ref 12–16)
IMM GRANULOCYTES # BLD AUTO: 0.01 K/UL (ref 0–0.04)
IMM GRANULOCYTES NFR BLD AUTO: 0.2 % (ref 0–0.5)
IRON SERPL-MCNC: 26 UG/DL (ref 30–160)
LYMPHOCYTES # BLD AUTO: 1.5 K/UL (ref 1–4.8)
LYMPHOCYTES NFR BLD: 26.8 % (ref 18–48)
MCH RBC QN AUTO: 25.5 PG (ref 27–31)
MCHC RBC AUTO-ENTMCNC: 30.2 G/DL (ref 32–36)
MCV RBC AUTO: 85 FL (ref 82–98)
MONOCYTES # BLD AUTO: 0.3 K/UL (ref 0.3–1)
MONOCYTES NFR BLD: 5.9 % (ref 4–15)
NEUTROPHILS # BLD AUTO: 3.7 K/UL (ref 1.8–7.7)
NEUTROPHILS NFR BLD: 65.1 % (ref 38–73)
NRBC BLD-RTO: 0 /100 WBC
PLATELET # BLD AUTO: 242 K/UL (ref 150–450)
PMV BLD AUTO: 11.7 FL (ref 9.2–12.9)
RBC # BLD AUTO: 4.35 M/UL (ref 4–5.4)
SATURATED IRON: 7 % (ref 20–50)
TOTAL IRON BINDING CAPACITY: 388 UG/DL (ref 250–450)
TRANSFERRIN SERPL-MCNC: 262 MG/DL (ref 200–375)
VIT B12 SERPL-MCNC: 604 PG/ML (ref 210–950)
WBC # BLD AUTO: 5.6 K/UL (ref 3.9–12.7)

## 2024-04-08 PROCEDURE — 99215 OFFICE O/P EST HI 40 MIN: CPT | Mod: PBBFAC,TXP | Performed by: STUDENT IN AN ORGANIZED HEALTH CARE EDUCATION/TRAINING PROGRAM

## 2024-04-08 PROCEDURE — 99999 PR PBB SHADOW E&M-EST. PATIENT-LVL V: CPT | Mod: PBBFAC,GC,TXP, | Performed by: STUDENT IN AN ORGANIZED HEALTH CARE EDUCATION/TRAINING PROGRAM

## 2024-04-08 PROCEDURE — 86140 C-REACTIVE PROTEIN: CPT | Mod: TXP | Performed by: STUDENT IN AN ORGANIZED HEALTH CARE EDUCATION/TRAINING PROGRAM

## 2024-04-08 PROCEDURE — 82728 ASSAY OF FERRITIN: CPT | Mod: TXP | Performed by: STUDENT IN AN ORGANIZED HEALTH CARE EDUCATION/TRAINING PROGRAM

## 2024-04-08 PROCEDURE — 85025 COMPLETE CBC W/AUTO DIFF WBC: CPT | Mod: TXP | Performed by: STUDENT IN AN ORGANIZED HEALTH CARE EDUCATION/TRAINING PROGRAM

## 2024-04-08 PROCEDURE — 36415 COLL VENOUS BLD VENIPUNCTURE: CPT | Mod: NTX | Performed by: STUDENT IN AN ORGANIZED HEALTH CARE EDUCATION/TRAINING PROGRAM

## 2024-04-08 PROCEDURE — 82607 VITAMIN B-12: CPT | Mod: TXP | Performed by: STUDENT IN AN ORGANIZED HEALTH CARE EDUCATION/TRAINING PROGRAM

## 2024-04-08 PROCEDURE — 85652 RBC SED RATE AUTOMATED: CPT | Mod: TXP | Performed by: STUDENT IN AN ORGANIZED HEALTH CARE EDUCATION/TRAINING PROGRAM

## 2024-04-08 PROCEDURE — 82746 ASSAY OF FOLIC ACID SERUM: CPT | Mod: TXP | Performed by: STUDENT IN AN ORGANIZED HEALTH CARE EDUCATION/TRAINING PROGRAM

## 2024-04-08 PROCEDURE — 99215 OFFICE O/P EST HI 40 MIN: CPT | Mod: S$PBB,GC,NTX, | Performed by: STUDENT IN AN ORGANIZED HEALTH CARE EDUCATION/TRAINING PROGRAM

## 2024-04-08 PROCEDURE — 83540 ASSAY OF IRON: CPT | Mod: TXP | Performed by: STUDENT IN AN ORGANIZED HEALTH CARE EDUCATION/TRAINING PROGRAM

## 2024-04-08 ASSESSMENT — ROUTINE ASSESSMENT OF PATIENT INDEX DATA (RAPID3)
TOTAL RAPID3 SCORE: 4.22
PSYCHOLOGICAL DISTRESS SCORE: 3.3
FATIGUE SCORE: 5
MDHAQ FUNCTION SCORE: 0.8
PATIENT GLOBAL ASSESSMENT SCORE: 5
PAIN SCORE: 5
AM STIFFNESS SCORE: 0, NO

## 2024-04-08 NOTE — PROGRESS NOTES
4/6/2024    11:04 PM   Rapid3 Question Responses and Scores   MDHAQ Score 0.8   Psychologic Score 3.3   Pain Score 5   When you awakened in the morning OVER THE LAST WEEK, did you feel stiff? No   Fatigue Score 5   Global Health Score 5   RAPID3 Score 4.22     Answers submitted by the patient for this visit:  Rheumatology Questionnaire (Submitted on 4/6/2024)  fever: No  eye redness: No  mouth sores: No  headaches: No  shortness of breath: Yes  chest pain: No  trouble swallowing: Yes  diarrhea: No  constipation: No  unexpected weight change: Yes  genital sore: No  dysuria: No  During the last 3 days, have you had a skin rash?: No  Bruises or bleeds easily: No  cough: Yes

## 2024-04-08 NOTE — PATIENT INSTRUCTIONS
Try to start having some protein shakes. Boost, Ensure, Premier Protein all have pre made protein drinks. You can also purchase protein powder to mix your own if preferred.

## 2024-04-08 NOTE — PROGRESS NOTES
Subjective:      Patient ID: Yamel Shah is a 73 y.o. female.    Chief Complaint: follow up for scleroderma     Yamel Shah is a 71yo F with PMH of systemic sclerosis with interstitial lung disease, HFpEF, BLE venous insufficiency with chronic ulcers/wounds, s/p laser venous ablation (12/2020), GERD, Pagan's esophagus, s/p hernia repair (5/2023).    Rheum History:  - SSc with ILD dx in 1983  - Serologies: +SSA, +Scl-70  - Previously followed with podiatry and vascular surgery for the venous insufficiency and wounds, but now just cares for them herself  - Has chronically elevated sed rate and CRP  - RHC/LHC (3/2023) with normal pressures   - Follows with pulmonology Dr. Leigh, who was considering initiation of anti fibrotic therapy  - Follows with cardiology, last saw Claire Pelaez NP (9/2023)  - Was dx with covid while on a cruise on 8/28, was hospitalized in [place] for supportive care  - Had prolonged course of symptoms/illness from covid  - At 10/2023 visit, pt was feeling close to her baseline  - Most recent PFTs, 6MWT, TTE were done in 3/2024  - Has been dealing with respiratory infection again most recently as detailed below    Current Treatments:  - Mycophenolate mofetil liquid suspension 1000mg BID (2019-current)  - Ofev per pulm (4/2024-current)  - Tadalafil per pulm     Interval History:  Pt is here for follow up today, was last seen in 10/2023. Since then, she states that she has been dealing with a respiratory illnesses. She has had a prolonged course for the past 1.5mos or so. She has seen Dr. Leigh and team a couple times during this period. Most recently, they saw her last week and had prescribed a course of levaquin which has helped she feels. She will be completing the abx today and then resuming mycophenolate afterwards this week. She has also been started on adcirca for PH as well as ofev for anti fibrotic therapy. She just started the ofev last week.    Pt denies  "any new skin changes, she feels her chronic skin changes have remained stable. Ulcers on the feet/ankles are stable and she continues to care for them herself at home.     Review of Systems   Constitutional:  Positive for unexpected weight change. Negative for fever.   HENT:  Positive for trouble swallowing. Negative for mouth sores.    Eyes:  Negative for redness.   Respiratory:  Positive for cough and shortness of breath.    Cardiovascular:  Negative for chest pain.   Gastrointestinal:  Negative for constipation and diarrhea.   Genitourinary:  Negative for dysuria and genital sores.   Skin:  Negative for rash.   Neurological:  Negative for headaches.   Hematological:  Does not bruise/bleed easily.      Objective:   /66 (BP Location: Right arm, Patient Position: Sitting, BP Method: Medium (Automatic))   Pulse 75   Ht 5' 5" (1.651 m)   Wt 68 kg (150 lb)   BMI 24.96 kg/m²   Physical Exam   Constitutional: She is oriented to person, place, and time. She appears well-developed and well-nourished. No distress.   HENT:   Head: Normocephalic and atraumatic.   Right Ear: External ear normal.   Left Ear: External ear normal.   Nose: Nose normal. No nasal congestion.   Mouth/Throat: Mucous membranes are moist. Oropharynx is clear.   Eyes: Conjunctivae are normal.   Cardiovascular: Normal rate, regular rhythm and normal heart sounds.   No murmur heard.  Pulmonary/Chest: Effort normal. Stridor present. No respiratory distress. She has wheezes (mild inspiratory wheezes).   Pulmonary Comments: Velcro crackles present primarily at the lung bases. Pt also with mild rhonchi in the mid to lower lung fields as well.  Abdominal: Soft. She exhibits no distension. There is no abdominal tenderness.   Musculoskeletal:         General: Swelling (BLE edema present, there is also a small R knee effusion) present.      Cervical back: Normal range of motion and neck supple.      Comments: No synovitis in any of the small or large " joints. There is limited ROM of the hands and feet in setting of chronic scleroderma skin changes. 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 Thickened/tightened skin in multiple areas as detailed below in skin assessment. Bilateral feet with chronic slow healing wounds, wrapped.   Neurological: She is alert and oriented to person, place, and time.   Skin: Skin is warm and dry.   MRSS 20. There is sclerodactyly with chronic skin thickened changes. Fingertips are dry. No pitting ulcers. Bilateral feet/ankles with chronic skin thickening and ulcer changes. No significant warmth, erythema, purulence.   Psychiatric: Her behavior is normal. Mood normal.   Vitals reviewed.    Modified Skin Score:    Face= 0  Chest= 0  Abdomen=0         Right Upper Arm= 0     Left Upper Arm= 0  Right Lower Arm= 0     Left Lower Arm= 0  Right Hand= 0              Left Hand= 0  Right Fingers= 3          Left Fingers= 3  Right Upper Leg= 0     Left Upper Leg=0  Right Lower Leg= 1     Left Lower Leg= 1  Right Foot= 3               Left Foot= 3     TOTAL: 14     Assessment:     1. Scleroderma    2. ILD (interstitial lung disease)    3. Raynaud's disease without gangrene    4. Pulmonary hypertension associated with systemic disorder    5. Venous insufficiency of both lower extremities    6. Pulmonary hypertension    7. Immunosuppression due to drug therapy    8. Scleroderma with pulmonary involvement    9. Iron deficiency anemia, unspecified iron deficiency anemia type    10. Skin ulcers of both feet    11. Anemia due to poor nutrition    12. Anemia associated with nutritional deficiency    13. Gastroesophageal reflux disease, unspecified whether esophagitis present      - Pt with established SSc with ILD for many years  - Pt has recently been dealing with prolonged respiratory infections, previously in August-September and now this past month or so  - Prior to these infectious issues, lung function and chronic skin changes had been stable  - She  follows regularly with pulmonology, Dr. Leigh as well as the PH clinic  - Echo and lung function studies have been stable     Plan:     Problem List Items Addressed This Visit          Pulmonary    ILD (interstitial lung disease)       Cardiac/Vascular    Pulmonary hypertension associated with systemic disorder (Chronic)    Venous insufficiency of both lower extremities    Raynaud's disease without gangrene    Pulmonary hypertension       Immunology/Multi System    Scleroderma with pulmonary involvement    Scleroderma - Primary    Relevant Orders    CBC Auto Differential (Completed)    Iron and TIBC (Completed)    FERRITIN (Completed)    FOLATE (Completed)    VITAMIN B12 (Completed)    Sedimentation rate (Completed)    C-Reactive Protein (Completed)    Ambulatory referral/consult to Gastroenterology    Immunosuppression due to drug therapy       Oncology    ROXANNE (iron deficiency anemia)    Relevant Orders    CBC Auto Differential (Completed)    Iron and TIBC (Completed)    FERRITIN (Completed)    FOLATE (Completed)    VITAMIN B12 (Completed)    Sedimentation rate (Completed)    C-Reactive Protein (Completed)       Orthopedic    Skin ulcers of both feet     Other Visit Diagnoses       Anemia due to poor nutrition        Relevant Orders    FOLATE (Completed)    Anemia associated with nutritional deficiency        Relevant Orders    VITAMIN B12 (Completed)    Gastroesophageal reflux disease, unspecified whether esophagitis present        Relevant Orders    Ambulatory referral/consult to Gastroenterology          - Resume MMF this Thursday (4/11/24) a few days after completing abx course   - CBC and anemia labs today  - Continue with ofev and adcirca per pulm/PH teams  - At this time, pt does not appear to require additional immunosuppressive agents  - If she does need further immune therapy in the future, could consider rituximab  - Referral placed to GI since pt needs new gastroenterologist for monitoring of her  esophageal involvement    Follow up here in clinic in about 3 months or earlier if needed.    Assessment and plan discussed with supervising attending, Dr. Ahuja.      Saima Bennett MD  PGY-4, Rheumatology

## 2024-04-08 NOTE — PROGRESS NOTES
"I have personally reviewed the history, confirmed exam findings, and discussed assessment and plan with fellow.             PFTs       FVC     SVC      RV     DLco    TLC    3/4/24     75.1                  47.2    40.4      63.3  9/18/23   66.1                  44.2    42.1      52.8  5/22/23   71.1                   68.5   52.3      66.8  10/7/22   67.6                    63.1  68.9      62.7  1/3/22     67.5   5/25/21   67.8                    59.3   64.1      62.4  1/10/20    74                              72  3/12/19    60         64         61      82  3/6/18      64         70         64      112  4/8/16      70         70         89       84  9/4/15      66         62         72       71  1/19/15    66      Narrative & Impression  EXAMINATION:  XR CHEST AP PORTABLE     CLINICAL HISTORY:  Provided history is "CHF;  ".     TECHNIQUE:  One view of the chest.     COMPARISON:  09/23/2023.     FINDINGS:  Cardiomediastinal silhouette is prominent and similar to prior study, potentially exaggerated by portable technique.  Atherosclerotic calcifications overlie the aortic arch.  There are coarse interstitial lung markings with chronic appearing interstitial changes throughout both lungs, better evaluated on prior chest CT dated 09/08/2023.  Suspect new/worse patchy perihilar and lower lung zone ground-glass and airspace opacities, possibly exaggerated by soft tissue attenuation of the x-ray beam, low lung volumes, and portable technique.  Upper lungs are relatively stable with chronic interstitial changes but no focal consolidation.  No large pleural effusion.  No distinct pneumothorax.     Impression:     Findings suggestive of chronic interstitial lung disease with possible new ground-glass or airspace opacities in the perihilar and lower lung zones.  Suggest correlation for superimposed pulmonary edema or infectious/inflammatory process.  Follow-up PA and lateral views may provide improved sensitivity if there is " persistent clinical concern.        Electronically signed by: Kolton Agudelo MD  Date:                                            12/31/2023  Time:                                           07:55                     EXAMINATION:  CT CHEST WITHOUT CONTRAST     CLINICAL HISTORY:  Lung nodule, 6-8mm;3 month follow up solitary pulmonary nodule; Abnormal findings on diagnostic imaging of other specified body structures     TECHNIQUE:  Low dose axial images, sagittal and coronal reformations were obtained from the thoracic inlet to the lung bases. Contrast was not administered.     All CT scans at this facility use dose modulation, iterative reconstruction and/or weight based dosing when appropriate to reduce rad iation dose to as low as reasonably achievable.     COMPARISON:  12/22/2020, 06/02/2023     FINDINGS:  No intravenous contrast was administered for this examination.  Therefore, it may have diminished sensitivity for detection of certain abnormalities.     Thyroid gland: Within normal limits.     Thoracic soft tissues: Unremarkable.     Mediastinum: No pathologically enlarged lymph nodes.     Cardiovascular: Normal heart size.No pericardial effusion.     Trachea: Within normal limits.     Lungs/pleura/airways:     Redemonstration of basilar and subpleural predominant reticulation and ground-glass with architectural distortion, volume loss, and traction bronchiectasis/bronchiolectasis.  Worsening of patchy areas of ground-glass bilaterally.  There is a straight edge sign present with sharply marginated basilar pseudo honeycombing likely representing clustered dilated bronchiectasis and cystic bronchiectasis.  Anterior upper lobe sign also noted.  These findings are grossly stable compared to CT 12/22/2020.     0.6 cm solid pulmonary nodule in the left upper lobe (4-193), 0.4 cm solid pulmonary nodule left upper lobe (4-212), right upper lobe 0.4 cm solid pulmonary nodule (4-89).  These nodules are unchanged from  CT 06/02/2023, but new from CT 12/22/2020..     Esophagus: Esophagus is dilated and fluid-filled to the level of the aortic arch compatible with achalasia in the setting of scleroderma..     Upper abdomen:     Partially imaged.  No significant abnormalities.     Bones: Unremarkable for stated age.     Impression:     Redemonstration of pulmonary fibrosis with mild increased bilateral patchy areas of patchy ground-glass.  Findings favored represent as NSIP patient with history of scleroderma.     Solid pulmonary nodules measuring up to 0.6 cm as detailed above which are stable from CT 06/02/2023, but new from 12/22/2020.  This can be reassessed in the next follow-up     Stable patulous esophagus.     Electronically signed by resident: Mikala Kruse  Date:                                            09/08/2023  Time:                                           15:29     Electronically signed by: Bg Granado  Date:                                            09/08/2023  Time:                                           17:14     TTE 3/15/24:           Left Ventricle: The left ventricle is normal in size. Normal wall thickness. Normal wall motion. There is normal systolic function with a visually estimated ejection fraction of 55 - 60%. There is normal diastolic function.    Right Ventricle: Normal right ventricular cavity size. Wall thickness is normal. Right ventricle wall motion  is normal. Systolic function is normal.    The left atrium is moderately dilated.    Tricuspid Valve: There is mild regurgitation.    Pulmonary Artery: The estimated pulmonary artery systolic pressure is 43 mmHg.    IVC/SVC: Intermediate venous pressure at 8 mmHg.     RHC 3/16/23:     The estimated blood loss was none.    The filling pressures on the right and left were normal.    RA 5  PA 35/10 (18)  PCWP 10    CO 6.9 l/min  CI 3.9 l/min/m2.       Saw Claire Pierre in [facility] 4/2/24  1. ILD (interstitial lung disease)    2.  Scleroderma with pulmonary involvement    3. WHO group 1 pulmonary arterial hypertension    4. LRTI (lower respiratory tract infection)       Plan:      Followed for SSc-ILD with PAH. Currently holding her MMF due to acute illness. No desaturations on her previous 6MWT and distance unchanged. Has known PH which is stable on Adcirca. Concerns for progression of her underlying disease given lack of response from steroids and antibiotic therapy. Previous cultures from her bronchoscopy were negative. She is awaiting OFEV prescription.      Continue to follow with GI for GERD/scleroderma esophagus. Discussed elevation of the head of her bed to help with nocturnal symptoms. Continue with PPI. Her uncontrolled reflux is likely contributing to her worsening symptoms.      Continue Adcirca and PH follow-up.      Start levaquin 750 mg daily x 7 days. Will check respiratory culture. Resume MMF once antibiotics are complete.      5. RTC in 1 month or sooner if needed.     dcSSc MRSS 14 stable  ILD as above HRCT chest I Sept  and PFTs improved  3/4/24  Now clinically respiratory approximately at baseline, despite being off mycophenolate for weeks  Chronic foot ulcers, venous stasis, venous insufficiency and scleroderm  PH normal PAP on  RHC 3/16/23  ROXANNE     Recheck cbc and anemia labs  F/u Gastro, Dr. Mendoza left, ref to Dr Benites for dysphagia and ROXANNE   acute respiratory syndrome resolved with Z-Rajat(azithromycin) and then levafloxacin  Resume mycophenolate  suspension 1000mg twice daily on Thursday, 3 days after completing levofloxacin     F/u with Claire Pierre in [facility], has started nintedanib     No need to add rituximab as recent exacerbation infectious rather than rapid progession of ILD     *Covid vaccine booster  RTC 3 months with standing labs

## 2024-04-09 DIAGNOSIS — M34.9 SCLERODERMA WITH PULMONARY INVOLVEMENT: Primary | ICD-10-CM

## 2024-04-09 DIAGNOSIS — Z79.899 HIGH RISK MEDICATION USE: ICD-10-CM

## 2024-04-09 DIAGNOSIS — R13.10 DYSPHAGIA, UNSPECIFIED TYPE: ICD-10-CM

## 2024-04-09 NOTE — PROGRESS NOTES
- CBC with persistent/stable normocytic anemia, improved from prior few weeks ago  - Sed rate remains elevated, but more than prior likely in setting of recent respiratory infection (pt completing abx still)  - CRP improved from prior  - Iron studies indicative of likely ROXANNE and anemia of chronic disease, I had already discussed with pt to increase dietary iron intake during clinic visit   - Folate and B12 within normal ranges

## 2024-04-22 NOTE — PROGRESS NOTES
Subjective:       Patient ID: Yamel Shah is a 68 y.o. female.    Chief Complaint: Nasal Congestion; Headache; Sore Throat; and Cough    HPI   Pt with ILD is here c/o 2 days of worsening sinus/chest congestion, mild productive cough, post nasal drip, sore throat, subjective fevers with chills. Minimal relief with Promethazin with codeine syrup.   Review of Systems   Constitutional: Positive for chills, fatigue and fever. Negative for activity change, appetite change, diaphoresis and unexpected weight change.   HENT: Positive for congestion, postnasal drip, rhinorrhea, sinus pressure, sinus pain and sore throat. Negative for sneezing, trouble swallowing and voice change.    Respiratory: Positive for cough. Negative for shortness of breath and wheezing.    Cardiovascular: Negative for chest pain, palpitations and leg swelling.   Gastrointestinal: Negative for abdominal pain, blood in stool, constipation, diarrhea, nausea and vomiting.   Genitourinary: Negative for dysuria.   Musculoskeletal: Negative for arthralgias and myalgias.   Skin: Negative for rash and wound.   Allergic/Immunologic: Negative for environmental allergies and food allergies.   Hematological: Negative for adenopathy. Does not bruise/bleed easily.       Objective:      Physical Exam   Constitutional: She is oriented to person, place, and time. She appears well-developed and well-nourished. No distress.   HENT:   Head: Normocephalic and atraumatic.   Nose: Mucosal edema and rhinorrhea present.   Eyes: Conjunctivae and EOM are normal. Pupils are equal, round, and reactive to light. Right eye exhibits no discharge. Left eye exhibits no discharge. No scleral icterus.   Neck: Neck supple. No JVD present.   Cardiovascular: Normal rate, regular rhythm, normal heart sounds and intact distal pulses.   Pulmonary/Chest: Effort normal and breath sounds normal. No respiratory distress. She has no wheezes. She has no rales.   Diffuse dry crackles    Abdominal: Soft. Bowel sounds are normal. There is no guarding.   Musculoskeletal: She exhibits no edema.   Lymphadenopathy:     She has no cervical adenopathy.   Neurological: She is alert and oriented to person, place, and time.   Skin: Skin is warm and dry. No rash noted. She is not diaphoretic. No pallor.       Assessment:       1. Acute non-recurrent sinusitis of other sinus    2. Acute bacterial bronchitis    3. ILD (interstitial lung disease)        Plan:    1. Rx Xyzal/Flonase, Kenalog 40 mg IM   2. Rx Cheratussin AC TID PRN   3. Stable, continue to monitor        no

## 2024-04-29 ENCOUNTER — OFFICE VISIT (OUTPATIENT)
Dept: PAIN MEDICINE | Facility: CLINIC | Age: 74
End: 2024-04-29
Payer: MEDICARE

## 2024-04-29 ENCOUNTER — TELEPHONE (OUTPATIENT)
Dept: PAIN MEDICINE | Facility: CLINIC | Age: 74
End: 2024-04-29

## 2024-04-29 ENCOUNTER — OFFICE VISIT (OUTPATIENT)
Dept: TRANSPLANT | Facility: CLINIC | Age: 74
End: 2024-04-29
Payer: MEDICARE

## 2024-04-29 ENCOUNTER — HOSPITAL ENCOUNTER (OUTPATIENT)
Dept: PULMONOLOGY | Facility: CLINIC | Age: 74
Discharge: HOME | End: 2024-04-29
Payer: MEDICARE

## 2024-04-29 VITALS
HEIGHT: 65 IN | WEIGHT: 150.81 LBS | SYSTOLIC BLOOD PRESSURE: 145 MMHG | DIASTOLIC BLOOD PRESSURE: 64 MMHG | OXYGEN SATURATION: 99 % | BODY MASS INDEX: 25.13 KG/M2 | HEART RATE: 82 BPM

## 2024-04-29 VITALS
BODY MASS INDEX: 24.99 KG/M2 | HEART RATE: 80 BPM | DIASTOLIC BLOOD PRESSURE: 62 MMHG | SYSTOLIC BLOOD PRESSURE: 133 MMHG | WEIGHT: 149.94 LBS | OXYGEN SATURATION: 100 % | WEIGHT: 150 LBS | BODY MASS INDEX: 24.98 KG/M2 | HEIGHT: 65 IN | RESPIRATION RATE: 12 BRPM | HEIGHT: 65 IN

## 2024-04-29 DIAGNOSIS — G60.9 IDIOPATHIC NEUROPATHY: ICD-10-CM

## 2024-04-29 DIAGNOSIS — Z51.81 ENCOUNTER FOR MONITORING OPIOID MAINTENANCE THERAPY: ICD-10-CM

## 2024-04-29 DIAGNOSIS — I27.20 PULMONARY HYPERTENSION: ICD-10-CM

## 2024-04-29 DIAGNOSIS — R06.02 SHORTNESS OF BREATH: ICD-10-CM

## 2024-04-29 DIAGNOSIS — I27.9 CHRONIC PULMONARY HEART DISEASE: ICD-10-CM

## 2024-04-29 DIAGNOSIS — D50.9 IRON DEFICIENCY ANEMIA, UNSPECIFIED IRON DEFICIENCY ANEMIA TYPE: ICD-10-CM

## 2024-04-29 DIAGNOSIS — D84.821 IMMUNOSUPPRESSION DUE TO DRUG THERAPY: ICD-10-CM

## 2024-04-29 DIAGNOSIS — I27.21 WHO GROUP 1 PULMONARY ARTERIAL HYPERTENSION: Primary | ICD-10-CM

## 2024-04-29 DIAGNOSIS — J84.9 ILD (INTERSTITIAL LUNG DISEASE): ICD-10-CM

## 2024-04-29 DIAGNOSIS — Z79.891 ENCOUNTER FOR MONITORING OPIOID MAINTENANCE THERAPY: ICD-10-CM

## 2024-04-29 DIAGNOSIS — G89.4 CHRONIC PAIN DISORDER: Primary | ICD-10-CM

## 2024-04-29 DIAGNOSIS — M34.9 SCLERODERMA WITH PULMONARY INVOLVEMENT: ICD-10-CM

## 2024-04-29 DIAGNOSIS — M34.89 CUTANEOUS SCLERODERMA: ICD-10-CM

## 2024-04-29 DIAGNOSIS — I87.2 VENOUS INSUFFICIENCY OF BOTH LOWER EXTREMITIES: ICD-10-CM

## 2024-04-29 DIAGNOSIS — M79.18 MYOFASCIAL PAIN: ICD-10-CM

## 2024-04-29 DIAGNOSIS — Z79.899 IMMUNOSUPPRESSION DUE TO DRUG THERAPY: ICD-10-CM

## 2024-04-29 PROCEDURE — 99214 OFFICE O/P EST MOD 30 MIN: CPT | Mod: S$PBB,,,

## 2024-04-29 PROCEDURE — 94618 PULMONARY STRESS TESTING: CPT | Mod: PBBFAC,NTX | Performed by: INTERNAL MEDICINE

## 2024-04-29 PROCEDURE — 94618 PULMONARY STRESS TESTING: CPT | Mod: 26,S$PBB,NTX, | Performed by: INTERNAL MEDICINE

## 2024-04-29 PROCEDURE — 99214 OFFICE O/P EST MOD 30 MIN: CPT | Mod: S$PBB,GC,, | Performed by: ANESTHESIOLOGY

## 2024-04-29 PROCEDURE — 99999 PR PBB SHADOW E&M-EST. PATIENT-LVL V: CPT | Mod: PBBFAC,,,

## 2024-04-29 PROCEDURE — 99999 PR PBB SHADOW E&M-EST. PATIENT-LVL IV: CPT | Mod: PBBFAC,,, | Performed by: ANESTHESIOLOGY

## 2024-04-29 PROCEDURE — 99214 OFFICE O/P EST MOD 30 MIN: CPT | Mod: PBBFAC,27,25,NTX | Performed by: ANESTHESIOLOGY

## 2024-04-29 PROCEDURE — 99215 OFFICE O/P EST HI 40 MIN: CPT | Mod: PBBFAC

## 2024-04-29 RX ORDER — NABUMETONE 750 MG/1
750 TABLET, FILM COATED ORAL DAILY PRN
Qty: 30 TABLET | Refills: 2 | Status: SHIPPED | OUTPATIENT
Start: 2024-04-29

## 2024-04-29 RX ORDER — TIZANIDINE 4 MG/1
8 TABLET ORAL 2 TIMES DAILY PRN
Qty: 60 TABLET | Refills: 3 | Status: SHIPPED | OUTPATIENT
Start: 2024-04-29

## 2024-04-29 RX ORDER — DIPHENHYDRAMINE HYDROCHLORIDE 50 MG/ML
50 INJECTION INTRAMUSCULAR; INTRAVENOUS ONCE AS NEEDED
Status: CANCELLED | OUTPATIENT
Start: 2024-05-06

## 2024-04-29 RX ORDER — FUROSEMIDE 20 MG/1
20 TABLET ORAL DAILY
Qty: 30 TABLET | Refills: 5 | Status: SHIPPED | OUTPATIENT
Start: 2024-04-29 | End: 2025-04-29

## 2024-04-29 RX ORDER — SODIUM CHLORIDE 0.9 % (FLUSH) 0.9 %
10 SYRINGE (ML) INJECTION
Status: CANCELLED | OUTPATIENT
Start: 2024-05-06

## 2024-04-29 RX ORDER — ACETAMINOPHEN AND CODEINE PHOSPHATE 300; 30 MG/1; MG/1
1 TABLET ORAL NIGHTLY
Qty: 30 TABLET | Refills: 0 | Status: SHIPPED | OUTPATIENT
Start: 2024-04-29 | End: 2024-05-29

## 2024-04-29 RX ORDER — PREGABALIN 300 MG/1
300 CAPSULE ORAL 2 TIMES DAILY
Qty: 60 CAPSULE | Refills: 6 | Status: SHIPPED | OUTPATIENT
Start: 2024-04-29 | End: 2024-10-28

## 2024-04-29 RX ORDER — HEPARIN 100 UNIT/ML
500 SYRINGE INTRAVENOUS
Status: CANCELLED | OUTPATIENT
Start: 2024-05-06

## 2024-04-29 RX ORDER — EPINEPHRINE 0.3 MG/.3ML
0.3 INJECTION SUBCUTANEOUS ONCE AS NEEDED
Status: CANCELLED | OUTPATIENT
Start: 2024-05-06

## 2024-04-29 NOTE — PROGRESS NOTES
Chronic Pain - Follow Up          Chief Complaint:   Chief Complaint   Patient presents with    Foot Pain        SUBJECTIVE: Disclaimer: This note has been generated using voice-recognition software. There may be typographical errors that have been missed during proof-reading    Interval History 4/29/2024:  The patient returns to clinic today for follow up of foot pain. She continues to report bilateral foot pain. Pain was more significant this weekend with swelling (R>L), denies inciting incident. Reported 7/10 pain. Improved with ace wrap on leg and increasing dose of tylenol #3 and tizanidine. Current pain 6/10. She is currently taking Lyrica, Relafen, Zanaflex and Tylenol #3 as needed with benefit. She denies any other health changes. Patient denies red flags including weakness, unexpected weight loss/gain, night sweats/fevers, saddle anesthesia, and symptoms of UBALDO.    Interval History 1/26/2024:  The patient returns to clinic today for follow up of foot pain. She continues to report bilateral foot pain. This pain is worse at night. She does sometimes wake up due to pain. She continues to have open wounds, currently on her ankles. She is currently taking Lyrica, Relafen, and Zanaflex. She also takes Tylenol #3 as needed with benefit. She denies any other health changes. Her pain today is 5/10.    Interval History 10/27/2023:  The patient returns to clinic today for follow up of foot pain. She continues to report bilateral foot pain. She describes this pain as burning and stinging in nature. She now has sores under her ankles. She is having trouble laying on her sides at night due to pain. The sores by her toes are healing. She continues to report benefit with home medication regimen. She is taking Lyrica, Zanaflex, and Relafen. She also takes Tylenol #3 with benefit. She is out of this medication. She denies any adverse effects. She denies any other health changes. Her pain today is 5/10.     Interval History  7/28/2023:  The patient returns to clinic today for follow up of neck and foot pain via virtual visit. She continues to report bilateral foot pain. This is worse at night, described as burning in nature. She does report some new ulcers to her feet. She does have a home wound care regimen. She continues to report intermittent neck pain. She is taking Lyrica, Zanaflex, and Relafen with benefit. She also takes Tylenol #3 as needed for severe pain with benefit and without adverse effects. She denies any other health changes.     Interval History 4/28/2023:  The patient returns to clinic today for follow up of neck and foot pain. Since last visit, she has had pneumonia. She also had hiatal hernia repair. She continues to report bilateral foot pain. This is burning in nature. Her pain is worse at night. She reports intermittent neck pain. She continues to take Lyrica, Zanaflex, and Relafen with benefit. She also takes Tylenol #3 for severe pain. She denies any adverse effects. She denies any other health changes. Her pain today is 4/10.    Interval History 12/30/2022:  The patient returns to clinic today for follow up of neck and foot pain. She reports increased foot pain over the last 2 months. She continues to report ulcers to her feet. She reports bilateral foot pain. Her pain is worse at night. She reports intermittent neck pain. She is taking Lyrica, Zanaflex, and Relafen with benefit. She also takes Tylenol #3 with benefit. She denies any adverse effects. She denies any other health changes. Her pain today is 6/10.    Interval History 8/26/2022:  The patient returns to clinic today for follow up neck and foot pain. She reports increased pain over the last few days. She attributes this as she is out of Lyrica. She continues to report bilateral foot pain. She continues to have wounds. She continues to perform wound care. She continues to report intermittent neck pain. She continues to take Lyrica, Zanaflex, and  Relafen with benefit. She continues to take Tylenol #3 for severe pain as needed with benefit. She denies any adverse effects. She denies any other health changes. Her pain today is 4/10.    Interval History 5/26/2022:  The patient returns to clinic today for follow up of neck and foot pain via virtual visit. She continues to report bilateral foot pain. She continues to report ulcers to her feet. She continues to report neck pain with intermittent radiating pain into her arms. She continues to report benefit with current medications. She is taking Lyrica, Zanaflex, and Relafen. She also takes Tylenol #3 with benefit. She denies any adverse effects with these medications. She continues to perform a home exercise routine. She denies any other health changes.     Interval History 2/15/22  Patient presents in follow up. Was previously Dr. King patient with primary complaints of neck and foot pain. She reports her pain has been stable since her last visit. She did have covid in January and stopped all of ehr pain medications. She got an antibody infusion. She has fully recovered. She restarted her pain medications and reports that they are effective. She is taking Tylenol 3 daily, nambumetome 750 mg BID, lyrica 150 mg BID and Zanaflex 4 mg TID. She did do PT for her neck November to January per her report. She continues with mild neck pain without radiation into the arms or hands. Pain is worse with sidebending and rotation to the right and better with rest and medications. She denies any numbness tingling weakness or b/b incontinence.    Interval History 1/4/2022:  The patient returns to clinic today for follow up of foot pain via virtual visit. She continues to report foot pain and wounds. She continues to follow up with podiatry and wound care. She reports increased neck pain. This pain is tight and aching in nature. She denies any radicular arm pain. She is currently taking Relafen, Lyrica, and Tylenol #3 with  benefit and without adverse effects. She reports limited benefit with Zanaflex. She denies any other health changes.     Interval History 12/8/2021:  The patient returns to clinic today for foot pain via virtual visit. She continues to report foot pain. She does have foot wounds. She recently saw Dr. Claudio in [facility]. He does not recommend any vascular interventions at this time. She continues to follow up with Podiatry and wound care. She continues to take Zanaflex, Relafen, Lyrica and Tylenol #3 with benefit. She denies any adverse effects. She denies any other health changes. Her pain today is 7/10.    Interval History 11/8/2021:  The patient returns to clinic today for follow up of foot pain via virtual visit. At last visit, she was starting on Tylenol #3. She does find benefit with this. She sometimes breaks this in half. She continues to take Zanaflex, Relafen, and Lyrica. She continues to report bilateral foot pain and ulcers. She continues to follow up with podiatry. Her pain is worse at night. She denies any other health changes. Her pain today is 7/10.    Interval History 10/20/2021:  The patient returns to clinic today for follow up of foot pain. At last visit, she was started on Tramadol. She did have relief but had significant side effects. She experienced sedation, itching, headaches, and decreased appetite. This has resolved after stopping the medication. She continues to report foot pain. This pain is worse at night. She continues to follow up with podiatry. She continues to take Tizanidine, Relafen, and Lyrica with some benefit. She denies any adverse effects. She denies any other health changes. Her pain today is 8/10.    Interval history 10/06/2021:  Since previous encounter the patient continues to have nonhealing wound has been followed up with wound care and is scheduled to follow with Rheumatology for the wounds in her legs she was referred to podiatry with stated that they have done nothing  different me that she with doing on her own.  She continues to have neck pain and bilateral foot pain.  She has been taking tizanidine Relafen Tylenol p.m. and Lyrica 600 mg per day.  She states that all these medications are helpful.    Interval History 6/3/2021:  The patient returns to clinic today for follow up of foot pain. She continues to report left foot pain secondary to ulcer. She is seeing Wound Care. She describes this pain as burning in nature. Her pain is worse with prolonged activity. She reports intermittent neck pain. She continues to take Zanaflex. She reports limited benefit with increased Lyrica dose. She denies any other health changes. Her pain today is 5/10.    Interval History 5/5/2021:  The patient returns to clinic today for follow up of foot pain. She reports a new ulcer on her left foot. She is seeing Wound Care. She reports increased pain with this new ulcer. She describes this pain as burning. She continues to report neck pain that is tight and aching. She denies any radicular arm pain. Her pain is worse with prolonged activity, especially turning her head to the side. She continues to perform a home exercise routine. She continues to take Lyrica and Zanaflex with benefit. She denies any other health changes. Her pain today is 5/10.    Interval History 2/8/2021:  The patient returns to clinic today for follow up of neck and foot pain. She reports that her neck pain has significantly improved since last visit. She has recently completed physical therapy for this. She is performing a home stretching routine. She reports intermittent neck pain that is tight and aching in nature. She denies any radicular arm pain. She continues to report bilateral foot pain. This is worse at night. She continues to take Lyrica and Zanaflex with benefit. She denies any other health changes. Her pain today is 4/10.    Interval History 1/8/2021:  The patient returns to clinic today for follow up of neck and  foot pain. She continues to report neck pain that is tight and aching in nature. She denies any radicular pain. She continues to participate in physical therapy and a home exercise routine. She continues to report bilateral foot pain, worse at night. She reports that increased Lyrica dose has provided improved benefit. She also takes Zanaflex with benefit. She denies any other health changes. Her pain today is 3/10.    Interval History 11/13/2020:  The patient returns to clinic today for follow up of neck and foot pain. She continues to report bilateral foot pain. Since last visit, she had a venous ablation procedure on her right leg through Vascular. She is scheduled for the left side in the next month. She continues to report bilateral foot pain, worse at night. She continues to report neck pain that is tight and aching in nature. She denies any radicular pain. Her pain is worse flexion and turning her head to the side. She is currently participating in physical therapy with some benefit. She continues to take Lyrica but does ask if this could be increased. She continues to take Zanaflex with benefit. She denies any other health changes. Her pain today is 5/10    Interval History 10/2/2020:  The patient returns to clinic today for follow up of foot pain. She reports increased bilateral foot pain over the last few months. This pain is burning in nature. This pain is worse at night. She continues to have ulcers to her feet related to her scleroderma. She would like to restart the Lyrica. She also reports increased neck pain that is sore and aching in nature. She denies any radiating arm pain. This is worse with turning her head and at night. She denies any other health changes. Her pain today is 7/10.    Interval History 6/5/2020:  The patient requests audio visit today for follow up of bilateral foot pain. She reports intermittent foot pain that is tolerable at this time. She has discontinued Lyrica as of April.  She continues to have ulcers to her feet. She does have wound care. She denies any other health changes.     Interval History 1/17/2020:  The patient returns to clinic today for follow up. She continues to report bilateral foot pain. She describes this pain as burning and tingling in nature. At last visit, we decreased her Lyrica dose to 100 mg at night. She reports increased pain since then. She would like to go to back to the 150 mg dose. She continues to have ulcers to her feet, left worse than right. She continues to participate in a home wound care program. She denies any other health changes. Her pain today is 6/10.    Interval History 12/17/2019:  The patient returns to clinic today for follow up. She reports improving foot pain. She reports improved healing ulcers to her left foot. She continues to report right foot pain that is burning and tingling in nature. She continues to participate in a home wound care routine. She continues to take Lyrica. She asks about decreasing this. She denies any other health changes. Her pain today is 5/10.    Interval History 9/17/2019:  The patient returns to clinic today for follow up. She continues to report bilateral foot pain that is burning and tingling in nature. Her pain is worse with sitting and at night. She continues to have slow healing ulcers to both feet. She continues to perform a home wound care program. She is no longer taking Gabapentin which was previously called in. She is now taking Pregabalin generic with benefit. She denies any other health changes. Her pain today is 4/10.    Interval History 6/13/2019:  The patient returns to clinic for follow up for bilateral foot pain. She continues to report bilateral foot pain that is burning in nature. She continues to have slow healing wounds to both feet from ulcers. She continues to take Lyrica once daily but reports increased pain. She continues to take Vitamin B12. She denies any other health changes. Her  pain today is 8/10.    Interval History 3/13/2019:  The patient returns to clinic today for follow up. She continues to report bilateral foot pain that is burning and tingling in nature. Her pain is worse with prolonged walking and standing. She continues to have bilateral foot wounds. She reports that these wounds have significantly improved since last visit. She is currently taking Vitamin B12 with benefit. She continues to report benefit with Lyrica. She is currently taking this once daily. She denies any other health changes. Her pain today is 5/10.    Interval History 2/6/2019:  The patient returns to clinic today for follow up. She reports that her bilateral foot wounds have significantly improved since last visit. She does report some significant improvement in her foot pain. She reports intermittent bilateral foot pain especially with prolonged walking. She describes this pain as heavy and tingling in nature. She is no longer taking Celebrex. She is currently taking Lyrica 150 mg BID with benefit. She does report that the Lyrica is expensive for her. She denies any other health changes. Her pain today is 5/10.    Interval History 12/5/18:  Patient returns to clinic today for follow up. She reports that since increasing her medications, she is having significant improvement in pain during the day time. She is currently taking Lyrica 75mg TID and Celebrex 200mg TID. However, she states that the burning  And tingling pain in both her feet increase in the evening around 6 or 7pm. She is unable to sleep during the nights due to the pain. She is still wearing her bilateral boots due to non healing ulcers from her scleroderma.     Interval History 8/30/2018:  The patient returns to clinic today for follow up. She continues to report bilateral foot pain that is burning and tingling in nature. She reports that this pain is worse at night. She is currently taking Lyrica 75 mg BID with limited relief. She did not have  any benefit with Mobic. She continues to take Aleve with some benefit. She continues to have nonhealing ulcers. She wears an ACE bandage to her right calf, as well as boots to her bilateral feet. She denies any other health changes. Her pain today is 7/10.     Interval History 7/11/2018:  Since previous encounter she has weaned from gabapentin to 600mg / day and did not notice significant worsening of pain, but was having somnelence from higher doses of the medication in the past.  We are weaning in an attempt to trial Lyrica as an alternative to gabapentin.  Tramadol causes significant sedation, limiting its use.  She has discontinued the use of the tramadol.  Additionally she does have benefit from anti-inflammatory medication, has been using aleve, has not trialed CANTRELL-2 inhibitors in the past.    Interval history 05/16/2018:      The patient has had x-rays of the lumbar spine which did not reveal any evidence of significant neuroforaminal stenosis with degenerative disc disease.  There is mild facet arthropathy.  She has escalated her gabapentin and is currently taking 2100 mg of gabapentin per day without any noticeable improvement but daytime somnolence.  She continues to have nonhealing ulcers and topical pain cream is helping to a limited degree over her leg in areas where there is no skin disruption.    Initial encounter:    Yamel Shah presents to the clinic for the evaluation of foot pain. The pain started 2 years ago following ulcers and symptoms have been worsening.    Brief history: History of scleroderma    Pain Description:    The pain is located in the both feet in the area of slowly healing chronic foot ulcers which were partly from venous insufficiency and stasis associated with her scleroderma.  In her right lower extremity she describes radicular symptoms in the L4/5 distribution.    At BEST  5/10     At WORST  10/10 on the WORST day.      On average pain is rated as 8/10.     Today  the pain is rated as 8/10    The pain is described as burning, sharp, shooting and constant       Symptoms interfere with daily activity, sleeping and work.     Exacerbating factors: Laying, Walking, Night Time, Morning and Getting out of bed/chair.      Mitigating factors medications.     Patient denies night fever/night sweats, urinary incontinence, bowel incontinence, significant weight loss, significant motor weakness and loss of sensations.  Patient denies any suicidal or homicidal ideations    Pain Medications:  Current:  Lyrica 300 mg daily  Zanaflex  Relafen  Tylenol #3    Tried in Past:  NSAIDs -Aleve, Mobic, Celebrex  TCA -Never  SNRI -Never  Anti-convulsants - Gabapentin, Lyrica  Muscle Relaxants -Never  Opioids-Tramadol    Physical Therapy/Home Exercise: no       report:  Reviewed and consistent with medication use as prescribed.    Pain Procedures: none    Chiropractor -never  Acupuncture - never  TENS unit -never  Spinal decompression -never  Joint replacement -never    Imaging:   Xray Cervical Spine 12/6/2019:  FINDINGS:  Odontoid prevertebral soft tissues and posterior elements are intact.  Neural foramina are patent.  No fracture dislocation bone destruction seen.  There is mild DJD.     Impression:     Mild DJD.    X-ray lumbar spine 6/1/2016:  2 views: Alignment is normal. There is mild DJD. No fracture dislocation bone destruction seen.   Impression      Mild DJD.     Xray lumbar spine 4/2018:  COMPARISON:  June 2016    FINDINGS:  There is slight curvature of the lumbar spine.  The vertebral bodies are normally aligned and normal in height.  Mild disc space narrowing present at L5-S1.  There is mild facet degenerative change in the lower spine.  No significant osteophytic spurring present.  There is no change in alignment with flexion or extension.      Past Medical History:   Diagnosis Date    Abnormal Pap smear     Acid reflux     Allergy     Arthritis     Encounter for blood transfusion      GERD (gastroesophageal reflux disease)     Hiatal hernia 03/24/2023    History of migraine headaches     Hx of colonic polyp     Hypertension     Idiopathic neuropathy 07/20/2012    ILD (interstitial lung disease) 11/06/2013    Iron deficiency anemia 03/18/2014    MRSA carrier     Osteopenia     Pneumonia     Pulmonary fibrosis     Pulmonary hypertension     Raynaud's disease     Scleroderma, diffuse     Sjogren's syndrome     Vitamin D deficiency 11/14/2013     Past Surgical History:   Procedure Laterality Date    24 HOUR IMPEDANCE PH MONITORING OF ESOPHAGUS IN PATIENT NOT TAKING ACID REDUCING MEDICATIONS N/A 03/04/2020    Procedure: IMPEDANCE PH STUDY, ESOPHAGEAL, 24 HOUR, IN PATIENT NOT TAKING ACID REDUCING MEDICATION;  Surgeon: Annamaria Mendoza MD;  Location: [facility] (4TH FLR);  Service: Endoscopy;  Laterality: N/A;  OFF PPI/H2 Blocker   Motility Studies   Hold Narcotics x 1 days   Hold TCA x 1 days  2/26 - LVM attempting to confirm appt  2/27 - Confirmed appt    ANORECTAL MANOMETRY N/A 05/10/2021    Procedure: MANOMETRY, ANORECTAL with balloon expulsion test;  Surgeon: Annamaria Mendoza MD;  Location: [facility] (4TH FLR);  Service: Endoscopy;  Laterality: N/A;  order combined  covid test 5/7 [employer], instructions emailed-[facility]    BREAST BIOPSY      Left, benign    BRONCHOSCOPY N/A 10/2/2023    Procedure: bronch;  Surgeon: Roberta Liegh DO;  Location: [facility] OR 2ND FLR;  Service: Endoscopy;  Laterality: N/A;    CERVICAL CONIZATION   W/ LASER  1970    COLONOSCOPY      COLONOSCOPY N/A 03/29/2019    Procedure: COLONOSCOPY;  Surgeon: Annamaria Mendoza MD;  Location: [facility] ENDO (2ND FLR);  Service: Endoscopy;  Laterality: N/A;    COLONOSCOPY N/A 05/10/2021    Procedure: COLONOSCOPY;  Surgeon: Annamaria Mendoza MD;  Location: [facility] ENDO (4TH FLR);  Service: Endoscopy;  Laterality: N/A;  2nd floor-previous scopes done on 2nd floor, gastroparesis  full liquid diet x2 days, clear liquid x1 day prior to  procedure  covid test 5/7 [employer], instructions emailed-[facility]  5/6 pt confirmed appt-[facility]    DILATION AND CURETTAGE OF UTERUS      ESOPHAGEAL MANOMETRY WITH MEASUREMENT OF IMPEDANCE N/A 03/04/2020    Procedure: MANOMETRY, ESOPHAGUS, WITH IMPEDANCE MEASUREMENT;  Surgeon: Annamaria Mendoza MD;  Location: [facility] ENDO (4TH FLR);  Service: Endoscopy;  Laterality: N/A;  OFF PPI/H2 Blocker   Motility Studies   Hold Narcotics x 1 days   Hold TCA x 1 days    ESOPHAGOGASTRODUODENOSCOPY      ESOPHAGOGASTRODUODENOSCOPY N/A 03/29/2019    Procedure: EGD (ESOPHAGOGASTRODUODENOSCOPY);  Surgeon: Annamaria Mendoza MD;  Location: [facility] ENDO (2ND FLR);  Service: Endoscopy;  Laterality: N/A;  pulmonary htn    ESOPHAGOGASTRODUODENOSCOPY N/A 02/02/2021    Procedure: ESOPHAGOGASTRODUODENOSCOPY (EGD);  Surgeon: Annamaria Mendoza MD;  Location: [facility] ENDO (2ND FLR);  Service: Endoscopy;  Laterality: N/A;  2nd floor gastroparesis  3 days full liquid diet and 1 day clears  covid test 1/30 primary care, instructions sent to [facility]    ESOPHAGOGASTRODUODENOSCOPY N/A 07/01/2022    Procedure: ESOPHAGOGASTRODUODENOSCOPY (EGD);  Surgeon: Annamaria Mendoza MD;  Location: [facility] ENDO (2ND FLR);  Service: Endoscopy;  Laterality: N/A;  2nd floor-gastroparesis  full liquid diet x3 days, clear liquid diet x1 day prior to procedure  fully vaccinated, instructions sent to [facility]  6/29-pt confirmed new arrival time-\[facility] [place] [facility]\""    EXCISION, LESION, STOMACH, LAPAROSCOPIC N/A 03/23/2023    Procedure: XI ROBOTIC EXCISION, LESION, STOMACH;  Surgeon: Katherine Segura MD;  [facility];  Service: General;  Laterality: N/A;    EXCISIONAL BIOPSY, LYMPH NODE Right 05/26/2023    Procedure: EXCISIONAL BIOPSY, LYMPH NODE, INGUINAL;  Surgeon: Katherine Segura MD;  [facility];  Service: General;  Laterality: Right;    HYSTERECTOMY  1990    FRANDY (AUB, Fibroids), ovaries remain    REPAIR, HERNIA, UMBILICAL N/A 03/23/2023    Procedure:  REPAIR, HERNIA, UMBILICAL;  Surgeon: Katherine Segura MD;  Location: [facility] OR 2ND FLR;  Service: General;  Laterality: N/A;    RIGHT HEART CATHETERIZATION Right 01/05/2021    Procedure: INSERTION, CATHETER, RIGHT HEART;  Surgeon: Aureliano Sy MD;  Location: [facility] CATH LAB;  Service: Cardiology;  Laterality: Right;    RIGHT HEART CATHETERIZATION Right 03/16/2023    Procedure: INSERTION, CATHETER, RIGHT HEART;  Surgeon: Aureliano Sy MD;  Location: [facility] CATH LAB;  Service: Cardiology;  Laterality: Right;    ROBOT-ASSISTED LAPAROSCOPIC REPAIR OF INGUINAL HERNIA USING DA VERNELL XI Right 05/26/2023    Procedure: XI ROBOTIC REPAIR, HERNIA, INGUINAL, FEMORAL;  Surgeon: Katherine Segura MD;  [facility];  Service: General;  Laterality: Right;    ROBOT-ASSISTED REPAIR OF HIATAL HERNIA USING DA VERNELL XI N/A 03/23/2023    Procedure: XI ROBOTIC REPAIR, HERNIA, HIATAL;  Surgeon: Katherine Segura MD;  [facility];  Service: General;  Laterality: N/A;    VARICOSE VEIN SURGERY      XI ROBOTIC GASTROPEXY N/A 03/23/2023    Procedure: XI ROBOTIC GASTROPEXY;  Surgeon: Katherine Segura MD;  [facility];  Service: General;  Laterality: N/A;    XI ROBOTIC PYLOROMYOTOMY N/A 03/23/2023    Procedure: XI ROBOTIC PYLOROMYOTOMY;  Surgeon: Katherine Segura MD;  [facility];  Service: General;  Laterality: N/A;     Social History     Socioeconomic History    Marital status:      Spouse name: Lewis    Number of children: 4   Occupational History    Occupation: -retired     Employer: [employer]     Comment: [place] [employer]     Employer: RETIRED   Tobacco Use    Smoking status: Never     Passive exposure: Never    Smokeless tobacco: Never   Substance and Sexual Activity    Alcohol use: Yes     Comment: wine occasionally    Drug use: No    Sexual activity: Yes     Partners: Male     Birth control/protection:  Post-menopausal, None, See Surgical Hx     Comment: Hysterectomy   Other Topics Concern    Are you pregnant or think you may be? No    Breast-feeding No   Social History Narrative         Social Determinants of Health     Financial Resource Strain: Low Risk  (3/26/2024)    Overall Financial Resource Strain (CARDIA)     Difficulty of Paying Living Expenses: Not hard at all   Food Insecurity: No Food Insecurity (3/26/2024)    Hunger Vital Sign     Worried About Running Out of Food in the Last Year: Never true     Ran Out of Food in the Last Year: Never true   Transportation Needs: No Transportation Needs (3/26/2024)    PRAPARE - Transportation     Lack of Transportation (Medical): No     Lack of Transportation (Non-Medical): No   Physical Activity: Insufficiently Active (3/26/2024)    Exercise Vital Sign     Days of Exercise per Week: 1 day     Minutes of Exercise per Session: 10 min   Stress: No Stress Concern Present (3/26/2024)    [place] [employer] - Occupational Stress Questionnaire     Feeling of Stress : Only a little   Social Connections: Unknown (3/26/2024)    Social Connection and Isolation Panel [NHANES]     Frequency of Communication with Friends and Family: Three times a week     Frequency of Social Gatherings with Friends and Family: Patient declined     Active Member of Clubs or Organizations: No     Attends Club or Organization Meetings: Never     Marital Status:    Housing Stability: Low Risk  (3/26/2024)    Housing Stability Vital Sign     Unable to Pay for Housing in the Last Year: No     Number of Places Lived in the Last Year: 1     Unstable Housing in the Last Year: No     Family History   Problem Relation Name Age of Onset    Breast cancer Mother Tiki Singh     Cancer Mother Tiki Singh         Breast    Hypertension Father Madi     Diabetes Father Madi         Amputation of legs    Breast cancer Sister Brenda     Diabetes Sister Brenda     Arthritis Sister  Brenda     Cancer Sister Brenda         Breast    Osteoarthritis Brother Luis     Diabetes Brother Luis     Arthritis Brother Luis     Birth defects Brother Luis         Polio at birth, recently had knee replacement surgery on left knee    No Known Problems Daughter      No Known Problems Daughter      No Known Problems Son      No Known Problems Son      Breast cancer Maternal Aunt Gege     Cancer Maternal Aunt Gege         Breast    Early death Sister Philomena     Melanoma Neg Hx      Colon cancer Neg Hx      Crohn's disease Neg Hx      Stomach cancer Neg Hx      Ulcerative colitis Neg Hx      Rectal cancer Neg Hx      Irritable bowel syndrome Neg Hx      Esophageal cancer Neg Hx      Celiac disease Neg Hx      Ovarian cancer Neg Hx      Liver cancer Neg Hx      Pancreatic cancer Neg Hx         Review of patient's allergies indicates:   Allergen Reactions    Sulfa (sulfonamide antibiotics)      Other reaction(s): Rash       Current Outpatient Medications   Medication Sig Dispense Refill    acetaminophen-codeine 300-60mg (TYLENOL #4) 300-60 mg Tab Take 1 tablet by mouth every 4 (four) hours as needed.      albuterol (PROVENTIL) 2.5 mg /3 mL (0.083 %) nebulizer solution Take 3 mLs (2.5 mg total) by nebulization every 4 (four) hours as needed for Wheezing or Shortness of Breath (or cough). Rescue 9 mL 0    albuterol (VENTOLIN HFA) 90 mcg/actuation inhaler Inhale 2 puffs into the lungs every 4 (four) hours as needed for Wheezing. Rescue 18 g 3    albuterol (VENTOLIN HFA) 90 mcg/actuation inhaler Inhale 2 puffs into the lungs every 6 (six) hours as needed for Wheezing. Rescue 8 g 0    albuterol sulfate 2.5 mg/0.5 mL Nebu Take 2.5 mg by nebulization every 4 (four) hours as needed (shortness of breath/wheezing). Rescue 30 each 0    carboxymethylcellulose sodium (REFRESH TEARS) 0.5 % Drop Apply 1-2 drops to every as needed      ergocalciferol (ERGOCALCIFEROL) 50,000 unit Cap TAKE 1 CAPSULE BY MOUTH EVERY 7 DAYS  12 capsule 1    furosemide (LASIX) 20 MG tablet Take 1 tablet (20 mg total) by mouth once daily. 30 tablet 5    gabapentin (NEURONTIN) 300 MG capsule Take 300 mg by mouth 2 (two) times daily.      multivitamin capsule Take 1 capsule by mouth once daily.      mycophenolate mofetil (CELLCEPT) 200 mg/mL SusR Take 5 mLs (1,000 mg total) by mouth 2 (two) times daily. 480 mL 1    nabumetone (RELAFEN) 750 MG tablet Take 1 tablet (750 mg total) by mouth daily as needed for Pain. 30 tablet 2    nebulizer and compressor Rosemary Use as directed 1 each 0    NIFEdipine (ADALAT CC) 90 MG TbSR Take 1 tablet (90 mg total) by mouth nightly. TAKE 1 TABLET(90 MG) BY MOUTH EVERY DAY (TAKES NIGHTLY WITH 30 MG TABLET TOTAL 120 MG) 90 tablet 3    NIFEdipine (PROCARDIA-XL) 30 MG (OSM) 24 hr tablet TAKE 1 TABLET(30 MG) BY MOUTH EVERY DAY 30 tablet 11    nintedanib (OFEV) 100 mg Cap Take 100 mg by mouth 2 (two) times a day. 60 capsule 11    pravastatin (PRAVACHOL) 20 MG tablet TAKE 1 TABLET(20 MG) BY MOUTH EVERY EVENING 90 tablet 0    pregabalin (LYRICA) 300 MG Cap Take 1 capsule (300 mg total) by mouth 2 (two) times daily. 60 capsule 6    PREVIDENT 5000 BOOSTER PLUS 1.1 % Pste SMARTSIG:To Teeth      PREVIDENT 5000 SENSITIVE 1.1-5 % Pste BRUSH FOR 2 MINUTES TWICE PER DAY      RABEprazole (ACIPHEX) 20 mg tablet Take 1 tablet (20 mg total) by mouth 2 (two) times daily. 60 tablet 11    tadalafil (ADCIRCA) 20 mg Tab Take 2 tablets (40 mg total) by mouth once daily. (Patient taking differently: Take 40 mg by mouth every evening.) 60 tablet 11    tiZANidine (ZANAFLEX) 4 MG tablet Take 2 tablets (8 mg total) by mouth nightly as needed (muscle pain). 60 tablet 3     No current facility-administered medications for this visit.     Facility-Administered Medications Ordered in Other Visits   Medication Dose Route Frequency Provider Last Rate Last Admin    fentaNYL 50 mcg/mL injection 25 mcg  25 mcg Intravenous Q5 Min Khadijah Mera DO         "haloperidol lactate injection 0.5 mg  0.5 mg Intravenous Q10 Min PRN Khadijah Thapa,         sodium chloride 0.9% flush 10 mL  10 mL Intravenous PRN Khadijah Thapa DO           REVIEW OF SYSTEMS:    GENERAL:  No weight loss, malaise or fevers.  HEENT:   No recent changes in vision or hearing  NECK:  Negative for lumps,  difficulty with swallowing associated with scleroderma.  RESPIRATORY:  Negative for cough, wheezing or shortness of breath, patient denies any recent URI. Albuterol (history of ILD)  CARDIOVASCULAR:  Negative for chest pain, leg swelling or palpitations.  GI:  Negative for abdominal discomfort, blood in stools or black stools or change in bowel habits. GERD controlled zantac  MUSCULOSKELETAL:  See HPI.  SKIN:   Chronic low healing venous stasis ulcerations to bilateral lower extremities   PSYCH:  No mood disorder or recent psychosocial stressors.  Patients sleep is not disturbed secondary to pain.  HEMATOLOGY/LYMPHOLOGY:   History of iron deficiency anemia, takes daily iron supplementation.    ENDO: No history of diabetes or thyroid dysfunction  NEURO:   No history of headaches, syncope, paralysis, seizures or tremors.  All other reviewed and negative other than HPI.    OBJECTIVE:      /62 (BP Location: Right arm, Patient Position: Sitting, BP Method: Medium (Automatic))   Pulse 80   Resp 12   Ht 5' 5" (1.651 m)   Wt 68 kg (149 lb 14.6 oz)   SpO2 100%   BMI 24.95 kg/m²     PHYSICAL EXAMINATION:    GENERAL: Well appearing, in no acute distress, alert and oriented x3.  PSYCH:  Mood and affect appropriate.  SKIN: Tight skin associated with scleroderma. Wearing shoes today.   HEAD/FACE:  Normocephalic, atraumatic. Cranial nerves grossly intact.  CV: RRR with palpation of the radial artery.  PULM: No evidence of respiratory difficulty, symmetric chest rise.  EXTREMITIES: Contractures over on bilateral hands with ulcerations to knuckles.   MUSCULOSKELETAL:   Bilateral lower extremity " strength testing limited secondary to pain in the feet.    NEURO:  Decreased sensation to BLE.   GAIT: Antalgic, ambulates without assistance       ASSESSMENT: 73 y.o. year old female with pain, consistent with the following:    Encounter Diagnoses   Name Primary?    Chronic pain disorder Yes    Cutaneous scleroderma     Encounter for monitoring opioid maintenance therapy     Idiopathic neuropathy        PLAN:     - Previous records and imaging reviewed.    - Continue Lyrica 300 mg BID.   - Continue Relafen.   - Continue Zanaflex 8 mg at bedtime.   - Pain Contract signed 8/26/2022.   - Continue Tylenol #3 once daily PRN. Refill provided.  - The patient is here today for a refill of current pain medications and they believe these provide effective pain control and improvements in quality of life.  The patient notes no serious side effects, and feels the benefits outweigh the risks.  The patient was reminded of the pain contract that they signed previously as well as the risks and benefits of the medication including possible death.  The updated [place] Board of Pharmacy prescription monitoring program was reviewed, and the patient has been found to be compliant with current treatment plan. Medication management provided by Dr. Hinson.   - UDS on 1/26/2024 reviewed and consistent.   - Continue home exercise routine.   - RTC in 3 months or sooner if needed.    The above plan and management options were discussed at length with patient. Patient is in agreement with the above and verbalized understanding.     Mohit Nelson M.D.  PGY-5  Interventional Pain Management Fellow  [facility]  Pager: [phone]    04/29/2024    I spent a total of 30 minutes on the day of the visit.  This includes face to face time and non-face to face time preparing to see the patient by reviewing previous labs/imaging, obtaining and/or reviewing separately obtained history, documenting clinical information in the  electronic or other health record, independently interpreting results and communicating results to the patient/family/caregiver.    Sree Hinson

## 2024-04-29 NOTE — PROGRESS NOTES
Subjective:    Patient ID:  Yamel Shah is a 73 y.o. female who presents for follow-up of Pulmonary Hypertension.    HPI   Mrs. Shah was diagnosed with scleroderma in 1983, followed by Dr. Lim (and previously Dr. Boudreaux). She was on revatio in 2013, then switched to adcirca 40mg po daily, which she has remained on every since. Last clinic visit was February 2023. RHC in March, 2023 shows normal PA pressures, normal filling pressures, normal CO/CI. She is followed closely by Rheum and ALD/lung transplant.    Mrs. Shah feels okay today, but tired. She endorses leg swelling, and has her L leg wrapped with an ace wrap. 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 Does have chronic wounds on her legs for which she uses A&D ointment or aquaphor. Despite foot pain, she walked 467m today and maintained O2 sat. Patient denies chest pain, chest pressure, syncope, pre-syncope, lightheadedness, dizziness, PND, orthopnea, abdominal pain, abdominal pressure, or N/V/F/C.    6MWT:   3/4/2024 4/29/2024   6MW     6MWT Status completed without stopping  completed without stopping    Patient Reported Other (Comment)  Dyspnea;Leg pain    Was O2 used? No  No    6MW Distance walked (feet) 1562 feet  1532 feet    Distance walked (meters) 476.1 meters  466.95 meters    Did patient stop? No  No    Oxygen Saturation 100 %  99 %    Supplemental Oxygen Room Air  Room Air    Heart Rate 77 bpm  77 bpm    Blood Pressure 134/88  133/66    Ju Dyspnea Rating  light  moderate    Oxygen Saturation 96 %  97 %    Supplemental Oxygen Room Air  Room Air    Heart Rate 111 bpm  98 bpm    Blood Pressure 156/80  167/63    Unable to obtain     Ju Dyspnea Rating  somewhat heavy  somewhat heavy    Recovery Time (seconds) 121 seconds  157 seconds    Oxygen Saturation 100 %  100 %    Supplemental Oxygen Room Air     Heart Rate 84 bpm  88 bpm        ECHO: 3/15/2024    Left  Ventricle: The left ventricle is normal in size. Normal wall thickness. Normal wall motion. There is normal systolic function with a visually estimated ejection fraction of 55 - 60%. There is normal diastolic function.    Right Ventricle: Normal right ventricular cavity size. Wall thickness is normal. Right ventricle wall motion  is normal. Systolic function is normal.    The left atrium is moderately dilated.    Tricuspid Valve: There is mild regurgitation.    Pulmonary Artery: The estimated pulmonary artery systolic pressure is 43 mmHg.    IVC/SVC: Intermediate venous pressure at 8 mmHg.    ECHO: 8/17/2023  Left Ventricle: The left ventricle is normal in size. Normal wall thickness. Normal wall motion. There is normal systolic function with a visually estimated ejection fraction of 60 - 65%. There is normal diastolic function.    Right Ventricle: Normal right ventricular cavity size. Wall thickness is normal. Right ventricle wall motion  is normal. Systolic function is normal.    Pulmonary Artery: The estimated pulmonary artery systolic pressure is 40 mmHg.    RHC: 3/16/2023  RA: 7/ 5/ 5 RV: 35/ 1/ 6 PA: 35/ 10/ 18 PWP: 16/ 16/ 10 .   Cardiac output was 6.9. Cardiac index is 3.9 L/min/m2.   O2 Sat: PA 74%.   Pulmonary vascular resistance: 1.1 JOLLEY.     SaO2:  95%  BSA 1.8 m2  HGB 12 g/dl.    PFTs:    9/18/2023 3/4/2024   Pulmonary Function Tests     FVC 1.76 liters  1.81 liters    FEV1 1.46 liters  1.43 liters    TLC (liters) 2.7 liters  3.28 liters    DLCO (ml/mmHg sec) 9.15 ml/mmHg sec  10.62 ml/mmHg sec    FVC% 66.1  63    FEV1% 70.5  64    FEF 25-75 1.64  1.32    FEF 25-75% 93.5  72    TLC% 52.8  64    RV 0.94  1.33    RV% 44.2  62    DLCO% 42.1  49        Review of Systems   Constitutional: Negative.   HENT: Negative.     Eyes: Negative.    Cardiovascular:  Positive for dyspnea on exertion and leg swelling. Negative for chest pain, near-syncope and syncope.   Respiratory:  Positive for cough.    Endocrine:  "Negative.    Hematologic/Lymphatic: Negative.    Skin: Negative.    Musculoskeletal: Negative.    Gastrointestinal: Negative.    Neurological: Negative.    Psychiatric/Behavioral: Negative.          Objective: BP (!) 145/64 (BP Location: Left arm, Patient Position: Sitting, BP Method: Medium (Manual))   Pulse 82   Ht 5' 5" (1.651 m)   Wt 68.4 kg (150 lb 12.7 oz)   SpO2 99%   BMI 25.09 kg/m²     Physical Exam  Constitutional:       General: She is not in acute distress.     Appearance: Normal appearance. She is not ill-appearing.   HENT:      Head: Normocephalic.   Cardiovascular:      Rate and Rhythm: Normal rate and regular rhythm.      Pulses: Normal pulses.      Heart sounds: Normal heart sounds.   Pulmonary:      Effort: Pulmonary effort is normal.   Abdominal:      Palpations: Abdomen is soft.   Musculoskeletal:         General: Normal range of motion.      Cervical back: Normal range of motion.      Right lower leg: Edema present.      Left lower leg: Edema present.   Skin:     General: Skin is dry.      Capillary Refill: Capillary refill takes less than 2 seconds.      Comments: Tight skin, sclerotic changes   Neurological:      Mental Status: She is oriented to person, place, and time.   Psychiatric:         Mood and Affect: Mood normal.         Behavior: Behavior normal.           Lab Results   Component Value Date    BNP 80 04/29/2024     04/29/2024    K 3.8 04/29/2024    MG 1.8 04/29/2024     04/29/2024    CO2 23 04/29/2024    BUN 13 04/29/2024    CREATININE 0.7 04/29/2024     04/29/2024    HGBA1C 5.2 10/19/2023    AST 21 04/29/2024    ALT 12 04/29/2024    ALBUMIN 3.1 (L) 04/29/2024    PROT 8.1 04/29/2024    BILITOT 0.3 04/29/2024    CHOL 129 10/19/2023    HDL 47 10/19/2023    LDLCALC 71.6 10/19/2023    TRIG 52 10/19/2023       Magnesium   Date Value Ref Range Status   04/29/2024 1.8 1.6 - 2.6 mg/dL Final       Lab Results   Component Value Date    WBC 5.41 04/29/2024    HGB 10.6 " (L) 04/29/2024    HCT 32.8 (L) 04/29/2024    MCV 86 04/29/2024     04/29/2024       Lab Results   Component Value Date    INR 1.0 06/29/2015    INR 1.0 01/21/2013    INR 1.0 09/29/2007       BNP   Date Value Ref Range Status   04/29/2024 80 0 - 99 pg/mL Final     Comment:     Values of less than 100 pg/ml are consistent with non-CHF populations.   03/15/2024 81 0 - 99 pg/mL Final     Comment:     Values of less than 100 pg/ml are consistent with non-CHF populations.   02/27/2024 53 0 - 99 pg/mL Final     Comment:     Values of less than 100 pg/ml are consistent with non-CHF populations.       LD   Date Value Ref Range Status   04/11/2022 184 110 - 260 U/L Final     Comment:     Results are increased in hemolyzed samples.   03/25/2021 149 110 - 260 U/L Final     Comment:     Results are increased in hemolyzed samples.   11/24/2020 142 110 - 260 U/L Final     Comment:     Results are increased in hemolyzed samples.         WHO Group: 1  Functional Class: II  REVEAL Score: 4 (Low Risk)  Assessment:       1. WHO group 1 pulmonary arterial hypertension    2. Pulmonary hypertension    3. Shortness of breath    4. ILD (interstitial lung disease)    5. Venous insufficiency of both lower extremities    6. Scleroderma with pulmonary involvement    7. Immunosuppression due to drug therapy    8. Iron deficiency anemia, unspecified iron deficiency anemia type         Plan:   Mrs. Shah reports baseline symptoms. Breathing is better since recovering from respiratory infection. REVEAL score is low on monotherapy. Would usually have patients on dual therapy, but so far has been very stable with mPAP 18, PVR 1.1 on last RHC. Have discussed needing a new RHC later this year.    Mrs. Shah is reporting more leg swelling but it does resolve with compression and elevation. She is willing to try the whole lasix tablet. She refuses wound care (has seen them in the past)  and takes care of her wounds herself.     Will address  low iron with iron infusion. She has an appt with GI in a few weeks. Denies GI symptoms.    Increase furosemide to a whole tablet (20 mg) once daily.    Will schedule iron infusions in our iron infusion suite.    Return to clinic 4 months with labs, walk.    Recommend 2 gram sodium restriction and 1500cc fluid restriction.  Encourage physical activity with graded exercise program.  Requested patient to weigh themselves daily, and to notify us if their weight increases by more than 3 lbs in 1 day or 5 lbs in 1 week.    Claire Pelaez, ROBBY, APRN  Pulmonary Hypertension Department      [facility]  Dr. Ki Pelaez DNP, APRN  Katherine Aguirre, BSN, RN

## 2024-04-29 NOTE — PATIENT INSTRUCTIONS
Increase furosemide to a whole tablet (20 mg) once daily.    Will schedule iron infusions in our iron infusion suite.    Return to clinic 4 months with labs, walk.    Recommend 2 gram sodium restriction and 1500cc fluid restriction.  Encourage physical activity with graded exercise program.  Requested patient to weigh themselves daily, and to notify us if their weight increases by more than 3 lbs in 1 day or 5 lbs in 1 week.

## 2024-04-29 NOTE — PROCEDURES
Yamel Shah is a 73 y.o.  female patient, who presents for a 6 minute walk test ordered by SEAN Pelaez.  The diagnosis is Interstitial Lung Disease; Scleroderma/CREST, Pulmonary Hypertension.  The patient's BMI is 25 kg/m2.  Predicted distance (lower limit of normal) is 302.24 meters.      Test Results:    The test was completed without stopping.  The total time walked was 360 seconds.  During walking, the patient reported:  Dyspnea, Leg pain. The patient used no assistive devices during testing.  Oxygen saturation was measured with forehead pulse oximetry probe.    04/29/2024---------Distance: 466.95 meters (1532 feet)     O2 Sat % Supplemental Oxygen Heart Rate Blood Pressure Ju Scale   Pre-exercise  (Resting) 99 % Room Air 77 bpm 133/66 mmHg 3   During Exercise 97 % Room Air 98 bpm 167/63 mmHg 4   Post-exercise  (Recovery) 100 % Room Air   88 bpm 150/56 mmHg      Recovery Time: 157 seconds    Performing nurse/tech: KAYLEY Tucker      PREVIOUS STUDY:   03/04/2024---------Distance: 476.1 meters (1562 feet)       O2 Sat % Supplemental Oxygen Heart Rate Blood Pressure Ju Scale   Pre-exercise  (Resting) 100 % Room Air 77 bpm 134/88 mmHg 2   During Exercise 96 % Room Air 111 bpm 156/80 mmHg 4   Post-exercise  (Recovery) 100 % Room Air  84 bpm 138/89 mmHg           CLINICAL INTERPRETATION:  Six minute walk distance is 466.95 meters (1532 feet) with somewhat heavy dyspnea.  During exercise, there was no significant desaturation while breathing room air.  Both blood pressure and heart rate increased significantly with walking.  The patient reported non-pulmonary symptoms during exercise.  Since the previous study in March 2024, exercise capacity is unchanged.  Based upon age and body mass index, exercise capacity is normal.

## 2024-04-30 ENCOUNTER — TELEPHONE (OUTPATIENT)
Dept: TRANSPLANT | Facility: CLINIC | Age: 74
End: 2024-04-30
Payer: MEDICARE

## 2024-04-30 NOTE — TELEPHONE ENCOUNTER
----- Message from Roberta Leigh DO sent at 4/30/2024  8:30 AM CDT -----  Looks good no changes to either  ----- Message -----  From: Johanna Fernandez  Sent: 4/29/2024   3:37 PM CDT  To: Roberta Leigh DO    On [employer] / [employer]. Routine monitoring.

## 2024-05-02 ENCOUNTER — OFFICE VISIT (OUTPATIENT)
Dept: TRANSPLANT | Facility: CLINIC | Age: 74
End: 2024-05-02
Payer: MEDICARE

## 2024-05-02 VITALS
SYSTOLIC BLOOD PRESSURE: 138 MMHG | WEIGHT: 150 LBS | HEART RATE: 77 BPM | DIASTOLIC BLOOD PRESSURE: 63 MMHG | TEMPERATURE: 99 F | OXYGEN SATURATION: 99 % | HEIGHT: 65 IN | BODY MASS INDEX: 24.99 KG/M2

## 2024-05-02 DIAGNOSIS — M34.9 SCLERODERMA WITH PULMONARY INVOLVEMENT: Primary | ICD-10-CM

## 2024-05-02 DIAGNOSIS — I27.20 PULMONARY HYPERTENSION: ICD-10-CM

## 2024-05-02 DIAGNOSIS — J84.9 ILD (INTERSTITIAL LUNG DISEASE): ICD-10-CM

## 2024-05-02 DIAGNOSIS — K21.9 GASTROESOPHAGEAL REFLUX DISEASE, UNSPECIFIED WHETHER ESOPHAGITIS PRESENT: ICD-10-CM

## 2024-05-02 PROCEDURE — 99999 PR PBB SHADOW E&M-EST. PATIENT-LVL III: CPT | Mod: PBBFAC,TXP,, | Performed by: INTERNAL MEDICINE

## 2024-05-02 PROCEDURE — 99214 OFFICE O/P EST MOD 30 MIN: CPT | Mod: S$PBB,NTX,, | Performed by: INTERNAL MEDICINE

## 2024-05-02 PROCEDURE — 99213 OFFICE O/P EST LOW 20 MIN: CPT | Mod: PBBFAC,NTX | Performed by: INTERNAL MEDICINE

## 2024-05-02 NOTE — LETTER
May 2, 2024        Luis Ahuja  [address]  [facility] [address]  Phone: [phone]  Fax: [phone]             [facility] 1st Fl  [address]  [facility] [address]  Phone: [phone]   Patient: Yamel Shah   MR Number: [id number]   YOB: 1950   Date of Visit: 5/2/2024       Dear Dr. Luis Ahuja    Thank you for referring Yamel Shah to me for evaluation. Attached you will find relevant portions of my assessment and plan of care.    If you have questions, please do not hesitate to call me. I look forward to following Yamel Shah along with you.    Sincerely,    Roberta Leigh, DO    Enclosure    If you would like to receive this communication electronically, please contact [email or web address] or [phone] to request [employer] access.    [employer] is a tool which provides read-only access to select patient information with whom you have a relationship. Its easy to use and provides real time access to review your patients record including encounter summaries, notes, results, and demographic information.    If you feel you have received this communication in error or would no longer like to receive these types of communications, please e-mail [email or web address]

## 2024-05-03 ENCOUNTER — TELEPHONE (OUTPATIENT)
Dept: TRANSPLANT | Facility: CLINIC | Age: 74
End: 2024-05-03
Payer: MEDICARE

## 2024-05-03 NOTE — TELEPHONE ENCOUNTER
Results of CMP and CBC drawn on 5/2/24 were reviewed with Dr. Leigh.     ----- Message from Roberta Leigh DO sent at 5/3/2024  2:18 PM CDT -----    No changes    ----- Message -----  From: Magaly León RN  Sent: 5/3/2024   1:31 PM CDT  To: Roberta Leigh DO    Labs for OFEV and MMF monitoring

## 2024-05-03 NOTE — PROGRESS NOTES
ADVANCED LUNG DISEASE CLINIC FOLLOW-UP                                                                                                                                             Reason for Visit:  SSc-ILD    Referring Physician: No ref. provider found    History of Present Illness: Yamel Shah is a 73 y.o. female who is on 0L of oxygen.  She is on no assisted ventilation.  Her New York Heart Association Class is II and a Karnofsky score of 90% - Able to carry on normal activity: minor symptoms of disease. She is not diabetic.    She presents today for routine follow-up.  Her last visit, she had an acute URTI.  Since then, she feels like she is back at her baseline.  Dyspnea is stable with exertion and her cough has resolved.  She is on MMF and OFEV tolerating well.  No further illnesses or hospitalizations.      Past Medical History:   Diagnosis Date    Abnormal Pap smear     Acid reflux     Allergy     Arthritis     Encounter for blood transfusion     GERD (gastroesophageal reflux disease)     Hiatal hernia 03/24/2023    History of migraine headaches     Hx of colonic polyp     Hypertension     Idiopathic neuropathy 07/20/2012    ILD (interstitial lung disease) 11/06/2013    Iron deficiency anemia 03/18/2014    MRSA carrier     Osteopenia     Pneumonia     Pulmonary fibrosis     Pulmonary hypertension     Raynaud's disease     Scleroderma, diffuse     Sjogren's syndrome     Vitamin D deficiency 11/14/2013       Past Surgical History:   Procedure Laterality Date    24 HOUR IMPEDANCE PH MONITORING OF ESOPHAGUS IN PATIENT NOT TAKING ACID REDUCING MEDICATIONS N/A 03/04/2020    Procedure: IMPEDANCE PH STUDY, ESOPHAGEAL, 24 HOUR, IN PATIENT NOT TAKING ACID REDUCING MEDICATION;  Surgeon: Annamaria Mendoza MD;  Location: [facility] ([address] [place] [address]);  Service: Endoscopy;  Laterality: N/A;  OFF PPI/H2 Blocker   Motility Studies   Hold Narcotics x 1 days   Hold TCA x 1 days  2/26 - LVM attempting to confirm appt  2/27 -  Confirmed appt    ANORECTAL MANOMETRY N/A 05/10/2021    Procedure: MANOMETRY, ANORECTAL with balloon expulsion test;  Surgeon: Annamaria Mendoza MD;  Location: [facility] (4TH FLR);  Service: Endoscopy;  Laterality: N/A;  order combined  covid test 5/7 [facility], instructions emailed-KPvt    BREAST BIOPSY      Left, benign    BRONCHOSCOPY N/A 10/2/2023    Procedure: bronch;  Surgeon: Roberta Leigh DO;  Location: [facility] OR 2ND FLR;  Service: Endoscopy;  Laterality: N/A;    CERVICAL CONIZATION   W/ LASER  1970    COLONOSCOPY      COLONOSCOPY N/A 03/29/2019    Procedure: COLONOSCOPY;  Surgeon: Annamaria Mendoza MD;  Location: [facility] (2ND FLR);  Service: Endoscopy;  Laterality: N/A;    COLONOSCOPY N/A 05/10/2021    Procedure: COLONOSCOPY;  Surgeon: Annamaria Mendoza MD;  Location: [facility] (4TH FLR);  Service: Endoscopy;  Laterality: N/A;  2nd floor-previous scopes done on 2nd floor, gastroparesis  full liquid diet x2 days, clear liquid x1 day prior to procedure  covid test 5/7 [facility], instructions emailed-KPvt  5/6 pt confirmed appt-KPvt    DILATION AND CURETTAGE OF UTERUS      ESOPHAGEAL MANOMETRY WITH MEASUREMENT OF IMPEDANCE N/A 03/04/2020    Procedure: MANOMETRY, ESOPHAGUS, WITH IMPEDANCE MEASUREMENT;  Surgeon: Annamaria Mendoza MD;  Location: [facility] (4TH FLR);  Service: Endoscopy;  Laterality: N/A;  OFF PPI/H2 Blocker   Motility Studies   Hold Narcotics x 1 days   Hold TCA x 1 days    ESOPHAGOGASTRODUODENOSCOPY      ESOPHAGOGASTRODUODENOSCOPY N/A 03/29/2019    Procedure: EGD (ESOPHAGOGASTRODUODENOSCOPY);  Surgeon: Annamaria Mendoza MD;  Location: [facility] (2ND FLR);  Service: Endoscopy;  Laterality: N/A;  pulmonary htn    ESOPHAGOGASTRODUODENOSCOPY N/A 02/02/2021    Procedure: ESOPHAGOGASTRODUODENOSCOPY (EGD);  Surgeon: Annamaria Mendoza MD;  Location: [facility] (2ND FLR);  Service: Endoscopy;  Laterality: N/A;  2nd floor gastroparesis  3 days full liquid diet and 1 day clears  covid test 1/30 primary care,  instructions sent to [facility]    ESOPHAGOGASTRODUODENOSCOPY N/A 07/01/2022    Procedure: ESOPHAGOGASTRODUODENOSCOPY (EGD);  Surgeon: Annamaria Mendoza MD;  Location: [facility] (2ND FLR);  Service: Endoscopy;  Laterality: N/A;  2nd floor-gastroparesis  full liquid diet x3 days, clear liquid diet x1 day prior to procedure  fully vaccinated, instructions sent to [facility]  6/29-pt confirmed new arrival time-[facility]    EXCISION, LESION, STOMACH, LAPAROSCOPIC N/A 03/23/2023    Procedure: XI ROBOTIC EXCISION, LESION, STOMACH;  Surgeon: Katherine Segura MD;  Location: [address];  Service: General;  Laterality: N/A;    EXCISIONAL BIOPSY, LYMPH NODE Right 05/26/2023    Procedure: EXCISIONAL BIOPSY, LYMPH NODE, INGUINAL;  Surgeon: Katherine Segura MD;  Location: [address];  Service: General;  Laterality: Right;    HYSTERECTOMY  1990    FRANDY (AUB, Fibroids), ovaries remain    REPAIR, HERNIA, UMBILICAL N/A 03/23/2023    Procedure: REPAIR, HERNIA, UMBILICAL;  Surgeon: Katherine Segura MD;  Location: [address];  Service: General;  Laterality: N/A;    RIGHT HEART CATHETERIZATION Right 01/05/2021    Procedure: INSERTION, CATHETER, RIGHT HEART;  Surgeon: Aureliano Sy MD;  Location: [facility] CATH LAB;  Service: Cardiology;  Laterality: Right;    RIGHT HEART CATHETERIZATION Right 03/16/2023    Procedure: INSERTION, CATHETER, RIGHT HEART;  Surgeon: Aureliano Sy MD;  Location: [facility] CATH LAB;  Service: Cardiology;  Laterality: Right;    ROBOT-ASSISTED LAPAROSCOPIC REPAIR OF INGUINAL HERNIA USING DA VERNELL XI Right 05/26/2023    Procedure: XI ROBOTIC REPAIR, HERNIA, INGUINAL, FEMORAL;  Surgeon: Katherine Segura MD;  Location: [address];  Service: General;  Laterality: Right;    ROBOT-ASSISTED REPAIR OF HIATAL HERNIA USING DA VERNELL XI N/A 03/23/2023    Procedure: XI ROBOTIC REPAIR, HERNIA, HIATAL;  Surgeon: Katherine Segura MD;  Location: [address]  FLR;  Service: General;  Laterality: N/A;    VARICOSE VEIN SURGERY      XI ROBOTIC GASTROPEXY N/A 03/23/2023    Procedure: XI ROBOTIC GASTROPEXY;  Surgeon: Katherine Segura MD;  Location: [facility] OR 2ND FLR;  Service: General;  Laterality: N/A;    XI ROBOTIC PYLOROMYOTOMY N/A 03/23/2023    Procedure: XI ROBOTIC PYLOROMYOTOMY;  Surgeon: Katherine Segura MD;  Location: [facility] OR 2ND FLR;  Service: General;  Laterality: N/A;       Allergies: Spironolactone, Sulfa (sulfonamide antibiotics), and Tramadol    Current Outpatient Medications   Medication Sig Dispense Refill    acetaminophen-codeine 300-30mg (TYLENOL #3) 300-30 mg Tab Take 1 tablet by mouth every evening. 30 tablet 0    acetaminophen-codeine 300-60mg (TYLENOL #4) 300-60 mg Tab Take 1 tablet by mouth every 4 (four) hours as needed.      albuterol (PROVENTIL) 2.5 mg /3 mL (0.083 %) nebulizer solution Take 3 mLs (2.5 mg total) by nebulization every 4 (four) hours as needed for Wheezing or Shortness of Breath (or cough). Rescue 9 mL 0    albuterol (VENTOLIN HFA) 90 mcg/actuation inhaler Inhale 2 puffs into the lungs every 4 (four) hours as needed for Wheezing. Rescue 18 g 3    albuterol (VENTOLIN HFA) 90 mcg/actuation inhaler Inhale 2 puffs into the lungs every 6 (six) hours as needed for Wheezing. Rescue 8 g 0    albuterol sulfate 2.5 mg/0.5 mL Nebu Take 2.5 mg by nebulization every 4 (four) hours as needed (shortness of breath/wheezing). Rescue 30 each 0    carboxymethylcellulose sodium (REFRESH TEARS) 0.5 % Drop Apply 1-2 drops to every as needed      ergocalciferol (ERGOCALCIFEROL) 50,000 unit Cap TAKE 1 CAPSULE BY MOUTH EVERY 7 DAYS 12 capsule 1    furosemide (LASIX) 20 MG tablet Take 1 tablet (20 mg total) by mouth once daily. 30 tablet 5    gabapentin (NEURONTIN) 300 MG capsule Take 300 mg by mouth 2 (two) times daily.      multivitamin capsule Take 1 capsule by mouth once daily.      mycophenolate mofetil (CELLCEPT) 200 mg/mL SusR Take 5 mLs  (1,000 mg total) by mouth 2 (two) times daily. 480 mL 1    nabumetone (RELAFEN) 750 MG tablet Take 1 tablet (750 mg total) by mouth daily as needed for Pain. 30 tablet 2    nebulizer and compressor Rosemary Use as directed 1 each 0    NIFEdipine (ADALAT CC) 90 MG TbSR Take 1 tablet (90 mg total) by mouth nightly. TAKE 1 TABLET(90 MG) BY MOUTH EVERY DAY (TAKES NIGHTLY WITH 30 MG TABLET TOTAL 120 MG) 90 tablet 3    NIFEdipine (PROCARDIA-XL) 30 MG (OSM) 24 hr tablet TAKE 1 TABLET(30 MG) BY MOUTH EVERY DAY 30 tablet 11    nintedanib (OFEV) 100 mg Cap Take 100 mg by mouth 2 (two) times a day. 60 capsule 11    pravastatin (PRAVACHOL) 20 MG tablet TAKE 1 TABLET(20 MG) BY MOUTH EVERY EVENING 90 tablet 0    pregabalin (LYRICA) 300 MG Cap Take 1 capsule (300 mg total) by mouth 2 (two) times daily. 60 capsule 6    pregabalin (LYRICA) 300 MG Cap Take 1 capsule (300 mg total) by mouth 2 (two) times daily. 60 capsule 6    PREVIDENT 5000 BOOSTER PLUS 1.1 % Pste SMARTSIG:To Teeth      PREVIDENT 5000 SENSITIVE 1.1-5 % Pste BRUSH FOR 2 MINUTES TWICE PER DAY      RABEprazole (ACIPHEX) 20 mg tablet Take 1 tablet (20 mg total) by mouth 2 (two) times daily. 60 tablet 11    sars-cov-2, covid-19, (SPIKEVAX, MODERNA,, 12YRS AND UP 2023,) 50 mcg/0.5 mL injection Inject 0.5 mLs into the muscle once. Inject 0.5 mL into the muscle once. for 1 dose 0.5 mL 0    tadalafil (ADCIRCA) 20 mg Tab Take 2 tablets (40 mg total) by mouth once daily. (Patient taking differently: Take 40 mg by mouth every evening.) 60 tablet 11    tiZANidine (ZANAFLEX) 4 MG tablet Take 2 tablets (8 mg total) by mouth 2 (two) times daily as needed (muscle pain). 60 tablet 3     No current facility-administered medications for this visit.     Facility-Administered Medications Ordered in Other Visits   Medication Dose Route Frequency Provider Last Rate Last Admin    fentaNYL 50 mcg/mL injection 25 mcg  25 mcg Intravenous Q5 Min PRN Patdelia, Khadijah, DO        haloperidol lactate  injection 0.5 mg  0.5 mg Intravenous Q10 Min PRN Khadijah Thapa, DO        sodium chloride 0.9% flush 10 mL  10 mL Intravenous PRN Khadijah Thapa, DO           Immunization History   Administered Date(s) Administered    COVID-19 MRNA, LN-S PF (MODERNA HALF 0.25 ML DOSE) 04/21/2022    COVID-19, MRNA, LN-S, PF (MODERNA FULL 0.5 ML DOSE) 02/09/2021, 03/12/2021, 09/21/2021    COVID-19, MRNA, LN-S, PF (Pfizer) (Purple Cap) 10/13/2022    COVID-19, mRNA, LNP-S, PF (Moderna 2023)Ages 12+ 10/18/2023, 05/02/2024    COVID-19, mRNA, LNP-S, bivalent booster, PF (Moderna Omicron)12 + YEARS 10/13/2022, 10/13/2022    Influenza 11/02/2015, 09/21/2021, 09/22/2023    Influenza (FLUAD) - Quadrivalent - Adjuvanted - PF *Preferred* (65+) 09/11/2020, 09/12/2022, 09/22/2023    Influenza - High Dose - PF (65 years and older) 10/09/2017, 10/11/2017, 10/27/2018, 09/09/2019    Influenza - Quadrivalent - PF *Preferred* (6 months and older) 11/03/2006, 10/08/2007, 09/29/2009, 09/26/2014, 09/26/2014, 11/02/2015, 11/02/2015, 09/27/2016    Influenza - Trivalent (ADULT) 11/03/2006, 10/08/2007, 09/29/2009, 09/26/2014    Influenza - Trivalent - PF (ADULT) 11/02/2015    Influenza Split 11/03/2006, 10/08/2007, 09/29/2009, 09/26/2014    Pneumococcal Conjugate - 13 Valent 01/16/2013, 01/16/2013    Pneumococcal Polysaccharide - 23 Valent 09/27/2016, 11/12/2021    RSVpreF (Arexvy) 10/27/2023    Tdap 02/06/2019    Zoster 01/16/2013    Zoster Recombinant 11/30/2018, 02/12/2019     Family History:    Family History   Problem Relation Name Age of Onset    Breast cancer Mother Tiki Singh     Cancer Mother Tiki Singh         Breast    Hypertension Father Madi     Diabetes Father Madi         Amputation of legs    Breast cancer Sister Brenda     Diabetes Sister Brenda     Arthritis Sister Brenda     Cancer Sister Brenda         Breast    Osteoarthritis Brother Luis     Diabetes Brother Luis     Arthritis Brother Luis     Birth defects Brother Luis          Polio at birth, recently had knee replacement surgery on left knee    No Known Problems Daughter      No Known Problems Daughter      No Known Problems Son      No Known Problems Son      Breast cancer Maternal Aunt Gege     Cancer Maternal Aunt Gege         Breast    Early death Sister Philomena     Melanoma Neg Hx      Colon cancer Neg Hx      Crohn's disease Neg Hx      Stomach cancer Neg Hx      Ulcerative colitis Neg Hx      Rectal cancer Neg Hx      Irritable bowel syndrome Neg Hx      Esophageal cancer Neg Hx      Celiac disease Neg Hx      Ovarian cancer Neg Hx      Liver cancer Neg Hx      Pancreatic cancer Neg Hx       Social History     Substance and Sexual Activity   Alcohol Use Not Currently    Comment: wine occasionally      Social History     Substance and Sexual Activity   Drug Use No      Social History     Socioeconomic History    Marital status:      Spouse name: Lewis    Number of children: 4   Occupational History    Occupation: -retired     Employer: [employer]     Comment: [employer]     Employer: RETIRED   Tobacco Use    Smoking status: Never     Passive exposure: Never    Smokeless tobacco: Never   Substance and Sexual Activity    Alcohol use: Not Currently     Comment: wine occasionally    Drug use: No    Sexual activity: Yes     Partners: Male     Birth control/protection: Post-menopausal, None, See Surgical Hx     Comment: Hysterectomy   Other Topics Concern    Are you pregnant or think you may be? No    Breast-feeding No   Social History Narrative         Social Determinants of Health     Financial Resource Strain: Low Risk  (3/26/2024)    Overall Financial Resource Strain (CARDIA)     Difficulty of Paying Living Expenses: Not hard at all   Food Insecurity: No Food Insecurity (3/26/2024)    Hunger Vital Sign     Worried About Running Out of Food in the Last Year: Never true     Ran Out of Food in the Last Year: Never true   Transportation Needs:  No Transportation Needs (3/26/2024)    PRAPARE - Transportation     Lack of Transportation (Medical): No     Lack of Transportation (Non-Medical): No   Physical Activity: Insufficiently Active (3/26/2024)    Exercise Vital Sign     Days of Exercise per Week: 1 day     Minutes of Exercise per Session: 10 min   Stress: No Stress Concern Present (3/26/2024)    [employer] [employer] - Occupational Stress Questionnaire     Feeling of Stress : Only a little   Housing Stability: Low Risk  (3/26/2024)    Housing Stability Vital Sign     Unable to Pay for Housing in the Last Year: No     Number of Places Lived in the Last Year: 1     Unstable Housing in the Last Year: No     Review of Systems   Constitutional:  Negative for chills, diaphoresis, fever, malaise/fatigue and weight loss.   HENT:  Negative for congestion, ear discharge, ear pain, hearing loss, nosebleeds, sinus pain, sore throat and tinnitus.    Eyes:  Negative for blurred vision, double vision, photophobia, pain, discharge and redness.   Respiratory:  Positive for shortness of breath (heavy exertion only). Negative for cough, hemoptysis, sputum production, wheezing and stridor.    Cardiovascular:  Negative for chest pain, palpitations, orthopnea, claudication, leg swelling and PND.   Gastrointestinal:  Positive for heartburn. Negative for abdominal pain, blood in stool, constipation, diarrhea, melena, nausea and vomiting.   Genitourinary:  Negative for dysuria, flank pain, frequency, hematuria and urgency.   Musculoskeletal:  Negative for back pain, falls, joint pain, myalgias and neck pain.   Skin:  Negative for itching and rash.   Neurological:  Negative for dizziness, tingling, tremors, sensory change, speech change, focal weakness, seizures, loss of consciousness, weakness and headaches.   Endo/Heme/Allergies:  Negative for environmental allergies and polydipsia. Does not bruise/bleed easily.   Psychiatric/Behavioral:  Negative for  "depression, hallucinations, memory loss, substance abuse and suicidal ideas. The patient is not nervous/anxious and does not have insomnia.      Vitals  /63 (BP Location: Left arm, Patient Position: Sitting, BP Method: Small (Automatic))   Pulse 77   Temp 98.7 °F (37.1 °C) (Oral)   Ht 5' 5" (1.651 m)   Wt 68 kg (150 lb)   SpO2 99% Comment: room air  BMI 24.96 kg/m²   Physical Exam  Vitals and nursing note reviewed.   Constitutional:       General: She is not in acute distress.     Appearance: She is well-developed. She is not diaphoretic.   HENT:      Head: Normocephalic and atraumatic.      Nose: Nose normal.      Mouth/Throat:      Pharynx: No oropharyngeal exudate.   Eyes:      General: No scleral icterus.     Conjunctiva/sclera: Conjunctivae normal.      Pupils: Pupils are equal, round, and reactive to light.   Neck:      Thyroid: No thyromegaly.      Vascular: No JVD.      Trachea: No tracheal deviation.   Cardiovascular:      Rate and Rhythm: Normal rate and regular rhythm.      Heart sounds: Normal heart sounds. No murmur heard.     No friction rub. No gallop.   Pulmonary:      Effort: Pulmonary effort is normal. No respiratory distress.      Breath sounds: Wheezing and rales present.      Comments: Diffuse expiratory squeaks  Abdominal:      General: Bowel sounds are normal. There is no distension.      Palpations: Abdomen is soft.      Tenderness: There is no abdominal tenderness.   Musculoskeletal:         General: No deformity. Normal range of motion.      Cervical back: Normal range of motion and neck supple.   Skin:     General: Skin is warm and dry.      Capillary Refill: Capillary refill takes less than 2 seconds.      Coloration: Skin is not pale.      Findings: No erythema or rash.      Comments: Skin tightening.  Digital ulcers and nailbed deformities.  Bilateral foot ulcers in dry intact dressing     Neurological:      Mental Status: She is alert and oriented to person, place, and " time.      Cranial Nerves: No cranial nerve deficit.   Psychiatric:         Judgment: Judgment normal.         Labs:          3/4/2024     1:35 PM 9/18/2023    11:06 AM 8/17/2023     1:27 PM 5/23/2023     9:20 AM 10/7/2022    12:56 PM 1/3/2022    10:43 AM 5/21/2021    10:46 AM   Pulmonary Function Tests   FVC 1.81 liters 1.76 liters 1.79 liters 1.9 liters 1.82 liters 1.83 liters 1.85 liters   FEV1 1.43 liters 1.46 liters 1.42 liters 1.58 liters 1.49 liters 1.44 liters 1.48 liters   TLC (liters) 3.28 liters 2.7 liters 3.11 liters 3.41 liters 3.2 liters  3.18 liters   DLCO (ml/mmHg sec) 10.62 ml/mmHg sec 9.15 ml/mmHg sec 10.8 ml/mmHg sec 11.37 ml/mmHg sec 11.89 ml/mmHg sec  14.11 ml/mmHg sec   FVC% 63 66.1 61.8 71.1 67 67 68   FEV1% 64 70.5 63.8 75.6 71 68 69   FEF 25-75 1.32 1.64 1.35 1.82 1.7  1.57   FEF 25-75% 72 93.5 73.2 103.5 95  85   TLC% 64 52.8 60.9 66.8 63  62   RV 1.33 0.94 1.32 1.46 1.33  1.24   RV% 62 44.2 62.2 68.5 63  59   DLCO% 49 42.1 49.7 52.3 54  64         4/29/2024     2:06 PM 3/4/2024    12:40 PM 9/18/2023    10:57 AM 8/17/2023     1:29 PM 5/22/2023     9:20 AM 2/28/2023    12:42 PM 10/7/2022     9:33 AM   6MW   6MWT Status completed without stopping completed without stopping completed without stopping completed without stopping completed without stopping completed without stopping completed without stopping   Patient Reported Dyspnea;Leg pain Other (Comment) Dyspnea Dyspnea No complaints Other (Comment) Dyspnea;Lightheadedness   Was O2 used? No No No No No No No   6MW Distance walked (feet) 1532 feet 1562 feet 1450 feet 1300 feet 1500 feet 1500 feet 1400 feet   Distance walked (meters) 466.95 meters 476.1 meters 441.96 meters 396.24 meters 457.2 meters 457.2 meters 426.72 meters   Did patient stop? No No No No No No No   Oxygen Saturation 99 % 100 % 100 % 98 % 100 % 98 % 98 %   Supplemental Oxygen Room Air Room Air Room Air Room Air Room Air Room Air Room Air   Heart Rate 77 bpm 77 bpm 75 bpm  70 bpm 62 bpm 74 bpm 85 bpm   Blood Pressure 133/66 134/88 152/67 141/63 109/51 133/63 126/61   Ju Dyspnea Rating  moderate light light light moderate moderate moderate   Oxygen Saturation 97 % 96 % 98 % 96 % 98 % 94 % 97 %   Supplemental Oxygen Room Air Room Air Room Air Room Air Room Air Room Air Room Air   Heart Rate 98 bpm 111 bpm 102 bpm 98 bpm 90 bpm 113 bpm 108 bpm   Blood Pressure 167/63 156/80 168/71 159/70 136/62 169/76 138/63   Ju Dyspnea Rating  somewhat heavy somewhat heavy moderate somewhat heavy somewhat heavy somewhat heavy somewhat heavy   Recovery Time (seconds) 157 seconds 121 seconds 123 seconds 100 seconds 131 seconds 160 seconds 39 seconds   Oxygen Saturation 100 % 100 % 100 % 100 % 100 % 97 % 97 %   Supplemental Oxygen  Room Air Room Air Room Air Room Air Room Air Room Air   Heart Rate 88 bpm 84 bpm 90 bpm 82 bpm 71 bpm 84 bpm 103 bpm       Imaging:  Results for orders placed during the hospital encounter of 12/22/20    CT Chest Without Contrast    Narrative  EXAMINATION:  CT CHEST WITHOUT CONTRAST    CLINICAL HISTORY:  pulmonary hypertension; Pulmonary hypertension, unspecified    TECHNIQUE:  Using low dose technique the chest was surveyed from above the pulmonary apices through the posterior costophrenic angles.  Data was reconstructed for multiplanar images in axial, sagittal and coronal planes and for maximal intensity projection images in the axial plane.    All CT scans at this facility use dose modulation, iterative reconstruction and/or weight based dosing when appropriate to reduce radiation dose to as low as reasonably achievable.    Xray dose: DLP = 152.03 mGy-cm.    COMPARISON:  CT chest abdomen without contrast 03/13/2019.  CT chest without contrast 09/18/2015.    FINDINGS:  Base of Neck: No significant abnormality.    Aorta: Left-sided aortic arch with 3 arterial branches. The aorta maintains normal caliber, contour and course. There is mild calcification of the thoracic  aorta or coronary arteries.    Heart/pericardium: Normal size.  No pericardial effusion or calcification.  Calcification of the aortic valve annulus.    Pulmonary vasculature: Pulmonary arteries distribute normally.  Pulmonary artery size is neither specific nor sensitive for normal or elevated pulmonary arterial pressures.    -There are four pulmonary veins.    Barbara/Mediastinum: No pathologic noelle enlargement.    Esophagus: The esophagus is mildly dilated with dependent fluid, similar to prior exam.    Upper Abdomen: No abnormality of the partially imaged upper abdomen.    Thoracic soft tissues: Normal. Both breasts are present.    Bones: No acute fracture. No suspicious lytic or sclerotic lesion.    Airways: Patent.    Lungs/pleura:    -Stable biapical scarring.    -Redemonstration of cystic changes and reticulation plus subsegmental ground-glass disease, most extensive in the lower lung zones.  Radiographic appearance is more typical for NSIP but could represent UIP in this patient with scleroderma.    -There are stable scattered foci of tree-in-bud nodularities, for example within the superior segment of the right lower lobe (axial series 4, image 211).  There is associated dilatation of multiple adjacent small airways.    -0.4 cm solid pulmonary nodule in the apical segment of the right upper lobe (axial series 4, image 83), stable dating back to 2015.    -No pleural fluid or thickening.No pleural calcification. No pneumothorax.    Impression  1.  Overall stable appearing chronic interstitial lung disease consistent with patient's history of scleroderma.  Radiographic appearance is more typical for NSIP and UIP, noting that UIP is a more common disease.    2.  Previously characterized tree-in-bud opacity along the superior aspect of the right oblique fissure appears stable and may reflect chronic inflammatory process.    3.  0.4 cm solid right upper lobe pulmonary nodule, stable dating back to 09/18/2015.   Such stability favors benign etiology.    4.  Persistent fluid-filled esophagus as noted on multiple priors placing the patient at increased risk for aspiration.  This may be related to the patient's history of scleroderma; clinical considerations will determine if further assessment of esophageal motility is warranted.    Electronically signed by resident: Leobardo Arellano  Date:    12/22/2020  Time:    11:43    Electronically signed by: Maxine Del Cid MD  Date:    12/22/2020  Time:    14:23      Cardiodiagnostics:  Results for orders placed during the hospital encounter of 12/29/21    Echo    Interpretation Summary  · The left ventricle is normal in size with normal systolic function.  · The estimated ejection fraction is 63%.  · Normal left ventricular diastolic function.  · Normal right ventricular size with normal right ventricular systolic function.  · Mild pulmonic regurgitation.  · Normal central venous pressure (3 mmHg).  · The estimated PA systolic pressure is 32 mmHg.  · Trivial posterior pericardial effusion.    Results for orders placed during the hospital encounter of 08/17/23    Echo    Interpretation Summary    Left Ventricle: The left ventricle is normal in size. Normal wall thickness. Normal wall motion. There is normal systolic function with a visually estimated ejection fraction of 60 - 65%. There is normal diastolic function.    Right Ventricle: Normal right ventricular cavity size. Wall thickness is normal. Right ventricle wall motion  is normal. Systolic function is normal.    Pulmonary Artery: The estimated pulmonary artery systolic pressure is 40 mmHg.      Assessment:  1. Scleroderma with pulmonary involvement    2. ILD (interstitial lung disease)    3. Pulmonary hypertension    4. Gastroesophageal reflux disease, unspecified whether esophagitis present        Plan:     Followed for SSc-ILD with PAH. On MMF and OFEV.  No PFTs or 6MWT today. Has known PH which is stable on Adcirca.      Continue to follow with GI for GERD/scleroderma esophagus. Discussed elevation of the head of her bed to help with nocturnal symptoms. Continue with PPI. Her uncontrolled reflux is likely contributing to her worsening symptoms.     Continue Adcirca and PH follow-up.     4. RTC in 3 months with PFTs and 6MWT       Roberta Leigh D.O.  Pulmonary/Critical Care and Lung Transplantation  [facility] [place]

## 2024-05-07 ENCOUNTER — INFUSION (OUTPATIENT)
Dept: CARDIOLOGY | Facility: HOSPITAL | Age: 74
End: 2024-05-07
Payer: MEDICARE

## 2024-05-07 VITALS
HEIGHT: 65 IN | OXYGEN SATURATION: 96 % | BODY MASS INDEX: 24.77 KG/M2 | SYSTOLIC BLOOD PRESSURE: 158 MMHG | WEIGHT: 148.69 LBS | DIASTOLIC BLOOD PRESSURE: 70 MMHG | HEART RATE: 78 BPM

## 2024-05-07 DIAGNOSIS — D50.9 IRON DEFICIENCY ANEMIA, UNSPECIFIED IRON DEFICIENCY ANEMIA TYPE: ICD-10-CM

## 2024-05-07 DIAGNOSIS — R79.9 ABNORMAL FINDING OF BLOOD CHEMISTRY, UNSPECIFIED: Primary | ICD-10-CM

## 2024-05-07 PROCEDURE — 63600175 PHARM REV CODE 636 W HCPCS: Mod: JZ,JG,TXP

## 2024-05-07 PROCEDURE — 25000003 PHARM REV CODE 250: Mod: TXP

## 2024-05-07 PROCEDURE — 96365 THER/PROPH/DIAG IV INF INIT: CPT | Mod: NTX

## 2024-05-07 PROCEDURE — 99211 OFF/OP EST MAY X REQ PHY/QHP: CPT | Mod: NTX,,, | Performed by: INTERNAL MEDICINE

## 2024-05-07 RX ORDER — HEPARIN 100 UNIT/ML
500 SYRINGE INTRAVENOUS
OUTPATIENT
Start: 2024-05-07

## 2024-05-07 RX ORDER — SODIUM CHLORIDE 0.9 % (FLUSH) 0.9 %
10 SYRINGE (ML) INJECTION
Status: CANCELLED | OUTPATIENT
Start: 2024-05-07

## 2024-05-07 RX ORDER — DIPHENHYDRAMINE HYDROCHLORIDE 50 MG/ML
50 INJECTION INTRAMUSCULAR; INTRAVENOUS ONCE AS NEEDED
OUTPATIENT
Start: 2024-05-07

## 2024-05-07 RX ORDER — EPINEPHRINE 0.3 MG/.3ML
0.3 INJECTION SUBCUTANEOUS ONCE AS NEEDED
OUTPATIENT
Start: 2024-05-07

## 2024-05-07 RX ORDER — SODIUM CHLORIDE 0.9 % (FLUSH) 0.9 %
10 SYRINGE (ML) INJECTION
Status: DISCONTINUED | OUTPATIENT
Start: 2024-05-07 | End: 2024-05-07 | Stop reason: HOSPADM

## 2024-05-07 RX ADMIN — FERUMOXYTOL 510 MG: 510 INJECTION INTRAVENOUS at 11:05

## 2024-05-07 RX ADMIN — SODIUM CHLORIDE: 9 INJECTION, SOLUTION INTRAVENOUS at 10:05

## 2024-05-07 NOTE — PROGRESS NOTES
Arrived here for 1st Iron infusion. AAox3,respiration unlabored,vss. Explained procedure and protocol. Time given for questions and answers given. Notified Midlevel S Belle NP as ordered. Iv started and tolerated.  Will continue to monitor. Iron infusing over 30 minutes per MD orders.  Observed pt for 45 mins.  No adverse reaction noted/reported. Handout given in hand and discussed.  Vss,reps unlabored throughout infusion.     RTC 4/20/24 for 2nd  Iron Infusion

## 2024-05-07 NOTE — PLAN OF CARE
Problem: Adult Inpatient Plan of Care  Goal: Plan of Care Review  Outcome: Progressing  Goal: Patient-Specific Goal (Individualized)  Outcome: Progressing  Goal: Absence of Hospital-Acquired Illness or Injury  Outcome: Progressing  Goal: Optimal Comfort and Wellbeing  Outcome: Progressing  Goal: Readiness for Transition of Care  Outcome: Progressing     Problem: Anemia  Goal: Anemia Symptom Improvement  Outcome: Progressing

## 2024-05-10 DIAGNOSIS — Z79.899 HIGH RISK MEDICATION USE: ICD-10-CM

## 2024-05-10 DIAGNOSIS — M34.9 SCLERODERMA WITH PULMONARY INVOLVEMENT: Primary | ICD-10-CM

## 2024-05-10 DIAGNOSIS — J84.9 ILD (INTERSTITIAL LUNG DISEASE): ICD-10-CM

## 2024-05-10 NOTE — PROGRESS NOTES
Per provider note Dr. Leigh, patient RTC August 2024 with PFTs, CMP, CBC, 6MWT. Start on room air, modified. Request to .

## 2024-05-16 NOTE — PROGRESS NOTES
73 F  diagnosed with scleroderma in 1983, followed by Dr. Lim (and previously Dr. Boudreaux). She was on revatio in 2013, then switched to adcirca 40mg po daily, which she has remained on every since.She is followed closely by Rheum and ALD/lung transplant. Here for first iron infusion.     Plan for IV iron/discharge per protocol

## 2024-05-20 ENCOUNTER — INFUSION (OUTPATIENT)
Dept: CARDIOLOGY | Facility: HOSPITAL | Age: 74
End: 2024-05-20
Payer: MEDICARE

## 2024-05-20 VITALS
HEART RATE: 78 BPM | SYSTOLIC BLOOD PRESSURE: 149 MMHG | OXYGEN SATURATION: 100 % | TEMPERATURE: 98 F | DIASTOLIC BLOOD PRESSURE: 65 MMHG | RESPIRATION RATE: 22 BRPM

## 2024-05-20 DIAGNOSIS — D50.9 IRON DEFICIENCY ANEMIA, UNSPECIFIED IRON DEFICIENCY ANEMIA TYPE: ICD-10-CM

## 2024-05-20 DIAGNOSIS — R79.9 ABNORMAL FINDING OF BLOOD CHEMISTRY, UNSPECIFIED: Primary | ICD-10-CM

## 2024-05-20 PROCEDURE — 96367 TX/PROPH/DG ADDL SEQ IV INF: CPT | Mod: NTX

## 2024-05-20 PROCEDURE — 63600175 PHARM REV CODE 636 W HCPCS: Mod: JZ,JG,NTX

## 2024-05-20 PROCEDURE — 25000003 PHARM REV CODE 250: Mod: TXP

## 2024-05-20 RX ORDER — EPINEPHRINE 0.3 MG/.3ML
0.3 INJECTION SUBCUTANEOUS ONCE AS NEEDED
OUTPATIENT
Start: 2024-05-20

## 2024-05-20 RX ORDER — SODIUM CHLORIDE 0.9 % (FLUSH) 0.9 %
10 SYRINGE (ML) INJECTION
Status: CANCELLED | OUTPATIENT
Start: 2024-05-20

## 2024-05-20 RX ORDER — SODIUM CHLORIDE 0.9 % (FLUSH) 0.9 %
10 SYRINGE (ML) INJECTION
Status: DISCONTINUED | OUTPATIENT
Start: 2024-05-20 | End: 2024-05-20 | Stop reason: HOSPADM

## 2024-05-20 RX ORDER — HEPARIN 100 UNIT/ML
500 SYRINGE INTRAVENOUS
OUTPATIENT
Start: 2024-05-20

## 2024-05-20 RX ORDER — DIPHENHYDRAMINE HYDROCHLORIDE 50 MG/ML
50 INJECTION INTRAMUSCULAR; INTRAVENOUS ONCE AS NEEDED
OUTPATIENT
Start: 2024-05-20

## 2024-05-20 RX ADMIN — SODIUM CHLORIDE: 9 INJECTION, SOLUTION INTRAVENOUS at 11:05

## 2024-05-20 RX ADMIN — FERUMOXYTOL 510 MG: 510 INJECTION INTRAVENOUS at 11:05

## 2024-05-20 NOTE — PROGRESS NOTES
Arrived here for 2nd Iron infusion. AAox3,respiration unlabored,vss. Explained procedure and protocol. Time given for questions and answers given. Notified Midlevel S Belle NP as ordered. Iv started and tolerated.  Will continue to monitor.  Iron infusing over 30 minutes per MD orders. No adverse reaction noted/reported.Handout given in hand and discussed. Vss,reps unlabored throughout infusion.

## 2024-05-20 NOTE — PLAN OF CARE
Problem: Adult Inpatient Plan of Care  Goal: Plan of Care Review  Outcome: Progressing  Goal: Patient-Specific Goal (Individualized)  Outcome: Progressing  Goal: Absence of Hospital-Acquired Illness or Injury  Outcome: Progressing  Goal: Optimal Comfort and Wellbeing  Outcome: Progressing  Goal: Readiness for Transition of Care  Outcome: Progressing     Problem: Fatigue  Goal: Improved Activity Tolerance  Outcome: Progressing     Problem: Anemia  Goal: Anemia Symptom Improvement  Outcome: Progressing

## 2024-05-23 ENCOUNTER — OFFICE VISIT (OUTPATIENT)
Dept: GASTROENTEROLOGY | Facility: CLINIC | Age: 74
End: 2024-05-23
Payer: MEDICARE

## 2024-05-23 ENCOUNTER — PATIENT OUTREACH (OUTPATIENT)
Dept: ADMINISTRATIVE | Facility: HOSPITAL | Age: 74
End: 2024-05-23
Payer: MEDICARE

## 2024-05-23 ENCOUNTER — DOCUMENTATION ONLY (OUTPATIENT)
Dept: PHARMACY | Facility: CLINIC | Age: 74
End: 2024-05-23
Payer: MEDICARE

## 2024-05-23 VITALS — SYSTOLIC BLOOD PRESSURE: 122 MMHG | DIASTOLIC BLOOD PRESSURE: 64 MMHG

## 2024-05-23 VITALS
SYSTOLIC BLOOD PRESSURE: 122 MMHG | HEIGHT: 65 IN | WEIGHT: 143.94 LBS | HEART RATE: 67 BPM | DIASTOLIC BLOOD PRESSURE: 64 MMHG | BODY MASS INDEX: 23.98 KG/M2

## 2024-05-23 DIAGNOSIS — M34.9 SCLERODERMA: ICD-10-CM

## 2024-05-23 DIAGNOSIS — M34.9 SCLERODERMA WITH PULMONARY INVOLVEMENT: ICD-10-CM

## 2024-05-23 DIAGNOSIS — R13.10 DYSPHAGIA, UNSPECIFIED TYPE: ICD-10-CM

## 2024-05-23 DIAGNOSIS — K21.9 GASTROESOPHAGEAL REFLUX DISEASE, UNSPECIFIED WHETHER ESOPHAGITIS PRESENT: ICD-10-CM

## 2024-05-23 PROCEDURE — 99999 PR PBB SHADOW E&M-EST. PATIENT-LVL V: CPT | Mod: PBBFAC,TXP,, | Performed by: INTERNAL MEDICINE

## 2024-05-23 PROCEDURE — 99215 OFFICE O/P EST HI 40 MIN: CPT | Mod: PBBFAC,TXP | Performed by: INTERNAL MEDICINE

## 2024-05-23 PROCEDURE — 99213 OFFICE O/P EST LOW 20 MIN: CPT | Mod: S$PBB,NTX,, | Performed by: INTERNAL MEDICINE

## 2024-05-23 RX ORDER — RABEPRAZOLE SODIUM 20 MG/1
20 TABLET, DELAYED RELEASE ORAL 2 TIMES DAILY
Qty: 60 TABLET | Refills: 11 | Status: SHIPPED | OUTPATIENT
Start: 2024-05-23

## 2024-05-23 NOTE — PROGRESS NOTES
"[facility]    Referral from Dr. Leigh    CC: Acid reflux    HPI 73 y.o. female with past medical history of GERD, Pagan's esophagus, gastroparesis, GIST, Scleroderma esophagus who is s/p Robotic hiatal hernia repair, pyloromyotomy, umbilical hernia repair, gastropexy, excision of lesion in the stomach (GIST) who presents with chronic, intermittent, breakthrough acid reflux that occurs 2 times per week with associated fishy taste in her mouth.  Currently she takes Rabeprazole 20mg daily for GERD.    She denies nausea and vomiting.    She has 3 BM's per day.    Sometimes her stomach hurts if she eats a large heavy meal but this improves with having a BM.    She has dysphagia at times, usually when she eats out.  Sometimes she has to bring the food up.    Past Medical History  Past Medical History:   Diagnosis Date    Abnormal Pap smear     Acid reflux     Allergy     Arthritis     Encounter for blood transfusion     GERD (gastroesophageal reflux disease)     Hiatal hernia 03/24/2023    History of migraine headaches     Hx of colonic polyp     Hypertension     Idiopathic neuropathy 07/20/2012    ILD (interstitial lung disease) 11/06/2013    Iron deficiency anemia 03/18/2014    MRSA carrier     Osteopenia     Pneumonia     Pulmonary fibrosis     Pulmonary hypertension     Raynaud's disease     Scleroderma, diffuse     Sjogren's syndrome     Vitamin D deficiency 11/14/2013           Physical Examination  /64   Pulse 67   Ht 5' 5" (1.651 m)   Wt 65.3 kg (143 lb 15.4 oz)   BMI 23.96 kg/m²   General appearance: alert, cooperative, no distress  HENT: Normocephalic, atraumatic, neck symmetrical, no nasal discharge   Abdomen: soft, non-tender; non distended, no rebound or guarding  Neurologic: Alert and oriented X 3, moving all four extremities, intact sensation to light touch    Labs:  Lab Results   Component Value Date    WBC 6.44 05/02/2024    HGB 11.2 (L) 05/02/2024    HCT 32.9 (L) " 05/02/2024    MCV 84 05/02/2024     05/02/2024         CMP  Sodium   Date Value Ref Range Status   05/02/2024 138 136 - 145 mmol/L Final     Potassium   Date Value Ref Range Status   05/02/2024 4.0 3.5 - 5.1 mmol/L Final     Chloride   Date Value Ref Range Status   05/02/2024 107 95 - 110 mmol/L Final     CO2   Date Value Ref Range Status   05/02/2024 24 23 - 29 mmol/L Final     Glucose   Date Value Ref Range Status   05/02/2024 81 70 - 110 mg/dL Final     BUN   Date Value Ref Range Status   05/02/2024 15 8 - 23 mg/dL Final     Creatinine   Date Value Ref Range Status   05/02/2024 0.8 0.5 - 1.4 mg/dL Final     Calcium   Date Value Ref Range Status   05/02/2024 8.9 8.7 - 10.5 mg/dL Final     Total Protein   Date Value Ref Range Status   05/02/2024 8.4 6.0 - 8.4 g/dL Final     Albumin   Date Value Ref Range Status   05/02/2024 3.2 (L) 3.5 - 5.2 g/dL Final     Total Bilirubin   Date Value Ref Range Status   05/02/2024 0.3 0.1 - 1.0 mg/dL Final     Comment:     For infants and newborns, interpretation of results should be based  on gestational age, weight and in agreement with clinical  observations.    Premature Infant recommended reference ranges:  Up to 24 hours.............<8.0 mg/dL  Up to 48 hours............<12.0 mg/dL  3-5 days..................<15.0 mg/dL  6-29 days.................<15.0 mg/dL       Alkaline Phosphatase   Date Value Ref Range Status   05/02/2024 109 55 - 135 U/L Final     AST   Date Value Ref Range Status   05/02/2024 16 10 - 40 U/L Final     ALT   Date Value Ref Range Status   05/02/2024 8 (L) 10 - 44 U/L Final     Anion Gap   Date Value Ref Range Status   05/02/2024 7 (L) 8 - 16 mmol/L Final     eGFR   Date Value Ref Range Status   05/02/2024 >60.0 >60 mL/min/1.73 m^2 Final           Assessment:   1. Scleroderma with pulmonary involvement    2. Dysphagia, unspecified type    3. Scleroderma    4. Gastroesophageal reflux disease, unspecified whether esophagitis present         Plan:  -Increase Rabeprazole to 20mg twice per day for uncontrolled GERD.  -Plan for EGD and colonoscopy in late 2025/early 2026 for Pagan's esophagus and colon polyp surveillance.    Marlene Benites MD

## 2024-05-28 ENCOUNTER — PATIENT MESSAGE (OUTPATIENT)
Dept: TRANSPLANT | Facility: CLINIC | Age: 74
End: 2024-05-28
Payer: MEDICARE

## 2024-05-28 DIAGNOSIS — J84.9 ILD (INTERSTITIAL LUNG DISEASE): Primary | ICD-10-CM

## 2024-05-28 DIAGNOSIS — I27.9 CHRONIC PULMONARY HEART DISEASE: ICD-10-CM

## 2024-05-28 DIAGNOSIS — I27.20 PULMONARY HYPERTENSION: ICD-10-CM

## 2024-05-28 DIAGNOSIS — M34.9 SCLERODERMA: ICD-10-CM

## 2024-05-28 RX ORDER — TADALAFIL 20 MG/1
40 TABLET ORAL DAILY
Qty: 60 TABLET | Refills: 11 | Status: SHIPPED | OUTPATIENT
Start: 2024-05-28

## 2024-05-28 NOTE — TELEPHONE ENCOUNTER
----- Message from Venus Hightower sent at 5/28/2024  9:08 AM CDT -----  Needs advice from nurse:      Who [facility]  Regarding:needs refill on tadalafil (ADCIRCA) 20 mg Tab 60 tablet 11 5/10/2023 -   Sig - Route: Take 2 tablets (40 mg total) by mouth once daily. - Oral       Would the patient rather a call back or VIA [facility]?  Best Call Back number:[phone] fax   Additional Info:

## 2024-05-29 NOTE — PROGRESS NOTES
Subjective:       Patient ID: Yamel Shah is a 73 y.o. female.    Chief Complaint: Hypertension (4-5 mos fol up.  Under treatment for ulcers on feet , feet pain at 5)    HPI  The patient presents for follow-up of medical conditions which include hypertension, peripheral neuropathy, scleroderma, chronic foot ulcers, interstitial lung disease.  The patient was recently diagnosed to have a lower respiratory tract infection while visiting [facility].  She had presented with chills cough sore throat and chest congestion.  Zithromax and prednisone were prescribed.  The patient reports symptoms have significantly improved with resolution of chills and fever.  She still notes a nonproductive cough.    The patient has hypertension.  She does not monitor blood pressures routinely.  She is tolerating her blood pressure medication well without side effects.    She has scleroderma.  She is followed by her rheumatologist.  She has interstitial lung disease also complicating her condition.  She has peripheral neuropathy.  She has had chronic foot ulcers.  She performs self-care for these ulcers declining any ongoing care with [facility].    Review of Systems   Constitutional:  Negative for activity change, appetite change, chills, fatigue, fever and unexpected weight change.   Eyes:  Negative for visual disturbance.   Respiratory:  Positive for cough. Negative for shortness of breath and wheezing.    Cardiovascular:  Negative for chest pain, palpitations and leg swelling.   Gastrointestinal:  Negative for abdominal pain, blood in stool, constipation and diarrhea.   Genitourinary:  Negative for dysuria and hematuria.   Musculoskeletal:  Negative for arthralgias, neck pain and neck stiffness.   Integumentary:  Positive for wound. Negative for rash.   Neurological:  Negative for dizziness, syncope and headaches.   Psychiatric/Behavioral:  Negative for sleep disturbance.             Physical Exam  Vitals and  nursing note reviewed.   Constitutional:       General: She is not in acute distress.     Appearance: Normal appearance. She is well-developed.      Comments: The patient has lost 5 lb since 10/26/23.   HENT:      Head: Normocephalic and atraumatic.   Eyes:      General: No scleral icterus.     Extraocular Movements: Extraocular movements intact.      Conjunctiva/sclera: Conjunctivae normal.   Neck:      Thyroid: No thyromegaly.      Vascular: No JVD.   Cardiovascular:      Rate and Rhythm: Normal rate and regular rhythm.      Heart sounds: Normal heart sounds. No murmur heard.     No friction rub. No gallop.   Pulmonary:      Effort: Pulmonary effort is normal. No respiratory distress.      Breath sounds: Normal breath sounds. No wheezing or rales.   Abdominal:      General: Bowel sounds are normal.      Palpations: Abdomen is soft. There is no mass.      Tenderness: There is no abdominal tenderness.   Musculoskeletal:         General: No tenderness. Normal range of motion.      Cervical back: Normal range of motion and neck supple.      Right lower leg: No edema.      Left lower leg: No edema.   Lymphadenopathy:      Cervical: No cervical adenopathy.   Skin:     General: Skin is warm and dry.      Findings: No rash.   Neurological:      Mental Status: She is alert and oriented to person, place, and time.   Psychiatric:         Mood and Affect: Mood normal.         Behavior: Behavior normal.               Assessment & Plan:      Yamel was seen today for hypertension.  The patient may continue over-the-counter Robitussin or Mucinex as needed.  The patient will follow-up with rheumatology regarding scleroderma.  Lab studies will be obtained in 10/24 for routine annual checkup.  The patient will [facility] with [facility].    Patient will be due for colonoscopy in 2026.    Diagnoses and all orders for this visit:    Essential hypertension  -     Comprehensive Metabolic Panel; Future  -     Lipid Panel;  Future  -     CBC Auto Differential; Future  -     TSH; Future  -     Hemoglobin A1C; Future    Scleroderma with pulmonary involvement  -     Comprehensive Metabolic Panel; Future  -     Lipid Panel; Future  -     CBC Auto Differential; Future  -     TSH; Future  -     Hemoglobin A1C; Future    Skin ulcers of both feet  -     Comprehensive Metabolic Panel; Future  -     Lipid Panel; Future  -     CBC Auto Differential; Future  -     TSH; Future  -     Hemoglobin A1C; Future    ILD (interstitial lung disease)  -     Comprehensive Metabolic Panel; Future  -     Lipid Panel; Future  -     CBC Auto Differential; Future  -     TSH; Future  -     Hemoglobin A1C; Future    Immunosuppression due to drug therapy  -     Comprehensive Metabolic Panel; Future  -     Lipid Panel; Future  -     CBC Auto Differential; Future  -     TSH; Future  -     Hemoglobin A1C; Future         Follow up in about 6 months (around 9/26/2024).     Aly Rand MD

## 2024-06-03 ENCOUNTER — PATIENT MESSAGE (OUTPATIENT)
Dept: ADMINISTRATIVE | Facility: OTHER | Age: 74
End: 2024-06-03
Payer: MEDICARE

## 2024-06-03 ENCOUNTER — PATIENT MESSAGE (OUTPATIENT)
Dept: ADMINISTRATIVE | Facility: HOSPITAL | Age: 74
End: 2024-06-03
Payer: MEDICARE

## 2024-06-03 DIAGNOSIS — M34.9 SCLERODERMA: ICD-10-CM

## 2024-06-03 DIAGNOSIS — E78.5 HYPERLIPIDEMIA: ICD-10-CM

## 2024-06-03 RX ORDER — PRAVASTATIN SODIUM 20 MG/1
20 TABLET ORAL NIGHTLY
Qty: 90 TABLET | Refills: 1 | Status: SHIPPED | OUTPATIENT
Start: 2024-06-03 | End: 2024-06-19

## 2024-06-03 NOTE — TELEPHONE ENCOUNTER
Refill Authorization Note     Refill Decision Note   Yamel Pablo  is requesting a refill authorization.  Brief Assessment and Rationale for Refill:  Approve     Medication Therapy Plan:       Medication Reconciliation Completed: No   Comments:     No Care Gaps recommended.     Note composed:6:59 PM 06/03/2024

## 2024-06-03 NOTE — TELEPHONE ENCOUNTER
No care due was identified.  [facility] Embedded Care Due Messages. Reference number: [id number].   6/03/2024 12:15:38 PM CDT

## 2024-06-04 ENCOUNTER — LAB VISIT (OUTPATIENT)
Dept: LAB | Facility: HOSPITAL | Age: 74
End: 2024-06-04
Payer: MEDICARE

## 2024-06-04 ENCOUNTER — TELEPHONE (OUTPATIENT)
Dept: TRANSPLANT | Facility: CLINIC | Age: 74
End: 2024-06-04
Payer: MEDICARE

## 2024-06-04 DIAGNOSIS — Z79.899 HIGH RISK MEDICATION USE: ICD-10-CM

## 2024-06-04 LAB
ALBUMIN SERPL BCP-MCNC: 3.3 G/DL (ref 3.5–5.2)
ALP SERPL-CCNC: 92 U/L (ref 55–135)
ALT SERPL W/O P-5'-P-CCNC: 10 U/L (ref 10–44)
ANION GAP SERPL CALC-SCNC: 7 MMOL/L (ref 8–16)
AST SERPL-CCNC: 15 U/L (ref 10–40)
BILIRUB SERPL-MCNC: 0.3 MG/DL (ref 0.1–1)
BUN SERPL-MCNC: 25 MG/DL (ref 8–23)
CALCIUM SERPL-MCNC: 9.3 MG/DL (ref 8.7–10.5)
CHLORIDE SERPL-SCNC: 111 MMOL/L (ref 95–110)
CO2 SERPL-SCNC: 22 MMOL/L (ref 23–29)
CREAT SERPL-MCNC: 0.9 MG/DL (ref 0.5–1.4)
EST. GFR  (NO RACE VARIABLE): >60 ML/MIN/1.73 M^2
GLUCOSE SERPL-MCNC: 91 MG/DL (ref 70–110)
POTASSIUM SERPL-SCNC: 4 MMOL/L (ref 3.5–5.1)
PROT SERPL-MCNC: 8 G/DL (ref 6–8.4)
SODIUM SERPL-SCNC: 140 MMOL/L (ref 136–145)

## 2024-06-04 PROCEDURE — 36415 COLL VENOUS BLD VENIPUNCTURE: CPT | Mod: TXP | Performed by: INTERNAL MEDICINE

## 2024-06-04 PROCEDURE — 80053 COMPREHEN METABOLIC PANEL: CPT | Mod: NTX | Performed by: INTERNAL MEDICINE

## 2024-06-04 NOTE — TELEPHONE ENCOUNTER
----- Message from Roberta Leigh DO sent at 6/4/2024 11:39 AM CDT -----  LFTs look good.  No changes  ----- Message -----  From: Johanna Fernandez  Sent: 6/4/2024  11:27 AM CDT  To: Roberta Leigh DO    Routine OFEV monitoring.

## 2024-06-11 ENCOUNTER — PATIENT OUTREACH (OUTPATIENT)
Dept: ADMINISTRATIVE | Facility: HOSPITAL | Age: 74
End: 2024-06-11
Payer: MEDICARE

## 2024-06-11 DIAGNOSIS — M34.9 SCLERODERMA: ICD-10-CM

## 2024-06-11 DIAGNOSIS — Z12.31 SCREENING MAMMOGRAM FOR BREAST CANCER: Primary | ICD-10-CM

## 2024-06-11 RX ORDER — MYCOPHENOLATE MOFETIL 200 MG/ML
1000 POWDER, FOR SUSPENSION ORAL 2 TIMES DAILY
Qty: 480 ML | Refills: 1 | Status: ACTIVE | OUTPATIENT
Start: 2024-06-11 | End: 2024-09-15

## 2024-06-19 DIAGNOSIS — E78.5 HYPERLIPIDEMIA: ICD-10-CM

## 2024-06-19 DIAGNOSIS — M34.9 SCLERODERMA: ICD-10-CM

## 2024-06-19 RX ORDER — PRAVASTATIN SODIUM 20 MG/1
20 TABLET ORAL NIGHTLY
Qty: 90 TABLET | Refills: 1 | Status: SHIPPED | OUTPATIENT
Start: 2024-06-19

## 2024-06-19 NOTE — TELEPHONE ENCOUNTER
Refill Decision Note   Yamel Shah  is requesting a refill authorization.  Brief Assessment and Rationale for Refill:  Approve     Medication Therapy Plan:        Comments:     Note composed:10:28 AM 06/19/2024

## 2024-06-19 NOTE — TELEPHONE ENCOUNTER
No care due was identified.  [facility] Embedded Care Due Messages. Reference number: [id number].   6/19/2024 10:04:14 AM CDT

## 2024-06-28 ENCOUNTER — LAB VISIT (OUTPATIENT)
Dept: LAB | Facility: HOSPITAL | Age: 74
End: 2024-06-28
Payer: MEDICARE

## 2024-06-28 DIAGNOSIS — D84.821 IMMUNOSUPPRESSION DUE TO DRUG THERAPY: ICD-10-CM

## 2024-06-28 DIAGNOSIS — M34.9 SCLERODERMA WITH PULMONARY INVOLVEMENT: ICD-10-CM

## 2024-06-28 DIAGNOSIS — Z79.899 IMMUNOSUPPRESSION DUE TO DRUG THERAPY: ICD-10-CM

## 2024-07-01 ENCOUNTER — OFFICE VISIT (OUTPATIENT)
Dept: RHEUMATOLOGY | Facility: CLINIC | Age: 74
End: 2024-07-01
Payer: MEDICARE

## 2024-07-01 VITALS
SYSTOLIC BLOOD PRESSURE: 133 MMHG | WEIGHT: 143.31 LBS | DIASTOLIC BLOOD PRESSURE: 64 MMHG | BODY MASS INDEX: 23.85 KG/M2 | HEART RATE: 69 BPM

## 2024-07-01 DIAGNOSIS — I27.29 PULMONARY HYPERTENSION ASSOCIATED WITH SYSTEMIC DISORDER: Chronic | ICD-10-CM

## 2024-07-01 DIAGNOSIS — I73.00 RAYNAUD'S DISEASE WITHOUT GANGRENE: ICD-10-CM

## 2024-07-01 DIAGNOSIS — I87.2 VENOUS INSUFFICIENCY OF BOTH LOWER EXTREMITIES: ICD-10-CM

## 2024-07-01 DIAGNOSIS — M79.641 BILATERAL HAND PAIN: ICD-10-CM

## 2024-07-01 DIAGNOSIS — J84.9 ILD (INTERSTITIAL LUNG DISEASE): ICD-10-CM

## 2024-07-01 DIAGNOSIS — M79.642 BILATERAL HAND PAIN: ICD-10-CM

## 2024-07-01 DIAGNOSIS — I73.9 PVD (PERIPHERAL VASCULAR DISEASE): ICD-10-CM

## 2024-07-01 DIAGNOSIS — G89.4 CHRONIC PAIN DISORDER: ICD-10-CM

## 2024-07-01 DIAGNOSIS — D84.821 IMMUNOSUPPRESSION DUE TO DRUG THERAPY: ICD-10-CM

## 2024-07-01 DIAGNOSIS — Z79.899 IMMUNOSUPPRESSION DUE TO DRUG THERAPY: ICD-10-CM

## 2024-07-01 DIAGNOSIS — M34.9 SCLERODERMA: Primary | ICD-10-CM

## 2024-07-01 PROCEDURE — 99999 PR PBB SHADOW E&M-EST. PATIENT-LVL V: CPT | Mod: PBBFAC,GC,TXP, | Performed by: STUDENT IN AN ORGANIZED HEALTH CARE EDUCATION/TRAINING PROGRAM

## 2024-07-01 PROCEDURE — 99215 OFFICE O/P EST HI 40 MIN: CPT | Mod: PBBFAC,TXP | Performed by: STUDENT IN AN ORGANIZED HEALTH CARE EDUCATION/TRAINING PROGRAM

## 2024-07-01 PROCEDURE — 99215 OFFICE O/P EST HI 40 MIN: CPT | Mod: S$PBB,GC,NTX, | Performed by: STUDENT IN AN ORGANIZED HEALTH CARE EDUCATION/TRAINING PROGRAM

## 2024-07-01 NOTE — PROGRESS NOTES
"I have personally reviewed the history, confirmed exam findings, and discussed assessment and plan with fellow.        PFTs       FVC     SVC      RV     DLco    TLC     3/4/24     75.1                  47.2    40.4      63.3  9/18/23   66.1                  44.2    42.1      52.8  5/22/23   71.1                   68.5   52.3      66.8  10/7/22   67.6                    63.1  68.9      62.7  1/3/22     67.5   5/25/21   67.8                    59.3   64.1      62.4  1/10/20    74                              72  3/12/19    60         64         61      82  3/6/18      64         70         64      112  4/8/16      70         70         89       84  9/4/15      66         62         72       71  1/19/15    66       Narrative & Impression  EXAMINATION:  XR CHEST AP PORTABLE     CLINICAL HISTORY:  Provided history is "CHF;  ".     TECHNIQUE:  One view of the chest.     COMPARISON:  09/23/2023.     FINDINGS:  Cardiomediastinal silhouette is prominent and similar to prior study, potentially exaggerated by portable technique.  Atherosclerotic calcifications overlie the aortic arch.  There are coarse interstitial lung markings with chronic appearing interstitial changes throughout both lungs, better evaluated on prior chest CT dated 09/08/2023.  Suspect new/worse patchy perihilar and lower lung zone ground-glass and airspace opacities, possibly exaggerated by soft tissue attenuation of the x-ray beam, low lung volumes, and portable technique.  Upper lungs are relatively stable with chronic interstitial changes but no focal consolidation.  No large pleural effusion.  No distinct pneumothorax.     Impression:     Findings suggestive of chronic interstitial lung disease with possible new ground-glass or airspace opacities in the perihilar and lower lung zones.  Suggest correlation for superimposed pulmonary edema or infectious/inflammatory process.  Follow-up PA and lateral views may provide improved sensitivity if there is " persistent clinical concern.        Electronically signed by: Kolton Agudelo MD  Date:                                            12/31/2023  Time:                                           07:55                       EXAMINATION:  CT CHEST WITHOUT CONTRAST     CLINICAL HISTORY:  Lung nodule, 6-8mm;3 month follow up solitary pulmonary nodule; Abnormal findings on diagnostic imaging of other specified body structures     TECHNIQUE:  Low dose axial images, sagittal and coronal reformations were obtained from the thoracic inlet to the lung bases. Contrast was not administered.     All CT scans at this facility use dose modulation, iterative reconstruction and/or weight based dosing when appropriate to reduce rad iation dose to as low as reasonably achievable.     COMPARISON:  12/22/2020, 06/02/2023     FINDINGS:  No intravenous contrast was administered for this examination.  Therefore, it may have diminished sensitivity for detection of certain abnormalities.     Thyroid gland: Within normal limits.     Thoracic soft tissues: Unremarkable.     Mediastinum: No pathologically enlarged lymph nodes.     Cardiovascular: Normal heart size.No pericardial effusion.     Trachea: Within normal limits.     Lungs/pleura/airways:     Redemonstration of basilar and subpleural predominant reticulation and ground-glass with architectural distortion, volume loss, and traction bronchiectasis/bronchiolectasis.  Worsening of patchy areas of ground-glass bilaterally.  There is a straight edge sign present with sharply marginated basilar pseudo honeycombing likely representing clustered dilated bronchiectasis and cystic bronchiectasis.  Anterior upper lobe sign also noted.  These findings are grossly stable compared to CT 12/22/2020.     0.6 cm solid pulmonary nodule in the left upper lobe (4-193), 0.4 cm solid pulmonary nodule left upper lobe (4-212), right upper lobe 0.4 cm solid pulmonary nodule (4-89).  These nodules are unchanged  from CT 06/02/2023, but new from CT 12/22/2020..     Esophagus: Esophagus is dilated and fluid-filled to the level of the aortic arch compatible with achalasia in the setting of scleroderma..     Upper abdomen:     Partially imaged.  No significant abnormalities.     Bones: Unremarkable for stated age.     Impression:     Redemonstration of pulmonary fibrosis with mild increased bilateral patchy areas of patchy ground-glass.  Findings favored represent as NSIP patient with history of scleroderma.     Solid pulmonary nodules measuring up to 0.6 cm as detailed above which are stable from CT 06/02/2023, but new from 12/22/2020.  This can be reassessed in the next follow-up     Stable patulous esophagus.     Electronically signed by resident: Mikala Kruse  Date:                                            09/08/2023  Time:                                           15:29     Electronically signed by: Bg Granado  Date:                                            09/08/2023  Time:                                           17:14     TTE 3/15/24:             Left Ventricle: The left ventricle is normal in size. Normal wall thickness. Normal wall motion. There is normal systolic function with a visually estimated ejection fraction of 55 - 60%. There is normal diastolic function.    Right Ventricle: Normal right ventricular cavity size. Wall thickness is normal. Right ventricle wall motion  is normal. Systolic function is normal.    The left atrium is moderately dilated.    Tricuspid Valve: There is mild regurgitation.    Pulmonary Artery: The estimated pulmonary artery systolic pressure is 43 mmHg.    IVC/SVC: Intermediate venous pressure at 8 mmHg.     RHC 3/16/23:     The estimated blood loss was none.    The filling pressures on the right and left were normal.    RA 5  PA 35/10 (18)  PCWP 10    CO 6.9 l/min  CI 3.9 l/min/m2.        Saw Claire Pierre in [facility] 4/2/24  1. ILD (interstitial lung disease)    2.  Scleroderma with pulmonary involvement    3. WHO group 1 pulmonary arterial hypertension    4. LRTI (lower respiratory tract infection)       Plan:      Followed for SSc-ILD with PAH. Currently holding her MMF due to acute illness. No desaturations on her previous 6MWT and distance unchanged. Has known PH which is stable on Adcirca. Concerns for progression of her underlying disease given lack of response from steroids and antibiotic therapy. Previous cultures from her bronchoscopy were negative. She is awaiting OFEV prescription.      Continue to follow with GI for GERD/scleroderma esophagus. Discussed elevation of the head of her bed to help with nocturnal symptoms. Continue with PPI. Her uncontrolled reflux is likely contributing to her worsening symptoms.      Continue Adcirca and PH follow-up.      Start levaquin 750 mg daily x 7 days. Will check respiratory culture. Resume MMF once antibiotics are complete.      5. RTC in 1 month or sooner if needed.        ASSESSMENT:      dcSSc MRSS 12 compared with 14 last visit  ILD as above HRCT chest I Sept  and PFTs improved  3/4/24  Now clinically respiratory approximately at baseline, Chronic foot ulcers, venous stasis, venous insufficiency and scleroderm  PH normal PAP on  RHC 3/16/23  ROXANNE       PLAN:     F/u Gastro Dr Benites for dysphagia and ROXANNE  Continue mycophenolate  suspension 1000mg twice daily o   F/u with Claire Pierre in [facility] PFTs in Sept ,   has started nintedanib 100mg twice daily  and tolerating     No need to add rituximab as recent exacerbation infectious rather than rapid progession of ILD  Ref to OT for hand Rx  Ref to PT for edema Rx, massage  Ref to Podiatry for foot ulcers, appropriate footwear  RTC 3 months with standing labs

## 2024-07-01 NOTE — PROGRESS NOTES
Subjective:      Patient ID: Yamel Shah is a 73 y.o. female.    Chief Complaint: follow up for scleroderma     Yamel Shah is a 73yo F with PMH of systemic sclerosis with interstitial lung disease, HFpEF, BLE venous insufficiency with chronic ulcers/wounds, s/p laser venous ablation (12/2020), GERD, Pagan's esophagus, s/p hernia repair (5/2023).    Rheum History:  - SSc with ILD dx in 1983  - Serologies: +SSA, +Scl-70  - Previously followed with podiatry and vascular surgery for the venous insufficiency and wounds, but now just cares for them herself  - Has chronically elevated sed rate and CRP  - RHC/LHC (3/2023) with normal pressures   - Follows with pulmonology Dr. Leigh, who was considering initiation of anti fibrotic therapy  - Follows with cardiology, last saw Claire Pelaez NP (9/2023)  - Was dx with covid while on a cruise on 8/28, was hospitalized in [place] for supportive care  - Had prolonged course of symptoms/illness from covid  - At 10/2023 visit, pt was feeling close to her baseline  - Most recent PFTs, 6MWT, TTE were done in 3/2024  - Has been dealing with respiratory infection again most recently as detailed below    Current Treatments:  - Mycophenolate mofetil liquid suspension 1000mg BID (2019-current)  - Ofev per pulm (4/2024-current)  - Tadalafil per pulm   - Rabeprazole 40mg daily per GI    Interval History:  Pt is here for follow up today. Reports doing well overall. No recent illness, cough, cold, or notable change in baseline symptoms. She deals with some chronic pain in the BLE around where she has the known wounds. The wounds continue to heal well. Pt reports tolerating all the listed meds well without any noted side effects.     Pt denies any new skin changes, she feels her chronic skin changes have remained stable. Ulcers on the feet/ankles are stable and she continues to care for them herself at home. Continues to have acid reflux and sometimes solid foods  get stuck in the esophagus, but this has remained stable.    Review of Systems   Constitutional:  Positive for unexpected weight change. Negative for fever.   HENT:  Positive for trouble swallowing. Negative for mouth sores.    Eyes:  Negative for redness.   Respiratory:  Negative for cough and shortness of breath.    Cardiovascular:  Negative for chest pain.   Gastrointestinal:  Negative for constipation and diarrhea.   Genitourinary:  Negative for dysuria and genital sores.   Skin:  Negative for rash.   Neurological:  Negative for headaches.   Hematological:  Bruises/bleeds easily.      Objective:   /64   Pulse 69   Wt 65 kg (143 lb 4.8 oz)   BMI 23.85 kg/m²   Physical Exam   Constitutional: She is oriented to person, place, and time. She appears well-developed and well-nourished. No distress.   HENT:   Head: Normocephalic and atraumatic.   Right Ear: External ear normal.   Left Ear: External ear normal.   Nose: Nose normal. No nasal congestion.   Mouth/Throat: Mucous membranes are moist. Oropharynx is clear.   Eyes: Conjunctivae are normal.   Cardiovascular: Normal rate, regular rhythm and normal heart sounds.   No murmur heard.  Pulmonary/Chest: Effort normal. No stridor. No respiratory distress. She has wheezes (mild inspiratory wheezes).   Pulmonary Comments: Velcro crackles present primarily at the lung bases. Pt also with mild rhonchi in the mid to lower lung fields as well.  Abdominal: Soft. She exhibits no distension. There is no abdominal tenderness.   Musculoskeletal:      Cervical back: Normal range of motion and neck supple.      Comments: No synovitis in any of the small or large joints. There is limited ROM of the hands and feet in setting of chronic scleroderma skin changes. 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 Thickened/tightened skin in multiple areas as detailed below in skin assessment. Bilateral feet with chronic slow healing wounds, wrapped.   Neurological: She is alert and  oriented to person, place, and time.   Skin: Skin is warm and dry.   MRSS 12. There is sclerodactyly with chronic skin thickened changes. Fingertips are dry. No pitting ulcers. Bilateral feet/ankles with chronic skin thickening and ulcer changes. No significant warmth, erythema, purulence.   Psychiatric: Her behavior is normal. Mood normal.   Vitals reviewed.    Modified Skin Score:    Face= 0  Chest= 0  Abdomen=0         Right Upper Arm= 1     Left Upper Arm= 1  Right Lower Arm= 0     Left Lower Arm= 0  Right Hand= 0              Left Hand= 0  Right Fingers= 1          Left Fingers= 1  Right Upper Leg= 0     Left Upper Leg=0  Right Lower Leg= 1     Left Lower Leg= 1  Right Foot= 3               Left Foot= 3     TOTAL: 12 (improved from 14 at last visit)     Assessment:     1. Scleroderma    2. Chronic pain disorder    3. Raynaud's disease without gangrene    4. Venous insufficiency of both lower extremities    5. Pulmonary hypertension associated with systemic disorder    6. ILD (interstitial lung disease)    7. PVD (peripheral vascular disease)    8. Immunosuppression due to drug therapy    9. Bilateral hand pain      - Pt with established SSc with ILD for many years  - Had previously been dealing with prolonged respiratory infections, previously in August-September 2023 and again in April 2024  - Lung functions have now returned to baseline after clearing these infectious issues  - She follows regularly with pulmonology, Dr. Leigh as well as the PH clinic  - Echo and lung function studies have been stable most recently    Plan:     Problem List Items Addressed This Visit          Neuro    Chronic pain disorder       Pulmonary    ILD (interstitial lung disease)       Cardiac/Vascular    Pulmonary hypertension associated with systemic disorder (Chronic)    Venous insufficiency of both lower extremities    Relevant Orders    Ambulatory referral/consult to Medical Massage Therapy    Ambulatory referral/consult to  Podiatry    Raynaud's disease without gangrene    Relevant Orders    Ambulatory referral/consult to Medical Massage Therapy    Ambulatory referral/consult to Physical/Occupational Therapy    Ambulatory referral/consult to Podiatry    PVD (peripheral vascular disease)    Relevant Orders    Ambulatory referral/consult to Medical Massage Therapy       Immunology/Multi System    Scleroderma - Primary    Relevant Orders    Ambulatory referral/consult to Medical Massage Therapy    Ambulatory referral/consult to Physical/Occupational Therapy    Immunosuppression due to drug therapy     Other Visit Diagnoses       Bilateral hand pain        Relevant Orders    Ambulatory referral/consult to Physical/Occupational Therapy          - Continue MMF 1000mg BID   - Continue with ofev and adcirca per pulm/PH teams and PPI per GI  - At this time, pt does not appear to require additional immunosuppressive agents  - If she does need further immune therapy in the future, could consider rituximab  - Referrals placed to PT/OT for hand therapy / braces, and medical massage therapy for the legs  - Referral placed to podiatry for assistance with any appropriate shoes/inserts/assistive devices to optimize wound healing and venous insufficiency    Follow up here in clinic in about 3 months or earlier if needed.    Assessment and plan discussed with supervising attending, Dr. Ahuja.      Saima Bennett MD  PGY-4, Rheumatology

## 2024-07-10 ENCOUNTER — CLINICAL SUPPORT (OUTPATIENT)
Dept: REHABILITATION | Facility: HOSPITAL | Age: 74
End: 2024-07-10
Payer: MEDICARE

## 2024-07-10 DIAGNOSIS — M34.9 SCLERODERMA: ICD-10-CM

## 2024-07-10 DIAGNOSIS — I73.00 RAYNAUD'S DISEASE WITHOUT GANGRENE: ICD-10-CM

## 2024-07-10 DIAGNOSIS — M79.641 BILATERAL HAND PAIN: ICD-10-CM

## 2024-07-10 DIAGNOSIS — M25.60 RANGE OF MOTION DEFICIT: Primary | ICD-10-CM

## 2024-07-10 DIAGNOSIS — M79.642 BILATERAL HAND PAIN: ICD-10-CM

## 2024-07-10 PROCEDURE — L3808 WHFO, RIGID W/O JOINTS: HCPCS

## 2024-07-10 PROCEDURE — 97760 ORTHOTIC MGMT&TRAING 1ST ENC: CPT

## 2024-07-10 NOTE — PROGRESS NOTES
[facility]  Occupational Therapy Orthotic Note    Patient: Yamel Shah  MRN: [id number]  Date of visit: 7/10/2024  Referring Physician:  Saima Bennett MD Next MD visit: date not set  Primary Diagnosis: I73.00 (ICD-10-CM) - Raynaud's disease without gangrene; M34.9 (ICD-10-CM) - Scleroderma; M79.641,M79.642 (ICD-10-CM) - Bilateral hand pain     Treatment Diagnosis:   Encounter Diagnoses   Name Primary?    Raynaud's disease without gangrene     Scleroderma     Bilateral hand pain     Range of motion deficit Yes     TIME RECORD    Start Time:  9:10 am  Stop Time:  10:05 am    PROCEDURES:      UNTIMED  Procedure Min.     45             Total Timed Minutes:  10 min  Total Timed Units:  1  Total Untimed Units:  1  Charges Billed/# of units:     x 1    Subjective:     Pt reports: she had an orthosis made a few years ago but needs something with more support to digits.     Pain: 0/10  Location: N/A    Objective:     Patient seen by OT this session for fabrication of orthosis. Fabricated and applied custom thermoplastic resting hand orthosis (static wrist hand finger orthosis, rigid w/o joints) with focus on maintaining digit extension and preventing further deformity.    Orthotic fit and training for 10 minutes, including:  - Pt instructed to wear at nighttime and when resting for passive digit extension stretch. Patient/caregiver were educated on orthosis purpose, wear schedule, care and precautions to monitor for increased pain/edema, pressure points, skin breakdown or redness/skin irritation. Patient/caregiver to contact clinic for adjustments as needed.   - Issued stockinette sleeves x 2 and foam padding to use as needed.    Assessment:     Orthosis with good fit following fabrication. Pt. Able to chanel/doff independently.      Patient Education/Response:     Pt instructed to wear as needed to minimize symptoms. Patient/caregiver instructed on orthosis purpose, wear  schedule, care and precautions to monitor for increased pain/edema, pressure points, skin breakdown or redness/skin irritation. Patient/caregiver to contact clinic for adjustments as needed. Patient acknowledged delivery and understanding of wear, care, and precautions.    Plans and Goals:     Goal of Orthosis to rest hand and prevent further deformity. Pt to D/C with orthosis. Orthosis Check PRN, f/u with MD at [facility].

## 2024-07-16 RX ORDER — ACETAMINOPHEN AND CODEINE PHOSPHATE 300; 60 MG/1; MG/1
1 TABLET ORAL NIGHTLY
Qty: 30 TABLET | Refills: 0 | Status: SHIPPED | OUTPATIENT
Start: 2024-07-16 | End: 2024-08-15

## 2024-07-16 RX ORDER — NIFEDIPINE 90 MG/1
TABLET, FILM COATED, EXTENDED RELEASE ORAL
Qty: 90 TABLET | Refills: 2 | Status: SHIPPED | OUTPATIENT
Start: 2024-07-16

## 2024-07-16 NOTE — TELEPHONE ENCOUNTER
Care Due:                  Date            Visit Type   Department     Provider  --------------------------------------------------------------------------------                                EP -                              PRIMARY      [facility] INTERNAL  Last Visit: 03-      [facility]   MEDICINE       Alypauline Rand                              EP -                              PRIMARY      [facility] INTERNAL  Next Visit: 10-      [facility]   MEDICINE       Aly GABRIEL Rand                                                            Last  Test          Frequency    Reason                     Performed    Due Date  --------------------------------------------------------------------------------    Lipid Panel.  12 months..  pravastatin..............  10-   10-    Vitamin D...  12 months..  ergocalciferol...........  10-   10-    [employer] Embedded Care Due Messages. Reference number: [id number].   7/16/2024 5:53:49 AM CDT

## 2024-07-16 NOTE — TELEPHONE ENCOUNTER
Provider Staff:  Action required for this patient    Requires labs      Please see care gap opportunities below in Care Due Message.    Thanks!  [facility]     Appointments      Date Provider   Last Visit   3/26/2024 Aly Rand MD   Next Visit   10/21/2024 Aly Rand MD     Refill Decision Note   Yamel Shah  is requesting a refill authorization.  Brief Assessment and Rationale for Refill:  Approve     Medication Therapy Plan: PER EPIC DATA PT. IS TAKING A TOTAL  MG DAILY(30 MG AND 90 MG)      Alert overridden per protocol: Yes   Comments:     Note composed:9:41 AM 07/16/2024

## 2024-07-16 NOTE — TELEPHONE ENCOUNTER
----- Message from Champ Renner sent at 7/16/2024 10:20 AM CDT -----  Regarding: meds  Can the clinic reply in [facility]: yes         Please refill the medication(s) listed below.         Please call the patient when the prescription(s) is ready for  at this phone number  Telephone Information:  Mobile          [phone]              Medication #1 acetaminophen-codeine 300-60mg (TYLENOL #4      Medication #2       Preferred Pharmacy:   [id number] - [facility] [address] [facility]  [address]  [place] [address]  Phone: [phone] Fax: [phone]

## 2024-07-16 NOTE — TELEPHONE ENCOUNTER
Patient requesting refill on acetaminophen-codeine 300-30mg (TYLENOL #3) 300-30 mg   Last office visit 04/29/2024   shows last refill on 05/04/2024  Patient does have a pain contract on file with [facility]  Patient last UDS 01/26/2024 was consistent with current therapy                 CODEINE  Present   Not Detected     Comment: INTERPRETIVE INFORMATION: Codeine, U  Positive Cutoff: 40 ng/mL  Methodology: Mass Spectrometry   MORPHINE  Present   Not Detected     Comment: INTERPRETIVE INFORMATION:Morphine, U  Positive Cutoff: 20 ng/mL  Methodology: Mass Spectrometry   6-ACETYLMORPHINE  Not Detected   Not Detected     Comment: INTERPRETIVE INFORMATION:6-acetylmorphine, U  Positive Cutoff: 20 ng/mL  Methodology: Mass Spectrometry   OXYCODONE  Not Detected   Not Detected     Comment: INTERPRETIVE INFORMATION:Oxycodone, U  Positive Cutoff: 40 ng/mL  Methodology: Mass Spectrometry   NOROYXCODONE  Not Detected   Not Detected     Comment: INTERPRETIVE INFORMATION:Noroxycodone, U  Positive Cutoff: 100 ng/mL  Methodology: Mass Spectrometry   OXYMORPHONE  Not Detected   Not Detected     Comment: INTERPRETIVE INFORMATION:Oxymorphone, U  Positive Cutoff: 40 ng/mL  Methodology: Mass Spectrometry   NOROXYMORPHONE  Not Detected   Not Detected     Comment: INTERPRETIVE INFORMATION:Noroxymorphone, U  Positive Cutoff: 100 ng/mL  Methodology: Mass Spectrometry   HYDROCODONE  Not Detected   Not Detected     Comment: INTERPRETIVE INFORMATION:Hydrocodone, U  Positive Cutoff: 40 ng/mL  Methodology: Mass Spectrometry   NORHYDROCODONE  Not Detected   Not Detected     Comment: INTERPRETIVE INFORMATION:Norhydrocodone, U  Positive Cutoff: 100 ng/mL  Methodology: Mass Spectrometry   HYDROMORPHONE  Not Detected   Not Detected     Comment: INTERPRETIVE INFORMATION:Hydromorphone, U  Positive Cutoff: 20 ng/mL  Methodology: Mass Spectrometry   BUPRENORPHINE  Not Detected   Not Detected     Comment: INTERPRETIVE  INFORMATION:Buprenorphine, U  Positive Cutoff: 5 ng/mL  Methodology: Mass Spectrometry   NORUBPRENORPHINE  Not Detected   Not Detected     Comment: INTERPRETIVE INFORMATION:Norbuprenorphine, U  Positive Cutoff: 20 ng/mL  Methodology: Mass Spectrometry   FENTANYL  Not Detected   Not Detected     Comment: INTERPRETIVE INFORMATION:Fentanyl, U  Positive Cutoff: 2 ng/mL  Methodology: Mass Spectrometry   NORFENTANYL  Not Detected   Not Detected     Comment: INTERPRETIVE INFORMATION:Norfentanyl, U  Positive Cutoff: 2 ng/mL  Methodology: Mass Spectrometry   MEPERIDINE METABOLITE  Not Detected   Not Detected     Comment: INTERPRETIVE INFORMATION:Meperidine metabolite, U  Positive Cutoff: 50 ng/mL  Methodology: Mass Spectrometry   TAPENTADOL  Not Detected   Not Detected     Comment: INTERPRETIVE INFORMATION:Tapentadol, U  Positive Cutoff: 100 ng/mL  Methodology: Mass Spectrometry   TAPENTADOL-O-SULF  Not Detected   Not Detected     Comment: INTERPRETIVE INFORMATION:Tapentadol-o-Sulf, U  Positive Cutoff: 200 ng/mL  Methodology: Mass Spectrometry   METHADONE  Negative   Not Detected     Comment: Presumptive negative by immunoassay. Testing by mass  spectrometry is available on request.  INTERPRETIVE INFORMATION: Methadone Screen, U  Positive Cutoff: 150 ng/mL  Methodology: Immunoassay   TRAMADOL  Negative   Not Detected     Comment: Presumptive negative by immunoassay. Testing by mass  spectrometry is available on request.  INTERPRETIVE INFORMATION:Tramadol Screen, U  Positive Cutoff: 100 ng/mL  Methodology: Immunoassay   AMPHETAMINE  Not Detected   Not Detected     Comment: INTERPRETIVE INFORMATION:Amphetamine, U  Positive Cutoff: 50 ng/mL  Methodology: Mass Spectrometry   METHAMPHETAMINE  Not Detected   Not Detected     Comment: INTERPRETIVE INFORMATION:Methamphetamine, U  Positive Cutoff: 200 ng/mL  Methodology: Mass Spectrometry   MDMA- ECSTASY  Not Detected   Not Detected     Comment: INTERPRETIVE INFORMATION:MDMA,  U  Positive Cutoff: 200 ng/mL  Methodology: Mass Spectrometry   MDA  Not Detected   Not Detected     Comment: INTERPRETIVE INFORMATION:MDA, U  Positive Cutoff: 200 ng/mL  Methodology: Mass Spectrometry   MDEA- Mickie  Not Detected   Not Detected     Comment: INTERPRETIVE INFORMATION:MDEA, U  Positive Cutoff: 200 ng/mL  Methodology: Mass Spectrometry   METHYLPHENIDATE  Not Detected   Not Detected     Comment: INTERPRETIVE INFORMATION:Methylphenidate, U  Positive Cutoff: 100 ng/mL  Methodology: Mass Spectrometry   PHENTERMINE  Not Detected   Not Detected     Comment: INTERPRETIVE INFORMATION:Phentermine, U  Positive Cutoff: 100 ng/mL  Methodology: Mass Spectrometry   BENZOYLECGONINE  Negative   Not Detected     Comment: Presumptive negative by immunoassay. Testing by mass  spectrometry is available on request.  INTERPRETIVE INFORMATION:Cocaine Screen, U  Positive Cutoff: 150 ng/mL  Methodology: Immunoassay   ALPRAZOLAM  Not Detected   Not Detected     Comment: INTERPRETIVE INFORMATION:Alprazolam, U  Positive Cutoff: 40 ng/mL  Methodology: Mass Spectrometry   ALPHA-OH-ALPRAZOLAM  Not Detected   Not Detected     Comment: INTERPRETIVE INFORMATION:Alpha-OH-Alprazolam, U  Positive Cutoff: 20 ng/mL  Methodology: Mass Spectrometry   CLONAZEPAM  Not Detected   Not Detected     Comment: INTERPRETIVE INFORMATION:Clonazepam, U  Positive Cutoff: 20 ng/mL  Methodology: Mass Spectrometry   7-AMINOCLONAZEPAM  Not Detected   Not Detected     Comment: INTERPRETIVE INFORMATION:7-Aminoclonazepam, U  Positive Cutoff: 40 ng/mL  Methodology: Mass Spectrometry   DIAZEPAM  Not Detected   Not Detected     Comment: INTERPRETIVE INFORMATION:Diazepam, U  Positive Cutoff: 50 ng/mL  Methodology: Mass Spectrometry   NORDIAZEPAM  Not Detected   Not Detected     Comment: INTERPRETIVE INFORMATION:Nordiazepam, U  Positive Cutoff: 50 ng/mL  Methodology: Mass Spectrometry   OXAZEPAM  Not Detected   Not Detected     Comment: INTERPRETIVE  INFORMATION:Oxazepam, U  Positive Cutoff: 50 ng/mL  Methodology: Mass Spectrometry   TEMAZEPAM  Not Detected   Not Detected     Comment: INTERPRETIVE INFORMATION:Temazepam, U  Positive Cutoff: 50 ng/mL  Methodology: Mass Spectrometry   Lorazepam  Not Detected   Not Detected     Comment: INTERPRETIVE INFORMATION:Lorazepam, U  Positive Cutoff: 60 ng/mL  Methodology: Mass Spectrometry   MIDAZOLAM  Not Detected   Not Detected     Comment: INTERPRETIVE INFORMATION:Midazolam, U  Positive Cutoff: 20 ng/mL  Methodology: Mass Spectrometry   ZOLPIDEM  Not Detected   Not Detected     Comment: INTERPRETIVE INFORMATION:Zolpidem, U  Positive Cutoff: 20 ng/mL  Methodology: Mass Spectrometry   BARBITURATES  Negative   Not Detected     Comment: Presumptive negative by immunoassay. Testing by mass  spectrometry is available on request.  INTERPRETIVE INFORMATION:Barbiturates Screen, U  Positive Cutoff: 200 ng/mL  Methodology: Immunoassay   Creatinine, Urine 20.0 - 400.0 mg/dL 112.0 47.0 R 103.0 R 344.5 139.0 R,  R   ETHYL GLUCURONIDE  Negative   Present     Comment: Presumptive negative by immunoassay. Testing by mass  spectrometry is available on request.  INTERPRETIVE INFORMATION:Ethyl Glucuronide Screen, U  Positive Cutoff: 500 ng/mL  Methodology: Immunoassay   MARIJUANA METABOLITE  Negative   Not Detected     Comment: Presumptive negative by immunoassay. Testing by mass  spectrometry is available on request.  INTERPRETIVE INFORMATION: THC (Cannabinoids) Screen, U  Positive Cutoff: 50 ng/mL  Methodology: Immunoassay   PCP  Negative   Not Detected     Comment: Presumptive negative by immunoassay. Testing by mass  spectrometry is available on request.  INTERPRETIVE INFORMATION:Phencyclidine Screen, U  Positive Cutoff: 25 ng/mL  Methodology: Immunoassay   CARISOPRODOL  Negative   Not Detected CM     Comment: Presumptive negative by immunoassay. Testing by mass  spectrometry is available on request.  INTERPRETIVE INFORMATION:  Carisoprodol Screen, U  Positive Cutoff: 100 ng/mL  Methodology: Immunoassay  The carisoprodol immunoassay has cross-reactivity to  carisoprodol and meprobamate.   Naloxone  Not Detected   Not Detected     Comment: INTERPRETIVE INFORMATION:Naloxone, U  Positive Cutoff: 100 ng/mL  Methodology: Mass Spectrometry   Gabapentin  Not Detected   Not Detected     Comment: INTERPRETIVE INFORMATION:Gabapentin, U  Positive Cutoff: 3,000 ng/mL  Methodology: Mass Spectrometry   Pregabalin  Present   Present     Comment: INTERPRETIVE INFORMATION:Pregabalin, U  Positive Cutoff: 3,000 ng/mL  Methodology: Mass Spectrometry   Alpha-OH-Midazolam  Not Detected   Not Detected     Comment: INTERPRETIVE INFORMATION:Alpha-OH-Midazolam, U  Positive Cutoff: 20 ng/mL  Methodology: Mass Spectrometry   Zolpidem Metabolite  Not Detected   Not Detected     Comment: INTERPRETIVE INFORMATION:Zolpidem Metabolite, U  Positive Cutoff: 100 ng/mL  Methodology: Mass Spectrometry   PAIN MANAGEMENT DRUG PANEL  See Below   See Below CM

## 2024-07-25 ENCOUNTER — CLINICAL SUPPORT (OUTPATIENT)
Dept: REHABILITATION | Facility: HOSPITAL | Age: 74
End: 2024-07-25
Payer: MEDICARE

## 2024-07-25 DIAGNOSIS — M25.60 RANGE OF MOTION DEFICIT: Primary | ICD-10-CM

## 2024-07-25 PROCEDURE — 97763 ORTHC/PROSTC MGMT SBSQ ENC: CPT

## 2024-07-25 NOTE — PROGRESS NOTES
[facility]  Occupational Therapy Orthotic Note    Patient: Yamel Shah  MRN: [id number]  Date of visit: 7/25/2024  Referring Physician:  Saima Bennett MD Next MD visit: date not set  Primary Diagnosis: I73.00 (ICD-10-CM) - Raynaud's disease without gangrene; M34.9 (ICD-10-CM) - Scleroderma; M79.641,M79.642 (ICD-10-CM) - Bilateral hand pain     Treatment Diagnosis:   Encounter Diagnosis   Name Primary?    Range of motion deficit Yes       TIME RECORD    Start Time:  9:10 am  Stop Time:  9:28 am    PROCEDURES:      Total Timed Minutes:  18 min  Total Timed Units:  1    Subjective:     Pt reports: orthosis needs more contoured support at the digits.     Pain: 0/10  Location: N/A    Objective:     Orthotic fit and training for 18 minutes, including:  - Assessed orthosis fit and applied edge taping over foam padding for increased comfort and to secure foam support.   - Pt instructed to wear at nighttime and when resting for passive digit extension stretch. Patient/caregiver were educated on orthosis purpose, wear schedule, care and precautions to monitor for increased pain/edema, pressure points, skin breakdown or redness/skin irritation. Patient/caregiver to contact clinic for adjustments as needed.     Assessment:     Orthosis with good fit. Pt able to chanel/doff independently.      Patient Education/Response:     Pt instructed to wear as needed to minimize symptoms. Patient/caregiver instructed on orthosis purpose, wear schedule, care and precautions to monitor for increased pain/edema, pressure points, skin breakdown or redness/skin irritation. Patient/caregiver to contact clinic for adjustments as needed. Patient acknowledged delivery and understanding of wear, care, and precautions.    Plans and Goals:     Goal of Orthosis to rest hand and prevent further deformity. Pt to D/C with orthosis. Orthosis Check PRN, f/u with MD at [facility].

## 2024-07-30 ENCOUNTER — OFFICE VISIT (OUTPATIENT)
Dept: PAIN MEDICINE | Facility: CLINIC | Age: 74
End: 2024-07-30
Payer: MEDICARE

## 2024-07-30 ENCOUNTER — HOSPITAL ENCOUNTER (OUTPATIENT)
Dept: RADIOLOGY | Facility: OTHER | Age: 74
Discharge: HOME | End: 2024-07-30
Attending: INTERNAL MEDICINE
Payer: MEDICARE

## 2024-07-30 VITALS
WEIGHT: 149.06 LBS | DIASTOLIC BLOOD PRESSURE: 56 MMHG | HEART RATE: 56 BPM | RESPIRATION RATE: 16 BRPM | BODY MASS INDEX: 24.8 KG/M2 | SYSTOLIC BLOOD PRESSURE: 107 MMHG

## 2024-07-30 DIAGNOSIS — Z12.31 SCREENING MAMMOGRAM FOR BREAST CANCER: ICD-10-CM

## 2024-07-30 DIAGNOSIS — M34.89 CUTANEOUS SCLERODERMA: ICD-10-CM

## 2024-07-30 DIAGNOSIS — Z79.891 ENCOUNTER FOR MONITORING OPIOID MAINTENANCE THERAPY: ICD-10-CM

## 2024-07-30 DIAGNOSIS — M79.18 MYOFASCIAL PAIN: ICD-10-CM

## 2024-07-30 DIAGNOSIS — Z51.81 ENCOUNTER FOR MONITORING OPIOID MAINTENANCE THERAPY: ICD-10-CM

## 2024-07-30 DIAGNOSIS — G60.9 IDIOPATHIC NEUROPATHY: ICD-10-CM

## 2024-07-30 DIAGNOSIS — G89.4 CHRONIC PAIN DISORDER: Primary | ICD-10-CM

## 2024-07-30 PROCEDURE — 99214 OFFICE O/P EST MOD 30 MIN: CPT | Mod: PBBFAC | Performed by: NURSE PRACTITIONER

## 2024-07-30 PROCEDURE — 77067 SCR MAMMO BI INCL CAD: CPT | Mod: TC,TXP

## 2024-07-30 PROCEDURE — 80307 DRUG TEST PRSMV CHEM ANLYZR: CPT | Mod: TXP | Performed by: NURSE PRACTITIONER

## 2024-07-30 PROCEDURE — 99999 PR PBB SHADOW E&M-EST. PATIENT-LVL IV: CPT | Mod: PBBFAC,,, | Performed by: NURSE PRACTITIONER

## 2024-07-30 PROCEDURE — 80347 BENZODIAZEPINES 13 OR MORE: CPT | Mod: TXP | Performed by: NURSE PRACTITIONER

## 2024-07-30 PROCEDURE — 77063 BREAST TOMOSYNTHESIS BI: CPT | Mod: TC,TXP

## 2024-07-30 PROCEDURE — 99214 OFFICE O/P EST MOD 30 MIN: CPT | Mod: S$PBB,,, | Performed by: NURSE PRACTITIONER

## 2024-07-30 PROCEDURE — 80355 GABAPENTIN NON-BLOOD: CPT | Mod: TXP | Performed by: NURSE PRACTITIONER

## 2024-07-30 RX ORDER — NABUMETONE 750 MG/1
750 TABLET, FILM COATED ORAL DAILY PRN
Qty: 30 TABLET | Refills: 3 | Status: SHIPPED | OUTPATIENT
Start: 2024-07-30

## 2024-07-30 RX ORDER — TIZANIDINE 4 MG/1
8 TABLET ORAL 2 TIMES DAILY PRN
Qty: 60 TABLET | Refills: 3 | Status: SHIPPED | OUTPATIENT
Start: 2024-07-30

## 2024-07-30 NOTE — PROGRESS NOTES
Chronic Pain - Follow Up          Chief Complaint:   Chief Complaint   Patient presents with    Foot Pain        SUBJECTIVE: Disclaimer: This note has been generated using voice-recognition software. There may be typographical errors that have been missed during proof-reading    Interval History 7/30/2024:  The patient returns to clinic today for follow up of foot pain. She continues to report bilateral foot pain. She continues to have bilateral wounds to her feet. She does have worsened pain with certain shoes. She is currently taking Lyrica, Relafen, and Zanaflex with benefit. She also takes Tylenol #4 with benefit. She denies any adverse effects. She denies any other health changes. Her pain today is 6/10.    Interval History 4/29/2024:  The patient returns to clinic today for follow up of foot pain. She continues to report bilateral foot pain. Pain was more significant this weekend with swelling (R>L), denies inciting incident. Reported 7/10 pain. Improved with ace wrap on leg and increasing dose of tylenol #3 and tizanidine. Current pain 6/10. She is currently taking Lyrica, Relafen, Zanaflex and Tylenol #3 as needed with benefit. She denies any other health changes. Patient denies red flags including weakness, unexpected weight loss/gain, night sweats/fevers, saddle anesthesia, and symptoms of UBALDO.    Interval History 1/26/2024:  The patient returns to clinic today for follow up of foot pain. She continues to report bilateral foot pain. This pain is worse at night. She does sometimes wake up due to pain. She continues to have open wounds, currently on her ankles. She is currently taking Lyrica, Relafen, and Zanaflex. She also takes Tylenol #3 as needed with benefit. She denies any other health changes. Her pain today is 5/10.    Interval History 10/27/2023:  The patient returns to clinic today for follow up of foot pain. She continues to report bilateral foot pain. She describes this pain as burning and  stinging in nature. She now has sores under her ankles. She is having trouble laying on her sides at night due to pain. The sores by her toes are healing. She continues to report benefit with home medication regimen. She is taking Lyrica, Zanaflex, and Relafen. She also takes Tylenol #3 with benefit. She is out of this medication. She denies any adverse effects. She denies any other health changes. Her pain today is 5/10.     Interval History 7/28/2023:  The patient returns to clinic today for follow up of neck and foot pain via virtual visit. She continues to report bilateral foot pain. This is worse at night, described as burning in nature. She does report some new ulcers to her feet. She does have a home wound care regimen. She continues to report intermittent neck pain. She is taking Lyrica, Zanaflex, and Relafen with benefit. She also takes Tylenol #3 as needed for severe pain with benefit and without adverse effects. She denies any other health changes.     Interval History 4/28/2023:  The patient returns to clinic today for follow up of neck and foot pain. Since last visit, she has had pneumonia. She also had hiatal hernia repair. She continues to report bilateral foot pain. This is burning in nature. Her pain is worse at night. She reports intermittent neck pain. She continues to take Lyrica, Zanaflex, and Relafen with benefit. She also takes Tylenol #3 for severe pain. She denies any adverse effects. She denies any other health changes. Her pain today is 4/10.    Interval History 12/30/2022:  The patient returns to clinic today for follow up of neck and foot pain. She reports increased foot pain over the last 2 months. She continues to report ulcers to her feet. She reports bilateral foot pain. Her pain is worse at night. She reports intermittent neck pain. She is taking Lyrica, Zanaflex, and Relafen with benefit. She also takes Tylenol #3 with benefit. She denies any adverse effects. She denies any other  health changes. Her pain today is 6/10.    Interval History 8/26/2022:  The patient returns to clinic today for follow up neck and foot pain. She reports increased pain over the last few days. She attributes this as she is out of Lyrica. She continues to report bilateral foot pain. She continues to have wounds. She continues to perform wound care. She continues to report intermittent neck pain. She continues to take Lyrica, Zanaflex, and Relafen with benefit. She continues to take Tylenol #3 for severe pain as needed with benefit. She denies any adverse effects. She denies any other health changes. Her pain today is 4/10.    Interval History 5/26/2022:  The patient returns to clinic today for follow up of neck and foot pain via virtual visit. She continues to report bilateral foot pain. She continues to report ulcers to her feet. She continues to report neck pain with intermittent radiating pain into her arms. She continues to report benefit with current medications. She is taking Lyrica, Zanaflex, and Relafen. She also takes Tylenol #3 with benefit. She denies any adverse effects with these medications. She continues to perform a home exercise routine. She denies any other health changes.     Interval History 2/15/22  Patient presents in follow up. Was previously Dr. King patient with primary complaints of neck and foot pain. She reports her pain has been stable since her last visit. She did have covid in January and stopped all of ehr pain medications. She got an antibody infusion. She has fully recovered. She restarted her pain medications and reports that they are effective. She is taking Tylenol 3 daily, nambumetome 750 mg BID, lyrica 150 mg BID and Zanaflex 4 mg TID. She did do PT for her neck November to January per her report. She continues with mild neck pain without radiation into the arms or hands. Pain is worse with sidebending and rotation to the right and better with rest and medications. She denies  any numbness tingling weakness or b/b incontinence.    Interval History 1/4/2022:  The patient returns to clinic today for follow up of foot pain via virtual visit. She continues to report foot pain and wounds. She continues to follow up with podiatry and wound care. She reports increased neck pain. This pain is tight and aching in nature. She denies any radicular arm pain. She is currently taking Relafen, Lyrica, and Tylenol #3 with benefit and without adverse effects. She reports limited benefit with Zanaflex. She denies any other health changes.     Interval History 12/8/2021:  The patient returns to clinic today for foot pain via virtual visit. She continues to report foot pain. She does have foot wounds. She recently saw Dr. Claudio in [facility]. He does not recommend any vascular interventions at this time. She continues to follow up with Podiatry and wound care. She continues to take Zanaflex, Relafen, Lyrica and Tylenol #3 with benefit. She denies any adverse effects. She denies any other health changes. Her pain today is 7/10.    Interval History 11/8/2021:  The patient returns to clinic today for follow up of foot pain via virtual visit. At last visit, she was starting on Tylenol #3. She does find benefit with this. She sometimes breaks this in half. She continues to take Zanaflex, Relafen, and Lyrica. She continues to report bilateral foot pain and ulcers. She continues to follow up with podiatry. Her pain is worse at night. She denies any other health changes. Her pain today is 7/10.    Interval History 10/20/2021:  The patient returns to clinic today for follow up of foot pain. At last visit, she was started on Tramadol. She did have relief but had significant side effects. She experienced sedation, itching, headaches, and decreased appetite. This has resolved after stopping the medication. She continues to report foot pain. This pain is worse at night. She continues to follow up with podiatry. She  continues to take Tizanidine, Relafen, and Lyrica with some benefit. She denies any adverse effects. She denies any other health changes. Her pain today is 8/10.    Interval history 10/06/2021:  Since previous encounter the patient continues to have nonhealing wound has been followed up with wound care and is scheduled to follow with Rheumatology for the wounds in her legs she was referred to podiatry with stated that they have done nothing different me that she with doing on her own.  She continues to have neck pain and bilateral foot pain.  She has been taking tizanidine Relafen Tylenol p.m. and Lyrica 600 mg per day.  She states that all these medications are helpful.    Interval History 6/3/2021:  The patient returns to clinic today for follow up of foot pain. She continues to report left foot pain secondary to ulcer. She is seeing Wound Care. She describes this pain as burning in nature. Her pain is worse with prolonged activity. She reports intermittent neck pain. She continues to take Zanaflex. She reports limited benefit with increased Lyrica dose. She denies any other health changes. Her pain today is 5/10.    Interval History 5/5/2021:  The patient returns to clinic today for follow up of foot pain. She reports a new ulcer on her left foot. She is seeing Wound Care. She reports increased pain with this new ulcer. She describes this pain as burning. She continues to report neck pain that is tight and aching. She denies any radicular arm pain. Her pain is worse with prolonged activity, especially turning her head to the side. She continues to perform a home exercise routine. She continues to take Lyrica and Zanaflex with benefit. She denies any other health changes. Her pain today is 5/10.    Interval History 2/8/2021:  The patient returns to clinic today for follow up of neck and foot pain. She reports that her neck pain has significantly improved since last visit. She has recently completed physical  therapy for this. She is performing a home stretching routine. She reports intermittent neck pain that is tight and aching in nature. She denies any radicular arm pain. She continues to report bilateral foot pain. This is worse at night. She continues to take Lyrica and Zanaflex with benefit. She denies any other health changes. Her pain today is 4/10.    Interval History 1/8/2021:  The patient returns to clinic today for follow up of neck and foot pain. She continues to report neck pain that is tight and aching in nature. She denies any radicular pain. She continues to participate in physical therapy and a home exercise routine. She continues to report bilateral foot pain, worse at night. She reports that increased Lyrica dose has provided improved benefit. She also takes Zanaflex with benefit. She denies any other health changes. Her pain today is 3/10.    Interval History 11/13/2020:  The patient returns to clinic today for follow up of neck and foot pain. She continues to report bilateral foot pain. Since last visit, she had a venous ablation procedure on her right leg through Vascular. She is scheduled for the left side in the next month. She continues to report bilateral foot pain, worse at night. She continues to report neck pain that is tight and aching in nature. She denies any radicular pain. Her pain is worse flexion and turning her head to the side. She is currently participating in physical therapy with some benefit. She continues to take Lyrica but does ask if this could be increased. She continues to take Zanaflex with benefit. She denies any other health changes. Her pain today is 5/10    Interval History 10/2/2020:  The patient returns to clinic today for follow up of foot pain. She reports increased bilateral foot pain over the last few months. This pain is burning in nature. This pain is worse at night. She continues to have ulcers to her feet related to her scleroderma. She would like to restart  the Lyrica. She also reports increased neck pain that is sore and aching in nature. She denies any radiating arm pain. This is worse with turning her head and at night. She denies any other health changes. Her pain today is 7/10.    Interval History 6/5/2020:  The patient requests audio visit today for follow up of bilateral foot pain. She reports intermittent foot pain that is tolerable at this time. She has discontinued Lyrica as of April. She continues to have ulcers to her feet. She does have wound care. She denies any other health changes.     Interval History 1/17/2020:  The patient returns to clinic today for follow up. She continues to report bilateral foot pain. She describes this pain as burning and tingling in nature. At last visit, we decreased her Lyrica dose to 100 mg at night. She reports increased pain since then. She would like to go to back to the 150 mg dose. She continues to have ulcers to her feet, left worse than right. She continues to participate in a home wound care program. She denies any other health changes. Her pain today is 6/10.    Interval History 12/17/2019:  The patient returns to clinic today for follow up. She reports improving foot pain. She reports improved healing ulcers to her left foot. She continues to report right foot pain that is burning and tingling in nature. She continues to participate in a home wound care routine. She continues to take Lyrica. She asks about decreasing this. She denies any other health changes. Her pain today is 5/10.    Interval History 9/17/2019:  The patient returns to clinic today for follow up. She continues to report bilateral foot pain that is burning and tingling in nature. Her pain is worse with sitting and at night. She continues to have slow healing ulcers to both feet. She continues to perform a home wound care program. She is no longer taking Gabapentin which was previously called in. She is now taking Pregabalin generic with benefit.  She denies any other health changes. Her pain today is 4/10.    Interval History 6/13/2019:  The patient returns to clinic for follow up for bilateral foot pain. She continues to report bilateral foot pain that is burning in nature. She continues to have slow healing wounds to both feet from ulcers. She continues to take Lyrica once daily but reports increased pain. She continues to take Vitamin B12. She denies any other health changes. Her pain today is 8/10.    Interval History 3/13/2019:  The patient returns to clinic today for follow up. She continues to report bilateral foot pain that is burning and tingling in nature. Her pain is worse with prolonged walking and standing. She continues to have bilateral foot wounds. She reports that these wounds have significantly improved since last visit. She is currently taking Vitamin B12 with benefit. She continues to report benefit with Lyrica. She is currently taking this once daily. She denies any other health changes. Her pain today is 5/10.    Interval History 2/6/2019:  The patient returns to clinic today for follow up. She reports that her bilateral foot wounds have significantly improved since last visit. She does report some significant improvement in her foot pain. She reports intermittent bilateral foot pain especially with prolonged walking. She describes this pain as heavy and tingling in nature. She is no longer taking Celebrex. She is currently taking Lyrica 150 mg BID with benefit. She does report that the Lyrica is expensive for her. She denies any other health changes. Her pain today is 5/10.    Interval History 12/5/18:  Patient returns to clinic today for follow up. She reports that since increasing her medications, she is having significant improvement in pain during the day time. She is currently taking Lyrica 75mg TID and Celebrex 200mg TID. However, she states that the burning  And tingling pain in both her feet increase in the evening around 6 or  7pm. She is unable to sleep during the nights due to the pain. She is still wearing her bilateral boots due to non healing ulcers from her scleroderma.     Interval History 8/30/2018:  The patient returns to clinic today for follow up. She continues to report bilateral foot pain that is burning and tingling in nature. She reports that this pain is worse at night. She is currently taking Lyrica 75 mg BID with limited relief. She did not have any benefit with Mobic. She continues to take Aleve with some benefit. She continues to have nonhealing ulcers. She wears an ACE bandage to her right calf, as well as boots to her bilateral feet. She denies any other health changes. Her pain today is 7/10.     Interval History 7/11/2018:  Since previous encounter she has weaned from gabapentin to 600mg / day and did not notice significant worsening of pain, but was having somnelence from higher doses of the medication in the past.  We are weaning in an attempt to trial Lyrica as an alternative to gabapentin.  Tramadol causes significant sedation, limiting its use.  She has discontinued the use of the tramadol.  Additionally she does have benefit from anti-inflammatory medication, has been using aleve, has not trialed CANTRELL-2 inhibitors in the past.    Interval history 05/16/2018:      The patient has had x-rays of the lumbar spine which did not reveal any evidence of significant neuroforaminal stenosis with degenerative disc disease.  There is mild facet arthropathy.  She has escalated her gabapentin and is currently taking 2100 mg of gabapentin per day without any noticeable improvement but daytime somnolence.  She continues to have nonhealing ulcers and topical pain cream is helping to a limited degree over her leg in areas where there is no skin disruption.    Initial encounter:    Yamel Shah presents to the clinic for the evaluation of foot pain. The pain started 2 years ago following ulcers and symptoms have  been worsening.    Brief history: History of scleroderma    Pain Description:    The pain is located in the both feet in the area of slowly healing chronic foot ulcers which were partly from venous insufficiency and stasis associated with her scleroderma.  In her right lower extremity she describes radicular symptoms in the L4/5 distribution.    At BEST  5/10     At WORST  10/10 on the WORST day.      On average pain is rated as 8/10.     Today the pain is rated as 8/10    The pain is described as burning, sharp, shooting and constant       Symptoms interfere with daily activity, sleeping and work.     Exacerbating factors: Laying, Walking, Night Time, Morning and Getting out of bed/chair.      Mitigating factors medications.     Patient denies night fever/night sweats, urinary incontinence, bowel incontinence, significant weight loss, significant motor weakness and loss of sensations.  Patient denies any suicidal or homicidal ideations    Pain Medications:  Current:  Lyrica 300 mg daily  Zanaflex  Relafen  Tylenol #3    Tried in Past:  NSAIDs -Aleve, Mobic, Celebrex  TCA -Never  SNRI -Never  Anti-convulsants - Gabapentin, Lyrica  Muscle Relaxants -Never  Opioids-Tramadol    Physical Therapy/Home Exercise: no       report:  Reviewed and consistent with medication use as prescribed.    Pain Procedures: none    Chiropractor -never  Acupuncture - never  TENS unit -never  Spinal decompression -never  Joint replacement -never    Imaging:   Xray Cervical Spine 12/6/2019:  FINDINGS:  Odontoid prevertebral soft tissues and posterior elements are intact.  Neural foramina are patent.  No fracture dislocation bone destruction seen.  There is mild DJD.     Impression:     Mild DJD.    X-ray lumbar spine 6/1/2016:  2 views: Alignment is normal. There is mild DJD. No fracture dislocation bone destruction seen.   Impression      Mild DJD.     Xray lumbar spine 4/2018:  COMPARISON:  June 2016    FINDINGS:  There is slight  curvature of the lumbar spine.  The vertebral bodies are normally aligned and normal in height.  Mild disc space narrowing present at L5-S1.  There is mild facet degenerative change in the lower spine.  No significant osteophytic spurring present.  There is no change in alignment with flexion or extension.      Past Medical History:   Diagnosis Date    Abnormal Pap smear     Acid reflux     Allergy     Arthritis     Encounter for blood transfusion     GERD (gastroesophageal reflux disease)     Hiatal hernia 03/24/2023    History of migraine headaches     Hx of colonic polyp     Hypertension     Idiopathic neuropathy 07/20/2012    ILD (interstitial lung disease) 11/06/2013    Iron deficiency anemia 03/18/2014    MRSA carrier     Osteopenia     Pneumonia     Pulmonary fibrosis     Pulmonary hypertension     Raynaud's disease     Scleroderma, diffuse     Sjogren's syndrome     Vitamin D deficiency 11/14/2013     Past Surgical History:   Procedure Laterality Date    24 HOUR IMPEDANCE PH MONITORING OF ESOPHAGUS IN PATIENT NOT TAKING ACID REDUCING MEDICATIONS N/A 03/04/2020    Procedure: IMPEDANCE PH STUDY, ESOPHAGEAL, 24 HOUR, IN PATIENT NOT TAKING ACID REDUCING MEDICATION;  Surgeon: Annamaria Mendoza MD;  Location: [facility] (4TH FLR);  Service: Endoscopy;  Laterality: N/A;  OFF PPI/H2 Blocker   Motility Studies   Hold Narcotics x 1 days   Hold TCA x 1 days  2/26 - LVM attempting to confirm appt  2/27 - Confirmed appt    ANORECTAL MANOMETRY N/A 05/10/2021    Procedure: MANOMETRY, ANORECTAL with balloon expulsion test;  Surgeon: Annamaria Mendoza MD;  Location: [facility] (4TH FLR);  Service: Endoscopy;  Laterality: N/A;  order combined  covid test 5/7 [employer], instructions emailed-[facility]    BREAST BIOPSY      Left, benign    BRONCHOSCOPY N/A 10/2/2023    Procedure: bronch;  Surgeon: Roberta Leigh DO;  [facility];  Service: Endoscopy;  Laterality: N/A;    CERVICAL CONIZATION   W/ LASER  1970     COLONOSCOPY      COLONOSCOPY N/A 03/29/2019    Procedure: COLONOSCOPY;  Surgeon: Annamaria Mendoza MD;  Location: [facility] (2ND FLR);  Service: Endoscopy;  Laterality: N/A;    COLONOSCOPY N/A 05/10/2021    Procedure: COLONOSCOPY;  Surgeon: Annamaria Mendoza MD;  Location: [facility] (4TH FLR);  Service: Endoscopy;  Laterality: N/A;  2nd floor-previous scopes done on 2nd floor, gastroparesis  full liquid diet x2 days, clear liquid x1 day prior to procedure  covid test 5/7 [employer], instructions emailed-[facility]  5/6 pt confirmed appt-[facility]    DILATION AND CURETTAGE OF UTERUS      ESOPHAGEAL MANOMETRY WITH MEASUREMENT OF IMPEDANCE N/A 03/04/2020    Procedure: MANOMETRY, ESOPHAGUS, WITH IMPEDANCE MEASUREMENT;  Surgeon: Annamaria Mendoza MD;  Location: [facility] (4TH FLR);  Service: Endoscopy;  Laterality: N/A;  OFF PPI/H2 Blocker   Motility Studies   Hold Narcotics x 1 days   Hold TCA x 1 days    ESOPHAGOGASTRODUODENOSCOPY      ESOPHAGOGASTRODUODENOSCOPY N/A 03/29/2019    Procedure: EGD (ESOPHAGOGASTRODUODENOSCOPY);  Surgeon: Annamaria Mendoza MD;  Location: [facility] (2ND FLR);  Service: Endoscopy;  Laterality: N/A;  pulmonary htn    ESOPHAGOGASTRODUODENOSCOPY N/A 02/02/2021    Procedure: ESOPHAGOGASTRODUODENOSCOPY (EGD);  Surgeon: Annamaria Mendoza MD;  Location: [facility] (2ND FLR);  Service: Endoscopy;  Laterality: N/A;  2nd floor gastroparesis  3 days full liquid diet and 1 day clears  covid test 1/30 primary care, instructions sent to [facility]    ESOPHAGOGASTRODUODENOSCOPY N/A 07/01/2022    Procedure: ESOPHAGOGASTRODUODENOSCOPY (EGD);  Surgeon: Annamaria Mendoza MD;  Location: [facility] (2ND FLR);  Service: Endoscopy;  Laterality: N/A;  2nd floor-gastroparesis  full liquid diet x3 days, clear liquid diet x1 day prior to procedure  fully vaccinated, instructions sent to [facility]  6/29-pt confirmed new arrival time-vt    EXCISION, LESION, STOMACH, LAPAROSCOPIC N/A 03/23/2023    Procedure: XI ROBOTIC EXCISION,  LESION, STOMACH;  Surgeon: Katherine Segura MD;  Location: [facility] FLR;  Service: General;  Laterality: N/A;    EXCISIONAL BIOPSY, LYMPH NODE Right 05/26/2023    Procedure: EXCISIONAL BIOPSY, LYMPH NODE, INGUINAL;  Surgeon: Katherine Segura MD;  [facility];  Service: General;  Laterality: Right;    HYSTERECTOMY  1989    FRANDY (AUB, Fibroids), ovaries remain    REPAIR, HERNIA, UMBILICAL N/A 03/23/2023    Procedure: REPAIR, HERNIA, UMBILICAL;  Surgeon: Katherine Segura MD;  [facility];  Service: General;  Laterality: N/A;    RIGHT HEART CATHETERIZATION Right 01/05/2021    Procedure: INSERTION, CATHETER, RIGHT HEART;  Surgeon: Aureliano Sy MD;  Location: [facility] CATH LAB;  Service: Cardiology;  Laterality: Right;    RIGHT HEART CATHETERIZATION Right 03/16/2023    Procedure: INSERTION, CATHETER, RIGHT HEART;  Surgeon: Aureliano Sy MD;  Location: [facility] CATH LAB;  Service: Cardiology;  Laterality: Right;    ROBOT-ASSISTED LAPAROSCOPIC REPAIR OF INGUINAL HERNIA USING DA VERNELL XI Right 05/26/2023    Procedure: XI ROBOTIC REPAIR, HERNIA, INGUINAL, FEMORAL;  Surgeon: Katherine Segura MD;  Location: [facility] OR [address] [place] [address];  Service: General;  Laterality: Right;    ROBOT-ASSISTED REPAIR OF HIATAL HERNIA USING DA VERNELL XI N/A 03/23/2023    Procedure: XI ROBOTIC REPAIR, HERNIA, HIATAL;  Surgeon: Katherine Segura MD;  [facility];  Service: General;  Laterality: N/A;    VARICOSE VEIN SURGERY      XI ROBOTIC GASTROPEXY N/A 03/23/2023    Procedure: XI ROBOTIC GASTROPEXY;  Surgeon: Katherine Segura MD;  [facility];  Service: General;  Laterality: N/A;    XI ROBOTIC PYLOROMYOTOMY N/A 03/23/2023    Procedure: XI ROBOTIC PYLOROMYOTOMY;  Surgeon: Katherine Segura MD;  [facility];  Service: General;  Laterality: N/A;     Social History     Socioeconomic History    Marital status:       Spouse name: Lewis    Number of children: 4   Occupational History    Occupation: -retired     Employer: [employer]     Comment[place]     Employer: RETIRED   Tobacco Use    Smoking status: Never     Passive exposure: Never    Smokeless tobacco: Never   Substance and Sexual Activity    Alcohol use: Not Currently     Comment: wine occasionally    Drug use: No    Sexual activity: Yes     Partners: Male     Birth control/protection: Post-menopausal, None, See Surgical Hx     Comment: Hysterectomy   Other Topics Concern    Are you pregnant or think you may be? No    Breast-feeding No   Social History Narrative         Social Determinants of Health     Financial Resource Strain: Low Risk  (3/26/2024)    Overall Financial Resource Strain (CARDIA)     Difficulty of Paying Living Expenses: Not hard at all   Food Insecurity: No Food Insecurity (3/26/2024)    Hunger Vital Sign     Worried About Running Out of Food in the Last Year: Never true     Ran Out of Food in the Last Year: Never true   Transportation Needs: No Transportation Needs (3/26/2024)    PRAPARE - Transportation     Lack of Transportation (Medical): No     Lack of Transportation (Non-Medical): No   Physical Activity: Insufficiently Active (3/26/2024)    Exercise Vital Sign     Days of Exercise per Week: 1 day     Minutes of Exercise per Session: 10 min   Stress: No Stress Concern Present (3/26/2024)    [employer] - Occupational Stress Questionnaire     Feeling of Stress : Only a little   Housing Stability: Low Risk  (3/26/2024)    Housing Stability Vital Sign     Unable to Pay for Housing in the Last Year: No     Number of Places Lived in the Last Year: 1     Unstable Housing in the Last Year: No     Family History   Problem Relation Name Age of Onset    Breast cancer Mother Tiki Singh     Cancer Mother Tiki Singh         Breast    Hypertension Father Madi     Diabetes Father Madi          Amputation of legs    Breast cancer Sister Brenda     Diabetes Sister Brenda     Arthritis Sister Brenda     Cancer Sister Brenda         Breast    Osteoarthritis Brother Luis     Diabetes Brother Luis     Arthritis Brother Luis     Birth defects Brother Luis         Polio at birth, recently had knee replacement surgery on left knee    No Known Problems Daughter      No Known Problems Daughter      No Known Problems Son      No Known Problems Son      Breast cancer Maternal Aunt Gege     Cancer Maternal Aunt Gege         Breast    Early death Sister Philomena     Melanoma Neg Hx      Colon cancer Neg Hx      Crohn's disease Neg Hx      Stomach cancer Neg Hx      Ulcerative colitis Neg Hx      Rectal cancer Neg Hx      Irritable bowel syndrome Neg Hx      Esophageal cancer Neg Hx      Celiac disease Neg Hx      Ovarian cancer Neg Hx      Liver cancer Neg Hx      Pancreatic cancer Neg Hx         Review of patient's allergies indicates:   Allergen Reactions    Sulfa (sulfonamide antibiotics)      Other reaction(s): Rash       Current Outpatient Medications   Medication Sig    acetaminophen-codeine 300-60mg (TYLENOL #4) 300-60 mg Tab Take 1 tablet by mouth every evening.    albuterol (PROVENTIL) 2.5 mg /3 mL (0.083 %) nebulizer solution Take 3 mLs (2.5 mg total) by nebulization every 4 (four) hours as needed for Wheezing or Shortness of Breath (or cough). Rescue    albuterol (VENTOLIN HFA) 90 mcg/actuation inhaler Inhale 2 puffs into the lungs every 6 (six) hours as needed for Wheezing. Rescue    albuterol sulfate 2.5 mg/0.5 mL Nebu Take 2.5 mg by nebulization every 4 (four) hours as needed (shortness of breath/wheezing). Rescue    ergocalciferol (ERGOCALCIFEROL) 50,000 unit Cap TAKE 1 CAPSULE BY MOUTH EVERY 7 DAYS    furosemide (LASIX) 20 MG tablet Take 1 tablet (20 mg total) by mouth once daily.    gabapentin (NEURONTIN) 300 MG capsule Take 300 mg by mouth 2 (two) times daily.    multivitamin capsule Take  1 capsule by mouth once daily.    mycophenolate mofetil (CELLCEPT) 200 mg/mL SusR Take 5 mLs (1,000 mg total) by mouth 2 (two) times daily.    nabumetone (RELAFEN) 750 MG tablet Take 1 tablet (750 mg total) by mouth daily as needed for Pain.    nebulizer and compressor Rosemary Use as directed    NIFEdipine (ADALAT CC) 90 MG TbSR TAKE 1 TABLET BY MOUTH EVERY EVENING WITH 30 MG FOR A TOTAL OF 120MG    NIFEdipine (PROCARDIA-XL) 30 MG (OSM) 24 hr tablet TAKE 1 TABLET(30 MG) BY MOUTH EVERY DAY    nintedanib (OFEV) 100 mg Cap Take 100 mg by mouth 2 (two) times a day.    pravastatin (PRAVACHOL) 20 MG tablet TAKE 1 TABLET(20 MG) BY MOUTH EVERY EVENING    pregabalin (LYRICA) 300 MG Cap Take 1 capsule (300 mg total) by mouth 2 (two) times daily.    PREVIDENT 5000 BOOSTER PLUS 1.1 % Pste SMARTSIG:To Teeth    PREVIDENT 5000 SENSITIVE 1.1-5 % Pste BRUSH FOR 2 MINUTES TWICE PER DAY    RABEprazole (ACIPHEX) 20 mg tablet Take 1 tablet (20 mg total) by mouth 2 (two) times daily.    tadalafil (ADCIRCA) 20 mg Tab Take 2 tablets (40 mg total) by mouth once daily.    tiZANidine (ZANAFLEX) 4 MG tablet Take 2 tablets (8 mg total) by mouth 2 (two) times daily as needed (muscle pain).     No current facility-administered medications for this visit.     Facility-Administered Medications Ordered in Other Visits   Medication    fentaNYL 50 mcg/mL injection 25 mcg    haloperidol lactate injection 0.5 mg    sodium chloride 0.9% flush 10 mL       REVIEW OF SYSTEMS:    GENERAL:  No weight loss, malaise or fevers.  HEENT:   No recent changes in vision or hearing  NECK:  Negative for lumps,  difficulty with swallowing associated with scleroderma.  RESPIRATORY:  Negative for cough, wheezing or shortness of breath, patient denies any recent URI. Albuterol (history of ILD)  CARDIOVASCULAR:  Negative for chest pain, leg swelling or palpitations.  GI:  Negative for abdominal discomfort, blood in stools or black stools or change in bowel habits. GERD  controlled zantac  MUSCULOSKELETAL:  See HPI.  SKIN:   Chronic low healing venous stasis ulcerations to bilateral lower extremities   PSYCH:  No mood disorder or recent psychosocial stressors.  Patients sleep is not disturbed secondary to pain.  HEMATOLOGY/LYMPHOLOGY:   History of iron deficiency anemia, takes daily iron supplementation.    ENDO: No history of diabetes or thyroid dysfunction  NEURO:   No history of headaches, syncope, paralysis, seizures or tremors.  All other reviewed and negative other than HPI.    OBJECTIVE:      BP (!) 107/56 (BP Location: Right arm, Patient Position: Sitting)   Pulse (!) 56   Resp 16   Wt 67.6 kg (149 lb 0.5 oz)   BMI 24.80 kg/m²     PHYSICAL EXAMINATION:    GENERAL: Well appearing, in no acute distress, alert and oriented x3.  PSYCH:  Mood and affect appropriate.  SKIN: Tight skin associated with scleroderma. Wearing shoes today.   HEAD/FACE:  Normocephalic, atraumatic. Cranial nerves grossly intact.  CV: RRR with palpation of the radial artery.  PULM: No evidence of respiratory difficulty, symmetric chest rise.  EXTREMITIES: Contractures over on bilateral hands with ulcerations to knuckles.   MUSCULOSKELETAL:   Bilateral lower extremity strength testing limited secondary to pain in the feet.    NEURO:  Decreased sensation to BLE.   GAIT: Antalgic, ambulates without assistance       ASSESSMENT: 73 y.o. year old female with pain, consistent with the following:    Encounter Diagnoses   Name Primary?    Chronic pain disorder Yes    Myofascial pain     Cutaneous scleroderma     Idiopathic neuropathy     Encounter for monitoring opioid maintenance therapy          PLAN:     - Previous records and imaging reviewed.  Labs reviewed.     - Continue Lyrica 300 mg BID.     - Continue Relafen. Refill provided.     - Continue Zanaflex 8 mg at bedtime. Refill provided.     - Pain Contract signed 8/26/2022.     - Continue Tylenol #3 once daily PRN. Refill provided.    - The patient is  here today for a refill of current pain medications and they believe these provide effective pain control and improvements in quality of life.  The patient notes no serious side effects, and feels the benefits outweigh the risks.  The patient was reminded of the pain contract that they signed previously as well as the risks and benefits of the medication including possible death.  The updated [place] [employer] prescription monitoring program was reviewed, and the patient has been found to be compliant with current treatment plan. Medication management provided by Dr. Hinson.     - UDS on 1/26/2024 reviewed and consistent. Repeat UDS today.     - Continue home exercise routine.     - RTC in 3 months or sooner if needed.    The above plan and management options were discussed at length with patient. Patient is in agreement with the above and verbalized understanding.     Viktoriya Camara NP  07/30/2024

## 2024-08-19 ENCOUNTER — PATIENT MESSAGE (OUTPATIENT)
Dept: ADMINISTRATIVE | Facility: HOSPITAL | Age: 74
End: 2024-08-19
Payer: MEDICARE

## 2024-08-19 RX ORDER — ACETAMINOPHEN AND CODEINE PHOSPHATE 300; 60 MG/1; MG/1
1 TABLET ORAL NIGHTLY
Qty: 30 TABLET | Refills: 0 | Status: SHIPPED | OUTPATIENT
Start: 2024-08-19 | End: 2024-09-18

## 2024-08-19 NOTE — TELEPHONE ENCOUNTER
Patient requesting refill on acetaminophen-codeine 300-30mg (TYLENOL #3) 300-30 mg   Last office visit 07/30/24   shows last refill on 07/17/24  Patient does have a pain contract on file with [facility]  Patient last UDS 07/30/24 was consistent with current therapy                  CODEINE  Present Present CM   Not Detected     Comment: INTERPRETIVE INFORMATION: Codeine, U  Positive Cutoff: 40 ng/mL  Methodology: Mass Spectrometry   MORPHINE  Present Present CM   Not Detected     Comment: INTERPRETIVE INFORMATION:Morphine, U  Positive Cutoff: 20 ng/mL  Methodology: Mass Spectrometry   6-ACETYLMORPHINE  Not Detected Not Detected CM   Not Detected     Comment: INTERPRETIVE INFORMATION:6-acetylmorphine, U  Positive Cutoff: 20 ng/mL  Methodology: Mass Spectrometry   OXYCODONE  Not Detected Not Detected CM   Not Detected     Comment: INTERPRETIVE INFORMATION:Oxycodone, U  Positive Cutoff: 40 ng/mL  Methodology: Mass Spectrometry   NOROYXCODONE  Not Detected Not Detected CM   Not Detected     Comment: INTERPRETIVE INFORMATION:Noroxycodone, U  Positive Cutoff: 100 ng/mL  Methodology: Mass Spectrometry   OXYMORPHONE  Not Detected Not Detected CM   Not Detected     Comment: INTERPRETIVE INFORMATION:Oxymorphone, U  Positive Cutoff: 40 ng/mL  Methodology: Mass Spectrometry   NOROXYMORPHONE  Not Detected Not Detected CM   Not Detected     Comment: INTERPRETIVE INFORMATION:Noroxymorphone, U  Positive Cutoff: 100 ng/mL  Methodology: Mass Spectrometry   HYDROCODONE  Present Not Detected CM   Not Detected     Comment: INTERPRETIVE INFORMATION:Hydrocodone, U  Positive Cutoff: 40 ng/mL  Methodology: Mass Spectrometry   NORHYDROCODONE  Not Detected Not Detected CM   Not Detected     Comment: INTERPRETIVE INFORMATION:Norhydrocodone, U  Positive Cutoff: 100 ng/mL  Methodology: Mass Spectrometry   HYDROMORPHONE  Not Detected Not Detected CM   Not Detected     Comment: INTERPRETIVE  INFORMATION:Hydromorphone, U  Positive Cutoff: 20 ng/mL  Methodology: Mass Spectrometry   BUPRENORPHINE  Not Detected Not Detected CM   Not Detected     Comment: INTERPRETIVE INFORMATION:Buprenorphine, U  Positive Cutoff: 5 ng/mL  Methodology: Mass Spectrometry   NORUBPRENORPHINE  Not Detected Not Detected CM   Not Detected     Comment: INTERPRETIVE INFORMATION:Norbuprenorphine, U  Positive Cutoff: 20 ng/mL  Methodology: Mass Spectrometry   FENTANYL  Not Detected Not Detected CM   Not Detected     Comment: INTERPRETIVE INFORMATION:Fentanyl, U  Positive Cutoff: 2 ng/mL  Methodology: Mass Spectrometry   NORFENTANYL  Not Detected Not Detected CM   Not Detected     Comment: INTERPRETIVE INFORMATION:Norfentanyl, U  Positive Cutoff: 2 ng/mL  Methodology: Mass Spectrometry   MEPERIDINE METABOLITE  Not Detected Not Detected CM   Not Detected     Comment: INTERPRETIVE INFORMATION:Meperidine metabolite, U  Positive Cutoff: 50 ng/mL  Methodology: Mass Spectrometry   TAPENTADOL  Not Detected Not Detected CM   Not Detected     Comment: INTERPRETIVE INFORMATION:Tapentadol, U  Positive Cutoff: 100 ng/mL  Methodology: Mass Spectrometry   TAPENTADOL-O-SULF  Not Detected Not Detected CM   Not Detected     Comment: INTERPRETIVE INFORMATION:Tapentadol-o-Sulf, U  Positive Cutoff: 200 ng/mL  Methodology: Mass Spectrometry   METHADONE  Negative Negative CM   Not Detected     Comment: Presumptive negative by immunoassay. Testing by mass  spectrometry is available on request.  INTERPRETIVE INFORMATION: Methadone Screen, U  Positive Cutoff: 150 ng/mL  Methodology: Immunoassay   TRAMADOL  Negative Negative CM   Not Detected     Comment: Presumptive negative by immunoassay. Testing by mass  spectrometry is available on request.  INTERPRETIVE INFORMATION:Tramadol Screen, U  Positive Cutoff: 100 ng/mL  Methodology: Immunoassay   AMPHETAMINE  Not Detected Not Detected CM   Not Detected     Comment: INTERPRETIVE INFORMATION:Amphetamine,  U  Positive Cutoff: 50 ng/mL  Methodology: Mass Spectrometry   METHAMPHETAMINE  Not Detected Not Detected CM   Not Detected     Comment: INTERPRETIVE INFORMATION:Methamphetamine, U  Positive Cutoff: 200 ng/mL  Methodology: Mass Spectrometry   MDMA- ECSTASY  Not Detected Not Detected CM   Not Detected     Comment: INTERPRETIVE INFORMATION:MDMA, U  Positive Cutoff: 200 ng/mL  Methodology: Mass Spectrometry   MDA  Not Detected Not Detected CM   Not Detected     Comment: INTERPRETIVE INFORMATION:MDA, U  Positive Cutoff: 200 ng/mL  Methodology: Mass Spectrometry   MDEA- Mickie  Not Detected Not Detected CM   Not Detected     Comment: INTERPRETIVE INFORMATION:MDEA, U  Positive Cutoff: 200 ng/mL  Methodology: Mass Spectrometry   METHYLPHENIDATE  Not Detected Not Detected CM   Not Detected     Comment: INTERPRETIVE INFORMATION:Methylphenidate, U  Positive Cutoff: 100 ng/mL  Methodology: Mass Spectrometry   PHENTERMINE  Not Detected Not Detected CM   Not Detected     Comment: INTERPRETIVE INFORMATION:Phentermine, U  Positive Cutoff: 100 ng/mL  Methodology: Mass Spectrometry   BENZOYLECGONINE  Negative Negative CM   Not Detected     Comment: Presumptive negative by immunoassay. Testing by mass  spectrometry is available on request.  INTERPRETIVE INFORMATION:Cocaine Screen, U  Positive Cutoff: 150 ng/mL  Methodology: Immunoassay   ALPRAZOLAM  Not Detected Not Detected CM   Not Detected     Comment: INTERPRETIVE INFORMATION:Alprazolam, U  Positive Cutoff: 40 ng/mL  Methodology: Mass Spectrometry   ALPHA-OH-ALPRAZOLAM  Not Detected Not Detected CM   Not Detected     Comment: INTERPRETIVE INFORMATION:Alpha-OH-Alprazolam, U  Positive Cutoff: 20 ng/mL  Methodology: Mass Spectrometry   CLONAZEPAM  Not Detected Not Detected CM   Not Detected     Comment: INTERPRETIVE INFORMATION:Clonazepam, U  Positive Cutoff: 20 ng/mL  Methodology: Mass Spectrometry   7-AMINOCLONAZEPAM  Not Detected Not Detected CM   Not Detected     Comment:  INTERPRETIVE INFORMATION:7-Aminoclonazepam, U  Positive Cutoff: 40 ng/mL  Methodology: Mass Spectrometry   DIAZEPAM  Not Detected Not Detected CM   Not Detected     Comment: INTERPRETIVE INFORMATION:Diazepam, U  Positive Cutoff: 50 ng/mL  Methodology: Mass Spectrometry   NORDIAZEPAM  Not Detected Not Detected CM   Not Detected     Comment: INTERPRETIVE INFORMATION:Nordiazepam, U  Positive Cutoff: 50 ng/mL  Methodology: Mass Spectrometry   OXAZEPAM  Not Detected Not Detected CM   Not Detected     Comment: INTERPRETIVE INFORMATION:Oxazepam, U  Positive Cutoff: 50 ng/mL  Methodology: Mass Spectrometry   TEMAZEPAM  Not Detected Not Detected CM   Not Detected     Comment: INTERPRETIVE INFORMATION:Temazepam, U  Positive Cutoff: 50 ng/mL  Methodology: Mass Spectrometry   Lorazepam  Not Detected Not Detected CM   Not Detected     Comment: INTERPRETIVE INFORMATION:Lorazepam, U  Positive Cutoff: 60 ng/mL  Methodology: Mass Spectrometry   MIDAZOLAM  Not Detected Not Detected CM   Not Detected     Comment: INTERPRETIVE INFORMATION:Midazolam, U  Positive Cutoff: 20 ng/mL  Methodology: Mass Spectrometry   ZOLPIDEM  Not Detected Not Detected CM   Not Detected     Comment: INTERPRETIVE INFORMATION:Zolpidem, U  Positive Cutoff: 20 ng/mL  Methodology: Mass Spectrometry   BARBITURATES  Negative Negative CM   Not Detected     Comment: Presumptive negative by immunoassay. Testing by mass  spectrometry is available on request.  INTERPRETIVE INFORMATION:Barbiturates Screen, U  Positive Cutoff: 200 ng/mL  Methodology: Immunoassay   Creatinine, Urine 20.0 - 400.0 mg/dL 145.0 112.0 47.0 R 103.0 R 344.5 139.0 R,  R   ETHYL GLUCURONIDE  Negative Negative CM   Present     Comment: Presumptive negative by immunoassay. Testing by mass  spectrometry is available on request.  INTERPRETIVE INFORMATION:Ethyl Glucuronide Screen, U  Positive Cutoff: 500 ng/mL  Methodology: Immunoassay   MARIJUANA METABOLITE  Negative Negative CM   Not Detected      Comment: Presumptive negative by immunoassay. Testing by mass  spectrometry is available on request.  INTERPRETIVE INFORMATION: THC (Cannabinoids) Screen, U  Positive Cutoff: 50 ng/mL  Methodology: Immunoassay   PCP  Negative Negative CM   Not Detected     Comment: Presumptive negative by immunoassay. Testing by mass  spectrometry is available on request.  INTERPRETIVE INFORMATION:Phencyclidine Screen, U  Positive Cutoff: 25 ng/mL  Methodology: Immunoassay   CARISOPRODOL  Negative Negative CM   Not Detected CM     Comment: Presumptive negative by immunoassay. Testing by mass  spectrometry is available on request.  INTERPRETIVE INFORMATION: Carisoprodol Screen, U  Positive Cutoff: 100 ng/mL  Methodology: Immunoassay  The carisoprodol immunoassay has cross-reactivity to  carisoprodol and meprobamate.   Naloxone  Not Detected Not Detected CM   Not Detected     Comment: INTERPRETIVE INFORMATION:Naloxone, U  Positive Cutoff: 100 ng/mL  Methodology: Mass Spectrometry   Gabapentin  Not Detected Not Detected CM   Not Detected     Comment: INTERPRETIVE INFORMATION:Gabapentin, U  Positive Cutoff: 3,000 ng/mL  Methodology: Mass Spectrometry   Pregabalin  Present Present CM   Present     Comment: INTERPRETIVE INFORMATION:Pregabalin, U  Positive Cutoff: 3,000 ng/mL  Methodology: Mass Spectrometry   Alpha-OH-Midazolam  Not Detected Not Detected CM   Not Detected     Comment: INTERPRETIVE INFORMATION:Alpha-OH-Midazolam, U  Positive Cutoff: 20 ng/mL  Methodology: Mass Spectrometry   Zolpidem Metabolite  Not Detected Not Detected CM   Not Detected     Comment: INTERPRETIVE INFORMATION:Zolpidem Metabolite, U  Positive Cutoff: 100 ng/mL  Methodology: Mass Spectrometry   PAIN MANAGEMENT DRUG PANEL  See Below See Below CM   See Below CM

## 2024-08-22 ENCOUNTER — HOSPITAL ENCOUNTER (OUTPATIENT)
Dept: PULMONOLOGY | Facility: CLINIC | Age: 74
Discharge: HOME | End: 2024-08-22
Payer: MEDICARE

## 2024-08-22 ENCOUNTER — OFFICE VISIT (OUTPATIENT)
Dept: TRANSPLANT | Facility: CLINIC | Age: 74
End: 2024-08-22
Payer: MEDICARE

## 2024-08-22 ENCOUNTER — PATIENT MESSAGE (OUTPATIENT)
Dept: TRANSPLANT | Facility: CLINIC | Age: 74
End: 2024-08-22

## 2024-08-22 VITALS
WEIGHT: 146 LBS | TEMPERATURE: 98 F | DIASTOLIC BLOOD PRESSURE: 61 MMHG | HEART RATE: 70 BPM | RESPIRATION RATE: 18 BRPM | SYSTOLIC BLOOD PRESSURE: 135 MMHG | BODY MASS INDEX: 24.32 KG/M2 | OXYGEN SATURATION: 97 % | HEIGHT: 65 IN

## 2024-08-22 VITALS
HEIGHT: 65 IN | SYSTOLIC BLOOD PRESSURE: 135 MMHG | DIASTOLIC BLOOD PRESSURE: 57 MMHG | HEART RATE: 67 BPM | WEIGHT: 146.38 LBS | BODY MASS INDEX: 24.39 KG/M2

## 2024-08-22 VITALS — BODY MASS INDEX: 24.43 KG/M2 | WEIGHT: 146.63 LBS | HEIGHT: 65 IN

## 2024-08-22 DIAGNOSIS — I27.9 CHRONIC PULMONARY HEART DISEASE: ICD-10-CM

## 2024-08-22 DIAGNOSIS — I27.21 WHO GROUP 1 PULMONARY ARTERIAL HYPERTENSION: ICD-10-CM

## 2024-08-22 DIAGNOSIS — I27.21 WHO GROUP 1 PULMONARY ARTERIAL HYPERTENSION: Primary | ICD-10-CM

## 2024-08-22 DIAGNOSIS — M34.9 SCLERODERMA WITH PULMONARY INVOLVEMENT: Primary | ICD-10-CM

## 2024-08-22 DIAGNOSIS — M34.9 SCLERODERMA WITH PULMONARY INVOLVEMENT: ICD-10-CM

## 2024-08-22 DIAGNOSIS — K21.9 GASTROESOPHAGEAL REFLUX DISEASE, UNSPECIFIED WHETHER ESOPHAGITIS PRESENT: ICD-10-CM

## 2024-08-22 DIAGNOSIS — Z79.899 HIGH RISK MEDICATION USE: ICD-10-CM

## 2024-08-22 DIAGNOSIS — J84.9 ILD (INTERSTITIAL LUNG DISEASE): ICD-10-CM

## 2024-08-22 DIAGNOSIS — L98.499 ARTERIAL INSUFFICIENCY WITH ISCHEMIC ULCER: ICD-10-CM

## 2024-08-22 DIAGNOSIS — I77.1 ARTERIAL INSUFFICIENCY WITH ISCHEMIC ULCER: ICD-10-CM

## 2024-08-22 DIAGNOSIS — I70.0 CALCIFICATION OF AORTA: ICD-10-CM

## 2024-08-22 DIAGNOSIS — I73.00 RAYNAUD'S DISEASE WITHOUT GANGRENE: ICD-10-CM

## 2024-08-22 LAB
DLCO ADJ PRE: 10.87 ML/(MIN*MMHG) (ref 15.88–27.34)
DLCO SINGLE BREATH LLN: 15.88
DLCO SINGLE BREATH PRE REF: 49.5 %
DLCO SINGLE BREATH REF: 21.61
DLCOC SBVA LLN: 2.84
DLCOC SBVA PRE REF: 96.8 %
DLCOC SBVA REF: 4.23
DLCOC SINGLE BREATH LLN: 15.88
DLCOC SINGLE BREATH PRE REF: 50.3 %
DLCOC SINGLE BREATH REF: 21.61
DLCOCSBVAULN: 5.62
DLCOCSINGLEBREATHULN: 27.34
DLCOCSINGLEBREATHZSCORE: -3.08
DLCOSINGLEBREATHULN: 27.34
DLCOSINGLEBREATHZSCORE: -3.13
DLCOVA LLN: 2.84
DLCOVA PRE REF: 95.3 %
DLCOVA PRE: 4.03 ML/(MIN*MMHG*L) (ref 2.84–5.62)
DLCOVA REF: 4.23
DLCOVAULN: 5.62
DLVAADJ PRE: 4.1 ML/(MIN*MMHG*L) (ref 2.84–5.62)
ERVN2 LLN: -16449.37
ERVN2 PRE REF: 89.1 %
ERVN2 PRE: 0.56 L (ref -16449.37–16450.63)
ERVN2 REF: 0.63
ERVN2ULN: ABNORMAL
FEF 25 75 LLN: 1.14
FEF 25 75 PRE REF: 58.4 %
FEF 25 75 REF: 2.54
FEV05 LLN: 0.87
FEV05 REF: 1.72
FEV1 FVC LLN: 64
FEV1 FVC PRE REF: 105.9 %
FEV1 FVC REF: 78
FEV1 LLN: 1.59
FEV1 PRE REF: 66.2 %
FEV1 REF: 2.2
FEV1FVCZSCORE: 0.62
FEV1ZSCORE: -1.98
FRCN2 LLN: 1.95
FRCN2 PRE REF: 62.9 %
FRCN2 REF: 2.77
FRCN2ULN: 3.59
FVC LLN: 2.07
FVC PRE REF: 61.9 %
FVC REF: 2.86
FVCZSCORE: -2.28
ICN2REF: 2.06
IVC PRE: 1.73 L (ref 1.71–3.23)
IVC SINGLE BREATH LLN: 2.07
IVC SINGLE BREATH PRE REF: 60.6 %
IVC SINGLE BREATH REF: 2.86
IVCSINGLEBREATHULN: 3.7
LLN IC N2: -16447.94
PEF LLN: 3.88
PEF PRE REF: 94.9 %
PEF REF: 5.65
PHYSICIAN COMMENT: ABNORMAL
PRE DLCO: 10.7 ML/(MIN*MMHG) (ref 15.88–27.34)
PRE FEF 25 75: 1.48 L/S (ref 1.14–3.93)
PRE FET 100: 6.1 SEC
PRE FEV05 REF: 66.9 %
PRE FEV1 FVC: 82.28 % (ref 64.85–89.57)
PRE FEV1: 1.46 L (ref 1.31–2.46)
PRE FEV5: 1.15 L (ref 0.87–2.58)
PRE FRC N2: 1.74 L (ref 1.95–3.59)
PRE FVC: 1.77 L (ref 1.71–3.23)
PRE IC N2: 1.25 L (ref -16447.94–16452.06)
PRE PEF: 5.36 L/S (ref 2.77–6.91)
PRE REF IC N2: 60.7 %
RVN2 LLN: 1.56
RVN2 PRE REF: 55.2 %
RVN2 PRE: 1.18 L (ref 1.56–2.72)
RVN2 REF: 2.14
RVN2TLCN2 LLN: 33.85
RVN2TLCN2 PRE REF: 90.9 %
RVN2TLCN2 PRE: 39.51 % (ref 33.85–53.03)
RVN2TLCN2 REF: 43.44
RVN2TLCN2ULN: 53.03
RVN2ULN: 2.72
TLCN2 LLN: 4.12
TLCN2 PRE REF: 58.6 %
TLCN2 PRE: 2.99 L (ref 4.12–6.09)
TLCN2 REF: 5.11
TLCN2ULN: 6.09
TLCN2ZSCORE: -3.53
ULN IC N2: ABNORMAL
VA PRE: 2.65 L (ref 4.96–4.96)
VA SINGLE BREATH LLN: 4.96
VA SINGLE BREATH PRE REF: 53.5 %
VA SINGLE BREATH REF: 4.96
VASINGLEBREATHULN: 4.96
VCMAXN2 LLN: 2.07
VCMAXN2 PRE REF: 63.2 %
VCMAXN2 PRE: 1.81 L (ref 1.71–3.23)
VCMAXN2 REF: 2.86
VCMAXN2ULN: 3.7

## 2024-08-22 PROCEDURE — 99999 PR PBB SHADOW E&M-EST. PATIENT-LVL IV: CPT | Mod: PBBFAC,TXP,, | Performed by: PHYSICIAN ASSISTANT

## 2024-08-22 PROCEDURE — 99999 PR PBB SHADOW E&M-EST. PATIENT-LVL V: CPT | Mod: PBBFAC,,,

## 2024-08-22 PROCEDURE — 99215 OFFICE O/P EST HI 40 MIN: CPT | Mod: PBBFAC,25,27

## 2024-08-22 PROCEDURE — 99214 OFFICE O/P EST MOD 30 MIN: CPT | Mod: S$PBB,,,

## 2024-08-22 PROCEDURE — 99214 OFFICE O/P EST MOD 30 MIN: CPT | Mod: PBBFAC,TXP | Performed by: PHYSICIAN ASSISTANT

## 2024-08-22 NOTE — PROGRESS NOTES
Subjective:    Patient ID:  Yamel Shah is a 73 y.o. female who presents for follow-up of Pulmonary Hypertension.    HPI   Mrs. Shah was diagnosed with scleroderma in 1983, followed by Dr. Lim (and previously Dr. Boudreaux). She was on revatio in 2013, then switched to adcirca 40mg po daily, which she has remained on every since. Last clinic visit was February 2023. RHC in March, 2023 shows normal PA pressures, normal filling pressures, normal CO/CI. She is followed closely by Rheum and ALD/lung transplant.    Mrs. Shah is doing well. Feels about baseline. Feels better after iron infusion. Walk was a little shorter today. Maintained O2 sat. Independent of ADLs/IADLs. Patient denies chest pain, chest pressure, syncope, pre-syncope, lightheadedness, dizziness, PND, orthopnea, abdominal pain, abdominal pressure, or N/V/F/C.    6MWT:   4/29/2024 8/22/2024   6MW     6MWT Status completed without stopping  completed without stopping    Patient Reported Dyspnea;Leg pain  Leg pain    Was O2 used? No  No    6MW Distance walked (feet) 1532 feet  1275 feet    Distance walked (meters) 466.95 meters  388.62 meters    Did patient stop? No  No    Oxygen Saturation 99 %  97 %    Supplemental Oxygen Room Air  Room Air    Heart Rate 77 bpm  68 bpm    Blood Pressure 133/66  125/63    Ju Dyspnea Rating  moderate  somewhat heavy    Oxygen Saturation 97 %  97 %    Supplemental Oxygen Room Air  Room Air    Heart Rate 98 bpm  86 bpm    Blood Pressure 167/63  133/56    Unable to obtain     Ju Dyspnea Rating  somewhat heavy  moderate    Recovery Time (seconds) 157 seconds  65 seconds    Oxygen Saturation 100 %  99 %    Supplemental Oxygen  Room Air    Heart Rate 88 bpm  78 bpm          ECHO: 3/15/2024    Left Ventricle: The left ventricle is normal in size. Normal wall thickness. Normal wall motion. There is normal systolic function with a visually estimated ejection fraction of 55 - 60%. There is normal  diastolic function.    Right Ventricle: Normal right ventricular cavity size. Wall thickness is normal. Right ventricle wall motion  is normal. Systolic function is normal.    The left atrium is moderately dilated.    Tricuspid Valve: There is mild regurgitation.    Pulmonary Artery: The estimated pulmonary artery systolic pressure is 43 mmHg.    IVC/SVC: Intermediate venous pressure at 8 mmHg.    ECHO: 8/17/2023  Left Ventricle: The left ventricle is normal in size. Normal wall thickness. Normal wall motion. There is normal systolic function with a visually estimated ejection fraction of 60 - 65%. There is normal diastolic function.    Right Ventricle: Normal right ventricular cavity size. Wall thickness is normal. Right ventricle wall motion  is normal. Systolic function is normal.    Pulmonary Artery: The estimated pulmonary artery systolic pressure is 40 mmHg.    RHC: 3/16/2023  RA: 7/ 5/ 5 RV: 35/ 1/ 6 PA: 35/ 10/ 18 PWP: 16/ 16/ 10 .   Cardiac output was 6.9. Cardiac index is 3.9 L/min/m2.   O2 Sat: PA 74%.   Pulmonary vascular resistance: 1.1 JOLLEY.     SaO2:  95%  BSA 1.8 m2  HGB 12 g/dl.    PFTs:    9/18/2023 3/4/2024   Pulmonary Function Tests     FVC 1.76 liters  1.81 liters    FEV1 1.46 liters  1.43 liters    TLC (liters) 2.7 liters  3.28 liters    DLCO (ml/mmHg sec) 9.15 ml/mmHg sec  10.62 ml/mmHg sec    FVC% 66.1  63    FEV1% 70.5  64    FEF 25-75 1.64  1.32    FEF 25-75% 93.5  72    TLC% 52.8  64    RV 0.94  1.33    RV% 44.2  62    DLCO% 42.1  49        Review of Systems   Constitutional: Negative.   HENT: Negative.     Eyes: Negative.    Cardiovascular:  Positive for dyspnea on exertion and leg swelling. Negative for chest pain, near-syncope and syncope.   Respiratory:  Positive for cough.    Endocrine: Negative.    Hematologic/Lymphatic: Negative.    Skin: Negative.    Musculoskeletal: Negative.    Gastrointestinal: Negative.    Neurological: Negative.    Psychiatric/Behavioral: Negative.         "  Objective: BP (!) 135/57 (BP Location: Left arm, Patient Position: Sitting, BP Method: Medium (Automatic))   Pulse 67   Ht 5' 5" (1.651 m)   Wt 66.4 kg (146 lb 6.2 oz)   BMI 24.36 kg/m²       Physical Exam  Constitutional:       General: She is not in acute distress.     Appearance: Normal appearance. She is not ill-appearing.   HENT:      Head: Normocephalic.   Cardiovascular:      Rate and Rhythm: Normal rate and regular rhythm.      Pulses: Normal pulses.      Heart sounds: Normal heart sounds.   Pulmonary:      Effort: Pulmonary effort is normal.   Abdominal:      Palpations: Abdomen is soft.   Musculoskeletal:         General: Normal range of motion.      Cervical back: Normal range of motion.      Right lower leg: Edema present.      Left lower leg: Edema present.   Skin:     General: Skin is dry.      Capillary Refill: Capillary refill takes less than 2 seconds.      Comments: Tight skin, sclerotic changes   Neurological:      Mental Status: She is oriented to person, place, and time.   Psychiatric:         Mood and Affect: Mood normal.         Behavior: Behavior normal.           Lab Results   Component Value Date    BNP 67 08/22/2024     08/22/2024    K 3.8 08/22/2024    MG 1.9 08/22/2024     08/22/2024    CO2 24 08/22/2024    BUN 17 08/22/2024    CREATININE 0.7 08/22/2024    GLU 83 08/22/2024    HGBA1C 5.2 10/19/2023    AST 18 08/22/2024    ALT 11 08/22/2024    ALBUMIN 3.4 (L) 08/22/2024    PROT 8.4 08/22/2024    BILITOT 0.5 08/22/2024    CHOL 129 10/19/2023    HDL 47 10/19/2023    LDLCALC 71.6 10/19/2023    TRIG 52 10/19/2023       Magnesium   Date Value Ref Range Status   08/22/2024 1.9 1.6 - 2.6 mg/dL Final       Lab Results   Component Value Date    WBC 6.63 08/22/2024    HGB 12.6 08/22/2024    HCT 37.1 08/22/2024    MCV 97 08/22/2024     08/22/2024       BNP   Date Value Ref Range Status   08/22/2024 67 0 - 99 pg/mL Final     Comment:     Values of less than 100 pg/ml are " consistent with non-CHF populations.   04/29/2024 80 0 - 99 pg/mL Final     Comment:     Values of less than 100 pg/ml are consistent with non-CHF populations.   03/15/2024 81 0 - 99 pg/mL Final     Comment:     Values of less than 100 pg/ml are consistent with non-CHF populations.       WHO Group: 1  Functional Class: II  REVEAL Score: 4 (Low Risk)  Assessment:       1. WHO group 1 pulmonary arterial hypertension    2. Chronic pulmonary heart disease    3. Scleroderma with pulmonary involvement    4. Raynaud's disease without gangrene    5. ILD (interstitial lung disease)    6. Arterial insufficiency with ischemic ulcer    7. Calcification of aorta        Plan:   Mrs. Shah reports baseline symptoms. Euvolemic on exam. REVEAL score low. ECHO with normal RV, but needs RHC to properly evaluate PA pressures in the setting of known scleroderma.        No medication changes today.    Will plan for a right heart cath with labs and appointment afterwards in December.    Recommend 2 gram sodium restriction and 1500cc fluid restriction.  Encourage physical activity with graded exercise program.  Requested patient to weigh themselves daily, and to notify us if their weight increases by more than 3 lbs in 1 day or 5 lbs in 1 week.      Claire Pelaez DNP, JACINAT  [facility]

## 2024-08-22 NOTE — PATIENT INSTRUCTIONS
No medication changes today.    Will plan for a right heart cath with labs and appointment afterwards in December.    Recommend 2 gram sodium restriction and 1500cc fluid restriction.  Encourage physical activity with graded exercise program.  Requested patient to weigh themselves daily, and to notify us if their weight increases by more than 3 lbs in 1 day or 5 lbs in 1 week.

## 2024-08-22 NOTE — LETTER
August 22, 2024        Luis Ahuja  [address]  [facility] [address]  Phone: [phone]  Fax: [phone]             [facility] 1st Fl  [address]  [facility] [address]  Phone: [phone]   Patient: Yamel Shah   MR Number: [id number]   YOB: 1950   Date of Visit: 8/22/2024       Dear Dr. Luis Ahuja    Thank you for referring Yamel Shah to me for evaluation. Attached you will find relevant portions of my assessment and plan of care.    If you have questions, please do not hesitate to call me. I look forward to following Yamel Shah along with you.    Sincerely,    FABRIZIO Ramososure    If you would like to receive this communication electronically, please contact [email or web address] or [phone] to request [employer] access.    [employer] is a tool which provides read-only access to select patient information with whom you have a relationship. Its easy to use and provides real time access to review your patients record including encounter summaries, notes, results, and demographic information.    If you feel you have received this communication in error or would no longer like to receive these types of communications, please e-mail [email or web address]

## 2024-08-22 NOTE — PROGRESS NOTES
ADVANCED LUNG DISEASE CLINIC FOLLOW-UP                                                                                                                                             Reason for Visit:  SSc-ILD    Referring Physician: Roberta Leigh, *    History of Present Illness: Yamel Shah is a 73 y.o. female who is on 0L of oxygen.  She is on no assisted ventilation.  Her New York Heart Association Class is II and a Karnofsky score of 90% - Able to carry on normal activity: minor symptoms of disease. She is not diabetic.    She presents today for routine follow-up. Overall doing well from a respiratory standpoint. No exacerbations since her last clinic visit. Follows with PH clinic and has some increased lower extremity edema. On lasix She is on MMF and OFEV tolerating well.  States her reflux is well controlled most of the time. Has some increased dyspnea while walking stairs, but stable overall.     Past Medical History:   Diagnosis Date    Abnormal Pap smear     Acid reflux     Allergy     Arthritis     Encounter for blood transfusion     GERD (gastroesophageal reflux disease)     Hiatal hernia 03/24/2023    History of migraine headaches     Hx of colonic polyp     Hypertension     Idiopathic neuropathy 07/20/2012    ILD (interstitial lung disease) 11/06/2013    Iron deficiency anemia 03/18/2014    MRSA carrier     Osteopenia     Pneumonia     Pulmonary fibrosis     Pulmonary hypertension     Raynaud's disease     Scleroderma, diffuse     Sjogren's syndrome     Vitamin D deficiency 11/14/2013       Past Surgical History:   Procedure Laterality Date    24 HOUR IMPEDANCE PH MONITORING OF ESOPHAGUS IN PATIENT NOT TAKING ACID REDUCING MEDICATIONS N/A 03/04/2020    Procedure: IMPEDANCE PH STUDY, ESOPHAGEAL, 24 HOUR, IN PATIENT NOT TAKING ACID REDUCING MEDICATION;  Surgeon: Annamaria Mendoza MD;  Location: [facility] ([address] [place] [address]);  Service: Endoscopy;  Laterality: N/A;  OFF PPI/H2 Blocker   Motility  Studies   Hold Narcotics x 1 days   Hold TCA x 1 days  2/26 - LVM attempting to confirm appt  2/27 - Confirmed appt    ANORECTAL MANOMETRY N/A 05/10/2021    Procedure: MANOMETRY, ANORECTAL with balloon expulsion test;  Surgeon: Annamaria Mendoza MD;  Location: [facility] (4TH FLR);  Service: Endoscopy;  Laterality: N/A;  order combined  covid test 5/7 Yarsani, instructions emailed-[facility]    BREAST BIOPSY      Left, benign    BRONCHOSCOPY N/A 10/2/2023    Procedure: bronch;  Surgeon: Roberta Leigh DO;  Location: [facility] OR 2ND FLR;  Service: Endoscopy;  Laterality: N/A;    CERVICAL CONIZATION   W/ LASER  1970    COLONOSCOPY      COLONOSCOPY N/A 03/29/2019    Procedure: COLONOSCOPY;  Surgeon: Annamaria Mendoza MD;  Location: [facility] (2ND FLR);  Service: Endoscopy;  Laterality: N/A;    COLONOSCOPY N/A 05/10/2021    Procedure: COLONOSCOPY;  Surgeon: Annamaria Mendoza MD;  Location: [facility] (4TH FLR);  Service: Endoscopy;  Laterality: N/A;  2nd floor-previous scopes done on 2nd floor, gastroparesis  full liquid diet x2 days, clear liquid x1 day prior to procedure  covid test 5/7 Yarsani, instructions emailed-[facility]  5/6 pt confirmed appt-[facility]    DILATION AND CURETTAGE OF UTERUS      ESOPHAGEAL MANOMETRY WITH MEASUREMENT OF IMPEDANCE N/A 03/04/2020    Procedure: MANOMETRY, ESOPHAGUS, WITH IMPEDANCE MEASUREMENT;  Surgeon: Annamaria Mendoza MD;  Location: [facility] (4TH FLR);  Service: Endoscopy;  Laterality: N/A;  OFF PPI/H2 Blocker   Motility Studies   Hold Narcotics x 1 days   Hold TCA x 1 days    ESOPHAGOGASTRODUODENOSCOPY      ESOPHAGOGASTRODUODENOSCOPY N/A 03/29/2019    Procedure: EGD (ESOPHAGOGASTRODUODENOSCOPY);  Surgeon: Annamaria Mendoza MD;  Location: [facility] (2ND FLR);  Service: Endoscopy;  Laterality: N/A;  pulmonary htn    ESOPHAGOGASTRODUODENOSCOPY N/A 02/02/2021    Procedure: ESOPHAGOGASTRODUODENOSCOPY (EGD);  Surgeon: Annamaria Mendoza MD;  Location: [facility] (2ND FLR);  Service: Endoscopy;  Laterality:  N/A;  2nd floor gastroparesis  3 days full liquid diet and 1 day clears  covid test 1/30 primary care, instructions sent to [facility]    ESOPHAGOGASTRODUODENOSCOPY N/A 07/01/2022    Procedure: ESOPHAGOGASTRODUODENOSCOPY (EGD);  Surgeon: Annamaria Mendoza MD;  Location: [address]);  Service: Endoscopy;  Laterality: N/A;  2nd floor-gastroparesis  full liquid diet x3 days, clear liquid diet x1 day prior to procedure  fully vaccinated, instructions sent to [facility]  6/29-pt confirmed new arrival time-[facility]    EXCISION, LESION, STOMACH, LAPAROSCOPIC N/A 03/23/2023    Procedure: XI ROBOTIC EXCISION, LESION, STOMACH;  Surgeon: Katherine Segura MD;  Location: [address];  Service: General;  Laterality: N/A;    EXCISIONAL BIOPSY, LYMPH NODE Right 05/26/2023    Procedure: EXCISIONAL BIOPSY, LYMPH NODE, INGUINAL;  Surgeon: Katherine Segura MD;  Location: [address];  Service: General;  Laterality: Right;    HYSTERECTOMY  1990    FRANDY (AUB, Fibroids), ovaries remain    REPAIR, HERNIA, UMBILICAL N/A 03/23/2023    Procedure: REPAIR, HERNIA, UMBILICAL;  Surgeon: Katherine Segura MD;  Location: [address];  Service: General;  Laterality: N/A;    RIGHT HEART CATHETERIZATION Right 01/05/2021    Procedure: INSERTION, CATHETER, RIGHT HEART;  Surgeon: Aureliano Sy MD;  Location: [facility] CATH LAB;  Service: Cardiology;  Laterality: Right;    RIGHT HEART CATHETERIZATION Right 03/16/2023    Procedure: INSERTION, CATHETER, RIGHT HEART;  Surgeon: Aureliano Sy MD;  Location: [facility] CATH LAB;  Service: Cardiology;  Laterality: Right;    ROBOT-ASSISTED LAPAROSCOPIC REPAIR OF INGUINAL HERNIA USING DA VERNELL XI Right 05/26/2023    Procedure: XI ROBOTIC REPAIR, HERNIA, INGUINAL, FEMORAL;  Surgeon: Katherine Segura MD;  Location: [facility] OR [address] [place] [address];  Service: General;  Laterality: Right;    ROBOT-ASSISTED REPAIR OF HIATAL HERNIA USING DA VERNELL XI N/A 03/23/2023     Procedure: XI ROBOTIC REPAIR, HERNIA, HIATAL;  Surgeon: Katherine Segura MD;  Location: [facility] OR 2ND FLR;  Service: General;  Laterality: N/A;    VARICOSE VEIN SURGERY      XI ROBOTIC GASTROPEXY N/A 03/23/2023    Procedure: XI ROBOTIC GASTROPEXY;  Surgeon: Katherine Segura MD;  Location: [facility] OR 2ND FLR;  Service: General;  Laterality: N/A;    XI ROBOTIC PYLOROMYOTOMY N/A 03/23/2023    Procedure: XI ROBOTIC PYLOROMYOTOMY;  Surgeon: Katherine Segura MD;  Location: [facility] OR 2ND FLR;  Service: General;  Laterality: N/A;       Allergies: Spironolactone, Sulfa (sulfonamide antibiotics), and Tramadol    Current Outpatient Medications   Medication Sig    acetaminophen-codeine 300-60mg (TYLENOL #4) 300-60 mg Tab Take 1 tablet by mouth every evening.    albuterol (PROVENTIL) 2.5 mg /3 mL (0.083 %) nebulizer solution Take 3 mLs (2.5 mg total) by nebulization every 4 (four) hours as needed for Wheezing or Shortness of Breath (or cough). Rescue    albuterol (VENTOLIN HFA) 90 mcg/actuation inhaler Inhale 2 puffs into the lungs every 6 (six) hours as needed for Wheezing. Rescue    albuterol sulfate 2.5 mg/0.5 mL Nebu Take 2.5 mg by nebulization every 4 (four) hours as needed (shortness of breath/wheezing). Rescue    ergocalciferol (ERGOCALCIFEROL) 50,000 unit Cap TAKE 1 CAPSULE BY MOUTH EVERY 7 DAYS    furosemide (LASIX) 20 MG tablet Take 1 tablet (20 mg total) by mouth once daily.    multivitamin capsule Take 1 capsule by mouth once daily.    mycophenolate mofetil (CELLCEPT) 200 mg/mL SusR Take 5 mLs (1,000 mg total) by mouth 2 (two) times daily.    nabumetone (RELAFEN) 750 MG tablet Take 1 tablet (750 mg total) by mouth daily as needed for Pain.    nebulizer and compressor Rosemary Use as directed    NIFEdipine (ADALAT CC) 90 MG TbSR TAKE 1 TABLET BY MOUTH EVERY EVENING WITH 30 MG FOR A TOTAL OF 120MG    NIFEdipine (PROCARDIA-XL) 30 MG (OSM) 24 hr tablet TAKE 1 TABLET(30 MG) BY MOUTH EVERY DAY    nintedanib  (OFEV) 100 mg Cap Take 100 mg by mouth 2 (two) times a day.    pravastatin (PRAVACHOL) 20 MG tablet TAKE 1 TABLET(20 MG) BY MOUTH EVERY EVENING    pregabalin (LYRICA) 300 MG Cap Take 1 capsule (300 mg total) by mouth 2 (two) times daily.    PREVIDENT 5000 BOOSTER PLUS 1.1 % Pste SMARTSIG:To Teeth    PREVIDENT 5000 SENSITIVE 1.1-5 % Pste BRUSH FOR 2 MINUTES TWICE PER DAY    RABEprazole (ACIPHEX) 20 mg tablet Take 1 tablet (20 mg total) by mouth 2 (two) times daily.    tadalafil (ADCIRCA) 20 mg Tab Take 2 tablets (40 mg total) by mouth once daily.    tiZANidine (ZANAFLEX) 4 MG tablet Take 2 tablets (8 mg total) by mouth 2 (two) times daily as needed (muscle pain).     No current facility-administered medications for this visit.     Facility-Administered Medications Ordered in Other Visits   Medication    fentaNYL 50 mcg/mL injection 25 mcg    haloperidol lactate injection 0.5 mg    sodium chloride 0.9% flush 10 mL       Immunization History   Administered Date(s) Administered    COVID-19 MRNA, LN-S PF (MODERNA HALF 0.25 ML DOSE) 04/21/2022    COVID-19, MRNA, LN-S, PF (MODERNA FULL 0.5 ML DOSE) 02/09/2021, 03/12/2021, 09/21/2021    COVID-19, MRNA, LN-S, PF (Pfizer) (Purple Cap) 10/13/2022    COVID-19, mRNA, LNP-S, PF (Moderna 2023)Ages 12+ 10/18/2023, 05/02/2024    COVID-19, mRNA, LNP-S, bivalent booster, PF (Moderna Omicron)12 + YEARS 10/13/2022, 10/13/2022    Influenza 11/02/2015, 09/21/2021, 09/22/2023    Influenza (FLUAD) - Quadrivalent - Adjuvanted - PF *Preferred* (65+) 09/11/2020, 09/12/2022, 09/22/2023    Influenza - High Dose - PF (65 years and older) 10/09/2017, 10/11/2017, 10/27/2018, 09/09/2019    Influenza - Quadrivalent - PF *Preferred* (6 months and older) 11/03/2006, 10/08/2007, 09/29/2009, 09/26/2014, 09/26/2014, 11/02/2015, 11/02/2015, 09/27/2016    Influenza - Trivalent (ADULT) 11/03/2006, 10/08/2007, 09/29/2009, 09/26/2014    Influenza - Trivalent - PF (ADULT) 11/02/2015    Influenza Split  11/03/2006, 10/08/2007, 09/29/2009, 09/26/2014    Pneumococcal Conjugate - 13 Valent 01/16/2013, 01/16/2013    Pneumococcal Polysaccharide - 23 Valent 09/27/2016, 11/12/2021    RSVpreF (Arexvy) 10/27/2023    Tdap 02/06/2019    Zoster 01/16/2013    Zoster Recombinant 11/30/2018, 02/12/2019     Family History:    Family History   Problem Relation Name Age of Onset    Breast cancer Mother Tiki Singh     Cancer Mother Tiki Singh         Breast    Hypertension Father Madi     Diabetes Father Madi         Amputation of legs    Breast cancer Sister Brenda     Diabetes Sister Brenda     Arthritis Sister Brenda     Cancer Sister Brenda         Breast    Osteoarthritis Brother Luis     Diabetes Brother Luis     Arthritis Brother Luis     Birth defects Brother Luis         Polio at birth, recently had knee replacement surgery on left knee    No Known Problems Daughter      No Known Problems Daughter      No Known Problems Son      No Known Problems Son      Breast cancer Maternal Aunt Gege     Cancer Maternal Aunt Gege         Breast    Early death Sister Philomena     Melanoma Neg Hx      Colon cancer Neg Hx      Crohn's disease Neg Hx      Stomach cancer Neg Hx      Ulcerative colitis Neg Hx      Rectal cancer Neg Hx      Irritable bowel syndrome Neg Hx      Esophageal cancer Neg Hx      Celiac disease Neg Hx      Ovarian cancer Neg Hx      Liver cancer Neg Hx      Pancreatic cancer Neg Hx       Social History     Substance and Sexual Activity   Alcohol Use Not Currently    Comment: wine occasionally      Social History     Substance and Sexual Activity   Drug Use No      Social History     Socioeconomic History    Marital status:      Spouse name: Lewis    Number of children: 4   Occupational History    Occupation: -retired     Employer: [employer]     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 No    Breast-feeding No   Social History Narrative         Social Determinants of Health     Financial Resource Strain: Low Risk  (3/26/2024)    Overall Financial Resource Strain (CARDIA)     Difficulty of Paying Living Expenses: Not hard at all   Food Insecurity: No Food Insecurity (3/26/2024)    Hunger Vital Sign     Worried About Running Out of Food in the Last Year: Never true     Ran Out of Food in the Last Year: Never true   Transportation Needs: No Transportation Needs (3/26/2024)    PRAPARE - Transportation     Lack of Transportation (Medical): No     Lack of Transportation (Non-Medical): No   Physical Activity: Insufficiently Active (3/26/2024)    Exercise Vital Sign     Days of Exercise per Week: 1 day     Minutes of Exercise per Session: 10 min   Stress: No Stress Concern Present (3/26/2024)    [employer] - Occupational Stress Questionnaire     Feeling of Stress : Only a little   Housing Stability: Low Risk  (3/26/2024)    Housing Stability Vital Sign     Unable to Pay for Housing in the Last Year: No     Number of Places Lived in the Last Year: 1     Unstable Housing in the Last Year: No     Review of Systems   Constitutional:  Negative for chills, diaphoresis, fever, malaise/fatigue and weight loss.   HENT:  Negative for congestion, ear discharge, ear pain, hearing loss, nosebleeds, sinus pain, sore throat and tinnitus.    Eyes:  Negative for blurred vision, double vision, photophobia, pain, discharge and redness.   Respiratory:  Positive for shortness of breath (heavy exertion only). Negative for cough, hemoptysis, sputum production, wheezing and stridor.     Cardiovascular:  Negative for chest pain, palpitations, orthopnea, claudication, leg swelling and PND.   Gastrointestinal:  Positive for heartburn. Negative for abdominal pain, blood in stool, constipation, diarrhea, melena, nausea and vomiting.   Genitourinary:  Negative for dysuria, flank pain, frequency, hematuria and urgency.   Musculoskeletal:  Negative for back pain, falls, joint pain, myalgias and neck pain.   Skin:  Negative for itching and rash.   Neurological:  Negative for dizziness, tingling, tremors, sensory change, speech change, focal weakness, seizures, loss of consciousness, weakness and headaches.   Endo/Heme/Allergies:  Negative for environmental allergies and polydipsia. Does not bruise/bleed easily.   Psychiatric/Behavioral:  Negative for depression, hallucinations, memory loss, substance abuse and suicidal ideas. The patient is not nervous/anxious and does not have insomnia.      Vitals  There were no vitals taken for this visit.  Physical Exam  Vitals and nursing note reviewed.   Constitutional:       General: She is not in acute distress.     Appearance: She is well-developed. She is not diaphoretic.   HENT:      Head: Normocephalic and atraumatic.      Nose: Nose normal.      Mouth/Throat:      Pharynx: No oropharyngeal exudate.   Eyes:      General: No scleral icterus.     Conjunctiva/sclera: Conjunctivae normal.      Pupils: Pupils are equal, round, and reactive to light.   Neck:      Thyroid: No thyromegaly.      Vascular: No JVD.      Trachea: No tracheal deviation.   Cardiovascular:      Rate and Rhythm: Normal rate and regular rhythm.      Heart sounds: Normal heart sounds. No murmur heard.     No friction rub. No gallop.   Pulmonary:      Effort: Pulmonary effort is normal. No respiratory distress.      Breath sounds: Wheezing and rales present.      Comments: Diffuse expiratory squeaks  Abdominal:      General: Bowel sounds are normal. There is no distension.      Palpations:  Abdomen is soft.      Tenderness: There is no abdominal tenderness.   Musculoskeletal:         General: No deformity. Normal range of motion.      Cervical back: Normal range of motion and neck supple.   Skin:     General: Skin is warm and dry.      Capillary Refill: Capillary refill takes less than 2 seconds.      Coloration: Skin is not pale.      Findings: No erythema or rash.      Comments: Skin tightening.  Digital ulcers and nailbed deformities.  Bilateral foot ulcers in dry intact dressing     Neurological:      Mental Status: She is alert and oriented to person, place, and time.      Cranial Nerves: No cranial nerve deficit.   Psychiatric:         Judgment: Judgment normal.         Labs:          8/22/2024     2:13 PM 3/4/2024     1:35 PM 9/18/2023    11:06 AM 8/17/2023     1:27 PM 5/23/2023     9:20 AM 10/7/2022    12:56 PM 1/3/2022    10:43 AM   Pulmonary Function Tests   FVC 1.77 liters 1.81 liters 1.76 liters 1.79 liters 1.9 liters 1.82 liters 1.83 liters   FEV1 1.46 liters 1.43 liters 1.46 liters 1.42 liters 1.58 liters 1.49 liters 1.44 liters   TLC (liters) 2.99 liters 3.28 liters 2.7 liters 3.11 liters 3.41 liters 3.2 liters    DLCO (ml/mmHg sec) 10.7 ml/mmHg sec 10.62 ml/mmHg sec 9.15 ml/mmHg sec 10.8 ml/mmHg sec 11.37 ml/mmHg sec 11.89 ml/mmHg sec    FVC% 61.9 63 66.1 61.8 71.1 67 67   FEV1% 66.2 64 70.5 63.8 75.6 71 68   FEF 25-75 1.48 1.32 1.64 1.35 1.82 1.7    FEF 25-75% 58.4 72 93.5 73.2 103.5 95    TLC% 58.6 64 52.8 60.9 66.8 63    RV 1.18 1.33 0.94 1.32 1.46 1.33    RV% 55.2 62 44.2 62.2 68.5 63    DLCO% 49.5 49 42.1 49.7 52.3 54          8/22/2024    12:04 PM 4/29/2024     2:06 PM 3/4/2024    12:40 PM 9/18/2023    10:57 AM 8/17/2023     1:29 PM 5/22/2023     9:20 AM 2/28/2023    12:42 PM   6MW   6MWT Status completed without stopping completed without stopping completed without stopping completed without stopping completed without stopping completed without stopping completed without stopping    Patient Reported Leg pain Dyspnea;Leg pain Other (Comment) Dyspnea Dyspnea No complaints Other (Comment)   Was O2 used? No No No No No No No   6MW Distance walked (feet) 1275 feet 1532 feet 1562 feet 1450 feet 1300 feet 1500 feet 1500 feet   Distance walked (meters) 388.62 meters 466.95 meters 476.1 meters 441.96 meters 396.24 meters 457.2 meters 457.2 meters   Did patient stop? No No No No No No No   Oxygen Saturation 97 % 99 % 100 % 100 % 98 % 100 % 98 %   Supplemental Oxygen Room Air Room Air Room Air Room Air Room Air Room Air Room Air   Heart Rate 68 bpm 77 bpm 77 bpm 75 bpm 70 bpm 62 bpm 74 bpm   Blood Pressure 125/63 133/66 134/88 152/67 141/63 109/51 133/63   Ju Dyspnea Rating  somewhat heavy moderate light light light moderate moderate   Oxygen Saturation 97 % 97 % 96 % 98 % 96 % 98 % 94 %   Supplemental Oxygen Room Air Room Air Room Air Room Air Room Air Room Air Room Air   Heart Rate 86 bpm 98 bpm 111 bpm 102 bpm 98 bpm 90 bpm 113 bpm   Blood Pressure 133/56 167/63 156/80 168/71 159/70 136/62 169/76   Ju Dyspnea Rating  moderate somewhat heavy somewhat heavy moderate somewhat heavy somewhat heavy somewhat heavy   Recovery Time (seconds) 65 seconds 157 seconds 121 seconds 123 seconds 100 seconds 131 seconds 160 seconds   Oxygen Saturation 99 % 100 % 100 % 100 % 100 % 100 % 97 %   Supplemental Oxygen Room Air  Room Air Room Air Room Air Room Air Room Air   Heart Rate 78 bpm 88 bpm 84 bpm 90 bpm 82 bpm 71 bpm 84 bpm       Imaging:  Results for orders placed during the hospital encounter of 12/22/20    CT Chest Without Contrast    Narrative  EXAMINATION:  CT CHEST WITHOUT CONTRAST    CLINICAL HISTORY:  pulmonary hypertension; Pulmonary hypertension, unspecified    TECHNIQUE:  Using low dose technique the chest was surveyed from above the pulmonary apices through the posterior costophrenic angles.  Data was reconstructed for multiplanar images in axial, sagittal and coronal planes and for maximal  intensity projection images in the axial plane.    All CT scans at this facility use dose modulation, iterative reconstruction and/or weight based dosing when appropriate to reduce radiation dose to as low as reasonably achievable.    Xray dose: DLP = 152.03 mGy-cm.    COMPARISON:  CT chest abdomen without contrast 03/13/2019.  CT chest without contrast 09/18/2015.    FINDINGS:  Base of Neck: No significant abnormality.    Aorta: Left-sided aortic arch with 3 arterial branches. The aorta maintains normal caliber, contour and course. There is mild calcification of the thoracic aorta or coronary arteries.    Heart/pericardium: Normal size.  No pericardial effusion or calcification.  Calcification of the aortic valve annulus.    Pulmonary vasculature: Pulmonary arteries distribute normally.  Pulmonary artery size is neither specific nor sensitive for normal or elevated pulmonary arterial pressures.    -There are four pulmonary veins.    Barbara/Mediastinum: No pathologic noelle enlargement.    Esophagus: The esophagus is mildly dilated with dependent fluid, similar to prior exam.    Upper Abdomen: No abnormality of the partially imaged upper abdomen.    Thoracic soft tissues: Normal. Both breasts are present.    Bones: No acute fracture. No suspicious lytic or sclerotic lesion.    Airways: Patent.    Lungs/pleura:    -Stable biapical scarring.    -Redemonstration of cystic changes and reticulation plus subsegmental ground-glass disease, most extensive in the lower lung zones.  Radiographic appearance is more typical for NSIP but could represent UIP in this patient with scleroderma.    -There are stable scattered foci of tree-in-bud nodularities, for example within the superior segment of the right lower lobe (axial series 4, image 211).  There is associated dilatation of multiple adjacent small airways.    -0.4 cm solid pulmonary nodule in the apical segment of the right upper lobe (axial series 4, image 83), stable dating  back to 2015.    -No pleural fluid or thickening.No pleural calcification. No pneumothorax.    Impression  1.  Overall stable appearing chronic interstitial lung disease consistent with patient's history of scleroderma.  Radiographic appearance is more typical for NSIP and UIP, noting that UIP is a more common disease.    2.  Previously characterized tree-in-bud opacity along the superior aspect of the right oblique fissure appears stable and may reflect chronic inflammatory process.    3.  0.4 cm solid right upper lobe pulmonary nodule, stable dating back to 09/18/2015.  Such stability favors benign etiology.    4.  Persistent fluid-filled esophagus as noted on multiple priors placing the patient at increased risk for aspiration.  This may be related to the patient's history of scleroderma; clinical considerations will determine if further assessment of esophageal motility is warranted.    Electronically signed by resident: Leobardo Arellano  Date:    12/22/2020  Time:    11:43    Electronically signed by: Maxine Del Cid MD  Date:    12/22/2020  Time:    14:23      Cardiodiagnostics:  Results for orders placed during the hospital encounter of 12/29/21    Echo    Interpretation Summary  · The left ventricle is normal in size with normal systolic function.  · The estimated ejection fraction is 63%.  · Normal left ventricular diastolic function.  · Normal right ventricular size with normal right ventricular systolic function.  · Mild pulmonic regurgitation.  · Normal central venous pressure (3 mmHg).  · The estimated PA systolic pressure is 32 mmHg.  · Trivial posterior pericardial effusion.    Results for orders placed during the hospital encounter of 08/17/23    Echo    Interpretation Summary    Left Ventricle: The left ventricle is normal in size. Normal wall thickness. Normal wall motion. There is normal systolic function with a visually estimated ejection fraction of 60 - 65%. There is normal diastolic  function.    Right Ventricle: Normal right ventricular cavity size. Wall thickness is normal. Right ventricle wall motion  is normal. Systolic function is normal.    Pulmonary Artery: The estimated pulmonary artery systolic pressure is 40 mmHg.      Assessment:  1. Scleroderma with pulmonary involvement    2. Gastroesophageal reflux disease, unspecified whether esophagitis present    3. WHO group 1 pulmonary arterial hypertension          Plan:     Followed for SSc-ILD with PAH. On MMF and OFEV. FVC/DLCO has remained stable for the last two years. Has known PH which is stable on Adcirca.     Continue to follow with GI for GERD/scleroderma esophagus. Encouraged sleeping on incline. Continue with PPI. Adequately controlled. Has follow up EGD/colonoscopy in 2025/2026.     Continue Adcirca and PH follow-up. Lasix increased per PH team for lower extremity edema. Scheduled for repeat RHC at the end of the year.     4. RTC in 6 months with PFTs and 6MWT       Claire Pierre PA-C  [facility]

## 2024-08-23 ENCOUNTER — PATIENT MESSAGE (OUTPATIENT)
Dept: PODIATRY | Facility: CLINIC | Age: 74
End: 2024-08-23
Payer: MEDICARE

## 2024-08-23 ENCOUNTER — TELEPHONE (OUTPATIENT)
Dept: PODIATRY | Facility: CLINIC | Age: 74
End: 2024-08-23
Payer: MEDICARE

## 2024-08-23 NOTE — TELEPHONE ENCOUNTER
Yamel HULL [facility] Podiatry Clinical Support Staff (supporting Terrie Irving MA)2 minutes ago (3:24 PM)       Please cancel this appointment date for Aug 28. It was previously rescheduled to Sept. 25th upon my request .

## 2024-08-23 NOTE — PROCEDURES
Yamel Shah is a 73 y.o.  female patient, who presents for a 6 minute walk test ordered by SEAN Pelaez.  The diagnosis is Interstitial Lung Disease; Connective Tissue Disease; Pulmonary Hypertension.  The patient's BMI is 24.4 kg/m2.  Predicted distance (lower limit of normal) is 305.98 meters.      Test Results:    The test was completed without stopping.  The total time walked was 360 seconds.  During walking, the patient reported:  Leg pain.  The patient used no assistive devices during testing.  Oxygen saturation was measured with forehead pulse oximetry probe.      08/22/2024---------Distance: 388.62 meters (1275 feet)     O2 Sat % Supplemental Oxygen Heart Rate Blood Pressure Ju Scale   Pre-exercise  (Resting) 97 % Room Air 68 bpm 125/63 mmHg 4   During Exercise 97 % Room Air 86 bpm 133/56 mmHg 3   Post-exercise  (Recovery) 99 % Room Air  78 bpm       Recovery Time: 65 seconds    Performing nurse/tech: KAYLEY Helm      PREVIOUS STUDY:   04/29/2024---------Distance: 466.95 meters (1532 feet)       O2 Sat % Supplemental Oxygen Heart Rate Blood Pressure Ju Scale   Pre-exercise  (Resting) 99 % Room Air 77 bpm 133/66 mmHg 3   During Exercise 97 % Room Air 98 bpm 167/63 mmHg 4   Post-exercise  (Recovery) 100 % Room Air   88 bpm 150/56 mmHg         CLINICAL INTERPRETATION:  Six minute walk distance is 388.62 meters (1275 feet) with somewhat heavy dyspnea.  During exercise, there was no significant desaturation while breathing room air.  Both blood pressure and heart rate remained stable with walking.  The patient reported non-pulmonary symptoms during exercise.  Since the previous study in April 2024, exercise capacity is significantly worse.  Based upon age and body mass index, exercise capacity is normal.

## 2024-08-24 DIAGNOSIS — E55.9 VITAMIN D DEFICIENCY: ICD-10-CM

## 2024-08-24 DIAGNOSIS — M34.9 SCLERODERMA WITH PULMONARY INVOLVEMENT: Primary | ICD-10-CM

## 2024-08-24 NOTE — PROGRESS NOTES
Per provider note Dr. Leigh, patient RTC February 2025 with PFTs, 6MWT. Start on room air, modified. Request to .

## 2024-08-24 NOTE — TELEPHONE ENCOUNTER
No care due was identified.  [facility] Embedded Care Due Messages. Reference number: [id number].   8/24/2024 1:36:22 PM CDT

## 2024-08-26 NOTE — TELEPHONE ENCOUNTER
Refill Routing Note   Medication(s) are not appropriate for processing by [facility] for the following reason(s):        Outside of protocol    [facility] action(s):  Route               Appointments  past 12m or future 3m with PCP    Date Provider   Last Visit   3/26/2024 Aly Rnad MD   Next Visit   10/21/2024 Aly Rand MD   ED visits in past 90 days: 0        Note composed:11:56 AM 08/26/2024

## 2024-08-27 ENCOUNTER — TELEPHONE (OUTPATIENT)
Dept: PULMONOLOGY | Facility: CLINIC | Age: 74
End: 2024-08-27
Payer: MEDICARE

## 2024-08-27 NOTE — TELEPHONE ENCOUNTER
----- Message from Greg Palencia sent at 8/27/2024 12:48 PM CDT -----  Contact: [facility]  .Type:  Pharmacy Calling to Clarify an RX    Name of Caller:[facility]  [facility] [place] [facility] AT Portal to Registered [facility]   Phone: [phone]  Fax: [phone]  Prescription Name:tadalafil (ADCIRCA) 20 mg Tab  Additional Information:  [facility] is calling to get a follow up on patient vision and hearing issues while on a medication

## 2024-08-27 NOTE — TELEPHONE ENCOUNTER
Returned call and spoke with Joana. Answered question in regards to hearing and vision problems, Let them know I do not see that listed on diagnosis list.

## 2024-08-28 RX ORDER — ERGOCALCIFEROL 1.25 MG/1
50000 CAPSULE ORAL
Qty: 12 CAPSULE | Refills: 1 | Status: SHIPPED | OUTPATIENT
Start: 2024-08-28

## 2024-08-29 ENCOUNTER — PATIENT MESSAGE (OUTPATIENT)
Dept: ADMINISTRATIVE | Facility: CLINIC | Age: 74
End: 2024-08-29
Payer: MEDICARE

## 2024-08-30 ENCOUNTER — TELEPHONE (OUTPATIENT)
Dept: ADMINISTRATIVE | Facility: CLINIC | Age: 74
End: 2024-08-30
Payer: MEDICARE

## 2024-08-30 NOTE — TELEPHONE ENCOUNTER
Called pt; informed pt I was calling to confirm her virtual EAWV on 9/3/24 at 11:00am and to see if she needed any help; pt stated she did not need any help and would complete e-pre check later today; pt informed to login 10 minutes prior to appt time

## 2024-09-03 ENCOUNTER — OFFICE VISIT (OUTPATIENT)
Dept: HOME HEALTH SERVICES | Facility: CLINIC | Age: 74
End: 2024-09-03
Payer: MEDICARE

## 2024-09-03 DIAGNOSIS — D84.821 IMMUNOSUPPRESSION DUE TO DRUG THERAPY: ICD-10-CM

## 2024-09-03 DIAGNOSIS — I27.29 PULMONARY HYPERTENSION ASSOCIATED WITH SYSTEMIC DISORDER: ICD-10-CM

## 2024-09-03 DIAGNOSIS — Z00.00 ENCOUNTER FOR PREVENTIVE HEALTH EXAMINATION: Primary | ICD-10-CM

## 2024-09-03 DIAGNOSIS — J84.9 ILD (INTERSTITIAL LUNG DISEASE): ICD-10-CM

## 2024-09-03 DIAGNOSIS — I70.0 CALCIFICATION OF AORTA: ICD-10-CM

## 2024-09-03 DIAGNOSIS — I27.9 CHRONIC PULMONARY HEART DISEASE: ICD-10-CM

## 2024-09-03 DIAGNOSIS — Z79.899 IMMUNOSUPPRESSION DUE TO DRUG THERAPY: ICD-10-CM

## 2024-09-03 DIAGNOSIS — M34.9 SCLERODERMA WITH PULMONARY INVOLVEMENT: ICD-10-CM

## 2024-09-03 DIAGNOSIS — I27.29 PULMONARY HYPERTENSION ASSOCIATED WITH SYSTEMIC DISORDER: Primary | ICD-10-CM

## 2024-09-03 DIAGNOSIS — Z79.899 POLYPHARMACY: ICD-10-CM

## 2024-09-03 PROCEDURE — G0439 PPPS, SUBSEQ VISIT: HCPCS | Mod: 95,NTX,, | Performed by: NURSE PRACTITIONER

## 2024-09-03 NOTE — PROGRESS NOTES
The patient location is: [place]  The chief complaint leading to consultation is: awv    Visit type: audiovisual    Face to Face time with patient: 25  60 minutes of total time spent on the encounter, which includes face to face time and non-face to face time preparing to see the patient (eg, review of tests), Obtaining and/or reviewing separately obtained history, Documenting clinical information in the electronic or other health record, Independently interpreting results (not separately reported) and communicating results to the patient/family/caregiver, or Care coordination (not separately reported).         Each patient to whom he or she provides medical services by telemedicine is:  (1) informed of the relationship between the physician and patient and the respective role of any other health care provider with respect to management of the patient; and (2) notified that he or she may decline to receive medical services by telemedicine and may withdraw from such care at any time.    Notes:       Yamel Shah presented for an initial Medicare AWV today. The following components were reviewed and updated:    Medical history  Family History  Social history  Allergies and Current Medications  Health Risk Assessment  Health Maintenance  Care Team    **See Completed Assessments for Annual Wellness visit with in the encounter summary    The following assessments were completed:  Depression Screening  Cognitive function Screening  Timed Get Up Test  Whisper Test      Opioid documentation:      Patient does not have a current opioid prescription.          There were no vitals filed for this visit.  There is no height or weight on file to calculate BMI.       Physical Exam  Constitutional:       Appearance: Normal appearance.   Neurological:      Mental Status: She is alert.   Psychiatric:         Attention and Perception: Attention and perception normal.         Mood and Affect: Mood and affect normal.          Speech: Speech normal.         Behavior: Behavior normal. Behavior is cooperative.         Thought Content: Thought content normal.         Cognition and Memory: Cognition normal. She exhibits impaired recent memory.         Judgment: Judgment normal.           Diagnoses and health risks identified today and associated recommendations/orders:  1. Encounter for preventive health examination  Stable, followed by provider    2. ILD (interstitial lung disease)  Stable, followed by provider, albuterol    3. Pulmonary hypertension associated with systemic disorder  Stable, followed by provider, takes lasix    4. Calcification of aorta  Stable, followed by provider, takes pravastatin    5. Chronic pulmonary heart disease  Stable, followed by provider, takes lasix    6. Scleroderma with pulmonary involvement  Stable, followed by provider, takes cellcept    7. Immunosuppression due to drug therapy  Stable, followed by provider, takes cellcept      Provided Yamel with a 5-10 year written screening schedule and personal prevention plan. Recommendations were developed using the USPSTF age appropriate recommendations. Education, counseling, and referrals were provided as needed.  After Visit Summary printed and given to patient which includes a list of additional screenings\tests needed.    Follow up in about 1 year (around 9/3/2025) for your next annual wellness visit.      J Luis Travis NP  I offered to discuss advanced care planning, including how to pick a person who would make decisions for you if you were unable to make them for yourself, called a health care power of , and what kind of decisions you might make such as use of life sustaining treatments such as ventilators and tube feeding when faced with a life limiting illness recorded on a living will that they will need to know. (How you want to be cared for as you near the end of your natural life)     X Patient is interested in learning more about how to  make advanced directives.  I provided them paperwork and offered to discuss this with them.

## 2024-09-03 NOTE — PATIENT INSTRUCTIONS
Counseling and Referral of Other Preventative  (Italic type indicates deductible and co-insurance are waived)    Patient Name: Yamel Shah  Today's Date: 9/3/2024    Health Maintenance       Date Due Completion Date    Influenza Vaccine (1) 09/01/2024 9/22/2023    DEXA Scan 10/07/2024 10/7/2022    Mammogram 07/30/2025 7/30/2024    Colorectal Cancer Screening 05/10/2026 5/10/2021    Lipid Panel 10/19/2028 10/19/2023    TETANUS VACCINE 02/06/2029 2/6/2019        No orders of the defined types were placed in this encounter.    The following information is provided to all patients.  This information is to help you find resources for any of the problems found today that may be affecting your health:                  Living healthy guide: www.[facility].[place].gov      Understanding Diabetes: www.diabetes.org      Eating healthy: www.cdc.gov/healthyweight      [facility] home safety checklist: www.cdc.gov/steadi/patient.html      Agency on Aging: www.goea.[place].gov      Alcoholics anonymous (AA): www.aa.org      Physical Activity: www.damon.nih.gov/cs3kodz      Tobacco use: www.quitwithusla.org

## 2024-09-06 ENCOUNTER — PATIENT MESSAGE (OUTPATIENT)
Dept: TRANSPLANT | Facility: CLINIC | Age: 74
End: 2024-09-06
Payer: MEDICARE

## 2024-09-06 ENCOUNTER — TELEPHONE (OUTPATIENT)
Dept: TRANSPLANT | Facility: CLINIC | Age: 74
End: 2024-09-06
Payer: MEDICARE

## 2024-09-06 NOTE — TELEPHONE ENCOUNTER
Spoke with patient, per Dr. Leigh, patient has been scheduled for follow up on  at 2pm. She reports she plans to return from [place] on . She is aware she will need to go to the hospital to get a CD of the CT and Xray completed, to bring to her appointment. All questions answered at this time.   ----- Message from Kassie Jiang MA sent at 2024  2:03 PM CDT -----  Regarding: Need a recommendation  Patient sent the following portal message to the PH team this mornin/6/24   This morning when I woke up, what I thought was phlegm from acid reflux, instead I was coughing up blood. I went to the bathroom and continued coughing up blood. Not a whole lot but enough to be concerned. I am currently in ER at [facility] in [facility]. I am awaiting text results. I thought I would let you know my progress. This was a scare I didn't expect while visiting my daughter since she had surgery this week. I am planning to arrive back to [place] on Tuesday.      Update, based on CT scan and chest X-ray- they see a 2cm of mass they want me to check with [facility] or you guys. If I have to make appt with you guys, I need to know when. I tried sending final report but the attachments are not allowing it.       Do we need to see her next week? Or should she see her PH provider?

## 2024-09-10 ENCOUNTER — TELEPHONE (OUTPATIENT)
Dept: TRANSPLANT | Facility: CLINIC | Age: 74
End: 2024-09-10
Payer: MEDICARE

## 2024-09-10 DIAGNOSIS — I27.21 WHO GROUP 1 PULMONARY ARTERIAL HYPERTENSION: ICD-10-CM

## 2024-09-11 RX ORDER — FUROSEMIDE 20 MG/1
20 TABLET ORAL DAILY
Qty: 30 TABLET | Refills: 5 | Status: SHIPPED | OUTPATIENT
Start: 2024-09-11 | End: 2025-09-11

## 2024-09-12 ENCOUNTER — TELEPHONE (OUTPATIENT)
Dept: TRANSPLANT | Facility: CLINIC | Age: 74
End: 2024-09-12
Payer: MEDICARE

## 2024-09-13 ENCOUNTER — TELEPHONE (OUTPATIENT)
Dept: TRANSPLANT | Facility: CLINIC | Age: 74
End: 2024-09-13

## 2024-09-13 ENCOUNTER — HOSPITAL ENCOUNTER (OUTPATIENT)
Dept: RADIOLOGY | Facility: HOSPITAL | Age: 74
Discharge: HOME | End: 2024-09-13
Attending: PHYSICIAN ASSISTANT
Payer: MEDICARE

## 2024-09-13 ENCOUNTER — OFFICE VISIT (OUTPATIENT)
Dept: TRANSPLANT | Facility: CLINIC | Age: 74
End: 2024-09-13
Payer: MEDICARE

## 2024-09-13 VITALS
DIASTOLIC BLOOD PRESSURE: 61 MMHG | HEIGHT: 65 IN | RESPIRATION RATE: 16 BRPM | BODY MASS INDEX: 23.99 KG/M2 | HEART RATE: 71 BPM | TEMPERATURE: 98 F | SYSTOLIC BLOOD PRESSURE: 136 MMHG | OXYGEN SATURATION: 100 % | WEIGHT: 144 LBS

## 2024-09-13 DIAGNOSIS — K21.9 GASTROESOPHAGEAL REFLUX DISEASE, UNSPECIFIED WHETHER ESOPHAGITIS PRESENT: ICD-10-CM

## 2024-09-13 DIAGNOSIS — I27.21 WHO GROUP 1 PULMONARY ARTERIAL HYPERTENSION: ICD-10-CM

## 2024-09-13 DIAGNOSIS — M34.9 SCLERODERMA WITH PULMONARY INVOLVEMENT: Primary | ICD-10-CM

## 2024-09-13 DIAGNOSIS — R04.2 HEMOPTYSIS: ICD-10-CM

## 2024-09-13 DIAGNOSIS — R93.89 ABNORMAL CHEST CT: Primary | ICD-10-CM

## 2024-09-13 PROCEDURE — 99215 OFFICE O/P EST HI 40 MIN: CPT | Mod: PBBFAC,TXP,25 | Performed by: PHYSICIAN ASSISTANT

## 2024-09-13 PROCEDURE — 71250 CT THORAX DX C-: CPT | Mod: TC,NTX

## 2024-09-13 PROCEDURE — 99999 PR PBB SHADOW E&M-EST. PATIENT-LVL V: CPT | Mod: PBBFAC,TXP,, | Performed by: PHYSICIAN ASSISTANT

## 2024-09-13 PROCEDURE — 71250 CT THORAX DX C-: CPT | Mod: 26,NTX,, | Performed by: RADIOLOGY

## 2024-09-13 NOTE — LETTER
September 13, 2024        Luis Ahuja  [address]  [facility] [address]  Phone: [phone]  Fax: [phone]             [facility] - [facility] [address]  [address]  [facility] [address]  Phone: [phone]   Patient: Yamel Shah   MR Number: [id number]   YOB: 1950   Date of Visit: 9/13/2024       Dear Dr. Luis Ahuja    Thank you for referring Yamel Shah to me for evaluation. Attached you will find relevant portions of my assessment and plan of care.    If you have questions, please do not hesitate to call me. I look forward to following Yamel Shah along with you.    Sincerely,    FABRIZIO Ramososure    If you would like to receive this communication electronically, please contact [email or web address] or [phone] to request [employer] access.    [employer] is a tool which provides read-only access to select patient information with whom you have a relationship. Its easy to use and provides real time access to review your patients record including encounter summaries, notes, results, and demographic information.    If you feel you have received this communication in error or would no longer like to receive these types of communications, please e-mail [email or web address]

## 2024-09-13 NOTE — PROGRESS NOTES
ADVANCED LUNG DISEASE CLINIC FOLLOW-UP                                                                                                                                             Reason for Visit:  SSc-ILD    Referring Physician: No ref. provider found    History of Present Illness: Yamel Shah is a 73 y.o. female who is on 0L of oxygen.  She is on no assisted ventilation.  Her New York Heart Association Class is II and a Karnofsky score of 90% - Able to carry on normal activity: minor symptoms of disease. She is not diabetic.    Patient presents today for routine follow up. Recently hospitalized for episodes of stanislav hemoptysis when visiting family in [place] (9/6). Remains on MMF, lasix, and OFEV. States her reflux is well controlled most of the time. Reports progression of her dyspnea since earlier this year. Denies consitutional symptoms. Her initial episodes of hemoptysis resolved until yesterday when she had an episode of bloody sputum.     Past Medical History:   Diagnosis Date    Abnormal Pap smear     Acid reflux     Allergy     Arthritis     Encounter for blood transfusion     GERD (gastroesophageal reflux disease)     Hiatal hernia 03/24/2023    History of migraine headaches     Hx of colonic polyp     Hypertension     Idiopathic neuropathy 07/20/2012    ILD (interstitial lung disease) 11/06/2013    Iron deficiency anemia 03/18/2014    MRSA carrier     Osteopenia     Pneumonia     Pulmonary fibrosis     Pulmonary hypertension     Raynaud's disease     Scleroderma, diffuse     Sjogren's syndrome     Vitamin D deficiency 11/14/2013       Past Surgical History:   Procedure Laterality Date    24 HOUR IMPEDANCE PH MONITORING OF ESOPHAGUS IN PATIENT NOT TAKING ACID REDUCING MEDICATIONS N/A 03/04/2020    Procedure: IMPEDANCE PH STUDY, ESOPHAGEAL, 24 HOUR, IN PATIENT NOT TAKING ACID REDUCING MEDICATION;  Surgeon: Annamaria Mendoza MD;  Location: [facility] ([address] [place] [address]);  Service: Endoscopy;  Laterality:  N/A;  OFF PPI/H2 Blocker   Motility Studies   Hold Narcotics x 1 days   Hold TCA x 1 days  2/26 - LVM attempting to confirm appt  2/27 - Confirmed appt    ANORECTAL MANOMETRY N/A 05/10/2021    Procedure: MANOMETRY, ANORECTAL with balloon expulsion test;  Surgeon: Annamaria Mendoza MD;  Location: [facility] (4TH FLR);  Service: Endoscopy;  Laterality: N/A;  order combined  covid test 5/7 [facility], instructions emailed-KPvt    BREAST BIOPSY      Left, benign    BRONCHOSCOPY N/A 10/2/2023    Procedure: bronch;  Surgeon: Roberta Leigh DO;  Location: [facility] OR 2ND FLR;  Service: Endoscopy;  Laterality: N/A;    CERVICAL CONIZATION   W/ LASER  1970    COLONOSCOPY      COLONOSCOPY N/A 03/29/2019    Procedure: COLONOSCOPY;  Surgeon: Annamaria Mendoza MD;  Location: [facility] (2ND FLR);  Service: Endoscopy;  Laterality: N/A;    COLONOSCOPY N/A 05/10/2021    Procedure: COLONOSCOPY;  Surgeon: Annamaria Mendoza MD;  Location: [facility] (4TH FLR);  Service: Endoscopy;  Laterality: N/A;  2nd floor-previous scopes done on 2nd floor, gastroparesis  full liquid diet x2 days, clear liquid x1 day prior to procedure  covid test 5/7 [employer], instructions emailed-KPvt  5/6 pt confirmed appt-KPvt    DILATION AND CURETTAGE OF UTERUS      ESOPHAGEAL MANOMETRY WITH MEASUREMENT OF IMPEDANCE N/A 03/04/2020    Procedure: MANOMETRY, ESOPHAGUS, WITH IMPEDANCE MEASUREMENT;  Surgeon: Annamaria Mendoza MD;  Location: [facility] (4TH FLR);  Service: Endoscopy;  Laterality: N/A;  OFF PPI/H2 Blocker   Motility Studies   Hold Narcotics x 1 days   Hold TCA x 1 days    ESOPHAGOGASTRODUODENOSCOPY      ESOPHAGOGASTRODUODENOSCOPY N/A 03/29/2019    Procedure: EGD (ESOPHAGOGASTRODUODENOSCOPY);  Surgeon: Annamaria Mendoza MD;  Location: [facility] ENDO (2ND FLR);  Service: Endoscopy;  Laterality: N/A;  pulmonary htn    ESOPHAGOGASTRODUODENOSCOPY N/A 02/02/2021    Procedure: ESOPHAGOGASTRODUODENOSCOPY (EGD);  Surgeon: Annamaria Mendoza MD;  Location: [facility] (2ND FLR);   Service: Endoscopy;  Laterality: N/A;  2nd floor gastroparesis  3 days full liquid diet and 1 day clears  covid test 1/30 primary care, instructions sent to [facility]    ESOPHAGOGASTRODUODENOSCOPY N/A 07/01/2022    Procedure: ESOPHAGOGASTRODUODENOSCOPY (EGD);  Surgeon: Annamaria Mendoza MD;  Location: [address]);  Service: Endoscopy;  Laterality: N/A;  2nd floor-gastroparesis  full liquid diet x3 days, clear liquid diet x1 day prior to procedure  fully vaccinated, instructions sent to [facility]  6/29-pt confirmed new arrival time-[facility]    EXCISION, LESION, STOMACH, LAPAROSCOPIC N/A 03/23/2023    Procedure: XI ROBOTIC EXCISION, LESION, STOMACH;  Surgeon: Katherine Segura MD;  Location: [address];  Service: General;  Laterality: N/A;    EXCISIONAL BIOPSY, LYMPH NODE Right 05/26/2023    Procedure: EXCISIONAL BIOPSY, LYMPH NODE, INGUINAL;  Surgeon: Katherine Segura MD;  Location: [address];  Service: General;  Laterality: Right;    HYSTERECTOMY  1990    FRANDY (AUB, Fibroids), ovaries remain    REPAIR, HERNIA, UMBILICAL N/A 03/23/2023    Procedure: REPAIR, HERNIA, UMBILICAL;  Surgeon: Katherine Segura MD;  Location: [address];  Service: General;  Laterality: N/A;    RIGHT HEART CATHETERIZATION Right 01/05/2021    Procedure: INSERTION, CATHETER, RIGHT HEART;  Surgeon: Aureliano Sy MD;  Location: [facility] CATH LAB;  Service: Cardiology;  Laterality: Right;    RIGHT HEART CATHETERIZATION Right 03/16/2023    Procedure: INSERTION, CATHETER, RIGHT HEART;  Surgeon: Aureliano Sy MD;  Location: [facility] CATH LAB;  Service: Cardiology;  Laterality: Right;    ROBOT-ASSISTED LAPAROSCOPIC REPAIR OF INGUINAL HERNIA USING DA VERNELL XI Right 05/26/2023    Procedure: XI ROBOTIC REPAIR, HERNIA, INGUINAL, FEMORAL;  Surgeon: Katherine Segura MD;  Location: [address];  Service: General;  Laterality: Right;    ROBOT-ASSISTED REPAIR OF HIATAL HERNIA USING DA  VERNELL XI N/A 03/23/2023    Procedure: XI ROBOTIC REPAIR, HERNIA, HIATAL;  Surgeon: Katherine Segura MD;  Location: [facility] OR 2ND FLR;  Service: General;  Laterality: N/A;    VARICOSE VEIN SURGERY      XI ROBOTIC GASTROPEXY N/A 03/23/2023    Procedure: XI ROBOTIC GASTROPEXY;  Surgeon: Katherine Segura MD;  Location: [facility] OR 2ND FLR;  Service: General;  Laterality: N/A;    XI ROBOTIC PYLOROMYOTOMY N/A 03/23/2023    Procedure: XI ROBOTIC PYLOROMYOTOMY;  Surgeon: Katherine Segura MD;  Location: [facility] OR 2ND FLR;  Service: General;  Laterality: N/A;       Allergies: Spironolactone, Sulfa (sulfonamide antibiotics), and Tramadol    Current Outpatient Medications   Medication Sig    acetaminophen-codeine 300-60mg (TYLENOL #4) 300-60 mg Tab Take 1 tablet by mouth every evening.    albuterol (PROVENTIL) 2.5 mg /3 mL (0.083 %) nebulizer solution Take 3 mLs (2.5 mg total) by nebulization every 4 (four) hours as needed for Wheezing or Shortness of Breath (or cough). Rescue    albuterol (VENTOLIN HFA) 90 mcg/actuation inhaler Inhale 2 puffs into the lungs every 6 (six) hours as needed for Wheezing. Rescue    albuterol sulfate 2.5 mg/0.5 mL Nebu Take 2.5 mg by nebulization every 4 (four) hours as needed (shortness of breath/wheezing). Rescue    ergocalciferol (ERGOCALCIFEROL) 50,000 unit Cap TAKE 1 CAPSULE BY MOUTH EVERY 7 DAYS    furosemide (LASIX) 20 MG tablet Take 1 tablet (20 mg total) by mouth once daily.    multivitamin capsule Take 1 capsule by mouth once daily.    mycophenolate mofetil (CELLCEPT) 200 mg/mL SusR Take 5 mLs (1,000 mg total) by mouth 2 (two) times daily.    nabumetone (RELAFEN) 750 MG tablet Take 1 tablet (750 mg total) by mouth daily as needed for Pain.    nebulizer and compressor Rosemary Use as directed    NIFEdipine (ADALAT CC) 90 MG TbSR TAKE 1 TABLET BY MOUTH EVERY EVENING WITH 30 MG FOR A TOTAL OF 120MG    NIFEdipine (PROCARDIA-XL) 30 MG (OSM) 24 hr tablet TAKE 1 TABLET(30 MG) BY  MOUTH EVERY DAY    nintedanib (OFEV) 100 mg Cap Take 100 mg by mouth 2 (two) times a day.    pravastatin (PRAVACHOL) 20 MG tablet TAKE 1 TABLET(20 MG) BY MOUTH EVERY EVENING    pregabalin (LYRICA) 300 MG Cap Take 1 capsule (300 mg total) by mouth 2 (two) times daily.    PREVIDENT 5000 BOOSTER PLUS 1.1 % Pste SMARTSIG:To Teeth    PREVIDENT 5000 SENSITIVE 1.1-5 % Pste BRUSH FOR 2 MINUTES TWICE PER DAY    RABEprazole (ACIPHEX) 20 mg tablet Take 1 tablet (20 mg total) by mouth 2 (two) times daily.    tadalafil (ADCIRCA) 20 mg Tab Take 2 tablets (40 mg total) by mouth once daily.    tiZANidine (ZANAFLEX) 4 MG tablet Take 2 tablets (8 mg total) by mouth 2 (two) times daily as needed (muscle pain).     No current facility-administered medications for this visit.     Facility-Administered Medications Ordered in Other Visits   Medication    fentaNYL 50 mcg/mL injection 25 mcg    haloperidol lactate injection 0.5 mg    sodium chloride 0.9% flush 10 mL       Immunization History   Administered Date(s) Administered    COVID-19 MRNA, LN-S PF (MODERNA HALF 0.25 ML DOSE) 04/21/2022    COVID-19, MRNA, LN-S, PF (MODERNA FULL 0.5 ML DOSE) 02/09/2021, 03/12/2021, 09/21/2021    COVID-19, MRNA, LN-S, PF (Pfizer) (Purple Cap) 10/13/2022    COVID-19, mRNA, LNP-S, PF (Moderna 2023)Ages 12+ 10/18/2023, 05/02/2024    COVID-19, mRNA, LNP-S, bivalent booster, PF (Moderna Omicron)12 + YEARS 10/13/2022, 10/13/2022    Influenza 11/02/2015, 09/21/2021, 09/22/2023    Influenza (FLUAD) - Quadrivalent - Adjuvanted - PF *Preferred* (65+) 09/11/2020, 09/12/2022, 09/22/2023    Influenza - High Dose - PF (65 years and older) 10/09/2017, 10/11/2017, 10/27/2018, 09/09/2019    Influenza - Quadrivalent - PF *Preferred* (6 months and older) 11/03/2006, 10/08/2007, 09/29/2009, 09/26/2014, 09/26/2014, 11/02/2015, 11/02/2015, 09/27/2016    Influenza - Trivalent (ADULT) 11/03/2006, 10/08/2007, 09/29/2009, 09/26/2014    Influenza - Trivalent - PF (ADULT)  11/02/2015    Influenza Split 11/03/2006, 10/08/2007, 09/29/2009, 09/26/2014    Pneumococcal Conjugate - 13 Valent 01/16/2013, 01/16/2013    Pneumococcal Polysaccharide - 23 Valent 09/27/2016, 11/12/2021    RSVpreF (Arexvy) 10/27/2023    Tdap 02/06/2019    Zoster 01/16/2013    Zoster Recombinant 11/30/2018, 02/12/2019     Family History:    Family History   Problem Relation Name Age of Onset    Breast cancer Mother Tiki Singh     Cancer Mother Tiki Singh         Breast    Hypertension Father Madi     Diabetes Father Madi         Amputation of legs    Breast cancer Sister Brenda     Diabetes Sister Brenda     Arthritis Sister Brenda     Cancer Sister Brenda         Breast    Osteoarthritis Brother Luis     Diabetes Brother Luis     Arthritis Brother Luis     Birth defects Brother Luis         Polio at birth, recently had knee replacement surgery on left knee    No Known Problems Daughter      No Known Problems Daughter      No Known Problems Son      No Known Problems Son      Breast cancer Maternal Aunt Gege     Cancer Maternal Aunt Gege         Breast    Early death Sister Philomena     Melanoma Neg Hx      Colon cancer Neg Hx      Crohn's disease Neg Hx      Stomach cancer Neg Hx      Ulcerative colitis Neg Hx      Rectal cancer Neg Hx      Irritable bowel syndrome Neg Hx      Esophageal cancer Neg Hx      Celiac disease Neg Hx      Ovarian cancer Neg Hx      Liver cancer Neg Hx      Pancreatic cancer Neg Hx       Social History     Substance and Sexual Activity   Alcohol Use Not Currently    Comment: wine occasionally      Social History     Substance and Sexual Activity   Drug Use No      Social History     Socioeconomic History    Marital status:      Spouse name: Lewis    Number of children: 4   Occupational History    Occupation: -retired     Employer: [employer]     Comment: [employer]     Employer: RETIRED   Tobacco Use    Smoking status: Never     Passive exposure:  Never    Smokeless tobacco: Never   Substance and Sexual Activity    Alcohol use: Not Currently     Comment: wine occasionally    Drug use: No    Sexual activity: Yes     Partners: Male     Birth control/protection: Post-menopausal, None, See Surgical Hx     Comment: Hysterectomy   Other Topics Concern    Are you pregnant or think you may be? No    Breast-feeding No   Social History Narrative         Social Determinants of Health     Financial Resource Strain: Low Risk  (3/26/2024)    Overall Financial Resource Strain (CARDIA)     Difficulty of Paying Living Expenses: Not hard at all   Food Insecurity: No Food Insecurity (3/26/2024)    Hunger Vital Sign     Worried About Running Out of Food in the Last Year: Never true     Ran Out of Food in the Last Year: Never true   Transportation Needs: No Transportation Needs (3/26/2024)    PRAPARE - Transportation     Lack of Transportation (Medical): No     Lack of Transportation (Non-Medical): No   Physical Activity: Insufficiently Active (3/26/2024)    Exercise Vital Sign     Days of Exercise per Week: 1 day     Minutes of Exercise per Session: 10 min   Stress: No Stress Concern Present (3/26/2024)    [employer] - Occupational Stress Questionnaire     Feeling of Stress : Only a little   Housing Stability: Low Risk  (3/26/2024)    Housing Stability Vital Sign     Unable to Pay for Housing in the Last Year: No     Number of Places Lived in the Last Year: 1     Unstable Housing in the Last Year: No     Review of Systems   Constitutional:  Negative for chills, diaphoresis, fever, malaise/fatigue and weight loss.   HENT:  Negative for congestion, ear discharge, ear pain, hearing loss, nosebleeds, sinus pain, sore throat and tinnitus.    Eyes:  Negative for blurred vision, double vision, photophobia, pain, discharge and redness.   Respiratory:  Positive for shortness of breath (heavy exertion only). Negative for cough, hemoptysis, sputum  "production, wheezing and stridor.    Cardiovascular:  Negative for chest pain, palpitations, orthopnea, claudication, leg swelling and PND.   Gastrointestinal:  Positive for heartburn. Negative for abdominal pain, blood in stool, constipation, diarrhea, melena, nausea and vomiting.   Genitourinary:  Negative for dysuria, flank pain, frequency, hematuria and urgency.   Musculoskeletal:  Negative for back pain, falls, joint pain, myalgias and neck pain.   Skin:  Negative for itching and rash.   Neurological:  Negative for dizziness, tingling, tremors, sensory change, speech change, focal weakness, seizures, loss of consciousness, weakness and headaches.   Endo/Heme/Allergies:  Negative for environmental allergies and polydipsia. Does not bruise/bleed easily.   Psychiatric/Behavioral:  Negative for depression, hallucinations, memory loss, substance abuse and suicidal ideas. The patient is not nervous/anxious and does not have insomnia.      Vitals  Ht 5' 5" (1.651 m)   Wt 65.3 kg (144 lb)   BMI 23.96 kg/m²   Physical Exam  Vitals and nursing note reviewed.   Constitutional:       General: She is not in acute distress.     Appearance: She is well-developed. She is not diaphoretic.   HENT:      Head: Normocephalic and atraumatic.      Nose: Nose normal.      Mouth/Throat:      Pharynx: No oropharyngeal exudate.   Eyes:      General: No scleral icterus.     Conjunctiva/sclera: Conjunctivae normal.      Pupils: Pupils are equal, round, and reactive to light.   Neck:      Thyroid: No thyromegaly.      Vascular: No JVD.      Trachea: No tracheal deviation.   Cardiovascular:      Rate and Rhythm: Normal rate and regular rhythm.      Heart sounds: Normal heart sounds. No murmur heard.     No friction rub. No gallop.   Pulmonary:      Effort: Pulmonary effort is normal. No respiratory distress.      Breath sounds: Wheezing and rales present.      Comments: Diffuse expiratory squeaks  Abdominal:      General: Bowel sounds are " normal. There is no distension.      Palpations: Abdomen is soft.      Tenderness: There is no abdominal tenderness.   Musculoskeletal:         General: No deformity. Normal range of motion.      Cervical back: Normal range of motion and neck supple.   Skin:     General: Skin is warm and dry.      Capillary Refill: Capillary refill takes less than 2 seconds.      Coloration: Skin is not pale.      Findings: No erythema or rash.      Comments: Skin tightening.  Digital ulcers and nailbed deformities.  Bilateral foot ulcers in dry intact dressing     Neurological:      Mental Status: She is alert and oriented to person, place, and time.      Cranial Nerves: No cranial nerve deficit.   Psychiatric:         Judgment: Judgment normal.         Labs:          8/22/2024     2:13 PM 3/4/2024     1:35 PM 9/18/2023    11:06 AM 8/17/2023     1:27 PM 5/23/2023     9:20 AM 10/7/2022    12:56 PM 1/3/2022    10:43 AM   Pulmonary Function Tests   FVC 1.77 liters 1.81 liters 1.76 liters 1.79 liters 1.9 liters 1.82 liters 1.83 liters   FEV1 1.46 liters 1.43 liters 1.46 liters 1.42 liters 1.58 liters 1.49 liters 1.44 liters   TLC (liters) 2.99 liters 3.28 liters 2.7 liters 3.11 liters 3.41 liters 3.2 liters    DLCO (ml/mmHg sec) 10.7 ml/mmHg sec 10.62 ml/mmHg sec 9.15 ml/mmHg sec 10.8 ml/mmHg sec 11.37 ml/mmHg sec 11.89 ml/mmHg sec    FVC% 61.9 63 66.1 61.8 71.1 67 67   FEV1% 66.2 64 70.5 63.8 75.6 71 68   FEF 25-75 1.48 1.32 1.64 1.35 1.82 1.7    FEF 25-75% 58.4 72 93.5 73.2 103.5 95    TLC% 58.6 64 52.8 60.9 66.8 63    RV 1.18 1.33 0.94 1.32 1.46 1.33    RV% 55.2 62 44.2 62.2 68.5 63    DLCO% 49.5 49 42.1 49.7 52.3 54          8/22/2024    12:04 PM 4/29/2024     2:06 PM 3/4/2024    12:40 PM 9/18/2023    10:57 AM 8/17/2023     1:29 PM 5/22/2023     9:20 AM 2/28/2023    12:42 PM   6MW   6MWT Status completed without stopping completed without stopping completed without stopping completed without stopping completed without stopping  completed without stopping completed without stopping   Patient Reported Leg pain Dyspnea;Leg pain Other (Comment) Dyspnea Dyspnea No complaints Other (Comment)   Was O2 used? No No No No No No No   6MW Distance walked (feet) 1275 feet 1532 feet 1562 feet 1450 feet 1300 feet 1500 feet 1500 feet   Distance walked (meters) 388.62 meters 466.95 meters 476.1 meters 441.96 meters 396.24 meters 457.2 meters 457.2 meters   Did patient stop? No No No No No No No   Oxygen Saturation 97 % 99 % 100 % 100 % 98 % 100 % 98 %   Supplemental Oxygen Room Air Room Air Room Air Room Air Room Air Room Air Room Air   Heart Rate 68 bpm 77 bpm 77 bpm 75 bpm 70 bpm 62 bpm 74 bpm   Blood Pressure 125/63 133/66 134/88 152/67 141/63 109/51 133/63   Ju Dyspnea Rating  somewhat heavy moderate light light light moderate moderate   Oxygen Saturation 97 % 97 % 96 % 98 % 96 % 98 % 94 %   Supplemental Oxygen Room Air Room Air Room Air Room Air Room Air Room Air Room Air   Heart Rate 86 bpm 98 bpm 111 bpm 102 bpm 98 bpm 90 bpm 113 bpm   Blood Pressure 133/56 167/63 156/80 168/71 159/70 136/62 169/76   Ju Dyspnea Rating  moderate somewhat heavy somewhat heavy moderate somewhat heavy somewhat heavy somewhat heavy   Recovery Time (seconds) 65 seconds 157 seconds 121 seconds 123 seconds 100 seconds 131 seconds 160 seconds   Oxygen Saturation 99 % 100 % 100 % 100 % 100 % 100 % 97 %   Supplemental Oxygen Room Air  Room Air Room Air Room Air Room Air Room Air   Heart Rate 78 bpm 88 bpm 84 bpm 90 bpm 82 bpm 71 bpm 84 bpm       Imaging:  Results for orders placed during the hospital encounter of 12/22/20    CT Chest Without Contrast    Narrative  EXAMINATION:  CT CHEST WITHOUT CONTRAST    CLINICAL HISTORY:  pulmonary hypertension; Pulmonary hypertension, unspecified    TECHNIQUE:  Using low dose technique the chest was surveyed from above the pulmonary apices through the posterior costophrenic angles.  Data was reconstructed for multiplanar images in  axial, sagittal and coronal planes and for maximal intensity projection images in the axial plane.    All CT scans at this facility use dose modulation, iterative reconstruction and/or weight based dosing when appropriate to reduce radiation dose to as low as reasonably achievable.    Xray dose: DLP = 152.03 mGy-cm.    COMPARISON:  CT chest abdomen without contrast 03/13/2019.  CT chest without contrast 09/18/2015.    FINDINGS:  Base of Neck: No significant abnormality.    Aorta: Left-sided aortic arch with 3 arterial branches. The aorta maintains normal caliber, contour and course. There is mild calcification of the thoracic aorta or coronary arteries.    Heart/pericardium: Normal size.  No pericardial effusion or calcification.  Calcification of the aortic valve annulus.    Pulmonary vasculature: Pulmonary arteries distribute normally.  Pulmonary artery size is neither specific nor sensitive for normal or elevated pulmonary arterial pressures.    -There are four pulmonary veins.    Barbara/Mediastinum: No pathologic noelle enlargement.    Esophagus: The esophagus is mildly dilated with dependent fluid, similar to prior exam.    Upper Abdomen: No abnormality of the partially imaged upper abdomen.    Thoracic soft tissues: Normal. Both breasts are present.    Bones: No acute fracture. No suspicious lytic or sclerotic lesion.    Airways: Patent.    Lungs/pleura:    -Stable biapical scarring.    -Redemonstration of cystic changes and reticulation plus subsegmental ground-glass disease, most extensive in the lower lung zones.  Radiographic appearance is more typical for NSIP but could represent UIP in this patient with scleroderma.    -There are stable scattered foci of tree-in-bud nodularities, for example within the superior segment of the right lower lobe (axial series 4, image 211).  There is associated dilatation of multiple adjacent small airways.    -0.4 cm solid pulmonary nodule in the apical segment of the right  upper lobe (axial series 4, image 83), stable dating back to 2015.    -No pleural fluid or thickening.No pleural calcification. No pneumothorax.    Impression  1.  Overall stable appearing chronic interstitial lung disease consistent with patient's history of scleroderma.  Radiographic appearance is more typical for NSIP and UIP, noting that UIP is a more common disease.    2.  Previously characterized tree-in-bud opacity along the superior aspect of the right oblique fissure appears stable and may reflect chronic inflammatory process.    3.  0.4 cm solid right upper lobe pulmonary nodule, stable dating back to 09/18/2015.  Such stability favors benign etiology.    4.  Persistent fluid-filled esophagus as noted on multiple priors placing the patient at increased risk for aspiration.  This may be related to the patient's history of scleroderma; clinical considerations will determine if further assessment of esophageal motility is warranted.    Electronically signed by resident: Leobardo Arellano  Date:    12/22/2020  Time:    11:43    Electronically signed by: Maxine Del Cid MD  Date:    12/22/2020  Time:    14:23    Reading Physician Reading Date Result Priority   Bg Ferreira MD  [phone] 9/8/2023 Routine   Mikala Krues MD  [phone] 9/8/2023      Narrative & Impression  EXAMINATION:  CT CHEST WITHOUT CONTRAST     CLINICAL HISTORY:  Lung nodule, 6-8mm;3 month follow up solitary pulmonary nodule; Abnormal findings on diagnostic imaging of other specified body structures     TECHNIQUE:  Low dose axial images, sagittal and coronal reformations were obtained from the thoracic inlet to the lung bases. Contrast was not administered.     All CT scans at this facility use dose modulation, iterative reconstruction and/or weight based dosing when appropriate to reduce rad iation dose to as low as reasonably achievable.     COMPARISON:  12/22/2020, 06/02/2023     FINDINGS:  No intravenous contrast  was administered for this examination.  Therefore, it may have diminished sensitivity for detection of certain abnormalities.     Thyroid gland: Within normal limits.     Thoracic soft tissues: Unremarkable.     Mediastinum: No pathologically enlarged lymph nodes.     Cardiovascular: Normal heart size.No pericardial effusion.     Trachea: Within normal limits.     Lungs/pleura/airways:     Redemonstration of basilar and subpleural predominant reticulation and ground-glass with architectural distortion, volume loss, and traction bronchiectasis/bronchiolectasis.  Worsening of patchy areas of ground-glass bilaterally.  There is a straight edge sign present with sharply marginated basilar pseudo honeycombing likely representing clustered dilated bronchiectasis and cystic bronchiectasis.  Anterior upper lobe sign also noted.  These findings are grossly stable compared to CT 12/22/2020.     0.6 cm solid pulmonary nodule in the left upper lobe (4-193), 0.4 cm solid pulmonary nodule left upper lobe (4-212), right upper lobe 0.4 cm solid pulmonary nodule (4-89).  These nodules are unchanged from CT 06/02/2023, but new from CT 12/22/2020..     Esophagus: Esophagus is dilated and fluid-filled to the level of the aortic arch compatible with achalasia in the setting of scleroderma..     Upper abdomen:     Partially imaged.  No significant abnormalities.     Bones: Unremarkable for stated age.     Impression:     Redemonstration of pulmonary fibrosis with mild increased bilateral patchy areas of patchy ground-glass.  Findings favored represent as NSIP patient with history of scleroderma.     Solid pulmonary nodules measuring up to 0.6 cm as detailed above which are stable from CT 06/02/2023, but new from 12/22/2020.  This can be reassessed in the next follow-up     Stable patulous esophagus.     Electronically signed by resident: Mikala Kruse  Date:                                            09/08/2023  Time:                                            15:29     Electronically signed by:Bg Granado  Date:                                            09/08/2023  Time:                                           17:14    Cardiodiagnostics:  Results for orders placed during the hospital encounter of 12/29/21    Echo    Interpretation Summary  · The left ventricle is normal in size with normal systolic function.  · The estimated ejection fraction is 63%.  · Normal left ventricular diastolic function.  · Normal right ventricular size with normal right ventricular systolic function.  · Mild pulmonic regurgitation.  · Normal central venous pressure (3 mmHg).  · The estimated PA systolic pressure is 32 mmHg.  · Trivial posterior pericardial effusion.    Results for orders placed during the hospital encounter of 08/17/23    Echo    Interpretation Summary    Left Ventricle: The left ventricle is normal in size. Normal wall thickness. Normal wall motion. There is normal systolic function with a visually estimated ejection fraction of 60 - 65%. There is normal diastolic function.    Right Ventricle: Normal right ventricular cavity size. Wall thickness is normal. Right ventricle wall motion  is normal. Systolic function is normal.    Pulmonary Artery: The estimated pulmonary artery systolic pressure is 40 mmHg.      Assessment:  1. Scleroderma with pulmonary involvement    2. Gastroesophageal reflux disease, unspecified whether esophagitis present    3. WHO group 1 pulmonary arterial hypertension            Plan:     Followed for SSc-ILD with PAH. On MMF and OFEV. Previous FVC/DLCO was stable for the last two years. No testing for review today. Has known PH which is stable on Adcirca.     Continue to follow with GI for GERD/scleroderma esophagus. Encouraged continued sleeping on incline. Continue with PPI. Adequately controlled. Has follow up EGD/colonoscopy in 2025/2026.     Continue Adcirca and PH follow-up. Lasix increased per PH team for  lower extremity edema since early August. Scheduled for repeat RHC at the end of the year.     Unable to open CT chest from OSH. No previous consolidation/mass seen in SHAUNA on prior CT. Had a stable 6mm nodule of SHAUNA new in 6/2023 (from 2020) and stable on repeat CT chest 9/2023. Will repeat CT chest today.     5. RTC TBD after CT chest.       Claire Pierre PA-C  [facility]

## 2024-09-13 NOTE — H&P (VIEW-ONLY)
ADVANCED LUNG DISEASE CLINIC FOLLOW-UP                                                                                                                                             Reason for Visit:  SSc-ILD    Referring Physician: No ref. provider found    History of Present Illness: Yamel Shah is a 73 y.o. female who is on 0L of oxygen.  She is on no assisted ventilation.  Her New York Heart Association Class is II and a Karnofsky score of 90% - Able to carry on normal activity: minor symptoms of disease. She is not diabetic.    Patient presents today for routine follow up. Recently hospitalized for episodes of stanislav hemoptysis when visiting family in [place] (9/6). Remains on MMF, lasix, and OFEV. States her reflux is well controlled most of the time. Reports progression of her dyspnea since earlier this year. Denies consitutional symptoms. Her initial episodes of hemoptysis resolved until yesterday when she had an episode of bloody sputum.     Past Medical History:   Diagnosis Date    Abnormal Pap smear     Acid reflux     Allergy     Arthritis     Encounter for blood transfusion     GERD (gastroesophageal reflux disease)     Hiatal hernia 03/24/2023    History of migraine headaches     Hx of colonic polyp     Hypertension     Idiopathic neuropathy 07/20/2012    ILD (interstitial lung disease) 11/06/2013    Iron deficiency anemia 03/18/2014    MRSA carrier     Osteopenia     Pneumonia     Pulmonary fibrosis     Pulmonary hypertension     Raynaud's disease     Scleroderma, diffuse     Sjogren's syndrome     Vitamin D deficiency 11/14/2013       Past Surgical History:   Procedure Laterality Date    24 HOUR IMPEDANCE PH MONITORING OF ESOPHAGUS IN PATIENT NOT TAKING ACID REDUCING MEDICATIONS N/A 03/04/2020    Procedure: IMPEDANCE PH STUDY, ESOPHAGEAL, 24 HOUR, IN PATIENT NOT TAKING ACID REDUCING MEDICATION;  Surgeon: Annamaria Mendoza MD;  Location: [facility] ([address] [place] [address]);  Service: Endoscopy;  Laterality:  N/A;  OFF PPI/H2 Blocker   Motility Studies   Hold Narcotics x 1 days   Hold TCA x 1 days  2/26 - LVM attempting to confirm appt  2/27 - Confirmed appt    ANORECTAL MANOMETRY N/A 05/10/2021    Procedure: MANOMETRY, ANORECTAL with balloon expulsion test;  Surgeon: Annamaria Mendoza MD;  Location: [facility] (4TH FLR);  Service: Endoscopy;  Laterality: N/A;  order combined  covid test 5/7 [facility], instructions emailed-KPvt    BREAST BIOPSY      Left, benign    BRONCHOSCOPY N/A 10/2/2023    Procedure: bronch;  Surgeon: Roberta Leigh DO;  Location: [facility] OR 2ND FLR;  Service: Endoscopy;  Laterality: N/A;    CERVICAL CONIZATION   W/ LASER  1970    COLONOSCOPY      COLONOSCOPY N/A 03/29/2019    Procedure: COLONOSCOPY;  Surgeon: Annamaria Mendoza MD;  Location: [facility] (2ND FLR);  Service: Endoscopy;  Laterality: N/A;    COLONOSCOPY N/A 05/10/2021    Procedure: COLONOSCOPY;  Surgeon: Annamaria Mendoza MD;  Location: [facility] (4TH FLR);  Service: Endoscopy;  Laterality: N/A;  2nd floor-previous scopes done on 2nd floor, gastroparesis  full liquid diet x2 days, clear liquid x1 day prior to procedure  covid test 5/7 [facility], instructions emailed-KPvt  5/6 pt confirmed appt-KPvt    DILATION AND CURETTAGE OF UTERUS      ESOPHAGEAL MANOMETRY WITH MEASUREMENT OF IMPEDANCE N/A 03/04/2020    Procedure: MANOMETRY, ESOPHAGUS, WITH IMPEDANCE MEASUREMENT;  Surgeon: Annamaria Mendoza MD;  Location: [facility] (4TH FLR);  Service: Endoscopy;  Laterality: N/A;  OFF PPI/H2 Blocker   Motility Studies   Hold Narcotics x 1 days   Hold TCA x 1 days    ESOPHAGOGASTRODUODENOSCOPY      ESOPHAGOGASTRODUODENOSCOPY N/A 03/29/2019    Procedure: EGD (ESOPHAGOGASTRODUODENOSCOPY);  Surgeon: Annamaria Mendoza MD;  Location: [facility] ENDO (2ND FLR);  Service: Endoscopy;  Laterality: N/A;  pulmonary htn    ESOPHAGOGASTRODUODENOSCOPY N/A 02/02/2021    Procedure: ESOPHAGOGASTRODUODENOSCOPY (EGD);  Surgeon: Annamaria Mendoza MD;  Location: [facility] (2ND FLR);   Service: Endoscopy;  Laterality: N/A;  2nd floor gastroparesis  3 days full liquid diet and 1 day clears  covid test 1/30 primary care, instructions sent to [facility]    ESOPHAGOGASTRODUODENOSCOPY N/A 07/01/2022    Procedure: ESOPHAGOGASTRODUODENOSCOPY (EGD);  Surgeon: Annamaria Mendoza MD;  Location: [address]);  Service: Endoscopy;  Laterality: N/A;  2nd floor-gastroparesis  full liquid diet x3 days, clear liquid diet x1 day prior to procedure  fully vaccinated, instructions sent to [facility]  6/29-pt confirmed new arrival time-[facility]    EXCISION, LESION, STOMACH, LAPAROSCOPIC N/A 03/23/2023    Procedure: XI ROBOTIC EXCISION, LESION, STOMACH;  Surgeon: Katherine Segura MD;  Location: [address];  Service: General;  Laterality: N/A;    EXCISIONAL BIOPSY, LYMPH NODE Right 05/26/2023    Procedure: EXCISIONAL BIOPSY, LYMPH NODE, INGUINAL;  Surgeon: Katherine Segura MD;  Location: [address];  Service: General;  Laterality: Right;    HYSTERECTOMY  1990    FRANDY (AUB, Fibroids), ovaries remain    REPAIR, HERNIA, UMBILICAL N/A 03/23/2023    Procedure: REPAIR, HERNIA, UMBILICAL;  Surgeon: Katherine Segura MD;  Location: [address];  Service: General;  Laterality: N/A;    RIGHT HEART CATHETERIZATION Right 01/05/2021    Procedure: INSERTION, CATHETER, RIGHT HEART;  Surgeon: Aureliano Sy MD;  Location: [facility] CATH LAB;  Service: Cardiology;  Laterality: Right;    RIGHT HEART CATHETERIZATION Right 03/16/2023    Procedure: INSERTION, CATHETER, RIGHT HEART;  Surgeon: Aureliano Sy MD;  Location: [facility] CATH LAB;  Service: Cardiology;  Laterality: Right;    ROBOT-ASSISTED LAPAROSCOPIC REPAIR OF INGUINAL HERNIA USING DA VERNELL XI Right 05/26/2023    Procedure: XI ROBOTIC REPAIR, HERNIA, INGUINAL, FEMORAL;  Surgeon: Katherine Segura MD;  Location: [address];  Service: General;  Laterality: Right;    ROBOT-ASSISTED REPAIR OF HIATAL HERNIA USING DA  VERNELL XI N/A 03/23/2023    Procedure: XI ROBOTIC REPAIR, HERNIA, HIATAL;  Surgeon: Katherine Segura MD;  Location: [facility] OR 2ND FLR;  Service: General;  Laterality: N/A;    VARICOSE VEIN SURGERY      XI ROBOTIC GASTROPEXY N/A 03/23/2023    Procedure: XI ROBOTIC GASTROPEXY;  Surgeon: Katherine Segura MD;  Location: [facility] OR 2ND FLR;  Service: General;  Laterality: N/A;    XI ROBOTIC PYLOROMYOTOMY N/A 03/23/2023    Procedure: XI ROBOTIC PYLOROMYOTOMY;  Surgeon: Katherine Segura MD;  Location: [facility] OR 2ND FLR;  Service: General;  Laterality: N/A;       Allergies: Spironolactone, Sulfa (sulfonamide antibiotics), and Tramadol    Current Outpatient Medications   Medication Sig    acetaminophen-codeine 300-60mg (TYLENOL #4) 300-60 mg Tab Take 1 tablet by mouth every evening.    albuterol (PROVENTIL) 2.5 mg /3 mL (0.083 %) nebulizer solution Take 3 mLs (2.5 mg total) by nebulization every 4 (four) hours as needed for Wheezing or Shortness of Breath (or cough). Rescue    albuterol (VENTOLIN HFA) 90 mcg/actuation inhaler Inhale 2 puffs into the lungs every 6 (six) hours as needed for Wheezing. Rescue    albuterol sulfate 2.5 mg/0.5 mL Nebu Take 2.5 mg by nebulization every 4 (four) hours as needed (shortness of breath/wheezing). Rescue    ergocalciferol (ERGOCALCIFEROL) 50,000 unit Cap TAKE 1 CAPSULE BY MOUTH EVERY 7 DAYS    furosemide (LASIX) 20 MG tablet Take 1 tablet (20 mg total) by mouth once daily.    multivitamin capsule Take 1 capsule by mouth once daily.    mycophenolate mofetil (CELLCEPT) 200 mg/mL SusR Take 5 mLs (1,000 mg total) by mouth 2 (two) times daily.    nabumetone (RELAFEN) 750 MG tablet Take 1 tablet (750 mg total) by mouth daily as needed for Pain.    nebulizer and compressor Rosemary Use as directed    NIFEdipine (ADALAT CC) 90 MG TbSR TAKE 1 TABLET BY MOUTH EVERY EVENING WITH 30 MG FOR A TOTAL OF 120MG    NIFEdipine (PROCARDIA-XL) 30 MG (OSM) 24 hr tablet TAKE 1 TABLET(30 MG) BY  MOUTH EVERY DAY    nintedanib (OFEV) 100 mg Cap Take 100 mg by mouth 2 (two) times a day.    pravastatin (PRAVACHOL) 20 MG tablet TAKE 1 TABLET(20 MG) BY MOUTH EVERY EVENING    pregabalin (LYRICA) 300 MG Cap Take 1 capsule (300 mg total) by mouth 2 (two) times daily.    PREVIDENT 5000 BOOSTER PLUS 1.1 % Pste SMARTSIG:To Teeth    PREVIDENT 5000 SENSITIVE 1.1-5 % Pste BRUSH FOR 2 MINUTES TWICE PER DAY    RABEprazole (ACIPHEX) 20 mg tablet Take 1 tablet (20 mg total) by mouth 2 (two) times daily.    tadalafil (ADCIRCA) 20 mg Tab Take 2 tablets (40 mg total) by mouth once daily.    tiZANidine (ZANAFLEX) 4 MG tablet Take 2 tablets (8 mg total) by mouth 2 (two) times daily as needed (muscle pain).     No current facility-administered medications for this visit.     Facility-Administered Medications Ordered in Other Visits   Medication    fentaNYL 50 mcg/mL injection 25 mcg    haloperidol lactate injection 0.5 mg    sodium chloride 0.9% flush 10 mL       Immunization History   Administered Date(s) Administered    COVID-19 MRNA, LN-S PF (MODERNA HALF 0.25 ML DOSE) 04/21/2022    COVID-19, MRNA, LN-S, PF (MODERNA FULL 0.5 ML DOSE) 02/09/2021, 03/12/2021, 09/21/2021    COVID-19, MRNA, LN-S, PF (Pfizer) (Purple Cap) 10/13/2022    COVID-19, mRNA, LNP-S, PF (Moderna 2023)Ages 12+ 10/18/2023, 05/02/2024    COVID-19, mRNA, LNP-S, bivalent booster, PF (Moderna Omicron)12 + YEARS 10/13/2022, 10/13/2022    Influenza 11/02/2015, 09/21/2021, 09/22/2023    Influenza (FLUAD) - Quadrivalent - Adjuvanted - PF *Preferred* (65+) 09/11/2020, 09/12/2022, 09/22/2023    Influenza - High Dose - PF (65 years and older) 10/09/2017, 10/11/2017, 10/27/2018, 09/09/2019    Influenza - Quadrivalent - PF *Preferred* (6 months and older) 11/03/2006, 10/08/2007, 09/29/2009, 09/26/2014, 09/26/2014, 11/02/2015, 11/02/2015, 09/27/2016    Influenza - Trivalent (ADULT) 11/03/2006, 10/08/2007, 09/29/2009, 09/26/2014    Influenza - Trivalent - PF (ADULT)  11/02/2015    Influenza Split 11/03/2006, 10/08/2007, 09/29/2009, 09/26/2014    Pneumococcal Conjugate - 13 Valent 01/16/2013, 01/16/2013    Pneumococcal Polysaccharide - 23 Valent 09/27/2016, 11/12/2021    RSVpreF (Arexvy) 10/27/2023    Tdap 02/06/2019    Zoster 01/16/2013    Zoster Recombinant 11/30/2018, 02/12/2019     Family History:    Family History   Problem Relation Name Age of Onset    Breast cancer Mother Tiki Singh     Cancer Mother Tiki Singh         Breast    Hypertension Father Madi     Diabetes Father Madi         Amputation of legs    Breast cancer Sister Brenda     Diabetes Sister Brenda     Arthritis Sister Brenda     Cancer Sister Brenda         Breast    Osteoarthritis Brother Luis     Diabetes Brother Luis     Arthritis Brother Luis     Birth defects Brother Luis         Polio at birth, recently had knee replacement surgery on left knee    No Known Problems Daughter      No Known Problems Daughter      No Known Problems Son      No Known Problems Son      Breast cancer Maternal Aunt Gege     Cancer Maternal Aunt Gege         Breast    Early death Sister Philomena     Melanoma Neg Hx      Colon cancer Neg Hx      Crohn's disease Neg Hx      Stomach cancer Neg Hx      Ulcerative colitis Neg Hx      Rectal cancer Neg Hx      Irritable bowel syndrome Neg Hx      Esophageal cancer Neg Hx      Celiac disease Neg Hx      Ovarian cancer Neg Hx      Liver cancer Neg Hx      Pancreatic cancer Neg Hx       Social History     Substance and Sexual Activity   Alcohol Use Not Currently    Comment: wine occasionally      Social History     Substance and Sexual Activity   Drug Use No      Social History     Socioeconomic History    Marital status:      Spouse name: Lewis    Number of children: 4   Occupational History    Occupation: -retired     Employer: [employer]     Comment: [employer]     Employer: RETIRED   Tobacco Use    Smoking status: Never     Passive exposure:  Never    Smokeless tobacco: Never   Substance and Sexual Activity    Alcohol use: Not Currently     Comment: wine occasionally    Drug use: No    Sexual activity: Yes     Partners: Male     Birth control/protection: Post-menopausal, None, See Surgical Hx     Comment: Hysterectomy   Other Topics Concern    Are you pregnant or think you may be? No    Breast-feeding No   Social History Narrative         Social Determinants of Health     Financial Resource Strain: Low Risk  (3/26/2024)    Overall Financial Resource Strain (CARDIA)     Difficulty of Paying Living Expenses: Not hard at all   Food Insecurity: No Food Insecurity (3/26/2024)    Hunger Vital Sign     Worried About Running Out of Food in the Last Year: Never true     Ran Out of Food in the Last Year: Never true   Transportation Needs: No Transportation Needs (3/26/2024)    PRAPARE - Transportation     Lack of Transportation (Medical): No     Lack of Transportation (Non-Medical): No   Physical Activity: Insufficiently Active (3/26/2024)    Exercise Vital Sign     Days of Exercise per Week: 1 day     Minutes of Exercise per Session: 10 min   Stress: No Stress Concern Present (3/26/2024)    [employer] - Occupational Stress Questionnaire     Feeling of Stress : Only a little   Housing Stability: Low Risk  (3/26/2024)    Housing Stability Vital Sign     Unable to Pay for Housing in the Last Year: No     Number of Places Lived in the Last Year: 1     Unstable Housing in the Last Year: No     Review of Systems   Constitutional:  Negative for chills, diaphoresis, fever, malaise/fatigue and weight loss.   HENT:  Negative for congestion, ear discharge, ear pain, hearing loss, nosebleeds, sinus pain, sore throat and tinnitus.    Eyes:  Negative for blurred vision, double vision, photophobia, pain, discharge and redness.   Respiratory:  Positive for shortness of breath (heavy exertion only). Negative for cough, hemoptysis, sputum  "production, wheezing and stridor.    Cardiovascular:  Negative for chest pain, palpitations, orthopnea, claudication, leg swelling and PND.   Gastrointestinal:  Positive for heartburn. Negative for abdominal pain, blood in stool, constipation, diarrhea, melena, nausea and vomiting.   Genitourinary:  Negative for dysuria, flank pain, frequency, hematuria and urgency.   Musculoskeletal:  Negative for back pain, falls, joint pain, myalgias and neck pain.   Skin:  Negative for itching and rash.   Neurological:  Negative for dizziness, tingling, tremors, sensory change, speech change, focal weakness, seizures, loss of consciousness, weakness and headaches.   Endo/Heme/Allergies:  Negative for environmental allergies and polydipsia. Does not bruise/bleed easily.   Psychiatric/Behavioral:  Negative for depression, hallucinations, memory loss, substance abuse and suicidal ideas. The patient is not nervous/anxious and does not have insomnia.      Vitals  Ht 5' 5" (1.651 m)   Wt 65.3 kg (144 lb)   BMI 23.96 kg/m²   Physical Exam  Vitals and nursing note reviewed.   Constitutional:       General: She is not in acute distress.     Appearance: She is well-developed. She is not diaphoretic.   HENT:      Head: Normocephalic and atraumatic.      Nose: Nose normal.      Mouth/Throat:      Pharynx: No oropharyngeal exudate.   Eyes:      General: No scleral icterus.     Conjunctiva/sclera: Conjunctivae normal.      Pupils: Pupils are equal, round, and reactive to light.   Neck:      Thyroid: No thyromegaly.      Vascular: No JVD.      Trachea: No tracheal deviation.   Cardiovascular:      Rate and Rhythm: Normal rate and regular rhythm.      Heart sounds: Normal heart sounds. No murmur heard.     No friction rub. No gallop.   Pulmonary:      Effort: Pulmonary effort is normal. No respiratory distress.      Breath sounds: Wheezing and rales present.      Comments: Diffuse expiratory squeaks  Abdominal:      General: Bowel sounds are " normal. There is no distension.      Palpations: Abdomen is soft.      Tenderness: There is no abdominal tenderness.   Musculoskeletal:         General: No deformity. Normal range of motion.      Cervical back: Normal range of motion and neck supple.   Skin:     General: Skin is warm and dry.      Capillary Refill: Capillary refill takes less than 2 seconds.      Coloration: Skin is not pale.      Findings: No erythema or rash.      Comments: Skin tightening.  Digital ulcers and nailbed deformities.  Bilateral foot ulcers in dry intact dressing     Neurological:      Mental Status: She is alert and oriented to person, place, and time.      Cranial Nerves: No cranial nerve deficit.   Psychiatric:         Judgment: Judgment normal.         Labs:          8/22/2024     2:13 PM 3/4/2024     1:35 PM 9/18/2023    11:06 AM 8/17/2023     1:27 PM 5/23/2023     9:20 AM 10/7/2022    12:56 PM 1/3/2022    10:43 AM   Pulmonary Function Tests   FVC 1.77 liters 1.81 liters 1.76 liters 1.79 liters 1.9 liters 1.82 liters 1.83 liters   FEV1 1.46 liters 1.43 liters 1.46 liters 1.42 liters 1.58 liters 1.49 liters 1.44 liters   TLC (liters) 2.99 liters 3.28 liters 2.7 liters 3.11 liters 3.41 liters 3.2 liters    DLCO (ml/mmHg sec) 10.7 ml/mmHg sec 10.62 ml/mmHg sec 9.15 ml/mmHg sec 10.8 ml/mmHg sec 11.37 ml/mmHg sec 11.89 ml/mmHg sec    FVC% 61.9 63 66.1 61.8 71.1 67 67   FEV1% 66.2 64 70.5 63.8 75.6 71 68   FEF 25-75 1.48 1.32 1.64 1.35 1.82 1.7    FEF 25-75% 58.4 72 93.5 73.2 103.5 95    TLC% 58.6 64 52.8 60.9 66.8 63    RV 1.18 1.33 0.94 1.32 1.46 1.33    RV% 55.2 62 44.2 62.2 68.5 63    DLCO% 49.5 49 42.1 49.7 52.3 54          8/22/2024    12:04 PM 4/29/2024     2:06 PM 3/4/2024    12:40 PM 9/18/2023    10:57 AM 8/17/2023     1:29 PM 5/22/2023     9:20 AM 2/28/2023    12:42 PM   6MW   6MWT Status completed without stopping completed without stopping completed without stopping completed without stopping completed without stopping  completed without stopping completed without stopping   Patient Reported Leg pain Dyspnea;Leg pain Other (Comment) Dyspnea Dyspnea No complaints Other (Comment)   Was O2 used? No No No No No No No   6MW Distance walked (feet) 1275 feet 1532 feet 1562 feet 1450 feet 1300 feet 1500 feet 1500 feet   Distance walked (meters) 388.62 meters 466.95 meters 476.1 meters 441.96 meters 396.24 meters 457.2 meters 457.2 meters   Did patient stop? No No No No No No No   Oxygen Saturation 97 % 99 % 100 % 100 % 98 % 100 % 98 %   Supplemental Oxygen Room Air Room Air Room Air Room Air Room Air Room Air Room Air   Heart Rate 68 bpm 77 bpm 77 bpm 75 bpm 70 bpm 62 bpm 74 bpm   Blood Pressure 125/63 133/66 134/88 152/67 141/63 109/51 133/63   Ju Dyspnea Rating  somewhat heavy moderate light light light moderate moderate   Oxygen Saturation 97 % 97 % 96 % 98 % 96 % 98 % 94 %   Supplemental Oxygen Room Air Room Air Room Air Room Air Room Air Room Air Room Air   Heart Rate 86 bpm 98 bpm 111 bpm 102 bpm 98 bpm 90 bpm 113 bpm   Blood Pressure 133/56 167/63 156/80 168/71 159/70 136/62 169/76   Ju Dyspnea Rating  moderate somewhat heavy somewhat heavy moderate somewhat heavy somewhat heavy somewhat heavy   Recovery Time (seconds) 65 seconds 157 seconds 121 seconds 123 seconds 100 seconds 131 seconds 160 seconds   Oxygen Saturation 99 % 100 % 100 % 100 % 100 % 100 % 97 %   Supplemental Oxygen Room Air  Room Air Room Air Room Air Room Air Room Air   Heart Rate 78 bpm 88 bpm 84 bpm 90 bpm 82 bpm 71 bpm 84 bpm       Imaging:  Results for orders placed during the hospital encounter of 12/22/20    CT Chest Without Contrast    Narrative  EXAMINATION:  CT CHEST WITHOUT CONTRAST    CLINICAL HISTORY:  pulmonary hypertension; Pulmonary hypertension, unspecified    TECHNIQUE:  Using low dose technique the chest was surveyed from above the pulmonary apices through the posterior costophrenic angles.  Data was reconstructed for multiplanar images in  axial, sagittal and coronal planes and for maximal intensity projection images in the axial plane.    All CT scans at this facility use dose modulation, iterative reconstruction and/or weight based dosing when appropriate to reduce radiation dose to as low as reasonably achievable.    Xray dose: DLP = 152.03 mGy-cm.    COMPARISON:  CT chest abdomen without contrast 03/13/2019.  CT chest without contrast 09/18/2015.    FINDINGS:  Base of Neck: No significant abnormality.    Aorta: Left-sided aortic arch with 3 arterial branches. The aorta maintains normal caliber, contour and course. There is mild calcification of the thoracic aorta or coronary arteries.    Heart/pericardium: Normal size.  No pericardial effusion or calcification.  Calcification of the aortic valve annulus.    Pulmonary vasculature: Pulmonary arteries distribute normally.  Pulmonary artery size is neither specific nor sensitive for normal or elevated pulmonary arterial pressures.    -There are four pulmonary veins.    Barbara/Mediastinum: No pathologic noelle enlargement.    Esophagus: The esophagus is mildly dilated with dependent fluid, similar to prior exam.    Upper Abdomen: No abnormality of the partially imaged upper abdomen.    Thoracic soft tissues: Normal. Both breasts are present.    Bones: No acute fracture. No suspicious lytic or sclerotic lesion.    Airways: Patent.    Lungs/pleura:    -Stable biapical scarring.    -Redemonstration of cystic changes and reticulation plus subsegmental ground-glass disease, most extensive in the lower lung zones.  Radiographic appearance is more typical for NSIP but could represent UIP in this patient with scleroderma.    -There are stable scattered foci of tree-in-bud nodularities, for example within the superior segment of the right lower lobe (axial series 4, image 211).  There is associated dilatation of multiple adjacent small airways.    -0.4 cm solid pulmonary nodule in the apical segment of the right  upper lobe (axial series 4, image 83), stable dating back to 2015.    -No pleural fluid or thickening.No pleural calcification. No pneumothorax.    Impression  1.  Overall stable appearing chronic interstitial lung disease consistent with patient's history of scleroderma.  Radiographic appearance is more typical for NSIP and UIP, noting that UIP is a more common disease.    2.  Previously characterized tree-in-bud opacity along the superior aspect of the right oblique fissure appears stable and may reflect chronic inflammatory process.    3.  0.4 cm solid right upper lobe pulmonary nodule, stable dating back to 09/18/2015.  Such stability favors benign etiology.    4.  Persistent fluid-filled esophagus as noted on multiple priors placing the patient at increased risk for aspiration.  This may be related to the patient's history of scleroderma; clinical considerations will determine if further assessment of esophageal motility is warranted.    Electronically signed by resident: Leobardo Arellano  Date:    12/22/2020  Time:    11:43    Electronically signed by: Maxine Del Cid MD  Date:    12/22/2020  Time:    14:23    Reading Physician Reading Date Result Priority   Bg Ferreira MD  [phone] 9/8/2023 Routine   Mikala Kruse MD  [phone] 9/8/2023      Narrative & Impression  EXAMINATION:  CT CHEST WITHOUT CONTRAST     CLINICAL HISTORY:  Lung nodule, 6-8mm;3 month follow up solitary pulmonary nodule; Abnormal findings on diagnostic imaging of other specified body structures     TECHNIQUE:  Low dose axial images, sagittal and coronal reformations were obtained from the thoracic inlet to the lung bases. Contrast was not administered.     All CT scans at this facility use dose modulation, iterative reconstruction and/or weight based dosing when appropriate to reduce rad iation dose to as low as reasonably achievable.     COMPARISON:  12/22/2020, 06/02/2023     FINDINGS:  No intravenous contrast  was administered for this examination.  Therefore, it may have diminished sensitivity for detection of certain abnormalities.     Thyroid gland: Within normal limits.     Thoracic soft tissues: Unremarkable.     Mediastinum: No pathologically enlarged lymph nodes.     Cardiovascular: Normal heart size.No pericardial effusion.     Trachea: Within normal limits.     Lungs/pleura/airways:     Redemonstration of basilar and subpleural predominant reticulation and ground-glass with architectural distortion, volume loss, and traction bronchiectasis/bronchiolectasis.  Worsening of patchy areas of ground-glass bilaterally.  There is a straight edge sign present with sharply marginated basilar pseudo honeycombing likely representing clustered dilated bronchiectasis and cystic bronchiectasis.  Anterior upper lobe sign also noted.  These findings are grossly stable compared to CT 12/22/2020.     0.6 cm solid pulmonary nodule in the left upper lobe (4-193), 0.4 cm solid pulmonary nodule left upper lobe (4-212), right upper lobe 0.4 cm solid pulmonary nodule (4-89).  These nodules are unchanged from CT 06/02/2023, but new from CT 12/22/2020..     Esophagus: Esophagus is dilated and fluid-filled to the level of the aortic arch compatible with achalasia in the setting of scleroderma..     Upper abdomen:     Partially imaged.  No significant abnormalities.     Bones: Unremarkable for stated age.     Impression:     Redemonstration of pulmonary fibrosis with mild increased bilateral patchy areas of patchy ground-glass.  Findings favored represent as NSIP patient with history of scleroderma.     Solid pulmonary nodules measuring up to 0.6 cm as detailed above which are stable from CT 06/02/2023, but new from 12/22/2020.  This can be reassessed in the next follow-up     Stable patulous esophagus.     Electronically signed by resident: Mikala Kruse  Date:                                            09/08/2023  Time:                                            15:29     Electronically signed by:Bg Granado  Date:                                            09/08/2023  Time:                                           17:14    Cardiodiagnostics:  Results for orders placed during the hospital encounter of 12/29/21    Echo    Interpretation Summary  · The left ventricle is normal in size with normal systolic function.  · The estimated ejection fraction is 63%.  · Normal left ventricular diastolic function.  · Normal right ventricular size with normal right ventricular systolic function.  · Mild pulmonic regurgitation.  · Normal central venous pressure (3 mmHg).  · The estimated PA systolic pressure is 32 mmHg.  · Trivial posterior pericardial effusion.    Results for orders placed during the hospital encounter of 08/17/23    Echo    Interpretation Summary    Left Ventricle: The left ventricle is normal in size. Normal wall thickness. Normal wall motion. There is normal systolic function with a visually estimated ejection fraction of 60 - 65%. There is normal diastolic function.    Right Ventricle: Normal right ventricular cavity size. Wall thickness is normal. Right ventricle wall motion  is normal. Systolic function is normal.    Pulmonary Artery: The estimated pulmonary artery systolic pressure is 40 mmHg.      Assessment:  1. Scleroderma with pulmonary involvement    2. Gastroesophageal reflux disease, unspecified whether esophagitis present    3. WHO group 1 pulmonary arterial hypertension            Plan:     Followed for SSc-ILD with PAH. On MMF and OFEV. Previous FVC/DLCO was stable for the last two years. No testing for review today. Has known PH which is stable on Adcirca.     Continue to follow with GI for GERD/scleroderma esophagus. Encouraged continued sleeping on incline. Continue with PPI. Adequately controlled. Has follow up EGD/colonoscopy in 2025/2026.     Continue Adcirca and PH follow-up. Lasix increased per PH team for  lower extremity edema since early August. Scheduled for repeat RHC at the end of the year.     Unable to open CT chest from OSH. No previous consolidation/mass seen in SHAUNA on prior CT. Had a stable 6mm nodule of SHAUNA new in 6/2023 (from 2020) and stable on repeat CT chest 9/2023. Will repeat CT chest today.     5. RTC TBD after CT chest.       Claire Pierre PA-C  [facility]

## 2024-09-13 NOTE — TELEPHONE ENCOUNTER
Contacted patient regarding scheduled bronchoscopy procedure. She verbalized understanding of instructions and states she and her  will review written instructions sent via [facility] with a handout regarding bronchoscopy attached. All questions answered at this time. Confirmed that the patient is not taking aspirin nor blood thinners. All questions answered at this time.

## 2024-09-13 NOTE — TELEPHONE ENCOUNTER
Scheduling completed, 9/16 at 12:00 pm.  Instructions sent to patient via [facility] and explained over the phone.  ----- Message from Roberta Leigh DO sent at 9/13/2024 12:50 PM CDT -----  I would like to bronch her next week sometime.  She has a SHAUNA cavity which looks like an atypical infection such as a fungus.  She is an OR bronch.  Maybe Tuesday at 8am if that works?  Thanks

## 2024-09-16 ENCOUNTER — HOSPITAL ENCOUNTER (OUTPATIENT)
Facility: HOSPITAL | Age: 74
Discharge: HOME | End: 2024-09-16
Attending: INTERNAL MEDICINE | Admitting: INTERNAL MEDICINE
Payer: MEDICARE

## 2024-09-16 ENCOUNTER — ANESTHESIA EVENT (OUTPATIENT)
Dept: SURGERY | Facility: HOSPITAL | Age: 74
End: 2024-09-16
Payer: MEDICARE

## 2024-09-16 ENCOUNTER — ANESTHESIA (OUTPATIENT)
Dept: SURGERY | Facility: HOSPITAL | Age: 74
End: 2024-09-16
Payer: MEDICARE

## 2024-09-16 VITALS
OXYGEN SATURATION: 95 % | RESPIRATION RATE: 16 BRPM | SYSTOLIC BLOOD PRESSURE: 144 MMHG | TEMPERATURE: 98 F | HEIGHT: 65 IN | WEIGHT: 144 LBS | DIASTOLIC BLOOD PRESSURE: 65 MMHG | BODY MASS INDEX: 23.99 KG/M2 | HEART RATE: 60 BPM

## 2024-09-16 DIAGNOSIS — R93.89 ABNORMAL CHEST CT: ICD-10-CM

## 2024-09-16 LAB
APPEARANCE FLD: CLEAR
BODY FLD TYPE: ABNORMAL
COLOR FLD: COLORLESS
EOSINOPHIL NFR FLD MANUAL: 4 %
LYMPHOCYTES NFR FLD MANUAL: 9 %
MONOS+MACROS NFR FLD MANUAL: 66 %
NEUTROPHILS NFR FLD MANUAL: 19 %
OTHER CELLS FLD MANUAL: 2 %
WBC # FLD: 60 /CU MM

## 2024-09-16 PROCEDURE — 87798 DETECT AGENT NOS DNA AMP: CPT | Mod: 59,TXP | Performed by: INTERNAL MEDICINE

## 2024-09-16 PROCEDURE — 87015 SPECIMEN INFECT AGNT CONCNTJ: CPT | Mod: TXP | Performed by: INTERNAL MEDICINE

## 2024-09-16 PROCEDURE — 89051 BODY FLUID CELL COUNT: CPT | Mod: NTX | Performed by: INTERNAL MEDICINE

## 2024-09-16 PROCEDURE — 88305 TISSUE EXAM BY PATHOLOGIST: CPT | Mod: TXP | Performed by: STUDENT IN AN ORGANIZED HEALTH CARE EDUCATION/TRAINING PROGRAM

## 2024-09-16 PROCEDURE — 87651 STREP A DNA AMP PROBE: CPT | Mod: TXP | Performed by: INTERNAL MEDICINE

## 2024-09-16 PROCEDURE — 87799 DETECT AGENT NOS DNA QUANT: CPT | Mod: TXP | Performed by: INTERNAL MEDICINE

## 2024-09-16 PROCEDURE — 87305 ASPERGILLUS AG IA: CPT | Mod: 91,TXP | Performed by: INTERNAL MEDICINE

## 2024-09-16 PROCEDURE — 36000706: Mod: TXP | Performed by: INTERNAL MEDICINE

## 2024-09-16 PROCEDURE — 71000016 HC POSTOP RECOV ADDL HR: Mod: TXP | Performed by: INTERNAL MEDICINE

## 2024-09-16 PROCEDURE — 71000044 HC DOSC ROUTINE RECOVERY FIRST HOUR: Mod: TXP | Performed by: INTERNAL MEDICINE

## 2024-09-16 PROCEDURE — 87632 RESP VIRUS 6-11 TARGETS: CPT | Mod: NTX | Performed by: INTERNAL MEDICINE

## 2024-09-16 PROCEDURE — 87102 FUNGUS ISOLATION CULTURE: CPT | Mod: TXP | Performed by: INTERNAL MEDICINE

## 2024-09-16 PROCEDURE — 63600175 PHARM REV CODE 636 W HCPCS: Mod: TXP

## 2024-09-16 PROCEDURE — 87305 ASPERGILLUS AG IA: CPT | Mod: NTX | Performed by: INTERNAL MEDICINE

## 2024-09-16 PROCEDURE — 88341 IMHCHEM/IMCYTCHM EA ADD ANTB: CPT | Mod: TXP | Performed by: STUDENT IN AN ORGANIZED HEALTH CARE EDUCATION/TRAINING PROGRAM

## 2024-09-16 PROCEDURE — C1726 CATH, BAL DIL, NON-VASCULAR: HCPCS | Mod: NTX | Performed by: INTERNAL MEDICINE

## 2024-09-16 PROCEDURE — 88342 IMHCHEM/IMCYTCHM 1ST ANTB: CPT | Mod: TXP | Performed by: STUDENT IN AN ORGANIZED HEALTH CARE EDUCATION/TRAINING PROGRAM

## 2024-09-16 PROCEDURE — 37000008 HC ANESTHESIA 1ST 15 MINUTES: Mod: NTX | Performed by: INTERNAL MEDICINE

## 2024-09-16 PROCEDURE — 87070 CULTURE OTHR SPECIMN AEROBIC: CPT | Mod: TXP | Performed by: INTERNAL MEDICINE

## 2024-09-16 PROCEDURE — 88112 CYTOPATH CELL ENHANCE TECH: CPT | Mod: TXP | Performed by: STUDENT IN AN ORGANIZED HEALTH CARE EDUCATION/TRAINING PROGRAM

## 2024-09-16 PROCEDURE — 37000009 HC ANESTHESIA EA ADD 15 MINS: Mod: TXP | Performed by: INTERNAL MEDICINE

## 2024-09-16 PROCEDURE — 87798 DETECT AGENT NOS DNA AMP: CPT | Mod: TXP | Performed by: INTERNAL MEDICINE

## 2024-09-16 PROCEDURE — 87116 MYCOBACTERIA CULTURE: CPT | Mod: TXP | Performed by: INTERNAL MEDICINE

## 2024-09-16 PROCEDURE — 87641 MR-STAPH DNA AMP PROBE: CPT | Mod: NTX | Performed by: INTERNAL MEDICINE

## 2024-09-16 PROCEDURE — 87206 SMEAR FLUORESCENT/ACID STAI: CPT | Mod: TXP | Performed by: INTERNAL MEDICINE

## 2024-09-16 PROCEDURE — 87205 SMEAR GRAM STAIN: CPT | Mod: NTX | Performed by: INTERNAL MEDICINE

## 2024-09-16 PROCEDURE — 36000707: Mod: TXP | Performed by: INTERNAL MEDICINE

## 2024-09-16 PROCEDURE — 71000015 HC POSTOP RECOV 1ST HR: Mod: NTX | Performed by: INTERNAL MEDICINE

## 2024-09-16 PROCEDURE — 31624 DX BRONCHOSCOPE/LAVAGE: CPT | Mod: NTX,,, | Performed by: INTERNAL MEDICINE

## 2024-09-16 PROCEDURE — 87496 CYTOMEG DNA AMP PROBE: CPT | Mod: TXP | Performed by: INTERNAL MEDICINE

## 2024-09-16 PROCEDURE — 25000003 PHARM REV CODE 250: Mod: TXP

## 2024-09-16 RX ORDER — HYDROMORPHONE HYDROCHLORIDE 1 MG/ML
0.2 INJECTION, SOLUTION INTRAMUSCULAR; INTRAVENOUS; SUBCUTANEOUS EVERY 5 MIN PRN
Status: DISCONTINUED | OUTPATIENT
Start: 2024-09-16 | End: 2024-09-16 | Stop reason: HOSPADM

## 2024-09-16 RX ORDER — SUCCINYLCHOLINE CHLORIDE 20 MG/ML
INJECTION INTRAMUSCULAR; INTRAVENOUS
Status: DISCONTINUED | OUTPATIENT
Start: 2024-09-16 | End: 2024-09-16

## 2024-09-16 RX ORDER — ONDANSETRON HYDROCHLORIDE 2 MG/ML
4 INJECTION, SOLUTION INTRAVENOUS DAILY PRN
Status: DISCONTINUED | OUTPATIENT
Start: 2024-09-16 | End: 2024-09-16 | Stop reason: HOSPADM

## 2024-09-16 RX ORDER — PROPOFOL 10 MG/ML
VIAL (ML) INTRAVENOUS
Status: DISCONTINUED | OUTPATIENT
Start: 2024-09-16 | End: 2024-09-16

## 2024-09-16 RX ORDER — GLUCAGON 1 MG
1 KIT INJECTION
Status: DISCONTINUED | OUTPATIENT
Start: 2024-09-16 | End: 2024-09-16 | Stop reason: HOSPADM

## 2024-09-16 RX ORDER — FENTANYL CITRATE 50 UG/ML
INJECTION, SOLUTION INTRAMUSCULAR; INTRAVENOUS
Status: DISCONTINUED | OUTPATIENT
Start: 2024-09-16 | End: 2024-09-16

## 2024-09-16 RX ORDER — LIDOCAINE HYDROCHLORIDE 10 MG/ML
INJECTION, SOLUTION INTRAVENOUS
Status: DISCONTINUED | OUTPATIENT
Start: 2024-09-16 | End: 2024-09-16

## 2024-09-16 RX ORDER — ONDANSETRON HYDROCHLORIDE 2 MG/ML
INJECTION, SOLUTION INTRAVENOUS
Status: DISCONTINUED | OUTPATIENT
Start: 2024-09-16 | End: 2024-09-16

## 2024-09-16 RX ORDER — OXYCODONE HYDROCHLORIDE 5 MG/1
5 TABLET ORAL
Status: DISCONTINUED | OUTPATIENT
Start: 2024-09-16 | End: 2024-09-16 | Stop reason: HOSPADM

## 2024-09-16 RX ADMIN — FENTANYL CITRATE 50 MCG: 50 INJECTION, SOLUTION INTRAMUSCULAR; INTRAVENOUS at 01:09

## 2024-09-16 RX ADMIN — SUGAMMADEX 200 MG: 100 INJECTION, SOLUTION INTRAVENOUS at 01:09

## 2024-09-16 RX ADMIN — PROPOFOL 120 MG: 10 INJECTION, EMULSION INTRAVENOUS at 01:09

## 2024-09-16 RX ADMIN — SODIUM CHLORIDE: 0.9 INJECTION, SOLUTION INTRAVENOUS at 01:09

## 2024-09-16 RX ADMIN — LIDOCAINE HYDROCHLORIDE 75 MG: 10 INJECTION, SOLUTION INTRAVENOUS at 01:09

## 2024-09-16 RX ADMIN — ONDANSETRON 4 MG: 2 INJECTION INTRAMUSCULAR; INTRAVENOUS at 01:09

## 2024-09-16 RX ADMIN — SUCCINYLCHOLINE 160 MG: 20 INJECTION, SOLUTION INTRAMUSCULAR; INTRAVENOUS at 01:09

## 2024-09-16 NOTE — ANESTHESIA PROCEDURE NOTES
Intubation    Date/Time: 9/16/2024 1:07 PM    Performed by: Berkley Taylor CRNA  Authorized by: Irving Arevalo MD    Intubation:     Induction:  Intravenous    Intubated:  Postinduction    Mask Ventilation:  Not attempted    Attempts:  1    Attempted By:  CRNA    Method of Intubation:  Video laryngoscopy    Blade:  West 3    Laryngeal View Grade: Grade I - full view of cords      Difficult Airway Encountered?: No      Complications:  None    Airway Device:  Oral endotracheal tube    Airway Device Size:  7.5    Style/Cuff Inflation:  Cuffed (inflated to minimal occlusive pressure)    Tube secured:  20    Secured at:  The lips    Placement Verified By:  Capnometry    Complicating Factors:  None    Findings Post-Intubation:  BS equal bilateral

## 2024-09-16 NOTE — TRANSFER OF CARE
"Anesthesia Transfer of Care Note    Patient: Yamel Shah    Procedure(s) Performed: Procedure(s) (LRB):  Flexible bronchoscopy (BAL only) (N/A)    Patient location: [facility]    Anesthesia Type: general    Transport from OR: Transported from OR on 6-10 L/min O2 by face mask with adequate spontaneous ventilation    Post pain: adequate analgesia    Post assessment: no apparent anesthetic complications    Post vital signs: stable    Level of consciousness: awake    Nausea/Vomiting: no nausea/vomiting    Complications: none    Transfer of care protocol was followed      Last vitals: Visit Vitals  /60 (BP Location: Right arm, Patient Position: Lying)   Pulse 73   Temp 37 °C (98.6 °F) (Temporal)   Resp 18   Ht 5' 5" (1.651 m)   Wt 65.3 kg (144 lb)   SpO2 97%   Breastfeeding No   BMI 23.96 kg/m²     "

## 2024-09-16 NOTE — HOSPITAL COURSE
Underwent successful bronchoscopy with SHAUNA BAL for new cavitary lesion and hemoptysis.  No bleeding noted.  No complications.

## 2024-09-16 NOTE — ANESTHESIA POSTPROCEDURE EVALUATION
Anesthesia Post Evaluation    Patient: Yamel Shah    Procedure(s) Performed: Procedure(s) (LRB):  Flexible bronchoscopy (BAL only) (N/A)    Final Anesthesia Type: general      Patient location during evaluation: PACU  Patient participation: Yes- Able to Participate  Level of consciousness: awake and alert  Post-procedure vital signs: reviewed and stable  Pain management: adequate  Airway patency: patent    PONV status: None or treated.  Anesthetic complications: no      Cardiovascular status: hemodynamically stable  Respiratory status: spontaneous ventilation  Hydration status: euvolemic  Follow-up not needed.          Vitals Value Taken Time   /58 09/16/24 1337   Temp 36.7 °C (98.1 °F) 09/16/24 1325   Pulse 62 09/16/24 1343   Resp 23 09/16/24 1343   SpO2 96 % 09/16/24 1343   Vitals shown include unfiled device data.      No case tracking events are documented in the log.      Pain/Lory Score: Lory Score: 10 (9/16/2024  1:40 PM)

## 2024-09-16 NOTE — DISCHARGE SUMMARY
[facility] (2nd Fl)  Lung Transplant  Discharge Summary      Patient Name: Yamel Shah  MRN: [id number]  Admission Date: 9/16/2024  Hospital Length of Stay: 0 days  Discharge Date and Time: 09/16/2024 1:33 PM  Attending Physician: Roberta Leigh DO   Discharging Provider: Roberta Leigh DO  Primary Care Provider: Aly Rand MD     HPI: No notes on file    Procedure(s) (LRB):  Flexible bronchoscopy (BAL only) (N/A)     Hospital Course: Underwent successful bronchoscopy with SHAUNA BAL for new cavitary lesion and hemoptysis.  No bleeding noted.  No complications.      Goals of Care Treatment Preferences:  Code Status: Full Code          Significant Diagnostic Studies: Bronchoscopy: see full bronch report    Pending Diagnostic Studies:       Procedure Component Value Units Date/Time    Aspergillus Antigen, BAL [[id number]] Collected: 09/16/24 1314    Order Status: Sent Lab Status: In process Updated: 09/16/24 1314    Specimen: Bronchial Alveolar Lavage (BAL)     Cytomegalovirus by Quantitative PCR, BAL [[id number]] Collected: 09/16/24 1314    Order Status: Sent Lab Status: In process Updated: 09/16/24 1315    Specimen: Bronchial Alveolar Lavage (BAL)     Misc Sendout Test, Non-Blood Viracor [id number] [[id number]] Collected: 09/16/24 1314    Order Status: Sent Lab Status: In process Updated: 09/16/24 1315    RESPIRATORY PATHOGENS PANEL (TEM-PCR) Bronchial Wash [[id number]] Collected: 09/16/24 1314    Order Status: Sent Lab Status: In process Updated: 09/16/24 1315    WBC & Diff,Body Fluid Bronchial Wash [[id number]] Collected: 09/16/24 1314    Order Status: Sent Lab Status: In process Updated: 09/16/24 1315          There are no hospital problems to display for this patient.     Discharged Condition: good    Disposition: Home; ambulatory    Medications:  Reconciled Home Medications:      Medication List        CONTINUE taking these medications      acetaminophen-codeine 300-60mg  300-60 mg Tab  Commonly known as: TYLENOL #4  Take 1 tablet by mouth every evening.     * albuterol 2.5 mg /3 mL (0.083 %) nebulizer solution  Commonly known as: PROVENTIL  Take 3 mLs (2.5 mg total) by nebulization every 4 (four) hours as needed for Wheezing or Shortness of Breath (or cough). Rescue     * albuterol 90 mcg/actuation inhaler  Commonly known as: VENTOLIN HFA  Inhale 2 puffs into the lungs every 6 (six) hours as needed for Wheezing. Rescue     * albuterol sulfate 2.5 mg/0.5 mL Nebu  Take 2.5 mg by nebulization every 4 (four) hours as needed (shortness of breath/wheezing). Rescue     COMP-AIR NEBULIZER COMPRESSOR Rosemary  Generic drug: nebulizer and compressor  Use as directed     ergocalciferol 50,000 unit Cap  Commonly known as: ERGOCALCIFEROL  TAKE 1 CAPSULE BY MOUTH EVERY 7 DAYS     furosemide 20 MG tablet  Commonly known as: LASIX  Take 1 tablet (20 mg total) by mouth once daily.     multivitamin capsule  Take 1 capsule by mouth once daily.     mycophenolate mofetil 200 mg/mL Susr  Commonly known as: CELLCEPT  Take 5 mLs (1,000 mg total) by mouth 2 (two) times daily.     nabumetone 750 MG tablet  Commonly known as: RELAFEN  Take 1 tablet (750 mg total) by mouth daily as needed for Pain.     * NIFEdipine 30 MG (OSM) 24 hr tablet  Commonly known as: PROCARDIA-XL  TAKE 1 TABLET(30 MG) BY MOUTH EVERY DAY     * NIFEdipine 90 MG Tbsr  Commonly known as: ADALAT CC  TAKE 1 TABLET BY MOUTH EVERY EVENING WITH 30 MG FOR A TOTAL OF 120MG     OFEV 100 mg Cap  Generic drug: nintedanib  Take 100 mg by mouth 2 (two) times a day.     pravastatin 20 MG tablet  Commonly known as: PRAVACHOL  TAKE 1 TABLET(20 MG) BY MOUTH EVERY EVENING     pregabalin 300 MG Cap  Commonly known as: LYRICA  Take 1 capsule (300 mg total) by mouth 2 (two) times daily.     PREVIDENT 5000 BOOSTER PLUS 1.1 % Pste  Generic drug: fluoride (sodium)  SMARTSIG:To Teeth     PREVIDENT 5000 SENSITIVE 1.1-5 % Pste  Generic drug: sodium fluoride-pot  nitrate  BRUSH FOR 2 MINUTES TWICE PER DAY     RABEprazole 20 mg tablet  Commonly known as: ACIPHEX  Take 1 tablet (20 mg total) by mouth 2 (two) times daily.     tadalafil 20 mg Tab  Commonly known as: ADCIRCA  Take 2 tablets (40 mg total) by mouth once daily.     tiZANidine 4 MG tablet  Commonly known as: ZANAFLEX  Take 2 tablets (8 mg total) by mouth 2 (two) times daily as needed (muscle pain).           * This list has 5 medication(s) that are the same as other medications prescribed for you. Read the directions carefully, and ask your doctor or other care provider to review them with you.                Patient Instructions:      Diet general     Call MD for:  temperature >101     Call MD for:  coughing up blood greater than 3 tablespoons in volume     Call MD for:  chest pain     Call MD for:  difficulty breathing or shortness of breath     Call MD for:  development of yellow/green sputum     Activity as tolerated     Follow Up:   Follow-up Information       Roberta Leigh, DO Follow up.    Specialties: Transplant, Pulmonary Disease, Critical Care Medicine  Why: As needed  Contact information:  [address]  [facility] [address]  [phone]                             Roberta Leigh, DO  Lung Transplant  [facility] (2nd Fl)

## 2024-09-16 NOTE — ANESTHESIA PREPROCEDURE EVALUATION
09/16/2024  Yamel Shah is a 73 y.o., female.      Pre-op Assessment    I have reviewed the Patient Summary Reports.          Review of Systems  Anesthesia Hx:  No problems with previous Anesthesia                Social:  Non-Smoker       Hematology/Oncology:  Hematology Normal   Oncology Normal                                   EENT/Dental:  EENT/Dental Normal           Cardiovascular:     Hypertension               Pulmonary HTN PASP est 43 on 2D echo March 2024                         Pulmonary:  Pneumonia       Pulmonary fibrosis, scleroderma               Renal/:  Renal/ Normal                 Hepatic/GI:    Hiatal Hernia, GERD             Musculoskeletal:  Musculoskeletal Normal                Neurological:      Headaches                                 Endocrine:  Endocrine Normal            Dermatological:  Skin Normal    Psych:  Psychiatric Normal                    Physical Exam  General: Alert and Oriented    Airway:  Mallampati: II / II  Mouth Opening: Normal  TM Distance: Normal  Tongue: Normal  Neck ROM: Normal ROM    Dental:  Intact    Chest/Lungs:  Clear to auscultation, Normal Respiratory Rate    Heart:  Rate: Normal  Rhythm: Regular Rhythm  Sounds: Normal        Anesthesia Plan  Type of Anesthesia, risks & benefits discussed:    Anesthesia Type: Gen ETT  Intra-op Monitoring Plan: Standard ASA Monitors  Post Op Pain Control Plan: multimodal analgesia  Airway Plan: Direct  Informed Consent: Informed consent signed with the Patient and all parties understand the risks and agree with anesthesia plan.  All questions answered.   ASA Score: 3    Ready For Surgery From Anesthesia Perspective.     .

## 2024-09-17 LAB
ACID FAST MOD KINY STN SPEC: NORMAL
MYCOBACTERIUM SPEC QL CULT: NORMAL
PATH INTERP FLD-IMP: NORMAL

## 2024-09-18 DIAGNOSIS — G60.9 IDIOPATHIC NEUROPATHY: ICD-10-CM

## 2024-09-18 DIAGNOSIS — M34.89 CUTANEOUS SCLERODERMA: ICD-10-CM

## 2024-09-18 DIAGNOSIS — M79.18 MYOFASCIAL PAIN: ICD-10-CM

## 2024-09-18 LAB — GALACTOMANNAN AG SPEC-ACNC: <0.5 INDEX

## 2024-09-18 RX ORDER — ACETAMINOPHEN AND CODEINE PHOSPHATE 300; 60 MG/1; MG/1
1 TABLET ORAL NIGHTLY
Qty: 30 TABLET | Refills: 0 | Status: SHIPPED | OUTPATIENT
Start: 2024-09-18 | End: 2024-10-18

## 2024-09-18 RX ORDER — NABUMETONE 750 MG/1
750 TABLET, FILM COATED ORAL DAILY PRN
Qty: 30 TABLET | Refills: 3 | Status: SHIPPED | OUTPATIENT
Start: 2024-09-18

## 2024-09-18 NOTE — TELEPHONE ENCOUNTER
Patient requesting refill on acetaminophen-codeine 300-30mg (TYLENOL #3) 300-30 mg   Last office visit 07/30/24   shows last refill on 08/17/24  Patient does have a pain contract on file with [facility]  Patient last UDS 07/30/24 was consistent with current therapy                            CODEINE   Present Present CM     Not Detected       Comment: INTERPRETIVE INFORMATION: Codeine, U  Positive Cutoff: 40 ng/mL  Methodology: Mass Spectrometry   MORPHINE   Present Present CM     Not Detected       Comment: INTERPRETIVE INFORMATION:Morphine, U  Positive Cutoff: 20 ng/mL  Methodology: Mass Spectrometry   6-ACETYLMORPHINE   Not Detected Not Detected CM     Not Detected       Comment: INTERPRETIVE INFORMATION:6-acetylmorphine, U  Positive Cutoff: 20 ng/mL  Methodology: Mass Spectrometry   OXYCODONE   Not Detected Not Detected CM     Not Detected       Comment: INTERPRETIVE INFORMATION:Oxycodone, U  Positive Cutoff: 40 ng/mL  Methodology: Mass Spectrometry   NOROYXCODONE   Not Detected Not Detected CM     Not Detected       Comment: INTERPRETIVE INFORMATION:Noroxycodone, U  Positive Cutoff: 100 ng/mL  Methodology: Mass Spectrometry   OXYMORPHONE   Not Detected Not Detected CM     Not Detected       Comment: INTERPRETIVE INFORMATION:Oxymorphone, U  Positive Cutoff: 40 ng/mL  Methodology: Mass Spectrometry   NOROXYMORPHONE   Not Detected Not Detected CM     Not Detected       Comment: INTERPRETIVE INFORMATION:Noroxymorphone, U  Positive Cutoff: 100 ng/mL  Methodology: Mass Spectrometry   HYDROCODONE   Present Not Detected CM     Not Detected       Comment: INTERPRETIVE INFORMATION:Hydrocodone, U  Positive Cutoff: 40 ng/mL  Methodology: Mass Spectrometry   NORHYDROCODONE   Not Detected Not Detected CM     Not Detected       Comment: INTERPRETIVE INFORMATION:Norhydrocodone, U  Positive Cutoff: 100 ng/mL  Methodology: Mass Spectrometry   HYDROMORPHONE   Not Detected Not Detected CM     Not  Detected       Comment: INTERPRETIVE INFORMATION:Hydromorphone, U  Positive Cutoff: 20 ng/mL  Methodology: Mass Spectrometry   BUPRENORPHINE   Not Detected Not Detected CM     Not Detected       Comment: INTERPRETIVE INFORMATION:Buprenorphine, U  Positive Cutoff: 5 ng/mL  Methodology: Mass Spectrometry   NORUBPRENORPHINE   Not Detected Not Detected CM     Not Detected       Comment: INTERPRETIVE INFORMATION:Norbuprenorphine, U  Positive Cutoff: 20 ng/mL  Methodology: Mass Spectrometry   FENTANYL   Not Detected Not Detected CM     Not Detected       Comment: INTERPRETIVE INFORMATION:Fentanyl, U  Positive Cutoff: 2 ng/mL  Methodology: Mass Spectrometry   NORFENTANYL   Not Detected Not Detected CM     Not Detected       Comment: INTERPRETIVE INFORMATION:Norfentanyl, U  Positive Cutoff: 2 ng/mL  Methodology: Mass Spectrometry   MEPERIDINE METABOLITE   Not Detected Not Detected CM     Not Detected       Comment: INTERPRETIVE INFORMATION:Meperidine metabolite, U  Positive Cutoff: 50 ng/mL  Methodology: Mass Spectrometry   TAPENTADOL   Not Detected Not Detected CM     Not Detected       Comment: INTERPRETIVE INFORMATION:Tapentadol, U  Positive Cutoff: 100 ng/mL  Methodology: Mass Spectrometry   TAPENTADOL-O-SULF   Not Detected Not Detected CM     Not Detected       Comment: INTERPRETIVE INFORMATION:Tapentadol-o-Sulf, U  Positive Cutoff: 200 ng/mL  Methodology: Mass Spectrometry   METHADONE   Negative Negative CM     Not Detected       Comment: Presumptive negative by immunoassay. Testing by mass  spectrometry is available on request.  INTERPRETIVE INFORMATION: Methadone Screen, U  Positive Cutoff: 150 ng/mL  Methodology: Immunoassay   TRAMADOL   Negative Negative CM     Not Detected       Comment: Presumptive negative by immunoassay. Testing by mass  spectrometry is available on request.  INTERPRETIVE INFORMATION:Tramadol Screen, U  Positive Cutoff: 100 ng/mL  Methodology: Immunoassay   AMPHETAMINE   Not Detected Not  Detected CM     Not Detected       Comment: INTERPRETIVE INFORMATION:Amphetamine, U  Positive Cutoff: 50 ng/mL  Methodology: Mass Spectrometry   METHAMPHETAMINE   Not Detected Not Detected CM     Not Detected       Comment: INTERPRETIVE INFORMATION:Methamphetamine, U  Positive Cutoff: 200 ng/mL  Methodology: Mass Spectrometry   MDMA- ECSTASY   Not Detected Not Detected CM     Not Detected       Comment: INTERPRETIVE INFORMATION:MDMA, U  Positive Cutoff: 200 ng/mL  Methodology: Mass Spectrometry   MDA   Not Detected Not Detected CM     Not Detected       Comment: INTERPRETIVE INFORMATION:MDA, U  Positive Cutoff: 200 ng/mL  Methodology: Mass Spectrometry   MDEA- Mickie   Not Detected Not Detected CM     Not Detected       Comment: INTERPRETIVE INFORMATION:MDEA, U  Positive Cutoff: 200 ng/mL  Methodology: Mass Spectrometry   METHYLPHENIDATE   Not Detected Not Detected CM     Not Detected       Comment: INTERPRETIVE INFORMATION:Methylphenidate, U  Positive Cutoff: 100 ng/mL  Methodology: Mass Spectrometry   PHENTERMINE   Not Detected Not Detected CM     Not Detected       Comment: INTERPRETIVE INFORMATION:Phentermine, U  Positive Cutoff: 100 ng/mL  Methodology: Mass Spectrometry   BENZOYLECGONINE   Negative Negative CM     Not Detected       Comment: Presumptive negative by immunoassay. Testing by mass  spectrometry is available on request.  INTERPRETIVE INFORMATION:Cocaine Screen, U  Positive Cutoff: 150 ng/mL  Methodology: Immunoassay   ALPRAZOLAM   Not Detected Not Detected CM     Not Detected       Comment: INTERPRETIVE INFORMATION:Alprazolam, U  Positive Cutoff: 40 ng/mL  Methodology: Mass Spectrometry   ALPHA-OH-ALPRAZOLAM   Not Detected Not Detected CM     Not Detected       Comment: INTERPRETIVE INFORMATION:Alpha-OH-Alprazolam, U  Positive Cutoff: 20 ng/mL  Methodology: Mass Spectrometry   CLONAZEPAM   Not Detected Not Detected CM     Not Detected       Comment: INTERPRETIVE INFORMATION:Clonazepam, U  Positive  Cutoff: 20 ng/mL  Methodology: Mass Spectrometry   7-AMINOCLONAZEPAM   Not Detected Not Detected CM     Not Detected       Comment: INTERPRETIVE INFORMATION:7-Aminoclonazepam, U  Positive Cutoff: 40 ng/mL  Methodology: Mass Spectrometry   DIAZEPAM   Not Detected Not Detected CM     Not Detected       Comment: INTERPRETIVE INFORMATION:Diazepam, U  Positive Cutoff: 50 ng/mL  Methodology: Mass Spectrometry   NORDIAZEPAM   Not Detected Not Detected CM     Not Detected       Comment: INTERPRETIVE INFORMATION:Nordiazepam, U  Positive Cutoff: 50 ng/mL  Methodology: Mass Spectrometry   OXAZEPAM   Not Detected Not Detected CM     Not Detected       Comment: INTERPRETIVE INFORMATION:Oxazepam, U  Positive Cutoff: 50 ng/mL  Methodology: Mass Spectrometry   TEMAZEPAM   Not Detected Not Detected CM     Not Detected       Comment: INTERPRETIVE INFORMATION:Temazepam, U  Positive Cutoff: 50 ng/mL  Methodology: Mass Spectrometry   Lorazepam   Not Detected Not Detected CM     Not Detected       Comment: INTERPRETIVE INFORMATION:Lorazepam, U  Positive Cutoff: 60 ng/mL  Methodology: Mass Spectrometry   MIDAZOLAM   Not Detected Not Detected CM     Not Detected       Comment: INTERPRETIVE INFORMATION:Midazolam, U  Positive Cutoff: 20 ng/mL  Methodology: Mass Spectrometry   ZOLPIDEM   Not Detected Not Detected CM     Not Detected       Comment: INTERPRETIVE INFORMATION:Zolpidem, U  Positive Cutoff: 20 ng/mL  Methodology: Mass Spectrometry   BARBITURATES   Negative Negative CM     Not Detected       Comment: Presumptive negative by immunoassay. Testing by mass  spectrometry is available on request.  INTERPRETIVE INFORMATION:Barbiturates Screen, U  Positive Cutoff: 200 ng/mL  Methodology: Immunoassay   Creatinine, Urine 20.0 - 400.0 mg/dL 145.0 112.0 47.0 R 103.0 R 344.5 139.0 R,  R   ETHYL GLUCURONIDE   Negative Negative CM     Present       Comment: Presumptive negative by immunoassay. Testing by mass  spectrometry is available on  request.  INTERPRETIVE INFORMATION:Ethyl Glucuronide Screen, U  Positive Cutoff: 500 ng/mL  Methodology: Immunoassay   MARIJUANA METABOLITE   Negative Negative CM     Not Detected       Comment: Presumptive negative by immunoassay. Testing by mass  spectrometry is available on request.  INTERPRETIVE INFORMATION: THC (Cannabinoids) Screen, U  Positive Cutoff: 50 ng/mL  Methodology: Immunoassay   PCP   Negative Negative CM     Not Detected       Comment: Presumptive negative by immunoassay. Testing by mass  spectrometry is available on request.  INTERPRETIVE INFORMATION:Phencyclidine Screen, U  Positive Cutoff: 25 ng/mL  Methodology: Immunoassay   CARISOPRODOL   Negative Negative CM     Not Detected CM       Comment: Presumptive negative by immunoassay. Testing by mass  spectrometry is available on request.  INTERPRETIVE INFORMATION: Carisoprodol Screen, U  Positive Cutoff: 100 ng/mL  Methodology: Immunoassay  The carisoprodol immunoassay has cross-reactivity to  carisoprodol and meprobamate.   Naloxone   Not Detected Not Detected CM     Not Detected       Comment: INTERPRETIVE INFORMATION:Naloxone, U  Positive Cutoff: 100 ng/mL  Methodology: Mass Spectrometry   Gabapentin   Not Detected Not Detected CM     Not Detected       Comment: INTERPRETIVE INFORMATION:Gabapentin, U  Positive Cutoff: 3,000 ng/mL  Methodology: Mass Spectrometry   Pregabalin   Present Present CM     Present       Comment: INTERPRETIVE INFORMATION:Pregabalin, U  Positive Cutoff: 3,000 ng/mL  Methodology: Mass Spectrometry   Alpha-OH-Midazolam   Not Detected Not Detected CM     Not Detected       Comment: INTERPRETIVE INFORMATION:Alpha-OH-Midazolam, U  Positive Cutoff: 20 ng/mL  Methodology: Mass Spectrometry   Zolpidem Metabolite   Not Detected Not Detected CM     Not Detected       Comment: INTERPRETIVE INFORMATION:Zolpidem Metabolite, U  Positive Cutoff: 100 ng/mL  Methodology: Mass Spectrometry   PAIN MANAGEMENT DRUG PANEL   See Below See Below  CM     See Below CM

## 2024-09-19 LAB
CMV DNA SPEC QL NAA+PROBE: NEGATIVE
ENTEROVIRUS/RHINOVIRUS: NOT DETECTED
FINAL PATHOLOGIC DIAGNOSIS: NORMAL
HUMAN BOCAVIRUS: NOT DETECTED
HUMAN CORONAVIRUS, COMMON COLD VIRUS: NOT DETECTED
INFLUENZA A - H1N1-09: NOT DETECTED
LEGIONELLA PNEUMOPHILA: NOT DETECTED
Lab: NORMAL
MORAXELLA CATARRHALIS: NOT DETECTED
PARAINFLUENZA: NOT DETECTED
RVP - ADENOVIRUS: NOT DETECTED
RVP - HUMAN METAPNEUMOVIRUS (HMPV): NOT DETECTED
RVP - INFLUENZA A: NOT DETECTED
RVP - INFLUENZA B: NOT DETECTED
RVP - RESPIRATORY SYNCTIAL VIRUS (RSV) A: NOT DETECTED
RVP - RESPIRATORY VIRAL PANEL, SOURCE: NORMAL
SPECIMEN SOURCE: NORMAL
TEM - ACINETOBACTER BAUMANNII: NOT DETECTED
TEM - BORDETELLA PERTUSSIS: NOT DETECTED
TEM - CHLAMYDOPHILA PNEUMONIAE: NOT DETECTED
TEM - KLEBSIELLA PNEUMONIAE: NOT DETECTED
TEM - MRSA: NOT DETECTED
TEM - MYCOPLASMA PNEUMONIAE: NOT DETECTED
TEM - NEISSERIA MENINGITIDIS: NOT DETECTED
TEM - PANTON-VALENTINE: NOT DETECTED
TEM - PSEUDOMONAS AERUGINOSA: NOT DETECTED
TEM - STAPHYLOCOCCUS AUREUS: NOT DETECTED
TEM - STREPTOCOCCUS PNEUMONIAE: NOT DETECTED
TEM - STREPTOCOCCUS PYOGENES A: NOT DETECTED
TEM- HAEMOPHILUS INFLUENZAE B: NOT DETECTED
TEM- HAEMOPHILUS INFLUENZAE: NOT DETECTED

## 2024-09-20 LAB
BACTERIA SPT CF RESP CULT: NO GROWTH
GRAM STN SPEC: NORMAL

## 2024-09-24 ENCOUNTER — PATIENT MESSAGE (OUTPATIENT)
Dept: FAMILY MEDICINE | Facility: CLINIC | Age: 74
End: 2024-09-24
Payer: MEDICARE

## 2024-09-24 ENCOUNTER — TELEPHONE (OUTPATIENT)
Dept: TRANSPLANT | Facility: CLINIC | Age: 74
End: 2024-09-24
Payer: MEDICARE

## 2024-09-24 DIAGNOSIS — R93.89 ABNORMAL CHEST CT: Primary | ICD-10-CM

## 2024-09-24 NOTE — TELEPHONE ENCOUNTER
Request to . ~10/25/24  ----- Message from Roberta Leigh DO sent at 9/24/2024  9:56 AM CDT -----  Regarding: RE: follow up plan  Cx still pending.  Going to wait for a little while as fungal infections can take awhile.  Follow-up 6 weeks from her last CT with repeat CT chest.  Thanks  ----- Message -----  From: Johanna Fernandez  Sent: 9/23/2024   3:49 PM CDT  To: Roberta Leigh DO  Subject: follow up plan                                   Please advise on follow up plan for this patient if determined based on results/bronch. Cx is pending.

## 2024-09-25 ENCOUNTER — OFFICE VISIT (OUTPATIENT)
Dept: PODIATRY | Facility: CLINIC | Age: 74
End: 2024-09-25
Payer: MEDICARE

## 2024-09-25 VITALS
OXYGEN SATURATION: 98 % | BODY MASS INDEX: 23.98 KG/M2 | SYSTOLIC BLOOD PRESSURE: 144 MMHG | HEIGHT: 65 IN | DIASTOLIC BLOOD PRESSURE: 65 MMHG | WEIGHT: 143.94 LBS | HEART RATE: 60 BPM

## 2024-09-25 DIAGNOSIS — L97.922 ULCERS OF BOTH LOWER EXTREMITIES WITH FAT LAYER EXPOSED: ICD-10-CM

## 2024-09-25 DIAGNOSIS — L97.912 ULCERS OF BOTH LOWER EXTREMITIES WITH FAT LAYER EXPOSED: ICD-10-CM

## 2024-09-25 DIAGNOSIS — M79.89 SWELLING OF BOTH LOWER EXTREMITIES: ICD-10-CM

## 2024-09-25 DIAGNOSIS — G60.9 IDIOPATHIC NEUROPATHY: Primary | ICD-10-CM

## 2024-09-25 DIAGNOSIS — L03.119 CELLULITIS OF LOWER EXTREMITY, UNSPECIFIED LATERALITY: ICD-10-CM

## 2024-09-25 LAB
GRAM STN SPEC: NORMAL

## 2024-09-25 PROCEDURE — 99215 OFFICE O/P EST HI 40 MIN: CPT | Mod: PBBFAC,25 | Performed by: STUDENT IN AN ORGANIZED HEALTH CARE EDUCATION/TRAINING PROGRAM

## 2024-09-25 PROCEDURE — 87205 SMEAR GRAM STAIN: CPT | Mod: TXP | Performed by: STUDENT IN AN ORGANIZED HEALTH CARE EDUCATION/TRAINING PROGRAM

## 2024-09-25 PROCEDURE — 87070 CULTURE OTHR SPECIMN AEROBIC: CPT | Mod: TXP | Performed by: STUDENT IN AN ORGANIZED HEALTH CARE EDUCATION/TRAINING PROGRAM

## 2024-09-25 PROCEDURE — 99999 PR PBB SHADOW E&M-EST. PATIENT-LVL V: CPT | Mod: PBBFAC,,, | Performed by: STUDENT IN AN ORGANIZED HEALTH CARE EDUCATION/TRAINING PROGRAM

## 2024-09-25 PROCEDURE — 87186 SC STD MICRODIL/AGAR DIL: CPT | Mod: 59,TXP | Performed by: STUDENT IN AN ORGANIZED HEALTH CARE EDUCATION/TRAINING PROGRAM

## 2024-09-25 PROCEDURE — 29580 STRAPPING UNNA BOOT: CPT | Mod: PBBFAC | Performed by: STUDENT IN AN ORGANIZED HEALTH CARE EDUCATION/TRAINING PROGRAM

## 2024-09-25 PROCEDURE — 87075 CULTR BACTERIA EXCEPT BLOOD: CPT | Mod: TXP | Performed by: STUDENT IN AN ORGANIZED HEALTH CARE EDUCATION/TRAINING PROGRAM

## 2024-09-25 RX ORDER — DOXYCYCLINE 100 MG/1
100 CAPSULE ORAL EVERY 12 HOURS
Qty: 14 CAPSULE | Refills: 0 | Status: SHIPPED | OUTPATIENT
Start: 2024-09-25

## 2024-09-25 RX ORDER — DULOXETIN HYDROCHLORIDE 30 MG/1
30 CAPSULE, DELAYED RELEASE ORAL DAILY
Qty: 30 CAPSULE | Refills: 11 | Status: SHIPPED | OUTPATIENT
Start: 2024-09-25 | End: 2025-09-25

## 2024-09-25 NOTE — PROGRESS NOTES
Subjective:     Patient    Yamel Shah is a 73 y.o. female.    Problem    Seen by Dr Morton in 2021 for chronic bilateral foot wounds. Has been caring for them herself for the past few years because she felt she never made much progress with podiatry or wound care. Now presents with chronic bilateral foot wounds, burning, stinging which can sometimes keep her up at night. Previously tried amitriptyline but it cause headaches and nightmares.      History    History obtained from patient and review of medical records.     Past Medical History:   Diagnosis Date    Abnormal Pap smear     Acid reflux     Allergy     Arthritis     Encounter for blood transfusion     GERD (gastroesophageal reflux disease)     Hiatal hernia 03/24/2023    History of migraine headaches     Hx of colonic polyp     Hypertension     Idiopathic neuropathy 07/20/2012    ILD (interstitial lung disease) 11/06/2013    Iron deficiency anemia 03/18/2014    MRSA carrier     Osteopenia     Pneumonia     Pulmonary fibrosis     Pulmonary hypertension     Raynaud's disease     Scleroderma, diffuse     Sjogren's syndrome     Vitamin D deficiency 11/14/2013       Past Surgical History:   Procedure Laterality Date    24 HOUR IMPEDANCE PH MONITORING OF ESOPHAGUS IN PATIENT NOT TAKING ACID REDUCING MEDICATIONS N/A 03/04/2020    Procedure: IMPEDANCE PH STUDY, ESOPHAGEAL, 24 HOUR, IN PATIENT NOT TAKING ACID REDUCING MEDICATION;  Surgeon: Annamaria Mendoza MD;  Location: [facility] ([address] [place] [address]);  Service: Endoscopy;  Laterality: N/A;  OFF PPI/H2 Blocker   Motility Studies   Hold Narcotics x 1 days   Hold TCA x 1 days  2/26 - LVM attempting to confirm appt  2/27 - Confirmed appt    ANORECTAL MANOMETRY N/A 05/10/2021    Procedure: MANOMETRY, ANORECTAL with balloon expulsion test;  Surgeon: Annamaria Mendoza MD;  [facility]  Service: Endoscopy;  Laterality: N/A;  order combined  covid test 5/7 [employer], instructions emailed-[facility]     BREAST BIOPSY      Left, benign    BRONCHOSCOPY N/A 10/2/2023    Procedure: bronch;  Surgeon: Roberta Leigh DO;  Location: [facility] OR 2ND FLR;  Service: Endoscopy;  Laterality: N/A;    BRONCHOSCOPY N/A 9/16/2024    Procedure: Flexible bronchoscopy (BAL only);  Surgeon: Roberta Leigh DO;  Location: [facility] OR 2ND FLR;  Service: Endoscopy;  Laterality: N/A;    CERVICAL CONIZATION   W/ LASER  1970    COLONOSCOPY      COLONOSCOPY N/A 03/29/2019    Procedure: COLONOSCOPY;  Surgeon: Annamaria Mendoza MD;  Location: [facility] (2ND FLR);  Service: Endoscopy;  Laterality: N/A;    COLONOSCOPY N/A 05/10/2021    Procedure: COLONOSCOPY;  Surgeon: Annamaria Mendoza MD;  Location: [facility] (4TH FLR);  Service: Endoscopy;  Laterality: N/A;  2nd floor-previous scopes done on 2nd floor, gastroparesis  full liquid diet x2 days, clear liquid x1 day prior to procedure  covid test 5/7 [employer], instructions emailed-[facility]  5/6 pt confirmed appt-[facility]    DILATION AND CURETTAGE OF UTERUS      ESOPHAGEAL MANOMETRY WITH MEASUREMENT OF IMPEDANCE N/A 03/04/2020    Procedure: MANOMETRY, ESOPHAGUS, WITH IMPEDANCE MEASUREMENT;  Surgeon: Annamaria Mendoza MD;  Location: [facility] (4TH FLR);  Service: Endoscopy;  Laterality: N/A;  OFF PPI/H2 Blocker   Motility Studies   Hold Narcotics x 1 days   Hold TCA x 1 days    ESOPHAGOGASTRODUODENOSCOPY      ESOPHAGOGASTRODUODENOSCOPY N/A 03/29/2019    Procedure: EGD (ESOPHAGOGASTRODUODENOSCOPY);  Surgeon: Annamaria Mendoza MD;  Location: [facility] (2ND FLR);  Service: Endoscopy;  Laterality: N/A;  pulmonary htn    ESOPHAGOGASTRODUODENOSCOPY N/A 02/02/2021    Procedure: ESOPHAGOGASTRODUODENOSCOPY (EGD);  Surgeon: Annamaria Mendoza MD;  Location: [facility] ENDO (2ND FLR);  Service: Endoscopy;  Laterality: N/A;  2nd floor gastroparesis  3 days full liquid diet and 1 day clears  covid test 1/30 primary care, instructions sent to [facility]    ESOPHAGOGASTRODUODENOSCOPY N/A 07/01/2022    Procedure:  ESOPHAGOGASTRODUODENOSCOPY (EGD);  Surgeon: Annamaria Mendoza MD;  Location: [facility] ENDO (2ND FLR);  Service: Endoscopy;  Laterality: N/A;  2nd floor-gastroparesis  full liquid diet x3 days, clear liquid diet x1 day prior to procedure  fully vaccinated, instructions sent to [facility]  6/29-pt confirmed new arrival time-[facility]    EXCISION, LESION, STOMACH, LAPAROSCOPIC N/A 03/23/2023    Procedure: XI ROBOTIC EXCISION, LESION, STOMACH;  Surgeon: Katherine Segura MD;  [facility];  Service: General;  Laterality: N/A;    EXCISIONAL BIOPSY, LYMPH NODE Right 05/26/2023    Procedure: EXCISIONAL BIOPSY, LYMPH NODE, INGUINAL;  Surgeon: Katherine Segura MD;  [facility];  Service: General;  Laterality: Right;    HYSTERECTOMY  1990    FRANDY (AUB, Fibroids), ovaries remain    REPAIR, HERNIA, UMBILICAL N/A 03/23/2023    Procedure: REPAIR, HERNIA, UMBILICAL;  Surgeon: Katherine Segura MD;  [facility];  Service: General;  Laterality: N/A;    RIGHT HEART CATHETERIZATION Right 01/05/2021    Procedure: INSERTION, CATHETER, RIGHT HEART;  Surgeon: Aureliano Sy MD;  Location: [facility] CATH LAB;  Service: Cardiology;  Laterality: Right;    RIGHT HEART CATHETERIZATION Right 03/16/2023    Procedure: INSERTION, CATHETER, RIGHT HEART;  Surgeon: Aureliano Sy MD;  Location: [facility] CATH LAB;  Service: Cardiology;  Laterality: Right;    ROBOT-ASSISTED LAPAROSCOPIC REPAIR OF INGUINAL HERNIA USING DA VERNELL XI Right 05/26/2023    Procedure: XI ROBOTIC REPAIR, HERNIA, INGUINAL, FEMORAL;  Surgeon: Katherine Segura MD;  Location: [address];  Service: General;  Laterality: Right;    ROBOT-ASSISTED REPAIR OF HIATAL HERNIA USING DA VERNELL XI N/A 03/23/2023    Procedure: XI ROBOTIC REPAIR, HERNIA, HIATAL;  Surgeon: Katherine Segura MD;  [facility];  Service: General;  Laterality: N/A;    VARICOSE VEIN SURGERY      XI ROBOTIC GASTROPEXY  N/A 2023    Procedure: XI ROBOTIC GASTROPEXY;  Surgeon: Katherine Segura MD;  Location: [facility] OR 2ND FLR;  Service: General;  Laterality: N/A;    XI ROBOTIC PYLOROMYOTOMY N/A 2023    Procedure: XI ROBOTIC PYLOROMYOTOMY;  Surgeon: Katherine Segrua MD;  Location: [facility] OR 2ND FLR;  Service: General;  Laterality: N/A;        Objective:     Vitals  Wt Readings from Last 1 Encounters:   24 65.3 kg (143 lb 15.4 oz)     Temp Readings from Last 1 Encounters:   24 98.3 °F (36.8 °C)     BP Readings from Last 1 Encounters:   24 (!) 144/65     Pulse Readings from Last 1 Encounters:   24 60       Dermatological Exam    Skin:  Skin of both feet thickened (scleroderma), stiff, with extensive ulcerations of both feet down to fat                      Nails:  10 nail(s) elongated, 10 nail(s) thickened, and 10 nail(s) discolored    Vascular Exam    Arteries:  Posterior tibial artery palpable on right  Dorsalis pedis artery palpable on right  Posterior tibial artery palpable on left  Dorsalis pedis artery palpable on left    Veins:  Superficial veins unremarkable on right  Superficial veins unremarkable on left    Swellin+ nonpitting on right  1+ nonpitting on left    Neurological Exam    [place] touch test:  6/6 sites sensed, light touch intact     Musculoskeletal Exam    Footwear:  Casual on right  Casual on left    Gait Exam:   Ambulatory Status: Ambulatory  Gait: Normal  Assistive Devices: None    Foot Progression Angle:  Normal on right  Normal on left    Right Lower Extremity Additional Findings:  Right foot and ankle function, strength, and range of motion unremarkable except as noted above.     Left Lower Extremity Additional Findings:  Left foot and ankle function, strength, and range of motion unremarkable except as noted above.    Imaging and Other Tests    Imaging:  Independently reviewed and interpreted imaging, findings are as follows: N/A     Assessment:      Encounter Diagnoses   Name Primary?    Idiopathic neuropathy Yes    Cellulitis of lower extremity, unspecified laterality     Ulcers of both lower extremities with fat layer exposed     Swelling of both lower extremities         Plan:     I counseled the patient on her conditions, their implications and medical management.    Neuropathy: chronic stable  -Start duloxetine.     Bilateral ulcers, cellulitis, swelling: chronic stable  -Cultures collected. Start doxycycline.   -Unna boot applied to left foot-ankle. If improving patient is amenable to bilateral unna boots. Standard dressing applied to right foot.   -Protected WB in surgical shoes.     Return to clinic in 1 week, call sooner PRN.

## 2024-09-27 LAB
BACTERIA SPEC AEROBE CULT: ABNORMAL
BACTERIA SPEC ANAEROBE CULT: NORMAL

## 2024-09-30 ENCOUNTER — PATIENT MESSAGE (OUTPATIENT)
Dept: PODIATRY | Facility: CLINIC | Age: 74
End: 2024-09-30
Payer: MEDICARE

## 2024-09-30 DIAGNOSIS — L03.119 CELLULITIS OF LOWER EXTREMITY, UNSPECIFIED LATERALITY: Primary | ICD-10-CM

## 2024-10-01 ENCOUNTER — TELEPHONE (OUTPATIENT)
Dept: PODIATRY | Facility: CLINIC | Age: 74
End: 2024-10-01
Payer: MEDICARE

## 2024-10-01 DIAGNOSIS — L03.119 CELLULITIS OF LOWER EXTREMITY, UNSPECIFIED LATERALITY: Primary | ICD-10-CM

## 2024-10-01 RX ORDER — LEVOFLOXACIN 750 MG/1
750 TABLET ORAL DAILY
Qty: 10 TABLET | Refills: 0 | Status: SHIPPED | OUTPATIENT
Start: 2024-10-01 | End: 2024-10-02

## 2024-10-01 NOTE — TELEPHONE ENCOUNTER
----- Message from Med Assistant Delvin sent at 10/1/2024  9:42 AM CDT -----  Patient uses the pharmacy that's listed  ----- Message -----  From: Yannick Nava DPM  Sent: 9/30/2024   6:16 PM CDT  To: Maisha Baker MA; Delvin Starr MA    Please find out which pharmacy she would prefer, I need to send updated antibiotics based on culture results -Jacob

## 2024-10-02 ENCOUNTER — TELEPHONE (OUTPATIENT)
Dept: PODIATRY | Facility: CLINIC | Age: 74
End: 2024-10-02
Payer: MEDICARE

## 2024-10-02 DIAGNOSIS — L03.119 CELLULITIS OF LOWER EXTREMITY, UNSPECIFIED LATERALITY: Primary | ICD-10-CM

## 2024-10-02 RX ORDER — LEVOFLOXACIN 25 MG/ML
500 SOLUTION ORAL DAILY
Qty: 200 ML | Refills: 0 | Status: SHIPPED | OUTPATIENT
Start: 2024-10-02

## 2024-10-02 NOTE — TELEPHONE ENCOUNTER
----- Message from Med Assistant Ferrera sent at 10/2/2024  4:29 PM CDT -----  Regarding: FW: Liquid Form  Contact: Pt @ [phone]    ----- Message -----  From: Shirley Benavidez  Sent: 10/2/2024   4:03 PM CDT  To: [facility] Staff  Subject: Liquid Form                                      Pt is calling to get rx   levoFLOXacin (LEVAQUIN) 750 MG tablet  in liquid form not tablets.     [id number] - [place], [place] - [address] AT [facility] [address]  [address]  [facility] [address]  Phone: [phone] Fax: [phone]

## 2024-10-04 ENCOUNTER — OFFICE VISIT (OUTPATIENT)
Dept: PODIATRY | Facility: CLINIC | Age: 74
End: 2024-10-04
Payer: MEDICARE

## 2024-10-04 VITALS
DIASTOLIC BLOOD PRESSURE: 72 MMHG | HEIGHT: 65 IN | BODY MASS INDEX: 23.98 KG/M2 | SYSTOLIC BLOOD PRESSURE: 138 MMHG | TEMPERATURE: 98 F | WEIGHT: 143.94 LBS | HEART RATE: 68 BPM

## 2024-10-04 DIAGNOSIS — L97.912 ULCERS OF BOTH LOWER EXTREMITIES WITH FAT LAYER EXPOSED: ICD-10-CM

## 2024-10-04 DIAGNOSIS — L97.922 ULCERS OF BOTH LOWER EXTREMITIES WITH FAT LAYER EXPOSED: ICD-10-CM

## 2024-10-04 DIAGNOSIS — L03.119 CELLULITIS OF LOWER EXTREMITY, UNSPECIFIED LATERALITY: Primary | ICD-10-CM

## 2024-10-04 DIAGNOSIS — G60.9 IDIOPATHIC NEUROPATHY: ICD-10-CM

## 2024-10-04 PROCEDURE — 99999 PR PBB SHADOW E&M-EST. PATIENT-LVL IV: CPT | Mod: PBBFAC,,, | Performed by: STUDENT IN AN ORGANIZED HEALTH CARE EDUCATION/TRAINING PROGRAM

## 2024-10-04 PROCEDURE — 99214 OFFICE O/P EST MOD 30 MIN: CPT | Mod: PBBFAC | Performed by: STUDENT IN AN ORGANIZED HEALTH CARE EDUCATION/TRAINING PROGRAM

## 2024-10-04 NOTE — PROGRESS NOTES
Subjective:     Patient    Yamel Shah is a 73 y.o. female.    Problem    09/25/24: Seen by Dr Morton in 2021 for chronic bilateral foot wounds. Has been caring for them herself for the past few years because she felt she never made much progress with podiatry or wound care. Now presents with chronic bilateral foot wounds, burning, stinging which can sometimes keep her up at night. Previously tried amitriptyline but it cause headaches and nightmares.      10/04/24: Returns for chronic bilateral foot wounds, no new issues. On antibiotics.     History    History obtained from patient and review of medical records.     Past Medical History:   Diagnosis Date    Abnormal Pap smear     Acid reflux     Allergy     Arthritis     Encounter for blood transfusion     GERD (gastroesophageal reflux disease)     Hiatal hernia 03/24/2023    History of migraine headaches     Hx of colonic polyp     Hypertension     Idiopathic neuropathy 07/20/2012    ILD (interstitial lung disease) 11/06/2013    Iron deficiency anemia 03/18/2014    MRSA carrier     Osteopenia     Pneumonia     Pulmonary fibrosis     Pulmonary hypertension     Raynaud's disease     Scleroderma, diffuse     Sjogren's syndrome     Vitamin D deficiency 11/14/2013       Past Surgical History:   Procedure Laterality Date    24 HOUR IMPEDANCE PH MONITORING OF ESOPHAGUS IN PATIENT NOT TAKING ACID REDUCING MEDICATIONS N/A 03/04/2020    Procedure: IMPEDANCE PH STUDY, ESOPHAGEAL, 24 HOUR, IN PATIENT NOT TAKING ACID REDUCING MEDICATION;  Surgeon: Annamaria Mendoza MD;  Location: [facility] ([address] [place] [address]);  Service: Endoscopy;  Laterality: N/A;  OFF PPI/H2 Blocker   Motility Studies   Hold Narcotics x 1 days   Hold TCA x 1 days  2/26 - LVM attempting to confirm appt  2/27 - Confirmed appt    ANORECTAL MANOMETRY N/A 05/10/2021    Procedure: MANOMETRY, ANORECTAL with balloon expulsion test;  Surgeon: Annamaria Mendoza MD;  Location: [facility] ([address] [place] [address]);  Service:  Endoscopy;  Laterality: N/A;  order combined  covid test 5/7 [employer] orth[employer], instructions emailed-KPvt    BREAST BIOPSY      Left, benign    BRONCHOSCOPY N/A 10/2/2023    Procedure: bronch;  Surgeon: Roberta Leigh DO;  Location: [facility] OR 2ND FLR;  Service: Endoscopy;  Laterality: N/A;    BRONCHOSCOPY N/A 9/16/2024    Procedure: Flexible bronchoscopy (BAL only);  Surgeon: Roberta Leigh DO;  Location: [facility] OR 2ND FLR;  Service: Endoscopy;  Laterality: N/A;    CERVICAL CONIZATION   W/ LASER  1970    COLONOSCOPY      COLONOSCOPY N/A 03/29/2019    Procedure: COLONOSCOPY;  Surgeon: Annamaria Mendoza MD;  Location: [facility] (2ND FLR);  Service: Endoscopy;  Laterality: N/A;    COLONOSCOPY N/A 05/10/2021    Procedure: COLONOSCOPY;  Surgeon: Annamaria Mendoza MD;  Location: [facility] ENDO (4TH FLR);  Service: Endoscopy;  Laterality: N/A;  2nd floor-previous scopes done on 2nd floor, gastroparesis  full liquid diet x2 days, clear liquid x1 day prior to procedure  covid test 5/7 [employer], instructions emailed-KPvt  5/6 pt confirmed appt-KPvt    DILATION AND CURETTAGE OF UTERUS      ESOPHAGEAL MANOMETRY WITH MEASUREMENT OF IMPEDANCE N/A 03/04/2020    Procedure: MANOMETRY, ESOPHAGUS, WITH IMPEDANCE MEASUREMENT;  Surgeon: Annamaria Mendoza MD;  Location: [facility] (4TH FLR);  Service: Endoscopy;  Laterality: N/A;  OFF PPI/H2 Blocker   Motility Studies   Hold Narcotics x 1 days   Hold TCA x 1 days    ESOPHAGOGASTRODUODENOSCOPY      ESOPHAGOGASTRODUODENOSCOPY N/A 03/29/2019    Procedure: EGD (ESOPHAGOGASTRODUODENOSCOPY);  Surgeon: Annamaria Mendoza MD;  Location: [facility] ENDO (2ND FLR);  Service: Endoscopy;  Laterality: N/A;  pulmonary htn    ESOPHAGOGASTRODUODENOSCOPY N/A 02/02/2021    Procedure: ESOPHAGOGASTRODUODENOSCOPY (EGD);  Surgeon: Annamaria Mendoza MD;  Location: [facility] ENDO (2ND FLR);  Service: Endoscopy;  Laterality: N/A;  2nd floor gastroparesis  3 days full liquid diet and 1 day clears  covid test 1/30 primary care,  instructions sent to [facility]    ESOPHAGOGASTRODUODENOSCOPY N/A 07/01/2022    Procedure: ESOPHAGOGASTRODUODENOSCOPY (EGD);  Surgeon: Annamaria Mendoza MD;  Location: [facility] (2ND FLR);  Service: Endoscopy;  Laterality: N/A;  2nd floor-gastroparesis  full liquid diet x3 days, clear liquid diet x1 day prior to procedure  fully vaccinated, instructions sent to [facility]  6/29-pt confirmed new arrival time-[facility]    EXCISION, LESION, STOMACH, LAPAROSCOPIC N/A 03/23/2023    Procedure: XI ROBOTIC EXCISION, LESION, STOMACH;  Surgeon: Katherine Segura MD;  Location: [address];  Service: General;  Laterality: N/A;    EXCISIONAL BIOPSY, LYMPH NODE Right 05/26/2023    Procedure: EXCISIONAL BIOPSY, LYMPH NODE, INGUINAL;  Surgeon: Katherine Segura MD;  Location: [address];  Service: General;  Laterality: Right;    HYSTERECTOMY  1990    FRANDY (AUB, Fibroids), ovaries remain    REPAIR, HERNIA, UMBILICAL N/A 03/23/2023    Procedure: REPAIR, HERNIA, UMBILICAL;  Surgeon: Katherine Segura MD;  Location: [address];  Service: General;  Laterality: N/A;    RIGHT HEART CATHETERIZATION Right 01/05/2021    Procedure: INSERTION, CATHETER, RIGHT HEART;  Surgeon: Aureliano Sy MD;  Location: [facility] CATH LAB;  Service: Cardiology;  Laterality: Right;    RIGHT HEART CATHETERIZATION Right 03/16/2023    Procedure: INSERTION, CATHETER, RIGHT HEART;  Surgeon: Aureliano Sy MD;  Location: [facility] CATH LAB;  Service: Cardiology;  Laterality: Right;    ROBOT-ASSISTED LAPAROSCOPIC REPAIR OF INGUINAL HERNIA USING DA VERNELL XI Right 05/26/2023    Procedure: XI ROBOTIC REPAIR, HERNIA, INGUINAL, FEMORAL;  Surgeon: Katherine Segura MD;  Location: [address];  Service: General;  Laterality: Right;    ROBOT-ASSISTED REPAIR OF HIATAL HERNIA USING DA VERNELL XI N/A 03/23/2023    Procedure: XI ROBOTIC REPAIR, HERNIA, HIATAL;  Surgeon: Katherine Segura MD;  Location: [address]  FLR;  Service: General;  Laterality: N/A;    VARICOSE VEIN SURGERY      XI ROBOTIC GASTROPEXY N/A 2023    Procedure: XI ROBOTIC GASTROPEXY;  Surgeon: Katherine Segura MD;  Location: [facility] OR 2ND FLR;  Service: General;  Laterality: N/A;    XI ROBOTIC PYLOROMYOTOMY N/A 2023    Procedure: XI ROBOTIC PYLOROMYOTOMY;  Surgeon: Katherine Segura MD;  Location: [facility] OR 2ND FLR;  Service: General;  Laterality: N/A;        Objective:     Vitals  Wt Readings from Last 1 Encounters:   10/04/24 65.3 kg (143 lb 15.4 oz)     Temp Readings from Last 1 Encounters:   10/04/24 98 °F (36.7 °C)     BP Readings from Last 1 Encounters:   10/04/24 138/72     Pulse Readings from Last 1 Encounters:   10/04/24 68       Dermatological Exam    Skin:  Skin of both feet thickened (scleroderma), stiff, with extensive ulcerations of both feet down to fat        Nails:  10 nail(s) elongated, 10 nail(s) thickened, and 10 nail(s) discolored    Vascular Exam    Arteries:  Posterior tibial artery palpable on right  Dorsalis pedis artery palpable on right  Posterior tibial artery palpable on left  Dorsalis pedis artery palpable on left    Veins:  Superficial veins unremarkable on right  Superficial veins unremarkable on left    Swellin+ nonpitting on right  1+ nonpitting on left    Neurological Exam    [place] touch test:  6/6 sites sensed, light touch intact     Musculoskeletal Exam    Footwear:  Casual on right  Casual on left    Gait Exam:   Ambulatory Status: Ambulatory  Gait: Normal  Assistive Devices: None    Foot Progression Angle:  Normal on right  Normal on left    Right Lower Extremity Additional Findings:  Right foot and ankle function, strength, and range of motion unremarkable except as noted above.     Left Lower Extremity Additional Findings:  Left foot and ankle function, strength, and range of motion unremarkable except as noted above.    Imaging and Other Tests    Imaging:  Independently reviewed and  interpreted imaging, findings are as follows: N/A     Assessment:     Encounter Diagnoses   Name Primary?    Cellulitis of lower extremity, unspecified laterality Yes    Ulcers of both lower extremities with fat layer exposed     Idiopathic neuropathy           Plan:     I counseled the patient on her conditions, their implications and medical management.    Neuropathy: chronic stable  -Continue duloxetine.     Bilateral ulcers, cellulitis, swelling: chronic stable  -Continue doxycycline + levofloxacin.   -Dressings changed.    -Protected WB in surgical shoes.     Return to clinic in 1 week, call sooner PRN.

## 2024-10-11 ENCOUNTER — PATIENT MESSAGE (OUTPATIENT)
Dept: ADMINISTRATIVE | Facility: OTHER | Age: 74
End: 2024-10-11
Payer: MEDICARE

## 2024-10-11 ENCOUNTER — OFFICE VISIT (OUTPATIENT)
Dept: PODIATRY | Facility: CLINIC | Age: 74
End: 2024-10-11
Payer: MEDICARE

## 2024-10-11 VITALS
DIASTOLIC BLOOD PRESSURE: 73 MMHG | BODY MASS INDEX: 23.98 KG/M2 | SYSTOLIC BLOOD PRESSURE: 130 MMHG | TEMPERATURE: 99 F | HEART RATE: 69 BPM | HEIGHT: 65 IN | WEIGHT: 143.94 LBS

## 2024-10-11 DIAGNOSIS — L97.922 ULCERS OF BOTH LOWER EXTREMITIES WITH FAT LAYER EXPOSED: ICD-10-CM

## 2024-10-11 DIAGNOSIS — L97.912 ULCERS OF BOTH LOWER EXTREMITIES WITH FAT LAYER EXPOSED: ICD-10-CM

## 2024-10-11 DIAGNOSIS — L03.119 CELLULITIS OF LOWER EXTREMITY, UNSPECIFIED LATERALITY: Primary | ICD-10-CM

## 2024-10-11 PROCEDURE — 99999 PR PBB SHADOW E&M-EST. PATIENT-LVL IV: CPT | Mod: PBBFAC,,, | Performed by: STUDENT IN AN ORGANIZED HEALTH CARE EDUCATION/TRAINING PROGRAM

## 2024-10-11 PROCEDURE — 99214 OFFICE O/P EST MOD 30 MIN: CPT | Mod: PBBFAC | Performed by: STUDENT IN AN ORGANIZED HEALTH CARE EDUCATION/TRAINING PROGRAM

## 2024-10-11 NOTE — PROGRESS NOTES
Subjective:     Patient    Yamel Shah is a 73 y.o. female.    Problem    09/25/24: Seen by Dr Morton in 2021 for chronic bilateral foot wounds. Has been caring for them herself for the past few years because she felt she never made much progress with podiatry or wound care. Now presents with chronic bilateral foot wounds, burning, stinging which can sometimes keep her up at night. Previously tried amitriptyline but it cause headaches and nightmares.      10/04/24: Returns for chronic bilateral foot wounds, no new issues. On antibiotics.     10/11/24: Returns for chronic bilateral foot wounds. Had improved pain with duloxetine. Prescribed doxycycline + Levofloxacin but only taking the Levofloxacin.     History    History obtained from patient and review of medical records.     Past Medical History:   Diagnosis Date    Abnormal Pap smear     Acid reflux     Allergy     Arthritis     Encounter for blood transfusion     GERD (gastroesophageal reflux disease)     Hiatal hernia 03/24/2023    History of migraine headaches     Hx of colonic polyp     Hypertension     Idiopathic neuropathy 07/20/2012    ILD (interstitial lung disease) 11/06/2013    Iron deficiency anemia 03/18/2014    MRSA carrier     Osteopenia     Pneumonia     Pulmonary fibrosis     Pulmonary hypertension     Raynaud's disease     Scleroderma, diffuse     Sjogren's syndrome     Vitamin D deficiency 11/14/2013       Past Surgical History:   Procedure Laterality Date    24 HOUR IMPEDANCE PH MONITORING OF ESOPHAGUS IN PATIENT NOT TAKING ACID REDUCING MEDICATIONS N/A 03/04/2020    Procedure: IMPEDANCE PH STUDY, ESOPHAGEAL, 24 HOUR, IN PATIENT NOT TAKING ACID REDUCING MEDICATION;  Surgeon: Annamaria Mendoza MD;  Location: [facility] ([address] [place] [address]);  Service: Endoscopy;  Laterality: N/A;  OFF PPI/H2 Blocker   Motility Studies   Hold Narcotics x 1 days   Hold TCA x 1 days  2/26 - LVM attempting to confirm appt  2/27 - Confirmed appt    ANORECTAL  MANOMETRY N/A 05/10/2021    Procedure: MANOMETRY, ANORECTAL with balloon expulsion test;  Surgeon: Annamaria Mendoza MD;  Location: [facility] ENDO (4TH FLR);  Service: Endoscopy;  Laterality: N/A;  order combined  covid test 5/7 [employer]-ism, instructions emailed-KPvt    BREAST BIOPSY      Left, benign    BRONCHOSCOPY N/A 10/2/2023    Procedure: bronch;  Surgeon: Roberta Leigh DO;  Location: [facility] OR 2ND FLR;  Service: Endoscopy;  Laterality: N/A;    BRONCHOSCOPY N/A 9/16/2024    Procedure: Flexible bronchoscopy (BAL only);  Surgeon: Roberta Leigh DO;  Location: [facility] OR 2ND FLR;  Service: Endoscopy;  Laterality: N/A;    CERVICAL CONIZATION   W/ LASER  1970    COLONOSCOPY      COLONOSCOPY N/A 03/29/2019    Procedure: COLONOSCOPY;  Surgeon: Annamaria Mendoza MD;  Location: [facility] ENDO (2ND FLR);  Service: Endoscopy;  Laterality: N/A;    COLONOSCOPY N/A 05/10/2021    Procedure: COLONOSCOPY;  Surgeon: Annamaria Mendoza MD;  Location: [facility] ENDO (4TH FLR);  Service: Endoscopy;  Laterality: N/A;  2nd floor-previous scopes done on 2nd floor, gastroparesis  full liquid diet x2 days, clear liquid x1 day prior to procedure  covid test 5/7 Mu-ism, instructions emailed-KPvt  5/6 pt confirmed appt-KPvt    DILATION AND CURETTAGE OF UTERUS      ESOPHAGEAL MANOMETRY WITH MEASUREMENT OF IMPEDANCE N/A 03/04/2020    Procedure: MANOMETRY, ESOPHAGUS, WITH IMPEDANCE MEASUREMENT;  Surgeon: Annamaria Mendoza MD;  Location: [facility] ENDO (4TH FLR);  Service: Endoscopy;  Laterality: N/A;  OFF PPI/H2 Blocker   Motility Studies   Hold Narcotics x 1 days   Hold TCA x 1 days    ESOPHAGOGASTRODUODENOSCOPY      ESOPHAGOGASTRODUODENOSCOPY N/A 03/29/2019    Procedure: EGD (ESOPHAGOGASTRODUODENOSCOPY);  Surgeon: Annamaria Mendoza MD;  Location: [facility] ENDO (2ND FLR);  Service: Endoscopy;  Laterality: N/A;  pulmonary htn    ESOPHAGOGASTRODUODENOSCOPY N/A 02/02/2021    Procedure: ESOPHAGOGASTRODUODENOSCOPY (EGD);  Surgeon: Annamaria Mendoza MD;  Location:  [facility] ENDO (2ND FLR);  Service: Endoscopy;  Laterality: N/A;  2nd floor gastroparesis  3 days full liquid diet and 1 day clears  covid test 1/30 primary care, instructions sent to [facility]    ESOPHAGOGASTRODUODENOSCOPY N/A 07/01/2022    Procedure: ESOPHAGOGASTRODUODENOSCOPY (EGD);  Surgeon: Annamaria Mendoza MD;  Location: [facility] ENDO (2ND FLR);  Service: Endoscopy;  Laterality: N/A;  2nd floor-gastroparesis  full liquid diet x3 days, clear liquid diet x1 day prior to procedure  fully vaccinated, instructions sent to [facility]  6/29-pt confirmed new arrival time-[facility]    EXCISION, LESION, STOMACH, LAPAROSCOPIC N/A 03/23/2023    Procedure: XI ROBOTIC EXCISION, LESION, STOMACH;  Surgeon: Katherine Segura MD;  Location: [address];  Service: General;  Laterality: N/A;    EXCISIONAL BIOPSY, LYMPH NODE Right 05/26/2023    Procedure: EXCISIONAL BIOPSY, LYMPH NODE, INGUINAL;  Surgeon: Katherine Segura MD;  Location: [address];  Service: General;  Laterality: Right;    HYSTERECTOMY  1990    FRANDY (AUB, Fibroids), ovaries remain    REPAIR, HERNIA, UMBILICAL N/A 03/23/2023    Procedure: REPAIR, HERNIA, UMBILICAL;  Surgeon: Katherine Segura MD;  Location: [address];  Service: General;  Laterality: N/A;    RIGHT HEART CATHETERIZATION Right 01/05/2021    Procedure: INSERTION, CATHETER, RIGHT HEART;  Surgeon: Aureliano Sy MD;  Location: [facility] CATH LAB;  Service: Cardiology;  Laterality: Right;    RIGHT HEART CATHETERIZATION Right 03/16/2023    Procedure: INSERTION, CATHETER, RIGHT HEART;  Surgeon: Aureliano Sy MD;  Location: [facility] CATH LAB;  Service: Cardiology;  Laterality: Right;    ROBOT-ASSISTED LAPAROSCOPIC REPAIR OF INGUINAL HERNIA USING DA VERNELL XI Right 05/26/2023    Procedure: XI ROBOTIC REPAIR, HERNIA, INGUINAL, FEMORAL;  Surgeon: Katherine Segura MD;  Location: [address];  Service: General;  Laterality: Right;    ROBOT-ASSISTED REPAIR OF  HIATAL HERNIA USING DA VERNELL XI N/A 2023    Procedure: XI ROBOTIC REPAIR, HERNIA, HIATAL;  Surgeon: Katherine Segura MD;  [facility];  Service: General;  Laterality: N/A;    VARICOSE VEIN SURGERY      XI ROBOTIC GASTROPEXY N/A 2023    Procedure: XI ROBOTIC GASTROPEXY;  Surgeon: Katherine Segura MD;  [facility];  Service: General;  Laterality: N/A;    XI ROBOTIC PYLOROMYOTOMY N/A 2023    Procedure: XI ROBOTIC PYLOROMYOTOMY;  Surgeon: Katherine Segura MD;  [facility];  Service: General;  Laterality: N/A;        Objective:     Vitals  Wt Readings from Last 1 Encounters:   10/11/24 65.3 kg (143 lb 15.4 oz)     Temp Readings from Last 1 Encounters:   10/11/24 98.6 °F (37 °C) (Oral)     BP Readings from Last 1 Encounters:   10/11/24 130/73     Pulse Readings from Last 1 Encounters:   10/11/24 69       Dermatological Exam    Skin:  Skin of both feet thickened (scleroderma), stiff, with extensive ulcerations of both feet down to fat        Nails:  10 nail(s) elongated, 10 nail(s) thickened, and 10 nail(s) discolored    Vascular Exam    Arteries:  Posterior tibial artery palpable on right  Dorsalis pedis artery palpable on right  Posterior tibial artery palpable on left  Dorsalis pedis artery palpable on left    Veins:  Superficial veins unremarkable on right  Superficial veins unremarkable on left    Swellin+ nonpitting on right  1+ nonpitting on left    Neurological Exam    [place] touch test:  6/6 sites sensed, light touch intact     Musculoskeletal Exam    Footwear:  Casual on right  Casual on left    Gait Exam:   Ambulatory Status: Ambulatory  Gait: Normal  Assistive Devices: None    Foot Progression Angle:  Normal on right  Normal on left    Right Lower Extremity Additional Findings:  Right foot and ankle function, strength, and range of motion unremarkable except as noted above.     Left Lower Extremity Additional  Findings:  Left foot and ankle function, strength, and range of motion unremarkable except as noted above.    Imaging and Other Tests    Imaging:  Independently reviewed and interpreted imaging, findings are as follows: N/A     Assessment:     Encounter Diagnoses   Name Primary?    Cellulitis of lower extremity, unspecified laterality Yes    Ulcers of both lower extremities with fat layer exposed             Plan:     I counseled the patient on her conditions, their implications and medical management.    Neuropathy: chronic stable  -Continue duloxetine.     Bilateral ulcers, cellulitis, swelling: chronic stable  -Continue Levofloxacin.   -Dressings changed.    -Protected WB in surgical shoes.     Return to clinic in 1 week, call sooner PRN.

## 2024-10-14 ENCOUNTER — LAB VISIT (OUTPATIENT)
Dept: LAB | Facility: HOSPITAL | Age: 74
End: 2024-10-14
Payer: MEDICARE

## 2024-10-14 ENCOUNTER — PATIENT MESSAGE (OUTPATIENT)
Dept: TRANSPLANT | Facility: CLINIC | Age: 74
End: 2024-10-14
Payer: MEDICARE

## 2024-10-14 ENCOUNTER — TELEPHONE (OUTPATIENT)
Dept: PODIATRY | Facility: CLINIC | Age: 74
End: 2024-10-14
Payer: MEDICARE

## 2024-10-14 DIAGNOSIS — Z79.899 IMMUNOSUPPRESSION DUE TO DRUG THERAPY: ICD-10-CM

## 2024-10-14 DIAGNOSIS — D84.821 IMMUNOSUPPRESSION DUE TO DRUG THERAPY: ICD-10-CM

## 2024-10-14 DIAGNOSIS — L03.119 CELLULITIS OF LOWER EXTREMITY, UNSPECIFIED LATERALITY: ICD-10-CM

## 2024-10-14 DIAGNOSIS — I10 ESSENTIAL HYPERTENSION: ICD-10-CM

## 2024-10-14 DIAGNOSIS — J84.9 ILD (INTERSTITIAL LUNG DISEASE): ICD-10-CM

## 2024-10-14 DIAGNOSIS — M34.9 SCLERODERMA WITH PULMONARY INVOLVEMENT: ICD-10-CM

## 2024-10-14 DIAGNOSIS — J84.9 INTERSTITIAL PULMONARY DISEASE, UNSPECIFIED: Primary | ICD-10-CM

## 2024-10-14 DIAGNOSIS — L97.529 SKIN ULCERS OF BOTH FEET: ICD-10-CM

## 2024-10-14 DIAGNOSIS — L97.519 SKIN ULCERS OF BOTH FEET: ICD-10-CM

## 2024-10-14 LAB
ALBUMIN SERPL BCP-MCNC: 3.4 G/DL (ref 3.5–5.2)
ALP SERPL-CCNC: 86 U/L (ref 55–135)
ALT SERPL W/O P-5'-P-CCNC: 11 U/L (ref 10–44)
ANION GAP SERPL CALC-SCNC: 8 MMOL/L (ref 8–16)
AST SERPL-CCNC: 19 U/L (ref 10–40)
BASOPHILS # BLD AUTO: 0.03 K/UL (ref 0–0.2)
BASOPHILS NFR BLD: 0.5 % (ref 0–1.9)
BILIRUB SERPL-MCNC: 0.4 MG/DL (ref 0.1–1)
BUN SERPL-MCNC: 23 MG/DL (ref 8–23)
CALCIUM SERPL-MCNC: 9.2 MG/DL (ref 8.7–10.5)
CHLORIDE SERPL-SCNC: 105 MMOL/L (ref 95–110)
CHOLEST SERPL-MCNC: 116 MG/DL (ref 120–199)
CHOLEST/HDLC SERPL: 2.6 {RATIO} (ref 2–5)
CO2 SERPL-SCNC: 25 MMOL/L (ref 23–29)
CREAT SERPL-MCNC: 0.9 MG/DL (ref 0.5–1.4)
DIFFERENTIAL METHOD BLD: ABNORMAL
EOSINOPHIL # BLD AUTO: 0 K/UL (ref 0–0.5)
EOSINOPHIL NFR BLD: 0.4 % (ref 0–8)
ERYTHROCYTE [DISTWIDTH] IN BLOOD BY AUTOMATED COUNT: 14.1 % (ref 11.5–14.5)
EST. GFR  (NO RACE VARIABLE): >60 ML/MIN/1.73 M^2
ESTIMATED AVG GLUCOSE: 97 MG/DL (ref 68–131)
GLUCOSE SERPL-MCNC: 80 MG/DL (ref 70–110)
HBA1C MFR BLD: 5 % (ref 4–5.6)
HCT VFR BLD AUTO: 39.3 % (ref 37–48.5)
HDLC SERPL-MCNC: 44 MG/DL (ref 40–75)
HDLC SERPL: 37.9 % (ref 20–50)
HGB BLD-MCNC: 13.4 G/DL (ref 12–16)
IMM GRANULOCYTES # BLD AUTO: 0.01 K/UL (ref 0–0.04)
IMM GRANULOCYTES NFR BLD AUTO: 0.2 % (ref 0–0.5)
LDLC SERPL CALC-MCNC: 56.2 MG/DL (ref 63–159)
LYMPHOCYTES # BLD AUTO: 1.1 K/UL (ref 1–4.8)
LYMPHOCYTES NFR BLD: 18.7 % (ref 18–48)
MCH RBC QN AUTO: 33.9 PG (ref 27–31)
MCHC RBC AUTO-ENTMCNC: 34.1 G/DL (ref 32–36)
MCV RBC AUTO: 100 FL (ref 82–98)
MONOCYTES # BLD AUTO: 0.3 K/UL (ref 0.3–1)
MONOCYTES NFR BLD: 5.8 % (ref 4–15)
NEUTROPHILS # BLD AUTO: 4.2 K/UL (ref 1.8–7.7)
NEUTROPHILS NFR BLD: 74.4 % (ref 38–73)
NONHDLC SERPL-MCNC: 72 MG/DL
NRBC BLD-RTO: 0 /100 WBC
PLATELET # BLD AUTO: 134 K/UL (ref 150–450)
PMV BLD AUTO: 12.5 FL (ref 9.2–12.9)
POTASSIUM SERPL-SCNC: 4.2 MMOL/L (ref 3.5–5.1)
PROT SERPL-MCNC: 8.4 G/DL (ref 6–8.4)
RBC # BLD AUTO: 3.95 M/UL (ref 4–5.4)
SODIUM SERPL-SCNC: 138 MMOL/L (ref 136–145)
TRIGL SERPL-MCNC: 79 MG/DL (ref 30–150)
TSH SERPL DL<=0.005 MIU/L-ACNC: 1.93 UIU/ML (ref 0.4–4)
WBC # BLD AUTO: 5.68 K/UL (ref 3.9–12.7)

## 2024-10-14 PROCEDURE — 84443 ASSAY THYROID STIM HORMONE: CPT | Mod: TXP | Performed by: INTERNAL MEDICINE

## 2024-10-14 PROCEDURE — 85025 COMPLETE CBC W/AUTO DIFF WBC: CPT | Mod: TXP | Performed by: INTERNAL MEDICINE

## 2024-10-14 PROCEDURE — 80053 COMPREHEN METABOLIC PANEL: CPT | Mod: TXP | Performed by: INTERNAL MEDICINE

## 2024-10-14 PROCEDURE — 80061 LIPID PANEL: CPT | Mod: TXP | Performed by: INTERNAL MEDICINE

## 2024-10-14 PROCEDURE — 83036 HEMOGLOBIN GLYCOSYLATED A1C: CPT | Mod: TXP | Performed by: INTERNAL MEDICINE

## 2024-10-14 RX ORDER — AMOXICILLIN AND CLAVULANATE POTASSIUM 250; 62.5 MG/5ML; MG/5ML
250 POWDER, FOR SUSPENSION ORAL DAILY
Qty: 100 ML | Refills: 0 | Status: SHIPPED | OUTPATIENT
Start: 2024-10-14

## 2024-10-14 RX ORDER — LEVOFLOXACIN 25 MG/ML
500 SOLUTION ORAL DAILY
Qty: 200 ML | Refills: 0 | Status: SHIPPED | OUTPATIENT
Start: 2024-10-14

## 2024-10-14 NOTE — TELEPHONE ENCOUNTER
----- Message from Med Assistant Ferrera sent at 10/11/2024  3:29 PM CDT -----  Regarding: FW: Pt called [facility] like to know if she should continue to take Below medication  Contact: [phone]  Please see refill request. Thanks.  ----- Message -----  From: Dinorah Pelaez  Sent: 10/11/2024   3:22 PM CDT  To: [facility] Staff  Subject: Pt called [facility] like to know if she should con#    Name of Who is Calling:DANA GARCÍA [[id number]]        What is the request in detail:Pt called [facility] like to know if she should continue to take Below medication. States only has 2 doses left and [facility] need a refill if so. Please advise     levoFLOXacin (LEVAQUIN) 250 mg/10 mL Soln        Can the clinic reply by [facility]:No        What Number to Call Back if not in [facility]: Telephone Information:  Mobile          [phone]

## 2024-10-21 ENCOUNTER — OFFICE VISIT (OUTPATIENT)
Dept: INTERNAL MEDICINE | Facility: CLINIC | Age: 74
End: 2024-10-21
Payer: MEDICARE

## 2024-10-21 VITALS
HEART RATE: 91 BPM | BODY MASS INDEX: 23.91 KG/M2 | HEIGHT: 65 IN | OXYGEN SATURATION: 97 % | DIASTOLIC BLOOD PRESSURE: 60 MMHG | SYSTOLIC BLOOD PRESSURE: 124 MMHG | RESPIRATION RATE: 13 BRPM | WEIGHT: 143.5 LBS | TEMPERATURE: 99 F

## 2024-10-21 DIAGNOSIS — I10 ESSENTIAL HYPERTENSION: Primary | ICD-10-CM

## 2024-10-21 DIAGNOSIS — E78.5 HYPERLIPIDEMIA, UNSPECIFIED HYPERLIPIDEMIA TYPE: ICD-10-CM

## 2024-10-21 DIAGNOSIS — L97.529 SKIN ULCERS OF BOTH FEET: ICD-10-CM

## 2024-10-21 DIAGNOSIS — Z79.899 IMMUNOSUPPRESSION DUE TO DRUG THERAPY: ICD-10-CM

## 2024-10-21 DIAGNOSIS — D84.821 IMMUNOSUPPRESSION DUE TO DRUG THERAPY: ICD-10-CM

## 2024-10-21 DIAGNOSIS — L97.519 SKIN ULCERS OF BOTH FEET: ICD-10-CM

## 2024-10-21 DIAGNOSIS — R22.41 MASS OF RIGHT THIGH: ICD-10-CM

## 2024-10-21 DIAGNOSIS — R79.9 ABNORMAL FINDING OF BLOOD CHEMISTRY, UNSPECIFIED: ICD-10-CM

## 2024-10-21 DIAGNOSIS — M34.9 SCLERODERMA WITH PULMONARY INVOLVEMENT: ICD-10-CM

## 2024-10-21 DIAGNOSIS — M34.9 SCLERODERMA: ICD-10-CM

## 2024-10-21 PROCEDURE — 99214 OFFICE O/P EST MOD 30 MIN: CPT | Mod: PBBFAC,PO | Performed by: INTERNAL MEDICINE

## 2024-10-21 PROCEDURE — 99999 PR PBB SHADOW E&M-EST. PATIENT-LVL IV: CPT | Mod: PBBFAC,,, | Performed by: INTERNAL MEDICINE

## 2024-10-21 PROCEDURE — 99215 OFFICE O/P EST HI 40 MIN: CPT | Mod: S$PBB,,, | Performed by: INTERNAL MEDICINE

## 2024-10-21 RX ORDER — ACETAMINOPHEN AND CODEINE PHOSPHATE 300; 60 MG/1; MG/1
1 TABLET ORAL NIGHTLY
Qty: 30 TABLET | Refills: 0 | Status: SHIPPED | OUTPATIENT
Start: 2024-10-21 | End: 2024-11-20

## 2024-10-21 NOTE — TELEPHONE ENCOUNTER
Patient requesting refill on acetaminophen-codeine 300-60mg (TYLENOL #4) 300-60 mg Tab   Last office visit 07/30/2024   shows last refill on 9/21/24  Patient does not have a pain contract on file with [facility] department  Patient last UDS 07/30/2024 was consistent with current therapy               CODEINE  Present   Not Detected     Comment: INTERPRETIVE INFORMATION: Codeine, U  Positive Cutoff: 40 ng/mL  Methodology: Mass Spectrometry   MORPHINE  Present   Not Detected     Comment: INTERPRETIVE INFORMATION:Morphine, U  Positive Cutoff: 20 ng/mL  Methodology: Mass Spectrometry   6-ACETYLMORPHINE  Not Detected   Not Detected     Comment: INTERPRETIVE INFORMATION:6-acetylmorphine, U  Positive Cutoff: 20 ng/mL  Methodology: Mass Spectrometry   OXYCODONE  Not Detected   Not Detected     Comment: INTERPRETIVE INFORMATION:Oxycodone, U  Positive Cutoff: 40 ng/mL  Methodology: Mass Spectrometry   NOROYXCODONE  Not Detected   Not Detected     Comment: INTERPRETIVE INFORMATION:Noroxycodone, U  Positive Cutoff: 100 ng/mL  Methodology: Mass Spectrometry   OXYMORPHONE  Not Detected   Not Detected     Comment: INTERPRETIVE INFORMATION:Oxymorphone, U  Positive Cutoff: 40 ng/mL  Methodology: Mass Spectrometry   NOROXYMORPHONE  Not Detected   Not Detected     Comment: INTERPRETIVE INFORMATION:Noroxymorphone, U  Positive Cutoff: 100 ng/mL  Methodology: Mass Spectrometry   HYDROCODONE  Not Detected   Not Detected     Comment: INTERPRETIVE INFORMATION:Hydrocodone, U  Positive Cutoff: 40 ng/mL  Methodology: Mass Spectrometry   NORHYDROCODONE  Not Detected   Not Detected     Comment: INTERPRETIVE INFORMATION:Norhydrocodone, U  Positive Cutoff: 100 ng/mL  Methodology: Mass Spectrometry   HYDROMORPHONE  Not Detected   Not Detected     Comment: INTERPRETIVE INFORMATION:Hydromorphone, U  Positive Cutoff: 20 ng/mL  Methodology: Mass Spectrometry   BUPRENORPHINE  Not Detected   Not Detected     Comment: INTERPRETIVE  INFORMATION:Buprenorphine, U  Positive Cutoff: 5 ng/mL  Methodology: Mass Spectrometry   NORUBPRENORPHINE  Not Detected   Not Detected     Comment: INTERPRETIVE INFORMATION:Norbuprenorphine, U  Positive Cutoff: 20 ng/mL  Methodology: Mass Spectrometry   FENTANYL  Not Detected   Not Detected     Comment: INTERPRETIVE INFORMATION:Fentanyl, U  Positive Cutoff: 2 ng/mL  Methodology: Mass Spectrometry   NORFENTANYL  Not Detected   Not Detected     Comment: INTERPRETIVE INFORMATION:Norfentanyl, U  Positive Cutoff: 2 ng/mL  Methodology: Mass Spectrometry   MEPERIDINE METABOLITE  Not Detected   Not Detected     Comment: INTERPRETIVE INFORMATION:Meperidine metabolite, U  Positive Cutoff: 50 ng/mL  Methodology: Mass Spectrometry   TAPENTADOL  Not Detected   Not Detected     Comment: INTERPRETIVE INFORMATION:Tapentadol, U  Positive Cutoff: 100 ng/mL  Methodology: Mass Spectrometry   TAPENTADOL-O-SULF  Not Detected   Not Detected     Comment: INTERPRETIVE INFORMATION:Tapentadol-o-Sulf, U  Positive Cutoff: 200 ng/mL  Methodology: Mass Spectrometry   METHADONE  Negative   Not Detected     Comment: Presumptive negative by immunoassay. Testing by mass  spectrometry is available on request.  INTERPRETIVE INFORMATION: Methadone Screen, U  Positive Cutoff: 150 ng/mL  Methodology: Immunoassay   TRAMADOL  Negative   Not Detected     Comment: Presumptive negative by immunoassay. Testing by mass  spectrometry is available on request.  INTERPRETIVE INFORMATION:Tramadol Screen, U  Positive Cutoff: 100 ng/mL  Methodology: Immunoassay   AMPHETAMINE  Not Detected   Not Detected     Comment: INTERPRETIVE INFORMATION:Amphetamine, U  Positive Cutoff: 50 ng/mL  Methodology: Mass Spectrometry   METHAMPHETAMINE  Not Detected   Not Detected     Comment: INTERPRETIVE INFORMATION:Methamphetamine, U  Positive Cutoff: 200 ng/mL  Methodology: Mass Spectrometry   MDMA- ECSTASY  Not Detected   Not Detected     Comment: INTERPRETIVE INFORMATION:MDMA,  U  Positive Cutoff: 200 ng/mL  Methodology: Mass Spectrometry   MDA  Not Detected   Not Detected     Comment: INTERPRETIVE INFORMATION:MDA, U  Positive Cutoff: 200 ng/mL  Methodology: Mass Spectrometry   MDEA- Mickie  Not Detected   Not Detected     Comment: INTERPRETIVE INFORMATION:MDEA, U  Positive Cutoff: 200 ng/mL  Methodology: Mass Spectrometry   METHYLPHENIDATE  Not Detected   Not Detected     Comment: INTERPRETIVE INFORMATION:Methylphenidate, U  Positive Cutoff: 100 ng/mL  Methodology: Mass Spectrometry   PHENTERMINE  Not Detected   Not Detected     Comment: INTERPRETIVE INFORMATION:Phentermine, U  Positive Cutoff: 100 ng/mL  Methodology: Mass Spectrometry   BENZOYLECGONINE  Negative   Not Detected     Comment: Presumptive negative by immunoassay. Testing by mass  spectrometry is available on request.  INTERPRETIVE INFORMATION:Cocaine Screen, U  Positive Cutoff: 150 ng/mL  Methodology: Immunoassay   ALPRAZOLAM  Not Detected   Not Detected     Comment: INTERPRETIVE INFORMATION:Alprazolam, U  Positive Cutoff: 40 ng/mL  Methodology: Mass Spectrometry   ALPHA-OH-ALPRAZOLAM  Not Detected   Not Detected     Comment: INTERPRETIVE INFORMATION:Alpha-OH-Alprazolam, U  Positive Cutoff: 20 ng/mL  Methodology: Mass Spectrometry   CLONAZEPAM  Not Detected   Not Detected     Comment: INTERPRETIVE INFORMATION:Clonazepam, U  Positive Cutoff: 20 ng/mL  Methodology: Mass Spectrometry   7-AMINOCLONAZEPAM  Not Detected   Not Detected     Comment: INTERPRETIVE INFORMATION:7-Aminoclonazepam, U  Positive Cutoff: 40 ng/mL  Methodology: Mass Spectrometry   DIAZEPAM  Not Detected   Not Detected     Comment: INTERPRETIVE INFORMATION:Diazepam, U  Positive Cutoff: 50 ng/mL  Methodology: Mass Spectrometry   NORDIAZEPAM  Not Detected   Not Detected     Comment: INTERPRETIVE INFORMATION:Nordiazepam, U  Positive Cutoff: 50 ng/mL  Methodology: Mass Spectrometry   OXAZEPAM  Not Detected   Not Detected     Comment: INTERPRETIVE  INFORMATION:Oxazepam, U  Positive Cutoff: 50 ng/mL  Methodology: Mass Spectrometry   TEMAZEPAM  Not Detected   Not Detected     Comment: INTERPRETIVE INFORMATION:Temazepam, U  Positive Cutoff: 50 ng/mL  Methodology: Mass Spectrometry   Lorazepam  Not Detected   Not Detected     Comment: INTERPRETIVE INFORMATION:Lorazepam, U  Positive Cutoff: 60 ng/mL  Methodology: Mass Spectrometry   MIDAZOLAM  Not Detected   Not Detected     Comment: INTERPRETIVE INFORMATION:Midazolam, U  Positive Cutoff: 20 ng/mL  Methodology: Mass Spectrometry   ZOLPIDEM  Not Detected   Not Detected     Comment: INTERPRETIVE INFORMATION:Zolpidem, U  Positive Cutoff: 20 ng/mL  Methodology: Mass Spectrometry   BARBITURATES  Negative   Not Detected     Comment: Presumptive negative by immunoassay. Testing by mass  spectrometry is available on request.  INTERPRETIVE INFORMATION:Barbiturates Screen, U  Positive Cutoff: 200 ng/mL  Methodology: Immunoassay   Creatinine, Urine 20.0 - 400.0 mg/dL 112.0 47.0 R 103.0 R 344.5 139.0 R,  R   ETHYL GLUCURONIDE  Negative   Present     Comment: Presumptive negative by immunoassay. Testing by mass  spectrometry is available on request.  INTERPRETIVE INFORMATION:Ethyl Glucuronide Screen, U  Positive Cutoff: 500 ng/mL  Methodology: Immunoassay   MARIJUANA METABOLITE  Negative   Not Detected     Comment: Presumptive negative by immunoassay. Testing by mass  spectrometry is available on request.  INTERPRETIVE INFORMATION: THC (Cannabinoids) Screen, U  Positive Cutoff: 50 ng/mL  Methodology: Immunoassay   PCP  Negative   Not Detected     Comment: Presumptive negative by immunoassay. Testing by mass  spectrometry is available on request.  INTERPRETIVE INFORMATION:Phencyclidine Screen, U  Positive Cutoff: 25 ng/mL  Methodology: Immunoassay   CARISOPRODOL  Negative   Not Detected CM     Comment: Presumptive negative by immunoassay. Testing by mass  spectrometry is available on request.  INTERPRETIVE INFORMATION:  Carisoprodol Screen, U  Positive Cutoff: 100 ng/mL  Methodology: Immunoassay  The carisoprodol immunoassay has cross-reactivity to  carisoprodol and meprobamate.   Naloxone  Not Detected   Not Detected     Comment: INTERPRETIVE INFORMATION:Naloxone, U  Positive Cutoff: 100 ng/mL  Methodology: Mass Spectrometry   Gabapentin  Not Detected   Not Detected     Comment: INTERPRETIVE INFORMATION:Gabapentin, U  Positive Cutoff: 3,000 ng/mL  Methodology: Mass Spectrometry   Pregabalin  Present   Present     Comment: INTERPRETIVE INFORMATION:Pregabalin, U  Positive Cutoff: 3,000 ng/mL  Methodology: Mass Spectrometry   Alpha-OH-Midazolam  Not Detected   Not Detected     Comment: INTERPRETIVE INFORMATION:Alpha-OH-Midazolam, U  Positive Cutoff: 20 ng/mL  Methodology: Mass Spectrometry   Zolpidem Metabolite  Not Detected   Not Detected

## 2024-10-21 NOTE — PROGRESS NOTES
Subjective:       Patient ID: Yamel Shah is a 73 y.o. female.    Chief Complaint: Follow-up (6 month)    History of Present Illness      Yamel presents today for follow up of medical conditions which include hypertension, scleroderma, hyperlipidemia, skin ulcers of both feet, immunosuppression due to drug therapy, mass of the right thigh.  The patient reports lower extremity swelling has improved with use of Lasix 20 mg daily as needed for fluid retention.  She has noted a nontender skin mass involving the right medial thigh.  He feels it has been gradually enlarging.  No leg pain is noted.    The patient states she is not currently using any bronchodilator inhalers.  She feels her breathing has been stable.  She is status post bronchoscopy for evaluation of hemoptysis.  Bronchoalveolar lavage was negative for evidence of malignant cells.  The patient states she felt better after the procedure; she felt she was breathing better with little chest congestion.    FOOT INFECTION:  She reports ongoing foot issues and is seeing a podiatrist weekly for treatment. The podiatrist is utilizing a purple bacterial solution to address the infection. A culture has been performed. She is currently taking oral antibiotics, specifically Levaquin (levofloxacin) and amoxicillin. The podiatrist has performed nail trimming on the great toe, which was described as twisted. Other toenails may be trimmed in future appointments.    NEUROPATHIC PAIN:  She reports taking duloxetine for neuropathic pain, experiencing instant relief on the first day of use. She is also taking pregabalin (Lyrica) prescribed by pain management, but notes that duloxetine seems to be more effective. She has an upcoming appointment with pain management next week.    WEIGHT AND APPETITE:  She reports recent weight fluctuations, with an overall loss of 4 lbs since her last visit. Her weight varies day to day, ranging from 140 to 143 lbs, with an  "average of 144-145 lbs. She describes a decrease in appetite compared to previously, stating it is "not as great as it used to be." She denies any upset stomach or other GI symptoms affecting her eating habits.    FLUID RETENTION:  She reports significant reduction in fluid retention, particularly in the ankles and legs. She attributes this improvement to the use of Lasix (furosemide) 20 mg. She notes associated side effects including dry mouth and eyes. She also mentions experiencing weight loss, which she believes may be due to the diuretic effect of the medication.    RESPIRATORY HISTORY:  She reports a history of respiratory issues, including a recent bronchoscopy performed by Dr. Gutierrez prompted by an episode of hemoptysis. No active bleeding or ruptures were identified during the bronchoscopy, but she believes the procedure may have cleared her airways. She also mentions a prior history of pneumonia. She denies current breathing difficulties, indicating her respiratory status is currently stable.    NEW CONCERNS:  She reports intermittent right lung pain that started yesterday, describing it as coming and going. She expresses concern about swelling in the groin area present for over six months and appearing to be getting bigger. She denies any pain associated with the swelling. She has a history of inguinal hernia surgery and mentions that during a previous medical encounter, a doctor had noted this area but stated there was no immediate concern at that time.    MEDICATIONS:  In addition to previously mentioned medications, she reports taking an unspecified medication for acid reflux.    VACCINATIONS:  She received a flu vaccine in September of this year. She is considering getting the latest COVID vaccine, which was approved in September. Her last COVID vaccination was on May 2nd.    LAB RESULTS:  She reports being informed of recent blood test results. All electrolytes, kidney function tests, glucose " levels, and liver function tests were withi  n normal range. Cholesterol levels were noted to be low, with a total cholesterol of 116 and LDL cholesterol of 56.    ROS:  Constitutional: +weight loss, +loss in appetite  ENT: +dry mouth  Respiratory: -difficulty breathing, -cough  Musculoskeletal: -joint pain, -muscle pain              Physical Exam  Vitals and nursing note reviewed.   Constitutional:       General: She is not in acute distress.     Appearance: Normal appearance. She is well-developed.      Comments: The patient has lost 4 lb since 03/26/2024.   HENT:      Head: Normocephalic and atraumatic.   Eyes:      General: No scleral icterus.     Extraocular Movements: Extraocular movements intact.      Conjunctiva/sclera: Conjunctivae normal.   Neck:      Thyroid: No thyromegaly.      Vascular: No JVD.   Cardiovascular:      Rate and Rhythm: Normal rate and regular rhythm.      Heart sounds: Normal heart sounds. No murmur heard.     No friction rub. No gallop.   Pulmonary:      Effort: Pulmonary effort is normal. No respiratory distress.      Breath sounds: Normal breath sounds. No wheezing or rales.   Abdominal:      General: Bowel sounds are normal.      Palpations: Abdomen is soft. There is no mass.      Tenderness: There is no abdominal tenderness.   Musculoskeletal:         General: No tenderness. Normal range of motion.      Cervical back: Normal range of motion and neck supple.      Right lower leg: No edema.      Left lower leg: No edema.   Lymphadenopathy:      Cervical: No cervical adenopathy.   Skin:     General: Skin is warm and dry.      Findings: No rash.      Comments: Feet:  An Unna boot is present on the left foot.  Sterile dressing is present on the right foot.  Images of the patient's foot wounds recorded by her podiatrist were reviewed from the electronic medical record.    Hands:  Sclerodactyly is present bilaterally.  No hand ulcerations are present.   Neurological:      Mental Status:  She is alert and oriented to person, place, and time.   Psychiatric:         Mood and Affect: Mood normal.         Behavior: Behavior normal.           Lab Visit on 10/14/2024   Component Date Value Ref Range Status    Sodium 10/14/2024 138  136 - 145 mmol/L Final    Potassium 10/14/2024 4.2  3.5 - 5.1 mmol/L Final    Chloride 10/14/2024 105  95 - 110 mmol/L Final    CO2 10/14/2024 25  23 - 29 mmol/L Final    Glucose 10/14/2024 80  70 - 110 mg/dL Final    BUN 10/14/2024 23  8 - 23 mg/dL Final    Creatinine 10/14/2024 0.9  0.5 - 1.4 mg/dL Final    Calcium 10/14/2024 9.2  8.7 - 10.5 mg/dL Final    Total Protein 10/14/2024 8.4  6.0 - 8.4 g/dL Final    Albumin 10/14/2024 3.4 (L)  3.5 - 5.2 g/dL Final    Total Bilirubin 10/14/2024 0.4  0.1 - 1.0 mg/dL Final    Comment: For infants and newborns, interpretation of results should be based  on gestational age, weight and in agreement with clinical  observations.    Premature Infant recommended reference ranges:  Up to 24 hours.............<8.0 mg/dL  Up to 48 hours............<12.0 mg/dL  3-5 days..................<15.0 mg/dL  6-29 days.................<15.0 mg/dL      Alkaline Phosphatase 10/14/2024 86  55 - 135 U/L Final    AST 10/14/2024 19  10 - 40 U/L Final    ALT 10/14/2024 11  10 - 44 U/L Final    eGFR 10/14/2024 >60.0  >60 mL/min/1.73 m^2 Final    Anion Gap 10/14/2024 8  8 - 16 mmol/L Final    Cholesterol 10/14/2024 116 (L)  120 - 199 mg/dL Final    Comment: The National Cholesterol Education Program (NCEP) has set the  following guidelines (reference ranges) for Cholesterol:  Optimal.....................<200 mg/dL  Borderline High.............200-239 mg/dL  High........................> or = 240 mg/dL      Triglycerides 10/14/2024 79  30 - 150 mg/dL Final    Comment: The National Cholesterol Education Program (NCEP) has set the  following guidelines (reference values) for triglycerides:  Normal......................<150 mg/dL  Borderline High.............150-199  mg/dL  High........................200-499 mg/dL      HDL 10/14/2024 44  40 - 75 mg/dL Final    Comment: The National Cholesterol Education Program (NCEP) has set the  following guidelines (reference values) for HDL Cholesterol:  Low...............<40 mg/dL  Optimal...........>60 mg/dL      LDL Cholesterol 10/14/2024 56.2 (L)  63.0 - 159.0 mg/dL Final    Comment: The National Cholesterol Education Program (NCEP) has set the  following guidelines (reference values) for LDL Cholesterol:  Optimal.......................<130 mg/dL  Borderline High...............130-159 mg/dL  High..........................160-189 mg/dL  Very High.....................>190 mg/dL      HDL/Cholesterol Ratio 10/14/2024 37.9  20.0 - 50.0 % Final    Total Cholesterol/HDL Ratio 10/14/2024 2.6  2.0 - 5.0 Final    Non-HDL Cholesterol 10/14/2024 72  mg/dL Final    Comment: Risk category and Non-HDL cholesterol goals:  Coronary heart disease (CHD)or equivalent (10-year risk of CHD >20%):  Non-HDL cholesterol goal     <130 mg/dL  Two or more CHD risk factors and 10-year risk of CHD <= 20%:  Non-HDL cholesterol goal     <160 mg/dL  0 to 1 CHD risk factor:  Non-HDL cholesterol goal     <190 mg/dL      WBC 10/14/2024 5.68  3.90 - 12.70 K/uL Final    RBC 10/14/2024 3.95 (L)  4.00 - 5.40 M/uL Final    Hemoglobin 10/14/2024 13.4  12.0 - 16.0 g/dL Final    Hematocrit 10/14/2024 39.3  37.0 - 48.5 % Final    MCV 10/14/2024 100 (H)  82 - 98 fL Final    MCH 10/14/2024 33.9 (H)  27.0 - 31.0 pg Final    MCHC 10/14/2024 34.1  32.0 - 36.0 g/dL Final    RDW 10/14/2024 14.1  11.5 - 14.5 % Final    Platelets 10/14/2024 134 (L)  150 - 450 K/uL Final    MPV 10/14/2024 12.5  9.2 - 12.9 fL Final    Immature Granulocytes 10/14/2024 0.2  0.0 - 0.5 % Final    Gran # (ANC) 10/14/2024 4.2  1.8 - 7.7 K/uL Final    Immature Grans (Abs) 10/14/2024 0.01  0.00 - 0.04 K/uL Final    Comment: Mild elevation in immature granulocytes is non specific and   can be seen in a variety of  conditions including stress response,   acute inflammation, trauma and pregnancy. Correlation with other   laboratory and clinical findings is essential.      Lymph # 10/14/2024 1.1  1.0 - 4.8 K/uL Final    Mono # 10/14/2024 0.3  0.3 - 1.0 K/uL Final    Eos # 10/14/2024 0.0  0.0 - 0.5 K/uL Final    Baso # 10/14/2024 0.03  0.00 - 0.20 K/uL Final    nRBC 10/14/2024 0  0 /100 WBC Final    Gran % 10/14/2024 74.4 (H)  38.0 - 73.0 % Final    Lymph % 10/14/2024 18.7  18.0 - 48.0 % Final    Mono % 10/14/2024 5.8  4.0 - 15.0 % Final    Eosinophil % 10/14/2024 0.4  0.0 - 8.0 % Final    Basophil % 10/14/2024 0.5  0.0 - 1.9 % Final    Differential Method 10/14/2024 Automated   Final    TSH 10/14/2024 1.931  0.400 - 4.000 uIU/mL Final    Hemoglobin A1C 10/14/2024 5.0  4.0 - 5.6 % Final    Comment: ADA Screening Guidelines:  5.7-6.4%  Consistent with prediabetes  >or=6.5%  Consistent with diabetes    High levels of fetal hemoglobin interfere with the HbA1C  assay. Heterozygous hemoglobin variants (HbS, HgC, etc)do  not significantly interfere with this assay.   However, presence of multiple variants may affect accuracy.      Estimated Avg Glucose 10/14/2024 97  68 - 131 mg/dL Final       Assessment & Plan:     Assessment & Plan     Reviewed recent podiatry treatment for foot infection, including topical antibacterial solution and oral antibiotics   Assessed effectiveness of current antibiotic regimen (Levofloxacin and amoxicillin)   Evaluated fluid retention status, noting improvement with current furosemide dose   Reviewed recent bronchoscopy results, confirming absence of cancer cells and improvement in respiratory symptoms   Analyzed recent lab results, noting normal electrolytes, kidney and liver function, blood sugar, and favorable cholesterol levels   Considered iron supplementation based on borderline low ferritin levels, but decided to monitor given stable hemoglobin and hematocrit   Assessed groin area mass, suspecting  possible lipoma; decided to order ultrasound for further evaluation    HYPERLIPIDEMIA:   Explained cholesterol test results, emphasizing the benefits of low LDL levels for cardiovascular health.    DIABETES RISK:   Clarified hemoglobin A1C test results and their implications for diabetes risk.    ANEMIA:   Discussed the relationship between iron levels, ferritin, and blood count in assessing anemia.    COVID-19 VACCINATION:   Yamel to obtain latest COVID-19 vaccine at a local pharmacy.    MEDICATIONS/SUPPLEMENTS:   Continued Levofloxacin solution, amoxicillin, and furosemide 20 mg.    RIGHT PROXIMAL THIGH MASS:   Ultrasound of right proximal thigh mass ordered.       Total visit time was 40 minutes.  Follow up in about 6 months (around 4/21/2025).     Aly Rand MD

## 2024-10-23 ENCOUNTER — HOSPITAL ENCOUNTER (OUTPATIENT)
Dept: RADIOLOGY | Facility: HOSPITAL | Age: 74
Discharge: HOME | End: 2024-10-23
Attending: INTERNAL MEDICINE
Payer: MEDICARE

## 2024-10-23 DIAGNOSIS — R22.41 MASS OF RIGHT THIGH: ICD-10-CM

## 2024-10-23 PROCEDURE — 76882 US LMTD JT/FCL EVL NVASC XTR: CPT | Mod: TC,RT,TXP

## 2024-10-24 ENCOUNTER — OFFICE VISIT (OUTPATIENT)
Dept: PODIATRY | Facility: CLINIC | Age: 74
End: 2024-10-24
Payer: MEDICARE

## 2024-10-24 VITALS
DIASTOLIC BLOOD PRESSURE: 60 MMHG | OXYGEN SATURATION: 100 % | HEIGHT: 65 IN | SYSTOLIC BLOOD PRESSURE: 124 MMHG | HEART RATE: 91 BPM | WEIGHT: 143.5 LBS | BODY MASS INDEX: 23.91 KG/M2

## 2024-10-24 DIAGNOSIS — L97.922 ULCERS OF BOTH LOWER EXTREMITIES WITH FAT LAYER EXPOSED: ICD-10-CM

## 2024-10-24 DIAGNOSIS — L97.912 ULCERS OF BOTH LOWER EXTREMITIES WITH FAT LAYER EXPOSED: ICD-10-CM

## 2024-10-24 DIAGNOSIS — L03.119 CELLULITIS OF LOWER EXTREMITY, UNSPECIFIED LATERALITY: Primary | ICD-10-CM

## 2024-10-24 PROCEDURE — 99214 OFFICE O/P EST MOD 30 MIN: CPT | Mod: PBBFAC | Performed by: STUDENT IN AN ORGANIZED HEALTH CARE EDUCATION/TRAINING PROGRAM

## 2024-10-24 PROCEDURE — 99213 OFFICE O/P EST LOW 20 MIN: CPT | Mod: S$PBB,,, | Performed by: STUDENT IN AN ORGANIZED HEALTH CARE EDUCATION/TRAINING PROGRAM

## 2024-10-24 PROCEDURE — 99999 PR PBB SHADOW E&M-EST. PATIENT-LVL IV: CPT | Mod: PBBFAC,,, | Performed by: STUDENT IN AN ORGANIZED HEALTH CARE EDUCATION/TRAINING PROGRAM

## 2024-10-24 RX ORDER — LEVOFLOXACIN 25 MG/ML
500 SOLUTION ORAL DAILY
Qty: 200 ML | Refills: 0 | Status: SHIPPED | OUTPATIENT
Start: 2024-10-24

## 2024-10-24 RX ORDER — AMOXICILLIN AND CLAVULANATE POTASSIUM 250; 62.5 MG/5ML; MG/5ML
250 POWDER, FOR SUSPENSION ORAL DAILY
Qty: 150 ML | Refills: 0 | Status: SHIPPED | OUTPATIENT
Start: 2024-10-24

## 2024-10-24 RX ORDER — SODIUM CHLORIDE 0.9 G/100ML
10 IRRIGANT IRRIGATION ONCE
Qty: 1000 ML | Refills: 3 | Status: SHIPPED | OUTPATIENT
Start: 2024-10-24 | End: 2024-10-25

## 2024-10-24 RX ORDER — MUPIROCIN 20 MG/G
OINTMENT TOPICAL DAILY
Qty: 22 G | Refills: 3 | Status: SHIPPED | OUTPATIENT
Start: 2024-10-24

## 2024-10-24 NOTE — PROGRESS NOTES
Subjective:     Patient    Yamel Shah is a 73 y.o. female.    Problem    09/25/24: Seen by Dr Morton in 2021 for chronic bilateral foot wounds. Has been caring for them herself for the past few years because she felt she never made much progress with podiatry or wound care. Now presents with chronic bilateral foot wounds, burning, stinging which can sometimes keep her up at night. Previously tried amitriptyline but it cause headaches and nightmares.      10/04/24: Returns for chronic bilateral foot wounds, no new issues. On antibiotics.     10/11/24: Returns for chronic bilateral foot wounds. Had improved pain with duloxetine. Prescribed doxycycline + Levofloxacin but only taking the Levofloxacin.     10/24/24: Returns for chronic bilateral foot wounds. On Levofloxacin + Augmentin. Leaving town next week.     History    History obtained from patient and review of medical records.     Past Medical History:   Diagnosis Date    Abnormal Pap smear     Acid reflux     Allergy     Arthritis     Encounter for blood transfusion     GERD (gastroesophageal reflux disease)     Hiatal hernia 03/24/2023    History of migraine headaches     Hx of colonic polyp     Hypertension     Idiopathic neuropathy 07/20/2012    ILD (interstitial lung disease) 11/06/2013    Iron deficiency anemia 03/18/2014    MRSA carrier     Osteopenia     Pneumonia     Pulmonary fibrosis     Pulmonary hypertension     Raynaud's disease     Scleroderma, diffuse     Sjogren's syndrome     Vitamin D deficiency 11/14/2013       Past Surgical History:   Procedure Laterality Date    24 HOUR IMPEDANCE PH MONITORING OF ESOPHAGUS IN PATIENT NOT TAKING ACID REDUCING MEDICATIONS N/A 03/04/2020    Procedure: IMPEDANCE PH STUDY, ESOPHAGEAL, 24 HOUR, IN PATIENT NOT TAKING ACID REDUCING MEDICATION;  Surgeon: Annamaria Mendoza MD;  Location: [facility] ([address] [place] [address]);  Service: Endoscopy;  Laterality: N/A;  OFF PPI/H2 Blocker   Motility Studies   Hold  Narcotics x 1 days   Hold TCA x 1 days  2/26 - LVM attempting to confirm appt  2/27 - Confirmed appt    ANORECTAL MANOMETRY N/A 05/10/2021    Procedure: MANOMETRY, ANORECTAL with balloon expulsion test;  Surgeon: Annamaria Mendoza MD;  Location: [facility] ENDO (4TH FLR);  Service: Endoscopy;  Laterality: N/A;  order combined  covid test 5/7 [employer], instructions emailed-KP[facility]    BREAST BIOPSY      Left, benign    BRONCHOSCOPY N/A 10/2/2023    Procedure: bronch;  Surgeon: Roberta Leigh DO;  Location: [facility] OR 2ND FLR;  Service: Endoscopy;  Laterality: N/A;    BRONCHOSCOPY N/A 9/16/2024    Procedure: Flexible bronchoscopy (BAL only);  Surgeon: Roberta Leigh DO;  Location: [facility] OR 2ND FLR;  Service: Endoscopy;  Laterality: N/A;    CERVICAL CONIZATION   W/ LASER  1970    COLONOSCOPY      COLONOSCOPY N/A 03/29/2019    Procedure: COLONOSCOPY;  Surgeon: Annamaria Mendoza MD;  Location: [facility] AUGUSTIN (2ND FLR);  Service: Endoscopy;  Laterality: N/A;    COLONOSCOPY N/A 05/10/2021    Procedure: COLONOSCOPY;  Surgeon: Annamaria Mendoza MD;  Location: [facility] ENDO (4TH FLR);  Service: Endoscopy;  Laterality: N/A;  2nd floor-previous scopes done on 2nd floor, gastroparesis  full liquid diet x2 days, clear liquid x1 day prior to procedure  covid test 5/7 [employer]rian, instructions emailed-KPvt  5/6 pt confirmed appt-KPvt    DILATION AND CURETTAGE OF UTERUS      ESOPHAGEAL MANOMETRY WITH MEASUREMENT OF IMPEDANCE N/A 03/04/2020    Procedure: MANOMETRY, ESOPHAGUS, WITH IMPEDANCE MEASUREMENT;  Surgeon: Annamaria Mendoza MD;  Location: [facility] ENDO (4TH FLR);  Service: Endoscopy;  Laterality: N/A;  OFF PPI/H2 Blocker   Motility Studies   Hold Narcotics x 1 days   Hold TCA x 1 days    ESOPHAGOGASTRODUODENOSCOPY      ESOPHAGOGASTRODUODENOSCOPY N/A 03/29/2019    Procedure: EGD (ESOPHAGOGASTRODUODENOSCOPY);  Surgeon: Annamaria Mendoza MD;  Location: [facility] ENDO (2ND FLR);  Service: Endoscopy;  Laterality: N/A;  pulmonary htn     ESOPHAGOGASTRODUODENOSCOPY N/A 02/02/2021    Procedure: ESOPHAGOGASTRODUODENOSCOPY (EGD);  Surgeon: Annamaria Mendoza MD;  [facility]  Service: Endoscopy;  Laterality: N/A;  2nd floor gastroparesis  3 days full liquid diet and 1 day clears  covid test 1/30 primary care, instructions sent to [facility]    ESOPHAGOGASTRODUODENOSCOPY N/A 07/01/2022    Procedure: ESOPHAGOGASTRODUODENOSCOPY (EGD);  Surgeon: Annamaria Mendoza MD;  [facility]  Service: Endoscopy;  Laterality: N/A;  2nd floor-gastroparesis  full liquid diet x3 days, clear liquid diet x1 day prior to procedure  fully vaccinated, instructions sent to [facility]  6/29-pt confirmed new arrival time-[facility]    EXCISION, LESION, STOMACH, LAPAROSCOPIC N/A 03/23/2023    Procedure: XI ROBOTIC EXCISION, LESION, STOMACH;  Surgeon: Katherine Segura MD;  Location: [facility] OR [address] [place] [address];  Service: General;  Laterality: N/A;    EXCISIONAL BIOPSY, LYMPH NODE Right 05/26/2023    Procedure: EXCISIONAL BIOPSY, LYMPH NODE, INGUINAL;  Surgeon: Katherine Segura MD;  Location: [facility] OR [address] [place] [address];  Service: General;  Laterality: Right;    HYSTERECTOMY  1990    FRANDY (AUB, Fibroids), ovaries remain    REPAIR, HERNIA, UMBILICAL N/A 03/23/2023    Procedure: REPAIR, HERNIA, UMBILICAL;  Surgeon: Katherine Segura MD;  Location: [facility] OR [address] [place] [address];  Service: General;  Laterality: N/A;    RIGHT HEART CATHETERIZATION Right 01/05/2021    Procedure: INSERTION, CATHETER, RIGHT HEART;  Surgeon: Aureliano Sy MD;  Location: [facility] CATH LAB;  Service: Cardiology;  Laterality: Right;    RIGHT HEART CATHETERIZATION Right 03/16/2023    Procedure: INSERTION, CATHETER, RIGHT HEART;  Surgeon: Aureliano Sy MD;  Location: [facility] CATH LAB;  Service: Cardiology;  Laterality: Right;    ROBOT-ASSISTED LAPAROSCOPIC REPAIR OF INGUINAL HERNIA USING DA VERNELL XI Right 05/26/2023    Procedure: XI ROBOTIC REPAIR, HERNIA, INGUINAL, FEMORAL;   Surgeon: Katherine Segura MD;  Location: [facility] OR [facility] FLR;  Service: General;  Laterality: Right;    ROBOT-ASSISTED REPAIR OF HIATAL HERNIA USING DA VERNELL XI N/A 2023    Procedure: XI ROBOTIC REPAIR, HERNIA, HIATAL;  Surgeon: Katherine Segura MD;  [facility];  Service: General;  Laterality: N/A;    VARICOSE VEIN SURGERY      XI ROBOTIC GASTROPEXY N/A 2023    Procedure: XI ROBOTIC GASTROPEXY;  Surgeon: Katherine Segura MD;  [facility];  Service: General;  Laterality: N/A;    XI ROBOTIC PYLOROMYOTOMY N/A 2023    Procedure: XI ROBOTIC PYLOROMYOTOMY;  Surgeon: Katherine Segura MD;  [facility];  Service: General;  Laterality: N/A;        Objective:     Vitals  Wt Readings from Last 1 Encounters:   10/24/24 65.1 kg (143 lb 8.3 oz)     Temp Readings from Last 1 Encounters:   10/21/24 98.5 °F (36.9 °C) (Temporal)     BP Readings from Last 1 Encounters:   10/24/24 124/60     Pulse Readings from Last 1 Encounters:   10/24/24 91       Dermatological Exam    Skin:  Skin of both feet thickened (scleroderma), stiff, with extensive ulcerations of both feet down to fat        Nails:  10 nail(s) elongated, 10 nail(s) thickened, and 10 nail(s) discolored    Vascular Exam    Arteries:  Posterior tibial artery palpable on right  Dorsalis pedis artery palpable on right  Posterior tibial artery palpable on left  Dorsalis pedis artery palpable on left    Veins:  Superficial veins unremarkable on right  Superficial veins unremarkable on left    Swellin+ nonpitting on right  1+ nonpitting on left    Neurological Exam    [place] touch test:  6/6 sites sensed, light touch intact     Musculoskeletal Exam    Footwear:  Casual on right  Casual on left    Gait Exam:   Ambulatory Status: Ambulatory  Gait: Normal  Assistive Devices: None    Foot Progression Angle:  Normal on right  Normal on left    Right Lower Extremity Additional  Findings:  Right foot and ankle function, strength, and range of motion unremarkable except as noted above.     Left Lower Extremity Additional Findings:  Left foot and ankle function, strength, and range of motion unremarkable except as noted above.    Imaging and Other Tests    Imaging:  Independently reviewed and interpreted imaging, findings are as follows: N/A     Assessment:     Encounter Diagnoses   Name Primary?    Cellulitis of lower extremity, unspecified laterality Yes    Ulcers of both lower extremities with fat layer exposed             Plan:     I counseled the patient on her conditions, their implications and medical management.    Neuropathy: chronic stable  -Continue duloxetine.     Bilateral ulcers, cellulitis, swelling: chronic stable  -Continue Levofloxacin + Augmentin.   -Recommended debridement, patient will consider in future.   -Dressings changed.    -Protected WB in surgical shoes.     Return to clinic in 2 weeks, call sooner PRN.

## 2024-10-25 ENCOUNTER — TELEPHONE (OUTPATIENT)
Dept: TRANSPLANT | Facility: CLINIC | Age: 74
End: 2024-10-25
Payer: MEDICARE

## 2024-10-25 ENCOUNTER — PATIENT MESSAGE (OUTPATIENT)
Dept: INTERNAL MEDICINE | Facility: CLINIC | Age: 74
End: 2024-10-25
Payer: MEDICARE

## 2024-10-25 ENCOUNTER — HOSPITAL ENCOUNTER (OUTPATIENT)
Dept: RADIOLOGY | Facility: HOSPITAL | Age: 74
Discharge: HOME | End: 2024-10-25
Attending: INTERNAL MEDICINE
Payer: MEDICARE

## 2024-10-25 ENCOUNTER — TELEPHONE (OUTPATIENT)
Dept: PAIN MEDICINE | Facility: CLINIC | Age: 74
End: 2024-10-25
Payer: MEDICARE

## 2024-10-25 DIAGNOSIS — R93.89 ABNORMAL CHEST CT: ICD-10-CM

## 2024-10-25 PROCEDURE — 71250 CT THORAX DX C-: CPT | Mod: TC,TXP

## 2024-10-25 PROCEDURE — 71250 CT THORAX DX C-: CPT | Mod: 26,NTX,, | Performed by: RADIOLOGY

## 2024-10-25 NOTE — TELEPHONE ENCOUNTER
Attempted to contact patient in reference to appointment with Mikayla today. Mikayla will not be in and appt needs to be rescheduled. No answer lvm

## 2024-10-25 NOTE — TELEPHONE ENCOUNTER
The patient has prescription orders for antibiotics already from Podiatry. She should use these.  The lymph nodes seen on ultrasound can result from chronic inflammation involving the skin and soft tissues of her right lower extremity (such as ankle and foot ulcers).

## 2024-11-04 ENCOUNTER — TELEPHONE (OUTPATIENT)
Dept: PODIATRY | Facility: CLINIC | Age: 74
End: 2024-11-04
Payer: MEDICARE

## 2024-11-04 NOTE — TELEPHONE ENCOUNTER
Spoke with patient in regards to her appointment on 11/05/2024 with Dr. Nava(Podiatry), patient has verbally confirmed her appt.            ----- Message from Charles Hargrove sent at 11/4/2024 11:09 AM CST -----  Contact: [phone]    ----- Message -----  From: Angie Zavala  Sent: 11/4/2024  10:39 AM CST  To: [facility] Staff    PATIENTCALL     Pt is calling to speak with someone in provider office regarding she states she was suppose to have a 2 week follow up to see the provider and no one has called to schedule. She is asking for a return call please call.

## 2024-11-05 ENCOUNTER — OFFICE VISIT (OUTPATIENT)
Dept: PAIN MEDICINE | Facility: CLINIC | Age: 74
End: 2024-11-05
Payer: MEDICARE

## 2024-11-05 ENCOUNTER — OFFICE VISIT (OUTPATIENT)
Dept: PODIATRY | Facility: CLINIC | Age: 74
End: 2024-11-05
Payer: MEDICARE

## 2024-11-05 VITALS
WEIGHT: 142 LBS | DIASTOLIC BLOOD PRESSURE: 58 MMHG | HEART RATE: 77 BPM | SYSTOLIC BLOOD PRESSURE: 111 MMHG | BODY MASS INDEX: 23.63 KG/M2 | OXYGEN SATURATION: 99 % | TEMPERATURE: 97 F

## 2024-11-05 VITALS
BODY MASS INDEX: 23.63 KG/M2 | HEART RATE: 78 BPM | SYSTOLIC BLOOD PRESSURE: 118 MMHG | HEIGHT: 65 IN | DIASTOLIC BLOOD PRESSURE: 67 MMHG

## 2024-11-05 DIAGNOSIS — L03.119 CELLULITIS OF LOWER EXTREMITY, UNSPECIFIED LATERALITY: ICD-10-CM

## 2024-11-05 DIAGNOSIS — G60.9 IDIOPATHIC NEUROPATHY: Primary | ICD-10-CM

## 2024-11-05 DIAGNOSIS — Z79.891 ENCOUNTER FOR MONITORING OPIOID MAINTENANCE THERAPY: ICD-10-CM

## 2024-11-05 DIAGNOSIS — L97.922 ULCERS OF BOTH LOWER EXTREMITIES WITH FAT LAYER EXPOSED: ICD-10-CM

## 2024-11-05 DIAGNOSIS — M79.18 MYOFASCIAL PAIN: ICD-10-CM

## 2024-11-05 DIAGNOSIS — G89.4 CHRONIC PAIN DISORDER: Primary | ICD-10-CM

## 2024-11-05 DIAGNOSIS — Z51.81 ENCOUNTER FOR MONITORING OPIOID MAINTENANCE THERAPY: ICD-10-CM

## 2024-11-05 DIAGNOSIS — G60.9 IDIOPATHIC NEUROPATHY: ICD-10-CM

## 2024-11-05 DIAGNOSIS — L97.912 ULCERS OF BOTH LOWER EXTREMITIES WITH FAT LAYER EXPOSED: ICD-10-CM

## 2024-11-05 DIAGNOSIS — M34.89 CUTANEOUS SCLERODERMA: ICD-10-CM

## 2024-11-05 PROCEDURE — 99999 PR PBB SHADOW E&M-EST. PATIENT-LVL IV: CPT | Mod: PBBFAC,,, | Performed by: NURSE PRACTITIONER

## 2024-11-05 PROCEDURE — 99214 OFFICE O/P EST MOD 30 MIN: CPT | Mod: S$PBB,,, | Performed by: STUDENT IN AN ORGANIZED HEALTH CARE EDUCATION/TRAINING PROGRAM

## 2024-11-05 PROCEDURE — 99214 OFFICE O/P EST MOD 30 MIN: CPT | Mod: PBBFAC | Performed by: NURSE PRACTITIONER

## 2024-11-05 PROCEDURE — 99214 OFFICE O/P EST MOD 30 MIN: CPT | Mod: PBBFAC,27 | Performed by: STUDENT IN AN ORGANIZED HEALTH CARE EDUCATION/TRAINING PROGRAM

## 2024-11-05 PROCEDURE — 99999 PR PBB SHADOW E&M-EST. PATIENT-LVL IV: CPT | Mod: PBBFAC,,, | Performed by: STUDENT IN AN ORGANIZED HEALTH CARE EDUCATION/TRAINING PROGRAM

## 2024-11-05 PROCEDURE — 99214 OFFICE O/P EST MOD 30 MIN: CPT | Mod: S$PBB,,, | Performed by: NURSE PRACTITIONER

## 2024-11-05 RX ORDER — DULOXETIN HYDROCHLORIDE 60 MG/1
60 CAPSULE, DELAYED RELEASE ORAL DAILY
Qty: 30 CAPSULE | Refills: 11 | Status: SHIPPED | OUTPATIENT
Start: 2024-11-05 | End: 2025-11-05

## 2024-11-05 RX ORDER — LEVOFLOXACIN 25 MG/ML
500 SOLUTION ORAL DAILY
Qty: 200 ML | Refills: 0 | Status: SHIPPED | OUTPATIENT
Start: 2024-11-05

## 2024-11-05 RX ORDER — TIZANIDINE 4 MG/1
8 TABLET ORAL 2 TIMES DAILY PRN
Qty: 60 TABLET | Refills: 3 | Status: SHIPPED | OUTPATIENT
Start: 2024-11-05

## 2024-11-05 RX ORDER — NABUMETONE 750 MG/1
750 TABLET, FILM COATED ORAL DAILY PRN
Qty: 30 TABLET | Refills: 3 | Status: SHIPPED | OUTPATIENT
Start: 2024-11-05

## 2024-11-05 RX ORDER — AMOXICILLIN AND CLAVULANATE POTASSIUM 250; 62.5 MG/5ML; MG/5ML
250 POWDER, FOR SUSPENSION ORAL DAILY
Qty: 150 ML | Refills: 0 | Status: SHIPPED | OUTPATIENT
Start: 2024-11-05

## 2024-11-05 NOTE — PROGRESS NOTES
Subjective:     Patient    Yamel Shah is a 73 y.o. female.    Problem    09/25/24: Seen by Dr Morton in 2021 for chronic bilateral foot wounds. Has been caring for them herself for the past few years because she felt she never made much progress with podiatry or wound care. Now presents with chronic bilateral foot wounds, burning, stinging which can sometimes keep her up at night. Previously tried amitriptyline but it cause headaches and nightmares.      10/04/24: Returns for chronic bilateral foot wounds, no new issues. On antibiotics.     10/11/24: Returns for chronic bilateral foot wounds. Had improved pain with duloxetine. Prescribed doxycycline + Levofloxacin but only taking the Levofloxacin.     10/24/24: Returns for chronic bilateral foot wounds. On Levofloxacin + Augmentin. Leaving town next week.     11/05/24: Returns for chronic bilateral foot wounds. Pain definitely improved with duloxetine but still present, no side effects. Still significant drainage both feet. Out of antibiotics, no side effects.     History    History obtained from patient and review of medical records.     Past Medical History:   Diagnosis Date    Abnormal Pap smear     Acid reflux     Allergy     Arthritis     Encounter for blood transfusion     GERD (gastroesophageal reflux disease)     Hiatal hernia 03/24/2023    History of migraine headaches     Hx of colonic polyp     Hypertension     Idiopathic neuropathy 07/20/2012    ILD (interstitial lung disease) 11/06/2013    Iron deficiency anemia 03/18/2014    MRSA carrier     Osteopenia     Pneumonia     Pulmonary fibrosis     Pulmonary hypertension     Raynaud's disease     Scleroderma, diffuse     Sjogren's syndrome     Vitamin D deficiency 11/14/2013       Past Surgical History:   Procedure Laterality Date    24 HOUR IMPEDANCE PH MONITORING OF ESOPHAGUS IN PATIENT NOT TAKING ACID REDUCING MEDICATIONS N/A 03/04/2020    Procedure: IMPEDANCE PH STUDY, ESOPHAGEAL, 24  HOUR, IN PATIENT NOT TAKING ACID REDUCING MEDICATION;  Surgeon: Annamaria Mendoza MD;  Location: [facility] ENDO (4TH FLR);  Service: Endoscopy;  Laterality: N/A;  OFF PPI/H2 Blocker   Motility Studies   Hold Narcotics x 1 days   Hold TCA x 1 days  2/26 - LVM attempting to confirm appt  2/27 - Confirmed appt    ANORECTAL MANOMETRY N/A 05/10/2021    Procedure: MANOMETRY, ANORECTAL with balloon expulsion test;  Surgeon: Annamaria Mendoza MD;  Location: [facility] ENDO (4TH FLR);  Service: Endoscopy;  Laterality: N/A;  order combined  covid test 5/7 [employer]rianism, instructions emailed-[facility]    BREAST BIOPSY      Left, benign    BRONCHOSCOPY N/A 10/2/2023    Procedure: bronch;  Surgeon: Roberta Leigh DO;  Location: [facility] OR 2ND FLR;  Service: Endoscopy;  Laterality: N/A;    BRONCHOSCOPY N/A 9/16/2024    Procedure: Flexible bronchoscopy (BAL only);  Surgeon: Roberta Leigh DO;  [facility];  Service: Endoscopy;  Laterality: N/A;    CERVICAL CONIZATION   W/ LASER  1970    COLONOSCOPY      COLONOSCOPY N/A 03/29/2019    Procedure: COLONOSCOPY;  Surgeon: Annamaria Mendoza MD;  Location: [facility] (2ND FLR);  Service: Endoscopy;  Laterality: N/A;    COLONOSCOPY N/A 05/10/2021    Procedure: COLONOSCOPY;  Surgeon: Annamaria Mendoza MD;  Location: [facility] (4TH FLR);  Service: Endoscopy;  Laterality: N/A;  2nd floor-previous scopes done on 2nd floor, gastroparesis  full liquid diet x2 days, clear liquid x1 day prior to procedure  covid test 5/7 [employer]rianism, instructions emailed-KPvt  5/6 pt confirmed appt-KPvt    DILATION AND CURETTAGE OF UTERUS      ESOPHAGEAL MANOMETRY WITH MEASUREMENT OF IMPEDANCE N/A 03/04/2020    Procedure: MANOMETRY, ESOPHAGUS, WITH IMPEDANCE MEASUREMENT;  Surgeon: Annamaria Mendoza MD;  Location: [facility] ENDO (4TH FLR);  Service: Endoscopy;  Laterality: N/A;  OFF PPI/H2 Blocker   Motility Studies   Hold Narcotics x 1 days   Hold TCA x 1 days    ESOPHAGOGASTRODUODENOSCOPY      ESOPHAGOGASTRODUODENOSCOPY  N/A 03/29/2019    Procedure: EGD (ESOPHAGOGASTRODUODENOSCOPY);  Surgeon: Annamaria Mendoza MD;  Location: [facility] ENDO (2ND FLR);  Service: Endoscopy;  Laterality: N/A;  pulmonary htn    ESOPHAGOGASTRODUODENOSCOPY N/A 02/02/2021    Procedure: ESOPHAGOGASTRODUODENOSCOPY (EGD);  Surgeon: Annamaria Mendoza MD;  Location: [facility] ENDO (2ND FLR);  Service: Endoscopy;  Laterality: N/A;  2nd floor gastroparesis  3 days full liquid diet and 1 day clears  covid test 1/30 primary care, instructions sent to [facility]    ESOPHAGOGASTRODUODENOSCOPY N/A 07/01/2022    Procedure: ESOPHAGOGASTRODUODENOSCOPY (EGD);  Surgeon: Annamaria Mendoza MD;  Location: [facility] ENDO (2ND FLR);  Service: Endoscopy;  Laterality: N/A;  2nd floor-gastroparesis  full liquid diet x3 days, clear liquid diet x1 day prior to procedure  fully vaccinated, instructions sent to [facility]  6/29-pt confirmed new arrival time-[facility]    EXCISION, LESION, STOMACH, LAPAROSCOPIC N/A 03/23/2023    Procedure: XI ROBOTIC EXCISION, LESION, STOMACH;  Surgeon: Katherine Segura MD;  Location: [facility] OR [address] [place] [address];  Service: General;  Laterality: N/A;    EXCISIONAL BIOPSY, LYMPH NODE Right 05/26/2023    Procedure: EXCISIONAL BIOPSY, LYMPH NODE, INGUINAL;  Surgeon: Katherine Segura MD;  Location: [facility] OR [address] [place] [address];  Service: General;  Laterality: Right;    HYSTERECTOMY  1990    FRANDY (AUB, Fibroids), ovaries remain    REPAIR, HERNIA, UMBILICAL N/A 03/23/2023    Procedure: REPAIR, HERNIA, UMBILICAL;  Surgeon: Katherine Segura MD;  [facility];  Service: General;  Laterality: N/A;    RIGHT HEART CATHETERIZATION Right 01/05/2021    Procedure: INSERTION, CATHETER, RIGHT HEART;  Surgeon: Aureliano Sy MD;  Location: [facility] CATH LAB;  Service: Cardiology;  Laterality: Right;    RIGHT HEART CATHETERIZATION Right 03/16/2023    Procedure: INSERTION, CATHETER, RIGHT HEART;  Surgeon: Aureliano Sy MD;  Location: [facility] CATH LAB;  Service:  Cardiology;  Laterality: Right;    ROBOT-ASSISTED LAPAROSCOPIC REPAIR OF INGUINAL HERNIA USING DA VERNELL XI Right 2023    Procedure: XI ROBOTIC REPAIR, HERNIA, INGUINAL, FEMORAL;  Surgeon: Katherine Segura MD;  [facility];  Service: General;  Laterality: Right;    ROBOT-ASSISTED REPAIR OF HIATAL HERNIA USING DA VERNELL XI N/A 2023    Procedure: XI ROBOTIC REPAIR, HERNIA, HIATAL;  Surgeon: Katherine Segura MD;  Location: [facility] OR [facility] FLR;  Service: General;  Laterality: N/A;    VARICOSE VEIN SURGERY      XI ROBOTIC GASTROPEXY N/A 2023    Procedure: XI ROBOTIC GASTROPEXY;  Surgeon: Katherine Segura MD;  [facility];  Service: General;  Laterality: N/A;    XI ROBOTIC PYLOROMYOTOMY N/A 2023    Procedure: XI ROBOTIC PYLOROMYOTOMY;  Surgeon: Katherine Segura MD;  [facility];  Service: General;  Laterality: N/A;        Objective:     Vitals  Wt Readings from Last 1 Encounters:   24 64.4 kg (141 lb 15.6 oz)     Temp Readings from Last 1 Encounters:   24 97.3 °F (36.3 °C)     BP Readings from Last 1 Encounters:   24 118/67     Pulse Readings from Last 1 Encounters:   24 78       Dermatological Exam    Skin:  Skin of both feet thickened (scleroderma), stiff, with extensive ulcerations of both feet down to fat with slowly improving granulation      Nails:  10 nail(s) elongated, 10 nail(s) thickened, and 10 nail(s) discolored    Vascular Exam    Arteries:  Posterior tibial artery palpable on right  Dorsalis pedis artery palpable on right  Posterior tibial artery palpable on left  Dorsalis pedis artery palpable on left    Veins:  Superficial veins unremarkable on right  Superficial veins unremarkable on left    Swellin+ nonpitting on right  1+ nonpitting on left    Neurological Exam    [place] touch test:  6/6 sites sensed, light touch intact     Musculoskeletal Exam    Footwear:  Casual on  right  Casual on left    Gait Exam:   Ambulatory Status: Ambulatory  Gait: Normal  Assistive Devices: None    Foot Progression Angle:  Normal on right  Normal on left    Right Lower Extremity Additional Findings:  Right foot and ankle function, strength, and range of motion unremarkable except as noted above.     Left Lower Extremity Additional Findings:  Left foot and ankle function, strength, and range of motion unremarkable except as noted above.    Imaging and Other Tests    Imaging:  Independently reviewed and interpreted imaging, findings are as follows: N/A     Assessment:     Encounter Diagnoses   Name Primary?    Cellulitis of lower extremity, unspecified laterality     Idiopathic neuropathy Yes    Ulcers of both lower extremities with fat layer exposed             Plan:     I counseled the patient on her conditions, their implications and medical management.    Neuropathy: chronic stable  -Increase duloxetine to 60 mg/day.     Bilateral ulcers, cellulitis, swelling: chronic stable  -Resume Levofloxacin + Augmentin.   -Recommended debridement, patient will consider in future.   -Dressings changed.    -Protected WB in surgical shoes.     Return to clinic in 1-2 weeks, call sooner PRN.

## 2024-11-05 NOTE — PROGRESS NOTES
Chronic Pain - Follow Up          Chief Complaint:   Chief Complaint   Patient presents with    Follow-up    Foot Pain        SUBJECTIVE: Disclaimer: This note has been generated using voice-recognition software. There may be typographical errors that have been missed during proof-reading    Interval History 11/5/2024:  The patient returns to clinic today for follow up of foot pain. She is having worsening foot pain. She now has non-healing ulcers on both feet. She is following with Podiatry for wound care. She was started of Cymbalta which has been helpful. She also takes Lyrica, Relafen, and Zanaflex. She also takes Tylenol #4 as needed with benefit. She denies any adverse effects. She denies any other health changes. Her pain today is 6/10.    Interval History 7/30/2024:  The patient returns to clinic today for follow up of foot pain. She continues to report bilateral foot pain. She continues to have bilateral wounds to her feet. She does have worsened pain with certain shoes. She is currently taking Lyrica, Relafen, and Zanaflex with benefit. She also takes Tylenol #4 with benefit. She denies any adverse effects. She denies any other health changes. Her pain today is 6/10.    Interval History 4/29/2024:  The patient returns to clinic today for follow up of foot pain. She continues to report bilateral foot pain. Pain was more significant this weekend with swelling (R>L), denies inciting incident. Reported 7/10 pain. Improved with ace wrap on leg and increasing dose of tylenol #3 and tizanidine. Current pain 6/10. She is currently taking Lyrica, Relafen, Zanaflex and Tylenol #3 as needed with benefit. She denies any other health changes. Patient denies red flags including weakness, unexpected weight loss/gain, night sweats/fevers, saddle anesthesia, and symptoms of UBALDO.    Interval History 1/26/2024:  The patient returns to clinic today for follow up of foot pain. She continues to report bilateral foot pain. This  pain is worse at night. She does sometimes wake up due to pain. She continues to have open wounds, currently on her ankles. She is currently taking Lyrica, Relafen, and Zanaflex. She also takes Tylenol #3 as needed with benefit. She denies any other health changes. Her pain today is 5/10.    Interval History 10/27/2023:  The patient returns to clinic today for follow up of foot pain. She continues to report bilateral foot pain. She describes this pain as burning and stinging in nature. She now has sores under her ankles. She is having trouble laying on her sides at night due to pain. The sores by her toes are healing. She continues to report benefit with home medication regimen. She is taking Lyrica, Zanaflex, and Relafen. She also takes Tylenol #3 with benefit. She is out of this medication. She denies any adverse effects. She denies any other health changes. Her pain today is 5/10.     Interval History 7/28/2023:  The patient returns to clinic today for follow up of neck and foot pain via virtual visit. She continues to report bilateral foot pain. This is worse at night, described as burning in nature. She does report some new ulcers to her feet. She does have a home wound care regimen. She continues to report intermittent neck pain. She is taking Lyrica, Zanaflex, and Relafen with benefit. She also takes Tylenol #3 as needed for severe pain with benefit and without adverse effects. She denies any other health changes.     Interval History 4/28/2023:  The patient returns to clinic today for follow up of neck and foot pain. Since last visit, she has had pneumonia. She also had hiatal hernia repair. She continues to report bilateral foot pain. This is burning in nature. Her pain is worse at night. She reports intermittent neck pain. She continues to take Lyrica, Zanaflex, and Relafen with benefit. She also takes Tylenol #3 for severe pain. She denies any adverse effects. She denies any other health changes. Her pain  today is 4/10.    Interval History 12/30/2022:  The patient returns to clinic today for follow up of neck and foot pain. She reports increased foot pain over the last 2 months. She continues to report ulcers to her feet. She reports bilateral foot pain. Her pain is worse at night. She reports intermittent neck pain. She is taking Lyrica, Zanaflex, and Relafen with benefit. She also takes Tylenol #3 with benefit. She denies any adverse effects. She denies any other health changes. Her pain today is 6/10.    Interval History 8/26/2022:  The patient returns to clinic today for follow up neck and foot pain. She reports increased pain over the last few days. She attributes this as she is out of Lyrica. She continues to report bilateral foot pain. She continues to have wounds. She continues to perform wound care. She continues to report intermittent neck pain. She continues to take Lyrica, Zanaflex, and Relafen with benefit. She continues to take Tylenol #3 for severe pain as needed with benefit. She denies any adverse effects. She denies any other health changes. Her pain today is 4/10.    Interval History 5/26/2022:  The patient returns to clinic today for follow up of neck and foot pain via virtual visit. She continues to report bilateral foot pain. She continues to report ulcers to her feet. She continues to report neck pain with intermittent radiating pain into her arms. She continues to report benefit with current medications. She is taking Lyrica, Zanaflex, and Relafen. She also takes Tylenol #3 with benefit. She denies any adverse effects with these medications. She continues to perform a home exercise routine. She denies any other health changes.     Interval History 2/15/22  Patient presents in follow up. Was previously Dr. King patient with primary complaints of neck and foot pain. She reports her pain has been stable since her last visit. She did have covid in January and stopped all of ehr pain  medications. She got an antibody infusion. She has fully recovered. She restarted her pain medications and reports that they are effective. She is taking Tylenol 3 daily, nambumetome 750 mg BID, lyrica 150 mg BID and Zanaflex 4 mg TID. She did do PT for her neck November to January per her report. She continues with mild neck pain without radiation into the arms or hands. Pain is worse with sidebending and rotation to the right and better with rest and medications. She denies any numbness tingling weakness or b/b incontinence.    Interval History 1/4/2022:  The patient returns to clinic today for follow up of foot pain via virtual visit. She continues to report foot pain and wounds. She continues to follow up with podiatry and wound care. She reports increased neck pain. This pain is tight and aching in nature. She denies any radicular arm pain. She is currently taking Relafen, Lyrica, and Tylenol #3 with benefit and without adverse effects. She reports limited benefit with Zanaflex. She denies any other health changes.     Interval History 12/8/2021:  The patient returns to clinic today for foot pain via virtual visit. She continues to report foot pain. She does have foot wounds. She recently saw Dr. Claudio in [facility]. He does not recommend any vascular interventions at this time. She continues to follow up with Podiatry and wound care. She continues to take Zanaflex, Relafen, Lyrica and Tylenol #3 with benefit. She denies any adverse effects. She denies any other health changes. Her pain today is 7/10.    Interval History 11/8/2021:  The patient returns to clinic today for follow up of foot pain via virtual visit. At last visit, she was starting on Tylenol #3. She does find benefit with this. She sometimes breaks this in half. She continues to take Zanaflex, Relafen, and Lyrica. She continues to report bilateral foot pain and ulcers. She continues to follow up with podiatry. Her pain is worse at night. She denies  any other health changes. Her pain today is 7/10.    Interval History 10/20/2021:  The patient returns to clinic today for follow up of foot pain. At last visit, she was started on Tramadol. She did have relief but had significant side effects. She experienced sedation, itching, headaches, and decreased appetite. This has resolved after stopping the medication. She continues to report foot pain. This pain is worse at night. She continues to follow up with podiatry. She continues to take Tizanidine, Relafen, and Lyrica with some benefit. She denies any adverse effects. She denies any other health changes. Her pain today is 8/10.    Interval history 10/06/2021:  Since previous encounter the patient continues to have nonhealing wound has been followed up with wound care and is scheduled to follow with Rheumatology for the wounds in her legs she was referred to podiatry with stated that they have done nothing different me that she with doing on her own.  She continues to have neck pain and bilateral foot pain.  She has been taking tizanidine Relafen Tylenol p.m. and Lyrica 600 mg per day.  She states that all these medications are helpful.    Interval History 6/3/2021:  The patient returns to clinic today for follow up of foot pain. She continues to report left foot pain secondary to ulcer. She is seeing Wound Care. She describes this pain as burning in nature. Her pain is worse with prolonged activity. She reports intermittent neck pain. She continues to take Zanaflex. She reports limited benefit with increased Lyrica dose. She denies any other health changes. Her pain today is 5/10.    Interval History 5/5/2021:  The patient returns to clinic today for follow up of foot pain. She reports a new ulcer on her left foot. She is seeing Wound Care. She reports increased pain with this new ulcer. She describes this pain as burning. She continues to report neck pain that is tight and aching. She denies any radicular arm pain.  Her pain is worse with prolonged activity, especially turning her head to the side. She continues to perform a home exercise routine. She continues to take Lyrica and Zanaflex with benefit. She denies any other health changes. Her pain today is 5/10.    Interval History 2/8/2021:  The patient returns to clinic today for follow up of neck and foot pain. She reports that her neck pain has significantly improved since last visit. She has recently completed physical therapy for this. She is performing a home stretching routine. She reports intermittent neck pain that is tight and aching in nature. She denies any radicular arm pain. She continues to report bilateral foot pain. This is worse at night. She continues to take Lyrica and Zanaflex with benefit. She denies any other health changes. Her pain today is 4/10.    Interval History 1/8/2021:  The patient returns to clinic today for follow up of neck and foot pain. She continues to report neck pain that is tight and aching in nature. She denies any radicular pain. She continues to participate in physical therapy and a home exercise routine. She continues to report bilateral foot pain, worse at night. She reports that increased Lyrica dose has provided improved benefit. She also takes Zanaflex with benefit. She denies any other health changes. Her pain today is 3/10.    Interval History 11/13/2020:  The patient returns to clinic today for follow up of neck and foot pain. She continues to report bilateral foot pain. Since last visit, she had a venous ablation procedure on her right leg through Vascular. She is scheduled for the left side in the next month. She continues to report bilateral foot pain, worse at night. She continues to report neck pain that is tight and aching in nature. She denies any radicular pain. Her pain is worse flexion and turning her head to the side. She is currently participating in physical therapy with some benefit. She continues to take Lyrica  but does ask if this could be increased. She continues to take Zanaflex with benefit. She denies any other health changes. Her pain today is 5/10    Interval History 10/2/2020:  The patient returns to clinic today for follow up of foot pain. She reports increased bilateral foot pain over the last few months. This pain is burning in nature. This pain is worse at night. She continues to have ulcers to her feet related to her scleroderma. She would like to restart the Lyrica. She also reports increased neck pain that is sore and aching in nature. She denies any radiating arm pain. This is worse with turning her head and at night. She denies any other health changes. Her pain today is 7/10.    Interval History 6/5/2020:  The patient requests audio visit today for follow up of bilateral foot pain. She reports intermittent foot pain that is tolerable at this time. She has discontinued Lyrica as of April. She continues to have ulcers to her feet. She does have wound care. She denies any other health changes.     Interval History 1/17/2020:  The patient returns to clinic today for follow up. She continues to report bilateral foot pain. She describes this pain as burning and tingling in nature. At last visit, we decreased her Lyrica dose to 100 mg at night. She reports increased pain since then. She would like to go to back to the 150 mg dose. She continues to have ulcers to her feet, left worse than right. She continues to participate in a home wound care program. She denies any other health changes. Her pain today is 6/10.    Interval History 12/17/2019:  The patient returns to clinic today for follow up. She reports improving foot pain. She reports improved healing ulcers to her left foot. She continues to report right foot pain that is burning and tingling in nature. She continues to participate in a home wound care routine. She continues to take Lyrica. She asks about decreasing this. She denies any other health  changes. Her pain today is 5/10.    Interval History 9/17/2019:  The patient returns to clinic today for follow up. She continues to report bilateral foot pain that is burning and tingling in nature. Her pain is worse with sitting and at night. She continues to have slow healing ulcers to both feet. She continues to perform a home wound care program. She is no longer taking Gabapentin which was previously called in. She is now taking Pregabalin generic with benefit. She denies any other health changes. Her pain today is 4/10.    Interval History 6/13/2019:  The patient returns to clinic for follow up for bilateral foot pain. She continues to report bilateral foot pain that is burning in nature. She continues to have slow healing wounds to both feet from ulcers. She continues to take Lyrica once daily but reports increased pain. She continues to take Vitamin B12. She denies any other health changes. Her pain today is 8/10.    Interval History 3/13/2019:  The patient returns to clinic today for follow up. She continues to report bilateral foot pain that is burning and tingling in nature. Her pain is worse with prolonged walking and standing. She continues to have bilateral foot wounds. She reports that these wounds have significantly improved since last visit. She is currently taking Vitamin B12 with benefit. She continues to report benefit with Lyrica. She is currently taking this once daily. She denies any other health changes. Her pain today is 5/10.    Interval History 2/6/2019:  The patient returns to clinic today for follow up. She reports that her bilateral foot wounds have significantly improved since last visit. She does report some significant improvement in her foot pain. She reports intermittent bilateral foot pain especially with prolonged walking. She describes this pain as heavy and tingling in nature. She is no longer taking Celebrex. She is currently taking Lyrica 150 mg BID with benefit. She does  report that the Lyrica is expensive for her. She denies any other health changes. Her pain today is 5/10.    Interval History 12/5/18:  Patient returns to clinic today for follow up. She reports that since increasing her medications, she is having significant improvement in pain during the day time. She is currently taking Lyrica 75mg TID and Celebrex 200mg TID. However, she states that the burning  And tingling pain in both her feet increase in the evening around 6 or 7pm. She is unable to sleep during the nights due to the pain. She is still wearing her bilateral boots due to non healing ulcers from her scleroderma.     Interval History 8/30/2018:  The patient returns to clinic today for follow up. She continues to report bilateral foot pain that is burning and tingling in nature. She reports that this pain is worse at night. She is currently taking Lyrica 75 mg BID with limited relief. She did not have any benefit with Mobic. She continues to take Aleve with some benefit. She continues to have nonhealing ulcers. She wears an ACE bandage to her right calf, as well as boots to her bilateral feet. She denies any other health changes. Her pain today is 7/10.     Interval History 7/11/2018:  Since previous encounter she has weaned from gabapentin to 600mg / day and did not notice significant worsening of pain, but was having somnelence from higher doses of the medication in the past.  We are weaning in an attempt to trial Lyrica as an alternative to gabapentin.  Tramadol causes significant sedation, limiting its use.  She has discontinued the use of the tramadol.  Additionally she does have benefit from anti-inflammatory medication, has been using aleve, has not trialed CANTRELL-2 inhibitors in the past.    Interval history 05/16/2018:      The patient has had x-rays of the lumbar spine which did not reveal any evidence of significant neuroforaminal stenosis with degenerative disc disease.  There is mild facet  arthropathy.  She has escalated her gabapentin and is currently taking 2100 mg of gabapentin per day without any noticeable improvement but daytime somnolence.  She continues to have nonhealing ulcers and topical pain cream is helping to a limited degree over her leg in areas where there is no skin disruption.    Initial encounter:    Yamel Shah presents to the clinic for the evaluation of foot pain. The pain started 2 years ago following ulcers and symptoms have been worsening.    Brief history: History of scleroderma    Pain Description:    The pain is located in the both feet in the area of slowly healing chronic foot ulcers which were partly from venous insufficiency and stasis associated with her scleroderma.  In her right lower extremity she describes radicular symptoms in the L4/5 distribution.    At BEST  5/10     At WORST  10/10 on the WORST day.      On average pain is rated as 8/10.     Today the pain is rated as 8/10    The pain is described as burning, sharp, shooting and constant       Symptoms interfere with daily activity, sleeping and work.     Exacerbating factors: Laying, Walking, Night Time, Morning and Getting out of bed/chair.      Mitigating factors medications.     Patient denies night fever/night sweats, urinary incontinence, bowel incontinence, significant weight loss, significant motor weakness and loss of sensations.  Patient denies any suicidal or homicidal ideations    Pain Medications:  Current:  Lyrica 300 mg daily  Zanaflex  Relafen  Tylenol #3    Tried in Past:  NSAIDs -Aleve, Mobic, Celebrex  TCA -Never  SNRI -Never  Anti-convulsants - Gabapentin, Lyrica  Muscle Relaxants -Never  Opioids-Tramadol    Physical Therapy/Home Exercise: no       report:  Reviewed and consistent with medication use as prescribed.    Pain Procedures: none    Chiropractor -never  Acupuncture - never  TENS unit -never  Spinal decompression -never  Joint replacement -never    Imaging:   Xray  Cervical Spine 12/6/2019:  FINDINGS:  Odontoid prevertebral soft tissues and posterior elements are intact.  Neural foramina are patent.  No fracture dislocation bone destruction seen.  There is mild DJD.     Impression:     Mild DJD.    X-ray lumbar spine 6/1/2016:  2 views: Alignment is normal. There is mild DJD. No fracture dislocation bone destruction seen.   Impression      Mild DJD.     Xray lumbar spine 4/2018:  COMPARISON:  June 2016    FINDINGS:  There is slight curvature of the lumbar spine.  The vertebral bodies are normally aligned and normal in height.  Mild disc space narrowing present at L5-S1.  There is mild facet degenerative change in the lower spine.  No significant osteophytic spurring present.  There is no change in alignment with flexion or extension.      Past Medical History:   Diagnosis Date    Abnormal Pap smear     Acid reflux     Allergy     Arthritis     Encounter for blood transfusion     GERD (gastroesophageal reflux disease)     Hiatal hernia 03/24/2023    History of migraine headaches     Hx of colonic polyp     Hypertension     Idiopathic neuropathy 07/20/2012    ILD (interstitial lung disease) 11/06/2013    Iron deficiency anemia 03/18/2014    MRSA carrier     Osteopenia     Pneumonia     Pulmonary fibrosis     Pulmonary hypertension     Raynaud's disease     Scleroderma, diffuse     Sjogren's syndrome     Vitamin D deficiency 11/14/2013     Past Surgical History:   Procedure Laterality Date    24 HOUR IMPEDANCE PH MONITORING OF ESOPHAGUS IN PATIENT NOT TAKING ACID REDUCING MEDICATIONS N/A 03/04/2020    Procedure: IMPEDANCE PH STUDY, ESOPHAGEAL, 24 HOUR, IN PATIENT NOT TAKING ACID REDUCING MEDICATION;  Surgeon: Annamaria Mendoza MD;  Location: [address]);  Service: Endoscopy;  Laterality: N/A;  OFF PPI/H2 Blocker   Motility Studies   Hold Narcotics x 1 days   Hold TCA x 1 days  2/26 - LVM attempting to confirm appt  2/27 - Confirmed appt    ANORECTAL MANOMETRY N/A  05/10/2021    Procedure: MANOMETRY, ANORECTAL with balloon expulsion test;  Surgeon: Annamaria Mendoza MD;  Location: [facility] ENDO (4TH FLR);  Service: Endoscopy;  Laterality: N/A;  order combined  covid test 5/7 Hindu, instructions emailed-[facility]    BREAST BIOPSY      Left, benign    BRONCHOSCOPY N/A 10/2/2023    Procedure: bronch;  Surgeon: Roberta Leigh DO;  Location: [facility] OR 2ND FLR;  Service: Endoscopy;  Laterality: N/A;    BRONCHOSCOPY N/A 9/16/2024    Procedure: Flexible bronchoscopy (BAL only);  Surgeon: Roberta Leigh DO;  Location: [facility] OR 2ND FLR;  Service: Endoscopy;  Laterality: N/A;    CERVICAL CONIZATION   W/ LASER  1970    COLONOSCOPY      COLONOSCOPY N/A 03/29/2019    Procedure: COLONOSCOPY;  Surgeon: Annamaria Mendoza MD;  Location: [facility] ENDO (2ND FLR);  Service: Endoscopy;  Laterality: N/A;    COLONOSCOPY N/A 05/10/2021    Procedure: COLONOSCOPY;  Surgeon: Annamaria Mendoza MD;  Location: [facility] ENDO (4TH FLR);  Service: Endoscopy;  Laterality: N/A;  2nd floor-previous scopes done on 2nd floor, gastroparesis  full liquid diet x2 days, clear liquid x1 day prior to procedure  covid test 5/7 Hindu, instructions emailed-[facility]  5/6 pt confirmed appt-[facility]    DILATION AND CURETTAGE OF UTERUS      ESOPHAGEAL MANOMETRY WITH MEASUREMENT OF IMPEDANCE N/A 03/04/2020    Procedure: MANOMETRY, ESOPHAGUS, WITH IMPEDANCE MEASUREMENT;  Surgeon: Annamaria Mendoza MD;  Location: [facility] ENDO (4TH FLR);  Service: Endoscopy;  Laterality: N/A;  OFF PPI/H2 Blocker   Motility Studies   Hold Narcotics x 1 days   Hold TCA x 1 days    ESOPHAGOGASTRODUODENOSCOPY      ESOPHAGOGASTRODUODENOSCOPY N/A 03/29/2019    Procedure: EGD (ESOPHAGOGASTRODUODENOSCOPY);  Surgeon: Annamaria Mendoza MD;  Location: [facility] ENDO (2ND FLR);  Service: Endoscopy;  Laterality: N/A;  pulmonary htn    ESOPHAGOGASTRODUODENOSCOPY N/A 02/02/2021    Procedure: ESOPHAGOGASTRODUODENOSCOPY (EGD);  Surgeon: Annamaria Mendoza MD;  Location: [address]  FLR);  Service: Endoscopy;  Laterality: N/A;  2nd floor gastroparesis  3 days full liquid diet and 1 day clears  covid test 1/30 primary care, instructions sent to [facility]    ESOPHAGOGASTRODUODENOSCOPY N/A 07/01/2022    Procedure: ESOPHAGOGASTRODUODENOSCOPY (EGD);  Surgeon: Annamaria Mendoza MD;  Location: [facility] FLR);  Service: Endoscopy;  Laterality: N/A;  2nd floor-gastroparesis  full liquid diet x3 days, clear liquid diet x1 day prior to procedure  fully vaccinated, instructions sent to [facility]  6/29-pt confirmed new arrival time-\[facility]\""    EXCISION, LESION, STOMACH, LAPAROSCOPIC N/A 03/23/2023    Procedure: XI ROBOTIC EXCISION, LESION, STOMACH;  Surgeon: Katherine Segura MD;  Location: [address];  Service: General;  Laterality: N/A;    EXCISIONAL BIOPSY, LYMPH NODE Right 05/26/2023    Procedure: EXCISIONAL BIOPSY, LYMPH NODE, INGUINAL;  Surgeon: Katherine Segura MD;  Location: [address];  Service: General;  Laterality: Right;    HYSTERECTOMY  1990    FRANDY (AUB, Fibroids), ovaries remain    REPAIR, HERNIA, UMBILICAL N/A 03/23/2023    Procedure: REPAIR, HERNIA, UMBILICAL;  Surgeon: Katherine Segura MD;  Location: [address];  Service: General;  Laterality: N/A;    RIGHT HEART CATHETERIZATION Right 01/05/2021    Procedure: INSERTION, CATHETER, RIGHT HEART;  Surgeon: Aureliano Sy MD;  Location: [facility]ATH [facility];  Service: Cardiology;  Laterality: Right;    RIGHT HEART CATHETERIZATION Right 03/16/2023    Procedure: INSERTION, CATHETER, RIGHT HEART;  Surgeon: Aureliano Sy MD;  Location: [facility] CATH [facility];  Service: Cardiology;  Laterality: Right;    ROBOT-ASSISTED LAPAROSCOPIC REPAIR OF INGUINAL HERNIA USING DA VERNELL XI Right 05/26/2023    Procedure: XI ROBOTIC REPAIR, HERNIA, INGUINAL, FEMORAL;  Surgeon: Katherine Segura MD;  Location: [address];  Service: General;  Laterality: Right;    ROBOT-ASSISTED REPAIR OF HIATAL HERNIA  USING DA VERNELL XI N/A 03/23/2023    Procedure: XI ROBOTIC REPAIR, HERNIA, HIATAL;  Surgeon: Katherine eSgura MD;  Location: [facility] 2ND FLR;  Service: General;  Laterality: N/A;    VARICOSE VEIN SURGERY      XI ROBOTIC GASTROPEXY N/A 03/23/2023    Procedure: XI ROBOTIC GASTROPEXY;  Surgeon: Kathernie Segura MD;  Location: [facility] OR 2ND FLR;  Service: General;  Laterality: N/A;    XI ROBOTIC PYLOROMYOTOMY N/A 03/23/2023    Procedure: XI ROBOTIC PYLOROMYOTOMY;  Surgeon: Katherine Segura MD;  Location: [facility] 2ND [facility];  Service: General;  Laterality: N/A;     Social History     Socioeconomic History    Marital status:      Spouse name: Lewis    Number of children: 4   Occupational History    Occupation: -retired     Employer: [employer]     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 No    Breast-feeding No   Social History Narrative         Social Drivers of Health     Financial Resource Strain: Low Risk  (3/26/2024)    Overall Financial Resource Strain (CARDIA)     Difficulty of Paying Living Expenses: Not hard at all   Food Insecurity: No Food Insecurity (3/26/2024)    Hunger Vital Sign     Worried About Running Out of Food in the Last Year: Never true     Ran Out of Food in the Last Year: Never true   Transportation Needs: No Transportation Needs (3/26/2024)    PRAPARE - Transportation     Lack of Transportation (Medical): No     Lack of Transportation (Non-Medical): No   Physical Activity: Insufficiently Active (3/26/2024)    Exercise Vital Sign     Days of  Exercise per Week: 1 day     Minutes of Exercise per Session: 10 min   Stress: No Stress Concern Present (3/26/2024)    [employer] [place] [employer] - Occupational Stress Questionnaire     Feeling of Stress : Only a little   Housing Stability: Low Risk  (3/26/2024)    Housing Stability Vital Sign     Unable to Pay for Housing in the Last Year: No     Number of Places Lived in the Last Year: 1     Unstable Housing in the Last Year: No     Family History   Problem Relation Name Age of Onset    Breast cancer Mother Tiki Singh     Cancer Mother Tiki Singh         Breast    Hypertension Father Madi     Diabetes Father Madi         Amputation of legs    Breast cancer Sister Brenda     Diabetes Sister Brenda     Arthritis Sister Brenda     Cancer Sister Brenda         Breast    Osteoarthritis Brother Luis     Diabetes Brother Luis     Arthritis Brother Luis     Birth defects Brother Luis         [place] at birth, recently had knee replacement surgery on left knee    No Known Problems Daughter      No Known Problems Daughter      No Known Problems Son      No Known Problems Son      Breast cancer Maternal Aunt Gege     Cancer Maternal Aunt Gege         Breast    Early death Sister Philomena     Melanoma Neg Hx      Colon cancer Neg Hx      Crohn's disease Neg Hx      Stomach cancer Neg Hx      Ulcerative colitis Neg Hx      Rectal cancer Neg Hx      Irritable bowel syndrome Neg Hx      Esophageal cancer Neg Hx      Celiac disease Neg Hx      Ovarian cancer Neg Hx      Liver cancer Neg Hx      Pancreatic cancer Neg Hx         Review of patient's allergies indicates:   Allergen Reactions    Sulfa (sulfonamide antibiotics)      Other reaction(s): Rash       Current Outpatient Medications   Medication Sig    acetaminophen-codeine 300-60mg (TYLENOL #4) 300-60 mg Tab Take 1 tablet by mouth every evening.    albuterol (VENTOLIN HFA) 90 mcg/actuation inhaler Inhale 2 puffs into the lungs every 6 (six) hours  as needed for Wheezing. Rescue    albuterol sulfate 2.5 mg/0.5 mL Nebu Take 2.5 mg by nebulization every 4 (four) hours as needed (shortness of breath/wheezing). Rescue    amoxicillin-pot clavulanate 250-62.5 mg/5ml (AUGMENTIN) 250-62.5 mg/5 mL suspension Take 5 mLs (250 mg total) by mouth once daily. Discard remainder after 10 days    DULoxetine (CYMBALTA) 30 MG capsule Take 1 capsule (30 mg total) by mouth once daily.    ergocalciferol (ERGOCALCIFEROL) 50,000 unit Cap TAKE 1 CAPSULE BY MOUTH EVERY 7 DAYS    furosemide (LASIX) 20 MG tablet Take 1 tablet (20 mg total) by mouth once daily.    levoFLOXacin (LEVAQUIN) 250 mg/10 mL Soln Take 20 mLs (500 mg total) by mouth once daily.    multivitamin capsule Take 1 capsule by mouth once daily.    mupirocin (BACTROBAN) 2 % ointment Apply topically once daily.    nabumetone (RELAFEN) 750 MG tablet Take 1 tablet (750 mg total) by mouth daily as needed for Pain.    nebulizer and compressor Rosemary Use as directed    NIFEdipine (ADALAT CC) 90 MG TbSR TAKE 1 TABLET BY MOUTH EVERY EVENING WITH 30 MG FOR A TOTAL OF 120MG    NIFEdipine (PROCARDIA-XL) 30 MG (OSM) 24 hr tablet TAKE 1 TABLET(30 MG) BY MOUTH EVERY DAY    nintedanib (OFEV) 100 mg Cap Take 100 mg by mouth 2 (two) times a day.    pravastatin (PRAVACHOL) 20 MG tablet TAKE 1 TABLET(20 MG) BY MOUTH EVERY EVENING    PREVIDENT 5000 BOOSTER PLUS 1.1 % Pste SMARTSIG:To Teeth    PREVIDENT 5000 SENSITIVE 1.1-5 % Pste BRUSH FOR 2 MINUTES TWICE PER DAY    RABEprazole (ACIPHEX) 20 mg tablet Take 1 tablet (20 mg total) by mouth 2 (two) times daily.    tadalafil (ADCIRCA) 20 mg Tab Take 2 tablets (40 mg total) by mouth once daily.    tiZANidine (ZANAFLEX) 4 MG tablet Take 2 tablets (8 mg total) by mouth 2 (two) times daily as needed (muscle pain).    mycophenolate mofetil (CELLCEPT) 200 mg/mL SusR Take 5 mLs (1,000 mg total) by mouth 2 (two) times daily.    pregabalin (LYRICA) 300 MG Cap Take 1 capsule (300 mg total) by mouth 2 (two)  times daily.     No current facility-administered medications for this visit.     Facility-Administered Medications Ordered in Other Visits   Medication    fentaNYL 50 mcg/mL injection 25 mcg    haloperidol lactate injection 0.5 mg    sodium chloride 0.9% flush 10 mL       REVIEW OF SYSTEMS:    GENERAL:  No weight loss, malaise or fevers.  HEENT:   No recent changes in vision or hearing  NECK:  Negative for lumps,  difficulty with swallowing associated with scleroderma.  RESPIRATORY:  Negative for cough, wheezing or shortness of breath, patient denies any recent URI. Albuterol (history of ILD)  CARDIOVASCULAR:  Negative for chest pain, leg swelling or palpitations.  GI:  Negative for abdominal discomfort, blood in stools or black stools or change in bowel habits. GERD controlled zantac  MUSCULOSKELETAL:  See HPI.  SKIN:   Chronic low healing venous stasis ulcerations to bilateral lower extremities   PSYCH:  No mood disorder or recent psychosocial stressors.  Patients sleep is not disturbed secondary to pain.  HEMATOLOGY/LYMPHOLOGY:   History of iron deficiency anemia, takes daily iron supplementation.    ENDO: No history of diabetes or thyroid dysfunction  NEURO:   No history of headaches, syncope, paralysis, seizures or tremors.  All other reviewed and negative other than HPI.    OBJECTIVE:      BP (!) 111/58 (Patient Position: Sitting)   Pulse 77   Temp 97.3 °F (36.3 °C)   Wt 64.4 kg (141 lb 15.6 oz)   SpO2 99%   BMI 23.63 kg/m²     PHYSICAL EXAMINATION:    GENERAL: Well appearing, in no acute distress, alert and oriented x3.  PSYCH:  Mood and affect appropriate.  SKIN: Tight skin associated with scleroderma. Wearing shoes today.   HEAD/FACE:  Normocephalic, atraumatic. Cranial nerves grossly intact.  CV: RRR with palpation of the radial artery.  PULM: No evidence of respiratory difficulty, symmetric chest rise.  EXTREMITIES: Contractures over on bilateral hands with ulcerations to knuckles.    MUSCULOSKELETAL:   Bilateral lower extremity strength testing limited secondary to pain in the feet.    NEURO:  Decreased sensation to BLE.   GAIT: Antalgic, ambulates without assistance       ASSESSMENT: 73 y.o. year old female with pain, consistent with the following:    Encounter Diagnoses   Name Primary?    Chronic pain disorder Yes    Cutaneous scleroderma     Idiopathic neuropathy     Myofascial pain     Encounter for monitoring opioid maintenance therapy        PLAN:     - Previous records and imaging reviewed.  Labs reviewed.     - Continue Lyrica 300 mg BID. Refill provided.     - Continue Relafen. Refill provided.     - Continue Zanaflex 8 mg at bedtime. Refill provided.     - Pain Contract signed 8/26/2022.     - Continue Tylenol #3 once daily PRN. Refill provided.    - The patient is here today for a refill of current pain medications and they believe these provide effective pain control and improvements in quality of life.  The patient notes no serious side effects, and feels the benefits outweigh the risks.  The patient was reminded of the pain contract that they signed previously as well as the risks and benefits of the medication including possible death.  The updated [place] [employer] prescription monitoring program was reviewed, and the patient has been found to be compliant with current treatment plan. Medication management provided by Dr. Hinson.     - UDS on 7/30/2024 reviewed and consistent.     - Continue home exercise routine.     - RTC in 3 months or sooner if needed.    The above plan and management options were discussed at length with patient. Patient is in agreement with the above and verbalized understanding.     Vikotriya Camara NP  11/05/2024

## 2024-11-07 ENCOUNTER — TELEPHONE (OUTPATIENT)
Dept: PODIATRY | Facility: CLINIC | Age: 74
End: 2024-11-07
Payer: MEDICARE

## 2024-11-07 ENCOUNTER — TELEPHONE (OUTPATIENT)
Dept: TRANSPLANT | Facility: CLINIC | Age: 74
End: 2024-11-07
Payer: MEDICARE

## 2024-11-07 NOTE — TELEPHONE ENCOUNTER
Spoke with patient to help with scheduling appointment patient voice understanding to conversation.

## 2024-11-07 NOTE — TELEPHONE ENCOUNTER
----- Message from Roberta Leigh DO sent at 11/7/2024  3:24 PM CST -----  Nothing further.  Will see how she is doing at her clinic visit.  ----- Message -----  From: Johanna Fernandez  Sent: 10/30/2024   1:46 PM CST  To: Roberta Leigh, DO    Please advise if any further action needed re: CT chest 10/25/24. Will follow up with you in person on 11/12/24.

## 2024-11-11 ENCOUNTER — TELEPHONE (OUTPATIENT)
Dept: TRANSPLANT | Facility: CLINIC | Age: 74
End: 2024-11-11
Payer: MEDICARE

## 2024-11-12 ENCOUNTER — OFFICE VISIT (OUTPATIENT)
Dept: TRANSPLANT | Facility: CLINIC | Age: 74
End: 2024-11-12
Payer: MEDICARE

## 2024-11-12 ENCOUNTER — TELEPHONE (OUTPATIENT)
Dept: RHEUMATOLOGY | Facility: CLINIC | Age: 74
End: 2024-11-12
Payer: MEDICARE

## 2024-11-12 VITALS
OXYGEN SATURATION: 98 % | SYSTOLIC BLOOD PRESSURE: 136 MMHG | HEART RATE: 69 BPM | HEIGHT: 65 IN | RESPIRATION RATE: 18 BRPM | WEIGHT: 142 LBS | BODY MASS INDEX: 23.66 KG/M2 | DIASTOLIC BLOOD PRESSURE: 62 MMHG | TEMPERATURE: 99 F

## 2024-11-12 DIAGNOSIS — J84.9 ILD (INTERSTITIAL LUNG DISEASE): Primary | ICD-10-CM

## 2024-11-12 DIAGNOSIS — K21.9 GASTROESOPHAGEAL REFLUX DISEASE, UNSPECIFIED WHETHER ESOPHAGITIS PRESENT: ICD-10-CM

## 2024-11-12 DIAGNOSIS — I27.21 WHO GROUP 1 PULMONARY ARTERIAL HYPERTENSION: ICD-10-CM

## 2024-11-12 DIAGNOSIS — M34.9 SCLERODERMA WITH PULMONARY INVOLVEMENT: ICD-10-CM

## 2024-11-12 PROCEDURE — 99215 OFFICE O/P EST HI 40 MIN: CPT | Mod: PBBFAC,TXP | Performed by: PHYSICIAN ASSISTANT

## 2024-11-12 PROCEDURE — 99999 PR PBB SHADOW E&M-EST. PATIENT-LVL V: CPT | Mod: PBBFAC,TXP,, | Performed by: PHYSICIAN ASSISTANT

## 2024-11-12 NOTE — H&P (VIEW-ONLY)
ADVANCED LUNG DISEASE CLINIC FOLLOW-UP                                                                                                                                             Reason for Visit:  SSc-ILD    Referring Physician: Roberta Leigh, *    History of Present Illness: Yamel Shah is a 73 y.o. female who is on 0L of oxygen.  She is on no assisted ventilation.  Her New York Heart Association Class is II and a Karnofsky score of 90% - Able to carry on normal activity: minor symptoms of disease. She is not diabetic.    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 No exacerbations or further episodes of hemoptysis since her last clinic visit. Completed course of Augmentin after undergoing bronchoscopy - cytology and cultures negative. Overall, patient reports interval improvement in her respiratory symptoms and feels she is back to her baseline. Follows with podiatry for management of chronic ulcers. Remains on MMF, lasix, OFEV and Adcirca. Tolerating well.       Past Medical History:   Diagnosis Date    Abnormal Pap smear     Acid reflux     Allergy     Arthritis     Encounter for blood transfusion     GERD (gastroesophageal reflux disease)     Hiatal hernia 03/24/2023    History of migraine headaches     Hx of colonic polyp     Hypertension     Idiopathic neuropathy 07/20/2012    ILD (interstitial lung disease) 11/06/2013    Iron deficiency anemia 03/18/2014    MRSA carrier     Osteopenia     Pneumonia     Pulmonary fibrosis     Pulmonary hypertension     Raynaud's disease     Scleroderma, diffuse     Sjogren's syndrome     Vitamin D deficiency 11/14/2013       Past Surgical History:   Procedure Laterality Date    24 HOUR IMPEDANCE PH MONITORING OF ESOPHAGUS IN PATIENT NOT TAKING ACID REDUCING MEDICATIONS N/A 03/04/2020    Procedure: IMPEDANCE PH STUDY, ESOPHAGEAL, 24 HOUR, IN PATIENT NOT TAKING ACID REDUCING MEDICATION;  Surgeon: Annamaria Mendoza MD;  Location: [address]  FLR);  Service: Endoscopy;  Laterality: N/A;  OFF PPI/H2 Blocker   Motility Studies   Hold Narcotics x 1 days   Hold TCA x 1 days  2/26 - LVM attempting to confirm appt  2/27 - Confirmed appt    ANORECTAL MANOMETRY N/A 05/10/2021    Procedure: MANOMETRY, ANORECTAL with balloon expulsion test;  Surgeon: Annamaria Mendoza MD;  Location: [facility] (4TH FLR);  Service: Endoscopy;  Laterality: N/A;  order combined  covid test 5/7 Jain, instructions emailed-[facility]    BREAST BIOPSY      Left, benign    BRONCHOSCOPY N/A 10/2/2023    Procedure: bronch;  Surgeon: Roberta Leigh DO;  Location: [facility] OR 2ND FLR;  Service: Endoscopy;  Laterality: N/A;    BRONCHOSCOPY N/A 9/16/2024    Procedure: Flexible bronchoscopy (BAL only);  Surgeon: Roberta Leigh DO;  Location: [facility] OR 2ND FLR;  Service: Endoscopy;  Laterality: N/A;    CERVICAL CONIZATION   W/ LASER  1970    COLONOSCOPY      COLONOSCOPY N/A 03/29/2019    Procedure: COLONOSCOPY;  Surgeon: Annamaria Mendoza MD;  Location: [facility] (2ND FLR);  Service: Endoscopy;  Laterality: N/A;    COLONOSCOPY N/A 05/10/2021    Procedure: COLONOSCOPY;  Surgeon: Annamaria Mendoza MD;  Location: [facility] (4TH FLR);  Service: Endoscopy;  Laterality: N/A;  2nd floor-previous scopes done on 2nd floor, gastroparesis  full liquid diet x2 days, clear liquid x1 day prior to procedure  covid test 5/7 Jain, instructions emailed-[facility]  5/6 pt confirmed appt-[facility]    DILATION AND CURETTAGE OF UTERUS      ESOPHAGEAL MANOMETRY WITH MEASUREMENT OF IMPEDANCE N/A 03/04/2020    Procedure: MANOMETRY, ESOPHAGUS, WITH IMPEDANCE MEASUREMENT;  Surgeon: Annamaria Mendoza MD;  Location: [facility] ENDO (4TH FLR);  Service: Endoscopy;  Laterality: N/A;  OFF PPI/H2 Blocker   Motility Studies   Hold Narcotics x 1 days   Hold TCA x 1 days    ESOPHAGOGASTRODUODENOSCOPY      ESOPHAGOGASTRODUODENOSCOPY N/A 03/29/2019    Procedure: EGD (ESOPHAGOGASTRODUODENOSCOPY);  Surgeon: Annamaria Mendoza MD;  [facility]  Service: Endoscopy;  Laterality: N/A;  pulmonary htn    ESOPHAGOGASTRODUODENOSCOPY N/A 02/02/2021    Procedure: ESOPHAGOGASTRODUODENOSCOPY (EGD);  Surgeon: Annamaria Mendoza MD;  [facility]  Service: Endoscopy;  Laterality: N/A;  2nd floor gastroparesis  3 days full liquid diet and 1 day clears  covid test 1/30 primary care, instructions sent to [facility]    ESOPHAGOGASTRODUODENOSCOPY N/A 07/01/2022    Procedure: ESOPHAGOGASTRODUODENOSCOPY (EGD);  Surgeon: Annamaria Mendoza MD;  [facility]  Service: Endoscopy;  Laterality: N/A;  2nd floor-gastroparesis  full liquid diet x3 days, clear liquid diet x1 day prior to procedure  fully vaccinated, instructions sent to [facility]  6/29-pt confirmed new arrival time-[facility]    EXCISION, LESION, STOMACH, LAPAROSCOPIC N/A 03/23/2023    Procedure: XI ROBOTIC EXCISION, LESION, STOMACH;  Surgeon: Katherine Segura MD;  Location: [address];  Service: General;  Laterality: N/A;    EXCISIONAL BIOPSY, LYMPH NODE Right 05/26/2023    Procedure: EXCISIONAL BIOPSY, LYMPH NODE, INGUINAL;  Surgeon: Katherine Segura MD;  Location: [address];  Service: General;  Laterality: Right;    HYSTERECTOMY  1990    FRANDY (AUB, Fibroids), ovaries remain    REPAIR, HERNIA, UMBILICAL N/A 03/23/2023    Procedure: REPAIR, HERNIA, UMBILICAL;  Surgeon: Katherine Segura MD;  Location: [address];  Service: General;  Laterality: N/A;    RIGHT HEART CATHETERIZATION Right 01/05/2021    Procedure: INSERTION, CATHETER, RIGHT HEART;  Surgeon: Aureliano Sy MD;  Location: [facility] CATH LAB;  Service: Cardiology;  Laterality: Right;    RIGHT HEART CATHETERIZATION Right 03/16/2023    Procedure: INSERTION, CATHETER, RIGHT HEART;  Surgeon: Aureliano Sy MD;  Location: [facility] CATH LAB;  Service: Cardiology;  Laterality: Right;    ROBOT-ASSISTED LAPAROSCOPIC REPAIR OF INGUINAL HERNIA USING DA VERNELL XI Right  05/26/2023    Procedure: XI ROBOTIC REPAIR, HERNIA, INGUINAL, FEMORAL;  Surgeon: Katherine Segura MD;  Location: [facility] OR 2ND FLR;  Service: General;  Laterality: Right;    ROBOT-ASSISTED REPAIR OF HIATAL HERNIA USING DA VERNELL XI N/A 03/23/2023    Procedure: XI ROBOTIC REPAIR, HERNIA, HIATAL;  Surgeon: Katherine Segura MD;  Location: [facility] OR 2ND FLR;  Service: General;  Laterality: N/A;    VARICOSE VEIN SURGERY      XI ROBOTIC GASTROPEXY N/A 03/23/2023    Procedure: XI ROBOTIC GASTROPEXY;  Surgeon: Katherine Segura MD;  Location: [facility] OR 2ND FLR;  Service: General;  Laterality: N/A;    XI ROBOTIC PYLOROMYOTOMY N/A 03/23/2023    Procedure: XI ROBOTIC PYLOROMYOTOMY;  Surgeon: Katherine Segura MD;  Location: [facility] OR 2ND FLR;  Service: General;  Laterality: N/A;       Allergies: Spironolactone, Sulfa (sulfonamide antibiotics), and Tramadol    Current Outpatient Medications   Medication Sig    acetaminophen-codeine 300-60mg (TYLENOL #4) 300-60 mg Tab Take 1 tablet by mouth every evening.    albuterol (VENTOLIN HFA) 90 mcg/actuation inhaler Inhale 2 puffs into the lungs every 6 (six) hours as needed for Wheezing. Rescue    albuterol sulfate 2.5 mg/0.5 mL Nebu Take 2.5 mg by nebulization every 4 (four) hours as needed (shortness of breath/wheezing). Rescue    amoxicillin-pot clavulanate 250-62.5 mg/5ml (AUGMENTIN) 250-62.5 mg/5 mL suspension Take 5 mLs (250 mg total) by mouth once daily. Discard remainder after 10 days    DULoxetine (CYMBALTA) 60 MG capsule Take 1 capsule (60 mg total) by mouth once daily.    ergocalciferol (ERGOCALCIFEROL) 50,000 unit Cap TAKE 1 CAPSULE BY MOUTH EVERY 7 DAYS    furosemide (LASIX) 20 MG tablet Take 1 tablet (20 mg total) by mouth once daily.    levoFLOXacin (LEVAQUIN) 250 mg/10 mL Soln Take 20 mLs (500 mg total) by mouth once daily.    multivitamin capsule Take 1 capsule by mouth once daily.    mupirocin (BACTROBAN) 2 % ointment Apply topically once  daily.    mycophenolate mofetil (CELLCEPT) 200 mg/mL SusR Take 5 mLs (1,000 mg total) by mouth 2 (two) times daily.    nabumetone (RELAFEN) 750 MG tablet Take 1 tablet (750 mg total) by mouth daily as needed for Pain.    nebulizer and compressor Rosemary Use as directed    NIFEdipine (ADALAT CC) 90 MG TbSR TAKE 1 TABLET BY MOUTH EVERY EVENING WITH 30 MG FOR A TOTAL OF 120MG    NIFEdipine (PROCARDIA-XL) 30 MG (OSM) 24 hr tablet TAKE 1 TABLET(30 MG) BY MOUTH EVERY DAY    nintedanib (OFEV) 100 mg Cap Take 100 mg by mouth 2 (two) times a day.    pravastatin (PRAVACHOL) 20 MG tablet TAKE 1 TABLET(20 MG) BY MOUTH EVERY EVENING    pregabalin (LYRICA) 300 MG Cap Take 1 capsule (300 mg total) by mouth 2 (two) times daily.    PREVIDENT 5000 BOOSTER PLUS 1.1 % Pste SMARTSIG:To Teeth    PREVIDENT 5000 SENSITIVE 1.1-5 % Pste BRUSH FOR 2 MINUTES TWICE PER DAY    RABEprazole (ACIPHEX) 20 mg tablet Take 1 tablet (20 mg total) by mouth 2 (two) times daily.    tadalafil (ADCIRCA) 20 mg Tab Take 2 tablets (40 mg total) by mouth once daily.    tiZANidine (ZANAFLEX) 4 MG tablet Take 2 tablets (8 mg total) by mouth 2 (two) times daily as needed (muscle pain).     No current facility-administered medications for this visit.     Facility-Administered Medications Ordered in Other Visits   Medication    fentaNYL 50 mcg/mL injection 25 mcg    haloperidol lactate injection 0.5 mg    sodium chloride 0.9% flush 10 mL       Immunization History   Administered Date(s) Administered    COVID-19 MRNA, LN-S PF (MODERNA HALF 0.25 ML DOSE) 04/21/2022    COVID-19, MRNA, LN-S, PF (MODERNA FULL 0.5 ML DOSE) 02/09/2021, 03/12/2021, 09/21/2021    COVID-19, MRNA, LN-S, PF (Pfizer) (Purple Cap) 10/13/2022    COVID-19, mRNA, LNP-S, PF (Moderna) Ages 12+ 10/18/2023, 05/02/2024    COVID-19, mRNA, LNP-S, PF, rosi-sucrose, 30 mcg/0.3 mL (Pfizer Ages 12+) 11/07/2024    COVID-19, mRNA, LNP-S, bivalent booster, PF (Moderna Omicron)12 + YEARS 10/13/2022, 10/13/2022     Influenza 11/02/2015, 09/21/2021, 09/22/2023    Influenza (FLUAD) - Quadrivalent - Adjuvanted - PF *Preferred* (65+) 09/11/2020, 09/12/2022, 09/22/2023    Influenza - Quadrivalent - PF *Preferred* (6 months and older) 11/03/2006, 10/08/2007, 09/29/2009, 09/26/2014, 09/26/2014, 11/02/2015, 11/02/2015, 09/27/2016    Influenza - Trivalent - Afluria, Fluzone MDV 11/03/2006, 10/08/2007, 09/29/2009, 09/26/2014    Influenza - Trivalent - Fluad - Adjuvanted - PF (65 years and older 09/13/2024    Influenza - Trivalent - Fluarix, Flulaval, Fluzone, Afluria - PF 11/02/2015    Influenza - Trivalent - Fluzone High Dose - PF (65 years and older) 10/09/2017, 10/11/2017, 10/27/2018, 09/09/2019    Influenza Split 11/03/2006, 10/08/2007, 09/29/2009, 09/26/2014    Pneumococcal Conjugate - 13 Valent 01/16/2013, 01/16/2013    Pneumococcal Polysaccharide - 23 Valent 09/27/2016, 11/12/2021    RSVpreF (Arexvy) 10/27/2023    Tdap 02/06/2019    Zoster 01/16/2013    Zoster Recombinant 11/30/2018, 02/12/2019     Family History:    Family History   Problem Relation Name Age of Onset    Breast cancer Mother Tiki Singh     Cancer Mother Tiki Singh         Breast    Hypertension Father Madi     Diabetes Father Madi         Amputation of legs    Breast cancer Sister Brenda     Diabetes Sister Brenda     Arthritis Sister Brenda     Cancer Sister Brenda         Breast    Osteoarthritis Brother Luis     Diabetes Brother Luis     Arthritis Brother Luis     Birth defects Brother Luis Harvey at birth, recently had knee replacement surgery on left knee    No Known Problems Daughter      No Known Problems Daughter      No Known Problems Son      No Known Problems Son      Breast cancer Maternal Aunt Gege     Cancer Maternal Aunt Gege         Breast    Early death Sister Philomena     Melanoma Neg Hx      Colon cancer Neg Hx      Crohn's disease Neg Hx      Stomach cancer Neg Hx      Ulcerative colitis Neg Hx      Rectal cancer Neg Hx       Irritable bowel syndrome Neg Hx      Esophageal cancer Neg Hx      Celiac disease Neg Hx      Ovarian cancer Neg Hx      Liver cancer Neg Hx      Pancreatic cancer Neg Hx       Social History     Substance and Sexual Activity   Alcohol Use Not Currently    Comment: wine occasionally      Social History     Substance and Sexual Activity   Drug Use No      Social History     Socioeconomic History    Marital status:      Spouse name: Lewis    Number of children: 4   Occupational History    Occupation: -retired     Employer: [employer]     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 No    Breast-feeding No   Social History Narrative         Social Drivers of Health     Financial Resource Strain: Low Risk  (3/26/2024)    Overall Financial Resource Strain (CARDIA)     Difficulty of Paying Living Expenses: Not hard at all   Food Insecurity: No Food Insecurity (3/26/2024)    Hunger Vital Sign     Worried About Running Out of Food in the Last Year: Never true     Ran Out of Food in the Last Year: Never true   Transportation Needs: No Transportation Needs (3/26/2024)    PRAPARE - Transportation     Lack of Transportation (Medical): No     Lack of Transportation (Non-Medical): No   Physical Activity: Insufficiently Active (3/26/2024)    Exercise Vital Sign     Days of Exercise per Week: 1 day     Minutes of Exercise per Session: 10 min   Stress: No Stress Concern Present (3/26/2024)    [employer] [place] [employer] - Occupational Stress Questionnaire     Feeling of Stress : Only a  little   Housing Stability: Low Risk  (3/26/2024)    Housing Stability Vital Sign     Unable to Pay for Housing in the Last Year: No     Number of Places Lived in the Last Year: 1     Unstable Housing in the Last Year: No     Review of Systems   Constitutional:  Negative for chills, diaphoresis, fever, malaise/fatigue and weight loss.   HENT:  Negative for congestion, ear discharge, ear pain, hearing loss, nosebleeds, sinus pain, sore throat and tinnitus.    Eyes:  Negative for blurred vision, double vision, photophobia, pain, discharge and redness.   Respiratory:  Positive for shortness of breath (heavy exertion only). Negative for cough, hemoptysis, sputum production, wheezing and stridor.    Cardiovascular:  Negative for chest pain, palpitations, orthopnea, claudication, leg swelling and PND.   Gastrointestinal:  Positive for heartburn. Negative for abdominal pain, blood in stool, constipation, diarrhea, melena, nausea and vomiting.   Genitourinary:  Negative for dysuria, flank pain, frequency, hematuria and urgency.   Musculoskeletal:  Negative for back pain, falls, joint pain, myalgias and neck pain.   Skin:  Negative for itching and rash.   Neurological:  Negative for dizziness, tingling, tremors, sensory change, speech change, focal weakness, seizures, loss of consciousness, weakness and headaches.   Endo/Heme/Allergies:  Negative for environmental allergies and polydipsia. Does not bruise/bleed easily.   Psychiatric/Behavioral:  Negative for depression, hallucinations, memory loss, substance abuse and suicidal ideas. The patient is not nervous/anxious and does not have insomnia.      Vitals  There were no vitals taken for this visit.  Physical Exam  Vitals and nursing note reviewed.   Constitutional:       General: She is not in acute distress.     Appearance: She is well-developed. She is not diaphoretic.   HENT:      Head: Normocephalic and atraumatic.      Nose: Nose normal.      Mouth/Throat:       Pharynx: No oropharyngeal exudate.   Eyes:      General: No scleral icterus.     Conjunctiva/sclera: Conjunctivae normal.      Pupils: Pupils are equal, round, and reactive to light.   Neck:      Thyroid: No thyromegaly.      Vascular: No JVD.      Trachea: No tracheal deviation.   Cardiovascular:      Rate and Rhythm: Normal rate and regular rhythm.      Heart sounds: Normal heart sounds. No murmur heard.     No friction rub. No gallop.   Pulmonary:      Effort: Pulmonary effort is normal. No respiratory distress.      Breath sounds: Wheezing and rales present.      Comments: Diffuse expiratory squeaks  Abdominal:      General: Bowel sounds are normal. There is no distension.      Palpations: Abdomen is soft.      Tenderness: There is no abdominal tenderness.   Musculoskeletal:         General: No deformity. Normal range of motion.      Cervical back: Normal range of motion and neck supple.   Skin:     General: Skin is warm and dry.      Capillary Refill: Capillary refill takes less than 2 seconds.      Coloration: Skin is not pale.      Findings: No erythema or rash.      Comments: Skin tightening.  Digital ulcers and nailbed deformities.  Bilateral foot ulcers in dry intact dressing     Neurological:      Mental Status: She is alert and oriented to person, place, and time.      Cranial Nerves: No cranial nerve deficit.   Psychiatric:         Judgment: Judgment normal.         Labs:          8/22/2024     2:13 PM 3/4/2024     1:35 PM 9/18/2023    11:06 AM 8/17/2023     1:27 PM 5/23/2023     9:20 AM 10/7/2022    12:56 PM 1/3/2022    10:43 AM   Pulmonary Function Tests   FVC 1.77 liters 1.81 liters 1.76 liters 1.79 liters 1.9 liters 1.82 liters 1.83 liters   FEV1 1.46 liters 1.43 liters 1.46 liters 1.42 liters 1.58 liters 1.49 liters 1.44 liters   TLC (liters) 2.99 liters 3.28 liters 2.7 liters 3.11 liters 3.41 liters 3.2 liters    DLCO (ml/mmHg sec) 10.7 ml/mmHg sec 10.62 ml/mmHg sec 9.15 ml/mmHg sec 10.8 ml/mmHg  sec 11.37 ml/mmHg sec 11.89 ml/mmHg sec    FVC% 61.9 63 66.1 61.8 71.1 67 67   FEV1% 66.2 64 70.5 63.8 75.6 71 68   FEF 25-75 1.48 1.32 1.64 1.35 1.82 1.7    FEF 25-75% 58.4 72 93.5 73.2 103.5 95    TLC% 58.6 64 52.8 60.9 66.8 63    RV 1.18 1.33 0.94 1.32 1.46 1.33    RV% 55.2 62 44.2 62.2 68.5 63    DLCO% 49.5 49 42.1 49.7 52.3 54          8/22/2024    12:04 PM 4/29/2024     2:06 PM 3/4/2024    12:40 PM 9/18/2023    10:57 AM 8/17/2023     1:29 PM 5/22/2023     9:20 AM 2/28/2023    12:42 PM   6MW   6MWT Status completed without stopping completed without stopping completed without stopping completed without stopping completed without stopping completed without stopping completed without stopping   Patient Reported Leg pain Dyspnea;Leg pain Other (Comment) Dyspnea Dyspnea No complaints Other (Comment)   Was O2 used? No No No No No No No   6MW Distance walked (feet) 1275 feet 1532 feet 1562 feet 1450 feet 1300 feet 1500 feet 1500 feet   Distance walked (meters) 388.62 meters 466.95 meters 476.1 meters 441.96 meters 396.24 meters 457.2 meters 457.2 meters   Did patient stop? No No No No No No No   Oxygen Saturation 97 % 99 % 100 % 100 % 98 % 100 % 98 %   Supplemental Oxygen Room Air Room Air Room Air Room Air Room Air Room Air Room Air   Heart Rate 68 bpm 77 bpm 77 bpm 75 bpm 70 bpm 62 bpm 74 bpm   Blood Pressure 125/63 133/66 134/88 152/67 141/63 109/51 133/63   Ju Dyspnea Rating  somewhat heavy moderate light light light moderate moderate   Oxygen Saturation 97 % 97 % 96 % 98 % 96 % 98 % 94 %   Supplemental Oxygen Room Air Room Air Room Air Room Air Room Air Room Air Room Air   Heart Rate 86 bpm 98 bpm 111 bpm 102 bpm 98 bpm 90 bpm 113 bpm   Blood Pressure 133/56 167/63 156/80 168/71 159/70 136/62 169/76   Ju Dyspnea Rating  moderate somewhat heavy somewhat heavy moderate somewhat heavy somewhat heavy somewhat heavy   Recovery Time (seconds) 65 seconds 157 seconds 121 seconds 123 seconds 100 seconds 131 seconds  160 seconds   Oxygen Saturation 99 % 100 % 100 % 100 % 100 % 100 % 97 %   Supplemental Oxygen Room Air  Room Air Room Air Room Air Room Air Room Air   Heart Rate 78 bpm 88 bpm 84 bpm 90 bpm 82 bpm 71 bpm 84 bpm       Imaging:  Results for orders placed during the hospital encounter of 12/22/20    CT Chest Without Contrast    Narrative  EXAMINATION:  CT CHEST WITHOUT CONTRAST    CLINICAL HISTORY:  pulmonary hypertension; Pulmonary hypertension, unspecified    TECHNIQUE:  Using low dose technique the chest was surveyed from above the pulmonary apices through the posterior costophrenic angles.  Data was reconstructed for multiplanar images in axial, sagittal and coronal planes and for maximal intensity projection images in the axial plane.    All CT scans at this facility use dose modulation, iterative reconstruction and/or weight based dosing when appropriate to reduce radiation dose to as low as reasonably achievable.    Xray dose: DLP = 152.03 mGy-cm.    COMPARISON:  CT chest abdomen without contrast 03/13/2019.  CT chest without contrast 09/18/2015.    FINDINGS:  Base of Neck: No significant abnormality.    Aorta: Left-sided aortic arch with 3 arterial branches. The aorta maintains normal caliber, contour and course. There is mild calcification of the thoracic aorta or coronary arteries.    Heart/pericardium: Normal size.  No pericardial effusion or calcification.  Calcification of the aortic valve annulus.    Pulmonary vasculature: Pulmonary arteries distribute normally.  Pulmonary artery size is neither specific nor sensitive for normal or elevated pulmonary arterial pressures.    -There are four pulmonary veins.    Barbara/Mediastinum: No pathologic noelle enlargement.    Esophagus: The esophagus is mildly dilated with dependent fluid, similar to prior exam.    Upper Abdomen: No abnormality of the partially imaged upper abdomen.    Thoracic soft tissues: Normal. Both breasts are present.    Bones: No acute fracture.  No suspicious lytic or sclerotic lesion.    Airways: Patent.    Lungs/pleura:    -Stable biapical scarring.    -Redemonstration of cystic changes and reticulation plus subsegmental ground-glass disease, most extensive in the lower lung zones.  Radiographic appearance is more typical for NSIP but could represent UIP in this patient with scleroderma.    -There are stable scattered foci of tree-in-bud nodularities, for example within the superior segment of the right lower lobe (axial series 4, image 211).  There is associated dilatation of multiple adjacent small airways.    -0.4 cm solid pulmonary nodule in the apical segment of the right upper lobe (axial series 4, image 83), stable dating back to 2015.    -No pleural fluid or thickening.No pleural calcification. No pneumothorax.    Impression  1.  Overall stable appearing chronic interstitial lung disease consistent with patient's history of scleroderma.  Radiographic appearance is more typical for NSIP and UIP, noting that UIP is a more common disease.    2.  Previously characterized tree-in-bud opacity along the superior aspect of the right oblique fissure appears stable and may reflect chronic inflammatory process.    3.  0.4 cm solid right upper lobe pulmonary nodule, stable dating back to 09/18/2015.  Such stability favors benign etiology.    4.  Persistent fluid-filled esophagus as noted on multiple priors placing the patient at increased risk for aspiration.  This may be related to the patient's history of scleroderma; clinical considerations will determine if further assessment of esophageal motility is warranted.    Electronically signed by resident: Leobardo Arellano  Date:    12/22/2020  Time:    11:43    Electronically signed by: Maxine Del Cid MD  Date:    12/22/2020  Time:    14:23    Reading Physician Reading Date Result Priority   Bg Ferreira MD  [phone] 9/8/2023 Routine   Mikala Kruse MD  [phone] 9/8/2023       Narrative & Impression  EXAMINATION:  CT CHEST WITHOUT CONTRAST     CLINICAL HISTORY:  Lung nodule, 6-8mm;3 month follow up solitary pulmonary nodule; Abnormal findings on diagnostic imaging of other specified body structures     TECHNIQUE:  Low dose axial images, sagittal and coronal reformations were obtained from the thoracic inlet to the lung bases. Contrast was not administered.     All CT scans at this facility use dose modulation, iterative reconstruction and/or weight based dosing when appropriate to reduce rad iation dose to as low as reasonably achievable.     COMPARISON:  12/22/2020, 06/02/2023     FINDINGS:  No intravenous contrast was administered for this examination.  Therefore, it may have diminished sensitivity for detection of certain abnormalities.     Thyroid gland: Within normal limits.     Thoracic soft tissues: Unremarkable.     Mediastinum: No pathologically enlarged lymph nodes.     Cardiovascular: Normal heart size.No pericardial effusion.     Trachea: Within normal limits.     Lungs/pleura/airways:     Redemonstration of basilar and subpleural predominant reticulation and ground-glass with architectural distortion, volume loss, and traction bronchiectasis/bronchiolectasis.  Worsening of patchy areas of ground-glass bilaterally.  There is a straight edge sign present with sharply marginated basilar pseudo honeycombing likely representing clustered dilated bronchiectasis and cystic bronchiectasis.  Anterior upper lobe sign also noted.  These findings are grossly stable compared to CT 12/22/2020.     0.6 cm solid pulmonary nodule in the left upper lobe (4-193), 0.4 cm solid pulmonary nodule left upper lobe (4-212), right upper lobe 0.4 cm solid pulmonary nodule (4-89).  These nodules are unchanged from CT 06/02/2023, but new from CT 12/22/2020..     Esophagus: Esophagus is dilated and fluid-filled to the level of the aortic arch compatible with achalasia in the setting of scleroderma..      Upper abdomen:     Partially imaged.  No significant abnormalities.     Bones: Unremarkable for stated age.     Impression:     Redemonstration of pulmonary fibrosis with mild increased bilateral patchy areas of patchy ground-glass.  Findings favored represent as NSIP patient with history of scleroderma.     Solid pulmonary nodules measuring up to 0.6 cm as detailed above which are stable from CT 06/02/2023, but new from 12/22/2020.  This can be reassessed in the next follow-up     Stable patulous esophagus.     Electronically signed by resident: Mikala Kruse  Date:                                            09/08/2023  Time:                                           15:29     Electronically signed by:Bg Granado  Date:                                            09/08/2023  Time:                                           17:14    Cardiodiagnostics:  Results for orders placed during the hospital encounter of 12/29/21    Echo    Interpretation Summary  · The left ventricle is normal in size with normal systolic function.  · The estimated ejection fraction is 63%.  · Normal left ventricular diastolic function.  · Normal right ventricular size with normal right ventricular systolic function.  · Mild pulmonic regurgitation.  · Normal central venous pressure (3 mmHg).  · The estimated PA systolic pressure is 32 mmHg.  · Trivial posterior pericardial effusion.    Results for orders placed during the hospital encounter of 08/17/23    Echo    Interpretation Summary    Left Ventricle: The left ventricle is normal in size. Normal wall thickness. Normal wall motion. There is normal systolic function with a visually estimated ejection fraction of 60 - 65%. There is normal diastolic function.    Right Ventricle: Normal right ventricular cavity size. Wall thickness is normal. Right ventricle wall motion  is normal. Systolic function is normal.    Pulmonary Artery: The estimated pulmonary artery systolic pressure is  40 mmHg.      Assessment:  1. ILD (interstitial lung disease)    2. Scleroderma with pulmonary involvement    3. Gastroesophageal reflux disease, unspecified whether esophagitis present    4. WHO group 1 pulmonary arterial hypertension      Plan:     Followed for SSc-ILD with PAH. On MMF and OFEV. Previous FVC/DLCO was stable for the last two years. No testing for review today. Has known PH which is stable on Adcirca.     Continue to follow with GI for GERD/scleroderma esophagus. Encouraged continued sleeping on incline. Continue with PPI. Adequately controlled. Has follow up EGD/colonoscopy in 2025/2026.     Continue Adcirca and PH follow-up. Lasix increased per PH team for lower extremity edema since early August. Scheduled for repeat RHC at the end of the year.     Interval improvement in SHAUNA cavitary lesion. New RUL 4mm pulmonary nodule (new since September 2023). Will repeat CT chest in 3 months.     5. RTC in 3 months or sooner if needed. Repeat PFTs, CT chest at that time.       Claire Pierre PA-C  [facility]

## 2024-11-12 NOTE — LETTER
November 12, 2024        Luis Ahuja  [address]  [facility] [address]  Phone: [phone]  Fax: [phone]             [facility]  [address]  [facility] [address]  Phone: [phone]   Patient: Yamel Shah   MR Number: [id number]   YOB: 1950   Date of Visit: 11/12/2024       Dear Dr. Luis Ahuja    Thank you for referring Yamel Shah to me for evaluation. Attached you will find relevant portions of my assessment and plan of care.    If you have questions, please do not hesitate to call me. I look forward to following Yamel Shah along with you.    Sincerely,    FABRIZIO Ramososure    If you would like to receive this communication electronically, please contact [email or web address] or [phone] to request [employer] access.    [employer] is a tool which provides read-only access to select patient information with whom you have a relationship. Its easy to use and provides real time access to review your patients record including encounter summaries, notes, results, and demographic information.    If you feel you have received this communication in error or would no longer like to receive these types of communications, please e-mail [email or web address]

## 2024-11-13 ENCOUNTER — PATIENT MESSAGE (OUTPATIENT)
Dept: TRANSPLANT | Facility: CLINIC | Age: 74
End: 2024-11-13
Payer: MEDICARE

## 2024-11-13 DIAGNOSIS — M34.9 SCLERODERMA: ICD-10-CM

## 2024-11-13 DIAGNOSIS — Z78.0 MENOPAUSE: ICD-10-CM

## 2024-11-13 RX ORDER — NIFEDIPINE 30 MG/1
30 TABLET, EXTENDED RELEASE ORAL DAILY
Qty: 30 TABLET | Refills: 11 | OUTPATIENT
Start: 2024-11-13

## 2024-11-13 RX ORDER — MYCOPHENOLATE MOFETIL 200 MG/ML
1000 POWDER, FOR SUSPENSION ORAL 2 TIMES DAILY
Qty: 480 ML | Refills: 1 | Status: ACTIVE | OUTPATIENT
Start: 2024-11-13 | End: 2025-02-17

## 2024-11-13 NOTE — TELEPHONE ENCOUNTER
NN called the patient to clarify if the patient is on both doses of Nifedipine and if her pharmacy is asking for a refill. Patient did not answer the phone. Voicemail was left asking for the patient to return NN call at [phone].    NN called the patient's [facility] pharmacy to verify if the refill was needed as the patient is listed as being on two different prescriptions of Nifedipine. Pharmacist told NN that the patient is on the new dose of Nifedipine 30mg 24 hr tablet. Pharmacist stated the patient has over six months in refills.

## 2024-11-15 ENCOUNTER — PATIENT MESSAGE (OUTPATIENT)
Dept: TRANSPLANT | Facility: CLINIC | Age: 74
End: 2024-11-15
Payer: MEDICARE

## 2024-11-15 ENCOUNTER — TELEPHONE (OUTPATIENT)
Dept: TRANSPLANT | Facility: CLINIC | Age: 74
End: 2024-11-15
Payer: MEDICARE

## 2024-11-15 NOTE — TELEPHONE ENCOUNTER
NN spoke to Dr. Martins about patient's two Nifedipine medications. Dr. Martins wanted to review the patient's chart but did not think the patient should be on both of those medications. Message sent to Dr. Martins so she can look into the patients chart and advise according to the chart. NN called the patient left a voicemail telling the patient not to fill the two medications and to wait until she hears from NN. NN also sent a patient portal message/my chart asking the patient to not fill nor take both Nifedipine medications. NN gave call back number of [phone] in both the voicemail and my chart message.

## 2024-11-15 NOTE — TELEPHONE ENCOUNTER
NN called the patient's pharmacy to inquire about the patient's prescriptions for Nifedipine. Pharmacists stated that the patient's 90 mg tabs of Nifedipine has the new prescription since July or 2024. The 30 mg strength of the Nifedipine has not been filled since June of 2024. The pharmacists stated that the patient seems to be only on the one strength of Nifedipine 90 mg. NN discussed the patient's medications with SEAN Soler to ensure that the patient should be on both strengths of Nifedipine. SEAN Soler stated the patient has really bad Raynaud disease and could be on both strengths of Nifedipine. SEAN Soler and NN looked through the patient's chart and Dr. Pierre saw the patient on 11/12/24 who noted that the patient is on both strengths of Nifedipine.    @ 10:30 am  NN called the patient to clarify the medication regimen for the patient's Nifedipine. Patient stated she has been on both strengths of Nifedipine for years. Patient stated she ran out of the 30 mg of Nifedipine and is also needing a refill on the 90 mg of nifedipine. NN informed the patient that those prescriptions would be called into her local pharmacy today. NN gave patient NN call back number of [phone] and asked patient to call NN back if any issues persist with the patients medication. Patient verbalized understanding and read back NN number to ensure accuracy.     @ 11:00 am    NN called the patient's pharmacy and called in the two prescriptions of Nifedipine.

## 2024-11-18 NOTE — TELEPHONE ENCOUNTER
Patient requesting refill on acetaminophen-codeine 300-60mg (TYLENOL #4) 300-60 mg Tab   Last office visit 11/05/24   shows last refill on 10/21/24  Patient does not have a pain contract on file with [facility]  Patient last UDS 07/30/2024 was consistent with current therapy                       CODEINE   Present     Not Detected       Comment: INTERPRETIVE INFORMATION: Codeine, U  Positive Cutoff: 40 ng/mL  Methodology: Mass Spectrometry   MORPHINE   Present     Not Detected       Comment: INTERPRETIVE INFORMATION:Morphine, U  Positive Cutoff: 20 ng/mL  Methodology: Mass Spectrometry   6-ACETYLMORPHINE   Not Detected     Not Detected       Comment: INTERPRETIVE INFORMATION:6-acetylmorphine, U  Positive Cutoff: 20 ng/mL  Methodology: Mass Spectrometry   OXYCODONE   Not Detected     Not Detected       Comment: INTERPRETIVE INFORMATION:Oxycodone, U  Positive Cutoff: 40 ng/mL  Methodology: Mass Spectrometry   NOROYXCODONE   Not Detected     Not Detected       Comment: INTERPRETIVE INFORMATION:Noroxycodone, U  Positive Cutoff: 100 ng/mL  Methodology: Mass Spectrometry   OXYMORPHONE   Not Detected     Not Detected       Comment: INTERPRETIVE INFORMATION:Oxymorphone, U  Positive Cutoff: 40 ng/mL  Methodology: Mass Spectrometry   NOROXYMORPHONE   Not Detected     Not Detected       Comment: INTERPRETIVE INFORMATION:Noroxymorphone, U  Positive Cutoff: 100 ng/mL  Methodology: Mass Spectrometry   HYDROCODONE   Not Detected     Not Detected       Comment: INTERPRETIVE INFORMATION:Hydrocodone, U  Positive Cutoff: 40 ng/mL  Methodology: Mass Spectrometry   NORHYDROCODONE   Not Detected     Not Detected       Comment: INTERPRETIVE INFORMATION:Norhydrocodone, U  Positive Cutoff: 100 ng/mL  Methodology: Mass Spectrometry   HYDROMORPHONE   Not Detected     Not Detected       Comment: INTERPRETIVE INFORMATION:Hydromorphone, U  Positive Cutoff: 20 ng/mL  Methodology: Mass Spectrometry    BUPRENORPHINE   Not Detected     Not Detected       Comment: INTERPRETIVE INFORMATION:Buprenorphine, U  Positive Cutoff: 5 ng/mL  Methodology: Mass Spectrometry   NORUBPRENORPHINE   Not Detected     Not Detected       Comment: INTERPRETIVE INFORMATION:Norbuprenorphine, U  Positive Cutoff: 20 ng/mL  Methodology: Mass Spectrometry   FENTANYL   Not Detected     Not Detected       Comment: INTERPRETIVE INFORMATION:Fentanyl, U  Positive Cutoff: 2 ng/mL  Methodology: Mass Spectrometry   NORFENTANYL   Not Detected     Not Detected       Comment: INTERPRETIVE INFORMATION:Norfentanyl, U  Positive Cutoff: 2 ng/mL  Methodology: Mass Spectrometry   MEPERIDINE METABOLITE   Not Detected     Not Detected       Comment: INTERPRETIVE INFORMATION:Meperidine metabolite, U  Positive Cutoff: 50 ng/mL  Methodology: Mass Spectrometry   TAPENTADOL   Not Detected     Not Detected       Comment: INTERPRETIVE INFORMATION:Tapentadol, U  Positive Cutoff: 100 ng/mL  Methodology: Mass Spectrometry   TAPENTADOL-O-SULF   Not Detected     Not Detected       Comment: INTERPRETIVE INFORMATION:Tapentadol-o-Sulf, U  Positive Cutoff: 200 ng/mL  Methodology: Mass Spectrometry   METHADONE   Negative     Not Detected       Comment: Presumptive negative by immunoassay. Testing by mass  spectrometry is available on request.  INTERPRETIVE INFORMATION: Methadone Screen, U  Positive Cutoff: 150 ng/mL  Methodology: Immunoassay   TRAMADOL   Negative     Not Detected       Comment: Presumptive negative by immunoassay. Testing by mass  spectrometry is available on request.  INTERPRETIVE INFORMATION:Tramadol Screen, U  Positive Cutoff: 100 ng/mL  Methodology: Immunoassay   AMPHETAMINE   Not Detected     Not Detected       Comment: INTERPRETIVE INFORMATION:Amphetamine, U  Positive Cutoff: 50 ng/mL  Methodology: Mass Spectrometry   METHAMPHETAMINE   Not Detected     Not Detected       Comment: INTERPRETIVE INFORMATION:Methamphetamine, U  Positive Cutoff: 200  ng/mL  Methodology: Mass Spectrometry   MDMA- ECSTASY   Not Detected     Not Detected       Comment: INTERPRETIVE INFORMATION:MDMA, U  Positive Cutoff: 200 ng/mL  Methodology: Mass Spectrometry   MDA   Not Detected     Not Detected       Comment: INTERPRETIVE INFORMATION:MDA, U  Positive Cutoff: 200 ng/mL  Methodology: Mass Spectrometry   MDEA- Mickie   Not Detected     Not Detected       Comment: INTERPRETIVE INFORMATION:MDEA, U  Positive Cutoff: 200 ng/mL  Methodology: Mass Spectrometry   METHYLPHENIDATE   Not Detected     Not Detected       Comment: INTERPRETIVE INFORMATION:Methylphenidate, U  Positive Cutoff: 100 ng/mL  Methodology: Mass Spectrometry   PHENTERMINE   Not Detected     Not Detected       Comment: INTERPRETIVE INFORMATION:Phentermine, U  Positive Cutoff: 100 ng/mL  Methodology: Mass Spectrometry   BENZOYLECGONINE   Negative     Not Detected       Comment: Presumptive negative by immunoassay. Testing by mass  spectrometry is available on request.  INTERPRETIVE INFORMATION:Cocaine Screen, U  Positive Cutoff: 150 ng/mL  Methodology: Immunoassay   ALPRAZOLAM   Not Detected     Not Detected       Comment: INTERPRETIVE INFORMATION:Alprazolam, U  Positive Cutoff: 40 ng/mL  Methodology: Mass Spectrometry   ALPHA-OH-ALPRAZOLAM   Not Detected     Not Detected       Comment: INTERPRETIVE INFORMATION:Alpha-OH-Alprazolam, U  Positive Cutoff: 20 ng/mL  Methodology: Mass Spectrometry   CLONAZEPAM   Not Detected     Not Detected       Comment: INTERPRETIVE INFORMATION:Clonazepam, U  Positive Cutoff: 20 ng/mL  Methodology: Mass Spectrometry   7-AMINOCLONAZEPAM   Not Detected     Not Detected       Comment: INTERPRETIVE INFORMATION:7-Aminoclonazepam, U  Positive Cutoff: 40 ng/mL  Methodology: Mass Spectrometry   DIAZEPAM   Not Detected     Not Detected       Comment: INTERPRETIVE INFORMATION:Diazepam, U  Positive Cutoff: 50 ng/mL  Methodology: Mass Spectrometry   NORDIAZEPAM   Not Detected     Not Detected        Comment: INTERPRETIVE INFORMATION:Nordiazepam, U  Positive Cutoff: 50 ng/mL  Methodology: Mass Spectrometry   OXAZEPAM   Not Detected     Not Detected       Comment: INTERPRETIVE INFORMATION:Oxazepam, U  Positive Cutoff: 50 ng/mL  Methodology: Mass Spectrometry   TEMAZEPAM   Not Detected     Not Detected       Comment: INTERPRETIVE INFORMATION:Temazepam, U  Positive Cutoff: 50 ng/mL  Methodology: Mass Spectrometry   Lorazepam   Not Detected     Not Detected       Comment: INTERPRETIVE INFORMATION:Lorazepam, U  Positive Cutoff: 60 ng/mL  Methodology: Mass Spectrometry   MIDAZOLAM   Not Detected     Not Detected       Comment: INTERPRETIVE INFORMATION:Midazolam, U  Positive Cutoff: 20 ng/mL  Methodology: Mass Spectrometry   ZOLPIDEM   Not Detected     Not Detected       Comment: INTERPRETIVE INFORMATION:Zolpidem, U  Positive Cutoff: 20 ng/mL  Methodology: Mass Spectrometry   BARBITURATES   Negative     Not Detected       Comment: Presumptive negative by immunoassay. Testing by mass  spectrometry is available on request.  INTERPRETIVE INFORMATION:Barbiturates Screen, U  Positive Cutoff: 200 ng/mL  Methodology: Immunoassay   Creatinine, Urine 20.0 - 400.0 mg/dL 112.0 47.0 R 103.0 R 344.5 139.0 R,  R   ETHYL GLUCURONIDE   Negative     Present       Comment: Presumptive negative by immunoassay. Testing by mass  spectrometry is available on request.  INTERPRETIVE INFORMATION:Ethyl Glucuronide Screen, U  Positive Cutoff: 500 ng/mL  Methodology: Immunoassay   MARIJUANA METABOLITE   Negative     Not Detected       Comment: Presumptive negative by immunoassay. Testing by mass  spectrometry is available on request.  INTERPRETIVE INFORMATION: THC (Cannabinoids) Screen, U  Positive Cutoff: 50 ng/mL  Methodology: Immunoassay   PCP   Negative     Not Detected       Comment: Presumptive negative by immunoassay. Testing by mass  spectrometry is available on request.  INTERPRETIVE INFORMATION:Phencyclidine Screen, U  Positive  Cutoff: 25 ng/mL  Methodology: Immunoassay   CARISOPRODOL   Negative     Not Detected CM       Comment: Presumptive negative by immunoassay. Testing by mass  spectrometry is available on request.  INTERPRETIVE INFORMATION: Carisoprodol Screen, U  Positive Cutoff: 100 ng/mL  Methodology: Immunoassay  The carisoprodol immunoassay has cross-reactivity to  carisoprodol and meprobamate.   Naloxone   Not Detected     Not Detected       Comment: INTERPRETIVE INFORMATION:Naloxone, U  Positive Cutoff: 100 ng/mL  Methodology: Mass Spectrometry   Gabapentin   Not Detected     Not Detected       Comment: INTERPRETIVE INFORMATION:Gabapentin, U  Positive Cutoff: 3,000 ng/mL  Methodology: Mass Spectrometry   Pregabalin   Present     Present       Comment: INTERPRETIVE INFORMATION:Pregabalin, U  Positive Cutoff: 3,000 ng/mL  Methodology: Mass Spectrometry   Alpha-OH-Midazolam   Not Detected     Not Detected       Comment: INTERPRETIVE INFORMATION:Alpha-OH-Midazolam, U  Positive Cutoff: 20 ng/mL  Methodology: Mass Spectrometry   Zolpidem Metabolite   Not Detected     Not Detected

## 2024-11-19 DIAGNOSIS — J84.9 INTERSTITIAL PULMONARY DISEASE, UNSPECIFIED: Primary | ICD-10-CM

## 2024-11-19 DIAGNOSIS — R91.1 PULMONARY NODULE: ICD-10-CM

## 2024-11-19 DIAGNOSIS — M34.9 SCLERODERMA WITH PULMONARY INVOLVEMENT: ICD-10-CM

## 2024-11-19 NOTE — PROGRESS NOTES
Per provider note 11/12/24 Dr. Leigh, patient RTC 3 months with PFTs, CT chest due to pulmonary nodule. Request to .

## 2024-11-21 ENCOUNTER — OFFICE VISIT (OUTPATIENT)
Dept: PODIATRY | Facility: CLINIC | Age: 74
End: 2024-11-21
Payer: MEDICARE

## 2024-11-21 VITALS
SYSTOLIC BLOOD PRESSURE: 126 MMHG | BODY MASS INDEX: 23.66 KG/M2 | DIASTOLIC BLOOD PRESSURE: 64 MMHG | WEIGHT: 142 LBS | HEIGHT: 65 IN | HEART RATE: 75 BPM

## 2024-11-21 DIAGNOSIS — L03.119 CELLULITIS OF LOWER EXTREMITY, UNSPECIFIED LATERALITY: Primary | ICD-10-CM

## 2024-11-21 DIAGNOSIS — L97.922 ULCERS OF BOTH LOWER EXTREMITIES WITH FAT LAYER EXPOSED: ICD-10-CM

## 2024-11-21 DIAGNOSIS — M34.9 SCLERODERMA: ICD-10-CM

## 2024-11-21 DIAGNOSIS — L97.912 ULCERS OF BOTH LOWER EXTREMITIES WITH FAT LAYER EXPOSED: ICD-10-CM

## 2024-11-21 PROCEDURE — 99999 PR PBB SHADOW E&M-EST. PATIENT-LVL III: CPT | Mod: PBBFAC,,, | Performed by: STUDENT IN AN ORGANIZED HEALTH CARE EDUCATION/TRAINING PROGRAM

## 2024-11-21 PROCEDURE — 99213 OFFICE O/P EST LOW 20 MIN: CPT | Mod: PBBFAC | Performed by: STUDENT IN AN ORGANIZED HEALTH CARE EDUCATION/TRAINING PROGRAM

## 2024-11-21 PROCEDURE — 99214 OFFICE O/P EST MOD 30 MIN: CPT | Mod: S$PBB,,, | Performed by: STUDENT IN AN ORGANIZED HEALTH CARE EDUCATION/TRAINING PROGRAM

## 2024-11-21 RX ORDER — LEVOFLOXACIN 25 MG/ML
500 SOLUTION ORAL DAILY
Qty: 200 ML | Refills: 0 | Status: SHIPPED | OUTPATIENT
Start: 2024-11-21

## 2024-11-21 RX ORDER — AMOXICILLIN AND CLAVULANATE POTASSIUM 250; 62.5 MG/5ML; MG/5ML
250 POWDER, FOR SUSPENSION ORAL DAILY
Qty: 150 ML | Refills: 0 | Status: SHIPPED | OUTPATIENT
Start: 2024-11-21

## 2024-11-22 ENCOUNTER — HOSPITAL ENCOUNTER (OUTPATIENT)
Facility: HOSPITAL | Age: 74
Discharge: HOME | End: 2024-11-22
Attending: INTERNAL MEDICINE | Admitting: INTERNAL MEDICINE
Payer: MEDICARE

## 2024-11-22 VITALS
HEART RATE: 62 BPM | TEMPERATURE: 98 F | DIASTOLIC BLOOD PRESSURE: 60 MMHG | RESPIRATION RATE: 18 BRPM | SYSTOLIC BLOOD PRESSURE: 126 MMHG | WEIGHT: 137.38 LBS | OXYGEN SATURATION: 98 % | BODY MASS INDEX: 22.89 KG/M2 | HEIGHT: 65 IN

## 2024-11-22 DIAGNOSIS — I27.21 WHO GROUP 1 PULMONARY ARTERIAL HYPERTENSION: ICD-10-CM

## 2024-11-22 DIAGNOSIS — I27.9 CHRONIC PULMONARY HEART DISEASE: ICD-10-CM

## 2024-11-22 DIAGNOSIS — Z01.812 PRE-PROCEDURE LAB EXAM: Primary | ICD-10-CM

## 2024-11-22 DIAGNOSIS — I27.20 PULMONARY HYPERTENSION: ICD-10-CM

## 2024-11-22 LAB
ANION GAP SERPL CALC-SCNC: 8 MMOL/L (ref 8–16)
BASOPHILS # BLD AUTO: 0.02 K/UL (ref 0–0.2)
BASOPHILS NFR BLD: 0.5 % (ref 0–1.9)
BUN SERPL-MCNC: 18 MG/DL (ref 8–23)
CALCIUM SERPL-MCNC: 9 MG/DL (ref 8.7–10.5)
CHLORIDE SERPL-SCNC: 105 MMOL/L (ref 95–110)
CO2 SERPL-SCNC: 26 MMOL/L (ref 23–29)
CREAT SERPL-MCNC: 0.7 MG/DL (ref 0.5–1.4)
DIFFERENTIAL METHOD BLD: ABNORMAL
EOSINOPHIL # BLD AUTO: 0 K/UL (ref 0–0.5)
EOSINOPHIL NFR BLD: 0.8 % (ref 0–8)
ERYTHROCYTE [DISTWIDTH] IN BLOOD BY AUTOMATED COUNT: 13.4 % (ref 11.5–14.5)
EST. GFR  (NO RACE VARIABLE): >60 ML/MIN/1.73 M^2
GLUCOSE SERPL-MCNC: 93 MG/DL (ref 70–110)
HCT VFR BLD AUTO: 34.2 % (ref 37–48.5)
HGB BLD-MCNC: 11.2 G/DL (ref 12–16)
IMM GRANULOCYTES # BLD AUTO: 0.01 K/UL (ref 0–0.04)
IMM GRANULOCYTES NFR BLD AUTO: 0.3 % (ref 0–0.5)
LYMPHOCYTES # BLD AUTO: 1.3 K/UL (ref 1–4.8)
LYMPHOCYTES NFR BLD: 33.9 % (ref 18–48)
MCH RBC QN AUTO: 32.1 PG (ref 27–31)
MCHC RBC AUTO-ENTMCNC: 32.7 G/DL (ref 32–36)
MCV RBC AUTO: 98 FL (ref 82–98)
MONOCYTES # BLD AUTO: 0.4 K/UL (ref 0.3–1)
MONOCYTES NFR BLD: 8.9 % (ref 4–15)
NEUTROPHILS # BLD AUTO: 2.2 K/UL (ref 1.8–7.7)
NEUTROPHILS NFR BLD: 55.6 % (ref 38–73)
NRBC BLD-RTO: 0 /100 WBC
PLATELET # BLD AUTO: 192 K/UL (ref 150–450)
PMV BLD AUTO: 11 FL (ref 9.2–12.9)
POTASSIUM SERPL-SCNC: 4.2 MMOL/L (ref 3.5–5.1)
RBC # BLD AUTO: 3.49 M/UL (ref 4–5.4)
SODIUM SERPL-SCNC: 139 MMOL/L (ref 136–145)
WBC # BLD AUTO: 3.92 K/UL (ref 3.9–12.7)

## 2024-11-22 PROCEDURE — C1894 INTRO/SHEATH, NON-LASER: HCPCS | Mod: TXP | Performed by: INTERNAL MEDICINE

## 2024-11-22 PROCEDURE — C1751 CATH, INF, PER/CENT/MIDLINE: HCPCS | Mod: TXP | Performed by: INTERNAL MEDICINE

## 2024-11-22 PROCEDURE — 85025 COMPLETE CBC W/AUTO DIFF WBC: CPT | Mod: TXP | Performed by: INTERNAL MEDICINE

## 2024-11-22 PROCEDURE — 63600175 PHARM REV CODE 636 W HCPCS: Mod: TXP | Performed by: INTERNAL MEDICINE

## 2024-11-22 PROCEDURE — 63600175 PHARM REV CODE 636 W HCPCS: Mod: NTX | Performed by: INTERNAL MEDICINE

## 2024-11-22 PROCEDURE — 80048 BASIC METABOLIC PNL TOTAL CA: CPT | Mod: TXP | Performed by: INTERNAL MEDICINE

## 2024-11-22 PROCEDURE — 93451 RIGHT HEART CATH: CPT | Mod: NTX | Performed by: INTERNAL MEDICINE

## 2024-11-22 RX ORDER — LIDOCAINE HYDROCHLORIDE 20 MG/ML
INJECTION, SOLUTION EPIDURAL; INFILTRATION; INTRACAUDAL; PERINEURAL
Status: DISCONTINUED | OUTPATIENT
Start: 2024-11-22 | End: 2024-11-22 | Stop reason: HOSPADM

## 2024-11-22 RX ORDER — ACETAMINOPHEN AND CODEINE PHOSPHATE 300; 60 MG/1; MG/1
1 TABLET ORAL NIGHTLY
Qty: 30 TABLET | Refills: 0 | Status: SHIPPED | OUTPATIENT
Start: 2024-11-24 | End: 2024-12-24

## 2024-11-22 RX ORDER — NIFEDIPINE 30 MG/1
30 TABLET, EXTENDED RELEASE ORAL DAILY
Qty: 30 TABLET | Refills: 11 | Status: SHIPPED | OUTPATIENT
Start: 2024-11-22 | End: 2025-11-22

## 2024-11-22 NOTE — DISCHARGE INSTRUCTIONS
Take total of 120 mg nifedipine- I refilled the 90 and the 30.     AFTER THE PROCEDURE:  -You may remove the bandage in 24 hours and wash with soap and water.  -You may shower, but do not soak in a tub for three days.   PRECAUTIONS FOR THE NEXT 24 HOURS:  -If you need to cough, sneeze, have a bowel movement, or bear down, hold pressure over your bandage.  -Do not  anything heavier than a gallon of milk(about 5 pounds)  -Avoid excessive bending over.  SYMPTOMS TO WATCH FOR AND REPORT TO YOUR DOCTOR:  -BLEEDING: hold pressure over the site until bleeding stops. Proceed to Emergency Room by ambulance (do not drive yourself) if unable to stop bleeding. Notify your doctor.  -HEMATOMA(hard bruise under the skin): Talib around the bruise if one develops. Call your doctor if it increases in size or if you have difficulty talking, swallowing, breathing or anything unusual.  SIGNS OF INFECTION:Fever (temperature over 100.5 F), pus or redness  -RASH  -CHEST PAIN OR SHORTNESS OF BREATH    You may call you coordinator in the Heart Failure/Heart Transplant/Pulmonary Hypertension Clinic at [phone] during normal business hours(Monday through Friday from 8 A.M. to 5 P.M.) After hours, call the Heart Transplant Service doctor on call at [phone].

## 2024-11-22 NOTE — DISCHARGE SUMMARY
[facility] Lab  Discharge Note  Short Stay    Procedure(s) (LRB):  INSERTION, CATHETER, RIGHT HEART (Right)      OUTCOME: Patient tolerated treatment/procedure well without complication and is now ready for discharge.    DISPOSITION: Home or Self Care    FINAL DIAGNOSIS:  <principal problem not specified>    FOLLOWUP: In clinic    DISCHARGE INSTRUCTIONS:  No discharge procedures on file.     TIME SPENT ON DISCHARGE: 25 minutes

## 2024-11-22 NOTE — PROGRESS NOTES
Pt DC'd per MD order. Discharge instructions given including activity, wound care, S&S of infections, future appointments, and when to call MD. Medications reviewed including when to take next dose. Patient declined transport and ambulated off unit with spouse.

## 2024-11-22 NOTE — Clinical Note
A percutaneous stick to the right internal jugular vein was performed. Ultrasound guidance was used to obtain access. Patient called for update. Advised Dr. Day has not yet reviewed and a care team member will call after he has reviewed.    Elvie Fisher RN  [facility]

## 2024-11-22 NOTE — PLAN OF CARE
Patient arrived to room. PIV placed. Admit assessment completed. Plan of care discussed with patient.    Mother/Patient

## 2024-11-22 NOTE — PROGRESS NOTES
Subjective:     Patient    Yamel Shah is a 74 y.o. female.    Problem    09/25/24: Seen by Dr Morton in 2021 for chronic bilateral foot wounds. Has been caring for them herself for the past few years because she felt she never made much progress with podiatry or wound care. Now presents with chronic bilateral foot wounds, burning, stinging which can sometimes keep her up at night. Previously tried amitriptyline but it cause headaches and nightmares.      10/04/24: Returns for chronic bilateral foot wounds, no new issues. On antibiotics.     10/11/24: Returns for chronic bilateral foot wounds. Had improved pain with duloxetine. Prescribed doxycycline + Levofloxacin but only taking the Levofloxacin.     10/24/24: Returns for chronic bilateral foot wounds. On Levofloxacin + Augmentin. Leaving town next week.     11/05/24: Returns for chronic bilateral foot wounds. Pain definitely improved with duloxetine but still present, no side effects. Still significant drainage both feet. Out of antibiotics, no side effects.     11/21/24: Returns for chronic bilateral foot wounds. Only started the new dose of duloxetine a couple days ago, having mild headache but continuing. Needs more antibiotics.     History    History obtained from patient and review of medical records.     Past Medical History:   Diagnosis Date    Abnormal Pap smear     Acid reflux     Allergy     Arthritis     Encounter for blood transfusion     GERD (gastroesophageal reflux disease)     Hiatal hernia 03/24/2023    History of migraine headaches     Hx of colonic polyp     Hypertension     Idiopathic neuropathy 07/20/2012    ILD (interstitial lung disease) 11/06/2013    Iron deficiency anemia 03/18/2014    MRSA carrier     Osteopenia     Pneumonia     Pulmonary fibrosis     Pulmonary hypertension     Raynaud's disease     Scleroderma, diffuse     Sjogren's syndrome     Vitamin D deficiency 11/14/2013       Past Surgical History:   Procedure  Laterality Date    24 HOUR IMPEDANCE PH MONITORING OF ESOPHAGUS IN PATIENT NOT TAKING ACID REDUCING MEDICATIONS N/A 03/04/2020    Procedure: IMPEDANCE PH STUDY, ESOPHAGEAL, 24 HOUR, IN PATIENT NOT TAKING ACID REDUCING MEDICATION;  Surgeon: Annamaria Mendoza MD;  Location: [facility] (4TH FLR);  Service: Endoscopy;  Laterality: N/A;  OFF PPI/H2 Blocker   Motility Studies   Hold Narcotics x 1 days   Hold TCA x 1 days  2/26 - LVM attempting to confirm appt  2/27 - Confirmed appt    ANORECTAL MANOMETRY N/A 05/10/2021    Procedure: MANOMETRY, ANORECTAL with balloon expulsion test;  Surgeon: Annamaria Mendoza MD;  Location: [facility] (4TH FLR);  Service: Endoscopy;  Laterality: N/A;  order combined  covid test 5/7 Y[employer]danism, instructions emailed-[facility]    BREAST BIOPSY      Left, benign    BRONCHOSCOPY N/A 10/2/2023    Procedure: bronch;  Surgeon: Roberta Leigh DO;  [facility]  Service: Endoscopy;  Laterality: N/A;    BRONCHOSCOPY N/A 9/16/2024    Procedure: Flexible bronchoscopy (BAL only);  Surgeon: Roberta Leigh DO;  Location: [facility] OR 2ND FLR;  Service: Endoscopy;  Laterality: N/A;    CERVICAL CONIZATION   W/ LASER  1970    COLONOSCOPY      COLONOSCOPY N/A 03/29/2019    Procedure: COLONOSCOPY;  Surgeon: Annamaria Mendoza MD;  Location: [facility] (2ND FLR);  Service: Endoscopy;  Laterality: N/A;    COLONOSCOPY N/A 05/10/2021    Procedure: COLONOSCOPY;  Surgeon: Annamaria Mendoza MD;  Location: [facility] (4TH FLR);  Service: Endoscopy;  Laterality: N/A;  2nd floor-previous scopes done on 2nd floor, gastroparesis  full liquid diet x2 days, clear liquid x1 day prior to procedure  covid test 5/7 Yazdanism, instructions emailed-[facility]  5/6 pt confirmed appt-[facility]    DILATION AND CURETTAGE OF UTERUS      ESOPHAGEAL MANOMETRY WITH MEASUREMENT OF IMPEDANCE N/A 03/04/2020    Procedure: MANOMETRY, ESOPHAGUS, WITH IMPEDANCE MEASUREMENT;  Surgeon: Annamaria Mendoza MD;  Location: [facility] (4TH FLR);  Service:  Endoscopy;  Laterality: N/A;  OFF PPI/H2 Blocker   Motility Studies   Hold Narcotics x 1 days   Hold TCA x 1 days    ESOPHAGOGASTRODUODENOSCOPY      ESOPHAGOGASTRODUODENOSCOPY N/A 03/29/2019    Procedure: EGD (ESOPHAGOGASTRODUODENOSCOPY);  Surgeon: Annamaria Mendoza MD;  Location: [facility] ENDO (2ND FLR);  Service: Endoscopy;  Laterality: N/A;  pulmonary htn    ESOPHAGOGASTRODUODENOSCOPY N/A 02/02/2021    Procedure: ESOPHAGOGASTRODUODENOSCOPY (EGD);  Surgeon: Annamaria Mendoza MD;  Location: [facility] ENDO (2ND FLR);  Service: Endoscopy;  Laterality: N/A;  2nd floor gastroparesis  3 days full liquid diet and 1 day clears  covid test 1/30 primary care, instructions sent to [facility]    ESOPHAGOGASTRODUODENOSCOPY N/A 07/01/2022    Procedure: ESOPHAGOGASTRODUODENOSCOPY (EGD);  Surgeon: Annamaria Mendoza MD;  Location: [facility] (2ND FLR);  Service: Endoscopy;  Laterality: N/A;  2nd floor-gastroparesis  full liquid diet x3 days, clear liquid diet x1 day prior to procedure  fully vaccinated, instructions sent to [facility]  6/29-pt confirmed new arrival time\[facility] [place] [facility]\""    EXCISION, LESION, STOMACH, LAPAROSCOPIC N/A 03/23/2023    Procedure: XI ROBOTIC EXCISION, LESION, STOMACH;  Surgeon: Katherine Segura MD;  Location: [facility] OR [address] [place] [address];  Service: General;  Laterality: N/A;    EXCISIONAL BIOPSY, LYMPH NODE Right 05/26/2023    Procedure: EXCISIONAL BIOPSY, LYMPH NODE, INGUINAL;  Surgeon: Katherine Segura MD;  [facility];  Service: General;  Laterality: Right;    HYSTERECTOMY  1990    FRANDY (AUB, Fibroids), ovaries remain    REPAIR, HERNIA, UMBILICAL N/A 03/23/2023    Procedure: REPAIR, HERNIA, UMBILICAL;  Surgeon: Katherine Segura MD;  [facility];  Service: General;  Laterality: N/A;    RIGHT HEART CATHETERIZATION Right 01/05/2021    Procedure: INSERTION, CATHETER, RIGHT HEART;  Surgeon: Aureliano Sy MD;  Location: [facility] CATH LAB;  Service: Cardiology;  Laterality: Right;     RIGHT HEART CATHETERIZATION Right 2023    Procedure: INSERTION, CATHETER, RIGHT HEART;  Surgeon: Aureliano Sy MD;  Location: [facility] CATH LAB;  Service: Cardiology;  Laterality: Right;    ROBOT-ASSISTED LAPAROSCOPIC REPAIR OF INGUINAL HERNIA USING DA VERNELL XI Right 2023    Procedure: XI ROBOTIC REPAIR, HERNIA, INGUINAL, FEMORAL;  Surgeon: Katherine Segura MD;  Location: [facility] OR [facility] FLR;  Service: General;  Laterality: Right;    ROBOT-ASSISTED REPAIR OF HIATAL HERNIA USING DA VERNELL XI N/A 2023    Procedure: XI ROBOTIC REPAIR, HERNIA, HIATAL;  Surgeon: Katherine Segura MD;  [facility];  Service: General;  Laterality: N/A;    VARICOSE VEIN SURGERY      XI ROBOTIC GASTROPEXY N/A 2023    Procedure: XI ROBOTIC GASTROPEXY;  Surgeon: Katherine Segura MD;  [facility];  Service: General;  Laterality: N/A;    XI ROBOTIC PYLOROMYOTOMY N/A 2023    Procedure: XI ROBOTIC PYLOROMYOTOMY;  Surgeon: Katherine eSgura MD;  [facility];  Service: General;  Laterality: N/A;        Objective:     Vitals  Wt Readings from Last 1 Encounters:   24 64.4 kg (141 lb 15.6 oz)     Temp Readings from Last 1 Encounters:   24 98.6 °F (37 °C) (Oral)     BP Readings from Last 1 Encounters:   24 126/64     Pulse Readings from Last 1 Encounters:   24 75       Dermatological Exam    Skin:  Skin of both feet thickened (scleroderma), stiff, with extensive ulcerations of both feet down to fat with slowly improving granulation      Nails:  10 nail(s) elongated, 10 nail(s) thickened, and 10 nail(s) discolored    Vascular Exam    Arteries:  Posterior tibial artery palpable on right  Dorsalis pedis artery palpable on right  Posterior tibial artery palpable on left  Dorsalis pedis artery palpable on left    Veins:  Superficial veins unremarkable on right  Superficial veins unremarkable on left    Swellin+ nonpitting  on right  1+ nonpitting on left    Neurological Exam    [place] touch test:  6/6 sites sensed, light touch intact     Musculoskeletal Exam    Footwear:  Casual on right  Casual on left    Gait Exam:   Ambulatory Status: Ambulatory  Gait: Normal  Assistive Devices: None    Foot Progression Angle:  Normal on right  Normal on left    Right Lower Extremity Additional Findings:  Right foot and ankle function, strength, and range of motion unremarkable except as noted above.     Left Lower Extremity Additional Findings:  Left foot and ankle function, strength, and range of motion unremarkable except as noted above.    Imaging and Other Tests    Imaging:  Independently reviewed and interpreted imaging, findings are as follows: N/A     Assessment:     Encounter Diagnoses   Name Primary?    Cellulitis of lower extremity, unspecified laterality Yes    Ulcers of both lower extremities with fat layer exposed     Scleroderma           Plan:     I counseled the patient on her conditions, their implications and medical management.    Neuropathy: chronic stable  -Continue duloxetine 60 mg/day.     Bilateral ulcers, cellulitis, swelling: chronic stable  -Continue Levofloxacin + Augmentin.   -Recommended debridement, patient will consider in future.   -Dressings changed.    -Protected WB in surgical shoes.   -Consider topical vitamin E in future.     Scleroderma: chronic stable  -Complicating wound management, consider vitamin E in future.     Return to clinic in 1-2 weeks, call sooner PRN.

## 2024-11-22 NOTE — PLAN OF CARE
Received report from Juan. Patient s/p [facility], AAOx3. VSS, no c/o pain or discomfort at this time, resp even and unlabored. Vaseline gauze/tegaderm dressing to R neck is CDI. No active bleeding. No hematoma noted. Post procedure protocol reviewed with patient and patient's family. Understanding verbalized. Family members at bedside. Nurse call bell within reach.

## 2024-11-22 NOTE — Clinical Note
The PA catheter was repositioned to the main pulmonary artery. Hemodynamics were performed. O2 saturation was measured at 72%. AO: 97  CO: 4.92  CI: 2.92

## 2024-11-22 NOTE — BRIEF OP NOTE
Patient tolerated the procedure well. Please see complete [facility] procedure note for full details and results. Stable to return from cath lab to their hospital room.  Procedure: Right heart catheterization   Procedure date: 11/22/24    Discharge date: 11/22/24  Estimated blood loss: less than 10 cc  Complications: none  Condition at discharge: stable  Discharge instructions: no heavy lifting greater than 5 lbs and no bearing down/coughing without holding pressure over incision site.

## 2024-11-24 DIAGNOSIS — E55.9 VITAMIN D DEFICIENCY: ICD-10-CM

## 2024-11-24 NOTE — TELEPHONE ENCOUNTER
Care Due:                  Date            Visit Type   Department     Provider  --------------------------------------------------------------------------------                                EP -                              PRIMARY      MET INTERNAL  Last Visit: 10-      CARE ([facility])   MEDICINE       Alypauline Rand                              EP -                              PRIMARY      [facility] INTERNAL  Next Visit: 04-      CARE ([facility])   MEDICINE       Aly A  Giorgi                                                            Last  Test          Frequency    Reason                     Performed    Due Date  --------------------------------------------------------------------------------    Vitamin D...  12 months..  ergocalciferol...........  10-   10-    [employer] Embedded Care Due Messages. Reference number: [id number].   11/24/2024 1:47:23 PM CST

## 2024-11-25 ENCOUNTER — PATIENT MESSAGE (OUTPATIENT)
Dept: TRANSPLANT | Facility: CLINIC | Age: 74
End: 2024-11-25
Payer: MEDICARE

## 2024-11-25 LAB
GLUCOSE SERPL-MCNC: 95 MG/DL (ref 70–110)
HCO3 UR-SCNC: 30.1 MMOL/L (ref 24–28)
HCT VFR BLD CALC: 34 %PCV (ref 36–54)
PCO2 BLDA: 48.8 MMHG (ref 35–45)
PH SMN: 7.4 [PH] (ref 7.35–7.45)
PO2 BLDA: 38 MMHG (ref 40–60)
POC BE: 5 MMOL/L
POC IONIZED CALCIUM: 1.22 MMOL/L (ref 1.06–1.42)
POC SATURATED O2: 71 % (ref 95–100)
POC TCO2: 32 MMOL/L (ref 24–29)
POTASSIUM BLD-SCNC: 4.2 MMOL/L (ref 3.5–5.1)
SAMPLE: ABNORMAL
SODIUM BLD-SCNC: 140 MMOL/L (ref 136–145)

## 2024-11-25 RX ORDER — ERGOCALCIFEROL 1.25 MG/1
50000 CAPSULE ORAL
Qty: 12 CAPSULE | Refills: 1 | Status: SHIPPED | OUTPATIENT
Start: 2024-11-25

## 2024-11-25 RX ORDER — ACETAMINOPHEN AND CODEINE PHOSPHATE 300; 60 MG/1; MG/1
1 TABLET ORAL NIGHTLY
Qty: 30 TABLET | Refills: 0 | Status: SHIPPED | OUTPATIENT
Start: 2024-11-25 | End: 2024-12-25

## 2024-11-25 NOTE — TELEPHONE ENCOUNTER
Refill Routing Note   Medication(s) are not appropriate for processing by [facility] for the following reason(s):        Outside of protocol    [facility] action(s):  Route     Requires labs : Yes             Appointments  past 12m or future 3m with PCP    Date Provider   Last Visit   10/21/2024 Aly Rand MD   Next Visit   4/28/2025 Aly Rand MD   ED visits in past 90 days: 0        Note composed:8:52 AM 11/25/2024

## 2024-11-29 ENCOUNTER — OFFICE VISIT (OUTPATIENT)
Dept: PODIATRY | Facility: CLINIC | Age: 74
End: 2024-11-29
Payer: MEDICARE

## 2024-11-29 VITALS
SYSTOLIC BLOOD PRESSURE: 112 MMHG | HEART RATE: 70 BPM | HEIGHT: 65 IN | DIASTOLIC BLOOD PRESSURE: 54 MMHG | BODY MASS INDEX: 22.86 KG/M2

## 2024-11-29 DIAGNOSIS — G60.9 IDIOPATHIC NEUROPATHY: ICD-10-CM

## 2024-11-29 DIAGNOSIS — L97.922 ULCERS OF BOTH LOWER EXTREMITIES WITH FAT LAYER EXPOSED: Primary | ICD-10-CM

## 2024-11-29 DIAGNOSIS — L97.912 ULCERS OF BOTH LOWER EXTREMITIES WITH FAT LAYER EXPOSED: Primary | ICD-10-CM

## 2024-11-29 DIAGNOSIS — M34.9 SCLERODERMA: ICD-10-CM

## 2024-11-29 PROCEDURE — 99214 OFFICE O/P EST MOD 30 MIN: CPT | Mod: PBBFAC | Performed by: STUDENT IN AN ORGANIZED HEALTH CARE EDUCATION/TRAINING PROGRAM

## 2024-11-29 PROCEDURE — 99999 PR PBB SHADOW E&M-EST. PATIENT-LVL IV: CPT | Mod: PBBFAC,,, | Performed by: STUDENT IN AN ORGANIZED HEALTH CARE EDUCATION/TRAINING PROGRAM

## 2024-11-29 RX ORDER — VITAMIN E
20 CREAM (GRAM) TOPICAL WEEKLY
Qty: 75 G | Refills: 0 | Status: SHIPPED | OUTPATIENT
Start: 2024-11-29

## 2024-11-29 RX ORDER — AMITRIPTYLINE HYDROCHLORIDE 10 MG/1
10 TABLET, FILM COATED ORAL NIGHTLY
Qty: 30 TABLET | Refills: 11 | Status: SHIPPED | OUTPATIENT
Start: 2024-11-29 | End: 2025-11-29

## 2024-11-29 NOTE — PROGRESS NOTES
Subjective:     Patient    Yamel Shah is a 74 y.o. female.    Problem    09/25/24: Seen by Dr Morton in 2021 for chronic bilateral foot wounds. Has been caring for them herself for the past few years because she felt she never made much progress with podiatry or wound care. Now presents with chronic bilateral foot wounds, burning, stinging which can sometimes keep her up at night. Previously tried amitriptyline but it cause headaches and nightmares.      10/04/24: Returns for chronic bilateral foot wounds, no new issues. On antibiotics.     10/11/24: Returns for chronic bilateral foot wounds. Had improved pain with duloxetine. Prescribed doxycycline + Levofloxacin but only taking the Levofloxacin.     10/24/24: Returns for chronic bilateral foot wounds. On Levofloxacin + Augmentin. Leaving town next week.     11/05/24: Returns for chronic bilateral foot wounds. Pain definitely improved with duloxetine but still present, no side effects. Still significant drainage both feet. Out of antibiotics, no side effects.     11/21/24: Returns for chronic bilateral foot wounds. Only started the new dose of duloxetine a couple days ago, having mild headache but continuing. Needs more antibiotics.     11/29/24: Returns for chronic bilateral foot wounds. Still having significant pain despite duloxetine and other medications.     History    History obtained from patient and review of medical records.     Past Medical History:   Diagnosis Date    Abnormal Pap smear     Acid reflux     Allergy     Arthritis     Encounter for blood transfusion     GERD (gastroesophageal reflux disease)     Hiatal hernia 03/24/2023    History of migraine headaches     Hx of colonic polyp     Hypertension     Idiopathic neuropathy 07/20/2012    ILD (interstitial lung disease) 11/06/2013    Iron deficiency anemia 03/18/2014    MRSA carrier     Osteopenia     Pneumonia     Pulmonary fibrosis     Pulmonary hypertension     Raynaud's  disease     Scleroderma, diffuse     Sjogren's syndrome     Vitamin D deficiency 11/14/2013       Past Surgical History:   Procedure Laterality Date    24 HOUR IMPEDANCE PH MONITORING OF ESOPHAGUS IN PATIENT NOT TAKING ACID REDUCING MEDICATIONS N/A 03/04/2020    Procedure: IMPEDANCE PH STUDY, ESOPHAGEAL, 24 HOUR, IN PATIENT NOT TAKING ACID REDUCING MEDICATION;  Surgeon: Annamaria Mendoza MD;  Location: [facility] (4TH FLR);  Service: Endoscopy;  Laterality: N/A;  OFF PPI/H2 Blocker   Motility Studies   Hold Narcotics x 1 days   Hold TCA x 1 days  2/26 - LVM attempting to confirm appt  2/27 - Confirmed appt    ANORECTAL MANOMETRY N/A 05/10/2021    Procedure: MANOMETRY, ANORECTAL with balloon expulsion test;  Surgeon: Annamaria Mendoza MD;  Location: [facility] (4TH FLR);  Service: Endoscopy;  Laterality: N/A;  order combined  covid test 5/7 [employer]oroastrian, instructions emailed-[facility]    BREAST BIOPSY      Left, benign    BRONCHOSCOPY N/A 10/2/2023    Procedure: bronch;  Surgeon: Roberta Leigh DO;  [facility]  Service: Endoscopy;  Laterality: N/A;    BRONCHOSCOPY N/A 9/16/2024    Procedure: Flexible bronchoscopy (BAL only);  Surgeon: Roberta Leigh DO;  Location: [facility] OR 2ND FLR;  Service: Endoscopy;  Laterality: N/A;    CERVICAL CONIZATION   W/ LASER  1970    COLONOSCOPY      COLONOSCOPY N/A 03/29/2019    Procedure: COLONOSCOPY;  Surgeon: Annamaria Mendoza MD;  Location: [facility] (2ND FLR);  Service: Endoscopy;  Laterality: N/A;    COLONOSCOPY N/A 05/10/2021    Procedure: COLONOSCOPY;  Surgeon: Annamaria Mendoza MD;  Location: [facility] ENDO (4TH FLR);  Service: Endoscopy;  Laterality: N/A;  2nd floor-previous scopes done on 2nd floor, gastroparesis  full liquid diet x2 days, clear liquid x1 day prior to procedure  covid test 5/7 Zoroastrian, instructions emailed-[facility]  5/6 pt confirmed appt-[facility]    DILATION AND CURETTAGE OF UTERUS      ESOPHAGEAL MANOMETRY WITH MEASUREMENT OF IMPEDANCE N/A 03/04/2020     Procedure: MANOMETRY, ESOPHAGUS, WITH IMPEDANCE MEASUREMENT;  Surgeon: Annamaria Mendoza MD;  Location: [facility] ENDO (4TH FLR);  Service: Endoscopy;  Laterality: N/A;  OFF PPI/H2 Blocker   Motility Studies   Hold Narcotics x 1 days   Hold TCA x 1 days    ESOPHAGOGASTRODUODENOSCOPY      ESOPHAGOGASTRODUODENOSCOPY N/A 03/29/2019    Procedure: EGD (ESOPHAGOGASTRODUODENOSCOPY);  Surgeon: Annamaria Mendoza MD;  Location: [facility] ENDO (2ND FLR);  Service: Endoscopy;  Laterality: N/A;  pulmonary htn    ESOPHAGOGASTRODUODENOSCOPY N/A 02/02/2021    Procedure: ESOPHAGOGASTRODUODENOSCOPY (EGD);  Surgeon: Annamaria Mendoza MD;  Location: [facility] ENDO (2ND FLR);  Service: Endoscopy;  Laterality: N/A;  2nd floor gastroparesis  3 days full liquid diet and 1 day clears  covid test 1/30 primary care, instructions sent to [facility]    ESOPHAGOGASTRODUODENOSCOPY N/A 07/01/2022    Procedure: ESOPHAGOGASTRODUODENOSCOPY (EGD);  Surgeon: Annamaria Mendoza MD;  Location: [facility] ENDO (2ND FLR);  Service: Endoscopy;  Laterality: N/A;  2nd floor-gastroparesis  full liquid diet x3 days, clear liquid diet x1 day prior to procedure  fully vaccinated, instructions sent to [facility]  6/29-pt confirmed new arrival time-[facility]    EXCISION, LESION, STOMACH, LAPAROSCOPIC N/A 03/23/2023    Procedure: XI ROBOTIC EXCISION, LESION, STOMACH;  Surgeon: Katherine Segura MD;  Location: [facility] OR 2ND FLR;  Service: General;  Laterality: N/A;    EXCISIONAL BIOPSY, LYMPH NODE Right 05/26/2023    Procedure: EXCISIONAL BIOPSY, LYMPH NODE, INGUINAL;  Surgeon: Katherine Segura MD;  [facility]  Service: General;  Laterality: Right;    HYSTERECTOMY  1990    FRANDY (AUB, Fibroids), ovaries remain    REPAIR, HERNIA, UMBILICAL N/A 03/23/2023    Procedure: REPAIR, HERNIA, UMBILICAL;  Surgeon: Katherine Segura MD;  Location: [facility] OR 2ND FLR;  Service: General;  Laterality: N/A;    RIGHT HEART CATHETERIZATION Right 01/05/2021    Procedure:  INSERTION, CATHETER, RIGHT HEART;  Surgeon: Aureliano Sy MD;  Location: [facility] CATH LAB;  Service: Cardiology;  Laterality: Right;    RIGHT HEART CATHETERIZATION Right 03/16/2023    Procedure: INSERTION, CATHETER, RIGHT HEART;  Surgeon: Aureliano Sy MD;  Location: [facility] CATH LAB;  Service: Cardiology;  Laterality: Right;    RIGHT HEART CATHETERIZATION Right 11/22/2024    Procedure: INSERTION, CATHETER, RIGHT HEART;  Surgeon: Tamanna Martins MD;  Location: [facility] CATH LAB;  Service: Cardiology;  Laterality: Right;    ROBOT-ASSISTED LAPAROSCOPIC REPAIR OF INGUINAL HERNIA USING DA VERNELL XI Right 05/26/2023    Procedure: XI ROBOTIC REPAIR, HERNIA, INGUINAL, FEMORAL;  Surgeon: Katherine Segura MD;  [facility];  Service: General;  Laterality: Right;    ROBOT-ASSISTED REPAIR OF HIATAL HERNIA USING DA VERNELL XI N/A 03/23/2023    Procedure: XI ROBOTIC REPAIR, HERNIA, HIATAL;  Surgeon: Katherine Segura MD;  [facility];  Service: General;  Laterality: N/A;    VARICOSE VEIN SURGERY      XI ROBOTIC GASTROPEXY N/A 03/23/2023    Procedure: XI ROBOTIC GASTROPEXY;  Surgeon: Katherine Segura MD;  [facility];  Service: General;  Laterality: N/A;    XI ROBOTIC PYLOROMYOTOMY N/A 03/23/2023    Procedure: XI ROBOTIC PYLOROMYOTOMY;  Surgeon: Katherine Segura MD;  [facility];  Service: General;  Laterality: N/A;        Objective:     Vitals  Wt Readings from Last 1 Encounters:   11/22/24 62.3 kg (137 lb 5.6 oz)     Temp Readings from Last 1 Encounters:   11/22/24 98.4 °F (36.9 °C) (Temporal)     BP Readings from Last 1 Encounters:   11/29/24 (!) 112/54     Pulse Readings from Last 1 Encounters:   11/29/24 70       Dermatological Exam    Skin:  Skin of both feet thickened (scleroderma), stiff, with extensive ulcerations of both feet down to fat with slowly improving granulation      Nails:  10 nail(s) elongated, 10 nail(s)  thickened, and 10 nail(s) discolored    Vascular Exam    Arteries:  Posterior tibial artery palpable on right  Dorsalis pedis artery palpable on right  Posterior tibial artery palpable on left  Dorsalis pedis artery palpable on left    Veins:  Superficial veins unremarkable on right  Superficial veins unremarkable on left    Swellin+ nonpitting on right  1+ nonpitting on left    Neurological Exam    [place] touch test:  6/6 sites sensed, light touch intact     Musculoskeletal Exam    Footwear:  Casual on right  Casual on left    Gait Exam:   Ambulatory Status: Ambulatory  Gait: Normal  Assistive Devices: None    Foot Progression Angle:  Normal on right  Normal on left    Right Lower Extremity Additional Findings:  Right foot and ankle function, strength, and range of motion unremarkable except as noted above.     Left Lower Extremity Additional Findings:  Left foot and ankle function, strength, and range of motion unremarkable except as noted above.    Imaging and Other Tests    Imaging:  Independently reviewed and interpreted imaging, findings are as follows: N/A     Assessment:     Encounter Diagnoses   Name Primary?    Ulcers of both lower extremities with fat layer exposed Yes    Scleroderma     Idiopathic neuropathy           Plan:     I counseled the patient on her conditions, their implications and medical management.    Neuropathy: chronic stable  -Continue duloxetine 60 mg/day.   -Start amitriptyline 10 mg/night.     Bilateral ulcers, cellulitis, swelling: chronic stable  -Continue Levofloxacin + Augmentin.    -Dressings changed. Start changing dressings PRN with topical vitamin E to wounds, then usual dressings.   -Protected WB in surgical shoes.      Scleroderma: chronic stable  -Continue PO tadalafil 40 mg/day.   -Start topical vitamin E directly onto wounds during dressing changes.     Return to clinic in 1-2 weeks, call sooner PRN.

## 2024-12-04 ENCOUNTER — APPOINTMENT (OUTPATIENT)
Dept: RADIOLOGY | Facility: CLINIC | Age: 74
End: 2024-12-04
Attending: INTERNAL MEDICINE
Payer: MEDICARE

## 2024-12-04 DIAGNOSIS — Z78.0 MENOPAUSE: ICD-10-CM

## 2024-12-04 PROCEDURE — 77080 DXA BONE DENSITY AXIAL: CPT | Mod: TC,PO,TXP

## 2024-12-04 PROCEDURE — 77080 DXA BONE DENSITY AXIAL: CPT | Mod: 26,NTX,, | Performed by: INTERNAL MEDICINE

## 2024-12-04 NOTE — PROGRESS NOTES
5/20/2020    AUDIOLOGICAL EVALUATION:    Yamel Shah was seen for an audiological evaluation on 5/20/2020 for tinnitus in the left ear. The noise has been there for years but reportedly seems to be worse.    Pure tone threshold testing revealed a high frequency sensorineural hearing loss bilaterally poorer for the left ear.  Speech reception thresholds were obtained at 20 dBHL for the right ear and 25 dBHL for the left ear.  Speech discrimination scores were obtained at 100% for the right ear and 100% for the left ear.    Tympanometry was within normal limits bilaterally indicating normal middle ear function.    Recommend:  1.  Otologic evaluation.  2.  Hearing protection in noise.  3.  Follow up audio as needed.             95

## 2024-12-06 ENCOUNTER — TELEPHONE (OUTPATIENT)
Dept: PODIATRY | Facility: CLINIC | Age: 74
End: 2024-12-06
Payer: MEDICARE

## 2024-12-12 NOTE — PATIENT INSTRUCTIONS
Take an antihistamine daily (allegra/ zyrtec/ xyzal/ or claritin).  Generic medications are okay.  Use a nasal steroid spray such as Flonase or Nasacort daily.   Nasal saline rinse or spray (Ocean spray/ Nasal Mist) will help with congestion. Only use sterile water if using a neti-pot. If using the spray, lean head back and spray each nostril for 2-3 seconds.   Continue this regimen for at least 2 weeks. Expect symptoms to last 7-10 days.   Avoid decongestant medications if you have hypertension, heart disease or abnormal heart rhythm.   Dextromethorphan (DM, delsym) will help to suppress coughing. Do night drive if drowsy.  Warm salt water gargles to alleviate sore throat or lozenges or chloraseptic spray.    
unknown

## 2024-12-13 ENCOUNTER — OFFICE VISIT (OUTPATIENT)
Dept: PODIATRY | Facility: CLINIC | Age: 74
End: 2024-12-13
Payer: MEDICARE

## 2024-12-13 VITALS
BODY MASS INDEX: 22.86 KG/M2 | DIASTOLIC BLOOD PRESSURE: 62 MMHG | HEART RATE: 76 BPM | HEIGHT: 65 IN | SYSTOLIC BLOOD PRESSURE: 112 MMHG

## 2024-12-13 DIAGNOSIS — L97.922 ULCERS OF BOTH LOWER EXTREMITIES WITH FAT LAYER EXPOSED: ICD-10-CM

## 2024-12-13 DIAGNOSIS — L97.912 ULCERS OF BOTH LOWER EXTREMITIES WITH FAT LAYER EXPOSED: ICD-10-CM

## 2024-12-13 DIAGNOSIS — L85.3 XEROSIS OF SKIN: Primary | ICD-10-CM

## 2024-12-13 DIAGNOSIS — M34.9 SCLERODERMA: ICD-10-CM

## 2024-12-13 PROCEDURE — 99999 PR PBB SHADOW E&M-EST. PATIENT-LVL III: CPT | Mod: PBBFAC,,, | Performed by: STUDENT IN AN ORGANIZED HEALTH CARE EDUCATION/TRAINING PROGRAM

## 2024-12-13 PROCEDURE — 99213 OFFICE O/P EST LOW 20 MIN: CPT | Mod: PBBFAC | Performed by: STUDENT IN AN ORGANIZED HEALTH CARE EDUCATION/TRAINING PROGRAM

## 2024-12-13 RX ORDER — AMMONIUM LACTATE 12 G/100G
20 CREAM TOPICAL DAILY
Qty: 770 G | Refills: 2 | Status: SHIPPED | OUTPATIENT
Start: 2024-12-13

## 2024-12-13 NOTE — PROGRESS NOTES
Subjective:     Patient    Yamel Shah is a 74 y.o. female.    Problem    09/25/24: Seen by Dr Morton in 2021 for chronic bilateral foot wounds. Has been caring for them herself for the past few years because she felt she never made much progress with podiatry or wound care. Now presents with chronic bilateral foot wounds, burning, stinging which can sometimes keep her up at night. Previously tried amitriptyline but it cause headaches and nightmares.      10/04/24: Returns for chronic bilateral foot wounds, no new issues. On antibiotics.     10/11/24: Returns for chronic bilateral foot wounds. Had improved pain with duloxetine. Prescribed doxycycline + Levofloxacin but only taking the Levofloxacin.     10/24/24: Returns for chronic bilateral foot wounds. On Levofloxacin + Augmentin. Leaving town next week.     11/05/24: Returns for chronic bilateral foot wounds. Pain definitely improved with duloxetine but still present, no side effects. Still significant drainage both feet. Out of antibiotics, no side effects.     11/21/24: Returns for chronic bilateral foot wounds. Only started the new dose of duloxetine a couple days ago, having mild headache but continuing. Needs more antibiotics.     11/29/24: Returns for chronic bilateral foot wounds. Still having significant pain despite duloxetine and other medications.     12/13/24: Returns for chronic bilateral foot wounds. Started topical vitamin E since last visit but it burned so she stopped. Almost done with current antibiotics. Agrees the wounds are slowly improving. Also now concerned about dry skin BLE not improving with OTC lotions.     History    History obtained from patient and review of medical records.     Past Medical History:   Diagnosis Date    Abnormal Pap smear     Acid reflux     Allergy     Arthritis     Encounter for blood transfusion     GERD (gastroesophageal reflux disease)     Hiatal hernia 03/24/2023    History of migraine  headaches     Hx of colonic polyp     Hypertension     Idiopathic neuropathy 07/20/2012    ILD (interstitial lung disease) 11/06/2013    Iron deficiency anemia 03/18/2014    MRSA carrier     Osteopenia     Pneumonia     Pulmonary fibrosis     Pulmonary hypertension     Raynaud's disease     Scleroderma, diffuse     Sjogren's syndrome     Vitamin D deficiency 11/14/2013       Past Surgical History:   Procedure Laterality Date    24 HOUR IMPEDANCE PH MONITORING OF ESOPHAGUS IN PATIENT NOT TAKING ACID REDUCING MEDICATIONS N/A 03/04/2020    Procedure: IMPEDANCE PH STUDY, ESOPHAGEAL, 24 HOUR, IN PATIENT NOT TAKING ACID REDUCING MEDICATION;  Surgeon: Annamaria Mendoza MD;  Location: [facility] (4TH FLR);  Service: Endoscopy;  Laterality: N/A;  OFF PPI/H2 Blocker   Motility Studies   Hold Narcotics x 1 days   Hold TCA x 1 days  2/26 - LVM attempting to confirm appt  2/27 - Confirmed appt    ANORECTAL MANOMETRY N/A 05/10/2021    Procedure: MANOMETRY, ANORECTAL with balloon expulsion test;  Surgeon: Annamaria Mendoza MD;  Location: [facility] ENDO (4TH FLR);  Service: Endoscopy;  Laterality: N/A;  order combined  covid test 5/7 [employer], instructions emailed-[facility]    BREAST BIOPSY      Left, benign    BRONCHOSCOPY N/A 10/2/2023    Procedure: bronch;  Surgeon: Roberta Leigh DO;  Location: [facility] OR 2ND FLR;  Service: Endoscopy;  Laterality: N/A;    BRONCHOSCOPY N/A 9/16/2024    Procedure: Flexible bronchoscopy (BAL only);  Surgeon: Roberta Leigh DO;  Location: [facility] OR 2ND FLR;  Service: Endoscopy;  Laterality: N/A;    CERVICAL CONIZATION   W/ LASER  1970    COLONOSCOPY      COLONOSCOPY N/A 03/29/2019    Procedure: COLONOSCOPY;  Surgeon: Annamaria Mendoza MD;  Location: [facility] ENDO (2ND FLR);  Service: Endoscopy;  Laterality: N/A;    COLONOSCOPY N/A 05/10/2021    Procedure: COLONOSCOPY;  Surgeon: Annamaria Mendoza MD;  Location: [facility] ENDO (4TH FLR);  Service: Endoscopy;  Laterality: N/A;  2nd floor-previous scopes done on 2nd  floor, gastroparesis  full liquid diet x2 days, clear liquid x1 day prior to procedure  covid test 5/7 [employer], instructions emailed-[facility]  5/6 pt confirmed appt-[facility]    DILATION AND CURETTAGE OF UTERUS      ESOPHAGEAL MANOMETRY WITH MEASUREMENT OF IMPEDANCE N/A 03/04/2020    Procedure: MANOMETRY, ESOPHAGUS, WITH IMPEDANCE MEASUREMENT;  Surgeon: Annamaria Mendoza MD;  Location: [facility] (4TH FLR);  Service: Endoscopy;  Laterality: N/A;  OFF PPI/H2 Blocker   Motility Studies   Hold Narcotics x 1 days   Hold TCA x 1 days    ESOPHAGOGASTRODUODENOSCOPY      ESOPHAGOGASTRODUODENOSCOPY N/A 03/29/2019    Procedure: EGD (ESOPHAGOGASTRODUODENOSCOPY);  Surgeon: Annamaria Mendoza MD;  Location: [facility] (2ND FLR);  Service: Endoscopy;  Laterality: N/A;  pulmonary htn    ESOPHAGOGASTRODUODENOSCOPY N/A 02/02/2021    Procedure: ESOPHAGOGASTRODUODENOSCOPY (EGD);  Surgeon: Annamaria Mendoza MD;  Location: [facility] (2ND FLR);  Service: Endoscopy;  Laterality: N/A;  2nd floor gastroparesis  3 days full liquid diet and 1 day clears  covid test 1/30 primary care, instructions sent to [facility]    ESOPHAGOGASTRODUODENOSCOPY N/A 07/01/2022    Procedure: ESOPHAGOGASTRODUODENOSCOPY (EGD);  Surgeon: Annamaria Mendoza MD;  Location: [facility] (2ND FLR);  Service: Endoscopy;  Laterality: N/A;  2nd floor-gastroparesis  full liquid diet x3 days, clear liquid diet x1 day prior to procedure  fully vaccinated, instructions sent to [facility]  6/29-pt confirmed new arrival time-[facility]    EXCISION, LESION, STOMACH, LAPAROSCOPIC N/A 03/23/2023    Procedure: XI ROBOTIC EXCISION, LESION, STOMACH;  Surgeon: Katherine Segura MD;  Location: [facility] OR [address] [place] [address];  Service: General;  Laterality: N/A;    EXCISIONAL BIOPSY, LYMPH NODE Right 05/26/2023    Procedure: EXCISIONAL BIOPSY, LYMPH NODE, INGUINAL;  Surgeon: Katherine Segura MD;  Location: [facility] OR 2ND FLR;  Service: General;  Laterality: Right;    HYSTERECTOMY  1990    FRANDY (AUB,  Fibroids), ovaries remain    REPAIR, HERNIA, UMBILICAL N/A 03/23/2023    Procedure: REPAIR, HERNIA, UMBILICAL;  Surgeon: Katherine Segura MD;  Location: [facility] OR [facility] FLR;  Service: General;  Laterality: N/A;    RIGHT HEART CATHETERIZATION Right 01/05/2021    Procedure: INSERTION, CATHETER, RIGHT HEART;  Surgeon: Aureliano Sy MD;  Location: [facility] CATH LAB;  Service: Cardiology;  Laterality: Right;    RIGHT HEART CATHETERIZATION Right 03/16/2023    Procedure: INSERTION, CATHETER, RIGHT HEART;  Surgeon: Aureliano Sy MD;  Location: [facility] CATH LAB;  Service: Cardiology;  Laterality: Right;    RIGHT HEART CATHETERIZATION Right 11/22/2024    Procedure: INSERTION, CATHETER, RIGHT HEART;  Surgeon: Tamanna Martins MD;  Location: [facility] CATH LAB;  Service: Cardiology;  Laterality: Right;    ROBOT-ASSISTED LAPAROSCOPIC REPAIR OF INGUINAL HERNIA USING DA VERNELL XI Right 05/26/2023    Procedure: XI ROBOTIC REPAIR, HERNIA, INGUINAL, FEMORAL;  Surgeon: Katherine Segura MD;  [facility];  Service: General;  Laterality: Right;    ROBOT-ASSISTED REPAIR OF HIATAL HERNIA USING DA VERNELL XI N/A 03/23/2023    Procedure: XI ROBOTIC REPAIR, HERNIA, HIATAL;  Surgeon: Katherine Segura MD;  Location: [facility] OR [address] [place] [address];  Service: General;  Laterality: N/A;    VARICOSE VEIN SURGERY      XI ROBOTIC GASTROPEXY N/A 03/23/2023    Procedure: XI ROBOTIC GASTROPEXY;  Surgeon: Katherine Segura MD;  [facility];  Service: General;  Laterality: N/A;    XI ROBOTIC PYLOROMYOTOMY N/A 03/23/2023    Procedure: XI ROBOTIC PYLOROMYOTOMY;  Surgeon: Katherine Segura MD;  [facility];  Service: General;  Laterality: N/A;        Objective:     Vitals  Wt Readings from Last 1 Encounters:   11/22/24 62.3 kg (137 lb 5.6 oz)     Temp Readings from Last 1 Encounters:   11/22/24 98.4 °F (36.9 °C) (Temporal)     BP Readings from Last 1 Encounters:   12/13/24 112/62     Pulse  Readings from Last 1 Encounters:   24 76       Dermatological Exam    Skin:  Skin of both feet thickened (scleroderma), stiff, with extensive ulcerations of both feet down to fat with slowly improving granulation; xerosis      Nails:  10 nail(s) thickened, and 10 nail(s) discolored    Vascular Exam    Arteries:  Posterior tibial artery palpable on right  Dorsalis pedis artery palpable on right  Posterior tibial artery palpable on left  Dorsalis pedis artery palpable on left    Veins:  Superficial veins unremarkable on right  Superficial veins unremarkable on left    Swellin+ nonpitting on right  1+ nonpitting on left    Neurological Exam    [place] touch test:  6/6 sites sensed, light touch intact     Musculoskeletal Exam    Footwear:  Casual on right  Casual on left    Gait Exam:   Ambulatory Status: Ambulatory  Gait: Normal  Assistive Devices: None    Foot Progression Angle:  Normal on right  Normal on left    Right Lower Extremity Additional Findings:  Right foot and ankle function, strength, and range of motion unremarkable except as noted above.     Left Lower Extremity Additional Findings:  Left foot and ankle function, strength, and range of motion unremarkable except as noted above.    Imaging and Other Tests    Imaging:  Independently reviewed and interpreted imaging, findings are as follows: N/A     Assessment:     Encounter Diagnoses   Name Primary?    Xerosis of skin Yes    Ulcers of both lower extremities with fat layer exposed     Scleroderma             Plan:     I counseled the patient on her conditions, their implications and medical management.    Neuropathy: chronic stable  -Continue duloxetine 60 mg/day.   -Continue amitriptyline 10 mg/night.     Bilateral ulcers, cellulitis, swelling: chronic stable  -Complete current Levofloxacin + Augmentin, then can stop.    -Dressings changed. Continue usual dressing changes.   -Protected WB in surgical shoes.      Scleroderma: chronic  stable  -Continue PO tadalafil 40 mg/day.      Return to clinic in 1-2 weeks, call sooner PRN.

## 2024-12-19 ENCOUNTER — TELEPHONE (OUTPATIENT)
Dept: PODIATRY | Facility: CLINIC | Age: 74
End: 2024-12-19

## 2024-12-19 ENCOUNTER — OFFICE VISIT (OUTPATIENT)
Dept: PODIATRY | Facility: CLINIC | Age: 74
End: 2024-12-19
Payer: MEDICARE

## 2024-12-19 VITALS
DIASTOLIC BLOOD PRESSURE: 64 MMHG | HEIGHT: 65 IN | HEART RATE: 81 BPM | SYSTOLIC BLOOD PRESSURE: 148 MMHG | BODY MASS INDEX: 23.07 KG/M2 | WEIGHT: 138.44 LBS

## 2024-12-19 DIAGNOSIS — L97.912 ULCERS OF BOTH LOWER EXTREMITIES WITH FAT LAYER EXPOSED: Primary | ICD-10-CM

## 2024-12-19 DIAGNOSIS — L97.922 ULCERS OF BOTH LOWER EXTREMITIES WITH FAT LAYER EXPOSED: Primary | ICD-10-CM

## 2024-12-19 PROCEDURE — 99212 OFFICE O/P EST SF 10 MIN: CPT | Mod: PBBFAC | Performed by: STUDENT IN AN ORGANIZED HEALTH CARE EDUCATION/TRAINING PROGRAM

## 2024-12-19 PROCEDURE — 99999 PR PBB SHADOW E&M-EST. PATIENT-LVL II: CPT | Mod: PBBFAC,,, | Performed by: STUDENT IN AN ORGANIZED HEALTH CARE EDUCATION/TRAINING PROGRAM

## 2024-12-19 PROCEDURE — 99215 OFFICE O/P EST HI 40 MIN: CPT | Mod: S$PBB,,, | Performed by: STUDENT IN AN ORGANIZED HEALTH CARE EDUCATION/TRAINING PROGRAM

## 2024-12-19 NOTE — TELEPHONE ENCOUNTER
Spoke with patient in regards to early am appointment on 12/26/2024 with Dr. Nava(Podiatry) instead of pm appt.. patient has confirmed she is able to come in on 01/03/2025 @11:00am

## 2024-12-19 NOTE — PROGRESS NOTES
Subjective:     Patient    Yamel Shah is a 74 y.o. female.    Problem    09/25/24: Seen by Dr Morton in 2021 for chronic bilateral foot wounds. Has been caring for them herself for the past few years because she felt she never made much progress with podiatry or wound care. Now presents with chronic bilateral foot wounds, burning, stinging which can sometimes keep her up at night. Previously tried amitriptyline but it cause headaches and nightmares.      10/04/24: Returns for chronic bilateral foot wounds, no new issues. On antibiotics.     10/11/24: Returns for chronic bilateral foot wounds. Had improved pain with duloxetine. Prescribed doxycycline + Levofloxacin but only taking the Levofloxacin.     10/24/24: Returns for chronic bilateral foot wounds. On Levofloxacin + Augmentin. Leaving [facility] next week.     11/05/24: Returns for chronic bilateral foot wounds. Pain definitely improved with duloxetine but still present, no side effects. Still significant drainage both feet. Out of antibiotics, no side effects.     11/21/24: Returns for chronic bilateral foot wounds. Only started the new dose of duloxetine a couple days ago, having mild headache but continuing. Needs more antibiotics.     11/29/24: Returns for chronic bilateral foot wounds. Still having significant pain despite duloxetine and other medications.     12/13/24: Returns for chronic bilateral foot wounds. Started topical vitamin E since last visit but it burned so she stopped. Almost done with current antibiotics. Agrees the wounds are slowly improving. Also now concerned about dry skin BLE not improving with OTC lotions.     12/19/24: Returns for chronic bilateral foot wounds. Completed PO antibiotics. No new concerns. Leaving [facility] for [place].     History    History obtained from patient and review of medical records.     Past Medical History:   Diagnosis Date    Abnormal Pap smear     Acid reflux     Allergy     Arthritis      Encounter for blood transfusion     GERD (gastroesophageal reflux disease)     Hiatal hernia 03/24/2023    History of migraine headaches     Hx of colonic polyp     Hypertension     Idiopathic neuropathy 07/20/2012    ILD (interstitial lung disease) 11/06/2013    Iron deficiency anemia 03/18/2014    MRSA carrier     Osteopenia     Pneumonia     Pulmonary fibrosis     Pulmonary hypertension     Raynaud's disease     Scleroderma, diffuse     Sjogren's syndrome     Vitamin D deficiency 11/14/2013       Past Surgical History:   Procedure Laterality Date    24 HOUR IMPEDANCE PH MONITORING OF ESOPHAGUS IN PATIENT NOT TAKING ACID REDUCING MEDICATIONS N/A 03/04/2020    Procedure: IMPEDANCE PH STUDY, ESOPHAGEAL, 24 HOUR, IN PATIENT NOT TAKING ACID REDUCING MEDICATION;  Surgeon: Annamaria Mendoza MD;  Location: [facility] (4TH FLR);  Service: Endoscopy;  Laterality: N/A;  OFF PPI/H2 Blocker   Motility Studies   Hold Narcotics x 1 days   Hold TCA x 1 days  2/26 - LVM attempting to confirm appt  2/27 - Confirmed appt    ANORECTAL MANOMETRY N/A 05/10/2021    Procedure: MANOMETRY, ANORECTAL with balloon expulsion test;  Surgeon: Annamaria Mendoza MD;  Location: [facility] (4TH FLR);  Service: Endoscopy;  Laterality: N/A;  order combined  covid test 5/7 [place], instructions emailed-[facility]    BREAST BIOPSY      Left, benign    BRONCHOSCOPY N/A 10/2/2023    Procedure: bronch;  Surgeon: Roberta Leigh DO;  Location: [facility] OR 2ND FLR;  Service: Endoscopy;  Laterality: N/A;    BRONCHOSCOPY N/A 9/16/2024    Procedure: Flexible bronchoscopy (BAL only);  Surgeon: Roberta Leigh DO;  Location: [facility] OR 2ND FLR;  Service: Endoscopy;  Laterality: N/A;    CERVICAL CONIZATION   W/ LASER  1970    COLONOSCOPY      COLONOSCOPY N/A 03/29/2019    Procedure: COLONOSCOPY;  Surgeon: Annamaria Mendoza MD;  Location: [facility] ENDO (2ND FLR);  Service: Endoscopy;  Laterality: N/A;    COLONOSCOPY N/A 05/10/2021    Procedure: COLONOSCOPY;  Surgeon:  Annamaria Mendoza MD;  Location: [facility] (4TH FLR);  Service: Endoscopy;  Laterality: N/A;  2nd floor-previous scopes done on 2nd floor, gastroparesis  full liquid diet x2 days, clear liquid x1 day prior to procedure  covid test 5/7 [employer], instructions emailed-[facility] [place]  5/6 pt confirmed appt-[facility] [place]    DILATION AND CURETTAGE OF UTERUS      ESOPHAGEAL MANOMETRY WITH MEASUREMENT OF IMPEDANCE N/A 03/04/2020    Procedure: MANOMETRY, ESOPHAGUS, WITH IMPEDANCE MEASUREMENT;  Surgeon: Annamaria Mendoza MD;  Location: [facility] (4TH FLR);  Service: Endoscopy;  Laterality: N/A;  OFF PPI/H2 Blocker   Motility Studies   Hold Narcotics x 1 days   Hold TCA x 1 days    ESOPHAGOGASTRODUODENOSCOPY      ESOPHAGOGASTRODUODENOSCOPY N/A 03/29/2019    Procedure: EGD (ESOPHAGOGASTRODUODENOSCOPY);  Surgeon: Annamaria Mendoza MD;  Location: [facility] (2ND FLR);  Service: Endoscopy;  Laterality: N/A;  pulmonary htn    ESOPHAGOGASTRODUODENOSCOPY N/A 02/02/2021    Procedure: ESOPHAGOGASTRODUODENOSCOPY (EGD);  Surgeon: Annamaria Mendoza MD;  Location: [facility] (2ND FLR);  Service: Endoscopy;  Laterality: N/A;  2nd floor gastroparesis  3 days full liquid diet and 1 day clears  covid test 1/30 primary care, instructions sent to [facility]    ESOPHAGOGASTRODUODENOSCOPY N/A 07/01/2022    Procedure: ESOPHAGOGASTRODUODENOSCOPY (EGD);  Surgeon: Annamaria Mendoza MD;  Location: [facility] (2ND FLR);  Service: Endoscopy;  Laterality: N/A;  2nd floor-gastroparesis  full liquid diet x3 days, clear liquid diet x1 day prior to procedure  fully vaccinated, instructions sent to [facility]  6/29-pt confirmed new arrival time-\[facility] [place] [facility]\""    EXCISION, LESION, STOMACH, LAPAROSCOPIC N/A 03/23/2023    Procedure: XI ROBOTIC EXCISION, LESION, STOMACH;  Surgeon: Katherine Segura MD;  [facility];  Service: General;  Laterality: N/A;    EXCISIONAL BIOPSY, LYMPH NODE Right 05/26/2023    Procedure: EXCISIONAL BIOPSY, LYMPH NODE, INGUINAL;  Surgeon: Katherine  TERRELL Segura MD;  Location: [facility] OR 2ND FLR;  Service: General;  Laterality: Right;    HYSTERECTOMY  1990    FRANDY (AUB, Fibroids), ovaries remain    REPAIR, HERNIA, UMBILICAL N/A 03/23/2023    Procedure: REPAIR, HERNIA, UMBILICAL;  Surgeon: Katherine Segura MD;  Location: [facility] OR 2ND FLR;  Service: General;  Laterality: N/A;    RIGHT HEART CATHETERIZATION Right 01/05/2021    Procedure: INSERTION, CATHETER, RIGHT HEART;  Surgeon: Aureliano Sy MD;  Location: [facility] CATH LAB;  Service: Cardiology;  Laterality: Right;    RIGHT HEART CATHETERIZATION Right 03/16/2023    Procedure: INSERTION, CATHETER, RIGHT HEART;  Surgeon: Aureliano Sy MD;  Location: [facility] CATH LAB;  Service: Cardiology;  Laterality: Right;    RIGHT HEART CATHETERIZATION Right 11/22/2024    Procedure: INSERTION, CATHETER, RIGHT HEART;  Surgeon: Tamanna Martins MD;  Location: [facility] CATH LAB;  Service: Cardiology;  Laterality: Right;    ROBOT-ASSISTED LAPAROSCOPIC REPAIR OF INGUINAL HERNIA USING DA VERNELL XI Right 05/26/2023    Procedure: XI ROBOTIC REPAIR, HERNIA, INGUINAL, FEMORAL;  Surgeon: Katherine Segura MD;  [facility];  Service: General;  Laterality: Right;    ROBOT-ASSISTED REPAIR OF HIATAL HERNIA USING DA VERNELL XI N/A 03/23/2023    Procedure: XI ROBOTIC REPAIR, HERNIA, HIATAL;  Surgeon: Katherine Segura MD;  [facility];  Service: General;  Laterality: N/A;    VARICOSE VEIN SURGERY      XI ROBOTIC GASTROPEXY N/A 03/23/2023    Procedure: XI ROBOTIC GASTROPEXY;  Surgeon: Katherine Segura MD;  [facility];  Service: General;  Laterality: N/A;    XI ROBOTIC PYLOROMYOTOMY N/A 03/23/2023    Procedure: XI ROBOTIC PYLOROMYOTOMY;  Surgeon: Katherine Segura MD;  Location: [facility] OR 2ND FLR;  Service: General;  Laterality: N/A;        Objective:     Vitals  Wt Readings from Last 1 Encounters:   12/19/24 62.8 kg (138 lb 7.2 oz)     Temp Readings from  Last 1 Encounters:   24 98.4 °F (36.9 °C) (Temporal)     BP Readings from Last 1 Encounters:   24 (!) 148/64     Pulse Readings from Last 1 Encounters:   24 81       Dermatological Exam    Skin:  Skin of both feet thickened (scleroderma), stiff, with extensive ulcerations of both feet down to fat with slowly improving granulation; xerosis      Nails:  10 nail(s) thickened, and 10 nail(s) discolored    Vascular Exam    Arteries:  Posterior tibial artery palpable on right  Dorsalis pedis artery palpable on right  Posterior tibial artery palpable on left  Dorsalis pedis artery palpable on left    Veins:  Superficial veins unremarkable on right  Superficial veins unremarkable on left    Swellin+ nonpitting on right  1+ nonpitting on left    Neurological Exam    [place] touch test:  6/6 sites sensed, light touch intact     Musculoskeletal Exam    Footwear:  Casual on right  Casual on left    Gait Exam:   Ambulatory Status: Ambulatory  Gait: Normal  Assistive Devices: None    Foot Progression Angle:  Normal on right  Normal on left    Right Lower Extremity Additional Findings:  Right foot and ankle function, strength, and range of motion unremarkable except as noted above.     Left Lower Extremity Additional Findings:  Left foot and ankle function, strength, and range of motion unremarkable except as noted above.    Imaging and Other Tests    Imaging:  Independently reviewed and interpreted imaging, findings are as follows: N/A     Assessment:     Encounter Diagnosis   Name Primary?    Ulcers of both lower extremities with fat layer exposed Yes          Plan:     I counseled the patient on her conditions, their implications and medical management.    Neuropathy: chronic stable  -Continue duloxetine 60 mg/day.   -Continue amitriptyline 10 mg/night.     Bilateral ulcers, cellulitis, swelling: chronic stable   -Dressings changed. Leave intact as long as possible then continue usual dressing changes.    -Protected WB in surgical shoes.      Scleroderma: chronic stable  -Continue PO tadalafil 40 mg/day.      I spent a total of 40 minutes on the day of the visit.  This includes face to face time and non-face to face time preparing to see the patient (eg, review of tests), obtaining and/or reviewing separately obtained history, documenting clinical information in the electronic or other health record, independently interpreting results and communicating results to the patient/family/caregiver, or care coordinator.     Return to clinic in 1-2 weeks, call sooner PRN.

## 2024-12-30 NOTE — TELEPHONE ENCOUNTER
Patient requesting refill on: Lyrica  & Tylenol #4  Last office visit: 11.05.24   shows last refill on: 10.26.24  Patient does have a pain contract on file with [facility] department  Patient last UDS: 07.30.24 was consistent with current therapy.                    CODEINE  Present Present CM   Not Detected     Comment: INTERPRETIVE INFORMATION: Codeine, U  Positive Cutoff: 40 ng/mL  Methodology: Mass Spectrometry   MORPHINE  Present Present CM   Not Detected     Comment: INTERPRETIVE INFORMATION:Morphine, U  Positive Cutoff: 20 ng/mL  Methodology: Mass Spectrometry   6-ACETYLMORPHINE  Not Detected Not Detected CM   Not Detected     Comment: INTERPRETIVE INFORMATION:6-acetylmorphine, U  Positive Cutoff: 20 ng/mL  Methodology: Mass Spectrometry   OXYCODONE  Not Detected Not Detected CM   Not Detected     Comment: INTERPRETIVE INFORMATION:Oxycodone, U  Positive Cutoff: 40 ng/mL  Methodology: Mass Spectrometry   NOROYXCODONE  Not Detected Not Detected CM   Not Detected     Comment: INTERPRETIVE INFORMATION:Noroxycodone, U  Positive Cutoff: 100 ng/mL  Methodology: Mass Spectrometry   OXYMORPHONE  Not Detected Not Detected CM   Not Detected     Comment: INTERPRETIVE INFORMATION:Oxymorphone, U  Positive Cutoff: 40 ng/mL  Methodology: Mass Spectrometry   NOROXYMORPHONE  Not Detected Not Detected CM   Not Detected     Comment: INTERPRETIVE INFORMATION:Noroxymorphone, U  Positive Cutoff: 100 ng/mL  Methodology: Mass Spectrometry   HYDROCODONE  Present Not Detected CM   Not Detected     Comment: INTERPRETIVE INFORMATION:Hydrocodone, U  Positive Cutoff: 40 ng/mL  Methodology: Mass Spectrometry   NORHYDROCODONE  Not Detected Not Detected CM   Not Detected     Comment: INTERPRETIVE INFORMATION:Norhydrocodone, U  Positive Cutoff: 100 ng/mL  Methodology: Mass Spectrometry   HYDROMORPHONE  Not Detected Not Detected CM   Not Detected     Comment: INTERPRETIVE INFORMATION:Hydromorphone, U  Positive Cutoff: 20  ng/mL  Methodology: Mass Spectrometry   BUPRENORPHINE  Not Detected Not Detected CM   Not Detected     Comment: INTERPRETIVE INFORMATION:Buprenorphine, U  Positive Cutoff: 5 ng/mL  Methodology: Mass Spectrometry   NORUBPRENORPHINE  Not Detected Not Detected CM   Not Detected     Comment: INTERPRETIVE INFORMATION:Norbuprenorphine, U  Positive Cutoff: 20 ng/mL  Methodology: Mass Spectrometry   FENTANYL  Not Detected Not Detected CM   Not Detected     Comment: INTERPRETIVE INFORMATION:Fentanyl, U  Positive Cutoff: 2 ng/mL  Methodology: Mass Spectrometry   NORFENTANYL  Not Detected Not Detected CM   Not Detected     Comment: INTERPRETIVE INFORMATION:Norfentanyl, U  Positive Cutoff: 2 ng/mL  Methodology: Mass Spectrometry   MEPERIDINE METABOLITE  Not Detected Not Detected CM   Not Detected     Comment: INTERPRETIVE INFORMATION:Meperidine metabolite, U  Positive Cutoff: 50 ng/mL  Methodology: Mass Spectrometry   TAPENTADOL  Not Detected Not Detected CM   Not Detected     Comment: INTERPRETIVE INFORMATION:Tapentadol, U  Positive Cutoff: 100 ng/mL  Methodology: Mass Spectrometry   TAPENTADOL-O-SULF  Not Detected Not Detected CM   Not Detected     Comment: INTERPRETIVE INFORMATION:Tapentadol-o-Sulf, U  Positive Cutoff: 200 ng/mL  Methodology: Mass Spectrometry   METHADONE  Negative Negative CM   Not Detected     Comment: Presumptive negative by immunoassay. Testing by mass  spectrometry is available on request.  INTERPRETIVE INFORMATION: Methadone Screen, U  Positive Cutoff: 150 ng/mL  Methodology: Immunoassay   TRAMADOL  Negative Negative CM   Not Detected     Comment: Presumptive negative by immunoassay. Testing by mass  spectrometry is available on request.  INTERPRETIVE INFORMATION:Tramadol Screen, U  Positive Cutoff: 100 ng/mL  Methodology: Immunoassay   AMPHETAMINE  Not Detected Not Detected CM   Not Detected     Comment: INTERPRETIVE INFORMATION:Amphetamine, U  Positive Cutoff: 50 ng/mL  Methodology: Mass Spectrometry    METHAMPHETAMINE  Not Detected Not Detected CM   Not Detected     Comment: INTERPRETIVE INFORMATION:Methamphetamine, U  Positive Cutoff: 200 ng/mL  Methodology: Mass Spectrometry   MDMA- ECSTASY  Not Detected Not Detected CM   Not Detected     Comment: INTERPRETIVE INFORMATION:MDMA, U  Positive Cutoff: 200 ng/mL  Methodology: Mass Spectrometry   MDA  Not Detected Not Detected CM   Not Detected     Comment: INTERPRETIVE INFORMATION:MDA, U  Positive Cutoff: 200 ng/mL  Methodology: Mass Spectrometry   MDEA- Mickie  Not Detected Not Detected CM   Not Detected     Comment: INTERPRETIVE INFORMATION:MDEA, U  Positive Cutoff: 200 ng/mL  Methodology: Mass Spectrometry   METHYLPHENIDATE  Not Detected Not Detected CM   Not Detected     Comment: INTERPRETIVE INFORMATION:Methylphenidate, U  Positive Cutoff: 100 ng/mL  Methodology: Mass Spectrometry   PHENTERMINE  Not Detected Not Detected CM   Not Detected     Comment: INTERPRETIVE INFORMATION:Phentermine, U  Positive Cutoff: 100 ng/mL  Methodology: Mass Spectrometry   BENZOYLECGONINE  Negative Negative CM   Not Detected     Comment: Presumptive negative by immunoassay. Testing by mass  spectrometry is available on request.  INTERPRETIVE INFORMATION:Cocaine Screen, U  Positive Cutoff: 150 ng/mL  Methodology: Immunoassay   ALPRAZOLAM  Not Detected Not Detected CM   Not Detected     Comment: INTERPRETIVE INFORMATION:Alprazolam, U  Positive Cutoff: 40 ng/mL  Methodology: Mass Spectrometry   ALPHA-OH-ALPRAZOLAM  Not Detected Not Detected CM   Not Detected     Comment: INTERPRETIVE INFORMATION:Alpha-OH-Alprazolam, U  Positive Cutoff: 20 ng/mL  Methodology: Mass Spectrometry   CLONAZEPAM  Not Detected Not Detected CM   Not Detected     Comment: INTERPRETIVE INFORMATION:Clonazepam, U  Positive Cutoff: 20 ng/mL  Methodology: Mass Spectrometry   7-AMINOCLONAZEPAM  Not Detected Not Detected CM   Not Detected     Comment: INTERPRETIVE INFORMATION:7-Aminoclonazepam, U  Positive Cutoff: 40  ng/mL  Methodology: Mass Spectrometry   DIAZEPAM  Not Detected Not Detected CM   Not Detected     Comment: INTERPRETIVE INFORMATION:Diazepam, U  Positive Cutoff: 50 ng/mL  Methodology: Mass Spectrometry   NORDIAZEPAM  Not Detected Not Detected CM   Not Detected     Comment: INTERPRETIVE INFORMATION:Nordiazepam, U  Positive Cutoff: 50 ng/mL  Methodology: Mass Spectrometry   OXAZEPAM  Not Detected Not Detected CM   Not Detected     Comment: INTERPRETIVE INFORMATION:Oxazepam, U  Positive Cutoff: 50 ng/mL  Methodology: Mass Spectrometry   TEMAZEPAM  Not Detected Not Detected CM   Not Detected     Comment: INTERPRETIVE INFORMATION:Temazepam, U  Positive Cutoff: 50 ng/mL  Methodology: Mass Spectrometry   Lorazepam  Not Detected Not Detected CM   Not Detected     Comment: INTERPRETIVE INFORMATION:Lorazepam, U  Positive Cutoff: 60 ng/mL  Methodology: Mass Spectrometry   MIDAZOLAM  Not Detected Not Detected CM   Not Detected     Comment: INTERPRETIVE INFORMATION:Midazolam, U  Positive Cutoff: 20 ng/mL  Methodology: Mass Spectrometry   ZOLPIDEM  Not Detected Not Detected CM   Not Detected     Comment: INTERPRETIVE INFORMATION:Zolpidem, U  Positive Cutoff: 20 ng/mL  Methodology: Mass Spectrometry   BARBITURATES  Negative Negative CM   Not Detected     Comment: Presumptive negative by immunoassay. Testing by mass  spectrometry is available on request.  INTERPRETIVE INFORMATION:Barbiturates Screen, U  Positive Cutoff: 200 ng/mL  Methodology: Immunoassay   Creatinine, Urine 20.0 - 400.0 mg/dL 145.0 112.0 47.0 R 103.0 R 344.5 139.0 R,  R   ETHYL GLUCURONIDE  Negative Negative CM   Present     Comment: Presumptive negative by immunoassay. Testing by mass  spectrometry is available on request.  INTERPRETIVE INFORMATION:Ethyl Glucuronide Screen, U  Positive Cutoff: 500 ng/mL  Methodology: Immunoassay   MARIJUANA METABOLITE  Negative Negative CM   Not Detected     Comment: Presumptive negative by immunoassay. Testing by  mass  spectrometry is available on request.  INTERPRETIVE INFORMATION: THC (Cannabinoids) Screen, U  Positive Cutoff: 50 ng/mL  Methodology: Immunoassay   PCP  Negative Negative CM   Not Detected     Comment: Presumptive negative by immunoassay. Testing by mass  spectrometry is available on request.  INTERPRETIVE INFORMATION:Phencyclidine Screen, U  Positive Cutoff: 25 ng/mL  Methodology: Immunoassay   CARISOPRODOL  Negative Negative CM   Not Detected CM     Comment: Presumptive negative by immunoassay. Testing by mass  spectrometry is available on request.  INTERPRETIVE INFORMATION: Carisoprodol Screen, U  Positive Cutoff: 100 ng/mL  Methodology: Immunoassay  The carisoprodol immunoassay has cross-reactivity to  carisoprodol and meprobamate.   Naloxone  Not Detected Not Detected CM   Not Detected     Comment: INTERPRETIVE INFORMATION:Naloxone, U  Positive Cutoff: 100 ng/mL  Methodology: Mass Spectrometry   Gabapentin  Not Detected Not Detected CM   Not Detected     Comment: INTERPRETIVE INFORMATION:Gabapentin, U  Positive Cutoff: 3,000 ng/mL  Methodology: Mass Spectrometry   Pregabalin  Present Present CM   Present     Comment: INTERPRETIVE INFORMATION:Pregabalin, U  Positive Cutoff: 3,000 ng/mL  Methodology: Mass Spectrometry   Alpha-OH-Midazolam  Not Detected Not Detected CM   Not Detected     Comment: INTERPRETIVE INFORMATION:Alpha-OH-Midazolam, U  Positive Cutoff: 20 ng/mL  Methodology: Mass Spectrometry   Zolpidem Metabolite  Not Detected Not Detected CM   Not Detected

## 2024-12-30 NOTE — PROGRESS NOTES
Patient presented to clinic, per Dr. Leigh, obtain RIP due to symptoms, and chest x-ray. Patient verbalized understanding. Discussed testing by respiratory panel, nasopharyngeal swab. Patient stated name and date of birth. Reviewed use of [facility], visit information, use of over the counter medications. All questions answered at this time.   intact

## 2024-12-31 RX ORDER — PREGABALIN 300 MG/1
300 CAPSULE ORAL 2 TIMES DAILY
Qty: 60 CAPSULE | Refills: 6 | Status: SHIPPED | OUTPATIENT
Start: 2024-12-31 | End: 2025-07-01

## 2024-12-31 RX ORDER — ACETAMINOPHEN AND CODEINE PHOSPHATE 300; 60 MG/1; MG/1
1 TABLET ORAL NIGHTLY
Qty: 30 TABLET | Refills: 0 | Status: SHIPPED | OUTPATIENT
Start: 2024-12-31 | End: 2025-01-30

## 2025-01-03 ENCOUNTER — OFFICE VISIT (OUTPATIENT)
Dept: PODIATRY | Facility: CLINIC | Age: 75
End: 2025-01-03
Payer: MEDICARE

## 2025-01-03 VITALS
HEIGHT: 65 IN | SYSTOLIC BLOOD PRESSURE: 101 MMHG | DIASTOLIC BLOOD PRESSURE: 58 MMHG | BODY MASS INDEX: 23.04 KG/M2 | HEART RATE: 70 BPM

## 2025-01-03 DIAGNOSIS — L03.119 CELLULITIS OF LOWER EXTREMITY, UNSPECIFIED LATERALITY: ICD-10-CM

## 2025-01-03 DIAGNOSIS — L97.922 ULCERS OF BOTH LOWER EXTREMITIES WITH FAT LAYER EXPOSED: Primary | ICD-10-CM

## 2025-01-03 DIAGNOSIS — L97.912 ULCERS OF BOTH LOWER EXTREMITIES WITH FAT LAYER EXPOSED: Primary | ICD-10-CM

## 2025-01-03 LAB
GRAM STN SPEC: NORMAL

## 2025-01-03 PROCEDURE — 99999 PR PBB SHADOW E&M-EST. PATIENT-LVL V: CPT | Mod: PBBFAC,,, | Performed by: STUDENT IN AN ORGANIZED HEALTH CARE EDUCATION/TRAINING PROGRAM

## 2025-01-03 PROCEDURE — 87116 MYCOBACTERIA CULTURE: CPT | Mod: TXP | Performed by: STUDENT IN AN ORGANIZED HEALTH CARE EDUCATION/TRAINING PROGRAM

## 2025-01-03 PROCEDURE — 87205 SMEAR GRAM STAIN: CPT | Mod: TXP | Performed by: STUDENT IN AN ORGANIZED HEALTH CARE EDUCATION/TRAINING PROGRAM

## 2025-01-03 PROCEDURE — 87102 FUNGUS ISOLATION CULTURE: CPT | Mod: TXP | Performed by: STUDENT IN AN ORGANIZED HEALTH CARE EDUCATION/TRAINING PROGRAM

## 2025-01-03 PROCEDURE — 87075 CULTR BACTERIA EXCEPT BLOOD: CPT | Mod: TXP | Performed by: STUDENT IN AN ORGANIZED HEALTH CARE EDUCATION/TRAINING PROGRAM

## 2025-01-03 PROCEDURE — 87070 CULTURE OTHR SPECIMN AEROBIC: CPT | Mod: TXP | Performed by: STUDENT IN AN ORGANIZED HEALTH CARE EDUCATION/TRAINING PROGRAM

## 2025-01-03 PROCEDURE — 99215 OFFICE O/P EST HI 40 MIN: CPT | Mod: PBBFAC | Performed by: STUDENT IN AN ORGANIZED HEALTH CARE EDUCATION/TRAINING PROGRAM

## 2025-01-03 NOTE — PROGRESS NOTES
Subjective:     Patient    Yamel Shah is a 74 y.o. female.    Problem    2024: Previously seen by multiple wound care providers without significant improvement in wounds, has bounced around a lot. Has bilateral foot wounds related to scleroderma and immunosuppressant medications. Had improvement in granulation with regular wound care, oral and topical antibiotics, one debridement. Attempted topical vitamin E but caused burning.     01/03/25: Returns for bilateral foot wound care. No new concerns. Had pain flare on left foot, still sensitive.       History    History obtained from patient and review of medical records.     Past Medical History:   Diagnosis Date    Abnormal Pap smear     Acid reflux     Allergy     Arthritis     Encounter for blood transfusion     GERD (gastroesophageal reflux disease)     Hiatal hernia 03/24/2023    History of migraine headaches     Hx of colonic polyp     Hypertension     Idiopathic neuropathy 07/20/2012    ILD (interstitial lung disease) 11/06/2013    Iron deficiency anemia 03/18/2014    MRSA carrier     Osteopenia     Pneumonia     Pulmonary fibrosis     Pulmonary hypertension     Raynaud's disease     Scleroderma, diffuse     Sjogren's syndrome     Vitamin D deficiency 11/14/2013       Past Surgical History:   Procedure Laterality Date    24 HOUR IMPEDANCE PH MONITORING OF ESOPHAGUS IN PATIENT NOT TAKING ACID REDUCING MEDICATIONS N/A 03/04/2020    Procedure: IMPEDANCE PH STUDY, ESOPHAGEAL, 24 HOUR, IN PATIENT NOT TAKING ACID REDUCING MEDICATION;  Surgeon: Annamaria Mendoza MD;  [facility]  Service: Endoscopy;  Laterality: N/A;  OFF PPI/H2 Blocker   Motility Studies   Hold Narcotics x 1 days   Hold TCA x 1 days  2/26 - LVM attempting to confirm appt  2/27 - Confirmed appt    ANORECTAL MANOMETRY N/A 05/10/2021    Procedure: MANOMETRY, ANORECTAL with balloon expulsion test;  Surgeon: Annamaria Mendoza MD;  [facility]   Service: Endoscopy;  Laterality: N/A;  order combined  covid test 5/7 [facility], instructions emailed-KPvt    BREAST BIOPSY      Left, benign    BRONCHOSCOPY N/A 10/2/2023    Procedure: bronch;  Surgeon: Roberta Leigh DO;  Location: [facility] OR 2ND FLR;  Service: Endoscopy;  Laterality: N/A;    BRONCHOSCOPY N/A 9/16/2024    Procedure: Flexible bronchoscopy (BAL only);  Surgeon: Roberta Leigh DO;  Location: [facility] OR 2ND FLR;  Service: Endoscopy;  Laterality: N/A;    CERVICAL CONIZATION   W/ LASER  1970    COLONOSCOPY      COLONOSCOPY N/A 03/29/2019    Procedure: COLONOSCOPY;  Surgeon: Annamaria Mendoza MD;  Location: [facility] ENDO (2ND FLR);  Service: Endoscopy;  Laterality: N/A;    COLONOSCOPY N/A 05/10/2021    Procedure: COLONOSCOPY;  Surgeon: Annamaria Mendoza MD;  Location: [facility] ENDO (4TH FLR);  Service: Endoscopy;  Laterality: N/A;  2nd floor-previous scopes done on 2nd floor, gastroparesis  full liquid diet x2 days, clear liquid x1 day prior to procedure  covid test 5/7 [facility], instructions emailed-KPvt  5/6 pt confirmed appt-KPvt    DILATION AND CURETTAGE OF UTERUS      ESOPHAGEAL MANOMETRY WITH MEASUREMENT OF IMPEDANCE N/A 03/04/2020    Procedure: MANOMETRY, ESOPHAGUS, WITH IMPEDANCE MEASUREMENT;  Surgeon: Annamaria Mendoza MD;  Location: [facility] (4TH FLR);  Service: Endoscopy;  Laterality: N/A;  OFF PPI/H2 Blocker   Motility Studies   Hold Narcotics x 1 days   Hold TCA x 1 days    ESOPHAGOGASTRODUODENOSCOPY      ESOPHAGOGASTRODUODENOSCOPY N/A 03/29/2019    Procedure: EGD (ESOPHAGOGASTRODUODENOSCOPY);  Surgeon: Annamaria Mendoza MD;  Location: [facility] ENDO (2ND FLR);  Service: Endoscopy;  Laterality: N/A;  pulmonary htn    ESOPHAGOGASTRODUODENOSCOPY N/A 02/02/2021    Procedure: ESOPHAGOGASTRODUODENOSCOPY (EGD);  Surgeon: Annamaria Mendoza MD;  Location: [facility] ENDO (2ND FLR);  Service: Endoscopy;  Laterality: N/A;  2nd floor gastroparesis  3 days full liquid diet and 1 day clears  covid test 1/30 primary  care, instructions sent to [facility]    ESOPHAGOGASTRODUODENOSCOPY N/A 07/01/2022    Procedure: ESOPHAGOGASTRODUODENOSCOPY (EGD);  Surgeon: Annamaria Mendoza MD;  Location: [facility] (2ND FLR);  Service: Endoscopy;  Laterality: N/A;  2nd floor-gastroparesis  full liquid diet x3 days, clear liquid diet x1 day prior to procedure  fully vaccinated, instructions sent to [facility]  6/29-pt confirmed new arrival time-[facility]    EXCISION, LESION, STOMACH, LAPAROSCOPIC N/A 03/23/2023    Procedure: XI ROBOTIC EXCISION, LESION, STOMACH;  Surgeon: Katherine Segura MD;  Location: [facility] OR [address] [place] [address];  Service: General;  Laterality: N/A;    EXCISIONAL BIOPSY, LYMPH NODE Right 05/26/2023    Procedure: EXCISIONAL BIOPSY, LYMPH NODE, INGUINAL;  Surgeon: Katherine Segura MD;  Location: [facility] OR [address] [place] [address];  Service: General;  Laterality: Right;    HYSTERECTOMY  1990    FRANDY (AUB, Fibroids), ovaries remain    REPAIR, HERNIA, UMBILICAL N/A 03/23/2023    Procedure: REPAIR, HERNIA, UMBILICAL;  Surgeon: Katherine Segura MD;  Location: [address];  Service: General;  Laterality: N/A;    RIGHT HEART CATHETERIZATION Right 01/05/2021    Procedure: INSERTION, CATHETER, RIGHT HEART;  Surgeon: Aureliano Sy MD;  Location: [facility] CATH LAB;  Service: Cardiology;  Laterality: Right;    RIGHT HEART CATHETERIZATION Right 03/16/2023    Procedure: INSERTION, CATHETER, RIGHT HEART;  Surgeon: Aureliano Sy MD;  Location: [facility] CATH LAB;  Service: Cardiology;  Laterality: Right;    RIGHT HEART CATHETERIZATION Right 11/22/2024    Procedure: INSERTION, CATHETER, RIGHT HEART;  Surgeon: Tamanna Martins MD;  Location: [facility] CATH LAB;  Service: Cardiology;  Laterality: Right;    ROBOT-ASSISTED LAPAROSCOPIC REPAIR OF INGUINAL HERNIA USING DA VERNELL XI Right 05/26/2023    Procedure: XI ROBOTIC REPAIR, HERNIA, INGUINAL, FEMORAL;  Surgeon: Katherine Segura MD;  Location: [address];  Service: General;   Laterality: Right;    ROBOT-ASSISTED REPAIR OF HIATAL HERNIA USING DA VERNELL XI N/A 2023    Procedure: XI ROBOTIC REPAIR, HERNIA, HIATAL;  Surgeon: Katherine Segura MD;  Location: [facility] OR [address] [place] [address];  Service: General;  Laterality: N/A;    VARICOSE VEIN SURGERY      XI ROBOTIC GASTROPEXY N/A 2023    Procedure: XI ROBOTIC GASTROPEXY;  Surgeon: Katherine Segura MD;  [facility];  Service: General;  Laterality: N/A;    XI ROBOTIC PYLOROMYOTOMY N/A 2023    Procedure: XI ROBOTIC PYLOROMYOTOMY;  Surgeon: Katherine Segura MD;  Location: [facility] OR [address] [place] [address];  Service: General;  Laterality: N/A;        Objective:     Vitals  Wt Readings from Last 1 Encounters:   24 62.8 kg (138 lb 7.2 oz)     Temp Readings from Last 1 Encounters:   24 98.4 °F (36.9 °C) (Temporal)     BP Readings from Last 1 Encounters:   25 (!) 101/58     Pulse Readings from Last 1 Encounters:   25 70       Dermatological Exam    Skin:  Skin of both feet thickened (scleroderma), stiff, with extensive ulcerations of both feet down to fat with slowly improving granulation; xerosis            Nails:  10 nail(s) thickened, and 10 nail(s) discolored    Vascular Exam    Arteries:  Posterior tibial artery palpable on right  Dorsalis pedis artery palpable on right  Posterior tibial artery palpable on left  Dorsalis pedis artery palpable on left    Veins:  Superficial veins unremarkable on right  Superficial veins unremarkable on left    Swellin+ nonpitting on right  1+ nonpitting on left    Neurological Exam    [place] touch test:  6/6 sites sensed, light touch intact     Musculoskeletal Exam    Footwear:  Casual on right  Casual on left    Gait Exam:   Ambulatory Status: Ambulatory  Gait: Normal  Assistive Devices: None    Foot Progression Angle:  Normal on right  Normal on left    Right Lower Extremity Additional Findings:  Right foot and ankle function, strength, and range of motion  unremarkable except as noted above.     Left Lower Extremity Additional Findings:  Left foot and ankle function, strength, and range of motion unremarkable except as noted above.    Imaging and Other Tests    Imaging:  Independently reviewed and interpreted imaging, findings are as follows: N/A     Assessment:     Encounter Diagnoses   Name Primary?    Ulcers of both lower extremities with fat layer exposed Yes    Cellulitis of lower extremity, unspecified laterality             Plan:     I counseled the patient on her conditions, their implications and medical management.    Neuropathy: chronic stable  -Continue duloxetine 60 mg/day.   -Continue amitriptyline 10 mg/night.     Bilateral ulcers, cellulitis, swelling: chronic stable   -Dressings changed. Leave intact as long as possible then continue usual dressing changes.   -Protected WB in surgical shoes.    -Recultured.     Scleroderma: chronic stable  -Continue PO tadalafil 40 mg/day.      I spent a total of 40 minutes on the day of the visit.  This includes face to face time and non-face to face time preparing to see the patient (eg, review of tests), obtaining and/or reviewing separately obtained history, documenting clinical information in the electronic or other health record, independently interpreting results and communicating results to the patient/family/caregiver, or care coordinator.     Return to clinic in 1-2 weeks, call sooner PRN.

## 2025-01-04 LAB — MYCOBACTERIUM SPEC QL CULT: NORMAL

## 2025-01-07 ENCOUNTER — PATIENT MESSAGE (OUTPATIENT)
Dept: PODIATRY | Facility: CLINIC | Age: 75
End: 2025-01-07
Payer: MEDICARE

## 2025-01-07 DIAGNOSIS — L97.912 ULCERS OF BOTH LOWER EXTREMITIES WITH FAT LAYER EXPOSED: Primary | ICD-10-CM

## 2025-01-07 DIAGNOSIS — L97.922 ULCERS OF BOTH LOWER EXTREMITIES WITH FAT LAYER EXPOSED: Primary | ICD-10-CM

## 2025-01-07 DIAGNOSIS — L03.116 CELLULITIS OF LEFT LOWER EXTREMITY: ICD-10-CM

## 2025-01-08 ENCOUNTER — OFFICE VISIT (OUTPATIENT)
Dept: PODIATRY | Facility: CLINIC | Age: 75
End: 2025-01-08
Payer: MEDICARE

## 2025-01-08 VITALS
DIASTOLIC BLOOD PRESSURE: 60 MMHG | BODY MASS INDEX: 23.04 KG/M2 | SYSTOLIC BLOOD PRESSURE: 155 MMHG | HEIGHT: 65 IN | HEART RATE: 92 BPM

## 2025-01-08 DIAGNOSIS — L97.922 ULCERS OF BOTH LOWER EXTREMITIES WITH FAT LAYER EXPOSED: Primary | ICD-10-CM

## 2025-01-08 DIAGNOSIS — L97.912 ULCERS OF BOTH LOWER EXTREMITIES WITH FAT LAYER EXPOSED: Primary | ICD-10-CM

## 2025-01-08 DIAGNOSIS — L03.116 CELLULITIS OF LEFT LOWER EXTREMITY: ICD-10-CM

## 2025-01-08 PROCEDURE — 99214 OFFICE O/P EST MOD 30 MIN: CPT | Mod: PBBFAC | Performed by: STUDENT IN AN ORGANIZED HEALTH CARE EDUCATION/TRAINING PROGRAM

## 2025-01-08 PROCEDURE — 99999 PR PBB SHADOW E&M-EST. PATIENT-LVL IV: CPT | Mod: PBBFAC,,, | Performed by: STUDENT IN AN ORGANIZED HEALTH CARE EDUCATION/TRAINING PROGRAM

## 2025-01-08 NOTE — PROGRESS NOTES
Subjective:     Patient    Yamel Shah is a 74 y.o. female.    Problem    2024: Previously seen by multiple wound care providers without significant improvement in wounds, has bounced around a lot. Has bilateral foot wounds related to scleroderma and immunosuppressant medications. Had improvement in granulation with regular wound care, oral and topical antibiotics, one debridement. Attempted topical vitamin E but caused burning.     01/03/25: Returns for bilateral foot wound care. No new concerns. Had pain flare on left foot, still sensitive.     01/08/25: Returns for bilateral foot wound care. No new concerns.       History    History obtained from patient and review of medical records.     Past Medical History:   Diagnosis Date    Abnormal Pap smear     Acid reflux     Allergy     Arthritis     Encounter for blood transfusion     GERD (gastroesophageal reflux disease)     Hiatal hernia 03/24/2023    History of migraine headaches     Hx of colonic polyp     Hypertension     Idiopathic neuropathy 07/20/2012    ILD (interstitial lung disease) 11/06/2013    Iron deficiency anemia 03/18/2014    MRSA carrier     Osteopenia     Pneumonia     Pulmonary fibrosis     Pulmonary hypertension     Raynaud's disease     Scleroderma, diffuse     Sjogren's syndrome     Vitamin D deficiency 11/14/2013       Past Surgical History:   Procedure Laterality Date    24 HOUR IMPEDANCE PH MONITORING OF ESOPHAGUS IN PATIENT NOT TAKING ACID REDUCING MEDICATIONS N/A 03/04/2020    Procedure: IMPEDANCE PH STUDY, ESOPHAGEAL, 24 HOUR, IN PATIENT NOT TAKING ACID REDUCING MEDICATION;  Surgeon: Annamaria Mendoza MD;  Location: [facility] ([address] [place] [address]);  Service: Endoscopy;  Laterality: N/A;  OFF PPI/H2 Blocker   Motility Studies   Hold Narcotics x 1 days   Hold TCA x 1 days  2/26 - LVM attempting to confirm appt  2/27 - Confirmed appt    ANORECTAL MANOMETRY N/A 05/10/2021    Procedure: MANOMETRY, ANORECTAL with balloon expulsion test;   Surgeon: Annamaria Mendoza MD;  Location: [facility] (4TH FLR);  Service: Endoscopy;  Laterality: N/A;  order combined  covid test 5/7 [employer], instructions emailed-KP[facility]    BREAST BIOPSY      Left, benign    BRONCHOSCOPY N/A 10/2/2023    Procedure: bronch;  Surgeon: Roberta Leigh DO;  Location: [facility] OR 2ND FLR;  Service: Endoscopy;  Laterality: N/A;    BRONCHOSCOPY N/A 9/16/2024    Procedure: Flexible bronchoscopy (BAL only);  Surgeon: Roberta Leigh DO;  Location: [facility] OR 2ND FLR;  Service: Endoscopy;  Laterality: N/A;    CERVICAL CONIZATION   W/ LASER  1970    COLONOSCOPY      COLONOSCOPY N/A 03/29/2019    Procedure: COLONOSCOPY;  Surgeon: Annamaria Mendoza MD;  Location: [facility] (2ND FLR);  Service: Endoscopy;  Laterality: N/A;    COLONOSCOPY N/A 05/10/2021    Procedure: COLONOSCOPY;  Surgeon: Annamaria Mendoza MD;  Location: [facility] (4TH FLR);  Service: Endoscopy;  Laterality: N/A;  2nd floor-previous scopes done on 2nd floor, gastroparesis  full liquid diet x2 days, clear liquid x1 day prior to procedure  covid test 5/7 [employer], instructions emailed-\[facility]\""  5/6 pt confirmed appt-\[facility]\""    DILATION AND CURETTAGE OF UTERUS      ESOPHAGEAL MANOMETRY WITH MEASUREMENT OF IMPEDANCE N/A 03/04/2020    Procedure: MANOMETRY, ESOPHAGUS, WITH IMPEDANCE MEASUREMENT;  Surgeon: Annamaria Mendoza MD;  Location: [facility] (4TH FLR);  Service: Endoscopy;  Laterality: N/A;  OFF PPI/H2 Blocker   Motility Studies   Hold Narcotics x 1 days   Hold TCA x 1 days    ESOPHAGOGASTRODUODENOSCOPY      ESOPHAGOGASTRODUODENOSCOPY N/A 03/29/2019    Procedure: EGD (ESOPHAGOGASTRODUODENOSCOPY);  Surgeon: Annamaria Mendoza MD;  Location: [facility] (2ND FLR);  Service: Endoscopy;  Laterality: N/A;  pulmonary htn    ESOPHAGOGASTRODUODENOSCOPY N/A 02/02/2021    Procedure: ESOPHAGOGASTRODUODENOSCOPY (EGD);  Surgeon: Annamaria Mendoza MD;  [facility]: [facility] (2ND FLR);  Service: Endoscopy;  Laterality: N/A;  2nd floor gastroparesis  3 days  full liquid diet and 1 day clears  covid test 1/30 primary care, instructions sent to [facility]    ESOPHAGOGASTRODUODENOSCOPY N/A 07/01/2022    Procedure: ESOPHAGOGASTRODUODENOSCOPY (EGD);  Surgeon: Annamaria Mendoza MD;  Location: [facility] (2ND FLR);  Service: Endoscopy;  Laterality: N/A;  2nd floor-gastroparesis  full liquid diet x3 days, clear liquid diet x1 day prior to procedure  fully vaccinated, instructions sent to [facility]  6/29-pt confirmed new arrival time-[facility] [place] [facility]    EXCISION, LESION, STOMACH, LAPAROSCOPIC N/A 03/23/2023    Procedure: XI ROBOTIC EXCISION, LESION, STOMACH;  Surgeon: Katherine Segura MD;  [facility];  Service: General;  Laterality: N/A;    EXCISIONAL BIOPSY, LYMPH NODE Right 05/26/2023    Procedure: EXCISIONAL BIOPSY, LYMPH NODE, INGUINAL;  Surgeon: Katherine Segura MD;  [address]  Service: General;  Laterality: Right;    HYSTERECTOMY  1990    FRANDY (AUB, Fibroids), ovaries remain    REPAIR, HERNIA, UMBILICAL N/A 03/23/2023    Procedure: REPAIR, HERNIA, UMBILICAL;  Surgeon: Katherine Segura MD;  [facility];  Service: General;  Laterality: N/A;    RIGHT HEART CATHETERIZATION Right 01/05/2021    Procedure: INSERTION, CATHETER, RIGHT HEART;  Surgeon: Aureliano Sy MD;  Location: [facility] CATH LAB;  Service: Cardiology;  Laterality: Right;    RIGHT HEART CATHETERIZATION Right 03/16/2023    Procedure: INSERTION, CATHETER, RIGHT HEART;  Surgeon: Aureliano Sy MD;  Location: [facility] CATH LAB;  Service: Cardiology;  Laterality: Right;    RIGHT HEART CATHETERIZATION Right 11/22/2024    Procedure: INSERTION, CATHETER, RIGHT HEART;  Surgeon: Tamanna Martins MD;  Location: [facility] CATH LAB;  Service: Cardiology;  Laterality: Right;    ROBOT-ASSISTED LAPAROSCOPIC REPAIR OF INGUINAL HERNIA USING DA VERNELL XI Right 05/26/2023    Procedure: XI ROBOTIC REPAIR, HERNIA, INGUINAL, FEMORAL;  Surgeon: Katherine Segura,  MD;  [facility];  Service: General;  Laterality: Right;    ROBOT-ASSISTED REPAIR OF HIATAL HERNIA USING DA VERNELL XI N/A 2023    Procedure: XI ROBOTIC REPAIR, HERNIA, HIATAL;  Surgeon: Katherine Segura MD;  [facility];  Service: General;  Laterality: N/A;    VARICOSE VEIN SURGERY      XI ROBOTIC GASTROPEXY N/A 2023    Procedure: XI ROBOTIC GASTROPEXY;  Surgeon: Katherine Segura MD;  [facility];  Service: General;  Laterality: N/A;    XI ROBOTIC PYLOROMYOTOMY N/A 2023    Procedure: XI ROBOTIC PYLOROMYOTOMY;  Surgeon: Katherine Segura MD;  [facility];  Service: General;  Laterality: N/A;        Objective:     Vitals  Wt Readings from Last 1 Encounters:   24 62.8 kg (138 lb 7.2 oz)     Temp Readings from Last 1 Encounters:   24 98.4 °F (36.9 °C) (Temporal)     BP Readings from Last 1 Encounters:   25 (!) 155/60     Pulse Readings from Last 1 Encounters:   25 92       Dermatological Exam    Skin:  Skin of both feet thickened (scleroderma), stiff, with extensive ulcerations of both feet down to fat with slowly improving granulation; xerosis            Nails:  10 nail(s) thickened, and 10 nail(s) discolored    Vascular Exam    Arteries:  Posterior tibial artery palpable on right  Dorsalis pedis artery palpable on right  Posterior tibial artery palpable on left  Dorsalis pedis artery palpable on left    Veins:  Superficial veins unremarkable on right  Superficial veins unremarkable on left    Swellin+ nonpitting on right  1+ nonpitting on left    Neurological Exam    [place] touch test:  6/6 sites sensed, light touch intact     Musculoskeletal Exam    Footwear:  Casual on right  Casual on left    Gait Exam:   Ambulatory Status: Ambulatory  Gait: Normal  Assistive Devices: None    Foot Progression Angle:  Normal on right  Normal on left    Right Lower Extremity Additional Findings:  Right foot  and ankle function, strength, and range of motion unremarkable except as noted above.     Left Lower Extremity Additional Findings:  Left foot and ankle function, strength, and range of motion unremarkable except as noted above.    Imaging and Other Tests    Imaging:  Independently reviewed and interpreted imaging, findings are as follows: N/A     Assessment:     Encounter Diagnoses   Name Primary?    Ulcers of both lower extremities with fat layer exposed Yes    Cellulitis of left lower extremity           Plan:     I counseled the patient on her conditions, their implications and medical management.    Neuropathy: chronic stable  -Continue duloxetine 60 mg/day.   -Continue amitriptyline 10 mg/night.     Bilateral ulcers, cellulitis, swelling: chronic stable   -Dressings changed. Leave intact as long as possible then continue usual dressing changes.   -Protected WB in surgical shoes.    -Referred to ID based on recent cultures, discussed with patient, recommend aggressive infection control.     Scleroderma: chronic stable  -Continue PO tadalafil 40 mg/day.      I spent a total of 40 minutes on the day of the visit.  This includes face to face time and non-face to face time preparing to see the patient (eg, review of tests), obtaining and/or reviewing separately obtained history, documenting clinical information in the electronic or other health record, independently interpreting results and communicating results to the patient/family/caregiver, or care coordinator.     Return to clinic in 1-2 weeks, call sooner PRN.

## 2025-01-16 ENCOUNTER — OFFICE VISIT (OUTPATIENT)
Dept: PODIATRY | Facility: CLINIC | Age: 75
End: 2025-01-16
Payer: MEDICARE

## 2025-01-16 VITALS
SYSTOLIC BLOOD PRESSURE: 142 MMHG | HEART RATE: 97 BPM | BODY MASS INDEX: 23.04 KG/M2 | DIASTOLIC BLOOD PRESSURE: 67 MMHG | HEIGHT: 65 IN

## 2025-01-16 DIAGNOSIS — L97.912 ULCERS OF BOTH LOWER EXTREMITIES WITH FAT LAYER EXPOSED: Primary | ICD-10-CM

## 2025-01-16 DIAGNOSIS — L97.922 ULCERS OF BOTH LOWER EXTREMITIES WITH FAT LAYER EXPOSED: Primary | ICD-10-CM

## 2025-01-16 DIAGNOSIS — L03.116 CELLULITIS OF LEFT LOWER EXTREMITY: ICD-10-CM

## 2025-01-16 PROCEDURE — 99999 PR PBB SHADOW E&M-EST. PATIENT-LVL IV: CPT | Mod: PBBFAC,,, | Performed by: STUDENT IN AN ORGANIZED HEALTH CARE EDUCATION/TRAINING PROGRAM

## 2025-01-16 PROCEDURE — 99214 OFFICE O/P EST MOD 30 MIN: CPT | Mod: PBBFAC | Performed by: STUDENT IN AN ORGANIZED HEALTH CARE EDUCATION/TRAINING PROGRAM

## 2025-01-16 PROCEDURE — 99215 OFFICE O/P EST HI 40 MIN: CPT | Mod: S$PBB,,, | Performed by: STUDENT IN AN ORGANIZED HEALTH CARE EDUCATION/TRAINING PROGRAM

## 2025-01-16 NOTE — PROGRESS NOTES
Subjective:     Patient    Yamel Shah is a 74 y.o. female.    Problem    2024: Previously seen by multiple wound care providers without significant improvement in wounds, has bounced around a lot. Has bilateral foot wounds related to scleroderma and immunosuppressant medications. Had improvement in granulation with regular wound care, oral and topical antibiotics, one debridement. Attempted topical vitamin E but caused burning.     01/03/25: Returns for bilateral foot wound care. No new concerns. Had pain flare on left foot, still sensitive.     01/08/25: Returns for bilateral foot wound care. No new concerns.     01/16/25: Returns for bilateral foot wound care. Awaiting appointment with ID 02/[id number]. No new concerns.       History    History obtained from patient and review of medical records.     Past Medical History:   Diagnosis Date    Abnormal Pap smear     Acid reflux     Allergy     Arthritis     Encounter for blood transfusion     GERD (gastroesophageal reflux disease)     Hiatal hernia 03/24/2023    History of migraine headaches     Hx of colonic polyp     Hypertension     Idiopathic neuropathy 07/20/2012    ILD (interstitial lung disease) 11/06/2013    Iron deficiency anemia 03/18/2014    MRSA carrier     Osteopenia     Pneumonia     Pulmonary fibrosis     Pulmonary hypertension     Raynaud's disease     Scleroderma, diffuse     Sjogren's syndrome     Vitamin D deficiency 11/14/2013       Past Surgical History:   Procedure Laterality Date    24 HOUR IMPEDANCE PH MONITORING OF ESOPHAGUS IN PATIENT NOT TAKING ACID REDUCING MEDICATIONS N/A 03/04/2020    Procedure: IMPEDANCE PH STUDY, ESOPHAGEAL, 24 HOUR, IN PATIENT NOT TAKING ACID REDUCING MEDICATION;  Surgeon: Annamaria Mendoza MD;  Location: [facility] ([address] [place] [address]);  Service: Endoscopy;  Laterality: N/A;  OFF PPI/H2 Blocker   Motility Studies   Hold Narcotics x 1 days   Hold TCA x 1 days  2/26 - LVM attempting to confirm appt  2/27 - Confirmed  appt    ANORECTAL MANOMETRY N/A 05/10/2021    Procedure: MANOMETRY, ANORECTAL with balloon expulsion test;  Surgeon: Annamaria Mendoza MD;  Location: [facility] ENDO (4TH FLR);  Service: Endoscopy;  Laterality: N/A;  order combined  covid test 5/7 [employer], instructions emailed-KPvt    BREAST BIOPSY      Left, benign    BRONCHOSCOPY N/A 10/2/2023    Procedure: bronch;  Surgeon: Roberta Leigh DO;  Location: [facility] OR 2ND FLR;  Service: Endoscopy;  Laterality: N/A;    BRONCHOSCOPY N/A 9/16/2024    Procedure: Flexible bronchoscopy (BAL only);  Surgeon: oRberta Leigh DO;  Location: [facility] OR 2ND FLR;  Service: Endoscopy;  Laterality: N/A;    CERVICAL CONIZATION   W/ LASER  1970    COLONOSCOPY      COLONOSCOPY N/A 03/29/2019    Procedure: COLONOSCOPY;  Surgeon: Annamaria Mendoza MD;  Location: [facility] (2ND FLR);  Service: Endoscopy;  Laterality: N/A;    COLONOSCOPY N/A 05/10/2021    Procedure: COLONOSCOPY;  Surgeon: Annamaria Mendoza MD;  Location: [facility] (4TH FLR);  Service: Endoscopy;  Laterality: N/A;  2nd floor-previous scopes done on 2nd floor, gastroparesis  full liquid diet x2 days, clear liquid x1 day prior to procedure  covid test 5/7 [employer], instructions emailed-KPvt  5/6 pt confirmed appt-[facility]    DILATION AND CURETTAGE OF UTERUS      ESOPHAGEAL MANOMETRY WITH MEASUREMENT OF IMPEDANCE N/A 03/04/2020    Procedure: MANOMETRY, ESOPHAGUS, WITH IMPEDANCE MEASUREMENT;  Surgeon: Annamaria Mendoza MD;  Location: [facility] (4TH FLR);  Service: Endoscopy;  Laterality: N/A;  OFF PPI/H2 Blocker   Motility Studies   Hold Narcotics x 1 days   Hold TCA x 1 days    ESOPHAGOGASTRODUODENOSCOPY      ESOPHAGOGASTRODUODENOSCOPY N/A 03/29/2019    Procedure: EGD (ESOPHAGOGASTRODUODENOSCOPY);  Surgeon: Annamaria Mendoza MD;  Location: [facility] ENDO (2ND FLR);  Service: Endoscopy;  Laterality: N/A;  pulmonary htn    ESOPHAGOGASTRODUODENOSCOPY N/A 02/02/2021    Procedure: ESOPHAGOGASTRODUODENOSCOPY (EGD);  Surgeon: Annamaria Mendoza,  MD;  Location: [facility] ENDO ([address] [place] [address]);  Service: Endoscopy;  Laterality: N/A;  2nd floor gastroparesis  3 days full liquid diet and 1 day clears  covid test 1/30 primary care, instructions sent to [facility]    ESOPHAGOGASTRODUODENOSCOPY N/A 07/01/2022    Procedure: ESOPHAGOGASTRODUODENOSCOPY (EGD);  Surgeon: Annamaria Mendoza MD;  [facility]  Service: Endoscopy;  Laterality: N/A;  2nd floor-gastroparesis  full liquid diet x3 days, clear liquid diet x1 day prior to procedure  fully vaccinated, instructions sent to [facility]  6/29-pt confirmed new arrival time-[facility]    EXCISION, LESION, STOMACH, LAPAROSCOPIC N/A 03/23/2023    Procedure: XI ROBOTIC EXCISION, LESION, STOMACH;  Surgeon: Katherine Segura MD;  Location: [facility] OR [address] [place] [address];  Service: General;  Laterality: N/A;    EXCISIONAL BIOPSY, LYMPH NODE Right 05/26/2023    Procedure: EXCISIONAL BIOPSY, LYMPH NODE, INGUINAL;  Surgeon: Katherine Segura MD;  Location: [facility] OR [address] [place] [address];  Service: General;  Laterality: Right;    HYSTERECTOMY  1990    FRANDY (AUB, Fibroids), ovaries remain    REPAIR, HERNIA, UMBILICAL N/A 03/23/2023    Procedure: REPAIR, HERNIA, UMBILICAL;  Surgeon: Katherine Segura MD;  Location: [facility] OR [address] [place] [address];  Service: General;  Laterality: N/A;    RIGHT HEART CATHETERIZATION Right 01/05/2021    Procedure: INSERTION, CATHETER, RIGHT HEART;  Surgeon: Aureliano Sy MD;  Location: [facility] CATH LAB;  Service: Cardiology;  Laterality: Right;    RIGHT HEART CATHETERIZATION Right 03/16/2023    Procedure: INSERTION, CATHETER, RIGHT HEART;  Surgeon: Aureliano Sy MD;  Location: [facility] CATH LAB;  Service: Cardiology;  Laterality: Right;    RIGHT HEART CATHETERIZATION Right 11/22/2024    Procedure: INSERTION, CATHETER, RIGHT HEART;  Surgeon: Tamanna Martins MD;  Location: [facility] CATH LAB;  Service: Cardiology;  Laterality: Right;    ROBOT-ASSISTED LAPAROSCOPIC REPAIR OF INGUINAL HERNIA USING DA VERNELL XI  Right 2023    Procedure: XI ROBOTIC REPAIR, HERNIA, INGUINAL, FEMORAL;  Surgeon: Katherine Segura MD;  Location: [facility] OR 2ND FLR;  Service: General;  Laterality: Right;    ROBOT-ASSISTED REPAIR OF HIATAL HERNIA USING DA VERNELL XI N/A 2023    Procedure: XI ROBOTIC REPAIR, HERNIA, HIATAL;  Surgeon: Katherine Segura MD;  Location: [facility] OR 2ND FLR;  Service: General;  Laterality: N/A;    VARICOSE VEIN SURGERY      XI ROBOTIC GASTROPEXY N/A 2023    Procedure: XI ROBOTIC GASTROPEXY;  Surgeon: Katherine Segura MD;  Location: [facility] OR 2ND FLR;  Service: General;  Laterality: N/A;    XI ROBOTIC PYLOROMYOTOMY N/A 2023    Procedure: XI ROBOTIC PYLOROMYOTOMY;  Surgeon: Katherine Segura MD;  Location: [facility] OR 2ND FLR;  Service: General;  Laterality: N/A;        Objective:     Vitals  Wt Readings from Last 1 Encounters:   24 62.8 kg (138 lb 7.2 oz)     Temp Readings from Last 1 Encounters:   24 98.4 °F (36.9 °C) (Temporal)     BP Readings from Last 1 Encounters:   25 (!) 142/67     Pulse Readings from Last 1 Encounters:   25 97       Dermatological Exam    Skin:  Skin of both feet thickened (scleroderma), stiff, with extensive ulcerations of both feet down to fat with slowly improving granulation; xerosis       Nails:  10 nail(s) thickened, and 10 nail(s) discolored    Vascular Exam    Arteries:  Posterior tibial artery palpable on right  Dorsalis pedis artery palpable on right  Posterior tibial artery palpable on left  Dorsalis pedis artery palpable on left    Veins:  Superficial veins unremarkable on right  Superficial veins unremarkable on left    Swellin+ nonpitting on right  1+ nonpitting on left    Neurological Exam    [place] touch test:  6/6 sites sensed, light touch intact     Musculoskeletal Exam    Footwear:  Casual on right  Casual on left    Gait Exam:   Ambulatory Status: Ambulatory  Gait: Normal  Assistive Devices: None    Foot  Progression Angle:  Normal on right  Normal on left    Right Lower Extremity Additional Findings:  Right foot and ankle function, strength, and range of motion unremarkable except as noted above.     Left Lower Extremity Additional Findings:  Left foot and ankle function, strength, and range of motion unremarkable except as noted above.    Imaging and Other Tests    Imaging:  Independently reviewed and interpreted imaging, findings are as follows: N/A     Assessment:     Encounter Diagnoses   Name Primary?    Ulcers of both lower extremities with fat layer exposed Yes    Cellulitis of left lower extremity             Plan:     I counseled the patient on her conditions, their implications and medical management.    Neuropathy: chronic stable  -Continue duloxetine 60 mg/day.   -Continue amitriptyline 10 mg/night.     Bilateral ulcers, cellulitis, swelling: chronic stable   -Dressings changed. Leave intact as long as possible then continue usual dressing changes.   -Protected WB in surgical shoes.    -Establish care with ID as scheduled.     Scleroderma: chronic stable  -Continue PO tadalafil 40 mg/day.      I spent a total of 40 minutes on the day of the visit.  This includes face to face time and non-face to face time preparing to see the patient (eg, review of tests), obtaining and/or reviewing separately obtained history, documenting clinical information in the electronic or other health record, independently interpreting results and communicating results to the patient/family/caregiver, or care coordinator.     Return to clinic in 1-2 weeks, call sooner PRN.

## 2025-01-24 ENCOUNTER — TELEPHONE (OUTPATIENT)
Dept: TRANSPLANT | Facility: CLINIC | Age: 75
End: 2025-01-24
Payer: MEDICARE

## 2025-01-24 ENCOUNTER — OFFICE VISIT (OUTPATIENT)
Dept: PODIATRY | Facility: CLINIC | Age: 75
End: 2025-01-24
Payer: MEDICARE

## 2025-01-24 VITALS
BODY MASS INDEX: 23.04 KG/M2 | DIASTOLIC BLOOD PRESSURE: 75 MMHG | HEIGHT: 65 IN | SYSTOLIC BLOOD PRESSURE: 133 MMHG | HEART RATE: 88 BPM

## 2025-01-24 DIAGNOSIS — L97.922 ULCERS OF BOTH LOWER EXTREMITIES WITH FAT LAYER EXPOSED: Primary | ICD-10-CM

## 2025-01-24 DIAGNOSIS — L03.116 CELLULITIS OF LEFT LOWER EXTREMITY: ICD-10-CM

## 2025-01-24 DIAGNOSIS — M34.9 SCLERODERMA: ICD-10-CM

## 2025-01-24 DIAGNOSIS — L97.912 ULCERS OF BOTH LOWER EXTREMITIES WITH FAT LAYER EXPOSED: Primary | ICD-10-CM

## 2025-01-24 PROCEDURE — 99214 OFFICE O/P EST MOD 30 MIN: CPT | Mod: PBBFAC | Performed by: STUDENT IN AN ORGANIZED HEALTH CARE EDUCATION/TRAINING PROGRAM

## 2025-01-24 PROCEDURE — 99214 OFFICE O/P EST MOD 30 MIN: CPT | Mod: S$PBB,,, | Performed by: STUDENT IN AN ORGANIZED HEALTH CARE EDUCATION/TRAINING PROGRAM

## 2025-01-24 PROCEDURE — 99999 PR PBB SHADOW E&M-EST. PATIENT-LVL IV: CPT | Mod: PBBFAC,,, | Performed by: STUDENT IN AN ORGANIZED HEALTH CARE EDUCATION/TRAINING PROGRAM

## 2025-01-24 RX ORDER — ACETAMINOPHEN AND CODEINE PHOSPHATE 300; 60 MG/1; MG/1
1 TABLET ORAL NIGHTLY
Qty: 30 TABLET | Refills: 0 | Status: SHIPPED | OUTPATIENT
Start: 2025-01-24 | End: 2025-02-23

## 2025-01-24 NOTE — TELEPHONE ENCOUNTER
"MA scheduled request per provider notes, on day of next appointment, three months from previous (February). Voicemail left for patient.  ----- Message from Abad sent at 1/24/2025  1:30 PM CST -----  Scheduling Request    Name Of Caller: Self    Contact Preference?: [phone]    What is the nature of the call?: Calling to speak w/ Johanna about scheduling a CT Scan      Additional Notes:  "Thank you for all that you do for our patients"  "

## 2025-01-24 NOTE — PROGRESS NOTES
Subjective:     Patient    Yamel Shah is a 74 y.o. female.    Problem    2024: Previously seen by multiple wound care providers without significant improvement in wounds, has bounced around a lot. Has bilateral foot wounds related to scleroderma and immunosuppressant medications. Had improvement in granulation with regular wound care, oral and topical antibiotics, one debridement. Attempted topical vitamin E but caused burning.     01/03/25: Returns for bilateral foot wound care. No new concerns. Had pain flare on left foot, still sensitive.     01/08/25: Returns for bilateral foot wound care. No new concerns.     01/16/25: Returns for bilateral foot wound care. Awaiting appointment with ID 02/14. No new concerns.     01/24/25: Returns for bilateral foot wound care. Pain controlled on current medications. No new concerns.       History    History obtained from patient and review of medical records.     Past Medical History:   Diagnosis Date    Abnormal Pap smear     Acid reflux     Allergy     Arthritis     Encounter for blood transfusion     GERD (gastroesophageal reflux disease)     Hiatal hernia 03/24/2023    History of migraine headaches     Hx of colonic polyp     Hypertension     Idiopathic neuropathy 07/20/2012    ILD (interstitial lung disease) 11/06/2013    Iron deficiency anemia 03/18/2014    MRSA carrier     Osteopenia     Pneumonia     Pulmonary fibrosis     Pulmonary hypertension     Raynaud's disease     Scleroderma, diffuse     Sjogren's syndrome     Vitamin D deficiency 11/14/2013       Past Surgical History:   Procedure Laterality Date    24 HOUR IMPEDANCE PH MONITORING OF ESOPHAGUS IN PATIENT NOT TAKING ACID REDUCING MEDICATIONS N/A 03/04/2020    Procedure: IMPEDANCE PH STUDY, ESOPHAGEAL, 24 HOUR, IN PATIENT NOT TAKING ACID REDUCING MEDICATION;  Surgeon: Annamaria Mendoza MD;  Location: [facility] ([address] [place] [address]);  Service: Endoscopy;  Laterality: N/A;  OFF PPI/H2 Blocker   Motility  Studies   Hold Narcotics x 1 days   Hold TCA x 1 days  2/26 - LVM attempting to confirm appt  2/27 - Confirmed appt    ANORECTAL MANOMETRY N/A 05/10/2021    Procedure: MANOMETRY, ANORECTAL with balloon expulsion test;  Surgeon: Annamaria Mendoza MD;  Location: [facility] ENDO (4TH FLR);  Service: Endoscopy;  Laterality: N/A;  order combined  covid test 5/7 Denominational, instructions emailed-KPvt    BREAST BIOPSY      Left, benign    BRONCHOSCOPY N/A 10/2/2023    Procedure: bronch;  Surgeon: Roberta Leigh DO;  Location: [facility] OR 2ND FLR;  Service: Endoscopy;  Laterality: N/A;    BRONCHOSCOPY N/A 9/16/2024    Procedure: Flexible bronchoscopy (BAL only);  Surgeon: Roberta Leigh DO;  Location: [facility] OR 2ND FLR;  Service: Endoscopy;  Laterality: N/A;    CERVICAL CONIZATION   W/ LASER  1970    COLONOSCOPY      COLONOSCOPY N/A 03/29/2019    Procedure: COLONOSCOPY;  Surgeon: Annamaria Mendoza MD;  Location: [facility] AUGUSTIN (2ND FLR);  Service: Endoscopy;  Laterality: N/A;    COLONOSCOPY N/A 05/10/2021    Procedure: COLONOSCOPY;  Surgeon: Annamaria Mendoza MD;  Location: [facility] ENDO (4TH FLR);  Service: Endoscopy;  Laterality: N/A;  2nd floor-previous scopes done on 2nd floor, gastroparesis  full liquid diet x2 days, clear liquid x1 day prior to procedure  covid test 5/7 Denominational, instructions emailed-KPvt  5/6 pt confirmed appt-KPvt    DILATION AND CURETTAGE OF UTERUS      ESOPHAGEAL MANOMETRY WITH MEASUREMENT OF IMPEDANCE N/A 03/04/2020    Procedure: MANOMETRY, ESOPHAGUS, WITH IMPEDANCE MEASUREMENT;  Surgeon: Annamaria Mendoza MD;  Location: [facility] AUGUSTIN (4TH FLR);  Service: Endoscopy;  Laterality: N/A;  OFF PPI/H2 Blocker   Motility Studies   Hold Narcotics x 1 days   Hold TCA x 1 days    ESOPHAGOGASTRODUODENOSCOPY      ESOPHAGOGASTRODUODENOSCOPY N/A 03/29/2019    Procedure: EGD (ESOPHAGOGASTRODUODENOSCOPY);  Surgeon: Annamaria Mendoza MD;  Location: [facility] ENDO (2ND FLR);  Service: Endoscopy;  Laterality: N/A;  pulmonary htn     ESOPHAGOGASTRODUODENOSCOPY N/A 02/02/2021    Procedure: ESOPHAGOGASTRODUODENOSCOPY (EGD);  Surgeon: Annamaria Mendoza MD;  Location: [facility] ENDO (2ND FLR);  Service: Endoscopy;  Laterality: N/A;  2nd floor gastroparesis  3 days full liquid diet and 1 day clears  covid test 1/30 primary care, instructions sent to [facility]    ESOPHAGOGASTRODUODENOSCOPY N/A 07/01/2022    Procedure: ESOPHAGOGASTRODUODENOSCOPY (EGD);  Surgeon: Annamaria Mendoza MD;  Location: [facility] ENDO (2ND FLR);  Service: Endoscopy;  Laterality: N/A;  2nd floor-gastroparesis  full liquid diet x3 days, clear liquid diet x1 day prior to procedure  fully vaccinated, instructions sent to [facility]  6/29-pt confirmed new arrival time-[facility]    EXCISION, LESION, STOMACH, LAPAROSCOPIC N/A 03/23/2023    Procedure: XI ROBOTIC EXCISION, LESION, STOMACH;  Surgeon: Katherine Segura MD;  Location: [facility] OR [address] [place] [address];  Service: General;  Laterality: N/A;    EXCISIONAL BIOPSY, LYMPH NODE Right 05/26/2023    Procedure: EXCISIONAL BIOPSY, LYMPH NODE, INGUINAL;  Surgeon: Katherine Segura MD;  Location: [facility] OR [address] [place] [address];  Service: General;  Laterality: Right;    HYSTERECTOMY  1990    FRANDY (AUB, Fibroids), ovaries remain    REPAIR, HERNIA, UMBILICAL N/A 03/23/2023    Procedure: REPAIR, HERNIA, UMBILICAL;  Surgeon: Katherine Segura MD;  Location: [facility] OR [address] [place] [address];  Service: General;  Laterality: N/A;    RIGHT HEART CATHETERIZATION Right 01/05/2021    Procedure: INSERTION, CATHETER, RIGHT HEART;  Surgeon: Aureliano Sy MD;  Location: [facility] CATH LAB;  Service: Cardiology;  Laterality: Right;    RIGHT HEART CATHETERIZATION Right 03/16/2023    Procedure: INSERTION, CATHETER, RIGHT HEART;  Surgeon: Aureliano Sy MD;  Location: [facility] CATH LAB;  Service: Cardiology;  Laterality: Right;    RIGHT HEART CATHETERIZATION Right 11/22/2024    Procedure: INSERTION, CATHETER, RIGHT HEART;  Surgeon: Tamanna Martins MD;  Location: [facility] CATH LAB;   Service: Cardiology;  Laterality: Right;    ROBOT-ASSISTED LAPAROSCOPIC REPAIR OF INGUINAL HERNIA USING DA VERNELL XI Right 2023    Procedure: XI ROBOTIC REPAIR, HERNIA, INGUINAL, FEMORAL;  Surgeon: Katherine Segura MD;  [facility];  Service: General;  Laterality: Right;    ROBOT-ASSISTED REPAIR OF HIATAL HERNIA USING DA VERNELL XI N/A 2023    Procedure: XI ROBOTIC REPAIR, HERNIA, HIATAL;  Surgeon: Katherine Segura MD;  [facility];  Service: General;  Laterality: N/A;    VARICOSE VEIN SURGERY      XI ROBOTIC GASTROPEXY N/A 2023    Procedure: XI ROBOTIC GASTROPEXY;  Surgeon: Katherine Segura MD;  [facility];  Service: General;  Laterality: N/A;    XI ROBOTIC PYLOROMYOTOMY N/A 2023    Procedure: XI ROBOTIC PYLOROMYOTOMY;  Surgeon: Katherine Segura MD;  [facility];  Service: General;  Laterality: N/A;        Objective:     Vitals  Wt Readings from Last 1 Encounters:   24 62.8 kg (138 lb 7.2 oz)     Temp Readings from Last 1 Encounters:   24 98.4 °F (36.9 °C) (Temporal)     BP Readings from Last 1 Encounters:   25 133/75     Pulse Readings from Last 1 Encounters:   25 88       Dermatological Exam    Skin:  Skin of both feet thickened (scleroderma), stiff, with extensive ulcerations of both feet down to fat with slowly improving granulation; xerosis       Nails:  10 nail(s) thickened, and 10 nail(s) discolored    Vascular Exam    Arteries:  Posterior tibial artery palpable on right  Dorsalis pedis artery palpable on right  Posterior tibial artery palpable on left  Dorsalis pedis artery palpable on left    Veins:  Superficial veins unremarkable on right  Superficial veins unremarkable on left    Swellin+ nonpitting on right  1+ nonpitting on left    Neurological Exam    Haynes touch test:  6/6 sites sensed, light touch intact     Musculoskeletal Exam    Footwear:  Casual on  right  Casual on left    Gait Exam:   Ambulatory Status: Ambulatory  Gait: Normal  Assistive Devices: None    Foot Progression Angle:  Normal on right  Normal on left    Right Lower Extremity Additional Findings:  Right foot and ankle function, strength, and range of motion unremarkable except as noted above.     Left Lower Extremity Additional Findings:  Left foot and ankle function, strength, and range of motion unremarkable except as noted above.    Imaging and Other Tests    Imaging:  Independently reviewed and interpreted imaging, findings are as follows: N/A     Assessment:     Encounter Diagnoses   Name Primary?    Ulcers of both lower extremities with fat layer exposed Yes    Cellulitis of left lower extremity     Scleroderma           Plan:     I counseled the patient on her conditions, their implications and medical management.    Neuropathy: chronic stable  -Continue duloxetine 60 mg/day.   -Continue amitriptyline 10 mg/night.     Bilateral ulcers, cellulitis, swelling: chronic stable   -Dressings changed. Leave intact as long as possible then continue usual dressing changes.   -Protected WB in surgical shoes.    -Establish care with ID as scheduled.     Scleroderma: chronic stable  -Continue PO tadalafil 40 mg/day.      I spent a total of 30 minutes on the day of the visit.  This includes face to face time and non-face to face time preparing to see the patient (eg, review of tests), obtaining and/or reviewing separately obtained history, documenting clinical information in the electronic or other health record, independently interpreting results and communicating results to the patient/family/caregiver, or care coordinator.     Return to clinic in 1-2 weeks, call sooner PRN.

## 2025-01-24 NOTE — TELEPHONE ENCOUNTER
Patient requesting refill on: Tylenol #4  Last office visit: 11.05.24   shows last refill on: 12.30.24  Patient does have a pain contract on file with [facility] department  Patient last UDS: 07.30.24 was consistent with current therapy.                    CODEINE  Present Present CM   Not Detected     Comment: INTERPRETIVE INFORMATION: Codeine, U  Positive Cutoff: 40 ng/mL  Methodology: Mass Spectrometry   MORPHINE  Present Present CM   Not Detected     Comment: INTERPRETIVE INFORMATION:Morphine, U  Positive Cutoff: 20 ng/mL  Methodology: Mass Spectrometry   6-ACETYLMORPHINE  Not Detected Not Detected CM   Not Detected     Comment: INTERPRETIVE INFORMATION:6-acetylmorphine, U  Positive Cutoff: 20 ng/mL  Methodology: Mass Spectrometry   OXYCODONE  Not Detected Not Detected CM   Not Detected     Comment: INTERPRETIVE INFORMATION:Oxycodone, U  Positive Cutoff: 40 ng/mL  Methodology: Mass Spectrometry   NOROYXCODONE  Not Detected Not Detected CM   Not Detected     Comment: INTERPRETIVE INFORMATION:Noroxycodone, U  Positive Cutoff: 100 ng/mL  Methodology: Mass Spectrometry   OXYMORPHONE  Not Detected Not Detected CM   Not Detected     Comment: INTERPRETIVE INFORMATION:Oxymorphone, U  Positive Cutoff: 40 ng/mL  Methodology: Mass Spectrometry   NOROXYMORPHONE  Not Detected Not Detected CM   Not Detected     Comment: INTERPRETIVE INFORMATION:Noroxymorphone, U  Positive Cutoff: 100 ng/mL  Methodology: Mass Spectrometry   HYDROCODONE  Present Not Detected CM   Not Detected     Comment: INTERPRETIVE INFORMATION:Hydrocodone, U  Positive Cutoff: 40 ng/mL  Methodology: Mass Spectrometry   NORHYDROCODONE  Not Detected Not Detected CM   Not Detected     Comment: INTERPRETIVE INFORMATION:Norhydrocodone, U  Positive Cutoff: 100 ng/mL  Methodology: Mass Spectrometry   HYDROMORPHONE  Not Detected Not Detected CM   Not Detected     Comment: INTERPRETIVE INFORMATION:Hydromorphone, U  Positive Cutoff: 20  ng/mL  Methodology: Mass Spectrometry   BUPRENORPHINE  Not Detected Not Detected CM   Not Detected     Comment: INTERPRETIVE INFORMATION:Buprenorphine, U  Positive Cutoff: 5 ng/mL  Methodology: Mass Spectrometry   NORUBPRENORPHINE  Not Detected Not Detected CM   Not Detected     Comment: INTERPRETIVE INFORMATION:Norbuprenorphine, U  Positive Cutoff: 20 ng/mL  Methodology: Mass Spectrometry   FENTANYL  Not Detected Not Detected CM   Not Detected     Comment: INTERPRETIVE INFORMATION:Fentanyl, U  Positive Cutoff: 2 ng/mL  Methodology: Mass Spectrometry   NORFENTANYL  Not Detected Not Detected CM   Not Detected     Comment: INTERPRETIVE INFORMATION:Norfentanyl, U  Positive Cutoff: 2 ng/mL  Methodology: Mass Spectrometry   MEPERIDINE METABOLITE  Not Detected Not Detected CM   Not Detected     Comment: INTERPRETIVE INFORMATION:Meperidine metabolite, U  Positive Cutoff: 50 ng/mL  Methodology: Mass Spectrometry   TAPENTADOL  Not Detected Not Detected CM   Not Detected     Comment: INTERPRETIVE INFORMATION:Tapentadol, U  Positive Cutoff: 100 ng/mL  Methodology: Mass Spectrometry   TAPENTADOL-O-SULF  Not Detected Not Detected CM   Not Detected     Comment: INTERPRETIVE INFORMATION:Tapentadol-o-Sulf, U  Positive Cutoff: 200 ng/mL  Methodology: Mass Spectrometry   METHADONE  Negative Negative CM   Not Detected     Comment: Presumptive negative by immunoassay. Testing by mass  spectrometry is available on request.  INTERPRETIVE INFORMATION: Methadone Screen, U  Positive Cutoff: 150 ng/mL  Methodology: Immunoassay   TRAMADOL  Negative Negative CM   Not Detected     Comment: Presumptive negative by immunoassay. Testing by mass  spectrometry is available on request.  INTERPRETIVE INFORMATION:Tramadol Screen, U  Positive Cutoff: 100 ng/mL  Methodology: Immunoassay   AMPHETAMINE  Not Detected Not Detected CM   Not Detected     Comment: INTERPRETIVE INFORMATION:Amphetamine, U  Positive Cutoff: 50 ng/mL  Methodology: Mass Spectrometry    METHAMPHETAMINE  Not Detected Not Detected CM   Not Detected     Comment: INTERPRETIVE INFORMATION:Methamphetamine, U  Positive Cutoff: 200 ng/mL  Methodology: Mass Spectrometry   MDMA- ECSTASY  Not Detected Not Detected CM   Not Detected     Comment: INTERPRETIVE INFORMATION:MDMA, U  Positive Cutoff: 200 ng/mL  Methodology: Mass Spectrometry   MDA  Not Detected Not Detected CM   Not Detected     Comment: INTERPRETIVE INFORMATION:MDA, U  Positive Cutoff: 200 ng/mL  Methodology: Mass Spectrometry   MDEA- Mickie  Not Detected Not Detected CM   Not Detected     Comment: INTERPRETIVE INFORMATION:MDEA, U  Positive Cutoff: 200 ng/mL  Methodology: Mass Spectrometry   METHYLPHENIDATE  Not Detected Not Detected CM   Not Detected     Comment: INTERPRETIVE INFORMATION:Methylphenidate, U  Positive Cutoff: 100 ng/mL  Methodology: Mass Spectrometry   PHENTERMINE  Not Detected Not Detected CM   Not Detected     Comment: INTERPRETIVE INFORMATION:Phentermine, U  Positive Cutoff: 100 ng/mL  Methodology: Mass Spectrometry   BENZOYLECGONINE  Negative Negative CM   Not Detected     Comment: Presumptive negative by immunoassay. Testing by mass  spectrometry is available on request.  INTERPRETIVE INFORMATION:Cocaine Screen, U  Positive Cutoff: 150 ng/mL  Methodology: Immunoassay   ALPRAZOLAM  Not Detected Not Detected CM   Not Detected     Comment: INTERPRETIVE INFORMATION:Alprazolam, U  Positive Cutoff: 40 ng/mL  Methodology: Mass Spectrometry   ALPHA-OH-ALPRAZOLAM  Not Detected Not Detected CM   Not Detected     Comment: INTERPRETIVE INFORMATION:Alpha-OH-Alprazolam, U  Positive Cutoff: 20 ng/mL  Methodology: Mass Spectrometry   CLONAZEPAM  Not Detected Not Detected CM   Not Detected     Comment: INTERPRETIVE INFORMATION:Clonazepam, U  Positive Cutoff: 20 ng/mL  Methodology: Mass Spectrometry   7-AMINOCLONAZEPAM  Not Detected Not Detected CM   Not Detected     Comment: INTERPRETIVE INFORMATION:7-Aminoclonazepam, U  Positive Cutoff: 40  ng/mL  Methodology: Mass Spectrometry   DIAZEPAM  Not Detected Not Detected CM   Not Detected     Comment: INTERPRETIVE INFORMATION:Diazepam, U  Positive Cutoff: 50 ng/mL  Methodology: Mass Spectrometry   NORDIAZEPAM  Not Detected Not Detected CM   Not Detected     Comment: INTERPRETIVE INFORMATION:Nordiazepam, U  Positive Cutoff: 50 ng/mL  Methodology: Mass Spectrometry   OXAZEPAM  Not Detected Not Detected CM   Not Detected     Comment: INTERPRETIVE INFORMATION:Oxazepam, U  Positive Cutoff: 50 ng/mL  Methodology: Mass Spectrometry   TEMAZEPAM  Not Detected Not Detected CM   Not Detected     Comment: INTERPRETIVE INFORMATION:Temazepam, U  Positive Cutoff: 50 ng/mL  Methodology: Mass Spectrometry   Lorazepam  Not Detected Not Detected CM   Not Detected     Comment: INTERPRETIVE INFORMATION:Lorazepam, U  Positive Cutoff: 60 ng/mL  Methodology: Mass Spectrometry   MIDAZOLAM  Not Detected Not Detected CM   Not Detected     Comment: INTERPRETIVE INFORMATION:Midazolam, U  Positive Cutoff: 20 ng/mL  Methodology: Mass Spectrometry   ZOLPIDEM  Not Detected Not Detected CM   Not Detected     Comment: INTERPRETIVE INFORMATION:Zolpidem, U  Positive Cutoff: 20 ng/mL  Methodology: Mass Spectrometry   BARBITURATES  Negative Negative CM   Not Detected     Comment: Presumptive negative by immunoassay. Testing by mass  spectrometry is available on request.  INTERPRETIVE INFORMATION:Barbiturates Screen, U  Positive Cutoff: 200 ng/mL  Methodology: Immunoassay   Creatinine, Urine 20.0 - 400.0 mg/dL 145.0 112.0 47.0 R 103.0 R 344.5 139.0 R,  R   ETHYL GLUCURONIDE  Negative Negative CM   Present     Comment: Presumptive negative by immunoassay. Testing by mass  spectrometry is available on request.  INTERPRETIVE INFORMATION:Ethyl Glucuronide Screen, U  Positive Cutoff: 500 ng/mL  Methodology: Immunoassay   MARIJUANA METABOLITE  Negative Negative CM   Not Detected     Comment: Presumptive negative by immunoassay. Testing by  mass  spectrometry is available on request.  INTERPRETIVE INFORMATION: THC (Cannabinoids) Screen, U  Positive Cutoff: 50 ng/mL  Methodology: Immunoassay   PCP  Negative Negative CM   Not Detected     Comment: Presumptive negative by immunoassay. Testing by mass  spectrometry is available on request.  INTERPRETIVE INFORMATION:Phencyclidine Screen, U  Positive Cutoff: 25 ng/mL  Methodology: Immunoassay   CARISOPRODOL  Negative Negative CM   Not Detected CM     Comment: Presumptive negative by immunoassay. Testing by mass  spectrometry is available on request.  INTERPRETIVE INFORMATION: Carisoprodol Screen, U  Positive Cutoff: 100 ng/mL  Methodology: Immunoassay  The carisoprodol immunoassay has cross-reactivity to  carisoprodol and meprobamate.   Naloxone  Not Detected Not Detected CM   Not Detected     Comment: INTERPRETIVE INFORMATION:Naloxone, U  Positive Cutoff: 100 ng/mL  Methodology: Mass Spectrometry   Gabapentin  Not Detected Not Detected CM   Not Detected     Comment: INTERPRETIVE INFORMATION:Gabapentin, U  Positive Cutoff: 3,000 ng/mL  Methodology: Mass Spectrometry   Pregabalin  Present Present CM   Present     Comment: INTERPRETIVE INFORMATION:Pregabalin, U  Positive Cutoff: 3,000 ng/mL  Methodology: Mass Spectrometry   Alpha-OH-Midazolam  Not Detected Not Detected CM   Not Detected     Comment: INTERPRETIVE INFORMATION:Alpha-OH-Midazolam, U  Positive Cutoff: 20 ng/mL  Methodology: Mass Spectrometry   Zolpidem Metabolite  Not Detected Not Detected CM   Not Detected

## 2025-01-31 ENCOUNTER — OFFICE VISIT (OUTPATIENT)
Dept: PODIATRY | Facility: CLINIC | Age: 75
End: 2025-01-31
Payer: MEDICARE

## 2025-01-31 VITALS
SYSTOLIC BLOOD PRESSURE: 125 MMHG | BODY MASS INDEX: 23.04 KG/M2 | HEIGHT: 65 IN | HEART RATE: 78 BPM | DIASTOLIC BLOOD PRESSURE: 58 MMHG

## 2025-01-31 DIAGNOSIS — L97.922 ULCERS OF BOTH LOWER EXTREMITIES WITH FAT LAYER EXPOSED: Primary | ICD-10-CM

## 2025-01-31 DIAGNOSIS — L03.116 CELLULITIS OF LEFT LOWER EXTREMITY: ICD-10-CM

## 2025-01-31 DIAGNOSIS — M34.9 SCLERODERMA: ICD-10-CM

## 2025-01-31 DIAGNOSIS — L97.912 ULCERS OF BOTH LOWER EXTREMITIES WITH FAT LAYER EXPOSED: Primary | ICD-10-CM

## 2025-01-31 PROCEDURE — 99215 OFFICE O/P EST HI 40 MIN: CPT | Mod: S$PBB,,, | Performed by: STUDENT IN AN ORGANIZED HEALTH CARE EDUCATION/TRAINING PROGRAM

## 2025-01-31 PROCEDURE — 99999 PR PBB SHADOW E&M-EST. PATIENT-LVL IV: CPT | Mod: PBBFAC,,, | Performed by: STUDENT IN AN ORGANIZED HEALTH CARE EDUCATION/TRAINING PROGRAM

## 2025-01-31 PROCEDURE — 99214 OFFICE O/P EST MOD 30 MIN: CPT | Mod: PBBFAC | Performed by: STUDENT IN AN ORGANIZED HEALTH CARE EDUCATION/TRAINING PROGRAM

## 2025-01-31 NOTE — PROGRESS NOTES
Subjective:     Patient    Yamel Shah is a 74 y.o. female.    Problem    2024: Previously seen by multiple wound care providers without significant improvement in wounds, has bounced around a lot. Has bilateral foot wounds related to scleroderma and immunosuppressant medications. Had improvement in granulation with regular wound care, oral and topical antibiotics, one debridement. Attempted topical vitamin E but caused burning.     01/03/25: Returns for bilateral foot wound care. No new concerns. Had pain flare on left foot, still sensitive.     01/08/25: Returns for bilateral foot wound care. No new concerns.     01/16/25: Returns for bilateral foot wound care. Awaiting appointment with ID 02/14. No new concerns.     01/24/25: Returns for bilateral foot wound care. Pain controlled on current medications. No new concerns.     01/31/25: Returns for bilateral foot wound care. Not tolerating amitriptyline well; still with nerve pain, tingling, cramping, burning despite duloxetine, tolerates duloxetine well.       History    History obtained from patient and review of medical records.     Past Medical History:   Diagnosis Date    Abnormal Pap smear     Acid reflux     Allergy     Arthritis     Encounter for blood transfusion     GERD (gastroesophageal reflux disease)     Hiatal hernia 03/24/2023    History of migraine headaches     Hx of colonic polyp     Hypertension     Idiopathic neuropathy 07/20/2012    ILD (interstitial lung disease) 11/06/2013    Iron deficiency anemia 03/18/2014    MRSA carrier     Osteopenia     Pneumonia     Pulmonary fibrosis     Pulmonary hypertension     Raynaud's disease     Scleroderma, diffuse     Sjogren's syndrome     Vitamin D deficiency 11/14/2013       Past Surgical History:   Procedure Laterality Date    24 HOUR IMPEDANCE PH MONITORING OF ESOPHAGUS IN PATIENT NOT TAKING ACID REDUCING MEDICATIONS N/A 03/04/2020    Procedure: IMPEDANCE PH STUDY, ESOPHAGEAL, 24  HOUR, IN PATIENT NOT TAKING ACID REDUCING MEDICATION;  Surgeon: Annamaria Mendoza MD;  Location: [facility] ENDO (4TH FLR);  Service: Endoscopy;  Laterality: N/A;  OFF PPI/H2 Blocker   Motility Studies   Hold Narcotics x 1 days   Hold TCA x 1 days  2/26 - LVM attempting to confirm appt  2/27 - Confirmed appt    ANORECTAL MANOMETRY N/A 05/10/2021    Procedure: MANOMETRY, ANORECTAL with balloon expulsion test;  Surgeon: Annamaria Mendoza MD;  Location: [facility] ENDO (4TH FLR);  Service: Endoscopy;  Laterality: N/A;  order combined  covid test 5/7 [facility], instructions emailed-[facility]    BREAST BIOPSY      Left, benign    BRONCHOSCOPY N/A 10/2/2023    Procedure: bronch;  Surgeon: Roberta Leigh DO;  Location: [facility] OR 2ND FLR;  Service: Endoscopy;  Laterality: N/A;    BRONCHOSCOPY N/A 9/16/2024    Procedure: Flexible bronchoscopy (BAL only);  Surgeon: Roberta Leigh DO;  [facility];  Service: Endoscopy;  Laterality: N/A;    CERVICAL CONIZATION   W/ LASER  1970    COLONOSCOPY      COLONOSCOPY N/A 03/29/2019    Procedure: COLONOSCOPY;  Surgeon: Annamaria Mendoza MD;  Location: [facility] (2ND FLR);  Service: Endoscopy;  Laterality: N/A;    COLONOSCOPY N/A 05/10/2021    Procedure: COLONOSCOPY;  Surgeon: Annamaria Mendoza MD;  Location: [facility] (4TH FLR);  Service: Endoscopy;  Laterality: N/A;  2nd floor-previous scopes done on 2nd floor, gastroparesis  full liquid diet x2 days, clear liquid x1 day prior to procedure  covid test 5/7 [facility], instructions emailed-[facility]  5/6 pt confirmed appt-[facility]    DILATION AND CURETTAGE OF UTERUS      ESOPHAGEAL MANOMETRY WITH MEASUREMENT OF IMPEDANCE N/A 03/04/2020    Procedure: MANOMETRY, ESOPHAGUS, WITH IMPEDANCE MEASUREMENT;  Surgeon: Annamaria Mendoza MD;  Location: [facility] ENDO (4TH FLR);  Service: Endoscopy;  Laterality: N/A;  OFF PPI/H2 Blocker   Motility Studies   Hold Narcotics x 1 days   Hold TCA x 1 days    ESOPHAGOGASTRODUODENOSCOPY      ESOPHAGOGASTRODUODENOSCOPY  N/A 03/29/2019    Procedure: EGD (ESOPHAGOGASTRODUODENOSCOPY);  Surgeon: Annamaria Mendoza MD;  Location: [facility] ENDO (2ND FLR);  Service: Endoscopy;  Laterality: N/A;  pulmonary htn    ESOPHAGOGASTRODUODENOSCOPY N/A 02/02/2021    Procedure: ESOPHAGOGASTRODUODENOSCOPY (EGD);  Surgeon: Annamaria Mendoza MD;  Location: [facility] ENDO (2ND FLR);  Service: Endoscopy;  Laterality: N/A;  2nd floor gastroparesis  3 days full liquid diet and 1 day clears  covid test 1/30 primary care, instructions sent to [facility]    ESOPHAGOGASTRODUODENOSCOPY N/A 07/01/2022    Procedure: ESOPHAGOGASTRODUODENOSCOPY (EGD);  Surgeon: Annamaria Mendoza MD;  Location: [facility] ENDO (2ND FLR);  Service: Endoscopy;  Laterality: N/A;  2nd floor-gastroparesis  full liquid diet x3 days, clear liquid diet x1 day prior to procedure  fully vaccinated, instructions sent to [facility]  6/29-pt confirmed new arrival time-[facility]    EXCISION, LESION, STOMACH, LAPAROSCOPIC N/A 03/23/2023    Procedure: XI ROBOTIC EXCISION, LESION, STOMACH;  Surgeon: Katherine Segura MD;  Location: [facility] OR [address] [place] [address];  Service: General;  Laterality: N/A;    EXCISIONAL BIOPSY, LYMPH NODE Right 05/26/2023    Procedure: EXCISIONAL BIOPSY, LYMPH NODE, INGUINAL;  Surgeon: Katherine Segura MD;  Location: [facility] OR [address] [place] [address];  Service: General;  Laterality: Right;    HYSTERECTOMY  1990    FRANDY (AUB, Fibroids), ovaries remain    REPAIR, HERNIA, UMBILICAL N/A 03/23/2023    Procedure: REPAIR, HERNIA, UMBILICAL;  Surgeon: Katherine Segura MD;  [facility];  Service: General;  Laterality: N/A;    RIGHT HEART CATHETERIZATION Right 01/05/2021    Procedure: INSERTION, CATHETER, RIGHT HEART;  Surgeon: Aureliano Sy MD;  Location: [facility] CATH LAB;  Service: Cardiology;  Laterality: Right;    RIGHT HEART CATHETERIZATION Right 03/16/2023    Procedure: INSERTION, CATHETER, RIGHT HEART;  Surgeon: Aureliano Sy MD;  Location: [facility] CATH LAB;  Service:  Cardiology;  Laterality: Right;    RIGHT HEART CATHETERIZATION Right 11/22/2024    Procedure: INSERTION, CATHETER, RIGHT HEART;  Surgeon: Tamanna Martins MD;  Location: [facility] CATH LAB;  Service: Cardiology;  Laterality: Right;    ROBOT-ASSISTED LAPAROSCOPIC REPAIR OF INGUINAL HERNIA USING DA VERNELL XI Right 05/26/2023    Procedure: XI ROBOTIC REPAIR, HERNIA, INGUINAL, FEMORAL;  Surgeon: Katherine Segura MD;  [facility];  Service: General;  Laterality: Right;    ROBOT-ASSISTED REPAIR OF HIATAL HERNIA USING DA VERNELL XI N/A 03/23/2023    Procedure: XI ROBOTIC REPAIR, HERNIA, HIATAL;  Surgeon: Katherine Segura MD;  [facility];  Service: General;  Laterality: N/A;    VARICOSE VEIN SURGERY      XI ROBOTIC GASTROPEXY N/A 03/23/2023    Procedure: XI ROBOTIC GASTROPEXY;  Surgeon: Katherine Segura MD;  [facility];  Service: General;  Laterality: N/A;    XI ROBOTIC PYLOROMYOTOMY N/A 03/23/2023    Procedure: XI ROBOTIC PYLOROMYOTOMY;  Surgeon: Katherine Segura MD;  [facility];  Service: General;  Laterality: N/A;        Objective:     Vitals  Wt Readings from Last 1 Encounters:   12/19/24 62.8 kg (138 lb 7.2 oz)     Temp Readings from Last 1 Encounters:   11/22/24 98.4 °F (36.9 °C) (Temporal)     BP Readings from Last 1 Encounters:   01/31/25 (!) 125/58     Pulse Readings from Last 1 Encounters:   01/31/25 78       Dermatological Exam    Skin:  Skin of both feet thickened (scleroderma), stiff, with extensive ulcerations of both feet down to fat with slowly improving granulation; xerosis       Nails:  10 nail(s) thickened, and 10 nail(s) discolored    Vascular Exam    Arteries:  Posterior tibial artery palpable on right  Dorsalis pedis artery palpable on right  Posterior tibial artery palpable on left  Dorsalis pedis artery palpable on left    Veins:  Superficial veins unremarkable on right  Superficial veins unremarkable on  left    Swellin+ nonpitting on right  1+ nonpitting on left    Neurological Exam    [place] touch test:  6/6 sites sensed, light touch intact     Musculoskeletal Exam    Footwear:  Casual on right  Casual on left    Gait Exam:   Ambulatory Status: Ambulatory  Gait: Normal  Assistive Devices: None    Foot Progression Angle:  Normal on right  Normal on left    Right Lower Extremity Additional Findings:  Right foot and ankle function, strength, and range of motion unremarkable except as noted above.     Left Lower Extremity Additional Findings:  Left foot and ankle function, strength, and range of motion unremarkable except as noted above.    Imaging and Other Tests    Imaging:  Independently reviewed and interpreted imaging, findings are as follows: N/A     Assessment:     Encounter Diagnoses   Name Primary?    Ulcers of both lower extremities with fat layer exposed Yes    Scleroderma     Cellulitis of left lower extremity             Plan:     I counseled the patient on her conditions, their implications and medical management.    Neuropathy: chronic stable  -Stop amitriptyline.   -Increase duloxetine to 120 mg/day.      Bilateral ulcers, cellulitis, swelling: chronic stable   -Dressings changed. Leave intact as long as possible then continue usual dressing changes.   -Protected WB in surgical shoes.    -Establish care with ID as scheduled.     Scleroderma: chronic stable  -Continue PO tadalafil 40 mg/day.      I spent a total of 40 minutes on the day of the visit.  This includes face to face time and non-face to face time preparing to see the patient (eg, review of tests), obtaining and/or reviewing separately obtained history, documenting clinical information in the electronic or other health record, independently interpreting results and communicating results to the patient/family/caregiver, or care coordinator.     Return to clinic in 1-2 weeks, call sooner PRN.

## 2025-02-04 ENCOUNTER — OFFICE VISIT (OUTPATIENT)
Dept: PAIN MEDICINE | Facility: CLINIC | Age: 75
End: 2025-02-04
Payer: MEDICARE

## 2025-02-04 VITALS
HEART RATE: 83 BPM | DIASTOLIC BLOOD PRESSURE: 59 MMHG | TEMPERATURE: 98 F | WEIGHT: 137.56 LBS | BODY MASS INDEX: 22.89 KG/M2 | SYSTOLIC BLOOD PRESSURE: 112 MMHG

## 2025-02-04 DIAGNOSIS — M79.18 MYOFASCIAL PAIN: ICD-10-CM

## 2025-02-04 DIAGNOSIS — G89.4 CHRONIC PAIN DISORDER: Primary | ICD-10-CM

## 2025-02-04 DIAGNOSIS — Z79.891 ENCOUNTER FOR MONITORING OPIOID MAINTENANCE THERAPY: ICD-10-CM

## 2025-02-04 DIAGNOSIS — M34.89 CUTANEOUS SCLERODERMA: ICD-10-CM

## 2025-02-04 DIAGNOSIS — G60.9 IDIOPATHIC NEUROPATHY: ICD-10-CM

## 2025-02-04 DIAGNOSIS — Z51.81 ENCOUNTER FOR MONITORING OPIOID MAINTENANCE THERAPY: ICD-10-CM

## 2025-02-04 PROCEDURE — 99213 OFFICE O/P EST LOW 20 MIN: CPT | Mod: PBBFAC | Performed by: NURSE PRACTITIONER

## 2025-02-04 PROCEDURE — 99999 PR PBB SHADOW E&M-EST. PATIENT-LVL III: CPT | Mod: PBBFAC,,, | Performed by: NURSE PRACTITIONER

## 2025-02-04 PROCEDURE — 80348 DRUG SCREENING BUPRENORPHINE: CPT | Mod: TXP | Performed by: NURSE PRACTITIONER

## 2025-02-04 PROCEDURE — 99214 OFFICE O/P EST MOD 30 MIN: CPT | Mod: S$PBB,,, | Performed by: NURSE PRACTITIONER

## 2025-02-04 NOTE — PROGRESS NOTES
Chronic Pain - Follow Up          Chief Complaint:   Chief Complaint   Patient presents with    Follow-up    Low-back Pain        SUBJECTIVE: Disclaimer: This note has been generated using voice-recognition software. There may be typographical errors that have been missed during proof-reading      Interval History 2/4/2025:  The patient returns to clinic today for follow up of foot pain. She continues to have wounds to both feet. She is seeing Podiatry weekly for this. She continues to report bilateral foot pain. She also notes worsened restless leg symptoms. She recently increased Cymbalta to BID with benefit. She is also taking Lyrica, Relafen, and Zanaflex. She does take Tylenol #4 with benefit. She denies any adverse effects. She denies any other health changes. Her pain today is 6/10.    Interval History 11/5/2024:  The patient returns to clinic today for follow up of foot pain. She is having worsening foot pain. She now has non-healing ulcers on both feet. She is following with Podiatry for wound care. She was started of Cymbalta which has been helpful. She also takes Lyrica, Relafen, and Zanaflex. She also takes Tylenol #4 as needed with benefit. She denies any adverse effects. She denies any other health changes. Her pain today is 6/10.    Interval History 7/30/2024:  The patient returns to clinic today for follow up of foot pain. She continues to report bilateral foot pain. She continues to have bilateral wounds to her feet. She does have worsened pain with certain shoes. She is currently taking Lyrica, Relafen, and Zanaflex with benefit. She also takes Tylenol #4 with benefit. She denies any adverse effects. She denies any other health changes. Her pain today is 6/10.    Interval History 4/29/2024:  The patient returns to clinic today for follow up of foot pain. She continues to report bilateral foot pain. Pain was more significant this weekend with swelling (R>L), denies inciting incident. Reported 7/10  pain. Improved with ace wrap on leg and increasing dose of tylenol #3 and tizanidine. Current pain 6/10. She is currently taking Lyrica, Relafen, Zanaflex and Tylenol #3 as needed with benefit. She denies any other health changes. Patient denies red flags including weakness, unexpected weight loss/gain, night sweats/fevers, saddle anesthesia, and symptoms of UBALDO.    Interval History 1/26/2024:  The patient returns to clinic today for follow up of foot pain. She continues to report bilateral foot pain. This pain is worse at night. She does sometimes wake up due to pain. She continues to have open wounds, currently on her ankles. She is currently taking Lyrica, Relafen, and Zanaflex. She also takes Tylenol #3 as needed with benefit. She denies any other health changes. Her pain today is 5/10.    Interval History 10/27/2023:  The patient returns to clinic today for follow up of foot pain. She continues to report bilateral foot pain. She describes this pain as burning and stinging in nature. She now has sores under her ankles. She is having trouble laying on her sides at night due to pain. The sores by her toes are healing. She continues to report benefit with home medication regimen. She is taking Lyrica, Zanaflex, and Relafen. She also takes Tylenol #3 with benefit. She is out of this medication. She denies any adverse effects. She denies any other health changes. Her pain today is 5/10.     Interval History 7/28/2023:  The patient returns to clinic today for follow up of neck and foot pain via virtual visit. She continues to report bilateral foot pain. This is worse at night, described as burning in nature. She does report some new ulcers to her feet. She does have a home wound care regimen. She continues to report intermittent neck pain. She is taking Lyrica, Zanaflex, and Relafen with benefit. She also takes Tylenol #3 as needed for severe pain with benefit and without adverse effects. She denies any other health  changes.     Interval History 4/28/2023:  The patient returns to clinic today for follow up of neck and foot pain. Since last visit, she has had pneumonia. She also had hiatal hernia repair. She continues to report bilateral foot pain. This is burning in nature. Her pain is worse at night. She reports intermittent neck pain. She continues to take Lyrica, Zanaflex, and Relafen with benefit. She also takes Tylenol #3 for severe pain. She denies any adverse effects. She denies any other health changes. Her pain today is 4/10.    Interval History 12/30/2022:  The patient returns to clinic today for follow up of neck and foot pain. She reports increased foot pain over the last 2 months. She continues to report ulcers to her feet. She reports bilateral foot pain. Her pain is worse at night. She reports intermittent neck pain. She is taking Lyrica, Zanaflex, and Relafen with benefit. She also takes Tylenol #3 with benefit. She denies any adverse effects. She denies any other health changes. Her pain today is 6/10.    Interval History 8/26/2022:  The patient returns to clinic today for follow up neck and foot pain. She reports increased pain over the last few days. She attributes this as she is out of Lyrica. She continues to report bilateral foot pain. She continues to have wounds. She continues to perform wound care. She continues to report intermittent neck pain. She continues to take Lyrica, Zanaflex, and Relafen with benefit. She continues to take Tylenol #3 for severe pain as needed with benefit. She denies any adverse effects. She denies any other health changes. Her pain today is 4/10.    Interval History 5/26/2022:  The patient returns to clinic today for follow up of neck and foot pain via virtual visit. She continues to report bilateral foot pain. She continues to report ulcers to her feet. She continues to report neck pain with intermittent radiating pain into her arms. She continues to report benefit with  current medications. She is taking Lyrica, Zanaflex, and Relafen. She also takes Tylenol #3 with benefit. She denies any adverse effects with these medications. She continues to perform a home exercise routine. She denies any other health changes.     Interval History 2/15/22  Patient presents in follow up. Was previously Dr. King patient with primary complaints of neck and foot pain. She reports her pain has been stable since her last visit. She did have covid in January and stopped all of ehr pain medications. She got an antibody infusion. She has fully recovered. She restarted her pain medications and reports that they are effective. She is taking Tylenol 3 daily, nambumetome 750 mg BID, lyrica 150 mg BID and Zanaflex 4 mg TID. She did do PT for her neck November to January per her report. She continues with mild neck pain without radiation into the arms or hands. Pain is worse with sidebending and rotation to the right and better with rest and medications. She denies any numbness tingling weakness or b/b incontinence.    Interval History 1/4/2022:  The patient returns to clinic today for follow up of foot pain via virtual visit. She continues to report foot pain and wounds. She continues to follow up with podiatry and wound care. She reports increased neck pain. This pain is tight and aching in nature. She denies any radicular arm pain. She is currently taking Relafen, Lyrica, and Tylenol #3 with benefit and without adverse effects. She reports limited benefit with Zanaflex. She denies any other health changes.     Interval History 12/8/2021:  The patient returns to clinic today for foot pain via virtual visit. She continues to report foot pain. She does have foot wounds. She recently saw Dr. Claudio in [facility]. He does not recommend any vascular interventions at this time. She continues to follow up with Podiatry and wound care. She continues to take Zanaflex, Relafen, Lyrica and Tylenol #3 with benefit. She  denies any adverse effects. She denies any other health changes. Her pain today is 7/10.    Interval History 11/8/2021:  The patient returns to clinic today for follow up of foot pain via virtual visit. At last visit, she was starting on Tylenol #3. She does find benefit with this. She sometimes breaks this in half. She continues to take Zanaflex, Relafen, and Lyrica. She continues to report bilateral foot pain and ulcers. She continues to follow up with podiatry. Her pain is worse at night. She denies any other health changes. Her pain today is 7/10.    Interval History 10/20/2021:  The patient returns to clinic today for follow up of foot pain. At last visit, she was started on Tramadol. She did have relief but had significant side effects. She experienced sedation, itching, headaches, and decreased appetite. This has resolved after stopping the medication. She continues to report foot pain. This pain is worse at night. She continues to follow up with podiatry. She continues to take Tizanidine, Relafen, and Lyrica with some benefit. She denies any adverse effects. She denies any other health changes. Her pain today is 8/10.    Interval history 10/06/2021:  Since previous encounter the patient continues to have nonhealing wound has been followed up with wound care and is scheduled to follow with Rheumatology for the wounds in her legs she was referred to podiatry with stated that they have done nothing different me that she with doing on her own.  She continues to have neck pain and bilateral foot pain.  She has been taking tizanidine Relafen Tylenol p.m. and Lyrica 600 mg per day.  She states that all these medications are helpful.    Interval History 6/3/2021:  The patient returns to clinic today for follow up of foot pain. She continues to report left foot pain secondary to ulcer. She is seeing Wound Care. She describes this pain as burning in nature. Her pain is worse with prolonged activity. She reports  intermittent neck pain. She continues to take Zanaflex. She reports limited benefit with increased Lyrica dose. She denies any other health changes. Her pain today is 5/10.    Interval History 5/5/2021:  The patient returns to clinic today for follow up of foot pain. She reports a new ulcer on her left foot. She is seeing Wound Care. She reports increased pain with this new ulcer. She describes this pain as burning. She continues to report neck pain that is tight and aching. She denies any radicular arm pain. Her pain is worse with prolonged activity, especially turning her head to the side. She continues to perform a home exercise routine. She continues to take Lyrica and Zanaflex with benefit. She denies any other health changes. Her pain today is 5/10.    Interval History 2/8/2021:  The patient returns to clinic today for follow up of neck and foot pain. She reports that her neck pain has significantly improved since last visit. She has recently completed physical therapy for this. She is performing a home stretching routine. She reports intermittent neck pain that is tight and aching in nature. She denies any radicular arm pain. She continues to report bilateral foot pain. This is worse at night. She continues to take Lyrica and Zanaflex with benefit. She denies any other health changes. Her pain today is 4/10.    Interval History 1/8/2021:  The patient returns to clinic today for follow up of neck and foot pain. She continues to report neck pain that is tight and aching in nature. She denies any radicular pain. She continues to participate in physical therapy and a home exercise routine. She continues to report bilateral foot pain, worse at night. She reports that increased Lyrica dose has provided improved benefit. She also takes Zanaflex with benefit. She denies any other health changes. Her pain today is 3/10.    Interval History 11/13/2020:  The patient returns to clinic today for follow up of neck and foot  pain. She continues to report bilateral foot pain. Since last visit, she had a venous ablation procedure on her right leg through [facility]. She is scheduled for the left side in the next month. She continues to report bilateral foot pain, worse at night. She continues to report neck pain that is tight and aching in nature. She denies any radicular pain. Her pain is worse flexion and turning her head to the side. She is currently participating in physical therapy with some benefit. She continues to take Lyrica but does ask if this could be increased. She continues to take Zanaflex with benefit. She denies any other health changes. Her pain today is 5/10    Interval History 10/2/2020:  The patient returns to clinic today for follow up of foot pain. She reports increased bilateral foot pain over the last few months. This pain is burning in nature. This pain is worse at night. She continues to have ulcers to her feet related to her scleroderma. She would like to restart the Lyrica. She also reports increased neck pain that is sore and aching in nature. She denies any radiating arm pain. This is worse with turning her head and at night. She denies any other health changes. Her pain today is 7/10.    Interval History 6/5/2020:  The patient requests audio visit today for follow up of bilateral foot pain. She reports intermittent foot pain that is tolerable at this time. She has discontinued Lyrica as of April. She continues to have ulcers to her feet. She does have wound care. She denies any other health changes.     Interval History 1/17/2020:  The patient returns to clinic today for follow up. She continues to report bilateral foot pain. She describes this pain as burning and tingling in nature. At last visit, we decreased her Lyrica dose to 100 mg at night. She reports increased pain since then. She would like to go to back to the 150 mg dose. She continues to have ulcers to her feet, left worse than right. She  continues to participate in a home wound care program. She denies any other health changes. Her pain today is 6/10.    Interval History 12/17/2019:  The patient returns to clinic today for follow up. She reports improving foot pain. She reports improved healing ulcers to her left foot. She continues to report right foot pain that is burning and tingling in nature. She continues to participate in a home wound care routine. She continues to take Lyrica. She asks about decreasing this. She denies any other health changes. Her pain today is 5/10.    Interval History 9/17/2019:  The patient returns to clinic today for follow up. She continues to report bilateral foot pain that is burning and tingling in nature. Her pain is worse with sitting and at night. She continues to have slow healing ulcers to both feet. She continues to perform a home wound care program. She is no longer taking Gabapentin which was previously called in. She is now taking Pregabalin generic with benefit. She denies any other health changes. Her pain today is 4/10.    Interval History 6/13/2019:  The patient returns to clinic for follow up for bilateral foot pain. She continues to report bilateral foot pain that is burning in nature. She continues to have slow healing wounds to both feet from ulcers. She continues to take Lyrica once daily but reports increased pain. She continues to take Vitamin B12. She denies any other health changes. Her pain today is 8/10.    Interval History 3/13/2019:  The patient returns to clinic today for follow up. She continues to report bilateral foot pain that is burning and tingling in nature. Her pain is worse with prolonged walking and standing. She continues to have bilateral foot wounds. She reports that these wounds have significantly improved since last visit. She is currently taking Vitamin B12 with benefit. She continues to report benefit with Lyrica. She is currently taking this once daily. She denies any  other health changes. Her pain today is 5/10.    Interval History 2/6/2019:  The patient returns to clinic today for follow up. She reports that her bilateral foot wounds have significantly improved since last visit. She does report some significant improvement in her foot pain. She reports intermittent bilateral foot pain especially with prolonged walking. She describes this pain as heavy and tingling in nature. She is no longer taking Celebrex. She is currently taking Lyrica 150 mg BID with benefit. She does report that the Lyrica is expensive for her. She denies any other health changes. Her pain today is 5/10.    Interval History 12/5/18:  Patient returns to clinic today for follow up. She reports that since increasing her medications, she is having significant improvement in pain during the day time. She is currently taking Lyrica 75mg TID and Celebrex 200mg TID. However, she states that the burning  And tingling pain in both her feet increase in the evening around 6 or 7pm. She is unable to sleep during the nights due to the pain. She is still wearing her bilateral boots due to non healing ulcers from her scleroderma.     Interval History 8/30/2018:  The patient returns to clinic today for follow up. She continues to report bilateral foot pain that is burning and tingling in nature. She reports that this pain is worse at night. She is currently taking Lyrica 75 mg BID with limited relief. She did not have any benefit with Mobic. She continues to take Aleve with some benefit. She continues to have nonhealing ulcers. She wears an ACE bandage to her right calf, as well as boots to her bilateral feet. She denies any other health changes. Her pain today is 7/10.     Interval History 7/11/2018:  Since previous encounter she has weaned from gabapentin to 600mg / day and did not notice significant worsening of pain, but was having somnelence from higher doses of the medication in the past.  We are weaning in an  attempt to trial Lyrica as an alternative to gabapentin.  Tramadol causes significant sedation, limiting its use.  She has discontinued the use of the tramadol.  Additionally she does have benefit from anti-inflammatory medication, has been using aleve, has not trialed CANTRELL-2 inhibitors in the past.    Interval history 05/16/2018:      The patient has had x-rays of the lumbar spine which did not reveal any evidence of significant neuroforaminal stenosis with degenerative disc disease.  There is mild facet arthropathy.  She has escalated her gabapentin and is currently taking 2100 mg of gabapentin per day without any noticeable improvement but daytime somnolence.  She continues to have nonhealing ulcers and topical pain cream is helping to a limited degree over her leg in areas where there is no skin disruption.    Initial encounter:    Yamel Shah presents to the clinic for the evaluation of foot pain. The pain started 2 years ago following ulcers and symptoms have been worsening.    Brief history: History of scleroderma    Pain Description:    The pain is located in the both feet in the area of slowly healing chronic foot ulcers which were partly from venous insufficiency and stasis associated with her scleroderma.  In her right lower extremity she describes radicular symptoms in the L4/5 distribution.    At BEST  5/10     At WORST  10/10 on the WORST day.      On average pain is rated as 8/10.     Today the pain is rated as 8/10    The pain is described as burning, sharp, shooting and constant       Symptoms interfere with daily activity, sleeping and work.     Exacerbating factors: Laying, Walking, Night Time, Morning and Getting out of bed/chair.      Mitigating factors medications.     Patient denies night fever/night sweats, urinary incontinence, bowel incontinence, significant weight loss, significant motor weakness and loss of sensations.  Patient denies any suicidal or homicidal  ideations    Pain Medications:  Current:  Lyrica 300 mg daily  Zanaflex  Relafen  Tylenol #3    Tried in Past:  NSAIDs -Aleve, Mobic, Celebrex  TCA -Never  SNRI -Never  Anti-convulsants - Gabapentin, Lyrica  Muscle Relaxants -Never  Opioids-Tramadol    Physical Therapy/Home Exercise: no       report:  Reviewed and consistent with medication use as prescribed.    Pain Procedures: none    Chiropractor -never  Acupuncture - never  TENS unit -never  Spinal decompression -never  Joint replacement -never    Imaging:   Xray Cervical Spine 12/6/2019:  FINDINGS:  Odontoid prevertebral soft tissues and posterior elements are intact.  Neural foramina are patent.  No fracture dislocation bone destruction seen.  There is mild DJD.     Impression:     Mild DJD.    X-ray lumbar spine 6/1/2016:  2 views: Alignment is normal. There is mild DJD. No fracture dislocation bone destruction seen.   Impression      Mild DJD.     Xray lumbar spine 4/2018:  COMPARISON:  June 2016    FINDINGS:  There is slight curvature of the lumbar spine.  The vertebral bodies are normally aligned and normal in height.  Mild disc space narrowing present at L5-S1.  There is mild facet degenerative change in the lower spine.  No significant osteophytic spurring present.  There is no change in alignment with flexion or extension.      Past Medical History:   Diagnosis Date    Abnormal Pap smear     Acid reflux     Allergy     Arthritis     Encounter for blood transfusion     GERD (gastroesophageal reflux disease)     Hiatal hernia 03/24/2023    History of migraine headaches     Hx of colonic polyp     Hypertension     Idiopathic neuropathy 07/20/2012    ILD (interstitial lung disease) 11/06/2013    Iron deficiency anemia 03/18/2014    MRSA carrier     Osteopenia     Pneumonia     Pulmonary fibrosis     Pulmonary hypertension     Raynaud's disease     Scleroderma, diffuse     Sjogren's syndrome     Vitamin D deficiency 11/14/2013     Past Surgical History:    Procedure Laterality Date    24 HOUR IMPEDANCE PH MONITORING OF ESOPHAGUS IN PATIENT NOT TAKING ACID REDUCING MEDICATIONS N/A 03/04/2020    Procedure: IMPEDANCE PH STUDY, ESOPHAGEAL, 24 HOUR, IN PATIENT NOT TAKING ACID REDUCING MEDICATION;  Surgeon: Annamaria Mendoza MD;  Location: [facility] (4TH FLR);  Service: Endoscopy;  Laterality: N/A;  OFF PPI/H2 Blocker   Motility Studies   Hold Narcotics x 1 days   Hold TCA x 1 days  2/26 - LVM attempting to confirm appt  2/27 - Confirmed appt    ANORECTAL MANOMETRY N/A 05/10/2021    Procedure: MANOMETRY, ANORECTAL with balloon expulsion test;  Surgeon: Annamaria Mendoza MD;  Location: [facility] ENDO (4TH FLR);  Service: Endoscopy;  Laterality: N/A;  order combined  covid test 5/7 [facility], instructions emailed-[facility]    BREAST BIOPSY      Left, benign    BRONCHOSCOPY N/A 10/2/2023    Procedure: bronch;  Surgeon: Roberta Leigh DO;  Location: [facility] OR 2ND FLR;  Service: Endoscopy;  Laterality: N/A;    BRONCHOSCOPY N/A 9/16/2024    Procedure: Flexible bronchoscopy (BAL only);  Surgeon: Roberta Leigh DO;  Location: [facility] OR 2ND FLR;  Service: Endoscopy;  Laterality: N/A;    CERVICAL CONIZATION   W/ LASER  1970    COLONOSCOPY      COLONOSCOPY N/A 03/29/2019    Procedure: COLONOSCOPY;  Surgeon: Annamaria Mendoza MD;  Location: [facility] (2ND FLR);  Service: Endoscopy;  Laterality: N/A;    COLONOSCOPY N/A 05/10/2021    Procedure: COLONOSCOPY;  Surgeon: Annamaria Mendoza MD;  Location: [facility] (4TH FLR);  Service: Endoscopy;  Laterality: N/A;  2nd floor-previous scopes done on 2nd floor, gastroparesis  full liquid diet x2 days, clear liquid x1 day prior to procedure  covid test 5/7 [employer], instructions emailed-[facility]  5/6 pt confirmed [id number]    DILATION AND CURETTAGE OF UTERUS      ESOPHAGEAL MANOMETRY WITH MEASUREMENT OF IMPEDANCE N/A 03/04/2020    Procedure: MANOMETRY, ESOPHAGUS, WITH IMPEDANCE MEASUREMENT;  Surgeon: Annamaria Mendoza MD;  Location: [facility] (4TH FLR);   Service: Endoscopy;  Laterality: N/A;  OFF PPI/H2 Blocker   Motility Studies   Hold Narcotics x 1 days   Hold TCA x 1 days    ESOPHAGOGASTRODUODENOSCOPY      ESOPHAGOGASTRODUODENOSCOPY N/A 03/29/2019    Procedure: EGD (ESOPHAGOGASTRODUODENOSCOPY);  Surgeon: Annamaria Mendoza MD;  Location: [facility] (2ND FLR);  Service: Endoscopy;  Laterality: N/A;  pulmonary htn    ESOPHAGOGASTRODUODENOSCOPY N/A 02/02/2021    Procedure: ESOPHAGOGASTRODUODENOSCOPY (EGD);  Surgeon: Annamaria Mendoza MD;  Location: [facility] ENDO (2ND FLR);  Service: Endoscopy;  Laterality: N/A;  2nd floor gastroparesis  3 days full liquid diet and 1 day clears  covid test 1/30 primary care, instructions sent to [facility]    ESOPHAGOGASTRODUODENOSCOPY N/A 07/01/2022    Procedure: ESOPHAGOGASTRODUODENOSCOPY (EGD);  Surgeon: Annamaria Mendoza MD;  Location: [facility] (2ND FLR);  Service: Endoscopy;  Laterality: N/A;  2nd floor-gastroparesis  full liquid diet x3 days, clear liquid diet x1 day prior to procedure  fully vaccinated, [facility]  [facility]    EXCISION, LESION, STOMACH, LAPAROSCOPIC N/A 03/23/2023    Procedure: XI ROBOTIC EXCISION, LESION, STOMACH;  Surgeon: Katherine Segura MD;  Location: [facility] OR [address] [place] [address];  Service: General;  Laterality: N/A;    EXCISIONAL BIOPSY, LYMPH NODE Right 05/26/2023    Procedure: EXCISIONAL BIOPSY, LYMPH NODE, INGUINAL;  Surgeon: Katherine Segura MD;  Location: [facility] OR [address] [place] [address];  Service: General;  Laterality: Right;    HYSTERECTOMY  1990    FRANDY (AUB, Fibroids), ovaries remain    REPAIR, HERNIA, UMBILICAL N/A 03/23/2023    Procedure: REPAIR, HERNIA, UMBILICAL;  Surgeon: Katherine Segura MD;  Location: [facility] OR [address] [place] [address];  Service: General;  Laterality: N/A;    RIGHT HEART CATHETERIZATION Right 01/05/2021    Procedure: INSERTION, CATHETER, RIGHT HEART;  Surgeon: Aureliano Sy MD;  Location: [facility] CATH LAB;  Service: Cardiology;   Laterality: Right;    RIGHT HEART CATHETERIZATION Right 03/16/2023    Procedure: INSERTION, CATHETER, RIGHT HEART;  Surgeon: Aureliano Sy MD;  Location: [facility] CATH LAB;  Service: Cardiology;  Laterality: Right;    RIGHT HEART CATHETERIZATION Right 11/22/2024    Procedure: INSERTION, CATHETER, RIGHT HEART;  Surgeon: Tamanna Martins MD;  Location: [facility] CATH LAB;  Service: Cardiology;  Laterality: Right;    ROBOT-ASSISTED LAPAROSCOPIC REPAIR OF INGUINAL HERNIA USING DA VERNELL XI Right 05/26/2023    Procedure: XI ROBOTIC REPAIR, HERNIA, INGUINAL, FEMORAL;  Surgeon: Katherine Segura MD;  [facility];  Service: General;  Laterality: Right;    ROBOT-ASSISTED REPAIR OF HIATAL HERNIA USING DA VERNELL XI N/A 03/23/2023    Procedure: XI ROBOTIC REPAIR, HERNIA, HIATAL;  Surgeon: Katherine Segura MD;  [facility];  Service: General;  Laterality: N/A;    VARICOSE VEIN SURGERY      XI ROBOTIC GASTROPEXY N/A 03/23/2023    Procedure: XI ROBOTIC GASTROPEXY;  Surgeon: Katherine Segura MD;  [facility];  Service: General;  Laterality: N/A;    XI ROBOTIC PYLOROMYOTOMY N/A 03/23/2023    Procedure: XI ROBOTIC PYLOROMYOTOMY;  Surgeon: Katherine Segura MD;  [facility];  Service: General;  Laterality: N/A;     Social History     Socioeconomic History    Marital status:      Spouse name: Lewis    Number of children: 4   Occupational History    Occupation: -retired     Employer: [employer]     Comment[place]     Employer: RETIRED   Tobacco Use    Smoking status: Never     Passive exposure: Never    Smokeless tobacco: Never   Substance and Sexual Activity    Alcohol use: Not Currently    Drug use: No    Sexual activity: Yes     Partners: Male     Birth control/protection: Post-menopausal, None, See Surgical Hx     Comment: Hysterectomy   Other Topics Concern    Are you pregnant or think you may be? No     Breast-feeding No   Social History Narrative         Social Drivers of Health     Financial Resource Strain: Low Risk  (3/26/2024)    Overall Financial Resource Strain (CARDIA)     Difficulty of Paying Living Expenses: Not hard at all   Food Insecurity: No Food Insecurity (3/26/2024)    Hunger Vital Sign     Worried About Running Out of Food in the Last Year: Never true     Ran Out of Food in the Last Year: Never true   Transportation Needs: No Transportation Needs (3/26/2024)    PRAPARE - Transportation     Lack of Transportation (Medical): No     Lack of Transportation (Non-Medical): No   Physical Activity: Insufficiently Active (3/26/2024)    Exercise Vital Sign     Days of Exercise per Week: 1 day     Minutes of Exercise per Session: 10 min   Stress: No Stress Concern Present (3/26/2024)    Citizen of the [place] of Occupational Health - Occupational Stress Questionnaire     Feeling of Stress : Only a little   Housing Stability: Low Risk  (3/26/2024)    Housing Stability Vital Sign     Unable to Pay for Housing in the Last Year: No     Number of Places Lived in the Last Year: 1     Unstable Housing in the Last Year: No     Family History   Problem Relation Name Age of Onset    Breast cancer Mother Tiki Singh     Cancer Mother Tiki Singh         Breast    Hypertension Father Madi     Diabetes Father Madi         Amputation of legs    Breast cancer Sister Brenda     Diabetes Sister Brenda     Arthritis Sister Brenda     Cancer Sister Brenda         Breast    Osteoarthritis Brother Luis     Diabetes Brother Luis     Arthritis Brother Luis     Birth defects Brother Luis         Polio at birth, recently had knee replacement surgery on left knee    No Known Problems Daughter      No Known Problems Daughter      No Known Problems Son      No Known Problems Son      Breast cancer Maternal Aunt Gege     Cancer Maternal Aunt Gege         Breast    Early death Sister Philomena     Melanoma Neg Hx      Colon  cancer Neg Hx      Crohn's disease Neg Hx      Stomach cancer Neg Hx      Ulcerative colitis Neg Hx      Rectal cancer Neg Hx      Irritable bowel syndrome Neg Hx      Esophageal cancer Neg Hx      Celiac disease Neg Hx      Ovarian cancer Neg Hx      Liver cancer Neg Hx      Pancreatic cancer Neg Hx         Review of patient's allergies indicates:   Allergen Reactions    Sulfa (sulfonamide antibiotics)      Other reaction(s): Rash       Current Outpatient Medications   Medication Sig    acetaminophen-codeine 300-60mg (TYLENOL #4) 300-60 mg Tab Take 1 tablet by mouth every evening.    albuterol (VENTOLIN HFA) 90 mcg/actuation inhaler Inhale 2 puffs into the lungs every 6 (six) hours as needed for Wheezing. Rescue    amitriptyline (ELAVIL) 10 MG tablet Take 1 tablet (10 mg total) by mouth every evening.    ammonium lactate 12 % Crea Apply 20 g topically once daily.    amoxicillin-pot clavulanate 250-62.5 mg/5ml (AUGMENTIN) 250-62.5 mg/5 mL suspension Take 5 mLs (250 mg total) by mouth once daily. Discard remainder after 10 days    DULoxetine (CYMBALTA) 60 MG capsule Take 1 capsule (60 mg total) by mouth once daily.    ergocalciferol (ERGOCALCIFEROL) 50,000 unit Cap Take 1 capsule (50,000 Units total) by mouth every 7 days.    furosemide (LASIX) 20 MG tablet Take 1 tablet (20 mg total) by mouth once daily.    levoFLOXacin (LEVAQUIN) 250 mg/10 mL Soln Take 20 mLs (500 mg total) by mouth once daily.    multivitamin capsule Take 1 capsule by mouth once daily.    mupirocin (BACTROBAN) 2 % ointment Apply topically once daily.    mycophenolate mofetil (CELLCEPT) 200 mg/mL SusR Take 5 mLs (1,000 mg total) by mouth 2 (two) times daily.    nabumetone (RELAFEN) 750 MG tablet Take 1 tablet (750 mg total) by mouth daily as needed for Pain.    nebulizer and compressor Rosemary Use as directed    NIFEdipine (ADALAT CC) 90 MG TbSR TAKE 1 TABLET BY MOUTH EVERY EVENING WITH 30 MG FOR A TOTAL OF 120MG    NIFEdipine (PROCARDIA-XL) 30 MG  (OSM) 24 hr tablet TAKE 1 TABLET(30 MG) BY MOUTH EVERY DAY    NIFEdipine (PROCARDIA-XL) 30 MG (OSM) 24 hr tablet Take 1 tablet (30 mg total) by mouth once daily.    NIFEdipine (PROCARDIA-XL) 30 MG (OSM) 24 hr tablet Take 1 tablet (30 mg total) by mouth once daily.    nintedanib (OFEV) 100 mg Cap Take 100 mg by mouth 2 (two) times a day.    pravastatin (PRAVACHOL) 20 MG tablet Take 1 tablet (20 mg total) by mouth every evening.    pregabalin (LYRICA) 300 MG Cap Take 1 capsule (300 mg total) by mouth 2 (two) times daily.    PREVIDENT 5000 BOOSTER PLUS 1.1 % Pste SMARTSIG:To Teeth    RABEprazole (ACIPHEX) 20 mg tablet Take 1 tablet (20 mg total) by mouth 2 (two) times daily.    tadalafil (ADCIRCA) 20 mg Tab Take 2 tablets (40 mg total) by mouth once daily.    tiZANidine (ZANAFLEX) 4 MG tablet Take 2 tablets (8 mg total) by mouth 2 (two) times daily as needed (muscle pain).    vitamin E Crea Apply 20 g topically once a week.    PREVIDENT 5000 SENSITIVE 1.1-5 % Pste BRUSH FOR 2 MINUTES TWICE PER DAY     No current facility-administered medications for this visit.     Facility-Administered Medications Ordered in Other Visits   Medication    fentaNYL 50 mcg/mL injection 25 mcg    haloperidol lactate injection 0.5 mg    sodium chloride 0.9% flush 10 mL       REVIEW OF SYSTEMS:    GENERAL:  No weight loss, malaise or fevers.  HEENT:   No recent changes in vision or hearing  NECK:  Negative for lumps,  difficulty with swallowing associated with scleroderma.  RESPIRATORY:  Negative for cough, wheezing or shortness of breath, patient denies any recent URI. Albuterol (history of ILD)  CARDIOVASCULAR:  Negative for chest pain, leg swelling or palpitations.  GI:  Negative for abdominal discomfort, blood in stools or black stools or change in bowel habits. GERD controlled zantac  MUSCULOSKELETAL:  See HPI.  SKIN:   Chronic low healing venous stasis ulcerations to bilateral lower extremities   PSYCH:  No mood disorder or recent  psychosocial stressors.  Patients sleep is not disturbed secondary to pain.  HEMATOLOGY/LYMPHOLOGY:   History of iron deficiency anemia, takes daily iron supplementation.    ENDO: No history of diabetes or thyroid dysfunction  NEURO:   No history of headaches, syncope, paralysis, seizures or tremors.  All other reviewed and negative other than HPI.    OBJECTIVE:      BP (!) 112/59 (Patient Position: Sitting)   Pulse 83   Temp 97.7 °F (36.5 °C)   Wt 62.4 kg (137 lb 9.1 oz)   BMI 22.89 kg/m²     PHYSICAL EXAMINATION:    GENERAL: Well appearing, in no acute distress, alert and oriented x3.  PSYCH:  Mood and affect appropriate.  SKIN: Tight skin associated with scleroderma. Wearing shoes today.   HEAD/FACE:  Normocephalic, atraumatic. Cranial nerves grossly intact.  CV: RRR with palpation of the radial artery.  PULM: No evidence of respiratory difficulty, symmetric chest rise.  EXTREMITIES: Contractures over on bilateral hands with ulcerations to knuckles.   MUSCULOSKELETAL:   Bilateral lower extremity strength testing limited secondary to pain in the feet.    NEURO:  Decreased sensation to BLE.   GAIT: Antalgic, ambulates without assistance       ASSESSMENT: 74 y.o. year old female with pain, consistent with the following:    Encounter Diagnoses   Name Primary?    Chronic pain disorder Yes    Cutaneous scleroderma     Idiopathic neuropathy     Myofascial pain     Encounter for monitoring opioid maintenance therapy          PLAN:     - Previous records and imaging reviewed.  Labs reviewed.     - Continue Lyrica 300 mg BID.     - Continue Relafen.     - Continue Zanaflex 8 mg at bedtime.     - Pain Contract signed 8/26/2022.     - Continue Tylenol #3 once daily PRN. Refill provided.    - The patient is here today for a refill of current pain medications and they believe these provide effective pain control and improvements in quality of life.  The patient notes no serious side effects, and feels the benefits outweigh  the risks.  The patient was reminded of the pain contract that they signed previously as well as the risks and benefits of the medication including possible death.  The updated [place] Board  Pharmacy prescription monitoring program was reviewed, and the patient has been found to be compliant with current treatment plan. Medication management provided by Dr. Hinson.     - UDS on 7/30/2024 reviewed and consistent. Repeat UDS today.     - Continue home exercise routine.     - RTC in 3 months or sooner if needed.    The above plan and management options were discussed at length with patient. Patient is in agreement with the above and verbalized understanding.     Viktoriya Camara NP  02/04/2025

## 2025-02-07 ENCOUNTER — OFFICE VISIT (OUTPATIENT)
Dept: PODIATRY | Facility: CLINIC | Age: 75
End: 2025-02-07
Payer: MEDICARE

## 2025-02-07 VITALS
DIASTOLIC BLOOD PRESSURE: 54 MMHG | SYSTOLIC BLOOD PRESSURE: 93 MMHG | HEART RATE: 59 BPM | HEIGHT: 65 IN | BODY MASS INDEX: 22.89 KG/M2

## 2025-02-07 DIAGNOSIS — L97.922 ULCERS OF BOTH LOWER EXTREMITIES WITH FAT LAYER EXPOSED: Primary | ICD-10-CM

## 2025-02-07 DIAGNOSIS — M34.9 SCLERODERMA: ICD-10-CM

## 2025-02-07 DIAGNOSIS — L97.912 ULCERS OF BOTH LOWER EXTREMITIES WITH FAT LAYER EXPOSED: Primary | ICD-10-CM

## 2025-02-07 DIAGNOSIS — L03.116 CELLULITIS OF LEFT LOWER EXTREMITY: ICD-10-CM

## 2025-02-07 PROCEDURE — 99999 PR PBB SHADOW E&M-EST. PATIENT-LVL IV: CPT | Mod: PBBFAC,,, | Performed by: STUDENT IN AN ORGANIZED HEALTH CARE EDUCATION/TRAINING PROGRAM

## 2025-02-07 PROCEDURE — 99214 OFFICE O/P EST MOD 30 MIN: CPT | Mod: PBBFAC | Performed by: STUDENT IN AN ORGANIZED HEALTH CARE EDUCATION/TRAINING PROGRAM

## 2025-02-07 PROCEDURE — 99214 OFFICE O/P EST MOD 30 MIN: CPT | Mod: S$PBB,,, | Performed by: STUDENT IN AN ORGANIZED HEALTH CARE EDUCATION/TRAINING PROGRAM

## 2025-02-07 NOTE — PROGRESS NOTES
Subjective:     Patient    Yamel Shah is a 74 y.o. female.    Problem    2024: Previously seen by multiple wound care providers without significant improvement in wounds, has bounced around a lot. Has bilateral foot wounds related to scleroderma and immunosuppressant medications. Had improvement in granulation with regular wound care, oral and topical antibiotics, one debridement. Attempted topical vitamin E but caused burning.     01/03/25: Returns for bilateral foot wound care. No new concerns. Had pain flare on left foot, still sensitive.     01/08/25: Returns for bilateral foot wound care. No new concerns.     01/16/25: Returns for bilateral foot wound care. Awaiting appointment with ID 02/14. No new concerns.     01/24/25: Returns for bilateral foot wound care. Pain controlled on current medications. No new concerns.     01/31/25: Returns for bilateral foot wound care. Not tolerating amitriptyline well; still with nerve pain, tingling, cramping, burning despite duloxetine, tolerates duloxetine well.     02/07/25: Returns for bilateral foot wound care. Increased duloxetine at last visit for BLE nerve pain with significant improvement in pain without side effects. ID appointment rescheduled to 02/24.       History    History obtained from patient and review of medical records.     Past Medical History:   Diagnosis Date    Abnormal Pap smear     Acid reflux     Allergy     Arthritis     Encounter for blood transfusion     GERD (gastroesophageal reflux disease)     Hiatal hernia 03/24/2023    History of migraine headaches     Hx of colonic polyp     Hypertension     Idiopathic neuropathy 07/20/2012    ILD (interstitial lung disease) 11/06/2013    Iron deficiency anemia 03/18/2014    MRSA carrier     Osteopenia     Pneumonia     Pulmonary fibrosis     Pulmonary hypertension     Raynaud's disease     Scleroderma, diffuse     Sjogren's syndrome     Vitamin D deficiency 11/14/2013       Past  Surgical History:   Procedure Laterality Date    24 HOUR IMPEDANCE PH MONITORING OF ESOPHAGUS IN PATIENT NOT TAKING ACID REDUCING MEDICATIONS N/A 03/04/2020    Procedure: IMPEDANCE PH STUDY, ESOPHAGEAL, 24 HOUR, IN PATIENT NOT TAKING ACID REDUCING MEDICATION;  Surgeon: Annamaria Mendoza MD;  Location: [facility] (4TH FLR);  Service: Endoscopy;  Laterality: N/A;  OFF PPI/H2 Blocker   Motility Studies   Hold Narcotics x 1 days   Hold TCA x 1 days  2/26 - LVM attempting to confirm appt  2/27 - Confirmed appt    ANORECTAL MANOMETRY N/A 05/10/2021    Procedure: MANOMETRY, ANORECTAL with balloon expulsion test;  Surgeon: Annamaria Mendoza MD;  Location: [facility] (4TH FLR);  Service: Endoscopy;  Laterality: N/A;  order combined  covid test 5/7 [facility], instructions emailed-[facility]    BREAST BIOPSY      Left, benign    BRONCHOSCOPY N/A 10/2/2023    Procedure: bronch;  Surgeon: Roberta Leigh DO;  [facility];  Service: Endoscopy;  Laterality: N/A;    BRONCHOSCOPY N/A 9/16/2024    Procedure: Flexible bronchoscopy (BAL only);  Surgeon: Roberta Leigh DO;  [facility];  Service: Endoscopy;  Laterality: N/A;    CERVICAL CONIZATION   W/ LASER  1970    COLONOSCOPY      COLONOSCOPY N/A 03/29/2019    Procedure: COLONOSCOPY;  Surgeon: Annamaria Mendoza MD;  Location: [facility] (2ND FLR);  Service: Endoscopy;  Laterality: N/A;    COLONOSCOPY N/A 05/10/2021    Procedure: COLONOSCOPY;  Surgeon: Annamaria Mendoza MD;  Location: [facility] (4TH FLR);  Service: Endoscopy;  Laterality: N/A;  2nd floor-previous scopes done on 2nd floor, gastroparesis  full liquid diet x2 days, clear liquid x1 day prior to procedure  covid test 5/7 [facility], instructions emailed-[facility]  5/6 pt confirmed appt-[facility]    DILATION AND CURETTAGE OF UTERUS      ESOPHAGEAL MANOMETRY WITH MEASUREMENT OF IMPEDANCE N/A 03/04/2020    Procedure: MANOMETRY, ESOPHAGUS, WITH IMPEDANCE MEASUREMENT;  Surgeon: Annamaria Mendoza MD;  Location:  [facility] ENDO (4TH FLR);  Service: Endoscopy;  Laterality: N/A;  OFF PPI/H2 Blocker   Motility Studies   Hold Narcotics x 1 days   Hold TCA x 1 days    ESOPHAGOGASTRODUODENOSCOPY      ESOPHAGOGASTRODUODENOSCOPY N/A 03/29/2019    Procedure: EGD (ESOPHAGOGASTRODUODENOSCOPY);  Surgeon: Annamaria Mendoza MD;  Location: [facility] ENDO (2ND FLR);  Service: Endoscopy;  Laterality: N/A;  pulmonary htn    ESOPHAGOGASTRODUODENOSCOPY N/A 02/02/2021    Procedure: ESOPHAGOGASTRODUODENOSCOPY (EGD);  Surgeon: Annamaria Mendoza MD;  Location: [facility] ENDO (2ND FLR);  Service: Endoscopy;  Laterality: N/A;  2nd floor gastroparesis  3 days full liquid diet and 1 day clears  covid test 1/30 primary care, instructions sent to [facility]    ESOPHAGOGASTRODUODENOSCOPY N/A 07/01/2022    Procedure: ESOPHAGOGASTRODUODENOSCOPY (EGD);  Surgeon: Annamaria Mendoza MD;  Location: [facility] (2ND FLR);  Service: Endoscopy;  Laterality: N/A;  2nd floor-gastroparesis  full liquid diet x3 days, clear liquid diet x1 day prior to procedure  fully vaccinated, instructions sent to [facility]  6/29-pt confirmed new arrival time-[facility]    EXCISION, LESION, STOMACH, LAPAROSCOPIC N/A 03/23/2023    Procedure: XI ROBOTIC EXCISION, LESION, STOMACH;  Surgeon: Katherine Segura MD;  [facility];  Service: General;  Laterality: N/A;    EXCISIONAL BIOPSY, LYMPH NODE Right 05/26/2023    Procedure: EXCISIONAL BIOPSY, LYMPH NODE, INGUINAL;  Surgeon: Katherine Segura MD;  [facility];  Service: General;  Laterality: Right;    HYSTERECTOMY  1990    FRANDY (AUB, Fibroids), ovaries remain    REPAIR, HERNIA, UMBILICAL N/A 03/23/2023    Procedure: REPAIR, HERNIA, UMBILICAL;  Surgeon: Katherine Segura MD;  [facility];  Service: General;  Laterality: N/A;    RIGHT HEART CATHETERIZATION Right 01/05/2021    Procedure: INSERTION, CATHETER, RIGHT HEART;  Surgeon: Aureliano Sy MD;  Location: [facility] CATH LAB;  Service:  Cardiology;  Laterality: Right;    RIGHT HEART CATHETERIZATION Right 03/16/2023    Procedure: INSERTION, CATHETER, RIGHT HEART;  Surgeon: Aureliano Sy MD;  Location: [facility] CATH LAB;  Service: Cardiology;  Laterality: Right;    RIGHT HEART CATHETERIZATION Right 11/22/2024    Procedure: INSERTION, CATHETER, RIGHT HEART;  Surgeon: Tamanna Martins MD;  Location: [facility] CATH LAB;  Service: Cardiology;  Laterality: Right;    ROBOT-ASSISTED LAPAROSCOPIC REPAIR OF INGUINAL HERNIA USING DA VERNELL XI Right 05/26/2023    Procedure: XI ROBOTIC REPAIR, HERNIA, INGUINAL, FEMORAL;  Surgeon: Katherine Segura MD;  [facility];  Service: General;  Laterality: Right;    ROBOT-ASSISTED REPAIR OF HIATAL HERNIA USING DA VERNELL XI N/A 03/23/2023    Procedure: XI ROBOTIC REPAIR, HERNIA, HIATAL;  Surgeon: Katherine Segura MD;  Location: [facility] OR [facility] FLR;  Service: General;  Laterality: N/A;    VARICOSE VEIN SURGERY      XI ROBOTIC GASTROPEXY N/A 03/23/2023    Procedure: XI ROBOTIC GASTROPEXY;  Surgeon: Katherine Segura MD;  [facility];  Service: General;  Laterality: N/A;    XI ROBOTIC PYLOROMYOTOMY N/A 03/23/2023    Procedure: XI ROBOTIC PYLOROMYOTOMY;  Surgeon: Katherine Segura MD;  Location: [facility] OR [facility] FLR;  Service: General;  Laterality: N/A;        Objective:     Vitals  Wt Readings from Last 1 Encounters:   02/04/25 62.4 kg (137 lb 9.1 oz)     Temp Readings from Last 1 Encounters:   02/04/25 97.7 °F (36.5 °C)     BP Readings from Last 1 Encounters:   02/07/25 (!) 93/54     Pulse Readings from Last 1 Encounters:   02/07/25 (!) 59       Dermatological Exam    Skin:  Skin of both feet thickened (scleroderma), stiff, with extensive ulcerations of both feet down to fat with slowly improving granulation; xerosis       Nails:  10 nail(s) thickened, and 10 nail(s) discolored    Vascular Exam    Arteries:  Posterior tibial artery palpable on right  Dorsalis pedis artery  palpable on right  Posterior tibial artery palpable on left  Dorsalis pedis artery palpable on left    Veins:  Superficial veins unremarkable on right  Superficial veins unremarkable on left    Swellin+ nonpitting on right  1+ nonpitting on left    Neurological Exam    Sherrill touch test:  6/6 sites sensed, light touch intact     Musculoskeletal Exam    Footwear:  Casual on right  Casual on left    Gait Exam:   Ambulatory Status: Ambulatory  Gait: Normal  Assistive Devices: None    Foot Progression Angle:  Normal on right  Normal on left    Right Lower Extremity Additional Findings:  Right foot and ankle function, strength, and range of motion unremarkable except as noted above.     Left Lower Extremity Additional Findings:  Left foot and ankle function, strength, and range of motion unremarkable except as noted above.    Imaging and Other Tests    Imaging:  Independently reviewed and interpreted imaging, findings are as follows: N/A     Assessment:     Encounter Diagnoses   Name Primary?    Ulcers of both lower extremities with fat layer exposed Yes    Scleroderma     Cellulitis of left lower extremity               Plan:     I counseled the patient on her conditions, their implications and medical management.    Neuropathy: chronic stable  -Stop amitriptyline.   -Continue duloxetine 120 mg/day.      Bilateral ulcers, cellulitis, swelling: chronic stable   -Dressings changed. Leave intact as long as possible then continue usual dressing changes.   -Protected WB in surgical shoes.    -Establish care with ID as scheduled.     Scleroderma: chronic stable  -Continue PO tadalafil 40 mg/day.      I spent a total of 30 minutes on the day of the visit.  This includes face to face time and non-face to face time preparing to see the patient (eg, review of tests), obtaining and/or reviewing separately obtained history, documenting clinical information in the electronic or other health record, independently interpreting  results and communicating results to the patient/family/caregiver, or care coordinator.     Return to clinic in 1-2 weeks, call sooner PRN.

## 2025-02-09 LAB
1OH-MIDAZOLAM UR QL SCN: NOT DETECTED
6MAM UR QL: NOT DETECTED
7AMINOCLONAZEPAM UR QL: NOT DETECTED
A-OH ALPRAZ UR QL: NOT DETECTED
ALPRAZ UR QL: NOT DETECTED
AMPHET UR QL SCN: NOT DETECTED
ANNOTATION COMMENT IMP: NORMAL
BARBITURATES UR QL: NEGATIVE
BUPRENORPHINE UR QL: NOT DETECTED
BZE UR QL: NEGATIVE
CARBOXYTHC UR QL: NEGATIVE
CARISOPRODOL UR QL: NEGATIVE
CLONAZEPAM UR QL: NOT DETECTED
CODEINE UR QL: PRESENT
CREAT UR-MCNC: 135.4 MG/DL (ref 20–400)
DIAZEPAM UR QL: NOT DETECTED
ETHYL GLUCURONIDE UR QL: NEGATIVE
FENTANYL UR QL: NOT DETECTED
GABAPENTIN UR QL CFM: NOT DETECTED
HYDROCODONE UR QL: PRESENT
HYDROMORPHONE UR QL: PRESENT
LORAZEPAM UR QL: NOT DETECTED
MDA UR QL: NOT DETECTED
MDEA UR QL: NOT DETECTED
MDMA UR QL: NOT DETECTED
ME-PHENIDATE UR QL: NOT DETECTED
METHADONE UR QL: NEGATIVE
METHAMPHET UR QL: NOT DETECTED
MIDAZOLAM UR QL SCN: NOT DETECTED
MORPHINE UR QL: PRESENT
NALOXONE UR QL CFM: NOT DETECTED
NORBUPRENORPHINE UR QL CFM: NOT DETECTED
NORDIAZEPAM UR QL: NOT DETECTED
NORFENTANYL UR QL: NOT DETECTED
NORHYDROCODONE UR QL CFM: PRESENT
NORMEPERIDINE UR QL CFM: NOT DETECTED
NOROXYCODONE UR QL CFM: NOT DETECTED
NOROXYMORPHONE UR QL SCN: NOT DETECTED
OXAZEPAM UR QL: NOT DETECTED
OXYCODONE UR QL: NOT DETECTED
OXYMORPHONE UR QL: NOT DETECTED
PATHOLOGY STUDY: NORMAL
PCP UR QL: NEGATIVE
PHENTERMINE UR QL: NOT DETECTED
PREGABALIN UR QL CFM: PRESENT
SERVICE CMNT-IMP: NORMAL
TAPENTADOL UR QL SCN: NOT DETECTED
TAPENTADOL UR QL SCN: NOT DETECTED
TEMAZEPAM UR QL: NOT DETECTED
TRAMADOL UR QL: NEGATIVE
ZOLPIDEM PHENYL-4-CARB UR QL SCN: NOT DETECTED
ZOLPIDEM UR QL: NOT DETECTED

## 2025-02-11 DIAGNOSIS — E55.9 VITAMIN D DEFICIENCY: ICD-10-CM

## 2025-02-11 RX ORDER — ERGOCALCIFEROL 1.25 MG/1
50000 CAPSULE ORAL
Qty: 12 CAPSULE | Refills: 1 | Status: SHIPPED | OUTPATIENT
Start: 2025-02-11

## 2025-02-11 NOTE — TELEPHONE ENCOUNTER
Care Due:                  Date            Visit Type   Department     Provider  --------------------------------------------------------------------------------                                EP -                              PRIMARY      [facility] INTERNAL  Last Visit: 10-      CARE ([facility])   MEDICINE       Alypauline Rand                              EP -                              PRIMARY      [facility] INTERNAL  Next Visit: 04-      [facility] ([facility])   MEDICINE       Aly A  Giorgi                                                            Last  Test          Frequency    Reason                     Performed    Due Date  --------------------------------------------------------------------------------    Vitamin D...  12 months..  ergocalciferol...........  10-   10-    [employer] Embedded Care Due Messages. Reference number: [id number].   2/11/2025 2:28:36 PM CST

## 2025-02-14 ENCOUNTER — OFFICE VISIT (OUTPATIENT)
Dept: PODIATRY | Facility: CLINIC | Age: 75
End: 2025-02-14
Payer: MEDICARE

## 2025-02-14 ENCOUNTER — TELEPHONE (OUTPATIENT)
Dept: PODIATRY | Facility: CLINIC | Age: 75
End: 2025-02-14

## 2025-02-14 ENCOUNTER — TELEPHONE (OUTPATIENT)
Dept: TRANSPLANT | Facility: CLINIC | Age: 75
End: 2025-02-14
Payer: MEDICARE

## 2025-02-14 VITALS
DIASTOLIC BLOOD PRESSURE: 57 MMHG | HEART RATE: 68 BPM | SYSTOLIC BLOOD PRESSURE: 129 MMHG | BODY MASS INDEX: 22.89 KG/M2 | HEIGHT: 65 IN

## 2025-02-14 DIAGNOSIS — L97.922 ULCERS OF BOTH LOWER EXTREMITIES WITH FAT LAYER EXPOSED: ICD-10-CM

## 2025-02-14 DIAGNOSIS — L97.912 ULCERS OF BOTH LOWER EXTREMITIES WITH FAT LAYER EXPOSED: ICD-10-CM

## 2025-02-14 DIAGNOSIS — L03.119 CELLULITIS OF LOWER EXTREMITY, UNSPECIFIED LATERALITY: ICD-10-CM

## 2025-02-14 PROCEDURE — 99999 PR PBB SHADOW E&M-EST. PATIENT-LVL IV: CPT | Mod: PBBFAC,,, | Performed by: STUDENT IN AN ORGANIZED HEALTH CARE EDUCATION/TRAINING PROGRAM

## 2025-02-14 PROCEDURE — 99214 OFFICE O/P EST MOD 30 MIN: CPT | Mod: PBBFAC | Performed by: STUDENT IN AN ORGANIZED HEALTH CARE EDUCATION/TRAINING PROGRAM

## 2025-02-14 RX ORDER — LEVOFLOXACIN 25 MG/ML
500 SOLUTION ORAL DAILY
Qty: 200 ML | Refills: 0 | Status: SHIPPED | OUTPATIENT
Start: 2025-02-14

## 2025-02-14 RX ORDER — MUPIROCIN 20 MG/G
OINTMENT TOPICAL DAILY
Qty: 22 G | Refills: 3 | Status: SHIPPED | OUTPATIENT
Start: 2025-02-14

## 2025-02-14 RX ORDER — AMOXICILLIN AND CLAVULANATE POTASSIUM 250; 62.5 MG/5ML; MG/5ML
250 POWDER, FOR SUSPENSION ORAL DAILY
Qty: 150 ML | Refills: 0 | Status: SHIPPED | OUTPATIENT
Start: 2025-02-14

## 2025-02-14 NOTE — TELEPHONE ENCOUNTER
Spoke with pt in regards to concerns about antibiotics not being in stock at [facility].    Called [facility] and confirmed they were able to pull medications from [facility]. They state they will be ready in an hour.     Called pt back to inform of this. Pt verbalized understanding.

## 2025-02-14 NOTE — TELEPHONE ENCOUNTER
----- Message from Jennie sent at 2/14/2025 12:45 PM CST -----  Regarding: medication  Contact: [phone]  Pt called regarding her medication and would like Bess to call her back at      Please call back at [phone]

## 2025-02-14 NOTE — PROGRESS NOTES
Subjective:     Patient    Yamel Shah is a 74 y.o. female.    Problem    2024: Previously seen by multiple wound care providers without significant improvement in wounds, has bounced around a lot. Has bilateral foot wounds related to scleroderma and immunosuppressant medications. Had improvement in granulation with regular wound care, oral and topical antibiotics, one debridement. Attempted topical vitamin E but caused burning.     01/03/25: Returns for bilateral foot wound care. No new concerns. Had pain flare on left foot, still sensitive.     01/08/25: Returns for bilateral foot wound care. No new concerns.     01/16/25: Returns for bilateral foot wound care. Awaiting appointment with ID 02/14. No new concerns.     01/24/25: Returns for bilateral foot wound care. Pain controlled on current medications. No new concerns.     01/31/25: Returns for bilateral foot wound care. Not tolerating amitriptyline well; still with nerve pain, tingling, cramping, burning despite duloxetine, tolerates duloxetine well.     02/07/25: Returns for bilateral foot wound care. Increased duloxetine at last visit for BLE nerve pain with significant improvement in pain without side effects. ID appointment rescheduled to 02/24.     02/14/25: Returns for bilateral foot wound care. Wants to restart PO antibiotics until she sees ID, thinks she was progressing better at that time.       History    History obtained from patient and review of medical records.     Past Medical History:   Diagnosis Date    Abnormal Pap smear     Acid reflux     Allergy     Arthritis     Encounter for blood transfusion     GERD (gastroesophageal reflux disease)     Hiatal hernia 03/24/2023    History of migraine headaches     Hx of colonic polyp     Hypertension     Idiopathic neuropathy 07/20/2012    ILD (interstitial lung disease) 11/06/2013    Iron deficiency anemia 03/18/2014    MRSA carrier     Osteopenia     Pneumonia     Pulmonary  fibrosis     Pulmonary hypertension     Raynaud's disease     Scleroderma, diffuse     Sjogren's syndrome     Vitamin D deficiency 11/14/2013       Past Surgical History:   Procedure Laterality Date    24 HOUR IMPEDANCE PH MONITORING OF ESOPHAGUS IN PATIENT NOT TAKING ACID REDUCING MEDICATIONS N/A 03/04/2020    Procedure: IMPEDANCE PH STUDY, ESOPHAGEAL, 24 HOUR, IN PATIENT NOT TAKING ACID REDUCING MEDICATION;  Surgeon: Annamaria Mendoza MD;  Location: [facility] (4TH FLR);  Service: Endoscopy;  Laterality: N/A;  OFF PPI/H2 Blocker   Motility Studies   Hold Narcotics x 1 days   Hold TCA x 1 days  2/26 - LVM attempting to confirm appt  2/27 - Confirmed appt    ANORECTAL MANOMETRY N/A 05/10/2021    Procedure: MANOMETRY, ANORECTAL with balloon expulsion test;  Surgeon: Annamaria Mendoza MD;  Location: [facility] (4TH FLR);  Service: Endoscopy;  Laterality: N/A;  order combined  covid test 5/7 [facility], instructions emailed-KPvt    BREAST BIOPSY      Left, benign    BRONCHOSCOPY N/A 10/2/2023    Procedure: bronch;  Surgeon: Roberta Leigh DO;  [facility];  Service: Endoscopy;  Laterality: N/A;    BRONCHOSCOPY N/A 9/16/2024    Procedure: Flexible bronchoscopy (BAL only);  Surgeon: Roberta Leigh DO;  [facility];  Service: Endoscopy;  Laterality: N/A;    CERVICAL CONIZATION   W/ LASER  1970    COLONOSCOPY      COLONOSCOPY N/A 03/29/2019    Procedure: COLONOSCOPY;  Surgeon: Annamaria Mendoza MD;  Location: [facility] (2ND FLR);  Service: Endoscopy;  Laterality: N/A;    COLONOSCOPY N/A 05/10/2021    Procedure: COLONOSCOPY;  Surgeon: Annamaria Mendoza MD;  Location: [facility] ENDO (4TH FLR);  Service: Endoscopy;  Laterality: N/A;  2nd floor-previous scopes done on 2nd floor, gastroparesis  full liquid diet x2 days, clear liquid x1 day prior to procedure  covid test 5/7 [facility], instructions emailed-KPvt  5/6 pt confirmed appt-KPvt    DILATION AND CURETTAGE OF UTERUS      ESOPHAGEAL MANOMETRY  WITH MEASUREMENT OF IMPEDANCE N/A 03/04/2020    Procedure: MANOMETRY, ESOPHAGUS, WITH IMPEDANCE MEASUREMENT;  Surgeon: Annamaira Mendoza MD;  Location: [facility] (4TH FLR);  Service: Endoscopy;  Laterality: N/A;  OFF PPI/H2 Blocker   Motility Studies   Hold Narcotics x 1 days   Hold TCA x 1 days    ESOPHAGOGASTRODUODENOSCOPY      ESOPHAGOGASTRODUODENOSCOPY N/A 03/29/2019    Procedure: EGD (ESOPHAGOGASTRODUODENOSCOPY);  Surgeon: Annamaria Mendoza MD;  Location: [facility] (2ND FLR);  Service: Endoscopy;  Laterality: N/A;  pulmonary htn    ESOPHAGOGASTRODUODENOSCOPY N/A 02/02/2021    Procedure: ESOPHAGOGASTRODUODENOSCOPY (EGD);  Surgeon: Annamaria Mendoza MD;  Location: [facility] (2ND FLR);  Service: Endoscopy;  Laterality: N/A;  2nd floor gastroparesis  3 days full liquid diet and 1 day clears  covid test 1/30 primary care, instructions sent to [facility]    ESOPHAGOGASTRODUODENOSCOPY N/A 07/01/2022    Procedure: ESOPHAGOGASTRODUODENOSCOPY (EGD);  Surgeon: Annamaria Mendoza MD;  Location: [facility] (2ND FLR);  Service: Endoscopy;  Laterality: N/A;  2nd floor-gastroparesis  full liquid diet x3 days, clear liquid diet x1 day prior to procedure  fully vaccinated, [facility]  6/29-pt confirmed new arrival time-[facility]    EXCISION, LESION, STOMACH, LAPAROSCOPIC N/A 03/23/2023    Procedure: XI ROBOTIC EXCISION, LESION, STOMACH;  Surgeon: Katherine Segura MD;  Location: [facility] OR [address] [place] [address];  Service: General;  Laterality: N/A;    EXCISIONAL BIOPSY, LYMPH NODE Right 05/26/2023    Procedure: EXCISIONAL BIOPSY, LYMPH NODE, INGUINAL;  Surgeon: Katherine Segura MD;  Location: [facility] OR [facility];  Service: General;  Laterality: Right;    HYSTERECTOMY  1990    FRANDY (AUB, Fibroids), ovaries remain    REPAIR, HERNIA, UMBILICAL N/A 03/23/2023    Procedure: REPAIR, HERNIA, UMBILICAL;  Surgeon: Katherine Segura MD;  Location: [facility] OR [address] [place] [address];  Service: General;  Laterality: N/A;    RIGHT HEART  CATHETERIZATION Right 01/05/2021    Procedure: INSERTION, CATHETER, RIGHT HEART;  Surgeon: Aureliano Sy MD;  Location: [facility] CATH LAB;  Service: Cardiology;  Laterality: Right;    RIGHT HEART CATHETERIZATION Right 03/16/2023    Procedure: INSERTION, CATHETER, RIGHT HEART;  Surgeon: Aureliano Sy MD;  Location: [facility] CATH LAB;  Service: Cardiology;  Laterality: Right;    RIGHT HEART CATHETERIZATION Right 11/22/2024    Procedure: INSERTION, CATHETER, RIGHT HEART;  Surgeon: Tamanna Martins MD;  Location: [facility] CATH LAB;  Service: Cardiology;  Laterality: Right;    ROBOT-ASSISTED LAPAROSCOPIC REPAIR OF INGUINAL HERNIA USING DA VERNELL XI Right 05/26/2023    Procedure: XI ROBOTIC REPAIR, HERNIA, INGUINAL, FEMORAL;  Surgeon: Katherine Segura MD;  Location: [facility] OR [address] [place] [address];  Service: General;  Laterality: Right;    ROBOT-ASSISTED REPAIR OF HIATAL HERNIA USING DA VERNELL XI N/A 03/23/2023    Procedure: XI ROBOTIC REPAIR, HERNIA, HIATAL;  Surgeon: Katherine Segura MD;  [facility];  Service: General;  Laterality: N/A;    VARICOSE VEIN SURGERY      XI ROBOTIC GASTROPEXY N/A 03/23/2023    Procedure: XI ROBOTIC GASTROPEXY;  Surgeon: Katherine Segura MD;  [facility];  Service: General;  Laterality: N/A;    XI ROBOTIC PYLOROMYOTOMY N/A 03/23/2023    Procedure: XI ROBOTIC PYLOROMYOTOMY;  Surgeon: Katherine Segura MD;  [facility];  Service: General;  Laterality: N/A;        Objective:     Vitals  Wt Readings from Last 1 Encounters:   02/04/25 62.4 kg (137 lb 9.1 oz)     Temp Readings from Last 1 Encounters:   02/04/25 97.7 °F (36.5 °C)     BP Readings from Last 1 Encounters:   02/14/25 (!) 129/57     Pulse Readings from Last 1 Encounters:   02/14/25 68       Dermatological Exam    Skin:  Skin of both feet thickened (scleroderma), stiff, with extensive ulcerations of both feet down to fat with slowly improving granulation; xerosis        Nails:  10 nail(s) thickened, and 10 nail(s) discolored    Vascular Exam    Arteries:  Posterior tibial artery palpable on right  Dorsalis pedis artery palpable on right  Posterior tibial artery palpable on left  Dorsalis pedis artery palpable on left    Veins:  Superficial veins unremarkable on right  Superficial veins unremarkable on left    Swellin+ nonpitting on right  1+ nonpitting on left    Neurological Exam    [place] touch test:  6/6 sites sensed, light touch intact     Musculoskeletal Exam    Footwear:  Casual on right  Casual on left    Gait Exam:   Ambulatory Status: Ambulatory  Gait: Normal  Assistive Devices: None    Foot Progression Angle:  Normal on right  Normal on left    Right Lower Extremity Additional Findings:  Right foot and ankle function, strength, and range of motion unremarkable except as noted above.     Left Lower Extremity Additional Findings:  Left foot and ankle function, strength, and range of motion unremarkable except as noted above.    Imaging and Other Tests    Imaging:  Independently reviewed and interpreted imaging, findings are as follows: N/A     Assessment:     Encounter Diagnoses   Name Primary?    Cellulitis of lower extremity, unspecified laterality     Ulcers of both lower extremities with fat layer exposed                 Plan:     I counseled the patient on her conditions, their implications and medical management.    Neuropathy: chronic stable  -Stop amitriptyline.   -Continue duloxetine 120 mg/day.      Bilateral ulcers, cellulitis, swelling: chronic stable   -Dressings changed. Leave intact as long as possible then continue usual dressing changes.   -Protected WB in surgical shoes.    -Restart PO antibiotics.   -Establish care with ID as scheduled.     Scleroderma: chronic stable  -Continue PO tadalafil 40 mg/day.      No LOS data to display  This includes face to face time and non-face to face time preparing to see the patient (eg, review of tests),  obtaining and/or reviewing separately obtained history, documenting clinical information in the electronic or other health record, independently interpreting results and communicating results to the patient/family/caregiver, or care coordinator.     Return to clinic in 1-2 weeks, call sooner PRN.

## 2025-02-17 ENCOUNTER — HOSPITAL ENCOUNTER (OUTPATIENT)
Dept: PULMONOLOGY | Facility: CLINIC | Age: 75
Discharge: HOME | End: 2025-02-17
Payer: MEDICARE

## 2025-02-17 ENCOUNTER — RESULTS FOLLOW-UP (OUTPATIENT)
Dept: TRANSPLANT | Facility: HOSPITAL | Age: 75
End: 2025-02-17
Payer: MEDICARE

## 2025-02-17 ENCOUNTER — HOSPITAL ENCOUNTER (OUTPATIENT)
Dept: RADIOLOGY | Facility: HOSPITAL | Age: 75
Discharge: HOME | End: 2025-02-17
Attending: INTERNAL MEDICINE
Payer: MEDICARE

## 2025-02-17 ENCOUNTER — OFFICE VISIT (OUTPATIENT)
Dept: TRANSPLANT | Facility: CLINIC | Age: 75
End: 2025-02-17
Attending: INTERNAL MEDICINE
Payer: MEDICARE

## 2025-02-17 VITALS
TEMPERATURE: 98 F | OXYGEN SATURATION: 98 % | HEIGHT: 65 IN | SYSTOLIC BLOOD PRESSURE: 138 MMHG | BODY MASS INDEX: 23.49 KG/M2 | WEIGHT: 141 LBS | DIASTOLIC BLOOD PRESSURE: 63 MMHG | HEART RATE: 83 BPM | RESPIRATION RATE: 16 BRPM

## 2025-02-17 DIAGNOSIS — J84.9 INTERSTITIAL PULMONARY DISEASE, UNSPECIFIED: ICD-10-CM

## 2025-02-17 DIAGNOSIS — M34.9 SCLERODERMA WITH PULMONARY INVOLVEMENT: ICD-10-CM

## 2025-02-17 DIAGNOSIS — K21.9 GASTROESOPHAGEAL REFLUX DISEASE, UNSPECIFIED WHETHER ESOPHAGITIS PRESENT: ICD-10-CM

## 2025-02-17 DIAGNOSIS — L97.519 SKIN ULCERS OF BOTH FEET: ICD-10-CM

## 2025-02-17 DIAGNOSIS — J84.9 ILD (INTERSTITIAL LUNG DISEASE): Primary | ICD-10-CM

## 2025-02-17 DIAGNOSIS — R91.1 PULMONARY NODULE: ICD-10-CM

## 2025-02-17 DIAGNOSIS — I27.21 WHO GROUP 1 PULMONARY ARTERIAL HYPERTENSION: ICD-10-CM

## 2025-02-17 DIAGNOSIS — L97.529 SKIN ULCERS OF BOTH FEET: ICD-10-CM

## 2025-02-17 PROCEDURE — 94010 BREATHING CAPACITY TEST: CPT | Mod: PBBFAC,NTX | Performed by: INTERNAL MEDICINE

## 2025-02-17 PROCEDURE — 94727 GAS DIL/WSHOT DETER LNG VOL: CPT | Mod: PBBFAC,NTX | Performed by: INTERNAL MEDICINE

## 2025-02-17 PROCEDURE — 71250 CT THORAX DX C-: CPT | Mod: TC,TXP

## 2025-02-17 PROCEDURE — 94729 DIFFUSING CAPACITY: CPT | Mod: PBBFAC,NTX | Performed by: INTERNAL MEDICINE

## 2025-02-17 PROCEDURE — 99213 OFFICE O/P EST LOW 20 MIN: CPT | Mod: PBBFAC,25,TXP | Performed by: NURSE PRACTITIONER

## 2025-02-17 NOTE — PROGRESS NOTES
ADVANCED LUNG DISEASE CLINIC FOLLOW-UP                                                                                                                                             Reason for Visit:  SSc-ILD    Referring Physician: Roberta Leigh, *    History of Present Illness: Yamel Shah is a 74 y.o. female who is on 0L of oxygen.  She is on no assisted ventilation.  Her New York Heart Association Class is II and a Karnofsky score of 90% - Able to carry on normal activity: minor symptoms of disease. She is not diabetic.    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 No exacerbations or further episodes of hemoptysis. Had bronchoscopy last year- cytology and cultures negative. Overall, patient feels she is back to her baseline. Follows with podiatry for management of chronic ulcers. Remains on MMF, lasix, OFEV and Adcirca. Tolerating well. No respiratory complaints, hospital visits, infections or exacerbations.      Past Medical History:   Diagnosis Date    Abnormal Pap smear     Acid reflux     Allergy     Arthritis     Encounter for blood transfusion     GERD (gastroesophageal reflux disease)     Hiatal hernia 03/24/2023    History of migraine headaches     Hx of colonic polyp     Hypertension     Idiopathic neuropathy 07/20/2012    ILD (interstitial lung disease) 11/06/2013    Iron deficiency anemia 03/18/2014    MRSA carrier     Osteopenia     Pneumonia     Pulmonary fibrosis     Pulmonary hypertension     Raynaud's disease     Scleroderma, diffuse     Sjogren's syndrome     Vitamin D deficiency 11/14/2013       Past Surgical History:   Procedure Laterality Date    24 HOUR IMPEDANCE PH MONITORING OF ESOPHAGUS IN PATIENT NOT TAKING ACID REDUCING MEDICATIONS N/A 03/04/2020    Procedure: IMPEDANCE PH STUDY, ESOPHAGEAL, 24 HOUR, IN PATIENT NOT TAKING ACID REDUCING MEDICATION;  Surgeon: Annamaria Mendoza MD;  Location: [facility] ([address] [place] [address]);  Service: Endoscopy;  Laterality: N/A;  OFF  PPI/H2 Blocker   Motility Studies   Hold Narcotics x 1 days   Hold TCA x 1 days  2/26 - LVM attempting to confirm appt  2/27 - Confirmed appt    ANORECTAL MANOMETRY N/A 05/10/2021    Procedure: MANOMETRY, ANORECTAL with balloon expulsion test;  Surgeon: Annamaria Mendoza MD;  Location: [facility] ENDO (4TH FLR);  Service: Endoscopy;  Laterality: N/A;  order combined  covid test 5/7 Jehovah's witness, instructions emailed-KPvt    BREAST BIOPSY      Left, benign    BRONCHOSCOPY N/A 10/2/2023    Procedure: bronch;  Surgeon: Roberta Leigh DO;  Location: [facility] OR 2ND FLR;  Service: Endoscopy;  Laterality: N/A;    BRONCHOSCOPY N/A 9/16/2024    Procedure: Flexible bronchoscopy (BAL only);  Surgeon: Roberta Leigh DO;  Location: [facility] OR 2ND FLR;  Service: Endoscopy;  Laterality: N/A;    CERVICAL CONIZATION   W/ LASER  1970    COLONOSCOPY      COLONOSCOPY N/A 03/29/2019    Procedure: COLONOSCOPY;  Surgeon: Annamaria Mendoza MD;  Location: [facility] ENDO (2ND FLR);  Service: Endoscopy;  Laterality: N/A;    COLONOSCOPY N/A 05/10/2021    Procedure: COLONOSCOPY;  Surgeon: Annamaria Mendoza MD;  Location: [facility] ENDO (4TH FLR);  Service: Endoscopy;  Laterality: N/A;  2nd floor-previous scopes done on 2nd floor, gastroparesis  full liquid diet x2 days, clear liquid x1 day prior to procedure  covid test 5/7 Jehovah's witness, instructions emailed-[facility]  5/6 pt confirmed appt-KPvt    DILATION AND CURETTAGE OF UTERUS      ESOPHAGEAL MANOMETRY WITH MEASUREMENT OF IMPEDANCE N/A 03/04/2020    Procedure: MANOMETRY, ESOPHAGUS, WITH IMPEDANCE MEASUREMENT;  Surgeon: Annamaria Mendoza MD;  Location: [facility] ENDO (4TH FLR);  Service: Endoscopy;  Laterality: N/A;  OFF PPI/H2 Blocker   Motility Studies   Hold Narcotics x 1 days   Hold TCA x 1 days    ESOPHAGOGASTRODUODENOSCOPY      ESOPHAGOGASTRODUODENOSCOPY N/A 03/29/2019    Procedure: EGD (ESOPHAGOGASTRODUODENOSCOPY);  Surgeon: Annamaria Mendoza MD;  Location: [facility] ENDO (2ND FLR);  Service: Endoscopy;  Laterality:  N/A;  pulmonary htn    ESOPHAGOGASTRODUODENOSCOPY N/A 02/02/2021    Procedure: ESOPHAGOGASTRODUODENOSCOPY (EGD);  Surgeon: Annamaria Mendoza MD;  [facility]  Service: Endoscopy;  Laterality: N/A;  2nd floor gastroparesis  3 days full liquid diet and 1 day clears  covid test 1/30 primary care, instructions sent to [facility]    ESOPHAGOGASTRODUODENOSCOPY N/A 07/01/2022    Procedure: ESOPHAGOGASTRODUODENOSCOPY (EGD);  Surgeon: Annamaria Mendoza MD;  [facility]  Service: Endoscopy;  Laterality: N/A;  2nd floor-gastroparesis  full liquid diet x3 days, clear liquid diet x1 day prior to procedure  fully vaccinated, instructions sent to [facility]  6/29-pt confirmed new arrival time-[facility]    EXCISION, LESION, STOMACH, LAPAROSCOPIC N/A 03/23/2023    Procedure: XI ROBOTIC EXCISION, LESION, STOMACH;  Surgeon: Katherine Segura MD;  Location: [facility] OR [address] [place] [address];  Service: General;  Laterality: N/A;    EXCISIONAL BIOPSY, LYMPH NODE Right 05/26/2023    Procedure: EXCISIONAL BIOPSY, LYMPH NODE, INGUINAL;  Surgeon: Katherine Segura MD;  Location: [facility] OR [address] [place] [address];  Service: General;  Laterality: Right;    HYSTERECTOMY  1990    FRANDY (AUB, Fibroids), ovaries remain    REPAIR, HERNIA, UMBILICAL N/A 03/23/2023    Procedure: REPAIR, HERNIA, UMBILICAL;  Surgeon: Katherine Segura MD;  Location: [facility] OR [address] [place] [address];  Service: General;  Laterality: N/A;    RIGHT HEART CATHETERIZATION Right 01/05/2021    Procedure: INSERTION, CATHETER, RIGHT HEART;  Surgeon: Aureliano Sy MD;  Location: [facility] CATH LAB;  Service: Cardiology;  Laterality: Right;    RIGHT HEART CATHETERIZATION Right 03/16/2023    Procedure: INSERTION, CATHETER, RIGHT HEART;  Surgeon: Aureliano Sy MD;  Location: [facility] CATH LAB;  Service: Cardiology;  Laterality: Right;    RIGHT HEART CATHETERIZATION Right 11/22/2024    Procedure: INSERTION, CATHETER, RIGHT HEART;  Surgeon: Tamanna Martins MD;   Location: [facility] CATH LAB;  Service: Cardiology;  Laterality: Right;    ROBOT-ASSISTED LAPAROSCOPIC REPAIR OF INGUINAL HERNIA USING DA VERNELL XI Right 05/26/2023    Procedure: XI ROBOTIC REPAIR, HERNIA, INGUINAL, FEMORAL;  Surgeon: Katherine Segura MD;  [facility];  Service: General;  Laterality: Right;    ROBOT-ASSISTED REPAIR OF HIATAL HERNIA USING DA VERNELL XI N/A 03/23/2023    Procedure: XI ROBOTIC REPAIR, HERNIA, HIATAL;  Surgeon: Katherine Segura MD;  [facility];  Service: General;  Laterality: N/A;    VARICOSE VEIN SURGERY      XI ROBOTIC GASTROPEXY N/A 03/23/2023    Procedure: XI ROBOTIC GASTROPEXY;  Surgeon: Katherine Segura MD;  [facility];  Service: General;  Laterality: N/A;    XI ROBOTIC PYLOROMYOTOMY N/A 03/23/2023    Procedure: XI ROBOTIC PYLOROMYOTOMY;  Surgeon: Katherine Segura MD;  [facility];  Service: General;  Laterality: N/A;       Allergies: Sulfamethoxazole, Spironolactone, Sulfa (sulfonamide antibiotics), and Tramadol    Current Outpatient Medications   Medication Sig    acetaminophen-codeine 300-60mg (TYLENOL #4) 300-60 mg Tab Take 1 tablet by mouth every evening.    albuterol (VENTOLIN HFA) 90 mcg/actuation inhaler Inhale 2 puffs into the lungs every 6 (six) hours as needed for Wheezing. Rescue    amitriptyline (ELAVIL) 10 MG tablet Take 1 tablet (10 mg total) by mouth every evening.    ammonium lactate 12 % Crea Apply 20 g topically once daily.    amoxicillin-pot clavulanate 250-62.5 mg/5ml (AUGMENTIN) 250-62.5 mg/5 mL suspension Take 5 mLs (250 mg total) by mouth once daily for 10 days. Discard remainder after 10 days    DULoxetine (CYMBALTA) 60 MG capsule Take 1 capsule (60 mg total) by mouth once daily.    ergocalciferol (ERGOCALCIFEROL) 50,000 unit Cap Take 1 capsule (50,000 Units total) by mouth every 7 days.    furosemide (LASIX) 20 MG tablet Take 1 tablet (20 mg total) by mouth once daily.     levoFLOXacin (LEVAQUIN) 250 mg/10 mL Soln Take 20 mLs (500 mg total) by mouth once daily.    multivitamin capsule Take 1 capsule by mouth once daily.    mupirocin (BACTROBAN) 2 % ointment Apply topically once daily.    mycophenolate mofetil (CELLCEPT) 200 mg/mL SusR Take 5 mLs (1,000 mg total) by mouth 2 (two) times daily.    nabumetone (RELAFEN) 750 MG tablet Take 1 tablet (750 mg total) by mouth daily as needed for Pain.    nebulizer and compressor Rosemary Use as directed    NIFEdipine (ADALAT CC) 90 MG TbSR TAKE 1 TABLET BY MOUTH EVERY EVENING WITH 30 MG FOR A TOTAL OF 120MG    NIFEdipine (PROCARDIA-XL) 30 MG (OSM) 24 hr tablet TAKE 1 TABLET(30 MG) BY MOUTH EVERY DAY    NIFEdipine (PROCARDIA-XL) 30 MG (OSM) 24 hr tablet Take 1 tablet (30 mg total) by mouth once daily.    NIFEdipine (PROCARDIA-XL) 30 MG (OSM) 24 hr tablet Take 1 tablet (30 mg total) by mouth once daily.    nintedanib (OFEV) 100 mg Cap Take 100 mg by mouth 2 (two) times a day.    pravastatin (PRAVACHOL) 20 MG tablet Take 1 tablet (20 mg total) by mouth every evening.    pregabalin (LYRICA) 300 MG Cap Take 1 capsule (300 mg total) by mouth 2 (two) times daily.    PREVIDENT 5000 BOOSTER PLUS 1.1 % Pste SMARTSIG:To Teeth    RABEprazole (ACIPHEX) 20 mg tablet Take 1 tablet (20 mg total) by mouth 2 (two) times daily.    tadalafil (ADCIRCA) 20 mg Tab Take 2 tablets (40 mg total) by mouth once daily.    tiZANidine (ZANAFLEX) 4 MG tablet Take 2 tablets (8 mg total) by mouth 2 (two) times daily as needed (muscle pain).     No current facility-administered medications for this visit.     Facility-Administered Medications Ordered in Other Visits   Medication    fentaNYL 50 mcg/mL injection 25 mcg    haloperidol lactate injection 0.5 mg    sodium chloride 0.9% flush 10 mL       Immunization History   Administered Date(s) Administered    COVID-19 MRNA, LN-S PF (MODERNA HALF 0.25 ML DOSE) 04/21/2022    COVID-19, MRNA, LN-S, PF (MODERNA FULL 0.5 ML DOSE)  02/09/2021, 03/12/2021, 09/21/2021    COVID-19, MRNA, LN-S, PF (Pfizer) (Purple Cap) 10/13/2022    COVID-19, mRNA, LNP-S, PF (Moderna) Ages 12+ 10/18/2023, 05/02/2024    COVID-19, mRNA, LNP-S, PF, rosi-sucrose, 30 mcg/0.3 mL (Pfizer Ages 12+) 11/07/2024    COVID-19, mRNA, LNP-S, bivalent booster, PF (Moderna Omicron)12 + YEARS 10/13/2022, 10/13/2022    Influenza 11/02/2015, 09/21/2021, 09/22/2023    Influenza (FLUAD) - Quadrivalent - Adjuvanted - PF *Preferred* (65+) 09/11/2020, 09/12/2022, 09/22/2023    Influenza - Quadrivalent - PF *Preferred* (6 months and older) 11/03/2006, 10/08/2007, 09/29/2009, 09/26/2014, 09/26/2014, 11/02/2015, 11/02/2015, 09/27/2016    Influenza - Trivalent - Afluria, Fluzone MDV 11/03/2006, 10/08/2007, 09/29/2009, 09/26/2014    Influenza - Trivalent - Fluad - Adjuvanted - PF (65 years and older 09/13/2024    Influenza - Trivalent - Fluarix, Flulaval, Fluzone, Afluria - PF 11/02/2015    Influenza - Trivalent - Fluzone High Dose - PF (65 years and older) 10/09/2017, 10/11/2017, 10/27/2018, 09/09/2019    Influenza Split 11/03/2006, 10/08/2007, 09/29/2009, 09/26/2014    Pneumococcal Conjugate - 13 Valent 01/16/2013, 01/16/2013    Pneumococcal Polysaccharide - 23 Valent 09/27/2016, 11/12/2021    RSVpreF (Arexvy) 10/27/2023    Tdap 02/06/2019    Zoster 01/16/2013    Zoster Recombinant 11/30/2018, 02/12/2019     Family History:    Family History   Problem Relation Name Age of Onset    Breast cancer Mother Tiki Singh     Cancer Mother Tiki Singh         Breast    Hypertension Father Madi     Diabetes Father Madi         Amputation of legs    Breast cancer Sister Brenda     Diabetes Sister Brenda     Arthritis Sister Brenda     Cancer Sister Brenda         Breast    Osteoarthritis Brother Luis     Diabetes Brother Luis     Arthritis Brother Luis     Birth defects Brother Luis         Polio at birth, recently had knee replacement surgery on left knee    No Known Problems Daughter       No Known Problems Daughter      No Known Problems Son      No Known Problems Son      Breast cancer Maternal Aunt Gege     Cancer Maternal Aunt Gege         Breast    Early death Sister Philomena     Melanoma Neg Hx      Colon cancer Neg Hx      Crohn's disease Neg Hx      Stomach cancer Neg Hx      Ulcerative colitis Neg Hx      Rectal cancer Neg Hx      Irritable bowel syndrome Neg Hx      Esophageal cancer Neg Hx      Celiac disease Neg Hx      Ovarian cancer Neg Hx      Liver cancer Neg Hx      Pancreatic cancer Neg Hx       Social History     Substance and Sexual Activity   Alcohol Use Not Currently      Social History     Substance and Sexual Activity   Drug Use No      Social History     Socioeconomic History    Marital status:      Spouse name: Lewis    Number of children: 4   Occupational History    Occupation: -retired     Employer: [employer]     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 No    Breast-feeding No   Social History Narrative         Social Drivers of Health     Financial Resource Strain: Low Risk  (3/26/2024)    Overall Financial Resource Strain (CARDIA)     Difficulty of Paying Living Expenses: Not hard at all   Food Insecurity: No Food Insecurity (3/26/2024)    Hunger Vital Sign     Worried About Running Out of Food in the Last Year: Never true     Ran Out of Food in the Last Year: Never true   Transportation Needs: No Transportation Needs (3/26/2024)    PRAPARE - Transportation     Lack of Transportation (Medical): No     Lack of Transportation (Non-Medical): No   Physical Activity:  Insufficiently Active (3/26/2024)    Exercise Vital Sign     Days of Exercise per Week: 1 day     Minutes of Exercise per Session: 10 min   Stress: No Stress Concern Present (3/26/2024)    [employer] [place] [employer] - Occupational Stress Questionnaire     Feeling of Stress : Only a little   Housing Stability: Low Risk  (3/26/2024)    Housing Stability Vital Sign     Unable to Pay for Housing in the Last Year: No     Number of Places Lived in the Last Year: 1     Unstable Housing in the Last Year: No     Review of Systems   Constitutional:  Negative for chills, diaphoresis, fever, malaise/fatigue and weight loss.   HENT:  Negative for congestion, ear discharge, ear pain, hearing loss, nosebleeds, sinus pain, sore throat and tinnitus.    Eyes:  Negative for blurred vision, double vision, photophobia, pain, discharge and redness.   Respiratory:  Positive for shortness of breath (heavy exertion only). Negative for cough, hemoptysis, sputum production, wheezing and stridor.    Cardiovascular:  Negative for chest pain, palpitations, orthopnea, claudication, leg swelling and PND.   Gastrointestinal:  Positive for heartburn. Negative for abdominal pain, blood in stool, constipation, diarrhea, melena, nausea and vomiting.   Genitourinary:  Negative for dysuria, flank pain, frequency, hematuria and urgency.   Musculoskeletal:  Negative for back pain, falls, joint pain, myalgias and neck pain.   Skin:  Negative for itching and rash.   Neurological:  Negative for dizziness, tingling, tremors, sensory change, speech change, focal weakness, seizures, loss of consciousness, weakness and headaches.   Endo/Heme/Allergies:  Negative for environmental allergies and polydipsia. Does not bruise/bleed easily.   Psychiatric/Behavioral:  Negative for depression, hallucinations, memory loss, substance abuse and suicidal ideas. The patient is not nervous/anxious and does not have insomnia.      Vitals  /63 (BP Location:  "Right arm, Patient Position: Sitting)   Pulse 83   Temp 97.9 °F (36.6 °C) (Oral)   Resp 16   Ht 5' 5" (1.651 m)   Wt 64 kg (141 lb)   SpO2 98%   BMI 23.46 kg/m²   Physical Exam  Vitals and nursing note reviewed.   Constitutional:       General: She is not in acute distress.     Appearance: She is well-developed. She is not diaphoretic.   HENT:      Head: Normocephalic and atraumatic.      Nose: Nose normal.      Mouth/Throat:      Pharynx: No oropharyngeal exudate.   Eyes:      General: No scleral icterus.     Conjunctiva/sclera: Conjunctivae normal.      Pupils: Pupils are equal, round, and reactive to light.   Neck:      Thyroid: No thyromegaly.      Vascular: No JVD.      Trachea: No tracheal deviation.   Cardiovascular:      Rate and Rhythm: Normal rate and regular rhythm.      Heart sounds: Normal heart sounds. No murmur heard.     No friction rub. No gallop.   Pulmonary:      Effort: Pulmonary effort is normal. No respiratory distress.      Breath sounds: Wheezing present.      Comments: Diffuse expiratory squeaks  Abdominal:      General: Bowel sounds are normal. There is no distension.      Palpations: Abdomen is soft.      Tenderness: There is no abdominal tenderness.   Musculoskeletal:         General: No deformity. Normal range of motion.      Cervical back: Normal range of motion and neck supple.   Skin:     General: Skin is warm and dry.      Capillary Refill: Capillary refill takes less than 2 seconds.      Coloration: Skin is not pale.      Findings: No erythema or rash.      Comments: Skin tightening.  Digital ulcers and nailbed deformities.  Bilateral foot ulcers in dry intact dressing     Neurological:      Mental Status: She is alert and oriented to person, place, and time.      Cranial Nerves: No cranial nerve deficit.   Psychiatric:         Judgment: Judgment normal.         Labs:          2/17/2025    11:31 AM 8/22/2024     2:13 PM 3/4/2024     1:35 PM 9/18/2023    11:06 AM 8/17/2023     " 1:27 PM 5/23/2023     9:20 AM 10/7/2022    12:56 PM   Pulmonary Function Tests   FVC 1.76 liters 1.77 liters 1.81 liters 1.76 liters 1.79 liters 1.9 liters 1.82 liters   FEV1 1.41 liters 1.46 liters 1.43 liters 1.46 liters 1.42 liters 1.58 liters 1.49 liters   TLC (liters) 3.01 liters 2.99 liters 3.28 liters 2.7 liters 3.11 liters 3.41 liters 3.2 liters   DLCO (ml/mmHg sec) 11.37 ml/mmHg sec 10.7 ml/mmHg sec 10.62 ml/mmHg sec 9.15 ml/mmHg sec 10.8 ml/mmHg sec 11.37 ml/mmHg sec 11.89 ml/mmHg sec   FVC% 72.2 61.9 63 66.1 61.8 71.1 67   FEV1% 74.7 66.2 64 70.5 63.8 75.6 71   FEF 25-75 1.36 1.48 1.32 1.64 1.35 1.82 1.7   FEF 25-75% 54.5 58.4 72 93.5 73.2 103.5 95   TLC% 59 58.6 64 52.8 60.9 66.8 63   RV 1.25 1.18 1.33 0.94 1.32 1.46 1.33   RV% 58.1 55.2 62 44.2 62.2 68.5 63   DLCO% 53 49.5 49 42.1 49.7 52.3 54         8/22/2024    12:04 PM 4/29/2024     2:06 PM 3/4/2024    12:40 PM 9/18/2023    10:57 AM 8/17/2023     1:29 PM 5/22/2023     9:20 AM 2/28/2023    12:42 PM   6MW   6MWT Status completed without stopping completed without stopping completed without stopping completed without stopping completed without stopping completed without stopping completed without stopping   Patient Reported Leg pain  Dyspnea;Leg pain  Other (Comment)  Dyspnea  Dyspnea  No complaints  Other (Comment)    Was O2 used? No No No No No No No   6MW Distance walked (feet) 1275 feet 1532 feet 1562 feet 1450 feet 1300 feet 1500 feet 1500 feet   Distance walked (meters) 388.62 meters 466.95 meters 476.1 meters 441.96 meters 396.24 meters 457.2 meters 457.2 meters   Did patient stop? No No No No No No No   Oxygen Saturation 97 % 99 % 100 % 100 % 98 % 100 % 98 %   Supplemental Oxygen Room Air  Room Air  Room Air  Room Air  Room Air  Room Air  Room Air    Heart Rate 68 bpm 77 bpm 77 bpm 75 bpm 70 bpm 62 bpm 74 bpm   Blood Pressure 125/63 133/66 134/88 152/67 141/63 109/51 133/63   Ju Dyspnea Rating  somewhat heavy moderate light light light  moderate moderate   Oxygen Saturation 97 % 97 % 96 % 98 % 96 % 98 % 94 %   Supplemental Oxygen Room Air  Room Air  Room Air  Room Air  Room Air  Room Air  Room Air    Heart Rate 86 bpm 98 bpm 111 bpm 102 bpm 98 bpm 90 bpm 113 bpm   Blood Pressure 133/56 167/63 156/80 168/71 159/70 136/62 169/76   Ju Dyspnea Rating  moderate somewhat heavy somewhat heavy moderate somewhat heavy somewhat heavy somewhat heavy   Recovery Time (seconds) 65 seconds 157 seconds 121 seconds 123 seconds 100 seconds 131 seconds 160 seconds   Oxygen Saturation 99 % 100 % 100 % 100 % 100 % 100 % 97 %   Supplemental Oxygen Room Air   Room Air  Room Air  Room Air  Room Air  Room Air    Heart Rate 78 bpm 88 bpm 84 bpm 90 bpm 82 bpm 71 bpm 84 bpm       Data saved with a previous flowsheet row definition       Imaging:  Results for orders placed during the hospital encounter of 12/22/20    CT Chest Without Contrast    Narrative  EXAMINATION:  CT CHEST WITHOUT CONTRAST    CLINICAL HISTORY:  pulmonary hypertension; Pulmonary hypertension, unspecified    TECHNIQUE:  Using low dose technique the chest was surveyed from above the pulmonary apices through the posterior costophrenic angles.  Data was reconstructed for multiplanar images in axial, sagittal and coronal planes and for maximal intensity projection images in the axial plane.    All CT scans at this facility use dose modulation, iterative reconstruction and/or weight based dosing when appropriate to reduce radiation dose to as low as reasonably achievable.    Xray dose: DLP = 152.03 mGy-cm.    COMPARISON:  CT chest abdomen without contrast 03/13/2019.  CT chest without contrast 09/18/2015.    FINDINGS:  Base of Neck: No significant abnormality.    Aorta: Left-sided aortic arch with 3 arterial branches. The aorta maintains normal caliber, contour and course. There is mild calcification of the thoracic aorta or coronary arteries.    Heart/pericardium: Normal size.  No pericardial effusion or  calcification.  Calcification of the aortic valve annulus.    Pulmonary vasculature: Pulmonary arteries distribute normally.  Pulmonary artery size is neither specific nor sensitive for normal or elevated pulmonary arterial pressures.    -There are four pulmonary veins.    Barbara/Mediastinum: No pathologic noelle enlargement.    Esophagus: The esophagus is mildly dilated with dependent fluid, similar to prior exam.    Upper Abdomen: No abnormality of the partially imaged upper abdomen.    Thoracic soft tissues: Normal. Both breasts are present.    Bones: No acute fracture. No suspicious lytic or sclerotic lesion.    Airways: Patent.    Lungs/pleura:    -Stable biapical scarring.    -Redemonstration of cystic changes and reticulation plus subsegmental ground-glass disease, most extensive in the lower lung zones.  Radiographic appearance is more typical for NSIP but could represent UIP in this patient with scleroderma.    -There are stable scattered foci of tree-in-bud nodularities, for example within the superior segment of the right lower lobe (axial series 4, image 211).  There is associated dilatation of multiple adjacent small airways.    -0.4 cm solid pulmonary nodule in the apical segment of the right upper lobe (axial series 4, image 83), stable dating back to 2015.    -No pleural fluid or thickening.No pleural calcification. No pneumothorax.    Impression  1.  Overall stable appearing chronic interstitial lung disease consistent with patient's history of scleroderma.  Radiographic appearance is more typical for NSIP and UIP, noting that UIP is a more common disease.    2.  Previously characterized tree-in-bud opacity along the superior aspect of the right oblique fissure appears stable and may reflect chronic inflammatory process.    3.  0.4 cm solid right upper lobe pulmonary nodule, stable dating back to 09/18/2015.  Such stability favors benign etiology.    4.  Persistent fluid-filled esophagus as noted on  multiple priors placing the patient at increased risk for aspiration.  This may be related to the patient's history of scleroderma; clinical considerations will determine if further assessment of esophageal motility is warranted.    Electronically signed by resident: Leobardo Arellano  Date:    12/22/2020  Time:    11:43    Electronically signed by: Maxine Del Cid MD  Date:    12/22/2020  Time:    14:23    Reading Physician Reading Date Result Priority   Bg Ferreira MD  [phone] 9/8/2023 Routine   Mikala Kruse MD  [phone] 9/8/2023      Narrative & Impression  EXAMINATION:  CT CHEST WITHOUT CONTRAST     CLINICAL HISTORY:  Lung nodule, 6-8mm;3 month follow up solitary pulmonary nodule; Abnormal findings on diagnostic imaging of other specified body structures     TECHNIQUE:  Low dose axial images, sagittal and coronal reformations were obtained from the thoracic inlet to the lung bases. Contrast was not administered.     All CT scans at this facility use dose modulation, iterative reconstruction and/or weight based dosing when appropriate to reduce rad iation dose to as low as reasonably achievable.     COMPARISON:  12/22/2020, 06/02/2023     FINDINGS:  No intravenous contrast was administered for this examination.  Therefore, it may have diminished sensitivity for detection of certain abnormalities.     Thyroid gland: Within normal limits.     Thoracic soft tissues: Unremarkable.     Mediastinum: No pathologically enlarged lymph nodes.     Cardiovascular: Normal heart size.No pericardial effusion.     Trachea: Within normal limits.     Lungs/pleura/airways:     Redemonstration of basilar and subpleural predominant reticulation and ground-glass with architectural distortion, volume loss, and traction bronchiectasis/bronchiolectasis.  Worsening of patchy areas of ground-glass bilaterally.  There is a straight edge sign present with sharply marginated basilar pseudo honeycombing likely  representing clustered dilated bronchiectasis and cystic bronchiectasis.  Anterior upper lobe sign also noted.  These findings are grossly stable compared to CT 12/22/2020.     0.6 cm solid pulmonary nodule in the left upper lobe (4-193), 0.4 cm solid pulmonary nodule left upper lobe (4-212), right upper lobe 0.4 cm solid pulmonary nodule (4-89).  These nodules are unchanged from CT 06/02/2023, but new from CT 12/22/2020..     Esophagus: Esophagus is dilated and fluid-filled to the level of the aortic arch compatible with achalasia in the setting of scleroderma..     Upper abdomen:     Partially imaged.  No significant abnormalities.     Bones: Unremarkable for stated age.     Impression:     Redemonstration of pulmonary fibrosis with mild increased bilateral patchy areas of patchy ground-glass.  Findings favored represent as NSIP patient with history of scleroderma.     Solid pulmonary nodules measuring up to 0.6 cm as detailed above which are stable from CT 06/02/2023, but new from 12/22/2020.  This can be reassessed in the next follow-up     Stable patulous esophagus.     Electronically signed by resident: Mikala Kruse  Date:                                            09/08/2023  Time:                                           15:29     Electronically signed by:Bg Granado  Date:                                            09/08/2023  Time:                                           17:14    Cardiodiagnostics:    Echo    Interpretation Summary    Left Ventricle: The left ventricle is normal in size. Normal wall thickness. Normal wall motion. There is normal systolic function with a visually estimated ejection fraction of 60 - 65%. There is normal diastolic function.    Right Ventricle: Normal right ventricular cavity size. Wall thickness is normal. Right ventricle wall motion  is normal. Systolic function is normal.    Pulmonary Artery: The estimated pulmonary artery systolic pressure is 40  mmHg.    Results for orders placed during the hospital encounter of 03/15/24    Echo    Interpretation Summary    Left Ventricle: The left ventricle is normal in size. Normal wall thickness. Normal wall motion. There is normal systolic function with a visually estimated ejection fraction of 55 - 60%. There is normal diastolic function.    Right Ventricle: Normal right ventricular cavity size. Wall thickness is normal. Right ventricle wall motion  is normal. Systolic function is normal.    The left atrium is moderately dilated.    Tricuspid Valve: There is mild regurgitation.    Pulmonary Artery: The estimated pulmonary artery systolic pressure is 43 mmHg.    IVC/SVC: Intermediate venous pressure at 8 mmHg.    Results for orders placed during the hospital encounter of 11/22/24    Cardiac catheterization    Conclusion    The estimated blood loss was <50 mL.    Stable on current therapy. No changes, followup with pulmonary hypertension team.    The procedure log was documented by Documenter: Estee Paul RT and verified by Tamanna Martins MD.    Date: 11/22/2024  Time: 10:23 AM        Assessment:  1. ILD (interstitial lung disease)    2. Scleroderma with pulmonary involvement    3. WHO group 1 pulmonary arterial hypertension    4. Gastroesophageal reflux disease, unspecified whether esophagitis present    5. Skin ulcers of both feet      Plan:     Followed for SSc-ILD with PAH. On MMF and OFEV.  FVC/DLCO stable for the last two years.  Has known PH which is stable on Adcirca.     Continue to follow with GI for GERD/scleroderma esophagus. Encouraged continued sleeping on incline. Continue with PPI. Adequately controlled. Has follow up EGD/colonoscopy in 2025/2026.     Continue Adcirca and PH follow-up. Continue Lasix per PH team for lower extremity edema.      Interval improvement in SHAUNA cavitary lesion. New RUL 4mm pulmonary nodule (new since September 2023). Will follow up repeat CT chest done today.       Continue follow up with podiatry for bilateral skin ulcers on her feet.     5. RTC in 3 months or sooner if needed with repeat PFTs.       Lexa Bush NP  [facility]

## 2025-02-17 NOTE — LETTER
February 17, 2025        Luis Ahuja  [address]  [facility] [address]  Phone: [phone]  Fax: [phone]             [facility] 1st Fl  [address]  [facility] [address]  Phone: [phone]   Patient: Yamel Shah   MR Number: [id number]   YOB: 1950   Date of Visit: 2/17/2025       Dear Dr. Luis Ahuja    Thank you for referring Yamel Shah to me for evaluation. Attached you will find relevant portions of my assessment and plan of care.    If you have questions, please do not hesitate to call me. I look forward to following Yamel Shah along with you.    Sincerely,    Lexa Bush NP    Enclosure    If you would like to receive this communication electronically, please contact [email or web address] or [phone] to request [employer] access.    [employer] is a tool which provides read-only access to select patient information with whom you have a relationship. Its easy to use and provides real time access to review your patients record including encounter summaries, notes, results, and demographic information.    If you feel you have received this communication in error or would no longer like to receive these types of communications, please e-mail [email or web address]

## 2025-02-17 NOTE — TELEPHONE ENCOUNTER
----- Message from Roberta Leigh DO sent at 2/17/2025  1:18 PM CST -----  No changes to OFEV or MMF  ----- Message -----  From: Lex, Soft Lab Interface  Sent: 2/17/2025  11:11 AM CST  To: Roberta Leigh DO

## 2025-02-18 ENCOUNTER — PATIENT MESSAGE (OUTPATIENT)
Dept: TRANSPLANT | Facility: CLINIC | Age: 75
End: 2025-02-18
Payer: MEDICARE

## 2025-02-20 DIAGNOSIS — Z79.899 HIGH RISK MEDICATION USE: ICD-10-CM

## 2025-02-20 DIAGNOSIS — J84.9 ILD (INTERSTITIAL LUNG DISEASE): Primary | ICD-10-CM

## 2025-02-20 DIAGNOSIS — M34.9 SCLERODERMA WITH PULMONARY INVOLVEMENT: ICD-10-CM

## 2025-02-20 LAB
DLCO SINGLE BREATH LLN: 15.73
DLCO SINGLE BREATH PRE REF: 53 %
DLCO SINGLE BREATH REF: 21.46
DLCOC SBVA LLN: 2.82
DLCOC SBVA REF: 4.2
DLCOC SINGLE BREATH LLN: 15.73
DLCOC SINGLE BREATH REF: 21.46
DLCOCSBVAULN: 5.59
DLCOCSINGLEBREATHULN: 27.2
DLCOSINGLEBREATHULN: 27.2
DLCOSINGLEBREATHZSCORE: -2.9
DLCOVA LLN: 2.82
DLCOVA PRE REF: 98.2 %
DLCOVA PRE: 4.13 ML/(MIN*MMHG*L) (ref 2.82–5.59)
DLCOVA REF: 4.2
DLCOVAULN: 5.59
ERVN2 LLN: -16449.39
ERVN2 PRE REF: 82.6 %
ERVN2 PRE: 0.51 L (ref -16449.39–16450.61)
ERVN2 REF: 0.61
ERVN2ULN: ABNORMAL
FEF 25 75 LLN: 1.1
FEF 25 75 PRE REF: 54.5 %
FEF 25 75 REF: 2.5
FEV05 LLN: 0.85
FEV05 REF: 1.71
FEV1 FVC LLN: 65
FEV1 FVC PRE REF: 102.7 %
FEV1 FVC REF: 78
FEV1 LLN: 1.3
FEV1 PRE REF: 74.7 %
FEV1 REF: 1.88
FEV1FVCZSCORE: 0.29
FEV1ZSCORE: -1.34
FRCN2 LLN: 1.95
FRCN2 PRE REF: 63.5 %
FRCN2 REF: 2.77
FRCN2ULN: 3.59
FVC LLN: 1.7
FVC PRE REF: 72.2 %
FVC REF: 2.43
FVCZSCORE: -1.51
ICN2REF: 2.05
IVC PRE: 1.72 L (ref 1.7–3.21)
IVC SINGLE BREATH LLN: 1.7
IVC SINGLE BREATH PRE REF: 70.5 %
IVC SINGLE BREATH REF: 2.43
IVCSINGLEBREATHULN: 3.21
LLN IC N2: -16447.95
PEF LLN: 2.68
PEF PRE REF: 108.1 %
PEF REF: 4.75
PHYSICIAN COMMENT: ABNORMAL
PRE DLCO: 11.37 ML/(MIN*MMHG) (ref 15.73–27.2)
PRE FEF 25 75: 1.36 L/S (ref 1.1–3.9)
PRE FET 100: 5.68 SEC
PRE FEV05 REF: 69.8 %
PRE FEV1 FVC: 80.15 % (ref 64.73–89.58)
PRE FEV1: 1.41 L (ref 1.3–2.44)
PRE FEV5: 1.19 L (ref 0.85–2.56)
PRE FRC N2: 1.76 L (ref 1.95–3.59)
PRE FVC: 1.76 L (ref 1.7–3.21)
PRE IC N2: 1.25 L (ref -16447.95–16452.05)
PRE PEF: 5.14 L/S (ref 2.68–6.82)
PRE REF IC N2: 61.1 %
RVN2 LLN: 1.58
RVN2 PRE REF: 58.1 %
RVN2 PRE: 1.25 L (ref 1.58–2.73)
RVN2 REF: 2.16
RVN2TLCN2 LLN: 34.19
RVN2TLCN2 PRE REF: 95 %
RVN2TLCN2 PRE: 41.61 % (ref 34.19–53.37)
RVN2TLCN2 REF: 43.78
RVN2TLCN2ULN: 53.37
RVN2ULN: 2.73
TLCN2 LLN: 4.12
TLCN2 PRE REF: 59 %
TLCN2 PRE: 3.01 L (ref 4.12–6.09)
TLCN2 REF: 5.11
TLCN2ULN: 6.09
TLCN2ZSCORE: -3.49
ULN IC N2: ABNORMAL
VA PRE: 2.76 L (ref 4.96–4.96)
VA SINGLE BREATH LLN: 4.96
VA SINGLE BREATH PRE REF: 55.6 %
VA SINGLE BREATH REF: 4.96
VASINGLEBREATHULN: 4.96
VCMAXN2 LLN: 1.7
VCMAXN2 PRE REF: 72.2 %
VCMAXN2 PRE: 1.76 L (ref 1.7–3.21)
VCMAXN2 REF: 2.43
VCMAXN2ULN: 3.21

## 2025-02-21 ENCOUNTER — OFFICE VISIT (OUTPATIENT)
Dept: PODIATRY | Facility: CLINIC | Age: 75
End: 2025-02-21
Payer: MEDICARE

## 2025-02-21 VITALS
DIASTOLIC BLOOD PRESSURE: 65 MMHG | BODY MASS INDEX: 21.99 KG/M2 | WEIGHT: 132 LBS | SYSTOLIC BLOOD PRESSURE: 126 MMHG | HEIGHT: 65 IN | RESPIRATION RATE: 18 BRPM | HEART RATE: 88 BPM

## 2025-02-21 DIAGNOSIS — L03.116 CELLULITIS OF LEFT LOWER EXTREMITY: Primary | ICD-10-CM

## 2025-02-21 DIAGNOSIS — L97.912 ULCERS OF BOTH LOWER EXTREMITIES WITH FAT LAYER EXPOSED: ICD-10-CM

## 2025-02-21 DIAGNOSIS — L97.922 ULCERS OF BOTH LOWER EXTREMITIES WITH FAT LAYER EXPOSED: ICD-10-CM

## 2025-02-21 LAB
GRAM STN SPEC: NORMAL

## 2025-02-21 PROCEDURE — 87102 FUNGUS ISOLATION CULTURE: CPT | Mod: TXP | Performed by: STUDENT IN AN ORGANIZED HEALTH CARE EDUCATION/TRAINING PROGRAM

## 2025-02-21 PROCEDURE — 87205 SMEAR GRAM STAIN: CPT | Mod: TXP | Performed by: STUDENT IN AN ORGANIZED HEALTH CARE EDUCATION/TRAINING PROGRAM

## 2025-02-21 PROCEDURE — 87075 CULTR BACTERIA EXCEPT BLOOD: CPT | Mod: TXP | Performed by: STUDENT IN AN ORGANIZED HEALTH CARE EDUCATION/TRAINING PROGRAM

## 2025-02-21 PROCEDURE — 87070 CULTURE OTHR SPECIMN AEROBIC: CPT | Mod: TXP | Performed by: STUDENT IN AN ORGANIZED HEALTH CARE EDUCATION/TRAINING PROGRAM

## 2025-02-21 PROCEDURE — 87116 MYCOBACTERIA CULTURE: CPT | Mod: TXP | Performed by: STUDENT IN AN ORGANIZED HEALTH CARE EDUCATION/TRAINING PROGRAM

## 2025-02-21 PROCEDURE — 99214 OFFICE O/P EST MOD 30 MIN: CPT | Mod: PBBFAC | Performed by: STUDENT IN AN ORGANIZED HEALTH CARE EDUCATION/TRAINING PROGRAM

## 2025-02-21 NOTE — PROGRESS NOTES
Subjective:     Patient    Yamel Shah is a 74 y.o. female.    Problem    2024: Previously seen by multiple wound care providers without significant improvement in wounds, has bounced around a lot. Has bilateral foot wounds related to scleroderma and immunosuppressant medications. Had improvement in granulation with regular wound care, oral and topical antibiotics, one debridement. Attempted topical vitamin E but caused burning.     01/03/25: Returns for bilateral foot wound care. No new concerns. Had pain flare on left foot, still sensitive.     01/08/25: Returns for bilateral foot wound care. No new concerns.     01/16/25: Returns for bilateral foot wound care. Awaiting appointment with ID 02/14. No new concerns.     01/24/25: Returns for bilateral foot wound care. Pain controlled on current medications. No new concerns.     01/31/25: Returns for bilateral foot wound care. Not tolerating amitriptyline well; still with nerve pain, tingling, cramping, burning despite duloxetine, tolerates duloxetine well.     02/07/25: Returns for bilateral foot wound care. Increased duloxetine at last visit for BLE nerve pain with significant improvement in pain without side effects. ID appointment rescheduled to 02/24.     02/14/25: Returns for bilateral foot wound care. Wants to restart PO antibiotics until she sees ID, thinks she was progressing better at that time.     02/21/25: Returns for bilateral foot wound care. Wounds seem somewhat better on PO antibiotics, ID appointment next week.     History    History obtained from patient and review of medical records.     Past Medical History:   Diagnosis Date    Abnormal Pap smear     Acid reflux     Allergy     Arthritis     Encounter for blood transfusion     GERD (gastroesophageal reflux disease)     Hiatal hernia 03/24/2023    History of migraine headaches     Hx of colonic polyp     Hypertension     Idiopathic neuropathy 07/20/2012    ILD (interstitial lung  disease) 11/06/2013    Iron deficiency anemia 03/18/2014    MRSA carrier     Osteopenia     Pneumonia     Pulmonary fibrosis     Pulmonary hypertension     Raynaud's disease     Scleroderma, diffuse     Sjogren's syndrome     Vitamin D deficiency 11/14/2013       Past Surgical History:   Procedure Laterality Date    24 HOUR IMPEDANCE PH MONITORING OF ESOPHAGUS IN PATIENT NOT TAKING ACID REDUCING MEDICATIONS N/A 03/04/2020    Procedure: IMPEDANCE PH STUDY, ESOPHAGEAL, 24 HOUR, IN PATIENT NOT TAKING ACID REDUCING MEDICATION;  Surgeon: Annamaria Mendoza MD;  Location: [facility] ENDO (4TH FLR);  Service: Endoscopy;  Laterality: N/A;  OFF PPI/H2 Blocker   Motility Studies   Hold Narcotics x 1 days   Hold TCA x 1 days  2/26 - LVM attempting to confirm appt  2/27 - Confirmed appt    ANORECTAL MANOMETRY N/A 05/10/2021    Procedure: MANOMETRY, ANORECTAL with balloon expulsion test;  Surgeon: Annamaria Mendoza MD;  Location: [facility] ENDO (4TH FLR);  Service: Endoscopy;  Laterality: N/A;  order combined  covid test 5/7 Jew, instructions emailed-[facility]    BREAST BIOPSY      Left, benign    BRONCHOSCOPY N/A 10/2/2023    Procedure: bronch;  Surgeon: Roberta Leigh DO;  Location: [facility] OR 2ND FLR;  Service: Endoscopy;  Laterality: N/A;    BRONCHOSCOPY N/A 9/16/2024    Procedure: Flexible bronchoscopy (BAL only);  Surgeon: Roberta Leigh DO;  Location: [facility] OR 2ND FLR;  Service: Endoscopy;  Laterality: N/A;    CERVICAL CONIZATION   W/ LASER  1970    COLONOSCOPY      COLONOSCOPY N/A 03/29/2019    Procedure: COLONOSCOPY;  Surgeon: Annamaria Mendoza MD;  Location: [facility] ENDO (2ND FLR);  Service: Endoscopy;  Laterality: N/A;    COLONOSCOPY N/A 05/10/2021    Procedure: COLONOSCOPY;  Surgeon: Annamaria Mendoza MD;  Location: [facility] ENDO (4TH FLR);  Service: Endoscopy;  Laterality: N/A;  2nd floor-previous scopes done on 2nd floor, gastroparesis  full liquid diet x2 days, clear liquid x1 day prior to procedure  covid test 5/7 Jew,  instructions emailed-[facility]  5/6 pt confirmed appt-[facility]    DILATION AND CURETTAGE OF UTERUS      ESOPHAGEAL MANOMETRY WITH MEASUREMENT OF IMPEDANCE N/A 03/04/2020    Procedure: MANOMETRY, ESOPHAGUS, WITH IMPEDANCE MEASUREMENT;  Surgeon: Annamaria Mendoza MD;  Location: [facility] (4TH FLR);  Service: Endoscopy;  Laterality: N/A;  OFF PPI/H2 Blocker   Motility Studies   Hold Narcotics x 1 days   Hold TCA x 1 days    ESOPHAGOGASTRODUODENOSCOPY      ESOPHAGOGASTRODUODENOSCOPY N/A 03/29/2019    Procedure: EGD (ESOPHAGOGASTRODUODENOSCOPY);  Surgeon: Annamaria Mendoza MD;  Location: [facility] (2ND FLR);  Service: Endoscopy;  Laterality: N/A;  pulmonary htn    ESOPHAGOGASTRODUODENOSCOPY N/A 02/02/2021    Procedure: ESOPHAGOGASTRODUODENOSCOPY (EGD);  Surgeon: Annamaria Mendoza MD;  Location: [facility] (2ND FLR);  Service: Endoscopy;  Laterality: N/A;  2nd floor gastroparesis  3 days full liquid diet and 1 day clears  covid test 1/30 primary care, instructions sent to [facility]    ESOPHAGOGASTRODUODENOSCOPY N/A 07/01/2022    Procedure: ESOPHAGOGASTRODUODENOSCOPY (EGD);  Surgeon: Annamaria Mendoza MD;  Location: [facility] (2ND FLR);  Service: Endoscopy;  Laterality: N/A;  2nd floor-gastroparesis  full liquid diet x3 days, clear liquid diet x1 day prior to procedure  fully vaccinated, instructions sent to [email or web address]  6/29-pt confirmed new arrival time-[facility] [place]    EXCISION, LESION, STOMACH, LAPAROSCOPIC N/A 03/23/2023    Procedure: XI ROBOTIC EXCISION, LESION, STOMACH;  Surgeon: Katherine Segura MD;  [facility];  Service: General;  Laterality: N/A;    EXCISIONAL BIOPSY, LYMPH NODE Right 05/26/2023    Procedure: EXCISIONAL BIOPSY, LYMPH NODE, INGUINAL;  Surgeon: Katherine Segura MD;  [facility];  Service: General;  Laterality: Right;    HYSTERECTOMY  1990    FRANDY (AUB, Fibroids), ovaries remain    REPAIR, HERNIA, UMBILICAL N/A 03/23/2023    Procedure: REPAIR, HERNIA, UMBILICAL;   Surgeon: Katherine Segura MD;  Location: [facility] OR [facility] FLR;  Service: General;  Laterality: N/A;    RIGHT HEART CATHETERIZATION Right 01/05/2021    Procedure: INSERTION, CATHETER, RIGHT HEART;  Surgeon: Aureliano Sy MD;  Location: [facility] CATH LAB;  Service: Cardiology;  Laterality: Right;    RIGHT HEART CATHETERIZATION Right 03/16/2023    Procedure: INSERTION, CATHETER, RIGHT HEART;  Surgeon: Aureliano Sy MD;  Location: [facility] CATH LAB;  Service: Cardiology;  Laterality: Right;    RIGHT HEART CATHETERIZATION Right 11/22/2024    Procedure: INSERTION, CATHETER, RIGHT HEART;  Surgeon: Tamanna Martins MD;  Location: [facility] CATH LAB;  Service: Cardiology;  Laterality: Right;    ROBOT-ASSISTED LAPAROSCOPIC REPAIR OF INGUINAL HERNIA USING DA VERNELL XI Right 05/26/2023    Procedure: XI ROBOTIC REPAIR, HERNIA, INGUINAL, FEMORAL;  Surgeon: Katherine Segura MD;  [address]  Service: General;  Laterality: Right;    ROBOT-ASSISTED REPAIR OF HIATAL HERNIA USING DA VERNELL XI N/A 03/23/2023    Procedure: XI ROBOTIC REPAIR, HERNIA, HIATAL;  Surgeon: Katherine Segura MD;  [facility];  Service: General;  Laterality: N/A;    VARICOSE VEIN SURGERY      XI ROBOTIC GASTROPEXY N/A 03/23/2023    Procedure: XI ROBOTIC GASTROPEXY;  Surgeon: Katherine Segura MD;  [facility];  Service: General;  Laterality: N/A;    XI ROBOTIC PYLOROMYOTOMY N/A 03/23/2023    Procedure: XI ROBOTIC PYLOROMYOTOMY;  Surgeon: Katherine Segura MD;  [facility];  Service: General;  Laterality: N/A;        Objective:     Vitals  Wt Readings from Last 1 Encounters:   02/21/25 59.9 kg (132 lb)     Temp Readings from Last 1 Encounters:     BP Readings from Last 1 Encounters:   02/21/25 126/65     Pulse Readings from Last 1 Encounters:   02/21/25 88       Dermatological Exam    Skin:  Skin of both feet thickened (scleroderma), stiff, with extensive ulcerations  of both feet down to fat with slowly improving granulation; xerosis       Nails:  10 nail(s) thickened, and 10 nail(s) discolored    Vascular Exam    Arteries:  Posterior tibial artery palpable on right  Dorsalis pedis artery palpable on right  Posterior tibial artery palpable on left  Dorsalis pedis artery palpable on left    Veins:  Superficial veins unremarkable on right  Superficial veins unremarkable on left    Swellin+ nonpitting on right  1+ nonpitting on left    Neurological Exam    Niland touch test:  6/6 sites sensed, light touch intact     Musculoskeletal Exam    Footwear:  Casual on right  Casual on left    Gait Exam:   Ambulatory Status: Ambulatory  Gait: Normal  Assistive Devices: None    Foot Progression Angle:  Normal on right  Normal on left    Right Lower Extremity Additional Findings:  Right foot and ankle function, strength, and range of motion unremarkable except as noted above.     Left Lower Extremity Additional Findings:  Left foot and ankle function, strength, and range of motion unremarkable except as noted above.    Imaging and Other Tests    Imaging:  Independently reviewed and interpreted imaging, findings are as follows: N/A     Assessment:     Encounter Diagnoses   Name Primary?    Cellulitis of left lower extremity Yes    Ulcers of both lower extremities with fat layer exposed        Plan:     I counseled the patient on her conditions, their implications and medical management.    Neuropathy: chronic stable  -Failed amitriptyline.   -Continue duloxetine 120 mg/day.      Bilateral ulcers, cellulitis, swelling: chronic stable   -Dressings changed. Leave intact as long as possible then continue usual dressing changes.   -Protected WB in surgical shoes.    -Continue PO antibiotics.   -New culture collected for antibiotic guidance, followup with ID as scheduled.      Scleroderma: chronic stable  -Continue PO tadalafil 40 mg/day.      I spent a total of 40 minutes on the day of the  visit.  This includes face to face time and non-face to face time preparing to see the patient (eg, review of tests), obtaining and/or reviewing separately obtained history, documenting clinical information in the electronic or other health record, independently interpreting results and communicating results to the patient/family/caregiver, or care coordinator.     Return to clinic in 1-2 weeks, call sooner PRN.

## 2025-02-22 LAB
BACTERIA SPEC AEROBE CULT: NORMAL
MYCOBACTERIUM SPEC QL CULT: NORMAL

## 2025-02-24 ENCOUNTER — OFFICE VISIT (OUTPATIENT)
Dept: RHEUMATOLOGY | Facility: CLINIC | Age: 75
End: 2025-02-24
Payer: MEDICARE

## 2025-02-24 VITALS
SYSTOLIC BLOOD PRESSURE: 119 MMHG | WEIGHT: 141.75 LBS | HEART RATE: 76 BPM | DIASTOLIC BLOOD PRESSURE: 68 MMHG | BODY MASS INDEX: 23.62 KG/M2 | HEIGHT: 65 IN

## 2025-02-24 DIAGNOSIS — M34.9 SCLERODERMA: Primary | ICD-10-CM

## 2025-02-24 DIAGNOSIS — I87.2 VENOUS INSUFFICIENCY OF BOTH LOWER EXTREMITIES: ICD-10-CM

## 2025-02-24 DIAGNOSIS — L97.519 SKIN ULCERS OF BOTH FEET: ICD-10-CM

## 2025-02-24 DIAGNOSIS — Z79.899 IMMUNOSUPPRESSION DUE TO DRUG THERAPY: ICD-10-CM

## 2025-02-24 DIAGNOSIS — I73.9 PVD (PERIPHERAL VASCULAR DISEASE): ICD-10-CM

## 2025-02-24 DIAGNOSIS — I27.29 PULMONARY HYPERTENSION ASSOCIATED WITH SYSTEMIC DISORDER: ICD-10-CM

## 2025-02-24 DIAGNOSIS — D84.821 IMMUNOSUPPRESSION DUE TO DRUG THERAPY: ICD-10-CM

## 2025-02-24 DIAGNOSIS — L97.529 SKIN ULCERS OF BOTH FEET: ICD-10-CM

## 2025-02-24 DIAGNOSIS — I73.00 RAYNAUD'S DISEASE WITHOUT GANGRENE: ICD-10-CM

## 2025-02-24 DIAGNOSIS — J84.9 ILD (INTERSTITIAL LUNG DISEASE): ICD-10-CM

## 2025-02-24 PROCEDURE — 99999 PR PBB SHADOW E&M-EST. PATIENT-LVL III: CPT | Mod: PBBFAC,GC,TXP, | Performed by: STUDENT IN AN ORGANIZED HEALTH CARE EDUCATION/TRAINING PROGRAM

## 2025-02-24 PROCEDURE — 99213 OFFICE O/P EST LOW 20 MIN: CPT | Mod: PBBFAC,TXP | Performed by: STUDENT IN AN ORGANIZED HEALTH CARE EDUCATION/TRAINING PROGRAM

## 2025-02-24 PROCEDURE — 99215 OFFICE O/P EST HI 40 MIN: CPT | Mod: S$PBB,GC,NTX, | Performed by: STUDENT IN AN ORGANIZED HEALTH CARE EDUCATION/TRAINING PROGRAM

## 2025-02-24 ASSESSMENT — ROUTINE ASSESSMENT OF PATIENT INDEX DATA (RAPID3)
PAIN SCORE: 5
PSYCHOLOGICAL DISTRESS SCORE: 3.3
PATIENT GLOBAL ASSESSMENT SCORE: 5
FATIGUE SCORE: 4.5
AM STIFFNESS SCORE: 0, NO
TOTAL RAPID3 SCORE: 4.44
MDHAQ FUNCTION SCORE: 1

## 2025-02-24 NOTE — PATIENT INSTRUCTIONS
Do not take the mycophenolate / cellcept when you are on the antibiotics, you can resume taking it after you stop the antibiotics    Please give the oral surgeon Dr. Ahuja's phone number: [phone]    Fellow Dr. Bennett: [phone]

## 2025-02-24 NOTE — PROGRESS NOTES
I have personally reviewed the history, confirmed exam findings, and discussed assessment and plan with fellow.       PFTs       FVC     SVC      RV     DLco    TLC       2/17/25   72.2                  58.1    53          59  8/22/24   61.9                  55.2    49.5      58.6  3/4/24     75.1                  47.2    40.4      63.3  9/18/23   66.1                  44.2    42.1      52.8  5/22/23   71.1                   68.5   52.3      66.8  10/7/22   67.6                    63.1  68.9      62.7  1/3/22     67.5   5/25/21   67.8                    59.3   64.1      62.4  1/10/20    74                                72  3/12/19    60         64         61      82  3/6/18      64         70         64      112  4/8/16      70         70         89       84  9/4/15      66         62         72       71  1/19/15    66              EXAMINATION:  CT CHEST WITHOUT CONTRAST     CLINICAL HISTORY:  Interstitial lung disease;pulmonary nodule; Interstitial pulmonary disease, unspecified     TECHNIQUE:  Low dose axial images, sagittal and coronal reformations were obtained from the thoracic inlet to the lung bases. Contrast was not administered.     COMPARISON:  10/25/2024 and additional prior     FINDINGS:  Support tubes and lines: None.     Aorta: Normal caliber.     Heart: Normal size.     Coronary arteries: No coronary artery calcifications.     Pericardium: Normal. No effusion, thickening, or calcification.     Central pulmonary arteries: Normal caliber.     Base of neck/thyroid: Unremarkable.     Lymph nodes: Mediastinal lymph nodes are present that, although not increased in size, are increased in number.  This finding is not substantially changed going back as far as 06/02/2023     Esophagus: Fluid is present in the esophagus to the level of the thoracic inlet     Pleura: No effusion, thickening, or calcification.     Upper abdomen: Unremarkable.     Body wall: Unremarkable.     Airways: There is bibasilar  bronchiectasis and bronchiolectasis, most pronounced in the lower lobes, to a similar degree going back at least as far as 06/02/2023.     Lungs: Well-demarcated areas of ground-glass opacification are present in the lower lobes to a similar degree when compared to exams going back at least as far as 06/02/2023.     Mild honeycombing is noted in the subpleural regions of the anterior portions of the upper lobes, unchanged when compared to 09/13/2024.     A 9 mm irregular nodule in the right upper lobe (series 4, image 183) is unchanged when compared to the 09/13/2024 but new when compared to more remote imaging.     An irregular nodule in the right upper lobe measuring 17 x 4 mm (series 4, image 160) is stable to slightly decreased in size when compared to 06/02/2023.  Additional irregular nodules are stable.     Irregular nodule in the left upper lobe measuring 1.5 x 0.8 cm (series 4, image 117) is stable when compared to 10/25/2024 but decreased in size when compared to 09/13/2024.  Substantially decreased in size when compared to 10/25/2024.     Bones: Unremarkable.     Impression:     1. Irregular 9 mm nodule in the right upper lobe, increased in size when compared to 10/25/2024 and new when compared to 09/08/2023.  Given the context of multiple waxing and waning irregular nodules and airspace opacities, this is favored to be secondary to an underlying infectious etiology.  However, continued attention on follow-up.  2. No change in the irregular left upper lobe nodule when compared to 10/25/2024 but substantially decreased in size when compared to 09/13/2024.  3. No change in the basal predominant ground glass opacification and traction bronchiectasis/bronchiolectasis going back at least as far as 6/2/2023.        Electronically signed by:Irina Brush  Date:                                            02/17/2025  Time:                                           13:11              TTE 3/15/24:             Left  Ventricle: The left ventricle is normal in size. Normal wall thickness. Normal wall motion. There is normal systolic function with a visually estimated ejection fraction of 55 - 60%. There is normal diastolic function.    Right Ventricle: Normal right ventricular cavity size. Wall thickness is normal. Right ventricle wall motion  is normal. Systolic function is normal.    The left atrium is moderately dilated.    Tricuspid Valve: There is mild regurgitation.    Pulmonary Artery: The estimated pulmonary artery systolic pressure is 43 mmHg.    IVC/SVC: Intermediate venous pressure at 8 mmHg.     RHC 11/22/4:   Right Heart Pressures    RA: 8/ 6/ 6 RV: 32/ 3/ 8 PA: 32/ 10/ 18 PWP: 15/ 16/ 12 .   Cardiac output was 4.92  by Ivana. Cardiac index is 2.92 L/min/m2.       Thermodilution CO/CI 5.64/3.34.   Normal right and left sided filling pressures.   No pulmonary hypertension present.   TPG  6   PVR  1.22 ivana/ 1.06        Saw Lexa Bush in [facility] 2/17/25:    Assessment:  1. ILD (interstitial lung disease)    2. Scleroderma with pulmonary involvement    3. WHO group 1 pulmonary arterial hypertension    4. Gastroesophageal reflux disease, unspecified whether esophagitis present    5. Skin ulcers of both feet       Plan:      Followed for SSc-ILD with PAH. On MMF and OFEV.  FVC/DLCO stable for the last two years.  Has known PH which is stable on Adcirca.      Continue to follow with GI for GERD/scleroderma esophagus. Encouraged continued sleeping on incline. Continue with PPI. Adequately controlled. Has follow up EGD/colonoscopy in 2025/2026.      Continue Adcirca and PH follow-up. Continue Lasix per PH team for lower extremity edema.       Interval improvement in SHAUNA cavitary lesion. New RUL 4mm pulmonary nodule (new since September 2023). Will follow up repeat CT chest done today.       Continue follow up with podiatry for bilateral skin ulcers on her feet.      5. RTC in 3 months or sooner if needed with repeat PFTs.          Lexa Bush NP     Saw Dr. Benites in Gastro 5/23/24:    Assessment:   1. Scleroderma with pulmonary involvement    2. Dysphagia, unspecified type    3. Scleroderma    4. Gastroesophageal reflux disease, unspecified whether esophagitis present          Plan:  -Increase Rabeprazole to 20mg twice per day for uncontrolled GERD.  -Plan for EGD and colonoscopy in late 2025/early 2026 for Pagan's esophagus and colon polyp surveillance.      ASSESSMENT:       dcSSc MRSS 10 compared with  12 last visit in July 2024 and 14 prior to that  ILD  significantly improved FVC other PFTs stable; HRCT chest stable since 2023  Now clinically respiratory status  approximately at baseline,   Chronic foot ulcers, venous stasis, venous insufficiency and scleroderma with Pseudomonas growth left wound culture. Dr. Elijah yanez on Augmentin and Levaquine  PH normal PAP on  RHC 3/16/23  ROXANNE                    PLAN:      To see Dr. Cruz in ID today  Hold MMF suspension   1000 mg twice daily until completes antibiotics  Arterial and venous Doppler US LEs  F/u Gastro Dr Benites for dysphagia and ROXANNE last seen 5/23/24  Continue nintedanib 100mg twice daily       No need to add rituximab as recent exacerbation infectious rather than rapid progession of ILD    F/u Dr. Nava in  Podiatry for foot ulcers with Pseudomomas infection left   RTC 3 months with standing labs

## 2025-02-24 NOTE — PROGRESS NOTES
2/23/2025     3:35 PM   Rapid3 Question Responses and Scores   MDHAQ Score 1   Psychologic Score 3.3   Pain Score 5   When you awakened in the morning OVER THE LAST WEEK, did you feel stiff? No   Fatigue Score 4.5   Global Health Score 5   RAPID3 Score 4.44    Answers submitted by the patient for this visit:  Rheumatology Questionnaire (Submitted on 2/23/2025)  fever: No  eye redness: No  mouth sores: No  headaches: No  shortness of breath: Yes  chest pain: No  trouble swallowing: Yes  diarrhea: No  constipation: No  unexpected weight change: Yes  genital sore: No  During the last 3 days, have you had a skin rash?: No  Bruises or bleeds easily: No  cough: No

## 2025-02-24 NOTE — PROGRESS NOTES
Subjective:      Patient ID: Yamel Shah is a 74 y.o. female.    Chief Complaint: follow up for scleroderma     Yamel Shah is a 75yo F with PMH of systemic sclerosis with interstitial lung disease, HFpEF, BLE venous insufficiency with chronic ulcers/wounds, s/p laser venous ablation (12/2020), GERD, Pagan's esophagus, s/p hernia repair (5/2023).    Rheum History:  - SSc with ILD dx in 1983  - Serologies: +SSA, +Scl-70  - Previously followed with podiatry and vascular surgery for the venous insufficiency and wounds, but now just cares for them herself  - Has chronically elevated sed rate and CRP  - RHC/LHC (3/2023) with normal pressures   - Follows with pulmonology Dr. Leigh, who was considering initiation of anti fibrotic therapy  - Follows with cardiology, last saw Claire Pelaez NP (9/2023)  - Was dx with covid while on a cruise on 8/28, was hospitalized in [place] for supportive care  - Had prolonged course of symptoms/illness from covid  - At 10/2023 visit, pt was feeling close to her baseline  - Most recent PFTs, 6MWT, TTE were done in 3/2024  - Has been dealing with respiratory infection again most recently as detailed below    Current Treatments:  - Mycophenolate mofetil liquid suspension 1000mg BID (2019-current)  - Ofev per pulm (4/2024-current)  - Tadalafil per pulm   - Rabeprazole 40mg daily per GI    Interval History:  Pt is here for follow up today. Reports doing well overall. No recent illness, cough, cold, or notable change in baseline symptoms. She deals with some chronic pain in the BLE around where she has the known wounds. The wounds were healing well. However, last month, they noticed some increased erythema, so the wound care doctor did perform cultures which showed some bacterial growth (Pseudomonas). He is having her be evaluated by ID today and started her on augmentin and levaquin about 2 weeks ago, which she has been taking. They did not realize to hold the  "cellcept while on abx.     Pt denies any new skin changes, she feels her chronic skin changes have actually improved slightly. Ulcers on the feet/ankles are otherwise improving now per the wound care specialist. Rarely has acid reflux and sometimes solid foods have gotten stuck in the esophagus, but this has not occurred as much recently.    Review of Systems   Constitutional:  Positive for unexpected weight change. Negative for fever.   HENT:  Positive for trouble swallowing. Negative for mouth sores.    Eyes:  Negative for redness.   Respiratory:  Positive for shortness of breath. Negative for cough.    Cardiovascular:  Negative for chest pain.   Gastrointestinal:  Negative for constipation and diarrhea.   Genitourinary:  Negative for genital sores.   Skin:  Negative for rash.   Neurological:  Negative for headaches.   Hematological:  Does not bruise/bleed easily.      Objective:   /68 (BP Location: Right arm)   Pulse 76   Ht 5' 5" (1.651 m)   Wt 64.3 kg (141 lb 12.1 oz)   BMI 23.59 kg/m²   Physical Exam   Constitutional: She is oriented to person, place, and time. She appears well-developed and well-nourished. No distress.   HENT:   Head: Normocephalic and atraumatic.   Right Ear: External ear normal.   Left Ear: External ear normal.   Nose: Nose normal. No nasal congestion.   Mouth/Throat: Mucous membranes are moist. Oropharynx is clear.   Eyes: Conjunctivae are normal.   Cardiovascular: Normal rate, regular rhythm and normal heart sounds.   No murmur heard.  Pulmonary/Chest: Effort normal. No stridor. No respiratory distress. She has no wheezes.   Pulmonary Comments: Velcro crackles present primarily at the lung bases, stable at baseline.  Abdominal: Soft. She exhibits no distension. There is no abdominal tenderness.   Musculoskeletal:      Cervical back: Normal range of motion and neck supple.      Comments: No synovitis in any of the small or large joints. There is limited ROM of the hands and feet " in setting of chronic scleroderma skin changes. 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 Thickened/tightened skin in multiple areas as detailed below in skin assessment, this has improved over the past year. Bilateral feet with chronic slow healing wounds, wrapped.   Neurological: She is alert and oriented to person, place, and time.   Skin: Skin is warm and dry.   MRSS 12. There is sclerodactyly with chronic skin thickened changes. Fingertips are dry. No pitting ulcers. Bilateral feet/ankles with chronic skin thickening and ulcer changes. No significant warmth, erythema, purulence.   Psychiatric: Her behavior is normal. Mood normal.   Vitals reviewed.    Modified Skin Score:    Face= 0  Chest= 0  Abdomen=0         Right Upper Arm= 0     Left Upper Arm= 0  Right Lower Arm= 0     Left Lower Arm= 0  Right Hand= 0              Left Hand= 0  Right Fingers= 1          Left Fingers= 1  Right Upper Leg= 0     Left Upper Leg=0  Right Lower Leg= 1     Left Lower Leg= 1  Right Foot= 3               Left Foot= 3     TOTAL: 10 (improved from 12 at last visit, previously at 14)     Assessment:     1. Scleroderma    2. Raynaud's disease without gangrene    3. Venous insufficiency of both lower extremities    4. Pulmonary hypertension associated with systemic disorder    5. ILD (interstitial lung disease)    6. PVD (peripheral vascular disease)    7. Immunosuppression due to drug therapy    8. Skin ulcers of both feet      - Pt with established SSc with ILD for many years  - Had previously been dealing with prolonged respiratory infections, previously in August-September 2023 and again in April 2024  - Lung functions have now returned to baseline after clearing these infectious issues and have continued to remain stable  - She follows regularly with pulmonology, Dr. Leigh as well as the PH clinic  - Echo and lung function studies have been stable most recently  - Now with possible infection/bacterial presence in chronic BLE  wounds - undergoing ID workup/evaluation    Plan:     Problem List Items Addressed This Visit       Pulmonary hypertension associated with systemic disorder (Chronic)    Relevant Orders    Ankle Brachial Indices (PEMA)    US Lower Extremity Arteries Bilateral    US Lower Extremity Veins Bilateral    Venous insufficiency of both lower extremities    Relevant Orders    Ankle Brachial Indices (PEMA)    US Lower Extremity Arteries Bilateral    US Lower Extremity Veins Bilateral    ILD (interstitial lung disease)    Relevant Orders    Ankle Brachial Indices (PEMA)    US Lower Extremity Arteries Bilateral    US Lower Extremity Veins Bilateral    Skin ulcers of both feet    Relevant Orders    Ankle Brachial Indices (PEMA)    US Lower Extremity Arteries Bilateral    US Lower Extremity Veins Bilateral    Scleroderma - Primary    Relevant Orders    Ankle Brachial Indices (PEMA)    US Lower Extremity Arteries Bilateral    US Lower Extremity Veins Bilateral    Raynaud's disease without gangrene    Relevant Orders    Ankle Brachial Indices (PEMA)    US Lower Extremity Arteries Bilateral    US Lower Extremity Veins Bilateral    PVD (peripheral vascular disease)    Relevant Orders    Ankle Brachial Indices (PEMA)    US Lower Extremity Arteries Bilateral    US Lower Extremity Veins Bilateral    Immunosuppression due to drug therapy    Relevant Orders    Ankle Brachial Indices (PEMA)    US Lower Extremity Arteries Bilateral    US Lower Extremity Veins Bilateral     - Hold MMF 1000mg BID while on antibiotics, can resume once abx completed  - Continue with ofev and adcirca per pulm/PH teams and PPI per GI  - At this time, pt does not appear to require additional immunosuppressive agents  - If she does need further immune therapy in the future, could consider rituximab  - Our contact information was provided to pt and  today, so they can provide to oral surgeons/dentistry teams if needed to discuss possible future dental extraction and  implant plans  - Would always advise caution with any elective procedures and only plan to do those in times of disease stability and no active infections  - Obtain BLE PEMA as well as arterial and venous US to evaluate vasculature in setting of chronic wounds and underlying scleroderma     Follow up here in clinic in about 3 months or earlier if needed.    Assessment and plan discussed with supervising attending, Dr. Ahuja.      Saima Bennett MD  PGY-5, Rheumatology

## 2025-02-25 ENCOUNTER — OFFICE VISIT (OUTPATIENT)
Dept: PODIATRY | Facility: CLINIC | Age: 75
End: 2025-02-25
Payer: MEDICARE

## 2025-02-25 VITALS
HEART RATE: 91 BPM | SYSTOLIC BLOOD PRESSURE: 119 MMHG | BODY MASS INDEX: 23.59 KG/M2 | DIASTOLIC BLOOD PRESSURE: 68 MMHG | HEIGHT: 65 IN

## 2025-02-25 DIAGNOSIS — M20.42 HAMMERTOE OF LEFT FOOT: ICD-10-CM

## 2025-02-25 DIAGNOSIS — L97.922 ULCERS OF BOTH LOWER EXTREMITIES WITH FAT LAYER EXPOSED: Primary | ICD-10-CM

## 2025-02-25 DIAGNOSIS — L97.912 ULCERS OF BOTH LOWER EXTREMITIES WITH FAT LAYER EXPOSED: Primary | ICD-10-CM

## 2025-02-25 PROCEDURE — 99214 OFFICE O/P EST MOD 30 MIN: CPT | Mod: S$PBB,,, | Performed by: STUDENT IN AN ORGANIZED HEALTH CARE EDUCATION/TRAINING PROGRAM

## 2025-02-25 PROCEDURE — 99999 PR PBB SHADOW E&M-EST. PATIENT-LVL V: CPT | Mod: PBBFAC,,, | Performed by: STUDENT IN AN ORGANIZED HEALTH CARE EDUCATION/TRAINING PROGRAM

## 2025-02-25 PROCEDURE — 99215 OFFICE O/P EST HI 40 MIN: CPT | Mod: PBBFAC | Performed by: STUDENT IN AN ORGANIZED HEALTH CARE EDUCATION/TRAINING PROGRAM

## 2025-02-25 RX ORDER — ACETAMINOPHEN AND CODEINE PHOSPHATE 300; 60 MG/1; MG/1
1 TABLET ORAL NIGHTLY
Qty: 30 TABLET | Refills: 0 | Status: SHIPPED | OUTPATIENT
Start: 2025-02-25 | End: 2025-03-27

## 2025-02-25 NOTE — PROGRESS NOTES
Subjective:     Patient    Yamel Shah is a 74 y.o. female.    Problem    2024: Previously seen by multiple wound care providers without significant improvement in wounds, has bounced around a lot. Has bilateral foot wounds related to scleroderma and immunosuppressant medications. Had improvement in granulation with regular wound care, oral and topical antibiotics, one debridement. Attempted topical vitamin E but caused burning.     01/03/25: Returns for bilateral foot wound care. No new concerns. Had pain flare on left foot, still sensitive.     01/08/25: Returns for bilateral foot wound care. No new concerns.     01/16/25: Returns for bilateral foot wound care. Awaiting appointment with ID 02/14. No new concerns.     01/24/25: Returns for bilateral foot wound care. Pain controlled on current medications. No new concerns.     01/31/25: Returns for bilateral foot wound care. Not tolerating amitriptyline well; still with nerve pain, tingling, cramping, burning despite duloxetine, tolerates duloxetine well.     02/07/25: Returns for bilateral foot wound care. Increased duloxetine at last visit for BLE nerve pain with significant improvement in pain without side effects. ID appointment rescheduled to 02/24.     02/14/25: Returns for bilateral foot wound care. Wants to restart PO antibiotics until she sees ID, thinks she was progressing better at that time.     02/21/25: Returns for bilateral foot wound care. Wounds seem somewhat better on PO antibiotics, ID appointment next week.     02/25/25: Returns for bilateral foot wound care. Completed recent PO antibiotics, recent cultures + for Pseudomonas resistant to PO antibiotics; has first ID appointment tomorrow.     History    History obtained from patient and review of medical records.     Past Medical History:   Diagnosis Date    Abnormal Pap smear     Acid reflux     Allergy     Arthritis     Encounter for blood transfusion     GERD  (gastroesophageal reflux disease)     Hiatal hernia 03/24/2023    History of migraine headaches     Hx of colonic polyp     Hypertension     Idiopathic neuropathy 07/20/2012    ILD (interstitial lung disease) 11/06/2013    Iron deficiency anemia 03/18/2014    MRSA carrier     Osteopenia     Pneumonia     Pulmonary fibrosis     Pulmonary hypertension     Raynaud's disease     Scleroderma, diffuse     Sjogren's syndrome     Vitamin D deficiency 11/14/2013       Past Surgical History:   Procedure Laterality Date    24 HOUR IMPEDANCE PH MONITORING OF ESOPHAGUS IN PATIENT NOT TAKING ACID REDUCING MEDICATIONS N/A 03/04/2020    Procedure: IMPEDANCE PH STUDY, ESOPHAGEAL, 24 HOUR, IN PATIENT NOT TAKING ACID REDUCING MEDICATION;  Surgeon: Annamaria Mendoza MD;  Location: [facility] (4TH FLR);  Service: Endoscopy;  Laterality: N/A;  OFF PPI/H2 Blocker   Motility Studies   Hold Narcotics x 1 days   Hold TCA x 1 days  2/26 - LVM attempting to confirm appt  2/27 - Confirmed appt    ANORECTAL MANOMETRY N/A 05/10/2021    Procedure: MANOMETRY, ANORECTAL with balloon expulsion test;  Surgeon: Annamaria Mendoza MD;  Location: [facility] (4TH FLR);  Service: Endoscopy;  Laterality: N/A;  order combined  covid test 5/7 [employer], instructions emailed-[facility]    BREAST BIOPSY      Left, benign    BRONCHOSCOPY N/A 10/2/2023    Procedure: bronch;  Surgeon: Roberta Leigh DO;  Location: [facility] OR 2ND FLR;  Service: Endoscopy;  Laterality: N/A;    BRONCHOSCOPY N/A 9/16/2024    Procedure: Flexible bronchoscopy (BAL only);  Surgeon: Roberta Leigh DO;  Location: [facility] OR 2ND FLR;  Service: Endoscopy;  Laterality: N/A;    CERVICAL CONIZATION   W/ LASER  1970    COLONOSCOPY      COLONOSCOPY N/A 03/29/2019    Procedure: COLONOSCOPY;  Surgeon: Annamaria Mendoza MD;  Location: [facility] ENDO (2ND FLR);  Service: Endoscopy;  Laterality: N/A;    COLONOSCOPY N/A 05/10/2021    Procedure: COLONOSCOPY;  Surgeon: Annamaria Mendoza MD;  Location: [facility]  (4TH FLR);  Service: Endoscopy;  Laterality: N/A;  2nd floor-previous scopes done on 2nd floor, gastroparesis  full liquid diet x2 days, clear liquid x1 day prior to procedure  covid test 5/7 [facility], instructions emailed-[facility]  5/6 pt confirmed appt-[facility]    DILATION AND CURETTAGE OF UTERUS      ESOPHAGEAL MANOMETRY WITH MEASUREMENT OF IMPEDANCE N/A 03/04/2020    Procedure: MANOMETRY, ESOPHAGUS, WITH IMPEDANCE MEASUREMENT;  Surgeon: Annamaria Mendoza MD;  Location: [facility] (4TH FLR);  Service: Endoscopy;  Laterality: N/A;  OFF PPI/H2 Blocker   Motility Studies   Hold Narcotics x 1 days   Hold TCA x 1 days    ESOPHAGOGASTRODUODENOSCOPY      ESOPHAGOGASTRODUODENOSCOPY N/A 03/29/2019    Procedure: EGD (ESOPHAGOGASTRODUODENOSCOPY);  Surgeon: Annamaria Mendoza MD;  Location: [facility] (2ND FLR);  Service: Endoscopy;  Laterality: N/A;  pulmonary htn    ESOPHAGOGASTRODUODENOSCOPY N/A 02/02/2021    Procedure: ESOPHAGOGASTRODUODENOSCOPY (EGD);  Surgeon: Annamaria Mendoza MD;  Location: [facility] (2ND FLR);  Service: Endoscopy;  Laterality: N/A;  2nd floor gastroparesis  3 days full liquid diet and 1 day clears  covid test 1/30 primary care, instructions sent to [facility]    ESOPHAGOGASTRODUODENOSCOPY N/A 07/01/2022    Procedure: ESOPHAGOGASTRODUODENOSCOPY (EGD);  Surgeon: Annamaria Mendoza MD;  Location: [facility] (2ND FLR);  Service: Endoscopy;  Laterality: N/A;  2nd floor-gastroparesis  full liquid diet x3 days, clear liquid diet x1 day prior to procedure  fully vaccinated, instructions sent to [facility]  6/29-pt confirmed new arrival time-[facility]    EXCISION, LESION, STOMACH, LAPAROSCOPIC N/A 03/23/2023    Procedure: XI ROBOTIC EXCISION, LESION, STOMACH;  Surgeon: Katherine Segura MD;  Location: [facility] OR 2ND FLR;  Service: General;  Laterality: N/A;    EXCISIONAL BIOPSY, LYMPH NODE Right 05/26/2023    Procedure: EXCISIONAL BIOPSY, LYMPH NODE, INGUINAL;  Surgeon: Katherine Segura MD;  Location: [facility] OR  2ND FLR;  Service: General;  Laterality: Right;    HYSTERECTOMY  1990    FRANDY (AUB, Fibroids), ovaries remain    REPAIR, HERNIA, UMBILICAL N/A 03/23/2023    Procedure: REPAIR, HERNIA, UMBILICAL;  Surgeon: Katherine Segura MD;  Location: [facility] OR 2ND FLR;  Service: General;  Laterality: N/A;    RIGHT HEART CATHETERIZATION Right 01/05/2021    Procedure: INSERTION, CATHETER, RIGHT HEART;  Surgeon: Aureliano Sy MD;  Location: [facility] CATH LAB;  Service: Cardiology;  Laterality: Right;    RIGHT HEART CATHETERIZATION Right 03/16/2023    Procedure: INSERTION, CATHETER, RIGHT HEART;  Surgeon: Aureliano Sy MD;  Location: [facility] CATH LAB;  Service: Cardiology;  Laterality: Right;    RIGHT HEART CATHETERIZATION Right 11/22/2024    Procedure: INSERTION, CATHETER, RIGHT HEART;  Surgeon: Tamanna Martins MD;  Location: [facility] CATH LAB;  Service: Cardiology;  Laterality: Right;    ROBOT-ASSISTED LAPAROSCOPIC REPAIR OF INGUINAL HERNIA USING DA VERNELL XI Right 05/26/2023    Procedure: XI ROBOTIC REPAIR, HERNIA, INGUINAL, FEMORAL;  Surgeon: Katherine Segura MD;  [facility];  Service: General;  Laterality: Right;    ROBOT-ASSISTED REPAIR OF HIATAL HERNIA USING DA VERNELL XI N/A 03/23/2023    Procedure: XI ROBOTIC REPAIR, HERNIA, HIATAL;  Surgeon: Katherine Segura MD;  [facility];  Service: General;  Laterality: N/A;    VARICOSE VEIN SURGERY      XI ROBOTIC GASTROPEXY N/A 03/23/2023    Procedure: XI ROBOTIC GASTROPEXY;  Surgeon: Katherine Segura MD;  [facility];  Service: General;  Laterality: N/A;    XI ROBOTIC PYLOROMYOTOMY N/A 03/23/2023    Procedure: XI ROBOTIC PYLOROMYOTOMY;  Surgeon: Katherine Segura MD;  Location: [facility] OR 2ND FLR;  Service: General;  Laterality: N/A;        Objective:     Vitals  Wt Readings from Last 1 Encounters:   02/24/25 64.3 kg (141 lb 12.1 oz)     Temp Readings from Last 1 Encounters:     BP Readings from  Last 1 Encounters:   25 119/68     Pulse Readings from Last 1 Encounters:   25 91       Dermatological Exam    Skin:  Skin of both feet thickened (scleroderma), stiff, with extensive ulcerations of both feet down to fat with slowly improving granulation; xerosis                 Nails:  10 nail(s) thickened, and 10 nail(s) discolored    Vascular Exam    Arteries:  Posterior tibial artery palpable on right  Dorsalis pedis artery palpable on right  Posterior tibial artery palpable on left  Dorsalis pedis artery palpable on left    Veins:  Superficial veins unremarkable on right  Superficial veins unremarkable on left    Swellin+ nonpitting on right  1+ nonpitting on left    Neurological Exam    Independence touch test:  6/6 sites sensed, light touch intact     Musculoskeletal Exam    Footwear:  Casual on right  Casual on left    Gait Exam:   Ambulatory Status: Ambulatory  Gait: Normal  Assistive Devices: None    Foot Progression Angle:  Normal on right  Normal on left    Right Lower Extremity Additional Findings:  Right foot and ankle function, strength, and range of motion unremarkable except as noted above.     Left Lower Extremity Additional Findings:  Left foot and ankle function, strength, and range of motion unremarkable except as noted above.    Imaging and Other Tests    Imaging:  Independently reviewed and interpreted imaging, findings are as follows: N/A     Assessment:     Encounter Diagnoses   Name Primary?    Ulcers of both lower extremities with fat layer exposed Yes    Hammertoe of left foot          Plan:     I counseled the patient on her conditions, their implications and medical management.    Neuropathy: chronic stable  -Failed amitriptyline.   -Continue duloxetine 120 mg/day.      Bilateral ulcers, cellulitis, swelling: chronic stable   -Dressings changed. Leave intact as long as possible then continue usual dressing changes.   -Protected WB in surgical shoes.      -New culture  collected for antibiotic guidance, followup with ID as scheduled.      Scleroderma: chronic stable  -Continue PO tadalafil 40 mg/day.      I spent a total of 30 minutes on the day of the visit.  This includes face to face time and non-face to face time preparing to see the patient (eg, review of tests), obtaining and/or reviewing separately obtained history, documenting clinical information in the electronic or other health record, independently interpreting results and communicating results to the patient/family/caregiver, or care coordinator.     Return to clinic in 1-2 weeks, call sooner PRN.

## 2025-02-25 NOTE — TELEPHONE ENCOUNTER
Patient requesting refill on: Tylenol #4  Last office visit: 02.04.25   shows last refill on: 01.24.25   Patient does have a pain contract on file with [facility] department  Patient last UDS: 02.04.25 was consistent with current therapy.

## 2025-02-26 ENCOUNTER — HOSPITAL ENCOUNTER (OUTPATIENT)
Dept: RADIOLOGY | Facility: HOSPITAL | Age: 75
Discharge: HOME | End: 2025-02-26
Attending: STUDENT IN AN ORGANIZED HEALTH CARE EDUCATION/TRAINING PROGRAM
Payer: MEDICARE

## 2025-02-26 ENCOUNTER — PATIENT MESSAGE (OUTPATIENT)
Dept: PODIATRY | Facility: CLINIC | Age: 75
End: 2025-02-26
Payer: MEDICARE

## 2025-02-26 ENCOUNTER — OFFICE VISIT (OUTPATIENT)
Dept: INFECTIOUS DISEASES | Facility: CLINIC | Age: 75
End: 2025-02-26
Payer: MEDICARE

## 2025-02-26 VITALS
DIASTOLIC BLOOD PRESSURE: 57 MMHG | BODY MASS INDEX: 23.47 KG/M2 | SYSTOLIC BLOOD PRESSURE: 126 MMHG | HEIGHT: 65 IN | HEART RATE: 82 BPM | TEMPERATURE: 98 F | WEIGHT: 140.88 LBS

## 2025-02-26 DIAGNOSIS — L97.922 ULCERS OF BOTH LOWER EXTREMITIES WITH FAT LAYER EXPOSED: ICD-10-CM

## 2025-02-26 DIAGNOSIS — L97.529 SKIN ULCERS OF BOTH FEET: ICD-10-CM

## 2025-02-26 DIAGNOSIS — L97.912 ULCERS OF BOTH LOWER EXTREMITIES WITH FAT LAYER EXPOSED: ICD-10-CM

## 2025-02-26 DIAGNOSIS — I73.9 PVD (PERIPHERAL VASCULAR DISEASE): ICD-10-CM

## 2025-02-26 DIAGNOSIS — J84.9 ILD (INTERSTITIAL LUNG DISEASE): ICD-10-CM

## 2025-02-26 DIAGNOSIS — L97.519 SKIN ULCERS OF BOTH FEET: ICD-10-CM

## 2025-02-26 DIAGNOSIS — Z79.899 IMMUNOSUPPRESSION DUE TO DRUG THERAPY: ICD-10-CM

## 2025-02-26 DIAGNOSIS — M20.42 HAMMERTOE OF LEFT FOOT: ICD-10-CM

## 2025-02-26 DIAGNOSIS — M34.9 SCLERODERMA: ICD-10-CM

## 2025-02-26 DIAGNOSIS — L03.116 CELLULITIS OF LEFT LOWER EXTREMITY: Primary | ICD-10-CM

## 2025-02-26 DIAGNOSIS — I27.29 PULMONARY HYPERTENSION ASSOCIATED WITH SYSTEMIC DISORDER: ICD-10-CM

## 2025-02-26 DIAGNOSIS — I73.00 RAYNAUD'S DISEASE WITHOUT GANGRENE: ICD-10-CM

## 2025-02-26 DIAGNOSIS — D84.821 IMMUNOSUPPRESSION DUE TO DRUG THERAPY: ICD-10-CM

## 2025-02-26 DIAGNOSIS — I87.2 VENOUS INSUFFICIENCY OF BOTH LOWER EXTREMITIES: ICD-10-CM

## 2025-02-26 PROCEDURE — 73630 X-RAY EXAM OF FOOT: CPT | Mod: TC,LT,TXP

## 2025-02-26 PROCEDURE — 73630 X-RAY EXAM OF FOOT: CPT | Mod: 26,LT,TXP, | Performed by: INTERNAL MEDICINE

## 2025-02-26 PROCEDURE — 99999 PR PBB SHADOW E&M-EST. PATIENT-LVL V: CPT | Mod: PBBFAC,TXP,, | Performed by: STUDENT IN AN ORGANIZED HEALTH CARE EDUCATION/TRAINING PROGRAM

## 2025-02-26 PROCEDURE — 93970 EXTREMITY STUDY: CPT | Mod: TC,TXP

## 2025-02-26 PROCEDURE — 99215 OFFICE O/P EST HI 40 MIN: CPT | Mod: PBBFAC,TXP | Performed by: STUDENT IN AN ORGANIZED HEALTH CARE EDUCATION/TRAINING PROGRAM

## 2025-02-26 NOTE — PROGRESS NOTES
Infectious Diseases Progress Note  Subjective:     Chief Complaint: foot wounds    HPI: Mrs. Yamel Shah is a 74 y.o. female with scleroderma on mycophenolate and PVD who is here for bilateral foot wounds. She is followed in podiatry clinic and has been receiving levofloxacin for pseudomonal infection, however her healing is slow. Noted that her pseudomonas isolate is resistant to levofloxacin. She states there is a yellow exudate, wounds are extremely painful but no foul odor. She performs home wound care on her own with soap and water. She does not soak her feet. She denies fevers.       Immunization History   Administered Date(s) Administered    COVID-19 MRNA, LN-S PF (MODERNA HALF 0.25 ML DOSE) 04/21/2022    COVID-19, MRNA, LN-S, PF (MODERNA FULL 0.5 ML DOSE) 02/09/2021, 03/12/2021, 09/21/2021    COVID-19, MRNA, LN-S, PF (Pfizer) (Purple Cap) 10/13/2022    COVID-19, mRNA, LNP-S, PF (Moderna) Ages 12+ 10/18/2023, 05/02/2024    COVID-19, mRNA, LNP-S, PF, rosi-sucrose, 30 mcg/0.3 mL (Pfizer Ages 12+) 11/07/2024    COVID-19, mRNA, LNP-S, bivalent booster, PF (Moderna Omicron)12 + YEARS 10/13/2022, 10/13/2022    Influenza 11/02/2015, 09/21/2021, 09/22/2023    Influenza (FLUAD) - Quadrivalent - Adjuvanted - PF *Preferred* (65+) 09/11/2020, 09/12/2022, 09/22/2023    Influenza - Quadrivalent - PF *Preferred* (6 months and older) 11/03/2006, 10/08/2007, 09/29/2009, 09/26/2014, 09/26/2014, 11/02/2015, 11/02/2015, 09/27/2016    Influenza - Trivalent - Afluria, Fluzone MDV 11/03/2006, 10/08/2007, 09/29/2009, 09/26/2014    Influenza - Trivalent - Fluad - Adjuvanted - PF (65 years and older 09/13/2024    Influenza - Trivalent - Fluarix, Flulaval, Fluzone, Afluria - PF 11/02/2015    Influenza - Trivalent - Fluzone High Dose - PF (65 years and older) 10/09/2017, 10/11/2017, 10/27/2018, 09/09/2019    Influenza Split 11/03/2006, 10/08/2007, 09/29/2009, 09/26/2014    Pneumococcal Conjugate - 13 Valent 01/16/2013, 01/16/2013     Pneumococcal Polysaccharide - 23 Valent 09/27/2016, 11/12/2021    RSVpreF (Arexvy) 10/27/2023    Tdap 02/06/2019    Zoster 01/16/2013    Zoster Recombinant 11/30/2018, 02/12/2019        Review of Systems   Constitutional: Positive for malaise/fatigue.   HENT:  Positive for tinnitus.    Cardiovascular:  Positive for leg swelling.   Respiratory:  Positive for wheezing.    Hematologic/Lymphatic: Bruises/bleeds easily.   Skin:  Positive for dry skin.   Musculoskeletal:  Positive for neck pain.        Past Medical History:   Diagnosis Date    Abnormal Pap smear     Acid reflux     Allergy     Arthritis     Encounter for blood transfusion     GERD (gastroesophageal reflux disease)     Hiatal hernia 03/24/2023    History of migraine headaches     Hx of colonic polyp     Hypertension     Idiopathic neuropathy 07/20/2012    ILD (interstitial lung disease) 11/06/2013    Iron deficiency anemia 03/18/2014    MRSA carrier     Osteopenia     Pneumonia     Pulmonary fibrosis     Pulmonary hypertension     Raynaud's disease     Scleroderma, diffuse     Sjogren's syndrome     Vitamin D deficiency 11/14/2013     Past Surgical History:   Procedure Laterality Date    24 HOUR IMPEDANCE PH MONITORING OF ESOPHAGUS IN PATIENT NOT TAKING ACID REDUCING MEDICATIONS N/A 03/04/2020    Procedure: IMPEDANCE PH STUDY, ESOPHAGEAL, 24 HOUR, IN PATIENT NOT TAKING ACID REDUCING MEDICATION;  Surgeon: Annamaria Mendoza MD;  [facility]  Service: Endoscopy;  Laterality: N/A;  OFF PPI/H2 Blocker   Motility Studies   Hold Narcotics x 1 days   Hold TCA x 1 days  2/26 - LVM attempting to confirm appt  2/27 - Confirmed appt    ANORECTAL MANOMETRY N/A 05/10/2021    Procedure: MANOMETRY, ANORECTAL with balloon expulsion test;  Surgeon: Annamaria Mendoza MD;  [facility]  Service: Endoscopy;  Laterality: N/A;  order combined  covid test 5/7 [employer], instructions emailed-[facility]    BREAST BIOPSY      Left, benign     BRONCHOSCOPY N/A 10/2/2023    Procedure: bronch;  Surgeon: Roberta Leigh DO;  Location: [facility] OR 2ND FLR;  Service: Endoscopy;  Laterality: N/A;    BRONCHOSCOPY N/A 9/16/2024    Procedure: Flexible bronchoscopy (BAL only);  Surgeon: Roberta Leigh DO;  Location: [facility] OR 2ND FLR;  Service: Endoscopy;  Laterality: N/A;    CERVICAL CONIZATION   W/ LASER  1970    COLONOSCOPY      COLONOSCOPY N/A 03/29/2019    Procedure: COLONOSCOPY;  Surgeon: Annamaria Mendoza MD;  Location: [facility] ENDO (2ND FLR);  Service: Endoscopy;  Laterality: N/A;    COLONOSCOPY N/A 05/10/2021    Procedure: COLONOSCOPY;  Surgeon: Annamaria Mendoza MD;  Location: [facility] ENDO (4TH FLR);  Service: Endoscopy;  Laterality: N/A;  2nd floor-previous scopes done on 2nd floor, gastroparesis  full liquid diet x2 days, clear liquid x1 day prior to procedure  covid test 5/7 [facility], instructions emailed-[facility]  5/6 pt confirmed appt-[facility]    DILATION AND CURETTAGE OF UTERUS      ESOPHAGEAL MANOMETRY WITH MEASUREMENT OF IMPEDANCE N/A 03/04/2020    Procedure: MANOMETRY, ESOPHAGUS, WITH IMPEDANCE MEASUREMENT;  Surgeon: Annamaria Mendoza MD;  Location: [facility] (4TH FLR);  Service: Endoscopy;  Laterality: N/A;  OFF PPI/H2 Blocker   Motility Studies   Hold Narcotics x 1 days   Hold TCA x 1 days    ESOPHAGOGASTRODUODENOSCOPY      ESOPHAGOGASTRODUODENOSCOPY N/A 03/29/2019    Procedure: EGD (ESOPHAGOGASTRODUODENOSCOPY);  Surgeon: Annamaria Mendoza MD;  Location: [facility] ENDO (2ND FLR);  Service: Endoscopy;  Laterality: N/A;  pulmonary htn    ESOPHAGOGASTRODUODENOSCOPY N/A 02/02/2021    Procedure: ESOPHAGOGASTRODUODENOSCOPY (EGD);  Surgeon: Annamaria Mendoza MD;  Location: [facility] ENDO (2ND FLR);  Service: Endoscopy;  Laterality: N/A;  2nd floor gastroparesis  3 days full liquid diet and 1 day clears  covid test 1/30 primary care, instructions sent to [facility]    ESOPHAGOGASTRODUODENOSCOPY N/A 07/01/2022    Procedure: ESOPHAGOGASTRODUODENOSCOPY (EGD);  Surgeon:  Annamaria Mendoza MD;  Location: [facility] ENDO (2ND FLR);  Service: Endoscopy;  Laterality: N/A;  2nd floor-gastroparesis  full liquid diet x3 days, clear liquid diet x1 day prior to procedure  fully vaccinated, instructions sent to [facility]  6/29-pt confirmed new arrival time-[facility]    EXCISION, LESION, STOMACH, LAPAROSCOPIC N/A 03/23/2023    Procedure: XI ROBOTIC EXCISION, LESION, STOMACH;  Surgeon: Katherine Segura MD;  [address]  Service: General;  Laterality: N/A;    EXCISIONAL BIOPSY, LYMPH NODE Right 05/26/2023    Procedure: EXCISIONAL BIOPSY, LYMPH NODE, INGUINAL;  Surgeon: Katherine Segura MD;  Location: [address];  Service: General;  Laterality: Right;    HYSTERECTOMY  1990    FRANDY (AUB, Fibroids), ovaries remain    REPAIR, HERNIA, UMBILICAL N/A 03/23/2023    Procedure: REPAIR, HERNIA, UMBILICAL;  Surgeon: Katherine Segura MD;  Location: [address];  Service: General;  Laterality: N/A;    RIGHT HEART CATHETERIZATION Right 01/05/2021    Procedure: INSERTION, CATHETER, RIGHT HEART;  Surgeon: Aureliano Sy MD;  Location: [facility] CATH LAB;  Service: Cardiology;  Laterality: Right;    RIGHT HEART CATHETERIZATION Right 03/16/2023    Procedure: INSERTION, CATHETER, RIGHT HEART;  Surgeon: Aureliano Sy MD;  Location: [facility] CATH LAB;  Service: Cardiology;  Laterality: Right;    RIGHT HEART CATHETERIZATION Right 11/22/2024    Procedure: INSERTION, CATHETER, RIGHT HEART;  Surgeon: Tamanna Martins MD;  Location: [facility] CATH LAB;  Service: Cardiology;  Laterality: Right;    ROBOT-ASSISTED LAPAROSCOPIC REPAIR OF INGUINAL HERNIA USING DA VERNELL XI Right 05/26/2023    Procedure: XI ROBOTIC REPAIR, HERNIA, INGUINAL, FEMORAL;  Surgeon: Katherine Segura MD;  Location: [address];  Service: General;  Laterality: Right;    ROBOT-ASSISTED REPAIR OF HIATAL HERNIA USING DA VERNELL XI N/A 03/23/2023    Procedure: XI ROBOTIC REPAIR, HERNIA, HIATAL;   Surgeon: Katherine Segura MD;  Location: [facility] OR 2ND FLR;  Service: General;  Laterality: N/A;    VARICOSE VEIN SURGERY      XI ROBOTIC GASTROPEXY N/A 03/23/2023    Procedure: XI ROBOTIC GASTROPEXY;  Surgeon: Katherine Segura MD;  Location: [facility] OR 2ND FLR;  Service: General;  Laterality: N/A;    XI ROBOTIC PYLOROMYOTOMY N/A 03/23/2023    Procedure: XI ROBOTIC PYLOROMYOTOMY;  Surgeon: Katherine Segura MD;  Location: [facility] OR 2ND FLR;  Service: General;  Laterality: N/A;     Family History   Problem Relation Name Age of Onset    Breast cancer Mother Tiki Singh     Cancer Mother Tiki Singh         Breast    Hypertension Father Madi     Diabetes Father Madi         Amputation of legs    Breast cancer Sister Brenda     Diabetes Sister Brenda     Arthritis Sister Brenda     Cancer Sister Brenda         Breast    Osteoarthritis Brother Luis     Diabetes Brother Luis     Arthritis Brother Luis     Birth defects Brother Luis         Polio at birth, recently had knee replacement surgery on left knee    No Known Problems Daughter      No Known Problems Daughter      No Known Problems Son      No Known Problems Son      Breast cancer Maternal Aunt Geeg     Cancer Maternal Aunt Gege         Breast    Early death Sister Philomena     Melanoma Neg Hx      Colon cancer Neg Hx      Crohn's disease Neg Hx      Stomach cancer Neg Hx      Ulcerative colitis Neg Hx      Rectal cancer Neg Hx      Irritable bowel syndrome Neg Hx      Esophageal cancer Neg Hx      Celiac disease Neg Hx      Ovarian cancer Neg Hx      Liver cancer Neg Hx      Pancreatic cancer Neg Hx       Social History[1]    Objective:     Physical Exam  Vitals reviewed.   Constitutional:       Appearance: She is not ill-appearing.   HENT:      Head: Normocephalic.   Pulmonary:      Effort: Pulmonary effort is normal. No respiratory distress.   Musculoskeletal:      Comments: Bilateral feet wrapped, wound care photos reviewed     Skin:     Findings: No rash.   Neurological:      General: No focal deficit present.      Mental Status: She is alert and oriented to person, place, and time.         Data:    All data, including recent labs, radiology, and pathology, has been independently reviewed.    Labs:    Recent Labs   Lab Result Units 02/17/25  1005   WBC K/uL 9.52   Hemoglobin g/dL 11.5*   Hematocrit % 36.0*   Sodium mmol/L 141   Potassium mmol/L 3.7   Chloride mmol/L 107   BUN mg/dL 21   Creatinine mg/dL 0.7   AST U/L 29   ALT U/L 17   Alkaline Phosphatase U/L 94   Total Bilirubin mg/dL 0.3        Radiology:    No results found in the last 24 hours.     Assessment:  Bilateral foot wounds: wound cultures with FQ resistant pseudomonas aeruginosa. Slow wound healing. Intermediate to cefepime and LAURYN of 16 for pip-tazo. Will use meropenem, plan on at least 7 days, but up to 14 days of treatment. Will need PICC line.    1) Antibiotics: Meropenem    Intravenous antibiotics:  1 gram IV q 8 hours     2) Therapy Duration:  14 days    Estimated end date of IV antibiotics: 3/11/2025    3) Outpatient Weekly Labs:    Order the following labs to be drawn on Mondays:   CBC  CMP   CRP    4) Fax Lab Results to Infectious Diseases Provider: Anthony    [facility] Fax Number: [phone]    Follow up in 2 weeks    I spent a total of 22 minutes on the day of the visit.  This includes face to face time and non-face to face time preparing to see the patient (eg, review of tests), obtaining and/or reviewing separately obtained history, documenting clinical information in the electronic or other health record, independently interpreting results and communicating results to the patient/family/caregiver, or care coordinator.    Jaqui Cruz MD  Infectious Disease           [1]   Social History  Tobacco Use    Smoking status: Never     Passive exposure: Never    Smokeless tobacco: Never   Substance Use Topics    Alcohol use: Not Currently    Drug use:  No

## 2025-02-27 ENCOUNTER — RESULTS FOLLOW-UP (OUTPATIENT)
Dept: PODIATRY | Facility: CLINIC | Age: 75
End: 2025-02-27

## 2025-02-27 RX ORDER — HEPARIN 100 UNIT/ML
500 SYRINGE INTRAVENOUS
OUTPATIENT
Start: 2025-03-03

## 2025-02-27 RX ORDER — SODIUM CHLORIDE 0.9 % (FLUSH) 0.9 %
10 SYRINGE (ML) INJECTION
OUTPATIENT
Start: 2025-03-03

## 2025-02-28 ENCOUNTER — TELEPHONE (OUTPATIENT)
Dept: INTERVENTIONAL RADIOLOGY/VASCULAR | Facility: CLINIC | Age: 75
End: 2025-02-28
Payer: MEDICARE

## 2025-03-06 ENCOUNTER — PATIENT MESSAGE (OUTPATIENT)
Dept: PODIATRY | Facility: CLINIC | Age: 75
End: 2025-03-06
Payer: MEDICARE

## 2025-03-06 ENCOUNTER — HOSPITAL ENCOUNTER (OUTPATIENT)
Dept: RADIOLOGY | Facility: HOSPITAL | Age: 75
Discharge: HOME | End: 2025-03-06
Attending: STUDENT IN AN ORGANIZED HEALTH CARE EDUCATION/TRAINING PROGRAM
Payer: MEDICARE

## 2025-03-06 ENCOUNTER — HOSPITAL ENCOUNTER (OUTPATIENT)
Dept: CARDIOLOGY | Facility: HOSPITAL | Age: 75
Discharge: HOME | End: 2025-03-06
Attending: STUDENT IN AN ORGANIZED HEALTH CARE EDUCATION/TRAINING PROGRAM
Payer: MEDICARE

## 2025-03-06 ENCOUNTER — PATIENT MESSAGE (OUTPATIENT)
Dept: INTERVENTIONAL RADIOLOGY/VASCULAR | Facility: HOSPITAL | Age: 75
End: 2025-03-06
Payer: MEDICARE

## 2025-03-06 ENCOUNTER — TELEPHONE (OUTPATIENT)
Dept: INTERVENTIONAL RADIOLOGY/VASCULAR | Facility: CLINIC | Age: 75
End: 2025-03-06
Payer: MEDICARE

## 2025-03-06 DIAGNOSIS — J84.9 ILD (INTERSTITIAL LUNG DISEASE): ICD-10-CM

## 2025-03-06 DIAGNOSIS — Z79.899 IMMUNOSUPPRESSION DUE TO DRUG THERAPY: ICD-10-CM

## 2025-03-06 DIAGNOSIS — I73.00 RAYNAUD'S DISEASE WITHOUT GANGRENE: ICD-10-CM

## 2025-03-06 DIAGNOSIS — I87.2 VENOUS INSUFFICIENCY OF BOTH LOWER EXTREMITIES: ICD-10-CM

## 2025-03-06 DIAGNOSIS — I27.29 PULMONARY HYPERTENSION ASSOCIATED WITH SYSTEMIC DISORDER: ICD-10-CM

## 2025-03-06 DIAGNOSIS — L97.529 SKIN ULCERS OF BOTH FEET: ICD-10-CM

## 2025-03-06 DIAGNOSIS — M34.9 SCLERODERMA: ICD-10-CM

## 2025-03-06 DIAGNOSIS — D84.821 IMMUNOSUPPRESSION DUE TO DRUG THERAPY: ICD-10-CM

## 2025-03-06 DIAGNOSIS — L97.519 SKIN ULCERS OF BOTH FEET: ICD-10-CM

## 2025-03-06 DIAGNOSIS — I73.9 PVD (PERIPHERAL VASCULAR DISEASE): ICD-10-CM

## 2025-03-06 LAB
LEFT ABI: 1.09
LEFT ARM BP: 122 MMHG
LEFT DORSALIS PEDIS: 130 MMHG
LEFT POSTERIOR TIBIAL: 133 MMHG
RIGHT ABI: 1.17
RIGHT ARM BP: 122 MMHG
RIGHT DORSALIS PEDIS: 143 MMHG
RIGHT POSTERIOR TIBIAL: 142 MMHG

## 2025-03-06 PROCEDURE — 93925 LOWER EXTREMITY STUDY: CPT | Mod: TC,TXP

## 2025-03-06 PROCEDURE — 93922 UPR/L XTREMITY ART 2 LEVELS: CPT | Mod: TXP

## 2025-03-07 ENCOUNTER — RESULTS FOLLOW-UP (OUTPATIENT)
Dept: RHEUMATOLOGY | Facility: CLINIC | Age: 75
End: 2025-03-07

## 2025-03-07 ENCOUNTER — OFFICE VISIT (OUTPATIENT)
Dept: PODIATRY | Facility: CLINIC | Age: 75
End: 2025-03-07
Payer: MEDICARE

## 2025-03-07 VITALS
SYSTOLIC BLOOD PRESSURE: 130 MMHG | HEART RATE: 74 BPM | BODY MASS INDEX: 23.44 KG/M2 | HEIGHT: 65 IN | DIASTOLIC BLOOD PRESSURE: 59 MMHG

## 2025-03-07 DIAGNOSIS — L97.912 ULCERS OF BOTH LOWER EXTREMITIES WITH FAT LAYER EXPOSED: Primary | ICD-10-CM

## 2025-03-07 DIAGNOSIS — L03.116 CELLULITIS OF LEFT LOWER EXTREMITY: ICD-10-CM

## 2025-03-07 DIAGNOSIS — L97.922 ULCERS OF BOTH LOWER EXTREMITIES WITH FAT LAYER EXPOSED: Primary | ICD-10-CM

## 2025-03-07 PROCEDURE — 99214 OFFICE O/P EST MOD 30 MIN: CPT | Mod: PBBFAC | Performed by: STUDENT IN AN ORGANIZED HEALTH CARE EDUCATION/TRAINING PROGRAM

## 2025-03-07 PROCEDURE — 99999 PR PBB SHADOW E&M-EST. PATIENT-LVL IV: CPT | Mod: PBBFAC,,, | Performed by: STUDENT IN AN ORGANIZED HEALTH CARE EDUCATION/TRAINING PROGRAM

## 2025-03-07 RX ORDER — NIFEDIPINE 90 MG/1
90 TABLET, EXTENDED RELEASE ORAL
COMMUNITY
Start: 2025-02-17

## 2025-03-07 NOTE — PROGRESS NOTES
Subjective:     Patient    Yamel Shah is a 74 y.o. female.    Problem    2024: Previously seen by multiple wound care providers without significant improvement in wounds, has bounced around a lot. Has bilateral foot wounds related to scleroderma and immunosuppressant medications. Had improvement in granulation with regular wound care, oral and topical antibiotics, one debridement. Attempted topical vitamin E but caused burning.     01/03/25: Returns for bilateral foot wound care. No new concerns. Had pain flare on left foot, still sensitive.     01/08/25: Returns for bilateral foot wound care. No new concerns.     01/16/25: Returns for bilateral foot wound care. Awaiting appointment with ID 02/14. No new concerns.     01/24/25: Returns for bilateral foot wound care. Pain controlled on current medications. No new concerns.     01/31/25: Returns for bilateral foot wound care. Not tolerating amitriptyline well; still with nerve pain, tingling, cramping, burning despite duloxetine, tolerates duloxetine well.     02/07/25: Returns for bilateral foot wound care. Increased duloxetine at last visit for BLE nerve pain with significant improvement in pain without side effects. ID appointment rescheduled to 02/24.     02/14/25: Returns for bilateral foot wound care. Wants to restart PO antibiotics until she sees ID, thinks she was progressing better at that time.     02/21/25: Returns for bilateral foot wound care. Wounds seem somewhat better on PO antibiotics, ID appointment next week.     02/25/25: Returns for bilateral foot wound care. Completed recent PO antibiotics, recent cultures + for Pseudomonas resistant to PO antibiotics; has first ID appointment tomorrow.     03/07/25: Returns for bilateral foot wound care. Getting OPAT per ID soon. Pain OK.     History    History obtained from patient and review of medical records.     Past Medical History:   Diagnosis Date    Abnormal Pap smear     Acid  reflux     Allergy     Arthritis     Encounter for blood transfusion     GERD (gastroesophageal reflux disease)     Hiatal hernia 03/24/2023    History of migraine headaches     Hx of colonic polyp     Hypertension     Idiopathic neuropathy 07/20/2012    ILD (interstitial lung disease) 11/06/2013    Iron deficiency anemia 03/18/2014    MRSA carrier     Osteopenia     Pneumonia     Pulmonary fibrosis     Pulmonary hypertension     Raynaud's disease     Scleroderma, diffuse     Sjogren's syndrome     Vitamin D deficiency 11/14/2013       Past Surgical History:   Procedure Laterality Date    24 HOUR IMPEDANCE PH MONITORING OF ESOPHAGUS IN PATIENT NOT TAKING ACID REDUCING MEDICATIONS N/A 03/04/2020    Procedure: IMPEDANCE PH STUDY, ESOPHAGEAL, 24 HOUR, IN PATIENT NOT TAKING ACID REDUCING MEDICATION;  Surgeon: Annamaria Mendoza MD;  [facility]  Service: Endoscopy;  Laterality: N/A;  OFF PPI/H2 Blocker   Motility Studies   Hold Narcotics x 1 days   Hold TCA x 1 days  2/26 - LVM attempting to confirm appt  2/27 - Confirmed appt    ANORECTAL MANOMETRY N/A 05/10/2021    Procedure: MANOMETRY, ANORECTAL with balloon expulsion test;  Surgeon: Annamaria Mendoza MD;  Location: [facility] (4TH FLR);  Service: Endoscopy;  Laterality: N/A;  order combined  covid test 5/7 [place], instructions emailed-[facility]    BREAST BIOPSY      Left, benign    BRONCHOSCOPY N/A 10/2/2023    Procedure: bronch;  Surgeon: Roberta Leigh DO;  Location: [facility] OR 2ND FLR;  Service: Endoscopy;  Laterality: N/A;    BRONCHOSCOPY N/A 9/16/2024    Procedure: Flexible bronchoscopy (BAL only);  Surgeon: Roberta Leigh DO;  [facility]  Service: Endoscopy;  Laterality: N/A;    CERVICAL CONIZATION   W/ LASER  1970    COLONOSCOPY      COLONOSCOPY N/A 03/29/2019    Procedure: COLONOSCOPY;  Surgeon: Annamaria Mendoza MD;  Location: [facility] ENDO (2ND FLR);  Service: Endoscopy;  Laterality: N/A;    COLONOSCOPY N/A 05/10/2021     Procedure: COLONOSCOPY;  Surgeon: Annamaria Mendoza MD;  Location: [facility] (4TH FLR);  Service: Endoscopy;  Laterality: N/A;  2nd floor-previous scopes done on 2nd floor, gastroparesis  full liquid diet x2 days, clear liquid x1 day prior to procedure  covid test 5/7 [employer], instructions emailed-[facility]  5/6 pt confirmed appt-[facility]    DILATION AND CURETTAGE OF UTERUS      ESOPHAGEAL MANOMETRY WITH MEASUREMENT OF IMPEDANCE N/A 03/04/2020    Procedure: MANOMETRY, ESOPHAGUS, WITH IMPEDANCE MEASUREMENT;  Surgeon: Annamaria Mendoza MD;  Location: [facility] (4TH FLR);  Service: Endoscopy;  Laterality: N/A;  OFF PPI/H2 Blocker   Motility Studies   Hold Narcotics x 1 days   Hold TCA x 1 days    ESOPHAGOGASTRODUODENOSCOPY      ESOPHAGOGASTRODUODENOSCOPY N/A 03/29/2019    Procedure: EGD (ESOPHAGOGASTRODUODENOSCOPY);  Surgeon: Annamaria Mendoza MD;  Location: [facility] (2ND FLR);  Service: Endoscopy;  Laterality: N/A;  pulmonary htn    ESOPHAGOGASTRODUODENOSCOPY N/A 02/02/2021    Procedure: ESOPHAGOGASTRODUODENOSCOPY (EGD);  Surgeon: Annamaria Mendoza MD;  Location: [facility] (2ND FLR);  Service: Endoscopy;  Laterality: N/A;  2nd floor gastroparesis  3 days full liquid diet and 1 day clears  covid test 1/30 primary care, instructions sent to [facility]    ESOPHAGOGASTRODUODENOSCOPY N/A 07/01/2022    Procedure: ESOPHAGOGASTRODUODENOSCOPY (EGD);  Surgeon: Annamaria Mendoza MD;  Location: [facility] (2ND FLR);  Service: Endoscopy;  Laterality: N/A;  2nd floor-gastroparesis  full liquid diet x3 days, clear liquid diet x1 day prior to procedure  fully vaccinated, instructions sent to [facility]  6/29-pt confirmed new arrival time-\[facility]\""    EXCISION, LESION, STOMACH, LAPAROSCOPIC N/A 03/23/2023    Procedure: XI ROBOTIC EXCISION, LESION, STOMACH;  Surgeon: Katherine Segura MD;  Location: [facility] OR 2ND FLR;  Service: General;  Laterality: N/A;    EXCISIONAL BIOPSY, LYMPH NODE Right 05/26/2023    Procedure: EXCISIONAL BIOPSY, LYMPH  NODE, INGUINAL;  Surgeon: Katherine Segura MD;  Location: [facility] OR 2ND FLR;  Service: General;  Laterality: Right;    HYSTERECTOMY  1990    FRANDY (AUB, Fibroids), ovaries remain    REPAIR, HERNIA, UMBILICAL N/A 03/23/2023    Procedure: REPAIR, HERNIA, UMBILICAL;  Surgeon: Katherine Segura MD;  Location: [facility] OR 2ND FLR;  Service: General;  Laterality: N/A;    RIGHT HEART CATHETERIZATION Right 01/05/2021    Procedure: INSERTION, CATHETER, RIGHT HEART;  Surgeon: Aureliano Sy MD;  Location: [facility] CATH LAB;  Service: Cardiology;  Laterality: Right;    RIGHT HEART CATHETERIZATION Right 03/16/2023    Procedure: INSERTION, CATHETER, RIGHT HEART;  Surgeon: Aureliano Sy MD;  Location: [facility] CATH LAB;  Service: Cardiology;  Laterality: Right;    RIGHT HEART CATHETERIZATION Right 11/22/2024    Procedure: INSERTION, CATHETER, RIGHT HEART;  Surgeon: Tamanna Martins MD;  Location: [facility] CATH LAB;  Service: Cardiology;  Laterality: Right;    ROBOT-ASSISTED LAPAROSCOPIC REPAIR OF INGUINAL HERNIA USING DA VERNELL XI Right 05/26/2023    Procedure: XI ROBOTIC REPAIR, HERNIA, INGUINAL, FEMORAL;  Surgeon: Katherine Segura MD;  [facility]  Service: General;  Laterality: Right;    ROBOT-ASSISTED REPAIR OF HIATAL HERNIA USING DA VERNELL XI N/A 03/23/2023    Procedure: XI ROBOTIC REPAIR, HERNIA, HIATAL;  Surgeon: Katherine Segura MD;  Location: [facility] OR [facility] FLR;  Service: General;  Laterality: N/A;    VARICOSE VEIN SURGERY      XI ROBOTIC GASTROPEXY N/A 03/23/2023    Procedure: XI ROBOTIC GASTROPEXY;  Surgeon: Katherine Segura MD;  Location: [facility] OR 2ND FLR;  Service: General;  Laterality: N/A;    XI ROBOTIC PYLOROMYOTOMY N/A 03/23/2023    Procedure: XI ROBOTIC PYLOROMYOTOMY;  Surgeon: Katherine Segura MD;  Location: [facility] OR 2ND FLR;  Service: General;  Laterality: N/A;        Objective:     Vitals  Wt Readings from Last 1 Encounters:   02/26/25 63.9 kg (140 lb  14 oz)     Temp Readings from Last 1 Encounters:   25 97.9 °F (36.6 °C) (Oral)     BP Readings from Last 1 Encounters:   25 (!) 130/59     Pulse Readings from Last 1 Encounters:   25 74       Dermatological Exam    Skin:  Skin of both feet thickened (scleroderma), stiff, with extensive ulcerations of both feet down to fat with slowly improving granulation; xerosis       Nails:  10 nail(s) thickened, and 10 nail(s) discolored    Vascular Exam    Arteries:  Posterior tibial artery palpable on right  Dorsalis pedis artery palpable on right  Posterior tibial artery palpable on left  Dorsalis pedis artery palpable on left    Veins:  Superficial veins unremarkable on right  Superficial veins unremarkable on left    Swellin+ nonpitting on right  1+ nonpitting on left    Neurological Exam    [place] touch test:  6/6 sites sensed, light touch intact     Musculoskeletal Exam    Footwear:  Casual on right  Casual on left    Gait Exam:   Ambulatory Status: Ambulatory  Gait: Normal  Assistive Devices: None    Foot Progression Angle:  Normal on right  Normal on left    Right Lower Extremity Additional Findings:  Right foot and ankle function, strength, and range of motion unremarkable except as noted above.     Left Lower Extremity Additional Findings:  Left foot and ankle function, strength, and range of motion unremarkable except as noted above.    Imaging and Other Tests    Imaging:  Independently reviewed and interpreted imaging, findings are as follows: N/A     Assessment:     Encounter Diagnoses   Name Primary?    Ulcers of both lower extremities with fat layer exposed Yes    Cellulitis of left lower extremity            Plan:     I counseled the patient on her conditions, their implications and medical management.    Neuropathy: chronic stable  -Failed amitriptyline.   -Continue duloxetine 120 mg/day.      Bilateral ulcers, cellulitis, swelling: chronic stable   -Dressings changed. Leave intact as  long as possible then continue usual dressing changes.   -Protected WB in surgical shoes.      -New culture collected for antibiotic guidance, followup with ID as scheduled.      Scleroderma: chronic stable  -Continue PO tadalafil 40 mg/day.      I spent a total of 30 minutes on the day of the visit.  This includes face to face time and non-face to face time preparing to see the patient (eg, review of tests), obtaining and/or reviewing separately obtained history, documenting clinical information in the electronic or other health record, independently interpreting results and communicating results to the patient/family/caregiver, or care coordinator.     Return to clinic in 1-2 weeks, call sooner PRN.

## 2025-03-10 ENCOUNTER — TELEPHONE (OUTPATIENT)
Dept: INTERVENTIONAL RADIOLOGY/VASCULAR | Facility: HOSPITAL | Age: 75
End: 2025-03-10
Payer: MEDICARE

## 2025-03-10 NOTE — NURSING
Pre-procedure call complete.  2 patient identifier used (name and ).   Arrival time 0730.  Patient instructed not to eat or drink anything after midnight the night before procedure.  Patient aware will need someone to provide transport home and monitor pt 8 hours post procedure.  No driving for at least 24 hours after procedure.   Patient advised to take blood pressure, heart medications,  and anti-rejections medications with a sip of water morning of procedure.  Patient verbalized aware of which medications to take.  Do not take  sleep medication (including OTC) and anxiety medication the night before procedure.  Arrival time and location given.  Expected length of stay reviewed.  Covid screening completed.  Patient verbalized understanding of all pre-procedure instructions.  Written instructions and directions sent to patient in [facility]/portal.

## 2025-03-12 ENCOUNTER — OFFICE VISIT (OUTPATIENT)
Dept: PODIATRY | Facility: CLINIC | Age: 75
End: 2025-03-12
Payer: MEDICARE

## 2025-03-12 VITALS
DIASTOLIC BLOOD PRESSURE: 75 MMHG | BODY MASS INDEX: 23.44 KG/M2 | SYSTOLIC BLOOD PRESSURE: 127 MMHG | HEART RATE: 79 BPM | HEIGHT: 65 IN

## 2025-03-12 DIAGNOSIS — L97.922 ULCERS OF BOTH LOWER EXTREMITIES WITH FAT LAYER EXPOSED: Primary | ICD-10-CM

## 2025-03-12 DIAGNOSIS — L97.912 ULCERS OF BOTH LOWER EXTREMITIES WITH FAT LAYER EXPOSED: Primary | ICD-10-CM

## 2025-03-12 PROCEDURE — 11042 DBRDMT SUBQ TIS 1ST 20SQCM/<: CPT | Mod: PBBFAC | Performed by: STUDENT IN AN ORGANIZED HEALTH CARE EDUCATION/TRAINING PROGRAM

## 2025-03-12 PROCEDURE — 99214 OFFICE O/P EST MOD 30 MIN: CPT | Mod: PBBFAC | Performed by: STUDENT IN AN ORGANIZED HEALTH CARE EDUCATION/TRAINING PROGRAM

## 2025-03-12 PROCEDURE — 99999 PR PBB SHADOW E&M-EST. PATIENT-LVL IV: CPT | Mod: PBBFAC,,, | Performed by: STUDENT IN AN ORGANIZED HEALTH CARE EDUCATION/TRAINING PROGRAM

## 2025-03-12 PROCEDURE — 99215 OFFICE O/P EST HI 40 MIN: CPT | Mod: 25,S$PBB,, | Performed by: STUDENT IN AN ORGANIZED HEALTH CARE EDUCATION/TRAINING PROGRAM

## 2025-03-12 NOTE — PROCEDURES
"Wound Debridement    Date/Time: 3/12/2025 11:00 AM    Performed by: Yannick Nava DPM  Authorized by: Yannick Nava DPM    Time out: Immediately prior to procedure a "time out" was called to verify the correct patient, procedure, equipment, support staff and site/side marked as required.    Consent Done?:  Yes (Verbal)    Wound Details:    Location:  Left foot    Location:  Left 2nd Toe    Type of Debridement:  Excisional       Length (cm):  1       Width (cm):  1       Depth (cm):  0.3       Area (sq cm):  0.79       Percent Debrided (%):  100       Total Area Debrided (sq cm):  0.79    Depth of debridement:  Subcutaneous tissue    Tissue debrided:  Adipose, Subcutaneous and Hypergranulation    Devitalized tissue debrided:  Exudate, Fibrin and Slough    Instruments:  Curette, Blade and Forceps  Bleeding:  Moderate  Hemostasis Achieved: Yes  Method Used:  Pressure and Silver Nitrate  Patient tolerance:  Patient tolerated the procedure well with no immediate complications    "

## 2025-03-12 NOTE — PROGRESS NOTES
Subjective:     Patient    Yamel Shah is a 74 y.o. female.    Problem    2024: Previously seen by multiple wound care providers without significant improvement in wounds, has bounced around a lot. Has bilateral foot wounds related to scleroderma and immunosuppressant medications. Had improvement in granulation with regular wound care, oral and topical antibiotics, one debridement. Attempted topical vitamin E but caused burning.     01/03/25: Returns for bilateral foot wound care. No new concerns. Had pain flare on left foot, still sensitive.     01/08/25: Returns for bilateral foot wound care. No new concerns.     01/16/25: Returns for bilateral foot wound care. Awaiting appointment with ID 02/14. No new concerns.     01/24/25: Returns for bilateral foot wound care. Pain controlled on current medications. No new concerns.     01/31/25: Returns for bilateral foot wound care. Not tolerating amitriptyline well; still with nerve pain, tingling, cramping, burning despite duloxetine, tolerates duloxetine well.     02/07/25: Returns for bilateral foot wound care. Increased duloxetine at last visit for BLE nerve pain with significant improvement in pain without side effects. ID appointment rescheduled to 02/24.     02/14/25: Returns for bilateral foot wound care. Wants to restart PO antibiotics until she sees ID, thinks she was progressing better at that time.     02/21/25: Returns for bilateral foot wound care. Wounds seem somewhat better on PO antibiotics, ID appointment next week.     02/25/25: Returns for bilateral foot wound care. Completed recent PO antibiotics, recent cultures + for Pseudomonas resistant to PO antibiotics; has first ID appointment tomorrow.     03/07/25: Returns for bilateral foot wound care. Getting OPAT per ID soon. Pain OK.     03/12/25: Returns for bilateral foot wound care. Will start antibiotics within next week. Pain OK except severe at left 2nd toe dorsally. Wants  debridement of left 2nd toe today.     History    History obtained from patient and review of medical records.     Past Medical History:   Diagnosis Date    Abnormal Pap smear     Acid reflux     Allergy     Arthritis     Encounter for blood transfusion     GERD (gastroesophageal reflux disease)     Hiatal hernia 03/24/2023    History of migraine headaches     Hx of colonic polyp     Hypertension     Idiopathic neuropathy 07/20/2012    ILD (interstitial lung disease) 11/06/2013    Iron deficiency anemia 03/18/2014    MRSA carrier     Osteopenia     Pneumonia     Pulmonary fibrosis     Pulmonary hypertension     Raynaud's disease     Scleroderma, diffuse     Sjogren's syndrome     Vitamin D deficiency 11/14/2013       Past Surgical History:   Procedure Laterality Date    24 HOUR IMPEDANCE PH MONITORING OF ESOPHAGUS IN PATIENT NOT TAKING ACID REDUCING MEDICATIONS N/A 03/04/2020    Procedure: IMPEDANCE PH STUDY, ESOPHAGEAL, 24 HOUR, IN PATIENT NOT TAKING ACID REDUCING MEDICATION;  Surgeon: Annamaria Mendoza MD;  Location: [facility] (4TH FLR);  Service: Endoscopy;  Laterality: N/A;  OFF PPI/H2 Blocker   Motility Studies   Hold Narcotics x 1 days   Hold TCA x 1 days  2/26 - LVM attempting to confirm appt  2/27 - Confirmed appt    ANORECTAL MANOMETRY N/A 05/10/2021    Procedure: MANOMETRY, ANORECTAL with balloon expulsion test;  Surgeon: Annamaria Mendoza MD;  Location: [facility] (4TH FLR);  Service: Endoscopy;  Laterality: N/A;  order combined  covid test 5/7 [employer], instructions emailed-KPvt    BREAST BIOPSY      Left, benign    BRONCHOSCOPY N/A 10/2/2023    Procedure: bronch;  Surgeon: Roberta Leigh DO;  [facility];  Service: Endoscopy;  Laterality: N/A;    BRONCHOSCOPY N/A 9/16/2024    Procedure: Flexible bronchoscopy (BAL only);  Surgeon: Roberta Leigh DO;  [facility];  Service: Endoscopy;  Laterality: N/A;    CERVICAL CONIZATION   W/ LASER  1970    COLONOSCOPY       COLONOSCOPY N/A 03/29/2019    Procedure: COLONOSCOPY;  Surgeon: Annamaria Mendoza MD;  Location: [facility] (2ND FLR);  Service: Endoscopy;  Laterality: N/A;    COLONOSCOPY N/A 05/10/2021    Procedure: COLONOSCOPY;  Surgeon: Annamaria Mendoza MD;  Location: [facility] (4TH FLR);  Service: Endoscopy;  Laterality: N/A;  2nd floor-previous scopes done on 2nd floor, gastroparesis  full liquid diet x2 days, clear liquid x1 day prior to procedure  covid test 5/7 [employer], instructions emailed-[facility]  5/6 pt confirmed appt-[facility]    DILATION AND CURETTAGE OF UTERUS      ESOPHAGEAL MANOMETRY WITH MEASUREMENT OF IMPEDANCE N/A 03/04/2020    Procedure: MANOMETRY, ESOPHAGUS, WITH IMPEDANCE MEASUREMENT;  Surgeon: Annamaria Mendoza MD;  Location: [facility] (4TH FLR);  Service: Endoscopy;  Laterality: N/A;  OFF PPI/H2 Blocker   Motility Studies   Hold Narcotics x 1 days   Hold TCA x 1 days    ESOPHAGOGASTRODUODENOSCOPY      ESOPHAGOGASTRODUODENOSCOPY N/A 03/29/2019    Procedure: EGD (ESOPHAGOGASTRODUODENOSCOPY);  Surgeon: Annamaria Mendoza MD;  Location: [facility] (2ND FLR);  Service: Endoscopy;  Laterality: N/A;  pulmonary htn    ESOPHAGOGASTRODUODENOSCOPY N/A 02/02/2021    Procedure: ESOPHAGOGASTRODUODENOSCOPY (EGD);  Surgeon: Annamaria Mendoza MD;  Location: [facility] (2ND FLR);  Service: Endoscopy;  Laterality: N/A;  2nd floor gastroparesis  3 days full liquid diet and 1 day clears  covid test 1/30 primary care, instructions sent to [facility]    ESOPHAGOGASTRODUODENOSCOPY N/A 07/01/2022    Procedure: ESOPHAGOGASTRODUODENOSCOPY (EGD);  Surgeon: Annamaria Mendoza MD;  Location: [facility] (2ND FLR);  Service: Endoscopy;  Laterality: N/A;  2nd floor-gastroparesis  full liquid diet x3 days, clear liquid diet x1 day prior to procedure  fully vaccinated[facility]  [facility]pt confirmed new arrival time-[facility]    EXCISION, LESION, STOMACH, LAPAROSCOPIC N/A 03/23/2023    Procedure: XI ROBOTIC EXCISION, LESION, STOMACH;   Surgeon: Katherine Segura MD;  Location: [facility] OR 2ND FLR;  Service: General;  Laterality: N/A;    EXCISIONAL BIOPSY, LYMPH NODE Right 05/26/2023    Procedure: EXCISIONAL BIOPSY, LYMPH NODE, INGUINAL;  Surgeon: Katherine Segura MD;  Location: [facility] OR 2ND FLR;  Service: General;  Laterality: Right;    HYSTERECTOMY  1990    FRANDY (AUB, Fibroids), ovaries remain    REPAIR, HERNIA, UMBILICAL N/A 03/23/2023    Procedure: REPAIR, HERNIA, UMBILICAL;  Surgeon: Katherine Segura MD;  Location: [facility] OR 2ND FLR;  Service: General;  Laterality: N/A;    RIGHT HEART CATHETERIZATION Right 01/05/2021    Procedure: INSERTION, CATHETER, RIGHT HEART;  Surgeon: Aureliano Sy MD;  Location: [facility] CATH LAB;  Service: Cardiology;  Laterality: Right;    RIGHT HEART CATHETERIZATION Right 03/16/2023    Procedure: INSERTION, CATHETER, RIGHT HEART;  Surgeon: Aureliano Sy MD;  Location: [facility] CATH LAB;  Service: Cardiology;  Laterality: Right;    RIGHT HEART CATHETERIZATION Right 11/22/2024    Procedure: INSERTION, CATHETER, RIGHT HEART;  Surgeon: Tamanna Martins MD;  Location: [facility] CATH LAB;  Service: Cardiology;  Laterality: Right;    ROBOT-ASSISTED LAPAROSCOPIC REPAIR OF INGUINAL HERNIA USING DA VERNELL XI Right 05/26/2023    Procedure: XI ROBOTIC REPAIR, HERNIA, INGUINAL, FEMORAL;  Surgeon: Katherine Segura MD;  Location: [facility] OR [address] [place] [address];  Service: General;  Laterality: Right;    ROBOT-ASSISTED REPAIR OF HIATAL HERNIA USING DA VERNELL XI N/A 03/23/2023    Procedure: XI ROBOTIC REPAIR, HERNIA, HIATAL;  Surgeon: Katherine Segura MD;  [facility];  Service: General;  Laterality: N/A;    VARICOSE VEIN SURGERY      XI ROBOTIC GASTROPEXY N/A 03/23/2023    Procedure: XI ROBOTIC GASTROPEXY;  Surgeon: Katherine Segura MD;  Location: [facility] OR 2ND FLR;  Service: General;  Laterality: N/A;    XI ROBOTIC PYLOROMYOTOMY N/A 03/23/2023    Procedure: XI ROBOTIC PYLOROMYOTOMY;   Surgeon: Katherine Segura MD;  Location: [facility] OR [address] [place] [address];  Service: General;  Laterality: N/A;        Objective:     Vitals  Wt Readings from Last 1 Encounters:   25 63.9 kg (140 lb 14 oz)     Temp Readings from Last 1 Encounters:   25 97.9 °F (36.6 °C) (Oral)     BP Readings from Last 1 Encounters:   25 127/75     Pulse Readings from Last 1 Encounters:   25 79       Dermatological Exam    Skin:  Skin of both feet thickened (scleroderma), stiff, with extensive ulcerations of both feet down to fat with slowly improving granulation; xerosis  Tender hypergranular tissue at dorsal 2nd toe PIPJ    Nails:  10 nail(s) thickened, and 10 nail(s) discolored    Vascular Exam    Arteries:  Posterior tibial artery palpable on right  Dorsalis pedis artery palpable on right  Posterior tibial artery palpable on left  Dorsalis pedis artery palpable on left    Veins:  Superficial veins unremarkable on right  Superficial veins unremarkable on left    Swellin+ nonpitting on right  1+ nonpitting on left    Neurological Exam    Flushing touch test:  6/6 sites sensed, light touch intact     Musculoskeletal Exam    Footwear:  Casual on right  Casual on left    Gait Exam:   Ambulatory Status: Ambulatory  Gait: Normal  Assistive Devices: None    Foot Progression Angle:  Normal on right  Normal on left    Right Lower Extremity Additional Findings:  Right foot and ankle function, strength, and range of motion unremarkable except as noted above.     Left Lower Extremity Additional Findings:  2nd hammertoe  Left foot and ankle function, strength, and range of motion unremarkable except as noted above.    Imaging and Other Tests    Imaging:  Independently reviewed and interpreted imaging, findings are as follows:     25 left foot X rays: 2nd hammertoe deformity, centered at PIPJ     Assessment:     Encounter Diagnosis   Name Primary?    Ulcers of both lower extremities with fat layer exposed Yes            Plan:     I counseled the patient on her conditions, their implications and medical management.    Neuropathy: chronic stable  -Failed amitriptyline.   -Continue duloxetine 120 mg/day.      Bilateral ulcers, cellulitis, swelling: chronic stable   -Debrided, see procedure note.   -Dressings changed. Leave intact as long as possible then continue usual dressing changes.   -Protected WB in surgical shoes.      -Antibiotic plan per ID.      Scleroderma: chronic stable  -Continue PO tadalafil 40 mg/day.      I spent a total of 40 minutes on the day of the visit.  This includes face to face time and non-face to face time preparing to see the patient (eg, review of tests), obtaining and/or reviewing separately obtained history, documenting clinical information in the electronic or other health record, independently interpreting results and communicating results to the patient/family/caregiver, or care coordinator.     Return to clinic in 1-2 weeks, call sooner PRN.

## 2025-03-13 ENCOUNTER — PATIENT MESSAGE (OUTPATIENT)
Dept: TRANSPLANT | Facility: CLINIC | Age: 75
End: 2025-03-13
Payer: MEDICARE

## 2025-03-13 ENCOUNTER — HOSPITAL ENCOUNTER (OUTPATIENT)
Dept: INTERVENTIONAL RADIOLOGY/VASCULAR | Facility: HOSPITAL | Age: 75
Discharge: HOME | End: 2025-03-13
Attending: STUDENT IN AN ORGANIZED HEALTH CARE EDUCATION/TRAINING PROGRAM | Admitting: STUDENT IN AN ORGANIZED HEALTH CARE EDUCATION/TRAINING PROGRAM
Payer: MEDICARE

## 2025-03-13 VITALS
WEIGHT: 130 LBS | SYSTOLIC BLOOD PRESSURE: 168 MMHG | HEIGHT: 65 IN | RESPIRATION RATE: 14 BRPM | HEART RATE: 71 BPM | BODY MASS INDEX: 21.66 KG/M2 | DIASTOLIC BLOOD PRESSURE: 76 MMHG | OXYGEN SATURATION: 99 % | TEMPERATURE: 98 F

## 2025-03-13 DIAGNOSIS — L03.116 CELLULITIS OF LEFT LOWER EXTREMITY: ICD-10-CM

## 2025-03-13 PROCEDURE — 36573 INSJ PICC RS&I 5 YR+: CPT | Mod: RT,TXP | Performed by: STUDENT IN AN ORGANIZED HEALTH CARE EDUCATION/TRAINING PROGRAM

## 2025-03-13 PROCEDURE — 94799 UNLISTED PULMONARY SVC/PX: CPT | Mod: TXP

## 2025-03-13 PROCEDURE — 94760 N-INVAS EAR/PLS OXIMETRY 1: CPT | Mod: TXP

## 2025-03-13 PROCEDURE — 99900035 HC TECH TIME PER 15 MIN (STAT): Mod: TXP

## 2025-03-13 PROCEDURE — 63600175 PHARM REV CODE 636 W HCPCS: Mod: TXP | Performed by: STUDENT IN AN ORGANIZED HEALTH CARE EDUCATION/TRAINING PROGRAM

## 2025-03-13 RX ORDER — LIDOCAINE HYDROCHLORIDE 10 MG/ML
1 INJECTION, SOLUTION EPIDURAL; INFILTRATION; INTRACAUDAL; PERINEURAL ONCE
Status: DISCONTINUED | OUTPATIENT
Start: 2025-03-13 | End: 2025-03-14 | Stop reason: HOSPADM

## 2025-03-13 RX ORDER — SODIUM CHLORIDE 9 MG/ML
INJECTION, SOLUTION INTRAVENOUS CONTINUOUS
Status: DISCONTINUED | OUTPATIENT
Start: 2025-03-13 | End: 2025-03-14 | Stop reason: HOSPADM

## 2025-03-13 RX ORDER — FENTANYL CITRATE 50 UG/ML
INJECTION, SOLUTION INTRAMUSCULAR; INTRAVENOUS
Status: COMPLETED | OUTPATIENT
Start: 2025-03-13 | End: 2025-03-13

## 2025-03-13 RX ADMIN — FENTANYL CITRATE 25 MCG: 50 INJECTION, SOLUTION INTRAMUSCULAR; INTRAVENOUS at 09:03

## 2025-03-13 NOTE — DISCHARGE SUMMARY
Radiology Discharge Summary      Hospital Course: No complications    Admit Date: 3/13/2025  Discharge Date: 03/13/2025     Instructions Given to Patient: Yes  Diet: Resume prior diet  Activity: Activity as tolerated and no driving for today    Description of Condition on Discharge: Stable  Vital Signs (Most Recent): Temp: 97.3 °F (36.3 °C) (03/13/25 0804)  Pulse: 66 (03/13/25 0804)  Resp: 18 (03/13/25 0804)  BP: (!) 158/69 (03/13/25 0804)  SpO2: 99 % (03/13/25 0804)    Discharge Disposition: Home    Discharge Diagnosis: bilateral foot wounds     Follow-up: Follow-up with referring provider    Estefania Parsons MD

## 2025-03-13 NOTE — Clinical Note
Right: Chest.   Scrubbed with Chlorohexidine.   Painted with Chlorohexidine.  Skin prep dry before draping.

## 2025-03-13 NOTE — H&P
H&P Note  Interventional Radiology    Reason for Consult: line placement    SUBJECTIVE:     Chief Complaint: bilateral foot wounds    History of Present Illness: 74F PMHx scleroderma, PVD, and bilateral foot wounds requiring IV antibiotics. Presenting for line placement.     Past Medical History:   Diagnosis Date    Abnormal Pap smear     Acid reflux     Allergy     Arthritis     Encounter for blood transfusion     GERD (gastroesophageal reflux disease)     Hiatal hernia 03/24/2023    History of migraine headaches     Hx of colonic polyp     Hypertension     Idiopathic neuropathy 07/20/2012    ILD (interstitial lung disease) 11/06/2013    Iron deficiency anemia 03/18/2014    MRSA carrier     Osteopenia     Pneumonia     Pulmonary fibrosis     Pulmonary hypertension     Raynaud's disease     Scleroderma, diffuse     Sjogren's syndrome     Vitamin D deficiency 11/14/2013     Past Surgical History:   Procedure Laterality Date    24 HOUR IMPEDANCE PH MONITORING OF ESOPHAGUS IN PATIENT NOT TAKING ACID REDUCING MEDICATIONS N/A 03/04/2020    Procedure: IMPEDANCE PH STUDY, ESOPHAGEAL, 24 HOUR, IN PATIENT NOT TAKING ACID REDUCING MEDICATION;  Surgeon: Annamaria Mendoza MD;  [facility]  Service: Endoscopy;  Laterality: N/A;  OFF PPI/H2 Blocker   Motility Studies   Hold Narcotics x 1 days   Hold TCA x 1 days  2/26 - LVM attempting to confirm appt  2/27 - Confirmed appt    ANORECTAL MANOMETRY N/A 05/10/2021    Procedure: MANOMETRY, ANORECTAL with balloon expulsion test;  Surgeon: Annamaria Mendoza MD;  [facility]  Service: Endoscopy;  Laterality: N/A;  order combined  covid test 5/7 [employer], instructions emailed-KP[facility]    BREAST BIOPSY      Left, benign    BRONCHOSCOPY N/A 10/2/2023    Procedure: bronch;  Surgeon: Roberta Leigh DO;  [facility];  Service: Endoscopy;  Laterality: N/A;    BRONCHOSCOPY N/A 9/16/2024    Procedure: Flexible bronchoscopy (BAL only);  Surgeon:  Roberta Leigh DO;  Location: [facility] OR 2ND FLR;  Service: Endoscopy;  Laterality: N/A;    CERVICAL CONIZATION   W/ LASER  1970    COLONOSCOPY      COLONOSCOPY N/A 03/29/2019    Procedure: COLONOSCOPY;  Surgeon: Annamaria Mendoza MD;  Location: [facility] (2ND FLR);  Service: Endoscopy;  Laterality: N/A;    COLONOSCOPY N/A 05/10/2021    Procedure: COLONOSCOPY;  Surgeon: Annamaria Mendoza MD;  Location: [facility] (4TH FLR);  Service: Endoscopy;  Laterality: N/A;  2nd floor-previous scopes done on 2nd floor, gastroparesis  full liquid diet x2 days, clear liquid x1 day prior to procedure  covid test 5/7 [employer], instructions emailed-[facility]  5/6 pt confirmed appt-[facility]    DILATION AND CURETTAGE OF UTERUS      ESOPHAGEAL MANOMETRY WITH MEASUREMENT OF IMPEDANCE N/A 03/04/2020    Procedure: MANOMETRY, ESOPHAGUS, WITH IMPEDANCE MEASUREMENT;  Surgeon: Annamaria Mendoza MD;  Location: [facility] (4TH FLR);  Service: Endoscopy;  Laterality: N/A;  OFF PPI/H2 Blocker   Motility Studies   Hold Narcotics x 1 days   Hold TCA x 1 days    ESOPHAGOGASTRODUODENOSCOPY      ESOPHAGOGASTRODUODENOSCOPY N/A 03/29/2019    Procedure: EGD (ESOPHAGOGASTRODUODENOSCOPY);  Surgeon: Annamaria Mendoza MD;  Location: [facility] (2ND FLR);  Service: Endoscopy;  Laterality: N/A;  pulmonary htn    ESOPHAGOGASTRODUODENOSCOPY N/A 02/02/2021    Procedure: ESOPHAGOGASTRODUODENOSCOPY (EGD);  Surgeon: Annamaria Mendoza MD;  Location: [facility] (2ND FLR);  Service: Endoscopy;  Laterality: N/A;  2nd floor gastroparesis  3 days full liquid diet and 1 day clears  covid test 1/30 primary care, instructions sent to [facility]    ESOPHAGOGASTRODUODENOSCOPY N/A 07/01/2022    Procedure: ESOPHAGOGASTRODUODENOSCOPY (EGD);  Surgeon: Annamaria Mendoza MD;  Location: [facility] (2ND FLR);  Service: Endoscopy;  Laterality: N/A;  2nd floor-gastroparesis  full liquid diet x3 days, clear liquid diet x1 day prior to procedure  fully vaccinated, instructions sent to  [facility]  6/29-pt confirmed new arrival time-[facility]    EXCISION, LESION, STOMACH, LAPAROSCOPIC N/A 03/23/2023    Procedure: XI ROBOTIC EXCISION, LESION, STOMACH;  Surgeon: Katherine Segura MD;  Location: [facility] OR [address] [place] [address];  Service: General;  Laterality: N/A;    EXCISIONAL BIOPSY, LYMPH NODE Right 05/26/2023    Procedure: EXCISIONAL BIOPSY, LYMPH NODE, INGUINAL;  Surgeon: Katherine Segura MD;  [facility];  Service: General;  Laterality: Right;    HYSTERECTOMY  1990    FRANDY (AUB, Fibroids), ovaries remain    REPAIR, HERNIA, UMBILICAL N/A 03/23/2023    Procedure: REPAIR, HERNIA, UMBILICAL;  Surgeon: Katherine Segura MD;  Location: [facility] OR [address] [place] [address];  Service: General;  Laterality: N/A;    RIGHT HEART CATHETERIZATION Right 01/05/2021    Procedure: INSERTION, CATHETER, RIGHT HEART;  Surgeon: Aureliano Sy MD;  Location: [facility] CATH LAB;  Service: Cardiology;  Laterality: Right;    RIGHT HEART CATHETERIZATION Right 03/16/2023    Procedure: INSERTION, CATHETER, RIGHT HEART;  Surgeon: Aureliano Sy MD;  Location: [facility] CATH LAB;  Service: Cardiology;  Laterality: Right;    RIGHT HEART CATHETERIZATION Right 11/22/2024    Procedure: INSERTION, CATHETER, RIGHT HEART;  Surgeon: Tamanna Martins MD;  Location: [facility] CATH LAB;  Service: Cardiology;  Laterality: Right;    ROBOT-ASSISTED LAPAROSCOPIC REPAIR OF INGUINAL HERNIA USING DA VERNELL XI Right 05/26/2023    Procedure: XI ROBOTIC REPAIR, HERNIA, INGUINAL, FEMORAL;  Surgeon: Katherine Segura MD;  Location: [address];  Service: General;  Laterality: Right;    ROBOT-ASSISTED REPAIR OF HIATAL HERNIA USING DA VERNELL XI N/A 03/23/2023    Procedure: XI ROBOTIC REPAIR, HERNIA, HIATAL;  Surgeon: Katherine Segura MD;  Location: [facility] OR [address] [place] [address];  Service: General;  Laterality: N/A;    VARICOSE VEIN SURGERY      XI ROBOTIC GASTROPEXY N/A 03/23/2023    Procedure: XI ROBOTIC GASTROPEXY;  Surgeon: Katherine TEJADA  Francisco Segura MD;  [facility];  Service: General;  Laterality: N/A;    XI ROBOTIC PYLOROMYOTOMY N/A 03/23/2023    Procedure: XI ROBOTIC PYLOROMYOTOMY;  Surgeon: Katherine Segura MD;  [facility];  Service: General;  Laterality: N/A;     Family History   Problem Relation Name Age of Onset    Breast cancer Mother Tiki Singh     Cancer Mother Tiki Singh         Breast    Hypertension Father Madi     Diabetes Father Madi         Amputation of legs    Breast cancer Sister Brenda     Diabetes Sister Brenda     Arthritis Sister Brenda     Cancer Sister Brenda         Breast    Osteoarthritis Brother Luis     Diabetes Brother Luis     Arthritis Brother Luis     Birth defects Brother Luis         Polio at birth, recently had knee replacement surgery on left knee    No Known Problems Daughter      No Known Problems Daughter      No Known Problems Son      No Known Problems Son      Breast cancer Maternal Aunt Gege     Cancer Maternal Aunt Gege         Breast    Early death Sister Philomena     Melanoma Neg Hx      Colon cancer Neg Hx      Crohn's disease Neg Hx      Stomach cancer Neg Hx      Ulcerative colitis Neg Hx      Rectal cancer Neg Hx      Irritable bowel syndrome Neg Hx      Esophageal cancer Neg Hx      Celiac disease Neg Hx      Ovarian cancer Neg Hx      Liver cancer Neg Hx      Pancreatic cancer Neg Hx       Social History[1]    Review of Systems:  Constitutional/General:No fever, chills, change in appetite or weight loss.  Hematological/Immuno: no known coagulopathies  Respiratory: no shortness of breath  Cardiovascular: no chest pain  Gastrointestinal: no abdominal pain  Genito-Urinary: no dysuria  Musculoskeletal: negative  Skin: Negative for rash, itching, pigmentation changes, nail or hair changes.  Neurological: no TIA or stroke symptoms  Psychiatric: normal mood/affect, good insight/judgement      OBJECTIVE:     Vital Signs Range (Last 24H):  Temp:   "[97.3 °F (36.3 °C)]   Pulse:  [66-79]   Resp:  [18]   BP: (127-158)/(69-75)   SpO2:  [99 %]     Physical Exam:  General- Patient AAO x3 in NAD  ENT- EOMI  Neck- No masses  CV- Normal rate  Resp-  No increased WOB  GI- Non-distended  Extrem- No cyanosis, clubbing, edema  Derm- No rashes, masses, or lesions noted  Neuro-  No focal deficits noted    Physical Exam  Body mass index is 21.63 kg/m².    Scheduled Meds:    LIDOcaine (PF) 10 mg/ml (1%)  1 mL Other Once     Continuous Infusions:    0.9% NaCl   Intravenous Continuous         PRN Meds:    Allergies:   Review of patient's allergies indicates:   Allergen Reactions    Sulfamethoxazole      Other Reaction(s): Tramadol    rash    Spironolactone Tinitus    Sulfa (sulfonamide antibiotics) Rash     Other reaction(s): Rash    Other reaction(s): Rash   Other reaction(s): Rash    Tramadol Itching and Rash       Labs:  No results for input(s): "INR", "PT", "PTT" in the last 168 hours.  No results for input(s): "WBC", "HGB", "HCT", "MCV", "PLT" in the last 168 hours. No results for input(s): "GLU", "NA", "K", "CL", "CO2", "BUN", "CREATININE", "CALCIUM", "MG", "ALT", "AST", "ALBUMIN", "BILITOT", "BILIDIR" in the last 168 hours.    Vitals (Most Recent):  Temp: 97.3 °F (36.3 °C) (03/13/25 0804)  Pulse: 66 (03/13/25 0804)  Resp: 18 (03/13/25 0804)  BP: (!) 158/69 (03/13/25 0804)  SpO2: 99 % (03/13/25 0804)    ASA: 3  Mallampati: 2    Consent obtained.     ASSESSMENT/PLAN:     74F PMHx bilateral foot wounds  - Tunneled small bore CVC placement with up to moderate sedation     There are no hospital problems to display for this patient.      Discussed how the procedure will be performed, risk/benefits, possible complications, pre-post procedure expectations, and alternatives. The patient voices understanding and all questions have been answered.  The patient agrees to proceed as planned.    Estefania Parsnos MD         [1]   Social History  Tobacco Use    Smoking status: Never     " Passive exposure: Never    Smokeless tobacco: Never   Substance Use Topics    Alcohol use: Not Currently    Drug use: No

## 2025-03-13 NOTE — PROCEDURES
Interventional Radiology Post-Procedure Note    Pre Op Diagnosis: bilateral foot wounds    Post Op Diagnosis: Same    Procedure: Tunneled central venous catheter placement    Procedure performed by:  Estefania Parsons MD    Written Informed Consent Obtained: Yes    Specimen Removed: No    Estimated Blood Loss:  Minimal     Findings:   The right internal jugular vein is sonographically patent.  Successful placement of right-sided tunneled 6 Fr x 22cm central venous catheter with catheter tip in the right atrium.     The patient tolerated the procedure without complication. The central venous catheter is ready for immediate use.     Estefania Parsons MD  Interventional Radiology

## 2025-03-14 ENCOUNTER — PATIENT MESSAGE (OUTPATIENT)
Dept: ADMINISTRATIVE | Facility: OTHER | Age: 75
End: 2025-03-14
Payer: MEDICARE

## 2025-03-17 ENCOUNTER — INFUSION (OUTPATIENT)
Dept: INFECTIOUS DISEASES | Facility: HOSPITAL | Age: 75
End: 2025-03-17
Payer: MEDICARE

## 2025-03-17 VITALS
TEMPERATURE: 98 F | WEIGHT: 140.13 LBS | SYSTOLIC BLOOD PRESSURE: 122 MMHG | HEART RATE: 74 BPM | DIASTOLIC BLOOD PRESSURE: 57 MMHG | HEIGHT: 65 IN | RESPIRATION RATE: 19 BRPM | BODY MASS INDEX: 23.35 KG/M2 | OXYGEN SATURATION: 98 %

## 2025-03-17 DIAGNOSIS — R79.9 ABNORMAL FINDING OF BLOOD CHEMISTRY, UNSPECIFIED: Primary | ICD-10-CM

## 2025-03-17 DIAGNOSIS — D50.9 IRON DEFICIENCY ANEMIA, UNSPECIFIED IRON DEFICIENCY ANEMIA TYPE: ICD-10-CM

## 2025-03-17 PROCEDURE — 63600175 PHARM REV CODE 636 W HCPCS: Mod: TXP | Performed by: STUDENT IN AN ORGANIZED HEALTH CARE EDUCATION/TRAINING PROGRAM

## 2025-03-17 PROCEDURE — 25000003 PHARM REV CODE 250: Mod: TXP | Performed by: STUDENT IN AN ORGANIZED HEALTH CARE EDUCATION/TRAINING PROGRAM

## 2025-03-17 PROCEDURE — 96365 THER/PROPH/DIAG IV INF INIT: CPT | Mod: TXP

## 2025-03-17 RX ORDER — HEPARIN 100 UNIT/ML
500 SYRINGE INTRAVENOUS
Status: DISCONTINUED | OUTPATIENT
Start: 2025-03-17 | End: 2025-03-17 | Stop reason: HOSPADM

## 2025-03-17 RX ORDER — HEPARIN 100 UNIT/ML
500 SYRINGE INTRAVENOUS
OUTPATIENT
Start: 2025-03-17

## 2025-03-17 RX ORDER — SODIUM CHLORIDE 0.9 % (FLUSH) 0.9 %
10 SYRINGE (ML) INJECTION
OUTPATIENT
Start: 2025-03-17

## 2025-03-17 RX ADMIN — HEPARIN 500 UNITS: 100 SYRINGE at 11:03

## 2025-03-17 RX ADMIN — MEROPENEM 1 G: 1 INJECTION INTRAVENOUS at 10:03

## 2025-03-17 NOTE — PROGRESS NOTES
Pt. Arrives to infusion center for Meropenum infusion. PICC line in place right chest. Dressing clean dry and intact.    Limited head-to-toe assessment due to privacy issues and visit reason though the opportunity was given for patient to express any concerns

## 2025-03-19 ENCOUNTER — OFFICE VISIT (OUTPATIENT)
Dept: PODIATRY | Facility: CLINIC | Age: 75
End: 2025-03-19
Payer: MEDICARE

## 2025-03-19 ENCOUNTER — PATIENT MESSAGE (OUTPATIENT)
Dept: ADMINISTRATIVE | Facility: OTHER | Age: 75
End: 2025-03-19
Payer: MEDICARE

## 2025-03-19 VITALS
BODY MASS INDEX: 23.31 KG/M2 | SYSTOLIC BLOOD PRESSURE: 108 MMHG | HEIGHT: 65 IN | DIASTOLIC BLOOD PRESSURE: 82 MMHG | HEART RATE: 120 BPM

## 2025-03-19 DIAGNOSIS — L97.922 ULCERS OF BOTH LOWER EXTREMITIES WITH FAT LAYER EXPOSED: Primary | ICD-10-CM

## 2025-03-19 DIAGNOSIS — L03.116 CELLULITIS OF LEFT LOWER EXTREMITY: ICD-10-CM

## 2025-03-19 DIAGNOSIS — L97.912 ULCERS OF BOTH LOWER EXTREMITIES WITH FAT LAYER EXPOSED: Primary | ICD-10-CM

## 2025-03-19 PROCEDURE — 99214 OFFICE O/P EST MOD 30 MIN: CPT | Mod: PBBFAC | Performed by: STUDENT IN AN ORGANIZED HEALTH CARE EDUCATION/TRAINING PROGRAM

## 2025-03-19 PROCEDURE — 99215 OFFICE O/P EST HI 40 MIN: CPT | Mod: S$PBB,,, | Performed by: STUDENT IN AN ORGANIZED HEALTH CARE EDUCATION/TRAINING PROGRAM

## 2025-03-19 PROCEDURE — 99999 PR PBB SHADOW E&M-EST. PATIENT-LVL IV: CPT | Mod: PBBFAC,,, | Performed by: STUDENT IN AN ORGANIZED HEALTH CARE EDUCATION/TRAINING PROGRAM

## 2025-03-19 RX ORDER — MEROPENEM 1 G/1
INJECTION, POWDER, FOR SOLUTION INTRAVENOUS
COMMUNITY
Start: 2025-03-14

## 2025-03-19 NOTE — PROGRESS NOTES
Subjective:     Patient    Yamel Shah is a 74 y.o. female.    Problem    2024: Previously seen by multiple wound care providers without significant improvement in wounds, has bounced around a lot. Has bilateral foot wounds related to scleroderma and immunosuppressant medications. Had improvement in granulation with regular wound care, oral and topical antibiotics, one debridement. Attempted topical vitamin E but caused burning.     01/03/25: Returns for bilateral foot wound care. No new concerns. Had pain flare on left foot, still sensitive.     01/08/25: Returns for bilateral foot wound care. No new concerns.     01/16/25: Returns for bilateral foot wound care. Awaiting appointment with ID 02/14. No new concerns.     01/24/25: Returns for bilateral foot wound care. Pain controlled on current medications. No new concerns.     01/31/25: Returns for bilateral foot wound care. Not tolerating amitriptyline well; still with nerve pain, tingling, cramping, burning despite duloxetine, tolerates duloxetine well.     02/07/25: Returns for bilateral foot wound care. Increased duloxetine at last visit for BLE nerve pain with significant improvement in pain without side effects. ID appointment rescheduled to 02/24.     02/14/25: Returns for bilateral foot wound care. Wants to restart PO antibiotics until she sees ID, thinks she was progressing better at that time.     02/21/25: Returns for bilateral foot wound care. Wounds seem somewhat better on PO antibiotics, ID appointment next week.     02/25/25: Returns for bilateral foot wound care. Completed recent PO antibiotics, recent cultures + for Pseudomonas resistant to PO antibiotics; has first ID appointment tomorrow.     03/07/25: Returns for bilateral foot wound care. Getting OPAT per ID soon. Pain OK.     03/12/25: Returns for bilateral foot wound care. Will start antibiotics within next week. Pain OK except severe at left 2nd toe dorsally. Wants  debridement of left 2nd toe today.     03/19/25: Returns for bilateral foot wound care. On IV antibiotics. No new concerns.     History    History obtained from patient and review of medical records.     Past Medical History:   Diagnosis Date    Abnormal Pap smear     Acid reflux     Allergy     Arthritis     Encounter for blood transfusion     GERD (gastroesophageal reflux disease)     Hiatal hernia 03/24/2023    History of migraine headaches     Hx of colonic polyp     Hypertension     Idiopathic neuropathy 07/20/2012    ILD (interstitial lung disease) 11/06/2013    Iron deficiency anemia 03/18/2014    MRSA carrier     Osteopenia     Pneumonia     Pulmonary fibrosis     Pulmonary hypertension     Raynaud's disease     Scleroderma, diffuse     Sjogren's syndrome     Vitamin D deficiency 11/14/2013       Past Surgical History:   Procedure Laterality Date    24 HOUR IMPEDANCE PH MONITORING OF ESOPHAGUS IN PATIENT NOT TAKING ACID REDUCING MEDICATIONS N/A 03/04/2020    Procedure: IMPEDANCE PH STUDY, ESOPHAGEAL, 24 HOUR, IN PATIENT NOT TAKING ACID REDUCING MEDICATION;  Surgeon: Annamaria Mendoza MD;  [facility]  Service: Endoscopy;  Laterality: N/A;  OFF PPI/H2 Blocker   Motility Studies   Hold Narcotics x 1 days   Hold TCA x 1 days  2/26 - LVM attempting to confirm appt  2/27 - Confirmed appt    ANORECTAL MANOMETRY N/A 05/10/2021    Procedure: MANOMETRY, ANORECTAL with balloon expulsion test;  Surgeon: Annamaria Mendoza MD;  Location: [facility] (4TH FLR);  Service: Endoscopy;  Laterality: N/A;  order combined  covid test 5/7 [employer], instructions emailed-KPvt    BREAST BIOPSY      Left, benign    BRONCHOSCOPY N/A 10/2/2023    Procedure: bronch;  Surgeon: Roberta Leigh DO;  [facility];  Service: Endoscopy;  Laterality: N/A;    BRONCHOSCOPY N/A 9/16/2024    Procedure: Flexible bronchoscopy (BAL only);  Surgeon: Roberta Leigh DO;  Location: [facility] OR [address] [place] [address];  Service:  Endoscopy;  Laterality: N/A;    CERVICAL CONIZATION   W/ LASER  1970    COLONOSCOPY      COLONOSCOPY N/A 03/29/2019    Procedure: COLONOSCOPY;  Surgeon: Annamaria Mendoza MD;  Location: [facility] (2ND FLR);  Service: Endoscopy;  Laterality: N/A;    COLONOSCOPY N/A 05/10/2021    Procedure: COLONOSCOPY;  Surgeon: Annamaria Mendoza MD;  Location: [facility] (4TH FLR);  Service: Endoscopy;  Laterality: N/A;  2nd floor-previous scopes done on 2nd floor, gastroparesis  full liquid diet x2 days, clear liquid x1 day prior to procedure  covid test 5/7 [employer], instructions emailed-[facility]  5/6 pt confirmed appt-[facility]    DILATION AND CURETTAGE OF UTERUS      ESOPHAGEAL MANOMETRY WITH MEASUREMENT OF IMPEDANCE N/A 03/04/2020    Procedure: MANOMETRY, ESOPHAGUS, WITH IMPEDANCE MEASUREMENT;  Surgeon: Annamaria Mendoza MD;  Location: [facility] (4TH FLR);  Service: Endoscopy;  Laterality: N/A;  OFF PPI/H2 Blocker   Motility Studies   Hold Narcotics x 1 days   Hold TCA x 1 days    ESOPHAGOGASTRODUODENOSCOPY      ESOPHAGOGASTRODUODENOSCOPY N/A 03/29/2019    Procedure: EGD (ESOPHAGOGASTRODUODENOSCOPY);  Surgeon: Annamaria Mendoza MD;  Location: [facility] (2ND FLR);  Service: Endoscopy;  Laterality: N/A;  pulmonary htn    ESOPHAGOGASTRODUODENOSCOPY N/A 02/02/2021    Procedure: ESOPHAGOGASTRODUODENOSCOPY (EGD);  Surgeon: Annamaria Mendoza MD;  Location: [facility] (2ND FLR);  Service: Endoscopy;  Laterality: N/A;  2nd floor gastroparesis  3 days full liquid diet and 1 day clears  covid test 1/30 primary care, instructions sent to [facility]    ESOPHAGOGASTRODUODENOSCOPY N/A 07/01/2022    Procedure: ESOPHAGOGASTRODUODENOSCOPY (EGD);  Surgeon: Annamaria Mendoza MD;  Location: [facility] (2ND FLR);  Service: Endoscopy;  Laterality: N/A;  2nd floor-gastroparesis  full liquid diet x3 days, clear liquid diet x1 day prior to procedure  fully vaccinated, instructions sent to [facility]  6/29-pt confirmed new arrival time-Kpvt    EXCISION, LESION,  STOMACH, LAPAROSCOPIC N/A 03/23/2023    Procedure: XI ROBOTIC EXCISION, LESION, STOMACH;  Surgeon: Katherine Segura MD;  [facility];  Service: General;  Laterality: N/A;    EXCISIONAL BIOPSY, LYMPH NODE Right 05/26/2023    Procedure: EXCISIONAL BIOPSY, LYMPH NODE, INGUINAL;  Surgeon: Katherine Segura MD;  [facility];  Service: General;  Laterality: Right;    HYSTERECTOMY  1990    FRANDY (AUB, Fibroids), ovaries remain    REPAIR, HERNIA, UMBILICAL N/A 03/23/2023    Procedure: REPAIR, HERNIA, UMBILICAL;  Surgeon: Katherine Segura MD;  [facility];  Service: General;  Laterality: N/A;    RIGHT HEART CATHETERIZATION Right 01/05/2021    Procedure: INSERTION, CATHETER, RIGHT HEART;  Surgeon: Aureliano Sy MD;  Location: [facility] CATH LAB;  Service: Cardiology;  Laterality: Right;    RIGHT HEART CATHETERIZATION Right 03/16/2023    Procedure: INSERTION, CATHETER, RIGHT HEART;  Surgeon: Aureliano Sy MD;  Location: [facility] CATH LAB;  Service: Cardiology;  Laterality: Right;    RIGHT HEART CATHETERIZATION Right 11/22/2024    Procedure: INSERTION, CATHETER, RIGHT HEART;  Surgeon: Tamanna Martins MD;  Location: [facility] CATH LAB;  Service: Cardiology;  Laterality: Right;    ROBOT-ASSISTED LAPAROSCOPIC REPAIR OF INGUINAL HERNIA USING DA VERNELL XI Right 05/26/2023    Procedure: XI ROBOTIC REPAIR, HERNIA, INGUINAL, FEMORAL;  Surgeon: Katherine Segura MD;  [facility];  Service: General;  Laterality: Right;    ROBOT-ASSISTED REPAIR OF HIATAL HERNIA USING DA VERNELL XI N/A 03/23/2023    Procedure: XI ROBOTIC REPAIR, HERNIA, HIATAL;  Surgeon: Katherine Segura MD;  [facility];  Service: General;  Laterality: N/A;    VARICOSE VEIN SURGERY      XI ROBOTIC GASTROPEXY N/A 03/23/2023    Procedure: XI ROBOTIC GASTROPEXY;  Surgeon: Katherine Segura MD;  [facility];  Service: General;  Laterality:  N/A;    XI ROBOTIC PYLOROMYOTOMY N/A 2023    Procedure: XI ROBOTIC PYLOROMYOTOMY;  Surgeon: Katherine Segura MD;  Location: [facility] OR [address] [place] [address];  Service: General;  Laterality: N/A;        Objective:     Vitals  Wt Readings from Last 1 Encounters:   25 63.6 kg (140 lb 1.6 oz)     Temp Readings from Last 1 Encounters:   25 97.9 °F (36.6 °C) (Oral)     BP Readings from Last 1 Encounters:   25 108/82     Pulse Readings from Last 1 Encounters:   25 (!) 120       Dermatological Exam    Skin:  Skin of both feet thickened (scleroderma), stiff, with extensive ulcerations of both feet down to fat with slowly improving granulation; xerosis  Tender hypergranular tissue at dorsal 2nd toe PIPJ less prominent s/p debridement    Nails:  10 nail(s) thickened, and 10 nail(s) discolored    Vascular Exam    Arteries:  Posterior tibial artery palpable on right  Dorsalis pedis artery palpable on right  Posterior tibial artery palpable on left  Dorsalis pedis artery palpable on left    Veins:  Superficial veins unremarkable on right  Superficial veins unremarkable on left    Swellin+ nonpitting on right  1+ nonpitting on left    Neurological Exam    [place] touch test:  6/6 sites sensed, light touch intact     Musculoskeletal Exam    Footwear:  Casual on right  Casual on left    Gait Exam:   Ambulatory Status: Ambulatory  Gait: Normal  Assistive Devices: None    Foot Progression Angle:  Normal on right  Normal on left    Right Lower Extremity Additional Findings:  Right foot and ankle function, strength, and range of motion unremarkable except as noted above.     Left Lower Extremity Additional Findings:  2nd hammertoe  Left foot and ankle function, strength, and range of motion unremarkable except as noted above.    Imaging and Other Tests    Imagin/26/25 left foot X rays: 2nd hammertoe deformity, centered at PIPJ    Independently reviewed and interpreted imaging, findings are as follows:  N/A     Assessment:     Encounter Diagnoses   Name Primary?    Ulcers of both lower extremities with fat layer exposed Yes    Cellulitis of left lower extremity          Plan:     I counseled the patient on her conditions, their implications and medical management.    Neuropathy: chronic stable  -Failed amitriptyline.   -Continue duloxetine 120 mg/day.      Bilateral ulcers, cellulitis, swelling: chronic stable    -Dressings changed. Leave intact as long as possible then continue usual dressing changes.   -Protected WB in surgical shoes.      -Antibiotic plan per ID.      Scleroderma: chronic stable  -Continue PO tadalafil 40 mg/day.      I spent a total of 40 minutes on the day of the visit.  This includes face to face time and non-face to face time preparing to see the patient (eg, review of tests), obtaining and/or reviewing separately obtained history, documenting clinical information in the electronic or other health record, independently interpreting results and communicating results to the patient/family/caregiver, or care coordinator.     Return to clinic in 1-2 weeks, call sooner PRN.

## 2025-03-21 ENCOUNTER — TELEPHONE (OUTPATIENT)
Dept: TRANSPLANT | Facility: CLINIC | Age: 75
End: 2025-03-21
Payer: MEDICARE

## 2025-03-21 DIAGNOSIS — G89.4 CHRONIC PAIN DISORDER: Primary | ICD-10-CM

## 2025-03-21 DIAGNOSIS — M34.9 SCLERODERMA WITH PULMONARY INVOLVEMENT: ICD-10-CM

## 2025-03-21 RX ORDER — ACETAMINOPHEN AND CODEINE PHOSPHATE 300; 60 MG/1; MG/1
1 TABLET ORAL NIGHTLY
Qty: 30 TABLET | Refills: 0 | Status: SHIPPED | OUTPATIENT
Start: 2025-03-25 | End: 2025-04-24

## 2025-03-21 RX ORDER — NINTEDANIB 100 MG/1
1 CAPSULE ORAL 2 TIMES DAILY
Qty: 60 CAPSULE | Refills: 11 | Status: SHIPPED | OUTPATIENT
Start: 2025-03-21

## 2025-03-21 NOTE — TELEPHONE ENCOUNTER
Patient requesting refill on: Tylenol #4  Last office visit: 02.04.25   shows last refill on: 02.25.25  Patient does have a pain contract on file with [facility] department  Patient last UDS: 02.04.25

## 2025-03-21 NOTE — TELEPHONE ENCOUNTER
Contacted [facility] to request PA for OFEV 100 mg. Provided with key to Cover my Meds: JANNA  Authorization of PA noted 3/21/25.

## 2025-03-24 ENCOUNTER — LAB REQUISITION (OUTPATIENT)
Dept: LAB | Facility: HOSPITAL | Age: 75
End: 2025-03-24
Payer: MEDICARE

## 2025-03-24 DIAGNOSIS — S91.302A UNSPECIFIED OPEN WOUND, LEFT FOOT, INITIAL ENCOUNTER: ICD-10-CM

## 2025-03-24 DIAGNOSIS — G89.4 CHRONIC PAIN DISORDER: Primary | ICD-10-CM

## 2025-03-24 LAB
ABSOLUTE EOSINOPHIL (OHS): 0.24 K/UL
ABSOLUTE MONOCYTE (OHS): 0.41 K/UL (ref 0.3–1)
ABSOLUTE NEUTROPHIL COUNT (OHS): 4.36 K/UL (ref 1.8–7.7)
ALBUMIN SERPL BCP-MCNC: 3 G/DL (ref 3.5–5.2)
ALP SERPL-CCNC: 97 UNIT/L (ref 40–150)
ALT SERPL W/O P-5'-P-CCNC: 12 UNIT/L (ref 10–44)
ANION GAP (OHS): 6 MMOL/L (ref 8–16)
AST SERPL-CCNC: 24 UNIT/L (ref 11–45)
BASOPHILS # BLD AUTO: 0.04 K/UL
BASOPHILS NFR BLD AUTO: 0.6 %
BILIRUB SERPL-MCNC: 0.3 MG/DL (ref 0.1–1)
BUN SERPL-MCNC: 16 MG/DL (ref 8–23)
CALCIUM SERPL-MCNC: 8.4 MG/DL (ref 8.7–10.5)
CHLORIDE SERPL-SCNC: 107 MMOL/L (ref 95–110)
CO2 SERPL-SCNC: 25 MMOL/L (ref 23–29)
CREAT SERPL-MCNC: 0.6 MG/DL (ref 0.5–1.4)
CRP SERPL-MCNC: 43.6 MG/L
ERYTHROCYTE [DISTWIDTH] IN BLOOD BY AUTOMATED COUNT: 14.3 % (ref 11.5–14.5)
GFR SERPLBLD CREATININE-BSD FMLA CKD-EPI: >60 ML/MIN/1.73/M2
GLUCOSE SERPL-MCNC: 75 MG/DL (ref 70–110)
HCT VFR BLD AUTO: 34.3 % (ref 37–48.5)
HGB BLD-MCNC: 11.4 GM/DL (ref 12–16)
IMM GRANULOCYTES # BLD AUTO: 0.02 K/UL (ref 0–0.04)
IMM GRANULOCYTES NFR BLD AUTO: 0.3 % (ref 0–0.5)
LYMPHOCYTES # BLD AUTO: 1.15 K/UL (ref 1–4.8)
MCH RBC QN AUTO: 32.6 PG (ref 27–50)
MCHC RBC AUTO-ENTMCNC: 33.2 G/DL (ref 32–36)
MCV RBC AUTO: 98 FL (ref 82–98)
NUCLEATED RBC (/100WBC) (OHS): 0 /100 WBC
PLATELET # BLD AUTO: 157 K/UL (ref 150–450)
PMV BLD AUTO: 11.5 FL (ref 9.2–12.9)
POTASSIUM SERPL-SCNC: 3.9 MMOL/L (ref 3.5–5.1)
PROT SERPL-MCNC: 8 GM/DL (ref 6–8.4)
RBC # BLD AUTO: 3.5 M/UL (ref 4–5.4)
RELATIVE EOSINOPHIL (OHS): 3.9 %
RELATIVE LYMPHOCYTE (OHS): 18.5 % (ref 18–48)
RELATIVE MONOCYTE (OHS): 6.6 % (ref 4–15)
RELATIVE NEUTROPHIL (OHS): 70.1 % (ref 38–73)
SODIUM SERPL-SCNC: 138 MMOL/L (ref 136–145)
WBC # BLD AUTO: 6.22 K/UL (ref 3.9–12.7)

## 2025-03-24 PROCEDURE — 80053 COMPREHEN METABOLIC PANEL: CPT | Mod: TXP | Performed by: STUDENT IN AN ORGANIZED HEALTH CARE EDUCATION/TRAINING PROGRAM

## 2025-03-24 PROCEDURE — 86140 C-REACTIVE PROTEIN: CPT | Mod: TXP | Performed by: STUDENT IN AN ORGANIZED HEALTH CARE EDUCATION/TRAINING PROGRAM

## 2025-03-24 PROCEDURE — 85025 COMPLETE CBC W/AUTO DIFF WBC: CPT | Mod: TXP | Performed by: STUDENT IN AN ORGANIZED HEALTH CARE EDUCATION/TRAINING PROGRAM

## 2025-03-25 RX ORDER — PREGABALIN 300 MG/1
300 CAPSULE ORAL 2 TIMES DAILY
Qty: 60 CAPSULE | Refills: 6 | Status: SHIPPED | OUTPATIENT
Start: 2025-03-25 | End: 2025-09-23

## 2025-03-26 ENCOUNTER — OFFICE VISIT (OUTPATIENT)
Dept: PODIATRY | Facility: CLINIC | Age: 75
End: 2025-03-26
Payer: MEDICARE

## 2025-03-26 ENCOUNTER — TELEPHONE (OUTPATIENT)
Dept: PODIATRY | Facility: CLINIC | Age: 75
End: 2025-03-26
Payer: MEDICARE

## 2025-03-26 ENCOUNTER — TELEPHONE (OUTPATIENT)
Dept: INFECTIOUS DISEASES | Facility: CLINIC | Age: 75
End: 2025-03-26
Payer: MEDICARE

## 2025-03-26 VITALS
HEART RATE: 82 BPM | SYSTOLIC BLOOD PRESSURE: 125 MMHG | BODY MASS INDEX: 23.36 KG/M2 | DIASTOLIC BLOOD PRESSURE: 65 MMHG | WEIGHT: 140.19 LBS | HEIGHT: 65 IN

## 2025-03-26 DIAGNOSIS — L97.922 ULCERS OF BOTH LOWER EXTREMITIES WITH FAT LAYER EXPOSED: ICD-10-CM

## 2025-03-26 DIAGNOSIS — L97.912 ULCERS OF BOTH LOWER EXTREMITIES WITH FAT LAYER EXPOSED: ICD-10-CM

## 2025-03-26 DIAGNOSIS — L03.119 CELLULITIS OF LOWER EXTREMITY, UNSPECIFIED LATERALITY: ICD-10-CM

## 2025-03-26 PROCEDURE — 99214 OFFICE O/P EST MOD 30 MIN: CPT | Mod: S$PBB,,, | Performed by: STUDENT IN AN ORGANIZED HEALTH CARE EDUCATION/TRAINING PROGRAM

## 2025-03-26 PROCEDURE — 99999 PR PBB SHADOW E&M-EST. PATIENT-LVL IV: CPT | Mod: PBBFAC,,, | Performed by: STUDENT IN AN ORGANIZED HEALTH CARE EDUCATION/TRAINING PROGRAM

## 2025-03-26 PROCEDURE — 99214 OFFICE O/P EST MOD 30 MIN: CPT | Mod: PBBFAC | Performed by: STUDENT IN AN ORGANIZED HEALTH CARE EDUCATION/TRAINING PROGRAM

## 2025-03-26 RX ORDER — MUPIROCIN 20 MG/G
OINTMENT TOPICAL DAILY
Qty: 66 G | Refills: 3 | Status: SHIPPED | OUTPATIENT
Start: 2025-03-26

## 2025-03-26 NOTE — TELEPHONE ENCOUNTER
Patient's , Mr. Saucedo, stopped by the ID clinic.   Mrs. Shah had an appointment with Dr. Nava today and was told she needs to continue another week of IV antibiotics, follow up with Dr. Cruz, and repeat cultures.    Patient is scheduled to end IV therapy on 4/1/2025.     Please advice

## 2025-03-27 NOTE — PROGRESS NOTES
Subjective:     Patient    Yamel Shah is a 74 y.o. female.    Problem    2024: Previously seen by multiple wound care providers without significant improvement in wounds, has bounced around a lot. Has bilateral foot wounds related to scleroderma and immunosuppressant medications. Had improvement in granulation with regular wound care, oral and topical antibiotics, one debridement. Attempted topical vitamin E but caused burning.     01/03/25: Returns for bilateral foot wound care. No new concerns. Had pain flare on left foot, still sensitive.     01/08/25: Returns for bilateral foot wound care. No new concerns.     01/16/25: Returns for bilateral foot wound care. Awaiting appointment with ID 02/14. No new concerns.     01/24/25: Returns for bilateral foot wound care. Pain controlled on current medications. No new concerns.     01/31/25: Returns for bilateral foot wound care. Not tolerating amitriptyline well; still with nerve pain, tingling, cramping, burning despite duloxetine, tolerates duloxetine well.     02/07/25: Returns for bilateral foot wound care. Increased duloxetine at last visit for BLE nerve pain with significant improvement in pain without side effects. ID appointment rescheduled to 02/24.     02/14/25: Returns for bilateral foot wound care. Wants to restart PO antibiotics until she sees ID, thinks she was progressing better at that time.     02/21/25: Returns for bilateral foot wound care. Wounds seem somewhat better on PO antibiotics, ID appointment next week.     02/25/25: Returns for bilateral foot wound care. Completed recent PO antibiotics, recent cultures + for Pseudomonas resistant to PO antibiotics; has first ID appointment tomorrow.     03/07/25: Returns for bilateral foot wound care. Getting OPAT per ID soon. Pain OK.     03/12/25: Returns for bilateral foot wound care. Will start antibiotics within next week. Pain OK except severe at left 2nd toe dorsally. Wants  debridement of left 2nd toe today.     03/19/25: Returns for bilateral foot wound care. On IV antibiotics. No new concerns.     03/27/25: Returns for bilateral foot wound care. Still on IV antibiotics. No new concerns.     History    History obtained from patient and review of medical records.     Past Medical History:   Diagnosis Date    Abnormal Pap smear     Acid reflux     Allergy     Arthritis     Encounter for blood transfusion     GERD (gastroesophageal reflux disease)     Hiatal hernia 03/24/2023    History of migraine headaches     Hx of colonic polyp     Hypertension     Idiopathic neuropathy 07/20/2012    ILD (interstitial lung disease) 11/06/2013    Iron deficiency anemia 03/18/2014    MRSA carrier     Osteopenia     Pneumonia     Pulmonary fibrosis     Pulmonary hypertension     Raynaud's disease     Scleroderma, diffuse     Sjogren's syndrome     Vitamin D deficiency 11/14/2013       Past Surgical History:   Procedure Laterality Date    24 HOUR IMPEDANCE PH MONITORING OF ESOPHAGUS IN PATIENT NOT TAKING ACID REDUCING MEDICATIONS N/A 03/04/2020    Procedure: IMPEDANCE PH STUDY, ESOPHAGEAL, 24 HOUR, IN PATIENT NOT TAKING ACID REDUCING MEDICATION;  Surgeon: Annamaria Mendoza MD;  Location: [facility] ([address] [place] [address]);  Service: Endoscopy;  Laterality: N/A;  OFF PPI/H2 Blocker   Motility Studies   Hold Narcotics x 1 days   Hold TCA x 1 days  2/26 - LVM attempting to confirm appt  2/27 - Confirmed appt    ANORECTAL MANOMETRY N/A 05/10/2021    Procedure: MANOMETRY, ANORECTAL with balloon expulsion test;  Surgeon: Annamaria Mendoza MD;  Location: [facility] (4TH FLR);  Service: Endoscopy;  Laterality: N/A;  order combined  covid test 5/7 [employer], instructions emailed-KPvt    BREAST BIOPSY      Left, benign    BRONCHOSCOPY N/A 10/2/2023    Procedure: bronch;  Surgeon: Roberta Leigh DO;  Location: [facility] OR [address] [place] [address];  Service: Endoscopy;  Laterality: N/A;    BRONCHOSCOPY N/A 9/16/2024    Procedure: Flexible  bronchoscopy (BAL only);  Surgeon: Roberta Leigh DO;  Location: [facility] OR 2ND FLR;  Service: Endoscopy;  Laterality: N/A;    CERVICAL CONIZATION   W/ LASER  1970    COLONOSCOPY      COLONOSCOPY N/A 03/29/2019    Procedure: COLONOSCOPY;  Surgeon: Annamaria Mendoza MD;  Location: [facility] (2ND FLR);  Service: Endoscopy;  Laterality: N/A;    COLONOSCOPY N/A 05/10/2021    Procedure: COLONOSCOPY;  Surgeon: Annamaria Mendoza MD;  Location: [facility] (4TH FLR);  Service: Endoscopy;  Laterality: N/A;  2nd floor-previous scopes done on 2nd floor, gastroparesis  full liquid diet x2 days, clear liquid x1 day prior to procedure  covid test 5/7 [employer], instructions emailed-[facility]  5/6 pt confirmed appt-[facility]    DILATION AND CURETTAGE OF UTERUS      ESOPHAGEAL MANOMETRY WITH MEASUREMENT OF IMPEDANCE N/A 03/04/2020    Procedure: MANOMETRY, ESOPHAGUS, WITH IMPEDANCE MEASUREMENT;  Surgeon: Annamaria Mendoza MD;  Location: [facility] (4TH FLR);  Service: Endoscopy;  Laterality: N/A;  OFF PPI/H2 Blocker   Motility Studies   Hold Narcotics x 1 days   Hold TCA x 1 days    ESOPHAGOGASTRODUODENOSCOPY      ESOPHAGOGASTRODUODENOSCOPY N/A 03/29/2019    Procedure: EGD (ESOPHAGOGASTRODUODENOSCOPY);  Surgeon: Annamaria Mendoza MD;  Location: [facility] (2ND FLR);  Service: Endoscopy;  Laterality: N/A;  pulmonary htn    ESOPHAGOGASTRODUODENOSCOPY N/A 02/02/2021    Procedure: ESOPHAGOGASTRODUODENOSCOPY (EGD);  Surgeon: Annamaria Mendoza MD;  Location: [facility] (2ND FLR);  Service: Endoscopy;  Laterality: N/A;  2nd floor gastroparesis  3 days full liquid diet and 1 day clears  covid test 1/30 primary care, instructions sent to [facility]    ESOPHAGOGASTRODUODENOSCOPY N/A 07/01/2022    Procedure: ESOPHAGOGASTRODUODENOSCOPY (EGD);  Surgeon: Annamaria Mendoza MD;  Location: [facility] (2ND FLR);  Service: Endoscopy;  Laterality: N/A;  2nd floor-gastroparesis  full liquid diet x3 days, clear liquid diet x1 day prior to procedure  fully vaccinated,  instructions sent to [facility]  6/29-pt confirmed new arrival time-Kpvt    EXCISION, LESION, STOMACH, LAPAROSCOPIC N/A 03/23/2023    Procedure: XI ROBOTIC EXCISION, LESION, STOMACH;  Surgeon: Katherine Segura MD;  Location: [facility] OR [address] [place] [address];  Service: General;  Laterality: N/A;    EXCISIONAL BIOPSY, LYMPH NODE Right 05/26/2023    Procedure: EXCISIONAL BIOPSY, LYMPH NODE, INGUINAL;  Surgeon: Katherine Segura MD;  [facility];  Service: General;  Laterality: Right;    HYSTERECTOMY  1990    FRANDY (AUB, Fibroids), ovaries remain    REPAIR, HERNIA, UMBILICAL N/A 03/23/2023    Procedure: REPAIR, HERNIA, UMBILICAL;  Surgeon: Katherine Segura MD;  Location: [facility] OR [address] [place] [address];  Service: General;  Laterality: N/A;    RIGHT HEART CATHETERIZATION Right 01/05/2021    Procedure: INSERTION, CATHETER, RIGHT HEART;  Surgeon: Aureliano Sy MD;  Location: [facility] CATH LAB;  Service: Cardiology;  Laterality: Right;    RIGHT HEART CATHETERIZATION Right 03/16/2023    Procedure: INSERTION, CATHETER, RIGHT HEART;  Surgeon: Aureliano Sy MD;  Location: [facility] CATH LAB;  Service: Cardiology;  Laterality: Right;    RIGHT HEART CATHETERIZATION Right 11/22/2024    Procedure: INSERTION, CATHETER, RIGHT HEART;  Surgeon: Tamanna Martins MD;  Location: [facility] CATH LAB;  Service: Cardiology;  Laterality: Right;    ROBOT-ASSISTED LAPAROSCOPIC REPAIR OF INGUINAL HERNIA USING DA VERNELL XI Right 05/26/2023    Procedure: XI ROBOTIC REPAIR, HERNIA, INGUINAL, FEMORAL;  Surgeon: Katherine Segura MD;  Location: [facility] OR [address] [place] [address];  Service: General;  Laterality: Right;    ROBOT-ASSISTED REPAIR OF HIATAL HERNIA USING DA VERNELL XI N/A 03/23/2023    Procedure: XI ROBOTIC REPAIR, HERNIA, HIATAL;  Surgeon: Katherine Segura MD;  Location: [facility] OR [address] [place] [address];  Service: General;  Laterality: N/A;    VARICOSE VEIN SURGERY      XI ROBOTIC GASTROPEXY N/A 03/23/2023    Procedure: XI ROBOTIC GASTROPEXY;   Surgeon: Katherine Segura MD;  [facility];  Service: General;  Laterality: N/A;    XI ROBOTIC PYLOROMYOTOMY N/A 2023    Procedure: XI ROBOTIC PYLOROMYOTOMY;  Surgeon: Katherine Segura MD;  Location: [facility] OR 2ND FLR;  Service: General;  Laterality: N/A;        Objective:     Vitals  Wt Readings from Last 1 Encounters:   25 63.6 kg (140 lb 3.4 oz)     Temp Readings from Last 1 Encounters:   25 97.9 °F (36.6 °C) (Oral)     BP Readings from Last 1 Encounters:   25 125/65     Pulse Readings from Last 1 Encounters:   25 82       Dermatological Exam    Skin:  Skin of both feet thickened (scleroderma), stiff, with extensive ulcerations of both feet down to fat with slowly improving granulation; xerosis  Tender hypergranular tissue at dorsal 2nd toe PIPJ less prominent s/p debridement    Nails:  10 nail(s) thickened, and 10 nail(s) discolored    Vascular Exam    Arteries:  Posterior tibial artery palpable on right  Dorsalis pedis artery palpable on right  Posterior tibial artery palpable on left  Dorsalis pedis artery palpable on left    Veins:  Superficial veins unremarkable on right  Superficial veins unremarkable on left    Swellin+ nonpitting on right  1+ nonpitting on left    Neurological Exam    [place] touch test:  6/6 sites sensed, light touch intact     Musculoskeletal Exam    Footwear:  Casual on right  Casual on left    Gait Exam:   Ambulatory Status: Ambulatory  Gait: Normal  Assistive Devices: None    Foot Progression Angle:  Normal on right  Normal on left    Right Lower Extremity Additional Findings:  Right foot and ankle function, strength, and range of motion unremarkable except as noted above.     Left Lower Extremity Additional Findings:  2nd hammertoe  Left foot and ankle function, strength, and range of motion unremarkable except as noted above.    Imaging and Other Tests    Imagin/26/25 left foot X rays: 2nd hammertoe deformity,  [facility]    Independently reviewed and interpreted imaging, findings are as follows: N/A     Assessment:     Encounter Diagnoses   Name Primary?    Cellulitis of lower extremity, unspecified laterality     Ulcers of both lower extremities with fat layer exposed          Plan:     I counseled the patient on her conditions, their implications and medical management.    Neuropathy: chronic stable  -Failed amitriptyline.   -Continue duloxetine 120 mg/day.      Bilateral ulcers, cellulitis, swelling: chronic stable    -Dressings changed. Leave intact as long as possible then continue usual dressing changes.   -Protected WB in surgical shoes.      -Antibiotic plan per ID. May reculture before line is pulled.     Scleroderma: chronic stable  -Continue PO tadalafil 40 mg/day.      I spent a total of 30 minutes on the day of the visit.  This includes face to face time and non-face to face time preparing to see the patient (eg, review of tests), obtaining and/or reviewing separately obtained history, documenting clinical information in the electronic or other health record, independently interpreting results and communicating results to the patient/family/caregiver, or care coordinator.     Return to clinic in 1-2 weeks, call sooner PRN.

## 2025-03-28 ENCOUNTER — OFFICE VISIT (OUTPATIENT)
Dept: TRANSPLANT | Facility: CLINIC | Age: 75
End: 2025-03-28
Payer: MEDICARE

## 2025-03-28 ENCOUNTER — HOSPITAL ENCOUNTER (OUTPATIENT)
Dept: PULMONOLOGY | Facility: CLINIC | Age: 75
Discharge: HOME | End: 2025-03-28
Payer: MEDICARE

## 2025-03-28 ENCOUNTER — HOSPITAL ENCOUNTER (OUTPATIENT)
Dept: CARDIOLOGY | Facility: HOSPITAL | Age: 75
Discharge: HOME | End: 2025-03-28
Payer: MEDICARE

## 2025-03-28 VITALS
DIASTOLIC BLOOD PRESSURE: 62 MMHG | BODY MASS INDEX: 23.36 KG/M2 | HEIGHT: 65 IN | SYSTOLIC BLOOD PRESSURE: 120 MMHG | WEIGHT: 140.19 LBS | HEART RATE: 74 BPM

## 2025-03-28 VITALS — HEIGHT: 65 IN | WEIGHT: 130 LBS | BODY MASS INDEX: 21.66 KG/M2

## 2025-03-28 VITALS
WEIGHT: 130 LBS | BODY MASS INDEX: 21.66 KG/M2 | HEIGHT: 65 IN | DIASTOLIC BLOOD PRESSURE: 63 MMHG | HEART RATE: 66 BPM | SYSTOLIC BLOOD PRESSURE: 128 MMHG

## 2025-03-28 DIAGNOSIS — I27.21 WHO GROUP 1 PULMONARY ARTERIAL HYPERTENSION: Primary | ICD-10-CM

## 2025-03-28 DIAGNOSIS — I27.9 CHRONIC PULMONARY HEART DISEASE: ICD-10-CM

## 2025-03-28 DIAGNOSIS — J84.9 ILD (INTERSTITIAL LUNG DISEASE): ICD-10-CM

## 2025-03-28 DIAGNOSIS — I27.20 PULMONARY HYPERTENSION: ICD-10-CM

## 2025-03-28 DIAGNOSIS — M34.9 SCLERODERMA WITH PULMONARY INVOLVEMENT: ICD-10-CM

## 2025-03-28 LAB
ASCENDING AORTA: 2.85 CM
AV AREA BY CONTINUOUS VTI: 2.2 CM2
AV INDEX (PROSTH): 0.66
AV LVOT MEAN GRADIENT: 3 MMHG
AV LVOT PEAK GRADIENT: 5 MMHG
AV MEAN GRADIENT: 7 MMHG
AV PEAK GRADIENT: 13 MMHG
AV VALVE AREA BY VELOCITY RATIO: 2.3 CM²
AV VALVE AREA: 2.3 CM2
AV VELOCITY RATIO: 0.67
BSA FOR ECHO PROCEDURE: 1.71 M2
CV ECHO LV RWT: 0.3 CM
DOP CALC AO PEAK VEL: 1.8 M/S
DOP CALC AO VTI: 41.8 CM
DOP CALC LVOT AREA: 3.5 CM2
DOP CALC LVOT DIAMETER: 2.1 CM
DOP CALC LVOT PEAK VEL: 1.2 M/S
DOP CALC LVOT STROKE VOLUME: 95.5 CM3
DOP CALCLVOT PEAK VEL VTI: 27.6 CM
E WAVE DECELERATION TIME: 143 MS
E/A RATIO: 1.12
E/E' RATIO: 10 M/S
ECHO EF ESTIMATED: 68 %
ECHO LV POSTERIOR WALL: 0.8 CM (ref 0.6–1.1)
FRACTIONAL SHORTENING: 37.7 % (ref 28–44)
INTERVENTRICULAR SEPTUM: 0.8 CM (ref 0.6–1.1)
IVRT: 63 MS
LA MAJOR: 5.6 CM
LA MINOR: 4.7 CM
LA WIDTH: 4.4 CM
LEFT ATRIUM SIZE: 4.3 CM
LEFT ATRIUM VOLUME INDEX MOD: 48 ML/M2
LEFT ATRIUM VOLUME INDEX: 48 ML/M2
LEFT ATRIUM VOLUME MOD: 82 ML
LEFT ATRIUM VOLUME: 82 CM3
LEFT INTERNAL DIMENSION IN SYSTOLE: 3.3 CM (ref 2.1–4)
LEFT VENTRICLE DIASTOLIC VOLUME INDEX: 80 ML/M2
LEFT VENTRICLE DIASTOLIC VOLUME: 136 ML
LEFT VENTRICLE MASS INDEX: 88.3 G/M2
LEFT VENTRICLE SYSTOLIC VOLUME INDEX: 25.3 ML/M2
LEFT VENTRICLE SYSTOLIC VOLUME: 43 ML
LEFT VENTRICULAR INTERNAL DIMENSION IN DIASTOLE: 5.3 CM (ref 3.5–6)
LEFT VENTRICULAR MASS: 150.1 G
LV LATERAL E/E' RATIO: 8.4
LV SEPTAL E/E' RATIO: 11.5
MV A" WAVE DURATION": 114.18 MS
MV PEAK A VEL: 0.82 M/S
MV PEAK E VEL: 0.92 M/S
OHS CV RV/LV RATIO: 0.87 CM
PISA TR MAX VEL: 2.6 M/S
PULM VEIN A" WAVE DURATION": 114.18 MS
PULM VEIN S/D RATIO: 1.14
PULMONIC VEIN PEAK A VELOCITY: 0.3 M/S
PV PEAK D VEL: 0.5 M/S
PV PEAK S VEL: 0.57 M/S
RA MAJOR: 5.38 CM
RA PRESSURE ESTIMATED: 3 MMHG
RA WIDTH: 5.47 CM
RIGHT VENTRICLE DIASTOLIC BASEL DIMENSION: 4.6 CM
RV TB RVSP: 6 MMHG
RV TISSUE DOPPLER FREE WALL SYSTOLIC VELOCITY 1 (APICAL 4 CHAMBER VIEW): 14.64 CM/S
SINUS: 3.07 CM
STJ: 2.43 CM
TDI LATERAL: 0.11 M/S
TDI SEPTAL: 0.08 M/S
TDI: 0.1 M/S
TRICUSPID ANNULAR PLANE SYSTOLIC EXCURSION: 2.91 CM
TV PEAK GRADIENT: 27 MMHG
TV REST PULMONARY ARTERY PRESSURE: 30 MMHG
Z-SCORE OF LEFT VENTRICULAR DIMENSION IN END DIASTOLE: 1.14
Z-SCORE OF LEFT VENTRICULAR DIMENSION IN END SYSTOLE: 0.95

## 2025-03-28 PROCEDURE — 94618 PULMONARY STRESS TESTING: CPT | Mod: PBBFAC,NTX | Performed by: INTERNAL MEDICINE

## 2025-03-28 PROCEDURE — 93306 TTE W/DOPPLER COMPLETE: CPT | Mod: TXP

## 2025-03-28 PROCEDURE — 99999 PR PBB SHADOW E&M-EST. PATIENT-LVL III: CPT | Mod: PBBFAC,,,

## 2025-03-28 PROCEDURE — 93306 TTE W/DOPPLER COMPLETE: CPT | Mod: 26,NTX,, | Performed by: INTERNAL MEDICINE

## 2025-03-28 PROCEDURE — 99213 OFFICE O/P EST LOW 20 MIN: CPT | Mod: PBBFAC,25

## 2025-03-28 PROCEDURE — 99214 OFFICE O/P EST MOD 30 MIN: CPT | Mod: S$PBB,,,

## 2025-03-28 NOTE — PROCEDURES
Yamel Shah is a 74 y.o.   female patient, who presents for a 6 minute walk test ordered by SEAN Pelaez.  The diagnosis is Qualify for Oxygen; Interstitial Lung Disease; Connective Tissue Disease; Pulmonary Hypertension.  The patient's BMI is 21.6 kg/m2.  Predicted distance (lower limit of normal) is 317.63 meters.      Test Results:    The test was completed without stopping.  The total time walked was 360 seconds.  During walking, the patient reported:  Foot pain (ulcers).  The patient used no assistive devices during testing.  Oxygen saturation was measured with forehead pulse oximetry probe.     03/28/2025---------Distance: 327.66 meters (1075 feet)     O2 Sat % Supplemental Oxygen Heart Rate Blood Pressure Ju Scale Comment   Pre-exercise  (Resting) 100 % Room Air 78 bpm 129/62 mmHg 0 SpO2 by forehead oximetry probe.   During Exercise 98 % Room Air 84 bpm 139/61 mmHg 3    Post-exercise  (Recovery) 100 % Room Air  82 bpm        Recovery Time: 55 seconds    Performing nurse/tech: Estopinal KAYLEY      PREVIOUS STUDY:   08/22/2024---------Distance: 388.62 meters (1275 feet)       O2 Sat % Supplemental Oxygen Heart Rate Blood Pressure Ju Scale   Pre-exercise  (Resting) 97 % Room Air 68 bpm 125/63 mmHg 4   During Exercise 97 % Room Air 86 bpm 133/56 mmHg 3   Post-exercise  (Recovery) 99 % Room Air  78 bpm           CLINICAL INTERPRETATION:  Six minute walk distance is 327.66 meters (1075 feet) with moderate dyspnea.  During exercise, there was no significant desaturation while breathing room air.  Both blood pressure and heart rate remained stable with walking.  The patient reported non-pulmonary symptoms during exercise.  Since the previous study in August 2024, exercise capacity is significantly worse.  Based upon age and body mass index, exercise capacity is normal.

## 2025-03-28 NOTE — PROGRESS NOTES
Subjective:    Patient ID:  Yamel Shah is a 74 y.o. female who presents for follow-up of Pulmonary Hypertension.    HPI   Mrs. Shah was diagnosed with scleroderma in 1983, followed by Dr. Lim (and previously Dr. Boudreaux). She was on revatio in 2013, then switched to adcirca 40mg po daily, which she has remained on every since. Last clinic visit was February 2023. RHC in March, 2023 shows normal PA pressures, normal filling pressures, normal CO/CI. She is followed closely by Rheum and ALD/lung transplant.    Mrs. Shah is doing well. Reports baseline breathing. No new cardiopulm symptoms. Thinks fluid has been better managed with taking lasix once a day. Has an antibiotic infusion, at present, for non-healing wounds on her feet. Still participated in walk and maintained O2 sat. Independent of ADLs/IADLs. Patient denies chest pain, chest pressure, syncope, pre-syncope, lightheadedness, dizziness, PND, orthopnea, abdominal pain, abdominal pressure, or N/V/F/C.    6MWT:   8/22/2024 3/28/2025   6MW     6MWT Status completed without stopping  completed without stopping    Patient Reported Leg pain (PV) Other (Comment)    Was O2 used? No  No    6MW Distance walked (feet) 1275 feet  1075 feet    Distance walked (meters) 388.62 meters  327.66 meters    Did patient stop? No  No    Oxygen Saturation 97 %  100 %    Supplemental Oxygen Room Air (PV) Room Air    Heart Rate 68 bpm  78 bpm    Blood Pressure 125/63  129/62    Ju Dyspnea Rating  somewhat heavy  nothing at all    Oxygen Saturation 97 %  98 %    Supplemental Oxygen Room Air (PV) Room Air    Heart Rate 86 bpm  84 bpm    Blood Pressure 133/56  139/61    Unable to obtain     Ju Dyspnea Rating  moderate  moderate    Recovery Time (seconds) 65 seconds  55 seconds    Oxygen Saturation 99 %  100 %    Supplemental Oxygen Room Air (PV) Room Air    Heart Rate 78 bpm  82 bpm       ECHO: 3/28/2025    Left Ventricle: The left ventricle is normal in  size. Normal wall thickness. There is normal systolic function with a visually estimated ejection fraction of 60 - 65%. There is normal diastolic function.    Right Ventricle: The right ventricle is normal in size. Wall thickness is normal. Systolic function is normal.    Biatrial enlargement    Pulmonary Artery: The estimated pulmonary artery systolic pressure is 30 mmHg.    IVC/SVC: Normal venous pressure at 3 mmHg.    ECHO: 3/15/2024    Left Ventricle: The left ventricle is normal in size. Normal wall thickness. Normal wall motion. There is normal systolic function with a visually estimated ejection fraction of 55 - 60%. There is normal diastolic function.    Right Ventricle: Normal right ventricular cavity size. Wall thickness is normal. Right ventricle wall motion  is normal. Systolic function is normal.    The left atrium is moderately dilated.    Tricuspid Valve: There is mild regurgitation.    Pulmonary Artery: The estimated pulmonary artery systolic pressure is 43 mmHg.    IVC/SVC: Intermediate venous pressure at 8 mmHg.    ECHO: 8/17/2023  Left Ventricle: The left ventricle is normal in size. Normal wall thickness. Normal wall motion. There is normal systolic function with a visually estimated ejection fraction of 60 - 65%. There is normal diastolic function.    Right Ventricle: Normal right ventricular cavity size. Wall thickness is normal. Right ventricle wall motion  is normal. Systolic function is normal.    Pulmonary Artery: The estimated pulmonary artery systolic pressure is 40 mmHg.    RHC: 11/22/2024  RA: 8/ 6/ 6 RV: 32/ 3/ 8 PA: 32/ 10/ 18 PWP: 15/ 16/ 12 .   Cardiac output was 4.92 by Ivana. Cardiac index is 2.92 L/min/m2.   Thermodilution CO/CI 5.64/3.34.   Normal right and left sided filling pressures.   No pulmonary hypertension present.   TPG 6 PVR 1.22 ivana/ 1.06     RHC: 3/16/2023  RA: 7/ 5/ 5 RV: 35/ 1/ 6 PA: 35/ 10/ 18 PWP: 16/ 16/ 10 .   Cardiac output was 6.9. Cardiac index is 3.9  "L/min/m2.   O2 Sat: PA 74%.   Pulmonary vascular resistance: 1.1 JOLLEY.     SaO2:  95%  BSA 1.8 m2  HGB 12 g/dl.    PFTs:    8/22/2024 2/17/2025   Pulmonary Function Tests     FVC 1.77 liters  1.76 liters    FEV1 1.46 liters  1.41 liters    TLC (liters) 2.99 liters  3.01 liters    DLCO (ml/mmHg sec) 10.7 ml/mmHg sec  11.37 ml/mmHg sec    FVC% 61.9  72.2    FEV1% 66.2  74.7    FEF 25-75 1.48  1.36    FEF 25-75% 58.4  54.5    TLC% 58.6  59    RV 1.18  1.25    RV% 55.2  58.1    DLCO% 49.5  53          Review of Systems   Constitutional: Negative.   HENT: Negative.     Eyes: Negative.    Cardiovascular:  Positive for dyspnea on exertion and leg swelling. Negative for chest pain, near-syncope and syncope.   Respiratory: Negative.     Endocrine: Negative.    Hematologic/Lymphatic: Negative.    Skin: Negative.    Musculoskeletal: Negative.    Gastrointestinal: Negative.    Neurological: Negative.    Psychiatric/Behavioral: Negative.          Objective: /63 (BP Location: Left arm, Patient Position: Sitting)   Pulse 66   Ht 5' 5" (1.651 m)   Wt 59 kg (130 lb)   BMI 21.63 kg/m²       Physical Exam  Constitutional:       General: She is not in acute distress.     Appearance: Normal appearance. She is not ill-appearing.   HENT:      Head: Normocephalic.   Cardiovascular:      Rate and Rhythm: Normal rate and regular rhythm.      Pulses: Normal pulses.      Heart sounds: Normal heart sounds.   Pulmonary:      Effort: Pulmonary effort is normal.   Abdominal:      Palpations: Abdomen is soft.   Musculoskeletal:         General: Normal range of motion.      Cervical back: Normal range of motion.      Right lower leg: Edema present.      Left lower leg: Edema present.   Skin:     General: Skin is dry.      Capillary Refill: Capillary refill takes less than 2 seconds.      Comments: Tight skin, sclerotic changes   Neurological:      Mental Status: She is oriented to person, place, and time.   Psychiatric:         Mood and " Affect: Mood normal.         Behavior: Behavior normal.           Lab Results   Component Value Date    BNP 64 03/28/2025     03/28/2025     02/17/2025    K 4.2 03/28/2025    K 3.7 02/17/2025    MG 2.0 03/28/2025     03/28/2025     02/17/2025    CO2 25 03/28/2025    CO2 24 02/17/2025    BUN 18 03/28/2025    CREATININE 0.7 03/28/2025    GLU 94 02/17/2025    HGBA1C 5.0 10/14/2024    AST 19 03/28/2025    AST 29 02/17/2025    ALT 11 03/28/2025    ALT 17 02/17/2025    ALBUMIN 3.2 (L) 03/28/2025    ALBUMIN 3.3 (L) 02/17/2025    PROT 8.1 02/17/2025    BILITOT 0.3 03/28/2025    BILITOT 0.3 02/17/2025    CHOL 116 (L) 10/14/2024    HDL 44 10/14/2024    LDLCALC 56.2 (L) 10/14/2024    TRIG 79 10/14/2024       Magnesium    Date Value Ref Range Status   03/28/2025 2.0 1.6 - 2.6 mg/dL Final       Lab Results   Component Value Date    WBC 4.32 03/28/2025    HGB 12.0 03/28/2025    HCT 37.5 03/28/2025    MCV 96 03/28/2025     03/28/2025       BNP   Date Value Ref Range Status   03/28/2025 64 0 - 99 pg/mL Final     Comment:     Values of less than 100 pg/ml are consistent with non-CHF populations.    08/22/2024 67 0 - 99 pg/mL Final     Comment:     Values of less than 100 pg/ml are consistent with non-CHF populations.   04/29/2024 80 0 - 99 pg/mL Final     Comment:     Values of less than 100 pg/ml are consistent with non-CHF populations.   03/15/2024 81 0 - 99 pg/mL Final     Comment:     Values of less than 100 pg/ml are consistent with non-CHF populations.       WHO Group: 1  Functional Class: II  REVEAL Score: 4 (Low Risk)  Assessment/Plan:     Yamel Shah was seen today for pulmonary hypertension and follow-up.    Diagnoses and all orders for this visit:    WHO group 1 pulmonary arterial hypertension  Mrs. Shah reports baseline symptoms on monotherapy (tadalafil). Euvolemic on exam. REVEAL score low. ECHO with normal RV, normal PA pressure. Recent RHC (11/2024) showed no evidence of PH.  "    Scleroderma with pulmonary involvement  Followed closely by rheum.    ILD (interstitial lung disease)  Followed closely by pulm - "Followed for SSc-ILD with PAH. On MMF and OFEV. FVC/DLCO stable for the last two years."       Return to clinic in 6 months with labs, walk       Claire Pelaez DNP, JACINTA  [facility]            "

## 2025-03-31 ENCOUNTER — LAB REQUISITION (OUTPATIENT)
Dept: LAB | Facility: HOSPITAL | Age: 75
End: 2025-03-31
Payer: MEDICARE

## 2025-03-31 DIAGNOSIS — B96.5 PSEUDOMONAS (AERUGINOSA) (MALLEI) (PSEUDOMALLEI) AS THE CAUSE OF DISEASES CLASSIFIED ELSEWHERE: ICD-10-CM

## 2025-03-31 LAB
ABSOLUTE EOSINOPHIL (OHS): 0.34 K/UL
ABSOLUTE MONOCYTE (OHS): 0.35 K/UL (ref 0.3–1)
ABSOLUTE NEUTROPHIL COUNT (OHS): 2.46 K/UL (ref 1.8–7.7)
ALBUMIN SERPL BCP-MCNC: 3.2 G/DL (ref 3.5–5.2)
ALP SERPL-CCNC: 95 UNIT/L (ref 40–150)
ALT SERPL W/O P-5'-P-CCNC: 12 UNIT/L (ref 10–44)
ANION GAP (OHS): 9 MMOL/L (ref 8–16)
AST SERPL-CCNC: 22 UNIT/L (ref 11–45)
BASOPHILS # BLD AUTO: 0.05 K/UL
BASOPHILS NFR BLD AUTO: 1.1 %
BILIRUB SERPL-MCNC: 0.3 MG/DL (ref 0.1–1)
BUN SERPL-MCNC: 17 MG/DL (ref 8–23)
CALCIUM SERPL-MCNC: 8.7 MG/DL (ref 8.7–10.5)
CHLORIDE SERPL-SCNC: 103 MMOL/L (ref 95–110)
CO2 SERPL-SCNC: 26 MMOL/L (ref 23–29)
CREAT SERPL-MCNC: 0.7 MG/DL (ref 0.5–1.4)
CRP SERPL-MCNC: 11.4 MG/L
ERYTHROCYTE [DISTWIDTH] IN BLOOD BY AUTOMATED COUNT: 14.1 % (ref 11.5–14.5)
GFR SERPLBLD CREATININE-BSD FMLA CKD-EPI: >60 ML/MIN/1.73/M2
GLUCOSE SERPL-MCNC: 90 MG/DL (ref 70–110)
HCT VFR BLD AUTO: 35.7 % (ref 37–48.5)
HGB BLD-MCNC: 11.5 GM/DL (ref 12–16)
IMM GRANULOCYTES # BLD AUTO: 0.01 K/UL (ref 0–0.04)
IMM GRANULOCYTES NFR BLD AUTO: 0.2 % (ref 0–0.5)
LYMPHOCYTES # BLD AUTO: 1.29 K/UL (ref 1–4.8)
MCH RBC QN AUTO: 29.6 PG (ref 27–31)
MCHC RBC AUTO-ENTMCNC: 32.2 G/DL (ref 32–36)
MCV RBC AUTO: 92 FL (ref 82–98)
NUCLEATED RBC (/100WBC) (OHS): 0 /100 WBC
PLATELET # BLD AUTO: 186 K/UL (ref 150–450)
PMV BLD AUTO: 11.4 FL (ref 9.2–12.9)
POTASSIUM SERPL-SCNC: 4.4 MMOL/L (ref 3.5–5.1)
PROT SERPL-MCNC: 7.7 GM/DL (ref 6–8.4)
RBC # BLD AUTO: 3.89 M/UL (ref 4–5.4)
RELATIVE EOSINOPHIL (OHS): 7.6 %
RELATIVE LYMPHOCYTE (OHS): 28.7 % (ref 18–48)
RELATIVE MONOCYTE (OHS): 7.8 % (ref 4–15)
RELATIVE NEUTROPHIL (OHS): 54.6 % (ref 38–73)
SODIUM SERPL-SCNC: 138 MMOL/L (ref 136–145)
WBC # BLD AUTO: 4.5 K/UL (ref 3.9–12.7)

## 2025-03-31 PROCEDURE — 85025 COMPLETE CBC W/AUTO DIFF WBC: CPT | Mod: TXP | Performed by: STUDENT IN AN ORGANIZED HEALTH CARE EDUCATION/TRAINING PROGRAM

## 2025-03-31 PROCEDURE — 86140 C-REACTIVE PROTEIN: CPT | Mod: TXP | Performed by: STUDENT IN AN ORGANIZED HEALTH CARE EDUCATION/TRAINING PROGRAM

## 2025-03-31 PROCEDURE — 80053 COMPREHEN METABOLIC PANEL: CPT | Mod: TXP | Performed by: STUDENT IN AN ORGANIZED HEALTH CARE EDUCATION/TRAINING PROGRAM

## 2025-04-01 DIAGNOSIS — G60.9 IDIOPATHIC NEUROPATHY: ICD-10-CM

## 2025-04-02 RX ORDER — DULOXETIN HYDROCHLORIDE 60 MG/1
60 CAPSULE, DELAYED RELEASE ORAL 2 TIMES DAILY
Qty: 60 CAPSULE | Refills: 11 | Status: SHIPPED | OUTPATIENT
Start: 2025-04-02 | End: 2026-04-02

## 2025-04-04 ENCOUNTER — OFFICE VISIT (OUTPATIENT)
Dept: PODIATRY | Facility: CLINIC | Age: 75
End: 2025-04-04
Payer: MEDICARE

## 2025-04-04 VITALS
BODY MASS INDEX: 23.66 KG/M2 | SYSTOLIC BLOOD PRESSURE: 121 MMHG | DIASTOLIC BLOOD PRESSURE: 56 MMHG | HEART RATE: 73 BPM | HEIGHT: 65 IN | WEIGHT: 142 LBS

## 2025-04-04 DIAGNOSIS — L97.922 ULCERS OF BOTH LOWER EXTREMITIES WITH FAT LAYER EXPOSED: ICD-10-CM

## 2025-04-04 DIAGNOSIS — L03.119 CELLULITIS OF LOWER EXTREMITY, UNSPECIFIED LATERALITY: Primary | ICD-10-CM

## 2025-04-04 DIAGNOSIS — L97.912 ULCERS OF BOTH LOWER EXTREMITIES WITH FAT LAYER EXPOSED: ICD-10-CM

## 2025-04-04 PROCEDURE — 87205 SMEAR GRAM STAIN: CPT | Mod: TXP | Performed by: STUDENT IN AN ORGANIZED HEALTH CARE EDUCATION/TRAINING PROGRAM

## 2025-04-04 PROCEDURE — 87116 MYCOBACTERIA CULTURE: CPT | Mod: TXP | Performed by: STUDENT IN AN ORGANIZED HEALTH CARE EDUCATION/TRAINING PROGRAM

## 2025-04-04 PROCEDURE — 87102 FUNGUS ISOLATION CULTURE: CPT | Mod: TXP | Performed by: STUDENT IN AN ORGANIZED HEALTH CARE EDUCATION/TRAINING PROGRAM

## 2025-04-04 PROCEDURE — 99214 OFFICE O/P EST MOD 30 MIN: CPT | Mod: PBBFAC | Performed by: STUDENT IN AN ORGANIZED HEALTH CARE EDUCATION/TRAINING PROGRAM

## 2025-04-04 PROCEDURE — 99999 PR PBB SHADOW E&M-EST. PATIENT-LVL IV: CPT | Mod: PBBFAC,,, | Performed by: STUDENT IN AN ORGANIZED HEALTH CARE EDUCATION/TRAINING PROGRAM

## 2025-04-04 PROCEDURE — 87075 CULTR BACTERIA EXCEPT BLOOD: CPT | Mod: TXP | Performed by: STUDENT IN AN ORGANIZED HEALTH CARE EDUCATION/TRAINING PROGRAM

## 2025-04-04 PROCEDURE — 87070 CULTURE OTHR SPECIMN AEROBIC: CPT | Mod: TXP | Performed by: STUDENT IN AN ORGANIZED HEALTH CARE EDUCATION/TRAINING PROGRAM

## 2025-04-04 NOTE — PROGRESS NOTES
Subjective:     Patient    Yamel Shah is a 74 y.o. female.    Problem    2024: Previously seen by multiple wound care providers without significant improvement in wounds, has bounced around a lot. Has bilateral foot wounds related to scleroderma and immunosuppressant medications. Had improvement in granulation with regular wound care, oral and topical antibiotics, one debridement. Attempted topical vitamin E but caused burning.     01/03/25: Returns for bilateral foot wound care. No new concerns. Had pain flare on left foot, still sensitive.     01/08/25: Returns for bilateral foot wound care. No new concerns.     01/16/25: Returns for bilateral foot wound care. Awaiting appointment with ID 02/14. No new concerns.     01/24/25: Returns for bilateral foot wound care. Pain controlled on current medications. No new concerns.     01/31/25: Returns for bilateral foot wound care. Not tolerating amitriptyline well; still with nerve pain, tingling, cramping, burning despite duloxetine, tolerates duloxetine well.     02/07/25: Returns for bilateral foot wound care. Increased duloxetine at last visit for BLE nerve pain with significant improvement in pain without side effects. ID appointment rescheduled to 02/24.     02/14/25: Returns for bilateral foot wound care. Wants to restart PO antibiotics until she sees ID, thinks she was progressing better at that time.     02/21/25: Returns for bilateral foot wound care. Wounds seem somewhat better on PO antibiotics, ID appointment next week.     02/25/25: Returns for bilateral foot wound care. Completed recent PO antibiotics, recent cultures + for Pseudomonas resistant to PO antibiotics; has first ID appointment tomorrow.     03/07/25: Returns for bilateral foot wound care. Getting OPAT per ID soon. Pain OK.     03/12/25: Returns for bilateral foot wound care. Will start antibiotics within next week. Pain OK except severe at left 2nd toe dorsally. Wants  debridement of left 2nd toe today.     03/19/25: Returns for bilateral foot wound care. On IV antibiotics. No new concerns.     03/27/25: Returns for bilateral foot wound care. Still on IV antibiotics. No new concerns.     04/04/25: Returns for bilateral foot wound care. Stopped IV antibiotics but still has the line in. Going on vacation for 2 weeks soon.     History    History obtained from patient and review of medical records.     Past Medical History:   Diagnosis Date    Abnormal Pap smear     Acid reflux     Allergy     Arthritis     Encounter for blood transfusion     GERD (gastroesophageal reflux disease)     Hiatal hernia 03/24/2023    History of migraine headaches     Hx of colonic polyp     Hypertension     Idiopathic neuropathy 07/20/2012    ILD (interstitial lung disease) 11/06/2013    Iron deficiency anemia 03/18/2014    MRSA carrier     Osteopenia     Pneumonia     Pulmonary fibrosis     Pulmonary hypertension     Raynaud's disease     Scleroderma, diffuse     Sjogren's syndrome     Vitamin D deficiency 11/14/2013       Past Surgical History:   Procedure Laterality Date    24 HOUR IMPEDANCE PH MONITORING OF ESOPHAGUS IN PATIENT NOT TAKING ACID REDUCING MEDICATIONS N/A 03/04/2020    Procedure: IMPEDANCE PH STUDY, ESOPHAGEAL, 24 HOUR, IN PATIENT NOT TAKING ACID REDUCING MEDICATION;  Surgeon: Annamaria Mendoza MD;  Location: [facility] (4TH FLR);  Service: Endoscopy;  Laterality: N/A;  OFF PPI/H2 Blocker   Motility Studies   Hold Narcotics x 1 days   Hold TCA x 1 days  2/26 - LVM attempting to confirm appt  2/27 - Confirmed appt    ANORECTAL MANOMETRY N/A 05/10/2021    Procedure: MANOMETRY, ANORECTAL with balloon expulsion test;  Surgeon: Annamaria Mendoza MD;  Location: [facility] (4TH FLR);  Service: Endoscopy;  Laterality: N/A;  order combined  covid test 5/7 [employer], instructions emailed-KPvt    BREAST BIOPSY      Left, benign    BRONCHOSCOPY N/A 10/2/2023    Procedure: bronch;  Surgeon: Lu  Roberta RUIZ DO;  Location: [facility] OR 2ND FLR;  Service: Endoscopy;  Laterality: N/A;    BRONCHOSCOPY N/A 9/16/2024    Procedure: Flexible bronchoscopy (BAL only);  Surgeon: Roberta Leigh DO;  Location: [facility] OR 2ND FLR;  Service: Endoscopy;  Laterality: N/A;    CERVICAL CONIZATION   W/ LASER  1970    COLONOSCOPY      COLONOSCOPY N/A 03/29/2019    Procedure: COLONOSCOPY;  Surgeon: Annamaria Mendoza MD;  Location: [facility] ENDO (2ND FLR);  Service: Endoscopy;  Laterality: N/A;    COLONOSCOPY N/A 05/10/2021    Procedure: COLONOSCOPY;  Surgeon: Annamaria Mendoza MD;  Location: [facility] ENDO (4TH FLR);  Service: Endoscopy;  Laterality: N/A;  2nd floor-previous scopes done on 2nd floor, gastroparesis  full liquid diet x2 days, clear liquid x1 day prior to procedure  covid test 5/7 [employer], instructions emailed-[facility] [place]  5/6 pt confirmed appt-[facility] [place]    DILATION AND CURETTAGE OF UTERUS      ESOPHAGEAL MANOMETRY WITH MEASUREMENT OF IMPEDANCE N/A 03/04/2020    Procedure: MANOMETRY, ESOPHAGUS, WITH IMPEDANCE MEASUREMENT;  Surgeon: Annamaria Mendoza MD;  Location: [facility] (4TH FLR);  Service: Endoscopy;  Laterality: N/A;  OFF PPI/H2 Blocker   Motility Studies   Hold Narcotics x 1 days   Hold TCA x 1 days    ESOPHAGOGASTRODUODENOSCOPY      ESOPHAGOGASTRODUODENOSCOPY N/A 03/29/2019    Procedure: EGD (ESOPHAGOGASTRODUODENOSCOPY);  Surgeon: Annamaria Mendoza MD;  Location: [facility] ENDO (2ND FLR);  Service: Endoscopy;  Laterality: N/A;  pulmonary htn    ESOPHAGOGASTRODUODENOSCOPY N/A 02/02/2021    Procedure: ESOPHAGOGASTRODUODENOSCOPY (EGD);  Surgeon: Annamaria Mendoza MD;  Location: [facility] ENDO (2ND FLR);  Service: Endoscopy;  Laterality: N/A;  2nd floor gastroparesis  3 days full liquid diet and 1 day clears  covid test 1/30 primary care, instructions sent to [facility]    ESOPHAGOGASTRODUODENOSCOPY N/A 07/01/2022    Procedure: ESOPHAGOGASTRODUODENOSCOPY (EGD);  Surgeon: Annamaria Mendoza MD;  Location: [facility] ([address] [place] [address]);  Service:  Endoscopy;  Laterality: N/A;  2nd floor-gastroparesis  full liquid diet x3 days, clear liquid diet x1 day prior to procedure  fully vaccinated, instructions sent to [facility]  6/29-pt confirmed new arrival time-Kpvt    EXCISION, LESION, STOMACH, LAPAROSCOPIC N/A 03/23/2023    Procedure: XI ROBOTIC EXCISION, LESION, STOMACH;  Surgeon: Katherine Segura MD;  [facility];  Service: General;  Laterality: N/A;    EXCISIONAL BIOPSY, LYMPH NODE Right 05/26/2023    Procedure: EXCISIONAL BIOPSY, LYMPH NODE, INGUINAL;  Surgeon: Katherine Segura MD;  [facility];  Service: General;  Laterality: Right;    HYSTERECTOMY  1990    FRANDY (AUB, Fibroids), ovaries remain    REPAIR, HERNIA, UMBILICAL N/A 03/23/2023    Procedure: REPAIR, HERNIA, UMBILICAL;  Surgeon: Katherine Segura MD;  Location: [facility] OR [address] [place] [address];  Service: General;  Laterality: N/A;    RIGHT HEART CATHETERIZATION Right 01/05/2021    Procedure: INSERTION, CATHETER, RIGHT HEART;  Surgeon: Aureliano Sy MD;  Location: [facility] CATH LAB;  Service: Cardiology;  Laterality: Right;    RIGHT HEART CATHETERIZATION Right 03/16/2023    Procedure: INSERTION, CATHETER, RIGHT HEART;  Surgeon: Aureliano Sy MD;  Location: [facility] CATH LAB;  Service: Cardiology;  Laterality: Right;    RIGHT HEART CATHETERIZATION Right 11/22/2024    Procedure: INSERTION, CATHETER, RIGHT HEART;  Surgeon: Tamanna Martins MD;  Location: [facility] CATH LAB;  Service: Cardiology;  Laterality: Right;    ROBOT-ASSISTED LAPAROSCOPIC REPAIR OF INGUINAL HERNIA USING DA VERNELL XI Right 05/26/2023    Procedure: XI ROBOTIC REPAIR, HERNIA, INGUINAL, FEMORAL;  Surgeon: Katherine Segura MD;  [facility];  Service: General;  Laterality: Right;    ROBOT-ASSISTED REPAIR OF HIATAL HERNIA USING DA VERNELL XI N/A 03/23/2023    Procedure: XI ROBOTIC REPAIR, HERNIA, HIATAL;  Surgeon: Katherine Segura MD;  Location: [facility] OR [address] [place] [address];   Service: General;  Laterality: N/A;    VARICOSE VEIN SURGERY      XI ROBOTIC GASTROPEXY N/A 2023    Procedure: XI ROBOTIC GASTROPEXY;  Surgeon: Katherine Segura MD;  [facility];  Service: General;  Laterality: N/A;    XI ROBOTIC PYLOROMYOTOMY N/A 2023    Procedure: XI ROBOTIC PYLOROMYOTOMY;  Surgeon: Katherine Segura MD;  Location: [facility] OR [facility] FLR;  Service: General;  Laterality: N/A;        Objective:     Vitals  Wt Readings from Last 1 Encounters:   25 64.4 kg (141 lb 15.6 oz)     Temp Readings from Last 1 Encounters:   25 97.9 °F (36.6 °C) (Oral)     BP Readings from Last 1 Encounters:   25 (!) 121/56     Pulse Readings from Last 1 Encounters:   25 73       Dermatological Exam    Skin:  Skin of both feet thickened (scleroderma), stiff, with extensive ulcerations of both feet down to fat with slowly improving granulation; chronic low level infection, xerosis  Tender hypergranular tissue at dorsal 2nd toe PIPJ less prominent s/p debridement              Nails:  10 nail(s) thickened, and 10 nail(s) discolored    Vascular Exam    Arteries:  Posterior tibial artery palpable on right  Dorsalis pedis artery palpable on right  Posterior tibial artery palpable on left  Dorsalis pedis artery palpable on left    Veins:  Superficial veins unremarkable on right  Superficial veins unremarkable on left    Swellin+ nonpitting on right  1+ nonpitting on left    Neurological Exam    Pickett touch test:  6/6 sites sensed, light touch intact     Musculoskeletal Exam    Footwear:  Casual on right  Casual on left    Gait Exam:   Ambulatory Status: Ambulatory  Gait: Normal  Assistive Devices: None    Foot Progression Angle:  Normal on right  Normal on left    Right Lower Extremity Additional Findings:  Right foot and ankle function, strength, and range of motion unremarkable except as noted above.     Left Lower Extremity Additional Findings:  2nd hammertoe  Left  foot and ankle function, strength, and range of motion unremarkable except as noted above.    Imaging and Other Tests    Imagin/26/25 left foot X rays: 2nd hammertoe deformity, centered at PIPJ    Independently reviewed and interpreted imaging, findings are as follows: N/A     Assessment:     Encounter Diagnoses   Name Primary?    Cellulitis of lower extremity, unspecified laterality Yes    Ulcers of both lower extremities with fat layer exposed          Plan:     I counseled the patient on her conditions, their implications and medical management.    Neuropathy: chronic stable  -Failed amitriptyline.   -Continue duloxetine 120 mg/day.      Bilateral ulcers, cellulitis, swelling: chronic stable    -Dressings changed. HH dressing changes.   -Protected WB in surgical shoes.      -Antibiotic plan per ID. New culture obtained.     Scleroderma: chronic stable  -Continue PO tadalafil 40 mg/day.      I spent a total of 40 minutes on the day of the visit.  This includes face to face time and non-face to face time preparing to see the patient (eg, review of tests), obtaining and/or reviewing separately obtained history, documenting clinical information in the electronic or other health record, independently interpreting results and communicating results to the patient/family/caregiver, or care coordinator.     Return to clinic in 1-2 weeks, call sooner PRN.

## 2025-04-05 LAB
GRAM STN SPEC: NORMAL

## 2025-04-07 ENCOUNTER — LAB REQUISITION (OUTPATIENT)
Dept: LAB | Facility: HOSPITAL | Age: 75
End: 2025-04-07
Payer: MEDICARE

## 2025-04-07 DIAGNOSIS — B96.5 PSEUDOMONAS (AERUGINOSA) (MALLEI) (PSEUDOMALLEI) AS THE CAUSE OF DISEASES CLASSIFIED ELSEWHERE: ICD-10-CM

## 2025-04-07 DIAGNOSIS — M34.9 SCLERODERMA WITH PULMONARY INVOLVEMENT: Primary | ICD-10-CM

## 2025-04-07 DIAGNOSIS — Z79.899 HIGH RISK MEDICATION USE: ICD-10-CM

## 2025-04-07 LAB
ALBUMIN SERPL BCP-MCNC: 3.2 G/DL (ref 3.5–5.2)
ALP SERPL-CCNC: 111 UNIT/L (ref 40–150)
ALT SERPL W/O P-5'-P-CCNC: 10 UNIT/L (ref 10–44)
ANION GAP (OHS): 7 MMOL/L (ref 8–16)
AST SERPL-CCNC: 21 UNIT/L (ref 11–45)
BILIRUB SERPL-MCNC: 0.4 MG/DL (ref 0.1–1)
BUN SERPL-MCNC: 25 MG/DL (ref 8–23)
CALCIUM SERPL-MCNC: 8.7 MG/DL (ref 8.7–10.5)
CHLORIDE SERPL-SCNC: 105 MMOL/L (ref 95–110)
CO2 SERPL-SCNC: 25 MMOL/L (ref 23–29)
CREAT SERPL-MCNC: 0.6 MG/DL (ref 0.5–1.4)
CRP SERPL-MCNC: 41.2 MG/L
GFR SERPLBLD CREATININE-BSD FMLA CKD-EPI: >60 ML/MIN/1.73/M2
GLUCOSE SERPL-MCNC: 75 MG/DL (ref 70–110)
HOLD SPECIMEN: NORMAL
POTASSIUM SERPL-SCNC: 4.3 MMOL/L (ref 3.5–5.1)
PROT SERPL-MCNC: 8.4 GM/DL (ref 6–8.4)
SODIUM SERPL-SCNC: 137 MMOL/L (ref 136–145)

## 2025-04-07 PROCEDURE — 86140 C-REACTIVE PROTEIN: CPT | Mod: TXP | Performed by: STUDENT IN AN ORGANIZED HEALTH CARE EDUCATION/TRAINING PROGRAM

## 2025-04-07 PROCEDURE — 80053 COMPREHEN METABOLIC PANEL: CPT | Mod: TXP | Performed by: STUDENT IN AN ORGANIZED HEALTH CARE EDUCATION/TRAINING PROGRAM

## 2025-04-08 DIAGNOSIS — G60.9 IDIOPATHIC NEUROPATHY: ICD-10-CM

## 2025-04-08 DIAGNOSIS — M34.89 CUTANEOUS SCLERODERMA: ICD-10-CM

## 2025-04-08 DIAGNOSIS — M79.18 MYOFASCIAL PAIN: ICD-10-CM

## 2025-04-09 RX ORDER — NABUMETONE 750 MG/1
750 TABLET, FILM COATED ORAL DAILY PRN
Qty: 30 TABLET | Refills: 3 | Status: SHIPPED | OUTPATIENT
Start: 2025-04-09

## 2025-04-10 ENCOUNTER — HOSPITAL ENCOUNTER (OUTPATIENT)
Dept: INTERVENTIONAL RADIOLOGY/VASCULAR | Facility: HOSPITAL | Age: 75
Discharge: HOME | End: 2025-04-10
Payer: MEDICARE

## 2025-04-10 VITALS
HEART RATE: 75 BPM | OXYGEN SATURATION: 97 % | TEMPERATURE: 98 F | RESPIRATION RATE: 20 BRPM | DIASTOLIC BLOOD PRESSURE: 71 MMHG | SYSTOLIC BLOOD PRESSURE: 160 MMHG

## 2025-04-10 DIAGNOSIS — L03.119 CELLULITIS OF LOWER EXTREMITY, UNSPECIFIED LATERALITY: Primary | ICD-10-CM

## 2025-04-10 DIAGNOSIS — L03.116 CELLULITIS OF LEFT LOWER EXTREMITY: ICD-10-CM

## 2025-04-10 DIAGNOSIS — L03.116 CELLULITIS OF LEFT LOWER EXTREMITY: Primary | ICD-10-CM

## 2025-04-10 NOTE — PROGRESS NOTES
Covering for Dr. Cruz, notified by infusion company that pt completed IV abx. Spoke with IR, she is undergoing line removal today.

## 2025-04-13 ENCOUNTER — RESULTS FOLLOW-UP (OUTPATIENT)
Dept: PODIATRY | Facility: CLINIC | Age: 75
End: 2025-04-13

## 2025-04-20 DIAGNOSIS — M79.18 MYOFASCIAL PAIN: ICD-10-CM

## 2025-04-20 DIAGNOSIS — G89.4 CHRONIC PAIN DISORDER: ICD-10-CM

## 2025-04-20 DIAGNOSIS — E55.9 VITAMIN D DEFICIENCY: ICD-10-CM

## 2025-04-20 RX ORDER — ERGOCALCIFEROL 1.25 MG/1
50000 CAPSULE ORAL
Qty: 12 CAPSULE | Refills: 1 | Status: CANCELLED | OUTPATIENT
Start: 2025-04-20

## 2025-04-21 ENCOUNTER — LAB VISIT (OUTPATIENT)
Dept: LAB | Facility: HOSPITAL | Age: 75
End: 2025-04-21
Attending: INTERNAL MEDICINE
Payer: MEDICARE

## 2025-04-21 DIAGNOSIS — R79.9 ABNORMAL FINDING OF BLOOD CHEMISTRY, UNSPECIFIED: ICD-10-CM

## 2025-04-21 DIAGNOSIS — Z79.899 IMMUNOSUPPRESSION DUE TO DRUG THERAPY: ICD-10-CM

## 2025-04-21 DIAGNOSIS — I10 ESSENTIAL HYPERTENSION: ICD-10-CM

## 2025-04-21 DIAGNOSIS — D84.821 IMMUNOSUPPRESSION DUE TO DRUG THERAPY: ICD-10-CM

## 2025-04-21 LAB
ABSOLUTE EOSINOPHIL (OHS): 0.14 K/UL
ABSOLUTE MONOCYTE (OHS): 0.53 K/UL (ref 0.3–1)
ABSOLUTE NEUTROPHIL COUNT (OHS): 3.5 K/UL (ref 1.8–7.7)
ALBUMIN SERPL BCP-MCNC: 3 G/DL (ref 3.5–5.2)
ALP SERPL-CCNC: 93 UNIT/L (ref 40–150)
ALT SERPL W/O P-5'-P-CCNC: 11 UNIT/L (ref 10–44)
ANION GAP (OHS): 11 MMOL/L (ref 8–16)
AST SERPL-CCNC: 17 UNIT/L (ref 11–45)
BASOPHILS # BLD AUTO: 0.02 K/UL
BASOPHILS NFR BLD AUTO: 0.4 %
BILIRUB SERPL-MCNC: 0.3 MG/DL (ref 0.1–1)
BUN SERPL-MCNC: 19 MG/DL (ref 8–23)
CALCIUM SERPL-MCNC: 8.7 MG/DL (ref 8.7–10.5)
CHLORIDE SERPL-SCNC: 107 MMOL/L (ref 95–110)
CO2 SERPL-SCNC: 23 MMOL/L (ref 23–29)
CREAT SERPL-MCNC: 0.7 MG/DL (ref 0.5–1.4)
ERYTHROCYTE [DISTWIDTH] IN BLOOD BY AUTOMATED COUNT: 14.6 % (ref 11.5–14.5)
FERRITIN SERPL-MCNC: 48 NG/ML (ref 20–300)
GFR SERPLBLD CREATININE-BSD FMLA CKD-EPI: >60 ML/MIN/1.73/M2
GLUCOSE SERPL-MCNC: 87 MG/DL (ref 70–110)
HCT VFR BLD AUTO: 35.1 % (ref 37–48.5)
HGB BLD-MCNC: 11.4 GM/DL (ref 12–16)
IMM GRANULOCYTES # BLD AUTO: 0.02 K/UL (ref 0–0.04)
IMM GRANULOCYTES NFR BLD AUTO: 0.4 % (ref 0–0.5)
LYMPHOCYTES # BLD AUTO: 1.29 K/UL (ref 1–4.8)
MCH RBC QN AUTO: 31 PG (ref 27–31)
MCHC RBC AUTO-ENTMCNC: 32.5 G/DL (ref 32–36)
MCV RBC AUTO: 95 FL (ref 82–98)
NUCLEATED RBC (/100WBC) (OHS): 0 /100 WBC
PLATELET # BLD AUTO: 192 K/UL (ref 150–450)
PMV BLD AUTO: 12.2 FL (ref 9.2–12.9)
POTASSIUM SERPL-SCNC: 3.9 MMOL/L (ref 3.5–5.1)
PROT SERPL-MCNC: 8 GM/DL (ref 6–8.4)
RBC # BLD AUTO: 3.68 M/UL (ref 4–5.4)
RELATIVE EOSINOPHIL (OHS): 2.5 %
RELATIVE LYMPHOCYTE (OHS): 23.5 % (ref 18–48)
RELATIVE MONOCYTE (OHS): 9.6 % (ref 4–15)
RELATIVE NEUTROPHIL (OHS): 63.6 % (ref 38–73)
SODIUM SERPL-SCNC: 141 MMOL/L (ref 136–145)
WBC # BLD AUTO: 5.5 K/UL (ref 3.9–12.7)

## 2025-04-21 PROCEDURE — 82728 ASSAY OF FERRITIN: CPT | Mod: TXP

## 2025-04-21 PROCEDURE — 85025 COMPLETE CBC W/AUTO DIFF WBC: CPT | Mod: TXP

## 2025-04-21 PROCEDURE — 80053 COMPREHEN METABOLIC PANEL: CPT | Mod: TXP

## 2025-04-21 PROCEDURE — 36415 COLL VENOUS BLD VENIPUNCTURE: CPT | Mod: PO,TXP

## 2025-04-21 NOTE — TELEPHONE ENCOUNTER
Care Due:                  Date            Visit Type   Department     Provider  --------------------------------------------------------------------------------                                EP -                              PRIMARY      MET INTERNAL  Last Visit: 10-      CARE ([facility])   MEDICINE       Alypauline Rand                              EP -                              PRIMARY      [facility] INTERNAL  Next Visit: 04-      CARE ([facility])   MEDICINE       Aly A Green                                                            Last  Test          Frequency    Reason                     Performed    Due Date  --------------------------------------------------------------------------------    Vitamin D...  12 months..  ergocalciferol...........  10-   10-    [employer] Embedded Care Due Messages. Reference number: [id number].   4/20/2025 8:35:10 PM CDT

## 2025-04-22 RX ORDER — ACETAMINOPHEN AND CODEINE PHOSPHATE 300; 60 MG/1; MG/1
1 TABLET ORAL NIGHTLY
Qty: 30 TABLET | Refills: 0 | Status: SHIPPED | OUTPATIENT
Start: 2025-04-22 | End: 2025-05-22

## 2025-04-22 RX ORDER — TIZANIDINE 4 MG/1
8 TABLET ORAL 2 TIMES DAILY PRN
Qty: 60 TABLET | Refills: 3 | Status: SHIPPED | OUTPATIENT
Start: 2025-04-22

## 2025-04-25 ENCOUNTER — OFFICE VISIT (OUTPATIENT)
Dept: PODIATRY | Facility: CLINIC | Age: 75
End: 2025-04-25
Payer: MEDICARE

## 2025-04-25 VITALS
HEIGHT: 65 IN | SYSTOLIC BLOOD PRESSURE: 117 MMHG | BODY MASS INDEX: 23.63 KG/M2 | DIASTOLIC BLOOD PRESSURE: 55 MMHG | HEART RATE: 84 BPM

## 2025-04-25 DIAGNOSIS — L97.922 ULCERS OF BOTH LOWER EXTREMITIES WITH FAT LAYER EXPOSED: ICD-10-CM

## 2025-04-25 DIAGNOSIS — L03.119 CELLULITIS OF LOWER EXTREMITY, UNSPECIFIED LATERALITY: Primary | ICD-10-CM

## 2025-04-25 DIAGNOSIS — L97.912 ULCERS OF BOTH LOWER EXTREMITIES WITH FAT LAYER EXPOSED: ICD-10-CM

## 2025-04-25 PROCEDURE — 99999 PR PBB SHADOW E&M-EST. PATIENT-LVL IV: CPT | Mod: PBBFAC,,, | Performed by: STUDENT IN AN ORGANIZED HEALTH CARE EDUCATION/TRAINING PROGRAM

## 2025-04-25 PROCEDURE — 99214 OFFICE O/P EST MOD 30 MIN: CPT | Mod: PBBFAC | Performed by: STUDENT IN AN ORGANIZED HEALTH CARE EDUCATION/TRAINING PROGRAM

## 2025-04-25 NOTE — PROGRESS NOTES
Subjective:     Patient    Yamel Shah is a 74 y.o. female.    Problem    2024: Previously seen by multiple wound care providers without significant improvement in wounds, has bounced around a lot. Has bilateral foot wounds related to scleroderma and immunosuppressant medications. Had improvement in granulation with regular wound care, oral and topical antibiotics, one debridement. Attempted topical vitamin E but caused burning.     01/03/25: Returns for bilateral foot wound care. No new concerns. Had pain flare on left foot, still sensitive.     01/08/25: Returns for bilateral foot wound care. No new concerns.     01/16/25: Returns for bilateral foot wound care. Awaiting appointment with ID 02/14. No new concerns.     01/24/25: Returns for bilateral foot wound care. Pain controlled on current medications. No new concerns.     01/31/25: Returns for bilateral foot wound care. Not tolerating amitriptyline well; still with nerve pain, tingling, cramping, burning despite duloxetine, tolerates duloxetine well.     02/07/25: Returns for bilateral foot wound care. Increased duloxetine at last visit for BLE nerve pain with significant improvement in pain without side effects. ID appointment rescheduled to 02/24.     02/14/25: Returns for bilateral foot wound care. Wants to restart PO antibiotics until she sees ID, thinks she was progressing better at that time.     02/21/25: Returns for bilateral foot wound care. Wounds seem somewhat better on PO antibiotics, ID appointment next week.     02/25/25: Returns for bilateral foot wound care. Completed recent PO antibiotics, recent cultures + for Pseudomonas resistant to PO antibiotics; has first ID appointment tomorrow.     03/07/25: Returns for bilateral foot wound care. Getting OPAT per ID soon. Pain OK.     03/12/25: Returns for bilateral foot wound care. Will start antibiotics within next week. Pain OK except severe at left 2nd toe dorsally. Wants  debridement of left 2nd toe today.     03/19/25: Returns for bilateral foot wound care. On IV antibiotics. No new concerns.     03/27/25: Returns for bilateral foot wound care. Still on IV antibiotics. No new concerns.     04/04/25: Returns for bilateral foot wound care. Stopped IV antibiotics but still has the line in. Going on vacation for 2 weeks soon.     04/25/25: Returns for bilateral foot wound care. Newer cultures with polymicrobial growth, ID not concerned. Does not feel wounds significantly improved with IV antibiotics.     History    History obtained from patient and review of medical records.     Past Medical History:   Diagnosis Date    Abnormal Pap smear     Acid reflux     Allergy     Arthritis     Encounter for blood transfusion     GERD (gastroesophageal reflux disease)     Hiatal hernia 03/24/2023    History of migraine headaches     Hx of colonic polyp     Hypertension     Idiopathic neuropathy 07/20/2012    ILD (interstitial lung disease) 11/06/2013    Iron deficiency anemia 03/18/2014    MRSA carrier     Osteopenia     Pneumonia     Pulmonary fibrosis     Pulmonary hypertension     Raynaud's disease     Scleroderma, diffuse     Sjogren's syndrome     Vitamin D deficiency 11/14/2013       Past Surgical History:   Procedure Laterality Date    24 HOUR IMPEDANCE PH MONITORING OF ESOPHAGUS IN PATIENT NOT TAKING ACID REDUCING MEDICATIONS N/A 03/04/2020    Procedure: IMPEDANCE PH STUDY, ESOPHAGEAL, 24 HOUR, IN PATIENT NOT TAKING ACID REDUCING MEDICATION;  Surgeon: Annamaria Mendoza MD;  Location: [address]);  Service: Endoscopy;  Laterality: N/A;  OFF PPI/H2 Blocker   Motility Studies   Hold Narcotics x 1 days   Hold TCA x 1 days  2/26 - LVM attempting to confirm appt  2/27 - Confirmed appt    ANORECTAL MANOMETRY N/A 05/10/2021    Procedure: MANOMETRY, ANORECTAL with balloon expulsion test;  Surgeon: Annamaria Mendoza MD;  [facility] ([address] [place] [address]);  Service: Endoscopy;  Laterality: N/A;   order combined  covid test 5/7 [facility], instructions emailed-[facility]    BREAST BIOPSY      Left, benign    BRONCHOSCOPY N/A 10/2/2023    Procedure: bronch;  Surgeon: Roberta Leigh DO;  Location: [facility] OR 2ND FLR;  Service: Endoscopy;  Laterality: N/A;    BRONCHOSCOPY N/A 9/16/2024    Procedure: Flexible bronchoscopy (BAL only);  Surgeon: Roberta Leigh DO;  Location: [facility] OR 2ND FLR;  Service: Endoscopy;  Laterality: N/A;    CERVICAL CONIZATION   W/ LASER  1970    COLONOSCOPY      COLONOSCOPY N/A 03/29/2019    Procedure: COLONOSCOPY;  Surgeon: Annamaria Mendoza MD;  Location: [facility] ENDO (2ND FLR);  Service: Endoscopy;  Laterality: N/A;    COLONOSCOPY N/A 05/10/2021    Procedure: COLONOSCOPY;  Surgeon: Annamaria Mendoza MD;  Location: [facility] ENDO (4TH FLR);  Service: Endoscopy;  Laterality: N/A;  2nd floor-previous scopes done on 2nd floor, gastroparesis  full liquid diet x2 days, clear liquid x1 day prior to procedure  covid test 5/7 [facility], instructions emailed-[facility]  5/6 pt confirmed appt-[facility]    DILATION AND CURETTAGE OF UTERUS      ESOPHAGEAL MANOMETRY WITH MEASUREMENT OF IMPEDANCE N/A 03/04/2020    Procedure: MANOMETRY, ESOPHAGUS, WITH IMPEDANCE MEASUREMENT;  Surgeon: Annamaria Mendoza MD;  Location: [facility] (4TH FLR);  Service: Endoscopy;  Laterality: N/A;  OFF PPI/H2 Blocker   Motility Studies   Hold Narcotics x 1 days   Hold TCA x 1 days    ESOPHAGOGASTRODUODENOSCOPY      ESOPHAGOGASTRODUODENOSCOPY N/A 03/29/2019    Procedure: EGD (ESOPHAGOGASTRODUODENOSCOPY);  Surgeon: Annamaria Mendoza MD;  Location: [facility] ENDO (2ND FLR);  Service: Endoscopy;  Laterality: N/A;  pulmonary htn    ESOPHAGOGASTRODUODENOSCOPY N/A 02/02/2021    Procedure: ESOPHAGOGASTRODUODENOSCOPY (EGD);  Surgeon: Annamaria Mendoza MD;  Location: [facility] ENDO (2ND FLR);  Service: Endoscopy;  Laterality: N/A;  2nd floor gastroparesis  3 days full liquid diet and 1 day clears  covid test 1/30 primary care, instructions sent to [facility]     ESOPHAGOGASTRODUODENOSCOPY N/A 07/01/2022    Procedure: ESOPHAGOGASTRODUODENOSCOPY (EGD);  Surgeon: Annamaria Mendoza MD;  Location: [facility] (2ND FLR);  Service: Endoscopy;  Laterality: N/A;  2nd floor-gastroparesis  full liquid diet x3 days, clear liquid diet x1 day prior to procedure  fully vaccinated, instructions sent to [facility]  6/29-pt confirmed new arrival time-K[facility]    EXCISION, LESION, STOMACH, LAPAROSCOPIC N/A 03/23/2023    Procedure: XI ROBOTIC EXCISION, LESION, STOMACH;  Surgeon: Katherine Segura MD;  [facility];  Service: General;  Laterality: N/A;    EXCISIONAL BIOPSY, LYMPH NODE Right 05/26/2023    Procedure: EXCISIONAL BIOPSY, LYMPH NODE, INGUINAL;  Surgeon: Katherine Segura MD;  Location: [facility] OR [address] [place] [address];  Service: General;  Laterality: Right;    HYSTERECTOMY  1990    FRANDY (AUB, Fibroids), ovaries remain    REPAIR, HERNIA, UMBILICAL N/A 03/23/2023    Procedure: REPAIR, HERNIA, UMBILICAL;  Surgeon: Katherine Segura MD;  [facility];  Service: General;  Laterality: N/A;    RIGHT HEART CATHETERIZATION Right 01/05/2021    Procedure: INSERTION, CATHETER, RIGHT HEART;  Surgeon: Aureliano Sy MD;  Location: [facility] CATH LAB;  Service: Cardiology;  Laterality: Right;    RIGHT HEART CATHETERIZATION Right 03/16/2023    Procedure: INSERTION, CATHETER, RIGHT HEART;  Surgeon: Aureliano Sy MD;  Location: [facility] CATH LAB;  Service: Cardiology;  Laterality: Right;    RIGHT HEART CATHETERIZATION Right 11/22/2024    Procedure: INSERTION, CATHETER, RIGHT HEART;  Surgeon: Tamanna Martins MD;  Location: [facility] CATH LAB;  Service: Cardiology;  Laterality: Right;    ROBOT-ASSISTED LAPAROSCOPIC REPAIR OF INGUINAL HERNIA USING DA VERNELL XI Right 05/26/2023    Procedure: XI ROBOTIC REPAIR, HERNIA, INGUINAL, FEMORAL;  Surgeon: Katherine Segura MD;  Location: [facility] OR [address] [place] [address];  Service: General;  Laterality: Right;    ROBOT-ASSISTED REPAIR OF  HIATAL HERNIA USING DA VERNELL XI N/A 2023    Procedure: XI ROBOTIC REPAIR, HERNIA, HIATAL;  Surgeon: Katherine Segura MD;  Location: [facility] OR 2ND FLR;  Service: General;  Laterality: N/A;    VARICOSE VEIN SURGERY      XI ROBOTIC GASTROPEXY N/A 2023    Procedure: XI ROBOTIC GASTROPEXY;  Surgeon: Katherine Segura MD;  [facility];  Service: General;  Laterality: N/A;    XI ROBOTIC PYLOROMYOTOMY N/A 2023    Procedure: XI ROBOTIC PYLOROMYOTOMY;  Surgeon: Katherine Segura MD;  [facility];  Service: General;  Laterality: N/A;        Objective:     Vitals  Wt Readings from Last 1 Encounters:   25 64.4 kg (141 lb 15.6 oz)     Temp Readings from Last 1 Encounters:   04/10/25 98.2 °F (36.8 °C) (Oral)     BP Readings from Last 1 Encounters:   25 (!) 117/55     Pulse Readings from Last 1 Encounters:   25 84       Dermatological Exam    Skin:  Skin of both feet thickened (scleroderma), stiff, with extensive ulcerations of both feet down to fat with slowly improving granulation; chronic low level infection, xerosis  Tender hypergranular tissue at dorsal 2nd toe PIPJ less prominent s/p debridement                Nails:  10 nail(s) thickened, and 10 nail(s) discolored    Vascular Exam    Arteries:  Posterior tibial artery palpable on right  Dorsalis pedis artery palpable on right  Posterior tibial artery palpable on left  Dorsalis pedis artery palpable on left    Veins:  Superficial veins unremarkable on right  Superficial veins unremarkable on left    Swellin+ nonpitting on right  1+ nonpitting on left    Neurological Exam    [place] touch test:  6/6 sites sensed, light touch intact     Musculoskeletal Exam    Footwear:  Casual on right  Casual on left    Gait Exam:   Ambulatory Status: Ambulatory  Gait: Normal  Assistive Devices: None    Foot Progression Angle:  Normal on right  Normal on left    Right Lower Extremity Additional  Findings:  Right foot and ankle function, strength, and range of motion unremarkable except as noted above.     Left Lower Extremity Additional Findings:  2nd hammertoe  Left foot and ankle function, strength, and range of motion unremarkable except as noted above.    Imaging and Other Tests    Imagin/26/25 left foot X rays: 2nd hammertoe deformity, centered at PIPJ    Independently reviewed and interpreted imaging, findings are as follows: N/A     Assessment:     Encounter Diagnoses   Name Primary?    Cellulitis of lower extremity, unspecified laterality Yes    Ulcers of both lower extremities with fat layer exposed            Plan:     I counseled the patient on her conditions, their implications and medical management.    Neuropathy: chronic stable  -Failed amitriptyline.   -Continue duloxetine 120 mg/day.      Bilateral ulcers, cellulitis, swelling: chronic stable    -Dressings changed. HH dressing changes.   -Protected WB in surgical shoes.      -Ordering compounded topical antibiotics from [facility], to be used during dressing changes.     Scleroderma: chronic stable  -Continue PO tadalafil 40 mg/day.      I spent a total of 40 minutes on the day of the visit.  This includes face to face time and non-face to face time preparing to see the patient (eg, review of tests), obtaining and/or reviewing separately obtained history, documenting clinical information in the electronic or other health record, independently interpreting results and communicating results to the patient/family/caregiver, or care coordinator.     Return to clinic in 1-2 weeks, call sooner PRN.

## 2025-04-28 ENCOUNTER — OFFICE VISIT (OUTPATIENT)
Dept: INTERNAL MEDICINE | Facility: CLINIC | Age: 75
End: 2025-04-28
Payer: MEDICARE

## 2025-04-28 ENCOUNTER — PATIENT MESSAGE (OUTPATIENT)
Dept: TRANSPLANT | Facility: CLINIC | Age: 75
End: 2025-04-28
Payer: MEDICARE

## 2025-04-28 VITALS
BODY MASS INDEX: 23.49 KG/M2 | HEIGHT: 65 IN | SYSTOLIC BLOOD PRESSURE: 124 MMHG | DIASTOLIC BLOOD PRESSURE: 56 MMHG | OXYGEN SATURATION: 95 % | HEART RATE: 97 BPM | RESPIRATION RATE: 16 BRPM | WEIGHT: 141 LBS | TEMPERATURE: 97 F

## 2025-04-28 DIAGNOSIS — D64.9 ANEMIA, UNSPECIFIED TYPE: ICD-10-CM

## 2025-04-28 DIAGNOSIS — L97.529 SKIN ULCERS OF BOTH FEET: ICD-10-CM

## 2025-04-28 DIAGNOSIS — K41.90 FEMORAL HERNIA OF RIGHT SIDE: ICD-10-CM

## 2025-04-28 DIAGNOSIS — E55.9 VITAMIN D DEFICIENCY: ICD-10-CM

## 2025-04-28 DIAGNOSIS — L97.519 SKIN ULCERS OF BOTH FEET: ICD-10-CM

## 2025-04-28 DIAGNOSIS — D52.0 DIETARY FOLATE DEFICIENCY ANEMIA: ICD-10-CM

## 2025-04-28 DIAGNOSIS — G89.4 CHRONIC PAIN DISORDER: ICD-10-CM

## 2025-04-28 DIAGNOSIS — I10 ESSENTIAL HYPERTENSION: Primary | ICD-10-CM

## 2025-04-28 DIAGNOSIS — M34.9 SCLERODERMA: ICD-10-CM

## 2025-04-28 PROCEDURE — 99215 OFFICE O/P EST HI 40 MIN: CPT | Mod: PBBFAC,PO | Performed by: INTERNAL MEDICINE

## 2025-04-28 PROCEDURE — 99215 OFFICE O/P EST HI 40 MIN: CPT | Mod: S$PBB,,, | Performed by: INTERNAL MEDICINE

## 2025-04-28 PROCEDURE — 99999 PR PBB SHADOW E&M-EST. PATIENT-LVL V: CPT | Mod: PBBFAC,,, | Performed by: INTERNAL MEDICINE

## 2025-04-28 RX ORDER — ERGOCALCIFEROL 1.25 MG/1
50000 CAPSULE ORAL
Qty: 12 CAPSULE | Refills: 3 | Status: SHIPPED | OUTPATIENT
Start: 2025-04-28

## 2025-04-28 NOTE — PROGRESS NOTES
Subjective:       Patient ID: Yamel Shah is a 74 y.o. female.    Chief Complaint: Hypertension (6 mos wlabs)    History of Present Illness    CHIEF COMPLAINT:  Yamel presents today for follow up of multiple chronic conditions.    WOUND CARE:  She reports foot wounds are not healing well despite antibiotic therapy, which she attributes to scleroderma affecting the skin. Home health nurse visits twice weekly for wound care. She walks slowly with an uneven gait due to bandages affecting her balance.    MUSCULOSKELETAL:  She experiences occasional back pain only with overexertion during housecleaning activities. The pain improves with rest, specifically when lying down and taking a nap. She denies daily back pain or joint pain.    RESPIRATORY:  She experiences wheezing predominantly during winter months with daily phlegm production that is most pronounced in the morning, requiring expectoration.    POSSIBLE HERNIA:  She reports a growing mass in the groin area that is more noticeable when standing or during strenuous activities, and less visible when lying down. The area is tender to touch when pressure is applied.    URINARY INCONTINENCE:  She reports some improvement with pelvic floor physical therapy, though notes it requires ongoing maintenance. She wears sanitary pads for protection against urinary leakage.    PAIN AND SLEEP:  She experiences increased pain in the evening, typically starting around 5:00 or 6:00 PM, which prompts her to take her prescribed medications. She reports sleeping well with medications.    MEDICATIONS:  She takes prescription vitamin D 50,000 units weekly, OTC vitamin D supplement, multivitamin, Xanax, Tizanidine, and Tylenol #4 for headache management.      ROS:  Constitutional: -fevers, +nightime pain  ENT: +dry mouth  Respiratory: -cough, +wheezing, +productive cough  Genitourinary: +urinary incontinence  Musculoskeletal: -joint pain, +back pain, +pain with  movement  Neurological: +balance issues              Physical Exam  Vitals and nursing note reviewed.   Constitutional:       General: She is not in acute distress.     Appearance: Normal appearance. She is well-developed.   HENT:      Head: Normocephalic and atraumatic.   Eyes:      General: No scleral icterus.     Extraocular Movements: Extraocular movements intact.      Conjunctiva/sclera: Conjunctivae normal.   Neck:      Thyroid: No thyromegaly.      Vascular: No JVD.   Cardiovascular:      Rate and Rhythm: Normal rate and regular rhythm.      Heart sounds: Normal heart sounds. No murmur heard.     No friction rub. No gallop.      Comments: Occasional ectopic beats are noted.  Pulmonary:      Effort: Pulmonary effort is normal. No respiratory distress.      Breath sounds: Normal breath sounds. No wheezing or rales.   Abdominal:      General: Bowel sounds are normal.      Palpations: Abdomen is soft. There is no mass.      Tenderness: There is no abdominal tenderness.      Hernia: A hernia (slightly tender right femoral hernia noted.) is present.   Musculoskeletal:         General: No tenderness. Normal range of motion.      Cervical back: Normal range of motion and neck supple.      Right lower leg: No edema.      Left lower leg: No edema.      Comments: Bandages are in place on both ankles and feet.   Lymphadenopathy:      Cervical: No cervical adenopathy.   Skin:     General: Skin is warm and dry.      Findings: No rash.   Neurological:      Mental Status: She is alert and oriented to person, place, and time.   Psychiatric:         Mood and Affect: Mood normal.         Behavior: Behavior normal.       Images of both legs and feet from Podiatry from 4/4/25 were viewed.    Lab Visit on 04/21/2025   Component Date Value Ref Range Status    Sodium 04/21/2025 141  136 - 145 mmol/L Final    Potassium 04/21/2025 3.9  3.5 - 5.1 mmol/L Final    Chloride 04/21/2025 107  95 - 110 mmol/L Final    CO2 04/21/2025 23  23 -  29 mmol/L Final    Glucose 04/21/2025 87  70 - 110 mg/dL Final    BUN 04/21/2025 19  8 - 23 mg/dL Final    Creatinine 04/21/2025 0.7  0.5 - 1.4 mg/dL Final    Calcium 04/21/2025 8.7  8.7 - 10.5 mg/dL Final    Protein Total 04/21/2025 8.0  6.0 - 8.4 gm/dL Final    Albumin 04/21/2025 3.0 (L)  3.5 - 5.2 g/dL Final    Bilirubin Total 04/21/2025 0.3  0.1 - 1.0 mg/dL Final    For infants and newborns, interpretation of results should be based   on gestational age, weight and in agreement with clinical   observations.    Premature Infant recommended reference ranges:   0-24 hours:  <8.0 mg/dL   24-48 hours: <12.0 mg/dL   3-5 days:    <15.0 mg/dL   6-29 days:   <15.0 mg/dL    ALP 04/21/2025 93  40 - 150 unit/L Final    AST 04/21/2025 17  11 - 45 unit/L Final    ALT 04/21/2025 11  10 - 44 unit/L Final    Anion Gap 04/21/2025 11  8 - 16 mmol/L Final    eGFR 04/21/2025 >60  >60 mL/min/1.73/m2 Final    Estimated GFR calculated using the CKD-EPI creatinine (2021) equation.    Ferritin 04/21/2025 48.0  20.0 - 300.0 ng/mL Final    WBC 04/21/2025 5.50  3.90 - 12.70 K/uL Final    RBC 04/21/2025 3.68 (L)  4.00 - 5.40 M/uL Final    HGB 04/21/2025 11.4 (L)  12.0 - 16.0 gm/dL Final    HCT 04/21/2025 35.1 (L)  37.0 - 48.5 % Final    MCV 04/21/2025 95  82 - 98 fL Final    MCH 04/21/2025 31.0  27.0 - 31.0 pg Final    MCHC 04/21/2025 32.5  32.0 - 36.0 g/dL Final    RDW 04/21/2025 14.6 (H)  11.5 - 14.5 % Final    Platelet Count 04/21/2025 192  150 - 450 K/uL Final    MPV 04/21/2025 12.2  9.2 - 12.9 fL Final    Nucleated RBC 04/21/2025 0  <=0 /100 WBC Final    Neut % 04/21/2025 63.6  38 - 73 % Final    Lymph % 04/21/2025 23.5  18 - 48 % Final    Mono % 04/21/2025 9.6  4 - 15 % Final    Eos % 04/21/2025 2.5  <=8 % Final    Basophil % 04/21/2025 0.4  <=1.9 % Final    Imm Grans % 04/21/2025 0.4  0.0 - 0.5 % Final    Neut # 04/21/2025 3.50  1.8 - 7.7 K/uL Final    Lymph # 04/21/2025 1.29  1 - 4.8 K/uL Final    Mono # 04/21/2025 0.53  0.3 - 1 K/uL  Final    Eos # 04/21/2025 0.14  <=0.5 K/uL Final    Baso # 04/21/2025 0.02  <=0.2 K/uL Final    Imm Grans # 04/21/2025 0.02  0.00 - 0.04 K/uL Final    Mild elevation in immature granulocytes is non specific and can be seen in a variety of conditions including stress response, acute inflammation, trauma and pregnancy. Correlation with other laboratory and clinical findings is essential.   Lab Requisition on 04/07/2025   Component Date Value Ref Range Status    Sodium 04/07/2025 137  136 - 145 mmol/L Final    Potassium 04/07/2025 4.3  3.5 - 5.1 mmol/L Final    Chloride 04/07/2025 105  95 - 110 mmol/L Final    CO2 04/07/2025 25  23 - 29 mmol/L Final    Glucose 04/07/2025 75  70 - 110 mg/dL Final    BUN 04/07/2025 25 (H)  8 - 23 mg/dL Final    Creatinine 04/07/2025 0.6  0.5 - 1.4 mg/dL Final    Calcium 04/07/2025 8.7  8.7 - 10.5 mg/dL Final    Protein Total 04/07/2025 8.4  6.0 - 8.4 gm/dL Final    Albumin 04/07/2025 3.2 (L)  3.5 - 5.2 g/dL Final    Bilirubin Total 04/07/2025 0.4  0.1 - 1.0 mg/dL Final    For infants and newborns, interpretation of results should be based   on gestational age, weight and in agreement with clinical   observations.    Premature Infant recommended reference ranges:   0-24 hours:  <8.0 mg/dL   24-48 hours: <12.0 mg/dL   3-5 days:    <15.0 mg/dL   6-29 days:   <15.0 mg/dL    ALP 04/07/2025 111  40 - 150 unit/L Final    AST 04/07/2025 21  11 - 45 unit/L Final    ALT 04/07/2025 10  10 - 44 unit/L Final    Anion Gap 04/07/2025 7 (L)  8 - 16 mmol/L Final    eGFR 04/07/2025 >60  >60 mL/min/1.73/m2 Final    Estimated GFR calculated using the CKD-EPI creatinine (2021) equation.    CRP 04/07/2025 41.2 (H)  <=8.2 mg/L Final    Extra Tube 04/07/2025 Hold for add-ons.   Final    Auto resulted.          Assessment & Plan:     Assessment & Plan     Mild anemia likely due to chronic inflammation from skin condition and joint issues.   Renal function WNL based on creatinine and eGFR.   Suspect recurrence of  right femoral hernia based on exam.   Reviewed surgical history, including previous hiatal and inguinal/femoral hernia repairs.    SYSTEMIC SCLEROSIS AND CHRONIC FOOT ULCERS:   Noted that the patient reports foot ulcers are not healing as expected despite antibiotic treatment, due to scleroderma.   Observed chronic wounds on feet related to scleroderma, causing difficulty in walking and balance.   Patient is receiving home health care nurse visits twice weekly for wound care.   Recommend a compounded topical medication as per podiatrist's suggestion, pending insurance approval.   Monitored chronic ulcers on right foot that are not healing despite antibiotic treatment.   Documented the condition with photographs taken by the podiatrist.   Monitored chronic ulcers on left foot that are not healing despite antibiotic treatment.   Documented the condition with photographs taken by the podiatrist.   Encouraged the patient to maintain mobility for circulation and muscle strength.    GAIT AND MOBILITY ABNORMALITIES:   Noted the patient's report of difficulty in walking and balance due to foot bandages.    BACK PAIN:   Noted occasional back pain related to overexertion during house cleaning.   Assessed that the back pain is not chronic and resolves with rest.    WHEEZING:   Noted the patient's report of occasional wheezing, especially during winter, and daily phlegm production.   Detected occasional wheezing during physical exam.   Verified that the patient has an albuterol inhaler for use as needed, but it may have .   Advised the patient to check the expiration date of the albuterol inhaler and replace if necessary.    RECURRENT FEMORAL HERNIA:   Provided information on femoral hernias, including typical presentation and potential for recurrence.   Noted a growing, tender lump in the groin area reported by the patient.   Examined the area and suspected a recurrent femoral hernia.   Referred the patient to   Katherine Segura (General Surgery) for evaluation of potential recurrent femoral hernia.    STRESS INCONTINENCE:   Noted ongoing bladder control issues reported by the patient.   Acknowledged that the patient underwent pelvic floor physical therapy, which helped somewhat but requires ongoing exercises.   Advised the patient to continue wearing sanitary pads for protection against leaks.    VITAMIN D DEFICIENCY:   Explained the difference between blood calcium levels and bone calcium content.   Discussed regulation of blood calcium by parathyroid hormone and vitamin D.   Continued prescription of Vitamin D 50,000 units every 7 days.   Ordered Vitamin D level test as it has been a while since the last test.   Noted that the patient is taking high-dose prescription Vitamin D (50,000 units weekly) and OTC supplements.    LONG-TERM USE OF OPIATE ANALGESICS:   Noted that the patient is taking multiple pain medications, including opiates, for pain management.    FOLLOW-UP:   Follow up on May 1st at the main campus for lab work, to coincide with appointment with Dr. Browne.   Ordered B12 level test.       Time spent with patient was 42 minutes.    Follow up in about 6 months (around 10/28/2025).     Aly Rand MD

## 2025-04-29 DIAGNOSIS — M34.9 SCLERODERMA: ICD-10-CM

## 2025-05-01 ENCOUNTER — OFFICE VISIT (OUTPATIENT)
Dept: PODIATRY | Facility: CLINIC | Age: 75
End: 2025-05-01
Payer: MEDICARE

## 2025-05-01 ENCOUNTER — LAB VISIT (OUTPATIENT)
Dept: LAB | Facility: HOSPITAL | Age: 75
End: 2025-05-01
Attending: INTERNAL MEDICINE
Payer: MEDICARE

## 2025-05-01 VITALS
BODY MASS INDEX: 23.46 KG/M2 | SYSTOLIC BLOOD PRESSURE: 132 MMHG | HEIGHT: 65 IN | HEART RATE: 75 BPM | DIASTOLIC BLOOD PRESSURE: 72 MMHG

## 2025-05-01 DIAGNOSIS — L97.922 ULCERS OF BOTH LOWER EXTREMITIES WITH FAT LAYER EXPOSED: Primary | ICD-10-CM

## 2025-05-01 DIAGNOSIS — D64.9 ANEMIA, UNSPECIFIED TYPE: ICD-10-CM

## 2025-05-01 DIAGNOSIS — L03.119 CELLULITIS OF LOWER EXTREMITY, UNSPECIFIED LATERALITY: ICD-10-CM

## 2025-05-01 DIAGNOSIS — D52.0 DIETARY FOLATE DEFICIENCY ANEMIA: ICD-10-CM

## 2025-05-01 DIAGNOSIS — L97.912 ULCERS OF BOTH LOWER EXTREMITIES WITH FAT LAYER EXPOSED: Primary | ICD-10-CM

## 2025-05-01 DIAGNOSIS — E55.9 VITAMIN D DEFICIENCY: ICD-10-CM

## 2025-05-01 LAB
25(OH)D3+25(OH)D2 SERPL-MCNC: 43 NG/ML (ref 30–96)
VIT B12 SERPL-MCNC: 565 PG/ML (ref 210–950)

## 2025-05-01 PROCEDURE — 36415 COLL VENOUS BLD VENIPUNCTURE: CPT | Mod: NTX

## 2025-05-01 PROCEDURE — 99215 OFFICE O/P EST HI 40 MIN: CPT | Mod: S$PBB,,, | Performed by: STUDENT IN AN ORGANIZED HEALTH CARE EDUCATION/TRAINING PROGRAM

## 2025-05-01 PROCEDURE — 82306 VITAMIN D 25 HYDROXY: CPT | Mod: TXP

## 2025-05-01 PROCEDURE — 99999 PR PBB SHADOW E&M-EST. PATIENT-LVL IV: CPT | Mod: PBBFAC,,, | Performed by: STUDENT IN AN ORGANIZED HEALTH CARE EDUCATION/TRAINING PROGRAM

## 2025-05-01 PROCEDURE — 99214 OFFICE O/P EST MOD 30 MIN: CPT | Mod: PBBFAC | Performed by: STUDENT IN AN ORGANIZED HEALTH CARE EDUCATION/TRAINING PROGRAM

## 2025-05-01 PROCEDURE — 82607 VITAMIN B-12: CPT | Mod: TXP

## 2025-05-01 RX ORDER — FUROSEMIDE 20 MG/1
20 TABLET ORAL
COMMUNITY
Start: 2024-06-11

## 2025-05-01 RX ORDER — PREGABALIN 300 MG/1
CAPSULE ORAL
COMMUNITY
Start: 2024-11-12

## 2025-05-01 RX ORDER — RABEPRAZOLE SODIUM 20 MG/1
20 TABLET, DELAYED RELEASE ORAL 2 TIMES DAILY
COMMUNITY
Start: 2024-09-17

## 2025-05-01 RX ORDER — TADALAFIL 20 MG/1
20 TABLET ORAL
COMMUNITY
Start: 2024-08-20

## 2025-05-01 RX ORDER — MYCOPHENOLATE MOFETIL 200 MG/ML
1000 POWDER, FOR SUSPENSION ORAL 2 TIMES DAILY
Qty: 480 ML | Refills: 1 | Status: ACTIVE | OUTPATIENT
Start: 2025-05-01 | End: 2025-08-05

## 2025-05-01 RX ORDER — ACETAMINOPHEN AND CODEINE PHOSPHATE 300; 60 MG/1; MG/1
TABLET ORAL
COMMUNITY
Start: 2025-01-06

## 2025-05-01 RX ORDER — TIZANIDINE HYDROCHLORIDE 4 MG/1
CAPSULE, GELATIN COATED ORAL
COMMUNITY
Start: 2024-10-15

## 2025-05-01 RX ORDER — NABUMETONE 750 MG/1
750 TABLET, FILM COATED ORAL
COMMUNITY
Start: 2024-10-22

## 2025-05-01 RX ORDER — SODIUM CHLORIDE 0.9 % (FLUSH) 0.9 %
SYRINGE (ML) INJECTION
COMMUNITY
Start: 2025-03-17

## 2025-05-01 NOTE — PROGRESS NOTES
Subjective:     Patient    Yamel Shah is a 74 y.o. female.    Problem    2024: Previously seen by multiple wound care providers without significant improvement in wounds, has bounced around a lot. Has bilateral foot wounds related to scleroderma and immunosuppressant medications. Had improvement in granulation with regular wound care, oral and topical antibiotics, one debridement. Attempted topical vitamin E but caused burning.     01/03/25: Returns for bilateral foot wound care. No new concerns. Had pain flare on left foot, still sensitive.     01/08/25: Returns for bilateral foot wound care. No new concerns.     01/16/25: Returns for bilateral foot wound care. Awaiting appointment with ID 02/14. No new concerns.     01/24/25: Returns for bilateral foot wound care. Pain controlled on current medications. No new concerns.     01/31/25: Returns for bilateral foot wound care. Not tolerating amitriptyline well; still with nerve pain, tingling, cramping, burning despite duloxetine, tolerates duloxetine well.     02/07/25: Returns for bilateral foot wound care. Increased duloxetine at last visit for BLE nerve pain with significant improvement in pain without side effects. ID appointment rescheduled to 02/24.     02/14/25: Returns for bilateral foot wound care. Wants to restart PO antibiotics until she sees ID, thinks she was progressing better at that time.     02/21/25: Returns for bilateral foot wound care. Wounds seem somewhat better on PO antibiotics, ID appointment next week.     02/25/25: Returns for bilateral foot wound care. Completed recent PO antibiotics, recent cultures + for Pseudomonas resistant to PO antibiotics; has first ID appointment tomorrow.     03/07/25: Returns for bilateral foot wound care. Getting OPAT per ID soon. Pain OK.     03/12/25: Returns for bilateral foot wound care. Will start antibiotics within next week. Pain OK except severe at left 2nd toe dorsally. Wants  debridement of left 2nd toe today.     03/19/25: Returns for bilateral foot wound care. On IV antibiotics. No new concerns.     03/27/25: Returns for bilateral foot wound care. Still on IV antibiotics. No new concerns.     04/04/25: Returns for bilateral foot wound care. Stopped IV antibiotics but still has the line in. Going on vacation for 2 weeks soon.     04/25/25: Returns for bilateral foot wound care. Newer cultures with polymicrobial growth, ID not concerned. Does not feel wounds significantly improved with IV antibiotics.     05/01/25: Returns for bilateral foot wound care. Awaiting topical antibiotics from [facility].     History    History obtained from patient and review of medical records.     Past Medical History:   Diagnosis Date    Abnormal Pap smear     Acid reflux     Allergy     Arthritis     Encounter for blood transfusion     GERD (gastroesophageal reflux disease)     Hiatal hernia 03/24/2023    History of migraine headaches     Hx of colonic polyp     Hypertension     Idiopathic neuropathy 07/20/2012    ILD (interstitial lung disease) 11/06/2013    Iron deficiency anemia 03/18/2014    MRSA carrier     Osteopenia     Pneumonia     Pulmonary fibrosis     Pulmonary hypertension     Raynaud's disease     Scleroderma, diffuse     Sjogren's syndrome     Vitamin D deficiency 11/14/2013       Past Surgical History:   Procedure Laterality Date    24 HOUR IMPEDANCE PH MONITORING OF ESOPHAGUS IN PATIENT NOT TAKING ACID REDUCING MEDICATIONS N/A 03/04/2020    Procedure: IMPEDANCE PH STUDY, ESOPHAGEAL, 24 HOUR, IN PATIENT NOT TAKING ACID REDUCING MEDICATION;  Surgeon: Annamaria Mendoza MD;  Location: [facility] ([address] [place] [address]);  Service: Endoscopy;  Laterality: N/A;  OFF PPI/H2 Blocker   Motility Studies   Hold Narcotics x 1 days   Hold TCA x 1 days  2/26 - LVM attempting to confirm appt  2/27 - Confirmed appt    ANORECTAL MANOMETRY N/A 05/10/2021    Procedure: MANOMETRY, ANORECTAL with balloon expulsion test;   Surgeon: Annamaria Mendoza MD;  Location: [facility] (4TH FLR);  Service: Endoscopy;  Laterality: N/A;  order combined  covid test 5/7 [facility], instructions emailed-KP[facility]    BREAST BIOPSY      Left, benign    BRONCHOSCOPY N/A 10/2/2023    Procedure: bronch;  Surgeon: Roberta Leigh DO;  Location: [facility] OR 2ND FLR;  Service: Endoscopy;  Laterality: N/A;    BRONCHOSCOPY N/A 9/16/2024    Procedure: Flexible bronchoscopy (BAL only);  Surgeon: Roberta Leigh DO;  Location: [facility] OR 2ND FLR;  Service: Endoscopy;  Laterality: N/A;    CERVICAL CONIZATION   W/ LASER  1970    COLONOSCOPY      COLONOSCOPY N/A 03/29/2019    Procedure: COLONOSCOPY;  Surgeon: Annamaria Mendoza MD;  Location: [facility] (2ND FLR);  Service: Endoscopy;  Laterality: N/A;    COLONOSCOPY N/A 05/10/2021    Procedure: COLONOSCOPY;  Surgeon: Annamaria Mendoza MD;  Location: [facility] (4TH FLR);  Service: Endoscopy;  Laterality: N/A;  2nd floor-previous scopes done on 2nd floor, gastroparesis  full liquid diet x2 days, clear liquid x1 day prior to procedure  covid test 5/7 [place] day, instructions emailed-[facility]  5/6 pt confirmed appt-[facility]    DILATION AND CURETTAGE OF UTERUS      ESOPHAGEAL MANOMETRY WITH MEASUREMENT OF IMPEDANCE N/A 03/04/2020    Procedure: MANOMETRY, ESOPHAGUS, WITH IMPEDANCE MEASUREMENT;  Surgeon: Annamaria Mendoza MD;  Location: [facility] (4TH FLR);  Service: Endoscopy;  Laterality: N/A;  OFF PPI/H2 Blocker   Motility Studies   Hold Narcotics x 1 days   Hold TCA x 1 days    ESOPHAGOGASTRODUODENOSCOPY      ESOPHAGOGASTRODUODENOSCOPY N/A 03/29/2019    Procedure: EGD (ESOPHAGOGASTRODUODENOSCOPY);  Surgeon: Annamaria Mendoza MD;  Location: [facility] (2ND FLR);  Service: Endoscopy;  Laterality: N/A;  pulmonary htn    ESOPHAGOGASTRODUODENOSCOPY N/A 02/02/2021    Procedure: ESOPHAGOGASTRODUODENOSCOPY (EGD);  Surgeon: Annamaria Mendoza MD;  [facility]: [facility] (2ND FLR);  Service: Endoscopy;  Laterality: N/A;  2nd floor gastroparesis  3 days  full liquid diet and 1 day clears  covid test 1/30 primary care, instructions sent to [facility]    ESOPHAGOGASTRODUODENOSCOPY N/A 07/01/2022    Procedure: ESOPHAGOGASTRODUODENOSCOPY (EGD);  Surgeon: Annamaria Mendoza MD;  Location: [facility] (2ND FLR);  Service: Endoscopy;  Laterality: N/A;  2nd floor-gastroparesis  full liquid diet x3 days, clear liquid diet x1 day prior to procedure  fully vaccinated, instructions sent to [facility]  6/29-pt confirmed new arrival time-[facility]    EXCISION, LESION, STOMACH, LAPAROSCOPIC N/A 03/23/2023    Procedure: XI ROBOTIC EXCISION, LESION, STOMACH;  Surgeon: Katherine Segura MD;  [facility];  Service: General;  Laterality: N/A;    EXCISIONAL BIOPSY, LYMPH NODE Right 05/26/2023    Procedure: EXCISIONAL BIOPSY, LYMPH NODE, INGUINAL;  Surgeon: Katherine Segura MD;  [address]  Service: General;  Laterality: Right;    HYSTERECTOMY  1990    FRANDY (AUB, Fibroids), ovaries remain    REPAIR, HERNIA, UMBILICAL N/A 03/23/2023    Procedure: REPAIR, HERNIA, UMBILICAL;  Surgeon: Katherine Segura MD;  [facility];  Service: General;  Laterality: N/A;    RIGHT HEART CATHETERIZATION Right 01/05/2021    Procedure: INSERTION, CATHETER, RIGHT HEART;  Surgeon: Aurelaino Sy MD;  Location: [facility] CATH LAB;  Service: Cardiology;  Laterality: Right;    RIGHT HEART CATHETERIZATION Right 03/16/2023    Procedure: INSERTION, CATHETER, RIGHT HEART;  Surgeon: Aureliano Sy MD;  Location: [facility] CATH LAB;  Service: Cardiology;  Laterality: Right;    RIGHT HEART CATHETERIZATION Right 11/22/2024    Procedure: INSERTION, CATHETER, RIGHT HEART;  Surgeon: Tamanna Martins MD;  Location: [facility] CATH LAB;  Service: Cardiology;  Laterality: Right;    ROBOT-ASSISTED LAPAROSCOPIC REPAIR OF INGUINAL HERNIA USING DA VERNELL XI Right 05/26/2023    Procedure: XI ROBOTIC REPAIR, HERNIA, INGUINAL, FEMORAL;  Surgeon: Katherine Segura,  MD;  Location: [facility] OR [facility] FLR;  Service: General;  Laterality: Right;    ROBOT-ASSISTED REPAIR OF HIATAL HERNIA USING DA VERNELL XI N/A 2023    Procedure: XI ROBOTIC REPAIR, HERNIA, HIATAL;  Surgeon: Katherine Segura MD;  [facility];  Service: General;  Laterality: N/A;    VARICOSE VEIN SURGERY      XI ROBOTIC GASTROPEXY N/A 2023    Procedure: XI ROBOTIC GASTROPEXY;  Surgeon: Katherine Segura MD;  [facility];  Service: General;  Laterality: N/A;    XI ROBOTIC PYLOROMYOTOMY N/A 2023    Procedure: XI ROBOTIC PYLOROMYOTOMY;  Surgeon: Katherine Segura MD;  [facility];  Service: General;  Laterality: N/A;        Objective:     Vitals  Wt Readings from Last 1 Encounters:   25 64 kg (141 lb)     Temp Readings from Last 1 Encounters:   25 97.3 °F (36.3 °C) (Skin)     BP Readings from Last 1 Encounters:   25 132/72     Pulse Readings from Last 1 Encounters:   25 75       Dermatological Exam    Skin:  Skin of both feet thickened (scleroderma), stiff, with extensive ulcerations of both feet down to fat with slowly improving granulation; chronic low level infection, xerosis  Tender hypergranular tissue at dorsal 2nd toe PIPJ less prominent s/p debridement     Nails:  10 nail(s) thickened, and 10 nail(s) discolored    Vascular Exam    Arteries:  Posterior tibial artery palpable on right  Dorsalis pedis artery palpable on right  Posterior tibial artery palpable on left  Dorsalis pedis artery palpable on left    Veins:  Superficial veins unremarkable on right  Superficial veins unremarkable on left    Swellin+ nonpitting on right  1+ nonpitting on left    Neurological Exam    Weston touch test:  6/6 sites sensed, light touch intact     Musculoskeletal Exam    Footwear:  Casual on right  Casual on left    Gait Exam:   Ambulatory Status: Ambulatory  Gait: Normal  Assistive Devices: None    Foot Progression  Angle:  Normal on right  Normal on left    Right Lower Extremity Additional Findings:  Right foot and ankle function, strength, and range of motion unremarkable except as noted above.     Left Lower Extremity Additional Findings:  2nd hammertoe  Left foot and ankle function, strength, and range of motion unremarkable except as noted above.    Imaging and Other Tests    Imagin/26/25 left foot X rays: 2nd hammertoe deformity, centered at PIPJ    Independently reviewed and interpreted imaging, findings are as follows: N/A     Assessment:     Encounter Diagnoses   Name Primary?    Ulcers of both lower extremities with fat layer exposed Yes    Cellulitis of lower extremity, unspecified laterality              Plan:     I counseled the patient on her conditions, their implications and medical management.    Neuropathy: chronic stable  -Failed amitriptyline.   -Continue duloxetine 120 mg/day.      Bilateral ulcers, cellulitis, swelling: chronic stable    -Dressings changed. HH dressing changes.   -Protected WB in surgical shoes.      -Awaiting compounded topical antibiotics from [facility], to be used during dressing changes.     Scleroderma: chronic stable  -Continue PO tadalafil 40 mg/day.      I spent a total of 40 minutes on the day of the visit.  This includes face to face time and non-face to face time preparing to see the patient (eg, review of tests), obtaining and/or reviewing separately obtained history, documenting clinical information in the electronic or other health record, independently interpreting results and communicating results to the patient/family/caregiver, or care coordinator.     Return to clinic in 1-2 weeks, call sooner PRN.

## 2025-05-02 ENCOUNTER — TELEPHONE (OUTPATIENT)
Dept: SURGERY | Facility: CLINIC | Age: 75
End: 2025-05-02
Payer: MEDICARE

## 2025-05-05 ENCOUNTER — OFFICE VISIT (OUTPATIENT)
Dept: RHEUMATOLOGY | Facility: CLINIC | Age: 75
End: 2025-05-05
Payer: MEDICARE

## 2025-05-05 DIAGNOSIS — D84.821 IMMUNOSUPPRESSION DUE TO DRUG THERAPY: ICD-10-CM

## 2025-05-05 DIAGNOSIS — D50.9 IRON DEFICIENCY ANEMIA, UNSPECIFIED IRON DEFICIENCY ANEMIA TYPE: ICD-10-CM

## 2025-05-05 DIAGNOSIS — Z79.899 IMMUNOSUPPRESSION DUE TO DRUG THERAPY: ICD-10-CM

## 2025-05-05 DIAGNOSIS — M85.89 OSTEOPENIA OF MULTIPLE SITES: ICD-10-CM

## 2025-05-05 DIAGNOSIS — I27.29 PULMONARY HYPERTENSION ASSOCIATED WITH SYSTEMIC DISORDER: Chronic | ICD-10-CM

## 2025-05-05 DIAGNOSIS — J84.9 ILD (INTERSTITIAL LUNG DISEASE): ICD-10-CM

## 2025-05-05 DIAGNOSIS — I27.21 WHO GROUP 1 PULMONARY ARTERIAL HYPERTENSION: ICD-10-CM

## 2025-05-05 DIAGNOSIS — I87.2 VENOUS INSUFFICIENCY OF BOTH LOWER EXTREMITIES: ICD-10-CM

## 2025-05-05 DIAGNOSIS — M34.9 SCLERODERMA: Primary | ICD-10-CM

## 2025-05-05 PROCEDURE — 98006 SYNCH AUDIO-VIDEO EST MOD 30: CPT | Mod: 95,GC,NTX, | Performed by: INTERNAL MEDICINE

## 2025-05-05 NOTE — PROGRESS NOTES
I have personally reviewed the history, confirmed exam findings, and discussed assessment and plan with fellow.       The patient location is: home  The chief complaint leading to consultation is: dsSSc    Visit type: audiovisual    Face to Face time with patient: 15 min  30 minutes of total time spent on the encounter, which includes face to face time and non-face to face time preparing to see the patient (eg, review of tests), Obtaining and/or reviewing separately obtained history, Documenting clinical information in the electronic or other health record, Independently interpreting results (not separately reported) and communicating results to the patient/family/caregiver, or Care coordination (not separately reported).         Each patient to whom he or she provides medical services by telemedicine is:  (1) informed of the relationship between the physician and patient and the respective role of any other health care provider with respect to management of the patient; and (2) notified that he or she may decline to receive medical services by telemedicine and may withdraw from such care at any time.    Notes:        Answers submitted by the patient for this visit:  Rheumatology Questionnaire (Submitted on 5/5/2025)  fever: No  eye redness: No  mouth sores: No  headaches: No  shortness of breath: No  chest pain: No  trouble swallowing: Yes  diarrhea: No  constipation: No  unexpected weight change: No  genital sore: No  dysuria: No  During the last 3 days, have you had a skin rash?: No  Bruises or bleeds easily: Yes  cough: No   Latest Reference Range & Units 04/21/25 09:33   WBC 3.90 - 12.70 K/uL 5.50   RBC 4.00 - 5.40 M/uL 3.68 (L)   Hemoglobin 12.0 - 16.0 gm/dL 11.4 (L)   Hematocrit 37.0 - 48.5 % 35.1 (L)   MCV 82 - 98 fL 95   MCH 27.0 - 31.0 pg 31.0   MCHC 32.0 - 36.0 g/dL 32.5   RDW 11.5 - 14.5 % 14.6 (H)   Platelet Count 150 - 450 K/uL 192   MPV 9.2 - 12.9 fL 12.2   Neut % 38 - 73 % 63.6   Lymph % 18 - 48 %  23.5   Mono % 4 - 15 % 9.6   Eos % <=8 % 2.5   Basophil % <=1.9 % 0.4   Immature Granulocytes 0.0 - 0.5 % 0.4   Gran # (ANC) 1.8 - 7.7 K/uL 3.50   Lymph # 1 - 4.8 K/uL 1.29   Mono # 0.3 - 1 K/uL 0.53   Eos # <=0.5 K/uL 0.14   Baso # <=0.2 K/uL 0.02   Immature Grans (Abs) 0.00 - 0.04 K/uL 0.02   nRBC <=0 /100 WBC 0   Ferritin 20.0 - 300.0 ng/mL 48.0   Sodium 136 - 145 mmol/L 141   Potassium 3.5 - 5.1 mmol/L 3.9   Chloride 95 - 110 mmol/L 107   CO2 23 - 29 mmol/L 23   Anion Gap 8 - 16 mmol/L 11   BUN 8 - 23 mg/dL 19   Creatinine 0.5 - 1.4 mg/dL 0.7   eGFR >60 mL/min/1.73/m2 >60   Glucose 70 - 110 mg/dL 87   Calcium 8.7 - 10.5 mg/dL 8.7   ALP 40 - 150 unit/L 93   PROTEIN TOTAL 6.0 - 8.4 gm/dL 8.0   Albumin 3.5 - 5.2 g/dL 3.0 (L)   BILIRUBIN TOTAL 0.1 - 1.0 mg/dL 0.3   AST 11 - 45 unit/L 17   ALT 10 - 44 unit/L 11   (L): Data is abnormally low  (H): Data is abnormally high    PFTs       FVC     SVC      RV     DLco    TLC        2/17/25   72.2                  58.1    53          59  8/22/24   61.9                  55.2    49.5      58.6  3/4/24     75.1                  47.2    40.4      63.3  9/18/23   66.1                  44.2    42.1      52.8  5/22/23   71.1                   68.5   52.3      66.8  10/7/22   67.6                    63.1  68.9      62.7  1/3/22     67.5   5/25/21   67.8                    59.3   64.1      62.4  1/10/20    74                                72  3/12/19    60         64         61      82  3/6/18      64         70         64      112  4/8/16      70         70         89       84  9/4/15      66         62         72       71  1/19/15    66              EXAMINATION:  CT CHEST WITHOUT CONTRAST     CLINICAL HISTORY:  Interstitial lung disease;pulmonary nodule; Interstitial pulmonary disease, unspecified     TECHNIQUE:  Low dose axial images, sagittal and coronal reformations were obtained from the thoracic inlet to the lung bases. Contrast was not administered.      COMPARISON:  10/25/2024 and additional prior     FINDINGS:  Support tubes and lines: None.     Aorta: Normal caliber.     Heart: Normal size.     Coronary arteries: No coronary artery calcifications.     Pericardium: Normal. No effusion, thickening, or calcification.     Central pulmonary arteries: Normal caliber.     Base of neck/thyroid: Unremarkable.     Lymph nodes: Mediastinal lymph nodes are present that, although not increased in size, are increased in number.  This finding is not substantially changed going back as far as 06/02/2023     Esophagus: Fluid is present in the esophagus to the level of the thoracic inlet     Pleura: No effusion, thickening, or calcification.     Upper abdomen: Unremarkable.     Body wall: Unremarkable.     Airways: There is bibasilar bronchiectasis and bronchiolectasis, most pronounced in the lower lobes, to a similar degree going back at least as far as 06/02/2023.     Lungs: Well-demarcated areas of ground-glass opacification are present in the lower lobes to a similar degree when compared to exams going back at least as far as 06/02/2023.     Mild honeycombing is noted in the subpleural regions of the anterior portions of the upper lobes, unchanged when compared to 09/13/2024.     A 9 mm irregular nodule in the right upper lobe (series 4, image 183) is unchanged when compared to the 09/13/2024 but new when compared to more remote imaging.     An irregular nodule in the right upper lobe measuring 17 x 4 mm (series 4, image 160) is stable to slightly decreased in size when compared to 06/02/2023.  Additional irregular nodules are stable.     Irregular nodule in the left upper lobe measuring 1.5 x 0.8 cm (series 4, image 117) is stable when compared to 10/25/2024 but decreased in size when compared to 09/13/2024.  Substantially decreased in size when compared to 10/25/2024.     Bones: Unremarkable.     Impression:     1. Irregular 9 mm nodule in the right upper lobe, increased  in size when compared to 10/25/2024 and new when compared to 09/08/2023.  Given the context of multiple waxing and waning irregular nodules and airspace opacities, this is favored to be secondary to an underlying infectious etiology.  However, continued attention on follow-up.  2. No change in the irregular left upper lobe nodule when compared to 10/25/2024 but substantially decreased in size when compared to 09/13/2024.  3. No change in the basal predominant ground glass opacification and traction bronchiectasis/bronchiolectasis going back at least as far as 6/2/2023.        Electronically signed by:Irina Brush  Date:                                            02/17/2025  Time:                                           13:11              TTE 3/28/25:        Left Ventricle: The left ventricle is normal in size. Normal wall thickness. There is normal systolic function with a visually estimated ejection fraction of 60 - 65%. There is normal diastolic function.    Right Ventricle: The right ventricle is normal in size. Wall thickness is normal. Systolic function is normal.    Biatrial enlargement    Pulmonary Artery: The estimated pulmonary artery systolic pressure is 30 mmHg.    IVC/SVC: Normal venous pressure at 3 mmHg.           RHC 11/22/4:   Right Heart Pressures     RA: 8/ 6/ 6 RV: 32/ 3/ 8 PA: 32/ 10/ 18 PWP: 15/ 16/ 12 .   Cardiac output was 4.92  by Ivana. Cardiac index is 2.92 L/min/m2.       Thermodilution CO/CI 5.64/3.34.   Normal right and left sided filling pressures.   No pulmonary hypertension present.   TPG  6   PVR  1.22 ivana/ 1.06           PEMA  3/6/25    Normal resting ABIs bilaterally.    PVR waveforms are mildly dampened at the low thigh level on the right, calf level on the right, and ankle level bilaterally.  Saw Lexa Bush in [facility] 2/17/25:     Assessment:  1. ILD (interstitial lung disease)    2. Scleroderma with pulmonary involvement    3. WHO group 1 pulmonary arterial hypertension    4.  Gastroesophageal reflux disease, unspecified whether esophagitis present    5. Skin ulcers of both feet       Plan:      Followed for SSc-ILD with PAH. On MMF and OFEV.  FVC/DLCO stable for the last two years.  Has known PH which is stable on Adcirca.      Continue to follow with GI for GERD/scleroderma esophagus. Encouraged continued sleeping on incline. Continue with PPI. Adequately controlled. Has follow up EGD/colonoscopy in 2025/2026.      Continue Adcirca and PH follow-up. Continue Lasix per PH team for lower extremity edema.       Interval improvement in SHAUNA cavitary lesion. New RUL 4mm pulmonary nodule (new since September 2023). Will follow up repeat CT chest done today.       Continue follow up with podiatry for bilateral skin ulcers on her feet.      5. RTC in 3 months or sooner if needed with repeat PFTs.         Lexa Bush NP      Saw Dr. Benites in [facility] 5/23/24:     Assessment:   1. Scleroderma with pulmonary involvement    2. Dysphagia, unspecified type    3. Scleroderma    4. Gastroesophageal reflux disease, unspecified whether esophagitis present          Plan:  -Increase Rabeprazole to 20mg twice per day for uncontrolled GERD.  -Plan for EGD and colonoscopy in late 2025/early 2026 for Pagan's esophagus and colon polyp surveillance.       Saw Dr Anthony VASQUEZ 2/25/25:      Bilateral foot wounds: wound cultures with FQ resistant pseudomonas aeruginosa. Slow wound healing. Intermediate to cefepime and LAURYN of 16 for pip-tazo. Will use meropenem, plan on at least 7 days, but up to 14 days of treatment. Will need PICC line.     1) Antibiotics: Meropenem     Intravenous antibiotics:  1 gram IV q 8 hours      2) Therapy Duration:  14 days     Estimated end date of IV antibiotics: 3/11/2025     3) Outpatient Weekly Labs:     Order the following labs to be drawn on Mondays:   CBC  CMP   CRP     4) [facility] Results [facility]: [phone]      Follow up in 2 weeks     Dr. Nava Podiatry 5/1/25:    Bilateral ulcers, cellulitis, swelling: chronic stable    -Dressings changed. HH dressing changes.   -Protected WB in surgical shoes.      -Awaiting compounded topical antibiotics from [facility], to be used during dressing changes.     ASSESSMENT:         dcSSc  most recent MRSS LCV 10 compared with  12 last visit in July 2024 and 14 prior to that can't be done on virtual visit  ILD  significantly improved FVC other PFTs stable; HRCT chest stable since 2023  Now clinically respiratory status  approximately at baseline,   Chronic foot ulcers, venous stasis, venous insufficiency and scleroderma with Pseudomonas growth left wound culture. Dr. Nava   now on  was on Augmentin and levofloxacin(resistant ) then meropenem 3/17/25-3/31/25 and followed ID Jaqui Cruz 2/26/25  PH normal PAP on  RHC 11/22/24  ROXANNE                               PLAN:       *F/u Dr. Cruz in ID for Pseudomonas left foot ulcer wounds  * F/u Dr. Nava in  Podiatry for foot ulcers with Pseudomomas infection left    To resume MMF suspension   1000 mg twice daily now that completed antibiotics  Arterial and venous Doppler US LEs  F/u [facility] Dr Benites for dysphagia and ROXANNE last seen 5/23/24  Continue nintedanib 100mg twice daily       No need to add rituximab as recent exacerbation infectious rather than rapid progession of ILD     RTC 3 months with standing labs

## 2025-05-05 NOTE — Clinical Note
After Visit Summary   9/6/2017    Sadia Rider    MRN: [id number]           Patient Information     Date Of Birth          1944        Visit Information        Provider Department      9/6/2017 2:30 PM Nurse, Juan Max [facility] Derm[facility]ologic Surgery        Today's Diagnoses     Visit for wound check    -  1      Care Instructions    Wound care instructions for  ONE WEEK AFTER SURGERY    1. Leave the bandage on for 1 week after today's bandage change.  2. In 1 week you may resume your regular skin care when you remove the dressing. This includes washing with mild soap and water, applying moisturizer, make-up and sunscreen.  3. If the wound is open or bleeding in any part of the incision/graft site, you should cover the area with a bandage daily as directed below:    1. Clean and dry area with plain water using a Q-tip or sterile gauze pad.  2. Apply vaseline, aquaphor, polysporin, or bacitracin ointment to the wound  3. Cover the wound with a band-aid or a sterile non-stick gauze pad and micropore paper tape.      Repeat the instructions above until wound is completely healed    If you notice that the scar is red and firm (after you remove the dressing) this is normal and will fade over time. It may take up to 6-12 months for this to occur.    Massaging the area helps the scar fade and soften quicker. Begin to massage the area one month after the dressings have been removed. Apply pressure directly and firmly over the scar with the fingertips and move in circular motions. You may massage up to 10 times daily, each time for 30 seconds.    It is normal for there to be a tender, pimple-like bump along the scar about 6-8 weeks after surgery. This sometimes happens as the scar matures and the stitches beneath begin to dissolve. Do not pick or squeeze. It will resolve in time. If an area of the wound does open and there appears to be drainage, you may apply one of the ointments listed in the above  Good Morning,  We just saw this mutual pt in clinic today. She is overdue for f/u with your clinic and said she was having a difficult time getting in contact with the clinic, wanted to check in to help coordinate f/u for her if possible.  Thank you very much!  Regards, Saima Bennett MD Rheumatology  directions a few times every day until the wound is completely healed.    Numbness around the surgical site is normal. It can take up to 12-18 months for the feeling to return to normal. Itchiness, tingling, and occasional sharp pains might be present during this portion of the healing. All of these feelings will subside once the nerves have completely healed.      Phone numbers:  During business hours (M-F 8:00-4:30 p.m.)  [facility]-  [phone] Option 1 appt. desk  [phone]  Option 3 nurse triage line  ---------------------------------------------------------  Evenings/Weekends/Holidays  Hospital - [phone]   TTY for hearing pqwvchso-[phone]  *Ask  to page dermatologist on-call  Emergency Vfbg-[phone]  TTY for hearing impaired- [phone]            Follow-ups after your visit        Your next 10 appointments already scheduled     Jan 29, 2018  9:15 AM CST   VISUAL FIELD with [facility] EYE VISUAL FIELD   Eye Clinic ([facility])    Nestor Montemayor  [address] [place] [id number] [phone]            Jan 29, 2018  9:45 AM CST   RETURN GLAUCOMA with Evelyn Mathis MD   Eye Clinic ([facility])    Nestor Dudley  [address] [id number] [phone]              Who to contact     Please call your clinic at [phone] to:    Ask questions about your health    Make or cancel appointments    Discuss your medicines    Learn about your test results    Speak to your doctor   If you have compliments or concerns about an experience at your clinic, or if you wish to file a complaint, please contact [facility] Patient Relations at [phone] or email us at [email or web address]         Additional Information About Your Visit        MyChart Information     [employer] gives you secure access to your electronic health record. If  you see a primary care provider, you can also send messages to your care team and make appointments. If you have questions, please call your primary care clinic.  If you do not have a primary care provider, please call [phone] and they will assist you.      [employer] is an electronic gateway that provides easy, online access to your medical records. With [employer], you can request a clinic appointment, read your test results, renew a prescription or communicate with your care team.     To access your existing account, please contact your [facility] or call [phone] for assistance.        Care EveryWhere ID     This is your Care EveryWhere ID. This could be used by other organizations to access your [place] medical records  [id number]         Blood Pressure from Last 3 Encounters:   08/30/17 159/84   07/13/15 (!) 188/91   07/08/14 (!) 182/92    Weight from Last 3 Encounters:   07/13/15 79.3 kg (174 lb 12.8 oz)   07/08/14 78.7 kg (173 lb 9.6 oz)   08/19/11 80.2 kg (176 lb 11.2 oz)              Today, you had the following     No orders found for display       Primary Care Provider Office Phone # Fax #    Delfina Boyle [phone]       [facility] [place] [address] [place] [address]  [facility] [address]        Equal Access to Services     HIPOLITO ESTRELLA : Hadii aad ku hadasho Soomaali, waaxda luqadaha, qaybta kaalmada adeegyada, ela figueroa. So [facility] [phone].    ATENCIÓN: Si habla español, tiene a monroy disposición servicios gratuitos de asistencia lingüística. Llame al [phone].    We comply with applicable federal civil rights laws and [place] laws. We do not discriminate on the basis of race, color, national origin, age, disability sex, sexual orientation or gender identity.            Thank you!     Thank you for choosing [facility] DERMAT[facility]  for your care. Our goal is always to provide you with excellent care.  Hearing back from our patients is one way we can continue to improve our services. Please take a few minutes to complete the written survey that you may receive in the mail after your visit with us. Thank you!             Your Updated Medication List - Protect others around you: Learn how to safely use, store and throw away your medicines at www.disposemymeds.org.          This list is accurate as of: 9/6/17  3:21 PM.  Always use your most recent med list.                   Brand Name Dispense Instructions for use Diagnosis    aspirin 81 MG tablet      Take 1 tablet by mouth daily.        calcium carb-cholecalciferol 600-500 MG-UNIT Caps      Take 1 capsule by mouth 3 times daily        CENTRUM ULTRA WOMENS PO      Take 1 tablet by mouth daily ( calcium: 300 mg and vitamin d: 1000 units)    Osteoporosis, Vertebral compression fracture (H)       Fiber Tabs      Take 2 tablets by mouth daily.    Osteoporosis, Vertebral compression fracture (H)       simvastatin 10 MG tablet    ZOCOR     Take 1 tablet by mouth At Bedtime.    Osteoporosis, Vertebral compression fracture (H)       VITAMIN D3 PO      Take 1,000 mg by mouth daily

## 2025-05-05 NOTE — PROGRESS NOTES
The patient location is: [place]  The chief complaint leading to consultation is: follow up for scleroderma    Visit type: audiovisual    Face to Face time with patient: 20  35 minutes of total time spent on the encounter, which includes face to face time and non-face to face time preparing to see the patient (eg, review of tests), Obtaining and/or reviewing separately obtained history, Documenting clinical information in the electronic or other health record, Independently interpreting results (not separately reported) and communicating results to the patient/family/caregiver, or Care coordination (not separately reported).         Each patient to whom he or she provides medical services by telemedicine is:  (1) informed of the relationship between the physician and patient and the respective role of any other health care provider with respect to management of the patient; and (2) notified that he or she may decline to receive medical services by telemedicine and may withdraw from such care at any time.    Notes:             Subjective:      Patient ID: Yamel Shah is a 74 y.o. female.    Chief Complaint: follow up for scleroderma     Yamel Shah is a 75yo F with PMH of systemic sclerosis with interstitial lung disease, HFpEF, BLE venous insufficiency with chronic ulcers/wounds, s/p laser venous ablation (12/2020), GERD, Pagan's esophagus, s/p hernia repair (5/2023).    Rheum History:  - SSc with ILD dx in 1983  - Serologies: +SSA, +Scl-70  - Previously followed with podiatry and vascular surgery for the venous insufficiency and wounds, but now just cares for them herself  - Has chronically elevated sed rate and CRP  - RHC/LHC (3/2023) with normal pressures   - Follows with pulmonology Dr. Leigh, who was considering initiation of anti fibrotic therapy  - Follows with cardiology, last saw Claire Pelaez NP (9/2023)  - Was dx with covid while on a cruise on 8/28, was hospitalized in  [place] for supportive care  - Had prolonged course of symptoms/illness from covid  - At 10/2023 visit, pt was feeling close to her baseline  - Most recent PFTs, 6MWT, TTE were done in 3/2024  - Had been dealing with respiratory infections during 2024, which have mostly resolved/not recurred recently  - However, now dealing with BLE wound/ulcer infxns requiring courses of abx    Current Treatments:  - Mycophenolate mofetil liquid suspension 1000mg BID (2019-current) - recently has been on hold for a couple weeks at a time due to abx courses in the past couple months  - Ofev per pulm (4/2024-current)  - Tadalafil per pulm   - Rabeprazole 40mg daily per GI    Interval History:  Pt is here for follow up today, was last seen almost 3 months ago. The primary issue has been her lower extremity wounds and the infection issues, requiring a couple courses of abx. Reports doing well overall otherwise. No recent illness, cough, cold, or notable change in baseline symptoms. Pt has completed IV abx for the infxn and follows with ID, podiatry/wound care. She will also be seeing general surgery later this month regarding a groin cyst/bump. Pt denies any new skin changes, she feels her chronic skin changes have actually improved slightly. Rarely has acid reflux and sometimes solid foods have gotten stuck in the esophagus, but this has not occurred recently. Appetite has been very good and bowels moving regularly.     Review of Systems   Constitutional:  Negative for fever and unexpected weight change.   HENT:  Positive for trouble swallowing. Negative for mouth sores.    Eyes:  Negative for redness.   Respiratory:  Negative for cough and shortness of breath.    Cardiovascular:  Negative for chest pain.   Gastrointestinal:  Negative for constipation and diarrhea.   Genitourinary:  Negative for dysuria and genital sores.   Skin:  Negative for rash.   Neurological:  Negative for headaches.   Hematological:  Bruises/bleeds easily.       Objective:   There were no vitals taken for this visit.  Physical Exam   Constitutional: She is oriented to person, place, and time. She appears well-developed and well-nourished. No distress.   HENT:   Head: Normocephalic and atraumatic.   Right Ear: External ear normal.   Left Ear: External ear normal.   Pulmonary/Chest:   Pulmonary Comments: Velcro crackles present primarily at the lung bases, stable at baseline.  Musculoskeletal:      Cervical back: Normal range of motion and neck supple.      Comments: No visually apparent synovitis in the hands. There is limited ROM of the hands in setting of chronic scleroderma skin changes. 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    Neurological: She is alert and oriented to person, place, and time.   Skin: Skin is warm and dry.   MRSS 12 (from prior visit). There is sclerodactyly with chronic skin thickened changes. Fingertips are dry. No pitting ulcers.    Psychiatric: Her behavior is normal. Mood normal.   Vitals reviewed.    Limited exam in setting of virtual visit today.    Modified Skin Score: (from prior visit, unable to assess today)    Face= 0  Chest= 0  Abdomen=0         Right Upper Arm= 0     Left Upper Arm= 0  Right Lower Arm= 0     Left Lower Arm= 0  Right Hand= 0              Left Hand= 0  Right Fingers= 1          Left Fingers= 1  Right Upper Leg= 0     Left Upper Leg=0  Right Lower Leg= 1     Left Lower Leg= 1  Right Foot= 3               Left Foot= 3     TOTAL: 10 (improved from 12 at last visit, previously at 14)     Assessment:     1. Scleroderma    2. Immunosuppression due to drug therapy    3. Iron deficiency anemia, unspecified iron deficiency anemia type    4. Pulmonary hypertension associated with systemic disorder    5. Venous insufficiency of both lower extremities    6. WHO group 1 pulmonary arterial hypertension    7. ILD (interstitial lung disease)    8. Osteopenia of multiple sites      Plan:     Problem List Items Addressed This Visit        Pulmonary hypertension associated with systemic disorder (Chronic)    Venous insufficiency of both lower extremities    ILD (interstitial lung disease)    Osteopenia    Scleroderma - Primary    ROXANNE (iron deficiency anemia)    WHO group 1 pulmonary arterial hypertension    Immunosuppression due to drug therapy     Yamel Shah is a 73yo F with PMH of systemic sclerosis with interstitial lung disease, HFpEF, BLE venous insufficiency with chronic ulcers/wounds, s/p laser venous ablation (12/2020), GERD, Pagan's esophagus, s/p hernia repair (5/2023).    - Pt with established SSc with ILD and pulm HTN for many years  - Had previously been dealing with prolonged respiratory infections, previously in August-September 2023 and again in April 2024  - Lung functions have now returned to baseline after clearing these infectious issues and have continued to remain stable  - She follows regularly with pulmonology, Dr. Leigh as well as the PH clinic  - Echo, lung function studies, and vascular imaging have all been stable most recently  - Now with persistent infection/bacterial presence in chronic BLE wounds, which are very slowly improving/healing - has required a couple courses of systemic abx and now trying to get a topical abx  - Following with ID, podiatry/wound care    Plan:  - Resume/continue MMF 1000mg BID now that pt is off antibiotics, would hold when on systemic abx in the future  - Continue with ofev and tadalafil per pulm/PH teams and PPI per GI  - At this time, pt does not appear to require additional immunosuppressive agents  - If she does need further immune therapy in the future, could consider rituximab  - Last DEXA BMD 12/2024, next due around 12/2026  - Pt is up to date with vaccines and healthcare maintenance    Follow up here in clinic in about 3 months or earlier if needed.    Assessment and plan discussed with supervising attending, Dr. Ahuja.      Saima Bennett MD  PGY-5, Rheumatology

## 2025-05-06 ENCOUNTER — TELEPHONE (OUTPATIENT)
Dept: INTERNAL MEDICINE | Facility: CLINIC | Age: 75
End: 2025-05-06
Payer: MEDICARE

## 2025-05-06 NOTE — TELEPHONE ENCOUNTER
----- Message from Mirian sent at 5/6/2025  1:40 PM CDT -----  Contact: [phone]  Patient is returning a phone call.Who left a message for the patient: Anay Garcia MADoes patient know what this is regarding:  return callWould you like a call back, or a response through your [facility] portal?:   callComments: PLEASE call

## 2025-05-06 NOTE — TELEPHONE ENCOUNTER
Rcvd fax from [employer] asking  For ok on order for buccal swab.     What is this about?  Medicare fraud?    Left msg for patient to call me back.

## 2025-05-08 DIAGNOSIS — G89.4 CHRONIC PAIN DISORDER: ICD-10-CM

## 2025-05-09 ENCOUNTER — PATIENT MESSAGE (OUTPATIENT)
Dept: PODIATRY | Facility: CLINIC | Age: 75
End: 2025-05-09
Payer: MEDICARE

## 2025-05-12 RX ORDER — PREGABALIN 300 MG/1
300 CAPSULE ORAL 2 TIMES DAILY
Qty: 60 CAPSULE | Refills: 6 | Status: SHIPPED | OUTPATIENT
Start: 2025-05-12 | End: 2025-11-10

## 2025-05-12 NOTE — TELEPHONE ENCOUNTER
Patient requesting refill on pregabalin (LYRICA) 300 MG   Last office visit 02/04/25   shows last refill on 03/27/25  Patient does have a pain contract on file with [facility] department  Patient last UDS 02/04/25

## 2025-05-15 ENCOUNTER — TELEPHONE (OUTPATIENT)
Dept: TRANSPLANT | Facility: CLINIC | Age: 75
End: 2025-05-15
Payer: MEDICARE

## 2025-05-15 ENCOUNTER — EXTERNAL HOME HEALTH (OUTPATIENT)
Dept: HOME HEALTH SERVICES | Facility: HOSPITAL | Age: 75
End: 2025-05-15
Payer: MEDICARE

## 2025-05-15 ENCOUNTER — TELEPHONE (OUTPATIENT)
Dept: ORTHOPEDICS | Facility: CLINIC | Age: 75
End: 2025-05-15
Payer: MEDICARE

## 2025-05-15 ENCOUNTER — PATIENT MESSAGE (OUTPATIENT)
Dept: TRANSPLANT | Facility: CLINIC | Age: 75
End: 2025-05-15
Payer: MEDICARE

## 2025-05-15 ENCOUNTER — TELEPHONE (OUTPATIENT)
Dept: PODIATRY | Facility: CLINIC | Age: 75
End: 2025-05-15
Payer: MEDICARE

## 2025-05-15 DIAGNOSIS — J84.9 INTERSTITIAL PULMONARY DISEASE, UNSPECIFIED: Primary | ICD-10-CM

## 2025-05-15 NOTE — TELEPHONE ENCOUNTER
Appointments scheduled. [facility] message sent to patient.  ----- Message from Roberta Leigh DO sent at 5/15/2025 12:15 PM CDT -----  Regarding: RE: follow up question re: CT Chest  Sure we can definitely just do the CT chest with her visit.  Thanks  ----- Message -----  From: Johanna Fernandez  Sent: 5/14/2025   9:39 AM CDT  To: Roberta Leigh DO  Subject: follow up question re: CT Chest                  I have a note in my reminders for her to have an updated CT chest in three months. May would be three months.... last one in February 2025. She has foot wounds that are being managed by podiatry on cellcept and OFEV. Leigha is aware.Just wanted to follow up with you to see if you want a CT when she comes in a few weeks.Thank you.

## 2025-05-15 NOTE — TELEPHONE ENCOUNTER
Hello, this is a message to notify you regarding your xray appointment at [facility] 30 minutes prior to your appointment time on 5/19/25 with Dr. Williamson for x-rays.     Please arrive a little early to check in prior to your appointment approximately at 3:00pm.     Xray does run behind sometimes, we do appreciate your patience in advance. If you would like to get your xray before your appointment please reschedule your xray at a location near you at your convenience through the [facility].    *Please arrive at your informed time above, if you are more than 15 Minutes late to your appointment with Dr. Williamson we will have to reschedule your appointment. This will allow you to be seen in a timely manor and be conscious to other patients being seen that same day*     Please respond to this message to confirm you received this notification.

## 2025-05-17 PROCEDURE — G0179 MD RECERTIFICATION HHA PT: HCPCS | Mod: NTX,,, | Performed by: INTERNAL MEDICINE

## 2025-05-19 ENCOUNTER — HOSPITAL ENCOUNTER (OUTPATIENT)
Dept: RADIOLOGY | Facility: HOSPITAL | Age: 75
Discharge: HOME | End: 2025-05-19
Attending: ORTHOPAEDIC SURGERY
Payer: MEDICARE

## 2025-05-19 ENCOUNTER — OFFICE VISIT (OUTPATIENT)
Dept: ORTHOPEDICS | Facility: CLINIC | Age: 75
End: 2025-05-19
Payer: MEDICARE

## 2025-05-19 ENCOUNTER — TELEPHONE (OUTPATIENT)
Dept: TRANSPLANT | Facility: CLINIC | Age: 75
End: 2025-05-19
Payer: MEDICARE

## 2025-05-19 ENCOUNTER — OFFICE VISIT (OUTPATIENT)
Dept: INTERNAL MEDICINE | Facility: CLINIC | Age: 75
End: 2025-05-19
Payer: MEDICARE

## 2025-05-19 ENCOUNTER — TELEPHONE (OUTPATIENT)
Dept: PODIATRY | Facility: CLINIC | Age: 75
End: 2025-05-19
Payer: MEDICARE

## 2025-05-19 VITALS
HEIGHT: 65 IN | OXYGEN SATURATION: 98 % | SYSTOLIC BLOOD PRESSURE: 128 MMHG | HEART RATE: 90 BPM | TEMPERATURE: 99 F | DIASTOLIC BLOOD PRESSURE: 50 MMHG | RESPIRATION RATE: 18 BRPM | WEIGHT: 138.88 LBS | BODY MASS INDEX: 23.14 KG/M2

## 2025-05-19 DIAGNOSIS — G89.29 CHRONIC HAND PAIN, LEFT: ICD-10-CM

## 2025-05-19 DIAGNOSIS — M79.645 FINGER PAIN, LEFT: ICD-10-CM

## 2025-05-19 DIAGNOSIS — G89.29 CHRONIC HAND PAIN, RIGHT: ICD-10-CM

## 2025-05-19 DIAGNOSIS — M79.642 CHRONIC HAND PAIN, LEFT: Primary | ICD-10-CM

## 2025-05-19 DIAGNOSIS — M79.641 CHRONIC HAND PAIN, RIGHT: ICD-10-CM

## 2025-05-19 DIAGNOSIS — M19.241 OTHER SECONDARY OSTEOARTHRITIS OF BOTH HANDS: ICD-10-CM

## 2025-05-19 DIAGNOSIS — R05.8 PRODUCTIVE COUGH: Primary | ICD-10-CM

## 2025-05-19 DIAGNOSIS — R05.1 ACUTE COUGH: Primary | ICD-10-CM

## 2025-05-19 DIAGNOSIS — M34.9 SCLERODERMA WITH PULMONARY INVOLVEMENT: ICD-10-CM

## 2025-05-19 DIAGNOSIS — J84.9 ILD (INTERSTITIAL LUNG DISEASE): ICD-10-CM

## 2025-05-19 DIAGNOSIS — M24.541 CONTRACTURE OF RIGHT HAND: ICD-10-CM

## 2025-05-19 DIAGNOSIS — M34.9 SCLERODERMA: ICD-10-CM

## 2025-05-19 DIAGNOSIS — M19.242 OTHER SECONDARY OSTEOARTHRITIS OF BOTH HANDS: ICD-10-CM

## 2025-05-19 DIAGNOSIS — G89.29 CHRONIC HAND PAIN, LEFT: Primary | ICD-10-CM

## 2025-05-19 DIAGNOSIS — R06.02 SHORTNESS OF BREATH: ICD-10-CM

## 2025-05-19 DIAGNOSIS — M24.542 CONTRACTURE OF LEFT HAND: ICD-10-CM

## 2025-05-19 DIAGNOSIS — M79.642 CHRONIC HAND PAIN, LEFT: ICD-10-CM

## 2025-05-19 DIAGNOSIS — S69.92XA INJURY OF FINGER, LEFT, INITIAL ENCOUNTER: ICD-10-CM

## 2025-05-19 DIAGNOSIS — M79.644 FINGER PAIN, RIGHT: ICD-10-CM

## 2025-05-19 PROCEDURE — 99215 OFFICE O/P EST HI 40 MIN: CPT | Mod: PBBFAC,25,27 | Performed by: ORTHOPAEDIC SURGERY

## 2025-05-19 PROCEDURE — 99214 OFFICE O/P EST MOD 30 MIN: CPT | Mod: S$PBB,,, | Performed by: NURSE PRACTITIONER

## 2025-05-19 PROCEDURE — 73140 X-RAY EXAM OF FINGER(S): CPT | Mod: 26,LT,TXP, | Performed by: RADIOLOGY

## 2025-05-19 PROCEDURE — 99999 PR PBB SHADOW E&M-EST. PATIENT-LVL V: CPT | Mod: PBBFAC,,, | Performed by: NURSE PRACTITIONER

## 2025-05-19 PROCEDURE — 73130 X-RAY EXAM OF HAND: CPT | Mod: 26,50,TXP, | Performed by: RADIOLOGY

## 2025-05-19 PROCEDURE — 73140 X-RAY EXAM OF FINGER(S): CPT | Mod: TC,LT,TXP

## 2025-05-19 PROCEDURE — 73130 X-RAY EXAM OF HAND: CPT | Mod: TC,50,TXP

## 2025-05-19 PROCEDURE — 99215 OFFICE O/P EST HI 40 MIN: CPT | Mod: PBBFAC,PO,25 | Performed by: NURSE PRACTITIONER

## 2025-05-19 PROCEDURE — 99999 PR PBB SHADOW E&M-EST. PATIENT-LVL V: CPT | Mod: PBBFAC,TXP,, | Performed by: ORTHOPAEDIC SURGERY

## 2025-05-19 PROCEDURE — 99205 OFFICE O/P NEW HI 60 MIN: CPT | Mod: S$PBB,TXP,, | Performed by: ORTHOPAEDIC SURGERY

## 2025-05-19 RX ORDER — PROMETHAZINE HYDROCHLORIDE AND CODEINE PHOSPHATE 6.25; 1 MG/5ML; MG/5ML
5 SOLUTION ORAL EVERY 8 HOURS PRN
Qty: 118 ML | Refills: 0 | Status: SHIPPED | OUTPATIENT
Start: 2025-05-19 | End: 2025-05-19 | Stop reason: SDUPTHER

## 2025-05-19 RX ORDER — LEVOFLOXACIN 25 MG/ML
500 SOLUTION ORAL DAILY
Qty: 140 ML | Refills: 0 | Status: SHIPPED | OUTPATIENT
Start: 2025-05-19 | End: 2025-05-27

## 2025-05-19 RX ORDER — PROMETHAZINE HYDROCHLORIDE AND CODEINE PHOSPHATE 6.25; 1 MG/5ML; MG/5ML
5 SOLUTION ORAL EVERY 8 HOURS PRN
Qty: 118 ML | Refills: 0 | Status: SHIPPED | OUTPATIENT
Start: 2025-05-19 | End: 2025-05-29

## 2025-05-19 NOTE — TELEPHONE ENCOUNTER
----- Message from Aaron sent at 5/19/2025  9:26 AM CDT -----  Regarding: Returning call for Nahed  Contact: Pt [phone]  Type: Returning a callWho left a message? Eric did the practice call? TodayDoes patient know what this is regarding: RescheduleWould the patient rather a call back or a response via [facility]? CallComments:

## 2025-05-19 NOTE — TELEPHONE ENCOUNTER
----- Message from Tomasa sent at 5/19/2025  9:02 AM CDT -----  Regarding: Reschedule Appt  Contact: [phone]  RESCHEDULE APPTSCurrent Appt Date:Type of Appt: Myrnaan:  Ashlie for Scheduling:  Pt was ill and could not make her appt.  Pt's  is requesting her to be rescheduled for this thurs  05/22/2025.  Thank you.  Caller:  Lewis (Spouse)  Contact Preference:  [phone] or

## 2025-05-19 NOTE — TELEPHONE ENCOUNTER
Called patient's  to advise him that Dr Nava is going to be in surgery Thursday May 22 and there is no availability, there was no answer.

## 2025-05-19 NOTE — TELEPHONE ENCOUNTER
Spoke with patient who is requesting to reschedule her appointment due to illness - ok by Dr Nava to schedule Thursday May 22 at 11:15 am for wound care.

## 2025-05-19 NOTE — PROGRESS NOTES
[facility]  History & Physical  Orthopedics    SUBJECTIVE:      Chief Complaint:   Chief Complaint   Patient presents with    Left Hand - Pain       Referring Provider: Self, Aaareferral     Patient is a 74 y.o. right hand dominant female who presents today with complaints of bilateral hand contracture and new injury to left ring finger 5/12/25.  History of Present Illness    HPI:  Patient presents for evaluation of her hands, particularly focusing on a recent injury to the left ring finger. The pain began last Monday when she slipped on water at a store and tried to grab the door frame to break her fall. The pain is rated as 7/10 in severity, with difficulty using this finger and avoidance of its use. She denies any open wounds from the fall but notes swelling and redness in the affected area.    She has a long-standing history of scleroderma, diagnosed in 1983, which has significantly affected hand function. Past issues with wounds on knuckles have since healed, with the last occurrence over 10-15 years ago. Currently, there are limitations in hand function, particularly in grasping and pinching, with a modified  primarily using the thumb and index finger for tasks.    She mentions ongoing foot issues related to scleroderma, with active wounds on both feet currently being treated. She recently been on IV antibiotics via a PICC line for these foot wounds, which was removed just before a trip to [place].    Recent respiratory symptoms include congestion and a cough producing red or brown phlegm. The nurse prescribed Levofloxacin and promethazine for these symptoms. She is scheduled to see her pulmonologist, Dr. Shapley, next Thursday for follow-up on her lung condition, which is noted to have fibrous calcifications at the lower lobes due to scleroderma.    She denies any current wounds on her hand knuckles.    PREVIOUS TREATMENTS:  - Patient has used various splints over the years for her  hands, which may have helped with healing some wounds on her knuckles.  - Patient has been using ice on her recently injured left ring finger, which has provided some relief.    WORK STATUS:  - Retired 5 years ago, worked as stenographer  - Prior to [employer], patient worked despite having wounds on hands, wrapping them to continue working         Vitals:    05/19/25 1604   PainSc:   7   PainLoc: Hand     The patient denies any fevers, chills, N/V, D/C and presents for evaluation.    Past Medical History:   Diagnosis Date    Abnormal Pap smear     Acid reflux     Allergy     Arthritis     Encounter for blood transfusion     GERD (gastroesophageal reflux disease)     Hiatal hernia 03/24/2023    History of migraine headaches     Hx of colonic polyp     Hypertension     Idiopathic neuropathy 07/20/2012    ILD (interstitial lung disease) 11/06/2013    Iron deficiency anemia 03/18/2014    MRSA carrier     Osteopenia     Pneumonia     Pulmonary fibrosis     Pulmonary hypertension     Raynaud's disease     Scleroderma, diffuse     Sjogren's syndrome     Vitamin D deficiency 11/14/2013     Past Surgical History:   Procedure Laterality Date    24 HOUR IMPEDANCE PH MONITORING OF ESOPHAGUS IN PATIENT NOT TAKING ACID REDUCING MEDICATIONS N/A 03/04/2020    Procedure: IMPEDANCE PH STUDY, ESOPHAGEAL, 24 HOUR, IN PATIENT NOT TAKING ACID REDUCING MEDICATION;  Surgeon: Annamaria Mendoza MD;  Location: [facility] (4TH FLR);  Service: Endoscopy;  Laterality: N/A;  OFF PPI/H2 Blocker   Motility Studies   Hold Narcotics x 1 days   Hold TCA x 1 days  2/26 - LVM attempting to confirm appt  2/27 - Confirmed appt    ANORECTAL MANOMETRY N/A 05/10/2021    Procedure: MANOMETRY, ANORECTAL with balloon expulsion test;  Surgeon: Annamaria Mendoza MD;  Location: [facility] (4TH FLR);  Service: Endoscopy;  Laterality: N/A;  order combined  covid test 5/7 Mandaeism, instructions emailed-KPvt    BREAST BIOPSY      Left, benign    BRONCHOSCOPY N/A 10/2/2023     Procedure: bronch;  Surgeon: Roberta Leigh DO;  Location: [facility] OR 2ND FLR;  Service: Endoscopy;  Laterality: N/A;    BRONCHOSCOPY N/A 9/16/2024    Procedure: Flexible bronchoscopy (BAL only);  Surgeon: Roberta Leigh DO;  Location: [facility] OR 2ND FLR;  Service: Endoscopy;  Laterality: N/A;    CERVICAL CONIZATION   W/ LASER  1970    COLONOSCOPY      COLONOSCOPY N/A 03/29/2019    Procedure: COLONOSCOPY;  Surgeon: Annamaria Mendoza MD;  Location: [facility] (2ND FLR);  Service: Endoscopy;  Laterality: N/A;    COLONOSCOPY N/A 05/10/2021    Procedure: COLONOSCOPY;  Surgeon: Annamaria Mendoza MD;  Location: [facility] (4TH FLR);  Service: Endoscopy;  Laterality: N/A;  2nd floor-previous scopes done on 2nd floor, gastroparesis  full liquid diet x2 days, clear liquid x1 day prior to procedure  covid test 5/7 Yarsani, instructions emailed-[facility] [place]  5/6 pt confirmed appt-[facility] [place]    DILATION AND CURETTAGE OF UTERUS      ESOPHAGEAL MANOMETRY WITH MEASUREMENT OF IMPEDANCE N/A 03/04/2020    Procedure: MANOMETRY, ESOPHAGUS, WITH IMPEDANCE MEASUREMENT;  Surgeon: Annamaria Mendoza MD;  Location: [facility] (4TH FLR);  Service: Endoscopy;  Laterality: N/A;  OFF PPI/H2 Blocker   Motility Studies   Hold Narcotics x 1 days   Hold TCA x 1 days    ESOPHAGOGASTRODUODENOSCOPY      ESOPHAGOGASTRODUODENOSCOPY N/A 03/29/2019    Procedure: EGD (ESOPHAGOGASTRODUODENOSCOPY);  Surgeon: Annamaria Mendoza MD;  Location: [facility] (2ND FLR);  Service: Endoscopy;  Laterality: N/A;  pulmonary htn    ESOPHAGOGASTRODUODENOSCOPY N/A 02/02/2021    Procedure: ESOPHAGOGASTRODUODENOSCOPY (EGD);  Surgeon: Annamaria Mendoza MD;  Location: [facility] (2ND FLR);  Service: Endoscopy;  Laterality: N/A;  2nd floor gastroparesis  3 days full liquid diet and 1 day clears  covid test 1/30 primary care, instructions sent to [facility]    ESOPHAGOGASTRODUODENOSCOPY N/A 07/01/2022    Procedure: ESOPHAGOGASTRODUODENOSCOPY (EGD);  Surgeon: Annamaria Mendoza MD;   Location: [facility] ENDO (2ND FLR);  Service: Endoscopy;  Laterality: N/A;  2nd floor-gastroparesis  full liquid diet x3 days, clear liquid diet x1 day prior to procedure  fully vaccinated, instructions sent to [facility]  6/29-pt confirmed new arrival time-Kpvt    EXCISION, LESION, STOMACH, LAPAROSCOPIC N/A 03/23/2023    Procedure: XI ROBOTIC EXCISION, LESION, STOMACH;  Surgeon: Katherine Segura MD;  [address]  Service: General;  Laterality: N/A;    EXCISIONAL BIOPSY, LYMPH NODE Right 05/26/2023    Procedure: EXCISIONAL BIOPSY, LYMPH NODE, INGUINAL;  Surgeon: Katherine Segura MD;  Location: [address];  Service: General;  Laterality: Right;    HYSTERECTOMY  1990    FRANDY (AUB, Fibroids), ovaries remain    REPAIR, HERNIA, UMBILICAL N/A 03/23/2023    Procedure: REPAIR, HERNIA, UMBILICAL;  Surgeon: Katherine Segura MD;  [address];  Service: General;  Laterality: N/A;    RIGHT HEART CATHETERIZATION Right 01/05/2021    Procedure: INSERTION, CATHETER, RIGHT HEART;  Surgeon: Aureliano Sy MD;  Location: [facility] CATH LAB;  Service: Cardiology;  Laterality: Right;    RIGHT HEART CATHETERIZATION Right 03/16/2023    Procedure: INSERTION, CATHETER, RIGHT HEART;  Surgeon: Aureliano Sy MD;  Location: [facility] CATH LAB;  Service: Cardiology;  Laterality: Right;    RIGHT HEART CATHETERIZATION Right 11/22/2024    Procedure: INSERTION, CATHETER, RIGHT HEART;  Surgeon: Tamanna Martins MD;  Location: [facility] CATH LAB;  Service: Cardiology;  Laterality: Right;    ROBOT-ASSISTED LAPAROSCOPIC REPAIR OF INGUINAL HERNIA USING DA VERNELL XI Right 05/26/2023    Procedure: XI ROBOTIC REPAIR, HERNIA, INGUINAL, FEMORAL;  Surgeon: Katherine Segura MD;  Location: [address];  Service: General;  Laterality: Right;    ROBOT-ASSISTED REPAIR OF HIATAL HERNIA USING DA VERNELL XI N/A 03/23/2023    Procedure: XI ROBOTIC REPAIR, HERNIA, HIATAL;  Surgeon: Katherine Singh  MD Imelda;  Location: [facility] OR 2ND FLR;  Service: General;  Laterality: N/A;    VARICOSE VEIN SURGERY      XI ROBOTIC GASTROPEXY N/A 03/23/2023    Procedure: XI ROBOTIC GASTROPEXY;  Surgeon: Katherine Segura MD;  Location: [facility] OR 2ND FLR;  Service: General;  Laterality: N/A;    XI ROBOTIC PYLOROMYOTOMY N/A 03/23/2023    Procedure: XI ROBOTIC PYLOROMYOTOMY;  Surgeon: Katherine Segura MD;  Location: [facility] OR [facility] FLR;  Service: General;  Laterality: N/A;     Review of patient's allergies indicates:   Allergen Reactions    Sulfamethizole     Sulfamethoxazole      Other Reaction(s): Tramadol    rash    Spironolactone Tinitus    Sulfa (sulfonamide antibiotics) Rash     Other reaction(s): Rash    Other reaction(s): Rash   Other reaction(s): Rash    Tramadol Itching and Rash     Social History     Social History Narrative         Family History   Problem Relation Name Age of Onset    Breast cancer Mother Tiki Singh     Cancer Mother Tiki Singh         Breast    Hypertension Father Madi     Diabetes Father Madi         Amputation of legs    Breast cancer Sister Brenda     Diabetes Sister Brenda     Arthritis Sister Brenda     Cancer Sister Brenda         Breast    Osteoarthritis Brother Luis     Diabetes Brother Luis     Arthritis Brother Luis     Birth defects Brother Luis         Polio at birth, recently had knee replacement surgery on left knee    No Known Problems Daughter      No Known Problems Daughter      No Known Problems Son      No Known Problems Son      Breast cancer Maternal Aunt Gege     Cancer Maternal Aunt Gege         Breast    Early death Sister Philomena     Melanoma Neg Hx      Colon cancer Neg Hx      Crohn's disease Neg Hx      Stomach cancer Neg Hx      Ulcerative colitis Neg Hx      Rectal cancer Neg Hx      Irritable bowel syndrome Neg Hx      Esophageal cancer Neg Hx      Celiac disease Neg Hx      Ovarian cancer Neg Hx      Liver cancer Neg Hx      Pancreatic  cancer Neg Hx         Current Medications[1]    ROS    Review of Systems:  Constitutional: no fever or chills  Eyes: no visual changes  ENT: no nasal congestion or sore throat  Respiratory: no cough or shortness of breath  Cardiovascular: no chest pain  Gastrointestinal: no nausea or vomiting, tolerating diet  Musculoskeletal: pain, soreness, and decreased ROM    OBJECTIVE:      Vital Signs (Most Recent):  There were no vitals filed for this visit.  There is no height or weight on file to calculate BMI.    Physical Exam    Physical Exam:  Constitutional: The patient appears well-developed and well-nourished. No distress.   Head: Normocephalic and atraumatic.   Nose: Nose normal.   Eyes: Conjunctivae and EOM are normal.   Neck: No tracheal deviation present.   Cardiovascular: Normal rate and intact distal pulses.    Pulmonary/Chest: Effort normal. No respiratory distress.   Abdominal: There is no guarding.   Lymphatic: Negative for adenopathy   Neurological: The patient is alert.   Psychiatric: The patient has a normal mood and affect.     Bilateral Hand/Wrist Examination:  Observation/Inspection:  Swelling  none    Deformity  bilateral hand and digits  Erythema  none     Scars   none    Atrophy  none  Synovitis  none    HAND/WRIST EXAMINATION:  Physical Exam    MSK: Elbow - Left: Full elbow range of motion.  MSK: Hand/Wrist - Left: Wrist flexion limited to 45 degrees. Wrist extension limited to 20 degrees. Full forearm rotation. No IP motion in left small finger. Long finger fixed at PIP joint. Normal sensation.  MSK: Hand/Wrist - Right: Forearm rotation limited by 30 degrees.  Skin: Moist skin between fingers. Thin skin at joint junction.  IMAGING:  - XR Hands: Wrist joints almost autofused, carpal bones grown together as a unit  - XR Hands: Few knuckles almost dislocated due to contracture  - XR from 2020: Long finger's metacarpal wallowed into its base  - Current XR: Bones in the wrist grown together, patient  still has good wrist range of motion       LUE: Changes of scleroderma, Left ring PIP joint swelling , no DIP motion left small finger, left long finger DIP joint no motion, left index finger PIP joint auto fused at 45 degrees with intact flexor tendon, Digits 1-5 unable to flex fingers within 2 cm of palm    RUE: Changes of scleroderma, Right small finger flexor tendon intact contracture of MP 90 degrees PIPJ 90 degress and IP joint subluxation, right index MP joints Digits 2-5 resting flexed posture PIP and DIP joint and flexor contracture,     Neurovascular Exam:  Digits WWP, brisk CR < 3s throughout  NVI motor/LTS to M/R/U nerves, radial pulse 2+  2+ biceps and brachioradialis reflexes    Diagnostic studies and other clinical records review:  X-rays AP, lateral and oblique bilateral hand taken today are independently reviewed by me and shows carpal consolidation bilateral wrists with autofused bones, ulnar deviation left long MCP, subluxation of left ring middle phalanx at PIP joint.     ASSESSMENT/PLAN:    74 y.o. yo female with   Encounter Diagnoses   Name Primary?    Chronic hand pain, left Yes    Chronic hand pain, right     Finger pain, left     Contracture of right hand     Contracture of left hand     Injury of finger, left, initial encounter     Scleroderma     Other secondary osteoarthritis of both hands       The patient and I had a thorough discussion today. We discussed the working diagnosis as well as several other potential alternative diagnoses. Treatment options were discussed, both conservative and surgical. Conservative treatment options would include things such as activity modifications, workplace modifications, a period of rest, oral vs topical OTC and prescription anti-inflammatory medications, occupational therapy, splinting/bracing, immobilization, corticosteroid injections, and others. Surgical options were discussed as well. I have recommended a Occupational therapy for evaluation and  custom orthosis.  Assessment & Plan    PROCEDURES:  - Z-lengthening procedure for the left ring finger to release and lengthen the tendon, improving function and preventing wound formation. Discussed expected outcomes.  - Considered possibility of fusing the right index finger joint to improve alignment, noting potential healing concerns.  - Mentioned advanced prosthetic options as potential future intervention if hand function significantly declines, emphasizing this is not currently necessary.    FOLLOW UP:  - Follow up in 2-3 weeks.    PATIENT INSTRUCTIONS:  - Use foam or cotton gauze between fingers to absorb moisture and prevent skin breakdown.  - Protect left ring finger from further trauma.        Labs: CBC, CRP ordered today  OT: custom orthosis fabrication: Right ring finger gutter splint, Right hand foam and adjustment of extension splint.  Follow up in 3 weeks   Call with any questions/concerns in the interim    The patient's pathophysiology was explained in detail with reference to x-rays, models, other visual aids as appropriate.  Treatment options were discussed in detail.  Questions were invited and answered to the patient's satisfaction. I reviewed Primary care , and other specialty's notes to better coordinate patient's care.          Mandy Williamson MD    Please be aware that this note has been generated with the assistance of [employer] voice-to-text.  Please excuse any spelling or grammatical errors.  This note was generated with the assistance of ambient listening technology. Verbal consent was obtained by the patient and accompanying visitor(s) for the recording of patient appointment to facilitate this note. I attest to having reviewed and edited the generated note for accuracy, though some syntax or spelling errors may persist. Please contact the author of this note for any clarification.           [1]   Current Outpatient Medications:     0.9 % sodium chloride (SODIUM CHLORIDE 0.9%) 0.9 % flush,  10 mL 3 TIMES DAILY (route: injection), Disp: , Rfl:     acetaminophen-codeine 300-60mg (TYLENOL #4) 300-60 mg Tab, 1 tablet BEDTIME (route: oral), Disp: , Rfl:     acetaminophen-codeine 300-60mg (TYLENOL #4) 300-60 mg Tab, Take 1 tablet by mouth every evening., Disp: 45 tablet, Rfl: 0    albuterol (VENTOLIN HFA) 90 mcg/actuation inhaler, Inhale 2 puffs into the lungs every 6 (six) hours as needed for Wheezing. Rescue, Disp: 8.5 g, Rfl: 11    ammonium lactate 12 % Crea, Apply 20 g topically once daily., Disp: 770 g, Rfl: 2    colistimethate (COLYMYCIN) 150 mg injection, , Disp: , Rfl:     DULoxetine (CYMBALTA) 60 MG capsule, Take 1 capsule (60 mg total) by mouth 2 (two) times daily., Disp: 60 capsule, Rfl: 11    ergocalciferol (ERGOCALCIFEROL) 50,000 unit Cap, Take 1 capsule (50,000 Units total) by mouth every 7 days., Disp: 12 capsule, Rfl: 3    furosemide (LASIX) 20 MG tablet, Take 1 tablet (20 mg total) by mouth once daily., Disp: 30 tablet, Rfl: 5    furosemide (LASIX) 20 MG tablet, Take 20 mg by mouth., Disp: , Rfl:     heparin sodium,porcine (HEPARIN LOCK FLUSH IV), , Disp: , Rfl:     meropenem (MERREM) 1 gram injection, , Disp: , Rfl:     multivitamin capsule, Take 1 capsule by mouth once daily., Disp: , Rfl:     mycophenolate mofetil (CELLCEPT) 200 mg/mL SusR, Take 5 mLs (1,000 mg total) by mouth 2 (two) times daily., Disp: 480 mL, Rfl: 1    nabumetone (RELAFEN) 750 MG tablet, Take 1 tablet (750 mg total) by mouth daily as needed for Pain., Disp: 30 tablet, Rfl: 3    nabumetone (RELAFEN) 750 MG tablet, Take 750 mg by mouth., Disp: , Rfl:     nebulizer and compressor Rosemary, Use as directed, Disp: 1 each, Rfl: 0    NIFEdipine (PROCARDIA-XL) 30 MG (OSM) 24 hr tablet, Take 30 mg by mouth., Disp: , Rfl:     NIFEdipine (PROCARDIA-XL) 90 MG (OSM) 24 hr tablet, Take 90 mg by mouth., Disp: , Rfl:     OFEV 100 mg Cap, TAKE 1 CAPSULE BY MOUTH 2 TIMES A DAY, Disp: 60 capsule, Rfl: 11    pravastatin (PRAVACHOL) 20 MG  tablet, Take 1 tablet (20 mg total) by mouth every evening., Disp: 90 tablet, Rfl: 3    pregabalin (LYRICA) 300 MG Cap, 1 capsule 2 TIMES DAILY (route: oral), Disp: , Rfl:     pregabalin (LYRICA) 300 MG Cap, Take 1 capsule (300 mg total) by mouth 2 (two) times daily., Disp: 60 capsule, Rfl: 6    PREVIDENT 5000 BOOSTER PLUS 1.1 % Pste, SMARTSIG:To Teeth, Disp: , Rfl:     RABEprazole (ACIPHEX) 20 mg tablet, Take 20 mg by mouth 2 (two) times daily., Disp: , Rfl:     RABEprazole (ACIPHEX) 20 mg tablet, Take 1 tablet (20 mg total) by mouth 2 (two) times daily., Disp: 60 tablet, Rfl: 11     KIT, STERILE MISC, USE 10 ML FOR MEDICATION ADMINISTRATION WITH SPRAY BOTTLE- DIRECTIONS FOR USE (SK), #2 30ML SPRAY BOTTLES, #1 FUNNEL, SALINE 5ML VIALS #300ML, Disp: , Rfl:     tadalafil (ADCIRCA) 20 mg Tab, Take 2 tablets (40 mg total) by mouth once daily., Disp: 60 tablet, Rfl: 11    tiZANidine (ZANAFLEX) 4 MG tablet, Take 2 tablets (8 mg total) by mouth 2 (two) times daily as needed (muscle pain)., Disp: 60 tablet, Rfl: 3    tiZANidine 4 mg Cap, 2 tablet 2 TIMES DAILY (route: oral), Disp: , Rfl:     TOBRAMYCIN SULFATE, BULK, MISC, MIX 2 GRAMS OF POWDER WITH DILUENT. APPLY TO AFFECTED AREAS. PERFORM ONCE DAILY., Disp: , Rfl:     TOBRAMYCIN, BULK, MISC, MIX 2 GRAMS OF POWDER WITH DILUENT. APPLY TO AFFECTED AREAS. PERFORM ONCE DAILY., Disp: , Rfl:   No current facility-administered medications for this visit.    Facility-Administered Medications Ordered in Other Visits:     fentaNYL 50 mcg/mL injection 25 mcg, 25 mcg, Intravenous, Q5 Min PRN, Patodia, Khadijah, DO    haloperidol lactate injection 0.5 mg, 0.5 mg, Intravenous, Q10 Min PRN, Patodia, Khadijah, DO    sodium chloride 0.9% flush 10 mL, 10 mL, Intravenous, PRN, Patodia, Khadijah, DO

## 2025-05-19 NOTE — TELEPHONE ENCOUNTER
Order placed per Dr. Leigh request, patient is aware, will drop off specimen on 5/20. Request to  to book drop off appt.

## 2025-05-19 NOTE — PROGRESS NOTES
Subjective:       Patient ID: Yamel Shah is a 74 y.o. female.    Chief Complaint: Respiratory Distress (1 week got worst Thursday 05/15/25 )      History of Present Illness    CHIEF COMPLAINT:  Ms. Shah presents today with worsening respiratory symptoms and chest congestion.    HISTORY OF PRESENT ILLNESS:  She reports onset of respiratory symptoms last Tuesday evening with chest congestion, wheezing, and breathing difficulties. She is producing brownish-reddish phlegm. Her inhaler has provided some relief for wheezing but has not resolved symptoms completely. She has a nebulizer at home.    MEDICAL HISTORY:  She has a history of scleroderma and interstitial lung disease. She is unable to take medications containing DM.    CURRENT MEDICATIONS:  She takes Cellcept which has been beneficial for skin symptoms. She temporarily discontinued Cellcept when she became ill and is awaiting completion of antibiotic course before resuming medication.    WOUND CARE:  She has a foot ulcer being treated. She is using an antibiotic spray prescribed by her podiatrist, applied during wound care.          Review of patient's allergies indicates:   Allergen Reactions    Sulfamethizole     Sulfamethoxazole      Other Reaction(s): Tramadol    rash    Spironolactone Tinitus    Sulfa (sulfonamide antibiotics) Rash     Other reaction(s): Rash    Other reaction(s): Rash   Other reaction(s): Rash    Tramadol Itching and Rash       Medication List with Changes/Refills   New Medications    LEVOFLOXACIN (LEVAQUIN) 250 MG/10 ML SOLN    Take 20 mLs (500 mg total) by mouth once daily. for 7 days    PROMETHAZINE-CODEINE 6.25-10 MG/5 ML (PHENERGAN WITH CODEINE) 6.25-10 MG/5 ML SYRUP    Take 5 mLs by mouth every 8 (eight) hours as needed for Cough.   Current Medications    0.9 % SODIUM CHLORIDE (SODIUM CHLORIDE 0.9%) 0.9 % FLUSH    10 mL 3 TIMES DAILY (route: injection)    ACETAMINOPHEN-CODEINE 300-60MG (TYLENOL #4) 300-60 MG TAB     Take 1 tablet by mouth every evening.    ACETAMINOPHEN-CODEINE 300-60MG (TYLENOL #4) 300-60 MG TAB    1 tablet BEDTIME (route: oral)    ALBUTEROL (VENTOLIN HFA) 90 MCG/ACTUATION INHALER    Inhale 2 puffs into the lungs every 6 (six) hours as needed for Wheezing. Rescue    AMMONIUM LACTATE 12 % CREA    Apply 20 g topically once daily.    DULOXETINE (CYMBALTA) 60 MG CAPSULE    Take 1 capsule (60 mg total) by mouth 2 (two) times daily.    ERGOCALCIFEROL (ERGOCALCIFEROL) 50,000 UNIT CAP    Take 1 capsule (50,000 Units total) by mouth every 7 days.    FUROSEMIDE (LASIX) 20 MG TABLET    Take 1 tablet (20 mg total) by mouth once daily.    FUROSEMIDE (LASIX) 20 MG TABLET    Take 20 mg by mouth.    HEPARIN SODIUM,PORCINE (HEPARIN LOCK FLUSH IV)    3-5 mL 3 TIMES DAILY (route: intravenous)    MEROPENEM (MERREM) 1 GRAM INJECTION        MULTIVITAMIN CAPSULE    Take 1 capsule by mouth once daily.    MYCOPHENOLATE MOFETIL (CELLCEPT) 200 MG/ML SUSR    Take 5 mLs (1,000 mg total) by mouth 2 (two) times daily.    NABUMETONE (RELAFEN) 750 MG TABLET    Take 1 tablet (750 mg total) by mouth daily as needed for Pain.    NABUMETONE (RELAFEN) 750 MG TABLET    Take 750 mg by mouth.    NEBULIZER AND COMPRESSOR EDUARDO    Use as directed    NIFEDIPINE (PROCARDIA-XL) 90 MG (OSM) 24 HR TABLET    Take 90 mg by mouth.    OFEV 100 MG CAP    TAKE 1 CAPSULE BY MOUTH 2 TIMES A DAY    PRAVASTATIN (PRAVACHOL) 20 MG TABLET    Take 1 tablet (20 mg total) by mouth every evening.    PREGABALIN (LYRICA) 300 MG CAP    1 capsule 2 TIMES DAILY (route: oral)    PREGABALIN (LYRICA) 300 MG CAP    Take 1 capsule (300 mg total) by mouth 2 (two) times daily.    PREVIDENT 5000 BOOSTER PLUS 1.1 % PSTE    SMARTSIG:To Teeth    RABEPRAZOLE (ACIPHEX) 20 MG TABLET    Take 1 tablet (20 mg total) by mouth 2 (two) times daily.    RABEPRAZOLE (ACIPHEX) 20 MG TABLET    Take 20 mg by mouth 2 (two) times daily.    TADALAFIL (ADCIRCA) 20 MG TAB    Take 2 tablets (40 mg total) by  "mouth once daily.    TADALAFIL (CIALIS) 20 MG TAB    Take 20 mg by mouth.    TIZANIDINE (ZANAFLEX) 4 MG TABLET    Take 2 tablets (8 mg total) by mouth 2 (two) times daily as needed (muscle pain).    TIZANIDINE 4 MG CAP    2 tablet 2 TIMES DAILY (route: oral)     Review of Systems   Constitutional:  Positive for malaise/fatigue.   HENT:  Positive for congestion.    Respiratory:  Positive for cough, sputum production, shortness of breath and wheezing.       Objective:   BP (!) 128/50 (BP Location: Left arm, Patient Position: Sitting)   Pulse 90   Temp 98.7 °F (37.1 °C) (Temporal)   Resp 18   Ht 5' 5" (1.651 m)   Wt 63 kg (138 lb 14.2 oz)   SpO2 98%   BMI 23.11 kg/m²     Physical Exam  Vitals reviewed.   Constitutional:       Appearance: Normal appearance.   HENT:      Head: Normocephalic and atraumatic.      Right Ear: Tympanic membrane normal.      Left Ear: Tympanic membrane normal.      Nose: Rhinorrhea present. Rhinorrhea is clear.      Mouth/Throat:      Pharynx: Posterior oropharyngeal erythema present.   Cardiovascular:      Rate and Rhythm: Normal rate and regular rhythm.      Heart sounds: Normal heart sounds. No murmur heard.  Pulmonary:      Effort: Pulmonary effort is normal.      Breath sounds: Rhonchi present. No wheezing.   Skin:     General: Skin is warm and dry.   Neurological:      Mental Status: She is alert and oriented to person, place, and time.       Assessment and Plan:     Assessment & Plan    Assessed history of scleroderma presenting with persistent respiratory symptoms including chest congestion, wheezing, and productive cough with brownish-reddish phlegm.  Diagnosed upper respiratory infection, complicated by immunocompromised state due to underlying scleroderma.  Initiated antibiotic therapy due to prolonged symptoms and compromised immune status.  Started Levaquin oral solution 10 mL twice daily for 7 days, chosen based on preference and previous prescriptions from Dr. Leigh, " noting its broader coverage and patient preference.    1. Acute cough (Primary)    - levoFLOXacin (LEVAQUIN) 250 mg/10 mL Soln; Take 20 mLs (500 mg total) by mouth once daily. for 7 days  Dispense: 140 mL; Refill: 0  - promethazine-codeine 6.25-10 mg/5 ml (PHENERGAN WITH CODEINE) 6.25-10 mg/5 mL syrup; Take 5 mLs by mouth every 8 (eight) hours as needed for Cough.  Dispense: 118 mL; Refill: 0    2. Scleroderma with pulmonary involvement  3. ILD (interstitial lung disease)    Keep follow up with Dr. Leigh     - levoFLOXacin (LEVAQUIN) 250 mg/10 mL Soln; Take 20 mLs (500 mg total) by mouth once daily. for 7 days  Dispense: 140 mL; Refill: 0      Ms. Shah instructed to temporarily discontinue Cellcept until antibiotic course is completed, and to notify Dr. Bass (rheumatologist) about this change.          This note was generated with the assistance of ambient listening technology. Verbal consent was obtained by the patient and accompanying visitor(s) for the recording of patient appointment to facilitate this note. I attest to having reviewed and edited the generated note for accuracy, though some syntax or spelling errors may persist. Please contact the author of this note for any clarification.

## 2025-05-20 DIAGNOSIS — G89.4 CHRONIC PAIN DISORDER: Primary | ICD-10-CM

## 2025-05-21 ENCOUNTER — CLINICAL SUPPORT (OUTPATIENT)
Dept: REHABILITATION | Facility: HOSPITAL | Age: 75
End: 2025-05-21
Attending: ORTHOPAEDIC SURGERY
Payer: MEDICARE

## 2025-05-21 DIAGNOSIS — M24.542 CONTRACTURE OF LEFT HAND: ICD-10-CM

## 2025-05-21 DIAGNOSIS — M24.541 CONTRACTURE OF RIGHT HAND: ICD-10-CM

## 2025-05-21 DIAGNOSIS — S69.92XA INJURY OF FINGER, LEFT, INITIAL ENCOUNTER: ICD-10-CM

## 2025-05-21 PROCEDURE — 97763 ORTHC/PROSTC MGMT SBSQ ENC: CPT

## 2025-05-21 PROCEDURE — L3933 FO W/O JOINTS CF: HCPCS

## 2025-05-21 RX ORDER — ACETAMINOPHEN AND CODEINE PHOSPHATE 300; 60 MG/1; MG/1
1 TABLET ORAL NIGHTLY
Qty: 30 TABLET | Refills: 0 | Status: SHIPPED | OUTPATIENT
Start: 2025-05-21 | End: 2025-06-21

## 2025-05-21 NOTE — PROGRESS NOTES
Occupational Therapy Orthotic Note    Patient: Yamel Shah  MRN: [id number]  Date of visit: 5/21/2025  Referring Physician:  Mandy Williamson MD   Primary Diagnosis: Hand contractures   Treatment Diagnosis:   Encounter Diagnoses   Name Primary?    Contracture of right hand     Contracture of left hand     Injury of finger, left, initial encounter          Subjective:     Patient is a 74 y.o. right hand dominant female who presents today with complaints of bilateral hand contracture and new injury to left ring finger 5/12/25. MD requested adjustment to current left resting hand orthosis to improve comfort and fit (patient agreed) and right middle finger foam spacer to avoid skin breakdown (patient refused).    Adjusted left resting hand orthosis to improve comfort and fit; cut length and adjusted fingertip trough, and raised small finger trough.  Fabricated dorsal finger ash for left ring finger for day use to provide support over PIP to avoid skin breakdown and hitting with use. Applied with coban and finger sleeve underneath. Padded for comfort and fit; provided clinic info;      Patient refused right  middle finger foam spacer to avoid skin breakdown which MD requested; encouraged patient to consider right middle finger spacer to avoid skin breakdown.     Objective:     Patient seen by OT this session for adjustment to previously fabricated left resting hand orthosis to improve comfort and fit. Therapist cut length and adjusted fingertip trough and raised small fingertip trough to improve digit extension.  Custom fabricated dorsal finger ash (FO L 3933) for left ring finger for day use to provide support over PIP to avoid skin breakdown and hitting of finger with use.  Applied with coban and finger sleeve underneath.  Padded for comfort and fit.     Patient refused right middle finger foam spacer recommended by MD to avoid skin breakdown.  Encouraged patient to reconsider and contact clinic if she  changes her mind and we can fabricate at another session.      Assessment:     Orthosis with good fit following fabrication and adjustment. Pt. Able to chanel/doff independently.      Patient Education/Response:     Orthotic Fit and Training, subsequent session: 15 minutes:     Pt. Instructed to wear nightly or as needed for pain, rest.  OK to  remove for bathing or hygiene. Patient was provided  instructions on orthosis purpose, wear schedule, care and precautions to monitor for increased pain/edema, pressure points, skin breakdown or redness/skin irritation     Patient to contact clinic for adjustments as needed.  Encouraged patient to reconsider right foam finger spacer to open palm for hygiene purpose and to avoid skin breakdown as recommended by MD.      Plans and Goals:     Patient to follow up as necessary for adjustments for fit.     Goal of Orthosis:  After instruction, the patient will demonstrate proper donning/doffing of splint, splint care, precautions, understanding of wearing schedule to insure correct follow through with home care program.     ES Perez, CHT

## 2025-05-22 ENCOUNTER — LAB VISIT (OUTPATIENT)
Dept: LAB | Facility: HOSPITAL | Age: 75
End: 2025-05-22
Attending: INTERNAL MEDICINE
Payer: MEDICARE

## 2025-05-22 ENCOUNTER — OFFICE VISIT (OUTPATIENT)
Dept: PODIATRY | Facility: CLINIC | Age: 75
End: 2025-05-22
Payer: MEDICARE

## 2025-05-22 ENCOUNTER — TELEPHONE (OUTPATIENT)
Dept: PODIATRY | Facility: CLINIC | Age: 75
End: 2025-05-22
Payer: MEDICARE

## 2025-05-22 VITALS
HEIGHT: 65 IN | DIASTOLIC BLOOD PRESSURE: 66 MMHG | HEART RATE: 96 BPM | SYSTOLIC BLOOD PRESSURE: 135 MMHG | BODY MASS INDEX: 23.11 KG/M2

## 2025-05-22 DIAGNOSIS — M34.9 SCLERODERMA WITH PULMONARY INVOLVEMENT: ICD-10-CM

## 2025-05-22 DIAGNOSIS — M34.9 SCLERODERMA: ICD-10-CM

## 2025-05-22 DIAGNOSIS — R06.02 SHORTNESS OF BREATH: ICD-10-CM

## 2025-05-22 DIAGNOSIS — R05.8 PRODUCTIVE COUGH: ICD-10-CM

## 2025-05-22 DIAGNOSIS — L97.912 ULCERS OF BOTH LOWER EXTREMITIES WITH FAT LAYER EXPOSED: Primary | ICD-10-CM

## 2025-05-22 DIAGNOSIS — G60.9 IDIOPATHIC NEUROPATHY: ICD-10-CM

## 2025-05-22 DIAGNOSIS — L97.922 ULCERS OF BOTH LOWER EXTREMITIES WITH FAT LAYER EXPOSED: Primary | ICD-10-CM

## 2025-05-22 PROCEDURE — 99214 OFFICE O/P EST MOD 30 MIN: CPT | Mod: S$PBB,,, | Performed by: PODIATRIST

## 2025-05-22 PROCEDURE — 99999 PR PBB SHADOW E&M-EST. PATIENT-LVL V: CPT | Mod: PBBFAC,,, | Performed by: PODIATRIST

## 2025-05-22 PROCEDURE — 87205 SMEAR GRAM STAIN: CPT | Mod: TXP

## 2025-05-22 PROCEDURE — 99215 OFFICE O/P EST HI 40 MIN: CPT | Mod: PBBFAC,PN | Performed by: PODIATRIST

## 2025-05-22 RX ORDER — COLISTIMETHATE SODIUM 150 MG/1
INJECTION, POWDER, LYOPHILIZED, FOR SOLUTION INTRAMUSCULAR; INTRAVENOUS
COMMUNITY
Start: 2025-05-02

## 2025-05-22 NOTE — PROGRESS NOTES
Subjective:     Patient    Yamel Shah is a 74 y.o. female. Presents today with .   Pt is new to me.     Problem    2024: Previously seen by multiple wound care providers without significant improvement in wounds, has bounced around a lot. Has bilateral foot wounds related to scleroderma and immunosuppressant medications. Had improvement in granulation with regular wound care, oral and topical antibiotics, one debridement. Attempted topical vitamin E but caused burning.     01/03/25: Returns for bilateral foot wound care. No new concerns. Had pain flare on left foot, still sensitive.     01/08/25: Returns for bilateral foot wound care. No new concerns.     01/16/25: Returns for bilateral foot wound care. Awaiting appointment with ID 02/14. No new concerns.     01/24/25: Returns for bilateral foot wound care. Pain controlled on current medications. No new concerns.     01/31/25: Returns for bilateral foot wound care. Not tolerating amitriptyline well; still with nerve pain, tingling, cramping, burning despite duloxetine, tolerates duloxetine well.     02/07/25: Returns for bilateral foot wound care. Increased duloxetine at last visit for BLE nerve pain with significant improvement in pain without side effects. ID appointment rescheduled to 02/24.     02/14/25: Returns for bilateral foot wound care. Wants to restart PO antibiotics until she sees ID, thinks she was progressing better at that time.     02/21/25: Returns for bilateral foot wound care. Wounds seem somewhat better on PO antibiotics, ID appointment next week.     02/25/25: Returns for bilateral foot wound care. Completed recent PO antibiotics, recent cultures + for Pseudomonas resistant to PO antibiotics; has first ID appointment tomorrow.     03/07/25: Returns for bilateral foot wound care. Getting OPAT per ID soon. Pain OK.     03/12/25: Returns for bilateral foot wound care. Will start antibiotics within next week. Pain OK  except severe at left 2nd toe dorsally. Wants debridement of left 2nd toe today.     03/19/25: Returns for bilateral foot wound care. On IV antibiotics. No new concerns.     03/27/25: Returns for bilateral foot wound care. Still on IV antibiotics. No new concerns.     04/04/25: Returns for bilateral foot wound care. Stopped IV antibiotics but still has the line in. Going on vacation for 2 weeks soon.     04/25/25: Returns for bilateral foot wound care. Newer cultures with polymicrobial growth, ID not concerned. Does not feel wounds significantly improved with IV antibiotics.     05/01/25: Returns for bilateral foot wound care. Awaiting topical antibiotics from [facility].      5/22/25  Pt presents today.. new to me.  Presents with  who provides a lot a history   Seeing as courtesy as Dr. Nava unavailable this week    They discussed there returned from their [place] trip  PICC line has been pulled prior to trip    Patient developed a lung infection and is on medicine and improving  - levoFLOXacin (LEVAQUIN)   - promethazine-codeine 6.25-10 mg/5 ml     Using the foot antibiotic spray  Patient is wrapping the foot when home health is not available   Patient denies fever chills nausea vomiting  Patient wearing the surgical toe shoes      Past Medical History:   Diagnosis Date    Abnormal Pap smear     Acid reflux     Allergy     Arthritis     Encounter for blood transfusion     GERD (gastroesophageal reflux disease)     Hiatal hernia 03/24/2023    History of migraine headaches     Hx of colonic polyp     Hypertension     Idiopathic neuropathy 07/20/2012    ILD (interstitial lung disease) 11/06/2013    Iron deficiency anemia 03/18/2014    MRSA carrier     Osteopenia     Pneumonia     Pulmonary fibrosis     Pulmonary hypertension     Raynaud's disease     Scleroderma, diffuse     Sjogren's syndrome     Vitamin D deficiency 11/14/2013       Past Surgical History:   Procedure Laterality Date    24 HOUR  IMPEDANCE PH MONITORING OF ESOPHAGUS IN PATIENT NOT TAKING ACID REDUCING MEDICATIONS N/A 03/04/2020    Procedure: IMPEDANCE PH STUDY, ESOPHAGEAL, 24 HOUR, IN PATIENT NOT TAKING ACID REDUCING MEDICATION;  Surgeon: Annamaria Mendoza MD;  Location: [facility] (4TH FLR);  Service: Endoscopy;  Laterality: N/A;  OFF PPI/H2 Blocker   Motility Studies   Hold Narcotics x 1 days   Hold TCA x 1 days  2/26 - LVM attempting to confirm appt  2/27 - Confirmed appt    ANORECTAL MANOMETRY N/A 05/10/2021    Procedure: MANOMETRY, ANORECTAL with balloon expulsion test;  Surgeon: Annamaria Mendoza MD;  Location: [facility] ENDO (4TH FLR);  Service: Endoscopy;  Laterality: N/A;  order combined  covid test 5/7 [facility], instructions emailed-[facility]    BREAST BIOPSY      Left, benign    BRONCHOSCOPY N/A 10/2/2023    Procedure: bronch;  Surgeon: Roberta Leigh DO;  [facility];  Service: Endoscopy;  Laterality: N/A;    BRONCHOSCOPY N/A 9/16/2024    Procedure: Flexible bronchoscopy (BAL only);  Surgeon: Roberta Leigh DO;  Location: [facility] OR 2ND FLR;  Service: Endoscopy;  Laterality: N/A;    CERVICAL CONIZATION   W/ LASER  1970    COLONOSCOPY      COLONOSCOPY N/A 03/29/2019    Procedure: COLONOSCOPY;  Surgeon: Annamaria Mendoza MD;  Location: [facility] (2ND FLR);  Service: Endoscopy;  Laterality: N/A;    COLONOSCOPY N/A 05/10/2021    Procedure: COLONOSCOPY;  Surgeon: Annamaria Mendoza MD;  Location: [facility] (4TH FLR);  Service: Endoscopy;  Laterality: N/A;  2nd floor-previous scopes done on 2nd floor, gastroparesis  full liquid diet x2 days, clear liquid x1 day prior to procedure  covid test 5/7 [employer], instructions emailed-[facility]  5/6 pt confirmed appt-[facility]    DILATION AND CURETTAGE OF UTERUS      ESOPHAGEAL MANOMETRY WITH MEASUREMENT OF IMPEDANCE N/A 03/04/2020    Procedure: MANOMETRY, ESOPHAGUS, WITH IMPEDANCE MEASUREMENT;  Surgeon: Annamaria Mendoza MD;  Location: [facility] ENDO (4TH FLR);  Service: Endoscopy;  Laterality: N/A;   OFF PPI/H2 Blocker   Motility Studies   Hold Narcotics x 1 days   Hold TCA x 1 days    ESOPHAGOGASTRODUODENOSCOPY      ESOPHAGOGASTRODUODENOSCOPY N/A 03/29/2019    Procedure: EGD (ESOPHAGOGASTRODUODENOSCOPY);  Surgeon: Annamaria Mendoza MD;  [facility]  Service: Endoscopy;  Laterality: N/A;  pulmonary htn    ESOPHAGOGASTRODUODENOSCOPY N/A 02/02/2021    Procedure: ESOPHAGOGASTRODUODENOSCOPY (EGD);  Surgeon: Annamaria Mendoza MD;  [facility]  Service: Endoscopy;  Laterality: N/A;  2nd floor gastroparesis  3 days full liquid diet and 1 day clears  covid test 1/30 primary care, instructions sent to [facility]    ESOPHAGOGASTRODUODENOSCOPY N/A 07/01/2022    Procedure: ESOPHAGOGASTRODUODENOSCOPY (EGD);  Surgeon: Annamaria Mendoza MD;  [facility]  Service: Endoscopy;  Laterality: N/A;  2nd floor-gastroparesis  full liquid diet x3 days, clear liquid diet x1 day prior to procedure  fully vaccinated, instructions sent to [facility]  6/29-pt confirmed new arrival time-[facility]    EXCISION, LESION, STOMACH, LAPAROSCOPIC N/A 03/23/2023    Procedure: XI ROBOTIC EXCISION, LESION, STOMACH;  Surgeon: Katherine Segura MD;  Location: [address];  Service: General;  Laterality: N/A;    EXCISIONAL BIOPSY, LYMPH NODE Right 05/26/2023    Procedure: EXCISIONAL BIOPSY, LYMPH NODE, INGUINAL;  Surgeon: Katherine Segura MD;  Location: [facility] OR [address] [place] [address];  Service: General;  Laterality: Right;    HYSTERECTOMY  1990    FRANDY (AUB, Fibroids), ovaries remain    REPAIR, HERNIA, UMBILICAL N/A 03/23/2023    Procedure: REPAIR, HERNIA, UMBILICAL;  Surgeon: Katherine Segura MD;  Location: [address];  Service: General;  Laterality: N/A;    RIGHT HEART CATHETERIZATION Right 01/05/2021    Procedure: INSERTION, CATHETER, RIGHT HEART;  Surgeon: Aureliano Sy MD;  Location: [facility] CATH LAB;  Service: Cardiology;  Laterality: Right;    RIGHT HEART  CATHETERIZATION Right 03/16/2023    Procedure: INSERTION, CATHETER, RIGHT HEART;  Surgeon: Aureliano Sy MD;  Location: [facility] CATH LAB;  Service: Cardiology;  Laterality: Right;    RIGHT HEART CATHETERIZATION Right 11/22/2024    Procedure: INSERTION, CATHETER, RIGHT HEART;  Surgeon: Tamanna Martins MD;  Location: [facility] CATH LAB;  Service: Cardiology;  Laterality: Right;    ROBOT-ASSISTED LAPAROSCOPIC REPAIR OF INGUINAL HERNIA USING DA VERNELL XI Right 05/26/2023    Procedure: XI ROBOTIC REPAIR, HERNIA, INGUINAL, FEMORAL;  Surgeon: Katherine Segura MD;  Location: [facility] OR [address] [place] [address];  Service: General;  Laterality: Right;    ROBOT-ASSISTED REPAIR OF HIATAL HERNIA USING DA VERNELL XI N/A 03/23/2023    Procedure: XI ROBOTIC REPAIR, HERNIA, HIATAL;  Surgeon: Katherine Segura MD;  [facility];  Service: General;  Laterality: N/A;    VARICOSE VEIN SURGERY      XI ROBOTIC GASTROPEXY N/A 03/23/2023    Procedure: XI ROBOTIC GASTROPEXY;  Surgeon: Katherine Segura MD;  Location: [facility] OR [address] [place] [address];  Service: General;  Laterality: N/A;    XI ROBOTIC PYLOROMYOTOMY N/A 03/23/2023    Procedure: XI ROBOTIC PYLOROMYOTOMY;  Surgeon: Katherine Segura MD;  [facility];  Service: General;  Laterality: N/A;        Objective:     Vitals  Wt Readings from Last 1 Encounters:   05/19/25 63 kg (138 lb 14.2 oz)     Temp Readings from Last 1 Encounters:   05/19/25 98.7 °F (37.1 °C) (Temporal)     BP Readings from Last 1 Encounters:   05/22/25 135/66     Pulse Readings from Last 1 Encounters:   05/22/25 96       Dermatological Exam    Skin:  Skin of both feet thickened (scleroderma), stiff, with extensive ulcerations of both feet down to fat with slowly improving granulation; chronic low level infection, xerosis        Vascular Exam    Arteries:  Posterior tibial artery palpable on right  Dorsalis pedis artery palpable on right  Posterior tibial artery palpable on left  Dorsalis pedis  artery palpable on left    Veins:  Superficial veins unremarkable on right  Superficial veins unremarkable on left    Swellin+ nonpitting on right  1+ nonpitting on left    Neurological Exam    [place] touch test:  6/6 sites sensed, light touch intact     Musculoskeletal Exam   + pain to palpations     Gait Exam:    Surgical shoes                                     Assessment:     Encounter Diagnoses   Name Primary?    Ulcers of both lower extremities with fat layer exposed Yes    Idiopathic neuropathy     Scleroderma                Plan:     I counseled the patient on her conditions, their implications and medical management.       Chart review:       -HH dressing changes.   -Protected WB in surgical shoes.    New shoes dispensed     - no Debridement  Dressings: Hydrofera Blue ready applied to wounds  Aquacel Ag rope applied to interspaces  Covered by ABD pads, marilyn, and compression sleeve ( light)     Follow-up:Patient is to return to the clinic in 1 week  for follow-up but should call [facility] immediately if any signs of infection, such as fever, chills, sweats, increased redness or pain.    Short-term goals include maintaining good offloading and minimizing bioburden, promoting granulation and epithelialization to healing.  Long-term goals include keeping the wound healed by good offloading and medical management under the direction of internist.       Consider hyperbaric oxygen chamber unsure patient would qualify    Also encourgaed limiting travel due to extensive wounds  F/u with Dr. Nava 1 week     Over 45 minute spent with patient care

## 2025-05-23 ENCOUNTER — RESULTS FOLLOW-UP (OUTPATIENT)
Dept: TRANSPLANT | Facility: CLINIC | Age: 75
End: 2025-05-23

## 2025-05-26 ENCOUNTER — HOSPITAL ENCOUNTER (OUTPATIENT)
Dept: RADIOLOGY | Facility: OTHER | Age: 75
Discharge: HOME | End: 2025-05-26
Attending: INTERNAL MEDICINE
Payer: MEDICARE

## 2025-05-26 ENCOUNTER — OFFICE VISIT (OUTPATIENT)
Dept: PAIN MEDICINE | Facility: CLINIC | Age: 75
End: 2025-05-26
Payer: MEDICARE

## 2025-05-26 VITALS
SYSTOLIC BLOOD PRESSURE: 125 MMHG | WEIGHT: 140.44 LBS | OXYGEN SATURATION: 95 % | HEIGHT: 65 IN | HEART RATE: 71 BPM | BODY MASS INDEX: 23.4 KG/M2 | TEMPERATURE: 98 F | RESPIRATION RATE: 18 BRPM | DIASTOLIC BLOOD PRESSURE: 59 MMHG

## 2025-05-26 DIAGNOSIS — M79.671 CHRONIC PAIN OF BOTH FEET: ICD-10-CM

## 2025-05-26 DIAGNOSIS — J84.9 INTERSTITIAL PULMONARY DISEASE, UNSPECIFIED: ICD-10-CM

## 2025-05-26 DIAGNOSIS — M79.18 MYOFASCIAL PAIN: ICD-10-CM

## 2025-05-26 DIAGNOSIS — G89.4 CHRONIC PAIN DISORDER: Primary | ICD-10-CM

## 2025-05-26 DIAGNOSIS — G89.29 CHRONIC PAIN OF BOTH FEET: ICD-10-CM

## 2025-05-26 DIAGNOSIS — Z79.891 ENCOUNTER FOR MONITORING OPIOID MAINTENANCE THERAPY: ICD-10-CM

## 2025-05-26 DIAGNOSIS — M79.672 CHRONIC PAIN OF BOTH FEET: ICD-10-CM

## 2025-05-26 DIAGNOSIS — Z51.81 ENCOUNTER FOR MONITORING OPIOID MAINTENANCE THERAPY: ICD-10-CM

## 2025-05-26 LAB
BACTERIA SPT CF RESP CULT: ABNORMAL
GRAM STN SPEC: ABNORMAL

## 2025-05-26 PROCEDURE — 71250 CT THORAX DX C-: CPT | Mod: 26,NTX,, | Performed by: STUDENT IN AN ORGANIZED HEALTH CARE EDUCATION/TRAINING PROGRAM

## 2025-05-26 PROCEDURE — 99999 PR PBB SHADOW E&M-EST. PATIENT-LVL V: CPT | Mod: PBBFAC,,, | Performed by: ANESTHESIOLOGY

## 2025-05-26 PROCEDURE — G2211 COMPLEX E/M VISIT ADD ON: HCPCS | Mod: S$PBB,,, | Performed by: ANESTHESIOLOGY

## 2025-05-26 PROCEDURE — 99214 OFFICE O/P EST MOD 30 MIN: CPT | Mod: S$PBB,GC,, | Performed by: ANESTHESIOLOGY

## 2025-05-26 PROCEDURE — 99215 OFFICE O/P EST HI 40 MIN: CPT | Mod: PBBFAC,25 | Performed by: ANESTHESIOLOGY

## 2025-05-26 PROCEDURE — 71250 CT THORAX DX C-: CPT | Mod: TC,TXP

## 2025-05-26 NOTE — PROGRESS NOTES
Chronic Pain - Follow Up Established Patient visit      Interval History 5/26/2025:  Patient presenting for follow-up of feet pain secondary to chronic wounds. She reports ongoing pain despite being on five different pain medications, which are working in unique ways. For foot wounds, she is seeing podiatry weekly for wound care, with additional home health visits 3 times per week. She reports wounds are getting smaller and healing, with new pink tissue forming. A couple of months ago, Dr. Nava (Podiatry) identified bacteria in the wound through a culture. After unsuccessful treatment with medication, she was referred to infectious disease and had a PICC line placed for about two months with antibiotics, followed by a compound antibiotic for about two weeks. Recently, Dr. Loredo changed the treatment approach to using dry pads instead of ointment, which she felt was drying up the wound. She did have a PICC line removed prior to trip to [place] and antibiotics were administered in a different manner.     She has carpal tunnel and saw Dr. Alvarado two weeks ago for hand XRs. Dr. Alvarado noted concern about the right hand, stating it digs into the inside of the hand and is moist. She has a history of scleroderma, which complicates the healing process.    She expresses concern about taking multiple medications, questioning the appropriate amount and concerned about time that she is on these medications. Current pain medication regimen has been in place for over a year.     Interval History 2/4/2025:  The patient returns to clinic today for follow up of foot pain. She continues to have wounds to both feet. She is seeing Podiatry weekly for this. She continues to report bilateral foot pain. She also notes worsened restless leg symptoms. She recently increased Cymbalta to BID with benefit. She is also taking Lyrica, Relafen, and Zanaflex. She does take Tylenol #4 with benefit. She denies any adverse effects. She denies any  other health changes. Her pain today is 6/10.    Interval History 11/5/2024:  The patient returns to clinic today for follow up of foot pain. She is having worsening foot pain. She now has non-healing ulcers on both feet. She is following with Podiatry for wound care. She was started of Cymbalta which has been helpful. She also takes Lyrica, Relafen, and Zanaflex. She also takes Tylenol #4 as needed with benefit. She denies any adverse effects. She denies any other health changes. Her pain today is 6/10.    Interval History 7/30/2024:  The patient returns to clinic today for follow up of foot pain. She continues to report bilateral foot pain. She continues to have bilateral wounds to her feet. She does have worsened pain with certain shoes. She is currently taking Lyrica, Relafen, and Zanaflex with benefit. She also takes Tylenol #4 with benefit. She denies any adverse effects. She denies any other health changes. Her pain today is 6/10.    Interval History 4/29/2024:  The patient returns to clinic today for follow up of foot pain. She continues to report bilateral foot pain. Pain was more significant this weekend with swelling (R>L), denies inciting incident. Reported 7/10 pain. Improved with ace wrap on leg and increasing dose of tylenol #3 and tizanidine. Current pain 6/10. She is currently taking Lyrica, Relafen, Zanaflex and Tylenol #3 as needed with benefit. She denies any other health changes. Patient denies red flags including weakness, unexpected weight loss/gain, night sweats/fevers, saddle anesthesia, and symptoms of UBALDO.    Interval History 1/26/2024:  The patient returns to clinic today for follow up of foot pain. She continues to report bilateral foot pain. This pain is worse at night. She does sometimes wake up due to pain. She continues to have open wounds, currently on her ankles. She is currently taking Lyrica, Relafen, and Zanaflex. She also takes Tylenol #3 as needed with benefit. She denies any  other health changes. Her pain today is 5/10.    Interval History 10/27/2023:  The patient returns to clinic today for follow up of foot pain. She continues to report bilateral foot pain. She describes this pain as burning and stinging in nature. She now has sores under her ankles. She is having trouble laying on her sides at night due to pain. The sores by her toes are healing. She continues to report benefit with home medication regimen. She is taking Lyrica, Zanaflex, and Relafen. She also takes Tylenol #3 with benefit. She is out of this medication. She denies any adverse effects. She denies any other health changes. Her pain today is 5/10.     Interval History 7/28/2023:  The patient returns to clinic today for follow up of neck and foot pain via virtual visit. She continues to report bilateral foot pain. This is worse at night, described as burning in nature. She does report some new ulcers to her feet. She does have a home wound care regimen. She continues to report intermittent neck pain. She is taking Lyrica, Zanaflex, and Relafen with benefit. She also takes Tylenol #3 as needed for severe pain with benefit and without adverse effects. She denies any other health changes.     Interval History 4/28/2023:  The patient returns to clinic today for follow up of neck and foot pain. Since last visit, she has had pneumonia. She also had hiatal hernia repair. She continues to report bilateral foot pain. This is burning in nature. Her pain is worse at night. She reports intermittent neck pain. She continues to take Lyrica, Zanaflex, and Relafen with benefit. She also takes Tylenol #3 for severe pain. She denies any adverse effects. She denies any other health changes. Her pain today is 4/10.    Interval History 12/30/2022:  The patient returns to clinic today for follow up of neck and foot pain. She reports increased foot pain over the last 2 months. She continues to report ulcers to her feet. She reports bilateral  foot pain. Her pain is worse at night. She reports intermittent neck pain. She is taking Lyrica, Zanaflex, and Relafen with benefit. She also takes Tylenol #3 with benefit. She denies any adverse effects. She denies any other health changes. Her pain today is 6/10.    Interval History 8/26/2022:  The patient returns to clinic today for follow up neck and foot pain. She reports increased pain over the last few days. She attributes this as she is out of Lyrica. She continues to report bilateral foot pain. She continues to have wounds. She continues to perform wound care. She continues to report intermittent neck pain. She continues to take Lyrica, Zanaflex, and Relafen with benefit. She continues to take Tylenol #3 for severe pain as needed with benefit. She denies any adverse effects. She denies any other health changes. Her pain today is 4/10.    Interval History 5/26/2022:  The patient returns to clinic today for follow up of neck and foot pain via virtual visit. She continues to report bilateral foot pain. She continues to report ulcers to her feet. She continues to report neck pain with intermittent radiating pain into her arms. She continues to report benefit with current medications. She is taking Lyrica, Zanaflex, and Relafen. She also takes Tylenol #3 with benefit. She denies any adverse effects with these medications. She continues to perform a home exercise routine. She denies any other health changes.     Interval History 2/15/22  Patient presents in follow up. Was previously Dr. King patient with primary complaints of neck and foot pain. She reports her pain has been stable since her last visit. She did have covid in January and stopped all of ehr pain medications. She got an antibody infusion. She has fully recovered. She restarted her pain medications and reports that they are effective. She is taking Tylenol 3 daily, nambumetome 750 mg BID, lyrica 150 mg BID and Zanaflex 4 mg TID. She did do PT for  her neck November to January per her report. She continues with mild neck pain without radiation into the arms or hands. Pain is worse with sidebending and rotation to the right and better with rest and medications. She denies any numbness tingling weakness or b/b incontinence.    Interval History 1/4/2022:  The patient returns to clinic today for follow up of foot pain via virtual visit. She continues to report foot pain and wounds. She continues to follow up with podiatry and wound care. She reports increased neck pain. This pain is tight and aching in nature. She denies any radicular arm pain. She is currently taking Relafen, Lyrica, and Tylenol #3 with benefit and without adverse effects. She reports limited benefit with Zanaflex. She denies any other health changes.     Interval History 12/8/2021:  The patient returns to clinic today for foot pain via virtual visit. She continues to report foot pain. She does have foot wounds. She recently saw Dr. Claudio in [facility]. He does not recommend any vascular interventions at this time. She continues to follow up with Podiatry and wound care. She continues to take Zanaflex, Relafen, Lyrica and Tylenol #3 with benefit. She denies any adverse effects. She denies any other health changes. Her pain today is 7/10.    Interval History 11/8/2021:  The patient returns to clinic today for follow up of foot pain via virtual visit. At last visit, she was starting on Tylenol #3. She does find benefit with this. She sometimes breaks this in half. She continues to take Zanaflex, Relafen, and Lyrica. She continues to report bilateral foot pain and ulcers. She continues to follow up with podiatry. Her pain is worse at night. She denies any other health changes. Her pain today is 7/10.    Interval History 10/20/2021:  The patient returns to clinic today for follow up of foot pain. At last visit, she was started on Tramadol. She did have relief but had significant side effects. She  experienced sedation, itching, headaches, and decreased appetite. This has resolved after stopping the medication. She continues to report foot pain. This pain is worse at night. She continues to follow up with podiatry. She continues to take Tizanidine, Relafen, and Lyrica with some benefit. She denies any adverse effects. She denies any other health changes. Her pain today is 8/10.    Interval history 10/06/2021:  Since previous encounter the patient continues to have nonhealing wound has been followed up with wound care and is scheduled to follow with Rheumatology for the wounds in her legs she was referred to podiatry with stated that they have done nothing different me that she with doing on her own.  She continues to have neck pain and bilateral foot pain.  She has been taking tizanidine Relafen Tylenol p.m. and Lyrica 600 mg per day.  She states that all these medications are helpful.    Interval History 6/3/2021:  The patient returns to clinic today for follow up of foot pain. She continues to report left foot pain secondary to ulcer. She is seeing Wound Care. She describes this pain as burning in nature. Her pain is worse with prolonged activity. She reports intermittent neck pain. She continues to take Zanaflex. She reports limited benefit with increased Lyrica dose. She denies any other health changes. Her pain today is 5/10.    Interval History 5/5/2021:  The patient returns to clinic today for follow up of foot pain. She reports a new ulcer on her left foot. She is seeing Wound Care. She reports increased pain with this new ulcer. She describes this pain as burning. She continues to report neck pain that is tight and aching. She denies any radicular arm pain. Her pain is worse with prolonged activity, especially turning her head to the side. She continues to perform a home exercise routine. She continues to take Lyrica and Zanaflex with benefit. She denies any other health changes. Her pain today is  5/10.    Interval History 2/8/2021:  The patient returns to clinic today for follow up of neck and foot pain. She reports that her neck pain has significantly improved since last visit. She has recently completed physical therapy for this. She is performing a home stretching routine. She reports intermittent neck pain that is tight and aching in nature. She denies any radicular arm pain. She continues to report bilateral foot pain. This is worse at night. She continues to take Lyrica and Zanaflex with benefit. She denies any other health changes. Her pain today is 4/10.    Interval History 1/8/2021:  The patient returns to clinic today for follow up of neck and foot pain. She continues to report neck pain that is tight and aching in nature. She denies any radicular pain. She continues to participate in physical therapy and a home exercise routine. She continues to report bilateral foot pain, worse at night. She reports that increased Lyrica dose has provided improved benefit. She also takes Zanaflex with benefit. She denies any other health changes. Her pain today is 3/10.    Interval History 11/13/2020:  The patient returns to clinic today for follow up of neck and foot pain. She continues to report bilateral foot pain. Since last visit, she had a venous ablation procedure on her right leg through Vascular. She is scheduled for the left side in the next month. She continues to report bilateral foot pain, worse at night. She continues to report neck pain that is tight and aching in nature. She denies any radicular pain. Her pain is worse flexion and turning her head to the side. She is currently participating in physical therapy with some benefit. She continues to take Lyrica but does ask if this could be increased. She continues to take Zanaflex with benefit. She denies any other health changes. Her pain today is 5/10    Interval History 10/2/2020:  The patient returns to clinic today for follow up of foot pain.  She reports increased bilateral foot pain over the last few months. This pain is burning in nature. This pain is worse at night. She continues to have ulcers to her feet related to her scleroderma. She would like to restart the Lyrica. She also reports increased neck pain that is sore and aching in nature. She denies any radiating arm pain. This is worse with turning her head and at night. She denies any other health changes. Her pain today is 7/10.    Interval History 6/5/2020:  The patient requests audio visit today for follow up of bilateral foot pain. She reports intermittent foot pain that is tolerable at this time. She has discontinued Lyrica as of April. She continues to have ulcers to her feet. She does have wound care. She denies any other health changes.     Interval History 1/17/2020:  The patient returns to clinic today for follow up. She continues to report bilateral foot pain. She describes this pain as burning and tingling in nature. At last visit, we decreased her Lyrica dose to 100 mg at night. She reports increased pain since then. She would like to go to back to the 150 mg dose. She continues to have ulcers to her feet, left worse than right. She continues to participate in a home wound care program. She denies any other health changes. Her pain today is 6/10.    Interval History 12/17/2019:  The patient returns to clinic today for follow up. She reports improving foot pain. She reports improved healing ulcers to her left foot. She continues to report right foot pain that is burning and tingling in nature. She continues to participate in a home wound care routine. She continues to take Lyrica. She asks about decreasing this. She denies any other health changes. Her pain today is 5/10.    Interval History 9/17/2019:  The patient returns to clinic today for follow up. She continues to report bilateral foot pain that is burning and tingling in nature. Her pain is worse with sitting and at night. She  continues to have slow healing ulcers to both feet. She continues to perform a home wound care program. She is no longer taking Gabapentin which was previously called in. She is now taking Pregabalin generic with benefit. She denies any other health changes. Her pain today is 4/10.    Interval History 6/13/2019:  The patient returns to clinic for follow up for bilateral foot pain. She continues to report bilateral foot pain that is burning in nature. She continues to have slow healing wounds to both feet from ulcers. She continues to take Lyrica once daily but reports increased pain. She continues to take Vitamin B12. She denies any other health changes. Her pain today is 8/10.    Interval History 3/13/2019:  The patient returns to clinic today for follow up. She continues to report bilateral foot pain that is burning and tingling in nature. Her pain is worse with prolonged walking and standing. She continues to have bilateral foot wounds. She reports that these wounds have significantly improved since last visit. She is currently taking Vitamin B12 with benefit. She continues to report benefit with Lyrica. She is currently taking this once daily. She denies any other health changes. Her pain today is 5/10.    Interval History 2/6/2019:  The patient returns to clinic today for follow up. She reports that her bilateral foot wounds have significantly improved since last visit. She does report some significant improvement in her foot pain. She reports intermittent bilateral foot pain especially with prolonged walking. She describes this pain as heavy and tingling in nature. She is no longer taking Celebrex. She is currently taking Lyrica 150 mg BID with benefit. She does report that the Lyrica is expensive for her. She denies any other health changes. Her pain today is 5/10.    Interval History 12/5/18:  Patient returns to clinic today for follow up. She reports that since increasing her medications, she is having  significant improvement in pain during the day time. She is currently taking Lyrica 75mg TID and Celebrex 200mg TID. However, she states that the burning  And tingling pain in both her feet increase in the evening around 6 or 7pm. She is unable to sleep during the nights due to the pain. She is still wearing her bilateral boots due to non healing ulcers from her scleroderma.     Interval History 8/30/2018:  The patient returns to clinic today for follow up. She continues to report bilateral foot pain that is burning and tingling in nature. She reports that this pain is worse at night. She is currently taking Lyrica 75 mg BID with limited relief. She did not have any benefit with Mobic. She continues to take Aleve with some benefit. She continues to have nonhealing ulcers. She wears an ACE bandage to her right calf, as well as boots to her bilateral feet. She denies any other health changes. Her pain today is 7/10.     Interval History 7/11/2018:  Since previous encounter she has weaned from gabapentin to 600mg / day and did not notice significant worsening of pain, but was having somnelence from higher doses of the medication in the past.  We are weaning in an attempt to trial Lyrica as an alternative to gabapentin.  Tramadol causes significant sedation, limiting its use.  She has discontinued the use of the tramadol.  Additionally she does have benefit from anti-inflammatory medication, has been using aleve, has not trialed CANTRELL-2 inhibitors in the past.    Interval history 05/16/2018:      The patient has had x-rays of the lumbar spine which did not reveal any evidence of significant neuroforaminal stenosis with degenerative disc disease.  There is mild facet arthropathy.  She has escalated her gabapentin and is currently taking 2100 mg of gabapentin per day without any noticeable improvement but daytime somnolence.  She continues to have nonhealing ulcers and topical pain cream is helping to a limited degree over  her leg in areas where there is no skin disruption.    Initial encounter:    Yamel Shah presents to the clinic for the evaluation of foot pain. The pain started 2 years ago following ulcers and symptoms have been worsening.    Brief history: History of scleroderma    Pain Description:    The pain is located in the both feet in the area of slowly healing chronic foot ulcers which were partly from venous insufficiency and stasis associated with her scleroderma.  In her right lower extremity she describes radicular symptoms in the L4/5 distribution.    At BEST  5/10     At WORST  10/10 on the WORST day.      On average pain is rated as 8/10.     Today the pain is rated as 8/10    The pain is described as burning, sharp, shooting and constant      Symptoms interfere with daily activity, sleeping and work.     Exacerbating factors: Laying, Walking, Night Time, Morning and Getting out of bed/chair.      Mitigating factors medications.     Patient denies night fever/night sweats, urinary incontinence, bowel incontinence, significant weight loss, significant motor weakness and loss of sensations.  Patient denies any suicidal or homicidal ideations    Pain Medications:  Current:  Lyrica 300 mg daily  Zanaflex  Relafen  Tylenol #3    Tried in Past:  NSAIDs -Aleve, Mobic, Celebrex  TCA -Never  SNRI -Never  Anti-convulsants - Gabapentin, Lyrica  Muscle Relaxants -Never  Opioids-Tramadol    Physical Therapy/Home Exercise: no       report:  Reviewed and consistent with medication use as prescribed.    Pain Procedures: none    Chiropractor -never  Acupuncture - never  TENS unit -never  Spinal decompression -never  Joint replacement -never    Imaging:   Xray Cervical Spine 12/6/2019:  FINDINGS:  Odontoid prevertebral soft tissues and posterior elements are intact.  Neural foramina are patent.  No fracture dislocation bone destruction seen.  There is mild DJD.     Impression:     Mild DJD.    X-ray lumbar spine  6/1/2016:  2 views: Alignment is normal. There is mild DJD. No fracture dislocation bone destruction seen.   Impression      Mild DJD.     Xray lumbar spine 4/2018:  COMPARISON:  June 2016    FINDINGS:  There is slight curvature of the lumbar spine.  The vertebral bodies are normally aligned and normal in height.  Mild disc space narrowing present at L5-S1.  There is mild facet degenerative change in the lower spine.  No significant osteophytic spurring present.  There is no change in alignment with flexion or extension.      Past Medical History:   Diagnosis Date    Abnormal Pap smear     Acid reflux     Allergy     Arthritis     Encounter for blood transfusion     GERD (gastroesophageal reflux disease)     Hiatal hernia 03/24/2023    History of migraine headaches     Hx of colonic polyp     Hypertension     Idiopathic neuropathy 07/20/2012    ILD (interstitial lung disease) 11/06/2013    Iron deficiency anemia 03/18/2014    MRSA carrier     Osteopenia     Pneumonia     Pulmonary fibrosis     Pulmonary hypertension     Raynaud's disease     Scleroderma, diffuse     Sjogren's syndrome     Vitamin D deficiency 11/14/2013     Past Surgical History:   Procedure Laterality Date    24 HOUR IMPEDANCE PH MONITORING OF ESOPHAGUS IN PATIENT NOT TAKING ACID REDUCING MEDICATIONS N/A 03/04/2020    Procedure: IMPEDANCE PH STUDY, ESOPHAGEAL, 24 HOUR, IN PATIENT NOT TAKING ACID REDUCING MEDICATION;  Surgeon: Annamaria Mendoza MD;  [facility]  Service: Endoscopy;  Laterality: N/A;  OFF PPI/H2 Blocker   Motility Studies   Hold Narcotics x 1 days   Hold TCA x 1 days  2/26 - LVM attempting to confirm appt  2/27 - Confirmed appt    ANORECTAL MANOMETRY N/A 05/10/2021    Procedure: MANOMETRY, ANORECTAL with balloon expulsion test;  Surgeon: Annamaria Mendoza MD;  Location: [facility] (4TH FLR);  Service: Endoscopy;  Laterality: N/A;  order combined  covid test 5/7 [facility], instructions emailed-[facility]    BREAST BIOPSY       Left, benign    BRONCHOSCOPY N/A 10/2/2023    Procedure: bronch;  Surgeon: Roberta Leigh DO;  Location: [facility] OR 2ND FLR;  Service: Endoscopy;  Laterality: N/A;    BRONCHOSCOPY N/A 9/16/2024    Procedure: Flexible bronchoscopy (BAL only);  Surgeon: Roberta Leigh DO;  Location: [facility] OR 2ND FLR;  Service: Endoscopy;  Laterality: N/A;    CERVICAL CONIZATION   W/ LASER  1970    COLONOSCOPY      COLONOSCOPY N/A 03/29/2019    Procedure: COLONOSCOPY;  Surgeon: Annamaria Mendoza MD;  Location: [facility] ENDO (2ND FLR);  Service: Endoscopy;  Laterality: N/A;    COLONOSCOPY N/A 05/10/2021    Procedure: COLONOSCOPY;  Surgeon: Annamaria Mendoza MD;  Location: [facility] ENDO (4TH FLR);  Service: Endoscopy;  Laterality: N/A;  2nd floor-previous scopes done on 2nd floor, gastroparesis  full liquid diet x2 days, clear liquid x1 day prior to procedure  covid test 5/7 [employer], instructions emailed-[facility]  5/6 pt confirmed appt-[facility]    DILATION AND CURETTAGE OF UTERUS      ESOPHAGEAL MANOMETRY WITH MEASUREMENT OF IMPEDANCE N/A 03/04/2020    Procedure: MANOMETRY, ESOPHAGUS, WITH IMPEDANCE MEASUREMENT;  Surgeon: Annamaria Mendoza MD;  Location: [facility] (4TH FLR);  Service: Endoscopy;  Laterality: N/A;  OFF PPI/H2 Blocker   Motility Studies   Hold Narcotics x 1 days   Hold TCA x 1 days    ESOPHAGOGASTRODUODENOSCOPY      ESOPHAGOGASTRODUODENOSCOPY N/A 03/29/2019    Procedure: EGD (ESOPHAGOGASTRODUODENOSCOPY);  Surgeon: Annamaria Mendoza MD;  Location: [facility] ENDO (2ND FLR);  Service: Endoscopy;  Laterality: N/A;  pulmonary htn    ESOPHAGOGASTRODUODENOSCOPY N/A 02/02/2021    Procedure: ESOPHAGOGASTRODUODENOSCOPY (EGD);  Surgeon: Annamaria Mendoza MD;  Location: [facility] ENDO (2ND FLR);  Service: Endoscopy;  Laterality: N/A;  2nd floor gastroparesis  3 days full liquid diet and 1 day clears  covid test 1/30 primary care, instructions sent to [facility]    ESOPHAGOGASTRODUODENOSCOPY N/A 07/01/2022    Procedure: ESOPHAGOGASTRODUODENOSCOPY  (EGD);  Surgeon: Annamaria Mendoza MD;  Location: [facility] ENDO (2ND FLR);  Service: Endoscopy;  Laterality: N/A;  2nd floor-gastroparesis  full liquid diet x3 days, clear liquid diet x1 day prior to procedure  fully vaccinated, instructions sent to [facility]  6/29-pt confirmed new arrival time-[facility]    EXCISION, LESION, STOMACH, LAPAROSCOPIC N/A 03/23/2023    Procedure: XI ROBOTIC EXCISION, LESION, STOMACH;  Surgeon: Katherine Segura MD;  [facility];  Service: General;  Laterality: N/A;    EXCISIONAL BIOPSY, LYMPH NODE Right 05/26/2023    Procedure: EXCISIONAL BIOPSY, LYMPH NODE, INGUINAL;  Surgeon: Katherine Segura MD;  Location: [facility] OR [address] [place] [address];  Service: General;  Laterality: Right;    HYSTERECTOMY  1990    FRANDY (AUB, Fibroids), ovaries remain    REPAIR, HERNIA, UMBILICAL N/A 03/23/2023    Procedure: REPAIR, HERNIA, UMBILICAL;  Surgeon: Katherine Segura MD;  Location: [address];  Service: General;  Laterality: N/A;    RIGHT HEART CATHETERIZATION Right 01/05/2021    Procedure: INSERTION, CATHETER, RIGHT HEART;  Surgeon: Aureliano Sy MD;  Location: [facility] CATH LAB;  Service: Cardiology;  Laterality: Right;    RIGHT HEART CATHETERIZATION Right 03/16/2023    Procedure: INSERTION, CATHETER, RIGHT HEART;  Surgeon: Aureliano Sy MD;  Location: [facility] CATH LAB;  Service: Cardiology;  Laterality: Right;    RIGHT HEART CATHETERIZATION Right 11/22/2024    Procedure: INSERTION, CATHETER, RIGHT HEART;  Surgeon: Tamanna Martins MD;  Location: [facility] CATH LAB;  Service: Cardiology;  Laterality: Right;    ROBOT-ASSISTED LAPAROSCOPIC REPAIR OF INGUINAL HERNIA USING DA VERNELL XI Right 05/26/2023    Procedure: XI ROBOTIC REPAIR, HERNIA, INGUINAL, FEMORAL;  Surgeon: Katherine Segura MD;  Location: [address];  Service: General;  Laterality: Right;    ROBOT-ASSISTED REPAIR OF HIATAL HERNIA USING DA VERNELL XI N/A 03/23/2023    Procedure: XI ROBOTIC REPAIR,  HERNIA, HIATAL;  Surgeon: Katherine Segura MD;  Location: [facility] OR 2ND FLR;  Service: General;  Laterality: N/A;    VARICOSE VEIN SURGERY      XI ROBOTIC GASTROPEXY N/A 03/23/2023    Procedure: XI ROBOTIC GASTROPEXY;  Surgeon: Katherine Segura MD;  Location: [facility] OR 2ND FLR;  Service: General;  Laterality: N/A;    XI ROBOTIC PYLOROMYOTOMY N/A 03/23/2023    Procedure: XI ROBOTIC PYLOROMYOTOMY;  Surgeon: Katherine Segura MD;  Location: [facility] OR 2ND FLR;  Service: General;  Laterality: N/A;     Social History     Socioeconomic History    Marital status:      Spouse name: Lewis    Number of children: 4   Occupational History    Occupation: -retired     Employer: [employer]     Comment: [place]     Employer: RETIRED   Tobacco Use    Smoking status: Never     Passive exposure: Never    Smokeless tobacco: Never   Substance and Sexual Activity    Alcohol use: Not Currently    Drug use: No    Sexual activity: Yes     Partners: Male     Birth control/protection: Post-menopausal, None, See Surgical Hx     Comment: Hysterectomy   Other Topics Concern    Are you pregnant or think you may be? No    Breast-feeding No   Social History Narrative         Social Drivers of Health     Financial Resource Strain: Low Risk  (4/5/2025)    Overall Financial Resource Strain (CARDIA)     Difficulty of Paying Living Expenses: Not hard at all   Food Insecurity: No Food Insecurity (4/5/2025)    Hunger Vital Sign     Worried About Running Out of Food in the Last Year: Never true     Ran Out of Food in the Last Year: Never true   Transportation Needs: No Transportation Needs (4/5/2025)    PRAPARE - Transportation     Lack of Transportation (Medical): No     Lack of Transportation (Non-Medical): No   Physical Activity: Inactive (4/5/2025)    Exercise Vital Sign     Days of Exercise per Week: 0 days     Minutes of Exercise per Session: 0 min   Stress: No Stress Concern Present (4/5/2025)     [facility] of Occupational Health - Occupational Stress Questionnaire     Feeling of Stress : Only a little   Housing Stability: Low Risk  (4/5/2025)    Housing Stability Vital Sign     Unable to Pay for Housing in the Last Year: No     Homeless in the Last Year: No     Family History   Problem Relation Name Age of Onset    Breast cancer Mother Tiki Singh     Cancer Mother Tiki Singh         Breast    Hypertension Father Madi     Diabetes Father Madi         Amputation of legs    Breast cancer Sister Brenda     Diabetes Sister Brenda     Arthritis Sister Brenda     Cancer Sister Brenda         Breast    Osteoarthritis Brother Luis     Diabetes Brother Luis     Arthritis Brother Luis     Birth defects Brother Luis         Pol[place] at birth, recently had knee replacement surgery on left knee    No Known Problems Daughter      No Known Problems Daughter      No Known Problems Son      No Known Problems Son      Breast cancer Maternal Aunt Gege     Cancer Maternal Aunt Gege         Breast    Early death Sister Philomena     Melanoma Neg Hx      Colon cancer Neg Hx      Crohn's disease Neg Hx      Stomach cancer Neg Hx      Ulcerative colitis Neg Hx      Rectal cancer Neg Hx      Irritable bowel syndrome Neg Hx      Esophageal cancer Neg Hx      Celiac disease Neg Hx      Ovarian cancer Neg Hx      Liver cancer Neg Hx      Pancreatic cancer Neg Hx         Review of patient's allergies indicates:   Allergen Reactions    Sulfa (sulfonamide antibiotics)      Other reaction(s): Rash       Current Outpatient Medications   Medication Sig    0.9 % sodium chloride (SODIUM CHLORIDE 0.9%) 0.9 % flush 10 mL 3 TIMES DAILY (route: injection)    acetaminophen-codeine 300-60mg (TYLENOL #4) 300-60 mg Tab 1 tablet BEDTIME (route: oral)    acetaminophen-codeine 300-60mg (TYLENOL #4) 300-60 mg Tab Take 1 tablet by mouth every evening.    albuterol (VENTOLIN HFA) 90 mcg/actuation inhaler Inhale 2 puffs into the lungs  every 6 (six) hours as needed for Wheezing. Rescue    ammonium lactate 12 % Crea Apply 20 g topically once daily.    colistimethate (COLYMYCIN) 150 mg injection     DULoxetine (CYMBALTA) 60 MG capsule Take 1 capsule (60 mg total) by mouth 2 (two) times daily.    ergocalciferol (ERGOCALCIFEROL) 50,000 unit Cap Take 1 capsule (50,000 Units total) by mouth every 7 days.    furosemide (LASIX) 20 MG tablet Take 1 tablet (20 mg total) by mouth once daily.    furosemide (LASIX) 20 MG tablet Take 20 mg by mouth.    heparin sodium,porcine (HEPARIN LOCK FLUSH IV)     levoFLOXacin (LEVAQUIN) 250 mg/10 mL Soln Take 20 mLs (500 mg total) by mouth once daily. for 7 days    meropenem (MERREM) 1 gram injection     multivitamin capsule Take 1 capsule by mouth once daily.    mycophenolate mofetil (CELLCEPT) 200 mg/mL SusR Take 5 mLs (1,000 mg total) by mouth 2 (two) times daily.    nabumetone (RELAFEN) 750 MG tablet Take 1 tablet (750 mg total) by mouth daily as needed for Pain.    nabumetone (RELAFEN) 750 MG tablet Take 750 mg by mouth.    nebulizer and compressor Rosemary Use as directed    NIFEdipine (PROCARDIA-XL) 90 MG (OSM) 24 hr tablet Take 90 mg by mouth.    OFEV 100 mg Cap TAKE 1 CAPSULE BY MOUTH 2 TIMES A DAY    pravastatin (PRAVACHOL) 20 MG tablet Take 1 tablet (20 mg total) by mouth every evening.    pregabalin (LYRICA) 300 MG Cap 1 capsule 2 TIMES DAILY (route: oral)    pregabalin (LYRICA) 300 MG Cap Take 1 capsule (300 mg total) by mouth 2 (two) times daily.    PREVIDENT 5000 BOOSTER PLUS 1.1 % Pste SMARTSIG:To Teeth    promethazine-codeine 6.25-10 mg/5 ml (PHENERGAN WITH CODEINE) 6.25-10 mg/5 mL syrup Take 5 mLs by mouth every 8 (eight) hours as needed for Cough.    RABEprazole (ACIPHEX) 20 mg tablet Take 1 tablet (20 mg total) by mouth 2 (two) times daily.    RABEprazole (ACIPHEX) 20 mg tablet Take 20 mg by mouth 2 (two) times daily.     KIT, STERILE MISC USE 10 ML FOR MEDICATION ADMINISTRATION WITH SPRAY  "BOTTLE- DIRECTIONS FOR USE (SK), #2 30ML SPRAY BOTTLES, #1 FUNNEL, SALINE 5ML VIALS #300ML    tadalafil (ADCIRCA) 20 mg Tab Take 2 tablets (40 mg total) by mouth once daily.    tadalafiL (CIALIS) 20 MG Tab Take 20 mg by mouth.    tiZANidine (ZANAFLEX) 4 MG tablet Take 2 tablets (8 mg total) by mouth 2 (two) times daily as needed (muscle pain).    tiZANidine 4 mg Cap 2 tablet 2 TIMES DAILY (route: oral)    TOBRAMYCIN SULFATE, BULK, MISC MIX 2 GRAMS OF POWDER WITH DILUENT. APPLY TO AFFECTED AREAS. PERFORM ONCE DAILY.     No current facility-administered medications for this visit.     Facility-Administered Medications Ordered in Other Visits   Medication    fentaNYL 50 mcg/mL injection 25 mcg    haloperidol lactate injection 0.5 mg    sodium chloride 0.9% flush 10 mL       REVIEW OF SYSTEMS:    General: -fever, -chills, -fatigue, -weight gain, -weight loss  Eyes: -vision changes, -redness, -discharge  ENT: -ear pain, -nasal congestion, -sore throat  Cardiovascular: -chest pain, -palpitations, -lower extremity edema  Respiratory: -cough, -shortness of breath  Gastrointestinal: -abdominal pain, -nausea, -vomiting, -diarrhea, -constipation, -blood in stool  Genitourinary: -dysuria, -hematuria, -frequency  Musculoskeletal: -joint pain, -muscle pain, +limb pain, +nerve pain, +bone deformity  Skin: -rash, -lesion, +wound  Neurological: -headache, -dizziness, -numbness, +tingling  Psychiatric: -anxiety, -depression, -sleep difficulty        OBJECTIVE:      BP (!) 125/59 (BP Location: Right arm, Patient Position: Sitting)   Pulse 71   Temp 98 °F (36.7 °C) (Oral)   Resp 18   Ht 5' 5" (1.651 m)   Wt 63.7 kg (140 lb 6.9 oz)   SpO2 95%   BMI 23.37 kg/m²     PHYSICAL EXAMINATION:    GENERAL: Well appearing, in no acute distress, alert and oriented x3.  PSYCH:  Mood and affect appropriate.  SKIN: Tight skin associated with scleroderma. Socks over wounds, with slipper   HEAD/FACE:  Normocephalic, atraumatic. Cranial nerves " grossly intact.  CV: RRR with palpation of the radial artery.  PULM: No evidence of respiratory difficulty, symmetric chest rise.  EXTREMITIES: Contractures over on bilateral hands with ulcerations to knuckles.   MUSCULOSKELETAL:   Bilateral lower extremity strength testing limited secondary to pain in the feet.  Full strength on bilateral hip flexion, knee extension. Unable to assess PF/DF due to pain.   NEURO:  Decreased sensation throughout BLE  GAIT: Antalgic, ambulates without assistance       Assessment & Plan     74 y.o. year old female with bilateral feet pain, consistent with the followin. Chronic pain disorder        2. Myofascial pain        3. Encounter for monitoring opioid maintenance therapy        4. Chronic pain of both feet          PLAN:  - Currently, does not need refills however advised patient to reach out through portal to refill all current pain medications   - Continue Lyrica 300 mg BID.   - Continue Relafen 750 mg QD PRN  - Continue Zanaflex 8 mg at bedtime.   - Increase Tylenol #4  dosage to 1.5 tablets as needed. Advised that at night, she can take 1.5 tablets.    If patient runs out of medications sooner, then we can sent prescription for 45 tabs / 1 month (instead of 30 tablets)   - Currently on antibiotics due to ongoing infection in feet. Likely with resolution of injection, her pain should also improve. We discussed with her that as the pain improves, we will start weaning off the pain medications.   - Pain Contract signed 2022  - The patient is here today for continued opiate management and she believe these provide effective pain control and improvements in quality of life.  The patient notes no serious side effects, and feels the benefits outweigh the risks.  The patient was reminded of the pain contract that they signed previously as well as the risks and benefits of the medication including possible death.  The updated [place] Board [employer] prescription  monitoring program was reviewed, and the patient has been found to be compliant with current treatment plan. Medication management provided by Dr. Hinson.   - UDS on  2/4/2025  reviewed and consistent.   - Continue home exercise routine.   - RTC in 3 months or sooner if needed.    The above plan and management options were discussed at length with patient. Patient is in agreement with the above and verbalized understanding.     Miles Giraldo MD  05/26/2025  [employer] Pain Fellow       I spent a total of 30 minutes on the day of the visit.  This includes face to face time and non-face to face time preparing to see the patient by reviewing previous labs/imaging, obtaining and/or reviewing separately obtained history, documenting clinical information in the electronic or other health record, independently interpreting results and communicating results to the patient/family/caregiver. This note was generated with the assistance of ambient listening technology. Verbal consent was obtained by the patient and accompanying visitor(s) for the recording of patient appointment to facilitate this note. I attest to having reviewed and edited the generated note for accuracy, though some syntax or spelling errors may persist. Please contact the author of this note for any clarification.       Sree Hinson

## 2025-05-27 ENCOUNTER — HOSPITAL ENCOUNTER (OUTPATIENT)
Dept: PULMONOLOGY | Facility: CLINIC | Age: 75
Discharge: HOME | End: 2025-05-27
Payer: MEDICARE

## 2025-05-27 ENCOUNTER — OFFICE VISIT (OUTPATIENT)
Dept: TRANSPLANT | Facility: CLINIC | Age: 75
End: 2025-05-27
Payer: MEDICARE

## 2025-05-27 VITALS
DIASTOLIC BLOOD PRESSURE: 60 MMHG | WEIGHT: 140 LBS | TEMPERATURE: 98 F | SYSTOLIC BLOOD PRESSURE: 133 MMHG | HEIGHT: 65 IN | OXYGEN SATURATION: 96 % | BODY MASS INDEX: 23.32 KG/M2 | HEART RATE: 82 BPM

## 2025-05-27 DIAGNOSIS — I27.21 WHO GROUP 1 PULMONARY ARTERIAL HYPERTENSION: ICD-10-CM

## 2025-05-27 DIAGNOSIS — J84.9 ILD (INTERSTITIAL LUNG DISEASE): ICD-10-CM

## 2025-05-27 DIAGNOSIS — M34.9 SCLERODERMA WITH PULMONARY INVOLVEMENT: ICD-10-CM

## 2025-05-27 DIAGNOSIS — Z79.899 HIGH RISK MEDICATION USE: ICD-10-CM

## 2025-05-27 DIAGNOSIS — J84.9 ILD (INTERSTITIAL LUNG DISEASE): Primary | ICD-10-CM

## 2025-05-27 DIAGNOSIS — R93.89 ABNORMAL CT OF THE CHEST: ICD-10-CM

## 2025-05-27 LAB
DLCO SINGLE BREATH LLN: 15.73
DLCO SINGLE BREATH PRE REF: 42.3 %
DLCO SINGLE BREATH REF: 21.46
DLCOC SBVA LLN: 2.82
DLCOC SBVA REF: 4.2
DLCOC SINGLE BREATH LLN: 15.73
DLCOC SINGLE BREATH REF: 21.46
DLCOCSBVAULN: 5.59
DLCOCSINGLEBREATHULN: 27.2
DLCOSINGLEBREATHULN: 27.2
DLCOSINGLEBREATHZSCORE: -3.55
DLCOVA LLN: 2.82
DLCOVA PRE REF: 81.6 %
DLCOVA PRE: 3.43 ML/(MIN*MMHG*L) (ref 2.82–5.59)
DLCOVA REF: 4.2
DLCOVAULN: 5.59
ERV LLN: -16449.39
ERV PRE REF: 42 %
ERV REF: 0.61
ERVULN: ABNORMAL
FEF 25 75 LLN: 1.1
FEF 25 75 PRE REF: 71.5 %
FEF 25 75 REF: 2.5
FEV05 LLN: 0.85
FEV05 REF: 1.71
FEV1 FVC LLN: 64
FEV1 FVC PRE REF: 109.5 %
FEV1 FVC REF: 78
FEV1 LLN: 1.57
FEV1 PRE REF: 62.9 %
FEV1 REF: 2.18
FEV1FVCZSCORE: 1
FEV1ZSCORE: -2.15
FRCPLETH LLN: 1.95
FRCPLETH PREREF: 49.8 %
FRCPLETH REF: 2.77
FRCPLETHULN: 3.59
FVC LLN: 2.04
FVC PRE REF: 56.9 %
FVC REF: 2.84
FVCZSCORE: -2.58
IVC PRE: 1.75 L (ref 2.04–3.67)
IVC SINGLE BREATH LLN: 2.04
IVC SINGLE BREATH PRE REF: 61.6 %
IVC SINGLE BREATH REF: 2.84
IVCSINGLEBREATHULN: 3.67
LLN IC: -16447.95
PEF LLN: 3.8
PEF PRE REF: 101.7 %
PEF REF: 5.57
PHYSICIAN COMMENT: ABNORMAL
PRE DLCO: 9.08 ML/(MIN*MMHG) (ref 15.73–27.2)
PRE ERV: 0.26 L (ref -16449.39–16450.61)
PRE FEF 25 75: 1.79 L/S (ref 1.1–3.9)
PRE FET 100: 6.24 SEC
PRE FEV05 REF: 69.7 %
PRE FEV1 FVC: 84.94 % (ref 63.85–89.43)
PRE FEV1: 1.37 L (ref 1.57–2.77)
PRE FEV5: 1.19 L (ref 0.85–2.56)
PRE FRC PL: 1.38 L (ref 1.95–3.59)
PRE FVC: 1.61 L (ref 2.04–3.67)
PRE IC: 1.49 L (ref -16447.95–16452.05)
PRE PEF: 5.66 L/S (ref 3.8–7.33)
PRE REF IC: 72.8 %
PRE RV: 1.12 L (ref 1.58–2.73)
PRE TLC: 2.87 L (ref 4.12–6.09)
RAW PRE REF: 159.7 %
RAW PRE: 4.88 CMH2O*S/L (ref 3.06–3.06)
RAW REF: 3.06
REF IC: 2.05
RV LLN: 1.58
RV PRE REF: 52 %
RV REF: 2.16
RVTLC LLN: 34
RVTLC PRE REF: 89.3 %
RVTLC PRE: 39.09 % (ref 34.19–53.37)
RVTLC REF: 44
RVTLCULN: 53
RVULN: 2.73
SGAW PRE REF: 102.8 %
SGAW PRE: 0.1 1/(CMH2O*S) (ref 0.1–0.1)
SGAW REF: 0.1
TLC LLN: 4.12
TLC PRE REF: 56.2 %
TLC REF: 5.11
TLC ULN: 6.09
TLCZSCORE: -3.73
ULN IC: ABNORMAL
VA PRE: 2.65 L (ref 4.96–4.96)
VA SINGLE BREATH LLN: 4.96
VA SINGLE BREATH PRE REF: 53.4 %
VA SINGLE BREATH REF: 4.96
VASINGLEBREATHULN: 4.96
VC LLN: 2.04
VC PRE REF: 61.6 %
VC PRE: 1.75 L (ref 2.04–3.67)
VC REF: 2.84
VC ULN: 3.67

## 2025-05-27 PROCEDURE — 94010 BREATHING CAPACITY TEST: CPT | Mod: PBBFAC,NTX | Performed by: INTERNAL MEDICINE

## 2025-05-27 PROCEDURE — 94726 PLETHYSMOGRAPHY LUNG VOLUMES: CPT | Mod: PBBFAC,NTX | Performed by: INTERNAL MEDICINE

## 2025-05-27 PROCEDURE — 99214 OFFICE O/P EST MOD 30 MIN: CPT | Mod: 25,S$PBB,NTX, | Performed by: INTERNAL MEDICINE

## 2025-05-27 PROCEDURE — 94729 DIFFUSING CAPACITY: CPT | Mod: PBBFAC,NTX | Performed by: INTERNAL MEDICINE

## 2025-05-27 PROCEDURE — 99999 PR PBB SHADOW E&M-EST. PATIENT-LVL III: CPT | Mod: PBBFAC,TXP,, | Performed by: INTERNAL MEDICINE

## 2025-05-27 PROCEDURE — 99213 OFFICE O/P EST LOW 20 MIN: CPT | Mod: PBBFAC,TXP | Performed by: INTERNAL MEDICINE

## 2025-05-27 RX ORDER — ALBUTEROL SULFATE 90 UG/1
2 INHALANT RESPIRATORY (INHALATION) EVERY 6 HOURS PRN
Qty: 8.5 G | Refills: 11 | Status: SHIPPED | OUTPATIENT
Start: 2025-05-27

## 2025-05-27 NOTE — LETTER
May 28, 2025        Luis Ahuja  [address]  [facility] [address]  Phone: [phone]  Fax: [phone]             [facility] Trans[facility] 1st Fl  [address]  [facility] [address]  Phone: [phone]   Patient: Yamel Shah   MR Number: [id number]   YOB: 1950   Date of Visit: 5/27/2025       Dear Dr. Luis Ahuja    Thank you for referring Yamel Shah to me for evaluation. Attached you will find relevant portions of my assessment and plan of care.    If you have questions, please do not hesitate to call me. I look forward to following Yamel Shah along with you.    Sincerely,    Roberta Leigh, DO    Enclosure    If you would like to receive this communication electronically, please contact [email or web address] or [phone] to request [employer] access.    [employer] is a tool which provides read-only access to select patient information with whom you have a relationship. Its easy to use and provides real time access to review your patients record including encounter summaries, notes, results, and demographic information.    If you feel you have received this communication in error or would no longer like to receive these types of communications, please e-mail [email or web address]

## 2025-05-28 ENCOUNTER — OFFICE VISIT (OUTPATIENT)
Dept: SURGERY | Facility: CLINIC | Age: 75
End: 2025-05-28
Payer: MEDICARE

## 2025-05-28 ENCOUNTER — RESULTS FOLLOW-UP (OUTPATIENT)
Dept: TRANSPLANT | Facility: CLINIC | Age: 75
End: 2025-05-28

## 2025-05-28 ENCOUNTER — TELEPHONE (OUTPATIENT)
Dept: TRANSPLANT | Facility: CLINIC | Age: 75
End: 2025-05-28
Payer: MEDICARE

## 2025-05-28 VITALS
BODY MASS INDEX: 23.32 KG/M2 | SYSTOLIC BLOOD PRESSURE: 125 MMHG | DIASTOLIC BLOOD PRESSURE: 75 MMHG | HEIGHT: 65 IN | WEIGHT: 140 LBS | HEART RATE: 76 BPM

## 2025-05-28 DIAGNOSIS — R59.0 INGUINAL ADENOPATHY: ICD-10-CM

## 2025-05-28 DIAGNOSIS — M34.9 SCLERODERMA WITH PULMONARY INVOLVEMENT: ICD-10-CM

## 2025-05-28 DIAGNOSIS — R19.09 RIGHT GROIN MASS: Primary | ICD-10-CM

## 2025-05-28 DIAGNOSIS — K41.90 FEMORAL HERNIA OF RIGHT SIDE: ICD-10-CM

## 2025-05-28 PROCEDURE — 99999 PR PBB SHADOW E&M-EST. PATIENT-LVL V: CPT | Mod: PBBFAC,TXP,, | Performed by: SURGERY

## 2025-05-28 PROCEDURE — 99215 OFFICE O/P EST HI 40 MIN: CPT | Mod: PBBFAC,TXP | Performed by: SURGERY

## 2025-05-28 PROCEDURE — 99204 OFFICE O/P NEW MOD 45 MIN: CPT | Mod: S$PBB,TXP,, | Performed by: SURGERY

## 2025-05-28 NOTE — PROGRESS NOTES
ADVANCED LUNG DISEASE CLINIC FOLLOW-UP                                                                                                                                             Reason for Visit:  SSc-ILD    Referring Physician: Roberta Leigh, *    History of Present Illness: Yamel Shah is a 74 y.o. female who is on 0L of oxygen.  She is on no assisted ventilation.  Her New York Heart Association Class is II and a Karnofsky score of 90% - Able to carry on normal activity: minor symptoms of disease. She is not diabetic.    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  She recently developed a productive cough of brown/green sputum.  Sputum culture negative.  Was started on Levaquin.  Symptoms have been improving.  Productive cough has resolved but she feels dyspneic with exertion and climbing stairs more than previous.  She remains on OFEV and Adcirca.  MMF on hold due to recent infectious symptoms and abx use.  She completed abx yesterday.  Also following with podiatry for foot ulcerations.  No infection but is undergoing wound care.      Past Medical History:   Diagnosis Date    Abnormal Pap smear     Acid reflux     Allergy     Arthritis     Encounter for blood transfusion     GERD (gastroesophageal reflux disease)     Hiatal hernia 03/24/2023    History of migraine headaches     Hx of colonic polyp     Hypertension     Idiopathic neuropathy 07/20/2012    ILD (interstitial lung disease) 11/06/2013    Iron deficiency anemia 03/18/2014    MRSA carrier     Osteopenia     Pneumonia     Pulmonary fibrosis     Pulmonary hypertension     Raynaud's disease     Scleroderma, diffuse     Sjogren's syndrome     Vitamin D deficiency 11/14/2013       Past Surgical History:   Procedure Laterality Date    24 HOUR IMPEDANCE PH MONITORING OF ESOPHAGUS IN PATIENT NOT TAKING ACID REDUCING MEDICATIONS N/A 03/04/2020    Procedure: IMPEDANCE PH STUDY, ESOPHAGEAL, 24 HOUR, IN PATIENT NOT TAKING  ACID REDUCING MEDICATION;  Surgeon: Annamaria Mendoza MD;  Location: [facility] ENDO (4TH FLR);  Service: Endoscopy;  Laterality: N/A;  OFF PPI/H2 Blocker   Motility Studies   Hold Narcotics x 1 days   Hold TCA x 1 days  2/26 - LVM attempting to confirm appt  2/27 - Confirmed appt    ANORECTAL MANOMETRY N/A 05/10/2021    Procedure: MANOMETRY, ANORECTAL with balloon expulsion test;  Surgeon: Annamaria Mendoza MD;  Location: [facility] ENDO (4TH FLR);  Service: Endoscopy;  Laterality: N/A;  order combined  covid test 5/7 Confucianist, instructions emailed-[facility]    BREAST BIOPSY      Left, benign    BRONCHOSCOPY N/A 10/2/2023    Procedure: bronch;  Surgeon: Roberta Leigh DO;  Location: [facility] OR 2ND FLR;  Service: Endoscopy;  Laterality: N/A;    BRONCHOSCOPY N/A 9/16/2024    Procedure: Flexible bronchoscopy (BAL only);  Surgeon: Roberta Leigh DO;  Location: [facility] OR 2ND FLR;  Service: Endoscopy;  Laterality: N/A;    CERVICAL CONIZATION   W/ LASER  1970    COLONOSCOPY      COLONOSCOPY N/A 03/29/2019    Procedure: COLONOSCOPY;  Surgeon: Annamaria Mendoza MD;  Location: [facility] (2ND FLR);  Service: Endoscopy;  Laterality: N/A;    COLONOSCOPY N/A 05/10/2021    Procedure: COLONOSCOPY;  Surgeon: Annamaria Mendoza MD;  Location: [facility] (4TH FLR);  Service: Endoscopy;  Laterality: N/A;  2nd floor-previous scopes done on 2nd floor, gastroparesis  full liquid diet x2 days, clear liquid x1 day prior to procedure  covid test 5/7 Confucianist, instructions emailed-[facility]  5/6 pt confirmed appt-[facility]    DILATION AND CURETTAGE OF UTERUS      ESOPHAGEAL MANOMETRY WITH MEASUREMENT OF IMPEDANCE N/A 03/04/2020    Procedure: MANOMETRY, ESOPHAGUS, WITH IMPEDANCE MEASUREMENT;  Surgeon: Annamaria Mendoza MD;  Location: [facility] ENDO (4TH FLR);  Service: Endoscopy;  Laterality: N/A;  OFF PPI/H2 Blocker   Motility Studies   Hold Narcotics x 1 days   Hold TCA x 1 days    ESOPHAGOGASTRODUODENOSCOPY      ESOPHAGOGASTRODUODENOSCOPY N/A 03/29/2019    Procedure:  EGD (ESOPHAGOGASTRODUODENOSCOPY);  Surgeon: Annamaria Mendoza MD;  Location: [facility] ENDO (2ND FLR);  Service: Endoscopy;  Laterality: N/A;  pulmonary htn    ESOPHAGOGASTRODUODENOSCOPY N/A 02/02/2021    Procedure: ESOPHAGOGASTRODUODENOSCOPY (EGD);  Surgeon: Annamaria Mendoza MD;  Location: [facility] ENDO (2ND FLR);  Service: Endoscopy;  Laterality: N/A;  2nd floor gastroparesis  3 days full liquid diet and 1 day clears  covid test 1/30 primary care, instructions sent to [facility]    ESOPHAGOGASTRODUODENOSCOPY N/A 07/01/2022    Procedure: ESOPHAGOGASTRODUODENOSCOPY (EGD);  Surgeon: Annamaria Mendoza MD;  [facility]  Service: Endoscopy;  Laterality: N/A;  2nd floor-gastroparesis  full liquid diet x3 days, clear liquid diet x1 day prior to procedure  fully vaccinated, instructions sent to [facility]  6/29-pt confirmed new arrival time-\[facility] [place] [facility]\""    EXCISION, LESION, STOMACH, LAPAROSCOPIC N/A 03/23/2023    Procedure: XI ROBOTIC EXCISION, LESION, STOMACH;  Surgeon: Katherine Segura MD;  Location: [facility] OR [address] [place] [address];  Service: General;  Laterality: N/A;    EXCISIONAL BIOPSY, LYMPH NODE Right 05/26/2023    Procedure: EXCISIONAL BIOPSY, LYMPH NODE, INGUINAL;  Surgeon: Katherine Segura MD;  Location: [facility] OR [address] [place] [address];  Service: General;  Laterality: Right;    HYSTERECTOMY  1990    FRANDY (AUB, Fibroids), ovaries remain    REPAIR, HERNIA, UMBILICAL N/A 03/23/2023    Procedure: REPAIR, HERNIA, UMBILICAL;  Surgeon: Katherine Segura MD;  Location: [facility] OR [address] [place] [address];  Service: General;  Laterality: N/A;    RIGHT HEART CATHETERIZATION Right 01/05/2021    Procedure: INSERTION, CATHETER, RIGHT HEART;  Surgeon: Aureliano Sy MD;  Location: [facility] CATH LAB;  Service: Cardiology;  Laterality: Right;    RIGHT HEART CATHETERIZATION Right 03/16/2023    Procedure: INSERTION, CATHETER, RIGHT HEART;  Surgeon: Aureliano Sy MD;  Location: [facility] CATH LAB;  Service: Cardiology;  Laterality: Right;     RIGHT HEART CATHETERIZATION Right 11/22/2024    Procedure: INSERTION, CATHETER, RIGHT HEART;  Surgeon: Tamanna Martins MD;  Location: [facility] CATH LAB;  Service: Cardiology;  Laterality: Right;    ROBOT-ASSISTED LAPAROSCOPIC REPAIR OF INGUINAL HERNIA USING DA VERNELL XI Right 05/26/2023    Procedure: XI ROBOTIC REPAIR, HERNIA, INGUINAL, FEMORAL;  Surgeon: Katherine Segura MD;  Location: [facility] OR 2ND FLR;  Service: General;  Laterality: Right;    ROBOT-ASSISTED REPAIR OF HIATAL HERNIA USING DA VERNELL XI N/A 03/23/2023    Procedure: XI ROBOTIC REPAIR, HERNIA, HIATAL;  Surgeon: Katherine Segura MD;  Location: [facility] OR 2ND FLR;  Service: General;  Laterality: N/A;    VARICOSE VEIN SURGERY      XI ROBOTIC GASTROPEXY N/A 03/23/2023    Procedure: XI ROBOTIC GASTROPEXY;  Surgeon: Katherine Segura MD;  Location: [facility] OR [facility] FLR;  Service: General;  Laterality: N/A;    XI ROBOTIC PYLOROMYOTOMY N/A 03/23/2023    Procedure: XI ROBOTIC PYLOROMYOTOMY;  Surgeon: Katherine Segura MD;  Location: [facility] OR 2ND FLR;  Service: General;  Laterality: N/A;       Allergies: Sulfamethizole, Sulfamethoxazole, Spironolactone, Sulfa (sulfonamide antibiotics), and Tramadol    Current Outpatient Medications   Medication Sig    0.9 % sodium chloride (SODIUM CHLORIDE 0.9%) 0.9 % flush 10 mL 3 TIMES DAILY (route: injection)    acetaminophen-codeine 300-60mg (TYLENOL #4) 300-60 mg Tab 1 tablet BEDTIME (route: oral)    acetaminophen-codeine 300-60mg (TYLENOL #4) 300-60 mg Tab Take 1 tablet by mouth every evening.    albuterol (VENTOLIN HFA) 90 mcg/actuation inhaler Inhale 2 puffs into the lungs every 6 (six) hours as needed for Wheezing. Rescue    ammonium lactate 12 % Crea Apply 20 g topically once daily.    colistimethate (COLYMYCIN) 150 mg injection     DULoxetine (CYMBALTA) 60 MG capsule Take 1 capsule (60 mg total) by mouth 2 (two) times daily.    ergocalciferol (ERGOCALCIFEROL) 50,000 unit Cap Take 1 capsule (50,000  Units total) by mouth every 7 days.    furosemide (LASIX) 20 MG tablet Take 1 tablet (20 mg total) by mouth once daily.    furosemide (LASIX) 20 MG tablet Take 20 mg by mouth.    heparin sodium,porcine (HEPARIN LOCK FLUSH IV)     meropenem (MERREM) 1 gram injection     multivitamin capsule Take 1 capsule by mouth once daily.    mycophenolate mofetil (CELLCEPT) 200 mg/mL SusR Take 5 mLs (1,000 mg total) by mouth 2 (two) times daily.    nabumetone (RELAFEN) 750 MG tablet Take 1 tablet (750 mg total) by mouth daily as needed for Pain.    nabumetone (RELAFEN) 750 MG tablet Take 750 mg by mouth.    nebulizer and compressor Rosemary Use as directed    NIFEdipine (PROCARDIA-XL) 90 MG (OSM) 24 hr tablet Take 90 mg by mouth.    OFEV 100 mg Cap TAKE 1 CAPSULE BY MOUTH 2 TIMES A DAY    pravastatin (PRAVACHOL) 20 MG tablet Take 1 tablet (20 mg total) by mouth every evening.    pregabalin (LYRICA) 300 MG Cap 1 capsule 2 TIMES DAILY (route: oral)    pregabalin (LYRICA) 300 MG Cap Take 1 capsule (300 mg total) by mouth 2 (two) times daily.    PREVIDENT 5000 BOOSTER PLUS 1.1 % Pste SMARTSIG:To Teeth    promethazine-codeine 6.25-10 mg/5 ml (PHENERGAN WITH CODEINE) 6.25-10 mg/5 mL syrup Take 5 mLs by mouth every 8 (eight) hours as needed for Cough.    RABEprazole (ACIPHEX) 20 mg tablet Take 1 tablet (20 mg total) by mouth 2 (two) times daily.    RABEprazole (ACIPHEX) 20 mg tablet Take 20 mg by mouth 2 (two) times daily.     KIT, STERILE MISC USE 10 ML FOR MEDICATION ADMINISTRATION WITH SPRAY BOTTLE- DIRECTIONS FOR USE (SK), #2 30ML SPRAY BOTTLES, #1 FUNNEL, SALINE 5ML VIALS #300ML    tadalafil (ADCIRCA) 20 mg Tab Take 2 tablets (40 mg total) by mouth once daily.    tadalafiL (CIALIS) 20 MG Tab Take 20 mg by mouth.    tiZANidine (ZANAFLEX) 4 MG tablet Take 2 tablets (8 mg total) by mouth 2 (two) times daily as needed (muscle pain).    tiZANidine 4 mg Cap 2 tablet 2 TIMES DAILY (route: oral)    TOBRAMYCIN SULFATE, BULK,  MISC MIX 2 GRAMS OF POWDER WITH DILUENT. APPLY TO AFFECTED AREAS. PERFORM ONCE DAILY.     No current facility-administered medications for this visit.     Facility-Administered Medications Ordered in Other Visits   Medication    fentaNYL 50 mcg/mL injection 25 mcg    haloperidol lactate injection 0.5 mg    sodium chloride 0.9% flush 10 mL       Immunization History   Administered Date(s) Administered    COVID-19 MRNA, LN-S PF (MODERNA HALF 0.25 ML DOSE) 04/21/2022    COVID-19, MRNA, LN-S, PF (MODERNA FULL 0.5 ML DOSE) 02/09/2021, 03/12/2021, 09/21/2021    COVID-19, MRNA, LN-S, PF (Pfizer) (Purple Cap) 10/13/2022    COVID-19, mRNA, LNP-S, PF (Moderna) Ages 12+ 10/18/2023, 05/02/2024    COVID-19, mRNA, LNP-S, PF, rosi-sucrose, 30 mcg/0.3 mL (Pfizer Ages 12+) 11/07/2024    COVID-19, mRNA, LNP-S, bivalent booster, PF (Moderna Omicron)12 + YEARS 10/13/2022, 10/13/2022    Influenza 11/02/2015, 09/21/2021, 09/22/2023    Influenza (FLUAD) - Quadrivalent - Adjuvanted - PF *Preferred* (65+) 09/11/2020, 09/12/2022, 09/22/2023    Influenza - Quadrivalent - PF *Preferred* (6 months and older) 11/03/2006, 10/08/2007, 09/29/2009, 09/26/2014, 09/26/2014, 11/02/2015, 11/02/2015, 09/27/2016    Influenza - Trivalent - Afluria, Fluzone MDV 11/03/2006, 10/08/2007, 09/29/2009, 09/26/2014    Influenza - Trivalent - Fluad - Adjuvanted - PF (65 years and older 09/13/2024    Influenza - Trivalent - Fluarix, Flulaval, Fluzone, Afluria - PF 11/02/2015    Influenza - Trivalent - Fluzone High Dose - PF (65 years and older) 10/09/2017, 10/11/2017, 10/27/2018, 09/09/2019    Influenza Split 11/03/2006, 10/08/2007, 09/29/2009, 09/26/2014    Pneumococcal Conjugate - 13 Valent 01/16/2013, 01/16/2013    Pneumococcal Polysaccharide - 23 Valent 09/27/2016, 11/12/2021    RSVpreF (Arexvy) 10/27/2023    Tdap 02/06/2019    Zoster 01/16/2013    Zoster Recombinant 11/30/2018, 02/12/2019     Family History:    Family History   Problem Relation Name Age of Onset     Breast cancer Mother Tiki Singh     Cancer Mother Tiki Singh         Breast    Hypertension Father Madi     Diabetes Father Madi         Amputation of legs    Breast cancer Sister Brenda     Diabetes Sister Brenda     Arthritis Sister Brenda     Cancer Sister Brenda         Breast    Osteoarthritis Brother Luis     Diabetes Brother Luis     Arthritis Brother Luis     Birth defects Brother Luis         Polio at birth, recently had knee replacement surgery on left knee    No Known Problems Daughter      No Known Problems Daughter      No Known Problems Son      No Known Problems Son      Breast cancer Maternal Aunt Gege     Cancer Maternal Aunt Gege         Breast    Early death Sister Phiolmena     Melanoma Neg Hx      Colon cancer Neg Hx      Crohn's disease Neg Hx      Stomach cancer Neg Hx      Ulcerative colitis Neg Hx      Rectal cancer Neg Hx      Irritable bowel syndrome Neg Hx      Esophageal cancer Neg Hx      Celiac disease Neg Hx      Ovarian cancer Neg Hx      Liver cancer Neg Hx      Pancreatic cancer Neg Hx       Social History     Substance and Sexual Activity   Alcohol Use Not Currently      Social History     Substance and Sexual Activity   Drug Use No      Social History     Socioeconomic History    Marital status:      Spouse name: Lewis    Number of children: 4   Occupational History    Occupation: -retired     Employer: [employer]     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 No    Breast-feeding No   Social History Narrative          Social Drivers of Health     Financial Resource Strain: Low Risk  (4/5/2025)    Overall Financial Resource Strain (CARDIA)     Difficulty of Paying Living Expenses: Not hard at all   Food Insecurity: No Food Insecurity (4/5/2025)    Hunger Vital Sign     Worried About Running Out of Food in the Last Year: Never true     Ran Out of Food in the Last Year: Never true   Transportation Needs: No Transportation Needs (4/5/2025)    PRAPARE - Transportation     Lack of Transportation (Medical): No     Lack of Transportation (Non-Medical): No   Physical Activity: Inactive (4/5/2025)    Exercise Vital Sign     Days of Exercise per Week: 0 days     Minutes of Exercise per Session: 0 min   Stress: No Stress Concern Present (4/5/2025)    [place] [employer] - Occupational Stress Questionnaire     Feeling of Stress : Only a little   Housing Stability: Low Risk  (4/5/2025)    Housing Stability Vital Sign     Unable to Pay for Housing in the Last Year: No     Homeless in the Last Year: No     Review of Systems   Constitutional:  Negative for chills, diaphoresis, fever, malaise/fatigue and weight loss.   HENT:  Negative for congestion, ear discharge, ear pain, hearing loss, nosebleeds, sinus pain, sore throat and tinnitus.    Eyes:  Negative for blurred vision, double vision, photophobia, pain, discharge and redness.   Respiratory:  Positive for shortness of breath (heavy exertion only). Negative for cough, hemoptysis, sputum production, wheezing and stridor.    Cardiovascular:  Negative for chest pain, palpitations, orthopnea, claudication, leg swelling and PND.   Gastrointestinal:  Positive for heartburn. Negative for abdominal pain, blood in stool, constipation, diarrhea, melena, nausea and vomiting.   Genitourinary:  Negative for dysuria, flank pain, frequency, hematuria and urgency.   Musculoskeletal:  Negative for back pain, falls, joint pain, myalgias and neck pain.   Skin:  Negative for itching and  "rash.   Neurological:  Negative for dizziness, tingling, tremors, sensory change, speech change, focal weakness, seizures, loss of consciousness, weakness and headaches.   Endo/Heme/Allergies:  Negative for environmental allergies and polydipsia. Does not bruise/bleed easily.   Psychiatric/Behavioral:  Negative for depression, hallucinations, memory loss, substance abuse and suicidal ideas. The patient is not nervous/anxious and does not have insomnia.      Vitals  /60 (BP Location: Right arm, Patient Position: Sitting)   Pulse 82   Temp 97.9 °F (36.6 °C) (Oral)   Ht 5' 5" (1.651 m)   Wt 63.5 kg (140 lb)   SpO2 96%   BMI 23.30 kg/m²   Physical Exam  Vitals and nursing note reviewed.   Constitutional:       General: She is not in acute distress.     Appearance: She is well-developed. She is not diaphoretic.   HENT:      Head: Normocephalic and atraumatic.      Nose: Nose normal.      Mouth/Throat:      Pharynx: No oropharyngeal exudate.   Eyes:      General: No scleral icterus.     Conjunctiva/sclera: Conjunctivae normal.      Pupils: Pupils are equal, round, and reactive to light.   Neck:      Thyroid: No thyromegaly.      Vascular: No JVD.      Trachea: No tracheal deviation.   Cardiovascular:      Rate and Rhythm: Normal rate and regular rhythm.      Heart sounds: Normal heart sounds. No murmur heard.     No friction rub. No gallop.   Pulmonary:      Effort: Pulmonary effort is normal. No respiratory distress.      Breath sounds: Wheezing present.      Comments: Diffuse expiratory squeaks  Abdominal:      General: Bowel sounds are normal. There is no distension.      Palpations: Abdomen is soft.      Tenderness: There is no abdominal tenderness.   Musculoskeletal:         General: No deformity. Normal range of motion.      Cervical back: Normal range of motion and neck supple.   Skin:     General: Skin is warm and dry.      Capillary Refill: Capillary refill takes less than 2 seconds.      Coloration: " Skin is not pale.      Findings: No erythema or rash.      Comments: Skin tightening.  Digital ulcers and nailbed deformities.  Bilateral foot ulcers in dry intact dressing     Neurological:      Mental Status: She is alert and oriented to person, place, and time.      Cranial Nerves: No cranial nerve deficit.   Psychiatric:         Judgment: Judgment normal.         Labs:          5/27/2025    11:05 AM 2/17/2025    11:31 AM 8/22/2024     2:13 PM 3/4/2024     1:35 PM 9/18/2023    11:06 AM 8/17/2023     1:27 PM 5/23/2023     9:20 AM   Pulmonary Function Tests   FVC 1.61 liters 1.76 liters 1.77 liters 1.81 liters 1.76 liters 1.79 liters 1.9 liters   FEV1 1.37 liters 1.41 liters 1.46 liters 1.43 liters 1.46 liters 1.42 liters 1.58 liters   TLC (liters) 2.87 liters 3.01 liters 2.99 liters 3.28 liters 2.7 liters 3.11 liters 3.41 liters   DLCO (ml/mmHg sec) 9.08 ml/mmHg sec 11.37 ml/mmHg sec 10.7 ml/mmHg sec 10.62 ml/mmHg sec 9.15 ml/mmHg sec 10.8 ml/mmHg sec 11.37 ml/mmHg sec   FVC% 57 72.2 61.9 63 66.1 61.8 71.1   FEV1% 62 74.7 66.2 64 70.5 63.8 75.6   FEF 25-75 1.79 1.36 1.48 1.32 1.64 1.35 1.82   FEF 25-75% 71 54.5 58.4 72 93.5 73.2 103.5   TLC% 56 59 58.6 64 52.8 60.9 66.8   RV 1.12 1.25 1.18 1.33 0.94 1.32 1.46   RV% 52 58.1 55.2 62 44.2 62.2 68.5   DLCO% 42 53 49.5 49 42.1 49.7 52.3         3/28/2025    12:09 PM 8/22/2024    12:04 PM 4/29/2024     2:06 PM 3/4/2024    12:40 PM 9/18/2023    10:57 AM 8/17/2023     1:29 PM 5/22/2023     9:20 AM   6MW   6MWT Status completed without stopping completed without stopping completed without stopping completed without stopping completed without stopping completed without stopping completed without stopping   Patient Reported Other (Comment) Leg pain  Dyspnea;Leg pain  Other (Comment)  Dyspnea  Dyspnea  No complaints    Was O2 used? No No No No No No No   6MW Distance walked (feet) 1075 feet 1275 feet 1532 feet 1562 feet 1450 feet 1300 feet 1500 feet   Distance walked (meters)  327.66 meters 388.62 meters 466.95 meters 476.1 meters 441.96 meters 396.24 meters 457.2 meters   Did patient stop? No No No No No No No   Oxygen Saturation 100 % 97 % 99 % 100 % 100 % 98 % 100 %   Supplemental Oxygen Room Air Room Air  Room Air  Room Air  Room Air  Room Air  Room Air    Heart Rate 78 bpm 68 bpm 77 bpm 77 bpm 75 bpm 70 bpm 62 bpm   Blood Pressure 129/62 125/63 133/66 134/88 152/67 141/63 109/51   Ju Dyspnea Rating  nothing at all somewhat heavy moderate light light light moderate   Oxygen Saturation 98 % 97 % 97 % 96 % 98 % 96 % 98 %   Supplemental Oxygen Room Air Room Air  Room Air  Room Air  Room Air  Room Air  Room Air    Heart Rate 84 bpm 86 bpm 98 bpm 111 bpm 102 bpm 98 bpm 90 bpm   Blood Pressure 139/61 133/56 167/63 156/80 168/71 159/70 136/62   Ju Dyspnea Rating  moderate moderate somewhat heavy somewhat heavy moderate somewhat heavy somewhat heavy   Recovery Time (seconds) 55 seconds 65 seconds 157 seconds 121 seconds 123 seconds 100 seconds 131 seconds   Oxygen Saturation 100 % 99 % 100 % 100 % 100 % 100 % 100 %   Supplemental Oxygen Room Air Room Air   Room Air  Room Air  Room Air  Room Air    Heart Rate 82 bpm 78 bpm 88 bpm 84 bpm 90 bpm 82 bpm 71 bpm       Data saved with a previous flowsheet row definition       Imaging:  Results for orders placed during the hospital encounter of 12/22/20    CT Chest Without Contrast    Narrative  EXAMINATION:  CT CHEST WITHOUT CONTRAST    CLINICAL HISTORY:  pulmonary hypertension; Pulmonary hypertension, unspecified    TECHNIQUE:  Using low dose technique the chest was surveyed from above the pulmonary apices through the posterior costophrenic angles.  Data was reconstructed for multiplanar images in axial, sagittal and coronal planes and for maximal intensity projection images in the axial plane.    All CT scans at this facility use dose modulation, iterative reconstruction and/or weight based dosing when appropriate to reduce radiation dose to  as low as reasonably achievable.    Xray dose: DLP = 152.03 mGy-cm.    COMPARISON:  CT chest abdomen without contrast 03/13/2019.  CT chest without contrast 09/18/2015.    FINDINGS:  Base of Neck: No significant abnormality.    Aorta: Left-sided aortic arch with 3 arterial branches. The aorta maintains normal caliber, contour and course. There is mild calcification of the thoracic aorta or coronary arteries.    Heart/pericardium: Normal size.  No pericardial effusion or calcification.  Calcification of the aortic valve annulus.    Pulmonary vasculature: Pulmonary arteries distribute normally.  Pulmonary artery size is neither specific nor sensitive for normal or elevated pulmonary arterial pressures.    -There are four pulmonary veins.    Barbara/Mediastinum: No pathologic noelle enlargement.    Esophagus: The esophagus is mildly dilated with dependent fluid, similar to prior exam.    Upper Abdomen: No abnormality of the partially imaged upper abdomen.    Thoracic soft tissues: Normal. Both breasts are present.    Bones: No acute fracture. No suspicious lytic or sclerotic lesion.    Airways: Patent.    Lungs/pleura:    -Stable biapical scarring.    -Redemonstration of cystic changes and reticulation plus subsegmental ground-glass disease, most extensive in the lower lung zones.  Radiographic appearance is more typical for NSIP but could represent UIP in this patient with scleroderma.    -There are stable scattered foci of tree-in-bud nodularities, for example within the superior segment of the right lower lobe (axial series 4, image 211).  There is associated dilatation of multiple adjacent small airways.    -0.4 cm solid pulmonary nodule in the apical segment of the right upper lobe (axial series 4, image 83), stable dating back to 2015.    -No pleural fluid or thickening.No pleural calcification. No pneumothorax.    Impression  1.  Overall stable appearing chronic interstitial lung disease consistent with patient's  history of scleroderma.  Radiographic appearance is more typical for NSIP and UIP, noting that UIP is a more common disease.    2.  Previously characterized tree-in-bud opacity along the superior aspect of the right oblique fissure appears stable and may reflect chronic inflammatory process.    3.  0.4 cm solid right upper lobe pulmonary nodule, stable dating back to 09/18/2015.  Such stability favors benign etiology.    4.  Persistent fluid-filled esophagus as noted on multiple priors placing the patient at increased risk for aspiration.  This may be related to the patient's history of scleroderma; clinical considerations will determine if further assessment of esophageal motility is warranted.    Electronically signed by resident: Leobardo Arellano  Date:    12/22/2020  Time:    11:43    Electronically signed by: Maxine Del Cid MD  Date:    12/22/2020  Time:    14:23    Reading Physician Reading Date Result Priority   Bg Ferreira MD  [phone] 9/8/2023 Routine   Mikala Kruse MD  [phone] 9/8/2023      Narrative & Impression  EXAMINATION:  CT CHEST WITHOUT CONTRAST     CLINICAL HISTORY:  Lung nodule, 6-8mm;3 month follow up solitary pulmonary nodule; Abnormal findings on diagnostic imaging of other specified body structures     TECHNIQUE:  Low dose axial images, sagittal and coronal reformations were obtained from the thoracic inlet to the lung bases. Contrast was not administered.     All CT scans at this facility use dose modulation, iterative reconstruction and/or weight based dosing when appropriate to reduce rad iation dose to as low as reasonably achievable.     COMPARISON:  12/22/2020, 06/02/2023     FINDINGS:  No intravenous contrast was administered for this examination.  Therefore, it may have diminished sensitivity for detection of certain abnormalities.     Thyroid gland: Within normal limits.     Thoracic soft tissues: Unremarkable.     Mediastinum: No pathologically enlarged  lymph nodes.     Cardiovascular: Normal heart size.No pericardial effusion.     Trachea: Within normal limits.     Lungs/pleura/airways:     Redemonstration of basilar and subpleural predominant reticulation and ground-glass with architectural distortion, volume loss, and traction bronchiectasis/bronchiolectasis.  Worsening of patchy areas of ground-glass bilaterally.  There is a straight edge sign present with sharply marginated basilar pseudo honeycombing likely representing clustered dilated bronchiectasis and cystic bronchiectasis.  Anterior upper lobe sign also noted.  These findings are grossly stable compared to CT 12/22/2020.     0.6 cm solid pulmonary nodule in the left upper lobe (4-193), 0.4 cm solid pulmonary nodule left upper lobe (4-212), right upper lobe 0.4 cm solid pulmonary nodule (4-89).  These nodules are unchanged from CT 06/02/2023, but new from CT 12/22/2020..     Esophagus: Esophagus is dilated and fluid-filled to the level of the aortic arch compatible with achalasia in the setting of scleroderma..     Upper abdomen:     Partially imaged.  No significant abnormalities.     Bones: Unremarkable for stated age.     Impression:     Redemonstration of pulmonary fibrosis with mild increased bilateral patchy areas of patchy ground-glass.  Findings favored represent as NSIP patient with history of scleroderma.     Solid pulmonary nodules measuring up to 0.6 cm as detailed above which are stable from CT 06/02/2023, but new from 12/22/2020.  This can be reassessed in the next follow-up     Stable patulous esophagus.     Electronically signed by resident: Mikala Kruse  Date:                                            09/08/2023  Time:                                           15:29     Electronically signed by:Bg Granado  Date:                                            09/08/2023  Time:                                           17:14    Results for orders placed during the hospital  encounter of 05/26/25    CT Chest Without Contrast    Narrative  EXAMINATION:  CT CHEST WITHOUT CONTRAST    CLINICAL HISTORY:  Interstitial lung disease; Interstitial pulmonary disease, unspecified    TECHNIQUE:  Low dose axial images, sagittal and coronal reformations were obtained from the thoracic inlet to the lung bases. Contrast was not administered.    COMPARISON:  CT chest without contrast 02/17/2025    FINDINGS:  Structures at the base of the neck are unremarkable.    The thyroid is normal in size with no focal lesions.    Interval movable of right-sided central venous catheter.    Axillary and thoracic soft tissues are unremarkable    The heart is mildly enlarged in size with no pericardial effusion.  No significant coronary artery calcification.    Pulmonary vasculature distribute normally.    Left-sided aortic arch with mild calcific atherosclerosis.    Few prominent mediastinal and hilar lymph nodes, similar from comparison imaging.    Patulous dilated esophagus.    Partially visualized intra-abdominal organs are unremarkable.    Osseous structures demonstrate no evidence for acute fracture or osseous destructive lesion.  Degenerative change.    Airways are patent.  Similar bibasilar bronchiectasis and bronchiolectasis.    Chronic appearing changes of the lungs, noting scattered areas of ground-glass attenuation, subpleural reticulation, and honeycombing with peripheral and basilar predominance.  Increased conspicuity of scattered ground-glass, subpleural reticulation, and cicatricial atelectasis most notably about the medial right upper lobe, lingula, and right middle lobe.    1.0 cm irregular nodule about the right upper lobe (series 5, image 216), unchanged from comparison CT 02/17/2025.    Irregular nodule about the right upper lobe measuring approximately 1.5 cm (series 5, image 180) increased in conspicuity, with diffuse increase in surrounding parenchymal fibrotic change.    Similar appearance of  an irregular appearing left upper lobe nodule measuring approximately 1.6 cm (series 5, image 140).    New area of ground-glass attenuation left lung apex measuring approximately 1.9 cm (series 5, image 72).    Multiple scattered additional pulmonary nodules which are overall similar from comparison imaging, many of which demonstrate tree-in-bud distribution.    Impression  Diffuse peripheral and basal predominant fibrotic changes of the lungs, with interval increase in conspicuity of involvement involving the posterior aspect of the right upper lobe, right middle lobe, and lingula from comparison CT 02/17/2025.  Correlation is advised.    Multiple irregular appearing nodules, the largest in the right upper lobe measuring approximately 1.5 cm, and the largest in the left upper lobe measuring approximately 1.6 cm.  Findings are largely similar from comparison CT 02/17/2025, noting that increase in fibrotic changes about the medial right upper lobe limits evaluation of right upper lobe nodule.  Attention on follow-up imaging is recommended.    New area of ground-glass attenuation left lung apex measuring approximately 1.8 cm.  Attention on follow-up imaging is recommended.    Additional numerous scattered pulmonary nodules and ground-glass opacities, some of which demonstrate tree-in-bud distribution, suggestive of small airways disease.    Additional stable findings as above.    This report was flagged in Epic as abnormal.      Electronically signed by: David Joaquin  Date:    05/26/2025  Time:    16:37      Cardiodiagnostics:    Echo    Interpretation Summary    Left Ventricle: The left ventricle is normal in size. Normal wall thickness. Normal wall motion. There is normal systolic function with a visually estimated ejection fraction of 60 - 65%. There is normal diastolic function.    Right Ventricle: Normal right ventricular cavity size. Wall thickness is normal. Right ventricle wall motion  is normal. Systolic  function is normal.    Pulmonary Artery: The estimated pulmonary artery systolic pressure is 40 mmHg.    Results for orders placed during the hospital encounter of 03/15/24    Echo    Interpretation Summary    Left Ventricle: The left ventricle is normal in size. Normal wall thickness. Normal wall motion. There is normal systolic function with a visually estimated ejection fraction of 55 - 60%. There is normal diastolic function.    Right Ventricle: Normal right ventricular cavity size. Wall thickness is normal. Right ventricle wall motion  is normal. Systolic function is normal.    The left atrium is moderately dilated.    Tricuspid Valve: There is mild regurgitation.    Pulmonary Artery: The estimated pulmonary artery systolic pressure is 43 mmHg.    IVC/SVC: Intermediate venous pressure at 8 mmHg.    Results for orders placed during the hospital encounter of 11/22/24    Cardiac catheterization    Conclusion    The estimated blood loss was <50 mL.    Stable on current therapy. No changes, followup with pulmonary hypertension team.    The procedure log was documented by Documenter: Estee Paul RT and verified by Tamanna Martins MD.    Date: 11/22/2024  Time: 10:23 AM        Assessment:  1. ILD (interstitial lung disease)    2. Scleroderma with pulmonary involvement    3. Abnormal CT of the chest    4. WHO group 1 pulmonary arterial hypertension    5. High risk medication use      Plan:     Followed for SSc-ILD with PAH. On MMF (on hold for URTI) and OFEV.  FVC down 9% from previous with recent infectious symptoms.  Completed levaquin.  Sputum culture negative.  Can resume MMF now that abx are complete.  CT chest with multiple new abnormalities concerning for infectious process.  She just completed abx and will repeat CT in 6 weeks to allow for radiographic resolution if it's all infectious.  If CT remains abnormal, will discuss needs for bronch and BAL to further assess.  Continue with OFEV; LFTs  reviewed    Continue Adcirca and PH follow-up. Continue Lasix per PH team for lower extremity edema.        Continue follow up with rheum.  On MMF (can resume now abx are complete).       5. RTC in 3 months or sooner if needed with repeat PFTs.       Roberta Leigh D.O.  Pulmonary/Critical Care and Lung Transplantation  [facility]

## 2025-05-28 NOTE — PROGRESS NOTES
History & Physical    SUBJECTIVE:     History of Present Illness:  Patient is a 74 y.o. female w hx of scleroderma w lung involvement, robotic repair of right inguinal and femoral hernia repair w Dr Segura on 5/26/23. Comes in today for evaluation of possible recurrence.  Has noticed a groin bulge for months. Improved a little with some antibiotics that her PCP gave her. States that the right groin is tender to the touch. Is bothered when she wears jeans.       Review of patient's allergies indicates:   Allergen Reactions    Sulfamethizole     Sulfamethoxazole      Other Reaction(s): Tramadol    rash    Spironolactone Tinitus    Sulfa (sulfonamide antibiotics) Rash     Other reaction(s): Rash    Other reaction(s): Rash   Other reaction(s): Rash    Tramadol Itching and Rash       Current Medications[1]    Past Medical History:   Diagnosis Date    Abnormal Pap smear     Acid reflux     Allergy     Arthritis     Encounter for blood transfusion     GERD (gastroesophageal reflux disease)     Hiatal hernia 03/24/2023    History of migraine headaches     Hx of colonic polyp     Hypertension     Idiopathic neuropathy 07/20/2012    ILD (interstitial lung disease) 11/06/2013    Iron deficiency anemia 03/18/2014    MRSA carrier     Osteopenia     Pneumonia     Pulmonary fibrosis     Pulmonary hypertension     Raynaud's disease     Scleroderma, diffuse     Sjogren's syndrome     Vitamin D deficiency 11/14/2013     Past Surgical History:   Procedure Laterality Date    24 HOUR IMPEDANCE PH MONITORING OF ESOPHAGUS IN PATIENT NOT TAKING ACID REDUCING MEDICATIONS N/A 03/04/2020    Procedure: IMPEDANCE PH STUDY, ESOPHAGEAL, 24 HOUR, IN PATIENT NOT TAKING ACID REDUCING MEDICATION;  Surgeon: Annamaria Mendoza MD;  Location: [facility] ([address] [place] [address]);  Service: Endoscopy;  Laterality: N/A;  OFF PPI/H2 Blocker   Motility Studies   Hold Narcotics x 1 days   Hold TCA x 1 days  2/26 - LVM attempting to confirm  appt  2/27 - Confirmed appt    ANORECTAL MANOMETRY N/A 05/10/2021    Procedure: MANOMETRY, ANORECTAL with balloon expulsion test;  Surgeon: Annamaria Mendoza MD;  Location: [facility] ENDO (4TH FLR);  Service: Endoscopy;  Laterality: N/A;  order combined  covid test 5/7 Denominational, instructions emailed-KPvt    BREAST BIOPSY      Left, benign    BRONCHOSCOPY N/A 10/2/2023    Procedure: bronch;  Surgeon: Roberta Leigh DO;  Location: [facility] OR 2ND FLR;  Service: Endoscopy;  Laterality: N/A;    BRONCHOSCOPY N/A 9/16/2024    Procedure: Flexible bronchoscopy (BAL only);  Surgeon: Roberta Leigh DO;  Location: [facility] OR 2ND FLR;  Service: Endoscopy;  Laterality: N/A;    CERVICAL CONIZATION   W/ LASER  1970    COLONOSCOPY      COLONOSCOPY N/A 03/29/2019    Procedure: COLONOSCOPY;  Surgeon: Annamaria Mendoza MD;  Location: [facility] (2ND FLR);  Service: Endoscopy;  Laterality: N/A;    COLONOSCOPY N/A 05/10/2021    Procedure: COLONOSCOPY;  Surgeon: Annamaria Mendoza MD;  Location: [facility] ENDO (4TH FLR);  Service: Endoscopy;  Laterality: N/A;  2nd floor-previous scopes done on 2nd floor, gastroparesis  full liquid diet x2 days, clear liquid x1 day prior to procedure  covid test 5/7 Denominational, instructions emailed-KPvt  5/6 pt confirmed appt-KPvt    DILATION AND CURETTAGE OF UTERUS      ESOPHAGEAL MANOMETRY WITH MEASUREMENT OF IMPEDANCE N/A 03/04/2020    Procedure: MANOMETRY, ESOPHAGUS, WITH IMPEDANCE MEASUREMENT;  Surgeon: Annamaria Mendoza MD;  Location: [facility] (4TH FLR);  Service: Endoscopy;  Laterality: N/A;  OFF PPI/H2 Blocker   Motility Studies   Hold Narcotics x 1 days   Hold TCA x 1 days    ESOPHAGOGASTRODUODENOSCOPY      ESOPHAGOGASTRODUODENOSCOPY N/A 03/29/2019    Procedure: EGD (ESOPHAGOGASTRODUODENOSCOPY);  Surgeon: Annamaria Mendoza MD;  Location: [facility] ENDO (2ND FLR);  Service: Endoscopy;  Laterality: N/A;  pulmonary htn    ESOPHAGOGASTRODUODENOSCOPY N/A 02/02/2021    Procedure:  ESOPHAGOGASTRODUODENOSCOPY (EGD);  Surgeon: Annamaria Mendoza MD;  Location: [facility] ENDO (2ND FLR);  Service: Endoscopy;  Laterality: N/A;  2nd floor gastroparesis  3 days full liquid diet and 1 day clears  covid test 1/30 primary care, instructions sent to [facility]    ESOPHAGOGASTRODUODENOSCOPY N/A 07/01/2022    Procedure: ESOPHAGOGASTRODUODENOSCOPY (EGD);  Surgeon: Annamaria Mendoza MD;  Location: [facility] ENDO (2ND FLR);  Service: Endoscopy;  Laterality: N/A;  2nd floor-gastroparesis  full liquid diet x3 days, clear liquid diet x1 day prior to procedure  fully vaccinated, instructions sent to [facility]  6[facility]29-[facility] [place]    EXCISION, LESION, STOMACH, LAPAROSCOPIC N/A 03/23/2023    Procedure: XI ROBOTIC EXCISION, LESION, STOMACH;  Surgeon: Katherine Segura MD;  Location: [facility] OR [address] [place] [address];  Service: General;  Laterality: N/A;    EXCISIONAL BIOPSY, LYMPH NODE Right 05/26/2023    Procedure: EXCISIONAL BIOPSY, LYMPH NODE, INGUINAL;  Surgeon: Katherine Segura MD;  Location: [facility] OR [address] [place] [address];  Service: General;  Laterality: Right;    HYSTERECTOMY  1990    FRANDY (AUB, Fibroids), ovaries remain    REPAIR, HERNIA, UMBILICAL N/A 03/23/2023    Procedure: REPAIR, HERNIA, UMBILICAL;  Surgeon: Katherine Segura MD;  [facility];  Service: General;  Laterality: N/A;    RIGHT HEART CATHETERIZATION Right 01/05/2021    Procedure: INSERTION, CATHETER, RIGHT HEART;  Surgeon: Aureliano Sy MD;  Location: [facility] CATH LAB;  Service: Cardiology;  Laterality: Right;    RIGHT HEART CATHETERIZATION Right 03/16/2023    Procedure: INSERTION, CATHETER, RIGHT HEART;  Surgeon: Aureliano Sy MD;  Location: [facility] CATH LAB;  Service: Cardiology;  Laterality: Right;    RIGHT HEART CATHETERIZATION Right 11/22/2024    Procedure: INSERTION, CATHETER, RIGHT HEART;  Surgeon: Tamanna Martins MD;  Location: [facility] CATH LAB;  Service: Cardiology;  Laterality: Right;     ROBOT-ASSISTED LAPAROSCOPIC REPAIR OF INGUINAL HERNIA USING DA VERNELL XI Right 05/26/2023    Procedure: XI ROBOTIC REPAIR, HERNIA, INGUINAL, FEMORAL;  Surgeon: Katherine Segura MD;  Location: [facility] OR 2ND FLR;  Service: General;  Laterality: Right;    ROBOT-ASSISTED REPAIR OF HIATAL HERNIA USING DA VERNELL XI N/A 03/23/2023    Procedure: XI ROBOTIC REPAIR, HERNIA, HIATAL;  Surgeon: Katherine Segura MD;  Location: [facility] OR 2ND FLR;  Service: General;  Laterality: N/A;    VARICOSE VEIN SURGERY      XI ROBOTIC GASTROPEXY N/A 03/23/2023    Procedure: XI ROBOTIC GASTROPEXY;  Surgeon: Katherine Segura MD;  Location: [facility] OR 2ND FLR;  Service: General;  Laterality: N/A;    XI ROBOTIC PYLOROMYOTOMY N/A 03/23/2023    Procedure: XI ROBOTIC PYLOROMYOTOMY;  Surgeon: Katherine Segura MD;  Location: [facility] OR 2ND FLR;  Service: General;  Laterality: N/A;     Family History   Problem Relation Name Age of Onset    Breast cancer Mother Tiki Singh     Cancer Mother Tiki Singh         Breast    Hypertension Father Madi     Diabetes Father Madi         Amputation of legs    Breast cancer Sister Brenda     Diabetes Sister Brenda     Arthritis Sister Brenda     Cancer Sister Brenda         Breast    Osteoarthritis Brother Luis     Diabetes Brother Luis     Arthritis Brother Luis     Birth defects Brother Luis         Polio at birth, recently had knee replacement surgery on left knee    No Known Problems Daughter      No Known Problems Daughter      No Known Problems Son      No Known Problems Son      Breast cancer Maternal Aunt Gege     Cancer Maternal Aunt Gege         Breast    Early death Sister Philomena     Melanoma Neg Hx      Colon cancer Neg Hx      Crohn's disease Neg Hx      Stomach cancer Neg Hx      Ulcerative colitis Neg Hx      Rectal cancer Neg Hx      Irritable bowel syndrome Neg Hx      Esophageal cancer Neg Hx      Celiac disease Neg Hx       "Ovarian cancer Neg Hx      Liver cancer Neg Hx      Pancreatic cancer Neg Hx       Social History[2]     Review of Systems:  Review of Systems   Constitutional:  Negative for activity change and appetite change.   Respiratory:  Negative for shortness of breath.    Cardiovascular:  Negative for chest pain.   Gastrointestinal:  Negative for abdominal pain.     OBJECTIVE:     Vital Signs (Most Recent)     5' 5" (1.651 m)  63.5 kg (139 lb 15.9 oz)     Physical Exam:  Physical Exam  Vitals and nursing note reviewed.   Constitutional:       Appearance: Normal appearance. She is not ill-appearing.   HENT:      Head: Normocephalic.      Mouth/Throat:      Mouth: Mucous membranes are moist.      Pharynx: Oropharynx is clear.   Eyes:      General: No scleral icterus.     Extraocular Movements: Extraocular movements intact.      Conjunctiva/sclera: Conjunctivae normal.   Cardiovascular:      Rate and Rhythm: Normal rate.      Pulses: Normal pulses.   Pulmonary:      Effort: Pulmonary effort is normal. No respiratory distress.      Breath sounds: No wheezing.   Abdominal:      General: Abdomen is flat. There is no distension.      Palpations: Abdomen is soft.   Musculoskeletal:         General: No swelling or tenderness. Normal range of motion.      Cervical back: Normal range of motion.      Comments: Bilateral 3-4cm firm mass (likely lymph node), R>L. R side tender   Skin:     General: Skin is warm and dry.      Coloration: Skin is not jaundiced or pale.   Neurological:      General: No focal deficit present.      Mental Status: She is alert and oriented to person, place, and time.   Psychiatric:         Mood and Affect: Mood normal.       Laboratory  CBC: Reviewed  CMP: Reviewed  Albumin 2.8    Diagnostic Results:  US: Reviewed  2/26/25: no evidence of femoral hernia; inguinal lymphadenopathy present    ASSESSMENT/PLAN:     73yo f w scleroderma + previous robo R inguinal hernia/femoral hernia repair in May 23 who presents " with groin bulge     PLAN:Plan     CT pelvis (to include R thigh) w IV contrast to evaluate for femoral hernia recurrence  Will follow up pending results         Case discussed with Dr Segura.       CARLITA Torres MD  [facility], PGY-3                    [1]  Current Outpatient Medications   Medication Sig Dispense Refill    0.9 % sodium chloride (SODIUM CHLORIDE 0.9%) 0.9 % flush 10 mL 3 TIMES DAILY (route: injection)      acetaminophen-codeine 300-60mg (TYLENOL #4) 300-60 mg Tab 1 tablet BEDTIME (route: oral)      acetaminophen-codeine 300-60mg (TYLENOL #4) 300-60 mg Tab Take 1 tablet by mouth every evening. 30 tablet 0    albuterol (VENTOLIN HFA) 90 mcg/actuation inhaler Inhale 2 puffs into the lungs every 6 (six) hours as needed for Wheezing. Rescue 8.5 g 11    ammonium lactate 12 % Crea Apply 20 g topically once daily. 770 g 2    colistimethate (COLYMYCIN) 150 mg injection       DULoxetine (CYMBALTA) 60 MG capsule Take 1 capsule (60 mg total) by mouth 2 (two) times daily. 60 capsule 11    ergocalciferol (ERGOCALCIFEROL) 50,000 unit Cap Take 1 capsule (50,000 Units total) by mouth every 7 days. 12 capsule 3    furosemide (LASIX) 20 MG tablet Take 1 tablet (20 mg total) by mouth once daily. 30 tablet 5    furosemide (LASIX) 20 MG tablet Take 20 mg by mouth.      heparin sodium,porcine (HEPARIN LOCK FLUSH IV)       meropenem (MERREM) 1 gram injection       multivitamin capsule Take 1 capsule by mouth once daily.      mycophenolate mofetil (CELLCEPT) 200 mg/mL SusR Take 5 mLs (1,000 mg total) by mouth 2 (two) times daily. 480 mL 1    nabumetone (RELAFEN) 750 MG tablet Take 1 tablet (750 mg total) by mouth daily as needed for Pain. 30 tablet 3    nabumetone (RELAFEN) 750 MG tablet Take 750 mg by mouth.      nebulizer and compressor Rosemary Use as directed 1 each 0    NIFEdipine (PROCARDIA-XL) 90 MG (OSM) 24 hr tablet Take 90 mg by mouth.      OFEV 100 mg Cap TAKE 1 CAPSULE BY MOUTH 2  TIMES A DAY 60 capsule 11    pravastatin (PRAVACHOL) 20 MG tablet Take 1 tablet (20 mg total) by mouth every evening. 90 tablet 3    pregabalin (LYRICA) 300 MG Cap 1 capsule 2 TIMES DAILY (route: oral)      pregabalin (LYRICA) 300 MG Cap Take 1 capsule (300 mg total) by mouth 2 (two) times daily. 60 capsule 6    PREVIDENT 5000 BOOSTER PLUS 1.1 % Pste SMARTSIG:To Teeth      promethazine-codeine 6.25-10 mg/5 ml (PHENERGAN WITH CODEINE) 6.25-10 mg/5 mL syrup Take 5 mLs by mouth every 8 (eight) hours as needed for Cough. 118 mL 0    RABEprazole (ACIPHEX) 20 mg tablet Take 1 tablet (20 mg total) by mouth 2 (two) times daily. 60 tablet 11    RABEprazole (ACIPHEX) 20 mg tablet Take 20 mg by mouth 2 (two) times daily.       KIT, STERILE MISC USE 10 ML FOR MEDICATION ADMINISTRATION WITH SPRAY BOTTLE- DIRECTIONS FOR USE (SK), #2 30ML SPRAY BOTTLES, #1 FUNNEL, SALINE 5ML VIALS #300ML      tadalafil (ADCIRCA) 20 mg Tab Take 2 tablets (40 mg total) by mouth once daily. 60 tablet 11    tadalafiL (CIALIS) 20 MG Tab Take 20 mg by mouth.      tiZANidine (ZANAFLEX) 4 MG tablet Take 2 tablets (8 mg total) by mouth 2 (two) times daily as needed (muscle pain). 60 tablet 3    tiZANidine 4 mg Cap 2 tablet 2 TIMES DAILY (route: oral)      TOBRAMYCIN SULFATE, BULK, MISC MIX 2 GRAMS OF POWDER WITH DILUENT. APPLY TO AFFECTED AREAS. PERFORM ONCE DAILY.       No current facility-administered medications for this visit.     Facility-Administered Medications Ordered in Other Visits   Medication Dose Route Frequency Provider Last Rate Last Admin    fentaNYL 50 mcg/mL injection 25 mcg  25 mcg Intravenous Q5 Min PRN Alanis Khadijah, DO        haloperidol lactate injection 0.5 mg  0.5 mg Intravenous Q10 Min PRN Alanis Khadijah, DO        sodium chloride 0.9% flush 10 mL  10 mL Intravenous PRN Khadijah Thapa, DO       [2]  Social History  Tobacco Use    Smoking status: Never     Passive exposure: Never    Smokeless  tobacco: Never   Vaping Use    Vaping status: Never Used   Substance Use Topics    Alcohol use: Not Currently    Drug use: No

## 2025-05-28 NOTE — TELEPHONE ENCOUNTER
Received the below message from Dr. Leigh:    Roberta Leigh, DO to Me  (Selected Message)    5/28/25  7:48 AM  Result Note  No changes to OFEV.  Holding MMF currently due to URTI.  Can resume this weekend now that abx are complete      Contacted patient and notified her of the lab results and Dr. Leigh's instructions to continue her current dose of OFEV. Reminded her to resume the mycophenolate this weekend as previously instructed. Informed patient that her primary coordinator will contact her regarding repeat labs. She verbalized her understanding of all discussed.

## 2025-05-29 ENCOUNTER — TELEPHONE (OUTPATIENT)
Dept: TRANSPLANT | Facility: CLINIC | Age: 75
End: 2025-05-29
Payer: MEDICARE

## 2025-05-29 DIAGNOSIS — M34.9 SCLERODERMA WITH PULMONARY INVOLVEMENT: ICD-10-CM

## 2025-05-29 DIAGNOSIS — J84.9 ILD (INTERSTITIAL LUNG DISEASE): Primary | ICD-10-CM

## 2025-05-29 DIAGNOSIS — R93.89 ABNORMAL CHEST CT: ICD-10-CM

## 2025-05-29 DIAGNOSIS — Z79.899 HIGH RISK MEDICATION USE: ICD-10-CM

## 2025-05-29 DIAGNOSIS — K21.9 GASTROESOPHAGEAL REFLUX DISEASE, UNSPECIFIED WHETHER ESOPHAGITIS PRESENT: ICD-10-CM

## 2025-05-29 RX ORDER — RABEPRAZOLE SODIUM 20 MG/1
20 TABLET, DELAYED RELEASE ORAL 2 TIMES DAILY
Qty: 60 TABLET | Refills: 11 | Status: SHIPPED | OUTPATIENT
Start: 2025-05-29

## 2025-05-29 NOTE — TELEPHONE ENCOUNTER
Per Dr. Leigh, repeat CT chest in 6 weeks with PFTs and 6MWT. Request to .  Also, plan for follow up August 2025 PFTs, CBC, CMP.

## 2025-05-29 NOTE — TELEPHONE ENCOUNTER
Received a fax from [facility] requesting a prescription refill for patients-  Rabeprazole (Aciphex) 20mg   show

## 2025-05-30 ENCOUNTER — OFFICE VISIT (OUTPATIENT)
Dept: PODIATRY | Facility: CLINIC | Age: 75
End: 2025-05-30
Payer: MEDICARE

## 2025-05-30 ENCOUNTER — LAB VISIT (OUTPATIENT)
Dept: LAB | Facility: HOSPITAL | Age: 75
End: 2025-05-30
Attending: SURGERY
Payer: MEDICARE

## 2025-05-30 VITALS — HEIGHT: 65 IN | BODY MASS INDEX: 23.3 KG/M2

## 2025-05-30 DIAGNOSIS — K41.90 FEMORAL HERNIA OF RIGHT SIDE: ICD-10-CM

## 2025-05-30 DIAGNOSIS — L97.912 ULCERS OF BOTH LOWER EXTREMITIES WITH FAT LAYER EXPOSED: Primary | ICD-10-CM

## 2025-05-30 DIAGNOSIS — L97.922 ULCERS OF BOTH LOWER EXTREMITIES WITH FAT LAYER EXPOSED: Primary | ICD-10-CM

## 2025-05-30 DIAGNOSIS — L03.116 CELLULITIS OF LEFT LOWER EXTREMITY: ICD-10-CM

## 2025-05-30 LAB
CREAT SERPL-MCNC: 0.6 MG/DL (ref 0.5–1.4)
GFR SERPLBLD CREATININE-BSD FMLA CKD-EPI: >60 ML/MIN/1.73/M2

## 2025-05-30 PROCEDURE — 99214 OFFICE O/P EST MOD 30 MIN: CPT | Mod: PBBFAC | Performed by: STUDENT IN AN ORGANIZED HEALTH CARE EDUCATION/TRAINING PROGRAM

## 2025-05-30 PROCEDURE — 99999 PR PBB SHADOW E&M-EST. PATIENT-LVL IV: CPT | Mod: PBBFAC,,, | Performed by: STUDENT IN AN ORGANIZED HEALTH CARE EDUCATION/TRAINING PROGRAM

## 2025-05-30 PROCEDURE — 36415 COLL VENOUS BLD VENIPUNCTURE: CPT | Mod: TXP

## 2025-05-30 PROCEDURE — 82565 ASSAY OF CREATININE: CPT | Mod: TXP

## 2025-05-30 RX ORDER — NIFEDIPINE 30 MG/1
30 TABLET, EXTENDED RELEASE ORAL
COMMUNITY
Start: 2025-05-28

## 2025-05-30 NOTE — PROGRESS NOTES
Subjective:     Patient    Yamel Shah is a 74 y.o. female.    Problem    2024: Previously seen by multiple wound care providers without significant improvement in wounds, has bounced around a lot. Has bilateral foot wounds related to scleroderma and immunosuppressant medications. Had improvement in granulation with regular wound care, oral and topical antibiotics, one debridement. Attempted topical vitamin E but caused burning.     01/03/25: Returns for bilateral foot wound care. No new concerns. Had pain flare on left foot, still sensitive.     01/08/25: Returns for bilateral foot wound care. No new concerns.     01/16/25: Returns for bilateral foot wound care. Awaiting appointment with ID 02/14. No new concerns.     01/24/25: Returns for bilateral foot wound care. Pain controlled on current medications. No new concerns.     01/31/25: Returns for bilateral foot wound care. Not tolerating amitriptyline well; still with nerve pain, tingling, cramping, burning despite duloxetine, tolerates duloxetine well.     02/07/25: Returns for bilateral foot wound care. Increased duloxetine at last visit for BLE nerve pain with significant improvement in pain without side effects. ID appointment rescheduled to 02/24.     02/14/25: Returns for bilateral foot wound care. Wants to restart PO antibiotics until she sees ID, thinks she was progressing better at that time.     02/21/25: Returns for bilateral foot wound care. Wounds seem somewhat better on PO antibiotics, ID appointment next week.     02/25/25: Returns for bilateral foot wound care. Completed recent PO antibiotics, recent cultures + for Pseudomonas resistant to PO antibiotics; has first ID appointment tomorrow.     03/07/25: Returns for bilateral foot wound care. Getting OPAT per ID soon. Pain OK.     03/12/25: Returns for bilateral foot wound care. Will start antibiotics within next week. Pain OK except severe at left 2nd toe dorsally. Wants  debridement of left 2nd toe today.     03/19/25: Returns for bilateral foot wound care. On IV antibiotics. No new concerns.     03/27/25: Returns for bilateral foot wound care. Still on IV antibiotics. No new concerns.     04/04/25: Returns for bilateral foot wound care. Stopped IV antibiotics but still has the line in. Going on vacation for 2 weeks soon.     04/25/25: Returns for bilateral foot wound care. Newer cultures with polymicrobial growth, ID not concerned. Does not feel wounds significantly improved with IV antibiotics.     05/01/25: Returns for bilateral foot wound care. Awaiting topical antibiotics from [facility].     05/30/25: Returns for bilateral foot wound care. Has received antibiotics from [facility]. Seen by Dr Ledbetter 1 week ago for dressing change. Wounds improving drastically.     History    History obtained from patient and review of medical records.     Past Medical History:   Diagnosis Date    Abnormal Pap smear     Acid reflux     Allergy     Arthritis     Encounter for blood transfusion     GERD (gastroesophageal reflux disease)     Hiatal hernia 03/24/2023    History of migraine headaches     Hx of colonic polyp     Hypertension     Idiopathic neuropathy 07/20/2012    ILD (interstitial lung disease) 11/06/2013    Iron deficiency anemia 03/18/2014    MRSA carrier     Osteopenia     Pneumonia     Pulmonary fibrosis     Pulmonary hypertension     Raynaud's disease     Scleroderma, diffuse     Sjogren's syndrome     Vitamin D deficiency 11/14/2013       Past Surgical History:   Procedure Laterality Date    24 HOUR IMPEDANCE PH MONITORING OF ESOPHAGUS IN PATIENT NOT TAKING ACID REDUCING MEDICATIONS N/A 03/04/2020    Procedure: IMPEDANCE PH STUDY, ESOPHAGEAL, 24 HOUR, IN PATIENT NOT TAKING ACID REDUCING MEDICATION;  Surgeon: Annamaria Mendoza MD;  Location: [facility] ([address] [place] [address]);  Service: Endoscopy;  Laterality: N/A;  OFF PPI/H2 Blocker   Motility Studies   Hold Narcotics x 1 days    Hold TCA x 1 days  2/26 - LVM attempting to confirm appt  2/27 - Confirmed appt    ANORECTAL MANOMETRY N/A 05/10/2021    Procedure: MANOMETRY, ANORECTAL with balloon expulsion test;  Surgeon: Annamaria Mendoza MD;  Location: [facility] (4TH FLR);  Service: Endoscopy;  Laterality: N/A;  order combined  covid test 5/7 [facility], instructions emailed-KPvt    BREAST BIOPSY      Left, benign    BRONCHOSCOPY N/A 10/2/2023    Procedure: bronch;  Surgeon: Roberta Leigh DO;  Location: [facility] OR 2ND FLR;  Service: Endoscopy;  Laterality: N/A;    BRONCHOSCOPY N/A 9/16/2024    Procedure: Flexible bronchoscopy (BAL only);  Surgeon: Roberta Leigh DO;  Location: [facility] OR 2ND FLR;  Service: Endoscopy;  Laterality: N/A;    CERVICAL CONIZATION   W/ LASER  1970    COLONOSCOPY      COLONOSCOPY N/A 03/29/2019    Procedure: COLONOSCOPY;  Surgeon: Annamaria Mendoza MD;  Location: [facility] (2ND FLR);  Service: Endoscopy;  Laterality: N/A;    COLONOSCOPY N/A 05/10/2021    Procedure: COLONOSCOPY;  Surgeon: Annamaria Mendoza MD;  Location: [facility] (4TH FLR);  Service: Endoscopy;  Laterality: N/A;  2nd floor-previous scopes done on 2nd floor, gastroparesis  full liquid diet x2 days, clear liquid x1 day prior to procedure  covid test 5/7 [facility], instructions emailed-KPvt  5/6 pt confirmed appt-KPvt    DILATION AND CURETTAGE OF UTERUS      ESOPHAGEAL MANOMETRY WITH MEASUREMENT OF IMPEDANCE N/A 03/04/2020    Procedure: MANOMETRY, ESOPHAGUS, WITH IMPEDANCE MEASUREMENT;  Surgeon: Annamaria Mendoza MD;  Location: [facility] ENDO (4TH FLR);  Service: Endoscopy;  Laterality: N/A;  OFF PPI/H2 Blocker   Motility Studies   Hold Narcotics x 1 days   Hold TCA x 1 days    ESOPHAGOGASTRODUODENOSCOPY      ESOPHAGOGASTRODUODENOSCOPY N/A 03/29/2019    Procedure: EGD (ESOPHAGOGASTRODUODENOSCOPY);  Surgeon: Annamaria Mendoza MD;  Location: [facility] ENDO (2ND FLR);  Service: Endoscopy;  Laterality: N/A;  pulmonary htn    ESOPHAGOGASTRODUODENOSCOPY N/A  02/02/2021    Procedure: ESOPHAGOGASTRODUODENOSCOPY (EGD);  Surgeon: Annamaria Mendoza MD;  Location: [facility] ENDO (2ND FLR);  Service: Endoscopy;  Laterality: N/A;  2nd floor gastroparesis  3 days full liquid diet and 1 day clears  covid test 1/30 primary care, instructions sent to [facility]    ESOPHAGOGASTRODUODENOSCOPY N/A 07/01/2022    Procedure: ESOPHAGOGASTRODUODENOSCOPY (EGD);  Surgeon: Annamaria Mendoza MD;  Location: [facility] ENDO (2ND FLR);  Service: Endoscopy;  Laterality: N/A;  2nd floor-gastroparesis  full liquid diet x3 days, clear liquid diet x1 day prior to procedure  fully vaccinated, instructions sent to [facility]  6/29-pt confirmed new arrival time-[facility]    EXCISION, LESION, STOMACH, LAPAROSCOPIC N/A 03/23/2023    Procedure: XI ROBOTIC EXCISION, LESION, STOMACH;  Surgeon: Katherine Segura MD;  [facility];  Service: General;  Laterality: N/A;    EXCISIONAL BIOPSY, LYMPH NODE Right 05/26/2023    Procedure: EXCISIONAL BIOPSY, LYMPH NODE, INGUINAL;  Surgeon: Katherine Segura MD;  [facility];  Service: General;  Laterality: Right;    HYSTERECTOMY  1990    FRANDY (AUB, Fibroids), ovaries remain    REPAIR, HERNIA, UMBILICAL N/A 03/23/2023    Procedure: REPAIR, HERNIA, UMBILICAL;  Surgeon: Katherine Segura MD;  [facility];  Service: General;  Laterality: N/A;    RIGHT HEART CATHETERIZATION Right 01/05/2021    Procedure: INSERTION, CATHETER, RIGHT HEART;  Surgeon: Aureliano Sy MD;  Location: [facility] CATH LAB;  Service: Cardiology;  Laterality: Right;    RIGHT HEART CATHETERIZATION Right 03/16/2023    Procedure: INSERTION, CATHETER, RIGHT HEART;  Surgeon: Aureliano Sy MD;  Location: [facility] CATH LAB;  Service: Cardiology;  Laterality: Right;    RIGHT HEART CATHETERIZATION Right 11/22/2024    Procedure: INSERTION, CATHETER, RIGHT HEART;  Surgeon: Tamanna Martins MD;  Location: [facility] CATH LAB;  Service: Cardiology;  Laterality:  Right;    ROBOT-ASSISTED LAPAROSCOPIC REPAIR OF INGUINAL HERNIA USING DA VERNELL XI Right 2023    Procedure: XI ROBOTIC REPAIR, HERNIA, INGUINAL, FEMORAL;  Surgeon: Katherine Segura MD;  Location: [facility] OR 2ND FLR;  Service: General;  Laterality: Right;    ROBOT-ASSISTED REPAIR OF HIATAL HERNIA USING DA VERNELL XI N/A 2023    Procedure: XI ROBOTIC REPAIR, HERNIA, HIATAL;  Surgeon: Katherine Segura MD;  Location: [facility] OR 2ND FLR;  Service: General;  Laterality: N/A;    VARICOSE VEIN SURGERY      XI ROBOTIC GASTROPEXY N/A 2023    Procedure: XI ROBOTIC GASTROPEXY;  Surgeon: Katherine Segura MD;  Location: [facility] OR 2ND FLR;  Service: General;  Laterality: N/A;    XI ROBOTIC PYLOROMYOTOMY N/A 2023    Procedure: XI ROBOTIC PYLOROMYOTOMY;  Surgeon: Katherine Segura MD;  Location: [facility] OR 2ND FLR;  Service: General;  Laterality: N/A;        Objective:     Vitals  Wt Readings from Last 1 Encounters:   25 63.5 kg (139 lb 15.9 oz)     Temp Readings from Last 1 Encounters:   25 97.9 °F (36.6 °C) (Oral)     BP Readings from Last 1 Encounters:   25 125/75     Pulse Readings from Last 1 Encounters:   25 76       Dermatological Exam    Skin:  Skin of both feet thickened (scleroderma), stiff, with extensive ulcerations of both feet down to fat with slowly improving granulation; chronic low level infection, xerosis  Tender hypergranular tissue at dorsal 2nd toe PIPJ less prominent s/p debridement; wounds epithelializing    Nails:  10 nail(s) thickened, and 10 nail(s) discolored    Vascular Exam    Arteries:  Posterior tibial artery palpable on right  Dorsalis pedis artery palpable on right  Posterior tibial artery palpable on left  Dorsalis pedis artery palpable on left    Veins:  Superficial veins unremarkable on right  Superficial veins unremarkable on left    Swellin+ nonpitting on right  1+ nonpitting on left    Neurological Exam    [place] touch  test:  6/6 sites sensed, light touch intact     Musculoskeletal Exam    Footwear:  Casual on right  Casual on left    Gait Exam:   Ambulatory Status: Ambulatory  Gait: Normal  Assistive Devices: None    Foot Progression Angle:  Normal on right  Normal on left    Right Lower Extremity Additional Findings:  Right foot and ankle function, strength, and range of motion unremarkable except as noted above.     Left Lower Extremity Additional Findings:  2nd hammertoe  Left foot and ankle function, strength, and range of motion unremarkable except as noted above.    Imaging and Other Tests    Imagin/26/25 left foot X rays: 2nd hammertoe deformity, centered at PIPJ    Independently reviewed and interpreted imaging, findings are as follows: N/A     Assessment:     Encounter Diagnoses   Name Primary?    Ulcers of both lower extremities with fat layer exposed Yes    Cellulitis of left lower extremity                Plan:     I counseled the patient on her conditions, their implications and medical management.    Neuropathy: chronic stable  -Failed amitriptyline.   -Continue duloxetine 120 mg/day.      Bilateral ulcers, cellulitis, swelling: chronic stable    -Dressings changed. HH dressing changes.  orders updated.   -Protected WB in surgical shoes.      -Awaiting compounded topical antibiotics from [facility], to be used during dressing changes.     Scleroderma: chronic stable  -Continue PO tadalafil 40 mg/day.      I spent a total of 40 minutes on the day of the visit.  This includes face to face time and non-face to face time preparing to see the patient (eg, review of tests), obtaining and/or reviewing separately obtained history, documenting clinical information in the electronic or other health record, independently interpreting results and communicating results to the patient/family/caregiver, or care coordinator.     Return to clinic in 1-2 weeks, call sooner PRN.

## 2025-05-31 ENCOUNTER — DOCUMENT SCAN (OUTPATIENT)
Dept: HOME HEALTH SERVICES | Facility: HOSPITAL | Age: 75
End: 2025-05-31
Payer: MEDICARE

## 2025-06-04 ENCOUNTER — TELEPHONE (OUTPATIENT)
Dept: TRANSPLANT | Facility: CLINIC | Age: 75
End: 2025-06-04
Payer: MEDICARE

## 2025-06-05 ENCOUNTER — TELEPHONE (OUTPATIENT)
Dept: TRANSPLANT | Facility: CLINIC | Age: 75
End: 2025-06-05
Payer: MEDICARE

## 2025-06-05 DIAGNOSIS — I27.20 PULMONARY HYPERTENSION: ICD-10-CM

## 2025-06-05 DIAGNOSIS — I27.9 CHRONIC PULMONARY HEART DISEASE: ICD-10-CM

## 2025-06-05 DIAGNOSIS — J84.9 ILD (INTERSTITIAL LUNG DISEASE): ICD-10-CM

## 2025-06-05 DIAGNOSIS — M34.9 SCLERODERMA: ICD-10-CM

## 2025-06-05 RX ORDER — TADALAFIL 20 MG/1
40 TABLET ORAL DAILY
Qty: 60 TABLET | Refills: 11 | Status: SHIPPED | OUTPATIENT
Start: 2025-06-05

## 2025-06-09 DIAGNOSIS — G89.4 CHRONIC PAIN DISORDER: ICD-10-CM

## 2025-06-10 RX ORDER — ACETAMINOPHEN AND CODEINE PHOSPHATE 300; 60 MG/1; MG/1
1 TABLET ORAL NIGHTLY
Qty: 30 TABLET | Refills: 0 | Status: SHIPPED | OUTPATIENT
Start: 2025-06-21 | End: 2025-06-13

## 2025-06-13 ENCOUNTER — HOSPITAL ENCOUNTER (OUTPATIENT)
Dept: RADIOLOGY | Facility: HOSPITAL | Age: 75
Discharge: HOME | End: 2025-06-13
Attending: SURGERY
Payer: MEDICARE

## 2025-06-13 ENCOUNTER — OFFICE VISIT (OUTPATIENT)
Dept: PODIATRY | Facility: CLINIC | Age: 75
End: 2025-06-13
Payer: MEDICARE

## 2025-06-13 VITALS
DIASTOLIC BLOOD PRESSURE: 71 MMHG | BODY MASS INDEX: 23.3 KG/M2 | HEIGHT: 65 IN | HEART RATE: 97 BPM | SYSTOLIC BLOOD PRESSURE: 144 MMHG

## 2025-06-13 DIAGNOSIS — L03.116 CELLULITIS OF LEFT LOWER EXTREMITY: ICD-10-CM

## 2025-06-13 DIAGNOSIS — L97.912 ULCERS OF BOTH LOWER EXTREMITIES WITH FAT LAYER EXPOSED: Primary | ICD-10-CM

## 2025-06-13 DIAGNOSIS — L97.922 ULCERS OF BOTH LOWER EXTREMITIES WITH FAT LAYER EXPOSED: Primary | ICD-10-CM

## 2025-06-13 DIAGNOSIS — K41.90 FEMORAL HERNIA OF RIGHT SIDE: ICD-10-CM

## 2025-06-13 DIAGNOSIS — G89.4 CHRONIC PAIN DISORDER: ICD-10-CM

## 2025-06-13 PROCEDURE — 72193 CT PELVIS W/DYE: CPT | Mod: TC,TXP

## 2025-06-13 PROCEDURE — 25500020 PHARM REV CODE 255: Mod: TXP | Performed by: SURGERY

## 2025-06-13 PROCEDURE — 99214 OFFICE O/P EST MOD 30 MIN: CPT | Mod: PBBFAC,25 | Performed by: STUDENT IN AN ORGANIZED HEALTH CARE EDUCATION/TRAINING PROGRAM

## 2025-06-13 PROCEDURE — 99999 PR PBB SHADOW E&M-EST. PATIENT-LVL IV: CPT | Mod: PBBFAC,,, | Performed by: STUDENT IN AN ORGANIZED HEALTH CARE EDUCATION/TRAINING PROGRAM

## 2025-06-13 PROCEDURE — 72193 CT PELVIS W/DYE: CPT | Mod: 26,NTX,, | Performed by: RADIOLOGY

## 2025-06-13 RX ADMIN — IOHEXOL 75 ML: 350 INJECTION, SOLUTION INTRAVENOUS at 11:06

## 2025-06-13 NOTE — PROGRESS NOTES
Subjective:     Patient    Yamel Shah is a 74 y.o. female.    Problem    2024: Previously seen by multiple wound care providers without significant improvement in wounds, has bounced around a lot. Has bilateral foot wounds related to scleroderma and immunosuppressant medications. Had improvement in granulation with regular wound care, oral and topical antibiotics, one debridement. Attempted topical vitamin E but caused burning.     01/03/25: Returns for bilateral foot wound care. No new concerns. Had pain flare on left foot, still sensitive.     01/08/25: Returns for bilateral foot wound care. No new concerns.     01/16/25: Returns for bilateral foot wound care. Awaiting appointment with ID 02/14. No new concerns.     01/24/25: Returns for bilateral foot wound care. Pain controlled on current medications. No new concerns.     01/31/25: Returns for bilateral foot wound care. Not tolerating amitriptyline well; still with nerve pain, tingling, cramping, burning despite duloxetine, tolerates duloxetine well.     02/07/25: Returns for bilateral foot wound care. Increased duloxetine at last visit for BLE nerve pain with significant improvement in pain without side effects. ID appointment rescheduled to 02/24.     02/14/25: Returns for bilateral foot wound care. Wants to restart PO antibiotics until she sees ID, thinks she was progressing better at that time.     02/21/25: Returns for bilateral foot wound care. Wounds seem somewhat better on PO antibiotics, ID appointment next week.     02/25/25: Returns for bilateral foot wound care. Completed recent PO antibiotics, recent cultures + for Pseudomonas resistant to PO antibiotics; has first ID appointment tomorrow.     03/07/25: Returns for bilateral foot wound care. Getting OPAT per ID soon. Pain OK.     03/12/25: Returns for bilateral foot wound care. Will start antibiotics within next week. Pain OK except severe at left 2nd toe dorsally. Wants  debridement of left 2nd toe today.     03/19/25: Returns for bilateral foot wound care. On IV antibiotics. No new concerns.     03/27/25: Returns for bilateral foot wound care. Still on IV antibiotics. No new concerns.     04/04/25: Returns for bilateral foot wound care. Stopped IV antibiotics but still has the line in. Going on vacation for 2 weeks soon.     04/25/25: Returns for bilateral foot wound care. Newer cultures with polymicrobial growth, ID not concerned. Does not feel wounds significantly improved with IV antibiotics.     05/01/25: Returns for bilateral foot wound care. Awaiting topical antibiotics from [facility].     05/30/25: Returns for bilateral foot wound care. Has received antibiotics from [facility]. Seen by Dr Ledbetter 1 week ago for dressing change. Wounds improving drastically.     06/12/25: Returns for bilateral foot wound care. Wounds continue to improve, no new concerns.     History    History obtained from patient and review of medical records.     Past Medical History:   Diagnosis Date    Abnormal Pap smear     Acid reflux     Allergy     Arthritis     Encounter for blood transfusion     GERD (gastroesophageal reflux disease)     Hiatal hernia 03/24/2023    History of migraine headaches     Hx of colonic polyp     Hypertension     Idiopathic neuropathy 07/20/2012    ILD (interstitial lung disease) 11/06/2013    Iron deficiency anemia 03/18/2014    MRSA carrier     Osteopenia     Pneumonia     Pulmonary fibrosis     Pulmonary hypertension     Raynaud's disease     Scleroderma, diffuse     Sjogren's syndrome     Vitamin D deficiency 11/14/2013       Past Surgical History:   Procedure Laterality Date    24 HOUR IMPEDANCE PH MONITORING OF ESOPHAGUS IN PATIENT NOT TAKING ACID REDUCING MEDICATIONS N/A 03/04/2020    Procedure: IMPEDANCE PH STUDY, ESOPHAGEAL, 24 HOUR, IN PATIENT NOT TAKING ACID REDUCING MEDICATION;  Surgeon: Annamaria Mendzoa MD;  Location: [facility] ([address] [place] [address]);   Service: Endoscopy;  Laterality: N/A;  OFF PPI/H2 Blocker   Motility Studies   Hold Narcotics x 1 days   Hold TCA x 1 days  2/26 - LVM attempting to confirm appt  2/27 - Confirmed appt    ANORECTAL MANOMETRY N/A 05/10/2021    Procedure: MANOMETRY, ANORECTAL with balloon expulsion test;  Surgeon: Annamaria Mendoza MD;  Location: [facility] (4TH FLR);  Service: Endoscopy;  Laterality: N/A;  order combined  covid test 5/7 Faith, instructions emailed-[facility]    BREAST BIOPSY      Left, benign    BRONCHOSCOPY N/A 10/2/2023    Procedure: bronch;  Surgeon: Roberta Leigh DO;  Location: [facility] OR 2ND FLR;  Service: Endoscopy;  Laterality: N/A;    BRONCHOSCOPY N/A 9/16/2024    Procedure: Flexible bronchoscopy (BAL only);  Surgeon: Roberta Leigh DO;  Location: [facility] OR 2ND FLR;  Service: Endoscopy;  Laterality: N/A;    CERVICAL CONIZATION   W/ LASER  1970    COLONOSCOPY      COLONOSCOPY N/A 03/29/2019    Procedure: COLONOSCOPY;  Surgeon: Annamaria Mendoaz MD;  Location: [facility] (2ND FLR);  Service: Endoscopy;  Laterality: N/A;    COLONOSCOPY N/A 05/10/2021    Procedure: COLONOSCOPY;  Surgeon: Annamaria Mendoza MD;  Location: [facility] (4TH FLR);  Service: Endoscopy;  Laterality: N/A;  2nd floor-previous scopes done on 2nd floor, gastroparesis  full liquid diet x2 days, clear liquid x1 day prior to procedure  covid test 5/7 Faith, instructions emailed-[facility]  5/6 pt confirmed appt-[facility]    DILATION AND CURETTAGE OF UTERUS      ESOPHAGEAL MANOMETRY WITH MEASUREMENT OF IMPEDANCE N/A 03/04/2020    Procedure: MANOMETRY, ESOPHAGUS, WITH IMPEDANCE MEASUREMENT;  Surgeon: Annamaria Mendoza MD;  Location: [facility] ENDO (4TH FLR);  Service: Endoscopy;  Laterality: N/A;  OFF PPI/H2 Blocker   Motility Studies   Hold Narcotics x 1 days   Hold TCA x 1 days    ESOPHAGOGASTRODUODENOSCOPY      ESOPHAGOGASTRODUODENOSCOPY N/A 03/29/2019    Procedure: EGD (ESOPHAGOGASTRODUODENOSCOPY);  Surgeon: Annamaria Mendoza MD;  Location: [address]  FLR);  Service: Endoscopy;  Laterality: N/A;  pulmonary htn    ESOPHAGOGASTRODUODENOSCOPY N/A 02/02/2021    Procedure: ESOPHAGOGASTRODUODENOSCOPY (EGD);  Surgeon: Annamaria Mendoza MD;  Location: [facility] (2ND FLR);  Service: Endoscopy;  Laterality: N/A;  2nd floor gastroparesis  3 days full liquid diet and 1 day clears  covid test 1/30 primary care, instructions sent to [facility]    ESOPHAGOGASTRODUODENOSCOPY N/A 07/01/2022    Procedure: ESOPHAGOGASTRODUODENOSCOPY (EGD);  Surgeon: Annamaria Mendoza MD;  Location: [facility] ENDO (2ND FLR);  Service: Endoscopy;  Laterality: N/A;  2nd floor-gastroparesis  full liquid diet x3 days, clear liquid diet x1 day prior to procedure  fully vaccinated, instructions sent to [facility]  6/29-pt confirmed new arrival time-[facility]    EXCISION, LESION, STOMACH, LAPAROSCOPIC N/A 03/23/2023    Procedure: XI ROBOTIC EXCISION, LESION, STOMACH;  Surgeon: Katherine Segura MD;  Location: [facility] OR [address] [place] [address];  Service: General;  Laterality: N/A;    EXCISIONAL BIOPSY, LYMPH NODE Right 05/26/2023    Procedure: EXCISIONAL BIOPSY, LYMPH NODE, INGUINAL;  Surgeon: Katherine Segura MD;  Location: [facility] OR [address] [place] [address];  Service: General;  Laterality: Right;    HYSTERECTOMY  1990    FRANDY (AUB, Fibroids), ovaries remain    REPAIR, HERNIA, UMBILICAL N/A 03/23/2023    Procedure: REPAIR, HERNIA, UMBILICAL;  Surgeon: Katherine Segura MD;  Location: [facility] OR [address] [place] [address];  Service: General;  Laterality: N/A;    RIGHT HEART CATHETERIZATION Right 01/05/2021    Procedure: INSERTION, CATHETER, RIGHT HEART;  Surgeon: Aureliano Sy MD;  Location: [facility] CATH LAB;  Service: Cardiology;  Laterality: Right;    RIGHT HEART CATHETERIZATION Right 03/16/2023    Procedure: INSERTION, CATHETER, RIGHT HEART;  Surgeon: Aureliano Sy MD;  Location: [facility] CATH LAB;  Service: Cardiology;  Laterality: Right;    RIGHT HEART CATHETERIZATION Right 11/22/2024    Procedure: INSERTION, CATHETER, RIGHT  HEART;  Surgeon: Tamanna Martins MD;  Location: [facility] CATH LAB;  Service: Cardiology;  Laterality: Right;    ROBOT-ASSISTED LAPAROSCOPIC REPAIR OF INGUINAL HERNIA USING DA VERNELL XI Right 05/26/2023    Procedure: XI ROBOTIC REPAIR, HERNIA, INGUINAL, FEMORAL;  Surgeon: Katherine Segura MD;  [facility];  Service: General;  Laterality: Right;    ROBOT-ASSISTED REPAIR OF HIATAL HERNIA USING DA VERNELL XI N/A 03/23/2023    Procedure: XI ROBOTIC REPAIR, HERNIA, HIATAL;  Surgeon: Katherine Segura MD;  [facility];  Service: General;  Laterality: N/A;    VARICOSE VEIN SURGERY      XI ROBOTIC GASTROPEXY N/A 03/23/2023    Procedure: XI ROBOTIC GASTROPEXY;  Surgeon: Katherine Segura MD;  [facility];  Service: General;  Laterality: N/A;    XI ROBOTIC PYLOROMYOTOMY N/A 03/23/2023    Procedure: XI ROBOTIC PYLOROMYOTOMY;  Surgeon: Katherine Segura MD;  [facility];  Service: General;  Laterality: N/A;        Objective:     Vitals  Wt Readings from Last 1 Encounters:   05/28/25 63.5 kg (139 lb 15.9 oz)     Temp Readings from Last 1 Encounters:   05/27/25 97.9 °F (36.6 °C) (Oral)     BP Readings from Last 1 Encounters:   06/13/25 (!) 144/71     Pulse Readings from Last 1 Encounters:   06/13/25 97       Dermatological Exam    Skin:  Skin of both feet thickened (scleroderma), stiff, with extensive ulcerations of both feet down to fat with slowly improving granulation; chronic low level infection, xerosis  Tender hypergranular tissue at dorsal 2nd toe PIPJ less prominent s/p debridement; wounds epithelializing    Nails:  10 nail(s) thickened, and 10 nail(s) discolored    Vascular Exam    Arteries:  Posterior tibial artery palpable on right  Dorsalis pedis artery palpable on right  Posterior tibial artery palpable on left  Dorsalis pedis artery palpable on left    Veins:  Superficial veins unremarkable on right  Superficial veins unremarkable on  left    Swellin+ nonpitting on right  1+ nonpitting on left    Neurological Exam    Camilla touch test:  6/6 sites sensed, light touch intact     Musculoskeletal Exam    Footwear:  Casual on right  Casual on left    Gait Exam:   Ambulatory Status: Ambulatory  Gait: Normal  Assistive Devices: None    Foot Progression Angle:  Normal on right  Normal on left    Right Lower Extremity Additional Findings:  Right foot and ankle function, strength, and range of motion unremarkable except as noted above.     Left Lower Extremity Additional Findings:  2nd hammertoe  Left foot and ankle function, strength, and range of motion unremarkable except as noted above.    Imaging and Other Tests    Imagin/26/25 left foot X rays: 2nd hammertoe deformity, centered at PIPJ    Independently reviewed and interpreted imaging, findings are as follows: N/A     Assessment:     Encounter Diagnoses   Name Primary?    Ulcers of both lower extremities with fat layer exposed Yes    Cellulitis of left lower extremity            Plan:     I counseled the patient on her conditions, their implications and medical management.    Neuropathy: chronic stable  -Failed amitriptyline.   -Continue duloxetine 120 mg/day.      Bilateral ulcers, cellulitis, swelling: chronic stable    -Dressings changed. HH dressing changes.    -Protected WB in surgical shoes.      -Awaiting compounded topical antibiotics from [facility], to be used during dressing changes.     Scleroderma: chronic stable  -Continue PO tadalafil 40 mg/day.      I spent a total of 30 minutes on the day of the visit.  This includes face to face time and non-face to face time preparing to see the patient (eg, review of tests), obtaining and/or reviewing separately obtained history, documenting clinical information in the electronic or other health record, independently interpreting results and communicating results to the patient/family/caregiver, or care coordinator.     Return  to clinic in 1-2 weeks, call sooner PRN.

## 2025-06-13 NOTE — TELEPHONE ENCOUNTER
Copied from CRM #[id number]. Topic: Medications - Medication Refill  >> Jun 13, 2025  2:34 PM Griselda wrote:  Type: RX Refill Request    Who Called: Pt     Have you contacted your pharmacy:Yes     Refill or New Rx:Refill     RX Name and Strength: acetaminophen-codeine 300-60mg (TYLENOL #4) 300-60 mg Tab    How is the patient currently taking it? (ex. 1XDay):1.5 1xday in evening     Is this a 30 day or 90 day RX: 30 day     Preferred Pharmacy with phone number:[facility]   Phone: [phone]  Fax: [phone]          Local or Mail Order: local     Ordering Provider:Dr Sree Hinson     Would the patient rather a call back or a response via [facility]? Call back    Best Call Back Number:[phone]    Additional Information: Please contact pt once rx have been sent to pharmacy. Pt need medication before pharmacy close today.

## 2025-06-16 ENCOUNTER — DOCUMENT SCAN (OUTPATIENT)
Dept: HOME HEALTH SERVICES | Facility: HOSPITAL | Age: 75
End: 2025-06-16
Payer: MEDICARE

## 2025-06-16 RX ORDER — ACETAMINOPHEN AND CODEINE PHOSPHATE 300; 60 MG/1; MG/1
1 TABLET ORAL NIGHTLY
Qty: 45 TABLET | Refills: 0 | Status: SHIPPED | OUTPATIENT
Start: 2025-06-16 | End: 2025-08-05

## 2025-06-18 ENCOUNTER — RESULTS FOLLOW-UP (OUTPATIENT)
Dept: SURGERY | Facility: CLINIC | Age: 75
End: 2025-06-18

## 2025-06-23 ENCOUNTER — TELEPHONE (OUTPATIENT)
Dept: PODIATRY | Facility: CLINIC | Age: 75
End: 2025-06-23
Payer: MEDICARE

## 2025-06-25 ENCOUNTER — PATIENT MESSAGE (OUTPATIENT)
Dept: TRANSPLANT | Facility: CLINIC | Age: 75
End: 2025-06-25
Payer: MEDICARE

## 2025-06-25 ENCOUNTER — OFFICE VISIT (OUTPATIENT)
Dept: SURGERY | Facility: CLINIC | Age: 75
End: 2025-06-25
Payer: MEDICARE

## 2025-06-25 VITALS
WEIGHT: 140 LBS | HEART RATE: 61 BPM | HEIGHT: 65 IN | DIASTOLIC BLOOD PRESSURE: 61 MMHG | BODY MASS INDEX: 23.32 KG/M2 | SYSTOLIC BLOOD PRESSURE: 146 MMHG

## 2025-06-25 DIAGNOSIS — R59.0 LYMPHADENOPATHY, INGUINAL: Primary | ICD-10-CM

## 2025-06-25 PROCEDURE — 99215 OFFICE O/P EST HI 40 MIN: CPT | Mod: S$PBB,NTX,, | Performed by: SURGERY

## 2025-06-25 PROCEDURE — 99215 OFFICE O/P EST HI 40 MIN: CPT | Mod: PBBFAC,NTX | Performed by: SURGERY

## 2025-06-25 PROCEDURE — 99999 PR PBB SHADOW E&M-EST. PATIENT-LVL V: CPT | Mod: PBBFAC,TXP,, | Performed by: SURGERY

## 2025-06-25 NOTE — H&P (VIEW-ONLY)
History & Physical    SUBJECTIVE:     History of Present Illness:  Patient is a 74 y.o. female w hx of scleroderma w lung involvement, robotic repair of right inguinal and femoral hernia repair w Dr Segura on 5/26/23. Comes in today for evaluation of possible recurrence.  Has noticed a groin bulge for months. Improved a little with some antibiotics that her PCP gave her. States that the right groin is tender to the touch. Is bothered when she wears jeans.     Interval Update 6/25/25:   Patient returns to clinic for follow up of bilateral groin lymphadenopathy. She reports no major changes since her last visit. Continues to report bilateral masses, which she feels have been increasing in size over the last 6 months. These are somewhat tender with pressure, but she otherwise does not report significant pain. Denies any fevers, chills, or night sweats. No drainage from the masses.       Review of patient's allergies indicates:   Allergen Reactions    Sulfamethizole     Sulfamethoxazole      Other Reaction(s): Tramadol    rash    Spironolactone Tinitus    Sulfa (sulfonamide antibiotics) Rash     Other reaction(s): Rash    Other reaction(s): Rash   Other reaction(s): Rash    Tramadol Itching and Rash       Current Medications[1]    Past Medical History:   Diagnosis Date    Abnormal Pap smear     Acid reflux     Allergy     Arthritis     Encounter for blood transfusion     GERD (gastroesophageal reflux disease)     Hiatal hernia 03/24/2023    History of migraine headaches     Hx of colonic polyp     Hypertension     Idiopathic neuropathy 07/20/2012    ILD (interstitial lung disease) 11/06/2013    Iron deficiency anemia 03/18/2014    MRSA carrier     Osteopenia     Pneumonia     Pulmonary fibrosis     Pulmonary hypertension     Raynaud's disease     Scleroderma, diffuse     Sjogren's syndrome     Vitamin D deficiency 11/14/2013     Past Surgical History:   Procedure Laterality Date    24 HOUR IMPEDANCE PH MONITORING OF  ESOPHAGUS IN PATIENT NOT TAKING ACID REDUCING MEDICATIONS N/A 03/04/2020    Procedure: IMPEDANCE PH STUDY, ESOPHAGEAL, 24 HOUR, IN PATIENT NOT TAKING ACID REDUCING MEDICATION;  Surgeon: Annamaria Mendoza MD;  Location: [facility] (4TH FLR);  Service: Endoscopy;  Laterality: N/A;  OFF PPI/H2 Blocker   Motility Studies   Hold Narcotics x 1 days   Hold TCA x 1 days  2/26 - LVM attempting to confirm appt  2/27 - Confirmed appt    ANORECTAL MANOMETRY N/A 05/10/2021    Procedure: MANOMETRY, ANORECTAL with balloon expulsion test;  Surgeon: Annamaria Mendoza MD;  Location: [facility] ENDO (4TH FLR);  Service: Endoscopy;  Laterality: N/A;  order combined  covid test 5/7 Buddhist, instructions emailed-[facility]    BREAST BIOPSY      Left, benign    BRONCHOSCOPY N/A 10/2/2023    Procedure: bronch;  Surgeon: Roberta Leigh DO;  Location: [facility] OR 2ND FLR;  Service: Endoscopy;  Laterality: N/A;    BRONCHOSCOPY N/A 9/16/2024    Procedure: Flexible bronchoscopy (BAL only);  Surgeon: Roberta Leigh DO;  Location: [facility] OR 2ND FLR;  Service: Endoscopy;  Laterality: N/A;    CERVICAL CONIZATION   W/ LASER  1970    COLONOSCOPY      COLONOSCOPY N/A 03/29/2019    Procedure: COLONOSCOPY;  Surgeon: Annamaria Mendoza MD;  Location: [facility] (2ND FLR);  Service: Endoscopy;  Laterality: N/A;    COLONOSCOPY N/A 05/10/2021    Procedure: COLONOSCOPY;  Surgeon: Annamaria Mendzoa MD;  Location: [facility] (4TH FLR);  Service: Endoscopy;  Laterality: N/A;  2nd floor-previous scopes done on 2nd floor, gastroparesis  full liquid diet x2 days, clear liquid x1 day prior to procedure  covid test 5/7 Buddhist, instructions emailed-[facility]  5/6 pt confirmed appt-[facility]    DILATION AND CURETTAGE OF UTERUS      ESOPHAGEAL MANOMETRY WITH MEASUREMENT OF IMPEDANCE N/A 03/04/2020    Procedure: MANOMETRY, ESOPHAGUS, WITH IMPEDANCE MEASUREMENT;  Surgeon: Annamaria Mendoza MD;  Location: [facility] ENDO (4TH FLR);  Service: Endoscopy;  Laterality: N/A;  OFF PPI/H2 Blocker    Motility Studies   Hold Narcotics x 1 days   Hold TCA x 1 days    ESOPHAGOGASTRODUODENOSCOPY      ESOPHAGOGASTRODUODENOSCOPY N/A 03/29/2019    Procedure: EGD (ESOPHAGOGASTRODUODENOSCOPY);  Surgeon: Annamaria Mendoza MD;  Location: [facility] ([address] [place] [address]);  Service: Endoscopy;  Laterality: N/A;  pulmonary htn    ESOPHAGOGASTRODUODENOSCOPY N/A 02/02/2021    Procedure: ESOPHAGOGASTRODUODENOSCOPY (EGD);  Surgeon: Annamaria Mendoza MD;  [facility]  Service: Endoscopy;  Laterality: N/A;  2nd floor gastroparesis  3 days full liquid diet and 1 day clears  covid test 1/30 primary care, instructions sent to [facility]    ESOPHAGOGASTRODUODENOSCOPY N/A 07/01/2022    Procedure: ESOPHAGOGASTRODUODENOSCOPY (EGD);  Surgeon: Annamaria Mendoza MD;  Location: [facility] ([address] [place] [address]);  Service: Endoscopy;  Laterality: N/A;  2nd floor-gastroparesis  full liquid diet x3 days, clear liquid diet x1 day prior to procedure  fully vaccinated, instructions sent to [facility]  6/29-pt confirmed new arrival time-[facility]    EXCISION, LESION, STOMACH, LAPAROSCOPIC N/A 03/23/2023    Procedure: XI ROBOTIC EXCISION, LESION, STOMACH;  Surgeon: Katherine Segura MD;  Location: [address];  Service: General;  Laterality: N/A;    EXCISIONAL BIOPSY, LYMPH NODE Right 05/26/2023    Procedure: EXCISIONAL BIOPSY, LYMPH NODE, INGUINAL;  Surgeon: Katherine Segura MD;  Location: [address];  Service: General;  Laterality: Right;    HYSTERECTOMY  1990    FRANDY (AUB, Fibroids), ovaries remain    REPAIR, HERNIA, UMBILICAL N/A 03/23/2023    Procedure: REPAIR, HERNIA, UMBILICAL;  Surgeon: Katherine Segura MD;  Location: [address];  Service: General;  Laterality: N/A;    RIGHT HEART CATHETERIZATION Right 01/05/2021    Procedure: INSERTION, CATHETER, RIGHT HEART;  Surgeon: Aureliano Sy MD;  Location: [facility] CATH LAB;  Service: Cardiology;  Laterality: Right;    RIGHT HEART CATHETERIZATION Right 03/16/2023     Procedure: INSERTION, CATHETER, RIGHT HEART;  Surgeon: Aureliano Sy MD;  Location: [facility] CATH LAB;  Service: Cardiology;  Laterality: Right;    RIGHT HEART CATHETERIZATION Right 11/22/2024    Procedure: INSERTION, CATHETER, RIGHT HEART;  Surgeon: Tamanna Martins MD;  Location: [facility] CATH LAB;  Service: Cardiology;  Laterality: Right;    ROBOT-ASSISTED LAPAROSCOPIC REPAIR OF INGUINAL HERNIA USING DA VERNELL XI Right 05/26/2023    Procedure: XI ROBOTIC REPAIR, HERNIA, INGUINAL, FEMORAL;  Surgeon: Katherine Segura MD;  [facility];  Service: General;  Laterality: Right;    ROBOT-ASSISTED REPAIR OF HIATAL HERNIA USING DA VERNELL XI N/A 03/23/2023    Procedure: XI ROBOTIC REPAIR, HERNIA, HIATAL;  Surgeon: Katherine Segura MD;  [facility];  Service: General;  Laterality: N/A;    VARICOSE VEIN SURGERY      XI ROBOTIC GASTROPEXY N/A 03/23/2023    Procedure: XI ROBOTIC GASTROPEXY;  Surgeon: Katherine Segura MD;  Location: [facility] OR [address] [place] [address];  Service: General;  Laterality: N/A;    XI ROBOTIC PYLOROMYOTOMY N/A 03/23/2023    Procedure: XI ROBOTIC PYLOROMYOTOMY;  Surgeon: Katherine Segura MD;  [facility];  Service: General;  Laterality: N/A;     Family History   Problem Relation Name Age of Onset    Breast cancer Mother Tiki Singh     Cancer Mother Tiki Singh         Breast    Hypertension Father Madi     Diabetes Father Madi         Amputation of legs    Breast cancer Sister Brenda     Diabetes Sister Brenda     Arthritis Sister Brenda     Cancer Sister Brenda         Breast    Osteoarthritis Brother Luis     Diabetes Brother Luis     Arthritis Brother Luis     Birth defects Brother Luis         Polio at birth, recently had knee replacement surgery on left knee    No Known Problems Daughter      No Known Problems Daughter      No Known Problems Son      No Known Problems Son      Breast cancer Maternal Aunt Gege     Cancer  "Maternal Aunt Gege         Breast    Early death Sister Philomena     Melanoma Neg Hx      Colon cancer Neg Hx      Crohn's disease Neg Hx      Stomach cancer Neg Hx      Ulcerative colitis Neg Hx      Rectal cancer Neg Hx      Irritable bowel syndrome Neg Hx      Esophageal cancer Neg Hx      Celiac disease Neg Hx      Ovarian cancer Neg Hx      Liver cancer Neg Hx      Pancreatic cancer Neg Hx       Social History[2]     Review of Systems:  Review of Systems   Constitutional:  Negative for activity change and appetite change.   Respiratory:  Negative for shortness of breath.    Cardiovascular:  Negative for chest pain.   Gastrointestinal:  Negative for abdominal pain.       OBJECTIVE:     Vital Signs (Most Recent)  Pulse: 61 (06/25/25 1514)  BP: (!) 146/61 (06/25/25 1514)  5' 5" (1.651 m)  63.5 kg (140 lb 0.2 oz)     Physical Exam:  Physical Exam  Vitals and nursing note reviewed.   Constitutional:       Appearance: Normal appearance. She is not ill-appearing.   HENT:      Head: Normocephalic.      Mouth/Throat:      Mouth: Mucous membranes are moist.      Pharynx: Oropharynx is clear.   Eyes:      General: No scleral icterus.     Extraocular Movements: Extraocular movements intact.      Conjunctiva/sclera: Conjunctivae normal.   Cardiovascular:      Rate and Rhythm: Normal rate.      Pulses: Normal pulses.   Pulmonary:      Effort: Pulmonary effort is normal. No respiratory distress.      Breath sounds: No wheezing.   Abdominal:      General: Abdomen is flat. There is no distension.      Palpations: Abdomen is soft.   Musculoskeletal:         General: No swelling or tenderness. Normal range of motion.      Cervical back: Normal range of motion.      Comments: Bilateral 3-4cm firm mass, R>L. R side tender   Skin:     General: Skin is warm and dry.      Coloration: Skin is not jaundiced or pale.   Neurological:      General: No focal deficit present.      Mental Status: She is alert and oriented to person, place, " and time.   Psychiatric:         Mood and Affect: Mood normal.       Laboratory  CBC: Reviewed  CMP: Reviewed  Albumin 2.8    Diagnostic Results:  US: Reviewed  2/26/25: no evidence of femoral hernia; inguinal lymphadenopathy present    CT 5/28/25: bilateral inguinal lymphadenopathy    ASSESSMENT/PLAN:     73yo f w scleroderma + previous robo R inguinal hernia/femoral hernia repair in May 23 who presents with groin bulge, imaging w/ bilateral inguinal lymphadenopathy.     PLAN:Plan      Schedule for lymph node biopsy of R inguinal node in the OR   Consent obtained         Case discussed with Dr Segura.     Floresita Serra MD   General Surgery PGY-1  6/25/2025                    [1]   Current Outpatient Medications   Medication Sig Dispense Refill    0.9 % sodium chloride (SODIUM CHLORIDE 0.9%) 0.9 % flush 10 mL 3 TIMES DAILY (route: injection)      acetaminophen-codeine 300-60mg (TYLENOL #4) 300-60 mg Tab 1 tablet BEDTIME (route: oral)      acetaminophen-codeine 300-60mg (TYLENOL #4) 300-60 mg Tab Take 1 tablet by mouth every evening. 45 tablet 0    albuterol (VENTOLIN HFA) 90 mcg/actuation inhaler Inhale 2 puffs into the lungs every 6 (six) hours as needed for Wheezing. Rescue 8.5 g 11    ammonium lactate 12 % Crea Apply 20 g topically once daily. 770 g 2    colistimethate (COLYMYCIN) 150 mg injection       DULoxetine (CYMBALTA) 60 MG capsule Take 1 capsule (60 mg total) by mouth 2 (two) times daily. 60 capsule 11    ergocalciferol (ERGOCALCIFEROL) 50,000 unit Cap Take 1 capsule (50,000 Units total) by mouth every 7 days. 12 capsule 3    furosemide (LASIX) 20 MG tablet Take 1 tablet (20 mg total) by mouth once daily. 30 tablet 5    furosemide (LASIX) 20 MG tablet Take 20 mg by mouth.      heparin sodium,porcine (HEPARIN LOCK FLUSH IV)       meropenem (MERREM) 1 gram injection       multivitamin capsule Take 1 capsule by mouth once daily.      mycophenolate mofetil (CELLCEPT) 200 mg/mL SusR Take 5 mLs (1,000 mg  total) by mouth 2 (two) times daily. 480 mL 1    nabumetone (RELAFEN) 750 MG tablet Take 1 tablet (750 mg total) by mouth daily as needed for Pain. 30 tablet 3    nabumetone (RELAFEN) 750 MG tablet Take 750 mg by mouth.      nebulizer and compressor Rosemary Use as directed 1 each 0    NIFEdipine (PROCARDIA-XL) 30 MG (OSM) 24 hr tablet Take 30 mg by mouth.      NIFEdipine (PROCARDIA-XL) 90 MG (OSM) 24 hr tablet Take 90 mg by mouth.      OFEV 100 mg Cap TAKE 1 CAPSULE BY MOUTH 2 TIMES A DAY 60 capsule 11    pravastatin (PRAVACHOL) 20 MG tablet Take 1 tablet (20 mg total) by mouth every evening. 90 tablet 3    pregabalin (LYRICA) 300 MG Cap 1 capsule 2 TIMES DAILY (route: oral)      pregabalin (LYRICA) 300 MG Cap Take 1 capsule (300 mg total) by mouth 2 (two) times daily. 60 capsule 6    PREVIDENT 5000 BOOSTER PLUS 1.1 % Pste SMARTSIG:To Teeth      RABEprazole (ACIPHEX) 20 mg tablet Take 20 mg by mouth 2 (two) times daily.      RABEprazole (ACIPHEX) 20 mg tablet Take 1 tablet (20 mg total) by mouth 2 (two) times daily. 60 tablet 11     KIT, STERILE MISC USE 10 ML FOR MEDICATION ADMINISTRATION WITH SPRAY BOTTLE- DIRECTIONS FOR USE (SK), #2 30ML SPRAY BOTTLES, #1 FUNNEL, SALINE 5ML VIALS #300ML      tadalafil (ADCIRCA) 20 mg Tab Take 2 tablets (40 mg total) by mouth once daily. 60 tablet 11    tiZANidine (ZANAFLEX) 4 MG tablet Take 2 tablets (8 mg total) by mouth 2 (two) times daily as needed (muscle pain). 60 tablet 3    tiZANidine 4 mg Cap 2 tablet 2 TIMES DAILY (route: oral)      TOBRAMYCIN SULFATE, BULK, MISC MIX 2 GRAMS OF POWDER WITH DILUENT. APPLY TO AFFECTED AREAS. PERFORM ONCE DAILY.      TOBRAMYCIN, BULK, MISC MIX 2 GRAMS OF POWDER WITH DILUENT. APPLY TO AFFECTED AREAS. PERFORM ONCE DAILY.       No current facility-administered medications for this visit.     Facility-Administered Medications Ordered in Other Visits   Medication Dose Route Frequency Provider Last Rate Last Admin    fentaNYL 50  mcg/mL injection 25 mcg  25 mcg Intravenous Q5 Min PRN Khadijah Thapa,         haloperidol lactate injection 0.5 mg  0.5 mg Intravenous Q10 Min PRN Khadijah Thapa,         sodium chloride 0.9% flush 10 mL  10 mL Intravenous PRN Khadijah Thapa,        [2]   Social History  Tobacco Use    Smoking status: Never     Passive exposure: Never    Smokeless tobacco: Never   Vaping Use    Vaping status: Never Used   Substance Use Topics    Alcohol use: Not Currently    Drug use: No

## 2025-06-25 NOTE — PROGRESS NOTES
History & Physical    SUBJECTIVE:     History of Present Illness:  Patient is a 74 y.o. female w hx of scleroderma w lung involvement, robotic repair of right inguinal and femoral hernia repair w Dr Segura on 5/26/23. Comes in today for evaluation of possible recurrence.  Has noticed a groin bulge for months. Improved a little with some antibiotics that her PCP gave her. States that the right groin is tender to the touch. Is bothered when she wears jeans.     Interval Update 6/25/25:   Patient returns to clinic for follow up of bilateral groin lymphadenopathy. She reports no major changes since her last visit. Continues to report bilateral masses, which she feels have been increasing in size over the last 6 months. These are somewhat tender with pressure, but she otherwise does not report significant pain. Denies any fevers, chills, or night sweats. No drainage from the masses.       Review of patient's allergies indicates:   Allergen Reactions    Sulfamethizole     Sulfamethoxazole      Other Reaction(s): Tramadol    rash    Spironolactone Tinitus    Sulfa (sulfonamide antibiotics) Rash     Other reaction(s): Rash    Other reaction(s): Rash   Other reaction(s): Rash    Tramadol Itching and Rash       Current Medications[1]    Past Medical History:   Diagnosis Date    Abnormal Pap smear     Acid reflux     Allergy     Arthritis     Encounter for blood transfusion     GERD (gastroesophageal reflux disease)     Hiatal hernia 03/24/2023    History of migraine headaches     Hx of colonic polyp     Hypertension     Idiopathic neuropathy 07/20/2012    ILD (interstitial lung disease) 11/06/2013    Iron deficiency anemia 03/18/2014    MRSA carrier     Osteopenia     Pneumonia     Pulmonary fibrosis     Pulmonary hypertension     Raynaud's disease     Scleroderma, diffuse     Sjogren's syndrome     Vitamin D deficiency 11/14/2013     Past Surgical History:   Procedure Laterality Date    24 HOUR IMPEDANCE PH MONITORING OF  ESOPHAGUS IN PATIENT NOT TAKING ACID REDUCING MEDICATIONS N/A 03/04/2020    Procedure: IMPEDANCE PH STUDY, ESOPHAGEAL, 24 HOUR, IN PATIENT NOT TAKING ACID REDUCING MEDICATION;  Surgeon: Annamaria Mendoza MD;  Location: [facility] (4TH FLR);  Service: Endoscopy;  Laterality: N/A;  OFF PPI/H2 Blocker   Motility Studies   Hold Narcotics x 1 days   Hold TCA x 1 days  2/26 - LVM attempting to confirm appt  2/27 - Confirmed appt    ANORECTAL MANOMETRY N/A 05/10/2021    Procedure: MANOMETRY, ANORECTAL with balloon expulsion test;  Surgeon: Annamaria Mendoza MD;  Location: [facility] ENDO (4TH FLR);  Service: Endoscopy;  Laterality: N/A;  order combined  covid test 5/7 Hindu, instructions emailed-[facility]    BREAST BIOPSY      Left, benign    BRONCHOSCOPY N/A 10/2/2023    Procedure: bronch;  Surgeon: Roberta Leigh DO;  Location: [facility] OR 2ND FLR;  Service: Endoscopy;  Laterality: N/A;    BRONCHOSCOPY N/A 9/16/2024    Procedure: Flexible bronchoscopy (BAL only);  Surgeon: Roberta Leigh DO;  Location: [facility] OR 2ND FLR;  Service: Endoscopy;  Laterality: N/A;    CERVICAL CONIZATION   W/ LASER  1970    COLONOSCOPY      COLONOSCOPY N/A 03/29/2019    Procedure: COLONOSCOPY;  Surgeon: Annamaria Mendoza MD;  Location: [facility] (2ND FLR);  Service: Endoscopy;  Laterality: N/A;    COLONOSCOPY N/A 05/10/2021    Procedure: COLONOSCOPY;  Surgeon: Annamaria Mendoza MD;  Location: [facility] (4TH FLR);  Service: Endoscopy;  Laterality: N/A;  2nd floor-previous scopes done on 2nd floor, gastroparesis  full liquid diet x2 days, clear liquid x1 day prior to procedure  covid test 5/7 [facility], instructions emailed-[facility]  5/6 pt confirmed appt-[facility]    DILATION AND CURETTAGE OF UTERUS      ESOPHAGEAL MANOMETRY WITH MEASUREMENT OF IMPEDANCE N/A 03/04/2020    Procedure: MANOMETRY, ESOPHAGUS, WITH IMPEDANCE MEASUREMENT;  Surgeon: Annamaria Mendoza MD;  Location: [facility] ENDO (4TH FLR);  Service: Endoscopy;  Laterality: N/A;  OFF PPI/H2 Blocker    Motility Studies   Hold Narcotics x 1 days   Hold TCA x 1 days    ESOPHAGOGASTRODUODENOSCOPY      ESOPHAGOGASTRODUODENOSCOPY N/A 03/29/2019    Procedure: EGD (ESOPHAGOGASTRODUODENOSCOPY);  Surgeon: Annamaria Mendoza MD;  Location: [facility] ([address] [place] [address]);  Service: Endoscopy;  Laterality: N/A;  pulmonary htn    ESOPHAGOGASTRODUODENOSCOPY N/A 02/02/2021    Procedure: ESOPHAGOGASTRODUODENOSCOPY (EGD);  Surgeon: Annamaria Mendoza MD;  [facility]  Service: Endoscopy;  Laterality: N/A;  2nd floor gastroparesis  3 days full liquid diet and 1 day clears  covid test 1/30 primary care, instructions sent to [facility]    ESOPHAGOGASTRODUODENOSCOPY N/A 07/01/2022    Procedure: ESOPHAGOGASTRODUODENOSCOPY (EGD);  Surgeon: Annamaria Mendoza MD;  Location: [facility] ([address] [place] [address]);  Service: Endoscopy;  Laterality: N/A;  2nd floor-gastroparesis  full liquid diet x3 days, clear liquid diet x1 day prior to procedure  fully vaccinated, instructions sent to [facility]  6/29-pt confirmed new arrival time-[facility]    EXCISION, LESION, STOMACH, LAPAROSCOPIC N/A 03/23/2023    Procedure: XI ROBOTIC EXCISION, LESION, STOMACH;  Surgeon: Katherine Segura MD;  Location: [address];  Service: General;  Laterality: N/A;    EXCISIONAL BIOPSY, LYMPH NODE Right 05/26/2023    Procedure: EXCISIONAL BIOPSY, LYMPH NODE, INGUINAL;  Surgeon: Katherine Segura MD;  Location: [address];  Service: General;  Laterality: Right;    HYSTERECTOMY  1990    FRANDY (AUB, Fibroids), ovaries remain    REPAIR, HERNIA, UMBILICAL N/A 03/23/2023    Procedure: REPAIR, HERNIA, UMBILICAL;  Surgeon: Katherine Segura MD;  Location: [address];  Service: General;  Laterality: N/A;    RIGHT HEART CATHETERIZATION Right 01/05/2021    Procedure: INSERTION, CATHETER, RIGHT HEART;  Surgeon: Aureliano Sy MD;  Location: [facility] CATH LAB;  Service: Cardiology;  Laterality: Right;    RIGHT HEART CATHETERIZATION Right 03/16/2023     Procedure: INSERTION, CATHETER, RIGHT HEART;  Surgeon: Aureliano Sy MD;  Location: [facility] CATH LAB;  Service: Cardiology;  Laterality: Right;    RIGHT HEART CATHETERIZATION Right 11/22/2024    Procedure: INSERTION, CATHETER, RIGHT HEART;  Surgeon: Tamanna Martins MD;  Location: [facility] CATH LAB;  Service: Cardiology;  Laterality: Right;    ROBOT-ASSISTED LAPAROSCOPIC REPAIR OF INGUINAL HERNIA USING DA VERNELL XI Right 05/26/2023    Procedure: XI ROBOTIC REPAIR, HERNIA, INGUINAL, FEMORAL;  Surgeon: Katherine Segura MD;  [facility];  Service: General;  Laterality: Right;    ROBOT-ASSISTED REPAIR OF HIATAL HERNIA USING DA VERNELL XI N/A 03/23/2023    Procedure: XI ROBOTIC REPAIR, HERNIA, HIATAL;  Surgeon: Katherine Segura MD;  [facility];  Service: General;  Laterality: N/A;    VARICOSE VEIN SURGERY      XI ROBOTIC GASTROPEXY N/A 03/23/2023    Procedure: XI ROBOTIC GASTROPEXY;  Surgeon: Katherine Segura MD;  Location: [facility] OR [address] [place] [address];  Service: General;  Laterality: N/A;    XI ROBOTIC PYLOROMYOTOMY N/A 03/23/2023    Procedure: XI ROBOTIC PYLOROMYOTOMY;  Surgeon: Katherine Segura MD;  [facility];  Service: General;  Laterality: N/A;     Family History   Problem Relation Name Age of Onset    Breast cancer Mother Tiki Singh     Cancer Mother Tiki Singh         Breast    Hypertension Father Madi     Diabetes Father Madi         Amputation of legs    Breast cancer Sister Brenda     Diabetes Sister Brenda     Arthritis Sister Brenda     Cancer Sister Brenda         Breast    Osteoarthritis Brother Luis     Diabetes Brother Luis     Arthritis Brother Luis     Birth defects Brother Luis         Polio at birth, recently had knee replacement surgery on left knee    No Known Problems Daughter      No Known Problems Daughter      No Known Problems Son      No Known Problems Son      Breast cancer Maternal Aunt Gege     Cancer  "Maternal Aunt Gege         Breast    Early death Sister Philomena     Melanoma Neg Hx      Colon cancer Neg Hx      Crohn's disease Neg Hx      Stomach cancer Neg Hx      Ulcerative colitis Neg Hx      Rectal cancer Neg Hx      Irritable bowel syndrome Neg Hx      Esophageal cancer Neg Hx      Celiac disease Neg Hx      Ovarian cancer Neg Hx      Liver cancer Neg Hx      Pancreatic cancer Neg Hx       Social History[2]     Review of Systems:  Review of Systems   Constitutional:  Negative for activity change and appetite change.   Respiratory:  Negative for shortness of breath.    Cardiovascular:  Negative for chest pain.   Gastrointestinal:  Negative for abdominal pain.       OBJECTIVE:     Vital Signs (Most Recent)  Pulse: 61 (06/25/25 1514)  BP: (!) 146/61 (06/25/25 1514)  5' 5" (1.651 m)  63.5 kg (140 lb 0.2 oz)     Physical Exam:  Physical Exam  Vitals and nursing note reviewed.   Constitutional:       Appearance: Normal appearance. She is not ill-appearing.   HENT:      Head: Normocephalic.      Mouth/Throat:      Mouth: Mucous membranes are moist.      Pharynx: Oropharynx is clear.   Eyes:      General: No scleral icterus.     Extraocular Movements: Extraocular movements intact.      Conjunctiva/sclera: Conjunctivae normal.   Cardiovascular:      Rate and Rhythm: Normal rate.      Pulses: Normal pulses.   Pulmonary:      Effort: Pulmonary effort is normal. No respiratory distress.      Breath sounds: No wheezing.   Abdominal:      General: Abdomen is flat. There is no distension.      Palpations: Abdomen is soft.   Musculoskeletal:         General: No swelling or tenderness. Normal range of motion.      Cervical back: Normal range of motion.      Comments: Bilateral 3-4cm firm mass, R>L. R side tender   Skin:     General: Skin is warm and dry.      Coloration: Skin is not jaundiced or pale.   Neurological:      General: No focal deficit present.      Mental Status: She is alert and oriented to person, place, " and time.   Psychiatric:         Mood and Affect: Mood normal.       Laboratory  CBC: Reviewed  CMP: Reviewed  Albumin 2.8    Diagnostic Results:  US: Reviewed  2/26/25: no evidence of femoral hernia; inguinal lymphadenopathy present    CT 5/28/25: bilateral inguinal lymphadenopathy    ASSESSMENT/PLAN:     75yo f w scleroderma + previous robo R inguinal hernia/femoral hernia repair in May 23 who presents with groin bulge, imaging w/ bilateral inguinal lymphadenopathy.     PLAN:Plan      Schedule for lymph node biopsy of R inguinal node in the OR   Consent obtained         Case discussed with Dr Segura.     Floresita Serra MD   General Surgery PGY-1  6/25/2025                    [1]   Current Outpatient Medications   Medication Sig Dispense Refill    0.9 % sodium chloride (SODIUM CHLORIDE 0.9%) 0.9 % flush 10 mL 3 TIMES DAILY (route: injection)      acetaminophen-codeine 300-60mg (TYLENOL #4) 300-60 mg Tab 1 tablet BEDTIME (route: oral)      acetaminophen-codeine 300-60mg (TYLENOL #4) 300-60 mg Tab Take 1 tablet by mouth every evening. 45 tablet 0    albuterol (VENTOLIN HFA) 90 mcg/actuation inhaler Inhale 2 puffs into the lungs every 6 (six) hours as needed for Wheezing. Rescue 8.5 g 11    ammonium lactate 12 % Crea Apply 20 g topically once daily. 770 g 2    colistimethate (COLYMYCIN) 150 mg injection       DULoxetine (CYMBALTA) 60 MG capsule Take 1 capsule (60 mg total) by mouth 2 (two) times daily. 60 capsule 11    ergocalciferol (ERGOCALCIFEROL) 50,000 unit Cap Take 1 capsule (50,000 Units total) by mouth every 7 days. 12 capsule 3    furosemide (LASIX) 20 MG tablet Take 1 tablet (20 mg total) by mouth once daily. 30 tablet 5    furosemide (LASIX) 20 MG tablet Take 20 mg by mouth.      heparin sodium,porcine (HEPARIN LOCK FLUSH IV)       meropenem (MERREM) 1 gram injection       multivitamin capsule Take 1 capsule by mouth once daily.      mycophenolate mofetil (CELLCEPT) 200 mg/mL SusR Take 5 mLs (1,000 mg  total) by mouth 2 (two) times daily. 480 mL 1    nabumetone (RELAFEN) 750 MG tablet Take 1 tablet (750 mg total) by mouth daily as needed for Pain. 30 tablet 3    nabumetone (RELAFEN) 750 MG tablet Take 750 mg by mouth.      nebulizer and compressor Rosemary Use as directed 1 each 0    NIFEdipine (PROCARDIA-XL) 30 MG (OSM) 24 hr tablet Take 30 mg by mouth.      NIFEdipine (PROCARDIA-XL) 90 MG (OSM) 24 hr tablet Take 90 mg by mouth.      OFEV 100 mg Cap TAKE 1 CAPSULE BY MOUTH 2 TIMES A DAY 60 capsule 11    pravastatin (PRAVACHOL) 20 MG tablet Take 1 tablet (20 mg total) by mouth every evening. 90 tablet 3    pregabalin (LYRICA) 300 MG Cap 1 capsule 2 TIMES DAILY (route: oral)      pregabalin (LYRICA) 300 MG Cap Take 1 capsule (300 mg total) by mouth 2 (two) times daily. 60 capsule 6    PREVIDENT 5000 BOOSTER PLUS 1.1 % Pste SMARTSIG:To Teeth      RABEprazole (ACIPHEX) 20 mg tablet Take 20 mg by mouth 2 (two) times daily.      RABEprazole (ACIPHEX) 20 mg tablet Take 1 tablet (20 mg total) by mouth 2 (two) times daily. 60 tablet 11     KIT, STERILE MISC USE 10 ML FOR MEDICATION ADMINISTRATION WITH SPRAY BOTTLE- DIRECTIONS FOR USE (SK), #2 30ML SPRAY BOTTLES, #1 FUNNEL, SALINE 5ML VIALS #300ML      tadalafil (ADCIRCA) 20 mg Tab Take 2 tablets (40 mg total) by mouth once daily. 60 tablet 11    tiZANidine (ZANAFLEX) 4 MG tablet Take 2 tablets (8 mg total) by mouth 2 (two) times daily as needed (muscle pain). 60 tablet 3    tiZANidine 4 mg Cap 2 tablet 2 TIMES DAILY (route: oral)      TOBRAMYCIN SULFATE, BULK, MISC MIX 2 GRAMS OF POWDER WITH DILUENT. APPLY TO AFFECTED AREAS. PERFORM ONCE DAILY.      TOBRAMYCIN, BULK, MISC MIX 2 GRAMS OF POWDER WITH DILUENT. APPLY TO AFFECTED AREAS. PERFORM ONCE DAILY.       No current facility-administered medications for this visit.     Facility-Administered Medications Ordered in Other Visits   Medication Dose Route Frequency Provider Last Rate Last Admin    fentaNYL 50  mcg/mL injection 25 mcg  25 mcg Intravenous Q5 Min PRN Khadijah Thapa,         haloperidol lactate injection 0.5 mg  0.5 mg Intravenous Q10 Min PRN Khadijah Thapa,         sodium chloride 0.9% flush 10 mL  10 mL Intravenous PRN Khadijah Thapa,        [2]   Social History  Tobacco Use    Smoking status: Never     Passive exposure: Never    Smokeless tobacco: Never   Vaping Use    Vaping status: Never Used   Substance Use Topics    Alcohol use: Not Currently    Drug use: No

## 2025-06-27 ENCOUNTER — OFFICE VISIT (OUTPATIENT)
Dept: PODIATRY | Facility: CLINIC | Age: 75
End: 2025-06-27
Payer: MEDICARE

## 2025-06-27 VITALS
SYSTOLIC BLOOD PRESSURE: 126 MMHG | BODY MASS INDEX: 23.3 KG/M2 | DIASTOLIC BLOOD PRESSURE: 59 MMHG | HEIGHT: 65 IN | HEART RATE: 90 BPM

## 2025-06-27 DIAGNOSIS — L97.912 ULCERS OF BOTH LOWER EXTREMITIES WITH FAT LAYER EXPOSED: Primary | ICD-10-CM

## 2025-06-27 DIAGNOSIS — M34.9 SCLERODERMA: ICD-10-CM

## 2025-06-27 DIAGNOSIS — E78.5 HYPERLIPIDEMIA: ICD-10-CM

## 2025-06-27 DIAGNOSIS — L97.922 ULCERS OF BOTH LOWER EXTREMITIES WITH FAT LAYER EXPOSED: Primary | ICD-10-CM

## 2025-06-27 PROCEDURE — 99215 OFFICE O/P EST HI 40 MIN: CPT | Mod: PBBFAC | Performed by: STUDENT IN AN ORGANIZED HEALTH CARE EDUCATION/TRAINING PROGRAM

## 2025-06-27 PROCEDURE — 99999 PR PBB SHADOW E&M-EST. PATIENT-LVL V: CPT | Mod: PBBFAC,,, | Performed by: STUDENT IN AN ORGANIZED HEALTH CARE EDUCATION/TRAINING PROGRAM

## 2025-06-27 RX ORDER — PRAVASTATIN SODIUM 20 MG/1
20 TABLET ORAL NIGHTLY
Qty: 90 TABLET | Refills: 3 | Status: CANCELLED | OUTPATIENT
Start: 2025-06-27

## 2025-06-27 NOTE — TELEPHONE ENCOUNTER
No care due was identified.  [facility] Embedded Care Due Messages. Reference number: [id number].   6/27/2025 1:21:51 PM CDT

## 2025-06-27 NOTE — PROGRESS NOTES
Subjective:     Patient    Yamel Shah is a 74 y.o. female.    Problem    2024: Previously seen by multiple wound care providers without significant improvement in wounds, has bounced around a lot. Has bilateral foot wounds related to scleroderma and immunosuppressant medications. Had improvement in granulation with regular wound care, oral and topical antibiotics, one debridement. Attempted topical vitamin E but caused burning.     01/03/25: Returns for bilateral foot wound care. No new concerns. Had pain flare on left foot, still sensitive.     01/08/25: Returns for bilateral foot wound care. No new concerns.     01/16/25: Returns for bilateral foot wound care. Awaiting appointment with ID 02/14. No new concerns.     01/24/25: Returns for bilateral foot wound care. Pain controlled on current medications. No new concerns.     01/31/25: Returns for bilateral foot wound care. Not tolerating amitriptyline well; still with nerve pain, tingling, cramping, burning despite duloxetine, tolerates duloxetine well.     02/07/25: Returns for bilateral foot wound care. Increased duloxetine at last visit for BLE nerve pain with significant improvement in pain without side effects. ID appointment rescheduled to 02/24.     02/14/25: Returns for bilateral foot wound care. Wants to restart PO antibiotics until she sees ID, thinks she was progressing better at that time.     02/21/25: Returns for bilateral foot wound care. Wounds seem somewhat better on PO antibiotics, ID appointment next week.     02/25/25: Returns for bilateral foot wound care. Completed recent PO antibiotics, recent cultures + for Pseudomonas resistant to PO antibiotics; has first ID appointment tomorrow.     03/07/25: Returns for bilateral foot wound care. Getting OPAT per ID soon. Pain OK.     03/12/25: Returns for bilateral foot wound care. Will start antibiotics within next week. Pain OK except severe at left 2nd toe dorsally. Wants  debridement of left 2nd toe today.     03/19/25: Returns for bilateral foot wound care. On IV antibiotics. No new concerns.     03/27/25: Returns for bilateral foot wound care. Still on IV antibiotics. No new concerns.     04/04/25: Returns for bilateral foot wound care. Stopped IV antibiotics but still has the line in. Going on vacation for 2 weeks soon.     04/25/25: Returns for bilateral foot wound care. Newer cultures with polymicrobial growth, ID not concerned. Does not feel wounds significantly improved with IV antibiotics.     05/01/25: Returns for bilateral foot wound care. Awaiting topical antibiotics from [facility].     05/30/25: Returns for bilateral foot wound care. Has received antibiotics from [facility]. Seen by Dr Ledbetter 1 week ago for dressing change. Wounds improving drastically.     06/12/25: Returns for bilateral foot wound care. Wounds continue to improve, no new concerns.     06/27/25: Returns for bilateral foot wound care. No new concerns.     History    History obtained from patient and review of medical records.     Past Medical History:   Diagnosis Date    Abnormal Pap smear     Acid reflux     Allergy     Arthritis     Encounter for blood transfusion     GERD (gastroesophageal reflux disease)     Hiatal hernia 03/24/2023    History of migraine headaches     Hx of colonic polyp     Hypertension     Idiopathic neuropathy 07/20/2012    ILD (interstitial lung disease) 11/06/2013    Iron deficiency anemia 03/18/2014    MRSA carrier     Osteopenia     Pneumonia     Pulmonary fibrosis     Pulmonary hypertension     Raynaud's disease     Scleroderma, diffuse     Sjogren's syndrome     Vitamin D deficiency 11/14/2013       Past Surgical History:   Procedure Laterality Date    24 HOUR IMPEDANCE PH MONITORING OF ESOPHAGUS IN PATIENT NOT TAKING ACID REDUCING MEDICATIONS N/A 03/04/2020    Procedure: IMPEDANCE PH STUDY, ESOPHAGEAL, 24 HOUR, IN PATIENT NOT TAKING ACID REDUCING MEDICATION;   Surgeon: Annamaria Mendoza MD;  Location: [facility] ENDO (4TH FLR);  Service: Endoscopy;  Laterality: N/A;  OFF PPI/H2 Blocker   Motility Studies   Hold Narcotics x 1 days   Hold TCA x 1 days  2/26 - LVM attempting to confirm appt  2/27 - Confirmed appt    ANORECTAL MANOMETRY N/A 05/10/2021    Procedure: MANOMETRY, ANORECTAL with balloon expulsion test;  Surgeon: Annamaria Mendoza MD;  Location: [facility] ENDO (4TH FLR);  Service: Endoscopy;  Laterality: N/A;  order combined  covid test 5/7 [facility], instructions emailed-KPvt    BREAST BIOPSY      Left, benign    BRONCHOSCOPY N/A 10/2/2023    Procedure: bronch;  Surgeon: Roberta Leigh DO;  Location: [facility] OR 2ND FLR;  Service: Endoscopy;  Laterality: N/A;    BRONCHOSCOPY N/A 9/16/2024    Procedure: Flexible bronchoscopy (BAL only);  Surgeon: Roberta Leigh DO;  Location: [facility] OR 2ND FLR;  Service: Endoscopy;  Laterality: N/A;    CERVICAL CONIZATION   W/ LASER  1970    COLONOSCOPY      COLONOSCOPY N/A 03/29/2019    Procedure: COLONOSCOPY;  Surgeon: Annamaria Mendoza MD;  Location: [facility] (2ND FLR);  Service: Endoscopy;  Laterality: N/A;    COLONOSCOPY N/A 05/10/2021    Procedure: COLONOSCOPY;  Surgeon: Annamaria Mendoza MD;  Location: [facility] (4TH FLR);  Service: Endoscopy;  Laterality: N/A;  2nd floor-previous scopes done on 2nd floor, gastroparesis  full liquid diet x2 days, clear liquid x1 day prior to procedure  covid test 5/7 [facility], instructions emailed-KPvt  5/6 pt confirmed appt-KPvt    DILATION AND CURETTAGE OF UTERUS      ESOPHAGEAL MANOMETRY WITH MEASUREMENT OF IMPEDANCE N/A 03/04/2020    Procedure: MANOMETRY, ESOPHAGUS, WITH IMPEDANCE MEASUREMENT;  Surgeon: Annamaria Mendoza MD;  Location: [facility] ENDO (4TH FLR);  Service: Endoscopy;  Laterality: N/A;  OFF PPI/H2 Blocker   Motility Studies   Hold Narcotics x 1 days   Hold TCA x 1 days    ESOPHAGOGASTRODUODENOSCOPY      ESOPHAGOGASTRODUODENOSCOPY N/A 03/29/2019    Procedure: EGD  (ESOPHAGOGASTRODUODENOSCOPY);  Surgeon: Annamaria Mendoza MD;  Location: [facility] ENDO (2ND FLR);  Service: Endoscopy;  Laterality: N/A;  pulmonary htn    ESOPHAGOGASTRODUODENOSCOPY N/A 02/02/2021    Procedure: ESOPHAGOGASTRODUODENOSCOPY (EGD);  Surgeon: Annamaria Mendoza MD;  Location: [facility] ENDO (2ND FLR);  Service: Endoscopy;  Laterality: N/A;  2nd floor gastroparesis  3 days full liquid diet and 1 day clears  covid test 1/30 primary care, instructions sent to [facility]    ESOPHAGOGASTRODUODENOSCOPY N/A 07/01/2022    Procedure: ESOPHAGOGASTRODUODENOSCOPY (EGD);  Surgeon: Annamaria Mendoza MD;  Location: [facility] ENDO (2ND FLR);  Service: Endoscopy;  Laterality: N/A;  2nd floor-gastroparesis  full liquid diet x3 days, clear liquid diet x1 day prior to procedure  fully vaccinated, instructions sent to [facility]  6/29-pt confirmed new arrival time-[facility]    EXCISION, LESION, STOMACH, LAPAROSCOPIC N/A 03/23/2023    Procedure: XI ROBOTIC EXCISION, LESION, STOMACH;  Surgeon: Katherine Segura MD;  [facility];  Service: General;  Laterality: N/A;    EXCISIONAL BIOPSY, LYMPH NODE Right 05/26/2023    Procedure: EXCISIONAL BIOPSY, LYMPH NODE, INGUINAL;  Surgeon: Katherine Segura MD;  Location: [facility] OR [address] [place] [address];  Service: General;  Laterality: Right;    HYSTERECTOMY  1990    FRANDY (AUB, Fibroids), ovaries remain    REPAIR, HERNIA, UMBILICAL N/A 03/23/2023    Procedure: REPAIR, HERNIA, UMBILICAL;  Surgeon: Katherine Segura MD;  Location: [facility] OR [address] [place] [address];  Service: General;  Laterality: N/A;    RIGHT HEART CATHETERIZATION Right 01/05/2021    Procedure: INSERTION, CATHETER, RIGHT HEART;  Surgeon: Aureliano Sy MD;  Location: [facility] CATH LAB;  Service: Cardiology;  Laterality: Right;    RIGHT HEART CATHETERIZATION Right 03/16/2023    Procedure: INSERTION, CATHETER, RIGHT HEART;  Surgeon: Aureliano Sy MD;  Location: [facility] CATH LAB;  Service: Cardiology;  Laterality: Right;     RIGHT HEART CATHETERIZATION Right 11/22/2024    Procedure: INSERTION, CATHETER, RIGHT HEART;  Surgeon: Tamanna Martins MD;  Location: [facility] CATH LAB;  Service: Cardiology;  Laterality: Right;    ROBOT-ASSISTED LAPAROSCOPIC REPAIR OF INGUINAL HERNIA USING DA VERNELL XI Right 05/26/2023    Procedure: XI ROBOTIC REPAIR, HERNIA, INGUINAL, FEMORAL;  Surgeon: Katherine Segura MD;  Location: [facility] OR [facility] FLR;  Service: General;  Laterality: Right;    ROBOT-ASSISTED REPAIR OF HIATAL HERNIA USING DA VERNELL XI N/A 03/23/2023    Procedure: XI ROBOTIC REPAIR, HERNIA, HIATAL;  Surgeon: Katherine Segura MD;  [facility];  Service: General;  Laterality: N/A;    VARICOSE VEIN SURGERY      XI ROBOTIC GASTROPEXY N/A 03/23/2023    Procedure: XI ROBOTIC GASTROPEXY;  Surgeon: Katherine Segura MD;  Location: [facility] OR [facility] FLR;  Service: General;  Laterality: N/A;    XI ROBOTIC PYLOROMYOTOMY N/A 03/23/2023    Procedure: XI ROBOTIC PYLOROMYOTOMY;  Surgeon: Katherine Segura MD;  Location: [facility] OR [facility] FLR;  Service: General;  Laterality: N/A;        Objective:     Vitals  Wt Readings from Last 1 Encounters:   06/25/25 63.5 kg (140 lb 0.2 oz)     Temp Readings from Last 1 Encounters:   05/27/25 97.9 °F (36.6 °C) (Oral)     BP Readings from Last 1 Encounters:   06/27/25 (!) 126/59     Pulse Readings from Last 1 Encounters:   06/27/25 90       Dermatological Exam    Skin:  Skin of both feet thickened (scleroderma), stiff, with extensive ulcerations of both feet down to fat with slowly improving granulation; chronic low level infection, xerosis  Tender hypergranular tissue at dorsal 2nd toe PIPJ less prominent s/p debridement; wounds epithelializing            Nails:  10 nail(s) thickened, and 10 nail(s) discolored    Vascular Exam    Arteries:  Posterior tibial artery palpable on right  Dorsalis pedis artery palpable on right  Posterior tibial artery palpable on left  Dorsalis pedis artery palpable on  left    Veins:  Superficial veins unremarkable on right  Superficial veins unremarkable on left    Swellin+ nonpitting on right  1+ nonpitting on left    Neurological Exam    [place] touch test:  6/6 sites sensed, light touch intact     Musculoskeletal Exam    Footwear:  Casual on right  Casual on left    Gait Exam:   Ambulatory Status: Ambulatory  Gait: Normal  Assistive Devices: None    Foot Progression Angle:  Normal on right  Normal on left    Right Lower Extremity Additional Findings:  Right foot and ankle function, strength, and range of motion unremarkable except as noted above.     Left Lower Extremity Additional Findings:  2nd hammertoe  Left foot and ankle function, strength, and range of motion unremarkable except as noted above.    Imaging and Other Tests    Imagin/26/25 left foot X rays: 2nd hammertoe deformity, centered at PIPJ    Independently reviewed and interpreted imaging, findings are as follows: N/A     Assessment:     Encounter Diagnosis   Name Primary?    Ulcers of both lower extremities with fat layer exposed Yes             Plan:     I counseled the patient on her conditions, their implications and medical management.    Neuropathy: chronic stable  -Failed amitriptyline.   -Continue duloxetine 120 mg/day.      Bilateral ulcers, cellulitis, swelling: chronic stable    -Dressings changed. HH dressing changes.    -Protected WB in surgical shoes.      -Awaiting compounded topical antibiotics from [facility], to be used during dressing changes.     Scleroderma: chronic stable  -Continue PO tadalafil 40 mg/day.      I spent a total of 40 minutes on the day of the visit.  This includes face to face time and non-face to face time preparing to see the patient (eg, review of tests), obtaining and/or reviewing separately obtained history, documenting clinical information in the electronic or other health record, independently interpreting results and communicating results to the  patient/family/caregiver, or care coordinator.     Return to clinic in 3-4 weeks, call sooner PRN.

## 2025-06-30 NOTE — TELEPHONE ENCOUNTER
----- Message from Roberta Leigh DO sent at 12/2/2022  1:16 PM CST -----  No evidence of acute pneumonia.  Chronic fibrosis changes unchanged.  Will follow-up respiratory infection panel.     Normal rate and regular rhythm.      Heart sounds: Normal heart sounds. No murmur heard.  Pulmonary:      Effort: Pulmonary effort is normal. No respiratory distress.      Breath sounds: Normal breath sounds. No decreased breath sounds, wheezing, rhonchi or rales.   Chest:      Chest wall: No tenderness.   Abdominal:      General: There is no distension.      Palpations: Abdomen is soft. There is no mass.      Tenderness: There is abdominal tenderness. There is no right CVA tenderness or left CVA tenderness.   Musculoskeletal:         General: No tenderness or deformity. Normal range of motion.      Cervical back: Normal range of motion and neck supple.   Lymphadenopathy:      Cervical: No cervical adenopathy.   Skin:     General: Skin is warm and dry.      Findings: Rash present.   Neurological:      Mental Status: He is alert and oriented to person, place, and time.      Motor: No abnormal muscle tone.      Coordination: Coordination normal.      Gait: Gait normal.   Psychiatric:         Mood and Affect: Mood normal.         Behavior: Behavior normal.         Thought Content: Thought content normal.         Judgment: Judgment normal.              On this date 6/30/2025 I have spent 31 minutes reviewing previous notes, test results and face to face with the patient discussing the diagnosis and importance of compliance with the treatment plan as well as documenting on the day of the visit.    The patient (or guardian, if applicable) and other individuals in attendance with the patient were advised that Artificial Intelligence will be utilized during this visit to record, process the conversation to generate a clinical note and to support improvement of the AI technology. The patient (or guardian, if applicable) and other individuals in attendance at the appointment consented to the use of AI, including the recording.      An electronic signature was used to authenticate this note.    --Leo Webb, YUSEF - CNP

## 2025-07-02 ENCOUNTER — PATIENT MESSAGE (OUTPATIENT)
Dept: RHEUMATOLOGY | Facility: CLINIC | Age: 75
End: 2025-07-02
Payer: MEDICARE

## 2025-07-06 RX ORDER — SODIUM CHLORIDE 9 MG/ML
INJECTION, SOLUTION INTRAVENOUS CONTINUOUS
OUTPATIENT
Start: 2025-07-06

## 2025-07-08 ENCOUNTER — HOSPITAL ENCOUNTER (OUTPATIENT)
Dept: PULMONOLOGY | Facility: CLINIC | Age: 75
Discharge: HOME | End: 2025-07-08
Attending: INTERNAL MEDICINE
Payer: MEDICARE

## 2025-07-08 ENCOUNTER — HOSPITAL ENCOUNTER (OUTPATIENT)
Dept: RADIOLOGY | Facility: HOSPITAL | Age: 75
Discharge: HOME | End: 2025-07-08
Attending: INTERNAL MEDICINE
Payer: MEDICARE

## 2025-07-08 VITALS — HEIGHT: 65 IN | BODY MASS INDEX: 24.16 KG/M2 | WEIGHT: 145 LBS

## 2025-07-08 DIAGNOSIS — M34.9 SCLERODERMA WITH PULMONARY INVOLVEMENT: ICD-10-CM

## 2025-07-08 DIAGNOSIS — J84.9 ILD (INTERSTITIAL LUNG DISEASE): ICD-10-CM

## 2025-07-08 DIAGNOSIS — R93.89 ABNORMAL CHEST CT: ICD-10-CM

## 2025-07-08 LAB
DLCO ADJ PRE: 11.35 ML/(MIN*MMHG) (ref 15.73–27.2)
DLCO SINGLE BREATH LLN: 15.73
DLCO SINGLE BREATH PRE REF: 48.3 %
DLCO SINGLE BREATH REF: 21.46
DLCOC SBVA LLN: 2.82
DLCOC SBVA PRE REF: 105.9 %
DLCOC SBVA REF: 4.2
DLCOC SINGLE BREATH LLN: 15.73
DLCOC SINGLE BREATH PRE REF: 52.9 %
DLCOC SINGLE BREATH REF: 21.46
DLCOCSBVAULN: 5.59
DLCOCSINGLEBREATHULN: 27.2
DLCOCSINGLEBREATHZSCORE: -2.9
DLCOSINGLEBREATHULN: 27.2
DLCOSINGLEBREATHZSCORE: -3.18
DLCOVA LLN: 2.82
DLCOVA PRE REF: 96.7 %
DLCOVA PRE: 4.07 ML/(MIN*MMHG*L) (ref 2.82–5.59)
DLCOVA REF: 4.2
DLCOVAULN: 5.59
DLVAADJ PRE: 4.45 ML/(MIN*MMHG*L) (ref 2.82–5.59)
ERV LLN: -16449.39
ERV PRE REF: 170.8 %
ERV REF: 0.61
ERVULN: ABNORMAL
FEF 25 75 LLN: 0.8
FEF 25 75 PRE REF: 70.5 %
FEF 25 75 REF: 1.79
FEV05 LLN: 0.85
FEV05 REF: 1.71
FEV1 FVC LLN: 64
FEV1 FVC PRE REF: 103.5 %
FEV1 FVC REF: 78
FEV1 LLN: 1.56
FEV1 PRE REF: 63 %
FEV1 REF: 2.17
FRCPLETH LLN: 1.95
FRCPLETH PREREF: 83.6 %
FRCPLETH REF: 2.77
FRCPLETHULN: 3.59
FVC LLN: 2.04
FVC PRE REF: 60.3 %
FVC REF: 2.83
IVC PRE: 1.61 L (ref 2.04–3.67)
IVC SINGLE BREATH LLN: 2.04
IVC SINGLE BREATH PRE REF: 56.7 %
IVC SINGLE BREATH REF: 2.83
IVCSINGLEBREATHULN: 3.67
LLN IC: -16447.95
PEF LLN: 3.8
PEF PRE REF: 95.8 %
PEF REF: 5.57
PHYSICIAN COMMENT: ABNORMAL
PRE DLCO: 10.37 ML/(MIN*MMHG) (ref 15.73–27.2)
PRE ERV: 1.05 L (ref -16449.39–16450.61)
PRE FEF 25 75: 1.26 L/S (ref 0.8–3.22)
PRE FET 100: 6.62 SEC
PRE FEV05 REF: 65.8 %
PRE FEV1 FVC: 80.27 % (ref 63.83–89.43)
PRE FEV1: 1.37 L (ref 1.56–2.76)
PRE FEV5: 1.12 L (ref 0.85–2.56)
PRE FRC PL: 2.32 L (ref 1.95–3.59)
PRE FVC: 1.71 L (ref 2.04–3.67)
PRE IC: 0.76 L (ref -16447.95–16452.05)
PRE PEF: 5.33 L/S (ref 3.8–7.33)
PRE REF IC: 36.9 %
PRE RV: 1.27 L (ref 1.58–2.73)
PRE TLC: 3.07 L (ref 4.12–6.09)
RAW PRE REF: 101.6 %
RAW PRE: 3.11 CMH2O*S/L (ref 3.06–3.06)
RAW REF: 3.06
REF IC: 2.05
RV LLN: 1.58
RV PRE REF: 58.8 %
RV REF: 2.16
RVTLC LLN: 34
RVTLC PRE REF: 94.2 %
RVTLC PRE: 41.25 % (ref 34.19–53.37)
RVTLC REF: 44
RVTLCULN: 53
RVULN: 2.73
SGAW PRE REF: 115.2 %
SGAW PRE: 0.12 1/(CMH2O*S) (ref 0.1–0.1)
SGAW REF: 0.1
TLC LLN: 4.12
TLC PRE REF: 60.2 %
TLC REF: 5.11
TLC ULN: 6.09
ULN IC: ABNORMAL
VA PRE: 2.55 L (ref 4.96–4.96)
VA SINGLE BREATH LLN: 4.96
VA SINGLE BREATH PRE REF: 51.5 %
VA SINGLE BREATH REF: 4.96
VASINGLEBREATHULN: 4.96
VC LLN: 2.04
VC PRE REF: 63.8 %
VC PRE: 1.81 L (ref 2.04–3.67)
VC REF: 2.83
VC ULN: 3.67

## 2025-07-08 PROCEDURE — 71250 CT THORAX DX C-: CPT | Mod: TC,TXP

## 2025-07-10 NOTE — PROCEDURES
Yamel Shah is a 74 y.o.   female patient, who presents for a 6 minute walk test ordered by DO Lu.  The diagnosis is Interstitial Lung Disease.  The patient's BMI is 24.1 kg/m2.  Predicted distance (lower limit of normal) is 302.03 meters.      Test Results:    The test was completed without stopping.   The total time walked was 360 seconds.  During walking, the patient reported:  No complaints. The patient used no assistive devices  during testing.     07/09/2025---------Distance: 426.72 meters (1400 feet)      Lap Walk Time  sec O2 Sat % Supplemental Oxygen  lpm Heart Rate  bpm Blood Pressure  mmHg Ju Scale   Pre-exercise  (Resting) 0  95 RA 89 144/65 3   During Exercise 1 52 95      During Exercise 2 100 92      During Exercise 3 150 92      During Exercise 4 200 92        During Exercise 5 250 92      During Exercise 6 295 92      End of Exercise  360 92  158/66 4   Post-exercise  (Recovery)   98 RA 95       Recovery Time: 215 seconds    Performing nurse/tech: Fadi ALVAREZ      PREVIOUS STUDY:   The patient had a previous study.  03/28/2025---------Distance: 327.66 meters (1075 feet)       O2 Sat % Supplemental Oxygen Heart Rate Blood Pressure Ju Scale Comment   Pre-exercise  (Resting) 100 % Room Air 78 bpm 129/62 mmHg 0 SpO2 by forehead oximetry probe.   During Exercise 98 % Room Air 84 bpm 139/61 mmHg 3     Post-exercise  (Recovery) 100 % Room Air  82 bpm              CLINICAL INTERPRETATION:  Six minute walk distance is 426.72 meters (1400 feet) with moderate dyspnea.  During exercise, there was significant desaturation while breathing room air.  Both blood pressure and heart rate remained stable with walking.  The patient did not report non-pulmonary symptoms during exercise.  The patient did complete the study, walking 360 seconds of the 360 second test.  Since the previous study in 3/2025, exercise capacity is significantly improved.  Based  upon age and body mass index, exercise capacity is normal.

## 2025-07-17 ENCOUNTER — TELEPHONE (OUTPATIENT)
Dept: TRANSPLANT | Facility: CLINIC | Age: 75
End: 2025-07-17
Payer: MEDICARE

## 2025-07-17 DIAGNOSIS — J84.9 ILD (INTERSTITIAL LUNG DISEASE): Primary | ICD-10-CM

## 2025-07-17 NOTE — TELEPHONE ENCOUNTER
"Message sent to patient, request to . Message sent to patient requesting to schedule follow up routine lab on 8/26 as well. Last labs 5/27.  ----- Message from Roberta Leigh DO sent at 7/16/2025  5:15 PM CDT -----  Regarding: RE: follow up CT chest  CT shows improvement.  Will follow up in 3 months from her last visit.  ----- Message -----  From: Johanna Fernandez  Sent: 7/15/2025   9:33 AM CDT  To: Roberta Leigh DO  Subject: follow up CT chest                               Please advise on follow up plan after updated CT chest 7/8/25.  "clinic follow up plan based on the results of testing scheduled in July".  Patient did not have labs as scheduled (early). Last CMP / CBC May 2025.     Thank you.  "

## 2025-07-21 ENCOUNTER — TELEPHONE (OUTPATIENT)
Dept: RHEUMATOLOGY | Facility: CLINIC | Age: 75
End: 2025-07-21
Payer: MEDICARE

## 2025-07-21 DIAGNOSIS — M34.9 SCLERODERMA: Primary | ICD-10-CM

## 2025-07-21 DIAGNOSIS — M34.9 SCLERODERMA WITH PULMONARY INVOLVEMENT: ICD-10-CM

## 2025-07-21 RX ORDER — MYCOPHENOLATE MOFETIL 500 MG/1
1000 TABLET ORAL 2 TIMES DAILY
Qty: 120 TABLET | Refills: 0 | Status: SHIPPED | OUTPATIENT
Start: 2025-07-21 | End: 2025-07-22

## 2025-07-21 NOTE — PRE-PROCEDURE INSTRUCTIONS
Anesthesia Pre-Op Instructions given:     -- YOUR SURGEON'S OFFICE WILL PROVIDE YOUR ARRIVAL TIME  -- Medication information (what to hold and what to take)   -- NPO guidelines as follows: (or as per your Surgeon)  Stop ALL solid food, gum, candy 8 hours before arrival time.  Stop all CLOUDY liquids: coffee with creamer, cloudy juices, 8 hours prior to arrival time.  The patient should be ENCOURAGED to drink carbohydrate-rich clear liquids (sports drinks, clear juices) until 2 hours prior to arrival time.  Stop clear liquids 2 hours prior to arrival time.  CLEAR liquids include water, black coffee NO creamer, clear oral rehydration drinks, clear sports drinks and clear fruit juices (no orange juice, no pulpy juices, no apple cider).   IF IN DOUBT, drink water instead.   NOTHING TO DRINK 2 hours before surgery/procedure time. If you are told to take medication on the morning of surgery, it may be taken with a sip of water.   -- *Arrival place and directions given*.  Time to be given the day before procedure by the Surgeon's Office   -- Bathe with antibacterial soap (dial or Hibiclens as instructed)  -- Don't wear any jewelry or valuables and not metals on skin or hair AM of surgery   -- No makeup or moisturizer to face   -- No perfume/cologne, powder, lotions, aftershave or deodorant     Pt verbalized understanding.            *If going to , see below:      Directions and Instructions for [facility]   At [facility], we have an outstanding team of physicians, anesthesiologists, CRNAs, Registered Nurses, Surgical Technologists, and other ancillary team members all focused on your surgical and procedural care.   Before Your Procedure:   The physician's office will call you with a specific arrival time and directions a day or two before your scheduled procedure. You may also receive these instructions through your [facility] portal.   Day of Procedure:   Please be sure to arrive at  the arrival time given or you may risk your surgery being delayed or canceled. The arrival time is earlier than your scheduled surgery or procedure time. In the winter months please dress warm and bring blankets for you or your child as the waiting room may be cold. If you have difficulty locating the facility, please give us a call at [phone].   Directions:   The [facility] is located on the 1st floor of the hospital building near the [place] entrance.   Parking:   You will park in the [facility] (note location on map). [facility] opens at 5:00 a.m. and has a drop off area by the entrance.  parking is available starting at 7:00 a.m. Please see below for further  parking instructions.   Directions from the parking garage elevators   Blue [facility] Elevators: From the parking garage, take the blue [facility] elevators (located in the center of the parking garage) to the 1st floor of the garage. You will then take a right once off the elevators then another right to the outside of the parking garage. You will be across from the [facility]. You will walk down the sidewalk, pass the  curve at the [place] entrance and continue to follow the sidewalk. You will pass the radiation oncology entrance on your right. Continue to follow the sidewalk to the [facility] glass door entrance.   Hospital Entrance (Inside Route): If a mostly inside route is preferred: Take the inside elevator bank (located at the far north end of the garage) from the parking garage to the 1st floor. On the 1st floor walk past [employer]. Keep walking down the center of the hallway towards the hospital elevators. Once you reach the red brick charles, take a left and go past the hospital elevators. Take another left and follow the blue and white [place] signs around the hallway to the end. Go outside of the door. You will see the [facility]  Center entrance to your right.   Drop Off:   There is a drop off area at the doors of the [facility] for your convenience. If utilized for pediatric patients, an adult must accompany the patient into the surgery center while another adult johnson the vehicle.    (at 7:00 a.m.):   Upon check-in, please let the  know that you are utilizing [facility] parking which is free. The . will then call [employer] for your car to be picked up. Your keys and phone number will be collected and given to [facility] services. You will then be given a ticket. Upon discharge, [facility] will be notified to bring your vehicle back when you are ready.           Directions to [facility] [place]:  [phone]     From 1st floor garage elevators: go past [facility] [employer] shop. Look for Black piano on side of coffee shop. Take Atrium (gold) elevator by piano up to 2nd floor. When you exit elevator follow long hallway (you should see a sign hanging from the ceiling that says Day of Surgery Family Waiting Room. When hallway ends you will be entering the day of surgery waiting area. Check in at the desk for your procedure.      From [facility] entrance: Make a right after you enter the door to the hospital. On your Left, take [place] elevator to 2nd floor. Check in at desk      From Lab desk on 2nd floor: Exit lab area toward hospital atrium. You should see a sign that says Day of Surgery Family Waiting Area. Make a Left and follow long hallway (you should see a sign hanging from the ceiling that says Day of Surgery Family Waiting Room. When hallway ends you will be entering the day of surgery waiting area. Check in at the desk for your procedure.

## 2025-07-22 ENCOUNTER — TELEPHONE (OUTPATIENT)
Dept: GASTROENTEROLOGY | Facility: CLINIC | Age: 75
End: 2025-07-22
Payer: MEDICARE

## 2025-07-22 ENCOUNTER — TELEPHONE (OUTPATIENT)
Dept: SURGERY | Facility: CLINIC | Age: 75
End: 2025-07-22
Payer: MEDICARE

## 2025-07-22 DIAGNOSIS — M34.9 SCLERODERMA WITH PULMONARY INVOLVEMENT: ICD-10-CM

## 2025-07-22 DIAGNOSIS — M34.9 SCLERODERMA: ICD-10-CM

## 2025-07-22 RX ORDER — MYCOPHENOLATE MOFETIL 500 MG/1
1000 TABLET ORAL 2 TIMES DAILY
Qty: 120 TABLET | Refills: 0 | Status: ACTIVE | OUTPATIENT
Start: 2025-07-22

## 2025-07-22 NOTE — TELEPHONE ENCOUNTER
MA left a vm letting patient know that we have scheduled her to see Natalie per Dr. Benites an appointment letter was sent out as well.

## 2025-07-23 ENCOUNTER — PATIENT MESSAGE (OUTPATIENT)
Dept: RHEUMATOLOGY | Facility: CLINIC | Age: 75
End: 2025-07-23
Payer: MEDICARE

## 2025-07-23 ENCOUNTER — HOSPITAL ENCOUNTER (OUTPATIENT)
Facility: HOSPITAL | Age: 75
Discharge: HOME | End: 2025-07-23
Attending: SURGERY | Admitting: SURGERY
Payer: MEDICARE

## 2025-07-23 ENCOUNTER — ANESTHESIA (OUTPATIENT)
Dept: SURGERY | Facility: HOSPITAL | Age: 75
End: 2025-07-23
Payer: MEDICARE

## 2025-07-23 ENCOUNTER — ANESTHESIA EVENT (OUTPATIENT)
Dept: SURGERY | Facility: HOSPITAL | Age: 75
End: 2025-07-23
Payer: MEDICARE

## 2025-07-23 VITALS
BODY MASS INDEX: 23.11 KG/M2 | TEMPERATURE: 97 F | HEART RATE: 68 BPM | DIASTOLIC BLOOD PRESSURE: 65 MMHG | HEIGHT: 65 IN | RESPIRATION RATE: 12 BRPM | SYSTOLIC BLOOD PRESSURE: 142 MMHG | WEIGHT: 138.69 LBS | OXYGEN SATURATION: 97 %

## 2025-07-23 DIAGNOSIS — R59.0 LYMPHADENOPATHY, INGUINAL: ICD-10-CM

## 2025-07-23 PROCEDURE — 71000044 HC DOSC ROUTINE RECOVERY FIRST HOUR: Mod: NTX | Performed by: SURGERY

## 2025-07-23 PROCEDURE — 71000016 HC POSTOP RECOV ADDL HR: Mod: TXP | Performed by: SURGERY

## 2025-07-23 PROCEDURE — 36000706: Mod: TXP | Performed by: SURGERY

## 2025-07-23 PROCEDURE — 63600175 PHARM REV CODE 636 W HCPCS: Mod: TXP | Performed by: SURGERY

## 2025-07-23 PROCEDURE — 88341 IMHCHEM/IMCYTCHM EA ADD ANTB: CPT | Mod: TC,91,TXP | Performed by: SURGERY

## 2025-07-23 PROCEDURE — 37000008 HC ANESTHESIA 1ST 15 MINUTES: Mod: TXP | Performed by: SURGERY

## 2025-07-23 PROCEDURE — 25000003 PHARM REV CODE 250: Mod: TXP | Performed by: SURGERY

## 2025-07-23 PROCEDURE — 38531 OPEN BX/EXC INGUINOFEM NODES: CPT | Mod: RT,GC,NTX, | Performed by: SURGERY

## 2025-07-23 PROCEDURE — 63600175 PHARM REV CODE 636 W HCPCS: Mod: TXP | Performed by: STUDENT IN AN ORGANIZED HEALTH CARE EDUCATION/TRAINING PROGRAM

## 2025-07-23 PROCEDURE — 25000003 PHARM REV CODE 250: Mod: TXP | Performed by: STUDENT IN AN ORGANIZED HEALTH CARE EDUCATION/TRAINING PROGRAM

## 2025-07-23 PROCEDURE — 88184 FLOWCYTOMETRY/ TC 1 MARKER: CPT | Mod: 91,TXP | Performed by: SURGERY

## 2025-07-23 PROCEDURE — 27201423 OPTIME MED/SURG SUP & DEVICES STERILE SUPPLY: Mod: NTX | Performed by: SURGERY

## 2025-07-23 PROCEDURE — 36000707: Mod: TXP | Performed by: SURGERY

## 2025-07-23 PROCEDURE — 71000015 HC POSTOP RECOV 1ST HR: Mod: NTX | Performed by: SURGERY

## 2025-07-23 PROCEDURE — 37000009 HC ANESTHESIA EA ADD 15 MINS: Mod: NTX | Performed by: SURGERY

## 2025-07-23 PROCEDURE — 88189 FLOWCYTOMETRY/READ 16 & >: CPT | Mod: NTX,,, | Performed by: PATHOLOGY

## 2025-07-23 RX ORDER — GLUCAGON 1 MG
1 KIT INJECTION
Status: DISCONTINUED | OUTPATIENT
Start: 2025-07-23 | End: 2025-07-23 | Stop reason: HOSPADM

## 2025-07-23 RX ORDER — DEXAMETHASONE SODIUM PHOSPHATE 4 MG/ML
INJECTION, SOLUTION INTRA-ARTICULAR; INTRALESIONAL; INTRAMUSCULAR; INTRAVENOUS; SOFT TISSUE
Status: DISCONTINUED | OUTPATIENT
Start: 2025-07-23 | End: 2025-07-23

## 2025-07-23 RX ORDER — ONDANSETRON HYDROCHLORIDE 2 MG/ML
INJECTION, SOLUTION INTRAVENOUS
Status: DISCONTINUED | OUTPATIENT
Start: 2025-07-23 | End: 2025-07-23

## 2025-07-23 RX ORDER — OXYCODONE HYDROCHLORIDE 5 MG/1
TABLET ORAL
Status: DISCONTINUED
Start: 2025-07-23 | End: 2025-07-23 | Stop reason: HOSPADM

## 2025-07-23 RX ORDER — LIDOCAINE HYDROCHLORIDE 20 MG/ML
INJECTION INTRAVENOUS
Status: DISCONTINUED | OUTPATIENT
Start: 2025-07-23 | End: 2025-07-23

## 2025-07-23 RX ORDER — CEFAZOLIN 2 G/1
2 INJECTION, POWDER, FOR SOLUTION INTRAMUSCULAR; INTRAVENOUS
Status: COMPLETED | OUTPATIENT
Start: 2025-07-23 | End: 2025-07-23

## 2025-07-23 RX ORDER — ACETAMINOPHEN 500 MG
1000 TABLET ORAL EVERY 8 HOURS
Qty: 30 TABLET | Refills: 0 | Status: SHIPPED | OUTPATIENT
Start: 2025-07-23 | End: 2025-07-28

## 2025-07-23 RX ORDER — METHOCARBAMOL 500 MG/1
500 TABLET, FILM COATED ORAL 3 TIMES DAILY
Qty: 10 TABLET | Refills: 0 | Status: SHIPPED | OUTPATIENT
Start: 2025-07-23 | End: 2025-07-23 | Stop reason: HOSPADM

## 2025-07-23 RX ORDER — FENTANYL CITRATE 50 UG/ML
INJECTION, SOLUTION INTRAMUSCULAR; INTRAVENOUS
Status: DISCONTINUED | OUTPATIENT
Start: 2025-07-23 | End: 2025-07-23

## 2025-07-23 RX ORDER — PROPOFOL 10 MG/ML
VIAL (ML) INTRAVENOUS CONTINUOUS PRN
Status: DISCONTINUED | OUTPATIENT
Start: 2025-07-23 | End: 2025-07-23

## 2025-07-23 RX ORDER — FENTANYL CITRATE 50 UG/ML
25 INJECTION, SOLUTION INTRAMUSCULAR; INTRAVENOUS EVERY 5 MIN PRN
Status: DISCONTINUED | OUTPATIENT
Start: 2025-07-23 | End: 2025-07-23 | Stop reason: HOSPADM

## 2025-07-23 RX ORDER — SODIUM CHLORIDE 0.9 % (FLUSH) 0.9 %
10 SYRINGE (ML) INJECTION
Status: DISCONTINUED | OUTPATIENT
Start: 2025-07-23 | End: 2025-07-23 | Stop reason: HOSPADM

## 2025-07-23 RX ORDER — FENTANYL CITRATE 50 UG/ML
INJECTION, SOLUTION INTRAMUSCULAR; INTRAVENOUS
Status: DISCONTINUED
Start: 2025-07-23 | End: 2025-07-23 | Stop reason: HOSPADM

## 2025-07-23 RX ORDER — ACETAMINOPHEN 500 MG
1000 TABLET ORAL EVERY 8 HOURS PRN
Status: DISCONTINUED | OUTPATIENT
Start: 2025-07-23 | End: 2025-07-23 | Stop reason: HOSPADM

## 2025-07-23 RX ORDER — BUPIVACAINE HYDROCHLORIDE 2.5 MG/ML
INJECTION, SOLUTION EPIDURAL; INFILTRATION; INTRACAUDAL; PERINEURAL
Status: DISCONTINUED | OUTPATIENT
Start: 2025-07-23 | End: 2025-07-23 | Stop reason: HOSPADM

## 2025-07-23 RX ORDER — MIDAZOLAM HYDROCHLORIDE 2 MG/2ML
INJECTION, SOLUTION INTRAMUSCULAR; INTRAVENOUS
Status: DISCONTINUED
Start: 2025-07-23 | End: 2025-07-23 | Stop reason: HOSPADM

## 2025-07-23 RX ORDER — SODIUM CHLORIDE 9 MG/ML
INJECTION, SOLUTION INTRAVENOUS CONTINUOUS
Status: DISCONTINUED | OUTPATIENT
Start: 2025-07-23 | End: 2025-07-23 | Stop reason: HOSPADM

## 2025-07-23 RX ORDER — LIDOCAINE HYDROCHLORIDE AND EPINEPHRINE 10; 10 UG/ML; MG/ML
INJECTION, SOLUTION INFILTRATION; PERINEURAL
Status: DISCONTINUED | OUTPATIENT
Start: 2025-07-23 | End: 2025-07-23 | Stop reason: HOSPADM

## 2025-07-23 RX ORDER — HALOPERIDOL LACTATE 5 MG/ML
0.5 INJECTION, SOLUTION INTRAMUSCULAR EVERY 10 MIN PRN
Status: DISCONTINUED | OUTPATIENT
Start: 2025-07-23 | End: 2025-07-23 | Stop reason: HOSPADM

## 2025-07-23 RX ADMIN — ONDANSETRON 4 MG: 2 INJECTION INTRAMUSCULAR; INTRAVENOUS at 10:07

## 2025-07-23 RX ADMIN — PROPOFOL 80 MCG/KG/MIN: 10 INJECTION, EMULSION INTRAVENOUS at 09:07

## 2025-07-23 RX ADMIN — LIDOCAINE HYDROCHLORIDE 60 MG: 20 INJECTION INTRAVENOUS at 09:07

## 2025-07-23 RX ADMIN — DEXAMETHASONE SODIUM PHOSPHATE 4 MG: 4 INJECTION, SOLUTION INTRAMUSCULAR; INTRAVENOUS at 10:07

## 2025-07-23 RX ADMIN — FENTANYL CITRATE 25 MCG: 50 INJECTION, SOLUTION INTRAMUSCULAR; INTRAVENOUS at 09:07

## 2025-07-23 RX ADMIN — ACETAMINOPHEN 1000 MG: 500 TABLET ORAL at 11:07

## 2025-07-23 RX ADMIN — CEFAZOLIN 2 G: 330 INJECTION, POWDER, FOR SOLUTION INTRAMUSCULAR; INTRAVENOUS at 09:07

## 2025-07-23 RX ADMIN — SODIUM CHLORIDE: 0.9 INJECTION, SOLUTION INTRAVENOUS at 08:07

## 2025-07-23 RX ADMIN — PROPOFOL 30 MG: 10 INJECTION, EMULSION INTRAVENOUS at 09:07

## 2025-07-23 NOTE — PLAN OF CARE
Awaiting bedside delivery, co pay for RX $1.00. Patient tolerating oral liquids without difficulty. No apparent s&s of distress noted at this time, no complaints voiced at this time. Bedside report given to ANTON Calhoun re-assuming care.

## 2025-07-23 NOTE — PROGRESS NOTES
Discharge instructions reviewed w/pt and husabnd, verbalized understanding. Pt in NADN.Pain tolerable at this time. Tolerated liquids w/ no issues. To be d/c'd home w/ .

## 2025-07-23 NOTE — ANESTHESIA PREPROCEDURE EVALUATION
07/23/2025  Yamel Shah is a 74 y.o., female HTN, ROXANNE, scleroderma complicated by ILD, pHTN, Raynaud's, absent esophageal contractility and DGE, and hiatal hernia with GERD c/b non-dysplastic Pagan's esophagus s/p robotic hiatal hernia repair with gastropexy and pyloromyotomy 3/23/23, followed by robotic right inguinofemoral hernia repair with R inguinal LN excisional bx 5/26/23 with final pathology c/w reactive LN     Here for  BIOPSY, LYMPH NODE, INGUINAL, RIGHT possible LEFT (N/A)        Pre-op Assessment    I have reviewed the Patient Summary Reports.     I have reviewed the Nursing Notes. I have reviewed the NPO Status.   I have reviewed the Medications.     Review of Systems  Anesthesia Hx:  No problems with previous Anesthesia             Denies Family Hx of Anesthesia complications.    Denies Personal Hx of Anesthesia complications.                    Social:  Non-Smoker       Hematology/Oncology:  Hematology Normal   Oncology Normal                                   EENT/Dental:  EENT/Dental Normal           Cardiovascular:     Hypertension               Pulmonary HTN PASP est 43 on 2D echo March 2024                           Pulmonary:  Pneumonia       Pulmonary fibrosis, scleroderma               Renal/:  Renal/ Normal                 Hepatic/GI:    Hiatal Hernia, GERD                Musculoskeletal:  Musculoskeletal Normal                Neurological:      Headaches                                 Endocrine:  Endocrine Normal            Dermatological:  Skin Normal    Psych:  Psychiatric Normal                    Physical Exam  General: Alert and Oriented    Airway:  Mallampati: II / II  Mouth Opening: Normal  TM Distance: Normal  Tongue: Normal  Neck ROM: Normal ROM    Dental:  Intact    Chest/Lungs:  Clear to auscultation, Normal Respiratory Rate    Heart:  Rate:  Normal  Rhythm: Regular Rhythm  Sounds: Normal        Anesthesia Plan  Type of Anesthesia, risks & benefits discussed:    Anesthesia Type: Gen Natural Airway, MAC  Intra-op Monitoring Plan: Standard ASA Monitors  Post Op Pain Control Plan: multimodal analgesia  Induction:  IV  Airway Plan: Direct  Informed Consent: Informed consent signed with the Patient and all parties understand the risks and agree with anesthesia plan.  All questions answered.   ASA Score: 3  Day of Surgery Review of History & Physical: H&P Update referred to the surgeon/provider.    Ready For Surgery From Anesthesia Perspective.     .

## 2025-07-23 NOTE — OP NOTE
DATE OF PROCEDURE: 7/23/2025    PRE OP DIAGNOSIS: Lymphadenopathy    POST OP DIAGNOSIS: Lymphadenopathy    PROCEDURE: Procedure(s) (LRB):  BIOPSY, LYMPH NODE, INGUINAL, RIGHT (Right)    Surgeons and Role:     * Katherine Guevara MD - Primary     * Taina Ruiz MD - Resident - Assisting    ANESTHESIA: General    INDICATION: Patient is a 74 year-old woman with HTN, scleroderma and ILD who presents with progressive lymphadenopathy of the abdomen and pelvis. After discussing the risks, benefits and alternatives to excisional lymph node biopsy, she elected to proceed. Informed consent was obtained.     FINDINGS:  Excision of 4 cm right inguinal lymph node     DESCRIPTION OF OPERATION:  Patient was met in the pre-op area, her identity and consent confirmed and surgical site marked. She was then brought to the Operating Room and placed in the supine position. Sedation was provided. SCDs and a warming blanket were placed and 2g cefazolin infused within 30 minutes of the incision. Her surgical site was prepped and draped in sterile fashion and a pre-procedural pause performed by all members of the surgical and anesthesia teams. An incision was made in her right thigh overlying the area of palpable lymphadenopathy after local anesthetic was injected into the dermal tissues. Dissection was carried through the subcutaneous tissue to an enlarged lymph node. This was dissected free from the subcutaneous tissues and adjacent lymph nodes circumferentially. Once completed dissected free this was passed off the field, sharply divided in half and sent to pathology for both lymphoma/leukemia screen and permanent evaluation. The wound was irrigated and hemostasis ensured including placement of a small piece of Surgicel Snow. The soft tissue was closed in layers with 2-0 Vicryl and the skin closed with 4-0 Monocryl in running subcuticular fashion and reinforced with Dermabond and a gauze dressing. The patient was awoken  from anesthesia without incident. The patient was brought to PACU in good condition having tolerated the operation well. Surgical sponge and needle count was correct at the end of the case.      EBL: 2 mL  Specimen: Right inguinal lymph node for lymphoma/leukemia screen and permament pathology  Drains: None  Complications: None apparent  Dispo: Home from PACU     I was present and scrubbed for the entirety of this operation.    Katherine Segura  7/23/2025

## 2025-07-23 NOTE — BRIEF OP NOTE
[facility] ([facility])  Brief Operative Note    Surgery Date: 7/23/2025     Surgeons and Role:     * Katherine Guevara MD - Primary     * Taina Ruiz MD    Assisting Surgeon: None    Pre-op Diagnosis:  Lymphadenopathy, inguinal [R59.0]    Post-op Diagnosis:  Post-Op Diagnosis Codes:     * Lymphadenopathy, inguinal [R59.0]    Procedure(s) (LRB):  BIOPSY, LYMPH NODE, INGUINAL, RIGHT (Right)    Anesthesia: Local MAC    Operative Findings: Right inguinal lymph node excisional biopsy. Specimens sent for lymphoma protocol.    Estimated Blood Loss: minimal         Specimens:   Specimen (24h ago, onward)       Start     Ordered    07/23/25 1003  Specimen to Pathology General Surgery  RELEASE UPON ORDERING        Comments: Specimen 1: 1. Right Inguinal Lymph Node (lymphoma screen)     References:    Click here for ordering Quick Tip   Question:  Release to patient  Answer:  Immediate    07/23/25 1003                    ID Type Source Tests Collected by Time Destination   1 : 1) Right Inguinal Lymph Node (lymphoma screen) Tissue Lymph Node(s), Inguinal, Right SPECIMEN TO PATHOLOGY Katherine Guevara MD 7/23/2025 0929    A : A. Right Inguinal Lymph Node (lymphoma screen) Tissue Lymph Node LEUKEMIA/LYMPHOMA SCREEN - LYMPH NODE Katherine Guevara MD 7/23/2025 0929            Discharge Note    OUTCOME: Patient tolerated treatment/procedure well without complication and is now ready for discharge.    DISPOSITION: Home or Self Care    FINAL DIAGNOSIS:  Lymphadenopathy, inguinal [R59.0]    FOLLOWUP: In clinic    DISCHARGE INSTRUCTIONS:    Discharge Procedure Orders   Diet Adult Regular     Notify your health care provider if you experience any of the following:  temperature >100.4     Notify your health care provider if you experience any of the following:  persistent nausea and vomiting or diarrhea     Notify your health care provider if you experience any of the following:  severe uncontrolled  pain     Notify your health care provider if you experience any of the following:  redness, tenderness, or signs of infection (pain, swelling, redness, odor or green/yellow discharge around incision site)     Notify your health care provider if you experience any of the following:  difficulty breathing or increased cough     Weight bearing restrictions (specify):   Order Comments: Do not lift greater than 10 lbs for 4 weeks

## 2025-07-23 NOTE — DISCHARGE INSTRUCTIONS
Discharge Instructions     WOUND CARE  -- You have skin glue over your incision(s); this will slowly flake away over the next few weeks. Do not pick at surgical glue, it will come off on its own.   -- You also have a gauze and an adhesive dressing over the incision. Okay to remove this 2 days after surgery.   -- Ok to shower; however, no baths or submerging in water (I.e. swimming, submerging in water) for at least two weeks.  -- Please keep the incision clean with soap and water, pat your incision dry, do not scrub hard over your incisions.     MEDICATIONS AND PAIN CONTROL  -- Please resume all home medications as instructed and take any newly prescribed medications.  -- Most people find that over-the-counter pain medications (Tylenol combined with Ibuprofen) will be sufficient for most pain control.     OTHER INSTRUCTIONS  -- Monitor for temp > 101 F, bleeding, redness, purulent drainage, or any sudden, new extreme pain. If any occur, please call our clinic or go to the emergency department if after normal business hours.  -- You may resume your regular diet as tolerated.   -- No heavy lifting (anything >10 lbs or = to a gallon of milk) or strenuous exercise until cleared by a physician. No pushing or pulling activities such as vacuuming or raking. Otherwise, activity as tolerated.   -- Follow up with Dr. Segura in 2 weeks in clinic for a post-op check.

## 2025-07-24 ENCOUNTER — TELEPHONE (OUTPATIENT)
Dept: INTERNAL MEDICINE | Facility: CLINIC | Age: 75
End: 2025-07-24
Payer: MEDICARE

## 2025-07-24 NOTE — TELEPHONE ENCOUNTER
I called her and we mo mary appts sooner in October.    Labs moved up too.    New appt slips mailed.

## 2025-07-24 NOTE — TELEPHONE ENCOUNTER
Copied from CRM #[id number]. Topic: Appointments - Appointment Rescheduling  >> Jul 24, 2025  3:12 PM Astrid wrote:  Type:  Sooner Apoointment Request    Caller is requesting a sooner appointment.  Caller declined first available appointment listed below.  Caller will not accept being placed on the waitlist and is requesting a message be sent to doctor.  Name of Caller:Yamel Shah  When is the first available appointment?01/2026  Symptoms:6 mo f/u   Would the patient rather a call back or a response via [facility]? phone  Best Call Back Number:[phone]  Additional Information: patient stated that she wont be available on that date, but if that can  rescheduled for early appointment a few weeks earlier than 10/30/25. Please call and advise. Thank you.

## 2025-07-25 ENCOUNTER — OFFICE VISIT (OUTPATIENT)
Dept: PODIATRY | Facility: CLINIC | Age: 75
End: 2025-07-25
Payer: MEDICARE

## 2025-07-25 VITALS
HEART RATE: 75 BPM | HEIGHT: 65 IN | DIASTOLIC BLOOD PRESSURE: 64 MMHG | SYSTOLIC BLOOD PRESSURE: 136 MMHG | BODY MASS INDEX: 23.08 KG/M2

## 2025-07-25 DIAGNOSIS — L03.119 CELLULITIS OF LOWER EXTREMITY, UNSPECIFIED LATERALITY: ICD-10-CM

## 2025-07-25 DIAGNOSIS — L03.116 CELLULITIS OF LEFT LOWER EXTREMITY: ICD-10-CM

## 2025-07-25 DIAGNOSIS — L97.922 ULCERS OF BOTH LOWER EXTREMITIES WITH FAT LAYER EXPOSED: Primary | ICD-10-CM

## 2025-07-25 DIAGNOSIS — L97.912 ULCERS OF BOTH LOWER EXTREMITIES WITH FAT LAYER EXPOSED: Primary | ICD-10-CM

## 2025-07-25 LAB
LAB FLOW CYTOMETRY ANTIBODIES ANALYZED (OHS): NORMAL
LAB FLOW CYTOMETRY COMMENT (OHS): NORMAL
LAB FLOW CYTOMETRY INTERPRETATION (OHS): NORMAL
LABORATORY COMMENT REPORT: NORMAL

## 2025-07-25 PROCEDURE — 99999 PR PBB SHADOW E&M-EST. PATIENT-LVL IV: CPT | Mod: PBBFAC,,, | Performed by: STUDENT IN AN ORGANIZED HEALTH CARE EDUCATION/TRAINING PROGRAM

## 2025-07-25 PROCEDURE — 99214 OFFICE O/P EST MOD 30 MIN: CPT | Mod: PBBFAC | Performed by: STUDENT IN AN ORGANIZED HEALTH CARE EDUCATION/TRAINING PROGRAM

## 2025-07-25 NOTE — ANESTHESIA POSTPROCEDURE EVALUATION
Anesthesia Post Evaluation    Patient: Yamel Shah    Procedure(s) Performed: Procedure(s) (LRB):  BIOPSY, LYMPH NODE, INGUINAL, RIGHT (Right)    Final Anesthesia Type: general      Patient location during evaluation: PACU  Patient participation: Yes- Able to Participate  Level of consciousness: awake and alert  Post-procedure vital signs: reviewed and stable  Pain management: adequate  Airway patency: patent  MARY mitigation strategies: Multimodal analgesia, Preoperative use of mandibular advancement devices or oral appliances and Intraoperative administration of CPAP, nasopharyngeal airway, or oral appliance during sedation  PONV status at discharge: No PONV  Anesthetic complications: no      Cardiovascular status: blood pressure returned to baseline, hemodynamically stable and stable  Respiratory status: unassisted and spontaneous ventilation  Hydration status: euvolemic  Follow-up not needed.              Vitals Value Taken Time   /65 07/23/25 12:50   Temp 36.2 °C (97.1 °F) 07/23/25 12:50   Pulse 68 07/23/25 12:50   Resp 12 07/23/25 12:50   SpO2 97 % 07/23/25 12:50         No case tracking events are documented in the log.      Pain/Lory Score: No data recorded

## 2025-07-25 NOTE — PROGRESS NOTES
Subjective:     Patient    Yamel Shah is a 74 y.o. female.    Problem    2024: Previously seen by multiple wound care providers without significant improvement in wounds, has bounced around a lot. Has bilateral foot wounds related to scleroderma and immunosuppressant medications. Had improvement in granulation with regular wound care, oral and topical antibiotics, one debridement. Attempted topical vitamin E but caused burning.     01/03/25: Returns for bilateral foot wound care. No new concerns. Had pain flare on left foot, still sensitive.     01/08/25: Returns for bilateral foot wound care. No new concerns.     01/16/25: Returns for bilateral foot wound care. Awaiting appointment with ID 02/14. No new concerns.     01/24/25: Returns for bilateral foot wound care. Pain controlled on current medications. No new concerns.     01/31/25: Returns for bilateral foot wound care. Not tolerating amitriptyline well; still with nerve pain, tingling, cramping, burning despite duloxetine, tolerates duloxetine well.     02/07/25: Returns for bilateral foot wound care. Increased duloxetine at last visit for BLE nerve pain with significant improvement in pain without side effects. ID appointment rescheduled to 02/24.     02/14/25: Returns for bilateral foot wound care. Wants to restart PO antibiotics until she sees ID, thinks she was progressing better at that time.     02/21/25: Returns for bilateral foot wound care. Wounds seem somewhat better on PO antibiotics, ID appointment next week.     02/25/25: Returns for bilateral foot wound care. Completed recent PO antibiotics, recent cultures + for Pseudomonas resistant to PO antibiotics; has first ID appointment tomorrow.     03/07/25: Returns for bilateral foot wound care. Getting OPAT per ID soon. Pain OK.     03/12/25: Returns for bilateral foot wound care. Will start antibiotics within next week. Pain OK except severe at left 2nd toe dorsally. Wants  debridement of left 2nd toe today.     03/19/25: Returns for bilateral foot wound care. On IV antibiotics. No new concerns.     03/27/25: Returns for bilateral foot wound care. Still on IV antibiotics. No new concerns.     04/04/25: Returns for bilateral foot wound care. Stopped IV antibiotics but still has the line in. Going on vacation for 2 weeks soon.     04/25/25: Returns for bilateral foot wound care. Newer cultures with polymicrobial growth, ID not concerned. Does not feel wounds significantly improved with IV antibiotics.     05/01/25: Returns for bilateral foot wound care. Awaiting topical antibiotics from [facility].     05/30/25: Returns for bilateral foot wound care. Has received antibiotics from [facility]. Seen by Dr Ledbetter 1 week ago for dressing change. Wounds improving drastically.     06/12/25: Returns for bilateral foot wound care. Wounds continue to improve, no new concerns.     06/27/25: Returns for bilateral foot wound care. No new concerns.     07/25/25: Returns for bilateral foot wound care. No new concerns.     History    History obtained from patient and review of medical records.     Past Medical History:   Diagnosis Date    Abnormal Pap smear     Acid reflux     Allergy     Arthritis     Encounter for blood transfusion     GERD (gastroesophageal reflux disease)     Hiatal hernia 03/24/2023    History of migraine headaches     Hx of colonic polyp     Hypertension     Idiopathic neuropathy 07/20/2012    ILD (interstitial lung disease) 11/06/2013    Iron deficiency anemia 03/18/2014    MRSA carrier     Osteopenia     Pneumonia     Pulmonary fibrosis     Pulmonary hypertension     Raynaud's disease     Scleroderma, diffuse     Sjogren's syndrome     Vitamin D deficiency 11/14/2013       Past Surgical History:   Procedure Laterality Date    24 HOUR IMPEDANCE PH MONITORING OF ESOPHAGUS IN PATIENT NOT TAKING ACID REDUCING MEDICATIONS N/A 03/04/2020    Procedure: IMPEDANCE PH  STUDY, ESOPHAGEAL, 24 HOUR, IN PATIENT NOT TAKING ACID REDUCING MEDICATION;  Surgeon: Annamaria Mendoza MD;  Location: [facility] ENDO (4TH FLR);  Service: Endoscopy;  Laterality: N/A;  OFF PPI/H2 Blocker   Motility Studies   Hold Narcotics x 1 days   Hold TCA x 1 days  2/26 - LVM attempting to confirm appt  2/27 - Confirmed appt    ANORECTAL MANOMETRY N/A 05/10/2021    Procedure: MANOMETRY, ANORECTAL with balloon expulsion test;  Surgeon: Annamaria Mendoza MD;  Location: [facility] ENDO (4TH FLR);  Service: Endoscopy;  Laterality: N/A;  order combined  covid test 5/7 [facility], instructions emailed-[facility]    BIOPSY OF INGUINAL LYMPH NODE Right 7/23/2025    Procedure: BIOPSY, LYMPH NODE, INGUINAL, RIGHT;  Surgeon: Katherine Guevara MD;  [facility];  Service: General;  Laterality: Right;    BREAST BIOPSY      Left, benign    BRONCHOSCOPY N/A 10/2/2023    Procedure: bronch;  Surgeon: Roberta Leigh DO;  [facility];  Service: Endoscopy;  Laterality: N/A;    BRONCHOSCOPY N/A 9/16/2024    Procedure: Flexible bronchoscopy (BAL only);  Surgeon: Roberta Leigh DO;  [facility];  Service: Endoscopy;  Laterality: N/A;    CERVICAL CONIZATION   W/ LASER  1970    COLONOSCOPY      COLONOSCOPY N/A 03/29/2019    Procedure: COLONOSCOPY;  Surgeon: Annamaria Mendoza MD;  Location: [facility] ENDO (2ND FLR);  Service: Endoscopy;  Laterality: N/A;    COLONOSCOPY N/A 05/10/2021    Procedure: COLONOSCOPY;  Surgeon: Annamaria Mendoza MD;  Location: [facility] ENDO (4TH FLR);  Service: Endoscopy;  Laterality: N/A;  2nd floor-previous scopes done on 2nd floor, gastroparesis  full liquid diet x2 days, clear liquid x1 day prior to procedure  covid test 5/7 [facility], instructions emailed-[facility]  5/6 pt confirmed appt-[facility]    DILATION AND CURETTAGE OF UTERUS      ESOPHAGEAL MANOMETRY WITH MEASUREMENT OF IMPEDANCE N/A 03/04/2020    Procedure: MANOMETRY, ESOPHAGUS, WITH IMPEDANCE MEASUREMENT;  Surgeon: Annamaria RUIZ  MD Kelly;  Location: [facility] ENDO (4TH FLR);  Service: Endoscopy;  Laterality: N/A;  OFF PPI/H2 Blocker   Motility Studies   Hold Narcotics x 1 days   Hold TCA x 1 days    ESOPHAGOGASTRODUODENOSCOPY      ESOPHAGOGASTRODUODENOSCOPY N/A 03/29/2019    Procedure: EGD (ESOPHAGOGASTRODUODENOSCOPY);  Surgeon: Annamaria Mendoza MD;  Location: [facility] ENDO (2ND FLR);  Service: Endoscopy;  Laterality: N/A;  pulmonary htn    ESOPHAGOGASTRODUODENOSCOPY N/A 02/02/2021    Procedure: ESOPHAGOGASTRODUODENOSCOPY (EGD);  Surgeon: Annamaria Mendoza MD;  Location: [facility] ENDO (2ND FLR);  Service: Endoscopy;  Laterality: N/A;  2nd floor gastroparesis  3 days full liquid diet and 1 day clears  covid test 1/30 primary care, instructions sent to [facility]    ESOPHAGOGASTRODUODENOSCOPY N/A 07/01/2022    Procedure: ESOPHAGOGASTRODUODENOSCOPY (EGD);  Surgeon: Annamaria Mendoza MD;  Location: [facility] ENDO (2ND FLR);  Service: Endoscopy;  Laterality: N/A;  2nd floor-gastroparesis  full liquid diet x3 days, clear liquid diet x1 day prior to procedure  fully vaccinated, instructions sent to [facility]  6/29-pt confirmed new arrival time-[facility]    EXCISION, LESION, STOMACH, LAPAROSCOPIC N/A 03/23/2023    Procedure: XI ROBOTIC EXCISION, LESION, STOMACH;  Surgeon: Katherine Segura MD;  [facility]  Service: General;  Laterality: N/A;    EXCISIONAL BIOPSY, LYMPH NODE Right 05/26/2023    Procedure: EXCISIONAL BIOPSY, LYMPH NODE, INGUINAL;  Surgeon: Katherine Segura MD;  Location: [facility] OR 2ND FLR;  Service: General;  Laterality: Right;    HYSTERECTOMY  1990    FRANDY (AUB, Fibroids), ovaries remain    REPAIR, HERNIA, UMBILICAL N/A 03/23/2023    Procedure: REPAIR, HERNIA, UMBILICAL;  Surgeon: Katherine Segura MD;  [facility];  Service: General;  Laterality: N/A;    RIGHT HEART CATHETERIZATION Right 01/05/2021    Procedure: INSERTION, CATHETER, RIGHT HEART;  Surgeon: Aureliano Sy MD;  Location:  [facility] CATH LAB;  Service: Cardiology;  Laterality: Right;    RIGHT HEART CATHETERIZATION Right 03/16/2023    Procedure: INSERTION, CATHETER, RIGHT HEART;  Surgeon: Aureliano Sy MD;  Location: [facility] CATH LAB;  Service: Cardiology;  Laterality: Right;    RIGHT HEART CATHETERIZATION Right 11/22/2024    Procedure: INSERTION, CATHETER, RIGHT HEART;  Surgeon: Tamanna Martins MD;  Location: [facility] CATH LAB;  Service: Cardiology;  Laterality: Right;    ROBOT-ASSISTED LAPAROSCOPIC REPAIR OF INGUINAL HERNIA USING DA VERNELL XI Right 05/26/2023    Procedure: XI ROBOTIC REPAIR, HERNIA, INGUINAL, FEMORAL;  Surgeon: Katherine Segura MD;  Location: [facility] OR [facility] FLR;  Service: General;  Laterality: Right;    ROBOT-ASSISTED REPAIR OF HIATAL HERNIA USING DA VERNELL XI N/A 03/23/2023    Procedure: XI ROBOTIC REPAIR, HERNIA, HIATAL;  Surgeon: Katherine Segura MD;  Location: [facility] OR 2ND FLR;  Service: General;  Laterality: N/A;    VARICOSE VEIN SURGERY      XI ROBOTIC GASTROPEXY N/A 03/23/2023    Procedure: XI ROBOTIC GASTROPEXY;  Surgeon: Katherine Segura MD;  Location: [facility] OR [facility] FLR;  Service: General;  Laterality: N/A;    XI ROBOTIC PYLOROMYOTOMY N/A 03/23/2023    Procedure: XI ROBOTIC PYLOROMYOTOMY;  Surgeon: Katherine Segura MD;  Location: [facility] OR 2ND FLR;  Service: General;  Laterality: N/A;        Objective:     Vitals  Wt Readings from Last 1 Encounters:   07/23/25 62.9 kg (138 lb 10.7 oz)     Temp Readings from Last 1 Encounters:   07/23/25 97.1 °F (36.2 °C) (Skin)     BP Readings from Last 1 Encounters:   07/25/25 136/64     Pulse Readings from Last 1 Encounters:   07/25/25 75       Dermatological Exam    Skin:  Skin of both feet thickened (scleroderma), stiff, with extensive ulcerations of both feet down to fat with slowly improving granulation; chronic low level infection, xerosis  Tender hypergranular tissue at dorsal 2nd toe PIPJ less prominent s/p debridement; wounds  epithelializing; continues to improve overall        Nails:  10 nail(s) thickened, and 10 nail(s) discolored    Vascular Exam    Arteries:  Posterior tibial artery palpable on right  Dorsalis pedis artery palpable on right  Posterior tibial artery palpable on left  Dorsalis pedis artery palpable on left    Veins:  Superficial veins unremarkable on right  Superficial veins unremarkable on left    Swellin+ nonpitting on right  1+ nonpitting on left    Neurological Exam    [place] touch test:  6/6 sites sensed, light touch intact     Musculoskeletal Exam    Footwear:  Casual on right  Casual on left    Gait Exam:   Ambulatory Status: Ambulatory  Gait: Normal  Assistive Devices: None    Foot Progression Angle:  Normal on right  Normal on left    Right Lower Extremity Additional Findings:  Right foot and ankle function, strength, and range of motion unremarkable except as noted above.     Left Lower Extremity Additional Findings:  2nd hammertoe  Left foot and ankle function, strength, and range of motion unremarkable except as noted above.    Imaging and Other Tests    Imagin/26/25 left foot X rays: 2nd hammertoe deformity, centered at PIPJ    Independently reviewed and interpreted imaging, findings are as follows: N/A     Assessment:     Encounter Diagnoses   Name Primary?    Ulcers of both lower extremities with fat layer exposed Yes    Cellulitis of left lower extremity     Cellulitis of lower extremity, unspecified laterality            Plan:     I counseled the patient on her conditions, their implications and medical management.    Neuropathy: chronic stable  -Failed amitriptyline.   -Continue duloxetine 120 mg/day.      Bilateral ulcers, cellulitis, swelling: chronic stable    -Dressings changed. HH dressing changes.    -Protected WB in surgical shoes.      -Continue topical compounded antibiotics from [facility].      Scleroderma: chronic stable  -Continue PO tadalafil 40 mg/day.       Return  to clinic in 4 weeks, call sooner PRN.

## 2025-07-29 ENCOUNTER — EXTERNAL HOME HEALTH (OUTPATIENT)
Dept: HOME HEALTH SERVICES | Facility: HOSPITAL | Age: 75
End: 2025-07-29
Payer: MEDICARE

## 2025-07-31 DIAGNOSIS — G89.4 CHRONIC PAIN DISORDER: ICD-10-CM

## 2025-07-31 DIAGNOSIS — J84.9 ILD (INTERSTITIAL LUNG DISEASE): Primary | ICD-10-CM

## 2025-07-31 LAB
Lab: NORMAL
Lab: NORMAL

## 2025-07-31 RX ORDER — ACETAMINOPHEN AND CODEINE PHOSPHATE 300; 60 MG/1; MG/1
1 TABLET ORAL NIGHTLY
Qty: 45 TABLET | Refills: 0 | Status: SHIPPED | OUTPATIENT
Start: 2025-07-31 | End: 2025-09-14

## 2025-08-01 ENCOUNTER — DOCUMENT SCAN (OUTPATIENT)
Dept: HOME HEALTH SERVICES | Facility: HOSPITAL | Age: 75
End: 2025-08-01
Payer: MEDICARE

## 2025-08-01 LAB
M IGG IHC, TECH ONLY: NORMAL
M TECH ONLY: NORMAL

## 2025-08-04 LAB
PATH REPORT.FINAL DX SPEC: NORMAL
PATHOLOGIST NAME: NORMAL
SPECIMEN SOURCE: NORMAL

## 2025-08-05 LAB
PATH REPORT.FINAL DX SPEC: NORMAL
PATHOLOGIST NAME: NORMAL
SPECIMEN SOURCE: NORMAL

## 2025-08-06 ENCOUNTER — TELEPHONE (OUTPATIENT)
Dept: RHEUMATOLOGY | Facility: CLINIC | Age: 75
End: 2025-08-06
Payer: MEDICARE

## 2025-08-06 DIAGNOSIS — D47.Z2: Primary | ICD-10-CM

## 2025-08-06 LAB
C1INH SERPL-MCNC: NORMAL MG/DL
DHEA SERPL-MCNC: NORMAL
ESTRIOL SERPL-MCNC: NORMAL NG/ML
ESTROGEN SERPL-MCNC: NORMAL PG/ML
INSULIN SERPL-ACNC: NORMAL U[IU]/ML
LAB AP CLINICAL INFORMATION: NORMAL
LAB AP GROSS DESCRIPTION: NORMAL
LAB AP PERFORMING LOCATION(S): NORMAL
LAB AP REPORT FOOTNOTES: NORMAL
T3RU NFR SERPL: NORMAL %

## 2025-08-06 NOTE — TELEPHONE ENCOUNTER
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 Thank you SARINA

## 2025-08-12 DIAGNOSIS — I27.20 CHRONIC PULMONARY HYPERTENSION: Primary | ICD-10-CM

## 2025-08-12 DIAGNOSIS — R06.82 TACHYPNEA: ICD-10-CM

## 2025-08-12 DIAGNOSIS — Z79.899 POLYPHARMACY: ICD-10-CM

## 2025-08-12 DIAGNOSIS — R06.02 SHORTNESS OF BREATH: ICD-10-CM

## 2025-08-14 DIAGNOSIS — M79.18 MYOFASCIAL PAIN: ICD-10-CM

## 2025-08-14 DIAGNOSIS — M34.89 CUTANEOUS SCLERODERMA: ICD-10-CM

## 2025-08-14 DIAGNOSIS — G60.9 IDIOPATHIC NEUROPATHY: ICD-10-CM

## 2025-08-15 RX ORDER — NABUMENTONE 750 MG/1
750 TABLET ORAL DAILY PRN
Qty: 30 TABLET | Refills: 3 | Status: SHIPPED | OUTPATIENT
Start: 2025-08-15

## 2025-08-20 ENCOUNTER — TELEPHONE (OUTPATIENT)
Dept: ENDOSCOPY | Facility: HOSPITAL | Age: 75
End: 2025-08-20
Payer: MEDICARE

## 2025-08-20 ENCOUNTER — OFFICE VISIT (OUTPATIENT)
Dept: GASTROENTEROLOGY | Facility: CLINIC | Age: 75
End: 2025-08-20
Payer: MEDICARE

## 2025-08-20 ENCOUNTER — OFFICE VISIT (OUTPATIENT)
Dept: SURGERY | Facility: CLINIC | Age: 75
End: 2025-08-20
Payer: MEDICARE

## 2025-08-20 ENCOUNTER — TELEPHONE (OUTPATIENT)
Dept: RHEUMATOLOGY | Facility: CLINIC | Age: 75
End: 2025-08-20
Payer: MEDICARE

## 2025-08-20 VITALS
DIASTOLIC BLOOD PRESSURE: 66 MMHG | SYSTOLIC BLOOD PRESSURE: 150 MMHG | HEART RATE: 81 BPM | BODY MASS INDEX: 24.96 KG/M2 | HEIGHT: 65 IN | WEIGHT: 149.81 LBS

## 2025-08-20 VITALS
BODY MASS INDEX: 24.75 KG/M2 | DIASTOLIC BLOOD PRESSURE: 64 MMHG | HEART RATE: 77 BPM | WEIGHT: 148.56 LBS | HEIGHT: 65 IN | SYSTOLIC BLOOD PRESSURE: 150 MMHG

## 2025-08-20 VITALS
WEIGHT: 148.56 LBS | BODY MASS INDEX: 24.75 KG/M2 | WEIGHT: 148.56 LBS | HEIGHT: 65 IN | BODY MASS INDEX: 24.75 KG/M2 | HEIGHT: 65 IN

## 2025-08-20 DIAGNOSIS — K22.70 BARRETT'S ESOPHAGUS WITHOUT DYSPLASIA: ICD-10-CM

## 2025-08-20 DIAGNOSIS — Z86.0100 HISTORY OF COLON POLYPS: Primary | ICD-10-CM

## 2025-08-20 DIAGNOSIS — K21.9 GASTROESOPHAGEAL REFLUX DISEASE, UNSPECIFIED WHETHER ESOPHAGITIS PRESENT: ICD-10-CM

## 2025-08-20 DIAGNOSIS — R13.19 ESOPHAGEAL DYSPHAGIA: ICD-10-CM

## 2025-08-20 DIAGNOSIS — K31.84 GASTROPARESIS: ICD-10-CM

## 2025-08-20 DIAGNOSIS — K22.70 BARRETT'S ESOPHAGUS WITHOUT DYSPLASIA: Primary | ICD-10-CM

## 2025-08-20 DIAGNOSIS — R13.10 DYSPHAGIA, UNSPECIFIED TYPE: ICD-10-CM

## 2025-08-20 DIAGNOSIS — R59.0 LYMPHADENOPATHY, INGUINAL: Primary | ICD-10-CM

## 2025-08-20 PROCEDURE — 99999 PR PBB SHADOW E&M-EST. PATIENT-LVL III: CPT | Mod: PBBFAC,TXP,,

## 2025-08-20 PROCEDURE — 99214 OFFICE O/P EST MOD 30 MIN: CPT | Mod: PBBFAC,27,TXP | Performed by: SURGERY

## 2025-08-20 PROCEDURE — 99213 OFFICE O/P EST LOW 20 MIN: CPT | Mod: PBBFAC,TXP

## 2025-08-20 PROCEDURE — 99999 PR PBB SHADOW E&M-EST. PATIENT-LVL IV: CPT | Mod: PBBFAC,TXP,, | Performed by: SURGERY

## 2025-08-25 ENCOUNTER — APPOINTMENT (OUTPATIENT)
Dept: LAB | Facility: HOSPITAL | Age: 75
End: 2025-08-25
Attending: INTERNAL MEDICINE
Payer: MEDICARE

## 2025-08-25 ENCOUNTER — OFFICE VISIT (OUTPATIENT)
Dept: RHEUMATOLOGY | Facility: CLINIC | Age: 75
End: 2025-08-25
Payer: MEDICARE

## 2025-08-25 VITALS
SYSTOLIC BLOOD PRESSURE: 117 MMHG | OXYGEN SATURATION: 96 % | HEART RATE: 78 BPM | DIASTOLIC BLOOD PRESSURE: 62 MMHG | HEIGHT: 65 IN | BODY MASS INDEX: 24.65 KG/M2 | WEIGHT: 147.94 LBS

## 2025-08-25 DIAGNOSIS — M85.89 OSTEOPENIA OF MULTIPLE SITES: ICD-10-CM

## 2025-08-25 DIAGNOSIS — D50.9 IRON DEFICIENCY ANEMIA, UNSPECIFIED IRON DEFICIENCY ANEMIA TYPE: ICD-10-CM

## 2025-08-25 DIAGNOSIS — J84.9 ILD (INTERSTITIAL LUNG DISEASE): ICD-10-CM

## 2025-08-25 DIAGNOSIS — I87.2 VENOUS INSUFFICIENCY OF BOTH LOWER EXTREMITIES: ICD-10-CM

## 2025-08-25 DIAGNOSIS — Z00.00 ENCOUNTER FOR MEDICARE ANNUAL WELLNESS EXAM: ICD-10-CM

## 2025-08-25 DIAGNOSIS — Z79.899 IMMUNOSUPPRESSION DUE TO DRUG THERAPY: ICD-10-CM

## 2025-08-25 DIAGNOSIS — M34.9 SCLERODERMA WITH PULMONARY INVOLVEMENT: Primary | ICD-10-CM

## 2025-08-25 DIAGNOSIS — D84.821 IMMUNOSUPPRESSION DUE TO DRUG THERAPY: ICD-10-CM

## 2025-08-25 DIAGNOSIS — I27.29 PULMONARY HYPERTENSION ASSOCIATED WITH SYSTEMIC DISORDER: ICD-10-CM

## 2025-08-25 PROCEDURE — 99215 OFFICE O/P EST HI 40 MIN: CPT | Mod: S$PBB,GC,NTX, | Performed by: INTERNAL MEDICINE

## 2025-08-25 PROCEDURE — 99215 OFFICE O/P EST HI 40 MIN: CPT | Mod: PBBFAC,TXP

## 2025-08-25 PROCEDURE — 99999 PR PBB SHADOW E&M-EST. PATIENT-LVL V: CPT | Mod: PBBFAC,GC,TXP,

## 2025-08-26 ENCOUNTER — OFFICE VISIT (OUTPATIENT)
Dept: PAIN MEDICINE | Facility: CLINIC | Age: 75
End: 2025-08-26
Payer: MEDICARE

## 2025-08-26 VITALS
TEMPERATURE: 99 F | OXYGEN SATURATION: 97 % | SYSTOLIC BLOOD PRESSURE: 128 MMHG | HEART RATE: 86 BPM | DIASTOLIC BLOOD PRESSURE: 57 MMHG | WEIGHT: 147.94 LBS | HEIGHT: 65 IN | BODY MASS INDEX: 24.65 KG/M2

## 2025-08-26 DIAGNOSIS — G60.9 IDIOPATHIC NEUROPATHY: ICD-10-CM

## 2025-08-26 DIAGNOSIS — Z79.891 ENCOUNTER FOR MONITORING OPIOID MAINTENANCE THERAPY: ICD-10-CM

## 2025-08-26 DIAGNOSIS — M34.89 CUTANEOUS SCLERODERMA: ICD-10-CM

## 2025-08-26 DIAGNOSIS — G89.4 CHRONIC PAIN DISORDER: Primary | ICD-10-CM

## 2025-08-26 DIAGNOSIS — G89.4 CHRONIC PAIN DISORDER: ICD-10-CM

## 2025-08-26 DIAGNOSIS — Z51.81 ENCOUNTER FOR MONITORING OPIOID MAINTENANCE THERAPY: ICD-10-CM

## 2025-08-26 DIAGNOSIS — M79.18 MYOFASCIAL PAIN: ICD-10-CM

## 2025-08-26 PROCEDURE — 99214 OFFICE O/P EST MOD 30 MIN: CPT | Mod: S$PBB,,, | Performed by: NURSE PRACTITIONER

## 2025-08-26 PROCEDURE — 99215 OFFICE O/P EST HI 40 MIN: CPT | Mod: PBBFAC | Performed by: NURSE PRACTITIONER

## 2025-08-26 PROCEDURE — 99999 PR PBB SHADOW E&M-EST. PATIENT-LVL V: CPT | Mod: PBBFAC,,, | Performed by: NURSE PRACTITIONER

## 2025-08-26 RX ORDER — NABUMENTONE 750 MG/1
750 TABLET ORAL DAILY PRN
Qty: 30 TABLET | Refills: 3 | Status: SHIPPED | OUTPATIENT
Start: 2025-08-26

## 2025-08-27 ENCOUNTER — PATIENT MESSAGE (OUTPATIENT)
Dept: ADMINISTRATIVE | Facility: OTHER | Age: 75
End: 2025-08-27
Payer: MEDICARE

## 2025-08-27 ENCOUNTER — OFFICE VISIT (OUTPATIENT)
Dept: PODIATRY | Facility: CLINIC | Age: 75
End: 2025-08-27
Payer: MEDICARE

## 2025-08-27 ENCOUNTER — TELEPHONE (OUTPATIENT)
Dept: TRANSPLANT | Facility: CLINIC | Age: 75
End: 2025-08-27
Payer: MEDICARE

## 2025-08-27 VITALS
SYSTOLIC BLOOD PRESSURE: 133 MMHG | DIASTOLIC BLOOD PRESSURE: 61 MMHG | BODY MASS INDEX: 24.94 KG/M2 | WEIGHT: 149.69 LBS | HEIGHT: 65 IN | HEART RATE: 85 BPM

## 2025-08-27 DIAGNOSIS — L97.912 ULCERS OF BOTH LOWER EXTREMITIES WITH FAT LAYER EXPOSED: Primary | ICD-10-CM

## 2025-08-27 DIAGNOSIS — L03.116 CELLULITIS OF LEFT LOWER EXTREMITY: ICD-10-CM

## 2025-08-27 DIAGNOSIS — L97.922 ULCERS OF BOTH LOWER EXTREMITIES WITH FAT LAYER EXPOSED: Primary | ICD-10-CM

## 2025-08-27 PROCEDURE — 99214 OFFICE O/P EST MOD 30 MIN: CPT | Mod: PBBFAC | Performed by: STUDENT IN AN ORGANIZED HEALTH CARE EDUCATION/TRAINING PROGRAM

## 2025-08-27 PROCEDURE — 99999 PR PBB SHADOW E&M-EST. PATIENT-LVL IV: CPT | Mod: PBBFAC,,, | Performed by: STUDENT IN AN ORGANIZED HEALTH CARE EDUCATION/TRAINING PROGRAM

## 2025-08-27 PROCEDURE — 99214 OFFICE O/P EST MOD 30 MIN: CPT | Mod: S$PBB,,, | Performed by: STUDENT IN AN ORGANIZED HEALTH CARE EDUCATION/TRAINING PROGRAM

## 2025-08-28 ENCOUNTER — TELEPHONE (OUTPATIENT)
Dept: TRANSPLANT | Facility: CLINIC | Age: 75
End: 2025-08-28
Payer: MEDICARE

## 2025-08-29 ENCOUNTER — OFFICE VISIT (OUTPATIENT)
Dept: TRANSPLANT | Facility: CLINIC | Age: 75
End: 2025-08-29
Payer: MEDICARE

## 2025-08-29 ENCOUNTER — HOSPITAL ENCOUNTER (OUTPATIENT)
Dept: PULMONOLOGY | Facility: CLINIC | Age: 75
Discharge: HOME | End: 2025-08-29
Payer: MEDICARE

## 2025-08-29 VITALS
HEART RATE: 77 BPM | WEIGHT: 149 LBS | RESPIRATION RATE: 16 BRPM | TEMPERATURE: 98 F | BODY MASS INDEX: 24.83 KG/M2 | DIASTOLIC BLOOD PRESSURE: 63 MMHG | HEIGHT: 65 IN | SYSTOLIC BLOOD PRESSURE: 140 MMHG | OXYGEN SATURATION: 99 %

## 2025-08-29 DIAGNOSIS — M34.9 SCLERODERMA: ICD-10-CM

## 2025-08-29 DIAGNOSIS — I27.29 PULMONARY HYPERTENSION ASSOCIATED WITH SYSTEMIC DISORDER: ICD-10-CM

## 2025-08-29 DIAGNOSIS — J84.9 ILD (INTERSTITIAL LUNG DISEASE): Primary | ICD-10-CM

## 2025-08-29 PROCEDURE — 99215 OFFICE O/P EST HI 40 MIN: CPT | Mod: PBBFAC,TXP | Performed by: PHYSICIAN ASSISTANT

## 2025-08-29 PROCEDURE — 99999 PR PBB SHADOW E&M-EST. PATIENT-LVL V: CPT | Mod: PBBFAC,TXP,, | Performed by: PHYSICIAN ASSISTANT

## 2025-09-02 ENCOUNTER — EXTERNAL HOME HEALTH (OUTPATIENT)
Dept: HOME HEALTH SERVICES | Facility: HOSPITAL | Age: 75
End: 2025-09-02
Payer: MEDICARE

## 2025-09-02 ENCOUNTER — LAB VISIT (OUTPATIENT)
Dept: LAB | Facility: HOSPITAL | Age: 75
End: 2025-09-02
Attending: INTERNAL MEDICINE
Payer: MEDICARE

## 2025-09-02 ENCOUNTER — OFFICE VISIT (OUTPATIENT)
Dept: HEMATOLOGY/ONCOLOGY | Facility: CLINIC | Age: 75
End: 2025-09-02
Payer: MEDICARE

## 2025-09-02 VITALS
HEIGHT: 66 IN | WEIGHT: 146.69 LBS | RESPIRATION RATE: 18 BRPM | SYSTOLIC BLOOD PRESSURE: 136 MMHG | OXYGEN SATURATION: 95 % | BODY MASS INDEX: 23.58 KG/M2 | HEART RATE: 82 BPM | DIASTOLIC BLOOD PRESSURE: 60 MMHG

## 2025-09-02 DIAGNOSIS — D47.Z2 CASTLEMAN TUMOR: ICD-10-CM

## 2025-09-02 DIAGNOSIS — Z87.898 PERSONAL HISTORY OF OTHER SPECIFIED CONDITIONS: ICD-10-CM

## 2025-09-02 DIAGNOSIS — D47.Z2 CASTLEMAN TUMOR: Primary | ICD-10-CM

## 2025-09-02 LAB
BETA 2 MICROGLOBILIN (OHS): 2.7 UG/ML (ref 0–2.5)
HBV SURFACE AB SER-ACNC: <3 MIU/ML
HBV SURFACE AB SERPL IA-ACNC: NORMAL M[IU]/ML
HBV SURFACE AG SERPL QL IA: NORMAL
HCV AB SERPL QL IA: NORMAL
HIV 1+2 AB+HIV1 P24 AG SERPL QL IA: NORMAL
IGA SERPL-MCNC: 552 MG/DL (ref 40–350)
IGG SERPL-MCNC: 2610 MG/DL (ref 650–1600)
IGM SERPL-MCNC: 106 MG/DL (ref 50–300)
LDH SERPL-CCNC: 160 U/L (ref 110–260)

## 2025-09-02 PROCEDURE — 87389 HIV-1 AG W/HIV-1&-2 AB AG IA: CPT | Mod: TXP

## 2025-09-02 PROCEDURE — 82232 ASSAY OF BETA-2 PROTEIN: CPT | Mod: TXP

## 2025-09-02 PROCEDURE — 99215 OFFICE O/P EST HI 40 MIN: CPT | Mod: PBBFAC | Performed by: INTERNAL MEDICINE

## 2025-09-02 PROCEDURE — 87340 HEPATITIS B SURFACE AG IA: CPT | Mod: TXP

## 2025-09-02 PROCEDURE — 99204 OFFICE O/P NEW MOD 45 MIN: CPT | Mod: S$PBB,,, | Performed by: INTERNAL MEDICINE

## 2025-09-02 PROCEDURE — 99999 PR PBB SHADOW E&M-EST. PATIENT-LVL V: CPT | Mod: PBBFAC,,, | Performed by: INTERNAL MEDICINE

## 2025-09-02 PROCEDURE — 86706 HEP B SURFACE ANTIBODY: CPT | Mod: TXP

## 2025-09-02 PROCEDURE — 87798 DETECT AGENT NOS DNA AMP: CPT | Mod: TXP

## 2025-09-02 PROCEDURE — 83521 IG LIGHT CHAINS FREE EACH: CPT | Mod: TXP

## 2025-09-02 PROCEDURE — 82784 ASSAY IGA/IGD/IGG/IGM EACH: CPT | Mod: 59,TXP

## 2025-09-02 PROCEDURE — 86803 HEPATITIS C AB TEST: CPT | Mod: TXP

## 2025-09-02 PROCEDURE — 83615 LACTATE (LD) (LDH) ENZYME: CPT | Mod: TXP

## 2025-09-02 PROCEDURE — 36415 COLL VENOUS BLD VENIPUNCTURE: CPT | Mod: TXP

## 2025-09-03 LAB
KAPPA LC FREE SER-MCNC: 2.18 MG/L (ref 0.26–1.65)
KAPPA LC FREE/LAMBDA FREE SER: 8.05 MG/DL (ref 0.33–1.94)
LAMBDA LC FREE SERPL-MCNC: 3.7 MG/DL (ref 0.57–2.63)

## 2025-09-04 LAB
EBV DNA SPEC QL NAA+PROBE: NOT DETECTED
SPECIMEN SOURCE: NORMAL

## (undated) DEVICE — KIT ANTIFOG W/SPONG & FLUID

## (undated) DEVICE — SYR SLIP TIP 60 CC DISP

## (undated) DEVICE — COVER TIP CURVED SCISSORS XI

## (undated) DEVICE — KIT PROBE COVER WITH GEL

## (undated) DEVICE — OBTURATOR BLADELESS 8MM XI CLR

## (undated) DEVICE — KIT INTRO MICPUNC ECHOGNC TIP

## (undated) DEVICE — DRAPE STERI INSTRUMENT 1018

## (undated) DEVICE — TRAY MINOR GEN SURG OMC

## (undated) DEVICE — IRRIGATOR ENDOSCOPY DISP.

## (undated) DEVICE — KIT MICROINTRODUCE MINI 5X10CM

## (undated) DEVICE — DRAPE THREE-QTR REINF 53X77IN

## (undated) DEVICE — SHEATH INTRODUCER 7FR 11CM

## (undated) DEVICE — DRAPE SCOPE PILLOW WARMER

## (undated) DEVICE — SUT VICRYL 3-0 27 SH

## (undated) DEVICE — MARKER SKIN STND TIP BLUE BARR

## (undated) DEVICE — CONTAINER SPECIMEN STRL 4OZ

## (undated) DEVICE — SUT 0 VICRYL / UR6 (J603)

## (undated) DEVICE — COVER LIGHT HANDLE

## (undated) DEVICE — SYS LABEL CORRECT MED

## (undated) DEVICE — DRESSING SURGICAL 1X3

## (undated) DEVICE — SEE MEDLINE ITEM 156894

## (undated) DEVICE — BLADE SURG #15 CARBON STEEL

## (undated) DEVICE — LUBRICANT SURGILUBE 2 OZ

## (undated) DEVICE — Device

## (undated) DEVICE — ADAPTER SWIVEL

## (undated) DEVICE — DRAPE ARM DAVINCI XI

## (undated) DEVICE — CATH SWAN GANZ STND 7FR

## (undated) DEVICE — SEAL UNIVERSAL 5MM-8MM XI

## (undated) DEVICE — HEMOSTAT SURGICEL SNOW 2X4IN

## (undated) DEVICE — ELECTRODE REM PLYHSV RETURN 9

## (undated) DEVICE — BOWL STERILE LARGE 32OZ

## (undated) DEVICE — PACK ECLIPSE SET-UP W/O DRAPE

## (undated) DEVICE — GOWN POLY REINF BRTH SLV XL

## (undated) DEVICE — SUT MCRYL PLUS 4-0 PS2 27IN

## (undated) DEVICE — NDL HYPO REG 25G X 1 1/2

## (undated) DEVICE — KIT ENDOKIT COMPLIANCE CUSTOM

## (undated) DEVICE — SYR SLIP TIP 20CC

## (undated) DEVICE — SOL ELECTROLUBE ANTI-STIC

## (undated) DEVICE — TOWEL OR DISP STRL BLUE 4/PK

## (undated) DEVICE — ADHESIVE DERMABOND ADVANCED

## (undated) DEVICE — CONTAINER SPECIMEN OR STER 4OZ

## (undated) DEVICE — BLADE SURG CARBON STEEL SZ11

## (undated) DEVICE — DRAPE COLUMN DAVINCI XI

## (undated) DEVICE — TRAY CATH LAB OMC

## (undated) DEVICE — DRAPE ABDOMINAL TIBURON 14X11

## (undated) DEVICE — TUBING SUC UNIV W/CONN 12FT

## (undated) DEVICE — SPONGE COTTON TRAY 4X4IN

## (undated) DEVICE — TRANSDUCER ADULT DISP

## (undated) DEVICE — SUT 3-0 12-18IN SILK

## (undated) DEVICE — SYR 10CC LUER LOCK

## (undated) DEVICE — CATH BRONCHOSCOPE F/BF

## (undated) DEVICE — APPLICATOR CHLORAPREP ORN 26ML

## (undated) DEVICE — SUT VLOC 90 3-0 V-20 NDL 6

## (undated) DEVICE — INTRODUCER 7FR PERCUTANEOUS

## (undated) DEVICE — KIT VUETIP TROCAR SWAB

## (undated) DEVICE — SET CO-SET CLOSED INJ

## (undated) DEVICE — DRAPE LAP T SHT W/ INSTR PAD

## (undated) DEVICE — SUT CTD VICRYL 3-0 UND BR

## (undated) DEVICE — PENCIL SMK EVAC CONNECTOR 10FT

## (undated) DEVICE — SUT BLU GS-22 0 3.5M 23CM 9IN

## (undated) DEVICE — TROCAR ENDOPATH XCEL 5X100MM

## (undated) DEVICE — COVER PROBE US 5.5X58L NON LTX